# Patient Record
Sex: MALE | Race: BLACK OR AFRICAN AMERICAN | NOT HISPANIC OR LATINO | Employment: OTHER | URBAN - METROPOLITAN AREA
[De-identification: names, ages, dates, MRNs, and addresses within clinical notes are randomized per-mention and may not be internally consistent; named-entity substitution may affect disease eponyms.]

---

## 2018-03-31 ENCOUNTER — HISTORICAL (OUTPATIENT)
Dept: ADMINISTRATIVE | Facility: HOSPITAL | Age: 53
End: 2018-03-31

## 2018-03-31 LAB — GRAM STN SPEC: NORMAL

## 2018-04-02 LAB — FINAL CULTURE: NO GROWTH

## 2018-04-06 LAB — FINAL CULTURE: NORMAL

## 2018-04-11 LAB
FINAL CULTURE: NORMAL
FINAL CULTURE: NORMAL

## 2018-09-17 ENCOUNTER — HISTORICAL (OUTPATIENT)
Dept: ADMINISTRATIVE | Facility: HOSPITAL | Age: 53
End: 2018-09-17

## 2018-09-25 ENCOUNTER — HISTORICAL (OUTPATIENT)
Dept: ADMINISTRATIVE | Facility: HOSPITAL | Age: 53
End: 2018-09-25

## 2018-10-04 ENCOUNTER — HISTORICAL (OUTPATIENT)
Dept: ADMINISTRATIVE | Facility: HOSPITAL | Age: 53
End: 2018-10-04

## 2018-10-16 ENCOUNTER — HISTORICAL (OUTPATIENT)
Dept: ADMINISTRATIVE | Facility: HOSPITAL | Age: 53
End: 2018-10-16

## 2018-10-22 ENCOUNTER — HISTORICAL (OUTPATIENT)
Dept: ADMINISTRATIVE | Facility: HOSPITAL | Age: 53
End: 2018-10-22

## 2018-11-05 ENCOUNTER — HISTORICAL (OUTPATIENT)
Dept: ADMINISTRATIVE | Facility: HOSPITAL | Age: 53
End: 2018-11-05

## 2018-11-12 ENCOUNTER — HISTORICAL (OUTPATIENT)
Dept: ADMINISTRATIVE | Facility: HOSPITAL | Age: 53
End: 2018-11-12

## 2018-11-19 ENCOUNTER — HISTORICAL (OUTPATIENT)
Dept: ADMINISTRATIVE | Facility: HOSPITAL | Age: 53
End: 2018-11-19

## 2018-11-26 ENCOUNTER — HISTORICAL (OUTPATIENT)
Dept: ADMINISTRATIVE | Facility: HOSPITAL | Age: 53
End: 2018-11-26

## 2018-12-03 ENCOUNTER — HISTORICAL (OUTPATIENT)
Dept: ADMINISTRATIVE | Facility: HOSPITAL | Age: 53
End: 2018-12-03

## 2018-12-04 ENCOUNTER — HISTORICAL (OUTPATIENT)
Dept: RADIOLOGY | Facility: HOSPITAL | Age: 53
End: 2018-12-04

## 2018-12-10 ENCOUNTER — HISTORICAL (OUTPATIENT)
Dept: ADMINISTRATIVE | Facility: HOSPITAL | Age: 53
End: 2018-12-10

## 2018-12-17 ENCOUNTER — HISTORICAL (OUTPATIENT)
Dept: ADMINISTRATIVE | Facility: HOSPITAL | Age: 53
End: 2018-12-17

## 2018-12-27 ENCOUNTER — HISTORICAL (OUTPATIENT)
Dept: ADMINISTRATIVE | Facility: HOSPITAL | Age: 53
End: 2018-12-27

## 2019-01-07 ENCOUNTER — HISTORICAL (OUTPATIENT)
Dept: LAB | Facility: HOSPITAL | Age: 54
End: 2019-01-07

## 2019-01-07 ENCOUNTER — HISTORICAL (OUTPATIENT)
Dept: ADMINISTRATIVE | Facility: HOSPITAL | Age: 54
End: 2019-01-07

## 2019-01-11 LAB — FINAL CULTURE: NORMAL

## 2019-01-14 ENCOUNTER — HISTORICAL (OUTPATIENT)
Dept: ADMINISTRATIVE | Facility: HOSPITAL | Age: 54
End: 2019-01-14

## 2019-02-11 ENCOUNTER — HISTORICAL (OUTPATIENT)
Dept: ADMINISTRATIVE | Facility: HOSPITAL | Age: 54
End: 2019-02-11

## 2019-02-18 ENCOUNTER — HISTORICAL (OUTPATIENT)
Dept: ADMINISTRATIVE | Facility: HOSPITAL | Age: 54
End: 2019-02-18

## 2019-02-25 ENCOUNTER — HISTORICAL (OUTPATIENT)
Dept: ADMINISTRATIVE | Facility: HOSPITAL | Age: 54
End: 2019-02-25

## 2019-03-11 ENCOUNTER — HISTORICAL (OUTPATIENT)
Dept: ADMINISTRATIVE | Facility: HOSPITAL | Age: 54
End: 2019-03-11

## 2019-03-18 ENCOUNTER — HISTORICAL (OUTPATIENT)
Dept: ADMINISTRATIVE | Facility: HOSPITAL | Age: 54
End: 2019-03-18

## 2019-03-25 ENCOUNTER — HISTORICAL (OUTPATIENT)
Dept: ADMINISTRATIVE | Facility: HOSPITAL | Age: 54
End: 2019-03-25

## 2019-04-08 ENCOUNTER — HISTORICAL (OUTPATIENT)
Dept: ADMINISTRATIVE | Facility: HOSPITAL | Age: 54
End: 2019-04-08

## 2019-05-19 ENCOUNTER — HISTORICAL (OUTPATIENT)
Dept: ADMINISTRATIVE | Facility: HOSPITAL | Age: 54
End: 2019-05-19

## 2019-08-01 ENCOUNTER — HISTORICAL (OUTPATIENT)
Dept: ADMINISTRATIVE | Facility: HOSPITAL | Age: 54
End: 2019-08-01

## 2019-08-12 ENCOUNTER — HISTORICAL (OUTPATIENT)
Dept: ADMINISTRATIVE | Facility: HOSPITAL | Age: 54
End: 2019-08-12

## 2019-08-19 ENCOUNTER — HISTORICAL (OUTPATIENT)
Dept: ADMINISTRATIVE | Facility: HOSPITAL | Age: 54
End: 2019-08-19

## 2019-08-26 ENCOUNTER — HISTORICAL (OUTPATIENT)
Dept: ADMINISTRATIVE | Facility: HOSPITAL | Age: 54
End: 2019-08-26

## 2019-09-09 ENCOUNTER — HISTORICAL (OUTPATIENT)
Dept: ADMINISTRATIVE | Facility: HOSPITAL | Age: 54
End: 2019-09-09

## 2019-09-16 ENCOUNTER — HISTORICAL (OUTPATIENT)
Dept: ADMINISTRATIVE | Facility: HOSPITAL | Age: 54
End: 2019-09-16

## 2019-09-23 ENCOUNTER — HISTORICAL (OUTPATIENT)
Dept: ADMINISTRATIVE | Facility: HOSPITAL | Age: 54
End: 2019-09-23

## 2019-09-30 ENCOUNTER — HISTORICAL (OUTPATIENT)
Dept: ADMINISTRATIVE | Facility: HOSPITAL | Age: 54
End: 2019-09-30

## 2019-10-07 ENCOUNTER — HISTORICAL (OUTPATIENT)
Dept: ADMINISTRATIVE | Facility: HOSPITAL | Age: 54
End: 2019-10-07

## 2019-10-16 ENCOUNTER — EXTERNAL HOSPITAL ADMISSION (OUTPATIENT)
Dept: ADMINISTRATIVE | Facility: CLINIC | Age: 54
End: 2019-10-16

## 2019-10-16 ENCOUNTER — TELEPHONE (OUTPATIENT)
Dept: ADMINISTRATIVE | Facility: CLINIC | Age: 54
End: 2019-10-16

## 2019-10-16 NOTE — PROGRESS NOTES
C3 nurse attempted to contact patient. No answer. The following message was left for the patient to return the call:  Good morning I am a nurse calling on behalf of [facility] [email or web address] from the [facility].  This is a Transitional Care Call for Bianca Khan. When you have a moment please contact us at [phone] or [phone] Monday through Friday, between the hours of 8 am to 4 pm. We look forward to speaking with you. On behalf of [employer] [facility] have a nice day.    The patient does not have a scheduled HOSFU appointment within 7-14 days post hospital discharge date 10/15/19. Patient has no PCP assigned at this time, unable to route.

## 2019-10-28 ENCOUNTER — HISTORICAL (OUTPATIENT)
Dept: ADMINISTRATIVE | Facility: HOSPITAL | Age: 54
End: 2019-10-28

## 2019-10-30 ENCOUNTER — HISTORICAL (OUTPATIENT)
Dept: ADMINISTRATIVE | Facility: HOSPITAL | Age: 54
End: 2019-10-30

## 2019-11-11 ENCOUNTER — HISTORICAL (OUTPATIENT)
Dept: ADMINISTRATIVE | Facility: HOSPITAL | Age: 54
End: 2019-11-11

## 2019-11-18 ENCOUNTER — HISTORICAL (OUTPATIENT)
Dept: ADMINISTRATIVE | Facility: HOSPITAL | Age: 54
End: 2019-11-18

## 2019-11-18 LAB
ABS NEUT (OLG): 3.98 X10(3)/MCL (ref 2.1–9.2)
BASOPHILS # BLD AUTO: 0 X10(3)/MCL (ref 0–0.2)
BASOPHILS NFR BLD AUTO: 0 %
BUN SERPL-MCNC: 12 MG/DL (ref 7–18)
CALCIUM SERPL-MCNC: 9.6 MG/DL (ref 8.5–10.1)
CHLORIDE SERPL-SCNC: 102 MMOL/L (ref 98–107)
CO2 SERPL-SCNC: 32 MMOL/L (ref 21–32)
CREAT SERPL-MCNC: 0.83 MG/DL (ref 0.7–1.3)
CREAT/UREA NIT SERPL: 14.5
CRP SERPL HS-MCNC: 14.7 MG/L (ref 0–3)
EOSINOPHIL # BLD AUTO: 0.1 X10(3)/MCL (ref 0–0.9)
EOSINOPHIL NFR BLD AUTO: 1 %
ERYTHROCYTE [DISTWIDTH] IN BLOOD BY AUTOMATED COUNT: 15.5 % (ref 11.5–17)
ERYTHROCYTE [SEDIMENTATION RATE] IN BLOOD: 33 MM/HR (ref 0–15)
EST. AVERAGE GLUCOSE BLD GHB EST-MCNC: 134 MG/DL
GLUCOSE SERPL-MCNC: 90 MG/DL (ref 74–106)
HBA1C MFR BLD: 6.3 % (ref 4.2–6.3)
HCT VFR BLD AUTO: 42.3 % (ref 42–52)
HGB BLD-MCNC: 12.7 GM/DL (ref 14–18)
LYMPHOCYTES # BLD AUTO: 3.8 X10(3)/MCL (ref 0.6–4.6)
LYMPHOCYTES NFR BLD AUTO: 44 %
MCH RBC QN AUTO: 26.7 PG (ref 27–31)
MCHC RBC AUTO-ENTMCNC: 30 GM/DL (ref 33–36)
MCV RBC AUTO: 89.1 FL (ref 80–94)
MONOCYTES # BLD AUTO: 0.6 X10(3)/MCL (ref 0.1–1.3)
MONOCYTES NFR BLD AUTO: 7 %
NEUTROPHILS # BLD AUTO: 3.98 X10(3)/MCL (ref 2.1–9.2)
NEUTROPHILS NFR BLD AUTO: 46 %
PLATELET # BLD AUTO: 364 X10(3)/MCL (ref 130–400)
PMV BLD AUTO: 8.7 FL (ref 9.4–12.4)
POTASSIUM SERPL-SCNC: 4.5 MMOL/L (ref 3.5–5.1)
PREALB SERPL-MCNC: 27.9 MG/DL (ref 18–38)
RBC # BLD AUTO: 4.75 X10(6)/MCL (ref 4.7–6.1)
SODIUM SERPL-SCNC: 138 MMOL/L (ref 136–145)
WBC # SPEC AUTO: 8.6 X10(3)/MCL (ref 4.5–11.5)

## 2019-11-25 ENCOUNTER — HISTORICAL (OUTPATIENT)
Dept: ADMINISTRATIVE | Facility: HOSPITAL | Age: 54
End: 2019-11-25

## 2019-12-02 ENCOUNTER — HISTORICAL (OUTPATIENT)
Dept: ADMINISTRATIVE | Facility: HOSPITAL | Age: 54
End: 2019-12-02

## 2019-12-02 ENCOUNTER — HISTORICAL (OUTPATIENT)
Dept: ANESTHESIOLOGY | Facility: HOSPITAL | Age: 54
End: 2019-12-02

## 2019-12-09 ENCOUNTER — HISTORICAL (OUTPATIENT)
Dept: ADMINISTRATIVE | Facility: HOSPITAL | Age: 54
End: 2019-12-09

## 2019-12-23 ENCOUNTER — HISTORICAL (OUTPATIENT)
Dept: ADMINISTRATIVE | Facility: HOSPITAL | Age: 54
End: 2019-12-23

## 2019-12-30 ENCOUNTER — HISTORICAL (OUTPATIENT)
Dept: ADMINISTRATIVE | Facility: HOSPITAL | Age: 54
End: 2019-12-30

## 2020-01-06 ENCOUNTER — HISTORICAL (OUTPATIENT)
Dept: ADMINISTRATIVE | Facility: HOSPITAL | Age: 55
End: 2020-01-06

## 2020-01-13 ENCOUNTER — HISTORICAL (OUTPATIENT)
Dept: ADMINISTRATIVE | Facility: HOSPITAL | Age: 55
End: 2020-01-13

## 2020-01-20 ENCOUNTER — HISTORICAL (OUTPATIENT)
Dept: ADMINISTRATIVE | Facility: HOSPITAL | Age: 55
End: 2020-01-20

## 2020-01-27 ENCOUNTER — HISTORICAL (OUTPATIENT)
Dept: ADMINISTRATIVE | Facility: HOSPITAL | Age: 55
End: 2020-01-27

## 2020-02-10 ENCOUNTER — HISTORICAL (OUTPATIENT)
Dept: ADMINISTRATIVE | Facility: HOSPITAL | Age: 55
End: 2020-02-10

## 2020-02-12 ENCOUNTER — HISTORICAL (OUTPATIENT)
Dept: CARDIOLOGY | Facility: HOSPITAL | Age: 55
End: 2020-02-12

## 2020-02-12 LAB
ABS NEUT (OLG): 2.02 X10(3)/MCL (ref 2.1–9.2)
APPEARANCE, UA: ABNORMAL
BACTERIA SPEC CULT: ABNORMAL /HPF
BASOPHILS NFR BLD MANUAL: 2 % (ref 0–2)
BILIRUB UR QL STRIP: NEGATIVE
BUN SERPL-MCNC: 21 MG/DL (ref 7–18)
CALCIUM SERPL-MCNC: 9.2 MG/DL (ref 8.5–10.1)
CHLORIDE SERPL-SCNC: 104 MMOL/L (ref 98–107)
CO2 SERPL-SCNC: 28 MMOL/L (ref 21–32)
COLOR UR: YELLOW
CREAT SERPL-MCNC: 0.97 MG/DL (ref 0.7–1.3)
CREAT/UREA NIT SERPL: 21.6
EOSINOPHIL NFR BLD MANUAL: 1 % (ref 0–8)
ERYTHROCYTE [DISTWIDTH] IN BLOOD BY AUTOMATED COUNT: 16.3 % (ref 11.5–17)
GLUCOSE (UA): NEGATIVE
GLUCOSE SERPL-MCNC: 147 MG/DL (ref 74–106)
HCT VFR BLD AUTO: 38.2 % (ref 42–52)
HGB BLD-MCNC: 11.8 GM/DL (ref 14–18)
HGB UR QL STRIP: ABNORMAL
INR PPP: 1 (ref 0–1.3)
KETONES UR QL STRIP: NEGATIVE
LEUKOCYTE ESTERASE UR QL STRIP: ABNORMAL
LYMPHOCYTES NFR BLD MANUAL: 47 % (ref 13–40)
MCH RBC QN AUTO: 27.3 PG (ref 27–31)
MCHC RBC AUTO-ENTMCNC: 30.9 GM/DL (ref 33–36)
MCV RBC AUTO: 88.2 FL (ref 80–94)
MONOCYTES NFR BLD MANUAL: 5 % (ref 2–11)
NEUTROPHILS NFR BLD MANUAL: 46 % (ref 47–80)
NITRITE UR QL STRIP: POSITIVE
PH UR STRIP: 5 [PH] (ref 5–9)
PLATELET # BLD AUTO: 406 X10(3)/MCL (ref 130–400)
PLATELET # BLD EST: ABNORMAL 10*3/UL
PMV BLD AUTO: 9.4 FL (ref 7.4–10.4)
POLYCHROMASIA BLD QL SMEAR: 1
POTASSIUM SERPL-SCNC: 3.7 MMOL/L (ref 3.5–5.1)
PROT UR QL STRIP: ABNORMAL
PROTHROMBIN TIME: 13 SECOND(S) (ref 12–14)
RBC # BLD AUTO: 4.33 X10(6)/MCL (ref 4.7–6.1)
RBC #/AREA URNS HPF: 42 /HPF (ref 0–2)
SODIUM SERPL-SCNC: 139 MMOL/L (ref 136–145)
SP GR UR STRIP: 1.02 (ref 1–1.03)
SQUAMOUS EPITHELIAL, UA: ABNORMAL
UROBILINOGEN UR STRIP-ACNC: 1
WBC # SPEC AUTO: 6.5 X10(3)/MCL (ref 4.5–11.5)
WBC #/AREA URNS HPF: 336 /HPF (ref 0–3)

## 2020-02-17 ENCOUNTER — HISTORICAL (OUTPATIENT)
Dept: ADMINISTRATIVE | Facility: HOSPITAL | Age: 55
End: 2020-02-17

## 2020-02-24 ENCOUNTER — HISTORICAL (OUTPATIENT)
Dept: ADMINISTRATIVE | Facility: HOSPITAL | Age: 55
End: 2020-02-24

## 2020-03-02 ENCOUNTER — HISTORICAL (OUTPATIENT)
Dept: ADMINISTRATIVE | Facility: HOSPITAL | Age: 55
End: 2020-03-02

## 2020-03-02 LAB
ABS NEUT (OLG): 2.82 X10(3)/MCL (ref 2.1–9.2)
ALBUMIN SERPL-MCNC: 3.2 GM/DL (ref 3.4–5)
ALBUMIN/GLOB SERPL: 0.7 RATIO (ref 1.1–2)
ALP SERPL-CCNC: 89 UNIT/L (ref 50–136)
ALT SERPL-CCNC: 31 UNIT/L (ref 12–78)
AST SERPL-CCNC: 27 UNIT/L (ref 15–37)
BASOPHILS # BLD AUTO: 0 X10(3)/MCL (ref 0–0.2)
BASOPHILS NFR BLD AUTO: 0 %
BILIRUB SERPL-MCNC: 0.2 MG/DL (ref 0.2–1)
BILIRUBIN DIRECT+TOT PNL SERPL-MCNC: 0.1 MG/DL (ref 0–0.5)
BILIRUBIN DIRECT+TOT PNL SERPL-MCNC: 0.1 MG/DL (ref 0–0.8)
BUN SERPL-MCNC: 11 MG/DL (ref 7–18)
CALCIUM SERPL-MCNC: 9 MG/DL (ref 8.5–10.1)
CHLORIDE SERPL-SCNC: 101 MMOL/L (ref 98–107)
CO2 SERPL-SCNC: 31 MMOL/L (ref 21–32)
CREAT SERPL-MCNC: 1.01 MG/DL (ref 0.7–1.3)
CRP SERPL HS-MCNC: 5.99 MG/L (ref 0–3)
EOSINOPHIL # BLD AUTO: 0.1 X10(3)/MCL (ref 0–0.9)
EOSINOPHIL NFR BLD AUTO: 1 %
ERYTHROCYTE [DISTWIDTH] IN BLOOD BY AUTOMATED COUNT: 16.2 % (ref 11.5–17)
ERYTHROCYTE [SEDIMENTATION RATE] IN BLOOD: 72 MM/HR (ref 0–15)
EST. AVERAGE GLUCOSE BLD GHB EST-MCNC: 192 MG/DL
GLOBULIN SER-MCNC: 4.5 GM/DL (ref 2.4–3.5)
GLUCOSE SERPL-MCNC: 125 MG/DL (ref 74–106)
HBA1C MFR BLD: 8.3 % (ref 4.2–6.3)
HCT VFR BLD AUTO: 40.8 % (ref 42–52)
HGB BLD-MCNC: 12.3 GM/DL (ref 14–18)
LYMPHOCYTES # BLD AUTO: 3.1 X10(3)/MCL (ref 0.6–4.6)
LYMPHOCYTES NFR BLD AUTO: 48 %
MCH RBC QN AUTO: 27.1 PG (ref 27–31)
MCHC RBC AUTO-ENTMCNC: 30.1 GM/DL (ref 33–36)
MCV RBC AUTO: 89.9 FL (ref 80–94)
MONOCYTES # BLD AUTO: 0.5 X10(3)/MCL (ref 0.1–1.3)
MONOCYTES NFR BLD AUTO: 7 %
NEUTROPHILS # BLD AUTO: 2.82 X10(3)/MCL (ref 2.1–9.2)
NEUTROPHILS NFR BLD AUTO: 43 %
PLATELET # BLD AUTO: 393 X10(3)/MCL (ref 130–400)
PMV BLD AUTO: 9.7 FL (ref 9.4–12.4)
POTASSIUM SERPL-SCNC: 4.3 MMOL/L (ref 3.5–5.1)
PREALB SERPL-MCNC: 26.2 MG/DL (ref 18–38)
PROT SERPL-MCNC: 7.7 GM/DL (ref 6.4–8.2)
RBC # BLD AUTO: 4.54 X10(6)/MCL (ref 4.7–6.1)
SODIUM SERPL-SCNC: 138 MMOL/L (ref 136–145)
WBC # SPEC AUTO: 6.5 X10(3)/MCL (ref 4.5–11.5)

## 2020-03-09 ENCOUNTER — HISTORICAL (OUTPATIENT)
Dept: ADMINISTRATIVE | Facility: HOSPITAL | Age: 55
End: 2020-03-09

## 2020-03-11 LAB
APPEARANCE, UA: ABNORMAL
BACTERIA SPEC CULT: ABNORMAL /HPF
BILIRUB UR QL STRIP: NEGATIVE
COLOR UR: YELLOW
GLUCOSE (UA): ABNORMAL
HGB UR QL STRIP: ABNORMAL
KETONES UR QL STRIP: ABNORMAL
LEUKOCYTE ESTERASE UR QL STRIP: ABNORMAL
MUCOUS THREADS URNS QL MICRO: ABNORMAL /LPF
NITRITE UR QL STRIP: POSITIVE
PH UR STRIP: 5.5 [PH] (ref 5–7)
PROT UR QL STRIP: ABNORMAL
RBC #/AREA URNS HPF: ABNORMAL /HPF
SP GR UR STRIP: >=1.03 (ref 1–1.03)
SQUAMOUS EPITHELIAL, UA: ABNORMAL /LPF
UROBILINOGEN UR STRIP-ACNC: NEGATIVE
WBC #/AREA URNS HPF: ABNORMAL /HPF

## 2020-03-12 LAB
ABS NEUT (OLG): 2.7 X10(3)/MCL (ref 2.1–9.2)
ALBUMIN SERPL-MCNC: 3.1 GM/DL (ref 3.4–5)
ALBUMIN/GLOB SERPL: 0.6 {RATIO}
ALP SERPL-CCNC: 96 UNIT/L (ref 45–117)
ALT SERPL-CCNC: 32 UNIT/L (ref 16–61)
AST SERPL-CCNC: 20 UNIT/L (ref 15–37)
BASOPHILS # BLD AUTO: 0.03 X10(3)/MCL (ref 0–0.2)
BASOPHILS NFR BLD AUTO: 0.4 % (ref 0–0.9)
BILIRUB SERPL-MCNC: 0.1 MG/DL (ref 0.2–1)
BILIRUBIN DIRECT+TOT PNL SERPL-MCNC: <0.1 MG/DL (ref 0–0.2)
BILIRUBIN DIRECT+TOT PNL SERPL-MCNC: >0 MG/DL (ref 0–1)
BUN SERPL-MCNC: 14 MG/DL (ref 7–18)
CALCIUM SERPL-MCNC: 9.1 MG/DL (ref 8.5–10.1)
CHLORIDE SERPL-SCNC: 102 MMOL/L (ref 98–107)
CHOLEST SERPL-MCNC: 204 MG/DL (ref 0–199)
CHOLEST/HDLC SERPL: 9 {RATIO}
CO2 SERPL-SCNC: 30 MMOL/L (ref 21–32)
CREAT SERPL-MCNC: 1.11 MG/DL (ref 0.7–1.3)
CRP SERPL HS-MCNC: 16.5 MG/L
EOSINOPHIL # BLD AUTO: 0.22 X10(3)/MCL (ref 0–0.9)
EOSINOPHIL NFR BLD AUTO: 3 % (ref 0–6.5)
ERYTHROCYTE [DISTWIDTH] IN BLOOD BY AUTOMATED COUNT: 15 % (ref 11.5–17)
ERYTHROCYTE [SEDIMENTATION RATE] IN BLOOD: 41 MM/HR (ref 0–20)
EST. AVERAGE GLUCOSE BLD GHB EST-MCNC: 206 MG/DL
GLOBULIN SER-MCNC: 5 GM/DL (ref 2–4)
GLUCOSE SERPL-MCNC: 184 MG/DL (ref 74–106)
HBA1C MFR BLD: 8.8 % (ref 4.2–6.3)
HCT VFR BLD AUTO: 37.5 % (ref 42–52)
HDLC SERPL-MCNC: 22 MG/DL
HGB BLD-MCNC: 11.9 GM/DL (ref 14–18)
IMM GRANULOCYTES # BLD AUTO: 0.03 10*3/UL (ref 0–0.02)
IMM GRANULOCYTES NFR BLD AUTO: 0.4 % (ref 0–0.43)
LDLC SERPL CALC-MCNC: 111 MG/DL (ref 0–129)
LYMPHOCYTES # BLD AUTO: 3.81 X10(3)/MCL (ref 0.6–4.6)
LYMPHOCYTES NFR BLD AUTO: 52 % (ref 16.2–38.3)
MCH RBC QN AUTO: 27.3 PG (ref 27–31)
MCHC RBC AUTO-ENTMCNC: 31.7 GM/DL (ref 33–36)
MCV RBC AUTO: 86 FL (ref 80–94)
MONOCYTES # BLD AUTO: 0.53 X10(3)/MCL (ref 0.1–1.3)
MONOCYTES NFR BLD AUTO: 7.2 % (ref 4.7–11.3)
NEUTROPHILS # BLD AUTO: 2.7 X10(3)/MCL (ref 2.1–9.2)
NEUTROPHILS NFR BLD AUTO: 37 % (ref 49.1–73.4)
NRBC BLD AUTO-RTO: 0 % (ref 0–0.2)
PLATELET # BLD AUTO: 440 X10(3)/MCL (ref 130–400)
PMV BLD AUTO: 9.4 FL (ref 7.4–10.4)
POTASSIUM SERPL-SCNC: 3.5 MMOL/L (ref 3.5–5.1)
PREALB SERPL-MCNC: 23.3 MG/DL (ref 20–40)
PROT SERPL-MCNC: 7.8 GM/DL (ref 6.4–8.2)
RBC # BLD AUTO: 4.36 X10(6)/MCL (ref 4.7–6.1)
SODIUM SERPL-SCNC: 137 MMOL/L (ref 136–145)
TRIGL SERPL-MCNC: 355 MG/DL (ref 0–149)
VLDLC SERPL CALC-MCNC: 71 MG/DL
WBC # SPEC AUTO: 7.3 X10(3)/MCL (ref 4.5–11.5)

## 2020-03-16 LAB
ABS NEUT (OLG): 2.99 X10(3)/MCL (ref 2.1–9.2)
ALBUMIN SERPL-MCNC: 3.2 GM/DL (ref 3.4–5)
ALBUMIN/GLOB SERPL: 0.6 {RATIO}
ALP SERPL-CCNC: 79 UNIT/L (ref 45–117)
ALT SERPL-CCNC: 43 UNIT/L (ref 16–61)
AST SERPL-CCNC: 32 UNIT/L (ref 15–37)
BASOPHILS # BLD AUTO: 0.02 X10(3)/MCL (ref 0–0.2)
BASOPHILS NFR BLD AUTO: 0.3 % (ref 0–0.9)
BILIRUB SERPL-MCNC: 0.2 MG/DL (ref 0.2–1)
BILIRUBIN DIRECT+TOT PNL SERPL-MCNC: <0.1 MG/DL (ref 0–0.2)
BILIRUBIN DIRECT+TOT PNL SERPL-MCNC: >0.1 MG/DL (ref 0–1)
BUN SERPL-MCNC: 12 MG/DL (ref 7–18)
CALCIUM SERPL-MCNC: 9.3 MG/DL (ref 8.5–10.1)
CHLORIDE SERPL-SCNC: 107 MMOL/L (ref 98–107)
CO2 SERPL-SCNC: 30 MMOL/L (ref 21–32)
CREAT SERPL-MCNC: 0.8 MG/DL (ref 0.7–1.3)
EOSINOPHIL # BLD AUTO: 0.15 X10(3)/MCL (ref 0–0.9)
EOSINOPHIL NFR BLD AUTO: 2.3 % (ref 0–6.5)
ERYTHROCYTE [DISTWIDTH] IN BLOOD BY AUTOMATED COUNT: 14.8 % (ref 11.5–17)
GLOBULIN SER-MCNC: 5 GM/DL (ref 2–4)
GLUCOSE SERPL-MCNC: 118 MG/DL (ref 74–106)
HCT VFR BLD AUTO: 38.5 % (ref 42–52)
HGB BLD-MCNC: 12.3 GM/DL (ref 14–18)
IMM GRANULOCYTES # BLD AUTO: 0.02 10*3/UL (ref 0–0.02)
IMM GRANULOCYTES NFR BLD AUTO: 0.3 % (ref 0–0.43)
LYMPHOCYTES # BLD AUTO: 2.9 X10(3)/MCL (ref 0.6–4.6)
LYMPHOCYTES NFR BLD AUTO: 43.6 % (ref 16.2–38.3)
MCH RBC QN AUTO: 28 PG (ref 27–31)
MCHC RBC AUTO-ENTMCNC: 31.9 GM/DL (ref 33–36)
MCV RBC AUTO: 87.7 FL (ref 80–94)
MONOCYTES # BLD AUTO: 0.57 X10(3)/MCL (ref 0.1–1.3)
MONOCYTES NFR BLD AUTO: 8.6 % (ref 4.7–11.3)
NEUTROPHILS # BLD AUTO: 2.99 X10(3)/MCL (ref 2.1–9.2)
NEUTROPHILS NFR BLD AUTO: 44.9 % (ref 49.1–73.4)
NRBC BLD AUTO-RTO: 0 % (ref 0–0.2)
PLATELET # BLD AUTO: 392 X10(3)/MCL (ref 130–400)
PMV BLD AUTO: 9.4 FL (ref 7.4–10.4)
POTASSIUM SERPL-SCNC: 3.8 MMOL/L (ref 3.5–5.1)
PREALB SERPL-MCNC: 19.5 MG/DL (ref 20–40)
PROT SERPL-MCNC: 7.9 GM/DL (ref 6.4–8.2)
RBC # BLD AUTO: 4.39 X10(6)/MCL (ref 4.7–6.1)
SODIUM SERPL-SCNC: 142 MMOL/L (ref 136–145)
WBC # SPEC AUTO: 6.6 X10(3)/MCL (ref 4.5–11.5)

## 2020-03-23 LAB
ABS NEUT (OLG): 4.17 X10(3)/MCL (ref 2.1–9.2)
ALBUMIN SERPL-MCNC: 3.1 GM/DL (ref 3.4–5)
ALBUMIN/GLOB SERPL: 0.6 {RATIO}
ALP SERPL-CCNC: 84 UNIT/L (ref 45–117)
ALT SERPL-CCNC: 37 UNIT/L (ref 16–61)
AST SERPL-CCNC: 22 UNIT/L (ref 15–37)
BASOPHILS # BLD AUTO: 0.02 X10(3)/MCL (ref 0–0.2)
BASOPHILS NFR BLD AUTO: 0.2 % (ref 0–0.9)
BILIRUB SERPL-MCNC: 0.2 MG/DL (ref 0.2–1)
BILIRUBIN DIRECT+TOT PNL SERPL-MCNC: <0.1 MG/DL (ref 0–0.2)
BILIRUBIN DIRECT+TOT PNL SERPL-MCNC: >0.1 MG/DL (ref 0–1)
BUN SERPL-MCNC: 9 MG/DL (ref 7–18)
CALCIUM SERPL-MCNC: 9.4 MG/DL (ref 8.5–10.1)
CHLORIDE SERPL-SCNC: 104 MMOL/L (ref 98–107)
CO2 SERPL-SCNC: 30 MMOL/L (ref 21–32)
CREAT SERPL-MCNC: 0.89 MG/DL (ref 0.7–1.3)
CRP SERPL HS-MCNC: 29 MG/L
EOSINOPHIL # BLD AUTO: 0.17 X10(3)/MCL (ref 0–0.9)
EOSINOPHIL NFR BLD AUTO: 2 % (ref 0–6.5)
ERYTHROCYTE [DISTWIDTH] IN BLOOD BY AUTOMATED COUNT: 14.6 % (ref 11.5–17)
ERYTHROCYTE [SEDIMENTATION RATE] IN BLOOD: 62 MM/HR (ref 0–20)
GLOBULIN SER-MCNC: 5 GM/DL (ref 2–4)
GLUCOSE SERPL-MCNC: 124 MG/DL (ref 74–106)
HCT VFR BLD AUTO: 37.4 % (ref 42–52)
HGB BLD-MCNC: 11.7 GM/DL (ref 14–18)
IMM GRANULOCYTES # BLD AUTO: 0.03 10*3/UL (ref 0–0.02)
IMM GRANULOCYTES NFR BLD AUTO: 0.4 % (ref 0–0.43)
LYMPHOCYTES # BLD AUTO: 3.43 X10(3)/MCL (ref 0.6–4.6)
LYMPHOCYTES NFR BLD AUTO: 40.7 % (ref 16.2–38.3)
MCH RBC QN AUTO: 27.6 PG (ref 27–31)
MCHC RBC AUTO-ENTMCNC: 31.3 GM/DL (ref 33–36)
MCV RBC AUTO: 88.2 FL (ref 80–94)
MONOCYTES # BLD AUTO: 0.6 X10(3)/MCL (ref 0.1–1.3)
MONOCYTES NFR BLD AUTO: 7.1 % (ref 4.7–11.3)
NEUTROPHILS # BLD AUTO: 4.17 X10(3)/MCL (ref 2.1–9.2)
NEUTROPHILS NFR BLD AUTO: 49.6 % (ref 49.1–73.4)
NRBC BLD AUTO-RTO: 0 % (ref 0–0.2)
PLATELET # BLD AUTO: 378 X10(3)/MCL (ref 130–400)
PMV BLD AUTO: 9.5 FL (ref 7.4–10.4)
POTASSIUM SERPL-SCNC: 4.1 MMOL/L (ref 3.5–5.1)
PREALB SERPL-MCNC: 22.6 MG/DL (ref 20–40)
PROT SERPL-MCNC: 7.8 GM/DL (ref 6.4–8.2)
RBC # BLD AUTO: 4.24 X10(6)/MCL (ref 4.7–6.1)
SODIUM SERPL-SCNC: 140 MMOL/L (ref 136–145)
WBC # SPEC AUTO: 8.4 X10(3)/MCL (ref 4.5–11.5)

## 2020-03-30 ENCOUNTER — HISTORICAL (OUTPATIENT)
Dept: ADMINISTRATIVE | Facility: HOSPITAL | Age: 55
End: 2020-03-30

## 2020-03-30 LAB
ABS NEUT (OLG): 3.79 X10(3)/MCL (ref 2.1–9.2)
ALBUMIN SERPL-MCNC: 3.1 GM/DL (ref 3.4–5)
ALBUMIN/GLOB SERPL: 0.6 {RATIO}
ALP SERPL-CCNC: 81 UNIT/L (ref 45–117)
ALT SERPL-CCNC: 37 UNIT/L (ref 16–61)
AST SERPL-CCNC: 23 UNIT/L (ref 15–37)
BASOPHILS # BLD AUTO: 0.02 X10(3)/MCL (ref 0–0.2)
BASOPHILS NFR BLD AUTO: 0.2 % (ref 0–0.9)
BILIRUB SERPL-MCNC: 0.2 MG/DL (ref 0.2–1)
BILIRUBIN DIRECT+TOT PNL SERPL-MCNC: <0.1 MG/DL (ref 0–0.2)
BILIRUBIN DIRECT+TOT PNL SERPL-MCNC: >0.1 MG/DL (ref 0–1)
BUN SERPL-MCNC: 13 MG/DL (ref 7–18)
CALCIUM SERPL-MCNC: 9 MG/DL (ref 8.5–10.1)
CHLORIDE SERPL-SCNC: 104 MMOL/L (ref 98–107)
CO2 SERPL-SCNC: 29 MMOL/L (ref 21–32)
CREAT SERPL-MCNC: 0.86 MG/DL (ref 0.7–1.3)
CRP SERPL HS-MCNC: 60.8 MG/L
EOSINOPHIL # BLD AUTO: 0.16 X10(3)/MCL (ref 0–0.9)
EOSINOPHIL NFR BLD AUTO: 1.9 % (ref 0–6.5)
ERYTHROCYTE [DISTWIDTH] IN BLOOD BY AUTOMATED COUNT: 14.8 % (ref 11.5–17)
ERYTHROCYTE [SEDIMENTATION RATE] IN BLOOD: 58 MM/HR (ref 0–20)
GLOBULIN SER-MCNC: 5 GM/DL (ref 2–4)
GLUCOSE SERPL-MCNC: 150 MG/DL (ref 74–106)
HCT VFR BLD AUTO: 35.8 % (ref 42–52)
HGB BLD-MCNC: 11.2 GM/DL (ref 14–18)
IMM GRANULOCYTES # BLD AUTO: 0.03 10*3/UL (ref 0–0.02)
IMM GRANULOCYTES NFR BLD AUTO: 0.4 % (ref 0–0.43)
LYMPHOCYTES # BLD AUTO: 3.76 X10(3)/MCL (ref 0.6–4.6)
LYMPHOCYTES NFR BLD AUTO: 44.5 % (ref 16.2–38.3)
MCH RBC QN AUTO: 26.8 PG (ref 27–31)
MCHC RBC AUTO-ENTMCNC: 31.3 GM/DL (ref 33–36)
MCV RBC AUTO: 85.6 FL (ref 80–94)
MONOCYTES # BLD AUTO: 0.69 X10(3)/MCL (ref 0.1–1.3)
MONOCYTES NFR BLD AUTO: 8.2 % (ref 4.7–11.3)
NEUTROPHILS # BLD AUTO: 3.79 X10(3)/MCL (ref 2.1–9.2)
NEUTROPHILS NFR BLD AUTO: 44.8 % (ref 49.1–73.4)
NRBC BLD AUTO-RTO: 0 % (ref 0–0.2)
PLATELET # BLD AUTO: 379 X10(3)/MCL (ref 130–400)
PMV BLD AUTO: 9.6 FL (ref 7.4–10.4)
POTASSIUM SERPL-SCNC: 4 MMOL/L (ref 3.5–5.1)
PREALB SERPL-MCNC: 16.5 MG/DL (ref 20–40)
PROT SERPL-MCNC: 7.8 GM/DL (ref 6.4–8.2)
RBC # BLD AUTO: 4.18 X10(6)/MCL (ref 4.7–6.1)
SODIUM SERPL-SCNC: 139 MMOL/L (ref 136–145)
WBC # SPEC AUTO: 8.4 X10(3)/MCL (ref 4.5–11.5)

## 2020-04-02 LAB — FINAL CULTURE: NORMAL

## 2020-04-06 LAB
ABS NEUT (OLG): 2.95 X10(3)/MCL (ref 2.1–9.2)
ALBUMIN SERPL-MCNC: 3.2 GM/DL (ref 3.4–5)
ALBUMIN/GLOB SERPL: 0.8 {RATIO}
ALP SERPL-CCNC: 79 UNIT/L (ref 45–117)
ALT SERPL-CCNC: 50 UNIT/L (ref 16–61)
AST SERPL-CCNC: 29 UNIT/L (ref 15–37)
BASOPHILS # BLD AUTO: 0.02 X10(3)/MCL (ref 0–0.2)
BASOPHILS NFR BLD AUTO: 0.3 % (ref 0–0.9)
BILIRUB SERPL-MCNC: 0.3 MG/DL (ref 0.2–1)
BILIRUBIN DIRECT+TOT PNL SERPL-MCNC: <0.1 MG/DL (ref 0–0.2)
BILIRUBIN DIRECT+TOT PNL SERPL-MCNC: >0.2 MG/DL (ref 0–1)
BUN SERPL-MCNC: 7 MG/DL (ref 7–18)
CALCIUM SERPL-MCNC: 9.5 MG/DL (ref 8.5–10.1)
CHLORIDE SERPL-SCNC: 103 MMOL/L (ref 98–107)
CO2 SERPL-SCNC: 30 MMOL/L (ref 21–32)
CREAT SERPL-MCNC: 0.77 MG/DL (ref 0.7–1.3)
CRP SERPL HS-MCNC: 27.8 MG/L
EOSINOPHIL # BLD AUTO: 0.16 X10(3)/MCL (ref 0–0.9)
EOSINOPHIL NFR BLD AUTO: 2.2 % (ref 0–6.5)
ERYTHROCYTE [DISTWIDTH] IN BLOOD BY AUTOMATED COUNT: 14.6 % (ref 11.5–17)
ERYTHROCYTE [SEDIMENTATION RATE] IN BLOOD: 48 MM/HR (ref 0–20)
GLOBULIN SER-MCNC: 4 GM/DL (ref 2–4)
GLUCOSE SERPL-MCNC: 143 MG/DL (ref 74–106)
HCT VFR BLD AUTO: 35.2 % (ref 42–52)
HGB BLD-MCNC: 11.1 GM/DL (ref 14–18)
IMM GRANULOCYTES # BLD AUTO: 0.02 10*3/UL (ref 0–0.02)
IMM GRANULOCYTES NFR BLD AUTO: 0.3 % (ref 0–0.43)
LYMPHOCYTES # BLD AUTO: 3.47 X10(3)/MCL (ref 0.6–4.6)
LYMPHOCYTES NFR BLD AUTO: 48.2 % (ref 16.2–38.3)
MCH RBC QN AUTO: 26.9 PG (ref 27–31)
MCHC RBC AUTO-ENTMCNC: 31.5 GM/DL (ref 33–36)
MCV RBC AUTO: 85.4 FL (ref 80–94)
MONOCYTES # BLD AUTO: 0.58 X10(3)/MCL (ref 0.1–1.3)
MONOCYTES NFR BLD AUTO: 8.1 % (ref 4.7–11.3)
NEUTROPHILS # BLD AUTO: 2.95 X10(3)/MCL (ref 2.1–9.2)
NEUTROPHILS NFR BLD AUTO: 40.9 % (ref 49.1–73.4)
NRBC BLD AUTO-RTO: 0 % (ref 0–0.2)
PLATELET # BLD AUTO: 352 X10(3)/MCL (ref 130–400)
PMV BLD AUTO: 9.5 FL (ref 7.4–10.4)
POTASSIUM SERPL-SCNC: 3.7 MMOL/L (ref 3.5–5.1)
PREALB SERPL-MCNC: 18.8 MG/DL (ref 20–40)
PROT SERPL-MCNC: 7.7 GM/DL (ref 6.4–8.2)
RBC # BLD AUTO: 4.12 X10(6)/MCL (ref 4.7–6.1)
SODIUM SERPL-SCNC: 141 MMOL/L (ref 136–145)
VANCOMYCIN TROUGH SERPL-MCNC: 24.9 MCG/ML (ref 12–20)
WBC # SPEC AUTO: 7.2 X10(3)/MCL (ref 4.5–11.5)

## 2020-04-07 LAB — VANCOMYCIN TROUGH SERPL-MCNC: 7.9 MCG/ML (ref 12–20)

## 2020-04-11 LAB — VANCOMYCIN TROUGH SERPL-MCNC: 13.9 MCG/ML (ref 12–20)

## 2020-04-12 LAB — VANCOMYCIN TROUGH SERPL-MCNC: 14.9 MCG/ML (ref 12–20)

## 2020-04-13 LAB
ABS NEUT (OLG): 2.88 X10(3)/MCL (ref 2.1–9.2)
ALBUMIN SERPL-MCNC: 3.3 GM/DL (ref 3.4–5)
ALBUMIN/GLOB SERPL: 0.8 {RATIO}
ALP SERPL-CCNC: 73 UNIT/L (ref 45–117)
ALT SERPL-CCNC: 48 UNIT/L (ref 16–61)
AST SERPL-CCNC: 31 UNIT/L (ref 15–37)
BASOPHILS # BLD AUTO: 0.03 X10(3)/MCL (ref 0–0.2)
BASOPHILS NFR BLD AUTO: 0.4 % (ref 0–0.9)
BILIRUB SERPL-MCNC: 0.3 MG/DL (ref 0.2–1)
BILIRUBIN DIRECT+TOT PNL SERPL-MCNC: <0.1 MG/DL (ref 0–0.2)
BILIRUBIN DIRECT+TOT PNL SERPL-MCNC: >0.2 MG/DL (ref 0–1)
BUN SERPL-MCNC: 8 MG/DL (ref 7–18)
CALCIUM SERPL-MCNC: 8.7 MG/DL (ref 8.5–10.1)
CHLORIDE SERPL-SCNC: 108 MMOL/L (ref 98–107)
CO2 SERPL-SCNC: 30 MMOL/L (ref 21–32)
CREAT SERPL-MCNC: 0.69 MG/DL (ref 0.7–1.3)
CRP SERPL HS-MCNC: 7.68 MG/L
EOSINOPHIL # BLD AUTO: 0.15 X10(3)/MCL (ref 0–0.9)
EOSINOPHIL NFR BLD AUTO: 2.1 % (ref 0–6.5)
ERYTHROCYTE [DISTWIDTH] IN BLOOD BY AUTOMATED COUNT: 14.6 % (ref 11.5–17)
ERYTHROCYTE [SEDIMENTATION RATE] IN BLOOD: 35 MM/HR (ref 0–20)
GLOBULIN SER-MCNC: 4 GM/DL (ref 2–4)
GLUCOSE SERPL-MCNC: 106 MG/DL (ref 74–106)
HCT VFR BLD AUTO: 34 % (ref 42–52)
HGB BLD-MCNC: 10.9 GM/DL (ref 14–18)
IMM GRANULOCYTES # BLD AUTO: 0.03 10*3/UL (ref 0–0.02)
IMM GRANULOCYTES NFR BLD AUTO: 0.4 % (ref 0–0.43)
LYMPHOCYTES # BLD AUTO: 3.54 X10(3)/MCL (ref 0.6–4.6)
LYMPHOCYTES NFR BLD AUTO: 49 % (ref 16.2–38.3)
MCH RBC QN AUTO: 27.7 PG (ref 27–31)
MCHC RBC AUTO-ENTMCNC: 32.1 GM/DL (ref 33–36)
MCV RBC AUTO: 86.5 FL (ref 80–94)
MONOCYTES # BLD AUTO: 0.59 X10(3)/MCL (ref 0.1–1.3)
MONOCYTES NFR BLD AUTO: 8.2 % (ref 4.7–11.3)
NEUTROPHILS # BLD AUTO: 2.88 X10(3)/MCL (ref 2.1–9.2)
NEUTROPHILS NFR BLD AUTO: 39.9 % (ref 49.1–73.4)
NRBC BLD AUTO-RTO: 0 % (ref 0–0.2)
PLATELET # BLD AUTO: 328 X10(3)/MCL (ref 130–400)
PMV BLD AUTO: 9.7 FL (ref 7.4–10.4)
POTASSIUM SERPL-SCNC: 3.6 MMOL/L (ref 3.5–5.1)
PREALB SERPL-MCNC: 18.8 MG/DL (ref 20–40)
PROT SERPL-MCNC: 7.4 GM/DL (ref 6.4–8.2)
RBC # BLD AUTO: 3.93 X10(6)/MCL (ref 4.7–6.1)
SODIUM SERPL-SCNC: 142 MMOL/L (ref 136–145)
WBC # SPEC AUTO: 7.2 X10(3)/MCL (ref 4.5–11.5)

## 2020-04-17 LAB — VANCOMYCIN TROUGH SERPL-MCNC: 16.4 UG/ML (ref 15–20)

## 2020-04-20 LAB
ABS NEUT (OLG): 2.77 X10(3)/MCL (ref 2.1–9.2)
ALBUMIN SERPL-MCNC: 3.5 GM/DL (ref 3.5–5)
ALBUMIN/GLOB SERPL: 0.9 RATIO (ref 1.1–2)
ALP SERPL-CCNC: 74 UNIT/L (ref 40–150)
ALT SERPL-CCNC: 41 UNIT/L (ref 0–55)
AST SERPL-CCNC: 27 UNIT/L (ref 5–34)
BASOPHILS # BLD AUTO: 0.03 X10(3)/MCL (ref 0–0.2)
BASOPHILS NFR BLD AUTO: 0.4 % (ref 0–0.9)
BILIRUB SERPL-MCNC: 0.3 MG/DL (ref 0.2–1.2)
BILIRUBIN DIRECT+TOT PNL SERPL-MCNC: 0.1 MG/DL (ref 0–0.5)
BILIRUBIN DIRECT+TOT PNL SERPL-MCNC: 0.2 MG/DL (ref 0–0.8)
BUN SERPL-MCNC: 9.1 MG/DL (ref 8.4–25.7)
CALCIUM SERPL-MCNC: 9.5 MG/DL (ref 8.4–10.2)
CHLORIDE SERPL-SCNC: 103 MMOL/L (ref 98–107)
CO2 SERPL-SCNC: 29 MMOL/L (ref 22–29)
CREAT SERPL-MCNC: 0.77 MG/DL (ref 0.72–1.25)
CRP SERPL HS-MCNC: 9.02 MG/L (ref 0–5)
EOSINOPHIL # BLD AUTO: 0.19 X10(3)/MCL (ref 0–0.9)
EOSINOPHIL NFR BLD AUTO: 2.7 % (ref 0–6.5)
ERYTHROCYTE [DISTWIDTH] IN BLOOD BY AUTOMATED COUNT: 14.7 % (ref 11.5–17)
ERYTHROCYTE [SEDIMENTATION RATE] IN BLOOD: 28 MM/HR (ref 0–20)
GLOBULIN SER-MCNC: 4.1 GM/DL (ref 2.4–3.5)
GLUCOSE SERPL-MCNC: 156 MG/DL (ref 74–100)
HCT VFR BLD AUTO: 38.1 % (ref 42–52)
HGB BLD-MCNC: 11.9 GM/DL (ref 14–18)
IMM GRANULOCYTES # BLD AUTO: 0.02 10*3/UL (ref 0–0.02)
IMM GRANULOCYTES NFR BLD AUTO: 0.3 % (ref 0–0.43)
LYMPHOCYTES # BLD AUTO: 3.47 X10(3)/MCL (ref 0.6–4.6)
LYMPHOCYTES NFR BLD AUTO: 49.6 % (ref 16.2–38.3)
MCH RBC QN AUTO: 26.7 PG (ref 27–31)
MCHC RBC AUTO-ENTMCNC: 31.2 GM/DL (ref 33–36)
MCV RBC AUTO: 85.6 FL (ref 80–94)
MONOCYTES # BLD AUTO: 0.51 X10(3)/MCL (ref 0.1–1.3)
MONOCYTES NFR BLD AUTO: 7.3 % (ref 4.7–11.3)
NEUTROPHILS # BLD AUTO: 2.77 X10(3)/MCL (ref 2.1–9.2)
NEUTROPHILS NFR BLD AUTO: 39.7 % (ref 49.1–73.4)
NRBC BLD AUTO-RTO: 0 % (ref 0–0.2)
PLATELET # BLD AUTO: 370 X10(3)/MCL (ref 130–400)
PMV BLD AUTO: 9.5 FL (ref 7.4–10.4)
POTASSIUM SERPL-SCNC: 3.8 MMOL/L (ref 3.5–5.1)
PREALB SERPL-MCNC: 25 MG/DL (ref 18–45)
PROT SERPL-MCNC: 7.6 GM/DL (ref 6.4–8.3)
RBC # BLD AUTO: 4.45 X10(6)/MCL (ref 4.7–6.1)
SODIUM SERPL-SCNC: 140 MMOL/L (ref 136–145)
VANCOMYCIN TROUGH SERPL-MCNC: 15.8 UG/ML (ref 15–20)
WBC # SPEC AUTO: 7 X10(3)/MCL (ref 4.5–11.5)

## 2020-04-24 LAB — VANCOMYCIN TROUGH SERPL-MCNC: 16.7 UG/ML (ref 15–20)

## 2020-04-27 LAB
ABS NEUT (OLG): 3 X10(3)/MCL (ref 2.1–9.2)
ALBUMIN SERPL-MCNC: 3.3 GM/DL (ref 3.5–5)
ALBUMIN/GLOB SERPL: 0.8 RATIO (ref 1.1–2)
ALP SERPL-CCNC: 74 UNIT/L (ref 40–150)
ALT SERPL-CCNC: 33 UNIT/L (ref 0–55)
AST SERPL-CCNC: 18 UNIT/L (ref 5–34)
BASOPHILS # BLD AUTO: 0.03 X10(3)/MCL (ref 0–0.2)
BASOPHILS NFR BLD AUTO: 0.4 % (ref 0–0.9)
BILIRUB SERPL-MCNC: 0.3 MG/DL (ref 0.2–1.2)
BILIRUBIN DIRECT+TOT PNL SERPL-MCNC: 0.1 MG/DL (ref 0–0.5)
BILIRUBIN DIRECT+TOT PNL SERPL-MCNC: 0.2 MG/DL (ref 0–0.8)
BUN SERPL-MCNC: 12.4 MG/DL (ref 8.4–25.7)
CALCIUM SERPL-MCNC: 9.2 MG/DL (ref 8.4–10.2)
CHLORIDE SERPL-SCNC: 103 MMOL/L (ref 98–107)
CO2 SERPL-SCNC: 26 MMOL/L (ref 22–29)
CREAT SERPL-MCNC: 0.84 MG/DL (ref 0.72–1.25)
CRP SERPL HS-MCNC: 49.25 MG/L (ref 0–5)
EOSINOPHIL # BLD AUTO: 0.17 X10(3)/MCL (ref 0–0.9)
EOSINOPHIL NFR BLD AUTO: 2.3 % (ref 0–6.5)
ERYTHROCYTE [DISTWIDTH] IN BLOOD BY AUTOMATED COUNT: 14.7 % (ref 11.5–17)
ERYTHROCYTE [SEDIMENTATION RATE] IN BLOOD: 35 MM/HR (ref 0–20)
GLOBULIN SER-MCNC: 3.9 GM/DL (ref 2.4–3.5)
GLUCOSE SERPL-MCNC: 128 MG/DL (ref 74–100)
HCT VFR BLD AUTO: 34.6 % (ref 42–52)
HGB BLD-MCNC: 11.1 GM/DL (ref 14–18)
IMM GRANULOCYTES # BLD AUTO: 0.02 10*3/UL (ref 0–0.02)
IMM GRANULOCYTES NFR BLD AUTO: 0.3 % (ref 0–0.43)
LYMPHOCYTES # BLD AUTO: 3.46 X10(3)/MCL (ref 0.6–4.6)
LYMPHOCYTES NFR BLD AUTO: 47.5 % (ref 16.2–38.3)
MCH RBC QN AUTO: 28 PG (ref 27–31)
MCHC RBC AUTO-ENTMCNC: 32.1 GM/DL (ref 33–36)
MCV RBC AUTO: 87.4 FL (ref 80–94)
MONOCYTES # BLD AUTO: 0.6 X10(3)/MCL (ref 0.1–1.3)
MONOCYTES NFR BLD AUTO: 8.2 % (ref 4.7–11.3)
NEUTROPHILS # BLD AUTO: 3 X10(3)/MCL (ref 2.1–9.2)
NEUTROPHILS NFR BLD AUTO: 41.3 % (ref 49.1–73.4)
NRBC BLD AUTO-RTO: 0 % (ref 0–0.2)
PLATELET # BLD AUTO: 281 X10(3)/MCL (ref 130–400)
PMV BLD AUTO: 9.4 FL (ref 7.4–10.4)
POTASSIUM SERPL-SCNC: 3.6 MMOL/L (ref 3.5–5.1)
PREALB SERPL-MCNC: 17.6 MG/DL (ref 18–45)
PROT SERPL-MCNC: 7.2 GM/DL (ref 6.4–8.3)
RBC # BLD AUTO: 3.96 X10(6)/MCL (ref 4.7–6.1)
SODIUM SERPL-SCNC: 139 MMOL/L (ref 136–145)
VANCOMYCIN TROUGH SERPL-MCNC: 14 UG/ML (ref 15–20)
WBC # SPEC AUTO: 7.3 X10(3)/MCL (ref 4.5–11.5)

## 2020-05-04 LAB
ABS NEUT (OLG): 4.43 X10(3)/MCL (ref 2.1–9.2)
ALBUMIN SERPL-MCNC: 3.5 GM/DL (ref 3.5–5)
ALBUMIN/GLOB SERPL: 0.8 RATIO (ref 1.1–2)
ALP SERPL-CCNC: 85 UNIT/L (ref 40–150)
ALT SERPL-CCNC: 23 UNIT/L (ref 0–55)
AST SERPL-CCNC: 18 UNIT/L (ref 5–34)
BASOPHILS # BLD AUTO: 0.03 X10(3)/MCL (ref 0–0.2)
BASOPHILS NFR BLD AUTO: 0.3 % (ref 0–0.9)
BILIRUB SERPL-MCNC: 0.2 MG/DL (ref 0.2–1.2)
BILIRUBIN DIRECT+TOT PNL SERPL-MCNC: 0.1 MG/DL (ref 0–0.5)
BILIRUBIN DIRECT+TOT PNL SERPL-MCNC: 0.1 MG/DL (ref 0–0.8)
BUN SERPL-MCNC: 15.5 MG/DL (ref 8.4–25.7)
CALCIUM SERPL-MCNC: 9.9 MG/DL (ref 8.4–10.2)
CHLORIDE SERPL-SCNC: 102 MMOL/L (ref 98–107)
CO2 SERPL-SCNC: 29 MMOL/L (ref 22–29)
CREAT SERPL-MCNC: 0.89 MG/DL (ref 0.72–1.25)
CRP SERPL HS-MCNC: 27.84 MG/L (ref 0–5)
EOSINOPHIL # BLD AUTO: 0.14 X10(3)/MCL (ref 0–0.9)
EOSINOPHIL NFR BLD AUTO: 1.5 % (ref 0–6.5)
ERYTHROCYTE [DISTWIDTH] IN BLOOD BY AUTOMATED COUNT: 14.4 % (ref 11.5–17)
ERYTHROCYTE [SEDIMENTATION RATE] IN BLOOD: 44 MM/HR (ref 0–20)
GLOBULIN SER-MCNC: 4.2 GM/DL (ref 2.4–3.5)
GLUCOSE SERPL-MCNC: 109 MG/DL (ref 74–100)
HCT VFR BLD AUTO: 37 % (ref 42–52)
HGB BLD-MCNC: 11.8 GM/DL (ref 14–18)
IMM GRANULOCYTES # BLD AUTO: 0.02 10*3/UL (ref 0–0.02)
IMM GRANULOCYTES NFR BLD AUTO: 0.2 % (ref 0–0.43)
LYMPHOCYTES # BLD AUTO: 4.05 X10(3)/MCL (ref 0.6–4.6)
LYMPHOCYTES NFR BLD AUTO: 43.2 % (ref 16.2–38.3)
MCH RBC QN AUTO: 28 PG (ref 27–31)
MCHC RBC AUTO-ENTMCNC: 31.9 GM/DL (ref 33–36)
MCV RBC AUTO: 87.7 FL (ref 80–94)
MONOCYTES # BLD AUTO: 0.71 X10(3)/MCL (ref 0.1–1.3)
MONOCYTES NFR BLD AUTO: 7.6 % (ref 4.7–11.3)
NEUTROPHILS # BLD AUTO: 4.43 X10(3)/MCL (ref 2.1–9.2)
NEUTROPHILS NFR BLD AUTO: 47.2 % (ref 49.1–73.4)
NRBC BLD AUTO-RTO: 0 % (ref 0–0.2)
PLATELET # BLD AUTO: 346 X10(3)/MCL (ref 130–400)
PMV BLD AUTO: 9.7 FL (ref 7.4–10.4)
POTASSIUM SERPL-SCNC: 3.9 MMOL/L (ref 3.5–5.1)
PREALB SERPL-MCNC: 21.9 MG/DL (ref 18–45)
PROT SERPL-MCNC: 7.7 GM/DL (ref 6.4–8.3)
RBC # BLD AUTO: 4.22 X10(6)/MCL (ref 4.7–6.1)
SODIUM SERPL-SCNC: 141 MMOL/L (ref 136–145)
WBC # SPEC AUTO: 9.4 X10(3)/MCL (ref 4.5–11.5)

## 2020-06-09 ENCOUNTER — HISTORICAL (OUTPATIENT)
Dept: ADMINISTRATIVE | Facility: HOSPITAL | Age: 55
End: 2020-06-09

## 2020-06-09 LAB — TSH SERPL-ACNC: 0.94 MIU/ML (ref 0.45–4.5)

## 2020-06-19 LAB
BILIRUB SERPL-MCNC: NEGATIVE MG/DL
BLOOD URINE, POC: NORMAL
CLARITY, POC UA: NORMAL
COLOR, POC UA: NORMAL
GLUCOSE UR QL STRIP: NEGATIVE
KETONES UR QL STRIP: NEGATIVE
LEUKOCYTE EST, POC UA: NORMAL
NITRITE, POC UA: POSITIVE
PH, POC UA: 6
PROTEIN, POC: NEGATIVE
SPECIFIC GRAVITY, POC UA: 1.01
UROBILINOGEN, POC UA: NORMAL

## 2020-07-21 ENCOUNTER — HISTORICAL (OUTPATIENT)
Dept: ADMINISTRATIVE | Facility: HOSPITAL | Age: 55
End: 2020-07-21

## 2020-07-21 LAB
ALBUMIN SERPL-MCNC: 4.3 G/DL (ref 3.8–4.9)
ALBUMIN/GLOB SERPL: 1.4 {RATIO} (ref 1.2–2.2)
ALP SERPL-CCNC: 74 IU/L (ref 39–117)
ALT SERPL-CCNC: 18 IU/L (ref 0–44)
AST SERPL-CCNC: 40 IU/L (ref 0–40)
BASOPHILS # BLD AUTO: 0 X10E3/UL (ref 0–0.2)
BASOPHILS NFR BLD AUTO: 0 %
BILIRUB SERPL-MCNC: 0.3 MG/DL (ref 0–1.2)
BUN SERPL-MCNC: 15 MG/DL (ref 6–24)
CALCIUM SERPL-MCNC: 10 MG/DL (ref 8.7–10.2)
CHLORIDE SERPL-SCNC: 98 MMOL/L (ref 96–106)
CHOLEST SERPL-MCNC: 163 MG/DL (ref 100–199)
CHOLEST/HDLC SERPL: 6.5 RATIO (ref 0–5)
CO2 SERPL-SCNC: 26 MMOL/L (ref 20–29)
CREAT SERPL-MCNC: 1.06 MG/DL (ref 0.76–1.27)
CREAT/UREA NIT SERPL: 14 (ref 9–20)
DEPRECATED CALCIDIOL+CALCIFEROL SERPL-MC: 23.7 NG/ML (ref 30–100)
EOSINOPHIL # BLD AUTO: 0.1 X10E3/UL (ref 0–0.4)
EOSINOPHIL NFR BLD AUTO: 1 %
ERYTHROCYTE [DISTWIDTH] IN BLOOD BY AUTOMATED COUNT: 16.7 % (ref 11.6–15.4)
GLOBULIN SER-MCNC: 3.1 G/DL (ref 1.5–4.5)
GLUCOSE SERPL-MCNC: 154 MG/DL (ref 65–99)
HCT VFR BLD AUTO: 44.2 % (ref 37.5–51)
HDLC SERPL-MCNC: 25 MG/DL
HGB BLD-MCNC: 13.6 G/DL (ref 13–17.7)
LDLC SERPL CALC-MCNC: 97 MG/DL (ref 0–99)
LYMPHOCYTES # BLD AUTO: 2.7 X10E3/UL (ref 0.7–3.1)
LYMPHOCYTES NFR BLD AUTO: 39 %
MCH RBC QN AUTO: 27.8 PG (ref 26.6–33)
MCHC RBC AUTO-ENTMCNC: 30.8 G/DL (ref 31.5–35.7)
MCV RBC AUTO: 90 FL (ref 79–97)
MONOCYTES # BLD AUTO: 0.5 X10E3/UL (ref 0.1–0.9)
MONOCYTES NFR BLD AUTO: 7 %
NEUTROPHILS # BLD AUTO: 3.5 X10E3/UL (ref 1.4–7)
NEUTROPHILS NFR BLD AUTO: 53 %
PLATELET # BLD AUTO: 450 X10E3/UL (ref 150–450)
POTASSIUM SERPL-SCNC: 4.4 MMOL/L (ref 3.5–5.2)
PROT SERPL-MCNC: 7.4 G/DL (ref 6–8.5)
PSA SERPL-MCNC: 1 NG/ML (ref 0–4)
RBC # BLD AUTO: 4.89 X10(6)/MCL (ref 4.14–5.8)
SODIUM SERPL-SCNC: 141 MMOL/L (ref 134–144)
TRIGL SERPL-MCNC: 204 MG/DL (ref 0–149)
TSH SERPL-ACNC: 2.12 MIU/ML (ref 0.45–4.5)
VLDLC SERPL CALC-MCNC: 41 MG/DL (ref 5–40)
WBC # SPEC AUTO: 6.8 X10E3/UL (ref 3.4–10.8)

## 2020-08-25 LAB
BILIRUB SERPL-MCNC: NEGATIVE MG/DL
BLOOD URINE, POC: NORMAL
CLARITY, POC UA: NORMAL
COLOR, POC UA: NORMAL
GLUCOSE UR QL STRIP: NORMAL
KETONES UR QL STRIP: NEGATIVE
LEUKOCYTE EST, POC UA: NORMAL
NITRITE, POC UA: NEGATIVE
PH, POC UA: 6
PROTEIN, POC: NEGATIVE
SPECIFIC GRAVITY, POC UA: 1
UROBILINOGEN, POC UA: NORMAL

## 2020-09-16 LAB
BILIRUB SERPL-MCNC: NEGATIVE MG/DL
BLOOD URINE, POC: NORMAL
CLARITY, POC UA: CLEAR
COLOR, POC UA: YELLOW
GLUCOSE UR QL STRIP: NORMAL
KETONES UR QL STRIP: NEGATIVE
LEUKOCYTE EST, POC UA: NORMAL
NITRITE, POC UA: POSITIVE
PH, POC UA: 6
PROTEIN, POC: NORMAL
SPECIFIC GRAVITY, POC UA: NORMAL
UROBILINOGEN, POC UA: NORMAL

## 2020-11-13 ENCOUNTER — HISTORICAL (OUTPATIENT)
Dept: ADMINISTRATIVE | Facility: HOSPITAL | Age: 55
End: 2020-11-13

## 2020-11-13 LAB
ABS NEUT (OLG): 3.16 X10(3)/MCL (ref 2.1–9.2)
BASOPHILS # BLD AUTO: 0 X10(3)/MCL (ref 0–0.2)
BASOPHILS NFR BLD AUTO: 0 %
BUN SERPL-MCNC: 16.3 MG/DL (ref 8.4–25.7)
CALCIUM SERPL-MCNC: 8.9 MG/DL (ref 8.4–10.2)
CHLORIDE SERPL-SCNC: 102 MMOL/L (ref 98–107)
CO2 SERPL-SCNC: 25 MMOL/L (ref 22–29)
CREAT SERPL-MCNC: 0.99 MG/DL (ref 0.73–1.18)
EOSINOPHIL # BLD AUTO: 0.1 X10(3)/MCL (ref 0–0.9)
EOSINOPHIL NFR BLD AUTO: 2 %
ERYTHROCYTE [DISTWIDTH] IN BLOOD BY AUTOMATED COUNT: 17.5 % (ref 11.5–17)
GLUCOSE SERPL-MCNC: 109 MG/DL (ref 74–100)
HCT VFR BLD AUTO: 44.1 % (ref 42–52)
HGB BLD-MCNC: 14.1 GM/DL (ref 14–18)
LYMPHOCYTES # BLD AUTO: 2.7 X10(3)/MCL (ref 0.6–4.6)
LYMPHOCYTES NFR BLD AUTO: 42 %
MCH RBC QN AUTO: 27.7 PG (ref 27–31)
MCHC RBC AUTO-ENTMCNC: 32 GM/DL (ref 33–36)
MCV RBC AUTO: 86.6 FL (ref 80–94)
MONOCYTES # BLD AUTO: 0.4 X10(3)/MCL (ref 0.1–1.3)
MONOCYTES NFR BLD AUTO: 7 %
NEUTROPHILS # BLD AUTO: 3.16 X10(3)/MCL (ref 2.1–9.2)
NEUTROPHILS NFR BLD AUTO: 49 %
PLATELET # BLD AUTO: 409 X10(3)/MCL (ref 130–400)
PMV BLD AUTO: 10.2 FL (ref 9.4–12.4)
POTASSIUM SERPL-SCNC: 3.8 MMOL/L (ref 3.5–5.1)
RBC # BLD AUTO: 5.09 X10(6)/MCL (ref 4.7–6.1)
RESTICK: NORMAL
SODIUM SERPL-SCNC: 139 MMOL/L (ref 136–145)
WBC # SPEC AUTO: 6.5 X10(3)/MCL (ref 4.5–11.5)

## 2020-11-19 ENCOUNTER — HISTORICAL (OUTPATIENT)
Dept: ADMINISTRATIVE | Facility: HOSPITAL | Age: 55
End: 2020-11-19

## 2020-11-19 LAB
BASOPHILS # BLD AUTO: 0 X10E3/UL (ref 0–0.2)
BASOPHILS NFR BLD AUTO: 1 %
DEPRECATED CALCIDIOL+CALCIFEROL SERPL-MC: 18.1 NG/ML (ref 30–100)
EOSINOPHIL # BLD AUTO: 0.1 X10E3/UL (ref 0–0.4)
EOSINOPHIL NFR BLD AUTO: 1 %
ERYTHROCYTE [DISTWIDTH] IN BLOOD BY AUTOMATED COUNT: 15.8 % (ref 11.6–15.4)
HCT VFR BLD AUTO: 39.2 % (ref 37.5–51)
HGB BLD-MCNC: 11.7 G/DL (ref 13–17.7)
LYMPHOCYTES # BLD AUTO: 2.5 X10E3/UL (ref 0.7–3.1)
LYMPHOCYTES NFR BLD AUTO: 35 %
MCH RBC QN AUTO: 27.3 PG (ref 26.6–33)
MCHC RBC AUTO-ENTMCNC: 29.8 G/DL (ref 31.5–35.7)
MCV RBC AUTO: 92 FL (ref 79–97)
MONOCYTES # BLD AUTO: 0.4 X10E3/UL (ref 0.1–0.9)
MONOCYTES NFR BLD AUTO: 6 %
NEUTROPHILS # BLD AUTO: 4 X10E3/UL (ref 1.4–7)
NEUTROPHILS NFR BLD AUTO: 57 %
PLATELET # BLD AUTO: 467 X10E3/UL (ref 150–450)
RBC # BLD AUTO: 4.28 X10(6)/MCL (ref 4.14–5.8)
WBC # SPEC AUTO: 7.1 X10E3/UL (ref 3.4–10.8)

## 2020-12-22 ENCOUNTER — HISTORICAL (OUTPATIENT)
Dept: ADMINISTRATIVE | Facility: HOSPITAL | Age: 55
End: 2020-12-22

## 2021-01-15 ENCOUNTER — HISTORICAL (OUTPATIENT)
Dept: INFECTIOUS DISEASES | Facility: HOSPITAL | Age: 56
End: 2021-01-15

## 2021-02-02 ENCOUNTER — HISTORICAL (OUTPATIENT)
Dept: ADMINISTRATIVE | Facility: HOSPITAL | Age: 56
End: 2021-02-02

## 2021-03-30 ENCOUNTER — HISTORICAL (OUTPATIENT)
Dept: ADMINISTRATIVE | Facility: HOSPITAL | Age: 56
End: 2021-03-30

## 2021-03-30 LAB
BASOPHILS # BLD AUTO: 0 X10E3/UL (ref 0–0.2)
BASOPHILS NFR BLD AUTO: 0 %
DEPRECATED CALCIDIOL+CALCIFEROL SERPL-MC: 24.5 NG/ML (ref 30–100)
EOSINOPHIL # BLD AUTO: 0.1 X10E3/UL (ref 0–0.4)
EOSINOPHIL NFR BLD AUTO: 2 %
ERYTHROCYTE [DISTWIDTH] IN BLOOD BY AUTOMATED COUNT: 17.4 % (ref 11.6–15.4)
HCT VFR BLD AUTO: 40.7 % (ref 37.5–51)
HGB BLD-MCNC: 12 G/DL (ref 13–17.7)
LYMPHOCYTES # BLD AUTO: 1.9 X10E3/UL (ref 0.7–3.1)
LYMPHOCYTES NFR BLD AUTO: 35 %
MCH RBC QN AUTO: 25.6 PG (ref 26.6–33)
MCHC RBC AUTO-ENTMCNC: 29.5 G/DL (ref 31.5–35.7)
MCV RBC AUTO: 87 FL (ref 79–97)
MONOCYTES # BLD AUTO: 0.4 X10E3/UL (ref 0.1–0.9)
MONOCYTES NFR BLD AUTO: 8 %
NEUTROPHILS # BLD AUTO: 2.9 X10E3/UL (ref 1.4–7)
NEUTROPHILS NFR BLD AUTO: 55 %
PLATELET # BLD AUTO: 508 X10E3/UL (ref 150–450)
RBC # BLD AUTO: 4.68 X10(6)/MCL (ref 4.14–5.8)
WBC # SPEC AUTO: 5.4 X10E3/UL (ref 3.4–10.8)

## 2021-06-10 ENCOUNTER — HISTORICAL (OUTPATIENT)
Dept: ADMINISTRATIVE | Facility: HOSPITAL | Age: 56
End: 2021-06-10

## 2021-06-10 LAB
BASOPHILS # BLD AUTO: 0 X10E3/UL (ref 0–0.2)
BASOPHILS NFR BLD AUTO: 1 %
DEPRECATED CALCIDIOL+CALCIFEROL SERPL-MC: 22.5 NG/ML (ref 30–100)
EOSINOPHIL # BLD AUTO: 0.1 X10E3/UL (ref 0–0.4)
EOSINOPHIL NFR BLD AUTO: 1 %
ERYTHROCYTE [DISTWIDTH] IN BLOOD BY AUTOMATED COUNT: 17.1 % (ref 11.6–15.4)
ERYTHROCYTE [SEDIMENTATION RATE] IN BLOOD: 110 MM/HR (ref 0–30)
HCT VFR BLD AUTO: 32 % (ref 37.5–51)
HGB BLD-MCNC: 9.2 G/DL (ref 13–17.7)
LYMPHOCYTES # BLD AUTO: 2.7 X10E3/UL (ref 0.7–3.1)
LYMPHOCYTES NFR BLD AUTO: 32 %
MCH RBC QN AUTO: 24 PG (ref 26.6–33)
MCHC RBC AUTO-ENTMCNC: 28.8 G/DL (ref 31.5–35.7)
MCV RBC AUTO: 83 FL (ref 79–97)
MONOCYTES # BLD AUTO: 0.5 X10E3/UL (ref 0.1–0.9)
MONOCYTES NFR BLD AUTO: 6 %
NEUTROPHILS # BLD AUTO: 5.1 X10E3/UL (ref 1.4–7)
NEUTROPHILS NFR BLD AUTO: 60 %
PLATELET # BLD AUTO: 829 X10E3/UL (ref 150–450)
PREALB SERPL-MCNC: 16 MG/DL (ref 10–36)
RBC # BLD AUTO: 3.84 X10(6)/MCL (ref 4.14–5.8)
TSH SERPL-ACNC: 1.2 MIU/ML (ref 0.45–4.5)
WBC # SPEC AUTO: 8.4 X10E3/UL (ref 3.4–10.8)

## 2021-08-12 ENCOUNTER — HISTORICAL (OUTPATIENT)
Dept: ADMINISTRATIVE | Facility: HOSPITAL | Age: 56
End: 2021-08-12

## 2021-08-12 LAB
ABS NEUT (OLG): 3.22 X10(3)/MCL (ref 2.1–9.2)
ALBUMIN SERPL-MCNC: 3.6 GM/DL (ref 3.5–5)
ALBUMIN/GLOB SERPL: 0.8 RATIO (ref 1.1–2)
ALP SERPL-CCNC: 90 UNIT/L (ref 40–150)
ALT SERPL-CCNC: 13 UNIT/L (ref 0–55)
AST SERPL-CCNC: 17 UNIT/L (ref 5–34)
BASOPHILS # BLD AUTO: 0 X10(3)/MCL (ref 0–0.2)
BASOPHILS NFR BLD AUTO: 0 %
BILIRUB SERPL-MCNC: 0.3 MG/DL
BILIRUBIN DIRECT+TOT PNL SERPL-MCNC: <0.1 MG/DL (ref 0–0.5)
BILIRUBIN DIRECT+TOT PNL SERPL-MCNC: >0.2 MG/DL (ref 0–0.8)
BUN SERPL-MCNC: 15.7 MG/DL (ref 8.4–25.7)
CALCIUM SERPL-MCNC: 9.6 MG/DL (ref 8.4–10.2)
CHLORIDE SERPL-SCNC: 102 MMOL/L (ref 98–107)
CO2 SERPL-SCNC: 26 MMOL/L (ref 22–29)
CREAT SERPL-MCNC: 1.51 MG/DL (ref 0.73–1.18)
CRP SERPL-MCNC: 0.63 MG/DL
EOSINOPHIL # BLD AUTO: 0 X10(3)/MCL (ref 0–0.9)
EOSINOPHIL NFR BLD AUTO: 1 %
ERYTHROCYTE [DISTWIDTH] IN BLOOD BY AUTOMATED COUNT: 19.9 % (ref 11.5–17)
ERYTHROCYTE [SEDIMENTATION RATE] IN BLOOD: 40 MM/HR (ref 0–15)
GLOBULIN SER-MCNC: 4.8 GM/DL (ref 2.4–3.5)
GLUCOSE SERPL-MCNC: 92 MG/DL (ref 74–100)
HCT VFR BLD AUTO: 40.9 % (ref 42–52)
HGB BLD-MCNC: 11.9 GM/DL (ref 14–18)
INFLUENZA A ANTIGEN, POC: NEGATIVE
INFLUENZA B ANTIGEN, POC: NEGATIVE
LYMPHOCYTES # BLD AUTO: 3.5 X10(3)/MCL (ref 0.6–4.6)
LYMPHOCYTES NFR BLD AUTO: 47 %
MCH RBC QN AUTO: 24.1 PG (ref 27–31)
MCHC RBC AUTO-ENTMCNC: 29.1 GM/DL (ref 33–36)
MCV RBC AUTO: 82.8 FL (ref 80–94)
MONOCYTES # BLD AUTO: 0.6 X10(3)/MCL (ref 0.1–1.3)
MONOCYTES NFR BLD AUTO: 8 %
NEUTROPHILS # BLD AUTO: 3.22 X10(3)/MCL (ref 2.1–9.2)
NEUTROPHILS NFR BLD AUTO: 43 %
PLATELET # BLD AUTO: 607 X10(3)/MCL (ref 130–400)
PMV BLD AUTO: 9.9 FL (ref 9.4–12.4)
POTASSIUM SERPL-SCNC: 4.9 MMOL/L (ref 3.5–5.1)
PROT SERPL-MCNC: 8.4 GM/DL (ref 6.4–8.3)
RBC # BLD AUTO: 4.94 X10(6)/MCL (ref 4.7–6.1)
SODIUM SERPL-SCNC: 141 MMOL/L (ref 136–145)
WBC # SPEC AUTO: 7.4 X10(3)/MCL (ref 4.5–11.5)

## 2021-08-13 ENCOUNTER — HISTORICAL (OUTPATIENT)
Dept: ADMINISTRATIVE | Facility: HOSPITAL | Age: 56
End: 2021-08-13

## 2021-08-13 LAB
APPEARANCE, UA: ABNORMAL
BACTERIA SPEC CULT: ABNORMAL /HPF
BILIRUB UR QL STRIP: NEGATIVE
COLOR UR: YELLOW
GLUCOSE (UA): NEGATIVE
HGB UR QL STRIP: ABNORMAL
KETONES UR QL STRIP: NEGATIVE
LEUKOCYTE ESTERASE UR QL STRIP: ABNORMAL
NITRITE UR QL STRIP: POSITIVE
PH UR STRIP: 7.5 [PH] (ref 5–9)
PROT UR QL STRIP: NEGATIVE
RBC #/AREA URNS HPF: 22 /HPF (ref 0–2)
SP GR UR STRIP: 1.01 (ref 1–1.03)
SQUAMOUS EPITHELIAL, UA: ABNORMAL /HPF (ref 0–4)
UROBILINOGEN UR STRIP-ACNC: 0.2
WBC #/AREA URNS HPF: 242 /HPF (ref 0–3)

## 2021-10-05 ENCOUNTER — HISTORICAL (OUTPATIENT)
Dept: ADMINISTRATIVE | Facility: HOSPITAL | Age: 56
End: 2021-10-05

## 2021-10-05 LAB
ALBUMIN SERPL-MCNC: 4.1 G/DL (ref 3.8–4.9)
ALBUMIN/GLOB SERPL: 1 {RATIO} (ref 1.2–2.2)
ALP SERPL-CCNC: 108 IU/L (ref 44–121)
ALT SERPL-CCNC: 9 IU/L (ref 0–44)
AST SERPL-CCNC: 15 IU/L (ref 0–40)
BASOPHILS # BLD AUTO: 0 X10E3/UL (ref 0–0.2)
BASOPHILS NFR BLD AUTO: 0 %
BILIRUB SERPL-MCNC: 0.2 MG/DL (ref 0–1.2)
BUN SERPL-MCNC: 17 MG/DL (ref 6–24)
CALCIUM SERPL-MCNC: 9.7 MG/DL (ref 8.7–10.2)
CHLORIDE SERPL-SCNC: 100 MMOL/L (ref 96–106)
CO2 SERPL-SCNC: 24 MMOL/L (ref 20–29)
CREAT SERPL-MCNC: 0.78 MG/DL (ref 0.76–1.27)
CREAT/UREA NIT SERPL: 22 (ref 9–20)
CRP SERPL HS-MCNC: 13.54 MG/L (ref 0–3)
EOSINOPHIL # BLD AUTO: 0.1 X10E3/UL (ref 0–0.4)
EOSINOPHIL NFR BLD AUTO: 1 %
ERYTHROCYTE [DISTWIDTH] IN BLOOD BY AUTOMATED COUNT: 16.9 % (ref 11.6–15.4)
ERYTHROCYTE [SEDIMENTATION RATE] IN BLOOD: 122 MM/HR (ref 0–30)
GLOBULIN SER-MCNC: 4.1 G/DL (ref 1.5–4.5)
GLUCOSE SERPL-MCNC: 107 MG/DL (ref 65–99)
HCT VFR BLD AUTO: 41.2 % (ref 37.5–51)
HGB BLD-MCNC: 12.2 G/DL (ref 13–17.7)
LYMPHOCYTES # BLD AUTO: 3.5 X10E3/UL (ref 0.7–3.1)
LYMPHOCYTES NFR BLD AUTO: 39 %
MCH RBC QN AUTO: 23.9 PG (ref 26.6–33)
MCHC RBC AUTO-ENTMCNC: 29.6 G/DL (ref 31.5–35.7)
MCV RBC AUTO: 81 FL (ref 79–97)
MONOCYTES # BLD AUTO: 0.8 X10E3/UL (ref 0.1–0.9)
MONOCYTES NFR BLD AUTO: 9 %
NEUTROPHILS # BLD AUTO: 4.6 X10E3/UL (ref 1.4–7)
NEUTROPHILS NFR BLD AUTO: 51 %
PLATELET # BLD AUTO: 688 X10E3/UL (ref 150–450)
POTASSIUM SERPL-SCNC: 4.2 MMOL/L (ref 3.5–5.2)
PROT SERPL-MCNC: 8.2 G/DL (ref 6–8.5)
RBC # BLD AUTO: 5.11 X10(6)/MCL (ref 4.14–5.8)
SODIUM SERPL-SCNC: 141 MMOL/L (ref 134–144)
WBC # SPEC AUTO: 8.9 X10E3/UL (ref 3.4–10.8)

## 2021-10-20 ENCOUNTER — HISTORICAL (OUTPATIENT)
Dept: ADMINISTRATIVE | Facility: HOSPITAL | Age: 56
End: 2021-10-20

## 2021-10-20 LAB
APPEARANCE, UA: ABNORMAL
BACTERIA #/AREA URNS AUTO: ABNORMAL /HPF
BILIRUB UR QL STRIP: ABNORMAL
BUN SERPL-MCNC: 26.4 MG/DL (ref 8.4–25.7)
CALCIUM SERPL-MCNC: 9 MG/DL (ref 8.7–10.5)
CHLORIDE SERPL-SCNC: 103 MMOL/L (ref 98–107)
CO2 SERPL-SCNC: 25 MMOL/L (ref 22–29)
COLOR UR: ABNORMAL
CREAT SERPL-MCNC: 1 MG/DL (ref 0.73–1.18)
CREAT UR-MCNC: 309.1 MG/DL (ref 58–161)
CREAT/UREA NIT SERPL: 26
GLUCOSE (UA): NEGATIVE
GLUCOSE SERPL-MCNC: 124 MG/DL (ref 74–100)
HGB UR QL STRIP: ABNORMAL
KETONES UR QL STRIP: ABNORMAL
LEUKOCYTE ESTERASE UR QL STRIP: ABNORMAL
MICROALBUMIN UR-MCNC: 77.3 UG/ML
MICROALBUMIN/CREAT RATIO PNL UR: 25 MG/GM CR (ref 0–30)
NITRITE UR QL STRIP.AUTO: NEGATIVE
PH UR STRIP: 5.5 [PH] (ref 5–9)
POTASSIUM SERPL-SCNC: 4.2 MMOL/L (ref 3.5–5.1)
PROT UR QL STRIP: ABNORMAL
PSA SERPL-MCNC: 0.63 NG/ML
RBC #/AREA URNS HPF: ABNORMAL /HPF
SODIUM SERPL-SCNC: 141 MMOL/L (ref 136–145)
SP GR UR STRIP: 1.03 (ref 1–1.03)
SQUAMOUS EPITHELIAL, UA: ABNORMAL /HPF (ref 0–4)
UROBILINOGEN UR STRIP-ACNC: 1
WBC #/AREA URNS AUTO: 706 /HPF (ref 0–3)

## 2021-12-30 ENCOUNTER — HISTORICAL (OUTPATIENT)
Dept: ADMINISTRATIVE | Facility: HOSPITAL | Age: 56
End: 2021-12-30

## 2021-12-30 LAB
ABS NEUT (OLG): 3.3 X10(3)/MCL (ref 2.1–9.2)
BASOPHILS # BLD AUTO: 0 X10(3)/MCL (ref 0–0.2)
BASOPHILS NFR BLD AUTO: 0 %
BUN SERPL-MCNC: 10 MG/DL (ref 8.4–25.7)
CALCIUM SERPL-MCNC: 9 MG/DL (ref 8.7–10.5)
CHLORIDE SERPL-SCNC: 101 MMOL/L (ref 98–107)
CO2 SERPL-SCNC: 24 MMOL/L (ref 22–29)
CREAT SERPL-MCNC: 0.65 MG/DL (ref 0.73–1.18)
CREAT UR-MCNC: 107.2 MG/DL (ref 58–161)
CREAT/UREA NIT SERPL: 15
DEPRECATED CALCIDIOL+CALCIFEROL SERPL-MC: 27.2 NG/ML (ref 30–80)
EOSINOPHIL # BLD AUTO: 0.1 X10(3)/MCL (ref 0–0.9)
EOSINOPHIL NFR BLD AUTO: 1 %
ERYTHROCYTE [DISTWIDTH] IN BLOOD BY AUTOMATED COUNT: 20 % (ref 11.5–17)
FERRITIN SERPL-MCNC: 24.99 NG/ML (ref 21.81–274.66)
GLUCOSE SERPL-MCNC: 56 MG/DL (ref 74–100)
HCT VFR BLD AUTO: 39.1 % (ref 42–52)
HGB BLD-MCNC: 11.6 GM/DL (ref 14–18)
IRON SATN MFR SERPL: 12 % (ref 20–50)
IRON SERPL-MCNC: 35 UG/DL (ref 65–175)
LYMPHOCYTES # BLD AUTO: 4.1 X10(3)/MCL (ref 0.6–4.6)
LYMPHOCYTES NFR BLD AUTO: 51 %
MCH RBC QN AUTO: 24.9 PG (ref 27–31)
MCHC RBC AUTO-ENTMCNC: 29.7 GM/DL (ref 33–36)
MCV RBC AUTO: 83.9 FL (ref 80–94)
MICROALBUMIN UR-MCNC: 213.5 UG/ML
MICROALBUMIN/CREAT RATIO PNL UR: 199.2 MG/GM CR (ref 0–30)
MONOCYTES # BLD AUTO: 0.5 X10(3)/MCL (ref 0.1–1.3)
MONOCYTES NFR BLD AUTO: 7 %
NEUTROPHILS # BLD AUTO: 3.3 X10(3)/MCL (ref 2.1–9.2)
NEUTROPHILS NFR BLD AUTO: 41 %
PLATELET # BLD AUTO: 514 X10(3)/MCL (ref 130–400)
PMV BLD AUTO: 9.7 FL (ref 7.4–10.4)
POTASSIUM SERPL-SCNC: 4.2 MMOL/L (ref 3.5–5.1)
PSA SERPL-MCNC: 1 NG/ML
RBC # BLD AUTO: 4.66 X10(6)/MCL (ref 4.7–6.1)
SODIUM SERPL-SCNC: 140 MMOL/L (ref 136–145)
TIBC SERPL-MCNC: 249 UG/DL (ref 69–240)
TIBC SERPL-MCNC: 284 UG/DL (ref 250–450)
TRANSFERRIN SERPL-MCNC: 260 MG/DL (ref 174–364)
WBC # SPEC AUTO: 8.1 X10(3)/MCL (ref 4.5–11.5)

## 2022-01-04 LAB
INFLUENZA A ANTIGEN, POC: NEGATIVE
INFLUENZA B ANTIGEN, POC: NEGATIVE

## 2022-04-10 ENCOUNTER — HISTORICAL (OUTPATIENT)
Dept: ADMINISTRATIVE | Facility: HOSPITAL | Age: 57
End: 2022-04-10

## 2022-04-27 ENCOUNTER — HISTORICAL (OUTPATIENT)
Dept: ADMINISTRATIVE | Facility: HOSPITAL | Age: 57
End: 2022-04-27

## 2022-04-27 VITALS
DIASTOLIC BLOOD PRESSURE: 84 MMHG | BODY MASS INDEX: 26.6 KG/M2 | WEIGHT: 190 LBS | HEIGHT: 71 IN | OXYGEN SATURATION: 98 % | SYSTOLIC BLOOD PRESSURE: 170 MMHG

## 2022-04-27 LAB
ABS NEUT (OLG): 2.68 (ref 2.1–9.2)
ALBUMIN SERPL-MCNC: 3.6 G/DL (ref 3.5–5)
ALBUMIN/GLOB SERPL: 0.8 {RATIO} (ref 1.1–2)
ALP SERPL-CCNC: 104 U/L (ref 40–150)
ALT SERPL-CCNC: 19 U/L (ref 0–55)
AST SERPL-CCNC: 30 U/L (ref 5–34)
BASOPHILS # BLD AUTO: 0 10*3/UL (ref 0–0.2)
BASOPHILS NFR BLD AUTO: 0 %
BILIRUB SERPL-MCNC: 0.6 MG/DL
BILIRUBIN DIRECT+TOT PNL SERPL-MCNC: 0.3 (ref 0–0.5)
BILIRUBIN DIRECT+TOT PNL SERPL-MCNC: 0.3 (ref 0–0.8)
BUN SERPL-MCNC: 19.6 MG/DL (ref 8.4–25.7)
CALCIUM SERPL-MCNC: 9.5 MG/DL (ref 8.7–10.5)
CHLORIDE SERPL-SCNC: 97 MMOL/L (ref 98–107)
CO2 SERPL-SCNC: 25 MMOL/L (ref 22–29)
CREAT SERPL-MCNC: 0.9 MG/DL (ref 0.73–1.18)
CRP SERPL-MCNC: 0 MG/L (ref 0–1)
EOSINOPHIL # BLD AUTO: 0 10*3/UL (ref 0–0.9)
EOSINOPHIL NFR BLD AUTO: 1 %
ERYTHROCYTE [DISTWIDTH] IN BLOOD BY AUTOMATED COUNT: 18.8 % (ref 11.5–17)
ERYTHROCYTE [SEDIMENTATION RATE] IN BLOOD: 3 MM/H (ref 0–15)
GLOBULIN SER-MCNC: 4.4 G/DL (ref 2.4–3.5)
GLUCOSE SERPL-MCNC: 48 MG/DL (ref 74–100)
HCT VFR BLD AUTO: 48.2 % (ref 42–52)
HEMOLYSIS INTERF INDEX SERPL-ACNC: 28
HGB BLD-MCNC: 14.5 G/DL (ref 14–18)
ICTERIC INTERF INDEX SERPL-ACNC: 0
IMM GRANULOCYTES # BLD AUTO: 0.01 10*3/UL (ref 0–0.02)
IMM GRANULOCYTES NFR BLD AUTO: 0.2 % (ref 0–0.43)
LIPEMIC INTERF INDEX SERPL-ACNC: 1
LYMPHOCYTES # BLD AUTO: 3.3 10*3/UL (ref 0.6–4.6)
LYMPHOCYTES NFR BLD AUTO: 50 %
MANUAL DIFF? (OHS): NO
MCH RBC QN AUTO: 25.3 PG (ref 27–31)
MCHC RBC AUTO-ENTMCNC: 30.1 G/DL (ref 33–36)
MCV RBC AUTO: 84.1 FL (ref 80–94)
MONOCYTES # BLD AUTO: 0.5 10*3/UL (ref 0.1–1.3)
MONOCYTES NFR BLD AUTO: 8 %
NEUTROPHILS # BLD AUTO: 2.68 10*3/UL (ref 1.4–7.9)
NEUTROPHILS NFR BLD AUTO: 41 %
PLATELET # BLD AUTO: 345 10*3/UL (ref 130–400)
PMV BLD AUTO: 10.1 FL (ref 9.4–12.4)
POTASSIUM SERPL-SCNC: 4.2 MMOL/L (ref 3.5–5.1)
PROT SERPL-MCNC: 8 G/DL (ref 6.4–8.3)
RBC # BLD AUTO: 5.73 10*6/UL (ref 4.7–6.1)
SODIUM SERPL-SCNC: 139 MMOL/L (ref 136–145)
WBC # SPEC AUTO: 6.5 10*3/UL (ref 4.5–11.5)
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
## 2022-04-30 NOTE — OP NOTE
DATE OF SURGERY:    11/13/2020    SURGEON:  Jorge Orellana MD    PREOPERATIVE DIAGNOSIS:  Left intraarticular distal tibia fracture.    POSTOPERATIVE DIAGNOSIS:  Left intraarticular distal tibia fracture.    PROCEDURE:  Open reduction and internal fixation of left intraarticular distal tibia fracture including tibia only.    ANESTHESIA:  General.    ESTIMATED BLOOD LOSS:  75 cc.    ASSISTANT ATTESTATION:  Fidelia Weston, nurse practitioner, was necessary for a skilled set of hands to assist with reduction of the fracture, as well as application of hardware and deep closure.    IMPLANTS:  Brigette 11 x 200 mm T2 ankle fusion nail.    COMPLICATIONS:  None.    COUNTS:  All counts were correct x2 at the end of the case.    INDICATIONS FOR PROCEDURE:  The patient is a 55-year-old male who is paraplegic.  He does have an incomplete paraplegia and does have sensation in his lower extremities.  He sustained a distal tibia fracture.  He is nonambulatory.  However, he had continued pain and deformity in his limb.  The risks and benefits of treatment were discussed and he was brought to the operating room for stabilization of his tibia fracture.    PROCEDURE IN DETAIL:  After informed consent was obtained, the patient was met in the preoperative holding area and the site was marked.  He was taken to the operating room.  He was placed supine onto the operating table and general anesthesia was induced.  All bony prominences were well padded.  Preoperative antibiotics were given.  His left lower extremity was prepped and draped in a standard sterile fashion.  A timeout was done to indicate the correct operative limb and procedure.  A starting point for a retrograde tibiotalar calcaneal fusion nail was obtained with an incision over the plantar aspect of the foot.  A guidewire was passed up, opening reamer was passed.  It was reamed up to 12.5 mm and an 11 mm x 200 mm nail was malleted into position.  Screws were placed  into the talus in a dynamic fashion.  Two screws were placed in the proximal aspect.  Internal compression was performed and a screw was then placed across the calcaneal portion.  They were confirmed to be in appropriate position on AP and lateral imaging.  The jig was removed.  The wounds were thoroughly irrigated and closed using #1 Vicryl, 2-0 Monocryl, and staples.  Xeroform and 4 x 4's, cast padding, and a well-padded footplate splint with stirrups were applied.  The patient was awakened, extubated, and taken recovery in stable condition.    POSTOPERATIVE PLAN:  He will be discharged home today nonweightbearing on the left lower extremity, keep his limb elevated, keep the splint clean and dry.  Followup in 2 weeks for removal of his sutures.        ______________________________  Jorge Orellana MD    BW/UM  DD:  11/13/2020  Time:  01:35PM  DT:  11/13/2020  Time:  01:51PM  Job #:  [id number]
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
## 2022-05-03 NOTE — HISTORICAL [facility]
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 dermis, and subcutaneous tissue, if performed); each additional 20 sq cm, or part thereof (List separately in addition to code for primary procedure) (09/17/2018)  Debridement, muscle and/or fascia (includes epidermis, dermis, and subcutaneous tissue, if performed); each additional 20 sq cm, or part thereof (List separately in addition to code for primary procedure) (09/17/2018)  Debridement, muscle and/or fascia (includes epidermis, dermis, and subcutaneous tissue, if performed); first 20 sq cm or less (09/17/2018)  Excision of Left Hip Muscle, Open Approach (09/17/2018)  Excision of Right Hip Muscle, Open Approach (09/17/2018)  [Endoscopic] polypectomy of rectum (06/18/2013)  Colonoscopy, flexible, proximal to splenic flexure; with biopsy, single or multiple. (06/18/2013)  Back Surgery  Colonoscopy  NECK SURGERY  removed tracheostomy  RIGHT LEG SURGERY  Tracheostomy   Medications  acetaminophen-hydrocodone 325 mg-7.5 mg oral tablet, 1 tab(s), Oral, q4hr  acetaminophen-oxycodone 325 mg-10 mg oral tablet, 1 tab(s), Oral, q8hr,? ?Not Taking, Completed Rx  amitriptyline 50 mg oral tablet, 50 mg= 1 tab(s), Oral, qPM  amlodipine 10 mg oral tablet, 10 mg= 1 tab(s), Oral, Daily  amoxicillin-clavulanate 875 mg-125 mg oral tablet, 875 mg, Oral, q12hr,? ?Not Taking, Completed Rx  atorvastatin 10 mg oral tablet, 5 mg= 0.5  tab(s), Oral, With Supper, 2 refills  BiDil 20 mg-37.5 mg oral tablet, 1 tab(s), Oral, TID  carvedilol 12.5 mg oral tablet, 12.5 mg= 1 tab(s), Oral, BID  ciprofloxacin 500 mg oral tablet, 500 mg= 1 tab(s), Oral, q12hr,? ?Not Taking, Completed Rx  cyclobenzaprine 10 mg oral tablet, 10 mg= 1 tab(s), Oral, BID, 1 refills,? ?Unable to obtain  Decadron 4 mg oral tablet, 1.5 tab, Oral, Daily,? ?Not Taking, Completed Rx  Eliquis Starter Pack for Treatment of DVT and PE 5 mg oral tablet, 5 mg= 1 tab(s), Oral, BID  ergocalciferol 50,000 intl units (1.25 mg) oral capsule, Oral, Daily  fenofibrate 160 mg oral tablet, 160 mg= 1 tab(s), Oral, Daily  Fiber Tabs, 1 tab(s), Oral, Daily  Flomax 0.4 mg oral capsule, 0.4 mg= 1 cap(s), Oral, Daily,? ?Not taking  fluoxetine 20 mg oral capsule, 20 mg= 1 cap(s), Oral, Daily  FLUoxetine 40 mg oral capsule, 40 mg= 1 cap(s), Oral, Daily  gabapentin 300 mg oral capsule, 600 mg= 2 cap(s), Oral, TID, 3 refills  Januvia 100 mg oral tablet, 100 mg= 1 tab(s), Oral, Daily  ketoconazole 2% topical shampoo, TOP, Once  lisinopril 40 mg oral tablet, See Instructions  meloxicam 15 mg oral tablet, 15 mg= 1 tab(s), Oral, Daily  metFORMIN 1000 mg oral tablet, 1000 mg= 1 tab(s), Oral, BID  metformin 500 mg oral tablet, 500 mg= 1 tab(s), Oral, BID  nitrofurantoin macrocrystals-monohydrate 100 mg oral capsule, 100 mg= 1 cap(s), Oral, BID,? ?Not Taking, Completed Rx  Pantoprazole 40 mg ORAL EC-Tablet, 40 mg= 1 tab(s), Oral, Daily  Pepcid 20 mg oral tablet, 20 mg= 1 tab(s), Oral, BID  Percocet 5 mg-325 mg oral tablet, 1 tab(s), Oral, Daily, PRN  tiZANidine 4 mg oral tablet, 4 mg= 1 tab(s), Oral, TID  Toradol 10 mg oral tablet, 10 mg= 1 tab(s), Oral, QID  traZODone 100 mg oral tablet, 100 mg= 1 tab(s), Oral, Once a day (at bedtime), 5 refills,? ?Not taking: Last Dose Date/Time Unknown  Vitamin D3 50,000 intl units (1250 mcg) oral capsule, 43389 IntUnit= 1 cap(s), Oral, qWeek  Allergies  baclofen?(Panic attack,  Itching)  Social History  Abuse/Neglect  No, 01/15/2021  No, 12/01/2020  No, No, 11/25/2020  Alcohol  Current, Beer, 1-2 times per week, 09/27/2012  Substance Use  Past, Marijuana, 04/11/2019  Tobacco  Former smoker, quit more than 30 days ago, N/A, 01/15/2021  Former smoker, quit more than 30 days ago, N/A, 12/01/2020  Former smoker, quit more than 30 days ago, No, 11/25/2020  Family History  Family history is negative  Immunizations  Vaccine Date Status   influenza virus vaccine, inactivated 12/01/2020 Given   influenza virus vaccine, inactivated 09/16/2020 Given   Health Maintenance  Health Maintenance  ???Pending?(in the next year)  ??? ??Due?  ??? ? ? ?Alcohol Misuse Screening due??01/02/21??and every 1??year(s)  ??? ? ? ?Diabetes Maintenance-Eye Exam due??02/02/21??Unknown Frequency  ??? ? ? ?Diabetes Maintenance-Foot Exam due??02/02/21??Unknown Frequency  ??? ? ? ?Lung Cancer Screening due??02/02/21??and every 1??year(s)  ??? ? ? ?Medicare Annual Wellness Exam due??02/02/21??and every 1??year(s)  ??? ? ? ?Zoster Vaccine due??02/02/21??Unknown Frequency  ??? ??Refused?  ??? ? ? ?Smoking Cessation due??01/01/21??and every 1??year(s)  ??? ? ? ?Tetanus Vaccine due??02/02/21??and every 10??year(s)  ??? ??Due In Future?  ??? ? ? ?Aspirin Therapy for CVD Prevention not due until??06/09/21??and every 1??year(s)  ??? ? ? ?Hypertension Management-Education not due until??06/09/21??and every 1??year(s)  ??? ? ? ?Diabetes Maintenance-Fasting Lipid Profile not due until??07/21/21??and every 1??year(s)  ??? ? ? ?Influenza Vaccine not due until??10/01/21??and every 1??day(s)  ??? ? ? ?Hypertension Management-BMP not due until??11/13/21??and every 1??year(s)  ??? ? ? ?Diabetes Maintenance-Serum Creatinine not due until??11/13/21??and every 1??year(s)  ??? ? ? ?Diabetes Maintenance-HgbA1c not due until??12/01/21??and every 1??year(s)  ??? ? ? ?ADL Screening not due until??12/01/21??and every 1??year(s)  ??? ? ? ?Obesity  Screening not due until??01/01/22??and every 1??year(s)  ???Satisfied?(in the past 1 year)  ??? ??Satisfied?  ??? ? ? ?ADL Screening on??12/01/20.??Satisfied by Zahra Bernard  ??? ? ? ?Alcohol Misuse Screening on??06/09/20.??Satisfied by Katrina May  ??? ? ? ?Aspirin Therapy for CVD Prevention on??06/09/20.??Satisfied by Coco Mistry  ??? ? ? ?Blood Pressure Screening on??02/02/21.??Satisfied by Justin Palencia  ??? ? ? ?Body Mass Index Check on??02/02/21.??Satisfied by Justin Palencia  ??? ? ? ?Colorectal Screening on??06/17/20.??Satisfied by Katrina May  ??? ? ? ?Coronary Artery Disease Maintenance-Lipid Lowering Therapy on??12/14/20.??Satisfied by José Miguel No MD  ??? ? ? ?Depression Screening on??02/02/21.??Satisfied by Justin Palencia  ??? ? ? ?Diabetes Maintenance-HgbA1c on??12/01/20.??Satisfied by Zahra Bernard  ??? ? ? ?Diabetes Maintenance-Serum Creatinine on??11/13/20.??Satisfied by Celine Wong MT  ??? ? ? ?Diabetes Maintenance-Fasting Lipid Profile on??07/21/20.??Satisfied by [facility]  ??? ? ? ?Diabetes Maintenance-Microalbumin on??07/21/20.??Satisfied by [facility], [facility]  ??? ? ? ?Diabetes Screening on??11/13/20.??Satisfied by Celine Wong MT  ??? ? ? ?Hypertension Management-Blood Pressure on??02/02/21.??Satisfied by Justin Palencia  ??? ? ? ?Hypertension Management-BMP on??11/13/20.??Satisfied by Wong MT, Celine S.  ??? ? ? ?Hypertension Management-Education on??06/09/20.??Satisfied by Coco Mistry  ??? ? ? ?Influenza Vaccine on??02/02/21.??Satisfied by Justin Palencia  ??? ? ? ?Lipid Screening on??07/21/20.??Satisfied by [facility], [facility]  ??? ? ? ?Obesity Screening on??02/02/21.??Satisfied by Justin Palencia  ??? ? ? ?Smoking Cessation on??06/09/20.??Satisfied by Coco Mistry  ??? ??Refused?  ??? ? ?  ?Coronary Artery Disease Maintenance-Antiplatelet Agent Prescribed on??06/09/20.??Recorded by Coco Mistry  ??? ? ? ?Smoking Cessation on??02/02/21.??Recorded by Justin Palencia  ??? ? ? ?Tetanus Vaccine on??06/09/20.??Recorded by Coco Mistry  ?  Diagnostic Results  Left ankle 3 views:?Hardware intact. Alignment maintained.?Evidence of consolidation noted

## 2022-05-03 NOTE — HISTORICAL [facility]
This is a historical note converted from [place]. Formatting and pictures may have been removed.  Please reference [place] for original formatting and attached multimedia. Chief Complaint  5.5 week f/u orif left distal tibia fx, in boot, nwb  History of Present Illness  ?  Here today for follow-up evaluation status post?intra-medullary nailing?of left distal tibia fracture. He reports some pain in his heel. He is requesting a refill on his pain medication.?He has had?the development of a sore to the posterior aspect of his left heel?in his cam boot.  ?  Review of Systems  Otherwise no  Physical Exam  Vitals & Measurements  T:?36.9? ?C (Oral)? HR:?97(Peripheral)? RR:?20? BP:?126/70?  HT:?180.34?cm? WT:?99.730?kg? BMI:?30.66?  ?  Left lower extremity: All of his surgical incisions are well-healed.?He has?full-thickness?pressure sore to the posterior aspect of his left heel. No purulence?or drainage. No signs of infection.?He has no active use of his legs?due to his pre-existing paraplegia.?No painful or prominent hardware.  ?  Assessment/Plan  1.?Fracture of distal end of left tibia?S82.875D  Ordered:  Clinic Follow up, *Est. 02/02/21 3:00:00 CST, Order for future visit, Fracture of distal end of left tibia, Orthopaedics  Post Acute Provider, 12/22/20 10:14:00 CST, Michelle floers, nick and tx left heel pressure sore  Post-Op follow-up visit [id number] PC, Fracture of distal end of left tibia, Orthopaedics Clinic, 12/22/20 10:40:00 CST  XR Ankle Left Minimum 3 Views, Routine, *Est. 02/02/21 3:00:00 CST, Post-Op, None, Wheelchair, Rad Type, Order for future visit, Fracture of distal end of left tibia, Not Scheduled, *Est. 02/02/21 3:00:00 CST  ?  ?  He has had?pressure sores develop in the past and has been treating them at home and has home health, once a week as well. Well provide orders?for?treatment of his left heel pressure wound. He can discontinue the use of a cam boot and offload this area.?Ill have him follow up for  repeat x-rays of his left ankle?in 6 weeks. He has no evidence of an infection?in his hardware is intact.?He and his wife understand and agree with our plan today and all questions and concerns were addressed.  ?  Referrals  Clinic Follow up, *Est. 02/02/21 3:00:00 CST, Order for future visit, Fracture of distal end of left tibia, [facility]   Problem List/Past Medical History  Ongoing  Chronic pain  CRP elevated  Diabetes mellitus, type 2  Fracture of distal end of left tibia  Generalized anxiety disorder  GERD without esophagitis  Hypertension  Neurogenic bladder  Non-pressure chronic ulcer of right ankle with other specified severity  Other osteomyelitis, other site  Other specified anemias  Pressure ulcer of sacral region, stage 3  Pure hypercholesterolemia  Suprapubic catheter  Historical  ANXIETY  Arthritis  DIABETES  GERD - Gastro-esophageal reflux disease  HEPATITIS C  High cholesterol  HTN  LVH - Left ventricular hypertrophy  Reflux  SOB (shortness of breath)  TIA - Transient ischaemic attack  Procedure/Surgical History  Open treatment of fracture of weight bearing articular surface/portion of distal tibia (eg, pilon or tibial plafond), with internal fixation, when performed; of tibia only (11/13/2020)  ORIF Pilon (Left) (11/13/2020)  Reposition Left Tibia with Internal Fixation Device, Open Approach (11/13/2020)  Application of short leg splint (calf to foot) (11/03/2020)  Immobilization of Left Lower Leg using Splint (11/03/2020)  Debridement, subcutaneous tissue (includes epidermis and dermis, if performed); first 20 sq cm or less (03/09/2020)  Excision of Back Subcutaneous Tissue and Fascia, Open Approach (03/09/2020)  Excision of Right Foot Subcutaneous Tissue and Fascia, Open Approach (03/09/2020)  Debridement, subcutaneous tissue (includes epidermis and dermis, if performed); first 20 sq cm or less (03/02/2020)  Excision of Back Subcutaneous Tissue and Fascia, Open Approach (03/02/2020)  Excision  of Right Foot Subcutaneous Tissue and Fascia, Open Approach (03/02/2020)  Debridement, subcutaneous tissue (includes epidermis and dermis, if performed); first 20 sq cm or less (02/24/2020)  Excision of Back Subcutaneous Tissue and Fascia, Open Approach (02/24/2020)  Excision of Right Foot Subcutaneous Tissue and Fascia, Open Approach (02/24/2020)  Debridement, subcutaneous tissue (includes epidermis and dermis, if performed); first 20 sq cm or less (02/17/2020)  Excision of Back Subcutaneous Tissue and Fascia, Open Approach (02/17/2020)  Excision of Right Foot Subcutaneous Tissue and Fascia, Open Approach (02/17/2020)  Fluoroscopy of Aorta and Bilateral Lower Extremity Arteries using Low Osmolar Contrast (02/12/2020)  Selective catheter placement, arterial system; initial second order abdominal, pelvic, or lower extremity artery branch, within a vascular family (02/12/2020)  Debridement (eg, high pressure waterjet with/without suction, sharp selective debridement with scissors, scalpel and forceps), open wound, (eg, fibrin, devitalized epidermis and/or dermis, exudate, debris, biofilm), including topical application(s), wound (02/10/2020)  Debridement, subcutaneous tissue (includes epidermis and dermis, if performed); first 20 sq cm or less (02/10/2020)  Excision of Right Foot Subcutaneous Tissue and Fascia, Open Approach (02/10/2020)  Extraction of Back Skin, External Approach (02/10/2020)  Debridement, subcutaneous tissue (includes epidermis and dermis, if performed); first 20 sq cm or less (01/27/2020)  Excision of Right Foot Subcutaneous Tissue and Fascia, Open Approach (01/27/2020)  Debridement, subcutaneous tissue (includes epidermis and dermis, if performed); first 20 sq cm or less (01/20/2020)  Excision of Right Foot Subcutaneous Tissue and Fascia, Open Approach (01/20/2020)  Debridement, subcutaneous tissue (includes epidermis and dermis, if performed); first 20 sq cm or less (01/13/2020)  Excision of  Right Foot Subcutaneous Tissue and Fascia, Open Approach (01/13/2020)  Debridement, subcutaneous tissue (includes epidermis and dermis, if performed); first 20 sq cm or less (01/06/2020)  Excision of Right Foot Subcutaneous Tissue and Fascia, Open Approach (01/06/2020)  Debridement, subcutaneous tissue (includes epidermis and dermis, if performed); first 20 sq cm or less (12/30/2019)  Excision of Right Foot Subcutaneous Tissue and Fascia, Open Approach (12/30/2019)  Debridement, subcutaneous tissue (includes epidermis and dermis, if performed); first 20 sq cm or less (12/23/2019)  Excision of Right Foot Subcutaneous Tissue and Fascia, Open Approach (12/23/2019)  Debridement, subcutaneous tissue (includes epidermis and dermis, if performed); first 20 sq cm or less (12/09/2019)  Excision of Right Foot Subcutaneous Tissue and Fascia, Open Approach (12/09/2019)  Debridement, subcutaneous tissue (includes epidermis and dermis, if performed); first 20 sq cm or less (12/02/2019)  Excision of Right Foot Subcutaneous Tissue and Fascia, Open Approach (12/02/2019)  Debridement, subcutaneous tissue (includes epidermis and dermis, if performed); first 20 sq cm or less (11/25/2019)  Excision of Back Subcutaneous Tissue and Fascia, Open Approach (11/25/2019)  Excision of Right Foot Subcutaneous Tissue and Fascia, Open Approach (11/25/2019)  Debridement, subcutaneous tissue (includes epidermis and dermis, if performed); first 20 sq cm or less (11/18/2019)  Excision of Back Subcutaneous Tissue and Fascia, Open Approach (11/18/2019)  Excision of Right Foot Subcutaneous Tissue and Fascia, Open Approach (11/18/2019)  Debridement (eg, high pressure waterjet with/without suction, sharp selective debridement with scissors, scalpel and forceps), open wound, (eg, fibrin, devitalized epidermis and/or dermis, exudate, debris, biofilm), including topical application(s), wound (11/11/2019)  Debridement, subcutaneous tissue (includes epidermis  and dermis, if performed); first 20 sq cm or less (11/11/2019)  Excision of Back Subcutaneous Tissue and Fascia, Open Approach (11/11/2019)  Excision of Right Foot Subcutaneous Tissue and Fascia, Open Approach (11/11/2019)  Extraction of Abdomen Skin, External Approach (11/11/2019)  Debridement (eg, high pressure waterjet with/without suction, sharp selective debridement with scissors, scalpel and forceps), open wound, (eg, fibrin, devitalized epidermis and/or dermis, exudate, debris, biofilm), including topical application(s), wound (10/30/2019)  Debridement, subcutaneous tissue (includes epidermis and dermis, if performed); first 20 sq cm or less (10/30/2019)  Excision of Abdomen Subcutaneous Tissue and Fascia, Open Approach (10/30/2019)  Extraction of Back Skin, External Approach (10/30/2019)  Extraction of Right Lower Leg Skin, External Approach (10/30/2019)  Debridement, subcutaneous tissue (includes epidermis and dermis, if performed); first 20 sq cm or less (10/07/2019)  Excision of Back Subcutaneous Tissue and Fascia, Open Approach (10/07/2019)  Excision of Right Foot Subcutaneous Tissue and Fascia, Open Approach (10/07/2019)  Debridement, subcutaneous tissue (includes epidermis and dermis, if performed); first 20 sq cm or less (09/30/2019)  Excision of Back Subcutaneous Tissue and Fascia, Open Approach (09/30/2019)  Excision of Right Lower Leg Subcutaneous Tissue and Fascia, Open Approach (09/30/2019)  Debridement, subcutaneous tissue (includes epidermis and dermis, if performed); first 20 sq cm or less (09/23/2019)  Excision of Back Subcutaneous Tissue and Fascia, Open Approach (09/23/2019)  Excision of Right Foot Subcutaneous Tissue and Fascia, Open Approach (09/23/2019)  Debridement, subcutaneous tissue (includes epidermis and dermis, if performed); first 20 sq cm or less (09/16/2019)  Excision of Back Skin, External Approach (09/16/2019)  Excision of Right Foot Subcutaneous Tissue and Fascia, Open  Approach (09/16/2019)  Excision of Right Lower Leg Skin, External Approach (09/16/2019)  Debridement, subcutaneous tissue (includes epidermis and dermis, if performed); first 20 sq cm or less (09/09/2019)  Excision of Right Foot Subcutaneous Tissue and Fascia, Open Approach (09/09/2019)  Excision of Right Lower Leg Subcutaneous Tissue and Fascia, Open Approach (09/09/2019)  Drainage of Scrotum, Open Approach (09/03/2019)  Incision and drainage of epididymis, testis and/or scrotal space (eg, abscess or hematoma) (09/03/2019)  Debridement (eg, high pressure waterjet with/without suction, sharp selective debridement with scissors, scalpel and forceps), open wound, (eg, fibrin, devitalized epidermis and/or dermis, exudate, debris, biofilm), including topical application(s), wound (08/26/2019)  Extraction of Back Skin, External Approach (08/26/2019)  Extraction of Right Foot Skin, External Approach (08/26/2019)  Debridement, subcutaneous tissue (includes epidermis and dermis, if performed); first 20 sq cm or less (08/19/2019)  Excision of Back Subcutaneous Tissue and Fascia, Open Approach (08/19/2019)  Debridement, subcutaneous tissue (includes epidermis and dermis, if performed); first 20 sq cm or less (08/12/2019)  Excision of Back Subcutaneous Tissue and Fascia, Open Approach (08/12/2019)  Excision of Right Foot Subcutaneous Tissue and Fascia, Open Approach (08/12/2019)  Debridement, subcutaneous tissue (includes epidermis and dermis, if performed); first 20 sq cm or less (08/01/2019)  Excision of Back Subcutaneous Tissue and Fascia, Open Approach (08/01/2019)  Excision of Right Foot Subcutaneous Tissue and Fascia, Open Approach (08/01/2019)  Excision of Right Lower Leg Subcutaneous Tissue and Fascia, Open Approach (04/15/2019)  Insertion of Pacemaker Lead into Right Atrium, Percutaneous Approach (04/11/2019)  Insertion of Pacemaker Lead into Right Ventricle, Percutaneous Approach (04/11/2019)  Insertion of Pacemaker,  Dual Chamber into Chest Subcutaneous Tissue and Fascia, Open Approach (04/11/2019)  Debridement (eg, high pressure waterjet with/without suction, sharp selective debridement with scissors, scalpel and forceps), open wound, (eg, fibrin, devitalized epidermis and/or dermis, exudate, debris, biofilm), including topical application(s), wound (04/08/2019)  Debridement, subcutaneous tissue (includes epidermis and dermis, if performed); first 20 sq cm or less (04/08/2019)  Excision of Right Foot Subcutaneous Tissue and Fascia, Open Approach (04/08/2019)  Extraction of Back Skin, External Approach (04/08/2019)  Debridement, subcutaneous tissue (includes epidermis and dermis, if performed); first 20 sq cm or less (03/25/2019)  Excision of Back Subcutaneous Tissue and Fascia, Open Approach (03/25/2019)  Excision of Right Foot Subcutaneous Tissue and Fascia, Open Approach (03/25/2019)  Debridement, subcutaneous tissue (includes epidermis and dermis, if performed); each additional 20 sq cm, or part thereof (List separately in addition to code for primary procedure) (03/18/2019)  Debridement, subcutaneous tissue (includes epidermis and dermis, if performed); first 20 sq cm or less (03/18/2019)  Excision of Back Subcutaneous Tissue and Fascia, Open Approach (03/18/2019)  Debridement, subcutaneous tissue (includes epidermis and dermis, if performed); each additional 20 sq cm, or part thereof (List separately in addition to code for primary procedure) (03/11/2019)  Debridement, subcutaneous tissue (includes epidermis and dermis, if performed); first 20 sq cm or less (03/11/2019)  Excision of Back Subcutaneous Tissue and Fascia, Open Approach (03/11/2019)  Debridement, subcutaneous tissue (includes epidermis and dermis, if performed); each additional 20 sq cm, or part thereof (List separately in addition to code for primary procedure) (02/25/2019)  Debridement, subcutaneous tissue (includes epidermis and dermis, if performed); first  20 sq cm or less (02/25/2019)  Excision of Back Subcutaneous Tissue and Fascia, Open Approach (02/25/2019)  Debridement, subcutaneous tissue (includes epidermis and dermis, if performed); each additional 20 sq cm, or part thereof (List separately in addition to code for primary procedure) (02/18/2019)  Debridement, subcutaneous tissue (includes epidermis and dermis, if performed); first 20 sq cm or less (02/18/2019)  Excision of Back Subcutaneous Tissue and Fascia, Open Approach (02/18/2019)  Debridement (eg, high pressure waterjet with/without suction, sharp selective debridement with scissors, scalpel and forceps), open wound, (eg, fibrin, devitalized epidermis and/or dermis, exudate, debris, biofilm), including topical application(s), wound (02/11/2019)  Debridement (eg, high pressure waterjet with/without suction, sharp selective debridement with scissors, scalpel and forceps), open wound, (eg, fibrin, devitalized epidermis and/or dermis, exudate, debris, biofilm), including topical application(s), wound (02/11/2019)  Extraction of Back Skin, External Approach (02/11/2019)  Debridement, muscle and/or fascia (includes epidermis, dermis, and subcutaneous tissue, if performed); each additional 20 sq cm, or part thereof (List separately in addition to code for primary procedure) (01/14/2019)  Debridement, muscle and/or fascia (includes epidermis, dermis, and subcutaneous tissue, if performed); each additional 20 sq cm, or part thereof (List separately in addition to code for primary procedure) (01/14/2019)  Debridement, muscle and/or fascia (includes epidermis, dermis, and subcutaneous tissue, if performed); first 20 sq cm or less (01/14/2019)  Debridement, subcutaneous tissue (includes epidermis and dermis, if performed); first 20 sq cm or less (01/14/2019)  Excision of Buttock Subcutaneous Tissue and Fascia, Open Approach (01/14/2019)  Excision of Left Hip Muscle, Open Approach (01/14/2019)  Excision of Right Hip  Muscle, Open Approach (01/14/2019)  Debridement, muscle and/or fascia (includes epidermis, dermis, and subcutaneous tissue, if performed); each additional 20 sq cm, or part thereof (List separately in addition to code for primary procedure) (01/07/2019)  Debridement, muscle and/or fascia (includes epidermis, dermis, and subcutaneous tissue, if performed); each additional 20 sq cm, or part thereof (List separately in addition to code for primary procedure) (01/07/2019)  Debridement, muscle and/or fascia (includes epidermis, dermis, and subcutaneous tissue, if performed); first 20 sq cm or less (01/07/2019)  Debridement, subcutaneous tissue (includes epidermis and dermis, if performed); first 20 sq cm or less (01/07/2019)  Excision of Buttock Subcutaneous Tissue and Fascia, Open Approach (01/07/2019)  Excision of Left Hip Muscle, Open Approach (01/07/2019)  Excision of Right Hip Muscle, Open Approach (01/07/2019)  Debridement, subcutaneous tissue (includes epidermis and dermis, if performed); each additional 20 sq cm, or part thereof (List separately in addition to code for primary procedure) (12/27/2018)  Debridement, subcutaneous tissue (includes epidermis and dermis, if performed); each additional 20 sq cm, or part thereof (List separately in addition to code for primary procedure) (12/27/2018)  Debridement, subcutaneous tissue (includes epidermis and dermis, if performed); first 20 sq cm or less (12/27/2018)  Excision of Back Subcutaneous Tissue and Fascia, Open Approach (12/27/2018)  Debridement, subcutaneous tissue (includes epidermis and dermis, if performed); each additional 20 sq cm, or part thereof (List separately in addition to code for primary procedure) (12/17/2018)  Debridement, subcutaneous tissue (includes epidermis and dermis, if performed); first 20 sq cm or less (12/17/2018)  Excision of Back Subcutaneous Tissue and Fascia, Open Approach (12/17/2018)  Debridement, subcutaneous tissue (includes  epidermis and dermis, if performed); each additional 20 sq cm, or part thereof (List separately in addition to code for primary procedure) (12/10/2018)  Debridement, subcutaneous tissue (includes epidermis and dermis, if performed); first 20 sq cm or less (12/10/2018)  Excision of Back Subcutaneous Tissue and Fascia, Open Approach (12/10/2018)  Debridement, subcutaneous tissue (includes epidermis and dermis, if performed); first 20 sq cm or less (12/03/2018)  Excision of Back Subcutaneous Tissue and Fascia, Open Approach (12/03/2018)  Debridement, subcutaneous tissue (includes epidermis and dermis, if performed); each additional 20 sq cm, or part thereof (List separately in addition to code for primary procedure) (11/26/2018)  Debridement, subcutaneous tissue (includes epidermis and dermis, if performed); first 20 sq cm or less (11/26/2018)  Excision of Back Subcutaneous Tissue and Fascia, Open Approach (11/26/2018)  Debridement, subcutaneous tissue (includes epidermis and dermis, if performed); each additional 20 sq cm, or part thereof (List separately in addition to code for primary procedure) (11/19/2018)  Debridement, subcutaneous tissue (includes epidermis and dermis, if performed); each additional 20 sq cm, or part thereof (List separately in addition to code for primary procedure) (11/19/2018)  Debridement, subcutaneous tissue (includes epidermis and dermis, if performed); first 20 sq cm or less (11/19/2018)  Excision of Back Subcutaneous Tissue and Fascia, Open Approach (11/19/2018)  Debridement, subcutaneous tissue (includes epidermis and dermis, if performed); each additional 20 sq cm, or part thereof (List separately in addition to code for primary procedure) (11/12/2018)  Debridement, subcutaneous tissue (includes epidermis and dermis, if performed); each additional 20 sq cm, or part thereof (List separately in addition to code for primary procedure) (11/12/2018)  Debridement, subcutaneous tissue  (includes epidermis and dermis, if performed); first 20 sq cm or less (11/12/2018)  Excision of Back Subcutaneous Tissue and Fascia, Open Approach (11/12/2018)  Debridement, subcutaneous tissue (includes epidermis and dermis, if performed); each additional 20 sq cm, or part thereof (List separately in addition to code for primary procedure) (11/05/2018)  Debridement, subcutaneous tissue (includes epidermis and dermis, if performed); each additional 20 sq cm, or part thereof (List separately in addition to code for primary procedure) (11/05/2018)  Debridement, subcutaneous tissue (includes epidermis and dermis, if performed); each additional 20 sq cm, or part thereof (List separately in addition to code for primary procedure) (11/05/2018)  Debridement, subcutaneous tissue (includes epidermis and dermis, if performed); first 20 sq cm or less (11/05/2018)  Excision of Back Subcutaneous Tissue and Fascia, Open Approach (11/05/2018)  Debridement, subcutaneous tissue (includes epidermis and dermis, if performed); each additional 20 sq cm, or part thereof (List separately in addition to code for primary procedure) (10/22/2018)  Debridement, subcutaneous tissue (includes epidermis and dermis, if performed); each additional 20 sq cm, or part thereof (List separately in addition to code for primary procedure) (10/22/2018)  Debridement, subcutaneous tissue (includes epidermis and dermis, if performed); first 20 sq cm or less (10/22/2018)  Excision of Back Subcutaneous Tissue and Fascia, Open Approach (10/22/2018)  Debridement, subcutaneous tissue (includes epidermis and dermis, if performed); each additional 20 sq cm, or part thereof (List separately in addition to code for primary procedure) (10/16/2018)  Debridement, subcutaneous tissue (includes epidermis and dermis, if performed); each additional 20 sq cm, or part thereof (List separately in addition to code for primary procedure) (10/16/2018)  Debridement, subcutaneous  tissue (includes epidermis and dermis, if performed); each additional 20 sq cm, or part thereof (List separately in addition to code for primary procedure) (10/16/2018)  Debridement, subcutaneous tissue (includes epidermis and dermis, if performed); first 20 sq cm or less (10/16/2018)  Excision of Back Subcutaneous Tissue and Fascia, Open Approach (10/16/2018)  Debridement, subcutaneous tissue (includes epidermis and dermis, if performed); each additional 20 sq cm, or part thereof (List separately in addition to code for primary procedure) (10/04/2018)  Debridement, subcutaneous tissue (includes epidermis and dermis, if performed); each additional 20 sq cm, or part thereof (List separately in addition to code for primary procedure) (10/04/2018)  Debridement, subcutaneous tissue (includes epidermis and dermis, if performed); each additional 20 sq cm, or part thereof (List separately in addition to code for primary procedure) (10/04/2018)  Debridement, subcutaneous tissue (includes epidermis and dermis, if performed); first 20 sq cm or less (10/04/2018)  Excision of Back Subcutaneous Tissue and Fascia, Open Approach (10/04/2018)  Debridement, subcutaneous tissue (includes epidermis and dermis, if performed); each additional 20 sq cm, or part thereof (List separately in addition to code for primary procedure) (09/25/2018)  Debridement, subcutaneous tissue (includes epidermis and dermis, if performed); each additional 20 sq cm, or part thereof (List separately in addition to code for primary procedure) (09/25/2018)  Debridement, subcutaneous tissue (includes epidermis and dermis, if performed); each additional 20 sq cm, or part thereof (List separately in addition to code for primary procedure) (09/25/2018)  Debridement, subcutaneous tissue (includes epidermis and dermis, if performed); each additional 20 sq cm, or part thereof (List separately in addition to code for primary procedure) (09/25/2018)  Debridement,  subcutaneous tissue (includes epidermis and dermis, if performed); first 20 sq cm or less (09/25/2018)  Excision of Back Subcutaneous Tissue and Fascia, Open Approach (09/25/2018)  Debridement, muscle and/or fascia (includes epidermis, dermis, and subcutaneous tissue, if performed); each additional 20 sq cm, or part thereof (List separately in addition to code for primary procedure) (09/17/2018)  Debridement, muscle and/or fascia (includes epidermis, dermis, and subcutaneous tissue, if performed); each additional 20 sq cm, or part thereof (List separately in addition to code for primary procedure) (09/17/2018)  Debridement, muscle and/or fascia (includes epidermis, dermis, and subcutaneous tissue, if performed); first 20 sq cm or less (09/17/2018)  Excision of Left Hip Muscle, Open Approach (09/17/2018)  Excision of Right Hip Muscle, Open Approach (09/17/2018)  [Endoscopic] polypectomy of rectum (06/18/2013)  Colonoscopy, flexible, proximal to splenic flexure; with biopsy, single or multiple. (06/18/2013)  Back Surgery  Colonoscopy  NECK SURGERY  removed tracheostomy  RIGHT LEG SURGERY  Tracheostomy   Medications  acetaminophen-hydrocodone 325 mg-7.5 mg oral tablet, 1 tab(s), Oral, q4hr  acetaminophen-oxycodone 325 mg-10 mg oral tablet, 1 tab(s), Oral, q8hr,? ?Not Taking, Completed Rx  amitriptyline 50 mg oral tablet, 50 mg= 1 tab(s), Oral, qPM  amlodipine 10 mg oral tablet, 10 mg= 1 tab(s), Oral, Daily  amoxicillin-clavulanate 875 mg-125 mg oral tablet, 875 mg, Oral, q12hr,? ?Not Taking, Completed Rx  atorvastatin 10 mg oral tablet, 5 mg= 0.5 tab(s), Oral, With Supper, 2 refills  BiDil 20 mg-37.5 mg oral tablet, 1 tab(s), Oral, TID  carvedilol 12.5 mg oral tablet, 12.5 mg= 1 tab(s), Oral, BID  ciprofloxacin 500 mg oral tablet, 500 mg= 1 tab(s), Oral, q12hr,? ?Not Taking, Completed Rx  cyclobenzaprine 10 mg oral tablet, 10 mg= 1 tab(s), Oral, BID, 1 refills  Eliquis Starter Pack for Treatment of DVT and PE 5 mg oral  tablet, 5 mg= 1 tab(s), Oral, BID  ergocalciferol 50,000 intl units (1.25 mg) oral capsule, Oral, Daily  fenofibrate 160 mg oral tablet, 160 mg= 1 tab(s), Oral, Daily  Fiber Tabs, 1 tab(s), Oral, Daily  Flomax 0.4 mg oral capsule, 0.4 mg= 1 cap(s), Oral, Daily,? ?Not taking  fluoxetine 20 mg oral capsule, 20 mg= 1 cap(s), Oral, Daily  FLUoxetine 40 mg oral capsule, 40 mg= 1 cap(s), Oral, Daily  gabapentin 300 mg oral capsule, 600 mg= 2 cap(s), Oral, TID, 3 refills  Januvia 100 mg oral tablet, 100 mg= 1 tab(s), Oral, Daily  ketoconazole 2% topical shampoo, TOP, Once  lisinopril 40 mg oral tablet, See Instructions  meloxicam 15 mg oral tablet, 15 mg= 1 tab(s), Oral, Daily  metFORMIN 1000 mg oral tablet, 1000 mg= 1 tab(s), Oral, BID  metformin 500 mg oral tablet, 500 mg= 1 tab(s), Oral, BID  nitrofurantoin macrocrystals-monohydrate 100 mg oral capsule, 100 mg= 1 cap(s), Oral, BID,? ?Not Taking, Completed Rx  Pantoprazole 40 mg ORAL EC-Tablet, 40 mg= 1 tab(s), Oral, Daily  Percocet 7.5 mg-325 mg oral tablet, 1 tab(s), Oral, q6hr, PRN  tiZANidine 4 mg oral tablet, 4 mg= 1 tab(s), Oral, TID  Toradol 10 mg oral tablet, 10 mg= 1 tab(s), Oral, QID  traZODone 100 mg oral tablet, 100 mg= 1 tab(s), Oral, Once a day (at bedtime), 5 refills,? ?Not taking: Last Dose Date/Time Unknown  Vitamin D3 50,000 intl units (1250 mcg) oral capsule, 68328 IntUnit= 1 cap(s), Oral, qWeek  Allergies  baclofen?(Panic attack, Itching)  Social History  Abuse/Neglect  No, 12/01/2020  No, No, 11/25/2020  Alcohol  Current, Beer, 1-2 times per week, 09/27/2012  Substance Use  Past, Marijuana, 04/11/2019  Tobacco  Former smoker, quit more than 30 days ago, N/A, 12/01/2020  Former smoker, quit more than 30 days ago, No, 11/25/2020  Family History  Family history is negative  Immunizations  Vaccine Date Status   influenza virus vaccine, inactivated 12/01/2020 Given   influenza virus vaccine, inactivated 09/16/2020 Given   Health Maintenance  Health  Maintenance  ???Pending?(in the next year)  ??? ??Due?  ??? ? ? ?Diabetes Maintenance-Eye Exam due??12/22/20??Unknown Frequency  ??? ? ? ?Diabetes Maintenance-Foot Exam due??12/22/20??Unknown Frequency  ??? ? ? ?Lung Cancer Screening due??12/22/20??and every 1??year(s)  ??? ? ? ?Medicare Annual Wellness Exam due??12/22/20??and every 1??year(s)  ??? ? ? ?Zoster Vaccine due??12/22/20??Unknown Frequency  ??? ??Refused?  ??? ? ? ?Tetanus Vaccine due??12/22/20??and every 10??year(s)  ??? ??Due In Future?  ??? ? ? ?Obesity Screening not due until??01/01/21??and every 1??year(s)  ??? ? ? ?Smoking Cessation not due until??01/01/21??and every 1??year(s)  ??? ? ? ?Alcohol Misuse Screening not due until??01/02/21??and every 1??year(s)  ??? ? ? ?Aspirin Therapy for CVD Prevention not due until??06/09/21??and every 1??year(s)  ??? ? ? ?Hypertension Management-Education not due until??06/09/21??and every 1??year(s)  ??? ? ? ?Diabetes Maintenance-Fasting Lipid Profile not due until??07/21/21??and every 1??year(s)  ??? ? ? ?Influenza Vaccine not due until??10/01/21??and every 1??day(s)  ??? ? ? ?Hypertension Management-BMP not due until??11/13/21??and every 1??year(s)  ??? ? ? ?Diabetes Maintenance-Serum Creatinine not due until??11/13/21??and every 1??year(s)  ??? ? ? ?Diabetes Maintenance-HgbA1c not due until??12/01/21??and every 1??year(s)  ??? ? ? ?ADL Screening not due until??12/01/21??and every 1??year(s)  ???Satisfied?(in the past 1 year)  ??? ??Satisfied?  ??? ? ? ?ADL Screening on??12/01/20.??Satisfied by Zahra Bernard  ??? ? ? ?Alcohol Misuse Screening on??06/09/20.??Satisfied by Katrina May  ??? ? ? ?Aspirin Therapy for CVD Prevention on??06/09/20.??Satisfied by Coco Mistry  ??? ? ? ?Blood Pressure Screening on??12/22/20.??Satisfied by Reina Doan  ??? ? ? ?Body Mass Index Check on??12/22/20.??Satisfied by Reina Doan  ??? ? ? ?Colorectal Screening on??06/17/20.??Satisfied by  Katrina May  ??? ? ? ?Coronary Artery Disease Maintenance-Lipid Lowering Therapy on??12/14/20.??Satisfied by José Miguel No MD  ??? ? ? ?Depression Screening on??12/22/20.??Satisfied by Reina Doan.  ??? ? ? ?Diabetes Maintenance-HgbA1c on??12/01/20.??Satisfied by Zahra Bernard  ??? ? ? ?Diabetes Maintenance-Serum Creatinine on??11/13/20.??Satisfied by Celine Wong MT  ??? ? ? ?Diabetes Maintenance-Fasting Lipid Profile on??07/21/20.??Satisfied by [facility], [facility]  ??? ? ? ?Diabetes Maintenance-Microalbumin on??07/21/20.??Satisfied by [facility], [facility]  ??? ? ? ?Diabetes Screening on??11/13/20.??Satisfied by Celine Wong MT  ??? ? ? ?Hypertension Management-Blood Pressure on??12/22/20.??Satisfied by Reina Doan.  ??? ? ? ?Hypertension Management-BMP on??11/13/20.??Satisfied by Celine Wong MT  ??? ? ? ?Hypertension Management-Education on??06/09/20.??Satisfied by Mika KRAFT, Coco D  ??? ? ? ?Influenza Vaccine on??12/01/20.??Satisfied by Zahra Bernard  ??? ? ? ?Lipid Screening on??07/21/20.??Satisfied by [facility], [facility]  ??? ? ? ?Obesity Screening on??12/22/20.??Satisfied by Reina Doan  ??? ? ? ?Smoking Cessation on??06/09/20.??Satisfied by Mika CALDWELLP, Coco D  ??? ??Refused?  ??? ? ? ?Coronary Artery Disease Maintenance-Antiplatelet Agent Prescribed on??06/09/20.??Recorded by Mika KRAFT, Coco D  ??? ? ? ?Tetanus Vaccine on??06/09/20.??Recorded by Coco Mistry  ?  Diagnostic Results  Left?ankle 3 views:?Hardware intact. Alignment maintained.

## 2022-05-26 PROBLEM — F41.1 GENERALIZED ANXIETY DISORDER: Chronic | Status: ACTIVE | Noted: 2022-05-26

## 2022-05-26 PROBLEM — J90 PLEURAL EFFUSION: Status: ACTIVE | Noted: 2018-04-23

## 2022-05-26 PROBLEM — J98.11 ATELECTASIS: Status: ACTIVE | Noted: 2018-04-23

## 2022-05-26 PROBLEM — Z93.59 PRESENCE OF SUPRAPUBIC CATHETER: Status: ACTIVE | Noted: 2022-05-26

## 2022-05-26 PROBLEM — R13.10 DYSPHAGIA: Status: ACTIVE | Noted: 2018-04-20

## 2022-05-26 PROBLEM — S82.309A FRACTURE OF DISTAL END OF TIBIA: Status: ACTIVE | Noted: 2022-05-26

## 2022-05-26 PROBLEM — R79.82 HIGH C-REACTIVE PROTEIN: Chronic | Status: ACTIVE | Noted: 2022-05-26

## 2022-05-26 PROBLEM — M86.9 OSTEOMYELITIS: Chronic | Status: ACTIVE | Noted: 2022-05-26

## 2022-05-26 PROBLEM — J90 PLEURAL EFFUSION: Chronic | Status: RESOLVED | Noted: 2018-04-23 | Resolved: 2022-05-26

## 2022-05-26 PROBLEM — G89.29 CHRONIC PAIN: Chronic | Status: ACTIVE | Noted: 2019-08-09

## 2022-05-26 PROBLEM — J98.11 ATELECTASIS: Chronic | Status: RESOLVED | Noted: 2018-04-23 | Resolved: 2022-05-26

## 2022-05-26 PROBLEM — N31.9 NEUROGENIC BLADDER: Status: ACTIVE | Noted: 2022-05-26

## 2022-05-26 PROBLEM — E78.00 PURE HYPERCHOLESTEROLEMIA: Chronic | Status: ACTIVE | Noted: 2022-05-26

## 2022-05-26 PROBLEM — G89.29 CHRONIC PAIN: Status: ACTIVE | Noted: 2019-08-09

## 2022-05-26 PROBLEM — E11.9 TYPE 2 DIABETES MELLITUS: Status: ACTIVE | Noted: 2019-08-09

## 2022-05-26 PROBLEM — S24.101A SPINAL CORD INJURY AT T1-T6 LEVEL: Chronic | Status: ACTIVE | Noted: 2018-04-20

## 2022-05-26 PROBLEM — M86.9 OSTEOMYELITIS: Status: ACTIVE | Noted: 2022-05-26

## 2022-05-26 PROBLEM — D64.9 ANEMIA: Status: ACTIVE | Noted: 2022-05-26

## 2022-05-26 PROBLEM — Z93.59 PRESENCE OF SUPRAPUBIC CATHETER: Chronic | Status: ACTIVE | Noted: 2022-05-26

## 2022-05-26 PROBLEM — I10 HYPERTENSION: Chronic | Status: ACTIVE | Noted: 2019-08-09

## 2022-05-26 PROBLEM — K21.9 GASTROESOPHAGEAL REFLUX DISEASE WITHOUT ESOPHAGITIS: Status: ACTIVE | Noted: 2022-05-26

## 2022-05-26 PROBLEM — R79.82 HIGH C-REACTIVE PROTEIN: Status: ACTIVE | Noted: 2022-05-26

## 2022-05-26 PROBLEM — L97.309: Chronic | Status: ACTIVE | Noted: 2022-05-26

## 2022-05-26 PROBLEM — E11.9 TYPE 2 DIABETES MELLITUS: Chronic | Status: ACTIVE | Noted: 2019-08-09

## 2022-05-26 PROBLEM — R33.9 RETENTION OF URINE, UNSPECIFIED: Status: ACTIVE | Noted: 2019-08-09

## 2022-05-26 PROBLEM — S24.101A SPINAL CORD INJURY AT T1-T6 LEVEL: Status: ACTIVE | Noted: 2018-04-20

## 2022-05-26 PROBLEM — N39.0 FREQUENT UTI: Chronic | Status: ACTIVE | Noted: 2019-07-02

## 2022-05-26 PROBLEM — N31.9 NEUROGENIC BLADDER: Chronic | Status: ACTIVE | Noted: 2022-05-26

## 2022-05-26 PROBLEM — J90 PLEURAL EFFUSION: Chronic | Status: ACTIVE | Noted: 2018-04-23

## 2022-05-26 PROBLEM — R13.10 DYSPHAGIA: Chronic | Status: ACTIVE | Noted: 2018-04-20

## 2022-05-26 PROBLEM — J98.11 ATELECTASIS: Chronic | Status: ACTIVE | Noted: 2018-04-23

## 2022-05-26 PROBLEM — L97.309: Status: ACTIVE | Noted: 2022-05-26

## 2022-05-26 PROBLEM — S82.309A FRACTURE OF DISTAL END OF TIBIA: Chronic | Status: ACTIVE | Noted: 2022-05-26

## 2022-05-26 PROBLEM — E78.00 PURE HYPERCHOLESTEROLEMIA: Status: ACTIVE | Noted: 2022-05-26

## 2022-05-26 PROBLEM — I10 HYPERTENSION: Status: ACTIVE | Noted: 2019-08-09

## 2022-05-26 PROBLEM — K21.9 GASTROESOPHAGEAL REFLUX DISEASE WITHOUT ESOPHAGITIS: Chronic | Status: ACTIVE | Noted: 2022-05-26

## 2022-05-26 PROBLEM — R79.82 HIGH C-REACTIVE PROTEIN: Chronic | Status: RESOLVED | Noted: 2022-05-26 | Resolved: 2022-05-26

## 2022-05-26 PROBLEM — R33.9 RETENTION OF URINE, UNSPECIFIED: Chronic | Status: RESOLVED | Noted: 2019-08-09 | Resolved: 2022-05-26

## 2022-05-26 PROBLEM — D64.9 ANEMIA: Chronic | Status: ACTIVE | Noted: 2022-05-26

## 2022-05-26 PROBLEM — F41.1 GENERALIZED ANXIETY DISORDER: Status: ACTIVE | Noted: 2022-05-26

## 2022-05-26 PROBLEM — R33.9 RETENTION OF URINE, UNSPECIFIED: Chronic | Status: ACTIVE | Noted: 2019-08-09

## 2022-05-26 PROBLEM — N39.0 FREQUENT UTI: Status: ACTIVE | Noted: 2019-07-02

## 2022-08-09 ENCOUNTER — HOSPITAL ENCOUNTER (INPATIENT)
Facility: HOSPITAL | Age: 57
LOS: 3 days | Discharge: HOME HEALTH | DRG: 493 | End: 2022-08-12
Attending: EMERGENCY MEDICINE | Admitting: STUDENT IN AN ORGANIZED HEALTH CARE EDUCATION/TRAINING PROGRAM
Payer: MEDICARE

## 2022-08-09 DIAGNOSIS — M79.661 PAIN AND SWELLING OF RIGHT LOWER LEG: ICD-10-CM

## 2022-08-09 DIAGNOSIS — M79.89 PAIN AND SWELLING OF RIGHT LOWER LEG: ICD-10-CM

## 2022-08-09 DIAGNOSIS — S82.241A CLOSED DISPLACED SPIRAL FRACTURE OF SHAFT OF RIGHT TIBIA, INITIAL ENCOUNTER: ICD-10-CM

## 2022-08-09 DIAGNOSIS — M79.604 RIGHT LEG PAIN: ICD-10-CM

## 2022-08-09 DIAGNOSIS — S82.831A OTHER CLOSED FRACTURE OF PROXIMAL END OF RIGHT FIBULA, INITIAL ENCOUNTER: Primary | ICD-10-CM

## 2022-08-09 DIAGNOSIS — S82.871A CLOSED DISPLACED PILON FRACTURE OF RIGHT TIBIA, INITIAL ENCOUNTER: ICD-10-CM

## 2022-08-09 DIAGNOSIS — Z01.818 PRE-OP EXAM: ICD-10-CM

## 2022-08-09 LAB
ALBUMIN SERPL-MCNC: 3.3 GM/DL (ref 3.5–5)
ALBUMIN/GLOB SERPL: 0.7 RATIO (ref 1.1–2)
ALP SERPL-CCNC: 112 UNIT/L (ref 40–150)
ALT SERPL-CCNC: 8 UNIT/L (ref 0–55)
APTT PPP: 37.4 SECONDS (ref 23.2–33.7)
AST SERPL-CCNC: 16 UNIT/L (ref 5–34)
BASOPHILS # BLD AUTO: 0.02 X10(3)/MCL (ref 0–0.2)
BASOPHILS NFR BLD AUTO: 0.3 %
BILIRUBIN DIRECT+TOT PNL SERPL-MCNC: 0.5 MG/DL
BUN SERPL-MCNC: 5.6 MG/DL (ref 8.4–25.7)
CALCIUM SERPL-MCNC: 9.3 MG/DL (ref 8.4–10.2)
CHLORIDE SERPL-SCNC: 104 MMOL/L (ref 98–107)
CO2 SERPL-SCNC: 24 MMOL/L (ref 22–29)
CREAT SERPL-MCNC: 0.67 MG/DL (ref 0.73–1.18)
EOSINOPHIL # BLD AUTO: 0.08 X10(3)/MCL (ref 0–0.9)
EOSINOPHIL NFR BLD AUTO: 1.1 %
ERYTHROCYTE [DISTWIDTH] IN BLOOD BY AUTOMATED COUNT: 15.1 % (ref 11.5–17)
GFR SERPLBLD CREATININE-BSD FMLA CKD-EPI: >60 MLS/MIN/1.73/M2
GLOBULIN SER-MCNC: 4.9 GM/DL (ref 2.4–3.5)
GLUCOSE SERPL-MCNC: 91 MG/DL (ref 74–100)
HCT VFR BLD AUTO: 41.7 % (ref 42–52)
HGB BLD-MCNC: 12.8 GM/DL (ref 14–18)
IMM GRANULOCYTES # BLD AUTO: 0.03 X10(3)/MCL (ref 0–0.04)
IMM GRANULOCYTES NFR BLD AUTO: 0.4 %
INR BLD: 1.12 (ref 0–1.3)
LYMPHOCYTES # BLD AUTO: 2.9 X10(3)/MCL (ref 0.6–4.6)
LYMPHOCYTES NFR BLD AUTO: 40.6 %
MCH RBC QN AUTO: 28 PG (ref 27–31)
MCHC RBC AUTO-ENTMCNC: 30.7 MG/DL (ref 33–36)
MCV RBC AUTO: 91.2 FL (ref 80–94)
MONOCYTES # BLD AUTO: 0.55 X10(3)/MCL (ref 0.1–1.3)
MONOCYTES NFR BLD AUTO: 7.7 %
NEUTROPHILS # BLD AUTO: 3.6 X10(3)/MCL (ref 2.1–9.2)
NEUTROPHILS NFR BLD AUTO: 49.9 %
NRBC BLD AUTO-RTO: 0 %
PLATELET # BLD AUTO: 363 X10(3)/MCL (ref 130–400)
PMV BLD AUTO: 9.8 FL (ref 7.4–10.4)
POTASSIUM SERPL-SCNC: 4 MMOL/L (ref 3.5–5.1)
PROT SERPL-MCNC: 8.2 GM/DL (ref 6.4–8.3)
PROTHROMBIN TIME: 14.3 SECONDS (ref 12.5–14.5)
RBC # BLD AUTO: 4.57 X10(6)/MCL (ref 4.7–6.1)
SODIUM SERPL-SCNC: 137 MMOL/L (ref 136–145)
WBC # SPEC AUTO: 7.1 X10(3)/MCL (ref 4.5–11.5)

## 2022-08-09 PROCEDURE — 63600175 PHARM REV CODE 636 W HCPCS: Performed by: NURSE PRACTITIONER

## 2022-08-09 PROCEDURE — 85730 THROMBOPLASTIN TIME PARTIAL: CPT | Performed by: PHYSICIAN ASSISTANT

## 2022-08-09 PROCEDURE — 36415 COLL VENOUS BLD VENIPUNCTURE: CPT | Performed by: PHYSICIAN ASSISTANT

## 2022-08-09 PROCEDURE — 99285 EMERGENCY DEPT VISIT HI MDM: CPT | Mod: 25

## 2022-08-09 PROCEDURE — 82040 ASSAY OF SERUM ALBUMIN: CPT | Performed by: PHYSICIAN ASSISTANT

## 2022-08-09 PROCEDURE — 25000003 PHARM REV CODE 250: Performed by: NURSE PRACTITIONER

## 2022-08-09 PROCEDURE — 85025 COMPLETE CBC W/AUTO DIFF WBC: CPT | Performed by: PHYSICIAN ASSISTANT

## 2022-08-09 PROCEDURE — 80053 COMPREHEN METABOLIC PANEL: CPT | Performed by: PHYSICIAN ASSISTANT

## 2022-08-09 PROCEDURE — 11000001 HC ACUTE MED/SURG PRIVATE ROOM

## 2022-08-09 PROCEDURE — 85610 PROTHROMBIN TIME: CPT | Performed by: PHYSICIAN ASSISTANT

## 2022-08-09 RX ORDER — ISOSORBIDE MONONITRATE 20 MG/1
40 TABLET ORAL NIGHTLY
COMMUNITY
Start: 2022-06-07 | End: 2023-06-28

## 2022-08-09 RX ORDER — GABAPENTIN 300 MG/1
300 CAPSULE ORAL 3 TIMES DAILY
Status: DISCONTINUED | OUTPATIENT
Start: 2022-08-10 | End: 2022-08-12 | Stop reason: HOSPADM

## 2022-08-09 RX ORDER — OXYBUTYNIN CHLORIDE 5 MG/1
5 TABLET ORAL 2 TIMES DAILY
Status: ON HOLD | COMMUNITY
Start: 2022-08-01 | End: 2023-08-09 | Stop reason: HOSPADM

## 2022-08-09 RX ORDER — BUSPIRONE HYDROCHLORIDE 5 MG/1
5 TABLET ORAL 2 TIMES DAILY
Status: DISCONTINUED | OUTPATIENT
Start: 2022-08-09 | End: 2022-08-12 | Stop reason: HOSPADM

## 2022-08-09 RX ORDER — OXYCODONE AND ACETAMINOPHEN 10; 325 MG/1; MG/1
1 TABLET ORAL EVERY 6 HOURS PRN
Status: ON HOLD | COMMUNITY
Start: 2022-08-04 | End: 2023-10-03 | Stop reason: HOSPADM

## 2022-08-09 RX ORDER — FLUOXETINE 10 MG/1
CAPSULE ORAL
COMMUNITY
End: 2022-09-13

## 2022-08-09 RX ORDER — HYDROCODONE BITARTRATE AND ACETAMINOPHEN 5; 325 MG/1; MG/1
1 TABLET ORAL
Status: DISPENSED | OUTPATIENT
Start: 2022-08-09 | End: 2022-08-10

## 2022-08-09 RX ORDER — MELOXICAM 15 MG/1
15 TABLET ORAL DAILY
Status: ON HOLD | COMMUNITY
Start: 2022-08-01 | End: 2023-08-09 | Stop reason: HOSPADM

## 2022-08-09 RX ORDER — CARVEDILOL 25 MG/1
TABLET ORAL
COMMUNITY
End: 2022-09-13

## 2022-08-09 RX ORDER — MORPHINE SULFATE 4 MG/ML
2 INJECTION, SOLUTION INTRAMUSCULAR; INTRAVENOUS EVERY 4 HOURS PRN
Status: DISCONTINUED | OUTPATIENT
Start: 2022-08-09 | End: 2022-08-12 | Stop reason: HOSPADM

## 2022-08-09 RX ORDER — OXYCODONE AND ACETAMINOPHEN 10; 325 MG/1; MG/1
1 TABLET ORAL EVERY 6 HOURS PRN
Status: DISCONTINUED | OUTPATIENT
Start: 2022-08-09 | End: 2022-08-12 | Stop reason: HOSPADM

## 2022-08-09 RX ORDER — GENTAMICIN SULFATE 1 MG/G
CREAM TOPICAL
Status: ON HOLD | COMMUNITY
Start: 2022-03-22 | End: 2023-06-29 | Stop reason: CLARIF

## 2022-08-09 RX ORDER — ISOSORBIDE DINITRATE 20 MG/1
20 TABLET ORAL DAILY
COMMUNITY
Start: 2022-08-01 | End: 2023-06-28

## 2022-08-09 RX ORDER — GABAPENTIN 300 MG/1
CAPSULE ORAL
Status: ON HOLD | COMMUNITY
Start: 2021-10-22 | End: 2023-08-09 | Stop reason: HOSPADM

## 2022-08-09 RX ORDER — LISINOPRIL 40 MG/1
TABLET ORAL
Status: ON HOLD | COMMUNITY
Start: 2021-10-22 | End: 2023-06-29

## 2022-08-09 RX ORDER — BUSPIRONE HYDROCHLORIDE 5 MG/1
TABLET ORAL
Status: ON HOLD | COMMUNITY
Start: 2021-10-22 | End: 2023-08-09 | Stop reason: HOSPADM

## 2022-08-09 RX ORDER — TIZANIDINE HYDROCHLORIDE 4 MG/1
4 CAPSULE, GELATIN COATED ORAL 2 TIMES DAILY PRN
Status: ON HOLD | COMMUNITY
Start: 2021-10-05 | End: 2023-06-29

## 2022-08-09 RX ORDER — AMLODIPINE BESYLATE 5 MG/1
10 TABLET ORAL DAILY
Status: DISCONTINUED | OUTPATIENT
Start: 2022-08-10 | End: 2022-08-12 | Stop reason: HOSPADM

## 2022-08-09 RX ORDER — ONDANSETRON 2 MG/ML
4 INJECTION INTRAMUSCULAR; INTRAVENOUS EVERY 6 HOURS PRN
Status: DISCONTINUED | OUTPATIENT
Start: 2022-08-09 | End: 2022-08-12 | Stop reason: HOSPADM

## 2022-08-09 RX ORDER — RIVAROXABAN 20 MG/1
TABLET, FILM COATED ORAL
Status: ON HOLD | COMMUNITY
Start: 2022-08-01 | End: 2023-08-09 | Stop reason: HOSPADM

## 2022-08-09 RX ORDER — AMLODIPINE BESYLATE 10 MG/1
TABLET ORAL
Status: ON HOLD | COMMUNITY
Start: 2021-10-05 | End: 2023-08-09 | Stop reason: HOSPADM

## 2022-08-09 RX ORDER — LORAZEPAM 1 MG/1
1 TABLET ORAL 2 TIMES DAILY
Status: ON HOLD | COMMUNITY
Start: 2022-08-01 | End: 2023-08-09 | Stop reason: HOSPADM

## 2022-08-09 RX ORDER — PANTOPRAZOLE SODIUM 40 MG/1
TABLET, DELAYED RELEASE ORAL
Status: ON HOLD | COMMUNITY
End: 2023-08-09 | Stop reason: HOSPADM

## 2022-08-09 RX ORDER — IBUPROFEN 600 MG/1
TABLET ORAL
Status: ON HOLD | COMMUNITY
Start: 2022-02-21 | End: 2023-06-29 | Stop reason: CLARIF

## 2022-08-09 RX ORDER — TEMAZEPAM 30 MG/1
30 CAPSULE ORAL NIGHTLY
Status: ON HOLD | COMMUNITY
Start: 2022-08-01 | End: 2023-08-09 | Stop reason: HOSPADM

## 2022-08-09 RX ORDER — NITROFURANTOIN 25; 75 MG/1; MG/1
100 CAPSULE ORAL
COMMUNITY
End: 2023-06-28

## 2022-08-09 RX ORDER — CARVEDILOL 12.5 MG/1
25 TABLET ORAL 2 TIMES DAILY
Status: DISCONTINUED | OUTPATIENT
Start: 2022-08-09 | End: 2022-08-12 | Stop reason: HOSPADM

## 2022-08-09 RX ORDER — ATORVASTATIN CALCIUM 20 MG/1
20 TABLET, FILM COATED ORAL NIGHTLY
Status: ON HOLD | COMMUNITY
Start: 2022-08-01 | End: 2023-08-09 | Stop reason: HOSPADM

## 2022-08-09 RX ORDER — ATORVASTATIN CALCIUM 10 MG/1
20 TABLET, FILM COATED ORAL NIGHTLY
Status: DISCONTINUED | OUTPATIENT
Start: 2022-08-09 | End: 2022-08-12 | Stop reason: HOSPADM

## 2022-08-09 RX ORDER — METHOCARBAMOL 500 MG/1
TABLET, FILM COATED ORAL
COMMUNITY
Start: 2022-03-04 | End: 2023-06-28

## 2022-08-09 RX ORDER — HYDROCODONE BITARTRATE AND ACETAMINOPHEN 5; 325 MG/1; MG/1
1 TABLET ORAL EVERY 6 HOURS PRN
Status: DISCONTINUED | OUTPATIENT
Start: 2022-08-09 | End: 2022-08-12 | Stop reason: HOSPADM

## 2022-08-09 RX ORDER — LORAZEPAM 1 MG/1
1 TABLET ORAL 2 TIMES DAILY
Status: DISCONTINUED | OUTPATIENT
Start: 2022-08-09 | End: 2022-08-12 | Stop reason: HOSPADM

## 2022-08-09 RX ORDER — TRAZODONE HYDROCHLORIDE 50 MG/1
50 TABLET ORAL NIGHTLY
Status: DISCONTINUED | OUTPATIENT
Start: 2022-08-09 | End: 2022-08-12 | Stop reason: HOSPADM

## 2022-08-09 RX ORDER — DOXYCYCLINE 100 MG/1
100 CAPSULE ORAL DAILY
Status: ON HOLD | COMMUNITY
Start: 2022-08-01 | End: 2023-08-09 | Stop reason: HOSPADM

## 2022-08-09 RX ORDER — TRAZODONE HYDROCHLORIDE 50 MG/1
50 TABLET ORAL NIGHTLY
Status: ON HOLD | COMMUNITY
Start: 2021-11-23 | End: 2023-08-09 | Stop reason: HOSPADM

## 2022-08-09 RX ORDER — LINAGLIPTIN 5 MG/1
5 TABLET, FILM COATED ORAL
COMMUNITY
Start: 2021-10-05 | End: 2023-06-28

## 2022-08-09 RX ADMIN — BUSPIRONE HYDROCHLORIDE 5 MG: 5 TABLET ORAL at 09:08

## 2022-08-09 RX ADMIN — LORAZEPAM 1 MG: 1 TABLET ORAL at 09:08

## 2022-08-09 RX ADMIN — CARVEDILOL 25 MG: 12.5 TABLET, FILM COATED ORAL at 09:08

## 2022-08-09 RX ADMIN — ONDANSETRON 4 MG: 2 INJECTION INTRAMUSCULAR; INTRAVENOUS at 05:08

## 2022-08-09 RX ADMIN — MORPHINE SULFATE 2 MG: 4 INJECTION INTRAVENOUS at 10:08

## 2022-08-09 RX ADMIN — ATORVASTATIN CALCIUM 20 MG: 10 TABLET, FILM COATED ORAL at 09:08

## 2022-08-09 RX ADMIN — MORPHINE SULFATE 2 MG: 4 INJECTION INTRAVENOUS at 05:08

## 2022-08-09 RX ADMIN — TRAZODONE HYDROCHLORIDE 50 MG: 50 TABLET ORAL at 09:08
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
## 2022-08-09 NOTE — H&P
"[facility] Hospital Medicine History & Physical Examination       Patient Name: Bianca Khan  MRN: [id number]  Patient Class: IP- Inpatient   Admission Date: 8/9/2022   Admitting Physician: MAURA Service   Length of Stay: 0  Attending Physician: Haven Pringle DO  Primary Care Provider: SWAPNIL Garcias  Face-to-Face encounter date: 08/09/2022  Code Status:Good Outcome Following Attempted Resuscitation (GO-FAR) Score:  The Good Outcome Following Attempted Resuscitation (GO-FAR) score provides validated risk stratification for a patients chance of neurologically-intact survival to discharge should he/she be successfully resuscitated following in-hospital cardiopulmonary arrest. The clinical significance of the GO-FAR score may be discussed with the patient and/or family while coming to a decision about code status.     Age: <70      Interpretation Key:   GO-FAR Score       Likelihood of NISD       -15 to - 6       Above average > 15 %         - 5 to 13       Average     >3 to 15 %         14 to 23       Low                1 to 3 %               > 23       Very low             < 1 %            Chief Complaint: right leg swelling (Right leg pain/ swelling and "hears a crack " 2 days ago. Bump right leg last saturday)        Patient information was obtained from patient, patient's family, past medical records and ER records.     HISTORY OF PRESENT ILLNESS:   Bianca Khan is a 57 y.o. male who  has a past medical history of Arthritis, Chronic ulcer of ankle (05/26/2022), Frequent UTI (07/02/2019), Generalized anxiety disorder (05/26/2022), Neurogenic bladder (05/26/2022), Osteomyelitis (05/26/2022), Presence of suprapubic catheter (05/26/2022), Pure hypercholesterolemia (05/26/2022), Retention of urine, unspecified (08/09/2019), and Spinal cord injury at T1-T6 level (04/20/2018).. The patient presented to [facility] on 8/9/2022 with a primary complaint of right foot pain, he tells me that " his foot was how and the door hit it and he heard a crack and felt pain, he came into the ED and is noted to have fractures of the tibia and fibula.  He is a paraplegic with suprapubic catheter, very aware of his medical problems; he has home health at home and resides with is wife.   He asked could he go home, stating that he was unaware that he had to stay. However he did speak with ortho team and has consented to surgery in the morning.  He has no associated symptoms at this time.  Vitals reviewed and are stable.     PAST MEDICAL HISTORY:     Past Medical History:   Diagnosis Date    Arthritis     Chronic ulcer of ankle 05/26/2022    Frequent UTI 07/02/2019    Generalized anxiety disorder 05/26/2022    Neurogenic bladder 05/26/2022    Osteomyelitis 05/26/2022    Presence of suprapubic catheter 05/26/2022    Pure hypercholesterolemia 05/26/2022    Retention of urine, unspecified 08/09/2019    Spinal cord injury at T1-T6 level 04/20/2018       PAST SURGICAL HISTORY:   No past surgical history on file.    ALLERGIES:   Baclofen    FAMILY HISTORY:   Reviewed and negative    SOCIAL HISTORY:     Social History     Tobacco Use    Smoking status: Not on file    Smokeless tobacco: Not on file   Substance Use Topics    Alcohol use: Not on file        HOME MEDICATIONS:     Prior to Admission medications    Not on File       REVIEW OF SYSTEMS:   Except as documented, all other systems reviewed and negative     PHYSICAL EXAM:     VITAL SIGNS: 24 HRS MIN & MAX LAST   Temp  Min: 98.4 °F (36.9 °C)  Max: 98.4 °F (36.9 °C) 98.4 °F (36.9 °C)   BP  Min: 139/82  Max: 151/96 (!) 146/78   Pulse  Min: 67  Max: 79  67   Resp  Min: 18  Max: 18 18   SpO2  Min: 97 %  Max: 100 % 98 %       General appearance: chronically ill male in no apparent distress.  HENT: Atraumatic head. Moist mucous membranes of oral cavity.     suprapubic lentz catheter present.   Neck: Supple.     Lungs: Clear to auscultation bilaterally. No wheezing  present.     Heart: Regular rate and rhythm. S1 and S2 present     Abdomen: Soft, non-distended, non-tender. No rebound tenderness/guarding. Bowel sounds are normal.     Skin: No appreciable skin rash on upper or lower extremities     Neuro: awake , alert, oriented, appropriate level of comprehension.    Psych/mental status: Appropriate mood and affect. Responds appropriately to questions.     LABS AND IMAGING:   No results for input(s): WBC, RBC, HGB, HCT, MCV, MCH, MCHC, RDW, PLT, MPV, GRAN, LYMPH, MONO, BASO, NRBC in the last 168 hours.    No results for input(s): NA, K, CL, CO2, ANIONGAP, BUN, CREATININE, GLU, CALCIUM, PH, MG, ALBUMIN, PROT, ALKPHOS, ALT, AST, BILITOT in the last 168 hours.    Microbiology Results (last 7 days)     ** No results found for the last 168 hours. **           X-Ray Chest 1 View  Narrative: EXAMINATION:  XR CHEST 1 VIEW    CLINICAL HISTORY:  Encounter for other preprocedural examination    TECHNIQUE:  Single frontal view of the chest was performed.    COMPARISON:  08/12/2021    FINDINGS:  There are chronic appearing lung changes seen bilaterally. No evidence of acute infiltrate is seen. No mass is seen. No lesion is seen. No pleural effusion is seen. The heart appears normal. The aorta appears normal. The bones and joints show no acute abnormality.  There are old postsurgical changes seen consistent with previous spinal fusion in the upper thoracic spine  Impression: Chronic changes seen no acute process    Electronically signed by: Gloria Gomez  Date:    08/09/2022  Time:    15:01  X-Ray Tibia Fibula 2 View Right  Narrative: EXAMINATION:  XR TIBIA FIBULA 2 VIEW RIGHT    CLINICAL HISTORY:  Pain in right leg    COMPARISON:  None.    FINDINGS:  Acute minimally displaced fracture of the right fibular proximal diaphysis.    Acute minimally displaced fracture of the right tibial distal diaphysis.    Otherwise no acute displaced fractures or dislocations.    Mild to moderate scattered  degenerative changes.    Partially visualized hardware about the distal right femur.    Demineralization about the distal right lower extremity, most pronounced about the foot.    No bony destructive lesions.    Soft tissue swelling at the fracture sites.    Otherwise soft tissues within normal limits.    No radiodense foreign bodies.  Impression: Fractures of the tibia and fibula.    Electronically signed by: Mariana Rouse  Date:    08/09/2022  Time:    12:33        ASSESSMENT & PLAN:     Fracture of the tibia and fibula  Arthritis, Chronic ulcer of ankle    Frequent UTI  Generalized anxiety disorder  Neurogenic bladder   History of Osteomyelitis   Presence of suprapubic catheter  Pure hypercholesterolemia   Spinal cord injury at T1-T6 level       Plan for surgical intervention in the a.m with ortho. NPO at midnight. Hold Xarelto, continue pain medications.  Reviewed and restarted appropriate home medications.   Likely benefit from Ancef prophylatically postob  Change suprapubic catheter prior to discharge   Repeat labs in the a.m  Continue HH at discharge.     VTE Prophylaxis: lovenox for now, hold Xarelto in anticipation for surgery     Patient condition:  Stable/Fair/Gaurded/ Serious/ Critical    __________________________________________________________________________  INPATIENT LIST OF MEDICATIONS     Scheduled Meds:   HYDROcodone-acetaminophen  1 tablet Oral ED 1 Time     Continuous Infusions:  PRN Meds:.HYDROcodone-acetaminophen, morphine, ondansetron      Haven Pringle DO   08/09/2022
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
## 2022-08-10 ENCOUNTER — ANESTHESIA EVENT (OUTPATIENT)
Dept: SURGERY | Facility: HOSPITAL | Age: 57
DRG: 493 | End: 2022-08-10
Payer: MEDICARE

## 2022-08-10 ENCOUNTER — ANESTHESIA (OUTPATIENT)
Dept: SURGERY | Facility: HOSPITAL | Age: 57
DRG: 493 | End: 2022-08-10
Payer: MEDICARE

## 2022-08-10 PROBLEM — S82.241A CLOSED DISPLACED SPIRAL FRACTURE OF SHAFT OF RIGHT TIBIA: Status: ACTIVE | Noted: 2022-08-10

## 2022-08-10 LAB
ALBUMIN SERPL-MCNC: 2.9 GM/DL (ref 3.5–5)
ALBUMIN/GLOB SERPL: 0.8 RATIO (ref 1.1–2)
ALP SERPL-CCNC: 97 UNIT/L (ref 40–150)
ALT SERPL-CCNC: 8 UNIT/L (ref 0–55)
AST SERPL-CCNC: 13 UNIT/L (ref 5–34)
BASOPHILS # BLD AUTO: 0.02 X10(3)/MCL (ref 0–0.2)
BASOPHILS NFR BLD AUTO: 0.4 %
BILIRUBIN DIRECT+TOT PNL SERPL-MCNC: 0.5 MG/DL
BUN SERPL-MCNC: 5.9 MG/DL (ref 8.4–25.7)
CALCIUM SERPL-MCNC: 8.8 MG/DL (ref 8.4–10.2)
CHLORIDE SERPL-SCNC: 107 MMOL/L (ref 98–107)
CO2 SERPL-SCNC: 26 MMOL/L (ref 22–29)
CREAT SERPL-MCNC: 0.74 MG/DL (ref 0.73–1.18)
EOSINOPHIL # BLD AUTO: 0.1 X10(3)/MCL (ref 0–0.9)
EOSINOPHIL NFR BLD AUTO: 1.8 %
ERYTHROCYTE [DISTWIDTH] IN BLOOD BY AUTOMATED COUNT: 15 % (ref 11.5–17)
GFR SERPLBLD CREATININE-BSD FMLA CKD-EPI: >60 MLS/MIN/1.73/M2
GLOBULIN SER-MCNC: 3.5 GM/DL (ref 2.4–3.5)
GLUCOSE SERPL-MCNC: 89 MG/DL (ref 74–100)
HCT VFR BLD AUTO: 37.6 % (ref 42–52)
HGB BLD-MCNC: 11.5 GM/DL (ref 14–18)
IMM GRANULOCYTES # BLD AUTO: 0.02 X10(3)/MCL (ref 0–0.04)
IMM GRANULOCYTES NFR BLD AUTO: 0.4 %
LYMPHOCYTES # BLD AUTO: 2.69 X10(3)/MCL (ref 0.6–4.6)
LYMPHOCYTES NFR BLD AUTO: 47.4 %
MAGNESIUM SERPL-MCNC: 1.8 MG/DL (ref 1.6–2.6)
MCH RBC QN AUTO: 28.1 PG (ref 27–31)
MCHC RBC AUTO-ENTMCNC: 30.6 MG/DL (ref 33–36)
MCV RBC AUTO: 91.9 FL (ref 80–94)
MONOCYTES # BLD AUTO: 0.51 X10(3)/MCL (ref 0.1–1.3)
MONOCYTES NFR BLD AUTO: 9 %
NEUTROPHILS # BLD AUTO: 2.3 X10(3)/MCL (ref 2.1–9.2)
NEUTROPHILS NFR BLD AUTO: 41 %
NRBC BLD AUTO-RTO: 0 %
PHOSPHATE SERPL-MCNC: 4.2 MG/DL (ref 2.3–4.7)
PLATELET # BLD AUTO: 322 X10(3)/MCL (ref 130–400)
PMV BLD AUTO: 9.1 FL (ref 7.4–10.4)
POCT GLUCOSE: 83 MG/DL (ref 70–110)
POTASSIUM SERPL-SCNC: 3.8 MMOL/L (ref 3.5–5.1)
PROT SERPL-MCNC: 6.4 GM/DL (ref 6.4–8.3)
RBC # BLD AUTO: 4.09 X10(6)/MCL (ref 4.7–6.1)
SODIUM SERPL-SCNC: 139 MMOL/L (ref 136–145)
WBC # SPEC AUTO: 5.7 X10(3)/MCL (ref 4.5–11.5)

## 2022-08-10 PROCEDURE — 99223 1ST HOSP IP/OBS HIGH 75: CPT | Mod: ,,, | Performed by: ORTHOPAEDIC SURGERY

## 2022-08-10 PROCEDURE — 25000003 PHARM REV CODE 250: Performed by: NURSE PRACTITIONER

## 2022-08-10 PROCEDURE — 63600175 PHARM REV CODE 636 W HCPCS: Performed by: NURSE PRACTITIONER

## 2022-08-10 PROCEDURE — 84100 ASSAY OF PHOSPHORUS: CPT | Performed by: STUDENT IN AN ORGANIZED HEALTH CARE EDUCATION/TRAINING PROGRAM

## 2022-08-10 PROCEDURE — 27759 TREATMENT OF TIBIA FRACTURE: CPT | Mod: RT,,, | Performed by: ORTHOPAEDIC SURGERY

## 2022-08-10 PROCEDURE — 36000710: Performed by: ORTHOPAEDIC SURGERY

## 2022-08-10 PROCEDURE — 25000003 PHARM REV CODE 250: Performed by: NURSE ANESTHETIST, CERTIFIED REGISTERED

## 2022-08-10 PROCEDURE — 27800903 OPTIME MED/SURG SUP & DEVICES OTHER IMPLANTS: Performed by: ORTHOPAEDIC SURGERY

## 2022-08-10 PROCEDURE — 99223 PR INITIAL HOSPITAL CARE,LEVL III: ICD-10-PCS | Mod: ,,, | Performed by: ORTHOPAEDIC SURGERY

## 2022-08-10 PROCEDURE — 63600175 PHARM REV CODE 636 W HCPCS

## 2022-08-10 PROCEDURE — 37000009 HC ANESTHESIA EA ADD 15 MINS: Performed by: ORTHOPAEDIC SURGERY

## 2022-08-10 PROCEDURE — 71000033 HC RECOVERY, INTIAL HOUR: Performed by: ORTHOPAEDIC SURGERY

## 2022-08-10 PROCEDURE — 36415 COLL VENOUS BLD VENIPUNCTURE: CPT | Performed by: NURSE PRACTITIONER

## 2022-08-10 PROCEDURE — 36000711: Performed by: ORTHOPAEDIC SURGERY

## 2022-08-10 PROCEDURE — C1713 ANCHOR/SCREW BN/BN,TIS/BN: HCPCS | Performed by: ORTHOPAEDIC SURGERY

## 2022-08-10 PROCEDURE — 27759 PR TREAT TIBIAL SHAFT FX, INTRAMED IMPLANT: ICD-10-PCS | Mod: RT,,, | Performed by: ORTHOPAEDIC SURGERY

## 2022-08-10 PROCEDURE — C1769 GUIDE WIRE: HCPCS | Performed by: ORTHOPAEDIC SURGERY

## 2022-08-10 PROCEDURE — 37000008 HC ANESTHESIA 1ST 15 MINUTES: Performed by: ORTHOPAEDIC SURGERY

## 2022-08-10 PROCEDURE — 63600175 PHARM REV CODE 636 W HCPCS: Performed by: ANESTHESIOLOGY

## 2022-08-10 PROCEDURE — 83735 ASSAY OF MAGNESIUM: CPT | Performed by: STUDENT IN AN ORGANIZED HEALTH CARE EDUCATION/TRAINING PROGRAM

## 2022-08-10 PROCEDURE — 63600175 PHARM REV CODE 636 W HCPCS: Performed by: NURSE ANESTHETIST, CERTIFIED REGISTERED

## 2022-08-10 PROCEDURE — 11000001 HC ACUTE MED/SURG PRIVATE ROOM

## 2022-08-10 PROCEDURE — 27201423 OPTIME MED/SURG SUP & DEVICES STERILE SUPPLY: Performed by: ORTHOPAEDIC SURGERY

## 2022-08-10 PROCEDURE — 71000039 HC RECOVERY, EACH ADD'L HOUR: Performed by: ORTHOPAEDIC SURGERY

## 2022-08-10 PROCEDURE — 94799 UNLISTED PULMONARY SVC/PX: CPT

## 2022-08-10 PROCEDURE — 80053 COMPREHEN METABOLIC PANEL: CPT | Performed by: NURSE PRACTITIONER

## 2022-08-10 PROCEDURE — 85025 COMPLETE CBC W/AUTO DIFF WBC: CPT | Performed by: NURSE PRACTITIONER

## 2022-08-10 DEVICE — SCREW XL25 IM 38X5MM
Type: IMPLANTABLE DEVICE | Site: LEG | Status: NON-FUNCTIONAL
Removed: 2023-12-01

## 2022-08-10 DEVICE — SCREW BONE XL25 40X5MM
Type: IMPLANTABLE DEVICE | Site: LEG | Status: NON-FUNCTIONAL
Removed: 2023-12-01

## 2022-08-10 DEVICE — NAIL IM PEEK TIB 10X345MM
Type: IMPLANTABLE DEVICE | Site: LEG | Status: NON-FUNCTIONAL
Removed: 2023-12-01

## 2022-08-10 DEVICE — SCREW XL25 IM LOCK 32X5MM
Type: IMPLANTABLE DEVICE | Site: LEG | Status: NON-FUNCTIONAL
Removed: 2023-12-01

## 2022-08-10 DEVICE — SCREW XL25 IM 36X5MM
Type: IMPLANTABLE DEVICE | Site: LEG | Status: NON-FUNCTIONAL
Removed: 2023-12-01

## 2022-08-10 RX ORDER — SODIUM CHLORIDE 0.9 % (FLUSH) 0.9 %
10 SYRINGE (ML) INJECTION
Status: DISCONTINUED | OUTPATIENT
Start: 2022-08-10 | End: 2022-08-12 | Stop reason: HOSPADM

## 2022-08-10 RX ORDER — HYDROMORPHONE HYDROCHLORIDE 2 MG/ML
0.4 INJECTION, SOLUTION INTRAMUSCULAR; INTRAVENOUS; SUBCUTANEOUS EVERY 5 MIN PRN
Status: DISCONTINUED | OUTPATIENT
Start: 2022-08-10 | End: 2022-08-12 | Stop reason: HOSPADM

## 2022-08-10 RX ORDER — METHOCARBAMOL 100 MG/ML
INJECTION, SOLUTION INTRAMUSCULAR; INTRAVENOUS
Status: COMPLETED
Start: 2022-08-10 | End: 2022-08-10

## 2022-08-10 RX ORDER — PHENYLEPHRINE HYDROCHLORIDE 10 MG/ML
INJECTION INTRAVENOUS
Status: DISCONTINUED | OUTPATIENT
Start: 2022-08-10 | End: 2022-08-10

## 2022-08-10 RX ORDER — METHOCARBAMOL 750 MG/1
750 TABLET, FILM COATED ORAL 3 TIMES DAILY PRN
Status: DISCONTINUED | OUTPATIENT
Start: 2022-08-10 | End: 2022-08-12 | Stop reason: HOSPADM

## 2022-08-10 RX ORDER — PROPOFOL 10 MG/ML
VIAL (ML) INTRAVENOUS
Status: DISCONTINUED | OUTPATIENT
Start: 2022-08-10 | End: 2022-08-10

## 2022-08-10 RX ORDER — MIDAZOLAM HYDROCHLORIDE 1 MG/ML
INJECTION INTRAMUSCULAR; INTRAVENOUS
Status: DISCONTINUED | OUTPATIENT
Start: 2022-08-10 | End: 2022-08-10

## 2022-08-10 RX ORDER — ONDANSETRON 2 MG/ML
INJECTION INTRAMUSCULAR; INTRAVENOUS
Status: DISCONTINUED | OUTPATIENT
Start: 2022-08-10 | End: 2022-08-10

## 2022-08-10 RX ORDER — GLYCOPYRROLATE 0.2 MG/ML
INJECTION INTRAMUSCULAR; INTRAVENOUS
Status: DISCONTINUED | OUTPATIENT
Start: 2022-08-10 | End: 2022-08-10

## 2022-08-10 RX ORDER — FENTANYL CITRATE 50 UG/ML
INJECTION, SOLUTION INTRAMUSCULAR; INTRAVENOUS
Status: DISCONTINUED | OUTPATIENT
Start: 2022-08-10 | End: 2022-08-10

## 2022-08-10 RX ORDER — METHOCARBAMOL 100 MG/ML
500 INJECTION, SOLUTION INTRAMUSCULAR; INTRAVENOUS ONCE
Status: COMPLETED | OUTPATIENT
Start: 2022-08-10 | End: 2022-08-10

## 2022-08-10 RX ORDER — HYDROMORPHONE HYDROCHLORIDE 2 MG/ML
INJECTION, SOLUTION INTRAMUSCULAR; INTRAVENOUS; SUBCUTANEOUS
Status: COMPLETED
Start: 2022-08-10 | End: 2022-08-10

## 2022-08-10 RX ORDER — CEFAZOLIN SODIUM 1 G/3ML
INJECTION, POWDER, FOR SOLUTION INTRAMUSCULAR; INTRAVENOUS
Status: DISCONTINUED | OUTPATIENT
Start: 2022-08-10 | End: 2022-08-10

## 2022-08-10 RX ORDER — ENOXAPARIN SODIUM 100 MG/ML
30 INJECTION SUBCUTANEOUS EVERY 12 HOURS
Status: DISCONTINUED | OUTPATIENT
Start: 2022-08-10 | End: 2022-08-12 | Stop reason: HOSPADM

## 2022-08-10 RX ORDER — ESMOLOL HYDROCHLORIDE 10 MG/ML
INJECTION INTRAVENOUS
Status: DISCONTINUED | OUTPATIENT
Start: 2022-08-10 | End: 2022-08-10

## 2022-08-10 RX ORDER — ROCURONIUM BROMIDE 10 MG/ML
INJECTION, SOLUTION INTRAVENOUS
Status: DISCONTINUED | OUTPATIENT
Start: 2022-08-10 | End: 2022-08-10

## 2022-08-10 RX ORDER — CEFAZOLIN SODIUM 2 G/50ML
2 SOLUTION INTRAVENOUS
Status: DISPENSED | OUTPATIENT
Start: 2022-08-10 | End: 2022-08-11

## 2022-08-10 RX ADMIN — BUSPIRONE HYDROCHLORIDE 5 MG: 5 TABLET ORAL at 08:08

## 2022-08-10 RX ADMIN — OXYCODONE AND ACETAMINOPHEN 1 TABLET: 10; 325 TABLET ORAL at 05:08

## 2022-08-10 RX ADMIN — HYDROMORPHONE HYDROCHLORIDE 0.4 MG: 2 INJECTION, SOLUTION INTRAMUSCULAR; INTRAVENOUS; SUBCUTANEOUS at 02:08

## 2022-08-10 RX ADMIN — ESMOLOL HYDROCHLORIDE 20 MG: 10 INJECTION INTRAVENOUS at 01:08

## 2022-08-10 RX ADMIN — METHOCARBAMOL 500 MG: 100 INJECTION, SOLUTION INTRAMUSCULAR; INTRAVENOUS at 02:08

## 2022-08-10 RX ADMIN — GABAPENTIN 300 MG: 300 CAPSULE ORAL at 08:08

## 2022-08-10 RX ADMIN — ATORVASTATIN CALCIUM 20 MG: 10 TABLET, FILM COATED ORAL at 08:08

## 2022-08-10 RX ADMIN — MORPHINE SULFATE 2 MG: 4 INJECTION INTRAVENOUS at 08:08

## 2022-08-10 RX ADMIN — TRAZODONE HYDROCHLORIDE 50 MG: 50 TABLET ORAL at 08:08

## 2022-08-10 RX ADMIN — HYDROMORPHONE HYDROCHLORIDE 0.4 MG: 2 INJECTION, SOLUTION INTRAMUSCULAR; INTRAVENOUS; SUBCUTANEOUS at 01:08

## 2022-08-10 RX ADMIN — GABAPENTIN 300 MG: 300 CAPSULE ORAL at 04:08

## 2022-08-10 RX ADMIN — CARVEDILOL 25 MG: 12.5 TABLET, FILM COATED ORAL at 09:08

## 2022-08-10 RX ADMIN — ONDANSETRON 4 MG: 2 INJECTION INTRAMUSCULAR; INTRAVENOUS at 01:08

## 2022-08-10 RX ADMIN — MIDAZOLAM 2.5 MG: 1 INJECTION INTRAMUSCULAR; INTRAVENOUS at 11:08

## 2022-08-10 RX ADMIN — GLYCOPYRROLATE 0.2 MG: 0.2 INJECTION INTRAMUSCULAR; INTRAVENOUS at 11:08

## 2022-08-10 RX ADMIN — ROCURONIUM BROMIDE 50 MG: 10 SOLUTION INTRAVENOUS at 12:08

## 2022-08-10 RX ADMIN — FENTANYL CITRATE 100 MCG: 50 INJECTION, SOLUTION INTRAMUSCULAR; INTRAVENOUS at 12:08

## 2022-08-10 RX ADMIN — AMLODIPINE BESYLATE 10 MG: 5 TABLET ORAL at 09:08

## 2022-08-10 RX ADMIN — MORPHINE SULFATE 2 MG: 4 INJECTION INTRAVENOUS at 03:08

## 2022-08-10 RX ADMIN — OXYCODONE AND ACETAMINOPHEN 1 TABLET: 10; 325 TABLET ORAL at 09:08

## 2022-08-10 RX ADMIN — PROPOFOL 140 MG: 10 INJECTION, EMULSION INTRAVENOUS at 12:08

## 2022-08-10 RX ADMIN — LORAZEPAM 1 MG: 1 TABLET ORAL at 08:08

## 2022-08-10 RX ADMIN — CARVEDILOL 25 MG: 12.5 TABLET, FILM COATED ORAL at 08:08

## 2022-08-10 RX ADMIN — CEFAZOLIN 2 G: 330 INJECTION, POWDER, FOR SOLUTION INTRAMUSCULAR; INTRAVENOUS at 12:08

## 2022-08-10 RX ADMIN — CEFAZOLIN SODIUM 2 G: 2 SOLUTION INTRAVENOUS at 08:08

## 2022-08-10 RX ADMIN — SODIUM CHLORIDE, SODIUM GLUCONATE, SODIUM ACETATE, POTASSIUM CHLORIDE AND MAGNESIUM CHLORIDE: 526; 502; 368; 37; 30 INJECTION, SOLUTION INTRAVENOUS at 11:08

## 2022-08-10 RX ADMIN — SUGAMMADEX 170 MG: 100 INJECTION, SOLUTION INTRAVENOUS at 01:08

## 2022-08-10 RX ADMIN — PHENYLEPHRINE HYDROCHLORIDE 50 MCG: 10 INJECTION INTRAVENOUS at 12:08

## 2022-08-10 RX ADMIN — ENOXAPARIN SODIUM 30 MG: 30 INJECTION SUBCUTANEOUS at 08:08

## 2022-08-10 NOTE — CONSULTS
"[facility] - Emergency Dept  Orthopedics  Consult Note    Patient Name: Bianca Khan  MRN: [id number]  Admission Date: 8/9/2022  Hospital Length of Stay: 1 days  Attending Provider: Haven Pringle DO  Primary Care Provider: SWAPNIL Garcias    Patient information was obtained from ER records.     Consults  Subjective:  Right tibia fracture, fibular fracture     Principal Problem:<principal problem not specified>    Chief Complaint:   Chief Complaint   Patient presents with    right leg swelling     Right leg pain/ swelling and "hears a crack " 2 days ago. Bump right leg last saturday        HPI:  Mr. Khan is a 57-year-old male, history of paraplegia, 2 days ago, bumped his right leg against a door.  He felt a crack.  He states he does have sensation in his leg, he is not able to move it.  Patient was seen in the ER last night and was noted to have a right tibia shaft, proximal fibula fracture.  He has been since placed in a splint.  He does have a history of ulcers in both ankle, feet area.  He also has extensive past medical history, he has since been admitted to the medicine team.  He also has a history of taking blood thinners.    Past Medical History:   Diagnosis Date    Arthritis     Chronic ulcer of ankle 05/26/2022    Frequent UTI 07/02/2019    Generalized anxiety disorder 05/26/2022    Neurogenic bladder 05/26/2022    Osteomyelitis 05/26/2022    Presence of suprapubic catheter 05/26/2022    Pure hypercholesterolemia 05/26/2022    Retention of urine, unspecified 08/09/2019    Spinal cord injury at T1-T6 level 04/20/2018       No past surgical history on file.    Review of patient's allergies indicates:   Allergen Reactions    Baclofen Itching and Anxiety       Current Facility-Administered Medications   Medication    amLODIPine tablet 10 mg    atorvastatin tablet 20 mg    busPIRone tablet 5 mg    carvediloL tablet 25 mg    gabapentin capsule 300 mg    " HYDROcodone-acetaminophen 5-325 mg per tablet 1 tablet    LORazepam tablet 1 mg    morphine injection 2 mg    ondansetron injection 4 mg    oxyCODONE-acetaminophen  mg per tablet 1 tablet    traZODone tablet 50 mg     Current Outpatient Medications   Medication Sig    amLODIPine (NORVASC) 10 MG tablet 1 tablet    busPIRone (BUSPAR) 5 MG Tab 1 tablet    gabapentin (NEURONTIN) 300 MG capsule 1 capsule    linaGLIPtin (TRADJENTA) 5 mg Tab tablet Take 5 mg by mouth.    lisinopriL (PRINIVIL,ZESTRIL) 40 MG tablet 1 tablet    tiZANidine 4 mg Cap Take 4 mg by mouth 2 (two) times daily as needed.    traZODone (DESYREL) 50 MG tablet Take 50 mg by mouth nightly.    atorvastatin (LIPITOR) 20 MG tablet Take 20 mg by mouth nightly.    carvediloL (COREG) 25 MG tablet 1 tablet with food    doxycycline (VIBRAMYCIN) 100 MG Cap Take 100 mg by mouth once daily.    FLUoxetine 10 MG capsule 1 capsule    gentamicin (GARAMYCIN) 0.1 % cream APPLY TO THE AFFECTED AREA(S) TOPICALLY ONCE DAILY FOR 30 DAYS AS DIRECTED    ibuprofen (ADVIL,MOTRIN) 600 MG tablet TAKE ONE TABLET TWICE DAILY AFTER MEALS FOR PAIN    insulin regular 100 unit/mL Inj injection Inject into the skin 3 (three) times daily as needed.    isosorbide dinitrate (ISORDIL) 20 MG tablet Take 20 mg by mouth once daily.    isosorbide mononitrate (ISMO,MONOKET) 20 MG Tab Take 40 mg by mouth nightly.    LORazepam (ATIVAN) 1 MG tablet Take 1 mg by mouth 2 (two) times daily.    meloxicam (MOBIC) 15 MG tablet Take 15 mg by mouth once daily.    methocarbamoL (ROBAXIN) 500 MG Tab TAKE ONE TABLET BY MOUTH ONCE DAILY AND AND TAKE ONE TABLET BY MOUTH ONCE DAILY AT BEDTIME FOR MUSCLE SPASMS    nitrofurantoin, macrocrystal-monohydrate, (MACROBID) 100 MG capsule Take 100 mg by mouth.    oxybutynin (DITROPAN) 5 MG Tab Take 5 mg by mouth 2 (two) times daily.    oxyCODONE-acetaminophen (PERCOCET)  mg per tablet Take 1 tablet by mouth every 6 (six) hours as  needed.    pantoprazole (PROTONIX) 40 MG tablet 1 tablet    temazepam (RESTORIL) 30 mg capsule Take 30 mg by mouth nightly.    XARELTO 20 mg Tab SMARTSI Tablet(s) By Mouth Every Evening     Family History    None       Tobacco Use    Smoking status: Not on file    Smokeless tobacco: Not on file   Substance and Sexual Activity    Alcohol use: Not on file    Drug use: Not on file    Sexual activity: Not on file     ROS  Objective:  Patient is well-nourished developed male he is awake alert and oriented x3 he has an apparent stress is pleasant and cooperative.  Examination of the right lower extremity compartments are soft and warm.  Skin is intact.  He is in a well-padded posterior splint.  Sensation is decreased though intact, he is not available to flex or extend his toes or ankle he does have a brisk cap refill.     Vital Signs (Most Recent):  Temp: 98.4 °F (36.9 °C) (22 1206)  Pulse: 60 (08/10/22 0556)  Resp: 16 (08/10/22 0523)  BP: 135/86 (08/10/22 0536)  SpO2: 100 % (08/10/22 0556) Vital Signs (24h Range):  Temp:  [98.4 °F (36.9 °C)] 98.4 °F (36.9 °C)  Pulse:  [59-95] 60  Resp:  [16-18] 16  SpO2:  [95 %-100 %] 100 %  BP: (109-190)/() 135/86           There is no height or weight on file to calculate BMI.    No intake or output data in the 24 hours ending 08/10/22 0810    Ortho/SPM Exam    Significant Labs: All pertinent labs within the past 24 hours have been reviewed.    Significant Imaging: I have reviewed and interpreted all pertinent imaging results/findings.    Assessment/Plan:  The Mr. Khan is a 57-year-old male with a 2 day history of a right distal 3rd tibia shaft, proximal fibula fracture  1 at this time we discussed his physical exam and x-ray findings.  We have discussed various treatment options.  He would like to proceed with surgical intervention.  We have discussed the risks benefits and alternatives.  We will set this up at his convenience.  Patient remain NPO.     There  are no hospital problems to display for this patient.      Thank you for your consult. Will plan for the operating room today    Ranjan Rider MD  Orthopedics  [facility] - Emergency Dept

## 2022-08-10 NOTE — ANESTHESIA PROCEDURE NOTES
Intubation    Date/Time: 8/10/2022 12:09 PM  Performed by: Michelle Vicente CRNA  Authorized by: Nabeel Wilhelm MD     Intubation:     Induction:  Intravenous    Intubated:  Postinduction    Mask Ventilation:  Easy mask    Attempts:  1    Attempted By:  CRNA    Method of Intubation:  Direct    Blade:  Rice 2    Laryngeal View Grade: Grade I - full view of cords      Difficult Airway Encountered?: No      Complications:  None    Airway Device:  Oral endotracheal tube    Airway Device Size:  7.5    Style/Cuff Inflation:  Cuffed    Inflation Amount (mL):  7    Tube secured:  22    Secured at:  The lips    Placement Verified By:  Capnometry    Complicating Factors:  None    Findings Post-Intubation:  BS equal bilateral and atraumatic/condition of teeth unchanged

## 2022-08-10 NOTE — BRIEF OP NOTE
[facility] - Periop Services  Brief Operative Note    SUMMARY     Surgery Date: 8/10/2022     Surgeon(s) and Role:     * Jorge Orellana MD - Primary    Assisting Surgeon: None    Pre-op Diagnosis:  Closed displaced spiral fracture of shaft of right tibia, initial encounter [S82.241A]    Post-op Diagnosis:  Post-Op Diagnosis Codes:     * Closed displaced spiral fracture of shaft of right tibia, initial encounter [S82.241A]    Procedure(s) (LRB):  INSERTION, INTRAMEDULLARY MARISA RIGHT TIBIA (Right)    Anesthesia: General    Operative Findings: see dictated op report    Estimated Blood Loss: 75 mL    Estimated Blood Loss has been documented.         Specimens:   Specimen (24h ago, onward)            None      A/P: Tolerated procedure well. Admit to floor. NWB RLE. Full ROM. Will need wound care nursing consultation to evaluate wound to lateral aspect of R ankle.     [id number]

## 2022-08-10 NOTE — OP NOTE
[facility]                       [address] [id number]    PATIENT NAME:      EDGAR PERRY   YOB: 1965  CSN:               [id number]  MRN:               [id number]  ADMIT DATE:        08/09/2022 12:09:00  PHYSICIAN:         Jorge Orellana MD                          OPERATIVE REPORT      DATE OF SURGERY:    08/10/2022 00:00:00    SURGEON:  Jorge Orellana MD    PREOPERATIVE DIAGNOSIS:  Right spiral tibia shaft fracture.    POSTOPERATIVE DIAGNOSIS:  Right spiral tibia shaft fracture.    PROCEDURE:  Intramedullary nailing of right tibia.    ANESTHESIA:  General.    ESTIMATED BLOOD LOSS:  75 cc.    IMPLANTS:  Synthes advanced tibial nail 10 x 345 mm, with 2 proximal and 3   distal interlocking screws.    COMPLICATIONS:  None.    COUNTS:  All counts correct x2 at the end of the case.    INDICATIONS FOR PROCEDURES:  The patient is a 57-year-old male who is a   paraplegic.  He struck his leg against the door frame, had a twisting injury,   and felt a pop.  He has sensation in his limb with spasms, but no motor   function.  He was seen and evaluated in the emergency department after a delayed   presentation.  He was found to have a displaced fracture of the tibia.  He was   admitted to the hospital medicine service, and Orthopedics was consulted for   management of his tibia fracture.  The risks, benefits, and alternatives to   treatment were discussed, and he was brought to the operating room today for   stabilization of his tibia fracture in order to prevent soft tissue damage and   skin erosion and allow for pain control of the limb.    PROCEDURE IN DETAIL:  After informed consent was obtained, the patient was met   in the preoperative holding area and his site was marked.  He was taken to the   operating room.  He was placed supine on the operating table.  General   anesthesia was induced.  All bony  prominences were well padded.  Preoperative   antibiotics were given, and his right lower extremity was prepped and draped in   a standard sterile fashion.  A time-out was done indicating the correct   operative limb and procedure.      He had a very stiff knee.  He had minimal mobility of the patella.  We performed   a lateral parapatellar arthrotomy in order to sublux the patella over, and our   starting point for a semi-extended tibial nail was performed.  The opening   reamer was passed and the ball-tipped guidewire was placed, centered within the   tibial canal.  The fracture was pulled out to length by my assistant.  A   percutaneously applied pointed reduction clamp was used to anatomically reduce   the fracture, and the length was measured.  It was reamed up to 11.5 mm, and a   10 mm nail was slid into position.  Three distal interlocking screws were placed   in a perfect Koi technique.  Two proximal interlocking screws were placed   through the jig.  It was confirmed to be appropriate in position and reduction   on AP and lateral imaging.      The arthrotomy was irrigated and closed using a #1 PDS, 2-0 Monocryl, and   staples.  Staples were used distally as well for the interlocking screw holes.    He was dressed with Xeroform, 4 x 4's, cast padding.  He was awakened,   extubated, and taken to recovery in stable condition.    POSTOPERATIVE PLAN:  He will be admitted to the floor.  He does not weight bear   due to his paraplegia; however, he can perform full range of motion on the right   lower extremity.  Begin dressing changes in 2 days.  He does have a chronic   wound to the lateral aspect of his right ankle.  It is clean and dry, with no   evidence of purulence.  He does have good granulation tissue at the base, and   will need evaluation by our wound care specialist while he is in-house.        ______________________________  MD ROSA Carrizales/VIVIAN  DD:  08/10/2022  Time:  01:24PM  DT:   08/10/2022  Time:  01:35PM  Job #:  [id number]      OPERATIVE REPORT

## 2022-08-10 NOTE — TRANSFER OF CARE
Anesthesia Transfer of Care Note    Patient: Bianca Khan    Procedure(s) Performed: Procedure(s) (LRB):  INSERTION, INTRAMEDULLARY MARISA RIGHT TIBIA (Right)    Patient location: PACU    Anesthesia Type: general    Transport from OR: Transported from OR on room air with adequate spontaneous ventilation    Post pain: adequate analgesia    Post assessment: no apparent anesthetic complications    Post vital signs: stable    Level of consciousness: sedated and awake    Nausea/Vomiting: no nausea/vomiting    Complications: none    Transfer of care protocol was followed      Last vitals:   Visit Vitals  BP (!) 158/91   Pulse 80   Temp 36.4 °C (97.5 °F) (Skin)   Resp 11   SpO2 98%
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
## 2022-08-10 NOTE — PROGRESS NOTES
[facility]  Hospital Medicine Progress Note        Chief Complaint: right leg pain    HPI:    57 y.o. male who  has a past medical history of Arthritis, Chronic ulcer of ankle (05/26/2022), Frequent UTI (07/02/2019), Generalized anxiety disorder (05/26/2022), Neurogenic bladder (05/26/2022), Osteomyelitis (05/26/2022), Presence of suprapubic catheter (05/26/2022), Pure hypercholesterolemia (05/26/2022), Retention of urine, unspecified (08/09/2019), and Spinal cord injury at T1-T6 level (04/20/2018).. The patient presented to [facility] on 8/9/2022 with a primary complaint of right foot pain, he tells me that his foot was how and the door hit it and he heard a crack and felt pain, he came into the ED and is noted to have fractures of the tibia and fibula.  He is a paraplegic with suprapubic catheter, very aware of his medical problems; he has home health at home and resides with is wife.   He asked could he go home, stating that he was unaware that he had to stay. However he did speak with ortho team and has consented to surgery in the morning.  He has no associated symptoms at this time.  Vitals reviewed and are stable.     Interval Hx:   Just returned from surgery.  Doing well.  All needs current med.  Nurse present at bedside as well as wife.  Patient afebrile.  Plan to return home with wife in continue home health.    Objective/physical exam:  General: Appears comfortable, no acute distress.  Integumentary: Warm, dry, intact.  Musculoskeletal: Purposeful movement noted.     Respiratory: No accessory muscle use. Breath sounds are equal.  Cardiovascular: Regular rate. No peripheral edema.    VITAL SIGNS: 24 HRS MIN & MAX LAST   Temp  Min: 97.5 °F (36.4 °C)  Max: 97.5 °F (36.4 °C) 97.5 °F (36.4 °C)   BP  Min: 109/70  Max: 190/93 (!) 167/86   Pulse  Min: 59  Max: 95  80   Resp  Min: 12  Max: 20 16   SpO2  Min: 95 %  Max: 100 % 99 %     X-Ray Tibia Fibula 2 View Right  Narrative:  EXAMINATION:  XR TIBIA FIBULA 2 VIEW RIGHT    CLINICAL HISTORY:  POST OP;    COMPARISON:  08/09/2022  Impression: Status post ORIF for the previously described fracture of the distal right tibia with placement of an intramedullary raven with proximal and distal anchoring screws.  Alignment is improved.    Redemonstration of an acute of the proximal right fibula.  Alignment is also improved.    No new acute displaced fractures. No bony destructive lesions.    Expected immediate postsurgical soft tissue findings.    Electronically signed by: Mariana Rouse  Date:    08/10/2022  Time:    14:33  SURG FL Surgery Fluoro Usage  See OP Notes for results.     IMPRESSION: See OP Notes for results.     This procedure was auto-finalized by: Virtual Radiologist    Recent Labs   Lab 08/09/22  2059 08/10/22  0345   WBC 7.1 5.7   RBC 4.57* 4.09*   HGB 12.8* 11.5*   HCT 41.7* 37.6*   MCV 91.2 91.9   MCH 28.0 28.1   MCHC 30.7* 30.6*   RDW 15.1 15.0    322   MPV 9.8 9.1       Recent Labs   Lab 08/09/22  2059 08/10/22  0345    139   K 4.0 3.8   CO2 24 26   BUN 5.6* 5.9*   CREATININE 0.67* 0.74   CALCIUM 9.3 8.8   MG  --  1.80   ALBUMIN 3.3* 2.9*   ALKPHOS 112 97   ALT 8 8   AST 16 13   BILITOT 0.5 0.5          Microbiology Results (last 7 days)     ** No results found for the last 168 hours. **           See below for Radiology    Scheduled Med:   amLODIPine  10 mg Oral Daily    atorvastatin  20 mg Oral Nightly    busPIRone  5 mg Oral BID    carvediloL  25 mg Oral BID    ceFAZolin (ANCEF) IVPB  2 g Intravenous Q8H    enoxaparin  30 mg Subcutaneous Q12H    gabapentin  300 mg Oral TID    LORazepam  1 mg Oral BID    traZODone  50 mg Oral Nightly        PRN Meds:  HYDROcodone-acetaminophen, HYDROmorphone, methocarbamoL, morphine, ondansetron, oxyCODONE-acetaminophen, sodium chloride 0.9%, sodium chloride 0.9%     Nutrition Status:  Regular diet as tolerated.    Assessment/Plan:  Fracture of the tibia and  fibula  Arthritis, Chronic ulcer of ankle   Generalized anxiety disorder  Neurogenic bladder   History of Osteomyelitis   Presence of suprapubic catheter  Pure hypercholesterolemia   Spinal cord injury at T1-T6 level      Plan  S/p Closed displaced spiral fracture of shaft of right tibia s/p insertion of intramedullary raven right tibia---continue supportive care, pain med PRN.   NWB RLE. Full ROM.  wound care nursing consultated    Reviewed and restarted appropriate home medications.   Change suprapubic catheter prior to discharge   Repeat labs in the a.m  Continue HH at discharge.     Anticipated discharge and Disposition:  Pending.     All diagnosis and differential diagnosis have been reviewed; assessment and plan has been documented; I have personally reviewed the labs and test results that are presently available; I have reviewed the patients medication list; I have reviewed the consulting providers response and recommendations. I have reviewed or attempted to review medical records based upon their availability    All of the patient's questions have been  addressed and answered. Patient's is agreeable to the above stated plan.   I will continue to monitor closely and make adjustments to medical management as needed.          Haven Pringle,    08/10/2022        This note was created with the assistance of Dragon voice recognition software. There may be transcription errors as a result of using this technology however minimal. Effort has been made to assure accuracy of transcription but any obvious errors or omissions should be clarified with the author of the document.

## 2022-08-11 LAB
ANION GAP SERPL CALC-SCNC: 7 MEQ/L
BASOPHILS # BLD AUTO: 0.01 X10(3)/MCL (ref 0–0.2)
BASOPHILS NFR BLD AUTO: 0.2 %
BUN SERPL-MCNC: 7.7 MG/DL (ref 8.4–25.7)
CALCIUM SERPL-MCNC: 8.7 MG/DL (ref 8.4–10.2)
CHLORIDE SERPL-SCNC: 100 MMOL/L (ref 98–107)
CO2 SERPL-SCNC: 27 MMOL/L (ref 22–29)
CREAT SERPL-MCNC: 0.91 MG/DL (ref 0.73–1.18)
CREAT/UREA NIT SERPL: 8
EOSINOPHIL # BLD AUTO: 0.02 X10(3)/MCL (ref 0–0.9)
EOSINOPHIL NFR BLD AUTO: 0.3 %
ERYTHROCYTE [DISTWIDTH] IN BLOOD BY AUTOMATED COUNT: 14.9 % (ref 11.5–17)
GFR SERPLBLD CREATININE-BSD FMLA CKD-EPI: >60 MLS/MIN/1.73/M2
GLUCOSE SERPL-MCNC: 130 MG/DL (ref 74–100)
HCT VFR BLD AUTO: 35.3 % (ref 42–52)
HGB BLD-MCNC: 10.7 GM/DL (ref 14–18)
IMM GRANULOCYTES # BLD AUTO: 0.02 X10(3)/MCL (ref 0–0.04)
IMM GRANULOCYTES NFR BLD AUTO: 0.3 %
LYMPHOCYTES # BLD AUTO: 1.81 X10(3)/MCL (ref 0.6–4.6)
LYMPHOCYTES NFR BLD AUTO: 27.4 %
MCH RBC QN AUTO: 27.4 PG (ref 27–31)
MCHC RBC AUTO-ENTMCNC: 30.3 MG/DL (ref 33–36)
MCV RBC AUTO: 90.3 FL (ref 80–94)
MONOCYTES # BLD AUTO: 0.58 X10(3)/MCL (ref 0.1–1.3)
MONOCYTES NFR BLD AUTO: 8.8 %
NEUTROPHILS # BLD AUTO: 4.2 X10(3)/MCL (ref 2.1–9.2)
NEUTROPHILS NFR BLD AUTO: 63 %
NRBC BLD AUTO-RTO: 0 %
PLATELET # BLD AUTO: 343 X10(3)/MCL (ref 130–400)
PMV BLD AUTO: 9.9 FL (ref 7.4–10.4)
POTASSIUM SERPL-SCNC: 3.5 MMOL/L (ref 3.5–5.1)
RBC # BLD AUTO: 3.91 X10(6)/MCL (ref 4.7–6.1)
SODIUM SERPL-SCNC: 134 MMOL/L (ref 136–145)
WBC # SPEC AUTO: 6.6 X10(3)/MCL (ref 4.5–11.5)

## 2022-08-11 PROCEDURE — 63600175 PHARM REV CODE 636 W HCPCS: Performed by: NURSE PRACTITIONER

## 2022-08-11 PROCEDURE — 99900035 HC TECH TIME PER 15 MIN (STAT)

## 2022-08-11 PROCEDURE — 85025 COMPLETE CBC W/AUTO DIFF WBC: CPT | Performed by: STUDENT IN AN ORGANIZED HEALTH CARE EDUCATION/TRAINING PROGRAM

## 2022-08-11 PROCEDURE — 94799 UNLISTED PULMONARY SVC/PX: CPT

## 2022-08-11 PROCEDURE — 80048 BASIC METABOLIC PNL TOTAL CA: CPT | Performed by: NURSE PRACTITIONER

## 2022-08-11 PROCEDURE — 25000003 PHARM REV CODE 250: Performed by: NURSE PRACTITIONER

## 2022-08-11 PROCEDURE — 36415 COLL VENOUS BLD VENIPUNCTURE: CPT | Performed by: NURSE PRACTITIONER

## 2022-08-11 PROCEDURE — 11000001 HC ACUTE MED/SURG PRIVATE ROOM

## 2022-08-11 PROCEDURE — 25000003 PHARM REV CODE 250: Performed by: INTERNAL MEDICINE

## 2022-08-11 RX ORDER — BUTALBITAL, ACETAMINOPHEN AND CAFFEINE 50; 325; 40 MG/1; MG/1; MG/1
2 TABLET ORAL EVERY 6 HOURS PRN
Status: DISCONTINUED | OUTPATIENT
Start: 2022-08-11 | End: 2022-08-12 | Stop reason: HOSPADM

## 2022-08-11 RX ORDER — ACETAMINOPHEN 500 MG
1000 TABLET ORAL EVERY 6 HOURS PRN
Status: DISCONTINUED | OUTPATIENT
Start: 2022-08-11 | End: 2022-08-12 | Stop reason: HOSPADM

## 2022-08-11 RX ADMIN — GABAPENTIN 300 MG: 300 CAPSULE ORAL at 03:08

## 2022-08-11 RX ADMIN — OXYCODONE AND ACETAMINOPHEN 1 TABLET: 10; 325 TABLET ORAL at 08:08

## 2022-08-11 RX ADMIN — CEFAZOLIN SODIUM 2 G: 2 SOLUTION INTRAVENOUS at 05:08

## 2022-08-11 RX ADMIN — CARVEDILOL 25 MG: 12.5 TABLET, FILM COATED ORAL at 09:08

## 2022-08-11 RX ADMIN — BUSPIRONE HYDROCHLORIDE 5 MG: 5 TABLET ORAL at 08:08

## 2022-08-11 RX ADMIN — AMLODIPINE BESYLATE 10 MG: 5 TABLET ORAL at 09:08

## 2022-08-11 RX ADMIN — ENOXAPARIN SODIUM 30 MG: 30 INJECTION SUBCUTANEOUS at 09:08

## 2022-08-11 RX ADMIN — OXYCODONE AND ACETAMINOPHEN 1 TABLET: 10; 325 TABLET ORAL at 03:08

## 2022-08-11 RX ADMIN — LORAZEPAM 1 MG: 1 TABLET ORAL at 09:08

## 2022-08-11 RX ADMIN — BUSPIRONE HYDROCHLORIDE 5 MG: 5 TABLET ORAL at 09:08

## 2022-08-11 RX ADMIN — GABAPENTIN 300 MG: 300 CAPSULE ORAL at 08:08

## 2022-08-11 RX ADMIN — METHOCARBAMOL 750 MG: 750 TABLET ORAL at 07:08

## 2022-08-11 RX ADMIN — OXYCODONE AND ACETAMINOPHEN 1 TABLET: 10; 325 TABLET ORAL at 09:08

## 2022-08-11 RX ADMIN — GABAPENTIN 300 MG: 300 CAPSULE ORAL at 09:08

## 2022-08-11 RX ADMIN — HYDROCODONE BITARTRATE AND ACETAMINOPHEN 1 TABLET: 5; 325 TABLET ORAL at 07:08

## 2022-08-11 RX ADMIN — CARVEDILOL 25 MG: 12.5 TABLET, FILM COATED ORAL at 08:08

## 2022-08-11 RX ADMIN — ACETAMINOPHEN 1000 MG: 500 TABLET, FILM COATED ORAL at 03:08

## 2022-08-11 NOTE — PROGRESS NOTES
[facility] - 8th Floor Med Surg  Orthopedics  Progress Note    Patient Name: Bianca Khan  MRN: [id number]  Admission Date: 8/9/2022  Hospital Length of Stay: 2 days  Attending Provider: Haven Pringle DO  Primary Care Provider: SWAPNIL Garcias  Follow-up For: Procedure(s) (LRB):  INSERTION, INTRAMEDULLARY MARISA RIGHT TIBIA (Right)    Post-Operative Day: 1 Day Post-Op  Subjective:     Principal Problem:Closed displaced spiral fracture of shaft of right tibia    Principal Orthopedic Problem: same    Interval History: POD 1 IMN R tibia fracture. No acute events since OR. Pt does not complain of pain this morning. He does not report any new issues/complaints.     Review of patient's allergies indicates:   Allergen Reactions    Baclofen Itching and Anxiety       Current Facility-Administered Medications   Medication    amLODIPine tablet 10 mg    atorvastatin tablet 20 mg    busPIRone tablet 5 mg    carvediloL tablet 25 mg    cefazolin (ANCEF) 2 gram in dextrose 5% 50 mL IVPB (premix)    enoxaparin injection 30 mg    gabapentin capsule 300 mg    HYDROcodone-acetaminophen 5-325 mg per tablet 1 tablet    HYDROmorphone (PF) injection 0.4 mg    LORazepam tablet 1 mg    methocarbamoL tablet 750 mg    morphine injection 2 mg    ondansetron injection 4 mg    oxyCODONE-acetaminophen  mg per tablet 1 tablet    sodium chloride 0.9% flush 10 mL    sodium chloride 0.9% flush 10 mL    traZODone tablet 50 mg     Objective:     Vital Signs (Most Recent):  Temp: 98.7 °F (37.1 °C) (08/11/22 0427)  Pulse: 97 (08/11/22 0427)  Resp: 17 (08/11/22 0427)  BP: 111/71 (08/11/22 0427)  SpO2: 100 % (08/11/22 0427) Vital Signs (24h Range):  Temp:  [97.5 °F (36.4 °C)-100.5 °F (38.1 °C)] 98.7 °F (37.1 °C)  Pulse:  [60-99] 97  Resp:  [12-20] 17  SpO2:  [85 %-100 %] 100 %  BP: (111-183)/() 111/71           There is no height or weight on file to calculate BMI.      Intake/Output Summary (Last 24 hours)  at 8/11/2022 0740  Last data filed at 8/11/2022 0000  Gross per 24 hour   Intake 1270 ml   Output 875 ml   Net 395 ml       Ortho/SPM Exam   General: AAO. Well nourished, well perfused, no distress.   R LE:  Dressing clean, dry, intact  Compartments soft and compressible  Calf supple  Tolerates gentle passive ROM of knee  Brisk capillary refill distally  No motor function    Significant Labs:   Recent Lab Results       08/11/22  0319        Anion Gap 7.0       Baso # 0.01       Basophil % 0.2       BUN 7.7       BUN/CREAT RATIO 8       Calcium 8.7       Chloride 100       CO2 27       Creatinine 0.91       eGFR >60       Eos # 0.02       Eosinophil % 0.3       Glucose 130       Hematocrit 35.3       Hemoglobin 10.7       Immature Grans (Abs) 0.02       Immature Granulocytes 0.3       Lymph # 1.81       LYMPH % 27.4       MCH 27.4       MCHC 30.3       MCV 90.3       Mono # 0.58       Mono % 8.8       MPV 9.9       Neut # 4.2       Neut % 63.0       nRBC 0.0       Platelets 343       Potassium 3.5       RBC 3.91       RDW 14.9       Sodium 134       WBC 6.6           All pertinent labs within the past 24 hours have been reviewed.    Significant Imaging: X-Ray: I have reviewed all pertinent results/findings and my personal findings are:  post op xray right tibia demonstrates good fracture alignment with all hardware intact    Assessment/Plan:     Active Diagnoses:    Diagnosis Date Noted POA    PRINCIPAL PROBLEM:  Closed displaced spiral fracture of shaft of right tibia [S82.241A] 08/10/2022 Yes    Chronic ulcer of ankle [L97.309] 05/26/2022 Yes     Chronic      Problems Resolved During this Admission:      Pt is doing well s/p IMN R tibia. H&H stable post op. He is NWB at baseline due to his paraplegia. Ok for ROM and positioning of the limb as tolerated. Ok for dc home from ortho stand point. Begin daily dry dressing changes to incisions tomorrow. Wound care nurse was consulted to eval/treat right lateral ankle  ulcer. 3 doses of ancef post op. On lovenox for dvt ppx. Follow up with Dr. Orellana in 2 weeks for wound check and staple removal.     The above findings, diagnosis, and treatment plan were discussed with Dr. Jorge Orellana who is in agreement.    Fidelia Weston, SWAPNIL  Orthopedics  [facility] - 8th Floor Med Surg

## 2022-08-11 NOTE — PLAN OF CARE
PT lives with his wife Katrina who he states has a recent light stroke, not able to help him at home and his MIL who is sick with stomach cancer. Pt has NSI. He states he was recently dc from [facility] approx 1.5 weeks ago. Pt states he would like to return to [facility] at dc then transition back to [facility] at home.  Pt states he has sores on his buttocks and at dc to home he will need an air matteress for his hospital bed at home.  Referral sent to [facility] via [facility].

## 2022-08-11 NOTE — PROGRESS NOTES
[facility] MEDICINE   PROGRESS NOTE      CHIEF COMPLAINT  Hospital follow up    HOSPITAL COURSE  57 y.o. male who  has a past medical history of Arthritis, Chronic ulcer of ankle (05/26/2022), Frequent UTI (07/02/2019), Generalized anxiety disorder (05/26/2022), Neurogenic bladder (05/26/2022), Osteomyelitis (05/26/2022), Presence of suprapubic catheter (05/26/2022), Pure hypercholesterolemia (05/26/2022), Retention of urine, unspecified (08/09/2019), and Spinal cord injury at T1-T6 level (04/20/2018).. The patient presented to [facility] on 8/9/2022 with a primary complaint of right foot pain, he tells me that his foot was how and the door hit it and he heard a crack and felt pain, he came into the ED and is noted to have fractures of the tibia and fibula.  He is a paraplegic with suprapubic catheter, very aware of his medical problems; he has home health at home and resides with is wife.   He asked could he go home, stating that he was unaware that he had to stay. However he did speak with ortho team and has consented to surgery in the morning.    Today  Seen this morning.  Complains of headache, states it happens when his bp is elevated.  Did have a temp this morning.         OBJECTIVE/PHYSICAL EXAM  /65 (BP Location: Right arm, Patient Position: Lying)   Pulse 85   Temp 97.6 °F (36.4 °C) (Oral)   Resp 18   SpO2 97%   General: In no acute distress, afebrile  Chest: Clear to auscultation bilaterally  Heart: S1, S2, no appreciable murmur  Abdomen: Soft, nontender, BS +  MSK: Warm, no lower extremity edema, no clubbing or cyanosis  Neurologic: Alert and oriented x4        ASSESSMENT/PLAN  Fracture of the tibia and fibula  Arthritis, Chronic ulcer of ankle   Generalized anxiety disorder  Neurogenic bladder   History of Osteomyelitis   Presence of suprapubic catheter  Pure hypercholesterolemia   Spinal cord injury at T1-T6 level      Plan  S/p Closed displaced spiral  fracture of shaft of right tibia s/p insertion of intramedullary raven right tibia---continue supportive care, pain med PRN.   NWB RLE. Full ROM.  wound care nursing consultated     Reviewed and restarted appropriate home medications.   Suprapubic changed today.   CM for [facility] rehab placement     Anticipated discharge and disposition:   __________________________________________________________________________    LABS/MICROBIOLOGY/MEDICATIONS/DIAGNOSTICS    LABS  Recent Labs     08/10/22  0345 08/11/22  0319    134*   K 3.8 3.5   CHLORIDE 107 100   CO2 26 27   BUN 5.9* 7.7*   CREATININE 0.74 0.91   GLUCOSE 89 130*   CALCIUM 8.8 8.7   ALKPHOS 97  --    AST 13  --    ALT 8  --    ALBUMIN 2.9*  --      Recent Labs     08/11/22 0319   WBC 6.6   RBC 3.91*   HCT 35.3*   MCV 90.3          MICROBIOLOGY  Microbiology Results (last 7 days)     ** No results found for the last 168 hours. **          MEDICATIONS   amLODIPine  10 mg Oral Daily    atorvastatin  20 mg Oral Nightly    busPIRone  5 mg Oral BID    carvediloL  25 mg Oral BID    enoxaparin  30 mg Subcutaneous Q12H    gabapentin  300 mg Oral TID    LORazepam  1 mg Oral BID    traZODone  50 mg Oral Nightly      INFUSIONS      DIAGNOSTIC TESTS  X-Ray Tibia Fibula 2 View Right   Final Result      Status post ORIF for the previously described fracture of the distal right tibia with placement of an intramedullary raven with proximal and distal anchoring screws.  Alignment is improved.      Redemonstration of an acute of the proximal right fibula.  Alignment is also improved.      No new acute displaced fractures. No bony destructive lesions.      Expected immediate postsurgical soft tissue findings.         Electronically signed by: Mariana Rouse   Date:    08/10/2022   Time:    14:33      SURG FL Surgery Fluoro Usage   Final Result      X-Ray Chest 1 View   Final Result      Chronic changes seen no acute process         Electronically signed by: Gloria  Jason   Date:    08/09/2022   Time:    15:01      X-Ray Tibia Fibula 2 View Right   Final Result      Fractures of the tibia and fibula.         Electronically signed by: Mariana Rouse   Date:    08/09/2022   Time:    12:33               All diagnosis and differential diagnosis have been reviewed; assessment and plan has been documented. I have personally reviewed the labs and test results that are presently available; I have reviewed the patients medication list. I have reviewed the consulting providers response and recommendations. I have reviewed or attempted to review medical records based upon their availability.  All of the patient's questions have been addressed and answered. Patient's is agreeable to the above stated plan. I will continue to monitor closely and make adjustments to medical management as needed.  This document was created using M*Modal Fluency Direct.  Transcription errors may have been made.  Please contact me if any questions may rise regarding documentation to clarify verbiage.        Michele Pagan MD   08/11/2022   Internal Medicine

## 2022-08-11 NOTE — ANESTHESIA POSTPROCEDURE EVALUATION
Anesthesia Post Evaluation    Patient: Bianca Khan    Procedure(s) Performed: Procedure(s) (LRB):  INSERTION, INTRAMEDULLARY MARISA RIGHT TIBIA (Right)    Final Anesthesia Type: general      Patient location during evaluation: PACU  Patient participation: Yes- Able to Participate  Level of consciousness: awake and alert and oriented  Post-procedure vital signs: reviewed and stable  Pain management: adequate  Airway patency: patent  LADY mitigation strategies: Verification of full reversal of neuromuscular block  PONV status at discharge: No PONV  Anesthetic complications: no      Cardiovascular status: blood pressure returned to baseline and stable  Respiratory status: spontaneous ventilation, unassisted and room air  Hydration status: euvolemic  Follow-up not needed.  Comments: [facility]          Vitals Value Taken Time   /98 08/10/22 1713   Temp 36.4 °C (97.5 °F) 08/10/22 1343   Pulse 73 08/10/22 1850   Resp 20 08/10/22 1700   SpO2 98 % 08/10/22 1850         Event Time   Out of Recovery 08/10/2022 15:25:00         Pain/Adriana Score: Pain Rating Prior to Med Admin: 9 (8/10/2022  5:00 PM)  Pain Rating Post Med Admin: 3 (8/10/2022  6:00 PM)  Adriana Score: 10 (8/10/2022  4:00 PM)

## 2022-08-11 NOTE — PROGRESS NOTES
Attempted IE and pt declines at this time d/t c/o severe migraine, educated on plan and role of therapy, will con't to attempt again as schedule allows/as appropriate.

## 2022-08-11 NOTE — PT/OT/SLP PROGRESS
Physical Therapy      Patient Name:  Bianca Khan   MRN:  [id number]    Patient not seen today secondary to refusal 2/2 reported migraine  . Will follow-up when able for PT evaluation.

## 2022-08-12 VITALS
SYSTOLIC BLOOD PRESSURE: 138 MMHG | RESPIRATION RATE: 18 BRPM | HEART RATE: 85 BPM | TEMPERATURE: 98 F | DIASTOLIC BLOOD PRESSURE: 81 MMHG | OXYGEN SATURATION: 96 %

## 2022-08-12 PROCEDURE — 97166 OT EVAL MOD COMPLEX 45 MIN: CPT

## 2022-08-12 PROCEDURE — 63600175 PHARM REV CODE 636 W HCPCS: Performed by: NURSE PRACTITIONER

## 2022-08-12 PROCEDURE — 25000003 PHARM REV CODE 250: Performed by: NURSE PRACTITIONER

## 2022-08-12 PROCEDURE — 97535 SELF CARE MNGMENT TRAINING: CPT

## 2022-08-12 PROCEDURE — 99900035 HC TECH TIME PER 15 MIN (STAT)

## 2022-08-12 PROCEDURE — 97162 PT EVAL MOD COMPLEX 30 MIN: CPT

## 2022-08-12 PROCEDURE — 94799 UNLISTED PULMONARY SVC/PX: CPT

## 2022-08-12 RX ADMIN — METHOCARBAMOL 750 MG: 750 TABLET ORAL at 04:08

## 2022-08-12 RX ADMIN — OXYCODONE AND ACETAMINOPHEN 1 TABLET: 10; 325 TABLET ORAL at 08:08

## 2022-08-12 RX ADMIN — HYDROCODONE BITARTRATE AND ACETAMINOPHEN 1 TABLET: 5; 325 TABLET ORAL at 12:08

## 2022-08-12 RX ADMIN — GABAPENTIN 300 MG: 300 CAPSULE ORAL at 08:08

## 2022-08-12 RX ADMIN — BUSPIRONE HYDROCHLORIDE 5 MG: 5 TABLET ORAL at 08:08

## 2022-08-12 RX ADMIN — LORAZEPAM 1 MG: 1 TABLET ORAL at 08:08

## 2022-08-12 RX ADMIN — OXYCODONE AND ACETAMINOPHEN 1 TABLET: 10; 325 TABLET ORAL at 04:08

## 2022-08-12 RX ADMIN — MORPHINE SULFATE 2 MG: 4 INJECTION INTRAVENOUS at 02:08

## 2022-08-12 RX ADMIN — CARVEDILOL 25 MG: 12.5 TABLET, FILM COATED ORAL at 08:08

## 2022-08-12 RX ADMIN — ENOXAPARIN SODIUM 30 MG: 30 INJECTION SUBCUTANEOUS at 08:08

## 2022-08-12 RX ADMIN — AMLODIPINE BESYLATE 10 MG: 5 TABLET ORAL at 08:08

## 2022-08-12 NOTE — PROGRESS NOTES
[facility] MEDICINE   PROGRESS NOTE      CHIEF COMPLAINT  Hospital follow up    HOSPITAL COURSE  57 y.o. male who  has a past medical history of Arthritis, Chronic ulcer of ankle (05/26/2022), Frequent UTI (07/02/2019), Generalized anxiety disorder (05/26/2022), Neurogenic bladder (05/26/2022), Osteomyelitis (05/26/2022), Presence of suprapubic catheter (05/26/2022), Pure hypercholesterolemia (05/26/2022), Retention of urine, unspecified (08/09/2019), and Spinal cord injury at T1-T6 level (04/20/2018).. The patient presented to [facility] on 8/9/2022 with a primary complaint of right foot pain, he tells me that his foot was how and the door hit it and he heard a crack and felt pain, he came into the ED and is noted to have fractures of the tibia and fibula.  He is a paraplegic with suprapubic catheter, very aware of his medical problems; he has home health at home and resides with is wife.   He asked could he go home, stating that he was unaware that he had to stay. However he did speak with ortho team and has consented to surgery in the morning.  On August 10 he underwent intramedullary nailing of the right tibia.    Today  Seen this morning and doing well.  No more having headaches.  Case management discussed with him yesterday and currently the plan is to look for rehab facility for him.        OBJECTIVE/PHYSICAL EXAM  /81   Pulse 87   Temp 97.9 °F (36.6 °C)   Resp 18   SpO2 97%   General: In no acute distress, afebrile  Chest: Clear to auscultation bilaterally  Heart: S1, S2, no appreciable murmur  Abdomen: Soft, nontender, BS +  MSK: Warm, paralysis legs  Neurologic: Alert and oriented x4        ASSESSMENT/PLAN  Closed displaced spiral fracture right tibia shaft-status post intramedullary nailing  Right lateral ankle chronic ulcer POA  Generalized anxiety disorder  Neurogenic bladder   History of Osteomyelitis   Presence of suprapubic catheter  Pure  hypercholesterolemia   Spinal cord injury at T1-T6 level        Orthopedic surgery following.  Okay to discharge from their standpoint outpatient follow-up in 2 weeks with Dr. Orellana.    Urology changed out suprapubic catheter August 11.  Change every 2 weeks.  PT and OT  Case management following.  [facility]    DVT prophylaxis: Lovenox 30 b.i.d.    Anticipated discharge and disposition:  Medically stable for disposition once accepted  __________________________________________________________________________    LABS/MICROBIOLOGY/MEDICATIONS/DIAGNOSTICS    LABS  Recent Labs     08/10/22  0345 08/11/22  0319    134*   K 3.8 3.5   CHLORIDE 107 100   CO2 26 27   BUN 5.9* 7.7*   CREATININE 0.74 0.91   GLUCOSE 89 130*   CALCIUM 8.8 8.7   ALKPHOS 97  --    AST 13  --    ALT 8  --    ALBUMIN 2.9*  --      Recent Labs     08/11/22 0319   WBC 6.6   RBC 3.91*   HCT 35.3*   MCV 90.3          MICROBIOLOGY  Microbiology Results (last 7 days)     ** No results found for the last 168 hours. **          MEDICATIONS   amLODIPine  10 mg Oral Daily    atorvastatin  20 mg Oral Nightly    busPIRone  5 mg Oral BID    carvediloL  25 mg Oral BID    enoxaparin  30 mg Subcutaneous Q12H    gabapentin  300 mg Oral TID    LORazepam  1 mg Oral BID    traZODone  50 mg Oral Nightly      INFUSIONS      DIAGNOSTIC TESTS  X-Ray Tibia Fibula 2 View Right   Final Result      Status post ORIF for the previously described fracture of the distal right tibia with placement of an intramedullary raven with proximal and distal anchoring screws.  Alignment is improved.      Redemonstration of an acute of the proximal right fibula.  Alignment is also improved.      No new acute displaced fractures. No bony destructive lesions.      Expected immediate postsurgical soft tissue findings.         Electronically signed by: Mariana Rouse   Date:    08/10/2022   Time:    14:33      SURG  FL Surgery Fluoro Usage   Final Result      X-Ray Chest 1 View   Final Result      Chronic changes seen no acute process         Electronically signed by: Gloria Gomez   Date:    08/09/2022   Time:    15:01      X-Ray Tibia Fibula 2 View Right   Final Result      Fractures of the tibia and fibula.         Electronically signed by: Mariana Rouse   Date:    08/09/2022   Time:    12:33               All diagnosis and differential diagnosis have been reviewed; assessment and plan has been documented. I have personally reviewed the labs and test results that are presently available; I have reviewed the patients medication list. I have reviewed the consulting providers response and recommendations. I have reviewed or attempted to review medical records based upon their availability.  All of the patient's questions have been addressed and answered. Patient's is agreeable to the above stated plan. I will continue to monitor closely and make adjustments to medical management as needed.  This document was created using M*Modal Fluency Direct.  Transcription errors may have been made.  Please contact me if any questions may rise regarding documentation to clarify verbiage.        Michele Pagan MD   08/12/2022   Internal Medicine

## 2022-08-12 NOTE — PLAN OF CARE
Clinical updates with PT/OT eval sent to [facility]. Awaiting response from [facility].

## 2022-08-12 NOTE — PLAN OF CARE
PT has dc orders, will send to [facility] via [facility]. Pt states he will ride home in his elec w/c, he states he does not have a vehicle to ride in. I spoke to Cesia and sent her pictures of pt's w/c and she said she would be unable to secure his w/c and therefore she can not transport him home. His wife is at bedside and she and pt in agreement for pt to go home in his elect w/c. He states he has 10 hr battery life on his w/c.

## 2022-08-12 NOTE — NURSING
WODAYRON consulted for Right lateral ankle, Went to evaluate patient, he was not in room. Spoke to nurse Perea who stated patient  was on another floor visiting family member, unable to perform evaluation at this time will try again later today.

## 2022-08-12 NOTE — PT/OT/SLP EVAL
"Occupational Therapy   Evaluation    Name: Bianca Khan  MRN: [id number]  Admitting Diagnosis:  Closed displaced spiral fracture of shaft of right tibia  Recent Surgery: Procedure(s) (LRB):  INSERTION, INTRAMEDULLARY MARISA RIGHT TIBIA (Right) 2 Days Post-Op    Recommendations:     Discharge Recommendations: rehabilitation facility, home with home health, home health PT, home health OT  Discharge Equipment Recommendations:  none  Barriers to discharge:  Decreased caregiver support    Assessment:     Bianca Khan is a 57 y.o. male with a medical diagnosis of Closed displaced spiral fracture of shaft of right tibia.  He presents with some weakness and mild decrease in ADL's from baseline, also with decreased assist if needed at home as wife recently had a stroke and is also tending to her sick mother who is in the hospital.  Performance deficits affecting function: weakness, impaired endurance, impaired self care skills, impaired functional mobility.      Rehab Prognosis: Good; patient would benefit from acute skilled OT services to address these deficits and reach maximum level of function.       Plan:     Patient to be seen 3 x/week, 5 x/week to address the above listed problems via self-care/home management, therapeutic activities, therapeutic exercises  · Plan of Care Expires: 09/09/22  · Plan of Care Reviewed with: patient    Subjective     Chief Complaint: "I don't feel as good as I usually am right now"  Patient/Family Comments/goals: return to full baseline    Occupational Profile:  Living Environment: SS home with wife  Previous level of function: mod I with some setup; wife sets up basins for bathing and pt bathes self in bed or w/c, can dress in bed independently, manages cath independently  Roles and Routines: , son in law  Equipment Used at Home:  wound care supplies, urinary catheter supplies, 3-in-1 commode, power chair (pt has trapeze at home, spong bathes)  Assistance upon Discharge: limited at " this time    Pain/Comfort:  · Pain Rating 1: 0/10    Patients cultural, spiritual, Muslim conflicts given the current situation:      Objective:     Communicated with: nsg and PT, CM  prior to session.  Patient found up in chair with   upon OT entry to room.    General Precautions: Standard,     Orthopedic Precautions:RLE non weight bearing   Braces:    Respiratory Status: Room air    Occupational Performance:    Bed Mobility:    · Patient completed Sit to Supine with minimum assistance from power chair to bed transfer  · Independent with rolling    Functional Mobility/Transfers:  ·   · Functional Mobility: uses power chair independently    Activities of Daily Living:  · Mod assist with donning and doffing brief in bed  · Min assist/sBA transfer to bedside commode    Cognitive/Visual Perceptual:  intact    Physical Exam:  wfl BUE's , LUE with some weakness from RUE    AMPAC 6 Click ADL:  AMPA Total Score:      Treatment & Education:  Educated on POC, rec's, con't with OOB as tolerated and position changes in bed.  Education:    Patient left supine with all lines intact and call button in reach    GOALS:   Multidisciplinary Problems     Occupational Therapy Goals        Problem: Occupational Therapy    Goal Priority Disciplines Outcome Interventions   Occupational Therapy Goal     OT, PT/OT Ongoing, Progressing    Description: Goals to be met by: 9/8/22     Patient will increase functional independence with ADLs by performing:    UE Dressing with Modified [place].  LE Dressing with Modified [place].  Toileting from bedside commode with Modified [place] for hygiene and clothing management.   Toilet transfer to bedside commode with Modified [place].                     History:     Past Medical History:   Diagnosis Date    Arthritis     Chronic ulcer of ankle 05/26/2022    Frequent UTI 07/02/2019    Generalized anxiety disorder 05/26/2022    Neurogenic bladder 05/26/2022    Osteomyelitis  05/26/2022    Presence of suprapubic catheter 05/26/2022    Pure hypercholesterolemia 05/26/2022    Retention of urine, unspecified 08/09/2019    Spinal cord injury at T1-T6 level 04/20/2018       History reviewed. No pertinent surgical history.    Time Tracking:     OT Date of Treatment: 08/12/22  OT Start Time: 1034  OT Stop Time: 1101  OT Total Time (min): 27 min    Billable Minutes:Evaluation mod complexity 27 min    8/12/2022

## 2022-08-12 NOTE — PT/OT/SLP EVAL
Physical Therapy Evaluation    Patient Name:  Bianca Khan   MRN:  [id number]    Recommendations:     Discharge Recommendations:  rehabilitation facility, home with home health   Discharge Equipment Recommendations: none   Barriers to discharge: Decreased caregiver support    Assessment:     Bianca Khan is a 57 y.o. male admitted with a medical diagnosis of Closed displaced spiral fracture of shaft of right tibia.  He presents with the following impairments/functional limitations:  weakness, impaired endurance, impaired balance, impaired functional mobility. The pt has recently been discharged from inpt rehab. He states that now, he is unable to complete mobility tasks with complete indepence as he did before. His wife is unable to provide any assistance, 2/2 recent stroke. Pt would be benefit from inpt rehab at discharge. Will progress as tolerated.    Rehab Prognosis: Good; patient would benefit from acute skilled PT services to address these deficits and reach maximum level of function.    Recent Surgery: Procedure(s) (LRB):  INSERTION, INTRAMEDULLARY MARISA RIGHT TIBIA (Right) 2 Days Post-Op    Plan:     During this hospitalization, patient to be seen 5 x/week to address the identified rehab impairments via therapeutic activities, therapeutic exercises and progress toward the following goals:    · Plan of Care Expires:  09/02/22    Subjective     Chief Complaint: none  Patient/Family Comments/goals: return to PLOF  Pain/Comfort:  ·      Patients cultural, spiritual, Mormonism conflicts given the current situation:      Living Environment:  Home with wife.  Prior to admission, patients level of function was independent.  Equipment used at home: wheelchair, 3-in-1 commode, power chair.  DME owned (not currently used): none.  Upon discharge, patient will have assistance from none.    Objective:     Communicated with NSG prior to session.  Patient found in power chair with    upon PT entry to room.    General  Precautions: Standard, fall   Orthopedic Precautions:RLE non weight bearing   Braces: N/A  Respiratory Status: Room air    Exams:  · RLE ROM: no AROM  · RLE Strength: 0/5  · LLE ROM: no AROM  · LLE Strength: 0/5    Functional Mobility:  · Bed Mobility:     · Scooting: minimum assistance  · Supine to Sit: minimum assistance  · Transfers:     · Bed to Chair: minimum assistance with  no AD  using  scoot transfer    Patient left HOB elevated with all lines intact, call button in reach and NSG and CM notified.    GOALS:   Multidisciplinary Problems     Physical Therapy Goals        Problem: Physical Therapy    Goal Priority Disciplines Outcome Goal Variances Interventions   Physical Therapy Goal     PT, PT/OT Ongoing, Progressing     Description: Goals to be met by: 22     Patient will increase functional independence with mobility by performin. Supine to sit with Modified [place]  2. Sit to supine with Modified [place]  3. Rolling to Left and Right with Modified [place].  4. Bed to chair transfer with Modified [place] using No Assistive Device                     History:     Past Medical History:   Diagnosis Date    Arthritis     Chronic ulcer of ankle 2022    Frequent UTI 2019    Generalized anxiety disorder 2022    Neurogenic bladder 2022    Osteomyelitis 2022    Presence of suprapubic catheter 2022    Pure hypercholesterolemia 2022    Retention of urine, unspecified 2019    Spinal cord injury at T1-T6 level 2018       History reviewed. No pertinent surgical history.    Time Tracking:     PT Received On: 22  PT Start Time: 1035     PT Stop Time: 1103  PT Total Time (min): 28 min     Billable Minutes: Evaluation 15 Education 13      2022

## 2022-08-12 NOTE — PLAN OF CARE
Problem: Occupational Therapy  Goal: Occupational Therapy Goal  Description: Goals to be met by: 9/8/22     Patient will increase functional independence with ADLs by performing:    UE Dressing with Modified [place].  LE Dressing with Modified [place].  Toileting from bedside commode with Modified [place] for hygiene and clothing management.   Toilet transfer to bedside commode with Modified [place].    Outcome: Ongoing, Progressing

## 2022-08-12 NOTE — PLAN OF CARE
Mere with [facility] stated they are not able to accept pt d/t he is currently same level of function when he left them 1.5 weeks ago.  Pt does have [facility], I will send clinical updates via [employer].  Faxed [phone] to Reena order for air matress.  Pt had air matress in past and [facility] picked up when pt could not pay the co-pay. Pt is requesting air matress again.

## 2022-08-12 NOTE — PLAN OF CARE
Problem: Adult Inpatient Plan of Care  Goal: Plan of Care Review  Outcome: Ongoing, Progressing  Goal: Patient-Specific Goal (Individualized)  Outcome: Ongoing, Progressing  Goal: Absence of Hospital-Acquired Illness or Injury  Outcome: Ongoing, Progressing  Goal: Optimal Comfort and Wellbeing  Outcome: Ongoing, Progressing  Goal: Readiness for Transition of Care  Outcome: Ongoing, Progressing     Problem: Skin Injury Risk Increased  Goal: Skin Health and Integrity  Outcome: Ongoing, Progressing     Problem: Diabetes Comorbidity  Goal: Blood Glucose Level Within Targeted Range  Outcome: Ongoing, Progressing     Problem: Fall Injury Risk  Goal: Absence of Fall and Fall-Related Injury  Outcome: Ongoing, Progressing     Problem: Impaired Wound Healing  Goal: Optimal Wound Healing  Outcome: Ongoing, Progressing

## 2022-08-12 NOTE — PLAN OF CARE
Problem: Physical Therapy  Goal: Physical Therapy Goal  Description: Goals to be met by: 22     Patient will increase functional independence with mobility by performin. Supine to sit with Modified [place]  2. Sit to supine with Modified [place]  3. Rolling to Left and Right with Modified [place].  4. Bed to chair transfer with Modified [place] using No Assistive Device    Outcome: Ongoing, Progressing

## 2022-08-12 NOTE — PROGRESS NOTES
[facility] - 8th Floor Med Surg  Orthopedics  Progress Note    Patient Name: Bianca Khan  MRN: [id number]  Admission Date: 8/9/2022  Hospital Length of Stay: 3 days  Attending Provider: Haven Pringle DO  Primary Care Provider: SWAPNIL Garcias  Follow-up For: Procedure(s) (LRB):  INSERTION, INTRAMEDULLARY MARISA RIGHT TIBIA (Right)    Post-Operative Day: 2 Day Post-Op  Subjective:     Principal Problem:Closed displaced spiral fracture of shaft of right tibia    Principal Orthopedic Problem: same    Interval History: POD 2 IMN R tibia fracture. No acute events since OR. Pt states that he had a headache yesterday and muscle spasm to his leg. Both are improved this morning. Nursing staff denies any issues and states planning for dc home today.     Review of patient's allergies indicates:   Allergen Reactions    Baclofen Itching and Anxiety       Current Facility-Administered Medications   Medication    acetaminophen tablet 1,000 mg    amLODIPine tablet 10 mg    atorvastatin tablet 20 mg    busPIRone tablet 5 mg    butalbital-acetaminophen-caffeine -40 mg per tablet 2 tablet    carvediloL tablet 25 mg    enoxaparin injection 30 mg    gabapentin capsule 300 mg    HYDROcodone-acetaminophen 5-325 mg per tablet 1 tablet    HYDROmorphone (PF) injection 0.4 mg    LORazepam tablet 1 mg    methocarbamoL tablet 750 mg    morphine injection 2 mg    ondansetron injection 4 mg    oxyCODONE-acetaminophen  mg per tablet 1 tablet    sodium chloride 0.9% flush 10 mL    sodium chloride 0.9% flush 10 mL    traZODone tablet 50 mg     Objective:     Vital Signs (Most Recent):  Temp: 97.7 °F (36.5 °C) (08/12/22 0439)  Pulse: 85 (08/12/22 0439)  Resp: 18 (08/12/22 0446)  BP: 122/71 (08/12/22 0439)  SpO2: 96 % (08/12/22 0554) Vital Signs (24h Range):  Temp:  [97.6 °F (36.4 °C)-98.4 °F (36.9 °C)] 97.7 °F (36.5 °C)  Pulse:  [78-91] 85  Resp:  [16-18] 18  SpO2:  [95 %-98 %] 96 %  BP:  (105-144)/(65-89) 122/71           There is no height or weight on file to calculate BMI.      Intake/Output Summary (Last 24 hours) at 8/12/2022 0709  Last data filed at 8/12/2022 0600  Gross per 24 hour   Intake 960 ml   Output 1900 ml   Net -940 ml       Ortho/SPM Exam   General: AAO. Well nourished, well perfused, no distress.   R LE:  Dressing clean, dry, intact; removed for exam. Incisions are clean, dry, intact with no erythema or drainage. Mepilex intact to right lateral ankle ulcer.  Compartments soft and compressible  Calf supple  Tolerates gentle passive ROM of knee  Brisk capillary refill distally  No motor function    Significant Labs:   Recent Lab Results     None        All pertinent labs within the past 24 hours have been reviewed.    Significant Imaging: X-Ray: I have reviewed all pertinent results/findings and my personal findings are:  post op xray right tibia demonstrates good fracture alignment with all hardware intact    Assessment/Plan:     Active Diagnoses:    Diagnosis Date Noted POA    PRINCIPAL PROBLEM:  Closed displaced spiral fracture of shaft of right tibia [S82.241A] 08/10/2022 Yes    Chronic ulcer of ankle [L97.309] 05/26/2022 Yes     Chronic      Problems Resolved During this Admission:      Pt is doing well s/p IMN R tibia.He is NWB at baseline due to his paraplegia. Ok for ROM and positioning of the limb as tolerated. Ok for dc home from ortho stand point. Begin daily dry dressing changes to incisions today. Wound care nurse was consulted to eval/treat right lateral ankle ulcer. On lovenox for dvt ppx. Follow up with Dr. Orellana in 2 weeks for wound check and staple removal. Please call with any questions or concerns.    The above findings, diagnosis, and treatment plan were discussed with Dr. Jorge Orellana who is in agreement.    Fidelia eWston, SWAPNIL  Orthopedics  [facility] - 8th Floor Med Surg

## 2022-08-15 ENCOUNTER — PATIENT OUTREACH (OUTPATIENT)
Dept: ADMINISTRATIVE | Facility: CLINIC | Age: 57
End: 2022-08-15
Payer: MEDICARE

## 2022-08-15 NOTE — PROGRESS NOTES
C3 nurse attempted to contact *Bianca Khan for a TCC post hospital discharge follow up call. No answer. Left voicemail with callback information. The patient has a scheduled HOSFU appointment with *SWAPNIL Garcias on 08/30/2022 @ 07:30 am.

## 2022-08-16 NOTE — PROGRESS NOTES
C3 nurse spoke with Antonio Khan for a TCC post hospital discharge follow up call. The patient has a scheduled [facility] appointment with Dr Jorge Orellana Orthopedic Surgeon on 08/25/2022 @ 02:00pm.    Patient also has an appointment with Sherry KRAFT as a new patient on 08/30/2022 @ 07:30 am.

## 2022-08-17 NOTE — DISCHARGE SUMMARY
[facility]  HOSPITAL MEDICINE   DISCHARGE SUMMARY    Patient Name: Bianca Khan  MRN: [id number]  Admission Date: 8/9/2022  Hospital Length of Stay: 3 days  Discharge Date and Time: 8/12  Discharge Provider: Michele Pagan  Primary Care Provider: Sherry Cassidy [facility]      HOSPITAL COURSE  57 y.o. male who  has a past medical history of Arthritis, Chronic ulcer of ankle (05/26/2022), Frequent UTI (07/02/2019), Generalized anxiety disorder (05/26/2022), Neurogenic bladder (05/26/2022), Osteomyelitis (05/26/2022), Presence of suprapubic catheter (05/26/2022), Pure hypercholesterolemia (05/26/2022), Retention of urine, unspecified (08/09/2019), and Spinal cord injury at T1-T6 level (04/20/2018).. The patient presented to [facility] on 8/9/2022 with a primary complaint of right foot pain, he tells me that his foot was how and the door hit it and he heard a crack and felt pain, he came into the ED and is noted to have fractures of the tibia and fibula.  He is a paraplegic with suprapubic catheter, very aware of his medical problems; he has home health at home and resides with is wife.   He asked could he go home, stating that he was unaware that he had to stay. However he did speak with ortho team and has consented to surgery in the morning.  On August 10 he underwent intramedullary nailing of the right tibia.  He was dishcarged home with home health. Did not qualify for rehab.        PHYSICAL EXAM  /81   Pulse 85   Temp 98.4 °F (36.9 °C) (Oral)   Resp 18   SpO2 96%    General: In no acute distress, afebrile  Chest: Clear to auscultation bilaterally  Heart: S1, S2, no appreciable murmur  Abdomen: Soft, nontender, BS +  MSK: Warm, no lower extremity edema, no clubbing or cyanosis  Neurologic: Alert and oriented x4          DISCHARGE DIAGNOSIS  Closed displaced spiral fracture right tibia shaft-status post intramedullary nailing  Right lateral ankle chronic ulcer POA  Generalized anxiety  disorder  Neurogenic bladder   History of Osteomyelitis   Presence of suprapubic catheter  Pure hypercholesterolemia   Spinal cord injury at T1-T6 level         _____________________________________________________________________________      DISCHARGE MEDICATION RECONCILIATION  Discharge Medication List as of 8/12/2022  3:08 PM      CONTINUE these medications which have NOT CHANGED    Details   amLODIPine (NORVASC) 10 MG tablet 1 tablet, Historical Med      busPIRone (BUSPAR) 5 MG Tab 1 tablet, Historical Med      gabapentin (NEURONTIN) 300 MG capsule 1 capsule, Historical Med      linaGLIPtin (TRADJENTA) 5 mg Tab tablet Take 5 mg by mouth., Starting Tue 10/5/2021, Historical Med      lisinopriL (PRINIVIL,ZESTRIL) 40 MG tablet 1 tablet, Historical Med      tiZANidine 4 mg Cap Take 4 mg by mouth 2 (two) times daily as needed., Starting Tue 10/5/2021, Historical Med      traZODone (DESYREL) 50 MG tablet Take 50 mg by mouth nightly., Starting Tue 11/23/2021, Historical Med      atorvastatin (LIPITOR) 20 MG tablet Take 20 mg by mouth nightly., Starting Mon 8/1/2022, Historical Med      carvediloL (COREG) 25 MG tablet 1 tablet with food, Historical Med      doxycycline (VIBRAMYCIN) 100 MG Cap Take 100 mg by mouth once daily., Starting Mon 8/1/2022, Historical Med      FLUoxetine 10 MG capsule 1 capsule, Historical Med      gentamicin (GARAMYCIN) 0.1 % cream APPLY TO THE AFFECTED AREA(S) TOPICALLY ONCE DAILY FOR 30 DAYS AS DIRECTED, Historical Med      ibuprofen (ADVIL,MOTRIN) 600 MG tablet TAKE ONE TABLET TWICE DAILY AFTER MEALS FOR PAIN, Historical Med      insulin regular 100 unit/mL Inj injection Inject into the skin 3 (three) times daily as needed., Historical Med      isosorbide dinitrate (ISORDIL) 20 MG tablet Take 20 mg by mouth once daily., Starting Mon 8/1/2022, Historical Med      isosorbide mononitrate (ISMO,MONOKET) 20 MG Tab Take 40 mg by mouth nightly., Starting Tue 6/7/2022, Historical Med      LORazepam  (ATIVAN) 1 MG tablet Take 1 mg by mouth 2 (two) times daily., Starting 2022, Historical Med      meloxicam (MOBIC) 15 MG tablet Take 15 mg by mouth once daily., Starting 2022, Historical Med      methocarbamoL (ROBAXIN) 500 MG Tab TAKE ONE TABLET BY MOUTH ONCE DAILY AND AND TAKE ONE TABLET BY MOUTH ONCE DAILY AT BEDTIME FOR MUSCLE SPASMS, Historical Med      nitrofurantoin, macrocrystal-monohydrate, (MACROBID) 100 MG capsule Take 100 mg by mouth., Historical Med      oxybutynin (DITROPAN) 5 MG Tab Take 5 mg by mouth 2 (two) times daily., Starting 2022, Historical Med      oxyCODONE-acetaminophen (PERCOCET)  mg per tablet Take 1 tablet by mouth every 6 (six) hours as needed., Starting Thu 2022, Historical Med      pantoprazole (PROTONIX) 40 MG tablet 1 tablet, Historical Med      temazepam (RESTORIL) 30 mg capsule Take 30 mg by mouth nightly., Starting 2022, Historical Med      XARELTO 20 mg Tab SMARTSI Tablet(s) By Mouth Every Evening, Historical Med                CONSULTS        FOLLOW UP   Follow-up Information     Jorge Orellana MD Follow up in 2 week(s).    Specialty: Orthopedic Surgery  Why: For suture removal, For wound re-check  Contact information:  [address] [facility] [address] [phone]             SWAPNIL Garcias Follow up in 1 week(s).    Specialty: Nurse Practitioner  Contact information:  [address] [facility] [address] [phone]             [facility] Follow up.    Why: This is your current home health provider.  Contact information:  [address] [facility] [id number] [phone]                             DISCHARGE INSTRUCTIONS  Explained in detail to the patient about the discharge plan, medications, and follow-up visits. Pt understands and agrees with the treatment plan.  Discharged Condition: stable  Diet as tolerated  Activities as tolerated  Discharge to: home    TIME  SPENT ON DISCHARGE  35 minutes        Michele Singletary M.D, on 8/17/2022  Internal Medicine  Department of [facility] Medicine    This document was created using electronic dictation services.  Please excuse any errors that may have been made.  Contact me if any questions regarding documentation to clarify verbiage.

## 2022-08-25 ENCOUNTER — OFFICE VISIT (OUTPATIENT)
Dept: ORTHOPEDICS | Facility: CLINIC | Age: 57
End: 2022-08-25
Payer: MEDICARE

## 2022-08-25 VITALS
SYSTOLIC BLOOD PRESSURE: 138 MMHG | RESPIRATION RATE: 18 BRPM | BODY MASS INDEX: 27.21 KG/M2 | DIASTOLIC BLOOD PRESSURE: 81 MMHG | HEIGHT: 70 IN | HEART RATE: 85 BPM | WEIGHT: 190.06 LBS

## 2022-08-25 DIAGNOSIS — S82.241D CLOSED DISPLACED SPIRAL FRACTURE OF SHAFT OF RIGHT TIBIA WITH ROUTINE HEALING, SUBSEQUENT ENCOUNTER: Primary | ICD-10-CM

## 2022-08-25 PROCEDURE — 99024 POSTOP FOLLOW-UP VISIT: CPT | Mod: POP,,, | Performed by: ORTHOPAEDIC SURGERY

## 2022-08-25 PROCEDURE — 99024 PR POST-OP FOLLOW-UP VISIT: ICD-10-PCS | Mod: POP,,, | Performed by: ORTHOPAEDIC SURGERY

## 2022-08-25 NOTE — PROGRESS NOTES
Subjective:       Patient ID: Bianca Khan is a 57 y.o. male.    Chief Complaint   Patient presents with    Right Lower Leg - Post-op Evaluation     2 WEEK F/U IMN RIGHT TIBIA FX, WOUND CHECK        Patient is here today for follow-up evaluation 2 weeks status post intramedullary nailing of right tibia.  He states that he has been doing well.  He has had minimal swelling in the limb.  He has been at his baseline for activities based on his paraplegia.  His incisions have been clean and dry.  He continues to get wound care for chronic pressure sore to the lateral aspect of the ankle which is remote from his surgical sites.      Review of Systems   Constitutional: Negative for chills, fever and malaise/fatigue.   HENT: Negative for congestion and hearing loss.    Eyes: Negative for visual disturbance.   Cardiovascular: Negative for chest pain and syncope.   Respiratory: Negative for cough and shortness of breath.    Hematologic/Lymphatic: Does not bruise/bleed easily.   Skin: Negative for color change and suspicious lesions.   Musculoskeletal: Negative for falls and neck pain.   Gastrointestinal: Negative for abdominal pain, nausea and vomiting.   Genitourinary: Negative for dysuria and hematuria.   Neurological: Negative for numbness and sensory change.   Psychiatric/Behavioral: Negative for altered mental status. The patient is not nervous/anxious.         Current Outpatient Medications on File Prior to Visit   Medication Sig Dispense Refill    amLODIPine (NORVASC) 10 MG tablet 1 tablet      atorvastatin (LIPITOR) 20 MG tablet Take 20 mg by mouth nightly.      busPIRone (BUSPAR) 5 MG Tab 1 tablet      carvediloL (COREG) 25 MG tablet 1 tablet with food      doxycycline (VIBRAMYCIN) 100 MG Cap Take 100 mg by mouth once daily.      FLUoxetine 10 MG capsule 1 capsule      gabapentin (NEURONTIN) 300 MG capsule 1 capsule      gentamicin (GARAMYCIN) 0.1 % cream APPLY TO THE AFFECTED AREA(S) TOPICALLY ONCE DAILY  "FOR 30 DAYS AS DIRECTED      ibuprofen (ADVIL,MOTRIN) 600 MG tablet TAKE ONE TABLET TWICE DAILY AFTER MEALS FOR PAIN      insulin regular 100 unit/mL Inj injection Inject into the skin 3 (three) times daily as needed.      isosorbide dinitrate (ISORDIL) 20 MG tablet Take 20 mg by mouth once daily.      isosorbide mononitrate (ISMO,MONOKET) 20 MG Tab Take 40 mg by mouth nightly.      linaGLIPtin (TRADJENTA) 5 mg Tab tablet Take 5 mg by mouth.      lisinopriL (PRINIVIL,ZESTRIL) 40 MG tablet 1 tablet      LORazepam (ATIVAN) 1 MG tablet Take 1 mg by mouth 2 (two) times daily.      methocarbamoL (ROBAXIN) 500 MG Tab TAKE ONE TABLET BY MOUTH ONCE DAILY AND AND TAKE ONE TABLET BY MOUTH ONCE DAILY AT BEDTIME FOR MUSCLE SPASMS      nitrofurantoin, macrocrystal-monohydrate, (MACROBID) 100 MG capsule Take 100 mg by mouth.      oxybutynin (DITROPAN) 5 MG Tab Take 5 mg by mouth 2 (two) times daily.      oxyCODONE-acetaminophen (PERCOCET)  mg per tablet Take 1 tablet by mouth every 6 (six) hours as needed.      pantoprazole (PROTONIX) 40 MG tablet 1 tablet      temazepam (RESTORIL) 30 mg capsule Take 30 mg by mouth nightly.      tiZANidine 4 mg Cap Take 4 mg by mouth 2 (two) times daily as needed.      traZODone (DESYREL) 50 MG tablet Take 50 mg by mouth nightly.      XARELTO 20 mg Tab SMARTSI Tablet(s) By Mouth Every Evening      meloxicam (MOBIC) 15 MG tablet Take 15 mg by mouth once daily.       No current facility-administered medications on file prior to visit.          Objective:      /81   Pulse 85   Resp 18   Ht 5' 10" (1.778 m)   Wt 86.2 kg (190 lb 0.6 oz)   BMI 27.27 kg/m²   Physical Exam  Musculoskeletal:      Comments: Right lower extremity:  Surgical incisions are well healed.  Staples are removed today.  No calf swelling, no signs of DVT.  He has sensation limb but no active motor function.        Body mass index is 27.27 kg/m².    Radiology:         Assessment:         1. Closed " displaced spiral fracture of shaft of right tibia with routine healing, subsequent encounter             Plan:       He is doing very well today following fixation of his tibia shaft fracture.  He can continue activities as tolerated.  Continue local wound care and monitoring of his incision sites.  Follow-up in 4 weeks for repeat x-rays of the right tibia.  The patient was wife understand and agree with our plan all questions and concerns were addressed.    Jorge Orellana MD  Orthopedic Trauma  [facility]      Follow up in about 4 weeks (around 9/22/2022).    Closed displaced spiral fracture of shaft of right tibia with routine healing, subsequent encounter              No orders of the defined types were placed in this encounter.      Future Appointments   Date Time Provider Department Center   8/30/2022  7:30 AM SWAPNIL Garcias [facility]

## 2022-08-28 ENCOUNTER — NURSE TRIAGE (OUTPATIENT)
Dept: ADMINISTRATIVE | Facility: CLINIC | Age: 57
End: 2022-08-28
Payer: MEDICARE

## 2022-08-28 NOTE — TELEPHONE ENCOUNTER
Pt c/o headaches for the last week. /82. Also c./o severe pain to right eye. Care advice to be seen in ER. Verbalized understanding.        Reason for Disposition   Severe pain in one eye    Additional Information   Negative: Difficult to awaken or acting confused  (e.g., disoriented, slurred speech)   Negative: [1] Weakness of the face, arm or leg on one side of the body AND [2] new onset   Negative: [1] Numbness of the face, arm or leg on one side of the body AND [2] new onset   Negative: [1] Loss of speech or garbled speech AND [2] new onset   Negative: Passed out (i.e., lost consciousness, collapsed and was not responding)   Negative: Sounds like a life-threatening emergency to the triager   Negative: Unable to walk, or can only walk with assistance (e.g., requires support)   Negative: Stiff neck (can't touch chin to chest)    Protocols used: Headache-A-AH

## 2022-08-29 ENCOUNTER — HOSPITAL ENCOUNTER (EMERGENCY)
Facility: HOSPITAL | Age: 57
Discharge: HOME | End: 2022-08-29
Attending: FAMILY MEDICINE
Payer: MEDICARE

## 2022-08-29 VITALS
OXYGEN SATURATION: 95 % | DIASTOLIC BLOOD PRESSURE: 68 MMHG | SYSTOLIC BLOOD PRESSURE: 145 MMHG | RESPIRATION RATE: 20 BRPM | TEMPERATURE: 98 F | HEART RATE: 61 BPM

## 2022-08-29 DIAGNOSIS — G43.019 INTRACTABLE MIGRAINE WITHOUT AURA AND WITHOUT STATUS MIGRAINOSUS: Primary | ICD-10-CM

## 2022-08-29 LAB
ANION GAP SERPL CALC-SCNC: 9 MEQ/L
BASOPHILS # BLD AUTO: 0.04 X10(3)/MCL (ref 0–0.2)
BASOPHILS NFR BLD AUTO: 0.5 %
BUN SERPL-MCNC: 9.2 MG/DL (ref 8.4–25.7)
CALCIUM SERPL-MCNC: 9.7 MG/DL (ref 8.4–10.2)
CHLORIDE SERPL-SCNC: 102 MMOL/L (ref 98–107)
CO2 SERPL-SCNC: 29 MMOL/L (ref 22–29)
CREAT SERPL-MCNC: 0.82 MG/DL (ref 0.73–1.18)
CREAT/UREA NIT SERPL: 11
EOSINOPHIL # BLD AUTO: 0.08 X10(3)/MCL (ref 0–0.9)
EOSINOPHIL NFR BLD AUTO: 1 %
ERYTHROCYTE [DISTWIDTH] IN BLOOD BY AUTOMATED COUNT: 15.6 % (ref 11.5–17)
GFR SERPLBLD CREATININE-BSD FMLA CKD-EPI: >60 MLS/MIN/1.73/M2
GLUCOSE SERPL-MCNC: 77 MG/DL (ref 74–100)
HCT VFR BLD AUTO: 38.6 % (ref 42–52)
HGB BLD-MCNC: 11.8 GM/DL (ref 14–18)
IMM GRANULOCYTES # BLD AUTO: 0.04 X10(3)/MCL (ref 0–0.04)
IMM GRANULOCYTES NFR BLD AUTO: 0.5 %
LYMPHOCYTES # BLD AUTO: 3.74 X10(3)/MCL (ref 0.6–4.6)
LYMPHOCYTES NFR BLD AUTO: 47.4 %
MCH RBC QN AUTO: 27.5 PG (ref 27–31)
MCHC RBC AUTO-ENTMCNC: 30.6 MG/DL (ref 33–36)
MCV RBC AUTO: 90 FL (ref 80–94)
MONOCYTES # BLD AUTO: 0.66 X10(3)/MCL (ref 0.1–1.3)
MONOCYTES NFR BLD AUTO: 8.4 %
NEUTROPHILS # BLD AUTO: 3.3 X10(3)/MCL (ref 2.1–9.2)
NEUTROPHILS NFR BLD AUTO: 42.2 %
NRBC BLD AUTO-RTO: 0 %
PLATELET # BLD AUTO: 510 X10(3)/MCL (ref 130–400)
PMV BLD AUTO: 9.3 FL (ref 7.4–10.4)
POCT GLUCOSE: 77 MG/DL (ref 70–110)
POTASSIUM SERPL-SCNC: 4.4 MMOL/L (ref 3.5–5.1)
RBC # BLD AUTO: 4.29 X10(6)/MCL (ref 4.7–6.1)
SODIUM SERPL-SCNC: 140 MMOL/L (ref 136–145)
WBC # SPEC AUTO: 7.9 X10(3)/MCL (ref 4.5–11.5)

## 2022-08-29 PROCEDURE — 96365 THER/PROPH/DIAG IV INF INIT: CPT

## 2022-08-29 PROCEDURE — 80048 BASIC METABOLIC PNL TOTAL CA: CPT | Performed by: PHYSICIAN ASSISTANT

## 2022-08-29 PROCEDURE — 99285 EMERGENCY DEPT VISIT HI MDM: CPT | Mod: 25

## 2022-08-29 PROCEDURE — 25000003 PHARM REV CODE 250: Performed by: PHYSICIAN ASSISTANT

## 2022-08-29 PROCEDURE — 96361 HYDRATE IV INFUSION ADD-ON: CPT

## 2022-08-29 PROCEDURE — 85025 COMPLETE CBC W/AUTO DIFF WBC: CPT | Performed by: PHYSICIAN ASSISTANT

## 2022-08-29 PROCEDURE — 96375 TX/PRO/DX INJ NEW DRUG ADDON: CPT

## 2022-08-29 PROCEDURE — 82962 GLUCOSE BLOOD TEST: CPT

## 2022-08-29 PROCEDURE — 63600175 PHARM REV CODE 636 W HCPCS: Performed by: PHYSICIAN ASSISTANT

## 2022-08-29 PROCEDURE — 36415 COLL VENOUS BLD VENIPUNCTURE: CPT | Performed by: PHYSICIAN ASSISTANT

## 2022-08-29 RX ORDER — KETOROLAC TROMETHAMINE 30 MG/ML
15 INJECTION, SOLUTION INTRAMUSCULAR; INTRAVENOUS
Status: COMPLETED | OUTPATIENT
Start: 2022-08-29 | End: 2022-08-29

## 2022-08-29 RX ORDER — DIPHENHYDRAMINE HYDROCHLORIDE 50 MG/ML
25 INJECTION INTRAMUSCULAR; INTRAVENOUS
Status: COMPLETED | OUTPATIENT
Start: 2022-08-29 | End: 2022-08-29

## 2022-08-29 RX ADMIN — SODIUM CHLORIDE, POTASSIUM CHLORIDE, SODIUM LACTATE AND CALCIUM CHLORIDE 500 ML: 600; 310; 30; 20 INJECTION, SOLUTION INTRAVENOUS at 06:08

## 2022-08-29 RX ADMIN — DIPHENHYDRAMINE HYDROCHLORIDE 25 MG: 50 INJECTION, SOLUTION INTRAMUSCULAR; INTRAVENOUS at 07:08

## 2022-08-29 RX ADMIN — PROCHLORPERAZINE EDISYLATE 10 MG: 5 INJECTION INTRAMUSCULAR; INTRAVENOUS at 07:08

## 2022-08-29 RX ADMIN — KETOROLAC TROMETHAMINE 15 MG: 30 INJECTION, SOLUTION INTRAMUSCULAR; INTRAVENOUS at 05:08

## 2022-08-29 NOTE — ED PROVIDER NOTES
Encounter Date: 8/29/2022       History     Chief Complaint   Patient presents with    Headache     Pt presents via [facility] with c/o HA x 12 days unrelieved by home meds. Pt bed bound with urinary cath in place on arrival     Bianca Khan is a 57 y.o. bed bound male with a PMHx of arthritis, neurogenic bladder with chronic suprapubic catheter in place, spinal cord injury who presents to the ED complaining of headache. He reports a severe, throbbing pain located behind his right eye. Pain is worse with light and noise, improves with cold compresses. He reports taking OTC migraine medication with relief, but subsequent return of pain. He denies any head trauma, falls, vision changes, neck pain.     The history is provided by the patient.   Headache   This is a new problem. The current episode started 1 to 4 weeks ago. The problem occurs intermittently. The problem has been waxing and waning. The pain is located in the Right unilateral region. Associated symptoms include photophobia. Pertinent negatives include no abdominal pain, back pain, dizziness, fever, numbness, vomiting or weakness. The symptoms are aggravated by bright light. He has tried Excedrin and cold packs for the symptoms. The treatment provided mild relief.   Review of patient's allergies indicates:   Allergen Reactions    Baclofen Itching and Anxiety     Past Medical History:   Diagnosis Date    Arthritis     Chronic ulcer of ankle 05/26/2022    Frequent UTI 07/02/2019    Generalized anxiety disorder 05/26/2022    Neurogenic bladder 05/26/2022    Osteomyelitis 05/26/2022    Presence of suprapubic catheter 05/26/2022    Pure hypercholesterolemia 05/26/2022    Retention of urine, unspecified 08/09/2019    Spinal cord injury at T1-T6 level 04/20/2018     History reviewed. No pertinent surgical history.  History reviewed. No pertinent family history.  Social History     Tobacco Use    Smoking status: Never    Smokeless tobacco: Never     Review of Systems    Constitutional:  Negative for activity change, chills and fever.   HENT:  Negative for congestion and trouble swallowing.    Eyes:  Positive for photophobia. Negative for visual disturbance.   Respiratory:  Negative for chest tightness, shortness of breath and wheezing.    Cardiovascular:  Negative for chest pain, palpitations and leg swelling.   Gastrointestinal:  Negative for abdominal pain and vomiting.   Genitourinary:  Negative for dysuria, frequency, hematuria and urgency.   Musculoskeletal:  Negative for arthralgias, back pain and gait problem.   Skin:  Negative for rash.   Neurological:  Positive for headaches. Negative for dizziness, syncope, weakness, light-headedness and numbness.   Psychiatric/Behavioral: Negative.       Physical Exam     Initial Vitals [08/29/22 1629]   BP Pulse Resp Temp SpO2   133/73 64 20 98.2 °F (36.8 °C) 100 %      MAP       --         Physical Exam    Nursing note and vitals reviewed.  Constitutional: He appears well-developed. No distress.   HENT:   Head: Normocephalic and atraumatic.   Right Ear: External ear normal.   Left Ear: External ear normal.   Eyes: EOM are normal. No scleral icterus.   Neck: Neck supple.   Normal range of motion.  Cardiovascular:  Normal rate and regular rhythm.           No murmur heard.  Pulmonary/Chest: Breath sounds normal. No respiratory distress.   Abdominal: Abdomen is soft. He exhibits no distension. There is no abdominal tenderness. There is no rebound.   Genitourinary:    Genitourinary Comments: Stover draining clear yellow urine     Musculoskeletal:         General: No tenderness.      Cervical back: Normal range of motion and neck supple.      Comments: Heel boots in place     Neurological: He is alert and oriented to person, place, and time.   Skin: Skin is warm and dry. Capillary refill takes less than 2 seconds. No erythema.   Psychiatric: He has a normal mood and affect. His behavior is normal. Thought content normal.       ED Course    Procedures  Labs Reviewed   CBC WITH DIFFERENTIAL - Abnormal; Notable for the following components:       Result Value    RBC 4.29 (*)     Hgb 11.8 (*)     Hct 38.6 (*)     MCHC 30.6 (*)     Platelet 510 (*)     All other components within normal limits   CBC W/ AUTO DIFFERENTIAL    Narrative:     The following orders were created for panel order CBC auto differential.  Procedure                               Abnormality         Status                     ---------                               -----------         ------                     CBC with Differential[[id number]]        Abnormal            Final result                 Please view results for these tests on the individual orders.   BASIC METABOLIC PANEL   EXTRA TUBES    Narrative:     The following orders were created for panel order EXTRA TUBES.  Procedure                               Abnormality         Status                     ---------                               -----------         ------                     Light Blue Top Hold[[id number]]                              In process                 Gold Top Hold[[id number]]                                    In process                   Please view results for these tests on the individual orders.   LIGHT BLUE TOP HOLD   GOLD TOP HOLD   POCT GLUCOSE          Imaging Results              CT Head Without Contrast (Final result)  Result time 08/29/22 19:55:40      Final result by Konrad Robertson MD (08/29/22 19:55:40)                   Impression:      No acute intracranial abnormality identified.      Electronically signed by: Konrad Robertson  Date:    08/29/2022  Time:    19:55               Narrative:    EXAMINATION:  CT HEAD WITHOUT CONTRAST    CLINICAL HISTORY:  headache x2 weeks;    TECHNIQUE:  Low dose axial images were obtained through the head.  Coronal and sagittal reformations were also performed. Contrast was not administered.    Automatic exposure control was utilized to reduce the  patient's radiation dose.    DLP= 937    COMPARISON:  None.    FINDINGS:  No acute intracranial hemorrhage, edema or mass. No acute parenchymal abnormality.    There is no hydrocephalus, evidence of herniation or midline shift. The ventricles and sulci are normal.    There is normal gray white differentiation.    The osseous structures are normal.    The mastoid air cells are clear.    The auditory canals are patent bilaterally.    The globes and orbital contents are normal bilaterally.    The visualized maxillary, ethmoid and sphenoid sinuses are clear.                                       Medications   lactated ringers bolus 500 mL (0 mLs Intravenous Stopped 8/29/22 1911)   ketorolac injection 15 mg (15 mg Intravenous Given 8/29/22 1739)   diphenhydrAMINE injection 25 mg (25 mg Intravenous Given 8/29/22 1954)   prochlorperazine (COMPAZINE) 10 mg in sodium chloride 0.9% 50 mL IVPB (0 mg Intravenous Stopped 8/29/22 2014)                 ED Course as of 08/29/22 2140   Mon Aug 29, 2022   2138 Patient reports complete resolution of symptoms [AL]      ED Course User Index  [AL] PRIYA Barajas             Clinical Impression:   Final diagnoses:  [G43.019] Intractable migraine without aura and without status migrainosus (Primary)      ED Disposition Condition    Discharge Stable          ED Prescriptions    None       Follow-up Information       Follow up With Specialties Details Why Contact Info    SWAPNIL Garcias Nurse Practitioner   [address] [place] [id number] [phone]      discharge followup    If your symptoms become WORSE or you DO NOT IMPROVE and you are unable to reach your health care provider, you should RETURN to the emergency department    discharge info    Discussed all pertinent ED information, results, diagnosis and treatment plan; All questions and concerns were addressed at this time. Patient voices understanding of information and instructions. Patient is comfortable with  plan and discharge             PRIYA Barajas  08/29/22 5259

## 2022-09-08 DIAGNOSIS — G43.809 OTHER MIGRAINE WITHOUT STATUS MIGRAINOSUS, NOT INTRACTABLE: Primary | ICD-10-CM

## 2022-09-13 ENCOUNTER — OFFICE VISIT (OUTPATIENT)
Dept: NEUROLOGY | Facility: CLINIC | Age: 57
End: 2022-09-13
Payer: MEDICARE

## 2022-09-13 VITALS
BODY MASS INDEX: 27.2 KG/M2 | WEIGHT: 190 LBS | DIASTOLIC BLOOD PRESSURE: 82 MMHG | HEART RATE: 70 BPM | SYSTOLIC BLOOD PRESSURE: 136 MMHG | OXYGEN SATURATION: 99 % | HEIGHT: 70 IN

## 2022-09-13 DIAGNOSIS — G43.809 OTHER MIGRAINE WITHOUT STATUS MIGRAINOSUS, NOT INTRACTABLE: ICD-10-CM

## 2022-09-13 DIAGNOSIS — R51.9 ACUTE INTRACTABLE HEADACHE, UNSPECIFIED HEADACHE TYPE: Primary | ICD-10-CM

## 2022-09-13 PROCEDURE — 99999 PR PBB SHADOW E&M-EST. PATIENT-LVL V: CPT | Mod: PBBFAC,,, | Performed by: SPECIALIST

## 2022-09-13 PROCEDURE — 99215 OFFICE O/P EST HI 40 MIN: CPT | Mod: PBBFAC | Performed by: SPECIALIST

## 2022-09-13 PROCEDURE — 99999 PR PBB SHADOW E&M-EST. PATIENT-LVL V: ICD-10-PCS | Mod: PBBFAC,,, | Performed by: SPECIALIST

## 2022-09-13 PROCEDURE — 99205 PR OFFICE/OUTPT VISIT, NEW, LEVL V, 60-74 MIN: ICD-10-PCS | Mod: S$PBB,,, | Performed by: SPECIALIST

## 2022-09-13 PROCEDURE — 99205 OFFICE O/P NEW HI 60 MIN: CPT | Mod: S$PBB,,, | Performed by: SPECIALIST

## 2022-09-13 RX ORDER — PREDNISONE 10 MG/1
TABLET ORAL
Qty: 15 TABLET | Refills: 0 | Status: SHIPPED | OUTPATIENT
Start: 2022-09-13 | End: 2023-06-28

## 2022-09-13 RX ORDER — TALC
POWDER (GRAM) TOPICAL
Status: ON HOLD | COMMUNITY
Start: 2022-04-26 | End: 2023-08-09 | Stop reason: HOSPADM

## 2022-09-13 RX ORDER — CARVEDILOL 12.5 MG/1
12.5 TABLET ORAL
Status: ON HOLD | COMMUNITY
Start: 2021-10-22 | End: 2023-08-09 | Stop reason: HOSPADM

## 2022-09-13 NOTE — PROGRESS NOTES
Subjective:       Patient ID: Bianca Khan is a 57 y.o. male.    Chief Complaint: HA    HPI:            NP ref. by Areli HART for migraines    (Pt c/o to clinic w/ migraines.   Pt states migraines take place behind his rt eye and rt side of temple.   Pt states when he has a migraine the pain level is at a 10.   Pt claims migraines have taken place for 25 days, pt also states migraines started after his recent knee surgery.   Pain is pulsating and steady, medication has no effect. -lrb)    He describes that a recent antihypertensive caused him some HAs prior to surgery 'but not a migraine'  He stopped the med and the HA improved     These HAs he describes as migraine and new since surgery       notes may also be on facesheet for HPI, ROS, and other sections     Review of Systems  No postural component to HA   Tried butalbital wo help   Taking Stanback 6/d AND he's on Xarelto         Social History     Socioeconomic History    Marital status:    Tobacco Use    Smoking status: Every Day     Types: Cigars, Cigarettes    Smokeless tobacco: Never   Substance and Sexual Activity    Alcohol use: Yes     Alcohol/week: 2.0 standard drinks     Types: 2 Cans of beer per week    Sexual activity: Never     ----------------------------  Arthritis  Chronic ulcer of ankle  Frequent UTI  Generalized anxiety disorder  Neurogenic bladder  Osteomyelitis  Presence of suprapubic catheter  Pure hypercholesterolemia  Retention of urine, unspecified  Spinal cord injury at T1-T6 level      Current Outpatient Medications:     amLODIPine (NORVASC) 10 MG tablet, 1 tablet, Disp: , Rfl:     atorvastatin (LIPITOR) 20 MG tablet, Take 20 mg by mouth nightly., Disp: , Rfl:     busPIRone (BUSPAR) 5 MG Tab, 1 tablet, Disp: , Rfl:     carvediloL (COREG) 12.5 MG tablet, Take 12.5 mg by mouth., Disp: , Rfl:     doxycycline (VIBRAMYCIN) 100 MG Cap, Take 100 mg by mouth once daily., Disp: , Rfl:     gabapentin (NEURONTIN) 300 MG capsule, 1  capsule, Disp: , Rfl:     gentamicin (GARAMYCIN) 0.1 % cream, APPLY TO THE AFFECTED AREA(S) TOPICALLY ONCE DAILY FOR 30 DAYS AS DIRECTED, Disp: , Rfl:     ibuprofen (ADVIL,MOTRIN) 600 MG tablet, TAKE ONE TABLET TWICE DAILY AFTER MEALS FOR PAIN, Disp: , Rfl:     insulin regular 100 unit/mL Inj injection, Inject into the skin 3 (three) times daily as needed., Disp: , Rfl:     isosorbide dinitrate (ISORDIL) 20 MG tablet, Take 20 mg by mouth once daily., Disp: , Rfl:     isosorbide mononitrate (ISMO,MONOKET) 20 MG Tab, Take 40 mg by mouth nightly., Disp: , Rfl:     linaGLIPtin (TRADJENTA) 5 mg Tab tablet, Take 5 mg by mouth., Disp: , Rfl:     lisinopriL (PRINIVIL,ZESTRIL) 40 MG tablet, 1 tablet, Disp: , Rfl:     LORazepam (ATIVAN) 1 MG tablet, Take 1 mg by mouth 2 (two) times daily., Disp: , Rfl:     melatonin (MELATIN) 3 mg tablet, TAKE TWO TABLETS BY MOUTH EVERY NIGHT AT BEDTIME AS NEEDED FOR INSOMNIA., Disp: , Rfl:     meloxicam (MOBIC) 15 MG tablet, Take 15 mg by mouth once daily., Disp: , Rfl:     methocarbamoL (ROBAXIN) 500 MG Tab, TAKE ONE TABLET BY MOUTH ONCE DAILY AND AND TAKE ONE TABLET BY MOUTH ONCE DAILY AT BEDTIME FOR MUSCLE SPASMS, Disp: , Rfl:     nitrofurantoin, macrocrystal-monohydrate, (MACROBID) 100 MG capsule, Take 100 mg by mouth., Disp: , Rfl:     oxybutynin (DITROPAN) 5 MG Tab, Take 5 mg by mouth 2 (two) times daily., Disp: , Rfl:     oxyCODONE-acetaminophen (PERCOCET)  mg per tablet, Take 1 tablet by mouth every 6 (six) hours as needed., Disp: , Rfl:     pantoprazole (PROTONIX) 40 MG tablet, 1 tablet, Disp: , Rfl:     temazepam (RESTORIL) 30 mg capsule, Take 30 mg by mouth nightly., Disp: , Rfl:     tiZANidine 4 mg Cap, Take 4 mg by mouth 2 (two) times daily as needed., Disp: , Rfl:     traZODone (DESYREL) 50 MG tablet, Take 50 mg by mouth nightly., Disp: , Rfl:     XARELTO 20 mg Tab, SMARTSI Tablet(s) By Mouth Every Evening, Disp: , Rfl:     predniSONE (DELTASONE) 10 MG tablet, 5 tabs  "9.13; 4 tabs 9.14; 3 tabs 9.15; 2 tabs 9.16; one tab 9.17.22 then off, Disp: 15 tablet, Rfl: 0     Objective:        Exam:   /82 (BP Location: Left arm, Patient Position: Sitting, BP Method: Medium (Manual))   Pulse 70   Ht 5' 10" (1.778 m)   Wt 86.2 kg (190 lb)   SpO2 99%   BMI 27.26 kg/m²     General Exam  _._unaccompanied  body habitus_ Body mass index is 27.26 kg/m².    mental status_alert and appropriate  oropharynx_Mallampati grade_  neck_  Heart__ unusual gallop; not afib   extremities_  skin_    Neurological:  cortical function__ normal   MMSE:   No flowsheet data found.    Speech __ normal   cranial nerves:  CN 2 VF_ok   fundi_ clear B   CN 3, 4, 6 EOMs_ok  CN 3, pupils_ok    CN 7_no lower face asymmetry  CN 8_hearing _ ok   CN 12 tongue_ok    Motor__ paraplegic   tone:   muscle stretch reflexes__  Vib sens_  Pin sens_  plantars__  tremor: _ none   coordination: _ F to N   gait_ w chair   Romberg:     Neuroimaging:  Images and imaging reports reviewed  My comments:   Recent CT head ok     Labs:      _._ new patient here and/or   ___ multiple issues/ diagnoses or problems [if not enumerated in note then discussed in encounter but chose to or failed to document]    complexity of data   _._high _mod   _._ images and reports reviewed:  __ hx obtained from family or accompaniment:   __other studies reviewed   _._studies ordered __   __studies considered or discussed but not ordered __  __DDx discussed __    risks  __high ._mod     __ (possible or definite) neurodegenerative condition expected to progress  __ (poss or def)             autoimmune condition with possibility of flares or unexpected attack  __ (poss or def)             seiz d.o. with possib of recurr seiz's   __ cerebrovasc ds with risk of recurrence of stroke  __ CNS meds (and/or) potentially high risk non CNS meds which may cause medical or behavioral side effects  __ fall risk  __ driving discussed   __ diagnosis unclear or DDx wide " making risk uncertain to high  __other: MUST stop stanback/aspirin  espec since on Xarelto       MDM/Medical Decision Making used for CPT choice based on above elements:  _._high _moderate         Assessment/Plan:       Problem List Items Addressed This Visit    None  Visit Diagnoses       Acute intractable headache, unspecified headache type    -  Primary              Aneurysm possible   Carotid dissection possible (recent general anesthesia for knee surgery then HA) but no Carmina's or cerebral isch symptoms or findings on recent CT head   Rebound HAs possible [but why recent onset]          Other comments/ follow up:          Orders Placed This Encounter   Procedures    CTA Neck    CTA Head    Sedimentation rate    CRP, High Sensitivity      Medications Ordered This Encounter   Medications    predniSONE (DELTASONE) 10 MG tablet     Si tabs 9.13; 4 tabs 9.14; 3 tabs 9.15; 2 tabs 9.16; one tab 9.17.22 then off     Dispense:  15 tablet     Refill:  0    MUST stop stanback/aspirin  especially since on Xarelto     A weaning dose of prednisone may help headache and may help wean off Stanback     Not good candidate for triptan given his HTN     Consider CGRP if not better in days ahead     Aim follow up 2w ___Dr. MIREYA Gilman MD [facility]

## 2022-09-21 ENCOUNTER — HISTORICAL (OUTPATIENT)
Dept: ADMINISTRATIVE | Facility: HOSPITAL | Age: 57
End: 2022-09-21
Payer: MEDICARE

## 2022-10-12 ENCOUNTER — HOSPITAL ENCOUNTER (OUTPATIENT)
Dept: RADIOLOGY | Facility: HOSPITAL | Age: 57
Discharge: HOME | End: 2022-10-12
Attending: SPECIALIST
Payer: MEDICARE

## 2022-10-12 DIAGNOSIS — R51.9 ACUTE INTRACTABLE HEADACHE, UNSPECIFIED HEADACHE TYPE: ICD-10-CM

## 2022-10-12 LAB
CREAT SERPL-MCNC: 0.9 MG/DL (ref 0.5–1.4)
SAMPLE: NORMAL

## 2022-10-12 PROCEDURE — 70496 CT ANGIOGRAPHY HEAD: CPT | Mod: TC

## 2022-10-12 PROCEDURE — 25500020 PHARM REV CODE 255: Performed by: SPECIALIST

## 2022-10-12 PROCEDURE — 70498 CT ANGIOGRAPHY NECK: CPT | Mod: TC

## 2022-10-12 RX ADMIN — IOPAMIDOL 100 ML: 755 INJECTION, SOLUTION INTRAVENOUS at 04:10

## 2022-11-04 ENCOUNTER — HOSPITAL ENCOUNTER (EMERGENCY)
Facility: HOSPITAL | Age: 57
Discharge: HOME | End: 2022-11-04
Payer: MEDICARE

## 2022-11-04 VITALS
SYSTOLIC BLOOD PRESSURE: 130 MMHG | DIASTOLIC BLOOD PRESSURE: 84 MMHG | TEMPERATURE: 98 F | RESPIRATION RATE: 18 BRPM | OXYGEN SATURATION: 97 % | HEART RATE: 71 BPM

## 2022-11-04 DIAGNOSIS — M25.571 RIGHT ANKLE PAIN: ICD-10-CM

## 2022-11-04 PROCEDURE — 99284 EMERGENCY DEPT VISIT MOD MDM: CPT | Mod: 25

## 2022-11-04 NOTE — FIRST PROVIDER EVALUATION
Medical screening examination initiated.  I have conducted a focused provider triage encounter, findings are as follows:  No chief complaint on file.      Brief history of present illness:  56 y/o male presents with right ankle pain, nontraumatic. Did have surgery over 2 months ago on this ankle with dr. Orellana.     There were no vitals filed for this visit.    Pertinent physical exam:  alert, nonlabored, in wheelchair with right ankle wrapped and in soft wrap boot.     Brief workup plan:  exam,  imaging    Preliminary workup initiated; this workup will be continued and followed by the physician or advanced practice provider that is assigned to the patient when roomed.

## 2023-03-01 ENCOUNTER — LAB REQUISITION (OUTPATIENT)
Dept: LAB | Facility: HOSPITAL | Age: 58
End: 2023-03-01
Payer: MEDICARE

## 2023-03-01 DIAGNOSIS — L89.623 PRESSURE ULCER OF LEFT HEEL, STAGE 3: ICD-10-CM

## 2023-03-01 DIAGNOSIS — I12.9 HYPERTENSIVE CHRONIC KIDNEY DISEASE WITH STAGE 1 THROUGH STAGE 4 CHRONIC KIDNEY DISEASE, OR UNSPECIFIED CHRONIC KIDNEY DISEASE: ICD-10-CM

## 2023-03-01 DIAGNOSIS — E11.9 TYPE 2 DIABETES MELLITUS WITHOUT COMPLICATIONS: ICD-10-CM

## 2023-03-01 LAB
ALBUMIN SERPL-MCNC: 3.4 G/DL (ref 3.5–5)
ALBUMIN/GLOB SERPL: 0.8 RATIO (ref 1.1–2)
ALP SERPL-CCNC: 121 UNIT/L (ref 40–150)
ALT SERPL-CCNC: 21 UNIT/L (ref 0–55)
AST SERPL-CCNC: 22 UNIT/L (ref 5–34)
BASOPHILS # BLD AUTO: 0.04 X10(3)/MCL (ref 0–0.2)
BASOPHILS NFR BLD AUTO: 0.7 %
BILIRUBIN DIRECT+TOT PNL SERPL-MCNC: 0.2 MG/DL
BUN SERPL-MCNC: 13.3 MG/DL (ref 8.4–25.7)
CALCIUM SERPL-MCNC: 8.9 MG/DL (ref 8.4–10.2)
CHLORIDE SERPL-SCNC: 103 MMOL/L (ref 98–107)
CO2 SERPL-SCNC: 28 MMOL/L (ref 22–29)
CREAT SERPL-MCNC: 0.93 MG/DL (ref 0.73–1.18)
CRP SERPL-MCNC: 5.4 MG/L
EOSINOPHIL # BLD AUTO: 0.06 X10(3)/MCL (ref 0–0.9)
EOSINOPHIL NFR BLD AUTO: 1.1 %
ERYTHROCYTE [DISTWIDTH] IN BLOOD BY AUTOMATED COUNT: 17.8 % (ref 11.5–17)
ERYTHROCYTE [SEDIMENTATION RATE] IN BLOOD: 58 MM/HR (ref 0–15)
GFR SERPLBLD CREATININE-BSD FMLA CKD-EPI: >60 MLS/MIN/1.73/M2
GLOBULIN SER-MCNC: 4.1 GM/DL (ref 2.4–3.5)
GLUCOSE SERPL-MCNC: 144 MG/DL (ref 74–100)
HCT VFR BLD AUTO: 39.4 % (ref 42–52)
HGB BLD-MCNC: 11.8 G/DL (ref 14–18)
IMM GRANULOCYTES # BLD AUTO: 0.02 X10(3)/MCL (ref 0–0.04)
IMM GRANULOCYTES NFR BLD AUTO: 0.4 %
LYMPHOCYTES # BLD AUTO: 2.61 X10(3)/MCL (ref 0.6–4.6)
LYMPHOCYTES NFR BLD AUTO: 46.8 %
MCH RBC QN AUTO: 25.6 PG
MCHC RBC AUTO-ENTMCNC: 29.9 G/DL (ref 33–36)
MCV RBC AUTO: 85.5 FL (ref 80–94)
MONOCYTES # BLD AUTO: 0.46 X10(3)/MCL (ref 0.1–1.3)
MONOCYTES NFR BLD AUTO: 8.2 %
NEUTROPHILS # BLD AUTO: 2.39 X10(3)/MCL (ref 2.1–9.2)
NEUTROPHILS NFR BLD AUTO: 42.8 %
NRBC BLD AUTO-RTO: 0 %
PLATELET # BLD AUTO: 434 X10(3)/MCL (ref 130–400)
PMV BLD AUTO: 9.9 FL (ref 7.4–10.4)
POTASSIUM SERPL-SCNC: 4.9 MMOL/L (ref 3.5–5.1)
PROT SERPL-MCNC: 7.5 GM/DL (ref 6.4–8.3)
RBC # BLD AUTO: 4.61 X10(6)/MCL (ref 4.7–6.1)
SODIUM SERPL-SCNC: 141 MMOL/L (ref 136–145)
WBC # SPEC AUTO: 5.6 X10(3)/MCL (ref 4.5–11.5)

## 2023-03-01 PROCEDURE — 86140 C-REACTIVE PROTEIN: CPT | Performed by: INTERNAL MEDICINE

## 2023-03-01 PROCEDURE — 80053 COMPREHEN METABOLIC PANEL: CPT | Performed by: INTERNAL MEDICINE

## 2023-03-01 PROCEDURE — 85651 RBC SED RATE NONAUTOMATED: CPT | Performed by: INTERNAL MEDICINE

## 2023-03-01 PROCEDURE — 85025 COMPLETE CBC W/AUTO DIFF WBC: CPT | Performed by: INTERNAL MEDICINE

## 2023-05-03 ENCOUNTER — LAB REQUISITION (OUTPATIENT)
Dept: LAB | Facility: HOSPITAL | Age: 58
End: 2023-05-03
Payer: MEDICARE

## 2023-05-03 DIAGNOSIS — M86.69 OTHER CHRONIC OSTEOMYELITIS, MULTIPLE SITES: ICD-10-CM

## 2023-05-03 DIAGNOSIS — E11.69 TYPE 2 DIABETES MELLITUS WITH OTHER SPECIFIED COMPLICATION: ICD-10-CM

## 2023-05-03 LAB
ALBUMIN SERPL-MCNC: 3.2 G/DL (ref 3.5–5)
ALBUMIN/GLOB SERPL: 0.8 RATIO (ref 1.1–2)
ALP SERPL-CCNC: 145 UNIT/L (ref 40–150)
ALT SERPL-CCNC: 19 UNIT/L (ref 0–55)
AST SERPL-CCNC: 20 UNIT/L (ref 5–34)
BASOPHILS # BLD AUTO: 0.03 X10(3)/MCL
BASOPHILS NFR BLD AUTO: 0.4 %
BILIRUBIN DIRECT+TOT PNL SERPL-MCNC: 0.3 MG/DL
BUN SERPL-MCNC: 10.3 MG/DL (ref 8.4–25.7)
CALCIUM SERPL-MCNC: 9 MG/DL (ref 8.4–10.2)
CHLORIDE SERPL-SCNC: 99 MMOL/L (ref 98–107)
CO2 SERPL-SCNC: 23 MMOL/L (ref 22–29)
CREAT SERPL-MCNC: 0.86 MG/DL (ref 0.73–1.18)
CRP SERPL-MCNC: 25.2 MG/L
EOSINOPHIL # BLD AUTO: 0.13 X10(3)/MCL (ref 0–0.9)
EOSINOPHIL NFR BLD AUTO: 1.7 %
ERYTHROCYTE [DISTWIDTH] IN BLOOD BY AUTOMATED COUNT: 17.1 % (ref 11.5–17)
ERYTHROCYTE [SEDIMENTATION RATE] IN BLOOD: 88 MM/HR (ref 0–15)
GFR SERPLBLD CREATININE-BSD FMLA CKD-EPI: >60 MLS/MIN/1.73/M2
GLOBULIN SER-MCNC: 4.1 GM/DL (ref 2.4–3.5)
GLUCOSE SERPL-MCNC: 241 MG/DL (ref 74–100)
HCT VFR BLD AUTO: 37.4 % (ref 42–52)
HGB BLD-MCNC: 11.9 G/DL (ref 14–18)
IMM GRANULOCYTES # BLD AUTO: 0.04 X10(3)/MCL (ref 0–0.04)
IMM GRANULOCYTES NFR BLD AUTO: 0.5 %
LYMPHOCYTES # BLD AUTO: 2.94 X10(3)/MCL (ref 0.6–4.6)
LYMPHOCYTES NFR BLD AUTO: 38.7 %
MCH RBC QN AUTO: 25.8 PG (ref 27–31)
MCHC RBC AUTO-ENTMCNC: 31.8 G/DL (ref 33–36)
MCV RBC AUTO: 81.1 FL (ref 80–94)
MONOCYTES # BLD AUTO: 0.53 X10(3)/MCL (ref 0.1–1.3)
MONOCYTES NFR BLD AUTO: 7 %
NEUTROPHILS # BLD AUTO: 3.93 X10(3)/MCL (ref 2.1–9.2)
NEUTROPHILS NFR BLD AUTO: 51.7 %
NRBC BLD AUTO-RTO: 0 %
PLATELET # BLD AUTO: 346 X10(3)/MCL (ref 130–400)
PMV BLD AUTO: 9.6 FL (ref 7.4–10.4)
POTASSIUM SERPL-SCNC: 4.2 MMOL/L (ref 3.5–5.1)
PROT SERPL-MCNC: 7.3 GM/DL (ref 6.4–8.3)
RBC # BLD AUTO: 4.61 X10(6)/MCL (ref 4.7–6.1)
SODIUM SERPL-SCNC: 135 MMOL/L (ref 136–145)
WBC # SPEC AUTO: 7.6 X10(3)/MCL (ref 4.5–11.5)

## 2023-05-03 PROCEDURE — 85025 COMPLETE CBC W/AUTO DIFF WBC: CPT | Performed by: INTERNAL MEDICINE

## 2023-05-03 PROCEDURE — 85651 RBC SED RATE NONAUTOMATED: CPT | Performed by: INTERNAL MEDICINE

## 2023-05-03 PROCEDURE — 86140 C-REACTIVE PROTEIN: CPT | Performed by: INTERNAL MEDICINE

## 2023-05-03 PROCEDURE — 80053 COMPREHEN METABOLIC PANEL: CPT | Performed by: INTERNAL MEDICINE

## 2023-06-28 ENCOUNTER — HOSPITAL ENCOUNTER (INPATIENT)
Facility: HOSPITAL | Age: 58
LOS: 42 days | Discharge: NURSING, REHAB OR LONG-TERM CARE | DRG: 004 | End: 2023-08-09
Attending: STUDENT IN AN ORGANIZED HEALTH CARE EDUCATION/TRAINING PROGRAM | Admitting: HOSPITALIST
Payer: MEDICARE

## 2023-06-28 DIAGNOSIS — I26.99 PE (PULMONARY THROMBOEMBOLISM): ICD-10-CM

## 2023-06-28 DIAGNOSIS — L08.9 FOREIGN BODY OF KNEE WITH INFECTION, RIGHT, INITIAL ENCOUNTER: Primary | ICD-10-CM

## 2023-06-28 DIAGNOSIS — M25.461 SWELLING OF RIGHT KNEE JOINT: ICD-10-CM

## 2023-06-28 DIAGNOSIS — N18.31 ANEMIA DUE TO STAGE 3A CHRONIC KIDNEY DISEASE: Chronic | ICD-10-CM

## 2023-06-28 DIAGNOSIS — I49.9 ARRHYTHMIA: ICD-10-CM

## 2023-06-28 DIAGNOSIS — D63.1 ANEMIA DUE TO STAGE 3A CHRONIC KIDNEY DISEASE: Chronic | ICD-10-CM

## 2023-06-28 DIAGNOSIS — S80.251A FOREIGN BODY OF KNEE WITH INFECTION, RIGHT, INITIAL ENCOUNTER: Primary | ICD-10-CM

## 2023-06-28 DIAGNOSIS — R06.02 SOB (SHORTNESS OF BREATH): ICD-10-CM

## 2023-06-28 DIAGNOSIS — I47.20 V-TACH: ICD-10-CM

## 2023-06-28 DIAGNOSIS — J96.00 ACUTE RESPIRATORY FAILURE: ICD-10-CM

## 2023-06-28 DIAGNOSIS — M71.061 ABSCESS OF BURSA OF RIGHT KNEE: ICD-10-CM

## 2023-06-28 LAB
ALBUMIN SERPL-MCNC: 2.5 G/DL (ref 3.5–5)
ALBUMIN/GLOB SERPL: 0.5 RATIO (ref 1.1–2)
ALP SERPL-CCNC: 90 UNIT/L (ref 40–150)
ALT SERPL-CCNC: 17 UNIT/L (ref 0–55)
AST SERPL-CCNC: 26 UNIT/L (ref 5–34)
BASOPHILS # BLD AUTO: 0.02 X10(3)/MCL
BASOPHILS NFR BLD AUTO: 0.2 %
BILIRUBIN DIRECT+TOT PNL SERPL-MCNC: 0.5 MG/DL
BUN SERPL-MCNC: 14.4 MG/DL (ref 8.4–25.7)
CALCIUM SERPL-MCNC: 9 MG/DL (ref 8.4–10.2)
CHLORIDE SERPL-SCNC: 96 MMOL/L (ref 98–107)
CO2 SERPL-SCNC: 27 MMOL/L (ref 22–29)
CREAT SERPL-MCNC: 0.9 MG/DL (ref 0.73–1.18)
CRP SERPL-MCNC: 218.2 MG/L
EOSINOPHIL # BLD AUTO: 0.03 X10(3)/MCL (ref 0–0.9)
EOSINOPHIL NFR BLD AUTO: 0.3 %
ERYTHROCYTE [DISTWIDTH] IN BLOOD BY AUTOMATED COUNT: 17.3 % (ref 11.5–17)
ERYTHROCYTE [SEDIMENTATION RATE] IN BLOOD: >130 MM/HR (ref 0–15)
GFR SERPLBLD CREATININE-BSD FMLA CKD-EPI: >60 MLS/MIN/1.73/M2
GLOBULIN SER-MCNC: 5.3 GM/DL (ref 2.4–3.5)
GLUCOSE SERPL-MCNC: 91 MG/DL (ref 74–100)
HCT VFR BLD AUTO: 27.6 % (ref 42–52)
HGB BLD-MCNC: 8.8 G/DL (ref 14–18)
IMM GRANULOCYTES # BLD AUTO: 0.1 X10(3)/MCL (ref 0–0.04)
IMM GRANULOCYTES NFR BLD AUTO: 1 %
LYMPHOCYTES # BLD AUTO: 1.97 X10(3)/MCL (ref 0.6–4.6)
LYMPHOCYTES NFR BLD AUTO: 19 %
MCH RBC QN AUTO: 24.5 PG (ref 27–31)
MCHC RBC AUTO-ENTMCNC: 31.9 G/DL (ref 33–36)
MCV RBC AUTO: 76.9 FL (ref 80–94)
MONOCYTES # BLD AUTO: 1.03 X10(3)/MCL (ref 0.1–1.3)
MONOCYTES NFR BLD AUTO: 10 %
NEUTROPHILS # BLD AUTO: 7.2 X10(3)/MCL (ref 2.1–9.2)
NEUTROPHILS NFR BLD AUTO: 69.5 %
NRBC BLD AUTO-RTO: 0.2 %
PLATELET # BLD AUTO: 488 X10(3)/MCL (ref 130–400)
PMV BLD AUTO: 9.7 FL (ref 7.4–10.4)
POTASSIUM SERPL-SCNC: 4.2 MMOL/L (ref 3.5–5.1)
PROT SERPL-MCNC: 7.8 GM/DL (ref 6.4–8.3)
RBC # BLD AUTO: 3.59 X10(6)/MCL (ref 4.7–6.1)
SODIUM SERPL-SCNC: 131 MMOL/L (ref 136–145)
WBC # SPEC AUTO: 10.35 X10(3)/MCL (ref 4.5–11.5)

## 2023-06-28 PROCEDURE — 86140 C-REACTIVE PROTEIN: CPT | Performed by: ORTHOPAEDIC SURGERY

## 2023-06-28 PROCEDURE — 87040 BLOOD CULTURE FOR BACTERIA: CPT | Performed by: NURSE PRACTITIONER

## 2023-06-28 PROCEDURE — 80053 COMPREHEN METABOLIC PANEL: CPT | Performed by: NURSE PRACTITIONER

## 2023-06-28 PROCEDURE — 20610 DRAIN/INJ JOINT/BURSA W/O US: CPT | Mod: RT

## 2023-06-28 PROCEDURE — 87070 CULTURE OTHR SPECIMN AEROBIC: CPT | Performed by: NURSE PRACTITIONER

## 2023-06-28 PROCEDURE — 85025 COMPLETE CBC W/AUTO DIFF WBC: CPT | Performed by: NURSE PRACTITIONER

## 2023-06-28 PROCEDURE — 25000003 PHARM REV CODE 250: Performed by: STUDENT IN AN ORGANIZED HEALTH CARE EDUCATION/TRAINING PROGRAM

## 2023-06-28 PROCEDURE — 25000003 PHARM REV CODE 250: Performed by: INTERNAL MEDICINE

## 2023-06-28 PROCEDURE — 63600175 PHARM REV CODE 636 W HCPCS: Performed by: INTERNAL MEDICINE

## 2023-06-28 PROCEDURE — 85652 RBC SED RATE AUTOMATED: CPT | Performed by: ORTHOPAEDIC SURGERY

## 2023-06-28 PROCEDURE — 25500020 PHARM REV CODE 255: Performed by: ORTHOPAEDIC SURGERY

## 2023-06-28 PROCEDURE — 99285 EMERGENCY DEPT VISIT HI MDM: CPT | Mod: 25

## 2023-06-28 PROCEDURE — 11000001 HC ACUTE MED/SURG PRIVATE ROOM

## 2023-06-28 PROCEDURE — 63600175 PHARM REV CODE 636 W HCPCS: Performed by: STUDENT IN AN ORGANIZED HEALTH CARE EDUCATION/TRAINING PROGRAM

## 2023-06-28 RX ORDER — AMITRIPTYLINE HYDROCHLORIDE 50 MG/1
50 TABLET, FILM COATED ORAL NIGHTLY
Status: ON HOLD | COMMUNITY
Start: 2023-06-15 | End: 2023-08-09 | Stop reason: HOSPADM

## 2023-06-28 RX ORDER — FLUOXETINE HYDROCHLORIDE 20 MG/1
20 CAPSULE ORAL
Status: ON HOLD | COMMUNITY
Start: 2023-06-15 | End: 2023-08-09 | Stop reason: HOSPADM

## 2023-06-28 RX ORDER — ACETAMINOPHEN 325 MG/1
650 TABLET ORAL EVERY 8 HOURS PRN
Status: DISCONTINUED | OUTPATIENT
Start: 2023-06-28 | End: 2023-07-04

## 2023-06-28 RX ORDER — SODIUM CHLORIDE 0.9 % (FLUSH) 0.9 %
10 SYRINGE (ML) INJECTION
Status: DISCONTINUED | OUTPATIENT
Start: 2023-06-28 | End: 2023-08-09 | Stop reason: HOSPADM

## 2023-06-28 RX ORDER — OXYCODONE HYDROCHLORIDE 5 MG/1
5 TABLET ORAL EVERY 4 HOURS PRN
Status: DISCONTINUED | OUTPATIENT
Start: 2023-06-28 | End: 2023-06-28

## 2023-06-28 RX ORDER — FENOFIBRATE 160 MG/1
160 TABLET ORAL
Status: ON HOLD | COMMUNITY
Start: 2023-06-15 | End: 2023-08-09 | Stop reason: HOSPADM

## 2023-06-28 RX ORDER — TALC
6 POWDER (GRAM) TOPICAL NIGHTLY PRN
Status: DISCONTINUED | OUTPATIENT
Start: 2023-06-28 | End: 2023-07-04

## 2023-06-28 RX ORDER — OXYCODONE HYDROCHLORIDE 5 MG/1
10 TABLET ORAL EVERY 4 HOURS PRN
Status: DISCONTINUED | OUTPATIENT
Start: 2023-06-28 | End: 2023-06-29

## 2023-06-28 RX ORDER — LISINOPRIL 10 MG/1
10 TABLET ORAL DAILY
Status: ON HOLD | COMMUNITY
Start: 2023-06-15 | End: 2023-08-09 | Stop reason: HOSPADM

## 2023-06-28 RX ORDER — CYCLOBENZAPRINE HCL 10 MG
10 TABLET ORAL 2 TIMES DAILY PRN
Status: ON HOLD | COMMUNITY
Start: 2023-06-15 | End: 2023-08-09 | Stop reason: HOSPADM

## 2023-06-28 RX ORDER — METFORMIN HYDROCHLORIDE 500 MG/1
TABLET ORAL
Status: ON HOLD | COMMUNITY
Start: 2023-06-15 | End: 2023-08-09 | Stop reason: HOSPADM

## 2023-06-28 RX ORDER — ENOXAPARIN SODIUM 100 MG/ML
40 INJECTION SUBCUTANEOUS EVERY 24 HOURS
Status: DISCONTINUED | OUTPATIENT
Start: 2023-06-28 | End: 2023-06-29

## 2023-06-28 RX ADMIN — OXYCODONE HYDROCHLORIDE 5 MG: 5 TABLET ORAL at 09:06

## 2023-06-28 RX ADMIN — VANCOMYCIN HYDROCHLORIDE 2000 MG: 10 INJECTION, POWDER, LYOPHILIZED, FOR SOLUTION INTRAVENOUS at 07:06

## 2023-06-28 RX ADMIN — PIPERACILLIN AND TAZOBACTAM 4.5 G: 4; .5 INJECTION, POWDER, LYOPHILIZED, FOR SOLUTION INTRAVENOUS; PARENTERAL at 07:06

## 2023-06-28 RX ADMIN — IOPAMIDOL 100 ML: 755 INJECTION, SOLUTION INTRAVENOUS at 06:06

## 2023-06-28 RX ADMIN — ENOXAPARIN SODIUM 40 MG: 40 INJECTION SUBCUTANEOUS at 09:06

## 2023-06-28 NOTE — ED PROVIDER NOTES
Encounter Date: 6/28/2023       History     Chief Complaint   Patient presents with    Knee Injury     PT reports hitting R knee this weekend and reports swelling and pain to knee. Has hardware in knee from previous surgery. Swelling noted to R knee      See MDM    The history is provided by the patient. No  was used.     Review of patient's allergies indicates:   Allergen Reactions    Baclofen Itching and Anxiety     Past Medical History:   Diagnosis Date    Arthritis     Chronic ulcer of ankle 05/26/2022    Frequent UTI 07/02/2019    Generalized anxiety disorder 05/26/2022    Neurogenic bladder 05/26/2022    Osteomyelitis 05/26/2022    Presence of suprapubic catheter 05/26/2022    Pure hypercholesterolemia 05/26/2022    Retention of urine, unspecified 08/09/2019    Spinal cord injury at T1-T6 level 04/20/2018     Past Surgical History:   Procedure Laterality Date    INSERTION OF INTRAMEDULLARY MARISA Right 8/10/2022    Procedure: INSERTION, INTRAMEDULLARY MARISA RIGHT TIBIA;  Surgeon: Jorge Orellana MD;  Location: [facility];  Service: Orthopedics;  Laterality: Right;     Family History   Problem Relation Age of Onset    No Known Problems Mother     No Known Problems Father      Social History     Tobacco Use    Smoking status: Every Day     Types: Cigars, Cigarettes    Smokeless tobacco: Never   Substance Use Topics    Alcohol use: Yes     Alcohol/week: 2.0 standard drinks     Types: 2 Cans of beer per week    Drug use: Not Currently     Review of Systems   Constitutional:  Negative for fever.   Respiratory:  Negative for cough and shortness of breath.    Cardiovascular:  Negative for chest pain.   Gastrointestinal:  Negative for abdominal pain.   Genitourinary:  Negative for difficulty urinating and dysuria.   Musculoskeletal:  Negative for gait problem.   Skin:  Negative for color change.   Neurological:  Negative for dizziness, speech difficulty and headaches.   Psychiatric/Behavioral:  Negative  for hallucinations and suicidal ideas.    All other systems reviewed and are negative.      Physical Exam     Initial Vitals [23 1346]   BP Pulse Resp Temp SpO2   119/77 95 18 99.2 °F (37.3 °C) 99 %      MAP       --         Physical Exam    Nursing note and vitals reviewed.  Constitutional: He appears well-developed and well-nourished.   HENT:   Head: Normocephalic.   Eyes: EOM are normal.   Neck: Neck supple.   Normal range of motion.  Cardiovascular:  Normal rate, regular rhythm, normal heart sounds and intact distal pulses.           Pulmonary/Chest: Breath sounds normal.   Abdominal: Abdomen is soft. Bowel sounds are normal.   Musculoskeletal:         General: Normal range of motion.      Cervical back: Normal range of motion and neck supple.      Comments: Swelling right knee     Neurological: He is alert and oriented to person, place, and time. He has normal strength.   Skin: Skin is warm and dry. Capillary refill takes less than 2 seconds.   Psychiatric: He has a normal mood and affect. His behavior is normal. Judgment and thought content normal.         ED Course   Abscess Aspiration    Date/Time: 2023 5:55 PM    Performed by: Ra Chong FNP  Authorized by: Luis Mcclure MD    Consent Done?:  Yes  Universal Protocol:     Verbal consent obtained?: Yes      Risks and benefits: Risks, benefits and alternatives were discussed      Consent given by:  Patient    Patient states understanding of procedure being performed: Yes      Patient's understanding of procedure matches consent: Yes      Test results available and properly labeled: Yes      Site marked: Yes      Imaging studies available: Yes      Patient identity confirmed:  Name, , MRN, provided demographic data and verbally with patient    Time out: Immediately prior to the procedure a time out was called    A time out verifies correct patient, procedure, equipment, support staff and site/side marked as required:   Procedure  Details:     Site prepped with:  Alcohol    Location of Abscess #1:  Right knee    Size of needle #1:  18    Aspirated amount #1 (mL):  8  Material send for:  Aerobic Culture  Post-procedure:      Needle aspiration of soft tissue lateral right knee. Did not enter joint capsule.     Labs Reviewed   COMPREHENSIVE METABOLIC PANEL - Abnormal; Notable for the following components:       Result Value    Sodium Level 131 (*)     Chloride 96 (*)     Albumin Level 2.5 (*)     Globulin 5.3 (*)     Albumin/Globulin Ratio 0.5 (*)     All other components within normal limits   CBC WITH DIFFERENTIAL - Abnormal; Notable for the following components:    RBC 3.59 (*)     Hgb 8.8 (*)     Hct 27.6 (*)     MCV 76.9 (*)     MCH 24.5 (*)     MCHC 31.9 (*)     RDW 17.3 (*)     Platelet 488 (*)     IG# 0.10 (*)     All other components within normal limits   C-REACTIVE PROTEIN - Abnormal; Notable for the following components:    C-Reactive Protein 218.20 (*)     All other components within normal limits   SEDIMENTATION RATE - Abnormal; Notable for the following components:    Sed Rate >130 (*)     All other components within normal limits   WOUND CULTURE ([facility])   BLOOD CULTURE [facility]   BLOOD CULTURE [facility]   CBC W/ AUTO DIFFERENTIAL    Narrative:     The following orders were created for panel order CBC auto differential.  Procedure                               Abnormality         Status                     ---------                               -----------         ------                     CBC with Differential[[id number]]        Abnormal            Final result                 Please view results for these tests on the individual orders.   OCCULT BLOOD,STOOL,DIAGNOSTIC (1-3)    Narrative:     The following orders were created for panel order Occult Blood, Stool, Diagnostic (1-3).  Procedure                               Abnormality         Status                     ---------                               -----------         ------                      Occult blood x 3, stool[[id number]]                                                       Please view results for these tests on the individual orders.   OCCULT BLOOD X 3, STOOL          Imaging Results              CT Knee W W/O Contrast Right (Final result)  Result time 06/28/23 18:37:42      Final result by Gustavo Cassidy MD (06/28/23 18:37:42)                   Impression:      Mildly limited assessment.  5 cm area of subcutaneous fluid in the prepatellar region may have thin peripheral enhancement.  Fluid collection is possible.    Subcutaneous edema in the calf.      Electronically signed by: Gustavo Cassidy  Date:    06/28/2023  Time:    18:37               Narrative:    EXAMINATION:  CT KNEE W W/O CONTRAST RIGHT    CLINICAL HISTORY:  possible infection;    TECHNIQUE:  CT imaging of the right knee before and after IV contrast.  Axial, coronal and sagittal reformatted images reviewed.   Dose length product is 585 mGycm. Automatic exposure control, adjustment of mA/kV or iterative reconstruction technique used to limit radiation dose.    COMPARISON:  Radiograph 06/28/2023    FINDINGS:  Assessment mildly limited by motion.  Joint alignments are maintained with degenerative changes at the knee.  Fixation hardware in the lower femur and tibia with remote proximal fibular fracture.  Areas of heterotopic ossification along the lower portion of the femur.  Small knee effusion.  Areas of subcutaneous edema anteriorly below the knee.  More focal area of fluid in the subcutaneous tissues of the prepatellar region measures up to 5 cm in transverse diameter and 5 cm in length.  There is image noise from the hardware, but this fluid may have thin areas of peripheral enhancement.  No tracking subcutaneous gas.                                       X-Ray Tibia Fibula 2 View Right (Final result)  Result time 06/28/23 18:14:06      Final result by Tracy Zamudio MD (06/28/23 18:14:06)                    Impression:      Posttraumatic and postsurgical changes at the femur and lower leg.  There is chronic deformity at the distal femur with heterogeneous sclerosis and lucency superimposed on background bony demineralization.  No old imaging of this region available for comparison.  This makes it difficult to evaluate for acute superimposed lytic change.    Postsurgical and posttraumatic change at the tibia and fibula with bony demineralization.  No appreciable acute osseous abnormality.      Electronically signed by: Tracy Zamudio  Date:    06/28/2023  Time:    18:14               Narrative:    EXAMINATION:  XR FEMUR 2 VIEW RIGHT; XR TIBIA FIBULA 2 VIEW RIGHT    CLINICAL HISTORY:  possible infection;; infection;    TECHNIQUE:  AP and lateral views of the right femur were performed.    AP and lateral views of the right tibia and fibula were obtained.    COMPARISON:  Knee radiographs 06/28/2023, tibia and fibular radiographs 08/10/2022    FINDINGS:  There are postsurgical changes of ORIF at the distal femur with lateral plate and screws.  There are postsurgical changes of ORIF at the tibia with antegrade intramedullary raven and proximal and distal interlocking screws.  Hardware appears intact.  No acute fracture seen.  There are old, healed fracture deformities.    There is chronic remodeling at the distal femur with heterogeneous appearance of the marrow and cortex distally.  There are areas of lucency as well as sclerosis.  There is no imaging through the distal femur available for comparison to evaluate for acute lytic changes superimposed on chronic background posttraumatic and postsurgical change.  Older prior radiographs of the tibia and fibula do not adequately imaged the area.  The bones are demineralized.    There is soft tissue swelling at the knee.  There is soft tissue swelling at the ankle.                                       X-Ray Femur 2 View Right (Final result)  Result time 06/28/23 18:14:06       Final result by Tracy Zamudio MD (06/28/23 18:14:06)                   Impression:      Posttraumatic and postsurgical changes at the femur and lower leg.  There is chronic deformity at the distal femur with heterogeneous sclerosis and lucency superimposed on background bony demineralization.  No old imaging of this region available for comparison.  This makes it difficult to evaluate for acute superimposed lytic change.    Postsurgical and posttraumatic change at the tibia and fibula with bony demineralization.  No appreciable acute osseous abnormality.      Electronically signed by: Tracy Zamudio  Date:    06/28/2023  Time:    18:14               Narrative:    EXAMINATION:  XR FEMUR 2 VIEW RIGHT; XR TIBIA FIBULA 2 VIEW RIGHT    CLINICAL HISTORY:  possible infection;; infection;    TECHNIQUE:  AP and lateral views of the right femur were performed.    AP and lateral views of the right tibia and fibula were obtained.    COMPARISON:  Knee radiographs 06/28/2023, tibia and fibular radiographs 08/10/2022    FINDINGS:  There are postsurgical changes of ORIF at the distal femur with lateral plate and screws.  There are postsurgical changes of ORIF at the tibia with antegrade intramedullary raven and proximal and distal interlocking screws.  Hardware appears intact.  No acute fracture seen.  There are old, healed fracture deformities.    There is chronic remodeling at the distal femur with heterogeneous appearance of the marrow and cortex distally.  There are areas of lucency as well as sclerosis.  There is no imaging through the distal femur available for comparison to evaluate for acute lytic changes superimposed on chronic background posttraumatic and postsurgical change.  Older prior radiographs of the tibia and fibula do not adequately imaged the area.  The bones are demineralized.    There is soft tissue swelling at the knee.  There is soft tissue swelling at the ankle.                                        X-Ray Knee 3 View Right (Final result)  Result time 06/28/23 14:18:03      Final result by Lanette Waller MD (06/28/23 14:18:03)                   Impression:      1. No acute bony abnormality.  2. Soft tissue swelling around the knee.      Electronically signed by: Lanette Waller  Date:    06/28/2023  Time:    14:18               Narrative:    EXAMINATION:  XR KNEE 3 VIEW RIGHT    CLINICAL HISTORY:  Effusion, right knee    COMPARISON:  None.    FINDINGS:  There has been prior internal fixation of the femur and tibia.  There are chronic changes of the bones with heterotopic ossification around the knee.  There is no acute fracture identified.  There is soft tissue swelling around the knee.                                       Medications   vancomycin 1.25 g in dextrose 5% 250 mL IVPB (ready to mix) (has no administration in time range)   piperacillin-tazobactam (ZOSYN) 4.5 g in dextrose 5 % in water (D5W) 5 % 100 mL IVPB (MB+) (has no administration in time range)   piperacillin-tazobactam (ZOSYN) 4.5 g in dextrose 5 % in water (D5W) 5 % 100 mL IVPB (MB+) (has no administration in time range)   vancomycin - pharmacy to dose (has no administration in time range)   vancomycin 2 g in dextrose 5 % 500 mL IVPB (has no administration in time range)   iopamidoL (ISOVUE-370) injection 100 mL (100 mLs Intravenous Given 6/28/23 1819)     Medical Decision Making:   Initial Assessment:   Historian:  Patient.  Patient is a 58-year-old male  that presents with swelling to right knee that has been present few days. Associated symptoms nothing. Surrounding information is nothing. Exacerbated by none. Relieved by none. Patient treatment prior to arrival nothing. Risk factors include nothing. Other history pertaining to this complaint nothing.   Assessment:  See physical exam.    Differential Diagnosis:   Knee abscess, septic joint, septic bursitis   ED Management:  History was obtained.  Physical was performed.  I did review  previous operation note from 8/10/22.  I did aspirate superficially the swelling in the right knee.  Purulent purulent aspirate was noted.  I did speak to [facility]. [facility].  He recommends a CT scan of the knee.  Dr. [facility] recommends admission with antibiotics.  I did speak to [facility].  They are accepting the admission.  I spoke to the emergency room physician Dr. Pelaez and he agrees with admission.  Social determinants that affect healthcare is paraplegia.  But he does have access to adequate help at home.             ED Course as of 06/28/23 1856 Wed Jun 28, 2023   1820  paged [CL]      ED Course User Index  [CL] SWAPNIL Murray                 Clinical Impression:   Final diagnoses:  [M25.461] Swelling of right knee joint  [S80.251A, L08.9] Foreign body of knee with infection, right, initial encounter (Primary)        ED Disposition Condition    Admit Stable                SWAPNIL Murray  06/28/23 1856       Ra Chong FNP  09/26/23 [id number]

## 2023-06-28 NOTE — FIRST PROVIDER EVALUATION
Medical screening examination initiated.  I have conducted a focused provider triage encounter, findings are as follows:    Brief history of present illness:  59 yo male presents to ED for evaluation of right knee swelling after hitting knee. Denies any redness or erythema.     There were no vitals filed for this visit.    Pertinent physical exam:  Patient is awake and alert and oriented.  Sitting in wheelchair.    Brief workup plan:  XR right knee    Preliminary workup initiated; this workup will be continued and followed by the physician or advanced practice provider that is assigned to the patient when roomed.

## 2023-06-28 NOTE — PROGRESS NOTES
Pharmacokinetic Initial Assessment: IV Vancomycin    Assessment/Plan:    Initiate intravenous vancomycin with loading dose of 2000 mg once followed by a maintenance dose of vancomycin 1500mg IV every 12 hours  Desired empiric serum trough concentration is 15 to 20 mcg/mL  Draw vancomycin trough level 60 min prior to fourth dose on 06/30 at approximately 0600  Pharmacy will continue to follow and monitor vancomycin.      Please contact pharmacy at extension [phone] with any questions regarding this assessment.     Thank you for the consult,   Renetta Eugene       Patient brief summary:  Bianca Khan is a 58 y.o. male initiated on antimicrobial therapy with IV Vancomycin for treatment of suspected bone/joint infection    Drug Allergies:   Review of patient's allergies indicates:   Allergen Reactions    Baclofen Itching and Anxiety       Actual Body Weight:   86.2 kg    Renal Function:   Estimated Creatinine Clearance: 91.3 mL/min (based on SCr of 0.9 mg/dL).,     Dialysis Method (if applicable):  N/A    CBC (last 72 hours):  Recent Labs   Lab Result Units 06/28/23  1701   WBC x10(3)/mcL 10.35   Hgb g/dL 8.8*   Hct % 27.6*   Platelet x10(3)/mcL 488*   Mono % % 10.0   Eos % % 0.3   Basophil % % 0.2       Metabolic Panel (last 72 hours):  Recent Labs   Lab Result Units 06/28/23  1701   Sodium Level mmol/L 131*   Potassium Level mmol/L 4.2   Chloride mmol/L 96*   Carbon Dioxide mmol/L 27   Glucose Level mg/dL 91   Blood Urea Nitrogen mg/dL 14.4   Creatinine mg/dL 0.90   Albumin Level g/dL 2.5*   Bilirubin Total mg/dL 0.5   Alkaline Phosphatase unit/L 90   Aspartate Aminotransferase unit/L 26   Alanine Aminotransferase unit/L 17       Drug levels (last 3 results):  No results for input(s): VANCOMYCINRA, VANCORANDOM, VANCOMYCINPE, VANCOPEAK, VANCOMYCINTR, VANCOTROUGH in the last 72 hours.    Microbiologic Results:  Microbiology Results (last 7 days)       Procedure Component Value Units Date/Time    Blood culture #2 **CANNOT  BE ORDERED STAT** [[id number]] Collected: 06/28/23 1701    Order Status: Resulted Specimen: Blood Updated: 06/28/23 1733    Blood culture #1 **CANNOT BE ORDERED STAT** [[id number]] Collected: 06/28/23 1701    Order Status: Resulted Specimen: Blood Updated: 06/28/23 1733    Wound Culture [[id number]] Collected: 06/28/23 1643    Order Status: Sent Specimen: Abscess from Knee, Right Updated: 06/28/23 1640

## 2023-06-29 ENCOUNTER — ANESTHESIA EVENT (OUTPATIENT)
Dept: SURGERY | Facility: HOSPITAL | Age: 58
DRG: 004 | End: 2023-06-29
Payer: MEDICARE

## 2023-06-29 ENCOUNTER — ANESTHESIA (OUTPATIENT)
Dept: SURGERY | Facility: HOSPITAL | Age: 58
DRG: 004 | End: 2023-06-29
Payer: MEDICARE

## 2023-06-29 LAB
ALBUMIN SERPL-MCNC: 2.4 G/DL (ref 3.5–5)
ALBUMIN/GLOB SERPL: 0.5 RATIO (ref 1.1–2)
ALP SERPL-CCNC: 77 UNIT/L (ref 40–150)
ALT SERPL-CCNC: 16 UNIT/L (ref 0–55)
AST SERPL-CCNC: 18 UNIT/L (ref 5–34)
BASOPHILS # BLD AUTO: 0.03 X10(3)/MCL
BASOPHILS NFR BLD AUTO: 0.3 %
BILIRUBIN DIRECT+TOT PNL SERPL-MCNC: 0.5 MG/DL
BUN SERPL-MCNC: 11.4 MG/DL (ref 8.4–25.7)
CALCIUM SERPL-MCNC: 8.6 MG/DL (ref 8.4–10.2)
CHLORIDE SERPL-SCNC: 99 MMOL/L (ref 98–107)
CO2 SERPL-SCNC: 26 MMOL/L (ref 22–29)
CREAT SERPL-MCNC: 0.89 MG/DL (ref 0.73–1.18)
EOSINOPHIL # BLD AUTO: 0.07 X10(3)/MCL (ref 0–0.9)
EOSINOPHIL NFR BLD AUTO: 0.8 %
ERYTHROCYTE [DISTWIDTH] IN BLOOD BY AUTOMATED COUNT: 17.6 % (ref 11.5–17)
GFR SERPLBLD CREATININE-BSD FMLA CKD-EPI: >60 MLS/MIN/1.73/M2
GLOBULIN SER-MCNC: 4.5 GM/DL (ref 2.4–3.5)
GLUCOSE SERPL-MCNC: 110 MG/DL (ref 74–100)
HCT VFR BLD AUTO: 29.6 % (ref 42–52)
HGB BLD-MCNC: 9.1 G/DL (ref 14–18)
IMM GRANULOCYTES # BLD AUTO: 0.07 X10(3)/MCL (ref 0–0.04)
IMM GRANULOCYTES NFR BLD AUTO: 0.8 %
LYMPHOCYTES # BLD AUTO: 1.68 X10(3)/MCL (ref 0.6–4.6)
LYMPHOCYTES NFR BLD AUTO: 18.5 %
MCH RBC QN AUTO: 24.3 PG (ref 27–31)
MCHC RBC AUTO-ENTMCNC: 30.7 G/DL (ref 33–36)
MCV RBC AUTO: 79.1 FL (ref 80–94)
MONOCYTES # BLD AUTO: 1 X10(3)/MCL (ref 0.1–1.3)
MONOCYTES NFR BLD AUTO: 11 %
NEUTROPHILS # BLD AUTO: 6.22 X10(3)/MCL (ref 2.1–9.2)
NEUTROPHILS NFR BLD AUTO: 68.6 %
NRBC BLD AUTO-RTO: 0 %
PLATELET # BLD AUTO: 531 X10(3)/MCL (ref 130–400)
PMV BLD AUTO: 9.2 FL (ref 7.4–10.4)
POCT GLUCOSE: 127 MG/DL (ref 70–110)
POTASSIUM SERPL-SCNC: 4.5 MMOL/L (ref 3.5–5.1)
PROT SERPL-MCNC: 6.9 GM/DL (ref 6.4–8.3)
RBC # BLD AUTO: 3.74 X10(6)/MCL (ref 4.7–6.1)
SODIUM SERPL-SCNC: 135 MMOL/L (ref 136–145)
WBC # SPEC AUTO: 9.07 X10(3)/MCL (ref 4.5–11.5)

## 2023-06-29 PROCEDURE — 36000704 HC OR TIME LEV I 1ST 15 MIN: Performed by: ORTHOPAEDIC SURGERY

## 2023-06-29 PROCEDURE — 25000003 PHARM REV CODE 250: Performed by: INTERNAL MEDICINE

## 2023-06-29 PROCEDURE — 25000003 PHARM REV CODE 250: Performed by: NURSE PRACTITIONER

## 2023-06-29 PROCEDURE — D9220A PRA ANESTHESIA: ICD-10-PCS | Mod: ANES,,, | Performed by: ANESTHESIOLOGY

## 2023-06-29 PROCEDURE — 27310 PR EXPLOR/DRAIN KNEE,INFECTN: ICD-10-PCS | Mod: RT,,, | Performed by: ORTHOPAEDIC SURGERY

## 2023-06-29 PROCEDURE — 87075 CULTR BACTERIA EXCEPT BLOOD: CPT | Performed by: ORTHOPAEDIC SURGERY

## 2023-06-29 PROCEDURE — 87205 SMEAR GRAM STAIN: CPT | Performed by: ORTHOPAEDIC SURGERY

## 2023-06-29 PROCEDURE — 80053 COMPREHEN METABOLIC PANEL: CPT | Performed by: INTERNAL MEDICINE

## 2023-06-29 PROCEDURE — 71000033 HC RECOVERY, INTIAL HOUR: Performed by: ORTHOPAEDIC SURGERY

## 2023-06-29 PROCEDURE — 25000003 PHARM REV CODE 250: Performed by: PHYSICIAN ASSISTANT

## 2023-06-29 PROCEDURE — 25000003 PHARM REV CODE 250: Performed by: ANESTHESIOLOGY

## 2023-06-29 PROCEDURE — 27301 PR INCIS/DRAIN THIGH/KNEE ABSCESS,DEEP: ICD-10-PCS | Mod: 51,RT,, | Performed by: ORTHOPAEDIC SURGERY

## 2023-06-29 PROCEDURE — 27310 EXPLORATION OF KNEE JOINT: CPT | Mod: AS,RT,, | Performed by: PHYSICIAN ASSISTANT

## 2023-06-29 PROCEDURE — 36000705 HC OR TIME LEV I EA ADD 15 MIN: Performed by: ORTHOPAEDIC SURGERY

## 2023-06-29 PROCEDURE — D9220A PRA ANESTHESIA: Mod: CRNA,,, | Performed by: NURSE ANESTHETIST, CERTIFIED REGISTERED

## 2023-06-29 PROCEDURE — 87116 MYCOBACTERIA CULTURE: CPT | Performed by: ORTHOPAEDIC SURGERY

## 2023-06-29 PROCEDURE — 63600175 PHARM REV CODE 636 W HCPCS: Performed by: NURSE ANESTHETIST, CERTIFIED REGISTERED

## 2023-06-29 PROCEDURE — 63600175 PHARM REV CODE 636 W HCPCS: Performed by: INTERNAL MEDICINE

## 2023-06-29 PROCEDURE — 85025 COMPLETE CBC W/AUTO DIFF WBC: CPT | Performed by: INTERNAL MEDICINE

## 2023-06-29 PROCEDURE — 87206 SMEAR FLUORESCENT/ACID STAI: CPT | Performed by: ORTHOPAEDIC SURGERY

## 2023-06-29 PROCEDURE — 37000008 HC ANESTHESIA 1ST 15 MINUTES: Performed by: ORTHOPAEDIC SURGERY

## 2023-06-29 PROCEDURE — 27301 DRAIN THIGH/KNEE LESION: CPT | Mod: 51,RT,, | Performed by: ORTHOPAEDIC SURGERY

## 2023-06-29 PROCEDURE — C9113 INJ PANTOPRAZOLE SODIUM, VIA: HCPCS

## 2023-06-29 PROCEDURE — 27201423 OPTIME MED/SURG SUP & DEVICES STERILE SUPPLY: Performed by: ORTHOPAEDIC SURGERY

## 2023-06-29 PROCEDURE — 27310 PR EXPLOR/DRAIN KNEE,INFECTN: ICD-10-PCS | Mod: AS,RT,, | Performed by: PHYSICIAN ASSISTANT

## 2023-06-29 PROCEDURE — 63600175 PHARM REV CODE 636 W HCPCS: Performed by: HOSPITALIST

## 2023-06-29 PROCEDURE — 99223 PR INITIAL HOSPITAL CARE,LEVL III: ICD-10-PCS | Mod: 57,,, | Performed by: ORTHOPAEDIC SURGERY

## 2023-06-29 PROCEDURE — 99223 1ST HOSP IP/OBS HIGH 75: CPT | Mod: 57,,, | Performed by: ORTHOPAEDIC SURGERY

## 2023-06-29 PROCEDURE — 37000009 HC ANESTHESIA EA ADD 15 MINS: Performed by: ORTHOPAEDIC SURGERY

## 2023-06-29 PROCEDURE — 87102 FUNGUS ISOLATION CULTURE: CPT | Performed by: ORTHOPAEDIC SURGERY

## 2023-06-29 PROCEDURE — 82962 GLUCOSE BLOOD TEST: CPT | Performed by: ORTHOPAEDIC SURGERY

## 2023-06-29 PROCEDURE — 87070 CULTURE OTHR SPECIMN AEROBIC: CPT | Performed by: ORTHOPAEDIC SURGERY

## 2023-06-29 PROCEDURE — 63600175 PHARM REV CODE 636 W HCPCS: Performed by: PHYSICIAN ASSISTANT

## 2023-06-29 PROCEDURE — 63600175 PHARM REV CODE 636 W HCPCS: Performed by: NURSE PRACTITIONER

## 2023-06-29 PROCEDURE — 25000003 PHARM REV CODE 250: Performed by: HOSPITALIST

## 2023-06-29 PROCEDURE — 27310 EXPLORATION OF KNEE JOINT: CPT | Mod: RT,,, | Performed by: ORTHOPAEDIC SURGERY

## 2023-06-29 PROCEDURE — D9220A PRA ANESTHESIA: Mod: ANES,,, | Performed by: ANESTHESIOLOGY

## 2023-06-29 PROCEDURE — 11000001 HC ACUTE MED/SURG PRIVATE ROOM

## 2023-06-29 PROCEDURE — 25000003 PHARM REV CODE 250: Performed by: NURSE ANESTHETIST, CERTIFIED REGISTERED

## 2023-06-29 PROCEDURE — 63600175 PHARM REV CODE 636 W HCPCS

## 2023-06-29 PROCEDURE — D9220A PRA ANESTHESIA: ICD-10-PCS | Mod: CRNA,,, | Performed by: NURSE ANESTHETIST, CERTIFIED REGISTERED

## 2023-06-29 PROCEDURE — 63600175 PHARM REV CODE 636 W HCPCS: Performed by: ANESTHESIOLOGY

## 2023-06-29 PROCEDURE — C9113 INJ PANTOPRAZOLE SODIUM, VIA: HCPCS | Performed by: ANESTHESIOLOGY

## 2023-06-29 PROCEDURE — 21400001 HC TELEMETRY ROOM

## 2023-06-29 PROCEDURE — 63600175 PHARM REV CODE 636 W HCPCS: Performed by: ORTHOPAEDIC SURGERY

## 2023-06-29 RX ORDER — CARVEDILOL 12.5 MG/1
12.5 TABLET ORAL 2 TIMES DAILY
Status: DISCONTINUED | OUTPATIENT
Start: 2023-06-29 | End: 2023-07-04

## 2023-06-29 RX ORDER — PROPOFOL 10 MG/ML
INJECTION, EMULSION INTRAVENOUS
Status: DISCONTINUED | OUTPATIENT
Start: 2023-06-29 | End: 2023-06-29

## 2023-06-29 RX ORDER — FLUOXETINE HYDROCHLORIDE 20 MG/1
20 CAPSULE ORAL DAILY
Status: DISCONTINUED | OUTPATIENT
Start: 2023-06-29 | End: 2023-07-04

## 2023-06-29 RX ORDER — LIDOCAINE HYDROCHLORIDE 10 MG/ML
1 INJECTION, SOLUTION EPIDURAL; INFILTRATION; INTRACAUDAL; PERINEURAL ONCE
Status: DISCONTINUED | OUTPATIENT
Start: 2023-06-29 | End: 2023-06-29 | Stop reason: HOSPADM

## 2023-06-29 RX ORDER — ZOLPIDEM TARTRATE 5 MG/1
5 TABLET ORAL NIGHTLY PRN
Status: DISCONTINUED | OUTPATIENT
Start: 2023-06-29 | End: 2023-07-04

## 2023-06-29 RX ORDER — OXYCODONE HYDROCHLORIDE 5 MG/1
5 TABLET ORAL EVERY 6 HOURS PRN
Status: DISCONTINUED | OUTPATIENT
Start: 2023-06-29 | End: 2023-07-04

## 2023-06-29 RX ORDER — LORAZEPAM 1 MG/1
1 TABLET ORAL EVERY 12 HOURS PRN
Status: DISCONTINUED | OUTPATIENT
Start: 2023-06-29 | End: 2023-07-04

## 2023-06-29 RX ORDER — ONDANSETRON 2 MG/ML
INJECTION INTRAMUSCULAR; INTRAVENOUS
Status: DISCONTINUED | OUTPATIENT
Start: 2023-06-29 | End: 2023-06-29

## 2023-06-29 RX ORDER — PANTOPRAZOLE SODIUM 40 MG/10ML
INJECTION, POWDER, LYOPHILIZED, FOR SOLUTION INTRAVENOUS
Status: COMPLETED
Start: 2023-06-29 | End: 2023-06-29

## 2023-06-29 RX ORDER — DOXYCYCLINE HYCLATE 100 MG
100 TABLET ORAL EVERY 12 HOURS
Status: DISCONTINUED | OUTPATIENT
Start: 2023-06-29 | End: 2023-07-12

## 2023-06-29 RX ORDER — HYDROMORPHONE HYDROCHLORIDE 2 MG/ML
0.2 INJECTION, SOLUTION INTRAMUSCULAR; INTRAVENOUS; SUBCUTANEOUS EVERY 5 MIN PRN
Status: DISCONTINUED | OUTPATIENT
Start: 2023-06-29 | End: 2023-06-29 | Stop reason: HOSPADM

## 2023-06-29 RX ORDER — SODIUM CITRATE AND CITRIC ACID MONOHYDRATE 334; 500 MG/5ML; MG/5ML
SOLUTION ORAL
Status: DISPENSED
Start: 2023-06-29 | End: 2023-06-29

## 2023-06-29 RX ORDER — MEPERIDINE HYDROCHLORIDE 25 MG/ML
12.5 INJECTION INTRAMUSCULAR; INTRAVENOUS; SUBCUTANEOUS EVERY 10 MIN PRN
Status: DISCONTINUED | OUTPATIENT
Start: 2023-06-29 | End: 2023-06-29 | Stop reason: HOSPADM

## 2023-06-29 RX ORDER — OXYCODONE HYDROCHLORIDE 10 MG/1
10 TABLET ORAL EVERY 4 HOURS PRN
Status: DISCONTINUED | OUTPATIENT
Start: 2023-06-29 | End: 2023-07-04

## 2023-06-29 RX ORDER — MELOXICAM 7.5 MG/1
15 TABLET ORAL DAILY
Status: DISCONTINUED | OUTPATIENT
Start: 2023-06-29 | End: 2023-07-02

## 2023-06-29 RX ORDER — DIPHENHYDRAMINE HYDROCHLORIDE 50 MG/ML
25 INJECTION INTRAMUSCULAR; INTRAVENOUS EVERY 6 HOURS PRN
Status: DISCONTINUED | OUTPATIENT
Start: 2023-06-29 | End: 2023-06-29 | Stop reason: HOSPADM

## 2023-06-29 RX ORDER — GABAPENTIN 300 MG/1
300 CAPSULE ORAL 2 TIMES DAILY
Status: DISCONTINUED | OUTPATIENT
Start: 2023-06-29 | End: 2023-07-03

## 2023-06-29 RX ORDER — TALC
6 POWDER (GRAM) TOPICAL NIGHTLY PRN
Status: DISCONTINUED | OUTPATIENT
Start: 2023-06-29 | End: 2023-07-04

## 2023-06-29 RX ORDER — LIDOCAINE HYDROCHLORIDE 20 MG/ML
INJECTION, SOLUTION EPIDURAL; INFILTRATION; INTRACAUDAL; PERINEURAL
Status: DISCONTINUED | OUTPATIENT
Start: 2023-06-29 | End: 2023-06-29

## 2023-06-29 RX ORDER — LISINOPRIL 10 MG/1
10 TABLET ORAL DAILY
Status: DISCONTINUED | OUTPATIENT
Start: 2023-06-29 | End: 2023-06-30

## 2023-06-29 RX ORDER — METHOCARBAMOL 500 MG/1
500 TABLET, FILM COATED ORAL 3 TIMES DAILY
Status: DISCONTINUED | OUTPATIENT
Start: 2023-06-29 | End: 2023-07-04

## 2023-06-29 RX ORDER — METFORMIN HYDROCHLORIDE 500 MG/1
500 TABLET ORAL 2 TIMES DAILY WITH MEALS
Status: DISCONTINUED | OUTPATIENT
Start: 2023-06-29 | End: 2023-06-30

## 2023-06-29 RX ORDER — TRAZODONE HYDROCHLORIDE 50 MG/1
50 TABLET ORAL NIGHTLY
Status: DISCONTINUED | OUTPATIENT
Start: 2023-06-29 | End: 2023-07-04

## 2023-06-29 RX ORDER — PROCHLORPERAZINE EDISYLATE 5 MG/ML
5 INJECTION INTRAMUSCULAR; INTRAVENOUS EVERY 30 MIN PRN
Status: DISCONTINUED | OUTPATIENT
Start: 2023-06-29 | End: 2023-06-29 | Stop reason: HOSPADM

## 2023-06-29 RX ORDER — PANTOPRAZOLE SODIUM 40 MG/10ML
40 INJECTION, POWDER, LYOPHILIZED, FOR SOLUTION INTRAVENOUS DAILY
Status: DISCONTINUED | OUTPATIENT
Start: 2023-06-29 | End: 2023-07-04

## 2023-06-29 RX ORDER — MIDAZOLAM HYDROCHLORIDE 1 MG/ML
2 INJECTION INTRAMUSCULAR; INTRAVENOUS
Status: ACTIVE | OUTPATIENT
Start: 2023-06-29 | End: 2023-06-29

## 2023-06-29 RX ORDER — VANCOMYCIN HYDROCHLORIDE 1 G/20ML
INJECTION, POWDER, LYOPHILIZED, FOR SOLUTION INTRAVENOUS
Status: DISCONTINUED | OUTPATIENT
Start: 2023-06-29 | End: 2023-06-29 | Stop reason: HOSPADM

## 2023-06-29 RX ORDER — PANTOPRAZOLE SODIUM 40 MG/1
40 TABLET, DELAYED RELEASE ORAL DAILY
Status: DISCONTINUED | OUTPATIENT
Start: 2023-06-29 | End: 2023-06-29

## 2023-06-29 RX ORDER — ATORVASTATIN CALCIUM 10 MG/1
20 TABLET, FILM COATED ORAL NIGHTLY
Status: DISCONTINUED | OUTPATIENT
Start: 2023-06-29 | End: 2023-07-12

## 2023-06-29 RX ORDER — ONDANSETRON 2 MG/ML
4 INJECTION INTRAMUSCULAR; INTRAVENOUS DAILY PRN
Status: DISCONTINUED | OUTPATIENT
Start: 2023-06-29 | End: 2023-06-29

## 2023-06-29 RX ORDER — OXYBUTYNIN CHLORIDE 5 MG/1
5 TABLET ORAL 2 TIMES DAILY
Status: DISCONTINUED | OUTPATIENT
Start: 2023-06-29 | End: 2023-07-12

## 2023-06-29 RX ORDER — BUSPIRONE HYDROCHLORIDE 5 MG/1
5 TABLET ORAL 2 TIMES DAILY
Status: DISCONTINUED | OUTPATIENT
Start: 2023-06-29 | End: 2023-07-04

## 2023-06-29 RX ORDER — FENOFIBRATE 145 MG/1
145 TABLET, FILM COATED ORAL DAILY
Status: DISCONTINUED | OUTPATIENT
Start: 2023-06-29 | End: 2023-07-04

## 2023-06-29 RX ORDER — HYDROMORPHONE HYDROCHLORIDE 2 MG/ML
0.4 INJECTION, SOLUTION INTRAMUSCULAR; INTRAVENOUS; SUBCUTANEOUS EVERY 5 MIN PRN
Status: DISCONTINUED | OUTPATIENT
Start: 2023-06-29 | End: 2023-06-29 | Stop reason: HOSPADM

## 2023-06-29 RX ORDER — ONDANSETRON 2 MG/ML
4 INJECTION INTRAMUSCULAR; INTRAVENOUS EVERY 6 HOURS PRN
Status: DISCONTINUED | OUTPATIENT
Start: 2023-06-29 | End: 2023-07-04

## 2023-06-29 RX ORDER — SODIUM CITRATE AND CITRIC ACID MONOHYDRATE 334; 500 MG/5ML; MG/5ML
30 SOLUTION ORAL ONCE
Status: COMPLETED | OUTPATIENT
Start: 2023-06-29 | End: 2023-06-29

## 2023-06-29 RX ORDER — MIDAZOLAM HYDROCHLORIDE 1 MG/ML
INJECTION INTRAMUSCULAR; INTRAVENOUS
Status: DISCONTINUED | OUTPATIENT
Start: 2023-06-29 | End: 2023-06-29

## 2023-06-29 RX ORDER — CYCLOBENZAPRINE HCL 10 MG
10 TABLET ORAL 2 TIMES DAILY PRN
Status: DISCONTINUED | OUTPATIENT
Start: 2023-06-29 | End: 2023-07-04

## 2023-06-29 RX ORDER — AMLODIPINE BESYLATE 5 MG/1
10 TABLET ORAL DAILY
Status: DISCONTINUED | OUTPATIENT
Start: 2023-06-29 | End: 2023-07-04

## 2023-06-29 RX ORDER — ENOXAPARIN SODIUM 100 MG/ML
40 INJECTION SUBCUTANEOUS EVERY 24 HOURS
Status: DISCONTINUED | OUTPATIENT
Start: 2023-06-29 | End: 2023-07-03

## 2023-06-29 RX ORDER — MORPHINE SULFATE 10 MG/ML
4 INJECTION INTRAMUSCULAR; INTRAVENOUS; SUBCUTANEOUS EVERY 6 HOURS PRN
Status: DISCONTINUED | OUTPATIENT
Start: 2023-06-29 | End: 2023-07-04

## 2023-06-29 RX ORDER — SODIUM CITRATE AND CITRIC ACID MONOHYDRATE 334; 500 MG/5ML; MG/5ML
30 SOLUTION ORAL ONCE
Status: DISCONTINUED | OUTPATIENT
Start: 2023-06-29 | End: 2023-08-09 | Stop reason: HOSPADM

## 2023-06-29 RX ORDER — AMITRIPTYLINE HYDROCHLORIDE 50 MG/1
50 TABLET, FILM COATED ORAL NIGHTLY
Status: DISCONTINUED | OUTPATIENT
Start: 2023-06-29 | End: 2023-07-04

## 2023-06-29 RX ORDER — SODIUM CHLORIDE, SODIUM GLUCONATE, SODIUM ACETATE, POTASSIUM CHLORIDE AND MAGNESIUM CHLORIDE 30; 37; 368; 526; 502 MG/100ML; MG/100ML; MG/100ML; MG/100ML; MG/100ML
INJECTION, SOLUTION INTRAVENOUS CONTINUOUS
Status: DISCONTINUED | OUTPATIENT
Start: 2023-06-29 | End: 2023-07-02

## 2023-06-29 RX ORDER — GLYCOPYRROLATE 0.2 MG/ML
INJECTION INTRAMUSCULAR; INTRAVENOUS
Status: DISCONTINUED | OUTPATIENT
Start: 2023-06-29 | End: 2023-06-29

## 2023-06-29 RX ADMIN — DOXYCYCLINE HYCLATE 100 MG: 100 TABLET, COATED ORAL at 10:06

## 2023-06-29 RX ADMIN — VANCOMYCIN HYDROCHLORIDE 1500 MG: 1.5 INJECTION, POWDER, LYOPHILIZED, FOR SOLUTION INTRAVENOUS at 06:06

## 2023-06-29 RX ADMIN — ATORVASTATIN CALCIUM 20 MG: 10 TABLET, FILM COATED ORAL at 08:06

## 2023-06-29 RX ADMIN — BUSPIRONE HYDROCHLORIDE 5 MG: 5 TABLET ORAL at 08:06

## 2023-06-29 RX ADMIN — METFORMIN HYDROCHLORIDE 500 MG: 500 TABLET, FILM COATED ORAL at 06:06

## 2023-06-29 RX ADMIN — ONDANSETRON 4 MG: 2 INJECTION INTRAMUSCULAR; INTRAVENOUS at 10:06

## 2023-06-29 RX ADMIN — GLYCOPYRROLATE 0.1 MG: 0.2 INJECTION INTRAMUSCULAR; INTRAVENOUS at 10:06

## 2023-06-29 RX ADMIN — OXYCODONE HYDROCHLORIDE 10 MG: 10 TABLET ORAL at 01:06

## 2023-06-29 RX ADMIN — OXYCODONE HYDROCHLORIDE 10 MG: 10 TABLET ORAL at 08:06

## 2023-06-29 RX ADMIN — PIPERACILLIN AND TAZOBACTAM 4.5 G: 4; .5 INJECTION, POWDER, LYOPHILIZED, FOR SOLUTION INTRAVENOUS; PARENTERAL at 10:06

## 2023-06-29 RX ADMIN — CYCLOBENZAPRINE 10 MG: 10 TABLET, FILM COATED ORAL at 01:06

## 2023-06-29 RX ADMIN — MELOXICAM 15 MG: 7.5 TABLET ORAL at 01:06

## 2023-06-29 RX ADMIN — FENOFIBRATE 145 MG: 145 TABLET, FILM COATED ORAL at 01:06

## 2023-06-29 RX ADMIN — OXYBUTYNIN CHLORIDE 5 MG: 5 TABLET ORAL at 08:06

## 2023-06-29 RX ADMIN — CARVEDILOL 12.5 MG: 12.5 TABLET, FILM COATED ORAL at 01:06

## 2023-06-29 RX ADMIN — ACETAMINOPHEN 650 MG: 325 TABLET, FILM COATED ORAL at 08:06

## 2023-06-29 RX ADMIN — PROPOFOL 150 MG: 10 INJECTION, EMULSION INTRAVENOUS at 10:06

## 2023-06-29 RX ADMIN — LISINOPRIL 10 MG: 10 TABLET ORAL at 06:06

## 2023-06-29 RX ADMIN — ZOLPIDEM TARTRATE 5 MG: 5 TABLET ORAL at 10:06

## 2023-06-29 RX ADMIN — PIPERACILLIN AND TAZOBACTAM 4.5 G: 4; .5 INJECTION, POWDER, LYOPHILIZED, FOR SOLUTION INTRAVENOUS; PARENTERAL at 07:06

## 2023-06-29 RX ADMIN — MIDAZOLAM HYDROCHLORIDE 2 MG: 1 INJECTION, SOLUTION INTRAMUSCULAR; INTRAVENOUS at 10:06

## 2023-06-29 RX ADMIN — OXYBUTYNIN CHLORIDE 5 MG: 5 TABLET ORAL at 01:06

## 2023-06-29 RX ADMIN — ENOXAPARIN SODIUM 40 MG: 40 INJECTION SUBCUTANEOUS at 07:06

## 2023-06-29 RX ADMIN — METHOCARBAMOL 500 MG: 500 TABLET ORAL at 08:06

## 2023-06-29 RX ADMIN — LIDOCAINE HYDROCHLORIDE 50 MG: 20 INJECTION, SOLUTION EPIDURAL; INFILTRATION; INTRACAUDAL; PERINEURAL at 10:06

## 2023-06-29 RX ADMIN — CEFTRIAXONE SODIUM 2 G: 2 INJECTION, POWDER, FOR SOLUTION INTRAMUSCULAR; INTRAVENOUS at 10:06

## 2023-06-29 RX ADMIN — GABAPENTIN 300 MG: 300 CAPSULE ORAL at 01:06

## 2023-06-29 RX ADMIN — AMLODIPINE BESYLATE 10 MG: 5 TABLET ORAL at 01:06

## 2023-06-29 RX ADMIN — PIPERACILLIN AND TAZOBACTAM 4.5 G: 4; .5 INJECTION, POWDER, LYOPHILIZED, FOR SOLUTION INTRAVENOUS; PARENTERAL at 03:06

## 2023-06-29 RX ADMIN — TRAZODONE HYDROCHLORIDE 50 MG: 50 TABLET ORAL at 08:06

## 2023-06-29 RX ADMIN — METHOCARBAMOL 500 MG: 500 TABLET ORAL at 04:06

## 2023-06-29 RX ADMIN — SODIUM CITRATE AND CITRIC ACID MONOHYDRATE 30 ML: 500; 334 SOLUTION ORAL at 10:06

## 2023-06-29 RX ADMIN — PANTOPRAZOLE SODIUM 40 MG: 40 INJECTION, POWDER, LYOPHILIZED, FOR SOLUTION INTRAVENOUS at 01:06

## 2023-06-29 RX ADMIN — PANTOPRAZOLE SODIUM 40 MG: 40 INJECTION, POWDER, FOR SOLUTION INTRAVENOUS at 10:06

## 2023-06-29 RX ADMIN — FLUOXETINE 20 MG: 20 CAPSULE ORAL at 01:06

## 2023-06-29 RX ADMIN — SODIUM CHLORIDE, SODIUM GLUCONATE, SODIUM ACETATE, POTASSIUM CHLORIDE AND MAGNESIUM CHLORIDE: 526; 502; 368; 37; 30 INJECTION, SOLUTION INTRAVENOUS at 10:06

## 2023-06-29 RX ADMIN — GABAPENTIN 300 MG: 300 CAPSULE ORAL at 08:06

## 2023-06-29 NOTE — PROGRESS NOTES
[facility] Hospital Medicine Progress Note        Chief Complaint: Inpatient Follow-up for right knee septic arthritis    HPI:    58-year-old male with a history of 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, neurogenic bladder with suprapubic catheter, DM2, HTN, and additional past medical history as below who presented to the ER complaining that he struck his knee this weekend and has since had pain and swelling to this area.  He does report a prior knee surgery in the same right knee, remotely.     Patient was afebrile hemodynamically stable on arrival laboratory work showed anemia with microcytosis, and significantly elevated inflammatory markers.  CT of the right knee noted a 5 cm area subcutaneous fluid collection in the prepatellar region.  Purulent fluid was able to be aspirated in the ER per documentation.  Patient has been started on broad-spectrum antibiotics admitted to the hospitalist service with consultation to orthopedic surgery.  Interval Hx:   No acute events reported overnight seen and examined after surgery today, patient underwent right prepatellar bursa abscess incision and drainage, incision and drainage right knee septic arthritis.  Postoperative patient is alert do not offer any specific complaints, no family members at bedside     Case was discussed with patient's nurse and  on the floor.    Objective/physical exam:  General: In no acute distress, afebrile  Chest: Clear to auscultation bilaterally  Heart:  +S1, S2, no appreciable murmur  Abdomen: Soft, nontender, BS +  MSK:  Right knee dressing with a wound VAC noted  Neurologic: Alert and oriented  VITAL SIGNS: 24 HRS MIN & MAX LAST   Temp  Min: 97.7 °F (36.5 °C)  Max: 99.2 °F (37.3 °C) 98.5 °F (36.9 °C)   BP  Min: 116/68  Max: 146/77 (!) 144/73   Pulse  Min: 79  Max: 95  95   Resp  Min: 16  Max: 18 16   SpO2  Min: 91 %  Max: 99 % 97 %     I have reviewed the following  labs:    Recent Labs   Lab 06/28/23  1701 06/29/23  0400   WBC 10.35 9.07   RBC 3.59* 3.74*   HGB 8.8* 9.1*   HCT 27.6* 29.6*   MCV 76.9* 79.1*   MCH 24.5* 24.3*   MCHC 31.9* 30.7*   RDW 17.3* 17.6*   * 531*   MPV 9.7 9.2       Recent Labs   Lab 06/28/23  1701 06/29/23  0400   * 135*   K 4.2 4.5   CO2 27 26   BUN 14.4 11.4   CREATININE 0.90 0.89   CALCIUM 9.0 8.6   ALBUMIN 2.5* 2.4*   ALKPHOS 90 77   ALT 17 16   AST 26 18   BILITOT 0.5 0.5          Microbiology Results (last 7 days)       Procedure Component Value Units Date/Time    Blood culture #2 **CANNOT BE ORDERED STAT** [[id number]] Collected: 06/28/23 1701    Order Status: Resulted Specimen: Blood Updated: 06/28/23 1733    Blood culture #1 **CANNOT BE ORDERED STAT** [[id number]] Collected: 06/28/23 1701    Order Status: Resulted Specimen: Blood Updated: 06/28/23 1733    Wound Culture [[id number]] Collected: 06/28/23 1643    Order Status: Sent Specimen: Abscess from Knee, Right Updated: 06/28/23 1645             See below for Radiology    Scheduled Med:   amitriptyline  50 mg Oral QHS    amLODIPine  10 mg Oral Daily    atorvastatin  20 mg Oral Nightly    busPIRone  5 mg Oral BID    carvediloL  12.5 mg Oral BID    doxycycline  100 mg Oral Q12H    fenofibrate  145 mg Oral Daily    FLUoxetine  20 mg Oral Daily    gabapentin  300 mg Oral BID    lisinopriL  10 mg Oral Daily    meloxicam  15 mg Oral Daily    metFORMIN  500 mg Oral BID WM    oxybutynin  5 mg Oral BID    pantoprazole  40 mg Oral Daily    piperacillin-tazobactam (Zosyn) IV (PEDS and ADULTS) (extended infusion is not appropriate)  4.5 g Intravenous Q8H    traZODone  50 mg Oral Nightly    vancomycin (VANCOCIN) IV (PEDS and ADULTS)  1,500 mg Intravenous Q12H        Continuous Infusions:       PRN Meds:  acetaminophen, cyclobenzaprine, LORazepam, melatonin, melatonin, oxyCODONE, sodium chloride 0.9%, Pharmacy to dose Vancomycin consult **AND** vancomycin - pharmacy to dose        Assessment/Plan:  Septic bursitis of the right knee status post incision and drainage   Right prepatellar bursa status post I&D  T1-T6 cord injury with paraplegia  Neurogenic bladder with suprapubic catheter   PAF on Xarelto  Type 1 diabetes mellitus  Essential hypertension  Documented hepatitis-C infection   Decubitus ulcerations  Chronic ankle wound/osteomyelitis on suppressive doxy    Continue broad-spectrum antibiotics for now  Orthopedics on board, concerned persistent infection requiring hardware removal septic emboli and complications from chronic infection  Recommended wound VAC changes starting postoperative day 3 every 3 days  Follow up intraoperative cultures   Follow up Infectious Disease recommendations  Physical therapy services once cleared by ortho   Multimodal pain control  Labs noted no leukocytosis, hemoglobin 9.1 sodium 130  Case discussed with patient's nurse, case management  Labs in the a.m.    VTE prophylaxis:  Lovenox    Patient condition:  Fair    Anticipated discharge and Disposition:   To be decided      _____________________________________________________________________    Nutrition Status:    Radiology:  I have personally reviewed the following imaging and agree with the radiologist.     CT Knee W W/O Contrast Right  Narrative: EXAMINATION:  CT KNEE W W/O CONTRAST RIGHT    CLINICAL HISTORY:  possible infection;    TECHNIQUE:  CT imaging of the right knee before and after IV contrast.  Axial, coronal and sagittal reformatted images reviewed.   Dose length product is 585 mGycm. Automatic exposure control, adjustment of mA/kV or iterative reconstruction technique used to limit radiation dose.    COMPARISON:  Radiograph 06/28/2023    FINDINGS:  Assessment mildly limited by motion.  Joint alignments are maintained with degenerative changes at the knee.  Fixation hardware in the lower femur and tibia with remote proximal fibular fracture.  Areas of heterotopic ossification along the  lower portion of the femur.  Small knee effusion.  Areas of subcutaneous edema anteriorly below the knee.  More focal area of fluid in the subcutaneous tissues of the prepatellar region measures up to 5 cm in transverse diameter and 5 cm in length.  There is image noise from the hardware, but this fluid may have thin areas of peripheral enhancement.  No tracking subcutaneous gas.  Impression: Mildly limited assessment.  5 cm area of subcutaneous fluid in the prepatellar region may have thin peripheral enhancement.  Fluid collection is possible.    Subcutaneous edema in the calf.    Electronically signed by: Gustavo Cassidy  Date:    06/28/2023  Time:    18:37  X-Ray Tibia Fibula 2 View Right  Narrative: EXAMINATION:  XR FEMUR 2 VIEW RIGHT; XR TIBIA FIBULA 2 VIEW RIGHT    CLINICAL HISTORY:  possible infection;; infection;    TECHNIQUE:  AP and lateral views of the right femur were performed.    AP and lateral views of the right tibia and fibula were obtained.    COMPARISON:  Knee radiographs 06/28/2023, tibia and fibular radiographs 08/10/2022    FINDINGS:  There are postsurgical changes of ORIF at the distal femur with lateral plate and screws.  There are postsurgical changes of ORIF at the tibia with antegrade intramedullary raven and proximal and distal interlocking screws.  Hardware appears intact.  No acute fracture seen.  There are old, healed fracture deformities.    There is chronic remodeling at the distal femur with heterogeneous appearance of the marrow and cortex distally.  There are areas of lucency as well as sclerosis.  There is no imaging through the distal femur available for comparison to evaluate for acute lytic changes superimposed on chronic background posttraumatic and postsurgical change.  Older prior radiographs of the tibia and fibula do not adequately imaged the area.  The bones are demineralized.    There is soft tissue swelling at the knee.  There is soft tissue swelling at the  ankle.  Impression: Posttraumatic and postsurgical changes at the femur and lower leg.  There is chronic deformity at the distal femur with heterogeneous sclerosis and lucency superimposed on background bony demineralization.  No old imaging of this region available for comparison.  This makes it difficult to evaluate for acute superimposed lytic change.    Postsurgical and posttraumatic change at the tibia and fibula with bony demineralization.  No appreciable acute osseous abnormality.    Electronically signed by: Tracy Zamudio  Date:    06/28/2023  Time:    18:14  X-Ray Femur 2 View Right  Narrative: EXAMINATION:  XR FEMUR 2 VIEW RIGHT; XR TIBIA FIBULA 2 VIEW RIGHT    CLINICAL HISTORY:  possible infection;; infection;    TECHNIQUE:  AP and lateral views of the right femur were performed.    AP and lateral views of the right tibia and fibula were obtained.    COMPARISON:  Knee radiographs 06/28/2023, tibia and fibular radiographs 08/10/2022    FINDINGS:  There are postsurgical changes of ORIF at the distal femur with lateral plate and screws.  There are postsurgical changes of ORIF at the tibia with antegrade intramedullary raven and proximal and distal interlocking screws.  Hardware appears intact.  No acute fracture seen.  There are old, healed fracture deformities.    There is chronic remodeling at the distal femur with heterogeneous appearance of the marrow and cortex distally.  There are areas of lucency as well as sclerosis.  There is no imaging through the distal femur available for comparison to evaluate for acute lytic changes superimposed on chronic background posttraumatic and postsurgical change.  Older prior radiographs of the tibia and fibula do not adequately imaged the area.  The bones are demineralized.    There is soft tissue swelling at the knee.  There is soft tissue swelling at the ankle.  Impression: Posttraumatic and postsurgical changes at the femur and lower leg.  There is chronic  deformity at the distal femur with heterogeneous sclerosis and lucency superimposed on background bony demineralization.  No old imaging of this region available for comparison.  This makes it difficult to evaluate for acute superimposed lytic change.    Postsurgical and posttraumatic change at the tibia and fibula with bony demineralization.  No appreciable acute osseous abnormality.    Electronically signed by: Tracy Zamudio  Date:    06/28/2023  Time:    18:14  X-Ray Knee 3 View Right  Narrative: EXAMINATION:  XR KNEE 3 VIEW RIGHT    CLINICAL HISTORY:  Effusion, right knee    COMPARISON:  None.    FINDINGS:  There has been prior internal fixation of the femur and tibia.  There are chronic changes of the bones with heterotopic ossification around the knee.  There is no acute fracture identified.  There is soft tissue swelling around the knee.  Impression: 1. No acute bony abnormality.  2. Soft tissue swelling around the knee.    Electronically signed by: Lanette Waller  Date:    06/28/2023  Time:    14:18      Yissel Proctor MD   06/29/2023

## 2023-06-29 NOTE — PLAN OF CARE
06/29/23 1423   Discharge Assessment   Assessment Type Discharge Planning Assessment   Confirmed/corrected address, phone number and insurance Yes   Confirmed Demographics Correct on Facesheet   Source of Information patient   People in Home spouse   Do you expect to return to your current living situation? Yes   Do you have help at home or someone to help you manage your care at home? Yes   Prior to hospitilization cognitive status: Alert/Oriented   Current cognitive status: Alert/Oriented   Home Accessibility wheelchair accessible   Home Layout Able to live on 1st floor   Equipment Currently Used at Home wheelchair;hospital bed   Readmission within 30 days? No   Patient currently being followed by outpatient case management? No   Do you currently have service(s) that help you manage your care at home? Yes   Name and Contact number of agency [facility]   Is the pt/caregiver preference to resume services with current agency Yes   Do you take prescription medications? Yes   Do you have prescription coverage? Yes   Do you have any problems affording any of your prescribed medications? No   Is the patient taking medications as prescribed? yes   How do you get to doctors appointments? family or friend will provide   Are you on dialysis? No   Do you take coumadin? No   Discharge Plan A Home Health   Discharge Plan B Long-term acute care facility (LTAC)   DME Needed Upon Discharge  none   Discharge Plan discussed with: Patient   Transition of Care Barriers None     Patient lives with spouse in a single story home with ramp  with spouse. Patient reports being current with [facility] and would like to resume upon DC-will send updates via [employer]. DME in home: hospital bed and WC. Will follow-up on ID recommendations for DC plan. PCP:  PRIYA Mota. Pharmacy: [facility].

## 2023-06-29 NOTE — CONSULTS
[facility] - 9th Floor Med Surg  Orthopedic Trauma  Consult Note    Patient Name: Bianca Khan  MRN: [id number]  Admission Date: 6/28/2023  Hospital Length of Stay: 1 days  Attending Provider: Rafael Tafoya MD  Primary Care Provider: Areli Vargas PA-C        Inpatient consult to Orthopedic Surgery  Consult performed by: Prabhu Shen DO  Consult ordered by: SWAPNIL Murray      Subjective:         Chief Complaint:   Chief Complaint   Patient presents with    Knee Injury     PT reports hitting R knee this weekend and reports swelling and pain to knee. Has hardware in knee from previous surgery. Swelling noted to R knee         HPI:  Patient resting comfortably.  He states he hit his right knee over the weekend and had increased swelling in this area.  ER provider pulled the purulent fluid from this area concerning for infection.  CT scan shows patient has suprapatellar bursa infection.  We will take him to the OR for exploration.  He has no pain due to long standing paralysis.  No fevers or chills.  No current drainage    Past Medical History:   Diagnosis Date    Arthritis     Chronic ulcer of ankle 05/26/2022    Frequent UTI 07/02/2019    Generalized anxiety disorder 05/26/2022    Neurogenic bladder 05/26/2022    Osteomyelitis 05/26/2022    Presence of suprapubic catheter 05/26/2022    Pure hypercholesterolemia 05/26/2022    Retention of urine, unspecified 08/09/2019    Spinal cord injury at T1-T6 level 04/20/2018       Past Surgical History:   Procedure Laterality Date    INSERTION OF INTRAMEDULLARY MARISA Right 8/10/2022    Procedure: INSERTION, INTRAMEDULLARY MARISA RIGHT TIBIA;  Surgeon: Jorge Orellana MD;  Location: [facility];  Service: Orthopedics;  Laterality: Right;       Review of patient's allergies indicates:   Allergen Reactions    Baclofen Itching and Anxiety       Current Facility-Administered Medications   Medication    acetaminophen tablet 650 mg    amitriptyline tablet 50 mg     "amLODIPine tablet 10 mg    atorvastatin tablet 20 mg    busPIRone tablet 5 mg    carvediloL tablet 12.5 mg    cyclobenzaprine tablet 10 mg    doxycycline capsule 100 mg    fenofibrate tablet 145 mg    FLUoxetine capsule 20 mg    gabapentin capsule 300 mg    lisinopriL tablet 10 mg    LORazepam tablet 1 mg    melatonin tablet 6 mg    melatonin tablet 6 mg    meloxicam tablet 15 mg    metFORMIN tablet 500 mg    oxybutynin tablet 5 mg    oxyCODONE immediate release tablet Tab 10 mg    pantoprazole EC tablet 40 mg    piperacillin-tazobactam (ZOSYN) 4.5 g in dextrose 5 % in water (D5W) 5 % 100 mL IVPB (MB+)    sodium chloride 0.9% flush 10 mL    traZODone tablet 50 mg    vancomycin - pharmacy to dose    vancomycin 1.5 g in dextrose 5 % 250 mL IVPB (ready to mix)     Family History       Problem Relation (Age of Onset)    No Known Problems Mother, Father          Tobacco Use    Smoking status: Every Day     Types: Cigars, Cigarettes    Smokeless tobacco: Never   Substance and Sexual Activity    Alcohol use: Yes     Alcohol/week: 2.0 standard drinks     Types: 2 Cans of beer per week    Drug use: Not Currently    Sexual activity: Never       ROS:  Constitutional: Denies fever chills  Eyes: No change in vision  ENT: No ringing or current infections  CV: No chest pain  Resp: No labored breathing  MSK: Pain evident at site of injury located in HPI,   Integ: No signs of abrasions or lacerations  Neuro: No numbness or tingling  Lymphatic: No swelling outside the area of injury   Objective:     Vital Signs (Most Recent):  Temp: 98.1 °F (36.7 °C) (06/29/23 0405)  Pulse: 89 (06/29/23 0405)  Resp: 16 (06/29/23 0405)  BP: 121/72 (06/29/23 0405)  SpO2: 96 % (06/29/23 0405) Vital Signs (24h Range):  Temp:  [97.7 °F (36.5 °C)-99.2 °F (37.3 °C)] 98.1 °F (36.7 °C)  Pulse:  [79-95] 89  Resp:  [16-18] 16  SpO2:  [91 %-99 %] 96 %  BP: (116-146)/(68-77) 121/72     Weight: 86.2 kg (190 lb)  Height: 5' 10" (177.8 cm)  Body mass index is 27.26 " kg/m².      Intake/Output Summary (Last 24 hours) at 6/29/2023 0732  Last data filed at 6/29/2023 0634  Gross per 24 hour   Intake --   Output 1500 ml   Net -1500 ml       Ortho/SPM Exam  General the patient is alert and oriented x3 no acute distress nontoxic-appearing appropriate affect.    Constitutional: Vital signs are examined and stable.  Resp: No signs of labored breathing                          RLE: -Skin:  Large fluctuant mass on the anterior aspect of the knee           -MSK: :  Patient is paralyzed           -Neuro:  Patient is paralyzed           -Lymphatic: No signs of lymphadenopathy           -CV: Capillary refill is less than 2 seconds. Compartments soft and compressible.     Significant Labs: All pertinent labs within the past 24 hours have been reviewed.  Recent Lab Results         06/29/23  0400   06/28/23  1701        Albumin/Globulin Ratio 0.5   0.5       Albumin 2.4   2.5       Alkaline Phosphatase 77   90       ALT 16   17       AST 18   26       Baso # 0.03   0.02       Basophil % 0.3   0.2       BILIRUBIN TOTAL 0.5   0.5       BUN 11.4   14.4       Calcium 8.6   9.0       Chloride 99   96       CO2 26   27       Creatinine 0.89   0.90       CRP   218.20       eGFR >60   >60       Eos # 0.07   0.03       Eosinophil % 0.8   0.3       Globulin, Total 4.5   5.3       Glucose 110   91       Hematocrit 29.6   27.6       Hemoglobin 9.1   8.8       Immature Grans (Abs) 0.07   0.10       Immature Granulocytes 0.8   1.0       Lymph # 1.68   1.97       LYMPH % 18.5   19.0       MCH 24.3   24.5       MCHC 30.7   31.9       MCV 79.1   76.9       Mono # 1.00   1.03       Mono % 11.0   10.0       MPV 9.2   9.7       Neut # 6.22   7.20       Neut % 68.6   69.5       nRBC 0.0   0.2       Platelets 531   488       Potassium 4.5   4.2       PROTEIN TOTAL 6.9   7.8       RBC 3.74   3.59       RDW 17.6   17.3       Sed Rate   >130       Sodium 135   131       WBC 9.07   10.35                Significant  Imaging: I have reviewed all pertinent imaging results/findings.  X-Ray Femur 2 View Right    Result Date: 6/28/2023  EXAMINATION: XR FEMUR 2 VIEW RIGHT; XR TIBIA FIBULA 2 VIEW RIGHT CLINICAL HISTORY: possible infection;; infection; TECHNIQUE: AP and lateral views of the right femur were performed. AP and lateral views of the right tibia and fibula were obtained. COMPARISON: Knee radiographs 06/28/2023, tibia and fibular radiographs 08/10/2022 FINDINGS: There are postsurgical changes of ORIF at the distal femur with lateral plate and screws.  There are postsurgical changes of ORIF at the tibia with antegrade intramedullary raven and proximal and distal interlocking screws.  Hardware appears intact.  No acute fracture seen.  There are old, healed fracture deformities. There is chronic remodeling at the distal femur with heterogeneous appearance of the marrow and cortex distally.  There are areas of lucency as well as sclerosis.  There is no imaging through the distal femur available for comparison to evaluate for acute lytic changes superimposed on chronic background posttraumatic and postsurgical change.  Older prior radiographs of the tibia and fibula do not adequately imaged the area.  The bones are demineralized. There is soft tissue swelling at the knee.  There is soft tissue swelling at the ankle.     Posttraumatic and postsurgical changes at the femur and lower leg.  There is chronic deformity at the distal femur with heterogeneous sclerosis and lucency superimposed on background bony demineralization.  No old imaging of this region available for comparison.  This makes it difficult to evaluate for acute superimposed lytic change. Postsurgical and posttraumatic change at the tibia and fibula with bony demineralization.  No appreciable acute osseous abnormality. Electronically signed by: Tracy Zamudio Date:    06/28/2023 Time:    18:14    X-Ray Knee 3 View Right    Result Date: 6/28/2023  EXAMINATION: XR KNEE  3 VIEW RIGHT CLINICAL HISTORY: Effusion, right knee COMPARISON: None. FINDINGS: There has been prior internal fixation of the femur and tibia.  There are chronic changes of the bones with heterotopic ossification around the knee.  There is no acute fracture identified.  There is soft tissue swelling around the knee.     1. No acute bony abnormality. 2. Soft tissue swelling around the knee. Electronically signed by: Lanette Waller Date:    06/28/2023 Time:    14:18    X-Ray Tibia Fibula 2 View Right    Result Date: 6/28/2023  EXAMINATION: XR FEMUR 2 VIEW RIGHT; XR TIBIA FIBULA 2 VIEW RIGHT CLINICAL HISTORY: possible infection;; infection; TECHNIQUE: AP and lateral views of the right femur were performed. AP and lateral views of the right tibia and fibula were obtained. COMPARISON: Knee radiographs 06/28/2023, tibia and fibular radiographs 08/10/2022 FINDINGS: There are postsurgical changes of ORIF at the distal femur with lateral plate and screws.  There are postsurgical changes of ORIF at the tibia with antegrade intramedullary raven and proximal and distal interlocking screws.  Hardware appears intact.  No acute fracture seen.  There are old, healed fracture deformities. There is chronic remodeling at the distal femur with heterogeneous appearance of the marrow and cortex distally.  There are areas of lucency as well as sclerosis.  There is no imaging through the distal femur available for comparison to evaluate for acute lytic changes superimposed on chronic background posttraumatic and postsurgical change.  Older prior radiographs of the tibia and fibula do not adequately imaged the area.  The bones are demineralized. There is soft tissue swelling at the knee.  There is soft tissue swelling at the ankle.     Posttraumatic and postsurgical changes at the femur and lower leg.  There is chronic deformity at the distal femur with heterogeneous sclerosis and lucency superimposed on background bony demineralization.   No old imaging of this region available for comparison.  This makes it difficult to evaluate for acute superimposed lytic change. Postsurgical and posttraumatic change at the tibia and fibula with bony demineralization.  No appreciable acute osseous abnormality. Electronically signed by: Tracy Zamudio Date:    06/28/2023 Time:    18:14    CT Knee W W/O Contrast Right    Result Date: 6/28/2023  EXAMINATION: CT KNEE W W/O CONTRAST RIGHT CLINICAL HISTORY: possible infection; TECHNIQUE: CT imaging of the right knee before and after IV contrast.  Axial, coronal and sagittal reformatted images reviewed.   Dose length product is 585 mGycm. Automatic exposure control, adjustment of mA/kV or iterative reconstruction technique used to limit radiation dose. COMPARISON: Radiograph 06/28/2023 FINDINGS: Assessment mildly limited by motion.  Joint alignments are maintained with degenerative changes at the knee.  Fixation hardware in the lower femur and tibia with remote proximal fibular fracture.  Areas of heterotopic ossification along the lower portion of the femur.  Small knee effusion.  Areas of subcutaneous edema anteriorly below the knee.  More focal area of fluid in the subcutaneous tissues of the prepatellar region measures up to 5 cm in transverse diameter and 5 cm in length.  There is image noise from the hardware, but this fluid may have thin areas of peripheral enhancement.  No tracking subcutaneous gas.     Mildly limited assessment.  5 cm area of subcutaneous fluid in the prepatellar region may have thin peripheral enhancement.  Fluid collection is possible. Subcutaneous edema in the calf. Electronically signed by: Gustavo Cassidy Date:    06/28/2023 Time:    18:37       Assessment/Plan:     Active Diagnoses:    Diagnosis Date Noted POA    PRINCIPAL PROBLEM:  Abscess of bursa of right knee [M71.061] 06/28/2023 Yes      Problems Resolved During this Admission:           Patient has large fluctuant mass on the  anterior aspect of the right knee.  No draining sinus tract currently.  Take the patient's surgery for incision and drainage in this area likely wound care following.  We will take cultures.  Patient will need IV antibiotics.  We discussed likelihood of hardware being infected it may need an amputation in the future which he is not agreeable to at this time.  All risks and benefits discussed with the patient.    I explained that surgery and the nature of their condition are not without risks. These include, but are not limited to, bleeding, infection, neurovascular compromise, malunion, nonunion, hardware complications, wound complications, scarring, cosmetic defects, need for later and/or repeated surgeries, avascular necrosis, bone death due to initial trauma, pain, loss of ROM, loss of function, PTOA, deformity, stance/gait and/or functional abnormalities, thromboembolic complications, compartment syndrome, loss of limb, loss of life, anesthetic complications, and other imponderables. I explained that these can occur despite the adequacy of treatments rendered, and that their risks are heightened given the nature of their condition. They verbalized understanding. They would like to continue with surgery at this time. If appropriate family was involved with surgical discussion.           This note/OR report was created with the assistance of  voice recognition software or phone  dictation.  There may be transcription errors as a result of using this technology however minimal. Effort has been made to assure accuracy of transcription but any obvious errors or omissions should be clarified with the author of the document.       Prabhu Shen, DO   Orthopedic Trauma Surgery  [facility] - 9th Floor Med Surg

## 2023-06-29 NOTE — TRANSFER OF CARE
"Anesthesia Transfer of Care Note    Patient: Bianca Khan    Procedure(s) Performed: Procedure(s) (LRB):  INCISION AND DRAINAGE, LOWER EXTREMITY (Right)    Patient location: PACU    Anesthesia Type: general    Transport from OR: Transported from OR on room air with adequate spontaneous ventilation    Post pain: adequate analgesia    Post assessment: no apparent anesthetic complications    Post vital signs: stable    Level of consciousness: sedated    Nausea/Vomiting: no nausea/vomiting    Complications: none    Transfer of care protocol was followed      Last vitals:   Visit Vitals  /78   Pulse 94   Temp 36.9 °C (98.5 °F) (Oral)   Resp 20   Ht 5' 10" (1.778 m)   Wt 86.2 kg (190 lb)   SpO2 (!) 94%   BMI 27.26 kg/m²     "

## 2023-06-29 NOTE — ANESTHESIA POSTPROCEDURE EVALUATION
Anesthesia Post Evaluation    Patient: Bianca Khan    Procedure(s) Performed: Procedure(s) (LRB):  INCISION AND DRAINAGE, LOWER EXTREMITY (Right)    Final Anesthesia Type: general      Patient location during evaluation: PACU  Patient participation: Yes- Able to Participate  Level of consciousness: awake and alert and oriented  Post-procedure vital signs: reviewed and stable  Pain management: adequate  Airway patency: patent    PONV status at discharge: No PONV  Anesthetic complications: no      Cardiovascular status: hemodynamically stable  Respiratory status: unassisted  Hydration status: euvolemic  Follow-up not needed.          Vitals Value Taken Time   /78 06/29/23 1141   Temp 37.0 06/29/23 1148   Pulse 81 06/29/23 1148   Resp 15 06/29/23 1147   SpO2 100 % 06/29/23 1148   Vitals shown include unvalidated device data.      Event Time   Out of Recovery 06/29/2023 11:50:00         Pain/Adriana Score: Pain Rating Prior to Med Admin: 7 (6/29/2023  1:55 AM)  Pain Rating Post Med Admin: 2 (6/29/2023  2:55 AM)  Adriana Score: 10 (6/29/2023 11:50 AM)

## 2023-06-29 NOTE — NURSING
Nurses Note -- 4 Eyes      6/29/2023   12:51 AM      Skin assessed during: Admit      [] No Altered Skin Integrity Present    []Prevention Measures Documented      [x] Yes- Altered Skin Integrity Present or Discovered   [x] LDA Added if Not in Epic (Describe Wound)   [x] New Altered Skin Integrity was Present on Admit and Documented in LDA   [x] Wound Image Taken    Wound Care Consulted? Yes    Attending Nurse:  Rachel Billings RN     Second RN/Staff Member:  Veronica Tavarez LPN
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
## 2023-06-29 NOTE — OP NOTE
OPERATIVE REPORT      Patient: Bianca Khan   : 1965    MRN: [id number]  Date: 2023      Surgeon:Prabhu Shen DO  Assistant: Grant Alexandra was essential, part of the procedure including deep hardware placement and deep closure.  No senior assistant was availible  Preoperative Diagnosis:  Right pre patellar bursa abscess, bilateral lower extremity paralysis  Postoperative Diagnosis: Same  Procedure:  Incision and drainage right knee septic arthritis 31086  Incision and drainage right  prepatellar bursa abscess  Anesthesiologist: Nabeel Tejada MD  OR Staff: Circulator: Pricila Gutierrez RN; Stephon Moreno RN  Scrub Person: NylaST Chepe  Implants: * No implants in log *  EBL:10cc  Complications: None  Disposition: To PACU, stable    Indications: Bianca Khan is a 58 y.o. male presenting with the aforementioned injuries/findings. The procedure is indicated to decrease infection load.  Patient's lower extremity paralysis 1 year ago had a right tibial nail many years ago had a distal femur fracture the hardware is in the proximal tibia and distal femur.  I am concerned that this is a septic joint and may communicate with the patellar abscess.  If this is the case patient will likely need amputation in the future.  He is not agreeable to this at this time he does agree to have an incision and drainage of the fluid collection on the anterior knee.  The patient is awake and alert. After thorough discussion of the risks, benefits, expected outcomes, and alternatives to surgical intervention, the patient agreed to proceed with surgical treatment.  Specific risks discussed included, but were not limited to: superficial or deep infection, wound healing complications, DVT/PE, significant bleeding requiring transfusion, damage to named anatomic structures in the immediate area including named neurovascular structures, infection, nonunion, malunion and general risks of anesthesia.   The patient voiced understanding and written as well as verbal consent was obtained by myself prior to the procedure.        Procedure Note:  The patient was brought back to the OR and placed supine on the OR table. After successful induction of anesthesia by anesthesia staff, the patient was positioned in the supine position and all bony prominences were padded appropriately.  The surgical field was then provisionally cleansed and then prepped and draped in the usual sterile fashion.    At this time a time-out was performed, with the correct patient, site, and procedure identified.  The universal time out as well as sign your site protocols were followed.  Preoperative antibiotics were verified as administered.     Patient has a 5 cm fluctuant mass on the anterior aspect of the knee consistent with a prepatellar bursitis.  I then incised this area with a 15 blade and a large amount of purulence came from this area.  I then tracked this down into the joint and released the joint of infection.  Patient has a large prepatellar bursa infection and septic arthritis.  This was then incised and copiously irrigated deep cultures were taken before irrigation.  Patient then received a wound VAC. it has obvious communication with a lateral plate on the distal femur    The incision(s) was/were then irrigated using copious sterile saline and then vancomyocin was added to the wound bed for prophylaxis. The surgical site(s) was/were were sterilely cleansed and dressed.    The patient was then subsequently transferred to to PACU in a stable condition.    All sponge and needle counts were correct at the end of the case.  I was present and participated in all aspects of the procedure.    Prognosis:  Patient has right lower extremity has poor prognosis for survival.  Recommendation above-the-knee amputation to remove hardware from this area along with his right ankle sore that is chronic.  Patient will likely continue to have  infection and multiple surgeries if he is not proceed with amputation.  He is not willing to accept amputation at this time he understands the risks and benefits including septic emboli sepsis and further cardiac and respiratory complications from chronic infection.  I recommend IV antibiotics infectious Disease consult due to his wrist resistant bacteria and wound VAC changes starting on postoperative day 3 every 3 days      This note/OR report was created with the assistance of  voice recognition software or phone  dictation.  There may be transcription errors as a result of using this technology however minimal. Effort has been made to assure accuracy of transcription but any obvious errors or omissions should be clarified with the author of the document.       Prabhu Shen, DO  Orthopedic Trauma Surgery

## 2023-06-29 NOTE — H&P
[facility] Hospital Medicine History & Physical Examination       Patient Name: Bianca Khan  MRN: [id number]  Patient Class: IP- Inpatient   Admission Date: 6/28/2023  1:48 PM  Length of Stay: 0  Admitting Service: Hospital Medicine   Attending Physician: Willy Martinez MD   Primary Care Provider: Areli Vargas PA-C  History source: EMR, patient and/or patient's family    CHIEF COMPLAINT   Knee Injury (PT reports hitting R knee this weekend and reports swelling and pain to knee. Has hardware in knee from previous surgery. Swelling noted to R knee )    HISTORY OF PRESENT ILLNESS:   Patient is a 58-year-old male with a history of 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, neurogenic bladder with suprapubic catheter, DM2, HTN, and additional past medical history as below who presented to the ER complaining that he struck his knee this weekend and has since had pain and swelling to this area.  He does report a prior knee surgery in the same right knee, remotely.    Patient was afebrile hemodynamically stable on arrival laboratory work showed anemia with microcytosis, and significantly elevated inflammatory markers.  CT of the right knee noted a 5 cm area subcutaneous fluid collection in the prepatellar region.  Purulent fluid was able to be aspirated in the ER per documentation.  Patient has been started on broad-spectrum antibiotics admitted to the hospitalist service with consultation to orthopedic surgery.    PAST MEDICAL HISTORY:   T1-T6 cord injury with paraplegia  Neurogenic bladder with suprapubic catheter   Type 1 diabetes mellitus  Essential hypertension  Documented hepatitis-C infection   Decubitus ulcerations    PAST SURGICAL HISTORY:     Past Surgical History:   Procedure Laterality Date    INSERTION OF INTRAMEDULLARY MARISA Right 8/10/2022    Procedure: INSERTION, INTRAMEDULLARY MARISA RIGHT TIBIA;  Surgeon: Jorge Orellana MD;  [facility]  [facility];  Service: Orthopedics;  Laterality: Right;       ALLERGIES:   Baclofen    FAMILY HISTORY:   Reviewed and non-contributory     SOCIAL HISTORY:     Social History     Tobacco Use    Smoking status: Every Day     Types: Cigars, Cigarettes    Smokeless tobacco: Never   Substance Use Topics    Alcohol use: Yes     Alcohol/week: 2.0 standard drinks     Types: 2 Cans of beer per week        HOME MEDICATIONS:     amitriptyline (ELAVIL) 50 MG tablet Take 50 mg by mouth every evening.   amLODIPine (NORVASC) 10 MG tablet 1 tablet   atorvastatin (LIPITOR) 20 MG tablet Take 20 mg by mouth nightly.   busPIRone (BUSPAR) 5 MG Tab 1 tablet   carvediloL (COREG) 12.5 MG tablet Take 12.5 mg by mouth.   cyclobenzaprine (FLEXERIL) 10 MG tablet Take 10 mg by mouth 2 (two) times daily as needed.   doxycycline (VIBRAMYCIN) 100 MG Cap Take 100 mg by mouth once daily.   fenofibrate 160 MG Tab Take 160 mg by mouth.   FLUoxetine 20 MG capsule Take 20 mg by mouth.   gabapentin (NEURONTIN) 300 MG capsule 1 capsule   gentamicin (GARAMYCIN) 0.1 % cream APPLY TO THE AFFECTED AREA(S) TOPICALLY ONCE DAILY FOR 30 DAYS AS DIRECTED   ibuprofen (ADVIL,MOTRIN) 600 MG tablet TAKE ONE TABLET TWICE DAILY AFTER MEALS FOR PAIN   insulin regular 100 unit/mL Inj injection Inject into the skin 3 (three) times daily as needed.   lisinopriL (PRINIVIL,ZESTRIL) 40 MG tablet 1 tablet   lisinopriL 10 MG tablet Take 10 mg by mouth Daily.   LORazepam (ATIVAN) 1 MG tablet Take 1 mg by mouth 2 (two) times daily.   melatonin (MELATIN) 3 mg tablet TAKE TWO TABLETS BY MOUTH EVERY NIGHT AT BEDTIME AS NEEDED FOR INSOMNIA.   meloxicam (MOBIC) 15 MG tablet Take 15 mg by mouth once daily.   metFORMIN (GLUCOPHAGE) 500 MG tablet SMARTSI Tablet(s) By Mouth Morning-Evening   oxybutynin (DITROPAN) 5 MG Tab Take 5 mg by mouth 2 (two) times daily.   oxyCODONE-acetaminophen (PERCOCET)  mg per tablet Take 1 tablet by mouth every 6 (six) hours as needed.   pantoprazole  (PROTONIX) 40 MG tablet 1 tablet   temazepam (RESTORIL) 30 mg capsule Take 30 mg by mouth nightly.   tiZANidine 4 mg Cap Take 4 mg by mouth 2 (two) times daily as needed.   traZODone (DESYREL) 50 MG tablet Take 50 mg by mouth nightly.   XARELTO 20 mg Tab SMARTSI Tablet(s) By Mouth Every Evening   isosorbide dinitrate (ISORDIL) 20 MG tablet Take 20 mg by mouth once daily.   isosorbide mononitrate (ISMO,MONOKET) 20 MG Tab Take 40 mg by mouth nightly.   linaGLIPtin (TRADJENTA) 5 mg Tab tablet Take 5 mg by mouth.   methocarbamoL (ROBAXIN) 500 MG Tab TAKE ONE TABLET BY MOUTH ONCE DAILY AND AND TAKE ONE TABLET BY MOUTH ONCE DAILY AT BEDTIME FOR MUSCLE SPASMS   nitrofurantoin, macrocrystal-monohydrate, (MACROBID) 100 MG capsule Take 100 mg by mouth.   predniSONE (DELTASONE) 10 MG tablet 5 tabs 9.13; 4 tabs 9.14; 3 tabs 9.15; 2 tabs 9.16; one tab 9.17.22 then off       REVIEW OF SYSTEMS:   Except as documented, all other systems reviewed and negative     PHYSICAL EXAM:   T 99.2 °F (37.3 °C)   /77   P 79   RR 18   O2 97 %  GENERAL: awake, alert, oriented and in no acute distress, non-toxic appearing   HEENT: normocephalic atraumatic   NECK: supple   LUNGS: Clear bilaterally, no wheezing or rales, no accessory muscle use   CVS: Regular rate and rhythm, normal peripheral perfusion  ABD: Soft, non-tender, non-distended, bowel sounds present  EXTREMITIES: no clubbing or cyanosis; significant swelling of the left knee bulging in the prepatellar region  SKIN: Warm, dry.   NEURO: alert and oriented, paraplegia  PSYCHIATRIC: Cooperative    LABS AND IMAGING:     Recent Labs     23  1701   WBC 10.35   RBC 3.59*   HGB 8.8*   HCT 27.6*   MCV 76.9*   MCH 24.5*   MCHC 31.9*   RDW 17.3*   *     No results for input(s): LACTIC in the last 72 hours.  No results for input(s): INR, APTT, D-DIMER in the last 72 hours.  No results for input(s): HGBA1C, CHOL, TRIG, LDL, VLDL, HDL in the last 72 hours.   Recent Labs      06/28/23  1701   *   K 4.2   CHLORIDE 96*   CO2 27   BUN 14.4   CREATININE 0.90   GLUCOSE 91   CALCIUM 9.0   ALBUMIN 2.5*   GLOBULIN 5.3*   ALKPHOS 90   ALT 17   AST 26   BILITOT 0.5   .20*     No results for input(s): BNP, CPK, TROPONINI in the last 72 hours.       CT Knee W W/O Contrast Right  Narrative: EXAMINATION:  CT KNEE W W/O CONTRAST RIGHT    CLINICAL HISTORY:  possible infection;    TECHNIQUE:  CT imaging of the right knee before and after IV contrast.  Axial, coronal and sagittal reformatted images reviewed.   Dose length product is 585 mGycm. Automatic exposure control, adjustment of mA/kV or iterative reconstruction technique used to limit radiation dose.    COMPARISON:  Radiograph 06/28/2023    FINDINGS:  Assessment mildly limited by motion.  Joint alignments are maintained with degenerative changes at the knee.  Fixation hardware in the lower femur and tibia with remote proximal fibular fracture.  Areas of heterotopic ossification along the lower portion of the femur.  Small knee effusion.  Areas of subcutaneous edema anteriorly below the knee.  More focal area of fluid in the subcutaneous tissues of the prepatellar region measures up to 5 cm in transverse diameter and 5 cm in length.  There is image noise from the hardware, but this fluid may have thin areas of peripheral enhancement.  No tracking subcutaneous gas.  Impression: Mildly limited assessment.  5 cm area of subcutaneous fluid in the prepatellar region may have thin peripheral enhancement.  Fluid collection is possible.    Subcutaneous edema in the calf.    Electronically signed by: Gustavo Cassidy  Date:    06/28/2023  Time:    18:37  X-Ray Tibia Fibula 2 View Right  Narrative: EXAMINATION:  XR FEMUR 2 VIEW RIGHT; XR TIBIA FIBULA 2 VIEW RIGHT    CLINICAL HISTORY:  possible infection;; infection;    TECHNIQUE:  AP and lateral views of the right femur were performed.    AP and lateral views of the right tibia and fibula were  obtained.    COMPARISON:  Knee radiographs 06/28/2023, tibia and fibular radiographs 08/10/2022    FINDINGS:  There are postsurgical changes of ORIF at the distal femur with lateral plate and screws.  There are postsurgical changes of ORIF at the tibia with antegrade intramedullary raven and proximal and distal interlocking screws.  Hardware appears intact.  No acute fracture seen.  There are old, healed fracture deformities.    There is chronic remodeling at the distal femur with heterogeneous appearance of the marrow and cortex distally.  There are areas of lucency as well as sclerosis.  There is no imaging through the distal femur available for comparison to evaluate for acute lytic changes superimposed on chronic background posttraumatic and postsurgical change.  Older prior radiographs of the tibia and fibula do not adequately imaged the area.  The bones are demineralized.    There is soft tissue swelling at the knee.  There is soft tissue swelling at the ankle.  Impression: Posttraumatic and postsurgical changes at the femur and lower leg.  There is chronic deformity at the distal femur with heterogeneous sclerosis and lucency superimposed on background bony demineralization.  No old imaging of this region available for comparison.  This makes it difficult to evaluate for acute superimposed lytic change.    Postsurgical and posttraumatic change at the tibia and fibula with bony demineralization.  No appreciable acute osseous abnormality.    Electronically signed by: Tracy Zamudio  Date:    06/28/2023  Time:    18:14  X-Ray Femur 2 View Right  Narrative: EXAMINATION:  XR FEMUR 2 VIEW RIGHT; XR TIBIA FIBULA 2 VIEW RIGHT    CLINICAL HISTORY:  possible infection;; infection;    TECHNIQUE:  AP and lateral views of the right femur were performed.    AP and lateral views of the right tibia and fibula were obtained.    COMPARISON:  Knee radiographs 06/28/2023, tibia and fibular radiographs  08/10/2022    FINDINGS:  There are postsurgical changes of ORIF at the distal femur with lateral plate and screws.  There are postsurgical changes of ORIF at the tibia with antegrade intramedullary raven and proximal and distal interlocking screws.  Hardware appears intact.  No acute fracture seen.  There are old, healed fracture deformities.    There is chronic remodeling at the distal femur with heterogeneous appearance of the marrow and cortex distally.  There are areas of lucency as well as sclerosis.  There is no imaging through the distal femur available for comparison to evaluate for acute lytic changes superimposed on chronic background posttraumatic and postsurgical change.  Older prior radiographs of the tibia and fibula do not adequately imaged the area.  The bones are demineralized.    There is soft tissue swelling at the knee.  There is soft tissue swelling at the ankle.  Impression: Posttraumatic and postsurgical changes at the femur and lower leg.  There is chronic deformity at the distal femur with heterogeneous sclerosis and lucency superimposed on background bony demineralization.  No old imaging of this region available for comparison.  This makes it difficult to evaluate for acute superimposed lytic change.    Postsurgical and posttraumatic change at the tibia and fibula with bony demineralization.  No appreciable acute osseous abnormality.    Electronically signed by: Tracy Zamuido  Date:    06/28/2023  Time:    18:14  X-Ray Knee 3 View Right  Narrative: EXAMINATION:  XR KNEE 3 VIEW RIGHT    CLINICAL HISTORY:  Effusion, right knee    COMPARISON:  None.    FINDINGS:  There has been prior internal fixation of the femur and tibia.  There are chronic changes of the bones with heterotopic ossification around the knee.  There is no acute fracture identified.  There is soft tissue swelling around the knee.  Impression: 1. No acute bony abnormality.  2. Soft tissue swelling around the  knee.    Electronically signed by: Lanette Waller  Date:    06/28/2023  Time:    14:18      ASSESSMENT & PLAN:   Septic bursitis of the right knee   T1-T6 cord injury with paraplegia  Neurogenic bladder with suprapubic catheter   PAF on Xarelto  Type 1 diabetes mellitus  Essential hypertension  Documented hepatitis-C infection   Decubitus ulcerations  Chronic ankle wound/osteomyelitis on suppressive doxy    -bcx, knee aspirate cultures sent  -broad-spectrum antibiotics   -orthopedic surgery following  -resume home medications as appropriate    DVT prophylaxis: holding Xarelto pending ortho procedure   Code status: full     If patient was admitted under observational status it is with my approval/permission.     At least 55 min was spent on this history and physical.  Time seen: 10PM 6/28/23  Willy Martinez MD

## 2023-06-29 NOTE — PLAN OF CARE
Problem: Adult Inpatient Plan of Care  Goal: Plan of Care Review  Outcome: Ongoing, Progressing  Goal: Patient-Specific Goal (Individualized)  Outcome: Ongoing, Progressing  Goal: Absence of Hospital-Acquired Illness or Injury  Outcome: Ongoing, Progressing  Goal: Optimal Comfort and Wellbeing  Outcome: Ongoing, Progressing  Goal: Readiness for Transition of Care  Outcome: Ongoing, Progressing     Problem: Diabetes Comorbidity  Goal: Blood Glucose Level Within Targeted Range  Outcome: Ongoing, Progressing     Problem: Skin Injury Risk Increased  Goal: Skin Health and Integrity  Outcome: Ongoing, Progressing     Problem: Impaired Wound Healing  Goal: Optimal Wound Healing  Outcome: Ongoing, Progressing     Problem: Pain Acute  Goal: Acceptable Pain Control and Functional Ability  Outcome: Ongoing, Progressing

## 2023-06-30 LAB
ALBUMIN SERPL-MCNC: 2.2 G/DL (ref 3.5–5)
ALBUMIN/GLOB SERPL: 0.4 RATIO (ref 1.1–2)
ALP SERPL-CCNC: 68 UNIT/L (ref 40–150)
ALT SERPL-CCNC: 13 UNIT/L (ref 0–55)
AST SERPL-CCNC: 23 UNIT/L (ref 5–34)
BASOPHILS # BLD AUTO: 0.03 X10(3)/MCL
BASOPHILS NFR BLD AUTO: 0.5 %
BILIRUBIN DIRECT+TOT PNL SERPL-MCNC: 0.4 MG/DL
BUN SERPL-MCNC: 11.9 MG/DL (ref 8.4–25.7)
CALCIUM SERPL-MCNC: 8.4 MG/DL (ref 8.4–10.2)
CHLORIDE SERPL-SCNC: 98 MMOL/L (ref 98–107)
CO2 SERPL-SCNC: 24 MMOL/L (ref 22–29)
CREAT SERPL-MCNC: 1.57 MG/DL (ref 0.73–1.18)
EOSINOPHIL # BLD AUTO: 0.08 X10(3)/MCL (ref 0–0.9)
EOSINOPHIL NFR BLD AUTO: 1.4 %
ERYTHROCYTE [DISTWIDTH] IN BLOOD BY AUTOMATED COUNT: 18.6 % (ref 11.5–17)
GFR SERPLBLD CREATININE-BSD FMLA CKD-EPI: 50 MLS/MIN/1.73/M2
GLOBULIN SER-MCNC: 5.1 GM/DL (ref 2.4–3.5)
GLUCOSE SERPL-MCNC: 113 MG/DL (ref 74–100)
GRAM STN SPEC: NORMAL
GRAM STN SPEC: NORMAL
HCT VFR BLD AUTO: 31.6 % (ref 42–52)
HGB BLD-MCNC: 9.5 G/DL (ref 14–18)
IMM GRANULOCYTES # BLD AUTO: 0.03 X10(3)/MCL (ref 0–0.04)
IMM GRANULOCYTES NFR BLD AUTO: 0.5 %
LYMPHOCYTES # BLD AUTO: 1.74 X10(3)/MCL (ref 0.6–4.6)
LYMPHOCYTES NFR BLD AUTO: 29.6 %
M AVIUM PARATB TISS QL ZN STN: NORMAL
MCH RBC QN AUTO: 24.7 PG (ref 27–31)
MCHC RBC AUTO-ENTMCNC: 30.1 G/DL (ref 33–36)
MCV RBC AUTO: 82.1 FL (ref 80–94)
MONOCYTES # BLD AUTO: 0.82 X10(3)/MCL (ref 0.1–1.3)
MONOCYTES NFR BLD AUTO: 13.9 %
NEUTROPHILS # BLD AUTO: 3.18 X10(3)/MCL (ref 2.1–9.2)
NEUTROPHILS NFR BLD AUTO: 54.1 %
NRBC BLD AUTO-RTO: 0 %
PLATELET # BLD AUTO: 451 X10(3)/MCL (ref 130–400)
PMV BLD AUTO: 10.6 FL (ref 7.4–10.4)
POTASSIUM SERPL-SCNC: 5.1 MMOL/L (ref 3.5–5.1)
PROT SERPL-MCNC: 7.3 GM/DL (ref 6.4–8.3)
RBC # BLD AUTO: 3.85 X10(6)/MCL (ref 4.7–6.1)
SODIUM SERPL-SCNC: 128 MMOL/L (ref 136–145)
VANCOMYCIN TROUGH SERPL-MCNC: 48.3 UG/ML (ref 15–20)
WBC # SPEC AUTO: 5.88 X10(3)/MCL (ref 4.5–11.5)

## 2023-06-30 PROCEDURE — 85025 COMPLETE CBC W/AUTO DIFF WBC: CPT | Performed by: PHYSICIAN ASSISTANT

## 2023-06-30 PROCEDURE — 97166 OT EVAL MOD COMPLEX 45 MIN: CPT

## 2023-06-30 PROCEDURE — 63600175 PHARM REV CODE 636 W HCPCS: Performed by: ANESTHESIOLOGY

## 2023-06-30 PROCEDURE — C9113 INJ PANTOPRAZOLE SODIUM, VIA: HCPCS | Performed by: ANESTHESIOLOGY

## 2023-06-30 PROCEDURE — 25000003 PHARM REV CODE 250: Performed by: INTERNAL MEDICINE

## 2023-06-30 PROCEDURE — 21400001 HC TELEMETRY ROOM

## 2023-06-30 PROCEDURE — 63600175 PHARM REV CODE 636 W HCPCS: Performed by: HOSPITALIST

## 2023-06-30 PROCEDURE — 63600175 PHARM REV CODE 636 W HCPCS: Performed by: PHYSICIAN ASSISTANT

## 2023-06-30 PROCEDURE — 25000003 PHARM REV CODE 250: Performed by: PHYSICIAN ASSISTANT

## 2023-06-30 PROCEDURE — 97530 THERAPEUTIC ACTIVITIES: CPT

## 2023-06-30 PROCEDURE — 80202 ASSAY OF VANCOMYCIN: CPT | Performed by: HOSPITALIST

## 2023-06-30 PROCEDURE — 97162 PT EVAL MOD COMPLEX 30 MIN: CPT

## 2023-06-30 PROCEDURE — 25000003 PHARM REV CODE 250: Performed by: HOSPITALIST

## 2023-06-30 PROCEDURE — 80053 COMPREHEN METABOLIC PANEL: CPT | Performed by: PHYSICIAN ASSISTANT

## 2023-06-30 PROCEDURE — 11000001 HC ACUTE MED/SURG PRIVATE ROOM

## 2023-06-30 RX ORDER — MICONAZOLE NITRATE 2 %
POWDER (GRAM) TOPICAL 2 TIMES DAILY
Status: DISCONTINUED | OUTPATIENT
Start: 2023-06-30 | End: 2023-08-09 | Stop reason: HOSPADM

## 2023-06-30 RX ORDER — SODIUM CHLORIDE 9 MG/ML
INJECTION, SOLUTION INTRAVENOUS CONTINUOUS
Status: ACTIVE | OUTPATIENT
Start: 2023-06-30 | End: 2023-07-02

## 2023-06-30 RX ADMIN — CYCLOBENZAPRINE 10 MG: 10 TABLET, FILM COATED ORAL at 04:06

## 2023-06-30 RX ADMIN — FLUOXETINE 20 MG: 20 CAPSULE ORAL at 09:06

## 2023-06-30 RX ADMIN — MICONAZOLE NITRATE 2 % TOPICAL POWDER: at 01:06

## 2023-06-30 RX ADMIN — DOXYCYCLINE HYCLATE 100 MG: 100 TABLET, COATED ORAL at 09:06

## 2023-06-30 RX ADMIN — OXYCODONE HYDROCHLORIDE 10 MG: 10 TABLET ORAL at 05:06

## 2023-06-30 RX ADMIN — GABAPENTIN 300 MG: 300 CAPSULE ORAL at 09:06

## 2023-06-30 RX ADMIN — TRAZODONE HYDROCHLORIDE 50 MG: 50 TABLET ORAL at 09:06

## 2023-06-30 RX ADMIN — MICONAZOLE NITRATE 2 % TOPICAL POWDER: at 09:06

## 2023-06-30 RX ADMIN — OXYCODONE HYDROCHLORIDE 10 MG: 10 TABLET ORAL at 10:06

## 2023-06-30 RX ADMIN — OXYBUTYNIN CHLORIDE 5 MG: 5 TABLET ORAL at 09:06

## 2023-06-30 RX ADMIN — METHOCARBAMOL 500 MG: 500 TABLET ORAL at 09:06

## 2023-06-30 RX ADMIN — BUSPIRONE HYDROCHLORIDE 5 MG: 5 TABLET ORAL at 09:06

## 2023-06-30 RX ADMIN — CYCLOBENZAPRINE 10 MG: 10 TABLET, FILM COATED ORAL at 09:06

## 2023-06-30 RX ADMIN — VANCOMYCIN HYDROCHLORIDE 1500 MG: 1.5 INJECTION, POWDER, LYOPHILIZED, FOR SOLUTION INTRAVENOUS at 06:06

## 2023-06-30 RX ADMIN — SODIUM CHLORIDE: 9 INJECTION, SOLUTION INTRAVENOUS at 01:06

## 2023-06-30 RX ADMIN — ATORVASTATIN CALCIUM 20 MG: 10 TABLET, FILM COATED ORAL at 09:06

## 2023-06-30 RX ADMIN — METHOCARBAMOL 500 MG: 500 TABLET ORAL at 04:06

## 2023-06-30 RX ADMIN — OXYCODONE HYDROCHLORIDE 10 MG: 10 TABLET ORAL at 09:06

## 2023-06-30 RX ADMIN — FENOFIBRATE 145 MG: 145 TABLET, FILM COATED ORAL at 09:06

## 2023-06-30 RX ADMIN — MELOXICAM 15 MG: 7.5 TABLET ORAL at 09:06

## 2023-06-30 RX ADMIN — ENOXAPARIN SODIUM 40 MG: 40 INJECTION SUBCUTANEOUS at 04:06

## 2023-06-30 RX ADMIN — PANTOPRAZOLE SODIUM 40 MG: 40 INJECTION, POWDER, LYOPHILIZED, FOR SOLUTION INTRAVENOUS at 10:06

## 2023-06-30 RX ADMIN — CARVEDILOL 12.5 MG: 12.5 TABLET, FILM COATED ORAL at 09:06

## 2023-06-30 RX ADMIN — AMITRIPTYLINE HYDROCHLORIDE 50 MG: 50 TABLET, FILM COATED ORAL at 09:06

## 2023-06-30 RX ADMIN — OXYCODONE HYDROCHLORIDE 10 MG: 10 TABLET ORAL at 04:06

## 2023-06-30 NOTE — PT/OT/SLP EVAL
Physical Therapy Evaluation    Patient Name:  Bianca Khan   MRN:  [id number]    Recommendations:     Discharge Recommendations: LTACH (long-term acute care hospital)   Discharge Equipment Recommendations: to be determined by next level of care   Barriers to discharge: Impaired mobility    Assessment:     Bianca Khan is a 58 y.o. male admitted with a medical diagnosis of Abscess of bursa of right knee and PMHx history of SCI T1-T6 with paraplegia. He presents with the following impairments/functional limitations: weakness, impaired endurance, impaired self care skills, impaired functional mobility, impaired balance, decreased lower extremity function, decreased safety awareness, decreased ROM, abnormal tone Pt tolerated evaluation well, demos good bed mobility requiring min A to assist legs off of the bed and CGA-SBA for scooting. Will continue therapy services to progress towards functional independence. Will recommend LTACH upon d/c.     Rehab Prognosis: Good; patient would benefit from acute skilled PT services to address these deficits and reach maximum level of function.    Recent Surgery: Procedure(s) (LRB):  INCISION AND DRAINAGE, LOWER EXTREMITY (Right) 1 Day Post-Op    Plan:     During this hospitalization, patient to be seen 6 x/week to address the identified rehab impairments via therapeutic activities, therapeutic exercises and progress toward the following goals:    Plan of Care Expires:       Subjective     Chief Complaint: pain  Patient/Family Comments/goals: return to PLOF  Pain/Comfort:  Pain Rating 1: 9/10  Location - Side 1: Left  Location 1: arm  Pain Addressed 1: Nurse notified    Patients cultural, spiritual, Buddhist conflicts given the current situation: no    Living Environment:  Pt lives with spouse in a SLH with ramp to entry and rails  Prior to admission, patients level of function was independent with bed mobility and transfers gets assistance from wife and family with ADL's as  needed.  Equipment used at home: wheelchair, hospital bed, and power wheelchair.  DME owned (not currently used): none.  Upon discharge, patient will have assistance from wife.    Objective:     Communicated with NSG prior to session.  Patient found HOB elevated with lentz catheter, pressure relief boots, SCD, telemetry, wound vac  upon PT entry to room.    General Precautions: Standard, fall  Orthopedic Precautions:    Braces: N/A  Respiratory Status: Room air  Blood Pressure: N/A      Exams:  RLE ROM: limited due to increased tone  LLE ROM: limited   Skin integrity: Wound on R gluteal area      Functional Mobility:  Bed Mobility:     Rolling Left:  stand by assistance  Rolling Right: stand by assistance  Scooting: minimum assistance  Supine to Sit: minimum assistance  Sit to Supine: minimum assistance  Balance: Pt sat EOB with CGA-SBA, required bilateral upper extremity support for balance. Performed scooting towards R and L and was SBA/CGA for safety.    Patient provided with verbal education regarding PT POC/roles.  Understanding was verbalized.     Patient left HOB elevated with all lines intact, call button in reach, and NSG notified.    GOALS:   Multidisciplinary Problems       Physical Therapy Goals          Problem: Physical Therapy    Goal Priority Disciplines Outcome Goal Variances Interventions   Physical Therapy Goal     PT, PT/OT Ongoing, Progressing     Description: Goals to be met by: 23     Patient will increase functional independence with mobility by performin. Supine to sit with Modified [place]  2. Sit to supine with Modified [place]  3. Rolling to Left and Right with Modified [place].  4. Bed to chair transfer with Modified [place] using No Assistive Device                         History:     Past Medical History:   Diagnosis Date    Arthritis     Chronic ulcer of ankle 2022    Frequent UTI 2019    Generalized anxiety disorder 2022     Neurogenic bladder 05/26/2022    Osteomyelitis 05/26/2022    Presence of suprapubic catheter 05/26/2022    Pure hypercholesterolemia 05/26/2022    Retention of urine, unspecified 08/09/2019    Spinal cord injury at T1-T6 level 04/20/2018       Past Surgical History:   Procedure Laterality Date    INSERTION OF INTRAMEDULLARY MARISA Right 8/10/2022    Procedure: INSERTION, INTRAMEDULLARY MARISA RIGHT TIBIA;  Surgeon: Jorge Orellana MD;  [facility];  Service: Orthopedics;  Laterality: Right;       Time Tracking:     PT Received On: 06/30/23  PT Start Time: 0957     PT Stop Time: 1029  PT Total Time (min): 32 min     Billable Minutes: Evaluation 32      06/30/2023

## 2023-06-30 NOTE — PROGRESS NOTES
"Inpatient Nutrition Evaluation    Admit Date: 6/28/2023   Total duration of encounter: 2 days    Nutrition Recommendation/Prescription     - continue diabetic diet   - will add boost max for additional nutrition; 160kcal, 30 gm protein    Nutrition Assessment     Chart Review    Reason Seen: malnutrition screening tool (MST)    Malnutrition Screening Tool Results   Have you recently lost weight without trying?: Unsure  Have you been eating poorly because of a decreased appetite?: Yes   MST Score: 3     Diagnosis:  Septic bursitis of the right knee status post incision and drainage   Right prepatellar bursa status post I&D  ANTON likely prerenal plus vancomycin induced  Hyponatremia possibly hypovolemic  Urine leakage around suprapubic catheter  Chronic ankle wound/osteomyelitis on suppressive doxy    Relevant Medical History: 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, neurogenic bladder with suprapubic catheter, DM2, HTN    Nutrition-Related Medications: metformin, pantoprazole, NaCl @ 100ml/hr    Nutrition-Related Labs:  6/30: Na 128, Crea 1.57, Alb 2.2    Diet Order: Diet diabetic  Oral Supplement Order: Boost Max  Appetite/Oral Intake: good/50-75% of meals  Factors Affecting Nutritional Intake: none identified  Food/Worship/Cultural Preferences: none reported  Food Allergies: no known food allergies    Skin Integrity: wound, drain/device(s)  Wound(s):      Altered Skin Integrity 06/28/23 2230 medial Sacral spine #1-Tissue loss description: Partial thickness       Altered Skin Integrity 06/28/23 2230 Left posterior Buttocks #4-Tissue loss description: Partial thickness     Comments    6/30: pt having procedure; tolerating oral diet previously, no weight loss noted in EMR; will add Boost Max for additional nutrition    Anthropometrics    Height: 5' 10" (177.8 cm) Height Method: Stated  Last Weight: 86.2 kg (190 lb) (06/28/23 2237) Weight Method: Standard Scale (stated)  BMI (Calculated): " 27.3  BMI Classification: overweight (BMI 25-29.9)        Ideal Body Weight (IBW), Male: 166 lb     % Ideal Body Weight, Male (lb): 114.46 %                          Usual Weight Provided By: EMR weight history    Wt Readings from Last 5 Encounters:   06/28/23 86.2 kg (190 lb)   09/13/22 86.2 kg (190 lb)   08/25/22 86.2 kg (190 lb 0.6 oz)   01/04/22 86.2 kg (189 lb 15.9 oz)     Weight Change(s) Since Admission:  Admit Weight: 86.2 kg (190 lb) (06/28/23 1346)      Patient Education    Not applicable.    Monitoring & Evaluation     Dietitian will monitor food and beverage intake.  Nutrition Risk/Follow-Up: low (follow-up in 5-7 days)  Patients assigned 'low nutrition risk' status do not qualify for a full nutritional assessment but will be monitored and re-evaluated in a 5-7 day time period. Please consult if re-evaluation needed sooner.

## 2023-06-30 NOTE — PROGRESS NOTES
Pharmacokinetic Assessment Follow Up: IV Vancomycin    Vancomycin serum concentration assessment(s):    The trough level was drawn incorrectly and cannot be used to guide therapy at this time.    Vancomycin Regimen Plan:    Discontinue the scheduled vancomycin regimen and re-dose when the random level is less than 20 mcg/mL, next level to be drawn at 0430 on 07/01. Level was drawn while vanc was running but pt did have an ANTON. Will hold dose and recheck in am.     Scheduled Administration Times    Held until trough < 20     Drug levels (last 3 results):  Recent Labs   Lab Result Units 06/30/23  0634   Vancomycin Trough ug/ml 48.3*       Vancomycin Administrations:  vancomycin given in the last 96 hours                     vancomycin 1.5 g in dextrose 5 % 250 mL IVPB (ready to mix) (mg) 1,500 mg New Bag 06/30/23 0603     1,500 mg New Bag 06/29/23 1808     1,500 mg New Bag  0654    vancomycin injection (g) 2 g Given 06/29/23 1100    vancomycin 2 g in dextrose 5 % 500 mL IVPB (mg) 2,000 mg New Bag 06/28/23 1943                    Pharmacy will continue to follow and monitor vancomycin.    Please contact pharmacy at extension [phone] for questions regarding this assessment.    Thank you for the consult,   Juliann Cormier       Patient brief summary:  Bianca Khan is a 58 y.o. male initiated on antimicrobial therapy with IV Vancomycin for treatment of bone/joint infection        Drug Allergies:   Review of patient's allergies indicates:   Allergen Reactions    Baclofen Itching and Anxiety       Actual Body Weight:   86.2kg    Renal Function:   Estimated Creatinine Clearance: 52.3 mL/min (A) (based on SCr of 1.57 mg/dL (H)).,     Dialysis Method (if applicable):  N/A    CBC (last 72 hours):  Recent Labs   Lab Result Units 06/28/23  1701 06/29/23  0400 06/30/23  0634   WBC x10(3)/mcL 10.35 9.07 5.88   Hgb g/dL 8.8* 9.1* 9.5*   Hct % 27.6* 29.6* 31.6*   Platelet x10(3)/mcL 488* 531* 451*   Mono % % 10.0 11.0 13.9   Eos % % 0.3  0.8 1.4   Basophil % % 0.2 0.3 0.5       Metabolic Panel (last 72 hours):  Recent Labs   Lab Result Units 06/28/23  1701 06/29/23  0400 06/30/23  0634   Sodium Level mmol/L 131* 135* 128*   Potassium Level mmol/L 4.2 4.5 5.1   Chloride mmol/L 96* 99 98   Carbon Dioxide mmol/L 27 26 24   Glucose Level mg/dL 91 110* 113*   Blood Urea Nitrogen mg/dL 14.4 11.4 11.9   Creatinine mg/dL 0.90 0.89 1.57*   Albumin Level g/dL 2.5* 2.4* 2.2*   Bilirubin Total mg/dL 0.5 0.5 0.4   Alkaline Phosphatase unit/L 90 77 68   Aspartate Aminotransferase unit/L 26 18 23   Alanine Aminotransferase unit/L 17 16 13       Microbiologic Results:  Microbiology Results (last 7 days)       Procedure Component Value Units Date/Time    Gram Stain [[id number]] Collected: 06/29/23 1058    Order Status: Completed Specimen: Wound from Knee, Right Updated: 06/30/23 0753     GRAM STAIN Rare WBC observed      No bacteria seen    AFB Smear [[id number]] Collected: 06/29/23 1058    Order Status: Completed Specimen: Wound from Knee, Right Updated: 06/30/23 0730     AFB Smear No AFB seen (Direct smear only)    Blood culture #1 **CANNOT BE ORDERED STAT** [[id number]]  (Normal) Collected: 06/28/23 1701    Order Status: Completed Specimen: Blood Updated: 06/29/23 1900     CULTURE, BLOOD ([facility]) No Growth At 24 Hours    Blood culture #2 **CANNOT BE ORDERED STAT** [[id number]]  (Normal) Collected: 06/28/23 1701    Order Status: Completed Specimen: Blood Updated: 06/29/23 1800     CULTURE, BLOOD ([facility]) No Growth At 24 Hours    Mycobacteria and Nocardia Culture [[id number]] Collected: 06/29/23 1058    Order Status: Sent Specimen: Wound from Knee, Right Updated: 06/29/23 1117    Fungal Culture [[id number]] Collected: 06/29/23 1058    Order Status: Sent Specimen: Wound from Knee, Right Updated: 06/29/23 1117    Anaerobic Culture [[id number]] Collected: 06/29/23 1058    Order Status: Sent Specimen: Wound from Knee, Right Updated: 06/29/23 1117    Wound Culture [[id number]]  Collected: 06/29/23 1058    Order Status: Resulted Specimen: Wound from Knee, Right Updated: 06/29/23 1117    Wound Culture [[id number]]  (Abnormal) Collected: 06/28/23 1643    Order Status: Completed Specimen: Abscess from Knee, Right Updated: 06/29/23 0929     Wound Culture Moderate Streptococcus agalactiae (Group B)

## 2023-06-30 NOTE — PLAN OF CARE
Problem: Physical Therapy  Goal: Physical Therapy Goal  Description: Goals to be met by: 23     Patient will increase functional independence with mobility by performin. Supine to sit with Modified [place]  2. Sit to supine with Modified [place]  3. Rolling to Left and Right with Modified [place].  4. Bed to chair transfer with Modified [place] using No Assistive Device    Outcome: Ongoing, Progressing

## 2023-06-30 NOTE — PROCEDURES
"Bianca Khan is a 58 y.o. male patient.    Temp: 97.7 °F (36.5 °C) (06/30/23 1039)  Pulse: 77 (06/30/23 1039)  Resp: 18 (06/30/23 1001)  BP: 128/67 (06/30/23 1039)  SpO2: 95 % (06/30/23 1039)  Weight: 86.2 kg (190 lb) (06/28/23 2237)  Height: 5' 10" (177.8 cm) (06/28/23 2237)       Bladder Cath    Date/Time: 6/30/2023 11:26 AM  Location procedure was performed: [facility]  Performed by: DESTINY Carl  Authorized by: DESTINY Carl   Pre-operative diagnosis: neurogenic bladder  Post-operative diagnosis: neurogenic bladder  Indications: catheter change, neurogenic bladder and urine output monitoring  Local anesthesia used: no    Anesthesia:  Local anesthesia used: no    Patient sedated: no  Preparation: Patient was prepped and draped in the usual sterile fashion.  Catheter insertion: SP tube.  Catheter type: Stover  Catheter size: 20 Fr (30cc)  Complicated insertion: no  Bladder irrigation: no  Number of attempts: 1  Patient tolerance: Patient tolerated the procedure well with no immediate complications        6/30/2023    "

## 2023-06-30 NOTE — PROGRESS NOTES
[facility] - 9th Floor Med Surg  Orthopedics  Progress Note    Patient Name: Bianca Khan  MRN: [id number]  Admission Date: 6/28/2023  Hospital Length of Stay: 2 days  Attending Provider: Yissel Proctor MD  Primary Care Provider: Areli Vargas PA-C  Follow-up For: Procedure(s) (LRB):  INCISION AND DRAINAGE, LOWER EXTREMITY (Right)    Post-Operative Day: 1 Day Post-Op  Subjective:     Principal Problem:Abscess of bursa of right knee    Principal Orthopedic Problem: 1 Day Post-Op   I&D right knee    Interval History: Patient with septic arthritis of the right knee s/p I&D. Intraoperative cultures taken. Wound vac placed to the knee due to concern for chronic draining tract. He denies numbness and tingling distally. States that he can feel some sensation in this area.    Review of patient's allergies indicates:   Allergen Reactions    Baclofen Itching and Anxiety       Current Facility-Administered Medications   Medication    acetaminophen tablet 650 mg    amitriptyline tablet 50 mg    amLODIPine tablet 10 mg    atorvastatin tablet 20 mg    busPIRone tablet 5 mg    carvediloL tablet 12.5 mg    cefTRIAXone (ROCEPHIN) 2 g in dextrose 5 % in water (D5W) 5 % 100 mL IVPB (MB+)    cyclobenzaprine tablet 10 mg    doxycycline tablet 100 mg    electrolyte-A infusion    enoxaparin injection 40 mg    fenofibrate tablet 145 mg    FLUoxetine capsule 20 mg    gabapentin capsule 300 mg    lisinopriL tablet 10 mg    LORazepam tablet 1 mg    melatonin tablet 6 mg    melatonin tablet 6 mg    meloxicam tablet 15 mg    metFORMIN tablet 500 mg    methocarbamoL tablet 500 mg    morphine injection 4 mg    ondansetron injection 4 mg    oxybutynin tablet 5 mg    oxyCODONE immediate release tablet 5 mg    oxyCODONE immediate release tablet Tab 10 mg    pantoprazole injection 40 mg    sodium chloride 0.9% flush 10 mL    sodium citrate-citric acid 500-334 mg/5 ml solution 30 mL    traZODone tablet 50 mg    vancomycin -  "pharmacy to dose    zolpidem tablet 5 mg     Objective:     Vital Signs (Most Recent):  Temp: 97.7 °F (36.5 °C) (06/30/23 0700)  Pulse: 74 (06/30/23 0700)  Resp: 18 (06/30/23 0700)  BP: (!) 100/58 (06/30/23 0700)  SpO2: 97 % (06/30/23 0700) Vital Signs (24h Range):  Temp:  [97.6 °F (36.4 °C)-100.2 °F (37.9 °C)] 97.7 °F (36.5 °C)  Pulse:  [] 74  Resp:  [16-22] 18  SpO2:  [65 %-100 %] 97 %  BP: (100-139)/(58-84) 100/58     Weight: 86.2 kg (190 lb)  Height: 5' 10" (177.8 cm)  Body mass index is 27.26 kg/m².      Intake/Output Summary (Last 24 hours) at 6/30/2023 0929  Last data filed at 6/30/2023 0516  Gross per 24 hour   Intake 700 ml   Output 1630 ml   Net -930 ml       Physical Exam:   Musculoskeletal:     Right lower extremity: WV in place CDI with adequate suction, ace wrap in place; compartments are soft and compressible; Tolerates gentle passive ROM at the right knee; mild tenderness to palpation; SILT distally; BCR distally        Diagnostic Findings:   Significant Labs:   Recent Lab Results  (Last 5 results in the past 72 hours)        06/30/23  0634   06/29/23  1058   06/29/23  1021   06/29/23  0400   06/28/23  1701        Albumin/Globulin Ratio 0.4       0.5   0.5       Albumin 2.2       2.4   2.5       Alkaline Phosphatase 68       77   90       ALT 13       16   17       AST 23       18   26       Baso # 0.03       0.03   0.02       Basophil % 0.5       0.3   0.2       BILIRUBIN TOTAL 0.4       0.5   0.5       BLOOD CULTURE         No Growth At 24 Hours  [P]                No Growth At 24 Hours  [P]       BUN 11.9       11.4   14.4       Calcium 8.4       8.6   9.0       Chloride 98       99   96       CO2 24       26   27       Creatinine 1.57       0.89   0.90       CRP         218.20       Wound Culture   Few Streptococcus agalactiae (Group B)  [P]             Direct Acid Fast   No AFB seen (Direct smear only)             eGFR 50       >60   >60       Eos # 0.08       0.07   0.03       Eosinophil " % 1.4       0.8   0.3       Globulin, Total 5.1       4.5   5.3       Glucose 113       110   91       Gram Stain Result   Rare WBC observed                No bacteria seen             Hematocrit 31.6       29.6   27.6       Hemoglobin 9.5       9.1   8.8       Immature Grans (Abs) 0.03       0.07   0.10       Immature Granulocytes 0.5       0.8   1.0       Lymph # 1.74       1.68   1.97       LYMPH % 29.6       18.5   19.0       MCH 24.7       24.3   24.5       MCHC 30.1       30.7   31.9       MCV 82.1       79.1   76.9       Mono # 0.82       1.00   1.03       Mono % 13.9       11.0   10.0       MPV 10.6       9.2   9.7       Neut # 3.18       6.22   7.20       Neut % 54.1       68.6   69.5       nRBC 0.0       0.0   0.2       Platelets 451       531   488       POCT Glucose     127           Potassium 5.1       4.5   4.2       PROTEIN TOTAL 7.3       6.9   7.8       RBC 3.85       3.74   3.59       RDW 18.6       17.6   17.3       Sed Rate         >130       Sodium 128       135   131       Vancomycin-Trough 48.3  Comment: Critical Result called and verified by verbal readback to: tommie mccoy on 06/30/2023 at 07:17. Reported by [id number].               WBC 5.88       9.07   10.35                               [P] - Preliminary Result                Significant Imaging: I have reviewed and interpreted all pertinent imaging results/findings.     Assessment/Plan:     Active Diagnoses:    Diagnosis Date Noted POA    PRINCIPAL PROBLEM:  Abscess of bursa of right knee [M71.061] 06/28/2023 Yes      Problems Resolved During this Admission:     H&H stable following surgery. On Vanc and Zosyn  Doing well overall. Will begin serial wound vac changes on the floor. Wound care consulted.   ID consult for help with treatment following cultures. Prelim growing Streptococcus agalactiae at this time.   Will continue to monitor through his stay. No acute complaints overall. Follow up for wound check in approx 3 weeks. Expect  that he will need long term course of IV abx therapy and long term wound care.      The above findings, diagnostics, and treatment plan were discussed with Dr. Shen who is in agreement with the plan of care except as stated in additional documentation.      Ada Alexandra PA-C  Orthopedic Trauma Surgery  [facility]

## 2023-06-30 NOTE — PROGRESS NOTES
[facility]  Hospital Medicine Progress Note        Chief Complaint: Inpatient Follow-up for right knee septic arthritis    HPI:    58-year-old male with a history of 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, neurogenic bladder with suprapubic catheter, DM2, HTN, and additional past medical history as below who presented to the ER complaining that he struck his knee this weekend and has since had pain and swelling to this area.  He does report a prior knee surgery in the same right knee, remotely.     Patient was afebrile hemodynamically stable on arrival laboratory work showed anemia with microcytosis, and significantly elevated inflammatory markers.  CT of the right knee noted a 5 cm area subcutaneous fluid collection in the prepatellar region.  Purulent fluid was able to be aspirated in the ER per documentation.  Patient has been started on broad-spectrum antibiotics admitted to the hospitalist service with consultation to orthopedic surgery.    On 06/29/2023, Patient underwent right prepatellar bursa abscess incision and drainage, incision and drainage of right knee septic arthritis wound vac was placed due to concerns for chronic draining tract , intraoperative cultures were sent.    Interval Hx:   No acute events reported overnight except urine leaking from suprapubic catheter site,seen and examined , reported pain in the right knee around 8/10 otherwise no complaints denied any nausea, vomiting, passing gas, tolerating p.o. diet.  Infectious Disease has changed the antibiotics stopped vancomycin started on ceftriaxone.  Labs this morning showed elevated vanc trough with bump in creatinine 1.5 sodium low 128.  Patient has been NPO for procedure probably contributing to bit of ANTON in addition to vancomycin.      Case was discussed with patient's nurse and  on the floor.    Objective/physical exam:  General: In no acute distress,  afebrile  Chest: Clear to auscultation bilaterally  Heart:  +S1, S2, no appreciable murmur  Abdomen: Soft, nontender, BS +  MSK:  Right knee dressing with a wound VAC noted sensations intact distally  Neurologic: Alert and oriented  VITAL SIGNS: 24 HRS MIN & MAX LAST   Temp  Min: 97.6 °F (36.4 °C)  Max: 100.2 °F (37.9 °C) 97.7 °F (36.5 °C)   BP  Min: 100/58  Max: 139/84 (!) 100/58   Pulse  Min: 73  Max: 100  74   Resp  Min: 16  Max: 22 18   SpO2  Min: 65 %  Max: 100 % 97 %     I have reviewed the following labs:    Recent Labs   Lab 06/28/23  1701 06/29/23  0400 06/30/23  0634   WBC 10.35 9.07 5.88   RBC 3.59* 3.74* 3.85*   HGB 8.8* 9.1* 9.5*   HCT 27.6* 29.6* 31.6*   MCV 76.9* 79.1* 82.1   MCH 24.5* 24.3* 24.7*   MCHC 31.9* 30.7* 30.1*   RDW 17.3* 17.6* 18.6*   * 531* 451*   MPV 9.7 9.2 10.6*       Recent Labs   Lab 06/28/23 1701 06/29/23  0400 06/30/23  0634   * 135* 128*   K 4.2 4.5 5.1   CO2 27 26 24   BUN 14.4 11.4 11.9   CREATININE 0.90 0.89 1.57*   CALCIUM 9.0 8.6 8.4   ALBUMIN 2.5* 2.4* 2.2*   ALKPHOS 90 77 68   ALT 17 16 13   AST 26 18 23   BILITOT 0.5 0.5 0.4          Microbiology Results (last 7 days)       Procedure Component Value Units Date/Time    Wound Culture [[id number]]  (Abnormal) Collected: 06/29/23 1058    Order Status: Completed Specimen: Wound from Knee, Right Updated: 06/30/23 0840     Wound Culture Few Streptococcus agalactiae (Group B)    Gram Stain [[id number]] Collected: 06/29/23 1058    Order Status: Completed Specimen: Wound from Knee, Right Updated: 06/30/23 0753     GRAM STAIN Rare WBC observed      No bacteria seen    AFB Smear [[id number]] Collected: 06/29/23 1058    Order Status: Completed Specimen: Wound from Knee, Right Updated: 06/30/23 0730     AFB Smear No AFB seen (Direct smear only)    Blood culture #1 **CANNOT BE ORDERED STAT** [[id number]]  (Normal) Collected: 06/28/23 1701    Order Status: Completed Specimen: Blood Updated: 06/29/23 1900     CULTURE, BLOOD  ([facility]) No Growth At 24 Hours    Blood culture #2 **CANNOT BE ORDERED STAT** [[id number]]  (Normal) Collected: 06/28/23 1701    Order Status: Completed Specimen: Blood Updated: 06/29/23 1800     CULTURE, BLOOD ([facility]) No Growth At 24 Hours    Mycobacteria and Nocardia Culture [[id number]] Collected: 06/29/23 1058    Order Status: Sent Specimen: Wound from Knee, Right Updated: 06/29/23 1117    Fungal Culture [[id number]] Collected: 06/29/23 1058    Order Status: Sent Specimen: Wound from Knee, Right Updated: 06/29/23 1117    Anaerobic Culture [[id number]] Collected: 06/29/23 1058    Order Status: Sent Specimen: Wound from Knee, Right Updated: 06/29/23 1117    Wound Culture [[id number]]  (Abnormal) Collected: 06/28/23 1643    Order Status: Completed Specimen: Abscess from Knee, Right Updated: 06/29/23 0929     Wound Culture Moderate Streptococcus agalactiae (Group B)             See below for Radiology    Scheduled Med:   amitriptyline  50 mg Oral QHS    amLODIPine  10 mg Oral Daily    atorvastatin  20 mg Oral Nightly    busPIRone  5 mg Oral BID    carvediloL  12.5 mg Oral BID    cefTRIAXone (ROCEPHIN) IVPB  2 g Intravenous Q24H    doxycycline  100 mg Oral Q12H    enoxaparin  40 mg Subcutaneous Daily    fenofibrate  145 mg Oral Daily    FLUoxetine  20 mg Oral Daily    gabapentin  300 mg Oral BID    lisinopriL  10 mg Oral Daily    meloxicam  15 mg Oral Daily    metFORMIN  500 mg Oral BID WM    methocarbamoL  500 mg Oral TID    oxybutynin  5 mg Oral BID    pantoprazole  40 mg Intravenous Daily    sodium citrate-citric acid 500-334 mg/5 ml  30 mL Oral Once    traZODone  50 mg Oral Nightly        Continuous Infusions:   electrolyte-A          PRN Meds:  acetaminophen, cyclobenzaprine, LORazepam, melatonin, melatonin, morphine, ondansetron, oxyCODONE, oxyCODONE, sodium chloride 0.9%, Pharmacy to dose Vancomycin consult **AND** vancomycin - pharmacy to dose, zolpidem       Assessment/Plan:  Septic bursitis of the right knee status  post incision and drainage   Right prepatellar bursa status post I&D  ANTON likely prerenal plus vancomycin induced  Hyponatremia possibly hypovolemic  Urine leakage around suprapubic catheter  T1-T6 cord injury with paraplegia  Neurogenic bladder with suprapubic catheter   PAF on Xarelto  Type 1 diabetes mellitus  Essential hypertension  Documented hepatitis-C infection   Decubitus ulcerations  Chronic ankle wound/osteomyelitis on suppressive doxy    Orthopedics ,infectious disease following, inputs noted  Intraoperative wound cultures from right knee growing group B strep infectious Disease has switch antibiotics ceftriaxone and following continue doxycycline   Consulted urology for urinary leakage around suprapubic catheter ,case discussed with Urology team   H&H stable postop but noted sodium 128, creatinine 1.57, vanc trough 48.3  We will start on IV fluids normal saline at 100 cc/hour and monitor urine output and renal function  Avoid nephrotoxic medications, discontinued lisinopril, metformin  Monitor sugars and blood pressure  Cover with insulin sliding scale as needed for high sugars  Patient is on Xarelto at home for paroxysmal AFib need to resume   PT/OT   Multimodal pain control  Case discussed with patient's nurse, case management  Labs in the a.m.    VTE prophylaxis:  Lovenox    Patient condition:  Fair    Anticipated discharge and Disposition:   To be decided likely needs placement for long-term IV antibiotic therapy      _____________________________________________________________________    Nutrition Status:    Radiology:  I have personally reviewed the following imaging and agree with the radiologist.     CT Knee W W/O Contrast Right  Narrative: EXAMINATION:  CT KNEE W W/O CONTRAST RIGHT    CLINICAL HISTORY:  possible infection;    TECHNIQUE:  CT imaging of the right knee before and after IV contrast.  Axial, coronal and sagittal reformatted images reviewed.   Dose length product is 585 mGycm.  Automatic exposure control, adjustment of mA/kV or iterative reconstruction technique used to limit radiation dose.    COMPARISON:  Radiograph 06/28/2023    FINDINGS:  Assessment mildly limited by motion.  Joint alignments are maintained with degenerative changes at the knee.  Fixation hardware in the lower femur and tibia with remote proximal fibular fracture.  Areas of heterotopic ossification along the lower portion of the femur.  Small knee effusion.  Areas of subcutaneous edema anteriorly below the knee.  More focal area of fluid in the subcutaneous tissues of the prepatellar region measures up to 5 cm in transverse diameter and 5 cm in length.  There is image noise from the hardware, but this fluid may have thin areas of peripheral enhancement.  No tracking subcutaneous gas.  Impression: Mildly limited assessment.  5 cm area of subcutaneous fluid in the prepatellar region may have thin peripheral enhancement.  Fluid collection is possible.    Subcutaneous edema in the calf.    Electronically signed by: Gustavo Cassidy  Date:    06/28/2023  Time:    18:37  X-Ray Tibia Fibula 2 View Right  Narrative: EXAMINATION:  XR FEMUR 2 VIEW RIGHT; XR TIBIA FIBULA 2 VIEW RIGHT    CLINICAL HISTORY:  possible infection;; infection;    TECHNIQUE:  AP and lateral views of the right femur were performed.    AP and lateral views of the right tibia and fibula were obtained.    COMPARISON:  Knee radiographs 06/28/2023, tibia and fibular radiographs 08/10/2022    FINDINGS:  There are postsurgical changes of ORIF at the distal femur with lateral plate and screws.  There are postsurgical changes of ORIF at the tibia with antegrade intramedullary raven and proximal and distal interlocking screws.  Hardware appears intact.  No acute fracture seen.  There are old, healed fracture deformities.    There is chronic remodeling at the distal femur with heterogeneous appearance of the marrow and cortex distally.  There are areas of lucency as well  as sclerosis.  There is no imaging through the distal femur available for comparison to evaluate for acute lytic changes superimposed on chronic background posttraumatic and postsurgical change.  Older prior radiographs of the tibia and fibula do not adequately imaged the area.  The bones are demineralized.    There is soft tissue swelling at the knee.  There is soft tissue swelling at the ankle.  Impression: Posttraumatic and postsurgical changes at the femur and lower leg.  There is chronic deformity at the distal femur with heterogeneous sclerosis and lucency superimposed on background bony demineralization.  No old imaging of this region available for comparison.  This makes it difficult to evaluate for acute superimposed lytic change.    Postsurgical and posttraumatic change at the tibia and fibula with bony demineralization.  No appreciable acute osseous abnormality.    Electronically signed by: Tracy Zamudio  Date:    06/28/2023  Time:    18:14  X-Ray Femur 2 View Right  Narrative: EXAMINATION:  XR FEMUR 2 VIEW RIGHT; XR TIBIA FIBULA 2 VIEW RIGHT    CLINICAL HISTORY:  possible infection;; infection;    TECHNIQUE:  AP and lateral views of the right femur were performed.    AP and lateral views of the right tibia and fibula were obtained.    COMPARISON:  Knee radiographs 06/28/2023, tibia and fibular radiographs 08/10/2022    FINDINGS:  There are postsurgical changes of ORIF at the distal femur with lateral plate and screws.  There are postsurgical changes of ORIF at the tibia with antegrade intramedullary raven and proximal and distal interlocking screws.  Hardware appears intact.  No acute fracture seen.  There are old, healed fracture deformities.    There is chronic remodeling at the distal femur with heterogeneous appearance of the marrow and cortex distally.  There are areas of lucency as well as sclerosis.  There is no imaging through the distal femur available for comparison to evaluate for acute lytic  changes superimposed on chronic background posttraumatic and postsurgical change.  Older prior radiographs of the tibia and fibula do not adequately imaged the area.  The bones are demineralized.    There is soft tissue swelling at the knee.  There is soft tissue swelling at the ankle.  Impression: Posttraumatic and postsurgical changes at the femur and lower leg.  There is chronic deformity at the distal femur with heterogeneous sclerosis and lucency superimposed on background bony demineralization.  No old imaging of this region available for comparison.  This makes it difficult to evaluate for acute superimposed lytic change.    Postsurgical and posttraumatic change at the tibia and fibula with bony demineralization.  No appreciable acute osseous abnormality.    Electronically signed by: Tracy Zamudio  Date:    06/28/2023  Time:    18:14  X-Ray Knee 3 View Right  Narrative: EXAMINATION:  XR KNEE 3 VIEW RIGHT    CLINICAL HISTORY:  Effusion, right knee    COMPARISON:  None.    FINDINGS:  There has been prior internal fixation of the femur and tibia.  There are chronic changes of the bones with heterotopic ossification around the knee.  There is no acute fracture identified.  There is soft tissue swelling around the knee.  Impression: 1. No acute bony abnormality.  2. Soft tissue swelling around the knee.    Electronically signed by: Lanette Waller  Date:    06/28/2023  Time:    14:18      Yissel Proctor MD   06/30/2023

## 2023-06-30 NOTE — PROGRESS NOTES
Infectious Diseases Progress Note  58-year-old male with past medical history of T-spine cord injury with paraplegia, diabetes, HTN, hepatitis-C, chronic right ankle wound/osteomyelitis, was on suppressive doxycycline, known to my service, seen by us initially at [facility] and has followed with us at the office for chronic osteomyelitis, is admitted to [facility] on 06/28/2023 presenting through the ED with complaints of pain and swelling to his right knee, apparently struck his knee.  He did also report prior remote right knee surgery.  He was evaluated and noted to have no fevers initially but a temperature of 100.2° today 6/29, no leukocytosis but with thrombocytosis of up to 531 today.  .2 and ESR >130.  He is anemic with low albumin.  Blood cultures have been negative.  CT scan of the right knee noted a 5 cm area of subcutaneous fluid collection in the prepatellar region.  There was aspiration in the ER with findings of purulent fluid and cultures showing group B Streptococcus.  He was seen by the orthopedic surgery team with findings of right prepatellar bursa abscess and is status post incision and drainage of right knee septic arthritis and right prepatellar bursa abscess today 6/29 with cultures pending.  He is on antibiotic coverage with vancomycin and ceftriaxone.       Subjective:  No new complaints, low-grade fevers noted, doing about the same.  Lying in bed in no acute distress      Past Medical History:   Diagnosis Date    Arthritis     Chronic ulcer of ankle 05/26/2022    Frequent UTI 07/02/2019    Generalized anxiety disorder 05/26/2022    Neurogenic bladder 05/26/2022    Osteomyelitis 05/26/2022    Presence of suprapubic catheter 05/26/2022    Pure hypercholesterolemia 05/26/2022    Retention of urine, unspecified 08/09/2019    Spinal cord injury at T1-T6 level 04/20/2018     Past Surgical History:   Procedure Laterality Date    INCISION AND DRAINAGE,  LOWER EXTREMITY Right 6/29/2023    Procedure: INCISION AND DRAINAGE, LOWER EXTREMITY;  Surgeon: Prabhu Shen DO;  Location: [facility] OR;  Service: Orthopedics;  Laterality: Right;  supine bone foam wash stuff cultures    INSERTION OF INTRAMEDULLARY MARISA Right 8/10/2022    Procedure: INSERTION, INTRAMEDULLARY MARISA RIGHT TIBIA;  Surgeon: Jorge Orellana MD;  Location: [facility] OR;  Service: Orthopedics;  Laterality: Right;     Social History     Socioeconomic History    Marital status:    Tobacco Use    Smoking status: Every Day     Types: Cigars, Cigarettes    Smokeless tobacco: Never   Substance and Sexual Activity    Alcohol use: Yes     Alcohol/week: 2.0 standard drinks     Types: 2 Cans of beer per week    Drug use: Not Currently    Sexual activity: Never       ROS  Constitutional:  Positive for malaise/fatigue.   HENT: Negative.     Respiratory: Negative.     Gastrointestinal: Negative.    Genitourinary: Negative.    Musculoskeletal: Negative.         R knee pain   Neurological:  Positive for weakness.   Endo/Heme/Allergies: Negative.    Psychiatric/Behavioral: Negative.     All other Systems review done and negative.    Review of patient's allergies indicates:   Allergen Reactions    Baclofen Itching and Anxiety         Scheduled Meds:   amitriptyline  50 mg Oral QHS    amLODIPine  10 mg Oral Daily    atorvastatin  20 mg Oral Nightly    busPIRone  5 mg Oral BID    carvediloL  12.5 mg Oral BID    cefTRIAXone (ROCEPHIN) IVPB  2 g Intravenous Q24H    doxycycline  100 mg Oral Q12H    enoxaparin  40 mg Subcutaneous Daily    fenofibrate  145 mg Oral Daily    FLUoxetine  20 mg Oral Daily    gabapentin  300 mg Oral BID    meloxicam  15 mg Oral Daily    methocarbamoL  500 mg Oral TID    miconazole NITRATE 2 %   Topical (Top) BID    oxybutynin  5 mg Oral BID    pantoprazole  40 mg Intravenous Daily    sodium citrate-citric acid 500-334 mg/5 ml  30 mL Oral Once    traZODone  50 mg Oral Nightly     Continuous Infusions:    "sodium chloride 0.9% 100 mL/hr at 06/30/23 1302    electrolyte-A       PRN Meds:acetaminophen, cyclobenzaprine, LORazepam, melatonin, melatonin, morphine, ondansetron, oxyCODONE, oxyCODONE, sodium chloride 0.9%, Pharmacy to dose Vancomycin consult **AND** vancomycin - pharmacy to dose, zolpidem    Objective:  /67   Pulse 77   Temp 97.7 °F (36.5 °C) (Oral)   Resp 18   Ht 5' 10" (1.778 m)   Wt 86.2 kg (190 lb)   SpO2 95%   BMI 27.26 kg/m²     Physical Exam:   Physical Exam  Vitals reviewed.   Constitutional:       General: He is not in acute distress.     Appearance: He is not toxic-appearing.   HENT:      Head: Normocephalic and atraumatic.   Cardiovascular:      Rate and Rhythm: Normal rate and regular rhythm.      Heart sounds: Normal heart sounds.   Pulmonary:      Effort: Pulmonary effort is normal. No respiratory distress.      Breath sounds: Normal breath sounds.   Abdominal:      General: Bowel sounds are normal. There is no distension.      Palpations: Abdomen is soft.      Tenderness: There is no abdominal tenderness.   Genitourinary:     Comments: Suprapubic catheter noted  Musculoskeletal:      Cervical back: Neck supple.      Comments: R knee bandaged from surgery   Skin:     Findings: No rash.      Comments: R ankle and left heel wound with no purulence.   Neurological:      Mental Status: He is alert and oriented to person, place, and time.      Comments: Paraplegic   Psychiatric:      Comments: Calm and cooperative     Imaging  Imaging Results              CT Knee W W/O Contrast Right (Final result)  Result time 06/28/23 18:37:42      Final result by Gustavo Cassidy MD (06/28/23 18:37:42)                   Impression:      Mildly limited assessment.  5 cm area of subcutaneous fluid in the prepatellar region may have thin peripheral enhancement.  Fluid collection is possible.    Subcutaneous edema in the calf.      Electronically signed by: Gustavo " Khanh  Date:    06/28/2023  Time:    18:37               Narrative:    EXAMINATION:  CT KNEE W W/O CONTRAST RIGHT    CLINICAL HISTORY:  possible infection;    TECHNIQUE:  CT imaging of the right knee before and after IV contrast.  Axial, coronal and sagittal reformatted images reviewed.   Dose length product is 585 mGycm. Automatic exposure control, adjustment of mA/kV or iterative reconstruction technique used to limit radiation dose.    COMPARISON:  Radiograph 06/28/2023    FINDINGS:  Assessment mildly limited by motion.  Joint alignments are maintained with degenerative changes at the knee.  Fixation hardware in the lower femur and tibia with remote proximal fibular fracture.  Areas of heterotopic ossification along the lower portion of the femur.  Small knee effusion.  Areas of subcutaneous edema anteriorly below the knee.  More focal area of fluid in the subcutaneous tissues of the prepatellar region measures up to 5 cm in transverse diameter and 5 cm in length.  There is image noise from the hardware, but this fluid may have thin areas of peripheral enhancement.  No tracking subcutaneous gas.                                       X-Ray Tibia Fibula 2 View Right (Final result)  Result time 06/28/23 18:14:06      Final result by Tracy Zamudio MD (06/28/23 18:14:06)                   Impression:      Posttraumatic and postsurgical changes at the femur and lower leg.  There is chronic deformity at the distal femur with heterogeneous sclerosis and lucency superimposed on background bony demineralization.  No old imaging of this region available for comparison.  This makes it difficult to evaluate for acute superimposed lytic change.    Postsurgical and posttraumatic change at the tibia and fibula with bony demineralization.  No appreciable acute osseous abnormality.      Electronically signed by: Tracy Zamudio  Date:    06/28/2023  Time:    18:14               Narrative:    EXAMINATION:  XR FEMUR 2 VIEW  RIGHT; XR TIBIA FIBULA 2 VIEW RIGHT    CLINICAL HISTORY:  possible infection;; infection;    TECHNIQUE:  AP and lateral views of the right femur were performed.    AP and lateral views of the right tibia and fibula were obtained.    COMPARISON:  Knee radiographs 06/28/2023, tibia and fibular radiographs 08/10/2022    FINDINGS:  There are postsurgical changes of ORIF at the distal femur with lateral plate and screws.  There are postsurgical changes of ORIF at the tibia with antegrade intramedullary raven and proximal and distal interlocking screws.  Hardware appears intact.  No acute fracture seen.  There are old, healed fracture deformities.    There is chronic remodeling at the distal femur with heterogeneous appearance of the marrow and cortex distally.  There are areas of lucency as well as sclerosis.  There is no imaging through the distal femur available for comparison to evaluate for acute lytic changes superimposed on chronic background posttraumatic and postsurgical change.  Older prior radiographs of the tibia and fibula do not adequately imaged the area.  The bones are demineralized.    There is soft tissue swelling at the knee.  There is soft tissue swelling at the ankle.                                       X-Ray Femur 2 View Right (Final result)  Result time 06/28/23 18:14:06      Final result by Tracy Zamudio MD (06/28/23 18:14:06)                   Impression:      Posttraumatic and postsurgical changes at the femur and lower leg.  There is chronic deformity at the distal femur with heterogeneous sclerosis and lucency superimposed on background bony demineralization.  No old imaging of this region available for comparison.  This makes it difficult to evaluate for acute superimposed lytic change.    Postsurgical and posttraumatic change at the tibia and fibula with bony demineralization.  No appreciable acute osseous abnormality.      Electronically signed by: Tracy  Robb  Date:    06/28/2023  Time:    18:14               Narrative:    EXAMINATION:  XR FEMUR 2 VIEW RIGHT; XR TIBIA FIBULA 2 VIEW RIGHT    CLINICAL HISTORY:  possible infection;; infection;    TECHNIQUE:  AP and lateral views of the right femur were performed.    AP and lateral views of the right tibia and fibula were obtained.    COMPARISON:  Knee radiographs 06/28/2023, tibia and fibular radiographs 08/10/2022    FINDINGS:  There are postsurgical changes of ORIF at the distal femur with lateral plate and screws.  There are postsurgical changes of ORIF at the tibia with antegrade intramedullary raven and proximal and distal interlocking screws.  Hardware appears intact.  No acute fracture seen.  There are old, healed fracture deformities.    There is chronic remodeling at the distal femur with heterogeneous appearance of the marrow and cortex distally.  There are areas of lucency as well as sclerosis.  There is no imaging through the distal femur available for comparison to evaluate for acute lytic changes superimposed on chronic background posttraumatic and postsurgical change.  Older prior radiographs of the tibia and fibula do not adequately imaged the area.  The bones are demineralized.    There is soft tissue swelling at the knee.  There is soft tissue swelling at the ankle.                                       X-Ray Knee 3 View Right (Final result)  Result time 06/28/23 14:18:03      Final result by Lanette Waller MD (06/28/23 14:18:03)                   Impression:      1. No acute bony abnormality.  2. Soft tissue swelling around the knee.      Electronically signed by: Lanette Waller  Date:    06/28/2023  Time:    14:18               Narrative:    EXAMINATION:  XR KNEE 3 VIEW RIGHT    CLINICAL HISTORY:  Effusion, right knee    COMPARISON:  None.    FINDINGS:  There has been prior internal fixation of the femur and tibia.  There are chronic changes of the bones with heterotopic ossification around  the knee.  There is no acute fracture identified.  There is soft tissue swelling around the knee.                                       Lab Review   Recent Results (from the past 24 hour(s))   VANCOMYCIN, TROUGH    Collection Time: 06/30/23  6:34 AM   Result Value Ref Range    Vancomycin Trough 48.3 (HH) 15.0 - 20.0 ug/ml   Comprehensive Metabolic Panel    Collection Time: 06/30/23  6:34 AM   Result Value Ref Range    Sodium Level 128 (L) 136 - 145 mmol/L    Potassium Level 5.1 3.5 - 5.1 mmol/L    Chloride 98 98 - 107 mmol/L    Carbon Dioxide 24 22 - 29 mmol/L    Glucose Level 113 (H) 74 - 100 mg/dL    Blood Urea Nitrogen 11.9 8.4 - 25.7 mg/dL    Creatinine 1.57 (H) 0.73 - 1.18 mg/dL    Calcium Level Total 8.4 8.4 - 10.2 mg/dL    Protein Total 7.3 6.4 - 8.3 gm/dL    Albumin Level 2.2 (L) 3.5 - 5.0 g/dL    Globulin 5.1 (H) 2.4 - 3.5 gm/dL    Albumin/Globulin Ratio 0.4 (L) 1.1 - 2.0 ratio    Bilirubin Total 0.4 <=1.5 mg/dL    Alkaline Phosphatase 68 40 - 150 unit/L    Alanine Aminotransferase 13 0 - 55 unit/L    Aspartate Aminotransferase 23 5 - 34 unit/L    eGFR 50 mls/min/1.73/m2   CBC with Differential    Collection Time: 06/30/23  6:34 AM   Result Value Ref Range    WBC 5.88 4.50 - 11.50 x10(3)/mcL    RBC 3.85 (L) 4.70 - 6.10 x10(6)/mcL    Hgb 9.5 (L) 14.0 - 18.0 g/dL    Hct 31.6 (L) 42.0 - 52.0 %    MCV 82.1 80.0 - 94.0 fL    MCH 24.7 (L) 27.0 - 31.0 pg    MCHC 30.1 (L) 33.0 - 36.0 g/dL    RDW 18.6 (H) 11.5 - 17.0 %    Platelet 451 (H) 130 - 400 x10(3)/mcL    MPV 10.6 (H) 7.4 - 10.4 fL    Neut % 54.1 %    Lymph % 29.6 %    Mono % 13.9 %    Eos % 1.4 %    Basophil % 0.5 %    Lymph # 1.74 0.6 - 4.6 x10(3)/mcL    Neut # 3.18 2.1 - 9.2 x10(3)/mcL    Mono # 0.82 0.1 - 1.3 x10(3)/mcL    Eos # 0.08 0 - 0.9 x10(3)/mcL    Baso # 0.03 <=0.2 x10(3)/mcL    IG# 0.03 0 - 0.04 x10(3)/mcL    IG% 0.5 %    NRBC% 0.0 %             Assessment/Plan:  1. SIRS with fevers  2. Group B Streptococcus Right knee prepatellar abscess  3.  Right knee septic arthritis  4.  Anemia/reactive thrombocytosis  5.  Paraplegia secondary to T-spine cord injury  6. History of hepatitis-C   7. Chronic right ankle wound/osteomyelitis  8.  Diabetes       -We will discontinue vancomycin and continue ceftriaxone   -Low-grade fevers noted with no leukocytosis, follow  -6/28 right knee abscess culture with Group B Streptococcus  -Seen by Orthopedic surgery team calm s/p I&D of R prepatellar bursa abscess and septic R knee with cultures yielding Group G Streptococcus  -6/28 blood cultures negative  -Multiple comorbidities, paraplegic with chronic pressure wounds, right ankle osteomyelitis with exposed bone on chronic suppressive doxycycline, which will be restarted  -Plan for a long-term coverage with ceftriaxone, about a 6 week course following inflammatory markers as well as maintaining on chronic suppressive antibiotics with doxycycline  -We will continue surgical site care per Orthopedic surgery team  -We will continue wound care  -He is to continue management of his hepatitis-C as outpatient  -Renal impairment noted, will follow with labs a.m.  -Discussed with patient and nursing staff.

## 2023-06-30 NOTE — PT/OT/SLP EVAL
Occupational Therapy  Evaluation    Name: Bianca Khan  MRN: [id number]  Admitting Diagnosis: Abscess of bursa of right knee  Recent Surgery: Procedure(s) (LRB):  INCISION AND DRAINAGE, LOWER EXTREMITY (Right) 1 Day Post-Op    Recommendations:     Discharge Recommendations: LTACH (long-term acute care hospital)  Discharge Equipment Recommendations:   (pt reports necessary equipment)  Barriers to discharge:  None    Assessment:     Bianca Khan is a 58 y.o. male with a medical diagnosis of Abscess of bursa of right knee, past medical history of 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 and neurogenic bladder status post SP tube, diabetes mellitus, hypertension, hepatitis-C.  He presents awake in bed, agreeable to therapy. Performance deficits affecting function: weakness, impaired self care skills, impaired functional mobility, impaired balance, decreased lower extremity function. Pt is able to verbalize BM schedule and asked OT to bring at home drop arm commode. This was discussed with nursing staff, but it was decided to simulate commode at the next therapy session. Pt is aware of how to complete ADLs due to chronic SCI.     Rehab Prognosis: Good; patient would benefit from acute skilled OT services to address these deficits and reach maximum level of function.       Plan:     Patient to be seen 3 x/week to address the above listed problems via self-care/home management, therapeutic activities  Plan of Care Expires: 07/31/23  Plan of Care Reviewed with: patient    Subjective     Chief Complaint: None  Patient/Family Comments/goals: To return home    Occupational Profile:  Living Environment: Pt lives in one-story home with ramp to enter. Pt utilizes drop arm bedside commode for toileting.   Previous level of function: Pt was modified independent with ADLs and tf. Pt has assistance from wife for management of catheter.   Roles and Routines: , friend  Equipment Used at Home: wheelchair (drop arm commode,  power chair), trapezius grab bar above bed, hospital bed  Assistance upon Discharge: Pt has family to assist and HH nursing.     Pain/Comfort:  Pain Rating 1: 9/10  Location - Side 1: Left  Pain Addressed 1: Nurse notified    Patients cultural, spiritual, Hindu conflicts given the current situation: no    Objective:     Communicated with: Nurse after session.  Patient found HOB elevated with pressure relief boots, lentz catheter, telemetry, wound vac, SCD upon OT entry to room.    General Precautions: Standard, fall  Orthopedic Precautions: N/A  Braces: N/A  Respiratory Status: Room air    Occupational Performance:    Bed Mobility:    Patient completed Scooting/Bridging with moderate assistance  Patient completed Supine to Sit with moderate assistance  Patient completed Sit to Supine with moderate assistance    Functional Mobility/Transfers:  Pt completed scooting EOB to simulate transfers from EOB to power chair.  Functional Mobility: Pt is unable to ambulate.       Cognitive/Visual Perceptual:  Cognitive/Psychosocial Skills:     -       Follows Commands/attention:Follows two-step commands and Follows multistep  commands    Physical Exam:  Upper Extremity Range of Motion:     -       Right Upper Extremity: WFL  -       Left Upper Extremity: WFL  Upper Extremity Strength:    -       Right Upper Extremity: 5/5  -       Left Upper Extremity: 5/5   Strength:    -       Right Upper Extremity: 5/5  -       Left Upper Extremity: 5/5    Therapeutic Positioning  Risk for acquired pressure injuries is increased due to impaired mobility and previous wounds.    OT interventions performed during the course of today's session in an effort to prevent and/or reduce acquired pressure injuries:   Education on Pressure Ulcer Prevention provided  Therapeutic positioning completed     Skin assessment: all bony prominences were assessed    Findings: known area of altered skin integrity at sacral region, B heels and ankles  banandged per wound care.    OT recommendations for therapeutic positioning throughout hospitalization:   Follow [facility] Pressure Injury Prevention Protocol  Appropriate wheelchair roho cushion on power wheelchair.       Patient Education:  Patient provided with verbal education regarding OT role/goals/POC.  Understanding was verbalized.     Patient left HOB elevated with all lines intact and call button in reach    GOALS:   Multidisciplinary Problems       Occupational Therapy Goals          Problem: Occupational Therapy    Goal Priority Disciplines Outcome Interventions   Occupational Therapy Goal     OT, PT/OT Ongoing, Progressing    Description: Goals to be met by: 7/30/23     Patient will increase functional independence with ADLs by performing:    LE Dressing with Modified [place].  Toileting from bedside commode with Modified [place] for hygiene and clothing management.   Bathing from  shower chair/bench with Modified [place].  Toilet transfer to bedside commode with Modified [place].                         History:     Past Medical History:   Diagnosis Date    Arthritis     Chronic ulcer of ankle 05/26/2022    Frequent UTI 07/02/2019    Generalized anxiety disorder 05/26/2022    Neurogenic bladder 05/26/2022    Osteomyelitis 05/26/2022    Presence of suprapubic catheter 05/26/2022    Pure hypercholesterolemia 05/26/2022    Retention of urine, unspecified 08/09/2019    Spinal cord injury at T1-T6 level 04/20/2018         Past Surgical History:   Procedure Laterality Date    INCISION AND DRAINAGE, LOWER EXTREMITY Right 6/29/2023    Procedure: INCISION AND DRAINAGE, LOWER EXTREMITY;  Surgeon: Prabhu Shen DO;  Location: [facility];  Service: Orthopedics;  Laterality: Right;  supine bone foam wash stuff cultures    INSERTION OF INTRAMEDULLARY MARISA Right 8/10/2022    Procedure: INSERTION, INTRAMEDULLARY MARISA RIGHT TIBIA;  Surgeon: Jorge Orellana MD;  Location: [facility];  Service: Orthopedics;   Laterality: Right;       Time Tracking:     OT Date of Treatment: 06/30/23  OT Start Time: 0956  OT Stop Time: 1034  OT Total Time (min): 38 min    Billable Minutes:Evaluation MOD    6/30/2023

## 2023-06-30 NOTE — PLAN OF CARE
Problem: Adult Inpatient Plan of Care  Goal: Plan of Care Review  Outcome: Ongoing, Progressing  Goal: Patient-Specific Goal (Individualized)  Outcome: Ongoing, Progressing  Goal: Absence of Hospital-Acquired Illness or Injury  Outcome: Ongoing, Progressing  Goal: Optimal Comfort and Wellbeing  Outcome: Ongoing, Progressing  Goal: Readiness for Transition of Care  Outcome: Ongoing, Progressing     Problem: Diabetes Comorbidity  Goal: Blood Glucose Level Within Targeted Range  Outcome: Ongoing, Progressing     Problem: Skin Injury Risk Increased  Goal: Skin Health and Integrity  Outcome: Ongoing, Progressing     Problem: Impaired Wound Healing  Goal: Optimal Wound Healing  Outcome: Ongoing, Progressing     Problem: Pain Acute  Goal: Acceptable Pain Control and Functional Ability  Outcome: Ongoing, Progressing     Problem: Infection  Goal: Absence of Infection Signs and Symptoms  Outcome: Ongoing, Progressing

## 2023-06-30 NOTE — CONSULTS
Infectious Diseases Consultation       Inpatient consult to Infectious Diseases  Consult performed by: Mitch Butler MD  Consult ordered by: Ada Alexandra PA-C      HPI:   58-year-old male with past medical history of T-spine cord injury with paraplegia, diabetes, HTN, hepatitis-C, chronic right ankle wound/osteomyelitis, was on suppressive doxycycline, known to my service, seen by us initially at [facility] and has followed with us at the office for chronic osteomyelitis, is admitted to [facility] on 06/28/2023 presenting through the ED with complaints of pain and swelling to his right knee, apparently struck his knee.  He did also report prior remote right knee surgery.  He was evaluated and noted to have no fevers initially but a temperature of 100.2° today 6/29, no leukocytosis but with thrombocytosis of up to 531 today.  .2 and ESR >130.  He is anemic with low albumin.  Blood cultures have been negative.  CT scan of the right knee noted a 5 cm area of subcutaneous fluid collection in the prepatellar region.  There was aspiration in the ER with findings of purulent fluid and cultures showing group B Streptococcus.  He was seen by the orthopedic surgery team with findings of right prepatellar bursa abscess and is status post incision and drainage of right knee septic arthritis and right prepatellar bursa abscess today 6/29 with cultures pending.  He is on antibiotic coverage with vancomycin and Zosyn.    Past Medical and Surgical History  Allergies :   Baclofen    Medical :   He has a past medical history of Arthritis, Chronic ulcer of ankle (05/26/2022), Frequent UTI (07/02/2019), Generalized anxiety disorder (05/26/2022), Neurogenic bladder (05/26/2022), Osteomyelitis (05/26/2022), Presence of suprapubic catheter (05/26/2022), Pure hypercholesterolemia (05/26/2022), Retention of urine, unspecified (08/09/2019), and Spinal cord injury at T1-T6 level  (04/20/2018).    Surgical :   He has a past surgical history that includes Insertion of intramedullary raven (Right, 8/10/2022).     Family History  His family history includes No Known Problems in his father and mother.    Social History  He reports that he has been smoking cigars and cigarettes. He has never used smokeless tobacco. He reports current alcohol use of about 2.0 standard drinks per week. He reports that he does not currently use drugs.     Review of Systems   Constitutional:  Positive for malaise/fatigue.   HENT: Negative.     Respiratory: Negative.     Gastrointestinal: Negative.    Genitourinary: Negative.    Musculoskeletal: Negative.         R knee pain   Neurological:  Positive for weakness.   Endo/Heme/Allergies: Negative.    Psychiatric/Behavioral: Negative.     All other Systems review done and negative.    Objective   Physical Exam  Vitals reviewed.   Constitutional:       General: He is not in acute distress.     Appearance: He is not toxic-appearing.   HENT:      Head: Normocephalic and atraumatic.      Mouth/Throat:      Comments: Poor dentition  Eyes:      Pupils: Pupils are equal, round, and reactive to light.   Cardiovascular:      Rate and Rhythm: Normal rate and regular rhythm.      Heart sounds: Normal heart sounds.   Pulmonary:      Effort: Pulmonary effort is normal. No respiratory distress.      Breath sounds: Normal breath sounds.   Abdominal:      General: Bowel sounds are normal. There is no distension.      Palpations: Abdomen is soft.      Tenderness: There is no abdominal tenderness.   Genitourinary:     Comments: Suprapubic catheter noted  Musculoskeletal:      Cervical back: Neck supple.      Comments: R knee bandaged from surgery   Skin:     Findings: No rash.      Comments: R ankle and left heel wound with no purulence.   Neurological:      Mental Status: He is alert and oriented to person, place, and time.      Comments: Paraplegic   Psychiatric:      Comments: Calm and  "cooperative     VITAL SIGNS: 24 HR MIN & MAX LAST    Temp  Min: 97.7 °F (36.5 °C)  Max: 100.2 °F (37.9 °C)  100.2 °F (37.9 °C)        BP  Min: 113/68  Max: 146/77  114/75     Pulse  Min: 76  Max: 100  91     Resp  Min: 16  Max: 22  18    SpO2  Min: 65 %  Max: 100 %  (!) 94 %      HT: 5' 10" (177.8 cm)  WT: 86.2 kg (190 lb)  BMI: 27.3     Recent Results (from the past 24 hour(s))   Comprehensive metabolic panel    Collection Time: 06/29/23  4:00 AM   Result Value Ref Range    Sodium Level 135 (L) 136 - 145 mmol/L    Potassium Level 4.5 3.5 - 5.1 mmol/L    Chloride 99 98 - 107 mmol/L    Carbon Dioxide 26 22 - 29 mmol/L    Glucose Level 110 (H) 74 - 100 mg/dL    Blood Urea Nitrogen 11.4 8.4 - 25.7 mg/dL    Creatinine 0.89 0.73 - 1.18 mg/dL    Calcium Level Total 8.6 8.4 - 10.2 mg/dL    Protein Total 6.9 6.4 - 8.3 gm/dL    Albumin Level 2.4 (L) 3.5 - 5.0 g/dL    Globulin 4.5 (H) 2.4 - 3.5 gm/dL    Albumin/Globulin Ratio 0.5 (L) 1.1 - 2.0 ratio    Bilirubin Total 0.5 <=1.5 mg/dL    Alkaline Phosphatase 77 40 - 150 unit/L    Alanine Aminotransferase 16 0 - 55 unit/L    Aspartate Aminotransferase 18 5 - 34 unit/L    eGFR >60 mls/min/1.73/m2   CBC with Differential    Collection Time: 06/29/23  4:00 AM   Result Value Ref Range    WBC 9.07 4.50 - 11.50 x10(3)/mcL    RBC 3.74 (L) 4.70 - 6.10 x10(6)/mcL    Hgb 9.1 (L) 14.0 - 18.0 g/dL    Hct 29.6 (L) 42.0 - 52.0 %    MCV 79.1 (L) 80.0 - 94.0 fL    MCH 24.3 (L) 27.0 - 31.0 pg    MCHC 30.7 (L) 33.0 - 36.0 g/dL    RDW 17.6 (H) 11.5 - 17.0 %    Platelet 531 (H) 130 - 400 x10(3)/mcL    MPV 9.2 7.4 - 10.4 fL    Neut % 68.6 %    Lymph % 18.5 %    Mono % 11.0 %    Eos % 0.8 %    Basophil % 0.3 %    Lymph # 1.68 0.6 - 4.6 x10(3)/mcL    Neut # 6.22 2.1 - 9.2 x10(3)/mcL    Mono # 1.00 0.1 - 1.3 x10(3)/mcL    Eos # 0.07 0 - 0.9 x10(3)/mcL    Baso # 0.03 <=0.2 x10(3)/mcL    IG# 0.07 (H) 0 - 0.04 x10(3)/mcL    IG% 0.8 %    NRBC% 0.0 %   POCT glucose    Collection Time: 06/29/23 10:21 AM "   Result Value Ref Range    POCT Glucose 127 (H) 70 - 110 mg/dL       Imaging  Imaging Results              CT Knee W W/O Contrast Right (Final result)  Result time 06/28/23 18:37:42      Final result by Gustavo Cassidy MD (06/28/23 18:37:42)                   Impression:      Mildly limited assessment.  5 cm area of subcutaneous fluid in the prepatellar region may have thin peripheral enhancement.  Fluid collection is possible.    Subcutaneous edema in the calf.      Electronically signed by: Gustavo Cassidy  Date:    06/28/2023  Time:    18:37               Narrative:    EXAMINATION:  CT KNEE W W/O CONTRAST RIGHT    CLINICAL HISTORY:  possible infection;    TECHNIQUE:  CT imaging of the right knee before and after IV contrast.  Axial, coronal and sagittal reformatted images reviewed.   Dose length product is 585 mGycm. Automatic exposure control, adjustment of mA/kV or iterative reconstruction technique used to limit radiation dose.    COMPARISON:  Radiograph 06/28/2023    FINDINGS:  Assessment mildly limited by motion.  Joint alignments are maintained with degenerative changes at the knee.  Fixation hardware in the lower femur and tibia with remote proximal fibular fracture.  Areas of heterotopic ossification along the lower portion of the femur.  Small knee effusion.  Areas of subcutaneous edema anteriorly below the knee.  More focal area of fluid in the subcutaneous tissues of the prepatellar region measures up to 5 cm in transverse diameter and 5 cm in length.  There is image noise from the hardware, but this fluid may have thin areas of peripheral enhancement.  No tracking subcutaneous gas.                                       X-Ray Tibia Fibula 2 View Right (Final result)  Result time 06/28/23 18:14:06      Final result by Tracy Zamudio MD (06/28/23 18:14:06)                   Impression:      Posttraumatic and postsurgical changes at the femur and lower leg.  There is chronic deformity at the distal  femur with heterogeneous sclerosis and lucency superimposed on background bony demineralization.  No old imaging of this region available for comparison.  This makes it difficult to evaluate for acute superimposed lytic change.    Postsurgical and posttraumatic change at the tibia and fibula with bony demineralization.  No appreciable acute osseous abnormality.      Electronically signed by: Tracy Zamudio  Date:    06/28/2023  Time:    18:14               Narrative:    EXAMINATION:  XR FEMUR 2 VIEW RIGHT; XR TIBIA FIBULA 2 VIEW RIGHT    CLINICAL HISTORY:  possible infection;; infection;    TECHNIQUE:  AP and lateral views of the right femur were performed.    AP and lateral views of the right tibia and fibula were obtained.    COMPARISON:  Knee radiographs 06/28/2023, tibia and fibular radiographs 08/10/2022    FINDINGS:  There are postsurgical changes of ORIF at the distal femur with lateral plate and screws.  There are postsurgical changes of ORIF at the tibia with antegrade intramedullary raven and proximal and distal interlocking screws.  Hardware appears intact.  No acute fracture seen.  There are old, healed fracture deformities.    There is chronic remodeling at the distal femur with heterogeneous appearance of the marrow and cortex distally.  There are areas of lucency as well as sclerosis.  There is no imaging through the distal femur available for comparison to evaluate for acute lytic changes superimposed on chronic background posttraumatic and postsurgical change.  Older prior radiographs of the tibia and fibula do not adequately imaged the area.  The bones are demineralized.    There is soft tissue swelling at the knee.  There is soft tissue swelling at the ankle.                                       X-Ray Femur 2 View Right (Final result)  Result time 06/28/23 18:14:06      Final result by Tracy Zamudio MD (06/28/23 18:14:06)                   Impression:      Posttraumatic and postsurgical  changes at the femur and lower leg.  There is chronic deformity at the distal femur with heterogeneous sclerosis and lucency superimposed on background bony demineralization.  No old imaging of this region available for comparison.  This makes it difficult to evaluate for acute superimposed lytic change.    Postsurgical and posttraumatic change at the tibia and fibula with bony demineralization.  No appreciable acute osseous abnormality.      Electronically signed by: Tracy Zamudio  Date:    06/28/2023  Time:    18:14               Narrative:    EXAMINATION:  XR FEMUR 2 VIEW RIGHT; XR TIBIA FIBULA 2 VIEW RIGHT    CLINICAL HISTORY:  possible infection;; infection;    TECHNIQUE:  AP and lateral views of the right femur were performed.    AP and lateral views of the right tibia and fibula were obtained.    COMPARISON:  Knee radiographs 06/28/2023, tibia and fibular radiographs 08/10/2022    FINDINGS:  There are postsurgical changes of ORIF at the distal femur with lateral plate and screws.  There are postsurgical changes of ORIF at the tibia with antegrade intramedullary raven and proximal and distal interlocking screws.  Hardware appears intact.  No acute fracture seen.  There are old, healed fracture deformities.    There is chronic remodeling at the distal femur with heterogeneous appearance of the marrow and cortex distally.  There are areas of lucency as well as sclerosis.  There is no imaging through the distal femur available for comparison to evaluate for acute lytic changes superimposed on chronic background posttraumatic and postsurgical change.  Older prior radiographs of the tibia and fibula do not adequately imaged the area.  The bones are demineralized.    There is soft tissue swelling at the knee.  There is soft tissue swelling at the ankle.                                       X-Ray Knee 3 View Right (Final result)  Result time 06/28/23 14:18:03      Final result by Lanette Waller MD (06/28/23  14:18:03)                   Impression:      1. No acute bony abnormality.  2. Soft tissue swelling around the knee.      Electronically signed by: Lanette Waller  Date:    06/28/2023  Time:    14:18               Narrative:    EXAMINATION:  XR KNEE 3 VIEW RIGHT    CLINICAL HISTORY:  Effusion, right knee    COMPARISON:  None.    FINDINGS:  There has been prior internal fixation of the femur and tibia.  There are chronic changes of the bones with heterotopic ossification around the knee.  There is no acute fracture identified.  There is soft tissue swelling around the knee.                                       Impression  1. SIRS with fevers  2. Group B Streptococcus Right knee prepatellar abscess  3. Right knee septic arthritis  4.  Anemia/reactive thrombocytosis  5.  Paraplegia secondary to T-spine cord injury  6. History of hepatitis-C   7. Chronic right ankle wound/osteomyelitis  8.  Diabetes      Recommendations  I agree with the management of this patient.  History of multiple comorbidities, paraplegic with chronic pressure wounds, right ankle osteomyelitis with exposed bone on chronic suppressive doxycycline, is presenting with right knee pain with findings prepatellar abscess and septic arthritis, seen by Orthopedic surgery and status post source control by way of incision and drainage with surgical cultures pending.  Wound drainage cultures yielding group B Streptococcus.  We will continue antibiotics with discontinuation of Zosyn, with the addition of ceftriaxone and keeping on vancomycin, reducing potential nephrotoxicity of the regimen.  We will follow optimize antibiotics with discontinuation of vancomycin if no new findings to justify continued use with plan for a long-term coverage with ceftriaxone, about a 6 week course following inflammatory markers as well as maintaining on chronic suppressive antibiotics with doxycycline.  We will continue surgical site care per Orthopedic surgery team.  We will  continue wound care.  He is to continue management of his hepatitis-C as outpatient.  Case is discussed at length with patient and nursing staff.  I would like to thank the team much for the opportunity to assist in the care this patient.

## 2023-06-30 NOTE — CONSULTS
Bianca Khan 1965  [id number]  6/30/2023    CONSULTING PHYSICIAN: Yissel Proctor MD    REASON FOR CONSULTATION: Suprapubic is leaking around site    HPI:  The patient is a 58-year-old male with past medical history of 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 and neurogenic bladder status post SP tube, diabetes mellitus, hypertension, hepatitis-C who presented to the emergency room on 06/28/2023 with pain and swelling after striking his knee.  Purulent fluid was aspirated in the ER and he was started on broad-spectrum antibiotics. Cultures with Group B Strep. S/p I&D right knee with ortho on 6/29/2023, now with wound vac.  Labs this morning WBC 5.8, H&H 9.5/31.6, sodium 128, BUN and creatinine 11.9/1.57 (Cr 0.89 yesterday); 630 mL urine output overnight; VSS, afebrile.    Urology consulted d/t leakage around SPT site and decreased output overnight/this morning. He is a patient of Dr. Simental. He has his SP tube exchanged every 2 weeks per NSI. The patient reports he has not been taking in much fluids by mouth. His catheter was last exchanged on 6/21/2023. He denies any discomfort at SP tube site.    Past Medical History:   Diagnosis Date    Arthritis     Chronic ulcer of ankle 05/26/2022    Frequent UTI 07/02/2019    Generalized anxiety disorder 05/26/2022    Neurogenic bladder 05/26/2022    Osteomyelitis 05/26/2022    Presence of suprapubic catheter 05/26/2022    Pure hypercholesterolemia 05/26/2022    Retention of urine, unspecified 08/09/2019    Spinal cord injury at T1-T6 level 04/20/2018     Past Surgical History:   Procedure Laterality Date    INSERTION OF INTRAMEDULLARY MARISA Right 8/10/2022    Procedure: INSERTION, INTRAMEDULLARY MARISA RIGHT TIBIA;  Surgeon: Jorge Orellana MD;  Location: [facility];  Service: Orthopedics;  Laterality: Right;     Family History   Problem Relation Age of Onset    No Known Problems Mother     No Known Problems Father      Social History     Tobacco Use    Smoking  status: Every Day     Types: Cigars, Cigarettes    Smokeless tobacco: Never   Substance Use Topics    Alcohol use: Yes     Alcohol/week: 2.0 standard drinks     Types: 2 Cans of beer per week    Drug use: Not Currently     Current Facility-Administered Medications   Medication Dose Route Frequency Provider Last Rate Last Admin    acetaminophen tablet 650 mg  650 mg Oral Q8H PRN Willy Martinez MD   650 mg at 06/29/23 2057    amitriptyline tablet 50 mg  50 mg Oral QHS Willy Martinez MD        amLODIPine tablet 10 mg  10 mg Oral Daily Willy Martinez MD   10 mg at 06/29/23 1340    atorvastatin tablet 20 mg  20 mg Oral Nightly Willy Martinez MD   20 mg at 06/29/23 2057    busPIRone tablet 5 mg  5 mg Oral BID Willy Martinez MD   5 mg at 06/29/23 2057    carvediloL tablet 12.5 mg  12.5 mg Oral BID Willy Martinez MD   12.5 mg at 06/29/23 1340    cefTRIAXone (ROCEPHIN) 2 g in dextrose 5 % in water (D5W) 5 % 100 mL IVPB (MB+)  2 g Intravenous Q24H Mitch Butler MD   Stopped at 06/29/23 2318    cyclobenzaprine tablet 10 mg  10 mg Oral BID PRN Willy Martinez MD   10 mg at 06/30/23 0440    doxycycline tablet 100 mg  100 mg Oral Q12H Willy Martinez MD   100 mg at 06/29/23 2200    electrolyte-A infusion   Intravenous Continuous Nabeel Tejada MD        enoxaparin injection 40 mg  40 mg Subcutaneous Daily Ada Alexandra PA-C   40 mg at 06/29/23 1948    fenofibrate tablet 145 mg  145 mg Oral Daily Willy Martinez MD   145 mg at 06/29/23 1340    FLUoxetine capsule 20 mg  20 mg Oral Daily Willy Martinez MD   20 mg at 06/29/23 1340    gabapentin capsule 300 mg  300 mg Oral BID Willy Martinez MD   300 mg at 06/29/23 2056    lisinopriL tablet 10 mg  10 mg Oral Daily Willy Martinez MD   10 mg at 06/29/23 1808    LORazepam tablet 1 mg  1 mg Oral Q12H PRN Willy Martinez MD        melatonin tablet 6 mg  6 mg Oral Nightly PRN Willy RODRIGES  MD Juan        melatonin tablet 6 mg  6 mg Oral Nightly PRN Willy aMrtinez MD        meloxicam tablet 15 mg  15 mg Oral Daily Willy Martinez MD   15 mg at 06/29/23 1340    metFORMIN tablet 500 mg  500 mg Oral BID WM Willy Martinez MD   500 mg at 06/29/23 1808    methocarbamoL tablet 500 mg  500 mg Oral TID DIEGO GodinezC   500 mg at 06/29/23 2056    morphine injection 4 mg  4 mg Intravenous Q6H PRN Ada Alexandra PA-C        ondansetron injection 4 mg  4 mg Intravenous Q6H PRN Ada Alexandra PA-C        oxybutynin tablet 5 mg  5 mg Oral BID Willy Martinez MD   5 mg at 06/29/23 2057    oxyCODONE immediate release tablet 5 mg  5 mg Oral Q6H PRN Ada Alexandra PA-C        oxyCODONE immediate release tablet Tab 10 mg  10 mg Oral Q4H PRN Ada Alexandra PA-C   10 mg at 06/30/23 0440    pantoprazole injection 40 mg  40 mg Intravenous Daily Nabeel Tejada MD   40 mg at 06/29/23 1339    sodium chloride 0.9% flush 10 mL  10 mL Intravenous PRN Willy Martinez MD        sodium citrate-citric acid 500-334 mg/5 ml solution 30 mL  30 mL Oral Once Nabeel Tejada MD        traZODone tablet 50 mg  50 mg Oral Nightly Willy Martinez MD   50 mg at 06/29/23 2057    vancomycin - pharmacy to dose   Intravenous pharmacy to manage frequency SWAPNIL Murray        zolpidem tablet 5 mg  5 mg Oral Nightly PRN Adin Ambrosio, JAVIP   5 mg at 06/29/23 2200     Review of patient's allergies indicates:   Allergen Reactions    Baclofen Itching and Anxiety       ROS: 12 point review of systems negative other than the HPI    PHYSICAL EXAM:  Vitals:    06/30/23 0038 06/30/23 0440 06/30/23 0507 06/30/23 0700   BP: 112/73  101/67 (!) 100/58   Pulse: 73  74 74   Resp: 16 18 16 18   Temp: 98.4 °F (36.9 °C)  97.6 °F (36.4 °C) 97.7 °F (36.5 °C)   TempSrc: Oral  Oral Oral   SpO2: (!) 94%  96% 97%   Weight:       Height:           Intake/Output Summary (Last 24  hours) at 6/30/2023 0850  Last data filed at 6/30/2023 0516  Gross per 24 hour   Intake 700 ml   Output 1630 ml   Net -930 ml       GEN: WN/WD NAD  HEENT: normocephalic, atraumatic, PERRLA, EOMI, OP clear, nares patent  CV: RRR  RESP: Even and unlabored  ABD: soft, NTND.   : SPT site c/d/I. Minimal urine in  bag. Stover flushed with minimal output with sediment. SPT exchanged at bedside without issues, no urine obtained. Bladder scan with 34ml.   EXT: wound vac to knee  NEURO: AAO x4; paraplegia    LABS:  Recent Results (from the past 24 hour(s))   POCT glucose    Collection Time: 06/29/23 10:21 AM   Result Value Ref Range    POCT Glucose 127 (H) 70 - 110 mg/dL   AFB Smear    Collection Time: 06/29/23 10:58 AM    Specimen: Knee, Right; Wound   Result Value Ref Range    AFB Smear No AFB seen (Direct smear only)    Gram Stain    Collection Time: 06/29/23 10:58 AM    Specimen: Knee, Right; Wound   Result Value Ref Range    GRAM STAIN Rare WBC observed     GRAM STAIN No bacteria seen    Wound Culture    Collection Time: 06/29/23 10:58 AM    Specimen: Knee, Right; Wound   Result Value Ref Range    Wound Culture Few Streptococcus agalactiae (Group B) (A)    VANCOMYCIN, TROUGH    Collection Time: 06/30/23  6:34 AM   Result Value Ref Range    Vancomycin Trough 48.3 (HH) 15.0 - 20.0 ug/ml   Comprehensive Metabolic Panel    Collection Time: 06/30/23  6:34 AM   Result Value Ref Range    Sodium Level 128 (L) 136 - 145 mmol/L    Potassium Level 5.1 3.5 - 5.1 mmol/L    Chloride 98 98 - 107 mmol/L    Carbon Dioxide 24 22 - 29 mmol/L    Glucose Level 113 (H) 74 - 100 mg/dL    Blood Urea Nitrogen 11.9 8.4 - 25.7 mg/dL    Creatinine 1.57 (H) 0.73 - 1.18 mg/dL    Calcium Level Total 8.4 8.4 - 10.2 mg/dL    Protein Total 7.3 6.4 - 8.3 gm/dL    Albumin Level 2.2 (L) 3.5 - 5.0 g/dL    Globulin 5.1 (H) 2.4 - 3.5 gm/dL    Albumin/Globulin Ratio 0.4 (L) 1.1 - 2.0 ratio    Bilirubin Total 0.4 <=1.5 mg/dL    Alkaline Phosphatase 68 40 -  150 unit/L    Alanine Aminotransferase 13 0 - 55 unit/L    Aspartate Aminotransferase 23 5 - 34 unit/L    eGFR 50 mls/min/1.73/m2   CBC with Differential    Collection Time: 06/30/23  6:34 AM   Result Value Ref Range    WBC 5.88 4.50 - 11.50 x10(3)/mcL    RBC 3.85 (L) 4.70 - 6.10 x10(6)/mcL    Hgb 9.5 (L) 14.0 - 18.0 g/dL    Hct 31.6 (L) 42.0 - 52.0 %    MCV 82.1 80.0 - 94.0 fL    MCH 24.7 (L) 27.0 - 31.0 pg    MCHC 30.1 (L) 33.0 - 36.0 g/dL    RDW 18.6 (H) 11.5 - 17.0 %    Platelet 451 (H) 130 - 400 x10(3)/mcL    MPV 10.6 (H) 7.4 - 10.4 fL    Neut % 54.1 %    Lymph % 29.6 %    Mono % 13.9 %    Eos % 1.4 %    Basophil % 0.5 %    Lymph # 1.74 0.6 - 4.6 x10(3)/mcL    Neut # 3.18 2.1 - 9.2 x10(3)/mcL    Mono # 0.82 0.1 - 1.3 x10(3)/mcL    Eos # 0.08 0 - 0.9 x10(3)/mcL    Baso # 0.03 <=0.2 x10(3)/mcL    IG# 0.03 0 - 0.04 x10(3)/mcL    IG% 0.5 %    NRBC% 0.0 %       IMAGING:  none      ASSESSMENT:  Neurogenic bladder s/t paraplegia with chronic SP tube  ANTON  Right knee septic arthritis s/p I&D - Cx with group B strep  A-fib, on Xarelto    PLAN:  SP tube exchanged without issues, but still without any urine output. Bladder scan with 34ml. Discussed with Hospitalist - will begin some IVF, encouraged oral intake as well; vanc trough yesterday 48. Will monitor UOP over next few hours and bladder scan again if output does not increase. CT abdomen/pelvis order for further evaluation. Discussed with nursing.         Kristin Quinn NP

## 2023-06-30 NOTE — PLAN OF CARE
Problem: Occupational Therapy  Goal: Occupational Therapy Goal  Description: Goals to be met by: 7/30/23     Patient will increase functional independence with ADLs by performing:    LE Dressing with Modified Major.  Toileting from bedside commode with Modified Major for hygiene and clothing management.   Bathing from  shower chair/bench with Modified Major.  Toilet transfer to bedside commode with Modified Major.    Outcome: Ongoing, Progressing

## 2023-06-30 NOTE — PLAN OF CARE
Problem: Occupational Therapy  Goal: Occupational Therapy Goal  6/30/2023 1246 by Lucia Tripp, SOT  Outcome: Ongoing, Progressing  6/30/2023 1244 by Lucia Tripp SOT  Outcome: Ongoing, Progressing  6/30/2023 1243 by Lucia Tripp, SOT  Outcome: Ongoing, Progressing  6/30/2023 1243 by Lucia Tripp SOT  Outcome: Ongoing, Progressing  6/30/2023 1242 by Lucia Tripp SOT  Outcome: Ongoing, Progressing   ,

## 2023-06-30 NOTE — NURSING
Subjective:      Patient ID: Bianca Khan is a 58 y.o. male.    Chief Complaint: Knee Injury (PT reports hitting R knee this weekend and reports swelling and pain to knee. Has hardware in knee from previous surgery. Swelling noted to R knee )    HPI  Review of Systems   Objective:     Physical Exam   Assessment:     1. Foreign body of knee with infection, right, initial encounter    2. Swelling of right knee joint    3. Abscess of bursa of right knee           Negative Pressure Wound Therapy  06/29/23 1120 Right anterior (Active)   06/29/23 1120   Side: Right   Orientation: anterior   Location: Leg   Additional Comments:    Removal Indication and Assessment:    Location:    SDO Location:    NPWT Type Vacuum Therapy 06/30/23 1101   Therapy Setting NPWT Continuous therapy 06/30/23 1101   Pressure Setting NPWT 125 mmHg 06/30/23 1101   Therapy Interventions NPWT Seal intact 06/30/23 0800   General Output (mL) 60 06/30/23 0800            Altered Skin Integrity 06/28/23 2230 medial Sacral spine #1 (Active)   06/28/23 2230   Altered Skin Integrity Present on Admission - Did Patient arrive to the hospital with altered skin?: yes   Side:    Orientation: medial   Location: Sacral spine   Wound Number: #1   Is this injury device related?: No   Primary Wound Type:    Description of Altered Skin Integrity:    Ankle-Brachial Index:    Pulses:    Removal Indication and Assessment:    Wound Outcome:    (Retired) Wound Length (cm):    (Retired) Wound Width (cm):    (Retired) Depth (cm):    Wound Description (Comments):    Removal Indications:    Wound Image   06/30/23 1101   Description of Altered Skin Integrity Partial thickness tissue loss. Shallow open ulcer with a red or pink wound bed, without slough. Intact or Open/Ruptured Serum-filled blister. 06/30/23 0800   Dressing Appearance Intact;Dried drainage 06/30/23 1101   Drainage Amount Scant 06/30/23 1101   Drainage Characteristics/Odor Serosanguineous;No odor 06/30/23 1101    Appearance Pink;Dry 06/30/23 1101   Tissue loss description Partial thickness 06/30/23 1101   Red (%), Wound Tissue Color 100 % 06/30/23 1101   Wound Edges Irregular 06/30/23 1101   Wound Length (cm) 4.5 cm 06/30/23 1101   Wound Width (cm) 4.5 cm 06/30/23 1101   Wound Surface Area (cm^2) 20.25 cm^2 06/30/23 1101   Care Cleansed with:;Wound cleanser 06/30/23 1101   Dressing Calcium alginate;Gauze 06/30/23 1101            Altered Skin Integrity 06/28/23 2230 Right posterior Buttocks #2 (Active)   06/28/23 2230   Altered Skin Integrity Present on Admission - Did Patient arrive to the hospital with altered skin?: yes   Side: Right   Orientation: posterior   Location: Buttocks   Wound Number: #2   Is this injury device related?:    Primary Wound Type:    Description of Altered Skin Integrity:    Ankle-Brachial Index:    Pulses:    Removal Indication and Assessment:    Wound Outcome:    (Retired) Wound Length (cm):    (Retired) Wound Width (cm):    (Retired) Depth (cm):    Wound Description (Comments):    Removal Indications:    Wound Image   06/28/23 2235   Description of Altered Skin Integrity Partial thickness tissue loss. Shallow open ulcer with a red or pink wound bed, without slough. Intact or Open/Ruptured Serum-filled blister. 06/30/23 0800   Dressing Appearance Open to air 06/30/23 0800   Drainage Amount Scant 06/30/23 0800   Drainage Characteristics/Odor Serosanguineous 06/30/23 0800   Appearance Dressing in place, unable to visualize 06/30/23 0800   Care Cleansed with:;Sterile normal saline 06/30/23 0800   Dressing Calcium alginate;Gauze 06/30/23 0800            Altered Skin Integrity 06/28/23 2230 midline Perineum #3 (Active)   06/28/23 2230   Altered Skin Integrity Present on Admission - Did Patient arrive to the hospital with altered skin?: yes   Side:    Orientation: midline   Location: Perineum   Wound Number: #3   Is this injury device related?: No   Primary Wound Type:    Description of Altered Skin  Integrity:    Ankle-Brachial Index:    Pulses:    Removal Indication and Assessment:    Wound Outcome:    (Retired) Wound Length (cm):    (Retired) Wound Width (cm):    (Retired) Depth (cm):    Wound Description (Comments):    Removal Indications:    Wound Image   06/28/23 2235   Description of Altered Skin Integrity Partial thickness tissue loss. Shallow open ulcer with a red or pink wound bed, without slough. Intact or Open/Ruptured Serum-filled blister. 06/30/23 0800   Dressing Appearance Open to air 06/30/23 0800   Drainage Amount Small 06/28/23 2235   Drainage Characteristics/Odor Serosanguineous 06/28/23 2235   Appearance Dressing in place, unable to visualize 06/29/23 2000   Care Cleansed with:;Sterile normal saline 06/30/23 0800            Altered Skin Integrity 06/28/23 2230 Left posterior Buttocks #4 (Active)   06/28/23 2230   Altered Skin Integrity Present on Admission - Did Patient arrive to the hospital with altered skin?: yes   Side: Left   Orientation: posterior   Location: Buttocks   Wound Number: #4   Is this injury device related?:    Primary Wound Type:    Description of Altered Skin Integrity:    Ankle-Brachial Index:    Pulses:    Removal Indication and Assessment:    Wound Outcome:    (Retired) Wound Length (cm):    (Retired) Wound Width (cm):    (Retired) Depth (cm):    Wound Description (Comments):    Removal Indications:    Wound Image   06/30/23 1101   Description of Altered Skin Integrity Partial thickness tissue loss. Shallow open ulcer with a red or pink wound bed, without slough. Intact or Open/Ruptured Serum-filled blister. 06/30/23 0800   Dressing Appearance Intact;Dried drainage 06/30/23 1101   Drainage Amount Scant 06/30/23 1101   Drainage Characteristics/Odor Serosanguineous;No odor 06/30/23 1101   Appearance Pink;Red;Moist 06/30/23 1101   Tissue loss description Partial thickness 06/30/23 1101   Red (%), Wound Tissue Color 100 % 06/30/23 1101   Periwound Area Macerated 06/30/23  1101   Wound Edges Irregular 06/30/23 1101   Wound Length (cm) 3 cm 06/30/23 1101   Wound Width (cm) 3 cm 06/30/23 1101   Wound Surface Area (cm^2) 9 cm^2 06/30/23 1101   Care Cleansed with:;Wound cleanser 06/30/23 1101   Dressing Calcium alginate;Gauze 06/30/23 1101            Altered Skin Integrity 06/28/23 2230 Left posterior Heel #5 (Active)   06/28/23 2230   Altered Skin Integrity Present on Admission - Did Patient arrive to the hospital with altered skin?: yes   Side: Left   Orientation: posterior   Location: Heel   Wound Number: #5   Is this injury device related?: No   Primary Wound Type:    Description of Altered Skin Integrity:    Ankle-Brachial Index:    Pulses:    Removal Indication and Assessment:    Wound Outcome:    (Retired) Wound Length (cm):    (Retired) Wound Width (cm):    (Retired) Depth (cm):    Wound Description (Comments):    Removal Indications:    Wound Image   06/30/23 1101   Dressing Appearance Dry;Intact 06/30/23 1101   Drainage Amount None 06/30/23 1101   Drainage Characteristics/Odor No odor 06/30/23 1101   Appearance Dry 06/30/23 1101   Periwound Area Dry 06/30/23 1101   Care Cleansed with:;Wound cleanser;Applied:;Moisturizing agent 06/30/23 1101   Dressing Rolled gauze 06/30/23 1101            Altered Skin Integrity 06/28/23 2230 Right lateral Ankle #6 (Active)   06/28/23 2230   Altered Skin Integrity Present on Admission - Did Patient arrive to the hospital with altered skin?: yes   Side: Right   Orientation: lateral   Location: Ankle   Wound Number: #6   Is this injury device related?: No   Primary Wound Type:    Description of Altered Skin Integrity:    Ankle-Brachial Index:    Pulses:    Removal Indication and Assessment:    Wound Outcome:    (Retired) Wound Length (cm):    (Retired) Wound Width (cm):    (Retired) Depth (cm):    Wound Description (Comments):    Removal Indications:    Wound Image   06/30/23 1101   Description of Altered Skin Integrity Partial thickness tissue  loss. Shallow open ulcer with a red or pink wound bed, without slough. Intact or Open/Ruptured Serum-filled blister. 06/30/23 1101   Dressing Appearance Intact;Moist drainage 06/30/23 1101   Drainage Amount Small 06/30/23 1101   Drainage Characteristics/Odor Serosanguineous;No odor 06/30/23 1101   Appearance Red;Moist 06/30/23 1101   Wound Length (cm) 4 cm 06/30/23 1101   Wound Width (cm) 3 cm 06/30/23 1101   Wound Depth (cm) 0.6 cm 06/30/23 1101   Wound Volume (cm^3) 7.2 cm^3 06/30/23 1101   Wound Surface Area (cm^2) 12 cm^2 06/30/23 1101   Care Cleansed with:;Sterile normal saline 06/30/23 1101   Dressing Calcium alginate;Gauze 06/30/23 1101            Incision/Site 06/29/23 1103 Right Leg (Active)   06/29/23 1103   Present Prior to Hospital Arrival?:    Side: Right   Location: Leg   Orientation:    Incision Type:    Closure Method:    Additional Comments:    Removal Indication and Assessment:    Wound Outcome:    Removal Indications:    Incision WDL WDL 06/29/23 1126   Dressing Appearance Intact 06/30/23 1101   Drainage Amount Scant 06/30/23 1101   Appearance Dressing in place, unable to visualize 06/30/23 0800   Periwound Area Intact;Dry 06/29/23 1126   Dressing Transparent film 06/29/23 2000       Plan:            WOCN consult-59 y/o male in with bursa of right knee with surgical intervention now with wd vac.  He is also complicated with paraplegia from past and multiple skin issues.  Assessment with nurse Shoy.   Care initiated and low airloss support ordered.  He is to be turned q2hrs and offloaded per devices.  Wd vac to be changed on Monday.

## 2023-07-01 LAB
ALBUMIN SERPL-MCNC: 2 G/DL (ref 3.5–5)
ALBUMIN/GLOB SERPL: 0.5 RATIO (ref 1.1–2)
ALP SERPL-CCNC: 65 UNIT/L (ref 40–150)
ALT SERPL-CCNC: 9 UNIT/L (ref 0–55)
AST SERPL-CCNC: 15 UNIT/L (ref 5–34)
BACTERIA SPEC CULT: ABNORMAL
BASOPHILS # BLD AUTO: 0.01 X10(3)/MCL
BASOPHILS NFR BLD AUTO: 0.2 %
BILIRUBIN DIRECT+TOT PNL SERPL-MCNC: 0.3 MG/DL
BUN SERPL-MCNC: 20.1 MG/DL (ref 8.4–25.7)
CALCIUM SERPL-MCNC: 7.6 MG/DL (ref 8.4–10.2)
CHLORIDE SERPL-SCNC: 93 MMOL/L (ref 98–107)
CO2 SERPL-SCNC: 25 MMOL/L (ref 22–29)
CREAT SERPL-MCNC: 3.58 MG/DL (ref 0.73–1.18)
CREAT UR-MCNC: 31.3 MG/DL (ref 63–166)
EOSINOPHIL # BLD AUTO: 0.08 X10(3)/MCL (ref 0–0.9)
EOSINOPHIL NFR BLD AUTO: 1.4 %
ERYTHROCYTE [DISTWIDTH] IN BLOOD BY AUTOMATED COUNT: 17.4 % (ref 11.5–17)
GFR SERPLBLD CREATININE-BSD FMLA CKD-EPI: 19 MLS/MIN/1.73/M2
GLOBULIN SER-MCNC: 3.7 GM/DL (ref 2.4–3.5)
GLUCOSE SERPL-MCNC: 94 MG/DL (ref 74–100)
HCT VFR BLD AUTO: 27.2 % (ref 42–52)
HGB BLD-MCNC: 8.2 G/DL (ref 14–18)
IMM GRANULOCYTES # BLD AUTO: 0.03 X10(3)/MCL (ref 0–0.04)
IMM GRANULOCYTES NFR BLD AUTO: 0.5 %
LYMPHOCYTES # BLD AUTO: 1.47 X10(3)/MCL (ref 0.6–4.6)
LYMPHOCYTES NFR BLD AUTO: 26.5 %
MCH RBC QN AUTO: 24 PG (ref 27–31)
MCHC RBC AUTO-ENTMCNC: 30.1 G/DL (ref 33–36)
MCV RBC AUTO: 79.8 FL (ref 80–94)
MONOCYTES # BLD AUTO: 0.52 X10(3)/MCL (ref 0.1–1.3)
MONOCYTES NFR BLD AUTO: 9.4 %
NEUTROPHILS # BLD AUTO: 3.43 X10(3)/MCL (ref 2.1–9.2)
NEUTROPHILS NFR BLD AUTO: 62 %
NRBC BLD AUTO-RTO: 0 %
PLATELET # BLD AUTO: 520 X10(3)/MCL (ref 130–400)
PMV BLD AUTO: 9.3 FL (ref 7.4–10.4)
POTASSIUM SERPL-SCNC: 4.3 MMOL/L (ref 3.5–5.1)
PROT SERPL-MCNC: 5.7 GM/DL (ref 6.4–8.3)
RBC # BLD AUTO: 3.41 X10(6)/MCL (ref 4.7–6.1)
SODIUM SERPL-SCNC: 128 MMOL/L (ref 136–145)
SODIUM UR-SCNC: 77 MMOL/L
WBC # SPEC AUTO: 5.54 X10(3)/MCL (ref 4.5–11.5)

## 2023-07-01 PROCEDURE — 21400001 HC TELEMETRY ROOM

## 2023-07-01 PROCEDURE — 25000003 PHARM REV CODE 250: Performed by: INTERNAL MEDICINE

## 2023-07-01 PROCEDURE — 84300 ASSAY OF URINE SODIUM: CPT | Performed by: INTERNAL MEDICINE

## 2023-07-01 PROCEDURE — 11000001 HC ACUTE MED/SURG PRIVATE ROOM

## 2023-07-01 PROCEDURE — 85025 COMPLETE CBC W/AUTO DIFF WBC: CPT | Performed by: PHYSICIAN ASSISTANT

## 2023-07-01 PROCEDURE — 63600175 PHARM REV CODE 636 W HCPCS: Performed by: PHYSICIAN ASSISTANT

## 2023-07-01 PROCEDURE — 25000003 PHARM REV CODE 250: Performed by: PHYSICIAN ASSISTANT

## 2023-07-01 PROCEDURE — 80053 COMPREHEN METABOLIC PANEL: CPT | Performed by: PHYSICIAN ASSISTANT

## 2023-07-01 PROCEDURE — 63600175 PHARM REV CODE 636 W HCPCS: Performed by: INTERNAL MEDICINE

## 2023-07-01 PROCEDURE — 63600175 PHARM REV CODE 636 W HCPCS: Performed by: ANESTHESIOLOGY

## 2023-07-01 PROCEDURE — C9113 INJ PANTOPRAZOLE SODIUM, VIA: HCPCS | Performed by: ANESTHESIOLOGY

## 2023-07-01 PROCEDURE — 82570 ASSAY OF URINE CREATININE: CPT | Performed by: INTERNAL MEDICINE

## 2023-07-01 RX ADMIN — OXYCODONE HYDROCHLORIDE 10 MG: 10 TABLET ORAL at 07:07

## 2023-07-01 RX ADMIN — MORPHINE SULFATE 4 MG: 10 INJECTION, SOLUTION INTRAMUSCULAR; INTRAVENOUS at 02:07

## 2023-07-01 RX ADMIN — CYCLOBENZAPRINE 10 MG: 10 TABLET, FILM COATED ORAL at 04:07

## 2023-07-01 RX ADMIN — TRAZODONE HYDROCHLORIDE 50 MG: 50 TABLET ORAL at 09:07

## 2023-07-01 RX ADMIN — FLUOXETINE 20 MG: 20 CAPSULE ORAL at 09:07

## 2023-07-01 RX ADMIN — METHOCARBAMOL 500 MG: 500 TABLET ORAL at 09:07

## 2023-07-01 RX ADMIN — ENOXAPARIN SODIUM 40 MG: 40 INJECTION SUBCUTANEOUS at 04:07

## 2023-07-01 RX ADMIN — MICONAZOLE NITRATE 2 % TOPICAL POWDER: at 09:07

## 2023-07-01 RX ADMIN — GABAPENTIN 300 MG: 300 CAPSULE ORAL at 09:07

## 2023-07-01 RX ADMIN — SODIUM CHLORIDE: 9 INJECTION, SOLUTION INTRAVENOUS at 11:07

## 2023-07-01 RX ADMIN — MICONAZOLE NITRATE 2 % TOPICAL POWDER: at 11:07

## 2023-07-01 RX ADMIN — CEFTRIAXONE SODIUM 2 G: 2 INJECTION, POWDER, FOR SOLUTION INTRAMUSCULAR; INTRAVENOUS at 11:07

## 2023-07-01 RX ADMIN — METHOCARBAMOL 500 MG: 500 TABLET ORAL at 02:07

## 2023-07-01 RX ADMIN — CARVEDILOL 12.5 MG: 12.5 TABLET, FILM COATED ORAL at 09:07

## 2023-07-01 RX ADMIN — ATORVASTATIN CALCIUM 20 MG: 10 TABLET, FILM COATED ORAL at 09:07

## 2023-07-01 RX ADMIN — DOXYCYCLINE HYCLATE 100 MG: 100 TABLET, COATED ORAL at 09:07

## 2023-07-01 RX ADMIN — AMITRIPTYLINE HYDROCHLORIDE 50 MG: 50 TABLET, FILM COATED ORAL at 09:07

## 2023-07-01 RX ADMIN — PANTOPRAZOLE SODIUM 40 MG: 40 INJECTION, POWDER, LYOPHILIZED, FOR SOLUTION INTRAVENOUS at 11:07

## 2023-07-01 RX ADMIN — OXYCODONE HYDROCHLORIDE 10 MG: 10 TABLET ORAL at 09:07

## 2023-07-01 RX ADMIN — CEFTRIAXONE SODIUM 2 G: 2 INJECTION, POWDER, FOR SOLUTION INTRAMUSCULAR; INTRAVENOUS at 12:07

## 2023-07-01 RX ADMIN — MORPHINE SULFATE 4 MG: 10 INJECTION, SOLUTION INTRAMUSCULAR; INTRAVENOUS at 12:07

## 2023-07-01 RX ADMIN — OXYBUTYNIN CHLORIDE 5 MG: 5 TABLET ORAL at 09:07

## 2023-07-01 RX ADMIN — AMLODIPINE BESYLATE 10 MG: 5 TABLET ORAL at 09:07

## 2023-07-01 NOTE — PROGRESS NOTES
[facility] Hospital Medicine Progress Note        Chief Complaint: Inpatient Follow-up for right knee septic arthritis    HPI:    58-year-old male with a history of 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, neurogenic bladder with suprapubic catheter, DM2, HTN, and additional past medical history as below who presented to the ER complaining that he struck his knee this weekend and has since had pain and swelling to this area.  He does report a prior knee surgery in the same right knee, remotely.     Patient was afebrile hemodynamically stable on arrival laboratory work showed anemia with microcytosis, and significantly elevated inflammatory markers.  CT of the right knee noted a 5 cm area subcutaneous fluid collection in the prepatellar region.  Purulent fluid was able to be aspirated in the ER per documentation.  Patient has been started on broad-spectrum antibiotics admitted to the hospitalist service with consultation to orthopedic surgery.    On 06/29/2023, Patient underwent right prepatellar bursa abscess incision and drainage, incision and drainage of right knee septic arthritis wound vac was placed due to concerns for chronic draining tract , intraoperative cultures were sent.    Interval Hx:   Reported decreased urine out put even after SP cath change, CT abd pelvis no obstruction noted, seen and examined, pt feeling better except pain at operated site, denied any nausea, vomiting, pt with clear mentation      Case was discussed with patient's nurse .    Objective/physical exam:  General: In no acute distress, afebrile  Chest: Clear to auscultation bilaterally  Heart:  +S1, S2, no appreciable murmur  Abdomen: Soft, nontender, BS +, SP site secured dressing noted  MSK:  Right knee dressing with a wound VAC noted sensations intact distally  Neurologic: Alert and oriented  VITAL SIGNS: 24 HRS MIN & MAX LAST   Temp  Min: 97.6 °F (36.4 °C)  Max: 98 °F  (36.7 °C) 97.6 °F (36.4 °C)   BP  Min: 128/67  Max: 155/87 131/75   Pulse  Min: 77  Max: 95  82   Resp  Min: 18  Max: 18 18   SpO2  Min: 90 %  Max: 96 % (!) 90 %     I have reviewed the following labs:    Recent Labs   Lab 06/29/23  0400 06/30/23  0634 07/01/23  0812   WBC 9.07 5.88 5.54   RBC 3.74* 3.85* 3.41*   HGB 9.1* 9.5* 8.2*   HCT 29.6* 31.6* 27.2*   MCV 79.1* 82.1 79.8*   MCH 24.3* 24.7* 24.0*   MCHC 30.7* 30.1* 30.1*   RDW 17.6* 18.6* 17.4*   * 451* 520*   MPV 9.2 10.6* 9.3       Recent Labs   Lab 06/29/23  0400 06/30/23  0634 07/01/23  0812   * 128* 128*   K 4.5 5.1 4.3   CO2 26 24 25   BUN 11.4 11.9 20.1   CREATININE 0.89 1.57* 3.58*   CALCIUM 8.6 8.4 7.6*   ALBUMIN 2.4* 2.2* 2.0*   ALKPHOS 77 68 65   ALT 16 13 9   AST 18 23 15   BILITOT 0.5 0.4 0.3          Microbiology Results (last 7 days)       Procedure Component Value Units Date/Time    Wound Culture [[id number]]  (Abnormal) Collected: 06/29/23 1058    Order Status: Completed Specimen: Wound from Knee, Right Updated: 07/01/23 0940     Wound Culture Few Streptococcus agalactiae (Group B)    Anaerobic Culture [[id number]] Collected: 06/29/23 1058    Order Status: Completed Specimen: Wound from Knee, Right Updated: 07/01/23 0833     Anaerobe Culture No Anaerobes Isolated    Blood culture #1 **CANNOT BE ORDERED STAT** [[id number]]  (Normal) Collected: 06/28/23 1701    Order Status: Completed Specimen: Blood Updated: 06/30/23 1900     CULTURE, BLOOD ([facility]) No Growth At 48 Hours    Blood culture #2 **CANNOT BE ORDERED STAT** [[id number]]  (Normal) Collected: 06/28/23 1701    Order Status: Completed Specimen: Blood Updated: 06/30/23 1800     CULTURE, BLOOD ([facility]) No Growth At 48 Hours    Wound Culture [[id number]]  (Abnormal) Collected: 06/28/23 1643    Order Status: Completed Specimen: Abscess from Knee, Right Updated: 06/30/23 1029     Wound Culture Moderate Streptococcus agalactiae (Group B)    Gram Stain [[id number]] Collected: 06/29/23 1058     Order Status: Completed Specimen: Wound from Knee, Right Updated: 06/30/23 0753     GRAM STAIN Rare WBC observed      No bacteria seen    AFB Smear [[id number]] Collected: 06/29/23 1058    Order Status: Completed Specimen: Wound from Knee, Right Updated: 06/30/23 0730     AFB Smear No AFB seen (Direct smear only)    Mycobacteria and Nocardia Culture [[id number]] Collected: 06/29/23 1058    Order Status: Sent Specimen: Wound from Knee, Right Updated: 06/29/23 1117    Fungal Culture [[id number]] Collected: 06/29/23 1058    Order Status: Sent Specimen: Wound from Knee, Right Updated: 06/29/23 1117             See below for Radiology    Scheduled Med:   amitriptyline  50 mg Oral QHS    amLODIPine  10 mg Oral Daily    atorvastatin  20 mg Oral Nightly    busPIRone  5 mg Oral BID    carvediloL  12.5 mg Oral BID    cefTRIAXone (ROCEPHIN) IVPB  2 g Intravenous Q24H    doxycycline  100 mg Oral Q12H    enoxaparin  40 mg Subcutaneous Daily    fenofibrate  145 mg Oral Daily    FLUoxetine  20 mg Oral Daily    gabapentin  300 mg Oral BID    meloxicam  15 mg Oral Daily    methocarbamoL  500 mg Oral TID    miconazole NITRATE 2 %   Topical (Top) BID    oxybutynin  5 mg Oral BID    pantoprazole  40 mg Intravenous Daily    sodium citrate-citric acid 500-334 mg/5 ml  30 mL Oral Once    traZODone  50 mg Oral Nightly        Continuous Infusions:   sodium chloride 0.9% 125 mL/hr at 07/01/23 0857    electrolyte-A          PRN Meds:  acetaminophen, cyclobenzaprine, LORazepam, melatonin, melatonin, morphine, ondansetron, oxyCODONE, oxyCODONE, sodium chloride 0.9%, zolpidem       Assessment/Plan:  Septic bursitis of the right knee status post incision and drainage   Right prepatellar bursa status post I&D-Group B Streptococcus   ANTON likely prerenal plus vancomycin induced  Hyponatremia possibly hypovolemic  Urine leakage around suprapubic catheter  T1-T6 cord injury with paraplegia  Neurogenic bladder with suprapubic catheter   PAF on  Xarelto  Type 1 diabetes mellitus  Essential hypertension  Documented hepatitis-C infection   Decubitus ulcerations  Chronic ankle wound/osteomyelitis on suppressive doxy    Labs noted, worsened  Cr 3.58, Na 128, cl 93, reported decreased urine out put  Cont iv fluid resuscitation, hydration increase the rate   Obtain urine studies  CT abd pelvis reported No overt acute process of the abdomen or pelvis with other secondary chronic secondary findings  Pt with no uremic symptoms, bicarb, K in good range   Will consider nephro evaluation if urine out put continues to be low  Suspecting pre renal injury with vancomycin induced   Needs strict out put monitoring   Avoid nephrotoxic medications, discontinued lisinopril, metformin    Noted urology inputs, appreciate assistance  Orthopedics ,infectious disease following, inputs noted  Wound vac per ortho   Wound care     Intraoperative wound cultures from right knee growing group B strep infectious Disease   contantibiotics ceftriaxone  doxycycline     H&H 8.2 likely dilutional       Monitor sugars and blood pressure  Cover with insulin sliding scale as needed for high sugars  Patient is on Xarelto at home for paroxysmal AFib need to resume   PT/OT   Multimodal pain control  Case discussed with patient's nurse, case management  Labs in the a.m.    VTE prophylaxis:  Lovenox    Patient condition:  Fair    Anticipated discharge and Disposition:   To be decided likely needs placement for long-term IV antibiotic therapy      _____________________________________________________________________    Nutrition Status:    Radiology:  I have personally reviewed the following imaging and agree with the radiologist.     CT Abdomen Pelvis  Without Contrast  Narrative: EXAMINATION:  CT ABDOMEN PELVIS WITHOUT CONTRAST    CLINICAL HISTORY:  ANTON/decreased UOP, SPT tube;    TECHNIQUE:  Multiple cross-sectional images were obtained from the lung bases the pubic symphysis without the use of  contrast.  Coronal and sagittal reformatted images were obtained.  An automated dose exposure technique was utilized this limits radiation does the patient.    COMPARISON:  05/30/2021    FINDINGS:  Coarse interstitial markings lungs with bibasilar atelectatic changes as well as likely scarring compressive atelectatic changes.  Heart size is within normal limits with partially visualized pacer leads.    Evaluation of hollow and solid viscera is limited secondary to technique.    The liver is enlarged and hypodense consistent hepatic steatosis.  Biliary sludge is identified.  The spleen, adrenals, and pancreas are normal.  The kidneys are symmetric in size with Bosniak type 1 cyst on the left.  No evidence for hydronephrosis or calculus.    Debris-filled gastric body.  Small bowel is of normal caliber.  Colon is of normal caliber with scattered colonic diverticula.  Moderate to large stool burden throughout the colon questionably normal appendix.    The prostate is grossly normal.  The bladder is under distended with a properly position suprapubic catheter.  No free fluid in the abdomen pelvis.  The course and caliber of the abdominal is normal with scattered calcified atheromatous disease.  An IVC filter is identified as well as a circumflex left renal vein.  No free fluid in the abdomen pelvis.  No evidence for adenopathy.    No suspicious osseous lesions.  Likely early stage decubitus ulcers without evidence for osteomyelitis.  Postsurgical changes are partially visualized fusion of the thoracic spine.  Soft tissues are otherwise unremarkable.  Impression: No overt acute process of the abdomen or pelvis with other secondary chronic secondary findings.    Electronically signed by: Wyatt Joradn MD  Date:    06/30/2023  Time:    20:34      Yissel Proctor MD   07/01/2023

## 2023-07-02 LAB
ALBUMIN SERPL-MCNC: 2.1 G/DL (ref 3.5–5)
ALBUMIN/GLOB SERPL: 0.5 RATIO (ref 1.1–2)
ALP SERPL-CCNC: 59 UNIT/L (ref 40–150)
ALT SERPL-CCNC: 7 UNIT/L (ref 0–55)
AST SERPL-CCNC: 15 UNIT/L (ref 5–34)
BACTERIA SPEC ANAEROBE CULT: NORMAL
BACTERIA SPEC CULT: ABNORMAL
BASOPHILS # BLD AUTO: 0.02 X10(3)/MCL
BASOPHILS NFR BLD AUTO: 0.3 %
BILIRUBIN DIRECT+TOT PNL SERPL-MCNC: 0.3 MG/DL
BUN SERPL-MCNC: 28.9 MG/DL (ref 8.4–25.7)
CALCIUM SERPL-MCNC: 8 MG/DL (ref 8.4–10.2)
CHLORIDE SERPL-SCNC: 94 MMOL/L (ref 98–107)
CO2 SERPL-SCNC: 19 MMOL/L (ref 22–29)
CREAT SERPL-MCNC: 5.5 MG/DL (ref 0.73–1.18)
EOSINOPHIL # BLD AUTO: 0.04 X10(3)/MCL (ref 0–0.9)
EOSINOPHIL NFR BLD AUTO: 0.7 %
ERYTHROCYTE [DISTWIDTH] IN BLOOD BY AUTOMATED COUNT: 17.5 % (ref 11.5–17)
GFR SERPLBLD CREATININE-BSD FMLA CKD-EPI: 11 MLS/MIN/1.73/M2
GLOBULIN SER-MCNC: 3.9 GM/DL (ref 2.4–3.5)
GLUCOSE SERPL-MCNC: 66 MG/DL (ref 74–100)
HCT VFR BLD AUTO: 27.2 % (ref 42–52)
HGB BLD-MCNC: 8.4 G/DL (ref 14–18)
IMM GRANULOCYTES # BLD AUTO: 0.05 X10(3)/MCL (ref 0–0.04)
IMM GRANULOCYTES NFR BLD AUTO: 0.8 %
LYMPHOCYTES # BLD AUTO: 1.07 X10(3)/MCL (ref 0.6–4.6)
LYMPHOCYTES NFR BLD AUTO: 17.8 %
MCH RBC QN AUTO: 24.6 PG (ref 27–31)
MCHC RBC AUTO-ENTMCNC: 30.9 G/DL (ref 33–36)
MCV RBC AUTO: 79.5 FL (ref 80–94)
MONOCYTES # BLD AUTO: 0.46 X10(3)/MCL (ref 0.1–1.3)
MONOCYTES NFR BLD AUTO: 7.7 %
NEUTROPHILS # BLD AUTO: 4.37 X10(3)/MCL (ref 2.1–9.2)
NEUTROPHILS NFR BLD AUTO: 72.7 %
NRBC BLD AUTO-RTO: 0 %
PLATELET # BLD AUTO: 562 X10(3)/MCL (ref 130–400)
PMV BLD AUTO: 9.4 FL (ref 7.4–10.4)
POTASSIUM SERPL-SCNC: 5.2 MMOL/L (ref 3.5–5.1)
PROT SERPL-MCNC: 6 GM/DL (ref 6.4–8.3)
RBC # BLD AUTO: 3.42 X10(6)/MCL (ref 4.7–6.1)
SODIUM SERPL-SCNC: 125 MMOL/L (ref 136–145)
WBC # SPEC AUTO: 6.01 X10(3)/MCL (ref 4.5–11.5)

## 2023-07-02 PROCEDURE — 25000003 PHARM REV CODE 250: Performed by: INTERNAL MEDICINE

## 2023-07-02 PROCEDURE — 11000001 HC ACUTE MED/SURG PRIVATE ROOM

## 2023-07-02 PROCEDURE — C1751 CATH, INF, PER/CENT/MIDLINE: HCPCS

## 2023-07-02 PROCEDURE — 63600175 PHARM REV CODE 636 W HCPCS: Performed by: ANESTHESIOLOGY

## 2023-07-02 PROCEDURE — 25000003 PHARM REV CODE 250: Performed by: PHYSICIAN ASSISTANT

## 2023-07-02 PROCEDURE — 80053 COMPREHEN METABOLIC PANEL: CPT | Performed by: PHYSICIAN ASSISTANT

## 2023-07-02 PROCEDURE — 76937 US GUIDE VASCULAR ACCESS: CPT

## 2023-07-02 PROCEDURE — 36410 VNPNXR 3YR/> PHY/QHP DX/THER: CPT

## 2023-07-02 PROCEDURE — 25000003 PHARM REV CODE 250: Performed by: NURSE PRACTITIONER

## 2023-07-02 PROCEDURE — C9113 INJ PANTOPRAZOLE SODIUM, VIA: HCPCS | Performed by: ANESTHESIOLOGY

## 2023-07-02 PROCEDURE — 63600175 PHARM REV CODE 636 W HCPCS: Performed by: INTERNAL MEDICINE

## 2023-07-02 PROCEDURE — 85025 COMPLETE CBC W/AUTO DIFF WBC: CPT | Performed by: PHYSICIAN ASSISTANT

## 2023-07-02 PROCEDURE — 63600175 PHARM REV CODE 636 W HCPCS: Performed by: PHYSICIAN ASSISTANT

## 2023-07-02 PROCEDURE — 21400001 HC TELEMETRY ROOM

## 2023-07-02 RX ORDER — INDOMETHACIN 25 MG/1
50 CAPSULE ORAL ONCE
Status: COMPLETED | OUTPATIENT
Start: 2023-07-02 | End: 2023-07-02

## 2023-07-02 RX ORDER — SODIUM BICARBONATE 650 MG/1
650 TABLET ORAL 2 TIMES DAILY
Status: DISCONTINUED | OUTPATIENT
Start: 2023-07-02 | End: 2023-07-12

## 2023-07-02 RX ADMIN — METHOCARBAMOL 500 MG: 500 TABLET ORAL at 06:07

## 2023-07-02 RX ADMIN — SODIUM BICARBONATE 50 MEQ: 84 INJECTION, SOLUTION INTRAVENOUS at 12:07

## 2023-07-02 RX ADMIN — METHOCARBAMOL 500 MG: 500 TABLET ORAL at 09:07

## 2023-07-02 RX ADMIN — OXYBUTYNIN CHLORIDE 5 MG: 5 TABLET ORAL at 09:07

## 2023-07-02 RX ADMIN — FLUOXETINE 20 MG: 20 CAPSULE ORAL at 09:07

## 2023-07-02 RX ADMIN — BUSPIRONE HYDROCHLORIDE 5 MG: 5 TABLET ORAL at 10:07

## 2023-07-02 RX ADMIN — CARVEDILOL 12.5 MG: 12.5 TABLET, FILM COATED ORAL at 09:07

## 2023-07-02 RX ADMIN — CARVEDILOL 12.5 MG: 12.5 TABLET, FILM COATED ORAL at 10:07

## 2023-07-02 RX ADMIN — BUSPIRONE HYDROCHLORIDE 5 MG: 5 TABLET ORAL at 09:07

## 2023-07-02 RX ADMIN — SODIUM BICARBONATE 650 MG TABLET 650 MG: at 10:07

## 2023-07-02 RX ADMIN — ZOLPIDEM TARTRATE 5 MG: 5 TABLET ORAL at 12:07

## 2023-07-02 RX ADMIN — AMITRIPTYLINE HYDROCHLORIDE 50 MG: 50 TABLET, FILM COATED ORAL at 10:07

## 2023-07-02 RX ADMIN — AMLODIPINE BESYLATE 10 MG: 5 TABLET ORAL at 09:07

## 2023-07-02 RX ADMIN — GABAPENTIN 300 MG: 300 CAPSULE ORAL at 10:07

## 2023-07-02 RX ADMIN — MICONAZOLE NITRATE 2 % TOPICAL POWDER: at 10:07

## 2023-07-02 RX ADMIN — DOXYCYCLINE HYCLATE 100 MG: 100 TABLET, COATED ORAL at 10:07

## 2023-07-02 RX ADMIN — ENOXAPARIN SODIUM 40 MG: 40 INJECTION SUBCUTANEOUS at 06:07

## 2023-07-02 RX ADMIN — PANTOPRAZOLE SODIUM 40 MG: 40 INJECTION, POWDER, LYOPHILIZED, FOR SOLUTION INTRAVENOUS at 09:07

## 2023-07-02 RX ADMIN — CYCLOBENZAPRINE 10 MG: 10 TABLET, FILM COATED ORAL at 12:07

## 2023-07-02 RX ADMIN — CEFTRIAXONE SODIUM 2 G: 2 INJECTION, POWDER, FOR SOLUTION INTRAMUSCULAR; INTRAVENOUS at 11:07

## 2023-07-02 RX ADMIN — OXYCODONE HYDROCHLORIDE 10 MG: 10 TABLET ORAL at 09:07

## 2023-07-02 RX ADMIN — OXYBUTYNIN CHLORIDE 5 MG: 5 TABLET ORAL at 10:07

## 2023-07-02 RX ADMIN — SODIUM BICARBONATE: 84 INJECTION, SOLUTION INTRAVENOUS at 10:07

## 2023-07-02 RX ADMIN — TRAZODONE HYDROCHLORIDE 50 MG: 50 TABLET ORAL at 10:07

## 2023-07-02 RX ADMIN — METHOCARBAMOL 500 MG: 500 TABLET ORAL at 10:07

## 2023-07-02 RX ADMIN — MICONAZOLE NITRATE 2 % TOPICAL POWDER: at 09:07

## 2023-07-02 RX ADMIN — SODIUM BICARBONATE 650 MG TABLET 650 MG: at 12:07

## 2023-07-02 RX ADMIN — CYCLOBENZAPRINE 10 MG: 10 TABLET, FILM COATED ORAL at 10:07

## 2023-07-02 RX ADMIN — ATORVASTATIN CALCIUM 20 MG: 10 TABLET, FILM COATED ORAL at 10:07

## 2023-07-02 RX ADMIN — DOXYCYCLINE HYCLATE 100 MG: 100 TABLET, COATED ORAL at 09:07

## 2023-07-02 RX ADMIN — GABAPENTIN 300 MG: 300 CAPSULE ORAL at 09:07

## 2023-07-02 NOTE — CONSULTS
Nephrology cross cover for Dr. Hall    Inpatient consult to Nephrology  Consult performed by: Luis Hill MD  Consult ordered by: Yissel Proctor MD      Chief Complaint   Patient presents with    Knee Injury     PT reports hitting R knee this weekend and reports swelling and pain to knee. Has hardware in knee from previous surgery. Swelling noted to R knee      HPI:  58-year-old male with a history of 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, neurogenic bladder with suprapubic catheter, HCV presented to the ER with right septic knee, Ortho feels he needs amputation for chronically infected hardware but pt not yet agreeable. Had Right knee I&D, WoundVac placed and on Rocephin.  On 06/30/2023 creatinine bumped to 1.5, vanc level was 49 so vancomycin, meloxicam, and lisinopril were all discontinued.  Noted SP catheter leaking around the insertion site and had decreased recorded urine output.  He gets his SP tube exchange every 2 weeks (exchanged on 06/21 with home health).  GI was consulted and SP tube was exchanged without issue.  However urine output did not increase and bladder scan was minimal.  He was started on IV fluids but still with poor urine output. Creatinine has risen to 5.5 today and Nephrology was consulted     Review of Systems   All other systems negative except for HPI      Past Medical History:   Diagnosis Date    Arthritis     Chronic ulcer of ankle 05/26/2022    Frequent UTI 07/02/2019    Generalized anxiety disorder 05/26/2022    Neurogenic bladder 05/26/2022    Osteomyelitis 05/26/2022    Presence of suprapubic catheter 05/26/2022    Pure hypercholesterolemia 05/26/2022    Retention of urine, unspecified 08/09/2019    Spinal cord injury at T1-T6 level 04/20/2018        Past Surgical History:   Procedure Laterality Date    INCISION AND DRAINAGE, LOWER EXTREMITY Right 6/29/2023    Procedure: INCISION AND DRAINAGE, LOWER EXTREMITY;  Surgeon: Prabhu Shen, DO;   Location: [facility] OR;  Service: Orthopedics;  Laterality: Right;  supine bone foam wash stuff cultures    INSERTION OF INTRAMEDULLARY MARISA Right 8/10/2022    Procedure: INSERTION, INTRAMEDULLARY MARISA RIGHT TIBIA;  Surgeon: Jorge Orellana MD;  Location: [facility];  Service: Orthopedics;  Laterality: Right;        Family History   Problem Relation Age of Onset    No Known Problems Mother     No Known Problems Father         Social History     Socioeconomic History    Marital status:    Tobacco Use    Smoking status: Every Day     Types: Cigars, Cigarettes    Smokeless tobacco: Never   Substance and Sexual Activity    Alcohol use: Yes     Alcohol/week: 2.0 standard drinks     Types: 2 Cans of beer per week    Drug use: Not Currently    Sexual activity: Never        Review of patient's allergies indicates:   Allergen Reactions    Baclofen Itching and Anxiety     Current Outpatient Medications   Medication Instructions    amitriptyline (ELAVIL) 50 mg, Oral, Nightly    amLODIPine (NORVASC) 10 MG tablet 1 tablet    atorvastatin (LIPITOR) 20 mg, Oral, Nightly    busPIRone (BUSPAR) 5 MG Tab 1 tablet    carvediloL (COREG) 12.5 mg, Oral    cyclobenzaprine (FLEXERIL) 10 mg, Oral, 2 times daily PRN    doxycycline (VIBRAMYCIN) 100 mg, Oral, Daily    fenofibrate 160 mg, Oral    FLUoxetine 20 mg, Oral    gabapentin (NEURONTIN) 300 MG capsule 1 capsule    lisinopriL 10 mg, Oral, Daily    LORazepam (ATIVAN) 1 mg, Oral, 2 times daily    melatonin (MELATIN) 3 mg tablet TAKE TWO TABLETS BY MOUTH EVERY NIGHT AT BEDTIME AS NEEDED FOR INSOMNIA.    meloxicam (MOBIC) 15 mg, Oral, Daily    metFORMIN (GLUCOPHAGE) 500 MG tablet SMARTSI Tablet(s) By Mouth Morning-Evening    oxybutynin (DITROPAN) 5 mg, Oral, 2 times daily    oxyCODONE-acetaminophen (PERCOCET)  mg per tablet 1 tablet, Oral, Every 6 hours PRN    pantoprazole (PROTONIX) 40 MG tablet 1 tablet    temazepam (RESTORIL) 30 mg, Oral, Nightly    traZODone (DESYREL) 50 mg, Oral,  Nightly    XARELTO 20 mg Tab SMARTSI Tablet(s) By Mouth Every Evening     Objective   VITAL SIGNS: 24 HR MIN & MAX LAST    Temp  Min: 97.7 °F (36.5 °C)  Max: 98.1 °F (36.7 °C)  97.7 °F (36.5 °C)        BP  Min: 127/73  Max: 149/81  (!) 148/71     Pulse  Min: 82  Max: 91  86     Resp  Min: 16  Max: 18  18    SpO2  Min: 86 %  Max: 99 %  (!) 86 %      Wt Readings from Last 3 Encounters:   23 86.2 kg (190 lb)   22 86.2 kg (190 lb)   22 86.2 kg (190 lb 0.6 oz)       Intake/Output Summary (Last 24 hours) at 2023 1852  Last data filed at 2023 1442  Gross per 24 hour   Intake 840 ml   Output 150 ml   Net 690 ml     General:  Alert and oriented  Psychiatric:  Cooperative, Appropriate mood & affect.    Eye:  Within normal limits, Normal conjunctiva.    HENT:  Normocephalic, Oral mucosa is moist.    Respiratory: Bilaterally CTA, un-labored.    Cardiovascular:  Normal rate, Regular rhythm, No murmur, No edema.    Gastrointestinal:  Soft, Normal bowel sounds.    Musculoskeletal:  Bilateral lower extremity paraplegia, right knee wound VAC left ankle wrapped  Integumentary:  Warm, No rash.    Recent Labs     23  0634 23  0812 23  0831   * 128* 125*   K 5.1 4.3 5.2*   CHLORIDE 98 93* 94*   CO2 24 25 19*   BUN 11.9 20.1 28.9*   CREATININE 1.57* 3.58* 5.50*   GLUCOSE 113* 94 66*   CALCIUM 8.4 7.6* 8.0*   ALBUMIN 2.2* 2.0* 2.1*      Recent Labs     23  0634 23  0812 23  0831   WBC 5.88 5.54 6.01   HGB 9.5* 8.2* 8.4*   * 520* 562*        ASSESSMENT PLAN    Right septic knee    ATN.  Multifactorial (Vanc, ACEi, NSAID, vol depletion)  Hyponatremia  Hyperkalemia  Metabolic acidosis  Neurogenic bladder, SP tube exchanged on   Anemia  Septic right knee, status post incision, drainage, wound VAC  HTN  Diabetes   AFib    Nurse reports patient lost IV access.  Okay to place midline    No need for dialysis at this time.  Patient looks dry.  Give bicarb drip for 2  liters.  Check renal ultrasound.  May need dialysis if patient does not improve soon.        Luis Hill M.D.  Nephrology

## 2023-07-02 NOTE — PROGRESS NOTES
[facility] Hospital Medicine Progress Note        Chief Complaint: Inpatient Follow-up for right knee septic arthritis    HPI:    58-year-old male with a history of 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, neurogenic bladder with suprapubic catheter, DM2, HTN, and additional past medical history as below who presented to the ER complaining that he struck his knee this weekend and has since had pain and swelling to this area.  He does report a prior knee surgery in the same right knee, remotely.     Patient was afebrile hemodynamically stable on arrival laboratory work showed anemia with microcytosis, and significantly elevated inflammatory markers.  CT of the right knee noted a 5 cm area subcutaneous fluid collection in the prepatellar region.  Purulent fluid was able to be aspirated in the ER per documentation.  Patient has been started on broad-spectrum antibiotics admitted to the hospitalist service with consultation to orthopedic surgery.    On 06/29/2023, Patient underwent right prepatellar bursa abscess incision and drainage, incision and drainage of right knee septic arthritis wound vac was placed due to concerns for chronic draining tract , intraoperative cultures were sent.    Interval Hx:   Reported urine out put low despite fluid resuscitation, seen and examined, complaining of pain at operated site, otherwise no complaints. Denied nausea, headache, sob.      Case was discussed with patient's nurse .    Objective/physical exam:  General: In no acute distress, afebrile  Chest: Clear to auscultation bilaterally  Heart:  +S1, S2, no appreciable murmur  Abdomen: Soft, nontender, BS +, SP cath   MSK:  Right knee dressing with a wound VAC noted sensations intact distally  Neurologic: Alert and oriented  VITAL SIGNS: 24 HRS MIN & MAX LAST   Temp  Min: 97.5 °F (36.4 °C)  Max: 98.1 °F (36.7 °C) 98.1 °F (36.7 °C)   BP  Min: 127/73  Max: 149/81 (!) 146/84    Pulse  Min: 80  Max: 91  91   Resp  Min: 16  Max: 18 18   SpO2  Min: 89 %  Max: 93 % (!) 93 %     I have reviewed the following labs:    Recent Labs   Lab 06/29/23  0400 06/30/23  0634 07/01/23  0812   WBC 9.07 5.88 5.54   RBC 3.74* 3.85* 3.41*   HGB 9.1* 9.5* 8.2*   HCT 29.6* 31.6* 27.2*   MCV 79.1* 82.1 79.8*   MCH 24.3* 24.7* 24.0*   MCHC 30.7* 30.1* 30.1*   RDW 17.6* 18.6* 17.4*   * 451* 520*   MPV 9.2 10.6* 9.3       Recent Labs   Lab 06/29/23 0400 06/30/23  0634 07/01/23  0812   * 128* 128*   K 4.5 5.1 4.3   CO2 26 24 25   BUN 11.4 11.9 20.1   CREATININE 0.89 1.57* 3.58*   CALCIUM 8.6 8.4 7.6*   ALBUMIN 2.4* 2.2* 2.0*   ALKPHOS 77 68 65   ALT 16 13 9   AST 18 23 15   BILITOT 0.5 0.4 0.3          Microbiology Results (last 7 days)       Procedure Component Value Units Date/Time    Anaerobic Culture [[id number]] Collected: 06/29/23 1058    Order Status: Completed Specimen: Wound from Knee, Right Updated: 07/02/23 0802     Anaerobe Culture No Anaerobes Isolated    Blood culture #1 **CANNOT BE ORDERED STAT** [[id number]]  (Normal) Collected: 06/28/23 1701    Order Status: Completed Specimen: Blood Updated: 07/01/23 1901     CULTURE, BLOOD ([facility]) No Growth At 72 Hours    Blood culture #2 **CANNOT BE ORDERED STAT** [[id number]]  (Normal) Collected: 06/28/23 1701    Order Status: Completed Specimen: Blood Updated: 07/01/23 1800     CULTURE, BLOOD ([facility]) No Growth At 72 Hours    Wound Culture [[id number]]  (Abnormal)  (Susceptibility) Collected: 06/28/23 1643    Order Status: Completed Specimen: Abscess from Knee, Right Updated: 07/01/23 1219     Wound Culture Moderate Streptococcus agalactiae (Group B)    Wound Culture [[id number]]  (Abnormal) Collected: 06/29/23 1058    Order Status: Completed Specimen: Wound from Knee, Right Updated: 07/01/23 0940     Wound Culture Few Streptococcus agalactiae (Group B)    Gram Stain [[id number]] Collected: 06/29/23 1058    Order Status: Completed Specimen: Wound from Knee,  Right Updated: 06/30/23 0753     GRAM STAIN Rare WBC observed      No bacteria seen    AFB Smear [[id number]] Collected: 06/29/23 1058    Order Status: Completed Specimen: Wound from Knee, Right Updated: 06/30/23 0730     AFB Smear No AFB seen (Direct smear only)    Mycobacteria and Nocardia Culture [[id number]] Collected: 06/29/23 1058    Order Status: Sent Specimen: Wound from Knee, Right Updated: 06/29/23 1117    Fungal Culture [[id number]] Collected: 06/29/23 1058    Order Status: Sent Specimen: Wound from Knee, Right Updated: 06/29/23 1117             See below for Radiology    Scheduled Med:   amitriptyline  50 mg Oral QHS    amLODIPine  10 mg Oral Daily    atorvastatin  20 mg Oral Nightly    busPIRone  5 mg Oral BID    carvediloL  12.5 mg Oral BID    cefTRIAXone (ROCEPHIN) IVPB  2 g Intravenous Q24H    doxycycline  100 mg Oral Q12H    enoxaparin  40 mg Subcutaneous Daily    fenofibrate  145 mg Oral Daily    FLUoxetine  20 mg Oral Daily    gabapentin  300 mg Oral BID    meloxicam  15 mg Oral Daily    methocarbamoL  500 mg Oral TID    miconazole NITRATE 2 %   Topical (Top) BID    oxybutynin  5 mg Oral BID    pantoprazole  40 mg Intravenous Daily    sodium citrate-citric acid 500-334 mg/5 ml  30 mL Oral Once    traZODone  50 mg Oral Nightly        Continuous Infusions:   sodium chloride 0.9% 125 mL/hr at 07/01/23 2356    electrolyte-A          PRN Meds:  acetaminophen, cyclobenzaprine, LORazepam, melatonin, melatonin, morphine, ondansetron, oxyCODONE, oxyCODONE, sodium chloride 0.9%, zolpidem       Assessment/Plan:  Septic bursitis of the right knee status post incision and drainage   Right prepatellar bursa status post I&D-Group B Streptococcus   ANTON likely prerenal plus vancomycin induced  Hyponatremia possibly hypovolemic  Urine leakage around suprapubic catheter  T1-T6 cord injury with paraplegia  Neurogenic bladder with suprapubic catheter   PAF on Xarelto  Type 1 diabetes mellitus  Essential  hypertension  Documented hepatitis-C infection   Decubitus ulcerations  Chronic ankle wound/osteomyelitis on suppressive doxy    Labs noted, worsening renal function creatinine 5.5 BUN 28.9, potassium 5.2, bicarb 19 with oliguria  Cont iv fluid resuscitation, we will add p.o. bicarb  Obtain Nephrology consultation   CT abd pelvis reported No overt acute process of the abdomen or pelvis with other secondary chronic secondary findings  Pt with no uremic symptoms   Needs strict out put monitoring   Avoid nephrotoxic medications, discontinued lisinopril, metformin    Noted urology inputs, appreciate assistance  Orthopedics ,infectious disease following, inputs noted  Wound vac per ortho   Wound care     Intraoperative wound cultures from right knee growing group B strep infectious Disease   contantibiotics ceftriaxone  doxycycline     H&H stable at 8.4        Monitor sugars and blood pressure  Cover with insulin sliding scale as needed for high sugars  Patient is on Xarelto at home for paroxysmal AFib need to resume   PT/OT   Multimodal pain control  Case discussed with patient's nurse  Repeat labs in the a.m.    VTE prophylaxis:  Lovenox    Patient condition:  Fair    Anticipated discharge and Disposition:   To be decided likely needs placement for long-term IV antibiotic therapy      _____________________________________________________________________    Nutrition Status:    Radiology:  I have personally reviewed the following imaging and agree with the radiologist.     CT Abdomen Pelvis  Without Contrast  Narrative: EXAMINATION:  CT ABDOMEN PELVIS WITHOUT CONTRAST    CLINICAL HISTORY:  ANTON/decreased UOP, SPT tube;    TECHNIQUE:  Multiple cross-sectional images were obtained from the lung bases the pubic symphysis without the use of contrast.  Coronal and sagittal reformatted images were obtained.  An automated dose exposure technique was utilized this limits radiation does the  patient.    COMPARISON:  05/30/2021    FINDINGS:  Coarse interstitial markings lungs with bibasilar atelectatic changes as well as likely scarring compressive atelectatic changes.  Heart size is within normal limits with partially visualized pacer leads.    Evaluation of hollow and solid viscera is limited secondary to technique.    The liver is enlarged and hypodense consistent hepatic steatosis.  Biliary sludge is identified.  The spleen, adrenals, and pancreas are normal.  The kidneys are symmetric in size with Bosniak type 1 cyst on the left.  No evidence for hydronephrosis or calculus.    Debris-filled gastric body.  Small bowel is of normal caliber.  Colon is of normal caliber with scattered colonic diverticula.  Moderate to large stool burden throughout the colon questionably normal appendix.    The prostate is grossly normal.  The bladder is under distended with a properly position suprapubic catheter.  No free fluid in the abdomen pelvis.  The course and caliber of the abdominal is normal with scattered calcified atheromatous disease.  An IVC filter is identified as well as a circumflex left renal vein.  No free fluid in the abdomen pelvis.  No evidence for adenopathy.    No suspicious osseous lesions.  Likely early stage decubitus ulcers without evidence for osteomyelitis.  Postsurgical changes are partially visualized fusion of the thoracic spine.  Soft tissues are otherwise unremarkable.  Impression: No overt acute process of the abdomen or pelvis with other secondary chronic secondary findings.    Electronically signed by: Wyatt Jordan MD  Date:    06/30/2023  Time:    20:34      Yissel Proctor MD   07/02/2023

## 2023-07-02 NOTE — H&P
[facility] - 9th Floor Med Surg    History & Physical      Patient Name: Bianca Khan  MRN: [id number]  Admission Date: 6/28/2023  Attending Physician: Phil Joans MD   Primary Care Provider: Areli Vargas PA-C         Patient information was obtained from ER records.     Subjective:     Principal Problem:Abscess of bursa of right knee    Chief Complaint:   Chief Complaint   Patient presents with    Knee Injury     PT reports hitting R knee this weekend and reports swelling and pain to knee. Has hardware in knee from previous surgery. Swelling noted to R knee         HPI: 57 y/o male well known to me with extensive medical history of spinal cord injury wheel jose luis bound tobacco abuse htn hld neurogenic bladder dm htn neuropathy presented to the ed with complaints of right knee pain and swelling and subsequently admitted to hospitalist with ortho consult for potential septic bursitis with right knee post knee aspiration subsequently I and d with wound vac placement by ortho remained on braod spectrum iv abx remained afebrile h and h drops, renal indices worse and electrolyte derangements prompting renal consult transferred care to our service per patient request    Past Medical History:   Diagnosis Date    Arthritis     Chronic ulcer of ankle 05/26/2022    Frequent UTI 07/02/2019    Generalized anxiety disorder 05/26/2022    Neurogenic bladder 05/26/2022    Osteomyelitis 05/26/2022    Presence of suprapubic catheter 05/26/2022    Pure hypercholesterolemia 05/26/2022    Retention of urine, unspecified 08/09/2019    Spinal cord injury at T1-T6 level 04/20/2018       Past Surgical History:   Procedure Laterality Date    INCISION AND DRAINAGE, LOWER EXTREMITY Right 6/29/2023    Procedure: INCISION AND DRAINAGE, LOWER EXTREMITY;  Surgeon: Prabhu Shen DO;  Location: [facility];  Service: Orthopedics;  Laterality: Right;  supine bone foam wash stuff cultures    INSERTION OF INTRAMEDULLARY MARISA Right  8/10/2022    Procedure: INSERTION, INTRAMEDULLARY MARISA RIGHT TIBIA;  Surgeon: Jorge Orellana MD;  [facility];  Service: Orthopedics;  Laterality: Right;       Review of patient's allergies indicates:   Allergen Reactions    Baclofen Itching and Anxiety       No current facility-administered medications on file prior to encounter.     Current Outpatient Medications on File Prior to Encounter   Medication Sig    amitriptyline (ELAVIL) 50 MG tablet Take 50 mg by mouth every evening.    amLODIPine (NORVASC) 10 MG tablet 1 tablet    atorvastatin (LIPITOR) 20 MG tablet Take 20 mg by mouth nightly.    busPIRone (BUSPAR) 5 MG Tab 1 tablet    carvediloL (COREG) 12.5 MG tablet Take 12.5 mg by mouth.    cyclobenzaprine (FLEXERIL) 10 MG tablet Take 10 mg by mouth 2 (two) times daily as needed.    doxycycline (VIBRAMYCIN) 100 MG Cap Take 100 mg by mouth once daily.    fenofibrate 160 MG Tab Take 160 mg by mouth.    FLUoxetine 20 MG capsule Take 20 mg by mouth.    gabapentin (NEURONTIN) 300 MG capsule 1 capsule    lisinopriL 10 MG tablet Take 10 mg by mouth Daily.    LORazepam (ATIVAN) 1 MG tablet Take 1 mg by mouth 2 (two) times daily.    melatonin (MELATIN) 3 mg tablet TAKE TWO TABLETS BY MOUTH EVERY NIGHT AT BEDTIME AS NEEDED FOR INSOMNIA.    meloxicam (MOBIC) 15 MG tablet Take 15 mg by mouth once daily.    metFORMIN (GLUCOPHAGE) 500 MG tablet SMARTSI Tablet(s) By Mouth Morning-Evening    oxybutynin (DITROPAN) 5 MG Tab Take 5 mg by mouth 2 (two) times daily.    oxyCODONE-acetaminophen (PERCOCET)  mg per tablet Take 1 tablet by mouth every 6 (six) hours as needed.    pantoprazole (PROTONIX) 40 MG tablet 1 tablet    temazepam (RESTORIL) 30 mg capsule Take 30 mg by mouth nightly.    traZODone (DESYREL) 50 MG tablet Take 50 mg by mouth nightly.    XARELTO 20 mg Tab SMARTSI Tablet(s) By Mouth Every Evening     Family History       Problem Relation (Age of Onset)    No Known Problems Mother, Father           Tobacco Use    Smoking status: Every Day     Types: Cigars, Cigarettes    Smokeless tobacco: Never   Substance and Sexual Activity    Alcohol use: Yes     Alcohol/week: 2.0 standard drinks     Types: 2 Cans of beer per week    Drug use: Not Currently    Sexual activity: Never     Review of Systems   Constitutional: Negative.    HENT: Negative.     Eyes: Negative.    Respiratory:  Positive for shortness of breath.    Cardiovascular: Negative.    Gastrointestinal: Negative.    Endocrine: Negative.    Genitourinary: Negative.    Musculoskeletal:  Positive for arthralgias, back pain, gait problem and joint swelling.   Skin:  Positive for wound.   Allergic/Immunologic: Negative.  Food allergies: manthena.   Neurological:  Positive for weakness.   Hematological: Negative.    Psychiatric/Behavioral:  Positive for confusion.    Objective:     Vital Signs (Most Recent):  Temp: 98.1 °F (36.7 °C) (07/02/23 1100)  Pulse: 83 (07/02/23 1100)  Resp: 18 (07/02/23 1100)  BP: 136/81 (07/02/23 1100)  SpO2: 99 % (07/02/23 1100) Vital Signs (24h Range):  Temp:  [97.5 °F (36.4 °C)-98.1 °F (36.7 °C)] 98.1 °F (36.7 °C)  Pulse:  [80-91] 83  Resp:  [16-18] 18  SpO2:  [89 %-99 %] 99 %  BP: (127-149)/(73-84) 136/81     Weight: 86.2 kg (190 lb)  Body mass index is 27.26 kg/m².    Physical Exam  Vitals reviewed.   Constitutional:       Appearance: Normal appearance.   HENT:      Head: Normocephalic and atraumatic.      Right Ear: Tympanic membrane and external ear normal.      Nose: Nose normal.      Mouth/Throat:      Mouth: Mucous membranes are moist.   Eyes:      Extraocular Movements: Extraocular movements intact.      Pupils: Pupils are equal, round, and reactive to light.   Cardiovascular:      Rate and Rhythm: Normal rate and regular rhythm.      Pulses: Normal pulses.      Heart sounds: Normal heart sounds.   Pulmonary:      Effort: Pulmonary effort is normal.      Breath sounds: Normal breath sounds.   Abdominal:      General:  Abdomen is flat. Bowel sounds are normal.      Palpations: Abdomen is soft.   Musculoskeletal:         General: Swelling and deformity present. Normal range of motion.      Cervical back: Normal range of motion and neck supple.      Right lower leg: Edema present.      Left lower leg: Edema present.   Skin:     General: Skin is warm and dry.   Neurological:      General: No focal deficit present.      Mental Status: He is alert and oriented to person, place, and time.      Motor: Weakness present.      Gait: Gait abnormal.      Deep Tendon Reflexes: Reflexes abnormal.   Psychiatric:         Mood and Affect: Mood normal.         Behavior: Behavior normal.         CRANIAL NERVES     CN III, IV, VI   Pupils are equal, round, and reactive to light.    Significant Labs: All pertinent labs within the past 24 hours have been reviewed.  Lab Results   Component Value Date    WBC 6.01 07/02/2023    HGB 8.4 (L) 07/02/2023    HCT 27.2 (L) 07/02/2023    MCV 79.5 (L) 07/02/2023     (H) 07/02/2023       Recent Labs   Lab 07/02/23  0831   *   K 5.2*   CO2 19*   BUN 28.9*   CREATININE 5.50*   GLUCOSE 66*   CALCIUM 8.0*         Significant Imaging: I have reviewed all pertinent imaging results/findings within the past 24 hours.    Assessment/Plan:     Active Diagnoses:    Diagnosis Date Noted POA    PRINCIPAL PROBLEM:  Abscess of bursa of right knee [M71.061] 06/28/2023 Yes      Problems Resolved During this Admission:     VTE Risk Mitigation (From admission, onward)           Ordered     enoxaparin injection 40 mg  Daily         06/29/23 1116     IP VTE HIGH RISK PATIENT  Once         06/28/23 2059     Place sequential compression device  Until discontinued         06/28/23 2059                  Sepsis  Right knee burisitis  S/p aspirationa nd subsequent I and d with wound vac placement  Anemia, unspecified  [place] acidosis  Hyponatremia  Hyperkalemia  Htn  Neurogenic bladder  Afib  Metabolic derangements  Metabolic  encephalopathy  Decubitus state  H/o spinal cord injury wheel joes luis bound tobacco abuse htn hld neurogenic bladder dm htn neuropathy    Plan :  Ivf  Iv abx  Replace lytes as needed  Ortho reccs  Renal consu;t monitor ha ndh  and renal indices  Avoid nephrotoxins  Potential placement  Cm for ltac eval  Gi and dvt ppx  Code statsu full      Phil Jonas MD  Department of Hospital Medicine   [facility] - 9th Floor Med Surg

## 2023-07-03 LAB
ALBUMIN SERPL-MCNC: 2.2 G/DL (ref 3.5–5)
ALBUMIN/GLOB SERPL: 0.6 RATIO (ref 1.1–2)
ALLENS TEST BLOOD GAS (OHS): YES
ALP SERPL-CCNC: 56 UNIT/L (ref 40–150)
ALT SERPL-CCNC: 8 UNIT/L (ref 0–55)
AST SERPL-CCNC: 16 UNIT/L (ref 5–34)
BACTERIA BLD CULT: NORMAL
BACTERIA BLD CULT: NORMAL
BASE EXCESS BLD CALC-SCNC: -2.6 MMOL/L (ref -2–2)
BASOPHILS # BLD AUTO: 0 X10(3)/MCL
BASOPHILS NFR BLD AUTO: 0 %
BILIRUBIN DIRECT+TOT PNL SERPL-MCNC: 0.3 MG/DL
BLOOD GAS SAMPLE TYPE (OHS): ABNORMAL
BUN SERPL-MCNC: 35.9 MG/DL (ref 8.4–25.7)
CA-I BLD-SCNC: 1.07 MMOL/L (ref 1.12–1.23)
CALCIUM SERPL-MCNC: 8.1 MG/DL (ref 8.4–10.2)
CHLORIDE SERPL-SCNC: 93 MMOL/L (ref 98–107)
CO2 BLDA-SCNC: 23.7 MMOL/L
CO2 SERPL-SCNC: 20 MMOL/L (ref 22–29)
COHGB MFR BLDA: 0.9 % (ref 0.5–1.5)
CREAT SERPL-MCNC: 6.69 MG/DL (ref 0.73–1.18)
DRAWN BY BLOOD GAS (OHS): ABNORMAL
EOSINOPHIL # BLD AUTO: 0.01 X10(3)/MCL (ref 0–0.9)
EOSINOPHIL NFR BLD AUTO: 0.2 %
ERYTHROCYTE [DISTWIDTH] IN BLOOD BY AUTOMATED COUNT: 17.2 % (ref 11.5–17)
GFR SERPLBLD CREATININE-BSD FMLA CKD-EPI: 9 MLS/MIN/1.73/M2
GLOBULIN SER-MCNC: 4 GM/DL (ref 2.4–3.5)
GLUCOSE SERPL-MCNC: 95 MG/DL (ref 74–100)
HCO3 BLDA-SCNC: 22.5 MMOL/L (ref 22–26)
HCT VFR BLD AUTO: 27.2 % (ref 42–52)
HGB BLD-MCNC: 8.6 G/DL (ref 14–18)
IMM GRANULOCYTES # BLD AUTO: 0.05 X10(3)/MCL (ref 0–0.04)
IMM GRANULOCYTES NFR BLD AUTO: 0.8 %
LPM (OHS): 15
LYMPHOCYTES # BLD AUTO: 1.03 X10(3)/MCL (ref 0.6–4.6)
LYMPHOCYTES NFR BLD AUTO: 16.7 %
MCH RBC QN AUTO: 24.3 PG (ref 27–31)
MCHC RBC AUTO-ENTMCNC: 31.6 G/DL (ref 33–36)
MCV RBC AUTO: 76.8 FL (ref 80–94)
METHGB MFR BLDA: 0.7 % (ref 0.4–1.5)
MONOCYTES # BLD AUTO: 0.37 X10(3)/MCL (ref 0.1–1.3)
MONOCYTES NFR BLD AUTO: 6 %
NEUTROPHILS # BLD AUTO: 4.71 X10(3)/MCL (ref 2.1–9.2)
NEUTROPHILS NFR BLD AUTO: 76.3 %
NRBC BLD AUTO-RTO: 0 %
O2 HB BLOOD GAS (OHS): 95.3 % (ref 94–97)
OXYGEN DEVICE BLOOD GAS (OHS): ABNORMAL
OXYHGB MFR BLDA: 8.8 G/DL (ref 12–16)
PCO2 BLDA: 39 MMHG (ref 35–45)
PH BLDA: 7.37 [PH] (ref 7.35–7.45)
PLATELET # BLD AUTO: 589 X10(3)/MCL (ref 130–400)
PMV BLD AUTO: 9.5 FL (ref 7.4–10.4)
PO2 BLDA: 81 MMHG (ref 80–100)
POCT GLUCOSE: 89 MG/DL (ref 70–110)
POTASSIUM BLOOD GAS (OHS): 5 MMOL/L (ref 3.5–5)
POTASSIUM SERPL-SCNC: 4.9 MMOL/L (ref 3.5–5.1)
PROT SERPL-MCNC: 6.2 GM/DL (ref 6.4–8.3)
RBC # BLD AUTO: 3.54 X10(6)/MCL (ref 4.7–6.1)
RHODAMINE-AURAMINE STN SPEC: NORMAL
SAMPLE SITE BLOOD GAS (OHS): ABNORMAL
SAO2 % BLDA: 95.5 %
SODIUM BLOOD GAS (OHS): 121 MMOL/L (ref 137–145)
SODIUM SERPL-SCNC: 125 MMOL/L (ref 136–145)
WBC # SPEC AUTO: 6.17 X10(3)/MCL (ref 4.5–11.5)

## 2023-07-03 PROCEDURE — 97530 THERAPEUTIC ACTIVITIES: CPT | Mod: CO

## 2023-07-03 PROCEDURE — 94640 AIRWAY INHALATION TREATMENT: CPT

## 2023-07-03 PROCEDURE — 25000003 PHARM REV CODE 250: Performed by: INTERNAL MEDICINE

## 2023-07-03 PROCEDURE — 21400001 HC TELEMETRY ROOM

## 2023-07-03 PROCEDURE — 25000242 PHARM REV CODE 250 ALT 637 W/ HCPCS: Performed by: INTERNAL MEDICINE

## 2023-07-03 PROCEDURE — 27000221 HC OXYGEN, UP TO 24 HOURS

## 2023-07-03 PROCEDURE — 94799 UNLISTED PULMONARY SVC/PX: CPT

## 2023-07-03 PROCEDURE — 25000003 PHARM REV CODE 250: Performed by: PHYSICIAN ASSISTANT

## 2023-07-03 PROCEDURE — 63600175 PHARM REV CODE 636 W HCPCS: Performed by: INTERNAL MEDICINE

## 2023-07-03 PROCEDURE — 86706 HEP B SURFACE ANTIBODY: CPT | Performed by: INTERNAL MEDICINE

## 2023-07-03 PROCEDURE — 63600175 PHARM REV CODE 636 W HCPCS: Performed by: NURSE PRACTITIONER

## 2023-07-03 PROCEDURE — 27000249 HC VAPOTHERM CIRCUIT

## 2023-07-03 PROCEDURE — C9113 INJ PANTOPRAZOLE SODIUM, VIA: HCPCS | Performed by: ANESTHESIOLOGY

## 2023-07-03 PROCEDURE — 80053 COMPREHEN METABOLIC PANEL: CPT | Performed by: INTERNAL MEDICINE

## 2023-07-03 PROCEDURE — 99900031 HC PATIENT EDUCATION (STAT)

## 2023-07-03 PROCEDURE — 27100171 HC OXYGEN HIGH FLOW UP TO 24 HOURS

## 2023-07-03 PROCEDURE — 97530 THERAPEUTIC ACTIVITIES: CPT | Mod: CQ

## 2023-07-03 PROCEDURE — 85025 COMPLETE CBC W/AUTO DIFF WBC: CPT | Performed by: INTERNAL MEDICINE

## 2023-07-03 PROCEDURE — 27100092 HC HIGH FLOW DELIVERY CANNULA

## 2023-07-03 PROCEDURE — 94761 N-INVAS EAR/PLS OXIMETRY MLT: CPT

## 2023-07-03 PROCEDURE — 97605 NEG PRS WND THER DME<=50SQCM: CPT

## 2023-07-03 PROCEDURE — 63600175 PHARM REV CODE 636 W HCPCS: Performed by: ANESTHESIOLOGY

## 2023-07-03 RX ORDER — LEVALBUTEROL 1.25 MG/.5ML
1.25 SOLUTION, CONCENTRATE RESPIRATORY (INHALATION) 3 TIMES DAILY PRN
Status: DISCONTINUED | OUTPATIENT
Start: 2023-07-03 | End: 2023-07-03

## 2023-07-03 RX ORDER — IPRATROPIUM BROMIDE AND ALBUTEROL SULFATE 2.5; .5 MG/3ML; MG/3ML
3 SOLUTION RESPIRATORY (INHALATION) EVERY 6 HOURS PRN
Status: DISCONTINUED | OUTPATIENT
Start: 2023-07-03 | End: 2023-07-04

## 2023-07-03 RX ORDER — IPRATROPIUM BROMIDE 0.5 MG/2.5ML
0.5 SOLUTION RESPIRATORY (INHALATION) EVERY 6 HOURS PRN
Status: DISCONTINUED | OUTPATIENT
Start: 2023-07-03 | End: 2023-07-03

## 2023-07-03 RX ORDER — IPRATROPIUM BROMIDE AND ALBUTEROL SULFATE 2.5; .5 MG/3ML; MG/3ML
3 SOLUTION RESPIRATORY (INHALATION) EVERY 6 HOURS PRN
Status: DISCONTINUED | OUTPATIENT
Start: 2023-07-03 | End: 2023-07-03 | Stop reason: SDUPTHER

## 2023-07-03 RX ORDER — FUROSEMIDE 10 MG/ML
40 INJECTION INTRAMUSCULAR; INTRAVENOUS ONCE
Status: COMPLETED | OUTPATIENT
Start: 2023-07-03 | End: 2023-07-03

## 2023-07-03 RX ORDER — GABAPENTIN 100 MG/1
100 CAPSULE ORAL 2 TIMES DAILY
Status: DISCONTINUED | OUTPATIENT
Start: 2023-07-03 | End: 2023-07-04

## 2023-07-03 RX ORDER — FUROSEMIDE 10 MG/ML
40 INJECTION INTRAMUSCULAR; INTRAVENOUS 2 TIMES DAILY
Status: DISCONTINUED | OUTPATIENT
Start: 2023-07-03 | End: 2023-07-14

## 2023-07-03 RX ORDER — ENOXAPARIN SODIUM 100 MG/ML
30 INJECTION SUBCUTANEOUS EVERY 24 HOURS
Status: DISCONTINUED | OUTPATIENT
Start: 2023-07-03 | End: 2023-07-18

## 2023-07-03 RX ORDER — POLYETHYLENE GLYCOL 3350 17 G/17G
17 POWDER, FOR SOLUTION ORAL DAILY
Status: DISCONTINUED | OUTPATIENT
Start: 2023-07-03 | End: 2023-07-04

## 2023-07-03 RX ADMIN — FENOFIBRATE 145 MG: 145 TABLET, FILM COATED ORAL at 09:07

## 2023-07-03 RX ADMIN — DOXYCYCLINE HYCLATE 100 MG: 100 TABLET, COATED ORAL at 09:07

## 2023-07-03 RX ADMIN — OXYCODONE HYDROCHLORIDE 5 MG: 5 TABLET ORAL at 06:07

## 2023-07-03 RX ADMIN — OXYBUTYNIN CHLORIDE 5 MG: 5 TABLET ORAL at 09:07

## 2023-07-03 RX ADMIN — SODIUM BICARBONATE: 84 INJECTION, SOLUTION INTRAVENOUS at 09:07

## 2023-07-03 RX ADMIN — CARVEDILOL 12.5 MG: 12.5 TABLET, FILM COATED ORAL at 09:07

## 2023-07-03 RX ADMIN — POLYETHYLENE GLYCOL 3350 17 G: 17 POWDER, FOR SOLUTION ORAL at 03:07

## 2023-07-03 RX ADMIN — METHOCARBAMOL 500 MG: 500 TABLET ORAL at 09:07

## 2023-07-03 RX ADMIN — AMLODIPINE BESYLATE 10 MG: 5 TABLET ORAL at 09:07

## 2023-07-03 RX ADMIN — METHOCARBAMOL 500 MG: 500 TABLET ORAL at 03:07

## 2023-07-03 RX ADMIN — FUROSEMIDE 40 MG: 10 INJECTION, SOLUTION INTRAMUSCULAR; INTRAVENOUS at 08:07

## 2023-07-03 RX ADMIN — ENOXAPARIN SODIUM 30 MG: 30 INJECTION SUBCUTANEOUS at 05:07

## 2023-07-03 RX ADMIN — GABAPENTIN 300 MG: 300 CAPSULE ORAL at 09:07

## 2023-07-03 RX ADMIN — MICONAZOLE NITRATE 2 % TOPICAL POWDER: at 09:07

## 2023-07-03 RX ADMIN — SODIUM BICARBONATE 650 MG TABLET 650 MG: at 09:07

## 2023-07-03 RX ADMIN — PANTOPRAZOLE SODIUM 40 MG: 40 INJECTION, POWDER, LYOPHILIZED, FOR SOLUTION INTRAVENOUS at 09:07

## 2023-07-03 RX ADMIN — BUSPIRONE HYDROCHLORIDE 5 MG: 5 TABLET ORAL at 09:07

## 2023-07-03 RX ADMIN — IPRATROPIUM BROMIDE AND ALBUTEROL SULFATE 3 ML: .5; 3 SOLUTION RESPIRATORY (INHALATION) at 03:07

## 2023-07-03 RX ADMIN — ACETAMINOPHEN 650 MG: 325 TABLET, FILM COATED ORAL at 05:07

## 2023-07-03 RX ADMIN — FLUOXETINE 20 MG: 20 CAPSULE ORAL at 09:07

## 2023-07-03 RX ADMIN — FUROSEMIDE 40 MG: 10 INJECTION, SOLUTION INTRAMUSCULAR; INTRAVENOUS at 03:07

## 2023-07-03 NOTE — PROCEDURES
"Bianca Khan is a 58 y.o. male patient.    Temp: 97.4 °F (36.3 °C) (07/02/23 2010)  Pulse: 87 (07/02/23 2010)  Resp: 18 (07/02/23 1100)  BP: (!) 155/82 (07/02/23 2010)  SpO2: (!) 87 % (07/02/23 2010)  Weight: 86.2 kg (190 lb) (06/28/23 2237)  Height: 5' 10" (177.8 cm) (06/28/23 2237)    PICC  Date/Time: 7/2/2023 9:54 PM  Consent Done: Yes  Time out: Immediately prior to procedure a time out was called to verify the correct patient, procedure, equipment, support staff and site/side marked as required  Indications: vascular access and med administration  Anesthesia: local infiltration  Local anesthetic: lidocaine 1% without epinephrine  Anesthetic Total (mL): 4  Preparation: skin prepped with ChloraPrep  Skin prep agent dried: skin prep agent completely dried prior to procedure  Sterile barriers: all five maximum sterile barriers used - cap, mask, sterile gown, sterile gloves, and large sterile sheet  Hand hygiene: hand hygiene performed prior to central venous catheter insertion  Location details: right basilic  Catheter type: single lumen  Catheter size: 4 Fr  Catheter Length: 17cm    Ultrasound guidance: yes  Vessel Caliber: large and patent, compressibility normal  Needle advanced into vessel with real time Ultrasound guidance.  Guidewire confirmed in vessel.  Sterile sheath used.  Number of attempts: 1  Post-procedure: blood return through all ports, chlorhexidine patch and sterile dressing applied          Name Ruth Combs  7/2/2023    "

## 2023-07-03 NOTE — PT/OT/SLP PROGRESS
Occupational Therapy   Treatment    Name: Bianca Khan  MRN: [id number]  Admitting Diagnosis:  Abscess of bursa of right knee  4 Days Post-Op    Recommendations:     Discharge Recommendations: LTACH (long-term acute care hospital)  Discharge Equipment Recommendations:   (pt reports necessary equipment)  Barriers to discharge:       Assessment:     Bianca Khan is a 58 y.o. male with a medical diagnosis of Abscess of bursa of right knee.  He presents with decreased ax tolerance. Performance deficits affecting function are weakness, decreased upper extremity function, impaired endurance, impaired balance, decreased lower extremity function, impaired self care skills, impaired functional mobility.     Rehab Prognosis:  Good; patient would benefit from acute skilled OT services to address these deficits and reach maximum level of function.       Plan:     Patient to be seen 3 x/week to address the above listed problems via self-care/home management, therapeutic activities, therapeutic exercises  Plan of Care Expires: 07/31/23  Plan of Care Reviewed with: patient, caregiver    Subjective     Pain/Comfort:  Complains of pain in cervical area     Objective:     Communicated with: nurse prior to session.  Patient found HOB elevated with pressure relief boots, peripheral IV, pulse ox (continuous), oxygen, wound vac upon OT entry to room.    General Precautions: Standard, fall    Orthopedic Precautions:N/A  Braces: N/A  Respiratory Status: oxi mask     Occupational Performance:     Bed Mobility:    Rolling side to side in bed with Max A x 4, scoot to head of bed with Total A     AMPAC 6 Click ADL: 17    Patient Education:  Patient provided with verbal and demonstration education regarding fall prevention.  Additional teaching is warranted.      Patient left HOB elevated with all lines intact and call button in reach    GOALS:   Multidisciplinary Problems       Occupational Therapy Goals          Problem: Occupational  Therapy    Goal Priority Disciplines Outcome Interventions   Occupational Therapy Goal     OT, PT/OT Ongoing, Progressing    Description: Goals to be met by: 7/30/23     Patient will increase functional independence with ADLs by performing:    LE Dressing with Modified Green.  Toileting from bedside commode with Modified Green for hygiene and clothing management.   Bathing from  shower chair/bench with Modified Green.  Toilet transfer to bedside commode with Modified Green.                         Time Tracking:     OT Date of Treatment: 07/03/23  OT Start Time: 1525  OT Stop Time: 1550  OT Total Time (min): 25 min    Billable Minutes:Therapeutic Activity 25    OT/ANNABEL: ANNABEL     Number of ANNABEL visits since last OT visit: 1    7/3/2023

## 2023-07-03 NOTE — PROGRESS NOTES
[facility] - 9th Floor [facility] Medicine  Progress Note    Patient Name: Bianca Khan  MRN: [id number]  Patient Class: IP- Inpatient   Admission Date: 6/28/2023  Length of Stay: 5 days  Attending Physician: Phil Jonas MD  Primary Care Provider: Areli Vargas PA-C        Subjective:     Principal Problem:Abscess of bursa of right knee    Interval History:   Today's info : seen and examined, no acute events overnight. Continues to improve   Renal indices worse  Bicarb better    Review of Systems   Constitutional:  Positive for fever.   HENT: Negative.     Eyes: Negative.    Respiratory:  Positive for shortness of breath.    Cardiovascular: Negative.    Gastrointestinal: Negative.    Endocrine: Negative.    Genitourinary: Negative.    Musculoskeletal:  Positive for arthralgias, gait problem and joint swelling.   Skin:  Positive for color change and wound.   Allergic/Immunologic: Negative.    Neurological:  Positive for weakness.   Psychiatric/Behavioral: Negative.     Objective:     Vital Signs (Most Recent):  Temp: 97.9 °F (36.6 °C) (07/03/23 1542)  Pulse: 88 (07/03/23 1542)  Resp: 16 (07/03/23 1500)  BP: (!) 166/94 (07/03/23 1542)  SpO2: (!) 89 % (07/03/23 1542) Vital Signs (24h Range):  Temp:  [97.4 °F (36.3 °C)-98 °F (36.7 °C)] 97.9 °F (36.6 °C)  Pulse:  [82-88] 88  Resp:  [16] 16  SpO2:  [87 %-96 %] 89 %  BP: (141-166)/(68-94) 166/94     Weight: 86.2 kg (190 lb)  Body mass index is 27.26 kg/m².    Intake/Output Summary (Last 24 hours) at 7/3/2023 1611  Last data filed at 7/3/2023 0500  Gross per 24 hour   Intake --   Output 350 ml   Net -350 ml      Physical Exam  Vitals reviewed.   Constitutional:       Appearance: Normal appearance.   HENT:      Head: Normocephalic and atraumatic.      Right Ear: Tympanic membrane and external ear normal.      Nose: Nose normal.      Mouth/Throat:      Mouth: Mucous membranes are moist.   Eyes:      Extraocular Movements: Extraocular  movements intact.      Pupils: Pupils are equal, round, and reactive to light.   Cardiovascular:      Rate and Rhythm: Normal rate and regular rhythm.      Pulses: Normal pulses.      Heart sounds: Normal heart sounds.   Pulmonary:      Effort: Pulmonary effort is normal.      Breath sounds: Normal breath sounds.   Abdominal:      General: Abdomen is flat. Bowel sounds are normal.      Palpations: Abdomen is soft.   Musculoskeletal:         General: Swelling, tenderness and deformity present. Normal range of motion.      Cervical back: Normal range of motion and neck supple.   Skin:     General: Skin is warm and dry.      Findings: Bruising present.   Neurological:      General: No focal deficit present.      Mental Status: He is alert and oriented to person, place, and time.   Psychiatric:         Mood and Affect: Mood normal.         Behavior: Behavior normal.         Overview/Hospital Course: stable    Significant Labs: All pertinent labs within the past 24 hours have been reviewed.  Lab Results   Component Value Date    WBC 6.17 07/03/2023    HGB 8.6 (L) 07/03/2023    HCT 27.2 (L) 07/03/2023    MCV 76.8 (L) 07/03/2023     (H) 07/03/2023         Recent Labs   Lab 07/03/23  0531   *   K 4.9   CO2 20*   BUN 35.9*   CREATININE 6.69*   GLUCOSE 95   CALCIUM 8.1*       Significant Imaging: I have reviewed all pertinent imaging results/findings within the past 24 hours.    Assessment/Plan:      Active Diagnoses:    Diagnosis Date Noted POA    PRINCIPAL PROBLEM:  Abscess of bursa of right knee [M71.061] 06/28/2023 Yes      Problems Resolved During this Admission:     VTE Risk Mitigation (From admission, onward)           Ordered     enoxaparin injection 30 mg  Daily         07/03/23 1218     IP VTE HIGH RISK PATIENT  Once         06/28/23 2059     Place sequential compression device  Until discontinued         06/28/23 2059                   Sepsis  Right knee burisitis  S/p aspirationa nd subsequent I and d  with wound vac placement  Anemia, unspecified  [place] acidosis  Hyponatremia  Hyperkalemia  Htn  Neurogenic bladder  Afib  Metabolic derangements  Metabolic encephalopathy  Decubitus state  H/o spinal cord injury wheel jose luis bound tobacco abuse htn hld neurogenic bladder dm htn neuropathy     Plan :  Ivf  Iv abx  Replace lytes as needed  Ortho reccs  Renal reccs  Avoid nephrotoxins  Potential placement  Cm for ltac eval  Gi and dvt ppx    Phil Jonas MD  Department of Hospital Medicine   [facility] - 9th Floor Med Surg

## 2023-07-03 NOTE — PLAN OF CARE
Dr LOKI Little  rounded, Wants to Refer patient for LTAC placement -- [facility]. Need to work on this Wednesday, 07/05/2023.

## 2023-07-03 NOTE — PROGRESS NOTES
Pharmacist Renal Dose Adjustment Note    Bianca Khan is a 58 y.o. male being treated with the medication lovenox    Patient Data:    Vital Signs (Most Recent):  Temp: 97.6 °F (36.4 °C) (07/03/23 1119)  Pulse: 84 (07/03/23 1119)  Resp: 16 (07/03/23 0655)  BP: (!) 159/86 (07/03/23 1119)  SpO2: 96 % (07/03/23 1119) Vital Signs (72h Range):  Temp:  [97.4 °F (36.3 °C)-98.1 °F (36.7 °C)]   Pulse:  [80-95]   Resp:  [16-18]   BP: (127-159)/(68-87)   SpO2:  [86 %-99 %]      Recent Labs   Lab 07/01/23  0812 07/02/23  0831 07/03/23  0531   CREATININE 3.58* 5.50* 6.69*     Serum creatinine: 6.69 mg/dL (H) 07/03/23 0531  Estimated creatinine clearance: 12.3 mL/min (A)    Medication:lovenox dose: 40mg frequency q24h will be changed to medication:lovenox dose:30mg frequency:q24h based on Crcl = 12.3 mL/min.    Pharmacist's Name: Aleks Burgess  Pharmacist's Extension: [phone]

## 2023-07-03 NOTE — PT/OT/SLP PROGRESS
Pt with O2 at 87% with noted SOB and complaining of pain in neck. Nurse present. Repositioned pt in bed with utilization of wedge and pillows for positioning. Pressure relief boots donned and all needs within reach.

## 2023-07-03 NOTE — PROGRESS NOTES
Progress Note  Nephrology    Admit Date: 6/28/2023   LOS: 5 days     SUBJECTIVE:     Follow-up For:  NATON / ATN    Interval History:  59 Y/O male with H/O paraplegia and H/O neurogenic bladder with supra pubic cath chronic and H/O right knee osteomyelitis and chronic infection admitted due to Right knee infection with strep agalactia   Currently on antibiotics  , was on vanc with very high trough level   Admitted with creatinin of 0.9 and todays creatinin is 6.6    Pt today complaining of being SOB  Urine out put recorded as 300 cc for past 24 hrs     Review of Systems:  Constitutional: No fever or chills  Respiratory: No cough or shortness of breath pt is SOB on lying down and orthopnea positive   Cardiovascular: No chest pain or palpitations  Gastrointestinal: No nausea or vomiting  Neurological: No confusion or weakness  Has suprapubic catheter     OBJECTIVE:     Vital Signs Range (Last 24H):  Vitals:    07/03/23 1119   BP: (!) 159/86   Pulse: 84   Resp:    Temp: 97.6 °F (36.4 °C)       Temp:  [97.4 °F (36.3 °C)-98 °F (36.7 °C)]   Pulse:  [83-88]   Resp:  [16]   BP: (141-159)/(68-86)   SpO2:  [86 %-96 %]     I & O (Last 24H):  Intake/Output Summary (Last 24 hours) at 7/3/2023 1440  Last data filed at 7/3/2023 0500  Gross per 24 hour   Intake 360 ml   Output 350 ml   Net 10 ml       Physical Exam:  Alert , oriented , in mild respiratory distress  Head   normal   Neck   supple   Chest   symmetric , no retraction   Lungs   clear to auscultation   Heart   RRR  Abdomen   soft , supra pubic cath in place   Ext   1+ edema at Right lower ext     Laboratory Data:    Recent Labs   Lab 07/03/23  0531   *   K 4.9   CO2 20*   BUN 35.9*   CREATININE 6.69*   GLUCOSE 95   CALCIUM 8.1*     Lab Results   Component Value Date    CALCIUM 8.1 (L) 07/03/2023    PHOS 4.2 08/10/2022     Lab Results   Component Value Date    IRON 35 (L) 12/30/2021    TIBC 284 12/30/2021    FERRITIN 24.99 12/30/2021       Medications:  Medication  list was reviewed and changes noted under Assessment/Plan.    Diagnostic Results:    Reviewed most recent CT/US/Echo/MRI    ASSESSMENT/PLAN:   1   ANTON / ATN  2   hyponatremia    fluid overload ?  3   paraplegia   4   Right knee infection   5   neurogenic bladder  , supra pubic cath    6   Anemia  7   HTN  8   DM2  9   A Fib         Plan   will start lasix 40 mg BID   if not better by tomorrow  will start dialysis

## 2023-07-03 NOTE — NURSING
07/03/23 1100        Incision/Site 06/29/23 1103 Right Leg   Date First Assessed/Time First Assessed: 06/29/23 1103   Side: Right  Location: Leg   Wound Image   (2.5x 1.5x2cm)   Dressing Appearance Intact;Moist drainage   Drainage Amount Small   Drainage Characteristics/Odor Serosanguineous;No odor   Appearance Red;Moist   Red (%), Wound Tissue Color 100 %   Periwound Area Macerated  (treated with skin prep)   Wound Edges Irregular   Wound Length (cm) 2.5 cm   Wound Width (cm) 1.5 cm   Wound Depth (cm) 2 cm   Wound Volume (cm^3) 7.5 cm^3   Wound Surface Area (cm^2) 3.75 cm^2   Undermining (depth (cm)/location) 11-3 o ck 5cm   Care Cleansed with:;Sterile normal saline   Dressing Changed        Negative Pressure Wound Therapy  06/29/23 1120 Right anterior   Placement Date/Time: 06/29/23 1120   Side: Right  Orientation: anterior  Location: Leg   NPWT Type Vacuum Therapy   Therapy Setting NPWT Continuous therapy   Pressure Setting NPWT 125 mmHg   Sponges Inserted NPWT Black;White;1   Sponges Removed NPWT Black;1        Altered Skin Integrity 06/28/23 2230 Right lateral Ankle #6   Date First Assessed/Time First Assessed: 06/28/23 2230   Altered Skin Integrity Present on Admission - Did Patient arrive to the hospital with altered skin?: yes  Side: Right  Orientation: lateral  Location: Ankle  Wound Number: #6  Is this injury dev...   Wound Image    Description of Altered Skin Integrity Partial thickness tissue loss. Shallow open ulcer with a red or pink wound bed, without slough. Intact or Open/Ruptured Serum-filled blister.   Dressing Appearance Intact;Moist drainage   Drainage Amount Scant   Drainage Characteristics/Odor Serosanguineous;No odor   Appearance Red;Moist   Tissue loss description Partial thickness   Red (%), Wound Tissue Color 100 %   Periwound Area Dry;Intact   Wound Edges Irregular   Wound Length (cm) 4 cm   Wound Width (cm) 3 cm   Wound Depth (cm) 0.6 cm   Wound Volume (cm^3) 7.2 cm^3   Wound Surface  Area (cm^2) 12 cm^2   Care Cleansed with:;Sterile normal saline   Dressing Changed;Calcium alginate;Gauze   Safety   Safety Precautions emergency equipment at bedside   Safety Management   Safety Promotion/Fall Prevention assistive device/personal item within reach   Patient Rounds bed in low position;bed wheels locked;call light in patient/parent reach     WOCN follow-up- wd vac right knee.   Changed it out.   Pt tolerated it well.  No changes to care at this time.  Slow progress.  Care concerns with nurse Howard.

## 2023-07-04 LAB
ALBUMIN SERPL-MCNC: 2 G/DL (ref 3.5–5)
ALBUMIN/GLOB SERPL: 0.5 RATIO (ref 1.1–2)
ALLENS TEST BLOOD GAS (OHS): YES
ALP SERPL-CCNC: 59 UNIT/L (ref 40–150)
ALT SERPL-CCNC: 7 UNIT/L (ref 0–55)
AST SERPL-CCNC: 25 UNIT/L (ref 5–34)
AV INDEX (PROSTH): 0.7
AV MEAN GRADIENT: 2 MMHG
AV PEAK GRADIENT: 4 MMHG
AV VALVE AREA: 2.21 CM2
AV VELOCITY RATIO: 0.79
BASE EXCESS BLD CALC-SCNC: 0.2 MMOL/L
BASOPHILS # BLD AUTO: 0.01 X10(3)/MCL
BASOPHILS NFR BLD AUTO: 0.1 %
BILIRUBIN DIRECT+TOT PNL SERPL-MCNC: 0.4 MG/DL
BLOOD GAS SAMPLE TYPE (OHS): ABNORMAL
BNP BLD-MCNC: 593.2 PG/ML
BSA FOR ECHO PROCEDURE: 2.06 M2
BUN SERPL-MCNC: 44.6 MG/DL (ref 8.4–25.7)
CA-I BLD-SCNC: 1.04 MMOL/L (ref 1.12–1.23)
CALCIUM SERPL-MCNC: 8.1 MG/DL (ref 8.4–10.2)
CHLORIDE SERPL-SCNC: 91 MMOL/L (ref 98–107)
CK SERPL-CCNC: 80 U/L (ref 30–200)
CO2 BLDA-SCNC: 27.4 MMOL/L
CO2 SERPL-SCNC: 24 MMOL/L (ref 22–29)
CREAT SERPL-MCNC: 8.12 MG/DL (ref 0.73–1.18)
CV ECHO LV RWT: 0.5 CM
DOP CALC AO PEAK VEL: 0.98 M/S
DOP CALC AO VTI: 20.6 CM
DOP CALC LVOT AREA: 3.1 CM2
DOP CALC LVOT DIAMETER: 2 CM
DOP CALC LVOT PEAK VEL: 0.77 M/S
DOP CALC LVOT STROKE VOLUME: 45.53 CM3
DOP CALC MV VTI: 20.4 CM
DOP CALCLVOT PEAK VEL VTI: 14.5 CM
DRAWN BY BLOOD GAS (OHS): ABNORMAL
E WAVE DECELERATION TIME: 151 MSEC
E/A RATIO: 1.94
E/E' RATIO: 9.43 M/S
ECHO LV POSTERIOR WALL: 1.04 CM (ref 0.6–1.1)
EJECTION FRACTION: 55 %
EOSINOPHIL # BLD AUTO: 0.01 X10(3)/MCL (ref 0–0.9)
EOSINOPHIL NFR BLD AUTO: 0.1 %
ERYTHROCYTE [DISTWIDTH] IN BLOOD BY AUTOMATED COUNT: 17.2 % (ref 11.5–17)
FIO2 BLOOD GAS (OHS): 100 %
FRACTIONAL SHORTENING: 32 % (ref 28–44)
GFR SERPLBLD CREATININE-BSD FMLA CKD-EPI: 7 MLS/MIN/1.73/M2
GLOBULIN SER-MCNC: 4.4 GM/DL (ref 2.4–3.5)
GLUCOSE SERPL-MCNC: 110 MG/DL (ref 74–100)
HBV SURFACE AG SERPL QL IA: NONREACTIVE
HCO3 BLDA-SCNC: 26 MMOL/L
HCT VFR BLD AUTO: 26 % (ref 42–52)
HGB BLD-MCNC: 8.3 G/DL (ref 14–18)
IMM GRANULOCYTES # BLD AUTO: 0.14 X10(3)/MCL (ref 0–0.04)
IMM GRANULOCYTES NFR BLD AUTO: 1.1 %
INTERVENTRICULAR SEPTUM: 0.99 CM (ref 0.6–1.1)
LDH SERPL-CCNC: 347 U/L (ref 125–220)
LEFT ATRIUM SIZE: 3.5 CM
LEFT ATRIUM VOLUME INDEX MOD: 32.5 ML/M2
LEFT ATRIUM VOLUME MOD: 66.2 CM3
LEFT INTERNAL DIMENSION IN SYSTOLE: 2.86 CM (ref 2.1–4)
LEFT VENTRICLE DIASTOLIC VOLUME INDEX: 38.09 ML/M2
LEFT VENTRICLE DIASTOLIC VOLUME: 77.7 ML
LEFT VENTRICLE MASS INDEX: 68 G/M2
LEFT VENTRICLE SYSTOLIC VOLUME INDEX: 15.2 ML/M2
LEFT VENTRICLE SYSTOLIC VOLUME: 31.1 ML
LEFT VENTRICULAR INTERNAL DIMENSION IN DIASTOLE: 4.18 CM (ref 3.5–6)
LEFT VENTRICULAR MASS: 139.09 G
LV LATERAL E/E' RATIO: 7.07 M/S
LV SEPTAL E/E' RATIO: 14.14 M/S
LVOT MG: 1 MMHG
LVOT MV: 0.47 CM/S
LYMPHOCYTES # BLD AUTO: 0.87 X10(3)/MCL (ref 0.6–4.6)
LYMPHOCYTES NFR BLD AUTO: 7.1 %
MAGNESIUM SERPL-MCNC: 1.8 MG/DL (ref 1.6–2.6)
MCH RBC QN AUTO: 24.3 PG (ref 27–31)
MCHC RBC AUTO-ENTMCNC: 31.9 G/DL (ref 33–36)
MCV RBC AUTO: 76.2 FL (ref 80–94)
MECH RR (OHS): 20 B/MIN
MECH VT (OHS): 500 ML
MODE (OHS): ABNORMAL
MONOCYTES # BLD AUTO: 0.49 X10(3)/MCL (ref 0.1–1.3)
MONOCYTES NFR BLD AUTO: 4 %
MRSA PCR SCRN (OHS): NOT DETECTED
MV MEAN GRADIENT: 1 MMHG
MV PEAK A VEL: 0.51 M/S
MV PEAK E VEL: 0.99 M/S
MV PEAK GRADIENT: 3 MMHG
MV VALVE AREA BY CONTINUITY EQUATION: 2.23 CM2
NEUTROPHILS # BLD AUTO: 10.7 X10(3)/MCL (ref 2.1–9.2)
NEUTROPHILS NFR BLD AUTO: 87.6 %
NRBC BLD AUTO-RTO: 0 %
OHS LV EJECTION FRACTION SIMPSONS BIPLANE MOD: 6 %
OXYGEN DEVICE BLOOD GAS (OHS): ABNORMAL
PCO2 BLDA: 46 MMHG (ref 35–45)
PEEP (OHS): 10 CMH2O
PH BLDA: 7.36 [PH] (ref 7.35–7.45)
PHOSPHATE SERPL-MCNC: 7.3 MG/DL (ref 2.3–4.7)
PISA TR MAX VEL: 3.24 M/S
PLATELET # BLD AUTO: 605 X10(3)/MCL (ref 130–400)
PMV BLD AUTO: 9.6 FL (ref 7.4–10.4)
PO2 BLDA: 214 MMHG (ref 80–100)
POTASSIUM BLOOD GAS (OHS): 5.5 MMOL/L (ref 3.5–5)
POTASSIUM SERPL-SCNC: 6 MMOL/L (ref 3.5–5.1)
PROT SERPL-MCNC: 6.4 GM/DL (ref 6.4–8.3)
PS (OHS): 10 CMH2O
PTH-INTACT SERPL-MCNC: 377.3 PG/ML (ref 8.7–77)
RA MAJOR: 4.5 CM
RA PRESSURE: 8 MMHG
RA WIDTH: 4.04 CM
RBC # BLD AUTO: 3.41 X10(6)/MCL (ref 4.7–6.1)
RIGHT VENTRICULAR END-DIASTOLIC DIMENSION: 3.7 CM
SAMPLE SITE BLOOD GAS (OHS): ABNORMAL
SAO2 % BLDA: 100 %
SODIUM BLOOD GAS (OHS): 123 MMOL/L (ref 137–145)
SODIUM SERPL-SCNC: 123 MMOL/L (ref 136–145)
TDI LATERAL: 0.14 M/S
TDI SEPTAL: 0.07 M/S
TDI: 0.11 M/S
TR MAX PG: 42 MMHG
TRICUSPID ANNULAR PLANE SYSTOLIC EXCURSION: 1.75 CM
TV REST PULMONARY ARTERY PRESSURE: 50 MMHG
URATE SERPL-MCNC: 8.5 MG/DL (ref 3.5–7.2)
WBC # SPEC AUTO: 12.22 X10(3)/MCL (ref 4.5–11.5)

## 2023-07-04 PROCEDURE — 99900035 HC TECH TIME PER 15 MIN (STAT)

## 2023-07-04 PROCEDURE — 25000003 PHARM REV CODE 250: Performed by: STUDENT IN AN ORGANIZED HEALTH CARE EDUCATION/TRAINING PROGRAM

## 2023-07-04 PROCEDURE — 87070 CULTURE OTHR SPECIMN AEROBIC: CPT | Performed by: STUDENT IN AN ORGANIZED HEALTH CARE EDUCATION/TRAINING PROGRAM

## 2023-07-04 PROCEDURE — 90935 HEMODIALYSIS ONE EVALUATION: CPT

## 2023-07-04 PROCEDURE — 63600175 PHARM REV CODE 636 W HCPCS: Performed by: INTERNAL MEDICINE

## 2023-07-04 PROCEDURE — 87040 BLOOD CULTURE FOR BACTERIA: CPT | Performed by: STUDENT IN AN ORGANIZED HEALTH CARE EDUCATION/TRAINING PROGRAM

## 2023-07-04 PROCEDURE — 25000003 PHARM REV CODE 250: Performed by: INTERNAL MEDICINE

## 2023-07-04 PROCEDURE — 87641 MR-STAPH DNA AMP PROBE: CPT | Performed by: STUDENT IN AN ORGANIZED HEALTH CARE EDUCATION/TRAINING PROGRAM

## 2023-07-04 PROCEDURE — 83615 LACTATE (LD) (LDH) ENZYME: CPT | Performed by: INTERNAL MEDICINE

## 2023-07-04 PROCEDURE — 82550 ASSAY OF CK (CPK): CPT | Performed by: INTERNAL MEDICINE

## 2023-07-04 PROCEDURE — 27000221 HC OXYGEN, UP TO 24 HOURS

## 2023-07-04 PROCEDURE — 94761 N-INVAS EAR/PLS OXIMETRY MLT: CPT

## 2023-07-04 PROCEDURE — 99900031 HC PATIENT EDUCATION (STAT)

## 2023-07-04 PROCEDURE — 84100 ASSAY OF PHOSPHORUS: CPT | Performed by: INTERNAL MEDICINE

## 2023-07-04 PROCEDURE — 20000000 HC ICU ROOM

## 2023-07-04 PROCEDURE — 85025 COMPLETE CBC W/AUTO DIFF WBC: CPT | Performed by: STUDENT IN AN ORGANIZED HEALTH CARE EDUCATION/TRAINING PROGRAM

## 2023-07-04 PROCEDURE — 82803 BLOOD GASES ANY COMBINATION: CPT

## 2023-07-04 PROCEDURE — 94002 VENT MGMT INPAT INIT DAY: CPT

## 2023-07-04 PROCEDURE — 83880 ASSAY OF NATRIURETIC PEPTIDE: CPT | Performed by: INTERNAL MEDICINE

## 2023-07-04 PROCEDURE — 99900026 HC AIRWAY MAINTENANCE (STAT)

## 2023-07-04 PROCEDURE — 63600175 PHARM REV CODE 636 W HCPCS: Performed by: STUDENT IN AN ORGANIZED HEALTH CARE EDUCATION/TRAINING PROGRAM

## 2023-07-04 PROCEDURE — 83735 ASSAY OF MAGNESIUM: CPT | Performed by: INTERNAL MEDICINE

## 2023-07-04 PROCEDURE — 83970 ASSAY OF PARATHORMONE: CPT | Performed by: INTERNAL MEDICINE

## 2023-07-04 PROCEDURE — 80053 COMPREHEN METABOLIC PANEL: CPT | Performed by: STUDENT IN AN ORGANIZED HEALTH CARE EDUCATION/TRAINING PROGRAM

## 2023-07-04 PROCEDURE — 87340 HEPATITIS B SURFACE AG IA: CPT | Performed by: INTERNAL MEDICINE

## 2023-07-04 PROCEDURE — 27200966 HC CLOSED SUCTION SYSTEM

## 2023-07-04 PROCEDURE — 36600 WITHDRAWAL OF ARTERIAL BLOOD: CPT

## 2023-07-04 PROCEDURE — 63600175 PHARM REV CODE 636 W HCPCS

## 2023-07-04 PROCEDURE — 84550 ASSAY OF BLOOD/URIC ACID: CPT | Performed by: INTERNAL MEDICINE

## 2023-07-04 RX ORDER — PROPOFOL 10 MG/ML
0-50 INJECTION, EMULSION INTRAVENOUS CONTINUOUS
Status: DISCONTINUED | OUTPATIENT
Start: 2023-07-04 | End: 2023-07-21 | Stop reason: SDUPTHER

## 2023-07-04 RX ORDER — AMLODIPINE BESYLATE 5 MG/1
5 TABLET ORAL DAILY
Status: DISCONTINUED | OUTPATIENT
Start: 2023-07-05 | End: 2023-07-12

## 2023-07-04 RX ORDER — ACETAMINOPHEN 325 MG/1
650 TABLET ORAL EVERY 8 HOURS PRN
Status: DISCONTINUED | OUTPATIENT
Start: 2023-07-04 | End: 2023-07-12

## 2023-07-04 RX ORDER — CARVEDILOL 3.12 MG/1
6.25 TABLET ORAL 2 TIMES DAILY
Status: DISCONTINUED | OUTPATIENT
Start: 2023-07-04 | End: 2023-07-12

## 2023-07-04 RX ORDER — NOREPINEPHRINE BITARTRATE/D5W 8 MG/250ML
0-3 PLASTIC BAG, INJECTION (ML) INTRAVENOUS CONTINUOUS
Status: DISCONTINUED | OUTPATIENT
Start: 2023-07-04 | End: 2023-08-05

## 2023-07-04 RX ORDER — FENOFIBRATE 48 MG/1
48 TABLET, FILM COATED ORAL DAILY
Status: DISCONTINUED | OUTPATIENT
Start: 2023-07-05 | End: 2023-07-12

## 2023-07-04 RX ORDER — FENTANYL CITRATE 50 UG/ML
100 INJECTION, SOLUTION INTRAMUSCULAR; INTRAVENOUS ONCE
Status: COMPLETED | OUTPATIENT
Start: 2023-07-04 | End: 2023-07-04

## 2023-07-04 RX ORDER — FENTANYL CITRATE 50 UG/ML
INJECTION, SOLUTION INTRAMUSCULAR; INTRAVENOUS
Status: COMPLETED
Start: 2023-07-04 | End: 2023-07-04

## 2023-07-04 RX ORDER — PROPOFOL 10 MG/ML
INJECTION, EMULSION INTRAVENOUS
Status: DISPENSED
Start: 2023-07-04 | End: 2023-07-04

## 2023-07-04 RX ORDER — NOREPINEPHRINE BITARTRATE/D5W 8 MG/250ML
PLASTIC BAG, INJECTION (ML) INTRAVENOUS
Status: DISPENSED
Start: 2023-07-04 | End: 2023-07-04

## 2023-07-04 RX ORDER — FAMOTIDINE 10 MG/ML
20 INJECTION INTRAVENOUS DAILY
Status: DISCONTINUED | OUTPATIENT
Start: 2023-07-04 | End: 2023-08-08

## 2023-07-04 RX ORDER — MUPIROCIN 20 MG/G
OINTMENT TOPICAL 2 TIMES DAILY
Status: COMPLETED | OUTPATIENT
Start: 2023-07-04 | End: 2023-07-08

## 2023-07-04 RX ADMIN — MICONAZOLE NITRATE 2 % TOPICAL POWDER: at 11:07

## 2023-07-04 RX ADMIN — OXYBUTYNIN CHLORIDE 5 MG: 5 TABLET ORAL at 07:07

## 2023-07-04 RX ADMIN — MICONAZOLE NITRATE 2 % TOPICAL POWDER: at 08:07

## 2023-07-04 RX ADMIN — FENOFIBRATE 145 MG: 145 TABLET, FILM COATED ORAL at 07:07

## 2023-07-04 RX ADMIN — MEROPENEM 1 G: 1 INJECTION, POWDER, FOR SOLUTION INTRAVENOUS at 08:07

## 2023-07-04 RX ADMIN — DOXYCYCLINE HYCLATE 100 MG: 100 TABLET, COATED ORAL at 08:07

## 2023-07-04 RX ADMIN — DOXYCYCLINE HYCLATE 100 MG: 100 TABLET, COATED ORAL at 07:07

## 2023-07-04 RX ADMIN — NOREPINEPHRINE BITARTRATE 0.02 MCG/KG/MIN: 8 INJECTION, SOLUTION INTRAVENOUS at 11:07

## 2023-07-04 RX ADMIN — PROPOFOL 35 MCG/KG/MIN: 10 INJECTION, EMULSION INTRAVENOUS at 04:07

## 2023-07-04 RX ADMIN — OXYBUTYNIN CHLORIDE 5 MG: 5 TABLET ORAL at 08:07

## 2023-07-04 RX ADMIN — SODIUM BICARBONATE 650 MG TABLET 650 MG: at 08:07

## 2023-07-04 RX ADMIN — PROPOFOL 35 MCG/KG/MIN: 10 INJECTION, EMULSION INTRAVENOUS at 10:07

## 2023-07-04 RX ADMIN — CARVEDILOL 6.25 MG: 3.12 TABLET, FILM COATED ORAL at 08:07

## 2023-07-04 RX ADMIN — FENTANYL CITRATE 100 MCG: 50 INJECTION, SOLUTION INTRAMUSCULAR; INTRAVENOUS at 11:07

## 2023-07-04 RX ADMIN — PROPOFOL 30 MCG/KG/MIN: 10 INJECTION, EMULSION INTRAVENOUS at 08:07

## 2023-07-04 RX ADMIN — FAMOTIDINE 20 MG: 10 INJECTION, SOLUTION INTRAVENOUS at 08:07

## 2023-07-04 RX ADMIN — ATORVASTATIN CALCIUM 20 MG: 10 TABLET, FILM COATED ORAL at 08:07

## 2023-07-04 RX ADMIN — PROPOFOL 35 MCG/KG/MIN: 10 INJECTION, EMULSION INTRAVENOUS at 05:07

## 2023-07-04 RX ADMIN — PROPOFOL 40 MCG/KG/MIN: 10 INJECTION, EMULSION INTRAVENOUS at 12:07

## 2023-07-04 RX ADMIN — CEFTRIAXONE SODIUM 2 G: 2 INJECTION, POWDER, FOR SOLUTION INTRAMUSCULAR; INTRAVENOUS at 12:07

## 2023-07-04 RX ADMIN — MUPIROCIN: 20 OINTMENT TOPICAL at 08:07

## 2023-07-04 RX ADMIN — MEROPENEM 1 G: 1 INJECTION, POWDER, FOR SOLUTION INTRAVENOUS at 09:07

## 2023-07-04 RX ADMIN — ENOXAPARIN SODIUM 30 MG: 30 INJECTION SUBCUTANEOUS at 04:07

## 2023-07-04 RX ADMIN — SODIUM BICARBONATE 650 MG TABLET 650 MG: at 07:07

## 2023-07-04 NOTE — PT/OT/SLP PROGRESS
Physical Therapy      Patient Name:  Bianca Khan   MRN:  [id number]    Pt with decline in medical status and tf to ICU. PT signing off, please re-consult when appropriate.

## 2023-07-04 NOTE — NURSING
Nurses Note -- 4 Eyes      7/4/2023   2:47 PM      Skin assessed during: Daily Assessment      [] No Altered Skin Integrity Present    []Prevention Measures Documented      [x] Yes- Altered Skin Integrity Present or Discovered   [] LDA Added if Not in Epic (Describe Wound)   [] New Altered Skin Integrity was Present on Admit and Documented in LDA   [] Wound Image Taken    Wound Care Consulted? Yes    Attending Nurse:  Natalya Sal RN     Second RN/Staff Member:  Taylor URBANO

## 2023-07-04 NOTE — NURSING
07/04/23 1242   Post-Hemodialysis Assessment   Blood Volume Processed (Liters) 29.9 L   Dialyzer Clearance Lightly streaked   Duration of Treatment 120 minutes   Total UF (mL) 1000 mL   Patient Response to Treatment Tolerated well   Post-Hemodialysis Comments Treatment completed. NET fluid removed = 1 liter. Right CVC with frequent positional alarms. Sedation increased during tx which helped CVC function better. No other issues. Tolerated well

## 2023-07-04 NOTE — NURSING
Wound care performed on BL buttock wounds, cleansed with dermal cleanser, mepilex applied, L heel wound cleansed w/ dermal cleanser, mepilex applied, R ankle would care performed, cleansed w/ dermal cleanser-sliver alginate/abd/rolled gauze applied

## 2023-07-04 NOTE — PT/OT/SLP PROGRESS
Pt with decline in medical status, transferred to ICU.  OT signing off, please re-consult when pt appropriate.

## 2023-07-04 NOTE — NURSING
Patient's oxygen level dropped to 75%, as seen from the monitor at the nurses station. On entering the room, patient had removed his nasal cannula and his wife was trying to put it back on. Patient's oxygen had dropped to 48% before nasal cannula was replaced. RRT was called, patient was then transferred to ICU due to respiratory distress and GCS of 10.

## 2023-07-04 NOTE — H&P
[facility] - [facility] ICU  Pulmonary Critical Care Note    Patient Name: Bianca Khan  MRN: [id number]  Admission Date: 6/28/2023  Hospital Length of Stay: 6 days  Code Status: Full Code  Attending Provider: Osmin Townsend MD  Primary Care Provider: Areli Vargas PA-C     Subjective:     HPI:   Bianca Khan is a 58 y.o. male with PMH of paraplegia 2/2 spinal cord injury (T1-T6), neurogenic bladder with suprapubic catheter, chronic right ankle osteomyelitis, DM II, HTN, who presented to the ED on 6/28/2023 with CC of right knee pain. Per chart review, CT of right knee revealed 5 cm area of subcutaneous fluid in prepatellar region which was aspirated in the ED; he was taken to the OR on 6/29/2023 by orthopedic surgery team and was found to have prepatellar bursa infection and septic arthritis. Wound culture 6/29/2023 positive for strep agalactiae. Orthopedic surgery team recommended right-sided above-knee amputation d/t chronically infected hardware in right lower extremity. On 7/4/2023, a RRT was called d/t acute respiratory distress that was not improving despite being placed on vapotherm at 35 L/min with FiO2 100%. At the time of examination, patient's initial GCS was 10 but progressively reduced to a GCS of 6. Shared decision with patient's wife was made to intubate patient for airway protection though ABGs were within normal limits. He is admitted to the ICU for close monitoring and further medical management.    Hospital Course/Significant events:  6/29/2023 - I&D of right knee  7/4/2023 - Admitted to ICU, intubated for airway protection d/t altered mental status    24 Hour Interval History:  Pending    Past Medical History:   Diagnosis Date    Arthritis     Chronic ulcer of ankle 05/26/2022    Frequent UTI 07/02/2019    Generalized anxiety disorder 05/26/2022    Neurogenic bladder 05/26/2022    Osteomyelitis 05/26/2022    Presence of suprapubic catheter 05/26/2022    Pure  hypercholesterolemia 2022    Retention of urine, unspecified 2019    Spinal cord injury at T1-T6 level 2018       Past Surgical History:   Procedure Laterality Date    INCISION AND DRAINAGE, LOWER EXTREMITY Right 2023    Procedure: INCISION AND DRAINAGE, LOWER EXTREMITY;  Surgeon: Prabhu Shen DO;  Location: [facility];  Service: Orthopedics;  Laterality: Right;  supine bone foam wash stuff cultures    INSERTION OF INTRAMEDULLARY MARISA Right 8/10/2022    Procedure: INSERTION, INTRAMEDULLARY MARISA RIGHT TIBIA;  Surgeon: Jorge Orellana MD;  Location: [facility];  Service: Orthopedics;  Laterality: Right;       Social History     Socioeconomic History    Marital status:    Tobacco Use    Smoking status: Every Day     Types: Cigars, Cigarettes    Smokeless tobacco: Never   Substance and Sexual Activity    Alcohol use: Yes     Alcohol/week: 2.0 standard drinks     Types: 2 Cans of beer per week    Drug use: Not Currently    Sexual activity: Never       Current Outpatient Medications   Medication Instructions    amitriptyline (ELAVIL) 50 mg, Oral, Nightly    amLODIPine (NORVASC) 10 MG tablet 1 tablet    atorvastatin (LIPITOR) 20 mg, Oral, Nightly    busPIRone (BUSPAR) 5 MG Tab 1 tablet    carvediloL (COREG) 12.5 mg, Oral    cyclobenzaprine (FLEXERIL) 10 mg, Oral, 2 times daily PRN    doxycycline (VIBRAMYCIN) 100 mg, Oral, Daily    fenofibrate 160 mg, Oral    FLUoxetine 20 mg, Oral    gabapentin (NEURONTIN) 300 MG capsule 1 capsule    lisinopriL 10 mg, Oral, Daily    LORazepam (ATIVAN) 1 mg, Oral, 2 times daily    melatonin (MELATIN) 3 mg tablet TAKE TWO TABLETS BY MOUTH EVERY NIGHT AT BEDTIME AS NEEDED FOR INSOMNIA.    meloxicam (MOBIC) 15 mg, Oral, Daily    metFORMIN (GLUCOPHAGE) 500 MG tablet SMARTSI Tablet(s) By Mouth Morning-Evening    oxybutynin (DITROPAN) 5 mg, Oral, 2 times daily    oxyCODONE-acetaminophen (PERCOCET)  mg per tablet 1 tablet, Oral, Every 6 hours PRN    pantoprazole  (PROTONIX) 40 MG tablet 1 tablet    temazepam (RESTORIL) 30 mg, Oral, Nightly    traZODone (DESYREL) 50 mg, Oral, Nightly    XARELTO 20 mg Tab SMARTSI Tablet(s) By Mouth Every Evening     Current Inpatient Medications   propofoL        amitriptyline  50 mg Oral QHS    amLODIPine  10 mg Oral Daily    atorvastatin  20 mg Oral Nightly    busPIRone  5 mg Oral BID    carvediloL  12.5 mg Oral BID    cefTRIAXone (ROCEPHIN) IVPB  2 g Intravenous Q24H    doxycycline  100 mg Oral Q12H    enoxaparin  30 mg Subcutaneous Daily    fenofibrate  145 mg Oral Daily    FLUoxetine  20 mg Oral Daily    furosemide (LASIX) injection  40 mg Intravenous BID    gabapentin  100 mg Oral BID    methocarbamoL  500 mg Oral TID    miconazole NITRATE 2 %   Topical (Top) BID    oxybutynin  5 mg Oral BID    pantoprazole  40 mg Intravenous Daily    polyethylene glycol  17 g Oral Daily    sodium bicarbonate  650 mg Oral BID    sodium citrate-citric acid 500-334 mg/5 ml  30 mL Oral Once    traZODone  50 mg Oral Nightly     Current Intravenous Infusions   sodium bicarbonate drip 100 mL/hr at 237     Review of Systems   Reason unable to perform ROS: Intubated.      Objective:     Intake/Output Summary (Last 24 hours) at 2023 0426  Last data filed at 7/3/2023 0500  Gross per 24 hour   Intake --   Output 100 ml   Net -100 ml     Vital Signs (Most Recent):  Temp: 97.9 °F (36.6 °C) (23 2335)  Pulse: 82 (23)  Resp: (!) 21 (23)  BP: 129/81 (23)  SpO2: 100 % (23)  Body mass index is 27.26 kg/m².  Weight: 86.2 kg (190 lb) Vital Signs (24h Range):  Temp:  [97 °F (36.1 °C)-97.9 °F (36.6 °C)] 97.9 °F (36.6 °C)  Pulse:  [67-88] 82  Resp:  [16-21] 21  SpO2:  [89 %-100 %] 100 %  BP: (129-166)/(79-94) 129/81     Physical Exam  General: Ill-appearing  HEENT: NC/AT; PERRL; nasal and oral mucosa moist and clear; ET tube in place  Pulm: Crackles in lower lobes, rhonchi in right upper lobe, mechanically  ventilated  CV: S1, S2 w/o murmurs or gallops; 1+ edema in BLE noted  GI: Distended abdomen with hypoactive bowel sound in all quadrants; abdominal rigidity palpated  MSK: Dressing on right knee intact  Neuro: Limited d/t sedation    Lines/Drains/Airways       Drain  Duration                  Suprapubic Catheter -- days              Peripheral Intravenous Line  Duration                  Midline Catheter Insertion/Assessment  - Single Lumen 07/02/23 2128 Right basilic vein (medial side of arm) 1 day                  Significant Labs:  Lab Results   Component Value Date    WBC 6.17 07/03/2023    HGB 8.6 (L) 07/03/2023    HCT 27.2 (L) 07/03/2023    MCV 76.8 (L) 07/03/2023     (H) 07/03/2023       BMP  Lab Results   Component Value Date     (L) 07/03/2023    K 4.9 07/03/2023    CHLORIDE 93 (L) 07/03/2023    CO2 20 (L) 07/03/2023    BUN 35.9 (H) 07/03/2023    CREATININE 6.69 (H) 07/03/2023    CALCIUM 8.1 (L) 07/03/2023    AGAP 9.0 08/29/2022    EGFRNONAA >60 04/27/2022     ABG  Recent Labs   Lab 07/03/23 2013   PH 7.370   PO2 81.0   HCO3 22.5     Mechanical Ventilation Support:  Vent Mode: SIMV (07/04/23 0422)  Set Rate: 20 BPM (07/04/23 0422)  Vt Set: 500 mL (07/04/23 0422)  Pressure Support: 10 cmH20 (07/04/23 0422)  PEEP/CPAP: 10 cmH20 (07/04/23 0422)  Oxygen Concentration (%): 100 (07/03/23 2220)  Peak Airway Pressure: 28 cmH20 (07/04/23 0422)  Total Ve: 8.7 L/m (07/04/23 0422)  F/VT Ratio<105 (RSBI): (!) 50.85 (07/04/23 0422)    Significant Imaging:  I have reviewed the pertinent imaging within the past 24 hours.    Assessment/Plan:     Assessment  Uremic encephalopathy 2/2 acute renal failure  Altered mental status noted on 7/4/2023 and was intubated on 7/4/2023 for airway protection  Acute renal failure likely 2/2 medications (vancomycin) or fluid depletion  Worsening creatinine from 1.57 on 6/30/2023 to 6.69 on 7/3/2023  Creatinine started to trend up since Vancomycin administration on 6/29/2023 and  6/30/2023  Hyponatremia  Sodium level was 131 on 6/28/2023, 125 on 7/3/2023  Septic arthritis  Taken to the OR on 6/29/2023 by orthopedic surgery team and was found to have prepatellar bursa infection and septic arthritis  Wound culture 6/29/2023 positive for strep agalactiae  HX of paraplegia 2/2 spinal cord injury (T1-T6), neurogenic bladder with suprapubic catheter, chronic right ankle osteomyelitis, DM II, HTN    Plan  -Admitted to ICU for ongoing monitoring and medical management  -ID, orthopedic, nephrology, wound care teams following, appreciate their assistance  -Per nephrology team, patient may need dialysis on 7/4/2023 if overall clinical condition is not improving  -Per ID team, continue Ceftriaxone for 6 weeks and suppressive therapy with doxycycline  -Holding anti-depressants and anti-psychotics for now as these medications could worsen hyponatremia  -CXR this morning (per my read) revealed ET tube above angle of marija with persistent opacities bilaterally   -Pending labs/studies: blood cultures x2, sputum culture, MRSA PCR, CT ab/pelvis    DVT Prophylaxis: SCD, lovenox  GI Prophylaxis: Famotidine     32 minutes of critical care was time spent personally by me on the following activities: development of treatment plan with patient or surrogate and bedside caregivers, discussions with consultants, evaluation of patient's response to treatment, examination of patient, ordering and performing treatments and interventions, ordering and review of laboratory studies, ordering and review of radiographic studies, pulse oximetry, re-evaluation of patient's condition.  This critical care time did not overlap with that of any other provider or involve time for any procedures.     Lexis Duong DO, PGY-II  Pulmonary Critical Care Medicine  [facility] - [facility] ICU

## 2023-07-04 NOTE — OP NOTE
[facility]   Temporary Hemodialysis Catheter Placement  Procedure Note     SUMMARY      Date of Procedure:  07/04/2023     Procedure:  Right internal jugular temporary hemodialysis catheter, Maria Del Rosariokar 13 cm Trialysis     Perfomed by: Ingrid Treadwell MD, [facility]     Pre-Procedure Diagnosis:  Acute kidney injury in need of emergent hemodialysis     Post-Procedure Diagnosis: Acute kidney injury in need of emergent hemodialysis     Anesthesia:  Propofol infusion in process       Description of the Findings of the Procedure:      Informed consent was obtained for the procedure by phone from patient's wife, including sedation, risk of lung perforation, hemorrhage, arrhythmia, and adverse drug reactions..      Maximum sterile technique was used, including proper wearing of mask and hat. Proper handwashing with sterile gloving technique and sterile gowning technique was performed prior to procedure. Under sterile conditions, the skin above the right IJ region was prepped with chlorhexidine, which was then allowed to fully dry by  recommendations.  The site was then covered with a sterile drape.  Ultrasound was used to demonstrate proper right IJ vein positioning and compression.  A 16-gauge needle was then inserted into the vein on 1st attempt using ultrasound guidance. A guide wire was then passed easily through the catheter without resistance.  A dilator was then placed over guidewire without significant resistance, and then removed.  A 13cm Mahurkar Trialysis catheter was then placed over guidewire and seated without resistance.  Good blood return and flush was obtained all 3 ports.  The catheter was sutured into place.  Sterile dressing was then applied. Chest x-ray demonstrates good line placement, no evidence of pneumothorax     Complications:  None     Estimated Blood Loss (EBL):  Less than 1 cc           Ingrid Treadwell MD, [facility]

## 2023-07-04 NOTE — PLAN OF CARE
Problem: Adult Inpatient Plan of Care  Goal: Plan of Care Review  Outcome: Ongoing, Progressing  Goal: Patient-Specific Goal (Individualized)  Outcome: Ongoing, Progressing  Goal: Absence of Hospital-Acquired Illness or Injury  Outcome: Ongoing, Progressing  Goal: Optimal Comfort and Wellbeing  Outcome: Ongoing, Progressing  Goal: Readiness for Transition of Care  Outcome: Ongoing, Progressing     Problem: Diabetes Comorbidity  Goal: Blood Glucose Level Within Targeted Range  Outcome: Ongoing, Progressing     Problem: Skin Injury Risk Increased  Goal: Skin Health and Integrity  Outcome: Ongoing, Progressing     Problem: Impaired Wound Healing  Goal: Optimal Wound Healing  Outcome: Ongoing, Progressing     Problem: Pain Acute  Goal: Acceptable Pain Control and Functional Ability  Outcome: Ongoing, Progressing     Problem: Infection  Goal: Absence of Infection Signs and Symptoms  Outcome: Ongoing, Progressing     Problem: Communication Impairment (Mechanical Ventilation, Invasive)  Goal: Effective Communication  Outcome: Ongoing, Progressing     Problem: Device-Related Complication Risk (Mechanical Ventilation, Invasive)  Goal: Optimal Device Function  Outcome: Ongoing, Progressing     Problem: Inability to Wean (Mechanical Ventilation, Invasive)  Goal: Mechanical Ventilation Liberation  Outcome: Ongoing, Progressing     Problem: Nutrition Impairment (Mechanical Ventilation, Invasive)  Goal: Optimal Nutrition Delivery  Outcome: Ongoing, Progressing     Problem: Skin and Tissue Injury (Mechanical Ventilation, Invasive)  Goal: Absence of Device-Related Skin and Tissue Injury  Outcome: Ongoing, Progressing     Problem: Ventilator-Induced Lung Injury (Mechanical Ventilation, Invasive)  Goal: Absence of Ventilator-Induced Lung Injury  Outcome: Ongoing, Progressing     Problem: Communication Impairment (Artificial Airway)  Goal: Effective Communication  Outcome: Ongoing, Progressing     Problem: Device-Related  Complication Risk (Artificial Airway)  Goal: Optimal Device Function  Outcome: Ongoing, Progressing     Problem: Skin and Tissue Injury (Artificial Airway)  Goal: Absence of Device-Related Skin or Tissue Injury  Outcome: Ongoing, Progressing     Problem: Noninvasive Ventilation Acute  Goal: Effective Unassisted Ventilation and Oxygenation  Outcome: Ongoing, Progressing     Problem: Device-Related Complication Risk (Hemodialysis)  Goal: Safe, Effective Therapy Delivery  Outcome: Ongoing, Progressing     Problem: Hemodynamic Instability (Hemodialysis)  Goal: Effective Tissue Perfusion  Outcome: Ongoing, Progressing     Problem: Infection (Hemodialysis)  Goal: Absence of Infection Signs and Symptoms  Outcome: Ongoing, Progressing

## 2023-07-04 NOTE — PROGRESS NOTES
Infectious Diseases Progress Note  58-year-old male with past medical history of T-spine cord injury with paraplegia, diabetes, HTN, hepatitis-C, chronic right ankle wound/osteomyelitis, was on suppressive doxycycline, known to my service, seen by us initially at [facility] and has followed with us at the office for chronic osteomyelitis, is admitted to [facility] on 06/28/2023 presenting through the ED with complaints of pain and swelling to his right knee, apparently struck his knee.  He did also report prior remote right knee surgery.  He was evaluated and noted to have no fevers initially but a temperature of 100.2° today 6/29, no leukocytosis but with thrombocytosis of up to 531 today.  .2 and ESR >130.  He is anemic with low albumin.  Blood cultures have been negative.  CT scan of the right knee noted a 5 cm area of subcutaneous fluid collection in the prepatellar region.  There was aspiration in the ER with findings of purulent fluid and cultures showing group B Streptococcus.  He was seen by the orthopedic surgery team with findings of right prepatellar bursa abscess and is status post incision and drainage of right knee septic arthritis and right prepatellar bursa abscess today 6/29 with cultures pending.  He is on antibiotic coverage with vancomycin and ceftriaxone.    Subjective:  No new complaints, no fevers, doing about the same.  Lying in bed in no acute distress      Past Medical History:   Diagnosis Date    Arthritis     Chronic ulcer of ankle 05/26/2022    Frequent UTI 07/02/2019    Generalized anxiety disorder 05/26/2022    Neurogenic bladder 05/26/2022    Osteomyelitis 05/26/2022    Presence of suprapubic catheter 05/26/2022    Pure hypercholesterolemia 05/26/2022    Retention of urine, unspecified 08/09/2019    Spinal cord injury at T1-T6 level 04/20/2018     Past Surgical History:   Procedure Laterality Date    INCISION AND DRAINAGE, LOWER EXTREMITY  Right 6/29/2023    Procedure: INCISION AND DRAINAGE, LOWER EXTREMITY;  Surgeon: Prabhu Shen DO;  Location: [facility] OR;  Service: Orthopedics;  Laterality: Right;  supine bone foam wash stuff cultures    INSERTION OF INTRAMEDULLARY MARISA Right 8/10/2022    Procedure: INSERTION, INTRAMEDULLARY MARISA RIGHT TIBIA;  Surgeon: Jorge Orellana MD;  Location: [facility] OR;  Service: Orthopedics;  Laterality: Right;     Social History     Socioeconomic History    Marital status:    Tobacco Use    Smoking status: Every Day     Types: Cigars, Cigarettes    Smokeless tobacco: Never   Substance and Sexual Activity    Alcohol use: Yes     Alcohol/week: 2.0 standard drinks     Types: 2 Cans of beer per week    Drug use: Not Currently    Sexual activity: Never       ROS  Constitutional:  Positive for malaise/fatigue.   HENT: Negative.     Respiratory: Negative.     Gastrointestinal: Negative.    Genitourinary: Negative.    Musculoskeletal: Negative.         R knee pain   Neurological:  Positive for weakness.   Endo/Heme/Allergies: Negative.    Psychiatric/Behavioral: Negative.     All other Systems review done and negative.    Review of patient's allergies indicates:   Allergen Reactions    Baclofen Itching and Anxiety         Scheduled Meds:   amitriptyline  50 mg Oral QHS    amLODIPine  10 mg Oral Daily    atorvastatin  20 mg Oral Nightly    busPIRone  5 mg Oral BID    carvediloL  12.5 mg Oral BID    cefTRIAXone (ROCEPHIN) IVPB  2 g Intravenous Q24H    doxycycline  100 mg Oral Q12H    enoxaparin  30 mg Subcutaneous Daily    fenofibrate  145 mg Oral Daily    FLUoxetine  20 mg Oral Daily    furosemide (LASIX) injection  40 mg Intravenous BID    gabapentin  100 mg Oral BID    methocarbamoL  500 mg Oral TID    miconazole NITRATE 2 %   Topical (Top) BID    oxybutynin  5 mg Oral BID    pantoprazole  40 mg Intravenous Daily    polyethylene glycol  17 g Oral Daily    sodium bicarbonate  650 mg Oral BID    sodium citrate-citric acid 500-334  "mg/5 ml  30 mL Oral Once    traZODone  50 mg Oral Nightly     Continuous Infusions:   sodium bicarbonate drip       PRN Meds:acetaminophen, albuterol-ipratropium, cyclobenzaprine, LORazepam, melatonin, melatonin, morphine, ondansetron, oxyCODONE, oxyCODONE, sodium chloride 0.9%, zolpidem    Objective:  /82 (Patient Position: Lying)   Pulse 86   Temp 97 °F (36.1 °C) (Axillary)   Resp 16   Ht 5' 10" (1.778 m)   Wt 86.2 kg (190 lb)   SpO2 97%   BMI 27.26 kg/m²     Physical Exam:   Physical Exam  Vitals reviewed.   Constitutional:       General: He is not in acute distress.     Appearance: He is not toxic-appearing.   HENT:      Head: Normocephalic and atraumatic.   Cardiovascular:      Rate and Rhythm: Normal rate and regular rhythm.      Heart sounds: Normal heart sounds.   Pulmonary:      Effort: Pulmonary effort is normal. No respiratory distress.      Breath sounds: Normal breath sounds.   Abdominal:      General: Bowel sounds are normal. There is no distension.      Palpations: Abdomen is soft.      Tenderness: There is no abdominal tenderness.   Genitourinary:     Comments: Suprapubic catheter noted  Musculoskeletal:      Cervical back: Neck supple.      Comments: R knee bandaged from surgery   Skin:     Findings: No rash.      Comments: R ankle and left heel wound with no purulence.   Neurological:      Mental Status: He is alert and oriented to person, place, and time.      Comments: Paraplegic   Psychiatric:      Comments: Calm and cooperative     Imaging  Imaging Results              CT Knee W W/O Contrast Right (Final result)  Result time 06/28/23 18:37:42      Final result by Gustavo Cassidy MD (06/28/23 18:37:42)                   Impression:      Mildly limited assessment.  5 cm area of subcutaneous fluid in the prepatellar region may have thin peripheral enhancement.  Fluid collection is possible.    Subcutaneous edema in the calf.      Electronically signed by: Gustavo " Khanh  Date:    06/28/2023  Time:    18:37               Narrative:    EXAMINATION:  CT KNEE W W/O CONTRAST RIGHT    CLINICAL HISTORY:  possible infection;    TECHNIQUE:  CT imaging of the right knee before and after IV contrast.  Axial, coronal and sagittal reformatted images reviewed.   Dose length product is 585 mGycm. Automatic exposure control, adjustment of mA/kV or iterative reconstruction technique used to limit radiation dose.    COMPARISON:  Radiograph 06/28/2023    FINDINGS:  Assessment mildly limited by motion.  Joint alignments are maintained with degenerative changes at the knee.  Fixation hardware in the lower femur and tibia with remote proximal fibular fracture.  Areas of heterotopic ossification along the lower portion of the femur.  Small knee effusion.  Areas of subcutaneous edema anteriorly below the knee.  More focal area of fluid in the subcutaneous tissues of the prepatellar region measures up to 5 cm in transverse diameter and 5 cm in length.  There is image noise from the hardware, but this fluid may have thin areas of peripheral enhancement.  No tracking subcutaneous gas.                                       X-Ray Tibia Fibula 2 View Right (Final result)  Result time 06/28/23 18:14:06      Final result by Tracy Zamudio MD (06/28/23 18:14:06)                   Impression:      Posttraumatic and postsurgical changes at the femur and lower leg.  There is chronic deformity at the distal femur with heterogeneous sclerosis and lucency superimposed on background bony demineralization.  No old imaging of this region available for comparison.  This makes it difficult to evaluate for acute superimposed lytic change.    Postsurgical and posttraumatic change at the tibia and fibula with bony demineralization.  No appreciable acute osseous abnormality.      Electronically signed by: Tracy Zamudio  Date:    06/28/2023  Time:    18:14               Narrative:    EXAMINATION:  XR FEMUR 2 VIEW  RIGHT; XR TIBIA FIBULA 2 VIEW RIGHT    CLINICAL HISTORY:  possible infection;; infection;    TECHNIQUE:  AP and lateral views of the right femur were performed.    AP and lateral views of the right tibia and fibula were obtained.    COMPARISON:  Knee radiographs 06/28/2023, tibia and fibular radiographs 08/10/2022    FINDINGS:  There are postsurgical changes of ORIF at the distal femur with lateral plate and screws.  There are postsurgical changes of ORIF at the tibia with antegrade intramedullary raven and proximal and distal interlocking screws.  Hardware appears intact.  No acute fracture seen.  There are old, healed fracture deformities.    There is chronic remodeling at the distal femur with heterogeneous appearance of the marrow and cortex distally.  There are areas of lucency as well as sclerosis.  There is no imaging through the distal femur available for comparison to evaluate for acute lytic changes superimposed on chronic background posttraumatic and postsurgical change.  Older prior radiographs of the tibia and fibula do not adequately imaged the area.  The bones are demineralized.    There is soft tissue swelling at the knee.  There is soft tissue swelling at the ankle.                                       X-Ray Femur 2 View Right (Final result)  Result time 06/28/23 18:14:06      Final result by Tracy Zamudio MD (06/28/23 18:14:06)                   Impression:      Posttraumatic and postsurgical changes at the femur and lower leg.  There is chronic deformity at the distal femur with heterogeneous sclerosis and lucency superimposed on background bony demineralization.  No old imaging of this region available for comparison.  This makes it difficult to evaluate for acute superimposed lytic change.    Postsurgical and posttraumatic change at the tibia and fibula with bony demineralization.  No appreciable acute osseous abnormality.      Electronically signed by: Tracy  Robb  Date:    06/28/2023  Time:    18:14               Narrative:    EXAMINATION:  XR FEMUR 2 VIEW RIGHT; XR TIBIA FIBULA 2 VIEW RIGHT    CLINICAL HISTORY:  possible infection;; infection;    TECHNIQUE:  AP and lateral views of the right femur were performed.    AP and lateral views of the right tibia and fibula were obtained.    COMPARISON:  Knee radiographs 06/28/2023, tibia and fibular radiographs 08/10/2022    FINDINGS:  There are postsurgical changes of ORIF at the distal femur with lateral plate and screws.  There are postsurgical changes of ORIF at the tibia with antegrade intramedullary raven and proximal and distal interlocking screws.  Hardware appears intact.  No acute fracture seen.  There are old, healed fracture deformities.    There is chronic remodeling at the distal femur with heterogeneous appearance of the marrow and cortex distally.  There are areas of lucency as well as sclerosis.  There is no imaging through the distal femur available for comparison to evaluate for acute lytic changes superimposed on chronic background posttraumatic and postsurgical change.  Older prior radiographs of the tibia and fibula do not adequately imaged the area.  The bones are demineralized.    There is soft tissue swelling at the knee.  There is soft tissue swelling at the ankle.                                       X-Ray Knee 3 View Right (Final result)  Result time 06/28/23 14:18:03      Final result by Lanette Waller MD (06/28/23 14:18:03)                   Impression:      1. No acute bony abnormality.  2. Soft tissue swelling around the knee.      Electronically signed by: Lanette Waller  Date:    06/28/2023  Time:    14:18               Narrative:    EXAMINATION:  XR KNEE 3 VIEW RIGHT    CLINICAL HISTORY:  Effusion, right knee    COMPARISON:  None.    FINDINGS:  There has been prior internal fixation of the femur and tibia.  There are chronic changes of the bones with heterotopic ossification around  the knee.  There is no acute fracture identified.  There is soft tissue swelling around the knee.                                       Lab Review   Recent Results (from the past 24 hour(s))   Comprehensive Metabolic Panel    Collection Time: 07/03/23  5:31 AM   Result Value Ref Range    Sodium Level 125 (L) 136 - 145 mmol/L    Potassium Level 4.9 3.5 - 5.1 mmol/L    Chloride 93 (L) 98 - 107 mmol/L    Carbon Dioxide 20 (L) 22 - 29 mmol/L    Glucose Level 95 74 - 100 mg/dL    Blood Urea Nitrogen 35.9 (H) 8.4 - 25.7 mg/dL    Creatinine 6.69 (H) 0.73 - 1.18 mg/dL    Calcium Level Total 8.1 (L) 8.4 - 10.2 mg/dL    Protein Total 6.2 (L) 6.4 - 8.3 gm/dL    Albumin Level 2.2 (L) 3.5 - 5.0 g/dL    Globulin 4.0 (H) 2.4 - 3.5 gm/dL    Albumin/Globulin Ratio 0.6 (L) 1.1 - 2.0 ratio    Bilirubin Total 0.3 <=1.5 mg/dL    Alkaline Phosphatase 56 40 - 150 unit/L    Alanine Aminotransferase 8 0 - 55 unit/L    Aspartate Aminotransferase 16 5 - 34 unit/L    eGFR 9 mls/min/1.73/m2   CBC with Differential    Collection Time: 07/03/23  5:31 AM   Result Value Ref Range    WBC 6.17 4.50 - 11.50 x10(3)/mcL    RBC 3.54 (L) 4.70 - 6.10 x10(6)/mcL    Hgb 8.6 (L) 14.0 - 18.0 g/dL    Hct 27.2 (L) 42.0 - 52.0 %    MCV 76.8 (L) 80.0 - 94.0 fL    MCH 24.3 (L) 27.0 - 31.0 pg    MCHC 31.6 (L) 33.0 - 36.0 g/dL    RDW 17.2 (H) 11.5 - 17.0 %    Platelet 589 (H) 130 - 400 x10(3)/mcL    MPV 9.5 7.4 - 10.4 fL    Neut % 76.3 %    Lymph % 16.7 %    Mono % 6.0 %    Eos % 0.2 %    Basophil % 0.0 %    Lymph # 1.03 0.6 - 4.6 x10(3)/mcL    Neut # 4.71 2.1 - 9.2 x10(3)/mcL    Mono # 0.37 0.1 - 1.3 x10(3)/mcL    Eos # 0.01 0 - 0.9 x10(3)/mcL    Baso # 0.00 <=0.2 x10(3)/mcL    IG# 0.05 (H) 0 - 0.04 x10(3)/mcL    IG% 0.8 %    NRBC% 0.0 %   POCT glucose    Collection Time: 07/03/23  8:07 PM   Result Value Ref Range    POCT Glucose 89 70 - 110 mg/dL   RT Blood Gas    Collection Time: 07/03/23  8:13 PM   Result Value Ref Range    Sample Type Arterial Blood     Sample  site Right Radial Artery     Drawn by jada thacker     pH, Blood gas 7.370 7.350 - 7.450    pCO2, Blood gas 39.0 35.0 - 45.0 mmHg    pO2, Blood gas 81.0 80.0 - 100.0 mmHg    Sodium, Blood Gas 121 (L) 137 - 145 mmol/L    Potassium, Blood Gas 5.0 3.5 - 5.0 mmol/L    Calcium Level Ionized 1.07 (L) 1.12 - 1.23 mmol/L    TOC2, Blood gas 23.7 mmol/L    Base Excess, Blood gas -2.60 (L) -2.00 - 2.00 mmol/L    sO2, Blood gas 95.5 %    HCO3, Blood gas 22.5 22.0 - 26.0 mmol/L    THb, Blood gas 8.8 (L) 12 - 16 g/dL    O2 Hb, Blood Gas 95.3 94.0 - 97.0 %    CO Hgb 0.9 0.5 - 1.5 %    Met Hgb 0.7 0.4 - 1.5 %    Allens Test Yes     Oxygen Device, Blood gas Non Rebreather     LPM 15              Assessment/Plan:  1. SIRS with fevers  2. Group B Streptococcus Right knee prepatellar abscess  3. Group B Streptococcus Right knee septic arthritis  4.  Anemia/reactive thrombocytosis  5.  Paraplegia secondary to T-spine cord injury  6. History of hepatitis-C   7. Chronic right ankle wound/osteomyelitis  8.  Diabetes       -We will continue with ceftriaxone and chronic suppressive doxycycline.  Vancomycin has been discontinued  -No fevers noted with no leukocytosis and worse thrombocytosis, follow  -6/28 right knee abscess culture with Group B Streptococcus  -Seen by Orthopedic surgery team calm s/p I&D of R prepatellar bursa abscess and septic R knee with cultures yielding Group G Streptococcus  -6/28 blood cultures negative  -Multiple comorbidities, paraplegic with chronic pressure wounds, right ankle osteomyelitis with exposed bone on chronic suppressive doxycycline  -Plan for a long-term coverage with ceftriaxone, about a 6 week course following inflammatory markers as well as maintaining on chronic suppressive antibiotics with doxycycline  -We will continue surgical site care per Orthopedic surgery team  -We will continue wound care  -He is to continue management of his hepatitis-C as outpatient  -Renal impairment noted with worse  creatinine, nephrology is on board calm, will follow with labs a.m. 7/3 renal ultrasound results noted  -Discussed with patient and nursing staff.  Case management working on LTAC.  Disposition per primary team

## 2023-07-05 LAB
ALBUMIN SERPL-MCNC: 2.1 G/DL (ref 3.5–5)
ALBUMIN/GLOB SERPL: 0.5 RATIO (ref 1.1–2)
ALLENS TEST BLOOD GAS (OHS): YES
ALP SERPL-CCNC: 60 UNIT/L (ref 40–150)
ALT SERPL-CCNC: 7 UNIT/L (ref 0–55)
AST SERPL-CCNC: 20 UNIT/L (ref 5–34)
BASE EXCESS BLD CALC-SCNC: 6.5 MMOL/L
BASOPHILS # BLD AUTO: 0.02 X10(3)/MCL
BASOPHILS NFR BLD AUTO: 0.2 %
BILIRUBIN DIRECT+TOT PNL SERPL-MCNC: 0.4 MG/DL
BLOOD GAS SAMPLE TYPE (OHS): ABNORMAL
BNP BLD-MCNC: 198.9 PG/ML
BUN SERPL-MCNC: 39.8 MG/DL (ref 8.4–25.7)
CA-I BLD-SCNC: 1.05 MMOL/L (ref 1.12–1.23)
CALCIUM SERPL-MCNC: 8.1 MG/DL (ref 8.4–10.2)
CHLORIDE SERPL-SCNC: 93 MMOL/L (ref 98–107)
CO2 BLDA-SCNC: 33.4 MMOL/L
CO2 SERPL-SCNC: 20 MMOL/L (ref 22–29)
CREAT SERPL-MCNC: 7.07 MG/DL (ref 0.73–1.18)
DRAWN BY BLOOD GAS (OHS): ABNORMAL
EOSINOPHIL # BLD AUTO: 0.03 X10(3)/MCL (ref 0–0.9)
EOSINOPHIL NFR BLD AUTO: 0.3 %
ERYTHROCYTE [DISTWIDTH] IN BLOOD BY AUTOMATED COUNT: 18 % (ref 11.5–17)
FIO2 BLOOD GAS (OHS): 35 %
GFR SERPLBLD CREATININE-BSD FMLA CKD-EPI: 8 MLS/MIN/1.73/M2
GLOBULIN SER-MCNC: 3.9 GM/DL (ref 2.4–3.5)
GLUCOSE SERPL-MCNC: 78 MG/DL (ref 74–100)
HCO3 BLDA-SCNC: 31.9 MMOL/L
HCT VFR BLD AUTO: 27.6 % (ref 42–52)
HGB BLD-MCNC: 8.5 G/DL (ref 14–18)
IMM GRANULOCYTES # BLD AUTO: 0.1 X10(3)/MCL (ref 0–0.04)
IMM GRANULOCYTES NFR BLD AUTO: 1 %
LYMPHOCYTES # BLD AUTO: 1.28 X10(3)/MCL (ref 0.6–4.6)
LYMPHOCYTES NFR BLD AUTO: 12.2 %
MAGNESIUM SERPL-MCNC: 2 MG/DL (ref 1.6–2.6)
MCH RBC QN AUTO: 23.9 PG (ref 27–31)
MCHC RBC AUTO-ENTMCNC: 30.8 G/DL (ref 33–36)
MCV RBC AUTO: 77.7 FL (ref 80–94)
MECH RR (OHS): 10 B/MIN
MECH VT (OHS): 450 ML
MODE (OHS): ABNORMAL
MONOCYTES # BLD AUTO: 0.74 X10(3)/MCL (ref 0.1–1.3)
MONOCYTES NFR BLD AUTO: 7.1 %
NEUTROPHILS # BLD AUTO: 8.28 X10(3)/MCL (ref 2.1–9.2)
NEUTROPHILS NFR BLD AUTO: 79.2 %
NRBC BLD AUTO-RTO: 0 %
OXYGEN DEVICE BLOOD GAS (OHS): ABNORMAL
PCO2 BLDA: 48 MMHG (ref 35–45)
PH BLDA: 7.43 [PH] (ref 7.35–7.45)
PHOSPHATE SERPL-MCNC: 7.1 MG/DL (ref 2.3–4.7)
PLATELET # BLD AUTO: 530 X10(3)/MCL (ref 130–400)
PMV BLD AUTO: 10 FL (ref 7.4–10.4)
PO2 BLDA: 79 MMHG (ref 80–100)
POTASSIUM BLOOD GAS (OHS): 4.5 MMOL/L (ref 3.5–5)
POTASSIUM SERPL-SCNC: 5.3 MMOL/L (ref 3.5–5.1)
PROT SERPL-MCNC: 6 GM/DL (ref 6.4–8.3)
PS (OHS): 10 CMH2O
RBC # BLD AUTO: 3.55 X10(6)/MCL (ref 4.7–6.1)
SAMPLE SITE BLOOD GAS (OHS): ABNORMAL
SAO2 % BLDA: 96 %
SODIUM BLOOD GAS (OHS): 124 MMOL/L (ref 137–145)
SODIUM SERPL-SCNC: 128 MMOL/L (ref 136–145)
WBC # SPEC AUTO: 10.45 X10(3)/MCL (ref 4.5–11.5)

## 2023-07-05 PROCEDURE — 25000003 PHARM REV CODE 250: Performed by: INTERNAL MEDICINE

## 2023-07-05 PROCEDURE — 94003 VENT MGMT INPAT SUBQ DAY: CPT

## 2023-07-05 PROCEDURE — 99900035 HC TECH TIME PER 15 MIN (STAT)

## 2023-07-05 PROCEDURE — 63600175 PHARM REV CODE 636 W HCPCS: Performed by: STUDENT IN AN ORGANIZED HEALTH CARE EDUCATION/TRAINING PROGRAM

## 2023-07-05 PROCEDURE — 36600 WITHDRAWAL OF ARTERIAL BLOOD: CPT

## 2023-07-05 PROCEDURE — 85025 COMPLETE CBC W/AUTO DIFF WBC: CPT | Performed by: STUDENT IN AN ORGANIZED HEALTH CARE EDUCATION/TRAINING PROGRAM

## 2023-07-05 PROCEDURE — 84100 ASSAY OF PHOSPHORUS: CPT | Performed by: INTERNAL MEDICINE

## 2023-07-05 PROCEDURE — 99900031 HC PATIENT EDUCATION (STAT)

## 2023-07-05 PROCEDURE — 63600175 PHARM REV CODE 636 W HCPCS: Performed by: INTERNAL MEDICINE

## 2023-07-05 PROCEDURE — 83880 ASSAY OF NATRIURETIC PEPTIDE: CPT | Performed by: INTERNAL MEDICINE

## 2023-07-05 PROCEDURE — 94761 N-INVAS EAR/PLS OXIMETRY MLT: CPT

## 2023-07-05 PROCEDURE — 82803 BLOOD GASES ANY COMBINATION: CPT

## 2023-07-05 PROCEDURE — 99900026 HC AIRWAY MAINTENANCE (STAT)

## 2023-07-05 PROCEDURE — 27000221 HC OXYGEN, UP TO 24 HOURS

## 2023-07-05 PROCEDURE — 20000000 HC ICU ROOM

## 2023-07-05 PROCEDURE — 80100014 HC HEMODIALYSIS 1:1

## 2023-07-05 PROCEDURE — 83735 ASSAY OF MAGNESIUM: CPT | Performed by: INTERNAL MEDICINE

## 2023-07-05 PROCEDURE — 25000003 PHARM REV CODE 250: Performed by: STUDENT IN AN ORGANIZED HEALTH CARE EDUCATION/TRAINING PROGRAM

## 2023-07-05 PROCEDURE — 80053 COMPREHEN METABOLIC PANEL: CPT | Performed by: STUDENT IN AN ORGANIZED HEALTH CARE EDUCATION/TRAINING PROGRAM

## 2023-07-05 PROCEDURE — 27200966 HC CLOSED SUCTION SYSTEM

## 2023-07-05 RX ORDER — GUAIFENESIN 100 MG/5ML
400 SOLUTION ORAL EVERY 4 HOURS
Status: DISCONTINUED | OUTPATIENT
Start: 2023-07-05 | End: 2023-08-09 | Stop reason: HOSPADM

## 2023-07-05 RX ORDER — SODIUM CHLORIDE 9 MG/ML
INJECTION, SOLUTION INTRAVENOUS ONCE
Status: CANCELLED | OUTPATIENT
Start: 2023-07-05 | End: 2023-07-05

## 2023-07-05 RX ORDER — SODIUM CHLORIDE 9 MG/ML
INJECTION, SOLUTION INTRAVENOUS
Status: CANCELLED | OUTPATIENT
Start: 2023-07-05

## 2023-07-05 RX ADMIN — PROPOFOL 0 MCG/KG/MIN: 10 INJECTION, EMULSION INTRAVENOUS at 04:07

## 2023-07-05 RX ADMIN — MEROPENEM 1 G: 1 INJECTION, POWDER, FOR SOLUTION INTRAVENOUS at 09:07

## 2023-07-05 RX ADMIN — MEROPENEM 1 G: 1 INJECTION, POWDER, FOR SOLUTION INTRAVENOUS at 08:07

## 2023-07-05 RX ADMIN — FENOFIBRATE 48 MG: 48 TABLET, FILM COATED ORAL at 09:07

## 2023-07-05 RX ADMIN — PROPOFOL 40 MCG/KG/MIN: 10 INJECTION, EMULSION INTRAVENOUS at 10:07

## 2023-07-05 RX ADMIN — AMLODIPINE BESYLATE 5 MG: 5 TABLET ORAL at 09:07

## 2023-07-05 RX ADMIN — SODIUM BICARBONATE 650 MG TABLET 650 MG: at 08:07

## 2023-07-05 RX ADMIN — SODIUM BICARBONATE 650 MG TABLET 650 MG: at 09:07

## 2023-07-05 RX ADMIN — CARVEDILOL 6.25 MG: 3.12 TABLET, FILM COATED ORAL at 08:07

## 2023-07-05 RX ADMIN — OXYBUTYNIN CHLORIDE 5 MG: 5 TABLET ORAL at 09:07

## 2023-07-05 RX ADMIN — MICONAZOLE NITRATE 2 % TOPICAL POWDER: at 09:07

## 2023-07-05 RX ADMIN — GUAIFENESIN 400 MG: 200 SOLUTION ORAL at 06:07

## 2023-07-05 RX ADMIN — PROPOFOL 50 MCG/KG/MIN: 10 INJECTION, EMULSION INTRAVENOUS at 08:07

## 2023-07-05 RX ADMIN — ATORVASTATIN CALCIUM 20 MG: 10 TABLET, FILM COATED ORAL at 08:07

## 2023-07-05 RX ADMIN — PROPOFOL 35 MCG/KG/MIN: 10 INJECTION, EMULSION INTRAVENOUS at 03:07

## 2023-07-05 RX ADMIN — CARVEDILOL 6.25 MG: 3.12 TABLET, FILM COATED ORAL at 09:07

## 2023-07-05 RX ADMIN — DOXYCYCLINE HYCLATE 100 MG: 100 TABLET, COATED ORAL at 08:07

## 2023-07-05 RX ADMIN — MUPIROCIN: 20 OINTMENT TOPICAL at 09:07

## 2023-07-05 RX ADMIN — MUPIROCIN: 20 OINTMENT TOPICAL at 08:07

## 2023-07-05 RX ADMIN — DOXYCYCLINE HYCLATE 100 MG: 100 TABLET, COATED ORAL at 09:07

## 2023-07-05 RX ADMIN — FUROSEMIDE 40 MG: 10 INJECTION, SOLUTION INTRAMUSCULAR; INTRAVENOUS at 09:07

## 2023-07-05 RX ADMIN — FUROSEMIDE 40 MG: 10 INJECTION, SOLUTION INTRAMUSCULAR; INTRAVENOUS at 06:07

## 2023-07-05 RX ADMIN — OXYBUTYNIN CHLORIDE 5 MG: 5 TABLET ORAL at 08:07

## 2023-07-05 RX ADMIN — GUAIFENESIN 400 MG: 200 SOLUTION ORAL at 09:07

## 2023-07-05 RX ADMIN — ENOXAPARIN SODIUM 30 MG: 30 INJECTION SUBCUTANEOUS at 04:07

## 2023-07-05 RX ADMIN — FAMOTIDINE 20 MG: 10 INJECTION, SOLUTION INTRAVENOUS at 08:07

## 2023-07-05 NOTE — PROGRESS NOTES
Infectious Diseases Progress Note  58-year-old male with past medical history of T-spine cord injury with paraplegia, diabetes, HTN, hepatitis-C, chronic right ankle wound/osteomyelitis, was on suppressive doxycycline, known to my service, seen by us initially at [facility] and has followed with us at the office for chronic osteomyelitis, is admitted to [facility] on 06/28/2023 presenting through the ED with complaints of pain and swelling to his right knee, apparently struck his knee.  He did also report prior remote right knee surgery.  He was evaluated and noted to have no fevers initially but a temperature of 100.2° today 6/29, no leukocytosis but with thrombocytosis of up to 531 today.  .2 and ESR >130.  He is anemic with low albumin.  Blood cultures have been negative.  CT scan of the right knee noted a 5 cm area of subcutaneous fluid collection in the prepatellar region.  There was aspiration in the ER with findings of purulent fluid and cultures showing group B Streptococcus.  He was seen by the orthopedic surgery team with findings of right prepatellar bursa abscess and is status post incision and drainage of right knee septic arthritis and right prepatellar bursa abscess today 6/29 with cultures pending.    He is on Merrem.    Subjective:  Interval history noted, transferred to the ICU today due to acute respiratory failure, orally intubated on ventilator.  Started on hemodialysis today.  No fevers, isolated low temperature of 94.6 noted, in no acute distress.      Past Medical History:   Diagnosis Date    Arthritis     Chronic ulcer of ankle 05/26/2022    Frequent UTI 07/02/2019    Generalized anxiety disorder 05/26/2022    Neurogenic bladder 05/26/2022    Osteomyelitis 05/26/2022    Presence of suprapubic catheter 05/26/2022    Pure hypercholesterolemia 05/26/2022    Retention of urine, unspecified 08/09/2019    Spinal cord injury at T1-T6 level 04/20/2018  "    Past Surgical History:   Procedure Laterality Date    INCISION AND DRAINAGE, LOWER EXTREMITY Right 6/29/2023    Procedure: INCISION AND DRAINAGE, LOWER EXTREMITY;  Surgeon: Prabhu Shen DO;  Location: [facility] OR;  Service: Orthopedics;  Laterality: Right;  supine bone foam wash stuff cultures    INSERTION OF INTRAMEDULLARY MARISA Right 8/10/2022    Procedure: INSERTION, INTRAMEDULLARY MARISA RIGHT TIBIA;  Surgeon: Jorge Orellana MD;  Location: [facility] OR;  Service: Orthopedics;  Laterality: Right;     Social History     Socioeconomic History    Marital status:    Tobacco Use    Smoking status: Every Day     Types: Cigars, Cigarettes    Smokeless tobacco: Never   Substance and Sexual Activity    Alcohol use: Yes     Alcohol/week: 2.0 standard drinks     Types: 2 Cans of beer per week    Drug use: Not Currently    Sexual activity: Never       Review of Systems   Unable to perform ROS: Intubated       Review of patient's allergies indicates:   Allergen Reactions    Baclofen Itching and Anxiety         Scheduled Meds:   [START ON 7/5/2023] amLODIPine  5 mg Oral Daily    atorvastatin  20 mg Oral Nightly    carvediloL  6.25 mg Oral BID    doxycycline  100 mg Oral Q12H    enoxaparin  30 mg Subcutaneous Daily    famotidine (PF)  20 mg Intravenous Daily    [START ON 7/5/2023] fenofibrate  48 mg Oral Daily    furosemide (LASIX) injection  40 mg Intravenous BID    meropenem (MERREM) IVPB  1 g Intravenous Q12H    miconazole NITRATE 2 %   Topical (Top) BID    mupirocin   Nasal BID    oxybutynin  5 mg Oral BID    sodium bicarbonate  650 mg Oral BID    sodium citrate-citric acid 500-334 mg/5 ml  30 mL Oral Once     Continuous Infusions:   NORepinephrine bitartrate-D5W 0.02 mcg/kg/min (07/04/23 1315)    propofoL 35 mcg/kg/min (07/04/23 2254)     PRN Meds:acetaminophen, sodium chloride 0.9%, sodium chloride 0.9%    Objective:  /63   Pulse 63   Temp 97.4 °F (36.3 °C) (Oral)   Resp 20   Ht 5' 9.69" (1.77 m)   Wt 86.2 kg " (190 lb)   SpO2 100%   BMI 27.51 kg/m²     Physical Exam:   Physical Exam  Vitals reviewed.   Constitutional:       General: He is not in acute distress.     Appearance: He is ill-appearing.   HENT:      Head: Normocephalic and atraumatic.      Mouth/Throat:      Comments: ET tube in place  Cardiovascular:      Rate and Rhythm: Normal rate and regular rhythm.      Heart sounds: Normal heart sounds.   Pulmonary:      Effort: No respiratory distress.      Comments: Coarse breath sounds on ventilator  Abdominal:      General: Bowel sounds are normal. There is no distension.      Palpations: Abdomen is soft.      Tenderness: There is no abdominal tenderness.   Genitourinary:     Comments: Suprapubic catheter  Musculoskeletal:      Cervical back: Neck supple.      Comments: Some contracture of lower extremities   Skin:     Findings: No rash.      Comments: Right knee with wound VAC. Right ankle and left heel wounds dressed   Neurological:      Comments: Unable to fully evaluate, sedated on ventilator       Imaging  Imaging Results              CT Knee W W/O Contrast Right (Final result)  Result time 06/28/23 18:37:42      Final result by Gustavo Cassidy MD (06/28/23 18:37:42)                   Impression:      Mildly limited assessment.  5 cm area of subcutaneous fluid in the prepatellar region may have thin peripheral enhancement.  Fluid collection is possible.    Subcutaneous edema in the calf.      Electronically signed by: Gustavo Cassidy  Date:    06/28/2023  Time:    18:37               Narrative:    EXAMINATION:  CT KNEE W W/O CONTRAST RIGHT    CLINICAL HISTORY:  possible infection;    TECHNIQUE:  CT imaging of the right knee before and after IV contrast.  Axial, coronal and sagittal reformatted images reviewed.   Dose length product is 585 mGycm. Automatic exposure control, adjustment of mA/kV or iterative reconstruction technique used to limit radiation dose.    COMPARISON:  Radiograph  06/28/2023    FINDINGS:  Assessment mildly limited by motion.  Joint alignments are maintained with degenerative changes at the knee.  Fixation hardware in the lower femur and tibia with remote proximal fibular fracture.  Areas of heterotopic ossification along the lower portion of the femur.  Small knee effusion.  Areas of subcutaneous edema anteriorly below the knee.  More focal area of fluid in the subcutaneous tissues of the prepatellar region measures up to 5 cm in transverse diameter and 5 cm in length.  There is image noise from the hardware, but this fluid may have thin areas of peripheral enhancement.  No tracking subcutaneous gas.                                       X-Ray Tibia Fibula 2 View Right (Final result)  Result time 06/28/23 18:14:06      Final result by Tracy Zamudio MD (06/28/23 18:14:06)                   Impression:      Posttraumatic and postsurgical changes at the femur and lower leg.  There is chronic deformity at the distal femur with heterogeneous sclerosis and lucency superimposed on background bony demineralization.  No old imaging of this region available for comparison.  This makes it difficult to evaluate for acute superimposed lytic change.    Postsurgical and posttraumatic change at the tibia and fibula with bony demineralization.  No appreciable acute osseous abnormality.      Electronically signed by: Tracy Zamudio  Date:    06/28/2023  Time:    18:14               Narrative:    EXAMINATION:  XR FEMUR 2 VIEW RIGHT; XR TIBIA FIBULA 2 VIEW RIGHT    CLINICAL HISTORY:  possible infection;; infection;    TECHNIQUE:  AP and lateral views of the right femur were performed.    AP and lateral views of the right tibia and fibula were obtained.    COMPARISON:  Knee radiographs 06/28/2023, tibia and fibular radiographs 08/10/2022    FINDINGS:  There are postsurgical changes of ORIF at the distal femur with lateral plate and screws.  There are postsurgical changes of ORIF at the  tibia with antegrade intramedullary raven and proximal and distal interlocking screws.  Hardware appears intact.  No acute fracture seen.  There are old, healed fracture deformities.    There is chronic remodeling at the distal femur with heterogeneous appearance of the marrow and cortex distally.  There are areas of lucency as well as sclerosis.  There is no imaging through the distal femur available for comparison to evaluate for acute lytic changes superimposed on chronic background posttraumatic and postsurgical change.  Older prior radiographs of the tibia and fibula do not adequately imaged the area.  The bones are demineralized.    There is soft tissue swelling at the knee.  There is soft tissue swelling at the ankle.                                       X-Ray Femur 2 View Right (Final result)  Result time 06/28/23 18:14:06      Final result by Tracy Zamudio MD (06/28/23 18:14:06)                   Impression:      Posttraumatic and postsurgical changes at the femur and lower leg.  There is chronic deformity at the distal femur with heterogeneous sclerosis and lucency superimposed on background bony demineralization.  No old imaging of this region available for comparison.  This makes it difficult to evaluate for acute superimposed lytic change.    Postsurgical and posttraumatic change at the tibia and fibula with bony demineralization.  No appreciable acute osseous abnormality.      Electronically signed by: Tracy Zamudio  Date:    06/28/2023  Time:    18:14               Narrative:    EXAMINATION:  XR FEMUR 2 VIEW RIGHT; XR TIBIA FIBULA 2 VIEW RIGHT    CLINICAL HISTORY:  possible infection;; infection;    TECHNIQUE:  AP and lateral views of the right femur were performed.    AP and lateral views of the right tibia and fibula were obtained.    COMPARISON:  Knee radiographs 06/28/2023, tibia and fibular radiographs 08/10/2022    FINDINGS:  There are postsurgical changes of ORIF at the distal femur  with lateral plate and screws.  There are postsurgical changes of ORIF at the tibia with antegrade intramedullary raven and proximal and distal interlocking screws.  Hardware appears intact.  No acute fracture seen.  There are old, healed fracture deformities.    There is chronic remodeling at the distal femur with heterogeneous appearance of the marrow and cortex distally.  There are areas of lucency as well as sclerosis.  There is no imaging through the distal femur available for comparison to evaluate for acute lytic changes superimposed on chronic background posttraumatic and postsurgical change.  Older prior radiographs of the tibia and fibula do not adequately imaged the area.  The bones are demineralized.    There is soft tissue swelling at the knee.  There is soft tissue swelling at the ankle.                                       X-Ray Knee 3 View Right (Final result)  Result time 06/28/23 14:18:03      Final result by Lanette Waller MD (06/28/23 14:18:03)                   Impression:      1. No acute bony abnormality.  2. Soft tissue swelling around the knee.      Electronically signed by: Lanette Waller  Date:    06/28/2023  Time:    14:18               Narrative:    EXAMINATION:  XR KNEE 3 VIEW RIGHT    CLINICAL HISTORY:  Effusion, right knee    COMPARISON:  None.    FINDINGS:  There has been prior internal fixation of the femur and tibia.  There are chronic changes of the bones with heterotopic ossification around the knee.  There is no acute fracture identified.  There is soft tissue swelling around the knee.                                       Lab Review   Recent Results (from the past 24 hour(s))   CK    Collection Time: 07/04/23  5:08 AM   Result Value Ref Range    Creatine Kinase 80 30 - 200 U/L   Lactate Dehydrogenase    Collection Time: 07/04/23  5:08 AM   Result Value Ref Range    Lactate Dehydrogenase 347 (H) 125 - 220 U/L   Uric Acid    Collection Time: 07/04/23  5:08 AM   Result Value  Ref Range    Uric Acid 8.5 (H) 3.5 - 7.2 mg/dL   BNP    Collection Time: 07/04/23  5:08 AM   Result Value Ref Range    Natriuretic Peptide 593.2 (H) <=100.0 pg/mL   Magnesium    Collection Time: 07/04/23  5:08 AM   Result Value Ref Range    Magnesium Level 1.80 1.60 - 2.60 mg/dL   PTH, Intact    Collection Time: 07/04/23  5:08 AM   Result Value Ref Range    Parathyroid Hormone Intact 377.3 (H) 8.7 - 77.0 pg/mL   Phosphorus    Collection Time: 07/04/23  5:08 AM   Result Value Ref Range    Phosphorus Level 7.3 (H) 2.3 - 4.7 mg/dL   Comprehensive Metabolic Panel    Collection Time: 07/04/23  5:08 AM   Result Value Ref Range    Sodium Level 123 (L) 136 - 145 mmol/L    Potassium Level 6.0 (H) 3.5 - 5.1 mmol/L    Chloride 91 (L) 98 - 107 mmol/L    Carbon Dioxide 24 22 - 29 mmol/L    Glucose Level 110 (H) 74 - 100 mg/dL    Blood Urea Nitrogen 44.6 (H) 8.4 - 25.7 mg/dL    Creatinine 8.12 (H) 0.73 - 1.18 mg/dL    Calcium Level Total 8.1 (L) 8.4 - 10.2 mg/dL    Protein Total 6.4 6.4 - 8.3 gm/dL    Albumin Level 2.0 (L) 3.5 - 5.0 g/dL    Globulin 4.4 (H) 2.4 - 3.5 gm/dL    Albumin/Globulin Ratio 0.5 (L) 1.1 - 2.0 ratio    Bilirubin Total 0.4 <=1.5 mg/dL    Alkaline Phosphatase 59 40 - 150 unit/L    Alanine Aminotransferase 7 0 - 55 unit/L    Aspartate Aminotransferase 25 5 - 34 unit/L    eGFR 7 mls/min/1.73/m2   CBC with Differential    Collection Time: 07/04/23  5:08 AM   Result Value Ref Range    WBC 12.22 (H) 4.50 - 11.50 x10(3)/mcL    RBC 3.41 (L) 4.70 - 6.10 x10(6)/mcL    Hgb 8.3 (L) 14.0 - 18.0 g/dL    Hct 26.0 (L) 42.0 - 52.0 %    MCV 76.2 (L) 80.0 - 94.0 fL    MCH 24.3 (L) 27.0 - 31.0 pg    MCHC 31.9 (L) 33.0 - 36.0 g/dL    RDW 17.2 (H) 11.5 - 17.0 %    Platelet 605 (H) 130 - 400 x10(3)/mcL    MPV 9.6 7.4 - 10.4 fL    Neut % 87.6 %    Lymph % 7.1 %    Mono % 4.0 %    Eos % 0.1 %    Basophil % 0.1 %    Lymph # 0.87 0.6 - 4.6 x10(3)/mcL    Neut # 10.70 (H) 2.1 - 9.2 x10(3)/mcL    Mono # 0.49 0.1 - 1.3 x10(3)/mcL    Eos #  0.01 0 - 0.9 x10(3)/mcL    Baso # 0.01 <=0.2 x10(3)/mcL    IG# 0.14 (H) 0 - 0.04 x10(3)/mcL    IG% 1.1 %    NRBC% 0.0 %   RT Blood Gas    Collection Time: 07/04/23  6:10 AM   Result Value Ref Range    Sample Type Arterial Blood     Sample site Left Radial Artery     Drawn by sd rrt     pH, Blood gas 7.360 7.350 - 7.450    pCO2, Blood gas 46.0 (H) 35.0 - 45.0 mmHg    pO2, Blood gas 214.0 (H) 80.0 - 100.0 mmHg    Sodium, Blood Gas 123 (L) 137 - 145 mmol/L    Potassium, Blood Gas 5.5 (H) 3.5 - 5.0 mmol/L    Calcium Level Ionized 1.04 (L) 1.12 - 1.23 mmol/L    TOC2, Blood gas 27.4 mmol/L    Base Excess, Blood gas 0.20 mmol/L    sO2, Blood gas 100.0 %    HCO3, Blood gas 26.0 >=15.0 mmol/L    Allens Test Yes     MODE SIMV     Oxygen Device, Blood gas Ventilator     FIO2, Blood gas 100 %    Mech Vt 500 ml    Mech RR 20 b/min    PEEP 10.0 cmH2O    PS 10.0 cmH2O   Respiratory Culture    Collection Time: 07/04/23  7:53 AM    Specimen: Sputum, Induced   Result Value Ref Range    GRAM STAIN Quality 3+     GRAM STAIN Rare Gram positive cocci    MRSA PCR    Collection Time: 07/04/23  7:53 AM   Result Value Ref Range    MRSA PCR SCRN ([facility]) Not Detected Not Detected   Hepatitis B Surface Antigen    Collection Time: 07/04/23 10:39 AM   Result Value Ref Range    Hepatitis B Surface Antigen Nonreactive Nonreactive   Echo    Collection Time: 07/04/23 11:51 AM   Result Value Ref Range    BSA 2.06 m2    TDI SEPTAL 0.07 m/s    LV LATERAL E/E' RATIO 7.07 m/s    LV SEPTAL E/E' RATIO 14.14 m/s    Right Atrial Pressure (from IVC) 8 mmHg    EF 55 %    Left Ventricular Outflow Tract Mean Velocity 0.47 cm/s    Left Ventricular Outflow Tract Mean Gradient 1.00 mmHg    TDI LATERAL 0.14 m/s    LVIDd 4.18 3.5 - 6.0 cm    IVS 0.99 0.6 - 1.1 cm    Posterior Wall 1.04 0.6 - 1.1 cm    LVIDs 2.86 2.1 - 4.0 cm    FS 32 28 - 44 %    LV mass 139.09 g    LA size 3.50 cm    RVDD 3.70 cm    TAPSE 1.75 cm    Left Ventricle Relative Wall Thickness 0.50 cm    AV  mean gradient 2 mmHg    AV valve area 2.21 cm2    AV Velocity Ratio 0.79     AV index (prosthetic) 0.70     MV mean gradient 1 mmHg    MV valve area by continuity eq 2.23 cm2    E/A ratio 1.94     Mean e' 0.11 m/s    E wave deceleration time 151.00 msec    LVOT diameter 2.00 cm    LVOT area 3.1 cm2    LVOT peak jan 0.77 m/s    LVOT peak VTI 14.50 cm    Ao peak jan 0.98 m/s    Ao VTI 20.6 cm    LVOT stroke volume 45.53 cm3    AV peak gradient 4 mmHg    MV peak gradient 3 mmHg    TV rest pulmonary artery pressure 50 mmHg    E/E' ratio 9.43 m/s    MV Peak E Jan 0.99 m/s    TR Max Jan 3.24 m/s    MV VTI 20.4 cm    MV Peak A Jan 0.51 m/s    LV Systolic Volume 31.10 mL    LV Systolic Volume Index 15.2 mL/m2    LV Diastolic Volume 77.70 mL    LV Diastolic Volume Index 38.09 mL/m2    LV Mass Index 68 g/m2    RA Major Axis 4.50 cm    Triscuspid Valve Regurgitation Peak Gradient 42 mmHg    LA Volume Index (Mod) 32.5 mL/m2    LA volume (mod) 66.20 cm3    RA Width 4.04 cm    De La Cruz's Biplane MOD Ejection Fraction 6 %             Assessment/Plan:  1. SIRS with fevers  2. Group B Streptococcus Right knee prepatellar abscess  3. Group B Streptococcus Right knee septic arthritis  4.  Anemia/reactive thrombocytosis  5.  Paraplegia secondary to T-spine cord injury  6. History of hepatitis-C   7. Chronic right ankle wound/osteomyelitis  8.  Diabetes    9.  Acute kidney injury  10. Acute hypoxic respiratory failure  11. Pulmonary edema with pleural effusions/ Pneumonitis      -Merrem initiated by primary team and ceftriaxone discontinued, maintaining on chronic suppressive doxycycline    -No fevers noted, isolated temp of 94.6° noted and now with slight leukocytosis and worse thrombocytosis, follow  -7/4 CT scan of the abdomen and pelvis and 7/3 chest x-ray results noted, likely dealing with pulmonary edema with pleural effusions and possibly superimposed infectious pneumonitis. We will get procalcitonin level for further  evaluation  -6/28 right knee abscess culture with Group B Streptococcus  -Seen by Orthopedic surgery team calm s/p I&D of R prepatellar bursa abscess and septic R knee with cultures yielding Group G Streptococcus  -6/28 blood cultures negative  -Multiple comorbidities, paraplegic with chronic pressure wounds, right ankle osteomyelitis with exposed bone on chronic suppressive doxycycline  -Plan for a long-term antibiotic coverage, about a 6 week course for GBS septic arthritis, following inflammatory markers as well as maintaining on chronic suppressive antibiotics with doxycycline  -We will continue surgical site care per Orthopedic surgery team  -We will continue wound care  -He is to continue management of his hepatitis-C as outpatient  -Renal impairment noted with worse creatinine, Nephrology on board, started hemodialysis today 7/4  -7/3 Renal ultrasound results noted  -Continue ventilator management and ICU support per intensivist  -Discussed with nursing staff.

## 2023-07-05 NOTE — PROGRESS NOTES
Progress Note  Nephrology    Admit Date: 6/28/2023   LOS: 7 days     SUBJECTIVE:     Follow-up For:  ANTON / ATN    Interval History:  57 Y/O male with history of paraplegia , neurogenic Bladder , S/P supra pubic cath chronic and H/O Right knee  osteomyelitis and infection , admitted to hospital for Right knee , infection with Strep Agalactia   Started on antibiotics but  had decreased urine out put with increasing BUN and CR  Started on hemodialysis yesterday for 2 hrs and 1 liter fluid removal   Pt had dialysis again today and 2 liter fluid removed   Tolerated dialysis   Still on vent     Review of Systems:  Constitutional: No fever or chills  Respiratory: No cough or shortness of breath  Cardiovascular: No chest pain or palpitations  Gastrointestinal: No nausea or vomiting  Neurological: No confusion or weakness  Has supra pubic catheter which is leaking     OBJECTIVE:     Vital Signs Range (Last 24H):  Vitals:    07/05/23 1115   BP: (!) 140/75   Pulse: 74   Resp: 20   Temp:        Temp:  [95.6 °F (35.3 °C)-97.6 °F (36.4 °C)]   Pulse:  [60-77]   Resp:  [15-25]   BP: ()/(54-80)   SpO2:  [92 %-100 %]     I & O (Last 24H):  Intake/Output Summary (Last 24 hours) at 7/5/2023 1507  Last data filed at 7/5/2023 1107  Gross per 24 hour   Intake 60 ml   Output 2020 ml   Net -1960 ml       Physical Exam:  Intubated , on vent , sedated   Head   normal   Neck   supple   Chest   symmetric   Lungs   clear  , on vent  Heart    RRR  Abdomen   soft , non tender   Ext  no edema  paraplegia +    Laboratory Data:    Recent Labs   Lab 07/05/23  0138   *   K 5.3*   CO2 20*   BUN 39.8*   CREATININE 7.07*   GLUCOSE 78   CALCIUM 8.1*   PHOS 7.1*     Lab Results   Component Value Date    .3 (H) 07/04/2023    CALCIUM 8.1 (L) 07/05/2023    CAION 1.05 (L) 07/05/2023    PHOS 7.1 (H) 07/05/2023     Lab Results   Component Value Date    IRON 35 (L) 12/30/2021    TIBC 284 12/30/2021    FERRITIN 24.99 12/30/2021        Medications:  Medication list was reviewed and changes noted under Assessment/Plan.    Diagnostic Results:    Reviewed most recent CT/US/Echo/MRI    ASSESSMENT/PLAN:   1   ANTON , ATN, dialysis today done  will do tomorrow again   2   hyponatremia due to fluid overload  better   3    paraplegia  4   Right knee infection   5   neurogenic bladder   6   anemia   7   HTN  8   DM 2  9   A fib   10   SHPT  11   CKD   stage unspecified       Plan   dialysis in AM            Labs in AM

## 2023-07-05 NOTE — PROGRESS NOTES
[facility] - 7 North ICU  Pulmonary Critical Care Note    Patient Name: Bianca Khan  MRN: [id number]  Admission Date: 6/28/2023  Hospital Length of Stay: 7 days  Code Status: Full Code  Attending Provider: Khris Treadwell Jr., MD, *  Primary Care Provider: Areli Vargas PA-C     Subjective:     HPI:   Bianca Khan is a 58 y.o. male with PMH of paraplegia 2/2 spinal cord injury (T1-T6), neurogenic bladder with suprapubic catheter, chronic right ankle osteomyelitis, DM II, HTN, who presented to the ED on 6/28/2023 with CC of right knee pain. Per chart review, CT of right knee revealed 5 cm area of subcutaneous fluid in prepatellar region which was aspirated in the ED; he was taken to the OR on 6/29/2023 by orthopedic surgery team and was found to have prepatellar bursa infection and septic arthritis. Wound culture 6/29/2023 positive for strep agalactiae. Orthopedic surgery team recommended right-sided above-knee amputation d/t chronically infected hardware in right lower extremity. On 7/4/2023, a RRT was called d/t acute respiratory distress that was not improving despite being placed on vapotherm at 35 L/min with FiO2 100%. At the time of examination, patient's initial GCS was 10 but progressively reduced to a GCS of 6. Shared decision with patient's wife was made to intubate patient for airway protection though ABGs were within normal limits. He is admitted to the ICU for close monitoring and further medical management.       Hospital Course/Significant events:  6/29/2023 - I&D of right knee  7/4/2023 - Admitted to ICU, intubated for airway protection d/t altered mental status. Trialysis catheter placed and HD began for worsening renal function.     24 Hour Interval History:  No acute events overnight. Labs this AM: decreased WBC; lytes resolving; Scr, BUN, BNP down-trending. I/O: 20 mL urine output past 24 hours, net negative -751 L    Past Medical History:   Diagnosis Date    Arthritis      Chronic ulcer of ankle 05/26/2022    Frequent UTI 07/02/2019    Generalized anxiety disorder 05/26/2022    Neurogenic bladder 05/26/2022    Osteomyelitis 05/26/2022    Presence of suprapubic catheter 05/26/2022    Pure hypercholesterolemia 05/26/2022    Retention of urine, unspecified 08/09/2019    Spinal cord injury at T1-T6 level 04/20/2018       Past Surgical History:   Procedure Laterality Date    INCISION AND DRAINAGE, LOWER EXTREMITY Right 6/29/2023    Procedure: INCISION AND DRAINAGE, LOWER EXTREMITY;  Surgeon: Prabhu hSen DO;  Location: [facility];  Service: Orthopedics;  Laterality: Right;  supine bone foam wash stuff cultures    INSERTION OF INTRAMEDULLARY MARISA Right 8/10/2022    Procedure: INSERTION, INTRAMEDULLARY MARISA RIGHT TIBIA;  Surgeon: Jorge Orellana MD;  Location: [facility];  Service: Orthopedics;  Laterality: Right;       Social History     Socioeconomic History    Marital status:    Tobacco Use    Smoking status: Every Day     Types: Cigars, Cigarettes    Smokeless tobacco: Never   Substance and Sexual Activity    Alcohol use: Yes     Alcohol/week: 2.0 standard drinks     Types: 2 Cans of beer per week    Drug use: Not Currently    Sexual activity: Never       Current Outpatient Medications   Medication Instructions    amitriptyline (ELAVIL) 50 mg, Oral, Nightly    amLODIPine (NORVASC) 10 MG tablet 1 tablet    atorvastatin (LIPITOR) 20 mg, Oral, Nightly    busPIRone (BUSPAR) 5 MG Tab 1 tablet    carvediloL (COREG) 12.5 mg, Oral    cyclobenzaprine (FLEXERIL) 10 mg, Oral, 2 times daily PRN    doxycycline (VIBRAMYCIN) 100 mg, Oral, Daily    fenofibrate 160 mg, Oral    FLUoxetine 20 mg, Oral    gabapentin (NEURONTIN) 300 MG capsule 1 capsule    lisinopriL 10 mg, Oral, Daily    LORazepam (ATIVAN) 1 mg, Oral, 2 times daily    melatonin (MELATIN) 3 mg tablet TAKE TWO TABLETS BY MOUTH EVERY NIGHT AT BEDTIME AS NEEDED FOR INSOMNIA.    meloxicam (MOBIC) 15 mg, Oral, Daily    metFORMIN (GLUCOPHAGE) 500  MG tablet SMARTSI Tablet(s) By Mouth Morning-Evening    oxybutynin (DITROPAN) 5 mg, Oral, 2 times daily    oxyCODONE-acetaminophen (PERCOCET)  mg per tablet 1 tablet, Oral, Every 6 hours PRN    pantoprazole (PROTONIX) 40 MG tablet 1 tablet    temazepam (RESTORIL) 30 mg, Oral, Nightly    traZODone (DESYREL) 50 mg, Oral, Nightly    XARELTO 20 mg Tab SMARTSI Tablet(s) By Mouth Every Evening     Current Inpatient Medications   amLODIPine  5 mg Oral Daily    atorvastatin  20 mg Oral Nightly    carvediloL  6.25 mg Oral BID    doxycycline  100 mg Oral Q12H    enoxaparin  30 mg Subcutaneous Daily    famotidine (PF)  20 mg Intravenous Daily    fenofibrate  48 mg Oral Daily    furosemide (LASIX) injection  40 mg Intravenous BID    meropenem (MERREM) IVPB  1 g Intravenous Q12H    miconazole NITRATE 2 %   Topical (Top) BID    mupirocin   Nasal BID    oxybutynin  5 mg Oral BID    sodium bicarbonate  650 mg Oral BID    sodium citrate-citric acid 500-334 mg/5 ml  30 mL Oral Once     Current Intravenous Infusions   NORepinephrine bitartrate-D5W 0.02 mcg/kg/min (23 1315)    propofoL 35 mcg/kg/min (23 0325)       Objective:     Intake/Output Summary (Last 24 hours) at 2023 05  Last data filed at 2023  Gross per 24 hour   Intake 469 ml   Output 1220 ml   Net -751 ml     Vital Signs (Most Recent):  Temp: (!) 95.6 °F (35.3 °C) (23 0400)  Pulse: 61 (23)  Resp: 20 (23)  BP: 104/66 (23)  SpO2: 99 % (23)  Body mass index is 27.51 kg/m².  Weight: 86.2 kg (190 lb) Vital Signs (24h Range):  Temp:  [94.6 °F (34.8 °C)-97.7 °F (36.5 °C)] 95.6 °F (35.3 °C)  Pulse:  [60-77] 61  Resp:  [16-25] 20  SpO2:  [92 %-100 %] 99 %  BP: ()/(54-80) 104/66     Physical Exam  General: intubated, on vent, sedated   HEENT: NC/AT; conjunctiva clear; nasal and oral mucosa moist and clear  Pulm: symmetric, no retractions  CV: S1, S2 w/o murmurs or gallops; no edema  noted  GI: Bowel sound present in all quadrants, abdomen distended   MSK: R knee wound vac in place  Derm: No new rashes, abnormal bruising, or skin lesions  : suprapubic catheter in place  Neuro: limited d/t sedation       Lines/Drains/Airways       Central Venous Catheter Line  Duration                  Hemodialysis Catheter 07/04/23 0900 right internal jugular <1 day              Drain  Duration                  Suprapubic Catheter -- days         NG/OG Tube 07/04/23 0653 Center mouth <1 day              Airway  Duration                  Airway - Non-Surgical 07/04/23 0422 Endotracheal Tube 1 day              Peripheral Intravenous Line  Duration                  Midline Catheter Insertion/Assessment  - Single Lumen 07/02/23 2128 Right basilic vein (medial side of arm) 2 days         Peripheral IV - Single Lumen 07/04/23 0600 18 G Anterior;Left Forearm <1 day         Peripheral IV - Single Lumen 07/04/23 0600 20 G Left;Posterior Hand <1 day                  Significant Labs:  Lab Results   Component Value Date    WBC 10.45 07/05/2023    HGB 8.5 (L) 07/05/2023    HCT 27.6 (L) 07/05/2023    MCV 77.7 (L) 07/05/2023     (H) 07/05/2023       BMP  Lab Results   Component Value Date     (L) 07/05/2023    K 5.3 (H) 07/05/2023    CHLORIDE 93 (L) 07/05/2023    CO2 20 (L) 07/05/2023    BUN 39.8 (H) 07/05/2023    CREATININE 7.07 (H) 07/05/2023    CALCIUM 8.1 (L) 07/05/2023    AGAP 9.0 08/29/2022    EGFRNONAA >60 04/27/2022     ABG  Recent Labs   Lab 07/04/23 0610   PH 7.360   PO2 214.0*   HCO3 26.0     Mechanical Ventilation Support:  Vent Mode: SIMV (07/05/23 0212)  Set Rate: 20 BPM (07/05/23 0212)  Vt Set: 450 mL (07/05/23 0212)  Pressure Support: 10 cmH20 (07/05/23 0212)  PEEP/CPAP: 10 cmH20 (07/05/23 0212)  Oxygen Concentration (%): 35 (07/05/23 0212)  Peak Airway Pressure: 27 cmH20 (07/05/23 0212)  Total Ve: 7 L/m (07/05/23 0212)  F/VT Ratio<105 (RSBI): (!) 56.98 (07/05/23 0212)    Significant  Imaging:  I have reviewed the pertinent imaging within the past 24 hours.    Assessment/Plan:     Assessment  Bilateral Pulmonary Infiltrates   Ddx includes infectious pneumonia vs. ARDS vs. Hydrostatic/nonhydrostatic pulmonary edema  Sputum Cx shows Gram pos cocci; MRSA PCR neg  Uremic encephalopathy 2/2 acute renal failure  Altered mental status noted on 7/4/2023 and was intubated on 7/4/2023 for airway protection.  Acute renal failure likely 2/2 medications (vancomycin) or fluid depletion  Worsening creatinine from 1.57 on 6/30/2023 to 6.69 on 7/3/2023; 7.07 this AM   Creatinine started to trend up since Vancomycin administration on 6/29/2023 and 6/30/2023  Vanc-induced ATN per nephrology   Hyponatremia  Sodium level was 131 on 6/28/2023, 125 on 7/3/2023; 128 this am (7/5)  Septic arthritis  Taken to the OR on 6/29/2023 by orthopedic surgery team and was found to have prepatellar bursa infection and septic arthritis  Wound culture 6/29/2023 positive for strep agalactiae.  HX of paraplegia 2/2 spinal cord injury (T1-T6), neurogenic bladder with suprapubic catheter, chronic right ankle osteomyelitis, DM II, HTN     Plan  -Admitted to ICU for ongoing monitoring and medical management  -ID, orthopedic, nephrology, wound care teams following, appreciate their assistance  -Temporary HD access placed on 7/4/23; HD per nephrology team  -Broadened IV Abx coverage to meropenem  -MRSA PCR neg  -Holding anti-depressants and anti-psychotics for now as these medications could worsen hyponatremia  -ECHO shows normal systolic function with EF 55%; intermediate diastolic function; mild MR, MS, TR, NV; mild LAE; pulmonary HTN   -Pending labs/studies: blood cultures x2, sputum culture     DVT Prophylaxis: SCD, lovenox  GI Prophylaxis: Famotidine     32 minutes of critical care was time spent personally by me on the following activities: development of treatment plan with patient or surrogate and bedside caregivers, discussions with  consultants, evaluation of patient's response to treatment, examination of patient, ordering and performing treatments and interventions, ordering and review of laboratory studies, ordering and review of radiographic studies, pulse oximetry, re-evaluation of patient's condition.  This critical care time did not overlap with that of any other provider or involve time for any procedures.     Omar Reza DO PGY-1  Pulmonary Critical Care Medicine  [facility] - [facility] ICU

## 2023-07-05 NOTE — PROGRESS NOTES
Inpatient Nutrition Assessment    Admit Date: 6/28/2023   Total duration of encounter: 7 days     Nutrition Recommendation/Prescription     Start tube feeding when appropriate.  Tube feeding recommendation:   Novasource Renal goal rate 35 ml/hr + 4 packets ProSource NoCarb daily to provide  1640 kcal/d (96% est needs, 128% with meds)  123 g protein/d (95% est needs)  504 ml free water/d  (calculations based on estimated 20 hr/d run time)     Also add Malachi (provides 90 kcal, 2.5 g protein per serving) BID.    Communication of Recommendations: reviewed with nurse    Nutrition Assessment     Malnutrition Assessment/Nutrition-Focused Physical Exam    Malnutrition Context: chronic illness  Malnutrition Level:  (does not meet criteria)  Energy Intake (Malnutrition):  (unable to obtain)  Weight Loss (Malnutrition):  (unable to obtain)  Subcutaneous Fat (Malnutrition):  (does not meet criteria)           Muscle Mass (Malnutrition):  (does not meet criteria)                          Fluid Accumulation (Malnutrition):  (does not meet criteria)        A minimum of two characteristics is recommended for diagnosis of either severe or non-severe malnutrition.    Chart Review    Reason Seen: continuous nutrition monitoring and follow-up    Malnutrition Screening Tool Results   Have you recently lost weight without trying?: Unsure  Have you been eating poorly because of a decreased appetite?: Yes   MST Score: 3     Diagnosis:  Bilateral Pulmonary Infiltrates   Uremic encephalopathy 2/2 acute renal failure  Acute renal failure  Hyponatremia  Septic arthritis    Relevant Medical History: paraplegia 2/2 spinal cord injury (T1-T6), neurogenic bladder with suprapubic catheter, chronic right ankle osteomyelitis, DM II, HTN    Nutrition-Related Medications: levophed @ 0.02mcg/kg/min, diprivan, furosemide  Calorie Containing IV Medications: Diprivan @ 21 ml/hr (provides 550 kcal/d)    Nutrition-Related Labs:  7/5 Na 128, K 5.3, Glu 93,  "BUN 39.8, Crea 7.07, Phos 7.1, GFR 8    Diet/PN Order: No diet orders on file  Oral Supplement Order: none  Tube Feeding Order: none  Appetite/Oral Intake: not applicable/not applicable  Factors Affecting Nutritional Intake: on mechanical ventilation  Food/Episcopal/Cultural Preferences: unable to obtain  Food Allergies: none reported    Skin Integrity: wound, drain/device(s)  Wound(s):      Altered Skin Integrity 06/28/23 2230 medial Sacral spine #1-Tissue loss description: Partial thickness       Altered Skin Integrity 06/28/23 2230 Left posterior Buttocks #4-Tissue loss description: Partial thickness       Altered Skin Integrity 06/28/23 2230 Right lateral Ankle #6-Tissue loss description: Partial thickness     Comments    7/5/23: Discussed with RN. Will provide tube feeding recommendations for when appropriate to start tube feeding. Receiving kcal from meds. Noted K and Phos, will need renal formula at this time. Will add supplemental protein and Malachi for wound healing.     Anthropometrics    Height: 5' 9.69" (177 cm) Height Method: Stated  Last Weight: 86.2 kg (190 lb) (07/04/23 1114) Weight Method: Standard Scale (stated)  BMI (Calculated): 27.5  BMI Classification: overweight (BMI 25-29.9)        Ideal Body Weight (IBW), Male: 164.14 lb     % Ideal Body Weight, Male (lb): 115.75 %                          Usual Weight Provided By: unable to obtain usual weight    Wt Readings from Last 5 Encounters:   07/04/23 86.2 kg (190 lb)   09/13/22 86.2 kg (190 lb)   08/25/22 86.2 kg (190 lb 0.6 oz)   01/04/22 86.2 kg (189 lb 15.9 oz)     Weight Change(s) Since Admission:  Admit Weight: 86.2 kg (190 lb) (06/28/23 1346)    Estimated Needs    Weight Used For Calorie Calculations: 86.2 kg (190 lb 0.6 oz)  Energy Calorie Requirements (kcal): 1714kcal  Energy Need Method: [facility]  Weight Used For Protein Calculations: 86.2 kg (190 lb 0.6 oz)  Protein Requirements: 130gm (1.5g/kg)  Fluid Requirements (mL): 1000ml + urinary " output  Temp (24hrs), Av.3 °F (36.3 °C), Min:95.6 °F (35.3 °C), Max:97.7 °F (36.5 °C)    Vtot (L/Min) for [place] Equation Calculation: 7.1    Enteral Nutrition    Patient not receiving enteral nutrition at this time.    Parenteral Nutrition    Patient not receiving parenteral nutrition support at this time.    Evaluation of Received Nutrient Intake    Calories: not meeting estimated needs  Protein: not meeting estimated needs    Patient Education    Not applicable.    Nutrition Diagnosis     PES: Inadequate oral intake related to acute illness as evidenced by intubation since 23. (new)    Interventions/Goals     Intervention(s): modified composition of enteral nutrition, modified rate of enteral nutrition, and collaboration with other providers  Goal: Meet greater than 75% of nutritional needs by follow-up. (new)    Monitoring & Evaluation     Dietitian will monitor energy intake.  Nutrition Risk/Follow-Up: high (follow-up in 1-4 days)   Please consult if re-assessment needed sooner.

## 2023-07-05 NOTE — NURSING
Nurses Note -- 4 Eyes      7/5/2023   4:38 PM      Skin assessed during: Daily Assessment      [] No Altered Skin Integrity Present    [x]Prevention Measures Documented      [x] Yes- Altered Skin Integrity Present or Discovered   [x] LDA Added if Not in Epic (Describe Wound)   [x] New Altered Skin Integrity was Present on Admit and Documented in LDA   [] Wound Image Taken    Wound Care Consulted? Yes    Attending Nurse:  Herman Townsend RN     Second RN/Staff Member:  GADIEL Brock RN

## 2023-07-05 NOTE — PLAN OF CARE
Problem: Adult Inpatient Plan of Care  Goal: Plan of Care Review  Outcome: Ongoing, Progressing  Goal: Patient-Specific Goal (Individualized)  Outcome: Ongoing, Progressing  Goal: Absence of Hospital-Acquired Illness or Injury  Outcome: Ongoing, Progressing  Goal: Optimal Comfort and Wellbeing  Outcome: Ongoing, Progressing     Problem: Skin Injury Risk Increased  Goal: Skin Health and Integrity  Outcome: Ongoing, Progressing     Problem: Impaired Wound Healing  Goal: Optimal Wound Healing  Outcome: Ongoing, Progressing     Problem: Infection  Goal: Absence of Infection Signs and Symptoms  Outcome: Ongoing, Progressing     Problem: Communication Impairment (Mechanical Ventilation, Invasive)  Goal: Effective Communication  Outcome: Ongoing, Progressing     Problem: Device-Related Complication Risk (Mechanical Ventilation, Invasive)  Goal: Optimal Device Function  Outcome: Ongoing, Progressing     Problem: Inability to Wean (Mechanical Ventilation, Invasive)  Goal: Mechanical Ventilation Liberation  Outcome: Ongoing, Progressing     Problem: Skin and Tissue Injury (Mechanical Ventilation, Invasive)  Goal: Absence of Device-Related Skin and Tissue Injury  Outcome: Ongoing, Progressing     Problem: Ventilator-Induced Lung Injury (Mechanical Ventilation, Invasive)  Goal: Absence of Ventilator-Induced Lung Injury  Outcome: Ongoing, Progressing     Problem: Communication Impairment (Artificial Airway)  Goal: Effective Communication  Outcome: Ongoing, Progressing     Problem: Device-Related Complication Risk (Artificial Airway)  Goal: Optimal Device Function  Outcome: Ongoing, Progressing     Problem: Skin and Tissue Injury (Artificial Airway)  Goal: Absence of Device-Related Skin or Tissue Injury  Outcome: Ongoing, Progressing     Problem: Device-Related Complication Risk (Hemodialysis)  Goal: Safe, Effective Therapy Delivery  Outcome: Ongoing, Progressing     Problem: Hemodynamic Instability (Hemodialysis)  Goal:  Effective Tissue Perfusion  Outcome: Ongoing, Progressing

## 2023-07-05 NOTE — NURSING
07/05/23 1107   Post-Hemodialysis Assessment   Blood Volume Processed (Liters) 54 L   Dialyzer Clearance Lightly streaked   Duration of Treatment 180 minutes   Total UF (mL) 2000 mL   Patient Response to Treatment Pt tolerated well   Post-Hemodialysis Comments Tx ended, PT reinfused.

## 2023-07-05 NOTE — NURSING
Nurses Note -- 4 Eyes      7/5/2023   12:57 AM      Skin assessed during: Daily Assessment      [] No Altered Skin Integrity Present    []Prevention Measures Documented      [x] Yes- Altered Skin Integrity Present or Discovered   [] LDA Added if Not in Epic (Describe Wound)   [] New Altered Skin Integrity was Present on Admit and Documented in LDA   [] Wound Image Taken    Wound Care Consulted? Yes    Attending Nurse:  Elton Diallo RN     Second RN/Staff Member:  TONI Jacob

## 2023-07-06 LAB
ALBUMIN SERPL-MCNC: 2.1 G/DL (ref 3.5–5)
ALBUMIN/GLOB SERPL: 0.5 RATIO (ref 1.1–2)
ALLENS TEST BLOOD GAS (OHS): ABNORMAL
ALLENS TEST BLOOD GAS (OHS): YES
ALLENS TEST BLOOD GAS (OHS): YES
ALP SERPL-CCNC: 70 UNIT/L (ref 40–150)
ALT SERPL-CCNC: 7 UNIT/L (ref 0–55)
AST SERPL-CCNC: 20 UNIT/L (ref 5–34)
BACTERIA SPEC CULT: ABNORMAL
BASE EXCESS BLD CALC-SCNC: 2.6 MMOL/L (ref -2–2)
BASE EXCESS BLD CALC-SCNC: 3 MMOL/L
BASE EXCESS BLD CALC-SCNC: 3.1 MMOL/L (ref -2–2)
BASOPHILS # BLD AUTO: 0.02 X10(3)/MCL
BASOPHILS NFR BLD AUTO: 0.2 %
BILIRUBIN DIRECT+TOT PNL SERPL-MCNC: 0.3 MG/DL
BLOOD GAS SAMPLE TYPE (OHS): ABNORMAL
BUN SERPL-MCNC: 31.2 MG/DL (ref 8.4–25.7)
CA-I BLD-SCNC: 1.07 MMOL/L (ref 1.12–1.23)
CA-I BLD-SCNC: 1.08 MMOL/L (ref 1.12–1.23)
CA-I BLD-SCNC: 1.11 MMOL/L (ref 1.12–1.23)
CALCIUM SERPL-MCNC: 8.1 MG/DL (ref 8.4–10.2)
CHLORIDE SERPL-SCNC: 96 MMOL/L (ref 98–107)
CO2 BLDA-SCNC: 29.2 MMOL/L
CO2 BLDA-SCNC: 29.9 MMOL/L
CO2 BLDA-SCNC: 30.3 MMOL/L
CO2 SERPL-SCNC: 25 MMOL/L (ref 22–29)
COHGB MFR BLDA: 1.5 % (ref 0.5–1.5)
CREAT SERPL-MCNC: 5.54 MG/DL (ref 0.73–1.18)
DRAWN BY BLOOD GAS (OHS): ABNORMAL
EOSINOPHIL # BLD AUTO: 0.04 X10(3)/MCL (ref 0–0.9)
EOSINOPHIL NFR BLD AUTO: 0.3 %
ERYTHROCYTE [DISTWIDTH] IN BLOOD BY AUTOMATED COUNT: 17.8 % (ref 11.5–17)
FIO2 BLOOD GAS (OHS): 50 %
FIO2 BLOOD GAS (OHS): 60 %
FIO2 BLOOD GAS (OHS): 70 %
GFR SERPLBLD CREATININE-BSD FMLA CKD-EPI: 11 MLS/MIN/1.73/M2
GLOBULIN SER-MCNC: 4 GM/DL (ref 2.4–3.5)
GLUCOSE SERPL-MCNC: 111 MG/DL (ref 74–100)
GRAM STN SPEC: ABNORMAL
GRAM STN SPEC: ABNORMAL
HBV SURFACE AB SER QL IA: NEGATIVE
HBV SURFACE AB SERPL IA-ACNC: <5 MIU/ML
HCO3 BLDA-SCNC: 27.9 MMOL/L
HCO3 BLDA-SCNC: 28.5 MMOL/L (ref 22–26)
HCO3 BLDA-SCNC: 28.7 MMOL/L (ref 22–26)
HCT VFR BLD AUTO: 24.2 % (ref 42–52)
HGB BLD-MCNC: 7.8 G/DL (ref 14–18)
IMM GRANULOCYTES # BLD AUTO: 0.13 X10(3)/MCL (ref 0–0.04)
IMM GRANULOCYTES NFR BLD AUTO: 1.1 %
LYMPHOCYTES # BLD AUTO: 1.29 X10(3)/MCL (ref 0.6–4.6)
LYMPHOCYTES NFR BLD AUTO: 10.7 %
MAGNESIUM SERPL-MCNC: 2 MG/DL (ref 1.6–2.6)
MCH RBC QN AUTO: 24.5 PG (ref 27–31)
MCHC RBC AUTO-ENTMCNC: 32.2 G/DL (ref 33–36)
MCV RBC AUTO: 75.9 FL (ref 80–94)
MECH RR (OHS): 10 B/MIN
MECH VT (OHS): 450 ML
METHGB MFR BLDA: 1 % (ref 0.4–1.5)
MODE (OHS): AC
MONOCYTES # BLD AUTO: 0.63 X10(3)/MCL (ref 0.1–1.3)
MONOCYTES NFR BLD AUTO: 5.2 %
NEUTROPHILS # BLD AUTO: 9.94 X10(3)/MCL (ref 2.1–9.2)
NEUTROPHILS NFR BLD AUTO: 82.5 %
NRBC BLD AUTO-RTO: 0 %
O2 HB BLOOD GAS (OHS): 92.2 % (ref 94–97)
OXYGEN DEVICE BLOOD GAS (OHS): ABNORMAL
OXYHGB MFR BLDA: 8.4 G/DL (ref 12–16)
PCO2 BLDA: 43 MMHG (ref 35–45)
PCO2 BLDA: 46 MMHG (ref 35–45)
PCO2 BLDA: 52 MMHG (ref 35–45)
PEEP (OHS): 10 CMH2O
PH BLDA: 7.35 [PH] (ref 7.35–7.45)
PH BLDA: 7.4 [PH] (ref 7.35–7.45)
PH BLDA: 7.42 [PH] (ref 7.35–7.45)
PLATELET # BLD AUTO: 629 X10(3)/MCL (ref 130–400)
PMV BLD AUTO: 9.9 FL (ref 7.4–10.4)
PO2 BLDA: 68 MMHG (ref 80–100)
PO2 BLDA: 69 MMHG (ref 80–100)
PO2 BLDA: 73 MMHG (ref 80–100)
POTASSIUM BLOOD GAS (OHS): 4.1 MMOL/L (ref 3.5–5)
POTASSIUM BLOOD GAS (OHS): 4.1 MMOL/L (ref 3.5–5)
POTASSIUM BLOOD GAS (OHS): 4.5 MMOL/L (ref 3.5–5)
POTASSIUM SERPL-SCNC: 4.7 MMOL/L (ref 3.5–5.1)
PROT SERPL-MCNC: 6.1 GM/DL (ref 6.4–8.3)
RBC # BLD AUTO: 3.19 X10(6)/MCL (ref 4.7–6.1)
SAMPLE SITE BLOOD GAS (OHS): ABNORMAL
SAO2 % BLDA: 93 %
SAO2 % BLDA: 93.7 %
SAO2 % BLDA: 94 %
SODIUM BLOOD GAS (OHS): 126 MMOL/L (ref 137–145)
SODIUM BLOOD GAS (OHS): 128 MMOL/L (ref 137–145)
SODIUM BLOOD GAS (OHS): 128 MMOL/L (ref 137–145)
SODIUM SERPL-SCNC: 129 MMOL/L (ref 136–145)
WBC # SPEC AUTO: 12.05 X10(3)/MCL (ref 4.5–11.5)

## 2023-07-06 PROCEDURE — 80100014 HC HEMODIALYSIS 1:1

## 2023-07-06 PROCEDURE — 25000003 PHARM REV CODE 250

## 2023-07-06 PROCEDURE — 63600175 PHARM REV CODE 636 W HCPCS: Performed by: INTERNAL MEDICINE

## 2023-07-06 PROCEDURE — 94761 N-INVAS EAR/PLS OXIMETRY MLT: CPT

## 2023-07-06 PROCEDURE — 36600 WITHDRAWAL OF ARTERIAL BLOOD: CPT

## 2023-07-06 PROCEDURE — 99900026 HC AIRWAY MAINTENANCE (STAT)

## 2023-07-06 PROCEDURE — 25000003 PHARM REV CODE 250: Performed by: INTERNAL MEDICINE

## 2023-07-06 PROCEDURE — 27000221 HC OXYGEN, UP TO 24 HOURS

## 2023-07-06 PROCEDURE — 27200966 HC CLOSED SUCTION SYSTEM

## 2023-07-06 PROCEDURE — 94640 AIRWAY INHALATION TREATMENT: CPT

## 2023-07-06 PROCEDURE — 20000000 HC ICU ROOM

## 2023-07-06 PROCEDURE — 25000242 PHARM REV CODE 250 ALT 637 W/ HCPCS

## 2023-07-06 PROCEDURE — 99900035 HC TECH TIME PER 15 MIN (STAT)

## 2023-07-06 PROCEDURE — 63600175 PHARM REV CODE 636 W HCPCS: Performed by: STUDENT IN AN ORGANIZED HEALTH CARE EDUCATION/TRAINING PROGRAM

## 2023-07-06 PROCEDURE — 84145 PROCALCITONIN (PCT): CPT | Performed by: INTERNAL MEDICINE

## 2023-07-06 PROCEDURE — 83735 ASSAY OF MAGNESIUM: CPT | Performed by: INTERNAL MEDICINE

## 2023-07-06 PROCEDURE — 97605 NEG PRS WND THER DME<=50SQCM: CPT

## 2023-07-06 PROCEDURE — 94003 VENT MGMT INPAT SUBQ DAY: CPT

## 2023-07-06 PROCEDURE — 25000003 PHARM REV CODE 250: Performed by: STUDENT IN AN ORGANIZED HEALTH CARE EDUCATION/TRAINING PROGRAM

## 2023-07-06 PROCEDURE — 82803 BLOOD GASES ANY COMBINATION: CPT

## 2023-07-06 PROCEDURE — 63600175 PHARM REV CODE 636 W HCPCS

## 2023-07-06 PROCEDURE — 85025 COMPLETE CBC W/AUTO DIFF WBC: CPT | Performed by: STUDENT IN AN ORGANIZED HEALTH CARE EDUCATION/TRAINING PROGRAM

## 2023-07-06 PROCEDURE — 80053 COMPREHEN METABOLIC PANEL: CPT | Performed by: STUDENT IN AN ORGANIZED HEALTH CARE EDUCATION/TRAINING PROGRAM

## 2023-07-06 RX ORDER — SODIUM CHLORIDE 9 MG/ML
INJECTION, SOLUTION INTRAVENOUS
Status: CANCELLED | OUTPATIENT
Start: 2023-07-06

## 2023-07-06 RX ORDER — DEXMEDETOMIDINE HYDROCHLORIDE 4 UG/ML
0-1.4 INJECTION, SOLUTION INTRAVENOUS CONTINUOUS
Status: DISCONTINUED | OUTPATIENT
Start: 2023-07-06 | End: 2023-08-09 | Stop reason: HOSPADM

## 2023-07-06 RX ORDER — SODIUM CHLORIDE 9 MG/ML
INJECTION, SOLUTION INTRAVENOUS ONCE
Status: CANCELLED | OUTPATIENT
Start: 2023-07-06 | End: 2023-07-06

## 2023-07-06 RX ORDER — ACETYLCYSTEINE 100 MG/ML
4 SOLUTION ORAL; RESPIRATORY (INHALATION)
Status: COMPLETED | OUTPATIENT
Start: 2023-07-06 | End: 2023-07-06

## 2023-07-06 RX ORDER — MIDAZOLAM HYDROCHLORIDE 1 MG/ML
2 INJECTION INTRAMUSCULAR; INTRAVENOUS ONCE
Status: COMPLETED | OUTPATIENT
Start: 2023-07-06 | End: 2023-07-06

## 2023-07-06 RX ADMIN — MUPIROCIN: 20 OINTMENT TOPICAL at 08:07

## 2023-07-06 RX ADMIN — OXYBUTYNIN CHLORIDE 5 MG: 5 TABLET ORAL at 08:07

## 2023-07-06 RX ADMIN — MIDAZOLAM HYDROCHLORIDE 2 MG: 1 INJECTION, SOLUTION INTRAMUSCULAR; INTRAVENOUS at 03:07

## 2023-07-06 RX ADMIN — FUROSEMIDE 40 MG: 10 INJECTION, SOLUTION INTRAMUSCULAR; INTRAVENOUS at 06:07

## 2023-07-06 RX ADMIN — DOXYCYCLINE HYCLATE 100 MG: 100 TABLET, COATED ORAL at 08:07

## 2023-07-06 RX ADMIN — ATORVASTATIN CALCIUM 20 MG: 10 TABLET, FILM COATED ORAL at 08:07

## 2023-07-06 RX ADMIN — PROPOFOL 35 MCG/KG/MIN: 10 INJECTION, EMULSION INTRAVENOUS at 07:07

## 2023-07-06 RX ADMIN — GUAIFENESIN 400 MG: 200 SOLUTION ORAL at 01:07

## 2023-07-06 RX ADMIN — SODIUM BICARBONATE 650 MG TABLET 650 MG: at 08:07

## 2023-07-06 RX ADMIN — GUAIFENESIN 400 MG: 200 SOLUTION ORAL at 10:07

## 2023-07-06 RX ADMIN — ACETYLCYSTEINE 4 ML: 100 INHALANT RESPIRATORY (INHALATION) at 11:07

## 2023-07-06 RX ADMIN — CARVEDILOL 6.25 MG: 3.12 TABLET, FILM COATED ORAL at 08:07

## 2023-07-06 RX ADMIN — PROPOFOL 50 MCG/KG/MIN: 10 INJECTION, EMULSION INTRAVENOUS at 08:07

## 2023-07-06 RX ADMIN — GUAIFENESIN 400 MG: 200 SOLUTION ORAL at 02:07

## 2023-07-06 RX ADMIN — GUAIFENESIN 400 MG: 200 SOLUTION ORAL at 05:07

## 2023-07-06 RX ADMIN — GUAIFENESIN 400 MG: 200 SOLUTION ORAL at 06:07

## 2023-07-06 RX ADMIN — MICONAZOLE NITRATE 2 % TOPICAL POWDER: at 08:07

## 2023-07-06 RX ADMIN — GUAIFENESIN 400 MG: 200 SOLUTION ORAL at 11:07

## 2023-07-06 RX ADMIN — PROPOFOL 50 MCG/KG/MIN: 10 INJECTION, EMULSION INTRAVENOUS at 04:07

## 2023-07-06 RX ADMIN — ACETYLCYSTEINE 4 ML: 100 INHALANT RESPIRATORY (INHALATION) at 07:07

## 2023-07-06 RX ADMIN — PROPOFOL 50 MCG/KG/MIN: 10 INJECTION, EMULSION INTRAVENOUS at 06:07

## 2023-07-06 RX ADMIN — DEXMEDETOMIDINE HYDROCHLORIDE 0.2 MCG/KG/HR: 400 INJECTION INTRAVENOUS at 07:07

## 2023-07-06 RX ADMIN — MEROPENEM 1 G: 1 INJECTION, POWDER, FOR SOLUTION INTRAVENOUS at 08:07

## 2023-07-06 RX ADMIN — ENOXAPARIN SODIUM 30 MG: 30 INJECTION SUBCUTANEOUS at 06:07

## 2023-07-06 RX ADMIN — FENOFIBRATE 48 MG: 48 TABLET, FILM COATED ORAL at 08:07

## 2023-07-06 RX ADMIN — PROPOFOL 50 MCG/KG/MIN: 10 INJECTION, EMULSION INTRAVENOUS at 12:07

## 2023-07-06 RX ADMIN — AMLODIPINE BESYLATE 5 MG: 5 TABLET ORAL at 08:07

## 2023-07-06 RX ADMIN — FUROSEMIDE 40 MG: 10 INJECTION, SOLUTION INTRAMUSCULAR; INTRAVENOUS at 08:07

## 2023-07-06 RX ADMIN — PROPOFOL 50 MCG/KG/MIN: 10 INJECTION, EMULSION INTRAVENOUS at 02:07

## 2023-07-06 RX ADMIN — FAMOTIDINE 20 MG: 10 INJECTION, SOLUTION INTRAVENOUS at 08:07

## 2023-07-06 RX ADMIN — PROPOFOL 50 MCG/KG/MIN: 10 INJECTION, EMULSION INTRAVENOUS at 10:07

## 2023-07-06 NOTE — NURSING
Nurses Note -- 4 Eyes      7/6/2023   6:08 PM      Skin assessed during: Daily Assessment      [] No Altered Skin Integrity Present    [x]Prevention Measures Documented      [x] Yes- Altered Skin Integrity Present or Discovered   [] LDA Added if Not in Epic (Describe Wound)   [] New Altered Skin Integrity was Present on Admit and Documented in LDA   [] Wound Image Taken    Wound Care Consulted? Yes    Attending Nurse:  Herman Townsend RN     Second RN/Staff Member:  KOLE Domínguez RN

## 2023-07-06 NOTE — PROCEDURES
"Bianca Khan is a 58 y.o. male patient.    Temp: 98.6 °F (37 °C) (07/06/23 1130)  Pulse: 99 (07/06/23 1400)  Resp: (!) 31 (07/06/23 1400)  BP: (!) 147/88 (07/06/23 1400)  SpO2: (!) 91 % (07/06/23 1400)  Weight: 86.2 kg (190 lb) (07/04/23 1114)  Height: 5' 9.69" (177 cm) (07/05/23 1249)       Bladder Cath    Date/Time: 7/6/2023 3:33 PM  Location procedure was performed: [facility]  Performed by: DESTINY Carl  Authorized by: DESTINY Carl   Indications: neurogenic bladder and urine output monitoring  Preparation: Patient was prepped and draped in the usual sterile fashion.  Catheter insertion: indwelling  Catheter type: SP tube.  Catheter size: 22 Fr  Complicated insertion: no  Altered anatomy: no  Bladder irrigation: yes  Number of attempts: 1  Patient tolerance: Patient tolerated the procedure well with no immediate complications        7/6/2023    "

## 2023-07-06 NOTE — NURSING
Spoke with wife Katrina @ 4549 about evening update and plan of care for the day. All questions and concerns answered.

## 2023-07-06 NOTE — PROGRESS NOTES
Progress Note  Nephrology    Admit Date: 6/28/2023   LOS: 8 days     SUBJECTIVE:     Follow-up For:  ANTON / ATN    Interval History:  57 Y/O male with history of paraplegia , neurogenic bladder with suprapubic cath and H/O Right knee infection and osteomyelitis admitted to hospital for Right knee infection with strep and in need of IV antibiotics   Pt also had Oliguria with increase of BUN and CR   Started on hemodialysis and had third dialysis today and 1.3 liter of fluid removed today     Review of Systems:  Constitutional: No fever or chills   Respiratory: No cough or shortness of breath , still on vent and intubated   Cardiovascular: No chest pain or palpitations  Gastrointestinal: No nausea or vomiting  Neurological: No confusion or weakness    OBJECTIVE:     Vital Signs Range (Last 24H):  Vitals:    07/06/23 1400   BP: (!) 147/88   Pulse: 99   Resp: (!) 31   Temp:        Temp:  [97.5 °F (36.4 °C)-98.6 °F (37 °C)]   Pulse:  []   Resp:  [11-33]   BP: ()/(58-91)   SpO2:  [89 %-100 %]     I & O (Last 24H):  Intake/Output Summary (Last 24 hours) at 7/6/2023 1530  Last data filed at 7/6/2023 1137  Gross per 24 hour   Intake 2393.19 ml   Output 1875 ml   Net 518.19 ml       Physical Exam:  Intubated and on vent   Head   normal   Neck   supple   Chest   symmetric   Lungs   on vent   Heart    RRR  Abdomen   soft , non tender   Ext  no edema     Laboratory Data:    Recent Labs   Lab 07/05/23  0138 07/06/23  0149   * 129*   K 5.3* 4.7   CO2 20* 25   BUN 39.8* 31.2*   CREATININE 7.07* 5.54*   GLUCOSE 78 111*   CALCIUM 8.1* 8.1*   PHOS 7.1*  --      Lab Results   Component Value Date    .3 (H) 07/04/2023    CALCIUM 8.1 (L) 07/06/2023    CAION 1.07 (L) 07/06/2023    PHOS 7.1 (H) 07/05/2023     Lab Results   Component Value Date    IRON 35 (L) 12/30/2021    TIBC 284 12/30/2021    FERRITIN 24.99 12/30/2021       Medications:  Medication list was reviewed and changes noted under  Assessment/Plan.    Diagnostic Results:    Reviewed most recent CT/US/Echo/MRI    ASSESSMENT/PLAN:   1   ANTON / ATN   2   hyponatremia   improving   3   paraplegia  4   Right knee infection   5   neurogenic bladder   6   anemia  7   HTN  8   DM 2  9   A Fib   10  SHPT  11   CKD  stage unspecified       Plan    HD in AM again and will try to pull 2.5 liter              Hyponatremia  started to imoprove with fluid             removal

## 2023-07-06 NOTE — PROGRESS NOTES
[facility] - [facility]  Pulmonary Critical Care Note    Patient Name: Bianca Khan  MRN: [id number]  Admission Date: 6/28/2023  Hospital Length of Stay: 8 days  Code Status: Full Code  Attending Provider: Khris Treadwell Jr., MD, *  Primary Care Provider: Areli Vargas PA-C     Subjective:     HPI:   Bianca Khan is a 58 y.o. male with PMH of paraplegia 2/2 spinal cord injury (T1-T6), neurogenic bladder with suprapubic catheter, chronic right ankle osteomyelitis, DM II, HTN, who presented to the ED on 6/28/2023 with CC of right knee pain. Per chart review, CT of right knee revealed 5 cm area of subcutaneous fluid in prepatellar region which was aspirated in the ED; he was taken to the OR on 6/29/2023 by orthopedic surgery team and was found to have prepatellar bursa infection and septic arthritis. Wound culture 6/29/2023 positive for strep agalactiae. Orthopedic surgery team recommended right-sided above-knee amputation d/t chronically infected hardware in right lower extremity. On 7/4/2023, a RRT was called d/t acute respiratory distress that was not improving despite being placed on vapotherm at 35 L/min with FiO2 100%. At the time of examination, patient's initial GCS was 10 but progressively reduced to a GCS of 6. Shared decision with patient's wife was made to intubate patient for airway protection though ABGs were within normal limits. He is admitted to the ICU for close monitoring and further medical management.    Hospital Course/Significant events:  6/29/2023 - I&D of right knee  7/4/2023 - Admitted to ICU, intubated for airway protection d/t altered mental status. Trialysis catheter placed and HD began for worsening renal function. 1 L removed via HD; required minimal levophed during HD.  7/5/2023 - 2L removed via HD without pressor support; patient remains vented, stable.     24 Hour Interval History:  No acute events overnight. Labs this AM: WBC spike (10.45 > 12.05) with left  shift; improving HypoNa, HyperK, uremia, Scr. I/O: 25 mL urine output past 24 hours, net negative 0.5 L    Past Medical History:   Diagnosis Date    Arthritis     Chronic ulcer of ankle 05/26/2022    Frequent UTI 07/02/2019    Generalized anxiety disorder 05/26/2022    Neurogenic bladder 05/26/2022    Osteomyelitis 05/26/2022    Presence of suprapubic catheter 05/26/2022    Pure hypercholesterolemia 05/26/2022    Retention of urine, unspecified 08/09/2019    Spinal cord injury at T1-T6 level 04/20/2018       Past Surgical History:   Procedure Laterality Date    INCISION AND DRAINAGE, LOWER EXTREMITY Right 6/29/2023    Procedure: INCISION AND DRAINAGE, LOWER EXTREMITY;  Surgeon: Prabhu Shen DO;  Location: [facility];  Service: Orthopedics;  Laterality: Right;  supine bone foam wash stuff cultures    INSERTION OF INTRAMEDULLARY MARISA Right 8/10/2022    Procedure: INSERTION, INTRAMEDULLARY MARISA RIGHT TIBIA;  Surgeon: Jorge Orellana MD;  Location: [facility];  Service: Orthopedics;  Laterality: Right;       Social History     Socioeconomic History    Marital status:    Tobacco Use    Smoking status: Every Day     Types: Cigars, Cigarettes    Smokeless tobacco: Never   Substance and Sexual Activity    Alcohol use: Yes     Alcohol/week: 2.0 standard drinks     Types: 2 Cans of beer per week    Drug use: Not Currently    Sexual activity: Never       Current Outpatient Medications   Medication Instructions    amitriptyline (ELAVIL) 50 mg, Oral, Nightly    amLODIPine (NORVASC) 10 MG tablet 1 tablet    atorvastatin (LIPITOR) 20 mg, Oral, Nightly    busPIRone (BUSPAR) 5 MG Tab 1 tablet    carvediloL (COREG) 12.5 mg, Oral    cyclobenzaprine (FLEXERIL) 10 mg, Oral, 2 times daily PRN    doxycycline (VIBRAMYCIN) 100 mg, Oral, Daily    fenofibrate 160 mg, Oral    FLUoxetine 20 mg, Oral    gabapentin (NEURONTIN) 300 MG capsule 1 capsule    lisinopriL 10 mg, Oral, Daily    LORazepam (ATIVAN) 1 mg, Oral, 2 times daily    melatonin  (MELATIN) 3 mg tablet TAKE TWO TABLETS BY MOUTH EVERY NIGHT AT BEDTIME AS NEEDED FOR INSOMNIA.    meloxicam (MOBIC) 15 mg, Oral, Daily    metFORMIN (GLUCOPHAGE) 500 MG tablet SMARTSI Tablet(s) By Mouth Morning-Evening    oxybutynin (DITROPAN) 5 mg, Oral, 2 times daily    oxyCODONE-acetaminophen (PERCOCET)  mg per tablet 1 tablet, Oral, Every 6 hours PRN    pantoprazole (PROTONIX) 40 MG tablet 1 tablet    temazepam (RESTORIL) 30 mg, Oral, Nightly    traZODone (DESYREL) 50 mg, Oral, Nightly    XARELTO 20 mg Tab SMARTSI Tablet(s) By Mouth Every Evening     Current Inpatient Medications   acetylcysteine 100 mg/ml (10%)  4 mL Nebulization TID WAKE    amLODIPine  5 mg Oral Daily    atorvastatin  20 mg Oral Nightly    carvediloL  6.25 mg Oral BID    doxycycline  100 mg Oral Q12H    enoxaparin  30 mg Subcutaneous Daily    famotidine (PF)  20 mg Intravenous Daily    fenofibrate  48 mg Oral Daily    furosemide (LASIX) injection  40 mg Intravenous BID    guaiFENesin 100 mg/5 ml  400 mg Per NG tube Q4H    meropenem (MERREM) IVPB  1 g Intravenous Q12H    miconazole NITRATE 2 %   Topical (Top) BID    mupirocin   Nasal BID    oxybutynin  5 mg Oral BID    sodium bicarbonate  650 mg Oral BID    sodium citrate-citric acid 500-334 mg/5 ml  30 mL Oral Once     Current Intravenous Infusions   NORepinephrine bitartrate-D5W 0.02 mcg/kg/min (23 1315)    propofoL 50 mcg/kg/min (23 0414)       Objective:     Intake/Output Summary (Last 24 hours) at 2023 0429  Last data filed at 2023 2200  Gross per 24 hour   Intake 1710.19 ml   Output 2225 ml   Net -514.81 ml     Vital Signs (Most Recent):  Temp: 98.1 °F (36.7 °C) (23 0400)  Pulse: 74 (23 0400)  Resp: 15 (23 0400)  BP: 106/66 (23 0400)  SpO2: 99 % (07/06/23 0400)  Body mass index is 27.51 kg/m².  Weight: 86.2 kg (190 lb) Vital Signs (24h Range):  Temp:  [97.5 °F (36.4 °C)-98.2 °F (36.8 °C)] 98.1 °F (36.7 °C)  Pulse:  [60-91] 74  Resp:   [11-22] 15  SpO2:  [89 %-100 %] 99 %  BP: ()/(58-81) 106/66     Physical Exam  Vitals and nursing note reviewed.   Constitutional:       Interventions: He is sedated and intubated.   HENT:      Head: Normocephalic and atraumatic.   Cardiovascular:      Rate and Rhythm: Normal rate and regular rhythm.      Pulses: Normal pulses.      Heart sounds: Normal heart sounds.   Pulmonary:      Effort: He is intubated.      Breath sounds: Wheezing present.   Abdominal:      General: Bowel sounds are normal. There is no distension.      Palpations: Abdomen is soft.   Musculoskeletal:         General: No swelling.   Skin:     General: Skin is warm and dry.       Lines/Drains/Airways       Central Venous Catheter Line  Duration                  Hemodialysis Catheter 07/04/23 0900 right internal jugular 1 day              Drain  Duration                  Suprapubic Catheter -- days         NG/OG Tube 07/04/23 0653 Center mouth 1 day              Airway  Duration                  Airway - Non-Surgical 07/04/23 0422 Endotracheal Tube 2 days              Peripheral Intravenous Line  Duration                  Midline Catheter Insertion/Assessment  - Single Lumen 07/02/23 2128 Right basilic vein (medial side of arm) 3 days         Peripheral IV - Single Lumen 07/04/23 0600 18 G Anterior;Left Forearm 1 day         Peripheral IV - Single Lumen 07/04/23 0600 20 G Left;Posterior Hand 1 day                  Significant Labs:  Lab Results   Component Value Date    WBC 12.05 (H) 07/06/2023    HGB 7.8 (L) 07/06/2023    HCT 24.2 (L) 07/06/2023    MCV 75.9 (L) 07/06/2023     (H) 07/06/2023       BMP  Lab Results   Component Value Date     (L) 07/06/2023    K 4.7 07/06/2023    CHLORIDE 96 (L) 07/06/2023    CO2 25 07/06/2023    BUN 31.2 (H) 07/06/2023    CREATININE 5.54 (H) 07/06/2023    CALCIUM 8.1 (L) 07/06/2023    AGAP 9.0 08/29/2022    EGFRNONAA >60 04/27/2022     ABG  Recent Labs   Lab 07/05/23  0550   PH 7.430   PO2 79.0*    HCO3 31.9     Mechanical Ventilation Support:  Vent Mode: VOLUME A/C (07/06/23 0234)  Set Rate: 10 BPM (07/06/23 0234)  Vt Set: 450 mL (07/06/23 0234)  Pressure Support: 10 cmH20 (07/05/23 0736)  PEEP/CPAP: 10 cmH20 (07/06/23 0234)  Oxygen Concentration (%): 50 (07/06/23 0400)  Peak Airway Pressure: 19 cmH20 (07/06/23 0234)  Total Ve: 5.8 L/m (07/06/23 0234)  F/VT Ratio<105 (RSBI): (!) 49.85 (07/06/23 0234)    Significant Imaging:  I have reviewed the pertinent imaging within the past 24 hours.    Assessment/Plan:     Assessment  Bilateral Pulmonary Infiltrates  DDX: infectious PNA vs. ARDS vs. Hydrostatic/non-hydrostatic pulmonary edema   Sputum Cx shows moderate yeast, rare GPC; MRSA PCR neg  Uremic encephalopathy 2/2 acute renal failure  AMS and intubation 7/04/23  Acute renal failure 2/2 vanc + fluid depletion   Cr spike following vanc   Scr downtrending   Remains anuric  Hyponatremia   Uptrending   Septic arthritis   R prepetella bursa infection I&D on 6/29/23 by ortho  Wound Cx 6/29 grew S. Agalactiae   Ortho recs above knee amputation   H/o paraplegia 2/2 T1-6 injury, neurogenic bladder s/p suprapubic catheter placement, chronic right ankle osteomyelitis, DM2, HTN, Paroxysmal Afib s/p permanent pacemaker     Plan  -Admitted to ICU for ongoing monitoring and medical management  -ID, orthopedic, nephrology, wound care teams following, appreciate their assistance  -Temporary HD access placed on 7/4/23; HD per nephrology team  -Meropenam (day 3)  -MRSA PCR neg  -Holding anti-depressants and anti-psychotics for now as these medications could worsen hyponatremia  -ECHO shows normal systolic function with EF 55%; intermediate diastolic function; mild MR, MS, TR, NC; mild LAE; pulmonary HTN   -Bcx no growth to date; SpCx shows moderate yeast, rare GPC     DVT Prophylaxis: SCD, lovenox  GI Prophylaxis: Famotidine    32 minutes of critical care was time spent personally by me on the following activities: development  of treatment plan with patient or surrogate and bedside caregivers, discussions with consultants, evaluation of patient's response to treatment, examination of patient, ordering and performing treatments and interventions, ordering and review of laboratory studies, ordering and review of radiographic studies, pulse oximetry, re-evaluation of patient's condition.  This critical care time did not overlap with that of any other provider or involve time for any procedures.     Omar Reza DO PGY-1  Pulmonary Critical Care Medicine  [facility] - [facility] ICU

## 2023-07-06 NOTE — NURSING
07/06/23 1100        Incision/Site 06/29/23 1103 Right Leg   Date First Assessed/Time First Assessed: 06/29/23 1103   Side: Right  Location: Leg   Wound Image     Dressing Appearance Intact;Moist drainage   Drainage Amount Small   Drainage Characteristics/Odor Serosanguineous;No odor   Appearance Red;Moist   Red (%), Wound Tissue Color 100 %   Periwound Area Edematous;Macerated   Wound Edges Irregular   Wound Length (cm) 3 cm   Wound Width (cm) 1 cm   Wound Depth (cm) 1 cm   Wound Volume (cm^3) 3 cm^3   Wound Surface Area (cm^2) 3 cm^2   Tunneling (depth (cm)/location) 12 noon ~6cm   Care Cleansed with:;Sterile normal saline   Dressing Changed        Negative Pressure Wound Therapy  06/29/23 1120 Right anterior   Placement Date/Time: 06/29/23 1120   Side: Right  Orientation: anterior  Location: Leg   NPWT Type Vacuum Therapy   Therapy Setting NPWT Continuous therapy   Pressure Setting NPWT 125 mmHg   Sponges Inserted NPWT Black;White;1   Sponges Removed NPWT Black;White;1     Changed out pt right knee wd vac sponges-1black piece and 1 white piece.  He is intubated sedated and undergoing dialysis at ths time.  No problems reported.   Care concerns with nurse Barnes.

## 2023-07-06 NOTE — PROGRESS NOTES
07/06/23 1121   Post-Hemodialysis Assessment   Blood Volume Processed (Liters) 63 L   Dialyzer Clearance Lightly streaked   Duration of Treatment 180 minutes   Total UF (mL) 1500 mL   Patient Response to Treatment Pt tolerated tx well

## 2023-07-06 NOTE — PROGRESS NOTES
Infectious Diseases Progress Note  58-year-old male with past medical history of T-spine cord injury with paraplegia, diabetes, HTN, hepatitis-C, chronic right ankle wound/osteomyelitis, was on suppressive doxycycline, known to my service, seen by us initially at [facility] and has followed with us at the office for chronic osteomyelitis, is admitted to [facility] on 06/28/2023 presenting through the ED with complaints of pain and swelling to his right knee, apparently struck his knee.  He did also report prior remote right knee surgery.  He was evaluated and noted to have no fevers initially but a temperature of 100.2° today 6/29, no leukocytosis but with thrombocytosis of up to 531 today.  .2 and ESR >130.  He is anemic with low albumin.  Blood cultures have been negative.  CT scan of the right knee noted a 5 cm area of subcutaneous fluid collection in the prepatellar region.  There was aspiration in the ER with findings of purulent fluid and cultures showing group B Streptococcus.  He was seen by the orthopedic surgery team with findings of right prepatellar bursa abscess and is status post incision and drainage of right knee septic arthritis and right prepatellar bursa abscess today 6/29 with cultures pending.    He is on Merrem.     Subjective:  Remains in the ICU, orally intubated on ventilator. No new complaints, no fevers, doing about the same.  In no acute distress      Past Medical History:   Diagnosis Date    Arthritis     Chronic ulcer of ankle 05/26/2022    Frequent UTI 07/02/2019    Generalized anxiety disorder 05/26/2022    Neurogenic bladder 05/26/2022    Osteomyelitis 05/26/2022    Presence of suprapubic catheter 05/26/2022    Pure hypercholesterolemia 05/26/2022    Retention of urine, unspecified 08/09/2019    Spinal cord injury at T1-T6 level 04/20/2018     Past Surgical History:   Procedure Laterality Date    INCISION AND DRAINAGE, LOWER EXTREMITY  "Right 6/29/2023    Procedure: INCISION AND DRAINAGE, LOWER EXTREMITY;  Surgeon: Prabhu Shen DO;  Location: [facility] OR;  Service: Orthopedics;  Laterality: Right;  supine bone foam wash stuff cultures    INSERTION OF INTRAMEDULLARY MARISA Right 8/10/2022    Procedure: INSERTION, INTRAMEDULLARY MARISA RIGHT TIBIA;  Surgeon: Jorge Orellana MD;  Location: [facility] OR;  Service: Orthopedics;  Laterality: Right;     Social History     Socioeconomic History    Marital status:    Tobacco Use    Smoking status: Every Day     Types: Cigars, Cigarettes    Smokeless tobacco: Never   Substance and Sexual Activity    Alcohol use: Yes     Alcohol/week: 2.0 standard drinks     Types: 2 Cans of beer per week    Drug use: Not Currently    Sexual activity: Never       Review of Systems   Unable to perform ROS: Intubated       Review of patient's allergies indicates:   Allergen Reactions    Baclofen Itching and Anxiety         Scheduled Meds:   amLODIPine  5 mg Oral Daily    atorvastatin  20 mg Oral Nightly    carvediloL  6.25 mg Oral BID    doxycycline  100 mg Oral Q12H    enoxaparin  30 mg Subcutaneous Daily    famotidine (PF)  20 mg Intravenous Daily    fenofibrate  48 mg Oral Daily    furosemide (LASIX) injection  40 mg Intravenous BID    guaiFENesin 100 mg/5 ml  400 mg Per NG tube Q4H    meropenem (MERREM) IVPB  1 g Intravenous Q12H    miconazole NITRATE 2 %   Topical (Top) BID    mupirocin   Nasal BID    oxybutynin  5 mg Oral BID    sodium bicarbonate  650 mg Oral BID    sodium citrate-citric acid 500-334 mg/5 ml  30 mL Oral Once     Continuous Infusions:   NORepinephrine bitartrate-D5W 0.02 mcg/kg/min (07/04/23 1315)    propofoL 50 mcg/kg/min (07/05/23 2024)     PRN Meds:acetaminophen, sodium chloride 0.9%, sodium chloride 0.9%    Objective:  /77   Pulse 82   Temp 97.7 °F (36.5 °C) (Oral)   Resp 20   Ht 5' 9.69" (1.77 m)   Wt 86.2 kg (190 lb)   SpO2 95%   BMI 27.51 kg/m²     Physical Exam:   Physical Exam  Vitals " reviewed.   Constitutional:       General: He is not in acute distress.     Appearance: He is ill-appearing.   HENT:      Head: Normocephalic and atraumatic.      Mouth/Throat:      Comments: ET tube in place  Cardiovascular:      Rate and Rhythm: Normal rate and regular rhythm.      Heart sounds: Normal heart sounds.   Pulmonary:      Effort: No respiratory distress.      Comments: Coarse breath sounds on ventilator  Abdominal:      General: Bowel sounds are normal. There is no distension.      Palpations: Abdomen is soft.      Tenderness: There is no abdominal tenderness.   Genitourinary:     Comments: Suprapubic catheter  Musculoskeletal:      Cervical back: Neck supple.      Comments: Some contracture of lower extremities   Skin:     Findings: No rash.      Comments: Right knee with wound VAC. Right ankle and left heel wounds dressed   Neurological:      Comments: Unable to fully evaluate, sedated on ventilator        Imaging  Imaging Results              CT Knee W W/O Contrast Right (Final result)  Result time 06/28/23 18:37:42      Final result by Gustavo Cassidy MD (06/28/23 18:37:42)                   Impression:      Mildly limited assessment.  5 cm area of subcutaneous fluid in the prepatellar region may have thin peripheral enhancement.  Fluid collection is possible.    Subcutaneous edema in the calf.      Electronically signed by: Gustavo Cassidy  Date:    06/28/2023  Time:    18:37               Narrative:    EXAMINATION:  CT KNEE W W/O CONTRAST RIGHT    CLINICAL HISTORY:  possible infection;    TECHNIQUE:  CT imaging of the right knee before and after IV contrast.  Axial, coronal and sagittal reformatted images reviewed.   Dose length product is 585 mGycm. Automatic exposure control, adjustment of mA/kV or iterative reconstruction technique used to limit radiation dose.    COMPARISON:  Radiograph 06/28/2023    FINDINGS:  Assessment mildly limited by motion.  Joint alignments are maintained with  degenerative changes at the knee.  Fixation hardware in the lower femur and tibia with remote proximal fibular fracture.  Areas of heterotopic ossification along the lower portion of the femur.  Small knee effusion.  Areas of subcutaneous edema anteriorly below the knee.  More focal area of fluid in the subcutaneous tissues of the prepatellar region measures up to 5 cm in transverse diameter and 5 cm in length.  There is image noise from the hardware, but this fluid may have thin areas of peripheral enhancement.  No tracking subcutaneous gas.                                       X-Ray Tibia Fibula 2 View Right (Final result)  Result time 06/28/23 18:14:06      Final result by Tracy Zamudio MD (06/28/23 18:14:06)                   Impression:      Posttraumatic and postsurgical changes at the femur and lower leg.  There is chronic deformity at the distal femur with heterogeneous sclerosis and lucency superimposed on background bony demineralization.  No old imaging of this region available for comparison.  This makes it difficult to evaluate for acute superimposed lytic change.    Postsurgical and posttraumatic change at the tibia and fibula with bony demineralization.  No appreciable acute osseous abnormality.      Electronically signed by: Tracy Zamudio  Date:    06/28/2023  Time:    18:14               Narrative:    EXAMINATION:  XR FEMUR 2 VIEW RIGHT; XR TIBIA FIBULA 2 VIEW RIGHT    CLINICAL HISTORY:  possible infection;; infection;    TECHNIQUE:  AP and lateral views of the right femur were performed.    AP and lateral views of the right tibia and fibula were obtained.    COMPARISON:  Knee radiographs 06/28/2023, tibia and fibular radiographs 08/10/2022    FINDINGS:  There are postsurgical changes of ORIF at the distal femur with lateral plate and screws.  There are postsurgical changes of ORIF at the tibia with antegrade intramedullary raven and proximal and distal interlocking screws.  Hardware  appears intact.  No acute fracture seen.  There are old, healed fracture deformities.    There is chronic remodeling at the distal femur with heterogeneous appearance of the marrow and cortex distally.  There are areas of lucency as well as sclerosis.  There is no imaging through the distal femur available for comparison to evaluate for acute lytic changes superimposed on chronic background posttraumatic and postsurgical change.  Older prior radiographs of the tibia and fibula do not adequately imaged the area.  The bones are demineralized.    There is soft tissue swelling at the knee.  There is soft tissue swelling at the ankle.                                       X-Ray Femur 2 View Right (Final result)  Result time 06/28/23 18:14:06      Final result by Tracy Zamudio MD (06/28/23 18:14:06)                   Impression:      Posttraumatic and postsurgical changes at the femur and lower leg.  There is chronic deformity at the distal femur with heterogeneous sclerosis and lucency superimposed on background bony demineralization.  No old imaging of this region available for comparison.  This makes it difficult to evaluate for acute superimposed lytic change.    Postsurgical and posttraumatic change at the tibia and fibula with bony demineralization.  No appreciable acute osseous abnormality.      Electronically signed by: Tracy Zamudio  Date:    06/28/2023  Time:    18:14               Narrative:    EXAMINATION:  XR FEMUR 2 VIEW RIGHT; XR TIBIA FIBULA 2 VIEW RIGHT    CLINICAL HISTORY:  possible infection;; infection;    TECHNIQUE:  AP and lateral views of the right femur were performed.    AP and lateral views of the right tibia and fibula were obtained.    COMPARISON:  Knee radiographs 06/28/2023, tibia and fibular radiographs 08/10/2022    FINDINGS:  There are postsurgical changes of ORIF at the distal femur with lateral plate and screws.  There are postsurgical changes of ORIF at the tibia with  antegrade intramedullary raven and proximal and distal interlocking screws.  Hardware appears intact.  No acute fracture seen.  There are old, healed fracture deformities.    There is chronic remodeling at the distal femur with heterogeneous appearance of the marrow and cortex distally.  There are areas of lucency as well as sclerosis.  There is no imaging through the distal femur available for comparison to evaluate for acute lytic changes superimposed on chronic background posttraumatic and postsurgical change.  Older prior radiographs of the tibia and fibula do not adequately imaged the area.  The bones are demineralized.    There is soft tissue swelling at the knee.  There is soft tissue swelling at the ankle.                                       X-Ray Knee 3 View Right (Final result)  Result time 06/28/23 14:18:03      Final result by Lanette Waller MD (06/28/23 14:18:03)                   Impression:      1. No acute bony abnormality.  2. Soft tissue swelling around the knee.      Electronically signed by: Lanette Waller  Date:    06/28/2023  Time:    14:18               Narrative:    EXAMINATION:  XR KNEE 3 VIEW RIGHT    CLINICAL HISTORY:  Effusion, right knee    COMPARISON:  None.    FINDINGS:  There has been prior internal fixation of the femur and tibia.  There are chronic changes of the bones with heterotopic ossification around the knee.  There is no acute fracture identified.  There is soft tissue swelling around the knee.                                       Lab Review   Recent Results (from the past 24 hour(s))   Comprehensive Metabolic Panel    Collection Time: 07/05/23  1:38 AM   Result Value Ref Range    Sodium Level 128 (L) 136 - 145 mmol/L    Potassium Level 5.3 (H) 3.5 - 5.1 mmol/L    Chloride 93 (L) 98 - 107 mmol/L    Carbon Dioxide 20 (L) 22 - 29 mmol/L    Glucose Level 78 74 - 100 mg/dL    Blood Urea Nitrogen 39.8 (H) 8.4 - 25.7 mg/dL    Creatinine 7.07 (H) 0.73 - 1.18 mg/dL    Calcium  Level Total 8.1 (L) 8.4 - 10.2 mg/dL    Protein Total 6.0 (L) 6.4 - 8.3 gm/dL    Albumin Level 2.1 (L) 3.5 - 5.0 g/dL    Globulin 3.9 (H) 2.4 - 3.5 gm/dL    Albumin/Globulin Ratio 0.5 (L) 1.1 - 2.0 ratio    Bilirubin Total 0.4 <=1.5 mg/dL    Alkaline Phosphatase 60 40 - 150 unit/L    Alanine Aminotransferase 7 0 - 55 unit/L    Aspartate Aminotransferase 20 5 - 34 unit/L    eGFR 8 mls/min/1.73/m2   Magnesium    Collection Time: 07/05/23  1:38 AM   Result Value Ref Range    Magnesium Level 2.00 1.60 - 2.60 mg/dL   Phosphorus    Collection Time: 07/05/23  1:38 AM   Result Value Ref Range    Phosphorus Level 7.1 (H) 2.3 - 4.7 mg/dL   CBC with Differential    Collection Time: 07/05/23  1:38 AM   Result Value Ref Range    WBC 10.45 4.50 - 11.50 x10(3)/mcL    RBC 3.55 (L) 4.70 - 6.10 x10(6)/mcL    Hgb 8.5 (L) 14.0 - 18.0 g/dL    Hct 27.6 (L) 42.0 - 52.0 %    MCV 77.7 (L) 80.0 - 94.0 fL    MCH 23.9 (L) 27.0 - 31.0 pg    MCHC 30.8 (L) 33.0 - 36.0 g/dL    RDW 18.0 (H) 11.5 - 17.0 %    Platelet 530 (H) 130 - 400 x10(3)/mcL    MPV 10.0 7.4 - 10.4 fL    Neut % 79.2 %    Lymph % 12.2 %    Mono % 7.1 %    Eos % 0.3 %    Basophil % 0.2 %    Lymph # 1.28 0.6 - 4.6 x10(3)/mcL    Neut # 8.28 2.1 - 9.2 x10(3)/mcL    Mono # 0.74 0.1 - 1.3 x10(3)/mcL    Eos # 0.03 0 - 0.9 x10(3)/mcL    Baso # 0.02 <=0.2 x10(3)/mcL    IG# 0.10 (H) 0 - 0.04 x10(3)/mcL    IG% 1.0 %    NRBC% 0.0 %   BNP    Collection Time: 07/05/23  2:28 AM   Result Value Ref Range    Natriuretic Peptide 198.9 (H) <=100.0 pg/mL   Blood Gas (ABG)    Collection Time: 07/05/23  5:50 AM   Result Value Ref Range    Sample Type Arterial Blood     Sample site Right Radial Artery     Drawn by sd rrt     pH, Blood gas 7.430 7.350 - 7.450    pCO2, Blood gas 48.0 (H) 35.0 - 45.0 mmHg    pO2, Blood gas 79.0 (L) 80.0 - 100.0 mmHg    Sodium, Blood Gas 124 (L) 137 - 145 mmol/L    Potassium, Blood Gas 4.5 3.5 - 5.0 mmol/L    Calcium Level Ionized 1.05 (L) 1.12 - 1.23 mmol/L    TOC2, Blood  gas 33.4 mmol/L    Base Excess, Blood gas 6.50 mmol/L    sO2, Blood gas 96.0 %    HCO3, Blood gas 31.9 >=15.0 mmol/L    Allens Test Yes     MODE SIMV     Oxygen Device, Blood gas Ventilator     FIO2, Blood gas 35 %    Mech Vt 450 ml    Mech RR 10 b/min    PS 10.0 cmH2O             Assessment/Plan:  1. SIRS with fevers  2. Group B Streptococcus Right knee prepatellar abscess  3. Group B Streptococcus Right knee septic arthritis  4.  Anemia/reactive thrombocytosis  5.  Paraplegia secondary to T-spine cord injury  6. History of hepatitis-C   7. Chronic right ankle wound/osteomyelitis  8.  Diabetes    9.  Acute kidney injury  10. Acute hypoxic respiratory failure  11. Pulmonary edema with pleural effusions/ Pneumonitis      -Continue Merrem #2 and maintain chronic suppressive doxycycline    -No fevers noted, isolated temp of 94.6° noted and now with slight leukocytosis and worse thrombocytosis, follow  -7/4 blood cultures remain negative and sputum culture with moderate yeast  -7/5 CXR with b/l lung opacities with mild worsening of the right, follow  -7/4 CT scan of the abdomen and pelvis and 7/3 chest x-ray results noted, likely dealing with pulmonary edema with pleural effusions and possibly superimposed infectious pneumonitis. We will get procalcitonin level for further evaluation  -6/28 right knee abscess culture with Group B Streptococcus  -Seen by Orthopedic surgery team calm s/p I&D of R prepatellar bursa abscess and septic R knee with cultures yielding Group G Streptococcus  -6/28 blood cultures negative  -Multiple comorbidities, paraplegic with chronic pressure wounds, right ankle osteomyelitis with exposed bone on chronic suppressive doxycycline  -Plan for a long-term antibiotic coverage, about a 6 week course for GBS septic arthritis, following inflammatory markers as well as maintaining on chronic suppressive antibiotics with doxycycline  -We will continue surgical site care per Orthopedic surgery team  -We  will continue wound care  -He is to continue management of his hepatitis-C as outpatient  -Renal impairment noted, HD per Nephrology, started 7/4  -7/3 Renal ultrasound results noted  -Continue ventilator management and ICU support per intensivist  -Discussed with nursing staff.

## 2023-07-06 NOTE — PROGRESS NOTES
UROLOGY  PROGRESS  NOTE    Bianca Khan 1965  [id number]  7/6/2023    Called by nursing d/t leaking around SPT  Patient intubated, ICU preparing to bronch  Continues with decreased UOP    Afebrile  100ml UOP overnight; 125 total over last 24 hrs   BUN/Cr 31.2/5.54 (trending down)    Intake/Output:  I/O this shift:  In: -   Out: 1550 [Urine:50; Other:1500]  I/O last 3 completed shifts:  In: 2453.2 [P.O.:250; I.V.:1129.2; NG/GT:674; IV Piggyback:400]  Out: 2325 [Urine:125; Other:2200]     Exam:    sedated  Card: RRR  Resp: intubated on mechanical ventilation  Abd: soft, ND  : minimal light yellow urine with sediment draining from SPT; SPT flushed and irrigation leaked around SPT site.  Tube exchanged for 22Fr without difficulty, Flushes easily and no evidence of leakage around catheter.       Recent Results (from the past 24 hour(s))   Comprehensive Metabolic Panel    Collection Time: 07/06/23  1:49 AM   Result Value Ref Range    Sodium Level 129 (L) 136 - 145 mmol/L    Potassium Level 4.7 3.5 - 5.1 mmol/L    Chloride 96 (L) 98 - 107 mmol/L    Carbon Dioxide 25 22 - 29 mmol/L    Glucose Level 111 (H) 74 - 100 mg/dL    Blood Urea Nitrogen 31.2 (H) 8.4 - 25.7 mg/dL    Creatinine 5.54 (H) 0.73 - 1.18 mg/dL    Calcium Level Total 8.1 (L) 8.4 - 10.2 mg/dL    Protein Total 6.1 (L) 6.4 - 8.3 gm/dL    Albumin Level 2.1 (L) 3.5 - 5.0 g/dL    Globulin 4.0 (H) 2.4 - 3.5 gm/dL    Albumin/Globulin Ratio 0.5 (L) 1.1 - 2.0 ratio    Bilirubin Total 0.3 <=1.5 mg/dL    Alkaline Phosphatase 70 40 - 150 unit/L    Alanine Aminotransferase 7 0 - 55 unit/L    Aspartate Aminotransferase 20 5 - 34 unit/L    eGFR 11 mls/min/1.73/m2   Magnesium    Collection Time: 07/06/23  1:49 AM   Result Value Ref Range    Magnesium Level 2.00 1.60 - 2.60 mg/dL   CBC with Differential    Collection Time: 07/06/23  1:49 AM   Result Value Ref Range    WBC 12.05 (H) 4.50 - 11.50 x10(3)/mcL    RBC 3.19 (L) 4.70 - 6.10 x10(6)/mcL    Hgb 7.8 (L) 14.0 - 18.0  g/dL    Hct 24.2 (L) 42.0 - 52.0 %    MCV 75.9 (L) 80.0 - 94.0 fL    MCH 24.5 (L) 27.0 - 31.0 pg    MCHC 32.2 (L) 33.0 - 36.0 g/dL    RDW 17.8 (H) 11.5 - 17.0 %    Platelet 629 (H) 130 - 400 x10(3)/mcL    MPV 9.9 7.4 - 10.4 fL    Neut % 82.5 %    Lymph % 10.7 %    Mono % 5.2 %    Eos % 0.3 %    Basophil % 0.2 %    Lymph # 1.29 0.6 - 4.6 x10(3)/mcL    Neut # 9.94 (H) 2.1 - 9.2 x10(3)/mcL    Mono # 0.63 0.1 - 1.3 x10(3)/mcL    Eos # 0.04 0 - 0.9 x10(3)/mcL    Baso # 0.02 <=0.2 x10(3)/mcL    IG# 0.13 (H) 0 - 0.04 x10(3)/mcL    IG% 1.1 %    NRBC% 0.0 %   Blood Gas (ABG)    Collection Time: 07/06/23  5:55 AM   Result Value Ref Range    Sample Type Arterial Blood     Sample site Left Radial Artery     Drawn by sd rrt     pH, Blood gas 7.420 7.350 - 7.450    pCO2, Blood gas 43.0 35.0 - 45.0 mmHg    pO2, Blood gas 69.0 (L) 80.0 - 100.0 mmHg    Sodium, Blood Gas 126 (L) 137 - 145 mmol/L    Potassium, Blood Gas 4.1 3.5 - 5.0 mmol/L    Calcium Level Ionized 1.11 (L) 1.12 - 1.23 mmol/L    TOC2, Blood gas 29.2 mmol/L    Base Excess, Blood gas 3.00 mmol/L    sO2, Blood gas 94.0 %    HCO3, Blood gas 27.9 >=15.0 mmol/L    Allens Test Yes     MODE AC     Oxygen Device, Blood gas Ventilator     FIO2, Blood gas 50 %    Mech Vt 450 ml    Mech RR 10 b/min    PEEP 10.0 cmH2O   RT Blood Gas    Collection Time: 07/06/23  2:36 PM   Result Value Ref Range    Sample Type Arterial Blood     Sample site Arterial Line     Drawn by AQUINO,RRT     pH, Blood gas 7.400 7.350 - 7.450    pCO2, Blood gas 46.0 (H) 35.0 - 45.0 mmHg    pO2, Blood gas 68.0 (L) 80.0 - 100.0 mmHg    Sodium, Blood Gas 128 (L) 137 - 145 mmol/L    Potassium, Blood Gas 4.1 3.5 - 5.0 mmol/L    Calcium Level Ionized 1.07 (L) 1.12 - 1.23 mmol/L    TOC2, Blood gas 29.9 mmol/L    Base Excess, Blood gas 3.10 (H) -2.00 - 2.00 mmol/L    sO2, Blood gas 93.0 %    HCO3, Blood gas 28.5 (H) 22.0 - 26.0 mmol/L    Allens Test N/A     MODE AC     FIO2, Blood gas 60 %    Mech Vt 450 ml    Mech RR  10 b/min    PEEP 10.0 cmH2O       Assessment:  Neurogenic bladder s/t paraplegia with chronic SP tube  ANTON with initiation of HD on 7/4/2023  Respiratory failure  Septic arthritis of right knee s/p I&D    Plan:  -SPT exchanged without difficulty. No longer leaking around catheter when it is flushed, continues with minimal UOP.   -Please call as needed with any issues.         Kristin Quinn, PITO

## 2023-07-06 NOTE — PROGRESS NOTES
Patient adamantly against above-the-knee amputation due to hardware infection prior to intubation.  Currently after discussion with the critical care physicians  we do not suspect that this infection is leading to his poor health at this time.  He has wound VAC changes and IV antibiotics and the infection appears to be suppressed.  Ortho Trauma will continue to follow peripherally for possible amputation.    Ac

## 2023-07-06 NOTE — NURSING
Nurses Note -- 4 Eyes      7/6/2023   2:48 AM      Skin assessed during: Q Shift Change      [] No Altered Skin Integrity Present    [x]Prevention Measures Documented      [x] Yes- Altered Skin Integrity Present or Discovered   [] LDA Added if Not in Epic (Describe Wound)   [] New Altered Skin Integrity was Present on Admit and Documented in LDA   [] Wound Image Taken    Wound Care Consulted? Yes    Attending Nurse:  Amy Ureña RN     Second RN/Staff Member:  Sukhwinder Alonso RN

## 2023-07-07 LAB
ABO + RH BLD: NORMAL
ABO + RH BLD: NORMAL
ALBUMIN SERPL-MCNC: 1.9 G/DL (ref 3.5–5)
ALBUMIN/GLOB SERPL: 0.5 RATIO (ref 1.1–2)
ALLENS TEST BLOOD GAS (OHS): YES
ALP SERPL-CCNC: 84 UNIT/L (ref 40–150)
ALT SERPL-CCNC: 7 UNIT/L (ref 0–55)
AST SERPL-CCNC: 22 UNIT/L (ref 5–34)
BASE EXCESS BLD CALC-SCNC: 2.7 MMOL/L (ref -2–2)
BASOPHILS # BLD AUTO: 0.02 X10(3)/MCL
BASOPHILS NFR BLD AUTO: 0.2 %
BILIRUBIN DIRECT+TOT PNL SERPL-MCNC: 0.3 MG/DL
BLD PROD TYP BPU: NORMAL
BLD PROD TYP BPU: NORMAL
BLOOD GAS SAMPLE TYPE (OHS): ABNORMAL
BLOOD UNIT EXPIRATION DATE: NORMAL
BLOOD UNIT EXPIRATION DATE: NORMAL
BLOOD UNIT TYPE CODE: 5100
BLOOD UNIT TYPE CODE: 5100
BNP BLD-MCNC: 608.7 PG/ML
BUN SERPL-MCNC: 40.1 MG/DL (ref 8.4–25.7)
CA-I BLD-SCNC: 1.09 MMOL/L (ref 1.12–1.23)
CALCIUM SERPL-MCNC: 7.8 MG/DL (ref 8.4–10.2)
CHLORIDE SERPL-SCNC: 98 MMOL/L (ref 98–107)
CO2 BLDA-SCNC: 29.8 MMOL/L
CO2 SERPL-SCNC: 23 MMOL/L (ref 22–29)
COHGB MFR BLDA: 1.5 % (ref 0.5–1.5)
CREAT SERPL-MCNC: 4.55 MG/DL (ref 0.73–1.18)
CROSSMATCH INTERPRETATION: NORMAL
CROSSMATCH INTERPRETATION: NORMAL
DISPENSE STATUS: NORMAL
DISPENSE STATUS: NORMAL
DRAWN BY BLOOD GAS (OHS): ABNORMAL
EOSINOPHIL # BLD AUTO: 0.01 X10(3)/MCL (ref 0–0.9)
EOSINOPHIL NFR BLD AUTO: 0.1 %
ERYTHROCYTE [DISTWIDTH] IN BLOOD BY AUTOMATED COUNT: 18 % (ref 11.5–17)
FIO2 BLOOD GAS (OHS): 45 %
GFR SERPLBLD CREATININE-BSD FMLA CKD-EPI: 14 MLS/MIN/1.73/M2
GLOBULIN SER-MCNC: 4.2 GM/DL (ref 2.4–3.5)
GLUCOSE SERPL-MCNC: 96 MG/DL (ref 74–100)
GROUP & RH: NORMAL
HCO3 BLDA-SCNC: 28.3 MMOL/L (ref 22–26)
HCT VFR BLD AUTO: 22.4 % (ref 42–52)
HGB BLD-MCNC: 7.1 G/DL (ref 14–18)
IMM GRANULOCYTES # BLD AUTO: 0.19 X10(3)/MCL (ref 0–0.04)
IMM GRANULOCYTES NFR BLD AUTO: 1.5 %
INDIRECT COOMBS GEL: NORMAL
LYMPHOCYTES # BLD AUTO: 1.22 X10(3)/MCL (ref 0.6–4.6)
LYMPHOCYTES NFR BLD AUTO: 9.3 %
MCH RBC QN AUTO: 23.7 PG (ref 27–31)
MCHC RBC AUTO-ENTMCNC: 31.7 G/DL (ref 33–36)
MCV RBC AUTO: 74.9 FL (ref 80–94)
MECH RR (OHS): 10 B/MIN
MECH VT (OHS): 450 ML
METHGB MFR BLDA: 1.2 % (ref 0.4–1.5)
MODE (OHS): AC
MONOCYTES # BLD AUTO: 0.76 X10(3)/MCL (ref 0.1–1.3)
MONOCYTES NFR BLD AUTO: 5.8 %
NEUTROPHILS # BLD AUTO: 10.88 X10(3)/MCL (ref 2.1–9.2)
NEUTROPHILS NFR BLD AUTO: 83.1 %
NRBC BLD AUTO-RTO: 0 %
O2 HB BLOOD GAS (OHS): 92.7 % (ref 94–97)
OXYGEN DEVICE BLOOD GAS (OHS): ABNORMAL
OXYHGB MFR BLDA: 6.8 G/DL (ref 12–16)
PCO2 BLDA: 49 MMHG (ref 35–45)
PEEP (OHS): 10 CMH2O
PH BLDA: 7.37 [PH] (ref 7.35–7.45)
PHOSPHATE SERPL-MCNC: 5.8 MG/DL (ref 2.3–4.7)
PLATELET # BLD AUTO: 557 X10(3)/MCL (ref 130–400)
PMV BLD AUTO: 9.8 FL (ref 7.4–10.4)
PO2 BLDA: 70 MMHG (ref 80–100)
POTASSIUM BLOOD GAS (OHS): 4.7 MMOL/L (ref 3.5–5)
POTASSIUM SERPL-SCNC: 4.9 MMOL/L (ref 3.5–5.1)
PROCALCITONIN SERPL-MCNC: 0.74 NG/ML
PROT SERPL-MCNC: 6.1 GM/DL (ref 6.4–8.3)
RBC # BLD AUTO: 2.99 X10(6)/MCL (ref 4.7–6.1)
SAMPLE SITE BLOOD GAS (OHS): ABNORMAL
SAO2 % BLDA: 93.3 %
SODIUM BLOOD GAS (OHS): 128 MMOL/L (ref 137–145)
SODIUM SERPL-SCNC: 131 MMOL/L (ref 136–145)
SPECIMEN OUTDATE: NORMAL
UNIT NUMBER: NORMAL
UNIT NUMBER: NORMAL
WBC # SPEC AUTO: 13.08 X10(3)/MCL (ref 4.5–11.5)

## 2023-07-07 PROCEDURE — 82803 BLOOD GASES ANY COMBINATION: CPT

## 2023-07-07 PROCEDURE — 94003 VENT MGMT INPAT SUBQ DAY: CPT

## 2023-07-07 PROCEDURE — P9016 RBC LEUKOCYTES REDUCED: HCPCS

## 2023-07-07 PROCEDURE — 99900035 HC TECH TIME PER 15 MIN (STAT)

## 2023-07-07 PROCEDURE — 20000000 HC ICU ROOM

## 2023-07-07 PROCEDURE — 80100014 HC HEMODIALYSIS 1:1

## 2023-07-07 PROCEDURE — 63600175 PHARM REV CODE 636 W HCPCS: Performed by: STUDENT IN AN ORGANIZED HEALTH CARE EDUCATION/TRAINING PROGRAM

## 2023-07-07 PROCEDURE — 25000003 PHARM REV CODE 250: Performed by: INTERNAL MEDICINE

## 2023-07-07 PROCEDURE — 99900026 HC AIRWAY MAINTENANCE (STAT)

## 2023-07-07 PROCEDURE — 80053 COMPREHEN METABOLIC PANEL: CPT | Performed by: STUDENT IN AN ORGANIZED HEALTH CARE EDUCATION/TRAINING PROGRAM

## 2023-07-07 PROCEDURE — 84100 ASSAY OF PHOSPHORUS: CPT | Performed by: INTERNAL MEDICINE

## 2023-07-07 PROCEDURE — 27200966 HC CLOSED SUCTION SYSTEM

## 2023-07-07 PROCEDURE — 36600 WITHDRAWAL OF ARTERIAL BLOOD: CPT

## 2023-07-07 PROCEDURE — 99900031 HC PATIENT EDUCATION (STAT)

## 2023-07-07 PROCEDURE — 25000242 PHARM REV CODE 250 ALT 637 W/ HCPCS: Performed by: INTERNAL MEDICINE

## 2023-07-07 PROCEDURE — 27000221 HC OXYGEN, UP TO 24 HOURS

## 2023-07-07 PROCEDURE — 86900 BLOOD TYPING SEROLOGIC ABO: CPT

## 2023-07-07 PROCEDURE — 94640 AIRWAY INHALATION TREATMENT: CPT

## 2023-07-07 PROCEDURE — 63600175 PHARM REV CODE 636 W HCPCS: Performed by: INTERNAL MEDICINE

## 2023-07-07 PROCEDURE — 86923 COMPATIBILITY TEST ELECTRIC: CPT | Mod: 91

## 2023-07-07 PROCEDURE — 94761 N-INVAS EAR/PLS OXIMETRY MLT: CPT

## 2023-07-07 PROCEDURE — 25000003 PHARM REV CODE 250: Performed by: STUDENT IN AN ORGANIZED HEALTH CARE EDUCATION/TRAINING PROGRAM

## 2023-07-07 PROCEDURE — 25000003 PHARM REV CODE 250

## 2023-07-07 PROCEDURE — 85025 COMPLETE CBC W/AUTO DIFF WBC: CPT | Performed by: STUDENT IN AN ORGANIZED HEALTH CARE EDUCATION/TRAINING PROGRAM

## 2023-07-07 PROCEDURE — 83880 ASSAY OF NATRIURETIC PEPTIDE: CPT | Performed by: INTERNAL MEDICINE

## 2023-07-07 RX ORDER — LACTULOSE 10 G/15ML
15 SOLUTION ORAL ONCE
Status: COMPLETED | OUTPATIENT
Start: 2023-07-07 | End: 2023-07-07

## 2023-07-07 RX ORDER — SODIUM CHLORIDE 9 MG/ML
INJECTION, SOLUTION INTRAVENOUS
Status: CANCELLED | OUTPATIENT
Start: 2023-07-07

## 2023-07-07 RX ORDER — SODIUM CHLORIDE 9 MG/ML
INJECTION, SOLUTION INTRAVENOUS ONCE
Status: CANCELLED | OUTPATIENT
Start: 2023-07-07 | End: 2023-07-07

## 2023-07-07 RX ORDER — SODIUM CHLORIDE FOR INHALATION 3 %
4 VIAL, NEBULIZER (ML) INHALATION EVERY 4 HOURS
Status: DISCONTINUED | OUTPATIENT
Start: 2023-07-07 | End: 2023-07-11

## 2023-07-07 RX ORDER — HYDROCODONE BITARTRATE AND ACETAMINOPHEN 500; 5 MG/1; MG/1
TABLET ORAL
Status: DISCONTINUED | OUTPATIENT
Start: 2023-07-07 | End: 2023-08-09 | Stop reason: HOSPADM

## 2023-07-07 RX ADMIN — SODIUM CHLORIDE SOLN NEBU 3% 4 ML: 3 NEBU SOLN at 08:07

## 2023-07-07 RX ADMIN — ATORVASTATIN CALCIUM 20 MG: 10 TABLET, FILM COATED ORAL at 08:07

## 2023-07-07 RX ADMIN — GUAIFENESIN 400 MG: 200 SOLUTION ORAL at 02:07

## 2023-07-07 RX ADMIN — PROPOFOL 30 MCG/KG/MIN: 10 INJECTION, EMULSION INTRAVENOUS at 08:07

## 2023-07-07 RX ADMIN — SODIUM BICARBONATE 650 MG TABLET 650 MG: at 11:07

## 2023-07-07 RX ADMIN — DEXMEDETOMIDINE HYDROCHLORIDE 1.4 MCG/KG/HR: 400 INJECTION INTRAVENOUS at 08:07

## 2023-07-07 RX ADMIN — GUAIFENESIN 400 MG: 200 SOLUTION ORAL at 08:07

## 2023-07-07 RX ADMIN — OXYBUTYNIN CHLORIDE 5 MG: 5 TABLET ORAL at 11:07

## 2023-07-07 RX ADMIN — DEXMEDETOMIDINE HYDROCHLORIDE 0.4 MCG/KG/HR: 400 INJECTION INTRAVENOUS at 03:07

## 2023-07-07 RX ADMIN — GUAIFENESIN 400 MG: 200 SOLUTION ORAL at 11:07

## 2023-07-07 RX ADMIN — PROPOFOL 50 MCG/KG/MIN: 10 INJECTION, EMULSION INTRAVENOUS at 06:07

## 2023-07-07 RX ADMIN — DOXYCYCLINE HYCLATE 100 MG: 100 TABLET, COATED ORAL at 11:07

## 2023-07-07 RX ADMIN — MEROPENEM 1 G: 1 INJECTION, POWDER, FOR SOLUTION INTRAVENOUS at 08:07

## 2023-07-07 RX ADMIN — MICONAZOLE NITRATE 2 % TOPICAL POWDER: at 11:07

## 2023-07-07 RX ADMIN — MUPIROCIN: 20 OINTMENT TOPICAL at 08:07

## 2023-07-07 RX ADMIN — ENOXAPARIN SODIUM 30 MG: 30 INJECTION SUBCUTANEOUS at 04:07

## 2023-07-07 RX ADMIN — GUAIFENESIN 400 MG: 200 SOLUTION ORAL at 05:07

## 2023-07-07 RX ADMIN — MICONAZOLE NITRATE 2 % TOPICAL POWDER: at 08:07

## 2023-07-07 RX ADMIN — MEROPENEM 1 G: 1 INJECTION, POWDER, FOR SOLUTION INTRAVENOUS at 11:07

## 2023-07-07 RX ADMIN — DEXMEDETOMIDINE HYDROCHLORIDE 1.4 MCG/KG/HR: 400 INJECTION INTRAVENOUS at 02:07

## 2023-07-07 RX ADMIN — FENOFIBRATE 48 MG: 48 TABLET, FILM COATED ORAL at 11:07

## 2023-07-07 RX ADMIN — PROPOFOL 50 MCG/KG/MIN: 10 INJECTION, EMULSION INTRAVENOUS at 03:07

## 2023-07-07 RX ADMIN — DEXMEDETOMIDINE HYDROCHLORIDE 1.4 MCG/KG/HR: 400 INJECTION INTRAVENOUS at 07:07

## 2023-07-07 RX ADMIN — CARVEDILOL 6.25 MG: 3.12 TABLET, FILM COATED ORAL at 08:07

## 2023-07-07 RX ADMIN — MUPIROCIN: 20 OINTMENT TOPICAL at 11:07

## 2023-07-07 RX ADMIN — FAMOTIDINE 20 MG: 10 INJECTION, SOLUTION INTRAVENOUS at 11:07

## 2023-07-07 RX ADMIN — FUROSEMIDE 40 MG: 10 INJECTION, SOLUTION INTRAMUSCULAR; INTRAVENOUS at 05:07

## 2023-07-07 RX ADMIN — OXYBUTYNIN CHLORIDE 5 MG: 5 TABLET ORAL at 08:07

## 2023-07-07 RX ADMIN — FUROSEMIDE 40 MG: 10 INJECTION, SOLUTION INTRAMUSCULAR; INTRAVENOUS at 11:07

## 2023-07-07 RX ADMIN — DOXYCYCLINE HYCLATE 100 MG: 100 TABLET, COATED ORAL at 08:07

## 2023-07-07 RX ADMIN — LACTULOSE 15 G: 10 SOLUTION ORAL at 02:07

## 2023-07-07 RX ADMIN — SODIUM BICARBONATE 650 MG TABLET 650 MG: at 08:07

## 2023-07-07 NOTE — PROGRESS NOTES
Inpatient Nutrition Assessment    Admit Date: 6/28/2023   Total duration of encounter: 9 days     Nutrition Recommendation/Prescription     Tube feeding recommendation:   Novasource Renal goal rate 35 ml/hr + 4 packets ProSource NoCarb daily to provide  1640 kcal/d (96% est needs, 116% with meds)  123 g protein/d (95% est needs)  504 ml free water/d  (calculations based on estimated 20 hr/d run time)     Also add Malachi (provides 90 kcal, 2.5 g protein per serving) BID.    Communication of Recommendations: reviewed with nurse    Nutrition Assessment     Malnutrition Assessment/Nutrition-Focused Physical Exam    Malnutrition Context: chronic illness  Malnutrition Level:  (does not meet criteria)  Energy Intake (Malnutrition):  (unable to obtain)  Weight Loss (Malnutrition):  (unable to obtain)  Subcutaneous Fat (Malnutrition):  (does not meet criteria)           Muscle Mass (Malnutrition):  (does not meet criteria)                          Fluid Accumulation (Malnutrition):  (does not meet criteria)        A minimum of two characteristics is recommended for diagnosis of either severe or non-severe malnutrition.    Chart Review    Reason Seen: continuous nutrition monitoring and follow-up    Malnutrition Screening Tool Results   Have you recently lost weight without trying?: Unsure  Have you been eating poorly because of a decreased appetite?: Yes   MST Score: 3     Diagnosis:  Bilateral Pulmonary Infiltrates   Uremic encephalopathy 2/2 acute renal failure  Acute renal failure  Hyponatremia  Septic arthritis    Relevant Medical History: paraplegia 2/2 spinal cord injury (T1-T6), neurogenic bladder with suprapubic catheter, chronic right ankle osteomyelitis, DM II, HTN    Nutrition-Related Medications: diprivan, furosemide  Calorie Containing IV Medications: Diprivan @ 13 ml/hr (provides 340 kcal/d)    Nutrition-Related Labs:  7/5 Na 128, K 5.3, Glu 93, BUN 39.8, Crea 7.07, Phos 7.1, GFR 8  7/7 Na 131, BUN 40.1, Crea  "4.55, Phos 5.8    Diet/PN Order: No diet orders on file  Oral Supplement Order: none  Tube Feeding Order:  Novasource Renal (see below for calculation)  Appetite/Oral Intake: not applicable/not applicable  Factors Affecting Nutritional Intake: on mechanical ventilation  Food/Jainism/Cultural Preferences: unable to obtain  Food Allergies: none reported    Skin Integrity: drain/device(s)  Wound(s):      Altered Skin Integrity 06/28/23 2230 medial Sacral spine #1-Tissue loss description: Partial thickness       Altered Skin Integrity 06/28/23 2230 Left posterior Buttocks #4-Tissue loss description: Partial thickness       Altered Skin Integrity 06/28/23 2230 Right lateral Ankle #6-Tissue loss description: Full thickness     Comments    7/5/23: Discussed with RN. Will provide tube feeding recommendations for when appropriate to start tube feeding. Receiving kcal from meds. Noted K and Phos, will need renal formula at this time. Will add supplemental protein and Malachi for wound healing.     7/7/23: TF continues. Per RN, pt with ~500ml residual last PM, TF held. Now restarted this AM. Receiving kcal from meds    Anthropometrics    Height: 5' 9.69" (177 cm) Height Method: Stated  Last Weight: 86.2 kg (190 lb) (07/04/23 1114) Weight Method: Standard Scale (stated)  BMI (Calculated): 27.5  BMI Classification: overweight (BMI 25-29.9)        Ideal Body Weight (IBW), Male: 164.14 lb     % Ideal Body Weight, Male (lb): 115.75 %                          Usual Weight Provided By: unable to obtain usual weight    Wt Readings from Last 5 Encounters:   07/04/23 86.2 kg (190 lb)   09/13/22 86.2 kg (190 lb)   08/25/22 86.2 kg (190 lb 0.6 oz)   01/04/22 86.2 kg (189 lb 15.9 oz)     Weight Change(s) Since Admission:  Admit Weight: 86.2 kg (190 lb) (06/28/23 1346)    Estimated Needs    Weight Used For Calorie Calculations: 86.2 kg (190 lb 0.6 oz)  Energy Calorie Requirements (kcal): 1714kcal  Energy Need Method: [facility]  Weight Used " For Protein Calculations: 86.2 kg (190 lb 0.6 oz)  Protein Requirements: 130gm (1.5g/kg)  Fluid Requirements (mL): 1000ml + urinary output  Temp (24hrs), Av.8 °F (36.6 °C), Min:96.6 °F (35.9 °C), Max:99 °F (37.2 °C)    Vtot (L/Min) for [place] Equation Calculation: 7.1    Enteral Nutrition    Formula: Novasource Renal  Rate/Volume: 35ml/hr  Water Flushes: 50ml q4hr  Additives/Modulars: Prosource No Carb  Route: orogastric tube  Method: continuous  Total Nutrition Provided by Tube Feeding, Additives, and Flushes:  Calories Provided  1640 kcal/d, 96% needs   Protein Provided  123 g/d, 95% needs   Fluid Provided  504 ml/d, N/A% needs   Continuous feeding calculations based on estimated 20 hr/d run time unless otherwise stated.    Parenteral Nutrition    Patient not receiving parenteral nutrition support at this time.    Evaluation of Received Nutrient Intake    Calories: meeting estimated needs  Protein: meeting estimated needs    Patient Education    Not applicable.    Nutrition Diagnosis     PES: Inadequate oral intake related to acute illness as evidenced by intubation since 23. (continues)    Interventions/Goals     Intervention(s): modified composition of enteral nutrition, modified rate of enteral nutrition, and collaboration with other providers  Goal: Meet greater than 75% of nutritional needs by follow-up. (goal progressing)    Monitoring & Evaluation     Dietitian will monitor energy intake.  Nutrition Risk/Follow-Up: high (follow-up in 1-4 days)   Please consult if re-assessment needed sooner.

## 2023-07-07 NOTE — PROGRESS NOTES
[facility] - [facility]  Pulmonary Critical Care Note    Patient Name: Bianca Khan  MRN: [id number]  Admission Date: 6/28/2023  Hospital Length of Stay: 9 days  Code Status: Full Code  Attending Provider: Khris Treadwell Jr., MD, *  Primary Care Provider: Areli Vargas PA-C     Subjective:     HPI:   Bianca Khan is a 58 y.o. male with PMH of paraplegia 2/2 spinal cord injury (T1-T6), neurogenic bladder with suprapubic catheter, chronic right ankle osteomyelitis, DM II, HTN, who presented to the ED on 6/28/2023 with CC of right knee pain. Per chart review, CT of right knee revealed 5 cm area of subcutaneous fluid in prepatellar region which was aspirated in the ED; he was taken to the OR on 6/29/2023 by orthopedic surgery team and was found to have prepatellar bursa infection and septic arthritis. Wound culture 6/29/2023 positive for strep agalactiae. Orthopedic surgery team recommended right-sided above-knee amputation d/t chronically infected hardware in right lower extremity. On 7/4/2023, a RRT was called d/t acute respiratory distress that was not improving despite being placed on vapotherm at 35 L/min with FiO2 100%. At the time of examination, patient's initial GCS was 10 but progressively reduced to a GCS of 6. Shared decision with patient's wife was made to intubate patient for airway protection though ABGs were within normal limits. He is admitted to the ICU for close monitoring and further medical management.    Hospital Course/Significant events:  6/29/2023 - I&D of right knee  7/4/2023 - Admitted to ICU, intubated for airway protection d/t altered mental status. Trialysis catheter placed and HD began for worsening renal function. 1 L removed via HD; required minimal levophed during HD.  7/5/2023 - 2L removed via HD without pressor support; patient remains vented, stable.  7/6/2023 - 1.3 L of fluid removed via HD (day 3). Increasing O2 req and thick secretions prompted bronchoscopy;  no significant findings in BL lungs. Suprapubic cath replaced by urology.   7/07/2023 - Hg fell to 7.1, transfused 2u PRBC with HD    24 Hour Interval History:  No acute events overnight. Labs this AM: .uptrending WBC with left shift, worsening anemia; worsening uremia; improving HypoNa and Scr. I/O: 220 mL urine output past 24 hours, net negative 0.987 L    Past Medical History:   Diagnosis Date    Arthritis     Chronic ulcer of ankle 05/26/2022    Frequent UTI 07/02/2019    Generalized anxiety disorder 05/26/2022    Neurogenic bladder 05/26/2022    Osteomyelitis 05/26/2022    Presence of suprapubic catheter 05/26/2022    Pure hypercholesterolemia 05/26/2022    Retention of urine, unspecified 08/09/2019    Spinal cord injury at T1-T6 level 04/20/2018       Past Surgical History:   Procedure Laterality Date    INCISION AND DRAINAGE, LOWER EXTREMITY Right 6/29/2023    Procedure: INCISION AND DRAINAGE, LOWER EXTREMITY;  Surgeon: Prabhu Shen DO;  Location: [facility];  Service: Orthopedics;  Laterality: Right;  supine bone foam wash stuff cultures    INSERTION OF INTRAMEDULLARY MARISA Right 8/10/2022    Procedure: INSERTION, INTRAMEDULLARY MARISA RIGHT TIBIA;  Surgeon: Jorge Orellana MD;  Location: [facility];  Service: Orthopedics;  Laterality: Right;       Social History     Socioeconomic History    Marital status:    Tobacco Use    Smoking status: Every Day     Types: Cigars, Cigarettes    Smokeless tobacco: Never   Substance and Sexual Activity    Alcohol use: Yes     Alcohol/week: 2.0 standard drinks     Types: 2 Cans of beer per week    Drug use: Not Currently    Sexual activity: Never       Current Outpatient Medications   Medication Instructions    amitriptyline (ELAVIL) 50 mg, Oral, Nightly    amLODIPine (NORVASC) 10 MG tablet 1 tablet    atorvastatin (LIPITOR) 20 mg, Oral, Nightly    busPIRone (BUSPAR) 5 MG Tab 1 tablet    carvediloL (COREG) 12.5 mg, Oral    cyclobenzaprine (FLEXERIL) 10 mg, Oral, 2 times  daily PRN    doxycycline (VIBRAMYCIN) 100 mg, Oral, Daily    fenofibrate 160 mg, Oral    FLUoxetine 20 mg, Oral    gabapentin (NEURONTIN) 300 MG capsule 1 capsule    lisinopriL 10 mg, Oral, Daily    LORazepam (ATIVAN) 1 mg, Oral, 2 times daily    melatonin (MELATIN) 3 mg tablet TAKE TWO TABLETS BY MOUTH EVERY NIGHT AT BEDTIME AS NEEDED FOR INSOMNIA.    meloxicam (MOBIC) 15 mg, Oral, Daily    metFORMIN (GLUCOPHAGE) 500 MG tablet SMARTSI Tablet(s) By Mouth Morning-Evening    oxybutynin (DITROPAN) 5 mg, Oral, 2 times daily    oxyCODONE-acetaminophen (PERCOCET)  mg per tablet 1 tablet, Oral, Every 6 hours PRN    pantoprazole (PROTONIX) 40 MG tablet 1 tablet    temazepam (RESTORIL) 30 mg, Oral, Nightly    traZODone (DESYREL) 50 mg, Oral, Nightly    XARELTO 20 mg Tab SMARTSI Tablet(s) By Mouth Every Evening     Current Inpatient Medications   amLODIPine  5 mg Oral Daily    atorvastatin  20 mg Oral Nightly    carvediloL  6.25 mg Oral BID    doxycycline  100 mg Oral Q12H    enoxaparin  30 mg Subcutaneous Daily    famotidine (PF)  20 mg Intravenous Daily    fenofibrate  48 mg Oral Daily    furosemide (LASIX) injection  40 mg Intravenous BID    guaiFENesin 100 mg/5 ml  400 mg Per NG tube Q4H    meropenem (MERREM) IVPB  1 g Intravenous Q12H    miconazole NITRATE 2 %   Topical (Top) BID    mupirocin   Nasal BID    oxybutynin  5 mg Oral BID    sodium bicarbonate  650 mg Oral BID    sodium citrate-citric acid 500-334 mg/5 ml  30 mL Oral Once     Current Intravenous Infusions   dexmedeTOMIDine (Precedex) infusion (titrating) 0.4 mcg/kg/hr (23 0308)    NORepinephrine bitartrate-D5W 0.02 mcg/kg/min (23 1315)    propofoL 50 mcg/kg/min (23 0308)       Objective:     Intake/Output Summary (Last 24 hours) at 2023 0351  Last data filed at 2023 2112  Gross per 24 hour   Intake 2065.69 ml   Output 2470 ml   Net -404.31 ml     Vital Signs (Most Recent):  Temp: 98.7 °F (37.1 °C) (23 0330)  Pulse:  76 (07/07/23 0330)  Resp: 18 (07/07/23 0330)  BP: 93/61 (07/07/23 0330)  SpO2: 97 % (07/07/23 0330)  Body mass index is 27.51 kg/m².  Weight: 86.2 kg (190 lb) Vital Signs (24h Range):  Temp:  [98.1 °F (36.7 °C)-99 °F (37.2 °C)] 98.7 °F (37.1 °C)  Pulse:  [] 76  Resp:  [15-41] 18  SpO2:  [89 %-100 %] 97 %  BP: ()/(61-91) 93/61     Physical Exam  Vitals and nursing note reviewed.   Constitutional:       Interventions: He is sedated and intubated.   HENT:      Head: Normocephalic and atraumatic.      Nose: Nose normal.   Cardiovascular:      Rate and Rhythm: Normal rate and regular rhythm.      Pulses: Normal pulses.      Heart sounds: Normal heart sounds.   Pulmonary:      Effort: He is intubated.      Breath sounds: Normal breath sounds.   Abdominal:      General: Bowel sounds are normal. There is no distension.      Palpations: Abdomen is soft.   Skin:     General: Skin is warm and dry.     Lines/Drains/Airways       Central Venous Catheter Line  Duration                  Hemodialysis Catheter 07/04/23 0900 right internal jugular 2 days              Drain  Duration                  NG/OG Tube 07/04/23 0653 Center mouth 2 days         Suprapubic Catheter 07/06/23 1544 100% silicone 22 Fr. <1 day              Airway  Duration                  Airway - Non-Surgical 07/04/23 0422 Endotracheal Tube 2 days              Peripheral Intravenous Line  Duration                  Midline Catheter Insertion/Assessment  - Single Lumen 07/02/23 2128 Right basilic vein (medial side of arm) 4 days         Peripheral IV - Single Lumen 07/04/23 0600 18 G Anterior;Left Forearm 2 days         Peripheral IV - Single Lumen 07/04/23 0600 20 G Left;Posterior Hand 2 days                  Significant Labs:  Lab Results   Component Value Date    WBC 13.08 (H) 07/07/2023    HGB 7.1 (L) 07/07/2023    HCT 22.4 (L) 07/07/2023    MCV 74.9 (L) 07/07/2023     (H) 07/07/2023       BMP  Lab Results   Component Value Date     (L)  07/07/2023    K 4.9 07/07/2023    CHLORIDE 98 07/07/2023    CO2 23 07/07/2023    BUN 40.1 (H) 07/07/2023    CREATININE 4.55 (H) 07/07/2023    CALCIUM 7.8 (L) 07/07/2023    AGAP 9.0 08/29/2022    EGFRNONAA >60 04/27/2022     ABG  Recent Labs   Lab 07/06/23  1907   PH 7.350   PO2 73.0*   HCO3 28.7*     Mechanical Ventilation Support:  Vent Mode: A/C (07/07/23 0238)  Set Rate: 10 BPM (07/07/23 0238)  Vt Set: 450 mL (07/07/23 0238)  Pressure Support: 10 cmH20 (07/05/23 0736)  PEEP/CPAP: 10 cmH20 (07/07/23 0238)  Oxygen Concentration (%): 40 (07/07/23 0238)  Peak Airway Pressure: 16 cmH20 (07/07/23 0238)  Total Ve: 7.4 L/m (07/07/23 0238)  F/VT Ratio<105 (RSBI): (!) 35.32 (07/07/23 0238)    Significant Imaging:  I have reviewed the pertinent imaging within the past 24 hours.    Assessment/Plan:     Assessment  Extensive BL pulmonary infiltrates   Ddx: infectious PNA vs ARDS vs hydrostatic/non-hydrostatic pulmonary edema   Scx shows moderate yeast   Intubated 7/04/2023  Prepatellar bursitis / septic arthritis of the right knee (Strep agalactiae)  Post I&D on 6/29/23  Ortho no longer planning planning on right above knee amputation near future d/t infection suppression.   Acute renal failure, anuric   Initiation of HD 7/04/2023  Acute encephalopathy, likely metabolic related to above, currently clouded by sedation   Reported paroxysmal atrial fibrillation with permenant pacemaker, currently in sinus rhythm   H/o chornic paraplegia at T1 2/2 spinal cordy injury, neurogenic bladder, chronic right ankle osteomyelitis, DM2, HTN    Plan  -Continue ICU level of care for ongoing monitoring and medical management  -ID, urology, orthopedics, wound care teams following, appreciate their assistance   -HD/Ultrafiltration as per nephrology   -Continue IV meropenem (day 4); continue to closely follow Cx   -Continue mechanical vent support    DVT Prophylaxis: LVX 30   GI Prophylaxis: Pepcid 20     32 minutes of critical care was time  spent personally by me on the following activities: development of treatment plan with patient or surrogate and bedside caregivers, discussions with consultants, evaluation of patient's response to treatment, examination of patient, ordering and performing treatments and interventions, ordering and review of laboratory studies, ordering and review of radiographic studies, pulse oximetry, re-evaluation of patient's condition.  This critical care time did not overlap with that of any other provider or involve time for any procedures.     Omar Reza DO PGY-1  Pulmonary Critical Care Medicine  [facility] - [facility] ICU

## 2023-07-07 NOTE — PROGRESS NOTES
Infectious Diseases Progress Note  58-year-old male with past medical history of T-spine cord injury with paraplegia, diabetes, HTN, hepatitis-C, chronic right ankle wound/osteomyelitis, was on suppressive doxycycline, known to my service, seen by us initially at [facility] and has followed with us at the office for chronic osteomyelitis, is admitted to [facility] on 06/28/2023 presenting through the ED with complaints of pain and swelling to his right knee, apparently struck his knee.  He did also report prior remote right knee surgery.  He was evaluated and noted to have no fevers initially but a temperature of 100.2° today 6/29, no leukocytosis but with thrombocytosis of up to 531 today.  .2 and ESR >130.  He is anemic with low albumin.  Blood cultures have been negative.  CT scan of the right knee noted a 5 cm area of subcutaneous fluid collection in the prepatellar region.  There was aspiration in the ER with findings of purulent fluid and cultures showing group B Streptococcus.  He was seen by the orthopedic surgery team with findings of right prepatellar bursa abscess and is status post incision and drainage of right knee septic arthritis and right prepatellar bursa abscess today 6/29 with cultures pending.    He is on Merrem.    Subjective:  Remains in the ICU, intubated on ventilator.  No new complaints, no fevers, doing about the same.      Past Medical History:   Diagnosis Date    Arthritis     Chronic ulcer of ankle 05/26/2022    Frequent UTI 07/02/2019    Generalized anxiety disorder 05/26/2022    Neurogenic bladder 05/26/2022    Osteomyelitis 05/26/2022    Presence of suprapubic catheter 05/26/2022    Pure hypercholesterolemia 05/26/2022    Retention of urine, unspecified 08/09/2019    Spinal cord injury at T1-T6 level 04/20/2018     Past Surgical History:   Procedure Laterality Date    INCISION AND DRAINAGE, LOWER EXTREMITY Right 6/29/2023    Procedure:  "INCISION AND DRAINAGE, LOWER EXTREMITY;  Surgeon: Prabhu Shen DO;  Location: [facility] OR;  Service: Orthopedics;  Laterality: Right;  supine bone foam wash stuff cultures    INSERTION OF INTRAMEDULLARY MARISA Right 8/10/2022    Procedure: INSERTION, INTRAMEDULLARY MARISA RIGHT TIBIA;  Surgeon: Jorge Orellana MD;  Location: [facility] OR;  Service: Orthopedics;  Laterality: Right;     Social History     Socioeconomic History    Marital status:    Tobacco Use    Smoking status: Every Day     Types: Cigars, Cigarettes    Smokeless tobacco: Never   Substance and Sexual Activity    Alcohol use: Yes     Alcohol/week: 2.0 standard drinks     Types: 2 Cans of beer per week    Drug use: Not Currently    Sexual activity: Never       Review of Systems   Unable to perform ROS: Intubated       Review of patient's allergies indicates:   Allergen Reactions    Baclofen Itching and Anxiety         Scheduled Meds:   amLODIPine  5 mg Oral Daily    atorvastatin  20 mg Oral Nightly    carvediloL  6.25 mg Oral BID    doxycycline  100 mg Oral Q12H    enoxaparin  30 mg Subcutaneous Daily    famotidine (PF)  20 mg Intravenous Daily    fenofibrate  48 mg Oral Daily    furosemide (LASIX) injection  40 mg Intravenous BID    guaiFENesin 100 mg/5 ml  400 mg Per NG tube Q4H    meropenem (MERREM) IVPB  1 g Intravenous Q12H    miconazole NITRATE 2 %   Topical (Top) BID    mupirocin   Nasal BID    oxybutynin  5 mg Oral BID    sodium bicarbonate  650 mg Oral BID    sodium citrate-citric acid 500-334 mg/5 ml  30 mL Oral Once     Continuous Infusions:   dexmedeTOMIDine (Precedex) infusion (titrating) 0.4 mcg/kg/hr (07/06/23 2035)    NORepinephrine bitartrate-D5W 0.02 mcg/kg/min (07/04/23 1315)    propofoL 50 mcg/kg/min (07/06/23 2011)     PRN Meds:acetaminophen, sodium chloride 0.9%, sodium chloride 0.9%    Objective:  /79   Pulse 86   Temp 98.8 °F (37.1 °C)   Resp (!) 21   Ht 5' 9.69" (1.77 m)   Wt 86.2 kg (190 lb)   SpO2 97%   BMI 27.51 kg/m² "     Physical Exam:   Physical Exam  Vitals reviewed.   Constitutional:       General: He is not in acute distress.     Appearance: He is ill-appearing.   HENT:      Head: Normocephalic and atraumatic.      Mouth/Throat:      Comments: ET tube in place  Cardiovascular:      Rate and Rhythm: Normal rate and regular rhythm.      Heart sounds: Normal heart sounds.   Pulmonary:      Effort: No respiratory distress.      Comments: Coarse breath sounds on ventilator  Abdominal:      General: Bowel sounds are normal. There is no distension.      Palpations: Abdomen is soft.      Tenderness: There is no abdominal tenderness.   Genitourinary:     Comments: Suprapubic catheter  Musculoskeletal:      Cervical back: Neck supple.      Comments: Some contracture of lower extremities   Skin:     Findings: No rash.      Comments: Right knee with wound VAC. Right ankle and left heel wounds dressed   Neurological:      Comments: Unable to fully evaluate, sedated on ventilator   Psychiatric:      Comments: Not communicative     Imaging  Imaging Results              CT Knee W W/O Contrast Right (Final result)  Result time 06/28/23 18:37:42      Final result by Gustavo Cassidy MD (06/28/23 18:37:42)                   Impression:      Mildly limited assessment.  5 cm area of subcutaneous fluid in the prepatellar region may have thin peripheral enhancement.  Fluid collection is possible.    Subcutaneous edema in the calf.      Electronically signed by: Gustavo Cassidy  Date:    06/28/2023  Time:    18:37               Narrative:    EXAMINATION:  CT KNEE W W/O CONTRAST RIGHT    CLINICAL HISTORY:  possible infection;    TECHNIQUE:  CT imaging of the right knee before and after IV contrast.  Axial, coronal and sagittal reformatted images reviewed.   Dose length product is 585 mGycm. Automatic exposure control, adjustment of mA/kV or iterative reconstruction technique used to limit radiation dose.    COMPARISON:  Radiograph  06/28/2023    FINDINGS:  Assessment mildly limited by motion.  Joint alignments are maintained with degenerative changes at the knee.  Fixation hardware in the lower femur and tibia with remote proximal fibular fracture.  Areas of heterotopic ossification along the lower portion of the femur.  Small knee effusion.  Areas of subcutaneous edema anteriorly below the knee.  More focal area of fluid in the subcutaneous tissues of the prepatellar region measures up to 5 cm in transverse diameter and 5 cm in length.  There is image noise from the hardware, but this fluid may have thin areas of peripheral enhancement.  No tracking subcutaneous gas.                                       X-Ray Tibia Fibula 2 View Right (Final result)  Result time 06/28/23 18:14:06      Final result by Tracy Zamudio MD (06/28/23 18:14:06)                   Impression:      Posttraumatic and postsurgical changes at the femur and lower leg.  There is chronic deformity at the distal femur with heterogeneous sclerosis and lucency superimposed on background bony demineralization.  No old imaging of this region available for comparison.  This makes it difficult to evaluate for acute superimposed lytic change.    Postsurgical and posttraumatic change at the tibia and fibula with bony demineralization.  No appreciable acute osseous abnormality.      Electronically signed by: Tracy Zamudio  Date:    06/28/2023  Time:    18:14               Narrative:    EXAMINATION:  XR FEMUR 2 VIEW RIGHT; XR TIBIA FIBULA 2 VIEW RIGHT    CLINICAL HISTORY:  possible infection;; infection;    TECHNIQUE:  AP and lateral views of the right femur were performed.    AP and lateral views of the right tibia and fibula were obtained.    COMPARISON:  Knee radiographs 06/28/2023, tibia and fibular radiographs 08/10/2022    FINDINGS:  There are postsurgical changes of ORIF at the distal femur with lateral plate and screws.  There are postsurgical changes of ORIF at the  tibia with antegrade intramedullary raven and proximal and distal interlocking screws.  Hardware appears intact.  No acute fracture seen.  There are old, healed fracture deformities.    There is chronic remodeling at the distal femur with heterogeneous appearance of the marrow and cortex distally.  There are areas of lucency as well as sclerosis.  There is no imaging through the distal femur available for comparison to evaluate for acute lytic changes superimposed on chronic background posttraumatic and postsurgical change.  Older prior radiographs of the tibia and fibula do not adequately imaged the area.  The bones are demineralized.    There is soft tissue swelling at the knee.  There is soft tissue swelling at the ankle.                                       X-Ray Femur 2 View Right (Final result)  Result time 06/28/23 18:14:06      Final result by Tracy Zamudio MD (06/28/23 18:14:06)                   Impression:      Posttraumatic and postsurgical changes at the femur and lower leg.  There is chronic deformity at the distal femur with heterogeneous sclerosis and lucency superimposed on background bony demineralization.  No old imaging of this region available for comparison.  This makes it difficult to evaluate for acute superimposed lytic change.    Postsurgical and posttraumatic change at the tibia and fibula with bony demineralization.  No appreciable acute osseous abnormality.      Electronically signed by: Tracy Zamudio  Date:    06/28/2023  Time:    18:14               Narrative:    EXAMINATION:  XR FEMUR 2 VIEW RIGHT; XR TIBIA FIBULA 2 VIEW RIGHT    CLINICAL HISTORY:  possible infection;; infection;    TECHNIQUE:  AP and lateral views of the right femur were performed.    AP and lateral views of the right tibia and fibula were obtained.    COMPARISON:  Knee radiographs 06/28/2023, tibia and fibular radiographs 08/10/2022    FINDINGS:  There are postsurgical changes of ORIF at the distal femur  with lateral plate and screws.  There are postsurgical changes of ORIF at the tibia with antegrade intramedullary raven and proximal and distal interlocking screws.  Hardware appears intact.  No acute fracture seen.  There are old, healed fracture deformities.    There is chronic remodeling at the distal femur with heterogeneous appearance of the marrow and cortex distally.  There are areas of lucency as well as sclerosis.  There is no imaging through the distal femur available for comparison to evaluate for acute lytic changes superimposed on chronic background posttraumatic and postsurgical change.  Older prior radiographs of the tibia and fibula do not adequately imaged the area.  The bones are demineralized.    There is soft tissue swelling at the knee.  There is soft tissue swelling at the ankle.                                       X-Ray Knee 3 View Right (Final result)  Result time 06/28/23 14:18:03      Final result by Lanette Waller MD (06/28/23 14:18:03)                   Impression:      1. No acute bony abnormality.  2. Soft tissue swelling around the knee.      Electronically signed by: Lanette Waller  Date:    06/28/2023  Time:    14:18               Narrative:    EXAMINATION:  XR KNEE 3 VIEW RIGHT    CLINICAL HISTORY:  Effusion, right knee    COMPARISON:  None.    FINDINGS:  There has been prior internal fixation of the femur and tibia.  There are chronic changes of the bones with heterotopic ossification around the knee.  There is no acute fracture identified.  There is soft tissue swelling around the knee.                                       Lab Review   Recent Results (from the past 24 hour(s))   Comprehensive Metabolic Panel    Collection Time: 07/06/23  1:49 AM   Result Value Ref Range    Sodium Level 129 (L) 136 - 145 mmol/L    Potassium Level 4.7 3.5 - 5.1 mmol/L    Chloride 96 (L) 98 - 107 mmol/L    Carbon Dioxide 25 22 - 29 mmol/L    Glucose Level 111 (H) 74 - 100 mg/dL    Blood Urea  Nitrogen 31.2 (H) 8.4 - 25.7 mg/dL    Creatinine 5.54 (H) 0.73 - 1.18 mg/dL    Calcium Level Total 8.1 (L) 8.4 - 10.2 mg/dL    Protein Total 6.1 (L) 6.4 - 8.3 gm/dL    Albumin Level 2.1 (L) 3.5 - 5.0 g/dL    Globulin 4.0 (H) 2.4 - 3.5 gm/dL    Albumin/Globulin Ratio 0.5 (L) 1.1 - 2.0 ratio    Bilirubin Total 0.3 <=1.5 mg/dL    Alkaline Phosphatase 70 40 - 150 unit/L    Alanine Aminotransferase 7 0 - 55 unit/L    Aspartate Aminotransferase 20 5 - 34 unit/L    eGFR 11 mls/min/1.73/m2   Magnesium    Collection Time: 07/06/23  1:49 AM   Result Value Ref Range    Magnesium Level 2.00 1.60 - 2.60 mg/dL   CBC with Differential    Collection Time: 07/06/23  1:49 AM   Result Value Ref Range    WBC 12.05 (H) 4.50 - 11.50 x10(3)/mcL    RBC 3.19 (L) 4.70 - 6.10 x10(6)/mcL    Hgb 7.8 (L) 14.0 - 18.0 g/dL    Hct 24.2 (L) 42.0 - 52.0 %    MCV 75.9 (L) 80.0 - 94.0 fL    MCH 24.5 (L) 27.0 - 31.0 pg    MCHC 32.2 (L) 33.0 - 36.0 g/dL    RDW 17.8 (H) 11.5 - 17.0 %    Platelet 629 (H) 130 - 400 x10(3)/mcL    MPV 9.9 7.4 - 10.4 fL    Neut % 82.5 %    Lymph % 10.7 %    Mono % 5.2 %    Eos % 0.3 %    Basophil % 0.2 %    Lymph # 1.29 0.6 - 4.6 x10(3)/mcL    Neut # 9.94 (H) 2.1 - 9.2 x10(3)/mcL    Mono # 0.63 0.1 - 1.3 x10(3)/mcL    Eos # 0.04 0 - 0.9 x10(3)/mcL    Baso # 0.02 <=0.2 x10(3)/mcL    IG# 0.13 (H) 0 - 0.04 x10(3)/mcL    IG% 1.1 %    NRBC% 0.0 %   Blood Gas (ABG)    Collection Time: 07/06/23  5:55 AM   Result Value Ref Range    Sample Type Arterial Blood     Sample site Left Radial Artery     Drawn by sd rrt     pH, Blood gas 7.420 7.350 - 7.450    pCO2, Blood gas 43.0 35.0 - 45.0 mmHg    pO2, Blood gas 69.0 (L) 80.0 - 100.0 mmHg    Sodium, Blood Gas 126 (L) 137 - 145 mmol/L    Potassium, Blood Gas 4.1 3.5 - 5.0 mmol/L    Calcium Level Ionized 1.11 (L) 1.12 - 1.23 mmol/L    TOC2, Blood gas 29.2 mmol/L    Base Excess, Blood gas 3.00 mmol/L    sO2, Blood gas 94.0 %    HCO3, Blood gas 27.9 >=15.0 mmol/L    Allens Test Yes     MODE AC      Oxygen Device, Blood gas Ventilator     FIO2, Blood gas 50 %    Mech Vt 450 ml    Mech RR 10 b/min    PEEP 10.0 cmH2O   RT Blood Gas    Collection Time: 07/06/23  2:36 PM   Result Value Ref Range    Sample Type Arterial Blood     Sample site Arterial Line     Drawn by AQUINO,RRT     pH, Blood gas 7.400 7.350 - 7.450    pCO2, Blood gas 46.0 (H) 35.0 - 45.0 mmHg    pO2, Blood gas 68.0 (L) 80.0 - 100.0 mmHg    Sodium, Blood Gas 128 (L) 137 - 145 mmol/L    Potassium, Blood Gas 4.1 3.5 - 5.0 mmol/L    Calcium Level Ionized 1.07 (L) 1.12 - 1.23 mmol/L    TOC2, Blood gas 29.9 mmol/L    Base Excess, Blood gas 3.10 (H) -2.00 - 2.00 mmol/L    sO2, Blood gas 93.0 %    HCO3, Blood gas 28.5 (H) 22.0 - 26.0 mmol/L    Allens Test N/A     MODE AC     FIO2, Blood gas 60 %    Mech Vt 450 ml    Mech RR 10 b/min    PEEP 10.0 cmH2O   RT Blood Gas    Collection Time: 07/06/23  7:07 PM   Result Value Ref Range    Sample Type Arterial Blood     Sample site Right Radial Artery     Drawn by tdl rrt     pH, Blood gas 7.350 7.350 - 7.450    pCO2, Blood gas 52.0 (HH) 35.0 - 45.0 mmHg    pO2, Blood gas 73.0 (L) 80.0 - 100.0 mmHg    Sodium, Blood Gas 128 (L) 137 - 145 mmol/L    Potassium, Blood Gas 4.5 3.5 - 5.0 mmol/L    Calcium Level Ionized 1.08 (L) 1.12 - 1.23 mmol/L    TOC2, Blood gas 30.3 mmol/L    Base Excess, Blood gas 2.60 (H) -2.00 - 2.00 mmol/L    sO2, Blood gas 93.7 %    HCO3, Blood gas 28.7 (H) 22.0 - 26.0 mmol/L    THb, Blood gas 8.4 (L) 12 - 16 g/dL    O2 Hb, Blood Gas 92.2 (L) 94.0 - 97.0 %    CO Hgb 1.5 0.5 - 1.5 %    Met Hgb 1.0 0.4 - 1.5 %    Allens Test Yes     MODE AC     FIO2, Blood gas 70 %    Mech Vt 450 ml    Mech RR 10 b/min    PEEP 10.0 cmH2O             Assessment/Plan:  1. SIRS with fevers  2. Group B Streptococcus Right knee prepatellar abscess  3. Group B Streptococcus Right knee septic arthritis  4.  Anemia/reactive thrombocytosis  5.  Paraplegia secondary to T-spine cord injury  6. History of hepatitis-C   7.  Chronic right ankle wound/osteomyelitis  8.  Diabetes    9.  Acute kidney injury  10. Acute hypoxic respiratory failure  11. Pulmonary edema with pleural effusions/ Pneumonitis      -Continue Merrem #3 and maintain chronic suppressive doxycycline    -No fevers noted and with slightly worse leukocytosis and worse thrombocytosis, follow  -7/4 blood cultures remain negative and sputum culture with moderate yeast  -7/6 CXR with progressive interstitial opacities, may be related to interstitial edema pneumonia or ARDS   -7/4 CT scan of the abdomen and pelvis and 7/3 chest x-ray results noted, likely dealing with pulmonary edema with pleural effusions and possibly superimposed infectious pneumonitis. We will get procalcitonin level for further evaluation  -6/28 right knee abscess culture with Group B Streptococcus  -Seen by Orthopedic surgery team calm s/p I&D of R prepatellar bursa abscess and septic R knee with cultures yielding Group G Streptococcus  -6/28 blood cultures negative  -Multiple comorbidities, paraplegic with chronic pressure wounds, right ankle osteomyelitis with exposed bone on chronic suppressive doxycycline  -Plan for a long-term antibiotic coverage, about a 6 week course for GBS septic arthritis, following inflammatory markers as well as maintaining on chronic suppressive antibiotics with doxycycline  -We will continue surgical site care per Orthopedic surgery team  -We will continue wound care  -He is to continue management of his hepatitis-C as outpatient  -Renal impairment noted, HD per Nephrology, started 7/4  -7/3 Renal ultrasound results noted  -Continue ventilator management and ICU support per intensivist  -Discussed with nursing staff.

## 2023-07-07 NOTE — PROGRESS NOTES
Progress Note  Nephrology    Admit Date: 6/28/2023   LOS: 9 days     SUBJECTIVE:     Follow-up For:  ANTON / ATN    Interval History:  59 Y/O male with history of paraplegia and neurogenic bladder and suprapubic cath and also history of Right knee infection and osteomyelitis  admitted to hospital for iv antibiotics   Pt also had oliguria and high BUN and CR   Started on hemodialysis  today was day 3 of dialysis and 2.6 liter of fluid removed   Still on vent and intubated     Review of Systems:  Constitutional: No fever or chills  Respiratory: No cough or shortness of breath  Cardiovascular: No chest pain or palpitations  Gastrointestinal: No nausea or vomiting  Neurological: No confusion or weakness  Still has supra pubic cath   On NG tube feeding     OBJECTIVE:     Vital Signs Range (Last 24H):  Vitals:    07/07/23 1400   BP: (!) 141/90   Pulse: 75   Resp: 19   Temp:        Temp:  [96.6 °F (35.9 °C)-99 °F (37.2 °C)]   Pulse:  []   Resp:  [11-41]   BP: ()/(48-94)   SpO2:  [92 %-100 %]     I & O (Last 24H):  Intake/Output Summary (Last 24 hours) at 7/7/2023 1538  Last data filed at 7/7/2023 1400  Gross per 24 hour   Intake 3912.69 ml   Output 3540 ml   Net 372.69 ml       Physical Exam:  On vent and sedated and intubated   Head   normal   Neck   supple   Chest   symmetric   Lungs   clear on vent  Heart   RRR  Abdomen   soft non tender   Ext   no edema         Laboratory Data:    Recent Labs   Lab 07/07/23  0141   *   K 4.9   CO2 23   BUN 40.1*   CREATININE 4.55*   GLUCOSE 96   CALCIUM 7.8*   PHOS 5.8*     Lab Results   Component Value Date    .3 (H) 07/04/2023    CALCIUM 7.8 (L) 07/07/2023    CAION 1.09 (L) 07/07/2023    PHOS 5.8 (H) 07/07/2023     Lab Results   Component Value Date    IRON 35 (L) 12/30/2021    TIBC 284 12/30/2021    FERRITIN 24.99 12/30/2021       Medications:  Medication list was reviewed and changes noted under Assessment/Plan.    Diagnostic Results:    Reviewed most recent  CT/US/Echo/MRI    ASSESSMENT/PLAN:   1   ANTON / ATN  2   hyponatremia    3   Paraplegia  4   Right knee infection   5   neurogenic bladder   6   anemia  7   HTN  8   DM2  9   A Fib  10  SHPT  11   CKD  stage unspecified       Plan  labs for tomorrow

## 2023-07-07 NOTE — NURSING
Nurses Note -- 4 Eyes      7/7/2023   1:24 AM      Skin assessed during: Q Shift Change      [] No Altered Skin Integrity Present    []Prevention Measures Documented      [x] Yes- Altered Skin Integrity Present or Discovered   [x] LDA Added if Not in Epic (Describe Wound)   [x] New Altered Skin Integrity was Present on Admit and Documented in LDA   [x] Wound Image Taken    Wound Care Consulted? Yes    Attending Nurse:  Michele Francois RN     Second RN/Staff Member:  YOLIE Ren

## 2023-07-07 NOTE — NURSING
07/07/23 1053   Post-Hemodialysis Assessment   Blood Volume Processed (Liters) 51.9 L   Dialyzer Clearance Clear   Duration of Treatment 180 minutes   Total UF (mL) 2500 mL   Patient Response to Treatment Pt tolerated HD tx well; NAD noted/ VSS. Total tx length 3hrs with 2.5 liter UF goal. 2 units of PRBC's also given while on HD.   Post-Hemodialysis Comments Pt deaccessed per P and  P

## 2023-07-08 LAB
ALBUMIN SERPL-MCNC: 2.3 G/DL (ref 3.5–5)
ALBUMIN/GLOB SERPL: 0.5 RATIO (ref 1.1–2)
ALLENS TEST BLOOD GAS (OHS): YES
ALP SERPL-CCNC: 110 UNIT/L (ref 40–150)
ALT SERPL-CCNC: 10 UNIT/L (ref 0–55)
AST SERPL-CCNC: 32 UNIT/L (ref 5–34)
BASE EXCESS BLD CALC-SCNC: -0.5 MMOL/L (ref -2–2)
BASOPHILS # BLD AUTO: 0.03 X10(3)/MCL
BASOPHILS NFR BLD AUTO: 0.2 %
BILIRUBIN DIRECT+TOT PNL SERPL-MCNC: 0.5 MG/DL
BLOOD GAS SAMPLE TYPE (OHS): ABNORMAL
BUN SERPL-MCNC: 46.7 MG/DL (ref 8.4–25.7)
CA-I BLD-SCNC: 1.08 MMOL/L (ref 1.12–1.23)
CALCIUM SERPL-MCNC: 8.6 MG/DL (ref 8.4–10.2)
CHLORIDE SERPL-SCNC: 95 MMOL/L (ref 98–107)
CO2 BLDA-SCNC: 27.3 MMOL/L
CO2 SERPL-SCNC: 24 MMOL/L (ref 22–29)
COHGB MFR BLDA: 1.4 % (ref 0.5–1.5)
CREAT SERPL-MCNC: 4.43 MG/DL (ref 0.73–1.18)
DRAWN BY BLOOD GAS (OHS): ABNORMAL
EOSINOPHIL # BLD AUTO: 0 X10(3)/MCL (ref 0–0.9)
EOSINOPHIL NFR BLD AUTO: 0 %
ERYTHROCYTE [DISTWIDTH] IN BLOOD BY AUTOMATED COUNT: 18.2 % (ref 11.5–17)
FIO2 BLOOD GAS (OHS): 60 %
GFR SERPLBLD CREATININE-BSD FMLA CKD-EPI: 15 MLS/MIN/1.73/M2
GLOBULIN SER-MCNC: 4.9 GM/DL (ref 2.4–3.5)
GLUCOSE SERPL-MCNC: 136 MG/DL (ref 74–100)
HCO3 BLDA-SCNC: 25.8 MMOL/L (ref 22–26)
HCT VFR BLD AUTO: 34.5 % (ref 42–52)
HGB BLD-MCNC: 11.5 G/DL (ref 14–18)
IMM GRANULOCYTES # BLD AUTO: 0.19 X10(3)/MCL (ref 0–0.04)
IMM GRANULOCYTES NFR BLD AUTO: 1.3 %
LYMPHOCYTES # BLD AUTO: 1.1 X10(3)/MCL (ref 0.6–4.6)
LYMPHOCYTES NFR BLD AUTO: 7.6 %
MCH RBC QN AUTO: 25.6 PG (ref 27–31)
MCHC RBC AUTO-ENTMCNC: 33.3 G/DL (ref 33–36)
MCV RBC AUTO: 76.8 FL (ref 80–94)
MECH RR (OHS): 16 B/MIN
MECH VT (OHS): 450 ML
METHGB MFR BLDA: 0.5 % (ref 0.4–1.5)
MODE (OHS): AC
MONOCYTES # BLD AUTO: 0.64 X10(3)/MCL (ref 0.1–1.3)
MONOCYTES NFR BLD AUTO: 4.4 %
NEUTROPHILS # BLD AUTO: 12.59 X10(3)/MCL (ref 2.1–9.2)
NEUTROPHILS NFR BLD AUTO: 86.5 %
NRBC BLD AUTO-RTO: 0 %
O2 HB BLOOD GAS (OHS): 90.1 % (ref 94–97)
OXYHGB MFR BLDA: 11 G/DL (ref 12–16)
PCO2 BLDA: 49 MMHG (ref 35–45)
PEEP (OHS): 10 CMH2O
PH BLDA: 7.33 [PH] (ref 7.35–7.45)
PLATELET # BLD AUTO: 588 X10(3)/MCL (ref 130–400)
PMV BLD AUTO: 10 FL (ref 7.4–10.4)
PO2 BLDA: 66 MMHG (ref 80–100)
POTASSIUM BLOOD GAS (OHS): 4.7 MMOL/L (ref 3.5–5)
POTASSIUM SERPL-SCNC: 5.5 MMOL/L (ref 3.5–5.1)
PROT SERPL-MCNC: 7.2 GM/DL (ref 6.4–8.3)
RBC # BLD AUTO: 4.49 X10(6)/MCL (ref 4.7–6.1)
SAMPLE SITE BLOOD GAS (OHS): ABNORMAL
SAO2 % BLDA: 91.2 %
SODIUM BLOOD GAS (OHS): 128 MMOL/L (ref 137–145)
SODIUM SERPL-SCNC: 134 MMOL/L (ref 136–145)
WBC # SPEC AUTO: 14.55 X10(3)/MCL (ref 4.5–11.5)

## 2023-07-08 PROCEDURE — 25000003 PHARM REV CODE 250: Performed by: INTERNAL MEDICINE

## 2023-07-08 PROCEDURE — 63600175 PHARM REV CODE 636 W HCPCS: Performed by: INTERNAL MEDICINE

## 2023-07-08 PROCEDURE — 99900031 HC PATIENT EDUCATION (STAT)

## 2023-07-08 PROCEDURE — 82803 BLOOD GASES ANY COMBINATION: CPT

## 2023-07-08 PROCEDURE — 94640 AIRWAY INHALATION TREATMENT: CPT

## 2023-07-08 PROCEDURE — 80053 COMPREHEN METABOLIC PANEL: CPT | Performed by: STUDENT IN AN ORGANIZED HEALTH CARE EDUCATION/TRAINING PROGRAM

## 2023-07-08 PROCEDURE — 25000003 PHARM REV CODE 250

## 2023-07-08 PROCEDURE — 27000221 HC OXYGEN, UP TO 24 HOURS

## 2023-07-08 PROCEDURE — 99900026 HC AIRWAY MAINTENANCE (STAT)

## 2023-07-08 PROCEDURE — 63600175 PHARM REV CODE 636 W HCPCS: Performed by: STUDENT IN AN ORGANIZED HEALTH CARE EDUCATION/TRAINING PROGRAM

## 2023-07-08 PROCEDURE — 85025 COMPLETE CBC W/AUTO DIFF WBC: CPT | Performed by: STUDENT IN AN ORGANIZED HEALTH CARE EDUCATION/TRAINING PROGRAM

## 2023-07-08 PROCEDURE — 87040 BLOOD CULTURE FOR BACTERIA: CPT

## 2023-07-08 PROCEDURE — 25000003 PHARM REV CODE 250: Performed by: STUDENT IN AN ORGANIZED HEALTH CARE EDUCATION/TRAINING PROGRAM

## 2023-07-08 PROCEDURE — 99900035 HC TECH TIME PER 15 MIN (STAT)

## 2023-07-08 PROCEDURE — 25000242 PHARM REV CODE 250 ALT 637 W/ HCPCS: Performed by: INTERNAL MEDICINE

## 2023-07-08 PROCEDURE — 20000000 HC ICU ROOM

## 2023-07-08 PROCEDURE — 80100014 HC HEMODIALYSIS 1:1

## 2023-07-08 PROCEDURE — 94003 VENT MGMT INPAT SUBQ DAY: CPT

## 2023-07-08 PROCEDURE — 36600 WITHDRAWAL OF ARTERIAL BLOOD: CPT

## 2023-07-08 RX ORDER — LABETALOL HYDROCHLORIDE 5 MG/ML
10 INJECTION, SOLUTION INTRAVENOUS EVERY 4 HOURS PRN
Status: DISCONTINUED | OUTPATIENT
Start: 2023-07-08 | End: 2023-07-11

## 2023-07-08 RX ORDER — HYDRALAZINE HYDROCHLORIDE 20 MG/ML
10 INJECTION INTRAMUSCULAR; INTRAVENOUS EVERY 8 HOURS PRN
Status: DISCONTINUED | OUTPATIENT
Start: 2023-07-08 | End: 2023-07-11

## 2023-07-08 RX ADMIN — GUAIFENESIN 400 MG: 200 SOLUTION ORAL at 05:07

## 2023-07-08 RX ADMIN — CARVEDILOL 6.25 MG: 3.12 TABLET, FILM COATED ORAL at 08:07

## 2023-07-08 RX ADMIN — SODIUM CHLORIDE SOLN NEBU 3% 4 ML: 3 NEBU SOLN at 12:07

## 2023-07-08 RX ADMIN — GUAIFENESIN 400 MG: 200 SOLUTION ORAL at 06:07

## 2023-07-08 RX ADMIN — DEXMEDETOMIDINE HYDROCHLORIDE 1.4 MCG/KG/HR: 400 INJECTION INTRAVENOUS at 03:07

## 2023-07-08 RX ADMIN — MUPIROCIN: 20 OINTMENT TOPICAL at 08:07

## 2023-07-08 RX ADMIN — GUAIFENESIN 400 MG: 200 SOLUTION ORAL at 10:07

## 2023-07-08 RX ADMIN — FUROSEMIDE 40 MG: 10 INJECTION, SOLUTION INTRAMUSCULAR; INTRAVENOUS at 05:07

## 2023-07-08 RX ADMIN — DEXMEDETOMIDINE HYDROCHLORIDE 1.4 MCG/KG/HR: 400 INJECTION INTRAVENOUS at 08:07

## 2023-07-08 RX ADMIN — SODIUM CHLORIDE SOLN NEBU 3% 4 ML: 3 NEBU SOLN at 08:07

## 2023-07-08 RX ADMIN — DOXYCYCLINE HYCLATE 100 MG: 100 TABLET, COATED ORAL at 08:07

## 2023-07-08 RX ADMIN — ENOXAPARIN SODIUM 30 MG: 30 INJECTION SUBCUTANEOUS at 04:07

## 2023-07-08 RX ADMIN — DEXMEDETOMIDINE HYDROCHLORIDE 1.4 MCG/KG/HR: 400 INJECTION INTRAVENOUS at 07:07

## 2023-07-08 RX ADMIN — LABETALOL HYDROCHLORIDE 10 MG: 5 INJECTION, SOLUTION INTRAVENOUS at 11:07

## 2023-07-08 RX ADMIN — MEROPENEM 1 G: 1 INJECTION, POWDER, FOR SOLUTION INTRAVENOUS at 08:07

## 2023-07-08 RX ADMIN — GUAIFENESIN 400 MG: 200 SOLUTION ORAL at 03:07

## 2023-07-08 RX ADMIN — PROPOFOL 30 MCG/KG/MIN: 10 INJECTION, EMULSION INTRAVENOUS at 02:07

## 2023-07-08 RX ADMIN — PROPOFOL 30 MCG/KG/MIN: 10 INJECTION, EMULSION INTRAVENOUS at 04:07

## 2023-07-08 RX ADMIN — DEXMEDETOMIDINE HYDROCHLORIDE 1.4 MCG/KG/HR: 400 INJECTION INTRAVENOUS at 04:07

## 2023-07-08 RX ADMIN — FUROSEMIDE 40 MG: 10 INJECTION, SOLUTION INTRAMUSCULAR; INTRAVENOUS at 08:07

## 2023-07-08 RX ADMIN — DEXMEDETOMIDINE HYDROCHLORIDE 1.4 MCG/KG/HR: 400 INJECTION INTRAVENOUS at 12:07

## 2023-07-08 RX ADMIN — DEXMEDETOMIDINE HYDROCHLORIDE 1.3 MCG/KG/HR: 400 INJECTION INTRAVENOUS at 03:07

## 2023-07-08 RX ADMIN — MICONAZOLE NITRATE 2 % TOPICAL POWDER: at 08:07

## 2023-07-08 RX ADMIN — ATORVASTATIN CALCIUM 20 MG: 10 TABLET, FILM COATED ORAL at 08:07

## 2023-07-08 RX ADMIN — SODIUM BICARBONATE 650 MG TABLET 650 MG: at 08:07

## 2023-07-08 RX ADMIN — PROPOFOL 30 MCG/KG/MIN: 10 INJECTION, EMULSION INTRAVENOUS at 08:07

## 2023-07-08 RX ADMIN — FENOFIBRATE 48 MG: 48 TABLET, FILM COATED ORAL at 08:07

## 2023-07-08 RX ADMIN — SODIUM CHLORIDE SOLN NEBU 3% 4 ML: 3 NEBU SOLN at 09:07

## 2023-07-08 RX ADMIN — FAMOTIDINE 20 MG: 10 INJECTION, SOLUTION INTRAVENOUS at 08:07

## 2023-07-08 RX ADMIN — AMLODIPINE BESYLATE 5 MG: 5 TABLET ORAL at 08:07

## 2023-07-08 RX ADMIN — SODIUM CHLORIDE SOLN NEBU 3% 4 ML: 3 NEBU SOLN at 04:07

## 2023-07-08 RX ADMIN — OXYBUTYNIN CHLORIDE 5 MG: 5 TABLET ORAL at 08:07

## 2023-07-08 RX ADMIN — GUAIFENESIN 400 MG: 200 SOLUTION ORAL at 02:07

## 2023-07-08 NOTE — NURSING
Nurses Note -- 4 Eyes      7/8/2023   3:26 AM      Skin assessed during: Q Shift Change      [] No Altered Skin Integrity Present    []Prevention Measures Documented      [x] Yes- Altered Skin Integrity Present or Discovered   [x] LDA Added if Not in Epic (Describe Wound)   [x] New Altered Skin Integrity was Present on Admit and Documented in LDA   [x] Wound Image Taken    Wound Care Consulted? Yes    Attending Nurse:  Michele Francois RN     Second RN/Staff Member:  YOLIE Ren

## 2023-07-08 NOTE — PROGRESS NOTES
Progress Note  Nephrology    Admit Date: 6/28/2023   LOS: 10 days     SUBJECTIVE:     Follow-up For:  ANTON / ATN     Interval History:  59 Y/O male with history of paraplegia , neurogenic bladder with suprapubic cath and also H/O Right knee infection admitted for IV antibiotics   Pt also had oliguria and increase BUN and CR  Started on hemodialysis and I saw this pt during dialysis today 1.7 liter of fluid removed   Still on vent and intubated    Review of Systems:  On vent and sedated , nurse reports no new problem   Constitutional: No fever or chills  Respiratory: No cough or shortness of breath  Cardiovascular: No chest pain or palpitations  Gastrointestinal: No nausea or vomiting  Neurological: No confusion or weakness    OBJECTIVE:     Vital Signs Range (Last 24H):  Vitals:    07/08/23 1130   BP: (!) 93/56   Pulse: 78   Resp: 18   Temp:        Temp:  [98.4 °F (36.9 °C)-99.5 °F (37.5 °C)]   Pulse:  []   Resp:  [18-41]   BP: ()/()   SpO2:  [88 %-99 %]     I & O (Last 24H):  Intake/Output Summary (Last 24 hours) at 7/8/2023 1205  Last data filed at 7/8/2023 0519  Gross per 24 hour   Intake 2221 ml   Output 325 ml   Net 1896 ml       Physical Exam:  On vent and sedated and intubated   Head   normal   Neck   supple   Chest   symmetric , no retraction   Lungs   clear   Heart   RRR  Abdomen   soft , non tender   Ext   no edema     Laboratory Data:    Recent Labs   Lab 07/07/23  0141 07/08/23  0132   * 134*   K 4.9 5.5*   CO2 23 24   BUN 40.1* 46.7*   CREATININE 4.55* 4.43*   GLUCOSE 96 136*   CALCIUM 7.8* 8.6   PHOS 5.8*  --      Lab Results   Component Value Date    .3 (H) 07/04/2023    CALCIUM 8.6 07/08/2023    CAION 1.08 (L) 07/08/2023    PHOS 5.8 (H) 07/07/2023     Lab Results   Component Value Date    IRON 35 (L) 12/30/2021    TIBC 284 12/30/2021    FERRITIN 24.99 12/30/2021       Medications:  Medication list was reviewed and changes noted under Assessment/Plan.    Diagnostic  Results:    Reviewed most recent CT/US/Echo/MRI    ASSESSMENT/PLAN:   1   ANTON / ATN  2   CKD , stage unspecified  3   SHPT  4   paraplegia  5   neurogenic bladder  with Supra pubic cath  6  anemia  7   HTN  8   DM2  9   A Fib   10  Right knee infection     Plan   labs in AM             DC water flushes with feeding

## 2023-07-08 NOTE — NURSING
07/08/23 1208   Post-Hemodialysis Assessment   Blood Volume Processed (Liters) 46.1 L   Dialyzer Clearance Lightly streaked   Duration of Treatment 180 minutes   Total UF (mL) 1719 mL   Patient Response to Treatment PT tolerated tx ok. Some low SBP towards the end of tx. MD Hall notified and ordered to turn UF off. MD also notified of BFR of CVC, highest BFR achieved without alarming was 270.   Post-Hemodialysis Comments 3hr tx, net 1719mL removed per MD Hall. Post tx VS at 1221: BP-132/72, HR-75, temp-98.5 (oral), resp-17.

## 2023-07-08 NOTE — PROGRESS NOTES
[facility] - [facility]  Pulmonary Critical Care Note    Patient Name: Bianca Khan  MRN: [id number]  Admission Date: 6/28/2023  Hospital Length of Stay: 10 days  Code Status: Full Code  Attending Provider: Khris Treadwell Jr., MD, *  Primary Care Provider: Areli Vargas PA-C     Subjective:     HPI:   Bianca Khan is a 58 y.o. male with PMH of paraplegia 2/2 spinal cord injury (T1-T6), neurogenic bladder with suprapubic catheter, chronic right ankle osteomyelitis, DM II, HTN, who presented to the ED on 6/28/2023 with CC of right knee pain. Per chart review, CT of right knee revealed 5 cm area of subcutaneous fluid in prepatellar region which was aspirated in the ED; he was taken to the OR on 6/29/2023 by orthopedic surgery team and was found to have prepatellar bursa infection and septic arthritis. Wound culture 6/29/2023 positive for strep agalactiae. Orthopedic surgery team recommended right-sided above-knee amputation d/t chronically infected hardware in right lower extremity. On 7/4/2023, a RRT was called d/t acute respiratory distress that was not improving despite being placed on vapotherm at 35 L/min with FiO2 100%. At the time of examination, patient's initial GCS was 10 but progressively reduced to a GCS of 6. Shared decision with patient's wife was made to intubate patient for airway protection though ABGs were within normal limits. He is admitted to the ICU for close monitoring and further medical management.    Hospital Course/Significant events:  6/29/2023 - I&D of right knee  7/4/2023 - Admitted to ICU, intubated for airway protection d/t altered mental status. Trialysis catheter placed and HD began for worsening renal function. 1 L removed via HD; required minimal levophed during HD.  7/5/2023 - 2L removed via HD without pressor support; patient remains vented, stable.  7/6/2023 - 1.3 L of fluid removed via HD (day 3). Increasing O2 req and thick secretions prompted  bronchoscopy; no significant findings in BL lungs. Suprapubic cath replaced by urology.   7/07/2023 - Hg fell to 7.1, transfused 2u PRBC with HD. 2.5 L removed via HD      24 Hour Interval History:  No acute events overnight. Labs this AM: uptrending WBC; hyperK; improving SCr;. I/O: 0.18 L urine output past 24 hours, net negative 1.05 L    Past Medical History:   Diagnosis Date    Arthritis     Chronic ulcer of ankle 05/26/2022    Frequent UTI 07/02/2019    Generalized anxiety disorder 05/26/2022    Neurogenic bladder 05/26/2022    Osteomyelitis 05/26/2022    Presence of suprapubic catheter 05/26/2022    Pure hypercholesterolemia 05/26/2022    Retention of urine, unspecified 08/09/2019    Spinal cord injury at T1-T6 level 04/20/2018       Past Surgical History:   Procedure Laterality Date    INCISION AND DRAINAGE, LOWER EXTREMITY Right 6/29/2023    Procedure: INCISION AND DRAINAGE, LOWER EXTREMITY;  Surgeon: Prabhu Shen DO;  Location: [facility];  Service: Orthopedics;  Laterality: Right;  supine bone foam wash stuff cultures    INSERTION OF INTRAMEDULLARY MARISA Right 8/10/2022    Procedure: INSERTION, INTRAMEDULLARY MARISA RIGHT TIBIA;  Surgeon: Jorge Orellana MD;  Location: [facility];  Service: Orthopedics;  Laterality: Right;       Social History     Socioeconomic History    Marital status:    Tobacco Use    Smoking status: Every Day     Types: Cigars, Cigarettes    Smokeless tobacco: Never   Substance and Sexual Activity    Alcohol use: Yes     Alcohol/week: 2.0 standard drinks     Types: 2 Cans of beer per week    Drug use: Not Currently    Sexual activity: Never       Current Outpatient Medications   Medication Instructions    amitriptyline (ELAVIL) 50 mg, Oral, Nightly    amLODIPine (NORVASC) 10 MG tablet 1 tablet    atorvastatin (LIPITOR) 20 mg, Oral, Nightly    busPIRone (BUSPAR) 5 MG Tab 1 tablet    carvediloL (COREG) 12.5 mg, Oral    cyclobenzaprine (FLEXERIL) 10 mg, Oral, 2 times daily PRN     doxycycline (VIBRAMYCIN) 100 mg, Oral, Daily    fenofibrate 160 mg, Oral    FLUoxetine 20 mg, Oral    gabapentin (NEURONTIN) 300 MG capsule 1 capsule    lisinopriL 10 mg, Oral, Daily    LORazepam (ATIVAN) 1 mg, Oral, 2 times daily    melatonin (MELATIN) 3 mg tablet TAKE TWO TABLETS BY MOUTH EVERY NIGHT AT BEDTIME AS NEEDED FOR INSOMNIA.    meloxicam (MOBIC) 15 mg, Oral, Daily    metFORMIN (GLUCOPHAGE) 500 MG tablet SMARTSI Tablet(s) By Mouth Morning-Evening    oxybutynin (DITROPAN) 5 mg, Oral, 2 times daily    oxyCODONE-acetaminophen (PERCOCET)  mg per tablet 1 tablet, Oral, Every 6 hours PRN    pantoprazole (PROTONIX) 40 MG tablet 1 tablet    temazepam (RESTORIL) 30 mg, Oral, Nightly    traZODone (DESYREL) 50 mg, Oral, Nightly    XARELTO 20 mg Tab SMARTSI Tablet(s) By Mouth Every Evening     Current Inpatient Medications   amLODIPine  5 mg Oral Daily    atorvastatin  20 mg Oral Nightly    carvediloL  6.25 mg Oral BID    doxycycline  100 mg Oral Q12H    enoxaparin  30 mg Subcutaneous Daily    famotidine (PF)  20 mg Intravenous Daily    fenofibrate  48 mg Oral Daily    furosemide (LASIX) injection  40 mg Intravenous BID    guaiFENesin 100 mg/5 ml  400 mg Per NG tube Q4H    meropenem (MERREM) IVPB  1 g Intravenous Q12H    miconazole NITRATE 2 %   Topical (Top) BID    mupirocin   Nasal BID    oxybutynin  5 mg Oral BID    sodium bicarbonate  650 mg Oral BID    sodium chloride 3%  4 mL Nebulization Q4H    sodium citrate-citric acid 500-334 mg/5 ml  30 mL Oral Once     Current Intravenous Infusions   dexmedeTOMIDine (Precedex) infusion (titrating) 1.4 mcg/kg/hr (23 0416)    NORepinephrine bitartrate-D5W 0.02 mcg/kg/min (23 1315)    propofoL 30 mcg/kg/min (23 0409)       Objective:     Intake/Output Summary (Last 24 hours) at 2023 0420  Last data filed at 2023 2148  Gross per 24 hour   Intake 2760 ml   Output 2750 ml   Net 10 ml     Vital Signs (Most Recent):  Temp: 98.4 °F (36.9 °C)  (07/08/23 0000)  Pulse: 96 (07/08/23 0400)  Resp: (!) 26 (07/08/23 0400)  BP: 129/69 (07/08/23 0400)  SpO2: 97 % (07/08/23 0400)  Body mass index is 27.51 kg/m².  Weight: 86.2 kg (190 lb) Vital Signs (24h Range):  Temp:  [96.6 °F (35.9 °C)-99 °F (37.2 °C)] 98.4 °F (36.9 °C)  Pulse:  [] 96  Resp:  [11-41] 26  SpO2:  [88 %-100 %] 97 %  BP: ()/() 129/69     Physical Exam  Vitals and nursing note reviewed.   Constitutional:       Interventions: He is sedated and intubated.   HENT:      Head: Normocephalic and atraumatic.      Nose: Nose normal.   Cardiovascular:      Rate and Rhythm: Normal rate and regular rhythm.      Pulses: Normal pulses.      Heart sounds: Normal heart sounds.   Pulmonary:      Effort: He is intubated.      Comments: Coarse lung sounds heard throughout the anterior chest.   Abdominal:      General: Bowel sounds are normal.      Palpations: Abdomen is soft.   Musculoskeletal:      Cervical back: Neck supple.   Skin:     General: Skin is warm and dry.   Neurological:      Comments: Unable to assess 2/2 patient sedation status at the time of exam   Psychiatric:      Comments: Unable to assess 2/2 patient sedation status at the time of exam       Lines/Drains/Airways       Central Venous Catheter Line  Duration                  Hemodialysis Catheter 07/04/23 0900 right internal jugular 3 days              Drain  Duration                  NG/OG Tube 07/04/23 0653 Center mouth 3 days         Suprapubic Catheter 07/06/23 1544 100% silicone 22 Fr. 1 day              Airway  Duration                  Airway - Non-Surgical 07/04/23 0422 Endotracheal Tube 3 days              Peripheral Intravenous Line  Duration                  Midline Catheter Insertion/Assessment  - Single Lumen 07/02/23 2128 Right basilic vein (medial side of arm) 5 days         Peripheral IV - Single Lumen 07/04/23 0600 18 G Anterior;Left Forearm 3 days         Peripheral IV - Single Lumen 07/04/23 0600 20 G  Left;Posterior Hand 3 days                  Significant Labs:  Lab Results   Component Value Date    WBC 14.55 (H) 07/08/2023    HGB 11.5 (L) 07/08/2023    HCT 34.5 (L) 07/08/2023    MCV 76.8 (L) 07/08/2023     (H) 07/08/2023       BMP  Lab Results   Component Value Date     (L) 07/08/2023    K 5.5 (H) 07/08/2023    CHLORIDE 95 (L) 07/08/2023    CO2 24 07/08/2023    BUN 46.7 (H) 07/08/2023    CREATININE 4.43 (H) 07/08/2023    CALCIUM 8.6 07/08/2023    AGAP 9.0 08/29/2022    EGFRNONAA >60 04/27/2022     ABG  Recent Labs   Lab 07/07/23 0530   PH 7.370   PO2 70.0*   HCO3 28.3*     Mechanical Ventilation Support:  Vent Mode: A/C (07/08/23 0224)  Set Rate: 16 BPM (07/08/23 0224)  Vt Set: 450 mL (07/08/23 0224)  Pressure Support: 10 cmH20 (07/05/23 0736)  PEEP/CPAP: 10 cmH20 (07/08/23 0224)  Oxygen Concentration (%): 60 (07/08/23 0224)  Peak Airway Pressure: 34 cmH20 (07/08/23 0224)  Total Ve: 11.6 L/m (07/08/23 0224)  F/VT Ratio<105 (RSBI): (!) 77.32 (07/08/23 0224)    Significant Imaging:  I have reviewed the pertinent imaging within the past 24 hours.    Assessment/Plan:     Assessment  Extensive bilateral pulmonary infiltrates  DX:  Infectious pneumonia versus ARDS versus hydrostatic/non hydrostatic pulmonary edema   Sputum culture showed moderate yeast  Intubated 07/04/2023  Prepatellar bursitis/septic arthritis of the right knee (strep agalactiae)   Post I&D on 06/29/2023   Ortho no longer planning for right above-the-knee amputation in near future due to infectious suppression  Acute renal failure, oliguric  Initiation of hemodialysis on 07/04/2023  Acute encephalopathy, likely metabolic related to above, currently clouded by sedation   Reported paroxysmal atrial fibrillation with permenant pacemaker, currently in sinus rhythm   H/o chornic paraplegia at T1 2/2 spinal cordy injury, neurogenic bladder, chronic right ankle osteomyelitis, DM2, HTN    Plan  --Continue ICU level of care for ongoing  monitoring and medical management  -ID, urology, orthopedics, wound care teams following, appreciate their assistance   -HD/Ultrafiltration as per nephrology   -Continue IV meropenem (day 5); no culture growth to date  -titrate mechanical ventilation for ARDS net protocol. plan for spontaneous breathing trial     DVT Prophylaxis: LVX 30   GI Prophylaxis: Pepcid 20     32 minutes of critical care was time spent personally by me on the following activities: development of treatment plan with patient or surrogate and bedside caregivers, discussions with consultants, evaluation of patient's response to treatment, examination of patient, ordering and performing treatments and interventions, ordering and review of laboratory studies, ordering and review of radiographic studies, pulse oximetry, re-evaluation of patient's condition.  This critical care time did not overlap with that of any other provider or involve time for any procedures.     Omar Reza DO PGY-1  Pulmonary Critical Care Medicine  [facility] - [facility]

## 2023-07-09 LAB
ALBUMIN SERPL-MCNC: 2 G/DL (ref 3.5–5)
ALBUMIN/GLOB SERPL: 0.4 RATIO (ref 1.1–2)
ALLENS TEST BLOOD GAS (OHS): YES
ALP SERPL-CCNC: 109 UNIT/L (ref 40–150)
ALT SERPL-CCNC: 9 UNIT/L (ref 0–55)
AST SERPL-CCNC: 27 UNIT/L (ref 5–34)
BACTERIA BLD CULT: NORMAL
BACTERIA BLD CULT: NORMAL
BASE EXCESS BLD CALC-SCNC: 2.1 MMOL/L (ref -2–2)
BASOPHILS # BLD AUTO: 0.02 X10(3)/MCL
BASOPHILS NFR BLD AUTO: 0.2 %
BILIRUBIN DIRECT+TOT PNL SERPL-MCNC: 0.4 MG/DL
BLOOD GAS SAMPLE TYPE (OHS): ABNORMAL
BUN SERPL-MCNC: 57.1 MG/DL (ref 8.4–25.7)
CA-I BLD-SCNC: 1.08 MMOL/L (ref 1.12–1.23)
CALCIUM SERPL-MCNC: 8.6 MG/DL (ref 8.4–10.2)
CHLORIDE SERPL-SCNC: 93 MMOL/L (ref 98–107)
CO2 BLDA-SCNC: 28.7 MMOL/L
CO2 SERPL-SCNC: 22 MMOL/L (ref 22–29)
COHGB MFR BLDA: 1.2 % (ref 0.5–1.5)
CREAT SERPL-MCNC: 4.04 MG/DL (ref 0.73–1.18)
DRAWN BY BLOOD GAS (OHS): ABNORMAL
EOSINOPHIL # BLD AUTO: 0.01 X10(3)/MCL (ref 0–0.9)
EOSINOPHIL NFR BLD AUTO: 0.1 %
ERYTHROCYTE [DISTWIDTH] IN BLOOD BY AUTOMATED COUNT: 18.6 % (ref 11.5–17)
FIO2 BLOOD GAS (OHS): 40 %
GFR SERPLBLD CREATININE-BSD FMLA CKD-EPI: 16 MLS/MIN/1.73/M2
GLOBULIN SER-MCNC: 5.2 GM/DL (ref 2.4–3.5)
GLUCOSE SERPL-MCNC: 120 MG/DL (ref 74–100)
HCO3 BLDA-SCNC: 27.3 MMOL/L (ref 22–26)
HCT VFR BLD AUTO: 31.9 % (ref 42–52)
HGB BLD-MCNC: 10.7 G/DL (ref 14–18)
IMM GRANULOCYTES # BLD AUTO: 0.16 X10(3)/MCL (ref 0–0.04)
IMM GRANULOCYTES NFR BLD AUTO: 1.3 %
LACTATE SERPL-SCNC: 1.3 MMOL/L (ref 0.5–2.2)
LYMPHOCYTES # BLD AUTO: 1.14 X10(3)/MCL (ref 0.6–4.6)
LYMPHOCYTES NFR BLD AUTO: 9.4 %
MAGNESIUM SERPL-MCNC: 2 MG/DL (ref 1.6–2.6)
MCH RBC QN AUTO: 25.2 PG (ref 27–31)
MCHC RBC AUTO-ENTMCNC: 33.5 G/DL (ref 33–36)
MCV RBC AUTO: 75.2 FL (ref 80–94)
MECH RR (OHS): 16 B/MIN
MECH VT (OHS): 450 ML
METHGB MFR BLDA: 1 % (ref 0.4–1.5)
MONOCYTES # BLD AUTO: 0.78 X10(3)/MCL (ref 0.1–1.3)
MONOCYTES NFR BLD AUTO: 6.4 %
NEUTROPHILS # BLD AUTO: 10.08 X10(3)/MCL (ref 2.1–9.2)
NEUTROPHILS NFR BLD AUTO: 82.6 %
NRBC BLD AUTO-RTO: 0 %
O2 HB BLOOD GAS (OHS): 92.9 % (ref 94–97)
OXYHGB MFR BLDA: 10.2 G/DL (ref 12–16)
PCO2 BLDA: 44 MMHG (ref 35–45)
PEEP (OHS): 10 CMH2O
PH BLDA: 7.4 [PH] (ref 7.35–7.45)
PHOSPHATE SERPL-MCNC: 5.6 MG/DL (ref 2.3–4.7)
PLATELET # BLD AUTO: 589 X10(3)/MCL (ref 130–400)
PMV BLD AUTO: 10.2 FL (ref 7.4–10.4)
PO2 BLDA: 73 MMHG (ref 80–100)
POTASSIUM BLOOD GAS (OHS): 4.5 MMOL/L (ref 3.5–5)
POTASSIUM SERPL-SCNC: 5.1 MMOL/L (ref 3.5–5.1)
PROT SERPL-MCNC: 7.2 GM/DL (ref 6.4–8.3)
RBC # BLD AUTO: 4.24 X10(6)/MCL (ref 4.7–6.1)
SAMPLE SITE BLOOD GAS (OHS): ABNORMAL
SAO2 % BLDA: 94.5 %
SODIUM BLOOD GAS (OHS): 128 MMOL/L (ref 137–145)
SODIUM SERPL-SCNC: 130 MMOL/L (ref 136–145)
WBC # SPEC AUTO: 12.19 X10(3)/MCL (ref 4.5–11.5)

## 2023-07-09 PROCEDURE — 25000003 PHARM REV CODE 250

## 2023-07-09 PROCEDURE — 20000000 HC ICU ROOM

## 2023-07-09 PROCEDURE — 84100 ASSAY OF PHOSPHORUS: CPT | Performed by: INTERNAL MEDICINE

## 2023-07-09 PROCEDURE — 99900031 HC PATIENT EDUCATION (STAT)

## 2023-07-09 PROCEDURE — 83605 ASSAY OF LACTIC ACID: CPT

## 2023-07-09 PROCEDURE — 25000003 PHARM REV CODE 250: Performed by: INTERNAL MEDICINE

## 2023-07-09 PROCEDURE — 99900035 HC TECH TIME PER 15 MIN (STAT)

## 2023-07-09 PROCEDURE — 27200966 HC CLOSED SUCTION SYSTEM

## 2023-07-09 PROCEDURE — 94640 AIRWAY INHALATION TREATMENT: CPT

## 2023-07-09 PROCEDURE — 82803 BLOOD GASES ANY COMBINATION: CPT

## 2023-07-09 PROCEDURE — 94761 N-INVAS EAR/PLS OXIMETRY MLT: CPT

## 2023-07-09 PROCEDURE — 80053 COMPREHEN METABOLIC PANEL: CPT | Performed by: STUDENT IN AN ORGANIZED HEALTH CARE EDUCATION/TRAINING PROGRAM

## 2023-07-09 PROCEDURE — 27000221 HC OXYGEN, UP TO 24 HOURS

## 2023-07-09 PROCEDURE — 94003 VENT MGMT INPAT SUBQ DAY: CPT

## 2023-07-09 PROCEDURE — 25000003 PHARM REV CODE 250: Performed by: STUDENT IN AN ORGANIZED HEALTH CARE EDUCATION/TRAINING PROGRAM

## 2023-07-09 PROCEDURE — 63600175 PHARM REV CODE 636 W HCPCS: Performed by: STUDENT IN AN ORGANIZED HEALTH CARE EDUCATION/TRAINING PROGRAM

## 2023-07-09 PROCEDURE — 85025 COMPLETE CBC W/AUTO DIFF WBC: CPT | Performed by: STUDENT IN AN ORGANIZED HEALTH CARE EDUCATION/TRAINING PROGRAM

## 2023-07-09 PROCEDURE — 63600175 PHARM REV CODE 636 W HCPCS: Performed by: INTERNAL MEDICINE

## 2023-07-09 PROCEDURE — 99900026 HC AIRWAY MAINTENANCE (STAT)

## 2023-07-09 PROCEDURE — 83735 ASSAY OF MAGNESIUM: CPT | Performed by: INTERNAL MEDICINE

## 2023-07-09 PROCEDURE — 25000242 PHARM REV CODE 250 ALT 637 W/ HCPCS: Performed by: INTERNAL MEDICINE

## 2023-07-09 PROCEDURE — 36600 WITHDRAWAL OF ARTERIAL BLOOD: CPT

## 2023-07-09 RX ORDER — SODIUM CHLORIDE 9 MG/ML
INJECTION, SOLUTION INTRAVENOUS ONCE
Status: CANCELLED | OUTPATIENT
Start: 2023-07-09 | End: 2023-07-09

## 2023-07-09 RX ORDER — SODIUM CHLORIDE 9 MG/ML
INJECTION, SOLUTION INTRAVENOUS
Status: CANCELLED | OUTPATIENT
Start: 2023-07-09

## 2023-07-09 RX ADMIN — ACETAMINOPHEN 650 MG: 325 TABLET, FILM COATED ORAL at 08:07

## 2023-07-09 RX ADMIN — SODIUM BICARBONATE 650 MG TABLET 650 MG: at 08:07

## 2023-07-09 RX ADMIN — DOXYCYCLINE HYCLATE 100 MG: 100 TABLET, COATED ORAL at 08:07

## 2023-07-09 RX ADMIN — PROPOFOL 30 MCG/KG/MIN: 10 INJECTION, EMULSION INTRAVENOUS at 05:07

## 2023-07-09 RX ADMIN — MICONAZOLE NITRATE 2 % TOPICAL POWDER: at 08:07

## 2023-07-09 RX ADMIN — DEXMEDETOMIDINE HYDROCHLORIDE 1 MCG/KG/HR: 400 INJECTION INTRAVENOUS at 08:07

## 2023-07-09 RX ADMIN — OXYBUTYNIN CHLORIDE 5 MG: 5 TABLET ORAL at 08:07

## 2023-07-09 RX ADMIN — SODIUM CHLORIDE SOLN NEBU 3% 4 ML: 3 NEBU SOLN at 08:07

## 2023-07-09 RX ADMIN — DEXMEDETOMIDINE HYDROCHLORIDE 1.4 MCG/KG/HR: 400 INJECTION INTRAVENOUS at 08:07

## 2023-07-09 RX ADMIN — FUROSEMIDE 40 MG: 10 INJECTION, SOLUTION INTRAMUSCULAR; INTRAVENOUS at 08:07

## 2023-07-09 RX ADMIN — SODIUM CHLORIDE SOLN NEBU 3% 4 ML: 3 NEBU SOLN at 12:07

## 2023-07-09 RX ADMIN — DEXMEDETOMIDINE HYDROCHLORIDE 1.4 MCG/KG/HR: 400 INJECTION INTRAVENOUS at 05:07

## 2023-07-09 RX ADMIN — CARVEDILOL 6.25 MG: 3.12 TABLET, FILM COATED ORAL at 08:07

## 2023-07-09 RX ADMIN — DEXMEDETOMIDINE HYDROCHLORIDE 1 MCG/KG/HR: 400 INJECTION INTRAVENOUS at 01:07

## 2023-07-09 RX ADMIN — DEXMEDETOMIDINE HYDROCHLORIDE 1.4 MCG/KG/HR: 400 INJECTION INTRAVENOUS at 03:07

## 2023-07-09 RX ADMIN — GUAIFENESIN 400 MG: 200 SOLUTION ORAL at 01:07

## 2023-07-09 RX ADMIN — GUAIFENESIN 400 MG: 200 SOLUTION ORAL at 03:07

## 2023-07-09 RX ADMIN — SODIUM CHLORIDE SOLN NEBU 3% 4 ML: 3 NEBU SOLN at 04:07

## 2023-07-09 RX ADMIN — AMLODIPINE BESYLATE 5 MG: 5 TABLET ORAL at 08:07

## 2023-07-09 RX ADMIN — ENOXAPARIN SODIUM 30 MG: 30 INJECTION SUBCUTANEOUS at 05:07

## 2023-07-09 RX ADMIN — PROPOFOL 30 MCG/KG/MIN: 10 INJECTION, EMULSION INTRAVENOUS at 03:07

## 2023-07-09 RX ADMIN — FENOFIBRATE 48 MG: 48 TABLET, FILM COATED ORAL at 08:07

## 2023-07-09 RX ADMIN — MEROPENEM 1 G: 1 INJECTION, POWDER, FOR SOLUTION INTRAVENOUS at 08:07

## 2023-07-09 RX ADMIN — GUAIFENESIN 400 MG: 200 SOLUTION ORAL at 11:07

## 2023-07-09 RX ADMIN — ATORVASTATIN CALCIUM 20 MG: 10 TABLET, FILM COATED ORAL at 08:07

## 2023-07-09 RX ADMIN — GUAIFENESIN 400 MG: 200 SOLUTION ORAL at 06:07

## 2023-07-09 RX ADMIN — FUROSEMIDE 40 MG: 10 INJECTION, SOLUTION INTRAMUSCULAR; INTRAVENOUS at 05:07

## 2023-07-09 RX ADMIN — FAMOTIDINE 20 MG: 10 INJECTION, SOLUTION INTRAVENOUS at 08:07

## 2023-07-09 RX ADMIN — SODIUM CHLORIDE SOLN NEBU 3% 4 ML: 3 NEBU SOLN at 11:07

## 2023-07-09 RX ADMIN — PROPOFOL 30 MCG/KG/MIN: 10 INJECTION, EMULSION INTRAVENOUS at 08:07

## 2023-07-09 RX ADMIN — ACETAMINOPHEN 650 MG: 325 TABLET, FILM COATED ORAL at 12:07

## 2023-07-09 RX ADMIN — GUAIFENESIN 400 MG: 200 SOLUTION ORAL at 10:07

## 2023-07-09 NOTE — PROGRESS NOTES
Progress Note  Nephrology    Admit Date: 6/28/2023   LOS: 11 days     SUBJECTIVE:     Follow-up For:  ANTON / ATN    Interval History:  59 Y/O male with history of Paraplegia with neurogenic bladder with suprapubic catheter and frequent infection admitted to hospital for Right knee infection and need of antibiotics   Pt also had oliguria with increase of BUN and CR   Was started on dialysis due to high cr and oliguria   Pts creatinin  was 8.12 4 days ago  had hemodialysis 3 days in a row    Currently intubated , on vent , and sedated     Review of Systems:  Nurse reports no new problem   Constitutional: No fever or chills  Respiratory: No cough or shortness of breath  Cardiovascular: No chest pain or palpitations  Gastrointestinal: No nausea or vomiting  Neurological: No confusion or weakness    OBJECTIVE:     Vital Signs Range (Last 24H):  Vitals:    07/09/23 1136   BP:    Pulse: 90   Resp: (!) 26   Temp:        Temp:  [99.1 °F (37.3 °C)-102.8 °F (39.3 °C)]   Pulse:  []   Resp:  [17-48]   BP: (120-178)/()   SpO2:  [90 %-98 %]     I & O (Last 24H):  Intake/Output Summary (Last 24 hours) at 7/9/2023 1300  Last data filed at 7/9/2023 0659  Gross per 24 hour   Intake 2310.68 ml   Output 450 ml   Net 1860.68 ml       Physical Exam:  On vent and sedated   Urine out put  550 cc per 24 hrs  Head   normal   Neck   supple   Chest   symmetric   Lungs   clear   Heart   RRR  Abdomen   soft , non tender   Ext   no edema     Laboratory Data:    Recent Labs   Lab 07/09/23  0037   *   K 5.1   CO2 22   BUN 57.1*   CREATININE 4.04*   GLUCOSE 120*   CALCIUM 8.6   PHOS 5.6*     Lab Results   Component Value Date    .3 (H) 07/04/2023    CALCIUM 8.6 07/09/2023    CAION 1.08 (L) 07/09/2023    PHOS 5.6 (H) 07/09/2023     Lab Results   Component Value Date    IRON 35 (L) 12/30/2021    TIBC 284 12/30/2021    FERRITIN 24.99 12/30/2021       Medications:  Medication list was reviewed and changes noted under  Assessment/Plan.    Diagnostic Results:    Reviewed most recent CT/US/Echo/MRI    ASSESSMENT/PLAN:   1   ANTON / ATN  2   CKD   stage unspecified   3   SHPT  4   paraplegia  5   neurogenic bladder  suprapubic cath   6   anemia   7   HTN  8   DM 2  9   A fib  10  Right knee infection       Plan  hemodialysis MWF    Labs in AM

## 2023-07-09 NOTE — PROGRESS NOTES
[facility] - [facility]  Pulmonary Critical Care Note    Patient Name: Bianca Khan  MRN: [id number]  Admission Date: 6/28/2023  Hospital Length of Stay: 11 days  Code Status: Full Code  Attending Provider: Khris Treadwell Jr., MD, *  Primary Care Provider: Areli Vargas PA-C     Subjective:     HPI:   Bianca Khan is a 58 y.o. male with PMH of paraplegia 2/2 spinal cord injury (T1-T6), neurogenic bladder with suprapubic catheter, chronic right ankle osteomyelitis, DM II, HTN, who presented to the ED on 6/28/2023 with CC of right knee pain. Per chart review, CT of right knee revealed 5 cm area of subcutaneous fluid in prepatellar region which was aspirated in the ED; he was taken to the OR on 6/29/2023 by orthopedic surgery team and was found to have prepatellar bursa infection and septic arthritis. Wound culture 6/29/2023 positive for strep agalactiae. Orthopedic surgery team recommended right-sided above-knee amputation d/t chronically infected hardware in right lower extremity. On 7/4/2023, a RRT was called d/t acute respiratory distress that was not improving despite being placed on vapotherm at 35 L/min with FiO2 100%. At the time of examination, patient's initial GCS was 10 but progressively reduced to a GCS of 6. Shared decision with patient's wife was made to intubate patient for airway protection though ABGs were within normal limits. He is admitted to the ICU for close monitoring and further medical management.    Hospital Course/Significant events:  6/29/2023 - I&D of right knee  7/4/2023 - Admitted to ICU, intubated for airway protection d/t altered mental status. Trialysis catheter placed and HD began for worsening renal function. 1 L removed via HD; required minimal levophed during HD.  7/5/2023 - 2L removed via HD without pressor support; patient remains vented, stable.  7/6/2023 - 1.3 L of fluid removed via HD (day 3). Increasing O2 req and thick secretions prompted  bronchoscopy; no significant findings in BL lungs. Suprapubic cath replaced by urology.   7/07/2023 - Hg fell to 7.1, transfused 2u PRBC with HD. 2.5 L removed via HD      24 Hour Interval History:  Patient had episode of fever overnight with Tmax 102.8. WBC continues to trend up this AM. Blood cultures redrawn at that time. Patient received transfusion yesterday with improvement in H/H 11.5/34.5. Patient had dialysis yesterday with 1.7 UF and improvement in creatinine to 4.04.  I/O: 0.2 L urine output past 24 hours, net negative.    Past Medical History:   Diagnosis Date    Arthritis     Chronic ulcer of ankle 05/26/2022    Frequent UTI 07/02/2019    Generalized anxiety disorder 05/26/2022    Neurogenic bladder 05/26/2022    Osteomyelitis 05/26/2022    Presence of suprapubic catheter 05/26/2022    Pure hypercholesterolemia 05/26/2022    Retention of urine, unspecified 08/09/2019    Spinal cord injury at T1-T6 level 04/20/2018       Past Surgical History:   Procedure Laterality Date    INCISION AND DRAINAGE, LOWER EXTREMITY Right 6/29/2023    Procedure: INCISION AND DRAINAGE, LOWER EXTREMITY;  Surgeon: Prabhu Shen DO;  Location: [facility];  Service: Orthopedics;  Laterality: Right;  supine bone foam wash stuff cultures    INSERTION OF INTRAMEDULLARY MARISA Right 8/10/2022    Procedure: INSERTION, INTRAMEDULLARY MARISA RIGHT TIBIA;  Surgeon: Jorge Orellana MD;  Location: [facility];  Service: Orthopedics;  Laterality: Right;       Social History     Socioeconomic History    Marital status:    Tobacco Use    Smoking status: Every Day     Types: Cigars, Cigarettes    Smokeless tobacco: Never   Substance and Sexual Activity    Alcohol use: Yes     Alcohol/week: 2.0 standard drinks     Types: 2 Cans of beer per week    Drug use: Not Currently    Sexual activity: Never       Current Outpatient Medications   Medication Instructions    amitriptyline (ELAVIL) 50 mg, Oral, Nightly    amLODIPine (NORVASC) 10 MG tablet 1 tablet     atorvastatin (LIPITOR) 20 mg, Oral, Nightly    busPIRone (BUSPAR) 5 MG Tab 1 tablet    carvediloL (COREG) 12.5 mg, Oral    cyclobenzaprine (FLEXERIL) 10 mg, Oral, 2 times daily PRN    doxycycline (VIBRAMYCIN) 100 mg, Oral, Daily    fenofibrate 160 mg, Oral    FLUoxetine 20 mg, Oral    gabapentin (NEURONTIN) 300 MG capsule 1 capsule    lisinopriL 10 mg, Oral, Daily    LORazepam (ATIVAN) 1 mg, Oral, 2 times daily    melatonin (MELATIN) 3 mg tablet TAKE TWO TABLETS BY MOUTH EVERY NIGHT AT BEDTIME AS NEEDED FOR INSOMNIA.    meloxicam (MOBIC) 15 mg, Oral, Daily    metFORMIN (GLUCOPHAGE) 500 MG tablet SMARTSI Tablet(s) By Mouth Morning-Evening    oxybutynin (DITROPAN) 5 mg, Oral, 2 times daily    oxyCODONE-acetaminophen (PERCOCET)  mg per tablet 1 tablet, Oral, Every 6 hours PRN    pantoprazole (PROTONIX) 40 MG tablet 1 tablet    temazepam (RESTORIL) 30 mg, Oral, Nightly    traZODone (DESYREL) 50 mg, Oral, Nightly    XARELTO 20 mg Tab SMARTSI Tablet(s) By Mouth Every Evening     Current Inpatient Medications   amLODIPine  5 mg Oral Daily    atorvastatin  20 mg Oral Nightly    carvediloL  6.25 mg Oral BID    doxycycline  100 mg Oral Q12H    enoxaparin  30 mg Subcutaneous Daily    famotidine (PF)  20 mg Intravenous Daily    fenofibrate  48 mg Oral Daily    furosemide (LASIX) injection  40 mg Intravenous BID    guaiFENesin 100 mg/5 ml  400 mg Per NG tube Q4H    meropenem (MERREM) IVPB  1 g Intravenous Q12H    miconazole NITRATE 2 %   Topical (Top) BID    oxybutynin  5 mg Oral BID    sodium bicarbonate  650 mg Oral BID    sodium chloride 3%  4 mL Nebulization Q4H    sodium citrate-citric acid 500-334 mg/5 ml  30 mL Oral Once     Current Intravenous Infusions   dexmedeTOMIDine (Precedex) infusion (titrating) 1.4 mcg/kg/hr (23)    NORepinephrine bitartrate-D5W 0.02 mcg/kg/min (23 1315)    propofoL 30 mcg/kg/min (23)       Objective:     Intake/Output Summary (Last 24 hours) at 2023  0304  Last data filed at 7/8/2023 1400  Gross per 24 hour   Intake 2782.68 ml   Output 1964 ml   Net 818.68 ml       Vital Signs (Most Recent):  Temp: (!) 102.8 °F (39.3 °C) (07/09/23 0000)  Pulse: 105 (07/09/23 0243)  Resp: (!) 35 (07/09/23 0243)  BP: (!) 166/93 (07/09/23 0000)  SpO2: (!) 94 % (07/09/23 0243)  Body mass index is 27.51 kg/m².  Weight: 86.2 kg (190 lb) Vital Signs (24h Range):  Temp:  [98.3 °F (36.8 °C)-102.8 °F (39.3 °C)] 102.8 °F (39.3 °C)  Pulse:  [] 105  Resp:  [15-48] 35  SpO2:  [90 %-99 %] 94 %  BP: ()/() 166/93     Physical Exam  Vitals and nursing note reviewed.   Constitutional:       Interventions: He is sedated and intubated.   HENT:      Head: Normocephalic and atraumatic.      Nose: Nose normal.   Cardiovascular:      Rate and Rhythm: Normal rate and regular rhythm.      Pulses: Normal pulses.      Heart sounds: Normal heart sounds.   Pulmonary:      Effort: He is intubated.      Comments: Coarse lung sounds heard throughout the anterior chest.   Abdominal:      General: Bowel sounds are normal.      Palpations: Abdomen is soft.   Musculoskeletal:      Cervical back: Neck supple.   Skin:     General: Skin is warm and dry.   Neurological:      Comments: Unable to assess 2/2 patient sedation status at the time of exam   Psychiatric:      Comments: Unable to assess 2/2 patient sedation status at the time of exam       Lines/Drains/Airways       Central Venous Catheter Line  Duration                  Hemodialysis Catheter 07/04/23 0900 right internal jugular 4 days              Drain  Duration                  NG/OG Tube 07/04/23 0653 Center mouth 4 days         Suprapubic Catheter 07/06/23 1544 100% silicone 22 Fr. 2 days              Airway  Duration                  Airway - Non-Surgical 07/04/23 0422 Endotracheal Tube 4 days              Peripheral Intravenous Line  Duration                  Midline Catheter Insertion/Assessment  - Single Lumen 07/02/23 2128 Right  basilic vein (medial side of arm) 6 days         Peripheral IV - Single Lumen 07/04/23 0600 18 G Anterior;Left Forearm 4 days         Peripheral IV - Single Lumen 07/04/23 0600 20 G Left;Posterior Hand 4 days                  Significant Labs:  Lab Results   Component Value Date    WBC 14.55 (H) 07/08/2023    HGB 11.5 (L) 07/08/2023    HCT 34.5 (L) 07/08/2023    MCV 76.8 (L) 07/08/2023     (H) 07/08/2023       BMP  Lab Results   Component Value Date     (L) 07/09/2023    K 5.1 07/09/2023    CHLORIDE 93 (L) 07/09/2023    CO2 22 07/09/2023    BUN 57.1 (H) 07/09/2023    CREATININE 4.04 (H) 07/09/2023    CALCIUM 8.6 07/09/2023    AGAP 9.0 08/29/2022    EGFRNONAA >60 04/27/2022     ABG  Recent Labs   Lab 07/08/23 0606   PH 7.330*   PO2 66.0*   HCO3 25.8       Mechanical Ventilation Support:  Vent Mode: A/C (07/09/23 0243)  Set Rate: 16 BPM (07/09/23 0243)  Vt Set: 450 mL (07/09/23 0243)  Pressure Support: 10 cmH20 (07/05/23 0736)  PEEP/CPAP: 10 cmH20 (07/09/23 0243)  Oxygen Concentration (%): 40 (07/09/23 0243)  Peak Airway Pressure: 29 cmH20 (07/09/23 0243)  Total Ve: 13.9 L/m (07/09/23 0243)  F/VT Ratio<105 (RSBI): (!) 87.94 (07/09/23 0243)    Significant Imaging:  I have reviewed the pertinent imaging within the past 24 hours.    Assessment/Plan:     Assessment  Extensive bilateral pulmonary infiltrates  DX:  Infectious pneumonia versus ARDS versus hydrostatic/non hydrostatic pulmonary edema   Sputum culture showed moderate yeast  Intubated 07/04/2023  Prepatellar bursitis/septic arthritis of the right knee (strep agalactiae)   Post I&D on 06/29/2023   Ortho no longer planning for right above-the-knee amputation in near future due to infectious suppression  Acute renal failure, oliguric  Initiation of hemodialysis on 07/04/2023  Acute encephalopathy, likely metabolic related to above, currently clouded by sedation   Reported paroxysmal atrial fibrillation with permenant pacemaker, currently in sinus  rhythm   H/o chornic paraplegia at T1 2/2 spinal cordy injury, neurogenic bladder, chronic right ankle osteomyelitis, DM2, HTN    Plan  -Continue ICU level of care for ongoing monitoring and medical management  -ID, urology, orthopedics, wound care teams following, appreciate their assistance   -HD/Ultrafiltration as per nephrology   -Continue IV meropenem (day 6); no culture growth to date; repeat cultures ordered due to fever overnight  -titrate mechanical ventilation for ARDS net protocol. plan for spontaneous breathing trial     DVT Prophylaxis: LVX 30   GI Prophylaxis: Pepcid 20     32 minutes of critical care was time spent personally by me on the following activities: development of treatment plan with patient or surrogate and bedside caregivers, discussions with consultants, evaluation of patient's response to treatment, examination of patient, ordering and performing treatments and interventions, ordering and review of laboratory studies, ordering and review of radiographic studies, pulse oximetry, re-evaluation of patient's condition.  This critical care time did not overlap with that of any other provider or involve time for any procedures.     Jv Morgan MD  Pulmonary Critical Care Medicine  [facility] - [facility]

## 2023-07-09 NOTE — NURSING
Nurses Note -- 4 Eyes      7/9/2023   3:43 AM      Skin assessed during: Q Shift Change      [] No Altered Skin Integrity Present    []Prevention Measures Documented      [x] Yes- Altered Skin Integrity Present or Discovered   [x] LDA Added if Not in Epic (Describe Wound)   [x] New Altered Skin Integrity was Present on Admit and Documented in LDA   [x] Wound Image Taken    Wound Care Consulted? Yes    Attending Nurse:  Michele Francois RN     Second RN/Staff Member:  YOLIE Corona

## 2023-07-10 LAB
ALBUMIN SERPL-MCNC: 1.9 G/DL (ref 3.5–5)
ALBUMIN SERPL-MCNC: 2.1 G/DL (ref 3.5–5)
ALBUMIN/GLOB SERPL: 0.4 RATIO (ref 1.1–2)
ALBUMIN/GLOB SERPL: 0.5 RATIO (ref 1.1–2)
ALLENS TEST BLOOD GAS (OHS): YES
ALLENS TEST BLOOD GAS (OHS): YES
ALP SERPL-CCNC: 110 UNIT/L (ref 40–150)
ALP SERPL-CCNC: 115 UNIT/L (ref 40–150)
ALT SERPL-CCNC: 11 UNIT/L (ref 0–55)
ALT SERPL-CCNC: 8 UNIT/L (ref 0–55)
AST SERPL-CCNC: 23 UNIT/L (ref 5–34)
AST SERPL-CCNC: 31 UNIT/L (ref 5–34)
BASE EXCESS BLD CALC-SCNC: 0.3 MMOL/L (ref -2–2)
BASE EXCESS BLD CALC-SCNC: 5.9 MMOL/L
BASOPHILS # BLD AUTO: 0.06 X10(3)/MCL
BASOPHILS NFR BLD AUTO: 0.5 %
BILIRUBIN DIRECT+TOT PNL SERPL-MCNC: 0.4 MG/DL
BILIRUBIN DIRECT+TOT PNL SERPL-MCNC: 0.4 MG/DL
BLOOD GAS SAMPLE TYPE (OHS): ABNORMAL
BLOOD GAS SAMPLE TYPE (OHS): ABNORMAL
BUN SERPL-MCNC: 52.8 MG/DL (ref 8.4–25.7)
BUN SERPL-MCNC: 92 MG/DL (ref 8.4–25.7)
CA-I BLD-SCNC: 1.08 MMOL/L (ref 1.12–1.23)
CA-I BLD-SCNC: 1.09 MMOL/L (ref 1.12–1.23)
CALCIUM SERPL-MCNC: 8.1 MG/DL (ref 8.4–10.2)
CALCIUM SERPL-MCNC: 8.2 MG/DL (ref 8.4–10.2)
CHLORIDE SERPL-SCNC: 95 MMOL/L (ref 98–107)
CHLORIDE SERPL-SCNC: 97 MMOL/L (ref 98–107)
CO2 BLDA-SCNC: 27.4 MMOL/L
CO2 BLDA-SCNC: 33.2 MMOL/L
CO2 SERPL-SCNC: 20 MMOL/L (ref 22–29)
CO2 SERPL-SCNC: 24 MMOL/L (ref 22–29)
COHGB MFR BLDA: 1.4 % (ref 0.5–1.5)
CREAT SERPL-MCNC: 3.82 MG/DL (ref 0.73–1.18)
CREAT SERPL-MCNC: 5.9 MG/DL (ref 0.73–1.18)
DRAWN BY BLOOD GAS (OHS): ABNORMAL
DRAWN BY BLOOD GAS (OHS): ABNORMAL
EOSINOPHIL # BLD AUTO: 0.03 X10(3)/MCL (ref 0–0.9)
EOSINOPHIL NFR BLD AUTO: 0.3 %
ERYTHROCYTE [DISTWIDTH] IN BLOOD BY AUTOMATED COUNT: 18.8 % (ref 11.5–17)
FIO2 BLOOD GAS (OHS): 50 %
FIO2 BLOOD GAS (OHS): 60 %
FLOW (OHS): 50 LPM
GFR SERPLBLD CREATININE-BSD FMLA CKD-EPI: 10 MLS/MIN/1.73/M2
GFR SERPLBLD CREATININE-BSD FMLA CKD-EPI: 17 MLS/MIN/1.73/M2
GLOBULIN SER-MCNC: 4.3 GM/DL (ref 2.4–3.5)
GLOBULIN SER-MCNC: 4.6 GM/DL (ref 2.4–3.5)
GLUCOSE SERPL-MCNC: 133 MG/DL (ref 74–100)
GLUCOSE SERPL-MCNC: 147 MG/DL (ref 74–100)
HCO3 BLDA-SCNC: 26 MMOL/L (ref 22–26)
HCO3 BLDA-SCNC: 31.7 MMOL/L
HCT VFR BLD AUTO: 29.9 % (ref 42–52)
HGB BLD-MCNC: 10 G/DL (ref 14–18)
IMM GRANULOCYTES # BLD AUTO: 0.14 X10(3)/MCL (ref 0–0.04)
IMM GRANULOCYTES NFR BLD AUTO: 1.2 %
LYMPHOCYTES # BLD AUTO: 2.18 X10(3)/MCL (ref 0.6–4.6)
LYMPHOCYTES NFR BLD AUTO: 18.6 %
MAGNESIUM SERPL-MCNC: 2.1 MG/DL (ref 1.6–2.6)
MCH RBC QN AUTO: 25.1 PG (ref 27–31)
MCHC RBC AUTO-ENTMCNC: 33.4 G/DL (ref 33–36)
MCV RBC AUTO: 74.9 FL (ref 80–94)
MECH RR (OHS): 16 B/MIN
MECH RR (OHS): 20 B/MIN
MECH VT (OHS): 450 ML
MECH VT (OHS): 450 ML
METHGB MFR BLDA: 0.3 % (ref 0.4–1.5)
MODE (OHS): AC
MODE (OHS): AC
MONOCYTES # BLD AUTO: 1.39 X10(3)/MCL (ref 0.1–1.3)
MONOCYTES NFR BLD AUTO: 11.9 %
NEUTROPHILS # BLD AUTO: 7.91 X10(3)/MCL (ref 2.1–9.2)
NEUTROPHILS NFR BLD AUTO: 67.5 %
NRBC BLD AUTO-RTO: 0 %
O2 HB BLOOD GAS (OHS): 93.9 % (ref 94–97)
OXYGEN DEVICE BLOOD GAS (OHS): ABNORMAL
OXYGEN DEVICE BLOOD GAS (OHS): ABNORMAL
OXYHGB MFR BLDA: 9.2 G/DL (ref 12–16)
PCO2 BLDA: 46 MMHG (ref 35–45)
PCO2 BLDA: 50 MMHG (ref 35–45)
PEEP (OHS): 8 CMH2O
PEEP (OHS): 8 CMH2O
PH BLDA: 7.36 [PH] (ref 7.35–7.45)
PH BLDA: 7.41 [PH] (ref 7.35–7.45)
PHOSPHATE SERPL-MCNC: 5.8 MG/DL (ref 2.3–4.7)
PLATELET # BLD AUTO: 492 X10(3)/MCL (ref 130–400)
PMV BLD AUTO: 10.1 FL (ref 7.4–10.4)
PO2 BLDA: 105 MMHG (ref 80–100)
PO2 BLDA: 76 MMHG (ref 80–100)
POCT GLUCOSE: 131 MG/DL (ref 70–110)
POTASSIUM BLOOD GAS (OHS): 3.5 MMOL/L (ref 3.5–5)
POTASSIUM BLOOD GAS (OHS): 4.8 MMOL/L (ref 3.5–5)
POTASSIUM SERPL-SCNC: 4 MMOL/L (ref 3.5–5.1)
POTASSIUM SERPL-SCNC: 5.2 MMOL/L (ref 3.5–5.1)
PROT SERPL-MCNC: 6.2 GM/DL (ref 6.4–8.3)
PROT SERPL-MCNC: 6.7 GM/DL (ref 6.4–8.3)
RBC # BLD AUTO: 3.99 X10(6)/MCL (ref 4.7–6.1)
SAMPLE SITE BLOOD GAS (OHS): ABNORMAL
SAMPLE SITE BLOOD GAS (OHS): ABNORMAL
SAO2 % BLDA: 94.5 %
SAO2 % BLDA: 98 %
SODIUM BLOOD GAS (OHS): 128 MMOL/L (ref 137–145)
SODIUM BLOOD GAS (OHS): 130 MMOL/L (ref 137–145)
SODIUM SERPL-SCNC: 132 MMOL/L (ref 136–145)
SODIUM SERPL-SCNC: 135 MMOL/L (ref 136–145)
WBC # SPEC AUTO: 11.71 X10(3)/MCL (ref 4.5–11.5)

## 2023-07-10 PROCEDURE — 27000221 HC OXYGEN, UP TO 24 HOURS

## 2023-07-10 PROCEDURE — 99900026 HC AIRWAY MAINTENANCE (STAT)

## 2023-07-10 PROCEDURE — 63600175 PHARM REV CODE 636 W HCPCS: Performed by: STUDENT IN AN ORGANIZED HEALTH CARE EDUCATION/TRAINING PROGRAM

## 2023-07-10 PROCEDURE — 63600175 PHARM REV CODE 636 W HCPCS: Mod: JG | Performed by: INTERNAL MEDICINE

## 2023-07-10 PROCEDURE — 80100014 HC HEMODIALYSIS 1:1

## 2023-07-10 PROCEDURE — 94761 N-INVAS EAR/PLS OXIMETRY MLT: CPT

## 2023-07-10 PROCEDURE — 25000242 PHARM REV CODE 250 ALT 637 W/ HCPCS: Performed by: INTERNAL MEDICINE

## 2023-07-10 PROCEDURE — 25000003 PHARM REV CODE 250: Performed by: INTERNAL MEDICINE

## 2023-07-10 PROCEDURE — 25000003 PHARM REV CODE 250: Performed by: STUDENT IN AN ORGANIZED HEALTH CARE EDUCATION/TRAINING PROGRAM

## 2023-07-10 PROCEDURE — 63600175 PHARM REV CODE 636 W HCPCS: Performed by: INTERNAL MEDICINE

## 2023-07-10 PROCEDURE — 82803 BLOOD GASES ANY COMBINATION: CPT

## 2023-07-10 PROCEDURE — 85025 COMPLETE CBC W/AUTO DIFF WBC: CPT | Performed by: STUDENT IN AN ORGANIZED HEALTH CARE EDUCATION/TRAINING PROGRAM

## 2023-07-10 PROCEDURE — 94003 VENT MGMT INPAT SUBQ DAY: CPT

## 2023-07-10 PROCEDURE — 99900031 HC PATIENT EDUCATION (STAT)

## 2023-07-10 PROCEDURE — 99900035 HC TECH TIME PER 15 MIN (STAT)

## 2023-07-10 PROCEDURE — 80053 COMPREHEN METABOLIC PANEL: CPT | Performed by: INTERNAL MEDICINE

## 2023-07-10 PROCEDURE — 84100 ASSAY OF PHOSPHORUS: CPT | Performed by: INTERNAL MEDICINE

## 2023-07-10 PROCEDURE — 94640 AIRWAY INHALATION TREATMENT: CPT

## 2023-07-10 PROCEDURE — 80053 COMPREHEN METABOLIC PANEL: CPT | Performed by: STUDENT IN AN ORGANIZED HEALTH CARE EDUCATION/TRAINING PROGRAM

## 2023-07-10 PROCEDURE — 36600 WITHDRAWAL OF ARTERIAL BLOOD: CPT

## 2023-07-10 PROCEDURE — 27200966 HC CLOSED SUCTION SYSTEM

## 2023-07-10 PROCEDURE — 83735 ASSAY OF MAGNESIUM: CPT | Performed by: INTERNAL MEDICINE

## 2023-07-10 PROCEDURE — 20000000 HC ICU ROOM

## 2023-07-10 PROCEDURE — 25000003 PHARM REV CODE 250

## 2023-07-10 RX ORDER — LACTULOSE 10 G/15ML
30 SOLUTION ORAL ONCE
Status: COMPLETED | OUTPATIENT
Start: 2023-07-10 | End: 2023-07-10

## 2023-07-10 RX ORDER — AMOXICILLIN 250 MG
2 CAPSULE ORAL 2 TIMES DAILY
Status: DISCONTINUED | OUTPATIENT
Start: 2023-07-10 | End: 2023-07-12

## 2023-07-10 RX ADMIN — PROPOFOL 30 MCG/KG/MIN: 10 INJECTION, EMULSION INTRAVENOUS at 08:07

## 2023-07-10 RX ADMIN — ATORVASTATIN CALCIUM 20 MG: 10 TABLET, FILM COATED ORAL at 08:07

## 2023-07-10 RX ADMIN — DEXMEDETOMIDINE HYDROCHLORIDE 1.4 MCG/KG/HR: 400 INJECTION INTRAVENOUS at 08:07

## 2023-07-10 RX ADMIN — SODIUM BICARBONATE 650 MG TABLET 650 MG: at 08:07

## 2023-07-10 RX ADMIN — DEXMEDETOMIDINE HYDROCHLORIDE 1.4 MCG/KG/HR: 400 INJECTION INTRAVENOUS at 03:07

## 2023-07-10 RX ADMIN — SENNOSIDES AND DOCUSATE SODIUM 2 TABLET: 50; 8.6 TABLET ORAL at 08:07

## 2023-07-10 RX ADMIN — GUAIFENESIN 400 MG: 200 SOLUTION ORAL at 10:07

## 2023-07-10 RX ADMIN — GUAIFENESIN 400 MG: 200 SOLUTION ORAL at 05:07

## 2023-07-10 RX ADMIN — DEXMEDETOMIDINE HYDROCHLORIDE 1.4 MCG/KG/HR: 400 INJECTION INTRAVENOUS at 05:07

## 2023-07-10 RX ADMIN — MEROPENEM 1 G: 1 INJECTION, POWDER, FOR SOLUTION INTRAVENOUS at 08:07

## 2023-07-10 RX ADMIN — PROPOFOL 30 MCG/KG/MIN: 10 INJECTION, EMULSION INTRAVENOUS at 03:07

## 2023-07-10 RX ADMIN — SODIUM CHLORIDE SOLN NEBU 3% 4 ML: 3 NEBU SOLN at 01:07

## 2023-07-10 RX ADMIN — OXYBUTYNIN CHLORIDE 5 MG: 5 TABLET ORAL at 08:07

## 2023-07-10 RX ADMIN — FENOFIBRATE 48 MG: 48 TABLET, FILM COATED ORAL at 08:07

## 2023-07-10 RX ADMIN — SENNOSIDES AND DOCUSATE SODIUM 2 TABLET: 50; 8.6 TABLET ORAL at 12:07

## 2023-07-10 RX ADMIN — MICONAZOLE NITRATE 2 % TOPICAL POWDER: at 08:07

## 2023-07-10 RX ADMIN — GUAIFENESIN 400 MG: 200 SOLUTION ORAL at 01:07

## 2023-07-10 RX ADMIN — CARVEDILOL 6.25 MG: 3.12 TABLET, FILM COATED ORAL at 08:07

## 2023-07-10 RX ADMIN — DOXYCYCLINE HYCLATE 100 MG: 100 TABLET, COATED ORAL at 08:07

## 2023-07-10 RX ADMIN — SODIUM CHLORIDE SOLN NEBU 3% 4 ML: 3 NEBU SOLN at 08:07

## 2023-07-10 RX ADMIN — ACETAMINOPHEN 650 MG: 325 TABLET, FILM COATED ORAL at 08:07

## 2023-07-10 RX ADMIN — EPOETIN ALFA 20000 UNITS: 20000 SOLUTION INTRAVENOUS; SUBCUTANEOUS at 06:07

## 2023-07-10 RX ADMIN — GUAIFENESIN 400 MG: 200 SOLUTION ORAL at 06:07

## 2023-07-10 RX ADMIN — SODIUM CHLORIDE SOLN NEBU 3% 4 ML: 3 NEBU SOLN at 04:07

## 2023-07-10 RX ADMIN — GUAIFENESIN 400 MG: 200 SOLUTION ORAL at 02:07

## 2023-07-10 RX ADMIN — FAMOTIDINE 20 MG: 10 INJECTION, SOLUTION INTRAVENOUS at 08:07

## 2023-07-10 RX ADMIN — ENOXAPARIN SODIUM 30 MG: 30 INJECTION SUBCUTANEOUS at 05:07

## 2023-07-10 RX ADMIN — FUROSEMIDE 40 MG: 10 INJECTION, SOLUTION INTRAMUSCULAR; INTRAVENOUS at 05:07

## 2023-07-10 RX ADMIN — AMLODIPINE BESYLATE 5 MG: 5 TABLET ORAL at 08:07

## 2023-07-10 RX ADMIN — SODIUM CHLORIDE SOLN NEBU 3% 4 ML: 3 NEBU SOLN at 12:07

## 2023-07-10 RX ADMIN — DEXMEDETOMIDINE HYDROCHLORIDE 1.4 MCG/KG/HR: 400 INJECTION INTRAVENOUS at 01:07

## 2023-07-10 RX ADMIN — GUAIFENESIN 400 MG: 200 SOLUTION ORAL at 08:07

## 2023-07-10 RX ADMIN — PROPOFOL 30 MCG/KG/MIN: 10 INJECTION, EMULSION INTRAVENOUS at 05:07

## 2023-07-10 RX ADMIN — SODIUM CHLORIDE SOLN NEBU 3% 4 ML: 3 NEBU SOLN at 07:07

## 2023-07-10 RX ADMIN — DEXMEDETOMIDINE HYDROCHLORIDE 1.4 MCG/KG/HR: 400 INJECTION INTRAVENOUS at 12:07

## 2023-07-10 RX ADMIN — LACTULOSE 30 G: 10 SOLUTION ORAL at 12:07

## 2023-07-10 NOTE — NURSING
Nurses Note -- 4 Eyes      11/09/2023   11:18 AM      Skin assessed during: Q Shift Change      [] No Altered Skin Integrity Present    []Prevention Measures Documented      [x] Yes- Altered Skin Integrity Present or Discovered   [x] LDA Added if Not in Epic (Describe Wound)   [] New Altered Skin Integrity was Present on Admit and Documented in LDA   [x] Wound Image Taken    Wound Care Consulted? Yes    Attending Nurse:  Azalea Knight RN     Second RN/Staff Member:  YOLIE Cotto

## 2023-07-10 NOTE — PHYSICIAN QUERY
PT Name: Bianca Khan  MR #: [id number]     DOCUMENTATION CLARIFICATION     CDS/: Sofiya Gardiner RN           Contact information: [email or web address]  This form is a permanent document in the medical record.     Query Date: July 10, 2023    By submitting this query, we are merely seeking further clarification of documentation.  Please utilize your independent clinical judgment when addressing the question(s) below.    The Medical Record contains the following:   Indicators   Supporting Clinical Findings Location in Medical Record    Non-blanchable erythema/redness     x Ulcer/Injury/Skin Breakdown   Partial thickness tissue loss. Shallow open ulcer with a red or pink wound bed, without slough. Intact or Open/Ruptured Serum-filled blister. 6/30 Wound care RN Note    Deep Tissue Injury      x Wound care consult  Wound care consult - 6/30 6/30 Wound care RN Note    x Acute/Chronic Illness  Patient is a 58-year-old male with a history of 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, neurogenic bladder with suprapubic catheter, DM2, HTN, and additional past medical history as below who presented to the ER complaining that he struck his knee this weekend and has since had pain and swelling to this area.  He does report a prior knee surgery in the same right knee, remotely.     Rt knee bursitis, sepsis, anemia, encephalopathy, metabolic derangements, Decubitus ulceration  7/2 HM H&P    x Medication/Treatment  Dressing - Calcium alginate;Gauze  6/30 Wound care RN Note   x Other  Orientation - Rt posterior buttocks  6/30 Wound care RN Note     The clinical guidelines noted are only a system guideline. It does not replace the providers clinical judgment.    Per the National Pressure Injury Advisory Panel:   A pressure injury is localized damage to the skin and underlying soft tissue usually over a bony prominence or related to a medical or other device. The injury can present as  intact skin or an open ulcer and may be painful. The injury occurs as a result of intense and/or prolonged pressure or pressure in combination with shear. The tolerance of soft tissue for pressure and shear may also be affected by microclimate, nutrition, perfusion, co-morbidities and condition of the soft tissue.       Stage 1 Pressure Injury:  Intact skin with a localized area of non-blanchable erythema, which may appear differently in darkly pigmented skin. Color changes do not include purple or maroon discoloration; these may indicate deep tissue pressure injury.    Stage 2 Pressure Injury:  Partial-thickness loss of skin with exposed dermis. The wound bed is viable, pink or red, moist, and may also present as an intact or ruptured serum-filled blister.    Stage 3 Pressure Injury:  Full-thickness loss of skin, in which adipose (fat) is visible in the ulcer and granulation tissue and epibole (rolled wound edges) are often present. Slough and/or eschar may be visible. Undermining and tunneling may occur.    Stage 4 Pressure Injury:  Full-thickness skin and tissue loss with exposed or directly palpable fascia, muscle, tendon, ligament, cartilage or bone in the ulcer. Slough and/or eschar may be visible. Epibole (rolled edges), undermining and/or tunneling often occur.    Unstageable Pressure Injury:  Full-thickness skin and tissue loss in which the extent of tissue damage within the ulcer cannot be confirmed because it is obscured by slough or eschar. If slough or eschar is removed, a Stage 3 or Stage 4 pressure injury will be revealed.    Deep Tissue Pressure Injury:  Intact or non-intact skin with localized area of persistent non-blanchable deep red, maroon, purple discoloration or epidermal separation revealing a dark wound bed or blood filled blister. This injury results from intense and/or prolonged pressure and shear forces at the bone-muscle interface. The wound may evolve rapidly to reveal the actual  extent of tissue injury, or may resolve without tissue loss. If necrotic tissue, subcutaneous tissue, granulation tissue, fascia, muscle or other underlying structures are visible, this indicates a full thickness pressure injury (Unstageable, Stage 3 or Stage 4). Do not use DTPI to describe vascular, traumatic, neuropathic, or dermatologic conditions.   Medical Device Related Pressure Injury: This describes an etiology. Medical device related pressure injuries result from the use of devices designed and applied for diagnostic or therapeutic purposes. The resultant pressure injury generally conforms to the pattern or shape of the device. The injury should be staged using the staging system.    Mucosal Membrane Pressure Injury: Mucosal membrane pressure injury is found on mucous membranes with a history of a medical device in use at the location of the injury. Due to the anatomy of the tissue these ulcers cannot be staged.       Provider, please provide the integumentary diagnosis related to the documentation of Rt posterior buttock:     [ x  ] Pressure Injury/Decubitus Ulcer, Stage 2   [   ] Other Integumentary Diagnosis (please specify):______________           Please document in your progress notes daily for the duration of treatment until resolved and include in your discharge summary.    Reference:    BETTY Hi., LYNDSEY Ashraf., Goldberg, M., YVONNE Willams., YVONNE Toney., & NIC Thomson. (2016). Revised National Pressure Ulcer Advisory Panel Pressure Injury Staging System: Revised Pressure Injury Staging System. J Wound Ostomy Continence Nurs, 43(6), 585-597. doi:10.1097/won.9576338681397761    Form No.[id number]

## 2023-07-10 NOTE — PHYSICIAN QUERY
PT Name: Bianca Khan  MR #: [id number]     DOCUMENTATION CLARIFICATION     CDS/: Sofiya Gardiner RN       Contact information: [email or web address]  This form is a permanent document in the medical record.     Query Date: July 10, 2023    By submitting this query, we are merely seeking further clarification of documentation.  Please utilize your independent clinical judgment when addressing the question(s) below.    The Medical Record contains the following:   Indicators   Supporting Clinical Findings Location in Medical Record   x Non-blanchable erythema/redness Appearance - Red,moist  6/30 Wound care RN Note   x Ulcer/Injury/Skin Breakdown   Partial thickness tissue loss. Shallow open ulcer with a red or pink wound bed, without slough. Intact or Open/Ruptured Serum-filled blister.  6/30 Wound care RN Note    Deep Tissue Injury      x Wound care consult  Wound care consult - 6/30 6/30 Wound care RN Note   x Acute/Chronic Illness  Patient is a 58-year-old male with a history of 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, neurogenic bladder with suprapubic catheter, DM2, HTN, and additional past medical history as below who presented to the ER complaining that he struck his knee this weekend and has since had pain and swelling to this area.  He does report a prior knee surgery in the same right knee, remotely.     Rt knee bursitis, sepsis, anemia, encephalopathy, metabolic derangements, Decubitus ulceration  7/2 HM H&P   x Medication/Treatment  Dressing - Calcium alginate;Gauze  6/30 Wound care RN Note   x Other  Orientation - Rt lateral ankle  6/30 Wound care RN Note     The clinical guidelines noted are only a system guideline. It does not replace the providers clinical judgment.    Per the National Pressure Injury Advisory Panel:   A pressure injury is localized damage to the skin and underlying soft tissue usually over a bony prominence or related to a medical or  other device. The injury can present as intact skin or an open ulcer and may be painful. The injury occurs as a result of intense and/or prolonged pressure or pressure in combination with shear. The tolerance of soft tissue for pressure and shear may also be affected by microclimate, nutrition, perfusion, co-morbidities and condition of the soft tissue.       Stage 1 Pressure Injury:  Intact skin with a localized area of non-blanchable erythema, which may appear differently in darkly pigmented skin. Color changes do not include purple or maroon discoloration; these may indicate deep tissue pressure injury.    Stage 2 Pressure Injury:  Partial-thickness loss of skin with exposed dermis. The wound bed is viable, pink or red, moist, and may also present as an intact or ruptured serum-filled blister.    Stage 3 Pressure Injury:  Full-thickness loss of skin, in which adipose (fat) is visible in the ulcer and granulation tissue and epibole (rolled wound edges) are often present. Slough and/or eschar may be visible. Undermining and tunneling may occur.    Stage 4 Pressure Injury:  Full-thickness skin and tissue loss with exposed or directly palpable fascia, muscle, tendon, ligament, cartilage or bone in the ulcer. Slough and/or eschar may be visible. Epibole (rolled edges), undermining and/or tunneling often occur.    Unstageable Pressure Injury:  Full-thickness skin and tissue loss in which the extent of tissue damage within the ulcer cannot be confirmed because it is obscured by slough or eschar. If slough or eschar is removed, a Stage 3 or Stage 4 pressure injury will be revealed.    Deep Tissue Pressure Injury:  Intact or non-intact skin with localized area of persistent non-blanchable deep red, maroon, purple discoloration or epidermal separation revealing a dark wound bed or blood filled blister. This injury results from intense and/or prolonged pressure and shear forces at the bone-muscle interface. The wound may  evolve rapidly to reveal the actual extent of tissue injury, or may resolve without tissue loss. If necrotic tissue, subcutaneous tissue, granulation tissue, fascia, muscle or other underlying structures are visible, this indicates a full thickness pressure injury (Unstageable, Stage 3 or Stage 4). Do not use DTPI to describe vascular, traumatic, neuropathic, or dermatologic conditions.   Medical Device Related Pressure Injury: This describes an etiology. Medical device related pressure injuries result from the use of devices designed and applied for diagnostic or therapeutic purposes. The resultant pressure injury generally conforms to the pattern or shape of the device. The injury should be staged using the staging system.    Mucosal Membrane Pressure Injury: Mucosal membrane pressure injury is found on mucous membranes with a history of a medical device in use at the location of the injury. Due to the anatomy of the tissue these ulcers cannot be staged.       Provider, please provide the integumentary diagnosis related to the documentation of Right lateral ankle:     [ x  ] Pressure Injury/Decubitus Ulcer, Stage 2   [   ] Other Integumentary Diagnosis (please specify):______________           Please document in your progress notes daily for the duration of treatment until resolved and include in your discharge summary.    Reference:    BETTY Hi., LYNDSEY Ashraf., Goldberg, M., YVONNE Willams., YVONNE Toney., & NIC Thomson. (2016). Revised National Pressure Ulcer Advisory Panel Pressure Injury Staging System: Revised Pressure Injury Staging System. J Wound Ostomy Continence Nurs, 43(6), 585-597. doi:10.1097/won.[id number]    Form No.[id number]

## 2023-07-10 NOTE — NURSING
07/10/23 1209   Post-Hemodialysis Assessment   Blood Volume Processed (Liters) 70.1 L   Dialyzer Clearance Lightly streaked   Duration of Treatment 210 minutes   Total UF (mL) 3000 mL   Patient Response to Treatment PT responded to tx well. No signs of distress.   Post-Hemodialysis Comments 3hr 30min tx, 3L net removed. CVC worked ok, very sensitive. Post tx VS at 1216: BP-133/74, HR-61, temp-98.0 (oral), resp-20.

## 2023-07-10 NOTE — PHYSICIAN QUERY
PT Name: Bianca Khan  MR #: [id number]     DOCUMENTATION CLARIFICATION     CDS/: Sofiya Gardiner RN           Contact information: [email or web address]  This form is a permanent document in the medical record.     Query Date: July 10, 2023    By submitting this query, we are merely seeking further clarification of documentation.  Please utilize your independent clinical judgment when addressing the question(s) below.    The Medical Record contains the following:   Indicators   Supporting Clinical Findings Location in Medical Record    Non-blanchable erythema/redness     x Ulcer/Injury/Skin Breakdown  Partial thickness tissue loss. Shallow open ulcer with a red or pink wound bed, without slough. Intact or Open/Ruptured Serum-filled blister.  6/30 Wound care RN Note    Deep Tissue Injury     x Wound care consult  Wound care consult - 6/30 6/30 Wound care RN Note   x Acute/Chronic Illness  Patient is a 58-year-old male with a history of 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, neurogenic bladder with suprapubic catheter, DM2, HTN, and additional past medical history as below who presented to the ER complaining that he struck his knee this weekend and has since had pain and swelling to this area.  He does report a prior knee surgery in the same right knee, remotely.     Rt knee bursitis, sepsis, anemia, encephalopathy, metabolic derangements, Decubitus ulceration  7/2 HM H&P   x Medication/Treatment  Cleansed with: Sterile normal saline  6/30 Wound care RN Note   x Other  Orientation - Midline perineum 6/30 Wound care RN Note     The clinical guidelines noted are only a system guideline. It does not replace the providers clinical judgment.    Per the National Pressure Injury Advisory Panel:   A pressure injury is localized damage to the skin and underlying soft tissue usually over a bony prominence or related to a medical or other device. The injury can present as intact  skin or an open ulcer and may be painful. The injury occurs as a result of intense and/or prolonged pressure or pressure in combination with shear. The tolerance of soft tissue for pressure and shear may also be affected by microclimate, nutrition, perfusion, co-morbidities and condition of the soft tissue.       Stage 1 Pressure Injury:  Intact skin with a localized area of non-blanchable erythema, which may appear differently in darkly pigmented skin. Color changes do not include purple or maroon discoloration; these may indicate deep tissue pressure injury.    Stage 2 Pressure Injury:  Partial-thickness loss of skin with exposed dermis. The wound bed is viable, pink or red, moist, and may also present as an intact or ruptured serum-filled blister.    Stage 3 Pressure Injury:  Full-thickness loss of skin, in which adipose (fat) is visible in the ulcer and granulation tissue and epibole (rolled wound edges) are often present. Slough and/or eschar may be visible. Undermining and tunneling may occur.    Stage 4 Pressure Injury:  Full-thickness skin and tissue loss with exposed or directly palpable fascia, muscle, tendon, ligament, cartilage or bone in the ulcer. Slough and/or eschar may be visible. Epibole (rolled edges), undermining and/or tunneling often occur.    Unstageable Pressure Injury:  Full-thickness skin and tissue loss in which the extent of tissue damage within the ulcer cannot be confirmed because it is obscured by slough or eschar. If slough or eschar is removed, a Stage 3 or Stage 4 pressure injury will be revealed.    Deep Tissue Pressure Injury:  Intact or non-intact skin with localized area of persistent non-blanchable deep red, maroon, purple discoloration or epidermal separation revealing a dark wound bed or blood filled blister. This injury results from intense and/or prolonged pressure and shear forces at the bone-muscle interface. The wound may evolve rapidly to reveal the actual extent of  tissue injury, or may resolve without tissue loss. If necrotic tissue, subcutaneous tissue, granulation tissue, fascia, muscle or other underlying structures are visible, this indicates a full thickness pressure injury (Unstageable, Stage 3 or Stage 4). Do not use DTPI to describe vascular, traumatic, neuropathic, or dermatologic conditions.   Medical Device Related Pressure Injury: This describes an etiology. Medical device related pressure injuries result from the use of devices designed and applied for diagnostic or therapeutic purposes. The resultant pressure injury generally conforms to the pattern or shape of the device. The injury should be staged using the staging system.    Mucosal Membrane Pressure Injury: Mucosal membrane pressure injury is found on mucous membranes with a history of a medical device in use at the location of the injury. Due to the anatomy of the tissue these ulcers cannot be staged.       Provider, please provide the integumentary diagnosis related to the documentation of Midline perineum:     [ x  ] Pressure Injury/Decubitus Ulcer, Stage 2   [   ] Other Integumentary Diagnosis (please specify):______________       Present on admission (POA) status:        Please document in your progress notes daily for the duration of treatment until resolved and include in your discharge summary.    Reference:    BETTY Hi., LYNDSEY Ashraf., Goldberg, M., YVONNE Willams., YVONNE Toney., & NIC Thomson. (2016). Revised National Pressure Ulcer Advisory Panel Pressure Injury Staging System: Revised Pressure Injury Staging System. J Wound Ostomy Continence Nurs, 43(6), 585-597. doi:10.1097/won.[id number]    Form No.[id number]

## 2023-07-10 NOTE — PHYSICIAN QUERY
PT Name: Bianca Khan  MR #: [id number]     DOCUMENTATION CLARIFICATION     CDS/: Sofiya Gardiner RN          Contact information: [email or web address]  This form is a permanent document in the medical record.     Query Date: July 10, 2023    By submitting this query, we are merely seeking further clarification of documentation.  Please utilize your independent clinical judgment when addressing the question(s) below.    The Medical Record contains the following:   Indicators   Supporting Clinical Findings Location in Medical Record   x Non-blanchable erythema/redness  Appearance - Pink, red, moist  6/30 Wound care RN Note   x Ulcer/Injury/Skin Breakdown  Partial thickness tissue loss. Shallow open ulcer with a red or pink wound bed, without slough. Intact or Open/Ruptured Serum-filled blister. 6/30 Wound care RN Note    Deep Tissue Injury     x Wound care consult  Wound length - 3 cm  Wound width - 3 cm  Surface area - 9 cm2  6/30 Wound care RN Note   x Acute/Chronic Illness  Patient is a 58-year-old male with a history of 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, neurogenic bladder with suprapubic catheter, DM2, HTN, and additional past medical history as below who presented to the ER complaining that he struck his knee this weekend and has since had pain and swelling to this area.  He does report a prior knee surgery in the same right knee, remotely.     Rt knee bursitis, sepsis, anemia, encephalopathy, metabolic derangements  7/2 HM H&P   x Medication/Treatment  Dressing - Calcium alginate;Gauze  6/30 Wound care RN Note   x Other  Orientation - Left posterior buttock 6/30 Wound care RN Note     The clinical guidelines noted are only a system guideline. It does not replace the providers clinical judgment.    Per the National Pressure Injury Advisory Panel:   A pressure injury is localized damage to the skin and underlying soft tissue usually over a bony prominence or  related to a medical or other device. The injury can present as intact skin or an open ulcer and may be painful. The injury occurs as a result of intense and/or prolonged pressure or pressure in combination with shear. The tolerance of soft tissue for pressure and shear may also be affected by microclimate, nutrition, perfusion, co-morbidities and condition of the soft tissue.       Stage 1 Pressure Injury:  Intact skin with a localized area of non-blanchable erythema, which may appear differently in darkly pigmented skin. Color changes do not include purple or maroon discoloration; these may indicate deep tissue pressure injury.    Stage 2 Pressure Injury:  Partial-thickness loss of skin with exposed dermis. The wound bed is viable, pink or red, moist, and may also present as an intact or ruptured serum-filled blister.    Stage 3 Pressure Injury:  Full-thickness loss of skin, in which adipose (fat) is visible in the ulcer and granulation tissue and epibole (rolled wound edges) are often present. Slough and/or eschar may be visible. Undermining and tunneling may occur.    Stage 4 Pressure Injury:  Full-thickness skin and tissue loss with exposed or directly palpable fascia, muscle, tendon, ligament, cartilage or bone in the ulcer. Slough and/or eschar may be visible. Epibole (rolled edges), undermining and/or tunneling often occur.    Unstageable Pressure Injury:  Full-thickness skin and tissue loss in which the extent of tissue damage within the ulcer cannot be confirmed because it is obscured by slough or eschar. If slough or eschar is removed, a Stage 3 or Stage 4 pressure injury will be revealed.    Deep Tissue Pressure Injury:  Intact or non-intact skin with localized area of persistent non-blanchable deep red, maroon, purple discoloration or epidermal separation revealing a dark wound bed or blood filled blister. This injury results from intense and/or prolonged pressure and shear forces at the bone-muscle  interface. The wound may evolve rapidly to reveal the actual extent of tissue injury, or may resolve without tissue loss. If necrotic tissue, subcutaneous tissue, granulation tissue, fascia, muscle or other underlying structures are visible, this indicates a full thickness pressure injury (Unstageable, Stage 3 or Stage 4). Do not use DTPI to describe vascular, traumatic, neuropathic, or dermatologic conditions.   Medical Device Related Pressure Injury: This describes an etiology. Medical device related pressure injuries result from the use of devices designed and applied for diagnostic or therapeutic purposes. The resultant pressure injury generally conforms to the pattern or shape of the device. The injury should be staged using the staging system.    Mucosal Membrane Pressure Injury: Mucosal membrane pressure injury is found on mucous membranes with a history of a medical device in use at the location of the injury. Due to the anatomy of the tissue these ulcers cannot be staged.       Provider, please provide the integumentary diagnosis related to the documentation of Left posterior buttock:     [ x  ] Pressure Injury/Decubitus Ulcer, Stage 2   [   ] Other Integumentary Diagnosis (please specify):______________           Please document in your progress notes daily for the duration of treatment until resolved and include in your discharge summary.    Reference:    BETTY Hi., LYNDSEY Ashraf., Goldberg, M., YVONNE Willams., YVONNE Toney., & NIC Thomson. (2016). Revised National Pressure Ulcer Advisory Panel Pressure Injury Staging System: Revised Pressure Injury Staging System. J Wound Ostomy Continence Nurs, 43(6), 585-597. doi:10.1097/won.[id number]    Form No.[id number]

## 2023-07-10 NOTE — PHYSICIAN QUERY
PT Name: Bianca Khan  MR #: [id number]     DOCUMENTATION CLARIFICATION     CDS/: Sofiya Gardiner RN         Contact information: [email or web address]  This form is a permanent document in the medical record.     Query Date: July 10, 2023    By submitting this query, we are merely seeking further clarification of documentation.  Please utilize your independent clinical judgment when addressing the question(s) below.    The Medical Record contains the following:   Indicators   Supporting Clinical Findings Location in Medical Record   x Non-blanchable erythema/redness  Appearance - Pink and dry  6/30 Wound care RN Note   x Ulcer/Injury/Skin Breakdown  Description - Partial thickness tissue loss. Shallow open ulcer with a red or pink wound bed, without slough. 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  He does report a prior knee surgery in the same right knee, remotely.    Rt knee bursitis, sepsis, anemia, encephalopathy, metabolic derangements  7/2 HM H&P   x Medication/Treatment   Dressing -  Calcium Alginate gauze  6/30 Wound care RN Note   x Other  Orientation - Medial sacral spine  Altered Skin Integrity Present on Admission - Did Patient arrive to the hospital with altered skin?: yes    6/30 Wound care RN Note     The clinical guidelines noted are only a system guideline. It does not replace the providers clinical judgment.    Per the National Pressure Injury  Advisory Panel:   A pressure injury is localized damage to the skin and underlying soft tissue usually over a bony prominence or related to a medical or other device. The injury can present as intact skin or an open ulcer and may be painful. The injury occurs as a result of intense and/or prolonged pressure or pressure in combination with shear. The tolerance of soft tissue for pressure and shear may also be affected by microclimate, nutrition, perfusion, co-morbidities and condition of the soft tissue.       Stage 1 Pressure Injury:  Intact skin with a localized area of non-blanchable erythema, which may appear differently in darkly pigmented skin. Color changes do not include purple or maroon discoloration; these may indicate deep tissue pressure injury.    Stage 2 Pressure Injury:  Partial-thickness loss of skin with exposed dermis. The wound bed is viable, pink or red, moist, and may also present as an intact or ruptured serum-filled blister.    Stage 3 Pressure Injury:  Full-thickness loss of skin, in which adipose (fat) is visible in the ulcer and granulation tissue and epibole (rolled wound edges) are often present. Slough and/or eschar may be visible. Undermining and tunneling may occur.    Stage 4 Pressure Injury:  Full-thickness skin and tissue loss with exposed or directly palpable fascia, muscle, tendon, ligament, cartilage or bone in the ulcer. Slough and/or eschar may be visible. Epibole (rolled edges), undermining and/or tunneling often occur.    Unstageable Pressure Injury:  Full-thickness skin and tissue loss in which the extent of tissue damage within the ulcer cannot be confirmed because it is obscured by slough or eschar. If slough or eschar is removed, a Stage 3 or Stage 4 pressure injury will be revealed.    Deep Tissue Pressure Injury:  Intact or non-intact skin with localized area of persistent non-blanchable deep red, maroon, purple discoloration or epidermal separation revealing a dark  wound bed or blood filled blister. This injury results from intense and/or prolonged pressure and shear forces at the bone-muscle interface. The wound may evolve rapidly to reveal the actual extent of tissue injury, or may resolve without tissue loss. If necrotic tissue, subcutaneous tissue, granulation tissue, fascia, muscle or other underlying structures are visible, this indicates a full thickness pressure injury (Unstageable, Stage 3 or Stage 4). Do not use DTPI to describe vascular, traumatic, neuropathic, or dermatologic conditions.   Medical Device Related Pressure Injury: This describes an etiology. Medical device related pressure injuries result from the use of devices designed and applied for diagnostic or therapeutic purposes. The resultant pressure injury generally conforms to the pattern or shape of the device. The injury should be staged using the staging system.    Mucosal Membrane Pressure Injury: Mucosal membrane pressure injury is found on mucous membranes with a history of a medical device in use at the location of the injury. Due to the anatomy of the tissue these ulcers cannot be staged.       Provider, please provide the integumentary diagnosis related to the documentation of Medial Sacral Spine:     [x   ] Pressure Injury/Decubitus Ulcer, Stage 2   [   ] Other Integumentary Diagnosis (please specify):______________           Please document in your progress notes daily for the duration of treatment until resolved and include in your discharge summary.    Reference:    BETTY Hi., Andriy, KATERYNA. NIC., Goldberg, M., YVONNE Willams., YVONNE Toney., & NIC Thomson. (2016). Revised National Pressure Ulcer Advisory Panel Pressure Injury Staging System: Revised Pressure Injury Staging System. J Wound Ostomy Continence Nurs, 43(6), 585-597. doi:10.1097/[email or web address]    Form No.[id number]

## 2023-07-10 NOTE — PROGRESS NOTES
[facility] - [facility] ICU  Pulmonary Critical Care Note    Patient Name: Bianca Khan  MRN: [id number]  Admission Date: 6/28/2023  Hospital Length of Stay: 12 days  Code Status: Full Code  Attending Provider: Khris Treadwell Jr., MD, *  Primary Care Provider: Areli Vargas PA-C     Subjective:     HPI:   Bianca Khan is a 58 y.o. male with PMH of paraplegia 2/2 spinal cord injury (T1-T6), neurogenic bladder with suprapubic catheter, chronic right ankle osteomyelitis, DM II, HTN, who presented to the ED on 6/28/2023 with CC of right knee pain. Per chart review, CT of right knee revealed 5 cm area of subcutaneous fluid in prepatellar region which was aspirated in the ED; he was taken to the OR on 6/29/2023 by orthopedic surgery team and was found to have prepatellar bursa infection and septic arthritis. Wound culture 6/29/2023 positive for strep agalactiae. Orthopedic surgery team recommended right-sided above-knee amputation d/t chronically infected hardware in right lower extremity. On 7/4/2023, a RRT was called d/t acute respiratory distress that was not improving despite being placed on vapotherm at 35 L/min with FiO2 100%. At the time of examination, patient's initial GCS was 10 but progressively reduced to a GCS of 6. Shared decision with patient's wife was made to intubate patient for airway protection though ABGs were within normal limits. He is admitted to the ICU for close monitoring and further medical management.    Hospital Course/Significant events:  6/29/2023 - I&D of right knee  7/4/2023 - Admitted to ICU, intubated for airway protection d/t altered mental status. Trialysis catheter placed and HD began       24 Hour Interval History:  Patient had episode of fever yesterday AM with Tmax 100.6. Repeat blood cultures drawn the night before are NG@24hr. CXR the day prior showed bilateral patchy opacities though no definitive infiltrates. Patient remains on Meropenum and Doxycycline day  7. Patient still on mechanical ventilation. Precedex at 1.4 and Propofol at 30. Patient with 1.1 L urine output throughout day yesterday. Labs with improving WBC 11.7. Potassium elevated and BUN/Cr worsen to 92/5.9. Nephrology following with plans to continue MWF dialysis schedule which should help to correct lab abnormalities.    Past Medical History:   Diagnosis Date    Arthritis     Chronic ulcer of ankle 05/26/2022    Frequent UTI 07/02/2019    Generalized anxiety disorder 05/26/2022    Neurogenic bladder 05/26/2022    Osteomyelitis 05/26/2022    Presence of suprapubic catheter 05/26/2022    Pure hypercholesterolemia 05/26/2022    Retention of urine, unspecified 08/09/2019    Spinal cord injury at T1-T6 level 04/20/2018       Past Surgical History:   Procedure Laterality Date    INCISION AND DRAINAGE, LOWER EXTREMITY Right 6/29/2023    Procedure: INCISION AND DRAINAGE, LOWER EXTREMITY;  Surgeon: Prabhu Shen DO;  Location: [facility];  Service: Orthopedics;  Laterality: Right;  supine bone foam wash stuff cultures    INSERTION OF INTRAMEDULLARY MARISA Right 8/10/2022    Procedure: INSERTION, INTRAMEDULLARY MARISA RIGHT TIBIA;  Surgeon: Jorge Orellana MD;  Location: [facility];  Service: Orthopedics;  Laterality: Right;       Social History     Socioeconomic History    Marital status:    Tobacco Use    Smoking status: Every Day     Types: Cigars, Cigarettes    Smokeless tobacco: Never   Substance and Sexual Activity    Alcohol use: Yes     Alcohol/week: 2.0 standard drinks     Types: 2 Cans of beer per week    Drug use: Not Currently    Sexual activity: Never       Current Outpatient Medications   Medication Instructions    amitriptyline (ELAVIL) 50 mg, Oral, Nightly    amLODIPine (NORVASC) 10 MG tablet 1 tablet    atorvastatin (LIPITOR) 20 mg, Oral, Nightly    busPIRone (BUSPAR) 5 MG Tab 1 tablet    carvediloL (COREG) 12.5 mg, Oral    cyclobenzaprine (FLEXERIL) 10 mg, Oral, 2 times daily PRN    doxycycline  (VIBRAMYCIN) 100 mg, Oral, Daily    fenofibrate 160 mg, Oral    FLUoxetine 20 mg, Oral    gabapentin (NEURONTIN) 300 MG capsule 1 capsule    lisinopriL 10 mg, Oral, Daily    LORazepam (ATIVAN) 1 mg, Oral, 2 times daily    melatonin (MELATIN) 3 mg tablet TAKE TWO TABLETS BY MOUTH EVERY NIGHT AT BEDTIME AS NEEDED FOR INSOMNIA.    meloxicam (MOBIC) 15 mg, Oral, Daily    metFORMIN (GLUCOPHAGE) 500 MG tablet SMARTSI Tablet(s) By Mouth Morning-Evening    oxybutynin (DITROPAN) 5 mg, Oral, 2 times daily    oxyCODONE-acetaminophen (PERCOCET)  mg per tablet 1 tablet, Oral, Every 6 hours PRN    pantoprazole (PROTONIX) 40 MG tablet 1 tablet    temazepam (RESTORIL) 30 mg, Oral, Nightly    traZODone (DESYREL) 50 mg, Oral, Nightly    XARELTO 20 mg Tab SMARTSI Tablet(s) By Mouth Every Evening     Current Inpatient Medications   amLODIPine  5 mg Oral Daily    atorvastatin  20 mg Oral Nightly    carvediloL  6.25 mg Oral BID    doxycycline  100 mg Oral Q12H    enoxaparin  30 mg Subcutaneous Daily    famotidine (PF)  20 mg Intravenous Daily    fenofibrate  48 mg Oral Daily    furosemide (LASIX) injection  40 mg Intravenous BID    guaiFENesin 100 mg/5 ml  400 mg Per NG tube Q4H    meropenem (MERREM) IVPB  1 g Intravenous Q12H    miconazole NITRATE 2 %   Topical (Top) BID    oxybutynin  5 mg Oral BID    sodium bicarbonate  650 mg Oral BID    sodium chloride 3%  4 mL Nebulization Q4H    sodium citrate-citric acid 500-334 mg/5 ml  30 mL Oral Once     Current Intravenous Infusions   dexmedeTOMIDine (Precedex) infusion (titrating) 1.4 mcg/kg/hr (07/10/23 0121)    NORepinephrine bitartrate-D5W 0.02 mcg/kg/min (23 1315)    propofoL 30 mcg/kg/min (23 1707)       Objective:     Intake/Output Summary (Last 24 hours) at 7/10/2023 0148  Last data filed at 2023 1808  Gross per 24 hour   Intake 868 ml   Output 990 ml   Net -122 ml       Vital Signs (Most Recent):  Temp: 99.4 °F (37.4 °C) (23)  Pulse: 93  (07/10/23 0038)  Resp: (!) 32 (07/10/23 0038)  BP: (!) 152/91 (07/10/23 0000)  SpO2: (!) 90 % (07/10/23 0038)  Body mass index is 27.51 kg/m².  Weight: 86.2 kg (190 lb) Vital Signs (24h Range):  Temp:  [99.4 °F (37.4 °C)-101.8 °F (38.8 °C)] 99.4 °F (37.4 °C)  Pulse:  [] 93  Resp:  [20-36] 32  SpO2:  [90 %-97 %] 90 %  BP: (124-160)/(73-97) 152/91     Physical Exam  Vitals and nursing note reviewed.   Constitutional:       Interventions: He is sedated and intubated.   HENT:      Head: Normocephalic and atraumatic.      Nose: Nose normal.   Cardiovascular:      Rate and Rhythm: Normal rate and regular rhythm.      Pulses: Normal pulses.      Heart sounds: Normal heart sounds.   Pulmonary:      Effort: He is intubated.      Comments: Coarse lung sounds heard throughout the anterior chest.   Abdominal:      General: Bowel sounds are normal.      Palpations: Abdomen is soft.   Musculoskeletal:      Cervical back: Neck supple.   Skin:     General: Skin is warm and dry.   Neurological:      Comments: Unable to assess 2/2 patient sedation status at the time of exam   Psychiatric:      Comments: Unable to assess 2/2 patient sedation status at the time of exam       Lines/Drains/Airways       Central Venous Catheter Line  Duration                  Hemodialysis Catheter 07/04/23 0900 right internal jugular 5 days              Drain  Duration                  NG/OG Tube 07/04/23 0653 Center mouth 5 days         Suprapubic Catheter 07/06/23 1544 100% silicone 22 Fr. 3 days              Airway  Duration                  Airway - Non-Surgical 07/04/23 0422 Endotracheal Tube 5 days              Peripheral Intravenous Line  Duration                  Midline Catheter Insertion/Assessment  - Single Lumen 07/02/23 2128 Right basilic vein (medial side of arm) 7 days         Peripheral IV - Single Lumen 07/04/23 0600 18 G Anterior;Left Forearm 5 days         Peripheral IV - Single Lumen 07/04/23 0600 20 G Left;Posterior Hand 5 days                   Significant Labs:  Lab Results   Component Value Date    WBC 12.19 (H) 07/09/2023    HGB 10.7 (L) 07/09/2023    HCT 31.9 (L) 07/09/2023    MCV 75.2 (L) 07/09/2023     (H) 07/09/2023       BMP  Lab Results   Component Value Date     (L) 07/09/2023    K 5.1 07/09/2023    CHLORIDE 93 (L) 07/09/2023    CO2 22 07/09/2023    BUN 57.1 (H) 07/09/2023    CREATININE 4.04 (H) 07/09/2023    CALCIUM 8.6 07/09/2023    AGAP 9.0 08/29/2022    EGFRNONAA >60 04/27/2022     ABG  Recent Labs   Lab 07/09/23 0534   PH 7.400   PO2 73.0*   HCO3 27.3*       Mechanical Ventilation Support:  Vent Mode: A/C (07/10/23 0038)  Set Rate: 16 BPM (07/10/23 0038)  Vt Set: 450 mL (07/10/23 0038)  Pressure Support: 10 cmH20 (07/05/23 0736)  PEEP/CPAP: 8 cmH20 (07/10/23 0038)  Oxygen Concentration (%): 40 (07/10/23 0038)  Peak Airway Pressure: 25 cmH20 (07/10/23 0038)  Total Ve: 12.8 L/m (07/10/23 0038)  F/VT Ratio<105 (RSBI): (!) 83.55 (07/10/23 0038)    Significant Imaging:  I have reviewed the pertinent imaging within the past 24 hours.    Assessment/Plan:     Assessment  Extensive bilateral pulmonary infiltrates  DX:  Infectious pneumonia versus ARDS versus hydrostatic/non hydrostatic pulmonary edema   Sputum culture showed moderate yeast  Intubated 07/04/2023  Prepatellar bursitis/septic arthritis of the right knee (strep agalactiae)   Post I&D on 06/29/2023   Ortho no longer planning for right above-the-knee amputation in near future due to infectious suppression  Acute renal failure, oliguric  Initiation of hemodialysis on 07/04/2023  Acute encephalopathy, likely metabolic related to above, currently clouded by sedation   Reported paroxysmal atrial fibrillation with permenant pacemaker, currently in sinus rhythm   H/o chornic paraplegia at T1 2/2 spinal cordy injury, neurogenic bladder, chronic right ankle osteomyelitis, DM2, HTN    Plan  -Continue ICU level of care for ongoing monitoring and medical  management  -ID, urology, orthopedics, wound care teams following, appreciate their assistance   -HD/Ultrafiltration as per nephrology   -Continue IV meropenem and doxycycline (day 7); no culture growth to date (drawn 7/4/23); repeat cultures (7/9/23) NG@24hr  -titrate mechanical ventilation for ARDS net protocol. plan for spontaneous breathing trial     DVT Prophylaxis: LVX 30   GI Prophylaxis: Pepcid 20     32 minutes of critical care was time spent personally by me on the following activities: development of treatment plan with patient or surrogate and bedside caregivers, discussions with consultants, evaluation of patient's response to treatment, examination of patient, ordering and performing treatments and interventions, ordering and review of laboratory studies, ordering and review of radiographic studies, pulse oximetry, re-evaluation of patient's condition.  This critical care time did not overlap with that of any other provider or involve time for any procedures.     Jv Morgan MD  Pulmonary Critical Care Medicine  [facility] - [facility] ICU

## 2023-07-10 NOTE — NURSING
Nurses Note -- 4 Eyes      7/10/2023   11:22 AM      Skin assessed during: Q Shift Change      [] No Altered Skin Integrity Present    []Prevention Measures Documented      [x] Yes- Altered Skin Integrity Present or Discovered   [] LDA Added if Not in Epic (Describe Wound)   [] New Altered Skin Integrity was Present on Admit and Documented in LDA   [x] Wound Image Taken    Wound Care Consulted? Yes    Attending Nurse:  Azalea Knight RN     Second RN/Staff Member:  YOLIE Cotto

## 2023-07-10 NOTE — PROGRESS NOTES
Progress Note  Nephrology    Admit Date: 6/28/2023   LOS: 12 days     SUBJECTIVE:     Follow-up For:  ANTON / ATN    Interval History:  57 Y/O male with history of paraplegia with neurogenic bladder with supra pubic cath and frequent infection admitted to hospital for Right knee infection and need of antibiotics   Pt also had oliguria with increasing BUN and CR   Creatinin was 8.12  started on hemodialysis   Had HD today and 3 liter of fluid removed   Still intubated and on vent     Review of Systems:  Nurse reports no new problem   Constitutional: No fever or chills  Respiratory: No cough or shortness of breath , on vent   Cardiovascular: No chest pain or palpitations  Gastrointestinal: No nausea or vomiting , is on NG tube feeding   Neurological: sedated , on vent     OBJECTIVE:     Vital Signs Range (Last 24H):  Vitals:    07/10/23 1632   BP:    Pulse: 64   Resp: 20   Temp:        Temp:  [99.1 °F (37.3 °C)-100.7 °F (38.2 °C)]   Pulse:  [60-97]   Resp:  [18-32]   BP: ()/(60-99)   SpO2:  [90 %-100 %]     I & O (Last 24H):  Intake/Output Summary (Last 24 hours) at 7/10/2023 1705  Last data filed at 7/10/2023 1209  Gross per 24 hour   Intake 1982 ml   Output 3905 ml   Net -1923 ml       Physical Exam:  Sedated , on vent , intubated   Head   normal   Neck   supple   Chest   symmetric   Lungs   clear   Heart   RRR  Abdomen   soft , non tender   Ext   no edema   Urine out put 300 cc for last 24 hrs    Laboratory Data:    Recent Labs   Lab 07/09/23  0037 07/10/23  0037   * 132*   K 5.1 5.2*   CO2 22 20*   BUN 57.1* 92.0*   CREATININE 4.04* 5.90*   GLUCOSE 120* 147*   CALCIUM 8.6 8.1*   PHOS 5.6*  --      Lab Results   Component Value Date    .3 (H) 07/04/2023    CALCIUM 8.1 (L) 07/10/2023    CAION 1.09 (L) 07/10/2023    PHOS 5.6 (H) 07/09/2023     Lab Results   Component Value Date    IRON 35 (L) 12/30/2021    TIBC 284 12/30/2021    FERRITIN 24.99 12/30/2021       Medications:  Medication list was  reviewed and changes noted under Assessment/Plan.    Diagnostic Results:    Reviewed most recent CT/US/Echo/MRI    ASSESSMENT/PLAN:   1   ANTON / ATN  2   CKD , stage unspecified  3   SHPT  4   paraplegia  5   neurogenic bladder . S/P suprapubic cath  6   Anemia  7   HTN  8   DM 2  9   A Fib  10  Right knee infection   12   respiratory failure / on vent     Plan   labs in AM

## 2023-07-11 LAB
ALBUMIN SERPL-MCNC: 2 G/DL (ref 3.5–5)
ALLENS TEST BLOOD GAS (OHS): YES
ALLENS TEST BLOOD GAS (OHS): YES
BASE EXCESS BLD CALC-SCNC: 5.9 MMOL/L
BASE EXCESS BLD CALC-SCNC: 6.1 MMOL/L
BLOOD GAS SAMPLE TYPE (OHS): ABNORMAL
BLOOD GAS SAMPLE TYPE (OHS): ABNORMAL
BUN SERPL-MCNC: 75.8 MG/DL (ref 8.4–25.7)
CA-I BLD-SCNC: 1.12 MMOL/L (ref 1.12–1.23)
CA-I BLD-SCNC: 1.13 MMOL/L (ref 1.12–1.23)
CALCIUM SERPL-MCNC: 8.1 MG/DL (ref 8.4–10.2)
CHLORIDE SERPL-SCNC: 97 MMOL/L (ref 98–107)
CO2 BLDA-SCNC: 32.6 MMOL/L
CO2 BLDA-SCNC: 33.2 MMOL/L
CO2 SERPL-SCNC: 22 MMOL/L (ref 22–29)
CPAP BLOOD GAS (OHS): 5 CM H2O
CREAT SERPL-MCNC: 4.3 MG/DL (ref 0.73–1.18)
DRAWN BY BLOOD GAS (OHS): ABNORMAL
DRAWN BY BLOOD GAS (OHS): ABNORMAL
FIO2 BLOOD GAS (OHS): 40 %
FIO2 BLOOD GAS (OHS): 40 %
GFR SERPLBLD CREATININE-BSD FMLA CKD-EPI: 15 MLS/MIN/1.73/M2
GLUCOSE SERPL-MCNC: 121 MG/DL (ref 74–100)
HCO3 BLDA-SCNC: 31.2 MMOL/L
HCO3 BLDA-SCNC: 31.7 MMOL/L
MECH RR (OHS): 20 B/MIN
MECH VT (OHS): 450 ML
MODE (OHS): ABNORMAL
MODE (OHS): AC
OXYGEN DEVICE BLOOD GAS (OHS): ABNORMAL
OXYGEN DEVICE BLOOD GAS (OHS): ABNORMAL
PCO2 BLDA: 46 MMHG (ref 35–45)
PCO2 BLDA: 50 MMHG (ref 35–45)
PEEP (OHS): 8 CMH2O
PH BLDA: 7.41 [PH] (ref 7.35–7.45)
PH BLDA: 7.44 [PH] (ref 7.35–7.45)
PHOSPHATE SERPL-MCNC: 6.5 MG/DL (ref 2.3–4.7)
PO2 BLDA: 53 MMHG (ref 80–100)
PO2 BLDA: 78 MMHG (ref 80–100)
POTASSIUM BLOOD GAS (OHS): 3.6 MMOL/L (ref 3.5–5)
POTASSIUM BLOOD GAS (OHS): 3.8 MMOL/L (ref 3.5–5)
POTASSIUM SERPL-SCNC: 4.4 MMOL/L (ref 3.5–5.1)
PS (OHS): 10 CMH2O
SAMPLE SITE BLOOD GAS (OHS): ABNORMAL
SAMPLE SITE BLOOD GAS (OHS): ABNORMAL
SAO2 % BLDA: 88 %
SAO2 % BLDA: 96 %
SODIUM BLOOD GAS (OHS): 130 MMOL/L (ref 137–145)
SODIUM BLOOD GAS (OHS): 131 MMOL/L (ref 137–145)
SODIUM SERPL-SCNC: 133 MMOL/L (ref 136–145)

## 2023-07-11 PROCEDURE — 94799 UNLISTED PULMONARY SVC/PX: CPT

## 2023-07-11 PROCEDURE — 99900035 HC TECH TIME PER 15 MIN (STAT)

## 2023-07-11 PROCEDURE — 94003 VENT MGMT INPAT SUBQ DAY: CPT

## 2023-07-11 PROCEDURE — 25000242 PHARM REV CODE 250 ALT 637 W/ HCPCS: Performed by: INTERNAL MEDICINE

## 2023-07-11 PROCEDURE — 80069 RENAL FUNCTION PANEL: CPT | Performed by: INTERNAL MEDICINE

## 2023-07-11 PROCEDURE — 63600175 PHARM REV CODE 636 W HCPCS: Performed by: INTERNAL MEDICINE

## 2023-07-11 PROCEDURE — 63600175 PHARM REV CODE 636 W HCPCS: Performed by: STUDENT IN AN ORGANIZED HEALTH CARE EDUCATION/TRAINING PROGRAM

## 2023-07-11 PROCEDURE — 27000200 HC HIGH FLOW DEL DISP CIRCUIT

## 2023-07-11 PROCEDURE — 27000221 HC OXYGEN, UP TO 24 HOURS

## 2023-07-11 PROCEDURE — 94761 N-INVAS EAR/PLS OXIMETRY MLT: CPT

## 2023-07-11 PROCEDURE — 63600175 PHARM REV CODE 636 W HCPCS

## 2023-07-11 PROCEDURE — 25000003 PHARM REV CODE 250: Performed by: INTERNAL MEDICINE

## 2023-07-11 PROCEDURE — 80100014 HC HEMODIALYSIS 1:1

## 2023-07-11 PROCEDURE — 36600 WITHDRAWAL OF ARTERIAL BLOOD: CPT

## 2023-07-11 PROCEDURE — 20000000 HC ICU ROOM

## 2023-07-11 PROCEDURE — 94660 CPAP INITIATION&MGMT: CPT | Mod: XB

## 2023-07-11 PROCEDURE — 25000003 PHARM REV CODE 250: Performed by: STUDENT IN AN ORGANIZED HEALTH CARE EDUCATION/TRAINING PROGRAM

## 2023-07-11 PROCEDURE — 99900031 HC PATIENT EDUCATION (STAT)

## 2023-07-11 PROCEDURE — 94640 AIRWAY INHALATION TREATMENT: CPT

## 2023-07-11 PROCEDURE — 27100092 HC HIGH FLOW DELIVERY CANNULA

## 2023-07-11 PROCEDURE — 99900026 HC AIRWAY MAINTENANCE (STAT)

## 2023-07-11 PROCEDURE — 97605 NEG PRS WND THER DME<=50SQCM: CPT

## 2023-07-11 PROCEDURE — 27200966 HC CLOSED SUCTION SYSTEM

## 2023-07-11 PROCEDURE — 25000003 PHARM REV CODE 250

## 2023-07-11 PROCEDURE — 82803 BLOOD GASES ANY COMBINATION: CPT

## 2023-07-11 PROCEDURE — 27000190 HC CPAP FULL FACE MASK W/VALVE

## 2023-07-11 PROCEDURE — 27000249 HC VAPOTHERM CIRCUIT

## 2023-07-11 PROCEDURE — 27100171 HC OXYGEN HIGH FLOW UP TO 24 HOURS

## 2023-07-11 RX ORDER — ROCURONIUM BROMIDE 10 MG/ML
100 INJECTION, SOLUTION INTRAVENOUS ONCE
Status: COMPLETED | OUTPATIENT
Start: 2023-07-11 | End: 2023-07-11

## 2023-07-11 RX ORDER — LABETALOL HYDROCHLORIDE 5 MG/ML
10 INJECTION, SOLUTION INTRAVENOUS EVERY 4 HOURS PRN
Status: DISCONTINUED | OUTPATIENT
Start: 2023-07-11 | End: 2023-08-07

## 2023-07-11 RX ORDER — SODIUM CHLORIDE 9 MG/ML
INJECTION, SOLUTION INTRAVENOUS ONCE
Status: CANCELLED | OUTPATIENT
Start: 2023-07-11 | End: 2023-07-11

## 2023-07-11 RX ORDER — ETOMIDATE 2 MG/ML
0.3 INJECTION INTRAVENOUS ONCE
Status: COMPLETED | OUTPATIENT
Start: 2023-07-11 | End: 2023-07-11

## 2023-07-11 RX ORDER — HYDRALAZINE HYDROCHLORIDE 20 MG/ML
10 INJECTION INTRAMUSCULAR; INTRAVENOUS EVERY 8 HOURS PRN
Status: DISCONTINUED | OUTPATIENT
Start: 2023-07-11 | End: 2023-08-07

## 2023-07-11 RX ORDER — SODIUM CHLORIDE 9 MG/ML
INJECTION, SOLUTION INTRAVENOUS
Status: CANCELLED | OUTPATIENT
Start: 2023-07-11

## 2023-07-11 RX ADMIN — SODIUM CHLORIDE SOLN NEBU 3% 4 ML: 3 NEBU SOLN at 12:07

## 2023-07-11 RX ADMIN — ATORVASTATIN CALCIUM 20 MG: 10 TABLET, FILM COATED ORAL at 09:07

## 2023-07-11 RX ADMIN — FUROSEMIDE 40 MG: 10 INJECTION, SOLUTION INTRAMUSCULAR; INTRAVENOUS at 05:07

## 2023-07-11 RX ADMIN — DEXMEDETOMIDINE HYDROCHLORIDE 1 MCG/KG/HR: 400 INJECTION INTRAVENOUS at 04:07

## 2023-07-11 RX ADMIN — GUAIFENESIN 400 MG: 200 SOLUTION ORAL at 05:07

## 2023-07-11 RX ADMIN — PROPOFOL 25 MCG/KG/MIN: 10 INJECTION, EMULSION INTRAVENOUS at 02:07

## 2023-07-11 RX ADMIN — DEXMEDETOMIDINE HYDROCHLORIDE 1 MCG/KG/HR: 400 INJECTION INTRAVENOUS at 09:07

## 2023-07-11 RX ADMIN — SODIUM CHLORIDE SOLN NEBU 3% 4 ML: 3 NEBU SOLN at 07:07

## 2023-07-11 RX ADMIN — DOXYCYCLINE HYCLATE 100 MG: 100 TABLET, COATED ORAL at 09:07

## 2023-07-11 RX ADMIN — DEXMEDETOMIDINE HYDROCHLORIDE 1.4 MCG/KG/HR: 400 INJECTION INTRAVENOUS at 02:07

## 2023-07-11 RX ADMIN — ETOMIDATE 25.8 MG: 2 INJECTION INTRAVENOUS at 02:07

## 2023-07-11 RX ADMIN — OXYBUTYNIN CHLORIDE 5 MG: 5 TABLET ORAL at 09:07

## 2023-07-11 RX ADMIN — DEXMEDETOMIDINE HYDROCHLORIDE 1.4 MCG/KG/HR: 400 INJECTION INTRAVENOUS at 07:07

## 2023-07-11 RX ADMIN — FUROSEMIDE 40 MG: 10 INJECTION, SOLUTION INTRAMUSCULAR; INTRAVENOUS at 08:07

## 2023-07-11 RX ADMIN — DOXYCYCLINE HYCLATE 100 MG: 100 TABLET, COATED ORAL at 08:07

## 2023-07-11 RX ADMIN — MICONAZOLE NITRATE 2 % TOPICAL POWDER: at 08:07

## 2023-07-11 RX ADMIN — FENOFIBRATE 48 MG: 48 TABLET, FILM COATED ORAL at 08:07

## 2023-07-11 RX ADMIN — AMLODIPINE BESYLATE 5 MG: 5 TABLET ORAL at 08:07

## 2023-07-11 RX ADMIN — GUAIFENESIN 400 MG: 200 SOLUTION ORAL at 01:07

## 2023-07-11 RX ADMIN — ROCURONIUM BROMIDE 100 MG: 10 INJECTION INTRAVENOUS at 02:07

## 2023-07-11 RX ADMIN — CARVEDILOL 6.25 MG: 3.12 TABLET, FILM COATED ORAL at 09:07

## 2023-07-11 RX ADMIN — SENNOSIDES AND DOCUSATE SODIUM 2 TABLET: 50; 8.6 TABLET ORAL at 08:07

## 2023-07-11 RX ADMIN — MICONAZOLE NITRATE 2 % TOPICAL POWDER: at 09:07

## 2023-07-11 RX ADMIN — DEXMEDETOMIDINE HYDROCHLORIDE 1.4 MCG/KG/HR: 400 INJECTION INTRAVENOUS at 12:07

## 2023-07-11 RX ADMIN — GUAIFENESIN 400 MG: 200 SOLUTION ORAL at 09:07

## 2023-07-11 RX ADMIN — FAMOTIDINE 20 MG: 10 INJECTION, SOLUTION INTRAVENOUS at 08:07

## 2023-07-11 RX ADMIN — HYDRALAZINE HYDROCHLORIDE 10 MG: 20 INJECTION INTRAMUSCULAR; INTRAVENOUS at 10:07

## 2023-07-11 RX ADMIN — SODIUM BICARBONATE 650 MG TABLET 650 MG: at 09:07

## 2023-07-11 RX ADMIN — CEFTRIAXONE SODIUM 2 G: 2 INJECTION, POWDER, FOR SOLUTION INTRAMUSCULAR; INTRAVENOUS at 11:07

## 2023-07-11 RX ADMIN — ENOXAPARIN SODIUM 30 MG: 30 INJECTION SUBCUTANEOUS at 04:07

## 2023-07-11 RX ADMIN — PROPOFOL 25 MCG/KG/MIN: 10 INJECTION, EMULSION INTRAVENOUS at 04:07

## 2023-07-11 RX ADMIN — PROPOFOL 50 MCG/KG/MIN: 10 INJECTION, EMULSION INTRAVENOUS at 01:07

## 2023-07-11 RX ADMIN — SENNOSIDES AND DOCUSATE SODIUM 2 TABLET: 50; 8.6 TABLET ORAL at 09:07

## 2023-07-11 RX ADMIN — CARVEDILOL 6.25 MG: 3.12 TABLET, FILM COATED ORAL at 08:07

## 2023-07-11 RX ADMIN — SODIUM CHLORIDE SOLN NEBU 3% 4 ML: 3 NEBU SOLN at 04:07

## 2023-07-11 RX ADMIN — SODIUM BICARBONATE 650 MG TABLET 650 MG: at 08:07

## 2023-07-11 RX ADMIN — PROPOFOL 40 MCG/KG/MIN: 10 INJECTION, EMULSION INTRAVENOUS at 08:07

## 2023-07-11 RX ADMIN — GUAIFENESIN 400 MG: 200 SOLUTION ORAL at 03:07

## 2023-07-11 RX ADMIN — OXYBUTYNIN CHLORIDE 5 MG: 5 TABLET ORAL at 08:07

## 2023-07-11 RX ADMIN — GUAIFENESIN 400 MG: 200 SOLUTION ORAL at 10:07

## 2023-07-11 RX ADMIN — DEXMEDETOMIDINE HYDROCHLORIDE 1.4 MCG/KG/HR: 400 INJECTION INTRAVENOUS at 01:07

## 2023-07-11 NOTE — PLAN OF CARE
Problem: Adult Inpatient Plan of Care  Goal: Plan of Care Review  Outcome: Ongoing, Not Progressing  Goal: Patient-Specific Goal (Individualized)  Outcome: Ongoing, Not Progressing  Goal: Absence of Hospital-Acquired Illness or Injury  Outcome: Ongoing, Not Progressing  Goal: Optimal Comfort and Wellbeing  Outcome: Ongoing, Not Progressing  Goal: Readiness for Transition of Care  Outcome: Ongoing, Not Progressing     Problem: Diabetes Comorbidity  Goal: Blood Glucose Level Within Targeted Range  Outcome: Ongoing, Not Progressing     Problem: Skin Injury Risk Increased  Goal: Skin Health and Integrity  Outcome: Ongoing, Not Progressing     Problem: Impaired Wound Healing  Goal: Optimal Wound Healing  Outcome: Ongoing, Not Progressing     Problem: Pain Acute  Goal: Acceptable Pain Control and Functional Ability  Outcome: Ongoing, Not Progressing     Problem: Infection  Goal: Absence of Infection Signs and Symptoms  Outcome: Ongoing, Not Progressing     Problem: Communication Impairment (Mechanical Ventilation, Invasive)  Goal: Effective Communication  Outcome: Ongoing, Not Progressing     Problem: Device-Related Complication Risk (Mechanical Ventilation, Invasive)  Goal: Optimal Device Function  Outcome: Ongoing, Not Progressing     Problem: Inability to Wean (Mechanical Ventilation, Invasive)  Goal: Mechanical Ventilation Liberation  Outcome: Ongoing, Not Progressing     Problem: Nutrition Impairment (Mechanical Ventilation, Invasive)  Goal: Optimal Nutrition Delivery  Outcome: Ongoing, Not Progressing     Problem: Skin and Tissue Injury (Mechanical Ventilation, Invasive)  Goal: Absence of Device-Related Skin and Tissue Injury  Outcome: Ongoing, Not Progressing     Problem: Ventilator-Induced Lung Injury (Mechanical Ventilation, Invasive)  Goal: Absence of Ventilator-Induced Lung Injury  Outcome: Ongoing, Not Progressing     Problem: Communication Impairment (Artificial Airway)  Goal: Effective  Communication  Outcome: Ongoing, Not Progressing     Problem: Device-Related Complication Risk (Artificial Airway)  Goal: Optimal Device Function  Outcome: Ongoing, Not Progressing     Problem: Skin and Tissue Injury (Artificial Airway)  Goal: Absence of Device-Related Skin or Tissue Injury  Outcome: Ongoing, Not Progressing     Problem: Noninvasive Ventilation Acute  Goal: Effective Unassisted Ventilation and Oxygenation  Outcome: Ongoing, Not Progressing     Problem: Device-Related Complication Risk (Hemodialysis)  Goal: Safe, Effective Therapy Delivery  Outcome: Ongoing, Not Progressing     Problem: Hemodynamic Instability (Hemodialysis)  Goal: Effective Tissue Perfusion  Outcome: Ongoing, Not Progressing     Problem: Infection (Hemodialysis)  Goal: Absence of Infection Signs and Symptoms  Outcome: Ongoing, Not Progressing

## 2023-07-11 NOTE — PHYSICIAN QUERY
"PT Name: Bianca Khan  MR #: [id number]    DOCUMENTATION CLARIFICATION      CDS/: Sofiya Gardiner RN       Contact information: [email or web address]    This form is a permanent document in the medical record.    Query Date: July 11, 2023    By submitting this query, we are merely seeking further clarification of documentation. Please utilize your independent clinical judgment when addressing the question(s) below.    The medical record contains the following:   Indicators   Supporting Clinical Findings Location in Medical Record   x "Pulmonary Edema"  Pulmonary edema with pleural effusions/ Pneumonitis   7/4 Infectious disease PNPN   x Wheezing, Productive cough, SOB, GUZMAN, Use of accessory muscles  On 7/4/2023, a RRT was called d/t acute respiratory distress that was not improving despite being placed on vapotherm at 35 L/min with FiO2 100%. At the time of examination, patient's initial GCS was 10 but progressively reduced to a GCS of 6.     Still on mechanical ventilatory support requiring increase settings since yesterday afternoon at a PEEP of 10 and FiO2 60% with saturations in the 90s. ABG demonstrates mild respiratory acidosis with hypoxia.   On exam patient has markedly diminished breath sounds bilaterally no accessory muscle usage distress on mechanical ventilatory support no dyssynchrony seen.  Peak and plateau pressures less than 30 cm of water.   7/4 Critical care Medicine H&P            7/8 Pulmonary critical care PN   x Radiology Findings  Impression:   Bilateral lung opacities, mildly greater on the right. Both pulmonary edema and pneumonia possible.     CT abdomen (07/04/2023, my reading of images) the bilateral posterior dependent pleural effusions with compensatory atelectasis of the bases bilateral.  The basilar lung fields are noted with patchy ground-glass infiltrates with some regions of consolidation.  There is no evidence of pneumoperitoneum.  Hepatomegaly noted.  " Nonspecific bowel gas pattern with no obvious evidence of obstruction.      Impression:  Progressive interstitial opacities may be related to interstitial edema, pneumonia or ARDS.   7/3 Chest Xray          7/ 4 CT Abdomen                  7/6 Chest Xray   x CHF documented/Respiratory Failure documented  Transferred to ICU due to acute hypoxic respiratory failure and intubated  7/4 Infectious disease PN    x Respiratory condition  Acute hypoxic respiratory failure  Pulmonary edema with pleural effusions/ Pneumonitis   7/4 Infectious disease PN   x RR                  PaO2                  PaCO2                     O2 sat  RR  - 41  PaO2   - 66.0                  PaCO2 - 49.0      O2 sat - 89  7/8 labs & VS   x Treatment:  Furosemide 40mg  Hemodialysis  7/8 Pulmonary critical care PN    x Supplemental O2:  Intubated on 7/4 7/4 Critical care medicine PN    Oxygen dependence      x Other:  There are extensive bilateral ground-glass/consolidative infiltrates throughout all lung fields.  Costophrenic angles appear sharp.  Infiltrates appear worsened in comparison to prior chest x-ray 07/03/2023 with bilateral pulmonary infiltrates right greater than left at that time.    Bianca Khan is a 58 y.o. male with PMH of paraplegia 2/2 spinal cord injury (T1-T6), neurogenic bladder with suprapubic catheter, chronic right ankle osteomyelitis, DM II, HTN, who presented to the ED on 6/28/2023 with CC of right knee pain. Per chart review, CT of right knee revealed 5 cm area of subcutaneous fluid in prepatellar region which was aspirated in the ED; he was taken to the OR on 6/29/2023 by orthopedic surgery team and was found to have prepatellar bursa infection and septic arthritis. Wound culture 6/29/2023 positive for strep agalactiae.       7/4 Critical care medicine H&P       Provider, please specify diagnosis or diagnoses associated with above clinical findings.    [    ] Acute pulmonary edema, (please specify causative  condition): ___________________   [  x  ] Acute pulmonary edema, unspecified cause   [    ] Other respiratory diagnosis (please specify): _________________________________         Please document in your progress notes daily for the duration of treatment, until resolved, and include in your discharge summary.

## 2023-07-11 NOTE — PROGRESS NOTES
Infectious Diseases Progress Note  58-year-old male with past medical history of T-spine cord injury with paraplegia, diabetes, HTN, hepatitis-C, chronic right ankle wound/osteomyelitis, was on suppressive doxycycline, known to my service, seen by us initially at [facility] and has followed with us at the office for chronic osteomyelitis, is admitted to [facility] on 06/28/2023 presenting through the ED with complaints of pain and swelling to his right knee, apparently struck his knee.  He did also report prior remote right knee surgery.  He was evaluated and noted to have no fevers initially but a temperature of 100.2° today 6/29, no leukocytosis but with thrombocytosis of up to 531 today.  .2 and ESR >130.  He is anemic with low albumin.  Blood cultures have been negative.  CT scan of the right knee noted a 5 cm area of subcutaneous fluid collection in the prepatellar region.  There was aspiration in the ER with findings of purulent fluid and cultures showing group B Streptococcus.  He was seen by the orthopedic surgery team with findings of right prepatellar bursa abscess and is status post incision and drainage of right knee septic arthritis and right prepatellar bursa abscess today 6/29 with cultures pending.      Subjective:  Remains in the ICU, on ventilator.  No new complaints, low-grade fevers, doing about the same.  In no acute distress      Past Medical History:   Diagnosis Date    Arthritis     Chronic ulcer of ankle 05/26/2022    Frequent UTI 07/02/2019    Generalized anxiety disorder 05/26/2022    Neurogenic bladder 05/26/2022    Osteomyelitis 05/26/2022    Presence of suprapubic catheter 05/26/2022    Pure hypercholesterolemia 05/26/2022    Retention of urine, unspecified 08/09/2019    Spinal cord injury at T1-T6 level 04/20/2018     Past Surgical History:   Procedure Laterality Date    INCISION AND DRAINAGE, LOWER EXTREMITY Right 6/29/2023    Procedure:  INCISION AND DRAINAGE, LOWER EXTREMITY;  Surgeon: Prabhu Shen DO;  Location: [facility] OR;  Service: Orthopedics;  Laterality: Right;  supine bone foam wash stuff cultures    INSERTION OF INTRAMEDULLARY MARISA Right 8/10/2022    Procedure: INSERTION, INTRAMEDULLARY MARISA RIGHT TIBIA;  Surgeon: Jorge Orellana MD;  Location: [facility] OR;  Service: Orthopedics;  Laterality: Right;     Social History     Socioeconomic History    Marital status:    Tobacco Use    Smoking status: Every Day     Types: Cigars, Cigarettes    Smokeless tobacco: Never   Substance and Sexual Activity    Alcohol use: Yes     Alcohol/week: 2.0 standard drinks     Types: 2 Cans of beer per week    Drug use: Not Currently    Sexual activity: Never       Review of Systems   Unable to perform ROS: Intubated       Review of patient's allergies indicates:   Allergen Reactions    Baclofen Itching and Anxiety         Scheduled Meds:   amLODIPine  5 mg Oral Daily    atorvastatin  20 mg Oral Nightly    carvediloL  6.25 mg Oral BID    doxycycline  100 mg Oral Q12H    enoxaparin  30 mg Subcutaneous Daily    epoetin franklin  20,000 Units Subcutaneous Every Mon, Wed, Fri    famotidine (PF)  20 mg Intravenous Daily    fenofibrate  48 mg Oral Daily    furosemide (LASIX) injection  40 mg Intravenous BID    guaiFENesin 100 mg/5 ml  400 mg Per NG tube Q4H    miconazole NITRATE 2 %   Topical (Top) BID    oxybutynin  5 mg Oral BID    senna-docusate 8.6-50 mg  2 tablet Oral BID    sodium bicarbonate  650 mg Oral BID    sodium chloride 3%  4 mL Nebulization Q4H    sodium citrate-citric acid 500-334 mg/5 ml  30 mL Oral Once     Continuous Infusions:   dexmedeTOMIDine (Precedex) infusion (titrating) 1.4 mcg/kg/hr (07/10/23 2035)    NORepinephrine bitartrate-D5W 0.02 mcg/kg/min (07/04/23 1315)    propofoL 30 mcg/kg/min (07/10/23 2035)     PRN Meds:sodium chloride, acetaminophen, hydrALAZINE, labetalol, sodium chloride 0.9%, sodium chloride 0.9%    Objective:  BP (!) 140/80    "Pulse 66   Temp 99.5 °F (37.5 °C)   Resp (!) 22   Ht 5' 9.69" (1.77 m)   Wt 86.2 kg (190 lb)   SpO2 95%   BMI 27.51 kg/m²     Physical Exam:   Physical Exam  Vitals reviewed.   Constitutional:       General: He is not in acute distress.     Appearance: He is ill-appearing.   HENT:      Head: Normocephalic and atraumatic.      Mouth/Throat:      Comments: ET tube in place  Cardiovascular:      Rate and Rhythm: Normal rate and regular rhythm.      Heart sounds: Normal heart sounds.   Pulmonary:      Effort: No respiratory distress.      Comments: Coarse breath sounds on ventilator  Abdominal:      General: Bowel sounds are normal. There is no distension.      Palpations: Abdomen is soft.      Tenderness: There is no abdominal tenderness.   Genitourinary:     Comments: Suprapubic catheter  Musculoskeletal:      Cervical back: Neck supple.      Comments: Some contracture of lower extremities   Skin:     Findings: No rash.      Comments: Right knee with wound VAC. Right ankle and left heel wounds dressed   Neurological:      Comments: Unable to fully evaluate, sedated on ventilator   Psychiatric:      Comments: Not communicative      Imaging  Imaging Results              CT Knee W W/O Contrast Right (Final result)  Result time 06/28/23 18:37:42      Final result by Gustavo Cassidy MD (06/28/23 18:37:42)                   Impression:      Mildly limited assessment.  5 cm area of subcutaneous fluid in the prepatellar region may have thin peripheral enhancement.  Fluid collection is possible.    Subcutaneous edema in the calf.      Electronically signed by: Gustavo Cassidy  Date:    06/28/2023  Time:    18:37               Narrative:    EXAMINATION:  CT KNEE W W/O CONTRAST RIGHT    CLINICAL HISTORY:  possible infection;    TECHNIQUE:  CT imaging of the right knee before and after IV contrast.  Axial, coronal and sagittal reformatted images reviewed.   Dose length product is 585 mGycm. Automatic exposure control, " adjustment of mA/kV or iterative reconstruction technique used to limit radiation dose.    COMPARISON:  Radiograph 06/28/2023    FINDINGS:  Assessment mildly limited by motion.  Joint alignments are maintained with degenerative changes at the knee.  Fixation hardware in the lower femur and tibia with remote proximal fibular fracture.  Areas of heterotopic ossification along the lower portion of the femur.  Small knee effusion.  Areas of subcutaneous edema anteriorly below the knee.  More focal area of fluid in the subcutaneous tissues of the prepatellar region measures up to 5 cm in transverse diameter and 5 cm in length.  There is image noise from the hardware, but this fluid may have thin areas of peripheral enhancement.  No tracking subcutaneous gas.                                       X-Ray Tibia Fibula 2 View Right (Final result)  Result time 06/28/23 18:14:06      Final result by Tracy Zamudio MD (06/28/23 18:14:06)                   Impression:      Posttraumatic and postsurgical changes at the femur and lower leg.  There is chronic deformity at the distal femur with heterogeneous sclerosis and lucency superimposed on background bony demineralization.  No old imaging of this region available for comparison.  This makes it difficult to evaluate for acute superimposed lytic change.    Postsurgical and posttraumatic change at the tibia and fibula with bony demineralization.  No appreciable acute osseous abnormality.      Electronically signed by: Tracy Zamudio  Date:    06/28/2023  Time:    18:14               Narrative:    EXAMINATION:  XR FEMUR 2 VIEW RIGHT; XR TIBIA FIBULA 2 VIEW RIGHT    CLINICAL HISTORY:  possible infection;; infection;    TECHNIQUE:  AP and lateral views of the right femur were performed.    AP and lateral views of the right tibia and fibula were obtained.    COMPARISON:  Knee radiographs 06/28/2023, tibia and fibular radiographs 08/10/2022    FINDINGS:  There are postsurgical  changes of ORIF at the distal femur with lateral plate and screws.  There are postsurgical changes of ORIF at the tibia with antegrade intramedullary raven and proximal and distal interlocking screws.  Hardware appears intact.  No acute fracture seen.  There are old, healed fracture deformities.    There is chronic remodeling at the distal femur with heterogeneous appearance of the marrow and cortex distally.  There are areas of lucency as well as sclerosis.  There is no imaging through the distal femur available for comparison to evaluate for acute lytic changes superimposed on chronic background posttraumatic and postsurgical change.  Older prior radiographs of the tibia and fibula do not adequately imaged the area.  The bones are demineralized.    There is soft tissue swelling at the knee.  There is soft tissue swelling at the ankle.                                       X-Ray Femur 2 View Right (Final result)  Result time 06/28/23 18:14:06      Final result by Tracy Zamudio MD (06/28/23 18:14:06)                   Impression:      Posttraumatic and postsurgical changes at the femur and lower leg.  There is chronic deformity at the distal femur with heterogeneous sclerosis and lucency superimposed on background bony demineralization.  No old imaging of this region available for comparison.  This makes it difficult to evaluate for acute superimposed lytic change.    Postsurgical and posttraumatic change at the tibia and fibula with bony demineralization.  No appreciable acute osseous abnormality.      Electronically signed by: Tracy Zamudio  Date:    06/28/2023  Time:    18:14               Narrative:    EXAMINATION:  XR FEMUR 2 VIEW RIGHT; XR TIBIA FIBULA 2 VIEW RIGHT    CLINICAL HISTORY:  possible infection;; infection;    TECHNIQUE:  AP and lateral views of the right femur were performed.    AP and lateral views of the right tibia and fibula were obtained.    COMPARISON:  Knee radiographs 06/28/2023,  tibia and fibular radiographs 08/10/2022    FINDINGS:  There are postsurgical changes of ORIF at the distal femur with lateral plate and screws.  There are postsurgical changes of ORIF at the tibia with antegrade intramedullary raven and proximal and distal interlocking screws.  Hardware appears intact.  No acute fracture seen.  There are old, healed fracture deformities.    There is chronic remodeling at the distal femur with heterogeneous appearance of the marrow and cortex distally.  There are areas of lucency as well as sclerosis.  There is no imaging through the distal femur available for comparison to evaluate for acute lytic changes superimposed on chronic background posttraumatic and postsurgical change.  Older prior radiographs of the tibia and fibula do not adequately imaged the area.  The bones are demineralized.    There is soft tissue swelling at the knee.  There is soft tissue swelling at the ankle.                                       X-Ray Knee 3 View Right (Final result)  Result time 06/28/23 14:18:03      Final result by Lanette Waller MD (06/28/23 14:18:03)                   Impression:      1. No acute bony abnormality.  2. Soft tissue swelling around the knee.      Electronically signed by: Lanette Waller  Date:    06/28/2023  Time:    14:18               Narrative:    EXAMINATION:  XR KNEE 3 VIEW RIGHT    CLINICAL HISTORY:  Effusion, right knee    COMPARISON:  None.    FINDINGS:  There has been prior internal fixation of the femur and tibia.  There are chronic changes of the bones with heterotopic ossification around the knee.  There is no acute fracture identified.  There is soft tissue swelling around the knee.                                       Lab Review   Recent Results (from the past 24 hour(s))   Comprehensive Metabolic Panel    Collection Time: 07/10/23 12:37 AM   Result Value Ref Range    Sodium Level 132 (L) 136 - 145 mmol/L    Potassium Level 5.2 (H) 3.5 - 5.1 mmol/L     Chloride 95 (L) 98 - 107 mmol/L    Carbon Dioxide 20 (L) 22 - 29 mmol/L    Glucose Level 147 (H) 74 - 100 mg/dL    Blood Urea Nitrogen 92.0 (H) 8.4 - 25.7 mg/dL    Creatinine 5.90 (H) 0.73 - 1.18 mg/dL    Calcium Level Total 8.1 (L) 8.4 - 10.2 mg/dL    Protein Total 6.2 (L) 6.4 - 8.3 gm/dL    Albumin Level 1.9 (L) 3.5 - 5.0 g/dL    Globulin 4.3 (H) 2.4 - 3.5 gm/dL    Albumin/Globulin Ratio 0.4 (L) 1.1 - 2.0 ratio    Bilirubin Total 0.4 <=1.5 mg/dL    Alkaline Phosphatase 115 40 - 150 unit/L    Alanine Aminotransferase 8 0 - 55 unit/L    Aspartate Aminotransferase 23 5 - 34 unit/L    eGFR 10 mls/min/1.73/m2   CBC with Differential    Collection Time: 07/10/23 12:37 AM   Result Value Ref Range    WBC 11.71 (H) 4.50 - 11.50 x10(3)/mcL    RBC 3.99 (L) 4.70 - 6.10 x10(6)/mcL    Hgb 10.0 (L) 14.0 - 18.0 g/dL    Hct 29.9 (L) 42.0 - 52.0 %    MCV 74.9 (L) 80.0 - 94.0 fL    MCH 25.1 (L) 27.0 - 31.0 pg    MCHC 33.4 33.0 - 36.0 g/dL    RDW 18.8 (H) 11.5 - 17.0 %    Platelet 492 (H) 130 - 400 x10(3)/mcL    MPV 10.1 7.4 - 10.4 fL    Neut % 67.5 %    Lymph % 18.6 %    Mono % 11.9 %    Eos % 0.3 %    Basophil % 0.5 %    Lymph # 2.18 0.6 - 4.6 x10(3)/mcL    Neut # 7.91 2.1 - 9.2 x10(3)/mcL    Mono # 1.39 (H) 0.1 - 1.3 x10(3)/mcL    Eos # 0.03 0 - 0.9 x10(3)/mcL    Baso # 0.06 <=0.2 x10(3)/mcL    IG# 0.14 (H) 0 - 0.04 x10(3)/mcL    IG% 1.2 %    NRBC% 0.0 %   Blood Gas (ABG)    Collection Time: 07/10/23  6:42 AM   Result Value Ref Range    Sample Type Arterial Blood     Sample site Left Radial Artery     Drawn by sd rrt     pH, Blood gas 7.360 7.350 - 7.450    pCO2, Blood gas 46.0 (H) 35.0 - 45.0 mmHg    pO2, Blood gas 76.0 (L) 80.0 - 100.0 mmHg    Sodium, Blood Gas 128 (L) 137 - 145 mmol/L    Potassium, Blood Gas 4.8 3.5 - 5.0 mmol/L    Calcium Level Ionized 1.08 (L) 1.12 - 1.23 mmol/L    TOC2, Blood gas 27.4 mmol/L    Base Excess, Blood gas 0.30 -2.00 - 2.00 mmol/L    sO2, Blood gas 94.5 %    HCO3, Blood gas 26.0 22.0 - 26.0 mmol/L     THb, Blood gas 9.2 (L) 12 - 16 g/dL    O2 Hb, Blood Gas 93.9 (L) 94.0 - 97.0 %    CO Hgb 1.4 0.5 - 1.5 %    Met Hgb 0.3 (L) 0.4 - 1.5 %    Allens Test Yes     MODE AC     Oxygen Device, Blood gas Ventilator     FIO2, Blood gas 50 %    Mech Vt 450 ml    Mech RR 16 b/min    PEEP 8.0 cmH2O   Comprehensive Metabolic Panel    Collection Time: 07/10/23  4:16 PM   Result Value Ref Range    Sodium Level 135 (L) 136 - 145 mmol/L    Potassium Level 4.0 3.5 - 5.1 mmol/L    Chloride 97 (L) 98 - 107 mmol/L    Carbon Dioxide 24 22 - 29 mmol/L    Glucose Level 133 (H) 74 - 100 mg/dL    Blood Urea Nitrogen 52.8 (H) 8.4 - 25.7 mg/dL    Creatinine 3.82 (H) 0.73 - 1.18 mg/dL    Calcium Level Total 8.2 (L) 8.4 - 10.2 mg/dL    Protein Total 6.7 6.4 - 8.3 gm/dL    Albumin Level 2.1 (L) 3.5 - 5.0 g/dL    Globulin 4.6 (H) 2.4 - 3.5 gm/dL    Albumin/Globulin Ratio 0.5 (L) 1.1 - 2.0 ratio    Bilirubin Total 0.4 <=1.5 mg/dL    Alkaline Phosphatase 110 40 - 150 unit/L    Alanine Aminotransferase 11 0 - 55 unit/L    Aspartate Aminotransferase 31 5 - 34 unit/L    eGFR 17 mls/min/1.73/m2   Magnesium    Collection Time: 07/10/23  4:16 PM   Result Value Ref Range    Magnesium Level 2.10 1.60 - 2.60 mg/dL   Phosphorus    Collection Time: 07/10/23  4:16 PM   Result Value Ref Range    Phosphorus Level 5.8 (H) 2.3 - 4.7 mg/dL   RT Blood Gas    Collection Time: 07/10/23  4:23 PM   Result Value Ref Range    Sample Type Arterial Blood     Sample site Left Radial Artery     Drawn by ksfb rrt     pH, Blood gas 7.410 7.350 - 7.450    pCO2, Blood gas 50.0 (H) 35.0 - 45.0 mmHg    pO2, Blood gas 105.0 (H) 80.0 - 100.0 mmHg    Sodium, Blood Gas 130 (L) 137 - 145 mmol/L    Potassium, Blood Gas 3.5 3.5 - 5.0 mmol/L    Calcium Level Ionized 1.09 (L) 1.12 - 1.23 mmol/L    TOC2, Blood gas 33.2 mmol/L    Base Excess, Blood gas 5.90 mmol/L    sO2, Blood gas 98.0 %    HCO3, Blood gas 31.7 >=15.0 mmol/L    Allens Test Yes     MODE AC     Oxygen Device, Blood gas  Ventilator     FIO2, Blood gas 60.0 %    Mech Vt 450 ml    Mech RR 20 b/min    PEEP 8.0 cmH2O    Flow 50 LPM   POCT glucose    Collection Time: 07/10/23  7:07 PM   Result Value Ref Range    POCT Glucose 131 (H) 70 - 110 mg/dL             Assessment/Plan:  1. SIRS with fevers  2. Group B Streptococcus Right knee prepatellar abscess  3. Group B Streptococcus Right knee septic arthritis  4.  Anemia/reactive thrombocytosis  5.  Paraplegia secondary to T-spine cord injury  6. History of hepatitis-C   7. Chronic right ankle wound/osteomyelitis  8.  Diabetes    9.  Acute kidney injury  10. Acute hypoxic respiratory failure  11. Pulmonary edema with pleural effusions/ Pneumonitis      -Continue Merrem #7 and maintain chronic suppressive doxycycline    -Low grade fevers noted, leukocytosis and thrombocytosis trending down, follow  -7/4 blood cultures remain negative and sputum culture with moderate yeast.  7/8 repeat blood cultures negative so far, follow  -7/8 CXR is unchanged with persistent patchy airspace opacity with bibasilar effusions  -7/4 CT scan of the abdomen and pelvis and 7/3 chest x-ray results noted, likely dealing with pulmonary edema with pleural effusions and possibly superimposed infectious pneumonitis. We will get procalcitonin level for further evaluation  -6/28 right knee abscess culture with Group B Streptococcus  -Seen by Orthopedic surgery team calm s/p I&D of R prepatellar bursa abscess and septic R knee with cultures yielding Group G Streptococcus  -6/28 blood cultures negative  -Multiple comorbidities, paraplegic with chronic pressure wounds, right ankle osteomyelitis with exposed bone on chronic suppressive doxycycline  -Plan for a long-term antibiotic coverage, about a 6 week course for GBS septic arthritis, following inflammatory markers as well as maintaining on chronic suppressive antibiotics with doxycycline  -We will continue surgical site care per Orthopedic surgery team  -We will continue  wound care  -He is to continue management of his hepatitis-C as outpatient  -Renal impairment noted, HD per Nephrology, started 7/4  -7/3 Renal ultrasound results noted  -Continue ventilator management and ICU support per intensivist  -Discussed with nursing staff.

## 2023-07-11 NOTE — PROGRESS NOTES
07/11/23 1146   Cognitive   Level of Consciousness (AVPU) alert   Respiratory   Cough Frequency with stimulation   Cough Type productive   Suction   Suction Method oral   Respiratory Secretions Assessment   Secretions Amount scant   Secretions Color white   Secretions Characteristics thin   Breath Sounds   All Lung Fields Breath Sounds coarse   Oxygen Therapy   Device (Oxygen Therapy) BIPAP   Oxygen Concentration (%) 100        Incision/Site 06/29/23 1103 Right Leg   Date First Assessed/Time First Assessed: 06/29/23 1103   Side: Right  Location: Leg   Wound Image    Dressing Appearance Intact;Moist drainage   Drainage Amount Small   Drainage Characteristics/Odor No odor;Serosanguineous   Appearance Moist;Bleeding   Red (%), Wound Tissue Color 100 %   Periwound Area Dry;Macerated   Wound Edges Irregular   Wound Length (cm) 2 cm   Wound Width (cm) 1.6 cm   Wound Depth (cm) 1 cm   Wound Volume (cm^3) 3.2 cm^3   Wound Surface Area (cm^2) 3.2 cm^2   Tunneling (depth (cm)/location) 12 NOON 6cm   Care Cleansed with:;Sterile normal saline   Dressing Changed        Negative Pressure Wound Therapy  06/29/23 1120 Right anterior   Placement Date/Time: 06/29/23 1120   Side: Right  Orientation: anterior  Location: Leg   NPWT Type Vacuum Therapy   Therapy Setting NPWT Continuous therapy   Pressure Setting NPWT 125 mmHg   Sponges Inserted NPWT Black;White  (white sponge to keep opening open)   Sponges Removed NPWT Black;White;1        Altered Skin Integrity 06/28/23 2230 Right lateral Ankle #6   Date First Assessed/Time First Assessed: 06/28/23 2230   Altered Skin Integrity Present on Admission - Did Patient arrive to the hospital with altered skin?: yes  Side: Right  Orientation: lateral  Location: Ankle  Wound Number: #6  Is this injury dev...   Wound Image    Description of Altered Skin Integrity Partial thickness tissue loss. Shallow open ulcer with a red or pink wound bed, without slough. Intact or Open/Ruptured  Serum-filled blister.   Dressing Appearance Intact;Moist drainage   Drainage Amount Small   Drainage Characteristics/Odor Serosanguineous;No odor   Appearance Pink;Red;Moist   Tissue loss description Partial thickness   Red (%), Wound Tissue Color 100 %   Periwound Area Dry;Intact   Wound Edges Irregular   Wound Length (cm) 3.5 cm   Wound Width (cm) 3 cm   Wound Depth (cm) 0.4 cm   Wound Volume (cm^3) 4.2 cm^3   Wound Surface Area (cm^2) 10.5 cm^2   Care Cleansed with:;Sterile normal saline   Dressing Calcium alginate;Gauze;Rolled gauze     WOCN follow-up changed out pt right knee wd vac sponges keeping with the white sponge to keep hole opened.  Changed out dressing to right lateral ankle as well.   No changes to care at this time.  Pt remains in ICU on a total care bed-being turned q2hrs per protocol and offloading boots in place.   Next vac change Thursday.

## 2023-07-11 NOTE — PROCEDURES
"Bianca Khan is a 58 y.o. male patient.    Temp: 98.9 °F (37.2 °C) (07/11/23 0845)  Pulse: 71 (07/11/23 1034)  Resp: (!) 23 (07/11/23 1034)  BP: (!) 159/83 (07/11/23 1034)  SpO2: 95 % (07/11/23 1034)  Weight: 86.2 kg (190 lb) (07/04/23 1114)  Height: 5' 9.69" (177 cm) (07/05/23 1249)       Intubation    Date/Time: 7/11/2023 2:36 PM  Location procedure was performed: [facility]  Performed by: Luis Mcclure MD  Authorized by: Luis Mcclure MD   Pre-operative diagnosis: Acute hypoxemic respiratory failure  Post-operative diagnosis: Same  Consent Done: Emergent Situation  Indications: respiratory distress, respiratory failure, airway protection, hypoxemia and pulmonary toilet  Intubation method: direct  Patient status: paralyzed (RSI)  Preoxygenation: bag valve mask  Sedatives: etomidate (13 mL)  Paralytic: rocuronium (100 mg)  Laryngoscope size: Mac 3 (1st attempt with Rice 2 blade, 2nd attempt with MAC 3 blade)  Tube size: 8.0 mm  Tube type: cuffed  Number of attempts: 2  Ventilation between attempts: BVM  Cricoid pressure: no  Cords visualized: yes (Grade 3 view of the cords)  Post-procedure assessment: chest rise and CO2 detector (Direct visualization, tube fog)  Breath sounds: equal (Rhonchi auscultated bilaterally)  Cuff inflated: yes  ETT to lip: 26 cm  ETT to teeth: 25 cm  Tube secured with: ETT herring  Chest x-ray interpreted by: Chest x-ray pending.  Chest x-ray findings: Chest x-ray pending.  Patient tolerance: Patient tolerated the procedure well with no immediate complications  Complications: No  Specimens: No  Implants: No        7/11/2023    "

## 2023-07-11 NOTE — PROGRESS NOTES
Progress Note  Nephrology    Admit Date: 6/28/2023   LOS: 13 days     SUBJECTIVE:     Follow-up For:  ANTON /ATN    Interval History:  57 Y/O male with history of Paraplegia with neurogenic bladder and suprapubic cath with frequent infection and infection of Right knee admitted for above   Pt had oliguria with increasing BUN and CR  Creatininwas up to 8/12  Started on HD   Still on vent and intubated  cheast x ray today showed pulmonary congestion     Review of Systems:  Still intubated extubation failed today  Constitutional: No fever or chills  Respiratory: No cough or shortness of breath  Cardiovascular: No chest pain or palpitations  Gastrointestinal: No nausea or vomiting  Neurological: No confusion or weakness    OBJECTIVE:     Vital Signs Range (Last 24H):  Vitals:    07/11/23 1515   BP:    Pulse: 71   Resp: 20   Temp:        Temp:  [98 °F (36.7 °C)-99.5 °F (37.5 °C)]   Pulse:  [60-98]   Resp:  [18-41]   BP: (139-188)/()   SpO2:  [80 %-100 %]     I & O (Last 24H):  Intake/Output Summary (Last 24 hours) at 7/11/2023 1644  Last data filed at 7/11/2023 1400  Gross per 24 hour   Intake 3128.16 ml   Output 1005 ml   Net 2123.16 ml       Physical Exam:  Intubated and on vent   Head   normal   Neck   supple   Chest   symmetric   Lungs   clear   Heart   RRR  Abdomen   soft , non tender   Ext   no edema       Laboratory Data:    Recent Labs   Lab 07/11/23  0125   *   K 4.4   CO2 22   BUN 75.8*   CREATININE 4.30*   GLUCOSE 121*   CALCIUM 8.1*   PHOS 6.5*     Lab Results   Component Value Date    .3 (H) 07/04/2023    CALCIUM 8.1 (L) 07/11/2023    CAION 1.13 07/11/2023    PHOS 6.5 (H) 07/11/2023     Lab Results   Component Value Date    IRON 35 (L) 12/30/2021    TIBC 284 12/30/2021    FERRITIN 24.99 12/30/2021       Medications:  Medication list was reviewed and changes noted under Assessment/Plan.    Diagnostic Results:    Reviewed most recent CT/US/Echo/MRI    ASSESSMENT/PLAN:   1   ANTON / ATN  2   CKD    stage unspecified  3   SHPT  4   paraplegia  5   neurogenic bladder   6   Anemia  7   HTN  8   DM 2  9   A Fib  10  Right knee infection   11  respiratory failure on vent       Plan   will do dialysis today for pulmonary congestion and dialysis again in AM

## 2023-07-11 NOTE — PROGRESS NOTES
Inpatient Nutrition Assessment    Admit Date: 6/28/2023   Total duration of encounter: 13 days     Nutrition Recommendation/Prescription     Tube feeding recommendation:   Novasource Renal goal rate 35 ml/hr + 4 packets ProSource NoCarb daily to provide  1640 kcal/d (96% est needs)  123 g protein/d (95% est needs)  504 ml free water/d  (calculations based on estimated 20 hr/d run time)     Also add Malachi (provides 90 kcal, 2.5 g protein per serving) BID.    Communication of Recommendations: reviewed with nurse    Nutrition Assessment     Malnutrition Assessment/Nutrition-Focused Physical Exam    Malnutrition Context: chronic illness  Malnutrition Level:  (does not meet criteria)  Energy Intake (Malnutrition):  (unable to obtain)  Weight Loss (Malnutrition):  (unable to obtain)  Subcutaneous Fat (Malnutrition):  (does not meet criteria)           Muscle Mass (Malnutrition):  (does not meet criteria)                          Fluid Accumulation (Malnutrition):  (does not meet criteria)        A minimum of two characteristics is recommended for diagnosis of either severe or non-severe malnutrition.    Chart Review    Reason Seen: continuous nutrition monitoring and follow-up    Malnutrition Screening Tool Results   Have you recently lost weight without trying?: Unsure  Have you been eating poorly because of a decreased appetite?: Yes   MST Score: 3     Diagnosis:  Bilateral Pulmonary Infiltrates   Uremic encephalopathy 2/2 acute renal failure  Acute renal failure  Hyponatremia  Septic arthritis    Relevant Medical History: paraplegia 2/2 spinal cord injury (T1-T6), neurogenic bladder with suprapubic catheter, chronic right ankle osteomyelitis, DM II, HTN    Nutrition-Related Medications:   furosemide, senna-docusate    Calorie Containing IV Medications: no significant kcals from medications at this time    Nutrition-Related Labs:  7/5 Na 128, K 5.3, Glu 93, BUN 39.8, Crea 7.07, Phos 7.1, GFR 8  7/11 Na 133, BUN 75.8,  "Crea 4.3, Glu 121, Phos 6.5    Diet/PN Order: No diet orders on file  Oral Supplement Order: none  Tube Feeding Order:  Novasource Renal @ 35ml/hr (see below for calculation)  Appetite/Oral Intake: not applicable/not applicable  Factors Affecting Nutritional Intake: on mechanical ventilation  Food/Restorationism/Cultural Preferences: unable to obtain  Food Allergies: none reported    Skin Integrity: drain/device(s), wound  Wound(s):      Altered Skin Integrity 06/28/23 2230 medial Sacral spine #1-Tissue loss description: Partial thickness       Altered Skin Integrity 06/28/23 2230 Left posterior Buttocks #4-Tissue loss description: Partial thickness       Altered Skin Integrity 06/28/23 2230 Right lateral Ankle #6-Tissue loss description: Full thickness     Comments    7/5/23: Discussed with RN. Will provide tube feeding recommendations for when appropriate to start tube feeding. Receiving kcal from meds. Noted K and Phos, will need renal formula at this time. Will add supplemental protein and Malachi for wound healing.     7/11/23: TF continues, tolerated @ goal rate.     Anthropometrics    Height: 5' 9.69" (177 cm) Height Method: Stated  Last Weight: 86.2 kg (190 lb) (07/04/23 1114) Weight Method: Standard Scale (stated)  BMI (Calculated): 27.5  BMI Classification: overweight (BMI 25-29.9)        Ideal Body Weight (IBW), Male: 164.14 lb     % Ideal Body Weight, Male (lb): 115.75 %                          Usual Weight Provided By: unable to obtain usual weight    Wt Readings from Last 5 Encounters:   07/04/23 86.2 kg (190 lb)   09/13/22 86.2 kg (190 lb)   08/25/22 86.2 kg (190 lb 0.6 oz)   01/04/22 86.2 kg (189 lb 15.9 oz)     Weight Change(s) Since Admission:  Admit Weight: 86.2 kg (190 lb) (06/28/23 1346)    Estimated Needs    Weight Used For Calorie Calculations: 86.2 kg (190 lb 0.6 oz)  Energy Calorie Requirements (kcal): 1714kcal  Energy Need Method: [facility]  Weight Used For Protein Calculations: 86.2 kg (190 lb " 0.6 oz)  Protein Requirements: 130gm (1.5g/kg)  Fluid Requirements (mL): 1000ml + urinary output  Temp (24hrs), Av.1 °F (37.3 °C), Min:98 °F (36.7 °C), Max:100.1 °F (37.8 °C)    Vtot (L/Min) for [place] Equation Calculation: 7.1    Enteral Nutrition    Formula: Novasource Renal  Rate/Volume: 35ml/hr  Water Flushes: 50ml q4hr  Additives/Modulars: Prosource No Carb and Malachi  Route: orogastric tube  Method: continuous  Total Nutrition Provided by Tube Feeding, Additives, and Flushes:  Calories Provided  1640 kcal/d, 96% needs   Protein Provided  123 g/d, 95% needs   Fluid Provided  504 ml/d, N/A% needs   Continuous feeding calculations based on estimated 20 hr/d run time unless otherwise stated.    Parenteral Nutrition    Patient not receiving parenteral nutrition support at this time.    Evaluation of Received Nutrient Intake    Calories: meeting estimated needs  Protein: meeting estimated needs    Patient Education    Not applicable.    Nutrition Diagnosis     PES: Inadequate oral intake related to acute illness as evidenced by intubation since 23. (continues)    Interventions/Goals     Intervention(s): modified composition of enteral nutrition, modified rate of enteral nutrition, and collaboration with other providers  Goal: Meet greater than 75% of nutritional needs by follow-up. (goal progressing)    Monitoring & Evaluation     Dietitian will monitor energy intake.  Nutrition Risk/Follow-Up: high (follow-up in 1-4 days)   Please consult if re-assessment needed sooner.

## 2023-07-11 NOTE — PRE ADMISSION SCREENING
[facility]    Pre-Admission Patient Screening        Pre-Screen type:  LTAC    Facility Status: Pending Review    Referring Physician:  DR RAMOS    Admitting Physician:  Rafael Tafoya MD    Primary Care Physician:  Areli Vargas PA-C    History         Patient Active Problem List    Diagnosis Date Noted    Abscess of bursa of right knee 06/28/2023    Closed displaced spiral fracture of shaft of right tibia 08/10/2022    Neurogenic bladder 05/26/2022    Anemia 05/26/2022    Chronic ulcer of ankle 05/26/2022    Fracture of distal end of tibia 05/26/2022    Gastroesophageal reflux disease without esophagitis 05/26/2022    Generalized anxiety disorder 05/26/2022    Osteomyelitis 05/26/2022    Presence of suprapubic catheter 05/26/2022    Pure hypercholesterolemia 05/26/2022    Chronic pain 08/09/2019    Hypertension 08/09/2019    Type 2 diabetes mellitus 08/09/2019    Frequent UTI 07/02/2019    Dysphagia 04/20/2018    Spinal cord injury at T1-T6 level 04/20/2018         Previous Specialties/Consulted physicians:      Infectious Diseases , Nephrology , Wound Care , and Other: ORTHO, UROLOGY      Past and Current Medical History    Past Medical History:   Diagnosis Date    Arthritis     Chronic ulcer of ankle 05/26/2022    Frequent UTI 07/02/2019    Generalized anxiety disorder 05/26/2022    Neurogenic bladder 05/26/2022    Osteomyelitis 05/26/2022    Presence of suprapubic catheter 05/26/2022    Pure hypercholesterolemia 05/26/2022    Retention of urine, unspecified 08/09/2019    Spinal cord injury at T1-T6 level 04/20/2018           History of Present Illness   Bianca Khan is a 58 y.o. male with PMH of paraplegia 2/2 spinal cord injury (T1-T6), neurogenic bladder with suprapubic catheter, chronic right ankle osteomyelitis, DM II, HTN, who presented to the ED on 6/28/2023 with CC of right knee pain. Per chart review, CT of right knee revealed 5 cm area of subcutaneous fluid in prepatellar  region which was aspirated in the ED; he was taken to the OR on 6/29/2023 by orthopedic surgery team and was found to have prepatellar bursa infection and septic arthritis. Wound culture 6/29/2023 positive for strep agalactiae. Orthopedic surgery team recommended right-sided above-knee amputation d/t chronically infected hardware in right lower extremity. On 7/4/2023, a RRT was called d/t acute respiratory distress that was not improving despite being placed on vapotherm at 35 L/min with FiO2 100%. At the time of examination, patient's initial GCS was 10 but progressively reduced to a GCS of 6. Shared decision with patient's wife was made to intubate patient for airway protection though ABGs were within normal limits. He is admitted to the ICU for close monitoring and further medical management.     Hospital Course/Significant events:  6/29/2023 - I&D of right knee  7/4/2023 - Admitted to ICU, intubated for airway protection d/t altered mental status. Trialysis catheter placed and HD began         24 Hour Interval History:  Patient had episode of fever yesterday AM with Tmax 100.7. Repeat blood cultures drawn the night before are NG@48hr. Underwent HD with 3L ultrafiltration. Repeat CXR with mild worsening of opacities. Patient remains on Meropenum and Doxycycline day 8. Patient still on mechanical ventilation. Precedex at 1.4 and Propofol at 40. Adjusting PRN BP med parameters as patient goes up when Propofol is lowered.    Mechanical Ventilation Support:  Vent Mode: A/C (07/11/23 0452)  Set Rate: 20 BPM (07/11/23 0452)  Vt Set: 450 mL (07/11/23 0452)  Pressure Support: 10 cmH20 (07/05/23 0736)  PEEP/CPAP: 8 cmH20 (07/11/23 0452)  Oxygen Concentration (%): 40 (07/11/23 0452)  Peak Airway Pressure: 34 cmH20 (07/11/23 0452)  Total Ve: 7.6 L/m (07/11/23 0452)  F/VT Ratio<105 (RSBI): (!) 73.1 (07/11/23 0452)   Assessment  Extensive bilateral pulmonary infiltrates  DX:  Infectious pneumonia versus ARDS versus  hydrostatic/non hydrostatic pulmonary edema   Sputum culture showed moderate yeast  Intubated 07/04/2023  Prepatellar bursitis/septic arthritis of the right knee (strep agalactiae)   Post I&D on 06/29/2023   Ortho no longer planning for right above-the-knee amputation in near future due to infectious suppression  Acute renal failure, oliguric  Initiation of hemodialysis on 07/04/2023  Acute encephalopathy, likely metabolic related to above, currently clouded by sedation   Reported paroxysmal atrial fibrillation with permenant pacemaker, currently in sinus rhythm   H/o chronic paraplegia at T1 2/2 spinal cordy injury, neurogenic bladder, chronic right ankle osteomyelitis, DM2, HTN     Plan  -Continue ICU level of care for ongoing monitoring and medical management  -ID, urology, orthopedics, wound care teams following, appreciate their assistance   -HD/Ultrafiltration as per nephrology, 3L taken off yesterday  -Continue IV meropenem and doxycycline (day 8); no culture growth to date (drawn 7/4/23); repeat cultures (7/9/23) NG@48hr  -titrate mechanical ventilation for ARDS net protocol. plan for spontaneous breathing trial today?     DVT Prophylaxis: LVX 30   GI Prophylaxis: Pepcid 20      Progress Note  Nephrology   Follow-up For:  ANTON / ATN     Interval History:  57 Y/O male with history of paraplegia with neurogenic bladder with supra pubic cath and frequent infection admitted to hospital for Right knee infection and need of antibiotics   Pt also had oliguria with increasing BUN and CR   Creatinin was 8.12  started on hemodialysis   Had HD today and 3 liter of fluid removed   Still intubated and on vent      Review of Systems:  Nurse reports no new problem   Constitutional: No fever or chills  Respiratory: No cough or shortness of breath , on vent   Cardiovascular: No chest pain or palpitations  Gastrointestinal: No nausea or vomiting , is on NG tube feeding   Neurological: sedated , on vent   ASSESSMENT/PLAN:   1    ANTON / ATN  2   CKD , stage unspecified  3   SHPT  4   paraplegia  5   neurogenic bladder . S/P suprapubic cath  6   Anemia  7   HTN  8   DM 2  9   A Fib  10  Right knee infection   12   respiratory failure / on vent     Infectious Diseases Progress Note 07/09/23  58-year-old male with past medical history of T-spine cord injury with paraplegia, diabetes, HTN, hepatitis-C, chronic right ankle wound/osteomyelitis, was on suppressive doxycycline, known to my service, seen by us initially at [facility] and has followed with us at the office for chronic osteomyelitis, is admitted to [facility] on 06/28/2023 presenting through the ED with complaints of pain and swelling to his right knee, apparently struck his knee.  He did also report prior remote right knee surgery.  He was evaluated and noted to have no fevers initially but a temperature of 100.2° today 6/29, no leukocytosis but with thrombocytosis of up to 531 today.  .2 and ESR >130.  He is anemic with low albumin.  Blood cultures have been negative.  CT scan of the right knee noted a 5 cm area of subcutaneous fluid collection in the prepatellar region.  There was aspiration in the ER with findings of purulent fluid and cultures showing group B Streptococcus.  He was seen by the orthopedic surgery team with findings of right prepatellar bursa abscess and is status post incision and drainage of right knee septic arthritis and right prepatellar bursa abscess today 6/29 with cultures pending.    He is on Merrem.     Subjective:  Remains in the ICU, orally intubated on ventilator.  No new complaints, fever spike noted, doing about the same otherwise, in no acute distress.     Physical Exam:   Physical Exam  Vitals reviewed.   Constitutional:       General: He is not in acute distress.     Appearance: He is ill-appearing.   HENT:      Head: Normocephalic and atraumatic.      Mouth/Throat:      Comments: ET tube in  place  Cardiovascular:      Rate and Rhythm: Normal rate and regular rhythm.      Heart sounds: Normal heart sounds.   Pulmonary:      Effort: No respiratory distress.      Comments: Coarse breath sounds on ventilator  Abdominal:      General: Bowel sounds are normal. There is no distension.      Palpations: Abdomen is soft.      Tenderness: There is no abdominal tenderness.   Genitourinary:     Comments: Suprapubic catheter  Musculoskeletal:      Cervical back: Neck supple.      Comments: Some contracture of lower extremities   Skin:     Findings: No rash.      Comments: Right knee with wound VAC. Right ankle and left heel wounds dressed   Neurological:      Comments: Unable to fully evaluate, sedated on ventilator   Psychiatric:      Comments: Not communicative     Assessment/Plan:  1. SIRS with fevers  2. Group B Streptococcus Right knee prepatellar abscess  3. Group B Streptococcus Right knee septic arthritis  4.  Anemia/reactive thrombocytosis  5.  Paraplegia secondary to T-spine cord injury  6. History of hepatitis-C   7. Chronic right ankle wound/osteomyelitis  8.  Diabetes    9.  Acute kidney injury  10. Acute hypoxic respiratory failure  11. Pulmonary edema with pleural effusions/ Pneumonitis      -Continue Merrem #6 and maintain chronic suppressive doxycycline    -Fever spike noted but seem to have trended down and leukocytosis trending down, follow  -7/4 blood cultures remain negative and sputum culture with moderate yeast.  7/8 repeat blood cultures negative so far, follow  -7/8 CXR is unchanged with persistent patchy airspace opacity with bibasilar effusions  -7/4 CT scan of the abdomen and pelvis and 7/3 chest x-ray results noted, likely dealing with pulmonary edema with pleural effusions and possibly superimposed infectious pneumonitis. We will get procalcitonin level for further evaluation  -6/28 right knee abscess culture with Group B Streptococcus  -Seen by Orthopedic surgery team calm s/p I&D of R  prepatellar bursa abscess and septic R knee with cultures yielding Group G Streptococcus  -6/28 blood cultures negative  -Multiple comorbidities, paraplegic with chronic pressure wounds, right ankle osteomyelitis with exposed bone on chronic suppressive doxycycline  -Plan for a long-term antibiotic coverage, about a 6 week course for GBS septic arthritis, following inflammatory markers as well as maintaining on chronic suppressive antibiotics with doxycycline  -We will continue surgical site care per Orthopedic surgery team  -We will continue wound care  -He is to continue management of his hepatitis-C as outpatient  -Renal impairment noted, HD per Nephrology, started 7/4  -7/3 Renal ultrasound results noted  -Continue ventilator management and ICU support per intensivist  -Discussed with nursing staff.         LTACH Admission Criteria:    Management of at least one of the following complex respiratory conditions:  Cardiac monitoring for dyspnea, electrolyte imbalances, post pacer insertion, significant arrhythmia, or syncope/pre-syncope  Mechanical ventilation/NIPPV  Active weaning process from mechanical ventilation  Oxygen greater than or equal to 40%    Must meet at least three of the following concomitant treatments/intervention daily unless notes: (excludes PO meds unless notes)  IV medication per therapeutic regimen  Cardiac Monitoring  Complex wound care  Hemodialysis/ultrafiltration  Laboratory assessment and medication adjustment(s)  Mechanical ventilation/NIPPV  Negative pressure wound therapy  Neurological assessment greater than or equal to 3 times a day  Oxygen and SaO2/ABG adjustments and greater than or equal to 28% supplemental O2  Rehab Therapy (PT/OT/ST) 1-3 hours a day greater than or equal to 5 days a week      LTACH more appropriate than other levels of care (eg, skilled nursing facility, home health care), as indicated by:    Clinical management of patient deemed too frequent and needed beyond  the capabilities of alternative levels of care as evidence by: Continued Titration of IV Medications and Active titration of oxygen     Patient is stable for transfer to LTACH, as indicated by ALL of the following:      Hypotension Absent     Cardiovascular status stable     Stable chest findings     Renal function accepctable   Pain adequately managed    Intake acceptable       No acute significant hepatic dysfunction (eg, new encephalopathy)   No acute severe unstable neurologic abnormalities (eg, Altered mental status that is severe or persistent, or evidence of ongoing CNS embolization or ischemia, worsening hydrocephalus)   No active bleeding or unstable disorders of hemostasis (eg, no recent need for transfusion, severe thrombocytopenia with bleeding)   No need for respiratory or other isolation, OR manageable at LTACH level of care    Long-term enteral feeding (eg, PEG) and intravenous access established, not needed, OR to be placed at LTACH level of care      Anticipated Discharge Disposition:    N/A       Patient Traveled outside of the [place] in the last 3 months? no     Patient discharged from this LTAC to SNF within the last 45 days? no    Patient discharged from this LTAC to Rehab within the last 27 days? no    Prior residence: home    Prior Post-Acute Services: N/A     Allergies:   Review of patient's allergies indicates:   Allergen Reactions    Baclofen Itching and Anxiety       Has patient received the current influenza vaccine (Oct 1 - March 31)? Unknown     Has patient received PPD skin test prior to admit? N/A     Code Status: Full Code    Orientation: Unable to assess (ventilated/sedated/aphasic)    Speech: unable to assess (ventilated/sedated/aphasic)    Vital Signs:   Vital Signs (Most Recent):  Temp: 98 °F (36.7 °C) (07/11/23 0400)  Pulse: 77 (07/11/23 0452)  Resp: (!) 25 (07/11/23 0452)  BP: (!) 178/104 (07/11/23 0400)  SpO2: 95 % (07/11/23 0452)  Body mass index is 27.51 kg/m².  Weight: 86.2  kg (190 lb) Vital Signs (24h Range):  Temp:  [98 °F (36.7 °C)-100.7 °F (38.2 °C)] 98 °F (36.7 °C)  Pulse:  [60-92] 77  Resp:  [18-26] 25  SpO2:  [93 %-100 %] 95 %  BP: ()/() 178/104     Date     Blood Pressure     Pulse     Respiratory Rate     O2 Saturation     Temperature         Bowel/Bladder: incontinent of bladder and incontinent of bowel  Bowel/Bladder Appliance: internal urinary catheter, Insertion Date: N/A    Dialysis: other: RIGHT IJ CATHETER         Hemodialysis Catheter 07/04/23 0900 right internal jugular (Active)   Line Necessity Review CRRT/HD 07/11/23 0710   Verification by X-ray Yes 07/11/23 0710   Site Assessment No drainage;No redness;No swelling;No warmth 07/11/23 0710   Line Securement Device Secured with sutures 07/11/23 0710   Dressing Type Central line dressing 07/11/23 0710   Dressing Status Intact;Clean;Dry 07/11/23 0710   Dressing Intervention Integrity maintained 07/11/23 0710   Date on Dressing 07/05/23 07/11/23 0710   Dressing Due to be Changed 07/12/23 07/11/23 0710   Venous Patency/Care heparin locked 07/11/23 0710   Arterial Patency/Care heparin locked 07/11/23 0710   Waveform Not being transduced 07/11/23 0710   Number of days: 7            Peripheral IV - Single Lumen 07/04/23 0600 20 G Left;Posterior Hand (Active)   Site Assessment Clean;Dry;Intact;No redness;No swelling 07/11/23 0710   Extremity Assessment Distal to IV No abnormal discoloration;No redness;No swelling;No warmth 07/11/23 0710   Line Status Flushed;Saline locked;Capped 07/11/23 0710   Dressing Status Clean;Dry;Intact 07/11/23 0710   Dressing Intervention Integrity maintained 07/11/23 0710   Number of days: 7            Peripheral IV - Single Lumen 07/04/23 0600 18 G Anterior;Left Forearm (Active)   Site Assessment Clean;Intact;Dry;No redness;No swelling 07/11/23 0710   Extremity Assessment Distal to IV No abnormal discoloration;No redness;No swelling;No warmth 07/11/23 0710   Line Status Infusing;Tubing  changed 07/11/23 0710   Dressing Status Clean;Dry;Intact 07/11/23 0710   Dressing Intervention Integrity maintained 07/11/23 0710   Number of days: 7            Midline Catheter Insertion/Assessment  - Single Lumen 07/02/23 2128 Right basilic vein (medial side of arm) (Active)   $ Midline Charges (Upon insertion) Bedside Insertion Performed Pt > 3 Years Old;Midline Catheter (Supply);Ultrasound Guide for Vascular Access 07/02/23 2153   Site Assessment Clean;Dry;Intact;No redness;No swelling 07/11/23 0710   IV Device Securement catheter securement device 07/11/23 0710   Line Status Flushed;Infusing;Tubing changed 07/11/23 0710   Extremity Circumference (cm) 31 cm 07/02/23 2153   Dressing Type CHG impregnated dressing/sponge 07/11/23 0710   Dressing Status Clean;Dry;Intact 07/11/23 0710   Dressing Intervention Integrity maintained 07/11/23 0710   Dressing Change Due 07/12/23 07/11/23 0400   Number of days: 8            NG/OG Tube 07/04/23 0653 Center mouth (Active)   Placement Check placement verified by x-ray 07/11/23 0400   Tube advanced (cm) 0 07/07/23 0800   Advancement advanced manually 07/06/23 0722   Distal Tube Length (cm) 60 07/09/23 0800   Tolerance no signs/symptoms of discomfort 07/11/23 0400   Securement secured to commercial device 07/11/23 0400   Clamp Status/Tolerance unclamped 07/11/23 0400   Suction Setting/Drainage Method suction at the bedside 07/11/23 0400   Insertion Site Appearance no redness, warmth, tenderness, skin breakdown, drainage 07/11/23 0400   Drainage Bile 07/11/23 0400   Flush/Irrigation flushed w/;water 07/11/23 0400   Feeding Type continuous;by pump 07/11/23 0400   Feeding Action feeding continued 07/11/23 0400   Current Rate (mL/hr) 35 mL/hr 07/10/23 2000   Goal Rate (mL/hr) 35 mL/hr 07/10/23 2000   Intake (mL) 353 mL 07/11/23 0529   Water Bolus (mL) 100 mL 07/08/23 1400   Tube Output(mL)(Include Discarded Residual) 0 mL 07/07/23 2148   Formula Name [employer] Renal 07/11/23 0408    Intake (mL) - Formula Tube Feeding 380 07/10/23 1729   Number of days: 7            Suprapubic Catheter 23 1544 100% silicone 22 Fr. (Active)   Clamp Status unclamped 23 0400   Dressing no dressing 23 0400   Characteristics no redness;no warmth;no drainage;no tenderness;no swelling 23 0400   Urine Color Yellow 23 0400   Collection Container Standard drainage bag 23 0400   Output (mL) 200 mL 23 0855   Number of days: 4       CBGs/Accuchecks: Yes     Precautions: Fall, Cardiac, and Aspiration    Restraints: No     Isolation Precautions: N/A       Facility-Administered Medications as of 2023   Medication Dose Route Frequency Provider Last Rate Last Admin    0.9%  NaCl infusion (for blood administration)   Intravenous Q24H PRN Omar Reza DO        [] 0.9%  NaCl infusion   Intravenous Continuous Yissel Proctor  mL/hr at 23 2356 New Bag at 23 2356    acetaminophen tablet 650 mg  650 mg Oral Q8H PRN Lexis Duong DO   650 mg at 07/10/23 0838    [COMPLETED] acetylcysteine 100 mg/ml (10%) solution 4 mL  4 mL Nebulization TID WAKE Omar Reza DO   4 mL at 23 1100    amLODIPine tablet 5 mg  5 mg Oral Daily Nemesio Hall MD   5 mg at 23 0841    atorvastatin tablet 20 mg  20 mg Oral Nightly Willy Martinez MD   20 mg at 07/10/23 2036    carvediloL tablet 6.25 mg  6.25 mg Oral BID Nemesio Hall MD   6.25 mg at 23 0841    dexmedetomidine (PRECEDEX) 400mcg/100mL 0.9% NaCL infusion  0-1.4 mcg/kg/hr Intravenous Continuous Favian Quiroz MD   Stopped at 23 0900    doxycycline tablet 100 mg  100 mg Oral Q12H Willy Martinez MD   100 mg at 23 0841    enoxaparin injection 30 mg  30 mg Subcutaneous Daily Phil Jonas MD   30 mg at 07/10/23 1703    epoetin franklin injection 20,000 Units  20,000 Units Subcutaneous Every Mon, Wed, Fri Nemesio Hall MD   20,000 Units at 07/10/23 1810    famotidine (PF) injection 20 mg   20 mg Intravenous Daily Lexis Cortés Ng, DO   20 mg at 23 0841    fenofibrate tablet 48 mg  48 mg Oral Daily Nemesio Hall MD   48 mg at 23 0846    [COMPLETED] fentaNYL injection 100 mcg  100 mcg Intravenous Once Khris Treadwell Jr., MD, FCCP   100 mcg at 23 1114    furosemide injection 40 mg  40 mg Intravenous BID Nemesio Hall MD   40 mg at 23 0841    [COMPLETED] furosemide injection 40 mg  40 mg Intravenous Once Jericherie Martinez, ANP   40 mg at 23 205    guaiFENesin 100 mg/5 ml syrup 400 mg  400 mg Per NG tube Q4H Khris Treadwell Jr., MD, FCCP   400 mg at 23 0526    hydrALAZINE injection 10 mg  10 mg Intravenous Q8H PRN Guido Rivas MD        [COMPLETED] iopamidoL (ISOVUE-370) injection 100 mL  100 mL Intravenous ONCE PRN Prabhu Shen, DO   100 mL at 23 1819    labetaloL injection 10 mg  10 mg Intravenous Q4H PRN Guido Rivas MD        [COMPLETED] lactulose 10 gram/15 ml solution 15 g  15 g Oral Once Luis Mcclure MD   15 g at 23 1441    [COMPLETED] lactulose 10 gram/15 ml solution 30 g  30 g Oral Once Luis Mcclure MD   30 g at 07/10/23 1242    [COMPLETED] meropenem (MERREM) 1 g in sodium chloride 0.9 % 100 mL IVPB (MB+)  1 g Intravenous Q12H Khris Treadwell Jr., MD, FCCP   Stopped at 07/10/23 2140    miconazole NITRATE 2 % top powder   Topical (Top) BID Yissel Proctor MD   Given at 23 0842    [] midazolam (VERSED) 1 mg/mL injection 2 mg  2 mg Intravenous Q5 Min Nabeel Tejada MD        [COMPLETED] midazolam (VERSED) 1 mg/mL injection 2 mg  2 mg Intravenous Once JAVI HuiP   2 mg at 23 1531    [COMPLETED] mupirocin 2 % ointment   Nasal BID Osmin Townsend MD   Given at 23    NORepinephrine 8 mg in dextrose 5% 250 mL infusion  0-3 mcg/kg/min Intravenous Continuous Khris Treadwell Jr., MD, FCCP 3.2 mL/hr at 23 1315 0.02 mcg/kg/min at 23 1315    oxybutynin tablet 5 mg  5 mg Oral BID  Willy Martinez MD   5 mg at 23 0841    [COMPLETED] pantoprazole (PROTONIX) 40 mg injection        40 mg at 23 1012    [COMPLETED] piperacillin-tazobactam (ZOSYN) 4.5 g in dextrose 5 % in water (D5W) 5 % 100 mL IVPB (MB+)  4.5 g Intravenous ED 1 Time SWAPNIL Murray   Stopped at 23 1950    propofol (DIPRIVAN) 10 mg/mL infusion  0-50 mcg/kg/min Intravenous Continuous Lexis Duong DO   Stopped at 23 0900    senna-docusate 8.6-50 mg per tablet 2 tablet  2 tablet Oral BID Luis Mcclure MD   2 tablet at 23 0841    [] sodium bicarbonate 150 mEq in dextrose 5 % (D5W) 1,000 mL infusion   Intravenous Continuous Luis Hill  mL/hr at 23 2203 New Bag at 23 2203    [COMPLETED] sodium bicarbonate solution 50 mEq  50 mEq Intravenous Once Phil Jonas MD   50 mEq at 23 1228    sodium bicarbonate tablet 650 mg  650 mg Oral BID Yissel Proctor MD   650 mg at 23 0841    sodium chloride 0.9% bolus 250 mL 250 mL  250 mL Intravenous PRN Nemesio Hall MD        sodium chloride 0.9% flush 10 mL  10 mL Intravenous PRN Willy Martinez MD        sodium chloride 3% nebulizer solution 4 mL  4 mL Nebulization Q4H Luis Mcclure MD   4 mL at 23 0738    [] sodium citrate-citric acid 500-334 mg/5 ml (BICITRA) 500-334 mg/5 mL solution             [COMPLETED] sodium citrate-citric acid 500-334 mg/5 ml solution 30 mL  30 mL Oral Once Nabeel Tejada MD   30 mL at 23 1011    sodium citrate-citric acid 500-334 mg/5 ml solution 30 mL  30 mL Oral Once Nabeel Tejada MD        [COMPLETED] vancomycin 2 g in dextrose 5 % 500 mL IVPB  2,000 mg Intravenous Once SWAPNIL Murray   Stopped at 23 2203     Outpatient Medications as of 2023   Medication Sig Dispense Refill    amitriptyline (ELAVIL) 50 MG tablet Take 50 mg by mouth every evening.      amLODIPine (NORVASC) 10 MG tablet 1 tablet      atorvastatin  (LIPITOR) 20 MG tablet Take 20 mg by mouth nightly.      busPIRone (BUSPAR) 5 MG Tab 1 tablet      carvediloL (COREG) 12.5 MG tablet Take 12.5 mg by mouth.      cyclobenzaprine (FLEXERIL) 10 MG tablet Take 10 mg by mouth 2 (two) times daily as needed.      doxycycline (VIBRAMYCIN) 100 MG Cap Take 100 mg by mouth once daily.      fenofibrate 160 MG Tab Take 160 mg by mouth.      FLUoxetine 20 MG capsule Take 20 mg by mouth.      gabapentin (NEURONTIN) 300 MG capsule 1 capsule      lisinopriL 10 MG tablet Take 10 mg by mouth Daily.      LORazepam (ATIVAN) 1 MG tablet Take 1 mg by mouth 2 (two) times daily.      melatonin (MELATIN) 3 mg tablet TAKE TWO TABLETS BY MOUTH EVERY NIGHT AT BEDTIME AS NEEDED FOR INSOMNIA.      meloxicam (MOBIC) 15 MG tablet Take 15 mg by mouth once daily.      metFORMIN (GLUCOPHAGE) 500 MG tablet SMARTSI Tablet(s) By Mouth Morning-Evening      oxybutynin (DITROPAN) 5 MG Tab Take 5 mg by mouth 2 (two) times daily.      oxyCODONE-acetaminophen (PERCOCET)  mg per tablet Take 1 tablet by mouth every 6 (six) hours as needed.      pantoprazole (PROTONIX) 40 MG tablet 1 tablet      temazepam (RESTORIL) 30 mg capsule Take 30 mg by mouth nightly.      traZODone (DESYREL) 50 MG tablet Take 50 mg by mouth nightly.      XARELTO 20 mg Tab SMARTSI Tablet(s) By Mouth Every Evening          Cardiovascular:    Cardiovascular Review: Requires Review    Telemetry: Yes    Rhythm:  NSR    AICD: Yes, date inserted: UNKNOWN      Respiratory:    Oxygen:  VENT DEPENDENT    CPT/Frequency: N/A    Incentive Spirometer/Frequency: N/A    CPAP/Settings: N/A    BiPAP/Settings: N/A    Oxygen Saturation:  SEE VENT SETTINGS BELOW    Suction Frequency: N/A    Mechanical Ventilation Support:  Vent Mode: A/C (23)  Set Rate: 20 BPM (23)  Vt Set: 450 mL (23)  Pressure Support: 10 cmH20 (23)  PEEP/CPAP: 8 cmH20 (23)  Oxygen Concentration (%): 40 (23 0452)  Peak  "Airway Pressure: 34 cmH20 (07/11/23 0452)  Total Ve: 7.6 L/m (07/11/23 0452)  F/VT Ratio<105 (RSBI): (!) 73.1 (07/11/23 0452)    Vent Settings:   Mode:   Rate:   TV:   FIO2:   PEEP:   PS:   iTime:    Trial/HS Settings:   Mode:   Rate:   TV:   FIO2:   PEEP:   PS:       Nutrition:      Ht Readings from Last 3 Encounters:   07/05/23 5' 9.69" (1.77 m)   09/13/22 5' 10" (1.778 m)   08/25/22 5' 10" (1.778 m)     Wt Readings from Last 1 Encounters:   07/04/23 1114 86.2 kg (190 lb)   06/28/23 2237 86.2 kg (190 lb)   06/28/23 1346 86.2 kg (190 lb)       Feeding Status:   Current Diet: NPO   Supplements:N/A   Tube Feeding:  SEE BELOW  Nutrition Recommendation/Prescription      Tube feeding recommendation:   Novasource Renal goal rate 35 ml/hr + 4 packets ProSource NoCarb daily to provide  1640 kcal/d (96% est needs, 116% with meds)  123 g protein/d (95% est needs)  504 ml free water/d  (calculations based on estimated 20 hr/d run time)      Also add Malachi (provides 90 kcal, 2.5 g protein per serving) BID.  Enteral Nutrition     Formula: Novasource Renal  Rate/Volume: 35ml/hr  Water Flushes: 50ml q4hr  Additives/Modulars: Prosource No Carb  Route: orogastric tube  Method: continuous  Total Nutrition Provided by Tube Feeding, Additives, and Flushes:  Calories Provided  1640 kcal/d, 96% needs   Protein Provided  123 g/d, 95% needs   Fluid Provided  504 ml/d, N/A% needs         Flushes:  SEE  ABOVE      Integumentary:    Integumentary: Other: SEE NOTE BELOW            Negative Pressure Wound Therapy  06/29/23 1120 Right anterior (Active)   06/29/23 1120   Side: Right   Orientation: anterior   Location: Leg   Additional Comments:    Removal Indication and Assessment:    Location:    SDO Location:    NPWT Type Vacuum Therapy 07/10/23 2000   Therapy Setting NPWT Continuous therapy 07/10/23 2000   Pressure Setting NPWT 125 mmHg 07/10/23 2000   Therapy Interventions NPWT Seal intact 07/10/23 2000   Sponges Inserted NPWT Black;White " 07/08/23 0800   Sponges Removed NPWT White 07/08/23 0800   General Output (mL) 200 07/05/23 1815   Number of days: 12            Altered Skin Integrity 06/28/23 2230 medial Sacral spine #1 (Active)   06/28/23 2230   Altered Skin Integrity Present on Admission - Did Patient arrive to the hospital with altered skin?: yes   Side:    Orientation: medial   Location: Sacral spine   Wound Number: #1   Is this injury device related?: No   Primary Wound Type:    Description of Altered Skin Integrity:    Ankle-Brachial Index:    Pulses:    Removal Indication and Assessment:    Wound Outcome:    (Retired) Wound Length (cm):    (Retired) Wound Width (cm):    (Retired) Depth (cm):    Wound Description (Comments):    Removal Indications:    Wound Image   06/30/23 1101   Description of Altered Skin Integrity Partial thickness tissue loss. Shallow open ulcer with a red or pink wound bed, without slough. Intact or Open/Ruptured Serum-filled blister. 07/10/23 2000   Dressing Appearance Dry;Intact;Clean 07/10/23 2000   Drainage Amount None 07/10/23 2000   Drainage Characteristics/Odor No odor 07/10/23 2000   Appearance Dressing in place, unable to visualize 07/10/23 2000   Tissue loss description Partial thickness 07/10/23 2000   Red (%), Wound Tissue Color 100 % 06/30/23 1101   Wound Edges Irregular 07/10/23 2000   Wound Length (cm) 4.5 cm 06/30/23 1101   Wound Width (cm) 4.5 cm 06/30/23 1101   Wound Surface Area (cm^2) 20.25 cm^2 06/30/23 1101   Care Cleansed with:;Wound cleanser 07/05/23 2000   Dressing Foam 07/10/23 2000   Number of days: 13            Altered Skin Integrity 06/28/23 2230 Right posterior Buttocks #2 (Active)   06/28/23 2230   Altered Skin Integrity Present on Admission - Did Patient arrive to the hospital with altered skin?: yes   Side: Right   Orientation: posterior   Location: Buttocks   Wound Number: #2   Is this injury device related?:    Primary Wound Type:    Description of Altered Skin Integrity:     Ankle-Brachial Index:    Pulses:    Removal Indication and Assessment:    Wound Outcome:    (Retired) Wound Length (cm):    (Retired) Wound Width (cm):    (Retired) Depth (cm):    Wound Description (Comments):    Removal Indications:    Wound Image   06/28/23 2235   Description of Altered Skin Integrity Partial thickness tissue loss. Shallow open ulcer with a red or pink wound bed, without slough. Intact or Open/Ruptured Serum-filled blister. 07/10/23 2000   Dressing Appearance Intact;Clean;Dry 07/10/23 2000   Drainage Amount None 07/10/23 2000   Drainage Characteristics/Odor Serosanguineous 07/08/23 0800   Appearance Dressing in place, unable to visualize 07/09/23 0800   Care Cleansed with:;Sterile normal saline 07/05/23 2000   Dressing Foam 07/10/23 2000   Number of days: 13            Altered Skin Integrity 06/28/23 2230 midline Perineum #3 (Active)   06/28/23 2230   Altered Skin Integrity Present on Admission - Did Patient arrive to the hospital with altered skin?: yes   Side:    Orientation: midline   Location: Perineum   Wound Number: #3   Is this injury device related?: No   Primary Wound Type:    Description of Altered Skin Integrity:    Ankle-Brachial Index:    Pulses:    Removal Indication and Assessment:    Wound Outcome:    (Retired) Wound Length (cm):    (Retired) Wound Width (cm):    (Retired) Depth (cm):    Wound Description (Comments):    Removal Indications:    Wound Image   06/28/23 2235   Description of Altered Skin Integrity Partial thickness tissue loss. Shallow open ulcer with a red or pink wound bed, without slough. Intact or Open/Ruptured Serum-filled blister. 07/10/23 2000   Dressing Appearance Intact;Clean;Dry 07/10/23 2000   Drainage Amount None 07/10/23 2000   Drainage Characteristics/Odor No odor 07/08/23 2026   Appearance Dressing in place, unable to visualize 07/09/23 0800   Care Skin Barrier 07/10/23 2000   Number of days: 13            Altered Skin Integrity 06/28/23 2230 Left  posterior Buttocks #4 (Active)   06/28/23 2230   Altered Skin Integrity Present on Admission - Did Patient arrive to the hospital with altered skin?: yes   Side: Left   Orientation: posterior   Location: Buttocks   Wound Number: #4   Is this injury device related?:    Primary Wound Type:    Description of Altered Skin Integrity:    Ankle-Brachial Index:    Pulses:    Removal Indication and Assessment:    Wound Outcome:    (Retired) Wound Length (cm):    (Retired) Wound Width (cm):    (Retired) Depth (cm):    Wound Description (Comments):    Removal Indications:    Wound Image   06/30/23 1101   Description of Altered Skin Integrity Partial thickness tissue loss. Shallow open ulcer with a red or pink wound bed, without slough. Intact or Open/Ruptured Serum-filled blister. 07/10/23 2000   Dressing Appearance Dry;Intact;Clean 07/10/23 2000   Drainage Amount None 07/10/23 2000   Drainage Characteristics/Odor No odor 07/10/23 2000   Appearance Pink 07/10/23 2000   Tissue loss description Partial thickness 07/08/23 2026   Red (%), Wound Tissue Color 100 % 06/30/23 1101   Periwound Area Macerated 07/08/23 0800   Wound Edges Irregular 07/08/23 2026   Wound Length (cm) 3 cm 06/30/23 1101   Wound Width (cm) 3 cm 06/30/23 1101   Wound Surface Area (cm^2) 9 cm^2 06/30/23 1101   Care Cleansed with:;Sterile normal saline;Skin Barrier;Applied: 07/05/23 2000   Dressing Foam 07/10/23 2000   Number of days: 13            Altered Skin Integrity 06/28/23 2230 Left posterior Heel #5 (Active)   06/28/23 2230   Altered Skin Integrity Present on Admission - Did Patient arrive to the hospital with altered skin?: yes   Side: Left   Orientation: posterior   Location: Heel   Wound Number: #5   Is this injury device related?: No   Primary Wound Type:    Description of Altered Skin Integrity:    Ankle-Brachial Index:    Pulses:    Removal Indication and Assessment:    Wound Outcome:    (Retired) Wound Length (cm):    (Retired) Wound Width (cm):     (Retired) Depth (cm):    Wound Description (Comments):    Removal Indications:    Wound Image   06/30/23 1101   Dressing Appearance Clean;Dry 07/10/23 2000   Drainage Amount None 07/10/23 2000   Drainage Characteristics/Odor No odor 07/10/23 2000   Appearance [place];White 07/10/23 2000   Periwound Area Dry 07/10/23 2000   Care Cleansed with:;Wound cleanser 07/05/23 2000   Dressing Foam 07/10/23 2000   Off Loading Off loading shoe 07/10/23 2000   Number of days: 13            Altered Skin Integrity 06/28/23 2230 Right lateral Ankle #6 (Active)   06/28/23 2230   Altered Skin Integrity Present on Admission - Did Patient arrive to the hospital with altered skin?: yes   Side: Right   Orientation: lateral   Location: Ankle   Wound Number: #6   Is this injury device related?: No   Primary Wound Type:    Description of Altered Skin Integrity:    Ankle-Brachial Index:    Pulses:    Removal Indication and Assessment:    Wound Outcome:    (Retired) Wound Length (cm):    (Retired) Wound Width (cm):    (Retired) Depth (cm):    Wound Description (Comments):    Removal Indications:    Wound Image   07/03/23 1100   Description of Altered Skin Integrity Full thickness tissue loss. Base is covered by slough and/or eschar in the wound bed 07/10/23 2000   Dressing Appearance Clean;Moist drainage 07/10/23 2000   Drainage Amount Scant 07/10/23 2000   Drainage Characteristics/Odor Yellow;Tan 07/10/23 2000   Appearance Slough 07/10/23 2000   Tissue loss description Full thickness 07/10/23 2000   Red (%), Wound Tissue Color 100 % 07/03/23 1100   Periwound Area Intact;Dry 07/10/23 2000   Wound Edges Irregular 07/10/23 2000   Wound Length (cm) 4 cm 07/03/23 1100   Wound Width (cm) 3 cm 07/03/23 1100   Wound Depth (cm) 0.6 cm 07/03/23 1100   Wound Volume (cm^3) 7.2 cm^3 07/03/23 1100   Wound Surface Area (cm^2) 12 cm^2 07/03/23 1100   Care Cleansed with:;Soap and water 07/08/23 2026   Dressing Rolled gauze 07/10/23 2000   Off Loading Off  loading shoe 07/10/23 2000   Number of days: 13            Incision/Site 06/29/23 1103 Right Leg (Active)   06/29/23 1103   Present Prior to Hospital Arrival?:    Side: Right   Location: Leg   Orientation:    Incision Type:    Closure Method:    Additional Comments:    Removal Indication and Assessment:    Wound Outcome:    Removal Indications:    Wound Image    07/06/23 1100   Incision WDL WDL 07/10/23 2000   Dressing Appearance Dry;Intact;Clean 07/10/23 2000   Drainage Amount Scant 07/10/23 2000   Drainage Characteristics/Odor Serosanguineous 07/10/23 2000   Appearance Dressing in place, unable to visualize 07/10/23 2000   Red (%), Wound Tissue Color 100 % 07/06/23 1100   Periwound Area Edematous 07/08/23 0800   Wound Edges Irregular 07/08/23 0800   Wound Length (cm) 3 cm 07/06/23 1100   Wound Width (cm) 1 cm 07/06/23 1100   Wound Depth (cm) 1 cm 07/06/23 1100   Wound Volume (cm^3) 3 cm^3 07/06/23 1100   Wound Surface Area (cm^2) 3 cm^2 07/06/23 1100   Tunneling (depth (cm)/location) 12 noon ~6cm 07/06/23 1100   Undermining (depth (cm)/location) 11-3 o ck 5cm 07/03/23 1100   Care Cleansed with:;Sterile normal saline 07/06/23 1100   Dressing Other (comment) 07/08/23 0800   Number of days: 12     Musculoskeletal:    Transfer assist: Activity did not occur    Weight Bearing Status: N/A    Comments:  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[employer]    ADL Assist: Activity did not occur    Special Equipment: N/A    Radiology:    Radiology (Last 168 hours)               07/10 1552 X-Ray Chest 1 View       07/08 1307 X-Ray Chest 1 View       07/06 1921 X-Ray Chest 1 View       07/06 0459 X-Ray Chest 1 View       07/05 0457 X-Ray Chest 1 View       07/04 2120 XR Gastric tube check, non-radiologist performed       07/04 1151 Echo       07/04 0952 X-Ray Chest 1 View       07/04 0439 X-Ray Chest 1 View       06/28 0000 CARDIAC MONITORING STRIPS              X-Ray Chest 1 View  Narrative: EXAMINATION:  XR CHEST 1  VIEW    CLINICAL HISTORY:  change in resp status;    TECHNIQUE:  Frontal view(s) of the chest.    COMPARISON:  Radiography 07/08/2023    FINDINGS:  Similar positioning of endotracheal tube, right IJ catheter and visualized enteric tube.  Persistent bilateral mid and lower lung opacities with mild overall worsening.  No definitive pneumothorax.  Small bilateral pleural effusions.  Impression: Mild overall worsening of bilateral mid/lower lung opacities.    Electronically signed by: Gustavo Cassidy  Date:    07/10/2023  Time:    16:03      Lab/Cultures:    Blood Urea Nitrogen   Date Value Ref Range Status   07/11/2023 75.8 (H) 8.4 - 25.7 mg/dL Final   07/10/2023 52.8 (H) 8.4 - 25.7 mg/dL Final     Creatinine   Date Value Ref Range Status   07/11/2023 4.30 (H) 0.73 - 1.18 mg/dL Final   07/10/2023 3.82 (H) 0.73 - 1.18 mg/dL Final     WBC   Date Value Ref Range Status   07/10/2023 11.71 (H) 4.50 - 11.50 x10(3)/mcL Final   07/09/2023 12.19 (H) 4.50 - 11.50 x10(3)/mcL Final      CULTURE, BLOOD ([facility])   Date Value Ref Range Status   07/08/2023 No Growth At 48 Hours  Preliminary   07/08/2023 No Growth At 48 Hours  Preliminary     Wound Culture   Date Value Ref Range Status   06/29/2023 Few Streptococcus agalactiae (Group B) (A)  Final     Recent Labs     07/11/23  0653   PH 7.410   PO2 78.0*   HCO3 31.7

## 2023-07-11 NOTE — PROGRESS NOTES
[facility] - [facility]  Pulmonary Critical Care Note    Patient Name: Bianca Khan  MRN: [id number]  Admission Date: 6/28/2023  Hospital Length of Stay: 13 days  Code Status: Full Code  Attending Provider: Khris Treadwell Jr., MD, *  Primary Care Provider: Areli Vargas PA-C     Subjective:     HPI:   Bianca Khan is a 58 y.o. male with PMH of paraplegia 2/2 spinal cord injury (T1-T6), neurogenic bladder with suprapubic catheter, chronic right ankle osteomyelitis, DM II, HTN, who presented to the ED on 6/28/2023 with CC of right knee pain. Per chart review, CT of right knee revealed 5 cm area of subcutaneous fluid in prepatellar region which was aspirated in the ED; he was taken to the OR on 6/29/2023 by orthopedic surgery team and was found to have prepatellar bursa infection and septic arthritis. Wound culture 6/29/2023 positive for strep agalactiae. Orthopedic surgery team recommended right-sided above-knee amputation d/t chronically infected hardware in right lower extremity. On 7/4/2023, a RRT was called d/t acute respiratory distress that was not improving despite being placed on vapotherm at 35 L/min with FiO2 100%. At the time of examination, patient's initial GCS was 10 but progressively reduced to a GCS of 6. Shared decision with patient's wife was made to intubate patient for airway protection though ABGs were within normal limits. He is admitted to the ICU for close monitoring and further medical management.    Hospital Course/Significant events:  6/29/2023 - I&D of right knee  7/4/2023 - Admitted to ICU, intubated for airway protection d/t altered mental status. Trialysis catheter placed and HD began       24 Hour Interval History:  Patient had episode of fever yesterday AM with Tmax 100.7. Repeat blood cultures drawn the night before are NG@48hr. Underwent HD with 3L ultrafiltration. Repeat CXR with mild worsening of opacities. Patient remains on Meropenum and Doxycycline day 8.  Patient still on mechanical ventilation. Precedex at 1.4 and Propofol at 40. Adjusting PRN BP med parameters as patient goes up when Propofol is lowered.    Past Medical History:   Diagnosis Date    Arthritis     Chronic ulcer of ankle 05/26/2022    Frequent UTI 07/02/2019    Generalized anxiety disorder 05/26/2022    Neurogenic bladder 05/26/2022    Osteomyelitis 05/26/2022    Presence of suprapubic catheter 05/26/2022    Pure hypercholesterolemia 05/26/2022    Retention of urine, unspecified 08/09/2019    Spinal cord injury at T1-T6 level 04/20/2018       Past Surgical History:   Procedure Laterality Date    INCISION AND DRAINAGE, LOWER EXTREMITY Right 6/29/2023    Procedure: INCISION AND DRAINAGE, LOWER EXTREMITY;  Surgeon: Prabhu Shen DO;  Location: [facility];  Service: Orthopedics;  Laterality: Right;  supine bone foam wash stuff cultures    INSERTION OF INTRAMEDULLARY MARISA Right 8/10/2022    Procedure: INSERTION, INTRAMEDULLARY MARISA RIGHT TIBIA;  Surgeon: Jorge Orellana MD;  Location: [facility];  Service: Orthopedics;  Laterality: Right;       Social History     Socioeconomic History    Marital status:    Tobacco Use    Smoking status: Every Day     Types: Cigars, Cigarettes    Smokeless tobacco: Never   Substance and Sexual Activity    Alcohol use: Yes     Alcohol/week: 2.0 standard drinks     Types: 2 Cans of beer per week    Drug use: Not Currently    Sexual activity: Never       Current Outpatient Medications   Medication Instructions    amitriptyline (ELAVIL) 50 mg, Oral, Nightly    amLODIPine (NORVASC) 10 MG tablet 1 tablet    atorvastatin (LIPITOR) 20 mg, Oral, Nightly    busPIRone (BUSPAR) 5 MG Tab 1 tablet    carvediloL (COREG) 12.5 mg, Oral    cyclobenzaprine (FLEXERIL) 10 mg, Oral, 2 times daily PRN    doxycycline (VIBRAMYCIN) 100 mg, Oral, Daily    fenofibrate 160 mg, Oral    FLUoxetine 20 mg, Oral    gabapentin (NEURONTIN) 300 MG capsule 1 capsule    lisinopriL 10 mg, Oral, Daily    LORazepam  (ATIVAN) 1 mg, Oral, 2 times daily    melatonin (MELATIN) 3 mg tablet TAKE TWO TABLETS BY MOUTH EVERY NIGHT AT BEDTIME AS NEEDED FOR INSOMNIA.    meloxicam (MOBIC) 15 mg, Oral, Daily    metFORMIN (GLUCOPHAGE) 500 MG tablet SMARTSI Tablet(s) By Mouth Morning-Evening    oxybutynin (DITROPAN) 5 mg, Oral, 2 times daily    oxyCODONE-acetaminophen (PERCOCET)  mg per tablet 1 tablet, Oral, Every 6 hours PRN    pantoprazole (PROTONIX) 40 MG tablet 1 tablet    temazepam (RESTORIL) 30 mg, Oral, Nightly    traZODone (DESYREL) 50 mg, Oral, Nightly    XARELTO 20 mg Tab SMARTSI Tablet(s) By Mouth Every Evening     Current Inpatient Medications   amLODIPine  5 mg Oral Daily    atorvastatin  20 mg Oral Nightly    carvediloL  6.25 mg Oral BID    doxycycline  100 mg Oral Q12H    enoxaparin  30 mg Subcutaneous Daily    epoetin franklin  20,000 Units Subcutaneous Every Mon, Wed, Fri    famotidine (PF)  20 mg Intravenous Daily    fenofibrate  48 mg Oral Daily    furosemide (LASIX) injection  40 mg Intravenous BID    guaiFENesin 100 mg/5 ml  400 mg Per NG tube Q4H    miconazole NITRATE 2 %   Topical (Top) BID    oxybutynin  5 mg Oral BID    senna-docusate 8.6-50 mg  2 tablet Oral BID    sodium bicarbonate  650 mg Oral BID    sodium chloride 3%  4 mL Nebulization Q4H    sodium citrate-citric acid 500-334 mg/5 ml  30 mL Oral Once     Current Intravenous Infusions   dexmedeTOMIDine (Precedex) infusion (titrating) 1.4 mcg/kg/hr (23 0148)    NORepinephrine bitartrate-D5W 0.02 mcg/kg/min (23 1315)    propofoL 40 mcg/kg/min (23 0433)       Objective:     Intake/Output Summary (Last 24 hours) at 2023 0454  Last data filed at 2023 0431  Gross per 24 hour   Intake 3042 ml   Output 3905 ml   Net -863 ml       Vital Signs (Most Recent):  Temp: 98 °F (36.7 °C) (23 0400)  Pulse: 77 (23 0452)  Resp: (!) 25 (23 045)  BP: (!) 178/104 (23 0400)  SpO2: 95 % (23)  Body mass index is  27.51 kg/m².  Weight: 86.2 kg (190 lb) Vital Signs (24h Range):  Temp:  [98 °F (36.7 °C)-100.7 °F (38.2 °C)] 98 °F (36.7 °C)  Pulse:  [60-92] 77  Resp:  [18-26] 25  SpO2:  [93 %-100 %] 95 %  BP: ()/() 178/104     Physical Exam  Vitals and nursing note reviewed.   Constitutional:       Interventions: He is sedated and intubated.   HENT:      Head: Normocephalic and atraumatic.      Nose: Nose normal.   Cardiovascular:      Rate and Rhythm: Normal rate and regular rhythm.      Pulses: Normal pulses.      Heart sounds: Normal heart sounds.   Pulmonary:      Effort: He is intubated.      Comments: Coarse lung sounds heard throughout the anterior chest.   Abdominal:      General: Bowel sounds are normal.      Palpations: Abdomen is soft.   Musculoskeletal:      Cervical back: Neck supple.   Skin:     General: Skin is warm and dry.   Neurological:      Comments: Unable to assess 2/2 patient sedation status at the time of exam   Psychiatric:      Comments: Unable to assess 2/2 patient sedation status at the time of exam       Lines/Drains/Airways       Central Venous Catheter Line  Duration                  Hemodialysis Catheter 07/04/23 0900 right internal jugular 6 days              Drain  Duration                  NG/OG Tube 07/04/23 0653 Center mouth 6 days         Suprapubic Catheter 07/06/23 1544 100% silicone 22 Fr. 4 days              Airway  Duration                  Airway - Non-Surgical 07/04/23 0422 Endotracheal Tube 7 days              Peripheral Intravenous Line  Duration                  Midline Catheter Insertion/Assessment  - Single Lumen 07/02/23 2128 Right basilic vein (medial side of arm) 8 days         Peripheral IV - Single Lumen 07/04/23 0600 18 G Anterior;Left Forearm 6 days         Peripheral IV - Single Lumen 07/04/23 0600 20 G Left;Posterior Hand 6 days                  Significant Labs:  Lab Results   Component Value Date    WBC 11.71 (H) 07/10/2023    HGB 10.0 (L) 07/10/2023    HCT  29.9 (L) 07/10/2023    MCV 74.9 (L) 07/10/2023     (H) 07/10/2023       BMP  Lab Results   Component Value Date     (L) 07/11/2023    K 4.4 07/11/2023    CHLORIDE 97 (L) 07/11/2023    CO2 22 07/11/2023    BUN 75.8 (H) 07/11/2023    CREATININE 4.30 (H) 07/11/2023    CALCIUM 8.1 (L) 07/11/2023    AGAP 9.0 08/29/2022    EGFRNONAA >60 04/27/2022     ABG  Recent Labs   Lab 07/10/23  1623   PH 7.410   PO2 105.0*   HCO3 31.7       Mechanical Ventilation Support:  Vent Mode: A/C (07/11/23 0452)  Set Rate: 20 BPM (07/11/23 0452)  Vt Set: 450 mL (07/11/23 0452)  Pressure Support: 10 cmH20 (07/05/23 0736)  PEEP/CPAP: 8 cmH20 (07/11/23 0452)  Oxygen Concentration (%): 40 (07/11/23 0452)  Peak Airway Pressure: 34 cmH20 (07/11/23 0452)  Total Ve: 7.6 L/m (07/11/23 0452)  F/VT Ratio<105 (RSBI): (!) 73.1 (07/11/23 0452)    Significant Imaging:  I have reviewed the pertinent imaging within the past 24 hours.    Assessment/Plan:     Assessment  Extensive bilateral pulmonary infiltrates  DX:  Infectious pneumonia versus ARDS versus hydrostatic/non hydrostatic pulmonary edema   Sputum culture showed moderate yeast  Intubated 07/04/2023  Prepatellar bursitis/septic arthritis of the right knee (strep agalactiae)   Post I&D on 06/29/2023   Ortho no longer planning for right above-the-knee amputation in near future due to infectious suppression  Acute renal failure, oliguric  Initiation of hemodialysis on 07/04/2023  Acute encephalopathy, likely metabolic related to above, currently clouded by sedation   Reported paroxysmal atrial fibrillation with permenant pacemaker, currently in sinus rhythm   H/o chronic paraplegia at T1 2/2 spinal cordy injury, neurogenic bladder, chronic right ankle osteomyelitis, DM2, HTN    Plan  -Continue ICU level of care for ongoing monitoring and medical management  -ID, urology, orthopedics, wound care teams following, appreciate their assistance   -HD/Ultrafiltration as per nephrology, 3L taken  off yesterday  -Continue IV meropenem and doxycycline (day 8); no culture growth to date (drawn 7/4/23); repeat cultures (7/9/23) NG@48hr  -titrate mechanical ventilation for ARDS net protocol. plan for spontaneous breathing trial today?     DVT Prophylaxis: LVX 30   GI Prophylaxis: Pepcid 20     34 minutes of critical care was time spent personally by me on the following activities: development of treatment plan with patient or surrogate and bedside caregivers, discussions with consultants, evaluation of patient's response to treatment, examination of patient, ordering and performing treatments and interventions, ordering and review of laboratory studies, ordering and review of radiographic studies, pulse oximetry, re-evaluation of patient's condition.  This critical care time did not overlap with that of any other provider or involve time for any procedures.     Jv Morgan MD  Pulmonary Critical Care Medicine  [facility] - [facility] ICU

## 2023-07-11 NOTE — NURSING
Nurses Note -- 4 Eyes      7/11/2023   4:42 AM      Skin assessed during: Q Shift Change      [] No Altered Skin Integrity Present    []Prevention Measures Documented      [x] Yes- Altered Skin Integrity Present or Discovered   [x] LDA Added if Not in Epic (Describe Wound)   [x] New Altered Skin Integrity was Present on Admit and Documented in LDA   [x] Wound Image Taken    Wound Care Consulted? Yes    Attending Nurse:  Michele Francois RN     Second RN/Staff Member:  YOLIE Abarca

## 2023-07-12 LAB
ALBUMIN SERPL-MCNC: 2 G/DL (ref 3.5–5)
ALBUMIN/GLOB SERPL: 0.5 RATIO (ref 1.1–2)
ALP SERPL-CCNC: 82 UNIT/L (ref 40–150)
ALT SERPL-CCNC: 8 UNIT/L (ref 0–55)
AST SERPL-CCNC: 23 UNIT/L (ref 5–34)
BASOPHILS # BLD AUTO: 0.05 X10(3)/MCL
BASOPHILS NFR BLD AUTO: 0.7 %
BILIRUBIN DIRECT+TOT PNL SERPL-MCNC: 0.4 MG/DL
BNP BLD-MCNC: 1060.2 PG/ML
BUN SERPL-MCNC: 64.2 MG/DL (ref 8.4–25.7)
CALCIUM SERPL-MCNC: 8.3 MG/DL (ref 8.4–10.2)
CHLORIDE SERPL-SCNC: 96 MMOL/L (ref 98–107)
CO2 SERPL-SCNC: 25 MMOL/L (ref 22–29)
CREAT SERPL-MCNC: 3.45 MG/DL (ref 0.73–1.18)
EOSINOPHIL # BLD AUTO: 0.05 X10(3)/MCL (ref 0–0.9)
EOSINOPHIL NFR BLD AUTO: 0.7 %
ERYTHROCYTE [DISTWIDTH] IN BLOOD BY AUTOMATED COUNT: 19.1 % (ref 11.5–17)
GFR SERPLBLD CREATININE-BSD FMLA CKD-EPI: 20 MLS/MIN/1.73/M2
GLOBULIN SER-MCNC: 4.4 GM/DL (ref 2.4–3.5)
GLUCOSE SERPL-MCNC: 118 MG/DL (ref 74–100)
HCT VFR BLD AUTO: 27.5 % (ref 42–52)
HGB BLD-MCNC: 9.3 G/DL (ref 14–18)
IMM GRANULOCYTES # BLD AUTO: 0.06 X10(3)/MCL (ref 0–0.04)
IMM GRANULOCYTES NFR BLD AUTO: 0.9 %
LYMPHOCYTES # BLD AUTO: 1.81 X10(3)/MCL (ref 0.6–4.6)
LYMPHOCYTES NFR BLD AUTO: 25.7 %
MAGNESIUM SERPL-MCNC: 2 MG/DL (ref 1.6–2.6)
MCH RBC QN AUTO: 24.9 PG (ref 27–31)
MCHC RBC AUTO-ENTMCNC: 33.8 G/DL (ref 33–36)
MCV RBC AUTO: 73.7 FL (ref 80–94)
MONOCYTES # BLD AUTO: 0.75 X10(3)/MCL (ref 0.1–1.3)
MONOCYTES NFR BLD AUTO: 10.6 %
NEUTROPHILS # BLD AUTO: 4.33 X10(3)/MCL (ref 2.1–9.2)
NEUTROPHILS NFR BLD AUTO: 61.4 %
NRBC BLD AUTO-RTO: 0 %
PLATELET # BLD AUTO: 414 X10(3)/MCL (ref 130–400)
PMV BLD AUTO: 10.1 FL (ref 7.4–10.4)
POTASSIUM SERPL-SCNC: 4.1 MMOL/L (ref 3.5–5.1)
PROT SERPL-MCNC: 6.4 GM/DL (ref 6.4–8.3)
RBC # BLD AUTO: 3.73 X10(6)/MCL (ref 4.7–6.1)
SODIUM SERPL-SCNC: 135 MMOL/L (ref 136–145)
WBC # SPEC AUTO: 7.05 X10(3)/MCL (ref 4.5–11.5)

## 2023-07-12 PROCEDURE — 25000003 PHARM REV CODE 250: Performed by: INTERNAL MEDICINE

## 2023-07-12 PROCEDURE — 63600175 PHARM REV CODE 636 W HCPCS: Performed by: STUDENT IN AN ORGANIZED HEALTH CARE EDUCATION/TRAINING PROGRAM

## 2023-07-12 PROCEDURE — 80100014 HC HEMODIALYSIS 1:1

## 2023-07-12 PROCEDURE — 83735 ASSAY OF MAGNESIUM: CPT | Performed by: INTERNAL MEDICINE

## 2023-07-12 PROCEDURE — 80053 COMPREHEN METABOLIC PANEL: CPT | Performed by: INTERNAL MEDICINE

## 2023-07-12 PROCEDURE — 63600175 PHARM REV CODE 636 W HCPCS: Performed by: INTERNAL MEDICINE

## 2023-07-12 PROCEDURE — 25000003 PHARM REV CODE 250

## 2023-07-12 PROCEDURE — 94761 N-INVAS EAR/PLS OXIMETRY MLT: CPT

## 2023-07-12 PROCEDURE — 99900026 HC AIRWAY MAINTENANCE (STAT)

## 2023-07-12 PROCEDURE — 63600175 PHARM REV CODE 636 W HCPCS: Mod: JZ,JG | Performed by: INTERNAL MEDICINE

## 2023-07-12 PROCEDURE — 20000000 HC ICU ROOM

## 2023-07-12 PROCEDURE — 99900035 HC TECH TIME PER 15 MIN (STAT)

## 2023-07-12 PROCEDURE — 27000221 HC OXYGEN, UP TO 24 HOURS

## 2023-07-12 PROCEDURE — 94003 VENT MGMT INPAT SUBQ DAY: CPT

## 2023-07-12 PROCEDURE — 85025 COMPLETE CBC W/AUTO DIFF WBC: CPT | Performed by: INTERNAL MEDICINE

## 2023-07-12 PROCEDURE — 83880 ASSAY OF NATRIURETIC PEPTIDE: CPT | Performed by: INTERNAL MEDICINE

## 2023-07-12 PROCEDURE — 25000003 PHARM REV CODE 250: Performed by: STUDENT IN AN ORGANIZED HEALTH CARE EDUCATION/TRAINING PROGRAM

## 2023-07-12 RX ORDER — SODIUM BICARBONATE 650 MG/1
650 TABLET ORAL 2 TIMES DAILY
Status: DISCONTINUED | OUTPATIENT
Start: 2023-07-12 | End: 2023-07-15

## 2023-07-12 RX ORDER — DOXYCYCLINE HYCLATE 100 MG
100 TABLET ORAL EVERY 12 HOURS
Status: DISCONTINUED | OUTPATIENT
Start: 2023-07-12 | End: 2023-08-09 | Stop reason: HOSPADM

## 2023-07-12 RX ORDER — FENOFIBRATE 48 MG/1
48 TABLET, FILM COATED ORAL DAILY
Status: DISCONTINUED | OUTPATIENT
Start: 2023-07-13 | End: 2023-08-09 | Stop reason: HOSPADM

## 2023-07-12 RX ORDER — AMLODIPINE BESYLATE 5 MG/1
5 TABLET ORAL DAILY
Status: DISCONTINUED | OUTPATIENT
Start: 2023-07-13 | End: 2023-07-14

## 2023-07-12 RX ORDER — OXYBUTYNIN CHLORIDE 5 MG/1
5 TABLET ORAL 2 TIMES DAILY
Status: DISCONTINUED | OUTPATIENT
Start: 2023-07-12 | End: 2023-08-09 | Stop reason: HOSPADM

## 2023-07-12 RX ORDER — ACETAMINOPHEN 325 MG/1
650 TABLET ORAL EVERY 8 HOURS PRN
Status: DISCONTINUED | OUTPATIENT
Start: 2023-07-12 | End: 2023-08-09 | Stop reason: HOSPADM

## 2023-07-12 RX ORDER — AMOXICILLIN 250 MG
2 CAPSULE ORAL 2 TIMES DAILY
Status: DISCONTINUED | OUTPATIENT
Start: 2023-07-12 | End: 2023-08-09 | Stop reason: HOSPADM

## 2023-07-12 RX ORDER — ATORVASTATIN CALCIUM 10 MG/1
20 TABLET, FILM COATED ORAL NIGHTLY
Status: DISCONTINUED | OUTPATIENT
Start: 2023-07-12 | End: 2023-08-09 | Stop reason: HOSPADM

## 2023-07-12 RX ORDER — CARVEDILOL 3.12 MG/1
6.25 TABLET ORAL 2 TIMES DAILY
Status: DISCONTINUED | OUTPATIENT
Start: 2023-07-12 | End: 2023-07-14

## 2023-07-12 RX ADMIN — FAMOTIDINE 20 MG: 10 INJECTION, SOLUTION INTRAVENOUS at 11:07

## 2023-07-12 RX ADMIN — PROPOFOL 40 MCG/KG/MIN: 10 INJECTION, EMULSION INTRAVENOUS at 09:07

## 2023-07-12 RX ADMIN — DOXYCYCLINE HYCLATE 100 MG: 100 TABLET, COATED ORAL at 08:07

## 2023-07-12 RX ADMIN — CARVEDILOL 6.25 MG: 3.12 TABLET, FILM COATED ORAL at 11:07

## 2023-07-12 RX ADMIN — FUROSEMIDE 40 MG: 10 INJECTION, SOLUTION INTRAMUSCULAR; INTRAVENOUS at 05:07

## 2023-07-12 RX ADMIN — CARVEDILOL 6.25 MG: 3.12 TABLET, FILM COATED ORAL at 08:07

## 2023-07-12 RX ADMIN — DEXMEDETOMIDINE HYDROCHLORIDE 1.4 MCG/KG/HR: 400 INJECTION INTRAVENOUS at 05:07

## 2023-07-12 RX ADMIN — DEXMEDETOMIDINE HYDROCHLORIDE 1 MCG/KG/HR: 400 INJECTION INTRAVENOUS at 09:07

## 2023-07-12 RX ADMIN — OXYBUTYNIN CHLORIDE 5 MG: 5 TABLET ORAL at 08:07

## 2023-07-12 RX ADMIN — DEXMEDETOMIDINE HYDROCHLORIDE 1.4 MCG/KG/HR: 400 INJECTION INTRAVENOUS at 04:07

## 2023-07-12 RX ADMIN — SODIUM BICARBONATE 650 MG TABLET 650 MG: at 08:07

## 2023-07-12 RX ADMIN — FENOFIBRATE 48 MG: 48 TABLET, FILM COATED ORAL at 11:07

## 2023-07-12 RX ADMIN — GUAIFENESIN 400 MG: 200 SOLUTION ORAL at 11:07

## 2023-07-12 RX ADMIN — GUAIFENESIN 400 MG: 200 SOLUTION ORAL at 01:07

## 2023-07-12 RX ADMIN — DEXMEDETOMIDINE HYDROCHLORIDE 1 MCG/KG/HR: 400 INJECTION INTRAVENOUS at 06:07

## 2023-07-12 RX ADMIN — GUAIFENESIN 400 MG: 200 SOLUTION ORAL at 09:07

## 2023-07-12 RX ADMIN — OXYBUTYNIN CHLORIDE 5 MG: 5 TABLET ORAL at 11:07

## 2023-07-12 RX ADMIN — DEXMEDETOMIDINE HYDROCHLORIDE 1 MCG/KG/HR: 400 INJECTION INTRAVENOUS at 01:07

## 2023-07-12 RX ADMIN — SENNOSIDES AND DOCUSATE SODIUM 2 TABLET: 50; 8.6 TABLET ORAL at 08:07

## 2023-07-12 RX ADMIN — DOXYCYCLINE HYCLATE 100 MG: 100 TABLET, COATED ORAL at 11:07

## 2023-07-12 RX ADMIN — PROPOFOL 40 MCG/KG/MIN: 10 INJECTION, EMULSION INTRAVENOUS at 01:07

## 2023-07-12 RX ADMIN — PROPOFOL 30 MCG/KG/MIN: 10 INJECTION, EMULSION INTRAVENOUS at 04:07

## 2023-07-12 RX ADMIN — GUAIFENESIN 400 MG: 200 SOLUTION ORAL at 05:07

## 2023-07-12 RX ADMIN — ENOXAPARIN SODIUM 30 MG: 30 INJECTION SUBCUTANEOUS at 04:07

## 2023-07-12 RX ADMIN — MICONAZOLE NITRATE 2 % TOPICAL POWDER: at 08:07

## 2023-07-12 RX ADMIN — ERYTHROPOIETIN 20000 UNITS: 10000 INJECTION, SOLUTION INTRAVENOUS; SUBCUTANEOUS at 01:07

## 2023-07-12 RX ADMIN — SENNOSIDES AND DOCUSATE SODIUM 2 TABLET: 50; 8.6 TABLET ORAL at 11:07

## 2023-07-12 RX ADMIN — DEXMEDETOMIDINE HYDROCHLORIDE 1.4 MCG/KG/HR: 400 INJECTION INTRAVENOUS at 09:07

## 2023-07-12 RX ADMIN — AMLODIPINE BESYLATE 5 MG: 5 TABLET ORAL at 11:07

## 2023-07-12 RX ADMIN — PROPOFOL 40 MCG/KG/MIN: 10 INJECTION, EMULSION INTRAVENOUS at 05:07

## 2023-07-12 RX ADMIN — MICONAZOLE NITRATE 2 % TOPICAL POWDER: at 11:07

## 2023-07-12 RX ADMIN — FUROSEMIDE 40 MG: 10 INJECTION, SOLUTION INTRAMUSCULAR; INTRAVENOUS at 11:07

## 2023-07-12 RX ADMIN — ATORVASTATIN CALCIUM 20 MG: 10 TABLET, FILM COATED ORAL at 08:07

## 2023-07-12 RX ADMIN — PROPOFOL 30 MCG/KG/MIN: 10 INJECTION, EMULSION INTRAVENOUS at 05:07

## 2023-07-12 RX ADMIN — NOREPINEPHRINE BITARTRATE 0.02 MCG/KG/MIN: 8 INJECTION, SOLUTION INTRAVENOUS at 09:07

## 2023-07-12 RX ADMIN — SODIUM BICARBONATE 650 MG TABLET 650 MG: at 11:07

## 2023-07-12 NOTE — PROGRESS NOTES
Progress Note  Nephrology    Admit Date: 6/28/2023   LOS: 14 days     SUBJECTIVE:     Follow-up For:  ANTON / ATN    Interval History:  59 Y/O male with history of paraplegia with neurogenic bladder and suprapubic catheter admitted to hospital for Right knee infection   Pt had oliguria and BUN and CR was increasing   Started on hemodialysi 3 times weekly   Had extra dialysis due to pulmonary congestion yesterday and 2500 cc removed   Had dialysis again today with 3500 cc removed   Still intubated and on vent     Review of Systems:  Nurse reports no new problem   Constitutional: No fever or chills  Respiratory: No cough or shortness of breath  Cardiovascular: No chest pain or palpitations  Gastrointestinal: No nausea or vomiting  Neurological: No confusion or weakness  Has suprapubic cath     OBJECTIVE:     Vital Signs Range (Last 24H):  Vitals:    07/12/23 1039   BP:    Pulse: (!) 58   Resp: 20   Temp:        Temp:  [97.9 °F (36.6 °C)-99.1 °F (37.3 °C)]   Pulse:  [58-74]   Resp:  [15-26]   BP: ()/(63-96)   SpO2:  [86 %-100 %]     I & O (Last 24H):  Intake/Output Summary (Last 24 hours) at 7/12/2023 1636  Last data filed at 7/12/2023 1135  Gross per 24 hour   Intake 2000.16 ml   Output 6251 ml   Net -4250.84 ml       Physical Exam:  Alert , oriented , in NAD   Intubated and sedated   Lungs   clear , no rales   Heart   RRR  Abdomen   soft , non tender   Ext  no edema     Laboratory Data:    Recent Labs   Lab 07/11/23  0125 07/12/23  0125   * 135*   K 4.4 4.1   CO2 22 25   BUN 75.8* 64.2*   CREATININE 4.30* 3.45*   GLUCOSE 121* 118*   CALCIUM 8.1* 8.3*   PHOS 6.5*  --      Lab Results   Component Value Date    .3 (H) 07/04/2023    CALCIUM 8.3 (L) 07/12/2023    CAION 1.13 07/11/2023    PHOS 6.5 (H) 07/11/2023     Lab Results   Component Value Date    IRON 35 (L) 12/30/2021    TIBC 284 12/30/2021    FERRITIN 24.99 12/30/2021       Medications:  Medication list was reviewed and changes noted under  Assessment/Plan.    Diagnostic Results:    Reviewed most recent CT/US/Echo/MRI    ASSESSMENT/PLAN:   1   ANTON / ATN  2   CKD , stage unspecified  3   SHPT  4   paraplegia  5   neurogenic bladder   6   Anemia  7   HTN  8   DM 2  9   A Fib   10  Right knee infection   11   respiratory failure on vent     Plan   labs in AM

## 2023-07-12 NOTE — NURSING
07/12/23 1135        Hemodialysis Catheter 07/04/23 0900 right internal jugular   Placement Date/Time: 07/04/23 0900   Present Prior to Hospital Arrival?: No  Hand Hygiene: Performed  Barrier Precautions: Performed  Skin Antisepsis: ChloraPrep  Hemodialysis Catheter Type: Temporary catheter  Location: right internal jugular  Insert...   Line Necessity Review CRRT/HD   Site Assessment No swelling;No warmth;No drainage;No redness   Line Securement Device Secured with sutures   Dressing Type CHG impregnated dressing/sponge;Central line dressing;Transparent (Tegaderm)   Dressing Status Clean;Dry;Intact   Dressing Intervention Integrity maintained   Date on Dressing 07/12/23   Dressing Due to be Changed 07/19/23   Venous Patency/Care normal saline locked   Arterial Patency/Care normal saline locked   Post-Hemodialysis Assessment   Blood Volume Processed (Liters) 73.2 L   Dialyzer Clearance Moderately streaked   Duration of Treatment 210 minutes   Total UF (mL) 3500 mL   Net Fluid Removal 3000   Patient Response to Treatment Tolerated well.   Post-Hemodialysis Comments Dialyzed x 3.5 hours.  Net uf of 3 liters.  Pt did require levophed gtt to be started during hd and did require titration.  No other issues.  CVC with good flows.  Deaccessed per p and p.  Clearguards applied.  CVC dressing changed 7/12/23 per ICU staff

## 2023-07-12 NOTE — NURSING
Nurses Note -- 4 Eyes      7/12/2023   4:57 PM      Skin assessed during: Daily Assessment      [] No Altered Skin Integrity Present    []Prevention Measures Documented      [x] Yes- Altered Skin Integrity Present or Discovered   [] LDA Added if Not in Epic (Describe Wound)   [] New Altered Skin Integrity was Present on Admit and Documented in LDA   [] Wound Image Taken    Wound Care Consulted? Yes    Attending Nurse:  Jackeline Domínguez RN     Second RN/Staff Member:  Eliazar Bull

## 2023-07-12 NOTE — PROGRESS NOTES
[facility] - [facility] ICU  Pulmonary Critical Care Note    Patient Name: Bianca Khan  MRN: [id number]  Admission Date: 6/28/2023  Hospital Length of Stay: 14 days  Code Status: Full Code  Attending Provider: Khris Treadwell Jr., MD, *  Primary Care Provider: Areli Vargas PA-C     Subjective:     HPI:   Bianca Khan is a 58 y.o. male with PMH of paraplegia 2/2 spinal cord injury (T1-T6), neurogenic bladder with suprapubic catheter, chronic right ankle osteomyelitis, DM II, HTN, who presented to the ED on 6/28/2023 with CC of right knee pain. Per chart review, CT of right knee revealed 5 cm area of subcutaneous fluid in prepatellar region which was aspirated in the ED; he was taken to the OR on 6/29/2023 by orthopedic surgery team and was found to have prepatellar bursa infection and septic arthritis. Wound culture 6/29/2023 positive for strep agalactiae. Orthopedic surgery team recommended right-sided above-knee amputation d/t chronically infected hardware in right lower extremity. On 7/4/2023, a RRT was called d/t acute respiratory distress that was not improving despite being placed on vapotherm at 35 L/min with FiO2 100%. At the time of examination, patient's initial GCS was 10 but progressively reduced to a GCS of 6. Shared decision with patient's wife was made to intubate patient for airway protection though ABGs were within normal limits. He is admitted to the ICU for close monitoring and further medical management.    Hospital Course/Significant events:  6/29/2023 - I&D of right knee  7/4/2023 - Admitted to ICU, intubated for airway protection d/t altered mental status. Trialysis catheter placed and HD began       24 Hour Interval History:  No febrile episodes past 24 hours. Repeat blood cultures drawn the night before are NG@72hr. Underwent HD with 2.5L ultrafiltration with 565 cc UO. Patient remains on Meropenem and Doxycycline day 9. Attempted extubation yesterday, repeat ABG showed  hypoxemia thus reintubated. Continued sedation with 30 of Propofol and 1.4 of Precedex.    Past Medical History:   Diagnosis Date    Arthritis     Chronic ulcer of ankle 05/26/2022    Frequent UTI 07/02/2019    Generalized anxiety disorder 05/26/2022    Neurogenic bladder 05/26/2022    Osteomyelitis 05/26/2022    Presence of suprapubic catheter 05/26/2022    Pure hypercholesterolemia 05/26/2022    Retention of urine, unspecified 08/09/2019    Spinal cord injury at T1-T6 level 04/20/2018       Past Surgical History:   Procedure Laterality Date    INCISION AND DRAINAGE, LOWER EXTREMITY Right 6/29/2023    Procedure: INCISION AND DRAINAGE, LOWER EXTREMITY;  Surgeon: Prabhu Shen DO;  Location: [facility] OR;  Service: Orthopedics;  Laterality: Right;  supine bone foam wash stuff cultures    INSERTION OF INTRAMEDULLARY MARISA Right 8/10/2022    Procedure: INSERTION, INTRAMEDULLARY MARISA RIGHT TIBIA;  Surgeon: Jorge Orellana MD;  Location: [facility];  Service: Orthopedics;  Laterality: Right;       Social History     Socioeconomic History    Marital status:    Tobacco Use    Smoking status: Every Day     Types: Cigars, Cigarettes    Smokeless tobacco: Never   Substance and Sexual Activity    Alcohol use: Yes     Alcohol/week: 2.0 standard drinks     Types: 2 Cans of beer per week    Drug use: Not Currently    Sexual activity: Never       Current Outpatient Medications   Medication Instructions    amitriptyline (ELAVIL) 50 mg, Oral, Nightly    amLODIPine (NORVASC) 10 MG tablet 1 tablet    atorvastatin (LIPITOR) 20 mg, Oral, Nightly    busPIRone (BUSPAR) 5 MG Tab 1 tablet    carvediloL (COREG) 12.5 mg, Oral    cyclobenzaprine (FLEXERIL) 10 mg, Oral, 2 times daily PRN    doxycycline (VIBRAMYCIN) 100 mg, Oral, Daily    fenofibrate 160 mg, Oral    FLUoxetine 20 mg, Oral    gabapentin (NEURONTIN) 300 MG capsule 1 capsule    lisinopriL 10 mg, Oral, Daily    LORazepam (ATIVAN) 1 mg, Oral, 2 times daily    melatonin (MELATIN) 3 mg  tablet TAKE TWO TABLETS BY MOUTH EVERY NIGHT AT BEDTIME AS NEEDED FOR INSOMNIA.    meloxicam (MOBIC) 15 mg, Oral, Daily    metFORMIN (GLUCOPHAGE) 500 MG tablet SMARTSI Tablet(s) By Mouth Morning-Evening    oxybutynin (DITROPAN) 5 mg, Oral, 2 times daily    oxyCODONE-acetaminophen (PERCOCET)  mg per tablet 1 tablet, Oral, Every 6 hours PRN    pantoprazole (PROTONIX) 40 MG tablet 1 tablet    temazepam (RESTORIL) 30 mg, Oral, Nightly    traZODone (DESYREL) 50 mg, Oral, Nightly    XARELTO 20 mg Tab SMARTSI Tablet(s) By Mouth Every Evening     Current Inpatient Medications   amLODIPine  5 mg Oral Daily    atorvastatin  20 mg Oral Nightly    carvediloL  6.25 mg Oral BID    cefTRIAXone (ROCEPHIN) IVPB  2 g Intravenous Q24H    doxycycline  100 mg Oral Q12H    enoxaparin  30 mg Subcutaneous Daily    epoetin franklin  20,000 Units Subcutaneous Every Mon, Wed, Fri    famotidine (PF)  20 mg Intravenous Daily    fenofibrate  48 mg Oral Daily    furosemide (LASIX) injection  40 mg Intravenous BID    guaiFENesin 100 mg/5 ml  400 mg Per NG tube Q4H    miconazole NITRATE 2 %   Topical (Top) BID    oxybutynin  5 mg Oral BID    senna-docusate 8.6-50 mg  2 tablet Oral BID    sodium bicarbonate  650 mg Oral BID    sodium citrate-citric acid 500-334 mg/5 ml  30 mL Oral Once     Current Intravenous Infusions   dexmedeTOMIDine (Precedex) infusion (titrating) 1 mcg/kg/hr (23 0117)    NORepinephrine bitartrate-D5W 0.02 mcg/kg/min (23 1315)    propofoL 40 mcg/kg/min (23 0501)       Objective:     Intake/Output Summary (Last 24 hours) at 2023 0519  Last data filed at 2023 0500  Gross per 24 hour   Intake 2257.96 ml   Output 3066 ml   Net -808.04 ml       Vital Signs (Most Recent):  Temp: 97.9 °F (36.6 °C) (23 2346)  Pulse: 64 (23 0305)  Resp: 20 (23 0305)  BP: 139/83 (23 0305)  SpO2: 96 % (23 0400)  Body mass index is 27.51 kg/m².  Weight: 86.2 kg (190 lb) Vital Signs (24h  Range):  Temp:  [97.9 °F (36.6 °C)-99.1 °F (37.3 °C)] 97.9 °F (36.6 °C)  Pulse:  [60-98] 64  Resp:  [15-41] 20  SpO2:  [80 %-100 %] 96 %  BP: (102-188)/() 139/83     Physical Exam  Vitals and nursing note reviewed.   Constitutional:       Interventions: He is sedated and intubated.   HENT:      Head: Normocephalic and atraumatic.      Nose: Nose normal.   Cardiovascular:      Rate and Rhythm: Normal rate and regular rhythm.      Pulses: Normal pulses.      Heart sounds: Normal heart sounds.   Pulmonary:      Effort: He is intubated.      Comments: Coarse lung sounds heard throughout the anterior chest.   Abdominal:      General: Bowel sounds are normal.      Palpations: Abdomen is soft.   Musculoskeletal:      Cervical back: Neck supple.   Skin:     General: Skin is warm and dry.   Neurological:      Comments: Unable to assess 2/2 patient sedation status at the time of exam   Psychiatric:      Comments: Unable to assess 2/2 patient sedation status at the time of exam       Lines/Drains/Airways       Central Venous Catheter Line  Duration                  Hemodialysis Catheter 07/04/23 0900 right internal jugular 7 days              Drain  Duration                  Suprapubic Catheter 07/06/23 1544 100% silicone 22 Fr. 5 days         NG/OG Tube 07/11/23 1425 Right nostril <1 day              Airway  Duration                  Airway - Non-Surgical 07/11/23 1420 Endotracheal Tube <1 day              Peripheral Intravenous Line  Duration                  Midline Catheter Insertion/Assessment  - Single Lumen 07/02/23 2128 Right basilic vein (medial side of arm) 9 days         Peripheral IV - Single Lumen 07/04/23 0600 18 G Anterior;Left Forearm 7 days         Peripheral IV - Single Lumen 07/04/23 0600 20 G Left;Posterior Hand 7 days                  Significant Labs:  Lab Results   Component Value Date    WBC 7.05 07/12/2023    HGB 9.3 (L) 07/12/2023    HCT 27.5 (L) 07/12/2023    MCV 73.7 (L) 07/12/2023      (H) 07/12/2023       BMP  Lab Results   Component Value Date     (L) 07/12/2023    K 4.1 07/12/2023    CHLORIDE 96 (L) 07/12/2023    CO2 25 07/12/2023    BUN 64.2 (H) 07/12/2023    CREATININE 3.45 (H) 07/12/2023    CALCIUM 8.3 (L) 07/12/2023    AGAP 9.0 08/29/2022    EGFRNONAA >60 04/27/2022     ABG  Recent Labs   Lab 07/11/23  1052   PH 7.440   PO2 53.0*   HCO3 31.2       Mechanical Ventilation Support:  Vent Mode: A/C (07/12/23 0400)  Ventilator Initiated: Yes (07/11/23 1420)  Set Rate: 20 BPM (07/12/23 0400)  Vt Set: 450 mL (07/12/23 0400)  Pressure Support: 10 cmH20 (07/12/23 0400)  PEEP/CPAP: 8 cmH20 (07/12/23 0400)  Oxygen Concentration (%): 40 (07/12/23 0400)  Peak Airway Pressure: 21 cmH20 (07/12/23 0400)  Total Ve: 7.2 L/m (07/12/23 0400)  F/VT Ratio<105 (RSBI): (!) 59.7 (07/12/23 0200)    Significant Imaging:  I have reviewed the pertinent imaging within the past 24 hours.    Assessment/Plan:     Assessment  Extensive bilateral pulmonary infiltrates  DX:  Infectious pneumonia versus ARDS versus hydrostatic/non hydrostatic pulmonary edema   Sputum culture showed moderate yeast  Intubated 07/04/2023  Prepatellar bursitis/septic arthritis of the right knee (strep agalactiae)   Post I&D on 06/29/2023   Ortho no longer planning for right above-the-knee amputation in near future due to infectious suppression  Acute renal failure, oliguric  Initiation of hemodialysis on 07/04/2023  Acute encephalopathy, likely metabolic related to above, currently clouded by sedation   Reported paroxysmal atrial fibrillation with permenant pacemaker, currently in sinus rhythm   H/o chronic paraplegia at T1 2/2 spinal cordy injury, neurogenic bladder, chronic right ankle osteomyelitis, DM2, HTN    Plan  -Continue ICU level of care for ongoing monitoring and medical management  -ID, urology, orthopedics, wound care teams following, appreciate their assistance   -HD/Ultrafiltration as per nephrology, 2.5L taken off  yesterday  -Continue IV meropenem and doxycycline (day 9); no culture growth to date (drawn 7/4/23); repeat cultures (7/9/23) NG@72hr  -titrate mechanical ventilation for ARDS net protocol.  -attempted extubation day prior, reintubated due to hypoxemia     DVT Prophylaxis: LVX 30   GI Prophylaxis: Pepcid 20     34 minutes of critical care was time spent personally by me on the following activities: development of treatment plan with patient or surrogate and bedside caregivers, discussions with consultants, evaluation of patient's response to treatment, examination of patient, ordering and performing treatments and interventions, ordering and review of laboratory studies, ordering and review of radiographic studies, pulse oximetry, re-evaluation of patient's condition.  This critical care time did not overlap with that of any other provider or involve time for any procedures.    Guido Rivas MD  Pulmonary Critical Care Medicine  [facility] - [facility] ICU

## 2023-07-12 NOTE — NURSING
Nurses Note -- 4 Eyes      7/12/2023   12:12 AM      Skin assessed during: Q Shift Change      [] No Altered Skin Integrity Present    []Prevention Measures Documented      [x] Yes- Altered Skin Integrity Present or Discovered   [x] LDA Added if Not in Epic (Describe Wound)   [x] New Altered Skin Integrity was Present on Admit and Documented in LDA   [x] Wound Image Taken    Wound Care Consulted? Yes    Attending Nurse:  Michele Francois RN     Second RN/Staff Member:  YOLIE Meléndez

## 2023-07-12 NOTE — PROGRESS NOTES
Infectious Diseases Progress Note  58-year-old male with past medical history of T-spine cord injury with paraplegia, diabetes, HTN, hepatitis-C, chronic right ankle wound/osteomyelitis, was on suppressive doxycycline, known to my service, seen by us initially at [facility] and has followed with us at the office for chronic osteomyelitis, is admitted to [facility] on 06/28/2023 presenting through the ED with complaints of pain and swelling to his right knee, apparently struck his knee.  He did also report prior remote right knee surgery.  He was evaluated and noted to have no fevers initially but a temperature of 100.2° today 6/29, no leukocytosis but with thrombocytosis of up to 531 today.  .2 and ESR >130.  He is anemic with low albumin.  Blood cultures have been negative.  CT scan of the right knee noted a 5 cm area of subcutaneous fluid collection in the prepatellar region.  There was aspiration in the ER with findings of purulent fluid and cultures showing group B Streptococcus.  He was seen by the orthopedic surgery team with findings of right prepatellar bursa abscess and is status post incision and drainage of right knee septic arthritis and right prepatellar bursa abscess today 6/29 with group B Streptococcus    Subjective:  No new complaints, no fevers, doing about the same.  Remains in the ICU, on ventilator in no acute distress    Past Medical History:   Diagnosis Date    Arthritis     Chronic ulcer of ankle 05/26/2022    Frequent UTI 07/02/2019    Generalized anxiety disorder 05/26/2022    Neurogenic bladder 05/26/2022    Osteomyelitis 05/26/2022    Presence of suprapubic catheter 05/26/2022    Pure hypercholesterolemia 05/26/2022    Retention of urine, unspecified 08/09/2019    Spinal cord injury at T1-T6 level 04/20/2018     Past Surgical History:   Procedure Laterality Date    INCISION AND DRAINAGE, LOWER EXTREMITY Right 6/29/2023    Procedure: INCISION  AND DRAINAGE, LOWER EXTREMITY;  Surgeon: Prabhu Shen DO;  Location: [facility] OR;  Service: Orthopedics;  Laterality: Right;  supine bone foam wash stuff cultures    INSERTION OF INTRAMEDULLARY MARISA Right 8/10/2022    Procedure: INSERTION, INTRAMEDULLARY MARISA RIGHT TIBIA;  Surgeon: Jorge Orellana MD;  Location: [facility] OR;  Service: Orthopedics;  Laterality: Right;     Social History     Socioeconomic History    Marital status:    Tobacco Use    Smoking status: Every Day     Types: Cigars, Cigarettes    Smokeless tobacco: Never   Substance and Sexual Activity    Alcohol use: Yes     Alcohol/week: 2.0 standard drinks     Types: 2 Cans of beer per week    Drug use: Not Currently    Sexual activity: Never       Review of Systems   Unable to perform ROS: Intubated       Review of patient's allergies indicates:   Allergen Reactions    Baclofen Itching and Anxiety         Scheduled Meds:   amLODIPine  5 mg Oral Daily    atorvastatin  20 mg Oral Nightly    carvediloL  6.25 mg Oral BID    doxycycline  100 mg Oral Q12H    enoxaparin  30 mg Subcutaneous Daily    epoetin franklin  20,000 Units Subcutaneous Every Mon, Wed, Fri    famotidine (PF)  20 mg Intravenous Daily    fenofibrate  48 mg Oral Daily    furosemide (LASIX) injection  40 mg Intravenous BID    guaiFENesin 100 mg/5 ml  400 mg Per NG tube Q4H    miconazole NITRATE 2 %   Topical (Top) BID    oxybutynin  5 mg Oral BID    senna-docusate 8.6-50 mg  2 tablet Oral BID    sodium bicarbonate  650 mg Oral BID    sodium citrate-citric acid 500-334 mg/5 ml  30 mL Oral Once     Continuous Infusions:   dexmedeTOMIDine (Precedex) infusion (titrating) 1.4 mcg/kg/hr (07/11/23 2142)    NORepinephrine bitartrate-D5W 0.02 mcg/kg/min (07/04/23 1315)    propofoL 40 mcg/kg/min (07/11/23 2142)     PRN Meds:sodium chloride, acetaminophen, hydrALAZINE, labetalol, sodium chloride 0.9%, sodium chloride 0.9%    Objective:  /66   Pulse 64   Temp 99.1 °F (37.3 °C) (Oral)   Resp 20   Ht  "5' 9.69" (1.77 m)   Wt 86.2 kg (190 lb)   SpO2 99%   BMI 27.51 kg/m²     Physical Exam:   Physical Exam  Vitals reviewed.   Constitutional:       General: He is not in acute distress.     Appearance: He is ill-appearing.   HENT:      Head: Normocephalic and atraumatic.      Mouth/Throat:      Comments: ET tube in place  Cardiovascular:      Rate and Rhythm: Normal rate and regular rhythm.      Heart sounds: Normal heart sounds.   Pulmonary:      Effort: No respiratory distress.      Comments: Coarse breath sounds on ventilator  Abdominal:      General: Bowel sounds are normal. There is no distension.      Palpations: Abdomen is soft.      Tenderness: There is no abdominal tenderness.   Genitourinary:     Comments: Suprapubic catheter  Musculoskeletal:      Cervical back: Neck supple.      Comments: Some contracture of lower extremities   Skin:     Findings: No rash.      Comments: Right knee with wound VAC. Right ankle and left heel wounds dressed   Neurological:      Comments: Unable to fully evaluate, sedated on ventilator   Psychiatric:      Comments: Not communicative        Imaging  Imaging Results              CT Knee W W/O Contrast Right (Final result)  Result time 06/28/23 18:37:42      Final result by Gustavo Cassidy MD (06/28/23 18:37:42)                   Impression:      Mildly limited assessment.  5 cm area of subcutaneous fluid in the prepatellar region may have thin peripheral enhancement.  Fluid collection is possible.    Subcutaneous edema in the calf.      Electronically signed by: Gustavo Cassidy  Date:    06/28/2023  Time:    18:37               Narrative:    EXAMINATION:  CT KNEE W W/O CONTRAST RIGHT    CLINICAL HISTORY:  possible infection;    TECHNIQUE:  CT imaging of the right knee before and after IV contrast.  Axial, coronal and sagittal reformatted images reviewed.   Dose length product is 585 mGycm. Automatic exposure control, adjustment of mA/kV or iterative reconstruction technique " used to limit radiation dose.    COMPARISON:  Radiograph 06/28/2023    FINDINGS:  Assessment mildly limited by motion.  Joint alignments are maintained with degenerative changes at the knee.  Fixation hardware in the lower femur and tibia with remote proximal fibular fracture.  Areas of heterotopic ossification along the lower portion of the femur.  Small knee effusion.  Areas of subcutaneous edema anteriorly below the knee.  More focal area of fluid in the subcutaneous tissues of the prepatellar region measures up to 5 cm in transverse diameter and 5 cm in length.  There is image noise from the hardware, but this fluid may have thin areas of peripheral enhancement.  No tracking subcutaneous gas.                                       X-Ray Tibia Fibula 2 View Right (Final result)  Result time 06/28/23 18:14:06      Final result by Tracy Zamudio MD (06/28/23 18:14:06)                   Impression:      Posttraumatic and postsurgical changes at the femur and lower leg.  There is chronic deformity at the distal femur with heterogeneous sclerosis and lucency superimposed on background bony demineralization.  No old imaging of this region available for comparison.  This makes it difficult to evaluate for acute superimposed lytic change.    Postsurgical and posttraumatic change at the tibia and fibula with bony demineralization.  No appreciable acute osseous abnormality.      Electronically signed by: Tracy Zamudio  Date:    06/28/2023  Time:    18:14               Narrative:    EXAMINATION:  XR FEMUR 2 VIEW RIGHT; XR TIBIA FIBULA 2 VIEW RIGHT    CLINICAL HISTORY:  possible infection;; infection;    TECHNIQUE:  AP and lateral views of the right femur were performed.    AP and lateral views of the right tibia and fibula were obtained.    COMPARISON:  Knee radiographs 06/28/2023, tibia and fibular radiographs 08/10/2022    FINDINGS:  There are postsurgical changes of ORIF at the distal femur with lateral plate and  screws.  There are postsurgical changes of ORIF at the tibia with antegrade intramedullary raven and proximal and distal interlocking screws.  Hardware appears intact.  No acute fracture seen.  There are old, healed fracture deformities.    There is chronic remodeling at the distal femur with heterogeneous appearance of the marrow and cortex distally.  There are areas of lucency as well as sclerosis.  There is no imaging through the distal femur available for comparison to evaluate for acute lytic changes superimposed on chronic background posttraumatic and postsurgical change.  Older prior radiographs of the tibia and fibula do not adequately imaged the area.  The bones are demineralized.    There is soft tissue swelling at the knee.  There is soft tissue swelling at the ankle.                                       X-Ray Femur 2 View Right (Final result)  Result time 06/28/23 18:14:06      Final result by Tracy Zamudio MD (06/28/23 18:14:06)                   Impression:      Posttraumatic and postsurgical changes at the femur and lower leg.  There is chronic deformity at the distal femur with heterogeneous sclerosis and lucency superimposed on background bony demineralization.  No old imaging of this region available for comparison.  This makes it difficult to evaluate for acute superimposed lytic change.    Postsurgical and posttraumatic change at the tibia and fibula with bony demineralization.  No appreciable acute osseous abnormality.      Electronically signed by: Tracy Zamudio  Date:    06/28/2023  Time:    18:14               Narrative:    EXAMINATION:  XR FEMUR 2 VIEW RIGHT; XR TIBIA FIBULA 2 VIEW RIGHT    CLINICAL HISTORY:  possible infection;; infection;    TECHNIQUE:  AP and lateral views of the right femur were performed.    AP and lateral views of the right tibia and fibula were obtained.    COMPARISON:  Knee radiographs 06/28/2023, tibia and fibular radiographs 08/10/2022    FINDINGS:  There  are postsurgical changes of ORIF at the distal femur with lateral plate and screws.  There are postsurgical changes of ORIF at the tibia with antegrade intramedullary raven and proximal and distal interlocking screws.  Hardware appears intact.  No acute fracture seen.  There are old, healed fracture deformities.    There is chronic remodeling at the distal femur with heterogeneous appearance of the marrow and cortex distally.  There are areas of lucency as well as sclerosis.  There is no imaging through the distal femur available for comparison to evaluate for acute lytic changes superimposed on chronic background posttraumatic and postsurgical change.  Older prior radiographs of the tibia and fibula do not adequately imaged the area.  The bones are demineralized.    There is soft tissue swelling at the knee.  There is soft tissue swelling at the ankle.                                       X-Ray Knee 3 View Right (Final result)  Result time 06/28/23 14:18:03      Final result by Lanette Waller MD (06/28/23 14:18:03)                   Impression:      1. No acute bony abnormality.  2. Soft tissue swelling around the knee.      Electronically signed by: Lanette Waller  Date:    06/28/2023  Time:    14:18               Narrative:    EXAMINATION:  XR KNEE 3 VIEW RIGHT    CLINICAL HISTORY:  Effusion, right knee    COMPARISON:  None.    FINDINGS:  There has been prior internal fixation of the femur and tibia.  There are chronic changes of the bones with heterotopic ossification around the knee.  There is no acute fracture identified.  There is soft tissue swelling around the knee.                                       Lab Review   Recent Results (from the past 24 hour(s))   Renal Function Panel    Collection Time: 07/11/23  1:25 AM   Result Value Ref Range    Sodium Level 133 (L) 136 - 145 mmol/L    Potassium Level 4.4 3.5 - 5.1 mmol/L    Chloride 97 (L) 98 - 107 mmol/L    Carbon Dioxide 22 22 - 29 mmol/L    Glucose  Level 121 (H) 74 - 100 mg/dL    Blood Urea Nitrogen 75.8 (H) 8.4 - 25.7 mg/dL    Creatinine 4.30 (H) 0.73 - 1.18 mg/dL    Calcium Level Total 8.1 (L) 8.4 - 10.2 mg/dL    Albumin Level 2.0 (L) 3.5 - 5.0 g/dL    Phosphorus Level 6.5 (H) 2.3 - 4.7 mg/dL    eGFR 15 mls/min/1.73/m2   Blood Gas (ABG)    Collection Time: 07/11/23  6:53 AM   Result Value Ref Range    Sample Type Arterial Blood     Sample site Left Radial Artery     Drawn by sd rrt     pH, Blood gas 7.410 7.350 - 7.450    pCO2, Blood gas 50.0 (H) 35.0 - 45.0 mmHg    pO2, Blood gas 78.0 (L) 80.0 - 100.0 mmHg    Sodium, Blood Gas 131 (L) 137 - 145 mmol/L    Potassium, Blood Gas 3.6 3.5 - 5.0 mmol/L    Calcium Level Ionized 1.12 1.12 - 1.23 mmol/L    TOC2, Blood gas 33.2 mmol/L    Base Excess, Blood gas 5.90 mmol/L    sO2, Blood gas 96.0 %    HCO3, Blood gas 31.7 >=15.0 mmol/L    Allens Test Yes     MODE AC     Oxygen Device, Blood gas Ventilator     FIO2, Blood gas 40 %    Mech Vt 450 ml    Mech RR 20 b/min    PEEP 8.0 cmH2O   RT Blood Gas    Collection Time: 07/11/23 10:52 AM   Result Value Ref Range    Sample Type Arterial Blood     Sample site Left Radial Artery     Drawn by sd rrt     pH, Blood gas 7.440 7.350 - 7.450    pCO2, Blood gas 46.0 (H) 35.0 - 45.0 mmHg    pO2, Blood gas 53.0 (LL) 80.0 - 100.0 mmHg    Sodium, Blood Gas 130 (L) 137 - 145 mmol/L    Potassium, Blood Gas 3.8 3.5 - 5.0 mmol/L    Calcium Level Ionized 1.13 1.12 - 1.23 mmol/L    TOC2, Blood gas 32.6 mmol/L    Base Excess, Blood gas 6.10 mmol/L    sO2, Blood gas 88.0 %    HCO3, Blood gas 31.2 >=15.0 mmol/L    Allens Test Yes     MODE CPAP     Oxygen Device, Blood gas Ventilator     FIO2, Blood gas 40 %    CPAP 5 cm H2O    PS 10.0 cmH2O             Assessment/Plan:  1. SIRS with fevers  2. Group B Streptococcus Right knee prepatellar abscess  3. Group B Streptococcus Right knee septic arthritis  4.  Anemia/reactive thrombocytosis  5.  Paraplegia secondary to T-spine cord injury  6. History  of hepatitis-C   7. Chronic right ankle wound/osteomyelitis  8.  Diabetes    9.  Acute kidney injury  10. Acute hypoxic respiratory failure  11. Pulmonary edema with pleural effusions/ Pneumonitis      -We will place on ceftriaxone with end date of 08/09 and maintain chronic suppressive doxycycline .  Meropenem has been discontinued   -No fevers noted, with slight leukocytosis and thrombocytosis, follow  -7/4 blood cultures remain negative and sputum culture with moderate yeast.  7/8 repeat blood cultures negative so far, follow  -7/8 CXR is unchanged with persistent patchy airspace opacity with bibasilar effusions  -7/4 CT scan of the abdomen and pelvis and 7/3 chest x-ray results noted, likely dealing with pulmonary edema with pleural effusions and possibly superimposed infectious pneumonitis. We will get procalcitonin level for further evaluation  -6/28 right knee abscess culture with Group B Streptococcus  -Seen by Orthopedic surgery team calm s/p I&D of R prepatellar bursa abscess and septic R knee with cultures yielding Group G Streptococcus  -6/28 blood cultures negative  -Multiple comorbidities, paraplegic with chronic pressure wounds, right ankle osteomyelitis with exposed bone on chronic suppressive doxycycline  -Plan for a long-term antibiotic coverage, about a 6 week course for GBS septic arthritis, following inflammatory markers as well as maintaining on chronic suppressive antibiotics with doxycycline  -We will continue surgical site care per Orthopedic surgery team  -We will continue wound care  -He is to continue management of his hepatitis-C as outpatient  -Renal impairment noted, HD per Nephrology, started 7/4  -7/3 Renal ultrasound results noted  -Continue ventilator management and ICU support per intensivist  -Discussed with nursing staff.

## 2023-07-12 NOTE — NURSING
07/11/23 2133   Post-Hemodialysis Assessment   Total UF (mL) 2500 mL   Post-Hemodialysis Comments pt bp dropped to 80's  near the end of tx. at this time pt also desatting at 85%. due to instability tx ended early. 2.5 L net UF achieved. cvc did have some suction alarming noted with coughing. post tx vss.

## 2023-07-13 LAB
ALBUMIN SERPL-MCNC: 2 G/DL (ref 3.5–5)
BUN SERPL-MCNC: 56.3 MG/DL (ref 8.4–25.7)
CALCIUM SERPL-MCNC: 8.3 MG/DL (ref 8.4–10.2)
CHLORIDE SERPL-SCNC: 96 MMOL/L (ref 98–107)
CO2 SERPL-SCNC: 25 MMOL/L (ref 22–29)
CREAT SERPL-MCNC: 3.07 MG/DL (ref 0.73–1.18)
GFR SERPLBLD CREATININE-BSD FMLA CKD-EPI: 23 MLS/MIN/1.73/M2
GLUCOSE SERPL-MCNC: 150 MG/DL (ref 74–100)
PHOSPHATE SERPL-MCNC: 4.7 MG/DL (ref 2.3–4.7)
POTASSIUM SERPL-SCNC: 3.7 MMOL/L (ref 3.5–5.1)
SODIUM SERPL-SCNC: 133 MMOL/L (ref 136–145)

## 2023-07-13 PROCEDURE — 94003 VENT MGMT INPAT SUBQ DAY: CPT

## 2023-07-13 PROCEDURE — 89220 SPUTUM SPECIMEN COLLECTION: CPT

## 2023-07-13 PROCEDURE — 63600175 PHARM REV CODE 636 W HCPCS

## 2023-07-13 PROCEDURE — 63600175 PHARM REV CODE 636 W HCPCS: Performed by: INTERNAL MEDICINE

## 2023-07-13 PROCEDURE — 27200966 HC CLOSED SUCTION SYSTEM

## 2023-07-13 PROCEDURE — 63600175 PHARM REV CODE 636 W HCPCS: Performed by: STUDENT IN AN ORGANIZED HEALTH CARE EDUCATION/TRAINING PROGRAM

## 2023-07-13 PROCEDURE — 25000003 PHARM REV CODE 250

## 2023-07-13 PROCEDURE — 20000000 HC ICU ROOM

## 2023-07-13 PROCEDURE — 99900026 HC AIRWAY MAINTENANCE (STAT)

## 2023-07-13 PROCEDURE — 25000003 PHARM REV CODE 250: Performed by: INTERNAL MEDICINE

## 2023-07-13 PROCEDURE — 80069 RENAL FUNCTION PANEL: CPT | Performed by: INTERNAL MEDICINE

## 2023-07-13 PROCEDURE — 27000221 HC OXYGEN, UP TO 24 HOURS

## 2023-07-13 PROCEDURE — 25000003 PHARM REV CODE 250: Performed by: STUDENT IN AN ORGANIZED HEALTH CARE EDUCATION/TRAINING PROGRAM

## 2023-07-13 PROCEDURE — 99900035 HC TECH TIME PER 15 MIN (STAT)

## 2023-07-13 PROCEDURE — 94761 N-INVAS EAR/PLS OXIMETRY MLT: CPT

## 2023-07-13 RX ORDER — POTASSIUM CHLORIDE 14.9 MG/ML
40 INJECTION INTRAVENOUS ONCE
Status: COMPLETED | OUTPATIENT
Start: 2023-07-13 | End: 2023-07-13

## 2023-07-13 RX ORDER — OXYCODONE AND ACETAMINOPHEN 10; 325 MG/1; MG/1
1 TABLET ORAL EVERY 6 HOURS PRN
Status: DISCONTINUED | OUTPATIENT
Start: 2023-07-13 | End: 2023-07-24

## 2023-07-13 RX ADMIN — DOXYCYCLINE HYCLATE 100 MG: 100 TABLET, COATED ORAL at 08:07

## 2023-07-13 RX ADMIN — SENNOSIDES AND DOCUSATE SODIUM 2 TABLET: 50; 8.6 TABLET ORAL at 08:07

## 2023-07-13 RX ADMIN — GUAIFENESIN 400 MG: 200 SOLUTION ORAL at 05:07

## 2023-07-13 RX ADMIN — MICONAZOLE NITRATE 2 % TOPICAL POWDER: at 08:07

## 2023-07-13 RX ADMIN — CEFTRIAXONE SODIUM 2 G: 2 INJECTION, POWDER, FOR SOLUTION INTRAMUSCULAR; INTRAVENOUS at 11:07

## 2023-07-13 RX ADMIN — DEXMEDETOMIDINE HYDROCHLORIDE 1.4 MCG/KG/HR: 400 INJECTION INTRAVENOUS at 04:07

## 2023-07-13 RX ADMIN — ATORVASTATIN CALCIUM 20 MG: 10 TABLET, FILM COATED ORAL at 08:07

## 2023-07-13 RX ADMIN — DEXMEDETOMIDINE HYDROCHLORIDE 1.4 MCG/KG/HR: 400 INJECTION INTRAVENOUS at 08:07

## 2023-07-13 RX ADMIN — PROPOFOL 25 MCG/KG/MIN: 10 INJECTION, EMULSION INTRAVENOUS at 02:07

## 2023-07-13 RX ADMIN — GUAIFENESIN 400 MG: 200 SOLUTION ORAL at 10:07

## 2023-07-13 RX ADMIN — HYDRALAZINE HYDROCHLORIDE 10 MG: 20 INJECTION INTRAMUSCULAR; INTRAVENOUS at 01:07

## 2023-07-13 RX ADMIN — HYDRALAZINE HYDROCHLORIDE 10 MG: 20 INJECTION INTRAMUSCULAR; INTRAVENOUS at 12:07

## 2023-07-13 RX ADMIN — DEXMEDETOMIDINE HYDROCHLORIDE 1.4 MCG/KG/HR: 400 INJECTION INTRAVENOUS at 11:07

## 2023-07-13 RX ADMIN — CEFTRIAXONE SODIUM 2 G: 2 INJECTION, POWDER, FOR SOLUTION INTRAMUSCULAR; INTRAVENOUS at 12:07

## 2023-07-13 RX ADMIN — DEXMEDETOMIDINE HYDROCHLORIDE 1.4 MCG/KG/HR: 400 INJECTION INTRAVENOUS at 10:07

## 2023-07-13 RX ADMIN — DEXMEDETOMIDINE HYDROCHLORIDE 1.4 MCG/KG/HR: 400 INJECTION INTRAVENOUS at 12:07

## 2023-07-13 RX ADMIN — SODIUM BICARBONATE 650 MG TABLET 650 MG: at 08:07

## 2023-07-13 RX ADMIN — ACETAMINOPHEN 650 MG: 325 TABLET, FILM COATED ORAL at 01:07

## 2023-07-13 RX ADMIN — PROPOFOL 40 MCG/KG/MIN: 10 INJECTION, EMULSION INTRAVENOUS at 02:07

## 2023-07-13 RX ADMIN — FENOFIBRATE 48 MG: 48 TABLET, FILM COATED ORAL at 08:07

## 2023-07-13 RX ADMIN — OXYBUTYNIN CHLORIDE 5 MG: 5 TABLET ORAL at 08:07

## 2023-07-13 RX ADMIN — AMLODIPINE BESYLATE 5 MG: 5 TABLET ORAL at 08:07

## 2023-07-13 RX ADMIN — CARVEDILOL 6.25 MG: 3.12 TABLET, FILM COATED ORAL at 08:07

## 2023-07-13 RX ADMIN — GUAIFENESIN 400 MG: 200 SOLUTION ORAL at 01:07

## 2023-07-13 RX ADMIN — GUAIFENESIN 400 MG: 200 SOLUTION ORAL at 02:07

## 2023-07-13 RX ADMIN — FAMOTIDINE 20 MG: 10 INJECTION, SOLUTION INTRAVENOUS at 08:07

## 2023-07-13 RX ADMIN — ENOXAPARIN SODIUM 30 MG: 30 INJECTION SUBCUTANEOUS at 04:07

## 2023-07-13 RX ADMIN — OXYCODONE HYDROCHLORIDE AND ACETAMINOPHEN 1 TABLET: 10; 325 TABLET ORAL at 08:07

## 2023-07-13 RX ADMIN — POTASSIUM CHLORIDE 40 MEQ: 14.9 INJECTION, SOLUTION INTRAVENOUS at 06:07

## 2023-07-13 RX ADMIN — GUAIFENESIN 400 MG: 200 SOLUTION ORAL at 08:07

## 2023-07-13 RX ADMIN — PROPOFOL 20 MCG/KG/MIN: 10 INJECTION, EMULSION INTRAVENOUS at 08:07

## 2023-07-13 RX ADMIN — FUROSEMIDE 40 MG: 10 INJECTION, SOLUTION INTRAMUSCULAR; INTRAVENOUS at 04:07

## 2023-07-13 RX ADMIN — PROPOFOL 25 MCG/KG/MIN: 10 INJECTION, EMULSION INTRAVENOUS at 04:07

## 2023-07-13 RX ADMIN — FUROSEMIDE 40 MG: 10 INJECTION, SOLUTION INTRAMUSCULAR; INTRAVENOUS at 08:07

## 2023-07-13 RX ADMIN — FUROSEMIDE 40 MG: 10 INJECTION, SOLUTION INTRAMUSCULAR; INTRAVENOUS at 06:07

## 2023-07-13 NOTE — PROGRESS NOTES
[facility] - [facility]  Pulmonary Critical Care Note    Patient Name: Bianca Khan  MRN: [id number]  Admission Date: 6/28/2023  Hospital Length of Stay: 15 days  Code Status: Full Code  Attending Provider: Khris Treadwell Jr., MD, *  Primary Care Provider: Areli Vargas PA-C     Subjective:     HPI:   Bianca Khan is a 58 y.o. male with PMH of paraplegia 2/2 spinal cord injury (T1-T6), neurogenic bladder with suprapubic catheter, chronic right ankle osteomyelitis, DM II, HTN, who presented to the ED on 6/28/2023 with CC of right knee pain. Per chart review, CT of right knee revealed 5 cm area of subcutaneous fluid in prepatellar region which was aspirated in the ED; he was taken to the OR on 6/29/2023 by orthopedic surgery team and was found to have prepatellar bursa infection and septic arthritis. Wound culture 6/29/2023 positive for strep agalactiae. Orthopedic surgery team recommended right-sided above-knee amputation d/t chronically infected hardware in right lower extremity. On 7/4/2023, a RRT was called d/t acute respiratory distress that was not improving despite being placed on vapotherm at 35 L/min with FiO2 100%. At the time of examination, patient's initial GCS was 10 but progressively reduced to a GCS of 6. Shared decision with patient's wife was made to intubate patient for airway protection though ABGs were within normal limits. He is admitted to the ICU for close monitoring and further medical management.    Hospital Course/Significant events:  6/29/2023 - I&D of right knee  7/4/2023 - Admitted to ICU, intubated for airway protection d/t altered mental status. Trialysis catheter placed and HD began       24 Hour Interval History:  No febrile episodes past 24 hours, last episode 7/9-10.  BC x2 7/8 no growth at 96 hours.  HD done yesterday with 3550 cc ultrafiltration with 150 cc urine output.  Merrem discontinued and Rocephin started with end date of 08/09.  Doxycycline daytime  for suppressive therapy. Patient noted to be agitated, awake upon my examination though not regarding when called. On precedex 1.4, Propofol 40.       Past Medical History:   Diagnosis Date    Arthritis     Chronic ulcer of ankle 05/26/2022    Frequent UTI 07/02/2019    Generalized anxiety disorder 05/26/2022    Neurogenic bladder 05/26/2022    Osteomyelitis 05/26/2022    Presence of suprapubic catheter 05/26/2022    Pure hypercholesterolemia 05/26/2022    Retention of urine, unspecified 08/09/2019    Spinal cord injury at T1-T6 level 04/20/2018       Past Surgical History:   Procedure Laterality Date    INCISION AND DRAINAGE, LOWER EXTREMITY Right 6/29/2023    Procedure: INCISION AND DRAINAGE, LOWER EXTREMITY;  Surgeon: Prabhu Shen DO;  Location: [facility];  Service: Orthopedics;  Laterality: Right;  supine bone foam wash stuff cultures    INSERTION OF INTRAMEDULLARY MARISA Right 8/10/2022    Procedure: INSERTION, INTRAMEDULLARY MARISA RIGHT TIBIA;  Surgeon: Jorge Orellana MD;  Location: [facility];  Service: Orthopedics;  Laterality: Right;       Social History     Socioeconomic History    Marital status:    Tobacco Use    Smoking status: Every Day     Types: Cigars, Cigarettes    Smokeless tobacco: Never   Substance and Sexual Activity    Alcohol use: Yes     Alcohol/week: 2.0 standard drinks     Types: 2 Cans of beer per week    Drug use: Not Currently    Sexual activity: Never       Current Outpatient Medications   Medication Instructions    amitriptyline (ELAVIL) 50 mg, Oral, Nightly    amLODIPine (NORVASC) 10 MG tablet 1 tablet    atorvastatin (LIPITOR) 20 mg, Oral, Nightly    busPIRone (BUSPAR) 5 MG Tab 1 tablet    carvediloL (COREG) 12.5 mg, Oral    cyclobenzaprine (FLEXERIL) 10 mg, Oral, 2 times daily PRN    doxycycline (VIBRAMYCIN) 100 mg, Oral, Daily    fenofibrate 160 mg, Oral    FLUoxetine 20 mg, Oral    gabapentin (NEURONTIN) 300 MG capsule 1 capsule    lisinopriL 10 mg, Oral, Daily    LORazepam  (ATIVAN) 1 mg, Oral, 2 times daily    melatonin (MELATIN) 3 mg tablet TAKE TWO TABLETS BY MOUTH EVERY NIGHT AT BEDTIME AS NEEDED FOR INSOMNIA.    meloxicam (MOBIC) 15 mg, Oral, Daily    metFORMIN (GLUCOPHAGE) 500 MG tablet SMARTSI Tablet(s) By Mouth Morning-Evening    oxybutynin (DITROPAN) 5 mg, Oral, 2 times daily    oxyCODONE-acetaminophen (PERCOCET)  mg per tablet 1 tablet, Oral, Every 6 hours PRN    pantoprazole (PROTONIX) 40 MG tablet 1 tablet    temazepam (RESTORIL) 30 mg, Oral, Nightly    traZODone (DESYREL) 50 mg, Oral, Nightly    XARELTO 20 mg Tab SMARTSI Tablet(s) By Mouth Every Evening     Current Inpatient Medications   amLODIPine  5 mg Per NG tube Daily    atorvastatin  20 mg Per NG tube Nightly    carvediloL  6.25 mg Per NG tube BID    cefTRIAXone (ROCEPHIN) IVPB  2 g Intravenous Q24H    doxycycline  100 mg Per NG tube Q12H    enoxaparin  30 mg Subcutaneous Daily    epoetin franklin  20,000 Units Subcutaneous Every Mon, Wed, Fri    famotidine (PF)  20 mg Intravenous Daily    fenofibrate  48 mg Per NG tube Daily    furosemide (LASIX) injection  40 mg Intravenous BID    guaiFENesin 100 mg/5 ml  400 mg Per NG tube Q4H    miconazole NITRATE 2 %   Topical (Top) BID    oxybutynin  5 mg Per NG tube BID    senna-docusate 8.6-50 mg  2 tablet Per NG tube BID    sodium bicarbonate  650 mg Per NG tube BID    sodium citrate-citric acid 500-334 mg/5 ml  30 mL Oral Once     Current Intravenous Infusions   dexmedeTOMIDine (Precedex) infusion (titrating) 1.4 mcg/kg/hr (23 0419)    NORepinephrine bitartrate-D5W Stopped (23 1039)    propofoL 30 mcg/kg/min (23 0400)       Objective:     Intake/Output Summary (Last 24 hours) at 20235  Last data filed at 2023 1653  Gross per 24 hour   Intake 2884.23 ml   Output 3701 ml   Net -816.77 ml       Vital Signs (Most Recent):  Temp: 97.7 °F (36.5 °C) (23 0000)  Pulse: 63 (23 0400)  Resp: 20 (23 0400)  BP: 129/69 (23  0400)  SpO2: 98 % (07/13/23 0400)  Body mass index is 27.51 kg/m².  Weight: 86.2 kg (190 lb) Vital Signs (24h Range):  Temp:  [97.7 °F (36.5 °C)-98.8 °F (37.1 °C)] 97.7 °F (36.5 °C)  Pulse:  [57-78] 63  Resp:  [16-24] 20  SpO2:  [95 %-100 %] 98 %  BP: ()/(46-97) 129/69     Physical Exam  Vitals and nursing note reviewed.   Constitutional:       Interventions: He is sedated and intubated.   HENT:      Head: Normocephalic and atraumatic.      Nose: Nose normal.   Cardiovascular:      Rate and Rhythm: Normal rate and regular rhythm.      Pulses: Normal pulses.      Heart sounds: Normal heart sounds.   Pulmonary:      Effort: He is intubated.      Comments: Coarse lung sounds heard throughout the anterior chest.   Abdominal:      General: Bowel sounds are normal.      Palpations: Abdomen is soft.   Musculoskeletal:      Cervical back: Neck supple.   Skin:     General: Skin is warm and dry.   Neurological:      Comments: Unable to assess 2/2 patient sedation status at the time of exam   Psychiatric:      Comments: Unable to assess 2/2 patient sedation status at the time of exam       Lines/Drains/Airways       Central Venous Catheter Line  Duration                  Hemodialysis Catheter 07/04/23 0900 right internal jugular 8 days              Drain  Duration                  Suprapubic Catheter 07/06/23 1544 100% silicone 22 Fr. 6 days         NG/OG Tube 07/11/23 1425 Right nostril 1 day              Airway  Duration                  Airway - Non-Surgical 07/11/23 1420 Endotracheal Tube 1 day              Peripheral Intravenous Line  Duration                  Midline Catheter Insertion/Assessment  - Single Lumen 07/02/23 2128 Right basilic vein (medial side of arm) 10 days         Peripheral IV - Single Lumen 07/04/23 0600 18 G Anterior;Left Forearm 8 days         Peripheral IV - Single Lumen 07/04/23 0600 20 G Left;Posterior Hand 8 days                  Significant Labs:  Lab Results   Component Value Date    WBC  7.05 07/12/2023    HGB 9.3 (L) 07/12/2023    HCT 27.5 (L) 07/12/2023    MCV 73.7 (L) 07/12/2023     (H) 07/12/2023       BMP  Lab Results   Component Value Date     (L) 07/13/2023    K 3.7 07/13/2023    CHLORIDE 96 (L) 07/13/2023    CO2 25 07/13/2023    BUN 56.3 (H) 07/13/2023    CREATININE 3.07 (H) 07/13/2023    CALCIUM 8.3 (L) 07/13/2023    AGAP 9.0 08/29/2022    EGFRNONAA >60 04/27/2022     ABG  Recent Labs   Lab 07/11/23  1052   PH 7.440   PO2 53.0*   HCO3 31.2       Mechanical Ventilation Support:  Vent Mode: VOLUME A/C (07/13/23 0235)  Ventilator Initiated: Yes (07/11/23 1420)  Set Rate: 20 BPM (07/13/23 0235)  Vt Set: 450 mL (07/13/23 0235)  Pressure Support: 10 cmH20 (07/12/23 0400)  PEEP/CPAP: 8 cmH20 (07/13/23 0235)  Oxygen Concentration (%): 40 (07/13/23 0400)  Peak Airway Pressure: 22 cmH20 (07/13/23 0235)  Total Ve: 7 L/m (07/13/23 0235)  F/VT Ratio<105 (RSBI): (!) 58.14 (07/13/23 0235)    Significant Imaging:  I have reviewed the pertinent imaging within the past 24 hours.    Assessment/Plan:     Assessment  Extensive bilateral pulmonary infiltrates  DX:  Infectious pneumonia versus ARDS versus hydrostatic/non hydrostatic pulmonary edema   Sputum culture showed moderate yeast  Intubated 07/04/2023  Prepatellar bursitis/septic arthritis of the right knee (strep agalactiae)   Post I&D on 06/29/2023   Ortho no longer planning for right above-the-knee amputation in near future due to infectious suppression  Acute renal failure, oliguric  Initiation of hemodialysis on 07/04/2023  Acute encephalopathy, likely metabolic related to above, currently clouded by sedation   Reported paroxysmal atrial fibrillation with permenant pacemaker, currently in sinus rhythm   H/o chronic paraplegia at T1 2/2 spinal cordy injury, neurogenic bladder, chronic right ankle osteomyelitis, DM2, HTN    Plan  -Continue ICU level of care for ongoing monitoring and medical management  -ID, urology, orthopedics, wound care  teams following, appreciate their assistance   -HD/Ultrafiltration as per nephrology, 2.5L taken off yesterday  -per ID  Merrem discontinued and switched to ceftriaxone with end date of 08/09 and continuing suppressive doxycycline with further plan of 6 week course for GBS septic arthritis and following inflammatory markers; repeat cultures (7/9/23) NG@ 96hr  -titrate mechanical ventilation for ARDS net protocol.  -Will attempt spontaneous breathing trial again today with further improvement of volume status       DVT Prophylaxis: LVX 30   GI Prophylaxis: Pepcid 20     32 minutes of critical care was time spent personally by me on the following activities: development of treatment plan with patient or surrogate and bedside caregivers, discussions with consultants, evaluation of patient's response to treatment, examination of patient, ordering and performing treatments and interventions, ordering and review of laboratory studies, ordering and review of radiographic studies, pulse oximetry, re-evaluation of patient's condition.  This critical care time did not overlap with that of any other provider or involve time for any procedures.    Guido Rivas MD  Pulmonary Critical Care Medicine  [facility] - [facility] ICU

## 2023-07-13 NOTE — NURSING
Nurses Note -- 4 Eyes      7/13/2023   4:33 AM      Skin assessed during: Daily Assessment      [] No Altered Skin Integrity Present    [x]Prevention Measures Documented      [x] Yes- Altered Skin Integrity Present or Discovered   [] LDA Added if Not in Epic (Describe Wound)   [] New Altered Skin Integrity was Present on Admit and Documented in LDA   [] Wound Image Taken    Wound Care Consulted? Yes    Attending Nurse:  Lazaro Kilgore RN     Second RN/Staff Member:  Neena Lai, ICU Tech

## 2023-07-13 NOTE — PROGRESS NOTES
Infectious Diseases Progress Note  58-year-old male with past medical history of T-spine cord injury with paraplegia, diabetes, HTN, hepatitis-C, chronic right ankle wound/osteomyelitis, was on suppressive doxycycline, known to my service, seen by us initially at [facility] and has followed with us at the office for chronic osteomyelitis, is admitted to [facility] on 06/28/2023 presenting through the ED with complaints of pain and swelling to his right knee, apparently struck his knee.  He did also report prior remote right knee surgery.  He was evaluated and noted to have no fevers initially but a temperature of 100.2° today 6/29, no leukocytosis but with thrombocytosis of up to 531 today.  .2 and ESR >130.  He is anemic with low albumin.  Blood cultures have been negative.  CT scan of the right knee noted a 5 cm area of subcutaneous fluid collection in the prepatellar region.  There was aspiration in the ER with findings of purulent fluid and cultures showing group B Streptococcus.  He was seen by the orthopedic surgery team with findings of right prepatellar bursa abscess and is status post incision and drainage of right knee septic arthritis and right prepatellar bursa abscess today 6/29 with group B Streptococcus    Subjective:  No new complaints, no fevers, doing about the same.  Lying in bed in no acute distress      Past Medical History:   Diagnosis Date    Arthritis     Chronic ulcer of ankle 05/26/2022    Frequent UTI 07/02/2019    Generalized anxiety disorder 05/26/2022    Neurogenic bladder 05/26/2022    Osteomyelitis 05/26/2022    Presence of suprapubic catheter 05/26/2022    Pure hypercholesterolemia 05/26/2022    Retention of urine, unspecified 08/09/2019    Spinal cord injury at T1-T6 level 04/20/2018     Past Surgical History:   Procedure Laterality Date    INCISION AND DRAINAGE, LOWER EXTREMITY Right 6/29/2023    Procedure: INCISION AND DRAINAGE, LOWER  EXTREMITY;  Surgeon: Prabhu Shen DO;  Location: [facility] OR;  Service: Orthopedics;  Laterality: Right;  supine bone foam wash stuff cultures    INSERTION OF INTRAMEDULLARY MARISA Right 8/10/2022    Procedure: INSERTION, INTRAMEDULLARY MARISA RIGHT TIBIA;  Surgeon: Jorge Orellana MD;  Location: [facility];  Service: Orthopedics;  Laterality: Right;     Social History     Socioeconomic History    Marital status:    Tobacco Use    Smoking status: Every Day     Types: Cigars, Cigarettes    Smokeless tobacco: Never   Substance and Sexual Activity    Alcohol use: Yes     Alcohol/week: 2.0 standard drinks     Types: 2 Cans of beer per week    Drug use: Not Currently    Sexual activity: Never       Review of Systems   Unable to perform ROS: Intubated       Review of patient's allergies indicates:   Allergen Reactions    Baclofen Itching and Anxiety         Scheduled Meds:   [START ON 7/13/2023] amLODIPine  5 mg Per NG tube Daily    atorvastatin  20 mg Per NG tube Nightly    carvediloL  6.25 mg Per NG tube BID    cefTRIAXone (ROCEPHIN) IVPB  2 g Intravenous Q24H    doxycycline  100 mg Per NG tube Q12H    enoxaparin  30 mg Subcutaneous Daily    epoetin franklin  20,000 Units Subcutaneous Every Mon, Wed, Fri    famotidine (PF)  20 mg Intravenous Daily    [START ON 7/13/2023] fenofibrate  48 mg Per NG tube Daily    furosemide (LASIX) injection  40 mg Intravenous BID    guaiFENesin 100 mg/5 ml  400 mg Per NG tube Q4H    miconazole NITRATE 2 %   Topical (Top) BID    oxybutynin  5 mg Per NG tube BID    senna-docusate 8.6-50 mg  2 tablet Per NG tube BID    sodium bicarbonate  650 mg Per NG tube BID    sodium citrate-citric acid 500-334 mg/5 ml  30 mL Oral Once     Continuous Infusions:   dexmedeTOMIDine (Precedex) infusion (titrating) 1.4 mcg/kg/hr (07/12/23 1758)    NORepinephrine bitartrate-D5W Stopped (07/12/23 1039)    propofoL 30 mcg/kg/min (07/12/23 1758)     PRN Meds:sodium chloride, acetaminophen, hydrALAZINE, labetalol, sodium  "chloride 0.9%, sodium chloride 0.9%    Objective:  BP (!) 161/88 Comment: pt coughing & agitated, will reassess in a hour, position adjusted and reorientation provided  Pulse 60   Temp 97.7 °F (36.5 °C) (Axillary)   Resp 20   Ht 5' 9.69" (1.77 m)   Wt 86.2 kg (190 lb)   SpO2 98%   BMI 27.51 kg/m²     Physical Exam:   Physical Exam  Vitals reviewed.   Constitutional:       General: He is not in acute distress.     Appearance: He is ill-appearing.   HENT:      Head: Normocephalic and atraumatic.      Mouth/Throat:      Comments: ET tube in place  Cardiovascular:      Rate and Rhythm: Normal rate and regular rhythm.      Heart sounds: Normal heart sounds.   Pulmonary:      Effort: No respiratory distress.      Comments: Coarse breath sounds on ventilator  Abdominal:      General: Bowel sounds are normal. There is no distension.      Palpations: Abdomen is soft.      Tenderness: There is no abdominal tenderness.   Genitourinary:     Comments: Suprapubic catheter  Musculoskeletal:      Cervical back: Neck supple.      Comments: Some contracture of lower extremities   Skin:     Findings: No rash.      Comments: Right knee with wound VAC. Right ankle and left heel wounds dressed   Neurological:      Comments: Unable to fully evaluate, sedated on ventilator   Psychiatric:      Comments: Not communicative      Imaging  Imaging Results              CT Knee W W/O Contrast Right (Final result)  Result time 06/28/23 18:37:42      Final result by Gustavo Cassidy MD (06/28/23 18:37:42)                   Impression:      Mildly limited assessment.  5 cm area of subcutaneous fluid in the prepatellar region may have thin peripheral enhancement.  Fluid collection is possible.    Subcutaneous edema in the calf.      Electronically signed by: Gustavo Cassidy  Date:    06/28/2023  Time:    18:37               Narrative:    EXAMINATION:  CT KNEE W W/O CONTRAST RIGHT    CLINICAL HISTORY:  possible infection;    TECHNIQUE:  CT imaging " of the right knee before and after IV contrast.  Axial, coronal and sagittal reformatted images reviewed.   Dose length product is 585 mGycm. Automatic exposure control, adjustment of mA/kV or iterative reconstruction technique used to limit radiation dose.    COMPARISON:  Radiograph 06/28/2023    FINDINGS:  Assessment mildly limited by motion.  Joint alignments are maintained with degenerative changes at the knee.  Fixation hardware in the lower femur and tibia with remote proximal fibular fracture.  Areas of heterotopic ossification along the lower portion of the femur.  Small knee effusion.  Areas of subcutaneous edema anteriorly below the knee.  More focal area of fluid in the subcutaneous tissues of the prepatellar region measures up to 5 cm in transverse diameter and 5 cm in length.  There is image noise from the hardware, but this fluid may have thin areas of peripheral enhancement.  No tracking subcutaneous gas.                                       X-Ray Tibia Fibula 2 View Right (Final result)  Result time 06/28/23 18:14:06      Final result by Tracy Zamudio MD (06/28/23 18:14:06)                   Impression:      Posttraumatic and postsurgical changes at the femur and lower leg.  There is chronic deformity at the distal femur with heterogeneous sclerosis and lucency superimposed on background bony demineralization.  No old imaging of this region available for comparison.  This makes it difficult to evaluate for acute superimposed lytic change.    Postsurgical and posttraumatic change at the tibia and fibula with bony demineralization.  No appreciable acute osseous abnormality.      Electronically signed by: Tracy Zamudio  Date:    06/28/2023  Time:    18:14               Narrative:    EXAMINATION:  XR FEMUR 2 VIEW RIGHT; XR TIBIA FIBULA 2 VIEW RIGHT    CLINICAL HISTORY:  possible infection;; infection;    TECHNIQUE:  AP and lateral views of the right femur were performed.    AP and lateral  views of the right tibia and fibula were obtained.    COMPARISON:  Knee radiographs 06/28/2023, tibia and fibular radiographs 08/10/2022    FINDINGS:  There are postsurgical changes of ORIF at the distal femur with lateral plate and screws.  There are postsurgical changes of ORIF at the tibia with antegrade intramedullary raven and proximal and distal interlocking screws.  Hardware appears intact.  No acute fracture seen.  There are old, healed fracture deformities.    There is chronic remodeling at the distal femur with heterogeneous appearance of the marrow and cortex distally.  There are areas of lucency as well as sclerosis.  There is no imaging through the distal femur available for comparison to evaluate for acute lytic changes superimposed on chronic background posttraumatic and postsurgical change.  Older prior radiographs of the tibia and fibula do not adequately imaged the area.  The bones are demineralized.    There is soft tissue swelling at the knee.  There is soft tissue swelling at the ankle.                                       X-Ray Femur 2 View Right (Final result)  Result time 06/28/23 18:14:06      Final result by Tracy Zamudio MD (06/28/23 18:14:06)                   Impression:      Posttraumatic and postsurgical changes at the femur and lower leg.  There is chronic deformity at the distal femur with heterogeneous sclerosis and lucency superimposed on background bony demineralization.  No old imaging of this region available for comparison.  This makes it difficult to evaluate for acute superimposed lytic change.    Postsurgical and posttraumatic change at the tibia and fibula with bony demineralization.  No appreciable acute osseous abnormality.      Electronically signed by: Tracy Zamudio  Date:    06/28/2023  Time:    18:14               Narrative:    EXAMINATION:  XR FEMUR 2 VIEW RIGHT; XR TIBIA FIBULA 2 VIEW RIGHT    CLINICAL HISTORY:  possible infection;;  infection;    TECHNIQUE:  AP and lateral views of the right femur were performed.    AP and lateral views of the right tibia and fibula were obtained.    COMPARISON:  Knee radiographs 06/28/2023, tibia and fibular radiographs 08/10/2022    FINDINGS:  There are postsurgical changes of ORIF at the distal femur with lateral plate and screws.  There are postsurgical changes of ORIF at the tibia with antegrade intramedullary raven and proximal and distal interlocking screws.  Hardware appears intact.  No acute fracture seen.  There are old, healed fracture deformities.    There is chronic remodeling at the distal femur with heterogeneous appearance of the marrow and cortex distally.  There are areas of lucency as well as sclerosis.  There is no imaging through the distal femur available for comparison to evaluate for acute lytic changes superimposed on chronic background posttraumatic and postsurgical change.  Older prior radiographs of the tibia and fibula do not adequately imaged the area.  The bones are demineralized.    There is soft tissue swelling at the knee.  There is soft tissue swelling at the ankle.                                       X-Ray Knee 3 View Right (Final result)  Result time 06/28/23 14:18:03      Final result by Lanette Waller MD (06/28/23 14:18:03)                   Impression:      1. No acute bony abnormality.  2. Soft tissue swelling around the knee.      Electronically signed by: Lanette Waller  Date:    06/28/2023  Time:    14:18               Narrative:    EXAMINATION:  XR KNEE 3 VIEW RIGHT    CLINICAL HISTORY:  Effusion, right knee    COMPARISON:  None.    FINDINGS:  There has been prior internal fixation of the femur and tibia.  There are chronic changes of the bones with heterotopic ossification around the knee.  There is no acute fracture identified.  There is soft tissue swelling around the knee.                                       Lab Review   Recent Results (from the past 24  hour(s))   Comprehensive Metabolic Panel    Collection Time: 07/12/23  1:25 AM   Result Value Ref Range    Sodium Level 135 (L) 136 - 145 mmol/L    Potassium Level 4.1 3.5 - 5.1 mmol/L    Chloride 96 (L) 98 - 107 mmol/L    Carbon Dioxide 25 22 - 29 mmol/L    Glucose Level 118 (H) 74 - 100 mg/dL    Blood Urea Nitrogen 64.2 (H) 8.4 - 25.7 mg/dL    Creatinine 3.45 (H) 0.73 - 1.18 mg/dL    Calcium Level Total 8.3 (L) 8.4 - 10.2 mg/dL    Protein Total 6.4 6.4 - 8.3 gm/dL    Albumin Level 2.0 (L) 3.5 - 5.0 g/dL    Globulin 4.4 (H) 2.4 - 3.5 gm/dL    Albumin/Globulin Ratio 0.5 (L) 1.1 - 2.0 ratio    Bilirubin Total 0.4 <=1.5 mg/dL    Alkaline Phosphatase 82 40 - 150 unit/L    Alanine Aminotransferase 8 0 - 55 unit/L    Aspartate Aminotransferase 23 5 - 34 unit/L    eGFR 20 mls/min/1.73/m2   Magnesium    Collection Time: 07/12/23  1:25 AM   Result Value Ref Range    Magnesium Level 2.00 1.60 - 2.60 mg/dL   BNP    Collection Time: 07/12/23  1:25 AM   Result Value Ref Range    Natriuretic Peptide 1,060.2 (H) <=100.0 pg/mL   CBC with Differential    Collection Time: 07/12/23  1:25 AM   Result Value Ref Range    WBC 7.05 4.50 - 11.50 x10(3)/mcL    RBC 3.73 (L) 4.70 - 6.10 x10(6)/mcL    Hgb 9.3 (L) 14.0 - 18.0 g/dL    Hct 27.5 (L) 42.0 - 52.0 %    MCV 73.7 (L) 80.0 - 94.0 fL    MCH 24.9 (L) 27.0 - 31.0 pg    MCHC 33.8 33.0 - 36.0 g/dL    RDW 19.1 (H) 11.5 - 17.0 %    Platelet 414 (H) 130 - 400 x10(3)/mcL    MPV 10.1 7.4 - 10.4 fL    Neut % 61.4 %    Lymph % 25.7 %    Mono % 10.6 %    Eos % 0.7 %    Basophil % 0.7 %    Lymph # 1.81 0.6 - 4.6 x10(3)/mcL    Neut # 4.33 2.1 - 9.2 x10(3)/mcL    Mono # 0.75 0.1 - 1.3 x10(3)/mcL    Eos # 0.05 0 - 0.9 x10(3)/mcL    Baso # 0.05 <=0.2 x10(3)/mcL    IG# 0.06 (H) 0 - 0.04 x10(3)/mcL    IG% 0.9 %    NRBC% 0.0 %             Assessment/Plan:  1. SIRS with fevers  2. Group B Streptococcus Right knee prepatellar abscess  3. Group B Streptococcus Right knee septic arthritis  4.  Anemia/reactive  thrombocytosis  5.  Paraplegia secondary to T-spine cord injury  6. History of hepatitis-C   7. Chronic right ankle wound/osteomyelitis  8.  Diabetes    9.  Acute kidney injury  10. Acute hypoxic respiratory failure  11. Pulmonary edema with pleural effusions/ Pneumonitis      -We will continue ceftriaxone with end date of 08/09 and maintain chronic suppressive doxycycline, following inflammatory markers  -No fevers noted, leukocytosis resolved and thrombocytosis trending down, follow  -7/4 and and 7/8 blood cultures remain negative and sputum culture with moderate yeast.    -7/11 CXR results noted  -7/4 CT scan of the abdomen and pelvis and 7/3 chest x-ray results noted, likely dealing with pulmonary edema with pleural effusions and possibly superimposed infectious pneumonitis. We will get procalcitonin level for further evaluation  -6/28 right knee abscess culture with Group B Streptococcus  -Seen by Orthopedic surgery team calm s/p I&D of R prepatellar bursa abscess and septic R knee with cultures yielding Group G Streptococcus  -6/28 blood cultures negative  -Multiple comorbidities, paraplegic with chronic pressure wounds, right ankle osteomyelitis with exposed bone on chronic suppressive doxycycline  -We will continue surgical site care per Orthopedic surgery team  -We will continue wound care  -He is to continue management of his hepatitis-C as outpatient  -Renal impairment noted, HD per Nephrology, started 7/4  -7/3 Renal ultrasound results noted  -Continue ventilator management and ICU support per intensivist  -Discussed with nursing staff.

## 2023-07-14 LAB
ALBUMIN SERPL-MCNC: 1.9 G/DL (ref 3.5–5)
ALLENS TEST BLOOD GAS (OHS): YES
BACTERIA BLD CULT: NORMAL
BACTERIA BLD CULT: NORMAL
BASE EXCESS BLD CALC-SCNC: 4.1 MMOL/L
BASOPHILS # BLD AUTO: 0.04 X10(3)/MCL
BASOPHILS NFR BLD AUTO: 0.4 %
BLOOD GAS SAMPLE TYPE (OHS): ABNORMAL
BNP BLD-MCNC: 309.3 PG/ML
BUN SERPL-MCNC: 104.2 MG/DL (ref 8.4–25.7)
CA-I BLD-SCNC: 1.15 MMOL/L (ref 1.12–1.23)
CALCIUM SERPL-MCNC: 8.7 MG/DL (ref 8.4–10.2)
CHLORIDE SERPL-SCNC: 96 MMOL/L (ref 98–107)
CO2 BLDA-SCNC: 29.8 MMOL/L
CO2 SERPL-SCNC: 22 MMOL/L (ref 22–29)
CORTIS SERPL-SCNC: 6.9 UG/DL
CREAT SERPL-MCNC: 4.09 MG/DL (ref 0.73–1.18)
DRAWN BY BLOOD GAS (OHS): ABNORMAL
EOSINOPHIL # BLD AUTO: 0.09 X10(3)/MCL (ref 0–0.9)
EOSINOPHIL NFR BLD AUTO: 0.8 %
ERYTHROCYTE [DISTWIDTH] IN BLOOD BY AUTOMATED COUNT: 20 % (ref 11.5–17)
FIO2 BLOOD GAS (OHS): 40 %
GFR SERPLBLD CREATININE-BSD FMLA CKD-EPI: 16 MLS/MIN/1.73/M2
GLUCOSE SERPL-MCNC: 118 MG/DL (ref 74–100)
HCO3 BLDA-SCNC: 28.5 MMOL/L
HCT VFR BLD AUTO: 29 % (ref 42–52)
HGB BLD-MCNC: 9.6 G/DL (ref 14–18)
IMM GRANULOCYTES # BLD AUTO: 0.08 X10(3)/MCL (ref 0–0.04)
IMM GRANULOCYTES NFR BLD AUTO: 0.7 %
LYMPHOCYTES # BLD AUTO: 1.99 X10(3)/MCL (ref 0.6–4.6)
LYMPHOCYTES NFR BLD AUTO: 17.5 %
MCH RBC QN AUTO: 25.1 PG (ref 27–31)
MCHC RBC AUTO-ENTMCNC: 33.1 G/DL (ref 33–36)
MCV RBC AUTO: 75.7 FL (ref 80–94)
MECH RR (OHS): 20 B/MIN
MECH VT (OHS): 450 ML
MODE (OHS): AC
MONOCYTES # BLD AUTO: 1.6 X10(3)/MCL (ref 0.1–1.3)
MONOCYTES NFR BLD AUTO: 14.1 %
NEUTROPHILS # BLD AUTO: 7.58 X10(3)/MCL (ref 2.1–9.2)
NEUTROPHILS NFR BLD AUTO: 66.5 %
NRBC BLD AUTO-RTO: 0 %
PCO2 BLDA: 41 MMHG (ref 35–45)
PEEP (OHS): 5 CMH2O
PH BLDA: 7.45 [PH] (ref 7.35–7.45)
PHOSPHATE SERPL-MCNC: 5.9 MG/DL (ref 2.3–4.7)
PLATELET # BLD AUTO: 473 X10(3)/MCL (ref 130–400)
PMV BLD AUTO: 10.3 FL (ref 7.4–10.4)
PO2 BLDA: 125 MMHG (ref 80–100)
POTASSIUM BLOOD GAS (OHS): 3.1 MMOL/L (ref 3.5–5)
POTASSIUM SERPL-SCNC: 3.8 MMOL/L (ref 3.5–5.1)
RBC # BLD AUTO: 3.83 X10(6)/MCL (ref 4.7–6.1)
SAMPLE SITE BLOOD GAS (OHS): ABNORMAL
SAO2 % BLDA: 99 %
SODIUM BLOOD GAS (OHS): 131 MMOL/L (ref 137–145)
SODIUM SERPL-SCNC: 132 MMOL/L (ref 136–145)
TSH SERPL-ACNC: 1.43 UIU/ML (ref 0.35–4.94)
WBC # SPEC AUTO: 11.38 X10(3)/MCL (ref 4.5–11.5)

## 2023-07-14 PROCEDURE — 25000003 PHARM REV CODE 250

## 2023-07-14 PROCEDURE — 63600175 PHARM REV CODE 636 W HCPCS

## 2023-07-14 PROCEDURE — 83880 ASSAY OF NATRIURETIC PEPTIDE: CPT | Performed by: INTERNAL MEDICINE

## 2023-07-14 PROCEDURE — 25000003 PHARM REV CODE 250: Performed by: INTERNAL MEDICINE

## 2023-07-14 PROCEDURE — 63600175 PHARM REV CODE 636 W HCPCS: Performed by: INTERNAL MEDICINE

## 2023-07-14 PROCEDURE — 63600175 PHARM REV CODE 636 W HCPCS: Performed by: STUDENT IN AN ORGANIZED HEALTH CARE EDUCATION/TRAINING PROGRAM

## 2023-07-14 PROCEDURE — 99900035 HC TECH TIME PER 15 MIN (STAT)

## 2023-07-14 PROCEDURE — 99900026 HC AIRWAY MAINTENANCE (STAT)

## 2023-07-14 PROCEDURE — 82533 TOTAL CORTISOL: CPT | Performed by: INTERNAL MEDICINE

## 2023-07-14 PROCEDURE — 85025 COMPLETE CBC W/AUTO DIFF WBC: CPT

## 2023-07-14 PROCEDURE — 27200966 HC CLOSED SUCTION SYSTEM

## 2023-07-14 PROCEDURE — 94003 VENT MGMT INPAT SUBQ DAY: CPT

## 2023-07-14 PROCEDURE — 27000221 HC OXYGEN, UP TO 24 HOURS

## 2023-07-14 PROCEDURE — 84443 ASSAY THYROID STIM HORMONE: CPT | Performed by: INTERNAL MEDICINE

## 2023-07-14 PROCEDURE — 20000000 HC ICU ROOM

## 2023-07-14 PROCEDURE — 80069 RENAL FUNCTION PANEL: CPT | Performed by: INTERNAL MEDICINE

## 2023-07-14 PROCEDURE — 63600175 PHARM REV CODE 636 W HCPCS: Mod: JZ,JG | Performed by: INTERNAL MEDICINE

## 2023-07-14 PROCEDURE — 25000003 PHARM REV CODE 250: Performed by: STUDENT IN AN ORGANIZED HEALTH CARE EDUCATION/TRAINING PROGRAM

## 2023-07-14 PROCEDURE — 36600 WITHDRAWAL OF ARTERIAL BLOOD: CPT

## 2023-07-14 PROCEDURE — 94761 N-INVAS EAR/PLS OXIMETRY MLT: CPT

## 2023-07-14 PROCEDURE — 97605 NEG PRS WND THER DME<=50SQCM: CPT

## 2023-07-14 PROCEDURE — 82803 BLOOD GASES ANY COMBINATION: CPT

## 2023-07-14 PROCEDURE — 80100014 HC HEMODIALYSIS 1:1

## 2023-07-14 PROCEDURE — 63600175 PHARM REV CODE 636 W HCPCS: Mod: JZ,EC,JG | Performed by: INTERNAL MEDICINE

## 2023-07-14 RX ORDER — FUROSEMIDE 10 MG/ML
20 INJECTION INTRAMUSCULAR; INTRAVENOUS 2 TIMES DAILY
Status: DISCONTINUED | OUTPATIENT
Start: 2023-07-14 | End: 2023-07-15

## 2023-07-14 RX ORDER — FENTANYL CITRATE 50 UG/ML
100 INJECTION, SOLUTION INTRAMUSCULAR; INTRAVENOUS ONCE
Status: COMPLETED | OUTPATIENT
Start: 2023-07-14 | End: 2023-07-14

## 2023-07-14 RX ORDER — AMLODIPINE BESYLATE 5 MG/1
10 TABLET ORAL DAILY
Status: DISCONTINUED | OUTPATIENT
Start: 2023-07-15 | End: 2023-07-15

## 2023-07-14 RX ORDER — CARVEDILOL 12.5 MG/1
12.5 TABLET ORAL 2 TIMES DAILY
Status: DISCONTINUED | OUTPATIENT
Start: 2023-07-14 | End: 2023-07-18

## 2023-07-14 RX ORDER — FENTANYL CITRATE 50 UG/ML
INJECTION, SOLUTION INTRAMUSCULAR; INTRAVENOUS
Status: DISPENSED
Start: 2023-07-14 | End: 2023-07-15

## 2023-07-14 RX ADMIN — DEXMEDETOMIDINE HYDROCHLORIDE 1.4 MCG/KG/HR: 400 INJECTION INTRAVENOUS at 01:07

## 2023-07-14 RX ADMIN — DEXMEDETOMIDINE HYDROCHLORIDE 1.4 MCG/KG/HR: 400 INJECTION INTRAVENOUS at 07:07

## 2023-07-14 RX ADMIN — OXYCODONE HYDROCHLORIDE AND ACETAMINOPHEN 1 TABLET: 10; 325 TABLET ORAL at 06:07

## 2023-07-14 RX ADMIN — ERYTHROPOIETIN 20000 UNITS: 10000 INJECTION, SOLUTION INTRAVENOUS; SUBCUTANEOUS at 12:07

## 2023-07-14 RX ADMIN — DEXMEDETOMIDINE HYDROCHLORIDE 1.4 MCG/KG/HR: 400 INJECTION INTRAVENOUS at 12:07

## 2023-07-14 RX ADMIN — GUAIFENESIN 400 MG: 200 SOLUTION ORAL at 04:07

## 2023-07-14 RX ADMIN — SODIUM BICARBONATE 650 MG TABLET 650 MG: at 08:07

## 2023-07-14 RX ADMIN — OXYBUTYNIN CHLORIDE 5 MG: 5 TABLET ORAL at 08:07

## 2023-07-14 RX ADMIN — DEXMEDETOMIDINE HYDROCHLORIDE 1.4 MCG/KG/HR: 400 INJECTION INTRAVENOUS at 10:07

## 2023-07-14 RX ADMIN — PROPOFOL 50 MCG/KG/MIN: 10 INJECTION, EMULSION INTRAVENOUS at 10:07

## 2023-07-14 RX ADMIN — GUAIFENESIN 400 MG: 200 SOLUTION ORAL at 08:07

## 2023-07-14 RX ADMIN — GUAIFENESIN 400 MG: 200 SOLUTION ORAL at 01:07

## 2023-07-14 RX ADMIN — CEFTRIAXONE SODIUM 2 G: 2 INJECTION, POWDER, FOR SOLUTION INTRAMUSCULAR; INTRAVENOUS at 11:07

## 2023-07-14 RX ADMIN — AMLODIPINE BESYLATE 5 MG: 5 TABLET ORAL at 08:07

## 2023-07-14 RX ADMIN — FUROSEMIDE 40 MG: 10 INJECTION, SOLUTION INTRAMUSCULAR; INTRAVENOUS at 08:07

## 2023-07-14 RX ADMIN — PROPOFOL 25 MCG/KG/MIN: 10 INJECTION, EMULSION INTRAVENOUS at 04:07

## 2023-07-14 RX ADMIN — CARVEDILOL 6.25 MG: 3.12 TABLET, FILM COATED ORAL at 08:07

## 2023-07-14 RX ADMIN — FAMOTIDINE 20 MG: 10 INJECTION, SOLUTION INTRAVENOUS at 08:07

## 2023-07-14 RX ADMIN — METHYLNALTREXONE BROMIDE 6.4 MG: 12 INJECTION, SOLUTION SUBCUTANEOUS at 08:07

## 2023-07-14 RX ADMIN — DEXMEDETOMIDINE HYDROCHLORIDE 1.4 MCG/KG/HR: 400 INJECTION INTRAVENOUS at 04:07

## 2023-07-14 RX ADMIN — OXYCODONE HYDROCHLORIDE AND ACETAMINOPHEN 1 TABLET: 10; 325 TABLET ORAL at 07:07

## 2023-07-14 RX ADMIN — DOXYCYCLINE HYCLATE 100 MG: 100 TABLET, COATED ORAL at 08:07

## 2023-07-14 RX ADMIN — FUROSEMIDE 20 MG: 10 INJECTION, SOLUTION INTRAMUSCULAR; INTRAVENOUS at 06:07

## 2023-07-14 RX ADMIN — SENNOSIDES AND DOCUSATE SODIUM 2 TABLET: 50; 8.6 TABLET ORAL at 08:07

## 2023-07-14 RX ADMIN — PROPOFOL 30 MCG/KG/MIN: 10 INJECTION, EMULSION INTRAVENOUS at 04:07

## 2023-07-14 RX ADMIN — FENTANYL CITRATE 100 MCG: 50 INJECTION, SOLUTION INTRAMUSCULAR; INTRAVENOUS at 01:07

## 2023-07-14 RX ADMIN — CARVEDILOL 12.5 MG: 12.5 TABLET, FILM COATED ORAL at 08:07

## 2023-07-14 RX ADMIN — GUAIFENESIN 400 MG: 200 SOLUTION ORAL at 06:07

## 2023-07-14 RX ADMIN — GUAIFENESIN 400 MG: 200 SOLUTION ORAL at 10:07

## 2023-07-14 RX ADMIN — ATORVASTATIN CALCIUM 20 MG: 10 TABLET, FILM COATED ORAL at 08:07

## 2023-07-14 RX ADMIN — PROPOFOL 40 MCG/KG/MIN: 10 INJECTION, EMULSION INTRAVENOUS at 01:07

## 2023-07-14 RX ADMIN — ENOXAPARIN SODIUM 30 MG: 30 INJECTION SUBCUTANEOUS at 04:07

## 2023-07-14 RX ADMIN — ALTEPLASE 4 MG: 2.2 INJECTION, POWDER, LYOPHILIZED, FOR SOLUTION INTRAVENOUS at 09:07

## 2023-07-14 RX ADMIN — HYDRALAZINE HYDROCHLORIDE 10 MG: 20 INJECTION INTRAMUSCULAR; INTRAVENOUS at 07:07

## 2023-07-14 RX ADMIN — MICONAZOLE NITRATE 2 % TOPICAL POWDER: at 08:07

## 2023-07-14 RX ADMIN — HYDRALAZINE HYDROCHLORIDE 10 MG: 20 INJECTION INTRAMUSCULAR; INTRAVENOUS at 06:07

## 2023-07-14 RX ADMIN — LABETALOL HYDROCHLORIDE 10 MG: 5 INJECTION, SOLUTION INTRAVENOUS at 02:07

## 2023-07-14 RX ADMIN — FENOFIBRATE 48 MG: 48 TABLET, FILM COATED ORAL at 08:07

## 2023-07-14 RX ADMIN — PROPOFOL 20 MCG/KG/MIN: 10 INJECTION, EMULSION INTRAVENOUS at 07:07

## 2023-07-14 NOTE — PROGRESS NOTES
[facility] - [facility]  Pulmonary Critical Care Note    Patient Name: Bianca Khan  MRN: [id number]  Admission Date: 6/28/2023  Hospital Length of Stay: 16 days  Code Status: Full Code  Attending Provider: Khris Treadwell Jr., MD, *  Primary Care Provider: Areli Vargas PA-C     Subjective:     HPI:   Bianca Khan is a 58 y.o. male with PMH of paraplegia 2/2 spinal cord injury (T1-T6), neurogenic bladder with suprapubic catheter, chronic right ankle osteomyelitis, DM II, HTN, who presented to the ED on 6/28/2023 with CC of right knee pain. Per chart review, CT of right knee revealed 5 cm area of subcutaneous fluid in prepatellar region which was aspirated in the ED; he was taken to the OR on 6/29/2023 by orthopedic surgery team and was found to have prepatellar bursa infection and septic arthritis. Wound culture 6/29/2023 positive for strep agalactiae. Orthopedic surgery team recommended right-sided above-knee amputation d/t chronically infected hardware in right lower extremity. On 7/4/2023, a RRT was called d/t acute respiratory distress that was not improving despite being placed on vapotherm at 35 L/min with FiO2 100%. At the time of examination, patient's initial GCS was 10 but progressively reduced to a GCS of 6. Shared decision with patient's wife was made to intubate patient for airway protection though ABGs were within normal limits. He is admitted to the ICU for close monitoring and further medical management.    Hospital Course/Significant events:  6/29/2023 - I&D of right knee  7/4/2023 - Admitted to ICU, intubated for airway protection d/t altered mental status. Trialysis catheter placed and HD began       24 Hour Interval History:  No febrile episodes past 24 hours, last episode 7/9-10.  BC NGTD.  1650 cc urine output.  Rocephin day 2 to continue until 08/09.  Doxycycline for suppressive therapy. On precedex 1.4, Propofol 40. May undergo SBT today. Was complaining of pain earlier  on 25 of propofol. May have some component of Anxiety with Bipap. Per nursing report knee swelling is larger, and constipation with hx of opioids. Wound Vac in place, possible wound change today.    Past Medical History:   Diagnosis Date    Arthritis     Chronic ulcer of ankle 05/26/2022    Frequent UTI 07/02/2019    Generalized anxiety disorder 05/26/2022    Neurogenic bladder 05/26/2022    Osteomyelitis 05/26/2022    Presence of suprapubic catheter 05/26/2022    Pure hypercholesterolemia 05/26/2022    Retention of urine, unspecified 08/09/2019    Spinal cord injury at T1-T6 level 04/20/2018       Past Surgical History:   Procedure Laterality Date    INCISION AND DRAINAGE, LOWER EXTREMITY Right 6/29/2023    Procedure: INCISION AND DRAINAGE, LOWER EXTREMITY;  Surgeon: Prabhu Shen DO;  Location: [facility];  Service: Orthopedics;  Laterality: Right;  supine bone foam wash stuff cultures    INSERTION OF INTRAMEDULLARY MARISA Right 8/10/2022    Procedure: INSERTION, INTRAMEDULLARY MARISA RIGHT TIBIA;  Surgeon: Jorge Orellana MD;  Location: [facility];  Service: Orthopedics;  Laterality: Right;       Social History     Socioeconomic History    Marital status:    Tobacco Use    Smoking status: Every Day     Types: Cigars, Cigarettes    Smokeless tobacco: Never   Substance and Sexual Activity    Alcohol use: Yes     Alcohol/week: 2.0 standard drinks     Types: 2 Cans of beer per week    Drug use: Not Currently    Sexual activity: Never       Current Outpatient Medications   Medication Instructions    amitriptyline (ELAVIL) 50 mg, Oral, Nightly    amLODIPine (NORVASC) 10 MG tablet 1 tablet    atorvastatin (LIPITOR) 20 mg, Oral, Nightly    busPIRone (BUSPAR) 5 MG Tab 1 tablet    carvediloL (COREG) 12.5 mg, Oral    cyclobenzaprine (FLEXERIL) 10 mg, Oral, 2 times daily PRN    doxycycline (VIBRAMYCIN) 100 mg, Oral, Daily    fenofibrate 160 mg, Oral    FLUoxetine 20 mg, Oral    gabapentin (NEURONTIN) 300 MG capsule 1 capsule     lisinopriL 10 mg, Oral, Daily    LORazepam (ATIVAN) 1 mg, Oral, 2 times daily    melatonin (MELATIN) 3 mg tablet TAKE TWO TABLETS BY MOUTH EVERY NIGHT AT BEDTIME AS NEEDED FOR INSOMNIA.    meloxicam (MOBIC) 15 mg, Oral, Daily    metFORMIN (GLUCOPHAGE) 500 MG tablet SMARTSI Tablet(s) By Mouth Morning-Evening    oxybutynin (DITROPAN) 5 mg, Oral, 2 times daily    oxyCODONE-acetaminophen (PERCOCET)  mg per tablet 1 tablet, Oral, Every 6 hours PRN    pantoprazole (PROTONIX) 40 MG tablet 1 tablet    temazepam (RESTORIL) 30 mg, Oral, Nightly    traZODone (DESYREL) 50 mg, Oral, Nightly    XARELTO 20 mg Tab SMARTSI Tablet(s) By Mouth Every Evening     Current Inpatient Medications   amLODIPine  5 mg Per NG tube Daily    atorvastatin  20 mg Per NG tube Nightly    carvediloL  6.25 mg Per NG tube BID    cefTRIAXone (ROCEPHIN) IVPB  2 g Intravenous Q24H    doxycycline  100 mg Per NG tube Q12H    enoxaparin  30 mg Subcutaneous Daily    epoetin franklin  20,000 Units Subcutaneous Every Mon, Wed, Fri    famotidine (PF)  20 mg Intravenous Daily    fenofibrate  48 mg Per NG tube Daily    furosemide (LASIX) injection  40 mg Intravenous BID    guaiFENesin 100 mg/5 ml  400 mg Per NG tube Q4H    miconazole NITRATE 2 %   Topical (Top) BID    oxybutynin  5 mg Per NG tube BID    senna-docusate 8.6-50 mg  2 tablet Per NG tube BID    sodium bicarbonate  650 mg Per NG tube BID    sodium citrate-citric acid 500-334 mg/5 ml  30 mL Oral Once     Current Intravenous Infusions   dexmedeTOMIDine (Precedex) infusion (titrating) 1.4 mcg/kg/hr (23 045)    NORepinephrine bitartrate-D5W Stopped (23 1039)    propofoL 30 mcg/kg/min (23 045)       Objective:     Intake/Output Summary (Last 24 hours) at 2023 0516  Last data filed at 2023 0219  Gross per 24 hour   Intake 2509.9 ml   Output 1900 ml   Net 609.9 ml       Vital Signs (Most Recent):  Temp: 97.5 °F (36.4 °C) (23)  Pulse: 61 (23)  Resp: 20  (07/14/23 0400)  BP: 133/70 (07/14/23 0400)  SpO2: (!) 93 % (07/14/23 0400)  Body mass index is 27.51 kg/m².  Weight: 86.2 kg (190 lb) Vital Signs (24h Range):  Temp:  [97.5 °F (36.4 °C)-99.5 °F (37.5 °C)] 97.5 °F (36.4 °C)  Pulse:  [61-99] 61  Resp:  [19-26] 20  SpO2:  [91 %-99 %] 93 %  BP: (110-161)/(59-87) 133/70     Physical Exam  Vitals and nursing note reviewed.   Constitutional:       Interventions: He is sedated and intubated.   HENT:      Head: Normocephalic and atraumatic.      Nose: Nose normal.   Cardiovascular:      Rate and Rhythm: Normal rate and regular rhythm.      Pulses: Normal pulses.      Heart sounds: Normal heart sounds.   Pulmonary:      Effort: He is intubated.      Comments: Coarse lung sounds heard throughout the anterior chest.   Abdominal:      General: Bowel sounds are normal.      Palpations: Abdomen is soft.   Musculoskeletal:      Cervical back: Neck supple.   Skin:     General: Skin is warm and dry.   Neurological:      Comments: Unable to assess 2/2 patient sedation status at the time of exam   Psychiatric:      Comments: Unable to assess 2/2 patient sedation status at the time of exam       Lines/Drains/Airways       Central Venous Catheter Line  Duration                  Hemodialysis Catheter 07/04/23 0900 right internal jugular 9 days              Drain  Duration                  Suprapubic Catheter 07/06/23 1544 100% silicone 22 Fr. 7 days         NG/OG Tube 07/11/23 1425 Right nostril 2 days              Airway  Duration                  Airway - Non-Surgical 07/11/23 1420 Endotracheal Tube 2 days              Peripheral Intravenous Line  Duration                  Midline Catheter Insertion/Assessment  - Single Lumen 07/02/23 2128 Right basilic vein (medial side of arm) 11 days         Peripheral IV - Single Lumen 07/04/23 0600 18 G Anterior;Left Forearm 9 days         Peripheral IV - Single Lumen 07/04/23 0600 20 G Left;Posterior Hand 9 days                  Significant  Labs:  Lab Results   Component Value Date    WBC 7.05 07/12/2023    HGB 9.3 (L) 07/12/2023    HCT 27.5 (L) 07/12/2023    MCV 73.7 (L) 07/12/2023     (H) 07/12/2023       BMP  Lab Results   Component Value Date     (L) 07/14/2023    K 3.8 07/14/2023    CHLORIDE 96 (L) 07/14/2023    CO2 22 07/14/2023    .2 (H) 07/14/2023    CREATININE 4.09 (H) 07/14/2023    CALCIUM 8.7 07/14/2023    AGAP 9.0 08/29/2022    EGFRNONAA >60 04/27/2022     ABG  Recent Labs   Lab 07/11/23  1052   PH 7.440   PO2 53.0*   HCO3 31.2       Mechanical Ventilation Support:  Vent Mode: VOLUME A/C (07/14/23 0235)  Ventilator Initiated: Yes (07/11/23 1420)  Set Rate: 20 BPM (07/14/23 0235)  Vt Set: 450 mL (07/14/23 0235)  Pressure Support: 10 cmH20 (07/12/23 0400)  PEEP/CPAP: 5 cmH20 (07/14/23 0235)  Oxygen Concentration (%): 30 (07/14/23 0400)  Peak Airway Pressure: 22 cmH20 (07/14/23 0235)  Total Ve: 7.3 L/m (07/14/23 0235)  F/VT Ratio<105 (RSBI): (!) 60.17 (07/14/23 0235)    Significant Imaging:  I have reviewed the pertinent imaging within the past 24 hours.    Assessment/Plan:     Assessment  Extensive bilateral pulmonary infiltrates  DX:  Infectious pneumonia versus ARDS versus hydrostatic/non hydrostatic pulmonary edema   Sputum culture showed moderate yeast  Intubated 07/04/2023  Prepatellar bursitis/septic arthritis of the right knee (strep agalactiae)   Post I&D on 06/29/2023   Ortho no longer planning for right above-the-knee amputation in near future due to infectious suppression  Acute renal failure, oliguric  Initiation of hemodialysis on 07/04/2023  Acute encephalopathy, likely metabolic related to above, currently clouded by sedation   Reported paroxysmal atrial fibrillation with permenant pacemaker, currently in sinus rhythm   H/o chronic paraplegia at T1 2/2 spinal cordy injury, neurogenic bladder, chronic right ankle osteomyelitis, DM2, HTN    Plan  -Continue ICU level of care for ongoing monitoring and medical  management  -ID, urology, orthopedics, wound care teams following, appreciate their assistance   -HD/Ultrafiltration as per nephrology, 2.5L taken off yesterday  -per ID Merrem discontinued and switched to ceftriaxone with end date of 08/09 and continuing suppressive doxycycline with further plan of 6 week course for GBS septic arthritis and following inflammatory markers; repeat cultures (7/9/23) NGTD.  -titrate mechanical ventilation for ARDS net protocol.  -Will trial methylnaltrexone for constipation, failed lactulose, only small amount of output over course of past few days with belly distension  -Pending Spontaneous Breathing Trial       DVT Prophylaxis: LVX 30   GI Prophylaxis: Pepcid 20     32 minutes of critical care was time spent personally by me on the following activities: development of treatment plan with patient or surrogate and bedside caregivers, discussions with consultants, evaluation of patient's response to treatment, examination of patient, ordering and performing treatments and interventions, ordering and review of laboratory studies, ordering and review of radiographic studies, pulse oximetry, re-evaluation of patient's condition.  This critical care time did not overlap with that of any other provider or involve time for any procedures.    uGido Rivas MD  Pulmonary Critical Care Medicine  [facility] - [facility]

## 2023-07-14 NOTE — NURSING
07/14/23 1800        Hemodialysis Catheter 07/14/23 1329 left internal jugular   Placement Date/Time: 07/14/23 1329   Hand Hygiene: Performed  Barrier Precautions: Performed  Skin Antisepsis: ChloraPrep  Hemodialysis Catheter Type: Temporary catheter  Location: left internal jugular  Patient Tolerance: Insertion: tolerated well  P...   Line Necessity Review CRRT/HD   Verification by X-ray Yes   Site Assessment No drainage   Dressing Type CHG impregnated dressing/sponge   Dressing Status Clean;Dry;Intact   Dressing Intervention Integrity maintained   Date on Dressing 07/14/23   Post-Hemodialysis Assessment   Rinseback Volume (mL) 250 mL   Blood Volume Processed (Liters) 65.1 L   Dialyzer Clearance Heavily streaked   Duration of Treatment 225 minutes   Total UF (mL) 3500 mL   Net Fluid Removal 3000   Patient Response to Treatment Tolerated well   Post-Hemodialysis Comments Blood rinsed back per P&P. Lines capped and secured.

## 2023-07-14 NOTE — PROGRESS NOTES
Infectious Diseases Progress Note  58-year-old male with past medical history of T-spine cord injury with paraplegia, diabetes, HTN, hepatitis-C, chronic right ankle wound/osteomyelitis, was on suppressive doxycycline, known to my service, seen by us initially at [facility] and has followed with us at the office for chronic osteomyelitis, is admitted to [facility] on 06/28/2023 presenting through the ED with complaints of pain and swelling to his right knee, apparently struck his knee.  He did also report prior remote right knee surgery.  He was evaluated and noted to have no fevers initially but a temperature of 100.2° today 6/29, no leukocytosis but with thrombocytosis of up to 531 today.  .2 and ESR >130.  He is anemic with low albumin.  Blood cultures have been negative.  CT scan of the right knee noted a 5 cm area of subcutaneous fluid collection in the prepatellar region.  There was aspiration in the ER with findings of purulent fluid and cultures showing group B Streptococcus.  He was seen by the orthopedic surgery team with findings of right prepatellar bursa abscess and is status post incision and drainage of right knee septic arthritis and right prepatellar bursa abscess today 6/29 with group B Streptococcus    Subjective:  No new complaints, low-grade temp noted, doing about the same.  Remains in the ICU on ventilator, in no acute distress      Past Medical History:   Diagnosis Date    Arthritis     Chronic ulcer of ankle 05/26/2022    Frequent UTI 07/02/2019    Generalized anxiety disorder 05/26/2022    Neurogenic bladder 05/26/2022    Osteomyelitis 05/26/2022    Presence of suprapubic catheter 05/26/2022    Pure hypercholesterolemia 05/26/2022    Retention of urine, unspecified 08/09/2019    Spinal cord injury at T1-T6 level 04/20/2018     Past Surgical History:   Procedure Laterality Date    INCISION AND DRAINAGE, LOWER EXTREMITY Right 6/29/2023     Procedure: INCISION AND DRAINAGE, LOWER EXTREMITY;  Surgeon: Prabhu Shen DO;  Location: [facility] OR;  Service: Orthopedics;  Laterality: Right;  supine bone foam wash stuff cultures    INSERTION OF INTRAMEDULLARY MARISA Right 8/10/2022    Procedure: INSERTION, INTRAMEDULLARY MARISA RIGHT TIBIA;  Surgeon: Jorge Orellana MD;  Location: [facility] OR;  Service: Orthopedics;  Laterality: Right;     Social History     Socioeconomic History    Marital status:    Tobacco Use    Smoking status: Every Day     Types: Cigars, Cigarettes    Smokeless tobacco: Never   Substance and Sexual Activity    Alcohol use: Yes     Alcohol/week: 2.0 standard drinks     Types: 2 Cans of beer per week    Drug use: Not Currently    Sexual activity: Never       Review of Systems   Unable to perform ROS: Intubated       Review of patient's allergies indicates:   Allergen Reactions    Baclofen Itching and Anxiety         Scheduled Meds:   amLODIPine  5 mg Per NG tube Daily    atorvastatin  20 mg Per NG tube Nightly    carvediloL  6.25 mg Per NG tube BID    cefTRIAXone (ROCEPHIN) IVPB  2 g Intravenous Q24H    doxycycline  100 mg Per NG tube Q12H    enoxaparin  30 mg Subcutaneous Daily    epoetin franklin  20,000 Units Subcutaneous Every Mon, Wed, Fri    famotidine (PF)  20 mg Intravenous Daily    fenofibrate  48 mg Per NG tube Daily    furosemide (LASIX) injection  40 mg Intravenous BID    guaiFENesin 100 mg/5 ml  400 mg Per NG tube Q4H    miconazole NITRATE 2 %   Topical (Top) BID    oxybutynin  5 mg Per NG tube BID    senna-docusate 8.6-50 mg  2 tablet Per NG tube BID    sodium bicarbonate  650 mg Per NG tube BID    sodium citrate-citric acid 500-334 mg/5 ml  30 mL Oral Once     Continuous Infusions:   dexmedeTOMIDine (Precedex) infusion (titrating) 1.4 mcg/kg/hr (07/13/23 1653)    NORepinephrine bitartrate-D5W Stopped (07/12/23 1039)    propofoL 25 mcg/kg/min (07/13/23 1658)     PRN Meds:sodium chloride, acetaminophen, hydrALAZINE, labetalol,  "oxyCODONE-acetaminophen, sodium chloride 0.9%, sodium chloride 0.9%    Objective:  BP (!) 142/75 (BP Location: Left arm, Patient Position: Lying)   Pulse 88   Temp 98.5 °F (36.9 °C) (Oral)   Resp (!) 25   Ht 5' 9.69" (1.77 m)   Wt 86.2 kg (190 lb)   SpO2 96%   BMI 27.51 kg/m²     Physical Exam:   Physical Exam  Vitals reviewed.   Constitutional:       General: He is not in acute distress.     Appearance: He is ill-appearing.   HENT:      Head: Normocephalic and atraumatic.      Mouth/Throat:      Comments: ET tube in place  Cardiovascular:      Rate and Rhythm: Normal rate and regular rhythm.      Heart sounds: Normal heart sounds.   Pulmonary:      Effort: No respiratory distress.      Comments: Coarse breath sounds on ventilator  Abdominal:      General: Bowel sounds are normal. There is no distension.      Palpations: Abdomen is soft.      Tenderness: There is no abdominal tenderness.   Genitourinary:     Comments: Suprapubic catheter  Musculoskeletal:      Cervical back: Neck supple.      Comments: Some contracture of lower extremities   Skin:     Findings: No rash.      Comments: Right knee with wound VAC. Right ankle and left heel wounds dressed   Neurological:      Comments: Unable to fully evaluate, sedated on ventilator   Psychiatric:      Comments: Not communicative     Imaging  Imaging Results              CT Knee W W/O Contrast Right (Final result)  Result time 06/28/23 18:37:42      Final result by Gustavo Cassidy MD (06/28/23 18:37:42)                   Impression:      Mildly limited assessment.  5 cm area of subcutaneous fluid in the prepatellar region may have thin peripheral enhancement.  Fluid collection is possible.    Subcutaneous edema in the calf.      Electronically signed by: Gustavo Cassidy  Date:    06/28/2023  Time:    18:37               Narrative:    EXAMINATION:  CT KNEE W W/O CONTRAST RIGHT    CLINICAL HISTORY:  possible infection;    TECHNIQUE:  CT imaging of the right knee " before and after IV contrast.  Axial, coronal and sagittal reformatted images reviewed.   Dose length product is 585 mGycm. Automatic exposure control, adjustment of mA/kV or iterative reconstruction technique used to limit radiation dose.    COMPARISON:  Radiograph 06/28/2023    FINDINGS:  Assessment mildly limited by motion.  Joint alignments are maintained with degenerative changes at the knee.  Fixation hardware in the lower femur and tibia with remote proximal fibular fracture.  Areas of heterotopic ossification along the lower portion of the femur.  Small knee effusion.  Areas of subcutaneous edema anteriorly below the knee.  More focal area of fluid in the subcutaneous tissues of the prepatellar region measures up to 5 cm in transverse diameter and 5 cm in length.  There is image noise from the hardware, but this fluid may have thin areas of peripheral enhancement.  No tracking subcutaneous gas.                                       X-Ray Tibia Fibula 2 View Right (Final result)  Result time 06/28/23 18:14:06      Final result by Tracy Zamudio MD (06/28/23 18:14:06)                   Impression:      Posttraumatic and postsurgical changes at the femur and lower leg.  There is chronic deformity at the distal femur with heterogeneous sclerosis and lucency superimposed on background bony demineralization.  No old imaging of this region available for comparison.  This makes it difficult to evaluate for acute superimposed lytic change.    Postsurgical and posttraumatic change at the tibia and fibula with bony demineralization.  No appreciable acute osseous abnormality.      Electronically signed by: Tracy Zamudio  Date:    06/28/2023  Time:    18:14               Narrative:    EXAMINATION:  XR FEMUR 2 VIEW RIGHT; XR TIBIA FIBULA 2 VIEW RIGHT    CLINICAL HISTORY:  possible infection;; infection;    TECHNIQUE:  AP and lateral views of the right femur were performed.    AP and lateral views of the right  tibia and fibula were obtained.    COMPARISON:  Knee radiographs 06/28/2023, tibia and fibular radiographs 08/10/2022    FINDINGS:  There are postsurgical changes of ORIF at the distal femur with lateral plate and screws.  There are postsurgical changes of ORIF at the tibia with antegrade intramedullary raven and proximal and distal interlocking screws.  Hardware appears intact.  No acute fracture seen.  There are old, healed fracture deformities.    There is chronic remodeling at the distal femur with heterogeneous appearance of the marrow and cortex distally.  There are areas of lucency as well as sclerosis.  There is no imaging through the distal femur available for comparison to evaluate for acute lytic changes superimposed on chronic background posttraumatic and postsurgical change.  Older prior radiographs of the tibia and fibula do not adequately imaged the area.  The bones are demineralized.    There is soft tissue swelling at the knee.  There is soft tissue swelling at the ankle.                                       X-Ray Femur 2 View Right (Final result)  Result time 06/28/23 18:14:06      Final result by Tracy Zamudio MD (06/28/23 18:14:06)                   Impression:      Posttraumatic and postsurgical changes at the femur and lower leg.  There is chronic deformity at the distal femur with heterogeneous sclerosis and lucency superimposed on background bony demineralization.  No old imaging of this region available for comparison.  This makes it difficult to evaluate for acute superimposed lytic change.    Postsurgical and posttraumatic change at the tibia and fibula with bony demineralization.  No appreciable acute osseous abnormality.      Electronically signed by: Tracy Zamudio  Date:    06/28/2023  Time:    18:14               Narrative:    EXAMINATION:  XR FEMUR 2 VIEW RIGHT; XR TIBIA FIBULA 2 VIEW RIGHT    CLINICAL HISTORY:  possible infection;; infection;    TECHNIQUE:  AP and lateral  views of the right femur were performed.    AP and lateral views of the right tibia and fibula were obtained.    COMPARISON:  Knee radiographs 06/28/2023, tibia and fibular radiographs 08/10/2022    FINDINGS:  There are postsurgical changes of ORIF at the distal femur with lateral plate and screws.  There are postsurgical changes of ORIF at the tibia with antegrade intramedullary raven and proximal and distal interlocking screws.  Hardware appears intact.  No acute fracture seen.  There are old, healed fracture deformities.    There is chronic remodeling at the distal femur with heterogeneous appearance of the marrow and cortex distally.  There are areas of lucency as well as sclerosis.  There is no imaging through the distal femur available for comparison to evaluate for acute lytic changes superimposed on chronic background posttraumatic and postsurgical change.  Older prior radiographs of the tibia and fibula do not adequately imaged the area.  The bones are demineralized.    There is soft tissue swelling at the knee.  There is soft tissue swelling at the ankle.                                       X-Ray Knee 3 View Right (Final result)  Result time 06/28/23 14:18:03      Final result by Lanette Waller MD (06/28/23 14:18:03)                   Impression:      1. No acute bony abnormality.  2. Soft tissue swelling around the knee.      Electronically signed by: Lanette Waller  Date:    06/28/2023  Time:    14:18               Narrative:    EXAMINATION:  XR KNEE 3 VIEW RIGHT    CLINICAL HISTORY:  Effusion, right knee    COMPARISON:  None.    FINDINGS:  There has been prior internal fixation of the femur and tibia.  There are chronic changes of the bones with heterotopic ossification around the knee.  There is no acute fracture identified.  There is soft tissue swelling around the knee.                                       Lab Review   Recent Results (from the past 24 hour(s))   Renal Function Panel     Collection Time: 07/13/23  1:35 AM   Result Value Ref Range    Sodium Level 133 (L) 136 - 145 mmol/L    Potassium Level 3.7 3.5 - 5.1 mmol/L    Chloride 96 (L) 98 - 107 mmol/L    Carbon Dioxide 25 22 - 29 mmol/L    Glucose Level 150 (H) 74 - 100 mg/dL    Blood Urea Nitrogen 56.3 (H) 8.4 - 25.7 mg/dL    Creatinine 3.07 (H) 0.73 - 1.18 mg/dL    Calcium Level Total 8.3 (L) 8.4 - 10.2 mg/dL    Albumin Level 2.0 (L) 3.5 - 5.0 g/dL    Phosphorus Level 4.7 2.3 - 4.7 mg/dL    eGFR 23 mls/min/1.73/m2             Assessment/Plan:  1. SIRS with fevers  2. Group B Streptococcus Right knee prepatellar abscess  3. Group B Streptococcus Right knee septic arthritis  4.  Anemia/reactive thrombocytosis  5.  Paraplegia secondary to T-spine cord injury  6. History of hepatitis-C   7. Chronic right ankle wound/osteomyelitis  8.  Diabetes    9.  Acute kidney injury  10. Acute hypoxic respiratory failure  11. Pulmonary edema with pleural effusions/ Pneumonitis      -Continue ceftriaxone with end date of 08/09 and maintain chronic suppressive doxycycline, following inflammatory markers  -low-grade temp noted, leukocytosis resolved and thrombocytosis about the same, follow  -7/4 and and 7/8 blood cultures remain negative and sputum culture with moderate yeast.    -7/11 CXR results noted  -7/4 CT scan of the abdomen and pelvis and 7/3 chest x-ray results noted, likely dealing with pulmonary edema with pleural effusions and possibly superimposed infectious pneumonitis. We will get procalcitonin level for further evaluation  -6/28 right knee abscess culture with Group B Streptococcus  -Seen by Orthopedic surgery team calm s/p I&D of R prepatellar bursa abscess and septic R knee with cultures yielding Group G Streptococcus  -6/28 blood cultures negative  -Multiple comorbidities, paraplegic with chronic pressure wounds, right ankle osteomyelitis with exposed bone on chronic suppressive doxycycline  -We will continue surgical site care per  Orthopedic surgery team  -We will continue wound care  -He is to continue management of his hepatitis-C as outpatient  -Renal impairment noted, HD per Nephrology, started 7/4  -7/3 Renal ultrasound results noted  -Continue ventilator management and ICU support per intensivist  -Discussed with nursing staff.

## 2023-07-14 NOTE — NURSING
07/14/23 1007   Post-Hemodialysis Assessment   Rinseback Volume (mL) 500 mL   Blood Volume Processed (Liters) 18.2 L   Dialyzer Clearance Lightly streaked   Duration of Treatment 27 minutes   Additional Fluid Intake (mL) 0 mL   Total UF (mL) 317 mL   Net Fluid Removal 0   Patient Response to Treatment pt cath not working when put on machine. Art pressure high throughout tx. Dr gore called, gave VO to try Cathflo. Given at 0909. Tried again at 0953 and cath still not working. Rn notified, will consult Sx for new cath. New caps applied.

## 2023-07-14 NOTE — NURSING
Nurses Note -- 4 Eyes      7/13/2023   11:00 PM      Skin assessed during: Daily Assessment      [] No Altered Skin Integrity Present    [x]Prevention Measures Documented      [x] Yes- Altered Skin Integrity Present or Discovered   [] LDA Added if Not in Epic (Describe Wound)   [] New Altered Skin Integrity was Present on Admit and Documented in LDA   [] Wound Image Taken    Wound Care Consulted? Yes    Attending Nurse:  Starr Sommers RN     Second RN/Staff Member:  Maxim Caballero RN

## 2023-07-14 NOTE — PROGRESS NOTES
Inpatient Nutrition Assessment    Admit Date: 6/28/2023   Total duration of encounter: 16 days     Nutrition Recommendation/Prescription     Tube feeding recommendation:   Novasource Renal goal rate 35 ml/hr + 4 packets ProSource NoCarb daily to provide  1640 kcal/d (96% est needs, 111% with meds)  123 g protein/d (95% est needs)  504 ml free water/d  (calculations based on estimated 20 hr/d run time)     Also add Malachi (provides 90 kcal, 2.5 g protein per serving) BID.    Communication of Recommendations: reviewed with nurse    Nutrition Assessment     Malnutrition Assessment/Nutrition-Focused Physical Exam    Malnutrition Context: chronic illness  Malnutrition Level:  (does not meet criteria)  Energy Intake (Malnutrition):  (unable to obtain)  Weight Loss (Malnutrition):  (unable to obtain)  Subcutaneous Fat (Malnutrition):  (does not meet criteria)           Muscle Mass (Malnutrition):  (does not meet criteria)                          Fluid Accumulation (Malnutrition):  (does not meet criteria)        A minimum of two characteristics is recommended for diagnosis of either severe or non-severe malnutrition.    Chart Review    Reason Seen: continuous nutrition monitoring and follow-up    Malnutrition Screening Tool Results   Have you recently lost weight without trying?: Unsure  Have you been eating poorly because of a decreased appetite?: Yes   MST Score: 3     Diagnosis:  Bilateral Pulmonary Infiltrates   Uremic encephalopathy 2/2 acute renal failure  Acute renal failure  Hyponatremia  Septic arthritis    Relevant Medical History: paraplegia 2/2 spinal cord injury (T1-T6), neurogenic bladder with suprapubic catheter, chronic right ankle osteomyelitis, DM II, HTN    Nutrition-Related Medications:   furosemide, senna-docusate, diprivan    Calorie Containing IV Medications: Diprivan @ 10 ml/hr (provides 260 kcal/d)    Nutrition-Related Labs:  7/5 Na 128, K 5.3, Glu 93, BUN 39.8, Crea 7.07, Phos 7.1, GFR 8  7/11 Na  "133, BUN 75.8, Crea 4.3, Glu 121, Phos 6.5  7/14 .2, Crea 4.09, Glu 118, Phos 5.9    Diet/PN Order: No diet orders on file  Oral Supplement Order: none  Tube Feeding Order:  Novasource Renal @ 35ml/hr (see below for calculation)  Appetite/Oral Intake: not applicable/not applicable  Factors Affecting Nutritional Intake: on mechanical ventilation  Food/Christianity/Cultural Preferences: unable to obtain  Food Allergies: none reported    Skin Integrity: wound, drain/device(s)  Wound(s):      Altered Skin Integrity 06/28/23 2230 medial Sacral spine #1-Tissue loss description: Partial thickness       Altered Skin Integrity 06/28/23 2230 Left posterior Buttocks #4-Tissue loss description: Partial thickness       Altered Skin Integrity 06/28/23 2230 Right lateral Ankle #6-Tissue loss description: Partial thickness     Comments    7/5/23: Discussed with RN. Will provide tube feeding recommendations for when appropriate to start tube feeding. Receiving kcal from meds. Noted K and Phos, will need renal formula at this time. Will add supplemental protein and Malachi for wound healing.     7/11/23: TF continues, tolerated @ goal rate.     7/14/23: TF continues, tolerated per RN.     Anthropometrics    Height: 5' 9.69" (177 cm) Height Method: Stated  Last Weight: 86.2 kg (190 lb) (07/04/23 1114) Weight Method: Standard Scale (stated)  BMI (Calculated): 27.5  BMI Classification: overweight (BMI 25-29.9)        Ideal Body Weight (IBW), Male: 164.14 lb     % Ideal Body Weight, Male (lb): 115.75 %                          Usual Weight Provided By: unable to obtain usual weight    Wt Readings from Last 5 Encounters:   07/04/23 86.2 kg (190 lb)   09/13/22 86.2 kg (190 lb)   08/25/22 86.2 kg (190 lb 0.6 oz)   01/04/22 86.2 kg (189 lb 15.9 oz)     Weight Change(s) Since Admission:  Admit Weight: 86.2 kg (190 lb) (06/28/23 1346)  7/14/23: no new    Estimated Needs    Weight Used For Calorie Calculations: 86.2 kg (190 lb 0.6 oz)  Energy " Calorie Requirements (kcal): 1714kcal  Energy Need Method: [facility]  Weight Used For Protein Calculations: 86.2 kg (190 lb 0.6 oz)  Protein Requirements: 130gm (1.5g/kg)  Fluid Requirements (mL): 1000ml + urinary output  Temp (24hrs), Av.3 °F (36.8 °C), Min:97.4 °F (36.3 °C), Max:99.5 °F (37.5 °C)    Vtot (L/Min) for [place] Equation Calculation: 7.1    Enteral Nutrition    Formula: Novasource Renal  Rate/Volume: 35ml/hr  Water Flushes: 50ml q4hr  Additives/Modulars: Prosource No Carb and Malachi  Route: orogastric tube  Method: continuous  Total Nutrition Provided by Tube Feeding, Additives, and Flushes:  Calories Provided  1640 kcal/d, 96% needs   Protein Provided  123 g/d, 95% needs   Fluid Provided  504 ml/d, N/A% needs   Continuous feeding calculations based on estimated 20 hr/d run time unless otherwise stated.    Parenteral Nutrition    Patient not receiving parenteral nutrition support at this time.    Evaluation of Received Nutrient Intake    Calories: meeting estimated needs  Protein: meeting estimated needs    Patient Education    Not applicable.    Nutrition Diagnosis     PES: Inadequate oral intake related to acute illness as evidenced by intubation since 23. (continues)    Interventions/Goals     Intervention(s): modified composition of enteral nutrition, modified rate of enteral nutrition, and collaboration with other providers  Goal: Meet greater than 75% of nutritional needs by follow-up. (goal progressing)    Monitoring & Evaluation     Dietitian will monitor energy intake.  Nutrition Risk/Follow-Up: high (follow-up in 1-4 days)   Please consult if re-assessment needed sooner.

## 2023-07-14 NOTE — PROGRESS NOTES
Progress Note  Nephrology    Admit Date: 6/28/2023   LOS: 16 days     SUBJECTIVE:     Follow-up For:  ANTON / ATN    Interval History:  59 Y/O male with history of paraplegia with neurogenic bladder and suprapubic cath with frequent infection and Right knee infection admitted for antibiotics   Also had high BUN and CR   Pt had respiratory failure and currently intubated and on vent   On hemodialysis now   earlier the temporary dialysis cath was malfunctioning and it was changed   Tolerating dialysis well     Review of Systems:  Constitutional: No fever or chills  Respiratory: No cough or shortness of breath  Cardiovascular: No chest pain or palpitations  Gastrointestinal: No nausea or vomiting  Neurological: No confusion or weakness    OBJECTIVE:     Vital Signs Range (Last 24H):  Vitals:    07/14/23 1600   BP: (!) 142/80   Pulse: 67   Resp: (!) 22   Temp: 96.4 °F (35.8 °C)       Temp:  [96.4 °F (35.8 °C)-98.8 °F (37.1 °C)]   Pulse:  [61-97]   Resp:  [16-28]   BP: (110-177)/(64-98)   SpO2:  [90 %-99 %]     I & O (Last 24H):  Intake/Output Summary (Last 24 hours) at 7/14/2023 1610  Last data filed at 7/14/2023 1603  Gross per 24 hour   Intake 2324.29 ml   Output 2792 ml   Net -467.71 ml       Physical Exam:  Intubated and on vent   Head  normal   Chest   symmetric   Lungs   clear   Heart   RRR  Abdomen   soft , non tender   Ext   no edema    Laboratory Data:    Recent Labs   Lab 07/14/23  0111   *   K 3.8   CO2 22   .2*   CREATININE 4.09*   GLUCOSE 118*   CALCIUM 8.7   PHOS 5.9*     Lab Results   Component Value Date    .3 (H) 07/04/2023    CALCIUM 8.7 07/14/2023    CAION 1.15 07/14/2023    PHOS 5.9 (H) 07/14/2023     Lab Results   Component Value Date    IRON 35 (L) 12/30/2021    TIBC 284 12/30/2021    FERRITIN 24.99 12/30/2021       Medications:  Medication list was reviewed and changes noted under Assessment/Plan.    Diagnostic Results:    Reviewed most recent CT/US/Echo/MRI    ASSESSMENT/PLAN:    1   ANTON / ATN  2   CKD  stage unspecified  3   SHPT  4    paraplegia  5   neurogenic bladder with suprapubic cath   6   Anemia  7   HTN  8   DM2  9   A Fib  10  Right knee infection   12  respiratory failure on vent       Plan    will change dialysis bath to 4.0 k              Will increase amlodipine to 10 mg daily              Carvidolol increase to 12.5 mg BID             DC water flushes with tube feeding               Decrease lasix to 20 mg BID              Labs in AM

## 2023-07-14 NOTE — NURSING
07/14/23 1030        Incision/Site 06/29/23 1103 Right Leg   Date First Assessed/Time First Assessed: 06/29/23 1103   Side: Right  Location: Leg   Wound Image   (right knee)   Dressing Appearance Intact;Moist drainage   Drainage Amount Small   Drainage Characteristics/Odor Serous;No odor   Appearance Pink;Red;Moist  (slow granulation)   Red (%), Wound Tissue Color 100 %   Periwound Area Edematous;Macerated   Wound Edges Irregular   Wound Length (cm) 2 cm   Wound Width (cm) 1.4 cm   Wound Depth (cm) 1.6 cm   Wound Volume (cm^3) 4.48 cm^3   Wound Surface Area (cm^2) 2.8 cm^2   Tunneling (depth (cm)/location) 12 noon 5.5cm   Care Cleansed with:;Sterile normal saline   Dressing Changed        Negative Pressure Wound Therapy  06/29/23 1120 Right anterior   Placement Date/Time: 06/29/23 1120   Side: Right  Orientation: anterior  Location: Leg   NPWT Type Vacuum Therapy   Therapy Setting NPWT Continuous therapy   Pressure Setting NPWT 125 mmHg   Sponges Inserted NPWT Black;White;1   Sponges Removed NPWT Black;White;1     Changed out wd vac sponges to left knee.  He tolerated well.  The wd itself is slow to progress.   Persistant edema to area and depth of tunnel at noon constant.   To re eval and change out again on next Tuesday.

## 2023-07-15 LAB
ALBUMIN SERPL-MCNC: 2 G/DL (ref 3.5–5)
ALLENS TEST BLOOD GAS (OHS): YES
ALLENS TEST BLOOD GAS (OHS): YES
BASE EXCESS BLD CALC-SCNC: 1.9 MMOL/L (ref -2–2)
BASE EXCESS BLD CALC-SCNC: 6.1 MMOL/L
BLOOD GAS SAMPLE TYPE (OHS): ABNORMAL
BLOOD GAS SAMPLE TYPE (OHS): ABNORMAL
BNP BLD-MCNC: 137 PG/ML
BUN SERPL-MCNC: 64.5 MG/DL (ref 8.4–25.7)
CA-I BLD-SCNC: 1.19 MMOL/L (ref 1.12–1.23)
CA-I BLD-SCNC: 1.2 MMOL/L (ref 1.12–1.23)
CALCIUM SERPL-MCNC: 8.5 MG/DL (ref 8.4–10.2)
CHLORIDE SERPL-SCNC: 98 MMOL/L (ref 98–107)
CO2 BLDA-SCNC: 30.6 MMOL/L
CO2 BLDA-SCNC: 32.6 MMOL/L
CO2 SERPL-SCNC: 22 MMOL/L (ref 22–29)
COHGB MFR BLDA: 0.6 % (ref 0.5–1.5)
CREAT SERPL-MCNC: 2.63 MG/DL (ref 0.73–1.18)
DRAWN BY BLOOD GAS (OHS): ABNORMAL
DRAWN BY BLOOD GAS (OHS): ABNORMAL
FIO2 BLOOD GAS (OHS): 30 %
FIO2 BLOOD GAS (OHS): 70 %
GFR SERPLBLD CREATININE-BSD FMLA CKD-EPI: 27 MLS/MIN/1.73/M2
GLUCOSE SERPL-MCNC: 121 MG/DL (ref 74–100)
HCO3 BLDA-SCNC: 28.9 MMOL/L (ref 22–26)
HCO3 BLDA-SCNC: 31.2 MMOL/L
MAGNESIUM SERPL-MCNC: 1.9 MG/DL (ref 1.6–2.6)
MECH RR (OHS): 16 B/MIN
MECH RR (OHS): 20 B/MIN
MECH VT (OHS): 450 ML
MECH VT (OHS): 450 ML
METHGB MFR BLDA: 0.9 % (ref 0.4–1.5)
MODE (OHS): ABNORMAL
MODE (OHS): ABNORMAL
O2 HB BLOOD GAS (OHS): 96.8 % (ref 94–97)
OXYGEN DEVICE BLOOD GAS (OHS): ABNORMAL
OXYHGB MFR BLDA: 10.8 G/DL (ref 12–16)
PCO2 BLDA: 46 MMHG (ref 35–45)
PCO2 BLDA: 56 MMHG (ref 35–45)
PEEP (OHS): 8 CMH2O
PEEP (OHS): 8 CMH2O
PH BLDA: 7.32 [PH] (ref 7.35–7.45)
PH BLDA: 7.44 [PH] (ref 7.35–7.45)
PHOSPHATE SERPL-MCNC: 5.2 MG/DL (ref 2.3–4.7)
PO2 BLDA: 101 MMHG (ref 80–100)
PO2 BLDA: 124 MMHG (ref 80–100)
POTASSIUM BLOOD GAS (OHS): 3.5 MMOL/L (ref 3.5–5)
POTASSIUM BLOOD GAS (OHS): 3.7 MMOL/L (ref 3.5–5)
POTASSIUM SERPL-SCNC: 4.2 MMOL/L (ref 3.5–5.1)
PS (OHS): 10 CMH2O
PS (OHS): 10 CMH2O
SAMPLE SITE BLOOD GAS (OHS): ABNORMAL
SAMPLE SITE BLOOD GAS (OHS): ABNORMAL
SAO2 % BLDA: 98 %
SAO2 % BLDA: 98.5 %
SODIUM BLOOD GAS (OHS): 131 MMOL/L (ref 137–145)
SODIUM BLOOD GAS (OHS): 132 MMOL/L (ref 137–145)
SODIUM SERPL-SCNC: 134 MMOL/L (ref 136–145)

## 2023-07-15 PROCEDURE — 63600175 PHARM REV CODE 636 W HCPCS: Performed by: STUDENT IN AN ORGANIZED HEALTH CARE EDUCATION/TRAINING PROGRAM

## 2023-07-15 PROCEDURE — P9047 ALBUMIN (HUMAN), 25%, 50ML: HCPCS | Mod: JZ,JG | Performed by: INTERNAL MEDICINE

## 2023-07-15 PROCEDURE — 63600175 PHARM REV CODE 636 W HCPCS: Performed by: INTERNAL MEDICINE

## 2023-07-15 PROCEDURE — 83735 ASSAY OF MAGNESIUM: CPT | Performed by: INTERNAL MEDICINE

## 2023-07-15 PROCEDURE — 25000242 PHARM REV CODE 250 ALT 637 W/ HCPCS

## 2023-07-15 PROCEDURE — 63600175 PHARM REV CODE 636 W HCPCS

## 2023-07-15 PROCEDURE — 94761 N-INVAS EAR/PLS OXIMETRY MLT: CPT

## 2023-07-15 PROCEDURE — 83880 ASSAY OF NATRIURETIC PEPTIDE: CPT | Performed by: INTERNAL MEDICINE

## 2023-07-15 PROCEDURE — 80100014 HC HEMODIALYSIS 1:1

## 2023-07-15 PROCEDURE — 20000000 HC ICU ROOM

## 2023-07-15 PROCEDURE — 63600175 PHARM REV CODE 636 W HCPCS: Mod: JZ,JG | Performed by: INTERNAL MEDICINE

## 2023-07-15 PROCEDURE — 94003 VENT MGMT INPAT SUBQ DAY: CPT

## 2023-07-15 PROCEDURE — 94640 AIRWAY INHALATION TREATMENT: CPT

## 2023-07-15 PROCEDURE — 25000003 PHARM REV CODE 250: Performed by: INTERNAL MEDICINE

## 2023-07-15 PROCEDURE — 99900026 HC AIRWAY MAINTENANCE (STAT)

## 2023-07-15 PROCEDURE — 36600 WITHDRAWAL OF ARTERIAL BLOOD: CPT

## 2023-07-15 PROCEDURE — 99900035 HC TECH TIME PER 15 MIN (STAT)

## 2023-07-15 PROCEDURE — 80069 RENAL FUNCTION PANEL: CPT | Performed by: INTERNAL MEDICINE

## 2023-07-15 PROCEDURE — 25000003 PHARM REV CODE 250

## 2023-07-15 PROCEDURE — 27000221 HC OXYGEN, UP TO 24 HOURS

## 2023-07-15 PROCEDURE — 82803 BLOOD GASES ANY COMBINATION: CPT

## 2023-07-15 PROCEDURE — 25000003 PHARM REV CODE 250: Performed by: STUDENT IN AN ORGANIZED HEALTH CARE EDUCATION/TRAINING PROGRAM

## 2023-07-15 PROCEDURE — 87070 CULTURE OTHR SPECIMN AEROBIC: CPT | Performed by: INTERNAL MEDICINE

## 2023-07-15 RX ORDER — ALBUMIN HUMAN 250 G/1000ML
25 SOLUTION INTRAVENOUS ONCE
Status: COMPLETED | OUTPATIENT
Start: 2023-07-15 | End: 2023-07-15

## 2023-07-15 RX ORDER — AMLODIPINE BESYLATE 5 MG/1
5 TABLET ORAL DAILY
Status: DISCONTINUED | OUTPATIENT
Start: 2023-07-16 | End: 2023-07-16

## 2023-07-15 RX ORDER — ACETYLCYSTEINE 100 MG/ML
4 SOLUTION ORAL; RESPIRATORY (INHALATION) EVERY 6 HOURS
Status: DISCONTINUED | OUTPATIENT
Start: 2023-07-15 | End: 2023-07-19

## 2023-07-15 RX ORDER — FENTANYL CITRATE 50 UG/ML
50 INJECTION, SOLUTION INTRAMUSCULAR; INTRAVENOUS
Status: DISPENSED | OUTPATIENT
Start: 2023-07-15 | End: 2023-07-15

## 2023-07-15 RX ORDER — FENTANYL CITRATE 50 UG/ML
50 INJECTION, SOLUTION INTRAMUSCULAR; INTRAVENOUS
Status: DISCONTINUED | OUTPATIENT
Start: 2023-07-15 | End: 2023-08-09 | Stop reason: HOSPADM

## 2023-07-15 RX ORDER — ALBUTEROL SULFATE 0.83 MG/ML
2.5 SOLUTION RESPIRATORY (INHALATION) EVERY 6 HOURS
Status: DISCONTINUED | OUTPATIENT
Start: 2023-07-15 | End: 2023-07-19

## 2023-07-15 RX ORDER — ALBUMIN HUMAN 250 G/1000ML
25 SOLUTION INTRAVENOUS ONCE
Status: DISCONTINUED | OUTPATIENT
Start: 2023-07-15 | End: 2023-07-15

## 2023-07-15 RX ORDER — LACTULOSE 10 G/15ML
10 SOLUTION ORAL 3 TIMES DAILY
Status: CANCELLED | OUTPATIENT
Start: 2023-07-15 | End: 2023-07-16

## 2023-07-15 RX ADMIN — MICONAZOLE NITRATE 2 % TOPICAL POWDER: at 08:07

## 2023-07-15 RX ADMIN — GUAIFENESIN 400 MG: 200 SOLUTION ORAL at 05:07

## 2023-07-15 RX ADMIN — ENOXAPARIN SODIUM 30 MG: 30 INJECTION SUBCUTANEOUS at 04:07

## 2023-07-15 RX ADMIN — PROPOFOL 50 MCG/KG/MIN: 10 INJECTION, EMULSION INTRAVENOUS at 06:07

## 2023-07-15 RX ADMIN — FENTANYL CITRATE 50 MCG: 50 INJECTION, SOLUTION INTRAMUSCULAR; INTRAVENOUS at 09:07

## 2023-07-15 RX ADMIN — GUAIFENESIN 400 MG: 200 SOLUTION ORAL at 09:07

## 2023-07-15 RX ADMIN — DEXMEDETOMIDINE HYDROCHLORIDE 1.4 MCG/KG/HR: 400 INJECTION INTRAVENOUS at 11:07

## 2023-07-15 RX ADMIN — SENNOSIDES AND DOCUSATE SODIUM 2 TABLET: 50; 8.6 TABLET ORAL at 08:07

## 2023-07-15 RX ADMIN — PROPOFOL 50 MCG/KG/MIN: 10 INJECTION, EMULSION INTRAVENOUS at 02:07

## 2023-07-15 RX ADMIN — DEXMEDETOMIDINE HYDROCHLORIDE 1.4 MCG/KG/HR: 400 INJECTION INTRAVENOUS at 02:07

## 2023-07-15 RX ADMIN — PROPOFOL 50 MCG/KG/MIN: 10 INJECTION, EMULSION INTRAVENOUS at 12:07

## 2023-07-15 RX ADMIN — ALBUTEROL SULFATE 2.5 MG: 2.5 SOLUTION RESPIRATORY (INHALATION) at 07:07

## 2023-07-15 RX ADMIN — PROPOFOL 50 MCG/KG/MIN: 10 INJECTION, EMULSION INTRAVENOUS at 11:07

## 2023-07-15 RX ADMIN — FUROSEMIDE 20 MG: 10 INJECTION, SOLUTION INTRAMUSCULAR; INTRAVENOUS at 08:07

## 2023-07-15 RX ADMIN — FAMOTIDINE 20 MG: 10 INJECTION, SOLUTION INTRAVENOUS at 08:07

## 2023-07-15 RX ADMIN — ATORVASTATIN CALCIUM 20 MG: 10 TABLET, FILM COATED ORAL at 08:07

## 2023-07-15 RX ADMIN — GUAIFENESIN 400 MG: 200 SOLUTION ORAL at 01:07

## 2023-07-15 RX ADMIN — DEXMEDETOMIDINE HYDROCHLORIDE 1.4 MCG/KG/HR: 400 INJECTION INTRAVENOUS at 04:07

## 2023-07-15 RX ADMIN — DOXYCYCLINE HYCLATE 100 MG: 100 TABLET, COATED ORAL at 08:07

## 2023-07-15 RX ADMIN — DEXMEDETOMIDINE HYDROCHLORIDE 1.4 MCG/KG/HR: 400 INJECTION INTRAVENOUS at 01:07

## 2023-07-15 RX ADMIN — AMLODIPINE BESYLATE 10 MG: 5 TABLET ORAL at 08:07

## 2023-07-15 RX ADMIN — CISATRACURIUM BESYLATE 1 MCG/KG/MIN: 10 INJECTION, SOLUTION INTRAVENOUS at 07:07

## 2023-07-15 RX ADMIN — CARVEDILOL 12.5 MG: 12.5 TABLET, FILM COATED ORAL at 08:07

## 2023-07-15 RX ADMIN — OXYBUTYNIN CHLORIDE 5 MG: 5 TABLET ORAL at 08:07

## 2023-07-15 RX ADMIN — METHYLNALTREXONE BROMIDE 6.4 MG: 12 INJECTION, SOLUTION SUBCUTANEOUS at 08:07

## 2023-07-15 RX ADMIN — GUAIFENESIN 400 MG: 200 SOLUTION ORAL at 04:07

## 2023-07-15 RX ADMIN — FENOFIBRATE 48 MG: 48 TABLET, FILM COATED ORAL at 08:07

## 2023-07-15 RX ADMIN — GUAIFENESIN 400 MG: 200 SOLUTION ORAL at 12:07

## 2023-07-15 RX ADMIN — SODIUM BICARBONATE 650 MG TABLET 650 MG: at 08:07

## 2023-07-15 RX ADMIN — ALBUMIN (HUMAN) 25 G: 12.5 SOLUTION INTRAVENOUS at 04:07

## 2023-07-15 RX ADMIN — PROPOFOL 50 MCG/KG/MIN: 10 INJECTION, EMULSION INTRAVENOUS at 04:07

## 2023-07-15 RX ADMIN — DEXMEDETOMIDINE HYDROCHLORIDE 1.4 MCG/KG/HR: 400 INJECTION INTRAVENOUS at 08:07

## 2023-07-15 RX ADMIN — OXYCODONE HYDROCHLORIDE AND ACETAMINOPHEN 1 TABLET: 10; 325 TABLET ORAL at 12:07

## 2023-07-15 RX ADMIN — OXYCODONE HYDROCHLORIDE AND ACETAMINOPHEN 1 TABLET: 10; 325 TABLET ORAL at 05:07

## 2023-07-15 RX ADMIN — GUAIFENESIN 400 MG: 200 SOLUTION ORAL at 10:07

## 2023-07-15 RX ADMIN — ACETYLCYSTEINE 4 ML: 100 INHALANT RESPIRATORY (INHALATION) at 07:07

## 2023-07-15 NOTE — NURSING
Nurses Note -- 4 Eyes      7/15/2023   5:01 PM      Skin assessed during: Daily Assessment      [] No Altered Skin Integrity Present    []Prevention Measures Documented      [x] Yes- Altered Skin Integrity Present or Discovered   [] LDA Added if Not in Epic (Describe Wound)   [] New Altered Skin Integrity was Present on Admit and Documented in LDA   [] Wound Image Taken    Wound Care Consulted? Yes    Attending Nurse:  Pedro Phelan RN     Second RN/Staff Member:  Robb Saavedra RN

## 2023-07-15 NOTE — NURSING
Nurses Note -- 4 Eyes      7/14/2023   10:09 PM      Skin assessed during: Daily Assessment      [] No Altered Skin Integrity Present    [x]Prevention Measures Documented      [x] Yes- Altered Skin Integrity Present or Discovered   [] LDA Added if Not in Epic (Describe Wound)   [] New Altered Skin Integrity was Present on Admit and Documented in LDA   [] Wound Image Taken    Wound Care Consulted? Yes    Attending Nurse:  Starr Sommers RN     Second RN/Staff Member:  Maxim Caballero RN

## 2023-07-15 NOTE — PROGRESS NOTES
Chief complaint: N/A    Interval History:  Pt remains sedated on vent, FiO2 70%, with apparent random episodes of respiratory decompensation.  He had 3 L UF on HD yesterday along with about 2 L Uout, strongly negative fluid balance yesterday, VSS    Review of Systems   Unable to perform ROS: Intubated    all else Neg     amLODIPine  10 mg Per NG tube Daily    atorvastatin  20 mg Per NG tube Nightly    carvediloL  12.5 mg Per NG tube BID    cefTRIAXone (ROCEPHIN) IVPB  2 g Intravenous Q24H    doxycycline  100 mg Per NG tube Q12H    enoxaparin  30 mg Subcutaneous Daily    epoetin franklin  20,000 Units Subcutaneous Every Mon, Wed, Fri    famotidine (PF)  20 mg Intravenous Daily    fenofibrate  48 mg Per NG tube Daily    furosemide (LASIX) injection  20 mg Intravenous BID    guaiFENesin 100 mg/5 ml  400 mg Per NG tube Q4H    methylnaltrexone  0.075 mg/kg Subcutaneous Daily    miconazole NITRATE 2 %   Topical (Top) BID    oxybutynin  5 mg Per NG tube BID    senna-docusate 8.6-50 mg  2 tablet Per NG tube BID    sodium bicarbonate  650 mg Per NG tube BID    sodium citrate-citric acid 500-334 mg/5 ml  30 mL Oral Once       Objective     VITAL SIGNS: 24 HR MIN & MAX LAST    Temp  Min: 96.4 °F (35.8 °C)  Max: 98.6 °F (37 °C)  98.2 °F (36.8 °C)    BP  Min: 109/62  Max: 177/94  137/77     Pulse  Min: 60  Max: 83  72     Resp  Min: 15  Max: 28  (!) 22    SpO2  Min: 92 %  Max: 100 %  100 %      Wt Readings from Last 3 Encounters:   07/04/23 86.2 kg (190 lb)   09/13/22 86.2 kg (190 lb)   08/25/22 86.2 kg (190 lb 0.6 oz)       Intake/Output Summary (Last 24 hours) at 7/15/2023 1330  Last data filed at 7/15/2023 1227  Gross per 24 hour   Intake 2250.26 ml   Output 4700 ml   Net -2449.74 ml       Physical Exam  Constitutional:       General: He is not in acute distress.  HENT:      Mouth/Throat:      Comments: Intubated on vent; TF's at 35 cc/hr  Cardiovascular:      Rate and Rhythm: Normal rate.   Pulmonary:      Effort: No  respiratory distress.      Comments: Mechanically ventilated BS  Abdominal:      General: Bowel sounds are normal. There is distension.      Palpations: Abdomen is soft.   Genitourinary:     Comments: Stover dark Uout  Musculoskeletal:         General: Swelling present.   Neurological:      Comments: Sedated on vent        Recent Labs     07/13/23  0135 07/14/23  0111 07/15/23  0145   * 132* 134*   K 3.7 3.8 4.2   CHLORIDE 96* 96* 98   CO2 25 22 22   BUN 56.3* 104.2* 64.5*   CREATININE 3.07* 4.09* 2.63*   GLUCOSE 150* 118* 121*   CALCIUM 8.3* 8.7 8.5   MG  --   --  1.90   PHOS 4.7 5.9* 5.2*   ALBUMIN 2.0* 1.9* 2.0*      Recent Labs     07/14/23  0744   WBC 11.38   HGB 9.6*   HCT 29.0*   *         Assessment & Plan     1   ANTON / ATN - nonoliguric, s/p HD yesterday with 3 L UF as well as 1.8 L Uout.  He may remain volume overloaded, will attempt additional UF as tolerated with HD today, will reassess tomorrow  2   CKD unspecified? Baseline creatinine 1.0 in July 2022  3   Resp failure, pneumonitis - ? Volume status, he tolerated UF, diuresis, VSS, remains with hypoxemic resp failure, will attempt additional UF with HD today, reassess tomorrow.  He remains on rocephin, doxycycline  4   SHPT - phos 5.2,  on admit, HD today, monitor    5   paraplegia, neurogenic bladder with suprapubic cath   6   Anemia - on EPO, tolerating sig fluid removal, rpt CBC in am  7   HTN - normotensive, reduce amlodipine to 5 mg, continue UF as tolerated  8   DM2  9   A Fib  10  Right knee infection

## 2023-07-15 NOTE — PROGRESS NOTES
Infectious Diseases Progress Note  58-year-old male with past medical history of T-spine cord injury with paraplegia, diabetes, HTN, hepatitis-C, chronic right ankle wound/osteomyelitis, was on suppressive doxycycline, known to my service, seen by us initially at [facility] and has followed with us at the office for chronic osteomyelitis, is admitted to [facility] on 06/28/2023 presenting through the ED with complaints of pain and swelling to his right knee, apparently struck his knee.  He did also report prior remote right knee surgery.  He was evaluated and noted to have no fevers initially but a temperature of 100.2° today 6/29, no leukocytosis but with thrombocytosis of up to 531 today.  .2 and ESR >130.  He is anemic with low albumin.  Blood cultures have been negative.  CT scan of the right knee noted a 5 cm area of subcutaneous fluid collection in the prepatellar region.  There was aspiration in the ER with findings of purulent fluid and cultures showing group B Streptococcus.  He was seen by the orthopedic surgery team with findings of right prepatellar bursa abscess and is status post incision and drainage of right knee septic arthritis and right prepatellar bursa abscess today 6/29 with group B Streptococcus       Subjective:  Remains in the ICU on ventilator.  No new complaints, no fevers, doing about the same.  Lying in bed in no acute distress      Past Medical History:   Diagnosis Date    Arthritis     Chronic ulcer of ankle 05/26/2022    Frequent UTI 07/02/2019    Generalized anxiety disorder 05/26/2022    Neurogenic bladder 05/26/2022    Osteomyelitis 05/26/2022    Presence of suprapubic catheter 05/26/2022    Pure hypercholesterolemia 05/26/2022    Retention of urine, unspecified 08/09/2019    Spinal cord injury at T1-T6 level 04/20/2018     Past Surgical History:   Procedure Laterality Date    INCISION AND DRAINAGE, LOWER EXTREMITY Right 6/29/2023     Procedure: INCISION AND DRAINAGE, LOWER EXTREMITY;  Surgeon: Prabhu Shen DO;  Location: [facility] OR;  Service: Orthopedics;  Laterality: Right;  supine bone foam wash stuff cultures    INSERTION OF INTRAMEDULLARY MARISA Right 8/10/2022    Procedure: INSERTION, INTRAMEDULLARY MARISA RIGHT TIBIA;  Surgeon: Jorge Orellana MD;  Location: [facility] OR;  Service: Orthopedics;  Laterality: Right;     Social History     Socioeconomic History    Marital status:    Tobacco Use    Smoking status: Every Day     Types: Cigars, Cigarettes    Smokeless tobacco: Never   Substance and Sexual Activity    Alcohol use: Yes     Alcohol/week: 2.0 standard drinks     Types: 2 Cans of beer per week    Drug use: Not Currently    Sexual activity: Never       Review of Systems   Unable to perform ROS: Intubated       Review of patient's allergies indicates:   Allergen Reactions    Baclofen Itching and Anxiety         Scheduled Meds:   [START ON 7/15/2023] amLODIPine  10 mg Per NG tube Daily    atorvastatin  20 mg Per NG tube Nightly    carvediloL  12.5 mg Per NG tube BID    cefTRIAXone (ROCEPHIN) IVPB  2 g Intravenous Q24H    doxycycline  100 mg Per NG tube Q12H    enoxaparin  30 mg Subcutaneous Daily    epoetin franklin  20,000 Units Subcutaneous Every Mon, Wed, Fri    famotidine (PF)  20 mg Intravenous Daily    fenofibrate  48 mg Per NG tube Daily    fentaNYL        furosemide (LASIX) injection  20 mg Intravenous BID    guaiFENesin 100 mg/5 ml  400 mg Per NG tube Q4H    methylnaltrexone  0.075 mg/kg Subcutaneous Daily    miconazole NITRATE 2 %   Topical (Top) BID    oxybutynin  5 mg Per NG tube BID    senna-docusate 8.6-50 mg  2 tablet Per NG tube BID    sodium bicarbonate  650 mg Per NG tube BID    sodium citrate-citric acid 500-334 mg/5 ml  30 mL Oral Once     Continuous Infusions:   dexmedeTOMIDine (Precedex) infusion (titrating) 1.4 mcg/kg/hr (07/14/23 2236)    NORepinephrine bitartrate-D5W Stopped (07/12/23 1039)    propofoL 50 mcg/kg/min  "(07/14/23 1192)     PRN Meds:sodium chloride, acetaminophen, hydrALAZINE, labetalol, oxyCODONE-acetaminophen, sodium chloride 0.9%, sodium chloride 0.9%    Objective:  /67   Pulse 68   Temp 98.6 °F (37 °C) (Oral)   Resp (!) 21   Ht 5' 9.69" (1.77 m)   Wt 86.2 kg (190 lb)   SpO2 97%   BMI 27.51 kg/m²     Physical Exam:   Physical Exam  Vitals reviewed.   Constitutional:       General: He is not in acute distress.     Appearance: He is ill-appearing.   HENT:      Head: Normocephalic and atraumatic.      Mouth/Throat:      Comments: ET tube in place  Cardiovascular:      Rate and Rhythm: Normal rate and regular rhythm.      Heart sounds: Normal heart sounds.   Pulmonary:      Effort: No respiratory distress.      Comments: Coarse breath sounds on ventilator  Abdominal:      General: Bowel sounds are normal. There is no distension.      Palpations: Abdomen is soft.      Tenderness: There is no abdominal tenderness.   Genitourinary:     Comments: Suprapubic catheter  Musculoskeletal:      Cervical back: Neck supple.      Comments: Some contracture of lower extremities   Skin:     Findings: No rash.      Comments: Right knee with wound VAC. Right ankle and left heel wounds dressed   Neurological:      Comments: Unable to fully evaluate, sedated on ventilator   Psychiatric:      Comments: Not communicative     Imaging  Imaging Results              CT Knee W W/O Contrast Right (Final result)  Result time 06/28/23 18:37:42      Final result by Gustavo Cassidy MD (06/28/23 18:37:42)                   Impression:      Mildly limited assessment.  5 cm area of subcutaneous fluid in the prepatellar region may have thin peripheral enhancement.  Fluid collection is possible.    Subcutaneous edema in the calf.      Electronically signed by: Gustavo Cassidy  Date:    06/28/2023  Time:    18:37               Narrative:    EXAMINATION:  CT KNEE W W/O CONTRAST RIGHT    CLINICAL HISTORY:  possible infection;    TECHNIQUE:  CT " imaging of the right knee before and after IV contrast.  Axial, coronal and sagittal reformatted images reviewed.   Dose length product is 585 mGycm. Automatic exposure control, adjustment of mA/kV or iterative reconstruction technique used to limit radiation dose.    COMPARISON:  Radiograph 06/28/2023    FINDINGS:  Assessment mildly limited by motion.  Joint alignments are maintained with degenerative changes at the knee.  Fixation hardware in the lower femur and tibia with remote proximal fibular fracture.  Areas of heterotopic ossification along the lower portion of the femur.  Small knee effusion.  Areas of subcutaneous edema anteriorly below the knee.  More focal area of fluid in the subcutaneous tissues of the prepatellar region measures up to 5 cm in transverse diameter and 5 cm in length.  There is image noise from the hardware, but this fluid may have thin areas of peripheral enhancement.  No tracking subcutaneous gas.                                       X-Ray Tibia Fibula 2 View Right (Final result)  Result time 06/28/23 18:14:06      Final result by Tracy Zamudio MD (06/28/23 18:14:06)                   Impression:      Posttraumatic and postsurgical changes at the femur and lower leg.  There is chronic deformity at the distal femur with heterogeneous sclerosis and lucency superimposed on background bony demineralization.  No old imaging of this region available for comparison.  This makes it difficult to evaluate for acute superimposed lytic change.    Postsurgical and posttraumatic change at the tibia and fibula with bony demineralization.  No appreciable acute osseous abnormality.      Electronically signed by: Tracy Zamudio  Date:    06/28/2023  Time:    18:14               Narrative:    EXAMINATION:  XR FEMUR 2 VIEW RIGHT; XR TIBIA FIBULA 2 VIEW RIGHT    CLINICAL HISTORY:  possible infection;; infection;    TECHNIQUE:  AP and lateral views of the right femur were performed.    AP and  lateral views of the right tibia and fibula were obtained.    COMPARISON:  Knee radiographs 06/28/2023, tibia and fibular radiographs 08/10/2022    FINDINGS:  There are postsurgical changes of ORIF at the distal femur with lateral plate and screws.  There are postsurgical changes of ORIF at the tibia with antegrade intramedullary raven and proximal and distal interlocking screws.  Hardware appears intact.  No acute fracture seen.  There are old, healed fracture deformities.    There is chronic remodeling at the distal femur with heterogeneous appearance of the marrow and cortex distally.  There are areas of lucency as well as sclerosis.  There is no imaging through the distal femur available for comparison to evaluate for acute lytic changes superimposed on chronic background posttraumatic and postsurgical change.  Older prior radiographs of the tibia and fibula do not adequately imaged the area.  The bones are demineralized.    There is soft tissue swelling at the knee.  There is soft tissue swelling at the ankle.                                       X-Ray Femur 2 View Right (Final result)  Result time 06/28/23 18:14:06      Final result by Tracy Zamudio MD (06/28/23 18:14:06)                   Impression:      Posttraumatic and postsurgical changes at the femur and lower leg.  There is chronic deformity at the distal femur with heterogeneous sclerosis and lucency superimposed on background bony demineralization.  No old imaging of this region available for comparison.  This makes it difficult to evaluate for acute superimposed lytic change.    Postsurgical and posttraumatic change at the tibia and fibula with bony demineralization.  No appreciable acute osseous abnormality.      Electronically signed by: Tracy Zamudio  Date:    06/28/2023  Time:    18:14               Narrative:    EXAMINATION:  XR FEMUR 2 VIEW RIGHT; XR TIBIA FIBULA 2 VIEW RIGHT    CLINICAL HISTORY:  possible infection;;  infection;    TECHNIQUE:  AP and lateral views of the right femur were performed.    AP and lateral views of the right tibia and fibula were obtained.    COMPARISON:  Knee radiographs 06/28/2023, tibia and fibular radiographs 08/10/2022    FINDINGS:  There are postsurgical changes of ORIF at the distal femur with lateral plate and screws.  There are postsurgical changes of ORIF at the tibia with antegrade intramedullary raven and proximal and distal interlocking screws.  Hardware appears intact.  No acute fracture seen.  There are old, healed fracture deformities.    There is chronic remodeling at the distal femur with heterogeneous appearance of the marrow and cortex distally.  There are areas of lucency as well as sclerosis.  There is no imaging through the distal femur available for comparison to evaluate for acute lytic changes superimposed on chronic background posttraumatic and postsurgical change.  Older prior radiographs of the tibia and fibula do not adequately imaged the area.  The bones are demineralized.    There is soft tissue swelling at the knee.  There is soft tissue swelling at the ankle.                                       X-Ray Knee 3 View Right (Final result)  Result time 06/28/23 14:18:03      Final result by Lanette Waller MD (06/28/23 14:18:03)                   Impression:      1. No acute bony abnormality.  2. Soft tissue swelling around the knee.      Electronically signed by: Lanette Waller  Date:    06/28/2023  Time:    14:18               Narrative:    EXAMINATION:  XR KNEE 3 VIEW RIGHT    CLINICAL HISTORY:  Effusion, right knee    COMPARISON:  None.    FINDINGS:  There has been prior internal fixation of the femur and tibia.  There are chronic changes of the bones with heterotopic ossification around the knee.  There is no acute fracture identified.  There is soft tissue swelling around the knee.                                       Lab Review   Recent Results (from the past 24  hour(s))   TSH    Collection Time: 07/14/23  1:11 AM   Result Value Ref Range    Thyroid Stimulating Hormone 1.433 0.350 - 4.940 uIU/mL   Cortisol    Collection Time: 07/14/23  1:11 AM   Result Value Ref Range    Cortisol Level 6.9 ug/dL   Renal Function Panel    Collection Time: 07/14/23  1:11 AM   Result Value Ref Range    Sodium Level 132 (L) 136 - 145 mmol/L    Potassium Level 3.8 3.5 - 5.1 mmol/L    Chloride 96 (L) 98 - 107 mmol/L    Carbon Dioxide 22 22 - 29 mmol/L    Glucose Level 118 (H) 74 - 100 mg/dL    Blood Urea Nitrogen 104.2 (H) 8.4 - 25.7 mg/dL    Creatinine 4.09 (H) 0.73 - 1.18 mg/dL    Calcium Level Total 8.7 8.4 - 10.2 mg/dL    Albumin Level 1.9 (L) 3.5 - 5.0 g/dL    Phosphorus Level 5.9 (H) 2.3 - 4.7 mg/dL    eGFR 16 mls/min/1.73/m2   BNP    Collection Time: 07/14/23  1:11 AM   Result Value Ref Range    Natriuretic Peptide 309.3 (H) <=100.0 pg/mL   CBC with Differential    Collection Time: 07/14/23  7:44 AM   Result Value Ref Range    WBC 11.38 4.50 - 11.50 x10(3)/mcL    RBC 3.83 (L) 4.70 - 6.10 x10(6)/mcL    Hgb 9.6 (L) 14.0 - 18.0 g/dL    Hct 29.0 (L) 42.0 - 52.0 %    MCV 75.7 (L) 80.0 - 94.0 fL    MCH 25.1 (L) 27.0 - 31.0 pg    MCHC 33.1 33.0 - 36.0 g/dL    RDW 20.0 (H) 11.5 - 17.0 %    Platelet 473 (H) 130 - 400 x10(3)/mcL    MPV 10.3 7.4 - 10.4 fL    Neut % 66.5 %    Lymph % 17.5 %    Mono % 14.1 %    Eos % 0.8 %    Basophil % 0.4 %    Lymph # 1.99 0.6 - 4.6 x10(3)/mcL    Neut # 7.58 2.1 - 9.2 x10(3)/mcL    Mono # 1.60 (H) 0.1 - 1.3 x10(3)/mcL    Eos # 0.09 0 - 0.9 x10(3)/mcL    Baso # 0.04 <=0.2 x10(3)/mcL    IG# 0.08 (H) 0 - 0.04 x10(3)/mcL    IG% 0.7 %    NRBC% 0.0 %   RT Blood Gas    Collection Time: 07/14/23  9:57 AM   Result Value Ref Range    Sample Type Arterial Blood     Sample site Right Radial Artery     Drawn by TP RRT     pH, Blood gas 7.450 7.350 - 7.450    pCO2, Blood gas 41.0 35.0 - 45.0 mmHg    pO2, Blood gas 125.0 (H) 80.0 - 100.0 mmHg    Sodium, Blood Gas 131 (L) 137 - 145  mmol/L    Potassium, Blood Gas 3.1 (L) 3.5 - 5.0 mmol/L    Calcium Level Ionized 1.15 1.12 - 1.23 mmol/L    TOC2, Blood gas 29.8 mmol/L    Base Excess, Blood gas 4.10 mmol/L    sO2, Blood gas 99.0 %    HCO3, Blood gas 28.5 >=15.0 mmol/L    Allens Test Yes     MODE AC     FIO2, Blood gas 40 %    Mech Vt 450 ml    Mech RR 20 b/min    PEEP 5.0 cmH2O             Assessment/Plan:  1. SIRS with fevers  2. Group B Streptococcus Right knee prepatellar abscess  3. Group B Streptococcus Right knee septic arthritis  4.  Anemia/reactive thrombocytosis  5.  Paraplegia secondary to T-spine cord injury  6. History of hepatitis-C   7. Chronic right ankle wound/osteomyelitis  8.  Diabetes    9.  Acute kidney injury  10. Acute hypoxic respiratory failure  11. Pulmonary edema with pleural effusions/ Pneumonitis      -Continue ceftriaxone with end date of 08/09 and maintain chronic suppressive doxycycline, following inflammatory markers  -No fevers and no leukocytosis   -7/4 and and 7/8 blood cultures remain negative and sputum culture with moderate yeast.    -7/14 CXR results noted  -7/4 CT scan of the abdomen and pelvis and 7/3 chest x-ray results noted, likely dealing with pulmonary edema with pleural effusions and possibly superimposed infectious pneumonitis. We will get procalcitonin level for further evaluation  -6/28 right knee abscess culture with Group B Streptococcus  -Seen by Orthopedic surgery team calm s/p I&D of R prepatellar bursa abscess and septic R knee with cultures yielding Group G Streptococcus  -6/28 blood cultures negative  -Multiple comorbidities, paraplegic with chronic pressure wounds, right ankle osteomyelitis with exposed bone on chronic suppressive doxycycline  -We will continue surgical site care per Orthopedic surgery team  -We will continue wound care  -He is to continue management of his hepatitis-C as outpatient  -Renal impairment noted, HD per Nephrology, started 7/4  -7/3 Renal ultrasound results  noted  -Continue ventilator management and ICU support per intensivist  -Discussed with nursing staff.

## 2023-07-15 NOTE — NURSING
07/15/23 1716   Post-Hemodialysis Assessment   Blood Volume Processed (Liters) 32.6 L   Dialyzer Clearance Lightly streaked   Duration of Treatment 120 minutes   Total UF (mL) 2000 mL   Patient Response to Treatment Tolerated well   Post-Hemodialysis Comments Treatment completed. NET fluid removed = 2 liters. UF goal lowered for b/p 86/54. Albumin given. No other issues. Post b/p 127/75

## 2023-07-16 LAB
ALBUMIN SERPL-MCNC: 2.5 G/DL (ref 3.5–5)
ALBUMIN/GLOB SERPL: 0.6 RATIO (ref 1.1–2)
ALLENS TEST BLOOD GAS (OHS): YES
ALP SERPL-CCNC: 90 UNIT/L (ref 40–150)
ALT SERPL-CCNC: 7 UNIT/L (ref 0–55)
AST SERPL-CCNC: 16 UNIT/L (ref 5–34)
BASE EXCESS BLD CALC-SCNC: 1.5 MMOL/L (ref -2–2)
BASOPHILS # BLD AUTO: 0.03 X10(3)/MCL
BASOPHILS NFR BLD AUTO: 0.3 %
BILIRUBIN DIRECT+TOT PNL SERPL-MCNC: 0.3 MG/DL
BLOOD GAS SAMPLE TYPE (OHS): ABNORMAL
BUN SERPL-MCNC: 69.4 MG/DL (ref 8.4–25.7)
CA-I BLD-SCNC: 1.25 MMOL/L (ref 1.12–1.23)
CALCIUM SERPL-MCNC: 8.8 MG/DL (ref 8.4–10.2)
CHLORIDE SERPL-SCNC: 100 MMOL/L (ref 98–107)
CO2 BLDA-SCNC: 29.5 MMOL/L
CO2 SERPL-SCNC: 24 MMOL/L (ref 22–29)
COHGB MFR BLDA: 3.1 % (ref 0.5–1.5)
CREAT SERPL-MCNC: 2.72 MG/DL (ref 0.73–1.18)
DRAWN BY BLOOD GAS (OHS): ABNORMAL
EOSINOPHIL # BLD AUTO: 0.15 X10(3)/MCL (ref 0–0.9)
EOSINOPHIL NFR BLD AUTO: 1.6 %
ERYTHROCYTE [DISTWIDTH] IN BLOOD BY AUTOMATED COUNT: 20.4 % (ref 11.5–17)
FIO2 BLOOD GAS (OHS): 60 %
GFR SERPLBLD CREATININE-BSD FMLA CKD-EPI: 26 MLS/MIN/1.73/M2
GLOBULIN SER-MCNC: 4 GM/DL (ref 2.4–3.5)
GLUCOSE SERPL-MCNC: 141 MG/DL (ref 74–100)
HCO3 BLDA-SCNC: 27.9 MMOL/L (ref 22–26)
HCT VFR BLD AUTO: 26.2 % (ref 42–52)
HGB BLD-MCNC: 8.6 G/DL (ref 14–18)
IMM GRANULOCYTES # BLD AUTO: 0.06 X10(3)/MCL (ref 0–0.04)
IMM GRANULOCYTES NFR BLD AUTO: 0.6 %
LYMPHOCYTES # BLD AUTO: 2.28 X10(3)/MCL (ref 0.6–4.6)
LYMPHOCYTES NFR BLD AUTO: 24.4 %
MCH RBC QN AUTO: 24.9 PG (ref 27–31)
MCHC RBC AUTO-ENTMCNC: 32.8 G/DL (ref 33–36)
MCV RBC AUTO: 75.9 FL (ref 80–94)
MECH VT (OHS): 500 ML
METHGB MFR BLDA: 0 % (ref 0.4–1.5)
MODE (OHS): ABNORMAL
MONOCYTES # BLD AUTO: 1.08 X10(3)/MCL (ref 0.1–1.3)
MONOCYTES NFR BLD AUTO: 11.6 %
NEUTROPHILS # BLD AUTO: 5.75 X10(3)/MCL (ref 2.1–9.2)
NEUTROPHILS NFR BLD AUTO: 61.5 %
NRBC BLD AUTO-RTO: 0 %
O2 HB BLOOD GAS (OHS): 97.3 % (ref 94–97)
OXYGEN DEVICE BLOOD GAS (OHS): ABNORMAL
OXYHGB MFR BLDA: 8.3 G/DL (ref 12–16)
PCO2 BLDA: 53 MMHG (ref 35–45)
PEEP (OHS): 8 CMH2O
PH BLDA: 7.33 [PH] (ref 7.35–7.45)
PHOSPHATE SERPL-MCNC: 5.7 MG/DL (ref 2.3–4.7)
PLATELET # BLD AUTO: 542 X10(3)/MCL (ref 130–400)
PMV BLD AUTO: 10.2 FL (ref 7.4–10.4)
PO2 BLDA: 92 MMHG (ref 80–100)
POTASSIUM BLOOD GAS (OHS): 3.7 MMOL/L (ref 3.5–5)
POTASSIUM SERPL-SCNC: 4 MMOL/L (ref 3.5–5.1)
PROT SERPL-MCNC: 6.5 GM/DL (ref 6.4–8.3)
PS (OHS): 10 CMH2O
RBC # BLD AUTO: 3.45 X10(6)/MCL (ref 4.7–6.1)
SAMPLE SITE BLOOD GAS (OHS): ABNORMAL
SAO2 % BLDA: 96.5 %
SODIUM BLOOD GAS (OHS): 132 MMOL/L (ref 137–145)
SODIUM SERPL-SCNC: 135 MMOL/L (ref 136–145)
WBC # SPEC AUTO: 9.35 X10(3)/MCL (ref 4.5–11.5)

## 2023-07-16 PROCEDURE — 63600175 PHARM REV CODE 636 W HCPCS

## 2023-07-16 PROCEDURE — 99900035 HC TECH TIME PER 15 MIN (STAT)

## 2023-07-16 PROCEDURE — 99900026 HC AIRWAY MAINTENANCE (STAT)

## 2023-07-16 PROCEDURE — 27200966 HC CLOSED SUCTION SYSTEM

## 2023-07-16 PROCEDURE — 85025 COMPLETE CBC W/AUTO DIFF WBC: CPT | Performed by: INTERNAL MEDICINE

## 2023-07-16 PROCEDURE — 99900031 HC PATIENT EDUCATION (STAT)

## 2023-07-16 PROCEDURE — 82803 BLOOD GASES ANY COMBINATION: CPT

## 2023-07-16 PROCEDURE — 27202055 HC BRONCHOSCOPE, DISP

## 2023-07-16 PROCEDURE — 25000003 PHARM REV CODE 250

## 2023-07-16 PROCEDURE — 94003 VENT MGMT INPAT SUBQ DAY: CPT

## 2023-07-16 PROCEDURE — 80053 COMPREHEN METABOLIC PANEL: CPT | Performed by: INTERNAL MEDICINE

## 2023-07-16 PROCEDURE — 63600175 PHARM REV CODE 636 W HCPCS: Performed by: INTERNAL MEDICINE

## 2023-07-16 PROCEDURE — 27000221 HC OXYGEN, UP TO 24 HOURS

## 2023-07-16 PROCEDURE — 36600 WITHDRAWAL OF ARTERIAL BLOOD: CPT

## 2023-07-16 PROCEDURE — 84100 ASSAY OF PHOSPHORUS: CPT | Performed by: INTERNAL MEDICINE

## 2023-07-16 PROCEDURE — 94761 N-INVAS EAR/PLS OXIMETRY MLT: CPT

## 2023-07-16 PROCEDURE — 25000003 PHARM REV CODE 250: Performed by: INTERNAL MEDICINE

## 2023-07-16 PROCEDURE — 94640 AIRWAY INHALATION TREATMENT: CPT

## 2023-07-16 PROCEDURE — 99900025 HC BRONCHOSCOPY-ASST (STAT)

## 2023-07-16 PROCEDURE — 25000242 PHARM REV CODE 250 ALT 637 W/ HCPCS

## 2023-07-16 PROCEDURE — 20000000 HC ICU ROOM

## 2023-07-16 PROCEDURE — 25000003 PHARM REV CODE 250: Performed by: STUDENT IN AN ORGANIZED HEALTH CARE EDUCATION/TRAINING PROGRAM

## 2023-07-16 PROCEDURE — 63600175 PHARM REV CODE 636 W HCPCS: Performed by: STUDENT IN AN ORGANIZED HEALTH CARE EDUCATION/TRAINING PROGRAM

## 2023-07-16 RX ADMIN — DEXMEDETOMIDINE HYDROCHLORIDE 1.4 MCG/KG/HR: 400 INJECTION INTRAVENOUS at 04:07

## 2023-07-16 RX ADMIN — PROPOFOL 50 MCG/KG/MIN: 10 INJECTION, EMULSION INTRAVENOUS at 10:07

## 2023-07-16 RX ADMIN — CEFTRIAXONE SODIUM 2 G: 2 INJECTION, POWDER, FOR SOLUTION INTRAMUSCULAR; INTRAVENOUS at 01:07

## 2023-07-16 RX ADMIN — OXYBUTYNIN CHLORIDE 5 MG: 5 TABLET ORAL at 08:07

## 2023-07-16 RX ADMIN — FENOFIBRATE 48 MG: 48 TABLET, FILM COATED ORAL at 08:07

## 2023-07-16 RX ADMIN — GUAIFENESIN 400 MG: 200 SOLUTION ORAL at 09:07

## 2023-07-16 RX ADMIN — ACETYLCYSTEINE 4 ML: 100 INHALANT RESPIRATORY (INHALATION) at 12:07

## 2023-07-16 RX ADMIN — ALBUTEROL SULFATE 2.5 MG: 2.5 SOLUTION RESPIRATORY (INHALATION) at 12:07

## 2023-07-16 RX ADMIN — CISATRACURIUM BESYLATE 2 MCG/KG/MIN: 10 INJECTION, SOLUTION INTRAVENOUS at 10:07

## 2023-07-16 RX ADMIN — WHITE PETROLATUM 57.7 %-MINERAL OIL 31.9 % EYE OINTMENT: at 06:07

## 2023-07-16 RX ADMIN — FENTANYL CITRATE 50 MCG: 50 INJECTION, SOLUTION INTRAMUSCULAR; INTRAVENOUS at 09:07

## 2023-07-16 RX ADMIN — FENTANYL CITRATE 50 MCG: 50 INJECTION, SOLUTION INTRAMUSCULAR; INTRAVENOUS at 02:07

## 2023-07-16 RX ADMIN — FAMOTIDINE 20 MG: 10 INJECTION, SOLUTION INTRAVENOUS at 08:07

## 2023-07-16 RX ADMIN — SENNOSIDES AND DOCUSATE SODIUM 2 TABLET: 50; 8.6 TABLET ORAL at 08:07

## 2023-07-16 RX ADMIN — DEXMEDETOMIDINE HYDROCHLORIDE 1.4 MCG/KG/HR: 400 INJECTION INTRAVENOUS at 10:07

## 2023-07-16 RX ADMIN — DEXMEDETOMIDINE HYDROCHLORIDE 1.4 MCG/KG/HR: 400 INJECTION INTRAVENOUS at 11:07

## 2023-07-16 RX ADMIN — DEXMEDETOMIDINE HYDROCHLORIDE 1.4 MCG/KG/HR: 400 INJECTION INTRAVENOUS at 07:07

## 2023-07-16 RX ADMIN — MICONAZOLE NITRATE 2 % TOPICAL POWDER: at 08:07

## 2023-07-16 RX ADMIN — ACETYLCYSTEINE 4 ML: 100 INHALANT RESPIRATORY (INHALATION) at 08:07

## 2023-07-16 RX ADMIN — PROPOFOL 50 MCG/KG/MIN: 10 INJECTION, EMULSION INTRAVENOUS at 02:07

## 2023-07-16 RX ADMIN — GUAIFENESIN 400 MG: 200 SOLUTION ORAL at 02:07

## 2023-07-16 RX ADMIN — CEFTRIAXONE SODIUM 2 G: 2 INJECTION, POWDER, FOR SOLUTION INTRAMUSCULAR; INTRAVENOUS at 11:07

## 2023-07-16 RX ADMIN — PROPOFOL 50 MCG/KG/MIN: 10 INJECTION, EMULSION INTRAVENOUS at 08:07

## 2023-07-16 RX ADMIN — OXYCODONE HYDROCHLORIDE AND ACETAMINOPHEN 1 TABLET: 10; 325 TABLET ORAL at 02:07

## 2023-07-16 RX ADMIN — CARVEDILOL 12.5 MG: 12.5 TABLET, FILM COATED ORAL at 08:07

## 2023-07-16 RX ADMIN — ALBUTEROL SULFATE 2.5 MG: 2.5 SOLUTION RESPIRATORY (INHALATION) at 08:07

## 2023-07-16 RX ADMIN — PROPOFOL 50 MCG/KG/MIN: 10 INJECTION, EMULSION INTRAVENOUS at 04:07

## 2023-07-16 RX ADMIN — DEXMEDETOMIDINE HYDROCHLORIDE 1.4 MCG/KG/HR: 400 INJECTION INTRAVENOUS at 08:07

## 2023-07-16 RX ADMIN — METHYLNALTREXONE BROMIDE 6.4 MG: 12 INJECTION, SOLUTION SUBCUTANEOUS at 08:07

## 2023-07-16 RX ADMIN — DOXYCYCLINE HYCLATE 100 MG: 100 TABLET, COATED ORAL at 08:07

## 2023-07-16 RX ADMIN — GUAIFENESIN 400 MG: 200 SOLUTION ORAL at 05:07

## 2023-07-16 RX ADMIN — GUAIFENESIN 400 MG: 200 SOLUTION ORAL at 06:07

## 2023-07-16 RX ADMIN — PROPOFOL 50 MCG/KG/MIN: 10 INJECTION, EMULSION INTRAVENOUS at 07:07

## 2023-07-16 RX ADMIN — MICONAZOLE NITRATE 2 % TOPICAL POWDER: at 10:07

## 2023-07-16 RX ADMIN — ATORVASTATIN CALCIUM 20 MG: 10 TABLET, FILM COATED ORAL at 08:07

## 2023-07-16 RX ADMIN — HYDRALAZINE HYDROCHLORIDE 10 MG: 20 INJECTION INTRAMUSCULAR; INTRAVENOUS at 09:07

## 2023-07-16 RX ADMIN — GUAIFENESIN 400 MG: 200 SOLUTION ORAL at 10:07

## 2023-07-16 RX ADMIN — AMLODIPINE BESYLATE 5 MG: 5 TABLET ORAL at 08:07

## 2023-07-16 RX ADMIN — DEXMEDETOMIDINE HYDROCHLORIDE 1.4 MCG/KG/HR: 400 INJECTION INTRAVENOUS at 02:07

## 2023-07-16 NOTE — PROCEDURES
[facility]  Bronchoscopy Procedure Note    SUMMARY     Date of Procedure: 7/16/2023    Procedure: Bronchoscopy, Therapeutic    Performed by: Osmin Townsend MD    Pre-Procedure Diagnosis:  Respiratory failure    Post-Procedure Diagnosis:  Left lower lobe atelectasis secondary to airway obstruction from blood clot    Anesthesia:  Patient already sedated and paralyzed on vent        Description of the Findings of the Procedure:     Consent: Emergent  The bronchocope was inserted into the main airway via the endotracheal tube. An anatomical survey was done of the main airways and the subsegmental bronchus.  There was a very large burden of blood clot present in the left lower lobe basilar segments creating obstruction of the airway.  These were drug out sequentially with the bronchoscope under suction.  Clot was somewhat difficult to break up and remove but after numerous passes this was achieved.  There was no evidence of active bleeding but some of the blood clot did appear to be quite fresh      Complications: None; patient tolerated the procedure well.    Estimated Blood Loss (EBL): Minimal           Specimens: None       Condition: Critical        Disposition: ICU - intubated and critically ill.    Osmin Townsend MD  [facility]

## 2023-07-16 NOTE — PROCEDURES
[facility]  Bronchoscopy Procedure Note    SUMMARY     Date of Procedure: 7/16/2023    Procedure: Bronchoscopy, Therapeutic    Performed by: Osmin Townsend MD    Pre-Procedure Diagnosis:  Respiratory failure    Post-Procedure Diagnosis:  Respiratory failure with blood clots in the left lower lobe    Anesthesia:  On propofol drip        Description of the Findings of the Procedure:     Consent was emergent.  The bronchocope was inserted into the main airway via the endotracheal tube. An anatomical survey was done of the main airways and the subsegmental bronchus.  There was a small amount of blood clot in the left lower lobe bronchus this appears to be maybe residual clot from before with perhaps a tiny bit of additional blood.  This was suctioned clear and the airway was patent.  Again no active bleeding appreciated.      Complications: None; patient tolerated the procedure well.    Estimated Blood Loss (EBL):  None           Specimens: None       Condition: Critical        Disposition: ICU - intubated and hemodynamically stable.    Osmin Townsend MD  [facility]

## 2023-07-16 NOTE — PLAN OF CARE
Problem: Adult Inpatient Plan of Care  Goal: Plan of Care Review  Outcome: Ongoing, Progressing  Goal: Patient-Specific Goal (Individualized)  Outcome: Ongoing, Progressing  Goal: Absence of Hospital-Acquired Illness or Injury  Outcome: Ongoing, Progressing  Goal: Optimal Comfort and Wellbeing  Outcome: Ongoing, Progressing     Problem: Diabetes Comorbidity  Goal: Blood Glucose Level Within Targeted Range  Outcome: Ongoing, Progressing     Problem: Skin Injury Risk Increased  Goal: Skin Health and Integrity  Outcome: Ongoing, Progressing     Problem: Impaired Wound Healing  Goal: Optimal Wound Healing  Outcome: Ongoing, Progressing     Problem: Pain Acute  Goal: Acceptable Pain Control and Functional Ability  Outcome: Ongoing, Progressing     Problem: Infection  Goal: Absence of Infection Signs and Symptoms  Outcome: Ongoing, Progressing

## 2023-07-16 NOTE — PROGRESS NOTES
[facility] - [facility] ICU  Pulmonary Critical Care Note    Patient Name: Bianca Khan  MRN: [id number]  Admission Date: 6/28/2023  Hospital Length of Stay: 18 days  Code Status: Full Code  Attending Provider: Khris Treadwell Jr., MD, *  Primary Care Provider: Areli Vargas PA-C     Subjective:     HPI:   Bianca Khan is a 58 y.o. male with PMH of paraplegia 2/2 spinal cord injury (T1-T6), neurogenic bladder with suprapubic catheter, chronic right ankle osteomyelitis, DM II, HTN, who presented to the ED on 6/28/2023 with CC of right knee pain. Per chart review, CT of right knee revealed 5 cm area of subcutaneous fluid in prepatellar region which was aspirated in the ED; he was taken to the OR on 6/29/2023 by orthopedic surgery team and was found to have prepatellar bursa infection and septic arthritis. Wound culture 6/29/2023 positive for strep agalactiae. Orthopedic surgery team recommended right-sided above-knee amputation d/t chronically infected hardware in right lower extremity. On 7/4/2023, a RRT was called d/t acute respiratory distress that was not improving despite being placed on vapotherm at 35 L/min with FiO2 100%. At the time of examination, patient's initial GCS was 10 but progressively reduced to a GCS of 6. Shared decision with patient's wife was made to intubate patient for airway protection though ABGs were within normal limits. He is admitted to the ICU for close monitoring and further medical management.    Hospital Course/Significant events:  6/29/2023 - I&D of right knee  7/4/2023 - Admitted to ICU, intubated for airway protection d/t altered mental status. Trialysis catheter placed and HD began       24 Hour Interval History:  No acute events overnight.  Vital signs remained stable overnight. CBC shows an H&H of 8.6 and 26.2 respectively.  CMP is stable as well.  BUN and creatinine is 69.4 and 2.72.  Chest x-ray shows atelectasis in the left lobe.  Will plan for a bronch.   Will continue Rocephin.  Continues to have wound VAC to the right knee which has drained a total of 300 mL.  Ventilatory settings are 60 FiO2, 8 of PEEP, and respiratory rate of 20.  Tidal volume is 450.     Past Medical History:   Diagnosis Date    Arthritis     Chronic ulcer of ankle 05/26/2022    Frequent UTI 07/02/2019    Generalized anxiety disorder 05/26/2022    Neurogenic bladder 05/26/2022    Osteomyelitis 05/26/2022    Presence of suprapubic catheter 05/26/2022    Pure hypercholesterolemia 05/26/2022    Retention of urine, unspecified 08/09/2019    Spinal cord injury at T1-T6 level 04/20/2018       Past Surgical History:   Procedure Laterality Date    INCISION AND DRAINAGE, LOWER EXTREMITY Right 6/29/2023    Procedure: INCISION AND DRAINAGE, LOWER EXTREMITY;  Surgeon: Prabhu Shen DO;  Location: [facility];  Service: Orthopedics;  Laterality: Right;  supine bone foam wash stuff cultures    INSERTION OF INTRAMEDULLARY MARISA Right 8/10/2022    Procedure: INSERTION, INTRAMEDULLARY MARISA RIGHT TIBIA;  Surgeon: Jorge Orellana MD;  Location: [facility];  Service: Orthopedics;  Laterality: Right;       Social History     Socioeconomic History    Marital status:    Tobacco Use    Smoking status: Every Day     Types: Cigars, Cigarettes    Smokeless tobacco: Never   Substance and Sexual Activity    Alcohol use: Yes     Alcohol/week: 2.0 standard drinks     Types: 2 Cans of beer per week    Drug use: Not Currently    Sexual activity: Never       Current Outpatient Medications   Medication Instructions    amitriptyline (ELAVIL) 50 mg, Oral, Nightly    amLODIPine (NORVASC) 10 MG tablet 1 tablet    atorvastatin (LIPITOR) 20 mg, Oral, Nightly    busPIRone (BUSPAR) 5 MG Tab 1 tablet    carvediloL (COREG) 12.5 mg, Oral    cyclobenzaprine (FLEXERIL) 10 mg, Oral, 2 times daily PRN    doxycycline (VIBRAMYCIN) 100 mg, Oral, Daily    fenofibrate 160 mg, Oral    FLUoxetine 20 mg, Oral    gabapentin (NEURONTIN) 300 MG capsule 1  capsule    lisinopriL 10 mg, Oral, Daily    LORazepam (ATIVAN) 1 mg, Oral, 2 times daily    melatonin (MELATIN) 3 mg tablet TAKE TWO TABLETS BY MOUTH EVERY NIGHT AT BEDTIME AS NEEDED FOR INSOMNIA.    meloxicam (MOBIC) 15 mg, Oral, Daily    metFORMIN (GLUCOPHAGE) 500 MG tablet SMARTSI Tablet(s) By Mouth Morning-Evening    oxybutynin (DITROPAN) 5 mg, Oral, 2 times daily    oxyCODONE-acetaminophen (PERCOCET)  mg per tablet 1 tablet, Oral, Every 6 hours PRN    pantoprazole (PROTONIX) 40 MG tablet 1 tablet    temazepam (RESTORIL) 30 mg, Oral, Nightly    traZODone (DESYREL) 50 mg, Oral, Nightly    XARELTO 20 mg Tab SMARTSI Tablet(s) By Mouth Every Evening     Current Inpatient Medications   acetylcysteine 100 mg/ml (10%)  4 mL Nebulization Q6H    albuterol sulfate  2.5 mg Nebulization Q6H    amLODIPine  5 mg Per NG tube Daily    atorvastatin  20 mg Per NG tube Nightly    carvediloL  12.5 mg Per NG tube BID    cefTRIAXone (ROCEPHIN) IVPB  2 g Intravenous Q24H    doxycycline  100 mg Per NG tube Q12H    enoxaparin  30 mg Subcutaneous Daily    epoetin franklin  20,000 Units Subcutaneous Every Mon, Wed, Fri    famotidine (PF)  20 mg Intravenous Daily    fenofibrate  48 mg Per NG tube Daily    guaiFENesin 100 mg/5 ml  400 mg Per NG tube Q4H    miconazole NITRATE 2 %   Topical (Top) BID    oxybutynin  5 mg Per NG tube BID    senna-docusate 8.6-50 mg  2 tablet Per NG tube BID    sodium citrate-citric acid 500-334 mg/5 ml  30 mL Oral Once    white petrolatum-mineral oiL   Both Eyes Q8H     Current Intravenous Infusions   cisatracurium (NIMBEX) infusion 2.5 mcg/kg/min (23 0500)    dexmedeTOMIDine (Precedex) infusion (titrating) 1.4 mcg/kg/hr (23)    NORepinephrine bitartrate-D5W Stopped (23 1039)    propofoL 50 mcg/kg/min (23 07)       Objective:     Intake/Output Summary (Last 24 hours) at 2023 0909  Last data filed at 2023 0733  Gross per 24 hour   Intake 1053 ml   Output 3260 ml    Net -2207 ml       Vital Signs (Most Recent):  Temp: 98.4 °F (36.9 °C) (07/16/23 0400)  Pulse: 62 (07/16/23 0900)  Resp: 20 (07/16/23 0907)  BP: (!) 167/90 (07/16/23 0900)  SpO2: 98 % (07/16/23 0900)  Body mass index is 27.51 kg/m².  Weight: 86.2 kg (190 lb) Vital Signs (24h Range):  Temp:  [98.2 °F (36.8 °C)-98.6 °F (37 °C)] 98.4 °F (36.9 °C)  Pulse:  [] 62  Resp:  [13-28] 20  SpO2:  [88 %-100 %] 98 %  BP: ()/(54-99) 167/90     Physical Exam  Vitals and nursing note reviewed.   Constitutional:       Interventions: He is sedated and intubated.   HENT:      Head: Normocephalic and atraumatic.      Nose: Nose normal.   Cardiovascular:      Rate and Rhythm: Normal rate and regular rhythm.      Pulses: Normal pulses.      Heart sounds: Normal heart sounds.   Pulmonary:      Effort: He is intubated.      Comments: Coarse lung sounds heard throughout the anterior chest.   Abdominal:      General: Bowel sounds are normal.      Palpations: Abdomen is soft.   Musculoskeletal:      Cervical back: Neck supple.      Comments: Wound VAC in place to the right knee draining bloody fluid with a total of 300 mL   Skin:     General: Skin is warm and dry.   Neurological:      Comments: Unable to assess 2/2 patient sedation status at the time of exam   Psychiatric:      Comments: Unable to assess 2/2 patient sedation status at the time of exam       Lines/Drains/Airways       Central Venous Catheter Line  Duration                  Hemodialysis Catheter 07/14/23 1329 left internal jugular 1 day              Drain  Duration                  Suprapubic Catheter 07/06/23 1544 100% silicone 22 Fr. 9 days         NG/OG Tube 07/11/23 1425 Right nostril 4 days              Airway  Duration                  Airway - Non-Surgical 07/11/23 1420 Endotracheal Tube 4 days              Peripheral Intravenous Line  Duration                  Midline Catheter Insertion/Assessment  - Single Lumen 07/02/23 2128 Right basilic vein (medial  side of arm) 13 days         Peripheral IV - Single Lumen 07/04/23 0600 18 G Anterior;Left Forearm 12 days         Peripheral IV - Single Lumen 07/04/23 0600 20 G Left;Posterior Hand 12 days                  Significant Labs:  Lab Results   Component Value Date    WBC 9.35 07/16/2023    HGB 8.6 (L) 07/16/2023    HCT 26.2 (L) 07/16/2023    MCV 75.9 (L) 07/16/2023     (H) 07/16/2023       BMP  Lab Results   Component Value Date     (L) 07/16/2023    K 4.0 07/16/2023    CHLORIDE 100 07/16/2023    CO2 24 07/16/2023    BUN 69.4 (H) 07/16/2023    CREATININE 2.72 (H) 07/16/2023    CALCIUM 8.8 07/16/2023    AGAP 9.0 08/29/2022    ESTGFRAFRICA 101 05/11/2022    EGFRNONAA >60 04/27/2022     ABG  Recent Labs   Lab 07/16/23 0647   PH 7.330*   PO2 92.0   HCO3 27.9*       Mechanical Ventilation Support:  Vent Mode: SIMV (07/16/23 0818)  Ventilator Initiated: Yes (07/11/23 1420)  Set Rate: 20 BPM (07/16/23 0818)  Vt Set: 450 mL (07/16/23 0818)  Pressure Support: 10 cmH20 (07/16/23 0818)  PEEP/CPAP: 8 cmH20 (07/16/23 0818)  Oxygen Concentration (%): 60 (07/16/23 0822)  Peak Airway Pressure: 24 cmH20 (07/16/23 0818)  Total Ve: 6.9 L/m (07/16/23 0818)  F/VT Ratio<105 (RSBI): (!) 58.82 (07/16/23 0818)    Significant Imaging:  I have reviewed the pertinent imaging within the past 24 hours.    Assessment/Plan:     Assessment  Extensive bilateral pulmonary infiltrates  DX:  Infectious pneumonia versus ARDS versus hydrostatic/non hydrostatic pulmonary edema   Sputum culture showed moderate yeast  Intubated 07/04/2023  Prepatellar bursitis/septic arthritis of the right knee (strep agalactiae)   Post I&D on 06/29/2023   Ortho no longer planning for right above-the-knee amputation in near future due to infectious suppression  Acute renal failure, oliguric  Initiation of hemodialysis on 07/04/2023  Acute encephalopathy, likely metabolic related to above, currently clouded by sedation   Reported paroxysmal atrial fibrillation  with permenant pacemaker, currently in sinus rhythm   H/o chronic paraplegia at T1 2/2 spinal cordy injury, neurogenic bladder, chronic right ankle osteomyelitis, DM2, HTN    Plan  -Continue ICU level of care for ongoing monitoring and medical management  -ID, urology, orthopedics, wound care teams following, appreciate their assistance   -HD/Ultrafiltration as per nephrology, 2.5L taken off yesterday  -per ID Merrem discontinued and switched to ceftriaxone with end date of 08/09 and continuing suppressive doxycycline with further plan of 6 week course for GBS septic arthritis and following inflammatory markers; repeat cultures (7/9/23) NGTD.  -titrate mechanical ventilation for ARDS net protocol.  -Will trial methylnaltrexone for constipation, failed lactulose, only small amount of output over course of past few days with belly distension  -Pending Spontaneous Breathing Trial  -will plan for bronchoscopy due to x-ray changes       DVT Prophylaxis: LVX 30   GI Prophylaxis: Pepcid 20     32 minutes of critical care was time spent personally by me on the following activities: development of treatment plan with patient or surrogate and bedside caregivers, discussions with consultants, evaluation of patient's response to treatment, examination of patient, ordering and performing treatments and interventions, ordering and review of laboratory studies, ordering and review of radiographic studies, pulse oximetry, re-evaluation of patient's condition.  This critical care time did not overlap with that of any other provider or involve time for any procedures.    Favian Quiroz MD  Pulmonary Critical Care Medicine  [facility] - [facility] ICU

## 2023-07-16 NOTE — PROGRESS NOTES
Chief complaint: N/A    Interval History:  Pt remains intubated, required bronch for removal of obstructing blood clots this morning    Review of Systems   Unable to perform ROS: Intubated    all else Neg     acetylcysteine 100 mg/ml (10%)  4 mL Nebulization Q6H    albuterol sulfate  2.5 mg Nebulization Q6H    amLODIPine  5 mg Per NG tube Daily    atorvastatin  20 mg Per NG tube Nightly    carvediloL  12.5 mg Per NG tube BID    cefTRIAXone (ROCEPHIN) IVPB  2 g Intravenous Q24H    doxycycline  100 mg Per NG tube Q12H    enoxaparin  30 mg Subcutaneous Daily    epoetin franklin  20,000 Units Subcutaneous Every Mon, Wed, Fri    famotidine (PF)  20 mg Intravenous Daily    fenofibrate  48 mg Per NG tube Daily    guaiFENesin 100 mg/5 ml  400 mg Per NG tube Q4H    miconazole NITRATE 2 %   Topical (Top) BID    oxybutynin  5 mg Per NG tube BID    senna-docusate 8.6-50 mg  2 tablet Per NG tube BID    sodium citrate-citric acid 500-334 mg/5 ml  30 mL Oral Once    white petrolatum-mineral oiL   Both Eyes Q8H       Objective     VITAL SIGNS: 24 HR MIN & MAX LAST    Temp  Min: 98.2 °F (36.8 °C)  Max: 98.6 °F (37 °C)  98.4 °F (36.9 °C)    BP  Min: 83/58  Max: 178/100  120/65     Pulse  Min: 60  Max: 112  92     Resp  Min: 13  Max: 25  20    SpO2  Min: 88 %  Max: 100 %  100 %      Wt Readings from Last 3 Encounters:   07/04/23 86.2 kg (190 lb)   09/13/22 86.2 kg (190 lb)   08/25/22 86.2 kg (190 lb 0.6 oz)       Intake/Output Summary (Last 24 hours) at 7/16/2023 1210  Last data filed at 7/16/2023 1031  Gross per 24 hour   Intake 1053 ml   Output 3410 ml   Net -2357 ml       Physical Exam  Constitutional:       General: He is not in acute distress.     Appearance: He is ill-appearing.   HENT:      Mouth/Throat:      Comments: Intubated on vent, receiving TF's  Cardiovascular:      Rate and Rhythm: Normal rate.   Pulmonary:      Comments: Mechanically ventilated BS  Abdominal:      General: Bowel sounds are normal. There is no  distension.      Palpations: Abdomen is soft.   Genitourinary:     Comments: Stover yellow Uout  Musculoskeletal:         General: No swelling.   Neurological:      Comments: sedated        Recent Labs     07/14/23  0111 07/15/23  0145 07/16/23 0148   * 134* 135*   K 3.8 4.2 4.0   CHLORIDE 96* 98 100   CO2 22 22 24   .2* 64.5* 69.4*   CREATININE 4.09* 2.63* 2.72*   GLUCOSE 118* 121* 141*   CALCIUM 8.7 8.5 8.8   MG  --  1.90  --    PHOS 5.9* 5.2* 5.7*   ALBUMIN 1.9* 2.0* 2.5*      Recent Labs     07/14/23  0744 07/16/23 0148   WBC 11.38 9.35   HGB 9.6* 8.6*   HCT 29.0* 26.2*   * 542*         Assessment & Plan     1   ANTON / ATN - nonoliguric, s/p HD 7/14 with 3 L UF, 1.8 L Uout, had HD again yesterday with additional 2 L UF, 1.4 L Uout with markedly improved volume status, plan HD again tomorrow  2   CKD unspecified? Baseline creatinine 1.0 in July 2022  3   Resp failure, pneumonitis - He tolerated UF, diuresis, VSS, required bronch for clot removal this morning.  He remains on rocephin, doxycycline  4   SHPT - phos 5.7 p HD yesterday,  on admit, monitor for now    5   paraplegia, neurogenic bladder with suprapubic cath   6   Anemia - on EPO, tolerating sig fluid removal, though Hb down to 8.6 from acute blood loss, monitor for transfusion needs  7   HTN - normotensive, will d/c amlodipine for now  8   DM2  9   A Fib  10  Right knee infection

## 2023-07-17 LAB
ALBUMIN SERPL-MCNC: 2.3 G/DL (ref 3.5–5)
ALLENS TEST BLOOD GAS (OHS): YES
BACTERIA SPEC CULT: ABNORMAL
BASE EXCESS BLD CALC-SCNC: 1.1 MMOL/L
BASOPHILS # BLD AUTO: 0.04 X10(3)/MCL
BASOPHILS NFR BLD AUTO: 0.4 %
BLOOD GAS SAMPLE TYPE (OHS): ABNORMAL
BUN SERPL-MCNC: 94.2 MG/DL (ref 8.4–25.7)
CA-I BLD-SCNC: 1.24 MMOL/L (ref 1.12–1.23)
CALCIUM SERPL-MCNC: 8.9 MG/DL (ref 8.4–10.2)
CHLORIDE SERPL-SCNC: 101 MMOL/L (ref 98–107)
CO2 BLDA-SCNC: 28 MMOL/L
CO2 SERPL-SCNC: 22 MMOL/L (ref 22–29)
CREAT SERPL-MCNC: 3.22 MG/DL (ref 0.73–1.18)
DRAWN BY BLOOD GAS (OHS): ABNORMAL
EOSINOPHIL # BLD AUTO: 0.19 X10(3)/MCL (ref 0–0.9)
EOSINOPHIL NFR BLD AUTO: 2 %
ERYTHROCYTE [DISTWIDTH] IN BLOOD BY AUTOMATED COUNT: 21 % (ref 11.5–17)
FIO2 BLOOD GAS (OHS): 30 %
GFR SERPLBLD CREATININE-BSD FMLA CKD-EPI: 21 MLS/MIN/1.73/M2
GLUCOSE SERPL-MCNC: 120 MG/DL (ref 74–100)
GRAM STN SPEC: ABNORMAL
GRAM STN SPEC: ABNORMAL
HCO3 BLDA-SCNC: 26.6 MMOL/L
HCT VFR BLD AUTO: 26.5 % (ref 42–52)
HGB BLD-MCNC: 8.8 G/DL (ref 14–18)
IMM GRANULOCYTES # BLD AUTO: 0.05 X10(3)/MCL (ref 0–0.04)
IMM GRANULOCYTES NFR BLD AUTO: 0.5 %
LYMPHOCYTES # BLD AUTO: 1.97 X10(3)/MCL (ref 0.6–4.6)
LYMPHOCYTES NFR BLD AUTO: 20.4 %
MCH RBC QN AUTO: 25.1 PG (ref 27–31)
MCHC RBC AUTO-ENTMCNC: 33.2 G/DL (ref 33–36)
MCV RBC AUTO: 75.7 FL (ref 80–94)
MECH RR (OHS): 20 B/MIN
MECH VT (OHS): 450 ML
MODE (OHS): ABNORMAL
MONOCYTES # BLD AUTO: 1.3 X10(3)/MCL (ref 0.1–1.3)
MONOCYTES NFR BLD AUTO: 13.4 %
NEUTROPHILS # BLD AUTO: 6.12 X10(3)/MCL (ref 2.1–9.2)
NEUTROPHILS NFR BLD AUTO: 63.3 %
NRBC BLD AUTO-RTO: 0 %
OXYGEN DEVICE BLOOD GAS (OHS): ABNORMAL
PCO2 BLDA: 45 MMHG (ref 35–45)
PEEP (OHS): 8 CMH2O
PH BLDA: 7.38 [PH] (ref 7.35–7.45)
PHOSPHATE SERPL-MCNC: 7.2 MG/DL (ref 2.3–4.7)
PLATELET # BLD AUTO: 655 X10(3)/MCL (ref 130–400)
PMV BLD AUTO: 10.2 FL (ref 7.4–10.4)
PO2 BLDA: 81 MMHG (ref 80–100)
POTASSIUM BLOOD GAS (OHS): 3.5 MMOL/L (ref 3.5–5)
POTASSIUM SERPL-SCNC: 4.3 MMOL/L (ref 3.5–5.1)
PS (OHS): 10 CMH2O
RBC # BLD AUTO: 3.5 X10(6)/MCL (ref 4.7–6.1)
SAMPLE SITE BLOOD GAS (OHS): ABNORMAL
SAO2 % BLDA: 96 %
SODIUM BLOOD GAS (OHS): 134 MMOL/L (ref 137–145)
SODIUM SERPL-SCNC: 137 MMOL/L (ref 136–145)
WBC # SPEC AUTO: 9.67 X10(3)/MCL (ref 4.5–11.5)

## 2023-07-17 PROCEDURE — 63600175 PHARM REV CODE 636 W HCPCS: Performed by: INTERNAL MEDICINE

## 2023-07-17 PROCEDURE — 63600175 PHARM REV CODE 636 W HCPCS: Performed by: STUDENT IN AN ORGANIZED HEALTH CARE EDUCATION/TRAINING PROGRAM

## 2023-07-17 PROCEDURE — 27200966 HC CLOSED SUCTION SYSTEM

## 2023-07-17 PROCEDURE — 94003 VENT MGMT INPAT SUBQ DAY: CPT

## 2023-07-17 PROCEDURE — 85025 COMPLETE CBC W/AUTO DIFF WBC: CPT | Performed by: INTERNAL MEDICINE

## 2023-07-17 PROCEDURE — 99900031 HC PATIENT EDUCATION (STAT)

## 2023-07-17 PROCEDURE — 63600175 PHARM REV CODE 636 W HCPCS

## 2023-07-17 PROCEDURE — 25000003 PHARM REV CODE 250: Performed by: INTERNAL MEDICINE

## 2023-07-17 PROCEDURE — 80069 RENAL FUNCTION PANEL: CPT | Performed by: INTERNAL MEDICINE

## 2023-07-17 PROCEDURE — 36600 WITHDRAWAL OF ARTERIAL BLOOD: CPT

## 2023-07-17 PROCEDURE — 80100014 HC HEMODIALYSIS 1:1

## 2023-07-17 PROCEDURE — 25000003 PHARM REV CODE 250: Performed by: STUDENT IN AN ORGANIZED HEALTH CARE EDUCATION/TRAINING PROGRAM

## 2023-07-17 PROCEDURE — 94761 N-INVAS EAR/PLS OXIMETRY MLT: CPT

## 2023-07-17 PROCEDURE — 25000003 PHARM REV CODE 250

## 2023-07-17 PROCEDURE — 63600175 PHARM REV CODE 636 W HCPCS: Mod: JZ,EC,JG | Performed by: INTERNAL MEDICINE

## 2023-07-17 PROCEDURE — 63600175 PHARM REV CODE 636 W HCPCS: Mod: JZ,JG | Performed by: INTERNAL MEDICINE

## 2023-07-17 PROCEDURE — 20000000 HC ICU ROOM

## 2023-07-17 PROCEDURE — 99900035 HC TECH TIME PER 15 MIN (STAT)

## 2023-07-17 PROCEDURE — 99900026 HC AIRWAY MAINTENANCE (STAT)

## 2023-07-17 PROCEDURE — 25000242 PHARM REV CODE 250 ALT 637 W/ HCPCS

## 2023-07-17 PROCEDURE — 27000221 HC OXYGEN, UP TO 24 HOURS

## 2023-07-17 PROCEDURE — 94640 AIRWAY INHALATION TREATMENT: CPT

## 2023-07-17 PROCEDURE — 82803 BLOOD GASES ANY COMBINATION: CPT

## 2023-07-17 PROCEDURE — P9047 ALBUMIN (HUMAN), 25%, 50ML: HCPCS | Mod: JZ,JG | Performed by: INTERNAL MEDICINE

## 2023-07-17 RX ORDER — ALBUMIN HUMAN 250 G/1000ML
25 SOLUTION INTRAVENOUS ONCE
Status: COMPLETED | OUTPATIENT
Start: 2023-07-17 | End: 2023-07-17

## 2023-07-17 RX ADMIN — PROPOFOL 50 MCG/KG/MIN: 10 INJECTION, EMULSION INTRAVENOUS at 11:07

## 2023-07-17 RX ADMIN — ACETYLCYSTEINE 4 ML: 100 INHALANT RESPIRATORY (INHALATION) at 02:07

## 2023-07-17 RX ADMIN — FENTANYL CITRATE 50 MCG: 50 INJECTION, SOLUTION INTRAMUSCULAR; INTRAVENOUS at 03:07

## 2023-07-17 RX ADMIN — PROPOFOL 50 MCG/KG/MIN: 10 INJECTION, EMULSION INTRAVENOUS at 05:07

## 2023-07-17 RX ADMIN — OXYCODONE HYDROCHLORIDE AND ACETAMINOPHEN 1 TABLET: 10; 325 TABLET ORAL at 05:07

## 2023-07-17 RX ADMIN — PROPOFOL 50 MCG/KG/MIN: 10 INJECTION, EMULSION INTRAVENOUS at 08:07

## 2023-07-17 RX ADMIN — ALBUTEROL SULFATE 2.5 MG: 2.5 SOLUTION RESPIRATORY (INHALATION) at 02:07

## 2023-07-17 RX ADMIN — HYDRALAZINE HYDROCHLORIDE 10 MG: 20 INJECTION INTRAMUSCULAR; INTRAVENOUS at 03:07

## 2023-07-17 RX ADMIN — DEXMEDETOMIDINE HYDROCHLORIDE 1.4 MCG/KG/HR: 400 INJECTION INTRAVENOUS at 10:07

## 2023-07-17 RX ADMIN — MICONAZOLE NITRATE 2 % TOPICAL POWDER: at 08:07

## 2023-07-17 RX ADMIN — DEXMEDETOMIDINE HYDROCHLORIDE 1.4 MCG/KG/HR: 400 INJECTION INTRAVENOUS at 07:07

## 2023-07-17 RX ADMIN — GUAIFENESIN 400 MG: 200 SOLUTION ORAL at 05:07

## 2023-07-17 RX ADMIN — GUAIFENESIN 400 MG: 200 SOLUTION ORAL at 01:07

## 2023-07-17 RX ADMIN — DEXMEDETOMIDINE HYDROCHLORIDE 1.4 MCG/KG/HR: 400 INJECTION INTRAVENOUS at 11:07

## 2023-07-17 RX ADMIN — PROPOFOL 50 MCG/KG/MIN: 10 INJECTION, EMULSION INTRAVENOUS at 02:07

## 2023-07-17 RX ADMIN — OXYBUTYNIN CHLORIDE 5 MG: 5 TABLET ORAL at 08:07

## 2023-07-17 RX ADMIN — OXYCODONE HYDROCHLORIDE AND ACETAMINOPHEN 1 TABLET: 10; 325 TABLET ORAL at 01:07

## 2023-07-17 RX ADMIN — CARVEDILOL 12.5 MG: 12.5 TABLET, FILM COATED ORAL at 08:07

## 2023-07-17 RX ADMIN — GUAIFENESIN 400 MG: 200 SOLUTION ORAL at 10:07

## 2023-07-17 RX ADMIN — ENOXAPARIN SODIUM 30 MG: 30 INJECTION SUBCUTANEOUS at 04:07

## 2023-07-17 RX ADMIN — ACETYLCYSTEINE 4 ML: 100 INHALANT RESPIRATORY (INHALATION) at 07:07

## 2023-07-17 RX ADMIN — ALBUTEROL SULFATE 2.5 MG: 2.5 SOLUTION RESPIRATORY (INHALATION) at 07:07

## 2023-07-17 RX ADMIN — ATORVASTATIN CALCIUM 20 MG: 10 TABLET, FILM COATED ORAL at 08:07

## 2023-07-17 RX ADMIN — ERYTHROPOIETIN 20000 UNITS: 10000 INJECTION, SOLUTION INTRAVENOUS; SUBCUTANEOUS at 09:07

## 2023-07-17 RX ADMIN — ALBUTEROL SULFATE 2.5 MG: 2.5 SOLUTION RESPIRATORY (INHALATION) at 08:07

## 2023-07-17 RX ADMIN — ALBUTEROL SULFATE 2.5 MG: 2.5 SOLUTION RESPIRATORY (INHALATION) at 12:07

## 2023-07-17 RX ADMIN — PROPOFOL 50 MCG/KG/MIN: 10 INJECTION, EMULSION INTRAVENOUS at 01:07

## 2023-07-17 RX ADMIN — ALBUMIN (HUMAN) 25 G: 12.5 SOLUTION INTRAVENOUS at 11:07

## 2023-07-17 RX ADMIN — DOXYCYCLINE HYCLATE 100 MG: 100 TABLET, COATED ORAL at 08:07

## 2023-07-17 RX ADMIN — SENNOSIDES AND DOCUSATE SODIUM 2 TABLET: 50; 8.6 TABLET ORAL at 08:07

## 2023-07-17 RX ADMIN — FENOFIBRATE 48 MG: 48 TABLET, FILM COATED ORAL at 08:07

## 2023-07-17 RX ADMIN — FAMOTIDINE 20 MG: 10 INJECTION, SOLUTION INTRAVENOUS at 08:07

## 2023-07-17 RX ADMIN — PROPOFOL 50 MCG/KG/MIN: 10 INJECTION, EMULSION INTRAVENOUS at 07:07

## 2023-07-17 RX ADMIN — ACETYLCYSTEINE 4 ML: 100 INHALANT RESPIRATORY (INHALATION) at 08:07

## 2023-07-17 RX ADMIN — CEFTRIAXONE SODIUM 2 G: 2 INJECTION, POWDER, FOR SOLUTION INTRAMUSCULAR; INTRAVENOUS at 11:07

## 2023-07-17 RX ADMIN — ACETYLCYSTEINE 4 ML: 100 INHALANT RESPIRATORY (INHALATION) at 12:07

## 2023-07-17 RX ADMIN — DEXMEDETOMIDINE HYDROCHLORIDE 1.4 MCG/KG/HR: 400 INJECTION INTRAVENOUS at 03:07

## 2023-07-17 RX ADMIN — DEXMEDETOMIDINE HYDROCHLORIDE 1.4 MCG/KG/HR: 400 INJECTION INTRAVENOUS at 02:07

## 2023-07-17 NOTE — NURSING
07/17/23 1150   Post-Hemodialysis Assessment   Blood Volume Processed (Liters) 62.2 L   Dialyzer Clearance Lightly streaked   Duration of Treatment 210 minutes   Total UF (mL) 3000 mL   Patient Response to Treatment dialysis catheter positional, only able to acheive BFR of 300, otherwise pt tolerated well with 25 gm 25% albumin given in last hour of treatment to prevent hypotension, pt required an extra setup due to clotted venous chamber after about 1.5 hours of treatment

## 2023-07-17 NOTE — PLAN OF CARE
Problem: Infection  Goal: Absence of Infection Signs and Symptoms  Outcome: Ongoing, Progressing     Problem: Communication Impairment (Mechanical Ventilation, Invasive)  Goal: Effective Communication  Outcome: Ongoing, Progressing     Problem: Device-Related Complication Risk (Mechanical Ventilation, Invasive)  Goal: Optimal Device Function  Outcome: Ongoing, Progressing     Problem: Nutrition Impairment (Mechanical Ventilation, Invasive)  Goal: Optimal Nutrition Delivery  Outcome: Ongoing, Not Progressing

## 2023-07-17 NOTE — PROGRESS NOTES
[facility] - [facility] ICU  Pulmonary Critical Care Note    Patient Name: Bianca Khan  MRN: [id number]  Admission Date: 6/28/2023  Hospital Length of Stay: 19 days  Code Status: Full Code  Attending Provider: Khris Treadwell Jr., MD, *  Primary Care Provider: Areli Vargas PA-C     Subjective:     HPI:   Bianca Khan is a 58 y.o. male with PMH of paraplegia 2/2 spinal cord injury (T1-T6), neurogenic bladder with suprapubic catheter, chronic right ankle osteomyelitis, DM II, HTN, who presented to the ED on 6/28/2023 with CC of right knee pain. Per chart review, CT of right knee revealed 5 cm area of subcutaneous fluid in prepatellar region which was aspirated in the ED; he was taken to the OR on 6/29/2023 by orthopedic surgery team and was found to have prepatellar bursa infection and septic arthritis. Wound culture 6/29/2023 positive for strep agalactiae. Orthopedic surgery team recommended right-sided above-knee amputation d/t chronically infected hardware in right lower extremity. On 7/4/2023, a RRT was called d/t acute respiratory distress that was not improving despite being placed on vapotherm at 35 L/min with FiO2 100%. At the time of examination, patient's initial GCS was 10 but progressively reduced to a GCS of 6. Shared decision with patient's wife was made to intubate patient for airway protection though ABGs were within normal limits. He is admitted to the ICU for close monitoring and further medical management.    Hospital Course/Significant events:  6/29/2023 - I&D of right knee  7/4/2023 - Admitted to ICU, intubated for airway protection d/t altered mental status. Trialysis catheter placed and HD began       24 Hour Interval History:  No acute events overnight.  Vital signs remained stable overnight. CBC shows an H&H of 8.8 and 26.5 respectively.  CMP is stable as well.  BUN and creatinine is 94.2 and 3.22.  Chest x-ray shows atelectasis in the left lobe.  Bronchoscopy yesterday  showed very large burden of blood clot present in the left lower lobe creating obstruction of the airway which was removed.  Will continue Rocephin.  Continues to have wound VAC to the right knee which has drained a total of 300 mL.  Ventilatory settings are being weaned.  Currently on 30 FiO2, 8 of PEEP, 20 respiratory rate, and 450 tidal volume.  Continues to be sedated on propofol and Precedex.  Nimbex discontinued.  Will get new ABG to assess respiratory status.    Past Medical History:   Diagnosis Date    Arthritis     Chronic ulcer of ankle 05/26/2022    Frequent UTI 07/02/2019    Generalized anxiety disorder 05/26/2022    Neurogenic bladder 05/26/2022    Osteomyelitis 05/26/2022    Presence of suprapubic catheter 05/26/2022    Pure hypercholesterolemia 05/26/2022    Retention of urine, unspecified 08/09/2019    Spinal cord injury at T1-T6 level 04/20/2018       Past Surgical History:   Procedure Laterality Date    INCISION AND DRAINAGE, LOWER EXTREMITY Right 6/29/2023    Procedure: INCISION AND DRAINAGE, LOWER EXTREMITY;  Surgeon: Prabhu Shen DO;  Location: [facility];  Service: Orthopedics;  Laterality: Right;  supine bone foam wash stuff cultures    INSERTION OF INTRAMEDULLARY MARISA Right 8/10/2022    Procedure: INSERTION, INTRAMEDULLARY MARISA RIGHT TIBIA;  Surgeon: Jorge Orellana MD;  Location: [facility];  Service: Orthopedics;  Laterality: Right;       Social History     Socioeconomic History    Marital status:    Tobacco Use    Smoking status: Every Day     Types: Cigars, Cigarettes    Smokeless tobacco: Never   Substance and Sexual Activity    Alcohol use: Yes     Alcohol/week: 2.0 standard drinks     Types: 2 Cans of beer per week    Drug use: Not Currently    Sexual activity: Never       Current Outpatient Medications   Medication Instructions    amitriptyline (ELAVIL) 50 mg, Oral, Nightly    amLODIPine (NORVASC) 10 MG tablet 1 tablet    atorvastatin (LIPITOR) 20 mg, Oral, Nightly    busPIRone  (BUSPAR) 5 MG Tab 1 tablet    carvediloL (COREG) 12.5 mg, Oral    cyclobenzaprine (FLEXERIL) 10 mg, Oral, 2 times daily PRN    doxycycline (VIBRAMYCIN) 100 mg, Oral, Daily    fenofibrate 160 mg, Oral    FLUoxetine 20 mg, Oral    gabapentin (NEURONTIN) 300 MG capsule 1 capsule    lisinopriL 10 mg, Oral, Daily    LORazepam (ATIVAN) 1 mg, Oral, 2 times daily    melatonin (MELATIN) 3 mg tablet TAKE TWO TABLETS BY MOUTH EVERY NIGHT AT BEDTIME AS NEEDED FOR INSOMNIA.    meloxicam (MOBIC) 15 mg, Oral, Daily    metFORMIN (GLUCOPHAGE) 500 MG tablet SMARTSI Tablet(s) By Mouth Morning-Evening    oxybutynin (DITROPAN) 5 mg, Oral, 2 times daily    oxyCODONE-acetaminophen (PERCOCET)  mg per tablet 1 tablet, Oral, Every 6 hours PRN    pantoprazole (PROTONIX) 40 MG tablet 1 tablet    temazepam (RESTORIL) 30 mg, Oral, Nightly    traZODone (DESYREL) 50 mg, Oral, Nightly    XARELTO 20 mg Tab SMARTSI Tablet(s) By Mouth Every Evening     Current Inpatient Medications   acetylcysteine 100 mg/ml (10%)  4 mL Nebulization Q6H    albuterol sulfate  2.5 mg Nebulization Q6H    atorvastatin  20 mg Per NG tube Nightly    carvediloL  12.5 mg Per NG tube BID    cefTRIAXone (ROCEPHIN) IVPB  2 g Intravenous Q24H    doxycycline  100 mg Per NG tube Q12H    enoxaparin  30 mg Subcutaneous Daily    epoetin franklin  20,000 Units Subcutaneous Every Mon, Wed, Fri    famotidine (PF)  20 mg Intravenous Daily    fenofibrate  48 mg Per NG tube Daily    guaiFENesin 100 mg/5 ml  400 mg Per NG tube Q4H    miconazole NITRATE 2 %   Topical (Top) BID    oxybutynin  5 mg Per NG tube BID    senna-docusate 8.6-50 mg  2 tablet Per NG tube BID    sodium citrate-citric acid 500-334 mg/5 ml  30 mL Oral Once     Current Intravenous Infusions   cisatracurium (NIMBEX) infusion Stopped (23 1450)    dexmedeTOMIDine (Precedex) infusion (titrating) 1.4 mcg/kg/hr (23 0318)    NORepinephrine bitartrate-D5W Stopped (23 1039)    propofoL 50 mcg/kg/min  (07/17/23 0138)       Objective:     Intake/Output Summary (Last 24 hours) at 7/17/2023 0518  Last data filed at 7/16/2023 2000  Gross per 24 hour   Intake 2821 ml   Output 1600 ml   Net 1221 ml       Vital Signs (Most Recent):  Temp: 98.2 °F (36.8 °C) (07/17/23 0000)  Pulse: 81 (07/17/23 0455)  Resp: 20 (07/17/23 0455)  BP: (!) 175/84 (07/17/23 0317)  SpO2: 95 % (07/17/23 0455)  Body mass index is 27.51 kg/m².  Weight: 86.2 kg (190 lb) Vital Signs (24h Range):  Temp:  [98 °F (36.7 °C)-98.4 °F (36.9 °C)] 98.2 °F (36.8 °C)  Pulse:  [60-94] 81  Resp:  [17-23] 20  SpO2:  [95 %-100 %] 95 %  BP: (107-178)/() 175/84     Physical Exam  Vitals and nursing note reviewed.   Constitutional:       Interventions: He is sedated and intubated.   HENT:      Head: Normocephalic and atraumatic.      Nose: Nose normal.   Cardiovascular:      Rate and Rhythm: Normal rate and regular rhythm.      Pulses: Normal pulses.      Heart sounds: Normal heart sounds.   Pulmonary:      Effort: He is intubated.      Comments: Coarse lung sounds heard throughout the anterior chest.   Abdominal:      General: Bowel sounds are normal.      Palpations: Abdomen is soft.   Musculoskeletal:      Cervical back: Neck supple.      Comments: Wound VAC in place to the right knee draining bloody fluid with a total of 300 mL   Skin:     General: Skin is warm and dry.   Neurological:      Comments: Unable to assess 2/2 patient sedation status at the time of exam   Psychiatric:      Comments: Unable to assess 2/2 patient sedation status at the time of exam       Lines/Drains/Airways       Central Venous Catheter Line  Duration                  Hemodialysis Catheter 07/14/23 1329 left internal jugular 2 days              Drain  Duration                  Suprapubic Catheter 07/06/23 1544 100% silicone 22 Fr. 10 days         NG/OG Tube 07/11/23 1425 Right nostril 5 days              Airway  Duration                  Airway - Non-Surgical 07/11/23 1420  Endotracheal Tube 5 days              Peripheral Intravenous Line  Duration                  Midline Catheter Insertion/Assessment  - Single Lumen 07/02/23 2128 Right basilic vein (medial side of arm) 14 days         Peripheral IV - Single Lumen 07/04/23 0600 18 G Anterior;Left Forearm 12 days         Peripheral IV - Single Lumen 07/04/23 0600 20 G Left;Posterior Hand 12 days                  Significant Labs:  Lab Results   Component Value Date    WBC 9.67 07/17/2023    HGB 8.8 (L) 07/17/2023    HCT 26.5 (L) 07/17/2023    MCV 75.7 (L) 07/17/2023     (H) 07/17/2023       BMP  Lab Results   Component Value Date     07/17/2023    K 4.3 07/17/2023    CHLORIDE 101 07/17/2023    CO2 22 07/17/2023    BUN 94.2 (H) 07/17/2023    CREATININE 3.22 (H) 07/17/2023    CALCIUM 8.9 07/17/2023    AGAP 9.0 08/29/2022    ESTGFRAFRICA 101 05/11/2022    EGFRNONAA >60 04/27/2022     ABG  Recent Labs   Lab 07/16/23  0647   PH 7.330*   PO2 92.0   HCO3 27.9*       Mechanical Ventilation Support:  Vent Mode: SIMV (07/17/23 0455)  Ventilator Initiated: Yes (07/11/23 1420)  Set Rate: 20 BPM (07/17/23 0455)  Vt Set: 450 mL (07/17/23 0455)  Pressure Support: 10 cmH20 (07/17/23 0455)  PEEP/CPAP: 8 cmH20 (07/17/23 0455)  Oxygen Concentration (%): 30 (07/17/23 0455)  Peak Airway Pressure: 20 cmH20 (07/17/23 0455)  Total Ve: 7.1 L/m (07/17/23 0455)  F/VT Ratio<105 (RSBI): (!) 55.87 (07/17/23 0455)    Significant Imaging:  I have reviewed the pertinent imaging within the past 24 hours.    Assessment/Plan:     Assessment  Extensive bilateral pulmonary infiltrates  DX:  Infectious pneumonia versus ARDS versus hydrostatic/non hydrostatic pulmonary edema   Sputum culture showed moderate yeast  Intubated 07/04/2023  Prepatellar bursitis/septic arthritis of the right knee (strep agalactiae)   Post I&D on 06/29/2023   Ortho no longer planning for right above-the-knee amputation in near future due to infectious suppression  Acute renal failure,  oliguric  Initiation of hemodialysis on 07/04/2023  Acute encephalopathy, likely metabolic related to above, currently clouded by sedation   Reported paroxysmal atrial fibrillation with permenant pacemaker, currently in sinus rhythm   H/o chronic paraplegia at T1 2/2 spinal cordy injury, neurogenic bladder, chronic right ankle osteomyelitis, DM2, HTN    Plan  -Continue ICU level of care for ongoing monitoring and medical management  -ID, urology, orthopedics, wound care teams following, appreciate their assistance   -HD/Ultrafiltration as per nephrology  -per ID Merrem discontinued and switched to ceftriaxone with end date of 08/09 and continuing suppressive doxycycline with further plan of 6 week course for GBS septic arthritis and following inflammatory markers; repeat cultures (7/9/23) NGTD.  -titrate mechanical ventilation for ARDS net protocol.  -Will trial methylnaltrexone for constipation, failed lactulose, only small amount of output over course of past few days with belly distension  -Pending Spontaneous Breathing Trial  -bronchoscopy revealed clot burden in the left lobe which was removed; oxygenation has improved       DVT Prophylaxis: LVX 30   GI Prophylaxis: Pepcid 20     32 minutes of critical care was time spent personally by me on the following activities: development of treatment plan with patient or surrogate and bedside caregivers, discussions with consultants, evaluation of patient's response to treatment, examination of patient, ordering and performing treatments and interventions, ordering and review of laboratory studies, ordering and review of radiographic studies, pulse oximetry, re-evaluation of patient's condition.  This critical care time did not overlap with that of any other provider or involve time for any procedures.    Favian Quiroz MD  Pulmonary Critical Care Medicine  [facility] - [facility] ICU

## 2023-07-17 NOTE — PROGRESS NOTES
Progress Note  Nephrology    Admit Date: 6/28/2023   LOS: 19 days     SUBJECTIVE:     Follow-up For:  ANTON / ATN    Interval History:  57 Y/O male with history of paraplegia and Right knee infection admitted for antibiotics   Pt also has neurogenic bladder with chronic suprapubic cath   Pts BUN and CR was high , was started on hemodialysis  currently dialyzing MWF  Had dialysis today and 3 liter of fluid removed    Review of Systems:  Constitutional: No fever or chills  Respiratory: No cough or shortness of breath  Cardiovascular: No chest pain or palpitations  Gastrointestinal: No nausea or vomiting  Neurological: No confusion or weakness    OBJECTIVE:     Vital Signs Range (Last 24H):  Vitals:    07/17/23 1600   BP:    Pulse:    Resp:    Temp: 98.1 °F (36.7 °C)       Temp:  [98.1 °F (36.7 °C)-98.4 °F (36.9 °C)]   Pulse:  [60-82]   Resp:  [9-24]   BP: (107-175)/(62-89)   SpO2:  [95 %-100 %]     I & O (Last 24H):  Intake/Output Summary (Last 24 hours) at 7/17/2023 1658  Last data filed at 7/17/2023 1619  Gross per 24 hour   Intake 1630 ml   Output 4725 ml   Net -3095 ml       Physical Exam:  On vent and intubated   Head  normal   Neck   supple   Chest   symmetric   Lungs   clear   Heart    RRR  Abdomen   soft , non tender   Ext   no edema     Laboratory Data:    Recent Labs   Lab 07/17/23  0123      K 4.3   CO2 22   BUN 94.2*   CREATININE 3.22*   GLUCOSE 120*   CALCIUM 8.9   PHOS 7.2*     Lab Results   Component Value Date    .3 (H) 07/04/2023    CALCIUM 8.9 07/17/2023    CAION 1.24 (H) 07/17/2023    PHOS 7.2 (H) 07/17/2023     Lab Results   Component Value Date    IRON 35 (L) 12/30/2021    TIBC 284 12/30/2021    FERRITIN 24.99 12/30/2021       Medications:  Medication list was reviewed and changes noted under Assessment/Plan.    Diagnostic Results:    Reviewed most recent CT/US/Echo/MRI    ASSESSMENT/PLAN:   1   ANTON /ATN  2   CKD  stage unspecified  3   SHPT  4   paraplegia with suprapubic cath   5    anemia on epo  6   HTN  7   A Fib  8   DM 2  9   Right knee infection   10  respiratory failure   on Vent       Plan   labs in AM

## 2023-07-18 LAB
ALBUMIN SERPL-MCNC: 2.7 G/DL (ref 3.5–5)
ALLENS TEST BLOOD GAS (OHS): YES
BASE EXCESS BLD CALC-SCNC: 4.6 MMOL/L
BASOPHILS # BLD AUTO: 0.02 X10(3)/MCL
BASOPHILS NFR BLD AUTO: 0.2 %
BLOOD GAS SAMPLE TYPE (OHS): ABNORMAL
BUN SERPL-MCNC: 59.5 MG/DL (ref 8.4–25.7)
CA-I BLD-SCNC: 1.2 MMOL/L (ref 1.12–1.23)
CALCIUM SERPL-MCNC: 8.8 MG/DL (ref 8.4–10.2)
CHLORIDE SERPL-SCNC: 99 MMOL/L (ref 98–107)
CO2 BLDA-SCNC: 31.9 MMOL/L
CO2 SERPL-SCNC: 24 MMOL/L (ref 22–29)
CREAT SERPL-MCNC: 2.34 MG/DL (ref 0.73–1.18)
DRAWN BY BLOOD GAS (OHS): ABNORMAL
EOSINOPHIL # BLD AUTO: 0.16 X10(3)/MCL (ref 0–0.9)
EOSINOPHIL NFR BLD AUTO: 1.7 %
ERYTHROCYTE [DISTWIDTH] IN BLOOD BY AUTOMATED COUNT: 22.1 % (ref 11.5–17)
FIO2 BLOOD GAS (OHS): 50 %
GFR SERPLBLD CREATININE-BSD FMLA CKD-EPI: 31 MLS/MIN/1.73/M2
GLUCOSE SERPL-MCNC: 119 MG/DL (ref 74–100)
HCO3 BLDA-SCNC: 30.4 MMOL/L
HCT VFR BLD AUTO: 30.7 % (ref 42–52)
HGB BLD-MCNC: 9.4 G/DL (ref 14–18)
IMM GRANULOCYTES # BLD AUTO: 0.06 X10(3)/MCL (ref 0–0.04)
IMM GRANULOCYTES NFR BLD AUTO: 0.6 %
LYMPHOCYTES # BLD AUTO: 1.57 X10(3)/MCL (ref 0.6–4.6)
LYMPHOCYTES NFR BLD AUTO: 16.8 %
MCH RBC QN AUTO: 25.4 PG (ref 27–31)
MCHC RBC AUTO-ENTMCNC: 30.6 G/DL (ref 33–36)
MCV RBC AUTO: 83 FL (ref 80–94)
MECH RR (OHS): 16 B/MIN
MECH VT (OHS): 450 ML
MODE (OHS): ABNORMAL
MONOCYTES # BLD AUTO: 1.2 X10(3)/MCL (ref 0.1–1.3)
MONOCYTES NFR BLD AUTO: 12.9 %
NEUTROPHILS # BLD AUTO: 6.31 X10(3)/MCL (ref 2.1–9.2)
NEUTROPHILS NFR BLD AUTO: 67.8 %
NRBC BLD AUTO-RTO: 0 %
OXYGEN DEVICE BLOOD GAS (OHS): ABNORMAL
PCO2 BLDA: 49 MMHG (ref 35–45)
PEEP (OHS): 8 CMH2O
PH BLDA: 7.4 [PH] (ref 7.35–7.45)
PHOSPHATE SERPL-MCNC: 5.3 MG/DL (ref 2.3–4.7)
PLATELET # BLD AUTO: 531 X10(3)/MCL (ref 130–400)
PMV BLD AUTO: 9.9 FL (ref 7.4–10.4)
PO2 BLDA: 73 MMHG (ref 80–100)
POCT GLUCOSE: 109 MG/DL (ref 70–110)
POCT GLUCOSE: 111 MG/DL (ref 70–110)
POCT GLUCOSE: 145 MG/DL (ref 70–110)
POTASSIUM BLOOD GAS (OHS): 3.2 MMOL/L (ref 3.5–5)
POTASSIUM SERPL-SCNC: 4.1 MMOL/L (ref 3.5–5.1)
PS (OHS): 12 CMH2O
RBC # BLD AUTO: 3.7 X10(6)/MCL (ref 4.7–6.1)
SAMPLE SITE BLOOD GAS (OHS): ABNORMAL
SAO2 % BLDA: 94 %
SODIUM BLOOD GAS (OHS): 134 MMOL/L (ref 137–145)
SODIUM SERPL-SCNC: 136 MMOL/L (ref 136–145)
WBC # SPEC AUTO: 9.32 X10(3)/MCL (ref 4.5–11.5)

## 2023-07-18 PROCEDURE — 25000242 PHARM REV CODE 250 ALT 637 W/ HCPCS

## 2023-07-18 PROCEDURE — 80069 RENAL FUNCTION PANEL: CPT | Performed by: INTERNAL MEDICINE

## 2023-07-18 PROCEDURE — 25000003 PHARM REV CODE 250

## 2023-07-18 PROCEDURE — 94761 N-INVAS EAR/PLS OXIMETRY MLT: CPT

## 2023-07-18 PROCEDURE — 82803 BLOOD GASES ANY COMBINATION: CPT

## 2023-07-18 PROCEDURE — 27200966 HC CLOSED SUCTION SYSTEM

## 2023-07-18 PROCEDURE — 63600175 PHARM REV CODE 636 W HCPCS: Mod: JZ,JG

## 2023-07-18 PROCEDURE — 25000003 PHARM REV CODE 250: Performed by: INTERNAL MEDICINE

## 2023-07-18 PROCEDURE — 94640 AIRWAY INHALATION TREATMENT: CPT

## 2023-07-18 PROCEDURE — 25000003 PHARM REV CODE 250: Performed by: STUDENT IN AN ORGANIZED HEALTH CARE EDUCATION/TRAINING PROGRAM

## 2023-07-18 PROCEDURE — 63600175 PHARM REV CODE 636 W HCPCS: Performed by: INTERNAL MEDICINE

## 2023-07-18 PROCEDURE — 63600175 PHARM REV CODE 636 W HCPCS: Performed by: STUDENT IN AN ORGANIZED HEALTH CARE EDUCATION/TRAINING PROGRAM

## 2023-07-18 PROCEDURE — 85025 COMPLETE CBC W/AUTO DIFF WBC: CPT

## 2023-07-18 PROCEDURE — 99900035 HC TECH TIME PER 15 MIN (STAT)

## 2023-07-18 PROCEDURE — 94667 MNPJ CHEST WALL 1ST: CPT

## 2023-07-18 PROCEDURE — 20000000 HC ICU ROOM

## 2023-07-18 PROCEDURE — 94003 VENT MGMT INPAT SUBQ DAY: CPT

## 2023-07-18 PROCEDURE — 63600175 PHARM REV CODE 636 W HCPCS

## 2023-07-18 PROCEDURE — 27000221 HC OXYGEN, UP TO 24 HOURS

## 2023-07-18 PROCEDURE — 97605 NEG PRS WND THER DME<=50SQCM: CPT

## 2023-07-18 PROCEDURE — 99900026 HC AIRWAY MAINTENANCE (STAT)

## 2023-07-18 PROCEDURE — C9248 INJ, CLEVIDIPINE BUTYRATE: HCPCS | Mod: JZ,JG

## 2023-07-18 PROCEDURE — 36600 WITHDRAWAL OF ARTERIAL BLOOD: CPT

## 2023-07-18 PROCEDURE — 99900031 HC PATIENT EDUCATION (STAT)

## 2023-07-18 RX ORDER — ENOXAPARIN SODIUM 100 MG/ML
30 INJECTION SUBCUTANEOUS EVERY 12 HOURS
Status: DISCONTINUED | OUTPATIENT
Start: 2023-07-18 | End: 2023-08-09 | Stop reason: HOSPADM

## 2023-07-18 RX ORDER — CARVEDILOL 12.5 MG/1
25 TABLET ORAL 2 TIMES DAILY
Status: DISCONTINUED | OUTPATIENT
Start: 2023-07-19 | End: 2023-08-09 | Stop reason: HOSPADM

## 2023-07-18 RX ORDER — CARVEDILOL 12.5 MG/1
12.5 TABLET ORAL ONCE
Status: COMPLETED | OUTPATIENT
Start: 2023-07-18 | End: 2023-07-18

## 2023-07-18 RX ORDER — NIFEDIPINE 60 MG/1
60 TABLET, EXTENDED RELEASE ORAL DAILY
Status: DISCONTINUED | OUTPATIENT
Start: 2023-07-18 | End: 2023-07-18

## 2023-07-18 RX ADMIN — GUAIFENESIN 400 MG: 200 SOLUTION ORAL at 10:07

## 2023-07-18 RX ADMIN — SENNOSIDES AND DOCUSATE SODIUM 2 TABLET: 50; 8.6 TABLET ORAL at 08:07

## 2023-07-18 RX ADMIN — HYDRALAZINE HYDROCHLORIDE 10 MG: 20 INJECTION INTRAMUSCULAR; INTRAVENOUS at 06:07

## 2023-07-18 RX ADMIN — LABETALOL HYDROCHLORIDE 10 MG: 5 INJECTION, SOLUTION INTRAVENOUS at 10:07

## 2023-07-18 RX ADMIN — PROPOFOL 50 MCG/KG/MIN: 10 INJECTION, EMULSION INTRAVENOUS at 07:07

## 2023-07-18 RX ADMIN — LABETALOL HYDROCHLORIDE 10 MG: 5 INJECTION, SOLUTION INTRAVENOUS at 05:07

## 2023-07-18 RX ADMIN — ALBUTEROL SULFATE 2.5 MG: 2.5 SOLUTION RESPIRATORY (INHALATION) at 08:07

## 2023-07-18 RX ADMIN — OXYCODONE HYDROCHLORIDE AND ACETAMINOPHEN 1 TABLET: 10; 325 TABLET ORAL at 08:07

## 2023-07-18 RX ADMIN — CARVEDILOL 12.5 MG: 12.5 TABLET, FILM COATED ORAL at 08:07

## 2023-07-18 RX ADMIN — OXYBUTYNIN CHLORIDE 5 MG: 5 TABLET ORAL at 08:07

## 2023-07-18 RX ADMIN — CEFTRIAXONE SODIUM 2 G: 2 INJECTION, POWDER, FOR SOLUTION INTRAMUSCULAR; INTRAVENOUS at 11:07

## 2023-07-18 RX ADMIN — ACETYLCYSTEINE 4 ML: 100 INHALANT RESPIRATORY (INHALATION) at 07:07

## 2023-07-18 RX ADMIN — ACETYLCYSTEINE 4 ML: 100 INHALANT RESPIRATORY (INHALATION) at 08:07

## 2023-07-18 RX ADMIN — DEXMEDETOMIDINE HYDROCHLORIDE 0.4 MCG/KG/HR: 400 INJECTION INTRAVENOUS at 04:07

## 2023-07-18 RX ADMIN — CLEVIPIDINE 6 MG/HR: 0.5 EMULSION INTRAVENOUS at 11:07

## 2023-07-18 RX ADMIN — ENOXAPARIN SODIUM 30 MG: 30 INJECTION SUBCUTANEOUS at 08:07

## 2023-07-18 RX ADMIN — DEXMEDETOMIDINE HYDROCHLORIDE 1.4 MCG/KG/HR: 400 INJECTION INTRAVENOUS at 03:07

## 2023-07-18 RX ADMIN — DEXMEDETOMIDINE HYDROCHLORIDE 1.4 MCG/KG/HR: 400 INJECTION INTRAVENOUS at 07:07

## 2023-07-18 RX ADMIN — ALBUTEROL SULFATE 2.5 MG: 2.5 SOLUTION RESPIRATORY (INHALATION) at 07:07

## 2023-07-18 RX ADMIN — GUAIFENESIN 400 MG: 200 SOLUTION ORAL at 08:07

## 2023-07-18 RX ADMIN — GUAIFENESIN 400 MG: 200 SOLUTION ORAL at 05:07

## 2023-07-18 RX ADMIN — ALBUTEROL SULFATE 2.5 MG: 2.5 SOLUTION RESPIRATORY (INHALATION) at 02:07

## 2023-07-18 RX ADMIN — ATORVASTATIN CALCIUM 20 MG: 10 TABLET, FILM COATED ORAL at 08:07

## 2023-07-18 RX ADMIN — GUAIFENESIN 400 MG: 200 SOLUTION ORAL at 03:07

## 2023-07-18 RX ADMIN — ACETYLCYSTEINE 4 ML: 100 INHALANT RESPIRATORY (INHALATION) at 02:07

## 2023-07-18 RX ADMIN — LABETALOL HYDROCHLORIDE 10 MG: 5 INJECTION, SOLUTION INTRAVENOUS at 01:07

## 2023-07-18 RX ADMIN — ALBUTEROL SULFATE 2.5 MG: 2.5 SOLUTION RESPIRATORY (INHALATION) at 01:07

## 2023-07-18 RX ADMIN — MICONAZOLE NITRATE 2 % TOPICAL POWDER: at 08:07

## 2023-07-18 RX ADMIN — GUAIFENESIN 400 MG: 200 SOLUTION ORAL at 01:07

## 2023-07-18 RX ADMIN — DOXYCYCLINE HYCLATE 100 MG: 100 TABLET, COATED ORAL at 08:07

## 2023-07-18 RX ADMIN — PROPOFOL 50 MCG/KG/MIN: 10 INJECTION, EMULSION INTRAVENOUS at 04:07

## 2023-07-18 RX ADMIN — CARVEDILOL 12.5 MG: 12.5 TABLET, FILM COATED ORAL at 11:07

## 2023-07-18 RX ADMIN — OXYCODONE HYDROCHLORIDE AND ACETAMINOPHEN 1 TABLET: 10; 325 TABLET ORAL at 01:07

## 2023-07-18 RX ADMIN — DEXMEDETOMIDINE HYDROCHLORIDE 0.2 MCG/KG/HR: 400 INJECTION INTRAVENOUS at 08:07

## 2023-07-18 RX ADMIN — DEXMEDETOMIDINE HYDROCHLORIDE 1.4 MCG/KG/HR: 400 INJECTION INTRAVENOUS at 01:07

## 2023-07-18 RX ADMIN — ACETYLCYSTEINE 4 ML: 100 INHALANT RESPIRATORY (INHALATION) at 01:07

## 2023-07-18 RX ADMIN — FENOFIBRATE 48 MG: 48 TABLET, FILM COATED ORAL at 08:07

## 2023-07-18 RX ADMIN — FAMOTIDINE 20 MG: 10 INJECTION, SOLUTION INTRAVENOUS at 08:07

## 2023-07-18 RX ADMIN — DEXMEDETOMIDINE HYDROCHLORIDE 1.4 MCG/KG/HR: 400 INJECTION INTRAVENOUS at 10:07

## 2023-07-18 NOTE — PLAN OF CARE
Problem: Adult Inpatient Plan of Care  Goal: Plan of Care Review  Outcome: Ongoing, Progressing  Goal: Patient-Specific Goal (Individualized)  Outcome: Ongoing, Progressing  Goal: Absence of Hospital-Acquired Illness or Injury  Outcome: Ongoing, Progressing  Goal: Optimal Comfort and Wellbeing  Outcome: Ongoing, Progressing  Goal: Readiness for Transition of Care  Outcome: Ongoing, Progressing     Problem: Diabetes Comorbidity  Goal: Blood Glucose Level Within Targeted Range  Outcome: Ongoing, Progressing     Problem: Skin Injury Risk Increased  Goal: Skin Health and Integrity  Outcome: Ongoing, Progressing     Problem: Impaired Wound Healing  Goal: Optimal Wound Healing  Outcome: Ongoing, Progressing     Problem: Pain Acute  Goal: Acceptable Pain Control and Functional Ability  Outcome: Ongoing, Progressing     Problem: Infection  Goal: Absence of Infection Signs and Symptoms  Outcome: Ongoing, Progressing     Problem: Skin and Tissue Injury (Artificial Airway)  Goal: Absence of Device-Related Skin or Tissue Injury  Outcome: Ongoing, Progressing

## 2023-07-18 NOTE — NURSING
Nurses Note -- 4 Eyes      7/18/2023   4:25 PM      Skin assessed during: Daily Assessment      [] No Altered Skin Integrity Present    []Prevention Measures Documented      [x] Yes- Altered Skin Integrity Present or Discovered   [] LDA Added if Not in Epic (Describe Wound)   [] New Altered Skin Integrity was Present on Admit and Documented in LDA   [] Wound Image Taken    Wound Care Consulted? Yes    Attending Nurse:  Keagan Grijalva RN     Second RN/Staff Member:  Morena Calhoun Rn

## 2023-07-18 NOTE — NURSING
07/18/23 1000   Pain/Comfort/Sleep   Preferred Pain Scale rFLACC (Revised Face Legs Arms Cry Consolability Scale)   Comfort/Acceptable Pain Level 0   Pain Rating (0-10): Rest 0   Pain Rating (0-10): Activity 0   Cognitive   Level of Consciousness (AVPU) responds to pain   Orientation other (see comments)  (Sedated/Intubated)   Speech intubated   Neuro   Swallowing Signs/Symptoms cough   Pupils   Pupil Size Left 3 mm   Pupil Shape Left round   Pupil Reaction Left brisk;equal   Pupil Accommodation Left normal response   Pupil Size Right 3 mm   Pupil Shape Right round   Pupil Reaction Right brisk;equal   Pupil Accommodation Right normal response   [place] Coma Scale   Best Eye Response 2-->(E2) to pain   Best Motor Response 4-->(M4) withdraws from pain   Best Verbal Response 1-->(V1) none   [place] Coma Scale Score 7   Assessment Qualifiers patient chemically sedated or paralyzed;patient intubated   Motor Response   General Motor Response withdraws   LUE Motor Response withdraws to noxious stimulation   RUE Motor Response withdraws to noxious stimulation   LLE Motor Response no movement   RLE Motor Response no movement   Hand /Ankle Strength   Hand , Left absent   Hand , Right absent   Dorsiflexion, Left absent   Dorsiflexion, Right absent   Plantarflexion, Left absent   Plantarflexion, Right absent   Visual Assessment   Visual Tracking other (see comments)  (sedated)   Oxygen Therapy   Device (Oxygen Therapy) ventilator   Oxygen Concentration (%) 50   Pulse Radial   Left Radial Pulse 2+ (normal)   Right Radial Pulse 2+ (normal)   Pulse Dorsalis Pedis   Left Dorsalis Pedis Pulse 2+ (normal)   Right Dorsalis Pedis Pulse 2+ (normal)        Incision/Site 06/29/23 1103 Right Leg   Date First Assessed/Time First Assessed: 06/29/23 1103   Side: Right  Location: Leg   Wound Image    Dressing Appearance Intact;Moist drainage   Drainage Amount Small   Drainage Characteristics/Odor Serosanguineous;No odor    Appearance Pink;Red;White;Moist   Red (%), Wound Tissue Color 100 %   Periwound Area Dry;Intact   Wound Edges Irregular   Wound Length (cm) 2 cm   Wound Width (cm) 1 cm   Wound Depth (cm) 0.8 cm   Wound Volume (cm^3) 1.6 cm^3   Wound Surface Area (cm^2) 2 cm^2   Care Cleansed with:;Sterile normal saline   Dressing Changed        Negative Pressure Wound Therapy  06/29/23 1120 Right anterior   Placement Date/Time: 06/29/23 1120   Side: Right  Orientation: anterior  Location: Leg   NPWT Type Vacuum Therapy   Therapy Setting NPWT Continuous therapy   Pressure Setting NPWT 125 mmHg   Sponges Inserted NPWT Black;White;1   Sponges Removed NPWT Black;White;1        Altered Skin Integrity 06/28/23 2230 Right lateral Ankle #6   Date First Assessed/Time First Assessed: 06/28/23 2230   Altered Skin Integrity Present on Admission - Did Patient arrive to the hospital with altered skin?: yes  Side: Right  Orientation: lateral  Location: Ankle  Wound Number: #6  Is this injury dev...   Wound Image    Wound Length (cm) 3.5 cm   Wound Width (cm) 2.5 cm   Wound Depth (cm) 0.3 cm   Wound Volume (cm^3) 2.625 cm^3   Wound Surface Area (cm^2) 8.75 cm^2   Undermining (depth (cm)/location) .3   Care Cleansed with:;Sterile normal saline   Dressing   (with vashe sol.)   Skin Interventions   Device Skin Pressure Protection absorbent pad utilized/changed;positioning supports utilized;mittens applied to hands   Pressure Reduction Devices heel offloading device utilized;positioning supports utilized   Pressure Reduction Techniques heels elevated off bed;pressure points protected   Skin Protection adhesive use limited;tubing/devices free from skin contact   Safety   Airway Safety Measures manual resuscitator/mask at bedside;suction at bedside   Safety Precautions emergency equipment at bedside   Enhanced Safety Measures monitored by video   Infection Prevention environmental surveillance performed;hand hygiene promoted   Safety Management    Safety Promotion/Fall Prevention assistive device/personal item within reach;side rails raised x 3;pulse ox   Patient Rounds visualized patient;toileting offered;placement of personal items at bedside;ID band on;clutter free environment maintained;call light in patient/parent reach;bed wheels locked;bed in low position   Safety Bands on Patient Fall Risk Band   Daily Care   Activity Management Patient unable to perform activities   Activity Assistance Provided assistance, 2 people   Positioning   Body Position right;left;turned   Head of Bed (HOB) Positioning HOB at 30 degrees   Positioning/Transfer Devices pillows;wedge;in use   WOCN [id number] right and right maleolus wds.   Changed out dressings.   Pt is ventilated and sedated --tolerated well.  Change incare to right maleolus to wet to dry with vashe sol.

## 2023-07-18 NOTE — PROGRESS NOTES
Progress Note  Nephrology    Admit Date: 6/28/2023   LOS: 20 days     SUBJECTIVE:     Follow-up For:  ANTON / ATn    Interval History:  57 Y/O male with history of paraplegia and neurogenic bladder , with suprapubic cath admitted to hospital for Right knee infection   Pts BUN and CR increased and pt started on hemodialysis   Also got ontubated and on vent   Today  he is extubated     Review of Systems:  Constitutional: No fever or chills  Respiratory: No cough or shortness of breath  Cardiovascular: No chest pain or palpitations  Gastrointestinal: No nausea or vomiting  Neurological: No confusion or weakness    OBJECTIVE:     Vital Signs Range (Last 24H):  Vitals:    07/18/23 1415   BP: (!) 169/85   Pulse: 93   Resp: 16   Temp:        Temp:  [98.1 °F (36.7 °C)-98.9 °F (37.2 °C)]   Pulse:  [60-98]   Resp:  [3-33]   BP: (119-178)/(68-99)   SpO2:  [89 %-100 %]     I & O (Last 24H):  Intake/Output Summary (Last 24 hours) at 7/18/2023 1544  Last data filed at 7/18/2023 1400  Gross per 24 hour   Intake 3193 ml   Output 1975 ml   Net 1218 ml       Physical Exam:  Alert , oriented , in NAD  Head   normal   Neck   supple   Chest   symmetric   Lungs   clear   Heart   RRR  Abdomen   soft , non tender   Ext   no edema   24 hr Urine output is 1900 cc    Laboratory Data:    Recent Labs   Lab 07/18/23  0131      K 4.1   CO2 24   BUN 59.5*   CREATININE 2.34*   GLUCOSE 119*   CALCIUM 8.8   PHOS 5.3*     Lab Results   Component Value Date    .3 (H) 07/04/2023    CALCIUM 8.8 07/18/2023    CAION 1.20 07/18/2023    PHOS 5.3 (H) 07/18/2023     Lab Results   Component Value Date    IRON 35 (L) 12/30/2021    TIBC 284 12/30/2021    FERRITIN 24.99 12/30/2021       Medications:  Medication list was reviewed and changes noted under Assessment/Plan.    Diagnostic Results:    Reviewed most recent CT/US/Echo/MRI    ASSESSMENT/PLAN:   1   ANTON / ATN  2   CKD  stage unspecified  3   SHPT  4   paraplegia  5   Anemia , better today 30.5  6    HTN  7   A Fib  8   DM2  9   Right knee infection   10  Respiratory failure   off of Vent      Plan   hold dialysios for now

## 2023-07-18 NOTE — PROGRESS NOTES
[facility] - [facility]  Pulmonary Critical Care Note    Patient Name: Bianca Khan  MRN: [id number]  Admission Date: 6/28/2023  Hospital Length of Stay: 20 days  Code Status: Full Code  Attending Provider: Khris Treadwell Jr., MD, *  Primary Care Provider: Areli Vargas PA-C     Subjective:     HPI:   Bianca Khan is a 58 y.o. male with PMH of paraplegia 2/2 spinal cord injury (T1-T6), neurogenic bladder with suprapubic catheter, chronic right ankle osteomyelitis, DM II, HTN, who presented to the ED on 6/28/2023 with CC of right knee pain. Per chart review, CT of right knee revealed 5 cm area of subcutaneous fluid in prepatellar region which was aspirated in the ED; he was taken to the OR on 6/29/2023 by orthopedic surgery team and was found to have prepatellar bursa infection and septic arthritis. Wound culture 6/29/2023 positive for strep agalactiae. Orthopedic surgery team recommended right-sided above-knee amputation d/t chronically infected hardware in right lower extremity. On 7/4/2023, a RRT was called d/t acute respiratory distress that was not improving despite being placed on vapotherm at 35 L/min with FiO2 100%. At the time of examination, patient's initial GCS was 10 but progressively reduced to a GCS of 6. Shared decision with patient's wife was made to intubate patient for airway protection though ABGs were within normal limits. He is admitted to the ICU for close monitoring and further medical management.    Hospital Course/Significant events:  6/29/2023 - I&D of right knee  7/4/2023 - Admitted to ICU, intubated for airway protection d/t altered mental status. Trialysis catheter placed and HD began       24 Hour Interval History:  No acute events overnight.  Vital signs remained stable overnight.  Pending morning CBC,  CMP is stable.  BUN/Creatinine improving to 59.5/2.34.  Continues to saturate well.  Will continue Rocephin.  Ventilatory settings are being weaned.  Currently on  30 FiO2, 8 of PEEP, 16 respiratory rate, and 450 tidal volume.  Pending morning ABG today.  Continues to be sedated on propofol and Precedex.     Past Medical History:   Diagnosis Date    Arthritis     Chronic ulcer of ankle 05/26/2022    Frequent UTI 07/02/2019    Generalized anxiety disorder 05/26/2022    Neurogenic bladder 05/26/2022    Osteomyelitis 05/26/2022    Presence of suprapubic catheter 05/26/2022    Pure hypercholesterolemia 05/26/2022    Retention of urine, unspecified 08/09/2019    Spinal cord injury at T1-T6 level 04/20/2018       Past Surgical History:   Procedure Laterality Date    INCISION AND DRAINAGE, LOWER EXTREMITY Right 6/29/2023    Procedure: INCISION AND DRAINAGE, LOWER EXTREMITY;  Surgeon: Prabhu Shen DO;  Location: [facility];  Service: Orthopedics;  Laterality: Right;  supine bone foam wash stuff cultures    INSERTION OF INTRAMEDULLARY MARISA Right 8/10/2022    Procedure: INSERTION, INTRAMEDULLARY MARISA RIGHT TIBIA;  Surgeon: Jorge Orellana MD;  Location: [facility];  Service: Orthopedics;  Laterality: Right;       Social History     Socioeconomic History    Marital status:    Tobacco Use    Smoking status: Every Day     Types: Cigars, Cigarettes    Smokeless tobacco: Never   Substance and Sexual Activity    Alcohol use: Yes     Alcohol/week: 2.0 standard drinks     Types: 2 Cans of beer per week    Drug use: Not Currently    Sexual activity: Never       Current Outpatient Medications   Medication Instructions    amitriptyline (ELAVIL) 50 mg, Oral, Nightly    amLODIPine (NORVASC) 10 MG tablet 1 tablet    atorvastatin (LIPITOR) 20 mg, Oral, Nightly    busPIRone (BUSPAR) 5 MG Tab 1 tablet    carvediloL (COREG) 12.5 mg, Oral    cyclobenzaprine (FLEXERIL) 10 mg, Oral, 2 times daily PRN    doxycycline (VIBRAMYCIN) 100 mg, Oral, Daily    fenofibrate 160 mg, Oral    FLUoxetine 20 mg, Oral    gabapentin (NEURONTIN) 300 MG capsule 1 capsule    lisinopriL 10 mg, Oral, Daily    LORazepam (ATIVAN) 1  mg, Oral, 2 times daily    melatonin (MELATIN) 3 mg tablet TAKE TWO TABLETS BY MOUTH EVERY NIGHT AT BEDTIME AS NEEDED FOR INSOMNIA.    meloxicam (MOBIC) 15 mg, Oral, Daily    metFORMIN (GLUCOPHAGE) 500 MG tablet SMARTSI Tablet(s) By Mouth Morning-Evening    oxybutynin (DITROPAN) 5 mg, Oral, 2 times daily    oxyCODONE-acetaminophen (PERCOCET)  mg per tablet 1 tablet, Oral, Every 6 hours PRN    pantoprazole (PROTONIX) 40 MG tablet 1 tablet    temazepam (RESTORIL) 30 mg, Oral, Nightly    traZODone (DESYREL) 50 mg, Oral, Nightly    XARELTO 20 mg Tab SMARTSI Tablet(s) By Mouth Every Evening     Current Inpatient Medications   acetylcysteine 100 mg/ml (10%)  4 mL Nebulization Q6H    albuterol sulfate  2.5 mg Nebulization Q6H    atorvastatin  20 mg Per NG tube Nightly    carvediloL  12.5 mg Per NG tube BID    cefTRIAXone (ROCEPHIN) IVPB  2 g Intravenous Q24H    doxycycline  100 mg Per NG tube Q12H    enoxaparin  30 mg Subcutaneous Daily    epoetin franklin  20,000 Units Subcutaneous Every Mon, Wed, Fri    famotidine (PF)  20 mg Intravenous Daily    fenofibrate  48 mg Per NG tube Daily    guaiFENesin 100 mg/5 ml  400 mg Per NG tube Q4H    miconazole NITRATE 2 %   Topical (Top) BID    oxybutynin  5 mg Per NG tube BID    senna-docusate 8.6-50 mg  2 tablet Per NG tube BID    sodium citrate-citric acid 500-334 mg/5 ml  30 mL Oral Once     Current Intravenous Infusions   cisatracurium (NIMBEX) infusion Stopped (23 1450)    dexmedeTOMIDine (Precedex) infusion (titrating) 1.4 mcg/kg/hr (23 2225)    NORepinephrine bitartrate-D5W Stopped (23 1039)    propofoL 50 mcg/kg/min (23 3527)       Objective:     Intake/Output Summary (Last 24 hours) at 2023  Last data filed at 2023  Gross per 24 hour   Intake 1510 ml   Output 4450 ml   Net -2940 ml       Vital Signs (Most Recent):  Temp: 98.9 °F (37.2 °C) (23 0000)  Pulse: 97 (23)  Resp: (!) 22 (23)  BP: (!)  164/96 (07/18/23 0100)  SpO2: 97 % (07/18/23 0246)  Body mass index is 27.51 kg/m².  Weight: 86.2 kg (190 lb) Vital Signs (24h Range):  Temp:  [98.1 °F (36.7 °C)-98.9 °F (37.2 °C)] 98.9 °F (37.2 °C)  Pulse:  [60-98] 97  Resp:  [9-24] 22  SpO2:  [95 %-100 %] 97 %  BP: (107-166)/(62-96) 164/96     Physical Exam  Vitals and nursing note reviewed.   Constitutional:       Interventions: He is sedated and intubated.   HENT:      Head: Normocephalic and atraumatic.      Nose: Nose normal.   Cardiovascular:      Rate and Rhythm: Normal rate and regular rhythm.      Pulses: Normal pulses.      Heart sounds: Normal heart sounds.   Pulmonary:      Effort: He is intubated.      Comments: Coarse lung sounds heard throughout the anterior chest.   Abdominal:      General: Bowel sounds are normal.      Palpations: Abdomen is soft.   Musculoskeletal:      Cervical back: Neck supple.      Comments: Wound VAC in place to the right knee draining bloody fluid with a total of 300 mL   Skin:     General: Skin is warm and dry.   Neurological:      Comments: Unable to assess 2/2 patient sedation status at the time of exam   Psychiatric:      Comments: Unable to assess 2/2 patient sedation status at the time of exam       Lines/Drains/Airways       Central Venous Catheter Line  Duration                  Hemodialysis Catheter 07/14/23 1329 left internal jugular 3 days              Drain  Duration                  Suprapubic Catheter 07/06/23 1544 100% silicone 22 Fr. 11 days         NG/OG Tube 07/11/23 1425 Right nostril 6 days              Airway  Duration                  Airway - Non-Surgical 07/11/23 1420 Endotracheal Tube 6 days              Peripheral Intravenous Line  Duration                  Midline Catheter Insertion/Assessment  - Single Lumen 07/02/23 2128 Right basilic vein (medial side of arm) 15 days         Peripheral IV - Single Lumen 07/04/23 0600 18 G Anterior;Left Forearm 13 days         Peripheral IV - Single Lumen  07/04/23 0600 20 G Left;Posterior Hand 13 days                  Significant Labs:  Lab Results   Component Value Date    WBC 9.67 07/17/2023    HGB 8.8 (L) 07/17/2023    HCT 26.5 (L) 07/17/2023    MCV 75.7 (L) 07/17/2023     (H) 07/17/2023       BMP  Lab Results   Component Value Date     07/17/2023    K 4.3 07/17/2023    CHLORIDE 101 07/17/2023    CO2 22 07/17/2023    BUN 94.2 (H) 07/17/2023    CREATININE 3.22 (H) 07/17/2023    CALCIUM 8.9 07/17/2023    AGAP 9.0 08/29/2022    ESTGFRAFRICA 101 05/11/2022    EGFRNONAA >60 04/27/2022     ABG  Recent Labs   Lab 07/17/23 0644   PH 7.380   PO2 81.0   HCO3 26.6       Mechanical Ventilation Support:  Vent Mode: SIMV (07/18/23 0246)  Ventilator Initiated: Yes (07/11/23 1420)  Set Rate: 16 BPM (07/18/23 0246)  Vt Set: 450 mL (07/18/23 0246)  Pressure Support: 12 cmH20 (07/18/23 0246)  PEEP/CPAP: 8 cmH20 (07/18/23 0246)  Oxygen Concentration (%): 30 (07/18/23 0246)  Peak Airway Pressure: 22 cmH20 (07/18/23 0246)  Total Ve: 13.9 L/m (07/18/23 0246)  F/VT Ratio<105 (RSBI): (!) 52.76 (07/18/23 0246)    Significant Imaging:  I have reviewed the pertinent imaging within the past 24 hours.    Assessment/Plan:     Assessment  Extensive bilateral pulmonary infiltrates  DX:  Infectious pneumonia versus ARDS versus hydrostatic/non hydrostatic pulmonary edema   Sputum culture showed moderate yeast  Intubated 07/04/2023  Prepatellar bursitis/septic arthritis of the right knee (strep agalactiae)   Post I&D on 06/29/2023   Ortho no longer planning for right above-the-knee amputation in near future due to infectious suppression  Acute renal failure, oliguric  Initiation of hemodialysis on 07/04/2023  Acute encephalopathy, likely metabolic related to above, currently clouded by sedation   Reported paroxysmal atrial fibrillation with permenant pacemaker, currently in sinus rhythm   H/o chronic paraplegia at T1 2/2 spinal cordy injury, neurogenic bladder, chronic right ankle  osteomyelitis, DM2, HTN    Plan  -Continue ICU level of care for ongoing monitoring and medical management  -ID, urology, orthopedics, wound care teams following, appreciate their assistance   -HD/Ultrafiltration as per nephrology  -per ID Merrem discontinued and switched to ceftriaxone with end date of 08/09 and continuing suppressive doxycycline with further plan of 6 week course for GBS septic arthritis and following inflammatory markers; repeat cultures (7/9/23) NGTD.  -titrate mechanical ventilation for ARDS net protocol.  -Will trial methylnaltrexone for constipation, failed lactulose, only small amount of output over course of past few days with belly distension  -on minimal vent settings, we will attempt to wean off vent today.  -bronchoscopy revealed clot burden in the left lobe which was removed; oxygenation has improved.  Will limit suctioning       DVT Prophylaxis: LVX 30   GI Prophylaxis: Pepcid 20         Woody Azul DO  Pulmonary Critical Care Medicine  [facility] - [facility] ICU

## 2023-07-18 NOTE — PROGRESS NOTES
Inpatient Nutrition Assessment    Admit Date: 6/28/2023   Total duration of encounter: 20 days     Nutrition Recommendation/Prescription     Tube feeding recommendation:   Novasource Renal goal rate 35 ml/hr + 4 packets ProSource NoCarb daily to provide  1640 kcal/d (96% est needs, 136% with meds)  123 g protein/d (95% est needs)  504 ml free water/d  (calculations based on estimated 20 hr/d run time)     Also add Malachi (provides 90 kcal, 2.5 g protein per serving) BID.    Communication of Recommendations: reviewed with nurse    Nutrition Assessment     Malnutrition Assessment/Nutrition-Focused Physical Exam    Malnutrition Context: chronic illness  Malnutrition Level:  (does not meet criteria)  Energy Intake (Malnutrition):  (unable to obtain)  Weight Loss (Malnutrition):  (unable to obtain)  Subcutaneous Fat (Malnutrition):  (does not meet criteria)           Muscle Mass (Malnutrition):  (does not meet criteria)                          Fluid Accumulation (Malnutrition):  (does not meet criteria)        A minimum of two characteristics is recommended for diagnosis of either severe or non-severe malnutrition.    Chart Review    Reason Seen: continuous nutrition monitoring and follow-up    Malnutrition Screening Tool Results   Have you recently lost weight without trying?: Unsure  Have you been eating poorly because of a decreased appetite?: Yes   MST Score: 3     Diagnosis:  Bilateral Pulmonary Infiltrates   Uremic encephalopathy 2/2 acute renal failure  Acute renal failure  Hyponatremia  Septic arthritis    Relevant Medical History: paraplegia 2/2 spinal cord injury (T1-T6), neurogenic bladder with suprapubic catheter, chronic right ankle osteomyelitis, DM II, HTN    Nutrition-Related Medications:   diprivan, senna-docusate (plans for relistor per MD notes)    Calorie Containing IV Medications: Diprivan @ 26 ml/hr (provides 685 kcal/d)    Nutrition-Related Labs:  7/5 Na 128, K 5.3, Glu 93, BUN 39.8, Crea 7.07,  "Phos 7.1, GFR 8  7/11 Na 133, BUN 75.8, Crea 4.3, Glu 121, Phos 6.5  7/14 .2, Crea 4.09, Glu 118, Phos 5.9  7/18 BUN 59.5, Crea 2.34, Glu 119, Phos 5.3    Diet/PN Order: No diet orders on file  Oral Supplement Order: none  Tube Feeding Order:  Novasource Renal @ 35ml/hr (see below for calculation)  Appetite/Oral Intake: not applicable/not applicable  Factors Affecting Nutritional Intake: on mechanical ventilation  Food/Jainism/Cultural Preferences: unable to obtain  Food Allergies: none reported    Skin Integrity: drain/device(s), wound  Wound(s):      Altered Skin Integrity 06/28/23 2230 medial Sacral spine #1-Tissue loss description: Partial thickness       Altered Skin Integrity 06/28/23 2230 Left posterior Buttocks #4-Tissue loss description: Partial thickness       Altered Skin Integrity 06/28/23 2230 Right lateral Ankle #6-Tissue loss description: Full thickness     Comments    7/5/23: Discussed with RN. Will provide tube feeding recommendations for when appropriate to start tube feeding. Receiving kcal from meds. Noted K and Phos, will need renal formula at this time. Will add supplemental protein and Malachi for wound healing.     7/11/23: TF continues, tolerated @ goal rate.     7/14/23: TF continues, tolerated per RN.     7/18/23: TF continues, tolerated per RN. Possible plans for vent weaning today. Still receiving kcal from meds. Noted increase in diprivan. If remains at increased rate, will adjust TF rate to not overfeed.    Anthropometrics    Height: 5' 9.69" (177 cm) Height Method: Stated  Last Weight: 86.2 kg (190 lb) (07/04/23 1114) Weight Method: Standard Scale (stated)  BMI (Calculated): 27.5  BMI Classification: overweight (BMI 25-29.9)        Ideal Body Weight (IBW), Male: 164.14 lb     % Ideal Body Weight, Male (lb): 115.75 %                          Usual Weight Provided By: unable to obtain usual weight    Wt Readings from Last 5 Encounters:   07/04/23 86.2 kg (190 lb)   09/13/22 86.2 " kg (190 lb)   22 86.2 kg (190 lb 0.6 oz)   22 86.2 kg (189 lb 15.9 oz)     Weight Change(s) Since Admission:  Admit Weight: 86.2 kg (190 lb) (23 1346)  23: no new  23: no new    Estimated Needs    Weight Used For Calorie Calculations: 86.2 kg (190 lb 0.6 oz)  Energy Calorie Requirements (kcal): 1714kcal  Energy Need Method: [place]  Weight Used For Protein Calculations: 86.2 kg (190 lb 0.6 oz)  Protein Requirements: 130gm (1.5g/kg)  Fluid Requirements (mL): 1000ml + urinary output  Temp (24hrs), Av.6 °F (37 °C), Min:98.1 °F (36.7 °C), Max:98.9 °F (37.2 °C)    Vtot (L/Min) for [facility] Equation Calculation: 7.1    Enteral Nutrition    Formula: Novasource Renal  Rate/Volume: 35ml/hr  Water Flushes: 50ml q4hr  Additives/Modulars: Prosource No Carb and Malachi  Route: orogastric tube  Method: continuous  Total Nutrition Provided by Tube Feeding, Additives, and Flushes:  Calories Provided  1640 kcal/d, 96% needs   Protein Provided  123 g/d, 95% needs   Fluid Provided  504 ml/d, N/A% needs   Continuous feeding calculations based on estimated 20 hr/d run time unless otherwise stated.    Parenteral Nutrition    Patient not receiving parenteral nutrition support at this time.    Evaluation of Received Nutrient Intake    Calories: meeting estimated needs  Protein: meeting estimated needs    Patient Education    Not applicable.    Nutrition Diagnosis     PES: Inadequate oral intake related to acute illness as evidenced by intubation since 23. (continues)    Interventions/Goals     Intervention(s): collaboration with other providers  Goal: Meet greater than 75% of nutritional needs by follow-up. (goal progressing)    Monitoring & Evaluation     Dietitian will monitor energy intake.  Nutrition Risk/Follow-Up: high (follow-up in 1-4 days)   Please consult if re-assessment needed sooner.

## 2023-07-19 LAB
ALBUMIN SERPL-MCNC: 2.7 G/DL (ref 3.5–5)
BNP BLD-MCNC: 1206.7 PG/ML
BUN SERPL-MCNC: 79.1 MG/DL (ref 8.4–25.7)
CALCIUM SERPL-MCNC: 9.3 MG/DL (ref 8.4–10.2)
CHLORIDE SERPL-SCNC: 105 MMOL/L (ref 98–107)
CO2 SERPL-SCNC: 22 MMOL/L (ref 22–29)
CREAT SERPL-MCNC: 2.73 MG/DL (ref 0.73–1.18)
GFR SERPLBLD CREATININE-BSD FMLA CKD-EPI: 26 MLS/MIN/1.73/M2
GLUCOSE SERPL-MCNC: 135 MG/DL (ref 74–100)
MAGNESIUM SERPL-MCNC: 1.8 MG/DL (ref 1.6–2.6)
PHOSPHATE SERPL-MCNC: 4.4 MG/DL (ref 2.3–4.7)
POCT GLUCOSE: 111 MG/DL (ref 70–110)
POCT GLUCOSE: 137 MG/DL (ref 70–110)
POTASSIUM SERPL-SCNC: 3.2 MMOL/L (ref 3.5–5.1)
SODIUM SERPL-SCNC: 141 MMOL/L (ref 136–145)

## 2023-07-19 PROCEDURE — 25000003 PHARM REV CODE 250: Performed by: INTERNAL MEDICINE

## 2023-07-19 PROCEDURE — 94761 N-INVAS EAR/PLS OXIMETRY MLT: CPT

## 2023-07-19 PROCEDURE — 25000242 PHARM REV CODE 250 ALT 637 W/ HCPCS: Performed by: INTERNAL MEDICINE

## 2023-07-19 PROCEDURE — 99900026 HC AIRWAY MAINTENANCE (STAT)

## 2023-07-19 PROCEDURE — 25000242 PHARM REV CODE 250 ALT 637 W/ HCPCS

## 2023-07-19 PROCEDURE — C9248 INJ, CLEVIDIPINE BUTYRATE: HCPCS | Mod: JZ,JG

## 2023-07-19 PROCEDURE — 20000000 HC ICU ROOM

## 2023-07-19 PROCEDURE — 94668 MNPJ CHEST WALL SBSQ: CPT

## 2023-07-19 PROCEDURE — 63600175 PHARM REV CODE 636 W HCPCS

## 2023-07-19 PROCEDURE — 83880 ASSAY OF NATRIURETIC PEPTIDE: CPT | Performed by: INTERNAL MEDICINE

## 2023-07-19 PROCEDURE — 25000003 PHARM REV CODE 250

## 2023-07-19 PROCEDURE — 83735 ASSAY OF MAGNESIUM: CPT | Performed by: INTERNAL MEDICINE

## 2023-07-19 PROCEDURE — 94640 AIRWAY INHALATION TREATMENT: CPT

## 2023-07-19 PROCEDURE — 25000003 PHARM REV CODE 250: Performed by: STUDENT IN AN ORGANIZED HEALTH CARE EDUCATION/TRAINING PROGRAM

## 2023-07-19 PROCEDURE — 63600175 PHARM REV CODE 636 W HCPCS: Mod: JZ,EC,JG | Performed by: INTERNAL MEDICINE

## 2023-07-19 PROCEDURE — 63600175 PHARM REV CODE 636 W HCPCS: Performed by: INTERNAL MEDICINE

## 2023-07-19 PROCEDURE — 80069 RENAL FUNCTION PANEL: CPT | Performed by: INTERNAL MEDICINE

## 2023-07-19 PROCEDURE — 92610 EVALUATE SWALLOWING FUNCTION: CPT

## 2023-07-19 PROCEDURE — 27000221 HC OXYGEN, UP TO 24 HOURS

## 2023-07-19 PROCEDURE — 99900035 HC TECH TIME PER 15 MIN (STAT)

## 2023-07-19 PROCEDURE — 97535 SELF CARE MNGMENT TRAINING: CPT

## 2023-07-19 PROCEDURE — 25000003 PHARM REV CODE 250: Performed by: NURSE PRACTITIONER

## 2023-07-19 PROCEDURE — 63600175 PHARM REV CODE 636 W HCPCS: Mod: JZ,JG

## 2023-07-19 RX ORDER — LISINOPRIL 20 MG/1
10 TABLET ORAL NIGHTLY
Status: ON HOLD | COMMUNITY
Start: 2023-02-07 | End: 2023-08-09 | Stop reason: HOSPADM

## 2023-07-19 RX ORDER — ALBUTEROL SULFATE 0.83 MG/ML
2.5 SOLUTION RESPIRATORY (INHALATION) EVERY 6 HOURS
Status: DISCONTINUED | OUTPATIENT
Start: 2023-07-19 | End: 2023-07-21

## 2023-07-19 RX ORDER — AMLODIPINE BESYLATE 5 MG/1
10 TABLET ORAL DAILY
Status: DISCONTINUED | OUTPATIENT
Start: 2023-07-19 | End: 2023-07-24

## 2023-07-19 RX ORDER — IPRATROPIUM BROMIDE 0.5 MG/2.5ML
0.5 SOLUTION RESPIRATORY (INHALATION) EVERY 6 HOURS
Status: DISCONTINUED | OUTPATIENT
Start: 2023-07-19 | End: 2023-07-21

## 2023-07-19 RX ORDER — POTASSIUM CHLORIDE 20 MEQ/1
20 TABLET, EXTENDED RELEASE ORAL ONCE
Status: COMPLETED | OUTPATIENT
Start: 2023-07-19 | End: 2023-07-19

## 2023-07-19 RX ORDER — POTASSIUM CHLORIDE 14.9 MG/ML
20 INJECTION INTRAVENOUS
Status: COMPLETED | OUTPATIENT
Start: 2023-07-19 | End: 2023-07-19

## 2023-07-19 RX ORDER — LISINOPRIL 10 MG/1
10 TABLET ORAL DAILY
Status: DISCONTINUED | OUTPATIENT
Start: 2023-07-19 | End: 2023-07-21

## 2023-07-19 RX ADMIN — CLEVIPIDINE 7 MG/HR: 0.5 EMULSION INTRAVENOUS at 01:07

## 2023-07-19 RX ADMIN — OXYBUTYNIN CHLORIDE 5 MG: 5 TABLET ORAL at 09:07

## 2023-07-19 RX ADMIN — ATORVASTATIN CALCIUM 20 MG: 10 TABLET, FILM COATED ORAL at 09:07

## 2023-07-19 RX ADMIN — ALBUTEROL SULFATE 2.5 MG: 2.5 SOLUTION RESPIRATORY (INHALATION) at 12:07

## 2023-07-19 RX ADMIN — CLEVIPIDINE 8 MG/HR: 0.5 EMULSION INTRAVENOUS at 06:07

## 2023-07-19 RX ADMIN — HYDRALAZINE HYDROCHLORIDE 10 MG: 20 INJECTION INTRAMUSCULAR; INTRAVENOUS at 04:07

## 2023-07-19 RX ADMIN — LISINOPRIL 10 MG: 10 TABLET ORAL at 03:07

## 2023-07-19 RX ADMIN — SENNOSIDES AND DOCUSATE SODIUM 2 TABLET: 50; 8.6 TABLET ORAL at 10:07

## 2023-07-19 RX ADMIN — ALBUTEROL SULFATE 2.5 MG: 2.5 SOLUTION RESPIRATORY (INHALATION) at 08:07

## 2023-07-19 RX ADMIN — IPRATROPIUM BROMIDE 0.5 MG: 0.5 SOLUTION RESPIRATORY (INHALATION) at 08:07

## 2023-07-19 RX ADMIN — ENOXAPARIN SODIUM 30 MG: 30 INJECTION SUBCUTANEOUS at 09:07

## 2023-07-19 RX ADMIN — CLEVIPIDINE 8 MG/HR: 0.5 EMULSION INTRAVENOUS at 08:07

## 2023-07-19 RX ADMIN — MICONAZOLE NITRATE 2 % TOPICAL POWDER: at 09:07

## 2023-07-19 RX ADMIN — AMLODIPINE BESYLATE 10 MG: 5 TABLET ORAL at 02:07

## 2023-07-19 RX ADMIN — CLEVIPIDINE 4 MG/HR: 0.5 EMULSION INTRAVENOUS at 09:07

## 2023-07-19 RX ADMIN — FAMOTIDINE 20 MG: 10 INJECTION, SOLUTION INTRAVENOUS at 08:07

## 2023-07-19 RX ADMIN — OXYCODONE HYDROCHLORIDE AND ACETAMINOPHEN 1 TABLET: 10; 325 TABLET ORAL at 03:07

## 2023-07-19 RX ADMIN — LABETALOL HYDROCHLORIDE 10 MG: 5 INJECTION, SOLUTION INTRAVENOUS at 02:07

## 2023-07-19 RX ADMIN — CARVEDILOL 25 MG: 12.5 TABLET, FILM COATED ORAL at 09:07

## 2023-07-19 RX ADMIN — POTASSIUM CHLORIDE 20 MEQ: 14.9 INJECTION, SOLUTION INTRAVENOUS at 09:07

## 2023-07-19 RX ADMIN — ACETYLCYSTEINE 4 ML: 100 INHALANT RESPIRATORY (INHALATION) at 12:07

## 2023-07-19 RX ADMIN — ENOXAPARIN SODIUM 30 MG: 30 INJECTION SUBCUTANEOUS at 08:07

## 2023-07-19 RX ADMIN — DOXYCYCLINE HYCLATE 100 MG: 100 TABLET, COATED ORAL at 09:07

## 2023-07-19 RX ADMIN — POTASSIUM CHLORIDE 20 MEQ: 1500 TABLET, EXTENDED RELEASE ORAL at 05:07

## 2023-07-19 RX ADMIN — IPRATROPIUM BROMIDE 0.5 MG: 0.5 SOLUTION RESPIRATORY (INHALATION) at 12:07

## 2023-07-19 RX ADMIN — GUAIFENESIN 400 MG: 200 SOLUTION ORAL at 09:07

## 2023-07-19 RX ADMIN — ALBUTEROL SULFATE 2.5 MG: 2.5 SOLUTION RESPIRATORY (INHALATION) at 07:07

## 2023-07-19 RX ADMIN — ERYTHROPOIETIN 20000 UNITS: 10000 INJECTION, SOLUTION INTRAVENOUS; SUBCUTANEOUS at 09:07

## 2023-07-19 RX ADMIN — POTASSIUM CHLORIDE 20 MEQ: 14.9 INJECTION, SOLUTION INTRAVENOUS at 05:07

## 2023-07-19 NOTE — PROGRESS NOTES
[facility] - [facility] ICU  Pulmonary Critical Care Note    Patient Name: Bianca Khan  MRN: [id number]  Admission Date: 6/28/2023  Hospital Length of Stay: 21 days  Code Status: Full Code  Attending Provider: Khris Treadwell Jr., MD, *  Primary Care Provider: Areli Vargas PA-C     Subjective:     HPI:   Bianca Khan is a 58 y.o. male with PMH of paraplegia 2/2 spinal cord injury (T1-T6), neurogenic bladder with suprapubic catheter, chronic right ankle osteomyelitis, DM II, HTN, who presented to the ED on 6/28/2023 with CC of right knee pain. Per chart review, CT of right knee revealed 5 cm area of subcutaneous fluid in prepatellar region which was aspirated in the ED; he was taken to the OR on 6/29/2023 by orthopedic surgery team and was found to have prepatellar bursa infection and septic arthritis. Wound culture 6/29/2023 positive for strep agalactiae. Orthopedic surgery team recommended right-sided above-knee amputation d/t chronically infected hardware in right lower extremity. On 7/4/2023, a RRT was called d/t acute respiratory distress that was not improving despite being placed on vapotherm at 35 L/min with FiO2 100%. At the time of examination, patient's initial GCS was 10 but progressively reduced to a GCS of 6. Shared decision with patient's wife was made to intubate patient for airway protection though ABGs were within normal limits. He was admitted to the ICU for close monitoring and further medical management.    Hospital Course/Significant events:  6/29/2023 - I&D of right knee  7/4/2023 - Admitted to ICU, intubated for airway protection d/t altered mental status. Trialysis catheter placed and HD began  7/18/2023 - Extubated      24 Hour Interval History:  Patient extubated yesterday currently on 5 L OxyMask maintaining adequate oxygen saturation.  He had persistent hypertension overnight to 190 systolic refractory to p.r.n. hydralazine and labetalol. Cleviprex drip was started.   Creatinine bumped from 2.34-2.73 this morning.    Past Medical History:   Diagnosis Date    Arthritis     Chronic ulcer of ankle 05/26/2022    Frequent UTI 07/02/2019    Generalized anxiety disorder 05/26/2022    Neurogenic bladder 05/26/2022    Osteomyelitis 05/26/2022    Presence of suprapubic catheter 05/26/2022    Pure hypercholesterolemia 05/26/2022    Retention of urine, unspecified 08/09/2019    Spinal cord injury at T1-T6 level 04/20/2018       Past Surgical History:   Procedure Laterality Date    INCISION AND DRAINAGE, LOWER EXTREMITY Right 6/29/2023    Procedure: INCISION AND DRAINAGE, LOWER EXTREMITY;  Surgeon: Prabhu Shen DO;  Location: [facility];  Service: Orthopedics;  Laterality: Right;  supine bone foam wash stuff cultures    INSERTION OF INTRAMEDULLARY MARISA Right 8/10/2022    Procedure: INSERTION, INTRAMEDULLARY MARISA RIGHT TIBIA;  Surgeon: Jorge Orellana MD;  Location: [facility];  Service: Orthopedics;  Laterality: Right;       Social History     Socioeconomic History    Marital status:    Tobacco Use    Smoking status: Every Day     Types: Cigars, Cigarettes    Smokeless tobacco: Never   Substance and Sexual Activity    Alcohol use: Yes     Alcohol/week: 2.0 standard drinks     Types: 2 Cans of beer per week    Drug use: Not Currently    Sexual activity: Never       Current Outpatient Medications   Medication Instructions    amitriptyline (ELAVIL) 50 mg, Oral, Nightly    amLODIPine (NORVASC) 10 MG tablet 1 tablet    atorvastatin (LIPITOR) 20 mg, Oral, Nightly    busPIRone (BUSPAR) 5 MG Tab 1 tablet    carvediloL (COREG) 12.5 mg, Oral    cyclobenzaprine (FLEXERIL) 10 mg, Oral, 2 times daily PRN    doxycycline (VIBRAMYCIN) 100 mg, Oral, Daily    fenofibrate 160 mg, Oral    FLUoxetine 20 mg, Oral    gabapentin (NEURONTIN) 300 MG capsule 1 capsule    lisinopriL 10 mg, Oral, Daily    LORazepam (ATIVAN) 1 mg, Oral, 2 times daily    melatonin (MELATIN) 3 mg tablet TAKE TWO TABLETS BY MOUTH EVERY  NIGHT AT BEDTIME AS NEEDED FOR INSOMNIA.    meloxicam (MOBIC) 15 mg, Oral, Daily    metFORMIN (GLUCOPHAGE) 500 MG tablet SMARTSI Tablet(s) By Mouth Morning-Evening    oxybutynin (DITROPAN) 5 mg, Oral, 2 times daily    oxyCODONE-acetaminophen (PERCOCET)  mg per tablet 1 tablet, Oral, Every 6 hours PRN    pantoprazole (PROTONIX) 40 MG tablet 1 tablet    temazepam (RESTORIL) 30 mg, Oral, Nightly    traZODone (DESYREL) 50 mg, Oral, Nightly    XARELTO 20 mg Tab SMARTSI Tablet(s) By Mouth Every Evening     Current Inpatient Medications   acetylcysteine 100 mg/ml (10%)  4 mL Nebulization Q6H    albuterol sulfate  2.5 mg Nebulization Q6H    atorvastatin  20 mg Per NG tube Nightly    carvediloL  25 mg Per NG tube BID    cefTRIAXone (ROCEPHIN) IVPB  2 g Intravenous Q24H    doxycycline  100 mg Per NG tube Q12H    enoxaparin  30 mg Subcutaneous Q12H    epoetin franklin  20,000 Units Subcutaneous Every Mon, Wed, Fri    famotidine (PF)  20 mg Intravenous Daily    fenofibrate  48 mg Per NG tube Daily    guaiFENesin 100 mg/5 ml  400 mg Per NG tube Q4H    miconazole NITRATE 2 %   Topical (Top) BID    oxybutynin  5 mg Per NG tube BID    senna-docusate 8.6-50 mg  2 tablet Per NG tube BID    sodium citrate-citric acid 500-334 mg/5 ml  30 mL Oral Once     Current Intravenous Infusions   cisatracurium (NIMBEX) infusion Stopped (23 1450)    clevidipine 7 mg/hr (23 0345)    dexmedeTOMIDine (Precedex) infusion (titrating) 0.2 mcg/kg/hr (23)    NORepinephrine bitartrate-D5W Stopped (23 1039)    propofoL Stopped (23 1240)       Objective:     Intake/Output Summary (Last 24 hours) at 2023 6896  Last data filed at 2023 1400  Gross per 24 hour   Intake 1246 ml   Output 1475 ml   Net -229 ml       Vital Signs (Most Recent):  Temp: 98.8 °F (37.1 °C) (23)  Pulse: 89 (23)  Resp: (!) 21 (23)  BP: (!) 134/93 (23)  SpO2: 100 % (23)  Body mass  index is 27.51 kg/m².  Weight: 86.2 kg (190 lb) Vital Signs (24h Range):  Temp:  [98.6 °F (37 °C)-98.9 °F (37.2 °C)] 98.8 °F (37.1 °C)  Pulse:  [] 89  Resp:  [3-28] 21  SpO2:  [87 %-100 %] 100 %  BP: (119-205)/() 134/93     Physical Exam  Vitals and nursing note reviewed.   Constitutional:       Interventions: He is sedated.   HENT:      Head: Normocephalic and atraumatic.      Nose: Nose normal.   Cardiovascular:      Rate and Rhythm: Normal rate and regular rhythm.      Pulses: Normal pulses.      Heart sounds: Normal heart sounds.   Pulmonary:      Effort: No respiratory distress.      Breath sounds: Rhonchi present.      Comments: Coarse rhonchi throughout lung fields  Abdominal:      General: Bowel sounds are normal.      Palpations: Abdomen is soft.   Musculoskeletal:      Cervical back: Neck supple.      Comments: Wound VAC in place to the right knee draining bloody fluid with a total of 300 mL   Skin:     General: Skin is warm and dry.   Neurological:      Mental Status: He is alert.      Comments: Speech slow, able to understand speech with some effort.  No slurring. Chronic bilateral lower extremity paralysis.    Psychiatric:         Mood and Affect: Mood and affect normal.         Behavior: Behavior is cooperative.      Comments:        Lines/Drains/Airways       Central Venous Catheter Line  Duration                  Hemodialysis Catheter 07/14/23 1329 left internal jugular 4 days              Drain  Duration                  Suprapubic Catheter 07/06/23 1544 100% silicone 22 Fr. 12 days              Peripheral Intravenous Line  Duration                  Midline Catheter Insertion/Assessment  - Single Lumen 07/02/23 2128 Right basilic vein (medial side of arm) 16 days         Peripheral IV - Single Lumen 07/04/23 0600 18 G Anterior;Left Forearm 14 days         Peripheral IV - Single Lumen 07/04/23 0600 20 G Left;Posterior Hand 14 days                  Significant Labs:  Lab Results    Component Value Date    WBC 9.32 07/18/2023    HGB 9.4 (L) 07/18/2023    HCT 30.7 (L) 07/18/2023    MCV 83.0 07/18/2023     (H) 07/18/2023       BMP  Lab Results   Component Value Date     07/19/2023    K 3.2 (L) 07/19/2023    CHLORIDE 105 07/19/2023    CO2 22 07/19/2023    BUN 79.1 (H) 07/19/2023    CREATININE 2.73 (H) 07/19/2023    CALCIUM 9.3 07/19/2023    AGAP 9.0 08/29/2022    ESTGFRAFRICA 101 05/11/2022    EGFRNONAA >60 04/27/2022     ABG  Recent Labs   Lab 07/18/23  0611   PH 7.400   PO2 73.0*   HCO3 30.4       Mechanical Ventilation Support:  Vent Mode: SIMV (07/18/23 1031)  Ventilator Initiated: Yes (07/11/23 1420)  Set Rate: 6 BPM (07/18/23 1031)  Vt Set: 450 mL (07/18/23 1031)  Pressure Support: 12 cmH20 (07/18/23 1031)  PEEP/CPAP: 8 cmH20 (07/18/23 1031)  Oxygen Concentration (%): 45 (07/18/23 1200)  Peak Airway Pressure: 21 cmH20 (07/18/23 1031)  Total Ve: 8.4 L/m (07/18/23 1031)  F/VT Ratio<105 (RSBI): (!) 76.67 (07/18/23 1031)    Significant Imaging:  I have reviewed the pertinent imaging within the past 24 hours.    Assessment/Plan:     Assessment  Extensive bilateral pulmonary infiltrates  DX:  Infectious pneumonia versus ARDS versus hydrostatic/non hydrostatic pulmonary edema   Sputum culture showed moderate yeast  Intubated 07/04/2023  Extubated 07/18/2023  Prepatellar bursitis/septic arthritis of the right knee (strep agalactiae)   Post I&D on 06/29/2023   Ortho no longer planning for right above-the-knee amputation in near future due to infectious suppression  Acute renal failure, oliguric  Initiation of hemodialysis on 07/04/2023  Acute encephalopathy, likely metabolic related to above, currently clouded by sedation   Reported paroxysmal atrial fibrillation with permenant pacemaker, currently in sinus rhythm   H/o chronic paraplegia at T1 2/2 spinal cordy injury, neurogenic bladder, chronic right ankle osteomyelitis, DM2, HTN    Plan  -Continue ICU level of care for ongoing  monitoring and medical management  -ID, urology, orthopedics, wound care teams following, appreciate their assistance   -HD/Ultrafiltration as per nephrology  -per ID Merrem discontinued and switched to ceftriaxone with end date of 08/09 and continuing suppressive doxycycline with further plan of 6 week course for GBS septic arthritis and following inflammatory markers; repeat cultures (7/9/23) NGTD.  -Will trial methylnaltrexone for constipation, failed lactulose, only small amount of output over course of past few days with belly distension  -bronchoscopy revealed clot burden in the left lobe which was removed; oxygenation has improved.  Will limit suctioning  - Wean supplemental O2 as tolerated, currently on 5 L oxymask. CPT q4     DVT Prophylaxis: Lovenox 30   GI Prophylaxis: Pepcid 20    Jorge Alston MD PGY-II  Pulmonary Critical Care Medicine  [facility] - [facility] ICU

## 2023-07-19 NOTE — NURSING
Nurses Note -- 4 Eyes      7/19/2023   5:26 AM      Skin assessed during: Q Shift Change      [] No Altered Skin Integrity Present    []Prevention Measures Documented      [x] Yes- Altered Skin Integrity Present or Discovered   [] LDA Added if Not in Epic (Describe Wound)   [] New Altered Skin Integrity was Present on Admit and Documented in LDA   [] Wound Image Taken    Wound Care Consulted? Yes    Attending Nurse:  Jonelle Fay RN     Second RN/Staff Member:  KOLE Guerrero RN

## 2023-07-19 NOTE — PT/OT/SLP EVAL
Speech Language Pathology Department  Clinical Swallow Evaluation    Patient Name:  Bianca Khan   MRN:  [id number]    Recommendations:     General recommendations:  Modified Barium Swallow Study when appropriate   Diet recommendations:  NPO, Liquid Diet Level: NPO   Swallow strategies/precautions: medications crushed in puree  Precautions: Standard, fall, aspiration    History:     Bianca Khan is a 58 y.o. male with PMH of paraplegia 2/2 spinal cord injury (T1-T6), neurogenic bladder with suprapubic catheter, chronic right ankle osteomyelitis, DM II, HTN, who presented to the ED on 6/28/2023 with CC of right knee pain. On 7/4/2023, a RRT was called d/t acute respiratory distress resulting in intubation. Pt was extubated yesterday. SLP consulted for an evaluation of swallow function and aspiration risk. RN notified SLP pt's oxygen requirements have now decreased to 5L oxymask saturating between 90-95%.     Past Medical History:   Diagnosis Date    Arthritis     Chronic ulcer of ankle 05/26/2022    Frequent UTI 07/02/2019    Generalized anxiety disorder 05/26/2022    Neurogenic bladder 05/26/2022    Osteomyelitis 05/26/2022    Presence of suprapubic catheter 05/26/2022    Pure hypercholesterolemia 05/26/2022    Retention of urine, unspecified 08/09/2019    Spinal cord injury at T1-T6 level 04/20/2018     Past Surgical History:   Procedure Laterality Date    INCISION AND DRAINAGE, LOWER EXTREMITY Right 6/29/2023    Procedure: INCISION AND DRAINAGE, LOWER EXTREMITY;  Surgeon: Prabhu Shen DO;  [place] [facility] [place];  Service: Orthopedics;  Laterality: Right;  supine bone foam wash stuff cultures    INSERTION OF INTRAMEDULLARY MARISA Right 8/10/2022    Procedure: INSERTION, INTRAMEDULLARY MARISA RIGHT TIBIA;  Surgeon: Joreg Orellana MD;  Location[place] [facility] [place];  Service: Orthopedics;  Laterality: Right;     Prior Intubation HX:  7/4/23-7/18/23    Home Diet: Regular and thin liquids  Current Method of Nutrition: NPO    Patient  complaint: Pt denies any swallowing difficulties prior to this admission. His appears dysarthric but improving. Vocal quality is fairly strong despite intubation period.    Imaging   Results for orders placed during the hospital encounter of 06/28/23    X-Ray Chest 1 View    Narrative  EXAMINATION:  XR CHEST 1 VIEW    CLINICAL HISTORY:  hypoxia;    COMPARISON:  07/15/2023    FINDINGS:  Single view of the chest shows similar left greater than right effusions with adjacent atelectasis.  No pneumothorax.  Support equipment is unchanged.  Heart size is stable.    Impression  Stable chest      Electronically signed by: Dipak Multani MD  Date:    07/16/2023  Time:    10:39    No results found for this or any previous visit.    No results found for this or any previous visit.    Subjective     Patient alert and cooperative. He presents with some intermittent confusion.     Patient goals: to eat and drink by mouth     Spiritual/Cultural/Jain Beliefs/Practices that affect care: no  Pain/Comfort: Pain Rating 1: 0/10    Respiratory status: OxyMask  Restraints/positioning devices: none    Objective:     ORAL MUSCULATURE  Dentition: edentulous  Secretion Management: adequate  Mucosal Quality: good  Facial Movement: WFL  Buccal Strength & Mobility: WFL  Mandibular Strength & Mobility: WFL  Oral Labial Strength & Mobility: WFL  Lingual Strength & Mobility: WFL  Velar Elevation: WFL  Vocal Quality: hoarse  Volitional Cough: Weak  Volitional Swallow: present    Consistency Fed By Oral Symptoms Pharyngeal Symptoms   Ice chips SLP None None   Thin liquid by strawx2 trials Self None Cough after swallow of the second trial; increased work of breathing observed   Puree SLP Bolus holding None     Assessment:     SLP recommending a comprehensive evaluation of swallow function when appropriate. If pt is able to sustain his O2 levels and require less supplemental oxygen, SLP will provide a modified barium swallow study. Pt is okay for  meds crushed in puree texture at this time. He has a left IJ which may impact the imaging of a modified barium swallow study.     Goals:     Multidisciplinary Problems       SLP Goals       Not on file                  Patient Education:     Patient and spouse provided with verbal education regarding results/recommendtions.  Understanding was verbalized, however additional teaching warranted.    Plan:     SLP Follow-Up:  Yes   Patient to be seen:  daily   Plan of Care expires:     Plan of Care reviewed with:         Time Tracking:     SLP Treatment Date:   07/19/23  Speech Start Time:  1400  Speech Stop Time:  1430     Speech Total Time (min):  30 min    Billable minutes:  Swallow and Oral Function Evaluation, 15 minutes  Self Care/Home Management, 15 minutes     07/19/2023

## 2023-07-19 NOTE — PT/OT/SLP PROGRESS
Orders received, chart reviewed, and RN consulted. Pt alert and oriented to self and place. His speech appears dysarthric but he states this as his baseline. Pt requiring 10L oxygen via oxymask with a saturation level of 90-92% upon SLP arrival. He is not appropriate for PO trials given the degree of support he is requiring for maintaining his oxygen saturation levels at this time. SLP educated pt regarding POC. RN to notify SLP if pt's O2 status improves. Will continue to follow and evaluate when appropriate.

## 2023-07-19 NOTE — PROGRESS NOTES
Progress Note  Nephrology    Admit Date: 6/28/2023   LOS: 21 days     SUBJECTIVE:     Follow-up For:  ANTON / ATN    Interval History:  59 Y/O male with history of paraplegia , and neurogenic bladder with suprapubic cath with frequent infection admitted to hospital for Right knee infection and increased BUN and CR  Pt got intubated and place on Antibiotics and currently off of vent and extubated   Pts creatinin improved but bun today went up and also BNP increased , pt still stable and has no SOB    Review of Systems:  Constitutional: No fever or chills  Respiratory: No cough or shortness of breath  Cardiovascular: No chest pain or palpitations  Gastrointestinal: No nausea or vomiting  Neurological: No confusion or weakness    OBJECTIVE:     Vital Signs Range (Last 24H):  Vitals:    07/19/23 1600   BP:    Pulse: 72   Resp: (!) 28   Temp: 98.9 °F (37.2 °C)       Temp:  [98.1 °F (36.7 °C)-98.9 °F (37.2 °C)]   Pulse:  []   Resp:  [12-32]   BP: (119-205)/()   SpO2:  [87 %-100 %]     I & O (Last 24H):  Intake/Output Summary (Last 24 hours) at 7/19/2023 1653  Last data filed at 7/19/2023 1406  Gross per 24 hour   Intake 275 ml   Output 3100 ml   Net -2825 ml       Physical Exam:  Alert , oriented , in NAD  Head   normal   Neck   supple   Chest   symmetric   Lungs  clear   Heart   RRR  Abdomen   soft , non tender   Ext   no Edema   24 hr urine output 2400 cc      Laboratory Data:    Recent Labs   Lab 07/19/23  0148      K 3.2*   CO2 22   BUN 79.1*   CREATININE 2.73*   GLUCOSE 135*   CALCIUM 9.3   PHOS 4.4     Lab Results   Component Value Date    .3 (H) 07/04/2023    CALCIUM 9.3 07/19/2023    CAION 1.20 07/18/2023    PHOS 4.4 07/19/2023     Lab Results   Component Value Date    IRON 35 (L) 12/30/2021    TIBC 284 12/30/2021    FERRITIN 24.99 12/30/2021       Medications:  Medication list was reviewed and changes noted under Assessment/Plan.    Diagnostic Results:    Reviewed most recent  CT/US/Echo/MRI    ASSESSMENT/PLAN:   1   ANTON / ATN  2   CKD  stage unspecified  3   SHPT  4   paraplegia  5   anemia  6   HTN  7   A Fib  8   DM 2  9   Right Kneeinfection   10  Respiratory failure     off of vent     Plan   labs in AM including BNP  if high , will run dialysis

## 2023-07-19 NOTE — PLAN OF CARE
Problem: Adult Inpatient Plan of Care  Goal: Plan of Care Review  Outcome: Ongoing, Progressing     Problem: Adult Inpatient Plan of Care  Goal: Patient-Specific Goal (Individualized)  Outcome: Ongoing, Progressing     Problem: Adult Inpatient Plan of Care  Goal: Absence of Hospital-Acquired Illness or Injury  Outcome: Ongoing, Progressing     Problem: Adult Inpatient Plan of Care  Goal: Optimal Comfort and Wellbeing  Outcome: Ongoing, Progressing     Problem: Adult Inpatient Plan of Care  Goal: Readiness for Transition of Care  Outcome: Ongoing, Progressing     Problem: Skin Injury Risk Increased  Goal: Skin Health and Integrity  Outcome: Ongoing, Progressing

## 2023-07-20 LAB
ALBUMIN SERPL-MCNC: 3 G/DL (ref 3.5–5)
ALLENS TEST BLOOD GAS (OHS): YES
BASE EXCESS BLD CALC-SCNC: 7.2 MMOL/L
BIPAP(E) BLOOD GAS (OHS): 8 CM H2O
BIPAP(I) BLOOD GAS (OHS): 14 CM H2O
BLOOD GAS SAMPLE TYPE (OHS): ABNORMAL
BNP BLD-MCNC: 703 PG/ML
BUN SERPL-MCNC: 71.2 MG/DL (ref 8.4–25.7)
CA-I BLD-SCNC: 1.27 MMOL/L (ref 1.12–1.23)
CALCIUM SERPL-MCNC: 9.7 MG/DL (ref 8.4–10.2)
CHLORIDE SERPL-SCNC: 107 MMOL/L (ref 98–107)
CO2 BLDA-SCNC: 33.4 MMOL/L
CO2 SERPL-SCNC: 23 MMOL/L (ref 22–29)
CREAT SERPL-MCNC: 2.4 MG/DL (ref 0.73–1.18)
DRAWN BY BLOOD GAS (OHS): ABNORMAL
FIO2 BLOOD GAS (OHS): 80 %
GFR SERPLBLD CREATININE-BSD FMLA CKD-EPI: 30 MLS/MIN/1.73/M2
GLUCOSE SERPL-MCNC: 146 MG/DL (ref 74–100)
HCO3 BLDA-SCNC: 32 MMOL/L
MODE (OHS): ABNORMAL
PCO2 BLDA: 45 MMHG (ref 35–45)
PH BLDA: 7.46 [PH] (ref 7.35–7.45)
PHOSPHATE SERPL-MCNC: 3.1 MG/DL (ref 2.3–4.7)
PO2 BLDA: 86 MMHG (ref 80–100)
POCT GLUCOSE: 167 MG/DL (ref 70–110)
POTASSIUM BLOOD GAS (OHS): 3.3 MMOL/L (ref 3.5–5)
POTASSIUM SERPL-SCNC: 3.6 MMOL/L (ref 3.5–5.1)
SAMPLE SITE BLOOD GAS (OHS): ABNORMAL
SAO2 % BLDA: 97 %
SODIUM BLOOD GAS (OHS): 148 MMOL/L (ref 137–145)
SODIUM SERPL-SCNC: 148 MMOL/L (ref 136–145)

## 2023-07-20 PROCEDURE — 25000003 PHARM REV CODE 250: Performed by: STUDENT IN AN ORGANIZED HEALTH CARE EDUCATION/TRAINING PROGRAM

## 2023-07-20 PROCEDURE — 99900035 HC TECH TIME PER 15 MIN (STAT)

## 2023-07-20 PROCEDURE — 99900026 HC AIRWAY MAINTENANCE (STAT)

## 2023-07-20 PROCEDURE — 27000221 HC OXYGEN, UP TO 24 HOURS

## 2023-07-20 PROCEDURE — 25000242 PHARM REV CODE 250 ALT 637 W/ HCPCS: Performed by: INTERNAL MEDICINE

## 2023-07-20 PROCEDURE — C9248 INJ, CLEVIDIPINE BUTYRATE: HCPCS | Mod: JZ,JG

## 2023-07-20 PROCEDURE — 94660 CPAP INITIATION&MGMT: CPT

## 2023-07-20 PROCEDURE — 27000190 HC CPAP FULL FACE MASK W/VALVE

## 2023-07-20 PROCEDURE — 63600175 PHARM REV CODE 636 W HCPCS: Performed by: INTERNAL MEDICINE

## 2023-07-20 PROCEDURE — 25000003 PHARM REV CODE 250: Performed by: INTERNAL MEDICINE

## 2023-07-20 PROCEDURE — 20000000 HC ICU ROOM

## 2023-07-20 PROCEDURE — 63600175 PHARM REV CODE 636 W HCPCS: Mod: JZ,JG

## 2023-07-20 PROCEDURE — 94668 MNPJ CHEST WALL SBSQ: CPT

## 2023-07-20 PROCEDURE — 99900031 HC PATIENT EDUCATION (STAT)

## 2023-07-20 PROCEDURE — 83880 ASSAY OF NATRIURETIC PEPTIDE: CPT | Performed by: INTERNAL MEDICINE

## 2023-07-20 PROCEDURE — 25000003 PHARM REV CODE 250: Performed by: NURSE PRACTITIONER

## 2023-07-20 PROCEDURE — 80069 RENAL FUNCTION PANEL: CPT | Performed by: INTERNAL MEDICINE

## 2023-07-20 PROCEDURE — 94640 AIRWAY INHALATION TREATMENT: CPT

## 2023-07-20 PROCEDURE — 94761 N-INVAS EAR/PLS OXIMETRY MLT: CPT

## 2023-07-20 RX ORDER — POTASSIUM CHLORIDE 14.9 MG/ML
40 INJECTION INTRAVENOUS ONCE
Status: COMPLETED | OUTPATIENT
Start: 2023-07-20 | End: 2023-07-20

## 2023-07-20 RX ORDER — DEXTROSE MONOHYDRATE 50 MG/ML
INJECTION, SOLUTION INTRAVENOUS CONTINUOUS
Status: DISCONTINUED | OUTPATIENT
Start: 2023-07-20 | End: 2023-07-23

## 2023-07-20 RX ORDER — CLONIDINE 0.2 MG/24H
1 PATCH, EXTENDED RELEASE TRANSDERMAL
Status: DISCONTINUED | OUTPATIENT
Start: 2023-07-20 | End: 2023-08-09 | Stop reason: HOSPADM

## 2023-07-20 RX ADMIN — POTASSIUM CHLORIDE 40 MEQ: 14.9 INJECTION, SOLUTION INTRAVENOUS at 05:07

## 2023-07-20 RX ADMIN — CLEVIPIDINE 5 MG/HR: 0.5 EMULSION INTRAVENOUS at 04:07

## 2023-07-20 RX ADMIN — ALBUTEROL SULFATE 2.5 MG: 2.5 SOLUTION RESPIRATORY (INHALATION) at 08:07

## 2023-07-20 RX ADMIN — ALBUTEROL SULFATE 2.5 MG: 2.5 SOLUTION RESPIRATORY (INHALATION) at 01:07

## 2023-07-20 RX ADMIN — ALBUTEROL SULFATE 2.5 MG: 2.5 SOLUTION RESPIRATORY (INHALATION) at 12:07

## 2023-07-20 RX ADMIN — CLEVIPIDINE 8 MG/HR: 0.5 EMULSION INTRAVENOUS at 07:07

## 2023-07-20 RX ADMIN — CEFTRIAXONE SODIUM 2 G: 2 INJECTION, POWDER, FOR SOLUTION INTRAMUSCULAR; INTRAVENOUS at 11:07

## 2023-07-20 RX ADMIN — MICONAZOLE NITRATE 2 % TOPICAL POWDER: at 08:07

## 2023-07-20 RX ADMIN — CLEVIPIDINE 6 MG/HR: 0.5 EMULSION INTRAVENOUS at 12:07

## 2023-07-20 RX ADMIN — DEXTROSE MONOHYDRATE: 50 INJECTION, SOLUTION INTRAVENOUS at 03:07

## 2023-07-20 RX ADMIN — IPRATROPIUM BROMIDE 0.5 MG: 0.5 SOLUTION RESPIRATORY (INHALATION) at 08:07

## 2023-07-20 RX ADMIN — IPRATROPIUM BROMIDE 0.5 MG: 0.5 SOLUTION RESPIRATORY (INHALATION) at 12:07

## 2023-07-20 RX ADMIN — IPRATROPIUM BROMIDE 0.5 MG: 0.5 SOLUTION RESPIRATORY (INHALATION) at 01:07

## 2023-07-20 RX ADMIN — CLEVIPIDINE 8 MG/HR: 0.5 EMULSION INTRAVENOUS at 11:07

## 2023-07-20 RX ADMIN — CLEVIPIDINE 7 MG/HR: 0.5 EMULSION INTRAVENOUS at 08:07

## 2023-07-20 RX ADMIN — CLEVIPIDINE 8 MG/HR: 0.5 EMULSION INTRAVENOUS at 09:07

## 2023-07-20 RX ADMIN — DEXTROSE MONOHYDRATE: 50 INJECTION, SOLUTION INTRAVENOUS at 11:07

## 2023-07-20 RX ADMIN — FAMOTIDINE 20 MG: 10 INJECTION, SOLUTION INTRAVENOUS at 08:07

## 2023-07-20 RX ADMIN — ENOXAPARIN SODIUM 30 MG: 30 INJECTION SUBCUTANEOUS at 08:07

## 2023-07-20 RX ADMIN — CEFTRIAXONE SODIUM 2 G: 2 INJECTION, POWDER, FOR SOLUTION INTRAMUSCULAR; INTRAVENOUS at 01:07

## 2023-07-20 RX ADMIN — CLEVIPIDINE 7 MG/HR: 0.5 EMULSION INTRAVENOUS at 03:07

## 2023-07-20 RX ADMIN — CLONIDINE 1 PATCH: 0.2 PATCH TRANSDERMAL at 08:07

## 2023-07-20 NOTE — PROGRESS NOTES
Inpatient Nutrition Assessment    Admit Date: 6/28/2023   Total duration of encounter: 22 days     Nutrition Recommendation/Prescription     Tube feeding recommendation:   Impact Peptide 1.5 goal rate 70 ml/hr to provide  2100 kcal/d (96% est needs)  131 g protein/d (101% est needs)  1078 ml free water/d  (calculations based on estimated 20 hr/d run time)     Continue with Malachi (provides 90 kcal, 2.5 g protein per serving) BID.    Communication of Recommendations: reviewed with nurse    Nutrition Assessment     Malnutrition Assessment/Nutrition-Focused Physical Exam    Malnutrition Context: chronic illness  Malnutrition Level:  (does not meet criteria)  Energy Intake (Malnutrition):  (unable to obtain)  Weight Loss (Malnutrition):  (unable to obtain)  Subcutaneous Fat (Malnutrition):  (does not meet criteria)           Muscle Mass (Malnutrition):  (does not meet criteria)                          Fluid Accumulation (Malnutrition):  (does not meet criteria)        A minimum of two characteristics is recommended for diagnosis of either severe or non-severe malnutrition.    Chart Review    Reason Seen: continuous nutrition monitoring and follow-up    Malnutrition Screening Tool Results   Have you recently lost weight without trying?: Unsure  Have you been eating poorly because of a decreased appetite?: Yes   MST Score: 3     Diagnosis:  Bilateral Pulmonary Infiltrates   Uremic encephalopathy 2/2 acute renal failure  Acute renal failure  Hyponatremia  Septic arthritis    Relevant Medical History: paraplegia 2/2 spinal cord injury (T1-T6), neurogenic bladder with suprapubic catheter, chronic right ankle osteomyelitis, DM II, HTN    Nutrition-Related Medications:   cleviprex, senna-docusate (plans for relistor per MD notes)    Calorie Containing IV Medications: Cleviprex @ 12 ml/hr (provides 575 kcal/d)    Nutrition-Related Labs:  7/5 Na 128, K 5.3, Glu 93, BUN 39.8, Crea 7.07, Phos 7.1, GFR 8  7/11 Na 133, BUN 75.8,  "Crea 4.3, Glu 121, Phos 6.5  7/14 .2, Crea 4.09, Glu 118, Phos 5.9  7/18 BUN 59.5, Crea 2.34, Glu 119, Phos 5.3    Diet/PN Order: No diet orders on file  Oral Supplement Order: none  Tube Feeding Order: none  Appetite/Oral Intake: not applicable/not applicable  Factors Affecting Nutritional Intake: respiratory status  Food/Episcopal/Cultural Preferences: unable to obtain  Food Allergies: none reported    Skin Integrity: drain/device(s)  Wound(s):      Altered Skin Integrity 06/28/23 2230 medial Sacral spine #1-Tissue loss description: Partial thickness       Altered Skin Integrity 06/28/23 2230 Left posterior Buttocks #4-Tissue loss description: Partial thickness       Altered Skin Integrity 06/28/23 2230 Right lateral Ankle #6-Tissue loss description: Full thickness     Comments    7/5/23: Discussed with RN. Will provide tube feeding recommendations for when appropriate to start tube feeding. Receiving kcal from meds. Noted K and Phos, will need renal formula at this time. Will add supplemental protein and Malachi for wound healing.     7/11/23: TF continues, tolerated @ goal rate.     7/14/23: TF continues, tolerated per RN.     7/18/23: TF continues, tolerated per RN. Possible plans for vent weaning today. Still receiving kcal from meds. Noted increase in diprivan. If remains at increased rate, will adjust TF rate to not overfeed.    7/20/23: Tf no longer running. NG pulled out by pt. Now on BIPAP and not able to replace tube. Discussed with RN, can wait a few days, but will eventually need to consider reinserting NG vs. PN if not able to insert NG.     Anthropometrics    Height: 5' 9.69" (177 cm) Height Method: Stated  Last Weight: 86.2 kg (190 lb) (07/04/23 1114) Weight Method: Standard Scale (stated)  BMI (Calculated): 27.5  BMI Classification: overweight (BMI 25-29.9)        Ideal Body Weight (IBW), Male: 164.14 lb     % Ideal Body Weight, Male (lb): 115.75 %                          Usual Weight " Provided By: unable to obtain usual weight    Wt Readings from Last 5 Encounters:   23 86.2 kg (190 lb)   22 86.2 kg (190 lb)   22 86.2 kg (190 lb 0.6 oz)   22 86.2 kg (189 lb 15.9 oz)     Weight Change(s) Since Admission:  Admit Weight: 86.2 kg (190 lb) (23 1346)  23: no new  23: no new  23: no new    Estimated Needs    Weight Used For Calorie Calculations: 86.2 kg (190 lb 0.6 oz)  Energy Calorie Requirements (kcal): 2181kcal (1.3 stress factor)  Energy Need Method: [place]-St Jeor  Weight Used For Protein Calculations: 86.2 kg (190 lb 0.6 oz)  Protein Requirements: 130gm (1.5g/kg)  Fluid Requirements (mL): 1000ml + urinary output  Temp (24hrs), Av.8 °F (37.1 °C), Min:98.4 °F (36.9 °C), Max:98.9 °F (37.2 °C)    Vtot (L/Min) for [place] Equation Calculation: 7.1    Enteral Nutrition    Patient not receiving enteral nutrition support at this time.     Parenteral Nutrition    Patient not receiving parenteral nutrition support at this time.    Evaluation of Received Nutrient Intake    Calories: not meeting estimated needs  Protein: not meeting estimated needs    Patient Education    Not applicable.    Nutrition Diagnosis     PES: Inadequate oral intake related to acute illness as evidenced by NPO post extubation. (continues)    Interventions/Goals     Intervention(s): collaboration with other providers  Goal: Meet greater than 75% of nutritional needs by follow-up. (goal progressing)    Monitoring & Evaluation     Dietitian will monitor energy intake.  Nutrition Risk/Follow-Up: high (follow-up in 1-4 days)   Please consult if re-assessment needed sooner.

## 2023-07-20 NOTE — PROGRESS NOTES
[facility] - [facility] ICU  Pulmonary Critical Care Note    Patient Name: Bianca Khan  MRN: [id number]  Admission Date: 6/28/2023  Hospital Length of Stay: 22 days  Code Status: Full Code  Attending Provider: Khris Treadwell Jr., MD, *  Primary Care Provider: Areli Vargas PA-C     Subjective:     HPI:   Bianca Khan is a 58 y.o. male with PMH of paraplegia 2/2 spinal cord injury (T1-T6), neurogenic bladder with suprapubic catheter, chronic right ankle osteomyelitis, DM II, HTN, who presented to the ED on 6/28/2023 with CC of right knee pain. Per chart review, CT of right knee revealed 5 cm area of subcutaneous fluid in prepatellar region which was aspirated in the ED; he was taken to the OR on 6/29/2023 by orthopedic surgery team and was found to have prepatellar bursa infection and septic arthritis. Wound culture 6/29/2023 positive for strep agalactiae. Orthopedic surgery team recommended right-sided above-knee amputation d/t chronically infected hardware in right lower extremity. On 7/4/2023, a RRT was called d/t acute respiratory distress that was not improving despite being placed on vapotherm at 35 L/min with FiO2 100%. At the time of examination, patient's initial GCS was 10 but progressively reduced to a GCS of 6. Shared decision with patient's wife was made to intubate patient for airway protection though ABGs were within normal limits. He was admitted to the ICU for close monitoring and further medical management.    Hospital Course/Significant events:  6/29/2023 - I&D of right knee  7/4/2023 - Admitted to ICU, intubated for airway protection d/t altered mental status. Trialysis catheter placed and HD began  7/18/2023 - Extubated    24 Hour Interval History:  Remains persistently hypertensive on Cleviprex drip however blood pressure improved from previous.  Had a desaturation overnight to 78% which improved with CPT, currently on 5 L OxyMask.    Past Medical History:   Diagnosis Date     Arthritis     Chronic ulcer of ankle 05/26/2022    Frequent UTI 07/02/2019    Generalized anxiety disorder 05/26/2022    Neurogenic bladder 05/26/2022    Osteomyelitis 05/26/2022    Presence of suprapubic catheter 05/26/2022    Pure hypercholesterolemia 05/26/2022    Retention of urine, unspecified 08/09/2019    Spinal cord injury at T1-T6 level 04/20/2018       Past Surgical History:   Procedure Laterality Date    INCISION AND DRAINAGE, LOWER EXTREMITY Right 6/29/2023    Procedure: INCISION AND DRAINAGE, LOWER EXTREMITY;  Surgeon: Prabhu Shen DO;  Location: [facility];  Service: Orthopedics;  Laterality: Right;  supine bone foam wash stuff cultures    INSERTION OF INTRAMEDULLARY MARISA Right 8/10/2022    Procedure: INSERTION, INTRAMEDULLARY MARISA RIGHT TIBIA;  Surgeon: Jorge Orellana MD;  Location: [facility];  Service: Orthopedics;  Laterality: Right;       Social History     Socioeconomic History    Marital status:    Tobacco Use    Smoking status: Every Day     Types: Cigars, Cigarettes    Smokeless tobacco: Never   Substance and Sexual Activity    Alcohol use: Yes     Alcohol/week: 2.0 standard drinks     Types: 2 Cans of beer per week    Drug use: Not Currently    Sexual activity: Never       Current Outpatient Medications   Medication Instructions    amitriptyline (ELAVIL) 50 mg, Oral, Nightly    amLODIPine (NORVASC) 10 MG tablet 1 tablet    atorvastatin (LIPITOR) 20 mg, Oral, Nightly    busPIRone (BUSPAR) 5 MG Tab 1 tablet    carvediloL (COREG) 12.5 mg, Oral    cyclobenzaprine (FLEXERIL) 10 mg, Oral, 2 times daily PRN    doxycycline (VIBRAMYCIN) 100 mg, Oral, Daily    fenofibrate 160 mg, Oral    FLUoxetine 20 mg, Oral    gabapentin (NEURONTIN) 300 MG capsule 1 capsule    lisinopriL (PRINIVIL,ZESTRIL) 10 mg, Oral, Nightly    lisinopriL 10 mg, Oral, Daily    LORazepam (ATIVAN) 1 mg, Oral, 2 times daily    melatonin (MELATIN) 3 mg tablet TAKE TWO TABLETS BY MOUTH EVERY NIGHT AT BEDTIME AS NEEDED FOR INSOMNIA.     meloxicam (MOBIC) 15 mg, Oral, Daily    metFORMIN (GLUCOPHAGE) 500 MG tablet SMARTSI Tablet(s) By Mouth Morning-Evening    oxybutynin (DITROPAN) 5 mg, Oral, 2 times daily    oxyCODONE-acetaminophen (PERCOCET)  mg per tablet 1 tablet, Oral, Every 6 hours PRN    pantoprazole (PROTONIX) 40 MG tablet 1 tablet    temazepam (RESTORIL) 30 mg, Oral, Nightly    traZODone (DESYREL) 50 mg, Oral, Nightly    XARELTO 20 mg Tab SMARTSI Tablet(s) By Mouth Every Evening     Current Inpatient Medications   albuterol sulfate  2.5 mg Nebulization Q6H    And    ipratropium  0.5 mg Nebulization Q6H    amLODIPine  10 mg Oral Daily    atorvastatin  20 mg Per NG tube Nightly    carvediloL  25 mg Per NG tube BID    cefTRIAXone (ROCEPHIN) IVPB  2 g Intravenous Q24H    doxycycline  100 mg Per NG tube Q12H    enoxaparin  30 mg Subcutaneous Q12H    epoetin franklin  20,000 Units Subcutaneous Every Mon, Wed, Fri    famotidine (PF)  20 mg Intravenous Daily    fenofibrate  48 mg Per NG tube Daily    guaiFENesin 100 mg/5 ml  400 mg Per NG tube Q4H    lisinopriL  10 mg Oral Daily    miconazole NITRATE 2 %   Topical (Top) BID    oxybutynin  5 mg Per NG tube BID    senna-docusate 8.6-50 mg  2 tablet Per NG tube BID    sodium citrate-citric acid 500-334 mg/5 ml  30 mL Oral Once     Current Intravenous Infusions   cisatracurium (NIMBEX) infusion Stopped (23 1450)    clevidipine 6 mg/hr (23 0530)    dexmedeTOMIDine (Precedex) infusion (titrating) 0.2 mcg/kg/hr (23)    NORepinephrine bitartrate-D5W Stopped (23 1039)    propofoL Stopped (23 1240)     Objective:     Intake/Output Summary (Last 24 hours) at 2023 0603  Last data filed at 2023 0500  Gross per 24 hour   Intake 275 ml   Output 3975 ml   Net -3700 ml       Vital Signs (Most Recent):  Temp: 98.9 °F (37.2 °C) (23 0400)  Pulse: 108 (23)  Resp: (!) 26 (23)  BP: (!) 168/102 (23)  SpO2: (!) 90 % (23  0530)  Body mass index is 27.51 kg/m².  Weight: 86.2 kg (190 lb) Vital Signs (24h Range):  Temp:  [98.1 °F (36.7 °C)-98.9 °F (37.2 °C)] 98.9 °F (37.2 °C)  Pulse:  [] 108  Resp:  [15-32] 26  SpO2:  [85 %-100 %] 90 %  BP: (133-171)/() 168/102     Physical Exam  Vitals and nursing note reviewed.   Constitutional:       Interventions: He is sedated.   HENT:      Head: Normocephalic and atraumatic.      Nose: Nose normal.   Cardiovascular:      Rate and Rhythm: Normal rate and regular rhythm.      Pulses: Normal pulses.      Heart sounds: Normal heart sounds.   Pulmonary:      Effort: No respiratory distress.      Breath sounds: Rhonchi present.      Comments: Coarse rhonchi throughout lung fields. OxyMask at 5 L.  Abdominal:      General: Bowel sounds are normal.      Palpations: Abdomen is soft.   Musculoskeletal:      Cervical back: Neck supple.      Comments: Wound VAC in place to the right knee draining bloody fluid with a total of 300 mL   Skin:     General: Skin is warm and dry.      Capillary Refill: Capillary refill takes less than 2 seconds.   Neurological:      Mental Status: He is alert.      Comments: Speech slow, able to understand speech with some effort.  No slurring. Chronic bilateral lower extremity paralysis.    Psychiatric:         Mood and Affect: Mood and affect normal.         Behavior: Behavior is cooperative.      Comments:        Lines/Drains/Airways       Central Venous Catheter Line  Duration                  Hemodialysis Catheter 07/14/23 1329 left internal jugular 5 days              Drain  Duration                  Suprapubic Catheter 07/06/23 1544 100% silicone 22 Fr. 13 days              Peripheral Intravenous Line  Duration                  Midline Catheter Insertion/Assessment  - Single Lumen 07/02/23 2128 Right basilic vein (medial side of arm) 17 days         Peripheral IV - Single Lumen 07/04/23 0600 18 G Anterior;Left Forearm 16 days         Peripheral IV - Single Lumen  07/04/23 0600 20 G Left;Posterior Hand 16 days                  Significant Labs:  Lab Results   Component Value Date    WBC 9.32 07/18/2023    HGB 9.4 (L) 07/18/2023    HCT 30.7 (L) 07/18/2023    MCV 83.0 07/18/2023     (H) 07/18/2023       BMP  Lab Results   Component Value Date     (H) 07/20/2023    K 3.6 07/20/2023    CHLORIDE 107 07/20/2023    CO2 23 07/20/2023    BUN 71.2 (H) 07/20/2023    CREATININE 2.40 (H) 07/20/2023    CALCIUM 9.7 07/20/2023    AGAP 9.0 08/29/2022    ESTGFRAFRICA 101 05/11/2022    EGFRNONAA >60 04/27/2022     ABG  Recent Labs   Lab 07/18/23  0611   PH 7.400   PO2 73.0*   HCO3 30.4       Mechanical Ventilation Support:  Vent Mode: SIMV (07/18/23 1031)  Ventilator Initiated: Yes (07/11/23 1420)  Set Rate: 6 BPM (07/18/23 1031)  Vt Set: 450 mL (07/18/23 1031)  Pressure Support: 12 cmH20 (07/18/23 1031)  PEEP/CPAP: 8 cmH20 (07/18/23 1031)  Oxygen Concentration (%): 45 (07/18/23 1200)  Peak Airway Pressure: 21 cmH20 (07/18/23 1031)  Total Ve: 8.4 L/m (07/18/23 1031)  F/VT Ratio<105 (RSBI): (!) 76.67 (07/18/23 1031)    Significant Imaging:  I have reviewed the pertinent imaging within the past 24 hours.    Assessment/Plan:     Assessment  Extensive bilateral pulmonary infiltrates  DX: Infectious pneumonia versus ARDS versus hydrostatic/non hydrostatic pulmonary edema   Sputum culture showed moderate yeast  Intubated 07/04/2023  Extubated 07/18/2023  Prepatellar bursitis/septic arthritis of the right knee (strep agalactiae)   Post I&D on 06/29/2023   Ortho no longer planning for right above-the-knee amputation in near future due to infectious suppression  Acute renal failure, oliguric  Initiation of hemodialysis on 07/04/2023  Acute encephalopathy, likely metabolic related to above, currently clouded by sedation   Reported paroxysmal atrial fibrillation with permenant pacemaker, currently in sinus rhythm   H/o chronic paraplegia at T1 2/2 spinal cordy injury, neurogenic bladder,  chronic right ankle osteomyelitis, DM2, HTN    Plan  -Continue ICU level of care for ongoing monitoring and medical management  -ID, urology, orthopedics, wound care teams following, appreciate their assistance   -HD/Ultrafiltration as per nephrology  -per ID Merrem discontinued and switched to ceftriaxone with end date of 08/09 and continuing suppressive doxycycline with further plan of 6 week course for GBS septic arthritis and following inflammatory markers; repeat cultures (7/9/23) NGTD.  -Will trial methylnaltrexone for constipation, failed lactulose, only small amount of output over course of past few days with belly distension  - Bronchoscopy revealed clot burden in the left lobe which was removed; oxygenation has improved.  Will limit suctioning  - Wean supplemental O2 as tolerated, currently on  5 L oxymask. CPT q4   - Norvasc 10 mg, attempting to wean off Cleviprex    DVT Prophylaxis: Lovenox 30   GI Prophylaxis: Pepcid 20    Jorge Alston MD PGY-II  Pulmonary Critical Care Medicine  [facility] - [address] [place] [address]

## 2023-07-20 NOTE — PROGRESS NOTES
Progress Note  Nephrology    Admit Date: 6/28/2023   LOS: 22 days     SUBJECTIVE:     Follow-up For:  ANTON / ATN    Interval History:  59 Y/O male with history of paraplegia and suprapubic cath with frequent infection admitted to hospital for Right knee infection and also had increased BUN and CR   Had  a few dialyisis but currently has good urine output   Had 3975 cc of urine output last 24 hrs     Review of Systemspt was confused this AM also was placed on BIPAP   Constitutional: No fever or chills  Respiratory: No cough or shortness of breath  Cardiovascular: No chest pain or palpitations  Gastrointestinal: No nausea or vomiting  Neurological: No confusion or weakness  Has suprapubic  catheter with high urine out put    OBJECTIVE:     Vital Signs Range (Last 24H):  Vitals:    07/20/23 1430   BP: (!) 162/96   Pulse: 99   Resp: (!) 30   Temp:        Temp:  [98.4 °F (36.9 °C)-98.9 °F (37.2 °C)]   Pulse:  []   Resp:  [15-41]   BP: (115-172)/()   SpO2:  [83 %-100 %]     I & O (Last 24H):  Intake/Output Summary (Last 24 hours) at 7/20/2023 1545  Last data filed at 7/20/2023 1302  Gross per 24 hour   Intake 211.7 ml   Output 3425 ml   Net -3213.3 ml       Physical Exam:  Alert , confused   On BIPAP  Head   normal   Neck   supple   Chest   symmetric   Lungs   clear   Heart   RRR  Abdomen   soft , non tender   Ext   no edema     Laboratory Data:    Recent Labs   Lab 07/20/23  0207   *   K 3.6   CO2 23   BUN 71.2*   CREATININE 2.40*   GLUCOSE 146*   CALCIUM 9.7   PHOS 3.1     Lab Results   Component Value Date    .3 (H) 07/04/2023    CALCIUM 9.7 07/20/2023    CAION 1.27 (H) 07/20/2023    PHOS 3.1 07/20/2023     Lab Results   Component Value Date    IRON 35 (L) 12/30/2021    TIBC 284 12/30/2021    FERRITIN 24.99 12/30/2021       Medications:  Medication list was reviewed and changes noted under Assessment/Plan.    Diagnostic Results:    Reviewed most recent CT/US/Echo/MRI    ASSESSMENT/PLAN:   1    ANTON / ATN  2   CKD 4  3   SHPT  4   hypernatremia and dehydration today  5   paraplegia  6   HTN  7   A Fib  8   DM2  9   Right knee infection   10   Respiratory failure on BIPAP    Plan    BUN and CR and BNP is improving slowly and urine output very good   will hold dialysis for now   Will start D5W due to hypernatremia   Labs in AM

## 2023-07-20 NOTE — PT/OT/SLP PROGRESS
SLP spoke with RN and reviewed MD's notes this morning. Pt with a decline in respiratory status requiring BiPAP for maintenance of his respiratory status. He is not appropriate for PO intake at this time secondary to his respiratory status. Pt may benefit from a non-oral source of intake to meet his nutrition/medical needs. Will follow and evaluate as appropriate.

## 2023-07-20 NOTE — PROGRESS NOTES
Infectious Diseases Progress Note  58-year-old male with past medical history of T-spine cord injury with paraplegia, diabetes, HTN, hepatitis-C, chronic right ankle wound/osteomyelitis, was on suppressive doxycycline, known to my service, seen by us initially at [facility] and has followed with us at the office for chronic osteomyelitis, is admitted to [facility] on 06/28/2023 presenting through the ED with complaints of pain and swelling to his right knee, apparently struck his knee.  He did also report prior remote right knee surgery.  He was evaluated and noted to have no fevers initially but a temperature of 100.2° today 6/29, no leukocytosis but with thrombocytosis of up to 531 today.  .2 and ESR >130.  He is anemic with low albumin.  Blood cultures have been negative.  CT scan of the right knee noted a 5 cm area of subcutaneous fluid collection in the prepatellar region.  There was aspiration in the ER with findings of purulent fluid and cultures showing group B Streptococcus.  He was seen by the orthopedic surgery team with findings of right prepatellar bursa abscess and is status post incision and drainage of right knee septic arthritis and right prepatellar bursa abscess today 6/29 with group B Streptococcus    Subjective:  Remains in ICU. Lying in bed in no acute distress. No new complaints reported. Afebrile. Currently on 5L Oxymask    Review of Systems   Constitutional:  Positive for malaise/fatigue.   HENT: Negative.     Eyes: Negative.    Respiratory: Negative.     Cardiovascular: Negative.    Gastrointestinal: Negative.    Musculoskeletal: Negative.    Skin:         Multiple wounds   Neurological:  Positive for focal weakness and weakness.   All other systems reviewed and are negative.    Review of patient's allergies indicates:   Allergen Reactions    Baclofen Itching and Anxiety       Past Medical History:   Diagnosis Date    Arthritis     Chronic ulcer  of ankle 05/26/2022    Frequent UTI 07/02/2019    Generalized anxiety disorder 05/26/2022    Neurogenic bladder 05/26/2022    Osteomyelitis 05/26/2022    Presence of suprapubic catheter 05/26/2022    Pure hypercholesterolemia 05/26/2022    Retention of urine, unspecified 08/09/2019    Spinal cord injury at T1-T6 level 04/20/2018       Past Surgical History:   Procedure Laterality Date    INCISION AND DRAINAGE, LOWER EXTREMITY Right 6/29/2023    Procedure: INCISION AND DRAINAGE, LOWER EXTREMITY;  Surgeon: Prabhu Shen DO;  Location: [facility];  Service: Orthopedics;  Laterality: Right;  supine bone foam wash stuff cultures    INSERTION OF INTRAMEDULLARY MARISA Right 8/10/2022    Procedure: INSERTION, INTRAMEDULLARY MARISA RIGHT TIBIA;  Surgeon: Jorge Orellana MD;  Location: [facility];  Service: Orthopedics;  Laterality: Right;       Social History     Socioeconomic History    Marital status:    Tobacco Use    Smoking status: Every Day     Types: Cigars, Cigarettes    Smokeless tobacco: Never   Substance and Sexual Activity    Alcohol use: Yes     Alcohol/week: 2.0 standard drinks     Types: 2 Cans of beer per week    Drug use: Not Currently    Sexual activity: Never         Scheduled Meds:   albuterol sulfate  2.5 mg Nebulization Q6H    And    ipratropium  0.5 mg Nebulization Q6H    amLODIPine  10 mg Oral Daily    atorvastatin  20 mg Per NG tube Nightly    carvediloL  25 mg Per NG tube BID    cefTRIAXone (ROCEPHIN) IVPB  2 g Intravenous Q24H    doxycycline  100 mg Per NG tube Q12H    enoxaparin  30 mg Subcutaneous Q12H    epoetin franklin  20,000 Units Subcutaneous Every Mon, Wed, Fri    famotidine (PF)  20 mg Intravenous Daily    fenofibrate  48 mg Per NG tube Daily    guaiFENesin 100 mg/5 ml  400 mg Per NG tube Q4H    lisinopriL  10 mg Oral Daily    miconazole NITRATE 2 %   Topical (Top) BID    oxybutynin  5 mg Per NG tube BID    potassium chloride  20 mEq Intravenous Q2H    senna-docusate 8.6-50 mg  2 tablet Per NG  "tube BID    sodium citrate-citric acid 500-334 mg/5 ml  30 mL Oral Once     Continuous Infusions:   cisatracurium (NIMBEX) infusion Stopped (23 1450)    clevidipine 4 mg/hr (23 1900)    dexmedeTOMIDine (Precedex) infusion (titrating) 0.2 mcg/kg/hr (23)    NORepinephrine bitartrate-D5W Stopped (23 1039)    propofoL Stopped (23 1240)     PRN Meds:sodium chloride, acetaminophen, [] fentaNYL **FOLLOWED BY** fentaNYL, hydrALAZINE, labetalol, oxyCODONE-acetaminophen, sodium chloride 0.9%, sodium chloride 0.9%    Objective:  BP (!) 156/84   Pulse 100   Temp 98.9 °F (37.2 °C) (Oral)   Resp (!) 24   Ht 5' 9.69" (1.77 m)   Wt 86.2 kg (190 lb)   SpO2 (!) 94%   BMI 27.51 kg/m²     Physical Exam:   Physical Exam  Vitals reviewed.   HENT:      Head: Normocephalic.   Cardiovascular:      Rate and Rhythm: Normal rate and regular rhythm.   Pulmonary:      Effort: Pulmonary effort is normal. No respiratory distress.      Breath sounds: Normal breath sounds. No wheezing.      Comments: Currently on 5L Oxymask  Abdominal:      General: Bowel sounds are normal. There is no distension.      Palpations: Abdomen is soft.      Tenderness: There is no abdominal tenderness.   Genitourinary:     Comments: Suprapubic catheter  Musculoskeletal:      Cervical back: Normal range of motion.      Comments: Paraplegic   Skin:     Findings: No rash.      Comments: Wounds dressed. R knee with wound vac in place   Neurological:      Mental Status: He is alert and oriented to person, place, and time.   Psychiatric:         Mood and Affect: Mood normal.         Behavior: Behavior normal.       Imaging      Lab Review   Recent Results (from the past 24 hour(s))   Renal Function Panel    Collection Time: 23  1:48 AM   Result Value Ref Range    Sodium Level 141 136 - 145 mmol/L    Potassium Level 3.2 (L) 3.5 - 5.1 mmol/L    Chloride 105 98 - 107 mmol/L    Carbon Dioxide 22 22 - 29 mmol/L    Glucose Level " 135 (H) 74 - 100 mg/dL    Blood Urea Nitrogen 79.1 (H) 8.4 - 25.7 mg/dL    Creatinine 2.73 (H) 0.73 - 1.18 mg/dL    Calcium Level Total 9.3 8.4 - 10.2 mg/dL    Albumin Level 2.7 (L) 3.5 - 5.0 g/dL    Phosphorus Level 4.4 2.3 - 4.7 mg/dL    eGFR 26 mls/min/1.73/m2   Magnesium    Collection Time: 07/19/23  1:48 AM   Result Value Ref Range    Magnesium Level 1.80 1.60 - 2.60 mg/dL   BNP    Collection Time: 07/19/23  1:48 AM   Result Value Ref Range    Natriuretic Peptide 1,206.7 (H) <=100.0 pg/mL   POCT glucose    Collection Time: 07/19/23 11:23 AM   Result Value Ref Range    POCT Glucose 137 (H) 70 - 110 mg/dL   POCT glucose    Collection Time: 07/19/23  6:03 PM   Result Value Ref Range    POCT Glucose 111 (H) 70 - 110 mg/dL       Assessment/Plan:  1. SIRS with fevers  2. Group B Streptococcus Right knee prepatellar abscess  3. Group B Streptococcus Right knee septic arthritis  4.  Anemia/reactive thrombocytosis  5.  Paraplegia secondary to T-spine cord injury  6. History of hepatitis-C   7. Chronic right ankle wound/osteomyelitis  8.  Diabetes    9.  Acute kidney injury  10. Acute hypoxic respiratory failure  11. Pulmonary edema with pleural effusions/ Pneumonitis      -Continue ceftriaxone with end date of 08/09 and maintain chronic suppressive doxycycline, following inflammatory markers  -Afebrile without leukocytosis   -7/4 and and 7/8 blood cultures negative and sputum culture with moderate yeast.    -7/14 CXR results noted  -7/4 CT scan of the abdomen and pelvis and 7/3 chest x-ray results noted, likely dealing with pulmonary edema with pleural effusions and possibly superimposed infectious pneumonitis. We will get procalcitonin level for further evaluation  -6/28 right knee abscess culture with Group B Streptococcus  -Seen by Orthopedic surgery team s/p I&D of R prepatellar bursa abscess and septic R knee with cultures yielding Group G Streptococcus  -6/28 blood cultures negative  -Multiple comorbidities,  paraplegic with chronic pressure wounds, right ankle osteomyelitis with exposed bone on chronic suppressive doxycycline  -We will continue surgical site care per Orthopedic surgery team  -We will continue wound care  -He is to continue management of his hepatitis-C as outpatient  -Renal impairment noted, HD per Nephrology, started 7/4  -7/3 Renal ultrasound results noted  -Extubated and currently on 5L Oxymask  -Discussed with patient and nursing staff.

## 2023-07-20 NOTE — PLAN OF CARE
Problem: Adult Inpatient Plan of Care  Goal: Plan of Care Review  Outcome: Ongoing, Progressing     Problem: Adult Inpatient Plan of Care  Goal: Patient-Specific Goal (Individualized)  Outcome: Ongoing, Progressing     Problem: Adult Inpatient Plan of Care  Goal: Absence of Hospital-Acquired Illness or Injury  Outcome: Ongoing, Progressing     Problem: Adult Inpatient Plan of Care  Goal: Optimal Comfort and Wellbeing  Outcome: Ongoing, Progressing     Problem: Adult Inpatient Plan of Care  Goal: Readiness for Transition of Care  Outcome: Ongoing, Progressing     Problem: Skin Injury Risk Increased  Goal: Skin Health and Integrity  Outcome: Ongoing, Progressing     Problem: Impaired Wound Healing  Goal: Optimal Wound Healing  Outcome: Ongoing, Progressing

## 2023-07-21 LAB
ALBUMIN SERPL-MCNC: 3 G/DL (ref 3.5–5)
ALLENS TEST BLOOD GAS (OHS): YES
BASE EXCESS BLD CALC-SCNC: 8.1 MMOL/L (ref -2–2)
BASOPHILS # BLD AUTO: 0.05 X10(3)/MCL
BASOPHILS NFR BLD AUTO: 0.5 %
BIPAP(E) BLOOD GAS (OHS): 8 CM H2O
BIPAP(I) BLOOD GAS (OHS): 16 CM H2O
BLOOD GAS SAMPLE TYPE (OHS): ABNORMAL
BUN SERPL-MCNC: 61.2 MG/DL (ref 8.4–25.7)
CA-I BLD-SCNC: 1.2 MMOL/L (ref 1.12–1.23)
CALCIUM SERPL-MCNC: 9.5 MG/DL (ref 8.4–10.2)
CHLORIDE SERPL-SCNC: 107 MMOL/L (ref 98–107)
CO2 BLDA-SCNC: 33.3 MMOL/L
CO2 SERPL-SCNC: 26 MMOL/L (ref 22–29)
COHGB MFR BLDA: 0.9 % (ref 0.5–1.5)
CREAT SERPL-MCNC: 2.07 MG/DL (ref 0.73–1.18)
DRAWN BY BLOOD GAS (OHS): ABNORMAL
EOSINOPHIL # BLD AUTO: 0.02 X10(3)/MCL (ref 0–0.9)
EOSINOPHIL NFR BLD AUTO: 0.2 %
ERYTHROCYTE [DISTWIDTH] IN BLOOD BY AUTOMATED COUNT: 22 % (ref 11.5–17)
FIO2 BLOOD GAS (OHS): 60 %
GFR SERPLBLD CREATININE-BSD FMLA CKD-EPI: 36 MLS/MIN/1.73/M2
GLUCOSE SERPL-MCNC: 146 MG/DL (ref 74–100)
HCO3 BLDA-SCNC: 32 MMOL/L (ref 22–26)
HCT VFR BLD AUTO: 32.7 % (ref 42–52)
HGB BLD-MCNC: 10.5 G/DL (ref 14–18)
IMM GRANULOCYTES # BLD AUTO: 0.11 X10(3)/MCL (ref 0–0.04)
IMM GRANULOCYTES NFR BLD AUTO: 1 %
LYMPHOCYTES # BLD AUTO: 2.15 X10(3)/MCL (ref 0.6–4.6)
LYMPHOCYTES NFR BLD AUTO: 20.1 %
MCH RBC QN AUTO: 25.5 PG (ref 27–31)
MCHC RBC AUTO-ENTMCNC: 32.1 G/DL (ref 33–36)
MCV RBC AUTO: 79.6 FL (ref 80–94)
METHGB MFR BLDA: 0.9 % (ref 0.4–1.5)
MODE (OHS): ABNORMAL
MONOCYTES # BLD AUTO: 1.3 X10(3)/MCL (ref 0.1–1.3)
MONOCYTES NFR BLD AUTO: 12.2 %
NEUTROPHILS # BLD AUTO: 7.04 X10(3)/MCL (ref 2.1–9.2)
NEUTROPHILS NFR BLD AUTO: 66 %
NRBC BLD AUTO-RTO: 1.3 %
O2 HB BLOOD GAS (OHS): 96.2 % (ref 94–97)
OXYHGB MFR BLDA: 10.6 G/DL (ref 12–16)
PCO2 BLDA: 41 MMHG (ref 35–45)
PH BLDA: 7.5 [PH] (ref 7.35–7.45)
PHOSPHATE SERPL-MCNC: 2.7 MG/DL (ref 2.3–4.7)
PLATELET # BLD AUTO: 854 X10(3)/MCL (ref 130–400)
PMV BLD AUTO: 9.6 FL (ref 7.4–10.4)
PO2 BLDA: 95 MMHG (ref 80–100)
POTASSIUM BLOOD GAS (OHS): 3.4 MMOL/L (ref 3.5–5)
POTASSIUM SERPL-SCNC: 3.8 MMOL/L (ref 3.5–5.1)
RBC # BLD AUTO: 4.11 X10(6)/MCL (ref 4.7–6.1)
SAMPLE SITE BLOOD GAS (OHS): ABNORMAL
SAO2 % BLDA: 98 %
SODIUM BLOOD GAS (OHS): 146 MMOL/L (ref 137–145)
SODIUM SERPL-SCNC: 147 MMOL/L (ref 136–145)
WBC # SPEC AUTO: 10.67 X10(3)/MCL (ref 4.5–11.5)

## 2023-07-21 PROCEDURE — 25000003 PHARM REV CODE 250: Performed by: INTERNAL MEDICINE

## 2023-07-21 PROCEDURE — 36600 WITHDRAWAL OF ARTERIAL BLOOD: CPT

## 2023-07-21 PROCEDURE — 82803 BLOOD GASES ANY COMBINATION: CPT

## 2023-07-21 PROCEDURE — 99900026 HC AIRWAY MAINTENANCE (STAT)

## 2023-07-21 PROCEDURE — 80069 RENAL FUNCTION PANEL: CPT | Performed by: INTERNAL MEDICINE

## 2023-07-21 PROCEDURE — 97605 NEG PRS WND THER DME<=50SQCM: CPT

## 2023-07-21 PROCEDURE — 94668 MNPJ CHEST WALL SBSQ: CPT

## 2023-07-21 PROCEDURE — 25000003 PHARM REV CODE 250

## 2023-07-21 PROCEDURE — 63600175 PHARM REV CODE 636 W HCPCS: Performed by: INTERNAL MEDICINE

## 2023-07-21 PROCEDURE — 94660 CPAP INITIATION&MGMT: CPT | Mod: XB

## 2023-07-21 PROCEDURE — 25000242 PHARM REV CODE 250 ALT 637 W/ HCPCS: Performed by: INTERNAL MEDICINE

## 2023-07-21 PROCEDURE — 63600175 PHARM REV CODE 636 W HCPCS: Mod: JZ,JG

## 2023-07-21 PROCEDURE — C9248 INJ, CLEVIDIPINE BUTYRATE: HCPCS | Mod: JZ,JG

## 2023-07-21 PROCEDURE — 94002 VENT MGMT INPAT INIT DAY: CPT

## 2023-07-21 PROCEDURE — 63600175 PHARM REV CODE 636 W HCPCS

## 2023-07-21 PROCEDURE — 27202055 HC BRONCHOSCOPE, DISP

## 2023-07-21 PROCEDURE — 85025 COMPLETE CBC W/AUTO DIFF WBC: CPT

## 2023-07-21 PROCEDURE — 94640 AIRWAY INHALATION TREATMENT: CPT

## 2023-07-21 PROCEDURE — 20000000 HC ICU ROOM

## 2023-07-21 PROCEDURE — 94761 N-INVAS EAR/PLS OXIMETRY MLT: CPT

## 2023-07-21 PROCEDURE — 99900035 HC TECH TIME PER 15 MIN (STAT)

## 2023-07-21 PROCEDURE — 27000221 HC OXYGEN, UP TO 24 HOURS

## 2023-07-21 PROCEDURE — 27200966 HC CLOSED SUCTION SYSTEM

## 2023-07-21 PROCEDURE — 99900031 HC PATIENT EDUCATION (STAT)

## 2023-07-21 PROCEDURE — 99900025 HC BRONCHOSCOPY-ASST (STAT)

## 2023-07-21 PROCEDURE — 25000003 PHARM REV CODE 250: Performed by: STUDENT IN AN ORGANIZED HEALTH CARE EDUCATION/TRAINING PROGRAM

## 2023-07-21 RX ORDER — SODIUM CHLORIDE FOR INHALATION 3 %
4 VIAL, NEBULIZER (ML) INHALATION 2 TIMES DAILY
Status: DISCONTINUED | OUTPATIENT
Start: 2023-07-21 | End: 2023-08-05

## 2023-07-21 RX ORDER — POTASSIUM CHLORIDE 14.9 MG/ML
40 INJECTION INTRAVENOUS ONCE
Status: COMPLETED | OUTPATIENT
Start: 2023-07-21 | End: 2023-07-21

## 2023-07-21 RX ORDER — IPRATROPIUM BROMIDE AND ALBUTEROL SULFATE 2.5; .5 MG/3ML; MG/3ML
3 SOLUTION RESPIRATORY (INHALATION) 2 TIMES DAILY
Status: DISCONTINUED | OUTPATIENT
Start: 2023-07-21 | End: 2023-08-09 | Stop reason: HOSPADM

## 2023-07-21 RX ORDER — PROPOFOL 10 MG/ML
INJECTION, EMULSION INTRAVENOUS
Status: COMPLETED
Start: 2023-07-21 | End: 2023-07-21

## 2023-07-21 RX ORDER — FENTANYL CITRATE 50 UG/ML
INJECTION, SOLUTION INTRAMUSCULAR; INTRAVENOUS
Status: DISPENSED
Start: 2023-07-21 | End: 2023-07-22

## 2023-07-21 RX ORDER — FENTANYL CITRATE 50 UG/ML
INJECTION, SOLUTION INTRAMUSCULAR; INTRAVENOUS CODE/TRAUMA/SEDATION MEDICATION
Status: DISCONTINUED | OUTPATIENT
Start: 2023-07-21 | End: 2023-08-09 | Stop reason: HOSPADM

## 2023-07-21 RX ORDER — ETOMIDATE 2 MG/ML
INJECTION INTRAVENOUS CODE/TRAUMA/SEDATION MEDICATION
Status: DISCONTINUED | OUTPATIENT
Start: 2023-07-21 | End: 2023-08-09 | Stop reason: HOSPADM

## 2023-07-21 RX ORDER — PROPOFOL 10 MG/ML
0-50 INJECTION, EMULSION INTRAVENOUS CONTINUOUS
Status: DISCONTINUED | OUTPATIENT
Start: 2023-07-21 | End: 2023-08-09 | Stop reason: HOSPADM

## 2023-07-21 RX ORDER — ROCURONIUM BROMIDE 10 MG/ML
INJECTION, SOLUTION INTRAVENOUS CODE/TRAUMA/SEDATION MEDICATION
Status: DISCONTINUED | OUTPATIENT
Start: 2023-07-21 | End: 2023-08-09 | Stop reason: HOSPADM

## 2023-07-21 RX ADMIN — ATORVASTATIN CALCIUM 20 MG: 10 TABLET, FILM COATED ORAL at 08:07

## 2023-07-21 RX ADMIN — CARVEDILOL 25 MG: 12.5 TABLET, FILM COATED ORAL at 08:07

## 2023-07-21 RX ADMIN — CLEVIPIDINE 8 MG/HR: 0.5 EMULSION INTRAVENOUS at 03:07

## 2023-07-21 RX ADMIN — ALBUTEROL SULFATE 2.5 MG: 2.5 SOLUTION RESPIRATORY (INHALATION) at 12:07

## 2023-07-21 RX ADMIN — POTASSIUM CHLORIDE 40 MEQ: 14.9 INJECTION, SOLUTION INTRAVENOUS at 03:07

## 2023-07-21 RX ADMIN — MICONAZOLE NITRATE 2 % TOPICAL POWDER: at 08:07

## 2023-07-21 RX ADMIN — SODIUM CHLORIDE 30 MG/ML INHALATION SOLUTION 4 ML: 30 SOLUTION INHALANT at 07:07

## 2023-07-21 RX ADMIN — GUAIFENESIN 400 MG: 200 SOLUTION ORAL at 06:07

## 2023-07-21 RX ADMIN — DOXYCYCLINE HYCLATE 100 MG: 100 TABLET, COATED ORAL at 08:07

## 2023-07-21 RX ADMIN — PROPOFOL 25 MCG/KG/MIN: 10 INJECTION, EMULSION INTRAVENOUS at 06:07

## 2023-07-21 RX ADMIN — LABETALOL HYDROCHLORIDE 10 MG: 5 INJECTION, SOLUTION INTRAVENOUS at 04:07

## 2023-07-21 RX ADMIN — OXYBUTYNIN CHLORIDE 5 MG: 5 TABLET ORAL at 08:07

## 2023-07-21 RX ADMIN — FAMOTIDINE 20 MG: 10 INJECTION, SOLUTION INTRAVENOUS at 08:07

## 2023-07-21 RX ADMIN — FENTANYL CITRATE 100 MCG: 50 INJECTION, SOLUTION INTRAMUSCULAR; INTRAVENOUS at 05:07

## 2023-07-21 RX ADMIN — IPRATROPIUM BROMIDE AND ALBUTEROL SULFATE 3 ML: 2.5; .5 SOLUTION RESPIRATORY (INHALATION) at 08:07

## 2023-07-21 RX ADMIN — FENTANYL CITRATE 100 MCG: 50 INJECTION, SOLUTION INTRAMUSCULAR; INTRAVENOUS at 04:07

## 2023-07-21 RX ADMIN — ENOXAPARIN SODIUM 30 MG: 30 INJECTION SUBCUTANEOUS at 08:07

## 2023-07-21 RX ADMIN — ETOMIDATE 30 MG: 2 INJECTION INTRAVENOUS at 04:07

## 2023-07-21 RX ADMIN — ROCURONIUM BROMIDE 100 MG: 10 INJECTION, SOLUTION INTRAVENOUS at 04:07

## 2023-07-21 RX ADMIN — ALBUTEROL SULFATE 2.5 MG: 2.5 SOLUTION RESPIRATORY (INHALATION) at 04:07

## 2023-07-21 RX ADMIN — ALBUTEROL SULFATE 2.5 MG: 2.5 SOLUTION RESPIRATORY (INHALATION) at 08:07

## 2023-07-21 RX ADMIN — GUAIFENESIN 400 MG: 200 SOLUTION ORAL at 09:07

## 2023-07-21 RX ADMIN — IPRATROPIUM BROMIDE 0.5 MG: 0.5 SOLUTION RESPIRATORY (INHALATION) at 12:07

## 2023-07-21 RX ADMIN — CLEVIPIDINE 8 MG/HR: 0.5 EMULSION INTRAVENOUS at 02:07

## 2023-07-21 RX ADMIN — HYDRALAZINE HYDROCHLORIDE 10 MG: 20 INJECTION INTRAMUSCULAR; INTRAVENOUS at 06:07

## 2023-07-21 RX ADMIN — PROPOFOL 40 MCG/KG/MIN: 10 INJECTION, EMULSION INTRAVENOUS at 08:07

## 2023-07-21 RX ADMIN — IPRATROPIUM BROMIDE 0.5 MG: 0.5 SOLUTION RESPIRATORY (INHALATION) at 08:07

## 2023-07-21 RX ADMIN — CLEVIPIDINE 8 MG/HR: 0.5 EMULSION INTRAVENOUS at 08:07

## 2023-07-21 NOTE — PROGRESS NOTES
Progress Note  Nephrology    Admit Date: 6/28/2023   LOS: 23 days     SUBJECTIVE:     Follow-up For:  ANTON / ATN    Interval History:  57 Y/O male with history of paraplegia and Suprapubic cath with frequent infection , admitted to hospital due to Right knee infection but he had respiratory failure , got intubated and placed on vent   And had a few dialysis with improvement of urine out put and BUN and CR  Currently off of vent and extubated but on     Review of Systems:  Constitutional: No fever or chills , on O2 supplement   Respiratory: No cough or shortness of breath  Cardiovascular: No chest pain or palpitations  Gastrointestinal: No nausea or vomiting  Neurological: No confusion or weakness    OBJECTIVE:     Vital Signs Range (Last 24H):  Vitals:    07/21/23 1212   BP:    Pulse: 91   Resp: 18   Temp:        Temp:  [97 °F (36.1 °C)-99.1 °F (37.3 °C)]   Pulse:  []   Resp:  [14-42]   BP: (134-177)/()   SpO2:  [87 %-100 %]     I & O (Last 24H):  Intake/Output Summary (Last 24 hours) at 7/21/2023 1501  Last data filed at 7/21/2023 0600  Gross per 24 hour   Intake 1612.1 ml   Output 1710 ml   Net -97.9 ml       Physical Exam:  Alert , oriented , in NAD  Head   normal   Neck   supple   Chest   symmetric   Lungs   clear   Heart   RRR  Abdomen   soft , non tender   Ext   no edema   Urine out put 2560 cc last 24 hrs     Laboratory Data:    Recent Labs   Lab 07/21/23  0140   *   K 3.8   CO2 26   BUN 61.2*   CREATININE 2.07*   GLUCOSE 146*   CALCIUM 9.5   PHOS 2.7     Lab Results   Component Value Date    .3 (H) 07/04/2023    CALCIUM 9.5 07/21/2023    CAION 1.20 07/21/2023    PHOS 2.7 07/21/2023     Lab Results   Component Value Date    IRON 35 (L) 12/30/2021    TIBC 284 12/30/2021    FERRITIN 24.99 12/30/2021       Medications:  Medication list was reviewed and changes noted under Assessment/Plan.    Diagnostic Results:    Reviewed most recent CT/US/Echo/MRI    ASSESSMENT/PLAN:   1   ANTON / ATN  2    CKD4  3   SHPT  4   hypernatremia  5   paraplegia  6   HTN  7   A Fib  8   DM 2  9   Right knee infection   10  respiratory failure   on O 2 supplement     Plan   DC temporary dialysis cath             Will DC lisinopril for now due to ANTON             Increase D5W to 125 cc per hr             Labs in AM

## 2023-07-21 NOTE — PT/OT/SLP PROGRESS
Pt has been weaned to an oxymask receiving 5L of O2. While he is demonstrating more appropriate saturation levels, pt is somnolent and confused. He is unable to follow commands and only express unintelligible verbal responses. He is not appropriate for PO intake at this time given his current behavioral and cognitive state. A NG tube has been replaced. SLP recommending to continue with NPO status and NG feedings as indicated by MD. Will continue to follow and complete a MBS when appropriate. MD notified and in agreement with current recommendations.

## 2023-07-21 NOTE — NURSING
07/21/23 1000        NG/OG Tube 07/21/23 1000 [facility] sump 16 Fr. Left nostril   Placement Date/Time: 07/21/23 1000   Tube Type: [facility] sump  Tube Size (Fr.): 16 Fr.  Tube Location: Left nostril   Placement Check placement verified by aspirate characteristics;placement verified by x-ray;placement verified by distal tube length measurement   Distal Tube Length (cm) 60   Tolerance no signs/symptoms of discomfort   Securement secured to nostril center   Clamp Status/Tolerance clamped   Insertion Site Appearance no redness, warmth, tenderness, skin breakdown, drainage        Incision/Site 06/29/23 1103 Right Leg   Date First Assessed/Time First Assessed: 06/29/23 1103   Side: Right  Location: Leg   Wound Image   (knee right)   Dressing Appearance Intact;Moist drainage   Drainage Amount Small   Drainage Characteristics/Odor No odor   Appearance Pink;Moist;Granulating   Red (%), Wound Tissue Color 100 %   Periwound Area Dry;Macerated   Wound Length (cm) 2 cm   Wound Width (cm) 0.8 cm   Wound Depth (cm) 0.4 cm   Wound Volume (cm^3) 0.64 cm^3   Wound Surface Area (cm^2) 1.6 cm^2   Tunneling (depth (cm)/location) 12 noon 4cm   Care Cleansed with:;Sterile normal saline   Dressing Changed        Negative Pressure Wound Therapy  06/29/23 1120 Right anterior   Placement Date/Time: 06/29/23 1120   Side: Right  Orientation: anterior  Location: Leg   NPWT Type Vacuum Therapy   Therapy Setting NPWT Continuous therapy   Pressure Setting NPWT 125 mmHg   Sponges Inserted NPWT Black;White;1   Sponges Removed NPWT Black;White;1     WOCN follow-up change out of wd vac to right knee.   Pt remains intubated.   Remains somewhat sedated.   He tolerated  vac change without noted pain.   T he wound itself is slow progressing but forward.    Will continue bi-weekly changes.

## 2023-07-21 NOTE — PROCEDURES
"Bianca Khan is a 58 y.o. male patient.    Temp: 99 °F (37.2 °C) (23 1600)  Pulse: 104 (23 1629)  Resp: (!) 26 (23 1629)  BP: (!) 151/93 (23 1625)  SpO2: 100 % (23 162)  Weight: 86.2 kg (190 lb) (23 1114)  Height: 5' 9.69" (177 cm) (23 1249)       Intubation    Date/Time: 2023 5:11 PM  Location procedure was performed: [facility]  Performed by: Luis Mcclure MD  Authorized by: Luis Mcclure MD   Pre-operative diagnosis: Acute hypoxemic respiratory failure, mucus plugging  Post-operative diagnosis: Same  Consent Done: Yes  Consent: Verbal consent obtained. Written consent not obtained.  Risks and benefits: risks, benefits and alternatives were discussed (Consent obtained per the patient's wife, Katrina)  Consent given by: spouse  Patient understanding: patient does not state understanding of the procedure being performed  Patient consent: the patient's understanding of the procedure does not match consent given  Procedure consent: procedure consent matches procedure scheduled  Relevant documents: relevant documents present and verified  Test results: test results available and properly labeled  Site marked: the operative site was not marked  Imaging studies: imaging studies not available  Patient identity confirmed: , MRN, name and provided demographic data (Patient is altered and unable to confirm all information)  Time out: Immediately prior to procedure a "time out" was called to verify the correct patient, procedure, equipment, support staff and site/side marked as required.  Indications: respiratory distress, respiratory failure, airway protection, hypoxemia and pulmonary toilet  Intubation method: fiberoptic oral (Initial attempt with direct laryngoscopy with MAC 3 blade with grade 4 view of the airway)  Patient status: paralyzed (RSI)  Preoxygenation: bag valve mask  Pretreatment medications: fentanyl (200 microgram)  Sedatives: " etomidate (30 milligrams)  Paralytic: rocuronium (100 milligrams)  Laryngoscope size: Mac 3  Tube size: 8.0 mm  Tube type: cuffed  Number of attempts: 2  Ventilation between attempts: BVM  Cricoid pressure: no  Cords visualized: yes (Grade 4 view with initial direct laryngoscopy, grade 1 view with video laryngoscopy on 2nd attempt)  Post-procedure assessment: chest rise and CO2 detector (Tube fog, direct visualization)  Breath sounds: diminished and reduced on left  Cuff inflated: yes  ETT to lip: 23 cm  ETT to teeth: 22 cm  Tube secured with: ETT herring  Patient tolerance: Patient tolerated the procedure well with no immediate complications  Complications: No  Specimens: No  Implants: No  Comments: Position of endotracheal tube above the marija visualize with bronchoscopy, chest x-ray pending        7/21/2023

## 2023-07-21 NOTE — PROGRESS NOTES
[facility] - [facility] ICU  Pulmonary Critical Care Note    Patient Name: Bianca Khan  MRN: [id number]  Admission Date: 6/28/2023  Hospital Length of Stay: 23 days  Code Status: Full Code  Attending Provider: Khris Treadwell Jr., MD, *  Primary Care Provider: Areli Vargas PA-C     Subjective:     HPI:   Bianca Khan is a 58 y.o. male with PMH of paraplegia 2/2 spinal cord injury (T1-T6), neurogenic bladder with suprapubic catheter, chronic right ankle osteomyelitis, DM II, HTN, who presented to the ED on 6/28/2023 with CC of right knee pain. Per chart review, CT of right knee revealed 5 cm area of subcutaneous fluid in prepatellar region which was aspirated in the ED; he was taken to the OR on 6/29/2023 by orthopedic surgery team and was found to have prepatellar bursa infection and septic arthritis. Wound culture 6/29/2023 positive for strep agalactiae. Orthopedic surgery team recommended right-sided above-knee amputation d/t chronically infected hardware in right lower extremity. On 7/4/2023, a RRT was called d/t acute respiratory distress that was not improving despite being placed on vapotherm at 35 L/min with FiO2 100%. At the time of examination, patient's initial GCS was 10 but progressively reduced to a GCS of 6. Shared decision with patient's wife was made to intubate patient for airway protection though ABGs were within normal limits. He was admitted to the ICU for close monitoring and further medical management.    Hospital Course/Significant events:  6/29/2023 - I&D of right knee  7/4/2023 - Admitted to ICU, intubated for airway protection d/t altered mental status. Trialysis catheter placed and HD began  7/18/2023 - Extubated    24 Hour Interval History:  Remains persistently hypertensive on Cleviprex drip, currently at 8 mg/hr.  Required 60% FiO2 since beginning night shift, however, able to down titrate to 40%. Still reports shortness of breath, and has no other complaints.      MBSS not performed yesterday due to increasing oxygen requirements. If he can be weaned down today, can likely reattempt MBSS. Otherwise, will need to remain on cleviprex drip with clonidine patch for the time being.    Past Medical History:   Diagnosis Date    Arthritis     Chronic ulcer of ankle 05/26/2022    Frequent UTI 07/02/2019    Generalized anxiety disorder 05/26/2022    Neurogenic bladder 05/26/2022    Osteomyelitis 05/26/2022    Presence of suprapubic catheter 05/26/2022    Pure hypercholesterolemia 05/26/2022    Retention of urine, unspecified 08/09/2019    Spinal cord injury at T1-T6 level 04/20/2018       Past Surgical History:   Procedure Laterality Date    INCISION AND DRAINAGE, LOWER EXTREMITY Right 6/29/2023    Procedure: INCISION AND DRAINAGE, LOWER EXTREMITY;  Surgeon: Prabhu Shen DO;  Location: [facility];  Service: Orthopedics;  Laterality: Right;  supine bone foam wash stuff cultures    INSERTION OF INTRAMEDULLARY MARISA Right 8/10/2022    Procedure: INSERTION, INTRAMEDULLARY MARISA RIGHT TIBIA;  Surgeon: Jorge Orellana MD;  Location: [facility];  Service: Orthopedics;  Laterality: Right;       Social History     Socioeconomic History    Marital status:    Tobacco Use    Smoking status: Every Day     Types: Cigars, Cigarettes    Smokeless tobacco: Never   Substance and Sexual Activity    Alcohol use: Yes     Alcohol/week: 2.0 standard drinks     Types: 2 Cans of beer per week    Drug use: Not Currently    Sexual activity: Never       Current Outpatient Medications   Medication Instructions    amitriptyline (ELAVIL) 50 mg, Oral, Nightly    amLODIPine (NORVASC) 10 MG tablet 1 tablet    atorvastatin (LIPITOR) 20 mg, Oral, Nightly    busPIRone (BUSPAR) 5 MG Tab 1 tablet    carvediloL (COREG) 12.5 mg, Oral    cyclobenzaprine (FLEXERIL) 10 mg, Oral, 2 times daily PRN    doxycycline (VIBRAMYCIN) 100 mg, Oral, Daily    fenofibrate 160 mg, Oral    FLUoxetine 20 mg, Oral    gabapentin (NEURONTIN) 300  MG capsule 1 capsule    lisinopriL (PRINIVIL,ZESTRIL) 10 mg, Oral, Nightly    lisinopriL 10 mg, Oral, Daily    LORazepam (ATIVAN) 1 mg, Oral, 2 times daily    melatonin (MELATIN) 3 mg tablet TAKE TWO TABLETS BY MOUTH EVERY NIGHT AT BEDTIME AS NEEDED FOR INSOMNIA.    meloxicam (MOBIC) 15 mg, Oral, Daily    metFORMIN (GLUCOPHAGE) 500 MG tablet SMARTSI Tablet(s) By Mouth Morning-Evening    oxybutynin (DITROPAN) 5 mg, Oral, 2 times daily    oxyCODONE-acetaminophen (PERCOCET)  mg per tablet 1 tablet, Oral, Every 6 hours PRN    pantoprazole (PROTONIX) 40 MG tablet 1 tablet    temazepam (RESTORIL) 30 mg, Oral, Nightly    traZODone (DESYREL) 50 mg, Oral, Nightly    XARELTO 20 mg Tab SMARTSI Tablet(s) By Mouth Every Evening     Current Inpatient Medications   albuterol sulfate  2.5 mg Nebulization Q6H    And    ipratropium  0.5 mg Nebulization Q6H    amLODIPine  10 mg Oral Daily    atorvastatin  20 mg Per NG tube Nightly    carvediloL  25 mg Per NG tube BID    cefTRIAXone (ROCEPHIN) IVPB  2 g Intravenous Q24H    cloNIDine 0.2 mg/24 hr td ptwk  1 patch Transdermal Q7 Days    doxycycline  100 mg Per NG tube Q12H    enoxaparin  30 mg Subcutaneous Q12H    famotidine (PF)  20 mg Intravenous Daily    fenofibrate  48 mg Per NG tube Daily    guaiFENesin 100 mg/5 ml  400 mg Per NG tube Q4H    lisinopriL  10 mg Oral Daily    miconazole NITRATE 2 %   Topical (Top) BID    oxybutynin  5 mg Per NG tube BID    senna-docusate 8.6-50 mg  2 tablet Per NG tube BID    sodium citrate-citric acid 500-334 mg/5 ml  30 mL Oral Once     Current Intravenous Infusions   clevidipine 8 mg/hr (23 0354)    dexmedeTOMIDine (Precedex) infusion (titrating) 0.2 mcg/kg/hr (23)    dextrose 5 % (D5W) 100 mL/hr at 23 5039    NORepinephrine bitartrate-D5W Stopped (23 1039)    propofoL Stopped (23 1240)     Objective:     Intake/Output Summary (Last 24 hours) at 2023 0407  Last data filed at 2023 0400  Gross  per 24 hour   Intake 939.8 ml   Output 3335 ml   Net -2395.2 ml       Vital Signs (Most Recent):  Temp: 99.1 °F (37.3 °C) (07/21/23 0400)  Pulse: 87 (07/21/23 0400)  Resp: 17 (07/21/23 0400)  BP: (!) 163/90 (07/21/23 0400)  SpO2: 97 % (07/21/23 0400)  Body mass index is 27.51 kg/m².  Weight: 86.2 kg (190 lb) Vital Signs (24h Range):  Temp:  [97 °F (36.1 °C)-99.1 °F (37.3 °C)] 99.1 °F (37.3 °C)  Pulse:  [] 87  Resp:  [14-42] 17  SpO2:  [83 %-100 %] 97 %  BP: (115-177)/() 163/90     Physical Exam  Vitals and nursing note reviewed.   Constitutional:       Interventions: He is sedated.   HENT:      Head: Normocephalic and atraumatic.      Nose: Nose normal.   Cardiovascular:      Rate and Rhythm: Normal rate and regular rhythm.      Pulses: Normal pulses.      Heart sounds: Normal heart sounds.   Pulmonary:      Effort: No respiratory distress.      Breath sounds: Rhonchi present.      Comments: Coarse rhonchi throughout lung fields. BiPAP 16/8 @ 60% FiO2  Abdominal:      General: Bowel sounds are normal.      Palpations: Abdomen is soft.   Musculoskeletal:      Cervical back: Neck supple.      Comments: Wound VAC in place to the right knee draining bloody fluid with a total of 300 mL   Skin:     General: Skin is warm and dry.      Capillary Refill: Capillary refill takes less than 2 seconds.   Neurological:      Mental Status: He is alert.      Comments: Speech slow, able to understand speech with some effort.  No slurring. Chronic bilateral lower extremity paralysis.    Psychiatric:         Mood and Affect: Mood and affect normal.         Behavior: Behavior is cooperative.      Comments:        Lines/Drains/Airways       Central Venous Catheter Line  Duration                  Hemodialysis Catheter 07/14/23 1329 left internal jugular 6 days              Drain  Duration                  Suprapubic Catheter 07/06/23 1544 100% silicone 22 Fr. 14 days              Peripheral Intravenous Line  Duration                   Midline Catheter Insertion/Assessment  - Single Lumen 07/02/23 2128 Right basilic vein (medial side of arm) 18 days         Peripheral IV - Single Lumen 07/04/23 0600 18 G Anterior;Left Forearm 16 days         Peripheral IV - Single Lumen 07/04/23 0600 20 G Left;Posterior Hand 16 days                  Significant Labs:  Lab Results   Component Value Date    WBC 10.67 07/21/2023    HGB 10.5 (L) 07/21/2023    HCT 32.7 (L) 07/21/2023    MCV 79.6 (L) 07/21/2023     (H) 07/21/2023       BMP  Lab Results   Component Value Date     (H) 07/21/2023    K 3.8 07/21/2023    CHLORIDE 107 07/21/2023    CO2 26 07/21/2023    BUN 61.2 (H) 07/21/2023    CREATININE 2.07 (H) 07/21/2023    CALCIUM 9.5 07/21/2023    AGAP 9.0 08/29/2022    ESTGFRAFRICA 101 05/11/2022    EGFRNONAA >60 04/27/2022     ABG  Recent Labs   Lab 07/21/23  0054   PH 7.500*   PO2 95.0   HCO3 32.0*       Mechanical Ventilation Support:  Vent Mode: SIMV (07/18/23 1031)  Ventilator Initiated: Yes (07/11/23 1420)  Set Rate: 6 BPM (07/18/23 1031)  Vt Set: 450 mL (07/18/23 1031)  Pressure Support: 12 cmH20 (07/18/23 1031)  PEEP/CPAP: 8 cmH20 (07/18/23 1031)  Oxygen Concentration (%): 30 (07/21/23 0400)  Peak Airway Pressure: 21 cmH20 (07/18/23 1031)  Total Ve: 8.4 L/m (07/18/23 1031)  F/VT Ratio<105 (RSBI): (!) 76.67 (07/18/23 1031)    Significant Imaging:  I have reviewed the pertinent imaging within the past 24 hours.    Assessment/Plan:     Assessment  Extensive bilateral pulmonary infiltrates  DX: Infectious pneumonia versus ARDS versus hydrostatic/non hydrostatic pulmonary edema   Sputum culture showed moderate yeast  Intubated 07/04/2023  Extubated 07/18/2023  Prepatellar bursitis/septic arthritis of the right knee (strep agalactiae)   Post I&D on 06/29/2023   Ortho no longer planning for right above-the-knee amputation in near future due to infectious suppression  Acute renal failure, oliguric  Initiation of hemodialysis on 07/04/2023  Acute  encephalopathy, likely metabolic related to above, currently clouded by sedation   Reported paroxysmal atrial fibrillation with permenant pacemaker, currently in sinus rhythm   H/o chronic paraplegia at T1 2/2 spinal cordy injury, neurogenic bladder, chronic right ankle osteomyelitis, DM2, HTN    Plan  -Continue ICU level of care for ongoing monitoring and medical management  -ID, urology, orthopedics, wound care teams following, appreciate their assistance   -HD/Ultrafiltration as per nephrology  -per ID Merrem discontinued and switched to ceftriaxone with end date of 08/09 and continuing suppressive doxycycline with further plan of 6 week course for GBS septic arthritis and following inflammatory markers; repeat cultures (7/9/23) NGTD.  -Will trial methylnaltrexone for constipation, failed lactulose, only small amount of output over course of past few days with belly distension  - Bronchoscopy revealed clot burden in the left lobe which was removed; oxygenation has improved.  Will limit suctioning  - Wean supplemental O2 as tolerated, currently on BiPAP 16/8 40% FiO2. CPT q4   - No NG tube present, clonidine patch placed. Once weaned off of BiPAP, will attempt to get MBSS performed to restart home oral antihypertensives.    DVT Prophylaxis: Lovenox 30   GI Prophylaxis: Pepcid 20    Woody Azul DO PGY-II  Pulmonary Critical Care Medicine  [facility] - [facility] ICU

## 2023-07-21 NOTE — PROCEDURES
[facility]  Bronchoscopy Procedure Note    SUMMARY     Date of Procedure: 7/21/2023    Procedure: Bronchoscopy, Therapeutic  Bronchoalveolar lavage, BAL    Performed by: Lius Mcclure MD    Pre-Procedure Diagnosis:  Mucous plugging with acute hypoxemic respiratory failure    Post-Procedure Diagnosis:  Mucous plugging with complete atelectasis of left lung and partial occlusion of the right mainstem bronchus    Anesthesia: General Anesthesia        Description of the Findings of the Procedure:     Patient was consented via his wife, Katrina over the phone, for the procedure with all risk and benefit of the procedure explained in detail.  The bronchocope was inserted into the main airway via the endotracheal tube. An anatomical survey was done of the main airways and the subsegmental bronchus.  The distal trachea was visualized as partially covered in thick whitish mucus secretions which was easily aspirated revealing a sharp marija without any signs of mucosal abnormalities or obstructions.  Right mainstem bronchus was partially occluded with the same thick secretions which was vigorously suctioned out of the right mainstem bronchus, complete survey of the right lung was performed including right upper lobe right middle lobe bronchus intermedius and right lower lobe with suctioning of scant secretions in the subsegments of all the lobes of the right lung with complete clearance after suctioning was performed.  Visualization of all the subsegments of the right lung including right upper lobe right middle lobe bronchus intermedius and right lower lobe demonstrated widely patent airways with no mucosal abnormalities or obstructions.  There was some scant loose fresh bloody secretions visualized in the right middle lobe with no clear significance or active bleed visualized.  The left mainstem bronchus was visualized to be completely occluded with the same thick secretions which required significant  amounts of vigorous suctioning to remove mucus plugging.  Suctioning was performed at all aspects of the left upper lobe lingula and left lower lobe to remove the same occluding mucus from all the subsegments.  Diverticula were visualized at the medial aspect of the left mainstem bronchus takeoff.  Complete visualization of the left lung including left upper lobe lingula and left lower lobe as well as all the subsegments demonstrated widely patent airways without any signs of mucosal abnormalities or obstructions after vigorous suctioning was performed.  The bronchoscope was then withdrawn from the airway after confirming there was no active bleeding or further mucus plugging requiring aspiration.  The patient tolerated the procedure well.      The patient will remain intubated on mechanical ventilation at this time given the complexity and degree of mucus plugging visualized.  I described the need for prolonged intubation and probable tracheostomy to the patient's wife, Katrina, over the phone and she understood and gave full approval for intubation mechanical ventilation any other necessary procedures.  Consent confirmed by nurse, Mary.       Complications: None; patient tolerated the procedure well.    Estimated Blood Loss (EBL): Minimal           Specimens: None       Condition: Stable        Disposition: ICU - intubated and hemodynamically stable.    Luis Mcclure MD  [facility]

## 2023-07-21 NOTE — NURSING
Nurses Note -- 4 Eyes      7/21/2023   4:09 AM      Skin assessed during: Q Shift Change      [] No Altered Skin Integrity Present    [x]Prevention Measures Documented      [x] Yes- Altered Skin Integrity Present or Discovered   [] LDA Added if Not in Epic (Describe Wound)   [] New Altered Skin Integrity was Present on Admit and Documented in LDA   [] Wound Image Taken    Wound Care Consulted? Yes    Attending Nurse:  Amauri Angeles RN     Second RN/Staff Member:  Anai Mullen

## 2023-07-22 LAB
ALBUMIN SERPL-MCNC: 2.8 G/DL (ref 3.5–5)
ALBUMIN/GLOB SERPL: 0.5 RATIO (ref 1.1–2)
ALLENS TEST BLOOD GAS (OHS): YES
ALP SERPL-CCNC: 77 UNIT/L (ref 40–150)
ALT SERPL-CCNC: 16 UNIT/L (ref 0–55)
AST SERPL-CCNC: 28 UNIT/L (ref 5–34)
BASE EXCESS BLD CALC-SCNC: 4.6 MMOL/L (ref -2–2)
BASOPHILS # BLD AUTO: 0.05 X10(3)/MCL
BASOPHILS NFR BLD AUTO: 0.5 %
BILIRUBIN DIRECT+TOT PNL SERPL-MCNC: 0.5 MG/DL
BLOOD GAS SAMPLE TYPE (OHS): ABNORMAL
BNP BLD-MCNC: 255.3 PG/ML
BUN SERPL-MCNC: 60.5 MG/DL (ref 8.4–25.7)
CA-I BLD-SCNC: 1.16 MMOL/L (ref 1.12–1.23)
CALCIUM SERPL-MCNC: 9.1 MG/DL (ref 8.4–10.2)
CHLORIDE SERPL-SCNC: 105 MMOL/L (ref 98–107)
CLOSTRIDIUM DIFFICILE GDH ANTIGEN (OHS): NEGATIVE
CLOSTRIDIUM DIFFICILE TOXIN A/B (OHS): NEGATIVE
CO2 SERPL-SCNC: 25 MMOL/L (ref 22–29)
CREAT SERPL-MCNC: 2.62 MG/DL (ref 0.73–1.18)
DRAWN BY BLOOD GAS (OHS): ABNORMAL
EOSINOPHIL # BLD AUTO: 0.03 X10(3)/MCL (ref 0–0.9)
EOSINOPHIL NFR BLD AUTO: 0.3 %
ERYTHROCYTE [DISTWIDTH] IN BLOOD BY AUTOMATED COUNT: 22.1 % (ref 11.5–17)
FIO2 BLOOD GAS (OHS): 40 %
GFR SERPLBLD CREATININE-BSD FMLA CKD-EPI: 27 MLS/MIN/1.73/M2
GLOBULIN SER-MCNC: 5.1 GM/DL (ref 2.4–3.5)
GLUCOSE SERPL-MCNC: 119 MG/DL (ref 74–100)
HCO3 BLDA-SCNC: 28.3 MMOL/L (ref 22–26)
HCT VFR BLD AUTO: 34 % (ref 42–52)
HGB BLD-MCNC: 10.6 G/DL (ref 14–18)
IMM GRANULOCYTES # BLD AUTO: 0.08 X10(3)/MCL (ref 0–0.04)
IMM GRANULOCYTES NFR BLD AUTO: 0.7 %
LYMPHOCYTES # BLD AUTO: 3.34 X10(3)/MCL (ref 0.6–4.6)
LYMPHOCYTES NFR BLD AUTO: 30.6 %
MCH RBC QN AUTO: 24.7 PG (ref 27–31)
MCHC RBC AUTO-ENTMCNC: 31.2 G/DL (ref 33–36)
MCV RBC AUTO: 79.3 FL (ref 80–94)
MECH RR (OHS): 22 B/MIN
MECH VT (OHS): 460 ML
MODE (OHS): AC
MONOCYTES # BLD AUTO: 1.41 X10(3)/MCL (ref 0.1–1.3)
MONOCYTES NFR BLD AUTO: 12.9 %
NEUTROPHILS # BLD AUTO: 6.02 X10(3)/MCL (ref 2.1–9.2)
NEUTROPHILS NFR BLD AUTO: 55 %
NRBC BLD AUTO-RTO: 1.6 %
OXYGEN DEVICE BLOOD GAS (OHS): ABNORMAL
PCO2 BLDA: 38 MMHG (ref 35–45)
PEEP (OHS): 10 CMH2O
PH BLDA: 7.48 [PH] (ref 7.35–7.45)
PLATELET # BLD AUTO: 693 X10(3)/MCL (ref 130–400)
PMV BLD AUTO: 9.1 FL (ref 7.4–10.4)
PO2 BLDA: 78 MMHG (ref 80–100)
POTASSIUM BLOOD GAS (OHS): 2.8 MMOL/L (ref 3.5–5)
POTASSIUM SERPL-SCNC: 4.1 MMOL/L (ref 3.5–5.1)
PROT SERPL-MCNC: 7.9 GM/DL (ref 6.4–8.3)
RBC # BLD AUTO: 4.29 X10(6)/MCL (ref 4.7–6.1)
SAMPLE SITE BLOOD GAS (OHS): ABNORMAL
SODIUM BLOOD GAS (OHS): 140 MMOL/L (ref 137–145)
SODIUM SERPL-SCNC: 143 MMOL/L (ref 136–145)
WBC # SPEC AUTO: 10.93 X10(3)/MCL (ref 4.5–11.5)

## 2023-07-22 PROCEDURE — 25000003 PHARM REV CODE 250: Performed by: STUDENT IN AN ORGANIZED HEALTH CARE EDUCATION/TRAINING PROGRAM

## 2023-07-22 PROCEDURE — 25000003 PHARM REV CODE 250: Performed by: INTERNAL MEDICINE

## 2023-07-22 PROCEDURE — 94640 AIRWAY INHALATION TREATMENT: CPT

## 2023-07-22 PROCEDURE — 63600175 PHARM REV CODE 636 W HCPCS: Performed by: INTERNAL MEDICINE

## 2023-07-22 PROCEDURE — 36600 WITHDRAWAL OF ARTERIAL BLOOD: CPT

## 2023-07-22 PROCEDURE — 25000242 PHARM REV CODE 250 ALT 637 W/ HCPCS: Performed by: INTERNAL MEDICINE

## 2023-07-22 PROCEDURE — 94761 N-INVAS EAR/PLS OXIMETRY MLT: CPT

## 2023-07-22 PROCEDURE — 36410 VNPNXR 3YR/> PHY/QHP DX/THER: CPT

## 2023-07-22 PROCEDURE — 25000003 PHARM REV CODE 250

## 2023-07-22 PROCEDURE — 63600175 PHARM REV CODE 636 W HCPCS

## 2023-07-22 PROCEDURE — 99900031 HC PATIENT EDUCATION (STAT)

## 2023-07-22 PROCEDURE — 83880 ASSAY OF NATRIURETIC PEPTIDE: CPT | Performed by: INTERNAL MEDICINE

## 2023-07-22 PROCEDURE — 99900035 HC TECH TIME PER 15 MIN (STAT)

## 2023-07-22 PROCEDURE — 20000000 HC ICU ROOM

## 2023-07-22 PROCEDURE — 85025 COMPLETE CBC W/AUTO DIFF WBC: CPT

## 2023-07-22 PROCEDURE — 86318 IA INFECTIOUS AGENT ANTIBODY: CPT | Performed by: INTERNAL MEDICINE

## 2023-07-22 PROCEDURE — 80053 COMPREHEN METABOLIC PANEL: CPT | Performed by: INTERNAL MEDICINE

## 2023-07-22 PROCEDURE — 94003 VENT MGMT INPAT SUBQ DAY: CPT

## 2023-07-22 PROCEDURE — 27000221 HC OXYGEN, UP TO 24 HOURS

## 2023-07-22 PROCEDURE — 27200966 HC CLOSED SUCTION SYSTEM

## 2023-07-22 PROCEDURE — 99900026 HC AIRWAY MAINTENANCE (STAT)

## 2023-07-22 PROCEDURE — C1751 CATH, INF, PER/CENT/MIDLINE: HCPCS

## 2023-07-22 RX ORDER — FUROSEMIDE 10 MG/ML
20 INJECTION INTRAMUSCULAR; INTRAVENOUS DAILY
Status: DISCONTINUED | OUTPATIENT
Start: 2023-07-22 | End: 2023-07-24

## 2023-07-22 RX ADMIN — FUROSEMIDE 20 MG: 10 INJECTION, SOLUTION INTRAMUSCULAR; INTRAVENOUS at 04:07

## 2023-07-22 RX ADMIN — DEXTROSE MONOHYDRATE 1000 ML: 50 INJECTION, SOLUTION INTRAVENOUS at 01:07

## 2023-07-22 RX ADMIN — FAMOTIDINE 20 MG: 10 INJECTION, SOLUTION INTRAVENOUS at 08:07

## 2023-07-22 RX ADMIN — ENOXAPARIN SODIUM 30 MG: 30 INJECTION SUBCUTANEOUS at 08:07

## 2023-07-22 RX ADMIN — ATORVASTATIN CALCIUM 20 MG: 10 TABLET, FILM COATED ORAL at 08:07

## 2023-07-22 RX ADMIN — OXYBUTYNIN CHLORIDE 5 MG: 5 TABLET ORAL at 08:07

## 2023-07-22 RX ADMIN — GUAIFENESIN 400 MG: 200 SOLUTION ORAL at 01:07

## 2023-07-22 RX ADMIN — PROPOFOL 40 MCG/KG/MIN: 10 INJECTION, EMULSION INTRAVENOUS at 06:07

## 2023-07-22 RX ADMIN — IPRATROPIUM BROMIDE AND ALBUTEROL SULFATE 3 ML: 2.5; .5 SOLUTION RESPIRATORY (INHALATION) at 08:07

## 2023-07-22 RX ADMIN — CEFTRIAXONE SODIUM 2 G: 2 INJECTION, POWDER, FOR SOLUTION INTRAMUSCULAR; INTRAVENOUS at 12:07

## 2023-07-22 RX ADMIN — PROPOFOL 40 MCG/KG/MIN: 10 INJECTION, EMULSION INTRAVENOUS at 12:07

## 2023-07-22 RX ADMIN — SODIUM CHLORIDE 30 MG/ML INHALATION SOLUTION 4 ML: 30 SOLUTION INHALANT at 08:07

## 2023-07-22 RX ADMIN — SODIUM CHLORIDE, POTASSIUM CHLORIDE, SODIUM LACTATE AND CALCIUM CHLORIDE 500 ML: 600; 310; 30; 20 INJECTION, SOLUTION INTRAVENOUS at 09:07

## 2023-07-22 RX ADMIN — PROPOFOL 40 MCG/KG/MIN: 10 INJECTION, EMULSION INTRAVENOUS at 08:07

## 2023-07-22 RX ADMIN — DOXYCYCLINE HYCLATE 100 MG: 100 TABLET, COATED ORAL at 09:07

## 2023-07-22 RX ADMIN — CARVEDILOL 25 MG: 12.5 TABLET, FILM COATED ORAL at 08:07

## 2023-07-22 RX ADMIN — MICONAZOLE NITRATE 2 % TOPICAL POWDER: at 08:07

## 2023-07-22 RX ADMIN — DOXYCYCLINE HYCLATE 100 MG: 100 TABLET, COATED ORAL at 08:07

## 2023-07-22 RX ADMIN — IPRATROPIUM BROMIDE AND ALBUTEROL SULFATE 3 ML: 2.5; .5 SOLUTION RESPIRATORY (INHALATION) at 07:07

## 2023-07-22 RX ADMIN — DEXTROSE MONOHYDRATE: 50 INJECTION, SOLUTION INTRAVENOUS at 06:07

## 2023-07-22 RX ADMIN — FENOFIBRATE 48 MG: 48 TABLET, FILM COATED ORAL at 08:07

## 2023-07-22 RX ADMIN — DEXTROSE MONOHYDRATE: 50 INJECTION, SOLUTION INTRAVENOUS at 12:07

## 2023-07-22 RX ADMIN — SODIUM CHLORIDE 30 MG/ML INHALATION SOLUTION 4 ML: 30 SOLUTION INHALANT at 07:07

## 2023-07-22 RX ADMIN — SENNOSIDES AND DOCUSATE SODIUM 2 TABLET: 50; 8.6 TABLET ORAL at 08:07

## 2023-07-22 RX ADMIN — GUAIFENESIN 400 MG: 200 SOLUTION ORAL at 10:07

## 2023-07-22 RX ADMIN — PROPOFOL 40 MCG/KG/MIN: 10 INJECTION, EMULSION INTRAVENOUS at 04:07

## 2023-07-22 RX ADMIN — GUAIFENESIN 400 MG: 200 SOLUTION ORAL at 05:07

## 2023-07-22 RX ADMIN — AMLODIPINE BESYLATE 10 MG: 5 TABLET ORAL at 08:07

## 2023-07-22 NOTE — PROGRESS NOTES
[facility] - [facility] ICU  Pulmonary Critical Care Note    Patient Name: Bianca Khan  MRN: [id number]  Admission Date: 6/28/2023  Hospital Length of Stay: 24 days  Code Status: Full Code  Attending Provider: Khris Treadwell Jr., MD, *  Primary Care Provider: Areli Vargas PA-C     Subjective:     HPI:   Bianca Khan is a 58 y.o. male with PMH of paraplegia 2/2 spinal cord injury (T1-T6), neurogenic bladder with suprapubic catheter, chronic right ankle osteomyelitis, DM II, HTN, who presented to the ED on 6/28/2023 with CC of right knee pain. Per chart review, CT of right knee revealed 5 cm area of subcutaneous fluid in prepatellar region which was aspirated in the ED; he was taken to the OR on 6/29/2023 by orthopedic surgery team and was found to have prepatellar bursa infection and septic arthritis. Wound culture 6/29/2023 positive for strep agalactiae. Orthopedic surgery team recommended right-sided above-knee amputation d/t chronically infected hardware in right lower extremity. On 7/4/2023, a RRT was called d/t acute respiratory distress that was not improving despite being placed on vapotherm at 35 L/min with FiO2 100%. At the time of examination, patient's initial GCS was 10 but progressively reduced to a GCS of 6. Shared decision with patient's wife was made to intubate patient for airway protection though ABGs were within normal limits. He was admitted to the ICU for close monitoring and further medical management.    Hospital Course/Significant events:  6/29/2023 - I&D of right knee  7/4/2023 - Admitted to ICU, intubated for airway protection d/t altered mental status. Trialysis catheter placed and HD began  7/18/2023 - Extubated  7/21/23 - Reintubated 2/2 increasing O2 requirements    24 Hour Interval History:  Started to have increasing O2 requirements yesterday, up to 100% on BiPAP. Ultimately, patient was intubated and had a bronchoscopy, which revealed severe mucus plugging. NG  tube was placed and started on PO medications, along with doxycycline.    Overnight, had numerous diffuse watery bowel movements and has very low urine output despite receiving proper maintenance fluid rates. Afebrile and no leukocytosis found on labs. BUN stable, Cr slightly increased from 2.07 to 2.62. ABGs improved this morning.       Past Medical History:   Diagnosis Date    Arthritis     Chronic ulcer of ankle 05/26/2022    Frequent UTI 07/02/2019    Generalized anxiety disorder 05/26/2022    Neurogenic bladder 05/26/2022    Osteomyelitis 05/26/2022    Presence of suprapubic catheter 05/26/2022    Pure hypercholesterolemia 05/26/2022    Retention of urine, unspecified 08/09/2019    Spinal cord injury at T1-T6 level 04/20/2018       Past Surgical History:   Procedure Laterality Date    INCISION AND DRAINAGE, LOWER EXTREMITY Right 6/29/2023    Procedure: INCISION AND DRAINAGE, LOWER EXTREMITY;  Surgeon: Prabhu Shen DO;  Location: [facility];  Service: Orthopedics;  Laterality: Right;  supine bone foam wash stuff cultures    INSERTION OF INTRAMEDULLARY MARISA Right 8/10/2022    Procedure: INSERTION, INTRAMEDULLARY MARISA RIGHT TIBIA;  Surgeon: Jorge Orellana MD;  Location: [facility];  Service: Orthopedics;  Laterality: Right;       Social History     Socioeconomic History    Marital status:    Tobacco Use    Smoking status: Every Day     Types: Cigars, Cigarettes    Smokeless tobacco: Never   Substance and Sexual Activity    Alcohol use: Yes     Alcohol/week: 2.0 standard drinks     Types: 2 Cans of beer per week    Drug use: Not Currently    Sexual activity: Never       Current Outpatient Medications   Medication Instructions    amitriptyline (ELAVIL) 50 mg, Oral, Nightly    amLODIPine (NORVASC) 10 MG tablet 1 tablet    atorvastatin (LIPITOR) 20 mg, Oral, Nightly    busPIRone (BUSPAR) 5 MG Tab 1 tablet    carvediloL (COREG) 12.5 mg, Oral    cyclobenzaprine (FLEXERIL) 10 mg, Oral, 2 times daily PRN    doxycycline  (VIBRAMYCIN) 100 mg, Oral, Daily    fenofibrate 160 mg, Oral    FLUoxetine 20 mg, Oral    gabapentin (NEURONTIN) 300 MG capsule 1 capsule    lisinopriL (PRINIVIL,ZESTRIL) 10 mg, Oral, Nightly    lisinopriL 10 mg, Oral, Daily    LORazepam (ATIVAN) 1 mg, Oral, 2 times daily    melatonin (MELATIN) 3 mg tablet TAKE TWO TABLETS BY MOUTH EVERY NIGHT AT BEDTIME AS NEEDED FOR INSOMNIA.    meloxicam (MOBIC) 15 mg, Oral, Daily    metFORMIN (GLUCOPHAGE) 500 MG tablet SMARTSI Tablet(s) By Mouth Morning-Evening    oxybutynin (DITROPAN) 5 mg, Oral, 2 times daily    oxyCODONE-acetaminophen (PERCOCET)  mg per tablet 1 tablet, Oral, Every 6 hours PRN    pantoprazole (PROTONIX) 40 MG tablet 1 tablet    temazepam (RESTORIL) 30 mg, Oral, Nightly    traZODone (DESYREL) 50 mg, Oral, Nightly    XARELTO 20 mg Tab SMARTSI Tablet(s) By Mouth Every Evening     Current Inpatient Medications   albuterol-ipratropium  3 mL Nebulization BID    amLODIPine  10 mg Oral Daily    atorvastatin  20 mg Per NG tube Nightly    carvediloL  25 mg Per NG tube BID    cefTRIAXone (ROCEPHIN) IVPB  2 g Intravenous Q24H    cloNIDine 0.2 mg/24 hr td ptwk  1 patch Transdermal Q7 Days    doxycycline  100 mg Per NG tube Q12H    enoxaparin  30 mg Subcutaneous Q12H    famotidine (PF)  20 mg Intravenous Daily    fenofibrate  48 mg Per NG tube Daily    guaiFENesin 100 mg/5 ml  400 mg Per NG tube Q4H    miconazole NITRATE 2 %   Topical (Top) BID    oxybutynin  5 mg Per NG tube BID    senna-docusate 8.6-50 mg  2 tablet Per NG tube BID    sodium chloride 3%  4 mL Nebulization BID    sodium citrate-citric acid 500-334 mg/5 ml  30 mL Oral Once     Current Intravenous Infusions   clevidipine Stopped (23 1900)    dexmedeTOMIDine (Precedex) infusion (titrating) 0.2 mcg/kg/hr (23)    dextrose 5 % (D5W) 125 mL/hr at 23 0632    NORepinephrine bitartrate-D5W Stopped (23 1039)    propofoL 40 mcg/kg/min (23 0632)     Review of Systems    Unable to perform ROS: Intubated       Objective:     Intake/Output Summary (Last 24 hours) at 7/22/2023 0633  Last data filed at 7/22/2023 0600  Gross per 24 hour   Intake 1709.04 ml   Output 1431 ml   Net 278.04 ml       Vital Signs (Most Recent):  Temp: 98.8 °F (37.1 °C) (07/22/23 0400)  Pulse: 72 (07/22/23 0615)  Resp: (!) 22 (07/22/23 0615)  BP: 113/81 (07/22/23 0615)  SpO2: 100 % (07/22/23 0615)  Body mass index is 27.51 kg/m².  Weight: 86.2 kg (190 lb) Vital Signs (24h Range):  Temp:  [97.9 °F (36.6 °C)-99.1 °F (37.3 °C)] 98.8 °F (37.1 °C)  Pulse:  [] 72  Resp:  [14-30] 22  SpO2:  [86 %-100 %] 100 %  BP: (105-175)/() 113/81     Physical Exam  Vitals and nursing note reviewed.   Constitutional:       Interventions: He is sedated.   HENT:      Head: Normocephalic and atraumatic.      Nose: Nose normal.      Mouth/Throat:      Comments: ET tube in place  Cardiovascular:      Rate and Rhythm: Normal rate and regular rhythm.      Pulses: Normal pulses.      Heart sounds: Normal heart sounds. No murmur heard.    No friction rub. No gallop.   Pulmonary:      Effort: No respiratory distress.      Breath sounds: No rhonchi.      Comments: Decreased breath sounds on left side, right side clear to auscultation  Abdominal:      General: Bowel sounds are normal. There is no distension.      Palpations: Abdomen is soft.      Tenderness: There is no abdominal tenderness.   Musculoskeletal:      Cervical back: Neck supple.      Comments: Wound VAC in place to the right knee draining bloody fluid with a total of 300 mL   Skin:     General: Skin is warm and dry.      Capillary Refill: Capillary refill takes less than 2 seconds.   Neurological:      Mental Status: He is alert.      Comments: Intubated, sedated   Psychiatric:         Mood and Affect: Mood and affect normal.         Behavior: Behavior is cooperative.      Comments:        Lines/Drains/Airways       Drain  Duration                  Suprapubic  Catheter 07/06/23 1544 100% silicone 22 Fr. 15 days         NG/OG Tube 07/21/23 1000 [place] sump 16 Fr. Left nostril <1 day         Rectal Tube 07/22/23 0400 rectal tube w/ balloon (indicate number of mLs) <1 day              Airway  Duration                  Airway - Non-Surgical 07/21/23 1655 <1 day              Peripheral Intravenous Line  Duration                  Midline Catheter Insertion/Assessment  - Single Lumen 07/02/23 2128 Right basilic vein (medial side of arm) 19 days         Peripheral IV - Single Lumen 07/04/23 0600 18 G Anterior;Left Forearm 18 days         Peripheral IV - Single Lumen 07/04/23 0600 20 G Left;Posterior Hand 18 days                  Significant Labs:  Lab Results   Component Value Date    WBC 10.93 07/22/2023    HGB 10.6 (L) 07/22/2023    HCT 34.0 (L) 07/22/2023    MCV 79.3 (L) 07/22/2023     (H) 07/22/2023       BMP  Lab Results   Component Value Date     07/22/2023    K 4.1 07/22/2023    CHLORIDE 105 07/22/2023    CO2 25 07/22/2023    BUN 60.5 (H) 07/22/2023    CREATININE 2.62 (H) 07/22/2023    CALCIUM 9.1 07/22/2023    AGAP 9.0 08/29/2022    ESTGFRAFRICA 101 05/11/2022    EGFRNONAA >60 04/27/2022     ABG  Recent Labs   Lab 07/22/23 0439   PH 7.480*   PO2 78.0*   HCO3 28.3*       Mechanical Ventilation Support:  Vent Mode: A/C (07/22/23 0430)  Ventilator Initiated: Yes (07/21/23 1655)  Set Rate: 22 BPM (07/22/23 0430)  Vt Set: 460 mL (07/22/23 0430)  Pressure Support: 12 cmH20 (07/18/23 1031)  PEEP/CPAP: 10 cmH20 (07/22/23 0430)  Oxygen Concentration (%): 50 (07/22/23 0445)  Peak Airway Pressure: 26 cmH20 (07/22/23 0430)  Total Ve: 9.1 L/m (07/22/23 0430)  F/VT Ratio<105 (RSBI): (!) 53.66 (07/22/23 0430)    Significant Imaging:  I have reviewed the pertinent imaging within the past 24 hours.    Assessment/Plan:     Assessment  Extensive bilateral pulmonary infiltrates  DX: Infectious pneumonia versus ARDS versus hydrostatic/non hydrostatic pulmonary edema   Sputum  culture showed moderate yeast  Intubated 07/04/2023  Extubated 07/18/2023  Prepatellar bursitis/septic arthritis of the right knee (strep agalactiae)   Post I&D on 06/29/2023   Ortho no longer planning for right above-the-knee amputation in near future due to infectious suppression  Acute renal failure, oliguric  Initiation of hemodialysis on 07/04/2023  Acute encephalopathy, likely metabolic related to above, currently clouded by sedation   Reported paroxysmal atrial fibrillation with permenant pacemaker, currently in sinus rhythm   H/o chronic paraplegia at T1 2/2 spinal cordy injury, neurogenic bladder, chronic right ankle osteomyelitis, DM2, HTN    Plan  -Continue ICU level of care for ongoing monitoring and medical management  -ID, urology, orthopedics, wound care teams following, appreciate their assistance   -HD/Ultrafiltration as per nephrology  -per ID Merrem discontinued and switched to ceftriaxone with end date of 08/09 and continuing suppressive doxycycline with further plan of 6 week course for GBS septic arthritis and following inflammatory markers; repeat cultures (7/9/23) NGTD.  -Will trial methylnaltrexone for constipation, failed lactulose, only small amount of output over course of past few days with belly distension  - Bronchoscopy revealed mucus plugging. Due to necessity for prolonged intubation, will likely need tracheostomy, which was approved by the wife  - Currently low urine output despite receiving maintenance fluids. Bladder scan revealed 25 CC urine. Suprapubic catheter present, unlikely urinary obstruction. Will continue to monitor. Appreciate assistance from Nephrology.  - NG tube placed after being intubated, can restart home antihypertensives    DVT Prophylaxis: Lovenox 30   GI Prophylaxis: Pepcid 20    Woody Azul DO PGY-II  Pulmonary Critical Care Medicine  [facility] - [facility] ICU

## 2023-07-22 NOTE — PROGRESS NOTES
Patient re-intubated yesterday PM. NGT placed. Bedside swallow evaluation no longer appropriate. ST to sign off.

## 2023-07-22 NOTE — NURSING
Nurses Note -- 4 Eyes      7/21/2023   11:53 PM      Skin assessed during: Q Shift Change      [] No Altered Skin Integrity Present    [x]Prevention Measures Documented      [x] Yes- Altered Skin Integrity Present or Discovered   [] LDA Added if Not in Epic (Describe Wound)   [] New Altered Skin Integrity was Present on Admit and Documented in LDA   [] Wound Image Taken    Wound Care Consulted? Yes    Attending Nurse:  Amauri Angeles RN     Second RN/Staff Member:  Claire Colon

## 2023-07-22 NOTE — PROGRESS NOTES
Infectious Diseases Progress Note  58-year-old male with past medical history of T-spine cord injury with paraplegia, diabetes, HTN, hepatitis-C, chronic right ankle wound/osteomyelitis, was on suppressive doxycycline, known to my service, seen by us initially at [facility] and has followed with us at the office for chronic osteomyelitis, is admitted to [facility] on 06/28/2023 presenting through the ED with complaints of pain and swelling to his right knee, apparently struck his knee.  He did also report prior remote right knee surgery.  He was evaluated and noted to have no fevers initially but a temperature of 100.2° today 6/29, no leukocytosis but with thrombocytosis of up to 531 today.  .2 and ESR >130.  He is anemic with low albumin.  Blood cultures have been negative.  CT scan of the right knee noted a 5 cm area of subcutaneous fluid collection in the prepatellar region.  There was aspiration in the ER with findings of purulent fluid and cultures showing group B Streptococcus.  He was seen by the orthopedic surgery team with findings of right prepatellar bursa abscess and is status post incision and drainage of right knee septic arthritis and right prepatellar bursa abscess today 6/29 with group B Streptococcus       Subjective:  Remains in ICU. Lying in bed in no acute distress. No new complaints reported. Low grade temp. Currently on 5L Oxymask    ROS  Constitutional:  Positive for malaise/fatigue.   HENT: Negative.     Eyes: Negative.    Respiratory: Negative.     Cardiovascular: Negative.    Gastrointestinal: Negative.    Musculoskeletal: Negative.    Skin:         Multiple wounds   Neurological:  Positive for focal weakness and weakness.   All other systems reviewed and are negative.    Review of patient's allergies indicates:   Allergen Reactions    Baclofen Itching and Anxiety       Past Medical History:   Diagnosis Date    Arthritis     Chronic ulcer of  ankle 05/26/2022    Frequent UTI 07/02/2019    Generalized anxiety disorder 05/26/2022    Neurogenic bladder 05/26/2022    Osteomyelitis 05/26/2022    Presence of suprapubic catheter 05/26/2022    Pure hypercholesterolemia 05/26/2022    Retention of urine, unspecified 08/09/2019    Spinal cord injury at T1-T6 level 04/20/2018       Past Surgical History:   Procedure Laterality Date    INCISION AND DRAINAGE, LOWER EXTREMITY Right 6/29/2023    Procedure: INCISION AND DRAINAGE, LOWER EXTREMITY;  Surgeon: Prabhu Shen DO;  Location: [facility];  Service: Orthopedics;  Laterality: Right;  supine bone foam wash stuff cultures    INSERTION OF INTRAMEDULLARY MARISA Right 8/10/2022    Procedure: INSERTION, INTRAMEDULLARY MARISA RIGHT TIBIA;  Surgeon: Jorge Orellana MD;  Location: [facility];  Service: Orthopedics;  Laterality: Right;       Social History     Socioeconomic History    Marital status:    Tobacco Use    Smoking status: Every Day     Types: Cigars, Cigarettes    Smokeless tobacco: Never   Substance and Sexual Activity    Alcohol use: Yes     Alcohol/week: 2.0 standard drinks     Types: 2 Cans of beer per week    Drug use: Not Currently    Sexual activity: Never         Scheduled Meds:   fentaNYL        albuterol-ipratropium  3 mL Nebulization BID    amLODIPine  10 mg Oral Daily    atorvastatin  20 mg Per NG tube Nightly    carvediloL  25 mg Per NG tube BID    cefTRIAXone (ROCEPHIN) IVPB  2 g Intravenous Q24H    cloNIDine 0.2 mg/24 hr td ptwk  1 patch Transdermal Q7 Days    doxycycline  100 mg Per NG tube Q12H    enoxaparin  30 mg Subcutaneous Q12H    famotidine (PF)  20 mg Intravenous Daily    fenofibrate  48 mg Per NG tube Daily    guaiFENesin 100 mg/5 ml  400 mg Per NG tube Q4H    miconazole NITRATE 2 %   Topical (Top) BID    oxybutynin  5 mg Per NG tube BID    senna-docusate 8.6-50 mg  2 tablet Per NG tube BID    sodium chloride 3%  4 mL Nebulization BID    sodium citrate-citric acid 500-334 mg/5 ml  30 mL Oral  "Once     Continuous Infusions:   clevidipine 8 mg/hr (23 1552)    dexmedeTOMIDine (Precedex) infusion (titrating) 0.2 mcg/kg/hr (23)    dextrose 5 % (D5W) 125 mL/hr at 23 1503    NORepinephrine bitartrate-D5W Stopped (23 1039)    propofoL 40 mcg/kg/min (23)     PRN Meds:sodium chloride, acetaminophen, etomidate, [] fentaNYL **FOLLOWED BY** fentaNYL, fentaNYL, hydrALAZINE, labetalol, oxyCODONE-acetaminophen, rocuronium, sodium chloride 0.9%, sodium chloride 0.9%    Objective:  BP (!) 132/91   Pulse 103   Temp 99 °F (37.2 °C)   Resp (!) 22   Ht 5' 9.69" (1.77 m)   Wt 86.2 kg (190 lb)   SpO2 99%   BMI 27.51 kg/m²     Physical Exam:   Physical Exam  Vitals reviewed.   HENT:      Head: Normocephalic.   Cardiovascular:      Rate and Rhythm: Normal rate and regular rhythm.   Pulmonary:      Effort: Pulmonary effort is normal. No respiratory distress.      Breath sounds: Normal breath sounds. No wheezing.      Comments: Currently on 5L Oxymask  Abdominal:      General: Bowel sounds are normal. There is no distension.      Palpations: Abdomen is soft.      Tenderness: There is no abdominal tenderness.   Genitourinary:     Comments: Suprapubic catheter  Musculoskeletal:      Cervical back: Normal range of motion.      Comments: Paraplegic   Skin:     Findings: No rash.      Comments: Wounds dressed. R knee with wound vac in place   Neurological:      Mental Status: He arouses to verbal stimuli, answers simple questions appropriately. Follows commands.   Psychiatric:         Mood and Affect: Mood normal.         Behavior: Behavior normal.      Imaging  23 CXR  FINDINGS:   Left-sided cardiac device remains.  No focal opacification.  No pneumothorax.  Small left effusion remains which appears improved in the interval.     Lab Review   Recent Results (from the past 24 hour(s))   RT Blood Gas    Collection Time: 23 12:54 AM   Result Value Ref Range    Sample Type " Arterial Blood     Sample site Left Radial Artery     Drawn by shabbir ferguson     pH, Blood gas 7.500 (H) 7.350 - 7.450    pCO2, Blood gas 41.0 35.0 - 45.0 mmHg    pO2, Blood gas 95.0 80.0 - 100.0 mmHg    Sodium, Blood Gas 146 (H) 137 - 145 mmol/L    Potassium, Blood Gas 3.4 (L) 3.5 - 5.0 mmol/L    Calcium Level Ionized 1.20 1.12 - 1.23 mmol/L    TOC2, Blood gas 33.3 mmol/L    Base Excess, Blood gas 8.10 (H) -2.00 - 2.00 mmol/L    sO2, Blood gas 98.0 %    HCO3, Blood gas 32.0 (H) 22.0 - 26.0 mmol/L    THb, Blood gas 10.6 (L) 12 - 16 g/dL    O2 Hb, Blood Gas 96.2 94.0 - 97.0 %    CO Hgb 0.9 0.5 - 1.5 %    Met Hgb 0.9 0.4 - 1.5 %    Allens Test Yes     MODE BiPAP     FIO2, Blood gas 60 %    BiPAP (I) 16 cm H2O    BiPAP (E) 8 cm H2O   Renal Function Panel    Collection Time: 07/21/23  1:40 AM   Result Value Ref Range    Sodium Level 147 (H) 136 - 145 mmol/L    Potassium Level 3.8 3.5 - 5.1 mmol/L    Chloride 107 98 - 107 mmol/L    Carbon Dioxide 26 22 - 29 mmol/L    Glucose Level 146 (H) 74 - 100 mg/dL    Blood Urea Nitrogen 61.2 (H) 8.4 - 25.7 mg/dL    Creatinine 2.07 (H) 0.73 - 1.18 mg/dL    Calcium Level Total 9.5 8.4 - 10.2 mg/dL    Albumin Level 3.0 (L) 3.5 - 5.0 g/dL    Phosphorus Level 2.7 2.3 - 4.7 mg/dL    eGFR 36 mls/min/1.73/m2   CBC with Differential    Collection Time: 07/21/23  1:40 AM   Result Value Ref Range    WBC 10.67 4.50 - 11.50 x10(3)/mcL    RBC 4.11 (L) 4.70 - 6.10 x10(6)/mcL    Hgb 10.5 (L) 14.0 - 18.0 g/dL    Hct 32.7 (L) 42.0 - 52.0 %    MCV 79.6 (L) 80.0 - 94.0 fL    MCH 25.5 (L) 27.0 - 31.0 pg    MCHC 32.1 (L) 33.0 - 36.0 g/dL    RDW 22.0 (H) 11.5 - 17.0 %    Platelet 854 (H) 130 - 400 x10(3)/mcL    MPV 9.6 7.4 - 10.4 fL    Neut % 66.0 %    Lymph % 20.1 %    Mono % 12.2 %    Eos % 0.2 %    Basophil % 0.5 %    Lymph # 2.15 0.6 - 4.6 x10(3)/mcL    Neut # 7.04 2.1 - 9.2 x10(3)/mcL    Mono # 1.30 0.1 - 1.3 x10(3)/mcL    Eos # 0.02 0 - 0.9 x10(3)/mcL    Baso # 0.05 <=0.2 x10(3)/mcL    IG# 0.11 (H) 0 -  0.04 x10(3)/mcL    IG% 1.0 %    NRBC% 1.3 %       Assessment/Plan:  1. SIRS with fevers  2. Group B Streptococcus Right knee prepatellar abscess  3. Group B Streptococcus Right knee septic arthritis  4.  Anemia/reactive thrombocytosis  5.  Paraplegia secondary to T-spine cord injury  6. History of hepatitis-C   7. Chronic right ankle wound/osteomyelitis  8.  Diabetes    9.  Acute kidney injury  10. Acute hypoxic respiratory failure  11. Pulmonary edema with pleural effusions/ Pneumonitis      -Continue ceftriaxone with end date of 08/09 and maintain chronic suppressive doxycycline, following inflammatory markers  -Low grade temp without leukocytosis   -7/4 and and 7/8 blood cultures negative and sputum culture with moderate yeast.    -7/14 CXR results noted  -7/4 CT scan of the abdomen and pelvis and 7/3 chest x-ray results noted, likely dealing with pulmonary edema with pleural effusions and possibly superimposed infectious pneumonitis. We will get procalcitonin level for further evaluation  -6/28 right knee abscess culture with Group B Streptococcus  -Seen by Orthopedic surgery team s/p I&D of R prepatellar bursa abscess and septic R knee with cultures yielding Group G Streptococcus  -6/28 blood cultures negative  -Multiple comorbidities, paraplegic with chronic pressure wounds, right ankle osteomyelitis with exposed bone on chronic suppressive doxycycline  -We will continue surgical site care per Orthopedic surgery team  -We will continue wound care  -He is to continue management of his hepatitis-C as outpatient  -Renal impairment noted, HD per Nephrology, started 7/4  -7/3 Renal ultrasound results noted  -Extubated and currently on 5L Oxymask  -Discussed with patient and nursing staff.

## 2023-07-22 NOTE — NURSING
Nurses Note -- 4 Eyes      7/22/2023   4:48 PM      Skin assessed during: Daily Assessment      [] No Altered Skin Integrity Present    [x]Prevention Measures Documented      [x] Yes- Altered Skin Integrity Present or Discovered   [] LDA Added if Not in Epic (Describe Wound)   [] New Altered Skin Integrity was Present on Admit and Documented in LDA   [] Wound Image Taken    Wound Care Consulted? Yes    Attending Nurse:  Lazaro Kilgore RN     Second RN/Staff Member:  Nick SPENCER RN

## 2023-07-22 NOTE — PROGRESS NOTES
Progress Note  Nephrology    Admit Date: 6/28/2023   LOS: 24 days     SUBJECTIVE:     Follow-up For:  ANTON / ATN    Interval History:  59 Y/O male with history of paraplegia , H/O suprapubic cath with frequent UTI admitted for Right knee infection and septic joint   Had high BUN and CR with decreased urine out put and started on Hemodialysis   Pt s urine out put improved with increasing urine out put , pts bun and cr improved and dialysis stopped .   Extubated but last night uintubated again due to hypoxia on BIPAP  Had broncoscopy done this morning and had mucos plug on left main broncus   Todays creatinin slightly wose than yesterday . Todays cr is 2.6     Review of Systems:  Constitutional: No fever or chills, sedated , intubated , on vent   Respiratory: No cough or shortness of breath  Cardiovascular: No chest pain or palpitations  Gastrointestinal: No nausea or vomiting  Neurological: No confusion or weakness    OBJECTIVE:     Vital Signs Range (Last 24H):  Vitals:    07/22/23 1200   BP: (!) 149/84   Pulse: 75   Resp: 20   Temp: 97.7 °F (36.5 °C)       Temp:  [97.7 °F (36.5 °C)-99.1 °F (37.3 °C)]   Pulse:  []   Resp:  [14-30]   BP: ()/()   SpO2:  [86 %-100 %]     I & O (Last 24H):  Intake/Output Summary (Last 24 hours) at 7/22/2023 1253  Last data filed at 7/22/2023 1200  Gross per 24 hour   Intake 1709.04 ml   Output 1731 ml   Net -21.96 ml       Physical Exam:  On vent and sedated   Head   normal   Neck   supple   Chest   symmetric , no retraction   Lungs   clear   Heart   RRR  Abdomen   soft , non tender   Ext  no edema     Laboratory Data:    Recent Labs   Lab 07/21/23  0140 07/22/23  0208   * 143   K 3.8 4.1   CO2 26 25   BUN 61.2* 60.5*   CREATININE 2.07* 2.62*   GLUCOSE 146* 119*   CALCIUM 9.5 9.1   PHOS 2.7  --      Lab Results   Component Value Date    .3 (H) 07/04/2023    CALCIUM 9.1 07/22/2023    CAION 1.16 07/22/2023    PHOS 2.7 07/21/2023     Lab Results   Component  Value Date    IRON 35 (L) 12/30/2021    TIBC 284 12/30/2021    FERRITIN 24.99 12/30/2021       Medications:  Medication list was reviewed and changes noted under Assessment/Plan.    Diagnostic Results:    Reviewed most recent CT/US/Echo/MRI    ASSESSMENT/PLAN:   1   ANTON / ATN  2   CKD 4  3   SHPT  4   hypernatremia   better today   5   respiratory failure , on VENT  6   paraplegia  7   A Fib  8   DM2  9   Right knee infection       Plan   lasix already stopped             Continue with D5W and water flushes            Labs in AM

## 2023-07-23 LAB
ALBUMIN SERPL-MCNC: 2.6 G/DL (ref 3.5–5)
BASOPHILS # BLD AUTO: 0.04 X10(3)/MCL
BASOPHILS NFR BLD AUTO: 0.4 %
BUN SERPL-MCNC: 73.1 MG/DL (ref 8.4–25.7)
CALCIUM SERPL-MCNC: 8.2 MG/DL (ref 8.4–10.2)
CHLORIDE SERPL-SCNC: 98 MMOL/L (ref 98–107)
CO2 SERPL-SCNC: 20 MMOL/L (ref 22–29)
CREAT SERPL-MCNC: 2.29 MG/DL (ref 0.73–1.18)
EOSINOPHIL # BLD AUTO: 0.21 X10(3)/MCL (ref 0–0.9)
EOSINOPHIL NFR BLD AUTO: 2 %
ERYTHROCYTE [DISTWIDTH] IN BLOOD BY AUTOMATED COUNT: 21.9 % (ref 11.5–17)
GFR SERPLBLD CREATININE-BSD FMLA CKD-EPI: 32 MLS/MIN/1.73/M2
GLUCOSE SERPL-MCNC: 137 MG/DL (ref 74–100)
HCT VFR BLD AUTO: 31.4 % (ref 42–52)
HGB BLD-MCNC: 10 G/DL (ref 14–18)
IMM GRANULOCYTES # BLD AUTO: 0.06 X10(3)/MCL (ref 0–0.04)
IMM GRANULOCYTES NFR BLD AUTO: 0.6 %
LYMPHOCYTES # BLD AUTO: 2.81 X10(3)/MCL (ref 0.6–4.6)
LYMPHOCYTES NFR BLD AUTO: 27.2 %
MAGNESIUM SERPL-MCNC: 1.3 MG/DL (ref 1.6–2.6)
MCH RBC QN AUTO: 24.9 PG (ref 27–31)
MCHC RBC AUTO-ENTMCNC: 31.8 G/DL (ref 33–36)
MCV RBC AUTO: 78.3 FL (ref 80–94)
MONOCYTES # BLD AUTO: 1.07 X10(3)/MCL (ref 0.1–1.3)
MONOCYTES NFR BLD AUTO: 10.4 %
NEUTROPHILS # BLD AUTO: 6.13 X10(3)/MCL (ref 2.1–9.2)
NEUTROPHILS NFR BLD AUTO: 59.4 %
NRBC BLD AUTO-RTO: 0.8 %
PHOSPHATE SERPL-MCNC: 5.2 MG/DL (ref 2.3–4.7)
PLATELET # BLD AUTO: 318 X10(3)/MCL (ref 130–400)
PMV BLD AUTO: 9.8 FL (ref 7.4–10.4)
POTASSIUM SERPL-SCNC: 3.5 MMOL/L (ref 3.5–5.1)
RBC # BLD AUTO: 4.01 X10(6)/MCL (ref 4.7–6.1)
SODIUM SERPL-SCNC: 134 MMOL/L (ref 136–145)
WBC # SPEC AUTO: 10.32 X10(3)/MCL (ref 4.5–11.5)

## 2023-07-23 PROCEDURE — 94640 AIRWAY INHALATION TREATMENT: CPT

## 2023-07-23 PROCEDURE — 63600175 PHARM REV CODE 636 W HCPCS: Performed by: INTERNAL MEDICINE

## 2023-07-23 PROCEDURE — 25000003 PHARM REV CODE 250: Performed by: INTERNAL MEDICINE

## 2023-07-23 PROCEDURE — 25000242 PHARM REV CODE 250 ALT 637 W/ HCPCS: Performed by: INTERNAL MEDICINE

## 2023-07-23 PROCEDURE — 94003 VENT MGMT INPAT SUBQ DAY: CPT

## 2023-07-23 PROCEDURE — 99900026 HC AIRWAY MAINTENANCE (STAT)

## 2023-07-23 PROCEDURE — 99900035 HC TECH TIME PER 15 MIN (STAT)

## 2023-07-23 PROCEDURE — 85025 COMPLETE CBC W/AUTO DIFF WBC: CPT

## 2023-07-23 PROCEDURE — 80069 RENAL FUNCTION PANEL: CPT | Performed by: INTERNAL MEDICINE

## 2023-07-23 PROCEDURE — 94761 N-INVAS EAR/PLS OXIMETRY MLT: CPT

## 2023-07-23 PROCEDURE — 25000003 PHARM REV CODE 250

## 2023-07-23 PROCEDURE — 27000221 HC OXYGEN, UP TO 24 HOURS

## 2023-07-23 PROCEDURE — 20000000 HC ICU ROOM

## 2023-07-23 PROCEDURE — 25000003 PHARM REV CODE 250: Performed by: STUDENT IN AN ORGANIZED HEALTH CARE EDUCATION/TRAINING PROGRAM

## 2023-07-23 PROCEDURE — 83735 ASSAY OF MAGNESIUM: CPT | Performed by: INTERNAL MEDICINE

## 2023-07-23 PROCEDURE — 63600175 PHARM REV CODE 636 W HCPCS

## 2023-07-23 RX ORDER — MAGNESIUM SULFATE HEPTAHYDRATE 40 MG/ML
2 INJECTION, SOLUTION INTRAVENOUS ONCE
Status: DISCONTINUED | OUTPATIENT
Start: 2023-07-23 | End: 2023-07-23

## 2023-07-23 RX ORDER — POTASSIUM CHLORIDE 20 MEQ/1
40 TABLET, EXTENDED RELEASE ORAL ONCE
Status: DISCONTINUED | OUTPATIENT
Start: 2023-07-23 | End: 2023-07-23

## 2023-07-23 RX ORDER — MAGNESIUM SULFATE HEPTAHYDRATE 40 MG/ML
4 INJECTION, SOLUTION INTRAVENOUS ONCE
Status: COMPLETED | OUTPATIENT
Start: 2023-07-23 | End: 2023-07-23

## 2023-07-23 RX ADMIN — DOXYCYCLINE HYCLATE 100 MG: 100 TABLET, COATED ORAL at 08:07

## 2023-07-23 RX ADMIN — CEFTRIAXONE SODIUM 2 G: 2 INJECTION, POWDER, FOR SOLUTION INTRAMUSCULAR; INTRAVENOUS at 11:07

## 2023-07-23 RX ADMIN — GUAIFENESIN 400 MG: 200 SOLUTION ORAL at 02:07

## 2023-07-23 RX ADMIN — SODIUM CHLORIDE 30 MG/ML INHALATION SOLUTION 4 ML: 30 SOLUTION INHALANT at 08:07

## 2023-07-23 RX ADMIN — OXYBUTYNIN CHLORIDE 5 MG: 5 TABLET ORAL at 08:07

## 2023-07-23 RX ADMIN — CARVEDILOL 25 MG: 12.5 TABLET, FILM COATED ORAL at 08:07

## 2023-07-23 RX ADMIN — GUAIFENESIN 400 MG: 200 SOLUTION ORAL at 10:07

## 2023-07-23 RX ADMIN — SENNOSIDES AND DOCUSATE SODIUM 2 TABLET: 50; 8.6 TABLET ORAL at 09:07

## 2023-07-23 RX ADMIN — OXYCODONE HYDROCHLORIDE AND ACETAMINOPHEN 1 TABLET: 10; 325 TABLET ORAL at 09:07

## 2023-07-23 RX ADMIN — FUROSEMIDE 20 MG: 10 INJECTION, SOLUTION INTRAMUSCULAR; INTRAVENOUS at 08:07

## 2023-07-23 RX ADMIN — PROPOFOL 40 MCG/KG/MIN: 10 INJECTION, EMULSION INTRAVENOUS at 08:07

## 2023-07-23 RX ADMIN — MICONAZOLE NITRATE 2 % TOPICAL POWDER: at 08:07

## 2023-07-23 RX ADMIN — POTASSIUM BICARBONATE 40 MEQ: 391 TABLET, EFFERVESCENT ORAL at 02:07

## 2023-07-23 RX ADMIN — MAGNESIUM SULFATE HEPTAHYDRATE 4 G: 40 INJECTION, SOLUTION INTRAVENOUS at 06:07

## 2023-07-23 RX ADMIN — GUAIFENESIN 400 MG: 200 SOLUTION ORAL at 05:07

## 2023-07-23 RX ADMIN — AMLODIPINE BESYLATE 10 MG: 5 TABLET ORAL at 08:07

## 2023-07-23 RX ADMIN — SENNOSIDES AND DOCUSATE SODIUM 2 TABLET: 50; 8.6 TABLET ORAL at 08:07

## 2023-07-23 RX ADMIN — ENOXAPARIN SODIUM 30 MG: 30 INJECTION SUBCUTANEOUS at 09:07

## 2023-07-23 RX ADMIN — IPRATROPIUM BROMIDE AND ALBUTEROL SULFATE 3 ML: 2.5; .5 SOLUTION RESPIRATORY (INHALATION) at 08:07

## 2023-07-23 RX ADMIN — ATORVASTATIN CALCIUM 20 MG: 10 TABLET, FILM COATED ORAL at 09:07

## 2023-07-23 RX ADMIN — PROPOFOL 40 MCG/KG/MIN: 10 INJECTION, EMULSION INTRAVENOUS at 07:07

## 2023-07-23 RX ADMIN — DOXYCYCLINE HYCLATE 100 MG: 100 TABLET, COATED ORAL at 09:07

## 2023-07-23 RX ADMIN — OXYBUTYNIN CHLORIDE 5 MG: 5 TABLET ORAL at 09:07

## 2023-07-23 RX ADMIN — MICONAZOLE NITRATE 2 % TOPICAL POWDER: at 09:07

## 2023-07-23 RX ADMIN — ENOXAPARIN SODIUM 30 MG: 30 INJECTION SUBCUTANEOUS at 08:07

## 2023-07-23 RX ADMIN — PROPOFOL 40 MCG/KG/MIN: 10 INJECTION, EMULSION INTRAVENOUS at 02:07

## 2023-07-23 RX ADMIN — FAMOTIDINE 20 MG: 10 INJECTION, SOLUTION INTRAVENOUS at 08:07

## 2023-07-23 RX ADMIN — DEXTROSE MONOHYDRATE: 50 INJECTION, SOLUTION INTRAVENOUS at 08:07

## 2023-07-23 RX ADMIN — CARVEDILOL 25 MG: 12.5 TABLET, FILM COATED ORAL at 09:07

## 2023-07-23 RX ADMIN — PROPOFOL 40 MCG/KG/MIN: 10 INJECTION, EMULSION INTRAVENOUS at 04:07

## 2023-07-23 RX ADMIN — CEFTRIAXONE SODIUM 2 G: 2 INJECTION, POWDER, FOR SOLUTION INTRAMUSCULAR; INTRAVENOUS at 12:07

## 2023-07-23 RX ADMIN — PROPOFOL 40 MCG/KG/MIN: 10 INJECTION, EMULSION INTRAVENOUS at 10:07

## 2023-07-23 RX ADMIN — OXYCODONE HYDROCHLORIDE AND ACETAMINOPHEN 1 TABLET: 10; 325 TABLET ORAL at 08:07

## 2023-07-23 RX ADMIN — FENOFIBRATE 48 MG: 48 TABLET, FILM COATED ORAL at 08:07

## 2023-07-23 NOTE — NURSING
Nurses Note -- 4 Eyes      7/23/2023   4:29 PM      Skin assessed during: Daily Assessment      [] No Altered Skin Integrity Present    [x]Prevention Measures Documented      [x] Yes- Altered Skin Integrity Present or Discovered   [] LDA Added if Not in Epic (Describe Wound)   [] New Altered Skin Integrity was Present on Admit and Documented in LDA   [] Wound Image Taken    Wound Care Consulted? Yes    Attending Nurse:  Lazaro Kilgore RN     Second RN/Staff Member:  Neida Silver RN

## 2023-07-23 NOTE — PLAN OF CARE
Problem: Adult Inpatient Plan of Care  Goal: Absence of Hospital-Acquired Illness or Injury  Outcome: Ongoing, Progressing  Intervention: Identify and Manage Fall Risk  Flowsheets (Taken 7/22/2023 2152)  Safety Promotion/Fall Prevention:   bed alarm set   Fall Risk signage in place   muscle strengthening facilitated   pulse ox   side rails raised x 3   room near unit station   /camera at bedside     Problem: Impaired Wound Healing  Goal: Optimal Wound Healing  Outcome: Ongoing, Progressing  Intervention: Promote Wound Healing  Flowsheets (Taken 7/22/2023 2152)  Oral Nutrition Promotion: rest periods promoted  Sleep/Rest Enhancement:   relaxation techniques promoted   noise level reduced  Activity Management: Arm raise - L1  Pain Management Interventions:   biofeedback utilized   relaxation techniques promoted     Problem: Infection  Goal: Absence of Infection Signs and Symptoms  Outcome: Ongoing, Progressing  Intervention: Prevent or Manage Infection  Flowsheets (Taken 7/22/2023 2152)  Fever Reduction/Comfort Measures: lightweight bedding  Infection Management: aseptic technique maintained

## 2023-07-23 NOTE — PROGRESS NOTES
Progress Note  Nephrology    Admit Date: 6/28/2023   LOS: 25 days     SUBJECTIVE:     Follow-up For:  ANTON / ATN    Interval History:  57 Y/O male with history of paraplegia , and suprapubic cath with frequent UTI admitted for Right knee infection and increase bun and creatinin   Pt started on hemodialysis for a few session   Currently pt intubated and on vent , urine out put is good 1700 cc last 24 hrs , still has liquid stool   Creatinin today is better although BUN is higher     Review of Systems:  Constitutional: No fever or chills  Respiratory: No cough or shortness of breath  Cardiovascular: No chest pain or palpitations  Gastrointestinal: No nausea or vomiting , still has liquid stool with rectal tube   Neurological: No confusion or weakness    OBJECTIVE:     Vital Signs Range (Last 24H):  Vitals:    07/23/23 1300   BP: 125/78   Pulse: 96   Resp: 20   Temp:        Temp:  [98.1 °F (36.7 °C)-99.1 °F (37.3 °C)]   Pulse:  []   Resp:  [19-34]   BP: ()/()   SpO2:  [92 %-100 %]     I & O (Last 24H):  Intake/Output Summary (Last 24 hours) at 7/23/2023 1406  Last data filed at 7/23/2023 1000  Gross per 24 hour   Intake 3453 ml   Output 2650 ml   Net 803 ml       Physical Exam:  Sedated , intubated , on vent   Head   normal   Neck   normal   Chest   symmetric   Lungs   clear   Heart   RRR  Abdomen   soft , non tender   Ext    no edema     Laboratory Data:    Recent Labs   Lab 07/23/23  0150   *   K 3.5   CO2 20*   BUN 73.1*   CREATININE 2.29*   GLUCOSE 137*   CALCIUM 8.2*   PHOS 5.2*     Lab Results   Component Value Date    .3 (H) 07/04/2023    CALCIUM 8.2 (L) 07/23/2023    CAION 1.16 07/22/2023    PHOS 5.2 (H) 07/23/2023     Lab Results   Component Value Date    IRON 35 (L) 12/30/2021    TIBC 284 12/30/2021    FERRITIN 24.99 12/30/2021       Medications:  Medication list was reviewed and changes noted under Assessment/Plan.    Diagnostic Results:    Reviewed most recent  CT/US/Echo/MRI    ASSESSMENT/PLAN:   1   ANTON / ATN  2   CKD 4  3   SHPT  4   hypernatremia    5   respiratory failure , on vent   6   paraplegia  7   A fib  8   DM 2  9   Right knee septic joint       Plan     kcl 40 meq though NG tube x 1              Mag sul 4 grams already given               DC D5W   continue with water flushes at 200 every 4 hrs

## 2023-07-23 NOTE — PROGRESS NOTES
Infectious Diseases Progress Note  58-year-old male with past medical history of T-spine cord injury with paraplegia, diabetes, HTN, hepatitis-C, chronic right ankle wound/osteomyelitis, was on suppressive doxycycline, known to my service, seen by us initially at [facility] and has followed with us at the office for chronic osteomyelitis, is admitted to [facility] on 06/28/2023 presenting through the ED with complaints of pain and swelling to his right knee, apparently struck his knee.  He did also report prior remote right knee surgery.  He was evaluated and noted to have no fevers initially but a temperature of 100.2° today 6/29, no leukocytosis but with thrombocytosis of up to 531 today.  .2 and ESR >130.  He is anemic with low albumin.  Blood cultures have been negative.  CT scan of the right knee noted a 5 cm area of subcutaneous fluid collection in the prepatellar region.  There was aspiration in the ER with findings of purulent fluid and cultures showing group B Streptococcus.  He was seen by the orthopedic surgery team with findings of right prepatellar bursa abscess and is status post incision and drainage of right knee septic arthritis and right prepatellar bursa abscess today 6/29 with group B Streptococcus     Subjective:  Remains in ICU. Lying in bed in no acute distress. Re-intubated yesterday followed by bronchoscopy. Afebrile.     Review of Systems   Unable to perform ROS: Intubated       Review of patient's allergies indicates:   Allergen Reactions    Baclofen Itching and Anxiety       Past Medical History:   Diagnosis Date    Arthritis     Chronic ulcer of ankle 05/26/2022    Frequent UTI 07/02/2019    Generalized anxiety disorder 05/26/2022    Neurogenic bladder 05/26/2022    Osteomyelitis 05/26/2022    Presence of suprapubic catheter 05/26/2022    Pure hypercholesterolemia 05/26/2022    Retention of urine, unspecified 08/09/2019    Spinal cord injury  at T1-T6 level 04/20/2018       Past Surgical History:   Procedure Laterality Date    INCISION AND DRAINAGE, LOWER EXTREMITY Right 6/29/2023    Procedure: INCISION AND DRAINAGE, LOWER EXTREMITY;  Surgeon: Prabhu Shen DO;  Location: [facility];  Service: Orthopedics;  Laterality: Right;  supine bone foam wash stuff cultures    INSERTION OF INTRAMEDULLARY MARISA Right 8/10/2022    Procedure: INSERTION, INTRAMEDULLARY MARISA RIGHT TIBIA;  Surgeon: Jorge Orellana MD;  Location: [facility];  Service: Orthopedics;  Laterality: Right;       Social History     Socioeconomic History    Marital status:    Tobacco Use    Smoking status: Every Day     Types: Cigars, Cigarettes    Smokeless tobacco: Never   Substance and Sexual Activity    Alcohol use: Yes     Alcohol/week: 2.0 standard drinks     Types: 2 Cans of beer per week    Drug use: Not Currently    Sexual activity: Never         Scheduled Meds:   albuterol-ipratropium  3 mL Nebulization BID    amLODIPine  10 mg Oral Daily    atorvastatin  20 mg Per NG tube Nightly    carvediloL  25 mg Per NG tube BID    cefTRIAXone (ROCEPHIN) IVPB  2 g Intravenous Q24H    cloNIDine 0.2 mg/24 hr td ptwk  1 patch Transdermal Q7 Days    doxycycline  100 mg Per NG tube Q12H    enoxaparin  30 mg Subcutaneous Q12H    famotidine (PF)  20 mg Intravenous Daily    fenofibrate  48 mg Per NG tube Daily    furosemide (LASIX) injection  20 mg Intravenous Daily    guaiFENesin 100 mg/5 ml  400 mg Per NG tube Q4H    miconazole NITRATE 2 %   Topical (Top) BID    oxybutynin  5 mg Per NG tube BID    senna-docusate 8.6-50 mg  2 tablet Per NG tube BID    sodium chloride 3%  4 mL Nebulization BID    sodium citrate-citric acid 500-334 mg/5 ml  30 mL Oral Once     Continuous Infusions:   clevidipine Stopped (07/21/23 1900)    dexmedeTOMIDine (Precedex) infusion (titrating) 0.2 mcg/kg/hr (07/18/23 2021)    dextrose 5 % (D5W) 1,000 mL (07/22/23 1356)    NORepinephrine bitartrate-D5W Stopped (07/12/23 1039)     "propofoL 40 mcg/kg/min (23)     PRN Meds:sodium chloride, acetaminophen, etomidate, [] fentaNYL **FOLLOWED BY** fentaNYL, fentaNYL, hydrALAZINE, labetalol, oxyCODONE-acetaminophen, rocuronium, sodium chloride 0.9%, sodium chloride 0.9%    Objective:  /79 (BP Location: Right arm, Patient Position: Lying)   Pulse 93   Temp 98.7 °F (37.1 °C) (Oral)   Resp (!) 21   Ht 5' 9.69" (1.77 m)   Wt 86.2 kg (190 lb)   SpO2 98%   BMI 27.51 kg/m²     Physical Exam:   Physical Exam  Vitals reviewed.   HENT:      Head: Normocephalic.   Cardiovascular:      Rate and Rhythm: Normal rate and regular rhythm.   Pulmonary:      Effort: Pulmonary effort is normal. No respiratory distress.      Breath sounds: B/L BS coarse. No wheezing.      Comments: Intubated and on vent  Abdominal:      General: Bowel sounds are normal. There is no distension. L nare NGT     Palpations: Abdomen is soft.      Tenderness: There is no abdominal tenderness.   Genitourinary:     Comments: Suprapubic catheter  Musculoskeletal:      Cervical back: Normal range of motion.      Comments: Paraplegic   Skin:     Findings: No rash.      Comments: Wounds dressed. R knee with wound vac in place   Neurological:      Mental Status: He arouses to verbal stimuli, answers simple questions appropriately. Follows commands.   Psychiatric:         Mood and Affect: Mood normal.         Behavior: Behavior normal.     Imaging  23 CXR  FINDINGS:   Patient remains intubated.  No focal opacification.  Small left effusion remains.    Impression:     As above.  No adverse interval change.      Lab Review   Recent Results (from the past 24 hour(s))   BNP    Collection Time: 23  2:08 AM   Result Value Ref Range    Natriuretic Peptide 255.3 (H) <=100.0 pg/mL   Comprehensive Metabolic Panel    Collection Time: 23  2:08 AM   Result Value Ref Range    Sodium Level 143 136 - 145 mmol/L    Potassium Level 4.1 3.5 - 5.1 mmol/L    Chloride 105 98 " - 107 mmol/L    Carbon Dioxide 25 22 - 29 mmol/L    Glucose Level 119 (H) 74 - 100 mg/dL    Blood Urea Nitrogen 60.5 (H) 8.4 - 25.7 mg/dL    Creatinine 2.62 (H) 0.73 - 1.18 mg/dL    Calcium Level Total 9.1 8.4 - 10.2 mg/dL    Protein Total 7.9 6.4 - 8.3 gm/dL    Albumin Level 2.8 (L) 3.5 - 5.0 g/dL    Globulin 5.1 (H) 2.4 - 3.5 gm/dL    Albumin/Globulin Ratio 0.5 (L) 1.1 - 2.0 ratio    Bilirubin Total 0.5 <=1.5 mg/dL    Alkaline Phosphatase 77 40 - 150 unit/L    Alanine Aminotransferase 16 0 - 55 unit/L    Aspartate Aminotransferase 28 5 - 34 unit/L    eGFR 27 mls/min/1.73/m2   CBC with Differential    Collection Time: 07/22/23  2:08 AM   Result Value Ref Range    WBC 10.93 4.50 - 11.50 x10(3)/mcL    RBC 4.29 (L) 4.70 - 6.10 x10(6)/mcL    Hgb 10.6 (L) 14.0 - 18.0 g/dL    Hct 34.0 (L) 42.0 - 52.0 %    MCV 79.3 (L) 80.0 - 94.0 fL    MCH 24.7 (L) 27.0 - 31.0 pg    MCHC 31.2 (L) 33.0 - 36.0 g/dL    RDW 22.1 (H) 11.5 - 17.0 %    Platelet 693 (H) 130 - 400 x10(3)/mcL    MPV 9.1 7.4 - 10.4 fL    Neut % 55.0 %    Lymph % 30.6 %    Mono % 12.9 %    Eos % 0.3 %    Basophil % 0.5 %    Lymph # 3.34 0.6 - 4.6 x10(3)/mcL    Neut # 6.02 2.1 - 9.2 x10(3)/mcL    Mono # 1.41 (H) 0.1 - 1.3 x10(3)/mcL    Eos # 0.03 0 - 0.9 x10(3)/mcL    Baso # 0.05 <=0.2 x10(3)/mcL    IG# 0.08 (H) 0 - 0.04 x10(3)/mcL    IG% 0.7 %    NRBC% 1.6 %   RT Blood Gas    Collection Time: 07/22/23  4:39 AM   Result Value Ref Range    Sample Type Arterial Blood     Sample site Left Brachial Artery     Drawn by smb,lrt     pH, Blood gas 7.480 (H) 7.350 - 7.450    pCO2, Blood gas 38.0 35.0 - 45.0 mmHg    pO2, Blood gas 78.0 (L) 80.0 - 100.0 mmHg    Sodium, Blood Gas 140 137 - 145 mmol/L    Potassium, Blood Gas 2.8 (L) 3.5 - 5.0 mmol/L    Calcium Level Ionized 1.16 1.12 - 1.23 mmol/L    Base Excess, Blood gas 4.60 (H) -2.00 - 2.00 mmol/L    HCO3, Blood gas 28.3 (H) 22.0 - 26.0 mmol/L    Allens Test Yes     MODE AC     Oxygen Device, Blood gas Ventilator     FIO2,  Blood gas 40 %    Mech Vt 460 ml    Mech RR 22 b/min    PEEP 10.0 cmH2O   Clostridium Diff Toxin, A & B, EIA    Collection Time: 07/22/23  2:59 PM    Specimen: Stool   Result Value Ref Range    Clostridium Difficile GDH Antigen Negative Negative    Clostridium Difficile Toxin A/B Negative Negative       Assessment/Plan:  1. SIRS with fevers  2. Group B Streptococcus Right knee prepatellar abscess  3. Group B Streptococcus Right knee septic arthritis  4.  Anemia/reactive thrombocytosis  5.  Paraplegia secondary to T-spine cord injury  6. History of hepatitis-C   7. Chronic right ankle wound/osteomyelitis  8.  Diabetes    9.  Acute kidney injury  10. Acute hypoxic respiratory failure  11. Pulmonary edema with pleural effusions/ Pneumonitis      -Continue ceftriaxone with end date of 08/09 and maintain chronic suppressive doxycycline, following inflammatory markers  -Afebrile without leukocytosis   -7/4 and and 7/8 blood cultures negative and sputum culture with moderate yeast.    -7/4 CT scan of the abdomen and pelvis and 7/3 chest x-ray results noted, likely dealing with pulmonary edema with pleural effusions and possibly superimposed infectious pneumonitis.   -6/28 right knee abscess culture with Group B Streptococcus  -Seen by Orthopedic surgery team s/p I&D of R prepatellar bursa abscess and septic R knee with cultures yielding Group G Streptococcus  -6/28 blood cultures negative  -Multiple comorbidities, paraplegic with chronic pressure wounds, right ankle osteomyelitis with exposed bone on chronic suppressive doxycycline  -We will continue surgical site care per Orthopedic surgery team  -We will continue wound care  -He is to continue management of his hepatitis-C as outpatient  -Renal impairment noted, HD per Nephrology, started 7/4  -7/3 Renal ultrasound results noted  -Re-intubated on 7/21 followed by Bronchoscopy with mucus plugging noted. Continue vent management and ICU support per Intensivist  -Discussed  with nursing staff.

## 2023-07-23 NOTE — PROGRESS NOTES
[facility] - [facility]  Pulmonary Critical Care Note    Patient Name: Bianca Khan  MRN: [id number]  Admission Date: 6/28/2023  Hospital Length of Stay: 25 days  Code Status: Full Code  Attending Provider: Khris Treadwell Jr., MD, *  Primary Care Provider: Areli Vargas PA-C     Subjective:     HPI:   Bianca Khan is a 58 y.o. male with PMH of paraplegia 2/2 spinal cord injury (T1-T6), neurogenic bladder with suprapubic catheter, chronic right ankle osteomyelitis, DM II, HTN, who presented to the ED on 6/28/2023 with CC of right knee pain. Per chart review, CT of right knee revealed 5 cm area of subcutaneous fluid in prepatellar region which was aspirated in the ED; he was taken to the OR on 6/29/2023 by orthopedic surgery team and was found to have prepatellar bursa infection and septic arthritis. Wound culture 6/29/2023 positive for strep agalactiae. Orthopedic surgery team recommended right-sided above-knee amputation d/t chronically infected hardware in right lower extremity. On 7/4/2023, a RRT was called d/t acute respiratory distress that was not improving despite being placed on vapotherm at 35 L/min with FiO2 100%. At the time of examination, patient's initial GCS was 10 but progressively reduced to a GCS of 6. Shared decision with patient's wife was made to intubate patient for airway protection though ABGs were within normal limits. He was admitted to the ICU for close monitoring and further medical management.    Hospital Course/Significant events:  6/29/2023 - I&D of right knee  7/4/2023 - Admitted to ICU, intubated for airway protection d/t altered mental status. Trialysis catheter placed and HD began  7/18/2023 - Extubated  7/21/23 - Reintubated 2/2 increasing O2 requirements    24 Hour Interval History:  NAEON. Slowly weaning down on ventilation settings. PEEP 8, RR 20, , 40% FiO2. Urine output has increased, total 1700 mL output yesterday. Patient is currently on  sedation- sedation vacation caused agitation, so propofol was continued. C diff toxin and antigen was negative, continues to have decent stool output with rectal tubing. No other issues other wise.        Past Medical History:   Diagnosis Date    Arthritis     Chronic ulcer of ankle 05/26/2022    Frequent UTI 07/02/2019    Generalized anxiety disorder 05/26/2022    Neurogenic bladder 05/26/2022    Osteomyelitis 05/26/2022    Presence of suprapubic catheter 05/26/2022    Pure hypercholesterolemia 05/26/2022    Retention of urine, unspecified 08/09/2019    Spinal cord injury at T1-T6 level 04/20/2018       Past Surgical History:   Procedure Laterality Date    INCISION AND DRAINAGE, LOWER EXTREMITY Right 6/29/2023    Procedure: INCISION AND DRAINAGE, LOWER EXTREMITY;  Surgeon: Prabhu Shen DO;  Location: [facility];  Service: Orthopedics;  Laterality: Right;  supine bone foam wash stuff cultures    INSERTION OF INTRAMEDULLARY MARISA Right 8/10/2022    Procedure: INSERTION, INTRAMEDULLARY MARISA RIGHT TIBIA;  Surgeon: Jorge Orellana MD;  Location: [facility];  Service: Orthopedics;  Laterality: Right;       Social History     Socioeconomic History    Marital status:    Tobacco Use    Smoking status: Every Day     Types: Cigars, Cigarettes    Smokeless tobacco: Never   Substance and Sexual Activity    Alcohol use: Yes     Alcohol/week: 2.0 standard drinks     Types: 2 Cans of beer per week    Drug use: Not Currently    Sexual activity: Never       Current Outpatient Medications   Medication Instructions    amitriptyline (ELAVIL) 50 mg, Oral, Nightly    amLODIPine (NORVASC) 10 MG tablet 1 tablet    atorvastatin (LIPITOR) 20 mg, Oral, Nightly    busPIRone (BUSPAR) 5 MG Tab 1 tablet    carvediloL (COREG) 12.5 mg, Oral    cyclobenzaprine (FLEXERIL) 10 mg, Oral, 2 times daily PRN    doxycycline (VIBRAMYCIN) 100 mg, Oral, Daily    fenofibrate 160 mg, Oral    FLUoxetine 20 mg, Oral    gabapentin (NEURONTIN) 300 MG capsule 1  capsule    lisinopriL (PRINIVIL,ZESTRIL) 10 mg, Oral, Nightly    lisinopriL 10 mg, Oral, Daily    LORazepam (ATIVAN) 1 mg, Oral, 2 times daily    melatonin (MELATIN) 3 mg tablet TAKE TWO TABLETS BY MOUTH EVERY NIGHT AT BEDTIME AS NEEDED FOR INSOMNIA.    meloxicam (MOBIC) 15 mg, Oral, Daily    metFORMIN (GLUCOPHAGE) 500 MG tablet SMARTSI Tablet(s) By Mouth Morning-Evening    oxybutynin (DITROPAN) 5 mg, Oral, 2 times daily    oxyCODONE-acetaminophen (PERCOCET)  mg per tablet 1 tablet, Oral, Every 6 hours PRN    pantoprazole (PROTONIX) 40 MG tablet 1 tablet    temazepam (RESTORIL) 30 mg, Oral, Nightly    traZODone (DESYREL) 50 mg, Oral, Nightly    XARELTO 20 mg Tab SMARTSI Tablet(s) By Mouth Every Evening     Current Inpatient Medications   albuterol-ipratropium  3 mL Nebulization BID    amLODIPine  10 mg Oral Daily    atorvastatin  20 mg Per NG tube Nightly    carvediloL  25 mg Per NG tube BID    cefTRIAXone (ROCEPHIN) IVPB  2 g Intravenous Q24H    cloNIDine 0.2 mg/24 hr td ptwk  1 patch Transdermal Q7 Days    doxycycline  100 mg Per NG tube Q12H    enoxaparin  30 mg Subcutaneous Q12H    famotidine (PF)  20 mg Intravenous Daily    fenofibrate  48 mg Per NG tube Daily    furosemide (LASIX) injection  20 mg Intravenous Daily    guaiFENesin 100 mg/5 ml  400 mg Per NG tube Q4H    magnesium sulfate IVPB  4 g Intravenous Once    miconazole NITRATE 2 %   Topical (Top) BID    oxybutynin  5 mg Per NG tube BID    senna-docusate 8.6-50 mg  2 tablet Per NG tube BID    sodium chloride 3%  4 mL Nebulization BID    sodium citrate-citric acid 500-334 mg/5 ml  30 mL Oral Once     Current Intravenous Infusions   clevidipine Stopped (23 1900)    dexmedeTOMIDine (Precedex) infusion (titrating) 0.2 mcg/kg/hr (23)    dextrose 5 % (D5W) 1,000 mL (23 1356)    NORepinephrine bitartrate-D5W Stopped (23 1039)    propofoL 40 mcg/kg/min (23 0217)     Review of Systems   Unable to perform ROS:  Intubated       Objective:     Intake/Output Summary (Last 24 hours) at 7/23/2023 0532  Last data filed at 7/23/2023 0522  Gross per 24 hour   Intake 6526.8 ml   Output 2720 ml   Net 3806.8 ml       Vital Signs (Most Recent):  Temp: 98.6 °F (37 °C) (07/23/23 0400)  Pulse: 85 (07/23/23 0515)  Resp: 20 (07/23/23 0515)  BP: 129/83 (07/23/23 0515)  SpO2: 100 % (07/23/23 0515)  Body mass index is 27.51 kg/m².  Weight: 86.2 kg (190 lb) Vital Signs (24h Range):  Temp:  [97.7 °F (36.5 °C)-98.7 °F (37.1 °C)] 98.6 °F (37 °C)  Pulse:  [] 85  Resp:  [13-29] 20  SpO2:  [92 %-100 %] 100 %  BP: ()/() 129/83     Physical Exam  Vitals and nursing note reviewed.   Constitutional:       Interventions: He is sedated.   HENT:      Head: Normocephalic and atraumatic.      Nose: Nose normal.      Mouth/Throat:      Comments: ET tube in place  Cardiovascular:      Rate and Rhythm: Normal rate and regular rhythm.      Pulses: Normal pulses.      Heart sounds: Normal heart sounds. No murmur heard.    No friction rub. No gallop.   Pulmonary:      Effort: No respiratory distress.      Breath sounds: No rhonchi.      Comments: Coarse breath sounds bilaterally in all lung fields  Abdominal:      General: Bowel sounds are normal. There is no distension.      Palpations: Abdomen is soft.      Tenderness: There is no abdominal tenderness.   Musculoskeletal:      Cervical back: Neck supple.      Comments: Wound VAC in place to the right knee draining bloody fluid with a total of 300 mL   Skin:     General: Skin is warm and dry.      Capillary Refill: Capillary refill takes less than 2 seconds.   Neurological:      Mental Status: He is alert.      Comments: Intubated, sedated.    Psychiatric:         Mood and Affect: Affect normal.         Behavior: Behavior is cooperative.      Comments:        Lines/Drains/Airways       Drain  Duration                  Suprapubic Catheter 07/06/23 1544 100% silicone 22 Fr. 16 days         NG/OG  Tube 07/21/23 1000 [place] sump 16 Fr. Left nostril 1 day         Rectal Tube 07/22/23 0400 rectal tube w/ balloon (indicate number of mLs) 1 day              Airway  Duration                  Airway - Non-Surgical 07/21/23 1655 1 day              Peripheral Intravenous Line  Duration                  Peripheral IV - Single Lumen 07/04/23 0600 18 G Anterior;Left Forearm 18 days         Midline Catheter Insertion/Assessment  - Single Lumen 07/22/23 1603 Left basilic vein (medial side of arm) other (see comments) <1 day                  Significant Labs:  Lab Results   Component Value Date    WBC 10.32 07/23/2023    HGB 10.0 (L) 07/23/2023    HCT 31.4 (L) 07/23/2023    MCV 78.3 (L) 07/23/2023     07/23/2023       BMP  Lab Results   Component Value Date     (L) 07/23/2023    K 3.5 07/23/2023    CHLORIDE 98 07/23/2023    CO2 20 (L) 07/23/2023    BUN 73.1 (H) 07/23/2023    CREATININE 2.29 (H) 07/23/2023    CALCIUM 8.2 (L) 07/23/2023    AGAP 9.0 08/29/2022    ESTGFRAFRICA 101 05/11/2022    EGFRNONAA >60 04/27/2022     ABG  Recent Labs   Lab 07/22/23  0439   PH 7.480*   PO2 78.0*   HCO3 28.3*       Mechanical Ventilation Support:  Vent Mode: A/C (07/23/23 0400)  Ventilator Initiated: Yes (07/21/23 1655)  Set Rate: 20 BPM (07/23/23 0400)  Vt Set: 450 mL (07/23/23 0400)  Pressure Support: 12 cmH20 (07/18/23 1031)  PEEP/CPAP: 8 cmH20 (07/23/23 0400)  Oxygen Concentration (%): 40 (07/23/23 0400)  Peak Airway Pressure: 19 cmH20 (07/23/23 0400)  Total Ve: 8.9 L/m (07/23/23 0400)  F/VT Ratio<105 (RSBI): (!) 54.89 (07/23/23 0400)    Significant Imaging:  I have reviewed the pertinent imaging within the past 24 hours.    Assessment/Plan:     Assessment  Extensive bilateral pulmonary infiltrates  DX: Infectious pneumonia versus ARDS versus hydrostatic/non hydrostatic pulmonary edema   Sputum culture showed moderate yeast  Intubated 07/04/2023  Extubated 07/18/2023  Prepatellar bursitis/septic arthritis of the right knee  (strep agalactiae)   Post I&D on 06/29/2023   Ortho no longer planning for right above-the-knee amputation in near future due to infectious suppression  Acute renal failure, oliguric  Initiation of hemodialysis on 07/04/2023  Acute encephalopathy, likely metabolic related to above, currently clouded by sedation   Reported paroxysmal atrial fibrillation with permenant pacemaker, currently in sinus rhythm   H/o chronic paraplegia at T1 2/2 spinal cordy injury, neurogenic bladder, chronic right ankle osteomyelitis, DM2, HTN    Plan  -Continue ICU level of care for ongoing monitoring and medical management  -ID, urology, orthopedics, wound care teams following, appreciate their assistance   -HD/Ultrafiltration as per nephrology  -per ID Merrem discontinued and switched to ceftriaxone with end date of 08/09 and continuing suppressive doxycycline with further plan of 6 week course for GBS septic arthritis and following inflammatory markers; repeat cultures (7/9/23) NGTD.  - Bronchoscopy revealed mucus plugging. Due to necessity for prolonged intubation, will likely need tracheostomy, which was approved by the wife  - Urine output increased, no concern at this time  - NG tube placed after being intubated, can restart home antihypertensives  - Continue to replete electrolytes as necessary  - Wife, who is POA, last approved trach and peg. Will discuss with her once more if she would like to continue before procedure is performed.    DVT Prophylaxis: Lovenox 30   GI Prophylaxis: Pepcid 20    Woody Azul DO PGY-II  Pulmonary Critical Care Medicine  [facility] - [facility] ICU

## 2023-07-23 NOTE — NURSING
Nurses Note -- 4 Eyes      7/23/2023   1:37 AM      Skin assessed during: Daily Assessment      [] No Altered Skin Integrity Present    []Prevention Measures Documented      [x] Yes- Altered Skin Integrity Present or Discovered   [] LDA Added if Not in Epic (Describe Wound)   [x] New Altered Skin Integrity was Present on Admit and Documented in LDA   [] Wound Image Taken    Wound Care Consulted? No    Attending Nurse:  Grant Alvraenga RN     Second RN/Staff Member:  YOLIE Ren

## 2023-07-24 LAB
ALBUMIN SERPL-MCNC: 2.4 G/DL (ref 3.5–5)
ALBUMIN/GLOB SERPL: 0.5 RATIO (ref 1.1–2)
ALP SERPL-CCNC: 84 UNIT/L (ref 40–150)
ALT SERPL-CCNC: 17 UNIT/L (ref 0–55)
AST SERPL-CCNC: 16 UNIT/L (ref 5–34)
BILIRUBIN DIRECT+TOT PNL SERPL-MCNC: 0.3 MG/DL
BUN SERPL-MCNC: 86 MG/DL (ref 8.4–25.7)
CALCIUM SERPL-MCNC: 8.6 MG/DL (ref 8.4–10.2)
CHLORIDE SERPL-SCNC: 100 MMOL/L (ref 98–107)
CO2 SERPL-SCNC: 23 MMOL/L (ref 22–29)
CREAT SERPL-MCNC: 2.17 MG/DL (ref 0.73–1.18)
CREAT UR-MCNC: 22.4 MG/DL (ref 63–166)
GFR SERPLBLD CREATININE-BSD FMLA CKD-EPI: 34 MLS/MIN/1.73/M2
GLOBULIN SER-MCNC: 4.4 GM/DL (ref 2.4–3.5)
GLUCOSE SERPL-MCNC: 152 MG/DL (ref 74–100)
POTASSIUM SERPL-SCNC: 3.8 MMOL/L (ref 3.5–5.1)
PROT SERPL-MCNC: 6.8 GM/DL (ref 6.4–8.3)
SODIUM SERPL-SCNC: 135 MMOL/L (ref 136–145)
SODIUM UR-SCNC: <20 MMOL/L

## 2023-07-24 PROCEDURE — 84300 ASSAY OF URINE SODIUM: CPT | Performed by: INTERNAL MEDICINE

## 2023-07-24 PROCEDURE — 20000000 HC ICU ROOM

## 2023-07-24 PROCEDURE — 80053 COMPREHEN METABOLIC PANEL: CPT | Performed by: INTERNAL MEDICINE

## 2023-07-24 PROCEDURE — 27000221 HC OXYGEN, UP TO 24 HOURS

## 2023-07-24 PROCEDURE — 63600175 PHARM REV CODE 636 W HCPCS: Performed by: INTERNAL MEDICINE

## 2023-07-24 PROCEDURE — 99900035 HC TECH TIME PER 15 MIN (STAT)

## 2023-07-24 PROCEDURE — 99900026 HC AIRWAY MAINTENANCE (STAT)

## 2023-07-24 PROCEDURE — 25000003 PHARM REV CODE 250

## 2023-07-24 PROCEDURE — 94003 VENT MGMT INPAT SUBQ DAY: CPT

## 2023-07-24 PROCEDURE — 25000003 PHARM REV CODE 250: Performed by: INTERNAL MEDICINE

## 2023-07-24 PROCEDURE — 82570 ASSAY OF URINE CREATININE: CPT | Performed by: INTERNAL MEDICINE

## 2023-07-24 PROCEDURE — 94640 AIRWAY INHALATION TREATMENT: CPT

## 2023-07-24 PROCEDURE — 25000242 PHARM REV CODE 250 ALT 637 W/ HCPCS: Performed by: INTERNAL MEDICINE

## 2023-07-24 PROCEDURE — 25000003 PHARM REV CODE 250: Performed by: STUDENT IN AN ORGANIZED HEALTH CARE EDUCATION/TRAINING PROGRAM

## 2023-07-24 PROCEDURE — 94761 N-INVAS EAR/PLS OXIMETRY MLT: CPT

## 2023-07-24 RX ORDER — AMLODIPINE BESYLATE 5 MG/1
10 TABLET ORAL DAILY
Status: DISCONTINUED | OUTPATIENT
Start: 2023-07-25 | End: 2023-08-09 | Stop reason: HOSPADM

## 2023-07-24 RX ORDER — SODIUM CHLORIDE 9 MG/ML
INJECTION, SOLUTION INTRAVENOUS CONTINUOUS
Status: DISCONTINUED | OUTPATIENT
Start: 2023-07-24 | End: 2023-07-28

## 2023-07-24 RX ORDER — OXYCODONE AND ACETAMINOPHEN 10; 325 MG/1; MG/1
1 TABLET ORAL EVERY 6 HOURS PRN
Status: DISCONTINUED | OUTPATIENT
Start: 2023-07-24 | End: 2023-08-09 | Stop reason: HOSPADM

## 2023-07-24 RX ADMIN — PROPOFOL 40 MCG/KG/MIN: 10 INJECTION, EMULSION INTRAVENOUS at 04:07

## 2023-07-24 RX ADMIN — DOXYCYCLINE HYCLATE 100 MG: 100 TABLET, COATED ORAL at 08:07

## 2023-07-24 RX ADMIN — IPRATROPIUM BROMIDE AND ALBUTEROL SULFATE 3 ML: 2.5; .5 SOLUTION RESPIRATORY (INHALATION) at 08:07

## 2023-07-24 RX ADMIN — PROPOFOL 40 MCG/KG/MIN: 10 INJECTION, EMULSION INTRAVENOUS at 12:07

## 2023-07-24 RX ADMIN — CARVEDILOL 25 MG: 12.5 TABLET, FILM COATED ORAL at 08:07

## 2023-07-24 RX ADMIN — SODIUM CHLORIDE 30 MG/ML INHALATION SOLUTION 4 ML: 30 SOLUTION INHALANT at 08:07

## 2023-07-24 RX ADMIN — FUROSEMIDE 20 MG: 10 INJECTION, SOLUTION INTRAMUSCULAR; INTRAVENOUS at 08:07

## 2023-07-24 RX ADMIN — SENNOSIDES AND DOCUSATE SODIUM 2 TABLET: 50; 8.6 TABLET ORAL at 08:07

## 2023-07-24 RX ADMIN — GUAIFENESIN 400 MG: 200 SOLUTION ORAL at 10:07

## 2023-07-24 RX ADMIN — MICONAZOLE NITRATE 2 % TOPICAL POWDER: at 08:07

## 2023-07-24 RX ADMIN — PROPOFOL 50 MCG/KG/MIN: 10 INJECTION, EMULSION INTRAVENOUS at 10:07

## 2023-07-24 RX ADMIN — PROPOFOL 40 MCG/KG/MIN: 10 INJECTION, EMULSION INTRAVENOUS at 01:07

## 2023-07-24 RX ADMIN — GUAIFENESIN 400 MG: 200 SOLUTION ORAL at 01:07

## 2023-07-24 RX ADMIN — GUAIFENESIN 400 MG: 200 SOLUTION ORAL at 06:07

## 2023-07-24 RX ADMIN — PROPOFOL 40 MCG/KG/MIN: 10 INJECTION, EMULSION INTRAVENOUS at 06:07

## 2023-07-24 RX ADMIN — ATORVASTATIN CALCIUM 20 MG: 10 TABLET, FILM COATED ORAL at 08:07

## 2023-07-24 RX ADMIN — GUAIFENESIN 400 MG: 200 SOLUTION ORAL at 05:07

## 2023-07-24 RX ADMIN — ENOXAPARIN SODIUM 30 MG: 30 INJECTION SUBCUTANEOUS at 08:07

## 2023-07-24 RX ADMIN — GUAIFENESIN 400 MG: 200 SOLUTION ORAL at 02:07

## 2023-07-24 RX ADMIN — OXYBUTYNIN CHLORIDE 5 MG: 5 TABLET ORAL at 08:07

## 2023-07-24 RX ADMIN — SODIUM CHLORIDE: 9 INJECTION, SOLUTION INTRAVENOUS at 01:07

## 2023-07-24 RX ADMIN — AMLODIPINE BESYLATE 10 MG: 5 TABLET ORAL at 08:07

## 2023-07-24 RX ADMIN — PROPOFOL 40 MCG/KG/MIN: 10 INJECTION, EMULSION INTRAVENOUS at 08:07

## 2023-07-24 RX ADMIN — OXYCODONE AND ACETAMINOPHEN 1 TABLET: 10; 325 TABLET ORAL at 06:07

## 2023-07-24 RX ADMIN — FAMOTIDINE 20 MG: 10 INJECTION, SOLUTION INTRAVENOUS at 08:07

## 2023-07-24 RX ADMIN — FENOFIBRATE 48 MG: 48 TABLET, FILM COATED ORAL at 08:07

## 2023-07-24 NOTE — PROGRESS NOTES
[facility] - [facility] ICU  Pulmonary Critical Care Note    Patient Name: Bianca Khan  MRN: [id number]  Admission Date: 6/28/2023  Hospital Length of Stay: 26 days  Code Status: Full Code  Attending Provider: Khris Treadwell Jr., MD, *  Primary Care Provider: Areli Vargas PA-C     Subjective:     HPI:   Bianca Khan is a 58 y.o. male with PMH of paraplegia 2/2 spinal cord injury (T1-T6), neurogenic bladder with suprapubic catheter, chronic right ankle osteomyelitis, DM II, HTN, who presented to the ED on 6/28/2023 with CC of right knee pain. Per chart review, CT of right knee revealed 5 cm area of subcutaneous fluid in prepatellar region which was aspirated in the ED; he was taken to the OR on 6/29/2023 by orthopedic surgery team and was found to have prepatellar bursa infection and septic arthritis. Wound culture 6/29/2023 positive for strep agalactiae. Orthopedic surgery team recommended right-sided above-knee amputation d/t chronically infected hardware in right lower extremity. On 7/4/2023, a RRT was called d/t acute respiratory distress that was not improving despite being placed on vapotherm at 35 L/min with FiO2 100%. At the time of examination, patient's initial GCS was 10 but progressively reduced to a GCS of 6. Shared decision with patient's wife was made to intubate patient for airway protection though ABGs were within normal limits. He was admitted to the ICU for close monitoring and further medical management.    Hospital Course/Significant events:  6/29/2023 - I&D of right knee  7/4/2023 - Admitted to ICU, intubated for airway protection d/t altered mental status. Trialysis catheter placed and HD began  7/18/2023 - Extubated  7/21/23 - Reintubated 2/2 increasing O2 requirements    24 Hour Interval History:  NAEON. On minimal ventilatory settings at this time.  Urine output of 2L over the last 24 hours.  Patient is currently on sedation- sedation vacation caused agitation, so  propofol was continued.  Planning for trach  tomorrow/Wednesday.  No other issues other wise.        Past Medical History:   Diagnosis Date    Arthritis     Chronic ulcer of ankle 05/26/2022    Frequent UTI 07/02/2019    Generalized anxiety disorder 05/26/2022    Neurogenic bladder 05/26/2022    Osteomyelitis 05/26/2022    Presence of suprapubic catheter 05/26/2022    Pure hypercholesterolemia 05/26/2022    Retention of urine, unspecified 08/09/2019    Spinal cord injury at T1-T6 level 04/20/2018       Past Surgical History:   Procedure Laterality Date    INCISION AND DRAINAGE, LOWER EXTREMITY Right 6/29/2023    Procedure: INCISION AND DRAINAGE, LOWER EXTREMITY;  Surgeon: Prabhu Shen DO;  Location: [facility] OR;  Service: Orthopedics;  Laterality: Right;  supine bone foam wash stuff cultures    INSERTION OF INTRAMEDULLARY MARISA Right 8/10/2022    Procedure: INSERTION, INTRAMEDULLARY MARISA RIGHT TIBIA;  Surgeon: Jorge Orellana MD;  Location: [facility] OR;  Service: Orthopedics;  Laterality: Right;       Social History     Socioeconomic History    Marital status:    Tobacco Use    Smoking status: Every Day     Types: Cigars, Cigarettes    Smokeless tobacco: Never   Substance and Sexual Activity    Alcohol use: Yes     Alcohol/week: 2.0 standard drinks     Types: 2 Cans of beer per week    Drug use: Not Currently    Sexual activity: Never       Current Outpatient Medications   Medication Instructions    amitriptyline (ELAVIL) 50 mg, Oral, Nightly    amLODIPine (NORVASC) 10 MG tablet 1 tablet    atorvastatin (LIPITOR) 20 mg, Oral, Nightly    busPIRone (BUSPAR) 5 MG Tab 1 tablet    carvediloL (COREG) 12.5 mg, Oral    cyclobenzaprine (FLEXERIL) 10 mg, Oral, 2 times daily PRN    doxycycline (VIBRAMYCIN) 100 mg, Oral, Daily    fenofibrate 160 mg, Oral    FLUoxetine 20 mg, Oral    gabapentin (NEURONTIN) 300 MG capsule 1 capsule    lisinopriL (PRINIVIL,ZESTRIL) 10 mg, Oral, Nightly    lisinopriL 10 mg, Oral, Daily    LORazepam  (ATIVAN) 1 mg, Oral, 2 times daily    melatonin (MELATIN) 3 mg tablet TAKE TWO TABLETS BY MOUTH EVERY NIGHT AT BEDTIME AS NEEDED FOR INSOMNIA.    meloxicam (MOBIC) 15 mg, Oral, Daily    metFORMIN (GLUCOPHAGE) 500 MG tablet SMARTSI Tablet(s) By Mouth Morning-Evening    oxybutynin (DITROPAN) 5 mg, Oral, 2 times daily    oxyCODONE-acetaminophen (PERCOCET)  mg per tablet 1 tablet, Oral, Every 6 hours PRN    pantoprazole (PROTONIX) 40 MG tablet 1 tablet    temazepam (RESTORIL) 30 mg, Oral, Nightly    traZODone (DESYREL) 50 mg, Oral, Nightly    XARELTO 20 mg Tab SMARTSI Tablet(s) By Mouth Every Evening     Current Inpatient Medications   albuterol-ipratropium  3 mL Nebulization BID    amLODIPine  10 mg Oral Daily    atorvastatin  20 mg Per NG tube Nightly    carvediloL  25 mg Per NG tube BID    cefTRIAXone (ROCEPHIN) IVPB  2 g Intravenous Q24H    cloNIDine 0.2 mg/24 hr td ptwk  1 patch Transdermal Q7 Days    doxycycline  100 mg Per NG tube Q12H    enoxaparin  30 mg Subcutaneous Q12H    famotidine (PF)  20 mg Intravenous Daily    fenofibrate  48 mg Per NG tube Daily    furosemide (LASIX) injection  20 mg Intravenous Daily    guaiFENesin 100 mg/5 ml  400 mg Per NG tube Q4H    miconazole NITRATE 2 %   Topical (Top) BID    oxybutynin  5 mg Per NG tube BID    senna-docusate 8.6-50 mg  2 tablet Per NG tube BID    sodium chloride 3%  4 mL Nebulization BID    sodium citrate-citric acid 500-334 mg/5 ml  30 mL Oral Once     Current Intravenous Infusions   clevidipine Stopped (23 1900)    dexmedeTOMIDine (Precedex) infusion (titrating) 0.2 mcg/kg/hr (23)    NORepinephrine bitartrate-D5W Stopped (23 1039)    propofoL 40 mcg/kg/min (23 0814)     Review of Systems   Unable to perform ROS: Intubated       Objective:     Intake/Output Summary (Last 24 hours) at 2023 0951  Last data filed at 2023 0600  Gross per 24 hour   Intake 4496 ml   Output 2975 ml   Net 1521 ml       Vital Signs  (Most Recent):  Temp: 98.9 °F (37.2 °C) (07/24/23 0400)  Pulse: 101 (07/24/23 0847)  Resp: (!) 28 (07/24/23 0847)  BP: 111/71 (07/24/23 0600)  SpO2: 97 % (07/24/23 0847)  Body mass index is 27.51 kg/m².  Weight: 86.2 kg (190 lb) Vital Signs (24h Range):  Temp:  [98.4 °F (36.9 °C)-99.2 °F (37.3 °C)] 98.9 °F (37.2 °C)  Pulse:  [] 101  Resp:  [15-29] 28  SpO2:  [91 %-100 %] 97 %  BP: ()/(59-89) 111/71     Physical Exam  Vitals and nursing note reviewed.   Constitutional:       Interventions: He is sedated.   HENT:      Head: Normocephalic and atraumatic.      Nose: Nose normal.      Mouth/Throat:      Comments: ET tube in place  Cardiovascular:      Rate and Rhythm: Normal rate and regular rhythm.      Pulses: Normal pulses.      Heart sounds: Normal heart sounds. No murmur heard.    No friction rub. No gallop.   Pulmonary:      Effort: No respiratory distress.      Breath sounds: No rhonchi.      Comments: Coarse breath sounds bilaterally in all lung fields  Abdominal:      General: Bowel sounds are normal. There is no distension.      Palpations: Abdomen is soft.      Tenderness: There is no abdominal tenderness.   Musculoskeletal:      Cervical back: Neck supple.      Comments: Wound VAC in place to the right knee draining bloody fluid with a total of 300 mL   Skin:     General: Skin is warm and dry.      Capillary Refill: Capillary refill takes less than 2 seconds.   Neurological:      Mental Status: He is alert.      Comments: Intubated, sedated.    Psychiatric:         Mood and Affect: Affect normal.         Behavior: Behavior is cooperative.      Comments:        Lines/Drains/Airways       Drain  Duration                  Suprapubic Catheter 07/06/23 1544 100% silicone 22 Fr. 17 days         NG/OG Tube 07/21/23 1000 [place] sump 16 Fr. Left nostril 2 days         Rectal Tube 07/22/23 0400 rectal tube w/ balloon (indicate number of mLs) 2 days              Airway  Duration                  Airway -  Non-Surgical 07/21/23 1655 2 days              Peripheral Intravenous Line  Duration                  Peripheral IV - Single Lumen 07/04/23 0600 18 G Anterior;Left Forearm 20 days         Midline Catheter Insertion/Assessment  - Single Lumen 07/22/23 1603 Left basilic vein (medial side of arm) other (see comments) 1 day                  Significant Labs:  Lab Results   Component Value Date    WBC 10.32 07/23/2023    HGB 10.0 (L) 07/23/2023    HCT 31.4 (L) 07/23/2023    MCV 78.3 (L) 07/23/2023     07/23/2023       BMP  Lab Results   Component Value Date     (L) 07/24/2023    K 3.8 07/24/2023    CHLORIDE 100 07/24/2023    CO2 23 07/24/2023    BUN 86.0 (H) 07/24/2023    CREATININE 2.17 (H) 07/24/2023    CALCIUM 8.6 07/24/2023    AGAP 9.0 08/29/2022    ESTGFRAFRICA 101 05/11/2022    EGFRNONAA >60 04/27/2022     ABG  Recent Labs   Lab 07/22/23 0439   PH 7.480*   PO2 78.0*   HCO3 28.3*       Mechanical Ventilation Support:  Vent Mode: A/C (07/24/23 0313)  Ventilator Initiated: Yes (07/21/23 1655)  Set Rate: 20 BPM (07/24/23 0313)  Vt Set: 450 mL (07/24/23 0313)  Pressure Support: 12 cmH20 (07/18/23 1031)  PEEP/CPAP: 5 cmH20 (07/24/23 0313)  Oxygen Concentration (%): 30 (07/24/23 0430)  Peak Airway Pressure: 25 cmH20 (07/24/23 0313)  Total Ve: 9.3 L/m (07/24/23 0313)  F/VT Ratio<105 (RSBI): (!) 56.47 (07/24/23 0100)    Significant Imaging:  I have reviewed the pertinent imaging within the past 24 hours.    Assessment/Plan:     Assessment  Extensive bilateral pulmonary infiltrates  DX: Infectious pneumonia versus ARDS versus hydrostatic/non hydrostatic pulmonary edema   Sputum culture showed moderate yeast  Intubated 07/04/2023  Extubated 07/18/2023  Reintubated 07/21/2023 due to increase in O2 requirements   Prepatellar bursitis/septic arthritis of the right knee (strep agalactiae)   Post I&D on 06/29/2023   Ortho no longer planning for right above-the-knee amputation in near future due to infectious  suppression  Acute renal failure, oliguric on CKD 4  Initiation of hemodialysis on 07/04/2023  Acute encephalopathy, likely metabolic related to above  Reported paroxysmal atrial fibrillation with permenant pacemaker, currently in sinus rhythm   H/o chronic paraplegia at T1 2/2 spinal cordy injury, neurogenic bladder, chronic right ankle osteomyelitis, DM2, HTN    Plan  -Continue ICU level of care for ongoing monitoring and medical management  -Continue vent management and sedation with propofol at this time  -ID, urology, orthopedics, wound care teams following, appreciate their assistance   -Continue electrolytes replacement as needed   -per ID Merrem discontinued and switched to ceftriaxone with end date of 08/09 and continuing suppressive doxycycline with further plan of 6 week course for GBS septic arthritis and following inflammatory markers; repeat cultures (7/9/23) NGTD.  - Bronchoscopy revealed mucus plugging. Due to necessity for prolonged intubation, will likely need tracheostomy, which was approved by the wife; Planning for tomorrow/Wednesday   - Urine output increased, no concern at this time; Nephrology on board   - NG tube placed after being intubated,continue TF at 75cc/hr; Continue antihypertensives through NG tube    DVT Prophylaxis: Lovenox 30   GI Prophylaxis: Pepcid 20    Gardenia Mejia DO PGY-II  Pulmonary Critical Care Medicine  [facility] - [facility]

## 2023-07-24 NOTE — NURSING
Nurses Note -- 4 Eyes      7/24/2023   4:52 PM      Skin assessed during: Daily Assessment      [] No Altered Skin Integrity Present    []Prevention Measures Documented      [x] Yes- Altered Skin Integrity Present or Discovered   [] LDA Added if Not in Epic (Describe Wound)   [] New Altered Skin Integrity was Present on Admit and Documented in LDA   [] Wound Image Taken    Wound Care Consulted? Yes    Attending Nurse:  Nick Clark RN     Second RN/Staff Member:  Lindsay Romo RN

## 2023-07-24 NOTE — PROGRESS NOTES
Inpatient Nutrition Assessment    Admit Date: 6/28/2023   Total duration of encounter: 26 days     Nutrition Recommendation/Prescription     Tube feeding recommendation:   Novasource Renal goal rate 45 ml/hr to provide  1800 kcal/d (94% est needs, 121% with meds)  81 g protein/d (62% est needs)  648 ml free water/d   (calculations based on estimated 20 hr/d run time)     Continue with Malachi (provides 90 kcal, 2.5 g protein per serving) BID.    Communication of Recommendations: reviewed with nurse    Nutrition Assessment     Malnutrition Assessment/Nutrition-Focused Physical Exam    Malnutrition Context: chronic illness  Malnutrition Level:  (does not meet criteria)  Energy Intake (Malnutrition):  (unable to obtain)  Weight Loss (Malnutrition):  (unable to obtain)  Subcutaneous Fat (Malnutrition):  (does not meet criteria)           Muscle Mass (Malnutrition):  (does not meet criteria)                          Fluid Accumulation (Malnutrition):  (does not meet criteria)        A minimum of two characteristics is recommended for diagnosis of either severe or non-severe malnutrition.    Chart Review    Reason Seen: continuous nutrition monitoring and follow-up    Malnutrition Screening Tool Results   Have you recently lost weight without trying?: Unsure  Have you been eating poorly because of a decreased appetite?: Yes   MST Score: 3     Diagnosis:  Bilateral Pulmonary Infiltrates   Uremic encephalopathy 2/2 acute renal failure  Acute renal failure  Hyponatremia  Septic arthritis    Relevant Medical History: paraplegia 2/2 spinal cord injury (T1-T6), neurogenic bladder with suprapubic catheter, chronic right ankle osteomyelitis, DM II, HTN    Nutrition-Related Medications:   diprivan, furosemide, senna-docusate    Calorie Containing IV Medications: Diprivan @ 20 ml/hr (provides 530 kcal/d)    Nutrition-Related Labs:  7/5 Na 128, K 5.3, Glu 93, BUN 39.8, Crea 7.07, Phos 7.1, GFR 8  7/11 Na 133, BUN 75.8, Crea 4.3, Glu  121, Phos 6.5  7/14 .2, Crea 4.09, Glu 118, Phos 5.9  7/18 BUN 59.5, Crea 2.34, Glu 119, Phos 5.3  7/20 Na 148, BUN 71.2, Crea 2.4, Glu 146  7/24 BUN 86, Crea 2.17, Glu 152, Phos 5.2    Diet/PN Order: No diet orders on file  Oral Supplement Order: none  Tube Feeding Order:  Impact Peptide 1.5 (see below for calculation)  Appetite/Oral Intake: not applicable/not applicable  Factors Affecting Nutritional Intake: respiratory status  Food/Jain/Cultural Preferences: unable to obtain  Food Allergies: none reported    Skin Integrity: wound, drain/device(s)  Wound(s):      Altered Skin Integrity 06/28/23 2230 medial Sacral spine #1-Tissue loss description: Partial thickness       Altered Skin Integrity 06/28/23 2230 Left posterior Buttocks #4-Tissue loss description: Partial thickness       Altered Skin Integrity 06/28/23 2230 Right lateral Ankle #6-Tissue loss description: Full thickness     Comments    7/5/23: Discussed with RN. Will provide tube feeding recommendations for when appropriate to start tube feeding. Receiving kcal from meds. Noted K and Phos, will need renal formula at this time. Will add supplemental protein and Malachi for wound healing.     7/11/23: TF continues, tolerated @ goal rate.     7/14/23: TF continues, tolerated per RN.     7/18/23: TF continues, tolerated per RN. Possible plans for vent weaning today. Still receiving kcal from meds. Noted increase in diprivan. If remains at increased rate, will adjust TF rate to not overfeed.    7/20/23: Tf no longer running. NG pulled out by pt. Now on BIPAP and not able to replace tube. Discussed with RN, can wait a few days, but will eventually need to consider reinserting NG vs. PN if not able to insert NG.     7/24/23: Pt now reintubated. NG in place. TF running. Now overfeeding pt and Phos elevated. Will continue with Malachi but change to renal formula. Plans for trach this week per RN. Noted updated TF recs will not meet est protein needs. Will  "continue at this time and hopefully once trach placed can increase TF rate since diprivan hopefully weaned.    Anthropometrics    Height: 5' 9.69" (177 cm) Height Method: Stated  Last Weight: 86.2 kg (190 lb) (23 1114) Weight Method: Standard Scale (stated)  BMI (Calculated): 27.5  BMI Classification: overweight (BMI 25-29.9)        Ideal Body Weight (IBW), Male: 164.14 lb     % Ideal Body Weight, Male (lb): 115.75 %                          Usual Weight Provided By: unable to obtain usual weight    Wt Readings from Last 5 Encounters:   23 86.2 kg (190 lb)   22 86.2 kg (190 lb)   22 86.2 kg (190 lb 0.6 oz)   22 86.2 kg (189 lb 15.9 oz)     Weight Change(s) Since Admission:  Admit Weight: 86.2 kg (190 lb) (23 1346)  23: no new  23: no new  23: no new  23: no new    Estimated Needs    Weight Used For Calorie Calculations: 86.2 kg (190 lb 0.6 oz)  Energy Calorie Requirements (kcal): 1922kcal  Energy Need Method: [place]  Weight Used For Protein Calculations: 86.2 kg (190 lb 0.6 oz)  Protein Requirements: 130gm (1.5g/kg)  Fluid Requirements (mL): 1000ml + urinary output  Temp (24hrs), Av.9 °F (37.2 °C), Min:98.4 °F (36.9 °C), Max:99.2 °F (37.3 °C)    Vtot (L/Min) for [place] Equation Calculation: 9.3    Enteral Nutrition    Formula: Impact Peptide 1.5 Arnol  Rate/Volume: 70ml/hr  Water Flushes: 50ml/hr q4hr  Additives/Modulars: Malachi  Route: nasogastric tube  Method: continuous  Total Nutrition Provided by Tube Feeding, Additives, and Flushes:  Calories Provided  2100 kcal/d, 96% needs   Protein Provided  131 g/d, 101% needs   Fluid Provided  1078 ml/d, N/A% needs   Continuous feeding calculations based on estimated 20 hr/d run time unless otherwise stated.     Parenteral Nutrition    Patient not receiving parenteral nutrition support at this time.    Evaluation of Received Nutrient Intake    Calories: exceeding estimated needs  Protein: exceeding estimated " needs    Patient Education    Not applicable.    Nutrition Diagnosis     PES: Inadequate oral intake related to acute illness as evidenced by NPO post extubation. (continues)    Interventions/Goals     Intervention(s): modified composition of enteral nutrition, modified rate of enteral nutrition, and collaboration with other providers  Goal: Meet greater than 75% of nutritional needs by follow-up. (goal progressing)    Monitoring & Evaluation     Dietitian will monitor energy intake.  Nutrition Risk/Follow-Up: high (follow-up in 1-4 days)   Please consult if re-assessment needed sooner.

## 2023-07-24 NOTE — PROGRESS NOTES
Progress Note  Nephrology    Admit Date: 6/28/2023   LOS: 26 days     SUBJECTIVE:     Follow-up For:  ANTON / ATN    Interval History:  57 Y/O male with history of Paraplegia , and chronic suprapubic catheter with frequent infection admitted to hospital for Right knee infection , pt had ANTON / CKD and had a few dialyisis session done   Creatinin improved but BUN is increasing    Intubated and on vent   Nurse reports no new problem     Review of Systems:  Constitutional: No fever or chills  Respiratory: No cough or shortness of breath  Cardiovascular: No chest pain or palpitations  Gastrointestinal: No nausea or vomiting  Neurological: No confusion or weakness    OBJECTIVE:     Vital Signs Range (Last 24H):  Vitals:    07/24/23 1200   BP: 123/82   Pulse: 85   Resp: 20   Temp: 98.7 °F (37.1 °C)       Temp:  [98.2 °F (36.8 °C)-99.2 °F (37.3 °C)]   Pulse:  []   Resp:  [15-29]   BP: ()/(59-84)   SpO2:  [91 %-100 %]     I & O (Last 24H):  Intake/Output Summary (Last 24 hours) at 7/24/2023 1253  Last data filed at 7/24/2023 1200  Gross per 24 hour   Intake 4496 ml   Output 3075 ml   Net 1421 ml       Physical Exam:  Alert , oriented , in NAD  Head   normal   Neck   supple   Chest   symmetric   Lungs   clear   Heart   RRR  Abdomen   soft , non tender   Ext   no edema   Urine output 2725 cc   has liquid stool too   Intake 4496 cc   Laboratory Data:    Recent Labs   Lab 07/23/23  0150 07/24/23  0315   * 135*   K 3.5 3.8   CO2 20* 23   BUN 73.1* 86.0*   CREATININE 2.29* 2.17*   GLUCOSE 137* 152*   CALCIUM 8.2* 8.6   PHOS 5.2*  --      Lab Results   Component Value Date    .3 (H) 07/04/2023    CALCIUM 8.6 07/24/2023    CAION 1.16 07/22/2023    PHOS 5.2 (H) 07/23/2023     Lab Results   Component Value Date    IRON 35 (L) 12/30/2021    TIBC 284 12/30/2021    FERRITIN 24.99 12/30/2021       Medications:  Medication list was reviewed and changes noted under Assessment/Plan.    Diagnostic Results:    Reviewed  most recent CT/US/Echo/MRI    ASSESSMENT/PLAN:   1   ANTON / ATN  2   CKD4  3   Pre Renal , doxycycline is also can cause high BUN and pre renal   4   SHPT  5   hypernatremia  6   paraplegia  7   A Fib  8   DM2  9   Respiratory failure , on vent   10  Right knee septic joint     Plan   stool for ocult blood             Urine spot sodium             BNP

## 2023-07-25 LAB
ALBUMIN SERPL-MCNC: 2.2 G/DL (ref 3.5–5)
BASOPHILS # BLD AUTO: 0.04 X10(3)/MCL
BASOPHILS NFR BLD AUTO: 0.4 %
BNP BLD-MCNC: 77.6 PG/ML
BUN SERPL-MCNC: 85.6 MG/DL (ref 8.4–25.7)
CALCIUM SERPL-MCNC: 8.9 MG/DL (ref 8.4–10.2)
CHLORIDE SERPL-SCNC: 103 MMOL/L (ref 98–107)
CO2 SERPL-SCNC: 22 MMOL/L (ref 22–29)
CREAT SERPL-MCNC: 1.7 MG/DL (ref 0.73–1.18)
EOSINOPHIL # BLD AUTO: 0.25 X10(3)/MCL (ref 0–0.9)
EOSINOPHIL NFR BLD AUTO: 2.6 %
ERYTHROCYTE [DISTWIDTH] IN BLOOD BY AUTOMATED COUNT: 23.2 % (ref 11.5–17)
GFR SERPLBLD CREATININE-BSD FMLA CKD-EPI: 46 MLS/MIN/1.73/M2
GLUCOSE SERPL-MCNC: 140 MG/DL (ref 74–100)
HCT VFR BLD AUTO: 31.5 % (ref 42–52)
HGB BLD-MCNC: 9.9 G/DL (ref 14–18)
IMM GRANULOCYTES # BLD AUTO: 0.12 X10(3)/MCL (ref 0–0.04)
IMM GRANULOCYTES NFR BLD AUTO: 1.3 %
LYMPHOCYTES # BLD AUTO: 2.73 X10(3)/MCL (ref 0.6–4.6)
LYMPHOCYTES NFR BLD AUTO: 28.5 %
MCH RBC QN AUTO: 24.4 PG (ref 27–31)
MCHC RBC AUTO-ENTMCNC: 31.4 G/DL (ref 33–36)
MCV RBC AUTO: 77.8 FL (ref 80–94)
MONOCYTES # BLD AUTO: 1.09 X10(3)/MCL (ref 0.1–1.3)
MONOCYTES NFR BLD AUTO: 11.4 %
NEUTROPHILS # BLD AUTO: 5.35 X10(3)/MCL (ref 2.1–9.2)
NEUTROPHILS NFR BLD AUTO: 55.8 %
NRBC BLD AUTO-RTO: 0.2 %
PHOSPHATE SERPL-MCNC: 3.6 MG/DL (ref 2.3–4.7)
PLATELET # BLD AUTO: 553 X10(3)/MCL (ref 130–400)
PMV BLD AUTO: 9.6 FL (ref 7.4–10.4)
POTASSIUM SERPL-SCNC: 3.6 MMOL/L (ref 3.5–5.1)
RBC # BLD AUTO: 4.05 X10(6)/MCL (ref 4.7–6.1)
SODIUM SERPL-SCNC: 137 MMOL/L (ref 136–145)
WBC # SPEC AUTO: 9.58 X10(3)/MCL (ref 4.5–11.5)

## 2023-07-25 PROCEDURE — 85025 COMPLETE CBC W/AUTO DIFF WBC: CPT

## 2023-07-25 PROCEDURE — 63600175 PHARM REV CODE 636 W HCPCS

## 2023-07-25 PROCEDURE — 63600175 PHARM REV CODE 636 W HCPCS: Performed by: INTERNAL MEDICINE

## 2023-07-25 PROCEDURE — 97605 NEG PRS WND THER DME<=50SQCM: CPT

## 2023-07-25 PROCEDURE — 80069 RENAL FUNCTION PANEL: CPT | Performed by: INTERNAL MEDICINE

## 2023-07-25 PROCEDURE — 25000003 PHARM REV CODE 250: Performed by: INTERNAL MEDICINE

## 2023-07-25 PROCEDURE — 94640 AIRWAY INHALATION TREATMENT: CPT

## 2023-07-25 PROCEDURE — 20000000 HC ICU ROOM

## 2023-07-25 PROCEDURE — 99900035 HC TECH TIME PER 15 MIN (STAT)

## 2023-07-25 PROCEDURE — 94761 N-INVAS EAR/PLS OXIMETRY MLT: CPT

## 2023-07-25 PROCEDURE — 25000242 PHARM REV CODE 250 ALT 637 W/ HCPCS: Performed by: INTERNAL MEDICINE

## 2023-07-25 PROCEDURE — 99900026 HC AIRWAY MAINTENANCE (STAT)

## 2023-07-25 PROCEDURE — 83880 ASSAY OF NATRIURETIC PEPTIDE: CPT | Performed by: INTERNAL MEDICINE

## 2023-07-25 PROCEDURE — 94003 VENT MGMT INPAT SUBQ DAY: CPT

## 2023-07-25 PROCEDURE — 99900031 HC PATIENT EDUCATION (STAT)

## 2023-07-25 PROCEDURE — 25000003 PHARM REV CODE 250: Performed by: STUDENT IN AN ORGANIZED HEALTH CARE EDUCATION/TRAINING PROGRAM

## 2023-07-25 PROCEDURE — 27100171 HC OXYGEN HIGH FLOW UP TO 24 HOURS

## 2023-07-25 PROCEDURE — 25000003 PHARM REV CODE 250

## 2023-07-25 RX ORDER — POTASSIUM CHLORIDE 14.9 MG/ML
40 INJECTION INTRAVENOUS ONCE
Status: COMPLETED | OUTPATIENT
Start: 2023-07-25 | End: 2023-07-25

## 2023-07-25 RX ADMIN — PROPOFOL 50 MCG/KG/MIN: 10 INJECTION, EMULSION INTRAVENOUS at 04:07

## 2023-07-25 RX ADMIN — DOXYCYCLINE HYCLATE 100 MG: 100 TABLET, COATED ORAL at 08:07

## 2023-07-25 RX ADMIN — ENOXAPARIN SODIUM 30 MG: 30 INJECTION SUBCUTANEOUS at 08:07

## 2023-07-25 RX ADMIN — AMLODIPINE BESYLATE 10 MG: 5 TABLET ORAL at 08:07

## 2023-07-25 RX ADMIN — GUAIFENESIN 400 MG: 200 SOLUTION ORAL at 05:07

## 2023-07-25 RX ADMIN — MICONAZOLE NITRATE 2 % TOPICAL POWDER: at 08:07

## 2023-07-25 RX ADMIN — FENTANYL CITRATE 50 MCG: 50 INJECTION, SOLUTION INTRAMUSCULAR; INTRAVENOUS at 08:07

## 2023-07-25 RX ADMIN — POTASSIUM CHLORIDE 40 MEQ: 14.9 INJECTION, SOLUTION INTRAVENOUS at 04:07

## 2023-07-25 RX ADMIN — CARVEDILOL 25 MG: 12.5 TABLET, FILM COATED ORAL at 08:07

## 2023-07-25 RX ADMIN — GUAIFENESIN 400 MG: 200 SOLUTION ORAL at 02:07

## 2023-07-25 RX ADMIN — PROPOFOL 50 MCG/KG/MIN: 10 INJECTION, EMULSION INTRAVENOUS at 09:07

## 2023-07-25 RX ADMIN — SODIUM CHLORIDE: 9 INJECTION, SOLUTION INTRAVENOUS at 07:07

## 2023-07-25 RX ADMIN — CEFTRIAXONE SODIUM 2 G: 2 INJECTION, POWDER, FOR SOLUTION INTRAMUSCULAR; INTRAVENOUS at 11:07

## 2023-07-25 RX ADMIN — PROPOFOL 50 MCG/KG/MIN: 10 INJECTION, EMULSION INTRAVENOUS at 11:07

## 2023-07-25 RX ADMIN — PROPOFOL 50 MCG/KG/MIN: 10 INJECTION, EMULSION INTRAVENOUS at 05:07

## 2023-07-25 RX ADMIN — FAMOTIDINE 20 MG: 10 INJECTION, SOLUTION INTRAVENOUS at 08:07

## 2023-07-25 RX ADMIN — OXYBUTYNIN CHLORIDE 5 MG: 5 TABLET ORAL at 08:07

## 2023-07-25 RX ADMIN — GUAIFENESIN 400 MG: 200 SOLUTION ORAL at 09:07

## 2023-07-25 RX ADMIN — CEFTRIAXONE SODIUM 2 G: 2 INJECTION, POWDER, FOR SOLUTION INTRAMUSCULAR; INTRAVENOUS at 12:07

## 2023-07-25 RX ADMIN — PROPOFOL 50 MCG/KG/MIN: 10 INJECTION, EMULSION INTRAVENOUS at 08:07

## 2023-07-25 RX ADMIN — FENOFIBRATE 48 MG: 48 TABLET, FILM COATED ORAL at 08:07

## 2023-07-25 RX ADMIN — IPRATROPIUM BROMIDE AND ALBUTEROL SULFATE 3 ML: 2.5; .5 SOLUTION RESPIRATORY (INHALATION) at 08:07

## 2023-07-25 RX ADMIN — SODIUM CHLORIDE: 9 INJECTION, SOLUTION INTRAVENOUS at 09:07

## 2023-07-25 RX ADMIN — SODIUM CHLORIDE 30 MG/ML INHALATION SOLUTION 4 ML: 30 SOLUTION INHALANT at 07:07

## 2023-07-25 RX ADMIN — ATORVASTATIN CALCIUM 20 MG: 10 TABLET, FILM COATED ORAL at 08:07

## 2023-07-25 RX ADMIN — SENNOSIDES AND DOCUSATE SODIUM 2 TABLET: 50; 8.6 TABLET ORAL at 08:07

## 2023-07-25 RX ADMIN — GUAIFENESIN 400 MG: 200 SOLUTION ORAL at 06:07
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
## 2023-07-25 NOTE — NURSING
Nurses Note -- 4 Eyes      7/25/2023   3:39 AM      Skin assessed during: Q Shift Change      [] No Altered Skin Integrity Present    [x]Prevention Measures Documented      [x] Yes- Altered Skin Integrity Present or Discovered   [] LDA Added if Not in Epic (Describe Wound)   [] New Altered Skin Integrity was Present on Admit and Documented in LDA   [] Wound Image Taken    Wound Care Consulted? Yes    Attending Nurse:  Woody Davis RN     Second RN/Staff Member:  YOLIE Boykin

## 2023-07-25 NOTE — PROGRESS NOTES
Progress Note  Nephrology    Admit Date: 6/28/2023   LOS: 27 days     SUBJECTIVE:     Follow-up For:  ANTON / ATN    Interval History:  59 Y/O male with history of paraplegia , suprapubic cath with frequent infection , admitted to hospital for Right Knee infection and antibiotics   Darain place in left knee due to septic joint   Pt also had ANTON on CKD and had a few dialysis session   Currently had to be intubated again and is on vent   No edema or fluid overload noted   Today BNP is 77.6 and urine spot sodium is <20  Bum is still high at 85 and cr is 1.7     Review of Systems:  Constitutional: No fever or chills  Respiratory: No cough or shortness of breath  Cardiovascular: No chest pain or palpitations  Gastrointestinal: No nausea or vomiting  Neurological: No confusion or weakness  Still has rectal tube with liquid stool    OBJECTIVE:     Vital Signs Range (Last 24H):  Vitals:    07/25/23 1051   BP:    Pulse: 84   Resp:    Temp:        Temp:  [98.4 °F (36.9 °C)-99.4 °F (37.4 °C)]   Pulse:  []   Resp:  [1-32]   BP: (114-153)/(65-92)   SpO2:  [81 %-100 %]     I & O (Last 24H):  Intake/Output Summary (Last 24 hours) at 7/25/2023 1352  Last data filed at 7/25/2023 0600  Gross per 24 hour   Intake 3224.6 ml   Output 2250 ml   Net 974.6 ml       Physical Exam:  On vent and intubated   Head   normal   Neck   supple   Chest   symmetric   Lungs   clear   Heart   RRR  Abdomen   soft , non tender   Ext   no edema    Laboratory Data:    Recent Labs   Lab 07/25/23  0205      K 3.6   CO2 22   BUN 85.6*   CREATININE 1.70*   GLUCOSE 140*   CALCIUM 8.9   PHOS 3.6     Lab Results   Component Value Date    .3 (H) 07/04/2023    CALCIUM 8.9 07/25/2023    CAION 1.16 07/22/2023    PHOS 3.6 07/25/2023     Lab Results   Component Value Date    IRON 35 (L) 12/30/2021    TIBC 284 12/30/2021    FERRITIN 24.99 12/30/2021       Medications:  Medication list was reviewed and changes noted under Assessment/Plan.    Diagnostic  Results:    Reviewed most recent CT/US/Echo/MRI    ASSESSMENT/PLAN:   1   ANTON / ATN  2   Pre Renal    intravascular depletion   3   CKD 4  4   SHPT  5   hypernatremia     6   paraplegia  7   A Fib  8   DM 2  9   Respiratory failure , on vent   10  Right knee septic joint  drain in   11   moderate protein calorie malnutrition     Plan     normal saline 75 cc per hr               Kcl 40 meq IVPB

## 2023-07-25 NOTE — PROGRESS NOTES
[facility] - [facility]  Pulmonary Critical Care Note    Patient Name: Bianca Khan  MRN: [id number]  Admission Date: 6/28/2023  Hospital Length of Stay: 27 days  Code Status: Full Code  Attending Provider: Khris Treadwell Jr., MD, *  Primary Care Provider: Areli Vargas PA-C     Subjective:     HPI:   Bianca Khan is a 58 y.o. male with PMH of paraplegia 2/2 spinal cord injury (T1-T6), neurogenic bladder with suprapubic catheter, chronic right ankle osteomyelitis, DM II, HTN, who presented to the ED on 6/28/2023 with CC of right knee pain. Per chart review, CT of right knee revealed 5 cm area of subcutaneous fluid in prepatellar region which was aspirated in the ED; he was taken to the OR on 6/29/2023 by orthopedic surgery team and was found to have prepatellar bursa infection and septic arthritis. Wound culture 6/29/2023 positive for strep agalactiae. Orthopedic surgery team recommended right-sided above-knee amputation d/t chronically infected hardware in right lower extremity. On 7/4/2023, a RRT was called d/t acute respiratory distress that was not improving despite being placed on vapotherm at 35 L/min with FiO2 100%. At the time of examination, patient's initial GCS was 10 but progressively reduced to a GCS of 6. Shared decision with patient's wife was made to intubate patient for airway protection though ABGs were within normal limits. He was admitted to the ICU for close monitoring and further medical management.    Hospital Course/Significant events:  6/29/2023 - I&D of right knee  7/4/2023 - Admitted to ICU, intubated for airway protection d/t altered mental status. Trialysis catheter placed and HD began  7/18/2023 - Extubated  7/21/23 - Reintubated 2/2 increasing O2 requirements    24 Hour Interval History:  NAEON.  Patient desatted overnight and oxygen requirements increased for patient as high as FiO2 of 80%.  Slowly weaning oxygen down patient is currently on 40% FiO2 right now.   Net -200 cc.  Currently 50 mcg/kg of propofol.  Possibly tracheostomy today or tomorrow.    Past Medical History:   Diagnosis Date    Arthritis     Chronic ulcer of ankle 05/26/2022    Frequent UTI 07/02/2019    Generalized anxiety disorder 05/26/2022    Neurogenic bladder 05/26/2022    Osteomyelitis 05/26/2022    Presence of suprapubic catheter 05/26/2022    Pure hypercholesterolemia 05/26/2022    Retention of urine, unspecified 08/09/2019    Spinal cord injury at T1-T6 level 04/20/2018       Past Surgical History:   Procedure Laterality Date    INCISION AND DRAINAGE, LOWER EXTREMITY Right 6/29/2023    Procedure: INCISION AND DRAINAGE, LOWER EXTREMITY;  Surgeon: Prabhu Shen DO;  Location: [facility] OR;  Service: Orthopedics;  Laterality: Right;  supine bone foam wash stuff cultures    INSERTION OF INTRAMEDULLARY MARISA Right 8/10/2022    Procedure: INSERTION, INTRAMEDULLARY MARISA RIGHT TIBIA;  Surgeon: Jorge Orellana MD;  Location: [facility] OR;  Service: Orthopedics;  Laterality: Right;       Social History     Socioeconomic History    Marital status:    Tobacco Use    Smoking status: Every Day     Types: Cigars, Cigarettes    Smokeless tobacco: Never   Substance and Sexual Activity    Alcohol use: Yes     Alcohol/week: 2.0 standard drinks     Types: 2 Cans of beer per week    Drug use: Not Currently    Sexual activity: Never       Current Outpatient Medications   Medication Instructions    amitriptyline (ELAVIL) 50 mg, Oral, Nightly    amLODIPine (NORVASC) 10 MG tablet 1 tablet    atorvastatin (LIPITOR) 20 mg, Oral, Nightly    busPIRone (BUSPAR) 5 MG Tab 1 tablet    carvediloL (COREG) 12.5 mg, Oral    cyclobenzaprine (FLEXERIL) 10 mg, Oral, 2 times daily PRN    doxycycline (VIBRAMYCIN) 100 mg, Oral, Daily    fenofibrate 160 mg, Oral    FLUoxetine 20 mg, Oral    gabapentin (NEURONTIN) 300 MG capsule 1 capsule    lisinopriL (PRINIVIL,ZESTRIL) 10 mg, Oral, Nightly    lisinopriL 10 mg, Oral, Daily    LORazepam (ATIVAN) 1  mg, Oral, 2 times daily    melatonin (MELATIN) 3 mg tablet TAKE TWO TABLETS BY MOUTH EVERY NIGHT AT BEDTIME AS NEEDED FOR INSOMNIA.    meloxicam (MOBIC) 15 mg, Oral, Daily    metFORMIN (GLUCOPHAGE) 500 MG tablet SMARTSI Tablet(s) By Mouth Morning-Evening    oxybutynin (DITROPAN) 5 mg, Oral, 2 times daily    oxyCODONE-acetaminophen (PERCOCET)  mg per tablet 1 tablet, Oral, Every 6 hours PRN    pantoprazole (PROTONIX) 40 MG tablet 1 tablet    temazepam (RESTORIL) 30 mg, Oral, Nightly    traZODone (DESYREL) 50 mg, Oral, Nightly    XARELTO 20 mg Tab SMARTSI Tablet(s) By Mouth Every Evening     Current Inpatient Medications   albuterol-ipratropium  3 mL Nebulization BID    amLODIPine  10 mg Per NG tube Daily    atorvastatin  20 mg Per NG tube Nightly    carvediloL  25 mg Per NG tube BID    cefTRIAXone (ROCEPHIN) IVPB  2 g Intravenous Q24H    cloNIDine 0.2 mg/24 hr td ptwk  1 patch Transdermal Q7 Days    doxycycline  100 mg Per NG tube Q12H    enoxaparin  30 mg Subcutaneous Q12H    famotidine (PF)  20 mg Intravenous Daily    fenofibrate  48 mg Per NG tube Daily    guaiFENesin 100 mg/5 ml  400 mg Per NG tube Q4H    miconazole NITRATE 2 %   Topical (Top) BID    oxybutynin  5 mg Per NG tube BID    senna-docusate 8.6-50 mg  2 tablet Per NG tube BID    sodium chloride 3%  4 mL Nebulization BID    sodium citrate-citric acid 500-334 mg/5 ml  30 mL Oral Once     Current Intravenous Infusions   sodium chloride 0.9% 50 mL/hr at 23 1352    clevidipine Stopped (23 1900)    dexmedeTOMIDine (Precedex) infusion (titrating) 0.2 mcg/kg/hr (23)    NORepinephrine bitartrate-D5W Stopped (23 1039)    propofoL 50 mcg/kg/min (23)     Review of Systems   Unable to perform ROS: Intubated       Objective:     Intake/Output Summary (Last 24 hours) at 2023 0410  Last data filed at 2023 0200  Gross per 24 hour   Intake 3556.4 ml   Output 3375 ml   Net 181.4 ml       Vital Signs (Most  Recent):  Temp: 98.4 °F (36.9 °C) (07/25/23 0000)  Pulse: 82 (07/25/23 0330)  Resp: (!) 21 (07/25/23 0330)  BP: 114/65 (07/25/23 0300)  SpO2: 99 % (07/25/23 0330)  Body mass index is 27.51 kg/m².  Weight: 86.2 kg (190 lb) Vital Signs (24h Range):  Temp:  [98.2 °F (36.8 °C)-99.4 °F (37.4 °C)] 98.4 °F (36.9 °C)  Pulse:  [] 82  Resp:  [1-32] 21  SpO2:  [81 %-100 %] 99 %  BP: (103-142)/(65-88) 114/65     Physical Exam  Vitals and nursing note reviewed.   Constitutional:       Interventions: He is sedated.   HENT:      Head: Normocephalic and atraumatic.      Nose: Nose normal.      Mouth/Throat:      Comments: ET tube in place  Cardiovascular:      Rate and Rhythm: Normal rate and regular rhythm.      Pulses: Normal pulses.      Heart sounds: Normal heart sounds. No murmur heard.    No friction rub. No gallop.   Pulmonary:      Effort: No respiratory distress.      Breath sounds: No rhonchi.      Comments: Coarse breath sounds bilaterally in all lung fields  Abdominal:      General: Bowel sounds are normal. There is no distension.      Palpations: Abdomen is soft.      Tenderness: There is no abdominal tenderness.   Musculoskeletal:      Cervical back: Neck supple.      Comments: Wound VAC in place to the right knee draining bloody fluid with a total of 300 mL   Skin:     General: Skin is warm and dry.      Capillary Refill: Capillary refill takes less than 2 seconds.   Neurological:      Mental Status: He is alert.      Comments: Intubated, sedated.    Psychiatric:         Mood and Affect: Affect normal.         Behavior: Behavior is cooperative.      Comments:        Lines/Drains/Airways       Drain  Duration                  Suprapubic Catheter 07/06/23 1544 100% silicone 22 Fr. 18 days         NG/OG Tube 07/21/23 1000 [place] sump 16 Fr. Left nostril 3 days         Rectal Tube 07/22/23 0400 rectal tube w/ balloon (indicate number of mLs) 3 days              Airway  Duration                  Airway - Non-Surgical  07/21/23 1655 3 days              Peripheral Intravenous Line  Duration                  Peripheral IV - Single Lumen 07/04/23 0600 18 G Anterior;Left Forearm 20 days         Midline Catheter Insertion/Assessment  - Single Lumen 07/22/23 1603 Left basilic vein (medial side of arm) other (see comments) 2 days                  Significant Labs:  Lab Results   Component Value Date    WBC 10.32 07/23/2023    HGB 10.0 (L) 07/23/2023    HCT 31.4 (L) 07/23/2023    MCV 78.3 (L) 07/23/2023     07/23/2023       BMP  Lab Results   Component Value Date     07/25/2023    K 3.6 07/25/2023    CHLORIDE 103 07/25/2023    CO2 22 07/25/2023    BUN 85.6 (H) 07/25/2023    CREATININE 1.70 (H) 07/25/2023    CALCIUM 8.9 07/25/2023    AGAP 9.0 08/29/2022    ESTGFRAFRICA 101 05/11/2022    EGFRNONAA >60 04/27/2022     ABG  Recent Labs   Lab 07/22/23  0439   PH 7.480*   PO2 78.0*   HCO3 28.3*       Mechanical Ventilation Support:  Vent Mode: A/C (07/25/23 0248)  Ventilator Initiated: Yes (07/21/23 1655)  Set Rate: 20 BPM (07/25/23 0248)  Vt Set: 450 mL (07/25/23 0248)  Pressure Support: 12 cmH20 (07/18/23 1031)  PEEP/CPAP: 5 cmH20 (07/25/23 0248)  Oxygen Concentration (%): 40 (07/25/23 0248)  Peak Airway Pressure: 25 cmH20 (07/25/23 0248)  Total Ve: 10.3 L/m (07/25/23 0248)  F/VT Ratio<105 (RSBI): (!) 48.89 (07/25/23 0248)    Significant Imaging:  I have reviewed the pertinent imaging within the past 24 hours.    Assessment/Plan:     Assessment  Prepatellar bursitis/septic arthritis of the right knee (strep agalactiae)   Post I&D on 06/29/2023   Ortho no longer planning for right above-the-knee amputation in near future due to infectious suppression    Extensive bilateral pulmonary infiltrates (resolved)  DX: Infectious pneumonia versus ARDS versus hydrostatic/non hydrostatic pulmonary edema   Sputum culture showed moderate yeast  Intubated 07/04/2023  Extubated 07/18/2023  Reintubated 07/21/2023 due to increase in O2 requirements    Acute renal failure, oliguric on CKD 4  Nephrology is on board  Acute encephalopathy, likely metabolic related to above  Reported paroxysmal atrial fibrillation with permenant pacemaker, currently in sinus rhythm   H/o chronic paraplegia at T1 2/2 spinal cordy injury, neurogenic bladder, chronic right ankle osteomyelitis, DM2, HTN    Plan  - Continue ICU level of care for ongoing monitoring and medical management  - Continue vent management and sedation with propofol at this time.  Tracheostomy today or tomorrow  -ID, urology, orthopedics, wound care teams following, appreciate their assistance   -Continue electrolytes replacement as needed   - continue ceftriaxone with end date of 08/09 and continuing suppressive doxycycline with further plan of 6 week course for GBS septic arthritis and following inflammatory markers;   - NG tube placed after being intubated,continue TF at 75cc/hr; Continue antihypertensives through NG tube    DVT Prophylaxis: Lovenox 30 b.i.d.  GI Prophylaxis: Pepcid 20    George Shah MD PGY-III  Pulmonary Critical Care Medicine  [facility] - [facility] ICU

## 2023-07-25 NOTE — NURSING
07/25/23 1051   Oxygen Therapy   Device (Oxygen Therapy) ventilator   Oxygen Concentration (%) 50        Incision/Site 06/29/23 1103 Right Leg   Date First Assessed/Time First Assessed: 06/29/23 1103   Side: Right  Location: Leg   Wound Image   (knee right)   Dressing Appearance Intact;Moist drainage   Drainage Amount Small   Drainage Characteristics/Odor Serosanguineous;No odor   Appearance Red;Moist   Red (%), Wound Tissue Color 100 %   Periwound Area Dry;Macerated   Wound Edges Irregular   Wound Length (cm) 2.2 cm   Wound Width (cm) 0.8 cm   Wound Depth (cm) 0.8 cm   Wound Volume (cm^3) 1.408 cm^3   Wound Surface Area (cm^2) 1.76 cm^2   Tunneling (depth (cm)/location) 12 noon 4.4cm   Care Cleansed with:;Sterile normal saline   Dressing Changed        Negative Pressure Wound Therapy  06/29/23 1120 Right anterior   Placement Date/Time: 06/29/23 1120   Side: Right  Orientation: anterior  Location: Leg   NPWT Type Vacuum Therapy   Therapy Setting NPWT Continuous therapy   Pressure Setting NPWT 125 mmHg   Sponges Inserted NPWT Black;White;1   Sponges Removed NPWT Black;White;1        Altered Skin Integrity 06/28/23 2230 medial Sacral spine #1   Date First Assessed/Time First Assessed: 06/28/23 2230   Altered Skin Integrity Present on Admission - Did Patient arrive to the hospital with altered skin?: yes  Orientation: medial  Location: Sacral spine  Wound Number: #1  Is this injury device rel...   Wound Image   (dry and crusty)   Dressing Appearance Intact;Dry   Drainage Amount None   Appearance Pink;Dry   Wound Length (cm) 4 cm   Wound Width (cm) 4 cm   Wound Surface Area (cm^2) 16 cm^2   Care Cleansed with:;Wound cleanser   Dressing Foam        Altered Skin Integrity 06/28/23 2230 Right posterior Buttocks #2   Date First Assessed/Time First Assessed: 06/28/23 2230   Altered Skin Integrity Present on Admission - Did Patient arrive to the hospital with altered skin?: yes  Side: Right  Orientation: posterior   Location: Buttocks  Wound Number: #2   Wound Image    Dressing Appearance Intact   Drainage Amount None   Appearance Dry   Tissue loss description Not applicable   Wound Length (cm) 0.6 cm   Wound Width (cm) 0.3 cm  (pink and dry)   Wound Surface Area (cm^2) 0.18 cm^2   Care Cleansed with:;Wound cleanser   Dressing Foam        Altered Skin Integrity 06/28/23 2230 Left posterior Buttocks #4   Date First Assessed/Time First Assessed: 06/28/23 2230   Altered Skin Integrity Present on Admission - Did Patient arrive to the hospital with altered skin?: yes  Side: Left  Orientation: posterior  Location: Buttocks  Wound Number: #4   Wound Image   (pink and dry)   Dressing Appearance Dry   Drainage Amount None   Appearance Pink;Dry   Tissue loss description Not applicable   Wound Length (cm) 1 cm   Wound Width (cm) 2.5 cm   Wound Surface Area (cm^2) 2.5 cm^2   Care Cleansed with:;Wound cleanser   Dressing Foam        Altered Skin Integrity 06/28/23 2230 Left posterior Heel #5   Date First Assessed/Time First Assessed: 06/28/23 2230   Altered Skin Integrity Present on Admission - Did Patient arrive to the hospital with altered skin?: yes  Side: Left  Orientation: posterior  Location: Heel  Wound Number: #5  Is this injury dev...   Wound Image   (dry and crusty)   Dressing Appearance Intact;Dry   Drainage Amount None   Appearance Pink;White;Dry   Periwound Area Dry   Wound Edges Irregular   Wound Length (cm) 3 cm   Wound Width (cm) 3 cm   Wound Surface Area (cm^2) 9 cm^2   Care Cleansed with:;Wound cleanser   Dressing Foam        Altered Skin Integrity 06/28/23 2230 Right lateral Ankle #6   Date First Assessed/Time First Assessed: 06/28/23 2230   Altered Skin Integrity Present on Admission - Did Patient arrive to the hospital with altered skin?: yes  Side: Right  Orientation: lateral  Location: Ankle  Wound Number: #6  Is this injury dev...   Wound Image    Description of Altered Skin Integrity Partial thickness tissue loss.  Shallow open ulcer with a red or pink wound bed, without slough. Intact or Open/Ruptured Serum-filled blister.   Dressing Appearance Intact;Moist drainage   Drainage Amount Small   Drainage Characteristics/Odor Serosanguineous;No odor   Appearance Moist   Tissue loss description Partial thickness   Red (%), Wound Tissue Color 100 %   Periwound Area Dry   Wound Edges Irregular   Wound Length (cm) 2.6 cm   Wound Width (cm) 2.6 cm   Wound Depth (cm) 0.1 cm   Wound Volume (cm^3) 0.676 cm^3   Wound Surface Area (cm^2) 6.76 cm^2   Undermining (depth (cm)/location) .3   Care Cleansed with:;Sterile normal saline   Dressing Gauze, wet to dry  (with vashe sol)   WOCN follow-up of right knee right outer maleolus-sacrum bilat buttock left heel and left shin lesions.   Sacrum and bilat buttock both fully granulated and pink.   Left heel and left shin ulcers crusty and scabbed.  Changed out the right knee wd vac-he remains on vent and tolerated well.  Right maleolus is vascular yet slow to progress..  CARe concerns with nurse Tristan.  Will continue to follow every 3-4days for right knee vac changes.
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
## 2023-07-26 LAB
ALBUMIN SERPL-MCNC: 2.3 G/DL (ref 3.5–5)
ALBUMIN/GLOB SERPL: 0.5 RATIO (ref 1.1–2)
ALP SERPL-CCNC: 88 UNIT/L (ref 40–150)
ALT SERPL-CCNC: 14 UNIT/L (ref 0–55)
AST SERPL-CCNC: 19 UNIT/L (ref 5–34)
BASOPHILS # BLD AUTO: 0.02 X10(3)/MCL
BASOPHILS NFR BLD AUTO: 0.2 %
BILIRUBIN DIRECT+TOT PNL SERPL-MCNC: 0.2 MG/DL
BUN SERPL-MCNC: 66.9 MG/DL (ref 8.4–25.7)
CALCIUM SERPL-MCNC: 8.8 MG/DL (ref 8.4–10.2)
CHLORIDE SERPL-SCNC: 111 MMOL/L (ref 98–107)
CO2 SERPL-SCNC: 22 MMOL/L (ref 22–29)
CREAT SERPL-MCNC: 1.21 MG/DL (ref 0.73–1.18)
CRP SERPL-MCNC: 68.6 MG/L
EOSINOPHIL # BLD AUTO: 0.16 X10(3)/MCL (ref 0–0.9)
EOSINOPHIL NFR BLD AUTO: 1.6 %
ERYTHROCYTE [DISTWIDTH] IN BLOOD BY AUTOMATED COUNT: 23.6 % (ref 11.5–17)
ERYTHROCYTE [SEDIMENTATION RATE] IN BLOOD: 54 MM/HR (ref 0–15)
GFR SERPLBLD CREATININE-BSD FMLA CKD-EPI: >60 MLS/MIN/1.73/M2
GLOBULIN SER-MCNC: 4.5 GM/DL (ref 2.4–3.5)
GLUCOSE SERPL-MCNC: 147 MG/DL (ref 74–100)
HCT VFR BLD AUTO: 29.3 % (ref 42–52)
HGB BLD-MCNC: 9.3 G/DL (ref 14–18)
IMM GRANULOCYTES # BLD AUTO: 0.05 X10(3)/MCL (ref 0–0.04)
IMM GRANULOCYTES NFR BLD AUTO: 0.5 %
LYMPHOCYTES # BLD AUTO: 2.03 X10(3)/MCL (ref 0.6–4.6)
LYMPHOCYTES NFR BLD AUTO: 20.2 %
MCH RBC QN AUTO: 25.3 PG (ref 27–31)
MCHC RBC AUTO-ENTMCNC: 31.7 G/DL (ref 33–36)
MCV RBC AUTO: 79.8 FL (ref 80–94)
MONOCYTES # BLD AUTO: 1 X10(3)/MCL (ref 0.1–1.3)
MONOCYTES NFR BLD AUTO: 10 %
NEUTROPHILS # BLD AUTO: 6.79 X10(3)/MCL (ref 2.1–9.2)
NEUTROPHILS NFR BLD AUTO: 67.5 %
NRBC BLD AUTO-RTO: 0 %
PLATELET # BLD AUTO: 583 X10(3)/MCL (ref 130–400)
PMV BLD AUTO: 9.5 FL (ref 7.4–10.4)
POTASSIUM SERPL-SCNC: 4.4 MMOL/L (ref 3.5–5.1)
PROT SERPL-MCNC: 6.8 GM/DL (ref 6.4–8.3)
RBC # BLD AUTO: 3.67 X10(6)/MCL (ref 4.7–6.1)
SODIUM SERPL-SCNC: 142 MMOL/L (ref 136–145)
WBC # SPEC AUTO: 10.05 X10(3)/MCL (ref 4.5–11.5)

## 2023-07-26 PROCEDURE — 25000242 PHARM REV CODE 250 ALT 637 W/ HCPCS: Performed by: INTERNAL MEDICINE

## 2023-07-26 PROCEDURE — 25000003 PHARM REV CODE 250: Performed by: INTERNAL MEDICINE

## 2023-07-26 PROCEDURE — 94003 VENT MGMT INPAT SUBQ DAY: CPT

## 2023-07-26 PROCEDURE — 20000000 HC ICU ROOM

## 2023-07-26 PROCEDURE — 85025 COMPLETE CBC W/AUTO DIFF WBC: CPT | Performed by: NURSE PRACTITIONER

## 2023-07-26 PROCEDURE — 86140 C-REACTIVE PROTEIN: CPT | Performed by: NURSE PRACTITIONER

## 2023-07-26 PROCEDURE — 63600175 PHARM REV CODE 636 W HCPCS: Performed by: INTERNAL MEDICINE

## 2023-07-26 PROCEDURE — 94640 AIRWAY INHALATION TREATMENT: CPT

## 2023-07-26 PROCEDURE — 63600175 PHARM REV CODE 636 W HCPCS

## 2023-07-26 PROCEDURE — 27200966 HC CLOSED SUCTION SYSTEM

## 2023-07-26 PROCEDURE — 94799 UNLISTED PULMONARY SVC/PX: CPT

## 2023-07-26 PROCEDURE — 27000221 HC OXYGEN, UP TO 24 HOURS

## 2023-07-26 PROCEDURE — 85652 RBC SED RATE AUTOMATED: CPT | Performed by: NURSE PRACTITIONER

## 2023-07-26 PROCEDURE — 99900031 HC PATIENT EDUCATION (STAT)

## 2023-07-26 PROCEDURE — 94761 N-INVAS EAR/PLS OXIMETRY MLT: CPT

## 2023-07-26 PROCEDURE — 99900026 HC AIRWAY MAINTENANCE (STAT)

## 2023-07-26 PROCEDURE — 99900035 HC TECH TIME PER 15 MIN (STAT)

## 2023-07-26 PROCEDURE — 25000003 PHARM REV CODE 250

## 2023-07-26 PROCEDURE — 25000003 PHARM REV CODE 250: Performed by: STUDENT IN AN ORGANIZED HEALTH CARE EDUCATION/TRAINING PROGRAM

## 2023-07-26 PROCEDURE — 80053 COMPREHEN METABOLIC PANEL: CPT | Performed by: INTERNAL MEDICINE

## 2023-07-26 RX ADMIN — AMLODIPINE BESYLATE 10 MG: 5 TABLET ORAL at 08:07

## 2023-07-26 RX ADMIN — FENOFIBRATE 48 MG: 48 TABLET, FILM COATED ORAL at 09:07

## 2023-07-26 RX ADMIN — ATORVASTATIN CALCIUM 20 MG: 10 TABLET, FILM COATED ORAL at 08:07

## 2023-07-26 RX ADMIN — DOXYCYCLINE HYCLATE 100 MG: 100 TABLET, COATED ORAL at 08:07

## 2023-07-26 RX ADMIN — OXYCODONE AND ACETAMINOPHEN 1 TABLET: 10; 325 TABLET ORAL at 01:07

## 2023-07-26 RX ADMIN — GUAIFENESIN 400 MG: 200 SOLUTION ORAL at 01:07

## 2023-07-26 RX ADMIN — SODIUM CHLORIDE 30 MG/ML INHALATION SOLUTION 4 ML: 30 SOLUTION INHALANT at 07:07

## 2023-07-26 RX ADMIN — FENTANYL CITRATE 50 MCG: 50 INJECTION, SOLUTION INTRAMUSCULAR; INTRAVENOUS at 01:07

## 2023-07-26 RX ADMIN — OXYBUTYNIN CHLORIDE 5 MG: 5 TABLET ORAL at 08:07

## 2023-07-26 RX ADMIN — SODIUM CHLORIDE 30 MG/ML INHALATION SOLUTION 4 ML: 30 SOLUTION INHALANT at 08:07

## 2023-07-26 RX ADMIN — CARVEDILOL 25 MG: 12.5 TABLET, FILM COATED ORAL at 08:07

## 2023-07-26 RX ADMIN — PROPOFOL 50 MCG/KG/MIN: 10 INJECTION, EMULSION INTRAVENOUS at 08:07

## 2023-07-26 RX ADMIN — OXYCODONE AND ACETAMINOPHEN 1 TABLET: 10; 325 TABLET ORAL at 04:07

## 2023-07-26 RX ADMIN — SENNOSIDES AND DOCUSATE SODIUM 2 TABLET: 50; 8.6 TABLET ORAL at 08:07

## 2023-07-26 RX ADMIN — MICONAZOLE NITRATE 2 % TOPICAL POWDER: at 08:07

## 2023-07-26 RX ADMIN — FENTANYL CITRATE 50 MCG: 50 INJECTION, SOLUTION INTRAMUSCULAR; INTRAVENOUS at 05:07

## 2023-07-26 RX ADMIN — ENOXAPARIN SODIUM 30 MG: 30 INJECTION SUBCUTANEOUS at 08:07

## 2023-07-26 RX ADMIN — GUAIFENESIN 400 MG: 200 SOLUTION ORAL at 09:07

## 2023-07-26 RX ADMIN — CEFTRIAXONE SODIUM 2 G: 2 INJECTION, POWDER, FOR SOLUTION INTRAMUSCULAR; INTRAVENOUS at 11:07

## 2023-07-26 RX ADMIN — PROPOFOL 50 MCG/KG/MIN: 10 INJECTION, EMULSION INTRAVENOUS at 06:07

## 2023-07-26 RX ADMIN — LABETALOL HYDROCHLORIDE 10 MG: 5 INJECTION, SOLUTION INTRAVENOUS at 10:07

## 2023-07-26 RX ADMIN — FAMOTIDINE 20 MG: 10 INJECTION, SOLUTION INTRAVENOUS at 08:07

## 2023-07-26 RX ADMIN — HYDRALAZINE HYDROCHLORIDE 10 MG: 20 INJECTION INTRAMUSCULAR; INTRAVENOUS at 10:07

## 2023-07-26 RX ADMIN — GUAIFENESIN 400 MG: 200 SOLUTION ORAL at 05:07

## 2023-07-26 RX ADMIN — IPRATROPIUM BROMIDE AND ALBUTEROL SULFATE 3 ML: 2.5; .5 SOLUTION RESPIRATORY (INHALATION) at 07:07

## 2023-07-26 RX ADMIN — IPRATROPIUM BROMIDE AND ALBUTEROL SULFATE 3 ML: 2.5; .5 SOLUTION RESPIRATORY (INHALATION) at 08:07

## 2023-07-26 RX ADMIN — GUAIFENESIN 400 MG: 200 SOLUTION ORAL at 06:07

## 2023-07-26 RX ADMIN — PROPOFOL 50 MCG/KG/MIN: 10 INJECTION, EMULSION INTRAVENOUS at 03:07

## 2023-07-26 NOTE — PROGRESS NOTES
[facility] - [facility]  Pulmonary Critical Care Note    Patient Name: Bianca Khan  MRN: [id number]  Admission Date: 6/28/2023  Hospital Length of Stay: 28 days  Code Status: Full Code  Attending Provider: Khris Treadwell Jr., MD, *  Primary Care Provider: Areli Vargas PA-C     Subjective:     HPI:   Bianca Khan is a 58 y.o. male with PMH of paraplegia 2/2 spinal cord injury (T1-T6), neurogenic bladder with suprapubic catheter, chronic right ankle osteomyelitis, DM II, HTN, who presented to the ED on 6/28/2023 with CC of right knee pain. Per chart review, CT of right knee revealed 5 cm area of subcutaneous fluid in prepatellar region which was aspirated in the ED; he was taken to the OR on 6/29/2023 by orthopedic surgery team and was found to have prepatellar bursa infection and septic arthritis. Wound culture 6/29/2023 positive for strep agalactiae. Orthopedic surgery team recommended right-sided above-knee amputation d/t chronically infected hardware in right lower extremity. On 7/4/2023, a RRT was called d/t acute respiratory distress that was not improving despite being placed on vapotherm at 35 L/min with FiO2 100%. At the time of examination, patient's initial GCS was 10 but progressively reduced to a GCS of 6. Shared decision with patient's wife was made to intubate patient for airway protection though ABGs were within normal limits. He was admitted to the ICU for close monitoring and further medical management.    Hospital Course/Significant events:  6/29/2023 - I&D of right knee  7/4/2023 - Admitted to ICU, intubated for airway protection d/t altered mental status. Trialysis catheter placed and HD began  7/18/2023 - Extubated  7/21/23 - Reintubated 2/2 increasing O2 requirements    24 Hour Interval History:   Patient continues to desat overnight but improves with frequent suctioning but has not needed to increase FiO2 overnight.  Patient denies chest pain, abdominal pain,  lightheadedness, dizziness peripheral edema.    Past Medical History:   Diagnosis Date    Arthritis     Chronic ulcer of ankle 05/26/2022    Frequent UTI 07/02/2019    Generalized anxiety disorder 05/26/2022    Neurogenic bladder 05/26/2022    Osteomyelitis 05/26/2022    Presence of suprapubic catheter 05/26/2022    Pure hypercholesterolemia 05/26/2022    Retention of urine, unspecified 08/09/2019    Spinal cord injury at T1-T6 level 04/20/2018       Past Surgical History:   Procedure Laterality Date    INCISION AND DRAINAGE, LOWER EXTREMITY Right 6/29/2023    Procedure: INCISION AND DRAINAGE, LOWER EXTREMITY;  Surgeon: Prabhu Shen DO;  Location: [facility];  Service: Orthopedics;  Laterality: Right;  supine bone foam wash stuff cultures    INSERTION OF INTRAMEDULLARY MARISA Right 8/10/2022    Procedure: INSERTION, INTRAMEDULLARY MARISA RIGHT TIBIA;  Surgeon: Jorge Orellana MD;  Location: [facility];  Service: Orthopedics;  Laterality: Right;       Social History     Socioeconomic History    Marital status:    Tobacco Use    Smoking status: Every Day     Types: Cigars, Cigarettes    Smokeless tobacco: Never   Substance and Sexual Activity    Alcohol use: Yes     Alcohol/week: 2.0 standard drinks     Types: 2 Cans of beer per week    Drug use: Not Currently    Sexual activity: Never       Current Outpatient Medications   Medication Instructions    amitriptyline (ELAVIL) 50 mg, Oral, Nightly    amLODIPine (NORVASC) 10 MG tablet 1 tablet    atorvastatin (LIPITOR) 20 mg, Oral, Nightly    busPIRone (BUSPAR) 5 MG Tab 1 tablet    carvediloL (COREG) 12.5 mg, Oral    cyclobenzaprine (FLEXERIL) 10 mg, Oral, 2 times daily PRN    doxycycline (VIBRAMYCIN) 100 mg, Oral, Daily    fenofibrate 160 mg, Oral    FLUoxetine 20 mg, Oral    gabapentin (NEURONTIN) 300 MG capsule 1 capsule    lisinopriL (PRINIVIL,ZESTRIL) 10 mg, Oral, Nightly    lisinopriL 10 mg, Oral, Daily    LORazepam (ATIVAN) 1 mg, Oral, 2 times daily    melatonin  (MELATIN) 3 mg tablet TAKE TWO TABLETS BY MOUTH EVERY NIGHT AT BEDTIME AS NEEDED FOR INSOMNIA.    meloxicam (MOBIC) 15 mg, Oral, Daily    metFORMIN (GLUCOPHAGE) 500 MG tablet SMARTSI Tablet(s) By Mouth Morning-Evening    oxybutynin (DITROPAN) 5 mg, Oral, 2 times daily    oxyCODONE-acetaminophen (PERCOCET)  mg per tablet 1 tablet, Oral, Every 6 hours PRN    pantoprazole (PROTONIX) 40 MG tablet 1 tablet    temazepam (RESTORIL) 30 mg, Oral, Nightly    traZODone (DESYREL) 50 mg, Oral, Nightly    XARELTO 20 mg Tab SMARTSI Tablet(s) By Mouth Every Evening     Current Inpatient Medications   albuterol-ipratropium  3 mL Nebulization BID    amLODIPine  10 mg Per NG tube Daily    atorvastatin  20 mg Per NG tube Nightly    carvediloL  25 mg Per NG tube BID    cefTRIAXone (ROCEPHIN) IVPB  2 g Intravenous Q24H    cloNIDine 0.2 mg/24 hr td ptwk  1 patch Transdermal Q7 Days    doxycycline  100 mg Per NG tube Q12H    enoxaparin  30 mg Subcutaneous Q12H    famotidine (PF)  20 mg Intravenous Daily    fenofibrate  48 mg Per NG tube Daily    guaiFENesin 100 mg/5 ml  400 mg Per NG tube Q4H    miconazole NITRATE 2 %   Topical (Top) BID    oxybutynin  5 mg Per NG tube BID    senna-docusate 8.6-50 mg  2 tablet Per NG tube BID    sodium chloride 3%  4 mL Nebulization BID    sodium citrate-citric acid 500-334 mg/5 ml  30 mL Oral Once     Current Intravenous Infusions   sodium chloride 0.9% 75 mL/hr at 23 1936    clevidipine Stopped (23 190)    dexmedeTOMIDine (Precedex) infusion (titrating) 0.2 mcg/kg/hr (23)    NORepinephrine bitartrate-D5W Stopped (23 1039)    propofoL 50 mcg/kg/min (23 0329)     Review of Systems   Unable to perform ROS: Intubated       Objective:     Intake/Output Summary (Last 24 hours) at 2023 0427  Last data filed at 2023 0400  Gross per 24 hour   Intake 2813.91 ml   Output 2850 ml   Net -36.09 ml       Vital Signs (Most Recent):  Temp: 99.1 °F (37.3 °C)  (07/26/23 0330)  Pulse: 86 (07/26/23 0400)  Resp: (!) 26 (07/26/23 0423)  BP: (!) 151/85 (07/26/23 0400)  SpO2: 100 % (07/26/23 0400)  Body mass index is 27.51 kg/m².  Weight: 86.2 kg (190 lb) Vital Signs (24h Range):  Temp:  [98.4 °F (36.9 °C)-99.1 °F (37.3 °C)] 99.1 °F (37.3 °C)  Pulse:  [] 86  Resp:  [16-32] 26  SpO2:  [85 %-100 %] 100 %  BP: (104-164)/(61-98) 151/85     General: no acute respiratory distress  Head: Normocephalic, atraumatic  Eyes: PERRL, EOMI, anicteric sclera    Neck: supple, normal ROM, no thyromegaly   CVS:  RRR, S1 and S2 normal, no murmurs, no added heart sounds, rubs, gallops, 2+ peripheral pulses  Resp:  Coarse breath sounds  GI:  Abdomen soft, non-tender, non-distended, normoactive bowel sounds  MSK:  Able to move all extremities  Skin:  No rashes, ulcers, erythema  Neuro:  He is able to answer yes or no questions with his shake or nod and follow commands      Lines/Drains/Airways       Drain  Duration                  Suprapubic Catheter 07/06/23 1544 100% silicone 22 Fr. 19 days         NG/OG Tube 07/21/23 1000 Pesotum sump 16 Fr. Left nostril 4 days         Rectal Tube 07/22/23 0400 rectal tube w/ balloon (indicate number of mLs) 4 days              Airway  Duration                  Airway - Non-Surgical 07/21/23 1655 4 days              Peripheral Intravenous Line  Duration                  Peripheral IV - Single Lumen 07/04/23 0600 18 G Anterior;Left Forearm 21 days         Midline Catheter Insertion/Assessment  - Single Lumen 07/22/23 1603 Left basilic vein (medial side of arm) other (see comments) 3 days                  Significant Labs:  Lab Results   Component Value Date    WBC 10.05 07/26/2023    HGB 9.3 (L) 07/26/2023    HCT 29.3 (L) 07/26/2023    MCV 79.8 (L) 07/26/2023     (H) 07/26/2023       BMP  Lab Results   Component Value Date     07/26/2023    K 4.4 07/26/2023    CHLORIDE 111 (H) 07/26/2023    CO2 22 07/26/2023    BUN 66.9 (H) 07/26/2023     CREATININE 1.21 (H) 07/26/2023    CALCIUM 8.8 07/26/2023    AGAP 9.0 08/29/2022    ESTGFRAFRICA 101 05/11/2022    EGFRNONAA >60 04/27/2022     ABG  Recent Labs   Lab 07/22/23  0439   PH 7.480*   PO2 78.0*   HCO3 28.3*       Mechanical Ventilation Support:  Vent Mode: A/C (07/26/23 0214)  Ventilator Initiated: Yes (07/21/23 1655)  Set Rate: 20 BPM (07/26/23 0214)  Vt Set: 450 mL (07/26/23 0214)  Pressure Support: 12 cmH20 (07/18/23 1031)  PEEP/CPAP: 5 cmH20 (07/26/23 0214)  Oxygen Concentration (%): 40 (07/26/23 0214)  Peak Airway Pressure: 21 cmH20 (07/26/23 0214)  Total Ve: 9.7 L/m (07/26/23 0214)  F/VT Ratio<105 (RSBI): (!) 49.02 (07/26/23 0214)    Significant Imaging:  I have reviewed the pertinent imaging within the past 24 hours.    Assessment/Plan:     Assessment  Prepatellar bursitis/septic arthritis of the right knee (strep agalactiae)   Post I&D on 06/29/2023   Ortho no longer planning for right above-the-knee amputation in near future due to infectious suppression    Extensive bilateral pulmonary infiltrates (resolved)  DX: Infectious pneumonia versus ARDS versus hydrostatic/non hydrostatic pulmonary edema   Sputum culture showed moderate yeast  Intubated 07/04/2023  Extubated 07/18/2023  Reintubated 07/21/2023 due to increase in O2 requirements   Acute renal failure, oliguric on CKD 4  Nephrology is on board  Acute encephalopathy, likely metabolic related to above  Reported paroxysmal atrial fibrillation with permenant pacemaker, currently in sinus rhythm   H/o chronic paraplegia at T1 2/2 spinal cordy injury, neurogenic bladder, chronic right ankle osteomyelitis, DM2, HTN    Plan  - Continue ICU level of care for ongoing monitoring and medical management  - Continue vent management and sedation with propofol at this time.  Will need surgical tracheostomy performed in OR.  ENT consulted  -ID, urology, orthopedics, wound care teams following, appreciate their assistance   -Continue electrolytes replacement  as needed   - continue ceftriaxone with end date of 08/09 and continuing suppressive doxycycline with further plan of 6 week course for GBS septic arthritis and following inflammatory markers;   - NG tube placed after being intubated,continue TF at 75cc/hr; Continue antihypertensives through NG tube  -PEG to be placed at time of tracheostomy in OR attempting to coordinate ENT    DVT Prophylaxis: Lovenox 30 b.i.d.  GI Prophylaxis: Pepcid 20    George Shah MD PGY-III  Pulmonary Critical Care Medicine  [facility] - [facility]

## 2023-07-26 NOTE — PROGRESS NOTES
Acute Care Surgery   Progress Note  Admit Date: 2023  HD#28  POD#27 Days Post-Op    Subjective:   Interval history:  AFVSS  On ventilator, ACVC/450/20/5/40%  On tube feeds via NGT at 45 mL/h  Sedated with propofol  UOP adequate at 2.8L (1.4 mL/kg/h)    Home Meds:  Current Outpatient Medications   Medication Instructions    amitriptyline (ELAVIL) 50 mg, Oral, Nightly    amLODIPine (NORVASC) 10 MG tablet 1 tablet    atorvastatin (LIPITOR) 20 mg, Oral, Nightly    busPIRone (BUSPAR) 5 MG Tab 1 tablet    carvediloL (COREG) 12.5 mg, Oral    cyclobenzaprine (FLEXERIL) 10 mg, Oral, 2 times daily PRN    doxycycline (VIBRAMYCIN) 100 mg, Oral, Daily    fenofibrate 160 mg, Oral    FLUoxetine 20 mg, Oral    gabapentin (NEURONTIN) 300 MG capsule 1 capsule    lisinopriL (PRINIVIL,ZESTRIL) 10 mg, Oral, Nightly    lisinopriL 10 mg, Oral, Daily    LORazepam (ATIVAN) 1 mg, Oral, 2 times daily    melatonin (MELATIN) 3 mg tablet TAKE TWO TABLETS BY MOUTH EVERY NIGHT AT BEDTIME AS NEEDED FOR INSOMNIA.    meloxicam (MOBIC) 15 mg, Oral, Daily    metFORMIN (GLUCOPHAGE) 500 MG tablet SMARTSI Tablet(s) By Mouth Morning-Evening    oxybutynin (DITROPAN) 5 mg, Oral, 2 times daily    oxyCODONE-acetaminophen (PERCOCET)  mg per tablet 1 tablet, Oral, Every 6 hours PRN    pantoprazole (PROTONIX) 40 MG tablet 1 tablet    temazepam (RESTORIL) 30 mg, Oral, Nightly    traZODone (DESYREL) 50 mg, Oral, Nightly    XARELTO 20 mg Tab SMARTSI Tablet(s) By Mouth Every Evening      Scheduled Meds:   albuterol-ipratropium  3 mL Nebulization BID    amLODIPine  10 mg Per NG tube Daily    atorvastatin  20 mg Per NG tube Nightly    carvediloL  25 mg Per NG tube BID    cefTRIAXone (ROCEPHIN) IVPB  2 g Intravenous Q24H    cloNIDine 0.2 mg/24 hr td ptwk  1 patch Transdermal Q7 Days    doxycycline  100 mg Per NG tube Q12H    enoxaparin  30 mg Subcutaneous Q12H    famotidine (PF)  20 mg Intravenous Daily    fenofibrate  48 mg Per NG tube Daily     "guaiFENesin 100 mg/5 ml  400 mg Per NG tube Q4H    miconazole NITRATE 2 %   Topical (Top) BID    oxybutynin  5 mg Per NG tube BID    senna-docusate 8.6-50 mg  2 tablet Per NG tube BID    sodium chloride 3%  4 mL Nebulization BID    sodium citrate-citric acid 500-334 mg/5 ml  30 mL Oral Once     Continuous Infusions:   sodium chloride 0.9% 75 mL/hr at 23 1936    clevidipine Stopped (23)    dexmedeTOMIDine (Precedex) infusion (titrating) 0.2 mcg/kg/hr (23)    NORepinephrine bitartrate-D5W Stopped (23 103)    propofoL 50 mcg/kg/min (23)     PRN Meds:sodium chloride, acetaminophen, etomidate, [] fentaNYL **FOLLOWED BY** fentaNYL, fentaNYL, hydrALAZINE, labetalol, oxyCODONE-acetaminophen, rocuronium, sodium chloride 0.9%, sodium chloride 0.9%     Objective:     VITAL SIGNS: 24 HR MIN & MAX LAST   Temp  Min: 98.4 °F (36.9 °C)  Max: 99.1 °F (37.3 °C)  99.1 °F (37.3 °C)   BP  Min: 104/64  Max: 164/82  137/82    Pulse  Min: 68  Max: 101  98    Resp  Min: 16  Max: 33  (!) 33    SpO2  Min: 84 %  Max: 100 %  (!) 84 %      HT: 5' 9.69" (177 cm)  WT: 86.2 kg (190 lb)  BMI: 27.5     Intake/output:  Intake/Output - Last 3 Shifts          07 0659  0659    P.O. 814     I.V. (mL/kg) 1340.6 (15.6) 2114.6 (24.5)    NG/GT 1070 1016    IV Piggyback  228.2    Total Intake(mL/kg) 3224.6 (37.4) 3358.8 (39)    Urine (mL/kg/hr) 2950 (1.4) 2850 (1.4)    Other 0 0    Stool 150 150    Total Output 3100 3000    Net +124.6 +358.8                  Intake/Output Summary (Last 24 hours) at 2023 0650  Last data filed at 2023 0508  Gross per 24 hour   Intake 3358.79 ml   Output 3000 ml   Net 358.79 ml           Lines/drains/airway:       Peripheral IV - Single Lumen 23 0600 18 G Anterior;Left Forearm (Active)   Site Assessment Clean;Dry;Intact;No redness;No swelling;No warmth;No drainage 23 0400   Extremity Assessment Distal to IV No abnormal " discoloration 07/26/23 0400   Line Status Saline locked 07/26/23 0400   Dressing Status Clean;Dry;Intact 07/26/23 0400   Dressing Intervention Integrity maintained 07/26/23 0400   Number of days: 22            Midline Catheter Insertion/Assessment  - Single Lumen 07/22/23 1603 Left basilic vein (medial side of arm) other (see comments) (Active)   $ Midline Charges (Upon insertion) Bedside Insertion Performed Pt > 3 Years Old;Midline Catheter (Supply) 07/22/23 1600   Site Assessment Clean;Dry;Intact;No redness;No swelling;No warmth 07/26/23 0400   IV Device Securement catheter securement device 07/26/23 0400   Line Status Infusing 07/26/23 0400   Dressing Type Central line dressing 07/26/23 0400   Dressing Status Clean;Dry;Intact 07/26/23 0400   Dressing Intervention Integrity maintained 07/26/23 0400   Dressing Change Due 08/02/23 07/26/23 0400   Number of days: 3            NG/OG Tube 07/21/23 1000 [place] sump 16 Fr. Left nostril (Active)   Placement Check placement verified by aspirate characteristics 07/26/23 0400   Distal Tube Length (cm) 57 07/25/23 2000   Tolerance no signs/symptoms of discomfort 07/26/23 0400   Securement secured to nostril center 07/26/23 0400   Clamp Status/Tolerance unclamped 07/26/23 0400   Suction Setting/Drainage Method suction at the bedside 07/26/23 0400   Insertion Site Appearance no redness, warmth, tenderness, skin breakdown, drainage 07/26/23 0400   Flush/Irrigation flushed w/;water;no resistance met 07/26/23 0400   Feeding Type continuous;by pump 07/26/23 0400   Feeding Action feeding continued 07/26/23 0400   Current Rate (mL/hr) 45 mL/hr 07/26/23 0400   Goal Rate (mL/hr) 45 mL/hr 07/26/23 0400   Intake (mL) 309 mL 07/26/23 0508   Water Bolus (mL) 100 mL 07/26/23 0508   Rate Formula Tube Feeding (mL/hr) 70 mL/hr 07/24/23 0000   Formula Name Novasource Renal 07/26/23 0400   Intake (mL) - Formula Tube Feeding 358 07/24/23 0600   Number of days: 4            Rectal Tube 07/22/23  0400 rectal tube w/ balloon (indicate number of mLs) (Active)   Balloon Inflation Volume (mL) 45 07/25/23 1600   Reposition drainage bags for BMS & Stover on opposite sides of bed 07/26/23 0400   Outcome stool evacuated 07/25/23 1600   Stool Color Brown 07/26/23 0400   Insertion Site Appearance no redness, warmth, tenderness, skin breakdown, drainage 07/26/23 0400   Flush/Irrigation flushed w/;water 07/25/23 1600   Intake (mL) 45 mL 07/25/23 2207   Rectal Tube Output 150 mL 07/26/23 0508   Number of days: 4            Suprapubic Catheter 07/06/23 1544 100% silicone 22 Fr. (Active)   Clamp Status unclamped 07/26/23 0400   Dressing no dressing 07/26/23 0400   Characteristics no redness;no warmth;no drainage;no tenderness;no swelling 07/26/23 0400   Urine Color Yellow 07/26/23 0400   Collection Container Standard drainage bag 07/26/23 0400   Securement secured to upper leg w/ adhesive device 07/25/23 1600   Output (mL) 150 mL 07/26/23 0508   Number of days: 19       Physical examination:  Gen: NAD  HEENT: NCAT. NGT in place, feeds at 45mL/h  CV: RR  Resp: On ventilator, ACVC/450/20/5/40%  Abd: S/NT/ND, scar from previous PEG  Neuro: sedated on propofol    Labs:  Renal:  Recent Labs     07/24/23  0315 07/25/23  0205 07/26/23  0143   BUN 86.0* 85.6* 66.9*   CREATININE 2.17* 1.70* 1.21*     No results for input(s): LACTIC in the last 72 hours.  FENGI:  Recent Labs     07/24/23  0315 07/25/23  0205 07/26/23  0143   * 137 142   K 3.8 3.6 4.4   CO2 23 22 22   CALCIUM 8.6 8.9 8.8   PHOS  --  3.6  --    ALBUMIN 2.4* 2.2* 2.3*   BILITOT 0.3  --  0.2   AST 16  --  19   ALKPHOS 84  --  88   ALT 17  --  14     Heme:  Recent Labs     07/25/23  0515 07/26/23  0143   HGB 9.9* 9.3*   HCT 31.5* 29.3*   * 583*     ID:  Recent Labs     07/25/23  0515 07/26/23  0143   WBC 9.58 10.05     CBG:  Recent Labs     07/24/23  0315 07/25/23  0205 07/26/23  0143   GLUCOSE 152* 140* 147*      Cardiovascular:  Recent Labs   Lab  07/22/23  0208 07/25/23  0205   .3* 77.6     I have reviewed all pertinent lab results within the past 24 hours.    Imaging:  X-Ray Chest 1 View   Final Result      As above.         Electronically signed by: Konrad Robertson   Date:    07/23/2023   Time:    12:35      X-Ray Chest 1 View   Final Result      As above.  No adverse interval change.         Electronically signed by: Konrad Robertson   Date:    07/22/2023   Time:    07:57      XR Gastric tube check, non-radiologist performed   Final Result      As above.         Electronically signed by: Mikal White   Date:    07/21/2023   Time:    13:04      X-Ray Chest 1 View   Final Result      As above.         Electronically signed by: Konrad Robertson   Date:    07/21/2023   Time:    07:09      X-Ray Chest 1 View   Final Result      Interval extubation.  Improved aeration of the left lung base with some persistent bibasilar opacities and small effusions suspected.         Electronically signed by: Mikal White   Date:    07/20/2023   Time:    08:52      X-Ray Chest 1 View   Final Result      Stable chest         Electronically signed by: Dipak Multani MD   Date:    07/16/2023   Time:    10:39      X-Ray Chest 1 View   Final Result      No change since previous         Electronically signed by: Gloria Gomez   Date:    07/15/2023   Time:    13:01      X-Ray Chest 1 View   Final Result      No significant interval change.         Electronically signed by: Mikal White   Date:    07/15/2023   Time:    08:32      X-Ray Chest 1 View   Final Result      Left-sided central line tip overlies the mid SVC.         Electronically signed by: Mikal White   Date:    07/14/2023   Time:    14:04      X-Ray Chest 1 View   Final Result      Little overall change since 07/11/2023.         Electronically signed by: Gustavo Cassidy   Date:    07/14/2023   Time:    07:00      X-Ray Chest 1 View   Final Result      Endotracheal tube partially obscured by fusion hardware but appears  to terminate an estimated 5-10 mm above the marija.         Electronically signed by: Tracy Zamudio   Date:    07/11/2023   Time:    15:28      X-Ray Chest 1 View   Final Result      Mild overall worsening of bilateral mid/lower lung opacities.         Electronically signed by: Gustavo Cassidy   Date:    07/10/2023   Time:    16:03      X-Ray Chest 1 View   Final Result      Lines and tubes as above without significant interval change.         Electronically signed by: Wyatt Jordan MD   Date:    07/08/2023   Time:    14:10      X-Ray Chest 1 View   Final Result      Progressive interstitial opacities may be related to interstitial edema, pneumonia or ARDS.         Electronically signed by: Tracy Zamudio   Date:    07/06/2023   Time:    19:25      X-Ray Chest 1 View   Final Result      Overall improving aeration with small bilateral pleural effusions.         Electronically signed by: Lanette Waller   Date:    07/06/2023   Time:    06:15      X-Ray Chest 1 View   Final Result      Bilateral lower lung opacities with mild worsening of right lung aeration since yesterday.         Electronically signed by: Gustavo Cassidy   Date:    07/05/2023   Time:    06:06      XR Gastric tube check, non-radiologist performed   Final Result      X-Ray Chest 1 View   Final Result      Placement of line with no acute complications seen.         Electronically signed by: Ankit Davila MD   Date:    07/04/2023   Time:    09:55      CT Abdomen Pelvis  Without Contrast   Final Result   Impression:      1. Bilateral small-to-moderate pleural effusions are seen with adjacent compressive atelectasis versus consolidation. This has increased on the left and new on the right. The prior study demonstrates trace left pleural effusion.      2. Ill-defined confluent airspace opacities are seen in the right middle lobe, lingula and right lower lobe. These findings are new since the prior examination. This is consistent with infectious pneumonia.  Follow-up as clinically indicated.      3. There is mild ascites, new since the prior examination.      4. Details and other findings as discussed above.      I concur with the preliminary report above.         Electronically signed by: Gloria Gomez   Date:    07/04/2023   Time:    08:02      X-Ray Chest 1 View   Final Result      Good position of endotracheal tube.         Electronically signed by: Ankit Davila MD   Date:    07/04/2023   Time:    09:58      X-Ray Chest 1 View   Final Result      Bilateral lung opacities, mildly greater on the right.  Both pulmonary edema and pneumonia possible.         Electronically signed by: Gustavo Cassidy   Date:    07/03/2023   Time:    20:23      US Retroperitoneal Limited   Final Result      No significant sonographic abnormality of the kidneys.         Electronically signed by: Mikal White   Date:    07/03/2023   Time:    09:47      CT Abdomen Pelvis  Without Contrast   Final Result      No overt acute process of the abdomen or pelvis with other secondary chronic secondary findings.         Electronically signed by: Wyatt Jordan MD   Date:    06/30/2023   Time:    20:34      CT Knee W W/O Contrast Right   Final Result      Mildly limited assessment.  5 cm area of subcutaneous fluid in the prepatellar region may have thin peripheral enhancement.  Fluid collection is possible.      Subcutaneous edema in the calf.         Electronically signed by: Gustavo Cassidy   Date:    06/28/2023   Time:    18:37      X-Ray Tibia Fibula 2 View Right   Final Result      Posttraumatic and postsurgical changes at the femur and lower leg.  There is chronic deformity at the distal femur with heterogeneous sclerosis and lucency superimposed on background bony demineralization.  No old imaging of this region available for comparison.  This makes it difficult to evaluate for acute superimposed lytic change.      Postsurgical and posttraumatic change at the tibia and fibula with bony  demineralization.  No appreciable acute osseous abnormality.         Electronically signed by: Tracy Zamudio   Date:    06/28/2023   Time:    18:14      X-Ray Femur 2 View Right   Final Result      Posttraumatic and postsurgical changes at the femur and lower leg.  There is chronic deformity at the distal femur with heterogeneous sclerosis and lucency superimposed on background bony demineralization.  No old imaging of this region available for comparison.  This makes it difficult to evaluate for acute superimposed lytic change.      Postsurgical and posttraumatic change at the tibia and fibula with bony demineralization.  No appreciable acute osseous abnormality.         Electronically signed by: Tracy Zamudio   Date:    06/28/2023   Time:    18:14      X-Ray Knee 3 View Right   Final Result      1. No acute bony abnormality.   2. Soft tissue swelling around the knee.         Electronically signed by: Lanette Waller   Date:    06/28/2023   Time:    14:18         I have reviewed all pertinent imaging results/findings within the past 24 hours.    Micro/Path/Other:  Microbiology Results (last 7 days)       Procedure Component Value Units Date/Time    Clostridium Diff Toxin, A & B, EIA [[id number]]  (Normal) Collected: 07/22/23 1459    Order Status: Completed Specimen: Stool Updated: 07/22/23 1533     Clostridium Difficile GDH Antigen Negative     Clostridium Difficile Toxin A/B Negative    C. Difficile By Rapid Pcr [[id number]] Collected: 07/22/23 1459    Order Status: Canceled Specimen: Stool Updated: 07/22/23 1459           Specimen (168h ago, onward)      None             Assessment & Plan:   Bianca Khan is a 58 y.o. M with above noted history admitted to the ICU with septic joint, now with respiratory decline and inability to wean from ventilator. ACS consulted for PEG placement in conjunction with ENT trach.      - Will discuss surgical timing with ENT today.   - Remainder of POC per primary    Brooke  MD Trisha  [employer] General Surgery, PGY-2

## 2023-07-26 NOTE — NURSING
Nurses Note -- 4 Eyes      7/25/2023   9:32 PM      Skin assessed during: Q Shift Change      [] No Altered Skin Integrity Present    [x]Prevention Measures Documented      [x] Yes- Altered Skin Integrity Present or Discovered   [] LDA Added if Not in Epic (Describe Wound)   [] New Altered Skin Integrity was Present on Admit and Documented in LDA   [] Wound Image Taken    Wound Care Consulted? Yes    Attending Nurse:  Alexander Bancroft, RN     Second RN/Staff Member:  YOLIE Ren

## 2023-07-26 NOTE — PROGRESS NOTES
Progress Note  Nephrology    Admit Date: 6/28/2023   LOS: 28 days     SUBJECTIVE:     Follow-up For:  ANTON / ATN    Interval History:  57 Y/O male with history of Paraplegia , with suprapubic catheter with frequent infection   Admitted to hospital for septic Right knee   And pr also had high BUN and CR and required a few dialysis   Currently BUN and CR improving  although pt still on Vent and intubated   Reqires for Trach and Peg tomorrow    Review of Systems:  Constitutional: No fever or chills  Respiratory: No cough or shortness of breath  Cardiovascular: No chest pain or palpitations  Gastrointestinal: No nausea or vomiting  Neurological: No confusion or weakness    OBJECTIVE:     Vital Signs Range (Last 24H):  Vitals:    07/26/23 1428   BP:    Pulse: 75   Resp: 20   Temp:        Temp:  [98.3 °F (36.8 °C)-99.1 °F (37.3 °C)]   Pulse:  []   Resp:  [16-33]   BP: (107-172)/(65-98)   SpO2:  [84 %-100 %]     I & O (Last 24H):  Intake/Output Summary (Last 24 hours) at 7/26/2023 1508  Last data filed at 7/26/2023 1029  Gross per 24 hour   Intake 3084.69 ml   Output 2625 ml   Net 459.69 ml       Physical Exam:  On vent   Head   normal   Neck   supple   Chest   symmetric   Lungs   clear , on Vent   Heart   RRR  Abdomen   soft , non tender   Ext   no edema     Laboratory Data:    Recent Labs   Lab 07/25/23  0205 07/26/23  0143    142   K 3.6 4.4   CO2 22 22   BUN 85.6* 66.9*   CREATININE 1.70* 1.21*   GLUCOSE 140* 147*   CALCIUM 8.9 8.8   PHOS 3.6  --      Lab Results   Component Value Date    .3 (H) 07/04/2023    CALCIUM 8.8 07/26/2023    CAION 1.16 07/22/2023    PHOS 3.6 07/25/2023     Lab Results   Component Value Date    IRON 35 (L) 12/30/2021    TIBC 284 12/30/2021    FERRITIN 24.99 12/30/2021       Medications:  Medication list was reviewed and changes noted under Assessment/Plan.    Diagnostic Results:    Reviewed most recent CT/US/Echo/MRI    ASSESSMENT/PLAN:   1   ANTON / ATN  2   prerenal   3   CKD  3-4  4   SHPT  5   hypernatremia  6   paraplegia  7   A Fib  8   DM 2  9   respiratory failure , on vent  for trach tomorrow  10  Right knee septic joint  11  moderate protein calorie malnutrition     Plan   BUN and CR improving   will continue with current iv fluids   N/S at 75 cc per hr

## 2023-07-26 NOTE — PLAN OF CARE
Problem: Diabetes Comorbidity  Goal: Blood Glucose Level Within Targeted Range  Outcome: Ongoing, Progressing     Problem: Skin Injury Risk Increased  Goal: Skin Health and Integrity  Outcome: Ongoing, Progressing     Problem: Impaired Wound Healing  Goal: Optimal Wound Healing  Outcome: Ongoing, Progressing     Problem: Pain Acute  Goal: Acceptable Pain Control and Functional Ability  Outcome: Ongoing, Progressing     Problem: Fall Injury Risk  Goal: Absence of Fall and Fall-Related Injury  Outcome: Ongoing, Progressing     Problem: Adult Inpatient Plan of Care  Goal: Patient-Specific Goal (Individualized)  Outcome: Ongoing, Not Progressing  Flowsheets (Taken 7/25/2023 1941)  Individualized Care Needs: unable to state     Problem: Infection  Goal: Absence of Infection Signs and Symptoms  Outcome: Ongoing, Not Progressing     Problem: Communication Impairment (Mechanical Ventilation, Invasive)  Goal: Effective Communication  Outcome: Ongoing, Not Progressing

## 2023-07-26 NOTE — PROGRESS NOTES
Infectious Diseases Progress Note  58-year-old male with past medical history of T-spine cord injury with paraplegia, diabetes, HTN, hepatitis-C, chronic right ankle wound/osteomyelitis, was on suppressive doxycycline, known to my service, seen by us initially at [facility] and has followed with us at the office for chronic osteomyelitis, is admitted to [facility] on 06/28/2023 presenting through the ED with complaints of pain and swelling to his right knee, apparently struck his knee.  He did also report prior remote right knee surgery.  He was evaluated and noted to have no fevers initially but a temperature of 100.2° today 6/29, no leukocytosis but with thrombocytosis of up to 531 today.  .2 and ESR >130.  He is anemic with low albumin.  Blood cultures have been negative.  CT scan of the right knee noted a 5 cm area of subcutaneous fluid collection in the prepatellar region.  There was aspiration in the ER with findings of purulent fluid and cultures showing group B Streptococcus.  He was seen by the orthopedic surgery team with findings of right prepatellar bursa abscess and is status post incision and drainage of right knee septic arthritis and right prepatellar bursa abscess today 6/29 with group B Streptococcus       Subjective:  Remains in ICU. Lying in bed in no acute distress. Remains intubated and on vent. Afebrile.     Review of Systems   Unable to perform ROS: Intubated       Review of patient's allergies indicates:   Allergen Reactions    Baclofen Itching and Anxiety       Past Medical History:   Diagnosis Date    Arthritis     Chronic ulcer of ankle 05/26/2022    Frequent UTI 07/02/2019    Generalized anxiety disorder 05/26/2022    Neurogenic bladder 05/26/2022    Osteomyelitis 05/26/2022    Presence of suprapubic catheter 05/26/2022    Pure hypercholesterolemia 05/26/2022    Retention of urine, unspecified 08/09/2019    Spinal cord injury at T1-T6 level  04/20/2018       Past Surgical History:   Procedure Laterality Date    INCISION AND DRAINAGE, LOWER EXTREMITY Right 6/29/2023    Procedure: INCISION AND DRAINAGE, LOWER EXTREMITY;  Surgeon: Prabhu Shen DO;  Location: [facility] OR;  Service: Orthopedics;  Laterality: Right;  supine bone foam wash stuff cultures    INSERTION OF INTRAMEDULLARY MARISA Right 8/10/2022    Procedure: INSERTION, INTRAMEDULLARY MARISA RIGHT TIBIA;  Surgeon: Jorge Orellana MD;  Location: [facility];  Service: Orthopedics;  Laterality: Right;       Social History     Socioeconomic History    Marital status:    Tobacco Use    Smoking status: Every Day     Types: Cigars, Cigarettes    Smokeless tobacco: Never   Substance and Sexual Activity    Alcohol use: Yes     Alcohol/week: 2.0 standard drinks     Types: 2 Cans of beer per week    Drug use: Not Currently    Sexual activity: Never         Scheduled Meds:   albuterol-ipratropium  3 mL Nebulization BID    amLODIPine  10 mg Per NG tube Daily    atorvastatin  20 mg Per NG tube Nightly    carvediloL  25 mg Per NG tube BID    cefTRIAXone (ROCEPHIN) IVPB  2 g Intravenous Q24H    cloNIDine 0.2 mg/24 hr td ptwk  1 patch Transdermal Q7 Days    doxycycline  100 mg Per NG tube Q12H    enoxaparin  30 mg Subcutaneous Q12H    famotidine (PF)  20 mg Intravenous Daily    fenofibrate  48 mg Per NG tube Daily    guaiFENesin 100 mg/5 ml  400 mg Per NG tube Q4H    miconazole NITRATE 2 %   Topical (Top) BID    oxybutynin  5 mg Per NG tube BID    senna-docusate 8.6-50 mg  2 tablet Per NG tube BID    sodium chloride 3%  4 mL Nebulization BID    sodium citrate-citric acid 500-334 mg/5 ml  30 mL Oral Once     Continuous Infusions:   sodium chloride 0.9% 75 mL/hr at 07/25/23 1936    clevidipine Stopped (07/21/23 1900)    dexmedeTOMIDine (Precedex) infusion (titrating) 0.2 mcg/kg/hr (07/18/23 2021)    NORepinephrine bitartrate-D5W Stopped (07/12/23 1039)    propofoL 50 mcg/kg/min (07/25/23 1646)     PRN Meds:sodium  "chloride, acetaminophen, etomidate, [] fentaNYL **FOLLOWED BY** fentaNYL, fentaNYL, hydrALAZINE, labetalol, oxyCODONE-acetaminophen, rocuronium, sodium chloride 0.9%, sodium chloride 0.9%    Objective:  BP (!) 164/90   Pulse 101   Temp 98.8 °F (37.1 °C) (Oral)   Resp 18   Ht 5' 9.69" (1.77 m)   Wt 86.2 kg (190 lb)   SpO2 96%   BMI 27.51 kg/m²     Physical Exam:   Physical Exam  Vitals reviewed.   HENT:      Head: Normocephalic.   Cardiovascular:      Rate and Rhythm: Normal rate and regular rhythm.   Pulmonary:      Effort: Pulmonary effort is normal. No respiratory distress.      Breath sounds: B/L BS coarse. No wheezing.      Comments: Intubated and on vent  Abdominal:      General: Bowel sounds are normal. There is no distension. L nare NGT     Palpations: Abdomen is soft.      Tenderness: There is no abdominal tenderness.   Genitourinary:     Comments: Suprapubic catheter  Musculoskeletal:      Cervical back: Normal range of motion.      Comments: Paraplegic   Skin:     Findings: No rash.      Comments: Wounds dressed. R knee with wound vac in place   Neurological:      Mental Status: He arouses to verbal stimuli, answers simple questions appropriately. Follows commands.   Psychiatric:         Mood and Affect: Mood normal.         Behavior: Behavior normal.      Imaging      Lab Review   Recent Results (from the past 24 hour(s))   BNP    Collection Time: 23  2:05 AM   Result Value Ref Range    Natriuretic Peptide 77.6 <=100.0 pg/mL   Renal Function Panel    Collection Time: 23  2:05 AM   Result Value Ref Range    Sodium Level 137 136 - 145 mmol/L    Potassium Level 3.6 3.5 - 5.1 mmol/L    Chloride 103 98 - 107 mmol/L    Carbon Dioxide 22 22 - 29 mmol/L    Glucose Level 140 (H) 74 - 100 mg/dL    Blood Urea Nitrogen 85.6 (H) 8.4 - 25.7 mg/dL    Creatinine 1.70 (H) 0.73 - 1.18 mg/dL    Calcium Level Total 8.9 8.4 - 10.2 mg/dL    Albumin Level 2.2 (L) 3.5 - 5.0 g/dL    Phosphorus Level 3.6 " 2.3 - 4.7 mg/dL    eGFR 46 mls/min/1.73/m2   CBC with Differential    Collection Time: 07/25/23  5:15 AM   Result Value Ref Range    WBC 9.58 4.50 - 11.50 x10(3)/mcL    RBC 4.05 (L) 4.70 - 6.10 x10(6)/mcL    Hgb 9.9 (L) 14.0 - 18.0 g/dL    Hct 31.5 (L) 42.0 - 52.0 %    MCV 77.8 (L) 80.0 - 94.0 fL    MCH 24.4 (L) 27.0 - 31.0 pg    MCHC 31.4 (L) 33.0 - 36.0 g/dL    RDW 23.2 (H) 11.5 - 17.0 %    Platelet 553 (H) 130 - 400 x10(3)/mcL    MPV 9.6 7.4 - 10.4 fL    Neut % 55.8 %    Lymph % 28.5 %    Mono % 11.4 %    Eos % 2.6 %    Basophil % 0.4 %    Lymph # 2.73 0.6 - 4.6 x10(3)/mcL    Neut # 5.35 2.1 - 9.2 x10(3)/mcL    Mono # 1.09 0.1 - 1.3 x10(3)/mcL    Eos # 0.25 0 - 0.9 x10(3)/mcL    Baso # 0.04 <=0.2 x10(3)/mcL    IG# 0.12 (H) 0 - 0.04 x10(3)/mcL    IG% 1.3 %    NRBC% 0.2 %       Assessment/Plan:  1. SIRS with fevers  2. Group B Streptococcus Right knee prepatellar abscess  3. Group B Streptococcus Right knee septic arthritis  4.  Anemia/reactive thrombocytosis  5.  Paraplegia secondary to T-spine cord injury  6. History of hepatitis-C   7. Chronic right ankle wound/osteomyelitis  8.  Diabetes    9.  Acute kidney injury  10. Acute hypoxic respiratory failure  11. Pulmonary edema with pleural effusions/ Pneumonitis      -Continue ceftriaxone with end date of 08/09 and maintain chronic suppressive doxycycline, following inflammatory markers  -Afebrile without leukocytosis   -Stool for c-diff PCR and toxin (-)  -7/4 and 7/8 blood cultures negative and sputum culture with moderate yeast.    -7/4 CT scan of the abdomen and pelvis and 7/3 chest x-ray results noted, likely dealing with pulmonary edema with pleural effusions and possibly superimposed infectious pneumonitis.   -6/28 right knee abscess culture with Group B Streptococcus  -Seen by Orthopedic surgery team s/p I&D of R prepatellar bursa abscess and septic R knee with cultures yielding Group G Streptococcus  -6/28 blood cultures negative  -Multiple comorbidities,  paraplegic with chronic pressure wounds, right ankle osteomyelitis with exposed bone on chronic suppressive doxycycline  -We will continue surgical site care per Orthopedic surgery team  -We will continue wound care  -He is to continue management of his hepatitis-C as outpatient  -Renal impairment noted, HD per Nephrology, started 7/4  -7/3 Renal ultrasound results noted  -Re-intubated on 7/21 followed by Bronchoscopy with mucus plugging noted. Continue vent management and ICU support per Intensivist  -Discussed with nursing staff.

## 2023-07-27 LAB
ALBUMIN SERPL-MCNC: 2.3 G/DL (ref 3.5–5)
ALBUMIN/GLOB SERPL: 0.5 RATIO (ref 1.1–2)
ALP SERPL-CCNC: 67 UNIT/L (ref 40–150)
ALT SERPL-CCNC: 13 UNIT/L (ref 0–55)
AST SERPL-CCNC: 17 UNIT/L (ref 5–34)
BILIRUBIN DIRECT+TOT PNL SERPL-MCNC: 0.3 MG/DL
BUN SERPL-MCNC: 31.7 MG/DL (ref 8.4–25.7)
CALCIUM SERPL-MCNC: 8.7 MG/DL (ref 8.4–10.2)
CHLORIDE SERPL-SCNC: 115 MMOL/L (ref 98–107)
CO2 SERPL-SCNC: 23 MMOL/L (ref 22–29)
CREAT SERPL-MCNC: 0.8 MG/DL (ref 0.73–1.18)
GFR SERPLBLD CREATININE-BSD FMLA CKD-EPI: >60 MLS/MIN/1.73/M2
GLOBULIN SER-MCNC: 4.6 GM/DL (ref 2.4–3.5)
GLUCOSE SERPL-MCNC: 113 MG/DL (ref 74–100)
MAGNESIUM SERPL-MCNC: 1.5 MG/DL (ref 1.6–2.6)
PHOSPHATE SERPL-MCNC: 3.6 MG/DL (ref 2.3–4.7)
POTASSIUM SERPL-SCNC: 4.1 MMOL/L (ref 3.5–5.1)
PROT SERPL-MCNC: 6.9 GM/DL (ref 6.4–8.3)
SODIUM SERPL-SCNC: 146 MMOL/L (ref 136–145)

## 2023-07-27 PROCEDURE — 93010 ELECTROCARDIOGRAM REPORT: CPT | Mod: ,,, | Performed by: INTERNAL MEDICINE

## 2023-07-27 PROCEDURE — 80053 COMPREHEN METABOLIC PANEL: CPT | Performed by: INTERNAL MEDICINE

## 2023-07-27 PROCEDURE — 99900035 HC TECH TIME PER 15 MIN (STAT)

## 2023-07-27 PROCEDURE — 94640 AIRWAY INHALATION TREATMENT: CPT

## 2023-07-27 PROCEDURE — 94003 VENT MGMT INPAT SUBQ DAY: CPT

## 2023-07-27 PROCEDURE — 27100171 HC OXYGEN HIGH FLOW UP TO 24 HOURS

## 2023-07-27 PROCEDURE — 93005 ELECTROCARDIOGRAM TRACING: CPT

## 2023-07-27 PROCEDURE — 63600175 PHARM REV CODE 636 W HCPCS

## 2023-07-27 PROCEDURE — 27200966 HC CLOSED SUCTION SYSTEM

## 2023-07-27 PROCEDURE — C9248 INJ, CLEVIDIPINE BUTYRATE: HCPCS | Mod: JZ,JG

## 2023-07-27 PROCEDURE — 20000000 HC ICU ROOM

## 2023-07-27 PROCEDURE — 25000003 PHARM REV CODE 250: Performed by: NURSE PRACTITIONER

## 2023-07-27 PROCEDURE — 63600175 PHARM REV CODE 636 W HCPCS: Performed by: INTERNAL MEDICINE

## 2023-07-27 PROCEDURE — 25000003 PHARM REV CODE 250

## 2023-07-27 PROCEDURE — 25000242 PHARM REV CODE 250 ALT 637 W/ HCPCS: Performed by: INTERNAL MEDICINE

## 2023-07-27 PROCEDURE — 25000003 PHARM REV CODE 250: Performed by: INTERNAL MEDICINE

## 2023-07-27 PROCEDURE — 99900031 HC PATIENT EDUCATION (STAT)

## 2023-07-27 PROCEDURE — 94799 UNLISTED PULMONARY SVC/PX: CPT

## 2023-07-27 PROCEDURE — 27000221 HC OXYGEN, UP TO 24 HOURS

## 2023-07-27 PROCEDURE — 84100 ASSAY OF PHOSPHORUS: CPT | Performed by: INTERNAL MEDICINE

## 2023-07-27 PROCEDURE — 94761 N-INVAS EAR/PLS OXIMETRY MLT: CPT

## 2023-07-27 PROCEDURE — 63600175 PHARM REV CODE 636 W HCPCS: Mod: JZ,JG

## 2023-07-27 PROCEDURE — 83735 ASSAY OF MAGNESIUM: CPT | Performed by: INTERNAL MEDICINE

## 2023-07-27 PROCEDURE — 99900026 HC AIRWAY MAINTENANCE (STAT)

## 2023-07-27 PROCEDURE — 93010 EKG 12-LEAD: ICD-10-PCS | Mod: ,,, | Performed by: INTERNAL MEDICINE

## 2023-07-27 RX ORDER — MAGNESIUM SULFATE 1 G/100ML
1 INJECTION INTRAVENOUS ONCE
Status: COMPLETED | OUTPATIENT
Start: 2023-07-27 | End: 2023-07-27

## 2023-07-27 RX ORDER — ONDANSETRON 2 MG/ML
4 INJECTION INTRAMUSCULAR; INTRAVENOUS ONCE AS NEEDED
Status: CANCELLED | OUTPATIENT
Start: 2023-07-27 | End: 2034-12-23

## 2023-07-27 RX ORDER — HYDROMORPHONE HYDROCHLORIDE 2 MG/ML
0.2 INJECTION, SOLUTION INTRAMUSCULAR; INTRAVENOUS; SUBCUTANEOUS EVERY 5 MIN PRN
Status: CANCELLED | OUTPATIENT
Start: 2023-07-27

## 2023-07-27 RX ADMIN — CLEVIPIDINE 2 MG/HR: 0.5 EMULSION INTRAVENOUS at 08:07

## 2023-07-27 RX ADMIN — PROPOFOL 50 MCG/KG/MIN: 10 INJECTION, EMULSION INTRAVENOUS at 04:07

## 2023-07-27 RX ADMIN — SENNOSIDES AND DOCUSATE SODIUM 2 TABLET: 50; 8.6 TABLET ORAL at 08:07

## 2023-07-27 RX ADMIN — PROPOFOL 40 MCG/KG/MIN: 10 INJECTION, EMULSION INTRAVENOUS at 11:07

## 2023-07-27 RX ADMIN — MICONAZOLE NITRATE 2 % TOPICAL POWDER: at 10:07

## 2023-07-27 RX ADMIN — PROPOFOL 40 MCG/KG/MIN: 10 INJECTION, EMULSION INTRAVENOUS at 08:07

## 2023-07-27 RX ADMIN — HYDRALAZINE HYDROCHLORIDE 10 MG: 20 INJECTION INTRAMUSCULAR; INTRAVENOUS at 04:07

## 2023-07-27 RX ADMIN — OXYBUTYNIN CHLORIDE 5 MG: 5 TABLET ORAL at 08:07

## 2023-07-27 RX ADMIN — DOXYCYCLINE HYCLATE 100 MG: 100 TABLET, COATED ORAL at 08:07

## 2023-07-27 RX ADMIN — ENOXAPARIN SODIUM 30 MG: 30 INJECTION SUBCUTANEOUS at 08:07

## 2023-07-27 RX ADMIN — CARVEDILOL 25 MG: 12.5 TABLET, FILM COATED ORAL at 01:07

## 2023-07-27 RX ADMIN — DEXMEDETOMIDINE HYDROCHLORIDE 0.6 MCG/KG/HR: 400 INJECTION INTRAVENOUS at 07:07

## 2023-07-27 RX ADMIN — CEFTRIAXONE SODIUM 2 G: 2 INJECTION, POWDER, FOR SOLUTION INTRAMUSCULAR; INTRAVENOUS at 11:07

## 2023-07-27 RX ADMIN — HYDRALAZINE HYDROCHLORIDE 10 MG: 20 INJECTION INTRAMUSCULAR; INTRAVENOUS at 01:07

## 2023-07-27 RX ADMIN — SODIUM CHLORIDE 30 MG/ML INHALATION SOLUTION 4 ML: 30 SOLUTION INHALANT at 08:07

## 2023-07-27 RX ADMIN — IPRATROPIUM BROMIDE AND ALBUTEROL SULFATE 3 ML: 2.5; .5 SOLUTION RESPIRATORY (INHALATION) at 08:07

## 2023-07-27 RX ADMIN — IPRATROPIUM BROMIDE AND ALBUTEROL SULFATE 3 ML: 2.5; .5 SOLUTION RESPIRATORY (INHALATION) at 07:07

## 2023-07-27 RX ADMIN — AMLODIPINE BESYLATE 10 MG: 5 TABLET ORAL at 01:07

## 2023-07-27 RX ADMIN — CLONIDINE 1 PATCH: 0.2 PATCH TRANSDERMAL at 10:07

## 2023-07-27 RX ADMIN — GUAIFENESIN 400 MG: 200 SOLUTION ORAL at 08:07

## 2023-07-27 RX ADMIN — MAGNESIUM SULFATE IN DEXTROSE 1 G: 10 INJECTION, SOLUTION INTRAVENOUS at 04:07

## 2023-07-27 RX ADMIN — SODIUM CHLORIDE 30 MG/ML INHALATION SOLUTION 4 ML: 30 SOLUTION INHALANT at 07:07

## 2023-07-27 RX ADMIN — MICONAZOLE NITRATE 2 % TOPICAL POWDER: at 08:07

## 2023-07-27 RX ADMIN — LABETALOL HYDROCHLORIDE 10 MG: 5 INJECTION, SOLUTION INTRAVENOUS at 12:07

## 2023-07-27 RX ADMIN — CARVEDILOL 25 MG: 12.5 TABLET, FILM COATED ORAL at 08:07

## 2023-07-27 RX ADMIN — PROPOFOL 50 MCG/KG/MIN: 10 INJECTION, EMULSION INTRAVENOUS at 01:07

## 2023-07-27 RX ADMIN — SODIUM CHLORIDE: 9 INJECTION, SOLUTION INTRAVENOUS at 05:07

## 2023-07-27 RX ADMIN — DEXMEDETOMIDINE HYDROCHLORIDE 0.8 MCG/KG/HR: 400 INJECTION INTRAVENOUS at 09:07

## 2023-07-27 RX ADMIN — HYDRALAZINE HYDROCHLORIDE 10 MG: 20 INJECTION INTRAMUSCULAR; INTRAVENOUS at 05:07

## 2023-07-27 RX ADMIN — GUAIFENESIN 400 MG: 200 SOLUTION ORAL at 05:07

## 2023-07-27 RX ADMIN — DEXMEDETOMIDINE HYDROCHLORIDE 0.8 MCG/KG/HR: 400 INJECTION INTRAVENOUS at 11:07

## 2023-07-27 RX ADMIN — CLEVIPIDINE 2 MG/HR: 0.5 EMULSION INTRAVENOUS at 05:07

## 2023-07-27 RX ADMIN — LABETALOL HYDROCHLORIDE 10 MG: 5 INJECTION, SOLUTION INTRAVENOUS at 04:07

## 2023-07-27 RX ADMIN — DEXMEDETOMIDINE HYDROCHLORIDE 0.8 MCG/KG/HR: 400 INJECTION INTRAVENOUS at 04:07

## 2023-07-27 RX ADMIN — SODIUM CHLORIDE: 9 INJECTION, SOLUTION INTRAVENOUS at 01:07

## 2023-07-27 RX ADMIN — ATORVASTATIN CALCIUM 20 MG: 10 TABLET, FILM COATED ORAL at 08:07

## 2023-07-27 RX ADMIN — FENTANYL CITRATE 50 MCG: 50 INJECTION, SOLUTION INTRAMUSCULAR; INTRAVENOUS at 04:07

## 2023-07-27 NOTE — PROGRESS NOTES
Pulmonary & Critical Care Medicine   Progress Note      Presenting History/HPI:  Bianca Khan is a 58 y.o. male with PMH of paraplegia 2/2 spinal cord injury (T1-T6), neurogenic bladder with suprapubic catheter, chronic right ankle osteomyelitis, DM II, HTN, who presented to the ED on 6/28/2023 with CC of right knee pain. Per chart review, CT of right knee revealed 5 cm area of subcutaneous fluid in prepatellar region which was aspirated in the ED; he was taken to the OR on 6/29/2023 by orthopedic surgery team and was found to have prepatellar bursa infection and septic arthritis. Wound culture 6/29/2023 positive for strep agalactiae. Orthopedic surgery team recommended right-sided above-knee amputation d/t chronically infected hardware in right lower extremity. On 7/4/2023, a RRT was called d/t acute respiratory distress that was not improving despite being placed on vapotherm at 35 L/min with FiO2 100%. At the time of examination, patient's initial GCS was 10 but progressively reduced to a GCS of 6. Shared decision with patient's wife was made to intubate patient for airway protection though ABGs were within normal limits. He was admitted to the ICU for close monitoring and further medical management.  The patient underwent incision and drainage of his right knee on 06/29.  He was admitted to the ICU and intubated for airway protection due to altered mental status on 7/4 with placement of hemodialysis catheter with initiation of hemodialysis.  Patient was subsequently extubated on 07/18 for the 2nd time.  He was reintubated on 07/21 secondary to worsening hypoxia and altered mental status with associated mucus plugging.  Status post bronchoscopy x3 since admission to the ICU for hypoxia associated with mucus plugging.  He has been off of hemodialysis for several days now.      Interval History:  -no major issues or changes overnight   -patient is scheduled to go to the OR today for tracheostomy and PEG tube  placement      Scheduled Medications:    albuterol-ipratropium  3 mL Nebulization BID    amLODIPine  10 mg Per NG tube Daily    atorvastatin  20 mg Per NG tube Nightly    carvediloL  25 mg Per NG tube BID    cefTRIAXone (ROCEPHIN) IVPB  2 g Intravenous Q24H    cloNIDine 0.2 mg/24 hr td ptwk  1 patch Transdermal Q7 Days    doxycycline  100 mg Per NG tube Q12H    enoxaparin  30 mg Subcutaneous Q12H    famotidine (PF)  20 mg Intravenous Daily    fenofibrate  48 mg Per NG tube Daily    guaiFENesin 100 mg/5 ml  400 mg Per NG tube Q4H    miconazole NITRATE 2 %   Topical (Top) BID    oxybutynin  5 mg Per NG tube BID    senna-docusate 8.6-50 mg  2 tablet Per NG tube BID    sodium chloride 3%  4 mL Nebulization BID    sodium citrate-citric acid 500-334 mg/5 ml  30 mL Oral Once       PRN Medications:   sodium chloride, acetaminophen, etomidate, [] fentaNYL **FOLLOWED BY** fentaNYL, fentaNYL, hydrALAZINE, labetalol, oxyCODONE-acetaminophen, rocuronium, sodium chloride 0.9%, sodium chloride 0.9%      Infusions:     sodium chloride 0.9% 75 mL/hr at 23 0157    clevidipine Stopped (23 1900)    dexmedeTOMIDine (Precedex) infusion (titrating) 0.6 mcg/kg/hr (23 0702)    NORepinephrine bitartrate-D5W Stopped (23 1039)    propofoL 50 mcg/kg/min (23 0420)         Fluid Balance:     Intake/Output Summary (Last 24 hours) at 2023 0908  Last data filed at 2023 0509  Gross per 24 hour   Intake 3261.25 ml   Output 2200 ml   Net 1061.25 ml         Vital Signs:   Vitals:    23 0750   BP:    Pulse: 73   Resp: 20   Temp:          Physical Exam  Vitals and nursing note reviewed.   Constitutional:       General: He is not in acute distress.     Appearance: Normal appearance. He is ill-appearing. He is not toxic-appearing.   HENT:      Head: Normocephalic and atraumatic.      Right Ear: External ear normal.      Left Ear: External ear normal.      Nose: Nose normal.      Mouth/Throat:       Pharynx: No posterior oropharyngeal erythema.      Comments: Unable to visualize posterior oropharynx secondary to endotracheal tube  Eyes:      General: No scleral icterus.     Conjunctiva/sclera: Conjunctivae normal.      Pupils: Pupils are equal, round, and reactive to light.   Neck:      Vascular: No carotid bruit.   Cardiovascular:      Rate and Rhythm: Normal rate and regular rhythm.      Pulses: Normal pulses.      Heart sounds: Normal heart sounds. No murmur heard.    No friction rub. No gallop.   Pulmonary:      Effort: Pulmonary effort is normal. No respiratory distress.      Breath sounds: Normal breath sounds. No wheezing, rhonchi or rales.      Comments: Intubated, on mechanical ventilation  Abdominal:      General: Abdomen is flat. Bowel sounds are normal. There is no distension.      Palpations: Abdomen is soft.      Tenderness: There is no abdominal tenderness. There is no guarding or rebound.   Musculoskeletal:         General: No swelling or deformity.      Cervical back: Neck supple. No rigidity or tenderness.   Skin:     General: Skin is warm and dry.      Capillary Refill: Capillary refill takes less than 2 seconds.      Findings: No erythema or rash.   Neurological:      Comments: Unable to fully assess neurologic status and orientation secondary to patient being intubated, sedated and nonverbal   Psychiatric:      Comments: Unable to fully assess as patient is intubated and nonverbal         Ventilator Settings  Vent Mode: A/C (07/27/23 0750)  Ventilator Initiated: Yes (07/21/23 1655)  Set Rate: 20 BPM (07/27/23 0750)  Vt Set: 450 mL (07/27/23 0750)  Pressure Support: 12 cmH20 (07/18/23 1031)  PEEP/CPAP: 5 cmH20 (07/27/23 0750)  Oxygen Concentration (%): 30 (07/27/23 0750)  Peak Airway Pressure: 16 cmH20 (07/27/23 0750)  Total Ve: 8.6 L/m (07/27/23 0750)  F/VT Ratio<105 (RSBI): (!) 46.3 (07/27/23 0750)      Laboratory Studies:   No results for input(s): PH, PCO2, PO2, HCO3, POCSATURATED, BE in  the last 24 hours.  No results for input(s): WBC, RBC, HGB, HCT, PLT, MCV, MCH, MCHC in the last 24 hours.  No results for input(s): GLUCOSE, NA, K, CL, CO2, BUN, CREATININE, MG in the last 24 hours.    Invalid input(s):  CALCIUM      Microbiology Data:   Microbiology Results (last 7 days)       Procedure Component Value Units Date/Time    Clostridium Diff Toxin, A & B, EIA [[id number]]  (Normal) Collected: 07/22/23 1459    Order Status: Completed Specimen: Stool Updated: 07/22/23 1533     Clostridium Difficile GDH Antigen Negative     Clostridium Difficile Toxin A/B Negative    C. Difficile By Rapid Pcr [[id number]] Collected: 07/22/23 1459    Order Status: Canceled Specimen: Stool Updated: 07/22/23 1459              Imaging:   X-Ray Chest 1 View  Narrative: EXAMINATION:  XR CHEST 1 VIEW    CLINICAL HISTORY:  hypoxemia, on mechanical ventilation, left mucus plug suspected;    TECHNIQUE:  Single view of the chest    COMPARISON:  07/22/2023    FINDINGS:  Patient remains intubated with no focal opacification.  Interval improvement of left pleural effusion.  Impression: As above.    Electronically signed by: Konrad Robertson  Date:    07/23/2023  Time:    12:35          Assessment and Plan    Assessment:  -prepatellar bursitis with septic arthritis of the right knee with Streptococcus agalactiae on culture status post incision and drainage on 06/29/2023 with wound VAC in place currently on suppressive antibiotics with doxycycline  -acute hypoxemic respiratory failure with associated pneumonia in addition to pulmonary edema on mechanical ventilation status post intubation x3  -acute kidney injury with chronic kidney disease stage 4 status post several sessions hemodialysis currently not receiving hemodialysis   -hypoactive delirium   -paroxysmal atrial fibrillation with permanent pacemaker in place   -history of paraplegia secondary to spinal cord injury at level T1 with associated neurogenic bladder and chronic right ankle  osteomyelitis  -diabetes mellitus type 2   -hypertension      Plan:  -titrate mechanical ventilation for ARDS net protocol   -supplement oxygen to maintain saturation 94-96%   -scheduled to go to the OR today for tracheostomy and PEG tube placement   -continue wound VAC per orthopedic surgery recommendations and Infectious Disease recommendations with suppressive antibiotics to finish on 08/09, on doxycycline  -will most likely dispo to LTAC after tracheostomy and PEG tube placement  -resume tube feeds and free water flushes status post PEG tube placement      DVT ppx/tx with lovenox  GI ppx with Pepcid  Keep HOB elevated > 30*      The patient remains at high risk of decompensation  and will remain in ICU level care     34 min of critical care time was spent reviewing the patient's chart including medications, radiographs, labs, pertinent cultures and pathology data, other consultant notes/recomendations as well as titration of vasopressors, adjustment of mechanical ventilatory or NIPPV support, as well as discussion of goals of care with nursing staff, respiratory therapy at the bedside and with family at the bedside/via phone.        Luis Mcclure MD  7/27/2023  Pulmonology/Critical Care

## 2023-07-27 NOTE — PROGRESS NOTES
I am here to do the patient's trach.  ENT has signed consent.  General surgery has signed consent for the feeding tube.  However anesthesia did not obtain consent and therefore will not proceed with the case.  Attempts to reach the patient's wife have been unsuccessful and have included me calling, the nurse calling.  Me texting and contacting another relative his name is on the chart.  Unfortunately this time we are not able to proceed with the patient's procedure that I called on Tuesday to schedule and this was the earliest availability for the OR.

## 2023-07-27 NOTE — PROGRESS NOTES
Progress Note  Nephrology    Admit Date: 6/28/2023   LOS: 29 days     SUBJECTIVE:     Follow-up For:  ANTON / ATN    Interval History:  59 Y/O male with history of paraplegia and suprapubic cath with frequent infection admitted to hospital for Right knee infection   Pt also developed respiratory failure and was placed on vent   Patient had a few hemodialysis but BUN and creatinin slowly improved and currently creatinin is 1.2 , and urine out put is good       Review of Systems:  Constitutional: No fever or chills  Respiratory: No cough or shortness of breath  Cardiovascular: No chest pain or palpitations  Gastrointestinal: No nausea or vomiting  Neurological: No confusion or weakness    OBJECTIVE:     Vital Signs Range (Last 24H):  Vitals:    07/27/23 1336   BP:    Pulse: 61   Resp: 20   Temp:        Temp:  [97.9 °F (36.6 °C)-98.6 °F (37 °C)]   Pulse:  [60-90]   Resp:  [17-24]   BP: (101-196)/()   SpO2:  [90 %-100 %]     I & O (Last 24H):  Intake/Output Summary (Last 24 hours) at 7/27/2023 1437  Last data filed at 7/27/2023 1200  Gross per 24 hour   Intake 3261.25 ml   Output 2625 ml   Net 636.25 ml       Physical Exam:  On vent and intubated and sedated   Head   normal   Neck   supple   Chest   symmetric   Lungs   clear   Heart   RRR  Abdomen   soft , non tender   Ext   no edema   Urine out put 2600 cc for past 24 hrs    Laboratory Data:    Recent Labs   Lab 07/25/23  0205 07/26/23  0143    142   K 3.6 4.4   CO2 22 22   BUN 85.6* 66.9*   CREATININE 1.70* 1.21*   GLUCOSE 140* 147*   CALCIUM 8.9 8.8   PHOS 3.6  --      Lab Results   Component Value Date    .3 (H) 07/04/2023    CALCIUM 8.8 07/26/2023    CAION 1.16 07/22/2023    PHOS 3.6 07/25/2023     Lab Results   Component Value Date    IRON 35 (L) 12/30/2021    TIBC 284 12/30/2021    FERRITIN 24.99 12/30/2021       Medications:  Medication list was reviewed and changes noted under Assessment/Plan.    Diagnostic Results:    Reviewed most recent  CT/US/Echo/MRI    ASSESSMENT/PLAN:   1   ANTON / ATN   improved  2   CKD 3  3   SHPT  4   PreRenal   5   Hypernatremia    improved  6   Paraplegia  7   A Fib  8   DM 2  9   respiratory failure  on vent   10  Right knee infection   11  Moderate protein calorie malnutrition     Plan    for trach and peg today               Labs  in AM

## 2023-07-27 NOTE — PROGRESS NOTES
Acute Care Surgery   Progress Note  Admit Date: 2023  HD#29  POD#28 Days Post-Op    Subjective:   Interval history:  AFVSS  On ventilator, ACVC/450/20/5/30%  NPO  Sedated with propofol  UOP adequate at 2.3L (1.2 mL/kg/h)    Home Meds:  Current Outpatient Medications   Medication Instructions    amitriptyline (ELAVIL) 50 mg, Oral, Nightly    amLODIPine (NORVASC) 10 MG tablet 1 tablet    atorvastatin (LIPITOR) 20 mg, Oral, Nightly    busPIRone (BUSPAR) 5 MG Tab 1 tablet    carvediloL (COREG) 12.5 mg, Oral    cyclobenzaprine (FLEXERIL) 10 mg, Oral, 2 times daily PRN    doxycycline (VIBRAMYCIN) 100 mg, Oral, Daily    fenofibrate 160 mg, Oral    FLUoxetine 20 mg, Oral    gabapentin (NEURONTIN) 300 MG capsule 1 capsule    lisinopriL (PRINIVIL,ZESTRIL) 10 mg, Oral, Nightly    lisinopriL 10 mg, Oral, Daily    LORazepam (ATIVAN) 1 mg, Oral, 2 times daily    melatonin (MELATIN) 3 mg tablet TAKE TWO TABLETS BY MOUTH EVERY NIGHT AT BEDTIME AS NEEDED FOR INSOMNIA.    meloxicam (MOBIC) 15 mg, Oral, Daily    metFORMIN (GLUCOPHAGE) 500 MG tablet SMARTSI Tablet(s) By Mouth Morning-Evening    oxybutynin (DITROPAN) 5 mg, Oral, 2 times daily    oxyCODONE-acetaminophen (PERCOCET)  mg per tablet 1 tablet, Oral, Every 6 hours PRN    pantoprazole (PROTONIX) 40 MG tablet 1 tablet    temazepam (RESTORIL) 30 mg, Oral, Nightly    traZODone (DESYREL) 50 mg, Oral, Nightly    XARELTO 20 mg Tab SMARTSI Tablet(s) By Mouth Every Evening      Scheduled Meds:   albuterol-ipratropium  3 mL Nebulization BID    amLODIPine  10 mg Per NG tube Daily    atorvastatin  20 mg Per NG tube Nightly    carvediloL  25 mg Per NG tube BID    cefTRIAXone (ROCEPHIN) IVPB  2 g Intravenous Q24H    cloNIDine 0.2 mg/24 hr td ptwk  1 patch Transdermal Q7 Days    doxycycline  100 mg Per NG tube Q12H    enoxaparin  30 mg Subcutaneous Q12H    famotidine (PF)  20 mg Intravenous Daily    fenofibrate  48 mg Per NG tube Daily    guaiFENesin 100 mg/5 ml  400 mg  "Per NG tube Q4H    miconazole NITRATE 2 %   Topical (Top) BID    oxybutynin  5 mg Per NG tube BID    senna-docusate 8.6-50 mg  2 tablet Per NG tube BID    sodium chloride 3%  4 mL Nebulization BID    sodium citrate-citric acid 500-334 mg/5 ml  30 mL Oral Once     Continuous Infusions:   sodium chloride 0.9% 75 mL/hr at 23 0157    clevidipine Stopped (23 1900)    dexmedeTOMIDine (Precedex) infusion (titrating) 0.2 mcg/kg/hr (23)    NORepinephrine bitartrate-D5W Stopped (23 1039)    propofoL 50 mcg/kg/min (23 042)     PRN Meds:sodium chloride, acetaminophen, etomidate, [] fentaNYL **FOLLOWED BY** fentaNYL, fentaNYL, hydrALAZINE, labetalol, oxyCODONE-acetaminophen, rocuronium, sodium chloride 0.9%, sodium chloride 0.9%     Objective:     VITAL SIGNS: 24 HR MIN & MAX LAST   Temp  Min: 97.9 °F (36.6 °C)  Max: 98.6 °F (37 °C)  98.6 °F (37 °C)   BP  Min: 107/66  Max: 195/90  (!) 168/93    Pulse  Min: 61  Max: 95  68    Resp  Min: 16  Max: 25  20    SpO2  Min: 90 %  Max: 100 %  100 %      HT: 5' 9.69" (177 cm)  WT: 86.2 kg (190 lb)  BMI: 27.5     Intake/output:  Intake/Output - Last 3 Shifts          07 06 07 0659    I.V. (mL/kg) 2114.6 (24.5) 2296.3 (26.6)    NG/GT 1016 965    IV Piggyback 228.2     Total Intake(mL/kg) 3358.8 (39) 3261.3 (37.8)    Urine (mL/kg/hr) 2850 (1.4) 2550 (1.2)    Other 0 0    Stool 150 50    Total Output 3000 2600    Net +358.8 +661.3                  Intake/Output Summary (Last 24 hours) at 2023 0637  Last data filed at 2023 0509  Gross per 24 hour   Intake 3261.25 ml   Output 2600 ml   Net 661.25 ml             Lines/drains/airway:       Peripheral IV - Single Lumen 23 0600 18 G Anterior;Left Forearm (Active)   Site Assessment Clean;Dry;Intact;No redness;No swelling;No warmth;No drainage 23 0400   Extremity Assessment Distal to IV No abnormal discoloration 23 0400   Line Status Saline locked " 07/26/23 0400   Dressing Status Clean;Dry;Intact 07/26/23 0400   Dressing Intervention Integrity maintained 07/26/23 0400   Number of days: 22            Midline Catheter Insertion/Assessment  - Single Lumen 07/22/23 1603 Left basilic vein (medial side of arm) other (see comments) (Active)   $ Midline Charges (Upon insertion) Bedside Insertion Performed Pt > 3 Years Old;Midline Catheter (Supply) 07/22/23 1600   Site Assessment Clean;Dry;Intact;No redness;No swelling;No warmth 07/26/23 0400   IV Device Securement catheter securement device 07/26/23 0400   Line Status Infusing 07/26/23 0400   Dressing Type Central line dressing 07/26/23 0400   Dressing Status Clean;Dry;Intact 07/26/23 0400   Dressing Intervention Integrity maintained 07/26/23 0400   Dressing Change Due 08/02/23 07/26/23 0400   Number of days: 3            NG/OG Tube 07/21/23 1000 Falls sump 16 Fr. Left nostril (Active)   Placement Check placement verified by aspirate characteristics 07/26/23 0400   Distal Tube Length (cm) 57 07/25/23 2000   Tolerance no signs/symptoms of discomfort 07/26/23 0400   Securement secured to nostril center 07/26/23 0400   Clamp Status/Tolerance unclamped 07/26/23 0400   Suction Setting/Drainage Method suction at the bedside 07/26/23 0400   Insertion Site Appearance no redness, warmth, tenderness, skin breakdown, drainage 07/26/23 0400   Flush/Irrigation flushed w/;water;no resistance met 07/26/23 0400   Feeding Type continuous;by pump 07/26/23 0400   Feeding Action feeding continued 07/26/23 0400   Current Rate (mL/hr) 45 mL/hr 07/26/23 0400   Goal Rate (mL/hr) 45 mL/hr 07/26/23 0400   Intake (mL) 309 mL 07/26/23 0508   Water Bolus (mL) 100 mL 07/26/23 0508   Rate Formula Tube Feeding (mL/hr) 70 mL/hr 07/24/23 0000   Formula Name Novasource Renal 07/26/23 0400   Intake (mL) - Formula Tube Feeding 358 07/24/23 0600   Number of days: 4            Rectal Tube 07/22/23 0400 rectal tube w/ balloon (indicate number of mLs)  (Active)   Balloon Inflation Volume (mL) 45 07/25/23 1600   Reposition drainage bags for BMS & Stover on opposite sides of bed 07/26/23 0400   Outcome stool evacuated 07/25/23 1600   Stool Color Brown 07/26/23 0400   Insertion Site Appearance no redness, warmth, tenderness, skin breakdown, drainage 07/26/23 0400   Flush/Irrigation flushed w/;water 07/25/23 1600   Intake (mL) 45 mL 07/25/23 2207   Rectal Tube Output 150 mL 07/26/23 0508   Number of days: 4            Suprapubic Catheter 07/06/23 1544 100% silicone 22 Fr. (Active)   Clamp Status unclamped 07/26/23 0400   Dressing no dressing 07/26/23 0400   Characteristics no redness;no warmth;no drainage;no tenderness;no swelling 07/26/23 0400   Urine Color Yellow 07/26/23 0400   Collection Container Standard drainage bag 07/26/23 0400   Securement secured to upper leg w/ adhesive device 07/25/23 1600   Output (mL) 150 mL 07/26/23 0508   Number of days: 19       Physical examination:  Gen: NAD  HEENT: NCAT. NGT in place.  CV: RR  Resp: On ventilator, ACVC/450/20/5/30%  Abd: S/NT/ND, scar from previous PEG  Neuro: sedated on propofol    Labs:  Renal:  Recent Labs     07/25/23  0205 07/26/23  0143   BUN 85.6* 66.9*   CREATININE 1.70* 1.21*       No results for input(s): LACTIC in the last 72 hours.  FENGI:  Recent Labs     07/25/23  0205 07/26/23  0143    142   K 3.6 4.4   CO2 22 22   CALCIUM 8.9 8.8   PHOS 3.6  --    ALBUMIN 2.2* 2.3*   BILITOT  --  0.2   AST  --  19   ALKPHOS  --  88   ALT  --  14       Heme:  Recent Labs     07/25/23  0515 07/26/23 0143   HGB 9.9* 9.3*   HCT 31.5* 29.3*   * 583*       ID:  Recent Labs     07/25/23  0515 07/26/23 0143   WBC 9.58 10.05       CBG:  Recent Labs     07/25/23  0205 07/26/23  0143   GLUCOSE 140* 147*        Cardiovascular:  Recent Labs   Lab 07/22/23  0208 07/25/23  0205   .3* 77.6       I have reviewed all pertinent lab results within the past 24 hours.    Imaging:  X-Ray Chest 1 View   Final Result       As above.         Electronically signed by: Konrad Robertson   Date:    07/23/2023   Time:    12:35      X-Ray Chest 1 View   Final Result      As above.  No adverse interval change.         Electronically signed by: Konrad Robertson   Date:    07/22/2023   Time:    07:57      XR Gastric tube check, non-radiologist performed   Final Result      As above.         Electronically signed by: Mikal White   Date:    07/21/2023   Time:    13:04      X-Ray Chest 1 View   Final Result      As above.         Electronically signed by: Konrad Robertson   Date:    07/21/2023   Time:    07:09      X-Ray Chest 1 View   Final Result      Interval extubation.  Improved aeration of the left lung base with some persistent bibasilar opacities and small effusions suspected.         Electronically signed by: Mikal White   Date:    07/20/2023   Time:    08:52      X-Ray Chest 1 View   Final Result      Stable chest         Electronically signed by: Dipak Multani MD   Date:    07/16/2023   Time:    10:39      X-Ray Chest 1 View   Final Result      No change since previous         Electronically signed by: Gloria Gomez   Date:    07/15/2023   Time:    13:01      X-Ray Chest 1 View   Final Result      No significant interval change.         Electronically signed by: Mikal White   Date:    07/15/2023   Time:    08:32      X-Ray Chest 1 View   Final Result      Left-sided central line tip overlies the mid SVC.         Electronically signed by: Mikal White   Date:    07/14/2023   Time:    14:04      X-Ray Chest 1 View   Final Result      Little overall change since 07/11/2023.         Electronically signed by: Gustavo Cassidy   Date:    07/14/2023   Time:    07:00      X-Ray Chest 1 View   Final Result      Endotracheal tube partially obscured by fusion hardware but appears to terminate an estimated 5-10 mm above the marija.         Electronically signed by: Tracy Zamudio   Date:    07/11/2023   Time:    15:28      X-Ray Chest 1 View    Final Result      Mild overall worsening of bilateral mid/lower lung opacities.         Electronically signed by: Gustavo Cassidy   Date:    07/10/2023   Time:    16:03      X-Ray Chest 1 View   Final Result      Lines and tubes as above without significant interval change.         Electronically signed by: Wyatt Jordan MD   Date:    07/08/2023   Time:    14:10      X-Ray Chest 1 View   Final Result      Progressive interstitial opacities may be related to interstitial edema, pneumonia or ARDS.         Electronically signed by: Tracy Zamudio   Date:    07/06/2023   Time:    19:25      X-Ray Chest 1 View   Final Result      Overall improving aeration with small bilateral pleural effusions.         Electronically signed by: Lanette Waller   Date:    07/06/2023   Time:    06:15      X-Ray Chest 1 View   Final Result      Bilateral lower lung opacities with mild worsening of right lung aeration since yesterday.         Electronically signed by: Gustavo Cassidy   Date:    07/05/2023   Time:    06:06      XR Gastric tube check, non-radiologist performed   Final Result      X-Ray Chest 1 View   Final Result      Placement of line with no acute complications seen.         Electronically signed by: Ankit Davila MD   Date:    07/04/2023   Time:    09:55      CT Abdomen Pelvis  Without Contrast   Final Result   Impression:      1. Bilateral small-to-moderate pleural effusions are seen with adjacent compressive atelectasis versus consolidation. This has increased on the left and new on the right. The prior study demonstrates trace left pleural effusion.      2. Ill-defined confluent airspace opacities are seen in the right middle lobe, lingula and right lower lobe. These findings are new since the prior examination. This is consistent with infectious pneumonia. Follow-up as clinically indicated.      3. There is mild ascites, new since the prior examination.      4. Details and other findings as discussed above.      I  concur with the preliminary report above.         Electronically signed by: Gloria Gomez   Date:    07/04/2023   Time:    08:02      X-Ray Chest 1 View   Final Result      Good position of endotracheal tube.         Electronically signed by: Ankit Davila MD   Date:    07/04/2023   Time:    09:58      X-Ray Chest 1 View   Final Result      Bilateral lung opacities, mildly greater on the right.  Both pulmonary edema and pneumonia possible.         Electronically signed by: Gustavo Cassidy   Date:    07/03/2023   Time:    20:23      US Retroperitoneal Limited   Final Result      No significant sonographic abnormality of the kidneys.         Electronically signed by: Mikal White   Date:    07/03/2023   Time:    09:47      CT Abdomen Pelvis  Without Contrast   Final Result      No overt acute process of the abdomen or pelvis with other secondary chronic secondary findings.         Electronically signed by: Wyatt Jordan MD   Date:    06/30/2023   Time:    20:34      CT Knee W W/O Contrast Right   Final Result      Mildly limited assessment.  5 cm area of subcutaneous fluid in the prepatellar region may have thin peripheral enhancement.  Fluid collection is possible.      Subcutaneous edema in the calf.         Electronically signed by: Gustavo Cassidy   Date:    06/28/2023   Time:    18:37      X-Ray Tibia Fibula 2 View Right   Final Result      Posttraumatic and postsurgical changes at the femur and lower leg.  There is chronic deformity at the distal femur with heterogeneous sclerosis and lucency superimposed on background bony demineralization.  No old imaging of this region available for comparison.  This makes it difficult to evaluate for acute superimposed lytic change.      Postsurgical and posttraumatic change at the tibia and fibula with bony demineralization.  No appreciable acute osseous abnormality.         Electronically signed by: Tracy Zamudio   Date:    06/28/2023   Time:    18:14      X-Ray Femur 2  View Right   Final Result      Posttraumatic and postsurgical changes at the femur and lower leg.  There is chronic deformity at the distal femur with heterogeneous sclerosis and lucency superimposed on background bony demineralization.  No old imaging of this region available for comparison.  This makes it difficult to evaluate for acute superimposed lytic change.      Postsurgical and posttraumatic change at the tibia and fibula with bony demineralization.  No appreciable acute osseous abnormality.         Electronically signed by: Tracy Zamudio   Date:    06/28/2023   Time:    18:14      X-Ray Knee 3 View Right   Final Result      1. No acute bony abnormality.   2. Soft tissue swelling around the knee.         Electronically signed by: Lanette Waller   Date:    06/28/2023   Time:    14:18           I have reviewed all pertinent imaging results/findings within the past 24 hours.    Micro/Path/Other:  Microbiology Results (last 7 days)       Procedure Component Value Units Date/Time    Clostridium Diff Toxin, A & B, EIA [[id number]]  (Normal) Collected: 07/22/23 1459    Order Status: Completed Specimen: Stool Updated: 07/22/23 1533     Clostridium Difficile GDH Antigen Negative     Clostridium Difficile Toxin A/B Negative    C. Difficile By Rapid Pcr [[id number]] Collected: 07/22/23 1459    Order Status: Canceled Specimen: Stool Updated: 07/22/23 1459           Specimen (168h ago, onward)      None             Assessment & Plan:   Bianca Khan is a 58 y.o. M with above noted history admitted to the ICU with septic joint, now with respiratory decline and inability to wean from ventilator. ACS consulted for PEG placement in conjunction with ENT trach.      - Trach/PEG today  - NPO  - Pt is booked and consented  - Remainder of POC per primary    Brooke Rico MD  [employer] General Surgery, PGY-2

## 2023-07-27 NOTE — PROGRESS NOTES
Originally told by surgery scheduling that trach would go around 6 pm today;however, general surgery placing PEG and offering use of their room. I am here and ready but on hold awaiting trauma call clearance. Attempted to call wife to let her know we are going earlier than anticipated. She is not answering. Spoke to pt's nurse who said wife going out of town today but she will also attempt to call and notify her. I feel it important to notify family of surgery.Consent was obtained and witnessed yesterday.

## 2023-07-27 NOTE — NURSING
Nurses Note -- 4 Eyes      7/26/2023   10:31 PM      Skin assessed during: Q Shift Change      [] No Altered Skin Integrity Present    [x]Prevention Measures Documented      [x] Yes- Altered Skin Integrity Present or Discovered   [] LDA Added if Not in Epic (Describe Wound)   [] New Altered Skin Integrity was Present on Admit and Documented in LDA   [] Wound Image Taken    Wound Care Consulted? Yes    Attending Nurse:  Alexander Bancroft, RN     Second RN/Staff Member:  YOLIE Ren

## 2023-07-27 NOTE — PROGRESS NOTES
Inpatient Nutrition Assessment    Admit Date: 6/28/2023   Total duration of encounter: 29 days     Nutrition Recommendation/Prescription     Restart TF when appropriate.  Tube feeding recommendation:   Novasource Renal goal rate 45 ml/hr to provide  1800 kcal/d (94% est needs, 121% with meds)  81 g protein/d (62% est needs)  648 ml free water/d   (calculations based on estimated 20 hr/d run time)     Continue with Malachi (provides 90 kcal, 2.5 g protein per serving) BID.    Communication of Recommendations: reviewed with nurse    Nutrition Assessment     Malnutrition Assessment/Nutrition-Focused Physical Exam    Malnutrition Context: chronic illness  Malnutrition Level:  (does not meet criteria)  Energy Intake (Malnutrition):  (unable to obtain)  Weight Loss (Malnutrition):  (unable to obtain)  Subcutaneous Fat (Malnutrition):  (does not meet criteria)           Muscle Mass (Malnutrition):  (does not meet criteria)                          Fluid Accumulation (Malnutrition):  (does not meet criteria)        A minimum of two characteristics is recommended for diagnosis of either severe or non-severe malnutrition.    Chart Review    Reason Seen: continuous nutrition monitoring and follow-up    Malnutrition Screening Tool Results   Have you recently lost weight without trying?: Unsure  Have you been eating poorly because of a decreased appetite?: Yes   MST Score: 3     Diagnosis:  Bilateral Pulmonary Infiltrates   Uremic encephalopathy 2/2 acute renal failure  Acute renal failure  Hyponatremia  Septic arthritis    Relevant Medical History: paraplegia 2/2 spinal cord injury (T1-T6), neurogenic bladder with suprapubic catheter, chronic right ankle osteomyelitis, DM II, HTN    Nutrition-Related Medications:   NS @ 75ml/hr, diprivan, senna-docusate    Calorie Containing IV Medications: Diprivan @ 20 ml/hr (provides 530 kcal/d)    Nutrition-Related Labs:  7/5 Na 128, K 5.3, Glu 93, BUN 39.8, Crea 7.07, Phos 7.1, GFR 8  7/11  Na 133, BUN 75.8, Crea 4.3, Glu 121, Phos 6.5  7/14 .2, Crea 4.09, Glu 118, Phos 5.9  7/18 BUN 59.5, Crea 2.34, Glu 119, Phos 5.3  7/20 Na 148, BUN 71.2, Crea 2.4, Glu 146  7/24 BUN 86, Crea 2.17, Glu 152, Phos 5.2  7/26 Cl 111, Glu 147    Diet/PN Order: Diet NPO Except for: Sips with Medication  Oral Supplement Order: none  Tube Feeding Order:  Impact Peptide 1.5 (see below for calculation)  Appetite/Oral Intake: not applicable/not applicable  Factors Affecting Nutritional Intake: respiratory status  Food/Lutheran/Cultural Preferences: unable to obtain  Food Allergies: none reported    Skin Integrity: wound, drain/device(s)  Wound(s):      Altered Skin Integrity 06/28/23 2230 medial Sacral spine #1-Tissue loss description: Partial thickness       Altered Skin Integrity 06/28/23 2230 Right posterior Buttocks #2-Tissue loss description: Not applicable       Altered Skin Integrity 06/28/23 2230 Left posterior Buttocks #4-Tissue loss description: Not applicable       Altered Skin Integrity 06/28/23 2230 Right lateral Ankle #6-Tissue loss description: Partial thickness     Comments    7/5/23: Discussed with RN. Will provide tube feeding recommendations for when appropriate to start tube feeding. Receiving kcal from meds. Noted K and Phos, will need renal formula at this time. Will add supplemental protein and Malachi for wound healing.     7/11/23: TF continues, tolerated @ goal rate.     7/14/23: TF continues, tolerated per RN.     7/18/23: TF continues, tolerated per RN. Possible plans for vent weaning today. Still receiving kcal from meds. Noted increase in diprivan. If remains at increased rate, will adjust TF rate to not overfeed.    7/20/23: Tf no longer running. NG pulled out by pt. Now on BIPAP and not able to replace tube. Discussed with RN, can wait a few days, but will eventually need to consider reinserting NG vs. PN if not able to insert NG.     7/24/23: Pt now reintubated. NG in place. TF running.  "Now overfeeding pt and Phos elevated. Will continue with Malachi but change to renal formula. Plans for trach this week per RN. Noted updated TF recs will not meet est protein needs. Will continue at this time and hopefully once trach placed can increase TF rate since diprivan hopefully weaned.    23: TF on hold for trach and PEG today. Noted now not placed per notes. Can continue with previous TF. Once placed, will update TF to continue to meet est needs.    Anthropometrics    Height: 5' 9.69" (177 cm) Height Method: Stated  Last Weight: 86.2 kg (190 lb) (23 1114) Weight Method: Standard Scale (stated)  BMI (Calculated): 27.5  BMI Classification: overweight (BMI 25-29.9)        Ideal Body Weight (IBW), Male: 164.14 lb     % Ideal Body Weight, Male (lb): 115.75 %                          Usual Weight Provided By: unable to obtain usual weight    Wt Readings from Last 5 Encounters:   23 86.2 kg (190 lb)   22 86.2 kg (190 lb)   22 86.2 kg (190 lb 0.6 oz)   22 86.2 kg (189 lb 15.9 oz)     Weight Change(s) Since Admission:  Admit Weight: 86.2 kg (190 lb) (23 1346)  23: no new  23: no new  23: no new  23: no new    Estimated Needs    Weight Used For Calorie Calculations: 86.2 kg (190 lb 0.6 oz)  Energy Calorie Requirements (kcal): 1922kcal  Energy Need Method: [place]  Weight Used For Protein Calculations: 86.2 kg (190 lb 0.6 oz)  Protein Requirements: 130gm (1.5g/kg)  Fluid Requirements (mL): 1000ml + urinary output  Temp (24hrs), Av.4 °F (36.9 °C), Min:97.9 °F (36.6 °C), Max:98.6 °F (37 °C)    Vtot (L/Min) for [place] Equation Calculation: 9.3    Enteral Nutrition    (On hold)  Formula: Novasource Renal  Rate/Volume: 45ml/hr  Water Flushes: 50ml/hr q4hr  Additives/Modulars: Malachi  Route: nasogastric tube  Method: continuous  Total Nutrition Provided by Tube Feeding, Additives, and Flushes:  Calories Provided  1800 kcal/d, 94% needs   Protein Provided  " 81 g/d, 62% needs   Fluid Provided  648 ml/d, N/A% needs   Continuous feeding calculations based on estimated 20 hr/d run time unless otherwise stated.     Parenteral Nutrition    Patient not receiving parenteral nutrition support at this time.    Evaluation of Received Nutrient Intake    Calories: not meeting estimated needs  Protein: not meeting estimated needs    Patient Education    Not applicable.    Nutrition Diagnosis     PES: Inadequate oral intake related to acute illness as evidenced by NPO post extubation/reintubation. (continues)    Interventions/Goals     Intervention(s): collaboration with other providers  Goal: Meet greater than 75% of nutritional needs by follow-up. (goal progressing)    Monitoring & Evaluation     Dietitian will monitor energy intake.  Nutrition Risk/Follow-Up: high (follow-up in 1-4 days)   Please consult if re-assessment needed sooner.

## 2023-07-27 NOTE — PLAN OF CARE
Problem: Skin Injury Risk Increased  Goal: Skin Health and Integrity  Outcome: Ongoing, Progressing     Problem: Impaired Wound Healing  Goal: Optimal Wound Healing  Outcome: Ongoing, Progressing     Problem: Pain Acute  Goal: Acceptable Pain Control and Functional Ability  Outcome: Ongoing, Progressing     Problem: Infection  Goal: Absence of Infection Signs and Symptoms  Outcome: Ongoing, Progressing     Problem: Fall Injury Risk  Goal: Absence of Fall and Fall-Related Injury  Outcome: Ongoing, Progressing     Problem: Adult Inpatient Plan of Care  Goal: Patient-Specific Goal (Individualized)  Outcome: Ongoing, Not Progressing     Problem: Communication Impairment (Mechanical Ventilation, Invasive)  Goal: Effective Communication  Outcome: Ongoing, Not Progressing

## 2023-07-27 NOTE — PROGRESS NOTES
Infectious Diseases Progress Note  58-year-old male with past medical history of T-spine cord injury with paraplegia, diabetes, HTN, hepatitis-C, chronic right ankle wound/osteomyelitis, was on suppressive doxycycline, known to my service, seen by us initially at [facility] and has followed with us at the office for chronic osteomyelitis, is admitted to [facility] on 06/28/2023 presenting through the ED with complaints of pain and swelling to his right knee, apparently struck his knee.  He did also report prior remote right knee surgery.  He was evaluated and noted to have no fevers initially but a temperature of 100.2° today 6/29, no leukocytosis but with thrombocytosis of up to 531 today.  .2 and ESR >130.  He is anemic with low albumin.  Blood cultures have been negative.  CT scan of the right knee noted a 5 cm area of subcutaneous fluid collection in the prepatellar region.  There was aspiration in the ER with findings of purulent fluid and cultures showing group B Streptococcus.  He was seen by the orthopedic surgery team with findings of right prepatellar bursa abscess and is status post incision and drainage of right knee septic arthritis and right prepatellar bursa abscess today 6/29 with group B Streptococcus  He is on ceftriaxone.    Subjective:  Remains in the ICU on ventilator in no acute distress.  No new complaints, no fevers, doing about the same.      Past Medical History:   Diagnosis Date    Arthritis     Chronic ulcer of ankle 05/26/2022    Frequent UTI 07/02/2019    Generalized anxiety disorder 05/26/2022    Neurogenic bladder 05/26/2022    Osteomyelitis 05/26/2022    Presence of suprapubic catheter 05/26/2022    Pure hypercholesterolemia 05/26/2022    Retention of urine, unspecified 08/09/2019    Spinal cord injury at T1-T6 level 04/20/2018     Past Surgical History:   Procedure Laterality Date    INCISION AND DRAINAGE, LOWER EXTREMITY Right 6/29/2023     Procedure: INCISION AND DRAINAGE, LOWER EXTREMITY;  Surgeon: Prabhu Shen DO;  Location: [facility] OR;  Service: Orthopedics;  Laterality: Right;  supine bone foam wash stuff cultures    INSERTION OF INTRAMEDULLARY MARISA Right 8/10/2022    Procedure: INSERTION, INTRAMEDULLARY MARISA RIGHT TIBIA;  Surgeon: Jorge Orellana MD;  Location: [facility] OR;  Service: Orthopedics;  Laterality: Right;     Social History     Socioeconomic History    Marital status:    Tobacco Use    Smoking status: Every Day     Types: Cigars, Cigarettes    Smokeless tobacco: Never   Substance and Sexual Activity    Alcohol use: Yes     Alcohol/week: 2.0 standard drinks     Types: 2 Cans of beer per week    Drug use: Not Currently    Sexual activity: Never       Review of Systems   Unable to perform ROS: Intubated       Review of patient's allergies indicates:   Allergen Reactions    Baclofen Itching and Anxiety         Scheduled Meds:   albuterol-ipratropium  3 mL Nebulization BID    amLODIPine  10 mg Per NG tube Daily    atorvastatin  20 mg Per NG tube Nightly    carvediloL  25 mg Per NG tube BID    cefTRIAXone (ROCEPHIN) IVPB  2 g Intravenous Q24H    cloNIDine 0.2 mg/24 hr td ptwk  1 patch Transdermal Q7 Days    doxycycline  100 mg Per NG tube Q12H    enoxaparin  30 mg Subcutaneous Q12H    famotidine (PF)  20 mg Intravenous Daily    fenofibrate  48 mg Per NG tube Daily    guaiFENesin 100 mg/5 ml  400 mg Per NG tube Q4H    miconazole NITRATE 2 %   Topical (Top) BID    oxybutynin  5 mg Per NG tube BID    senna-docusate 8.6-50 mg  2 tablet Per NG tube BID    sodium chloride 3%  4 mL Nebulization BID    sodium citrate-citric acid 500-334 mg/5 ml  30 mL Oral Once     Continuous Infusions:   sodium chloride 0.9% 75 mL/hr at 07/25/23 1936    clevidipine Stopped (07/21/23 1900)    dexmedeTOMIDine (Precedex) infusion (titrating) 0.2 mcg/kg/hr (07/18/23 2021)    NORepinephrine bitartrate-D5W Stopped (07/12/23 1039)    propofoL 50 mcg/kg/min (07/26/23  ")     PRN Meds:sodium chloride, acetaminophen, etomidate, [] fentaNYL **FOLLOWED BY** fentaNYL, fentaNYL, hydrALAZINE, labetalol, oxyCODONE-acetaminophen, rocuronium, sodium chloride 0.9%, sodium chloride 0.9%    Objective:  BP (!) 146/80   Pulse 86   Temp 98.5 °F (36.9 °C) (Oral)   Resp 17   Ht 5' 9.69" (1.77 m)   Wt 86.2 kg (190 lb)   SpO2 100%   BMI 27.51 kg/m²     Physical Exam:   Physical Exam  Vitals reviewed.   HENT:      Head: Normocephalic.   Cardiovascular:      Rate and Rhythm: Normal rate and regular rhythm.   Pulmonary:      Effort: Pulmonary effort is normal. No respiratory distress.      Breath sounds: B/L BS coarse. No wheezing.      Comments: On vent  Abdominal:      General: Bowel sounds are normal. There is no distension. L nare NGT     Palpations: Abdomen is soft.      Tenderness: There is no abdominal tenderness.   Genitourinary:     Comments: Suprapubic catheter  Musculoskeletal:      Cervical back: Normal range of motion.      Comments: Paraplegic   Skin:     Findings: No rash.      Comments: Wounds dressed. R knee with wound vac in place   Neurological:      Mental Status: He arouses to verbal stimuli, answers simple questions appropriately. Follows commands.   Psychiatric:         Mood and Affect: Mood normal.         Behavior: Behavior normal.     Imaging  Imaging Results              CT Knee W W/O Contrast Right (Final result)  Result time 23 18:37:42      Final result by Gustavo Cassidy MD (23 18:37:42)                   Impression:      Mildly limited assessment.  5 cm area of subcutaneous fluid in the prepatellar region may have thin peripheral enhancement.  Fluid collection is possible.    Subcutaneous edema in the calf.      Electronically signed by: Gustavo Cassidy  Date:    2023  Time:    18:37               Narrative:    EXAMINATION:  CT KNEE W W/O CONTRAST RIGHT    CLINICAL HISTORY:  possible infection;    TECHNIQUE:  CT imaging of the right knee " before and after IV contrast.  Axial, coronal and sagittal reformatted images reviewed.   Dose length product is 585 mGycm. Automatic exposure control, adjustment of mA/kV or iterative reconstruction technique used to limit radiation dose.    COMPARISON:  Radiograph 06/28/2023    FINDINGS:  Assessment mildly limited by motion.  Joint alignments are maintained with degenerative changes at the knee.  Fixation hardware in the lower femur and tibia with remote proximal fibular fracture.  Areas of heterotopic ossification along the lower portion of the femur.  Small knee effusion.  Areas of subcutaneous edema anteriorly below the knee.  More focal area of fluid in the subcutaneous tissues of the prepatellar region measures up to 5 cm in transverse diameter and 5 cm in length.  There is image noise from the hardware, but this fluid may have thin areas of peripheral enhancement.  No tracking subcutaneous gas.                                       X-Ray Tibia Fibula 2 View Right (Final result)  Result time 06/28/23 18:14:06      Final result by Tracy Zamudio MD (06/28/23 18:14:06)                   Impression:      Posttraumatic and postsurgical changes at the femur and lower leg.  There is chronic deformity at the distal femur with heterogeneous sclerosis and lucency superimposed on background bony demineralization.  No old imaging of this region available for comparison.  This makes it difficult to evaluate for acute superimposed lytic change.    Postsurgical and posttraumatic change at the tibia and fibula with bony demineralization.  No appreciable acute osseous abnormality.      Electronically signed by: Tracy Zamudio  Date:    06/28/2023  Time:    18:14               Narrative:    EXAMINATION:  XR FEMUR 2 VIEW RIGHT; XR TIBIA FIBULA 2 VIEW RIGHT    CLINICAL HISTORY:  possible infection;; infection;    TECHNIQUE:  AP and lateral views of the right femur were performed.    AP and lateral views of the right  tibia and fibula were obtained.    COMPARISON:  Knee radiographs 06/28/2023, tibia and fibular radiographs 08/10/2022    FINDINGS:  There are postsurgical changes of ORIF at the distal femur with lateral plate and screws.  There are postsurgical changes of ORIF at the tibia with antegrade intramedullary raven and proximal and distal interlocking screws.  Hardware appears intact.  No acute fracture seen.  There are old, healed fracture deformities.    There is chronic remodeling at the distal femur with heterogeneous appearance of the marrow and cortex distally.  There are areas of lucency as well as sclerosis.  There is no imaging through the distal femur available for comparison to evaluate for acute lytic changes superimposed on chronic background posttraumatic and postsurgical change.  Older prior radiographs of the tibia and fibula do not adequately imaged the area.  The bones are demineralized.    There is soft tissue swelling at the knee.  There is soft tissue swelling at the ankle.                                       X-Ray Femur 2 View Right (Final result)  Result time 06/28/23 18:14:06      Final result by Tracy Zamudio MD (06/28/23 18:14:06)                   Impression:      Posttraumatic and postsurgical changes at the femur and lower leg.  There is chronic deformity at the distal femur with heterogeneous sclerosis and lucency superimposed on background bony demineralization.  No old imaging of this region available for comparison.  This makes it difficult to evaluate for acute superimposed lytic change.    Postsurgical and posttraumatic change at the tibia and fibula with bony demineralization.  No appreciable acute osseous abnormality.      Electronically signed by: Tracy Zamudio  Date:    06/28/2023  Time:    18:14               Narrative:    EXAMINATION:  XR FEMUR 2 VIEW RIGHT; XR TIBIA FIBULA 2 VIEW RIGHT    CLINICAL HISTORY:  possible infection;; infection;    TECHNIQUE:  AP and lateral  views of the right femur were performed.    AP and lateral views of the right tibia and fibula were obtained.    COMPARISON:  Knee radiographs 06/28/2023, tibia and fibular radiographs 08/10/2022    FINDINGS:  There are postsurgical changes of ORIF at the distal femur with lateral plate and screws.  There are postsurgical changes of ORIF at the tibia with antegrade intramedullary raven and proximal and distal interlocking screws.  Hardware appears intact.  No acute fracture seen.  There are old, healed fracture deformities.    There is chronic remodeling at the distal femur with heterogeneous appearance of the marrow and cortex distally.  There are areas of lucency as well as sclerosis.  There is no imaging through the distal femur available for comparison to evaluate for acute lytic changes superimposed on chronic background posttraumatic and postsurgical change.  Older prior radiographs of the tibia and fibula do not adequately imaged the area.  The bones are demineralized.    There is soft tissue swelling at the knee.  There is soft tissue swelling at the ankle.                                       X-Ray Knee 3 View Right (Final result)  Result time 06/28/23 14:18:03      Final result by Lanette Waller MD (06/28/23 14:18:03)                   Impression:      1. No acute bony abnormality.  2. Soft tissue swelling around the knee.      Electronically signed by: Lanette Waller  Date:    06/28/2023  Time:    14:18               Narrative:    EXAMINATION:  XR KNEE 3 VIEW RIGHT    CLINICAL HISTORY:  Effusion, right knee    COMPARISON:  None.    FINDINGS:  There has been prior internal fixation of the femur and tibia.  There are chronic changes of the bones with heterotopic ossification around the knee.  There is no acute fracture identified.  There is soft tissue swelling around the knee.                                       Lab Review   Recent Results (from the past 24 hour(s))   Comprehensive Metabolic Panel     Collection Time: 07/26/23  1:43 AM   Result Value Ref Range    Sodium Level 142 136 - 145 mmol/L    Potassium Level 4.4 3.5 - 5.1 mmol/L    Chloride 111 (H) 98 - 107 mmol/L    Carbon Dioxide 22 22 - 29 mmol/L    Glucose Level 147 (H) 74 - 100 mg/dL    Blood Urea Nitrogen 66.9 (H) 8.4 - 25.7 mg/dL    Creatinine 1.21 (H) 0.73 - 1.18 mg/dL    Calcium Level Total 8.8 8.4 - 10.2 mg/dL    Protein Total 6.8 6.4 - 8.3 gm/dL    Albumin Level 2.3 (L) 3.5 - 5.0 g/dL    Globulin 4.5 (H) 2.4 - 3.5 gm/dL    Albumin/Globulin Ratio 0.5 (L) 1.1 - 2.0 ratio    Bilirubin Total 0.2 <=1.5 mg/dL    Alkaline Phosphatase 88 40 - 150 unit/L    Alanine Aminotransferase 14 0 - 55 unit/L    Aspartate Aminotransferase 19 5 - 34 unit/L    eGFR >60 mls/min/1.73/m2   Sedimentation rate    Collection Time: 07/26/23  1:43 AM   Result Value Ref Range    Sed Rate 54 (H) 0 - 15 mm/hr   C-Reactive Protein    Collection Time: 07/26/23  1:43 AM   Result Value Ref Range    C-Reactive Protein 68.60 (H) <5.00 mg/L   CBC with Differential    Collection Time: 07/26/23  1:43 AM   Result Value Ref Range    WBC 10.05 4.50 - 11.50 x10(3)/mcL    RBC 3.67 (L) 4.70 - 6.10 x10(6)/mcL    Hgb 9.3 (L) 14.0 - 18.0 g/dL    Hct 29.3 (L) 42.0 - 52.0 %    MCV 79.8 (L) 80.0 - 94.0 fL    MCH 25.3 (L) 27.0 - 31.0 pg    MCHC 31.7 (L) 33.0 - 36.0 g/dL    RDW 23.6 (H) 11.5 - 17.0 %    Platelet 583 (H) 130 - 400 x10(3)/mcL    MPV 9.5 7.4 - 10.4 fL    Neut % 67.5 %    Lymph % 20.2 %    Mono % 10.0 %    Eos % 1.6 %    Basophil % 0.2 %    Lymph # 2.03 0.6 - 4.6 x10(3)/mcL    Neut # 6.79 2.1 - 9.2 x10(3)/mcL    Mono # 1.00 0.1 - 1.3 x10(3)/mcL    Eos # 0.16 0 - 0.9 x10(3)/mcL    Baso # 0.02 <=0.2 x10(3)/mcL    IG# 0.05 (H) 0 - 0.04 x10(3)/mcL    IG% 0.5 %    NRBC% 0.0 %             Assessment/Plan:  1. SIRS with fevers  2. Group B Streptococcus Right knee prepatellar abscess  3. Group B Streptococcus Right knee septic arthritis  4.  Anemia/reactive thrombocytosis  5.  Paraplegia  secondary to T-spine cord injury  6. History of hepatitis-C   7. Chronic right ankle wound/osteomyelitis  8.  Diabetes    9.  Acute kidney injury  10. Acute hypoxic respiratory failure  11. Pulmonary edema with pleural effusions/ Pneumonitis      -Continue ceftriaxone with end date of 08/09 and maintain chronic suppressive doxycycline, following inflammatory markers  -Afebrile without leukocytosis but with worsening reactive thrombocytosis, follow  -Stool for c-diff PCR and toxin (-)  -7/4 and 7/8 blood cultures negative and sputum culture with moderate yeast.    -7/4 CT scan of the abdomen and pelvis and 7/3 chest x-ray results noted, likely dealing with pulmonary edema with pleural effusions and possibly superimposed infectious pneumonitis.   -6/28 right knee abscess culture with Group B Streptococcus  -Seen by Orthopedic surgery team s/p I&D of R prepatellar bursa abscess and septic R knee with cultures yielding Group G Streptococcus  -6/28 blood cultures negative  -Multiple comorbidities, paraplegic with chronic pressure wounds, right ankle osteomyelitis with exposed bone on chronic suppressive doxycycline  -We will continue surgical site care per Orthopedic surgery team  -We will continue wound care  -He is to continue management of his hepatitis-C as outpatient  -Renal impairment noted, HD per Nephrology, started 7/4  -7/3 Renal ultrasound results noted  -Re-intubated on 7/21 followed by Bronchoscopy with mucus plugging noted.  Plan for tracheostomy and PEG placement tomorrow 7/27 noted   -Continue vent management and ICU support per Intensivist  -Discussed with nursing staff.

## 2023-07-28 LAB
ALBUMIN SERPL-MCNC: 2.2 G/DL (ref 3.5–5)
BASOPHILS # BLD AUTO: 0.02 X10(3)/MCL
BASOPHILS NFR BLD AUTO: 0.3 %
BUN SERPL-MCNC: 29.8 MG/DL (ref 8.4–25.7)
CALCIUM SERPL-MCNC: 8.9 MG/DL (ref 8.4–10.2)
CHLORIDE SERPL-SCNC: 113 MMOL/L (ref 98–107)
CO2 SERPL-SCNC: 24 MMOL/L (ref 22–29)
CREAT SERPL-MCNC: 0.82 MG/DL (ref 0.73–1.18)
EOSINOPHIL # BLD AUTO: 0.14 X10(3)/MCL (ref 0–0.9)
EOSINOPHIL NFR BLD AUTO: 2.2 %
ERYTHROCYTE [DISTWIDTH] IN BLOOD BY AUTOMATED COUNT: 23.9 % (ref 11.5–17)
GFR SERPLBLD CREATININE-BSD FMLA CKD-EPI: >60 MLS/MIN/1.73/M2
GLUCOSE SERPL-MCNC: 142 MG/DL (ref 74–100)
HCT VFR BLD AUTO: 27.7 % (ref 42–52)
HGB BLD-MCNC: 8.5 G/DL (ref 14–18)
IMM GRANULOCYTES # BLD AUTO: 0.03 X10(3)/MCL (ref 0–0.04)
IMM GRANULOCYTES NFR BLD AUTO: 0.5 %
LYMPHOCYTES # BLD AUTO: 1.44 X10(3)/MCL (ref 0.6–4.6)
LYMPHOCYTES NFR BLD AUTO: 23.1 %
MAGNESIUM SERPL-MCNC: 1.7 MG/DL (ref 1.6–2.6)
MCH RBC QN AUTO: 25 PG (ref 27–31)
MCHC RBC AUTO-ENTMCNC: 30.7 G/DL (ref 33–36)
MCV RBC AUTO: 81.5 FL (ref 80–94)
MONOCYTES # BLD AUTO: 0.76 X10(3)/MCL (ref 0.1–1.3)
MONOCYTES NFR BLD AUTO: 12.2 %
NEUTROPHILS # BLD AUTO: 3.85 X10(3)/MCL (ref 2.1–9.2)
NEUTROPHILS NFR BLD AUTO: 61.7 %
NRBC BLD AUTO-RTO: 0 %
PHOSPHATE SERPL-MCNC: 4.1 MG/DL (ref 2.3–4.7)
PLATELET # BLD AUTO: 591 X10(3)/MCL (ref 130–400)
PMV BLD AUTO: 9.8 FL (ref 7.4–10.4)
POTASSIUM SERPL-SCNC: 4.4 MMOL/L (ref 3.5–5.1)
RBC # BLD AUTO: 3.4 X10(6)/MCL (ref 4.7–6.1)
SODIUM SERPL-SCNC: 145 MMOL/L (ref 136–145)
WBC # SPEC AUTO: 6.24 X10(3)/MCL (ref 4.5–11.5)

## 2023-07-28 PROCEDURE — 25000003 PHARM REV CODE 250: Performed by: INTERNAL MEDICINE

## 2023-07-28 PROCEDURE — 99900035 HC TECH TIME PER 15 MIN (STAT)

## 2023-07-28 PROCEDURE — 80069 RENAL FUNCTION PANEL: CPT | Performed by: INTERNAL MEDICINE

## 2023-07-28 PROCEDURE — 63600175 PHARM REV CODE 636 W HCPCS: Performed by: INTERNAL MEDICINE

## 2023-07-28 PROCEDURE — 63600175 PHARM REV CODE 636 W HCPCS: Mod: JZ,JG

## 2023-07-28 PROCEDURE — 25000003 PHARM REV CODE 250

## 2023-07-28 PROCEDURE — 25000003 PHARM REV CODE 250: Performed by: STUDENT IN AN ORGANIZED HEALTH CARE EDUCATION/TRAINING PROGRAM

## 2023-07-28 PROCEDURE — 83735 ASSAY OF MAGNESIUM: CPT | Performed by: INTERNAL MEDICINE

## 2023-07-28 PROCEDURE — 20000000 HC ICU ROOM

## 2023-07-28 PROCEDURE — 94761 N-INVAS EAR/PLS OXIMETRY MLT: CPT

## 2023-07-28 PROCEDURE — 27100171 HC OXYGEN HIGH FLOW UP TO 24 HOURS

## 2023-07-28 PROCEDURE — 27000221 HC OXYGEN, UP TO 24 HOURS

## 2023-07-28 PROCEDURE — 94003 VENT MGMT INPAT SUBQ DAY: CPT

## 2023-07-28 PROCEDURE — 94640 AIRWAY INHALATION TREATMENT: CPT

## 2023-07-28 PROCEDURE — C9248 INJ, CLEVIDIPINE BUTYRATE: HCPCS | Mod: JZ,JG

## 2023-07-28 PROCEDURE — 99900026 HC AIRWAY MAINTENANCE (STAT)

## 2023-07-28 PROCEDURE — 97605 NEG PRS WND THER DME<=50SQCM: CPT

## 2023-07-28 PROCEDURE — 85025 COMPLETE CBC W/AUTO DIFF WBC: CPT | Performed by: INTERNAL MEDICINE

## 2023-07-28 PROCEDURE — 99900031 HC PATIENT EDUCATION (STAT)

## 2023-07-28 PROCEDURE — 25000242 PHARM REV CODE 250 ALT 637 W/ HCPCS: Performed by: INTERNAL MEDICINE

## 2023-07-28 RX ADMIN — MICONAZOLE NITRATE 2 % TOPICAL POWDER: at 08:07

## 2023-07-28 RX ADMIN — ENOXAPARIN SODIUM 30 MG: 30 INJECTION SUBCUTANEOUS at 08:07

## 2023-07-28 RX ADMIN — CLEVIPIDINE 2 MG/HR: 0.5 EMULSION INTRAVENOUS at 08:07

## 2023-07-28 RX ADMIN — ATORVASTATIN CALCIUM 20 MG: 10 TABLET, FILM COATED ORAL at 08:07

## 2023-07-28 RX ADMIN — SENNOSIDES AND DOCUSATE SODIUM 2 TABLET: 50; 8.6 TABLET ORAL at 08:07

## 2023-07-28 RX ADMIN — GUAIFENESIN 400 MG: 200 SOLUTION ORAL at 06:07

## 2023-07-28 RX ADMIN — DEXMEDETOMIDINE HYDROCHLORIDE 1 MCG/KG/HR: 400 INJECTION INTRAVENOUS at 03:07

## 2023-07-28 RX ADMIN — AMLODIPINE BESYLATE 10 MG: 5 TABLET ORAL at 08:07

## 2023-07-28 RX ADMIN — IPRATROPIUM BROMIDE AND ALBUTEROL SULFATE 3 ML: 2.5; .5 SOLUTION RESPIRATORY (INHALATION) at 07:07

## 2023-07-28 RX ADMIN — DEXMEDETOMIDINE HYDROCHLORIDE 1 MCG/KG/HR: 400 INJECTION INTRAVENOUS at 08:07

## 2023-07-28 RX ADMIN — PROPOFOL 50 MCG/KG/MIN: 10 INJECTION, EMULSION INTRAVENOUS at 09:07

## 2023-07-28 RX ADMIN — FENOFIBRATE 48 MG: 48 TABLET, FILM COATED ORAL at 08:07

## 2023-07-28 RX ADMIN — CARVEDILOL 25 MG: 12.5 TABLET, FILM COATED ORAL at 08:07

## 2023-07-28 RX ADMIN — DOXYCYCLINE HYCLATE 100 MG: 100 TABLET, COATED ORAL at 08:07

## 2023-07-28 RX ADMIN — DEXMEDETOMIDINE HYDROCHLORIDE 0.8 MCG/KG/HR: 400 INJECTION INTRAVENOUS at 03:07

## 2023-07-28 RX ADMIN — PROPOFOL 50 MCG/KG/MIN: 10 INJECTION, EMULSION INTRAVENOUS at 03:07

## 2023-07-28 RX ADMIN — PROPOFOL 50 MCG/KG/MIN: 10 INJECTION, EMULSION INTRAVENOUS at 08:07

## 2023-07-28 RX ADMIN — OXYBUTYNIN CHLORIDE 5 MG: 5 TABLET ORAL at 08:07

## 2023-07-28 RX ADMIN — FAMOTIDINE 20 MG: 10 INJECTION, SOLUTION INTRAVENOUS at 08:07

## 2023-07-28 RX ADMIN — GUAIFENESIN 400 MG: 200 SOLUTION ORAL at 08:07

## 2023-07-28 RX ADMIN — GUAIFENESIN 400 MG: 200 SOLUTION ORAL at 01:07

## 2023-07-28 RX ADMIN — SODIUM CHLORIDE 30 MG/ML INHALATION SOLUTION 4 ML: 30 SOLUTION INHALANT at 07:07

## 2023-07-28 RX ADMIN — PROPOFOL 40 MCG/KG/MIN: 10 INJECTION, EMULSION INTRAVENOUS at 08:07

## 2023-07-28 RX ADMIN — DEXMEDETOMIDINE HYDROCHLORIDE 0.8 MCG/KG/HR: 400 INJECTION INTRAVENOUS at 08:07

## 2023-07-28 RX ADMIN — PROPOFOL 40 MCG/KG/MIN: 10 INJECTION, EMULSION INTRAVENOUS at 01:07

## 2023-07-28 NOTE — PROGRESS NOTES
Progress Note  Nephrology    Admit Date: 6/28/2023   LOS: 30 days     SUBJECTIVE:     Follow-up For:  ANTON / ATN    Interval History:  59 Y/O male with history of paraplegia and bladder dysfunction with suprapubic catheter with frequent infection admitted to hospital for Right knee septic joint   Had respiratory failure and currently on vent   Had to have a couple of dislysis done due to oliguria and high bun and creatinin but currently BUN is 29 and CR is 0.8 and off of dialysis   Has NG tube with feeding     Review of Systems:  Constitutional: No fever or chills  Respiratory: No cough or shortness of breath  Cardiovascular: No chest pain or palpitations  Gastrointestinal: No nausea or vomiting  Neurological: No confusion or weakness    OBJECTIVE:     Vital Signs Range (Last 24H):  Vitals:    07/28/23 0800   BP:    Pulse:    Resp:    Temp: 99 °F (37.2 °C)       Temp:  [97.5 °F (36.4 °C)-99 °F (37.2 °C)]   Pulse:  [60-85]   Resp:  [14-26]   BP: (115-173)/(65-99)   SpO2:  [95 %-100 %]     I & O (Last 24H):  Intake/Output Summary (Last 24 hours) at 7/28/2023 1451  Last data filed at 7/28/2023 1400  Gross per 24 hour   Intake 4801.82 ml   Output 2350 ml   Net 2451.82 ml       Physical Exam:  On vent and intubated and sedated   Head   normal   Neck   supple   Chest   symmetric   Lungs   clear   Heart   RRR  Abdomen   soft , non tender   Ext   no edema     Laboratory Data:    Recent Labs   Lab 07/28/23  0152      K 4.4   CO2 24   BUN 29.8*   CREATININE 0.82   GLUCOSE 142*   CALCIUM 8.9   PHOS 4.1     Lab Results   Component Value Date    .3 (H) 07/04/2023    CALCIUM 8.9 07/28/2023    CAION 1.16 07/22/2023    PHOS 4.1 07/28/2023     Lab Results   Component Value Date    IRON 35 (L) 12/30/2021    TIBC 284 12/30/2021    FERRITIN 24.99 12/30/2021       Medications:  Medication list was reviewed and changes noted under Assessment/Plan.    Diagnostic Results:    Reviewed most recent  CT/US/Echo/MRI    ASSESSMENT/PLAN:   1   ANTON / ATN    improved  2   CKD 3  3   SHPT  4   Pre Renal    improved  5   hypernatremia   improved  6   paraplegia  7   A Fib   8   DM2  9   respiratory failure on vent  10  Right knee septic joint  11  moderate protein calorie malnutrition     Plan   DC Normal saline             Increase water flushes to 200 every 4 hrs

## 2023-07-28 NOTE — PLAN OF CARE
Problem: Adult Inpatient Plan of Care  Goal: Plan of Care Review  Outcome: Ongoing, Progressing  Goal: Patient-Specific Goal (Individualized)  Outcome: Ongoing, Progressing  Goal: Absence of Hospital-Acquired Illness or Injury  Outcome: Ongoing, Progressing  Goal: Optimal Comfort and Wellbeing  Outcome: Ongoing, Progressing  Goal: Readiness for Transition of Care  Outcome: Ongoing, Progressing     Problem: Diabetes Comorbidity  Goal: Blood Glucose Level Within Targeted Range  Outcome: Ongoing, Progressing     Problem: Skin Injury Risk Increased  Goal: Skin Health and Integrity  Outcome: Ongoing, Progressing     Problem: Impaired Wound Healing  Goal: Optimal Wound Healing  Outcome: Ongoing, Progressing     Problem: Pain Acute  Goal: Acceptable Pain Control and Functional Ability  Outcome: Ongoing, Progressing     Problem: Infection  Goal: Absence of Infection Signs and Symptoms  Outcome: Ongoing, Progressing     Problem: Communication Impairment (Mechanical Ventilation, Invasive)  Goal: Effective Communication  Outcome: Ongoing, Progressing     Problem: Device-Related Complication Risk (Mechanical Ventilation, Invasive)  Goal: Optimal Device Function  Outcome: Ongoing, Progressing     Problem: Inability to Wean (Mechanical Ventilation, Invasive)  Goal: Mechanical Ventilation Liberation  Outcome: Ongoing, Progressing     Problem: Nutrition Impairment (Mechanical Ventilation, Invasive)  Goal: Optimal Nutrition Delivery  Outcome: Ongoing, Progressing     Problem: Skin and Tissue Injury (Mechanical Ventilation, Invasive)  Goal: Absence of Device-Related Skin and Tissue Injury  Outcome: Ongoing, Progressing     Problem: Ventilator-Induced Lung Injury (Mechanical Ventilation, Invasive)  Goal: Absence of Ventilator-Induced Lung Injury  Outcome: Ongoing, Progressing     Problem: Communication Impairment (Artificial Airway)  Goal: Effective Communication  Outcome: Ongoing, Progressing     Problem: Device-Related  Complication Risk (Artificial Airway)  Goal: Optimal Device Function  Outcome: Ongoing, Progressing     Problem: Skin and Tissue Injury (Artificial Airway)  Goal: Absence of Device-Related Skin or Tissue Injury  Outcome: Ongoing, Progressing     Problem: Noninvasive Ventilation Acute  Goal: Effective Unassisted Ventilation and Oxygenation  Outcome: Ongoing, Progressing     Problem: Device-Related Complication Risk (Hemodialysis)  Goal: Safe, Effective Therapy Delivery  Outcome: Ongoing, Progressing     Problem: Hemodynamic Instability (Hemodialysis)  Goal: Effective Tissue Perfusion  Outcome: Ongoing, Progressing     Problem: Infection (Hemodialysis)  Goal: Absence of Infection Signs and Symptoms  Outcome: Ongoing, Progressing     Problem: Fall Injury Risk  Goal: Absence of Fall and Fall-Related Injury  Outcome: Ongoing, Progressing

## 2023-07-28 NOTE — NURSING
Nurses Note -- 4 Eyes      7/28/2023   2:09 AM      Skin assessed during: Daily Assessment      [] No Altered Skin Integrity Present    [x]Prevention Measures Documented      [x] Yes- Altered Skin Integrity Present or Discovered   [] LDA Added if Not in Epic (Describe Wound)   [] New Altered Skin Integrity was Present on Admit and Documented in LDA   [] Wound Image Taken    Wound Care Consulted? Yes    Attending Nurse:  Starr Sommers RN     Second RN/Staff Member:  Lauren Townsend RN

## 2023-07-28 NOTE — PROGRESS NOTES
Infectious Diseases Progress Note  58-year-old male with past medical history of T-spine cord injury with paraplegia, diabetes, HTN, hepatitis-C, chronic right ankle wound/osteomyelitis, was on suppressive doxycycline, known to my service, seen by us initially at [facility] and has followed with us at the office for chronic osteomyelitis, is admitted to [facility] on 06/28/2023 presenting through the ED with complaints of pain and swelling to his right knee, apparently struck his knee.  He did also report prior remote right knee surgery.  He was evaluated and noted to have no fevers initially but a temperature of 100.2° today 6/29, no leukocytosis but with thrombocytosis of up to 531 today.  .2 and ESR >130.  He is anemic with low albumin.  Blood cultures have been negative.  CT scan of the right knee noted a 5 cm area of subcutaneous fluid collection in the prepatellar region.  There was aspiration in the ER with findings of purulent fluid and cultures showing group B Streptococcus.  He was seen by the orthopedic surgery team with findings of right prepatellar bursa abscess and is status post incision and drainage of right knee septic arthritis and right prepatellar bursa abscess today 6/29 with group B Streptococcus  He is on ceftriaxone.    Subjective:  Remains in ICU. Intubated and on vent, no acute distress. Afebrile.     ROS  Unable to perform ROS: Intubated     Review of patient's allergies indicates:   Allergen Reactions    Baclofen Itching and Anxiety       Past Medical History:   Diagnosis Date    Arthritis     Chronic ulcer of ankle 05/26/2022    Frequent UTI 07/02/2019    Generalized anxiety disorder 05/26/2022    Neurogenic bladder 05/26/2022    Osteomyelitis 05/26/2022    Presence of suprapubic catheter 05/26/2022    Pure hypercholesterolemia 05/26/2022    Retention of urine, unspecified 08/09/2019    Spinal cord injury at T1-T6 level 04/20/2018       Past  Surgical History:   Procedure Laterality Date    INCISION AND DRAINAGE, LOWER EXTREMITY Right 6/29/2023    Procedure: INCISION AND DRAINAGE, LOWER EXTREMITY;  Surgeon: Prabhu Shen DO;  Location: [facility] OR;  Service: Orthopedics;  Laterality: Right;  supine bone foam wash stuff cultures    INSERTION OF INTRAMEDULLARY MARISA Right 8/10/2022    Procedure: INSERTION, INTRAMEDULLARY MARISA RIGHT TIBIA;  Surgeon: Jorge Orellana MD;  Location: [facility] OR;  Service: Orthopedics;  Laterality: Right;       Social History     Socioeconomic History    Marital status:    Tobacco Use    Smoking status: Every Day     Types: Cigars, Cigarettes    Smokeless tobacco: Never   Substance and Sexual Activity    Alcohol use: Yes     Alcohol/week: 2.0 standard drinks     Types: 2 Cans of beer per week    Drug use: Not Currently    Sexual activity: Never         Scheduled Meds:   albuterol-ipratropium  3 mL Nebulization BID    amLODIPine  10 mg Per NG tube Daily    atorvastatin  20 mg Per NG tube Nightly    carvediloL  25 mg Per NG tube BID    cefTRIAXone (ROCEPHIN) IVPB  2 g Intravenous Q24H    cloNIDine 0.2 mg/24 hr td ptwk  1 patch Transdermal Q7 Days    doxycycline  100 mg Per NG tube Q12H    enoxaparin  30 mg Subcutaneous Q12H    famotidine (PF)  20 mg Intravenous Daily    fenofibrate  48 mg Per NG tube Daily    guaiFENesin 100 mg/5 ml  400 mg Per NG tube Q4H    miconazole NITRATE 2 %   Topical (Top) BID    oxybutynin  5 mg Per NG tube BID    senna-docusate 8.6-50 mg  2 tablet Per NG tube BID    sodium chloride 3%  4 mL Nebulization BID    sodium citrate-citric acid 500-334 mg/5 ml  30 mL Oral Once     Continuous Infusions:   sodium chloride 0.9% 75 mL/hr at 07/27/23 1721    clevidipine 2 mg/hr (07/27/23 2025)    dexmedeTOMIDine (Precedex) infusion (titrating) 0.8 mcg/kg/hr (07/27/23 1621)    NORepinephrine bitartrate-D5W Stopped (07/12/23 1039)    propofoL 40 mcg/kg/min (07/27/23 2025)     PRN Meds:sodium chloride, acetaminophen,  "etomidate, [] fentaNYL **FOLLOWED BY** fentaNYL, fentaNYL, hydrALAZINE, labetalol, oxyCODONE-acetaminophen, rocuronium, sodium chloride 0.9%, sodium chloride 0.9%    Objective:  BP (!) 170/87   Pulse 74   Temp 98.4 °F (36.9 °C)   Resp 20   Ht 5' 9.69" (1.77 m)   Wt 86.2 kg (190 lb)   SpO2 100%   BMI 27.51 kg/m²     Physical Exam:   Physical Exam  Vitals reviewed.   HENT:      Head: Normocephalic.   Cardiovascular:      Rate and Rhythm: Normal rate and regular rhythm.   Pulmonary:      Effort: Pulmonary effort is normal. No respiratory distress.      Breath sounds: B/L BS coarse. No wheezing.      Comments: On vent  Abdominal:      General: Bowel sounds are normal. There is no distension. L nare NGT     Palpations: Abdomen is soft.      Tenderness: There is no abdominal tenderness.   Genitourinary:     Comments: Suprapubic catheter  Musculoskeletal:      Cervical back: Normal range of motion.      Comments: Paraplegic   Skin:     Findings: No rash.      Comments: Wounds dressed. R knee with wound vac in place   Neurological:      Mental Status: Unable to fully assess, pt intubated and on vent  Psychiatric:         Behavior: Sedated      Imaging      Lab Review   Recent Results (from the past 24 hour(s))   Magnesium    Collection Time: 23  3:15 PM   Result Value Ref Range    Magnesium Level 1.50 (L) 1.60 - 2.60 mg/dL   Phosphorus    Collection Time: 23  3:15 PM   Result Value Ref Range    Phosphorus Level 3.6 2.3 - 4.7 mg/dL   Comprehensive Metabolic Panel    Collection Time: 23  3:15 PM   Result Value Ref Range    Sodium Level 146 (H) 136 - 145 mmol/L    Potassium Level 4.1 3.5 - 5.1 mmol/L    Chloride 115 (H) 98 - 107 mmol/L    Carbon Dioxide 23 22 - 29 mmol/L    Glucose Level 113 (H) 74 - 100 mg/dL    Blood Urea Nitrogen 31.7 (H) 8.4 - 25.7 mg/dL    Creatinine 0.80 0.73 - 1.18 mg/dL    Calcium Level Total 8.7 8.4 - 10.2 mg/dL    Protein Total 6.9 6.4 - 8.3 gm/dL    Albumin Level 2.3 " (L) 3.5 - 5.0 g/dL    Globulin 4.6 (H) 2.4 - 3.5 gm/dL    Albumin/Globulin Ratio 0.5 (L) 1.1 - 2.0 ratio    Bilirubin Total 0.3 <=1.5 mg/dL    Alkaline Phosphatase 67 40 - 150 unit/L    Alanine Aminotransferase 13 0 - 55 unit/L    Aspartate Aminotransferase 17 5 - 34 unit/L    eGFR >60 mls/min/1.73/m2       Assessment/Plan:  1. SIRS with fevers  2. Group B Streptococcus Right knee prepatellar abscess  3. Group B Streptococcus Right knee septic arthritis  4.  Anemia/reactive thrombocytosis  5.  Paraplegia secondary to T-spine cord injury  6. History of hepatitis-C   7. Chronic right ankle wound/osteomyelitis  8.  Diabetes    9.  Acute kidney injury  10. Acute hypoxic respiratory failure  11. Pulmonary edema with pleural effusions/ Pneumonitis      -Continue ceftriaxone with end date of 08/09 and maintain chronic suppressive doxycycline, following inflammatory markers  -Afebrile without leukocytosis but with worsening reactive thrombocytosis, follow  -Stool for c-diff PCR and toxin (-)  -7/4 and 7/8 blood cultures negative and sputum culture with moderate yeast.    -7/4 CT scan of the abdomen and pelvis and 7/3 chest x-ray results noted, likely dealing with pulmonary edema with pleural effusions and possibly superimposed infectious pneumonitis.   -6/28 right knee abscess culture with Group B Streptococcus  -Seen by Orthopedic surgery team s/p I&D of R prepatellar bursa abscess and septic R knee with cultures yielding Group G Streptococcus  -6/28 blood cultures negative  -Multiple comorbidities, paraplegic with chronic pressure wounds, right ankle osteomyelitis with exposed bone on chronic suppressive doxycycline  -We will continue surgical site care per Orthopedic surgery team  -We will continue wound care  -He is to continue management of his hepatitis-C as outpatient  -Renal impairment noted, HD per Nephrology, started 7/4  -7/3 Renal ultrasound results noted  -Re-intubated on 7/21 followed by Bronchoscopy with  mucus plugging noted.  Plan for PEG and tracheostomy placement postponed till next week  -Continue vent management and ICU support per Intensivist  -Discussed with nursing staff.

## 2023-07-28 NOTE — NURSING
07/28/23 1000        Incision/Site 06/29/23 1103 Right Leg   Date First Assessed/Time First Assessed: 06/29/23 1103   Side: Right  Location: Leg   Wound Image    Dressing Appearance Intact;Moist drainage   Drainage Amount Small   Drainage Characteristics/Odor Serosanguineous;No odor   Appearance Pink;Red;Moist   Red (%), Wound Tissue Color 100 %   Periwound Area Dry;Intact   Wound Edges Irregular   Wound Length (cm) 2 cm   Wound Width (cm) 0.6 cm   Wound Depth (cm) 0.6 cm   Wound Volume (cm^3) 0.72 cm^3   Wound Surface Area (cm^2) 1.2 cm^2   Tunneling (depth (cm)/location) 12noon 4cm   Care Cleansed with:;Sterile normal saline   Dressing Changed   Positioning   Body Position right;turned   Head of Bed (HOB) Positioning HOB at 30-45 degrees   Positioning/Transfer Devices pillows;wedge     WOCN  [id number] out of right knee wd vac.   Pt remains intubated and sedated.  Slightly arousable.  Tolerated change out well.  Tunnelling at 12 o ck persistant.   Utilizing thin white sponge to keep opening open.    Care concerns with nurse Burger

## 2023-07-28 NOTE — PROGRESS NOTES
[facility] - [facility] ICU  Pulmonary Critical Care Note    Patient Name: Bianca Khan  MRN: [id number]  Admission Date: 6/28/2023  Hospital Length of Stay: 30 days  Code Status: Full Code  Attending Provider: Khris Treadwell Jr., MD, *  Primary Care Provider: Areli Vargas PA-C     Subjective:     HPI:     Bianca Khan is a 58 y.o. male with PMH of paraplegia 2/2 spinal cord injury (T1-T6), neurogenic bladder with suprapubic catheter, chronic right ankle osteomyelitis, DM II, HTN, who presented to the ED on 6/28/2023 with CC of right knee pain. Per chart review, CT of right knee revealed 5 cm area of subcutaneous fluid in prepatellar region which was aspirated in the ED; he was taken to the OR on 6/29/2023 by orthopedic surgery team and was found to have prepatellar bursa infection and septic arthritis. Wound culture 6/29/2023 positive for strep agalactiae. Orthopedic surgery team recommended right-sided above-knee amputation d/t chronically infected hardware in right lower extremity. On 7/4/2023, a RRT was called d/t acute respiratory distress that was not improving despite being placed on vapotherm at 35 L/min with FiO2 100%. At the time of examination, patient's initial GCS was 10 but progressively reduced to a GCS of 6. Shared decision with patient's wife was made to intubate patient for airway protection though ABGs were within normal limits. He was admitted to the ICU for close monitoring and further medical management.  The patient underwent incision and drainage of his right knee on 06/29.  He was admitted to the ICU and intubated for airway protection due to altered mental status on 7/4 with placement of hemodialysis catheter with initiation of hemodialysis.  Patient was subsequently extubated on 07/18 for the 2nd time.  He was reintubated on 07/21 secondary to worsening hypoxia and altered mental status with associated mucus plugging.  Status post bronchoscopy x3 since admission to the  ICU for hypoxia associated with mucus plugging.  He has been off of hemodialysis for several days now.    Hospital Course/Significant events:      24 Hour Interval History:    No acute events overnight.  His issues in getting consent yesterday so tracheostomy and PEG to was not performed.  Patient is intubated and on 40 mcg/kg/min propofol and 0.08 mcg/kg/hr of Precedex    Past Medical History:   Diagnosis Date    Arthritis     Chronic ulcer of ankle 05/26/2022    Frequent UTI 07/02/2019    Generalized anxiety disorder 05/26/2022    Neurogenic bladder 05/26/2022    Osteomyelitis 05/26/2022    Presence of suprapubic catheter 05/26/2022    Pure hypercholesterolemia 05/26/2022    Retention of urine, unspecified 08/09/2019    Spinal cord injury at T1-T6 level 04/20/2018       Past Surgical History:   Procedure Laterality Date    INCISION AND DRAINAGE, LOWER EXTREMITY Right 6/29/2023    Procedure: INCISION AND DRAINAGE, LOWER EXTREMITY;  Surgeon: Prabhu Shen DO;  Location: [facility];  Service: Orthopedics;  Laterality: Right;  supine bone foam wash stuff cultures    INSERTION OF INTRAMEDULLARY MARISA Right 8/10/2022    Procedure: INSERTION, INTRAMEDULLARY MARISA RIGHT TIBIA;  Surgeon: Jorge Orellana MD;  Location: [facility];  Service: Orthopedics;  Laterality: Right;       Social History     Socioeconomic History    Marital status:    Tobacco Use    Smoking status: Every Day     Types: Cigars, Cigarettes    Smokeless tobacco: Never   Substance and Sexual Activity    Alcohol use: Yes     Alcohol/week: 2.0 standard drinks     Types: 2 Cans of beer per week    Drug use: Not Currently    Sexual activity: Never           Current Outpatient Medications   Medication Instructions    amitriptyline (ELAVIL) 50 mg, Oral, Nightly    amLODIPine (NORVASC) 10 MG tablet 1 tablet    atorvastatin (LIPITOR) 20 mg, Oral, Nightly    busPIRone (BUSPAR) 5 MG Tab 1 tablet    carvediloL (COREG) 12.5 mg, Oral    cyclobenzaprine (FLEXERIL) 10 mg,  Oral, 2 times daily PRN    doxycycline (VIBRAMYCIN) 100 mg, Oral, Daily    fenofibrate 160 mg, Oral    FLUoxetine 20 mg, Oral    gabapentin (NEURONTIN) 300 MG capsule 1 capsule    lisinopriL (PRINIVIL,ZESTRIL) 10 mg, Oral, Nightly    lisinopriL 10 mg, Oral, Daily    LORazepam (ATIVAN) 1 mg, Oral, 2 times daily    melatonin (MELATIN) 3 mg tablet TAKE TWO TABLETS BY MOUTH EVERY NIGHT AT BEDTIME AS NEEDED FOR INSOMNIA.    meloxicam (MOBIC) 15 mg, Oral, Daily    metFORMIN (GLUCOPHAGE) 500 MG tablet SMARTSI Tablet(s) By Mouth Morning-Evening    oxybutynin (DITROPAN) 5 mg, Oral, 2 times daily    oxyCODONE-acetaminophen (PERCOCET)  mg per tablet 1 tablet, Oral, Every 6 hours PRN    pantoprazole (PROTONIX) 40 MG tablet 1 tablet    temazepam (RESTORIL) 30 mg, Oral, Nightly    traZODone (DESYREL) 50 mg, Oral, Nightly    XARELTO 20 mg Tab SMARTSI Tablet(s) By Mouth Every Evening       Current Inpatient Medications   albuterol-ipratropium  3 mL Nebulization BID    amLODIPine  10 mg Per NG tube Daily    atorvastatin  20 mg Per NG tube Nightly    carvediloL  25 mg Per NG tube BID    cefTRIAXone (ROCEPHIN) IVPB  2 g Intravenous Q24H    cloNIDine 0.2 mg/24 hr td ptwk  1 patch Transdermal Q7 Days    doxycycline  100 mg Per NG tube Q12H    enoxaparin  30 mg Subcutaneous Q12H    famotidine (PF)  20 mg Intravenous Daily    fenofibrate  48 mg Per NG tube Daily    guaiFENesin 100 mg/5 ml  400 mg Per NG tube Q4H    miconazole NITRATE 2 %   Topical (Top) BID    oxybutynin  5 mg Per NG tube BID    senna-docusate 8.6-50 mg  2 tablet Per NG tube BID    sodium chloride 3%  4 mL Nebulization BID    sodium citrate-citric acid 500-334 mg/5 ml  30 mL Oral Once       Current Intravenous Infusions   sodium chloride 0.9% 75 mL/hr at 23 1721    clevidipine 2 mg/hr (23 0230)    dexmedeTOMIDine (Precedex) infusion (titrating) 0.8 mcg/kg/hr (23 0307)    NORepinephrine bitartrate-D5W Stopped (23 1039)    propofoL 40  mcg/kg/min (07/28/23 0153)         ROS     Unable to obtain as patient is intubated  Objective:       Intake/Output Summary (Last 24 hours) at 7/28/2023 0522  Last data filed at 7/28/2023 0400  Gross per 24 hour   Intake 1334.54 ml   Output 2050 ml   Net -715.46 ml         Vital Signs (Most Recent):  Temp: 97.5 °F (36.4 °C) (07/28/23 0400)  Pulse: 69 (07/28/23 0445)  Resp: 20 (07/28/23 0445)  BP: 136/73 (07/28/23 0445)  SpO2: 99 % (07/28/23 0445)  Body mass index is 27.51 kg/m².  Weight: 86.2 kg (190 lb) Vital Signs (24h Range):  Temp:  [97.5 °F (36.4 °C)-98.6 °F (37 °C)] 97.5 °F (36.4 °C)  Pulse:  [60-85] 69  Resp:  [14-26] 20  SpO2:  [99 %-100 %] 99 %  BP: (101-196)/() 136/73     Physical Exam  General:   no acute respiratory distress  Head: Normocephalic, atraumatic  Eyes: PERRL, EOMI, anicteric sclera  Neck: supple, normal ROM, no thyromegaly   CVS:  Irregularly irregular 2+ peripheral pulses  Resp:  Lungs clear to auscultation bilaterally, no wheezes, rales, or rhonci  GI:  Abdomen soft, non-tender, non-distended, normoactive bowel sounds  MSK:  Wound VAC right knee  Skin:  No rashes, ulcers, erythema  Neuro:  Cough, gag, cornea and pupillary reflex intact      Lines/Drains/Airways       Drain  Duration                  Suprapubic Catheter 07/06/23 1544 100% silicone 22 Fr. 21 days         NG/OG Tube 07/21/23 1000 [place] sump 16 Fr. Left nostril 6 days              Airway  Duration                  Airway - Non-Surgical 07/21/23 1655 6 days              Peripheral Intravenous Line  Duration                  Midline Catheter Insertion/Assessment  - Single Lumen 07/22/23 1603 Left basilic vein (medial side of arm) other (see comments) 5 days         Peripheral IV - Single Lumen 07/27/23 2145 20 G Posterior;Left Hand <1 day                    Significant Labs:    Lab Results   Component Value Date    WBC 6.24 07/28/2023    HGB 8.5 (L) 07/28/2023    HCT 27.7 (L) 07/28/2023    MCV 81.5 07/28/2023     (H)  07/28/2023         BMP  Lab Results   Component Value Date     07/28/2023    K 4.4 07/28/2023    CHLORIDE 113 (H) 07/28/2023    CO2 24 07/28/2023    BUN 29.8 (H) 07/28/2023    CREATININE 0.82 07/28/2023    CALCIUM 8.9 07/28/2023    AGAP 9.0 08/29/2022    ESTGFRAFRICA 101 05/11/2022    EGFRNONAA >60 04/27/2022       ABG  Recent Labs   Lab 07/22/23 0439   PH 7.480*   PO2 78.0*   HCO3 28.3*       Mechanical Ventilation Support:  Vent Mode: A/C (07/28/23 0458)  Ventilator Initiated: Yes (07/21/23 1655)  Set Rate: 20 BPM (07/28/23 0458)  Vt Set: 450 mL (07/28/23 0458)  Pressure Support: 12 cmH20 (07/18/23 1031)  PEEP/CPAP: 5 cmH20 (07/28/23 0458)  Oxygen Concentration (%): 30 (07/28/23 0458)  Peak Airway Pressure: 37 cmH20 (07/28/23 0458)  Total Ve: 6.6 L/m (07/28/23 0458)  F/VT Ratio<105 (RSBI): (!) 46.51 (07/28/23 0030)    Significant Imaging:  I have reviewed the pertinent imaging within the past 24 hours.        Assessment/Plan:     Assessment  -prepatellar bursitis with septic arthritis of the right knee with Streptococcus agalactiae on culture status post incision and drainage on 06/29/2023 with wound VAC in place currently on suppressive antibiotics with doxycycline  acute hypoxemic respiratory failure with associated pneumonia in addition to pulmonary edema on mechanical ventilation status post intubation x3  acute kidney injury with chronic kidney disease stage 4 status post several sessions hemodialysis currently not receiving hemodialysis   hypoactive delirium   paroxysmal atrial fibrillation with permanent pacemaker in place   history of paraplegia secondary to spinal cord injury at level T1 with associated neurogenic bladder and chronic right ankle osteomyelitis  diabetes mellitus type 2   hypertension      Plan  - titrate mechanical ventilation for ARDS net protocol using ABGs  -supplement oxygen to maintain saturation 94-96%   -plan for tracheostomy to and PEG tube placement together to be scheduled  by ENT surgery  -continue wound VAC per orthopedic surgery recommendations and Infectious Disease recommendations with suppressive antibiotics to finish on 08/09, on doxycycline    DVT Prophylaxis: lovenox  GI Prophylaxis: Pepcid     32 minutes of critical care was time spent personally by me on the following activities: development of treatment plan with patient or surrogate and bedside caregivers, discussions with consultants, evaluation of patient's response to treatment, examination of patient, ordering and performing treatments and interventions, ordering and review of laboratory studies, ordering and review of radiographic studies, pulse oximetry, re-evaluation of patient's condition.  This critical care time did not overlap with that of any other provider or involve time for any procedures.     George Shah MD  Pulmonary Critical Care Medicine  [facility] - [address] [place] [address]

## 2023-07-29 LAB
ANION GAP SERPL CALC-SCNC: 9 MEQ/L
BUN SERPL-MCNC: 22.3 MG/DL (ref 8.4–25.7)
CALCIUM SERPL-MCNC: 8.7 MG/DL (ref 8.4–10.2)
CHLORIDE SERPL-SCNC: 110 MMOL/L (ref 98–107)
CO2 SERPL-SCNC: 24 MMOL/L (ref 22–29)
CREAT SERPL-MCNC: 0.75 MG/DL (ref 0.73–1.18)
CREAT/UREA NIT SERPL: 30
GFR SERPLBLD CREATININE-BSD FMLA CKD-EPI: >60 MLS/MIN/1.73/M2
GLUCOSE SERPL-MCNC: 179 MG/DL (ref 74–100)
POTASSIUM SERPL-SCNC: 4 MMOL/L (ref 3.5–5.1)
SODIUM SERPL-SCNC: 143 MMOL/L (ref 136–145)

## 2023-07-29 PROCEDURE — 99900026 HC AIRWAY MAINTENANCE (STAT)

## 2023-07-29 PROCEDURE — 63600175 PHARM REV CODE 636 W HCPCS: Performed by: INTERNAL MEDICINE

## 2023-07-29 PROCEDURE — C9248 INJ, CLEVIDIPINE BUTYRATE: HCPCS | Mod: JZ,JG

## 2023-07-29 PROCEDURE — 99900035 HC TECH TIME PER 15 MIN (STAT)

## 2023-07-29 PROCEDURE — 25000003 PHARM REV CODE 250: Performed by: STUDENT IN AN ORGANIZED HEALTH CARE EDUCATION/TRAINING PROGRAM

## 2023-07-29 PROCEDURE — 25000242 PHARM REV CODE 250 ALT 637 W/ HCPCS: Performed by: INTERNAL MEDICINE

## 2023-07-29 PROCEDURE — 94003 VENT MGMT INPAT SUBQ DAY: CPT

## 2023-07-29 PROCEDURE — 25000003 PHARM REV CODE 250: Performed by: INTERNAL MEDICINE

## 2023-07-29 PROCEDURE — 27200966 HC CLOSED SUCTION SYSTEM

## 2023-07-29 PROCEDURE — 80048 BASIC METABOLIC PNL TOTAL CA: CPT | Performed by: INTERNAL MEDICINE

## 2023-07-29 PROCEDURE — 27100171 HC OXYGEN HIGH FLOW UP TO 24 HOURS

## 2023-07-29 PROCEDURE — 63600175 PHARM REV CODE 636 W HCPCS

## 2023-07-29 PROCEDURE — 94761 N-INVAS EAR/PLS OXIMETRY MLT: CPT

## 2023-07-29 PROCEDURE — 20000000 HC ICU ROOM

## 2023-07-29 PROCEDURE — 94640 AIRWAY INHALATION TREATMENT: CPT

## 2023-07-29 PROCEDURE — 27000221 HC OXYGEN, UP TO 24 HOURS

## 2023-07-29 PROCEDURE — 63600175 PHARM REV CODE 636 W HCPCS: Mod: JZ,JG

## 2023-07-29 PROCEDURE — 99900031 HC PATIENT EDUCATION (STAT)

## 2023-07-29 PROCEDURE — 99232 PR SUBSEQUENT HOSPITAL CARE,LEVL II: ICD-10-PCS | Mod: ,,, | Performed by: STUDENT IN AN ORGANIZED HEALTH CARE EDUCATION/TRAINING PROGRAM

## 2023-07-29 PROCEDURE — 25000003 PHARM REV CODE 250

## 2023-07-29 PROCEDURE — 99232 SBSQ HOSP IP/OBS MODERATE 35: CPT | Mod: ,,, | Performed by: STUDENT IN AN ORGANIZED HEALTH CARE EDUCATION/TRAINING PROGRAM

## 2023-07-29 RX ADMIN — CLEVIPIDINE 4 MG/HR: 0.5 EMULSION INTRAVENOUS at 03:07

## 2023-07-29 RX ADMIN — IPRATROPIUM BROMIDE AND ALBUTEROL SULFATE 3 ML: 2.5; .5 SOLUTION RESPIRATORY (INHALATION) at 07:07

## 2023-07-29 RX ADMIN — DOXYCYCLINE HYCLATE 100 MG: 100 TABLET, COATED ORAL at 08:07

## 2023-07-29 RX ADMIN — PROPOFOL 50 MCG/KG/MIN: 10 INJECTION, EMULSION INTRAVENOUS at 11:07

## 2023-07-29 RX ADMIN — CLEVIPIDINE 2 MG/HR: 0.5 EMULSION INTRAVENOUS at 11:07

## 2023-07-29 RX ADMIN — OXYBUTYNIN CHLORIDE 5 MG: 5 TABLET ORAL at 08:07

## 2023-07-29 RX ADMIN — ATORVASTATIN CALCIUM 20 MG: 10 TABLET, FILM COATED ORAL at 08:07

## 2023-07-29 RX ADMIN — GUAIFENESIN 400 MG: 200 SOLUTION ORAL at 01:07

## 2023-07-29 RX ADMIN — ENOXAPARIN SODIUM 30 MG: 30 INJECTION SUBCUTANEOUS at 08:07

## 2023-07-29 RX ADMIN — PROPOFOL 50 MCG/KG/MIN: 10 INJECTION, EMULSION INTRAVENOUS at 05:07

## 2023-07-29 RX ADMIN — GUAIFENESIN 400 MG: 200 SOLUTION ORAL at 08:07

## 2023-07-29 RX ADMIN — SENNOSIDES AND DOCUSATE SODIUM 2 TABLET: 50; 8.6 TABLET ORAL at 08:07

## 2023-07-29 RX ADMIN — CARVEDILOL 25 MG: 12.5 TABLET, FILM COATED ORAL at 08:07

## 2023-07-29 RX ADMIN — PROPOFOL 50 MCG/KG/MIN: 10 INJECTION, EMULSION INTRAVENOUS at 08:07

## 2023-07-29 RX ADMIN — DEXMEDETOMIDINE HYDROCHLORIDE 1 MCG/KG/HR: 400 INJECTION INTRAVENOUS at 08:07

## 2023-07-29 RX ADMIN — DEXMEDETOMIDINE HYDROCHLORIDE 1 MCG/KG/HR: 400 INJECTION INTRAVENOUS at 02:07

## 2023-07-29 RX ADMIN — MICONAZOLE NITRATE 2 % TOPICAL POWDER: at 08:07

## 2023-07-29 RX ADMIN — GUAIFENESIN 400 MG: 200 SOLUTION ORAL at 05:07

## 2023-07-29 RX ADMIN — DEXMEDETOMIDINE HYDROCHLORIDE 1 MCG/KG/HR: 400 INJECTION INTRAVENOUS at 03:07

## 2023-07-29 RX ADMIN — DEXMEDETOMIDINE HYDROCHLORIDE 1 MCG/KG/HR: 400 INJECTION INTRAVENOUS at 11:07

## 2023-07-29 RX ADMIN — CLEVIPIDINE 2 MG/HR: 0.5 EMULSION INTRAVENOUS at 06:07

## 2023-07-29 RX ADMIN — PROPOFOL 50 MCG/KG/MIN: 10 INJECTION, EMULSION INTRAVENOUS at 03:07

## 2023-07-29 RX ADMIN — DEXMEDETOMIDINE HYDROCHLORIDE 1 MCG/KG/HR: 400 INJECTION INTRAVENOUS at 06:07

## 2023-07-29 RX ADMIN — GUAIFENESIN 400 MG: 200 SOLUTION ORAL at 02:07

## 2023-07-29 RX ADMIN — LABETALOL HYDROCHLORIDE 10 MG: 5 INJECTION, SOLUTION INTRAVENOUS at 12:07

## 2023-07-29 RX ADMIN — SODIUM CHLORIDE 30 MG/ML INHALATION SOLUTION 4 ML: 30 SOLUTION INHALANT at 07:07

## 2023-07-29 RX ADMIN — PROPOFOL 50 MCG/KG/MIN: 10 INJECTION, EMULSION INTRAVENOUS at 02:07

## 2023-07-29 RX ADMIN — FAMOTIDINE 20 MG: 10 INJECTION, SOLUTION INTRAVENOUS at 08:07

## 2023-07-29 RX ADMIN — FENOFIBRATE 48 MG: 48 TABLET, FILM COATED ORAL at 08:07

## 2023-07-29 RX ADMIN — CEFTRIAXONE SODIUM 2 G: 2 INJECTION, POWDER, FOR SOLUTION INTRAMUSCULAR; INTRAVENOUS at 12:07

## 2023-07-29 RX ADMIN — GUAIFENESIN 400 MG: 200 SOLUTION ORAL at 10:07

## 2023-07-29 RX ADMIN — CLEVIPIDINE 2 MG/HR: 0.5 EMULSION INTRAVENOUS at 08:07

## 2023-07-29 RX ADMIN — AMLODIPINE BESYLATE 10 MG: 5 TABLET ORAL at 08:07

## 2023-07-29 NOTE — PROGRESS NOTES
[facility] - [facility]  Pulmonary Critical Care Note    Patient Name: Bianca Khan  MRN: [id number]  Admission Date: 6/28/2023  Hospital Length of Stay: 31 days  Code Status: Full Code  Attending Provider: Khris Treadwell Jr., MD, *  Primary Care Provider: Areli Vargas PA-C     Subjective:     HPI:     Bianca Khan is a 58 y.o. male with PMH of paraplegia 2/2 spinal cord injury (T1-T6), neurogenic bladder with suprapubic catheter, chronic right ankle osteomyelitis, DM II, HTN, who presented to the ED on 6/28/2023 with CC of right knee pain. Per chart review, CT of right knee revealed 5 cm area of subcutaneous fluid in prepatellar region which was aspirated in the ED; he was taken to the OR on 6/29/2023 by orthopedic surgery team and was found to have prepatellar bursa infection and septic arthritis. Wound culture 6/29/2023 positive for strep agalactiae. Orthopedic surgery team recommended right-sided above-knee amputation d/t chronically infected hardware in right lower extremity. On 7/4/2023, a RRT was called d/t acute respiratory distress that was not improving despite being placed on vapotherm at 35 L/min with FiO2 100%. At the time of examination, patient's initial GCS was 10 but progressively reduced to a GCS of 6. Shared decision with patient's wife was made to intubate patient for airway protection though ABGs were within normal limits. He was admitted to the ICU for close monitoring and further medical management.  The patient underwent incision and drainage of his right knee on 06/29.  He was admitted to the ICU and intubated for airway protection due to altered mental status on 7/4 with placement of hemodialysis catheter with initiation of hemodialysis.  Patient was subsequently extubated on 07/18 for the 2nd time.  He was reintubated on 07/21 secondary to worsening hypoxia and altered mental status with associated mucus plugging.  Status post bronchoscopy x3 since admission to the  ICU for hypoxia associated with mucus plugging.  He has been off of hemodialysis for several days now.    Hospital Course/Significant events:      24 Hour Interval History:    No acute events overnight.  His issues in getting consent yesterday so tracheostomy and PEG to was not performed.  Patient is intubated and on 50 mcg/kg/min propofol and 1 mcg/kg/hr of Precedex. 3.2 L of urine output over 24 hours and net -1.9 LFollows commands and able to answer yes and no questionswith head shakes and nods.    Past Medical History:   Diagnosis Date    Arthritis     Chronic ulcer of ankle 05/26/2022    Frequent UTI 07/02/2019    Generalized anxiety disorder 05/26/2022    Neurogenic bladder 05/26/2022    Osteomyelitis 05/26/2022    Presence of suprapubic catheter 05/26/2022    Pure hypercholesterolemia 05/26/2022    Retention of urine, unspecified 08/09/2019    Spinal cord injury at T1-T6 level 04/20/2018       Past Surgical History:   Procedure Laterality Date    INCISION AND DRAINAGE, LOWER EXTREMITY Right 6/29/2023    Procedure: INCISION AND DRAINAGE, LOWER EXTREMITY;  Surgeon: Prabhu Shen DO;  Location: [facility];  Service: Orthopedics;  Laterality: Right;  supine bone foam wash stuff cultures    INSERTION OF INTRAMEDULLARY MARISA Right 8/10/2022    Procedure: INSERTION, INTRAMEDULLARY MARISA RIGHT TIBIA;  Surgeon: Jorge Orellana MD;  Location: [facility];  Service: Orthopedics;  Laterality: Right;       Social History     Socioeconomic History    Marital status:    Tobacco Use    Smoking status: Every Day     Current packs/day: 0.00     Types: Cigars, Cigarettes    Smokeless tobacco: Never   Substance and Sexual Activity    Alcohol use: Yes     Alcohol/week: 2.0 standard drinks of alcohol     Types: 2 Cans of beer per week    Drug use: Not Currently    Sexual activity: Never           Current Outpatient Medications   Medication Instructions    amitriptyline (ELAVIL) 50 mg, Oral, Nightly    amLODIPine (NORVASC) 10 MG  tablet 1 tablet    atorvastatin (LIPITOR) 20 mg, Oral, Nightly    busPIRone (BUSPAR) 5 MG Tab 1 tablet    carvediloL (COREG) 12.5 mg, Oral    cyclobenzaprine (FLEXERIL) 10 mg, Oral, 2 times daily PRN    doxycycline (VIBRAMYCIN) 100 mg, Oral, Daily    fenofibrate 160 mg, Oral    FLUoxetine 20 mg, Oral    gabapentin (NEURONTIN) 300 MG capsule 1 capsule    lisinopriL (PRINIVIL,ZESTRIL) 10 mg, Oral, Nightly    lisinopriL 10 mg, Oral, Daily    LORazepam (ATIVAN) 1 mg, Oral, 2 times daily    melatonin (MELATIN) 3 mg tablet TAKE TWO TABLETS BY MOUTH EVERY NIGHT AT BEDTIME AS NEEDED FOR INSOMNIA.    meloxicam (MOBIC) 15 mg, Oral, Daily    metFORMIN (GLUCOPHAGE) 500 MG tablet SMARTSI Tablet(s) By Mouth Morning-Evening    oxybutynin (DITROPAN) 5 mg, Oral, 2 times daily    oxyCODONE-acetaminophen (PERCOCET)  mg per tablet 1 tablet, Oral, Every 6 hours PRN    pantoprazole (PROTONIX) 40 MG tablet 1 tablet    temazepam (RESTORIL) 30 mg, Oral, Nightly    traZODone (DESYREL) 50 mg, Oral, Nightly    XARELTO 20 mg Tab SMARTSI Tablet(s) By Mouth Every Evening       Current Inpatient Medications   albuterol-ipratropium  3 mL Nebulization BID    amLODIPine  10 mg Per NG tube Daily    atorvastatin  20 mg Per NG tube Nightly    carvediloL  25 mg Per NG tube BID    cefTRIAXone (ROCEPHIN) IVPB  2 g Intravenous Q24H    cloNIDine 0.2 mg/24 hr td ptwk  1 patch Transdermal Q7 Days    doxycycline  100 mg Per NG tube Q12H    enoxaparin  30 mg Subcutaneous Q12H    famotidine (PF)  20 mg Intravenous Daily    fenofibrate  48 mg Per NG tube Daily    guaiFENesin 100 mg/5 ml  400 mg Per NG tube Q4H    miconazole NITRATE 2 %   Topical (Top) BID    oxybutynin  5 mg Per NG tube BID    senna-docusate 8.6-50 mg  2 tablet Per NG tube BID    sodium chloride 3%  4 mL Nebulization BID    sodium citrate-citric acid 500-334 mg/5 ml  30 mL Oral Once       Current Intravenous Infusions   clevidipine 4 mg/hr (23 0308)    dexmedeTOMIDine (Precedex)  infusion (titrating) 1 mcg/kg/hr (07/29/23 0211)    NORepinephrine bitartrate-D5W Stopped (07/12/23 1039)    propofoL 50 mcg/kg/min (07/29/23 0210)         ROS     Unable to obtain as patient is intubated  Objective:       Intake/Output Summary (Last 24 hours) at 7/29/2023 0413  Last data filed at 7/29/2023 0400  Gross per 24 hour   Intake 3467.28 ml   Output 3275 ml   Net 192.28 ml           Vital Signs (Most Recent):  Temp: 97.9 °F (36.6 °C) (07/29/23 0400)  Pulse: 64 (07/29/23 0400)  Resp: 20 (07/29/23 0400)  BP: 125/70 (07/29/23 0400)  SpO2: 97 % (07/29/23 0400)  Body mass index is 27.51 kg/m².  Weight: 86.2 kg (190 lb) Vital Signs (24h Range):  Temp:  [97.9 °F (36.6 °C)-99.4 °F (37.4 °C)] 97.9 °F (36.6 °C)  Pulse:  [60-84] 64  Resp:  [17-23] 20  SpO2:  [95 %-100 %] 97 %  BP: (116-156)/(65-89) 125/70     Physical Exam  General:   no acute respiratory distress  Head: Normocephalic, atraumatic  Eyes: PERRL, EOMI, anicteric sclera  Neck: supple, normal ROM, no thyromegaly   CVS:  Irregularly irregular 2+ peripheral pulses  Resp:  Lungs clear to auscultation bilaterally, no wheezes, rales, or rhonci  GI:  Abdomen soft, non-tender, non-distended, normoactive bowel sounds  MSK:  Wound VAC right knee  Skin:  No rashes, ulcers, erythema  Neuro:  Cough, gag, cornea and pupillary reflex intact      Lines/Drains/Airways       Drain  Duration                  Suprapubic Catheter 07/06/23 1544 100% silicone 22 Fr. 22 days         NG/OG Tube 07/21/23 1000 [place] sump 16 Fr. Left nostril 7 days              Airway  Duration                  Airway - Non-Surgical 07/21/23 1655 7 days              Peripheral Intravenous Line  Duration                  Midline Catheter Insertion/Assessment  - Single Lumen 07/22/23 1603 Left basilic vein (medial side of arm) other (see comments) 6 days         Peripheral IV - Single Lumen 07/27/23 2145 20 G Posterior;Left Hand 1 day                    Significant Labs:    Lab Results   Component  Value Date    WBC 6.24 07/28/2023    HGB 8.5 (L) 07/28/2023    HCT 27.7 (L) 07/28/2023    MCV 81.5 07/28/2023     (H) 07/28/2023         BMP  Lab Results   Component Value Date     07/29/2023    K 4.0 07/29/2023    CHLORIDE 110 (H) 07/29/2023    CO2 24 07/29/2023    BUN 22.3 07/29/2023    CREATININE 0.75 07/29/2023    CALCIUM 8.7 07/29/2023    AGAP 9.0 07/29/2023    ESTGFRAFRICA 101 05/11/2022    EGFRNONAA >60 04/27/2022       ABG  Recent Labs   Lab 07/22/23  0439   PH 7.480*   PO2 78.0*   HCO3 28.3*         Mechanical Ventilation Support:  Vent Mode: A/C (07/29/23 0300)  Ventilator Initiated: Yes (07/21/23 1655)  Set Rate: 20 BPM (07/29/23 0300)  Vt Set: 450 mL (07/29/23 0300)  Pressure Support: 12 cmH20 (07/18/23 1031)  PEEP/CPAP: 5 cmH20 (07/29/23 0300)  Oxygen Concentration (%): 30 (07/29/23 0400)  Peak Airway Pressure: 16 cmH20 (07/29/23 0300)  Total Ve: 8.8 L/m (07/29/23 0300)  F/VT Ratio<105 (RSBI): (!) 46.51 (07/29/23 0300)    Significant Imaging:  I have reviewed the pertinent imaging within the past 24 hours.        Assessment/Plan:     Assessment  -prepatellar bursitis with septic arthritis of the right knee with Streptococcus agalactiae on culture status post incision and drainage on 06/29/2023 with wound VAC in place currently on suppressive antibiotics with doxycycline  acute hypoxemic respiratory failure with associated pneumonia in addition to pulmonary edema on mechanical ventilation status post intubation x3  acute kidney injury with chronic kidney disease stage 4 status post several sessions hemodialysis currently not receiving hemodialysis   hypoactive delirium   paroxysmal atrial fibrillation with permanent pacemaker in place   history of paraplegia secondary to spinal cord injury at level T1 with associated neurogenic bladder and chronic right ankle osteomyelitis  diabetes mellitus type 2   hypertension      Plan  - titrate mechanical ventilation for ARDS net protocol using  ABGs  -supplement oxygen to maintain saturation 94-96%   -plan for tracheostomy to and PEG tube placement together to be scheduled by ENT and surgery likely Monday  -continue wound VAC per orthopedic surgery recommendations and Infectious Disease recommendations with suppressive antibiotics to finish on 08/09, on doxycycline    DVT Prophylaxis: lovenox  GI Prophylaxis: Pepcid     32 minutes of critical care was time spent personally by me on the following activities: development of treatment plan with patient or surrogate and bedside caregivers, discussions with consultants, evaluation of patient's response to treatment, examination of patient, ordering and performing treatments and interventions, ordering and review of laboratory studies, ordering and review of radiographic studies, pulse oximetry, re-evaluation of patient's condition.  This critical care time did not overlap with that of any other provider or involve time for any procedures.     George Shah MD  Pulmonary Critical Care Medicine  [facility] - [facility]

## 2023-07-29 NOTE — NURSING
Nurses Note -- 4 Eyes      7/28/2023   11:12 PM      Skin assessed during: Daily Assessment      [] No Altered Skin Integrity Present    [x]Prevention Measures Documented      [x] Yes- Altered Skin Integrity Present or Discovered   [] LDA Added if Not in Epic (Describe Wound)   [] New Altered Skin Integrity was Present on Admit and Documented in LDA   [] Wound Image Taken    Wound Care Consulted? Yes    Attending Nurse:  Lauren Townsend RN     Second RN/Staff Member:  Marilou Veloz RN

## 2023-07-29 NOTE — PROGRESS NOTES
NEPHROLOGY PROGRESS NOTE       Patient Name: Bianca ARMIJO.O.B. 1965    Date: 2023  Time: 10:24 AM      Reason for consult: ANTON       HPI: 57 yo M with paraplegia and bladder dysfunction with suprapubic catheter admitted with right knee septic joint. The pt had complicated hospital admission, involving acute respiratory failure and multiple intubations. Pt is currently intubated and in ICU setting.    Interval History: COVERAGE FOR DR. WIGGINS. Pt seen and examined at bedside this AM in ICU setting. Intubated. Non-oliguric.    Review of Systems:  Unable to obtain.        Current Facility-Administered Medications:     0.9%  NaCl infusion (for blood administration), , Intravenous, Q24H PRN, Omar Reza DO    acetaminophen tablet 650 mg, 650 mg, Per NG tube, Q8H PRN, Khris Treadwell Jr., MD, FCCP, 650 mg at 23 1336    albuterol-ipratropium 2.5 mg-0.5 mg/3 mL nebulizer solution 3 mL, 3 mL, Nebulization, BID, Luis Mcclure MD, 3 mL at 23 0751    amLODIPine tablet 10 mg, 10 mg, Per NG tube, Daily, Khris Treadwell Jr., MD, FCCP, 10 mg at 23 0810    atorvastatin tablet 20 mg, 20 mg, Per NG tube, Nightly, Khris Treadwell Jr., MD, FCCP, 20 mg at 23    carvediloL tablet 25 mg, 25 mg, Per NG tube, BID, Woody Azul DO, 25 mg at 23 0810    cefTRIAXone (ROCEPHIN) 2 g in dextrose 5 % in water (D5W) 5 % 100 mL IVPB (MB+), 2 g, Intravenous, Q24H, Khris Treadwell Jr., MD, FCCP, Stopped at 23 0044    clevidipine (CLEVIPREX) 25 mg/50 mL infusion, 1-21 mg/hr, Intravenous, Continuous, Woody Azul DO, Last Rate: 4 mL/hr at 23 0810, 2 mg/hr at 23 0810    cloNIDine 0.2 mg/24 hr td ptwk 1 patch, 1 patch, Transdermal, Q7 Days, Violeta Morton, ANP, 1 patch at 23 1029    dexmedetomidine (PRECEDEX) 400mcg/100mL 0.9% NaCL infusion, 0-1.4 mcg/kg/hr, Intravenous, Continuous, Favian Quiroz MD, Last Rate: 21.6 mL/hr at 23 0810, 1 mcg/kg/hr at 23 0810     doxycycline tablet 100 mg, 100 mg, Per NG tube, Q12H, Khris Treadwell Jr., MD, FCCP, 100 mg at 23 0810    enoxaparin injection 30 mg, 30 mg, Subcutaneous, Q12H, Khris Treadwell Jr., MD, FCCP, 30 mg at 23 0810    etomidate injection, , Intravenous, Code/trauma/sedation Med, Luis Mcclure MD, 30 mg at 23 1652    famotidine (PF) injection 20 mg, 20 mg, Intravenous, Daily, Lexis Duong DO, 20 mg at 23 0810    fenofibrate tablet 48 mg, 48 mg, Per NG tube, Daily, Khris Treadwell Jr., MD, FCCP, 48 mg at 23 0812    [] fentaNYL injection 50 mcg, 50 mcg, Intravenous, Q1H PRN, 50 mcg at 07/15/23 2117 **FOLLOWED BY** fentaNYL injection 50 mcg, 50 mcg, Intravenous, Q1H PRN, Jv Morgan MD, 50 mcg at 23 0405    fentaNYL injection, , Intravenous, Code/trauma/sedation Med, Luis Mcclure MD, 100 mcg at 23 1703    guaiFENesin 100 mg/5 ml syrup 400 mg, 400 mg, Per NG tube, Q4H, Khris Treadwell Jr., MD, FCCP, 400 mg at 23 0811    hydrALAZINE injection 10 mg, 10 mg, Intravenous, Q8H PRN, Guido Rivas MD, 10 mg at 23 1635    labetaloL injection 10 mg, 10 mg, Intravenous, Q4H PRN, Guido Rivas MD, 10 mg at 23 1218    miconazole NITRATE 2 % top powder, , Topical (Top), BID, Yissel Proctor MD, Given at 23 0812    NORepinephrine 8 mg in dextrose 5% 250 mL infusion, 0-3 mcg/kg/min, Intravenous, Continuous, Khris Treadwell Jr., MD, FCCP, Stopped at 23 1039    oxybutynin tablet 5 mg, 5 mg, Per NG tube, BID, Khris Treadwell Jr., MD, FCCP, 5 mg at 23 0810    oxyCODONE-acetaminophen  mg per tablet 1 tablet, 1 tablet, Per NG tube, Q6H PRN, Khris Treadwell Jr., MD, FCCP, 1 tablet at 23 1315    propofol (DIPRIVAN) 10 mg/mL infusion, 0-50 mcg/kg/min, Intravenous, Continuous, Luis Mcclure MD, Last Rate: 25.9 mL/hr at 23 0810, 50 mcg/kg/min at 23 0810    rocuronium injection, , Intravenous, Code/trauma/sedation  Med, Luis Mcclure MD, 100 mg at 07/21/23 1653    senna-docusate 8.6-50 mg per tablet 2 tablet, 2 tablet, Per NG tube, BID, Khris Treadwell Jr., MD, FCCP, 2 tablet at 07/29/23 0810    sodium chloride 0.9% bolus 250 mL 250 mL, 250 mL, Intravenous, PRN, Nemesio Hall MD    sodium chloride 0.9% flush 10 mL, 10 mL, Intravenous, PRN, Willy Martinez MD    sodium chloride 3% nebulizer solution 4 mL, 4 mL, Nebulization, BID, Luis Mcclure MD, 4 mL at 07/29/23 0751    sodium citrate-citric acid 500-334 mg/5 ml solution 30 mL, 30 mL, Oral, Once, Nabeel Tejada MD    Vital Signs (24 h):  Temp:  [97.9 °F (36.6 °C)-99.4 °F (37.4 °C)] 98.2 °F (36.8 °C)  Pulse:  [60-84] 68  Resp:  [17-23] 21  SpO2:  [95 %-100 %] 100 %  BP: (120-162)/(70-89) 146/78   I/O last 3 completed shifts:  In: 6138.3 [I.V.:2933.8; NG/GT:3070; IV Piggyback:134.5]  Out: 4175 [Urine:4175]  I/O this shift:  In: -   Out: 1200 [Urine:1200]        Physical Exam:  General: intubated  HEENT: + ETT  CVS: RRR   RS: mechanical breath sounds    Abdominal: Soft, NT/ND.  Extremities: No edema b/l LE, right knee wound vac  Skin: No rash, no lesions.  Dialysis Access: None    Results:    Lab Results   Component Value Date     07/29/2023    K 4.0 07/29/2023     05/11/2022    CO2 24 07/29/2023    BUN 22.3 07/29/2023    CREATININE 0.75 07/29/2023    CALCIUM 8.7 07/29/2023    PHOS 4.1 07/28/2023    WBC 6.24 07/28/2023    HGB 8.5 (L) 07/28/2023    HCT 27.7 (L) 07/28/2023     (H) 07/28/2023       Assessment and Plan:      1   ANTON / ATN    improved  2   CKD 3  3   SHPT  4   Pre Renal    improved  5   hypernatremia   improved  6   paraplegia  7   A Fib   8   DM2  9   respiratory failure on vent  10  Right knee septic joint  11  moderate protein calorie malnutrition      Plan  - No changes. Continue ICU management.  - Monitor labs. Expect hypernatremia to worsen if UOP remains high. May require D5W infusion tomorrow.    Thank you for your consult.  Please feel free to reach me with any questions.  Plan for follow-up tomorrow.    Miguel Dale,   Interventional Nephrology  Cell: [phone]

## 2023-07-29 NOTE — NURSING
Nurses Note -- 4 Eyes      7/29/2023   5:59 PM      Skin assessed during: Daily Assessment      [] No Altered Skin Integrity Present    [x]Prevention Measures Documented      [x] Yes- Altered Skin Integrity Present or Discovered   [] LDA Added if Not in Epic (Describe Wound)   [] New Altered Skin Integrity was Present on Admit and Documented in LDA   [] Wound Image Taken    Wound Care Consulted? Yes    Attending Nurse:  Rigoberto Benson RN     Second RN/Staff Member:  Marlyn Lemon RN

## 2023-07-29 NOTE — PROGRESS NOTES
Infectious Diseases Progress Note  58-year-old male with past medical history of T-spine cord injury with paraplegia, diabetes, HTN, hepatitis-C, chronic right ankle wound/osteomyelitis, was on suppressive doxycycline, known to my service, seen by us initially at [facility] and has followed with us at the office for chronic osteomyelitis, is admitted to [facility] on 06/28/2023 presenting through the ED with complaints of pain and swelling to his right knee, apparently struck his knee.  He did also report prior remote right knee surgery.  He was evaluated and noted to have no fevers initially but a temperature of 100.2° today 6/29, no leukocytosis but with thrombocytosis of up to 531 today.  .2 and ESR >130.  He is anemic with low albumin.  Blood cultures have been negative.  CT scan of the right knee noted a 5 cm area of subcutaneous fluid collection in the prepatellar region.  There was aspiration in the ER with findings of purulent fluid and cultures showing group B Streptococcus.  He was seen by the orthopedic surgery team with findings of right prepatellar bursa abscess and is status post incision and drainage of right knee septic arthritis and right prepatellar bursa abscess today 6/29 with group B Streptococcus  He is on ceftriaxone.    Subjective:  No new complaints, low-grade temp noted, doing about the same.  Remains in the ICU, intubated on ventilator in no acute distress      Past Medical History:   Diagnosis Date    Arthritis     Chronic ulcer of ankle 05/26/2022    Frequent UTI 07/02/2019    Generalized anxiety disorder 05/26/2022    Neurogenic bladder 05/26/2022    Osteomyelitis 05/26/2022    Presence of suprapubic catheter 05/26/2022    Pure hypercholesterolemia 05/26/2022    Retention of urine, unspecified 08/09/2019    Spinal cord injury at T1-T6 level 04/20/2018     Past Surgical History:   Procedure Laterality Date    INCISION AND DRAINAGE, LOWER  EXTREMITY Right 6/29/2023    Procedure: INCISION AND DRAINAGE, LOWER EXTREMITY;  Surgeon: Prabhu Shen DO;  [place] [facility] [place];  Service: Orthopedics;  Laterality: Right;  supine bone foam wash stuff cultures    INSERTION OF INTRAMEDULLARY MARISA Right 8/10/2022    Procedure: INSERTION, INTRAMEDULLARY MARISA RIGHT TIBIA;  Surgeon: Jorge Orellana MD;  [place] [facility] [place];  Service: Orthopedics;  Laterality: Right;     Social History     Socioeconomic History    Marital status:    Tobacco Use    Smoking status: Every Day     Types: Cigars, Cigarettes    Smokeless tobacco: Never   Substance and Sexual Activity    Alcohol use: Yes     Alcohol/week: 2.0 standard drinks     Types: 2 Cans of beer per week    Drug use: Not Currently    Sexual activity: Never       Review of Systems   Unable to perform ROS: Intubated       Review of patient's allergies indicates:   Allergen Reactions    Baclofen Itching and Anxiety         Scheduled Meds:   albuterol-ipratropium  3 mL Nebulization BID    amLODIPine  10 mg Per NG tube Daily    atorvastatin  20 mg Per NG tube Nightly    carvediloL  25 mg Per NG tube BID    cefTRIAXone (ROCEPHIN) IVPB  2 g Intravenous Q24H    cloNIDine 0.2 mg/24 hr td ptwk  1 patch Transdermal Q7 Days    doxycycline  100 mg Per NG tube Q12H    enoxaparin  30 mg Subcutaneous Q12H    famotidine (PF)  20 mg Intravenous Daily    fenofibrate  48 mg Per NG tube Daily    guaiFENesin 100 mg/5 ml  400 mg Per NG tube Q4H    miconazole NITRATE 2 %   Topical (Top) BID    oxybutynin  5 mg Per NG tube BID    senna-docusate 8.6-50 mg  2 tablet Per NG tube BID    sodium chloride 3%  4 mL Nebulization BID    sodium citrate-citric acid 500-334 mg/5 ml  30 mL Oral Once     Continuous Infusions:   clevidipine 2 mg/hr (07/28/23 0846)    dexmedeTOMIDine (Precedex) infusion (titrating) 1 mcg/kg/hr (07/28/23 2019)    NORepinephrine bitartrate-D5W Stopped (07/12/23 1039)    propofoL 50 mcg/kg/min (07/28/23 2154)     PRN Meds:sodium  "chloride, acetaminophen, etomidate, [] fentaNYL **FOLLOWED BY** fentaNYL, fentaNYL, hydrALAZINE, labetalol, oxyCODONE-acetaminophen, rocuronium, sodium chloride 0.9%, sodium chloride 0.9%    Objective:  BP (!) 149/78   Pulse 66   Temp 99.4 °F (37.4 °C) (Oral)   Resp (!) 22   Ht 5' 9.69" (1.77 m)   Wt 86.2 kg (190 lb)   SpO2 100%   BMI 27.51 kg/m²     Physical Exam:   Physical Exam  Vitals reviewed.   HENT:      Head: Normocephalic.   Cardiovascular:      Rate and Rhythm: Normal rate and regular rhythm.   Pulmonary:      Effort: Pulmonary effort is normal. No respiratory distress.      Breath sounds: B/L BS coarse. No wheezing.      Comments: On vent  Abdominal:      General: Bowel sounds are normal. There is no distension.     Palpations: Abdomen is soft.      Tenderness: There is no abdominal tenderness.   Genitourinary:     Comments: Suprapubic catheter  Musculoskeletal:      Cervical back: Normal range of motion.      Comments: Paraplegic   Skin:     Findings: No rash.      Comments: Wounds dressed. R knee with wound vac in place   Neurological:      Mental Status: Unable to fully assess, pt intubated and on vent  Psychiatric:         Behavior: Sedated     Imaging  Imaging Results              CT Knee W W/O Contrast Right (Final result)  Result time 23 18:37:42      Final result by Gustavo Cassidy MD (23 18:37:42)                   Impression:      Mildly limited assessment.  5 cm area of subcutaneous fluid in the prepatellar region may have thin peripheral enhancement.  Fluid collection is possible.    Subcutaneous edema in the calf.      Electronically signed by: Gustavo Cassidy  Date:    2023  Time:    18:37               Narrative:    EXAMINATION:  CT KNEE W W/O CONTRAST RIGHT    CLINICAL HISTORY:  possible infection;    TECHNIQUE:  CT imaging of the right knee before and after IV contrast.  Axial, coronal and sagittal reformatted images reviewed.   Dose length product is 585 " mGycm. Automatic exposure control, adjustment of mA/kV or iterative reconstruction technique used to limit radiation dose.    COMPARISON:  Radiograph 06/28/2023    FINDINGS:  Assessment mildly limited by motion.  Joint alignments are maintained with degenerative changes at the knee.  Fixation hardware in the lower femur and tibia with remote proximal fibular fracture.  Areas of heterotopic ossification along the lower portion of the femur.  Small knee effusion.  Areas of subcutaneous edema anteriorly below the knee.  More focal area of fluid in the subcutaneous tissues of the prepatellar region measures up to 5 cm in transverse diameter and 5 cm in length.  There is image noise from the hardware, but this fluid may have thin areas of peripheral enhancement.  No tracking subcutaneous gas.                                       X-Ray Tibia Fibula 2 View Right (Final result)  Result time 06/28/23 18:14:06      Final result by Tracy Zamudio MD (06/28/23 18:14:06)                   Impression:      Posttraumatic and postsurgical changes at the femur and lower leg.  There is chronic deformity at the distal femur with heterogeneous sclerosis and lucency superimposed on background bony demineralization.  No old imaging of this region available for comparison.  This makes it difficult to evaluate for acute superimposed lytic change.    Postsurgical and posttraumatic change at the tibia and fibula with bony demineralization.  No appreciable acute osseous abnormality.      Electronically signed by: Tracy Zamudio  Date:    06/28/2023  Time:    18:14               Narrative:    EXAMINATION:  XR FEMUR 2 VIEW RIGHT; XR TIBIA FIBULA 2 VIEW RIGHT    CLINICAL HISTORY:  possible infection;; infection;    TECHNIQUE:  AP and lateral views of the right femur were performed.    AP and lateral views of the right tibia and fibula were obtained.    COMPARISON:  Knee radiographs 06/28/2023, tibia and fibular radiographs  08/10/2022    FINDINGS:  There are postsurgical changes of ORIF at the distal femur with lateral plate and screws.  There are postsurgical changes of ORIF at the tibia with antegrade intramedullary raven and proximal and distal interlocking screws.  Hardware appears intact.  No acute fracture seen.  There are old, healed fracture deformities.    There is chronic remodeling at the distal femur with heterogeneous appearance of the marrow and cortex distally.  There are areas of lucency as well as sclerosis.  There is no imaging through the distal femur available for comparison to evaluate for acute lytic changes superimposed on chronic background posttraumatic and postsurgical change.  Older prior radiographs of the tibia and fibula do not adequately imaged the area.  The bones are demineralized.    There is soft tissue swelling at the knee.  There is soft tissue swelling at the ankle.                                       X-Ray Femur 2 View Right (Final result)  Result time 06/28/23 18:14:06      Final result by Tracy Zamudio MD (06/28/23 18:14:06)                   Impression:      Posttraumatic and postsurgical changes at the femur and lower leg.  There is chronic deformity at the distal femur with heterogeneous sclerosis and lucency superimposed on background bony demineralization.  No old imaging of this region available for comparison.  This makes it difficult to evaluate for acute superimposed lytic change.    Postsurgical and posttraumatic change at the tibia and fibula with bony demineralization.  No appreciable acute osseous abnormality.      Electronically signed by: Tracy Zamudio  Date:    06/28/2023  Time:    18:14               Narrative:    EXAMINATION:  XR FEMUR 2 VIEW RIGHT; XR TIBIA FIBULA 2 VIEW RIGHT    CLINICAL HISTORY:  possible infection;; infection;    TECHNIQUE:  AP and lateral views of the right femur were performed.    AP and lateral views of the right tibia and fibula were  obtained.    COMPARISON:  Knee radiographs 06/28/2023, tibia and fibular radiographs 08/10/2022    FINDINGS:  There are postsurgical changes of ORIF at the distal femur with lateral plate and screws.  There are postsurgical changes of ORIF at the tibia with antegrade intramedullary raven and proximal and distal interlocking screws.  Hardware appears intact.  No acute fracture seen.  There are old, healed fracture deformities.    There is chronic remodeling at the distal femur with heterogeneous appearance of the marrow and cortex distally.  There are areas of lucency as well as sclerosis.  There is no imaging through the distal femur available for comparison to evaluate for acute lytic changes superimposed on chronic background posttraumatic and postsurgical change.  Older prior radiographs of the tibia and fibula do not adequately imaged the area.  The bones are demineralized.    There is soft tissue swelling at the knee.  There is soft tissue swelling at the ankle.                                       X-Ray Knee 3 View Right (Final result)  Result time 06/28/23 14:18:03      Final result by Lanette Waller MD (06/28/23 14:18:03)                   Impression:      1. No acute bony abnormality.  2. Soft tissue swelling around the knee.      Electronically signed by: Lanette Waller  Date:    06/28/2023  Time:    14:18               Narrative:    EXAMINATION:  XR KNEE 3 VIEW RIGHT    CLINICAL HISTORY:  Effusion, right knee    COMPARISON:  None.    FINDINGS:  There has been prior internal fixation of the femur and tibia.  There are chronic changes of the bones with heterotopic ossification around the knee.  There is no acute fracture identified.  There is soft tissue swelling around the knee.                                       Lab Review   Recent Results (from the past 24 hour(s))   Renal Function Panel    Collection Time: 07/28/23  1:52 AM   Result Value Ref Range    Sodium Level 145 136 - 145 mmol/L     Potassium Level 4.4 3.5 - 5.1 mmol/L    Chloride 113 (H) 98 - 107 mmol/L    Carbon Dioxide 24 22 - 29 mmol/L    Glucose Level 142 (H) 74 - 100 mg/dL    Blood Urea Nitrogen 29.8 (H) 8.4 - 25.7 mg/dL    Creatinine 0.82 0.73 - 1.18 mg/dL    Calcium Level Total 8.9 8.4 - 10.2 mg/dL    Albumin Level 2.2 (L) 3.5 - 5.0 g/dL    Phosphorus Level 4.1 2.3 - 4.7 mg/dL    eGFR >60 mls/min/1.73/m2   Magnesium    Collection Time: 07/28/23  1:52 AM   Result Value Ref Range    Magnesium Level 1.70 1.60 - 2.60 mg/dL   CBC with Differential    Collection Time: 07/28/23  1:52 AM   Result Value Ref Range    WBC 6.24 4.50 - 11.50 x10(3)/mcL    RBC 3.40 (L) 4.70 - 6.10 x10(6)/mcL    Hgb 8.5 (L) 14.0 - 18.0 g/dL    Hct 27.7 (L) 42.0 - 52.0 %    MCV 81.5 80.0 - 94.0 fL    MCH 25.0 (L) 27.0 - 31.0 pg    MCHC 30.7 (L) 33.0 - 36.0 g/dL    RDW 23.9 (H) 11.5 - 17.0 %    Platelet 591 (H) 130 - 400 x10(3)/mcL    MPV 9.8 7.4 - 10.4 fL    Neut % 61.7 %    Lymph % 23.1 %    Mono % 12.2 %    Eos % 2.2 %    Basophil % 0.3 %    Lymph # 1.44 0.6 - 4.6 x10(3)/mcL    Neut # 3.85 2.1 - 9.2 x10(3)/mcL    Mono # 0.76 0.1 - 1.3 x10(3)/mcL    Eos # 0.14 0 - 0.9 x10(3)/mcL    Baso # 0.02 <=0.2 x10(3)/mcL    IG# 0.03 0 - 0.04 x10(3)/mcL    IG% 0.5 %    NRBC% 0.0 %             Assessment/Plan:  1. SIRS with fevers  2. Group B Streptococcus Right knee prepatellar abscess  3. Group B Streptococcus Right knee septic arthritis  4.  Anemia/reactive thrombocytosis  5.  Paraplegia secondary to T-spine cord injury  6. History of hepatitis-C   7. Chronic right ankle wound/osteomyelitis  8.  Diabetes    9.  Acute kidney injury  10. Acute hypoxic respiratory failure  11. Pulmonary edema with pleural effusions/ Pneumonitis      -Continue ceftriaxone with end date of 08/09 and maintainance chronic suppressive doxycycline  -Afebrile without leukocytosis but with worsening reactive thrombocytosis, follow  -Stool for c-diff PCR and toxin (-)  -7/4 and 7/8 blood cultures negative  and sputum culture with moderate yeast.    -7/4 CT scan of the abdomen and pelvis and 7/3 chest x-ray results noted, likely dealing with pulmonary edema with pleural effusions and possibly superimposed infectious pneumonitis.   -6/28 right knee abscess culture with Group B Streptococcus  -Seen by Orthopedic surgery team s/p I&D of R prepatellar bursa abscess and septic R knee with cultures yielding Group G Streptococcus  -6/28 blood cultures negative  -Multiple comorbidities, paraplegic with chronic pressure wounds, right ankle osteomyelitis with exposed bone on chronic suppressive doxycycline  -We will continue surgical site care per Orthopedic surgery team  -We will continue wound care  -He is to continue management of his hepatitis-C as outpatient  -Renal impairment noted, HD per Nephrology, started 7/4  -7/3 Renal ultrasound results noted  -Re-intubated on 7/21 followed by Bronchoscopy with mucus plugging noted  -Plan for PEG and tracheostomy placement postponed till next week  -Continue vent management and ICU support per Intensivist  -Discussed with nursing staff.

## 2023-07-30 LAB
ALLENS TEST BLOOD GAS (OHS): ABNORMAL
ANION GAP SERPL CALC-SCNC: 10 MEQ/L
BASE EXCESS BLD CALC-SCNC: 6.4 MMOL/L
BASOPHILS # BLD AUTO: 0.02 X10(3)/MCL
BASOPHILS NFR BLD AUTO: 0.3 %
BLOOD GAS SAMPLE TYPE (OHS): ABNORMAL
BUN SERPL-MCNC: 18.9 MG/DL (ref 8.4–25.7)
CA-I BLD-SCNC: 1.22 MMOL/L (ref 1.12–1.23)
CALCIUM SERPL-MCNC: 9.3 MG/DL (ref 8.4–10.2)
CHLORIDE SERPL-SCNC: 104 MMOL/L (ref 98–107)
CO2 BLDA-SCNC: 32.7 MMOL/L
CO2 SERPL-SCNC: 27 MMOL/L (ref 22–29)
CREAT SERPL-MCNC: 0.76 MG/DL (ref 0.73–1.18)
CREAT/UREA NIT SERPL: 25
DRAWN BY BLOOD GAS (OHS): ABNORMAL
EOSINOPHIL # BLD AUTO: 0.14 X10(3)/MCL (ref 0–0.9)
EOSINOPHIL NFR BLD AUTO: 2.3 %
ERYTHROCYTE [DISTWIDTH] IN BLOOD BY AUTOMATED COUNT: 22.5 % (ref 11.5–17)
FIO2 BLOOD GAS (OHS): 30 %
GFR SERPLBLD CREATININE-BSD FMLA CKD-EPI: >60 MLS/MIN/1.73/M2
GLUCOSE SERPL-MCNC: 140 MG/DL (ref 74–100)
HCO3 BLDA-SCNC: 31.3 MMOL/L
HCT VFR BLD AUTO: 29.2 % (ref 42–52)
HGB BLD-MCNC: 9.2 G/DL (ref 14–18)
IMM GRANULOCYTES # BLD AUTO: 0.03 X10(3)/MCL (ref 0–0.04)
IMM GRANULOCYTES NFR BLD AUTO: 0.5 %
LYMPHOCYTES # BLD AUTO: 1.74 X10(3)/MCL (ref 0.6–4.6)
LYMPHOCYTES NFR BLD AUTO: 28.8 %
MAGNESIUM SERPL-MCNC: 1.4 MG/DL (ref 1.6–2.6)
MCH RBC QN AUTO: 24.6 PG (ref 27–31)
MCHC RBC AUTO-ENTMCNC: 31.5 G/DL (ref 33–36)
MCV RBC AUTO: 78.1 FL (ref 80–94)
MECH RR (OHS): 20 B/MIN
MECH VT (OHS): 450 ML
MODE (OHS): AC
MONOCYTES # BLD AUTO: 0.46 X10(3)/MCL (ref 0.1–1.3)
MONOCYTES NFR BLD AUTO: 7.6 %
NEUTROPHILS # BLD AUTO: 3.65 X10(3)/MCL (ref 2.1–9.2)
NEUTROPHILS NFR BLD AUTO: 60.5 %
NRBC BLD AUTO-RTO: 0 %
PCO2 BLDA: 45 MMHG (ref 35–45)
PEEP (OHS): 5 CMH2O
PH BLDA: 7.45 [PH] (ref 7.35–7.45)
PHOSPHATE SERPL-MCNC: 4.7 MG/DL (ref 2.3–4.7)
PLATELET # BLD AUTO: 653 X10(3)/MCL (ref 130–400)
PMV BLD AUTO: 10 FL (ref 7.4–10.4)
PO2 BLDA: 56 MMHG (ref 80–100)
POTASSIUM BLOOD GAS (OHS): 3.3 MMOL/L (ref 3.5–5)
POTASSIUM SERPL-SCNC: 4 MMOL/L (ref 3.5–5.1)
RBC # BLD AUTO: 3.74 X10(6)/MCL (ref 4.7–6.1)
SAMPLE SITE BLOOD GAS (OHS): ABNORMAL
SAO2 % BLDA: 90 %
SODIUM BLOOD GAS (OHS): 138 MMOL/L (ref 137–145)
SODIUM SERPL-SCNC: 141 MMOL/L (ref 136–145)
WBC # SPEC AUTO: 6.04 X10(3)/MCL (ref 4.5–11.5)

## 2023-07-30 PROCEDURE — 80048 BASIC METABOLIC PNL TOTAL CA: CPT

## 2023-07-30 PROCEDURE — 63600175 PHARM REV CODE 636 W HCPCS: Performed by: INTERNAL MEDICINE

## 2023-07-30 PROCEDURE — 99232 SBSQ HOSP IP/OBS MODERATE 35: CPT | Mod: ,,, | Performed by: STUDENT IN AN ORGANIZED HEALTH CARE EDUCATION/TRAINING PROGRAM

## 2023-07-30 PROCEDURE — 25000242 PHARM REV CODE 250 ALT 637 W/ HCPCS: Performed by: INTERNAL MEDICINE

## 2023-07-30 PROCEDURE — C9248 INJ, CLEVIDIPINE BUTYRATE: HCPCS | Mod: JZ,JG

## 2023-07-30 PROCEDURE — 25000003 PHARM REV CODE 250

## 2023-07-30 PROCEDURE — 63600175 PHARM REV CODE 636 W HCPCS

## 2023-07-30 PROCEDURE — 99900026 HC AIRWAY MAINTENANCE (STAT)

## 2023-07-30 PROCEDURE — 82803 BLOOD GASES ANY COMBINATION: CPT

## 2023-07-30 PROCEDURE — 99900031 HC PATIENT EDUCATION (STAT)

## 2023-07-30 PROCEDURE — 99232 PR SUBSEQUENT HOSPITAL CARE,LEVL II: ICD-10-PCS | Mod: ,,, | Performed by: STUDENT IN AN ORGANIZED HEALTH CARE EDUCATION/TRAINING PROGRAM

## 2023-07-30 PROCEDURE — 25000003 PHARM REV CODE 250: Performed by: INTERNAL MEDICINE

## 2023-07-30 PROCEDURE — 63600175 PHARM REV CODE 636 W HCPCS: Mod: JZ,JG

## 2023-07-30 PROCEDURE — 94761 N-INVAS EAR/PLS OXIMETRY MLT: CPT

## 2023-07-30 PROCEDURE — 94003 VENT MGMT INPAT SUBQ DAY: CPT

## 2023-07-30 PROCEDURE — 27100171 HC OXYGEN HIGH FLOW UP TO 24 HOURS

## 2023-07-30 PROCEDURE — 85025 COMPLETE CBC W/AUTO DIFF WBC: CPT

## 2023-07-30 PROCEDURE — 20000000 HC ICU ROOM

## 2023-07-30 PROCEDURE — 84100 ASSAY OF PHOSPHORUS: CPT

## 2023-07-30 PROCEDURE — 25000003 PHARM REV CODE 250: Performed by: STUDENT IN AN ORGANIZED HEALTH CARE EDUCATION/TRAINING PROGRAM

## 2023-07-30 PROCEDURE — 94640 AIRWAY INHALATION TREATMENT: CPT

## 2023-07-30 PROCEDURE — 83735 ASSAY OF MAGNESIUM: CPT

## 2023-07-30 PROCEDURE — 36600 WITHDRAWAL OF ARTERIAL BLOOD: CPT

## 2023-07-30 PROCEDURE — 99900035 HC TECH TIME PER 15 MIN (STAT)

## 2023-07-30 RX ORDER — MAGNESIUM SULFATE HEPTAHYDRATE 40 MG/ML
4 INJECTION, SOLUTION INTRAVENOUS ONCE
Status: COMPLETED | OUTPATIENT
Start: 2023-07-30 | End: 2023-07-30

## 2023-07-30 RX ORDER — MAGNESIUM SULFATE HEPTAHYDRATE 40 MG/ML
2 INJECTION, SOLUTION INTRAVENOUS ONCE
Status: COMPLETED | OUTPATIENT
Start: 2023-07-30 | End: 2023-07-30

## 2023-07-30 RX ADMIN — GUAIFENESIN 400 MG: 200 SOLUTION ORAL at 10:07

## 2023-07-30 RX ADMIN — SENNOSIDES AND DOCUSATE SODIUM 2 TABLET: 50; 8.6 TABLET ORAL at 07:07

## 2023-07-30 RX ADMIN — PROPOFOL 50 MCG/KG/MIN: 10 INJECTION, EMULSION INTRAVENOUS at 11:07

## 2023-07-30 RX ADMIN — DOXYCYCLINE HYCLATE 100 MG: 100 TABLET, COATED ORAL at 07:07

## 2023-07-30 RX ADMIN — ENOXAPARIN SODIUM 30 MG: 30 INJECTION SUBCUTANEOUS at 07:07

## 2023-07-30 RX ADMIN — GUAIFENESIN 400 MG: 200 SOLUTION ORAL at 02:07

## 2023-07-30 RX ADMIN — CLEVIPIDINE 3 MG/HR: 0.5 EMULSION INTRAVENOUS at 04:07

## 2023-07-30 RX ADMIN — PROPOFOL 50 MCG/KG/MIN: 10 INJECTION, EMULSION INTRAVENOUS at 02:07

## 2023-07-30 RX ADMIN — PROPOFOL 50 MCG/KG/MIN: 10 INJECTION, EMULSION INTRAVENOUS at 05:07

## 2023-07-30 RX ADMIN — MAGNESIUM SULFATE HEPTAHYDRATE 4 G: 40 INJECTION, SOLUTION INTRAVENOUS at 07:07

## 2023-07-30 RX ADMIN — CEFTRIAXONE SODIUM 2 G: 2 INJECTION, POWDER, FOR SOLUTION INTRAMUSCULAR; INTRAVENOUS at 11:07

## 2023-07-30 RX ADMIN — PROPOFOL 50 MCG/KG/MIN: 10 INJECTION, EMULSION INTRAVENOUS at 10:07

## 2023-07-30 RX ADMIN — GUAIFENESIN 400 MG: 200 SOLUTION ORAL at 05:07

## 2023-07-30 RX ADMIN — CLEVIPIDINE 3 MG/HR: 0.5 EMULSION INTRAVENOUS at 07:07

## 2023-07-30 RX ADMIN — SENNOSIDES AND DOCUSATE SODIUM 2 TABLET: 50; 8.6 TABLET ORAL at 08:07

## 2023-07-30 RX ADMIN — ATORVASTATIN CALCIUM 20 MG: 10 TABLET, FILM COATED ORAL at 08:07

## 2023-07-30 RX ADMIN — FENOFIBRATE 48 MG: 48 TABLET, FILM COATED ORAL at 07:07

## 2023-07-30 RX ADMIN — DEXMEDETOMIDINE HYDROCHLORIDE 1 MCG/KG/HR: 400 INJECTION INTRAVENOUS at 07:07

## 2023-07-30 RX ADMIN — MAGNESIUM SULFATE HEPTAHYDRATE 2 G: 40 INJECTION, SOLUTION INTRAVENOUS at 03:07

## 2023-07-30 RX ADMIN — PROPOFOL 50 MCG/KG/MIN: 10 INJECTION, EMULSION INTRAVENOUS at 07:07

## 2023-07-30 RX ADMIN — SODIUM CHLORIDE 30 MG/ML INHALATION SOLUTION 4 ML: 30 SOLUTION INHALANT at 08:07

## 2023-07-30 RX ADMIN — CEFTRIAXONE SODIUM 2 G: 2 INJECTION, POWDER, FOR SOLUTION INTRAMUSCULAR; INTRAVENOUS at 12:07

## 2023-07-30 RX ADMIN — PROPOFOL 50 MCG/KG/MIN: 10 INJECTION, EMULSION INTRAVENOUS at 12:07

## 2023-07-30 RX ADMIN — CARVEDILOL 25 MG: 12.5 TABLET, FILM COATED ORAL at 08:07

## 2023-07-30 RX ADMIN — DEXMEDETOMIDINE HYDROCHLORIDE 1 MCG/KG/HR: 400 INJECTION INTRAVENOUS at 12:07

## 2023-07-30 RX ADMIN — GUAIFENESIN 400 MG: 200 SOLUTION ORAL at 04:07

## 2023-07-30 RX ADMIN — PROPOFOL 50 MCG/KG/MIN: 10 INJECTION, EMULSION INTRAVENOUS at 04:07

## 2023-07-30 RX ADMIN — FAMOTIDINE 20 MG: 10 INJECTION, SOLUTION INTRAVENOUS at 07:07

## 2023-07-30 RX ADMIN — OXYBUTYNIN CHLORIDE 5 MG: 5 TABLET ORAL at 07:07

## 2023-07-30 RX ADMIN — DEXMEDETOMIDINE HYDROCHLORIDE 1 MCG/KG/HR: 400 INJECTION INTRAVENOUS at 05:07

## 2023-07-30 RX ADMIN — DEXMEDETOMIDINE HYDROCHLORIDE 1 MCG/KG/HR: 400 INJECTION INTRAVENOUS at 04:07

## 2023-07-30 RX ADMIN — CLEVIPIDINE 3 MG/HR: 0.5 EMULSION INTRAVENOUS at 12:07

## 2023-07-30 RX ADMIN — GUAIFENESIN 400 MG: 200 SOLUTION ORAL at 01:07

## 2023-07-30 RX ADMIN — OXYBUTYNIN CHLORIDE 5 MG: 5 TABLET ORAL at 08:07

## 2023-07-30 RX ADMIN — MICONAZOLE NITRATE 2 % TOPICAL POWDER: at 07:07

## 2023-07-30 RX ADMIN — CARVEDILOL 25 MG: 12.5 TABLET, FILM COATED ORAL at 07:07

## 2023-07-30 RX ADMIN — AMLODIPINE BESYLATE 10 MG: 5 TABLET ORAL at 07:07

## 2023-07-30 RX ADMIN — MICONAZOLE NITRATE 2 % TOPICAL POWDER: at 08:07

## 2023-07-30 RX ADMIN — IPRATROPIUM BROMIDE AND ALBUTEROL SULFATE 3 ML: 2.5; .5 SOLUTION RESPIRATORY (INHALATION) at 08:07

## 2023-07-30 RX ADMIN — DOXYCYCLINE HYCLATE 100 MG: 100 TABLET, COATED ORAL at 08:07

## 2023-07-30 RX ADMIN — DEXMEDETOMIDINE HYDROCHLORIDE 1 MCG/KG/HR: 400 INJECTION INTRAVENOUS at 11:07

## 2023-07-30 NOTE — PROGRESS NOTES
[facility] - [facility] ICU  Pulmonary Critical Care Note    Patient Name: Bianca Khan  MRN: [id number]  Admission Date: 6/28/2023  Hospital Length of Stay: 32 days  Code Status: Full Code  Attending Provider: Khris Treadwell Jr., MD, *  Primary Care Provider: Areli Vargas PA-C     Subjective:     HPI:     Bianca Khan is a 58 y.o. male with PMH of paraplegia 2/2 spinal cord injury (T1-T6), neurogenic bladder with suprapubic catheter, chronic right ankle osteomyelitis, DM II, HTN, who presented to the ED on 6/28/2023 with CC of right knee pain. Per chart review, CT of right knee revealed 5 cm area of subcutaneous fluid in prepatellar region which was aspirated in the ED; he was taken to the OR on 6/29/2023 by orthopedic surgery team and was found to have prepatellar bursa infection and septic arthritis. Wound culture 6/29/2023 positive for strep agalactiae. Orthopedic surgery team recommended right-sided above-knee amputation d/t chronically infected hardware in right lower extremity. On 7/4/2023, a RRT was called d/t acute respiratory distress that was not improving despite being placed on vapotherm at 35 L/min with FiO2 100%. At the time of examination, patient's initial GCS was 10 but progressively reduced to a GCS of 6. Shared decision with patient's wife was made to intubate patient for airway protection though ABGs were within normal limits. He was admitted to the ICU for close monitoring and further medical management.  The patient underwent incision and drainage of his right knee on 06/29.  He was admitted to the ICU and intubated for airway protection due to altered mental status on 7/4 with placement of hemodialysis catheter with initiation of hemodialysis.  Patient was subsequently extubated on 07/18 for the 2nd time.  He was reintubated on 07/21 secondary to worsening hypoxia and altered mental status with associated mucus plugging.  Status post bronchoscopy x3 since admission to the  ICU for hypoxia associated with mucus plugging.  He has been off of hemodialysis for several days now.    24 Hour Interval History:  Suprapubic catheter leaking urine around ostomy site, still with output and catheter.  Awaiting tracheostomy and PEG tube placement.  He remains intubated and sedated on propofol and Precedex.    Past Medical History:   Diagnosis Date    Arthritis     Chronic ulcer of ankle 05/26/2022    Frequent UTI 07/02/2019    Generalized anxiety disorder 05/26/2022    Neurogenic bladder 05/26/2022    Osteomyelitis 05/26/2022    Presence of suprapubic catheter 05/26/2022    Pure hypercholesterolemia 05/26/2022    Retention of urine, unspecified 08/09/2019    Spinal cord injury at T1-T6 level 04/20/2018       Past Surgical History:   Procedure Laterality Date    INCISION AND DRAINAGE, LOWER EXTREMITY Right 6/29/2023    Procedure: INCISION AND DRAINAGE, LOWER EXTREMITY;  Surgeon: Prabhu Shen DO;  Location: [facility];  Service: Orthopedics;  Laterality: Right;  supine bone foam wash stuff cultures    INSERTION OF INTRAMEDULLARY MRAISA Right 8/10/2022    Procedure: INSERTION, INTRAMEDULLARY MARISA RIGHT TIBIA;  Surgeon: Jorge Orellana MD;  Location: [facility];  Service: Orthopedics;  Laterality: Right;       Social History     Socioeconomic History    Marital status:    Tobacco Use    Smoking status: Every Day     Current packs/day: 0.00     Types: Cigars, Cigarettes    Smokeless tobacco: Never   Substance and Sexual Activity    Alcohol use: Yes     Alcohol/week: 2.0 standard drinks of alcohol     Types: 2 Cans of beer per week    Drug use: Not Currently    Sexual activity: Never       Current Outpatient Medications   Medication Instructions    amitriptyline (ELAVIL) 50 mg, Oral, Nightly    amLODIPine (NORVASC) 10 MG tablet 1 tablet    atorvastatin (LIPITOR) 20 mg, Oral, Nightly    busPIRone (BUSPAR) 5 MG Tab 1 tablet    carvediloL (COREG) 12.5 mg, Oral    cyclobenzaprine (FLEXERIL) 10 mg, Oral, 2  times daily PRN    doxycycline (VIBRAMYCIN) 100 mg, Oral, Daily    fenofibrate 160 mg, Oral    FLUoxetine 20 mg, Oral    gabapentin (NEURONTIN) 300 MG capsule 1 capsule    lisinopriL (PRINIVIL,ZESTRIL) 10 mg, Oral, Nightly    lisinopriL 10 mg, Oral, Daily    LORazepam (ATIVAN) 1 mg, Oral, 2 times daily    melatonin (MELATIN) 3 mg tablet TAKE TWO TABLETS BY MOUTH EVERY NIGHT AT BEDTIME AS NEEDED FOR INSOMNIA.    meloxicam (MOBIC) 15 mg, Oral, Daily    metFORMIN (GLUCOPHAGE) 500 MG tablet SMARTSI Tablet(s) By Mouth Morning-Evening    oxybutynin (DITROPAN) 5 mg, Oral, 2 times daily    oxyCODONE-acetaminophen (PERCOCET)  mg per tablet 1 tablet, Oral, Every 6 hours PRN    pantoprazole (PROTONIX) 40 MG tablet 1 tablet    temazepam (RESTORIL) 30 mg, Oral, Nightly    traZODone (DESYREL) 50 mg, Oral, Nightly    XARELTO 20 mg Tab SMARTSI Tablet(s) By Mouth Every Evening       Current Inpatient Medications   albuterol-ipratropium  3 mL Nebulization BID    amLODIPine  10 mg Per NG tube Daily    atorvastatin  20 mg Per NG tube Nightly    carvediloL  25 mg Per NG tube BID    cefTRIAXone (ROCEPHIN) IVPB  2 g Intravenous Q24H    cloNIDine 0.2 mg/24 hr td ptwk  1 patch Transdermal Q7 Days    doxycycline  100 mg Per NG tube Q12H    enoxaparin  30 mg Subcutaneous Q12H    famotidine (PF)  20 mg Intravenous Daily    fenofibrate  48 mg Per NG tube Daily    guaiFENesin 100 mg/5 ml  400 mg Per NG tube Q4H    magnesium sulfate IVPB  2 g Intravenous Once    miconazole NITRATE 2 %   Topical (Top) BID    oxybutynin  5 mg Per NG tube BID    senna-docusate 8.6-50 mg  2 tablet Per NG tube BID    sodium chloride 3%  4 mL Nebulization BID    sodium citrate-citric acid 500-334 mg/5 ml  30 mL Oral Once     Current Intravenous Infusions   clevidipine 3 mg/hr (23 6159)    dexmedeTOMIDine (Precedex) infusion (titrating) 1 mcg/kg/hr (23 0014)    NORepinephrine bitartrate-D5W Stopped (23 1039)    propofoL 50 mcg/kg/min  (07/30/23 0245)       Objective:       Intake/Output Summary (Last 24 hours) at 7/30/2023 0430  Last data filed at 7/30/2023 0200  Gross per 24 hour   Intake 5565.71 ml   Output 4800 ml   Net 765.71 ml       Vital Signs (Most Recent):  Temp: 98.1 °F (36.7 °C) (07/30/23 0000)  Pulse: 64 (07/30/23 0300)  Resp: 20 (07/30/23 0300)  BP: 126/78 (07/30/23 0300)  SpO2: 100 % (07/30/23 0300)  Body mass index is 27.51 kg/m².  Weight: 86.2 kg (190 lb) Vital Signs (24h Range):  Temp:  [98.1 °F (36.7 °C)-98.7 °F (37.1 °C)] 98.1 °F (36.7 °C)  Pulse:  [] 64  Resp:  [19-26] 20  SpO2:  [88 %-100 %] 100 %  BP: (115-177)/(61-97) 126/78     Physical Exam  General:   no acute respiratory distress  Head: Normocephalic, atraumatic  Eyes: PERRL, EOMI, anicteric sclera  Neck: supple  CVS:  Irregularly irregular 2+ peripheral pulses  Resp: Coarse breath sounds bilaterally   GI:  Abdomen soft, non-distended, normoactive bowel sounds. Suprapubic catheter in place.  MSK: Wound VAC right knee  Skin: No rashes, ulcers, erythema  Neuro: Cough, gag, cornea and pupillary reflex intact      Lines/Drains/Airways       Drain  Duration                  Suprapubic Catheter 07/06/23 1544 100% silicone 22 Fr. 23 days         NG/OG Tube 07/21/23 1000 Edwards sump 16 Fr. Left nostril 8 days              Airway  Duration                  Airway - Non-Surgical 07/21/23 1655 8 days              Peripheral Intravenous Line  Duration                  Midline Catheter Insertion/Assessment  - Single Lumen 07/22/23 1603 Left basilic vein (medial side of arm) other (see comments) 7 days         Peripheral IV - Single Lumen 07/27/23 2145 20 G Posterior;Left Hand 2 days                  Significant Labs:    Lab Results   Component Value Date    WBC 6.04 07/30/2023    HGB 9.2 (L) 07/30/2023    HCT 29.2 (L) 07/30/2023    MCV 78.1 (L) 07/30/2023     (H) 07/30/2023     BMP  Lab Results   Component Value Date     07/30/2023    K 4.0 07/30/2023    CHLORIDE  "104 07/30/2023    CO2 27 07/30/2023    BUN 18.9 07/30/2023    CREATININE 0.76 07/30/2023    CALCIUM 9.3 07/30/2023    AGAP 10.0 07/30/2023    ESTGFRAFRICA 101 05/11/2022    EGFRNONAA >60 04/27/2022     ABG  No results for input(s): "PH", "PO2", "PCO2", "HCO3", "BE" in the last 168 hours.    Mechanical Ventilation Support:  Vent Mode: A/C (07/30/23 0240)  Ventilator Initiated: Yes (07/21/23 1655)  Set Rate: 20 BPM (07/30/23 0240)  Vt Set: 450 mL (07/30/23 0240)  Pressure Support: 12 cmH20 (07/29/23 2009)  PEEP/CPAP: 5 cmH20 (07/30/23 0240)  Oxygen Concentration (%): 30 (07/30/23 0240)  Peak Airway Pressure: 17 cmH20 (07/30/23 0240)  Total Ve: 8.3 L/m (07/30/23 0240)  F/VT Ratio<105 (RSBI): (!) 53.99 (07/30/23 0240)    Significant Imaging:  I have reviewed the pertinent imaging within the past 24 hours.    Assessment/Plan:     Assessment  Septic arthritis of the right knee with Strep agalactiae s/p I&D on 06/29/2023 with wound VAC in place currently on doxycycline for suppressive therapy  Acute hypoxemic respiratory failure with associated pneumonia in addition to pulmonary edema on mechanical ventilation s/p intubation x3  ANTON with CKD 4 s/p multiple sessions HD currently not receiving hemodialysis   Hypoactive delirium   Paroxysmal a-fib with permanent pacemaker in place   Hx of paraplegia secondary to spinal cord injury at level T1 with associated neurogenic bladder and chronic right ankle osteomyelitis  DM II  HTN      Plan  - Titrate mechanical ventilation per ARDS net protocol, maintain SpO2 94-96%  - Tentative plans for tracheostomy and PEG tube placement likely Monday  - Continue wound VAC per orthopedic surgery recommendations   - ID recommendations doxycycline for suppressive therapy through 08/09    DVT Prophylaxis: Lovenox  GI Prophylaxis: Pepcid     32 minutes of critical care was time spent personally by me on the following activities: development of treatment plan with patient or surrogate and bedside " caregivers, discussions with consultants, evaluation of patient's response to treatment, examination of patient, ordering and performing treatments and interventions, ordering and review of laboratory studies, ordering and review of radiographic studies, pulse oximetry, re-evaluation of patient's condition.  This critical care time did not overlap with that of any other provider or involve time for any procedures.     Jorge Alston MD PGY-II  Pulmonary Critical Care Medicine  [facility] - [facility] ICU

## 2023-07-30 NOTE — NURSING
Nurses Note -- 4 Eyes      7/30/2023   1:16 AM      Skin assessed during: Daily Assessment      [] No Altered Skin Integrity Present    [x]Prevention Measures Documented      [x] Yes- Altered Skin Integrity Present or Discovered   [] LDA Added if Not in Epic (Describe Wound)   [] New Altered Skin Integrity was Present on Admit and Documented in LDA   [] Wound Image Taken    Wound Care Consulted? Yes    Attending Nurse:  Marilou Veloz RN     Second RN/Staff Member:  Lauren Townsend RN

## 2023-07-30 NOTE — PROGRESS NOTES
NEPHROLOGY PROGRESS NOTE       Patient Name: Bianca ARMIJO.O.B. 1965    Date: 2023  Time: 11:00 AM      Reason for consult: ANTON       HPI: 57 yo M with paraplegia and bladder dysfunction with suprapubic catheter admitted with right knee septic joint. The pt had complicated hospital admission, involving acute respiratory failure and multiple intubations. Pt is currently intubated and in ICU setting.     Interval History: COVERAGE FOR DR. WIGGINS. Pt seen and examined at bedside this AM in ICU setting. Intubated. Non-oliguric.     Review of Systems:  Unable to obtain.        Current Facility-Administered Medications:     0.9%  NaCl infusion (for blood administration), , Intravenous, Q24H PRN, Omar Reza DO    acetaminophen tablet 650 mg, 650 mg, Per NG tube, Q8H PRN, Khris Treadwell Jr., MD, FCCP, 650 mg at 23 1336    albuterol-ipratropium 2.5 mg-0.5 mg/3 mL nebulizer solution 3 mL, 3 mL, Nebulization, BID, Luis Mcclure MD, 3 mL at 23 0824    amLODIPine tablet 10 mg, 10 mg, Per NG tube, Daily, Khris Treadwell Jr., MD, FCCP, 10 mg at 23 07    atorvastatin tablet 20 mg, 20 mg, Per NG tube, Nightly, Khris Treadwell Jr., MD, FCCP, 20 mg at 23    carvediloL tablet 25 mg, 25 mg, Per NG tube, BID, Woody Azul DO, 25 mg at 23 0744    cefTRIAXone (ROCEPHIN) 2 g in dextrose 5 % in water (D5W) 5 % 100 mL IVPB (MB+), 2 g, Intravenous, Q24H, Khris Treadwell Jr., MD, FCCP, Stopped at 23 0043    clevidipine (CLEVIPREX) 25 mg/50 mL infusion, 1-21 mg/hr, Intravenous, Continuous, Woody Azul DO, Last Rate: 6 mL/hr at 23 0738, 3 mg/hr at 23 0738    cloNIDine 0.2 mg/24 hr td ptwk 1 patch, 1 patch, Transdermal, Q7 Days, Violeta Morton, ANP, 1 patch at 23 1029    dexmedetomidine (PRECEDEX) 400mcg/100mL 0.9% NaCL infusion, 0-1.4 mcg/kg/hr, Intravenous, Continuous, Favian Quiroz MD, Last Rate: 21.6 mL/hr at 23 0738, 1 mcg/kg/hr at 23 0738     doxycycline tablet 100 mg, 100 mg, Per NG tube, Q12H, Khris Treadwell Jr., MD, FCCP, 100 mg at 23 0744    enoxaparin injection 30 mg, 30 mg, Subcutaneous, Q12H, Khris Treadwell Jr., MD, FCCP, 30 mg at 23 0743    etomidate injection, , Intravenous, Code/trauma/sedation Med, Luis Mcclure MD, 30 mg at 23 1652    famotidine (PF) injection 20 mg, 20 mg, Intravenous, Daily, Lexis Duong DO, 20 mg at 23 0743    fenofibrate tablet 48 mg, 48 mg, Per NG tube, Daily, Khris Treadwell Jr., MD, FCCP, 48 mg at 23 0745    [] fentaNYL injection 50 mcg, 50 mcg, Intravenous, Q1H PRN, 50 mcg at 07/15/23 2117 **FOLLOWED BY** fentaNYL injection 50 mcg, 50 mcg, Intravenous, Q1H PRN, Jv Morgan MD, 50 mcg at 23 0405    fentaNYL injection, , Intravenous, Code/trauma/sedation Med, Luis Mcclure MD, 100 mcg at 23 1703    guaiFENesin 100 mg/5 ml syrup 400 mg, 400 mg, Per NG tube, Q4H, Khris Treadwell Jr., MD, FCCP, 400 mg at 23 1027    hydrALAZINE injection 10 mg, 10 mg, Intravenous, Q8H PRN, Guido Rivas MD, 10 mg at 23 1635    labetaloL injection 10 mg, 10 mg, Intravenous, Q4H PRN, Guido Rivas MD, 10 mg at 23 1207    miconazole NITRATE 2 % top powder, , Topical (Top), BID, Yissel Proctor MD, Given at 23 0745    NORepinephrine 8 mg in dextrose 5% 250 mL infusion, 0-3 mcg/kg/min, Intravenous, Continuous, Khris Treadwell Jr., MD, FCCP, Stopped at 23 1039    oxybutynin tablet 5 mg, 5 mg, Per NG tube, BID, Khris Treadwell Jr., MD, FCCP, 5 mg at 23 0744    oxyCODONE-acetaminophen  mg per tablet 1 tablet, 1 tablet, Per NG tube, Q6H PRN, Khris Treadwell Jr., MD, FCCP, 1 tablet at 23 1315    propofol (DIPRIVAN) 10 mg/mL infusion, 0-50 mcg/kg/min, Intravenous, Continuous, Luis Mcclure MD, Last Rate: 25.9 mL/hr at 23 1027, 50 mcg/kg/min at 23 1027    rocuronium injection, , Intravenous, Code/trauma/sedation  Med, Luis Mcclure MD, 100 mg at 07/21/23 1653    senna-docusate 8.6-50 mg per tablet 2 tablet, 2 tablet, Per NG tube, BID, Khris Treadwell Jr., MD, FCCP, 2 tablet at 07/30/23 0744    sodium chloride 0.9% bolus 250 mL 250 mL, 250 mL, Intravenous, PRN, Nemesio Hall MD    sodium chloride 0.9% flush 10 mL, 10 mL, Intravenous, PRN, Willy Martinez MD    sodium chloride 3% nebulizer solution 4 mL, 4 mL, Nebulization, BID, Luis Mcclure MD, 4 mL at 07/30/23 0824    sodium citrate-citric acid 500-334 mg/5 ml solution 30 mL, 30 mL, Oral, Once, Nabeel Tejada MD    Vital Signs (24 h):  Temp:  [97.9 °F (36.6 °C)-98.7 °F (37.1 °C)] 98.4 °F (36.9 °C)  Pulse:  [] 62  Resp:  [19-26] 20  SpO2:  [88 %-100 %] 100 %  BP: (115-183)/() 141/79   I/O last 3 completed shifts:  In: 7975.7 [I.V.:1832.1; NG/GT:5955; IV Piggyback:188.6]  Out: 7325 [Urine:7325]  I/O this shift:  In: -   Out: 900 [Urine:900]        Physical Exam:  General: intubated  HEENT: + ETT  CVS: RRR   RS: mechanical breath sounds    Abdominal: Soft  Extremities: No edema b/l LE, right knee wound vac  Skin: No rash, no lesions.  Dialysis Access: None    Results:    Lab Results   Component Value Date     07/30/2023    K 4.0 07/30/2023     05/11/2022    CO2 27 07/30/2023    BUN 18.9 07/30/2023    CREATININE 0.76 07/30/2023    CALCIUM 9.3 07/30/2023    PHOS 4.7 07/30/2023    WBC 6.04 07/30/2023    HGB 9.2 (L) 07/30/2023    HCT 29.2 (L) 07/30/2023     (H) 07/30/2023       Assessment and Plan:      1   ANTON / ATN    improved  2   CKD 3  3   SHPT  4   Pre Renal    improved  5   hypernatremia   improved  6   paraplegia  7   A Fib   8   DM2  9   respiratory failure on vent  10  Right knee septic joint  11  moderate protein calorie malnutrition      Plan  - No changes. Continue ICU management.  - Monitor labs.    Thank you for your consult. Please feel free to reach me with any questions.  Dr. Hall to resume care tomorrow.    Miguel  DO [place]  [facility]  Cell: [phone]

## 2023-07-31 LAB
ALBUMIN SERPL-MCNC: 2.5 G/DL (ref 3.5–5)
ALBUMIN/GLOB SERPL: 0.5 RATIO (ref 1.1–2)
ALP SERPL-CCNC: 102 UNIT/L (ref 40–150)
ALT SERPL-CCNC: 16 UNIT/L (ref 0–55)
AST SERPL-CCNC: 19 UNIT/L (ref 5–34)
BASOPHILS # BLD AUTO: 0.03 X10(3)/MCL
BASOPHILS NFR BLD AUTO: 0.4 %
BILIRUBIN DIRECT+TOT PNL SERPL-MCNC: 0.3 MG/DL
BUN SERPL-MCNC: 18.6 MG/DL (ref 8.4–25.7)
CALCIUM SERPL-MCNC: 9.1 MG/DL (ref 8.4–10.2)
CHLORIDE SERPL-SCNC: 103 MMOL/L (ref 98–107)
CO2 SERPL-SCNC: 26 MMOL/L (ref 22–29)
CREAT SERPL-MCNC: 0.79 MG/DL (ref 0.73–1.18)
EOSINOPHIL # BLD AUTO: 0.11 X10(3)/MCL (ref 0–0.9)
EOSINOPHIL NFR BLD AUTO: 1.6 %
ERYTHROCYTE [DISTWIDTH] IN BLOOD BY AUTOMATED COUNT: 22 % (ref 11.5–17)
GFR SERPLBLD CREATININE-BSD FMLA CKD-EPI: >60 MLS/MIN/1.73/M2
GLOBULIN SER-MCNC: 4.9 GM/DL (ref 2.4–3.5)
GLUCOSE SERPL-MCNC: 143 MG/DL (ref 74–100)
HCT VFR BLD AUTO: 32.1 % (ref 42–52)
HGB BLD-MCNC: 9.7 G/DL (ref 14–18)
IMM GRANULOCYTES # BLD AUTO: 0.03 X10(3)/MCL (ref 0–0.04)
IMM GRANULOCYTES NFR BLD AUTO: 0.4 %
LYMPHOCYTES # BLD AUTO: 1.58 X10(3)/MCL (ref 0.6–4.6)
LYMPHOCYTES NFR BLD AUTO: 23.1 %
MAGNESIUM SERPL-MCNC: 2.1 MG/DL (ref 1.6–2.6)
MCH RBC QN AUTO: 24.3 PG (ref 27–31)
MCHC RBC AUTO-ENTMCNC: 30.2 G/DL (ref 33–36)
MCV RBC AUTO: 80.5 FL (ref 80–94)
MONOCYTES # BLD AUTO: 0.57 X10(3)/MCL (ref 0.1–1.3)
MONOCYTES NFR BLD AUTO: 8.3 %
NEUTROPHILS # BLD AUTO: 4.51 X10(3)/MCL (ref 2.1–9.2)
NEUTROPHILS NFR BLD AUTO: 66.2 %
NRBC BLD AUTO-RTO: 0 %
PHOSPHATE SERPL-MCNC: 4.2 MG/DL (ref 2.3–4.7)
PLATELET # BLD AUTO: 664 X10(3)/MCL (ref 130–400)
PMV BLD AUTO: 9.8 FL (ref 7.4–10.4)
POTASSIUM SERPL-SCNC: 4.2 MMOL/L (ref 3.5–5.1)
PROT SERPL-MCNC: 7.4 GM/DL (ref 6.4–8.3)
RBC # BLD AUTO: 3.99 X10(6)/MCL (ref 4.7–6.1)
SODIUM SERPL-SCNC: 140 MMOL/L (ref 136–145)
WBC # SPEC AUTO: 6.83 X10(3)/MCL (ref 4.5–11.5)

## 2023-07-31 PROCEDURE — 25000003 PHARM REV CODE 250: Performed by: INTERNAL MEDICINE

## 2023-07-31 PROCEDURE — 94003 VENT MGMT INPAT SUBQ DAY: CPT

## 2023-07-31 PROCEDURE — 25000003 PHARM REV CODE 250

## 2023-07-31 PROCEDURE — 94761 N-INVAS EAR/PLS OXIMETRY MLT: CPT

## 2023-07-31 PROCEDURE — 25000003 PHARM REV CODE 250: Performed by: STUDENT IN AN ORGANIZED HEALTH CARE EDUCATION/TRAINING PROGRAM

## 2023-07-31 PROCEDURE — 99900035 HC TECH TIME PER 15 MIN (STAT)

## 2023-07-31 PROCEDURE — C9248 INJ, CLEVIDIPINE BUTYRATE: HCPCS | Mod: JZ,JG

## 2023-07-31 PROCEDURE — 83735 ASSAY OF MAGNESIUM: CPT | Performed by: STUDENT IN AN ORGANIZED HEALTH CARE EDUCATION/TRAINING PROGRAM

## 2023-07-31 PROCEDURE — 20000000 HC ICU ROOM

## 2023-07-31 PROCEDURE — 63600175 PHARM REV CODE 636 W HCPCS: Performed by: INTERNAL MEDICINE

## 2023-07-31 PROCEDURE — 25000242 PHARM REV CODE 250 ALT 637 W/ HCPCS: Performed by: INTERNAL MEDICINE

## 2023-07-31 PROCEDURE — 99900031 HC PATIENT EDUCATION (STAT)

## 2023-07-31 PROCEDURE — 27200966 HC CLOSED SUCTION SYSTEM

## 2023-07-31 PROCEDURE — 94640 AIRWAY INHALATION TREATMENT: CPT

## 2023-07-31 PROCEDURE — 85025 COMPLETE CBC W/AUTO DIFF WBC: CPT | Performed by: STUDENT IN AN ORGANIZED HEALTH CARE EDUCATION/TRAINING PROGRAM

## 2023-07-31 PROCEDURE — 84100 ASSAY OF PHOSPHORUS: CPT | Performed by: STUDENT IN AN ORGANIZED HEALTH CARE EDUCATION/TRAINING PROGRAM

## 2023-07-31 PROCEDURE — 99900026 HC AIRWAY MAINTENANCE (STAT)

## 2023-07-31 PROCEDURE — 80053 COMPREHEN METABOLIC PANEL: CPT | Performed by: STUDENT IN AN ORGANIZED HEALTH CARE EDUCATION/TRAINING PROGRAM

## 2023-07-31 PROCEDURE — 63600175 PHARM REV CODE 636 W HCPCS: Mod: JZ,JG

## 2023-07-31 PROCEDURE — 27100171 HC OXYGEN HIGH FLOW UP TO 24 HOURS

## 2023-07-31 RX ADMIN — GUAIFENESIN 400 MG: 200 SOLUTION ORAL at 09:07

## 2023-07-31 RX ADMIN — ATORVASTATIN CALCIUM 20 MG: 10 TABLET, FILM COATED ORAL at 08:07

## 2023-07-31 RX ADMIN — DOXYCYCLINE HYCLATE 100 MG: 100 TABLET, COATED ORAL at 08:07

## 2023-07-31 RX ADMIN — SODIUM CHLORIDE 30 MG/ML INHALATION SOLUTION 4 ML: 30 SOLUTION INHALANT at 07:07

## 2023-07-31 RX ADMIN — SENNOSIDES AND DOCUSATE SODIUM 2 TABLET: 50; 8.6 TABLET ORAL at 08:07

## 2023-07-31 RX ADMIN — FAMOTIDINE 20 MG: 10 INJECTION, SOLUTION INTRAVENOUS at 08:07

## 2023-07-31 RX ADMIN — ENOXAPARIN SODIUM 30 MG: 30 INJECTION SUBCUTANEOUS at 12:07

## 2023-07-31 RX ADMIN — PROPOFOL 50 MCG/KG/MIN: 10 INJECTION, EMULSION INTRAVENOUS at 01:07

## 2023-07-31 RX ADMIN — FENOFIBRATE 48 MG: 48 TABLET, FILM COATED ORAL at 08:07

## 2023-07-31 RX ADMIN — PROPOFOL 50 MCG/KG/MIN: 10 INJECTION, EMULSION INTRAVENOUS at 04:07

## 2023-07-31 RX ADMIN — PROPOFOL 50 MCG/KG/MIN: 10 INJECTION, EMULSION INTRAVENOUS at 08:07

## 2023-07-31 RX ADMIN — PROPOFOL 50 MCG/KG/MIN: 10 INJECTION, EMULSION INTRAVENOUS at 05:07

## 2023-07-31 RX ADMIN — CARVEDILOL 25 MG: 12.5 TABLET, FILM COATED ORAL at 08:07

## 2023-07-31 RX ADMIN — IPRATROPIUM BROMIDE AND ALBUTEROL SULFATE 3 ML: 2.5; .5 SOLUTION RESPIRATORY (INHALATION) at 08:07

## 2023-07-31 RX ADMIN — ENOXAPARIN SODIUM 30 MG: 30 INJECTION SUBCUTANEOUS at 08:07

## 2023-07-31 RX ADMIN — GUAIFENESIN 400 MG: 200 SOLUTION ORAL at 02:07

## 2023-07-31 RX ADMIN — CLEVIPIDINE 3 MG/HR: 0.5 EMULSION INTRAVENOUS at 01:07

## 2023-07-31 RX ADMIN — DEXMEDETOMIDINE HYDROCHLORIDE 1 MCG/KG/HR: 400 INJECTION INTRAVENOUS at 11:07

## 2023-07-31 RX ADMIN — OXYBUTYNIN CHLORIDE 5 MG: 5 TABLET ORAL at 08:07

## 2023-07-31 RX ADMIN — AMLODIPINE BESYLATE 10 MG: 5 TABLET ORAL at 08:07

## 2023-07-31 RX ADMIN — OXYCODONE AND ACETAMINOPHEN 1 TABLET: 10; 325 TABLET ORAL at 08:07

## 2023-07-31 RX ADMIN — DEXMEDETOMIDINE HYDROCHLORIDE 1 MCG/KG/HR: 400 INJECTION INTRAVENOUS at 01:07

## 2023-07-31 RX ADMIN — GUAIFENESIN 400 MG: 200 SOLUTION ORAL at 05:07

## 2023-07-31 RX ADMIN — PROPOFOL 50 MCG/KG/MIN: 10 INJECTION, EMULSION INTRAVENOUS at 12:07

## 2023-07-31 RX ADMIN — DEXMEDETOMIDINE HYDROCHLORIDE 1 MCG/KG/HR: 400 INJECTION INTRAVENOUS at 05:07

## 2023-07-31 RX ADMIN — GUAIFENESIN 400 MG: 200 SOLUTION ORAL at 10:07

## 2023-07-31 RX ADMIN — PROPOFOL 50 MCG/KG/MIN: 10 INJECTION, EMULSION INTRAVENOUS at 11:07

## 2023-07-31 RX ADMIN — DEXMEDETOMIDINE HYDROCHLORIDE 0.6 MCG/KG/HR: 400 INJECTION INTRAVENOUS at 12:07

## 2023-07-31 RX ADMIN — GUAIFENESIN 400 MG: 200 SOLUTION ORAL at 01:07

## 2023-07-31 RX ADMIN — OXYBUTYNIN CHLORIDE 5 MG: 5 TABLET ORAL at 09:07

## 2023-07-31 RX ADMIN — MICONAZOLE NITRATE 2 % TOPICAL POWDER: at 08:07

## 2023-07-31 RX ADMIN — CEFTRIAXONE SODIUM 2 G: 2 INJECTION, POWDER, FOR SOLUTION INTRAMUSCULAR; INTRAVENOUS at 11:07

## 2023-07-31 RX ADMIN — IPRATROPIUM BROMIDE AND ALBUTEROL SULFATE 3 ML: 2.5; .5 SOLUTION RESPIRATORY (INHALATION) at 07:07

## 2023-07-31 RX ADMIN — CLEVIPIDINE 2 MG/HR: 0.5 EMULSION INTRAVENOUS at 08:07

## 2023-07-31 NOTE — CONSULTS
Bianca Khan 1965  [id number]  7/31/2023    CONSULTING PHYSICIAN: Dr. Alfredo    REASON FOR CONSULTATION: suprapubic catheter leaking    HPI: The patient is a 58-year-old male with past medical history of 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 and neurogenic bladder status post SP tube, diabetes mellitus, hypertension, hepatitis-C. He is a patient of Dr. Simental. He has his SP tube exchanged every 2 weeks per NSI. He presented to the emergency room on 06/28/2023 with pain and swelling after striking his knee.  Purulent fluid was aspirated in the ER and he was started on broad-spectrum antibiotics. Cultures with Group B Strep.  He went into acute respiratory distress on 07/04/2023 and was subsequently transferred to the ICU and has been intubated since.  He was initiated on hemodialysis around that same time.  He has been extubated a few times and reintubated.  Plans for trach and PEG in the near future.  He is leaking around SP tube site.  Urology was consulted for evaluation. SPT last exchanged on 7/6/2023.    Patient intubated and sedated on mechanical ventilation. No family at hospital, wife out of town per nursing.     Past Medical History:   Diagnosis Date    Arthritis     Chronic ulcer of ankle 05/26/2022    Frequent UTI 07/02/2019    Generalized anxiety disorder 05/26/2022    Neurogenic bladder 05/26/2022    Osteomyelitis 05/26/2022    Presence of suprapubic catheter 05/26/2022    Pure hypercholesterolemia 05/26/2022    Retention of urine, unspecified 08/09/2019    Spinal cord injury at T1-T6 level 04/20/2018     Past Surgical History:   Procedure Laterality Date    INCISION AND DRAINAGE, LOWER EXTREMITY Right 6/29/2023    Procedure: INCISION AND DRAINAGE, LOWER EXTREMITY;  Surgeon: Prabhu Shen DO;  Location: [facility];  Service: Orthopedics;  Laterality: Right;  supine bone foam wash stuff cultures    INSERTION OF INTRAMEDULLARY MARISA Right 8/10/2022    Procedure: INSERTION, INTRAMEDULLARY MARISA  RIGHT TIBIA;  Surgeon: Jorge Orellana MD;  Location: [facility];  Service: Orthopedics;  Laterality: Right;     Family History   Problem Relation Age of Onset    No Known Problems Mother     No Known Problems Father      Social History     Tobacco Use    Smoking status: Every Day     Current packs/day: 0.00     Types: Cigars, Cigarettes    Smokeless tobacco: Never   Substance Use Topics    Alcohol use: Yes     Alcohol/week: 2.0 standard drinks of alcohol     Types: 2 Cans of beer per week    Drug use: Not Currently     Current Facility-Administered Medications   Medication Dose Route Frequency Provider Last Rate Last Admin    0.9%  NaCl infusion (for blood administration)   Intravenous Q24H PRN Omar Reza DO        acetaminophen tablet 650 mg  650 mg Per NG tube Q8H PRN Khris Treadwell Jr., MD, FCCP   650 mg at 07/13/23 1336    albuterol-ipratropium 2.5 mg-0.5 mg/3 mL nebulizer solution 3 mL  3 mL Nebulization BID Luis Mcclure MD   3 mL at 07/31/23 0736    amLODIPine tablet 10 mg  10 mg Per NG tube Daily Khris Treadwell Jr., MD, FCCP   10 mg at 07/31/23 0829    atorvastatin tablet 20 mg  20 mg Per NG tube Nightly Khris Treadwell Jr., MD, FCCP   20 mg at 07/30/23 2010    carvediloL tablet 25 mg  25 mg Per NG tube BID Woody Azul DO   25 mg at 07/31/23 0829    cefTRIAXone (ROCEPHIN) 2 g in dextrose 5 % in water (D5W) 5 % 100 mL IVPB (MB+)  2 g Intravenous Q24H Khris Treadwell Jr., MD, FCCP   Stopped at 07/31/23 0029    clevidipine (CLEVIPREX) 25 mg/50 mL infusion  1-21 mg/hr Intravenous Continuous Woody Azul DO 4 mL/hr at 07/31/23 0828 2 mg/hr at 07/31/23 0828    cloNIDine 0.2 mg/24 hr td ptwk 1 patch  1 patch Transdermal Q7 Days PAULA Anderson   1 patch at 07/27/23 1029    dexmedetomidine (PRECEDEX) 400mcg/100mL 0.9% NaCL infusion  0-1.4 mcg/kg/hr Intravenous Continuous Favian Quiroz MD 21.6 mL/hr at 07/31/23 0547 1 mcg/kg/hr at 07/31/23 0547    doxycycline tablet 100 mg  100 mg Per NG tube Q12H Khris AVENDAÑO  Eben Almaguer MD, FCCP   100 mg at 07/31/23 0829    enoxaparin injection 30 mg  30 mg Subcutaneous Q12H Khris Treadwell Jr., MD, FCCP   30 mg at 07/30/23 0743    etomidate injection   Intravenous Code/trauma/sedation Med Luis Mcclure MD   30 mg at 07/21/23 1652    famotidine (PF) injection 20 mg  20 mg Intravenous Daily Lexis Duong DO   20 mg at 07/31/23 0829    fenofibrate tablet 48 mg  48 mg Per NG tube Daily Khris Treadwell Jr., MD, FCCP   48 mg at 07/31/23 0835    fentaNYL injection 50 mcg  50 mcg Intravenous Q1H PRN Jv Morgan MD   50 mcg at 07/27/23 0405    fentaNYL injection   Intravenous Code/trauma/sedation Med Luis Mcclure MD   100 mcg at 07/21/23 1703    guaiFENesin 100 mg/5 ml syrup 400 mg  400 mg Per NG tube Q4H Khris Treadwell Jr., MD, FCCP   400 mg at 07/31/23 0522    hydrALAZINE injection 10 mg  10 mg Intravenous Q8H PRN Guido Rivas MD   10 mg at 07/27/23 1635    labetaloL injection 10 mg  10 mg Intravenous Q4H PRN Guiod Rivas MD   10 mg at 07/29/23 1207    miconazole NITRATE 2 % top powder   Topical (Top) BID Yissel Proctor MD   Given at 07/31/23 0829    NORepinephrine 8 mg in dextrose 5% 250 mL infusion  0-3 mcg/kg/min Intravenous Continuous Khris Treadwell Jr., MD, FCCP   Stopped at 07/12/23 1039    oxybutynin tablet 5 mg  5 mg Per NG tube BID Khris Treadwell Jr., MD, FCCP   5 mg at 07/31/23 0829    oxyCODONE-acetaminophen  mg per tablet 1 tablet  1 tablet Per NG tube Q6H PRN Khris Treadwell Jr., MD, FCCP   1 tablet at 07/26/23 1315    propofol (DIPRIVAN) 10 mg/mL infusion  0-50 mcg/kg/min Intravenous Continuous Luis Mcclure MD 25.9 mL/hr at 07/31/23 0547 50 mcg/kg/min at 07/31/23 0547    rocuronium injection   Intravenous Code/trauma/sedation Med Luis Mcclure MD   100 mg at 07/21/23 1653    senna-docusate 8.6-50 mg per tablet 2 tablet  2 tablet Per NG tube BID Khris Treadwell Jr., MD, [facility]   2 tablet at 07/31/23 0830    sodium chloride 0.9% bolus  250 mL 250 mL  250 mL Intravenous PRN Nemesio Hall MD        sodium chloride 0.9% flush 10 mL  10 mL Intravenous PRN Willy Martinez MD        sodium chloride 3% nebulizer solution 4 mL  4 mL Nebulization BID Luis Mcclure MD   4 mL at 07/31/23 0736    sodium citrate-citric acid 500-334 mg/5 ml solution 30 mL  30 mL Oral Once Nabeel Tejada MD         Review of patient's allergies indicates:   Allergen Reactions    Baclofen Itching and Anxiety       ROS: 12 point review of systems negative other than the HPI    PHYSICAL EXAM:  Vitals:    07/31/23 0600 07/31/23 0615 07/31/23 0630 07/31/23 0736   BP: 128/74 129/78 135/82    BP Location: Right arm      Patient Position: Lying      Pulse: 61 60 61 90   Resp: 20 20 20 20   Temp:       TempSrc:       SpO2: 100% 100% 100% 98%   Weight:       Height:           Intake/Output Summary (Last 24 hours) at 7/31/2023 1020  Last data filed at 7/31/2023 0600  Gross per 24 hour   Intake 3150.25 ml   Output 3350 ml   Net -199.75 ml     Exam:  sedated  Card: RRR  Resp: intubated on mechanical ventilation  Abd: soft, ND  : cloudy yellow urine with sediment draining from SP tube. SPT site c/d/I without redness or drainage; SPT exchanged without issues     LABS:  Recent Results (from the past 24 hour(s))   Phosphorus    Collection Time: 07/31/23  1:57 AM   Result Value Ref Range    Phosphorus Level 4.2 2.3 - 4.7 mg/dL   Magnesium    Collection Time: 07/31/23  1:57 AM   Result Value Ref Range    Magnesium Level 2.10 1.60 - 2.60 mg/dL   Comprehensive Metabolic Panel    Collection Time: 07/31/23  1:57 AM   Result Value Ref Range    Sodium Level 140 136 - 145 mmol/L    Potassium Level 4.2 3.5 - 5.1 mmol/L    Chloride 103 98 - 107 mmol/L    Carbon Dioxide 26 22 - 29 mmol/L    Glucose Level 143 (H) 74 - 100 mg/dL    Blood Urea Nitrogen 18.6 8.4 - 25.7 mg/dL    Creatinine 0.79 0.73 - 1.18 mg/dL    Calcium Level Total 9.1 8.4 - 10.2 mg/dL    Protein Total 7.4 6.4 - 8.3 gm/dL     Albumin Level 2.5 (L) 3.5 - 5.0 g/dL    Globulin 4.9 (H) 2.4 - 3.5 gm/dL    Albumin/Globulin Ratio 0.5 (L) 1.1 - 2.0 ratio    Bilirubin Total 0.3 <=1.5 mg/dL    Alkaline Phosphatase 102 40 - 150 unit/L    Alanine Aminotransferase 16 0 - 55 unit/L    Aspartate Aminotransferase 19 5 - 34 unit/L    eGFR >60 mls/min/1.73/m2   CBC with Differential    Collection Time: 07/31/23  1:57 AM   Result Value Ref Range    WBC 6.83 4.50 - 11.50 x10(3)/mcL    RBC 3.99 (L) 4.70 - 6.10 x10(6)/mcL    Hgb 9.7 (L) 14.0 - 18.0 g/dL    Hct 32.1 (L) 42.0 - 52.0 %    MCV 80.5 80.0 - 94.0 fL    MCH 24.3 (L) 27.0 - 31.0 pg    MCHC 30.2 (L) 33.0 - 36.0 g/dL    RDW 22.0 (H) 11.5 - 17.0 %    Platelet 664 (H) 130 - 400 x10(3)/mcL    MPV 9.8 7.4 - 10.4 fL    Neut % 66.2 %    Lymph % 23.1 %    Mono % 8.3 %    Eos % 1.6 %    Basophil % 0.4 %    Lymph # 1.58 0.6 - 4.6 x10(3)/mcL    Neut # 4.51 2.1 - 9.2 x10(3)/mcL    Mono # 0.57 0.1 - 1.3 x10(3)/mcL    Eos # 0.11 0 - 0.9 x10(3)/mcL    Baso # 0.03 <=0.2 x10(3)/mcL    IG# 0.03 0 - 0.04 x10(3)/mcL    IG% 0.4 %    NRBC% 0.0 %         ASSESSMENT:  Neurogenic bladder s/t paraplegia with chronic SP tube  ANTON with initiation of HD on 7/4/2023  Respiratory failure  Septic arthritis of right knee s/p I&D    PLAN:  -SPT exchanged without difficulty.  -Will need to continue with routine SPT changes after d/c  -No additional recs from urology standpoint at this time. Please call as needed.       Kristin Quinn NP

## 2023-07-31 NOTE — PROGRESS NOTES
Inpatient Nutrition Assessment    Admit Date: 6/28/2023   Total duration of encounter: 33 days     Nutrition Recommendation/Prescription     Tube feeding recommendation:   Peptamen Intense VHP goal rate 60 ml/hr to provide  1200 kcal/d (62% est needs, 108% with meds)  111 g protein/d (85% est needs)  1008 ml free water/d   (calculations based on estimated 20 hr/d run time)     Continue with Malachi (provides 90 kcal, 2.5 g protein per serving) BID.    Communication of Recommendations: reviewed with nurse    Nutrition Assessment     Malnutrition Assessment/Nutrition-Focused Physical Exam    Malnutrition Context: chronic illness  Malnutrition Level:  (does not meet criteria)  Energy Intake (Malnutrition):  (unable to obtain)  Weight Loss (Malnutrition):  (unable to obtain)  Subcutaneous Fat (Malnutrition):  (does not meet criteria)           Muscle Mass (Malnutrition):  (does not meet criteria)                          Fluid Accumulation (Malnutrition):  (does not meet criteria)        A minimum of two characteristics is recommended for diagnosis of either severe or non-severe malnutrition.    Chart Review    Reason Seen: continuous nutrition monitoring and follow-up    Malnutrition Screening Tool Results   Have you recently lost weight without trying?: Unsure  Have you been eating poorly because of a decreased appetite?: Yes   MST Score: 3     Diagnosis:  Bilateral Pulmonary Infiltrates   Uremic encephalopathy 2/2 acute renal failure  Acute renal failure  Hyponatremia  Septic arthritis    Relevant Medical History: paraplegia 2/2 spinal cord injury (T1-T6), neurogenic bladder with suprapubic catheter, chronic right ankle osteomyelitis, DM II, HTN    Nutrition-Related Medications:   cleviprex, diprivan, senna-docusate    Calorie Containing IV Medications: Diprivan @ 26 ml/hr (provides 685 kcal/d) and Cleviprex @ 4 ml/hr (provides 190 kcal/d)    Nutrition-Related Labs:  7/5 Na 128, K 5.3, Glu 93, BUN 39.8, Crea 7.07, Phos  7.1, GFR 8  7/11 Na 133, BUN 75.8, Crea 4.3, Glu 121, Phos 6.5  7/14 .2, Crea 4.09, Glu 118, Phos 5.9  7/18 BUN 59.5, Crea 2.34, Glu 119, Phos 5.3  7/20 Na 148, BUN 71.2, Crea 2.4, Glu 146  7/24 BUN 86, Crea 2.17, Glu 152, Phos 5.2  7/26 Cl 111, Glu 147  7/31 Glu 143    Diet/PN Order: Diet NPO Except for: Sips with Medication  Oral Supplement Order: none  Tube Feeding Order:  Impact Peptide 1.5 (see below for calculation)  Appetite/Oral Intake: not applicable/not applicable  Factors Affecting Nutritional Intake: respiratory status  Food/Anabaptism/Cultural Preferences: unable to obtain  Food Allergies: none reported    Skin Integrity: intact  Wound(s):      Altered Skin Integrity 06/28/23 2230 medial Sacral spine #1-Tissue loss description: Partial thickness       Altered Skin Integrity 06/28/23 2230 Right posterior Buttocks #2-Tissue loss description: Not applicable       Altered Skin Integrity 06/28/23 2230 Left posterior Buttocks #4-Tissue loss description: Not applicable       Altered Skin Integrity 06/28/23 2230 Right lateral Ankle #6-Tissue loss description: Partial thickness     Comments    7/5/23: Discussed with RN. Will provide tube feeding recommendations for when appropriate to start tube feeding. Receiving kcal from meds. Noted K and Phos, will need renal formula at this time. Will add supplemental protein and Malachi for wound healing.     7/11/23: TF continues, tolerated @ goal rate.     7/14/23: TF continues, tolerated per RN.     7/18/23: TF continues, tolerated per RN. Possible plans for vent weaning today. Still receiving kcal from meds. Noted increase in diprivan. If remains at increased rate, will adjust TF rate to not overfeed.    7/20/23: Tf no longer running. NG pulled out by pt. Now on BIPAP and not able to replace tube. Discussed with RN, can wait a few days, but will eventually need to consider reinserting NG vs. PN if not able to insert NG.     7/24/23: Pt now reintubated. NG in place.  "TF running. Now overfeeding pt and Phos elevated. Will continue with Malachi but change to renal formula. Plans for trach this week per RN. Noted updated TF recs will not meet est protein needs. Will continue at this time and hopefully once trach placed can increase TF rate since diprivan hopefully weaned.    23: TF on hold for trach and PEG today. Noted now not placed per notes. Can continue with previous TF. Once placed, will update TF to continue to meet est needs.    23: TF continues, tolerated per RN. Noted plans for trach/PEG later this week. Receiving kcal from meds.     Anthropometrics    Height: 5' 9.69" (177 cm) Height Method: Stated  Last Weight: 86.2 kg (190 lb) (23 1114) Weight Method: Standard Scale (stated)  BMI (Calculated): 27.5  BMI Classification: overweight (BMI 25-29.9)        Ideal Body Weight (IBW), Male: 164.14 lb     % Ideal Body Weight, Male (lb): 115.75 %                          Usual Weight Provided By: unable to obtain usual weight    Wt Readings from Last 5 Encounters:   23 86.2 kg (190 lb)   22 86.2 kg (190 lb)   22 86.2 kg (190 lb 0.6 oz)   22 86.2 kg (189 lb 15.9 oz)     Weight Change(s) Since Admission:  Admit Weight: 86.2 kg (190 lb) (23 1346)  23: no new  23: no new  23: no new  23: no new    Estimated Needs    Weight Used For Calorie Calculations: 86.2 kg (190 lb 0.6 oz)  Energy Calorie Requirements (kcal): 1922kcal  Energy Need Method: [employer]  Weight Used For Protein Calculations: 86.2 kg (190 lb 0.6 oz)  Protein Requirements: 130gm (1.5g/kg)  Fluid Requirements (mL): 1000ml + urinary output  Temp (24hrs), Av.2 °F (36.8 °C), Min:98.1 °F (36.7 °C), Max:98.4 °F (36.9 °C)    Vtot (L/Min) for [employer] Equation Calculation: 9.3    Enteral Nutrition    Formula: Novasource Renal  Rate/Volume: 45ml/hr  Water Flushes: 50ml/hr q4hr  Additives/Modulars: Malachi  Route: nasogastric tube  Method: continuous  Total " Nutrition Provided by Tube Feeding, Additives, and Flushes:  Calories Provided  1800 kcal/d, 94% needs   Protein Provided  81 g/d, 62% needs   Fluid Provided  648 ml/d, N/A% needs   Continuous feeding calculations based on estimated 20 hr/d run time unless otherwise stated.     Parenteral Nutrition    Patient not receiving parenteral nutrition support at this time.    Evaluation of Received Nutrient Intake    Calories: meeting estimated needs  Protein: not meeting estimated needs    Patient Education    Not applicable.    Nutrition Diagnosis     PES: Inadequate oral intake related to acute illness as evidenced by NPO post extubation/reintubation. (continues)    Interventions/Goals     Intervention(s): collaboration with other providers  Goal: Meet greater than 75% of nutritional needs by follow-up. (goal progressing)    Monitoring & Evaluation     Dietitian will monitor energy intake.  Nutrition Risk/Follow-Up: high (follow-up in 1-4 days)   Please consult if re-assessment needed sooner.

## 2023-07-31 NOTE — PROCEDURES
"Bianca Khan is a 58 y.o. male patient.    Temp: 98.7 °F (37.1 °C) (07/31/23 1200)  Pulse: 65 (07/31/23 1430)  Resp: 20 (07/31/23 1430)  BP: 125/75 (07/31/23 1430)  SpO2: 97 % (07/31/23 1430)  Weight: 86.2 kg (190 lb) (07/04/23 1114)  Height: 5' 9.69" (177 cm) (07/24/23 1048)       Bladder Cath    Date/Time: 7/31/2023 3:28 PM  Location procedure was performed: [facility]    Performed by: DESTINY Carl  Authorized by: DESTINY Carl      Indications: neurogenic bladder and urine output monitoring  Preparation: Patient was prepped and draped in the usual sterile fashion.  Catheter insertion: indwelling  Catheter type: SP tube.  Catheter size: 22 Fr  Complicated insertion: no  Altered anatomy: no  Bladder irrigation: no  Number of attempts: 1  Patient tolerance: Patient tolerated the procedure well with no immediate complications          7/31/2023    "

## 2023-07-31 NOTE — PROGRESS NOTES
[facility] - [facility] ICU  Pulmonary Critical Care Note    Patient Name: Bianca Khan  MRN: [id number]  Admission Date: 6/28/2023  Hospital Length of Stay: 33 days  Code Status: Full Code  Attending Provider: Khris Treadwell Jr., MD, *  Primary Care Provider: Areli Vargas PA-C     Subjective:     HPI:     Bianca Khan is a 58 y.o. male with PMH of paraplegia 2/2 spinal cord injury (T1-T6), neurogenic bladder with suprapubic catheter, chronic right ankle osteomyelitis, DM II, HTN, who presented to the ED on 6/28/2023 with CC of right knee pain. Per chart review, CT of right knee revealed 5 cm area of subcutaneous fluid in prepatellar region which was aspirated in the ED; he was taken to the OR on 6/29/2023 by orthopedic surgery team and was found to have prepatellar bursa infection and septic arthritis. Wound culture 6/29/2023 positive for strep agalactiae. Orthopedic surgery team recommended right-sided above-knee amputation d/t chronically infected hardware in right lower extremity. On 7/4/2023, a RRT was called d/t acute respiratory distress that was not improving despite being placed on vapotherm at 35 L/min with FiO2 100%. At the time of examination, patient's initial GCS was 10 but progressively reduced to a GCS of 6. Shared decision with patient's wife was made to intubate patient for airway protection though ABGs were within normal limits. He was admitted to the ICU for close monitoring and further medical management.  The patient underwent incision and drainage of his right knee on 06/29.  He was admitted to the ICU and intubated for airway protection due to altered mental status on 7/4 with placement of hemodialysis catheter with initiation of hemodialysis.  Patient was subsequently extubated on 07/18 for the 2nd time.  He was reintubated on 07/21 secondary to worsening hypoxia and altered mental status with associated mucus plugging.  Status post bronchoscopy x3 since admission to the  ICU for hypoxia associated with mucus plugging.  He has been off of hemodialysis for several days now.    24 Hour Interval History:   Awaiting tracheostomy and PEG tube placement.  He remains intubated and sedated on propofol and Precedex. On 3 mg of cleviprex can restart lisinopril after tracheostomy and attempt to wean off cleviprix. Patient tube feeding has been stopped  and lovenox held.    Past Medical History:   Diagnosis Date    Arthritis     Chronic ulcer of ankle 05/26/2022    Frequent UTI 07/02/2019    Generalized anxiety disorder 05/26/2022    Neurogenic bladder 05/26/2022    Osteomyelitis 05/26/2022    Presence of suprapubic catheter 05/26/2022    Pure hypercholesterolemia 05/26/2022    Retention of urine, unspecified 08/09/2019    Spinal cord injury at T1-T6 level 04/20/2018       Past Surgical History:   Procedure Laterality Date    INCISION AND DRAINAGE, LOWER EXTREMITY Right 6/29/2023    Procedure: INCISION AND DRAINAGE, LOWER EXTREMITY;  Surgeon: Prabhu Shen DO;  Location: [facility];  Service: Orthopedics;  Laterality: Right;  supine bone foam wash stuff cultures    INSERTION OF INTRAMEDULLARY MARISA Right 8/10/2022    Procedure: INSERTION, INTRAMEDULLARY MARISA RIGHT TIBIA;  Surgeon: Jorge Orellana MD;  Location: [facility];  Service: Orthopedics;  Laterality: Right;       Social History     Socioeconomic History    Marital status:    Tobacco Use    Smoking status: Every Day     Current packs/day: 0.00     Types: Cigars, Cigarettes    Smokeless tobacco: Never   Substance and Sexual Activity    Alcohol use: Yes     Alcohol/week: 2.0 standard drinks of alcohol     Types: 2 Cans of beer per week    Drug use: Not Currently    Sexual activity: Never       Current Outpatient Medications   Medication Instructions    amitriptyline (ELAVIL) 50 mg, Oral, Nightly    amLODIPine (NORVASC) 10 MG tablet 1 tablet    atorvastatin (LIPITOR) 20 mg, Oral, Nightly    busPIRone (BUSPAR) 5 MG Tab 1 tablet    carvediloL  (COREG) 12.5 mg, Oral    cyclobenzaprine (FLEXERIL) 10 mg, Oral, 2 times daily PRN    doxycycline (VIBRAMYCIN) 100 mg, Oral, Daily    fenofibrate 160 mg, Oral    FLUoxetine 20 mg, Oral    gabapentin (NEURONTIN) 300 MG capsule 1 capsule    lisinopriL (PRINIVIL,ZESTRIL) 10 mg, Oral, Nightly    lisinopriL 10 mg, Oral, Daily    LORazepam (ATIVAN) 1 mg, Oral, 2 times daily    melatonin (MELATIN) 3 mg tablet TAKE TWO TABLETS BY MOUTH EVERY NIGHT AT BEDTIME AS NEEDED FOR INSOMNIA.    meloxicam (MOBIC) 15 mg, Oral, Daily    metFORMIN (GLUCOPHAGE) 500 MG tablet SMARTSI Tablet(s) By Mouth Morning-Evening    oxybutynin (DITROPAN) 5 mg, Oral, 2 times daily    oxyCODONE-acetaminophen (PERCOCET)  mg per tablet 1 tablet, Oral, Every 6 hours PRN    pantoprazole (PROTONIX) 40 MG tablet 1 tablet    temazepam (RESTORIL) 30 mg, Oral, Nightly    traZODone (DESYREL) 50 mg, Oral, Nightly    XARELTO 20 mg Tab SMARTSI Tablet(s) By Mouth Every Evening       Current Inpatient Medications   albuterol-ipratropium  3 mL Nebulization BID    amLODIPine  10 mg Per NG tube Daily    atorvastatin  20 mg Per NG tube Nightly    carvediloL  25 mg Per NG tube BID    cefTRIAXone (ROCEPHIN) IVPB  2 g Intravenous Q24H    cloNIDine 0.2 mg/24 hr td ptwk  1 patch Transdermal Q7 Days    doxycycline  100 mg Per NG tube Q12H    enoxaparin  30 mg Subcutaneous Q12H    famotidine (PF)  20 mg Intravenous Daily    fenofibrate  48 mg Per NG tube Daily    guaiFENesin 100 mg/5 ml  400 mg Per NG tube Q4H    miconazole NITRATE 2 %   Topical (Top) BID    oxybutynin  5 mg Per NG tube BID    senna-docusate 8.6-50 mg  2 tablet Per NG tube BID    sodium chloride 3%  4 mL Nebulization BID    sodium citrate-citric acid 500-334 mg/5 ml  30 mL Oral Once     Current Intravenous Infusions   clevidipine 3 mg/hr (23)    dexmedeTOMIDine (Precedex) infusion (titrating) 1 mcg/kg/hr (23)    NORepinephrine bitartrate-D5W Stopped (23 7239)    propofoL  50 mcg/kg/min (07/31/23 0158)       Objective:       Intake/Output Summary (Last 24 hours) at 7/31/2023 0323  Last data filed at 7/31/2023 0200  Gross per 24 hour   Intake 3727.25 ml   Output 4500 ml   Net -772.75 ml       Vital Signs (Most Recent):  Temp: 98.2 °F (36.8 °C) (07/31/23 0000)  Pulse: 67 (07/31/23 0300)  Resp: 20 (07/31/23 0300)  BP: 125/73 (07/31/23 0300)  SpO2: 100 % (07/31/23 0300)  Body mass index is 27.51 kg/m².  Weight: 86.2 kg (190 lb) Vital Signs (24h Range):  Temp:  [97.9 °F (36.6 °C)-98.6 °F (37 °C)] 98.2 °F (36.8 °C)  Pulse:  [60-80] 67  Resp:  [16-27] 20  SpO2:  [91 %-100 %] 100 %  BP: (121-158)/() 125/73     Physical Exam  General:   no acute respiratory distress  Head: Normocephalic, atraumatic  Eyes: PERRL, EOMI, anicteric sclera  Neck: supple  CVS:  Irregularly irregular 2+ peripheral pulses  Resp: Coarse breath sounds bilaterally   GI:  Abdomen soft, non-distended, normoactive bowel sounds. Suprapubic catheter in place.  MSK: Wound VAC right knee  Skin: No rashes, ulcers, erythema  Neuro: Cough, gag, cornea and pupillary reflex intact      Lines/Drains/Airways       Drain  Duration                  Suprapubic Catheter 07/06/23 1544 100% silicone 22 Fr. 24 days         NG/OG Tube 07/21/23 1000 Hutchinson sump 16 Fr. Left nostril 9 days              Airway  Duration                  Airway - Non-Surgical 07/21/23 1655 9 days              Peripheral Intravenous Line  Duration                  Midline Catheter Insertion/Assessment  - Single Lumen 07/22/23 1603 Left basilic vein (medial side of arm) other (see comments) 8 days         Peripheral IV - Single Lumen 07/27/23 2145 20 G Posterior;Left Hand 3 days                  Significant Labs:    Lab Results   Component Value Date    WBC 6.83 07/31/2023    HGB 9.7 (L) 07/31/2023    HCT 32.1 (L) 07/31/2023    MCV 80.5 07/31/2023     (H) 07/31/2023     BMP  Lab Results   Component Value Date     07/31/2023    K 4.2 07/31/2023     CHLORIDE 103 07/31/2023    CO2 26 07/31/2023    BUN 18.6 07/31/2023    CREATININE 0.79 07/31/2023    CALCIUM 9.1 07/31/2023    AGAP 10.0 07/30/2023    ESTGFRAFRICA 101 05/11/2022    EGFRNONAA >60 04/27/2022     ABG  Recent Labs   Lab 07/30/23 0523   PH 7.450   PO2 56.0*   HCO3 31.3       Mechanical Ventilation Support:  Vent Mode: A/C (07/31/23 0300)  Ventilator Initiated: Yes (07/21/23 1655)  Set Rate: 20 BPM (07/31/23 0300)  Vt Set: 450 mL (07/31/23 0300)  Pressure Support: 12 cmH20 (07/29/23 2009)  PEEP/CPAP: 5 cmH20 (07/31/23 0300)  Oxygen Concentration (%): 30 (07/31/23 0200)  Peak Airway Pressure: 20 cmH20 (07/31/23 0300)  Total Ve: 8.6 L/m (07/31/23 0300)  F/VT Ratio<105 (RSBI): (!) 46.73 (07/31/23 0300)    Significant Imaging:  I have reviewed the pertinent imaging within the past 24 hours.    Assessment/Plan:     Assessment  Septic arthritis of the right knee with Strep agalactiae s/p I&D on 06/29/2023 with wound VAC in place currently on doxycycline for suppressive therapy  Acute hypoxemic respiratory failure with associated pneumonia in addition to pulmonary edema on mechanical ventilation s/p intubation x3  ANTON with CKD 4 s/p multiple sessions HD currently not receiving hemodialysis   Hypoactive delirium   Paroxysmal a-fib with permanent pacemaker in place   Hx of paraplegia secondary to spinal cord injury at level T1 with associated neurogenic bladder and chronic right ankle osteomyelitis  DM II  HTN      Plan  - Titrate mechanical ventilation per ARDS net protocol, maintain SpO2 94-96%  - Tentative plans for tracheostomy and PEG tube placement likely Monday if they are able to obtain consent from wife  - Continue wound VAC per orthopedic surgery recommendations   - ID recommendations doxycycline for suppressive therapy through 08/09    DVT Prophylaxis: Lovenox  GI Prophylaxis: Pepcid     32 minutes of critical care was time spent personally by me on the following activities: development of treatment  plan with patient or surrogate and bedside caregivers, discussions with consultants, evaluation of patient's response to treatment, examination of patient, ordering and performing treatments and interventions, ordering and review of laboratory studies, ordering and review of radiographic studies, pulse oximetry, re-evaluation of patient's condition.  This critical care time did not overlap with that of any other provider or involve time for any procedures.     George Shah MD PGY-III  Pulmonary Critical Care Medicine  [facility] - [address] [place] [address]

## 2023-07-31 NOTE — PLAN OF CARE
Spoke to patients wife and she informed me she did not want to sign consents till she returned home from [place]. As of today she plans to be home late tomorrow. She has been updated on patient current status and treatment plan. All questions encouraged and addressed.

## 2023-07-31 NOTE — NURSING
Nurses Note -- 4 Eyes      7/31/2023   2:28 AM      Skin assessed during: Daily Assessment      [x] No Altered Skin Integrity Present    [x]Prevention Measures Documented      [] Yes- Altered Skin Integrity Present or Discovered   [] LDA Added if Not in Epic (Describe Wound)   [] New Altered Skin Integrity was Present on Admit and Documented in LDA   [] Wound Image Taken    Wound Care Consulted? Yes    Attending Nurse:  Marilou Veloz RN     Second RN/Staff Member:  Lauren Townsend RN

## 2023-08-01 LAB
ALBUMIN SERPL-MCNC: 2.4 G/DL (ref 3.5–5)
ALBUMIN/GLOB SERPL: 0.5 RATIO (ref 1.1–2)
ALP SERPL-CCNC: 81 UNIT/L (ref 40–150)
ALT SERPL-CCNC: 19 UNIT/L (ref 0–55)
AST SERPL-CCNC: 22 UNIT/L (ref 5–34)
BASOPHILS # BLD AUTO: 0.02 X10(3)/MCL
BASOPHILS NFR BLD AUTO: 0.3 %
BILIRUBIN DIRECT+TOT PNL SERPL-MCNC: 0.2 MG/DL
BUN SERPL-MCNC: 22.2 MG/DL (ref 8.4–25.7)
CALCIUM SERPL-MCNC: 8.9 MG/DL (ref 8.4–10.2)
CHLORIDE SERPL-SCNC: 103 MMOL/L (ref 98–107)
CO2 SERPL-SCNC: 25 MMOL/L (ref 22–29)
CREAT SERPL-MCNC: 0.82 MG/DL (ref 0.73–1.18)
EOSINOPHIL # BLD AUTO: 0.08 X10(3)/MCL (ref 0–0.9)
EOSINOPHIL NFR BLD AUTO: 1.1 %
ERYTHROCYTE [DISTWIDTH] IN BLOOD BY AUTOMATED COUNT: 22.1 % (ref 11.5–17)
GFR SERPLBLD CREATININE-BSD FMLA CKD-EPI: >60 MLS/MIN/1.73/M2
GLOBULIN SER-MCNC: 4.7 GM/DL (ref 2.4–3.5)
GLUCOSE SERPL-MCNC: 150 MG/DL (ref 74–100)
HCT VFR BLD AUTO: 30 % (ref 42–52)
HGB BLD-MCNC: 9 G/DL (ref 14–18)
IMM GRANULOCYTES # BLD AUTO: 0.04 X10(3)/MCL (ref 0–0.04)
IMM GRANULOCYTES NFR BLD AUTO: 0.5 %
LYMPHOCYTES # BLD AUTO: 2.36 X10(3)/MCL (ref 0.6–4.6)
LYMPHOCYTES NFR BLD AUTO: 31.1 %
MCH RBC QN AUTO: 24.2 PG (ref 27–31)
MCHC RBC AUTO-ENTMCNC: 30 G/DL (ref 33–36)
MCV RBC AUTO: 80.6 FL (ref 80–94)
MONOCYTES # BLD AUTO: 0.71 X10(3)/MCL (ref 0.1–1.3)
MONOCYTES NFR BLD AUTO: 9.4 %
NEUTROPHILS # BLD AUTO: 4.37 X10(3)/MCL (ref 2.1–9.2)
NEUTROPHILS NFR BLD AUTO: 57.6 %
NRBC BLD AUTO-RTO: 0 %
PLATELET # BLD AUTO: 564 X10(3)/MCL (ref 130–400)
PMV BLD AUTO: 10.1 FL (ref 7.4–10.4)
POTASSIUM SERPL-SCNC: 4.3 MMOL/L (ref 3.5–5.1)
PROT SERPL-MCNC: 7.1 GM/DL (ref 6.4–8.3)
RBC # BLD AUTO: 3.72 X10(6)/MCL (ref 4.7–6.1)
SODIUM SERPL-SCNC: 139 MMOL/L (ref 136–145)
WBC # SPEC AUTO: 7.58 X10(3)/MCL (ref 4.5–11.5)

## 2023-08-01 PROCEDURE — 99900031 HC PATIENT EDUCATION (STAT)

## 2023-08-01 PROCEDURE — 25000242 PHARM REV CODE 250 ALT 637 W/ HCPCS: Performed by: INTERNAL MEDICINE

## 2023-08-01 PROCEDURE — 80053 COMPREHEN METABOLIC PANEL: CPT | Performed by: STUDENT IN AN ORGANIZED HEALTH CARE EDUCATION/TRAINING PROGRAM

## 2023-08-01 PROCEDURE — 25000003 PHARM REV CODE 250: Performed by: STUDENT IN AN ORGANIZED HEALTH CARE EDUCATION/TRAINING PROGRAM

## 2023-08-01 PROCEDURE — 94761 N-INVAS EAR/PLS OXIMETRY MLT: CPT

## 2023-08-01 PROCEDURE — 27000221 HC OXYGEN, UP TO 24 HOURS

## 2023-08-01 PROCEDURE — 63600175 PHARM REV CODE 636 W HCPCS: Performed by: INTERNAL MEDICINE

## 2023-08-01 PROCEDURE — 99900035 HC TECH TIME PER 15 MIN (STAT)

## 2023-08-01 PROCEDURE — 94003 VENT MGMT INPAT SUBQ DAY: CPT

## 2023-08-01 PROCEDURE — 99900026 HC AIRWAY MAINTENANCE (STAT)

## 2023-08-01 PROCEDURE — 63600175 PHARM REV CODE 636 W HCPCS

## 2023-08-01 PROCEDURE — 25000003 PHARM REV CODE 250: Performed by: INTERNAL MEDICINE

## 2023-08-01 PROCEDURE — 97605 NEG PRS WND THER DME<=50SQCM: CPT

## 2023-08-01 PROCEDURE — 27100171 HC OXYGEN HIGH FLOW UP TO 24 HOURS

## 2023-08-01 PROCEDURE — 85025 COMPLETE CBC W/AUTO DIFF WBC: CPT | Performed by: STUDENT IN AN ORGANIZED HEALTH CARE EDUCATION/TRAINING PROGRAM

## 2023-08-01 PROCEDURE — 94640 AIRWAY INHALATION TREATMENT: CPT

## 2023-08-01 PROCEDURE — 25000003 PHARM REV CODE 250

## 2023-08-01 PROCEDURE — 20000000 HC ICU ROOM

## 2023-08-01 RX ADMIN — DOXYCYCLINE HYCLATE 100 MG: 100 TABLET, COATED ORAL at 08:08

## 2023-08-01 RX ADMIN — PROPOFOL 50 MCG/KG/MIN: 10 INJECTION, EMULSION INTRAVENOUS at 11:08

## 2023-08-01 RX ADMIN — OXYBUTYNIN CHLORIDE 5 MG: 5 TABLET ORAL at 08:08

## 2023-08-01 RX ADMIN — ATORVASTATIN CALCIUM 20 MG: 10 TABLET, FILM COATED ORAL at 08:08

## 2023-08-01 RX ADMIN — PROPOFOL 50 MCG/KG/MIN: 10 INJECTION, EMULSION INTRAVENOUS at 03:08

## 2023-08-01 RX ADMIN — GUAIFENESIN 400 MG: 200 SOLUTION ORAL at 01:08

## 2023-08-01 RX ADMIN — HYDRALAZINE HYDROCHLORIDE 10 MG: 20 INJECTION INTRAMUSCULAR; INTRAVENOUS at 03:08

## 2023-08-01 RX ADMIN — CARVEDILOL 25 MG: 12.5 TABLET, FILM COATED ORAL at 08:08

## 2023-08-01 RX ADMIN — DEXMEDETOMIDINE HYDROCHLORIDE 1 MCG/KG/HR: 400 INJECTION INTRAVENOUS at 11:08

## 2023-08-01 RX ADMIN — GUAIFENESIN 400 MG: 200 SOLUTION ORAL at 06:08

## 2023-08-01 RX ADMIN — FAMOTIDINE 20 MG: 10 INJECTION, SOLUTION INTRAVENOUS at 08:08

## 2023-08-01 RX ADMIN — DEXMEDETOMIDINE HYDROCHLORIDE 1 MCG/KG/HR: 400 INJECTION INTRAVENOUS at 08:08

## 2023-08-01 RX ADMIN — OXYCODONE AND ACETAMINOPHEN 1 TABLET: 10; 325 TABLET ORAL at 08:08

## 2023-08-01 RX ADMIN — SODIUM CHLORIDE 30 MG/ML INHALATION SOLUTION 4 ML: 30 SOLUTION INHALANT at 08:08

## 2023-08-01 RX ADMIN — IPRATROPIUM BROMIDE AND ALBUTEROL SULFATE 3 ML: 2.5; .5 SOLUTION RESPIRATORY (INHALATION) at 08:08

## 2023-08-01 RX ADMIN — MICONAZOLE NITRATE 2 % TOPICAL POWDER: at 08:08

## 2023-08-01 RX ADMIN — AMLODIPINE BESYLATE 10 MG: 5 TABLET ORAL at 08:08

## 2023-08-01 RX ADMIN — DEXMEDETOMIDINE HYDROCHLORIDE 1 MCG/KG/HR: 400 INJECTION INTRAVENOUS at 03:08

## 2023-08-01 RX ADMIN — ENOXAPARIN SODIUM 30 MG: 30 INJECTION SUBCUTANEOUS at 08:08

## 2023-08-01 RX ADMIN — GUAIFENESIN 400 MG: 200 SOLUTION ORAL at 05:08

## 2023-08-01 RX ADMIN — CEFTRIAXONE SODIUM 2 G: 2 INJECTION, POWDER, FOR SOLUTION INTRAMUSCULAR; INTRAVENOUS at 11:08

## 2023-08-01 RX ADMIN — PROPOFOL 50 MCG/KG/MIN: 10 INJECTION, EMULSION INTRAVENOUS at 08:08

## 2023-08-01 RX ADMIN — GUAIFENESIN 400 MG: 200 SOLUTION ORAL at 09:08

## 2023-08-01 RX ADMIN — HYDRALAZINE HYDROCHLORIDE 10 MG: 20 INJECTION INTRAMUSCULAR; INTRAVENOUS at 07:08

## 2023-08-01 RX ADMIN — SENNOSIDES AND DOCUSATE SODIUM 2 TABLET: 50; 8.6 TABLET ORAL at 08:08

## 2023-08-01 RX ADMIN — FENOFIBRATE 48 MG: 48 TABLET, FILM COATED ORAL at 08:08

## 2023-08-01 RX ADMIN — PROPOFOL 50 MCG/KG/MIN: 10 INJECTION, EMULSION INTRAVENOUS at 05:08

## 2023-08-01 RX ADMIN — DEXMEDETOMIDINE HYDROCHLORIDE 1 MCG/KG/HR: 400 INJECTION INTRAVENOUS at 05:08

## 2023-08-01 NOTE — PROGRESS NOTES
Infectious Diseases Progress Note  58-year-old male with past medical history of T-spine cord injury with paraplegia, diabetes, HTN, hepatitis-C, chronic right ankle wound/osteomyelitis, was on suppressive doxycycline, known to my service, seen by us initially at [facility] and has followed with us at the office for chronic osteomyelitis, is admitted to [facility] on 06/28/2023 presenting through the ED with complaints of pain and swelling to his right knee, apparently struck his knee.  He did also report prior remote right knee surgery.  He was evaluated and noted to have no fevers initially but a temperature of 100.2° today 6/29, no leukocytosis but with thrombocytosis of up to 531 today.  .2 and ESR >130.  He is anemic with low albumin.  Blood cultures have been negative.  CT scan of the right knee noted a 5 cm area of subcutaneous fluid collection in the prepatellar region.  There was aspiration in the ER with findings of purulent fluid and cultures showing group B Streptococcus.  He was seen by the orthopedic surgery team with findings of right prepatellar bursa abscess and is status post incision and drainage of right knee septic arthritis and right prepatellar bursa abscess today 6/29 with group B Streptococcus  He is on ceftriaxone.    Subjective:  Interval history noted, remains orally intubated on ventilator in the ICU.  No new complaints, low-grade temp noted calm doing about the same.      Past Medical History:   Diagnosis Date    Arthritis     Chronic ulcer of ankle 05/26/2022    Frequent UTI 07/02/2019    Generalized anxiety disorder 05/26/2022    Neurogenic bladder 05/26/2022    Osteomyelitis 05/26/2022    Presence of suprapubic catheter 05/26/2022    Pure hypercholesterolemia 05/26/2022    Retention of urine, unspecified 08/09/2019    Spinal cord injury at T1-T6 level 04/20/2018     Past Surgical History:   Procedure Laterality Date    INCISION AND  DRAINAGE, LOWER EXTREMITY Right 6/29/2023    Procedure: INCISION AND DRAINAGE, LOWER EXTREMITY;  Surgeon: Prabhu Shen DO;  Location: [facility] OR;  Service: Orthopedics;  Laterality: Right;  supine bone foam wash stuff cultures    INSERTION OF INTRAMEDULLARY MARISA Right 8/10/2022    Procedure: INSERTION, INTRAMEDULLARY MARISA RIGHT TIBIA;  Surgeon: Jorge Orellana MD;  Location: [facility] OR;  Service: Orthopedics;  Laterality: Right;     Social History     Socioeconomic History    Marital status:    Tobacco Use    Smoking status: Every Day     Current packs/day: 0.00     Types: Cigars, Cigarettes    Smokeless tobacco: Never   Substance and Sexual Activity    Alcohol use: Yes     Alcohol/week: 2.0 standard drinks of alcohol     Types: 2 Cans of beer per week    Drug use: Not Currently    Sexual activity: Never       Review of Systems   Unable to perform ROS: Intubated       Review of patient's allergies indicates:   Allergen Reactions    Baclofen Itching and Anxiety         Scheduled Meds:   albuterol-ipratropium  3 mL Nebulization BID    amLODIPine  10 mg Per NG tube Daily    atorvastatin  20 mg Per NG tube Nightly    carvediloL  25 mg Per NG tube BID    cefTRIAXone (ROCEPHIN) IVPB  2 g Intravenous Q24H    cloNIDine 0.2 mg/24 hr td ptwk  1 patch Transdermal Q7 Days    doxycycline  100 mg Per NG tube Q12H    enoxaparin  30 mg Subcutaneous Q12H    famotidine (PF)  20 mg Intravenous Daily    fenofibrate  48 mg Per NG tube Daily    guaiFENesin 100 mg/5 ml  400 mg Per NG tube Q4H    miconazole NITRATE 2 %   Topical (Top) BID    oxybutynin  5 mg Per NG tube BID    senna-docusate 8.6-50 mg  2 tablet Per NG tube BID    sodium chloride 3%  4 mL Nebulization BID    sodium citrate-citric acid 500-334 mg/5 ml  30 mL Oral Once     Continuous Infusions:   clevidipine Stopped (07/31/23 2000)    dexmedeTOMIDine (Precedex) infusion (titrating) 1 mcg/kg/hr (07/31/23 2305)    NORepinephrine bitartrate-D5W Stopped (07/12/23 1039)     "propofoL 50 mcg/kg/min (23 8230)     PRN Meds:0.9%  NaCl infusion (for blood administration), acetaminophen, etomidate, [] fentaNYL **FOLLOWED BY** fentaNYL, fentaNYL, hydrALAZINE, labetalol, oxyCODONE-acetaminophen, rocuronium, sodium chloride 0.9%, sodium chloride 0.9%    Objective:  /72   Pulse 73   Temp 99.4 °F (37.4 °C) (Oral)   Resp (!) 24   Ht 5' 9.69" (1.77 m)   Wt 86.2 kg (190 lb)   SpO2 95%   BMI 27.51 kg/m²     Physical Exam:   Physical Exam  Vitals reviewed.   HENT:      Head: Normocephalic.   Cardiovascular:      Rate and Rhythm: Normal rate and regular rhythm.   Pulmonary:      Effort: Pulmonary effort is normal. No respiratory distress.      Breath sounds: B/L BS coarse. No wheezing.      Comments: On vent  Abdominal:      General: Bowel sounds are normal. There is no distension.     Palpations: Abdomen is soft.      Tenderness: There is no abdominal tenderness.   Genitourinary:     Comments: Suprapubic catheter  Musculoskeletal:      Cervical back: Normal range of motion.      Comments: Paraplegic   Skin:     Findings: No rash.      Comments: Wounds dressed. R knee with wound vac in place   Neurological:      Mental Status: Unable to fully assess, pt intubated and on vent  Psychiatric:         Behavior: Sedated        Imaging  Imaging Results              CT Knee W W/O Contrast Right (Final result)  Result time 23 18:37:42      Final result by Gustavo Cassidy MD (23 18:37:42)                   Impression:      Mildly limited assessment.  5 cm area of subcutaneous fluid in the prepatellar region may have thin peripheral enhancement.  Fluid collection is possible.    Subcutaneous edema in the calf.      Electronically signed by: Gustavo Cassidy  Date:    2023  Time:    18:37               Narrative:    EXAMINATION:  CT KNEE W W/O CONTRAST RIGHT    CLINICAL HISTORY:  possible infection;    TECHNIQUE:  CT imaging of the right knee before and after IV contrast.  " Axial, coronal and sagittal reformatted images reviewed.   Dose length product is 585 mGycm. Automatic exposure control, adjustment of mA/kV or iterative reconstruction technique used to limit radiation dose.    COMPARISON:  Radiograph 06/28/2023    FINDINGS:  Assessment mildly limited by motion.  Joint alignments are maintained with degenerative changes at the knee.  Fixation hardware in the lower femur and tibia with remote proximal fibular fracture.  Areas of heterotopic ossification along the lower portion of the femur.  Small knee effusion.  Areas of subcutaneous edema anteriorly below the knee.  More focal area of fluid in the subcutaneous tissues of the prepatellar region measures up to 5 cm in transverse diameter and 5 cm in length.  There is image noise from the hardware, but this fluid may have thin areas of peripheral enhancement.  No tracking subcutaneous gas.                                       X-Ray Tibia Fibula 2 View Right (Final result)  Result time 06/28/23 18:14:06      Final result by Tracy Zamudio MD (06/28/23 18:14:06)                   Impression:      Posttraumatic and postsurgical changes at the femur and lower leg.  There is chronic deformity at the distal femur with heterogeneous sclerosis and lucency superimposed on background bony demineralization.  No old imaging of this region available for comparison.  This makes it difficult to evaluate for acute superimposed lytic change.    Postsurgical and posttraumatic change at the tibia and fibula with bony demineralization.  No appreciable acute osseous abnormality.      Electronically signed by: Tracy Zamudio  Date:    06/28/2023  Time:    18:14               Narrative:    EXAMINATION:  XR FEMUR 2 VIEW RIGHT; XR TIBIA FIBULA 2 VIEW RIGHT    CLINICAL HISTORY:  possible infection;; infection;    TECHNIQUE:  AP and lateral views of the right femur were performed.    AP and lateral views of the right tibia and fibula were  obtained.    COMPARISON:  Knee radiographs 06/28/2023, tibia and fibular radiographs 08/10/2022    FINDINGS:  There are postsurgical changes of ORIF at the distal femur with lateral plate and screws.  There are postsurgical changes of ORIF at the tibia with antegrade intramedullary raven and proximal and distal interlocking screws.  Hardware appears intact.  No acute fracture seen.  There are old, healed fracture deformities.    There is chronic remodeling at the distal femur with heterogeneous appearance of the marrow and cortex distally.  There are areas of lucency as well as sclerosis.  There is no imaging through the distal femur available for comparison to evaluate for acute lytic changes superimposed on chronic background posttraumatic and postsurgical change.  Older prior radiographs of the tibia and fibula do not adequately imaged the area.  The bones are demineralized.    There is soft tissue swelling at the knee.  There is soft tissue swelling at the ankle.                                       X-Ray Femur 2 View Right (Final result)  Result time 06/28/23 18:14:06      Final result by Tracy Zamudio MD (06/28/23 18:14:06)                   Impression:      Posttraumatic and postsurgical changes at the femur and lower leg.  There is chronic deformity at the distal femur with heterogeneous sclerosis and lucency superimposed on background bony demineralization.  No old imaging of this region available for comparison.  This makes it difficult to evaluate for acute superimposed lytic change.    Postsurgical and posttraumatic change at the tibia and fibula with bony demineralization.  No appreciable acute osseous abnormality.      Electronically signed by: Tracy Zamudio  Date:    06/28/2023  Time:    18:14               Narrative:    EXAMINATION:  XR FEMUR 2 VIEW RIGHT; XR TIBIA FIBULA 2 VIEW RIGHT    CLINICAL HISTORY:  possible infection;; infection;    TECHNIQUE:  AP and lateral views of the right femur  were performed.    AP and lateral views of the right tibia and fibula were obtained.    COMPARISON:  Knee radiographs 06/28/2023, tibia and fibular radiographs 08/10/2022    FINDINGS:  There are postsurgical changes of ORIF at the distal femur with lateral plate and screws.  There are postsurgical changes of ORIF at the tibia with antegrade intramedullary raven and proximal and distal interlocking screws.  Hardware appears intact.  No acute fracture seen.  There are old, healed fracture deformities.    There is chronic remodeling at the distal femur with heterogeneous appearance of the marrow and cortex distally.  There are areas of lucency as well as sclerosis.  There is no imaging through the distal femur available for comparison to evaluate for acute lytic changes superimposed on chronic background posttraumatic and postsurgical change.  Older prior radiographs of the tibia and fibula do not adequately imaged the area.  The bones are demineralized.    There is soft tissue swelling at the knee.  There is soft tissue swelling at the ankle.                                       X-Ray Knee 3 View Right (Final result)  Result time 06/28/23 14:18:03      Final result by Lanette Waller MD (06/28/23 14:18:03)                   Impression:      1. No acute bony abnormality.  2. Soft tissue swelling around the knee.      Electronically signed by: Lanette Waller  Date:    06/28/2023  Time:    14:18               Narrative:    EXAMINATION:  XR KNEE 3 VIEW RIGHT    CLINICAL HISTORY:  Effusion, right knee    COMPARISON:  None.    FINDINGS:  There has been prior internal fixation of the femur and tibia.  There are chronic changes of the bones with heterotopic ossification around the knee.  There is no acute fracture identified.  There is soft tissue swelling around the knee.                                       Lab Review   Recent Results (from the past 24 hour(s))   Phosphorus    Collection Time: 07/31/23  1:57 AM    Result Value Ref Range    Phosphorus Level 4.2 2.3 - 4.7 mg/dL   Magnesium    Collection Time: 07/31/23  1:57 AM   Result Value Ref Range    Magnesium Level 2.10 1.60 - 2.60 mg/dL   Comprehensive Metabolic Panel    Collection Time: 07/31/23  1:57 AM   Result Value Ref Range    Sodium Level 140 136 - 145 mmol/L    Potassium Level 4.2 3.5 - 5.1 mmol/L    Chloride 103 98 - 107 mmol/L    Carbon Dioxide 26 22 - 29 mmol/L    Glucose Level 143 (H) 74 - 100 mg/dL    Blood Urea Nitrogen 18.6 8.4 - 25.7 mg/dL    Creatinine 0.79 0.73 - 1.18 mg/dL    Calcium Level Total 9.1 8.4 - 10.2 mg/dL    Protein Total 7.4 6.4 - 8.3 gm/dL    Albumin Level 2.5 (L) 3.5 - 5.0 g/dL    Globulin 4.9 (H) 2.4 - 3.5 gm/dL    Albumin/Globulin Ratio 0.5 (L) 1.1 - 2.0 ratio    Bilirubin Total 0.3 <=1.5 mg/dL    Alkaline Phosphatase 102 40 - 150 unit/L    Alanine Aminotransferase 16 0 - 55 unit/L    Aspartate Aminotransferase 19 5 - 34 unit/L    eGFR >60 mls/min/1.73/m2   CBC with Differential    Collection Time: 07/31/23  1:57 AM   Result Value Ref Range    WBC 6.83 4.50 - 11.50 x10(3)/mcL    RBC 3.99 (L) 4.70 - 6.10 x10(6)/mcL    Hgb 9.7 (L) 14.0 - 18.0 g/dL    Hct 32.1 (L) 42.0 - 52.0 %    MCV 80.5 80.0 - 94.0 fL    MCH 24.3 (L) 27.0 - 31.0 pg    MCHC 30.2 (L) 33.0 - 36.0 g/dL    RDW 22.0 (H) 11.5 - 17.0 %    Platelet 664 (H) 130 - 400 x10(3)/mcL    MPV 9.8 7.4 - 10.4 fL    Neut % 66.2 %    Lymph % 23.1 %    Mono % 8.3 %    Eos % 1.6 %    Basophil % 0.4 %    Lymph # 1.58 0.6 - 4.6 x10(3)/mcL    Neut # 4.51 2.1 - 9.2 x10(3)/mcL    Mono # 0.57 0.1 - 1.3 x10(3)/mcL    Eos # 0.11 0 - 0.9 x10(3)/mcL    Baso # 0.03 <=0.2 x10(3)/mcL    IG# 0.03 0 - 0.04 x10(3)/mcL    IG% 0.4 %    NRBC% 0.0 %             Assessment/Plan:  1. SIRS with fevers  2. Group B Streptococcus Right knee prepatellar abscess  3. Group B Streptococcus Right knee septic arthritis  4.  Anemia/reactive thrombocytosis  5.  Paraplegia secondary to T-spine cord injury  6. History of  hepatitis-C   7. Chronic right ankle wound/osteomyelitis  8.  Diabetes    9.  Acute kidney injury  10. Acute hypoxic respiratory failure  11. Pulmonary edema with pleural effusions/ Pneumonitis      -Continue ceftriaxone with end date of 08/09 and maintainance chronic suppressive doxycycline  -Low-grade temp noted without leukocytosis, thrombocytosis trending up, follow  -Stool for c-diff PCR and toxin (-)  -7/4 and 7/8 blood cultures negative and sputum culture with moderate yeast.    -7/4 CT scan of the abdomen and pelvis and 7/3 chest x-ray results noted, likely dealing with pulmonary edema with pleural effusions and possibly superimposed infectious pneumonitis.   -6/28 right knee abscess culture with Group B Streptococcus  -Seen by Orthopedic surgery team s/p I&D of R prepatellar bursa abscess and septic R knee with cultures yielding Group G Streptococcus  -6/28 blood cultures negative  -Multiple comorbidities, paraplegic with chronic pressure wounds, right ankle osteomyelitis with exposed bone on chronic suppressive doxycycline  -We will continue surgical site care per Orthopedic surgery team  -We will continue wound care  -He is to continue management of his hepatitis-C as outpatient  -Renal impairment noted, HD per Nephrology, started 7/4  -7/3 Renal ultrasound results noted  -Re-intubated on 7/21 followed by Bronchoscopy with mucus plugging noted  -Plan for PEG and tracheostomy placement on hold pending obtaining consent from wife  -Continue vent management and ICU support per Intensivist  -Discussed with nursing staff.

## 2023-08-01 NOTE — PROGRESS NOTES
[facility] - 7 North ICU  Pulmonary Critical Care Note    Patient Name: Bianca Khan  MRN: [id number]  Admission Date: 6/28/2023  Hospital Length of Stay: 34 days  Code Status: Full Code  Attending Provider: Franco Alfredo MD  Primary Care Provider: Areli Vargas PA-C     Subjective:     HPI:     Bianca Khan is a 58 y.o. male with PMH of paraplegia 2/2 spinal cord injury (T1-T6), neurogenic bladder with suprapubic catheter, chronic right ankle osteomyelitis, DM II, HTN, who presented to the ED on 6/28/2023 with CC of right knee pain. Per chart review, CT of right knee revealed 5 cm area of subcutaneous fluid in prepatellar region which was aspirated in the ED; he was taken to the OR on 6/29/2023 by orthopedic surgery team and was found to have prepatellar bursa infection and septic arthritis. Wound culture 6/29/2023 positive for strep agalactiae. Orthopedic surgery team recommended right-sided above-knee amputation d/t chronically infected hardware in right lower extremity. On 7/4/2023, a RRT was called d/t acute respiratory distress that was not improving despite being placed on vapotherm at 35 L/min with FiO2 100%. At the time of examination, patient's initial GCS was 10 but progressively reduced to a GCS of 6. Shared decision with patient's wife was made to intubate patient for airway protection though ABGs were within normal limits. He was admitted to the ICU for close monitoring and further medical management.  The patient underwent incision and drainage of his right knee on 06/29.  He was admitted to the ICU and intubated for airway protection due to altered mental status on 7/4 with placement of hemodialysis catheter with initiation of hemodialysis.  Patient was subsequently extubated on 07/18 for the 2nd time.  He was reintubated on 07/21 secondary to worsening hypoxia and altered mental status with associated mucus plugging.  Status post bronchoscopy x3 since admission to the ICU  for hypoxia associated with mucus plugging.  He has been off of hemodialysis for several days now.    24 Hour Interval History:   Awaiting tracheostomy and PEG tube placement.  He remains intubated and sedated on propofol 50 mcg and Precedex 1 mcg.   Past Medical History:   Diagnosis Date    Arthritis     Chronic ulcer of ankle 05/26/2022    Frequent UTI 07/02/2019    Generalized anxiety disorder 05/26/2022    Neurogenic bladder 05/26/2022    Osteomyelitis 05/26/2022    Presence of suprapubic catheter 05/26/2022    Pure hypercholesterolemia 05/26/2022    Retention of urine, unspecified 08/09/2019    Spinal cord injury at T1-T6 level 04/20/2018       Past Surgical History:   Procedure Laterality Date    INCISION AND DRAINAGE, LOWER EXTREMITY Right 6/29/2023    Procedure: INCISION AND DRAINAGE, LOWER EXTREMITY;  Surgeon: Prabhu Shen DO;  Location: [facility];  Service: Orthopedics;  Laterality: Right;  supine bone foam wash stuff cultures    INSERTION OF INTRAMEDULLARY MARISA Right 8/10/2022    Procedure: INSERTION, INTRAMEDULLARY MARISA RIGHT TIBIA;  Surgeon: Jorge Orellana MD;  Location: [facility];  Service: Orthopedics;  Laterality: Right;       Social History     Socioeconomic History    Marital status:    Tobacco Use    Smoking status: Every Day     Current packs/day: 0.00     Types: Cigars, Cigarettes    Smokeless tobacco: Never   Substance and Sexual Activity    Alcohol use: Yes     Alcohol/week: 2.0 standard drinks of alcohol     Types: 2 Cans of beer per week    Drug use: Not Currently    Sexual activity: Never       Current Outpatient Medications   Medication Instructions    amitriptyline (ELAVIL) 50 mg, Oral, Nightly    amLODIPine (NORVASC) 10 MG tablet 1 tablet    atorvastatin (LIPITOR) 20 mg, Oral, Nightly    busPIRone (BUSPAR) 5 MG Tab 1 tablet    carvediloL (COREG) 12.5 mg, Oral    cyclobenzaprine (FLEXERIL) 10 mg, Oral, 2 times daily PRN    doxycycline (VIBRAMYCIN) 100 mg, Oral, Daily    fenofibrate  160 mg, Oral    FLUoxetine 20 mg, Oral    gabapentin (NEURONTIN) 300 MG capsule 1 capsule    lisinopriL (PRINIVIL,ZESTRIL) 10 mg, Oral, Nightly    lisinopriL 10 mg, Oral, Daily    LORazepam (ATIVAN) 1 mg, Oral, 2 times daily    melatonin (MELATIN) 3 mg tablet TAKE TWO TABLETS BY MOUTH EVERY NIGHT AT BEDTIME AS NEEDED FOR INSOMNIA.    meloxicam (MOBIC) 15 mg, Oral, Daily    metFORMIN (GLUCOPHAGE) 500 MG tablet SMARTSI Tablet(s) By Mouth Morning-Evening    oxybutynin (DITROPAN) 5 mg, Oral, 2 times daily    oxyCODONE-acetaminophen (PERCOCET)  mg per tablet 1 tablet, Oral, Every 6 hours PRN    pantoprazole (PROTONIX) 40 MG tablet 1 tablet    temazepam (RESTORIL) 30 mg, Oral, Nightly    traZODone (DESYREL) 50 mg, Oral, Nightly    XARELTO 20 mg Tab SMARTSI Tablet(s) By Mouth Every Evening       Current Inpatient Medications   albuterol-ipratropium  3 mL Nebulization BID    amLODIPine  10 mg Per NG tube Daily    atorvastatin  20 mg Per NG tube Nightly    carvediloL  25 mg Per NG tube BID    cefTRIAXone (ROCEPHIN) IVPB  2 g Intravenous Q24H    cloNIDine 0.2 mg/24 hr td ptwk  1 patch Transdermal Q7 Days    doxycycline  100 mg Per NG tube Q12H    enoxaparin  30 mg Subcutaneous Q12H    famotidine (PF)  20 mg Intravenous Daily    fenofibrate  48 mg Per NG tube Daily    guaiFENesin 100 mg/5 ml  400 mg Per NG tube Q4H    miconazole NITRATE 2 %   Topical (Top) BID    oxybutynin  5 mg Per NG tube BID    senna-docusate 8.6-50 mg  2 tablet Per NG tube BID    sodium chloride 3%  4 mL Nebulization BID    sodium citrate-citric acid 500-334 mg/5 ml  30 mL Oral Once     Current Intravenous Infusions   clevidipine Stopped (23)    dexmedeTOMIDine (Precedex) infusion (titrating) 1 mcg/kg/hr (23)    NORepinephrine bitartrate-D5W Stopped (23)    propofoL 50 mcg/kg/min (23)       Objective:       Intake/Output Summary (Last 24 hours) at 2023 0427  Last data filed at 2023  1745  Gross per 24 hour   Intake 2708.04 ml   Output 675 ml   Net 2033.04 ml       Vital Signs (Most Recent):  Temp: 98.1 °F (36.7 °C) (08/01/23 0000)  Pulse: 81 (08/01/23 0400)  Resp: 20 (08/01/23 0400)  BP: (!) 140/83 (08/01/23 0400)  SpO2: 100 % (08/01/23 0400)  Body mass index is 27.51 kg/m².  Weight: 86.2 kg (190 lb) Vital Signs (24h Range):  Temp:  [98.1 °F (36.7 °C)-99.4 °F (37.4 °C)] 98.1 °F (36.7 °C)  Pulse:  [] 81  Resp:  [18-29] 20  SpO2:  [91 %-100 %] 100 %  BP: (114-167)/() 140/83     Physical Exam  General:   no acute respiratory distress  Head: Normocephalic, atraumatic  Eyes: PERRL, EOMI, anicteric sclera  Neck: supple  CVS:  RRR, S1 and S2 auscultated and 2+ peripheral pulses  Resp: CTAB on anterior chest wall  GI:  Abdomen soft, non-distended, normoactive bowel sounds. Suprapubic catheter in place.  MSK: Wound VAC right knee  Skin: No rashes, ulcers, erythema  Neuro: Cough, gag, cornea and pupillary reflex intact      Lines/Drains/Airways       Drain  Duration                  NG/OG Tube 07/21/23 1000 Wilbur sump 16 Fr. Left nostril 10 days         Suprapubic Catheter 07/31/23 1410 22 Fr. <1 day              Airway  Duration                  Airway - Non-Surgical 07/21/23 1655 10 days              Peripheral Intravenous Line  Duration                  Midline Catheter Insertion/Assessment  - Single Lumen 07/22/23 1603 Left basilic vein (medial side of arm) other (see comments) 9 days         Peripheral IV - Single Lumen 07/27/23 2145 20 G Posterior;Left Hand 4 days                  Significant Labs:    Lab Results   Component Value Date    WBC 7.58 08/01/2023    HGB 9.0 (L) 08/01/2023    HCT 30.0 (L) 08/01/2023    MCV 80.6 08/01/2023     (H) 08/01/2023     BMP  Lab Results   Component Value Date     08/01/2023    K 4.3 08/01/2023    CHLORIDE 103 08/01/2023    CO2 25 08/01/2023    BUN 22.2 08/01/2023    CREATININE 0.82 08/01/2023    CALCIUM 8.9 08/01/2023    AGAP 10.0  07/30/2023    ESTGFRAFRICA 101 05/11/2022    EGFRNONAA >60 04/27/2022     ABG  Recent Labs   Lab 07/30/23 0523   PH 7.450   PO2 56.0*   HCO3 31.3       Mechanical Ventilation Support:  Vent Mode: A/C (08/01/23 0231)  Ventilator Initiated: Yes (07/21/23 1655)  Set Rate: 20 BPM (08/01/23 0231)  Vt Set: 450 mL (08/01/23 0231)  Pressure Support: 12 cmH20 (07/31/23 0736)  PEEP/CPAP: 5 cmH20 (08/01/23 0231)  Oxygen Concentration (%): 30 (08/01/23 0231)  Peak Airway Pressure: 23 cmH20 (08/01/23 0231)  Total Ve: 8.4 L/m (08/01/23 0231)  F/VT Ratio<105 (RSBI): (!) 44.84 (07/31/23 0736)    Significant Imaging:  I have reviewed the pertinent imaging within the past 24 hours.    Assessment/Plan:     Assessment  Septic arthritis of the right knee with Strep agalactiae s/p I&D on 06/29/2023 with wound VAC in place currently on doxycycline for suppressive therapy  Acute hypoxemic respiratory failure with associated pneumonia in addition to pulmonary edema on mechanical ventilation s/p intubation x3  ANTON with CKD 4 s/p multiple sessions HD currently not receiving hemodialysis   Hypoactive delirium   Paroxysmal a-fib with permanent pacemaker in place   Hx of paraplegia secondary to spinal cord injury at level T1 with associated neurogenic bladder and chronic right ankle osteomyelitis  DM II  HTN      Plan  - Titrate mechanical ventilation per ARDS net protocol, maintain SpO2 94-96%  - plans for tracheostomy and PEG tube placement if they are able to obtain consent from wife  - Continue wound VAC per orthopedic surgery recommendations   - ID recommendations doxycycline for suppressive therapy through 08/09    DVT Prophylaxis: Lovenox  GI Prophylaxis: Pepcid     32 minutes of critical care was time spent personally by me on the following activities: development of treatment plan with patient or surrogate and bedside caregivers, discussions with consultants, evaluation of patient's response to treatment, examination of patient,  ordering and performing treatments and interventions, ordering and review of laboratory studies, ordering and review of radiographic studies, pulse oximetry, re-evaluation of patient's condition.  This critical care time did not overlap with that of any other provider or involve time for any procedures.     George Shah MD PGY-III  Pulmonary Critical Care Medicine  [facility] - [facility] ICU

## 2023-08-01 NOTE — PROGRESS NOTES
Progress Note  Nephrology    Admit Date: 6/28/2023   LOS: 34 days     SUBJECTIVE:     Follow-up For:  ANTON / ATN    Interval History:  57 Y/O with history of Paraplegia , suprapibic cath with frequent infection and DM2 , admitted for Right knee osteomyelitis   Started on antibiotics but developed ANTON / ATN and had a few dialysis sessions   Bun and creatinin improved and now he is off of dialysis and temporary dialysis cath is out   Pt still intubated and on vent and on NG tube feeding     Review of Systems:  Constitutional: No fever or chills  Respiratory: No cough or shortness of breath  Cardiovascular: No chest pain or palpitations  Gastrointestinal: No nausea or vomiting  Neurological: intubated on vent and sedated     OBJECTIVE:     Vital Signs Range (Last 24H):  Vitals:    08/01/23 1230   BP: (!) 164/86   Pulse: 61   Resp: 20   Temp:        Temp:  [98.1 °F (36.7 °C)-99.4 °F (37.4 °C)]   Pulse:  []   Resp:  [19-29]   BP: (121-167)/()   SpO2:  [91 %-100 %]     I & O (Last 24H):  Intake/Output Summary (Last 24 hours) at 8/1/2023 1425  Last data filed at 8/1/2023 1200  Gross per 24 hour   Intake 1301.04 ml   Output 1600 ml   Net -298.96 ml       Physical Exam:  On vent   Head   normal   Neck   supple   Chest   symmetric   Lungs   clear   Heart   RRR  Abdomen  soft , non tender   Ext   no edema     Laboratory Data:    Recent Labs   Lab 07/31/23  0157 08/01/23  0137    139   K 4.2 4.3   CO2 26 25   BUN 18.6 22.2   CREATININE 0.79 0.82   GLUCOSE 143* 150*   CALCIUM 9.1 8.9   PHOS 4.2  --      Lab Results   Component Value Date    .3 (H) 07/04/2023    CALCIUM 8.9 08/01/2023    CAION 1.22 07/30/2023    PHOS 4.2 07/31/2023     Lab Results   Component Value Date    IRON 35 (L) 12/30/2021    TIBC 284 12/30/2021    FERRITIN 24.99 12/30/2021       Medications:  Medication list was reviewed and changes noted under Assessment/Plan.    Diagnostic Results:    Reviewed most recent  CT/US/Echo/MRI    ASSESSMENT/PLAN:   1   ANTON / ATN  2   prerenal   improved  3   CKD 3-4  4   SHPT  5   hypernatremia   improved  6   paraplegia  7   A Fib   8   DM 2  9   respiratory failure , on vent  10  Right knee osteo  11  moderate protein calorie malnutrition     Plan   renal function improved and electrolyte normal and stable   I will juan diego off   please reconsult if I can help

## 2023-08-01 NOTE — NURSING
08/01/23 1100   Respiratory   Cough Type assisted   Suction   Suction Method tracheal;oral   Respiratory Secretions Assessment   Secretions Amount moderate   Secretions Color white   Secretions Characteristics thick   Oxygen Therapy   Device (Oxygen Therapy) ventilator   Oxygen Concentration (%) 30        Incision/Site 06/29/23 1103 Right Leg   Date First Assessed/Time First Assessed: 06/29/23 1103   Side: Right  Location: Leg   Wound Image   (camera issues- right knee vac)   Dressing Appearance Intact;Moist drainage   Drainage Amount Small   Drainage Characteristics/Odor Serous;No odor   Appearance Pink;White;Moist   Red (%), Wound Tissue Color 100 %   Periwound Area Dry;Intact   Wound Edges Irregular   Wound Length (cm) 2 cm   Wound Width (cm) 0.6 cm   Wound Depth (cm) 0.6 cm   Wound Volume (cm^3) 0.72 cm^3   Wound Surface Area (cm^2) 1.2 cm^2   Tunneling (depth (cm)/location) 12 noon 4cm   Care Cleansed with:;Antimicrobial agent   Dressing Changed        Negative Pressure Wound Therapy  06/29/23 1120 Right anterior   Placement Date/Time: 06/29/23 1120   Side: Right  Orientation: anterior  Location: Leg   NPWT Type Vacuum Therapy   Therapy Setting NPWT Continuous therapy   Pressure Setting NPWT 125 mmHg   Therapy Interventions NPWT Dressing changed   Sponges Inserted NPWT Black;White  (white sponge to maintain opening)   Sponges Removed NPWT Black;White        Altered Skin Integrity 06/28/23 2230 medial Sacral spine #1   Date First Assessed/Time First Assessed: 06/28/23 2230   Altered Skin Integrity Present on Admission - Did Patient arrive to the hospital with altered skin?: yes  Orientation: medial  Location: Sacral spine  Wound Number: #1  Is this injury device rel...   Wound Image    (granulated old scars)        Altered Skin Integrity 06/28/23 2230 Left posterior Heel #5   Date First Assessed/Time First Assessed: 06/28/23 2230   Altered Skin Integrity Present on Admission - Did Patient arrive to the  hospital with altered skin?: yes  Side: Left  Orientation: posterior  Location: Heel  Wound Number: #5  Is this injury dev...   Wound Image   (crusty dry)   Dressing Appearance Intact;Dry   Drainage Amount None   Appearance   (crusty dry)   Periwound Area Dry   Wound Length (cm) 3 cm   Wound Width (cm) 3 cm   Wound Surface Area (cm^2) 9 cm^2   Care Cleansed with:;Antimicrobial agent   Dressing Foam        Altered Skin Integrity 06/28/23 2230 Right lateral Ankle #6   Date First Assessed/Time First Assessed: 06/28/23 2230   Altered Skin Integrity Present on Admission - Did Patient arrive to the hospital with altered skin?: yes  Side: Right  Orientation: lateral  Location: Ankle  Wound Number: #6  Is this injury dev...   Description of Altered Skin Integrity Partial thickness tissue loss. Shallow open ulcer with a red or pink wound bed, without slough. Intact or Open/Ruptured Serum-filled blister.   Dressing Appearance Intact;Saturated   Drainage Amount Small   Drainage Characteristics/Odor Serosanguineous;No odor   Appearance Red;Moist;Bleeding   Tissue loss description Partial thickness   Red (%), Wound Tissue Color 100 %   Periwound Area Dry   Wound Edges Irregular   Wound Length (cm) 2.5 cm   Wound Width (cm) 2.5 cm   Wound Depth (cm) 0.1 cm   Wound Volume (cm^3) 0.625 cm^3   Wound Surface Area (cm^2) 6.25 cm^2   Care Cleansed with:;Antimicrobial agent   Dressing Gauze, wet to dry  (with vashe sol)   WOCN  follow-up    changed out pt wd vac to right knee.  Still using a darted white sponge to keep opening open to the the 12 o ck maximus.    Pt remains intubated and sedated.  Tolerated without discomfort noted.   Care concerns with nurse Prabhu.

## 2023-08-01 NOTE — PROGRESS NOTES
[facility] - 9th Floor Med Surg  Orthopedics  Progress Note    Patient Name: Bianca Khan  MRN: [id number]  Admission Date: 6/28/2023  Hospital Length of Stay: 34 days  Attending Provider: Franco Alfredo MD  Primary Care Provider: Areli Vargas PA-C  Follow-up For: Procedure(s) (LRB):  INCISION AND DRAINAGE, LOWER EXTREMITY (Right)      Subjective:     Principal Problem:Abscess of bursa of right knee    Principal Orthopedic Problem:  I&D right knee    Interval History: Patient with septic arthritis of the right knee s/p I&D. He is continuing IV abx therapy per ID. He is intubated in ICU at this time. Wound vac in place with adequate suction,appears recently changed. Canister is empty.     Review of patient's allergies indicates:   Allergen Reactions    Baclofen Itching and Anxiety       Current Facility-Administered Medications   Medication    0.9%  NaCl infusion (for blood administration)    acetaminophen tablet 650 mg    albuterol-ipratropium 2.5 mg-0.5 mg/3 mL nebulizer solution 3 mL    amLODIPine tablet 10 mg    atorvastatin tablet 20 mg    carvediloL tablet 25 mg    cefTRIAXone (ROCEPHIN) 2 g in dextrose 5 % in water (D5W) 5 % 100 mL IVPB (MB+)    clevidipine (CLEVIPREX) 25 mg/50 mL infusion    cloNIDine 0.2 mg/24 hr td ptwk 1 patch    dexmedetomidine (PRECEDEX) 400mcg/100mL 0.9% NaCL infusion    doxycycline tablet 100 mg    enoxaparin injection 30 mg    etomidate injection    famotidine (PF) injection 20 mg    fenofibrate tablet 48 mg    fentaNYL injection 50 mcg    fentaNYL injection    guaiFENesin 100 mg/5 ml syrup 400 mg    hydrALAZINE injection 10 mg    labetaloL injection 10 mg    miconazole NITRATE 2 % top powder    NORepinephrine 8 mg in dextrose 5% 250 mL infusion    oxybutynin tablet 5 mg    oxyCODONE-acetaminophen  mg per tablet 1 tablet    propofol (DIPRIVAN) 10 mg/mL infusion    rocuronium injection    senna-docusate 8.6-50 mg per tablet 2 tablet    sodium chloride  "0.9% bolus 250 mL 250 mL    sodium chloride 0.9% flush 10 mL    sodium chloride 3% nebulizer solution 4 mL    sodium citrate-citric acid 500-334 mg/5 ml solution 30 mL     Objective:     Vital Signs (Most Recent):  Temp: 98.9 °F (37.2 °C) (08/01/23 0400)  Pulse: 75 (08/01/23 0600)  Resp: 20 (08/01/23 0600)  BP: (!) 145/81 (08/01/23 0600)  SpO2: 100 % (08/01/23 0600) Vital Signs (24h Range):  Temp:  [98.1 °F (36.7 °C)-99.4 °F (37.4 °C)] 98.9 °F (37.2 °C)  Pulse:  [] 75  Resp:  [18-29] 20  SpO2:  [91 %-100 %] 100 %  BP: (114-167)/() 145/81     Weight: 86.2 kg (190 lb)  Height: 5' 9.69" (177 cm)  Body mass index is 27.51 kg/m².      Intake/Output Summary (Last 24 hours) at 8/1/2023 0728  Last data filed at 8/1/2023 0600  Gross per 24 hour   Intake 1301.04 ml   Output 1675 ml   Net -373.96 ml         Physical Exam:   Musculoskeletal:     Right lower extremity: WV in place CDI with adequate suction, compartments are soft and compressible; Tolerates gentle passive ROM at the right knee; mild tenderness to palpation; SILT distally; BCR distally        Diagnostic Findings:   Significant Labs:   Recent Lab Results         08/01/23  0137   07/31/23  0157   07/30/23  0523   07/30/23  0127        Albumin/Globulin Ratio 0.5   0.5           Albumin 2.4   2.5           Alkaline Phosphatase 81   102           Allens Test     N/A         ALT 19   16           Anion Gap       10.0       AST 22   19           Baso # 0.02   0.03     0.02       Basophil % 0.3   0.4     0.3       BILIRUBIN TOTAL 0.2   0.3           BUN 22.2   18.6     18.9       BUN/CREAT RATIO       25       Calcium 8.9   9.1     9.3       Ionized Calcium     1.22         Chloride 103   103     104       CO2 25   26     27       Creatinine 0.82   0.79     0.76       Drawn by     cw rrt         eGFR >60   >60     >60       Eos # 0.08   0.11     0.14       Eosinophil % 1.1   1.6     2.3       FIO2, Blood gas     30         Globulin, Total 4.7   4.9           " Glucose 150   143     140       Hematocrit 30.0   32.1     29.2       Hemoglobin 9.0   9.7     9.2       Immature Grans (Abs) 0.04   0.03     0.03       Immature Granulocytes 0.5   0.4     0.5       Lymph # 2.36   1.58     1.74       LYMPH % 31.1   23.1     28.8       Magnesium   2.10     1.40       MCH 24.2   24.3     24.6       MCHC 30.0   30.2     31.5       MCV 80.6   80.5     78.1       Mech Vt     450         MODE     AC         Mono # 0.71   0.57     0.46       Mono % 9.4   8.3     7.6       MPV 10.1   9.8     10.0       Neut # 4.37   4.51     3.65       Neut % 57.6   66.2     60.5       nRBC 0.0   0.0     0.0       PEEP     5.0         Phosphorus   4.2     4.7       Platelets 564   664     653       Base Excess, Blood gas     6.40         POC HCO3     31.3         POC PCO2     45.0         POC PH     7.450         POC PO2     56.0         Potassium, Blood Gas     3.3         Sodium, Blood Gas     138         Potassium 4.3   4.2     4.0       PROTEIN TOTAL 7.1   7.4           RBC 3.72   3.99     3.74       RDW 22.1   22.0     22.5       Mech RR     20         Sample site     Right Brachial Artery         Sample Type     Arterial Blood         sO2, Blood gas     90.0         Sodium 139   140     141       TOC2, Blood gas     32.7         WBC 7.58   6.83     6.04                Significant Imaging: I have reviewed and interpreted all pertinent imaging results/findings.     Assessment/Plan:     Active Diagnoses:    Diagnosis Date Noted POA    PRINCIPAL PROBLEM:  Abscess of bursa of right knee [M71.061] 06/28/2023 Yes      Problems Resolved During this Admission:     Continue Serial wound vac changes until healed. Will need wound care /WV management upon discharge  ID consult   Patient has poor prognosis overall. Will need to continue IV abx therapy per infectious disease  WBAT and ROMAT to the RLE.   Dr. Shen recommended amputation which the patient was not in agreement with.   No further surgery planned.  Continue wound vac changes every 2 days.   Please call with any questions or concerns.        The above findings, diagnostics, and treatment plan were discussed with Dr. Shen who is in agreement with the plan of care except as stated in additional documentation.      Ada Alexandra PA-C  Orthopedic Trauma Surgery  [facility]

## 2023-08-02 LAB
ALBUMIN SERPL-MCNC: 2.5 G/DL (ref 3.5–5)
ALBUMIN/GLOB SERPL: 0.5 RATIO (ref 1.1–2)
ALP SERPL-CCNC: 81 UNIT/L (ref 40–150)
ALT SERPL-CCNC: 17 UNIT/L (ref 0–55)
AST SERPL-CCNC: 18 UNIT/L (ref 5–34)
BASOPHILS # BLD AUTO: 0.03 X10(3)/MCL
BASOPHILS NFR BLD AUTO: 0.4 %
BILIRUBIN DIRECT+TOT PNL SERPL-MCNC: 0.2 MG/DL
BUN SERPL-MCNC: 26.2 MG/DL (ref 8.4–25.7)
CALCIUM SERPL-MCNC: 9.2 MG/DL (ref 8.4–10.2)
CHLORIDE SERPL-SCNC: 106 MMOL/L (ref 98–107)
CO2 SERPL-SCNC: 23 MMOL/L (ref 22–29)
CREAT SERPL-MCNC: 0.79 MG/DL (ref 0.73–1.18)
EOSINOPHIL # BLD AUTO: 0.12 X10(3)/MCL (ref 0–0.9)
EOSINOPHIL NFR BLD AUTO: 1.6 %
ERYTHROCYTE [DISTWIDTH] IN BLOOD BY AUTOMATED COUNT: 21.9 % (ref 11.5–17)
GFR SERPLBLD CREATININE-BSD FMLA CKD-EPI: >60 MLS/MIN/1.73/M2
GLOBULIN SER-MCNC: 4.8 GM/DL (ref 2.4–3.5)
GLUCOSE SERPL-MCNC: 163 MG/DL (ref 74–100)
HCT VFR BLD AUTO: 30.8 % (ref 42–52)
HGB BLD-MCNC: 9.1 G/DL (ref 14–18)
IMM GRANULOCYTES # BLD AUTO: 0.04 X10(3)/MCL (ref 0–0.04)
IMM GRANULOCYTES NFR BLD AUTO: 0.5 %
LYMPHOCYTES # BLD AUTO: 2.24 X10(3)/MCL (ref 0.6–4.6)
LYMPHOCYTES NFR BLD AUTO: 29.9 %
MCH RBC QN AUTO: 24.7 PG (ref 27–31)
MCHC RBC AUTO-ENTMCNC: 29.5 G/DL (ref 33–36)
MCV RBC AUTO: 83.7 FL (ref 80–94)
MONOCYTES # BLD AUTO: 0.63 X10(3)/MCL (ref 0.1–1.3)
MONOCYTES NFR BLD AUTO: 8.4 %
NEUTROPHILS # BLD AUTO: 4.43 X10(3)/MCL (ref 2.1–9.2)
NEUTROPHILS NFR BLD AUTO: 59.2 %
NRBC BLD AUTO-RTO: 0 %
PLATELET # BLD AUTO: 609 X10(3)/MCL (ref 130–400)
PMV BLD AUTO: 9.8 FL (ref 7.4–10.4)
POTASSIUM SERPL-SCNC: 4.2 MMOL/L (ref 3.5–5.1)
PROT SERPL-MCNC: 7.3 GM/DL (ref 6.4–8.3)
RBC # BLD AUTO: 3.68 X10(6)/MCL (ref 4.7–6.1)
SODIUM SERPL-SCNC: 140 MMOL/L (ref 136–145)
WBC # SPEC AUTO: 7.49 X10(3)/MCL (ref 4.5–11.5)

## 2023-08-02 PROCEDURE — 94640 AIRWAY INHALATION TREATMENT: CPT

## 2023-08-02 PROCEDURE — 25000003 PHARM REV CODE 250: Performed by: STUDENT IN AN ORGANIZED HEALTH CARE EDUCATION/TRAINING PROGRAM

## 2023-08-02 PROCEDURE — 63600175 PHARM REV CODE 636 W HCPCS: Performed by: INTERNAL MEDICINE

## 2023-08-02 PROCEDURE — 25000242 PHARM REV CODE 250 ALT 637 W/ HCPCS: Performed by: INTERNAL MEDICINE

## 2023-08-02 PROCEDURE — 80053 COMPREHEN METABOLIC PANEL: CPT | Performed by: STUDENT IN AN ORGANIZED HEALTH CARE EDUCATION/TRAINING PROGRAM

## 2023-08-02 PROCEDURE — 27100171 HC OXYGEN HIGH FLOW UP TO 24 HOURS

## 2023-08-02 PROCEDURE — 85025 COMPLETE CBC W/AUTO DIFF WBC: CPT | Performed by: STUDENT IN AN ORGANIZED HEALTH CARE EDUCATION/TRAINING PROGRAM

## 2023-08-02 PROCEDURE — 20000000 HC ICU ROOM

## 2023-08-02 PROCEDURE — 25000003 PHARM REV CODE 250

## 2023-08-02 PROCEDURE — 25000003 PHARM REV CODE 250: Performed by: INTERNAL MEDICINE

## 2023-08-02 PROCEDURE — 99900031 HC PATIENT EDUCATION (STAT)

## 2023-08-02 PROCEDURE — 99900026 HC AIRWAY MAINTENANCE (STAT)

## 2023-08-02 PROCEDURE — 27000221 HC OXYGEN, UP TO 24 HOURS

## 2023-08-02 PROCEDURE — 27200966 HC CLOSED SUCTION SYSTEM

## 2023-08-02 PROCEDURE — 99900035 HC TECH TIME PER 15 MIN (STAT)

## 2023-08-02 PROCEDURE — 94003 VENT MGMT INPAT SUBQ DAY: CPT

## 2023-08-02 PROCEDURE — 94761 N-INVAS EAR/PLS OXIMETRY MLT: CPT

## 2023-08-02 RX ADMIN — GUAIFENESIN 400 MG: 200 SOLUTION ORAL at 01:08

## 2023-08-02 RX ADMIN — DEXMEDETOMIDINE HYDROCHLORIDE 1 MCG/KG/HR: 400 INJECTION INTRAVENOUS at 03:08

## 2023-08-02 RX ADMIN — IPRATROPIUM BROMIDE AND ALBUTEROL SULFATE 3 ML: 2.5; .5 SOLUTION RESPIRATORY (INHALATION) at 08:08

## 2023-08-02 RX ADMIN — PROPOFOL 50 MCG/KG/MIN: 10 INJECTION, EMULSION INTRAVENOUS at 07:08

## 2023-08-02 RX ADMIN — PROPOFOL 50 MCG/KG/MIN: 10 INJECTION, EMULSION INTRAVENOUS at 10:08

## 2023-08-02 RX ADMIN — PROPOFOL 50 MCG/KG/MIN: 10 INJECTION, EMULSION INTRAVENOUS at 03:08

## 2023-08-02 RX ADMIN — PROPOFOL 50 MCG/KG/MIN: 10 INJECTION, EMULSION INTRAVENOUS at 06:08

## 2023-08-02 RX ADMIN — MICONAZOLE NITRATE 2 % TOPICAL POWDER: at 08:08

## 2023-08-02 RX ADMIN — DEXMEDETOMIDINE HYDROCHLORIDE 1 MCG/KG/HR: 400 INJECTION INTRAVENOUS at 04:08

## 2023-08-02 RX ADMIN — DOXYCYCLINE HYCLATE 100 MG: 100 TABLET, COATED ORAL at 08:08

## 2023-08-02 RX ADMIN — GUAIFENESIN 400 MG: 200 SOLUTION ORAL at 09:08

## 2023-08-02 RX ADMIN — PROPOFOL 50 MCG/KG/MIN: 10 INJECTION, EMULSION INTRAVENOUS at 04:08

## 2023-08-02 RX ADMIN — SODIUM CHLORIDE 30 MG/ML INHALATION SOLUTION 4 ML: 30 SOLUTION INHALANT at 08:08

## 2023-08-02 RX ADMIN — FAMOTIDINE 20 MG: 10 INJECTION, SOLUTION INTRAVENOUS at 09:08

## 2023-08-02 RX ADMIN — CARVEDILOL 25 MG: 12.5 TABLET, FILM COATED ORAL at 08:08

## 2023-08-02 RX ADMIN — GUAIFENESIN 400 MG: 200 SOLUTION ORAL at 05:08

## 2023-08-02 RX ADMIN — OXYBUTYNIN CHLORIDE 5 MG: 5 TABLET ORAL at 08:08

## 2023-08-02 RX ADMIN — CARVEDILOL 25 MG: 12.5 TABLET, FILM COATED ORAL at 09:08

## 2023-08-02 RX ADMIN — DOXYCYCLINE HYCLATE 100 MG: 100 TABLET, COATED ORAL at 09:08

## 2023-08-02 RX ADMIN — ATORVASTATIN CALCIUM 20 MG: 10 TABLET, FILM COATED ORAL at 08:08

## 2023-08-02 RX ADMIN — ENOXAPARIN SODIUM 30 MG: 30 INJECTION SUBCUTANEOUS at 09:08

## 2023-08-02 RX ADMIN — CEFTRIAXONE SODIUM 2 G: 2 INJECTION, POWDER, FOR SOLUTION INTRAMUSCULAR; INTRAVENOUS at 11:08

## 2023-08-02 RX ADMIN — AMLODIPINE BESYLATE 10 MG: 5 TABLET ORAL at 09:08

## 2023-08-02 RX ADMIN — GUAIFENESIN 400 MG: 200 SOLUTION ORAL at 10:08

## 2023-08-02 RX ADMIN — DEXMEDETOMIDINE HYDROCHLORIDE 1 MCG/KG/HR: 400 INJECTION INTRAVENOUS at 07:08

## 2023-08-02 RX ADMIN — OXYBUTYNIN CHLORIDE 5 MG: 5 TABLET ORAL at 09:08

## 2023-08-02 RX ADMIN — PROPOFOL 35 MCG/KG/MIN: 10 INJECTION, EMULSION INTRAVENOUS at 11:08

## 2023-08-02 RX ADMIN — MICONAZOLE NITRATE 2 % TOPICAL POWDER: at 09:08

## 2023-08-02 RX ADMIN — ENOXAPARIN SODIUM 30 MG: 30 INJECTION SUBCUTANEOUS at 08:08

## 2023-08-02 RX ADMIN — DEXMEDETOMIDINE HYDROCHLORIDE 1 MCG/KG/HR: 400 INJECTION INTRAVENOUS at 10:08

## 2023-08-02 RX ADMIN — SENNOSIDES AND DOCUSATE SODIUM 2 TABLET: 50; 8.6 TABLET ORAL at 09:08

## 2023-08-02 RX ADMIN — SENNOSIDES AND DOCUSATE SODIUM 2 TABLET: 50; 8.6 TABLET ORAL at 08:08

## 2023-08-02 RX ADMIN — FENOFIBRATE 48 MG: 48 TABLET, FILM COATED ORAL at 09:08

## 2023-08-02 NOTE — PROGRESS NOTES
[facility] - [facility]  Pulmonary Critical Care Note    Patient Name: Bianca Khan  MRN: [id number]  Admission Date: 6/28/2023  Hospital Length of Stay: 35 days  Code Status: Full Code  Attending Provider: Franco Alfredo MD  Primary Care Provider: Areli Vargas PA-C     Subjective:     HPI:     Bianca Khan is a 58 y.o. male with PMH of paraplegia 2/2 spinal cord injury (T1-T6), neurogenic bladder with suprapubic catheter, chronic right ankle osteomyelitis, DM II, HTN, who presented to the ED on 6/28/2023 with CC of right knee pain. Per chart review, CT of right knee revealed 5 cm area of subcutaneous fluid in prepatellar region which was aspirated in the ED; he was taken to the OR on 6/29/2023 by orthopedic surgery team and was found to have prepatellar bursa infection and septic arthritis. Wound culture 6/29/2023 positive for strep agalactiae. Orthopedic surgery team recommended right-sided above-knee amputation d/t chronically infected hardware in right lower extremity. On 7/4/2023, a RRT was called d/t acute respiratory distress that was not improving despite being placed on vapotherm at 35 L/min with FiO2 100%. At the time of examination, patient's initial GCS was 10 but progressively reduced to a GCS of 6. Shared decision with patient's wife was made to intubate patient for airway protection though ABGs were within normal limits. He was admitted to the ICU for close monitoring and further medical management.  The patient underwent incision and drainage of his right knee on 06/29.  He was admitted to the ICU and intubated for airway protection due to altered mental status on 7/4 with placement of hemodialysis catheter with initiation of hemodialysis.  Patient was subsequently extubated on 07/18 for the 2nd time.  He was reintubated on 07/21 secondary to worsening hypoxia and altered mental status with associated mucus plugging.  Status post bronchoscopy x3 since admission to the ICU  for hypoxia associated with mucus plugging.  He has been off of hemodialysis for several days now.    24 Hour Interval History:  Patient is still awaiting tracheostomy and PEG tube placement pending consent from his wife. Per report, she is back in town and plans to come to the hospital to discuss consent and procedure. FLORENCIO. Afebrile overnight and BP controlled off Cleviprex. Patient remains intubated and sedated on propofol 50 mcg and Precedex 1 mcg.       Past Medical History:   Diagnosis Date    Arthritis     Chronic ulcer of ankle 05/26/2022    Frequent UTI 07/02/2019    Generalized anxiety disorder 05/26/2022    Neurogenic bladder 05/26/2022    Osteomyelitis 05/26/2022    Presence of suprapubic catheter 05/26/2022    Pure hypercholesterolemia 05/26/2022    Retention of urine, unspecified 08/09/2019    Spinal cord injury at T1-T6 level 04/20/2018       Past Surgical History:   Procedure Laterality Date    INCISION AND DRAINAGE, LOWER EXTREMITY Right 6/29/2023    Procedure: INCISION AND DRAINAGE, LOWER EXTREMITY;  Surgeon: Prabhu Shen DO;  Location: [facility];  Service: Orthopedics;  Laterality: Right;  supine bone foam wash stuff cultures    INSERTION OF INTRAMEDULLARY MARISA Right 8/10/2022    Procedure: INSERTION, INTRAMEDULLARY MARISA RIGHT TIBIA;  Surgeon: Jorge Orellana MD;  Location: [facility];  Service: Orthopedics;  Laterality: Right;       Social History     Socioeconomic History    Marital status:    Tobacco Use    Smoking status: Every Day     Current packs/day: 0.00     Types: Cigars, Cigarettes    Smokeless tobacco: Never   Substance and Sexual Activity    Alcohol use: Yes     Alcohol/week: 2.0 standard drinks of alcohol     Types: 2 Cans of beer per week    Drug use: Not Currently    Sexual activity: Never       Current Outpatient Medications   Medication Instructions    amitriptyline (ELAVIL) 50 mg, Oral, Nightly    amLODIPine (NORVASC) 10 MG tablet 1 tablet    atorvastatin (LIPITOR) 20 mg,  Oral, Nightly    busPIRone (BUSPAR) 5 MG Tab 1 tablet    carvediloL (COREG) 12.5 mg, Oral    cyclobenzaprine (FLEXERIL) 10 mg, Oral, 2 times daily PRN    doxycycline (VIBRAMYCIN) 100 mg, Oral, Daily    fenofibrate 160 mg, Oral    FLUoxetine 20 mg, Oral    gabapentin (NEURONTIN) 300 MG capsule 1 capsule    lisinopriL (PRINIVIL,ZESTRIL) 10 mg, Oral, Nightly    lisinopriL 10 mg, Oral, Daily    LORazepam (ATIVAN) 1 mg, Oral, 2 times daily    melatonin (MELATIN) 3 mg tablet TAKE TWO TABLETS BY MOUTH EVERY NIGHT AT BEDTIME AS NEEDED FOR INSOMNIA.    meloxicam (MOBIC) 15 mg, Oral, Daily    metFORMIN (GLUCOPHAGE) 500 MG tablet SMARTSI Tablet(s) By Mouth Morning-Evening    oxybutynin (DITROPAN) 5 mg, Oral, 2 times daily    oxyCODONE-acetaminophen (PERCOCET)  mg per tablet 1 tablet, Oral, Every 6 hours PRN    pantoprazole (PROTONIX) 40 MG tablet 1 tablet    temazepam (RESTORIL) 30 mg, Oral, Nightly    traZODone (DESYREL) 50 mg, Oral, Nightly    XARELTO 20 mg Tab SMARTSI Tablet(s) By Mouth Every Evening       Current Inpatient Medications   albuterol-ipratropium  3 mL Nebulization BID    amLODIPine  10 mg Per NG tube Daily    atorvastatin  20 mg Per NG tube Nightly    carvediloL  25 mg Per NG tube BID    cefTRIAXone (ROCEPHIN) IVPB  2 g Intravenous Q24H    cloNIDine 0.2 mg/24 hr td ptwk  1 patch Transdermal Q7 Days    doxycycline  100 mg Per NG tube Q12H    enoxaparin  30 mg Subcutaneous Q12H    famotidine (PF)  20 mg Intravenous Daily    fenofibrate  48 mg Per NG tube Daily    guaiFENesin 100 mg/5 ml  400 mg Per NG tube Q4H    miconazole NITRATE 2 %   Topical (Top) BID    oxybutynin  5 mg Per NG tube BID    senna-docusate 8.6-50 mg  2 tablet Per NG tube BID    sodium chloride 3%  4 mL Nebulization BID    sodium citrate-citric acid 500-334 mg/5 ml  30 mL Oral Once     Current Intravenous Infusions   clevidipine Stopped (23)    dexmedeTOMIDine (Precedex) infusion (titrating) 1 mcg/kg/hr (23 7657)     NORepinephrine bitartrate-D5W Stopped (07/12/23 1039)    propofoL 50 mcg/kg/min (08/02/23 0407)       Objective:       Intake/Output Summary (Last 24 hours) at 8/2/2023 0419  Last data filed at 8/1/2023 2200  Gross per 24 hour   Intake 2484.35 ml   Output 2275 ml   Net 209.35 ml       Vital Signs (Most Recent):  Temp: 98 °F (36.7 °C) (08/02/23 0400)  Pulse: 65 (08/02/23 0400)  Resp: 20 (08/02/23 0400)  BP: 133/86 (08/02/23 0400)  SpO2: 98 % (08/02/23 0400)  Body mass index is 27.51 kg/m².  Weight: 86.2 kg (190 lb) Vital Signs (24h Range):  Temp:  [97.7 °F (36.5 °C)-99.4 °F (37.4 °C)] 98 °F (36.7 °C)  Pulse:  [60-88] 65  Resp:  [20-23] 20  SpO2:  [93 %-100 %] 98 %  BP: (111-172)/(68-96) 133/86     Physical Exam  General:   no acute respiratory distress  Head: Normocephalic, atraumatic  Eyes: PERRL, EOMI, anicteric sclera  Neck: supple  CVS:  RRR, S1 and S2 auscultated and 2+ peripheral pulses  Resp: CTAB on anterior chest wall  GI:  Abdomen soft, non-distended, normoactive bowel sounds. Suprapubic catheter in place.  MSK: Wound VAC right knee  Skin: No rashes, ulcers, erythema  Neuro: Cough, gag, cornea and pupillary reflex intact      Lines/Drains/Airways       Drain  Duration                  NG/OG Tube 07/21/23 1000 [place] sump 16 Fr. Left nostril 11 days         Suprapubic Catheter 07/31/23 1410 22 Fr. 1 day              Airway  Duration                  Airway - Non-Surgical 07/21/23 1655 11 days              Peripheral Intravenous Line  Duration                  Midline Catheter Insertion/Assessment  - Single Lumen 07/22/23 1603 Left basilic vein (medial side of arm) other (see comments) 10 days         Peripheral IV - Single Lumen 07/27/23 2145 20 G Posterior;Left Hand 5 days                  Significant Labs:    Lab Results   Component Value Date    WBC 7.49 08/02/2023    HGB 9.1 (L) 08/02/2023    HCT 30.8 (L) 08/02/2023    MCV 83.7 08/02/2023     (H) 08/02/2023     BMP  Lab Results   Component Value  Date     08/02/2023    K 4.2 08/02/2023    CHLORIDE 106 08/02/2023    CO2 23 08/02/2023    BUN 26.2 (H) 08/02/2023    CREATININE 0.79 08/02/2023    CALCIUM 9.2 08/02/2023    AGAP 10.0 07/30/2023    ESTGFRAFRICA 101 05/11/2022    EGFRNONAA >60 04/27/2022     ABG  Recent Labs   Lab 07/30/23 0523   PH 7.450   PO2 56.0*   HCO3 31.3       Mechanical Ventilation Support:  Vent Mode: A/C (08/02/23 0310)  Ventilator Initiated: Yes (07/21/23 1655)  Set Rate: 20 BPM (08/02/23 0310)  Vt Set: 450 mL (08/02/23 0310)  Pressure Support: 12 cmH20 (07/31/23 0736)  PEEP/CPAP: 5 cmH20 (08/02/23 0310)  Oxygen Concentration (%): 30 (08/02/23 0400)  Peak Airway Pressure: 20 cmH20 (08/02/23 0310)  Total Ve: 8.5 L/m (08/02/23 0310)  F/VT Ratio<105 (RSBI): (!) 47.39 (08/01/23 1700)    Significant Imaging:  I have reviewed the pertinent imaging within the past 24 hours.    Assessment/Plan:     Assessment  Septic arthritis of the right knee with Strep agalactiae s/p I&D on 06/29/2023 with wound VAC in place currently on doxycycline for suppressive therapy  Acute hypoxemic respiratory failure with associated pneumonia in addition to pulmonary edema on mechanical ventilation s/p intubation x3  ANTON with CKD 4 s/p multiple sessions HD currently not receiving hemodialysis   Hypoactive delirium   Paroxysmal a-fib with permanent pacemaker in place   Hx of paraplegia secondary to spinal cord injury at level T1 with associated neurogenic bladder and chronic right ankle osteomyelitis  DM II  HTN      Plan  - Titrate mechanical ventilation per ARDS net protocol, maintain SpO2 94-96%  - plans for tracheostomy and PEG tube placement pending consent from his wife, she plans to come today to discuss.   - Continue wound VAC per orthopedic surgery recommendations   - ID recommendations to continue ceftriaxone with end date of 08/09 and maintainance chronic suppressive doxycycline  - Continue Amlodipine, Coreg, and Clonidine for BP control.     DVT  Prophylaxis: Lovenox  GI Prophylaxis: Pepcid     32 minutes of critical care was time spent personally by me on the following activities: development of treatment plan with patient or surrogate and bedside caregivers, discussions with consultants, evaluation of patient's response to treatment, examination of patient, ordering and performing treatments and interventions, ordering and review of laboratory studies, ordering and review of radiographic studies, pulse oximetry, re-evaluation of patient's condition.  This critical care time did not overlap with that of any other provider or involve time for any procedures.     Carlos Quintana MD PGY-I  Pulmonary Critical Care Medicine  [facility] - [facility] ICU

## 2023-08-02 NOTE — NURSING
Nurses Note -- 4 Eyes      8/2/2023   5:44 PM      Skin assessed during: Daily Assessment      [] No Altered Skin Integrity Present    []Prevention Measures Documented      [x] Yes- Altered Skin Integrity Present or Discovered   [] LDA Added if Not in Epic (Describe Wound)   [x] New Altered Skin Integrity was Present on Admit and Documented in LDA   [] Wound Image Taken    Wound Care Consulted? Yes    Attending Nurse:  YOLIE Bangura     Second RN/Staff Member:   Nurse Neela Headley

## 2023-08-02 NOTE — PLAN OF CARE
Problem: Adult Inpatient Plan of Care  Goal: Absence of Hospital-Acquired Illness or Injury  Outcome: Ongoing, Progressing  Goal: Optimal Comfort and Wellbeing  Outcome: Ongoing, Progressing     Problem: Skin Injury Risk Increased  Goal: Skin Health and Integrity  Outcome: Ongoing, Progressing     Problem: Impaired Wound Healing  Goal: Optimal Wound Healing  Outcome: Ongoing, Progressing     Problem: Infection  Goal: Absence of Infection Signs and Symptoms  Outcome: Ongoing, Progressing     Problem: Communication Impairment (Mechanical Ventilation, Invasive)  Goal: Effective Communication  Outcome: Ongoing, Progressing     Problem: Inability to Wean (Mechanical Ventilation, Invasive)  Goal: Mechanical Ventilation Liberation  Outcome: Ongoing, Progressing     Problem: Communication Impairment (Artificial Airway)  Goal: Effective Communication  Outcome: Ongoing, Progressing     Problem: Skin and Tissue Injury (Artificial Airway)  Goal: Absence of Device-Related Skin or Tissue Injury  Outcome: Ongoing, Progressing

## 2023-08-02 NOTE — PROGRESS NOTES
Infectious Diseases Progress Note  58-year-old male with past medical history of T-spine cord injury with paraplegia, diabetes, HTN, hepatitis-C, chronic right ankle wound/osteomyelitis, was on suppressive doxycycline, known to my service, seen by us initially at [facility] and has followed with us at the office for chronic osteomyelitis, is admitted to [facility] on 06/28/2023 presenting through the ED with complaints of pain and swelling to his right knee, apparently struck his knee.  He did also report prior remote right knee surgery.  He was evaluated and noted to have no fevers initially but a temperature of 100.2° today 6/29, no leukocytosis but with thrombocytosis of up to 531 today.  .2 and ESR >130.  He is anemic with low albumin.  Blood cultures have been negative.  CT scan of the right knee noted a 5 cm area of subcutaneous fluid collection in the prepatellar region.  There was aspiration in the ER with findings of purulent fluid and cultures showing group B Streptococcus.  He was seen by the orthopedic surgery team with findings of right prepatellar bursa abscess and is status post incision and drainage of right knee septic arthritis and right prepatellar bursa abscess today 6/29 with group B Streptococcus  He is on ceftriaxone.    Subjective:  Remains in the ICU in no acute distress, orally intubated on ventilator.  No new complaints, low-grade temp noted, doing about the same.      Past Medical History:   Diagnosis Date    Arthritis     Chronic ulcer of ankle 05/26/2022    Frequent UTI 07/02/2019    Generalized anxiety disorder 05/26/2022    Neurogenic bladder 05/26/2022    Osteomyelitis 05/26/2022    Presence of suprapubic catheter 05/26/2022    Pure hypercholesterolemia 05/26/2022    Retention of urine, unspecified 08/09/2019    Spinal cord injury at T1-T6 level 04/20/2018     Past Surgical History:   Procedure Laterality Date    INCISION AND DRAINAGE,  LOWER EXTREMITY Right 6/29/2023    Procedure: INCISION AND DRAINAGE, LOWER EXTREMITY;  Surgeon: Prabhu Shen DO;  Location: [facility] OR;  Service: Orthopedics;  Laterality: Right;  supine bone foam wash stuff cultures    INSERTION OF INTRAMEDULLARY MARISA Right 8/10/2022    Procedure: INSERTION, INTRAMEDULLARY MARISA RIGHT TIBIA;  Surgeon: Jorge Orellana MD;  Location: [facility] OR;  Service: Orthopedics;  Laterality: Right;     Social History     Socioeconomic History    Marital status:    Tobacco Use    Smoking status: Every Day     Current packs/day: 0.00     Types: Cigars, Cigarettes    Smokeless tobacco: Never   Substance and Sexual Activity    Alcohol use: Yes     Alcohol/week: 2.0 standard drinks of alcohol     Types: 2 Cans of beer per week    Drug use: Not Currently    Sexual activity: Never       Review of Systems   Unable to perform ROS: Intubated       Review of patient's allergies indicates:   Allergen Reactions    Baclofen Itching and Anxiety         Scheduled Meds:   albuterol-ipratropium  3 mL Nebulization BID    amLODIPine  10 mg Per NG tube Daily    atorvastatin  20 mg Per NG tube Nightly    carvediloL  25 mg Per NG tube BID    cefTRIAXone (ROCEPHIN) IVPB  2 g Intravenous Q24H    cloNIDine 0.2 mg/24 hr td ptwk  1 patch Transdermal Q7 Days    doxycycline  100 mg Per NG tube Q12H    enoxaparin  30 mg Subcutaneous Q12H    famotidine (PF)  20 mg Intravenous Daily    fenofibrate  48 mg Per NG tube Daily    guaiFENesin 100 mg/5 ml  400 mg Per NG tube Q4H    miconazole NITRATE 2 %   Topical (Top) BID    oxybutynin  5 mg Per NG tube BID    senna-docusate 8.6-50 mg  2 tablet Per NG tube BID    sodium chloride 3%  4 mL Nebulization BID    sodium citrate-citric acid 500-334 mg/5 ml  30 mL Oral Once     Continuous Infusions:   clevidipine Stopped (07/31/23 2000)    dexmedeTOMIDine (Precedex) infusion (titrating) 1 mcg/kg/hr (08/01/23 8820)    NORepinephrine bitartrate-D5W Stopped (07/12/23 1039)    propofoL 50  "mcg/kg/min (23 8600)     PRN Meds:0.9%  NaCl infusion (for blood administration), acetaminophen, etomidate, [] fentaNYL **FOLLOWED BY** fentaNYL, fentaNYL, hydrALAZINE, labetalol, oxyCODONE-acetaminophen, rocuronium, sodium chloride 0.9%, sodium chloride 0.9%    Objective:  BP (!) 158/89   Pulse 84   Temp 98.3 °F (36.8 °C) (Oral)   Resp 20   Ht 5' 9.69" (1.77 m)   Wt 86.2 kg (190 lb)   SpO2 100%   BMI 27.51 kg/m²     Physical Exam:   Physical Exam  Vitals reviewed.   HENT:      Head: Normocephalic.   Cardiovascular:      Rate and Rhythm: Normal rate and regular rhythm.   Pulmonary:      Effort: Pulmonary effort is normal. No respiratory distress.      Breath sounds: B/L BS coarse. No wheezing.      Comments: On vent  Abdominal:      General: Bowel sounds are normal. There is no distension.     Palpations: Abdomen is soft.      Tenderness: There is no abdominal tenderness.   Genitourinary:     Comments: Suprapubic catheter  Musculoskeletal:      Cervical back: Normal range of motion.      Comments: Paraplegic   Skin:     Findings: No rash.      Comments: Wounds dressed. R knee with wound vac in place   Neurological:      Mental Status: Unable to fully assess, pt intubated and on vent  Psychiatric:         Behavior: Sedated        Imaging  Imaging Results              CT Knee W W/O Contrast Right (Final result)  Result time 23 18:37:42      Final result by Gustavo Cassidy MD (23 18:37:42)                   Impression:      Mildly limited assessment.  5 cm area of subcutaneous fluid in the prepatellar region may have thin peripheral enhancement.  Fluid collection is possible.    Subcutaneous edema in the calf.      Electronically signed by: Gustavo Cassidy  Date:    2023  Time:    18:37               Narrative:    EXAMINATION:  CT KNEE W W/O CONTRAST RIGHT    CLINICAL HISTORY:  possible infection;    TECHNIQUE:  CT imaging of the right knee before and after IV contrast.  Axial, " coronal and sagittal reformatted images reviewed.   Dose length product is 585 mGycm. Automatic exposure control, adjustment of mA/kV or iterative reconstruction technique used to limit radiation dose.    COMPARISON:  Radiograph 06/28/2023    FINDINGS:  Assessment mildly limited by motion.  Joint alignments are maintained with degenerative changes at the knee.  Fixation hardware in the lower femur and tibia with remote proximal fibular fracture.  Areas of heterotopic ossification along the lower portion of the femur.  Small knee effusion.  Areas of subcutaneous edema anteriorly below the knee.  More focal area of fluid in the subcutaneous tissues of the prepatellar region measures up to 5 cm in transverse diameter and 5 cm in length.  There is image noise from the hardware, but this fluid may have thin areas of peripheral enhancement.  No tracking subcutaneous gas.                                       X-Ray Tibia Fibula 2 View Right (Final result)  Result time 06/28/23 18:14:06      Final result by Tracy Zamudio MD (06/28/23 18:14:06)                   Impression:      Posttraumatic and postsurgical changes at the femur and lower leg.  There is chronic deformity at the distal femur with heterogeneous sclerosis and lucency superimposed on background bony demineralization.  No old imaging of this region available for comparison.  This makes it difficult to evaluate for acute superimposed lytic change.    Postsurgical and posttraumatic change at the tibia and fibula with bony demineralization.  No appreciable acute osseous abnormality.      Electronically signed by: Tracy Zamudio  Date:    06/28/2023  Time:    18:14               Narrative:    EXAMINATION:  XR FEMUR 2 VIEW RIGHT; XR TIBIA FIBULA 2 VIEW RIGHT    CLINICAL HISTORY:  possible infection;; infection;    TECHNIQUE:  AP and lateral views of the right femur were performed.    AP and lateral views of the right tibia and fibula were  obtained.    COMPARISON:  Knee radiographs 06/28/2023, tibia and fibular radiographs 08/10/2022    FINDINGS:  There are postsurgical changes of ORIF at the distal femur with lateral plate and screws.  There are postsurgical changes of ORIF at the tibia with antegrade intramedullary raven and proximal and distal interlocking screws.  Hardware appears intact.  No acute fracture seen.  There are old, healed fracture deformities.    There is chronic remodeling at the distal femur with heterogeneous appearance of the marrow and cortex distally.  There are areas of lucency as well as sclerosis.  There is no imaging through the distal femur available for comparison to evaluate for acute lytic changes superimposed on chronic background posttraumatic and postsurgical change.  Older prior radiographs of the tibia and fibula do not adequately imaged the area.  The bones are demineralized.    There is soft tissue swelling at the knee.  There is soft tissue swelling at the ankle.                                       X-Ray Femur 2 View Right (Final result)  Result time 06/28/23 18:14:06      Final result by Tracy Zamudio MD (06/28/23 18:14:06)                   Impression:      Posttraumatic and postsurgical changes at the femur and lower leg.  There is chronic deformity at the distal femur with heterogeneous sclerosis and lucency superimposed on background bony demineralization.  No old imaging of this region available for comparison.  This makes it difficult to evaluate for acute superimposed lytic change.    Postsurgical and posttraumatic change at the tibia and fibula with bony demineralization.  No appreciable acute osseous abnormality.      Electronically signed by: Tracy Zamudio  Date:    06/28/2023  Time:    18:14               Narrative:    EXAMINATION:  XR FEMUR 2 VIEW RIGHT; XR TIBIA FIBULA 2 VIEW RIGHT    CLINICAL HISTORY:  possible infection;; infection;    TECHNIQUE:  AP and lateral views of the right femur  were performed.    AP and lateral views of the right tibia and fibula were obtained.    COMPARISON:  Knee radiographs 06/28/2023, tibia and fibular radiographs 08/10/2022    FINDINGS:  There are postsurgical changes of ORIF at the distal femur with lateral plate and screws.  There are postsurgical changes of ORIF at the tibia with antegrade intramedullary raven and proximal and distal interlocking screws.  Hardware appears intact.  No acute fracture seen.  There are old, healed fracture deformities.    There is chronic remodeling at the distal femur with heterogeneous appearance of the marrow and cortex distally.  There are areas of lucency as well as sclerosis.  There is no imaging through the distal femur available for comparison to evaluate for acute lytic changes superimposed on chronic background posttraumatic and postsurgical change.  Older prior radiographs of the tibia and fibula do not adequately imaged the area.  The bones are demineralized.    There is soft tissue swelling at the knee.  There is soft tissue swelling at the ankle.                                       X-Ray Knee 3 View Right (Final result)  Result time 06/28/23 14:18:03      Final result by Lanette Waller MD (06/28/23 14:18:03)                   Impression:      1. No acute bony abnormality.  2. Soft tissue swelling around the knee.      Electronically signed by: Lanette Waller  Date:    06/28/2023  Time:    14:18               Narrative:    EXAMINATION:  XR KNEE 3 VIEW RIGHT    CLINICAL HISTORY:  Effusion, right knee    COMPARISON:  None.    FINDINGS:  There has been prior internal fixation of the femur and tibia.  There are chronic changes of the bones with heterotopic ossification around the knee.  There is no acute fracture identified.  There is soft tissue swelling around the knee.                                       Lab Review   Recent Results (from the past 24 hour(s))   CBC with Differential    Collection Time: 08/01/23   1:37 AM   Result Value Ref Range    WBC 7.58 4.50 - 11.50 x10(3)/mcL    RBC 3.72 (L) 4.70 - 6.10 x10(6)/mcL    Hgb 9.0 (L) 14.0 - 18.0 g/dL    Hct 30.0 (L) 42.0 - 52.0 %    MCV 80.6 80.0 - 94.0 fL    MCH 24.2 (L) 27.0 - 31.0 pg    MCHC 30.0 (L) 33.0 - 36.0 g/dL    RDW 22.1 (H) 11.5 - 17.0 %    Platelet 564 (H) 130 - 400 x10(3)/mcL    MPV 10.1 7.4 - 10.4 fL    Neut % 57.6 %    Lymph % 31.1 %    Mono % 9.4 %    Eos % 1.1 %    Basophil % 0.3 %    Lymph # 2.36 0.6 - 4.6 x10(3)/mcL    Neut # 4.37 2.1 - 9.2 x10(3)/mcL    Mono # 0.71 0.1 - 1.3 x10(3)/mcL    Eos # 0.08 0 - 0.9 x10(3)/mcL    Baso # 0.02 <=0.2 x10(3)/mcL    IG# 0.04 0 - 0.04 x10(3)/mcL    IG% 0.5 %    NRBC% 0.0 %   Comprehensive Metabolic Panel    Collection Time: 08/01/23  1:37 AM   Result Value Ref Range    Sodium Level 139 136 - 145 mmol/L    Potassium Level 4.3 3.5 - 5.1 mmol/L    Chloride 103 98 - 107 mmol/L    Carbon Dioxide 25 22 - 29 mmol/L    Glucose Level 150 (H) 74 - 100 mg/dL    Blood Urea Nitrogen 22.2 8.4 - 25.7 mg/dL    Creatinine 0.82 0.73 - 1.18 mg/dL    Calcium Level Total 8.9 8.4 - 10.2 mg/dL    Protein Total 7.1 6.4 - 8.3 gm/dL    Albumin Level 2.4 (L) 3.5 - 5.0 g/dL    Globulin 4.7 (H) 2.4 - 3.5 gm/dL    Albumin/Globulin Ratio 0.5 (L) 1.1 - 2.0 ratio    Bilirubin Total 0.2 <=1.5 mg/dL    Alkaline Phosphatase 81 40 - 150 unit/L    Alanine Aminotransferase 19 0 - 55 unit/L    Aspartate Aminotransferase 22 5 - 34 unit/L    eGFR >60 mls/min/1.73/m2             Assessment/Plan:  1. SIRS with fevers  2. Group B Streptococcus Right knee prepatellar abscess  3. Group B Streptococcus Right knee septic arthritis  4.  Anemia/reactive thrombocytosis  5.  Paraplegia secondary to T-spine cord injury  6. History of hepatitis-C   7. Chronic right ankle wound/osteomyelitis  8.  Diabetes    9.  Acute kidney injury  10. Acute hypoxic respiratory failure  11. Pulmonary edema with pleural effusions/ Pneumonitis      -Continue ceftriaxone with end date of  08/09 and keep on maintenance chronic suppressive doxycycline indefinitely  -Low-grade temp noted without leukocytosis, thrombocytosis trending down, follow  -Stool for c-diff PCR and toxin (-)  -7/4 and 7/8 blood cultures negative and sputum culture with moderate yeast.    -7/4 CT scan of the abdomen and pelvis and 7/3 chest x-ray results noted, likely dealing with pulmonary edema with pleural effusions and possibly superimposed infectious pneumonitis.   -6/28 right knee abscess culture with Group B Streptococcus  -Seen by Orthopedic surgery team s/p I&D of R prepatellar bursa abscess and septic R knee with cultures yielding Group G Streptococcus  -6/28 blood cultures negative  -Multiple comorbidities, paraplegic with chronic pressure wounds, right ankle osteomyelitis with exposed bone on chronic suppressive doxycycline  -We will continue surgical site care per Orthopedic surgery team  -We will continue wound care  -He is to continue management of his hepatitis-C as outpatient  -Renal impairment noted, HD per Nephrology, started 7/4  -7/3 Renal ultrasound results noted  -Re-intubated on 7/21 followed by Bronchoscopy with mucus plugging noted  -Plan for PEG and tracheostomy placement on hold pending obtaining consent from wife  -Continue vent management and ICU support per Intensivist  -Discussed with nursing staff.

## 2023-08-03 LAB
ALBUMIN SERPL-MCNC: 2.5 G/DL (ref 3.5–5)
ALBUMIN/GLOB SERPL: 0.5 RATIO (ref 1.1–2)
ALP SERPL-CCNC: 77 UNIT/L (ref 40–150)
ALT SERPL-CCNC: 15 UNIT/L (ref 0–55)
AST SERPL-CCNC: 15 UNIT/L (ref 5–34)
BASOPHILS # BLD AUTO: 0.03 X10(3)/MCL
BASOPHILS NFR BLD AUTO: 0.3 %
BILIRUBIN DIRECT+TOT PNL SERPL-MCNC: 0.2 MG/DL
BUN SERPL-MCNC: 29.3 MG/DL (ref 8.4–25.7)
CALCIUM SERPL-MCNC: 9.2 MG/DL (ref 8.4–10.2)
CHLORIDE SERPL-SCNC: 105 MMOL/L (ref 98–107)
CO2 SERPL-SCNC: 25 MMOL/L (ref 22–29)
CREAT SERPL-MCNC: 0.77 MG/DL (ref 0.73–1.18)
EOSINOPHIL # BLD AUTO: 0.13 X10(3)/MCL (ref 0–0.9)
EOSINOPHIL NFR BLD AUTO: 1.4 %
ERYTHROCYTE [DISTWIDTH] IN BLOOD BY AUTOMATED COUNT: 21.7 % (ref 11.5–17)
GFR SERPLBLD CREATININE-BSD FMLA CKD-EPI: >60 MLS/MIN/1.73/M2
GLOBULIN SER-MCNC: 5.1 GM/DL (ref 2.4–3.5)
GLUCOSE SERPL-MCNC: 140 MG/DL (ref 74–100)
HCT VFR BLD AUTO: 30.3 % (ref 42–52)
HGB BLD-MCNC: 9.2 G/DL (ref 14–18)
IMM GRANULOCYTES # BLD AUTO: 0.06 X10(3)/MCL (ref 0–0.04)
IMM GRANULOCYTES NFR BLD AUTO: 0.7 %
LYMPHOCYTES # BLD AUTO: 2.65 X10(3)/MCL (ref 0.6–4.6)
LYMPHOCYTES NFR BLD AUTO: 29.2 %
MCH RBC QN AUTO: 25.1 PG (ref 27–31)
MCHC RBC AUTO-ENTMCNC: 30.4 G/DL (ref 33–36)
MCV RBC AUTO: 82.8 FL (ref 80–94)
MONOCYTES # BLD AUTO: 0.83 X10(3)/MCL (ref 0.1–1.3)
MONOCYTES NFR BLD AUTO: 9.2 %
NEUTROPHILS # BLD AUTO: 5.37 X10(3)/MCL (ref 2.1–9.2)
NEUTROPHILS NFR BLD AUTO: 59.2 %
NRBC BLD AUTO-RTO: 0 %
PLATELET # BLD AUTO: 649 X10(3)/MCL (ref 130–400)
PMV BLD AUTO: 10 FL (ref 7.4–10.4)
POTASSIUM SERPL-SCNC: 4.2 MMOL/L (ref 3.5–5.1)
PROT SERPL-MCNC: 7.6 GM/DL (ref 6.4–8.3)
RBC # BLD AUTO: 3.66 X10(6)/MCL (ref 4.7–6.1)
SODIUM SERPL-SCNC: 139 MMOL/L (ref 136–145)
WBC # SPEC AUTO: 9.07 X10(3)/MCL (ref 4.5–11.5)

## 2023-08-03 PROCEDURE — 94640 AIRWAY INHALATION TREATMENT: CPT

## 2023-08-03 PROCEDURE — 63600175 PHARM REV CODE 636 W HCPCS

## 2023-08-03 PROCEDURE — 99900025 HC BRONCHOSCOPY-ASST (STAT)

## 2023-08-03 PROCEDURE — 27200966 HC CLOSED SUCTION SYSTEM

## 2023-08-03 PROCEDURE — 97605 NEG PRS WND THER DME<=50SQCM: CPT

## 2023-08-03 PROCEDURE — 63600175 PHARM REV CODE 636 W HCPCS: Performed by: INTERNAL MEDICINE

## 2023-08-03 PROCEDURE — 94003 VENT MGMT INPAT SUBQ DAY: CPT

## 2023-08-03 PROCEDURE — 25000242 PHARM REV CODE 250 ALT 637 W/ HCPCS: Performed by: INTERNAL MEDICINE

## 2023-08-03 PROCEDURE — 99900026 HC AIRWAY MAINTENANCE (STAT)

## 2023-08-03 PROCEDURE — 25000003 PHARM REV CODE 250

## 2023-08-03 PROCEDURE — 27100171 HC OXYGEN HIGH FLOW UP TO 24 HOURS

## 2023-08-03 PROCEDURE — 25000003 PHARM REV CODE 250: Performed by: INTERNAL MEDICINE

## 2023-08-03 PROCEDURE — 94761 N-INVAS EAR/PLS OXIMETRY MLT: CPT

## 2023-08-03 PROCEDURE — 99900035 HC TECH TIME PER 15 MIN (STAT)

## 2023-08-03 PROCEDURE — 27202055 HC BRONCHOSCOPE, DISP

## 2023-08-03 PROCEDURE — 27000221 HC OXYGEN, UP TO 24 HOURS

## 2023-08-03 PROCEDURE — 25000003 PHARM REV CODE 250: Performed by: STUDENT IN AN ORGANIZED HEALTH CARE EDUCATION/TRAINING PROGRAM

## 2023-08-03 PROCEDURE — 25000003 PHARM REV CODE 250: Performed by: NURSE PRACTITIONER

## 2023-08-03 PROCEDURE — 85025 COMPLETE CBC W/AUTO DIFF WBC: CPT | Performed by: STUDENT IN AN ORGANIZED HEALTH CARE EDUCATION/TRAINING PROGRAM

## 2023-08-03 PROCEDURE — 20000000 HC ICU ROOM

## 2023-08-03 PROCEDURE — 80053 COMPREHEN METABOLIC PANEL: CPT | Performed by: STUDENT IN AN ORGANIZED HEALTH CARE EDUCATION/TRAINING PROGRAM

## 2023-08-03 RX ADMIN — FAMOTIDINE 20 MG: 10 INJECTION, SOLUTION INTRAVENOUS at 08:08

## 2023-08-03 RX ADMIN — DEXMEDETOMIDINE HYDROCHLORIDE 1 MCG/KG/HR: 400 INJECTION INTRAVENOUS at 04:08

## 2023-08-03 RX ADMIN — CARVEDILOL 25 MG: 12.5 TABLET, FILM COATED ORAL at 08:08

## 2023-08-03 RX ADMIN — PROPOFOL 50 MCG/KG/MIN: 10 INJECTION, EMULSION INTRAVENOUS at 06:08

## 2023-08-03 RX ADMIN — GUAIFENESIN 400 MG: 200 SOLUTION ORAL at 02:08

## 2023-08-03 RX ADMIN — ATORVASTATIN CALCIUM 20 MG: 10 TABLET, FILM COATED ORAL at 08:08

## 2023-08-03 RX ADMIN — DEXMEDETOMIDINE HYDROCHLORIDE 1.4 MCG/KG/HR: 400 INJECTION INTRAVENOUS at 02:08

## 2023-08-03 RX ADMIN — DEXMEDETOMIDINE HYDROCHLORIDE 1.2 MCG/KG/HR: 400 INJECTION INTRAVENOUS at 09:08

## 2023-08-03 RX ADMIN — GUAIFENESIN 400 MG: 200 SOLUTION ORAL at 05:08

## 2023-08-03 RX ADMIN — ENOXAPARIN SODIUM 30 MG: 30 INJECTION SUBCUTANEOUS at 08:08

## 2023-08-03 RX ADMIN — PROPOFOL 50 MCG/KG/MIN: 10 INJECTION, EMULSION INTRAVENOUS at 08:08

## 2023-08-03 RX ADMIN — OXYBUTYNIN CHLORIDE 5 MG: 5 TABLET ORAL at 08:08

## 2023-08-03 RX ADMIN — SENNOSIDES AND DOCUSATE SODIUM 2 TABLET: 50; 8.6 TABLET ORAL at 08:08

## 2023-08-03 RX ADMIN — SODIUM CHLORIDE 30 MG/ML INHALATION SOLUTION 4 ML: 30 SOLUTION INHALANT at 07:08

## 2023-08-03 RX ADMIN — IPRATROPIUM BROMIDE AND ALBUTEROL SULFATE 3 ML: 2.5; .5 SOLUTION RESPIRATORY (INHALATION) at 07:08

## 2023-08-03 RX ADMIN — PROPOFOL 50 MCG/KG/MIN: 10 INJECTION, EMULSION INTRAVENOUS at 11:08

## 2023-08-03 RX ADMIN — CLONIDINE 1 PATCH: 0.2 PATCH TRANSDERMAL at 08:08

## 2023-08-03 RX ADMIN — AMLODIPINE BESYLATE 10 MG: 5 TABLET ORAL at 08:08

## 2023-08-03 RX ADMIN — DOXYCYCLINE HYCLATE 100 MG: 100 TABLET, COATED ORAL at 08:08

## 2023-08-03 RX ADMIN — DEXMEDETOMIDINE HYDROCHLORIDE 1.4 MCG/KG/HR: 400 INJECTION INTRAVENOUS at 06:08

## 2023-08-03 RX ADMIN — MICONAZOLE NITRATE 2 % TOPICAL POWDER: at 08:08

## 2023-08-03 RX ADMIN — DEXMEDETOMIDINE HYDROCHLORIDE 1.4 MCG/KG/HR: 400 INJECTION INTRAVENOUS at 08:08

## 2023-08-03 RX ADMIN — GUAIFENESIN 400 MG: 200 SOLUTION ORAL at 10:08

## 2023-08-03 RX ADMIN — FENOFIBRATE 48 MG: 48 TABLET, FILM COATED ORAL at 08:08

## 2023-08-03 RX ADMIN — CEFTRIAXONE SODIUM 2 G: 2 INJECTION, POWDER, FOR SOLUTION INTRAMUSCULAR; INTRAVENOUS at 11:08

## 2023-08-03 RX ADMIN — HYDRALAZINE HYDROCHLORIDE 10 MG: 20 INJECTION INTRAMUSCULAR; INTRAVENOUS at 10:08

## 2023-08-03 RX ADMIN — PROPOFOL 45 MCG/KG/MIN: 10 INJECTION, EMULSION INTRAVENOUS at 12:08

## 2023-08-03 RX ADMIN — PROPOFOL 40 MCG/KG/MIN: 10 INJECTION, EMULSION INTRAVENOUS at 09:08

## 2023-08-03 RX ADMIN — DEXMEDETOMIDINE HYDROCHLORIDE 1.4 MCG/KG/HR: 400 INJECTION INTRAVENOUS at 12:08

## 2023-08-03 RX ADMIN — PROPOFOL 50 MCG/KG/MIN: 10 INJECTION, EMULSION INTRAVENOUS at 02:08

## 2023-08-03 NOTE — NURSING
08/03/23 0900        Incision/Site 06/29/23 1103 Right Leg   Date First Assessed/Time First Assessed: 06/29/23 1103   Side: Right  Location: Leg   Wound Image    Dressing Appearance Intact;Moist drainage   Drainage Amount Small   Drainage Characteristics/Odor Serosanguineous;No odor   Appearance Pink;Maroon;Moist   Red (%), Wound Tissue Color 100 %   Periwound Area Dry   Wound Edges Irregular   Wound Length (cm) 2 cm   Wound Width (cm) 0.6 cm   Wound Depth (cm) 0.6 cm   Wound Volume (cm^3) 0.72 cm^3   Wound Surface Area (cm^2) 1.2 cm^2   Tunneling (depth (cm)/location) 12 o ck 4cm   Care Cleansed with:;Sterile normal saline   Dressing Changed        Negative Pressure Wound Therapy  06/29/23 1120 Right anterior   Placement Date/Time: 06/29/23 1120   Side: Right  Orientation: anterior  Location: Leg   NPWT Type Vacuum Therapy   Therapy Setting NPWT Continuous therapy   Pressure Setting NPWT 125 mmHg   Sponges Inserted NPWT Black;White;1   Sponges Removed NPWT Black;White;1     WOCN  follow-up changed out wd vac sponges to right knee abcess.  Pt remains intubated and sedated.  Spoke with nurse velarde who relays awaiting md for bronchoscopy.

## 2023-08-03 NOTE — PROGRESS NOTES
[facility] - [facility] ICU  Pulmonary Critical Care Note    Patient Name: Bianca Khan  MRN: [id number]  Admission Date: 6/28/2023  Hospital Length of Stay: 36 days  Code Status: Full Code  Attending Provider: Franco Alfredo MD  Primary Care Provider: Areli Vargas PA-C     Subjective:     HPI:   Bianca Khan is a 58 y.o. male with PMH of paraplegia 2/2 spinal cord injury (T1-T6), neurogenic bladder with suprapubic catheter, chronic right ankle osteomyelitis, DM II, HTN, who presented to the ED on 6/28/2023 with CC of right knee pain. Per chart review, CT of right knee revealed 5 cm area of subcutaneous fluid in prepatellar region which was aspirated in the ED; he was taken to the OR on 6/29/2023 by orthopedic surgery team and was found to have prepatellar bursa infection and septic arthritis. Wound culture 6/29/2023 positive for strep agalactiae. Orthopedic surgery team recommended right-sided above-knee amputation d/t chronically infected hardware in right lower extremity. On 7/4/2023, a RRT was called d/t acute respiratory distress that was not improving despite being placed on vapotherm at 35 L/min with FiO2 100%. At the time of examination, patient's initial GCS was 10 but progressively reduced to a GCS of 6. Shared decision with patient's wife was made to intubate patient for airway protection though ABGs were within normal limits. He was admitted to the ICU for close monitoring and further medical management.  The patient underwent incision and drainage of his right knee on 06/29.  He was admitted to the ICU and intubated for airway protection due to altered mental status on 7/4 with placement of hemodialysis catheter with initiation of hemodialysis.  Patient was subsequently extubated on 07/18 for the 2nd time.  He was reintubated on 07/21 secondary to worsening hypoxia and altered mental status with associated mucus plugging.  Status post bronchoscopy x3 since admission to the  ICU for hypoxia associated with mucus plugging.  He has been off of hemodialysis for several days now.      24 Hour Interval History:  Still pending tracheostomy and PEG tube placement.  Discussed with wife yesterday, she is currently back in town and available to sign paperwork.  Remains afebrile, no significant leukocytosis.  Renal function stable, approximately 3.5 L urine output documented yesterday.  Ventilator settings slightly worse overnight, oxygen saturations 93-94% on 55% FiO2.      ROS unobtainable 2/2 intubation, sedation.       Past Medical History:   Diagnosis Date    Arthritis     Chronic ulcer of ankle 05/26/2022    Frequent UTI 07/02/2019    Generalized anxiety disorder 05/26/2022    Neurogenic bladder 05/26/2022    Osteomyelitis 05/26/2022    Presence of suprapubic catheter 05/26/2022    Pure hypercholesterolemia 05/26/2022    Retention of urine, unspecified 08/09/2019    Spinal cord injury at T1-T6 level 04/20/2018     Past Surgical History:   Procedure Laterality Date    INCISION AND DRAINAGE, LOWER EXTREMITY Right 6/29/2023    Procedure: INCISION AND DRAINAGE, LOWER EXTREMITY;  Surgeon: Prabhu Shen DO;  Location: [facility];  Service: Orthopedics;  Laterality: Right;  supine bone foam wash stuff cultures    INSERTION OF INTRAMEDULLARY MARISA Right 8/10/2022    Procedure: INSERTION, INTRAMEDULLARY MARISA RIGHT TIBIA;  Surgeon: Jorge Orellana MD;  Location: [facility];  Service: Orthopedics;  Laterality: Right;     Social History     Socioeconomic History    Marital status:    Tobacco Use    Smoking status: Every Day     Current packs/day: 0.00     Types: Cigars, Cigarettes    Smokeless tobacco: Never   Substance and Sexual Activity    Alcohol use: Yes     Alcohol/week: 2.0 standard drinks of alcohol     Types: 2 Cans of beer per week    Drug use: Not Currently    Sexual activity: Never     Current Outpatient Medications   Medication Instructions    amitriptyline (ELAVIL) 50 mg, Oral, Nightly     amLODIPine (NORVASC) 10 MG tablet 1 tablet    atorvastatin (LIPITOR) 20 mg, Oral, Nightly    busPIRone (BUSPAR) 5 MG Tab 1 tablet    carvediloL (COREG) 12.5 mg, Oral    cyclobenzaprine (FLEXERIL) 10 mg, Oral, 2 times daily PRN    doxycycline (VIBRAMYCIN) 100 mg, Oral, Daily    fenofibrate 160 mg, Oral    FLUoxetine 20 mg, Oral    gabapentin (NEURONTIN) 300 MG capsule 1 capsule    lisinopriL (PRINIVIL,ZESTRIL) 10 mg, Oral, Nightly    lisinopriL 10 mg, Oral, Daily    LORazepam (ATIVAN) 1 mg, Oral, 2 times daily    melatonin (MELATIN) 3 mg tablet TAKE TWO TABLETS BY MOUTH EVERY NIGHT AT BEDTIME AS NEEDED FOR INSOMNIA.    meloxicam (MOBIC) 15 mg, Oral, Daily    metFORMIN (GLUCOPHAGE) 500 MG tablet SMARTSI Tablet(s) By Mouth Morning-Evening    oxybutynin (DITROPAN) 5 mg, Oral, 2 times daily    oxyCODONE-acetaminophen (PERCOCET)  mg per tablet 1 tablet, Oral, Every 6 hours PRN    pantoprazole (PROTONIX) 40 MG tablet 1 tablet    temazepam (RESTORIL) 30 mg, Oral, Nightly    traZODone (DESYREL) 50 mg, Oral, Nightly    XARELTO 20 mg Tab SMARTSI Tablet(s) By Mouth Every Evening     Current Inpatient Medications   albuterol-ipratropium  3 mL Nebulization BID    amLODIPine  10 mg Per NG tube Daily    atorvastatin  20 mg Per NG tube Nightly    carvediloL  25 mg Per NG tube BID    cefTRIAXone (ROCEPHIN) IVPB  2 g Intravenous Q24H    cloNIDine 0.2 mg/24 hr td ptwk  1 patch Transdermal Q7 Days    doxycycline  100 mg Per NG tube Q12H    enoxaparin  30 mg Subcutaneous Q12H    famotidine (PF)  20 mg Intravenous Daily    fenofibrate  48 mg Per NG tube Daily    guaiFENesin 100 mg/5 ml  400 mg Per NG tube Q4H    miconazole NITRATE 2 %   Topical (Top) BID    oxybutynin  5 mg Per NG tube BID    senna-docusate 8.6-50 mg  2 tablet Per NG tube BID    sodium chloride 3%  4 mL Nebulization BID    sodium citrate-citric acid 500-334 mg/5 ml  30 mL Oral Once     Current Intravenous Infusions   clevidipine Stopped (23)     dexmedeTOMIDine (Precedex) infusion (titrating) 1 mcg/kg/hr (08/03/23 0453)    NORepinephrine bitartrate-D5W Stopped (07/12/23 1039)    propofoL 45 mcg/kg/min (08/03/23 0052)       Objective:       Intake/Output Summary (Last 24 hours) at 8/3/2023 0642  Last data filed at 8/3/2023 0600  Gross per 24 hour   Intake 2679.6 ml   Output 3400 ml   Net -720.4 ml       Vital Signs (Most Recent):  Temp: 97.9 °F (36.6 °C) (08/03/23 0400)  Pulse: 99 (08/03/23 0615)  Resp: 14 (08/03/23 0615)  BP: (!) 158/87 (08/03/23 0615)  SpO2: (!) 91 % (08/03/23 0615)  Body mass index is 27.51 kg/m².  Weight: 86.2 kg (190 lb) Vital Signs (24h Range):  Temp:  [97.7 °F (36.5 °C)-99 °F (37.2 °C)] 97.9 °F (36.6 °C)  Pulse:  [65-99] 99  Resp:  [14-29] 14  SpO2:  [90 %-100 %] 91 %  BP: (133-165)/(73-93) 158/87         Physical Exam  Gen: intubated and sedated but rouses to voice  HENT- ATNC, MMM  CV- RRR  Resp- CTAB, saturations 93% on 55% FiO2  MSK- WWP, no edema  Neuro- sedated on propofol and precedex but rouses to voice  Psych- appropriate mood and affect         Lines/Drains/Airways       Drain  Duration                  NG/OG Tube 07/21/23 1000 [place] sump 16 Fr. Left nostril 12 days         Suprapubic Catheter 07/31/23 1410 22 Fr. 2 days              Airway  Duration                  Airway - Non-Surgical 07/21/23 1655 12 days              Peripheral Intravenous Line  Duration                  Midline Catheter Insertion/Assessment  - Single Lumen 07/22/23 1603 Left basilic vein (medial side of arm) other (see comments) 11 days         Peripheral IV - Single Lumen 07/27/23 2145 20 G Posterior;Left Hand 6 days                  Significant Labs:    Lab Results   Component Value Date    WBC 9.07 08/03/2023    HGB 9.2 (L) 08/03/2023    HCT 30.3 (L) 08/03/2023    MCV 82.8 08/03/2023     (H) 08/03/2023     BMP  Lab Results   Component Value Date     08/03/2023    K 4.2 08/03/2023    CHLORIDE 105 08/03/2023    CO2 25 08/03/2023    BUN  29.3 (H) 08/03/2023    CREATININE 0.77 08/03/2023    CALCIUM 9.2 08/03/2023    AGAP 10.0 07/30/2023    ESTGFRAFRICA 101 05/11/2022    EGFRNONAA >60 04/27/2022     ABG  Recent Labs   Lab 07/30/23 0523   PH 7.450   PO2 56.0*   HCO3 31.3       Mechanical Ventilation Support:  Vent Mode: A/C (08/03/23 0450)  Ventilator Initiated: Yes (07/21/23 1655)  Set Rate: 20 BPM (08/03/23 0450)  Vt Set: 450 mL (08/03/23 0450)  Pressure Support: 12 cmH20 (08/02/23 1645)  PEEP/CPAP: 5 cmH20 (08/03/23 0450)  Oxygen Concentration (%): 50 (08/03/23 0450)  Peak Airway Pressure: 19 cmH20 (08/03/23 0450)  Total Ve: 9.2 L/m (08/03/23 0450)  F/VT Ratio<105 (RSBI): (!) 45.87 (08/03/23 0450)      Assessment/Plan:     Assessment  Septic arthritis of the right knee with Strep agalactiae s/p I&D on 06/29/2023 with wound VAC in place currently on doxycycline for suppressive therapy  Acute hypoxemic respiratory failure with associated pneumonia in addition to pulmonary edema on mechanical ventilation s/p intubation x3  ANTON with CKD 4 s/p multiple sessions HD currently not receiving hemodialysis   Hypoactive delirium   Paroxysmal a-fib with permanent pacemaker in place   Hx of paraplegia secondary to spinal cord injury at level T1 with associated neurogenic bladder and chronic right ankle osteomyelitis  DM II  HTN      Plan  -failure wean from mechanical ventilation due to altered mental status, generalized debility, poor secretion clearance-> tracheostomy and PEG tube placement pending  -wife planning to come to bedside today to discuss and signed consent with anesthesia, will touch base with surgical services   -continue wound VAC per orthopedic surgery recommendations, they have recommended amputation this hospital stay but patient has refused  -infectious disease service following, on ceftriaxone with tentative end date 08/09/2023, with chronic suppressive doxycycline to continue indefinitely  -worsened hypoxia noted this morning, but chest  sounds overall clear to auscultation on exam, chest x-ray ordered and pending        DVT Prophylaxis: Lovenox  GI Prophylaxis: Pepcid         Franco Alfredo MD  Pulmonary Disease and Critical Care Medicine  [facility] - [facility] ICU

## 2023-08-03 NOTE — NURSING
Nurses Note -- 4 Eyes      8/3/2023   3:55 PM      Skin assessed during: Daily Assessment      [] No Altered Skin Integrity Present    []Prevention Measures Documented      [x] Yes- Altered Skin Integrity Present or Discovered   [] LDA Added if Not in Epic (Describe Wound)   [] New Altered Skin Integrity was Present on Admit and Documented in LDA   [] Wound Image Taken    Wound Care Consulted? Yes    Attending Nurse:  Doris Bernabe RN/Staff Member:   Diamond Angeles RN

## 2023-08-03 NOTE — NURSING
Nurses Note -- 4 Eyes      8/3/2023   6:33 AM      Skin assessed during: Daily Assessment      [] No Altered Skin Integrity Present    []Prevention Measures Documented      [x] Yes- Altered Skin Integrity Present or Discovered   [] LDA Added if Not in Epic (Describe Wound)   [] New Altered Skin Integrity was Present on Admit and Documented in LDA   [] Wound Image Taken    Wound Care Consulted? Yes    Attending Nurse:  Trinh Bernabe RN/Staff Member:  [email or web address]

## 2023-08-03 NOTE — PROCEDURES
[facility]  Bronchoscopy Procedure Note    SUMMARY     Date of Procedure: 8/3/2023    Procedure: Bronchoscopy, Therapeutic    Performed by: Franco Alfredo MD    Pre-Procedure Diagnosis:  Mucus plugging    Post-Procedure Diagnosis:  Same    Anesthesia:  Deep sedation in ICU        Description of the Findings of the Procedure:     Procedure was performed emergently due to mucus plugging in the intensive care unit.  I attempted to call patient's wife to obtain telephone consent, but she did not answer the telephone.  The bronchocope was inserted into the main airway via the endotracheal tube. An anatomical survey was done of the main airways and the subsegmental bronchus.      There were significant thick secretions identified within the left mainstem bronchus extending to the level of the marija.  These were easily suctioned and further mucus plugging was identified in all visualized bronchopulmonary segments.  The entire tracheobronchial tree on the left side was clean and free of secretions at the completion of the procedure.  The right tracheobronchial tree was examined and found to be free of significant secretions.  The patient tolerated the procedure well.  There were no complications.      Complications: None; patient tolerated the procedure well.    Estimated Blood Loss (EBL): Minimal           Specimens: None       Condition: Stable        Disposition: ICU - intubated and hemodynamically stable.    Franco Alfredo MD  [facility]

## 2023-08-03 NOTE — PROGRESS NOTES
Inpatient Nutrition Assessment    Admit Date: 6/28/2023   Total duration of encounter: 36 days     Nutrition Recommendation/Prescription     Tube feeding recommendation:   Peptamen Intense VHP goal rate 60 ml/hr to provide  1200 kcal/d (62% est needs, 98% with meds)  111 g protein/d (85% est needs)  1008 ml free water/d   (calculations based on estimated 20 hr/d run time)     Continue with Malachi (provides 90 kcal, 2.5 g protein per serving) BID.    Communication of Recommendations: reviewed with nurse    Nutrition Assessment     Malnutrition Assessment/Nutrition-Focused Physical Exam    Malnutrition Context: chronic illness  Malnutrition Level:  (does not meet criteria)  Energy Intake (Malnutrition):  (unable to obtain)  Weight Loss (Malnutrition):  (unable to obtain)  Subcutaneous Fat (Malnutrition):  (does not meet criteria)           Muscle Mass (Malnutrition):  (does not meet criteria)                          Fluid Accumulation (Malnutrition):  (does not meet criteria)        A minimum of two characteristics is recommended for diagnosis of either severe or non-severe malnutrition.    Chart Review    Reason Seen: continuous nutrition monitoring and follow-up    Malnutrition Screening Tool Results   Have you recently lost weight without trying?: Unsure  Have you been eating poorly because of a decreased appetite?: Yes   MST Score: 3     Diagnosis:  Bilateral Pulmonary Infiltrates   Uremic encephalopathy 2/2 acute renal failure  Acute renal failure  Hyponatremia  Septic arthritis    Relevant Medical History: paraplegia 2/2 spinal cord injury (T1-T6), neurogenic bladder with suprapubic catheter, chronic right ankle osteomyelitis, DM II, HTN    Nutrition-Related Medications: diprivan, senna-docusate    Calorie Containing IV Medications: Diprivan @ 26 ml/hr (provides 685 kcal/d)    Nutrition-Related Labs:  7/5 Na 128, K 5.3, Glu 93, BUN 39.8, Crea 7.07, Phos 7.1, GFR 8  7/11 Na 133, BUN 75.8, Crea 4.3, Glu 121, Phos  6.5  7/14 .2, Crea 4.09, Glu 118, Phos 5.9  7/18 BUN 59.5, Crea 2.34, Glu 119, Phos 5.3  7/20 Na 148, BUN 71.2, Crea 2.4, Glu 146  7/24 BUN 86, Crea 2.17, Glu 152, Phos 5.2  7/26 Cl 111, Glu 147  7/31 Glu 143  8/3 BUN 29.3, Glu 140    Diet/PN Order: Diet NPO Except for: Sips with Medication  Oral Supplement Order: none  Tube Feeding Order:  Impact Peptide 1.5 (see below for calculation)  Appetite/Oral Intake: not applicable/not applicable  Factors Affecting Nutritional Intake: respiratory status  Food/Tenriism/Cultural Preferences: unable to obtain  Food Allergies: none reported    Skin Integrity: wound, incision  Wound(s):      Altered Skin Integrity 06/28/23 2230 medial Sacral spine #1-Tissue loss description: Partial thickness       Altered Skin Integrity 06/28/23 2230 Right posterior Buttocks #2-Tissue loss description: Partial thickness       Altered Skin Integrity 06/28/23 2230 Left posterior Buttocks #4-Tissue loss description: Not applicable       Altered Skin Integrity 06/28/23 2230 Right lateral Ankle #6-Tissue loss description: Partial thickness     Comments    7/5/23: Discussed with RN. Will provide tube feeding recommendations for when appropriate to start tube feeding. Receiving kcal from meds. Noted K and Phos, will need renal formula at this time. Will add supplemental protein and Malachi for wound healing.     7/11/23: TF continues, tolerated @ goal rate.     7/14/23: TF continues, tolerated per RN.     7/18/23: TF continues, tolerated per RN. Possible plans for vent weaning today. Still receiving kcal from meds. Noted increase in diprivan. If remains at increased rate, will adjust TF rate to not overfeed.    7/20/23: Tf no longer running. NG pulled out by pt. Now on BIPAP and not able to replace tube. Discussed with RN, can wait a few days, but will eventually need to consider reinserting NG vs. PN if not able to insert NG.     7/24/23: Pt now reintubated. NG in place. TF running. Now  "overfeeding pt and Phos elevated. Will continue with Malachi but change to renal formula. Plans for trach this week per RN. Noted updated TF recs will not meet est protein needs. Will continue at this time and hopefully once trach placed can increase TF rate since diprivan hopefully weaned.    23: TF on hold for trach and PEG today. Noted now not placed per notes. Can continue with previous TF. Once placed, will update TF to continue to meet est needs.    23: TF continues, tolerated per RN. Noted plans for trach/PEG later this week. Receiving kcal from meds.     8/3/23: TF on hold for bronch this AM. Still plans for trach/PEG, not yet placed. Still receiving kcal from meds.      Anthropometrics    Height: 5' 9.69" (177 cm) Height Method: Stated  Last Weight: 86.2 kg (190 lb) (23 1114) Weight Method: Standard Scale (stated)  BMI (Calculated): 27.5  BMI Classification: overweight (BMI 25-29.9)        Ideal Body Weight (IBW), Male: 164.14 lb     % Ideal Body Weight, Male (lb): 115.75 %                          Usual Weight Provided By: unable to obtain usual weight    Wt Readings from Last 5 Encounters:   23 86.2 kg (190 lb)   22 86.2 kg (190 lb)   22 86.2 kg (190 lb 0.6 oz)   22 86.2 kg (189 lb 15.9 oz)     Weight Change(s) Since Admission:  Admit Weight: 86.2 kg (190 lb) (23 1346)  23: no new  23: no new  23: no new  23: no new  8/3/23: no new    Estimated Needs    Weight Used For Calorie Calculations: 86.2 kg (190 lb 0.6 oz)  Energy Calorie Requirements (kcal): 1922kcal  Energy Need Method: [facility]  Weight Used For Protein Calculations: 86.2 kg (190 lb 0.6 oz)  Protein Requirements: 130gm (1.5g/kg)  Fluid Requirements (mL): 1000ml + urinary output  Temp (24hrs), Av.4 °F (36.9 °C), Min:97.7 °F (36.5 °C), Max:99 °F (37.2 °C)    Vtot (L/Min) for [place] Equation Calculation: 9.3    Enteral Nutrition    Formula: Peptamen Intense VHP  Rate/Volume: " 60ml/hr  Water Flushes: 50ml/hr q4hr  Additives/Modulars: Malachi BID  Route: nasogastric tube  Method: continuous  Total Nutrition Provided by Tube Feeding, Additives, and Flushes:  Calories Provided  1200 kcal/d, 62% needs   Protein Provided  111 g/d, 85% needs   Fluid Provided  1008 ml/d, N/A% needs   Continuous feeding calculations based on estimated 20 hr/d run time unless otherwise stated.     Parenteral Nutrition    Patient not receiving parenteral nutrition support at this time.    Evaluation of Received Nutrient Intake    Calories: not meeting estimated needs  Protein: not meeting estimated needs    Patient Education    Not applicable.    Nutrition Diagnosis     PES: Inadequate oral intake related to acute illness as evidenced by NPO post extubation/reintubation. (continues)    Interventions/Goals     Intervention(s): collaboration with other providers  Goal: Meet greater than 75% of nutritional needs by follow-up. (goal progressing)    Monitoring & Evaluation     Dietitian will monitor energy intake.  Nutrition Risk/Follow-Up: high (follow-up in 1-4 days)   Please consult if re-assessment needed sooner.

## 2023-08-04 LAB
ALBUMIN SERPL-MCNC: 2.2 G/DL (ref 3.5–5)
ALBUMIN/GLOB SERPL: 0.5 RATIO (ref 1.1–2)
ALP SERPL-CCNC: 71 UNIT/L (ref 40–150)
ALT SERPL-CCNC: 13 UNIT/L (ref 0–55)
ANION GAP SERPL CALC-SCNC: 10 MEQ/L
AST SERPL-CCNC: 13 UNIT/L (ref 5–34)
BASOPHILS # BLD AUTO: 0.03 X10(3)/MCL
BASOPHILS NFR BLD AUTO: 0.3 %
BILIRUBIN DIRECT+TOT PNL SERPL-MCNC: 0.3 MG/DL
BUN SERPL-MCNC: 22.7 MG/DL (ref 8.4–25.7)
BUN SERPL-MCNC: 25.5 MG/DL (ref 8.4–25.7)
CALCIUM SERPL-MCNC: 8.8 MG/DL (ref 8.4–10.2)
CALCIUM SERPL-MCNC: 9.3 MG/DL (ref 8.4–10.2)
CHLORIDE SERPL-SCNC: 105 MMOL/L (ref 98–107)
CHLORIDE SERPL-SCNC: 105 MMOL/L (ref 98–107)
CO2 SERPL-SCNC: 25 MMOL/L (ref 22–29)
CO2 SERPL-SCNC: 25 MMOL/L (ref 22–29)
CREAT SERPL-MCNC: 0.77 MG/DL (ref 0.73–1.18)
CREAT SERPL-MCNC: 0.8 MG/DL (ref 0.73–1.18)
CREAT/UREA NIT SERPL: 29
EOSINOPHIL # BLD AUTO: 0.02 X10(3)/MCL (ref 0–0.9)
EOSINOPHIL NFR BLD AUTO: 0.2 %
ERYTHROCYTE [DISTWIDTH] IN BLOOD BY AUTOMATED COUNT: 21.4 % (ref 11.5–17)
GFR SERPLBLD CREATININE-BSD FMLA CKD-EPI: >60 MLS/MIN/1.73/M2
GFR SERPLBLD CREATININE-BSD FMLA CKD-EPI: >60 MLS/MIN/1.73/M2
GLOBULIN SER-MCNC: 4.8 GM/DL (ref 2.4–3.5)
GLUCOSE SERPL-MCNC: 144 MG/DL (ref 74–100)
GLUCOSE SERPL-MCNC: 154 MG/DL (ref 74–100)
HCT VFR BLD AUTO: 27.9 % (ref 42–52)
HGB BLD-MCNC: 8.5 G/DL (ref 14–18)
IMM GRANULOCYTES # BLD AUTO: 0.05 X10(3)/MCL (ref 0–0.04)
IMM GRANULOCYTES NFR BLD AUTO: 0.4 %
LYMPHOCYTES # BLD AUTO: 2.21 X10(3)/MCL (ref 0.6–4.6)
LYMPHOCYTES NFR BLD AUTO: 19 %
MAGNESIUM SERPL-MCNC: 1.4 MG/DL (ref 1.6–2.6)
MAGNESIUM SERPL-MCNC: 1.4 MG/DL (ref 1.6–2.6)
MCH RBC QN AUTO: 24.5 PG (ref 27–31)
MCHC RBC AUTO-ENTMCNC: 30.5 G/DL (ref 33–36)
MCV RBC AUTO: 80.4 FL (ref 80–94)
MONOCYTES # BLD AUTO: 0.71 X10(3)/MCL (ref 0.1–1.3)
MONOCYTES NFR BLD AUTO: 6.1 %
NEUTROPHILS # BLD AUTO: 8.6 X10(3)/MCL (ref 2.1–9.2)
NEUTROPHILS NFR BLD AUTO: 74 %
NRBC BLD AUTO-RTO: 0 %
PHOSPHATE SERPL-MCNC: 2.8 MG/DL (ref 2.3–4.7)
PLATELET # BLD AUTO: 591 X10(3)/MCL (ref 130–400)
PMV BLD AUTO: 10.2 FL (ref 7.4–10.4)
POTASSIUM SERPL-SCNC: 3.9 MMOL/L (ref 3.5–5.1)
POTASSIUM SERPL-SCNC: 4.3 MMOL/L (ref 3.5–5.1)
PROT SERPL-MCNC: 7 GM/DL (ref 6.4–8.3)
RBC # BLD AUTO: 3.47 X10(6)/MCL (ref 4.7–6.1)
SODIUM SERPL-SCNC: 139 MMOL/L (ref 136–145)
SODIUM SERPL-SCNC: 140 MMOL/L (ref 136–145)
TROPONIN I SERPL-MCNC: 0.02 NG/ML (ref 0–0.04)
WBC # SPEC AUTO: 11.62 X10(3)/MCL (ref 4.5–11.5)

## 2023-08-04 PROCEDURE — 94761 N-INVAS EAR/PLS OXIMETRY MLT: CPT

## 2023-08-04 PROCEDURE — 99900026 HC AIRWAY MAINTENANCE (STAT)

## 2023-08-04 PROCEDURE — 93005 ELECTROCARDIOGRAM TRACING: CPT

## 2023-08-04 PROCEDURE — 84100 ASSAY OF PHOSPHORUS: CPT

## 2023-08-04 PROCEDURE — 25000003 PHARM REV CODE 250: Performed by: INTERNAL MEDICINE

## 2023-08-04 PROCEDURE — 83735 ASSAY OF MAGNESIUM: CPT | Performed by: INTERNAL MEDICINE

## 2023-08-04 PROCEDURE — 99900035 HC TECH TIME PER 15 MIN (STAT)

## 2023-08-04 PROCEDURE — 27200966 HC CLOSED SUCTION SYSTEM

## 2023-08-04 PROCEDURE — 25000003 PHARM REV CODE 250: Performed by: STUDENT IN AN ORGANIZED HEALTH CARE EDUCATION/TRAINING PROGRAM

## 2023-08-04 PROCEDURE — 25000242 PHARM REV CODE 250 ALT 637 W/ HCPCS: Performed by: INTERNAL MEDICINE

## 2023-08-04 PROCEDURE — 80053 COMPREHEN METABOLIC PANEL: CPT | Performed by: STUDENT IN AN ORGANIZED HEALTH CARE EDUCATION/TRAINING PROGRAM

## 2023-08-04 PROCEDURE — 83735 ASSAY OF MAGNESIUM: CPT

## 2023-08-04 PROCEDURE — 25000003 PHARM REV CODE 250

## 2023-08-04 PROCEDURE — 27100171 HC OXYGEN HIGH FLOW UP TO 24 HOURS

## 2023-08-04 PROCEDURE — 27000221 HC OXYGEN, UP TO 24 HOURS

## 2023-08-04 PROCEDURE — 85025 COMPLETE CBC W/AUTO DIFF WBC: CPT | Performed by: STUDENT IN AN ORGANIZED HEALTH CARE EDUCATION/TRAINING PROGRAM

## 2023-08-04 PROCEDURE — 63600175 PHARM REV CODE 636 W HCPCS: Performed by: INTERNAL MEDICINE

## 2023-08-04 PROCEDURE — 94003 VENT MGMT INPAT SUBQ DAY: CPT

## 2023-08-04 PROCEDURE — 94640 AIRWAY INHALATION TREATMENT: CPT

## 2023-08-04 PROCEDURE — 84484 ASSAY OF TROPONIN QUANT: CPT

## 2023-08-04 PROCEDURE — 20000000 HC ICU ROOM

## 2023-08-04 RX ADMIN — MICONAZOLE NITRATE 2 % TOPICAL POWDER: at 08:08

## 2023-08-04 RX ADMIN — GUAIFENESIN 400 MG: 200 SOLUTION ORAL at 05:08

## 2023-08-04 RX ADMIN — DEXMEDETOMIDINE HYDROCHLORIDE 1.4 MCG/KG/HR: 400 INJECTION INTRAVENOUS at 05:08

## 2023-08-04 RX ADMIN — PROPOFOL 50 MCG/KG/MIN: 10 INJECTION, EMULSION INTRAVENOUS at 01:08

## 2023-08-04 RX ADMIN — DEXMEDETOMIDINE HYDROCHLORIDE 1.4 MCG/KG/HR: 400 INJECTION INTRAVENOUS at 08:08

## 2023-08-04 RX ADMIN — OXYBUTYNIN CHLORIDE 5 MG: 5 TABLET ORAL at 08:08

## 2023-08-04 RX ADMIN — PROPOFOL 50 MCG/KG/MIN: 10 INJECTION, EMULSION INTRAVENOUS at 11:08

## 2023-08-04 RX ADMIN — AMLODIPINE BESYLATE 10 MG: 5 TABLET ORAL at 08:08

## 2023-08-04 RX ADMIN — FENOFIBRATE 48 MG: 48 TABLET, FILM COATED ORAL at 08:08

## 2023-08-04 RX ADMIN — ATORVASTATIN CALCIUM 20 MG: 10 TABLET, FILM COATED ORAL at 08:08

## 2023-08-04 RX ADMIN — ENOXAPARIN SODIUM 30 MG: 30 INJECTION SUBCUTANEOUS at 08:08

## 2023-08-04 RX ADMIN — DOXYCYCLINE HYCLATE 100 MG: 100 TABLET, COATED ORAL at 08:08

## 2023-08-04 RX ADMIN — PROPOFOL 50 MCG/KG/MIN: 10 INJECTION, EMULSION INTRAVENOUS at 05:08

## 2023-08-04 RX ADMIN — DEXMEDETOMIDINE HYDROCHLORIDE 1.4 MCG/KG/HR: 400 INJECTION INTRAVENOUS at 11:08

## 2023-08-04 RX ADMIN — SODIUM CHLORIDE 30 MG/ML INHALATION SOLUTION 4 ML: 30 SOLUTION INHALANT at 08:08

## 2023-08-04 RX ADMIN — PROPOFOL 50 MCG/KG/MIN: 10 INJECTION, EMULSION INTRAVENOUS at 03:08

## 2023-08-04 RX ADMIN — GUAIFENESIN 400 MG: 200 SOLUTION ORAL at 01:08

## 2023-08-04 RX ADMIN — SENNOSIDES AND DOCUSATE SODIUM 2 TABLET: 50; 8.6 TABLET ORAL at 08:08

## 2023-08-04 RX ADMIN — GUAIFENESIN 400 MG: 200 SOLUTION ORAL at 10:08

## 2023-08-04 RX ADMIN — IPRATROPIUM BROMIDE AND ALBUTEROL SULFATE 3 ML: 2.5; .5 SOLUTION RESPIRATORY (INHALATION) at 08:08

## 2023-08-04 RX ADMIN — FAMOTIDINE 20 MG: 10 INJECTION, SOLUTION INTRAVENOUS at 08:08

## 2023-08-04 RX ADMIN — DEXMEDETOMIDINE HYDROCHLORIDE 1.4 MCG/KG/HR: 400 INJECTION INTRAVENOUS at 09:08

## 2023-08-04 RX ADMIN — DEXMEDETOMIDINE HYDROCHLORIDE 1.4 MCG/KG/HR: 400 INJECTION INTRAVENOUS at 01:08

## 2023-08-04 RX ADMIN — CARVEDILOL 25 MG: 12.5 TABLET, FILM COATED ORAL at 08:08

## 2023-08-04 RX ADMIN — DEXMEDETOMIDINE HYDROCHLORIDE 1.4 MCG/KG/HR: 400 INJECTION INTRAVENOUS at 03:08

## 2023-08-04 RX ADMIN — GUAIFENESIN 400 MG: 200 SOLUTION ORAL at 02:08

## 2023-08-04 RX ADMIN — GUAIFENESIN 400 MG: 200 SOLUTION ORAL at 09:08

## 2023-08-04 RX ADMIN — PROPOFOL 50 MCG/KG/MIN: 10 INJECTION, EMULSION INTRAVENOUS at 08:08

## 2023-08-04 NOTE — PROGRESS NOTES
[facility] - [facility]  Pulmonary Critical Care Note    Patient Name: Bianca Khan  MRN: [id number]  Admission Date: 6/28/2023  Hospital Length of Stay: 37 days  Code Status: Full Code  Attending Provider: Franco Alfredo MD  Primary Care Provider: Areli Vargas PA-C     Subjective:     HPI:   Bianca Khan is a 58 y.o. male with PMH of paraplegia 2/2 spinal cord injury (T1-T6), neurogenic bladder with suprapubic catheter, chronic right ankle osteomyelitis, DM II, HTN, who presented to the ED on 6/28/2023 with CC of right knee pain. Per chart review, CT of right knee revealed 5 cm area of subcutaneous fluid in prepatellar region which was aspirated in the ED; he was taken to the OR on 6/29/2023 by orthopedic surgery team and was found to have prepatellar bursa infection and septic arthritis. Wound culture 6/29/2023 positive for strep agalactiae. Orthopedic surgery team recommended right-sided above-knee amputation d/t chronically infected hardware in right lower extremity. On 7/4/2023, a RRT was called d/t acute respiratory distress that was not improving despite being placed on vapotherm at 35 L/min with FiO2 100%. At the time of examination, patient's initial GCS was 10 but progressively reduced to a GCS of 6. Shared decision with patient's wife was made to intubate patient for airway protection though ABGs were within normal limits. He was admitted to the ICU for close monitoring and further medical management.  The patient underwent incision and drainage of his right knee on 06/29.  He was admitted to the ICU and intubated for airway protection due to altered mental status on 7/4 with placement of hemodialysis catheter with initiation of hemodialysis.  Patient was subsequently extubated on 07/18 for the 2nd time.  He was reintubated on 07/21 secondary to worsening hypoxia and altered mental status with associated mucus plugging.  Status post bronchoscopy x3 since admission to the  ICU for hypoxia associated with mucus plugging.  He has been off of hemodialysis for several days now.      24 Hour Interval History:  Still pending tracheostomy and PEG tube placement.Per report, there was some discussion about signing consent but unsure if it was obtained over the phone or not. No plans for trach and peg today. He did have a bronchoscopy done and thick secretions from the left mainstem bronchus were suctioned. Patient remains afebrile, however elevated WBC noted at 11.62. Renal function stable with urine output of 1030 ml since yesterday. He is sedated with Precedex at 1.4 mcg/kg and Propofol at 50 mcg/kg. Ventilator settings were weaned down to 45% FiO2 with saturation of %. Chest x -ray revealed almost complete opacification of the left hemithorax likely related to worsening left-sided pleural effusion.      ROS unobtainable 2/2 intubation, sedation.       Past Medical History:   Diagnosis Date    Arthritis     Chronic ulcer of ankle 05/26/2022    Frequent UTI 07/02/2019    Generalized anxiety disorder 05/26/2022    Neurogenic bladder 05/26/2022    Osteomyelitis 05/26/2022    Presence of suprapubic catheter 05/26/2022    Pure hypercholesterolemia 05/26/2022    Retention of urine, unspecified 08/09/2019    Spinal cord injury at T1-T6 level 04/20/2018     Past Surgical History:   Procedure Laterality Date    INCISION AND DRAINAGE, LOWER EXTREMITY Right 6/29/2023    Procedure: INCISION AND DRAINAGE, LOWER EXTREMITY;  Surgeon: Prabhu Shen DO;  Location: [facility];  Service: Orthopedics;  Laterality: Right;  supine bone foam wash stuff cultures    INSERTION OF INTRAMEDULLARY MARISA Right 8/10/2022    Procedure: INSERTION, INTRAMEDULLARY MARISA RIGHT TIBIA;  Surgeon: Jorge Orellana MD;  Location: [facility];  Service: Orthopedics;  Laterality: Right;     Social History     Socioeconomic History    Marital status:    Tobacco Use    Smoking status: Every Day     Current packs/day: 0.00     Types:  Cigars, Cigarettes    Smokeless tobacco: Never   Substance and Sexual Activity    Alcohol use: Yes     Alcohol/week: 2.0 standard drinks of alcohol     Types: 2 Cans of beer per week    Drug use: Not Currently    Sexual activity: Never     Current Outpatient Medications   Medication Instructions    amitriptyline (ELAVIL) 50 mg, Oral, Nightly    amLODIPine (NORVASC) 10 MG tablet 1 tablet    atorvastatin (LIPITOR) 20 mg, Oral, Nightly    busPIRone (BUSPAR) 5 MG Tab 1 tablet    carvediloL (COREG) 12.5 mg, Oral    cyclobenzaprine (FLEXERIL) 10 mg, Oral, 2 times daily PRN    doxycycline (VIBRAMYCIN) 100 mg, Oral, Daily    fenofibrate 160 mg, Oral    FLUoxetine 20 mg, Oral    gabapentin (NEURONTIN) 300 MG capsule 1 capsule    lisinopriL (PRINIVIL,ZESTRIL) 10 mg, Oral, Nightly    lisinopriL 10 mg, Oral, Daily    LORazepam (ATIVAN) 1 mg, Oral, 2 times daily    melatonin (MELATIN) 3 mg tablet TAKE TWO TABLETS BY MOUTH EVERY NIGHT AT BEDTIME AS NEEDED FOR INSOMNIA.    meloxicam (MOBIC) 15 mg, Oral, Daily    metFORMIN (GLUCOPHAGE) 500 MG tablet SMARTSI Tablet(s) By Mouth Morning-Evening    oxybutynin (DITROPAN) 5 mg, Oral, 2 times daily    oxyCODONE-acetaminophen (PERCOCET)  mg per tablet 1 tablet, Oral, Every 6 hours PRN    pantoprazole (PROTONIX) 40 MG tablet 1 tablet    temazepam (RESTORIL) 30 mg, Oral, Nightly    traZODone (DESYREL) 50 mg, Oral, Nightly    XARELTO 20 mg Tab SMARTSI Tablet(s) By Mouth Every Evening     Current Inpatient Medications   albuterol-ipratropium  3 mL Nebulization BID    amLODIPine  10 mg Per NG tube Daily    atorvastatin  20 mg Per NG tube Nightly    carvediloL  25 mg Per NG tube BID    cefTRIAXone (ROCEPHIN) IVPB  2 g Intravenous Q24H    cloNIDine 0.2 mg/24 hr td ptwk  1 patch Transdermal Q7 Days    doxycycline  100 mg Per NG tube Q12H    enoxaparin  30 mg Subcutaneous Q12H    famotidine (PF)  20 mg Intravenous Daily    fenofibrate  48 mg Per NG tube Daily    guaiFENesin 100 mg/5 ml   400 mg Per NG tube Q4H    miconazole NITRATE 2 %   Topical (Top) BID    oxybutynin  5 mg Per NG tube BID    senna-docusate 8.6-50 mg  2 tablet Per NG tube BID    sodium chloride 3%  4 mL Nebulization BID    sodium citrate-citric acid 500-334 mg/5 ml  30 mL Oral Once     Current Intravenous Infusions   clevidipine Stopped (07/31/23 2000)    dexmedeTOMIDine (Precedex) infusion (titrating) 1.4 mcg/kg/hr (08/04/23 0141)    NORepinephrine bitartrate-D5W Stopped (07/12/23 1039)    propofoL 50 mcg/kg/min (08/04/23 0144)       Objective:       Intake/Output Summary (Last 24 hours) at 8/4/2023 0404  Last data filed at 8/4/2023 0038  Gross per 24 hour   Intake 1997 ml   Output 1280 ml   Net 717 ml       Vital Signs (Most Recent):  Temp: 98.3 °F (36.8 °C) (08/04/23 0000)  Pulse: 86 (08/04/23 0031)  Resp: (!) 22 (08/04/23 0031)  BP: (!) 155/85 (08/04/23 0000)  SpO2: 99 % (08/04/23 0031)  Body mass index is 27.51 kg/m².  Weight: 86.2 kg (190 lb) Vital Signs (24h Range):  Temp:  [98.2 °F (36.8 °C)-98.9 °F (37.2 °C)] 98.3 °F (36.8 °C)  Pulse:  [] 86  Resp:  [14-33] 22  SpO2:  [91 %-100 %] 99 %  BP: (131-186)/(73-95) 155/85         Physical Exam  General:  no acute respiratory distress  Head: Normocephalic, atraumatic  Eyes: PERRL, EOMI, anicteric sclera  Neck: supple  CVS:  RRR, S1 and S2 auscultated and 2+ peripheral pulses  Resp: decreased breath sounds on left lung base anteriorly   GI:  Abdomen soft, non-distended, normoactive bowel sounds. Suprapubic catheter in place.  MSK: Wound VAC right knee  Skin: No rashes, ulcers, erythema  Neuro: Cough, gag, cornea and pupillary reflex intact; sedated on propofol and precedex.         Lines/Drains/Airways       Drain  Duration                  NG/OG Tube 07/21/23 1000 [place] sump 16 Fr. Left nostril 13 days         Suprapubic Catheter 07/31/23 1410 22 Fr. 3 days              Airway  Duration                  Airway - Non-Surgical 07/21/23 1655 13 days              Peripheral  Intravenous Line  Duration                  Midline Catheter Insertion/Assessment  - Single Lumen 07/22/23 1603 Left basilic vein (medial side of arm) other (see comments) 12 days         Peripheral IV - Single Lumen 07/27/23 2145 20 G Posterior;Left Hand 7 days                  Significant Labs:    Lab Results   Component Value Date    WBC 11.62 (H) 08/04/2023    HGB 8.5 (L) 08/04/2023    HCT 27.9 (L) 08/04/2023    MCV 80.4 08/04/2023     (H) 08/04/2023     BMP  Lab Results   Component Value Date     08/04/2023    K 4.3 08/04/2023    CHLORIDE 105 08/04/2023    CO2 25 08/04/2023    BUN 25.5 08/04/2023    CREATININE 0.80 08/04/2023    CALCIUM 8.8 08/04/2023    AGAP 10.0 07/30/2023    ESTGFRAFRICA 101 05/11/2022    EGFRNONAA >60 04/27/2022     ABG  Recent Labs   Lab 07/30/23 0523   PH 7.450   PO2 56.0*   HCO3 31.3       Mechanical Ventilation Support:  Vent Mode: A/C (08/04/23 0031)  Ventilator Initiated: Yes (07/21/23 1655)  Set Rate: 20 BPM (08/04/23 0031)  Vt Set: 450 mL (08/04/23 0031)  Pressure Support: 12 cmH20 (08/04/23 0031)  PEEP/CPAP: 5 cmH20 (08/04/23 0031)  Oxygen Concentration (%): 65 (08/04/23 0031)  Peak Airway Pressure: 16 cmH20 (08/04/23 0031)  Total Ve: 10.3 L/m (08/04/23 0031)  F/VT Ratio<105 (RSBI): (!) 50.46 (08/04/23 0031)      Assessment/Plan:     Assessment  Septic arthritis of the right knee with Strep agalactiae s/p I&D on 06/29/2023 with wound VAC in place currently on doxycycline for suppressive therapy  Acute hypoxemic respiratory failure with associated pneumonia in addition to pulmonary edema on mechanical ventilation s/p intubation x3  ANTON with CKD 4 s/p multiple sessions HD currently not receiving hemodialysis   Hypoactive delirium   Paroxysmal a-fib with permanent pacemaker in place   Hx of paraplegia secondary to spinal cord injury at level T1 with associated neurogenic bladder and chronic right ankle osteomyelitis  DM II  HTN      Plan  -failure wean from mechanical  ventilation due to altered mental status, generalized debility, poor secretion clearance-> tracheostomy and PEG tube placement pending  -unsure if consent was obtained for trach and peg; will touch base with anaesthesia and surgical services for scheduled date  -continue wound VAC per orthopedic surgery recommendations, they have recommended amputation this hospital stay but patient has refused  -infectious disease service following, on ceftriaxone with tentative end date 08/09/2023, with chronic suppressive doxycycline to continue indefinitely  -oxygen saturations stable at % on FiO2 of 45%, chest x-ray did show almost complete opacification of the left hemithorax likely related to worsening left-sided pleural effusion, may consider thoracentesis if hypoxia worsens.         DVT Prophylaxis: Lovenox  GI Prophylaxis: Shane Quintana MD PGY-1  Pulmonary Disease and Critical Care Medicine  [facility] - [facility]

## 2023-08-04 NOTE — NURSING
Nurses Note -- 4 Eyes      8/4/2023   5:03 PM      Skin assessed during: Daily Assessment      [] No Altered Skin Integrity Present    []Prevention Measures Documented      [x] Yes- Altered Skin Integrity Present or Discovered   [] LDA Added if Not in Epic (Describe Wound)   [] New Altered Skin Integrity was Present on Admit and Documented in LDA   [] Wound Image Taken    Wound Care Consulted? Yes    Attending Nurse:  Doris Bernabe RN/Staff Member:   Bob Hairston RN

## 2023-08-04 NOTE — PROGRESS NOTES
[facility] - [facility]  Pulmonary Critical Care Note    Patient Name: Bianca Khan  MRN: [id number]  Admission Date: 6/28/2023  Hospital Length of Stay: 37 days  Code Status: Full Code  Attending Provider: Franco Alfredo MD  Primary Care Provider: Areli Vargas PA-C     Subjective:     HPI:   Bianca Khan is a 58 y.o. male with PMH of paraplegia 2/2 spinal cord injury (T1-T6), neurogenic bladder with suprapubic catheter, chronic right ankle osteomyelitis, DM II, HTN, who presented to the ED on 6/28/2023 with CC of right knee pain. Per chart review, CT of right knee revealed 5 cm area of subcutaneous fluid in prepatellar region which was aspirated in the ED; he was taken to the OR on 6/29/2023 by orthopedic surgery team and was found to have prepatellar bursa infection and septic arthritis. Wound culture 6/29/2023 positive for strep agalactiae. Orthopedic surgery team recommended right-sided above-knee amputation d/t chronically infected hardware in right lower extremity. On 7/4/2023, a RRT was called d/t acute respiratory distress that was not improving despite being placed on vapotherm at 35 L/min with FiO2 100%. At the time of examination, patient's initial GCS was 10 but progressively reduced to a GCS of 6. Shared decision with patient's wife was made to intubate patient for airway protection though ABGs were within normal limits. He was admitted to the ICU for close monitoring and further medical management.  The patient underwent incision and drainage of his right knee on 06/29.  He was admitted to the ICU and intubated for airway protection due to altered mental status on 7/4 with placement of hemodialysis catheter with initiation of hemodialysis.  Patient was subsequently extubated on 07/18 for the 2nd time.  He was reintubated on 07/21 secondary to worsening hypoxia and altered mental status with associated mucus plugging.  Status post bronchoscopy x3 since admission to the  ICU for hypoxia associated with mucus plugging.  He has been off of hemodialysis for several days now.      24 Hour Interval History:  Still pending tracheostomy and PEG tube placement.Per report, there was some discussion about signing consent but unsure if it was obtained over the phone or not. No plans for trach and peg today. He did have a bronchoscopy done and thick secretions from the left mainstem bronchus were suctioned. Patient remains afebrile, however elevated WBC noted at 11.62. Renal function stable with urine output of 1030 ml since yesterday. He is sedated with Precedex at 1.4 mcg/kg and Propofol at 50 mcg/kg. Ventilator settings were weaned down to 45% FiO2 with saturation of %. Chest x -ray revealed almost complete opacification of the left hemithorax likely related to worsening left-sided pleural effusion.      ROS unobtainable 2/2 intubation, sedation.       Past Medical History:   Diagnosis Date    Arthritis     Chronic ulcer of ankle 05/26/2022    Frequent UTI 07/02/2019    Generalized anxiety disorder 05/26/2022    Neurogenic bladder 05/26/2022    Osteomyelitis 05/26/2022    Presence of suprapubic catheter 05/26/2022    Pure hypercholesterolemia 05/26/2022    Retention of urine, unspecified 08/09/2019    Spinal cord injury at T1-T6 level 04/20/2018     Past Surgical History:   Procedure Laterality Date    INCISION AND DRAINAGE, LOWER EXTREMITY Right 6/29/2023    Procedure: INCISION AND DRAINAGE, LOWER EXTREMITY;  Surgeon: Prabhu Shen DO;  Location: [facility];  Service: Orthopedics;  Laterality: Right;  supine bone foam wash stuff cultures    INSERTION OF INTRAMEDULLARY MARISA Right 8/10/2022    Procedure: INSERTION, INTRAMEDULLARY MARISA RIGHT TIBIA;  Surgeon: Jorge Orellana MD;  Location: [facility];  Service: Orthopedics;  Laterality: Right;     Social History     Socioeconomic History    Marital status:    Tobacco Use    Smoking status: Every Day     Current packs/day: 0.00     Types:  Cigars, Cigarettes    Smokeless tobacco: Never   Substance and Sexual Activity    Alcohol use: Yes     Alcohol/week: 2.0 standard drinks of alcohol     Types: 2 Cans of beer per week    Drug use: Not Currently    Sexual activity: Never     Current Outpatient Medications   Medication Instructions    amitriptyline (ELAVIL) 50 mg, Oral, Nightly    amLODIPine (NORVASC) 10 MG tablet 1 tablet    atorvastatin (LIPITOR) 20 mg, Oral, Nightly    busPIRone (BUSPAR) 5 MG Tab 1 tablet    carvediloL (COREG) 12.5 mg, Oral    cyclobenzaprine (FLEXERIL) 10 mg, Oral, 2 times daily PRN    doxycycline (VIBRAMYCIN) 100 mg, Oral, Daily    fenofibrate 160 mg, Oral    FLUoxetine 20 mg, Oral    gabapentin (NEURONTIN) 300 MG capsule 1 capsule    lisinopriL (PRINIVIL,ZESTRIL) 10 mg, Oral, Nightly    lisinopriL 10 mg, Oral, Daily    LORazepam (ATIVAN) 1 mg, Oral, 2 times daily    melatonin (MELATIN) 3 mg tablet TAKE TWO TABLETS BY MOUTH EVERY NIGHT AT BEDTIME AS NEEDED FOR INSOMNIA.    meloxicam (MOBIC) 15 mg, Oral, Daily    metFORMIN (GLUCOPHAGE) 500 MG tablet SMARTSI Tablet(s) By Mouth Morning-Evening    oxybutynin (DITROPAN) 5 mg, Oral, 2 times daily    oxyCODONE-acetaminophen (PERCOCET)  mg per tablet 1 tablet, Oral, Every 6 hours PRN    pantoprazole (PROTONIX) 40 MG tablet 1 tablet    temazepam (RESTORIL) 30 mg, Oral, Nightly    traZODone (DESYREL) 50 mg, Oral, Nightly    XARELTO 20 mg Tab SMARTSI Tablet(s) By Mouth Every Evening     Current Inpatient Medications   albuterol-ipratropium  3 mL Nebulization BID    amLODIPine  10 mg Per NG tube Daily    atorvastatin  20 mg Per NG tube Nightly    carvediloL  25 mg Per NG tube BID    cefTRIAXone (ROCEPHIN) IVPB  2 g Intravenous Q24H    cloNIDine 0.2 mg/24 hr td ptwk  1 patch Transdermal Q7 Days    doxycycline  100 mg Per NG tube Q12H    enoxaparin  30 mg Subcutaneous Q12H    famotidine (PF)  20 mg Intravenous Daily    fenofibrate  48 mg Per NG tube Daily    guaiFENesin 100 mg/5 ml   400 mg Per NG tube Q4H    miconazole NITRATE 2 %   Topical (Top) BID    oxybutynin  5 mg Per NG tube BID    senna-docusate 8.6-50 mg  2 tablet Per NG tube BID    sodium chloride 3%  4 mL Nebulization BID    sodium citrate-citric acid 500-334 mg/5 ml  30 mL Oral Once     Current Intravenous Infusions   clevidipine Stopped (07/31/23 2000)    dexmedeTOMIDine (Precedex) infusion (titrating) 1.4 mcg/kg/hr (08/04/23 0141)    NORepinephrine bitartrate-D5W Stopped (07/12/23 1039)    propofoL 50 mcg/kg/min (08/04/23 0144)       Objective:       Intake/Output Summary (Last 24 hours) at 8/4/2023 0346  Last data filed at 8/4/2023 0038  Gross per 24 hour   Intake 1997 ml   Output 1980 ml   Net 17 ml       Vital Signs (Most Recent):  Temp: 98.3 °F (36.8 °C) (08/04/23 0000)  Pulse: 86 (08/04/23 0031)  Resp: (!) 22 (08/04/23 0031)  BP: (!) 155/85 (08/04/23 0000)  SpO2: 99 % (08/04/23 0031)  Body mass index is 27.51 kg/m².  Weight: 86.2 kg (190 lb) Vital Signs (24h Range):  Temp:  [97.9 °F (36.6 °C)-98.9 °F (37.2 °C)] 98.3 °F (36.8 °C)  Pulse:  [] 86  Resp:  [14-33] 22  SpO2:  [91 %-100 %] 99 %  BP: (131-186)/(73-95) 155/85         Physical Exam  General:  no acute respiratory distress  Head: Normocephalic, atraumatic  Eyes: PERRL, EOMI, anicteric sclera  Neck: supple  CVS:  RRR, S1 and S2 auscultated and 2+ peripheral pulses  Resp: decreased breath sounds on left lung base anteriorly   GI:  Abdomen soft, non-distended, normoactive bowel sounds. Suprapubic catheter in place.  MSK: Wound VAC right knee  Skin: No rashes, ulcers, erythema  Neuro: Cough, gag, cornea and pupillary reflex intact; sedated on propofol and precedex.         Lines/Drains/Airways       Drain  Duration                  NG/OG Tube 07/21/23 1000 [place] sump 16 Fr. Left nostril 13 days         Suprapubic Catheter 07/31/23 1410 22 Fr. 3 days              Airway  Duration                  Airway - Non-Surgical 07/21/23 1655 13 days              Peripheral  Intravenous Line  Duration                  Midline Catheter Insertion/Assessment  - Single Lumen 07/22/23 1603 Left basilic vein (medial side of arm) other (see comments) 12 days         Peripheral IV - Single Lumen 07/27/23 2145 20 G Posterior;Left Hand 7 days                  Significant Labs:    Lab Results   Component Value Date    WBC 11.62 (H) 08/04/2023    HGB 8.5 (L) 08/04/2023    HCT 27.9 (L) 08/04/2023    MCV 80.4 08/04/2023     (H) 08/04/2023     BMP  Lab Results   Component Value Date     08/04/2023    K 4.3 08/04/2023    CHLORIDE 105 08/04/2023    CO2 25 08/04/2023    BUN 25.5 08/04/2023    CREATININE 0.80 08/04/2023    CALCIUM 8.8 08/04/2023    AGAP 10.0 07/30/2023    ESTGFRAFRICA 101 05/11/2022    EGFRNONAA >60 04/27/2022     ABG  Recent Labs   Lab 07/30/23 0523   PH 7.450   PO2 56.0*   HCO3 31.3       Mechanical Ventilation Support:  Vent Mode: A/C (08/04/23 0031)  Ventilator Initiated: Yes (07/21/23 1655)  Set Rate: 20 BPM (08/04/23 0031)  Vt Set: 450 mL (08/04/23 0031)  Pressure Support: 12 cmH20 (08/04/23 0031)  PEEP/CPAP: 5 cmH20 (08/04/23 0031)  Oxygen Concentration (%): 65 (08/04/23 0031)  Peak Airway Pressure: 16 cmH20 (08/04/23 0031)  Total Ve: 10.3 L/m (08/04/23 0031)  F/VT Ratio<105 (RSBI): (!) 50.46 (08/04/23 0031)      Assessment/Plan:     Assessment  Septic arthritis of the right knee with Strep agalactiae s/p I&D on 06/29/2023 with wound VAC in place currently on doxycycline for suppressive therapy  Acute hypoxemic respiratory failure with associated pneumonia in addition to pulmonary edema on mechanical ventilation s/p intubation x3  ANTON with CKD 4 s/p multiple sessions HD currently not receiving hemodialysis   Hypoactive delirium   Paroxysmal a-fib with permanent pacemaker in place   Hx of paraplegia secondary to spinal cord injury at level T1 with associated neurogenic bladder and chronic right ankle osteomyelitis  DM II  HTN      Plan  -failure wean from mechanical  ventilation due to altered mental status, generalized debility, poor secretion clearance-> tracheostomy and PEG tube placement pending  -unsure if consent was obtained for trach and peg; will touch base with anaesthesia and surgical services for scheduled date  -continue wound VAC per orthopedic surgery recommendations, they have recommended amputation this hospital stay but patient has refused  -infectious disease service following, on ceftriaxone with tentative end date 08/09/2023, with chronic suppressive doxycycline to continue indefinitely  -oxygen saturations stable at % on FiO2 of 45%, chest x-ray did show almost complete opacification of the left hemithorax likely related to worsening left-sided pleural effusion, may consider thoracentesis if hypoxia worsens.         DVT Prophylaxis: Lovenox  GI Prophylaxis: Shane Quintana MD PGY-1  Pulmonary Disease and Critical Care Medicine  [facility] - [facility]

## 2023-08-04 NOTE — PROGRESS NOTES
Infectious Diseases Progress Note  58-year-old male with past medical history of T-spine cord injury with paraplegia, diabetes, HTN, hepatitis-C, chronic right ankle wound/osteomyelitis, was on suppressive doxycycline, known to my service, seen by us initially at [facility] and has followed with us at the office for chronic osteomyelitis, is admitted to [facility] on 06/28/2023 presenting through the ED with complaints of pain and swelling to his right knee, apparently struck his knee.  He did also report prior remote right knee surgery.  He was evaluated and noted to have no fevers initially but a temperature of 100.2° today 6/29, no leukocytosis but with thrombocytosis of up to 531 today.  .2 and ESR >130.  He is anemic with low albumin.  Blood cultures have been negative.  CT scan of the right knee noted a 5 cm area of subcutaneous fluid collection in the prepatellar region.  There was aspiration in the ER with findings of purulent fluid and cultures showing group B Streptococcus.  He was seen by the orthopedic surgery team with findings of right prepatellar bursa abscess and is status post incision and drainage of right knee septic arthritis and right prepatellar bursa abscess today 6/29 with group B Streptococcus  He is on ceftriaxone.    Subjective:  Interval history noted, status post therapeutic bronchoscopy for mucus plugging today.  No fevers, doing about the same.  Remains in the ICU orally intubated on ventilator  in no acute distress      Past Medical History:   Diagnosis Date    Arthritis     Chronic ulcer of ankle 05/26/2022    Frequent UTI 07/02/2019    Generalized anxiety disorder 05/26/2022    Neurogenic bladder 05/26/2022    Osteomyelitis 05/26/2022    Presence of suprapubic catheter 05/26/2022    Pure hypercholesterolemia 05/26/2022    Retention of urine, unspecified 08/09/2019    Spinal cord injury at T1-T6 level 04/20/2018     Past Surgical History:    Procedure Laterality Date    INCISION AND DRAINAGE, LOWER EXTREMITY Right 6/29/2023    Procedure: INCISION AND DRAINAGE, LOWER EXTREMITY;  Surgeon: Prabhu Shen DO;  Location: [facility] OR;  Service: Orthopedics;  Laterality: Right;  supine bone foam wash stuff cultures    INSERTION OF INTRAMEDULLARY MARISA Right 8/10/2022    Procedure: INSERTION, INTRAMEDULLARY MARISA RIGHT TIBIA;  Surgeon: Jorge Orellana MD;  Location: [facility] OR;  Service: Orthopedics;  Laterality: Right;     Social History     Socioeconomic History    Marital status:    Tobacco Use    Smoking status: Every Day     Current packs/day: 0.00     Types: Cigars, Cigarettes    Smokeless tobacco: Never   Substance and Sexual Activity    Alcohol use: Yes     Alcohol/week: 2.0 standard drinks of alcohol     Types: 2 Cans of beer per week    Drug use: Not Currently    Sexual activity: Never       Review of Systems   Unable to perform ROS: Intubated       Review of patient's allergies indicates:   Allergen Reactions    Baclofen Itching and Anxiety         Scheduled Meds:   albuterol-ipratropium  3 mL Nebulization BID    amLODIPine  10 mg Per NG tube Daily    atorvastatin  20 mg Per NG tube Nightly    carvediloL  25 mg Per NG tube BID    cefTRIAXone (ROCEPHIN) IVPB  2 g Intravenous Q24H    cloNIDine 0.2 mg/24 hr td ptwk  1 patch Transdermal Q7 Days    doxycycline  100 mg Per NG tube Q12H    enoxaparin  30 mg Subcutaneous Q12H    famotidine (PF)  20 mg Intravenous Daily    fenofibrate  48 mg Per NG tube Daily    guaiFENesin 100 mg/5 ml  400 mg Per NG tube Q4H    miconazole NITRATE 2 %   Topical (Top) BID    oxybutynin  5 mg Per NG tube BID    senna-docusate 8.6-50 mg  2 tablet Per NG tube BID    sodium chloride 3%  4 mL Nebulization BID    sodium citrate-citric acid 500-334 mg/5 ml  30 mL Oral Once     Continuous Infusions:   clevidipine Stopped (07/31/23 2000)    dexmedeTOMIDine (Precedex) infusion (titrating) 1.2 mcg/kg/hr (08/03/23 2148)    NORepinephrine  "bitartrate-D5W Stopped (23 1039)    propofoL 40 mcg/kg/min (237)     PRN Meds:0.9%  NaCl infusion (for blood administration), acetaminophen, etomidate, [] fentaNYL **FOLLOWED BY** fentaNYL, fentaNYL, hydrALAZINE, labetalol, oxyCODONE-acetaminophen, rocuronium, sodium chloride 0.9%, sodium chloride 0.9%    Objective:  BP (!) 147/79   Pulse 81   Temp 98.2 °F (36.8 °C) (Axillary)   Resp (!) 24   Ht 5' 9.69" (1.77 m)   Wt 86.2 kg (190 lb)   SpO2 100%   BMI 27.51 kg/m²     Physical Exam:   Physical Exam  Vitals reviewed.   HENT:      Head: Normocephalic.   Cardiovascular:      Rate and Rhythm: Normal rate and regular rhythm.   Pulmonary:      Effort: Pulmonary effort is normal. No respiratory distress.      Breath sounds: B/L BS coarse. No wheezing.      Comments: On vent  Abdominal:      General: Bowel sounds are normal. There is no distension.     Palpations: Abdomen is soft.      Tenderness: There is no abdominal tenderness.   Genitourinary:     Comments: Suprapubic catheter  Musculoskeletal:      Cervical back: Normal range of motion.      Comments: Paraplegic   Skin:     Findings: No rash.      Comments: Wounds dressed. R knee with wound vac in place   Neurological:      Mental Status: Unable to fully assess, pt intubated and on vent  Psychiatric:         Behavior: Sedated     Imaging  Imaging Results              CT Knee W W/O Contrast Right (Final result)  Result time 23 18:37:42      Final result by Gustavo Cassidy MD (23 18:37:42)                   Impression:      Mildly limited assessment.  5 cm area of subcutaneous fluid in the prepatellar region may have thin peripheral enhancement.  Fluid collection is possible.    Subcutaneous edema in the calf.      Electronically signed by: Gustavo Cassidy  Date:    2023  Time:    18:37               Narrative:    EXAMINATION:  CT KNEE W W/O CONTRAST RIGHT    CLINICAL HISTORY:  possible infection;    TECHNIQUE:  CT imaging " of the right knee before and after IV contrast.  Axial, coronal and sagittal reformatted images reviewed.   Dose length product is 585 mGycm. Automatic exposure control, adjustment of mA/kV or iterative reconstruction technique used to limit radiation dose.    COMPARISON:  Radiograph 06/28/2023    FINDINGS:  Assessment mildly limited by motion.  Joint alignments are maintained with degenerative changes at the knee.  Fixation hardware in the lower femur and tibia with remote proximal fibular fracture.  Areas of heterotopic ossification along the lower portion of the femur.  Small knee effusion.  Areas of subcutaneous edema anteriorly below the knee.  More focal area of fluid in the subcutaneous tissues of the prepatellar region measures up to 5 cm in transverse diameter and 5 cm in length.  There is image noise from the hardware, but this fluid may have thin areas of peripheral enhancement.  No tracking subcutaneous gas.                                       X-Ray Tibia Fibula 2 View Right (Final result)  Result time 06/28/23 18:14:06      Final result by Tracy Zamudio MD (06/28/23 18:14:06)                   Impression:      Posttraumatic and postsurgical changes at the femur and lower leg.  There is chronic deformity at the distal femur with heterogeneous sclerosis and lucency superimposed on background bony demineralization.  No old imaging of this region available for comparison.  This makes it difficult to evaluate for acute superimposed lytic change.    Postsurgical and posttraumatic change at the tibia and fibula with bony demineralization.  No appreciable acute osseous abnormality.      Electronically signed by: Tracy Zamudio  Date:    06/28/2023  Time:    18:14               Narrative:    EXAMINATION:  XR FEMUR 2 VIEW RIGHT; XR TIBIA FIBULA 2 VIEW RIGHT    CLINICAL HISTORY:  possible infection;; infection;    TECHNIQUE:  AP and lateral views of the right femur were performed.    AP and lateral  views of the right tibia and fibula were obtained.    COMPARISON:  Knee radiographs 06/28/2023, tibia and fibular radiographs 08/10/2022    FINDINGS:  There are postsurgical changes of ORIF at the distal femur with lateral plate and screws.  There are postsurgical changes of ORIF at the tibia with antegrade intramedullary raven and proximal and distal interlocking screws.  Hardware appears intact.  No acute fracture seen.  There are old, healed fracture deformities.    There is chronic remodeling at the distal femur with heterogeneous appearance of the marrow and cortex distally.  There are areas of lucency as well as sclerosis.  There is no imaging through the distal femur available for comparison to evaluate for acute lytic changes superimposed on chronic background posttraumatic and postsurgical change.  Older prior radiographs of the tibia and fibula do not adequately imaged the area.  The bones are demineralized.    There is soft tissue swelling at the knee.  There is soft tissue swelling at the ankle.                                       X-Ray Femur 2 View Right (Final result)  Result time 06/28/23 18:14:06      Final result by Tracy Zamudio MD (06/28/23 18:14:06)                   Impression:      Posttraumatic and postsurgical changes at the femur and lower leg.  There is chronic deformity at the distal femur with heterogeneous sclerosis and lucency superimposed on background bony demineralization.  No old imaging of this region available for comparison.  This makes it difficult to evaluate for acute superimposed lytic change.    Postsurgical and posttraumatic change at the tibia and fibula with bony demineralization.  No appreciable acute osseous abnormality.      Electronically signed by: Tracy Zamudio  Date:    06/28/2023  Time:    18:14               Narrative:    EXAMINATION:  XR FEMUR 2 VIEW RIGHT; XR TIBIA FIBULA 2 VIEW RIGHT    CLINICAL HISTORY:  possible infection;;  infection;    TECHNIQUE:  AP and lateral views of the right femur were performed.    AP and lateral views of the right tibia and fibula were obtained.    COMPARISON:  Knee radiographs 06/28/2023, tibia and fibular radiographs 08/10/2022    FINDINGS:  There are postsurgical changes of ORIF at the distal femur with lateral plate and screws.  There are postsurgical changes of ORIF at the tibia with antegrade intramedullary raven and proximal and distal interlocking screws.  Hardware appears intact.  No acute fracture seen.  There are old, healed fracture deformities.    There is chronic remodeling at the distal femur with heterogeneous appearance of the marrow and cortex distally.  There are areas of lucency as well as sclerosis.  There is no imaging through the distal femur available for comparison to evaluate for acute lytic changes superimposed on chronic background posttraumatic and postsurgical change.  Older prior radiographs of the tibia and fibula do not adequately imaged the area.  The bones are demineralized.    There is soft tissue swelling at the knee.  There is soft tissue swelling at the ankle.                                       X-Ray Knee 3 View Right (Final result)  Result time 06/28/23 14:18:03      Final result by Lanette Waller MD (06/28/23 14:18:03)                   Impression:      1. No acute bony abnormality.  2. Soft tissue swelling around the knee.      Electronically signed by: Lanette Waller  Date:    06/28/2023  Time:    14:18               Narrative:    EXAMINATION:  XR KNEE 3 VIEW RIGHT    CLINICAL HISTORY:  Effusion, right knee    COMPARISON:  None.    FINDINGS:  There has been prior internal fixation of the femur and tibia.  There are chronic changes of the bones with heterotopic ossification around the knee.  There is no acute fracture identified.  There is soft tissue swelling around the knee.                                       Lab Review   Recent Results (from the past 24  hour(s))   CBC with Differential    Collection Time: 08/03/23  2:19 AM   Result Value Ref Range    WBC 9.07 4.50 - 11.50 x10(3)/mcL    RBC 3.66 (L) 4.70 - 6.10 x10(6)/mcL    Hgb 9.2 (L) 14.0 - 18.0 g/dL    Hct 30.3 (L) 42.0 - 52.0 %    MCV 82.8 80.0 - 94.0 fL    MCH 25.1 (L) 27.0 - 31.0 pg    MCHC 30.4 (L) 33.0 - 36.0 g/dL    RDW 21.7 (H) 11.5 - 17.0 %    Platelet 649 (H) 130 - 400 x10(3)/mcL    MPV 10.0 7.4 - 10.4 fL    Neut % 59.2 %    Lymph % 29.2 %    Mono % 9.2 %    Eos % 1.4 %    Basophil % 0.3 %    Lymph # 2.65 0.6 - 4.6 x10(3)/mcL    Neut # 5.37 2.1 - 9.2 x10(3)/mcL    Mono # 0.83 0.1 - 1.3 x10(3)/mcL    Eos # 0.13 0 - 0.9 x10(3)/mcL    Baso # 0.03 <=0.2 x10(3)/mcL    IG# 0.06 (H) 0 - 0.04 x10(3)/mcL    IG% 0.7 %    NRBC% 0.0 %   Comprehensive Metabolic Panel    Collection Time: 08/03/23  2:19 AM   Result Value Ref Range    Sodium Level 139 136 - 145 mmol/L    Potassium Level 4.2 3.5 - 5.1 mmol/L    Chloride 105 98 - 107 mmol/L    Carbon Dioxide 25 22 - 29 mmol/L    Glucose Level 140 (H) 74 - 100 mg/dL    Blood Urea Nitrogen 29.3 (H) 8.4 - 25.7 mg/dL    Creatinine 0.77 0.73 - 1.18 mg/dL    Calcium Level Total 9.2 8.4 - 10.2 mg/dL    Protein Total 7.6 6.4 - 8.3 gm/dL    Albumin Level 2.5 (L) 3.5 - 5.0 g/dL    Globulin 5.1 (H) 2.4 - 3.5 gm/dL    Albumin/Globulin Ratio 0.5 (L) 1.1 - 2.0 ratio    Bilirubin Total 0.2 <=1.5 mg/dL    Alkaline Phosphatase 77 40 - 150 unit/L    Alanine Aminotransferase 15 0 - 55 unit/L    Aspartate Aminotransferase 15 5 - 34 unit/L    eGFR >60 mls/min/1.73/m2             Assessment/Plan:  1. SIRS with fevers  2. Group B Streptococcus Right knee prepatellar abscess  3. Group B Streptococcus Right knee septic arthritis  4.  Anemia/reactive thrombocytosis  5.  Paraplegia secondary to T-spine cord injury  6. History of hepatitis-C   7. Chronic right ankle wound/osteomyelitis  8.  Diabetes    9.  Acute kidney injury  10. Acute hypoxic respiratory failure  11. Pulmonary edema with pleural  effusions/ Pneumonitis      -Continue ceftriaxone with end date of 08/09.  He is to continue maintenance doxycycline indefinitely for chronic suppression  -No fevers noted and no leukocytosis  -3/1 chest x-ray results noted  -S/p therapeutic bronchoscopy for mucus plugging today 8/3  -Stool for c-diff PCR and toxin (-)  -7/4 and 7/8 blood cultures negative and sputum culture with moderate yeast.    -7/4 CT scan of the abdomen and pelvis and 7/3 chest x-ray results noted, likely dealing with pulmonary edema with pleural effusions and possibly superimposed infectious pneumonitis.   -6/28 right knee abscess culture with Group B Streptococcus  -Seen by Orthopedic surgery team s/p I&D of R prepatellar bursa abscess and septic R knee with cultures yielding Group G Streptococcus  -6/28 blood cultures negative  -Multiple comorbidities, paraplegic with chronic pressure wounds, right ankle osteomyelitis with exposed bone on chronic suppressive doxycycline  -We will continue surgical site care per Orthopedic surgery team  -We will continue wound care  -He is to continue management of his hepatitis-C as outpatient  -Renal impairment noted, HD per Nephrology, started 7/4  -7/3 Renal ultrasound results noted  -Re-intubated on 7/21 followed by Bronchoscopy with mucus plugging noted  -Plan for PEG and tracheostomy placement on hold pending obtaining consent from wife  -Continue vent management and ICU support per Intensivist  -Discussed with nursing staff.

## 2023-08-05 LAB
ALBUMIN SERPL-MCNC: 2.1 G/DL (ref 3.5–5)
ALBUMIN/GLOB SERPL: 0.4 RATIO (ref 1.1–2)
ALP SERPL-CCNC: 75 UNIT/L (ref 40–150)
ALT SERPL-CCNC: 14 UNIT/L (ref 0–55)
AST SERPL-CCNC: 14 UNIT/L (ref 5–34)
BASOPHILS # BLD AUTO: 0.02 X10(3)/MCL
BASOPHILS NFR BLD AUTO: 0.2 %
BILIRUBIN DIRECT+TOT PNL SERPL-MCNC: 0.3 MG/DL
BUN SERPL-MCNC: 19.7 MG/DL (ref 8.4–25.7)
CALCIUM SERPL-MCNC: 9.3 MG/DL (ref 8.4–10.2)
CHLORIDE SERPL-SCNC: 105 MMOL/L (ref 98–107)
CO2 SERPL-SCNC: 24 MMOL/L (ref 22–29)
CREAT SERPL-MCNC: 0.77 MG/DL (ref 0.73–1.18)
EOSINOPHIL # BLD AUTO: 0.1 X10(3)/MCL (ref 0–0.9)
EOSINOPHIL NFR BLD AUTO: 1 %
ERYTHROCYTE [DISTWIDTH] IN BLOOD BY AUTOMATED COUNT: 20.8 % (ref 11.5–17)
GFR SERPLBLD CREATININE-BSD FMLA CKD-EPI: >60 MLS/MIN/1.73/M2
GLOBULIN SER-MCNC: 5.4 GM/DL (ref 2.4–3.5)
GLUCOSE SERPL-MCNC: 172 MG/DL (ref 74–100)
HCT VFR BLD AUTO: 26.1 % (ref 42–52)
HGB BLD-MCNC: 8.1 G/DL (ref 14–18)
IMM GRANULOCYTES # BLD AUTO: 0.04 X10(3)/MCL (ref 0–0.04)
IMM GRANULOCYTES NFR BLD AUTO: 0.4 %
LYMPHOCYTES # BLD AUTO: 2.25 X10(3)/MCL (ref 0.6–4.6)
LYMPHOCYTES NFR BLD AUTO: 21.9 %
MCH RBC QN AUTO: 24.5 PG (ref 27–31)
MCHC RBC AUTO-ENTMCNC: 31 G/DL (ref 33–36)
MCV RBC AUTO: 78.9 FL (ref 80–94)
MONOCYTES # BLD AUTO: 0.87 X10(3)/MCL (ref 0.1–1.3)
MONOCYTES NFR BLD AUTO: 8.5 %
NEUTROPHILS # BLD AUTO: 6.99 X10(3)/MCL (ref 2.1–9.2)
NEUTROPHILS NFR BLD AUTO: 68 %
NRBC BLD AUTO-RTO: 0 %
PLATELET # BLD AUTO: 571 X10(3)/MCL (ref 130–400)
PMV BLD AUTO: 10.6 FL (ref 7.4–10.4)
POTASSIUM SERPL-SCNC: 3.9 MMOL/L (ref 3.5–5.1)
PROT SERPL-MCNC: 7.5 GM/DL (ref 6.4–8.3)
RBC # BLD AUTO: 3.31 X10(6)/MCL (ref 4.7–6.1)
SODIUM SERPL-SCNC: 140 MMOL/L (ref 136–145)
WBC # SPEC AUTO: 10.27 X10(3)/MCL (ref 4.5–11.5)

## 2023-08-05 PROCEDURE — 85025 COMPLETE CBC W/AUTO DIFF WBC: CPT | Performed by: STUDENT IN AN ORGANIZED HEALTH CARE EDUCATION/TRAINING PROGRAM

## 2023-08-05 PROCEDURE — 25000003 PHARM REV CODE 250

## 2023-08-05 PROCEDURE — 25000242 PHARM REV CODE 250 ALT 637 W/ HCPCS: Performed by: INTERNAL MEDICINE

## 2023-08-05 PROCEDURE — 27100171 HC OXYGEN HIGH FLOW UP TO 24 HOURS

## 2023-08-05 PROCEDURE — 94761 N-INVAS EAR/PLS OXIMETRY MLT: CPT

## 2023-08-05 PROCEDURE — 99900025 HC BRONCHOSCOPY-ASST (STAT)

## 2023-08-05 PROCEDURE — 94640 AIRWAY INHALATION TREATMENT: CPT

## 2023-08-05 PROCEDURE — 99900026 HC AIRWAY MAINTENANCE (STAT)

## 2023-08-05 PROCEDURE — 80053 COMPREHEN METABOLIC PANEL: CPT | Performed by: STUDENT IN AN ORGANIZED HEALTH CARE EDUCATION/TRAINING PROGRAM

## 2023-08-05 PROCEDURE — 63600175 PHARM REV CODE 636 W HCPCS: Performed by: INTERNAL MEDICINE

## 2023-08-05 PROCEDURE — 31622 DX BRONCHOSCOPE/WASH: CPT

## 2023-08-05 PROCEDURE — 99900035 HC TECH TIME PER 15 MIN (STAT)

## 2023-08-05 PROCEDURE — 20000000 HC ICU ROOM

## 2023-08-05 PROCEDURE — 94003 VENT MGMT INPAT SUBQ DAY: CPT

## 2023-08-05 PROCEDURE — 25000003 PHARM REV CODE 250: Performed by: STUDENT IN AN ORGANIZED HEALTH CARE EDUCATION/TRAINING PROGRAM

## 2023-08-05 PROCEDURE — 25000003 PHARM REV CODE 250: Performed by: INTERNAL MEDICINE

## 2023-08-05 PROCEDURE — 63600175 PHARM REV CODE 636 W HCPCS

## 2023-08-05 RX ORDER — LIDOCAINE HYDROCHLORIDE 10 MG/ML
1 INJECTION INFILTRATION; PERINEURAL ONCE
Status: COMPLETED | OUTPATIENT
Start: 2023-08-05 | End: 2023-08-05

## 2023-08-05 RX ORDER — LIDOCAINE HYDROCHLORIDE 10 MG/ML
INJECTION INFILTRATION; PERINEURAL
Status: COMPLETED
Start: 2023-08-05 | End: 2023-08-05

## 2023-08-05 RX ORDER — SODIUM CHLORIDE FOR INHALATION 3 %
4 VIAL, NEBULIZER (ML) INHALATION EVERY 6 HOURS
Status: DISCONTINUED | OUTPATIENT
Start: 2023-08-05 | End: 2023-08-09 | Stop reason: HOSPADM

## 2023-08-05 RX ORDER — FENTANYL CITRATE 50 UG/ML
200 INJECTION, SOLUTION INTRAMUSCULAR; INTRAVENOUS ONCE
Status: COMPLETED | OUTPATIENT
Start: 2023-08-05 | End: 2023-08-05

## 2023-08-05 RX ADMIN — SENNOSIDES AND DOCUSATE SODIUM 2 TABLET: 50; 8.6 TABLET ORAL at 07:08

## 2023-08-05 RX ADMIN — GUAIFENESIN 400 MG: 200 SOLUTION ORAL at 02:08

## 2023-08-05 RX ADMIN — DEXMEDETOMIDINE HYDROCHLORIDE 1.4 MCG/KG/HR: 400 INJECTION INTRAVENOUS at 04:08

## 2023-08-05 RX ADMIN — PROPOFOL 50 MCG/KG/MIN: 10 INJECTION, EMULSION INTRAVENOUS at 11:08

## 2023-08-05 RX ADMIN — ENOXAPARIN SODIUM 30 MG: 30 INJECTION SUBCUTANEOUS at 07:08

## 2023-08-05 RX ADMIN — SODIUM CHLORIDE SOLN NEBU 3% 4 ML: 3 NEBU SOLN at 01:08

## 2023-08-05 RX ADMIN — MICONAZOLE NITRATE 2 % TOPICAL POWDER: at 08:08

## 2023-08-05 RX ADMIN — PROPOFOL 50 MCG/KG/MIN: 10 INJECTION, EMULSION INTRAVENOUS at 05:08

## 2023-08-05 RX ADMIN — GUAIFENESIN 400 MG: 200 SOLUTION ORAL at 06:08

## 2023-08-05 RX ADMIN — DEXMEDETOMIDINE HYDROCHLORIDE 1.4 MCG/KG/HR: 400 INJECTION INTRAVENOUS at 08:08

## 2023-08-05 RX ADMIN — DEXMEDETOMIDINE HYDROCHLORIDE 1.2 MCG/KG/HR: 400 INJECTION INTRAVENOUS at 11:08

## 2023-08-05 RX ADMIN — LIDOCAINE HYDROCHLORIDE 1 ML: 10 INJECTION INFILTRATION; PERINEURAL at 04:08

## 2023-08-05 RX ADMIN — FAMOTIDINE 20 MG: 10 INJECTION, SOLUTION INTRAVENOUS at 07:08

## 2023-08-05 RX ADMIN — DOXYCYCLINE HYCLATE 100 MG: 100 TABLET, COATED ORAL at 08:08

## 2023-08-05 RX ADMIN — CEFTRIAXONE SODIUM 2 G: 2 INJECTION, POWDER, FOR SOLUTION INTRAMUSCULAR; INTRAVENOUS at 11:08

## 2023-08-05 RX ADMIN — ATORVASTATIN CALCIUM 20 MG: 10 TABLET, FILM COATED ORAL at 08:08

## 2023-08-05 RX ADMIN — CARVEDILOL 25 MG: 12.5 TABLET, FILM COATED ORAL at 08:08

## 2023-08-05 RX ADMIN — DOXYCYCLINE HYCLATE 100 MG: 100 TABLET, COATED ORAL at 07:08

## 2023-08-05 RX ADMIN — MICONAZOLE NITRATE 2 % TOPICAL POWDER: at 07:08

## 2023-08-05 RX ADMIN — HYDRALAZINE HYDROCHLORIDE 10 MG: 20 INJECTION INTRAMUSCULAR; INTRAVENOUS at 02:08

## 2023-08-05 RX ADMIN — FENOFIBRATE 48 MG: 48 TABLET, FILM COATED ORAL at 07:08

## 2023-08-05 RX ADMIN — CARVEDILOL 25 MG: 12.5 TABLET, FILM COATED ORAL at 07:08

## 2023-08-05 RX ADMIN — DEXMEDETOMIDINE HYDROCHLORIDE 1.4 MCG/KG/HR: 400 INJECTION INTRAVENOUS at 12:08

## 2023-08-05 RX ADMIN — FENTANYL CITRATE 200 MCG: 50 INJECTION, SOLUTION INTRAMUSCULAR; INTRAVENOUS at 04:08

## 2023-08-05 RX ADMIN — GUAIFENESIN 400 MG: 200 SOLUTION ORAL at 07:08

## 2023-08-05 RX ADMIN — IPRATROPIUM BROMIDE AND ALBUTEROL SULFATE 3 ML: 2.5; .5 SOLUTION RESPIRATORY (INHALATION) at 08:08

## 2023-08-05 RX ADMIN — SODIUM CHLORIDE 30 MG/ML INHALATION SOLUTION 4 ML: 30 SOLUTION INHALANT at 08:08

## 2023-08-05 RX ADMIN — LIDOCAINE HYDROCHLORIDE 1 ML: 10 INJECTION, SOLUTION INFILTRATION; PERINEURAL at 04:08

## 2023-08-05 RX ADMIN — AMLODIPINE BESYLATE 10 MG: 5 TABLET ORAL at 07:08

## 2023-08-05 RX ADMIN — GUAIFENESIN 400 MG: 200 SOLUTION ORAL at 10:08

## 2023-08-05 RX ADMIN — DEXMEDETOMIDINE HYDROCHLORIDE 1.4 MCG/KG/HR: 400 INJECTION INTRAVENOUS at 06:08

## 2023-08-05 RX ADMIN — OXYBUTYNIN CHLORIDE 5 MG: 5 TABLET ORAL at 07:08

## 2023-08-05 RX ADMIN — DEXMEDETOMIDINE HYDROCHLORIDE 1.4 MCG/KG/HR: 400 INJECTION INTRAVENOUS at 02:08

## 2023-08-05 RX ADMIN — SODIUM CHLORIDE SOLN NEBU 3% 4 ML: 3 NEBU SOLN at 08:08

## 2023-08-05 RX ADMIN — ENOXAPARIN SODIUM 30 MG: 30 INJECTION SUBCUTANEOUS at 08:08

## 2023-08-05 RX ADMIN — PROPOFOL 50 MCG/KG/MIN: 10 INJECTION, EMULSION INTRAVENOUS at 02:08

## 2023-08-05 RX ADMIN — OXYBUTYNIN CHLORIDE 5 MG: 5 TABLET ORAL at 08:08

## 2023-08-05 RX ADMIN — CEFTRIAXONE SODIUM 2 G: 2 INJECTION, POWDER, FOR SOLUTION INTRAMUSCULAR; INTRAVENOUS at 12:08

## 2023-08-05 RX ADMIN — GUAIFENESIN 400 MG: 200 SOLUTION ORAL at 05:08

## 2023-08-05 NOTE — NURSING
Nurses Note -- 4 Eyes      8/5/2023   10:28 AM      Skin assessed during: Daily Assessment      [] No Altered Skin Integrity Present    []Prevention Measures Documented      [x] Yes- Altered Skin Integrity Present or Discovered   [] LDA Added if Not in Epic (Describe Wound)   [] New Altered Skin Integrity was Present on Admit and Documented in LDA   [] Wound Image Taken    Wound Care Consulted? Yes    Attending Nurse:  Trinity Bernabe RN/Staff Member:

## 2023-08-05 NOTE — PROCEDURES
"Bianca Khan is a 58 y.o. male patient.    Temp: 98.6 °F (37 °C) (08/05/23 1511)  Pulse: 81 (08/05/23 1511)  Resp: (!) 30 (08/05/23 1625)  BP: (!) 157/89 (08/05/23 1511)  SpO2: 99 % (08/05/23 1511)  Weight: 86.2 kg (190 lb) (07/04/23 1114)  Height: 5' 9.69" (177 cm) (07/24/23 1048)       Procedures    8/5/2023    [facility]  Bronchoscopy Procedure Note    SUMMARY     Date of Procedure: 8/5/2023    Procedure: Bronchoscopy, Diagnostic  Bronchoscopy, Therapeutic  Bronchoalveolar lavage, BAL    Performed by: Luis Mcclure MD    Pre-Procedure Diagnosis:  Acute hypoxemic respiratory failure with mucus plugging    Post-Procedure Diagnosis:  Same    Anesthesia: General Anesthesia        Description of the Findings of the Procedure:     Patient was not consented for the procedure as this was deemed an emergent procedure due to the degree of hypoxia due to recurrent mucus plugging.  A total of 7 cc of 1% lidocaine was instilled into the endotracheal tube.  The bronchocope was inserted into the main airway via the endotracheal tube. An anatomical survey was done of the main airways and the subsegmental bronchus.  The distal tip of the endotracheal tube was visualized to be above the marija with some surrounding sub endotracheal tube concretions/biofilm.  The distal trachea was visualized to be widely patent without any signs of mucosal abnormalities or obstructions.  The marija was visualized to be sharp without any signs of mucosal abnormalities or obstructions.  The right mainstem bronchus was visualized to be completely occluded with whitish/flor colored tenacious mucus.  The bronchoscope was placed into the right mainstem bronchus were vigorous suctioning was performed with removal of mucus plugging right mainstem bronchus and further visualization and removal of mucus plugging in the right upper lobe bronchus intermedius right middle lobe and all the segments of the right lower lobe.  After vigorous " suctioning was performed to remove mucus plugging repeat visualization of the right lung was performed demonstrating widely patent right mainstem bronchus which demonstrated no endobronchial abnormalities or obstructions and all subsegments of the right upper lobe right middle lobe bronchus intermedius and right middle lobe demonstrated widely patent airways without any signs of mucosal abnormalities or obstructions.  Survey of the left mainstem bronchus was performed demonstrating loose mucus secretions in the lumen of the left mainstem bronchus as well as the left upper lobe lingula and left lower lobe.  There is less complete occlusion of the segments of the left lung which all the secretions/mucus was adequately suctioned with survey of the left upper lobe lingula left lower lobe and left mainstem bronchus all demonstrating widely patent airways without any signs of mucosal abnormalities or obstructions.  The bronchoscope was withdrawn to the level of the trachea and no signs of active bleeding was visualized prior to withdrawing the bronchoscope.  The patient's endotracheal tube is placed back on closer kit mechanical ventilation.  Peep was decreased to 5 and FiO2 dropped to 40% with the patient's O2 saturation remaining at 100% per monitor.  Overall status is markedly improved compared to start of the procedure.      Complications: None; patient tolerated the procedure well.    Estimated Blood Loss (EBL): Minimal           Specimens: None       Condition: stable        Disposition: ICU - extubated and stable.    Luis Mcclure MD  [facility]

## 2023-08-05 NOTE — PROGRESS NOTES
[facility] - [facility]  Pulmonary Critical Care Note    Patient Name: Bianca Khan  MRN: [id number]  Admission Date: 6/28/2023  Hospital Length of Stay: 38 days  Code Status: Full Code  Attending Provider: Franco Alfredo MD  Primary Care Provider: Areli Vargas PA-C     Subjective:     HPI:   Bianca Khan is a 58 y.o. male with PMH of paraplegia 2/2 spinal cord injury (T1-T6), neurogenic bladder with suprapubic catheter, chronic right ankle osteomyelitis, DM II, HTN, who presented to the ED on 6/28/2023 with CC of right knee pain. Per chart review, CT of right knee revealed 5 cm area of subcutaneous fluid in prepatellar region which was aspirated in the ED; he was taken to the OR on 6/29/2023 by orthopedic surgery team and was found to have prepatellar bursa infection and septic arthritis. Wound culture 6/29/2023 positive for strep agalactiae. Orthopedic surgery team recommended right-sided above-knee amputation d/t chronically infected hardware in right lower extremity. On 7/4/2023, a RRT was called d/t acute respiratory distress that was not improving despite being placed on vapotherm at 35 L/min with FiO2 100%. At the time of examination, patient's initial GCS was 10 but progressively reduced to a GCS of 6. Shared decision with patient's wife was made to intubate patient for airway protection though ABGs were within normal limits. He was admitted to the ICU for close monitoring and further medical management.  The patient underwent incision and drainage of his right knee on 06/29.  He was admitted to the ICU and intubated for airway protection due to altered mental status on 7/4 with placement of hemodialysis catheter with initiation of hemodialysis.  Patient was subsequently extubated on 07/18 for the 2nd time.  He was reintubated on 07/21 secondary to worsening hypoxia and altered mental status with associated mucus plugging.  Status post bronchoscopy x3 since admission to the  ICU for hypoxia associated with mucus plugging.  He has been off of hemodialysis for several days now.      24 Hour Interval History:  Still pending tracheostomy and PEG tube placement.Per report, wife has signed consent forms and there is tentative plans for bedside trach on Sunday. Patient remains afebrile and no leukocytosis. Renal function stable with urine output of 900 ml since overnight. He is sedated with Precedex at 1.4 mcg/kg and Propofol at 50 mcg/kg. His oxygen saturation is doing well with current ventilator setting.     ROS unobtainable 2/2 intubation, sedation.       Past Medical History:   Diagnosis Date    Arthritis     Chronic ulcer of ankle 05/26/2022    Frequent UTI 07/02/2019    Generalized anxiety disorder 05/26/2022    Neurogenic bladder 05/26/2022    Osteomyelitis 05/26/2022    Presence of suprapubic catheter 05/26/2022    Pure hypercholesterolemia 05/26/2022    Retention of urine, unspecified 08/09/2019    Spinal cord injury at T1-T6 level 04/20/2018     Past Surgical History:   Procedure Laterality Date    INCISION AND DRAINAGE, LOWER EXTREMITY Right 6/29/2023    Procedure: INCISION AND DRAINAGE, LOWER EXTREMITY;  Surgeon: Prabhu Shen DO;  Location: [facility];  Service: Orthopedics;  Laterality: Right;  supine bone foam wash stuff cultures    INSERTION OF INTRAMEDULLARY MARISA Right 8/10/2022    Procedure: INSERTION, INTRAMEDULLARY MARISA RIGHT TIBIA;  Surgeon: Jorge Orellana MD;  Location: [facility];  Service: Orthopedics;  Laterality: Right;     Social History     Socioeconomic History    Marital status:    Tobacco Use    Smoking status: Every Day     Current packs/day: 0.00     Types: Cigars, Cigarettes    Smokeless tobacco: Never   Substance and Sexual Activity    Alcohol use: Yes     Alcohol/week: 2.0 standard drinks of alcohol     Types: 2 Cans of beer per week    Drug use: Not Currently    Sexual activity: Never     Current Outpatient Medications   Medication Instructions     amitriptyline (ELAVIL) 50 mg, Oral, Nightly    amLODIPine (NORVASC) 10 MG tablet 1 tablet    atorvastatin (LIPITOR) 20 mg, Oral, Nightly    busPIRone (BUSPAR) 5 MG Tab 1 tablet    carvediloL (COREG) 12.5 mg, Oral    cyclobenzaprine (FLEXERIL) 10 mg, Oral, 2 times daily PRN    doxycycline (VIBRAMYCIN) 100 mg, Oral, Daily    fenofibrate 160 mg, Oral    FLUoxetine 20 mg, Oral    gabapentin (NEURONTIN) 300 MG capsule 1 capsule    lisinopriL (PRINIVIL,ZESTRIL) 10 mg, Oral, Nightly    lisinopriL 10 mg, Oral, Daily    LORazepam (ATIVAN) 1 mg, Oral, 2 times daily    melatonin (MELATIN) 3 mg tablet TAKE TWO TABLETS BY MOUTH EVERY NIGHT AT BEDTIME AS NEEDED FOR INSOMNIA.    meloxicam (MOBIC) 15 mg, Oral, Daily    metFORMIN (GLUCOPHAGE) 500 MG tablet SMARTSI Tablet(s) By Mouth Morning-Evening    oxybutynin (DITROPAN) 5 mg, Oral, 2 times daily    oxyCODONE-acetaminophen (PERCOCET)  mg per tablet 1 tablet, Oral, Every 6 hours PRN    pantoprazole (PROTONIX) 40 MG tablet 1 tablet    temazepam (RESTORIL) 30 mg, Oral, Nightly    traZODone (DESYREL) 50 mg, Oral, Nightly    XARELTO 20 mg Tab SMARTSI Tablet(s) By Mouth Every Evening     Current Inpatient Medications   albuterol-ipratropium  3 mL Nebulization BID    amLODIPine  10 mg Per NG tube Daily    atorvastatin  20 mg Per NG tube Nightly    carvediloL  25 mg Per NG tube BID    cefTRIAXone (ROCEPHIN) IVPB  2 g Intravenous Q24H    cloNIDine 0.2 mg/24 hr td ptwk  1 patch Transdermal Q7 Days    doxycycline  100 mg Per NG tube Q12H    enoxaparin  30 mg Subcutaneous Q12H    famotidine (PF)  20 mg Intravenous Daily    fenofibrate  48 mg Per NG tube Daily    guaiFENesin 100 mg/5 ml  400 mg Per NG tube Q4H    miconazole NITRATE 2 %   Topical (Top) BID    oxybutynin  5 mg Per NG tube BID    senna-docusate 8.6-50 mg  2 tablet Per NG tube BID    sodium chloride 3%  4 mL Nebulization BID    sodium citrate-citric acid 500-334 mg/5 ml  30 mL Oral Once     Current Intravenous  Infusions   clevidipine Stopped (07/31/23 2000)    dexmedeTOMIDine (Precedex) infusion (titrating) 1.4 mcg/kg/hr (08/05/23 0407)    NORepinephrine bitartrate-D5W Stopped (07/12/23 1039)    propofoL 50 mcg/kg/min (08/05/23 0243)       Objective:       Intake/Output Summary (Last 24 hours) at 8/5/2023 0451  Last data filed at 8/5/2023 0400  Gross per 24 hour   Intake 3651.03 ml   Output 2525 ml   Net 1126.03 ml       Vital Signs (Most Recent):  Temp: 98.2 °F (36.8 °C) (08/05/23 0400)  Pulse: 86 (08/05/23 0230)  Resp: (!) 30 (08/05/23 0230)  BP: (!) 169/89 (08/05/23 0230)  SpO2: (!) 94 % (08/05/23 0230)  Body mass index is 27.51 kg/m².  Weight: 86.2 kg (190 lb) Vital Signs (24h Range):  Temp:  [98 °F (36.7 °C)-100.1 °F (37.8 °C)] 98.2 °F (36.8 °C)  Pulse:  [] 86  Resp:  [16-37] 30  SpO2:  [86 %-100 %] 94 %  BP: (127-178)/() 169/89         Physical Exam  General:  no acute respiratory distress  Head: Normocephalic, atraumatic  Eyes: PERRL, EOMI, anicteric sclera  Neck: supple  CVS:  RRR, S1 and S2 auscultated and 2+ peripheral pulses  Resp: coarse breath sounds bilaterally, decreased breath sound left base, no wheezes or rales  GI:  Abdomen soft, non-distended, normoactive bowel sounds. Suprapubic catheter in place.  MSK: Wound VAC right knee  Skin: No rashes, ulcers, erythema  Neuro: Cough, gag, cornea and pupillary reflex intact; sedated on propofol and precedex.         Lines/Drains/Airways       Drain  Duration                  NG/OG Tube 07/21/23 1000 [place] sump 16 Fr. Left nostril 14 days         Suprapubic Catheter 07/31/23 1410 22 Fr. 4 days              Airway  Duration                  Airway - Non-Surgical 07/21/23 1655 14 days              Peripheral Intravenous Line  Duration                  Midline Catheter Insertion/Assessment  - Single Lumen 07/22/23 1603 Left basilic vein (medial side of arm) other (see comments) 13 days         Peripheral IV - Single Lumen 07/27/23 2145 20 G Posterior;Left  Hand 8 days                  Significant Labs:    Lab Results   Component Value Date    WBC 10.27 08/05/2023    HGB 8.1 (L) 08/05/2023    HCT 26.1 (L) 08/05/2023    MCV 78.9 (L) 08/05/2023     (H) 08/05/2023     BMP  Lab Results   Component Value Date     08/05/2023    K 3.9 08/05/2023    CHLORIDE 105 08/05/2023    CO2 24 08/05/2023    BUN 19.7 08/05/2023    CREATININE 0.77 08/05/2023    CALCIUM 9.3 08/05/2023    AGAP 10.0 08/04/2023    ESTGFRAFRICA 101 05/11/2022    EGFRNONAA >60 04/27/2022     ABG  Recent Labs   Lab 07/30/23 0523   PH 7.450   PO2 56.0*   HCO3 31.3       Mechanical Ventilation Support:  Vent Mode: A/C (08/05/23 0405)  Ventilator Initiated: Yes (07/21/23 1655)  Set Rate: 20 BPM (08/05/23 0405)  Vt Set: 450 mL (08/05/23 0405)  Pressure Support: 12 cmH20 (08/04/23 0031)  PEEP/CPAP: 5 cmH20 (08/05/23 0405)  Oxygen Concentration (%): 50 (08/05/23 0400)  Peak Airway Pressure: 23 cmH20 (08/05/23 0405)  Total Ve: 15.1 L/m (08/05/23 0405)  F/VT Ratio<105 (RSBI): (!) 61.5 (08/04/23 1808)      Assessment/Plan:     Assessment  Septic arthritis of the right knee with Strep agalactiae s/p I&D on 06/29/2023 with wound VAC in place currently on doxycycline for suppressive therapy  Acute hypoxemic respiratory failure with associated pneumonia in addition to pulmonary edema on mechanical ventilation s/p intubation x3  ANTON with CKD 4 s/p multiple sessions HD currently not receiving hemodialysis   Hypoactive delirium   Paroxysmal a-fib with permanent pacemaker in place   Hx of paraplegia secondary to spinal cord injury at level T1 with associated neurogenic bladder and chronic right ankle osteomyelitis  DM II  HTN      Plan  -failure wean from mechanical ventilation due to altered mental status, generalized debility, poor secretion clearance-> tracheostomy and PEG tube placement pending  -consent now signed by wife, tentative plan for bedside tracheostomy placement on Sunday  -continue wound VAC per  orthopedic surgery recommendations, they have recommended amputation this hospital stay but patient has refused  -infectious disease service following, on ceftriaxone with tentative end date 08/09/2023, with chronic suppressive doxycycline to continue indefinitely  -oxygen saturations stable at 94-95% at 50 % FiO2    DVT Prophylaxis: Lovenox  GI Prophylaxis: Pepcid         Carlos Qiuntana MD PGY-1  Pulmonary Disease and Critical Care Medicine  [facility] - [facility]

## 2023-08-06 LAB
ALBUMIN SERPL-MCNC: 2.1 G/DL (ref 3.5–5)
ALBUMIN/GLOB SERPL: 0.4 RATIO (ref 1.1–2)
ALP SERPL-CCNC: 70 UNIT/L (ref 40–150)
ALT SERPL-CCNC: 16 UNIT/L (ref 0–55)
AST SERPL-CCNC: 15 UNIT/L (ref 5–34)
BASOPHILS # BLD AUTO: 0.02 X10(3)/MCL
BASOPHILS NFR BLD AUTO: 0.2 %
BILIRUBIN DIRECT+TOT PNL SERPL-MCNC: 0.3 MG/DL
BUN SERPL-MCNC: 20 MG/DL (ref 8.4–25.7)
CALCIUM SERPL-MCNC: 9.4 MG/DL (ref 8.4–10.2)
CHLORIDE SERPL-SCNC: 102 MMOL/L (ref 98–107)
CO2 SERPL-SCNC: 24 MMOL/L (ref 22–29)
CREAT SERPL-MCNC: 0.66 MG/DL (ref 0.73–1.18)
EOSINOPHIL # BLD AUTO: 0.08 X10(3)/MCL (ref 0–0.9)
EOSINOPHIL NFR BLD AUTO: 1 %
ERYTHROCYTE [DISTWIDTH] IN BLOOD BY AUTOMATED COUNT: 21.1 % (ref 11.5–17)
FUNGUS SPEC CULT: NORMAL
GFR SERPLBLD CREATININE-BSD FMLA CKD-EPI: >60 MLS/MIN/1.73/M2
GLOBULIN SER-MCNC: 5.3 GM/DL (ref 2.4–3.5)
GLUCOSE SERPL-MCNC: 166 MG/DL (ref 74–100)
HCT VFR BLD AUTO: 26.5 % (ref 42–52)
HGB BLD-MCNC: 8.1 G/DL (ref 14–18)
IMM GRANULOCYTES # BLD AUTO: 0.05 X10(3)/MCL (ref 0–0.04)
IMM GRANULOCYTES NFR BLD AUTO: 0.6 %
LYMPHOCYTES # BLD AUTO: 2.26 X10(3)/MCL (ref 0.6–4.6)
LYMPHOCYTES NFR BLD AUTO: 26.9 %
MCH RBC QN AUTO: 24.4 PG (ref 27–31)
MCHC RBC AUTO-ENTMCNC: 30.6 G/DL (ref 33–36)
MCV RBC AUTO: 79.8 FL (ref 80–94)
MONOCYTES # BLD AUTO: 0.78 X10(3)/MCL (ref 0.1–1.3)
MONOCYTES NFR BLD AUTO: 9.3 %
NEUTROPHILS # BLD AUTO: 5.22 X10(3)/MCL (ref 2.1–9.2)
NEUTROPHILS NFR BLD AUTO: 62 %
NRBC BLD AUTO-RTO: 0 %
PLATELET # BLD AUTO: 492 X10(3)/MCL (ref 130–400)
PMV BLD AUTO: 9.5 FL (ref 7.4–10.4)
POTASSIUM SERPL-SCNC: 3.9 MMOL/L (ref 3.5–5.1)
PROT SERPL-MCNC: 7.4 GM/DL (ref 6.4–8.3)
RBC # BLD AUTO: 3.32 X10(6)/MCL (ref 4.7–6.1)
SODIUM SERPL-SCNC: 138 MMOL/L (ref 136–145)
WBC # SPEC AUTO: 8.41 X10(3)/MCL (ref 4.5–11.5)

## 2023-08-06 PROCEDURE — 63600175 PHARM REV CODE 636 W HCPCS: Performed by: INTERNAL MEDICINE

## 2023-08-06 PROCEDURE — 27000221 HC OXYGEN, UP TO 24 HOURS

## 2023-08-06 PROCEDURE — 99900022

## 2023-08-06 PROCEDURE — 94761 N-INVAS EAR/PLS OXIMETRY MLT: CPT

## 2023-08-06 PROCEDURE — 63600175 PHARM REV CODE 636 W HCPCS: Performed by: SURGERY

## 2023-08-06 PROCEDURE — 25000003 PHARM REV CODE 250: Performed by: SURGERY

## 2023-08-06 PROCEDURE — 94640 AIRWAY INHALATION TREATMENT: CPT

## 2023-08-06 PROCEDURE — 99900035 HC TECH TIME PER 15 MIN (STAT)

## 2023-08-06 PROCEDURE — 25000242 PHARM REV CODE 250 ALT 637 W/ HCPCS: Performed by: INTERNAL MEDICINE

## 2023-08-06 PROCEDURE — 31622 DX BRONCHOSCOPE/WASH: CPT

## 2023-08-06 PROCEDURE — 27100171 HC OXYGEN HIGH FLOW UP TO 24 HOURS

## 2023-08-06 PROCEDURE — 85025 COMPLETE CBC W/AUTO DIFF WBC: CPT | Performed by: STUDENT IN AN ORGANIZED HEALTH CARE EDUCATION/TRAINING PROGRAM

## 2023-08-06 PROCEDURE — 63600175 PHARM REV CODE 636 W HCPCS

## 2023-08-06 PROCEDURE — 25000003 PHARM REV CODE 250

## 2023-08-06 PROCEDURE — 31615 TRCHEOBRNCHSC EST TRACHS INC: CPT

## 2023-08-06 PROCEDURE — 99900025 HC BRONCHOSCOPY-ASST (STAT)

## 2023-08-06 PROCEDURE — 43246 EGD PLACE GASTROSTOMY TUBE: CPT

## 2023-08-06 PROCEDURE — 99900026 HC AIRWAY MAINTENANCE (STAT)

## 2023-08-06 PROCEDURE — 25000003 PHARM REV CODE 250: Performed by: INTERNAL MEDICINE

## 2023-08-06 PROCEDURE — 80053 COMPREHEN METABOLIC PANEL: CPT | Performed by: STUDENT IN AN ORGANIZED HEALTH CARE EDUCATION/TRAINING PROGRAM

## 2023-08-06 PROCEDURE — 25000003 PHARM REV CODE 250: Performed by: STUDENT IN AN ORGANIZED HEALTH CARE EDUCATION/TRAINING PROGRAM

## 2023-08-06 PROCEDURE — 20000000 HC ICU ROOM

## 2023-08-06 PROCEDURE — 94003 VENT MGMT INPAT SUBQ DAY: CPT

## 2023-08-06 PROCEDURE — 27200966 HC CLOSED SUCTION SYSTEM

## 2023-08-06 RX ORDER — MIDAZOLAM HYDROCHLORIDE 1 MG/ML
4 INJECTION INTRAMUSCULAR; INTRAVENOUS ONCE
Status: DISCONTINUED | OUTPATIENT
Start: 2023-08-06 | End: 2023-08-06

## 2023-08-06 RX ORDER — MIDAZOLAM HYDROCHLORIDE 1 MG/ML
2 INJECTION INTRAMUSCULAR; INTRAVENOUS ONCE
Status: COMPLETED | OUTPATIENT
Start: 2023-08-06 | End: 2023-08-06

## 2023-08-06 RX ORDER — ROCURONIUM BROMIDE 10 MG/ML
100 INJECTION, SOLUTION INTRAVENOUS ONCE
Status: COMPLETED | OUTPATIENT
Start: 2023-08-06 | End: 2023-08-06

## 2023-08-06 RX ORDER — MIDAZOLAM HYDROCHLORIDE 1 MG/ML
4 INJECTION INTRAMUSCULAR; INTRAVENOUS ONCE
Status: COMPLETED | OUTPATIENT
Start: 2023-08-06 | End: 2023-08-06

## 2023-08-06 RX ADMIN — ATORVASTATIN CALCIUM 20 MG: 10 TABLET, FILM COATED ORAL at 08:08

## 2023-08-06 RX ADMIN — FENOFIBRATE 48 MG: 48 TABLET, FILM COATED ORAL at 09:08

## 2023-08-06 RX ADMIN — CARVEDILOL 25 MG: 12.5 TABLET, FILM COATED ORAL at 08:08

## 2023-08-06 RX ADMIN — DEXMEDETOMIDINE HYDROCHLORIDE 1.4 MCG/KG/HR: 400 INJECTION INTRAVENOUS at 04:08

## 2023-08-06 RX ADMIN — SODIUM CHLORIDE SOLN NEBU 3% 4 ML: 3 NEBU SOLN at 02:08

## 2023-08-06 RX ADMIN — DEXMEDETOMIDINE HYDROCHLORIDE 1.4 MCG/KG/HR: 400 INJECTION INTRAVENOUS at 02:08

## 2023-08-06 RX ADMIN — DEXMEDETOMIDINE HYDROCHLORIDE 1.4 MCG/KG/HR: 400 INJECTION INTRAVENOUS at 10:08

## 2023-08-06 RX ADMIN — IPRATROPIUM BROMIDE AND ALBUTEROL SULFATE 3 ML: 2.5; .5 SOLUTION RESPIRATORY (INHALATION) at 08:08

## 2023-08-06 RX ADMIN — PROPOFOL 50 MCG/KG/MIN: 10 INJECTION, EMULSION INTRAVENOUS at 04:08

## 2023-08-06 RX ADMIN — LABETALOL HYDROCHLORIDE 10 MG: 5 INJECTION, SOLUTION INTRAVENOUS at 07:08

## 2023-08-06 RX ADMIN — DOXYCYCLINE HYCLATE 100 MG: 100 TABLET, COATED ORAL at 08:08

## 2023-08-06 RX ADMIN — HYDRALAZINE HYDROCHLORIDE 10 MG: 20 INJECTION INTRAMUSCULAR; INTRAVENOUS at 01:08

## 2023-08-06 RX ADMIN — GUAIFENESIN 400 MG: 200 SOLUTION ORAL at 05:08

## 2023-08-06 RX ADMIN — SODIUM CHLORIDE SOLN NEBU 3% 4 ML: 3 NEBU SOLN at 08:08

## 2023-08-06 RX ADMIN — DEXMEDETOMIDINE HYDROCHLORIDE 1.4 MCG/KG/HR: 400 INJECTION INTRAVENOUS at 08:08

## 2023-08-06 RX ADMIN — MICONAZOLE NITRATE 2 % TOPICAL POWDER: at 08:08

## 2023-08-06 RX ADMIN — SENNOSIDES AND DOCUSATE SODIUM 2 TABLET: 50; 8.6 TABLET ORAL at 09:08

## 2023-08-06 RX ADMIN — MIDAZOLAM 4 MG: 1 INJECTION INTRAMUSCULAR; INTRAVENOUS at 03:08

## 2023-08-06 RX ADMIN — GUAIFENESIN 400 MG: 200 SOLUTION ORAL at 02:08

## 2023-08-06 RX ADMIN — GUAIFENESIN 400 MG: 200 SOLUTION ORAL at 06:08

## 2023-08-06 RX ADMIN — DEXMEDETOMIDINE HYDROCHLORIDE 1.4 MCG/KG/HR: 400 INJECTION INTRAVENOUS at 01:08

## 2023-08-06 RX ADMIN — PROPOFOL 50 MCG/KG/MIN: 10 INJECTION, EMULSION INTRAVENOUS at 08:08

## 2023-08-06 RX ADMIN — CEFTRIAXONE SODIUM 2 G: 2 INJECTION, POWDER, FOR SOLUTION INTRAMUSCULAR; INTRAVENOUS at 11:08

## 2023-08-06 RX ADMIN — AMLODIPINE BESYLATE 10 MG: 5 TABLET ORAL at 08:08

## 2023-08-06 RX ADMIN — MICONAZOLE NITRATE 2 % TOPICAL POWDER: at 09:08

## 2023-08-06 RX ADMIN — ROCURONIUM BROMIDE 100 MG: 10 INJECTION, SOLUTION INTRAVENOUS at 02:08

## 2023-08-06 RX ADMIN — FAMOTIDINE 20 MG: 10 INJECTION, SOLUTION INTRAVENOUS at 08:08

## 2023-08-06 RX ADMIN — OXYBUTYNIN CHLORIDE 5 MG: 5 TABLET ORAL at 08:08

## 2023-08-06 RX ADMIN — GUAIFENESIN 400 MG: 200 SOLUTION ORAL at 10:08

## 2023-08-06 RX ADMIN — ENOXAPARIN SODIUM 30 MG: 30 INJECTION SUBCUTANEOUS at 08:08

## 2023-08-06 RX ADMIN — PROPOFOL 50 MCG/KG/MIN: 10 INJECTION, EMULSION INTRAVENOUS at 10:08

## 2023-08-06 RX ADMIN — GUAIFENESIN 400 MG: 200 SOLUTION ORAL at 01:08

## 2023-08-06 RX ADMIN — HYDRALAZINE HYDROCHLORIDE 10 MG: 20 INJECTION INTRAMUSCULAR; INTRAVENOUS at 02:08

## 2023-08-06 RX ADMIN — MIDAZOLAM 2 MG: 1 INJECTION INTRAMUSCULAR; INTRAVENOUS at 03:08

## 2023-08-06 NOTE — PROGRESS NOTES
[facility] - [facility]  Pulmonary Critical Care Note    Patient Name: Bianca Khan  MRN: [id number]  Admission Date: 6/28/2023  Hospital Length of Stay: 39 days  Code Status: Full Code  Attending Provider: Luis Mcclure MD  Primary Care Provider: Areli Vargas PA-C     Subjective:     HPI:   Bianca Khan is a 58 y.o. male with PMH of paraplegia 2/2 spinal cord injury (T1-T6), neurogenic bladder with suprapubic catheter, chronic right ankle osteomyelitis, DM II, HTN, who presented to the ED on 6/28/2023 with CC of right knee pain. Per chart review, CT of right knee revealed 5 cm area of subcutaneous fluid in prepatellar region which was aspirated in the ED; he was taken to the OR on 6/29/2023 by orthopedic surgery team and was found to have prepatellar bursa infection and septic arthritis. Wound culture 6/29/2023 positive for strep agalactiae. Orthopedic surgery team recommended right-sided above-knee amputation d/t chronically infected hardware in right lower extremity. On 7/4/2023, a RRT was called d/t acute respiratory distress that was not improving despite being placed on vapotherm at 35 L/min with FiO2 100%. At the time of examination, patient's initial GCS was 10 but progressively reduced to a GCS of 6. Shared decision with patient's wife was made to intubate patient for airway protection though ABGs were within normal limits. He was admitted to the ICU for close monitoring and further medical management.  The patient underwent incision and drainage of his right knee on 06/29.  He was admitted to the ICU and intubated for airway protection due to altered mental status on 7/4 with placement of hemodialysis catheter with initiation of hemodialysis.  Patient was subsequently extubated on 07/18 for the 2nd time.  He was reintubated on 07/21 secondary to worsening hypoxia and altered mental status with associated mucus plugging.  Status post bronchoscopy x4 since admission to the ICU  for hypoxia associated with mucus plugging.  He has been off of hemodialysis for several days now.      24 Hour Interval History:  Still pending tracheostomy and PEG tube placement. Per report, wife has signed consent forms and there is tentative plans for bedside trach in near future. Required bronch on 8/5/2023 for mucus plugging.     ROS: unobtainable 2/2 intubation, sedation.       Past Medical History:   Diagnosis Date    Arthritis     Chronic ulcer of ankle 05/26/2022    Frequent UTI 07/02/2019    Generalized anxiety disorder 05/26/2022    Neurogenic bladder 05/26/2022    Osteomyelitis 05/26/2022    Presence of suprapubic catheter 05/26/2022    Pure hypercholesterolemia 05/26/2022    Retention of urine, unspecified 08/09/2019    Spinal cord injury at T1-T6 level 04/20/2018     Past Surgical History:   Procedure Laterality Date    INCISION AND DRAINAGE, LOWER EXTREMITY Right 6/29/2023    Procedure: INCISION AND DRAINAGE, LOWER EXTREMITY;  Surgeon: Prabhu Shen DO;  Location: [facility];  Service: Orthopedics;  Laterality: Right;  supine bone foam wash stuff cultures    INSERTION OF INTRAMEDULLARY MARISA Right 8/10/2022    Procedure: INSERTION, INTRAMEDULLARY MARISA RIGHT TIBIA;  Surgeon: Jorge Orellana MD;  Location: [facility] OR;  Service: Orthopedics;  Laterality: Right;     Social History     Socioeconomic History    Marital status:    Tobacco Use    Smoking status: Every Day     Current packs/day: 0.00     Types: Cigars, Cigarettes    Smokeless tobacco: Never   Substance and Sexual Activity    Alcohol use: Yes     Alcohol/week: 2.0 standard drinks of alcohol     Types: 2 Cans of beer per week    Drug use: Not Currently    Sexual activity: Never     Current Outpatient Medications   Medication Instructions    amitriptyline (ELAVIL) 50 mg, Oral, Nightly    amLODIPine (NORVASC) 10 MG tablet 1 tablet    atorvastatin (LIPITOR) 20 mg, Oral, Nightly    busPIRone (BUSPAR) 5 MG Tab 1 tablet    carvediloL (COREG) 12.5 mg,  Oral    cyclobenzaprine (FLEXERIL) 10 mg, Oral, 2 times daily PRN    doxycycline (VIBRAMYCIN) 100 mg, Oral, Daily    fenofibrate 160 mg, Oral    FLUoxetine 20 mg, Oral    gabapentin (NEURONTIN) 300 MG capsule 1 capsule    lisinopriL (PRINIVIL,ZESTRIL) 10 mg, Oral, Nightly    lisinopriL 10 mg, Oral, Daily    LORazepam (ATIVAN) 1 mg, Oral, 2 times daily    melatonin (MELATIN) 3 mg tablet TAKE TWO TABLETS BY MOUTH EVERY NIGHT AT BEDTIME AS NEEDED FOR INSOMNIA.    meloxicam (MOBIC) 15 mg, Oral, Daily    metFORMIN (GLUCOPHAGE) 500 MG tablet SMARTSI Tablet(s) By Mouth Morning-Evening    oxybutynin (DITROPAN) 5 mg, Oral, 2 times daily    oxyCODONE-acetaminophen (PERCOCET)  mg per tablet 1 tablet, Oral, Every 6 hours PRN    pantoprazole (PROTONIX) 40 MG tablet 1 tablet    temazepam (RESTORIL) 30 mg, Oral, Nightly    traZODone (DESYREL) 50 mg, Oral, Nightly    XARELTO 20 mg Tab SMARTSI Tablet(s) By Mouth Every Evening     Current Inpatient Medications   albuterol-ipratropium  3 mL Nebulization BID    amLODIPine  10 mg Per NG tube Daily    atorvastatin  20 mg Per NG tube Nightly    carvediloL  25 mg Per NG tube BID    cefTRIAXone (ROCEPHIN) IVPB  2 g Intravenous Q24H    cloNIDine 0.2 mg/24 hr td ptwk  1 patch Transdermal Q7 Days    doxycycline  100 mg Per NG tube Q12H    enoxaparin  30 mg Subcutaneous Q12H    famotidine (PF)  20 mg Intravenous Daily    fenofibrate  48 mg Per NG tube Daily    guaiFENesin 100 mg/5 ml  400 mg Per NG tube Q4H    miconazole NITRATE 2 %   Topical (Top) BID    oxybutynin  5 mg Per NG tube BID    senna-docusate 8.6-50 mg  2 tablet Per NG tube BID    sodium chloride 3%  4 mL Nebulization Q6H    sodium citrate-citric acid 500-334 mg/5 ml  30 mL Oral Once     Current Intravenous Infusions   dexmedeTOMIDine (Precedex) infusion (titrating) 1.4 mcg/kg/hr (23)    propofoL 50 mcg/kg/min (08/06/23 0829)       Objective:       Intake/Output Summary (Last 24 hours) at 2023 0903  Last  data filed at 8/6/2023 0543  Gross per 24 hour   Intake 1430.23 ml   Output 2050 ml   Net -619.77 ml       Vital Signs (Most Recent):  Temp: 99.1 °F (37.3 °C) (08/06/23 0400)  Pulse: 60 (08/06/23 0809)  Resp: 20 (08/06/23 0809)  BP: (!) 156/85 (08/06/23 0100)  SpO2: 99 % (08/06/23 0809)  Body mass index is 27.51 kg/m².  Weight: 86.2 kg (190 lb) Vital Signs (24h Range):  Temp:  [98.2 °F (36.8 °C)-99.1 °F (37.3 °C)] 99.1 °F (37.3 °C)  Pulse:  [] 60  Resp:  [17-32] 20  SpO2:  [92 %-100 %] 99 %  BP: (131-178)/(71-97) 156/85       Physical Exam  General:  no acute respiratory distress  Head: Normocephalic, atraumatic  Eyes: PERRL, EOMI, anicteric sclera  Neck: supple  CVS:  RRR  Resp: coarse breath sounds bilaterally, decreased breath sound left base, no wheezes or rales  GI:  Abdomen soft, non-distended, normoactive bowel sounds. Suprapubic catheter in place.  Skin: No rashes, ulcers, erythema  Neuro: sedated on propofol and precedex.       Lines/Drains/Airways       Drain  Duration                  NG/OG Tube 07/21/23 1000 [place] sump 16 Fr. Left nostril 15 days         Suprapubic Catheter 07/31/23 1410 22 Fr. 5 days              Airway  Duration                  Airway - Non-Surgical 07/21/23 1655 15 days              Peripheral Intravenous Line  Duration                  Midline Catheter Insertion/Assessment  - Single Lumen 07/22/23 1603 Left basilic vein (medial side of arm) other (see comments) 14 days         Peripheral IV - Single Lumen 07/27/23 2145 20 G Posterior;Left Hand 9 days                  Significant Labs:    Lab Results   Component Value Date    WBC 8.41 08/06/2023    HGB 8.1 (L) 08/06/2023    HCT 26.5 (L) 08/06/2023    MCV 79.8 (L) 08/06/2023     (H) 08/06/2023     BMP  Lab Results   Component Value Date     08/06/2023    K 3.9 08/06/2023    CHLORIDE 102 08/06/2023    CO2 24 08/06/2023    BUN 20.0 08/06/2023    CREATININE 0.66 (L) 08/06/2023    CALCIUM 9.4 08/06/2023    AGAP 10.0  "08/04/2023    ESTGFRAFRICA 101 05/11/2022    EGFRNONAA >60 04/27/2022     ABG  No results for input(s): "PH", "PO2", "PCO2", "HCO3", "BE" in the last 168 hours.    Mechanical Ventilation Support:  Vent Mode: A/C (08/06/23 0531)  Ventilator Initiated: Yes (07/21/23 1655)  Set Rate: 20 BPM (08/06/23 0531)  Vt Set: 450 mL (08/06/23 0531)  Pressure Support: 12 cmH20 (08/04/23 0031)  PEEP/CPAP: 5 cmH20 (08/06/23 0531)  Oxygen Concentration (%): 30 (08/06/23 0531)  Peak Airway Pressure: 29 cmH20 (08/06/23 0531)  Total Ve: 11.1 L/m (08/06/23 0531)  F/VT Ratio<105 (RSBI): (!) 50.36 (08/05/23 2010)      Assessment/Plan:     Assessment  Septic arthritis of the right knee with Strep agalactiae s/p I&D on 06/29/2023 with wound VAC in place currently on doxycycline for suppressive therapy  Acute hypoxemic respiratory failure with associated pneumonia in addition to pulmonary edema on mechanical ventilation s/p intubation x3  ANTON with CKD 4 s/p multiple sessions HD currently not receiving hemodialysis   Hypoactive delirium   Paroxysmal a-fib with permanent pacemaker in place   Hx of paraplegia secondary to spinal cord injury at level T1 with associated neurogenic bladder and chronic right ankle osteomyelitis  DM II  HTN      Plan  -failure to wean from mechanical ventilation due to altered mental status, generalized debility, and poor secretion clearance-> tracheostomy and PEG tube placement pending  -consent now signed by wife, tentative plan for bedside tracheostomy placement in near future  -continue wound VAC per orthopedic surgery recommendations, they have recommended amputation this hospital stay but patient has refused  -infectious disease service following, on ceftriaxone with tentative end date 08/09/2023, with chronic suppressive doxycycline to continue indefinitely  -oxygen saturations stable at 94-95% at 50 % FiO2    DVT Prophylaxis: Lovenox  GI Prophylaxis: Pepcibarbara Morton, ANP PGY-1  Pulmonary Disease and " Critical Care Medicine  [facility] - [address] [place] [address]

## 2023-08-06 NOTE — OP NOTE
[facility] - 7 North ICU  General Surgery  Operative Note    SUMMARY     Date of Procedure: 8/6/2023     Procedure:   Percutaneous tracheostomy (8-0 XLT Shiley) creation   Percutaneous endoscopic gastrostomy tube insertion     Surgeon(s) and Role:  Ciro Anna MD - Surgeon   Rob Sinclair MD - Assisting surgeon   Aimee Mathis MD - PGY 4 - Resident Assisting     Pre-Operative Diagnosis:   Acute respiratory failure, unspecified whether with hypoxia or hypercapnia [J96.00]  Dysphagia   Severe protein calorie malnutrition     Post-Operative Diagnosis:   Same     Anesthesia: Propofol infusion and bolus, precedex infusion     Operative Findings (including complications, if any):   Trachea with thick scar tissue over tracheal cartilage in area of prior trach. Successful insertion of percutaneous 8-0 cuffed Shiley Tracheostomy   Successful insertion of percutaneous endoscopic gastrostomy tube. Incidentally noted hiatal hernia on EGD.     Description of Technical Procedures:   After appropriate consents were obtained, a time out was done and sedation/analgesia was administered. The anterior neck was was prepped and draped in the usual standard sterile fashion. The patient was given paralytics and sedatives to assist with the procedure. A bronchoscope was then inserted down the ETT and the tube was partially withdrawn to the subglottic area in order to visualize site of entry into the trachea. The second and third tracheal rings was palpated, and a 3 cm midline vertical incision was made at this location. Hemostats were used to bluntly dissect to the level of the trachea until the tracheal rings can be palpated. Under direct bronchoscopic visualization, an introducer needle and catheter was inserted between the second and third tracheal rings while aspirating continuously. Upon aspiration of air, the needle was removed and the catheter remained in place. A guidewire was passed through the catheter into  the trachea to the level of the marija and the catheter sheath was removed. The dilator was passed over the wire and was used to dilate trachea. The rhino was then inserted under direct visualization until the black indicator was visualized. The rhino was removed, and the Shiley tracheostomy tube with the loading dilator was passed over the wire and sheath. The loading dilator and guidewire were withdrawn while the tracheostomy tube was held in place. Trach cuff was inflated. The ETT was removed. The tracheostomy tube cuffed was sutured at 4 points with 2-0 prolene sutures. We then proceeded with a bronchoscopy. The bronchoscope was inserted down the tracheostomy tube and the marija was visualized. We then removed the bronchoscope and the ventilator circuit was connected to the tracheostomy tube and ventilation was resumed. Patient tolerated the procedure well with no immediate complications.    Next, attention was brought to the abdomen which was prepped and draped in the usual sterile fashion. The patient's abdomen was prepped and draped in sterile fashion. A scope was advanced past UES and GEJ without any difficulty, and the stomach was insufflated. Transillumination and finger compression of the stomach were used to identify location on the abdominal wall where PEG tube would be placed. A 1 cm transverse incision was made in the subxiphoid/left upper abdominal position. The needle and sheath was inserted into the stomach under direct visualization. The wire grasper was placed around needle and catheter. The needle was removed, and the sheath was left in place. The blue wire was advanced into the stomach through the catheter, and catheter was pulled back so that the wire grasper was was grasping the blue wire. The blue wire was pulled up through the oropharynx. The blue wire was attached to the PEG tube and pulled through the abdominal incision until the PEG tire was fixed against the gastric wall and the tube  spun freely in good position, at about 3 cm. The scope was advanced back into the stomach and the PEG tire was noted to be intragastric.The scope was removed from the patient and the stomach was desufflated. The external bumper was advanced on the external feeding tube portion such that the internal bumper was snug and rotated freely, the catheter was cut to appropriate length, and the cap was placed on the end of the catheter. The patient tolerated the procedure well with no issues.     Estimated Blood Loss (EBL): 10cc           Implants: * No implants in log *    Specimens:   Specimen (24h ago, onward)      None                    Condition: Good    Disposition:  remains in ICU in stable but critical condition.     Aimee Mathis MD   [employer] General Surgery PGY 4

## 2023-08-07 LAB
ALBUMIN SERPL-MCNC: 2.2 G/DL (ref 3.5–5)
ALBUMIN/GLOB SERPL: 0.4 RATIO (ref 1.1–2)
ALP SERPL-CCNC: 67 UNIT/L (ref 40–150)
ALT SERPL-CCNC: 20 UNIT/L (ref 0–55)
AST SERPL-CCNC: 25 UNIT/L (ref 5–34)
BASOPHILS # BLD AUTO: 0.02 X10(3)/MCL
BASOPHILS NFR BLD AUTO: 0.2 %
BILIRUBIN DIRECT+TOT PNL SERPL-MCNC: 0.2 MG/DL
BUN SERPL-MCNC: 14.5 MG/DL (ref 8.4–25.7)
CALCIUM SERPL-MCNC: 9 MG/DL (ref 8.4–10.2)
CHLORIDE SERPL-SCNC: 108 MMOL/L (ref 98–107)
CO2 SERPL-SCNC: 21 MMOL/L (ref 22–29)
CREAT SERPL-MCNC: 0.58 MG/DL (ref 0.73–1.18)
EOSINOPHIL # BLD AUTO: 0.08 X10(3)/MCL (ref 0–0.9)
EOSINOPHIL NFR BLD AUTO: 0.8 %
ERYTHROCYTE [DISTWIDTH] IN BLOOD BY AUTOMATED COUNT: 20.8 % (ref 11.5–17)
GFR SERPLBLD CREATININE-BSD FMLA CKD-EPI: >60 MLS/MIN/1.73/M2
GLOBULIN SER-MCNC: 5 GM/DL (ref 2.4–3.5)
GLUCOSE SERPL-MCNC: 136 MG/DL (ref 74–100)
HCT VFR BLD AUTO: 26.9 % (ref 42–52)
HGB BLD-MCNC: 8.4 G/DL (ref 14–18)
IMM GRANULOCYTES # BLD AUTO: 0.06 X10(3)/MCL (ref 0–0.04)
IMM GRANULOCYTES NFR BLD AUTO: 0.6 %
LYMPHOCYTES # BLD AUTO: 1.75 X10(3)/MCL (ref 0.6–4.6)
LYMPHOCYTES NFR BLD AUTO: 18.3 %
MCH RBC QN AUTO: 24.7 PG (ref 27–31)
MCHC RBC AUTO-ENTMCNC: 31.2 G/DL (ref 33–36)
MCV RBC AUTO: 79.1 FL (ref 80–94)
MONOCYTES # BLD AUTO: 0.96 X10(3)/MCL (ref 0.1–1.3)
MONOCYTES NFR BLD AUTO: 10 %
NEUTROPHILS # BLD AUTO: 6.71 X10(3)/MCL (ref 2.1–9.2)
NEUTROPHILS NFR BLD AUTO: 70.1 %
NRBC BLD AUTO-RTO: 0 %
PLATELET # BLD AUTO: 487 X10(3)/MCL (ref 130–400)
PMV BLD AUTO: 9.9 FL (ref 7.4–10.4)
POTASSIUM SERPL-SCNC: 4.6 MMOL/L (ref 3.5–5.1)
PROT SERPL-MCNC: 7.2 GM/DL (ref 6.4–8.3)
RBC # BLD AUTO: 3.4 X10(6)/MCL (ref 4.7–6.1)
SODIUM SERPL-SCNC: 140 MMOL/L (ref 136–145)
WBC # SPEC AUTO: 9.58 X10(3)/MCL (ref 4.5–11.5)

## 2023-08-07 PROCEDURE — 63600175 PHARM REV CODE 636 W HCPCS: Performed by: INTERNAL MEDICINE

## 2023-08-07 PROCEDURE — 25000003 PHARM REV CODE 250: Performed by: INTERNAL MEDICINE

## 2023-08-07 PROCEDURE — 63600175 PHARM REV CODE 636 W HCPCS: Performed by: STUDENT IN AN ORGANIZED HEALTH CARE EDUCATION/TRAINING PROGRAM

## 2023-08-07 PROCEDURE — 94668 MNPJ CHEST WALL SBSQ: CPT

## 2023-08-07 PROCEDURE — 99900035 HC TECH TIME PER 15 MIN (STAT)

## 2023-08-07 PROCEDURE — 25000003 PHARM REV CODE 250

## 2023-08-07 PROCEDURE — 97605 NEG PRS WND THER DME<=50SQCM: CPT

## 2023-08-07 PROCEDURE — 27000221 HC OXYGEN, UP TO 24 HOURS

## 2023-08-07 PROCEDURE — 94640 AIRWAY INHALATION TREATMENT: CPT

## 2023-08-07 PROCEDURE — 63600175 PHARM REV CODE 636 W HCPCS

## 2023-08-07 PROCEDURE — 85025 COMPLETE CBC W/AUTO DIFF WBC: CPT | Performed by: STUDENT IN AN ORGANIZED HEALTH CARE EDUCATION/TRAINING PROGRAM

## 2023-08-07 PROCEDURE — 20000000 HC ICU ROOM

## 2023-08-07 PROCEDURE — 27100171 HC OXYGEN HIGH FLOW UP TO 24 HOURS

## 2023-08-07 PROCEDURE — 25000003 PHARM REV CODE 250: Performed by: STUDENT IN AN ORGANIZED HEALTH CARE EDUCATION/TRAINING PROGRAM

## 2023-08-07 PROCEDURE — 94003 VENT MGMT INPAT SUBQ DAY: CPT

## 2023-08-07 PROCEDURE — 94761 N-INVAS EAR/PLS OXIMETRY MLT: CPT

## 2023-08-07 PROCEDURE — 25000242 PHARM REV CODE 250 ALT 637 W/ HCPCS: Performed by: INTERNAL MEDICINE

## 2023-08-07 PROCEDURE — 99900026 HC AIRWAY MAINTENANCE (STAT)

## 2023-08-07 PROCEDURE — 99900031 HC PATIENT EDUCATION (STAT)

## 2023-08-07 PROCEDURE — 80053 COMPREHEN METABOLIC PANEL: CPT | Performed by: STUDENT IN AN ORGANIZED HEALTH CARE EDUCATION/TRAINING PROGRAM

## 2023-08-07 PROCEDURE — 27200966 HC CLOSED SUCTION SYSTEM

## 2023-08-07 RX ORDER — LABETALOL HYDROCHLORIDE 5 MG/ML
10 INJECTION, SOLUTION INTRAVENOUS
Status: DISCONTINUED | OUTPATIENT
Start: 2023-08-07 | End: 2023-08-09 | Stop reason: HOSPADM

## 2023-08-07 RX ORDER — HYDRALAZINE HYDROCHLORIDE 20 MG/ML
10 INJECTION INTRAMUSCULAR; INTRAVENOUS
Status: DISCONTINUED | OUTPATIENT
Start: 2023-08-07 | End: 2023-08-09 | Stop reason: HOSPADM

## 2023-08-07 RX ORDER — HYDROMORPHONE HYDROCHLORIDE 2 MG/ML
1 INJECTION, SOLUTION INTRAMUSCULAR; INTRAVENOUS; SUBCUTANEOUS EVERY 4 HOURS PRN
Status: DISCONTINUED | OUTPATIENT
Start: 2023-08-07 | End: 2023-08-09 | Stop reason: HOSPADM

## 2023-08-07 RX ADMIN — LABETALOL HYDROCHLORIDE 10 MG: 5 INJECTION, SOLUTION INTRAVENOUS at 09:08

## 2023-08-07 RX ADMIN — PROPOFOL 50 MCG/KG/MIN: 10 INJECTION, EMULSION INTRAVENOUS at 02:08

## 2023-08-07 RX ADMIN — GUAIFENESIN 400 MG: 200 SOLUTION ORAL at 09:08

## 2023-08-07 RX ADMIN — CARVEDILOL 25 MG: 12.5 TABLET, FILM COATED ORAL at 08:08

## 2023-08-07 RX ADMIN — OXYBUTYNIN CHLORIDE 5 MG: 5 TABLET ORAL at 08:08

## 2023-08-07 RX ADMIN — OXYCODONE AND ACETAMINOPHEN 1 TABLET: 10; 325 TABLET ORAL at 08:08

## 2023-08-07 RX ADMIN — GUAIFENESIN 400 MG: 200 SOLUTION ORAL at 02:08

## 2023-08-07 RX ADMIN — SODIUM CHLORIDE SOLN NEBU 3% 4 ML: 3 NEBU SOLN at 09:08

## 2023-08-07 RX ADMIN — CEFTRIAXONE SODIUM 2 G: 2 INJECTION, POWDER, FOR SOLUTION INTRAMUSCULAR; INTRAVENOUS at 11:08

## 2023-08-07 RX ADMIN — DEXMEDETOMIDINE HYDROCHLORIDE 1.4 MCG/KG/HR: 400 INJECTION INTRAVENOUS at 10:08

## 2023-08-07 RX ADMIN — DEXMEDETOMIDINE HYDROCHLORIDE 1.4 MCG/KG/HR: 400 INJECTION INTRAVENOUS at 02:08

## 2023-08-07 RX ADMIN — HYDROMORPHONE HYDROCHLORIDE 1 MG: 2 INJECTION INTRAMUSCULAR; INTRAVENOUS; SUBCUTANEOUS at 10:08

## 2023-08-07 RX ADMIN — DOXYCYCLINE HYCLATE 100 MG: 100 TABLET, COATED ORAL at 08:08

## 2023-08-07 RX ADMIN — FAMOTIDINE 20 MG: 10 INJECTION, SOLUTION INTRAVENOUS at 08:08

## 2023-08-07 RX ADMIN — HYDRALAZINE HYDROCHLORIDE 10 MG: 20 INJECTION INTRAMUSCULAR; INTRAVENOUS at 01:08

## 2023-08-07 RX ADMIN — SENNOSIDES AND DOCUSATE SODIUM 2 TABLET: 50; 8.6 TABLET ORAL at 08:08

## 2023-08-07 RX ADMIN — PROPOFOL 40 MCG/KG/MIN: 10 INJECTION, EMULSION INTRAVENOUS at 05:08

## 2023-08-07 RX ADMIN — HYDROMORPHONE HYDROCHLORIDE 1 MG: 2 INJECTION INTRAMUSCULAR; INTRAVENOUS; SUBCUTANEOUS at 11:08

## 2023-08-07 RX ADMIN — ATORVASTATIN CALCIUM 20 MG: 10 TABLET, FILM COATED ORAL at 08:08

## 2023-08-07 RX ADMIN — MICONAZOLE NITRATE 2 % TOPICAL POWDER: at 08:08

## 2023-08-07 RX ADMIN — SODIUM CHLORIDE SOLN NEBU 3% 4 ML: 3 NEBU SOLN at 02:08

## 2023-08-07 RX ADMIN — HYDRALAZINE HYDROCHLORIDE 10 MG: 20 INJECTION INTRAMUSCULAR; INTRAVENOUS at 10:08

## 2023-08-07 RX ADMIN — ENOXAPARIN SODIUM 30 MG: 30 INJECTION SUBCUTANEOUS at 08:08

## 2023-08-07 RX ADMIN — GUAIFENESIN 400 MG: 200 SOLUTION ORAL at 05:08

## 2023-08-07 RX ADMIN — AMLODIPINE BESYLATE 10 MG: 5 TABLET ORAL at 08:08

## 2023-08-07 RX ADMIN — IPRATROPIUM BROMIDE AND ALBUTEROL SULFATE 3 ML: 2.5; .5 SOLUTION RESPIRATORY (INHALATION) at 07:08

## 2023-08-07 RX ADMIN — DEXMEDETOMIDINE HYDROCHLORIDE 1.4 MCG/KG/HR: 400 INJECTION INTRAVENOUS at 09:08

## 2023-08-07 RX ADMIN — FENOFIBRATE 48 MG: 48 TABLET, FILM COATED ORAL at 08:08

## 2023-08-07 RX ADMIN — SODIUM CHLORIDE SOLN NEBU 3% 4 ML: 3 NEBU SOLN at 12:08

## 2023-08-07 RX ADMIN — HYDRALAZINE HYDROCHLORIDE 10 MG: 20 INJECTION INTRAMUSCULAR; INTRAVENOUS at 06:08

## 2023-08-07 RX ADMIN — DEXMEDETOMIDINE HYDROCHLORIDE 1.4 MCG/KG/HR: 400 INJECTION INTRAVENOUS at 08:08

## 2023-08-07 RX ADMIN — DEXMEDETOMIDINE HYDROCHLORIDE 1 MCG/KG/HR: 400 INJECTION INTRAVENOUS at 05:08

## 2023-08-07 RX ADMIN — OXYCODONE AND ACETAMINOPHEN 1 TABLET: 10; 325 TABLET ORAL at 02:08

## 2023-08-07 RX ADMIN — DEXMEDETOMIDINE HYDROCHLORIDE 1.4 MCG/KG/HR: 400 INJECTION INTRAVENOUS at 05:08

## 2023-08-07 RX ADMIN — PROPOFOL 30 MCG/KG/MIN: 10 INJECTION, EMULSION INTRAVENOUS at 08:08

## 2023-08-07 RX ADMIN — SODIUM CHLORIDE SOLN NEBU 3% 4 ML: 3 NEBU SOLN at 07:08

## 2023-08-07 RX ADMIN — IPRATROPIUM BROMIDE AND ALBUTEROL SULFATE 3 ML: 2.5; .5 SOLUTION RESPIRATORY (INHALATION) at 09:08

## 2023-08-07 NOTE — PROGRESS NOTES
Infectious Diseases Progress Note  58-year-old male with past medical history of T-spine cord injury with paraplegia, diabetes, HTN, hepatitis-C, chronic right ankle wound/osteomyelitis, was on suppressive doxycycline, known to my service, seen by us initially at [facility] and has followed with us at the office for chronic osteomyelitis, is admitted to [facility] on 06/28/2023 presenting through the ED with complaints of pain and swelling to his right knee, apparently struck his knee.  He did also report prior remote right knee surgery.  He was evaluated and noted to have no fevers initially but a temperature of 100.2° today 6/29, no leukocytosis but with thrombocytosis of up to 531 today.  .2 and ESR >130.  He is anemic with low albumin.  Blood cultures have been negative.  CT scan of the right knee noted a 5 cm area of subcutaneous fluid collection in the prepatellar region.  There was aspiration in the ER with findings of purulent fluid and cultures showing group B Streptococcus.  He was seen by the orthopedic surgery team with findings of right prepatellar bursa abscess and is status post incision and drainage of right knee septic arthritis and right prepatellar bursa abscess today 6/29 with group B Streptococcus  He is on ceftriaxone.    Subjective:  Status post tracheostomy and PEG tube placement today.  Remains in the ICU on ventilator.  No fevers, doing about the same otherwise.  Lying in bed in no acute distress      Past Medical History:   Diagnosis Date    Arthritis     Chronic ulcer of ankle 05/26/2022    Frequent UTI 07/02/2019    Generalized anxiety disorder 05/26/2022    Neurogenic bladder 05/26/2022    Osteomyelitis 05/26/2022    Presence of suprapubic catheter 05/26/2022    Pure hypercholesterolemia 05/26/2022    Retention of urine, unspecified 08/09/2019    Spinal cord injury at T1-T6 level 04/20/2018     Past Surgical History:   Procedure Laterality  Date    INCISION AND DRAINAGE, LOWER EXTREMITY Right 6/29/2023    Procedure: INCISION AND DRAINAGE, LOWER EXTREMITY;  Surgeon: Prabhu Shen DO;  Location: [facility] OR;  Service: Orthopedics;  Laterality: Right;  supine bone foam wash stuff cultures    INSERTION OF INTRAMEDULLARY MARISA Right 8/10/2022    Procedure: INSERTION, INTRAMEDULLARY MARISA RIGHT TIBIA;  Surgeon: Jorge Orellana MD;  Location: [facility] OR;  Service: Orthopedics;  Laterality: Right;     Social History     Socioeconomic History    Marital status:    Tobacco Use    Smoking status: Every Day     Current packs/day: 0.00     Types: Cigars, Cigarettes    Smokeless tobacco: Never   Substance and Sexual Activity    Alcohol use: Yes     Alcohol/week: 2.0 standard drinks of alcohol     Types: 2 Cans of beer per week    Drug use: Not Currently    Sexual activity: Never       Review of Systems   Unable to perform ROS: Patient nonverbal       Review of patient's allergies indicates:   Allergen Reactions    Baclofen Itching and Anxiety         Scheduled Meds:   albuterol-ipratropium  3 mL Nebulization BID    amLODIPine  10 mg Per NG tube Daily    atorvastatin  20 mg Per NG tube Nightly    carvediloL  25 mg Per NG tube BID    cefTRIAXone (ROCEPHIN) IVPB  2 g Intravenous Q24H    cloNIDine 0.2 mg/24 hr td ptwk  1 patch Transdermal Q7 Days    doxycycline  100 mg Per NG tube Q12H    enoxaparin  30 mg Subcutaneous Q12H    famotidine (PF)  20 mg Intravenous Daily    fenofibrate  48 mg Per NG tube Daily    guaiFENesin 100 mg/5 ml  400 mg Per NG tube Q4H    miconazole NITRATE 2 %   Topical (Top) BID    oxybutynin  5 mg Per NG tube BID    senna-docusate 8.6-50 mg  2 tablet Per NG tube BID    sodium chloride 3%  4 mL Nebulization Q6H    sodium citrate-citric acid 500-334 mg/5 ml  30 mL Oral Once     Continuous Infusions:   dexmedeTOMIDine (Precedex) infusion (titrating) 1.4 mcg/kg/hr (08/06/23 1403)    propofoL 50 mcg/kg/min (08/06/23 0829)     PRN Meds:0.9%  NaCl infusion  "(for blood administration), acetaminophen, etomidate, [] fentaNYL **FOLLOWED BY** fentaNYL, fentaNYL, hydrALAZINE, labetalol, oxyCODONE-acetaminophen, rocuronium, sodium chloride 0.9%, sodium chloride 0.9%    Objective:  BP (!) 149/85   Pulse 83   Temp 98.6 °F (37 °C)   Resp (!) 21   Ht 5' 9.69" (1.77 m)   Wt 86.2 kg (190 lb)   SpO2 100%   BMI 27.51 kg/m²     Physical Exam:   Physical Exam  Vitals reviewed.   HENT:      Head: Normocephalic.   Cardiovascular:      Rate and Rhythm: Normal rate and regular rhythm.   Pulmonary:      Effort: Pulmonary effort is normal. No respiratory distress.      Breath sounds: B/L BS coarse. No wheezing.      Comments: On vent  Abdominal:      General: Bowel sounds are normal. There is no distension.     Palpations: Abdomen is soft.      Tenderness: There is no abdominal tenderness.   Genitourinary:     Comments: Suprapubic catheter  Musculoskeletal:      Cervical back: Normal range of motion.      Comments: Paraplegic   Skin:     Findings: No rash.      Comments: Wounds dressed   Neurological:      Mental Status:  Sedated on vent  Psychiatric:         Behavior: Not communicative at this time        Imaging  Imaging Results              CT Knee W W/O Contrast Right (Final result)  Result time 23 18:37:42      Final result by Gustavo Cassidy MD (23 18:37:42)                   Impression:      Mildly limited assessment.  5 cm area of subcutaneous fluid in the prepatellar region may have thin peripheral enhancement.  Fluid collection is possible.    Subcutaneous edema in the calf.      Electronically signed by: Gustavo Cassidy  Date:    2023  Time:    18:37               Narrative:    EXAMINATION:  CT KNEE W W/O CONTRAST RIGHT    CLINICAL HISTORY:  possible infection;    TECHNIQUE:  CT imaging of the right knee before and after IV contrast.  Axial, coronal and sagittal reformatted images reviewed.   Dose length product is 585 mGycm. Automatic exposure " control, adjustment of mA/kV or iterative reconstruction technique used to limit radiation dose.    COMPARISON:  Radiograph 06/28/2023    FINDINGS:  Assessment mildly limited by motion.  Joint alignments are maintained with degenerative changes at the knee.  Fixation hardware in the lower femur and tibia with remote proximal fibular fracture.  Areas of heterotopic ossification along the lower portion of the femur.  Small knee effusion.  Areas of subcutaneous edema anteriorly below the knee.  More focal area of fluid in the subcutaneous tissues of the prepatellar region measures up to 5 cm in transverse diameter and 5 cm in length.  There is image noise from the hardware, but this fluid may have thin areas of peripheral enhancement.  No tracking subcutaneous gas.                                       X-Ray Tibia Fibula 2 View Right (Final result)  Result time 06/28/23 18:14:06      Final result by Tracy Zamudio MD (06/28/23 18:14:06)                   Impression:      Posttraumatic and postsurgical changes at the femur and lower leg.  There is chronic deformity at the distal femur with heterogeneous sclerosis and lucency superimposed on background bony demineralization.  No old imaging of this region available for comparison.  This makes it difficult to evaluate for acute superimposed lytic change.    Postsurgical and posttraumatic change at the tibia and fibula with bony demineralization.  No appreciable acute osseous abnormality.      Electronically signed by: Tracy Zamudio  Date:    06/28/2023  Time:    18:14               Narrative:    EXAMINATION:  XR FEMUR 2 VIEW RIGHT; XR TIBIA FIBULA 2 VIEW RIGHT    CLINICAL HISTORY:  possible infection;; infection;    TECHNIQUE:  AP and lateral views of the right femur were performed.    AP and lateral views of the right tibia and fibula were obtained.    COMPARISON:  Knee radiographs 06/28/2023, tibia and fibular radiographs 08/10/2022    FINDINGS:  There are  postsurgical changes of ORIF at the distal femur with lateral plate and screws.  There are postsurgical changes of ORIF at the tibia with antegrade intramedullary raven and proximal and distal interlocking screws.  Hardware appears intact.  No acute fracture seen.  There are old, healed fracture deformities.    There is chronic remodeling at the distal femur with heterogeneous appearance of the marrow and cortex distally.  There are areas of lucency as well as sclerosis.  There is no imaging through the distal femur available for comparison to evaluate for acute lytic changes superimposed on chronic background posttraumatic and postsurgical change.  Older prior radiographs of the tibia and fibula do not adequately imaged the area.  The bones are demineralized.    There is soft tissue swelling at the knee.  There is soft tissue swelling at the ankle.                                       X-Ray Femur 2 View Right (Final result)  Result time 06/28/23 18:14:06      Final result by Tracy Zamudio MD (06/28/23 18:14:06)                   Impression:      Posttraumatic and postsurgical changes at the femur and lower leg.  There is chronic deformity at the distal femur with heterogeneous sclerosis and lucency superimposed on background bony demineralization.  No old imaging of this region available for comparison.  This makes it difficult to evaluate for acute superimposed lytic change.    Postsurgical and posttraumatic change at the tibia and fibula with bony demineralization.  No appreciable acute osseous abnormality.      Electronically signed by: Tracy Zamudio  Date:    06/28/2023  Time:    18:14               Narrative:    EXAMINATION:  XR FEMUR 2 VIEW RIGHT; XR TIBIA FIBULA 2 VIEW RIGHT    CLINICAL HISTORY:  possible infection;; infection;    TECHNIQUE:  AP and lateral views of the right femur were performed.    AP and lateral views of the right tibia and fibula were obtained.    COMPARISON:  Knee radiographs  06/28/2023, tibia and fibular radiographs 08/10/2022    FINDINGS:  There are postsurgical changes of ORIF at the distal femur with lateral plate and screws.  There are postsurgical changes of ORIF at the tibia with antegrade intramedullary raven and proximal and distal interlocking screws.  Hardware appears intact.  No acute fracture seen.  There are old, healed fracture deformities.    There is chronic remodeling at the distal femur with heterogeneous appearance of the marrow and cortex distally.  There are areas of lucency as well as sclerosis.  There is no imaging through the distal femur available for comparison to evaluate for acute lytic changes superimposed on chronic background posttraumatic and postsurgical change.  Older prior radiographs of the tibia and fibula do not adequately imaged the area.  The bones are demineralized.    There is soft tissue swelling at the knee.  There is soft tissue swelling at the ankle.                                       X-Ray Knee 3 View Right (Final result)  Result time 06/28/23 14:18:03      Final result by Lanette Waller MD (06/28/23 14:18:03)                   Impression:      1. No acute bony abnormality.  2. Soft tissue swelling around the knee.      Electronically signed by: Lanette Waller  Date:    06/28/2023  Time:    14:18               Narrative:    EXAMINATION:  XR KNEE 3 VIEW RIGHT    CLINICAL HISTORY:  Effusion, right knee    COMPARISON:  None.    FINDINGS:  There has been prior internal fixation of the femur and tibia.  There are chronic changes of the bones with heterotopic ossification around the knee.  There is no acute fracture identified.  There is soft tissue swelling around the knee.                                       Lab Review   Recent Results (from the past 24 hour(s))   CBC with Differential    Collection Time: 08/06/23  1:56 AM   Result Value Ref Range    WBC 8.41 4.50 - 11.50 x10(3)/mcL    RBC 3.32 (L) 4.70 - 6.10 x10(6)/mcL    Hgb 8.1  (L) 14.0 - 18.0 g/dL    Hct 26.5 (L) 42.0 - 52.0 %    MCV 79.8 (L) 80.0 - 94.0 fL    MCH 24.4 (L) 27.0 - 31.0 pg    MCHC 30.6 (L) 33.0 - 36.0 g/dL    RDW 21.1 (H) 11.5 - 17.0 %    Platelet 492 (H) 130 - 400 x10(3)/mcL    MPV 9.5 7.4 - 10.4 fL    Neut % 62.0 %    Lymph % 26.9 %    Mono % 9.3 %    Eos % 1.0 %    Basophil % 0.2 %    Lymph # 2.26 0.6 - 4.6 x10(3)/mcL    Neut # 5.22 2.1 - 9.2 x10(3)/mcL    Mono # 0.78 0.1 - 1.3 x10(3)/mcL    Eos # 0.08 0 - 0.9 x10(3)/mcL    Baso # 0.02 <=0.2 x10(3)/mcL    IG# 0.05 (H) 0 - 0.04 x10(3)/mcL    IG% 0.6 %    NRBC% 0.0 %   Comprehensive Metabolic Panel    Collection Time: 08/06/23  1:56 AM   Result Value Ref Range    Sodium Level 138 136 - 145 mmol/L    Potassium Level 3.9 3.5 - 5.1 mmol/L    Chloride 102 98 - 107 mmol/L    Carbon Dioxide 24 22 - 29 mmol/L    Glucose Level 166 (H) 74 - 100 mg/dL    Blood Urea Nitrogen 20.0 8.4 - 25.7 mg/dL    Creatinine 0.66 (L) 0.73 - 1.18 mg/dL    Calcium Level Total 9.4 8.4 - 10.2 mg/dL    Protein Total 7.4 6.4 - 8.3 gm/dL    Albumin Level 2.1 (L) 3.5 - 5.0 g/dL    Globulin 5.3 (H) 2.4 - 3.5 gm/dL    Albumin/Globulin Ratio 0.4 (L) 1.1 - 2.0 ratio    Bilirubin Total 0.3 <=1.5 mg/dL    Alkaline Phosphatase 70 40 - 150 unit/L    Alanine Aminotransferase 16 0 - 55 unit/L    Aspartate Aminotransferase 15 5 - 34 unit/L    eGFR >60 mls/min/1.73/m2             Assessment/Plan:  1. SIRS with fevers  2. Group B Streptococcus Right knee prepatellar abscess  3. Group B Streptococcus Right knee septic arthritis  4.  Anemia/reactive thrombocytosis  5.  Paraplegia secondary to T-spine cord injury  6. History of hepatitis-C   7. Chronic right ankle wound/osteomyelitis  8.  Diabetes    9.  Acute kidney injury  10. Acute hypoxic respiratory failure  11. Pulmonary edema with pleural effusions/ Pneumonitis      -Continue ceftriaxone with end date of 08/09.  He is to continue maintenance doxycycline indefinitely for chronic suppression  -No fevers noted and no  leukocytosis  -8/3 chest x-ray results noted  -S/p therapeutic bronchoscopy for mucus plugging today 8/3  -Stool for c-diff PCR and toxin (-)  -7/4 and 7/8 blood cultures negative and sputum culture with moderate yeast.    -7/4 CT scan of the abdomen and pelvis and 7/3 chest x-ray results noted, likely dealing with pulmonary edema with pleural effusions and possibly superimposed infectious pneumonitis.   -6/28 right knee abscess culture with Group B Streptococcus  -Seen by Orthopedic surgery team s/p I&D of R prepatellar bursa abscess and septic R knee with cultures yielding Group G Streptococcus  -6/28 blood cultures negative  -Multiple comorbidities, paraplegic with chronic pressure wounds, right ankle osteomyelitis with exposed bone on chronic suppressive doxycycline  -We will continue surgical site care per Orthopedic surgery team  -We will continue wound care  -He is to continue management of his hepatitis-C as outpatient  -Renal impairment noted, HD per Nephrology, started 7/4  -7/3 Renal ultrasound results noted  -Re-intubated on 7/21 followed by Bronchoscopy with mucus plugging noted  -S/p tracheostomy and PEG tube placement today 8/6  -Continue vent management and ICU support per Intensivist  -Discussed with nursing staff.

## 2023-08-07 NOTE — NURSING
Nurses Note -- 4 Eyes      8/7/2023   11:47 AM      Skin assessed during: Q Shift Change      [] No Altered Skin Integrity Present    []Prevention Measures Documented      [x] Yes- Altered Skin Integrity Present or Discovered   [] LDA Added if Not in Epic (Describe Wound)   [] New Altered Skin Integrity was Present on Admit and Documented in LDA   [] Wound Image Taken    Wound Care Consulted? Yes    Attending Nurse:  Marco Bernabe RN/Staff Member:   Trinity Benson RN

## 2023-08-07 NOTE — PROGRESS NOTES
[facility] - [facility]  Pulmonary Critical Care Note    Patient Name: Bianca Khan  MRN: [id number]  Admission Date: 6/28/2023  Hospital Length of Stay: 40 days  Code Status: Full Code  Attending Provider: Luis Mcclure MD  Primary Care Provider: Areli Vargas PA-C     Subjective:     HPI:   Bianca Khan is a 58 y.o. male with PMH of paraplegia 2/2 spinal cord injury (T1-T6), neurogenic bladder with suprapubic catheter, chronic right ankle osteomyelitis, DM II, HTN, who presented to the ED on 6/28/2023 with CC of right knee pain. Per chart review, CT of right knee revealed 5 cm area of subcutaneous fluid in prepatellar region which was aspirated in the ED; he was taken to the OR on 6/29/2023 by orthopedic surgery team and was found to have prepatellar bursa infection and septic arthritis. Wound culture 6/29/2023 positive for strep agalactiae. Orthopedic surgery team recommended right-sided above-knee amputation d/t chronically infected hardware in right lower extremity. On 7/4/2023, a RRT was called d/t acute respiratory distress that was not improving despite being placed on vapotherm at 35 L/min with FiO2 100%. At the time of examination, patient's initial GCS was 10 but progressively reduced to a GCS of 6. Shared decision with patient's wife was made to intubate patient for airway protection though ABGs were within normal limits. He was admitted to the ICU for close monitoring and further medical management.  The patient underwent incision and drainage of his right knee on 06/29.  He was admitted to the ICU and intubated for airway protection due to altered mental status on 7/4 with placement of hemodialysis catheter with initiation of hemodialysis.  Patient was subsequently extubated on 07/18 for the 2nd time.  He was reintubated on 07/21 secondary to worsening hypoxia and altered mental status with associated mucus plugging.  Status post bronchoscopy x4 since admission to the ICU  for hypoxia associated with mucus plugging.  He has been off of hemodialysis for several days now.      24 Hour Interval History:  S/p Trach and PEG at yesterday.  LTAC placement pending.  Continue antibiotics for septic arthritis with wound VAC in place. Continue TF 30 cc/ hr. On precedex and propofol for sedation.     ROS: unobtainable 2/2  sedation.       Past Medical History:   Diagnosis Date    Arthritis     Chronic ulcer of ankle 05/26/2022    Frequent UTI 07/02/2019    Generalized anxiety disorder 05/26/2022    Neurogenic bladder 05/26/2022    Osteomyelitis 05/26/2022    Presence of suprapubic catheter 05/26/2022    Pure hypercholesterolemia 05/26/2022    Retention of urine, unspecified 08/09/2019    Spinal cord injury at T1-T6 level 04/20/2018     Past Surgical History:   Procedure Laterality Date    INCISION AND DRAINAGE, LOWER EXTREMITY Right 6/29/2023    Procedure: INCISION AND DRAINAGE, LOWER EXTREMITY;  Surgeon: Prabhu Shen DO;  Location: [facility];  Service: Orthopedics;  Laterality: Right;  supine bone foam wash stuff cultures    INSERTION OF INTRAMEDULLARY MARISA Right 8/10/2022    Procedure: INSERTION, INTRAMEDULLARY MARISA RIGHT TIBIA;  Surgeon: Jorge Orellana MD;  Location: [facility];  Service: Orthopedics;  Laterality: Right;     Social History     Socioeconomic History    Marital status:    Tobacco Use    Smoking status: Every Day     Current packs/day: 0.00     Types: Cigars, Cigarettes    Smokeless tobacco: Never   Substance and Sexual Activity    Alcohol use: Yes     Alcohol/week: 2.0 standard drinks of alcohol     Types: 2 Cans of beer per week    Drug use: Not Currently    Sexual activity: Never     Current Outpatient Medications   Medication Instructions    amitriptyline (ELAVIL) 50 mg, Oral, Nightly    amLODIPine (NORVASC) 10 MG tablet 1 tablet    atorvastatin (LIPITOR) 20 mg, Oral, Nightly    busPIRone (BUSPAR) 5 MG Tab 1 tablet    carvediloL (COREG) 12.5 mg, Oral    cyclobenzaprine  (FLEXERIL) 10 mg, Oral, 2 times daily PRN    doxycycline (VIBRAMYCIN) 100 mg, Oral, Daily    fenofibrate 160 mg, Oral    FLUoxetine 20 mg, Oral    gabapentin (NEURONTIN) 300 MG capsule 1 capsule    lisinopriL (PRINIVIL,ZESTRIL) 10 mg, Oral, Nightly    lisinopriL 10 mg, Oral, Daily    LORazepam (ATIVAN) 1 mg, Oral, 2 times daily    melatonin (MELATIN) 3 mg tablet TAKE TWO TABLETS BY MOUTH EVERY NIGHT AT BEDTIME AS NEEDED FOR INSOMNIA.    meloxicam (MOBIC) 15 mg, Oral, Daily    metFORMIN (GLUCOPHAGE) 500 MG tablet SMARTSI Tablet(s) By Mouth Morning-Evening    oxybutynin (DITROPAN) 5 mg, Oral, 2 times daily    oxyCODONE-acetaminophen (PERCOCET)  mg per tablet 1 tablet, Oral, Every 6 hours PRN    pantoprazole (PROTONIX) 40 MG tablet 1 tablet    temazepam (RESTORIL) 30 mg, Oral, Nightly    traZODone (DESYREL) 50 mg, Oral, Nightly    XARELTO 20 mg Tab SMARTSI Tablet(s) By Mouth Every Evening     Current Inpatient Medications   albuterol-ipratropium  3 mL Nebulization BID    amLODIPine  10 mg Per NG tube Daily    atorvastatin  20 mg Per NG tube Nightly    carvediloL  25 mg Per NG tube BID    cefTRIAXone (ROCEPHIN) IVPB  2 g Intravenous Q24H    cloNIDine 0.2 mg/24 hr td ptwk  1 patch Transdermal Q7 Days    doxycycline  100 mg Per NG tube Q12H    enoxaparin  30 mg Subcutaneous Q12H    famotidine (PF)  20 mg Intravenous Daily    fenofibrate  48 mg Per NG tube Daily    guaiFENesin 100 mg/5 ml  400 mg Per NG tube Q4H    miconazole NITRATE 2 %   Topical (Top) BID    oxybutynin  5 mg Per NG tube BID    senna-docusate 8.6-50 mg  2 tablet Per NG tube BID    sodium chloride 3%  4 mL Nebulization Q6H    sodium citrate-citric acid 500-334 mg/5 ml  30 mL Oral Once     Current Intravenous Infusions   dexmedeTOMIDine (Precedex) infusion (titrating) 1.4 mcg/kg/hr (23)    propofoL 50 mcg/kg/min (23)       Objective:       Intake/Output Summary (Last 24 hours) at 2023 0450  Last data filed at 2023  2358  Gross per 24 hour   Intake 1430.23 ml   Output 2300 ml   Net -869.77 ml       Vital Signs (Most Recent):  Temp: 98 °F (36.7 °C) (08/07/23 0400)  Pulse: 81 (08/07/23 0445)  Resp: 20 (08/07/23 0445)  BP: (!) 157/83 (08/07/23 0430)  SpO2: 100 % (08/07/23 0445)  Body mass index is 27.51 kg/m².  Weight: 86.2 kg (190 lb) Vital Signs (24h Range):  Temp:  [98 °F (36.7 °C)-99.3 °F (37.4 °C)] 98 °F (36.7 °C)  Pulse:  [] 81  Resp:  [18-36] 20  SpO2:  [94 %-100 %] 100 %  BP: (104-194)/() 157/83       Physical Exam  General:  no acute respiratory distress  Head: Normocephalic, atraumatic, Trach and PEG in place   Eyes: PERRL, EOMI, anicteric sclera  Neck: supple  CVS:  RRR  Resp: coarse breath sounds bilaterally, decreased breath sound left base, no wheezes or rales  GI:  Abdomen soft, non-distended, normoactive bowel sounds. Suprapubic catheter in place.  Skin: No rashes, ulcers, erythema  Neuro: slightly agitated this morning       Lines/Drains/Airways       Drain  Duration                  Suprapubic Catheter 07/31/23 1410 22 Fr. 6 days         Gastrostomy/Enterostomy 08/06/23 1530 LUQ feeding <1 day              Airway  Duration             Adult Surgical Airway 08/06/23 1500 Shiley Extra Large Cuffed Distal 8.0 / 85 mm <1 day              Peripheral Intravenous Line  Duration                  Midline Catheter Insertion/Assessment  - Single Lumen 07/22/23 1603 Left basilic vein (medial side of arm) other (see comments) 15 days         Peripheral IV - Single Lumen 07/27/23 2145 20 G Posterior;Left Hand 10 days                  Significant Labs:    Lab Results   Component Value Date    WBC 9.58 08/07/2023    HGB 8.4 (L) 08/07/2023    HCT 26.9 (L) 08/07/2023    MCV 79.1 (L) 08/07/2023     (H) 08/07/2023     BMP  Lab Results   Component Value Date     08/07/2023    K 4.6 08/07/2023    CHLORIDE 108 (H) 08/07/2023    CO2 21 (L) 08/07/2023    BUN 14.5 08/07/2023    CREATININE 0.58 (L) 08/07/2023     "CALCIUM 9.0 08/07/2023    AGAP 10.0 08/04/2023    ESTGFRAFRICA 101 05/11/2022    EGFRNONAA >60 04/27/2022     ABG  No results for input(s): "PH", "PO2", "PCO2", "HCO3", "BE" in the last 168 hours.    Mechanical Ventilation Support:  Vent Mode: A/C (08/07/23 0214)  Ventilator Initiated: Yes (07/21/23 1655)  Set Rate: 20 BPM (08/07/23 0214)  Vt Set: 450 mL (08/07/23 0214)  Pressure Support: 12 cmH20 (08/04/23 0031)  PEEP/CPAP: 5 cmH20 (08/07/23 0214)  Oxygen Concentration (%): 40 (08/07/23 0214)  Peak Airway Pressure: 9 cmH20 (08/07/23 0214)  Total Ve: 9.8 L/m (08/07/23 0214)  F/VT Ratio<105 (RSBI): (!) 48.54 (08/06/23 2003)      Assessment/Plan:     Assessment  Septic arthritis of the right knee with Strep agalactiae s/p I&D on 06/29/2023 with wound VAC in place currently on doxycycline for suppressive therapy  Acute hypoxemic respiratory failure with associated pneumonia in addition to pulmonary edema on mechanical ventilation s/p Trach and PEG on  8/7/23  S/P bronchoscopy on 8/5 2/2 desaturation revealing complete obstruction of the right lung with complete impaction of mucus and all subsegments including the right mainstem bronchus.  ANTON with CKD 4 s/p multiple sessions HD currently not receiving hemodialysis   Hypoactive delirium   Paroxysmal a-fib with permanent pacemaker in place   Hx of paraplegia secondary to spinal cord injury at level T1 with associated neurogenic bladder and chronic right ankle osteomyelitis  DM II  HTN      Plan  -failure to wean from mechanical ventilation due to altered mental status, generalized debility, and poor secretion clearance-> tracheostomy and PEG tube placement on 8/7/23  -continue wound VAC per orthopedic surgery recommendations, they have recommended amputation this hospital stay but patient has refused  -infectious disease service following, on ceftriaxone with tentative end date 08/09/2023, with chronic suppressive doxycycline to continue indefinitely  - LTAC placement " pending     DVT Prophylaxis: Lovenox  GI Prophylaxis: Shane Mejia DO PGY-1  Pulmonary Disease and Critical Care Medicine  [facility] - [facility] ICU

## 2023-08-07 NOTE — NURSING
08/07/23 0800   Pain/Comfort/Sleep   Preferred Pain Scale rFLACC (Revised Face Legs Arms Cry Consolability Scale)   rFLACC Pain Rating: - Face 1-->occasional grimace or frown, withdrawn, disinterested, appears sad or worried   rFLACC Pain Rating: - Legs 0-->normal position or relaxed   rFLACC Pain Rating: - Activity 1-->squirming, shifting back and forth, tense, mildly agitated, shallow, splinting respirations, intermittent signs   rFLACC Pain Rating: - Cry 1-->moans or whimpers, occasional complaint, occasional verbal outburst or grunt   rFLACC Pain Rating: - Consolability 2-->difficult to console or comfort, pushing away caregiver, resisting care or comfort measures   rFLACC Score: 5   Pain/Comfort/Sleep Interventions   Sleep/Rest Enhancement awakenings minimized;room darkened;regular sleep/rest pattern promoted   Cognitive   Cognitive/Neuro/Behavioral WDL ex;all   Level of Consciousness (AVPU) responds to voice   Arousal Level arouses to touch/gentle shaking;opens eyes spontaneously   Orientation other (see comments)  (heather)   Speech tracheostomy   Mood/Behavior calm   Cognitive/Behavioral/Neuro   LUE Motor Response purposeful motor response   RUE Motor Response purposeful motor response   LLE Motor Response paraplegia   RLE Motor Response no response to stimuli   Cognitive Interventions   Communication Enhancement Strategies nonverbal strategies used;repetition utilized   Sensory Stimulation Regulation television on   Pupils   Pupil PERRLA yes   Pupil Size Left 3 mm   Pupil Shape Left round   Pupil Reaction Left brisk;equal   Pupil Accommodation Left normal response   Pupil Size Right 3 mm   Pupil Shape Right round   Pupil Reaction Right brisk;equal   Pupil Accommodation Right normal response   [place] Coma Scale   Best Eye Response 3-->(E3) to speech   Best Motor Response 6-->(M6) obeys commands   Best Verbal Response 1-->(V1) none   [place] Coma Scale Score 10   Assessment Qualifiers patient chemically  sedated or paralyzed;patient intubated   Motor Response   Motor Response general motor response   Hand /Ankle Strength   Hand , Left weak   Hand , Right weak   Dorsiflexion, Left absent   Dorsiflexion, Right absent   Plantarflexion, Left absent   Plantarflexion, Right absent   Superficial Reflexes   Left Corneal Reflex present   Right Corneal Reflex present   Gag Reflex present   Cough Reflex  present   Visual Assessment   Visual Tracking/Gaze normal   HEENT   HEENT WDL ex   Face Symptoms symmetrical at rest, with movement and expression;no swelling or tenderness   Left Eye Symptoms drainage   Right Eye Symptoms drainage   Lip Symptoms dry;pink;intact   Oral Mucosa Symptoms intact;moist;pink   Neck WDL   Neck WDL ex   Neck Symptoms trach/stoma   Mouth/Teeth WDL   Mouth/Teeth WDL ex;teeth   Teeth Symptoms teeth absent   Respiratory   Respiratory WDL ex;all   Rhythm/Pattern, Respiratory assisted mechanically   Expansion/Accessory Muscles/Retractions no retractions;no use of accessory muscles;expansion symmetric   Nailbeds no discoloration   Mucous Membranes moist;intact;pink   Cough Frequency infrequent   Cough Type assisted;productive   Cough And Deep Breathing unable to perform   Suction   Suction Method oral;tracheal   Oral Suction mouth   Tracheal Suction in-line suction catheter (closed)   IHI Ventilator Associated Pneumonia Bundle (Required)   Daily Awakening Trials Performed Yes   Daily Assessment of Readiness to Extubate Yes   Head of Bed Elevated  HOB 30   Oral Care Mouth suctioned;Mouth moisturizer;Mouth swabbed;Lip moisturizer applied   Peptic Ulcer ProphylaxisProvided Peptic ulcer prophylaxis maintained   Vent Circut Breaks Minimized Yes   VTE Prophylaxis Provided VTE prophylaxis maintained   Respiratory Secretions Assessment   Secretions Amount moderate   Secretions Characteristics thick   Breath Sounds   All Lung Fields Breath Sounds Anterior:;coarse   Breath Sounds All Fields   Adult  Surgical Airway 08/06/23 1500 Shiley Extra Large Cuffed Distal 8.0 / 85 mm   Placement Date/Time: 08/06/23 1500   Present Prior to Hospital Arrival?: No  Inserted by: MD  Placed By: ICU physician;Resident   Type: Tracheostomy  Brand: Shiley  Airway Device Style: Extra Large Cuffed Distal  Airway Device Size: 8.0 / 85 mm   Cuff Inflation? Inflated   Speaking Valve Usage Not wearing   Status Secured   Site Assessment Dry;Drainage   Site Care Cleansed;Dried;Dressing applied   Inner Cannula Care Changed/new   Ties Assessment Clean;Dry;Intact   Oxygen Therapy   Device (Oxygen Therapy) ventilator   Oxygen Concentration (%) 40   Chest Physiotherapy (CPT)   Type (CPT) percussion   Method (CPT) mechanical percussor   Patient Position (CPT) HOB elevated   CPT Total Time (Min) 10   Post Treatment Assessment (CPT) breath sounds improved   Signs of Intolerance (CPT) none   Respiratory Interventions   Airway/Ventilation Support humidification applied;pulmonary hygiene promoted;comfort measures provided   Airway/Ventilation Management airway patency maintained;calming measures promoted;humidification applied;pulmonary hygiene promoted   Lung Protection Measures fluid excess minimized   Cardiac   Cardiac WDL WDL   Cardiac Rhythm radial pulse regular;apical pulse regular   Heart Sounds S1, S2   ECG   Lead Monitored Lead II   Rhythm normal sinus rhythm   Frequency/Ectopy PVCs   Pacemaker   Pacer Type permanent   Pacer Rhythm ventricular paced   Peripheral Neurovascular   Peripheral Neurovascular WDL WDL   Capillary Refill, General less than/equal to 3 secs   Pulse Assessment radial;dorsalis pedis   VTE Required Core Measure (SCDs) Sequential compression device initiated/maintained;Pharmacological prophylaxis initiated/maintained   VTE Prevention/Management remove, assess skin, and reapply sequential compression device   All Extremities Neurovascular Assessment   General All Extremity Sensation other (see comments)  (heather)   General  All Extremity Color no discoloration   General All Extremity Temperature warm   Pulse Radial   Left Radial Pulse 2+ (normal)   Right Radial Pulse 2+ (normal)   Pulse Dorsalis Pedis   Left Dorsalis Pedis Pulse 2+ (normal)   Right Dorsalis Pedis Pulse 2+ (normal)   Edema   Edema knee, right   Foot, Left Edema 0 (None Present)   Foot, Right Edema 0 (None Present)   Ankle, Left Edema 0 (None Present)   Ankle, Right Edema 0 (None Present)   Hand, Left Edema 0 (None Present)   Hand, Right Edema 0 (None Present)   Dependent Edema 0 (None Present)   Generalized Edema 0 (None Present)   Knee, Right Edema 0 (None Present)   Leg, Right Edema 1+ (Trace)        Peripheral IV - Single Lumen 07/27/23 2145 20 G Posterior;Left Hand   Placement Date/Time: 07/27/23 2145   Inserted by: RN  Size/Length: 20 G  Orientation: Posterior;Left  Location: Hand  Site Prep: Alcohol  Insertion attempts (enter comment if more than 2 attempts): 1  Patient Tolerance: Tolerated well   Site Assessment Clean;Dry;Intact;No redness;No swelling   Extremity Assessment Distal to IV No abnormal discoloration;No redness;No swelling;No warmth   Line Status Saline locked;Flushed   Dressing Status Clean;Dry;Intact   Dressing Intervention Integrity maintained        Midline Catheter Insertion/Assessment  - Single Lumen 07/22/23 1603 Left basilic vein (medial side of arm) other (see comments)   Placement Date/Time: 07/22/23 1603   Present Prior to Hospital Arrival?: No  Hand Hygiene: Performed  Barrier Precautions: Performed  Skin Antisepsis: ChloraPrep  Device: Bard  Side: Left  Location: basilic vein (medial side of arm)  Size: other (see ...   Site Assessment Clean;Dry;Intact;No redness;No swelling   IV Device Securement catheter securement device   Line Status Infusing   Dressing Type Central line dressing   Dressing Status Clean;Dry;Intact   Dressing Intervention Integrity maintained   Dressing Change Due 08/02/23   Site Change Due 08/09/23   Skin   Skin WDL  ex   Skin Color/Characteristics without discoloration   Skin Temperature warm   Skin Moisture dry;flaky   Skin Elasticity quick return to original state   Skin Integrity incision;wound;scab;drain/device(s)   Specialty Bed/Overlay Low air loss;Air fluidized   Bed Support Surface Assessed   Lui Risk Assessment   Sensory Perception 2-->very limited   Moisture 3-->occasionally moist   Activity 1-->bedfast   Mobility 1-->completely immobile   Nutrition 3-->adequate   Friction and Shear 1-->problem   Lui Score 11   Pressure Injury Prevention    Check Moisture Management Pad Done   Sacral Foam Dressing Peel back sacral foam dressing, assess skin and reapply   Heel protection technique Heel boot   Heel preventative measures Peel back dressing/boot, assess skin and reapply   Check Medical Devices Done        Incision/Site 06/29/23 1103 Right Leg   Date First Assessed/Time First Assessed: 06/29/23 1103   Side: Right  Location: Leg   Wound Image    Dressing Appearance Intact;Moist drainage   Drainage Amount Small   Drainage Characteristics/Odor Serosanguineous;No odor   Appearance Pink;Red;Moist   Red (%), Wound Tissue Color 100 %   Periwound Area Dry;Intact   Wound Edges Irregular   Wound Length (cm) 2 cm   Wound Width (cm) 1 cm   Wound Depth (cm) 0.5 cm   Wound Volume (cm^3) 1 cm^3   Wound Surface Area (cm^2) 2 cm^2   Tunneling (depth (cm)/location) 12 noon  3.2cm   Care Cleansed with:;Sterile normal saline   Dressing Changed        Negative Pressure Wound Therapy  06/29/23 1120 Right anterior   Placement Date/Time: 06/29/23 1120   Side: Right  Orientation: anterior  Location: Leg   NPWT Type Vacuum Therapy   Therapy Setting NPWT Continuous therapy   Pressure Setting NPWT 125 mmHg   Therapy Interventions NPWT Seal intact   Sponges Inserted NPWT Black;White;1   Sponges Removed NPWT Black;White;1   Skin Interventions   Device Skin Pressure Protection absorbent pad utilized/changed;positioning supports utilized;mittens  applied to hands;pressure points protected   Pressure Reduction Devices positioning supports utilized;heel offloading device utilized   Pressure Reduction Techniques weight shift assistance provided;sit time limited to 2 hours;pressure points protected   Skin Protection adhesive use limited;incontinence pads utilized;mittens applied to hands;pulse oximeter probe site changed;silicone foam dressing in place   Musculoskeletal   Musculoskeletal WDL ex;extremity movement;mobility   General Mobility significantly impaired   Right Joint Swelling knee   Extremity Movement LUE;RUE;LLE;RLE   LUE Extremity Movement active ROM mildly impaired   RUE Extremity Movement active ROM mildly impaired   LLE Extremity Movement active ROM absent   RLE Extremity Movement active ROM absent   Gastrointestinal   GI WDL WDL   Abdominal Appearance rounded   Abdominal Palpation All Quadrants   All Quadrants Abdominal Palpation soft/nontender   Bowel Sounds All Quadrants   All Quadrants Bowel Sounds audible and normoactive   Last Bowel Movement 08/06/23        Gastrostomy/Enterostomy 08/06/23 1530 LUQ feeding   Placement Date/Time: 08/06/23 1530   Inserted by: MD  Location: LUQ  Indication: feeding   Securement secured to abdomen   Interventions Prior to Feeding patency checked   Clamp Status/Tolerance no abdominal discomfort;no abdominal distention   Feeding Action feeding continued   Dressing dry and intact   Insertion Site dry   Current Rate (mL/hr) 40 mL/hr   Goal Rate (mL/hr) 60 mL/hr   Formula Name Pep HP   Genitourinary   Genitourinary WDL ex;voiding ability/characteristics   Voiding Characteristics suprapubic catheter   Urine Characteristics clear yellow        Suprapubic Catheter 07/31/23 1410 22 Fr.   Placement Date/Time: 07/31/23 1410   Present Prior to Hospital Arrival?: No  Inserted by: NP  Tube Size (Fr.): 22 Fr.  Catheter Balloon Inflation Volume: 10 mL  Placement/Insertion: inserted  Drainage Method: leg bag to dependent  drainage  Urine Retur...   Dressing no dressing   Characteristics no redness;no warmth;no drainage;no tenderness;no swelling   Urine Color Yellow   Collection Container Standard drainage bag   Securement secured to upper leg w/ leg strap   Coping/Psychosocial   Observed Emotional State restless   Coping/Psychosocial Interventions   Supportive Measures positive reinforcement provided   Environmental Support calm environment promoted;environmental consistency promoted   Psychosocial Support   Trust Relationship/Rapport care explained;thoughts/feelings acknowledged   Diversional Activities television   Involvement in Care   Family/Support Persons family   Nutrition   Diet/Nutrition Received tube feeding   Diet/Feeding Tolerance good   Nutrition Interventions   Glycemic Management blood glucose monitored   Nutrition Support Management tube feeding rate increased   Safety   Safety WDL WDL   Safety Factors side rails raised x 3;wheels locked;call light in reach;ID band on   Aspiration Risk Screen tube feeding;mechanical ventilation;inability to handle secretions   Airway Safety Measures mask valve resuscitator at bedside;oxygen flowmeter at bedside;suction at bedside   Is Obturator Available? Yes   Location of Obturator?  Head of bed   All Alarms alarm(s) activated and audible   Safety Precautions emergency equipment at bedside   Enhanced Safety Measures room near unit station;monitored by video   Infection Prevention rest/sleep promoted;single patient room provided   Safety Interventions   Aspiration Precautions respiratory status monitored;medication route adjusted;upright posture maintained;tube feeding placement verified;oral hygiene care promoted   Infection Management aseptic technique maintained   Fall Risk Assessment (every shift)   History Of Fall (W/I 3 Mos) 0-->No   Polypharmacy 3-->Yes   Central Nervous System/Psychotropic Medication 3-->Yes   Cardiovascular Medication 3-->Yes   Age Greater Than 65 Years  0-->No   Altered Elimination 2-->Yes   Cognitive Deficit 2-->Yes   Sensory Deficit 2-->Yes   Dizziness/Vertigo 0-->No   Depression 0-->No   Mobility Deficit/Weakness 2-->Yes   Male 1-->Yes   Fall Risk Score 18   ABC Risk for Fall with Injury Assessment   A= Age: Is the patient greater than or equal to 85 years old or frail due to clinical condition? No   B=Bones: Does the patient have osteoporosis, previous fracture, prolonged steroid use, or metastatic bone cancer? No   C=Coagulation Disorders: Does the patient have a bleeding disorder, either through anticoagulants or underlying clinical condition? No   S=recent Surgery: Is the patient post-op surgicalwith a recent lower limb amputation or recent major abdominal or thoracic surgery? No   Safety Management   Safety Promotion/Fall Prevention assistive device/personal item within reach;Fall Risk reviewed with patient/family;lighting adjusted;high risk medications identified;pulse ox;side rails raised x 3;/camera at bedside;room near unit station   Medication Review/Management medications reviewed   Patient Rounds bed in low position;bed wheels locked;call light in patient/parent reach;ID band on;clutter free environment maintained;placement of personal items at bedside;visualized patient   Daily Care   Activity Management Patient unable to perform activities   Activity Assistance Provided assistance, 2 people   Elimination Assistance incontinence pad changed   Mobility   GEMS ([place] Early Mobility Scale) Level 1-Primary in bed activities   Positioning   Body Position turned;right;30 degrees;upper extremity elevated;heels elevated   Head of Bed (HOB) Positioning HOB at 30-45 degrees   Positioning/Transfer Devices pillows;wedge   Hygiene Care   Oral Care suction provided;swabbed with antiseptic solution;lip/mouth moisturizer applied   WOCN follow-up.  Change out of wd vac sponges to right knee abcess.  Finally a slight decrease in lght of tunnel.    Drainage clear.   See new photo.  Pt remains on ventilator.   Tolerated without issue.s

## 2023-08-07 NOTE — PROGRESS NOTES
Inpatient Nutrition Assessment    Admit Date: 6/28/2023   Total duration of encounter: 40 days     Nutrition Recommendation/Prescription     Tube feeding recommendation:   Peptamen Intense VHP goal rate 60 ml/hr to provide  1200 kcal/d (62% est needs)  111 g protein/d (85% est needs)  1008 ml free water/d   (calculations based on estimated 20 hr/d run time)     Continue with Malachi (provides 90 kcal, 2.5 g protein per serving) BID.    If diprivan still off upon F/U will update TF to continue to meet est needs.     Communication of Recommendations: reviewed with nurse    Nutrition Assessment     Malnutrition Assessment/Nutrition-Focused Physical Exam    Malnutrition Context: chronic illness  Malnutrition Level:  (does not meet criteria)  Energy Intake (Malnutrition):  (unable to obtain)  Weight Loss (Malnutrition):  (unable to obtain)  Subcutaneous Fat (Malnutrition):  (does not meet criteria)           Muscle Mass (Malnutrition):  (does not meet criteria)                          Fluid Accumulation (Malnutrition):  (does not meet criteria)        A minimum of two characteristics is recommended for diagnosis of either severe or non-severe malnutrition.    Chart Review    Reason Seen: continuous nutrition monitoring and follow-up    Malnutrition Screening Tool Results   Have you recently lost weight without trying?: Unsure  Have you been eating poorly because of a decreased appetite?: Yes   MST Score: 3     Diagnosis:  Bilateral Pulmonary Infiltrates   Uremic encephalopathy 2/2 acute renal failure  Acute renal failure  Hyponatremia  Septic arthritis    Relevant Medical History: paraplegia 2/2 spinal cord injury (T1-T6), neurogenic bladder with suprapubic catheter, chronic right ankle osteomyelitis, DM II, HTN    Nutrition-Related Medications: senna-docusate    Calorie Containing IV Medications: no significant kcals from medications at this time    Nutrition-Related Labs:  7/5 Na 128, K 5.3, Glu 93, BUN 39.8, Crea 7.07,  Phos 7.1, GFR 8  7/11 Na 133, BUN 75.8, Crea 4.3, Glu 121, Phos 6.5  7/14 .2, Crea 4.09, Glu 118, Phos 5.9  7/18 BUN 59.5, Crea 2.34, Glu 119, Phos 5.3  7/20 Na 148, BUN 71.2, Crea 2.4, Glu 146  7/24 BUN 86, Crea 2.17, Glu 152, Phos 5.2  7/26 Cl 111, Glu 147  7/31 Glu 143  8/3 BUN 29.3, Glu 140  8/8 Cl 108, Glu 136    Diet/PN Order: No diet orders on file  Oral Supplement Order: none  Tube Feeding Order:  Impact Peptide 1.5 (see below for calculation)  Appetite/Oral Intake: not applicable/not applicable  Factors Affecting Nutritional Intake: respiratory status  Food/Alevism/Cultural Preferences: unable to obtain  Food Allergies: none reported    Skin Integrity: incision, wound, scab, drain/device(s)  Wound(s):      Altered Skin Integrity 06/28/23 2230 medial Sacral spine #1-Tissue loss description: Partial thickness       Altered Skin Integrity 06/28/23 2230 Right posterior Buttocks #2-Tissue loss description: Partial thickness       Altered Skin Integrity 06/28/23 2230 Left posterior Buttocks #4-Tissue loss description: Not applicable       Altered Skin Integrity 06/28/23 2230 Right lateral Ankle #6-Tissue loss description: Partial thickness     Comments    7/5/23: Discussed with RN. Will provide tube feeding recommendations for when appropriate to start tube feeding. Receiving kcal from meds. Noted K and Phos, will need renal formula at this time. Will add supplemental protein and Malachi for wound healing.     7/11/23: TF continues, tolerated @ goal rate.     7/14/23: TF continues, tolerated per RN.     7/18/23: TF continues, tolerated per RN. Possible plans for vent weaning today. Still receiving kcal from meds. Noted increase in diprivan. If remains at increased rate, will adjust TF rate to not overfeed.    7/20/23: Tf no longer running. NG pulled out by pt. Now on BIPAP and not able to replace tube. Discussed with RN, can wait a few days, but will eventually need to consider reinserting NG vs. PN if not  "able to insert NG.     7/24/23: Pt now reintubated. NG in place. TF running. Now overfeeding pt and Phos elevated. Will continue with Malachi but change to renal formula. Plans for trach this week per RN. Noted updated TF recs will not meet est protein needs. Will continue at this time and hopefully once trach placed can increase TF rate since diprivan hopefully weaned.    7/27/23: TF on hold for trach and PEG today. Noted now not placed per notes. Can continue with previous TF. Once placed, will update TF to continue to meet est needs.    7/31/23: TF continues, tolerated per RN. Noted plans for trach/PEG later this week. Receiving kcal from meds.     8/3/23: TF on hold for bronch this AM. Still plans for trach/PEG, not yet placed. Still receiving kcal from meds.      8/7/23: PEG and trach now placed. Noted diprivan on hold now. If still off by F/U will update TF to continue to meet est needs.     Anthropometrics    Height: 5' 9.69" (177 cm) Height Method: Stated  Last Weight: 86.2 kg (190 lb) (07/04/23 1114) Weight Method: Standard Scale (stated)  BMI (Calculated): 27.5  BMI Classification: overweight (BMI 25-29.9)        Ideal Body Weight (IBW), Male: 164.14 lb     % Ideal Body Weight, Male (lb): 115.75 %                          Usual Weight Provided By: unable to obtain usual weight    Wt Readings from Last 5 Encounters:   07/04/23 86.2 kg (190 lb)   09/13/22 86.2 kg (190 lb)   08/25/22 86.2 kg (190 lb 0.6 oz)   01/04/22 86.2 kg (189 lb 15.9 oz)     Weight Change(s) Since Admission:  Admit Weight: 86.2 kg (190 lb) (06/28/23 1346)  7/14/23: no new  7/18/23: no new  7/20/23: no new  7/24/23: no new  8/3/23: no new    Estimated Needs    Weight Used For Calorie Calculations: 86.2 kg (190 lb 0.6 oz)  Energy Calorie Requirements (kcal): 1922kcal  Energy Need Method: [facility]  Weight Used For Protein Calculations: 86.2 kg (190 lb 0.6 oz)  Protein Requirements: 130gm (1.5g/kg)  Fluid Requirements (mL): 1000ml + urinary " output  Temp (24hrs), Av.8 °F (37.1 °C), Min:98 °F (36.7 °C), Max:99.7 °F (37.6 °C)    Vtot (L/Min) for [place] Equation Calculation: 9.3    Enteral Nutrition    (On hold)  Formula: Peptamen Intense VHP  Rate/Volume: 60ml/hr  Water Flushes: 50ml/hr q4hr  Additives/Modulars: Malachi BID  Route: nasogastric tube  Method: continuous  Total Nutrition Provided by Tube Feeding, Additives, and Flushes:  Calories Provided  1200 kcal/d, 62% needs   Protein Provided  111 g/d, 85% needs   Fluid Provided  1008 ml/d, N/A% needs   Continuous feeding calculations based on estimated 20 hr/d run time unless otherwise stated.     Parenteral Nutrition    Patient not receiving parenteral nutrition support at this time.    Evaluation of Received Nutrient Intake    Calories: not meeting estimated needs  Protein: not meeting estimated needs    Patient Education    Not applicable.    Nutrition Diagnosis     PES: Inadequate oral intake related to acute illness as evidenced by trach since 23. (continues)    Interventions/Goals     Intervention(s): collaboration with other providers  Goal: Meet greater than 75% of nutritional needs by follow-up. (goal progressing)    Monitoring & Evaluation     Dietitian will monitor energy intake.  Nutrition Risk/Follow-Up: high (follow-up in 1-4 days)   Please consult if re-assessment needed sooner.

## 2023-08-08 LAB
ALBUMIN SERPL-MCNC: 2.2 G/DL (ref 3.5–5)
ALBUMIN/GLOB SERPL: 0.4 RATIO (ref 1.1–2)
ALP SERPL-CCNC: 79 UNIT/L (ref 40–150)
ALT SERPL-CCNC: 19 UNIT/L (ref 0–55)
AST SERPL-CCNC: 14 UNIT/L (ref 5–34)
BASOPHILS # BLD AUTO: 0.03 X10(3)/MCL
BASOPHILS NFR BLD AUTO: 0.2 %
BILIRUBIN DIRECT+TOT PNL SERPL-MCNC: 0.3 MG/DL
BUN SERPL-MCNC: 13.2 MG/DL (ref 8.4–25.7)
CALCIUM SERPL-MCNC: 9.2 MG/DL (ref 8.4–10.2)
CHLORIDE SERPL-SCNC: 103 MMOL/L (ref 98–107)
CO2 SERPL-SCNC: 25 MMOL/L (ref 22–29)
CREAT SERPL-MCNC: 0.68 MG/DL (ref 0.73–1.18)
EOSINOPHIL # BLD AUTO: 0 X10(3)/MCL (ref 0–0.9)
EOSINOPHIL NFR BLD AUTO: 0 %
ERYTHROCYTE [DISTWIDTH] IN BLOOD BY AUTOMATED COUNT: 20.4 % (ref 11.5–17)
GFR SERPLBLD CREATININE-BSD FMLA CKD-EPI: >60 MLS/MIN/1.73/M2
GLOBULIN SER-MCNC: 5.5 GM/DL (ref 2.4–3.5)
GLUCOSE SERPL-MCNC: 186 MG/DL (ref 74–100)
HCT VFR BLD AUTO: 25.8 % (ref 42–52)
HGB BLD-MCNC: 8.1 G/DL (ref 14–18)
IMM GRANULOCYTES # BLD AUTO: 0.12 X10(3)/MCL (ref 0–0.04)
IMM GRANULOCYTES NFR BLD AUTO: 0.9 %
LYMPHOCYTES # BLD AUTO: 1.66 X10(3)/MCL (ref 0.6–4.6)
LYMPHOCYTES NFR BLD AUTO: 12.3 %
MCH RBC QN AUTO: 24.8 PG (ref 27–31)
MCHC RBC AUTO-ENTMCNC: 31.4 G/DL (ref 33–36)
MCV RBC AUTO: 78.9 FL (ref 80–94)
MONOCYTES # BLD AUTO: 1.17 X10(3)/MCL (ref 0.1–1.3)
MONOCYTES NFR BLD AUTO: 8.7 %
NEUTROPHILS # BLD AUTO: 10.52 X10(3)/MCL (ref 2.1–9.2)
NEUTROPHILS NFR BLD AUTO: 77.9 %
NRBC BLD AUTO-RTO: 0 %
PLATELET # BLD AUTO: 508 X10(3)/MCL (ref 130–400)
PMV BLD AUTO: 10 FL (ref 7.4–10.4)
POTASSIUM SERPL-SCNC: 3.6 MMOL/L (ref 3.5–5.1)
PROT SERPL-MCNC: 7.7 GM/DL (ref 6.4–8.3)
RBC # BLD AUTO: 3.27 X10(6)/MCL (ref 4.7–6.1)
SODIUM SERPL-SCNC: 138 MMOL/L (ref 136–145)
WBC # SPEC AUTO: 13.5 X10(3)/MCL (ref 4.5–11.5)

## 2023-08-08 PROCEDURE — 25000003 PHARM REV CODE 250: Performed by: INTERNAL MEDICINE

## 2023-08-08 PROCEDURE — 99900026 HC AIRWAY MAINTENANCE (STAT)

## 2023-08-08 PROCEDURE — 63600175 PHARM REV CODE 636 W HCPCS: Performed by: STUDENT IN AN ORGANIZED HEALTH CARE EDUCATION/TRAINING PROGRAM

## 2023-08-08 PROCEDURE — 25000003 PHARM REV CODE 250

## 2023-08-08 PROCEDURE — 80053 COMPREHEN METABOLIC PANEL: CPT | Performed by: STUDENT IN AN ORGANIZED HEALTH CARE EDUCATION/TRAINING PROGRAM

## 2023-08-08 PROCEDURE — 94003 VENT MGMT INPAT SUBQ DAY: CPT

## 2023-08-08 PROCEDURE — 85025 COMPLETE CBC W/AUTO DIFF WBC: CPT | Performed by: STUDENT IN AN ORGANIZED HEALTH CARE EDUCATION/TRAINING PROGRAM

## 2023-08-08 PROCEDURE — 27100171 HC OXYGEN HIGH FLOW UP TO 24 HOURS

## 2023-08-08 PROCEDURE — 20000000 HC ICU ROOM

## 2023-08-08 PROCEDURE — 63600175 PHARM REV CODE 636 W HCPCS: Performed by: INTERNAL MEDICINE

## 2023-08-08 PROCEDURE — 94640 AIRWAY INHALATION TREATMENT: CPT

## 2023-08-08 PROCEDURE — 99900035 HC TECH TIME PER 15 MIN (STAT)

## 2023-08-08 PROCEDURE — 25000242 PHARM REV CODE 250 ALT 637 W/ HCPCS: Performed by: INTERNAL MEDICINE

## 2023-08-08 PROCEDURE — 99900022

## 2023-08-08 PROCEDURE — 99900031 HC PATIENT EDUCATION (STAT)

## 2023-08-08 PROCEDURE — 94761 N-INVAS EAR/PLS OXIMETRY MLT: CPT

## 2023-08-08 RX ORDER — FAMOTIDINE 10 MG/ML
20 INJECTION INTRAVENOUS 2 TIMES DAILY
Status: DISCONTINUED | OUTPATIENT
Start: 2023-08-08 | End: 2023-08-09 | Stop reason: HOSPADM

## 2023-08-08 RX ADMIN — MICONAZOLE NITRATE 2 % TOPICAL POWDER: at 08:08

## 2023-08-08 RX ADMIN — SODIUM CHLORIDE SOLN NEBU 3% 4 ML: 3 NEBU SOLN at 02:08

## 2023-08-08 RX ADMIN — ENOXAPARIN SODIUM 30 MG: 30 INJECTION SUBCUTANEOUS at 08:08

## 2023-08-08 RX ADMIN — SODIUM CHLORIDE SOLN NEBU 3% 4 ML: 3 NEBU SOLN at 08:08

## 2023-08-08 RX ADMIN — SODIUM CHLORIDE SOLN NEBU 3% 4 ML: 3 NEBU SOLN at 01:08

## 2023-08-08 RX ADMIN — DEXMEDETOMIDINE HYDROCHLORIDE 1.4 MCG/KG/HR: 400 INJECTION INTRAVENOUS at 12:08

## 2023-08-08 RX ADMIN — GUAIFENESIN 400 MG: 200 SOLUTION ORAL at 02:08

## 2023-08-08 RX ADMIN — FAMOTIDINE 20 MG: 10 INJECTION, SOLUTION INTRAVENOUS at 08:08

## 2023-08-08 RX ADMIN — GUAIFENESIN 400 MG: 200 SOLUTION ORAL at 10:08

## 2023-08-08 RX ADMIN — OXYCODONE AND ACETAMINOPHEN 1 TABLET: 10; 325 TABLET ORAL at 02:08

## 2023-08-08 RX ADMIN — HYDRALAZINE HYDROCHLORIDE 10 MG: 20 INJECTION INTRAMUSCULAR; INTRAVENOUS at 02:08

## 2023-08-08 RX ADMIN — DOXYCYCLINE HYCLATE 100 MG: 100 TABLET, COATED ORAL at 08:08

## 2023-08-08 RX ADMIN — LABETALOL HYDROCHLORIDE 10 MG: 5 INJECTION, SOLUTION INTRAVENOUS at 05:08

## 2023-08-08 RX ADMIN — OXYBUTYNIN CHLORIDE 5 MG: 5 TABLET ORAL at 08:08

## 2023-08-08 RX ADMIN — DEXMEDETOMIDINE HYDROCHLORIDE 1.4 MCG/KG/HR: 400 INJECTION INTRAVENOUS at 08:08

## 2023-08-08 RX ADMIN — AMLODIPINE BESYLATE 10 MG: 5 TABLET ORAL at 08:08

## 2023-08-08 RX ADMIN — FENOFIBRATE 48 MG: 48 TABLET, FILM COATED ORAL at 08:08

## 2023-08-08 RX ADMIN — DEXMEDETOMIDINE HYDROCHLORIDE 1.4 MCG/KG/HR: 400 INJECTION INTRAVENOUS at 04:08

## 2023-08-08 RX ADMIN — SENNOSIDES AND DOCUSATE SODIUM 2 TABLET: 50; 8.6 TABLET ORAL at 08:08

## 2023-08-08 RX ADMIN — HYDRALAZINE HYDROCHLORIDE 10 MG: 20 INJECTION INTRAMUSCULAR; INTRAVENOUS at 06:08

## 2023-08-08 RX ADMIN — CARVEDILOL 25 MG: 12.5 TABLET, FILM COATED ORAL at 08:08

## 2023-08-08 RX ADMIN — OXYCODONE AND ACETAMINOPHEN 1 TABLET: 10; 325 TABLET ORAL at 08:08

## 2023-08-08 RX ADMIN — IPRATROPIUM BROMIDE AND ALBUTEROL SULFATE 3 ML: 2.5; .5 SOLUTION RESPIRATORY (INHALATION) at 08:08

## 2023-08-08 RX ADMIN — GUAIFENESIN 400 MG: 200 SOLUTION ORAL at 09:08

## 2023-08-08 RX ADMIN — GUAIFENESIN 400 MG: 200 SOLUTION ORAL at 06:08

## 2023-08-08 RX ADMIN — ACETAMINOPHEN 650 MG: 325 TABLET, FILM COATED ORAL at 08:08

## 2023-08-08 RX ADMIN — GUAIFENESIN 400 MG: 200 SOLUTION ORAL at 05:08

## 2023-08-08 RX ADMIN — ATORVASTATIN CALCIUM 20 MG: 10 TABLET, FILM COATED ORAL at 08:08

## 2023-08-08 NOTE — PLAN OF CARE
Problem: Diabetes Comorbidity  Goal: Blood Glucose Level Within Targeted Range  Outcome: Ongoing, Progressing     Problem: Skin Injury Risk Increased  Goal: Skin Health and Integrity  Outcome: Ongoing, Progressing     Problem: Impaired Wound Healing  Goal: Optimal Wound Healing  Outcome: Ongoing, Progressing     Problem: Pain Acute  Goal: Acceptable Pain Control and Functional Ability  Outcome: Ongoing, Progressing     Problem: Fall Injury Risk  Goal: Absence of Fall and Fall-Related Injury  Outcome: Ongoing, Progressing     Problem: Adult Inpatient Plan of Care  Goal: Patient-Specific Goal (Individualized)  Outcome: Ongoing, Not Progressing  Flowsheets (Taken 8/7/2023 1902)  Individualized Care Needs: unable to state     Problem: Communication Impairment (Mechanical Ventilation, Invasive)  Goal: Effective Communication  Outcome: Ongoing, Not Progressing

## 2023-08-08 NOTE — PROGRESS NOTES
[facility] - [facility]  Pulmonary Critical Care Note    Patient Name: Bianca Khan  MRN: [id number]  Admission Date: 6/28/2023  Hospital Length of Stay: 41 days  Code Status: Full Code  Attending Provider: Luis Mcclure MD  Primary Care Provider: Areli Vargas PA-C     Subjective:     HPI:   Bianca Khan is a 58 y.o. male with PMH of paraplegia 2/2 spinal cord injury (T1-T6), neurogenic bladder with suprapubic catheter, chronic right ankle osteomyelitis, DM II, HTN, who presented to the ED on 6/28/2023 with CC of right knee pain. Per chart review, CT of right knee revealed 5 cm area of subcutaneous fluid in prepatellar region which was aspirated in the ED; he was taken to the OR on 6/29/2023 by orthopedic surgery team and was found to have prepatellar bursa infection and septic arthritis. Wound culture 6/29/2023 positive for strep agalactiae. Orthopedic surgery team recommended right-sided above-knee amputation d/t chronically infected hardware in right lower extremity. On 7/4/2023, a RRT was called d/t acute respiratory distress that was not improving despite being placed on vapotherm at 35 L/min with FiO2 100%. At the time of examination, patient's initial GCS was 10 but progressively reduced to a GCS of 6. Shared decision with patient's wife was made to intubate patient for airway protection though ABGs were within normal limits. He was admitted to the ICU for close monitoring and further medical management.  The patient underwent incision and drainage of his right knee on 06/29.  He was admitted to the ICU and intubated for airway protection due to altered mental status on 7/4 with placement of hemodialysis catheter with initiation of hemodialysis.  Patient was subsequently extubated on 07/18 for the 2nd time.  He was reintubated on 07/21 secondary to worsening hypoxia and altered mental status with associated mucus plugging.  Status post bronchoscopy x4 since admission to the ICU  for hypoxia associated with mucus plugging.  He has been off of hemodialysis for several days now.      24 Hour Interval History:  No acute events documented overnight. Elevated SBP to max of 170s, increased Hydralazine and Labetalol PRN push to q2hr. Continue IV antibiotics for septic arthritis with wound VAC in place. Continue TF 60 cc/hr, tolerating trach and PEG well. On precedex and propofol for sedation. Follows commands and able to answer yes and no questions with head shakes and nods. Urine output of 2.3L in last 24 hours. LTAC placement pending.      ROS: unobtainable 2/2 intubation.        Past Medical History:   Diagnosis Date    Arthritis     Chronic ulcer of ankle 05/26/2022    Frequent UTI 07/02/2019    Generalized anxiety disorder 05/26/2022    Neurogenic bladder 05/26/2022    Osteomyelitis 05/26/2022    Presence of suprapubic catheter 05/26/2022    Pure hypercholesterolemia 05/26/2022    Retention of urine, unspecified 08/09/2019    Spinal cord injury at T1-T6 level 04/20/2018     Past Surgical History:   Procedure Laterality Date    INCISION AND DRAINAGE, LOWER EXTREMITY Right 6/29/2023    Procedure: INCISION AND DRAINAGE, LOWER EXTREMITY;  Surgeon: Prabhu Shen DO;  Location: [facility];  Service: Orthopedics;  Laterality: Right;  supine bone foam wash stuff cultures    INSERTION OF INTRAMEDULLARY MARISA Right 8/10/2022    Procedure: INSERTION, INTRAMEDULLARY MARISA RIGHT TIBIA;  Surgeon: Jorge Orellana MD;  Location: [facility];  Service: Orthopedics;  Laterality: Right;     Social History     Socioeconomic History    Marital status:    Tobacco Use    Smoking status: Every Day     Current packs/day: 0.00     Types: Cigars, Cigarettes    Smokeless tobacco: Never   Substance and Sexual Activity    Alcohol use: Yes     Alcohol/week: 2.0 standard drinks of alcohol     Types: 2 Cans of beer per week    Drug use: Not Currently    Sexual activity: Never     Current Outpatient Medications   Medication  Instructions    amitriptyline (ELAVIL) 50 mg, Oral, Nightly    amLODIPine (NORVASC) 10 MG tablet 1 tablet    atorvastatin (LIPITOR) 20 mg, Oral, Nightly    busPIRone (BUSPAR) 5 MG Tab 1 tablet    carvediloL (COREG) 12.5 mg, Oral    cyclobenzaprine (FLEXERIL) 10 mg, Oral, 2 times daily PRN    doxycycline (VIBRAMYCIN) 100 mg, Oral, Daily    fenofibrate 160 mg, Oral    FLUoxetine 20 mg, Oral    gabapentin (NEURONTIN) 300 MG capsule 1 capsule    lisinopriL (PRINIVIL,ZESTRIL) 10 mg, Oral, Nightly    lisinopriL 10 mg, Oral, Daily    LORazepam (ATIVAN) 1 mg, Oral, 2 times daily    melatonin (MELATIN) 3 mg tablet TAKE TWO TABLETS BY MOUTH EVERY NIGHT AT BEDTIME AS NEEDED FOR INSOMNIA.    meloxicam (MOBIC) 15 mg, Oral, Daily    metFORMIN (GLUCOPHAGE) 500 MG tablet SMARTSI Tablet(s) By Mouth Morning-Evening    oxybutynin (DITROPAN) 5 mg, Oral, 2 times daily    oxyCODONE-acetaminophen (PERCOCET)  mg per tablet 1 tablet, Oral, Every 6 hours PRN    pantoprazole (PROTONIX) 40 MG tablet 1 tablet    temazepam (RESTORIL) 30 mg, Oral, Nightly    traZODone (DESYREL) 50 mg, Oral, Nightly    XARELTO 20 mg Tab SMARTSI Tablet(s) By Mouth Every Evening     Current Inpatient Medications   albuterol-ipratropium  3 mL Nebulization BID    amLODIPine  10 mg Per NG tube Daily    atorvastatin  20 mg Per NG tube Nightly    carvediloL  25 mg Per NG tube BID    cefTRIAXone (ROCEPHIN) IVPB  2 g Intravenous Q24H    cloNIDine 0.2 mg/24 hr td ptwk  1 patch Transdermal Q7 Days    doxycycline  100 mg Per NG tube Q12H    enoxaparin  30 mg Subcutaneous Q12H    famotidine (PF)  20 mg Intravenous Daily    fenofibrate  48 mg Per NG tube Daily    guaiFENesin 100 mg/5 ml  400 mg Per NG tube Q4H    miconazole NITRATE 2 %   Topical (Top) BID    oxybutynin  5 mg Per NG tube BID    senna-docusate 8.6-50 mg  2 tablet Per NG tube BID    sodium chloride 3%  4 mL Nebulization Q6H    sodium citrate-citric acid 500-334 mg/5 ml  30 mL Oral Once     Current  Intravenous Infusions   clevidipine      dexmedeTOMIDine (Precedex) infusion (titrating) 1.4 mcg/kg/hr (08/08/23 0413)    propofoL Stopped (08/07/23 0914)       Objective:       Intake/Output Summary (Last 24 hours) at 8/8/2023 0610  Last data filed at 8/8/2023 0506  Gross per 24 hour   Intake 6176.01 ml   Output 2325 ml   Net 3851.01 ml     Vital Signs (Most Recent):  Temp: 99.4 °F (37.4 °C) (08/08/23 0330)  Pulse: (!) 112 (08/08/23 0451)  Resp: (!) 35 (08/08/23 0451)  BP: (!) 154/86 (08/08/23 0400)  SpO2: (!) 94 % (08/08/23 0451)  Body mass index is 27.51 kg/m².  Weight: 86.2 kg (190 lb) Vital Signs (24h Range):  Temp:  [99.2 °F (37.3 °C)-100.4 °F (38 °C)] 99.4 °F (37.4 °C)  Pulse:  [] 112  Resp:  [13-35] 35  SpO2:  [80 %-100 %] 94 %  BP: (137-214)/() 154/86       Physical Exam  General:  no acute respiratory distress  Head: Normocephalic, atraumatic, Trach and PEG in place   Eyes: PERRL, EOMI, anicteric sclera  Neck: supple  CVS:  RRR, no murmurs, no LE edema   Resp: coarse breath sounds bilaterally, decreased breath sound left base, no wheezes or rales  GI:  Abdomen soft, non-distended, normoactive bowel sounds. Suprapubic catheter in place.  MSK: moves extremities spontaneously  Skin: No rashes, ulcers, erythema. Wound Vac in place and seal secure. Tracheostomy in place, site clean and dry. PEG in place, site clean and dry.    Neuro: No agitation       Lines/Drains/Airways       Drain  Duration                  Suprapubic Catheter 07/31/23 1410 22 Fr. 7 days         Gastrostomy/Enterostomy 08/06/23 1530 LUQ feeding 1 day              Airway  Duration             Adult Surgical Airway 08/06/23 1500 Shiley Extra Large Cuffed Distal 8.0 / 85 mm 1 day              Peripheral Intravenous Line  Duration                  Midline Catheter Insertion/Assessment  - Single Lumen 07/22/23 1603 Left basilic vein (medial side of arm) other (see comments) 16 days         Peripheral IV - Single Lumen 07/27/23 7193  "20 G Posterior;Left Hand 11 days                  Significant Labs:    Lab Results   Component Value Date    WBC 13.50 (H) 08/08/2023    HGB 8.1 (L) 08/08/2023    HCT 25.8 (L) 08/08/2023    MCV 78.9 (L) 08/08/2023     (H) 08/08/2023     BMP  Lab Results   Component Value Date     08/08/2023    K 3.6 08/08/2023    CHLORIDE 103 08/08/2023    CO2 25 08/08/2023    BUN 13.2 08/08/2023    CREATININE 0.68 (L) 08/08/2023    CALCIUM 9.2 08/08/2023    AGAP 10.0 08/04/2023    ESTGFRAFRICA 101 05/11/2022    EGFRNONAA >60 04/27/2022     ABG  No results for input(s): "PH", "PO2", "PCO2", "HCO3", "BE" in the last 168 hours.    Mechanical Ventilation Support:  Vent Mode: A/C (08/08/23 0451)  Ventilator Initiated: Yes (07/21/23 1655)  Set Rate: 20 BPM (08/08/23 0451)  Vt Set: 450 mL (08/08/23 0451)  Pressure Support: 10 cmH20 (08/08/23 0251)  PEEP/CPAP: 5 cmH20 (08/08/23 0451)  Oxygen Concentration (%): 30 (08/08/23 0400)  Peak Airway Pressure: 20 cmH20 (08/08/23 0451)  Total Ve: 12.6 L/m (08/08/23 0451)  F/VT Ratio<105 (RSBI): (!) 76.25 (08/08/23 0451)      Assessment/Plan:     Assessment  Septic arthritis of the right knee with Strep agalactiae s/p I&D on 06/29/2023 with wound VAC in place currently on doxycycline for suppressive therapy  Acute hypoxemic respiratory failure with associated pneumonia in addition to pulmonary edema on mechanical ventilation s/p Trach and PEG on 8/7/23  S/P bronchoscopy on 8/5 2/2 desaturation revealing complete obstruction of the right lung with complete impaction of mucus and all subsegments including the right mainstem bronchus.  ANTON with CKD 4 s/p multiple sessions HD currently not receiving hemodialysis   Hypoactive delirium   Paroxysmal a-fib with permanent pacemaker in place   Hx of paraplegia secondary to spinal cord injury at level T1 with associated neurogenic bladder and chronic right ankle osteomyelitis  DM II  HTN      Plan  -failure to wean from mechanical ventilation due " to altered mental status, generalized debility, and poor secretion clearance-> tracheostomy and PEG tube placement on 8/7/23  -continue wound VAC per orthopedic surgery recommendations, they have recommended amputation this hospital stay but patient has refused  -infectious disease service following, on ceftriaxone with tentative end date 08/09/2023, with chronic suppressive doxycycline to continue indefinitely  - LTAC placement pending     DVT Prophylaxis: Lovenox  GI Prophylaxis: Shane Rivera MD   [facility] -1  Pulmonary Disease and Critical Care Medicine  [facility] - [facility] ICU

## 2023-08-08 NOTE — PROGRESS NOTES
Infectious Diseases Progress Note  58-year-old male with past medical history of T-spine cord injury with paraplegia, diabetes, HTN, hepatitis-C, chronic right ankle wound/osteomyelitis, was on suppressive doxycycline, known to my service, seen by us initially at [facility] and has followed with us at the office for chronic osteomyelitis, is admitted to [facility] on 06/28/2023 presenting through the ED with complaints of pain and swelling to his right knee, apparently struck his knee.  He did also report prior remote right knee surgery.  He was evaluated and noted to have no fevers initially but a temperature of 100.2° today 6/29, no leukocytosis but with thrombocytosis of up to 531 today.  .2 and ESR >130.  He is anemic with low albumin.  Blood cultures have been negative.  CT scan of the right knee noted a 5 cm area of subcutaneous fluid collection in the prepatellar region.  There was aspiration in the ER with findings of purulent fluid and cultures showing group B Streptococcus.  He was seen by the orthopedic surgery team with findings of right prepatellar bursa abscess and is status post incision and drainage of right knee septic arthritis and right prepatellar bursa abscess today 6/29 with group B Streptococcus  He is on ceftriaxone.    Subjective:  Remains in the ICU.  No new complaints, low-grade fevers noted, doing about the same.  Lying in bed in nonacute distress.  He does however attempt to talk and communicate and smiling    Past Medical History:   Diagnosis Date    Arthritis     Chronic ulcer of ankle 05/26/2022    Frequent UTI 07/02/2019    Generalized anxiety disorder 05/26/2022    Neurogenic bladder 05/26/2022    Osteomyelitis 05/26/2022    Presence of suprapubic catheter 05/26/2022    Pure hypercholesterolemia 05/26/2022    Retention of urine, unspecified 08/09/2019    Spinal cord injury at T1-T6 level 04/20/2018     Past Surgical History:   Procedure  Laterality Date    INCISION AND DRAINAGE, LOWER EXTREMITY Right 6/29/2023    Procedure: INCISION AND DRAINAGE, LOWER EXTREMITY;  Surgeon: Prabhu Shen DO;  Location: [facility] OR;  Service: Orthopedics;  Laterality: Right;  supine bone foam wash stuff cultures    INSERTION OF INTRAMEDULLARY MARISA Right 8/10/2022    Procedure: INSERTION, INTRAMEDULLARY MARISA RIGHT TIBIA;  Surgeon: Jogre Orellana MD;  Location: [facility] OR;  Service: Orthopedics;  Laterality: Right;     Social History     Socioeconomic History    Marital status:    Tobacco Use    Smoking status: Every Day     Current packs/day: 0.00     Types: Cigars, Cigarettes    Smokeless tobacco: Never   Substance and Sexual Activity    Alcohol use: Yes     Alcohol/week: 2.0 standard drinks of alcohol     Types: 2 Cans of beer per week    Drug use: Not Currently    Sexual activity: Never       Review of Systems   Unable to perform ROS: Patient nonverbal       Review of patient's allergies indicates:   Allergen Reactions    Baclofen Itching and Anxiety         Scheduled Meds:   albuterol-ipratropium  3 mL Nebulization BID    amLODIPine  10 mg Per NG tube Daily    atorvastatin  20 mg Per NG tube Nightly    carvediloL  25 mg Per NG tube BID    cefTRIAXone (ROCEPHIN) IVPB  2 g Intravenous Q24H    cloNIDine 0.2 mg/24 hr td ptwk  1 patch Transdermal Q7 Days    doxycycline  100 mg Per NG tube Q12H    enoxaparin  30 mg Subcutaneous Q12H    famotidine (PF)  20 mg Intravenous Daily    fenofibrate  48 mg Per NG tube Daily    guaiFENesin 100 mg/5 ml  400 mg Per NG tube Q4H    miconazole NITRATE 2 %   Topical (Top) BID    oxybutynin  5 mg Per NG tube BID    senna-docusate 8.6-50 mg  2 tablet Per NG tube BID    sodium chloride 3%  4 mL Nebulization Q6H    sodium citrate-citric acid 500-334 mg/5 ml  30 mL Oral Once     Continuous Infusions:   clevidipine      dexmedeTOMIDine (Precedex) infusion (titrating) 1.4 mcg/kg/hr (08/07/23 2115)    propofoL Stopped (08/07/23 0914)     PRN  "Meds:0.9%  NaCl infusion (for blood administration), acetaminophen, etomidate, [] fentaNYL **FOLLOWED BY** fentaNYL, fentaNYL, hydrALAZINE, HYDROmorphone, labetalol, oxyCODONE-acetaminophen, rocuronium, sodium chloride 0.9%, sodium chloride 0.9%    Objective:  BP (!) 158/95   Pulse (!) 113   Temp 99.2 °F (37.3 °C) (Oral)   Resp (!) 28   Ht 5' 9.69" (1.77 m)   Wt 86.2 kg (190 lb)   SpO2 98%   BMI 27.51 kg/m²     Physical Exam:   Physical Exam  Vitals reviewed.   HENT:      Head: Normocephalic.   Cardiovascular:      Rate and Rhythm: Normal rate and regular rhythm.   Pulmonary:      Effort: Pulmonary effort is normal. No respiratory distress.      Breath sounds: B/L BS coarse. No wheezing.      Comments: On vent  Abdominal:      General: Bowel sounds are normal. There is no distension.     Palpations: Abdomen is soft.      Tenderness: There is no abdominal tenderness.   Genitourinary:     Comments: Suprapubic catheter  Musculoskeletal:      Cervical back: Normal range of motion.      Comments: Paraplegic   Skin:     Findings: No rash.      Comments: Wounds dressed   Neurological:      Mental Status:  Sedated on vent  Psychiatric:         Behavior: Not communicative at this time     Imaging  Imaging Results              CT Knee W W/O Contrast Right (Final result)  Result time 23 18:37:42      Final result by Gustavo Cassidy MD (23 18:37:42)                   Impression:      Mildly limited assessment.  5 cm area of subcutaneous fluid in the prepatellar region may have thin peripheral enhancement.  Fluid collection is possible.    Subcutaneous edema in the calf.      Electronically signed by: Gustavo Cassidy  Date:    2023  Time:    18:37               Narrative:    EXAMINATION:  CT KNEE W W/O CONTRAST RIGHT    CLINICAL HISTORY:  possible infection;    TECHNIQUE:  CT imaging of the right knee before and after IV contrast.  Axial, coronal and sagittal reformatted images reviewed.   Dose " length product is 585 mGycm. Automatic exposure control, adjustment of mA/kV or iterative reconstruction technique used to limit radiation dose.    COMPARISON:  Radiograph 06/28/2023    FINDINGS:  Assessment mildly limited by motion.  Joint alignments are maintained with degenerative changes at the knee.  Fixation hardware in the lower femur and tibia with remote proximal fibular fracture.  Areas of heterotopic ossification along the lower portion of the femur.  Small knee effusion.  Areas of subcutaneous edema anteriorly below the knee.  More focal area of fluid in the subcutaneous tissues of the prepatellar region measures up to 5 cm in transverse diameter and 5 cm in length.  There is image noise from the hardware, but this fluid may have thin areas of peripheral enhancement.  No tracking subcutaneous gas.                                       X-Ray Tibia Fibula 2 View Right (Final result)  Result time 06/28/23 18:14:06      Final result by Tracy Zamudio MD (06/28/23 18:14:06)                   Impression:      Posttraumatic and postsurgical changes at the femur and lower leg.  There is chronic deformity at the distal femur with heterogeneous sclerosis and lucency superimposed on background bony demineralization.  No old imaging of this region available for comparison.  This makes it difficult to evaluate for acute superimposed lytic change.    Postsurgical and posttraumatic change at the tibia and fibula with bony demineralization.  No appreciable acute osseous abnormality.      Electronically signed by: Tracy Zamudio  Date:    06/28/2023  Time:    18:14               Narrative:    EXAMINATION:  XR FEMUR 2 VIEW RIGHT; XR TIBIA FIBULA 2 VIEW RIGHT    CLINICAL HISTORY:  possible infection;; infection;    TECHNIQUE:  AP and lateral views of the right femur were performed.    AP and lateral views of the right tibia and fibula were obtained.    COMPARISON:  Knee radiographs 06/28/2023, tibia and fibular  radiographs 08/10/2022    FINDINGS:  There are postsurgical changes of ORIF at the distal femur with lateral plate and screws.  There are postsurgical changes of ORIF at the tibia with antegrade intramedullary raven and proximal and distal interlocking screws.  Hardware appears intact.  No acute fracture seen.  There are old, healed fracture deformities.    There is chronic remodeling at the distal femur with heterogeneous appearance of the marrow and cortex distally.  There are areas of lucency as well as sclerosis.  There is no imaging through the distal femur available for comparison to evaluate for acute lytic changes superimposed on chronic background posttraumatic and postsurgical change.  Older prior radiographs of the tibia and fibula do not adequately imaged the area.  The bones are demineralized.    There is soft tissue swelling at the knee.  There is soft tissue swelling at the ankle.                                       X-Ray Femur 2 View Right (Final result)  Result time 06/28/23 18:14:06      Final result by Tracy Zamudio MD (06/28/23 18:14:06)                   Impression:      Posttraumatic and postsurgical changes at the femur and lower leg.  There is chronic deformity at the distal femur with heterogeneous sclerosis and lucency superimposed on background bony demineralization.  No old imaging of this region available for comparison.  This makes it difficult to evaluate for acute superimposed lytic change.    Postsurgical and posttraumatic change at the tibia and fibula with bony demineralization.  No appreciable acute osseous abnormality.      Electronically signed by: Tracy Zamudio  Date:    06/28/2023  Time:    18:14               Narrative:    EXAMINATION:  XR FEMUR 2 VIEW RIGHT; XR TIBIA FIBULA 2 VIEW RIGHT    CLINICAL HISTORY:  possible infection;; infection;    TECHNIQUE:  AP and lateral views of the right femur were performed.    AP and lateral views of the right tibia and fibula  were obtained.    COMPARISON:  Knee radiographs 06/28/2023, tibia and fibular radiographs 08/10/2022    FINDINGS:  There are postsurgical changes of ORIF at the distal femur with lateral plate and screws.  There are postsurgical changes of ORIF at the tibia with antegrade intramedullary raven and proximal and distal interlocking screws.  Hardware appears intact.  No acute fracture seen.  There are old, healed fracture deformities.    There is chronic remodeling at the distal femur with heterogeneous appearance of the marrow and cortex distally.  There are areas of lucency as well as sclerosis.  There is no imaging through the distal femur available for comparison to evaluate for acute lytic changes superimposed on chronic background posttraumatic and postsurgical change.  Older prior radiographs of the tibia and fibula do not adequately imaged the area.  The bones are demineralized.    There is soft tissue swelling at the knee.  There is soft tissue swelling at the ankle.                                       X-Ray Knee 3 View Right (Final result)  Result time 06/28/23 14:18:03      Final result by Lanette Waller MD (06/28/23 14:18:03)                   Impression:      1. No acute bony abnormality.  2. Soft tissue swelling around the knee.      Electronically signed by: Lanette Waller  Date:    06/28/2023  Time:    14:18               Narrative:    EXAMINATION:  XR KNEE 3 VIEW RIGHT    CLINICAL HISTORY:  Effusion, right knee    COMPARISON:  None.    FINDINGS:  There has been prior internal fixation of the femur and tibia.  There are chronic changes of the bones with heterotopic ossification around the knee.  There is no acute fracture identified.  There is soft tissue swelling around the knee.                                       Lab Review   Recent Results (from the past 24 hour(s))   Comprehensive Metabolic Panel    Collection Time: 08/07/23  1:25 AM   Result Value Ref Range    Sodium Level 140 136 - 145  mmol/L    Potassium Level 4.6 3.5 - 5.1 mmol/L    Chloride 108 (H) 98 - 107 mmol/L    Carbon Dioxide 21 (L) 22 - 29 mmol/L    Glucose Level 136 (H) 74 - 100 mg/dL    Blood Urea Nitrogen 14.5 8.4 - 25.7 mg/dL    Creatinine 0.58 (L) 0.73 - 1.18 mg/dL    Calcium Level Total 9.0 8.4 - 10.2 mg/dL    Protein Total 7.2 6.4 - 8.3 gm/dL    Albumin Level 2.2 (L) 3.5 - 5.0 g/dL    Globulin 5.0 (H) 2.4 - 3.5 gm/dL    Albumin/Globulin Ratio 0.4 (L) 1.1 - 2.0 ratio    Bilirubin Total 0.2 <=1.5 mg/dL    Alkaline Phosphatase 67 40 - 150 unit/L    Alanine Aminotransferase 20 0 - 55 unit/L    Aspartate Aminotransferase 25 5 - 34 unit/L    eGFR >60 mls/min/1.73/m2   CBC with Differential    Collection Time: 08/07/23  2:56 AM   Result Value Ref Range    WBC 9.58 4.50 - 11.50 x10(3)/mcL    RBC 3.40 (L) 4.70 - 6.10 x10(6)/mcL    Hgb 8.4 (L) 14.0 - 18.0 g/dL    Hct 26.9 (L) 42.0 - 52.0 %    MCV 79.1 (L) 80.0 - 94.0 fL    MCH 24.7 (L) 27.0 - 31.0 pg    MCHC 31.2 (L) 33.0 - 36.0 g/dL    RDW 20.8 (H) 11.5 - 17.0 %    Platelet 487 (H) 130 - 400 x10(3)/mcL    MPV 9.9 7.4 - 10.4 fL    Neut % 70.1 %    Lymph % 18.3 %    Mono % 10.0 %    Eos % 0.8 %    Basophil % 0.2 %    Lymph # 1.75 0.6 - 4.6 x10(3)/mcL    Neut # 6.71 2.1 - 9.2 x10(3)/mcL    Mono # 0.96 0.1 - 1.3 x10(3)/mcL    Eos # 0.08 0 - 0.9 x10(3)/mcL    Baso # 0.02 <=0.2 x10(3)/mcL    IG# 0.06 (H) 0 - 0.04 x10(3)/mcL    IG% 0.6 %    NRBC% 0.0 %         Assessment/Plan:  1. SIRS with fevers  2. Group B Streptococcus Right knee prepatellar abscess  3. Group B Streptococcus Right knee septic arthritis  4.  Anemia/reactive thrombocytosis  5.  Paraplegia secondary to T-spine cord injury  6. History of hepatitis-C   7. Chronic right ankle wound/osteomyelitis  8.  Diabetes    9.  Acute kidney injury  10. Acute hypoxic respiratory failure  11. Pulmonary edema with pleural effusions/ Pneumonitis      -Continue ceftriaxone with end date of 08/09.  He is to continue maintenance doxycycline  indefinitely for chronic suppression  -Low-grade fevers noted and no leukocytosis  -8/3 chest x-ray results noted  -S/p therapeutic bronchoscopy for mucus plugging today 8/3  -Stool for c-diff PCR and toxin (-)  -7/4 and 7/8 blood cultures negative and sputum culture with moderate yeast.    -7/4 CT scan of the abdomen and pelvis and 7/3 chest x-ray results noted, likely dealing with pulmonary edema with pleural effusions and possibly superimposed infectious pneumonitis.   -6/28 right knee abscess culture with Group B Streptococcus  -Seen by Orthopedic surgery team s/p I&D of R prepatellar bursa abscess and septic R knee with cultures yielding Group G Streptococcus  -6/28 blood cultures negative  -Multiple comorbidities, paraplegic with chronic pressure wounds, right ankle osteomyelitis with exposed bone on chronic suppressive doxycycline  -We will continue surgical site care per Orthopedic surgery team  -We will continue wound care  -He is to continue management of his hepatitis-C as outpatient  -Renal impairment noted, HD per Nephrology, started 7/4  -7/3 Renal ultrasound results noted  -Re-intubated on 7/21 followed by Bronchoscopy with mucus plugging noted  -S/p tracheostomy and PEG tube placement today 8/6  -Continue vent management and ICU support per Intensivist  -Discussed with nursing staff.

## 2023-08-08 NOTE — NURSING
Nurses Note -- 4 Eyes      8/8/2023   9:43 AM      Skin assessed during: Q Shift Change      [] No Altered Skin Integrity Present    []Prevention Measures Documented      [x] Yes- Altered Skin Integrity Present or Discovered   [] LDA Added if Not in Epic (Describe Wound)   [] New Altered Skin Integrity was Present on Admit and Documented in LDA   [] Wound Image Taken    Wound Care Consulted? Yes    Attending Nurse:  Marco Bernabe RN/Staff Member:   Christina Maddox RN

## 2023-08-08 NOTE — NURSING
Nurses Note -- 4 Eyes      8/7/2023   7:41 PM      Skin assessed during: Q Shift Change      [] No Altered Skin Integrity Present    [x]Prevention Measures Documented      [x] Yes- Altered Skin Integrity Present or Discovered   [] LDA Added if Not in Epic (Describe Wound)   [] New Altered Skin Integrity was Present on Admit and Documented in LDA   [] Wound Image Taken    Wound Care Consulted? Yes    Attending Nurse:  Ruy Bernabe RN/Staff Member:   Madhuri Villanueva RN

## 2023-08-09 VITALS
HEIGHT: 70 IN | SYSTOLIC BLOOD PRESSURE: 170 MMHG | HEART RATE: 90 BPM | DIASTOLIC BLOOD PRESSURE: 89 MMHG | TEMPERATURE: 99 F | BODY MASS INDEX: 27.2 KG/M2 | WEIGHT: 190 LBS | RESPIRATION RATE: 16 BRPM | OXYGEN SATURATION: 99 %

## 2023-08-09 PROBLEM — J96.01 ACUTE RESPIRATORY FAILURE WITH HYPOXEMIA: Status: ACTIVE | Noted: 2023-08-09

## 2023-08-09 LAB
ALBUMIN SERPL-MCNC: 2.2 G/DL (ref 3.5–5)
ALBUMIN/GLOB SERPL: 0.5 RATIO (ref 1.1–2)
ALP SERPL-CCNC: 79 UNIT/L (ref 40–150)
ALT SERPL-CCNC: 24 UNIT/L (ref 0–55)
ANISOCYTOSIS BLD QL SMEAR: ABNORMAL
AST SERPL-CCNC: 21 UNIT/L (ref 5–34)
BASOPHILS # BLD AUTO: 0.09 X10(3)/MCL
BASOPHILS NFR BLD AUTO: 0.7 %
BILIRUB SERPL-MCNC: 0.3 MG/DL
BUN SERPL-MCNC: 18.6 MG/DL (ref 8.4–25.7)
CALCIUM SERPL-MCNC: 9.1 MG/DL (ref 8.4–10.2)
CHLORIDE SERPL-SCNC: 103 MMOL/L (ref 98–107)
CO2 SERPL-SCNC: 23 MMOL/L (ref 22–29)
CREAT SERPL-MCNC: 0.73 MG/DL (ref 0.73–1.18)
EOSINOPHIL # BLD AUTO: 0.04 X10(3)/MCL (ref 0–0.9)
EOSINOPHIL NFR BLD AUTO: 0.3 %
ERYTHROCYTE [DISTWIDTH] IN BLOOD BY AUTOMATED COUNT: 20.3 % (ref 11.5–17)
GFR SERPLBLD CREATININE-BSD FMLA CKD-EPI: >60 MLS/MIN/1.73/M2
GLOBULIN SER-MCNC: 4.7 GM/DL (ref 2.4–3.5)
GLUCOSE SERPL-MCNC: 181 MG/DL (ref 74–100)
HCT VFR BLD AUTO: 25.8 % (ref 42–52)
HGB BLD-MCNC: 8 G/DL (ref 14–18)
HYPOCHROMIA BLD QL SMEAR: ABNORMAL
IMM GRANULOCYTES # BLD AUTO: 0.13 X10(3)/MCL (ref 0–0.04)
IMM GRANULOCYTES NFR BLD AUTO: 1 %
LYMPHOCYTES # BLD AUTO: 1.96 X10(3)/MCL (ref 0.6–4.6)
LYMPHOCYTES NFR BLD AUTO: 15 %
MACROCYTES BLD QL SMEAR: ABNORMAL
MCH RBC QN AUTO: 24.2 PG (ref 27–31)
MCHC RBC AUTO-ENTMCNC: 31 G/DL (ref 33–36)
MCV RBC AUTO: 77.9 FL (ref 80–94)
MONOCYTES # BLD AUTO: 1.23 X10(3)/MCL (ref 0.1–1.3)
MONOCYTES NFR BLD AUTO: 9.4 %
NEUTROPHILS # BLD AUTO: 9.61 X10(3)/MCL (ref 2.1–9.2)
NEUTROPHILS NFR BLD AUTO: 73.6 %
NRBC BLD AUTO-RTO: 0.8 %
PLATELET # BLD AUTO: 233 X10(3)/MCL (ref 130–400)
PLATELET # BLD EST: NORMAL 10*3/UL
PMV BLD AUTO: 10.6 FL (ref 7.4–10.4)
POTASSIUM SERPL-SCNC: 3.8 MMOL/L (ref 3.5–5.1)
PROT SERPL-MCNC: 6.9 GM/DL (ref 6.4–8.3)
RBC # BLD AUTO: 3.31 X10(6)/MCL (ref 4.7–6.1)
RBC MORPH BLD: ABNORMAL
SODIUM SERPL-SCNC: 139 MMOL/L (ref 136–145)
WBC # SPEC AUTO: 13.06 X10(3)/MCL (ref 4.5–11.5)

## 2023-08-09 PROCEDURE — 25000003 PHARM REV CODE 250: Performed by: INTERNAL MEDICINE

## 2023-08-09 PROCEDURE — 63600175 PHARM REV CODE 636 W HCPCS: Performed by: INTERNAL MEDICINE

## 2023-08-09 PROCEDURE — 94761 N-INVAS EAR/PLS OXIMETRY MLT: CPT

## 2023-08-09 PROCEDURE — 94640 AIRWAY INHALATION TREATMENT: CPT

## 2023-08-09 PROCEDURE — 25000242 PHARM REV CODE 250 ALT 637 W/ HCPCS: Performed by: INTERNAL MEDICINE

## 2023-08-09 PROCEDURE — 99900035 HC TECH TIME PER 15 MIN (STAT)

## 2023-08-09 PROCEDURE — 27100171 HC OXYGEN HIGH FLOW UP TO 24 HOURS

## 2023-08-09 PROCEDURE — 80053 COMPREHEN METABOLIC PANEL: CPT | Performed by: STUDENT IN AN ORGANIZED HEALTH CARE EDUCATION/TRAINING PROGRAM

## 2023-08-09 PROCEDURE — 27000221 HC OXYGEN, UP TO 24 HOURS

## 2023-08-09 PROCEDURE — 85025 COMPLETE CBC W/AUTO DIFF WBC: CPT | Performed by: STUDENT IN AN ORGANIZED HEALTH CARE EDUCATION/TRAINING PROGRAM

## 2023-08-09 PROCEDURE — 99900031 HC PATIENT EDUCATION (STAT)

## 2023-08-09 PROCEDURE — 25000003 PHARM REV CODE 250

## 2023-08-09 PROCEDURE — 94003 VENT MGMT INPAT SUBQ DAY: CPT

## 2023-08-09 PROCEDURE — 99900022

## 2023-08-09 PROCEDURE — 99900026 HC AIRWAY MAINTENANCE (STAT)

## 2023-08-09 RX ORDER — AMOXICILLIN 250 MG
2 CAPSULE ORAL 2 TIMES DAILY
Qty: 1 TABLET | Refills: 0 | Status: ON HOLD
Start: 2023-08-09 | End: 2023-10-03

## 2023-08-09 RX ORDER — GUAIFENESIN 100 MG/5ML
400 SOLUTION ORAL EVERY 4 HOURS
Qty: 1 ML | Refills: 0
Start: 2023-08-09 | End: 2023-10-03

## 2023-08-09 RX ORDER — OXYBUTYNIN CHLORIDE 5 MG/1
5 TABLET ORAL 2 TIMES DAILY
Qty: 60 TABLET | Refills: 11 | Status: ON HOLD
Start: 2023-08-09 | End: 2023-10-03 | Stop reason: SDUPTHER

## 2023-08-09 RX ORDER — AMLODIPINE BESYLATE 10 MG/1
10 TABLET ORAL DAILY
Qty: 1 TABLET | Refills: 0 | Status: SHIPPED | OUTPATIENT
Start: 2023-08-10 | End: 2023-08-09 | Stop reason: SDUPTHER

## 2023-08-09 RX ORDER — IPRATROPIUM BROMIDE AND ALBUTEROL SULFATE 2.5; .5 MG/3ML; MG/3ML
3 SOLUTION RESPIRATORY (INHALATION) 2 TIMES DAILY
Qty: 1 ML | Refills: 0 | Status: ON HOLD
Start: 2023-08-09 | End: 2023-10-03 | Stop reason: HOSPADM

## 2023-08-09 RX ORDER — DOXYCYCLINE HYCLATE 100 MG
100 TABLET ORAL EVERY 12 HOURS
Qty: 1 TABLET | Refills: 0 | Status: SHIPPED | OUTPATIENT
Start: 2023-08-09 | End: 2023-08-09 | Stop reason: SDUPTHER

## 2023-08-09 RX ORDER — FENOFIBRATE 48 MG/1
48 TABLET, FILM COATED ORAL DAILY
Qty: 1 TABLET | Refills: 0 | Status: ON HOLD
Start: 2023-08-10 | End: 2023-10-03 | Stop reason: SDUPTHER

## 2023-08-09 RX ORDER — CARVEDILOL 25 MG/1
25 TABLET ORAL 2 TIMES DAILY
Qty: 1 TABLET | Refills: 0 | Status: ON HOLD
Start: 2023-08-09 | End: 2023-10-03 | Stop reason: SDUPTHER

## 2023-08-09 RX ORDER — SODIUM CITRATE AND CITRIC ACID MONOHYDRATE 334; 500 MG/5ML; MG/5ML
30 SOLUTION ORAL ONCE
Refills: 0 | Status: ON HOLD
Start: 2023-08-09 | End: 2023-10-03 | Stop reason: HOSPADM

## 2023-08-09 RX ORDER — CLONIDINE 0.2 MG/24H
1 PATCH, EXTENDED RELEASE TRANSDERMAL
Qty: 4 PATCH | Refills: 11 | Status: SHIPPED | OUTPATIENT
Start: 2023-08-10 | End: 2023-08-09 | Stop reason: SDUPTHER

## 2023-08-09 RX ORDER — ATORVASTATIN CALCIUM 20 MG/1
20 TABLET, FILM COATED ORAL NIGHTLY
Qty: 1 TABLET | Refills: 0 | Status: SHIPPED | OUTPATIENT
Start: 2023-08-09 | End: 2023-08-09 | Stop reason: SDUPTHER

## 2023-08-09 RX ORDER — DOXYCYCLINE HYCLATE 100 MG
100 TABLET ORAL EVERY 12 HOURS
Qty: 1 TABLET | Refills: 0 | Status: ON HOLD
Start: 2023-08-09 | End: 2023-10-03 | Stop reason: HOSPADM

## 2023-08-09 RX ORDER — FAMOTIDINE 10 MG/ML
20 INJECTION INTRAVENOUS 2 TIMES DAILY
Qty: 1 EACH | Refills: 0 | Status: ON HOLD
Start: 2023-08-09 | End: 2023-10-03

## 2023-08-09 RX ORDER — OXYBUTYNIN CHLORIDE 5 MG/1
5 TABLET ORAL 2 TIMES DAILY
Qty: 60 TABLET | Refills: 11 | Status: SHIPPED | OUTPATIENT
Start: 2023-08-09 | End: 2023-08-09 | Stop reason: SDUPTHER

## 2023-08-09 RX ORDER — AMLODIPINE BESYLATE 10 MG/1
10 TABLET ORAL DAILY
Qty: 1 TABLET | Refills: 0 | Status: ON HOLD
Start: 2023-08-10 | End: 2023-10-03 | Stop reason: SDUPTHER

## 2023-08-09 RX ORDER — ENOXAPARIN SODIUM 100 MG/ML
30 INJECTION SUBCUTANEOUS EVERY 12 HOURS
Refills: 0 | Status: ON HOLD
Start: 2023-08-09 | End: 2023-10-03 | Stop reason: SDUPTHER

## 2023-08-09 RX ORDER — CLONIDINE 0.2 MG/24H
1 PATCH, EXTENDED RELEASE TRANSDERMAL
Qty: 4 PATCH | Refills: 11 | Status: ON HOLD
Start: 2023-08-10 | End: 2023-10-03

## 2023-08-09 RX ORDER — ATORVASTATIN CALCIUM 20 MG/1
20 TABLET, FILM COATED ORAL NIGHTLY
Qty: 1 TABLET | Refills: 0 | Status: ON HOLD
Start: 2023-08-09 | End: 2023-10-03 | Stop reason: SDUPTHER

## 2023-08-09 RX ORDER — SODIUM CHLORIDE FOR INHALATION 3 %
4 VIAL, NEBULIZER (ML) INHALATION EVERY 6 HOURS
Qty: 1 ML | Refills: 0 | Status: ON HOLD
Start: 2023-08-09 | End: 2023-10-03

## 2023-08-09 RX ORDER — MICONAZOLE NITRATE 2 %
POWDER (GRAM) TOPICAL 2 TIMES DAILY
Qty: 1 G | Refills: 0 | Status: ON HOLD
Start: 2023-08-09 | End: 2023-10-03

## 2023-08-09 RX ADMIN — GUAIFENESIN 400 MG: 200 SOLUTION ORAL at 02:08

## 2023-08-09 RX ADMIN — SODIUM CHLORIDE SOLN NEBU 3% 4 ML: 3 NEBU SOLN at 02:08

## 2023-08-09 RX ADMIN — OXYCODONE AND ACETAMINOPHEN 1 TABLET: 10; 325 TABLET ORAL at 09:08

## 2023-08-09 RX ADMIN — OXYCODONE AND ACETAMINOPHEN 1 TABLET: 10; 325 TABLET ORAL at 03:08

## 2023-08-09 RX ADMIN — SODIUM CHLORIDE SOLN NEBU 3% 4 ML: 3 NEBU SOLN at 08:08

## 2023-08-09 RX ADMIN — DOXYCYCLINE HYCLATE 100 MG: 100 TABLET, COATED ORAL at 08:08

## 2023-08-09 RX ADMIN — MICONAZOLE NITRATE 2 % TOPICAL POWDER: at 09:08

## 2023-08-09 RX ADMIN — IPRATROPIUM BROMIDE AND ALBUTEROL SULFATE 3 ML: 2.5; .5 SOLUTION RESPIRATORY (INHALATION) at 08:08

## 2023-08-09 RX ADMIN — FENOFIBRATE 48 MG: 48 TABLET, FILM COATED ORAL at 08:08

## 2023-08-09 RX ADMIN — FAMOTIDINE 20 MG: 10 INJECTION, SOLUTION INTRAVENOUS at 08:08

## 2023-08-09 RX ADMIN — SENNOSIDES AND DOCUSATE SODIUM 2 TABLET: 50; 8.6 TABLET ORAL at 08:08

## 2023-08-09 RX ADMIN — CEFTRIAXONE SODIUM 2 G: 2 INJECTION, POWDER, FOR SOLUTION INTRAMUSCULAR; INTRAVENOUS at 12:08

## 2023-08-09 RX ADMIN — CARVEDILOL 25 MG: 12.5 TABLET, FILM COATED ORAL at 08:08

## 2023-08-09 RX ADMIN — GUAIFENESIN 400 MG: 200 SOLUTION ORAL at 05:08

## 2023-08-09 RX ADMIN — GUAIFENESIN 400 MG: 200 SOLUTION ORAL at 09:08

## 2023-08-09 RX ADMIN — ENOXAPARIN SODIUM 30 MG: 30 INJECTION SUBCUTANEOUS at 08:08

## 2023-08-09 RX ADMIN — AMLODIPINE BESYLATE 10 MG: 5 TABLET ORAL at 08:08

## 2023-08-09 RX ADMIN — OXYBUTYNIN CHLORIDE 5 MG: 5 TABLET ORAL at 08:08

## 2023-08-09 NOTE — PROGRESS NOTES
Infectious Diseases Progress Note  58-year-old male with past medical history of T-spine cord injury with paraplegia, diabetes, HTN, hepatitis-C, chronic right ankle wound/osteomyelitis, was on suppressive doxycycline, known to my service, seen by us initially at [facility] and has followed with us at the office for chronic osteomyelitis, is admitted to [facility] on 06/28/2023 presenting through the ED with complaints of pain and swelling to his right knee, apparently struck his knee.  He did also report prior remote right knee surgery.  He was evaluated and noted to have no fevers initially but a temperature of 100.2° today 6/29, no leukocytosis but with thrombocytosis of up to 531 today.  .2 and ESR >130.  He is anemic with low albumin.  Blood cultures have been negative.  CT scan of the right knee noted a 5 cm area of subcutaneous fluid collection in the prepatellar region.  There was aspiration in the ER with findings of purulent fluid and cultures showing group B Streptococcus.  He was seen by the orthopedic surgery team with findings of right prepatellar bursa abscess and is status post incision and drainage of right knee septic arthritis and right prepatellar bursa abscess today 6/29 with group B Streptococcus  He is on ceftriaxone.     Subjective:  Remains in the ICU.  No new complaints, low-grade fevers, doing about the same.  Lying in bed in no acute distress      Past Medical History:   Diagnosis Date    Arthritis     Chronic ulcer of ankle 05/26/2022    Frequent UTI 07/02/2019    Generalized anxiety disorder 05/26/2022    Neurogenic bladder 05/26/2022    Osteomyelitis 05/26/2022    Presence of suprapubic catheter 05/26/2022    Pure hypercholesterolemia 05/26/2022    Retention of urine, unspecified 08/09/2019    Spinal cord injury at T1-T6 level 04/20/2018     Past Surgical History:   Procedure Laterality Date    INCISION AND DRAINAGE, LOWER EXTREMITY Right  6/29/2023    Procedure: INCISION AND DRAINAGE, LOWER EXTREMITY;  Surgeon: Prabhu Shen DO;  Location: [facility] OR;  Service: Orthopedics;  Laterality: Right;  supine bone foam wash stuff cultures    INSERTION OF INTRAMEDULLARY MARISA Right 8/10/2022    Procedure: INSERTION, INTRAMEDULLARY MARISA RIGHT TIBIA;  Surgeon: Jorge Orellana MD;  Location: [facility] OR;  Service: Orthopedics;  Laterality: Right;     Social History     Socioeconomic History    Marital status:    Tobacco Use    Smoking status: Every Day     Current packs/day: 0.00     Types: Cigars, Cigarettes    Smokeless tobacco: Never   Substance and Sexual Activity    Alcohol use: Yes     Alcohol/week: 2.0 standard drinks of alcohol     Types: 2 Cans of beer per week    Drug use: Not Currently    Sexual activity: Never       Review of Systems   Unable to perform ROS: Patient nonverbal         Review of patient's allergies indicates:   Allergen Reactions    Baclofen Itching and Anxiety         Scheduled Meds:   albuterol-ipratropium  3 mL Nebulization BID    amLODIPine  10 mg Per NG tube Daily    atorvastatin  20 mg Per NG tube Nightly    carvediloL  25 mg Per NG tube BID    cefTRIAXone (ROCEPHIN) IVPB  2 g Intravenous Q24H    cloNIDine 0.2 mg/24 hr td ptwk  1 patch Transdermal Q7 Days    doxycycline  100 mg Per NG tube Q12H    enoxaparin  30 mg Subcutaneous Q12H    famotidine (PF)  20 mg Intravenous BID    fenofibrate  48 mg Per NG tube Daily    guaiFENesin 100 mg/5 ml  400 mg Per NG tube Q4H    miconazole NITRATE 2 %   Topical (Top) BID    oxybutynin  5 mg Per NG tube BID    senna-docusate 8.6-50 mg  2 tablet Per NG tube BID    sodium chloride 3%  4 mL Nebulization Q6H    sodium citrate-citric acid 500-334 mg/5 ml  30 mL Oral Once     Continuous Infusions:   clevidipine      dexmedeTOMIDine (Precedex) infusion (titrating) Stopped (08/08/23 1028)    propofoL Stopped (08/07/23 0914)     PRN Meds:0.9%  NaCl infusion (for blood administration), acetaminophen,  "etomidate, [] fentaNYL **FOLLOWED BY** fentaNYL, fentaNYL, hydrALAZINE, HYDROmorphone, labetalol, oxyCODONE-acetaminophen, rocuronium, sodium chloride 0.9%, sodium chloride 0.9%    Objective:  /68   Pulse 99   Temp 99.5 °F (37.5 °C) (Oral)   Resp 20   Ht 5' 9.69" (1.77 m)   Wt 86.2 kg (190 lb)   SpO2 99%   BMI 27.51 kg/m²     Physical Exam:   Physical Exam  Vitals reviewed.   HENT:      Head: Normocephalic.   Cardiovascular:      Rate and Rhythm: Normal rate and regular rhythm.   Pulmonary:      Effort: Pulmonary effort is normal. No respiratory distress.      Breath sounds: B/L BS coarse. No wheezing.      Comments: On vent  Abdominal:      General: Bowel sounds are normal. There is no distension.     Palpations: Abdomen is soft.      Tenderness: There is no abdominal tenderness.   Genitourinary:     Comments: Suprapubic catheter  Musculoskeletal:      Cervical back: Normal range of motion.      Comments: Paraplegic   Skin:     Findings: No rash.      Comments: Wounds dressed   Neurological:      Mental Status:  Sedated on vent  Psychiatric:         Behavior: Not communicative at this time        Imaging  Imaging Results              CT Knee W W/O Contrast Right (Final result)  Result time 23 18:37:42      Final result by Gustavo Cassidy MD (23 18:37:42)                   Impression:      Mildly limited assessment.  5 cm area of subcutaneous fluid in the prepatellar region may have thin peripheral enhancement.  Fluid collection is possible.    Subcutaneous edema in the calf.      Electronically signed by: Gustavo Cassidy  Date:    2023  Time:    18:37               Narrative:    EXAMINATION:  CT KNEE W W/O CONTRAST RIGHT    CLINICAL HISTORY:  possible infection;    TECHNIQUE:  CT imaging of the right knee before and after IV contrast.  Axial, coronal and sagittal reformatted images reviewed.   Dose length product is 585 mGycm. Automatic exposure control, adjustment of mA/kV or " iterative reconstruction technique used to limit radiation dose.    COMPARISON:  Radiograph 06/28/2023    FINDINGS:  Assessment mildly limited by motion.  Joint alignments are maintained with degenerative changes at the knee.  Fixation hardware in the lower femur and tibia with remote proximal fibular fracture.  Areas of heterotopic ossification along the lower portion of the femur.  Small knee effusion.  Areas of subcutaneous edema anteriorly below the knee.  More focal area of fluid in the subcutaneous tissues of the prepatellar region measures up to 5 cm in transverse diameter and 5 cm in length.  There is image noise from the hardware, but this fluid may have thin areas of peripheral enhancement.  No tracking subcutaneous gas.                                       X-Ray Tibia Fibula 2 View Right (Final result)  Result time 06/28/23 18:14:06      Final result by Tracy Zamudio MD (06/28/23 18:14:06)                   Impression:      Posttraumatic and postsurgical changes at the femur and lower leg.  There is chronic deformity at the distal femur with heterogeneous sclerosis and lucency superimposed on background bony demineralization.  No old imaging of this region available for comparison.  This makes it difficult to evaluate for acute superimposed lytic change.    Postsurgical and posttraumatic change at the tibia and fibula with bony demineralization.  No appreciable acute osseous abnormality.      Electronically signed by: Tracy Zamudio  Date:    06/28/2023  Time:    18:14               Narrative:    EXAMINATION:  XR FEMUR 2 VIEW RIGHT; XR TIBIA FIBULA 2 VIEW RIGHT    CLINICAL HISTORY:  possible infection;; infection;    TECHNIQUE:  AP and lateral views of the right femur were performed.    AP and lateral views of the right tibia and fibula were obtained.    COMPARISON:  Knee radiographs 06/28/2023, tibia and fibular radiographs 08/10/2022    FINDINGS:  There are postsurgical changes of ORIF at the  distal femur with lateral plate and screws.  There are postsurgical changes of ORIF at the tibia with antegrade intramedullary raven and proximal and distal interlocking screws.  Hardware appears intact.  No acute fracture seen.  There are old, healed fracture deformities.    There is chronic remodeling at the distal femur with heterogeneous appearance of the marrow and cortex distally.  There are areas of lucency as well as sclerosis.  There is no imaging through the distal femur available for comparison to evaluate for acute lytic changes superimposed on chronic background posttraumatic and postsurgical change.  Older prior radiographs of the tibia and fibula do not adequately imaged the area.  The bones are demineralized.    There is soft tissue swelling at the knee.  There is soft tissue swelling at the ankle.                                       X-Ray Femur 2 View Right (Final result)  Result time 06/28/23 18:14:06      Final result by Tracy Zamudio MD (06/28/23 18:14:06)                   Impression:      Posttraumatic and postsurgical changes at the femur and lower leg.  There is chronic deformity at the distal femur with heterogeneous sclerosis and lucency superimposed on background bony demineralization.  No old imaging of this region available for comparison.  This makes it difficult to evaluate for acute superimposed lytic change.    Postsurgical and posttraumatic change at the tibia and fibula with bony demineralization.  No appreciable acute osseous abnormality.      Electronically signed by: Tracy Zamudio  Date:    06/28/2023  Time:    18:14               Narrative:    EXAMINATION:  XR FEMUR 2 VIEW RIGHT; XR TIBIA FIBULA 2 VIEW RIGHT    CLINICAL HISTORY:  possible infection;; infection;    TECHNIQUE:  AP and lateral views of the right femur were performed.    AP and lateral views of the right tibia and fibula were obtained.    COMPARISON:  Knee radiographs 06/28/2023, tibia and fibular  radiographs 08/10/2022    FINDINGS:  There are postsurgical changes of ORIF at the distal femur with lateral plate and screws.  There are postsurgical changes of ORIF at the tibia with antegrade intramedullary raven and proximal and distal interlocking screws.  Hardware appears intact.  No acute fracture seen.  There are old, healed fracture deformities.    There is chronic remodeling at the distal femur with heterogeneous appearance of the marrow and cortex distally.  There are areas of lucency as well as sclerosis.  There is no imaging through the distal femur available for comparison to evaluate for acute lytic changes superimposed on chronic background posttraumatic and postsurgical change.  Older prior radiographs of the tibia and fibula do not adequately imaged the area.  The bones are demineralized.    There is soft tissue swelling at the knee.  There is soft tissue swelling at the ankle.                                       X-Ray Knee 3 View Right (Final result)  Result time 06/28/23 14:18:03      Final result by Lanette Waller MD (06/28/23 14:18:03)                   Impression:      1. No acute bony abnormality.  2. Soft tissue swelling around the knee.      Electronically signed by: Lanette Waller  Date:    06/28/2023  Time:    14:18               Narrative:    EXAMINATION:  XR KNEE 3 VIEW RIGHT    CLINICAL HISTORY:  Effusion, right knee    COMPARISON:  None.    FINDINGS:  There has been prior internal fixation of the femur and tibia.  There are chronic changes of the bones with heterotopic ossification around the knee.  There is no acute fracture identified.  There is soft tissue swelling around the knee.                                       Lab Review   Recent Results (from the past 24 hour(s))   Comprehensive Metabolic Panel    Collection Time: 08/08/23  1:13 AM   Result Value Ref Range    Sodium Level 138 136 - 145 mmol/L    Potassium Level 3.6 3.5 - 5.1 mmol/L    Chloride 103 98 - 107 mmol/L     Carbon Dioxide 25 22 - 29 mmol/L    Glucose Level 186 (H) 74 - 100 mg/dL    Blood Urea Nitrogen 13.2 8.4 - 25.7 mg/dL    Creatinine 0.68 (L) 0.73 - 1.18 mg/dL    Calcium Level Total 9.2 8.4 - 10.2 mg/dL    Protein Total 7.7 6.4 - 8.3 gm/dL    Albumin Level 2.2 (L) 3.5 - 5.0 g/dL    Globulin 5.5 (H) 2.4 - 3.5 gm/dL    Albumin/Globulin Ratio 0.4 (L) 1.1 - 2.0 ratio    Bilirubin Total 0.3 <=1.5 mg/dL    Alkaline Phosphatase 79 40 - 150 unit/L    Alanine Aminotransferase 19 0 - 55 unit/L    Aspartate Aminotransferase 14 5 - 34 unit/L    eGFR >60 mls/min/1.73/m2   CBC with Differential    Collection Time: 08/08/23  1:13 AM   Result Value Ref Range    WBC 13.50 (H) 4.50 - 11.50 x10(3)/mcL    RBC 3.27 (L) 4.70 - 6.10 x10(6)/mcL    Hgb 8.1 (L) 14.0 - 18.0 g/dL    Hct 25.8 (L) 42.0 - 52.0 %    MCV 78.9 (L) 80.0 - 94.0 fL    MCH 24.8 (L) 27.0 - 31.0 pg    MCHC 31.4 (L) 33.0 - 36.0 g/dL    RDW 20.4 (H) 11.5 - 17.0 %    Platelet 508 (H) 130 - 400 x10(3)/mcL    MPV 10.0 7.4 - 10.4 fL    Neut % 77.9 %    Lymph % 12.3 %    Mono % 8.7 %    Eos % 0.0 %    Basophil % 0.2 %    Lymph # 1.66 0.6 - 4.6 x10(3)/mcL    Neut # 10.52 (H) 2.1 - 9.2 x10(3)/mcL    Mono # 1.17 0.1 - 1.3 x10(3)/mcL    Eos # 0.00 0 - 0.9 x10(3)/mcL    Baso # 0.03 <=0.2 x10(3)/mcL    IG# 0.12 (H) 0 - 0.04 x10(3)/mcL    IG% 0.9 %    NRBC% 0.0 %             Assessment/Plan:  1. SIRS with fevers  2. Group B Streptococcus Right knee prepatellar abscess  3. Group B Streptococcus Right knee septic arthritis  4.  Anemia/reactive thrombocytosis  5.  Paraplegia secondary to T-spine cord injury  6. History of hepatitis-C   7. Chronic right ankle wound/osteomyelitis  8.  Diabetes    9.  Acute kidney injury  10. Acute hypoxic respiratory failure  11. Pulmonary edema with pleural effusions/ Pneumonitis      -Continue ceftriaxone with end date of 08/09.  He is to continue maintenance doxycycline indefinitely for chronic suppression  -Low-grade fevers noted and no  leukocytosis  -8/3 chest x-ray results noted  -S/p therapeutic bronchoscopy for mucus plugging today 8/3  -Stool for c-diff PCR and toxin (-)  -7/4 and 7/8 blood cultures negative and sputum culture with moderate yeast.    -7/4 CT scan of the abdomen and pelvis and 7/3 chest x-ray results noted, likely dealing with pulmonary edema with pleural effusions and possibly superimposed infectious pneumonitis.   -6/28 right knee abscess culture with Group B Streptococcus  -Seen by Orthopedic surgery team s/p I&D of R prepatellar bursa abscess and septic R knee with cultures yielding Group G Streptococcus  -6/28 blood cultures negative  -Multiple comorbidities, paraplegic with chronic pressure wounds, right ankle osteomyelitis with exposed bone on chronic suppressive doxycycline  -We will continue surgical site care per Orthopedic surgery team  -We will continue wound care  -He is to continue management of his hepatitis-C as outpatient  -Renal impairment noted, HD per Nephrology, started 7/4  -7/3 Renal ultrasound results noted  -Re-intubated on 7/21 followed by Bronchoscopy with mucus plugging noted  -S/p tracheostomy and PEG tube placement today 8/6  -Continue vent management and ICU support per Intensivist  -Discussed with nursing staff.

## 2023-08-09 NOTE — DISCHARGE SUMMARY
[facility] - [facility] ICU  Pulmonary Critical Care Note    Patient Name: Bianca Khan  MRN: [id number]  Admission Date: 6/28/2023  Hospital Length of Stay: 42 days  Code Status: Full Code  Attending Provider: Luis Mcclure MD  Primary Care Provider: Areli Vargas PA-C     Subjective:     HPI:   Bianca Khan is a 58 y.o. male with PMH of paraplegia 2/2 spinal cord injury (T1-T6), neurogenic bladder with suprapubic catheter, chronic right ankle osteomyelitis, DM II, HTN, who presented to the ED on 6/28/2023 with CC of right knee pain. Per chart review, CT of right knee revealed 5 cm area of subcutaneous fluid in prepatellar region which was aspirated in the ED; he was taken to the OR on 6/29/2023 by orthopedic surgery team and was found to have prepatellar bursa infection and septic arthritis. Wound culture 6/29/2023 positive for strep agalactiae. Orthopedic surgery team recommended right-sided above-knee amputation d/t chronically infected hardware in right lower extremity. On 7/4/2023, a RRT was called d/t acute respiratory distress that was not improving despite being placed on vapotherm at 35 L/min with FiO2 100%. At the time of examination, patient's initial GCS was 10 but progressively reduced to a GCS of 6. Shared decision with patient's wife was made to intubate patient for airway protection though ABGs were within normal limits. He was admitted to the ICU for close monitoring and further medical management.  The patient underwent incision and drainage of his right knee on 06/29.  He was admitted to the ICU and intubated for airway protection due to altered mental status on 7/4 with placement of hemodialysis catheter with initiation of hemodialysis.  Patient was subsequently extubated on 07/18 for the 2nd time.  He was reintubated on 07/21 secondary to worsening hypoxia and altered mental status with associated mucus plugging.  Status post bronchoscopy x4 since admission to the ICU  for hypoxia associated with mucus plugging.  He has been off of hemodialysis for several days now.      24 Hour Interval History:  No acute events documented overnight. Elevated SBP to max of 160s, vital signs stable otherwise. Continue IV antibiotics for septic arthritis with wound VAC in place. Continue TF 60 cc/hr, tolerating trach and PEG well. Resting in bed this AM, follows commands and able to answer yes and no questions with head shakes and nods. Urine output of 1.43L in last 24 hours. LTAC placement pending, per CM note patient was accepted yesterday with likely transfer today.      Past Medical History:   Diagnosis Date    Arthritis     Chronic ulcer of ankle 05/26/2022    Frequent UTI 07/02/2019    Generalized anxiety disorder 05/26/2022    Neurogenic bladder 05/26/2022    Osteomyelitis 05/26/2022    Presence of suprapubic catheter 05/26/2022    Pure hypercholesterolemia 05/26/2022    Retention of urine, unspecified 08/09/2019    Spinal cord injury at T1-T6 level 04/20/2018     Past Surgical History:   Procedure Laterality Date    INCISION AND DRAINAGE, LOWER EXTREMITY Right 6/29/2023    Procedure: INCISION AND DRAINAGE, LOWER EXTREMITY;  Surgeon: Prabhu Shen DO;  [place] [facility];  Service: Orthopedics;  Laterality: Right;  supine bone foam wash stuff cultures    INSERTION OF INTRAMEDULLARY MARISA Right 8/10/2022    Procedure: INSERTION, INTRAMEDULLARY MARISA RIGHT TIBIA;  Surgeon: Jorge Orellana MD;  Location[place] [facility];  Service: Orthopedics;  Laterality: Right;     Social History     Socioeconomic History    Marital status:    Tobacco Use    Smoking status: Every Day     Current packs/day: 0.00     Types: Cigars, Cigarettes    Smokeless tobacco: Never   Substance and Sexual Activity    Alcohol use: Yes     Alcohol/week: 2.0 standard drinks of alcohol     Types: 2 Cans of beer per week    Drug use: Not Currently    Sexual activity: Never       Current Inpatient Medications   albuterol-ipratropium   3 mL Nebulization BID    amLODIPine  10 mg Per NG tube Daily    atorvastatin  20 mg Per NG tube Nightly    carvediloL  25 mg Per NG tube BID    cloNIDine 0.2 mg/24 hr td ptwk  1 patch Transdermal Q7 Days    doxycycline  100 mg Per NG tube Q12H    enoxaparin  30 mg Subcutaneous Q12H    famotidine (PF)  20 mg Intravenous BID    fenofibrate  48 mg Per NG tube Daily    guaiFENesin 100 mg/5 ml  400 mg Per NG tube Q4H    miconazole NITRATE 2 %   Topical (Top) BID    oxybutynin  5 mg Per NG tube BID    senna-docusate 8.6-50 mg  2 tablet Per NG tube BID    sodium chloride 3%  4 mL Nebulization Q6H    sodium citrate-citric acid 500-334 mg/5 ml  30 mL Oral Once         Objective:       Intake/Output Summary (Last 24 hours) at 8/9/2023 1038  Last data filed at 8/9/2023 0600  Gross per 24 hour   Intake 1732 ml   Output 1475 ml   Net 257 ml       Vital Signs (Most Recent):  Temp: 98.6 °F (37 °C) (08/09/23 0900)  Pulse: 98 (08/09/23 1000)  Resp: 20 (08/09/23 1000)  BP: (!) 143/92 (08/09/23 1000)  SpO2: 98 % (08/09/23 1000)  Body mass index is 27.51 kg/m².  Weight: 86.2 kg (190 lb) Vital Signs (24h Range):  Temp:  [98.6 °F (37 °C)-99.5 °F (37.5 °C)] 98.6 °F (37 °C)  Pulse:  [] 98  Resp:  [12-26] 20  SpO2:  [91 %-100 %] 98 %  BP: (112-178)/() 143/92       Assessment/Plan:     Assessment  Septic arthritis of the right knee with Strep agalactiae s/p I&D on 06/29/2023 with wound VAC in place currently on doxycycline for suppressive therapy  Acute hypoxemic respiratory failure with associated pneumonia in addition to pulmonary edema on mechanical ventilation s/p Trach and PEG on 8/7/23  S/P bronchoscopy on 8/5 2/2 desaturation revealing complete obstruction of the right lung with complete impaction of mucus and all subsegments including the right mainstem bronchus.  ANTON with CKD 4 s/p multiple sessions HD currently not receiving hemodialysis   Hypoactive delirium   Paroxysmal a-fib with permanent pacemaker in place   Hx of  paraplegia secondary to spinal cord injury at level T1 with associated neurogenic bladder and chronic right ankle osteomyelitis  DM II  HTN      Plan  Discharge to LTAC      Violeta Morton, ANP   [facility]   Abdominal Pain, N/V/D

## 2023-08-09 NOTE — NURSING
Nurses Note -- 4 Eyes      8/8/2023   7:10 PM      Skin assessed during: Q Shift Change      [] No Altered Skin Integrity Present    [x]Prevention Measures Documented      [x] Yes- Altered Skin Integrity Present or Discovered   [] LDA Added if Not in Epic (Describe Wound)   [] New Altered Skin Integrity was Present on Admit and Documented in LDA   [] Wound Image Taken    Wound Care Consulted? Yes    Attending Nurse:  Amauri Bernabe RN/Staff Member:  Carlita Mathis RN

## 2023-08-09 NOTE — PLAN OF CARE
Patient will be admitted to [facility] today, transportation has been set up with [facility].

## 2023-08-09 NOTE — NURSING
Nurses Note -- 4 Eyes      8/9/2023   9:42 AM      Skin assessed during: Q Shift Change      [] No Altered Skin Integrity Present    []Prevention Measures Documented      [x] Yes- Altered Skin Integrity Present or Discovered   [] LDA Added if Not in Epic (Describe Wound)   [] New Altered Skin Integrity was Present on Admit and Documented in LDA   [] Wound Image Taken    Wound Care Consulted? Yes    Attending Nurse:  Marco Bernabe RN/Staff Member:   Angelica Martinez RN

## 2023-08-13 LAB — MYCOBACTERIUM SPEC QL CULT: NORMAL

## 2023-08-23 NOTE — OP NOTE
[facility]   Endotracheal Intubation  Procedure Note    SUMMARY     Date of Procedure:  07/04/23    Procedure: oral ET intubation    Performed by: Ingrid Treadwell MD, [facility]      Pre-Operative Diagnosis: acute respiratory failure    Post-Operative Diagnosis: acute respiratory failure    Anesthesia: none      Description of the Findings of the Procedure:   Patient arrived to ICU emergently on transfer from floor hospital bed with SaO2 in 70s and respiratory distress.  No informed consent obtained due to emergent need. He was orally intubated without complications, and placed on mechanical ventilation.        Ingrid Treadwell MD, [facility]  Pulmonary/Critical Care

## 2023-09-12 NOTE — PHYSICIAN QUERY
"PT Name: Bianca Khan  MR #: [id number]    DOCUMENTATION CLARIFICATION     CDS/: Sofiya Gardiner RN          Contact information: [email or web address]    This form is a permanent document in the medical record.     Query Date: September 12, 2023    By submitting this query, we are merely seeking further clarification of documentation.. Please utilize your independent clinical judgment when addressing the question(s) below.    The medical record contains the following:   Indicators  Supporting Clinical Findings Location in Medical Record   x Registered Dietician Diagnosis  Malnutrition Context: chronic illness  Malnutrition Level:  (does not meet criteria)  8/7 RD PN    Energy Intake      Weight Loss     x Fat Loss  Subcutaneous Fat (Malnutrition):  (does not meet criteria)  8/7 RD PN   x Muscle Loss  Muscle Mass (Malnutrition):  (does not meet criteria)  8/7 RD PN    Edema/Fluid Accumulation      Reduced  Strength (by dynamometer)     x Weight, BMI, Usual Body Weight  Height: 5' 9.69" (177 cm) Height Method: Stated  Last Weight: 86.2 kg (190 lb) (07/04/23 1114) Weight Method: Standard Scale (stated)  BMI (Calculated): 27.5  BMI Classification: overweight (BMI 25-29.9)  Ideal Body Weight (IBW), Male: 164.14 lb  % Ideal Body Weight, Male (lb): 115.75 %    8/7 RD PN    Delayed Wound Healing     x Acute or Chronic Illness  Bianca Khan is a 58 y.o. male with PMH of paraplegia 2/2 spinal cord injury (T1-T6), neurogenic bladder with suprapubic catheter, chronic right ankle osteomyelitis, DM II, HTN, who presented to the ED on 6/28/2023 with CC of right knee pain. Per chart review, CT of right knee revealed 5 cm area of subcutaneous fluid in prepatellar region which was aspirated in the ED; he was taken to the OR on 6/29/2023 by orthopedic surgery team and was found to have prepatellar bursa infection and septic arthritis. Wound culture 6/29/2023 positive for strep agalactiae. Orthopedic surgery team " recommended right-sided above-knee amputation d/t chronically infected hardware in right lower extremity. On 7/4/2023, a RRT was called d/t acute respiratory distress that was not improving despite being placed on vapotherm at 35 L/min with FiO2 100%. At the time of examination, patient's initial GCS was 10 but progressively reduced to a GCS of 6.    Moderate protein calorie malnutrition   Severe protein calorie malnutrition  8/9 DS                            8/1 Nephrology PN  8/6 Op Note    Social or Environmental Circumstances      Treatment     x Other    Wt Readings from Last 5 Encounters:   07/04/23 86.2 kg (190 lb)   09/13/22 86.2 kg (190 lb)   08/25/22 86.2 kg (190 lb 0.6 oz)   01/04/22 86.2 kg (189 lb 15.9 oz)     8/7 RD PN     Academy of Nutrition and Dietetics (Academy) and the American Society for Parenteral and Enteral Nutrition (A.S.P.E.N.) Clinical Characteristics to support Malnutrition      Criteria for mild malnutrition is defined as 1 characteristic outlined above within the established moderate or severe parameters.  A minimum of 2 out of the 6 characteristics noted above are recommended for a diagnosis of moderate or severe malnutrition.  Chronic illness/injury is a disease/condition lasting 3 months or longer.    The noted clinical guidelines are only system guidelines and do not replace the providers clinical judgment.    Provider, please specify diagnosis or diagnoses associated with above clinical findings.    [  X]  Malnutrition ruled out   [  ] Other Nutritional Diagnosis (please specify): _______     Please document in your progress notes daily for the duration of treatment until resolved and  include in your discharge summary.      References:    KOJO Andrea, & DAHLIA Romero. (2022, April). Assessment and management of anorexia and cachexia in palliative care. Retrieved May 23, 2022, from  [email or web address]-and-management-[email or web address]-[email or web address]-cachexia-[email or web address]-[email or web address]-care?[email or web address]=see_link     LYNDSEY Rodrigues, PhD, RD, Jared TOLEDO P., PhD, RN, ARDEN Murillo MD, PhD, Samantha OROURKE A., MS, RD, [facility], GADIEL Esquivel, MS, RD, The Academy Malnutrition Work Group, The A.S.P.E.N. Board of Directors. (2012). Consensus Statement: Academy of Nutrition and Dietetics and American Society for Parenteral and Enteral Nutrition: Characteristics Recommended for the Identification and Documentation of Adult Malnutrition (Undernutrition). Journal of Parenteral and Enteral Nutrition, 36(3), 275-283. doi:10.1177/2877779487796313     Form No. [id number]

## 2023-09-22 ENCOUNTER — APPOINTMENT (OUTPATIENT)
Dept: RADIOLOGY | Facility: HOSPITAL | Age: 58
End: 2023-09-22
Payer: MEDICARE

## 2023-09-22 PROCEDURE — 73700 CT LOWER EXTREMITY W/O DYE: CPT | Mod: TC,RT

## 2023-10-23 ENCOUNTER — LAB REQUISITION (OUTPATIENT)
Dept: LAB | Facility: HOSPITAL | Age: 58
End: 2023-10-23
Payer: MEDICARE

## 2023-10-23 DIAGNOSIS — E11.9 TYPE 2 DIABETES MELLITUS WITHOUT COMPLICATIONS: ICD-10-CM

## 2023-10-23 DIAGNOSIS — I10 ESSENTIAL (PRIMARY) HYPERTENSION: ICD-10-CM

## 2023-10-23 DIAGNOSIS — B95.62 METHICILLIN RESISTANT STAPHYLOCOCCUS AUREUS INFECTION AS THE CAUSE OF DISEASES CLASSIFIED ELSEWHERE: ICD-10-CM

## 2023-10-23 DIAGNOSIS — I49.5 SICK SINUS SYNDROME: ICD-10-CM

## 2023-10-23 LAB
ALBUMIN SERPL-MCNC: 3.2 G/DL (ref 3.5–5)
ALBUMIN/GLOB SERPL: 0.7 RATIO (ref 1.1–2)
ALP SERPL-CCNC: 76 UNIT/L (ref 40–150)
ALT SERPL-CCNC: 13 UNIT/L (ref 0–55)
AST SERPL-CCNC: 21 UNIT/L (ref 5–34)
BASOPHILS # BLD AUTO: 0.03 X10(3)/MCL
BASOPHILS NFR BLD AUTO: 0.3 %
BILIRUB SERPL-MCNC: 0.2 MG/DL
BUN SERPL-MCNC: 16.2 MG/DL (ref 8.4–25.7)
CALCIUM SERPL-MCNC: 9.2 MG/DL (ref 8.4–10.2)
CHLORIDE SERPL-SCNC: 102 MMOL/L (ref 98–107)
CO2 SERPL-SCNC: 25 MMOL/L (ref 22–29)
CREAT SERPL-MCNC: 1.67 MG/DL (ref 0.73–1.18)
CRP SERPL-MCNC: 6.4 MG/L
EOSINOPHIL # BLD AUTO: 0.13 X10(3)/MCL (ref 0–0.9)
EOSINOPHIL NFR BLD AUTO: 1.4 %
ERYTHROCYTE [DISTWIDTH] IN BLOOD BY AUTOMATED COUNT: 19.6 % (ref 11.5–17)
ERYTHROCYTE [SEDIMENTATION RATE] IN BLOOD: 74 MM/HR (ref 0–15)
GFR SERPLBLD CREATININE-BSD FMLA CKD-EPI: 47 MLS/MIN/1.73/M2
GLOBULIN SER-MCNC: 4.5 GM/DL (ref 2.4–3.5)
GLUCOSE SERPL-MCNC: 147 MG/DL (ref 74–100)
HCT VFR BLD AUTO: 37 % (ref 42–52)
HGB BLD-MCNC: 11.4 G/DL (ref 14–18)
IMM GRANULOCYTES # BLD AUTO: 0.06 X10(3)/MCL (ref 0–0.04)
IMM GRANULOCYTES NFR BLD AUTO: 0.6 %
LYMPHOCYTES # BLD AUTO: 3.83 X10(3)/MCL (ref 0.6–4.6)
LYMPHOCYTES NFR BLD AUTO: 40.1 %
MCH RBC QN AUTO: 25.3 PG (ref 27–31)
MCHC RBC AUTO-ENTMCNC: 30.8 G/DL (ref 33–36)
MCV RBC AUTO: 82 FL (ref 80–94)
MONOCYTES # BLD AUTO: 0.5 X10(3)/MCL (ref 0.1–1.3)
MONOCYTES NFR BLD AUTO: 5.2 %
NEUTROPHILS # BLD AUTO: 5.01 X10(3)/MCL (ref 2.1–9.2)
NEUTROPHILS NFR BLD AUTO: 52.4 %
NRBC BLD AUTO-RTO: 0 %
PLATELET # BLD AUTO: 508 X10(3)/MCL (ref 130–400)
PMV BLD AUTO: 9.4 FL (ref 7.4–10.4)
POTASSIUM SERPL-SCNC: 4.7 MMOL/L (ref 3.5–5.1)
PROT SERPL-MCNC: 7.7 GM/DL (ref 6.4–8.3)
RBC # BLD AUTO: 4.51 X10(6)/MCL (ref 4.7–6.1)
SODIUM SERPL-SCNC: 136 MMOL/L (ref 136–145)
WBC # SPEC AUTO: 9.56 X10(3)/MCL (ref 4.5–11.5)

## 2023-10-23 PROCEDURE — 85652 RBC SED RATE AUTOMATED: CPT | Performed by: INTERNAL MEDICINE

## 2023-10-23 PROCEDURE — 85025 COMPLETE CBC W/AUTO DIFF WBC: CPT | Performed by: INTERNAL MEDICINE

## 2023-10-23 PROCEDURE — 86140 C-REACTIVE PROTEIN: CPT | Performed by: INTERNAL MEDICINE

## 2023-10-23 PROCEDURE — 80053 COMPREHEN METABOLIC PANEL: CPT | Performed by: INTERNAL MEDICINE

## 2023-10-24 ENCOUNTER — OFFICE VISIT (OUTPATIENT)
Dept: ORTHOPEDICS | Facility: CLINIC | Age: 58
End: 2023-10-24
Payer: MEDICARE

## 2023-10-24 VITALS
DIASTOLIC BLOOD PRESSURE: 97 MMHG | BODY MASS INDEX: 26.22 KG/M2 | SYSTOLIC BLOOD PRESSURE: 150 MMHG | WEIGHT: 177 LBS | HEIGHT: 69 IN | HEART RATE: 93 BPM

## 2023-10-24 DIAGNOSIS — M70.41 BURSITIS, PREPATELLAR, RIGHT: Primary | ICD-10-CM

## 2023-10-24 PROCEDURE — 99213 PR OFFICE/OUTPT VISIT, EST, LEVL III, 20-29 MIN: ICD-10-PCS | Mod: ,,, | Performed by: ORTHOPAEDIC SURGERY

## 2023-10-24 PROCEDURE — 99213 OFFICE O/P EST LOW 20 MIN: CPT | Mod: ,,, | Performed by: ORTHOPAEDIC SURGERY

## 2023-10-24 NOTE — PROGRESS NOTES
Subjective:       Patient ID: Bianca Khan is a 58 y.o. male.  Chief Complaint   Patient presents with    Right Knee - Post-op Evaluation     3.5 MONTH F/U FROM I&D RIGHT PRE PATELLAR BURSA ABSCESS. NO COMPLAINTS.        HPI:  Patient has a longstanding history with the lower extremity paralysis right lower extremity wounds including a right septic knee     Prepatellar bursitis which was washed out in the hospital wound VAC to close.  He is now out of rehab.  He appears to be doing well minimal pain no fevers no chills no signs of infection    ROS:  Constitutional: Denies fever chills  Eyes: No change in vision  ENT: No ringing or current infections  CV: No chest pain  Resp: No labored breathing  MSK: Pain evident at site of injury located in HPI,   Integ: No signs of abrasions or lacerations  Neuro: No numbness or tingling  Lymphatic: No swelling outside the area of injury     Current Outpatient Medications on File Prior to Visit   Medication Sig Dispense Refill    ALPRAZolam (XANAX) 0.25 MG tablet Take 1 tablet (0.25 mg total) by mouth 2 (two) times daily as needed for Anxiety (PRN Anxiety). 12 tablet 0    amLODIPine (NORVASC) 10 MG tablet Take 1 tablet (10 mg total) by mouth once daily. 30 tablet 0    atorvastatin (LIPITOR) 20 MG tablet Take 1 tablet (20 mg total) by mouth nightly. 30 tablet 0    carvediloL (COREG) 25 MG tablet Take 1 tablet (25 mg total) by mouth 2 (two) times daily. 30 tablet 0    celecoxib (CELEBREX) 200 MG capsule Take 1 capsule (200 mg total) by mouth once daily. 30 capsule 0    cloNIDine (CATAPRES) 0.1 MG tablet Take 1 tablet (0.1 mg total) by mouth 3 (three) times daily. 90 tablet 0    docusate sodium (COLACE) 100 MG capsule Take 1 capsule (100 mg total) by mouth 2 (two) times daily. 30 capsule 0    doxycycline (VIBRA-TABS) 100 MG tablet Take 1 tablet (100 mg total) by mouth every 12 (twelve) hours. 60 tablet 0    famotidine (PEPCID) 20 MG tablet Take 1 tablet (20 mg total) by mouth 2  "(two) times daily. 60 tablet 0    fenofibrate (TRICOR) 48 MG tablet Take 1 tablet (48 mg total) by mouth once daily. 30 tablet 0    FLUoxetine 20 MG capsule 1 capsule (20 mg total) by Per G Tube route once daily. 30 capsule 0    hydrALAZINE (APRESOLINE) 100 MG tablet 1 tablet (100 mg total) by Per G Tube route every 8 (eight) hours. 90 tablet 0    hydrOXYzine pamoate (VISTARIL) 50 MG Cap Take 1 capsule (50 mg total) by mouth every evening. 30 capsule 0    lisinopriL (PRINIVIL,ZESTRIL) 20 MG tablet Take 1 tablet (20 mg total) by mouth once daily. 90 tablet 0    oxybutynin (DITROPAN) 5 MG Tab 1 tablet (5 mg total) by Per NG tube route 2 (two) times daily. 60 tablet 0    oxyCODONE-acetaminophen (PERCOCET)  mg per tablet Take 1 tablet by mouth every 6 (six) hours as needed for Pain. 12 tablet 0    traZODone (DESYREL) 100 MG tablet 1 tablet (100 mg total) by Per G Tube route every evening. 30 tablet 0     No current facility-administered medications on file prior to visit.          Objective:      BP (!) 150/97   Pulse 93   Ht 5' 9" (1.753 m)   Wt 80.3 kg (177 lb)   BMI 26.14 kg/m²   General the patient is alert and oriented x3 no acute distress nontoxic-appearing appropriate affect.    Constitutional: Vital signs are examined and stable.  Resp: No signs of labored breathing    RLE: -Skin:  No draining sinus tract of the right knee No signs of new abrasions or lacerations, no scars           -MSK: :  Patient is paralyzed           -Neuro:  Patient is paralyzed           -Lymphatic: No signs of lymphadenopathy           -CV: Capillary refill is less than 2 seconds. DP/PT pulses  2/4. Compartments soft and compressible.   Body mass index is 26.14 kg/m².  Ideal body weight: 70.7 kg (155 lb 13.8 oz)  Adjusted ideal body weight: 74.5 kg (164 lb 5.1 oz)  Hemoglobin A1c   Date Value Ref Range Status   03/12/2020 8.8 (H) 4.2 - 6.3 % Final   03/02/2020 8.3 (H) 4.2 - 6.3 % Final     Hgb   Date Value Ref Range Status "   10/23/2023 11.4 (L) 14.0 - 18.0 g/dL Final   10/02/2023 9.5 (L) 14.0 - 18.0 g/dL Final   07/13/2022 Trace-intact (A) Negative Final     Vit D 25 OH   Date Value Ref Range Status   12/30/2021 27.2 (L) 30.0 - 80.0 ng/mL Final   06/10/2021 22.5 (L) 30.0 - 100.0 ng/mL Final     WBC   Date Value Ref Range Status   10/23/2023 9.56 4.50 - 11.50 x10(3)/mcL Final   10/02/2023 7.16 4.50 - 11.50 x10(3)/mcL Final       Radiology:         Assessment:         1. Bursitis, prepatellar, right                Plan:         No follow-ups on file.    There are no diagnoses linked to this encounter.    Patient had a right prepatellar bursitis and infection of the right knee.  Recommended amputation patient declined multiple codes in the hospital went to rehab he is a long-standing paraplegic.  He does have a opening wound on the right ankle which wound care is taken care of.  My recommendation still amputation if patient has continued infection.  At this time he appears nontoxic not willing for amputation we will continue wound care he says it is getting better.  Patient understands risks and benefits of following our instructions.  He is very pleasant he will follow up as needed      This note/OR report was created with the assistance of  voice recognition software or phone  dictation.  There may be transcription errors as a result of using this technology however minimal. Effort has been made to assure accuracy of transcription but any obvious errors or omissions should be clarified with the author of the document.     Prabhu Shen DO  Orthopedic Trauma Surgery  10/24/2023      No future appointments.

## 2023-11-29 ENCOUNTER — HOSPITAL ENCOUNTER (INPATIENT)
Facility: HOSPITAL | Age: 58
LOS: 9 days | Discharge: NURSING, REHAB OR LONG-TERM CARE | DRG: 485 | End: 2023-12-08
Attending: EMERGENCY MEDICINE | Admitting: INTERNAL MEDICINE
Payer: MEDICARE

## 2023-11-29 ENCOUNTER — TELEPHONE (OUTPATIENT)
Dept: ORTHOPEDICS | Facility: CLINIC | Age: 58
End: 2023-11-29
Payer: MEDICARE

## 2023-11-29 DIAGNOSIS — M00.9 CHRONIC INFECTION OF RIGHT KNEE: ICD-10-CM

## 2023-11-29 DIAGNOSIS — M00.9 PYOGENIC ARTHRITIS OF RIGHT KNEE JOINT, DUE TO UNSPECIFIED ORGANISM: Primary | ICD-10-CM

## 2023-11-29 DIAGNOSIS — I48.91 ATRIAL FIBRILLATION: ICD-10-CM

## 2023-11-29 DIAGNOSIS — M25.561 PAIN AND SWELLING OF KNEE, RIGHT: ICD-10-CM

## 2023-11-29 DIAGNOSIS — M71.061 ABSCESS OF BURSA OF RIGHT KNEE: ICD-10-CM

## 2023-11-29 DIAGNOSIS — M25.461 PAIN AND SWELLING OF KNEE, RIGHT: ICD-10-CM

## 2023-11-29 LAB
ALBUMIN SERPL-MCNC: 2.7 G/DL (ref 3.5–5)
ALBUMIN/GLOB SERPL: 0.6 RATIO (ref 1.1–2)
ALLENS TEST BLOOD GAS (OHS): ABNORMAL
ALP SERPL-CCNC: 78 UNIT/L (ref 40–150)
ALT SERPL-CCNC: 13 UNIT/L (ref 0–55)
AST SERPL-CCNC: 22 UNIT/L (ref 5–34)
BASE EXCESS BLD CALC-SCNC: -1.2 MMOL/L (ref -2–2)
BASOPHILS # BLD AUTO: 0.03 X10(3)/MCL
BASOPHILS NFR BLD AUTO: 0.2 %
BILIRUB SERPL-MCNC: 0.4 MG/DL
BLOOD GAS SAMPLE TYPE (OHS): ABNORMAL
BUN SERPL-MCNC: 12.6 MG/DL (ref 8.4–25.7)
CA-I BLD-SCNC: 1.21 MMOL/L (ref 1.12–1.23)
CALCIUM SERPL-MCNC: 8.7 MG/DL (ref 8.4–10.2)
CHLORIDE SERPL-SCNC: 109 MMOL/L (ref 98–107)
CO2 BLDA-SCNC: 24.8 MMOL/L
CO2 SERPL-SCNC: 16 MMOL/L (ref 22–29)
COHGB MFR BLDA: 5.3 % (ref 0.5–1.5)
CREAT SERPL-MCNC: 1.29 MG/DL (ref 0.73–1.18)
CRP SERPL-MCNC: 211.1 MG/L
DRAWN BY BLOOD GAS (OHS): ABNORMAL
EOSINOPHIL # BLD AUTO: 0 X10(3)/MCL (ref 0–0.9)
EOSINOPHIL NFR BLD AUTO: 0 %
ERYTHROCYTE [DISTWIDTH] IN BLOOD BY AUTOMATED COUNT: 19.5 % (ref 11.5–17)
ERYTHROCYTE [SEDIMENTATION RATE] IN BLOOD: 65 MM/HR (ref 0–15)
EST. AVERAGE GLUCOSE BLD GHB EST-MCNC: 125.5 MG/DL
GFR SERPLBLD CREATININE-BSD FMLA CKD-EPI: >60 MLS/MIN/1.73/M2
GLOBULIN SER-MCNC: 4.2 GM/DL (ref 2.4–3.5)
GLUCOSE SERPL-MCNC: 140 MG/DL (ref 74–100)
HBA1C MFR BLD: 6 %
HCO3 BLDA-SCNC: 23.6 MMOL/L (ref 22–26)
HCT VFR BLD AUTO: 36.8 % (ref 42–52)
HGB BLD-MCNC: 11.1 G/DL (ref 14–18)
IMM GRANULOCYTES # BLD AUTO: 0.1 X10(3)/MCL (ref 0–0.04)
IMM GRANULOCYTES NFR BLD AUTO: 0.6 %
LACTATE SERPL-SCNC: 2.2 MMOL/L (ref 0.5–2.2)
LYMPHOCYTES # BLD AUTO: 3.66 X10(3)/MCL (ref 0.6–4.6)
LYMPHOCYTES NFR BLD AUTO: 21.2 %
MCH RBC QN AUTO: 25 PG (ref 27–31)
MCHC RBC AUTO-ENTMCNC: 30.2 G/DL (ref 33–36)
MCV RBC AUTO: 82.9 FL (ref 80–94)
METHGB MFR BLDA: 0.5 % (ref 0.4–1.5)
MONOCYTES # BLD AUTO: 1.59 X10(3)/MCL (ref 0.1–1.3)
MONOCYTES NFR BLD AUTO: 9.2 %
NEUTROPHILS # BLD AUTO: 11.9 X10(3)/MCL (ref 2.1–9.2)
NEUTROPHILS NFR BLD AUTO: 68.8 %
NRBC BLD AUTO-RTO: 0 %
O2 HB BLOOD GAS (OHS): 91.3 % (ref 94–97)
OXYHGB MFR BLDA: 10.1 G/DL (ref 12–16)
PCO2 BLDA: 39 MMHG (ref 35–45)
PH BLDA: 7.39 [PH] (ref 7.35–7.45)
PLATELET # BLD AUTO: 398 X10(3)/MCL (ref 130–400)
PMV BLD AUTO: 9 FL (ref 7.4–10.4)
PO2 BLDA: 75 MMHG (ref 80–100)
POCT GLUCOSE: 169 MG/DL (ref 70–110)
POTASSIUM BLOOD GAS (OHS): 4.2 MMOL/L (ref 3.5–5)
POTASSIUM SERPL-SCNC: 4.7 MMOL/L (ref 3.5–5.1)
PROT SERPL-MCNC: 6.9 GM/DL (ref 6.4–8.3)
RBC # BLD AUTO: 4.44 X10(6)/MCL (ref 4.7–6.1)
SAMPLE SITE BLOOD GAS (OHS): ABNORMAL
SAO2 % BLDA: 94.7 %
SODIUM BLOOD GAS (OHS): 132 MMOL/L (ref 137–145)
SODIUM SERPL-SCNC: 135 MMOL/L (ref 136–145)
WBC # SPEC AUTO: 17.28 X10(3)/MCL (ref 4.5–11.5)

## 2023-11-29 PROCEDURE — 87040 BLOOD CULTURE FOR BACTERIA: CPT | Performed by: NURSE PRACTITIONER

## 2023-11-29 PROCEDURE — 87077 CULTURE AEROBIC IDENTIFY: CPT | Performed by: EMERGENCY MEDICINE

## 2023-11-29 PROCEDURE — 86140 C-REACTIVE PROTEIN: CPT | Performed by: EMERGENCY MEDICINE

## 2023-11-29 PROCEDURE — 25000003 PHARM REV CODE 250: Performed by: EMERGENCY MEDICINE

## 2023-11-29 PROCEDURE — 85025 COMPLETE CBC W/AUTO DIFF WBC: CPT | Performed by: NURSE PRACTITIONER

## 2023-11-29 PROCEDURE — 99900035 HC TECH TIME PER 15 MIN (STAT)

## 2023-11-29 PROCEDURE — 63600175 PHARM REV CODE 636 W HCPCS: Performed by: NURSE PRACTITIONER

## 2023-11-29 PROCEDURE — 63600175 PHARM REV CODE 636 W HCPCS: Performed by: EMERGENCY MEDICINE

## 2023-11-29 PROCEDURE — 96365 THER/PROPH/DIAG IV INF INIT: CPT

## 2023-11-29 PROCEDURE — 83036 HEMOGLOBIN GLYCOSYLATED A1C: CPT | Performed by: NURSE PRACTITIONER

## 2023-11-29 PROCEDURE — 99291 CRITICAL CARE FIRST HOUR: CPT

## 2023-11-29 PROCEDURE — 36600 WITHDRAWAL OF ARTERIAL BLOOD: CPT

## 2023-11-29 PROCEDURE — 96361 HYDRATE IV INFUSION ADD-ON: CPT

## 2023-11-29 PROCEDURE — 80053 COMPREHEN METABOLIC PANEL: CPT | Performed by: NURSE PRACTITIONER

## 2023-11-29 PROCEDURE — 11000001 HC ACUTE MED/SURG PRIVATE ROOM

## 2023-11-29 PROCEDURE — 85652 RBC SED RATE AUTOMATED: CPT | Performed by: EMERGENCY MEDICINE

## 2023-11-29 PROCEDURE — 83605 ASSAY OF LACTIC ACID: CPT | Performed by: NURSE PRACTITIONER

## 2023-11-29 PROCEDURE — 82803 BLOOD GASES ANY COMBINATION: CPT

## 2023-11-29 PROCEDURE — 96375 TX/PRO/DX INJ NEW DRUG ADDON: CPT

## 2023-11-29 RX ORDER — INSULIN ASPART 100 [IU]/ML
1-10 INJECTION, SOLUTION INTRAVENOUS; SUBCUTANEOUS
Status: DISCONTINUED | OUTPATIENT
Start: 2023-11-29 | End: 2023-12-08 | Stop reason: HOSPADM

## 2023-11-29 RX ORDER — GLUCAGON 1 MG
1 KIT INJECTION
Status: DISCONTINUED | OUTPATIENT
Start: 2023-11-29 | End: 2023-12-08 | Stop reason: HOSPADM

## 2023-11-29 RX ORDER — GENTAMICIN SULFATE 1 MG/G
OINTMENT TOPICAL 3 TIMES DAILY
Status: ON HOLD | COMMUNITY
End: 2024-01-16

## 2023-11-29 RX ORDER — IBUPROFEN 200 MG
16 TABLET ORAL
Status: DISCONTINUED | OUTPATIENT
Start: 2023-11-29 | End: 2023-12-08 | Stop reason: HOSPADM

## 2023-11-29 RX ORDER — CARVEDILOL 12.5 MG/1
12.5 TABLET ORAL 2 TIMES DAILY WITH MEALS
Status: ON HOLD | COMMUNITY
End: 2024-01-16 | Stop reason: HOSPADM

## 2023-11-29 RX ORDER — IBUPROFEN 200 MG
24 TABLET ORAL
Status: DISCONTINUED | OUTPATIENT
Start: 2023-11-29 | End: 2023-12-08 | Stop reason: HOSPADM

## 2023-11-29 RX ADMIN — PIPERACILLIN AND TAZOBACTAM 4.5 G: 4; .5 INJECTION, POWDER, LYOPHILIZED, FOR SOLUTION INTRAVENOUS; PARENTERAL at 04:11

## 2023-11-29 RX ADMIN — VANCOMYCIN HYDROCHLORIDE 1500 MG: 1.5 INJECTION, POWDER, LYOPHILIZED, FOR SOLUTION INTRAVENOUS at 05:11

## 2023-11-29 RX ADMIN — SODIUM CHLORIDE, POTASSIUM CHLORIDE, SODIUM LACTATE AND CALCIUM CHLORIDE 1000 ML: 600; 310; 30; 20 INJECTION, SOLUTION INTRAVENOUS at 01:11

## 2023-11-29 RX ADMIN — SODIUM CHLORIDE, POTASSIUM CHLORIDE, SODIUM LACTATE AND CALCIUM CHLORIDE 2178 ML: 600; 310; 30; 20 INJECTION, SOLUTION INTRAVENOUS at 03:11

## 2023-11-29 NOTE — FIRST PROVIDER EVALUATION
"Medical screening examination initiated.  I have conducted a focused provider triage encounter, findings are as follows:    Brief history of present illness:  Patient states right knee pain, swelling, and drainage. Hx. Of a septic knee joint.     Vitals:    11/29/23 1211   BP: (!) 96/56   Pulse: 83   Resp: 20   Temp: 97.9 °F (36.6 °C)   TempSrc: Oral   SpO2: 98%   Weight: 72.6 kg (160 lb)   Height: 5' 9" (1.753 m)       Pertinent physical exam:  awake, alert    Brief workup plan:  Labs, Imaging    Preliminary workup initiated; this workup will be continued and followed by the physician or advanced practice provider that is assigned to the patient when roomed.  "

## 2023-11-29 NOTE — TELEPHONE ENCOUNTER
Received call from Sherry with patients home health. She states patient is reporting to [facility] due to constant drainage from right knee. I have made providers aware.

## 2023-11-29 NOTE — ED PROVIDER NOTES
Encounter Date: 11/29/2023    SCRIBE #1 NOTE: I, Pastora Gale, am scribing for, and in the presence of,  Nicolas Baca MD. I have scribed the following portions of the note - Other sections scribed: HPI, ROS, PE.       History     Chief Complaint   Patient presents with    Knee Pain     Pt. Reports R knee pain and drainage, states he had a septic joint x 2 months ago.      Patient is a 58 year old male with a history of DM and HTN that presents to the ED for R knee drainage onset this morning. Patient's wife reports similar symptoms recently; he was diagnosed with sepsis and admitted to the ICU. Patient also complains of nausea, vomiting, rhinorrhea, and a headache yesterday. He denies abdominal pain, cough, congestion, chest pain, shortness of breath, and fever. Patient currently prescribed Doxycycline.     The history is provided by the patient, the spouse and medical records. No  was used.   Knee Pain  This is a recurrent problem. Episode onset: this morning. The problem has been gradually worsening. Associated symptoms include headaches. Pertinent negatives include no chest pain, no abdominal pain and no shortness of breath.     Review of patient's allergies indicates:   Allergen Reactions    Baclofen Itching and Anxiety     Past Medical History:   Diagnosis Date    Arthritis     Chronic ulcer of ankle 05/26/2022    Frequent UTI 07/02/2019    Generalized anxiety disorder 05/26/2022    Neurogenic bladder 05/26/2022    Osteomyelitis 05/26/2022    Presence of suprapubic catheter 05/26/2022    Pure hypercholesterolemia 05/26/2022    Retention of urine, unspecified 08/09/2019    Spinal cord injury at T1-T6 level 04/20/2018     Past Surgical History:   Procedure Laterality Date    FRACTURE SURGERY  2021    INCISION AND DRAINAGE, LOWER EXTREMITY Right 06/29/2023    Procedure: INCISION AND DRAINAGE, LOWER EXTREMITY;  Surgeon: Prabhu Shen DO;  Location: [facility] OR;  Service: Orthopedics;   Laterality: Right;  supine bone foam wash stuff cultures    INSERTION OF INTRAMEDULLARY MARISA Right 08/10/2022    Procedure: INSERTION, INTRAMEDULLARY MARISA RIGHT TIBIA;  Surgeon: Jorge Orellana MD;  Location: [facility];  Service: Orthopedics;  Laterality: Right;    JOINT REPLACEMENT      Ankel    SPINE SURGERY  2018     Family History   Problem Relation Age of Onset    No Known Problems Mother     No Known Problems Father      Social History     Tobacco Use    Smoking status: Some Days     Current packs/day: 0.00     Average packs/day: 0.2 packs/day for 41.5 years (10.4 ttl pk-yrs)     Types: Cigars, Cigarettes     Start date: 1982     Last attempt to quit: 2023     Years since quittin.3    Smokeless tobacco: Never   Substance Use Topics    Alcohol use: Not Currently     Alcohol/week: 2.0 standard drinks of alcohol     Types: 2 Cans of beer per week    Drug use: Not Currently     Types: Oxycodone     Review of Systems   Constitutional:  Negative for fatigue, fever and unexpected weight change.   HENT:  Positive for rhinorrhea. Negative for congestion.    Eyes:  Negative for pain.   Respiratory:  Negative for cough, chest tightness, shortness of breath and wheezing.    Cardiovascular:  Negative for chest pain.   Gastrointestinal:  Positive for nausea and vomiting. Negative for abdominal pain, constipation and diarrhea.   Genitourinary:  Negative for dysuria.   Musculoskeletal:  Negative for back pain and neck pain.        R knee drainage   Skin:  Negative for rash.   Allergic/Immunologic: Negative for environmental allergies, food allergies and immunocompromised state.   Neurological:  Positive for headaches. Negative for dizziness and speech difficulty.   Hematological:  Does not bruise/bleed easily.   Psychiatric/Behavioral:  Negative for sleep disturbance and suicidal ideas.        Physical Exam     Initial Vitals [23 1211]   BP Pulse Resp Temp SpO2   (!) 96/56 83 20 97.9 °F (36.6  °C) 98 %      MAP       --         Physical Exam    Nursing note and vitals reviewed.  Constitutional: He appears distressed.   Elderly. Thin. Frail. Debilitated.    HENT:   Head: Normocephalic and atraumatic.   Eyes: EOM are normal. Pupils are equal, round, and reactive to light.   Neck: Trachea normal. Neck supple.   Normal range of motion.  Cardiovascular:  Normal rate and regular rhythm.           No murmur heard.  Pulmonary/Chest: Breath sounds normal. No respiratory distress.   Abdominal: Abdomen is soft. Bowel sounds are normal. He exhibits no distension. There is no abdominal tenderness.   Genitourinary:    Genitourinary Comments: Suprapubic cathter in place with cloudish urine in bag.     Musculoskeletal:         General: Normal range of motion.      Cervical back: Normal range of motion and neck supple.      Lumbar back: Normal.     Neurological: He is alert and oriented to person, place, and time. He has normal strength. No cranial nerve deficit.   Skin: Skin is warm and dry. No rash noted.   R knee wound with purulent drainage.    Psychiatric: He has a normal mood and affect. Judgment normal.         ED Course   Critical Care    Date/Time: 11/29/2023 5:56 PM    Performed by: Nicolas Baca III, MD  Authorized by: Nicolas Baca III, MD  Direct patient critical care time: 28 minutes  Documentation critical care time: 5 minutes  Consulting other physicians critical care time: 5 minutes  Total critical care time (exclusive of procedural time) : 38 minutes  Critical care was necessary to treat or prevent imminent or life-threatening deterioration of the following conditions: metabolic crisis.  Critical care was time spent personally by me on the following activities: discussions with primary provider, discussions with consultants, examination of patient, re-evaluation of patient's condition, review of old charts, ordering and review of laboratory studies and ordering and performing treatments and  interventions.        Labs Reviewed   COMPREHENSIVE METABOLIC PANEL - Abnormal; Notable for the following components:       Result Value    Sodium Level 135 (*)     Chloride 109 (*)     Carbon Dioxide 16 (*)     Glucose Level 140 (*)     Creatinine 1.29 (*)     Albumin Level 2.7 (*)     Globulin 4.2 (*)     Albumin/Globulin Ratio 0.6 (*)     All other components within normal limits   CBC WITH DIFFERENTIAL - Abnormal; Notable for the following components:    WBC 17.28 (*)     RBC 4.44 (*)     Hgb 11.1 (*)     Hct 36.8 (*)     MCH 25.0 (*)     MCHC 30.2 (*)     RDW 19.5 (*)     Neut # 11.90 (*)     Mono # 1.59 (*)     IG# 0.10 (*)     All other components within normal limits   BLOOD GAS - Abnormal; Notable for the following components:    pO2, Blood gas 75.0 (*)     Sodium, Blood Gas 132 (*)     THb, Blood gas 10.1 (*)     O2 Hb, Blood Gas 91.3 (*)     CO Hgb 5.3 (*)     All other components within normal limits   C-REACTIVE PROTEIN - Abnormal; Notable for the following components:    C-Reactive Protein 211.10 (*)     All other components within normal limits   SEDIMENTATION RATE, AUTOMATED - Abnormal; Notable for the following components:    Sed Rate 65 (*)     All other components within normal limits   LACTIC ACID, PLASMA - Normal   BLOOD CULTURE [facility]   BLOOD CULTURE [facility]   WOUND CULTURE ([facility])   CBC W/ AUTO DIFFERENTIAL    Narrative:     The following orders were created for panel order CBC Auto Differential.  Procedure                               Abnormality         Status                     ---------                               -----------         ------                     CBC with Differential[[id number]]       Abnormal            Final result                 Please view results for these tests on the individual orders.   HEMOGLOBIN A1C   POCT GLUCOSE MONITORING CONTINUOUS          Imaging Results              X-Ray Knee Complete 4 Or More Views Right (Final result)  Result time 11/29/23 12:57:49       Final result by Lanette Waller MD (11/29/23 12:57:49)                   Impression:      1. No acute bony abnormality.  2. Soft tissue swelling.      Electronically signed by: Lanette Waller  Date:    11/29/2023  Time:    12:57               Narrative:    EXAMINATION:  XR KNEE COMP 4 OR MORE VIEWS RIGHT    CLINICAL HISTORY:  Pain in right knee    COMPARISON:  X-rays dated 09/21/2023    FINDINGS:  There is stable alignment with prior internal fixation of the distal femur and proximal tibia.  There is no acute fracture identified.  There are tricompartmental osteophytes with joint space narrowing in the medial compartment.  There is soft tissue swelling of the knee anteriorly.                                       Medications   vancomycin 1.5 g in dextrose 5 % 250 mL IVPB (ready to mix) (1,500 mg Intravenous New Bag 11/29/23 1752)   insulin aspart U-100 injection 1-10 Units (has no administration in time range)   glucose chewable tablet 16 g (has no administration in time range)   glucose chewable tablet 24 g (has no administration in time range)   glucagon (human recombinant) injection 1 mg (has no administration in time range)   dextrose 10% bolus 125 mL 125 mL (has no administration in time range)   dextrose 10% bolus 250 mL 250 mL (has no administration in time range)   lactated ringers bolus 1,000 mL (0 mLs Intravenous Stopped 11/29/23 1430)   lactated ringers bolus 2,178 mL (2,178 mLs Intravenous New Bag 11/29/23 1548)   piperacillin-tazobactam (ZOSYN) 4.5 g in dextrose 5 % in water (D5W) 100 mL IVPB (MB+) (0 g Intravenous Stopped 11/29/23 1633)     Medical Decision Making  Differential diagnosis includes, but is not limited to, sepsis, bacteremia, UTI, electrolyte disturbance, and bursitis.     Patient with a softBlood pressure patient was treated as a septic protocol was given 20 cc/kilogram of IV fluids vanc and Zosyn.  Patient with normalization of blood pressure patient with normal lactic acid  leukocytosis patient with a electrolyte disturbance concern for acidosis but ABG normal likely electrolyte shift discussed case with orthopedics states admit to Hospital Medicine keep NPO and admit    Problems Addressed:  Pyogenic arthritis of right knee joint, due to unspecified organism: complicated acute illness or injury with systemic symptoms that poses a threat to life or bodily functions    Amount and/or Complexity of Data Reviewed  Independent Historian: spouse     Details: Patient's wife corresponds with the patient's story and provided additional collateral information.   Labs: ordered.  Radiology: ordered and independent interpretation performed.    Risk  Prescription drug management.            Scribe Attestation:   Scribe #1: I performed the above scribed service and the documentation accurately describes the services I performed. I attest to the accuracy of the note.    Attending Attestation:           Physician Attestation for Scribe:  Physician Attestation Statement for Scribe #1: I, Nicolas Baca MD, reviewed documentation, as scribed by Pastora Gale in my presence, and it is both accurate and complete.             ED Course as of 11/29/23 1758 Wed Nov 29, 2023 1547 Discussed case with Orthopedics plan if stable to admit and washout knee in the morning patient with a borderline hypotensive state was hypotensive x1 current pressure 99 but will treat with septic protocol vanc and Zosyn patient with CO2 of 16 but no anion gap will get ABG appears chronically ill but not acutely toxic [FK]      ED Course User Index  [FK] Nicolas Baca III, MD                           Clinical Impression:  Final diagnoses:  [M25.561, M25.461] Pain and swelling of knee, right  [M00.9] Pyogenic arthritis of right knee joint, due to unspecified organism (Primary)          ED Disposition Condition    Admit Stable                Nicolas Baca III, MD  11/29/23 1758

## 2023-11-30 ENCOUNTER — ANESTHESIA (OUTPATIENT)
Dept: SURGERY | Facility: HOSPITAL | Age: 58
DRG: 485 | End: 2023-11-30
Payer: MEDICARE

## 2023-11-30 ENCOUNTER — ANESTHESIA EVENT (OUTPATIENT)
Dept: SURGERY | Facility: HOSPITAL | Age: 58
DRG: 485 | End: 2023-11-30
Payer: MEDICARE

## 2023-11-30 PROBLEM — M25.461 PAIN AND SWELLING OF KNEE, RIGHT: Status: ACTIVE | Noted: 2023-11-30

## 2023-11-30 PROBLEM — M00.9: Status: ACTIVE | Noted: 2023-11-30

## 2023-11-30 PROBLEM — M25.561 PAIN AND SWELLING OF KNEE, RIGHT: Status: ACTIVE | Noted: 2023-11-30

## 2023-11-30 LAB
ALBUMIN SERPL-MCNC: 2.3 G/DL (ref 3.5–5)
ALBUMIN/GLOB SERPL: 0.6 RATIO (ref 1.1–2)
ALP SERPL-CCNC: 63 UNIT/L (ref 40–150)
ALT SERPL-CCNC: 11 UNIT/L (ref 0–55)
AMPHET UR QL SCN: NEGATIVE
AST SERPL-CCNC: 16 UNIT/L (ref 5–34)
BARBITURATE SCN PRESENT UR: NEGATIVE
BASOPHILS # BLD AUTO: 0.01 X10(3)/MCL
BASOPHILS NFR BLD AUTO: 0.1 %
BENZODIAZ UR QL SCN: POSITIVE
BILIRUB SERPL-MCNC: 0.4 MG/DL
BUN SERPL-MCNC: 9.6 MG/DL (ref 8.4–25.7)
CALCIUM SERPL-MCNC: 8.4 MG/DL (ref 8.4–10.2)
CANNABINOIDS UR QL SCN: NEGATIVE
CHLORIDE SERPL-SCNC: 106 MMOL/L (ref 98–107)
CO2 SERPL-SCNC: 24 MMOL/L (ref 22–29)
COCAINE UR QL SCN: POSITIVE
CREAT SERPL-MCNC: 0.77 MG/DL (ref 0.73–1.18)
EOSINOPHIL # BLD AUTO: 0.09 X10(3)/MCL (ref 0–0.9)
EOSINOPHIL NFR BLD AUTO: 0.7 %
ERYTHROCYTE [DISTWIDTH] IN BLOOD BY AUTOMATED COUNT: 18 % (ref 11.5–17)
FENTANYL UR QL SCN: NEGATIVE
GFR SERPLBLD CREATININE-BSD FMLA CKD-EPI: >60 MLS/MIN/1.73/M2
GLOBULIN SER-MCNC: 3.6 GM/DL (ref 2.4–3.5)
GLUCOSE SERPL-MCNC: 90 MG/DL (ref 74–100)
GROUP & RH: NORMAL
HCT VFR BLD AUTO: 31.2 % (ref 42–52)
HGB BLD-MCNC: 9.9 G/DL (ref 14–18)
IMM GRANULOCYTES # BLD AUTO: 0.05 X10(3)/MCL (ref 0–0.04)
IMM GRANULOCYTES NFR BLD AUTO: 0.4 %
INDIRECT COOMBS GEL: NORMAL
LYMPHOCYTES # BLD AUTO: 2.72 X10(3)/MCL (ref 0.6–4.6)
LYMPHOCYTES NFR BLD AUTO: 20.7 %
MCH RBC QN AUTO: 25.1 PG (ref 27–31)
MCHC RBC AUTO-ENTMCNC: 31.7 G/DL (ref 33–36)
MCV RBC AUTO: 79 FL (ref 80–94)
MDMA UR QL SCN: NEGATIVE
MONOCYTES # BLD AUTO: 1.15 X10(3)/MCL (ref 0.1–1.3)
MONOCYTES NFR BLD AUTO: 8.8 %
NEUTROPHILS # BLD AUTO: 9.1 X10(3)/MCL (ref 2.1–9.2)
NEUTROPHILS NFR BLD AUTO: 69.3 %
NRBC BLD AUTO-RTO: 0 %
OPIATES UR QL SCN: POSITIVE
PCP UR QL: NEGATIVE
PH UR: 6 [PH] (ref 3–11)
PLATELET # BLD AUTO: 357 X10(3)/MCL (ref 130–400)
PMV BLD AUTO: 9.6 FL (ref 7.4–10.4)
POCT GLUCOSE: 133 MG/DL (ref 70–110)
POCT GLUCOSE: 81 MG/DL (ref 70–110)
POCT GLUCOSE: 90 MG/DL (ref 70–110)
POTASSIUM SERPL-SCNC: 4.2 MMOL/L (ref 3.5–5.1)
PROT SERPL-MCNC: 5.9 GM/DL (ref 6.4–8.3)
RBC # BLD AUTO: 3.95 X10(6)/MCL (ref 4.7–6.1)
SODIUM SERPL-SCNC: 137 MMOL/L (ref 136–145)
SPECIMEN OUTDATE: NORMAL
WBC # SPEC AUTO: 13.12 X10(3)/MCL (ref 4.5–11.5)

## 2023-11-30 PROCEDURE — 37000009 HC ANESTHESIA EA ADD 15 MINS: Performed by: ORTHOPAEDIC SURGERY

## 2023-11-30 PROCEDURE — 80053 COMPREHEN METABOLIC PANEL: CPT | Performed by: NURSE PRACTITIONER

## 2023-11-30 PROCEDURE — 25000003 PHARM REV CODE 250: Performed by: NURSE PRACTITIONER

## 2023-11-30 PROCEDURE — 93010 EKG 12-LEAD: ICD-10-PCS | Mod: ,,, | Performed by: INTERNAL MEDICINE

## 2023-11-30 PROCEDURE — D9220A PRA ANESTHESIA: ICD-10-PCS | Mod: ANES,,, | Performed by: ANESTHESIOLOGY

## 2023-11-30 PROCEDURE — D9220A PRA ANESTHESIA: Mod: ANES,,, | Performed by: ANESTHESIOLOGY

## 2023-11-30 PROCEDURE — 36000705 HC OR TIME LEV I EA ADD 15 MIN: Performed by: ORTHOPAEDIC SURGERY

## 2023-11-30 PROCEDURE — 37000008 HC ANESTHESIA 1ST 15 MINUTES: Performed by: ORTHOPAEDIC SURGERY

## 2023-11-30 PROCEDURE — 27310 EXPLORATION OF KNEE JOINT: CPT | Mod: AS,RT,, | Performed by: PHYSICIAN ASSISTANT

## 2023-11-30 PROCEDURE — 99223 1ST HOSP IP/OBS HIGH 75: CPT | Mod: 57,,, | Performed by: ORTHOPAEDIC SURGERY

## 2023-11-30 PROCEDURE — 11000001 HC ACUTE MED/SURG PRIVATE ROOM

## 2023-11-30 PROCEDURE — 36000704 HC OR TIME LEV I 1ST 15 MIN: Performed by: ORTHOPAEDIC SURGERY

## 2023-11-30 PROCEDURE — 25000003 PHARM REV CODE 250: Performed by: NURSE ANESTHETIST, CERTIFIED REGISTERED

## 2023-11-30 PROCEDURE — 87070 CULTURE OTHR SPECIMN AEROBIC: CPT | Performed by: ORTHOPAEDIC SURGERY

## 2023-11-30 PROCEDURE — 63600175 PHARM REV CODE 636 W HCPCS: Performed by: PHYSICIAN ASSISTANT

## 2023-11-30 PROCEDURE — 93010 ELECTROCARDIOGRAM REPORT: CPT | Mod: ,,, | Performed by: INTERNAL MEDICINE

## 2023-11-30 PROCEDURE — 63600175 PHARM REV CODE 636 W HCPCS: Performed by: NURSE PRACTITIONER

## 2023-11-30 PROCEDURE — 99223 PR INITIAL HOSPITAL CARE,LEVL III: ICD-10-PCS | Mod: 57,,, | Performed by: ORTHOPAEDIC SURGERY

## 2023-11-30 PROCEDURE — 80307 DRUG TEST PRSMV CHEM ANLYZR: CPT | Performed by: NURSE PRACTITIONER

## 2023-11-30 PROCEDURE — 25000003 PHARM REV CODE 250: Performed by: PHYSICIAN ASSISTANT

## 2023-11-30 PROCEDURE — 27310 PR EXPLOR/DRAIN KNEE,INFECTN: ICD-10-PCS | Mod: AS,RT,, | Performed by: PHYSICIAN ASSISTANT

## 2023-11-30 PROCEDURE — 63600175 PHARM REV CODE 636 W HCPCS: Performed by: INTERNAL MEDICINE

## 2023-11-30 PROCEDURE — 25000003 PHARM REV CODE 250: Performed by: INTERNAL MEDICINE

## 2023-11-30 PROCEDURE — 87205 SMEAR GRAM STAIN: CPT | Performed by: ORTHOPAEDIC SURGERY

## 2023-11-30 PROCEDURE — 27310 EXPLORATION OF KNEE JOINT: CPT | Mod: RT,,, | Performed by: ORTHOPAEDIC SURGERY

## 2023-11-30 PROCEDURE — 71000033 HC RECOVERY, INTIAL HOUR: Performed by: ORTHOPAEDIC SURGERY

## 2023-11-30 PROCEDURE — 85025 COMPLETE CBC W/AUTO DIFF WBC: CPT | Performed by: NURSE PRACTITIONER

## 2023-11-30 PROCEDURE — D9220A PRA ANESTHESIA: ICD-10-PCS | Mod: CRNA,,, | Performed by: NURSE ANESTHETIST, CERTIFIED REGISTERED

## 2023-11-30 PROCEDURE — 27201423 OPTIME MED/SURG SUP & DEVICES STERILE SUPPLY: Performed by: ORTHOPAEDIC SURGERY

## 2023-11-30 PROCEDURE — 86850 RBC ANTIBODY SCREEN: CPT | Performed by: ORTHOPAEDIC SURGERY

## 2023-11-30 PROCEDURE — 63600175 PHARM REV CODE 636 W HCPCS

## 2023-11-30 PROCEDURE — 93005 ELECTROCARDIOGRAM TRACING: CPT

## 2023-11-30 PROCEDURE — 87102 FUNGUS ISOLATION CULTURE: CPT | Performed by: ORTHOPAEDIC SURGERY

## 2023-11-30 PROCEDURE — 27310 PR EXPLOR/DRAIN KNEE,INFECTN: ICD-10-PCS | Mod: RT,,, | Performed by: ORTHOPAEDIC SURGERY

## 2023-11-30 PROCEDURE — D9220A PRA ANESTHESIA: Mod: CRNA,,, | Performed by: NURSE ANESTHETIST, CERTIFIED REGISTERED

## 2023-11-30 PROCEDURE — 63600175 PHARM REV CODE 636 W HCPCS: Performed by: ANESTHESIOLOGY

## 2023-11-30 PROCEDURE — 87075 CULTR BACTERIA EXCEPT BLOOD: CPT | Performed by: ORTHOPAEDIC SURGERY

## 2023-11-30 PROCEDURE — 63600175 PHARM REV CODE 636 W HCPCS: Performed by: NURSE ANESTHETIST, CERTIFIED REGISTERED

## 2023-11-30 RX ORDER — LIDOCAINE HYDROCHLORIDE 20 MG/ML
INJECTION INTRAVENOUS
Status: DISCONTINUED | OUTPATIENT
Start: 2023-11-30 | End: 2023-11-30

## 2023-11-30 RX ORDER — AMPICILLIN 500 MG/1
2000 CAPSULE ORAL EVERY 6 HOURS
Status: DISCONTINUED | OUTPATIENT
Start: 2023-12-01 | End: 2023-12-01

## 2023-11-30 RX ORDER — MORPHINE SULFATE 4 MG/ML
3 INJECTION, SOLUTION INTRAMUSCULAR; INTRAVENOUS EVERY 4 HOURS PRN
Status: DISCONTINUED | OUTPATIENT
Start: 2023-11-30 | End: 2023-12-02

## 2023-11-30 RX ORDER — HYDROCODONE BITARTRATE AND ACETAMINOPHEN 10; 325 MG/1; MG/1
1 TABLET ORAL EVERY 4 HOURS PRN
Status: DISCONTINUED | OUTPATIENT
Start: 2023-11-30 | End: 2023-12-01

## 2023-11-30 RX ORDER — POLYETHYLENE GLYCOL 3350 17 G/17G
17 POWDER, FOR SOLUTION ORAL DAILY
Status: DISPENSED | OUTPATIENT
Start: 2023-11-30 | End: 2023-12-07

## 2023-11-30 RX ORDER — CARVEDILOL 12.5 MG/1
12.5 TABLET ORAL 2 TIMES DAILY WITH MEALS
Status: DISCONTINUED | OUTPATIENT
Start: 2023-11-30 | End: 2023-12-05

## 2023-11-30 RX ORDER — ONDANSETRON 2 MG/ML
4 INJECTION INTRAMUSCULAR; INTRAVENOUS EVERY 6 HOURS PRN
Status: DISCONTINUED | OUTPATIENT
Start: 2023-11-30 | End: 2023-12-08 | Stop reason: HOSPADM

## 2023-11-30 RX ORDER — LIDOCAINE HYDROCHLORIDE 10 MG/ML
1 INJECTION, SOLUTION EPIDURAL; INFILTRATION; INTRACAUDAL; PERINEURAL ONCE
Status: CANCELLED | OUTPATIENT
Start: 2023-11-30 | End: 2023-11-30

## 2023-11-30 RX ORDER — METHOCARBAMOL 750 MG/1
750 TABLET, FILM COATED ORAL 3 TIMES DAILY
Status: DISCONTINUED | OUTPATIENT
Start: 2023-11-30 | End: 2023-12-03

## 2023-11-30 RX ORDER — HYDROCODONE BITARTRATE AND ACETAMINOPHEN 5; 325 MG/1; MG/1
1 TABLET ORAL EVERY 4 HOURS PRN
Status: DISCONTINUED | OUTPATIENT
Start: 2023-11-30 | End: 2023-12-01

## 2023-11-30 RX ORDER — HYDROMORPHONE HYDROCHLORIDE 2 MG/ML
0.2 INJECTION, SOLUTION INTRAMUSCULAR; INTRAVENOUS; SUBCUTANEOUS EVERY 5 MIN PRN
Status: DISCONTINUED | OUTPATIENT
Start: 2023-11-30 | End: 2023-11-30 | Stop reason: HOSPADM

## 2023-11-30 RX ORDER — MORPHINE SULFATE 4 MG/ML
INJECTION, SOLUTION INTRAMUSCULAR; INTRAVENOUS
Status: COMPLETED
Start: 2023-11-30 | End: 2023-11-30

## 2023-11-30 RX ORDER — MEPERIDINE HYDROCHLORIDE 25 MG/ML
12.5 INJECTION INTRAMUSCULAR; INTRAVENOUS; SUBCUTANEOUS ONCE AS NEEDED
Status: DISCONTINUED | OUTPATIENT
Start: 2023-11-30 | End: 2023-11-30 | Stop reason: HOSPADM

## 2023-11-30 RX ORDER — ZOLPIDEM TARTRATE 5 MG/1
5 TABLET ORAL NIGHTLY PRN
Status: DISCONTINUED | OUTPATIENT
Start: 2023-11-30 | End: 2023-12-08 | Stop reason: HOSPADM

## 2023-11-30 RX ORDER — FLUOXETINE HYDROCHLORIDE 20 MG/1
20 CAPSULE ORAL DAILY
Status: DISCONTINUED | OUTPATIENT
Start: 2023-11-30 | End: 2023-12-08 | Stop reason: HOSPADM

## 2023-11-30 RX ORDER — ACETAMINOPHEN 10 MG/ML
1000 INJECTION, SOLUTION INTRAVENOUS ONCE
Status: COMPLETED | OUTPATIENT
Start: 2023-11-30 | End: 2023-11-30

## 2023-11-30 RX ORDER — OXYBUTYNIN CHLORIDE 5 MG/1
5 TABLET ORAL 2 TIMES DAILY
Status: DISCONTINUED | OUTPATIENT
Start: 2023-11-30 | End: 2023-12-08 | Stop reason: HOSPADM

## 2023-11-30 RX ORDER — ATORVASTATIN CALCIUM 10 MG/1
20 TABLET, FILM COATED ORAL NIGHTLY
Status: DISCONTINUED | OUTPATIENT
Start: 2023-11-30 | End: 2023-12-08 | Stop reason: HOSPADM

## 2023-11-30 RX ORDER — MORPHINE SULFATE 4 MG/ML
2 INJECTION, SOLUTION INTRAMUSCULAR; INTRAVENOUS EVERY 4 HOURS PRN
Status: DISCONTINUED | OUTPATIENT
Start: 2023-11-30 | End: 2023-11-30

## 2023-11-30 RX ORDER — ENOXAPARIN SODIUM 100 MG/ML
40 INJECTION SUBCUTANEOUS EVERY 24 HOURS
Status: DISCONTINUED | OUTPATIENT
Start: 2023-11-30 | End: 2023-12-08 | Stop reason: HOSPADM

## 2023-11-30 RX ORDER — SODIUM CHLORIDE, SODIUM GLUCONATE, SODIUM ACETATE, POTASSIUM CHLORIDE AND MAGNESIUM CHLORIDE 30; 37; 368; 526; 502 MG/100ML; MG/100ML; MG/100ML; MG/100ML; MG/100ML
INJECTION, SOLUTION INTRAVENOUS CONTINUOUS
Status: CANCELLED | OUTPATIENT
Start: 2023-11-30 | End: 2023-12-30

## 2023-11-30 RX ORDER — SODIUM CITRATE AND CITRIC ACID MONOHYDRATE 334; 500 MG/5ML; MG/5ML
30 SOLUTION ORAL
Status: CANCELLED | OUTPATIENT
Start: 2023-11-30

## 2023-11-30 RX ORDER — PROPOFOL 10 MG/ML
VIAL (ML) INTRAVENOUS
Status: DISCONTINUED | OUTPATIENT
Start: 2023-11-30 | End: 2023-11-30

## 2023-11-30 RX ORDER — CEFAZOLIN SODIUM 2 G/50ML
2 SOLUTION INTRAVENOUS
Status: COMPLETED | OUTPATIENT
Start: 2023-11-30 | End: 2023-12-01

## 2023-11-30 RX ORDER — VANCOMYCIN HCL IN 5 % DEXTROSE 1G/250ML
1000 PLASTIC BAG, INJECTION (ML) INTRAVENOUS
Status: DISCONTINUED | OUTPATIENT
Start: 2023-11-30 | End: 2023-11-30

## 2023-11-30 RX ADMIN — VANCOMYCIN HYDROCHLORIDE 500 MG: 500 INJECTION, POWDER, LYOPHILIZED, FOR SOLUTION INTRAVENOUS at 02:11

## 2023-11-30 RX ADMIN — LIDOCAINE HYDROCHLORIDE 40 MG: 20 INJECTION INTRAVENOUS at 02:11

## 2023-11-30 RX ADMIN — MORPHINE SULFATE 2 MG: 4 INJECTION, SOLUTION INTRAMUSCULAR; INTRAVENOUS at 12:11

## 2023-11-30 RX ADMIN — ZOLPIDEM TARTRATE 5 MG: 5 TABLET ORAL at 01:11

## 2023-11-30 RX ADMIN — ATORVASTATIN CALCIUM 20 MG: 10 TABLET, FILM COATED ORAL at 01:11

## 2023-11-30 RX ADMIN — CEFAZOLIN SODIUM 2 G: 2 SOLUTION INTRAVENOUS at 05:11

## 2023-11-30 RX ADMIN — ATORVASTATIN CALCIUM 20 MG: 10 TABLET, FILM COATED ORAL at 08:11

## 2023-11-30 RX ADMIN — PIPERACILLIN SODIUM AND TAZOBACTAM SODIUM 4.5 G: 4; .5 INJECTION, POWDER, FOR SOLUTION INTRAVENOUS at 02:11

## 2023-11-30 RX ADMIN — CARVEDILOL 12.5 MG: 12.5 TABLET, FILM COATED ORAL at 07:11

## 2023-11-30 RX ADMIN — HYDROCODONE BITARTRATE AND ACETAMINOPHEN 1 TABLET: 5; 325 TABLET ORAL at 06:11

## 2023-11-30 RX ADMIN — OXYBUTYNIN CHLORIDE 5 MG: 5 TABLET ORAL at 08:11

## 2023-11-30 RX ADMIN — SODIUM CHLORIDE, SODIUM GLUCONATE, SODIUM ACETATE, POTASSIUM CHLORIDE AND MAGNESIUM CHLORIDE: 526; 502; 368; 37; 30 INJECTION, SOLUTION INTRAVENOUS at 01:11

## 2023-11-30 RX ADMIN — PIPERACILLIN SODIUM AND TAZOBACTAM SODIUM 4.5 G: 4; .5 INJECTION, POWDER, FOR SOLUTION INTRAVENOUS at 06:11

## 2023-11-30 RX ADMIN — PROPOFOL 200 MG: 10 INJECTION, EMULSION INTRAVENOUS at 02:11

## 2023-11-30 RX ADMIN — CARVEDILOL 12.5 MG: 12.5 TABLET, FILM COATED ORAL at 05:11

## 2023-11-30 RX ADMIN — FLUOXETINE 20 MG: 20 CAPSULE ORAL at 08:11

## 2023-11-30 RX ADMIN — ACETAMINOPHEN 1000 MG: 10 INJECTION, SOLUTION INTRAVENOUS at 03:11

## 2023-11-30 RX ADMIN — ENOXAPARIN SODIUM 40 MG: 40 INJECTION SUBCUTANEOUS at 05:11

## 2023-11-30 RX ADMIN — VANCOMYCIN HYDROCHLORIDE 1000 MG: 1 INJECTION, POWDER, LYOPHILIZED, FOR SOLUTION INTRAVENOUS at 02:11

## 2023-11-30 RX ADMIN — PIPERACILLIN SODIUM AND TAZOBACTAM SODIUM 4.5 G: 4; .5 INJECTION, POWDER, FOR SOLUTION INTRAVENOUS at 11:11

## 2023-11-30 RX ADMIN — MORPHINE SULFATE 3 MG: 4 INJECTION, SOLUTION INTRAMUSCULAR; INTRAVENOUS at 02:11

## 2023-11-30 RX ADMIN — METHOCARBAMOL 750 MG: 750 TABLET ORAL at 08:11

## 2023-11-30 RX ADMIN — MORPHINE SULFATE 3 MG: 4 INJECTION, SOLUTION INTRAMUSCULAR; INTRAVENOUS at 12:11

## 2023-11-30 RX ADMIN — OXYBUTYNIN CHLORIDE 5 MG: 5 TABLET ORAL at 09:11

## 2023-11-30 RX ADMIN — HYDROCODONE BITARTRATE AND ACETAMINOPHEN 1 TABLET: 10; 325 TABLET ORAL at 08:11

## 2023-11-30 NOTE — CONSULTS
[facility] - Emergency Dept  Orthopedic Trauma  Consult Note    Patient Name: Bianca Khan  MRN: [id number]  Admission Date: 11/29/2023  Hospital Length of Stay: 1 days  Attending Provider: Eli Reddy DO  Primary Care Provider: Areli Vargas PA-C        Consults  Subjective:         Chief Complaint:   Chief Complaint   Patient presents with    Knee Pain     Pt. Reports R knee pain and drainage, states he had a septic joint x 2 months ago.         HPI:  Patient is a 58-year-old male with lower extremity paralysis.  He has chronic infection to the right knee.  I have offered him amputation in the past he has refused at this time.  He understands that this may lead to sepsis even death.  Patient has obvious purulent drainage of the right knee.  No new numbness or tingling no fevers or chills.    Past Medical History:   Diagnosis Date    Arthritis     Chronic ulcer of ankle 05/26/2022    Frequent UTI 07/02/2019    Generalized anxiety disorder 05/26/2022    Neurogenic bladder 05/26/2022    Osteomyelitis 05/26/2022    Presence of suprapubic catheter 05/26/2022    Pure hypercholesterolemia 05/26/2022    Retention of urine, unspecified 08/09/2019    Spinal cord injury at T1-T6 level 04/20/2018       Past Surgical History:   Procedure Laterality Date    FRACTURE SURGERY  2021    INCISION AND DRAINAGE, LOWER EXTREMITY Right 06/29/2023    Procedure: INCISION AND DRAINAGE, LOWER EXTREMITY;  Surgeon: Prabhu Shen DO;  Location: [facility];  Service: Orthopedics;  Laterality: Right;  supine bone foam wash stuff cultures    INSERTION OF INTRAMEDULLARY MARISA Right 08/10/2022    Procedure: INSERTION, INTRAMEDULLARY MARISA RIGHT TIBIA;  Surgeon: Jorge Orellana MD;  Location: [facility];  Service: Orthopedics;  Laterality: Right;    JOINT REPLACEMENT  2021    Ankel    SPINE SURGERY  March 1st 2018       Review of patient's allergies indicates:   Allergen Reactions    Baclofen Itching and Anxiety       Current  Facility-Administered Medications   Medication    atorvastatin tablet 20 mg    carvediloL tablet 12.5 mg    dextrose 10% bolus 125 mL 125 mL    dextrose 10% bolus 250 mL 250 mL    FLUoxetine capsule 20 mg    glucagon (human recombinant) injection 1 mg    glucose chewable tablet 16 g    glucose chewable tablet 24 g    insulin aspart U-100 injection 1-10 Units    morphine injection 3 mg    NON FORMULARY MEDICATION 30 mg    oxybutynin tablet 5 mg    piperacillin-tazobactam (ZOSYN) 4.5 g in dextrose 5 % in water (D5W) 100 mL IVPB (MB+)    vancomycin - pharmacy to dose    vancomycin 2 g in dextrose 5 % 500 mL IVPB    zolpidem tablet 5 mg     Current Outpatient Medications   Medication Sig    cloNIDine (CATAPRES) 0.1 MG tablet Take 1 tablet (0.1 mg total) by mouth 3 (three) times daily.    doxycycline (VIBRA-TABS) 100 MG tablet Take 1 tablet (100 mg total) by mouth every 12 (twelve) hours.    famotidine (PEPCID) 20 MG tablet Take 1 tablet (20 mg total) by mouth 2 (two) times daily.    hydrALAZINE (APRESOLINE) 100 MG tablet 1 tablet (100 mg total) by Per G Tube route every 8 (eight) hours. (Patient taking differently: Take 100 mg by mouth every 8 (eight) hours.)    hydrOXYzine pamoate (VISTARIL) 50 MG Cap Take 1 capsule (50 mg total) by mouth every evening.    lisinopriL (PRINIVIL,ZESTRIL) 20 MG tablet Take 1 tablet (20 mg total) by mouth once daily.    oxybutynin (DITROPAN) 5 MG Tab 1 tablet (5 mg total) by Per NG tube route 2 (two) times daily. (Patient taking differently: Take 5 mg by mouth 2 (two) times daily.)    oxyCODONE-acetaminophen (PERCOCET)  mg per tablet Take 1 tablet by mouth every 6 (six) hours as needed for Pain.    TEMAZEPAM ORAL Take 30 mg by mouth nightly as needed.    traZODone (DESYREL) 100 MG tablet 1 tablet (100 mg total) by Per G Tube route every evening. (Patient taking differently: Take 200 mg by mouth nightly as needed.)    ALPRAZolam (XANAX) 0.25 MG tablet Take 1 tablet (0.25 mg total) by  mouth 2 (two) times daily as needed for Anxiety (PRN Anxiety).    amLODIPine (NORVASC) 10 MG tablet Take 1 tablet (10 mg total) by mouth once daily.    atorvastatin (LIPITOR) 20 MG tablet Take 1 tablet (20 mg total) by mouth nightly.    carvediloL (COREG) 12.5 MG tablet Take 12.5 mg by mouth 2 (two) times daily with meals.    docusate sodium (COLACE) 100 MG capsule Take 1 capsule (100 mg total) by mouth 2 (two) times daily.    FENOFIBRATE NANOCRYSTALLIZED ORAL Take 48 mg by mouth once daily.    FLUoxetine 20 MG capsule 1 capsule (20 mg total) by Per G Tube route once daily. (Patient taking differently: Take 20 mg by mouth once daily.)    gentamicin (GARAMYCIN) 0.1 % ointment Apply topically 3 (three) times daily.    rivaroxaban (XARELTO) 20 mg Tab Take 20 mg by mouth daily with dinner or evening meal.     Family History       Problem Relation (Age of Onset)    No Known Problems Mother, Father          Tobacco Use    Smoking status: Some Days     Current packs/day: 0.00     Average packs/day: 0.2 packs/day for 41.5 years (10.4 ttl pk-yrs)     Types: Cigars, Cigarettes     Start date: 1982     Last attempt to quit: 2023     Years since quittin.3    Smokeless tobacco: Never   Substance and Sexual Activity    Alcohol use: Not Currently     Alcohol/week: 2.0 standard drinks of alcohol     Types: 2 Cans of beer per week    Drug use: Not Currently     Types: Oxycodone    Sexual activity: Not Currently     Partners: Female     Birth control/protection: None       ROS:  Constitutional: Denies fever chills  Eyes: No change in vision  ENT: No ringing or current infections  CV: No chest pain  Resp: No labored breathing  MSK: Pain evident at site of injury located in HPI,   Integ: No signs of abrasions or lacerations  Neuro: No numbness or tingling  Lymphatic: No swelling outside the area of injury   Objective:     Vital Signs (Most Recent):  Temp: 97.9 °F (36.6 °C) (23 1211)  Pulse: 85 (23 0715)  Resp:  "14 (11/30/23 0711)  BP: (!) 164/91 (11/30/23 0711)  SpO2: 98 % (11/30/23 0711) Vital Signs (24h Range):  Temp:  [97.9 °F (36.6 °C)] 97.9 °F (36.6 °C)  Pulse:  [61-85] 85  Resp:  [12-20] 14  SpO2:  [97 %-100 %] 98 %  BP: ()/(50-91) 164/91     Weight: 72.6 kg (160 lb)  Height: 5' 9" (175.3 cm)  Body mass index is 23.63 kg/m².      Intake/Output Summary (Last 24 hours) at 11/30/2023 0721  Last data filed at 11/29/2023 1934  Gross per 24 hour   Intake 250 ml   Output --   Net 250 ml       Ortho/SPM Exam  General the patient is alert and oriented x3 no acute distress nontoxic-appearing appropriate affect.    Constitutional: Vital signs are examined and stable.  Resp: No signs of labored breathing               LLE: -Skin: Dressing CDI, No signs of new abrasions or lacerations, no scars           -MSK: No motor           -Neuro: No sensation           -Lymphatic: No signs of lymphadenopathy           -CV: Capillary refill is less than 2 seconds. DP/PT pulses 2/4. Compartments soft and compressible                      RLE: -Skin:  Obvious purulence on the sheets around the right knee with a wet dressing, large swelling to the right knee No signs of new abrasions or lacerations, no scars           -MSK: :  No motor, baseline           -Neuro:  Minimal sensation, baseline           -Lymphatic: No signs of lymphadenopathy           -CV: Compartments soft and compressible.     Significant Labs:   Recent Lab Results  (Last 5 results in the past 24 hours)        11/30/23  0424   11/30/23  0242   11/29/23  2035   11/29/23  1644   11/29/23  1600        Phencyclidine   Negative             Albumin/Globulin Ratio 0.6               Albumin 2.3               ALP 63               Allens Test         N/A       ALT 11               Amphetamine Screen, Ur   Negative             AST 16               Barbiturate Screen, Ur   Negative             Baso # 0.01               Basophil % 0.1               Benzodiazepine Screen, Urine   " Positive             BILIRUBIN TOTAL 0.4               BUN 9.6               Calcium 8.4               Calcium Level Ionized         1.21       Cannabinoids, Urine   Negative             Chloride 106               CO2 24               Cocaine (Metab.)   Positive             Creatinine 0.77               Drawn by         AS LRT       eGFR >60               Eos # 0.09               Eosinophil % 0.7               Estimated Avg Glucose       125.5         Fentanyl, Urine   Negative             Globulin, Total 3.6               Glucose 90               Hematocrit 31.2               Hemoglobin 9.9               Hemoglobin A1C External       6.0         Immature Grans (Abs) 0.05               Immature Granulocytes 0.4               Lymph # 2.72               LYMPH % 20.7               MCH 25.1               MCHC 31.7               MCV 79.0               MDMA, Urine   Negative             Mono # 1.15               Mono % 8.8               MPV 9.6               Neut # 9.10               Neut % 69.3               nRBC 0.0               O2 Hb, Blood Gas         91.3       Opiate Scrn, Ur   Positive             pH, Urine   6.0             Platelet Count 357               Base Excess, Blood gas         -1.20       CO Hgb         5.3       POC HCO3         23.6       Met Hgb         0.5       POC PCO2         39.0       POC PH         7.390       POC PO2         75.0       Potassium, Blood Gas         4.2       Sodium, Blood Gas         132       POCT Glucose     169           Potassium 4.2               PROTEIN TOTAL 5.9               RBC 3.95               RDW 18.0               Sample site         Right Brachial Artery       Sample Type         Arterial Blood       Sed Rate       65         sO2, Blood gas         94.7       Sodium 137               TOC2, Blood gas         24.8       THb, Blood gas         10.1       WBC 13.12                                    All pertinent labs within the past 24 hours have been  reviewed.  Recent Lab Results  (Last 5 results in the past 72 hours)        11/30/23  0424   11/30/23  0242   11/29/23  2035   11/29/23  1644   11/29/23  1600        Phencyclidine   Negative             Albumin/Globulin Ratio 0.6               Albumin 2.3               ALP 63               Allens Test         N/A       ALT 11               Amphetamine Screen, Ur   Negative             AST 16               Barbiturate Screen, Ur   Negative             Baso # 0.01               Basophil % 0.1               Benzodiazepine Screen, Urine   Positive             BILIRUBIN TOTAL 0.4               BUN 9.6               Calcium 8.4               Calcium Level Ionized         1.21       Cannabinoids, Urine   Negative             Chloride 106               CO2 24               Cocaine (Metab.)   Positive             Creatinine 0.77               Drawn by         AS LRT       eGFR >60               Eos # 0.09               Eosinophil % 0.7               Estimated Avg Glucose       125.5         Fentanyl, Urine   Negative             Globulin, Total 3.6               Glucose 90               Hematocrit 31.2               Hemoglobin 9.9               Hemoglobin A1C External       6.0         Immature Grans (Abs) 0.05               Immature Granulocytes 0.4               Lymph # 2.72               LYMPH % 20.7               MCH 25.1               MCHC 31.7               MCV 79.0               MDMA, Urine   Negative             Mono # 1.15               Mono % 8.8               MPV 9.6               Neut # 9.10               Neut % 69.3               nRBC 0.0               O2 Hb, Blood Gas         91.3       Opiate Scrn, Ur   Positive             pH, Urine   6.0             Platelet Count 357               Base Excess, Blood gas         -1.20       CO Hgb         5.3       POC HCO3         23.6       Met Hgb         0.5       POC PCO2         39.0       POC PH         7.390       POC PO2         75.0       Potassium, Blood Gas          4.2       Sodium, Blood Gas         132       POCT Glucose     169           Potassium 4.2               PROTEIN TOTAL 5.9               RBC 3.95               RDW 18.0               Sample site         Right Brachial Artery       Sample Type         Arterial Blood       Sed Rate       65         sO2, Blood gas         94.7       Sodium 137               TOC2, Blood gas         24.8       THb, Blood gas         10.1       WBC 13.12                                       Significant Imaging: I have reviewed all pertinent imaging results/findings.  X-Ray Knee Complete 4 Or More Views Right    Result Date: 11/29/2023  EXAMINATION: XR KNEE COMP 4 OR MORE VIEWS RIGHT CLINICAL HISTORY: Pain in right knee COMPARISON: X-rays dated 09/21/2023 FINDINGS: There is stable alignment with prior internal fixation of the distal femur and proximal tibia.  There is no acute fracture identified.  There are tricompartmental osteophytes with joint space narrowing in the medial compartment.  There is soft tissue swelling of the knee anteriorly.     1. No acute bony abnormality. 2. Soft tissue swelling. Electronically signed by: Lanette Waller Date:    11/29/2023 Time:    12:57       Assessment/Plan:     Active Diagnoses:    Diagnosis Date Noted POA    PRINCIPAL PROBLEM:  Chronic infection of right knee [M00.9] 11/30/2023 Yes      Problems Resolved During this Admission:       Patient has a lower extremity paralysis.  He is had chronic right knee infection.  I have offered him above-the-knee amputations and multiple occasions.  He is currently draining purulent fluid into the bed.  He is awake alert sound mind.  He would like to proceed with incision and drainage at this time he is reluctant to sign up for amputation.  He understands that this may lead to sepsis and even death.  I am concerned that even with incision and drainage of the infection will not be cleared but he is adamantly against amputation.  We will proceed with incision  and drainage of his right knee possible arthrotomy.      I explained that surgery and the nature of their condition are not without risks. These include, but are not limited to, bleeding, infection, neurovascular compromise, malunion, nonunion, hardware complications, wound complications, scarring, cosmetic defects, need for later and/or repeated surgeries, avascular necrosis, bone death due to initial trauma, pain, loss of ROM, loss of function, PTOA, deformity, stance/gait and/or functional abnormalities, thromboembolic complications, compartment syndrome, loss of limb, loss of life, anesthetic complications, and other imponderables. I explained that these can occur despite the adequacy of treatments rendered, and that their risks are heightened given the nature of their condition. They verbalized understanding. They would like to continue with surgery at this time. If appropriate family was involved with surgical discussion.               This note/OR report was created with the assistance of  voice recognition software or phone  dictation.  There may be transcription errors as a result of using this technology however minimal. Effort has been made to assure accuracy of transcription but any obvious errors or omissions should be clarified with the author of the document.       Prabhu Shen, DO   Orthopedic Trauma Surgery  [facility] - Emergency Dept

## 2023-11-30 NOTE — PROGRESS NOTES
"Pharmacokinetic Initial Assessment: IV Vancomycin    Assessment/Plan:    Initiate intravenous vancomycin with loading dose of 2000 mg once followed by a maintenance dose of vancomycin 2000 mg IV every 24 hours  Desired empiric serum trough concentration is 15 to 20 mcg/mL  Draw vancomycin trough level 60 min prior to third dose on 12/01 at approximately 1700  Pharmacy will continue to follow and monitor vancomycin.      Please contact pharmacy at extension [phone] with any questions regarding this assessment.     Thank you for the consult,   Karri Caro       Patient brief summary:  Bianca Khan is a 58 y.o. male initiated on antimicrobial therapy with IV Vancomycin for treatment of suspected bone/joint infection    Drug Allergies:   Review of patient's allergies indicates:   Allergen Reactions    Baclofen Itching and Anxiety       Actual Body Weight:   73kg    Renal Function:   Estimated Creatinine Clearance: 61.7 mL/min (A) (based on SCr of 1.29 mg/dL (H)).,     Dialysis Method (if applicable):  N/A    CBC (last 72 hours):  Recent Labs   Lab Result Units 11/29/23  1257 11/29/23  1644   WBC x10(3)/mcL 17.28*  --    Hgb g/dL 11.1*  --    Hemoglobin A1c %  --  6.0   Hct % 36.8*  --    Platelet x10(3)/mcL 398  --    Mono % % 9.2  --    Eos % % 0.0  --    Basophil % % 0.2  --        Metabolic Panel (last 72 hours):  Recent Labs   Lab Result Units 11/29/23  1257   Sodium Level mmol/L 135*   Potassium Level mmol/L 4.7   Chloride mmol/L 109*   Carbon Dioxide mmol/L 16*   Glucose Level mg/dL 140*   Blood Urea Nitrogen mg/dL 12.6   Creatinine mg/dL 1.29*   Albumin Level g/dL 2.7*   Bilirubin Total mg/dL 0.4   Alkaline Phosphatase unit/L 78   Aspartate Aminotransferase unit/L 22   Alanine Aminotransferase unit/L 13       Drug levels (last 3 results):  No results for input(s): "VANCOMYCINRA", "VANCORANDOM", "VANCOMYCINPE", "VANCOPEAK", "VANCOMYCINTR", "VANCOTROUGH" in the last 72 hours.    Microbiologic Results:  Microbiology " Results (last 7 days)       Procedure Component Value Units Date/Time    Wound Culture [[id number]] Collected: 11/29/23 1536    Order Status: Sent Specimen: Abscess from Knee, Right Updated: 11/29/23 1539    Blood Culture [[id number]] Collected: 11/29/23 1257    Order Status: Resulted Specimen: Blood Updated: 11/29/23 1339    Blood Culture [[id number]] Collected: 11/29/23 1257    Order Status: Resulted Specimen: Blood Updated: 11/29/23 1339

## 2023-11-30 NOTE — NURSING
[facility] - Periop Services  Wound Care    Patient Name:  Bianca Khan   MRN:  [id number]  Date: 2023  Diagnosis: Chronic infection of right knee    History:     Past Medical History:   Diagnosis Date    Arthritis     Chronic ulcer of ankle 2022    Frequent UTI 2019    Generalized anxiety disorder 2022    Neurogenic bladder 2022    Osteomyelitis 2022    Presence of suprapubic catheter 2022    Pure hypercholesterolemia 2022    Retention of urine, unspecified 2019    Spinal cord injury at T1-T6 level 2018       Social History     Socioeconomic History    Marital status:    Tobacco Use    Smoking status: Some Days     Current packs/day: 0.00     Average packs/day: 0.2 packs/day for 41.5 years (10.4 ttl pk-yrs)     Types: Cigars, Cigarettes     Start date: 1982     Last attempt to quit: 2023     Years since quittin.3    Smokeless tobacco: Never   Substance and Sexual Activity    Alcohol use: Not Currently     Alcohol/week: 2.0 standard drinks of alcohol     Types: 2 Cans of beer per week    Drug use: Not Currently     Types: Oxycodone    Sexual activity: Not Currently     Partners: Female     Birth control/protection: None     Social Determinants of Health     Financial Resource Strain: Low Risk  (2023)    Overall Financial Resource Strain (CARDIA)     Difficulty of Paying Living Expenses: Not very hard   Food Insecurity: No Food Insecurity (2023)    Hunger Vital Sign     Worried About Running Out of Food in the Last Year: Never true     Ran Out of Food in the Last Year: Never true   Transportation Needs: No Transportation Needs (2023)    PRAPARE - Transportation     Lack of Transportation (Medical): No     Lack of Transportation (Non-Medical): No   Physical Activity: Inactive (2023)    Exercise Vital Sign     Days of Exercise per Week: 0 days     Minutes of Exercise per Session: 0 min   Stress: No Stress  Concern Present (8/9/2023)    [employer] [place] [employer] - Occupational Stress Questionnaire     Feeling of Stress : Only a little   Social Connections: Moderately Isolated (10/3/2023)    Social Connection and Isolation Panel [NHANES]     Frequency of Communication with Friends and Family: Twice a week     Frequency of Social Gatherings with Friends and Family: Twice a week     Attends [employer] Services: Never     Active Member of Clubs or Organizations: No     Attends Club or Organization Meetings: Never     Marital Status:    Housing Stability: Low Risk  (8/9/2023)    Housing Stability Vital Sign     Unable to Pay for Housing in the Last Year: No     Number of Places Lived in the Last Year: 1     Unstable Housing in the Last Year: No       Precautions:     Allergies as of 11/29/2023 - Reviewed 11/29/2023   Allergen Reaction Noted    Baclofen Itching and Anxiety 06/17/2019       WO Assessment Details/Treatment   Consult received for right ankle and left heel wounds. Patient with long history of chronic pressure injuries. Right gluteal stage 3 POA, right lateral ankle stage 4 POA. Dressed with silver alginate. Needs pressure relief measures.        11/30/23 1240   WOCN Assessment   WOCN Total Time (mins) 60   Visit Date 11/30/23   Visit Time 1240   Consult Type New   WOCN Speciality Wound   Wound pressure   Number of Wounds 3   Intervention assessed;changed;applied;chart review;orders   Skin Interventions   Pressure Reduction Techniques heels elevated off bed   Positioning   Body Position 30 degrees   Pressure Injury Prevention    Sacral Foam Dressing Apply        Altered Skin Integrity 06/28/23 2230 Right lateral Ankle #6   Date First Assessed/Time First Assessed: 06/28/23 2230   Altered Skin Integrity Present on Admission - Did Patient arrive to the hospital with altered skin?: yes  Side: Right  Orientation: lateral  Location: Ankle  Wound Number: #6  Is this injury dev...   Wound Image     Description of Altered Skin Integrity Full thickness tissue loss with exposed bone, tendon, or muscle. Often includes undermining and tunneling. May extend into muscle and/or supporting structures.   Dressing Appearance Intact   Drainage Amount Moderate   Drainage Characteristics/Odor Clear;Serous   Appearance Slough;Granulating   Tissue loss description Full thickness   Periwound Area Intact   Wound Edges Callused   Wound Length (cm) 2.2 cm   Wound Width (cm) 1.4 cm   Wound Depth (cm) 0.2 cm   Wound Volume (cm^3) 0.616 cm^3   Wound Surface Area (cm^2) 3.08 cm^2   Undermining (depth (cm)/location) 1.0cm @ 9-12 o';clock   Care Wound cleanser   Dressing Applied  (silver alginate, gauze, kelix, cloth tape)        Altered Skin Integrity 09/27/23 1527 Left Heel   Date First Assessed/Time First Assessed: 09/27/23 1527   Altered Skin Integrity Present on Admission - Did Patient arrive to the hospital with altered skin?: yes  Side: Left  Location: Heel   Wound Image    Description of Altered Skin Integrity Full thickness tissue loss. Base is covered by slough and/or eschar in the wound bed   Dressing Appearance Intact   Drainage Amount Moderate   Drainage Characteristics/Odor Clear;Serous   Appearance Slough   Tissue loss description Full thickness   Periwound Area Intact;Scar tissue   Wound Edges Defined   Wound Length (cm) 1.5 cm   Wound Width (cm) 0.7 cm   Wound Depth (cm) 0.2 cm   Wound Volume (cm^3) 0.21 cm^3   Wound Surface Area (cm^2) 1.05 cm^2   Care Wound cleanser   Dressing Applied  (silver alginate, gauze, abd, kerlix, cloth tape)        Altered Skin Integrity 11/30/23 1240 Right Buttocks Full thickness tissue loss. Subcutaneous fat may be visible but bone, tendon or muscle are not exposed   Date First Assessed/Time First Assessed: 11/30/23 1240   Altered Skin Integrity Present on Admission - Did Patient arrive to the hospital with altered skin?: yes  Side: Right  Location: Buttocks  Is this injury device  related?: No  Description of Alte...   Wound Image    Description of Altered Skin Integrity Full thickness tissue loss. Subcutaneous fat may be visible but bone, tendon or muscle are not exposed   Drainage Amount Small   Drainage Characteristics/Odor Clear;Serous   Appearance Slough   Tissue loss description Full thickness   Periwound Area Intact   Wound Edges Defined   Wound Length (cm) 3 cm   Wound Width (cm) 2 cm   Wound Depth (cm) 0.2 cm   Wound Volume (cm^3) 1.2 cm^3   Wound Surface Area (cm^2) 6 cm^2   Care Wound cleanser   Dressing Applied  (silver bordered foam)       Recommendations made to primary team for wound care with silver alginate, heel boots, pressure relief mattress . Orders placed.     11/30/2023

## 2023-11-30 NOTE — PROGRESS NOTES
"Pharmacokinetic Initial Assessment: IV Vancomycin    Assessment/Plan:    Initiate intravenous vancomycin with loading dose of 2000 mg once followed by a maintenance dose of vancomycin 1000mg IV every 12 hours  Desired empiric serum trough concentration is 15 to 20 mcg/mL  Draw vancomycin trough level 60 min prior to fourth dose on 12/01 at approximately 1300  Pharmacy will continue to follow and monitor vancomycin.      Please contact pharmacy at extension [phone] with any questions regarding this assessment.     Thank you for the consult,   Yassine Escalona       Patient brief summary:  Bianca Khan is a 58 y.o. male initiated on antimicrobial therapy with IV Vancomycin for treatment of suspected bone/joint infection    Drug Allergies:   Review of patient's allergies indicates:   Allergen Reactions    Baclofen Itching and Anxiety       Actual Body Weight:   72.6 kg    Renal Function:   Estimated Creatinine Clearance: 103.3 mL/min (based on SCr of 0.77 mg/dL).,     Dialysis Method (if applicable):  N/A    CBC (last 72 hours):  Recent Labs   Lab Result Units 11/29/23  1257 11/29/23  1644 11/30/23  0424   WBC x10(3)/mcL 17.28*  --  13.12*   Hgb g/dL 11.1*  --  9.9*   Hemoglobin A1c %  --  6.0  --    Hct % 36.8*  --  31.2*   Platelet x10(3)/mcL 398  --  357   Mono % % 9.2  --  8.8   Eos % % 0.0  --  0.7   Basophil % % 0.2  --  0.1       Metabolic Panel (last 72 hours):  Recent Labs   Lab Result Units 11/29/23  1257 11/30/23  0424   Sodium Level mmol/L 135* 137   Potassium Level mmol/L 4.7 4.2   Chloride mmol/L 109* 106   Carbon Dioxide mmol/L 16* 24   Glucose Level mg/dL 140* 90   Blood Urea Nitrogen mg/dL 12.6 9.6   Creatinine mg/dL 1.29* 0.77   Albumin Level g/dL 2.7* 2.3*   Bilirubin Total mg/dL 0.4 0.4   Alkaline Phosphatase unit/L 78 63   Aspartate Aminotransferase unit/L 22 16   Alanine Aminotransferase unit/L 13 11       Drug levels (last 3 results):  No results for input(s): "VANCOMYCINRA", "VANCORANDOM", " ""VANCOMYCINPE", "VANCOPEAK", "VANCOMYCINTR", "VANCOTROUGH" in the last 72 hours.    Microbiologic Results:  Microbiology Results (last 7 days)       Procedure Component Value Units Date/Time    Wound Culture [[id number]] Collected: 11/29/23 1536    Order Status: Resulted Specimen: Abscess from Knee, Right Updated: 11/29/23 1539    Blood Culture [[id number]] Collected: 11/29/23 1257    Order Status: Resulted Specimen: Blood Updated: 11/29/23 1339    Blood Culture [[id number]] Collected: 11/29/23 1257    Order Status: Resulted Specimen: Blood Updated: 11/29/23 1339            "

## 2023-11-30 NOTE — OP NOTE
OPERATIVE REPORT      Patient: Bianca Khan   : 1965    MRN: [id number]  Date: 2023      Surgeon:Prabhu Shen DO  Assistant: Grant Alexandra was essential, part of the procedure including deep hardware placement and deep closure.  No senior assistant was availible  Preoperative Diagnosis:  Right distal femur osteomyelitis, Right septic knee, Bilateral lower extremity paralysis, noncompliance  Postoperative Diagnosis: Same  Procedure:  Right knee arthrotomy and removal of foreign body due to infection 12811  Anesthesiologist: Pierre Dennis MD  OR Staff: Circulator: Teresita Salamanca RN; Caden Valencia RN  Physician Assistant: Ada Alexandra PA-C  Scrub Person: Nicolette Sanz ST; Josie Pierce ST  Implants: * No implants in log *  EBL: 50cc  Complications: None  Disposition: To PACU, stable    Indications: Bianca Khan is a 58 y.o. male presenting with the aforementioned injuries/findings. The procedure is indicated to  decrease infection burden.  The patient is awake and alert. After thorough discussion of the risks, benefits, expected outcomes, and alternatives to surgical intervention, the patient agreed to proceed with surgical treatment.  Specific risks discussed included, but were not limited to: superficial or deep infection, wound healing complications, DVT/PE, significant bleeding requiring transfusion, damage to named anatomic structures in the immediate area including named neurovascular structures, infection, nonunion, malunion and general risks of anesthesia.  The patient voiced understanding and written as well as verbal consent was obtained by myself prior to the procedure.       Patient has a lower extremity paralysis.  This is chronic.  Patient also has chronic infection of the right knee.  I have offered him above-the-knee amputation which he has declined.  At this time we will plan for exploration of his right knee with planning for amputation  versus further washout.   He does understand that retaining a limb in this condition could lead to death.  His family including his spouse is on board with the current plan in place for incision and drainage.  They also understand the risks of retaining infected limb.      Procedure Note:  The patient was brought back to the OR and placed supine on the OR table. After successful induction of anesthesia by anesthesia staff, the patient was positioned in the supine position and all bony prominences were padded appropriately.  The surgical field was then provisionally cleansed and then prepped and draped in the usual sterile fashion.    At this time a time-out was performed, with the correct patient, site, and procedure identified.  The universal time out as well as sign your site protocols were followed.  Preoperative antibiotics were verified as administered.      Attention is drawn to the right knee.  Patient has a large effusion.  There is a draining sinus tract to the superior aspect of the patella.  I then incised this with a 5 cm incision blunt dissected down a large fluid collection was then relief.  Patient has a old wound VAC sponge imbedded in the soft tissues deep inside the knee joint.  This fluid collection directly communicates with the hardware on the lateral aspect of his leg and he has soft bone proximally indicating likely osteomyelitis.    The incision(s) was/were then irrigated using copious sterile saline and then vancomyocin was added to the wound bed for prophylaxis. The surgical wound was closed in layered fashion.  The surgical site(s) was/were were sterilely cleansed and dressed.    The patient was then subsequently transferred to to PACU in a stable condition.    All sponge and needle counts were correct at the end of the case.  I was present and participated in all aspects of the procedure.    Prognosis:  The patient will be kept NWB .  Patient will receive DVT prophylaxis .   Patient has  old hardware in his right knee the communicates with the infection.  We will plan for removal of this tomorrow.  I will have another discussion with the patient and family about removing the limb.  I do believe this is in the patient's best interest and for his long term health.  He has been resistant in the past.  We will place him on the OR schedule for tomorrow.      This note/OR report was created with the assistance of  voice recognition software or phone  dictation.  There may be transcription errors as a result of using this technology however minimal. Effort has been made to assure accuracy of transcription but any obvious errors or omissions should be clarified with the author of the document.       Prabhu Shen, DO  Orthopedic Trauma Surgery
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
## 2023-11-30 NOTE — TRANSFER OF CARE
"Anesthesia Transfer of Care Note    Patient: Bianca Khan    Procedure(s) Performed: Procedure(s) (LRB):  INCISION AND DRAINAGE, LOWER EXTREMITY (Right)    Patient location: PACU    Anesthesia Type: general    Transport from OR: Transported from OR on room air with adequate spontaneous ventilation    Post pain: adequate analgesia    Post assessment: no apparent anesthetic complications    Post vital signs: stable    Level of consciousness: responds to stimulation    Nausea/Vomiting: no nausea/vomiting    Complications: none    Transfer of care protocol was followed      Last vitals: Visit Vitals  BP (!) 156/88 (BP Location: Right arm, Patient Position: Lying)   Pulse 84   Temp 36.6 °C (97.9 °F) (Oral)   Resp 12   Ht 5' 9" (1.753 m)   Wt 72.6 kg (160 lb)   SpO2 99%   BMI 23.63 kg/m²     "

## 2023-11-30 NOTE — PROGRESS NOTES
Pt has right knee chronic infection. He does have multiple comorbidities which make healing difficult. I have offered him an Amputation in the past and he continues to refuse. We will evaluate the pt in the morning for possible I and D.     Ac

## 2023-11-30 NOTE — ANESTHESIA PREPROCEDURE EVALUATION
11/30/2023  Bianca Khan is a 58 y.o., male with PMHx of paraplegia due to T1-6 spinal cord injury, osteomyelitis, PAF s/p pacemaker, neurogenic bladder s/p SPC placement who is admitted for septic arthritis of R knee.  To OR for I&D      Pre-op Assessment    I have reviewed the Patient Summary Reports.     I have reviewed the Nursing Notes. I have reviewed the NPO Status.   I have reviewed the Medications.     Review of Systems  Cardiovascular:  Exercise tolerance: poor                                               Physical Exam  General: Well nourished and Cooperative    Airway:  Mallampati: II   Mouth Opening: Normal  TM Distance: Normal  Tongue: Normal  Neck ROM: Normal ROM    Dental:  Edentulous    Chest/Lungs:  Clear to auscultation    Heart:  Rhythm: Regular Rhythm        Anesthesia Plan  Type of Anesthesia, risks & benefits discussed:    Anesthesia Type: Gen Supraglottic Airway  Intra-op Monitoring Plan: Standard ASA Monitors  Post Op Pain Control Plan: multimodal analgesia  Induction:  IV  Informed Consent: Informed consent signed with the Patient and all parties understand the risks and agree with anesthesia plan.  All questions answered.   ASA Score: 3  Day of Surgery Review of History & Physical: H&P Update referred to the surgeon/provider.    Ready For Surgery From Anesthesia Perspective.     .I explained anesthesia plan to patient/responsbile party if available.  Anesthesia consent done going over the material facts, risks, complications & alternatives, obtained which includes the possibility of altering the anesthesia plan.  I reviewed problem list, prior to admission medication list, appropriate labs, any workup, Xray, EKG etc noted below.  Patients condition is satisfactory to proceed with anesthesia plan unless otherwise noted (see anesthesia chart for details of the anesthesia plan  "carried out).      Pre-operative evaluation for Procedure(s) (LRB):  INCISION AND DRAINAGE, LOWER EXTREMITY (Right)    BP (!) 162/90   Pulse 78   Temp 36.6 °C (97.9 °F) (Oral)   Resp 11   Ht 5' 9" (1.753 m)   Wt 72.6 kg (160 lb)   SpO2 100%   BMI 23.63 kg/m²     Patient Active Problem List   Diagnosis    Neurogenic bladder    Anemia    Chronic pain    Chronic ulcer of ankle    Dysphagia    Fracture of distal end of tibia    Frequent UTI    Gastroesophageal reflux disease without esophagitis    Generalized anxiety disorder    Hypertension    Osteomyelitis    Presence of suprapubic catheter    Pure hypercholesterolemia    Spinal cord injury at T1-T6 level    Type 2 diabetes mellitus    Closed displaced spiral fracture of shaft of right tibia    Abscess of bursa of right knee    Acute respiratory failure with hypoxemia    Chronic infection of right knee    Pain and swelling of knee, right       Review of patient's allergies indicates:   Allergen Reactions    Baclofen Itching and Anxiety       Current Outpatient Medications   Medication Instructions    ALPRAZolam (XANAX) 0.25 mg, Oral, 2 times daily PRN    amLODIPine (NORVASC) 10 mg, Oral, Daily    atorvastatin (LIPITOR) 20 mg, Oral, Nightly    carvediloL (COREG) 12.5 mg, Oral, 2 times daily with meals    cloNIDine (CATAPRES) 0.1 mg, Oral, 3 times daily    docusate sodium (COLACE) 100 mg, Oral, 2 times daily    doxycycline (VIBRA-TABS) 100 mg, Oral, Every 12 hours    famotidine (PEPCID) 20 mg, Oral, 2 times daily    FENOFIBRATE NANOCRYSTALLIZED ORAL 48 mg, Oral, Daily    FLUoxetine 20 mg, Per G Tube, Daily    gentamicin (GARAMYCIN) 0.1 % ointment Topical (Top), 3 times daily    hydrALAZINE (APRESOLINE) 100 mg, Per G Tube, Every 8 hours    hydrOXYzine pamoate (VISTARIL) 50 mg, Oral, Nightly    lisinopriL (PRINIVIL,ZESTRIL) 20 mg, Oral, Daily    oxybutynin (DITROPAN) 5 mg, Per NG tube, 2 times daily    oxyCODONE-acetaminophen (PERCOCET)  mg per tablet 1 " tablet, Oral, Every 6 hours PRN    rivaroxaban (XARELTO) 20 mg, Oral, With dinner    TEMAZEPAM ORAL 30 mg, Oral, Nightly PRN    traZODone (DESYREL) 100 mg, Per G Tube, Nightly       Past Surgical History:   Procedure Laterality Date    FRACTURE SURGERY      INCISION AND DRAINAGE, LOWER EXTREMITY Right 2023    Procedure: INCISION AND DRAINAGE, LOWER EXTREMITY;  Surgeon: Prabhu Shen DO;  Location: [facility] OR;  Service: Orthopedics;  Laterality: Right;  supine bone foam wash stuff cultures    INSERTION OF INTRAMEDULLARY MARISA Right 08/10/2022    Procedure: INSERTION, INTRAMEDULLARY MARISA RIGHT TIBIA;  Surgeon: Jorge Orellana MD;  Location: [facility] OR;  Service: Orthopedics;  Laterality: Right;    JOINT REPLACEMENT      Ankel    SPINE SURGERY  2018       Social History     Socioeconomic History    Marital status:    Tobacco Use    Smoking status: Some Days     Current packs/day: 0.00     Average packs/day: 0.2 packs/day for 41.5 years (10.4 ttl pk-yrs)     Types: Cigars, Cigarettes     Start date: 1982     Last attempt to quit: 2023     Years since quittin.3    Smokeless tobacco: Never   Substance and Sexual Activity    Alcohol use: Not Currently     Alcohol/week: 2.0 standard drinks of alcohol     Types: 2 Cans of beer per week    Drug use: Not Currently     Types: Oxycodone    Sexual activity: Not Currently     Partners: Female     Birth control/protection: None     Social Determinants of Health     Financial Resource Strain: Low Risk  (2023)    Overall Financial Resource Strain (CARDIA)     Difficulty of Paying Living Expenses: Not very hard   Food Insecurity: No Food Insecurity (2023)    Hunger Vital Sign     Worried About Running Out of Food in the Last Year: Never true     Ran Out of Food in the Last Year: Never true   Transportation Needs: No Transportation Needs (2023)    PRAPARE - Transportation     Lack of Transportation (Medical): No     Lack of  "Transportation (Non-Medical): No   Physical Activity: Inactive (8/9/2023)    Exercise Vital Sign     Days of Exercise per Week: 0 days     Minutes of Exercise per Session: 0 min   Stress: No Stress Concern Present (8/9/2023)    [facility] - Occupational Stress Questionnaire     Feeling of Stress : Only a little   Social Connections: Moderately Isolated (10/3/2023)    Social Connection and Isolation Panel [NHANES]     Frequency of Communication with Friends and Family: Twice a week     Frequency of Social Gatherings with Friends and Family: Twice a week     Attends [employer] Services: Never     Active Member of Clubs or Organizations: No     Attends Club or Organization Meetings: Never     Marital Status:    Housing Stability: Low Risk  (8/9/2023)    Housing Stability Vital Sign     Unable to Pay for Housing in the Last Year: No     Number of Places Lived in the Last Year: 1     Unstable Housing in the Last Year: No       Lab Results   Component Value Date    WBC 13.12 (H) 11/30/2023    HGB 9.9 (L) 11/30/2023    HCT 31.2 (L) 11/30/2023    MCV 79.0 (L) 11/30/2023     11/30/2023          BMP  Lab Results   Component Value Date    HCT 31.2 (L) 11/30/2023     11/30/2023    K 4.2 11/30/2023    BUN 9.6 11/30/2023    CREATININE 0.77 11/30/2023    CALCIUM 8.4 11/30/2023        INR  No results for input(s): "PT", "INR", "PROTIME", "APTT" in the last 72 hours.        Diagnostic Studies:      EKG:  Results for orders placed or performed during the hospital encounter of 11/29/23   EKG 12-lead    Collection Time: 11/30/23  1:42 AM    Narrative    Test Reason : I48.91,    Vent. Rate : 072 BPM     Atrial Rate : 072 BPM     P-R Int : 136 ms          QRS Dur : 090 ms      QT Int : 374 ms       P-R-T Axes : 069 044 061 degrees     QTc Int : 409 ms    Normal sinus rhythm  Normal ECG  When compared with ECG of 04-AUG-2023 22:07,  Premature atrial complexes are no longer " Present  Confirmed by Carlos Ballesteros MD ([id number]) on 11/30/2023 12:50:08 PM    Referred By: DARSHAN SALAZAR           Confirmed By:Carlos Ballesteros MD

## 2023-12-01 ENCOUNTER — ANESTHESIA (OUTPATIENT)
Dept: SURGERY | Facility: HOSPITAL | Age: 58
DRG: 485 | End: 2023-12-01
Payer: MEDICARE

## 2023-12-01 ENCOUNTER — ANESTHESIA EVENT (OUTPATIENT)
Dept: SURGERY | Facility: HOSPITAL | Age: 58
DRG: 485 | End: 2023-12-01
Payer: MEDICARE

## 2023-12-01 LAB
BASOPHILS # BLD AUTO: 0.01 X10(3)/MCL
BASOPHILS NFR BLD AUTO: 0.1 %
EOSINOPHIL # BLD AUTO: 0.06 X10(3)/MCL (ref 0–0.9)
EOSINOPHIL NFR BLD AUTO: 0.8 %
ERYTHROCYTE [DISTWIDTH] IN BLOOD BY AUTOMATED COUNT: 17.9 % (ref 11.5–17)
GRAM STN SPEC: NORMAL
GRAM STN SPEC: NORMAL
HCT VFR BLD AUTO: 34.1 % (ref 42–52)
HGB BLD-MCNC: 10.6 G/DL (ref 14–18)
IMM GRANULOCYTES # BLD AUTO: 0.02 X10(3)/MCL (ref 0–0.04)
IMM GRANULOCYTES NFR BLD AUTO: 0.3 %
LYMPHOCYTES # BLD AUTO: 2.75 X10(3)/MCL (ref 0.6–4.6)
LYMPHOCYTES NFR BLD AUTO: 37.8 %
MCH RBC QN AUTO: 24.3 PG (ref 27–31)
MCHC RBC AUTO-ENTMCNC: 31.1 G/DL (ref 33–36)
MCV RBC AUTO: 78.2 FL (ref 80–94)
MONOCYTES # BLD AUTO: 0.6 X10(3)/MCL (ref 0.1–1.3)
MONOCYTES NFR BLD AUTO: 8.2 %
NEUTROPHILS # BLD AUTO: 3.84 X10(3)/MCL (ref 2.1–9.2)
NEUTROPHILS NFR BLD AUTO: 52.8 %
NRBC BLD AUTO-RTO: 0 %
PLATELET # BLD AUTO: 289 X10(3)/MCL (ref 130–400)
PLATELETS.RETICULATED NFR BLD AUTO: 3.7 % (ref 0.9–11.2)
PMV BLD AUTO: 10.2 FL (ref 7.4–10.4)
POCT GLUCOSE: 110 MG/DL (ref 70–110)
POCT GLUCOSE: 139 MG/DL (ref 70–110)
POCT GLUCOSE: 190 MG/DL (ref 70–110)
RBC # BLD AUTO: 4.36 X10(6)/MCL (ref 4.7–6.1)
WBC # SPEC AUTO: 7.28 X10(3)/MCL (ref 4.5–11.5)

## 2023-12-01 PROCEDURE — 27303 PR INCIS DEEP BONE LESN,FEMUR/KNEE: ICD-10-PCS | Mod: 58,RT,, | Performed by: ORTHOPAEDIC SURGERY

## 2023-12-01 PROCEDURE — 27303 DRAINAGE OF BONE LESION: CPT | Mod: 58,RT,, | Performed by: ORTHOPAEDIC SURGERY

## 2023-12-01 PROCEDURE — 63600175 PHARM REV CODE 636 W HCPCS: Performed by: NURSE PRACTITIONER

## 2023-12-01 PROCEDURE — 25000003 PHARM REV CODE 250: Performed by: NURSE PRACTITIONER

## 2023-12-01 PROCEDURE — 71000033 HC RECOVERY, INTIAL HOUR: Performed by: ORTHOPAEDIC SURGERY

## 2023-12-01 PROCEDURE — 36000706: Performed by: ORTHOPAEDIC SURGERY

## 2023-12-01 PROCEDURE — 11000001 HC ACUTE MED/SURG PRIVATE ROOM

## 2023-12-01 PROCEDURE — 25000003 PHARM REV CODE 250

## 2023-12-01 PROCEDURE — C1894 INTRO/SHEATH, NON-LASER: HCPCS | Performed by: ORTHOPAEDIC SURGERY

## 2023-12-01 PROCEDURE — 63600175 PHARM REV CODE 636 W HCPCS: Performed by: PHYSICIAN ASSISTANT

## 2023-12-01 PROCEDURE — 25000003 PHARM REV CODE 250: Performed by: ORTHOPAEDIC SURGERY

## 2023-12-01 PROCEDURE — 63600175 PHARM REV CODE 636 W HCPCS

## 2023-12-01 PROCEDURE — 25000003 PHARM REV CODE 250: Performed by: INTERNAL MEDICINE

## 2023-12-01 PROCEDURE — 20700 PR MANUAL PREP AND INSERTION DEEP DRUG DELIVERY DEV: ICD-10-PCS | Mod: AS,,, | Performed by: PHYSICIAN ASSISTANT

## 2023-12-01 PROCEDURE — 85025 COMPLETE CBC W/AUTO DIFF WBC: CPT | Performed by: PHYSICIAN ASSISTANT

## 2023-12-01 PROCEDURE — D9220A PRA ANESTHESIA: ICD-10-PCS | Mod: CRNA,,,

## 2023-12-01 PROCEDURE — D9220A PRA ANESTHESIA: Mod: CRNA,,,

## 2023-12-01 PROCEDURE — 27303 DRAINAGE OF BONE LESION: CPT | Mod: AS,RT,, | Performed by: PHYSICIAN ASSISTANT

## 2023-12-01 PROCEDURE — 36000707: Performed by: ORTHOPAEDIC SURGERY

## 2023-12-01 PROCEDURE — 37000008 HC ANESTHESIA 1ST 15 MINUTES: Performed by: ORTHOPAEDIC SURGERY

## 2023-12-01 PROCEDURE — 63600175 PHARM REV CODE 636 W HCPCS: Performed by: ORTHOPAEDIC SURGERY

## 2023-12-01 PROCEDURE — 27201423 OPTIME MED/SURG SUP & DEVICES STERILE SUPPLY: Performed by: ORTHOPAEDIC SURGERY

## 2023-12-01 PROCEDURE — S4991 NICOTINE PATCH NONLEGEND: HCPCS | Performed by: NURSE PRACTITIONER

## 2023-12-01 PROCEDURE — 20700 MNL PREP&INSJ DP RX DLVR DEV: CPT | Mod: ,,, | Performed by: ORTHOPAEDIC SURGERY

## 2023-12-01 PROCEDURE — D9220A PRA ANESTHESIA: ICD-10-PCS | Mod: ANES,,, | Performed by: ANESTHESIOLOGY

## 2023-12-01 PROCEDURE — 20700 MNL PREP&INSJ DP RX DLVR DEV: CPT | Mod: AS,,, | Performed by: PHYSICIAN ASSISTANT

## 2023-12-01 PROCEDURE — D9220A PRA ANESTHESIA: Mod: ANES,,, | Performed by: ANESTHESIOLOGY

## 2023-12-01 PROCEDURE — 86923 COMPATIBILITY TEST ELECTRIC: CPT | Mod: 91 | Performed by: ORTHOPAEDIC SURGERY

## 2023-12-01 PROCEDURE — 63600175 PHARM REV CODE 636 W HCPCS: Performed by: ANESTHESIOLOGY

## 2023-12-01 PROCEDURE — 27303 PR INCIS DEEP BONE LESN,FEMUR/KNEE: ICD-10-PCS | Mod: AS,RT,, | Performed by: PHYSICIAN ASSISTANT

## 2023-12-01 PROCEDURE — 25000003 PHARM REV CODE 250: Performed by: PHYSICIAN ASSISTANT

## 2023-12-01 PROCEDURE — 20700 PR MANUAL PREP AND INSERTION DEEP DRUG DELIVERY DEV: ICD-10-PCS | Mod: ,,, | Performed by: ORTHOPAEDIC SURGERY

## 2023-12-01 PROCEDURE — 37000009 HC ANESTHESIA EA ADD 15 MINS: Performed by: ORTHOPAEDIC SURGERY

## 2023-12-01 DEVICE — IMPLANTABLE DEVICE: Type: IMPLANTABLE DEVICE | Site: LEG | Status: FUNCTIONAL

## 2023-12-01 RX ORDER — IBUPROFEN 200 MG
1 TABLET ORAL DAILY
Status: DISCONTINUED | OUTPATIENT
Start: 2023-12-01 | End: 2023-12-08 | Stop reason: HOSPADM

## 2023-12-01 RX ORDER — HYDROMORPHONE HYDROCHLORIDE 2 MG/ML
0.4 INJECTION, SOLUTION INTRAMUSCULAR; INTRAVENOUS; SUBCUTANEOUS EVERY 5 MIN PRN
Status: DISCONTINUED | OUTPATIENT
Start: 2023-12-01 | End: 2023-12-01 | Stop reason: HOSPADM

## 2023-12-01 RX ORDER — MIDAZOLAM HYDROCHLORIDE 1 MG/ML
2 INJECTION INTRAMUSCULAR; INTRAVENOUS ONCE AS NEEDED
Status: CANCELLED | OUTPATIENT
Start: 2023-12-01 | End: 2035-04-29

## 2023-12-01 RX ORDER — ACETAMINOPHEN 10 MG/ML
INJECTION, SOLUTION INTRAVENOUS
Status: DISCONTINUED | OUTPATIENT
Start: 2023-12-01 | End: 2023-12-01

## 2023-12-01 RX ORDER — FENTANYL CITRATE 50 UG/ML
100 INJECTION, SOLUTION INTRAMUSCULAR; INTRAVENOUS
Status: COMPLETED | OUTPATIENT
Start: 2023-12-01 | End: 2023-12-01

## 2023-12-01 RX ORDER — HYDROMORPHONE HYDROCHLORIDE 2 MG/ML
0.2 INJECTION, SOLUTION INTRAMUSCULAR; INTRAVENOUS; SUBCUTANEOUS EVERY 5 MIN PRN
Status: DISCONTINUED | OUTPATIENT
Start: 2023-12-01 | End: 2023-12-01 | Stop reason: HOSPADM

## 2023-12-01 RX ORDER — FENTANYL CITRATE 50 UG/ML
INJECTION, SOLUTION INTRAMUSCULAR; INTRAVENOUS
Status: DISCONTINUED | OUTPATIENT
Start: 2023-12-01 | End: 2023-12-01

## 2023-12-01 RX ORDER — PROCHLORPERAZINE EDISYLATE 5 MG/ML
5 INJECTION INTRAMUSCULAR; INTRAVENOUS EVERY 30 MIN PRN
Status: DISCONTINUED | OUTPATIENT
Start: 2023-12-01 | End: 2023-12-01 | Stop reason: HOSPADM

## 2023-12-01 RX ORDER — OXYCODONE AND ACETAMINOPHEN 10; 325 MG/1; MG/1
1 TABLET ORAL EVERY 6 HOURS PRN
Status: DISCONTINUED | OUTPATIENT
Start: 2023-12-01 | End: 2023-12-02

## 2023-12-01 RX ORDER — SODIUM CHLORIDE, SODIUM GLUCONATE, SODIUM ACETATE, POTASSIUM CHLORIDE AND MAGNESIUM CHLORIDE 30; 37; 368; 526; 502 MG/100ML; MG/100ML; MG/100ML; MG/100ML; MG/100ML
INJECTION, SOLUTION INTRAVENOUS CONTINUOUS
Status: CANCELLED | OUTPATIENT
Start: 2023-12-01 | End: 2023-12-31

## 2023-12-01 RX ORDER — PROPOFOL 10 MG/ML
VIAL (ML) INTRAVENOUS
Status: DISCONTINUED | OUTPATIENT
Start: 2023-12-01 | End: 2023-12-01

## 2023-12-01 RX ORDER — ROCURONIUM BROMIDE 10 MG/ML
INJECTION, SOLUTION INTRAVENOUS
Status: DISCONTINUED | OUTPATIENT
Start: 2023-12-01 | End: 2023-12-01

## 2023-12-01 RX ORDER — MEPERIDINE HYDROCHLORIDE 25 MG/ML
12.5 INJECTION INTRAMUSCULAR; INTRAVENOUS; SUBCUTANEOUS EVERY 10 MIN PRN
Status: DISCONTINUED | OUTPATIENT
Start: 2023-12-01 | End: 2023-12-01 | Stop reason: HOSPADM

## 2023-12-01 RX ORDER — IPRATROPIUM BROMIDE AND ALBUTEROL SULFATE 2.5; .5 MG/3ML; MG/3ML
3 SOLUTION RESPIRATORY (INHALATION)
Status: DISCONTINUED | OUTPATIENT
Start: 2023-12-01 | End: 2023-12-01 | Stop reason: HOSPADM

## 2023-12-01 RX ORDER — ONDANSETRON 2 MG/ML
4 INJECTION INTRAMUSCULAR; INTRAVENOUS DAILY PRN
Status: DISCONTINUED | OUTPATIENT
Start: 2023-12-01 | End: 2023-12-01 | Stop reason: HOSPADM

## 2023-12-01 RX ORDER — ONDANSETRON HYDROCHLORIDE 2 MG/ML
INJECTION, SOLUTION INTRAMUSCULAR; INTRAVENOUS
Status: DISCONTINUED | OUTPATIENT
Start: 2023-12-01 | End: 2023-12-01

## 2023-12-01 RX ORDER — LORAZEPAM 2 MG/ML
0.25 INJECTION INTRAMUSCULAR ONCE AS NEEDED
Status: DISCONTINUED | OUTPATIENT
Start: 2023-12-01 | End: 2023-12-01 | Stop reason: HOSPADM

## 2023-12-01 RX ORDER — LIDOCAINE HYDROCHLORIDE 10 MG/ML
1 INJECTION, SOLUTION EPIDURAL; INFILTRATION; INTRACAUDAL; PERINEURAL ONCE
Status: CANCELLED | OUTPATIENT
Start: 2023-12-01 | End: 2023-12-01

## 2023-12-01 RX ORDER — FENTANYL CITRATE 50 UG/ML
INJECTION, SOLUTION INTRAMUSCULAR; INTRAVENOUS
Status: DISPENSED
Start: 2023-12-01 | End: 2023-12-01

## 2023-12-01 RX ORDER — OXYCODONE AND ACETAMINOPHEN 5; 325 MG/1; MG/1
1 TABLET ORAL EVERY 6 HOURS PRN
Status: DISCONTINUED | OUTPATIENT
Start: 2023-12-01 | End: 2023-12-08 | Stop reason: HOSPADM

## 2023-12-01 RX ORDER — LIDOCAINE HYDROCHLORIDE 20 MG/ML
INJECTION, SOLUTION EPIDURAL; INFILTRATION; INTRACAUDAL; PERINEURAL
Status: DISCONTINUED | OUTPATIENT
Start: 2023-12-01 | End: 2023-12-01

## 2023-12-01 RX ORDER — ONDANSETRON 4 MG/1
8 TABLET, ORALLY DISINTEGRATING ORAL EVERY 6 HOURS PRN
Status: CANCELLED | OUTPATIENT
Start: 2023-12-01

## 2023-12-01 RX ADMIN — CEFAZOLIN SODIUM 2 G: 2 SOLUTION INTRAVENOUS at 12:12

## 2023-12-01 RX ADMIN — PROPOFOL 50 MG: 10 INJECTION, EMULSION INTRAVENOUS at 12:12

## 2023-12-01 RX ADMIN — AMPICILLIN 2000 MG: 500 CAPSULE ORAL at 05:12

## 2023-12-01 RX ADMIN — FENTANYL CITRATE 100 MCG: 50 INJECTION, SOLUTION INTRAMUSCULAR; INTRAVENOUS at 10:12

## 2023-12-01 RX ADMIN — CARVEDILOL 12.5 MG: 12.5 TABLET, FILM COATED ORAL at 07:12

## 2023-12-01 RX ADMIN — PROPOFOL 150 MG: 10 INJECTION, EMULSION INTRAVENOUS at 10:12

## 2023-12-01 RX ADMIN — FENTANYL CITRATE 25 MCG: 50 INJECTION, SOLUTION INTRAMUSCULAR; INTRAVENOUS at 12:12

## 2023-12-01 RX ADMIN — HYDROCODONE BITARTRATE AND ACETAMINOPHEN 1 TABLET: 10; 325 TABLET ORAL at 01:12

## 2023-12-01 RX ADMIN — SUGAMMADEX 200 MG: 100 INJECTION, SOLUTION INTRAVENOUS at 12:12

## 2023-12-01 RX ADMIN — AMPICILLIN 2000 MG: 500 CAPSULE ORAL at 06:12

## 2023-12-01 RX ADMIN — SODIUM CHLORIDE, SODIUM GLUCONATE, SODIUM ACETATE, POTASSIUM CHLORIDE AND MAGNESIUM CHLORIDE: 526; 502; 368; 37; 30 INJECTION, SOLUTION INTRAVENOUS at 10:12

## 2023-12-01 RX ADMIN — METHOCARBAMOL 750 MG: 750 TABLET ORAL at 08:12

## 2023-12-01 RX ADMIN — ROCURONIUM BROMIDE 70 MG: 10 SOLUTION INTRAVENOUS at 10:12

## 2023-12-01 RX ADMIN — AMPICILLIN 2000 MG: 500 CAPSULE ORAL at 01:12

## 2023-12-01 RX ADMIN — METHOCARBAMOL 750 MG: 750 TABLET ORAL at 03:12

## 2023-12-01 RX ADMIN — FENTANYL CITRATE 50 MCG: 50 INJECTION, SOLUTION INTRAMUSCULAR; INTRAVENOUS at 10:12

## 2023-12-01 RX ADMIN — PROPOFOL 30 MG: 10 INJECTION, EMULSION INTRAVENOUS at 12:12

## 2023-12-01 RX ADMIN — MORPHINE SULFATE 3 MG: 4 INJECTION, SOLUTION INTRAMUSCULAR; INTRAVENOUS at 09:12

## 2023-12-01 RX ADMIN — PROPOFOL 20 MG: 10 INJECTION, EMULSION INTRAVENOUS at 12:12

## 2023-12-01 RX ADMIN — LIDOCAINE HYDROCHLORIDE 80 MG: 20 INJECTION, SOLUTION EPIDURAL; INFILTRATION; INTRACAUDAL; PERINEURAL at 10:12

## 2023-12-01 RX ADMIN — ATORVASTATIN CALCIUM 20 MG: 10 TABLET, FILM COATED ORAL at 08:12

## 2023-12-01 RX ADMIN — AMPICILLIN 2000 MG: 500 CAPSULE ORAL at 12:12

## 2023-12-01 RX ADMIN — MORPHINE SULFATE 3 MG: 4 INJECTION, SOLUTION INTRAMUSCULAR; INTRAVENOUS at 05:12

## 2023-12-01 RX ADMIN — CEFAZOLIN SODIUM 2 G: 2 SOLUTION INTRAVENOUS at 09:12

## 2023-12-01 RX ADMIN — ROCURONIUM BROMIDE 20 MG: 10 SOLUTION INTRAVENOUS at 11:12

## 2023-12-01 RX ADMIN — HYDROCODONE BITARTRATE AND ACETAMINOPHEN 1 TABLET: 10; 325 TABLET ORAL at 12:12

## 2023-12-01 RX ADMIN — CARVEDILOL 12.5 MG: 12.5 TABLET, FILM COATED ORAL at 05:12

## 2023-12-01 RX ADMIN — ACETAMINOPHEN 1000 MG: 10 INJECTION, SOLUTION INTRAVENOUS at 12:12

## 2023-12-01 RX ADMIN — FLUOXETINE 20 MG: 20 CAPSULE ORAL at 07:12

## 2023-12-01 RX ADMIN — OXYCODONE AND ACETAMINOPHEN 1 TABLET: 10; 325 TABLET ORAL at 09:12

## 2023-12-01 RX ADMIN — OXYBUTYNIN CHLORIDE 5 MG: 5 TABLET ORAL at 07:12

## 2023-12-01 RX ADMIN — ENOXAPARIN SODIUM 40 MG: 40 INJECTION SUBCUTANEOUS at 05:12

## 2023-12-01 RX ADMIN — AMPICILLIN SODIUM 2 G: 2 INJECTION, POWDER, FOR SOLUTION INTRAMUSCULAR; INTRAVENOUS at 08:12

## 2023-12-01 RX ADMIN — NICOTINE 1 PATCH: 21 PATCH, EXTENDED RELEASE TRANSDERMAL at 01:12

## 2023-12-01 RX ADMIN — ONDANSETRON 4 MG: 2 INJECTION INTRAMUSCULAR; INTRAVENOUS at 12:12

## 2023-12-01 RX ADMIN — OXYBUTYNIN CHLORIDE 5 MG: 5 TABLET ORAL at 08:12

## 2023-12-01 NOTE — PLAN OF CARE
Problem: Adult Inpatient Plan of Care  Goal: Plan of Care Review  Outcome: Ongoing, Progressing  Goal: Patient-Specific Goal (Individualized)  Outcome: Ongoing, Progressing  Goal: Absence of Hospital-Acquired Illness or Injury  Outcome: Ongoing, Progressing  Goal: Optimal Comfort and Wellbeing  Outcome: Ongoing, Progressing  Goal: Readiness for Transition of Care  Outcome: Ongoing, Progressing     Problem: Infection  Goal: Absence of Infection Signs and Symptoms  Outcome: Ongoing, Progressing     Problem: Impaired Wound Healing  Goal: Optimal Wound Healing  Outcome: Ongoing, Progressing     Problem: Diabetes Comorbidity  Goal: Blood Glucose Level Within Targeted Range  Outcome: Ongoing, Progressing     Problem: Skin Injury Risk Increased  Goal: Skin Health and Integrity  Outcome: Ongoing, Progressing     Problem: Fall Injury Risk  Goal: Absence of Fall and Fall-Related Injury  Outcome: Ongoing, Progressing

## 2023-12-01 NOTE — OR NURSING
A. Wattigny, NP performed a suprapubic catheter change in the OR after right lower extremity I&D and hardware removal. SUNDAY Quinn was consulted because old catheter was leaking. New suprapubic catheter is 20 Fr. with a 30mL balloon. Catheter secured to leg with securing device

## 2023-12-01 NOTE — ANESTHESIA POSTPROCEDURE EVALUATION
Anesthesia Post Evaluation    Patient: Bianca Khan    Procedure(s) Performed: Procedure(s) (LRB):  INCISION AND DRAINAGE, LOWER EXTREMITY (Right)    Final Anesthesia Type: general (/Regional//MAC)      Patient location during evaluation: PACU  Post-procedure mental status: @ basline.  Pain management: adequate    PONV status: See postop meds for drugs used to control n/v if any.  Anesthetic complications: no      Cardiovascular status: blood pressure returned to baseline  Respiratory status: @ baseline.  Hydration status: euvolemic                Vitals Value Taken Time   /75 12/01/23 0430   Temp 36.4 °C (97.6 °F) 12/01/23 0430   Pulse 83 12/01/23 0430   Resp 18 12/01/23 0037   SpO2 97 % 12/01/23 0430         Event Time   Out of Recovery 11/30/2023 15:40:00         Pain/Adriana Score: Pain Rating Prior to Med Admin: 9 (12/1/2023 12:37 AM)  Pain Rating Post Med Admin: 0 (12/1/2023  1:37 AM)  Adriana Score: 10 (11/30/2023  3:39 PM)

## 2023-12-01 NOTE — NURSING
Nurses Note -- 4 Eyes      11/30/2023   6:51 PM      Skin assessed during: Admit      [] No Altered Skin Integrity Present    []Prevention Measures Documented      [x] Yes- Altered Skin Integrity Present or Discovered   [] LDA Added if Not in Epic (Describe Wound)   [x] New Altered Skin Integrity was Present on Admit and Documented in LDA   [x] Wound Image Taken    Wound Care Consulted? Yes    Attending Nurse:  Rika Bernabe RN/Staff Member:   Amadeo Prajapati RN

## 2023-12-01 NOTE — CONSULTS
Bianca Khan 1965  [id number]  12/1/2023    REASON FOR CONSULTATION: SPT exchange    HPI: The patient is a 58-year-old male with past medical history of 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 and neurogenic bladder status post SP tube, diabetes mellitus, hypertension, atrial fibrillation. He is a patient of Dr. Simental. He has his SP tube exchanged every 2 weeks per NSI.  He presented to the emergency room on 11/29/2023 with right knee pain and purulent drainage.  He was on chronic suppression with doxycycline for right knee infection.  He has been hemodynamically stable and afebrile since arrival.  Labs on arrival revealed WBC 17.28 (7.28 today), H&H 11.1/36.8, BUN and creatinine 12.6/1.29 (creatinine 0.77 yesterday).  He was taken to the operating room today with orthopedic surgery for I&D of right femur osteomyelitis with removal of hardware.  Urology was consulted for SP tube exchange.      The patient was seen while in the operating room after surgery was completed in his suprapubic catheter was exchanged at that time.    Past Medical History:   Diagnosis Date    Arthritis     Chronic ulcer of ankle 05/26/2022    Frequent UTI 07/02/2019    Generalized anxiety disorder 05/26/2022    Neurogenic bladder 05/26/2022    Osteomyelitis 05/26/2022    Presence of suprapubic catheter 05/26/2022    Pure hypercholesterolemia 05/26/2022    Retention of urine, unspecified 08/09/2019    Spinal cord injury at T1-T6 level 04/20/2018     Past Surgical History:   Procedure Laterality Date    FRACTURE SURGERY  2021    INCISION AND DRAINAGE, LOWER EXTREMITY Right 06/29/2023    Procedure: INCISION AND DRAINAGE, LOWER EXTREMITY;  Surgeon: Prabhu Shen DO;  Location: [facility];  Service: Orthopedics;  Laterality: Right;  supine bone foam wash stuff cultures    INCISION AND DRAINAGE, LOWER EXTREMITY Right 11/30/2023    Procedure: INCISION AND DRAINAGE, LOWER EXTREMITY;  Surgeon: Prabhu Shen DO;  Location: [facility];   Service: Orthopedics;  Laterality: Right;  supine any table bone foam wash stuff possible wound vac    INSERTION OF INTRAMEDULLARY MARISA Right 08/10/2022    Procedure: INSERTION, INTRAMEDULLARY MARISA RIGHT TIBIA;  Surgeon: Jorge Orellana MD;  Location: [facility];  Service: Orthopedics;  Laterality: Right;    JOINT REPLACEMENT      Ankel    SPINE SURGERY  2018     Family History   Problem Relation Age of Onset    No Known Problems Mother     No Known Problems Father      Social History     Tobacco Use    Smoking status: Some Days     Current packs/day: 0.00     Average packs/day: 0.2 packs/day for 41.5 years (10.4 ttl pk-yrs)     Types: Cigars, Cigarettes     Start date: 1982     Last attempt to quit: 2023     Years since quittin.3    Smokeless tobacco: Never   Substance Use Topics    Alcohol use: Not Currently     Alcohol/week: 2.0 standard drinks of alcohol     Types: 2 Cans of beer per week    Drug use: Not Currently     Types: Oxycodone     Current Facility-Administered Medications   Medication Dose Route Frequency Provider Last Rate Last Admin    albuterol-ipratropium 2.5 mg-0.5 mg/3 mL nebulizer solution 3 mL  3 mL Nebulization Q1H PRN Cesar Meraz Jr., MD        ampicillin 500 MG capsule 2,000 mg  2,000 mg Oral Q6H Mitch Butler MD   2,000 mg at 23 0547    atorvastatin tablet 20 mg  20 mg Oral Nightly Hermila Cohen, FNP   20 mg at 23    carvediloL tablet 12.5 mg  12.5 mg Oral BID WM Hermila Cohen, FNP   12.5 mg at 23 0744    dextrose 10% bolus 125 mL 125 mL  12.5 g Intravenous PRN Delmy Hawk AGACNP-BC        dextrose 10% bolus 250 mL 250 mL  25 g Intravenous PRN Delmy Hawk AGACNP-BC        enoxaparin injection 40 mg  40 mg Subcutaneous Daily Ada Alexandra PA-C   40 mg at 23 1725    fentaNYL (SUBLIMAZE) 50 mcg/mL injection             FLUoxetine capsule 20 mg  20 mg Oral Daily Hermila Cohen, FNP   20 mg at 23  0744    glucagon (human recombinant) injection 1 mg  1 mg Intramuscular PRN Delmy Hawk AGACNP-BC        glucose chewable tablet 16 g  16 g Oral PRN Delmy Hawk AGADAYRONP-BC        glucose chewable tablet 24 g  24 g Oral PRN Delmy Hawk AGACNP-BC        HYDROcodone-acetaminophen  mg per tablet 1 tablet  1 tablet Oral Q4H PRN Ada Alexandra PA-C   1 tablet at 12/01/23 0037    HYDROcodone-acetaminophen 5-325 mg per tablet 1 tablet  1 tablet Oral Q4H PRN Ada Alexandra, PA-C   1 tablet at 11/30/23 1817    HYDROmorphone (PF) injection 0.2 mg  0.2 mg Intravenous Q5 Min PRN Cesar Meraz Jr., MD        HYDROmorphone (PF) injection 0.4 mg  0.4 mg Intravenous Q5 Min PRN Cesar Meraz Jr., MD        insulin aspart U-100 injection 1-10 Units  1-10 Units Subcutaneous QID (AC + HS) PRN Delmy Hawk AGACNP-BC        LORazepam injection 0.25 mg  0.25 mg Intravenous Once PRN Cesar Meraz Jr., MD        meperidine (PF) injection 12.5 mg  12.5 mg Intravenous Q10 Min PRN Cesar Mreaz Jr., MD        methocarbamoL tablet 750 mg  750 mg Oral TID Ada Alexandra PA-C   750 mg at 11/30/23 2024    morphine injection 3 mg  3 mg Intravenous Q4H PRN Hermila Cohen FNP   3 mg at 11/30/23 1218    nicotine 21 mg/24 hr 1 patch  1 patch Transdermal Daily Hermila Cohen FNP   1 patch at 12/01/23 0127    NON FORMULARY MEDICATION 30 mg  30 mg Oral Nightly PRN Hermila Cohen FNP        ondansetron injection 4 mg  4 mg Intravenous Q6H PRN Ada Alexandra PA-C        ondansetron injection 4 mg  4 mg Intravenous Daily PRN Cesar Meraz Jr., MD        oxybutynin tablet 5 mg  5 mg Oral BID Hermila Cohen FNP   5 mg at 12/01/23 0744    polyethylene glycol packet 17 g  17 g Oral Daily Ada Alexandra, PA-C        prochlorperazine injection Soln 5 mg  5 mg Intravenous Q30 Min PRN Cesar Meraz Jr., MD        zolpidem tablet 5 mg  5 mg Oral Nightly PRN Hermila Cohen, FNP    5 mg at 11/30/23 0142     Facility-Administered Medications Ordered in Other Encounters   Medication Dose Route Frequency Provider Last Rate Last Admin    acetaminophen 1,000 mg/100 mL (10 mg/mL) injection   Intravenous PRN Josué Nguyen CRNA   1,000 mg at 12/01/23 1206    electrolyte-A infusion   Intravenous Continuous PRN Josué Nguyen CRNA   New Bag at 12/01/23 1041    fentaNYL injection   Intravenous PRN Josué Nguyen CRNA   25 mcg at 12/01/23 1239    LIDOcaine (PF) 20 mg/mL (2%) injection   Intravenous PRN Josué Nguyen CRNA   80 mg at 12/01/23 1050    ondansetron HCl (PF) injection   Intravenous PRN Josué Nguyen CRNA   4 mg at 12/01/23 1207    propofol (DIPRIVAN) 10 mg/mL infusion   Intravenous PRN Josué Nguyen CRNA   50 mg at 12/01/23 1241    rocuronium injection   Intravenous PRN Josué Nguyen CRNA   20 mg at 12/01/23 1118    sugammadex (BRIDION) 100 mg/mL injection   Intravenous PRN Josué Nguyen CRNA   200 mg at 12/01/23 1207     Review of patient's allergies indicates:   Allergen Reactions    Baclofen Itching and Anxiety       ROS: 12 point review of systems negative other than the HPI    PHYSICAL EXAM:  Vitals:    12/01/23 0430 12/01/23 0700 12/01/23 0811 12/01/23 1019   BP: (!) 159/75  (!) 160/81    BP Location:   Right arm    Patient Position:   Sitting    Pulse: 83  74    Resp:   20 18   Temp: 97.6 °F (36.4 °C)      TempSrc: Oral      SpO2: 97% 97% 98%    Weight:       Height:           Intake/Output Summary (Last 24 hours) at 12/1/2023 1252  Last data filed at 12/1/2023 1249  Gross per 24 hour   Intake 1390 ml   Output 3175 ml   Net -1785 ml       GEN: Patient in OR   ABD: soft, ND; SPT exchanged without difficulty    LABS:  Recent Results (from the past 24 hour(s))   POCT glucose    Collection Time: 11/30/23  1:00 PM   Result Value Ref Range    POCT Glucose 90 70 - 110 mg/dL   Gram Stain    Collection Time: 11/30/23  2:31 PM    Specimen: Knee, Right; Abscess   Result Value Ref Range     GRAM STAIN Rare WBC observed     GRAM STAIN No bacteria seen    Wound Culture    Collection Time: 11/30/23  2:31 PM    Specimen: Knee, Right; Abscess   Result Value Ref Range    Wound Culture No Growth At 24 Hours    POCT glucose    Collection Time: 11/30/23  3:29 PM   Result Value Ref Range    POCT Glucose 133 (H) 70 - 110 mg/dL   POCT glucose    Collection Time: 12/01/23  6:02 AM   Result Value Ref Range    POCT Glucose 110 70 - 110 mg/dL   CBC with Differential    Collection Time: 12/01/23  6:15 AM   Result Value Ref Range    WBC 7.28 4.50 - 11.50 x10(3)/mcL    RBC 4.36 (L) 4.70 - 6.10 x10(6)/mcL    Hgb 10.6 (L) 14.0 - 18.0 g/dL    Hct 34.1 (L) 42.0 - 52.0 %    MCV 78.2 (L) 80.0 - 94.0 fL    MCH 24.3 (L) 27.0 - 31.0 pg    MCHC 31.1 (L) 33.0 - 36.0 g/dL    RDW 17.9 (H) 11.5 - 17.0 %    Platelet 289 130 - 400 x10(3)/mcL    MPV 10.2 7.4 - 10.4 fL    Neut % 52.8 %    Lymph % 37.8 %    Mono % 8.2 %    Eos % 0.8 %    Basophil % 0.1 %    Lymph # 2.75 0.6 - 4.6 x10(3)/mcL    Neut # 3.84 2.1 - 9.2 x10(3)/mcL    Mono # 0.60 0.1 - 1.3 x10(3)/mcL    Eos # 0.06 0 - 0.9 x10(3)/mcL    Baso # 0.01 <=0.2 x10(3)/mcL    IG# 0.02 0 - 0.04 x10(3)/mcL    IG% 0.3 %    NRBC% 0.0 %    IPF 3.7 0.9 - 11.2 %       IMAGING:  Imaging Results              X-Ray Chest 1 View (Final result)  Result time 11/30/23 07:19:57      Final result by Dipak Multani MD (11/30/23 07:19:57)                   Impression:      No acute findings in the chest      Electronically signed by: Dipak Multani MD  Date:    11/30/2023  Time:    07:19               Narrative:    EXAMINATION:  XR CHEST 1 VIEW    CLINICAL HISTORY:  pre op;    COMPARISON:  08/10/2023    FINDINGS:  Single view of the chest shows no focal consolidation, pneumothorax or pleural effusion.  Cardiac silhouette and pulmonary vasculature are normal.  Cardiac device and spinal hardware are stable in position.                                       X-Ray Knee Complete 4 Or More Views Right (Final  result)  Result time 11/29/23 12:57:49      Final result by Lanette Waller MD (11/29/23 12:57:49)                   Impression:      1. No acute bony abnormality.  2. Soft tissue swelling.      Electronically signed by: Lanette Waller  Date:    11/29/2023  Time:    12:57               Narrative:    EXAMINATION:  XR KNEE COMP 4 OR MORE VIEWS RIGHT    CLINICAL HISTORY:  Pain in right knee    COMPARISON:  X-rays dated 09/21/2023    FINDINGS:  There is stable alignment with prior internal fixation of the distal femur and proximal tibia.  There is no acute fracture identified.  There are tricompartmental osteophytes with joint space narrowing in the medial compartment.  There is soft tissue swelling of the knee anteriorly.                                      ASSESSMENT:  Neurogenic bladder s/t paraplegia with chronic SP tube   Osteomyelitis s/p I&D and removal of hardware with ortho    PLAN:  -SPT exchanged  -Continue exchanges [id number].   -Please call as needed with any urologic issues.       Kristin Quinn NP
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
## 2023-12-01 NOTE — PROGRESS NOTES
[facility] Hospital Medicine Progress Note        Chief Complaint: Inpatient Follow-up for     HPI:   58 yr old male whose history includes HTN, DM, Afib (on Xarelto), paraplegia secondary to spinal cord injury, neurogenic bladder with chronic suprapubic catheter. Presented to ED with right knee pain with purulent drainage which apparently started this morning. He was treated for right patellar bursa abscess and S/P I&D 6/29/23. Cultures at that time grew streptococcus agalactiae. He's on chronic suppressive therapy, Doxycycline.      Vital signs on arrival: T 97.9, P 83, R 20, B/P 96/56, sats 98% on room air. Initial labs include WBC 17.28, Hgb 11.1, Hct 36.6, BUN 12.6, creatinine 1.29, ESR 65. X-ray right knees showed soft tissue swelling with no acute osseous findings. Meds given in ED include Zosyn and Vancomycin.  Orthopedic surgery was consulted for right septic knee.  Per chart review patient has a chronic infection of the right knee.  Patient has been offered above-the-knee amputation several times by Orthopedic surgery which he has declined.    Interval Hx:   Patient seen and examined by bedside.  Continues to have some drainage on the right knee.  Has lower extremity paralysis.  No other acute concerns at this time.    Case was discussed with patient's nurse and  on the floor.    Objective/physical exam:  General: Appears comfortable  HEENT: NC/AT  Neck:  No JVD, trach site healed  Chest: CTABL  CVS: Regular rhythm. Normal S1/S2.  Abdomen: nondistended, normoactive BS, soft and non-tender, suprapubic cath site without redness or drainage  MSK: No obvious deformity or joint swelling  Skin: Warm and dry, ulcer to left heel and right ankle  Neuro: AAOx3  Psych: Cooperative    VITAL SIGNS: 24 HRS MIN & MAX LAST   Temp  Min: 98 °F (36.7 °C)  Max: 98.6 °F (37 °C) 98.6 °F (37 °C)   BP  Min: 107/59  Max: 164/91 139/77   Pulse  Min: 66  Max: 86  82   Resp  Min: 11  Max: 19  18   SpO2  Min: 97 %  Max: 100 % 99 %     I have reviewed the following labs:  Recent Labs   Lab 11/29/23  1257 11/30/23  0424   WBC 17.28* 13.12*   RBC 4.44* 3.95*   HGB 11.1* 9.9*   HCT 36.8* 31.2*   MCV 82.9 79.0*   MCH 25.0* 25.1*   MCHC 30.2* 31.7*   RDW 19.5* 18.0*    357   MPV 9.0 9.6     Recent Labs   Lab 11/29/23  1257 11/29/23  1600 11/30/23  0424   *  --  137   K 4.7  --  4.2   CO2 16*  --  24   BUN 12.6  --  9.6   CREATININE 1.29*  --  0.77   CALCIUM 8.7  --  8.4   PH  --  7.390  --    ALBUMIN 2.7*  --  2.3*   ALKPHOS 78  --  63   ALT 13  --  11   AST 22  --  16   BILITOT 0.4  --  0.4     Microbiology Results (last 7 days)       Procedure Component Value Units Date/Time    Wound Culture [[id number]] Collected: 11/30/23 1431    Order Status: Sent Specimen: Abscess from Knee, Right Updated: 11/30/23 1443    Anaerobic Culture [[id number]] Collected: 11/30/23 1431    Order Status: Sent Specimen: Abscess from Knee, Right Updated: 11/30/23 1443    Gram Stain [[id number]] Collected: 11/30/23 1431    Order Status: Sent Specimen: Abscess from Knee, Right Updated: 11/30/23 1443    Fungal Culture [[id number]] Collected: 11/30/23 1431    Order Status: Sent Specimen: Abscess from Knee, Right Updated: 11/30/23 1443    Aerobic culture [[id number]] Collected: 11/30/23 1431    Order Status: Canceled Specimen: Abscess from Knee, Right Updated: 11/30/23 1443    Blood Culture [[id number]]  (Normal) Collected: 11/29/23 1257    Order Status: Completed Specimen: Blood Updated: 11/30/23 1401     CULTURE, BLOOD ([facility]) No Growth At 24 Hours    Blood Culture [[id number]]  (Normal) Collected: 11/29/23 1257    Order Status: Completed Specimen: Blood Updated: 11/30/23 1401     CULTURE, BLOOD ([facility]) No Growth At 24 Hours    Wound Culture [[id number]]  (Abnormal) Collected: 11/29/23 1536    Order Status: Completed Specimen: Abscess from Knee, Right Updated: 11/30/23 1104     Wound Culture Moderate Streptococcus agalactiae  (Group B)             See below for Radiology    Scheduled Med:   atorvastatin  20 mg Oral Nightly    carvediloL  12.5 mg Oral BID WM    ceFAZolin (ANCEF) IVPB  2 g Intravenous Q8H    enoxaparin  40 mg Subcutaneous Daily    FLUoxetine  20 mg Oral Daily    methocarbamoL  750 mg Oral TID    oxybutynin  5 mg Oral BID    piperacillin-tazobactam (Zosyn) IV (PEDS and ADULTS) (extended infusion is not appropriate)  4.5 g Intravenous Q8H    polyethylene glycol  17 g Oral Daily    vancomycin (VANCOCIN) IV (PEDS and ADULTS)  1,000 mg Intravenous Q12H      Continuous Infusions:     PRN Meds:  dextrose 10%, dextrose 10%, glucagon (human recombinant), glucose, glucose, HYDROcodone-acetaminophen, HYDROcodone-acetaminophen, insulin aspart U-100, morphine, NON FORMULARY MEDICATION 30 mg, ondansetron, vancomycin - pharmacy to dose, zolpidem     Assessment/Plan:  Chronic right knee infection/septic arthritis  Status post I&D   Patient has been offered above-the-knee amputation several times which she has declined   Plan for further explanation of his right knee with planning for amputation versus further washout  - Morphine PRN pain  - F/u on blood cultures and wound cultures  - Continue Vancomycin and Zosyn     Ulcers right ankle and left heel, chronic, POA  - wound care consult     Atrial fibrillation   - EKG shows SR  - last dose Xarelto 11/28     Hypertension  - Coreg resumed - will hold others for now. B/P on lower limits of normal     CKD stage 2 - stable  - avoid nephrotoxic meds  - will d/c celebrex (home med)    Cocaine abuse, UDS positive for cocaine     PMH: Paraplegia secondary to spinal cord injury, neurogenic bladder with chronic suprapubic catheter, chronic Hepatitis C, Dyslipidemia    VTE prophylaxis:     Patient condition:  Stable/Fair/Guarded/ Serious/ Critical    Anticipated discharge and Disposition:         All diagnosis and differential diagnosis have been reviewed; assessment and plan has been documented; I  have personally reviewed the labs and test results that are presently available; I have reviewed the patients medication list; I have reviewed the consulting providers response and recommendations. I have reviewed or attempted to review medical records based upon their availability    All of the patient's questions have been  addressed and answered. Patient's is agreeable to the above stated plan. I will continue to monitor closely and make adjustments to medical management as needed.  _____________________________________________________________________    Nutrition Status:    Radiology:  I have personally reviewed the following imaging and agree with the radiologist.     X-Ray Femur 2 View Right  Narrative: EXAMINATION:  XR FEMUR 2 VIEW RIGHT    CLINICAL HISTORY:  post op;, .    FINDINGS:.  There is demineralization of the visualized osseous structures.  There are degenerative changes of the hip joint with some sclerosis marginal osteophytosis and narrowing of the inferior medial and superolateral aspect of the hip joint articular spaces otherwise preserved with smooth articular surfaces    There are postsurgical changes of ORIF at the distal femur with lateral plate and screws.  There are postsurgical changes of ORIF at the tibia with antegrade intramedullary raven hardware appears intact.  No acute fracture seen.  There are old, healed fracture deformities. There is chronic remodeling at the distal femur with heterogeneous appearance of the marrow and cortex distally.  There are areas of lucency as well as sclerosis.  Changes are similar to what was seen on the previous examination dating back to June 28, 2023.  Impression: Degenerative changes    Significant deformity of the distal femur related to remote trauma.    Internal fixation with intact hardware    Electronically signed by: Rob Arauz  Date:    11/30/2023  Time:    15:17  X-Ray Chest 1 View  Narrative: EXAMINATION:  XR CHEST 1 VIEW    CLINICAL  HISTORY:  pre op;    COMPARISON:  08/10/2023    FINDINGS:  Single view of the chest shows no focal consolidation, pneumothorax or pleural effusion.  Cardiac silhouette and pulmonary vasculature are normal.  Cardiac device and spinal hardware are stable in position.  Impression: No acute findings in the chest    Electronically signed by: Dipak Multani MD  Date:    11/30/2023  Time:    07:19      Eli Reddy DO  Department of Hospital Medicine  [facility]  11/30/2023
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
## 2023-12-01 NOTE — PROGRESS NOTES
Patient is postop day 1 from an I&D of the right knee.  Showed gross purulence throughout the knee joint and hardware.  Also retained foreign bodies from sponge changes.  Plan is to repeat I and D today due to the gross amount of purulence.  Muscle plan to remove the hardware and possibly treat the osteomyelitis with a debridement with Stimulan antibiotic bead placement.    I have discussed with the patient multiple occasions above the amputation would be my recommendation due to lower extremity paralysis and the unlikely chance that we would be able to fight this infection definitively with surgery.  He would like to continue with operative debridements at this time.  He is adamantly denying above-the-knee amputation.  I do believe the infection is dangerous systemically and could lead to systemic complications.  We will continue to treat this infection aggressively.    I explained that surgery and the nature of their condition are not without risks. These include, but are not limited to, bleeding, infection, neurovascular compromise, malunion, nonunion, hardware complications, wound complications, scarring, cosmetic defects, need for later and/or repeated surgeries, avascular necrosis, bone death due to initial trauma, pain, loss of ROM, loss of function, PTOA, deformity, stance/gait and/or functional abnormalities, thromboembolic complications, compartment syndrome, loss of limb, loss of life, anesthetic complications, and other imponderables. I explained that these can occur despite the adequacy of treatments rendered, and that their risks are heightened given the nature of their condition. They verbalized understanding. They would like to continue with surgery at this time. If appropriate family was involved with surgical discussion.     This note/OR report was created with the assistance of  voice recognition software or phone  dictation.  There may be transcription errors as a result of using this technology  however minimal. Effort has been made to assure accuracy of transcription but any obvious errors or omissions should be clarified with the author of the document.       Prabhu Shen,   Orthopedic Trauma Surgery

## 2023-12-01 NOTE — ANESTHESIA PROCEDURE NOTES
Intubation    Date/Time: 12/1/2023 10:52 AM    Performed by: Josué Nguyen CRNA  Authorized by: Polina Aguirre MD    Intubation:     Induction:  Intravenous    Intubated:  Postinduction    Mask Ventilation:  Easy with oral airway    Attempts:  1    Attempted By:  Student    Method of Intubation:  Direct    Blade:  Rice 2    Laryngeal View Grade: Grade I - full view of cords      Difficult Airway Encountered?: No      Complications:  None    Airway Device:  Oral endotracheal tube    Airway Device Size:  7.5    Style/Cuff Inflation:  Cuffed (inflated to minimal occlusive pressure)    Tube secured:  22    Secured at:  The lips    Placement Verified By:  Capnometry    Complicating Factors:  None    Findings Post-Intubation:  Atraumatic/condition of teeth unchanged and BS equal bilateral

## 2023-12-01 NOTE — CONSULTS
Infectious Diseases Consultation       Inpatient consult to Infectious Diseases  Consult performed by: Mitch Butler MD  Consult ordered by: Ada Alexandra PA-C        HPI:   58-year-old male with past medical history of T-spine cord injury with paraplegia, diabetes, HTN, hepatitis-C, chronic right ankle wound/osteomyelitis, was on suppressive doxycycline, known to my service, seen by us initially at [facility] and has followed with us at the office for chronic osteomyelitis, most recently seen during his prolonged hospital admission of 06/28/2023, this same facility, [facility] when he had presented with complaints of pain and swelling to his right knee, apparently struck his knee.  He did also report prior remote right knee surgery.  He was evaluated and noted to have no fevers initially but a temperature of 100.2° today 6/29, no leukocytosis but with thrombocytosis of up to 531 today.  .2 and ESR >130.  CT scan of the right knee at the time noted a 5 cm area of subcutaneous fluid collection in the prepatellar region.  There was aspiration with findings of purulent fluid and cultures showing group B Streptococcus.  He was seen by the orthopedic surgery team with findings of right prepatellar bursa abscess and had incision and drainage of right knee septic arthritis and right prepatellar bursa abscess on 6/29 with cultures yielding group B Streptococcus.  He was treated with a prolonged course of ceftriaxone.  That hospital stay was complicated with acute kidney injury and acute hypoxic respiratory failure with pulmonary edema, pleural effusions and pneumonitis requiring prolonged ICU management on ventilator with tracheostomy and PEG tube placement on 08/06.  He was subsequently stabilized and discharged to LTAC on 08/09 and then discharged home from LTAC on 10/03.  He is admitted this time on 11/29/2023 presenting through the ED with complaints of right  knee purulent drainage.  He was evaluated and noted to have no fevers but with leukocytosis of 17.28, ESR 65, .1, abnormal renal function with creatinine of 1.29, anemic with low albumin.  Urine toxicology test positive for cocaine, benzodiazepines and opiates.  Blood cultures have been negative.  Right knee abscess culture from 11/29 showing moderate Streptococcus agalactiae.  X-ray of the right knee showed soft tissue swelling with no acute abnormality.  X-ray of the femur showed internal fixation with intact hardware and significant deformity of distal femur from remote trauma.  He was seen by ortho team of Dr. Shen, taken to surgery today 11/30 for right knee arthrotomy and removal of foreign body due to infection with cultures pending.  He is currently on antibiotic coverage with vancomycin and Zosyn.    Past Medical and Surgical History  Allergies :   Baclofen    Medical :   He has a past medical history of Arthritis, Chronic ulcer of ankle (05/26/2022), Frequent UTI (07/02/2019), Generalized anxiety disorder (05/26/2022), Neurogenic bladder (05/26/2022), Osteomyelitis (05/26/2022), Presence of suprapubic catheter (05/26/2022), Pure hypercholesterolemia (05/26/2022), Retention of urine, unspecified (08/09/2019), and Spinal cord injury at T1-T6 level (04/20/2018).    Surgical :   He has a past surgical history that includes Insertion of intramedullary raven (Right, 08/10/2022); incision and drainage, lower extremity (Right, 06/29/2023); Fracture surgery (2021); Spine surgery (March 1st 2018); and Joint replacement (2021).     Family History  His family history includes No Known Problems in his father and mother.    Social History  He reports that he has been smoking cigars and cigarettes. He started smoking about 41 years ago. He has a 10.4 pack-year smoking history. He has never used smokeless tobacco. He reports that he does not currently use alcohol after a past usage of about 2.0 standard drinks of  "alcohol per week. He reports that he does not currently use drugs after having used the following drugs: Oxycodone.     Review of Systems   Constitutional:  Positive for malaise/fatigue.   HENT: Negative.     Respiratory: Negative.     Gastrointestinal: Negative.    Genitourinary: Negative.    Musculoskeletal: Negative.         Right knee chronic infection with draining wound   Neurological:  Positive for focal weakness and weakness.   Endo/Heme/Allergies: Negative.    Psychiatric/Behavioral: Negative.     All other Systems review done and negative.    Objective   Physical Exam  Vitals reviewed.   Constitutional:       General: He is not in acute distress.  HENT:      Head: Normocephalic and atraumatic.      Mouth/Throat:      Comments: Edentulous  Eyes:      Pupils: Pupils are equal, round, and reactive to light.   Cardiovascular:      Rate and Rhythm: Normal rate and regular rhythm.      Heart sounds: Normal heart sounds.   Pulmonary:      Effort: Pulmonary effort is normal. No respiratory distress.      Breath sounds: Normal breath sounds.   Abdominal:      General: Bowel sounds are normal. There is no distension.      Palpations: Abdomen is soft.      Tenderness: There is no abdominal tenderness.   Genitourinary:     Comments: Stover catheter in place  Musculoskeletal:         General: No deformity.      Cervical back: Neck supple.      Comments: Contracted lower extremities   Skin:     Findings: No erythema or rash.      Comments: Right knee with wound VAC   Neurological:      Mental Status: He is alert and oriented to person, place, and time.   Psychiatric:      Comments: Calm and cooperative       VITAL SIGNS: 24 HR MIN & MAX LAST    Temp  Min: 98 °F (36.7 °C)  Max: 98.6 °F (37 °C)  98 °F (36.7 °C)        BP  Min: 131/80  Max: 165/68  131/80     Pulse  Min: 70  Max: 95  95     Resp  Min: 11  Max: 19  18    SpO2  Min: 97 %  Max: 100 %  100 %      HT: 5' 9" (175.3 cm)  WT: 72.6 kg (160 lb)  BMI: 23.6     Recent " Results (from the past 24 hour(s))   POCT glucose    Collection Time: 11/29/23  8:35 PM   Result Value Ref Range    POCT Glucose 169 (H) 70 - 110 mg/dL   Drug Screen, Urine    Collection Time: 11/30/23  2:42 AM   Result Value Ref Range    Amphetamines, Urine Negative Negative    Barbituates, Urine Negative Negative    Benzodiazepine, Urine Positive (A) Negative    Cannabinoids, Urine Negative Negative    Cocaine, Urine Positive (A) Negative    Fentanyl, Urine Negative Negative    MDMA, Urine Negative Negative    Opiates, Urine Positive (A) Negative    Phencyclidine, Urine Negative Negative    pH, Urine 6.0 3.0 - 11.0   Comprehensive Metabolic Panel    Collection Time: 11/30/23  4:24 AM   Result Value Ref Range    Sodium Level 137 136 - 145 mmol/L    Potassium Level 4.2 3.5 - 5.1 mmol/L    Chloride 106 98 - 107 mmol/L    Carbon Dioxide 24 22 - 29 mmol/L    Glucose Level 90 74 - 100 mg/dL    Blood Urea Nitrogen 9.6 8.4 - 25.7 mg/dL    Creatinine 0.77 0.73 - 1.18 mg/dL    Calcium Level Total 8.4 8.4 - 10.2 mg/dL    Protein Total 5.9 (L) 6.4 - 8.3 gm/dL    Albumin Level 2.3 (L) 3.5 - 5.0 g/dL    Globulin 3.6 (H) 2.4 - 3.5 gm/dL    Albumin/Globulin Ratio 0.6 (L) 1.1 - 2.0 ratio    Bilirubin Total 0.4 <=1.5 mg/dL    Alkaline Phosphatase 63 40 - 150 unit/L    Alanine Aminotransferase 11 0 - 55 unit/L    Aspartate Aminotransferase 16 5 - 34 unit/L    eGFR >60 mls/min/1.73/m2   CBC with Differential    Collection Time: 11/30/23  4:24 AM   Result Value Ref Range    WBC 13.12 (H) 4.50 - 11.50 x10(3)/mcL    RBC 3.95 (L) 4.70 - 6.10 x10(6)/mcL    Hgb 9.9 (L) 14.0 - 18.0 g/dL    Hct 31.2 (L) 42.0 - 52.0 %    MCV 79.0 (L) 80.0 - 94.0 fL    MCH 25.1 (L) 27.0 - 31.0 pg    MCHC 31.7 (L) 33.0 - 36.0 g/dL    RDW 18.0 (H) 11.5 - 17.0 %    Platelet 357 130 - 400 x10(3)/mcL    MPV 9.6 7.4 - 10.4 fL    Neut % 69.3 %    Lymph % 20.7 %    Mono % 8.8 %    Eos % 0.7 %    Basophil % 0.1 %    Lymph # 2.72 0.6 - 4.6 x10(3)/mcL    Neut # 9.10 2.1  - 9.2 x10(3)/mcL    Mono # 1.15 0.1 - 1.3 x10(3)/mcL    Eos # 0.09 0 - 0.9 x10(3)/mcL    Baso # 0.01 <=0.2 x10(3)/mcL    IG# 0.05 (H) 0 - 0.04 x10(3)/mcL    IG% 0.4 %    NRBC% 0.0 %   Type & Screen    Collection Time: 11/30/23  5:47 AM   Result Value Ref Range    Group & Rh O POS     Indirect Elizabeth GEL NEG     Specimen Outdate 12/03/2023 23:59    POCT glucose    Collection Time: 11/30/23  7:42 AM   Result Value Ref Range    POCT Glucose 81 70 - 110 mg/dL   POCT glucose    Collection Time: 11/30/23  1:00 PM   Result Value Ref Range    POCT Glucose 90 70 - 110 mg/dL   POCT glucose    Collection Time: 11/30/23  3:29 PM   Result Value Ref Range    POCT Glucose 133 (H) 70 - 110 mg/dL       Imaging  Imaging Results              X-Ray Chest 1 View (Final result)  Result time 11/30/23 07:19:57      Final result by Dipak Multani MD (11/30/23 07:19:57)                   Impression:      No acute findings in the chest      Electronically signed by: Dipak Multani MD  Date:    11/30/2023  Time:    07:19               Narrative:    EXAMINATION:  XR CHEST 1 VIEW    CLINICAL HISTORY:  pre op;    COMPARISON:  08/10/2023    FINDINGS:  Single view of the chest shows no focal consolidation, pneumothorax or pleural effusion.  Cardiac silhouette and pulmonary vasculature are normal.  Cardiac device and spinal hardware are stable in position.                                       X-Ray Knee Complete 4 Or More Views Right (Final result)  Result time 11/29/23 12:57:49      Final result by Lanette Waller MD (11/29/23 12:57:49)                   Impression:      1. No acute bony abnormality.  2. Soft tissue swelling.      Electronically signed by: Lanette Waller  Date:    11/29/2023  Time:    12:57               Narrative:    EXAMINATION:  XR KNEE COMP 4 OR MORE VIEWS RIGHT    CLINICAL HISTORY:  Pain in right knee    COMPARISON:  X-rays dated 09/21/2023    FINDINGS:  There is stable alignment with prior internal fixation of the  distal femur and proximal tibia.  There is no acute fracture identified.  There are tricompartmental osteophytes with joint space narrowing in the medial compartment.  There is soft tissue swelling of the knee anteriorly.                                       Impression  1. Sepsis with leukocytosis  2.  Group B Streptococcus chronic right knee infection with septic arthritis and osteomyelitis of the femur  3. Acute kidney injury/chronic kidney disease  4.  Elevated ESR, CRP  5.  Cocaine / tobacco use  6.  Diabetes type 2  7.  Anemia   8.  Protein calorie malnutrition  9.  Paraplegia     Recommendations  I agree with the management of this patient.  Paraplegic with history of chronic right knee infection with osteomyelitis of the femur, retained infected hardware, with cultures previously indicative of group B Streptococcus and on this admission again with group B Streptococcus isolate, consistent with already known diagnosis of persistent infection.  It is obvious that he has failed attempts to continue to manage the infectious process conservatively with chronic antibiotic maintenance.  The adherence to chronic antibiotic regimen may be an issue.  I have discussed this at length with him with emphasis on my agreement on recommendations for an above-knee amputation per Orthopedic surgery team, as most likely opportunity to eradicate infectious process.  I have also elaborated on risk of sepsis from an uncontrolled source of infection.  Questions were solicited and answered to disregard with patient verbalizing understanding.  He did however remained adamant on declining an above-knee amputation and per him the decision is based on his feeling.  He has elected to go through limited surgical intervention supported with long-term antibiotic course.  He stated that he will be open to considering the amputation if this fails to keep the infection in check.  We will proceed according to patient's wishes and we will  optimize antibiotic coverage with placement on high-dose IV ampicillin, plan for a 6 week course, following inflammatory markers with subsequent indefinite oral ampicillin.  Plan to return to OR for hardware removal tomorrow by Orthopedic surgery team noted.  We will continue surgical wound care per ortho. Renal impairment noted with improved creatinine.  We will follow with labs in a.m..  Guarded long-term prognosis.  Case is discussed at length with patient and with nursing staff.  I would like to thank the team very much for the opportunity to assist in the care this patient.

## 2023-12-01 NOTE — TRANSFER OF CARE
"Anesthesia Transfer of Care Note    Patient: Bianca Khan    Procedure(s) Performed: Procedure(s) (LRB):  INCISION AND DRAINAGE, LOWER EXTREMITY (Right)  REMOVAL, HARDWARE, LOWER EXTREMITY (Right)    Patient location: PACU    Anesthesia Type: general    Transport from OR: Transported from OR on room air with adequate spontaneous ventilation    Post pain: adequate analgesia    Post assessment: no apparent anesthetic complications and tolerated procedure well    Post vital signs: stable    Level of consciousness: sedated    Nausea/Vomiting: no nausea/vomiting    Complications: none    Transfer of care protocol was followed      Last vitals: Visit Vitals  BP (!) 160/81 (BP Location: Right arm, Patient Position: Sitting)   Pulse 74   Temp 36.4 °C (97.6 °F) (Oral)   Resp 18   Ht 5' 9" (1.753 m)   Wt 72.6 kg (160 lb)   SpO2 98%   BMI 23.63 kg/m²     "

## 2023-12-01 NOTE — OP NOTE
OPERATIVE REPORT      Patient: Bianca Khan   : 1965    MRN: [id number]  Date: 2023      Surgeon:Prabhu Shen DO  Assistant: Grant Alexandra was essential, part of the procedure including deep hardware placement and deep closure.  No senior assistant was availible  Preoperative Diagnosis:  Right femur osteomyelitis-chronic, right femur infected hardware -chronic, bilateral lower extremity paralysis chronic  Postoperative Diagnosis: Same  Procedure:  Incision and drainage right femur osteomyelitis with open bone window 81890  Removal of hardware deep right femur  Implant of antibiotic spacer Stimulan beads   Anesthesiologist: Pierre Dennis MD  OR Staff: Circulator: Savanna Nazario RN  Physician Assistant: Ada Alexandra PA-C  Radiology Technologist: Haim Perez RT  Relief Circulator: Jennifer Prajapati RN  Relief Scrub: Kiran Love ST  Scrub Person: Yaya Gannon  Implants: * No implants in log *  EBL: 100cc  Complications: None  Disposition: To PACU, stable    Indications: Bianca Khan is a 58 y.o. male presenting with the aforementioned injuries/findings. The procedure is indicated to decrease risk of infection..  The patient is awake and alert. After thorough discussion of the risks, benefits, expected outcomes, and alternatives to surgical intervention, the patient agreed to proceed with surgical treatment.  Specific risks discussed included, but were not limited to: superficial or deep infection, wound healing complications, DVT/PE, significant bleeding requiring transfusion, damage to named anatomic structures in the immediate area including named neurovascular structures, infection, nonunion, malunion and general risks of anesthesia.  The patient voiced understanding and written as well as verbal consent was obtained by myself prior to the procedure.    Please refer to previous op notes.  Patient has lower extremity paralysis.  Patient has continued chronic  infection of the right knee resistant to above-the-knee amputation.  Any surgical measures below amputation will unlikely be successful in eradicating infection.  I am concerned that the infection may spread systemically and we will continue to salvage the right lower extremity    Procedure Note:  The patient was brought back to the OR and placed supine on the OR table. After successful induction of anesthesia by anesthesia staff, the patient was positioned in the supine position and all bony prominences were padded appropriately.  The surgical field was then provisionally cleansed and then prepped and draped in the usual sterile fashion.    At this time a time-out was performed, with the correct patient, site, and procedure identified.  The universal time out as well as sign your site protocols were followed.  Preoperative antibiotics were verified as administered.     Attention is drawn to the right knee.  I extended the previous made incision proximally along the plate.  We then removed the hardware without complication.  Patient had multiple areas concerning for osteomyelitis and soft bone cortex.  Patient has obvious signs of infection proximally and distally.  I then placed a guidewire through the intercondylar notch of the femur using [employer] surgical technique guide.  We then placed an opening Reamer followed by [facility] from [facility] to debride the intramedullary canal.    Then created a Stimulan antibiotic eluting beads which placed in the intramedullary canal with gentamicin and vancomycin.    The incision(s) was/were then irrigated using copious sterile saline and then vancomyocin was added to the wound bed for prophylaxis. The surgical wound was closed in layered fashion.  The surgical site(s) was/were were sterilely cleansed and dressed.    The patient was then subsequently transferred to to PACU in a stable condition.    All sponge and needle counts were correct at the end of the case.  I was present and  participated in all aspects of the procedure.    Prognosis:  The patient will be kept NWB on the ipsilateral extremity  .  Patient will receive DVT prophylaxis .   Patient has a high risk of repeat infection due to chronic osteomyelitis.  All hardware has been removed.  Gentamicin vancomycin Stimulan beads were placed in the intramedullary canal.  These will not require removal.  If infection returns likely patient's only option is above-the-knee amputation.      This note/OR report was created with the assistance of  voice recognition software or phone  dictation.  There may be transcription errors as a result of using this technology however minimal. Effort has been made to assure accuracy of transcription but any obvious errors or omissions should be clarified with the author of the document.       Prabhu Shen, DO  Orthopedic Trauma Surgery

## 2023-12-02 LAB
BASOPHILS # BLD AUTO: 0.01 X10(3)/MCL
BASOPHILS NFR BLD AUTO: 0.1 %
CRP SERPL-MCNC: 152.8 MG/L
EOSINOPHIL # BLD AUTO: 0 X10(3)/MCL (ref 0–0.9)
EOSINOPHIL NFR BLD AUTO: 0 %
ERYTHROCYTE [DISTWIDTH] IN BLOOD BY AUTOMATED COUNT: 18 % (ref 11.5–17)
ERYTHROCYTE [SEDIMENTATION RATE] IN BLOOD: >130 MM/HR (ref 0–15)
HCT VFR BLD AUTO: 28.7 % (ref 42–52)
HGB BLD-MCNC: 8.7 G/DL (ref 14–18)
IMM GRANULOCYTES # BLD AUTO: 0.04 X10(3)/MCL (ref 0–0.04)
IMM GRANULOCYTES NFR BLD AUTO: 0.4 %
LYMPHOCYTES # BLD AUTO: 1.94 X10(3)/MCL (ref 0.6–4.6)
LYMPHOCYTES NFR BLD AUTO: 17.7 %
MCH RBC QN AUTO: 24.6 PG (ref 27–31)
MCHC RBC AUTO-ENTMCNC: 30.3 G/DL (ref 33–36)
MCV RBC AUTO: 81.1 FL (ref 80–94)
MONOCYTES # BLD AUTO: 0.81 X10(3)/MCL (ref 0.1–1.3)
MONOCYTES NFR BLD AUTO: 7.4 %
NEUTROPHILS # BLD AUTO: 8.13 X10(3)/MCL (ref 2.1–9.2)
NEUTROPHILS NFR BLD AUTO: 74.4 %
NRBC BLD AUTO-RTO: 0 %
PLATELET # BLD AUTO: 413 X10(3)/MCL (ref 130–400)
PMV BLD AUTO: 9.5 FL (ref 7.4–10.4)
POCT GLUCOSE: 107 MG/DL (ref 70–110)
POCT GLUCOSE: 126 MG/DL (ref 70–110)
POCT GLUCOSE: 178 MG/DL (ref 70–110)
RBC # BLD AUTO: 3.54 X10(6)/MCL (ref 4.7–6.1)
WBC # SPEC AUTO: 10.93 X10(3)/MCL (ref 4.5–11.5)

## 2023-12-02 PROCEDURE — 85652 RBC SED RATE AUTOMATED: CPT | Performed by: INTERNAL MEDICINE

## 2023-12-02 PROCEDURE — 63600175 PHARM REV CODE 636 W HCPCS: Performed by: NURSE PRACTITIONER

## 2023-12-02 PROCEDURE — 86140 C-REACTIVE PROTEIN: CPT | Performed by: INTERNAL MEDICINE

## 2023-12-02 PROCEDURE — 63600175 PHARM REV CODE 636 W HCPCS: Performed by: PHYSICIAN ASSISTANT

## 2023-12-02 PROCEDURE — 25000003 PHARM REV CODE 250: Performed by: INTERNAL MEDICINE

## 2023-12-02 PROCEDURE — 21400001 HC TELEMETRY ROOM

## 2023-12-02 PROCEDURE — 25000003 PHARM REV CODE 250: Performed by: NURSE PRACTITIONER

## 2023-12-02 PROCEDURE — 85025 COMPLETE CBC W/AUTO DIFF WBC: CPT | Performed by: PHYSICIAN ASSISTANT

## 2023-12-02 PROCEDURE — 25000003 PHARM REV CODE 250: Performed by: PHYSICIAN ASSISTANT

## 2023-12-02 PROCEDURE — S4991 NICOTINE PATCH NONLEGEND: HCPCS | Performed by: NURSE PRACTITIONER

## 2023-12-02 RX ORDER — TEMAZEPAM 30 MG/1
30 CAPSULE ORAL NIGHTLY PRN
Status: DISCONTINUED | OUTPATIENT
Start: 2023-12-02 | End: 2023-12-08 | Stop reason: HOSPADM

## 2023-12-02 RX ORDER — AMLODIPINE BESYLATE 5 MG/1
5 TABLET ORAL NIGHTLY
Status: DISCONTINUED | OUTPATIENT
Start: 2023-12-02 | End: 2023-12-03

## 2023-12-02 RX ORDER — GUAIFENESIN 600 MG/1
600 TABLET, EXTENDED RELEASE ORAL 2 TIMES DAILY
Status: DISCONTINUED | OUTPATIENT
Start: 2023-12-03 | End: 2023-12-08 | Stop reason: HOSPADM

## 2023-12-02 RX ORDER — OXYCODONE AND ACETAMINOPHEN 10; 325 MG/1; MG/1
1 TABLET ORAL EVERY 4 HOURS PRN
Status: DISCONTINUED | OUTPATIENT
Start: 2023-12-02 | End: 2023-12-08 | Stop reason: HOSPADM

## 2023-12-02 RX ADMIN — FLUOXETINE 20 MG: 20 CAPSULE ORAL at 08:12

## 2023-12-02 RX ADMIN — METHOCARBAMOL 750 MG: 750 TABLET ORAL at 08:12

## 2023-12-02 RX ADMIN — OXYBUTYNIN CHLORIDE 5 MG: 5 TABLET ORAL at 08:12

## 2023-12-02 RX ADMIN — OXYCODONE AND ACETAMINOPHEN 1 TABLET: 10; 325 TABLET ORAL at 06:12

## 2023-12-02 RX ADMIN — AMPICILLIN SODIUM 2 G: 2 INJECTION, POWDER, FOR SOLUTION INTRAMUSCULAR; INTRAVENOUS at 11:12

## 2023-12-02 RX ADMIN — INSULIN ASPART 2 UNITS: 100 INJECTION, SOLUTION INTRAVENOUS; SUBCUTANEOUS at 12:12

## 2023-12-02 RX ADMIN — METHOCARBAMOL 750 MG: 750 TABLET ORAL at 09:12

## 2023-12-02 RX ADMIN — OXYCODONE AND ACETAMINOPHEN 1 TABLET: 10; 325 TABLET ORAL at 04:12

## 2023-12-02 RX ADMIN — AMPICILLIN SODIUM 2 G: 2 INJECTION, POWDER, FOR SOLUTION INTRAMUSCULAR; INTRAVENOUS at 09:12

## 2023-12-02 RX ADMIN — TEMAZEPAM 30 MG: 30 CAPSULE ORAL at 09:12

## 2023-12-02 RX ADMIN — AMPICILLIN SODIUM 2 G: 2 INJECTION, POWDER, FOR SOLUTION INTRAMUSCULAR; INTRAVENOUS at 12:12

## 2023-12-02 RX ADMIN — AMPICILLIN SODIUM 2 G: 2 INJECTION, POWDER, FOR SOLUTION INTRAMUSCULAR; INTRAVENOUS at 03:12

## 2023-12-02 RX ADMIN — MORPHINE SULFATE 3 MG: 4 INJECTION, SOLUTION INTRAMUSCULAR; INTRAVENOUS at 06:12

## 2023-12-02 RX ADMIN — AMPICILLIN SODIUM 2 G: 2 INJECTION, POWDER, FOR SOLUTION INTRAMUSCULAR; INTRAVENOUS at 08:12

## 2023-12-02 RX ADMIN — OXYCODONE AND ACETAMINOPHEN 1 TABLET: 10; 325 TABLET ORAL at 11:12

## 2023-12-02 RX ADMIN — CARVEDILOL 12.5 MG: 12.5 TABLET, FILM COATED ORAL at 04:12

## 2023-12-02 RX ADMIN — METHOCARBAMOL 750 MG: 750 TABLET ORAL at 02:12

## 2023-12-02 RX ADMIN — AMLODIPINE BESYLATE 5 MG: 5 TABLET ORAL at 09:12

## 2023-12-02 RX ADMIN — MORPHINE SULFATE 3 MG: 4 INJECTION, SOLUTION INTRAMUSCULAR; INTRAVENOUS at 03:12

## 2023-12-02 RX ADMIN — ENOXAPARIN SODIUM 40 MG: 40 INJECTION SUBCUTANEOUS at 04:12

## 2023-12-02 RX ADMIN — MORPHINE SULFATE 3 MG: 4 INJECTION, SOLUTION INTRAMUSCULAR; INTRAVENOUS at 10:12

## 2023-12-02 RX ADMIN — OXYBUTYNIN CHLORIDE 5 MG: 5 TABLET ORAL at 09:12

## 2023-12-02 RX ADMIN — ATORVASTATIN CALCIUM 20 MG: 10 TABLET, FILM COATED ORAL at 09:12

## 2023-12-02 RX ADMIN — MORPHINE SULFATE 3 MG: 4 INJECTION, SOLUTION INTRAMUSCULAR; INTRAVENOUS at 01:12

## 2023-12-02 RX ADMIN — OXYCODONE AND ACETAMINOPHEN 1 TABLET: 10; 325 TABLET ORAL at 09:12

## 2023-12-02 RX ADMIN — AMPICILLIN SODIUM 2 G: 2 INJECTION, POWDER, FOR SOLUTION INTRAMUSCULAR; INTRAVENOUS at 04:12

## 2023-12-02 RX ADMIN — CARVEDILOL 12.5 MG: 12.5 TABLET, FILM COATED ORAL at 08:12

## 2023-12-02 RX ADMIN — NICOTINE 1 PATCH: 21 PATCH, EXTENDED RELEASE TRANSDERMAL at 08:12

## 2023-12-02 NOTE — PROGRESS NOTES
Infectious Diseases Progress Note  58-year-old male with past medical history of T-spine cord injury with paraplegia, diabetes, HTN, hepatitis-C, chronic right ankle wound/osteomyelitis, was on suppressive doxycycline, known to my service, seen by us initially at [facility] and has followed with us at the office for chronic osteomyelitis, most recently seen during his prolonged hospital admission of 06/28/2023, this same facility, [facility] when he had presented with complaints of pain and swelling to his right knee, apparently struck his knee.  He did also report prior remote right knee surgery.  He was evaluated and noted to have no fevers initially but a temperature of 100.2° today 6/29, no leukocytosis but with thrombocytosis of up to 531 today.  .2 and ESR >130.  CT scan of the right knee at the time noted a 5 cm area of subcutaneous fluid collection in the prepatellar region.  There was aspiration with findings of purulent fluid and cultures showing group B Streptococcus.  He was seen by the orthopedic surgery team with findings of right prepatellar bursa abscess and had incision and drainage of right knee septic arthritis and right prepatellar bursa abscess on 6/29 with cultures yielding group B Streptococcus.  He was treated with a prolonged course of ceftriaxone.  That hospital stay was complicated with acute kidney injury and acute hypoxic respiratory failure with pulmonary edema, pleural effusions and pneumonitis requiring prolonged ICU management on ventilator with tracheostomy and PEG tube placement on 08/06.  He was subsequently stabilized and discharged to LTAC on 08/09 and then discharged home from LTAC on 10/03.  He is admitted this time on 11/29/2023 presenting through the ED with complaints of right knee purulent drainage.  He was evaluated and noted to have no fevers but with leukocytosis of 17.28, ESR 65, .1, abnormal renal function with  creatinine of 1.29, anemic with low albumin.  Urine toxicology test positive for cocaine, benzodiazepines and opiates.  Blood cultures have been negative.  Right knee abscess culture from 11/29 showing moderate Streptococcus agalactiae.  X-ray of the right knee showed soft tissue swelling with no acute abnormality.  X-ray of the femur showed internal fixation with intact hardware and significant deformity of distal femur from remote trauma.  He was seen by ortho team of Dr. Shen, taken to surgery today 11/30 for right knee arthrotomy and removal of foreign body due to infection with cultures pending.  He is currently on antibiotic coverage with Ampicillin     Subjective:  Lying in bed in no acute distress. No new complaints voiced. Afebrile     ROS  Constitutional:  Positive for malaise/fatigue.   HENT: Negative.     Respiratory: Negative.     Gastrointestinal: Negative.    Genitourinary: Negative.    Musculoskeletal: Negative.         Right knee chronic infection with draining wound   Neurological:  Positive for focal weakness and weakness.   Endo/Heme/Allergies: Negative.    Psychiatric/Behavioral: Negative.     All other Systems review done and negative.    Review of patient's allergies indicates:   Allergen Reactions    Baclofen Itching and Anxiety       Past Medical History:   Diagnosis Date    Arthritis     Chronic ulcer of ankle 05/26/2022    Frequent UTI 07/02/2019    Generalized anxiety disorder 05/26/2022    Neurogenic bladder 05/26/2022    Osteomyelitis 05/26/2022    Presence of suprapubic catheter 05/26/2022    Pure hypercholesterolemia 05/26/2022    Retention of urine, unspecified 08/09/2019    Spinal cord injury at T1-T6 level 04/20/2018       Past Surgical History:   Procedure Laterality Date    FRACTURE SURGERY  2021    INCISION AND DRAINAGE, LOWER EXTREMITY Right 06/29/2023    Procedure: INCISION AND DRAINAGE, LOWER EXTREMITY;  Surgeon: Prabhu Shen DO;  Location: [facility];  Service: Orthopedics;   Laterality: Right;  supine bone foam wash stuff cultures    INCISION AND DRAINAGE, LOWER EXTREMITY Right 2023    Procedure: INCISION AND DRAINAGE, LOWER EXTREMITY;  Surgeon: Prabhu Shen DO;  Location: [facility] OR;  Service: Orthopedics;  Laterality: Right;  supine any table bone foam wash stuff possible wound vac    INSERTION OF INTRAMEDULLARY MARISA Right 08/10/2022    Procedure: INSERTION, INTRAMEDULLARY MARISA RIGHT TIBIA;  Surgeon: Jorge Orellana MD;  Location: [facility] OR;  Service: Orthopedics;  Laterality: Right;    JOINT REPLACEMENT      Ankel    SPINE SURGERY  2018       Social History     Socioeconomic History    Marital status:    Tobacco Use    Smoking status: Some Days     Current packs/day: 0.00     Average packs/day: 0.2 packs/day for 41.5 years (10.4 ttl pk-yrs)     Types: Cigars, Cigarettes     Start date: 1982     Last attempt to quit: 2023     Years since quittin.3    Smokeless tobacco: Never   Substance and Sexual Activity    Alcohol use: Not Currently     Alcohol/week: 2.0 standard drinks of alcohol     Types: 2 Cans of beer per week    Drug use: Not Currently     Types: Oxycodone    Sexual activity: Not Currently     Partners: Female     Birth control/protection: None     Social Determinants of Health     Financial Resource Strain: Low Risk  (2023)    Overall Financial Resource Strain (CARDIA)     Difficulty of Paying Living Expenses: Not very hard   Food Insecurity: No Food Insecurity (2023)    Hunger Vital Sign     Worried About Running Out of Food in the Last Year: Never true     Ran Out of Food in the Last Year: Never true   Transportation Needs: No Transportation Needs (2023)    PRAPARE - Transportation     Lack of Transportation (Medical): No     Lack of Transportation (Non-Medical): No   Physical Activity: Inactive (2023)    Exercise Vital Sign     Days of Exercise per Week: 0 days     Minutes of Exercise per Session: 0 min   Stress: No  "Stress Concern Present (8/9/2023)    [employer] [place] [employer] - Occupational Stress Questionnaire     Feeling of Stress : Only a little   Social Connections: Moderately Isolated (10/3/2023)    Social Connection and Isolation Panel [NHANES]     Frequency of Communication with Friends and Family: Twice a week     Frequency of Social Gatherings with Friends and Family: Twice a week     Attends Nondenominational Services: Never     Active Member of Clubs or Organizations: No     Attends Club or Organization Meetings: Never     Marital Status:    Housing Stability: Low Risk  (8/9/2023)    Housing Stability Vital Sign     Unable to Pay for Housing in the Last Year: No     Number of Places Lived in the Last Year: 1     Unstable Housing in the Last Year: No         Scheduled Meds:   ampicillin IVPB  2 g Intravenous Q4H    atorvastatin  20 mg Oral Nightly    carvediloL  12.5 mg Oral BID WM    enoxaparin  40 mg Subcutaneous Daily    FLUoxetine  20 mg Oral Daily    methocarbamoL  750 mg Oral TID    nicotine  1 patch Transdermal Daily    oxybutynin  5 mg Oral BID    polyethylene glycol  17 g Oral Daily     Continuous Infusions:  PRN Meds:dextrose 10%, dextrose 10%, glucagon (human recombinant), glucose, glucose, insulin aspart U-100, morphine, ondansetron, oxyCODONE-acetaminophen, oxyCODONE-acetaminophen, temazepam, zolpidem    Objective:  BP (!) 151/85   Pulse 79   Temp 97.5 °F (36.4 °C) (Oral)   Resp 16   Ht 5' 9" (1.753 m)   Wt 72.6 kg (160 lb)   SpO2 99%   BMI 23.63 kg/m²     Physical Exam:   Physical Exam  Vitals reviewed.   Constitutional:       General: He is not in acute distress.  HENT:      Head: Normocephalic and atraumatic.      Mouth/Throat:      Comments: Edentulous  Eyes:      Pupils: Pupils are equal, round, and reactive to light.   Cardiovascular:      Rate and Rhythm: Normal rate and regular rhythm.      Heart sounds: Normal heart sounds.   Pulmonary:      Effort: Pulmonary effort is " normal. No respiratory distress.      Breath sounds: Normal breath sounds.   Abdominal:      General: Bowel sounds are normal. There is no distension.      Palpations: Abdomen is soft.      Tenderness: There is no abdominal tenderness.   Genitourinary:     Comments: Stover catheter in place  Musculoskeletal:         General: No deformity.      Cervical back: Neck supple.      Comments: Contracted lower extremities   Skin:     Findings: No erythema or rash.      Comments: Right knee dressed from surgery   Neurological:      Mental Status: He is alert and oriented to person, place, and time.   Psychiatric:      Comments: Calm and cooperative      Imaging    Lab Review   Recent Results (from the past 24 hour(s))   POCT glucose    Collection Time: 12/01/23  4:35 PM   Result Value Ref Range    POCT Glucose 139 (H) 70 - 110 mg/dL   POCT glucose    Collection Time: 12/01/23  8:48 PM   Result Value Ref Range    POCT Glucose 190 (H) 70 - 110 mg/dL   C-Reactive Protein    Collection Time: 12/02/23  6:48 AM   Result Value Ref Range    C-Reactive Protein 152.80 (H) <5.00 mg/L   Sedimentation rate    Collection Time: 12/02/23  6:48 AM   Result Value Ref Range    Sed Rate >130 (H) 0 - 15 mm/hr   CBC with Differential    Collection Time: 12/02/23  6:48 AM   Result Value Ref Range    WBC 10.93 4.50 - 11.50 x10(3)/mcL    RBC 3.54 (L) 4.70 - 6.10 x10(6)/mcL    Hgb 8.7 (L) 14.0 - 18.0 g/dL    Hct 28.7 (L) 42.0 - 52.0 %    MCV 81.1 80.0 - 94.0 fL    MCH 24.6 (L) 27.0 - 31.0 pg    MCHC 30.3 (L) 33.0 - 36.0 g/dL    RDW 18.0 (H) 11.5 - 17.0 %    Platelet 413 (H) 130 - 400 x10(3)/mcL    MPV 9.5 7.4 - 10.4 fL    Neut % 74.4 %    Lymph % 17.7 %    Mono % 7.4 %    Eos % 0.0 %    Basophil % 0.1 %    Lymph # 1.94 0.6 - 4.6 x10(3)/mcL    Neut # 8.13 2.1 - 9.2 x10(3)/mcL    Mono # 0.81 0.1 - 1.3 x10(3)/mcL    Eos # 0.00 0 - 0.9 x10(3)/mcL    Baso # 0.01 <=0.2 x10(3)/mcL    IG# 0.04 0 - 0.04 x10(3)/mcL    IG% 0.4 %    NRBC% 0.0 %   POCT glucose     Collection Time: 12/02/23 11:29 AM   Result Value Ref Range    POCT Glucose 178 (H) 70 - 110 mg/dL       Assessment/Plan:  1.  Sepsis with leukocytosis  2.  Group B Streptococcus chronic right knee infection with septic arthritis and osteomyelitis of the femur  3. Acute kidney injury/chronic kidney disease  4.  Elevated ESR, CRP  5.  Cocaine / tobacco use  6.  Diabetes type 2  7.  Anemia   8.  Protein calorie malnutrition  9.  Paraplegia      -Continue Ampicillin #3. Plan a 6 week course following inflammatory markers  -Will need chronic oral suppressive antibiotic therapy with ampicillin upon completion of IV antibiotics  -Afebrile without leukocytosis, follow  -12/2 ESR > 130 from 65 and .8 from 211.10  -Ortho on board, inputs noted  -S/P R knee arthrotomy with removal of foreign body on 11/30  -S/P I&D R femur with removal of hardware and placement of antibiotic spacer  -R knee abscess culture from 11/29 revealed moderate Streptococcus agalactiae  -Discussed with patient and nursing

## 2023-12-02 NOTE — PROGRESS NOTES
[facility] - 9th [facility] Med Surg  Orthopedics  Progress Note    Patient Name: Bianca Khan  MRN: [id number]  Admission Date: 11/29/2023  Hospital Length of Stay: 3 days  Attending Provider: Jaclyn Meier MD  Primary Care Provider: Areli Vargas PA-C  Follow-up For: Procedure(s) (LRB):  INCISION AND DRAINAGE, LOWER EXTREMITY (Right)  REMOVAL, HARDWARE, LOWER EXTREMITY (Right)    Post-Operative Day: 1 Day Post-Op  Subjective:     Principal Problem:Chronic infection of right knee    Principal Orthopedic Problem:  Right femur hardware removal pod 1    Interval History:  Patient resting in bed comfortably.  States he is overall doing well but having some spasms; taking muscle relaxer.  Patient also states he is normally on Percocet 10q4 rather than q6h.  Receiving antibiotics.  Patient does have pre-existing bilateral lower extremity paralysis.    Review of patient's allergies indicates:   Allergen Reactions    Baclofen Itching and Anxiety       Current Facility-Administered Medications   Medication    ampicillin (OMNIPEN) 2 g in sodium chloride 0.9 % 100 mL IVPB (MB+)    atorvastatin tablet 20 mg    carvediloL tablet 12.5 mg    dextrose 10% bolus 125 mL 125 mL    dextrose 10% bolus 250 mL 250 mL    enoxaparin injection 40 mg    FLUoxetine capsule 20 mg    glucagon (human recombinant) injection 1 mg    glucose chewable tablet 16 g    glucose chewable tablet 24 g    insulin aspart U-100 injection 1-10 Units    methocarbamoL tablet 750 mg    morphine injection 3 mg    nicotine 21 mg/24 hr 1 patch    NON FORMULARY MEDICATION 30 mg    ondansetron injection 4 mg    oxybutynin tablet 5 mg    oxyCODONE-acetaminophen  mg per tablet 1 tablet    oxyCODONE-acetaminophen 5-325 mg per tablet 1 tablet    polyethylene glycol packet 17 g    zolpidem tablet 5 mg     Objective:     Vital Signs (Most Recent):  Temp: 97.6 °F (36.4 °C) (12/02/23 0441)  Pulse: 70 (12/02/23 0441)  Resp: 18 (12/02/23 0604)  BP: (!)  "145/75 (12/02/23 0441)  SpO2: 96 % (12/02/23 0441) Vital Signs (24h Range):  Temp:  [97.5 °F (36.4 °C)-98.4 °F (36.9 °C)] 97.6 °F (36.4 °C)  Pulse:  [61-87] 70  Resp:  [16-20] 18  SpO2:  [96 %-100 %] 96 %  BP: (116-160)/() 145/75     Weight: 72.6 kg (160 lb)  Height: 5' 9" (175.3 cm)  Body mass index is 23.63 kg/m².      Intake/Output Summary (Last 24 hours) at 12/2/2023 0731  Last data filed at 12/2/2023 0501  Gross per 24 hour   Intake 900 ml   Output 2075 ml   Net -1175 ml       Ortho/SPM Exam  MSK:  Right lower extremity compartments are soft and warm.  Skin is intact.  There are no signs or symptoms of a DVT.  Dressing in place is clean, dry and intact.  Patient is able to feel pressure with palpation.  Decreased sensation to touch secondary to chronic paralysis.  Brisk capillary refill.    Significant Labs: CBC:   Recent Labs   Lab 12/01/23  0615   WBC 7.28   HGB 10.6*   HCT 34.1*            All pertinent labs within the past 24 hours have been reviewed.    Significant Imaging: I have reviewed all pertinent imaging results/findings.    Assessment/Plan:     Active Diagnoses:    Diagnosis Date Noted POA    PRINCIPAL PROBLEM:  Chronic infection of right knee [M00.9] 11/30/2023 Yes    Pain and swelling of knee, right [M25.561, M25.461] 11/30/2023 Unknown      Problems Resolved During this Admission:     -patient is nonambulatory and will receive Lovenox for DVT prophylaxis.  -dressing changes starting on 12/03/2023.  -continue IV antibiotics.  ID on board.  Appreciate recommendations.  -we will defer pain medication management to attending.  Would recommend discontinuing IV morphine and changing Percocet from q6h to q4h        Veronica Odonnell PA-C  Orthopedics  [facility] - 9th Floor Med Surg  "

## 2023-12-02 NOTE — PROGRESS NOTES
Infectious Diseases Progress Note  58-year-old male with past medical history of T-spine cord injury with paraplegia, diabetes, HTN, hepatitis-C, chronic right ankle wound/osteomyelitis, was on suppressive doxycycline, known to my service, seen by us initially at [facility] and has followed with us at the office for chronic osteomyelitis, most recently seen during his prolonged hospital admission of 06/28/2023, this same facility, [facility] when he had presented with complaints of pain and swelling to his right knee, apparently struck his knee.  He did also report prior remote right knee surgery.  He was evaluated and noted to have no fevers initially but a temperature of 100.2° today 6/29, no leukocytosis but with thrombocytosis of up to 531 today.  .2 and ESR >130.  CT scan of the right knee at the time noted a 5 cm area of subcutaneous fluid collection in the prepatellar region.  There was aspiration with findings of purulent fluid and cultures showing group B Streptococcus.  He was seen by the orthopedic surgery team with findings of right prepatellar bursa abscess and had incision and drainage of right knee septic arthritis and right prepatellar bursa abscess on 6/29 with cultures yielding group B Streptococcus.  He was treated with a prolonged course of ceftriaxone.  That hospital stay was complicated with acute kidney injury and acute hypoxic respiratory failure with pulmonary edema, pleural effusions and pneumonitis requiring prolonged ICU management on ventilator with tracheostomy and PEG tube placement on 08/06.  He was subsequently stabilized and discharged to LTAC on 08/09 and then discharged home from LTAC on 10/03.  He is admitted this time on 11/29/2023 presenting through the ED with complaints of right knee purulent drainage.  He was evaluated and noted to have no fevers but with leukocytosis of 17.28, ESR 65, .1, abnormal renal function with  creatinine of 1.29, anemic with low albumin.  Urine toxicology test positive for cocaine, benzodiazepines and opiates.  Blood cultures have been negative.  Right knee abscess culture from 11/29 showing moderate Streptococcus agalactiae.  X-ray of the right knee showed soft tissue swelling with no acute abnormality.  X-ray of the femur showed internal fixation with intact hardware and significant deformity of distal femur from remote trauma.  He was seen by ortho team of Dr. Shen, taken to surgery today 11/30 for right knee arthrotomy and removal of foreign body due to infection with cultures pending.  He is currently on antibiotic coverage with Ampicillin    Subjective:  Lying in bed in no acute distress. No new complaints voiced. Afebrile. Significant other present    ROS  Constitutional:  Positive for malaise/fatigue.   HENT: Negative.     Respiratory: Negative.     Gastrointestinal: Negative.    Genitourinary: Negative.    Musculoskeletal: Negative.         Right knee chronic infection with draining wound   Neurological:  Positive for focal weakness and weakness.   Endo/Heme/Allergies: Negative.    Psychiatric/Behavioral: Negative.     All other Systems review done and negative.       Review of patient's allergies indicates:   Allergen Reactions    Baclofen Itching and Anxiety       Past Medical History:   Diagnosis Date    Arthritis     Chronic ulcer of ankle 05/26/2022    Frequent UTI 07/02/2019    Generalized anxiety disorder 05/26/2022    Neurogenic bladder 05/26/2022    Osteomyelitis 05/26/2022    Presence of suprapubic catheter 05/26/2022    Pure hypercholesterolemia 05/26/2022    Retention of urine, unspecified 08/09/2019    Spinal cord injury at T1-T6 level 04/20/2018       Past Surgical History:   Procedure Laterality Date    FRACTURE SURGERY  2021    INCISION AND DRAINAGE, LOWER EXTREMITY Right 06/29/2023    Procedure: INCISION AND DRAINAGE, LOWER EXTREMITY;  Surgeon: Prabhu Shen DO;  Location: [facility]  OR;  Service: Orthopedics;  Laterality: Right;  supine bone foam wash stuff cultures    INCISION AND DRAINAGE, LOWER EXTREMITY Right 2023    Procedure: INCISION AND DRAINAGE, LOWER EXTREMITY;  Surgeon: Prabhu Shen DO;  Location: [facility] OR;  Service: Orthopedics;  Laterality: Right;  supine any table bone foam wash stuff possible wound vac    INSERTION OF INTRAMEDULLARY MARISA Right 08/10/2022    Procedure: INSERTION, INTRAMEDULLARY MARISA RIGHT TIBIA;  Surgeon: Jorge Orellana MD;  Location: [facility] OR;  Service: Orthopedics;  Laterality: Right;    JOINT REPLACEMENT      Ankel    SPINE SURGERY  2018       Social History     Socioeconomic History    Marital status:    Tobacco Use    Smoking status: Some Days     Current packs/day: 0.00     Average packs/day: 0.2 packs/day for 41.5 years (10.4 ttl pk-yrs)     Types: Cigars, Cigarettes     Start date: 1982     Last attempt to quit: 2023     Years since quittin.3    Smokeless tobacco: Never   Substance and Sexual Activity    Alcohol use: Not Currently     Alcohol/week: 2.0 standard drinks of alcohol     Types: 2 Cans of beer per week    Drug use: Not Currently     Types: Oxycodone    Sexual activity: Not Currently     Partners: Female     Birth control/protection: None     Social Determinants of Health     Financial Resource Strain: Low Risk  (2023)    Overall Financial Resource Strain (CARDIA)     Difficulty of Paying Living Expenses: Not very hard   Food Insecurity: No Food Insecurity (2023)    Hunger Vital Sign     Worried About Running Out of Food in the Last Year: Never true     Ran Out of Food in the Last Year: Never true   Transportation Needs: No Transportation Needs (2023)    PRAPARE - Transportation     Lack of Transportation (Medical): No     Lack of Transportation (Non-Medical): No   Physical Activity: Inactive (2023)    Exercise Vital Sign     Days of Exercise per Week: 0 days     Minutes of Exercise per  "Session: 0 min   Stress: No Stress Concern Present (8/9/2023)    [place] [employer] - Occupational Stress Questionnaire     Feeling of Stress : Only a little   Social Connections: Moderately Isolated (10/3/2023)    Social Connection and Isolation Panel [NHANES]     Frequency of Communication with Friends and Family: Twice a week     Frequency of Social Gatherings with Friends and Family: Twice a week     Attends Zoroastrian Services: Never     Active Member of Clubs or Organizations: No     Attends Club or Organization Meetings: Never     Marital Status:    Housing Stability: Low Risk  (8/9/2023)    Housing Stability Vital Sign     Unable to Pay for Housing in the Last Year: No     Number of Places Lived in the Last Year: 1     Unstable Housing in the Last Year: No         Scheduled Meds:   ampicillin  2,000 mg Oral Q6H    atorvastatin  20 mg Oral Nightly    carvediloL  12.5 mg Oral BID WM    enoxaparin  40 mg Subcutaneous Daily    fentaNYL        FLUoxetine  20 mg Oral Daily    methocarbamoL  750 mg Oral TID    nicotine  1 patch Transdermal Daily    oxybutynin  5 mg Oral BID    polyethylene glycol  17 g Oral Daily     Continuous Infusions:  PRN Meds:dextrose 10%, dextrose 10%, fentaNYL, glucagon (human recombinant), glucose, glucose, HYDROcodone-acetaminophen, HYDROcodone-acetaminophen, insulin aspart U-100, morphine, NON FORMULARY MEDICATION 30 mg, ondansetron, zolpidem    Objective:  /79   Pulse 87   Temp 97.7 °F (36.5 °C) (Oral)   Resp 16   Ht 5' 9" (1.753 m)   Wt 72.6 kg (160 lb)   SpO2 96%   BMI 23.63 kg/m²     Physical Exam:   Physical Exam  Vitals reviewed.   Constitutional:       General: He is not in acute distress.  HENT:      Head: Normocephalic and atraumatic.      Mouth/Throat:      Comments: Edentulous  Eyes:      Pupils: Pupils are equal, round, and reactive to light.   Cardiovascular:      Rate and Rhythm: Normal rate and regular rhythm.      Heart sounds: " Normal heart sounds.   Pulmonary:      Effort: Pulmonary effort is normal. No respiratory distress.      Breath sounds: Normal breath sounds.   Abdominal:      General: Bowel sounds are normal. There is no distension.      Palpations: Abdomen is soft.      Tenderness: There is no abdominal tenderness.   Genitourinary:     Comments: Stover catheter in place  Musculoskeletal:         General: No deformity.      Cervical back: Neck supple.      Comments: Contracted lower extremities   Skin:     Findings: No erythema or rash.      Comments: Right knee dressed from surgery   Neurological:      Mental Status: He is alert and oriented to person, place, and time.   Psychiatric:      Comments: Calm and cooperative     Imaging      Lab Review   Recent Results (from the past 24 hour(s))   POCT glucose    Collection Time: 12/01/23  6:02 AM   Result Value Ref Range    POCT Glucose 110 70 - 110 mg/dL   CBC with Differential    Collection Time: 12/01/23  6:15 AM   Result Value Ref Range    WBC 7.28 4.50 - 11.50 x10(3)/mcL    RBC 4.36 (L) 4.70 - 6.10 x10(6)/mcL    Hgb 10.6 (L) 14.0 - 18.0 g/dL    Hct 34.1 (L) 42.0 - 52.0 %    MCV 78.2 (L) 80.0 - 94.0 fL    MCH 24.3 (L) 27.0 - 31.0 pg    MCHC 31.1 (L) 33.0 - 36.0 g/dL    RDW 17.9 (H) 11.5 - 17.0 %    Platelet 289 130 - 400 x10(3)/mcL    MPV 10.2 7.4 - 10.4 fL    Neut % 52.8 %    Lymph % 37.8 %    Mono % 8.2 %    Eos % 0.8 %    Basophil % 0.1 %    Lymph # 2.75 0.6 - 4.6 x10(3)/mcL    Neut # 3.84 2.1 - 9.2 x10(3)/mcL    Mono # 0.60 0.1 - 1.3 x10(3)/mcL    Eos # 0.06 0 - 0.9 x10(3)/mcL    Baso # 0.01 <=0.2 x10(3)/mcL    IG# 0.02 0 - 0.04 x10(3)/mcL    IG% 0.3 %    NRBC% 0.0 %    IPF 3.7 0.9 - 11.2 %   POCT glucose    Collection Time: 12/01/23  4:35 PM   Result Value Ref Range    POCT Glucose 139 (H) 70 - 110 mg/dL       Assessment/Plan:  1.  Sepsis with leukocytosis  2.  Group B Streptococcus chronic right knee infection with septic arthritis and osteomyelitis of the femur  3. Acute  kidney injury/chronic kidney disease  4.  Elevated ESR, CRP  5.  Cocaine / tobacco use  6.  Diabetes type 2  7.  Anemia   8.  Protein calorie malnutrition  9.  Paraplegia     -Continue Ampicillin #2. Plan a 6 week course following inflammatory markers  -Will need chronic oral suppressive antibiotic therapy with ampicillin upon completion of IV antibiotics  -Afebrile with leukocytosis resolved, follow  -11/29 ESR 65 and .10  -Ortho on board, inputs noted  -S/P R knee arthrotomy with removal of foreign body on 11/30  -S/P I&D R femur with removal of hardware and placement of antibiotic spacer  -R knee abscess culture from 11/29 revealed moderate Streptococcus agalactiae  -Discussed with patient, significant other and nursing

## 2023-12-03 LAB
ANION GAP SERPL CALC-SCNC: 5 MEQ/L
BACTERIA SPEC ANAEROBE CULT: NORMAL
BACTERIA WND CULT: ABNORMAL
BACTERIA WND CULT: ABNORMAL
BASOPHILS # BLD AUTO: 0.03 X10(3)/MCL
BASOPHILS NFR BLD AUTO: 0.3 %
BUN SERPL-MCNC: 4.6 MG/DL (ref 8.4–25.7)
CALCIUM SERPL-MCNC: 9.2 MG/DL (ref 8.4–10.2)
CHLORIDE SERPL-SCNC: 105 MMOL/L (ref 98–107)
CO2 SERPL-SCNC: 30 MMOL/L (ref 22–29)
CREAT SERPL-MCNC: 0.7 MG/DL (ref 0.73–1.18)
CREAT/UREA NIT SERPL: 7
EOSINOPHIL # BLD AUTO: 0.03 X10(3)/MCL (ref 0–0.9)
EOSINOPHIL NFR BLD AUTO: 0.3 %
ERYTHROCYTE [DISTWIDTH] IN BLOOD BY AUTOMATED COUNT: 17.7 % (ref 11.5–17)
GFR SERPLBLD CREATININE-BSD FMLA CKD-EPI: >60 MLS/MIN/1.73/M2
GLUCOSE SERPL-MCNC: 112 MG/DL (ref 74–100)
HCT VFR BLD AUTO: 27.3 % (ref 42–52)
HGB BLD-MCNC: 8.4 G/DL (ref 14–18)
IMM GRANULOCYTES # BLD AUTO: 0.03 X10(3)/MCL (ref 0–0.04)
IMM GRANULOCYTES NFR BLD AUTO: 0.3 %
LYMPHOCYTES # BLD AUTO: 3.92 X10(3)/MCL (ref 0.6–4.6)
LYMPHOCYTES NFR BLD AUTO: 37.3 %
MAGNESIUM SERPL-MCNC: 1.9 MG/DL (ref 1.6–2.6)
MCH RBC QN AUTO: 24.3 PG (ref 27–31)
MCHC RBC AUTO-ENTMCNC: 30.8 G/DL (ref 33–36)
MCV RBC AUTO: 79.1 FL (ref 80–94)
MONOCYTES # BLD AUTO: 0.92 X10(3)/MCL (ref 0.1–1.3)
MONOCYTES NFR BLD AUTO: 8.8 %
NEUTROPHILS # BLD AUTO: 5.58 X10(3)/MCL (ref 2.1–9.2)
NEUTROPHILS NFR BLD AUTO: 53 %
NRBC BLD AUTO-RTO: 0 %
PHOSPHATE SERPL-MCNC: 3.1 MG/DL (ref 2.3–4.7)
PLATELET # BLD AUTO: 441 X10(3)/MCL (ref 130–400)
PMV BLD AUTO: 9.6 FL (ref 7.4–10.4)
POCT GLUCOSE: 109 MG/DL (ref 70–110)
POCT GLUCOSE: 118 MG/DL (ref 70–110)
POCT GLUCOSE: 122 MG/DL (ref 70–110)
POCT GLUCOSE: 94 MG/DL (ref 70–110)
POTASSIUM SERPL-SCNC: 4.7 MMOL/L (ref 3.5–5.1)
RBC # BLD AUTO: 3.45 X10(6)/MCL (ref 4.7–6.1)
SODIUM SERPL-SCNC: 140 MMOL/L (ref 136–145)
WBC # SPEC AUTO: 10.51 X10(3)/MCL (ref 4.5–11.5)

## 2023-12-03 PROCEDURE — 85025 COMPLETE CBC W/AUTO DIFF WBC: CPT | Performed by: PHYSICIAN ASSISTANT

## 2023-12-03 PROCEDURE — 63600175 PHARM REV CODE 636 W HCPCS: Performed by: PHYSICIAN ASSISTANT

## 2023-12-03 PROCEDURE — 25000003 PHARM REV CODE 250: Performed by: NURSE PRACTITIONER

## 2023-12-03 PROCEDURE — 84100 ASSAY OF PHOSPHORUS: CPT | Performed by: INTERNAL MEDICINE

## 2023-12-03 PROCEDURE — 63600175 PHARM REV CODE 636 W HCPCS: Performed by: NURSE PRACTITIONER

## 2023-12-03 PROCEDURE — 94761 N-INVAS EAR/PLS OXIMETRY MLT: CPT

## 2023-12-03 PROCEDURE — S4991 NICOTINE PATCH NONLEGEND: HCPCS | Performed by: NURSE PRACTITIONER

## 2023-12-03 PROCEDURE — 21400001 HC TELEMETRY ROOM

## 2023-12-03 PROCEDURE — 83735 ASSAY OF MAGNESIUM: CPT | Performed by: INTERNAL MEDICINE

## 2023-12-03 PROCEDURE — 80048 BASIC METABOLIC PNL TOTAL CA: CPT | Performed by: INTERNAL MEDICINE

## 2023-12-03 PROCEDURE — 25000003 PHARM REV CODE 250: Performed by: INTERNAL MEDICINE

## 2023-12-03 RX ORDER — METHOCARBAMOL 750 MG/1
750 TABLET, FILM COATED ORAL EVERY 6 HOURS
Status: DISCONTINUED | OUTPATIENT
Start: 2023-12-03 | End: 2023-12-08 | Stop reason: HOSPADM

## 2023-12-03 RX ORDER — LANOLIN ALCOHOL/MO/W.PET/CERES
1 CREAM (GRAM) TOPICAL 2 TIMES DAILY
Status: DISCONTINUED | OUTPATIENT
Start: 2023-12-03 | End: 2023-12-08 | Stop reason: HOSPADM

## 2023-12-03 RX ORDER — AMLODIPINE BESYLATE 5 MG/1
10 TABLET ORAL DAILY
Status: DISCONTINUED | OUTPATIENT
Start: 2023-12-03 | End: 2023-12-08 | Stop reason: HOSPADM

## 2023-12-03 RX ORDER — DOCUSATE SODIUM 100 MG/1
200 CAPSULE, LIQUID FILLED ORAL 2 TIMES DAILY
Status: DISCONTINUED | OUTPATIENT
Start: 2023-12-03 | End: 2023-12-08 | Stop reason: HOSPADM

## 2023-12-03 RX ADMIN — CARVEDILOL 12.5 MG: 12.5 TABLET, FILM COATED ORAL at 05:12

## 2023-12-03 RX ADMIN — METHOCARBAMOL 750 MG: 750 TABLET ORAL at 11:12

## 2023-12-03 RX ADMIN — AMPICILLIN SODIUM 2 G: 2 INJECTION, POWDER, FOR SOLUTION INTRAMUSCULAR; INTRAVENOUS at 05:12

## 2023-12-03 RX ADMIN — METHOCARBAMOL 750 MG: 750 TABLET ORAL at 05:12

## 2023-12-03 RX ADMIN — GUAIFENESIN 600 MG: 600 TABLET, EXTENDED RELEASE ORAL at 03:12

## 2023-12-03 RX ADMIN — OXYCODONE AND ACETAMINOPHEN 1 TABLET: 10; 325 TABLET ORAL at 01:12

## 2023-12-03 RX ADMIN — AMPICILLIN SODIUM 2 G: 2 INJECTION, POWDER, FOR SOLUTION INTRAMUSCULAR; INTRAVENOUS at 09:12

## 2023-12-03 RX ADMIN — AMLODIPINE BESYLATE 10 MG: 5 TABLET ORAL at 09:12

## 2023-12-03 RX ADMIN — GUAIFENESIN 600 MG: 600 TABLET, EXTENDED RELEASE ORAL at 09:12

## 2023-12-03 RX ADMIN — OXYBUTYNIN CHLORIDE 5 MG: 5 TABLET ORAL at 09:12

## 2023-12-03 RX ADMIN — FERROUS SULFATE TAB 325 MG (65 MG ELEMENTAL FE) 1 EACH: 325 (65 FE) TAB at 09:12

## 2023-12-03 RX ADMIN — ZOLPIDEM TARTRATE 5 MG: 5 TABLET ORAL at 09:12

## 2023-12-03 RX ADMIN — NICOTINE 1 PATCH: 21 PATCH, EXTENDED RELEASE TRANSDERMAL at 09:12

## 2023-12-03 RX ADMIN — METHOCARBAMOL 750 MG: 750 TABLET ORAL at 03:12

## 2023-12-03 RX ADMIN — CARVEDILOL 12.5 MG: 12.5 TABLET, FILM COATED ORAL at 09:12

## 2023-12-03 RX ADMIN — OXYCODONE AND ACETAMINOPHEN 1 TABLET: 10; 325 TABLET ORAL at 09:12

## 2023-12-03 RX ADMIN — FLUOXETINE 20 MG: 20 CAPSULE ORAL at 09:12

## 2023-12-03 RX ADMIN — OXYCODONE AND ACETAMINOPHEN 1 TABLET: 10; 325 TABLET ORAL at 05:12

## 2023-12-03 RX ADMIN — AMPICILLIN SODIUM 2 G: 2 INJECTION, POWDER, FOR SOLUTION INTRAMUSCULAR; INTRAVENOUS at 02:12

## 2023-12-03 RX ADMIN — AMPICILLIN SODIUM 2 G: 2 INJECTION, POWDER, FOR SOLUTION INTRAMUSCULAR; INTRAVENOUS at 01:12

## 2023-12-03 RX ADMIN — ENOXAPARIN SODIUM 40 MG: 40 INJECTION SUBCUTANEOUS at 05:12

## 2023-12-03 RX ADMIN — METHOCARBAMOL 750 MG: 750 TABLET ORAL at 12:12

## 2023-12-03 RX ADMIN — OXYCODONE AND ACETAMINOPHEN 1 TABLET: 10; 325 TABLET ORAL at 02:12

## 2023-12-03 RX ADMIN — ATORVASTATIN CALCIUM 20 MG: 10 TABLET, FILM COATED ORAL at 09:12

## 2023-12-03 NOTE — PROGRESS NOTES
[facility] - 9th Floor Med Surg  Orthopedics  Progress Note    Patient Name: Bianca Khan  MRN: [id number]  Admission Date: 11/29/2023  Hospital Length of Stay: 4 days  Attending Provider: Jaclyn Meier MD  Primary Care Provider: Areli Vargas PA-C  Follow-up For: Procedure(s) (LRB):  INCISION AND DRAINAGE, LOWER EXTREMITY (Right)  REMOVAL, HARDWARE, LOWER EXTREMITY (Right)    Post-Operative Day: 2 Day Post-Op  Subjective:     Principal Problem:Chronic infection of right knee    Principal Orthopedic Problem:  Right femur hardware removal pod 2    Interval History:  Patient resting in bed comfortably.  States he is much improved and doing very well today. Pain well managed.  Receiving antibiotics.  Patient does have pre-existing bilateral lower extremity paralysis. Dressing changed this am.    Review of patient's allergies indicates:   Allergen Reactions    Baclofen Itching and Anxiety       Current Facility-Administered Medications   Medication    amLODIPine tablet 10 mg    ampicillin (OMNIPEN) 2 g in sodium chloride 0.9 % 100 mL IVPB (MB+)    atorvastatin tablet 20 mg    carvediloL tablet 12.5 mg    dextrose 10% bolus 125 mL 125 mL    dextrose 10% bolus 250 mL 250 mL    enoxaparin injection 40 mg    FLUoxetine capsule 20 mg    glucagon (human recombinant) injection 1 mg    glucose chewable tablet 16 g    glucose chewable tablet 24 g    guaiFENesin 12 hr tablet 600 mg    insulin aspart U-100 injection 1-10 Units    methocarbamoL tablet 750 mg    nicotine 21 mg/24 hr 1 patch    ondansetron injection 4 mg    oxybutynin tablet 5 mg    oxyCODONE-acetaminophen  mg per tablet 1 tablet    oxyCODONE-acetaminophen 5-325 mg per tablet 1 tablet    polyethylene glycol packet 17 g    temazepam capsule 30 mg    zolpidem tablet 5 mg     Objective:     Vital Signs (Most Recent):  Temp: 97.9 °F (36.6 °C) (12/03/23 0801)  Pulse: 74 (12/03/23 0801)  Resp: 18 (12/03/23 0938)  BP: (!) 164/94 (12/03/23  "0801)  SpO2: 97 % (12/03/23 0801) Vital Signs (24h Range):  Temp:  [97.5 °F (36.4 °C)-98.2 °F (36.8 °C)] 97.9 °F (36.6 °C)  Pulse:  [62-85] 74  Resp:  [16-18] 18  SpO2:  [95 %-99 %] 97 %  BP: (148-179)/(76-94) 164/94     Weight: 72.6 kg (160 lb)  Height: 5' 9" (175.3 cm)  Body mass index is 23.63 kg/m².      Intake/Output Summary (Last 24 hours) at 12/3/2023 1031  Last data filed at 12/3/2023 0800  Gross per 24 hour   Intake 240 ml   Output 3700 ml   Net -3460 ml       Ortho/SPM Exam  MSK:  Right lower extremity compartments are soft and warm.  Skin is intact.  There are no signs or symptoms of a DVT.  Dressing in place is clean, dry and intact.  Patient is able to feel pressure with palpation.  Decreased sensation to touch secondary to chronic paralysis.  Brisk capillary refill.    Significant Labs: CBC:   Recent Labs   Lab 12/02/23  0648 12/03/23  0601   WBC 10.93 10.51   HGB 8.7* 8.4*   HCT 28.7* 27.3*   * 441*         All pertinent labs within the past 24 hours have been reviewed.    Significant Imaging: I have reviewed all pertinent imaging results/findings.    Assessment/Plan:     Active Diagnoses:    Diagnosis Date Noted POA    PRINCIPAL PROBLEM:  Chronic infection of right knee [M00.9] 11/30/2023 Yes    Pain and swelling of knee, right [M25.561, M25.461] 11/30/2023 Unknown      Problems Resolved During this Admission:     -patient is nonambulatory and will receive Lovenox for DVT prophylaxis.  -dressing changes starting on 12/03/2023.  -continue IV antibiotics.  ID on board.  Appreciate recommendations.  -H/h slightly dropping but stable; defer management to attending.          Veronica Odonnell PA-C  Orthopedics  [facility] - 9th Floor Med Surg  "

## 2023-12-03 NOTE — PROGRESS NOTES
[facility] Hospital Medicine Progress Note        Chief Complaint: Inpatient Follow-up for Rt knee septic arthritis      HPI:   58 yr old male with PMHx of  HTN, DM II,  Afib (on Xarelto), paraplegia secondary to T- spine cord injury( T1-T6), neurogenic bladder with chronic suprapubic catheter, hepatitis-C, chronic right ankle wound/osteomyelitis, presented to the ED with right knee pain with purulent drainage which apparently started this morning. He was treated for right patellar bursa abscess and S/P I&D on 6/29/23. Cultures at that time grew streptococcus agalactiae. He's on chronic suppressive therapy with oral  Doxycycline.      Vital signs on arrival: T 97.9, P 83, R 20, B/P 96/56, sats 98% on room air. Initial labs include WBC 17.28, Hgb 11.1, Hct 36.6, BUN 12.6, creatinine 1.29, ESR 65. X-ray of the right knee showed soft tissue swelling with no acute abnormality.  X-ray of the femur showed internal fixation with intact hardware and significant deformity of distal femur from remote trauma. Meds given in ED include Zosyn and Vancomycin.     Orthopedic surgery was consulted for right septic knee.  Per chart review patient has a chronic infection of the right knee. Patient has been offered above-the-knee amputation several times by Orthopedic surgery which he has declined. Infectious Disease service was consulted as well. Pt is well known to Dr. Hutchison's service. Blood cultures have been negative.  Right knee abscess culture from 11/29 showing similar bacteria moderate growth of Streptococcus agalactiae.        Pt was taken to OR  by ortho  Dr. Shen on 11/30 showed  gross purulence throughout the knee joint and hardware.  Also retained foreign bodies from sponge changes. Pt underwent  Right knee arthrotomy and removal of foreign body. Pt was taken back to OR on 12/1/23   due to the gross amount of purulence and underwent incision and drainage of right distal femur osteomyelitis with  open bone window, removal of hardware deep right femur and implant of antibiotic spacer Stimulan beads. Intraoperative culture from 11/30 grew out E.coli, GBS, gamma non-hemolytic strep. No anaerobes isolated. ID changed antibiotic to IV Ampicillin 2 gram q4h.         Interval Hx:   POD #1 from Rt femus hardware removal   Pt has no new c/o today . Wife at bedside   BP is elevated today. Afebrile     Intraoperative culture from 11/30 grew out E.coli, GBS, gamma non-hemolytic strep. No anaerobes isolated. ID changed antibiotic to IV Ampicillin 2 gram q4h.     Labs showed WBC normalized, Hgb down to 8.7 from 11.1 on admit  Case was discussed with patient's nurse and  on the floor.    Objective/physical exam:  General: In no acute distress, afebrile  Chest: Clear to auscultation bilaterally  Heart: RRR, +S1, S2, no appreciable murmur  Abdomen: Soft, nontender, BS +  MSK: RLE with ACE wrap dressing, ulcer to left heel and right ankle   Neurologic: Alert and oriented x4    VITAL SIGNS: 24 HRS MIN & MAX LAST   Temp  Min: 97.4 °F (36.3 °C)  Max: 98.4 °F (36.9 °C) 98.2 °F (36.8 °C)   BP  Min: 135/77  Max: 160/93 (!) 159/77   Pulse  Min: 61  Max: 85  85   Resp  Min: 16  Max: 18 16   SpO2  Min: 96 %  Max: 99 % 97 %     I have reviewed the following labs:  Recent Labs   Lab 11/30/23  0424 12/01/23  0615 12/02/23  0648   WBC 13.12* 7.28 10.93   RBC 3.95* 4.36* 3.54*   HGB 9.9* 10.6* 8.7*   HCT 31.2* 34.1* 28.7*   MCV 79.0* 78.2* 81.1   MCH 25.1* 24.3* 24.6*   MCHC 31.7* 31.1* 30.3*   RDW 18.0* 17.9* 18.0*    289 413*   MPV 9.6 10.2 9.5     Recent Labs   Lab 11/29/23  1257 11/29/23  1600 11/30/23  0424   *  --  137   K 4.7  --  4.2   CO2 16*  --  24   BUN 12.6  --  9.6   CREATININE 1.29*  --  0.77   CALCIUM 8.7  --  8.4   PH  --  7.390  --    ALBUMIN 2.7*  --  2.3*   ALKPHOS 78  --  63   ALT 13  --  11   AST 22  --  16   BILITOT 0.4  --  0.4     Microbiology Results (last 7 days)       Procedure Component  Value Units Date/Time    Blood Culture [[id number]]  (Normal) Collected: 11/29/23 1257    Order Status: Completed Specimen: Blood Updated: 12/02/23 1500     CULTURE, BLOOD ([facility]) No Growth At 72 Hours    Blood Culture [[id number]]  (Normal) Collected: 11/29/23 1257    Order Status: Completed Specimen: Blood Updated: 12/02/23 1400     CULTURE, BLOOD ([facility]) No Growth At 72 Hours    Wound Culture [[id number]]  (Abnormal) Collected: 11/30/23 1431    Order Status: Completed Specimen: Abscess from Knee, Right Updated: 12/02/23 1033     Wound Culture Few Escherichia coli      Few Gamma (non)-hemolytic Streptococcus      Few Streptococcus agalactiae (Group B)    Wound Culture [[id number]]  (Abnormal) Collected: 11/29/23 1536    Order Status: Completed Specimen: Abscess from Knee, Right Updated: 12/02/23 1026     Wound Culture Moderate Streptococcus agalactiae (Group B)      Moderate Enterococcus faecalis    Anaerobic Culture [[id number]] Collected: 11/30/23 1431    Order Status: Completed Specimen: Abscess from Knee, Right Updated: 12/02/23 0739     Anaerobe Culture No Anaerobes Isolated    Gram Stain [[id number]] Collected: 11/30/23 1431    Order Status: Completed Specimen: Abscess from Knee, Right Updated: 12/01/23 0744     GRAM STAIN Rare WBC observed      No bacteria seen    Fungal Culture [[id number]] Collected: 11/30/23 1431    Order Status: Sent Specimen: Abscess from Knee, Right Updated: 11/30/23 1443    Aerobic culture [[id number]] Collected: 11/30/23 1431    Order Status: Canceled Specimen: Abscess from Knee, Right Updated: 11/30/23 1443             See below for Radiology    Scheduled Med:   ampicillin IVPB  2 g Intravenous Q4H    atorvastatin  20 mg Oral Nightly    carvediloL  12.5 mg Oral BID WM    enoxaparin  40 mg Subcutaneous Daily    FLUoxetine  20 mg Oral Daily    methocarbamoL  750 mg Oral TID    nicotine  1 patch Transdermal Daily    oxybutynin  5 mg Oral BID    polyethylene glycol  17 g Oral Daily       Continuous Infusions:     PRN Meds:  dextrose 10%, dextrose 10%, glucagon (human recombinant), glucose, glucose, insulin aspart U-100, ondansetron, oxyCODONE-acetaminophen, oxyCODONE-acetaminophen, temazepam, zolpidem     Assessment/Plan:  Sepsis due to infected Right knee / Septic Arthritis, chronic Right distal femur osteomyelitis, right femur infected hardware  s/p Right knee arthrotomy and removal of foreign body ( 11/30/23) and  incision and drainage of right distal femur osteomyelitis with open bone window, removal of hardware deep right femur and implant of antibiotic spacer Stimulan beads (12/1/23)  Leukocytosis due to above, POA- Normalized   Acute blood loss anemia , post op- mod  Group B Streptococcus chronic right knee infection with septic arthritis of Rt knee  Acute kidney injury, POA- resolved   Elevated inflammatory markers -ESR, CRP  Substance abuse Cocaine / tobacco use  Chronic Microcytic / Hypochromic anemia   Diabetes type 2  Protein calorie malnutrition  Paraplegia      Hx- Frequent UTI, Gen Anxiety disorder, Neurogenic bladder, Presence of Suprapubic catheter,      Plan-   Pt is  s/p Right knee arthrotomy and removal of foreign body ( 11/30/23) and  incision and drainage of right distal femur osteomyelitis with open bone window, removal of hardware deep right femur and implant of antibiotic spacer Stimulan beads (12/1/23)     Intraoperative culture from 11/30 grew out E.coli, GBS, gamma non-hemolytic strep. No anaerobes isolated. ID changed antibiotic to IV Ampicillin 2 gram q4h.      WBC normalized   A drop in H/H noted post. Will monitor . Transfuse blood for Hgb 7 or under  Multimodal pain control   Continue other supportive treatment   ISS for diabetes  Antihypertensives adjusted for elevated BP      VTE prophylaxis: Lovenox     Patient condition:  Fair    Anticipated discharge and Disposition:     TBD     All diagnosis and differential diagnosis have been reviewed; assessment and plan  has been documented; I have personally reviewed the labs and test results that are presently available; I have reviewed the patients medication list; I have reviewed the consulting providers response and recommendations. I have reviewed or attempted to review medical records based upon their availability    All of the patient's questions have been  addressed and answered. Patient's is agreeable to the above stated plan. I will continue to monitor closely and make adjustments to medical management as needed.  ______________________    Jaclyn Meier MD   12/02/2023

## 2023-12-03 NOTE — PROGRESS NOTES
[facility] Hospital Medicine Progress Note        Chief Complaint: Inpatient Follow-up for Rt knee septic arthritis       HPI:   58 yr old male with PMHx of  HTN, DM II,  Afib (on Xarelto), paraplegia secondary to T- spine cord injury( T1-T6), neurogenic bladder with chronic suprapubic catheter, hepatitis-C, chronic right ankle wound/osteomyelitis, presented to the ED with right knee pain with purulent drainage which apparently started this morning. He was treated for right patellar bursa abscess and S/P I&D on 6/29/23. Cultures at that time grew streptococcus agalactiae. He's on chronic suppressive therapy with oral  Doxycycline.      Vital signs on arrival: T 97.9, P 83, R 20, B/P 96/56, sats 98% on room air. Initial labs include WBC 17.28, Hgb 11.1, Hct 36.6, BUN 12.6, creatinine 1.29, ESR 65. X-ray of the right knee showed soft tissue swelling with no acute abnormality.  X-ray of the femur showed internal fixation with intact hardware and significant deformity of distal femur from remote trauma. Meds given in ED include Zosyn and Vancomycin.     Orthopedic surgery was consulted for right septic knee.  Per chart review patient has a chronic infection of the right knee. Patient has been offered above-the-knee amputation several times by Orthopedic surgery which he has declined. Infectious Disease service was consulted as well. Pt is well known to Dr. Hutchison's service. Blood cultures have been negative.  Right knee abscess culture from 11/29 showing similar bacteria moderate growth of Streptococcus agalactiae.        Pt was taken to OR  by ortho  Dr. Shen on 11/30 showed  gross purulence throughout the knee joint and hardware.  Also retained foreign bodies from sponge changes. Pt underwent  Right knee arthrotomy and removal of foreign body. Pt was taken back to OR on 12/1/23   due to the gross amount of purulence and underwent incision and drainage of right distal femur osteomyelitis with  open bone window, removal of hardware deep right femur and implant of antibiotic spacer Stimulan beads. Intraoperative culture from 11/30 grew out E.coli, GBS, Enterococcus faecalis. No anaerobes isolated. ID changed antibiotic to IV Ampicillin 2 gram q4h.          Interval Hx:   POD #2 from Rt femus hardware removal   C/O pain is not adequately controlled . He prefers to be on IV Morphine    BP remains mod elevated. Meds adjusted.   Labs showed WBC normalized, Hgb down to 8.4>8.7 from 11.1 on admit.   Will monitor H/H and transfuse blood for Hgb 7 or under. Oral iron initiated.      Intraoperative culture from 11/30 grew out E.coli, GBS, gamma non-hemolytic strep. No anaerobes isolated. ID changed antibiotic to IV Ampicillin 2 gram q4h.     Case was discussed with patient's nurse and  on the floor.    Objective/physical exam:  General: In no acute distress, afebrile  Chest: Clear to auscultation bilaterally  Heart: RRR, +S1, S2, no appreciable murmur  Abdomen: Soft, nontender, BS +  MSK: RLE with ACE wrap dressing, ulcer to left heel and right ankle   Neurologic: Alert and oriented x4    VITAL SIGNS: 24 HRS MIN & MAX LAST   Temp  Min: 97.5 °F (36.4 °C)  Max: 98.2 °F (36.8 °C) 98 °F (36.7 °C)   BP  Min: 148/73  Max: 179/79 (!) 148/73   Pulse  Min: 62  Max: 85  68   Resp  Min: 16  Max: 18 16   SpO2  Min: 95 %  Max: 97 % 95 %     I have reviewed the following labs:  Recent Labs   Lab 12/01/23  0615 12/02/23  0648 12/03/23  0601   WBC 7.28 10.93 10.51   RBC 4.36* 3.54* 3.45*   HGB 10.6* 8.7* 8.4*   HCT 34.1* 28.7* 27.3*   MCV 78.2* 81.1 79.1*   MCH 24.3* 24.6* 24.3*   MCHC 31.1* 30.3* 30.8*   RDW 17.9* 18.0* 17.7*    413* 441*   MPV 10.2 9.5 9.6     Recent Labs   Lab 11/29/23  1257 11/29/23  1600 11/30/23  0424 12/03/23  0601   *  --  137 140   K 4.7  --  4.2 4.7   CO2 16*  --  24 30*   BUN 12.6  --  9.6 4.6*   CREATININE 1.29*  --  0.77 0.70*   CALCIUM 8.7  --  8.4 9.2   PH  --  7.390  --   --     MG  --   --   --  1.90   ALBUMIN 2.7*  --  2.3*  --    ALKPHOS 78  --  63  --    ALT 13  --  11  --    AST 22  --  16  --    BILITOT 0.4  --  0.4  --      Microbiology Results (last 7 days)       Procedure Component Value Units Date/Time    Blood Culture [[id number]]  (Normal) Collected: 11/29/23 1257    Order Status: Completed Specimen: Blood Updated: 12/03/23 1500     CULTURE, BLOOD ([facility]) No Growth At 96 Hours    Blood Culture [[id number]]  (Normal) Collected: 11/29/23 1257    Order Status: Completed Specimen: Blood Updated: 12/03/23 1400     CULTURE, BLOOD ([facility]) No Growth At 96 Hours    Wound Culture [[id number]]  (Abnormal)  (Susceptibility) Collected: 11/30/23 1431    Order Status: Completed Specimen: Abscess from Knee, Right Updated: 12/03/23 1012     Wound Culture Few Escherichia coli      Few Enterococcus faecalis      Few Streptococcus agalactiae (Group B)    Wound Culture [[id number]]  (Abnormal)  (Susceptibility) Collected: 11/29/23 1536    Order Status: Completed Specimen: Abscess from Knee, Right Updated: 12/03/23 0808     Wound Culture Moderate Streptococcus agalactiae (Group B)      Moderate Enterococcus faecalis    Anaerobic Culture [[id number]] Collected: 11/30/23 1431    Order Status: Completed Specimen: Abscess from Knee, Right Updated: 12/03/23 0713     Anaerobe Culture No Anaerobes Isolated    Gram Stain [[id number]] Collected: 11/30/23 1431    Order Status: Completed Specimen: Abscess from Knee, Right Updated: 12/01/23 0744     GRAM STAIN Rare WBC observed      No bacteria seen    Fungal Culture [[id number]] Collected: 11/30/23 1431    Order Status: Sent Specimen: Abscess from Knee, Right Updated: 11/30/23 1443    Aerobic culture [[id number]] Collected: 11/30/23 1431    Order Status: Canceled Specimen: Abscess from Knee, Right Updated: 11/30/23 1443             See below for Radiology    Scheduled Med:   amLODIPine  10 mg Oral Daily    ampicillin IVPB  2 g Intravenous Q4H    atorvastatin  20  mg Oral Nightly    carvediloL  12.5 mg Oral BID WM    enoxaparin  40 mg Subcutaneous Daily    FLUoxetine  20 mg Oral Daily    guaiFENesin  600 mg Oral BID    methocarbamoL  750 mg Oral Q6H    nicotine  1 patch Transdermal Daily    oxybutynin  5 mg Oral BID    polyethylene glycol  17 g Oral Daily      Continuous Infusions:     PRN Meds:  dextrose 10%, dextrose 10%, glucagon (human recombinant), glucose, glucose, insulin aspart U-100, ondansetron, oxyCODONE-acetaminophen, oxyCODONE-acetaminophen, temazepam, zolpidem     Assessment/Plan:  Sepsis due to infected Right knee / Septic Arthritis, chronic Right distal femur osteomyelitis, right femur infected hardware  s/p Right knee arthrotomy and removal of foreign body ( 11/30/23) and  incision and drainage of right distal femur osteomyelitis with open bone window, removal of hardware deep right femur and implant of antibiotic spacer Stimulan beads (12/1/23)  Leukocytosis due to above, POA- Normalized   Acute blood loss anemia , post op- mod  Group B Streptococcus chronic right knee infection with septic arthritis of Rt knee  Acute kidney injury, POA- resolved   Elevated inflammatory markers -ESR, CRP  Substance abuse Cocaine / tobacco use  Chronic Microcytic / Hypochromic anemia   Diabetes type 2  Protein calorie malnutrition  Paraplegia      Hx- Frequent UTI, Gen Anxiety disorder, Neurogenic bladder, Presence of Suprapubic catheter,      Plan-   Pt is  s/p Right knee arthrotomy and removal of foreign body ( 11/30/23) and  incision and drainage of right distal femur osteomyelitis with open bone window, removal of hardware deep right femur and implant of antibiotic spacer Stimulan beads (12/1/23)     Intraoperative culture from 11/30 grew out E.coli resistant to Ampicillin,  GBS, and Enterococcus faecalis sensitive to Ampicillin.  No anaerobes isolated.     Will wait for ID to further modify antibiotic if indicated       WBC normalized   A drop in H/H noted post. Will  monitor . Transfuse blood for Hgb 7 or under  Multimodal pain control   Continue other supportive treatment   ISS for diabetes  Antihypertensives adjusted for elevated BP     VTE prophylaxis: Lovenox      Patient condition:  Fair     Anticipated discharge and Disposition:     TBD         All diagnosis and differential diagnosis have been reviewed; assessment and plan has been documented; I have personally reviewed the labs and test results that are presently available; I have reviewed the patients medication list; I have reviewed the consulting providers response and recommendations. I have reviewed or attempted to review medical records based upon their availability    All of the patient's questions have been  addressed and answered. Patient's is agreeable to the above stated plan. I will continue to monitor closely and make adjustments to medical management as needed.

## 2023-12-04 LAB
ABO + RH BLD: NORMAL
ABO + RH BLD: NORMAL
BACTERIA BLD CULT: NORMAL
BACTERIA BLD CULT: NORMAL
BACTERIA WND CULT: ABNORMAL
BASOPHILS # BLD AUTO: 0.03 X10(3)/MCL
BASOPHILS NFR BLD AUTO: 0.4 %
BLD PROD TYP BPU: NORMAL
BLD PROD TYP BPU: NORMAL
BLOOD UNIT EXPIRATION DATE: NORMAL
BLOOD UNIT EXPIRATION DATE: NORMAL
BLOOD UNIT TYPE CODE: 5100
BLOOD UNIT TYPE CODE: 5100
CROSSMATCH INTERPRETATION: NORMAL
CROSSMATCH INTERPRETATION: NORMAL
DISPENSE STATUS: NORMAL
DISPENSE STATUS: NORMAL
EOSINOPHIL # BLD AUTO: 0.1 X10(3)/MCL (ref 0–0.9)
EOSINOPHIL NFR BLD AUTO: 1.3 %
ERYTHROCYTE [DISTWIDTH] IN BLOOD BY AUTOMATED COUNT: 17.3 % (ref 11.5–17)
HCT VFR BLD AUTO: 27.8 % (ref 42–52)
HGB BLD-MCNC: 8.9 G/DL (ref 14–18)
IMM GRANULOCYTES # BLD AUTO: 0.04 X10(3)/MCL (ref 0–0.04)
IMM GRANULOCYTES NFR BLD AUTO: 0.5 %
LYMPHOCYTES # BLD AUTO: 3.33 X10(3)/MCL (ref 0.6–4.6)
LYMPHOCYTES NFR BLD AUTO: 41.7 %
MCH RBC QN AUTO: 24.9 PG (ref 27–31)
MCHC RBC AUTO-ENTMCNC: 32 G/DL (ref 33–36)
MCV RBC AUTO: 77.9 FL (ref 80–94)
MONOCYTES # BLD AUTO: 0.76 X10(3)/MCL (ref 0.1–1.3)
MONOCYTES NFR BLD AUTO: 9.5 %
NEUTROPHILS # BLD AUTO: 3.73 X10(3)/MCL (ref 2.1–9.2)
NEUTROPHILS NFR BLD AUTO: 46.6 %
NRBC BLD AUTO-RTO: 0.3 %
PLATELET # BLD AUTO: 455 X10(3)/MCL (ref 130–400)
PMV BLD AUTO: 9.1 FL (ref 7.4–10.4)
POCT GLUCOSE: 99 MG/DL (ref 70–110)
RBC # BLD AUTO: 3.57 X10(6)/MCL (ref 4.7–6.1)
UNIT NUMBER: NORMAL
UNIT NUMBER: NORMAL
WBC # SPEC AUTO: 7.99 X10(3)/MCL (ref 4.5–11.5)

## 2023-12-04 PROCEDURE — 25000003 PHARM REV CODE 250: Performed by: NURSE PRACTITIONER

## 2023-12-04 PROCEDURE — 63600175 PHARM REV CODE 636 W HCPCS: Performed by: PHYSICIAN ASSISTANT

## 2023-12-04 PROCEDURE — S4991 NICOTINE PATCH NONLEGEND: HCPCS | Performed by: NURSE PRACTITIONER

## 2023-12-04 PROCEDURE — 85025 COMPLETE CBC W/AUTO DIFF WBC: CPT | Performed by: INTERNAL MEDICINE

## 2023-12-04 PROCEDURE — 21400001 HC TELEMETRY ROOM

## 2023-12-04 PROCEDURE — 25000003 PHARM REV CODE 250: Performed by: INTERNAL MEDICINE

## 2023-12-04 PROCEDURE — 63600175 PHARM REV CODE 636 W HCPCS: Performed by: NURSE PRACTITIONER

## 2023-12-04 RX ORDER — DOXYCYCLINE HYCLATE 100 MG
100 TABLET ORAL EVERY 12 HOURS
Status: DISCONTINUED | OUTPATIENT
Start: 2023-12-04 | End: 2023-12-08 | Stop reason: HOSPADM

## 2023-12-04 RX ADMIN — CEFTRIAXONE SODIUM 2 G: 2 INJECTION, POWDER, FOR SOLUTION INTRAMUSCULAR; INTRAVENOUS at 07:12

## 2023-12-04 RX ADMIN — METHOCARBAMOL 750 MG: 750 TABLET ORAL at 05:12

## 2023-12-04 RX ADMIN — AMPICILLIN SODIUM 2 G: 2 INJECTION, POWDER, FOR SOLUTION INTRAMUSCULAR; INTRAVENOUS at 09:12

## 2023-12-04 RX ADMIN — Medication 1 TABLET: at 09:12

## 2023-12-04 RX ADMIN — AMPICILLIN SODIUM 2 G: 2 INJECTION, POWDER, FOR SOLUTION INTRAMUSCULAR; INTRAVENOUS at 03:12

## 2023-12-04 RX ADMIN — CARVEDILOL 12.5 MG: 12.5 TABLET, FILM COATED ORAL at 05:12

## 2023-12-04 RX ADMIN — AMLODIPINE BESYLATE 10 MG: 5 TABLET ORAL at 09:12

## 2023-12-04 RX ADMIN — AMPICILLIN SODIUM 2 G: 2 INJECTION, POWDER, FOR SOLUTION INTRAMUSCULAR; INTRAVENOUS at 06:12

## 2023-12-04 RX ADMIN — CARVEDILOL 12.5 MG: 12.5 TABLET, FILM COATED ORAL at 09:12

## 2023-12-04 RX ADMIN — ENOXAPARIN SODIUM 40 MG: 40 INJECTION SUBCUTANEOUS at 05:12

## 2023-12-04 RX ADMIN — METHOCARBAMOL 750 MG: 750 TABLET ORAL at 01:12

## 2023-12-04 RX ADMIN — GUAIFENESIN 600 MG: 600 TABLET, EXTENDED RELEASE ORAL at 09:12

## 2023-12-04 RX ADMIN — AMPICILLIN SODIUM 2 G: 2 INJECTION, POWDER, FOR SOLUTION INTRAMUSCULAR; INTRAVENOUS at 05:12

## 2023-12-04 RX ADMIN — ATORVASTATIN CALCIUM 20 MG: 10 TABLET, FILM COATED ORAL at 09:12

## 2023-12-04 RX ADMIN — OXYCODONE AND ACETAMINOPHEN 1 TABLET: 10; 325 TABLET ORAL at 10:12

## 2023-12-04 RX ADMIN — AMPICILLIN SODIUM 2 G: 2 INJECTION, POWDER, FOR SOLUTION INTRAMUSCULAR; INTRAVENOUS at 10:12

## 2023-12-04 RX ADMIN — OXYBUTYNIN CHLORIDE 5 MG: 5 TABLET ORAL at 09:12

## 2023-12-04 RX ADMIN — OXYCODONE AND ACETAMINOPHEN 1 TABLET: 10; 325 TABLET ORAL at 09:12

## 2023-12-04 RX ADMIN — DOXYCYCLINE HYCLATE 100 MG: 100 TABLET, COATED ORAL at 09:12

## 2023-12-04 RX ADMIN — FLUOXETINE 20 MG: 20 CAPSULE ORAL at 09:12

## 2023-12-04 RX ADMIN — AMPICILLIN SODIUM 2 G: 2 INJECTION, POWDER, FOR SOLUTION INTRAMUSCULAR; INTRAVENOUS at 01:12

## 2023-12-04 RX ADMIN — FERROUS SULFATE TAB 325 MG (65 MG ELEMENTAL FE) 1 EACH: 325 (65 FE) TAB at 09:12

## 2023-12-04 RX ADMIN — OXYCODONE AND ACETAMINOPHEN 1 TABLET: 10; 325 TABLET ORAL at 05:12

## 2023-12-04 RX ADMIN — TEMAZEPAM 30 MG: 30 CAPSULE ORAL at 09:12

## 2023-12-04 RX ADMIN — OXYCODONE AND ACETAMINOPHEN 1 TABLET: 10; 325 TABLET ORAL at 01:12

## 2023-12-04 RX ADMIN — OXYCODONE AND ACETAMINOPHEN 1 TABLET: 10; 325 TABLET ORAL at 07:12

## 2023-12-04 RX ADMIN — NICOTINE 1 PATCH: 21 PATCH, EXTENDED RELEASE TRANSDERMAL at 09:12

## 2023-12-04 NOTE — PROGRESS NOTES
Inpatient Nutrition Assessment    Admit Date: 11/29/2023   Total duration of encounter: 5 days   Patient Age: 58 y.o.    Nutrition Recommendation/Prescription     -Continue diabetic diet    -Add Malachi BID to provide 90kcal and 2.5g protein per serving.     Communication of Recommendations: reviewed with patient    Nutrition Assessment     Malnutrition Assessment/Nutrition-Focused Physical Exam    Malnutrition Context: chronic illness (12/04/23 1128)  Malnutrition Level: moderate (12/04/23 1128)  Energy Intake (Malnutrition): other (see comments) (does not meet criteria) (12/04/23 1128)  Weight Loss (Malnutrition): greater than 10% in 6 months (12/04/23 1128)  Subcutaneous Fat (Malnutrition): mild depletion (12/04/23 1128)  Orbital Region (Subcutaneous Fat Loss): mild depletion  Upper Arm Region (Subcutaneous Fat Loss): mild depletion     Muscle Mass (Malnutrition): moderate depletion (12/04/23 1128)  Pentecostal Region (Muscle Loss): moderate depletion  Clavicle Bone Region (Muscle Loss): moderate depletion                    Fluid Accumulation (Malnutrition): other (see comments) (does not meet criteria) (12/04/23 1128)        A minimum of two characteristics is recommended for diagnosis of either severe or non-severe malnutrition.    Chart Review    Reason Seen: continuous nutrition monitoring for PU    Malnutrition Screening Tool Results   Have you recently lost weight without trying?: No  Have you been eating poorly because of a decreased appetite?: No   MST Score: 0   Diagnosis:  Sepsis due to infected Right knee / Septic Arthritis, chronic Right distal femur osteomyelitis, right femur infected hardware  s/p Right knee arthrotomy and removal of foreign body ( 11/30/23) and  incision and drainage of right distal femur osteomyelitis with open bone window, removal of hardware deep right femur and implant of antibiotic spacer Stimulan beads (12/1/23)  Acute blood loss anemia, post op- mod  Group B Streptococcus chronic  "right knee infection with septic arthritis of Rt knee  Elevated inflammatory markers -ESR, CRP  Substance abuse Cocaine / tobacco use  Chronic Microcytic / Hypochromic anemia   Diabetes type 2  Protein calorie malnutrition  Paraplegia     Relevant Medical History:  anxiety disorder, neurogenic bladder, suprapubic catheter     Nutrition-Related Medications: B12, B6, docusate sodium, ferrous sulfate, polyethylene glycol  Calorie Containing IV Medications: no significant kcals from medications at this time    Nutrition-Related Labs:  12/3/23 BUN 4.6, Gluc 112    Nutrition Orders:   Diet Adult Regular Diabetic      Appetite/Oral Intake: good/% of meals    Factors Affecting Nutritional Intake: pain    Food/Zoroastrianism/Cultural Preferences: none reported    Food Allergies: no known food allergies    Wound(s):      Altered Skin Integrity 23 2230 Right lateral Ankle #6-Tissue loss description: Full thickness       Altered Skin Integrity 23 1527 Left Heel-Tissue loss description: Full thickness       Altered Skin Integrity 23 1240 Right Buttocks Full thickness tissue loss. Subcutaneous fat may be visible but bone, tendon or muscle are not exposed-Tissue loss description: Full thickness .    Last Bowel Movement: 23    Comments    23 Patient reports good oral intake, denies nausea and vomiting. Patient reports pain was previously affecting his appetite, pain more controlled. Patient willing to try oral nutrition supplement Malachi to promote wound healing.    Anthropometrics    Height: 5' 9" (175.3 cm)    Last Weight: 72.6 kg (160 lb) (23 1540) Weight Method: Bed Scale  BMI (Calculated): 23.6  BMI Classification: normal (BMI 18.5-24.9)        Ideal Body Weight (IBW), Male: 160 lb     % Ideal Body Weight, Male (lb): 100 %                 Usual Body Weight (UBW), k.09 kg  % Usual Body Weight: 86.49     Usual Weight Provided By: patient reports -190lbs about 4 months ago.     Wt " Readings from Last 5 Encounters:   23 72.6 kg (160 lb)   10/24/23 80.3 kg (177 lb)   10/01/23 80.3 kg (177 lb 0.5 oz)   23 80.1 kg (176 lb 9.4 oz)   23 86.2 kg (190 lb)     Weight Change(s) Since Admission: .  Wt Readings from Last 1 Encounters:   23 1540 72.6 kg (160 lb)   23 1211 72.6 kg (160 lb)   Admit Weight: 72.6 kg (160 lb) (23 1211), Weight Method: Bed Scale    Estimated Needs    Weight Used For Calorie Calculations: 72.6 kg (160 lb 0.9 oz)  Energy Calorie Requirements (kcal): 1837 kcal/day (mifflin st jeor x 1.2 SF)  Energy Need Method: [place]-St Jeor  Weight Used For Protein Calculations: 72.6 kg (160 lb 0.9 oz)  Protein Requirements:  g/day (1.3-1.4 g/kg/d)  Fluid Requirements (mL): 1837 mL/day (1mL/kcal)  Temp (24hrs), Av.2 °F (36.8 °C), Min:98 °F (36.7 °C), Max:98.3 °F (36.8 °C)       Enteral Nutrition    Patient not receiving enteral nutrition at this time.    Parenteral Nutrition    Patient not receiving parenteral nutrition support at this time.    Evaluation of Received Nutrient Intake    Calories: meeting estimated needs  Protein: meeting estimated needs    Patient Education    Not applicable.    Nutrition Diagnosis   PES: Moderate chronic disease or condition related malnutrition related to suboptimal protein/energy intake as evidenced by mild fat depletion, moderate muscle depletion, and greater than 10% weight loss in 6 months. (new)     Interventions/Goals     Intervention(s): commercial beverage and collaboration with other providers    Goal: Meet greater than 75% of nutritional needs by follow-up. (new)    Monitoring & Evaluation     Dietitian will monitor food and beverage intake and weight.    Nutrition Risk/Follow-Up: moderate (follow-up in 3-5 days)   Please consult if re-assessment needed sooner.

## 2023-12-04 NOTE — PLAN OF CARE
Patient lives with spouse (that is ill and will have surgery 12/21) in a single story home with ramp. DME in home: hospital bed, WC, mobility chair. Patient reports recently being granted sitters for 28 hours per week. Patient is current with [facility], they come 3x per week to patient's home. PCP: PRIYA Mota, Pharmacy: [facility]     12/04/23 1104   Discharge Assessment   Assessment Type Discharge Planning Assessment   Confirmed/corrected address, phone number and insurance Yes   Confirmed Demographics Correct on Facesheet   Source of Information patient   Reason For Admission Right Knee Pain   People in Home spouse   Do you expect to return to your current living situation? Yes   Prior to hospitilization cognitive status: Alert/Oriented   Current cognitive status: Alert/Oriented   Home Accessibility wheelchair accessible   Home Layout Able to live on 1st floor   Equipment Currently Used at Home wheelchair;hospital bed;other (see comments)  (Mobility Chair)   Readmission within 30 days? No   Patient currently being followed by outpatient case management? No   Do you currently have service(s) that help you manage your care at home? Yes   Name and Contact number of agency [facility]   Is the pt/caregiver preference to resume services with current agency Yes   Do you take prescription medications? Yes   Do you have prescription coverage? Yes   Coverage Medicare/Medicaid   Do you have any problems affording any of your prescribed medications? No   Is the patient taking medications as prescribed? yes   How do you get to doctors appointments? car, drives self   Are you on dialysis? No   Do you take coumadin? No   DME Needed Upon Discharge  other (see comments)  (TBD)   Discharge Plan discussed with: Patient   Transition of Care Barriers None   Discharge Plan A Home Health   Discharge Plan B Long-term acute care facility (LTAC)

## 2023-12-04 NOTE — PROGRESS NOTES
[facility] - 9th Floor Med Surg  Orthopedics  Progress Note    Patient Name: Bianca Khan  MRN: [id number]  Admission Date: 11/29/2023  Hospital Length of Stay: 5 days  Attending Provider: Jaclyn Meier MD  Primary Care Provider: Areli Vargas PA-C  Follow-up For: Procedure(s) (LRB):  INCISION AND DRAINAGE, LOWER EXTREMITY (Right)  REMOVAL, HARDWARE, LOWER EXTREMITY (Right)    Post-Operative Day: 3 Day Post-Op  Subjective:     Principal Problem:Chronic infection of right knee    Principal Orthopedic Problem:  Right femur hardware removal pod 2    Interval History:  Patient resting in bed comfortably.  States he is much improved and doing very well today. Pain well managed.  Receiving antibiotics.  Island dressing in place. CDI. States it has not drained much the last day or so. Wound care managing ankle and hip pressure injury sites.   ID on board for IV abx therapy   Review of patient's allergies indicates:   Allergen Reactions    Baclofen Itching and Anxiety       Current Facility-Administered Medications   Medication    amLODIPine tablet 10 mg    ampicillin (OMNIPEN) 2 g in sodium chloride 0.9 % 100 mL IVPB (MB+)    atorvastatin tablet 20 mg    B12-levomefolate calcium-B6 (FOLBIC RF) 2-1.13-25 mg tablet 1 tablet    carvediloL tablet 12.5 mg    dextrose 10% bolus 125 mL 125 mL    dextrose 10% bolus 250 mL 250 mL    docusate sodium capsule 200 mg    enoxaparin injection 40 mg    ferrous sulfate tablet 1 each    FLUoxetine capsule 20 mg    glucagon (human recombinant) injection 1 mg    glucose chewable tablet 16 g    glucose chewable tablet 24 g    guaiFENesin 12 hr tablet 600 mg    insulin aspart U-100 injection 1-10 Units    methocarbamoL tablet 750 mg    nicotine 21 mg/24 hr 1 patch    ondansetron injection 4 mg    oxybutynin tablet 5 mg    oxyCODONE-acetaminophen  mg per tablet 1 tablet    oxyCODONE-acetaminophen 5-325 mg per tablet 1 tablet    polyethylene glycol packet 17 g     "temazepam capsule 30 mg    zolpidem tablet 5 mg     Objective:     Vital Signs (Most Recent):  Temp: 97.6 °F (36.4 °C) (12/04/23 1555)  Pulse: 84 (12/04/23 1555)  Resp: 16 (12/04/23 0946)  BP: 130/84 (12/04/23 1555)  SpO2: 97 % (12/04/23 1555) Vital Signs (24h Range):  Temp:  [97.6 °F (36.4 °C)-98.3 °F (36.8 °C)] 97.6 °F (36.4 °C)  Pulse:  [73-86] 84  Resp:  [16-20] 16  SpO2:  [95 %-97 %] 97 %  BP: (130-161)/(73-89) 130/84     Weight: 72.6 kg (160 lb)  Height: 5' 9" (175.3 cm)  Body mass index is 23.63 kg/m².      Intake/Output Summary (Last 24 hours) at 12/4/2023 1640  Last data filed at 12/4/2023 0956  Gross per 24 hour   Intake --   Output 400 ml   Net -400 ml         Ortho/SPM Exam  MSK:  Right lower extremity compartments are soft and warm.  Skin is intact.  There are no signs or symptoms of a DVT.  Dressing in place is clean, dry and intact.  Patient is able to feel pressure with palpation.  Decreased sensation to touch secondary to chronic paralysis.  Brisk capillary refill.    Significant Labs: CBC:   Recent Labs   Lab 12/03/23  0601 12/04/23  0352   WBC 10.51 7.99   HGB 8.4* 8.9*   HCT 27.3* 27.8*   * 455*           All pertinent labs within the past 24 hours have been reviewed.    Significant Imaging: I have reviewed all pertinent imaging results/findings.    Assessment/Plan:     Active Diagnoses:    Diagnosis Date Noted POA    PRINCIPAL PROBLEM:  Chronic infection of right knee [M00.9] 11/30/2023 Yes    Pain and swelling of knee, right [M25.561, M25.461] 11/30/2023 Unknown      Problems Resolved During this Admission:     -patient is nonambulatory and will receive Lovenox for DVT prophylaxis during stay and x 14 days upon discharge followed by aspirin 81 mg BID.  -dressing changes to RLE. No showers to POD 7. Keep clean and dry. No ointments or creams  -continue IV antibiotics per ID. Multibacterial infection seen on cultures.  H&H stable.   NWB. Patient does have paralysis.   Follow up in 2-3 " weeks with Ada Alexandra for wound check and repeat x rays.           Ada Alexandra PA-C  Orthopedics  [facility] - 9th Floor Med Surg

## 2023-12-04 NOTE — PROGRESS NOTES
"Inpatient Nutrition Evaluation    Admit Date: 11/29/2023   Total duration of encounter: 5 days    Nutrition Recommendation/Prescription     ***    Nutrition Assessment     Chart Review    Reason Seen: continuous nutrition monitoring for PU    Malnutrition Screening Tool Results   Have you recently lost weight without trying?: No  Have you been eating poorly because of a decreased appetite?: No   MST Score: 0     Diagnosis:  ***    Relevant Medical History: ***    Nutrition-Related Medications: ***    Nutrition-Related Labs:  ***    Diet Order: Diet Adult Regular Diabetic  Oral Supplement Order: { Nutrition Oral Supplements:80188}  Appetite/Oral Intake: { Nutrition Appetite:81135}/{ Nutrition %Intake:62562}  Factors Affecting Nutritional Intake: { Nutrition Factors Affecting Intake:77968}  Food/Synagogue/Cultural Preferences: { Nutrition Preferences:46584::"none reported"}  Food Allergies: { Nutrition Food Allergens:71201}    Skin Integrity: incision, wound  Wound(s):      Altered Skin Integrity 06/28/23 2230 Right lateral Ankle #6-Tissue loss description: Full thickness       Altered Skin Integrity 09/27/23 1527 Left Heel-Tissue loss description: Full thickness       Altered Skin Integrity 11/30/23 1240 Right Buttocks Full thickness tissue loss. Subcutaneous fat may be visible but bone, tendon or muscle are not exposed-Tissue loss description: Full thickness ***    Comments    ***    Anthropometrics    Height: 5' 9" (175.3 cm)    Last Weight: 72.6 kg (160 lb) (11/30/23 1540) Weight Method: Bed Scale  BMI (Calculated): 23.6  BMI Classification: {BMI Grade:21293}        Ideal Body Weight (IBW), Male: 160 lb     % Ideal Body Weight, Male (lb): 100 %                          Usual Weight Provided By: { Nutrition UBW:73990}    Wt Readings from Last 5 Encounters:   11/30/23 72.6 kg (160 lb)   10/24/23 80.3 kg (177 lb)   10/01/23 80.3 kg (177 lb 0.5 oz)   09/06/23 80.1 kg (176 lb 9.4 oz)   07/04/23 86.2 kg " "(190 lb)     Weight Change(s) Since Admission:  Admit Weight: 72.6 kg (160 lb) (23 1211)  ***    Patient Education    { Nutrition Education Insert:83210::"Not applicable."}    Monitoring & Evaluation     Dietitian will monitor { Nutrition Monitorin}.  Nutrition Risk/Follow-Up: low (follow-up in 5-7 days)  Patients assigned 'low nutrition risk' status do not qualify for a full nutritional assessment but will be monitored and re-evaluated in a 5-7 day time period. Please consult if re-evaluation needed sooner.   "

## 2023-12-05 LAB
BASOPHILS # BLD AUTO: 0.04 X10(3)/MCL
BASOPHILS NFR BLD AUTO: 0.5 %
EOSINOPHIL # BLD AUTO: 0.09 X10(3)/MCL (ref 0–0.9)
EOSINOPHIL NFR BLD AUTO: 1.2 %
ERYTHROCYTE [DISTWIDTH] IN BLOOD BY AUTOMATED COUNT: 17.5 % (ref 11.5–17)
HCT VFR BLD AUTO: 29.8 % (ref 42–52)
HGB BLD-MCNC: 9.4 G/DL (ref 14–18)
IMM GRANULOCYTES # BLD AUTO: 0.05 X10(3)/MCL (ref 0–0.04)
IMM GRANULOCYTES NFR BLD AUTO: 0.7 %
LYMPHOCYTES # BLD AUTO: 3.21 X10(3)/MCL (ref 0.6–4.6)
LYMPHOCYTES NFR BLD AUTO: 42.4 %
MCH RBC QN AUTO: 24.8 PG (ref 27–31)
MCHC RBC AUTO-ENTMCNC: 31.5 G/DL (ref 33–36)
MCV RBC AUTO: 78.6 FL (ref 80–94)
MONOCYTES # BLD AUTO: 0.62 X10(3)/MCL (ref 0.1–1.3)
MONOCYTES NFR BLD AUTO: 8.2 %
NEUTROPHILS # BLD AUTO: 3.56 X10(3)/MCL (ref 2.1–9.2)
NEUTROPHILS NFR BLD AUTO: 47 %
NRBC BLD AUTO-RTO: 0 %
PLATELET # BLD AUTO: 520 X10(3)/MCL (ref 130–400)
PMV BLD AUTO: 9.2 FL (ref 7.4–10.4)
POCT GLUCOSE: 113 MG/DL (ref 70–110)
RBC # BLD AUTO: 3.79 X10(6)/MCL (ref 4.7–6.1)
WBC # SPEC AUTO: 7.57 X10(3)/MCL (ref 4.5–11.5)

## 2023-12-05 PROCEDURE — S4991 NICOTINE PATCH NONLEGEND: HCPCS | Performed by: NURSE PRACTITIONER

## 2023-12-05 PROCEDURE — 25000003 PHARM REV CODE 250: Performed by: NURSE PRACTITIONER

## 2023-12-05 PROCEDURE — 85025 COMPLETE CBC W/AUTO DIFF WBC: CPT | Performed by: INTERNAL MEDICINE

## 2023-12-05 PROCEDURE — 25000003 PHARM REV CODE 250: Performed by: INTERNAL MEDICINE

## 2023-12-05 PROCEDURE — 63600175 PHARM REV CODE 636 W HCPCS: Performed by: INTERNAL MEDICINE

## 2023-12-05 PROCEDURE — 63600175 PHARM REV CODE 636 W HCPCS: Performed by: PHYSICIAN ASSISTANT

## 2023-12-05 PROCEDURE — 63600175 PHARM REV CODE 636 W HCPCS: Performed by: NURSE PRACTITIONER

## 2023-12-05 PROCEDURE — 21400001 HC TELEMETRY ROOM

## 2023-12-05 RX ORDER — LABETALOL HYDROCHLORIDE 5 MG/ML
20 INJECTION, SOLUTION INTRAVENOUS
Status: DISCONTINUED | OUTPATIENT
Start: 2023-12-05 | End: 2023-12-08 | Stop reason: HOSPADM

## 2023-12-05 RX ORDER — CARVEDILOL 12.5 MG/1
25 TABLET ORAL 2 TIMES DAILY WITH MEALS
Status: DISCONTINUED | OUTPATIENT
Start: 2023-12-05 | End: 2023-12-08 | Stop reason: HOSPADM

## 2023-12-05 RX ORDER — HYDRALAZINE HYDROCHLORIDE 20 MG/ML
20 INJECTION INTRAMUSCULAR; INTRAVENOUS
Status: DISCONTINUED | OUTPATIENT
Start: 2023-12-05 | End: 2023-12-08 | Stop reason: HOSPADM

## 2023-12-05 RX ADMIN — AMPICILLIN SODIUM 2 G: 2 INJECTION, POWDER, FOR SOLUTION INTRAMUSCULAR; INTRAVENOUS at 10:12

## 2023-12-05 RX ADMIN — METHOCARBAMOL 750 MG: 750 TABLET ORAL at 12:12

## 2023-12-05 RX ADMIN — FERROUS SULFATE TAB 325 MG (65 MG ELEMENTAL FE) 1 EACH: 325 (65 FE) TAB at 08:12

## 2023-12-05 RX ADMIN — NICOTINE 1 PATCH: 21 PATCH, EXTENDED RELEASE TRANSDERMAL at 09:12

## 2023-12-05 RX ADMIN — CEFTRIAXONE SODIUM 2 G: 2 INJECTION, POWDER, FOR SOLUTION INTRAMUSCULAR; INTRAVENOUS at 07:12

## 2023-12-05 RX ADMIN — AMLODIPINE BESYLATE 10 MG: 5 TABLET ORAL at 09:12

## 2023-12-05 RX ADMIN — CARVEDILOL 12.5 MG: 12.5 TABLET, FILM COATED ORAL at 09:12

## 2023-12-05 RX ADMIN — DOXYCYCLINE HYCLATE 100 MG: 100 TABLET, COATED ORAL at 09:12

## 2023-12-05 RX ADMIN — GUAIFENESIN 600 MG: 600 TABLET, EXTENDED RELEASE ORAL at 09:12

## 2023-12-05 RX ADMIN — OXYBUTYNIN CHLORIDE 5 MG: 5 TABLET ORAL at 09:12

## 2023-12-05 RX ADMIN — ATORVASTATIN CALCIUM 20 MG: 10 TABLET, FILM COATED ORAL at 08:12

## 2023-12-05 RX ADMIN — METHOCARBAMOL 750 MG: 750 TABLET ORAL at 06:12

## 2023-12-05 RX ADMIN — OXYCODONE AND ACETAMINOPHEN 1 TABLET: 10; 325 TABLET ORAL at 04:12

## 2023-12-05 RX ADMIN — DOXYCYCLINE HYCLATE 100 MG: 100 TABLET, COATED ORAL at 08:12

## 2023-12-05 RX ADMIN — Medication 1 TABLET: at 10:12

## 2023-12-05 RX ADMIN — AMPICILLIN SODIUM 2 G: 2 INJECTION, POWDER, FOR SOLUTION INTRAMUSCULAR; INTRAVENOUS at 02:12

## 2023-12-05 RX ADMIN — FERROUS SULFATE TAB 325 MG (65 MG ELEMENTAL FE) 1 EACH: 325 (65 FE) TAB at 09:12

## 2023-12-05 RX ADMIN — OXYCODONE AND ACETAMINOPHEN 1 TABLET: 10; 325 TABLET ORAL at 09:12

## 2023-12-05 RX ADMIN — ZOLPIDEM TARTRATE 5 MG: 5 TABLET ORAL at 08:12

## 2023-12-05 RX ADMIN — ENOXAPARIN SODIUM 40 MG: 40 INJECTION SUBCUTANEOUS at 04:12

## 2023-12-05 RX ADMIN — OXYCODONE AND ACETAMINOPHEN 1 TABLET: 10; 325 TABLET ORAL at 06:12

## 2023-12-05 RX ADMIN — AMPICILLIN SODIUM 2 G: 2 INJECTION, POWDER, FOR SOLUTION INTRAMUSCULAR; INTRAVENOUS at 09:12

## 2023-12-05 RX ADMIN — FLUOXETINE 20 MG: 20 CAPSULE ORAL at 09:12

## 2023-12-05 RX ADMIN — METHOCARBAMOL 750 MG: 750 TABLET ORAL at 05:12

## 2023-12-05 RX ADMIN — AMPICILLIN SODIUM 2 G: 2 INJECTION, POWDER, FOR SOLUTION INTRAMUSCULAR; INTRAVENOUS at 06:12

## 2023-12-05 RX ADMIN — OXYCODONE AND ACETAMINOPHEN 1 TABLET: 10; 325 TABLET ORAL at 10:12

## 2023-12-05 RX ADMIN — OXYCODONE AND ACETAMINOPHEN 1 TABLET: 10; 325 TABLET ORAL at 02:12

## 2023-12-05 RX ADMIN — GUAIFENESIN 600 MG: 600 TABLET, EXTENDED RELEASE ORAL at 08:12

## 2023-12-05 RX ADMIN — HYDRALAZINE HYDROCHLORIDE 20 MG: 20 INJECTION, SOLUTION INTRAMUSCULAR; INTRAVENOUS at 12:12

## 2023-12-05 RX ADMIN — OXYCODONE AND ACETAMINOPHEN 1 TABLET: 10; 325 TABLET ORAL at 12:12

## 2023-12-05 RX ADMIN — AMPICILLIN SODIUM 2 G: 2 INJECTION, POWDER, FOR SOLUTION INTRAMUSCULAR; INTRAVENOUS at 05:12

## 2023-12-05 RX ADMIN — METHOCARBAMOL 750 MG: 750 TABLET ORAL at 11:12

## 2023-12-05 RX ADMIN — OXYBUTYNIN CHLORIDE 5 MG: 5 TABLET ORAL at 08:12

## 2023-12-05 RX ADMIN — CARVEDILOL 25 MG: 12.5 TABLET, FILM COATED ORAL at 04:12

## 2023-12-05 NOTE — PLAN OF CARE
Referral sent to [facility] via [facility] for IV antibiotics.     1300: Spoke to Reddy with [facility], costs for IV antibiotics too expensive for patient. Reddy with discuss with Dr. Hutchison office and see alternatives if possible.     1540: Spoke to Reddy, unable to get alternatives. Sent referral to [facility] for possible IV infusion cost, spoke to Erika. Erika will notify regarding cost once ran through insurance. If patient is unable to afford, may need LTAC.

## 2023-12-05 NOTE — PROGRESS NOTES
[facility] Hospital Medicine Progress Note        Chief Complaint: Inpatient Follow-up for Rt knee septic arthritis        HPI:   58 yr old male with PMHx of  HTN, DM II,  Afib (on Xarelto), paraplegia secondary to T- spine cord injury( T1-T6), neurogenic bladder with chronic suprapubic catheter, hepatitis-C, chronic right ankle wound/osteomyelitis, presented to the ED with right knee pain with purulent drainage which apparently started this morning. He was treated for right patellar bursa abscess and S/P I&D on 6/29/23. Cultures at that time grew streptococcus agalactiae. He's on chronic suppressive therapy with oral  Doxycycline.      Vital signs on arrival: T 97.9, P 83, R 20, B/P 96/56, sats 98% on room air. Initial labs include WBC 17.28, Hgb 11.1, Hct 36.6, BUN 12.6, creatinine 1.29, ESR 65. X-ray of the right knee showed soft tissue swelling with no acute abnormality.  X-ray of the femur showed internal fixation with intact hardware and significant deformity of distal femur from remote trauma. Meds given in ED include Zosyn and Vancomycin.     Orthopedic surgery was consulted for right septic knee.  Per chart review patient has a chronic infection of the right knee. Patient has been offered above-the-knee amputation several times by Orthopedic surgery which he has declined. Infectious Disease service was consulted as well. Pt is well known to Dr. Hutchison's service. Blood cultures have been negative.  Right knee abscess culture from 11/29 showing similar bacteria moderate growth of Streptococcus agalactiae.        Pt was taken to OR  by ortho  Dr. Shen on 11/30 showed  gross purulence throughout the knee joint and hardware.  Also retained foreign bodies from sponge changes. Pt underwent  Right knee arthrotomy and removal of foreign body. Pt was taken back to OR on 12/1/23   due to the gross amount of purulence and underwent incision and drainage of right distal femur osteomyelitis  with open bone window, removal of hardware deep right femur and implant of antibiotic spacer Stimulan beads. Intraoperative culture from 11/30 grew out E.coli, GBS, Enterococcus faecalis. No anaerobes isolated. ID changed antibiotic to IV Ampicillin 2 gram q4h.        Interval Hx:   Patient is resting comfortably no new issues reported.  Patient is afebrile, room air, and hemodynamically stable. Case was discussed with  on the floor.      Objective/physical exam:  General: in no acute distress  HENT: normocephalic, atraumatic  Eye: PERRL, EOMI, clear conjunctiva  Neck: full ROM, no thyromegaly, no JVD  Respiratory: clear to auscultation bilaterally  Cardiovascular: regular rate and rhythm  Gastrointestinal: non-distended, positive bowel sounds, non-tender  Genitourinary:  Suprapubic catheter  Musculoskeletal: no gross deformity  Integumentary: warm, dry, intact, no rashes  Neurological: cranial nerves grossly intact, paraplegic  Psychiatric: cooperative, flat affect, depressed appearing      VITAL SIGNS: 24 HRS MIN & MAX LAST   Temp  Min: 97.2 °F (36.2 °C)  Max: 98.6 °F (37 °C) 98.3 °F (36.8 °C)   BP  Min: 114/70  Max: 161/89 (!) 144/80   Pulse  Min: 74  Max: 84  76   Resp  Min: 16  Max: 18 16   SpO2  Min: 95 %  Max: 98 % 98 %     I have reviewed the following labs:  Recent Labs   Lab 12/03/23  0601 12/04/23  0352 12/05/23  0438   WBC 10.51 7.99 7.57   RBC 3.45* 3.57* 3.79*   HGB 8.4* 8.9* 9.4*   HCT 27.3* 27.8* 29.8*   MCV 79.1* 77.9* 78.6*   MCH 24.3* 24.9* 24.8*   MCHC 30.8* 32.0* 31.5*   RDW 17.7* 17.3* 17.5*   * 455* 520*   MPV 9.6 9.1 9.2     Recent Labs   Lab 11/29/23  1257 11/29/23  1600 11/30/23  0424 12/03/23  0601   *  --  137 140   K 4.7  --  4.2 4.7   CO2 16*  --  24 30*   BUN 12.6  --  9.6 4.6*   CREATININE 1.29*  --  0.77 0.70*   CALCIUM 8.7  --  8.4 9.2   PH  --  7.390  --   --    MG  --   --   --  1.90   ALBUMIN 2.7*  --  2.3*  --    ALKPHOS 78  --  63  --    ALT 13  --  11   --    AST 22  --  16  --    BILITOT 0.4  --  0.4  --      Microbiology Results (last 7 days)       Procedure Component Value Units Date/Time    Blood Culture [[id number]]  (Normal) Collected: 11/29/23 1257    Order Status: Completed Specimen: Blood Updated: 12/04/23 1401     CULTURE, BLOOD ([facility]) No Growth at 5 days    Blood Culture [[id number]]  (Normal) Collected: 11/29/23 1257    Order Status: Completed Specimen: Blood Updated: 12/04/23 1401     CULTURE, BLOOD ([facility]) No Growth at 5 days    Wound Culture [[id number]]  (Abnormal)  (Susceptibility) Collected: 11/30/23 1431    Order Status: Completed Specimen: Abscess from Knee, Right Updated: 12/04/23 1010     Wound Culture Few Escherichia coli      Few Enterococcus faecalis      Few Streptococcus agalactiae (Group B)    Wound Culture [[id number]]  (Abnormal)  (Susceptibility) Collected: 11/29/23 1536    Order Status: Completed Specimen: Abscess from Knee, Right Updated: 12/03/23 0808     Wound Culture Moderate Streptococcus agalactiae (Group B)      Moderate Enterococcus faecalis    Anaerobic Culture [[id number]] Collected: 11/30/23 1431    Order Status: Completed Specimen: Abscess from Knee, Right Updated: 12/03/23 0713     Anaerobe Culture No Anaerobes Isolated    Gram Stain [[id number]] Collected: 11/30/23 1431    Order Status: Completed Specimen: Abscess from Knee, Right Updated: 12/01/23 0744     GRAM STAIN Rare WBC observed      No bacteria seen    Fungal Culture [[id number]] Collected: 11/30/23 1431    Order Status: Sent Specimen: Abscess from Knee, Right Updated: 11/30/23 1443    Aerobic culture [[id number]] Collected: 11/30/23 1431    Order Status: Canceled Specimen: Abscess from Knee, Right Updated: 11/30/23 1443             See below for Radiology    Scheduled Med:   amLODIPine  10 mg Oral Daily    ampicillin IVPB  2 g Intravenous Q4H    atorvastatin  20 mg Oral Nightly    B12-levomefolate calcium-B6  1 tablet Oral Daily    carvediloL  12.5 mg Oral BID WM     cefTRIAXone (ROCEPHIN) IVPB  2 g Intravenous Q24H    docusate sodium  200 mg Oral BID    doxycycline  100 mg Oral Q12H    enoxaparin  40 mg Subcutaneous Daily    ferrous sulfate  1 tablet Oral BID    FLUoxetine  20 mg Oral Daily    guaiFENesin  600 mg Oral BID    methocarbamoL  750 mg Oral Q6H    nicotine  1 patch Transdermal Daily    oxybutynin  5 mg Oral BID    polyethylene glycol  17 g Oral Daily      Continuous Infusions:  None.       PRN Meds:  dextrose 10%, dextrose 10%, glucagon (human recombinant), glucose, glucose, insulin aspart U-100, ondansetron, oxyCODONE-acetaminophen, oxyCODONE-acetaminophen, temazepam, zolpidem     Assessment/Plan:  Sepsis due to Right knee septic arthritis, chronic Right distal femur osteomyelitis, right femur infected hardware status post Right knee arthrotomy and removal of foreign body (11/30/23) and incision and drainage of right distal femur osteomyelitis with open bone window, removal of hardware deep right femur and implant of antibiotic spacer Stimulan beads (12/1/23)  Anemia of chronic  Polysubstance abuse  Non-insulin-dependent type 2 diabetes mellitus  Moderate protein calorie malnutrition  Paraplegia      Hx- Frequent UTI, Generalized Anxiety disorder, Neurogenic bladder, Presence of Suprapubic catheter,        Plan-   Place PICC line today and continue current antibiotic regimen as outlined by Infectious Disease  Case management arranging home intravenous antibiotic    VTE prophylaxis: Lovenox      Patient condition:  Stable     Anticipated discharge and Disposition:     Hopefully home tomorrow         All diagnosis and differential diagnosis have been reviewed; assessment and plan has been documented; I have personally reviewed the labs and test results that are presently available; I have reviewed the patients medication list; I have reviewed the consulting providers response and recommendations. I have reviewed or attempted to review medical records based upon  their availability    All of the patient's questions have been  addressed and answered. Patient's is agreeable to the above stated plan. I will continue to monitor closely and make adjustments to medical management as needed.  ___________________    Karri Corbin MD   12/05/2023

## 2023-12-05 NOTE — PROGRESS NOTES
Infectious Diseases Progress Note  58-year-old male with past medical history of T-spine cord injury with paraplegia, diabetes, HTN, hepatitis-C, chronic MRSA L ankle wound/osteomyelitis, was on suppressive doxycycline, known to my service, seen by us initially at [facility] and has followed with us at the office for chronic osteomyelitis, most recently seen during his prolonged hospital admission of 06/28/2023, this same facility, [facility] when he had presented with complaints of pain and swelling to his right knee, apparently struck his knee.  He did also report prior remote right knee surgery.  He was evaluated and noted to have no fevers initially but a temperature of 100.2° today 6/29, no leukocytosis but with thrombocytosis of up to 531 today.  .2 and ESR >130.  CT scan of the right knee at the time noted a 5 cm area of subcutaneous fluid collection in the prepatellar region.  There was aspiration with findings of purulent fluid and cultures showing group B Streptococcus.  He was seen by the orthopedic surgery team with findings of right prepatellar bursa abscess and had incision and drainage of right knee septic arthritis and right prepatellar bursa abscess on 6/29 with cultures yielding group B Streptococcus.  He was treated with a prolonged course of ceftriaxone.  That hospital stay was complicated with acute kidney injury and acute hypoxic respiratory failure with pulmonary edema, pleural effusions and pneumonitis requiring prolonged ICU management on ventilator with tracheostomy and PEG tube placement on 08/06.  He was subsequently stabilized and discharged to LTAC on 08/09 and then discharged home from LTAC on 10/03.  He is admitted this time on 11/29/2023 presenting through the ED with complaints of right knee purulent drainage.  He was evaluated and noted to have no fevers but with leukocytosis of 17.28, ESR 65, .1, abnormal renal function with  creatinine of 1.29, anemic with low albumin.  Urine toxicology test positive for cocaine, benzodiazepines and opiates.  Blood cultures have been negative.  Right knee abscess culture from 11/29 showing moderate Streptococcus agalactiae and Enterococcus. X-ray of the right knee showed soft tissue swelling with no acute abnormality.  X-ray of the femur showed internal fixation with intact hardware and significant deformity of distal femur from remote trauma.  He was seen by ortho team of Dr. Shen, taken to surgery today 11/30 for right knee arthrotomy and removal of foreign body due to infection with cultures revealing GBS, Enterococcus and Ecoli. He is currently on antibiotic coverage with Ampicillin     Subjective:  Lying in bed in no acute distress. No new complaints voiced. Afebrile.     ROS  Constitutional:  Positive for malaise/fatigue.   HENT: Negative.     Respiratory: Negative.     Gastrointestinal: Negative.    Genitourinary: Negative.    Musculoskeletal: Negative.         Right knee chronic infection with draining wound   Neurological:  Positive for focal weakness and weakness.   Endo/Heme/Allergies: Negative.    Psychiatric/Behavioral: Negative.     All other Systems review done and negative    Review of patient's allergies indicates:   Allergen Reactions    Baclofen Itching and Anxiety       Past Medical History:   Diagnosis Date    Arthritis     Chronic ulcer of ankle 05/26/2022    Frequent UTI 07/02/2019    Generalized anxiety disorder 05/26/2022    Neurogenic bladder 05/26/2022    Osteomyelitis 05/26/2022    Presence of suprapubic catheter 05/26/2022    Pure hypercholesterolemia 05/26/2022    Retention of urine, unspecified 08/09/2019    Spinal cord injury at T1-T6 level 04/20/2018       Past Surgical History:   Procedure Laterality Date    FRACTURE SURGERY  2021    INCISION AND DRAINAGE, LOWER EXTREMITY Right 06/29/2023    Procedure: INCISION AND DRAINAGE, LOWER EXTREMITY;  Surgeon: Prabhu Shen DO;   Location: [facility] OR;  Service: Orthopedics;  Laterality: Right;  supine bone foam wash stuff cultures    INCISION AND DRAINAGE, LOWER EXTREMITY Right 2023    Procedure: INCISION AND DRAINAGE, LOWER EXTREMITY;  Surgeon: Prabhu Shen DO;  Location: [facility] OR;  Service: Orthopedics;  Laterality: Right;  supine any table bone foam wash stuff possible wound vac    INCISION AND DRAINAGE, LOWER EXTREMITY Right 2023    Procedure: INCISION AND DRAINAGE, LOWER EXTREMITY;  Surgeon: Prabhu Shen DO;  Location: [facility] OR;  Service: Orthopedics;  Laterality: Right;  supine bone foam vascular bed removal of distal femoral plate, wash stuff, possible AKA    INSERTION OF INTRAMEDULLARY MARISA Right 08/10/2022    Procedure: INSERTION, INTRAMEDULLARY MARISA RIGHT TIBIA;  Surgeon: Jorge Orellana MD;  Location: [facility] OR;  Service: Orthopedics;  Laterality: Right;    JOINT REPLACEMENT      Ankel    REMOVAL OF HARDWARE FROM LOWER EXTREMITY Right 2023    Procedure: REMOVAL, HARDWARE, LOWER EXTREMITY;  Surgeon: Prabhu Shen DO;  Location: [facility] OR;  Service: Orthopedics;  Laterality: Right;    SPINE SURGERY  2018       Social History     Socioeconomic History    Marital status:    Tobacco Use    Smoking status: Some Days     Current packs/day: 0.00     Average packs/day: 0.2 packs/day for 41.5 years (10.4 ttl pk-yrs)     Types: Cigars, Cigarettes     Start date: 1982     Last attempt to quit: 2023     Years since quittin.3    Smokeless tobacco: Never   Substance and Sexual Activity    Alcohol use: Not Currently     Alcohol/week: 2.0 standard drinks of alcohol     Types: 2 Cans of beer per week    Drug use: Not Currently     Types: Oxycodone    Sexual activity: Not Currently     Partners: Female     Birth control/protection: None     Social Determinants of Health     Financial Resource Strain: Low Risk  (2023)    Overall Financial Resource Strain (CARDIA)     Difficulty of Paying Living  Expenses: Not very hard   Food Insecurity: No Food Insecurity (8/9/2023)    Hunger Vital Sign     Worried About Running Out of Food in the Last Year: Never true     Ran Out of Food in the Last Year: Never true   Transportation Needs: No Transportation Needs (8/9/2023)    PRAPARE - Transportation     Lack of Transportation (Medical): No     Lack of Transportation (Non-Medical): No   Physical Activity: Inactive (8/9/2023)    Exercise Vital Sign     Days of Exercise per Week: 0 days     Minutes of Exercise per Session: 0 min   Stress: No Stress Concern Present (8/9/2023)    Citizen of [place] of Occupational Health - Occupational Stress Questionnaire     Feeling of Stress : Only a little   Social Connections: Moderately Isolated (10/3/2023)    Social Connection and Isolation Panel [NHANES]     Frequency of Communication with Friends and Family: Twice a week     Frequency of Social Gatherings with Friends and Family: Twice a week     Attends [employer] Services: Never     Active Member of Clubs or Organizations: No     Attends Club or Organization Meetings: Never     Marital Status:    Housing Stability: Low Risk  (8/9/2023)    Housing Stability Vital Sign     Unable to Pay for Housing in the Last Year: No     Number of Places Lived in the Last Year: 1     Unstable Housing in the Last Year: No         Scheduled Meds:   amLODIPine  10 mg Oral Daily    ampicillin IVPB  2 g Intravenous Q4H    atorvastatin  20 mg Oral Nightly    B12-levomefolate calcium-B6  1 tablet Oral Daily    carvediloL  12.5 mg Oral BID WM    cefTRIAXone (ROCEPHIN) IVPB  2 g Intravenous Q24H    docusate sodium  200 mg Oral BID    doxycycline  100 mg Oral Q12H    enoxaparin  40 mg Subcutaneous Daily    ferrous sulfate  1 tablet Oral BID    FLUoxetine  20 mg Oral Daily    guaiFENesin  600 mg Oral BID    methocarbamoL  750 mg Oral Q6H    nicotine  1 patch Transdermal Daily    oxybutynin  5 mg Oral BID    polyethylene glycol  17 g Oral Daily  "    Continuous Infusions:  PRN Meds:dextrose 10%, dextrose 10%, glucagon (human recombinant), glucose, glucose, insulin aspart U-100, ondansetron, oxyCODONE-acetaminophen, oxyCODONE-acetaminophen, temazepam, zolpidem    Objective:  /84   Pulse 84   Temp 97.6 °F (36.4 °C) (Oral)   Resp 16   Ht 5' 9" (1.753 m)   Wt 72.6 kg (160 lb)   SpO2 97%   BMI 23.63 kg/m²     Physical Exam:   Physical Exam  Vitals reviewed.   Constitutional:       General: He is not in acute distress.  HENT:      Head: Normocephalic and atraumatic.      Mouth/Throat:      Comments: Edentulous  Eyes:      Pupils: Pupils are equal, round, and reactive to light.   Cardiovascular:      Rate and Rhythm: Normal rate and regular rhythm.      Heart sounds: Normal heart sounds.   Pulmonary:      Effort: Pulmonary effort is normal. No respiratory distress.      Breath sounds: Normal breath sounds.   Abdominal:      General: Bowel sounds are normal. There is no distension.      Palpations: Abdomen is soft.      Tenderness: There is no abdominal tenderness.   Genitourinary:     Comments: Stover catheter in place  Musculoskeletal:         General: No deformity.      Cervical back: Neck supple.      Comments: Contracted lower extremities   Skin:     Findings: No erythema or rash.      Comments: Right knee dressed from surgery   Neurological:      Mental Status: He is alert and oriented to person, place, and time.   Psychiatric:      Comments: Calm and cooperative    Imaging      Lab Review   Recent Results (from the past 24 hour(s))   POCT glucose    Collection Time: 12/03/23  8:17 PM   Result Value Ref Range    POCT Glucose 109 70 - 110 mg/dL   CBC with Differential    Collection Time: 12/04/23  3:52 AM   Result Value Ref Range    WBC 7.99 4.50 - 11.50 x10(3)/mcL    RBC 3.57 (L) 4.70 - 6.10 x10(6)/mcL    Hgb 8.9 (L) 14.0 - 18.0 g/dL    Hct 27.8 (L) 42.0 - 52.0 %    MCV 77.9 (L) 80.0 - 94.0 fL    MCH 24.9 (L) 27.0 - 31.0 pg    MCHC 32.0 (L) 33.0 - " 36.0 g/dL    RDW 17.3 (H) 11.5 - 17.0 %    Platelet 455 (H) 130 - 400 x10(3)/mcL    MPV 9.1 7.4 - 10.4 fL    Neut % 46.6 %    Lymph % 41.7 %    Mono % 9.5 %    Eos % 1.3 %    Basophil % 0.4 %    Lymph # 3.33 0.6 - 4.6 x10(3)/mcL    Neut # 3.73 2.1 - 9.2 x10(3)/mcL    Mono # 0.76 0.1 - 1.3 x10(3)/mcL    Eos # 0.10 0 - 0.9 x10(3)/mcL    Baso # 0.03 <=0.2 x10(3)/mcL    IG# 0.04 0 - 0.04 x10(3)/mcL    IG% 0.5 %    NRBC% 0.3 %   POCT glucose    Collection Time: 12/04/23 12:39 PM   Result Value Ref Range    POCT Glucose 99 70 - 110 mg/dL       Assessment/Plan:  1.  Sepsis with leukocytosis  2.  Chronic right knee infection with septic arthritis and osteomyelitis of the femur - GBS / Enterococcus / Ecoli isolates  3. Acute kidney injury/chronic kidney disease  4.  Elevated ESR, CRP  5.  Cocaine / tobacco use  6.  Diabetes type 2  7.  Anemia   8.  Protein calorie malnutrition  9.  Paraplegia   10. Chronic MRSA L ankle/foot osteomyelitis with retained hardware     -Continue Ampicillin #5, add Ceftriaxone #1 and place back on chronic suppression with oral Doxycycline. Plan a 6 week course following inflammatory markers (End date 1/15/24)  -Will need chronic oral suppressive antibiotic therapy with ampicillin and doxycycline upon completion of IV antibiotics  -Afebrile without leukocytosis, follow  -12/2 ESR > 130 from 65 and .8 from 211.10  -Ortho on board, inputs noted  -S/P R knee arthrotomy with removal of foreign body on 11/30, cultures revealed Streptococcus agalactiae, Ecoli and Enterococcus  -S/P I&D R femur with removal of hardware and placement of antibiotic spacer  -R knee abscess culture from 11/29 revealed moderate Streptococcus agalactiae and Enterococcus  -Discussed with patient and nursing. CM to work on discharge home with HH and OPAT. He will follow up with us as an outpatient about 2 weeks post discharge

## 2023-12-05 NOTE — PROGRESS NOTES
Infectious Diseases Progress Note  58-year-old male with past medical history of T-spine cord injury with paraplegia, diabetes, HTN, hepatitis-C, chronic MRSA L ankle wound/osteomyelitis, was on suppressive doxycycline, known to my service, seen by us initially at [facility] and has followed with us at the office for chronic osteomyelitis, most recently seen during his prolonged hospital admission of 06/28/2023, this same facility, [facility] when he had presented with complaints of pain and swelling to his right knee, apparently struck his knee.  He did also report prior remote right knee surgery.  He was evaluated and noted to have no fevers initially but a temperature of 100.2° today 6/29, no leukocytosis but with thrombocytosis of up to 531 today.  .2 and ESR >130.  CT scan of the right knee at the time noted a 5 cm area of subcutaneous fluid collection in the prepatellar region.  There was aspiration with findings of purulent fluid and cultures showing group B Streptococcus.  He was seen by the orthopedic surgery team with findings of right prepatellar bursa abscess and had incision and drainage of right knee septic arthritis and right prepatellar bursa abscess on 6/29 with cultures yielding group B Streptococcus.  He was treated with a prolonged course of ceftriaxone.  That hospital stay was complicated with acute kidney injury and acute hypoxic respiratory failure with pulmonary edema, pleural effusions and pneumonitis requiring prolonged ICU management on ventilator with tracheostomy and PEG tube placement on 08/06.  He was subsequently stabilized and discharged to LTAC on 08/09 and then discharged home from LTAC on 10/03.  He is admitted this time on 11/29/2023 presenting through the ED with complaints of right knee purulent drainage.  He was evaluated and noted to have no fevers but with leukocytosis of 17.28, ESR 65, .1, abnormal renal function with  creatinine of 1.29, anemic with low albumin.  Urine toxicology test positive for cocaine, benzodiazepines and opiates.  Blood cultures have been negative.  Right knee abscess culture from 11/29 showing moderate Streptococcus agalactiae and Enterococcus. X-ray of the right knee showed soft tissue swelling with no acute abnormality.  X-ray of the femur showed internal fixation with intact hardware and significant deformity of distal femur from remote trauma.  He was seen by ortho team of Dr. Shen, taken to surgery today 11/30 for right knee arthrotomy and removal of foreign body due to infection with cultures revealing GBS, Enterococcus and Ecoli.   He is currently on antibiotic coverage with Ampicillin, ceftriaxone and oral doxycycline     Subjective:  Lying in bed in no acute distress. No new complaints voiced. Afebrile.  Doing about the same.  Wife at the bedside    ROS  Constitutional:  Positive for malaise/fatigue.   HENT: Negative.     Respiratory: Negative.     Gastrointestinal: Negative.    Genitourinary: Negative.    Musculoskeletal: Negative.         Right knee chronic infection with draining wound   Neurological:  Positive for focal weakness and weakness.   Endo/Heme/Allergies: Negative.    Psychiatric/Behavioral: Negative.     All other Systems review done and negative    Review of patient's allergies indicates:   Allergen Reactions    Baclofen Itching and Anxiety       Past Medical History:   Diagnosis Date    Arthritis     Chronic ulcer of ankle 05/26/2022    Frequent UTI 07/02/2019    Generalized anxiety disorder 05/26/2022    Neurogenic bladder 05/26/2022    Osteomyelitis 05/26/2022    Presence of suprapubic catheter 05/26/2022    Pure hypercholesterolemia 05/26/2022    Retention of urine, unspecified 08/09/2019    Spinal cord injury at T1-T6 level 04/20/2018       Past Surgical History:   Procedure Laterality Date    FRACTURE SURGERY  2021    INCISION AND DRAINAGE, LOWER EXTREMITY Right 06/29/2023     Procedure: INCISION AND DRAINAGE, LOWER EXTREMITY;  Surgeon: Prabhu Shen DO;  Location: [facility] OR;  Service: Orthopedics;  Laterality: Right;  supine bone foam wash stuff cultures    INCISION AND DRAINAGE, LOWER EXTREMITY Right 2023    Procedure: INCISION AND DRAINAGE, LOWER EXTREMITY;  Surgeon: Prabhu Shen DO;  Location: [facility] OR;  Service: Orthopedics;  Laterality: Right;  supine any table bone foam wash stuff possible wound vac    INCISION AND DRAINAGE, LOWER EXTREMITY Right 2023    Procedure: INCISION AND DRAINAGE, LOWER EXTREMITY;  Surgeon: Prabhu Shen DO;  Location: [facility] OR;  Service: Orthopedics;  Laterality: Right;  supine bone foam vascular bed removal of distal femoral plate, wash stuff, possible AKA    INSERTION OF INTRAMEDULLARY MARISA Right 08/10/2022    Procedure: INSERTION, INTRAMEDULLARY MARISA RIGHT TIBIA;  Surgeon: Jorge Orellana MD;  Location: [facility] OR;  Service: Orthopedics;  Laterality: Right;    JOINT REPLACEMENT      Ankel    REMOVAL OF HARDWARE FROM LOWER EXTREMITY Right 2023    Procedure: REMOVAL, HARDWARE, LOWER EXTREMITY;  Surgeon: Prabhu Shen DO;  Location: [facility] OR;  Service: Orthopedics;  Laterality: Right;    SPINE SURGERY  2018       Social History     Socioeconomic History    Marital status:    Tobacco Use    Smoking status: Some Days     Current packs/day: 0.00     Average packs/day: 0.2 packs/day for 41.5 years (10.4 ttl pk-yrs)     Types: Cigars, Cigarettes     Start date: 1982     Last attempt to quit: 2023     Years since quittin.3    Smokeless tobacco: Never   Substance and Sexual Activity    Alcohol use: Not Currently     Alcohol/week: 2.0 standard drinks of alcohol     Types: 2 Cans of beer per week    Drug use: Not Currently     Types: Oxycodone    Sexual activity: Not Currently     Partners: Female     Birth control/protection: None     Social Determinants of Health     Financial Resource Strain: Low Risk  (2023)     Overall Financial Resource Strain (CARDIA)     Difficulty of Paying Living Expenses: Not very hard   Food Insecurity: No Food Insecurity (8/9/2023)    Hunger Vital Sign     Worried About Running Out of Food in the Last Year: Never true     Ran Out of Food in the Last Year: Never true   Transportation Needs: No Transportation Needs (8/9/2023)    PRAPARE - Transportation     Lack of Transportation (Medical): No     Lack of Transportation (Non-Medical): No   Physical Activity: Inactive (8/9/2023)    Exercise Vital Sign     Days of Exercise per Week: 0 days     Minutes of Exercise per Session: 0 min   Stress: No Stress Concern Present (8/9/2023)    [employer] [place] [employer] - Occupational Stress Questionnaire     Feeling of Stress : Only a little   Social Connections: Moderately Isolated (10/3/2023)    Social Connection and Isolation Panel [NHANES]     Frequency of Communication with Friends and Family: Twice a week     Frequency of Social Gatherings with Friends and Family: Twice a week     Attends Baptism Services: Never     Active Member of Clubs or Organizations: No     Attends Club or Organization Meetings: Never     Marital Status:    Housing Stability: Low Risk  (8/9/2023)    Housing Stability Vital Sign     Unable to Pay for Housing in the Last Year: No     Number of Places Lived in the Last Year: 1     Unstable Housing in the Last Year: No         Scheduled Meds:   amLODIPine  10 mg Oral Daily    ampicillin IVPB  2 g Intravenous Q4H    atorvastatin  20 mg Oral Nightly    B12-levomefolate calcium-B6  1 tablet Oral Daily    carvediloL  25 mg Oral BID WM    cefTRIAXone (ROCEPHIN) IVPB  2 g Intravenous Q24H    docusate sodium  200 mg Oral BID    doxycycline  100 mg Oral Q12H    enoxaparin  40 mg Subcutaneous Daily    ferrous sulfate  1 tablet Oral BID    FLUoxetine  20 mg Oral Daily    guaiFENesin  600 mg Oral BID    methocarbamoL  750 mg Oral Q6H    nicotine  1 patch Transdermal Daily     "oxybutynin  5 mg Oral BID    polyethylene glycol  17 g Oral Daily     Continuous Infusions:  PRN Meds:dextrose 10%, dextrose 10%, glucagon (human recombinant), glucose, glucose, hydrALAZINE, insulin aspart U-100, labetaloL, ondansetron, oxyCODONE-acetaminophen, oxyCODONE-acetaminophen, temazepam, zolpidem    Objective:  BP (!) 180/81   Pulse 72   Temp 98.1 °F (36.7 °C) (Oral)   Resp 18   Ht 5' 9" (1.753 m)   Wt 72.6 kg (160 lb)   SpO2 99%   BMI 23.63 kg/m²     Physical Exam:   Physical Exam  Vitals reviewed.   Constitutional:       General: He is not in acute distress.  HENT:      Head: Normocephalic and atraumatic.   Cardiovascular:      Rate and Rhythm: Normal rate and regular rhythm.      Heart sounds: Normal heart sounds.   Pulmonary:      Effort: Pulmonary effort is normal. No respiratory distress.      Breath sounds: Normal breath sounds.   Abdominal:      General: Bowel sounds are normal. There is no distension.      Palpations: Abdomen is soft.      Tenderness: There is no abdominal tenderness.   Genitourinary:     Comments: Stover catheter in place  Musculoskeletal:         General: No deformity.      Cervical back: Neck supple.      Comments: Contracted lower extremities   Skin:     Findings: No erythema or rash.      Comments: Right knee is dressed    Neurological:      Mental Status: He is alert and oriented to person, place, and time.   Psychiatric:      Comments: Calm and cooperative    Imaging      Lab Review   Recent Results (from the past 24 hour(s))   CBC with Differential    Collection Time: 12/05/23  4:38 AM   Result Value Ref Range    WBC 7.57 4.50 - 11.50 x10(3)/mcL    RBC 3.79 (L) 4.70 - 6.10 x10(6)/mcL    Hgb 9.4 (L) 14.0 - 18.0 g/dL    Hct 29.8 (L) 42.0 - 52.0 %    MCV 78.6 (L) 80.0 - 94.0 fL    MCH 24.8 (L) 27.0 - 31.0 pg    MCHC 31.5 (L) 33.0 - 36.0 g/dL    RDW 17.5 (H) 11.5 - 17.0 %    Platelet 520 (H) 130 - 400 x10(3)/mcL    MPV 9.2 7.4 - 10.4 fL    Neut % 47.0 %    Lymph % 42.4 % "    Mono % 8.2 %    Eos % 1.2 %    Basophil % 0.5 %    Lymph # 3.21 0.6 - 4.6 x10(3)/mcL    Neut # 3.56 2.1 - 9.2 x10(3)/mcL    Mono # 0.62 0.1 - 1.3 x10(3)/mcL    Eos # 0.09 0 - 0.9 x10(3)/mcL    Baso # 0.04 <=0.2 x10(3)/mcL    IG# 0.05 (H) 0 - 0.04 x10(3)/mcL    IG% 0.7 %    NRBC% 0.0 %       Assessment/Plan:  1.  Sepsis with leukocytosis  2.  Chronic right knee infection with septic arthritis and osteomyelitis of the femur - GBS / Enterococcus / Ecoli isolates  3. Acute kidney injury/chronic kidney disease  4.  Elevated ESR, CRP  5.  Cocaine / tobacco use  6.  Diabetes type 2  7.  Anemia   8.  Protein calorie malnutrition  9.  Paraplegia   10. Chronic MRSA L ankle/foot osteomyelitis with retained hardware     -Continue Ampicillin #5, Ceftriaxone #2 and chronic suppression with oral Doxycycline. Plan a 6 week course following inflammatory markers (End date 1/15/24)  -Will need chronic oral suppressive antibiotic therapy with ampicillin and doxycycline upon completion of IV antibiotics  -Afebrile without leukocytosis, follow  -12/2 ESR > 130 from 65 and .8 from 211.10  -Ortho on board, inputs noted  -S/P R knee arthrotomy with removal of foreign body on 11/30, cultures revealed Streptococcus agalactiae, Ecoli and Enterococcus  -S/P I&D R femur with removal of hardware and placement of antibiotic spacer  -R knee abscess culture from 11/29 revealed moderate Streptococcus agalactiae and Enterococcus  -Discussed with patient and nursing. CM to work on discharge home with HH and OPAT. He will follow up with us as an outpatient about 2 weeks post discharge

## 2023-12-05 NOTE — PROGRESS NOTES
[facility] Hospital Medicine Progress Note        Chief Complaint: Inpatient Follow-up for Rt knee septic arthritis        HPI:   58 yr old male with PMHx of  HTN, DM II,  Afib (on Xarelto), paraplegia secondary to T- spine cord injury( T1-T6), neurogenic bladder with chronic suprapubic catheter, hepatitis-C, chronic right ankle wound/osteomyelitis, presented to the ED with right knee pain with purulent drainage which apparently started this morning. He was treated for right patellar bursa abscess and S/P I&D on 6/29/23. Cultures at that time grew streptococcus agalactiae. He's on chronic suppressive therapy with oral  Doxycycline.      Vital signs on arrival: T 97.9, P 83, R 20, B/P 96/56, sats 98% on room air. Initial labs include WBC 17.28, Hgb 11.1, Hct 36.6, BUN 12.6, creatinine 1.29, ESR 65. X-ray of the right knee showed soft tissue swelling with no acute abnormality.  X-ray of the femur showed internal fixation with intact hardware and significant deformity of distal femur from remote trauma. Meds given in ED include Zosyn and Vancomycin.     Orthopedic surgery was consulted for right septic knee.  Per chart review patient has a chronic infection of the right knee. Patient has been offered above-the-knee amputation several times by Orthopedic surgery which he has declined. Infectious Disease service was consulted as well. Pt is well known to Dr. Hutchison's service. Blood cultures have been negative.  Right knee abscess culture from 11/29 showing similar bacteria moderate growth of Streptococcus agalactiae.        Pt was taken to OR  by ortho  Dr. Shen on 11/30 showed  gross purulence throughout the knee joint and hardware.  Also retained foreign bodies from sponge changes. Pt underwent  Right knee arthrotomy and removal of foreign body. Pt was taken back to OR on 12/1/23   due to the gross amount of purulence and underwent incision and drainage of right distal femur osteomyelitis  with open bone window, removal of hardware deep right femur and implant of antibiotic spacer Stimulan beads. Intraoperative culture from 11/30 grew out E.coli, GBS, Enterococcus faecalis. No anaerobes isolated. ID changed antibiotic to IV Ampicillin 2 gram q4h.          Interval Hx:   POD #3 from Rt femus hardware removal   C/O pain is not adequately controlled . He prefers to be on IV Morphine     BP remains mod elevated. Meds adjusted.   Labs showed WBC normalized, Hgb fairly stable at 8.5 after initial drop  Will monitor H/H and transfuse blood for Hgb 7 or under. Oral iron initiated.      Intraoperative culture from 11/30 grew out E.coli, GBS, Enterococcus faecalis. No anaerobes isolated. ID changed antibiotic to IV Ampicillin 2 gram q4h.   Wait further adjustment per ID      Case was discussed with patient's nurse and  on the floor.    Objective/physical exam:  General: In no acute distress, afebrile  Chest: Clear to auscultation bilaterally  Heart: RRR, +S1, S2, no appreciable murmur  Abdomen: Soft, nontender, BS +  MSK: RLE with ACE wrap dressing, ulcer to left heel and right ankle   Neurologic: Alert and oriented x4      VITAL SIGNS: 24 HRS MIN & MAX LAST   Temp  Min: 97.6 °F (36.4 °C)  Max: 98.3 °F (36.8 °C) 97.6 °F (36.4 °C)   BP  Min: 130/84  Max: 161/89 130/84   Pulse  Min: 73  Max: 86  84   Resp  Min: 16  Max: 20 16   SpO2  Min: 96 %  Max: 97 % 97 %     I have reviewed the following labs:  Recent Labs   Lab 12/02/23  0648 12/03/23  0601 12/04/23  0352   WBC 10.93 10.51 7.99   RBC 3.54* 3.45* 3.57*   HGB 8.7* 8.4* 8.9*   HCT 28.7* 27.3* 27.8*   MCV 81.1 79.1* 77.9*   MCH 24.6* 24.3* 24.9*   MCHC 30.3* 30.8* 32.0*   RDW 18.0* 17.7* 17.3*   * 441* 455*   MPV 9.5 9.6 9.1     Recent Labs   Lab 11/29/23  1257 11/29/23  1600 11/30/23  0424 12/03/23  0601   *  --  137 140   K 4.7  --  4.2 4.7   CO2 16*  --  24 30*   BUN 12.6  --  9.6 4.6*   CREATININE 1.29*  --  0.77 0.70*   CALCIUM 8.7   --  8.4 9.2   PH  --  7.390  --   --    MG  --   --   --  1.90   ALBUMIN 2.7*  --  2.3*  --    ALKPHOS 78  --  63  --    ALT 13  --  11  --    AST 22  --  16  --    BILITOT 0.4  --  0.4  --      Microbiology Results (last 7 days)       Procedure Component Value Units Date/Time    Blood Culture [[id number]]  (Normal) Collected: 11/29/23 1257    Order Status: Completed Specimen: Blood Updated: 12/04/23 1401     CULTURE, BLOOD ([facility]) No Growth at 5 days    Blood Culture [[id number]]  (Normal) Collected: 11/29/23 1257    Order Status: Completed Specimen: Blood Updated: 12/04/23 1401     CULTURE, BLOOD ([facility]) No Growth at 5 days    Wound Culture [[id number]]  (Abnormal)  (Susceptibility) Collected: 11/30/23 1431    Order Status: Completed Specimen: Abscess from Knee, Right Updated: 12/04/23 1010     Wound Culture Few Escherichia coli      Few Enterococcus faecalis      Few Streptococcus agalactiae (Group B)    Wound Culture [[id number]]  (Abnormal)  (Susceptibility) Collected: 11/29/23 1536    Order Status: Completed Specimen: Abscess from Knee, Right Updated: 12/03/23 0808     Wound Culture Moderate Streptococcus agalactiae (Group B)      Moderate Enterococcus faecalis    Anaerobic Culture [[id number]] Collected: 11/30/23 1431    Order Status: Completed Specimen: Abscess from Knee, Right Updated: 12/03/23 0713     Anaerobe Culture No Anaerobes Isolated    Gram Stain [[id number]] Collected: 11/30/23 1431    Order Status: Completed Specimen: Abscess from Knee, Right Updated: 12/01/23 0744     GRAM STAIN Rare WBC observed      No bacteria seen    Fungal Culture [[id number]] Collected: 11/30/23 1431    Order Status: Sent Specimen: Abscess from Knee, Right Updated: 11/30/23 1443    Aerobic culture [[id number]] Collected: 11/30/23 1431    Order Status: Canceled Specimen: Abscess from Knee, Right Updated: 11/30/23 1443             See below for Radiology    Scheduled Med:   amLODIPine  10 mg Oral Daily    ampicillin IVPB  2 g  Intravenous Q4H    atorvastatin  20 mg Oral Nightly    B12-levomefolate calcium-B6  1 tablet Oral Daily    carvediloL  12.5 mg Oral BID WM    docusate sodium  200 mg Oral BID    enoxaparin  40 mg Subcutaneous Daily    ferrous sulfate  1 tablet Oral BID    FLUoxetine  20 mg Oral Daily    guaiFENesin  600 mg Oral BID    methocarbamoL  750 mg Oral Q6H    nicotine  1 patch Transdermal Daily    oxybutynin  5 mg Oral BID    polyethylene glycol  17 g Oral Daily      Continuous Infusions:     PRN Meds:  dextrose 10%, dextrose 10%, glucagon (human recombinant), glucose, glucose, insulin aspart U-100, ondansetron, oxyCODONE-acetaminophen, oxyCODONE-acetaminophen, temazepam, zolpidem     Assessment/Plan:  Sepsis due to infected Right knee / Septic Arthritis, chronic Right distal femur osteomyelitis, right femur infected hardware  s/p Right knee arthrotomy and removal of foreign body ( 11/30/23) and  incision and drainage of right distal femur osteomyelitis with open bone window, removal of hardware deep right femur and implant of antibiotic spacer Stimulan beads (12/1/23)  Leukocytosis due to above, POA- Normalized   Acute blood loss anemia , post op- mod  Group B Streptococcus chronic right knee infection with septic arthritis of Rt knee  Acute kidney injury, POA- resolved   Elevated inflammatory markers -ESR, CRP  Substance abuse Cocaine / tobacco use  Chronic Microcytic / Hypochromic anemia   Diabetes type 2  Protein calorie malnutrition  Paraplegia      Hx- Frequent UTI, Gen Anxiety disorder, Neurogenic bladder, Presence of Suprapubic catheter,      Plan-   Pt is  s/p Right knee arthrotomy and removal of foreign body ( 11/30/23) and  incision and drainage of right distal femur osteomyelitis with open bone window, removal of hardware deep right femur and implant of antibiotic spacer Stimulan beads (12/1/23)     Intraoperative culture from 11/30 grew out E.coli resistant to Ampicillin,  GBS, and Enterococcus faecalis  sensitive to Ampicillin.  No anaerobes isolated.      Will wait for ID to further modify antibiotic if indicated       WBC normalized   H/H are fairly stable after a initial drop post op. Will monitor . Transfuse blood for Hgb 7 or under  Multimodal pain control   Continue other supportive treatment   ISS for diabetes  Antihypertensives adjusted for elevated BP     VTE prophylaxis: Lovenox      Patient condition:  Fair     Anticipated discharge and Disposition:     TBD         All diagnosis and differential diagnosis have been reviewed; assessment and plan has been documented; I have personally reviewed the labs and test results that are presently available; I have reviewed the patients medication list; I have reviewed the consulting providers response and recommendations. I have reviewed or attempted to review medical records based upon their availability    All of the patient's questions have been  addressed and answered. Patient's is agreeable to the above stated plan. I will continue to monitor closely and make adjustments to medical management as needed.  ___________________    Jaclyn Meier MD   12/04/2023

## 2023-12-06 LAB
POCT GLUCOSE: 105 MG/DL (ref 70–110)
POCT GLUCOSE: 105 MG/DL (ref 70–110)
POCT GLUCOSE: 79 MG/DL (ref 70–110)

## 2023-12-06 PROCEDURE — 63600175 PHARM REV CODE 636 W HCPCS: Performed by: PHYSICIAN ASSISTANT

## 2023-12-06 PROCEDURE — 25000003 PHARM REV CODE 250: Performed by: INTERNAL MEDICINE

## 2023-12-06 PROCEDURE — 25000003 PHARM REV CODE 250: Performed by: PHYSICIAN ASSISTANT

## 2023-12-06 PROCEDURE — 25000003 PHARM REV CODE 250: Performed by: NURSE PRACTITIONER

## 2023-12-06 PROCEDURE — 21400001 HC TELEMETRY ROOM

## 2023-12-06 PROCEDURE — S4991 NICOTINE PATCH NONLEGEND: HCPCS | Performed by: NURSE PRACTITIONER

## 2023-12-06 PROCEDURE — 63600175 PHARM REV CODE 636 W HCPCS: Performed by: NURSE PRACTITIONER

## 2023-12-06 RX ADMIN — AMPICILLIN SODIUM 2 G: 2 INJECTION, POWDER, FOR SOLUTION INTRAMUSCULAR; INTRAVENOUS at 10:12

## 2023-12-06 RX ADMIN — DOXYCYCLINE HYCLATE 100 MG: 100 TABLET, COATED ORAL at 09:12

## 2023-12-06 RX ADMIN — OXYCODONE AND ACETAMINOPHEN 1 TABLET: 10; 325 TABLET ORAL at 06:12

## 2023-12-06 RX ADMIN — OXYCODONE AND ACETAMINOPHEN 1 TABLET: 10; 325 TABLET ORAL at 09:12

## 2023-12-06 RX ADMIN — NICOTINE 1 PATCH: 21 PATCH, EXTENDED RELEASE TRANSDERMAL at 09:12

## 2023-12-06 RX ADMIN — ATORVASTATIN CALCIUM 20 MG: 10 TABLET, FILM COATED ORAL at 09:12

## 2023-12-06 RX ADMIN — METHOCARBAMOL 750 MG: 750 TABLET ORAL at 05:12

## 2023-12-06 RX ADMIN — FERROUS SULFATE TAB 325 MG (65 MG ELEMENTAL FE) 1 EACH: 325 (65 FE) TAB at 09:12

## 2023-12-06 RX ADMIN — AMPICILLIN SODIUM 2 G: 2 INJECTION, POWDER, FOR SOLUTION INTRAMUSCULAR; INTRAVENOUS at 05:12

## 2023-12-06 RX ADMIN — FLUOXETINE 20 MG: 20 CAPSULE ORAL at 10:12

## 2023-12-06 RX ADMIN — OXYCODONE AND ACETAMINOPHEN 1 TABLET: 10; 325 TABLET ORAL at 01:12

## 2023-12-06 RX ADMIN — OXYBUTYNIN CHLORIDE 5 MG: 5 TABLET ORAL at 09:12

## 2023-12-06 RX ADMIN — CARVEDILOL 25 MG: 12.5 TABLET, FILM COATED ORAL at 05:12

## 2023-12-06 RX ADMIN — AMLODIPINE BESYLATE 10 MG: 5 TABLET ORAL at 10:12

## 2023-12-06 RX ADMIN — METHOCARBAMOL 750 MG: 750 TABLET ORAL at 06:12

## 2023-12-06 RX ADMIN — GUAIFENESIN 600 MG: 600 TABLET, EXTENDED RELEASE ORAL at 09:12

## 2023-12-06 RX ADMIN — CARVEDILOL 25 MG: 12.5 TABLET, FILM COATED ORAL at 06:12

## 2023-12-06 RX ADMIN — AMPICILLIN SODIUM 2 G: 2 INJECTION, POWDER, FOR SOLUTION INTRAMUSCULAR; INTRAVENOUS at 06:12

## 2023-12-06 RX ADMIN — Medication 1 TABLET: at 10:12

## 2023-12-06 RX ADMIN — METHOCARBAMOL 750 MG: 750 TABLET ORAL at 11:12

## 2023-12-06 RX ADMIN — ENOXAPARIN SODIUM 40 MG: 40 INJECTION SUBCUTANEOUS at 06:12

## 2023-12-06 RX ADMIN — OXYCODONE AND ACETAMINOPHEN 1 TABLET: 10; 325 TABLET ORAL at 05:12

## 2023-12-06 RX ADMIN — OXYCODONE HYDROCHLORIDE AND ACETAMINOPHEN 1 TABLET: 5; 325 TABLET ORAL at 10:12

## 2023-12-06 RX ADMIN — CEFTRIAXONE SODIUM 2 G: 2 INJECTION, POWDER, FOR SOLUTION INTRAMUSCULAR; INTRAVENOUS at 07:12

## 2023-12-06 RX ADMIN — AMPICILLIN SODIUM 2 G: 2 INJECTION, POWDER, FOR SOLUTION INTRAMUSCULAR; INTRAVENOUS at 01:12

## 2023-12-06 RX ADMIN — DOCUSATE SODIUM 200 MG: 100 CAPSULE, LIQUID FILLED ORAL at 09:12

## 2023-12-06 RX ADMIN — OXYCODONE AND ACETAMINOPHEN 1 TABLET: 10; 325 TABLET ORAL at 02:12

## 2023-12-06 RX ADMIN — AMPICILLIN SODIUM 2 G: 2 INJECTION, POWDER, FOR SOLUTION INTRAMUSCULAR; INTRAVENOUS at 02:12

## 2023-12-06 RX ADMIN — ZOLPIDEM TARTRATE 5 MG: 5 TABLET ORAL at 09:12

## 2023-12-06 NOTE — PLAN OF CARE
12/06/23 1030   Discharge Reassessment   Assessment Type Discharge Planning Reassessment   Discharge Plan discussed with: Patient   Discharge Plan A Long-term acute care facility (LTAC)   Discharge Plan B Skilled Nursing Facility   Post-Acute Status   Post-Acute Authorization Placement   Post-Acute Placement Status Referrals Sent  ([facility])     Patient has been accepted to [facility].

## 2023-12-06 NOTE — NURSING
Arrived to place picc/ needs rocephin 45 days as reported by dr Corbin/ did timeout with Arturo URBANO at bedside/ pt states has had several piccs and have not had any recent successful placed/ including one placed by IR at [facility], [facility] even with contrast was unable to place a picc/ Spoke with Dr Corbin and we decided to try a midline but I went to speak with pt again and pt is not interested in a picc or midline/ Dr Corbin informed

## 2023-12-06 NOTE — PROGRESS NOTES
[facility] Hospital Medicine Progress Note        Chief Complaint: Inpatient Follow-up for Rt knee septic arthritis        HPI:   58 yr old male with PMHx of  HTN, DM II,  Afib (on Xarelto), paraplegia secondary to T- spine cord injury( T1-T6), neurogenic bladder with chronic suprapubic catheter, hepatitis-C, chronic right ankle wound/osteomyelitis, presented to the ED with right knee pain with purulent drainage which apparently started this morning. He was treated for right patellar bursa abscess and S/P I&D on 6/29/23. Cultures at that time grew streptococcus agalactiae. He's on chronic suppressive therapy with oral  Doxycycline.      Vital signs on arrival: T 97.9, P 83, R 20, B/P 96/56, sats 98% on room air. Initial labs include WBC 17.28, Hgb 11.1, Hct 36.6, BUN 12.6, creatinine 1.29, ESR 65. X-ray of the right knee showed soft tissue swelling with no acute abnormality.  X-ray of the femur showed internal fixation with intact hardware and significant deformity of distal femur from remote trauma. Meds given in ED include Zosyn and Vancomycin.     Orthopedic surgery was consulted for right septic knee.  Per chart review patient has a chronic infection of the right knee. Patient has been offered above-the-knee amputation several times by Orthopedic surgery which he has declined. Infectious Disease service was consulted as well. Pt is well known to Dr. Hutchison's service. Blood cultures have been negative.  Right knee abscess culture from 11/29 showing similar bacteria moderate growth of Streptococcus agalactiae.        Pt was taken to OR  by ortho  Dr. Shen on 11/30 showed  gross purulence throughout the knee joint and hardware.  Also retained foreign bodies from sponge changes. Pt underwent  Right knee arthrotomy and removal of foreign body. Pt was taken back to OR on 12/1/23   due to the gross amount of purulence and underwent incision and drainage of right distal femur osteomyelitis  with open bone window, removal of hardware deep right femur and implant of antibiotic spacer Stimulan beads. Intraoperative culture from 11/30 grew out E.coli, GBS, Enterococcus faecalis. No anaerobes isolated. ID changed antibiotic to IV Ampicillin 2 gram q4h.        Interval Hx:   Patient is resting comfortably.  Unfortunately patient refused to have a PICC or midline placed yesterday because apparently he had some issues in the past.  Patient is afebrile, room air, and hemodynamically stable. Case was discussed with  on the floor.      Objective/physical exam:  General: in no acute distress  HENT: normocephalic, atraumatic  Eye: PERRL, EOMI, clear conjunctiva  Neck: full ROM, no thyromegaly, no JVD  Respiratory: clear to auscultation bilaterally  Cardiovascular: regular rate and rhythm  Gastrointestinal: non-distended, positive bowel sounds, non-tender  Genitourinary:  Suprapubic catheter  Musculoskeletal: no gross deformity  Integumentary: warm, dry, intact, no rashes  Neurological: cranial nerves grossly intact, paraplegic  Psychiatric: cooperative, flat affect, depressed appearing      VITAL SIGNS: 24 HRS MIN & MAX LAST   Temp  Min: 97.5 °F (36.4 °C)  Max: 98.4 °F (36.9 °C) 97.9 °F (36.6 °C)   BP  Min: 116/5  Max: 166/85 (!) 161/81   Pulse  Min: 73  Max: 98  89   Resp  Min: 16  Max: 20 20   SpO2  Min: 96 %  Max: 99 % 98 %     I have reviewed the following labs:  Recent Labs   Lab 12/03/23  0601 12/04/23  0352 12/05/23  0438   WBC 10.51 7.99 7.57   RBC 3.45* 3.57* 3.79*   HGB 8.4* 8.9* 9.4*   HCT 27.3* 27.8* 29.8*   MCV 79.1* 77.9* 78.6*   MCH 24.3* 24.9* 24.8*   MCHC 30.8* 32.0* 31.5*   RDW 17.7* 17.3* 17.5*   * 455* 520*   MPV 9.6 9.1 9.2     Recent Labs   Lab 11/29/23  1600 11/30/23  0424 12/03/23  0601   NA  --  137 140   K  --  4.2 4.7   CO2  --  24 30*   BUN  --  9.6 4.6*   CREATININE  --  0.77 0.70*   CALCIUM  --  8.4 9.2   PH 7.390  --   --    MG  --   --  1.90   ALBUMIN  --  2.3*  --     ALKPHOS  --  63  --    ALT  --  11  --    AST  --  16  --    BILITOT  --  0.4  --      Microbiology Results (last 7 days)       Procedure Component Value Units Date/Time    Blood Culture [[id number]]  (Normal) Collected: 11/29/23 1257    Order Status: Completed Specimen: Blood Updated: 12/04/23 1401     CULTURE, BLOOD ([facility]) No Growth at 5 days    Blood Culture [[id number]]  (Normal) Collected: 11/29/23 1257    Order Status: Completed Specimen: Blood Updated: 12/04/23 1401     CULTURE, BLOOD ([facility]) No Growth at 5 days    Wound Culture [[id number]]  (Abnormal)  (Susceptibility) Collected: 11/30/23 1431    Order Status: Completed Specimen: Abscess from Knee, Right Updated: 12/04/23 1010     Wound Culture Few Escherichia coli      Few Enterococcus faecalis      Few Streptococcus agalactiae (Group B)    Wound Culture [[id number]]  (Abnormal)  (Susceptibility) Collected: 11/29/23 1536    Order Status: Completed Specimen: Abscess from Knee, Right Updated: 12/03/23 0808     Wound Culture Moderate Streptococcus agalactiae (Group B)      Moderate Enterococcus faecalis    Anaerobic Culture [[id number]] Collected: 11/30/23 1431    Order Status: Completed Specimen: Abscess from Knee, Right Updated: 12/03/23 0713     Anaerobe Culture No Anaerobes Isolated    Gram Stain [[id number]] Collected: 11/30/23 1431    Order Status: Completed Specimen: Abscess from Knee, Right Updated: 12/01/23 0744     GRAM STAIN Rare WBC observed      No bacteria seen    Fungal Culture [[id number]] Collected: 11/30/23 1431    Order Status: Sent Specimen: Abscess from Knee, Right Updated: 11/30/23 1443    Aerobic culture [[id number]] Collected: 11/30/23 1431    Order Status: Canceled Specimen: Abscess from Knee, Right Updated: 11/30/23 1443             See below for Radiology    Scheduled Med:   amLODIPine  10 mg Oral Daily    ampicillin IVPB  2 g Intravenous Q4H    atorvastatin  20 mg Oral Nightly    B12-levomefolate calcium-B6  1 tablet Oral Daily     carvediloL  25 mg Oral BID WM    cefTRIAXone (ROCEPHIN) IVPB  2 g Intravenous Q24H    docusate sodium  200 mg Oral BID    doxycycline  100 mg Oral Q12H    enoxaparin  40 mg Subcutaneous Daily    ferrous sulfate  1 tablet Oral BID    FLUoxetine  20 mg Oral Daily    guaiFENesin  600 mg Oral BID    methocarbamoL  750 mg Oral Q6H    nicotine  1 patch Transdermal Daily    oxybutynin  5 mg Oral BID    polyethylene glycol  17 g Oral Daily      Continuous Infusions:  None.       PRN Meds:  dextrose 10%, dextrose 10%, glucagon (human recombinant), glucose, glucose, hydrALAZINE, insulin aspart U-100, labetaloL, ondansetron, oxyCODONE-acetaminophen, oxyCODONE-acetaminophen, temazepam, zolpidem     Assessment/Plan:  Sepsis due to Right knee septic arthritis, chronic Right distal femur osteomyelitis, right femur infected hardware status post Right knee arthrotomy and removal of foreign body (11/30/23) and incision and drainage of right distal femur osteomyelitis with open bone window, removal of hardware deep right femur and implant of antibiotic spacer Stimulan beads (12/1/23)  Anemia of chronic disease  Polysubstance abuse  Non-insulin-dependent type 2 diabetes mellitus  Moderate protein calorie malnutrition  Paraplegia      History of recurrent bacterial cystitis, generalized anxiety disorder, Neurogenic bladder, Presence of Suprapubic catheter,        Plan-   Continue current antibiotic regimen as outlined by Infectious Disease  Patient refused PICC as well as midline placement.  We will have to request general surgery place a central venous catheter.  Continue appropriate home medications and other supportive care while awaiting placement    VTE prophylaxis: Lovenox      Patient condition:  Stable     Anticipated discharge and Disposition:   Long-term acute care hospital        All diagnosis and differential diagnosis have been reviewed; assessment and plan has been documented; I have personally reviewed the labs and test  results that are presently available; I have reviewed the patients medication list; I have reviewed the consulting providers response and recommendations. I have reviewed or attempted to review medical records based upon their availability    All of the patient's questions have been  addressed and answered. Patient's is agreeable to the above stated plan. I will continue to monitor closely and make adjustments to medical management as needed.  ___________________    Karri Corbin MD   12/06/2023

## 2023-12-06 NOTE — PRE ADMISSION SCREENING
[facility]    Pre-Admission Patient Screening                    Pre-Screen type:  LTAC:  Reason for Admission:    CHRONIC RIGHT KNEE SEPTIC ARTHRITIS W. ACUTE OSTEOMYELITIS    LTACH Admission Criteria:    Complex wound care for infected/necrotic skin conditions, Stage III or IV pressure injury, surgical wounds, or burns requiring at least one of the following:  Complex dressing changes at least 2 times every 24 hours  Wound debridement  Wound I&D  Wound Management requiring 24 hour observation and positioning at least every 2 hours by licensed personnel    Must meet at least one of the following concomitant treatments/intervention daily unless notes: (excludes PO meds unless notes)  IV medication per therapeutic regimen, Cardiac Monitoring, IV fludis greater than 50 cc/hr, Laboratory assessment and medication adjustment(s), and Rehab Therapy (PT/OT/ST) 1-3 hours a day greater than or equal to 5 days a week      LTACH more appropriate than other levels of care (eg, skilled nursing facility, home health care), as indicated by:    Clinical management of patient deemed too frequent and needed beyond the capabilities of alternative levels of care as evidence by: Blood glucose monitoring greater than or equal to 4 times daily requiring clinical intervention and Frequency of IV medications greater than or equal to 2 times daily  Frequent diagnostic services needed on an inpatient basis, including clinical assessments, laboratory, and imaging as evidence by: Frequent monitoring and clinical assessments performed by a licensed RN to identify current and future patient needs by incorporating the recognition of normal vs abnormal body physiology, and to prompt recognition of pertinent changes to identify and prioritize appropriate interventions that can be performed within the acute inpatient setting.  and Frequent laboratory testing and/or imaging to aide in the improvement and effectiveness of  patient's individualized treatment plan.   More intensive services, such as speciality nursing care, and/or onsite physician assessments needed that are not available at a lower level of care as evidence by: Daily physician intervention , Collaboration between consulting and attending providers still deemed a necessity to aide in the improvement and effectiveness of patient's individualized treatment plan, which can be provided at an [facility] level of care. , and Therapy Services to be included in patient's treatment plan in an effort to restore/improve patient's modality status to a safe level of functioning prior to acute illness.      Patient is stable for transfer to [facility], as indicated by ALL of the following:      Hypotension Absent     Cardiovascular status stable     Stable chest findings     Renal function accepctable   Pain adequately managed    Intake acceptable       No acute significant hepatic dysfunction (eg, new encephalopathy)   No acute severe unstable neurologic abnormalities (eg, Altered mental status that is severe or persistent, or evidence of ongoing CNS embolization or ischemia, worsening hydrocephalus)   No active bleeding or unstable disorders of hemostasis (eg, no recent need for transfusion, severe thrombocytopenia with bleeding)   No need for respiratory or other isolation, OR manageable at LTACH level of care    Long-term enteral feeding (eg, PEG) and intravenous access established, not needed, OR to be placed at [facility] level of care      Anticipated Discharge Disposition:    Home with caregiver support    Facility Status: Accept     Referring Physician:  DR. QUIANA IQBAL     Admitting Physician:  Francisco Perry MD    Primary Care Physician:  Areli Vargas PA-C    History         Patient Active Problem List    Diagnosis Date Noted    Chronic infection of right knee 11/30/2023    Pain and swelling of knee, right 11/30/2023    Acute respiratory failure with hypoxemia  08/09/2023    Abscess of bursa of right knee 06/28/2023    Closed displaced spiral fracture of shaft of right tibia 08/10/2022    Neurogenic bladder 05/26/2022    Anemia 05/26/2022    Chronic ulcer of ankle 05/26/2022    Fracture of distal end of tibia 05/26/2022    Gastroesophageal reflux disease without esophagitis 05/26/2022    Generalized anxiety disorder 05/26/2022    Osteomyelitis 05/26/2022    Presence of suprapubic catheter 05/26/2022    Pure hypercholesterolemia 05/26/2022    Chronic pain 08/09/2019    Hypertension 08/09/2019    Type 2 diabetes mellitus 08/09/2019    Frequent UTI 07/02/2019    Dysphagia 04/20/2018    Spinal cord injury at T1-T6 level 04/20/2018         Previous Specialties/Consulted physicians:      General Surgery , Infectious Diseases , Wound Care , and Other: ORTHO      Past and Current Medical History    Past Medical History:   Diagnosis Date    Arthritis     Chronic ulcer of ankle 05/26/2022    Frequent UTI 07/02/2019    Generalized anxiety disorder 05/26/2022    Neurogenic bladder 05/26/2022    Osteomyelitis 05/26/2022    Presence of suprapubic catheter 05/26/2022    Pure hypercholesterolemia 05/26/2022    Retention of urine, unspecified 08/09/2019    Spinal cord injury at T1-T6 level 04/20/2018           History of Present Illness     HPI:   Patient is a 58-year-old  male with PMHx of cocaine abuse, HTN, DM II,  Afib (on Xarelto), paraplegia secondary to T- spine cord injury( T1-T6), neurogenic bladder with chronic suprapubic catheter, hepatitis-C, chronic right ankle wound/osteomyelitis, presented to the ED with right knee pain with purulent drainage which apparently started this morning. He was treated for right patellar bursa abscess and S/P I&D on 6/29/23. Cultures at that time grew streptococcus agalactiae. He's on chronic suppressive therapy with oral doxycycline.      Vital signs on arrival: T 97.9, P 83, R 20, B/P 96/56, sats 98% on room air. Initial labs  include WBC 17.28, Hgb 11.1, Hct 36.6, BUN 12.6, creatinine 1.29, ESR 65. X-ray of the right knee showed soft tissue swelling with no acute abnormality.  X-ray of the femur showed internal fixation with intact hardware and significant deformity of distal femur from remote trauma. Meds given in ED include Zosyn and Vancomycin.     Orthopedic surgery was consulted for right septic knee.  Per chart review patient has a chronic infection of the right knee. Patient has been offered above-the-knee amputation several times by Orthopedic surgery which he has declined. Infectious Disease service was consulted as well. Pt is well known to Dr. Hutchison's service. Blood cultures have been negative.  Right knee abscess culture from 11/29 showing similar bacteria moderate growth of Streptococcus agalactiae.        Pt was taken to operating room by orthopedics Dr. Shen on 11/30 revealing gross purulence throughout the knee joint and hardware.  Also retained foreign bodies from sponge changes.  Patient underwent right knee arthrotomy and removal of foreign body.  Patient was taken back to operating room on 12/1/23 due to the gross amount of purulence and underwent incision and drainage of right distal femur osteomyelitis with open bone window, removal of hardware deep right femur and implant of antibiotic spacer Stimulan beads. Intraoperative culture from 11/30 grew out E.coli, GBS, Enterococcus faecalis. No anaerobes isolated.  Infectious Disease changed antibiotics to IV Ampicillin 2 gram q4h.         Interval Hx:   Patient is sitting up in a wheelchair.  He has a couple of visitors present.  No new issues have been reported.  Patient is afebrile, room air, and hemodynamically stable. Case was discussed with  on the floor.  Patient has been accepted to long-term acute care pending central venous access placement.    Assessment/Plan:  Sepsis due to Right knee septic arthritis, chronic Right distal femur osteomyelitis, right  femur infected hardware status post Right knee arthrotomy and removal of foreign body (11/30/23) and incision and drainage of right distal femur osteomyelitis with open bone window, removal of hardware deep right femur and implant of antibiotic spacer Stimulan beads (12/1/23)  Anemia of chronic disease  Cocaine abuse  Non-insulin-dependent type 2 diabetes mellitus  Moderate protein calorie malnutrition  Paraplegia      History of recurrent bacterial cystitis, generalized anxiety disorder, Neurogenic bladder, Presence of Suprapubic catheter,         Plan:  Patient refused PICC line placement.  Patient refused midline placement.  Apparently general surgery discouraged the patient from having a temporary central venous catheter placed.  Interventional Radiology was consulted and they have refused to place a central venous catheter.  Now we have consulted Vascular Surgery for assistance.  I spoke with Dr. Yassine Agosto with vascular surgery today and he evaluated the patient and apparently has convinced the patient to re-attempt PICC line placement.  If this is unsuccessful then he will attempt to place another form of central venous access.  We greatly appreciate his assistance.       Continue current antibiotic regimen as outlined by Infectious Disease     Continue appropriate home medications and other supportive care while awaiting placement     VTE prophylaxis: Lovenox      Patient condition:  Stable     Anticipated discharge and Disposition:   Long-term acute care hospital after central venous access is obtained        All diagnosis and differential diagnosis have been reviewed; assessment and plan has been documented; I have personally reviewed the labs and test results that are presently available; I have reviewed the patients medication list; I have reviewed the consulting providers response and recommendations. I have reviewed or attempted to review medical records based upon their availability     All of the  patient's questions have been  addressed and answered. Patient's is agreeable to the above stated plan. I will continue to monitor closely and make adjustments to medical management as needed.  ___________________     Karri Corbin MD     Patient Traveled outside of the [place] in the last 3 months? no     Patient discharged from this LTAC to SNF within the last 45 days? no    Patient discharged from this LTAC to Rehab within the last 27 days? no    Prior residence: home    Prior Post-Acute Services: N/A     Allergies:   Review of patient's allergies indicates:   Allergen Reactions    Baclofen Itching and Anxiety       Has patient received the current influenza vaccine (Oct 1 - March 31)? Unknown     Has patient received PPD skin test prior to admit? No    Code Status: Prior    Orientation: Time, Place, Person, and Events    Speech: normal     Vital Signs:   VITAL SIGNS: 24 HRS MIN & MAX LAST   Temp  Min: 97.9 °F (36.6 °C)  Max: 98.9 °F (37.2 °C) 97.9 °F (36.6 °C)   BP  Min: 100/63  Max: 178/90 100/63   Pulse  Min: 74  Max: 92  90   Resp  Min: 16  Max: 20 16   SpO2  Min: 94 %  Max: 99 % 96 %      Date     Blood Pressure     Pulse     Respiratory Rate     O2 Saturation     Temperature         Bowel/Bladder: continent of bladder  Bowel/Bladder Appliance: N/A    Dialysis: N/A    PICC Double Lumen 12/08/23 1248 right basilic;right brachial (Active)   $ PICC Line Charges (Upon insertion) Bedside Insertion Performed Pt > 5 Years Old (no subq port/pump or image guidance);Catheter - Double Lumen (Supply) 12/08/23 1337   Number of days: 0            Peripheral IV - Single Lumen 11/29/23 1556 18 G Left Upper Arm (Active)   Site Assessment Clean;Dry;Intact 12/08/23 0800   Extremity Assessment Distal to IV No abnormal discoloration 12/08/23 0800   Line Status Capped;Flushed;Saline locked 12/08/23 0800   Dressing Status Clean;Dry;Intact 12/08/23 0800   Dressing Intervention Integrity maintained 12/08/23 0600   Number of days:  8            Suprapubic Catheter 09/22/23 0005 20 Fr. (Active)   Clamp Status unclamped 12/08/23 0800   Dressing no dressing 12/08/23 0800   Characteristics no redness;no warmth;no drainage;no swelling;no tenderness 12/08/23 0800   Drainage clear drainage 12/08/23 0800   Urine Color Yellow 12/08/23 0800   Collection Container Standard drainage bag 12/08/23 0800   Securement secured to upper leg w/ leg strap 12/08/23 0800   Output (mL) 200 mL 12/07/23 0600   Number of days: 77       CBGs/Accuchecks: Yes     Precautions: Fall    Restraints: No     Isolation Precautions: N/A       Facility-Administered Medications as of 12/8/2023   Medication Dose Route Frequency Provider Last Rate Last Admin    [COMPLETED] acetaminophen 1,000 mg/100 mL (10 mg/mL) injection 1,000 mg  1,000 mg Intravenous Once Pierre Dennis MD   Stopped at 11/30/23 1528    amLODIPine tablet 10 mg  10 mg Oral Daily Jaclyn Meier MD   10 mg at 12/08/23 0941    ampicillin (OMNIPEN) 2 g in sodium chloride 0.9 % 100 mL IVPB (MB+)  2 g Intravenous Q4H Magdi Long FNP   Stopped at 12/08/23 1128    atorvastatin tablet 20 mg  20 mg Oral Nightly Hermila Cohen, FNP   20 mg at 12/07/23 2046    B12-levomefolate calcium-B6 (FOLBIC RF) 2-1.13-25 mg tablet 1 tablet  1 tablet Oral Daily Jaclyn Meier MD   1 tablet at 12/08/23 0941    carvediloL tablet 25 mg  25 mg Oral BID  Karri Corbin MD   25 mg at 12/08/23 0941    [COMPLETED] cefazolin (ANCEF) 2 gram in dextrose 5% 50 mL IVPB (premix)  2 g Intravenous Q8H Ada Alexandra PA-C   Stopped at 12/01/23 0930    cefTRIAXone (ROCEPHIN) 2 g in dextrose 5 % in water (D5W) 100 mL IVPB (MB+)  2 g Intravenous Q24H Magdi Long FNP   Stopped at 12/07/23 1943    dextrose 10% bolus 125 mL 125 mL  12.5 g Intravenous PRN Delmy Hawk AGACNP-BC        dextrose 10% bolus 250 mL 250 mL  25 g Intravenous PRN Delmy Hawk AGACNP-BC        docusate sodium capsule 200 mg  200 mg Oral  BID Jaclyn Meier MD   200 mg at 23 0950    doxycycline tablet 100 mg  100 mg Oral Q12H Magdi Long FNP   100 mg at 23 0943    enoxaparin injection 40 mg  40 mg Subcutaneous Daily Ada Alexandra PA-C   40 mg at 23 1728    [] fentaNYL (SUBLIMAZE) 50 mcg/mL injection             [COMPLETED] fentaNYL injection 100 mcg  100 mcg Intravenous On Call Procedure Polina Aguirre MD   100 mcg at 23 1019    ferrous sulfate tablet 1 each  1 tablet Oral BID Jaclyn Meier MD   1 each at 23 0941    FLUoxetine capsule 20 mg  20 mg Oral Daily Hermila Cohen FNP   20 mg at 23 0941    glucagon (human recombinant) injection 1 mg  1 mg Intramuscular PRN Delmy Hawk AGACNP-BC        glucose chewable tablet 16 g  16 g Oral PRN Delmy Hawk AGACNP-BC        glucose chewable tablet 24 g  24 g Oral PRN Delmy Hawk AGACNP-BC        guaiFENesin 12 hr tablet 600 mg  600 mg Oral BID Vivian Smith AGACNP-BC   600 mg at 23 0943    hydrALAZINE injection 20 mg  20 mg Intravenous Q2H PRN Karri Corbin MD   20 mg at 23 1215    hydrALAZINE tablet 50 mg  50 mg Oral Q8H Karri Corbin MD        insulin aspart U-100 injection 1-10 Units  1-10 Units Subcutaneous QID (AC + HS) PRN Delmy Hawk AGACNHAILEY-BC   2 Units at 23 1256    labetaloL injection 20 mg  20 mg Intravenous Q2H PRN Karri Corbin MD        [COMPLETED] lactated ringers bolus 1,000 mL  1,000 mL Intravenous Once Ryanne Cross FNP   Stopped at 23 1430    [COMPLETED] lactated ringers bolus 2,178 mL  30 mL/kg Intravenous Once Nicolas Baca III, MD   Stopped at 23 1830    methocarbamoL tablet 750 mg  750 mg Oral Q6H Vivian Smith AGACNP-BC   750 mg at 23 1150    nicotine 21 mg/24 hr 1 patch  1 patch Transdermal Daily Hermila Cohen, FNP   1 patch at 23 0943    ondansetron injection 4 mg  4 mg Intravenous Q6H PRN Ada Alexandra,  REGINALDO        oxybutynin tablet 5 mg  5 mg Oral BID Hermila Cohen FNP   5 mg at 23 0943    oxyCODONE-acetaminophen  mg per tablet 1 tablet  1 tablet Oral Q4H PRN Jaclyn Meier MD   1 tablet at 23 0941    oxyCODONE-acetaminophen 5-325 mg per tablet 1 tablet  1 tablet Oral Q6H PRN Vivian Smith AGACNP-BC   1 tablet at 23 1034    [COMPLETED] piperacillin-tazobactam (ZOSYN) 4.5 g in dextrose 5 % in water (D5W) 100 mL IVPB (MB+)  4.5 g Intravenous ED 1 Time Nicolas Baca III, MD   Stopped at 23 1633    [] polyethylene glycol packet 17 g  17 g Oral Daily Ada Alexandra PA-C        sodium chloride 0.9% flush 10 mL  10 mL Intravenous Q6H Karri Corbin MD        And    sodium chloride 0.9% flush 10 mL  10 mL Intravenous PRN Karri Corbin MD        temazepam capsule 30 mg  30 mg Oral Nightly PRN Hermila Cohen FNP   30 mg at 23 2111    [COMPLETED] vancomycin (VANCOCIN) 500 mg in dextrose 5 % in water (D5W) 100 mL IVPB (MB+)  500 mg Intravenous Once Francisco Perry MD   Stopped at 23 0300    [COMPLETED] vancomycin 1.5 g in dextrose 5 % 250 mL IVPB (ready to mix)  1,500 mg Intravenous Once Nicolas Baca III, MD   Stopped at 23 1934    zolpidem tablet 5 mg  5 mg Oral Nightly PRN Hermila Cohen FNP   5 mg at 23 204     Outpatient Medications as of 2023   Medication Sig Dispense Refill    cloNIDine (CATAPRES) 0.1 MG tablet Take 1 tablet (0.1 mg total) by mouth 3 (three) times daily. 90 tablet 0    doxycycline (VIBRA-TABS) 100 MG tablet Take 1 tablet (100 mg total) by mouth every 12 (twelve) hours. 60 tablet 0    famotidine (PEPCID) 20 MG tablet Take 1 tablet (20 mg total) by mouth 2 (two) times daily. 60 tablet 0    hydrALAZINE (APRESOLINE) 100 MG tablet 1 tablet (100 mg total) by Per G Tube route every 8 (eight) hours. (Patient taking differently: Take 100 mg by mouth every 8 (eight) hours.) 90 tablet 0     "hydrOXYzine pamoate (VISTARIL) 50 MG Cap Take 1 capsule (50 mg total) by mouth every evening. 30 capsule 0    lisinopriL (PRINIVIL,ZESTRIL) 20 MG tablet Take 1 tablet (20 mg total) by mouth once daily. 90 tablet 0    oxybutynin (DITROPAN) 5 MG Tab 1 tablet (5 mg total) by Per NG tube route 2 (two) times daily. (Patient taking differently: Take 5 mg by mouth 2 (two) times daily.) 60 tablet 0    oxyCODONE-acetaminophen (PERCOCET)  mg per tablet Take 1 tablet by mouth every 6 (six) hours as needed for Pain. 12 tablet 0    traZODone (DESYREL) 100 MG tablet 1 tablet (100 mg total) by Per G Tube route every evening. (Patient taking differently: Take 200 mg by mouth nightly as needed.) 30 tablet 0    amLODIPine (NORVASC) 10 MG tablet Take 1 tablet (10 mg total) by mouth once daily. 30 tablet 0    atorvastatin (LIPITOR) 20 MG tablet Take 1 tablet (20 mg total) by mouth nightly. 30 tablet 0    docusate sodium (COLACE) 100 MG capsule Take 1 capsule (100 mg total) by mouth 2 (two) times daily. 30 capsule 0    FLUoxetine 20 MG capsule 1 capsule (20 mg total) by Per G Tube route once daily. (Patient taking differently: Take 20 mg by mouth once daily.) 30 capsule 0        Cardiovascular:    Cardiovascular Review: Within definable limits (WDL)    Telemetry: Yes    Rhythm:  NSR    AICD: No      Respiratory:    Oxygen: N/A    CPT/Frequency: N/A    Incentive Spirometer/Frequency: N/A    CPAP/Settings: N/A    BiPAP/Settings: N/A    Oxygen Saturation: N/A    Suction Frequency: N/A      Vent Settings:   Mode:   Rate:   TV:   FIO2:   PEEP:   PS:   iTime:    Trial/HS Settings:   Mode:   Rate:   TV:   FIO2:   PEEP:   PS:       Nutrition:      Ht Readings from Last 3 Encounters:   11/30/23 5' 9" (1.753 m)   10/24/23 5' 9" (1.753 m)   08/09/23 5' 9" (1.753 m)     Wt Readings from Last 1 Encounters:   11/30/23 1540 72.6 kg (160 lb)   11/29/23 1211 72.6 kg (160 lb)       Feeding Status:   Current Diet: ADULT DIABETIC DIET "    Supplements:N/A   Tube Feeding: N/A   Flushes: N/A      Integumentary:    Integumentary: Other: SEE BELOW               Altered Skin Integrity 06/28/23 2230 Right lateral Ankle #6 (Active)   06/28/23 2230   Altered Skin Integrity Present on Admission - Did Patient arrive to the hospital with altered skin?: yes   Side: Right   Orientation: lateral   Location: Ankle   Wound Number: #6   Is this injury device related?: No   Primary Wound Type:    Description of Altered Skin Integrity:    Ankle-Brachial Index:    Pulses:    Removal Indication and Assessment:    Wound Outcome:    (Retired) Wound Length (cm):    (Retired) Wound Width (cm):    (Retired) Depth (cm):    Wound Description (Comments):    Removal Indications:    Wound Image   12/03/23 1352   Description of Altered Skin Integrity Full thickness tissue loss with exposed bone, tendon, or muscle. Often includes undermining and tunneling. May extend into muscle and/or supporting structures. 12/07/23 2000   Dressing Appearance Dry;Intact;Clean 12/07/23 2000   Drainage Amount None 12/07/23 2000   Drainage Characteristics/Odor Serous 12/07/23 2000   Appearance Pink 12/07/23 2000   Tissue loss description Full thickness 12/07/23 2000   Periwound Area Intact 12/06/23 2000   Wound Edges Callused 12/06/23 2000   Wound Length (cm) 2.2 cm 11/30/23 1240   Wound Width (cm) 1.4 cm 11/30/23 1240   Wound Depth (cm) 0.2 cm 11/30/23 1240   Wound Volume (cm^3) 0.616 cm^3 11/30/23 1240   Wound Surface Area (cm^2) 3.08 cm^2 11/30/23 1240   Undermining (depth (cm)/location) 1.0cm @ 9-12 o';clock 11/30/23 1240   Care Cleansed with:;Sterile normal saline 12/07/23 2000   Dressing Changed;Absorptive Pad;Rolled gauze 12/07/23 2000   Number of days: 163            Altered Skin Integrity 09/06/23 1259 Right Greater trochanter (Active)   09/06/23 1259   Altered Skin Integrity Present on Admission - Did Patient arrive to the hospital with altered skin?: suspected hospital acquired   Side:  Right   Orientation:    Location: Greater trochanter   Wound Number:    Is this injury device related?:    Primary Wound Type:    Description of Altered Skin Integrity:    Ankle-Brachial Index:    Pulses:    Removal Indication and Assessment:    Wound Outcome:    (Retired) Wound Length (cm):    (Retired) Wound Width (cm):    (Retired) Depth (cm):    Wound Description (Comments):    Removal Indications:    Number of days: 93            Altered Skin Integrity 09/27/23 1527 Left Heel (Active)   09/27/23 1527   Altered Skin Integrity Present on Admission - Did Patient arrive to the hospital with altered skin?: yes   Side: Left   Orientation:    Location: Heel   Wound Number:    Is this injury device related?:    Primary Wound Type:    Description of Altered Skin Integrity:    Ankle-Brachial Index:    Pulses:    Removal Indication and Assessment:    Wound Outcome:    (Retired) Wound Length (cm):    (Retired) Wound Width (cm):    (Retired) Depth (cm):    Wound Description (Comments):    Removal Indications:    Wound Image   11/30/23 1240   Description of Altered Skin Integrity Full thickness tissue loss. Base is covered by slough and/or eschar in the wound bed 12/07/23 2000   Dressing Appearance Dry;Intact;Clean 12/07/23 2000   Drainage Amount Scant 12/07/23 2000   Drainage Characteristics/Odor Serous 12/07/23 2000   Appearance Pink;Red 12/07/23 2000   Tissue loss description Full thickness 12/07/23 2000   Periwound Area Scar tissue 12/04/23 2245   Wound Edges Defined 12/06/23 2000   Wound Length (cm) 1.5 cm 11/30/23 1240   Wound Width (cm) 0.7 cm 11/30/23 1240   Wound Depth (cm) 0.2 cm 11/30/23 1240   Wound Volume (cm^3) 0.21 cm^3 11/30/23 1240   Wound Surface Area (cm^2) 1.05 cm^2 11/30/23 1240   Care Cleansed with:;Sterile normal saline 12/07/23 2000   Dressing Changed;Absorptive Pad;Rolled gauze 12/07/23 2000   Number of days: 72            Altered Skin Integrity 11/30/23 1240 Right Buttocks Full thickness tissue  loss. Subcutaneous fat may be visible but bone, tendon or muscle are not exposed (Active)   11/30/23 1240   Altered Skin Integrity Present on Admission - Did Patient arrive to the hospital with altered skin?: yes   Side: Right   Orientation:    Location: Buttocks   Wound Number:    Is this injury device related?: No   Primary Wound Type:    Description of Altered Skin Integrity: Full thickness tissue loss. Subcutaneous fat may be visible but bone, tendon or muscle are not exposed   Ankle-Brachial Index:    Pulses:    Removal Indication and Assessment:    Wound Outcome:    (Retired) Wound Length (cm):    (Retired) Wound Width (cm):    (Retired) Depth (cm):    Wound Description (Comments):    Removal Indications:    Wound Image   11/30/23 1240   Description of Altered Skin Integrity Full thickness tissue loss. Subcutaneous fat may be visible but bone, tendon or muscle are not exposed 12/07/23 2000   Dressing Appearance Dry;Intact;Clean 12/07/23 2000   Drainage Amount Scant 12/07/23 2000   Drainage Characteristics/Odor Serous 12/07/23 2000   Appearance Pink;Red 12/07/23 2000   Tissue loss description Full thickness 12/07/23 2000   Periwound Area Intact 12/06/23 2000   Wound Edges Defined 12/06/23 2000   Wound Length (cm) 3 cm 11/30/23 1240   Wound Width (cm) 2 cm 11/30/23 1240   Wound Depth (cm) 0.2 cm 11/30/23 1240   Wound Volume (cm^3) 1.2 cm^3 11/30/23 1240   Wound Surface Area (cm^2) 6 cm^2 11/30/23 1240   Care Cleansed with:;Sterile normal saline 12/07/23 2000   Dressing Changed;Silver 12/07/23 2000   Dressing Change Due 12/02/23 12/01/23 1723   Number of days: 8            Incision/Site 12/01/23 1250 Right Leg (Active)   12/01/23 1250   Present Prior to Hospital Arrival?:    Side: Right   Location: Leg   Orientation:    Incision Type:    Closure Method:    Additional Comments:    Removal Indication and Assessment:    Wound Outcome:    Removal Indications:    Wound Image   12/07/23 2000   Incision WDL WDL 12/07/23  2000   Dressing Appearance Dry;Intact;Clean 12/07/23 2000   Drainage Amount Small 12/07/23 2000   Appearance Intact;Pink;Red;Staples intact 12/07/23 2000   Periwound Area Intact;Dry 12/06/23 2000   Wound Edges Approximated 12/06/23 2000   Care Cleansed with:;Sterile normal saline 12/07/23 2000   Dressing Gauze;Non-adherent;Elastic bandage;Cast padding 12/07/23 2000   Number of days: 7         Musculoskeletal:    Transfer assist: Activity did not occur    Weight Bearing Status: N/A    Comments:  H/O BLE PARAPLEGIA     ADL Assist: Activity did not occur    Special Equipment: N/A    Radiology:    Radiology (Last 168 hours)               12/08 1343 X-Ray Chest 1 View for Line/Tube Placement       12/08 1100 CARDIAC MONITORING STRIPS     12/08 0630 CARDIAC MONITORING STRIPS     12/08 0350 CARDIAC MONITORING STRIPS     12/08 0000 CARDIAC MONITORING STRIPS     12/07 1900 CARDIAC MONITORING STRIPS     12/07 1453 CARDIAC MONITORING STRIPS     12/07 1138 CARDIAC MONITORING STRIPS     12/07 0707 CARDIAC MONITORING STRIPS     12/06 2323 CARDIAC MONITORING STRIPS     12/06 1947 CARDIAC MONITORING STRIPS     12/06 1524 CARDIAC MONITORING STRIPS     12/06 0657 CARDIAC MONITORING STRIPS     12/06 0326 CARDIAC MONITORING STRIPS     12/05 2331 CARDIAC MONITORING STRIPS     12/05 1900 CARDIAC MONITORING STRIPS     12/05 1522 CARDIAC MONITORING STRIPS     12/05 0309 CARDIAC MONITORING STRIPS     12/04 2241 CARDIAC MONITORING STRIPS     12/04 1833 CARDIAC MONITORING STRIPS     12/04 1539 CARDIAC MONITORING STRIPS     12/04 1123 CARDIAC MONITORING STRIPS     12/04 0800 CARDIAC MONITORING STRIPS     12/04 0310 CARDIAC MONITORING STRIPS     12/03 2257 CARDIAC MONITORING STRIPS     12/03 1913 CARDIAC MONITORING STRIPS     12/03 1452 CARDIAC MONITORING STRIPS     12/03 1452 CARDIAC MONITORING STRIPS     12/03 1044 CARDIAC MONITORING STRIPS     12/03 0718 CARDIAC MONITORING STRIPS     12/03 0717 CARDIAC MONITORING STRIPS     12/03 0439  CARDIAC MONITORING STRIPS     12/03 0439 CARDIAC MONITORING STRIPS     12/02 2350 CARDIAC MONITORING STRIPS     12/02 1441 CARDIAC MONITORING STRIPS     12/02 1040 CARDIAC MONITORING STRIPS     12/02 0816 CARDIAC MONITORING STRIPS     12/02 0328 CARDIAC MONITORING STRIPS     12/01 1856 CARDIAC MONITORING STRIPS     12/01 1416 CARDIAC MONITORING STRIPS              SURG FL Surgery Fluoro Usage  See OP Notes for results.     IMPRESSION: See OP Notes for results.     This procedure was auto-finalized by: Virtual Radiologist      Lab/Cultures:  Recent Labs   Lab 12/03/23  0601 12/04/23  0352 12/05/23  0438   WBC 10.51 7.99 7.57   RBC 3.45* 3.57* 3.79*   HGB 8.4* 8.9* 9.4*   HCT 27.3* 27.8* 29.8*   MCV 79.1* 77.9* 78.6*   MCH 24.3* 24.9* 24.8*   MCHC 30.8* 32.0* 31.5*   RDW 17.7* 17.3* 17.5*   * 455* 520*   MPV 9.6 9.1 9.2          Recent Labs   Lab 12/03/23  0601      K 4.7   CO2 30*   BUN 4.6*   CREATININE 0.70*   CALCIUM 9.2   MG 1.90      Microbiology Results (last 7 days)         Procedure Component Value Units Date/Time     Blood Culture [[id number]]  (Normal) Collected: 11/29/23 1257     Order Status: Completed Specimen: Blood Updated: 12/04/23 1401       CULTURE, BLOOD ([facility]) No Growth at 5 days     Blood Culture [[id number]]  (Normal) Collected: 11/29/23 1257     Order Status: Completed Specimen: Blood Updated: 12/04/23 1401       CULTURE, BLOOD ([facility]) No Growth at 5 days     Wound Culture [[id number]]  (Abnormal)  (Susceptibility) Collected: 11/30/23 1431     Order Status: Completed Specimen: Abscess from Knee, Right Updated: 12/04/23 1010       Wound Culture Few Escherichia coli         Few Enterococcus faecalis         Few Streptococcus agalactiae (Group B)     Wound Culture [[id number]]  (Abnormal)  (Susceptibility) Collected: 11/29/23 1536     Order Status: Completed Specimen: Abscess from Knee, Right Updated: 12/03/23 0808       Wound Culture Moderate Streptococcus agalactiae (Group B)          "Moderate Enterococcus faecalis     Anaerobic Culture [[id number]] Collected: 11/30/23 1431     Order Status: Completed Specimen: Abscess from Knee, Right Updated: 12/03/23 0713       Anaerobe Culture No Anaerobes Isolated      Blood Urea Nitrogen   Date Value Ref Range Status   12/03/2023 4.6 (L) 8.4 - 25.7 mg/dL Final   11/30/2023 9.6 8.4 - 25.7 mg/dL Final   05/11/2022 17 7 - 18 mg/dL Final   09/30/2021 13 5 - 25 mg/dL Final     Creatinine   Date Value Ref Range Status   12/03/2023 0.70 (L) 0.73 - 1.18 mg/dL Final   11/30/2023 0.77 0.73 - 1.18 mg/dL Final   05/11/2022 0.84 0.57 - 1.25 mg/dL Final   09/30/2021 0.64 0.57 - 1.25 mg/dL Final     WBC   Date Value Ref Range Status   12/05/2023 7.57 4.50 - 11.50 x10(3)/mcL Final   12/04/2023 7.99 4.50 - 11.50 x10(3)/mcL Final      CULTURE, BLOOD ([facility])   Date Value Ref Range Status   11/29/2023 No Growth at 5 days  Final   11/29/2023 No Growth at 5 days  Final     Urine Culture   Date Value Ref Range Status   09/21/2023 (A)  Final    >/= 100,000 colonies/ml Enterobacter cloacae complex   09/21/2023 10,000 - 25,000 colonies/ml Candida albicans (A)  Final     Wound Culture   Date Value Ref Range Status   11/30/2023 Few Escherichia coli (A)  Final   11/30/2023 Few Enterococcus faecalis (A)  Final   11/30/2023 Few Streptococcus agalactiae (Group B) (A)  Final     No results for input(s): "PH", "PCO2", "PO2", "HCO3", "POCSATURATED", "BE" in the last 72 hours.       "

## 2023-12-07 LAB — POCT GLUCOSE: 104 MG/DL (ref 70–110)

## 2023-12-07 PROCEDURE — 25000003 PHARM REV CODE 250: Performed by: INTERNAL MEDICINE

## 2023-12-07 PROCEDURE — 25000003 PHARM REV CODE 250: Performed by: NURSE PRACTITIONER

## 2023-12-07 PROCEDURE — 21400001 HC TELEMETRY ROOM

## 2023-12-07 PROCEDURE — 63600175 PHARM REV CODE 636 W HCPCS: Performed by: PHYSICIAN ASSISTANT

## 2023-12-07 PROCEDURE — 63600175 PHARM REV CODE 636 W HCPCS: Performed by: NURSE PRACTITIONER

## 2023-12-07 PROCEDURE — S4991 NICOTINE PATCH NONLEGEND: HCPCS | Performed by: NURSE PRACTITIONER

## 2023-12-07 RX ADMIN — METHOCARBAMOL 750 MG: 750 TABLET ORAL at 11:12

## 2023-12-07 RX ADMIN — GUAIFENESIN 600 MG: 600 TABLET, EXTENDED RELEASE ORAL at 08:12

## 2023-12-07 RX ADMIN — OXYCODONE AND ACETAMINOPHEN 1 TABLET: 10; 325 TABLET ORAL at 06:12

## 2023-12-07 RX ADMIN — AMPICILLIN SODIUM 2 G: 2 INJECTION, POWDER, FOR SOLUTION INTRAMUSCULAR; INTRAVENOUS at 02:12

## 2023-12-07 RX ADMIN — AMPICILLIN SODIUM 2 G: 2 INJECTION, POWDER, FOR SOLUTION INTRAMUSCULAR; INTRAVENOUS at 05:12

## 2023-12-07 RX ADMIN — CARVEDILOL 25 MG: 12.5 TABLET, FILM COATED ORAL at 05:12

## 2023-12-07 RX ADMIN — FERROUS SULFATE TAB 325 MG (65 MG ELEMENTAL FE) 1 EACH: 325 (65 FE) TAB at 08:12

## 2023-12-07 RX ADMIN — OXYCODONE AND ACETAMINOPHEN 1 TABLET: 10; 325 TABLET ORAL at 08:12

## 2023-12-07 RX ADMIN — AMPICILLIN SODIUM 2 G: 2 INJECTION, POWDER, FOR SOLUTION INTRAMUSCULAR; INTRAVENOUS at 01:12

## 2023-12-07 RX ADMIN — METHOCARBAMOL 750 MG: 750 TABLET ORAL at 05:12

## 2023-12-07 RX ADMIN — ENOXAPARIN SODIUM 40 MG: 40 INJECTION SUBCUTANEOUS at 05:12

## 2023-12-07 RX ADMIN — DOXYCYCLINE HYCLATE 100 MG: 100 TABLET, COATED ORAL at 08:12

## 2023-12-07 RX ADMIN — FLUOXETINE 20 MG: 20 CAPSULE ORAL at 08:12

## 2023-12-07 RX ADMIN — AMPICILLIN SODIUM 2 G: 2 INJECTION, POWDER, FOR SOLUTION INTRAMUSCULAR; INTRAVENOUS at 09:12

## 2023-12-07 RX ADMIN — ZOLPIDEM TARTRATE 5 MG: 5 TABLET ORAL at 08:12

## 2023-12-07 RX ADMIN — Medication 1 TABLET: at 08:12

## 2023-12-07 RX ADMIN — OXYCODONE AND ACETAMINOPHEN 1 TABLET: 10; 325 TABLET ORAL at 02:12

## 2023-12-07 RX ADMIN — OXYBUTYNIN CHLORIDE 5 MG: 5 TABLET ORAL at 08:12

## 2023-12-07 RX ADMIN — CARVEDILOL 25 MG: 12.5 TABLET, FILM COATED ORAL at 08:12

## 2023-12-07 RX ADMIN — NICOTINE 1 PATCH: 21 PATCH, EXTENDED RELEASE TRANSDERMAL at 08:12

## 2023-12-07 RX ADMIN — AMLODIPINE BESYLATE 10 MG: 5 TABLET ORAL at 08:12

## 2023-12-07 RX ADMIN — ATORVASTATIN CALCIUM 20 MG: 10 TABLET, FILM COATED ORAL at 08:12

## 2023-12-07 RX ADMIN — AMPICILLIN SODIUM 2 G: 2 INJECTION, POWDER, FOR SOLUTION INTRAMUSCULAR; INTRAVENOUS at 11:12

## 2023-12-07 RX ADMIN — CEFTRIAXONE SODIUM 2 G: 2 INJECTION, POWDER, FOR SOLUTION INTRAMUSCULAR; INTRAVENOUS at 07:12

## 2023-12-07 RX ADMIN — OXYCODONE AND ACETAMINOPHEN 1 TABLET: 10; 325 TABLET ORAL at 11:12

## 2023-12-07 NOTE — PLAN OF CARE
Called by patient's nurse in regard to need for central venous access for long-term antibiotics.  Patient had previously undergone a PICC line 2 years ago, but he states that the line had blown and that he is unable to get any other line of a similar nature.  He will need long-term antibiotics for the next few weeks per ID recommendations, and in light of refusing PICC line, nursing contacted surgery for central venous access.  Had extensive discussion with patient and wife at bedside in regards to procedure for central venous access line placement and the risks associated with the procedure.  After discussion, patient adamantly refused temporary central venous line insertion.  Discussed with patient that other options exist such as Lu line placed with Radiology.  He would like to elect for this option at this time.  Recommend consultation with Radiology for tunneled central line placement or reconsult PICC team for re-attempt at line placement.    Aimee Mathis MD   [employer] General Surgery PGY 4

## 2023-12-07 NOTE — PROGRESS NOTES
Infectious Diseases Progress Note  58-year-old male with past medical history of T-spine cord injury with paraplegia, diabetes, HTN, hepatitis-C, chronic MRSA L ankle wound/osteomyelitis, was on suppressive doxycycline, known to my service, seen by us initially at [facility] and has followed with us at the office for chronic osteomyelitis, most recently seen during his prolonged hospital admission of 06/28/2023, this same facility, [facility] when he had presented with complaints of pain and swelling to his right knee, apparently struck his knee.  He did also report prior remote right knee surgery.  He was evaluated and noted to have no fevers initially but a temperature of 100.2° today 6/29, no leukocytosis but with thrombocytosis of up to 531 today.  .2 and ESR >130.  CT scan of the right knee at the time noted a 5 cm area of subcutaneous fluid collection in the prepatellar region.  There was aspiration with findings of purulent fluid and cultures showing group B Streptococcus.  He was seen by the orthopedic surgery team with findings of right prepatellar bursa abscess and had incision and drainage of right knee septic arthritis and right prepatellar bursa abscess on 6/29 with cultures yielding group B Streptococcus.  He was treated with a prolonged course of ceftriaxone.  That hospital stay was complicated with acute kidney injury and acute hypoxic respiratory failure with pulmonary edema, pleural effusions and pneumonitis requiring prolonged ICU management on ventilator with tracheostomy and PEG tube placement on 08/06.  He was subsequently stabilized and discharged to LTAC on 08/09 and then discharged home from LTAC on 10/03.  He is admitted this time on 11/29/2023 presenting through the ED with complaints of right knee purulent drainage.  He was evaluated and noted to have no fevers but with leukocytosis of 17.28, ESR 65, .1, abnormal renal function with  creatinine of 1.29, anemic with low albumin.  Urine toxicology test positive for cocaine, benzodiazepines and opiates.  Blood cultures have been negative.  Right knee abscess culture from 11/29 showing moderate Streptococcus agalactiae and Enterococcus. X-ray of the right knee showed soft tissue swelling with no acute abnormality.  X-ray of the femur showed internal fixation with intact hardware and significant deformity of distal femur from remote trauma.  He was seen by ortho team of Dr. Shen, taken to surgery today 11/30 for right knee arthrotomy and removal of foreign body due to infection with cultures revealing GBS, Enterococcus and Ecoli.   He is currently on antibiotic coverage with Ampicillin, ceftriaxone and oral doxycycline     Subjective:  Lying in bed in no acute distress. No new complaints voiced. Afebrile. Wife present    ROS  Constitutional:  Positive for malaise/fatigue.   HENT: Negative.     Respiratory: Negative.     Gastrointestinal: Negative.    Genitourinary: Negative.    Musculoskeletal: Negative.         Right knee chronic infection with draining wound   Neurological:  Positive for focal weakness and weakness.   Endo/Heme/Allergies: Negative.    Psychiatric/Behavioral: Negative.     All other Systems review done and negative    Review of patient's allergies indicates:   Allergen Reactions    Baclofen Itching and Anxiety       Past Medical History:   Diagnosis Date    Arthritis     Chronic ulcer of ankle 05/26/2022    Frequent UTI 07/02/2019    Generalized anxiety disorder 05/26/2022    Neurogenic bladder 05/26/2022    Osteomyelitis 05/26/2022    Presence of suprapubic catheter 05/26/2022    Pure hypercholesterolemia 05/26/2022    Retention of urine, unspecified 08/09/2019    Spinal cord injury at T1-T6 level 04/20/2018       Past Surgical History:   Procedure Laterality Date    FRACTURE SURGERY  2021    INCISION AND DRAINAGE, LOWER EXTREMITY Right 06/29/2023    Procedure: INCISION AND  DRAINAGE, LOWER EXTREMITY;  Surgeon: Prabhu Shen DO;  Location: [facility] OR;  Service: Orthopedics;  Laterality: Right;  supine bone foam wash stuff cultures    INCISION AND DRAINAGE, LOWER EXTREMITY Right 2023    Procedure: INCISION AND DRAINAGE, LOWER EXTREMITY;  Surgeon: Prabhu Shen DO;  Location: [facility] OR;  Service: Orthopedics;  Laterality: Right;  supine any table bone foam wash stuff possible wound vac    INCISION AND DRAINAGE, LOWER EXTREMITY Right 2023    Procedure: INCISION AND DRAINAGE, LOWER EXTREMITY;  Surgeon: Prabhu Shen DO;  Location: [facility] OR;  Service: Orthopedics;  Laterality: Right;  supine bone foam vascular bed removal of distal femoral plate, wash stuff, possible AKA    INSERTION OF INTRAMEDULLARY MARISA Right 08/10/2022    Procedure: INSERTION, INTRAMEDULLARY MARISA RIGHT TIBIA;  Surgeon: Jorge Orellana MD;  Location: [facility] OR;  Service: Orthopedics;  Laterality: Right;    JOINT REPLACEMENT      Ankel    REMOVAL OF HARDWARE FROM LOWER EXTREMITY Right 2023    Procedure: REMOVAL, HARDWARE, LOWER EXTREMITY;  Surgeon: Prabhu Shen DO;  Location: [facility] OR;  Service: Orthopedics;  Laterality: Right;    SPINE SURGERY  2018       Social History     Socioeconomic History    Marital status:    Tobacco Use    Smoking status: Some Days     Current packs/day: 0.00     Average packs/day: 0.2 packs/day for 41.5 years (10.4 ttl pk-yrs)     Types: Cigars, Cigarettes     Start date: 1982     Last attempt to quit: 2023     Years since quittin.3    Smokeless tobacco: Never   Substance and Sexual Activity    Alcohol use: Not Currently     Alcohol/week: 2.0 standard drinks of alcohol     Types: 2 Cans of beer per week    Drug use: Not Currently     Types: Oxycodone    Sexual activity: Not Currently     Partners: Female     Birth control/protection: None     Social Determinants of Health     Financial Resource Strain: Low Risk  (2023)    Overall Financial  Resource Strain (CARDIA)     Difficulty of Paying Living Expenses: Not very hard   Food Insecurity: No Food Insecurity (8/9/2023)    Hunger Vital Sign     Worried About Running Out of Food in the Last Year: Never true     Ran Out of Food in the Last Year: Never true   Transportation Needs: No Transportation Needs (8/9/2023)    PRAPARE - Transportation     Lack of Transportation (Medical): No     Lack of Transportation (Non-Medical): No   Physical Activity: Inactive (8/9/2023)    Exercise Vital Sign     Days of Exercise per Week: 0 days     Minutes of Exercise per Session: 0 min   Stress: No Stress Concern Present (8/9/2023)    [employer] - Occupational Stress Questionnaire     Feeling of Stress : Only a little   Social Connections: Moderately Isolated (10/3/2023)    Social Connection and Isolation Panel [NHANES]     Frequency of Communication with Friends and Family: Twice a week     Frequency of Social Gatherings with Friends and Family: Twice a week     Attends Tenriism Services: Never     Active Member of Clubs or Organizations: No     Attends Club or Organization Meetings: Never     Marital Status:    Housing Stability: Low Risk  (8/9/2023)    Housing Stability Vital Sign     Unable to Pay for Housing in the Last Year: No     Number of Places Lived in the Last Year: 1     Unstable Housing in the Last Year: No         Scheduled Meds:   amLODIPine  10 mg Oral Daily    ampicillin IVPB  2 g Intravenous Q4H    atorvastatin  20 mg Oral Nightly    B12-levomefolate calcium-B6  1 tablet Oral Daily    carvediloL  25 mg Oral BID WM    cefTRIAXone (ROCEPHIN) IVPB  2 g Intravenous Q24H    docusate sodium  200 mg Oral BID    doxycycline  100 mg Oral Q12H    enoxaparin  40 mg Subcutaneous Daily    ferrous sulfate  1 tablet Oral BID    FLUoxetine  20 mg Oral Daily    guaiFENesin  600 mg Oral BID    methocarbamoL  750 mg Oral Q6H    nicotine  1 patch Transdermal Daily    oxybutynin  5 mg  "Oral BID    polyethylene glycol  17 g Oral Daily     Continuous Infusions:  PRN Meds:dextrose 10%, dextrose 10%, glucagon (human recombinant), glucose, glucose, hydrALAZINE, insulin aspart U-100, labetaloL, ondansetron, oxyCODONE-acetaminophen, oxyCODONE-acetaminophen, temazepam, zolpidem    Objective:  /82   Pulse 94   Temp 98.2 °F (36.8 °C) (Oral)   Resp 18   Ht 5' 9" (1.753 m)   Wt 72.6 kg (160 lb)   SpO2 98%   BMI 23.63 kg/m²     Physical Exam:   Physical Exam  Vitals reviewed.   Constitutional:       General: He is not in acute distress.  HENT:      Head: Normocephalic and atraumatic.   Cardiovascular:      Rate and Rhythm: Normal rate and regular rhythm.      Heart sounds: Normal heart sounds.   Pulmonary:      Effort: Pulmonary effort is normal. No respiratory distress.      Breath sounds: Normal breath sounds.   Abdominal:      General: Bowel sounds are normal. There is no distension.      Palpations: Abdomen is soft.      Tenderness: There is no abdominal tenderness.   Genitourinary:     Comments: Stover catheter in place  Musculoskeletal:         General: No deformity.      Cervical back: Neck supple.      Comments: Contracted lower extremities   Skin:     Findings: No erythema or rash.      Comments: Right knee is dressed    Neurological:      Mental Status: He is alert and oriented to person, place, and time.   Psychiatric:      Comments: Calm and cooperative    Imaging      Lab Review   Recent Results (from the past 24 hour(s))   POCT glucose    Collection Time: 12/06/23 11:57 AM   Result Value Ref Range    POCT Glucose 105 70 - 110 mg/dL   POCT glucose    Collection Time: 12/06/23  4:26 PM   Result Value Ref Range    POCT Glucose 79 70 - 110 mg/dL       Assessment/Plan:  1.  Sepsis with leukocytosis  2.  Chronic right knee infection with septic arthritis and osteomyelitis of the femur - GBS / Enterococcus / Ecoli isolates  3. Acute kidney injury/chronic kidney disease  4.  Elevated ESR, " CRP  5.  Cocaine / tobacco use  6.  Diabetes type 2  7.  Anemia   8.  Protein calorie malnutrition  9.  Paraplegia   10. Chronic MRSA L ankle/foot osteomyelitis with retained hardware     -Continue Ampicillin #6, Ceftriaxone #3 and chronic suppression with oral Doxycycline. Plan a 6 week course following inflammatory markers (End date 1/15/24)  -Will need chronic oral suppressive antibiotic therapy with ampicillin and doxycycline upon completion of IV antibiotics  -Afebrile without leukocytosis, follow  -12/2 ESR > 130 from 65 and .8 from 211.10  -Ortho on board, inputs noted  -S/P R knee arthrotomy with removal of foreign body on 11/30, cultures revealed Streptococcus agalactiae, Ecoli and Enterococcus  -S/P I&D R femur with removal of hardware and placement of antibiotic spacer  -R knee abscess culture from 11/29 revealed moderate Streptococcus agalactiae and Enterococcus  -Discussed with patient and nursing. CM to work on discharge home with HH and OPAT. He will follow up with us as an outpatient about 2 weeks post discharge

## 2023-12-07 NOTE — PROGRESS NOTES
[facility] Hospital Medicine Progress Note        Chief Complaint: Inpatient Follow-up for Rt knee septic arthritis        HPI:   58 yr old male with PMHx of  HTN, DM II,  Afib (on Xarelto), paraplegia secondary to T- spine cord injury( T1-T6), neurogenic bladder with chronic suprapubic catheter, hepatitis-C, chronic right ankle wound/osteomyelitis, presented to the ED with right knee pain with purulent drainage which apparently started this morning. He was treated for right patellar bursa abscess and S/P I&D on 6/29/23. Cultures at that time grew streptococcus agalactiae. He's on chronic suppressive therapy with oral  Doxycycline.      Vital signs on arrival: T 97.9, P 83, R 20, B/P 96/56, sats 98% on room air. Initial labs include WBC 17.28, Hgb 11.1, Hct 36.6, BUN 12.6, creatinine 1.29, ESR 65. X-ray of the right knee showed soft tissue swelling with no acute abnormality.  X-ray of the femur showed internal fixation with intact hardware and significant deformity of distal femur from remote trauma. Meds given in ED include Zosyn and Vancomycin.     Orthopedic surgery was consulted for right septic knee.  Per chart review patient has a chronic infection of the right knee. Patient has been offered above-the-knee amputation several times by Orthopedic surgery which he has declined. Infectious Disease service was consulted as well. Pt is well known to Dr. Hutchison's service. Blood cultures have been negative.  Right knee abscess culture from 11/29 showing similar bacteria moderate growth of Streptococcus agalactiae.        Pt was taken to OR  by ortho  Dr. Shen on 11/30 showed  gross purulence throughout the knee joint and hardware.  Also retained foreign bodies from sponge changes. Pt underwent  Right knee arthrotomy and removal of foreign body. Pt was taken back to OR on 12/1/23   due to the gross amount of purulence and underwent incision and drainage of right distal femur osteomyelitis  with open bone window, removal of hardware deep right femur and implant of antibiotic spacer Stimulan beads. Intraoperative culture from 11/30 grew out E.coli, GBS, Enterococcus faecalis. No anaerobes isolated. ID changed antibiotic to IV Ampicillin 2 gram q4h.        Interval Hx:   Patient is resting comfortably.  Patient continues to refuse central venous access.  Patient is afebrile, room air, and hemodynamically stable. Case was discussed with  on the floor.      Objective/physical exam:  General: in no acute distress  HENT: normocephalic, atraumatic  Eye: PERRL, EOMI, clear conjunctiva  Neck: full ROM, no thyromegaly, no JVD  Respiratory: clear to auscultation bilaterally  Cardiovascular: regular rate and rhythm  Gastrointestinal: non-distended, positive bowel sounds, non-tender  Genitourinary:  Suprapubic catheter  Musculoskeletal: no gross deformity  Integumentary: warm, dry, intact, no rashes  Neurological: cranial nerves grossly intact, paraplegic  Psychiatric: cooperative, flat affect, depressed appearing      VITAL SIGNS: 24 HRS MIN & MAX LAST   Temp  Min: 97.9 °F (36.6 °C)  Max: 98.3 °F (36.8 °C) 97.9 °F (36.6 °C)   BP  Min: 123/74  Max: 148/79 123/74   Pulse  Min: 76  Max: 94  84   Resp  Min: 18  Max: 18 18   SpO2  Min: 95 %  Max: 98 % 98 %     I have reviewed the following labs:  Recent Labs   Lab 12/03/23  0601 12/04/23  0352 12/05/23  0438   WBC 10.51 7.99 7.57   RBC 3.45* 3.57* 3.79*   HGB 8.4* 8.9* 9.4*   HCT 27.3* 27.8* 29.8*   MCV 79.1* 77.9* 78.6*   MCH 24.3* 24.9* 24.8*   MCHC 30.8* 32.0* 31.5*   RDW 17.7* 17.3* 17.5*   * 455* 520*   MPV 9.6 9.1 9.2     Recent Labs   Lab 12/03/23  0601      K 4.7   CO2 30*   BUN 4.6*   CREATININE 0.70*   CALCIUM 9.2   MG 1.90     Microbiology Results (last 7 days)       Procedure Component Value Units Date/Time    Blood Culture [[id number]]  (Normal) Collected: 11/29/23 1257    Order Status: Completed Specimen: Blood Updated: 12/04/23  1401     CULTURE, BLOOD ([facility]) No Growth at 5 days    Blood Culture [[id number]]  (Normal) Collected: 11/29/23 1257    Order Status: Completed Specimen: Blood Updated: 12/04/23 1401     CULTURE, BLOOD ([facility]) No Growth at 5 days    Wound Culture [[id number]]  (Abnormal)  (Susceptibility) Collected: 11/30/23 1431    Order Status: Completed Specimen: Abscess from Knee, Right Updated: 12/04/23 1010     Wound Culture Few Escherichia coli      Few Enterococcus faecalis      Few Streptococcus agalactiae (Group B)    Wound Culture [[id number]]  (Abnormal)  (Susceptibility) Collected: 11/29/23 1536    Order Status: Completed Specimen: Abscess from Knee, Right Updated: 12/03/23 0808     Wound Culture Moderate Streptococcus agalactiae (Group B)      Moderate Enterococcus faecalis    Anaerobic Culture [[id number]] Collected: 11/30/23 1431    Order Status: Completed Specimen: Abscess from Knee, Right Updated: 12/03/23 0713     Anaerobe Culture No Anaerobes Isolated    Gram Stain [[id number]] Collected: 11/30/23 1431    Order Status: Completed Specimen: Abscess from Knee, Right Updated: 12/01/23 0744     GRAM STAIN Rare WBC observed      No bacteria seen    Fungal Culture [[id number]] Collected: 11/30/23 1431    Order Status: Sent Specimen: Abscess from Knee, Right Updated: 11/30/23 1443    Aerobic culture [[id number]] Collected: 11/30/23 1431    Order Status: Canceled Specimen: Abscess from Knee, Right Updated: 11/30/23 1443             See below for Radiology    Scheduled Med:   amLODIPine  10 mg Oral Daily    ampicillin IVPB  2 g Intravenous Q4H    atorvastatin  20 mg Oral Nightly    B12-levomefolate calcium-B6  1 tablet Oral Daily    carvediloL  25 mg Oral BID WM    cefTRIAXone (ROCEPHIN) IVPB  2 g Intravenous Q24H    docusate sodium  200 mg Oral BID    doxycycline  100 mg Oral Q12H    enoxaparin  40 mg Subcutaneous Daily    ferrous sulfate  1 tablet Oral BID    FLUoxetine  20 mg Oral Daily    guaiFENesin  600 mg Oral BID     methocarbamoL  750 mg Oral Q6H    nicotine  1 patch Transdermal Daily    oxybutynin  5 mg Oral BID      Continuous Infusions:  None.       PRN Meds:  dextrose 10%, dextrose 10%, glucagon (human recombinant), glucose, glucose, hydrALAZINE, insulin aspart U-100, labetaloL, ondansetron, oxyCODONE-acetaminophen, oxyCODONE-acetaminophen, temazepam, zolpidem     Assessment/Plan:  Sepsis due to Right knee septic arthritis, chronic Right distal femur osteomyelitis, right femur infected hardware status post Right knee arthrotomy and removal of foreign body (11/30/23) and incision and drainage of right distal femur osteomyelitis with open bone window, removal of hardware deep right femur and implant of antibiotic spacer Stimulan beads (12/1/23)  Anemia of chronic disease  Polysubstance abuse  Non-insulin-dependent type 2 diabetes mellitus  Moderate protein calorie malnutrition  Paraplegia      History of recurrent bacterial cystitis, generalized anxiety disorder, Neurogenic bladder, Presence of Suprapubic catheter,        Plan:  Patient refused PICC line placement.  Patient refused midline placement.  Apparently general surgery discouraged the patient from having a temporary central venous catheter placed.  Interventional Radiology was consulted and they have refused to place a central venous catheter.  Now we have consulted Vascular Surgery for assistance.    Continue current antibiotic regimen as outlined by Infectious Disease  Continue appropriate home medications and other supportive care while awaiting placement    VTE prophylaxis: Lovenox      Patient condition:  Stable     Anticipated discharge and Disposition:   Long-term acute care [facility]        All diagnosis and differential diagnosis have been reviewed; assessment and plan has been documented; I have personally reviewed the labs and test results that are presently available; I have reviewed the patients medication list; I have reviewed the consulting providers  response and recommendations. I have reviewed or attempted to review medical records based upon their availability    All of the patient's questions have been  addressed and answered. Patient's is agreeable to the above stated plan. I will continue to monitor closely and make adjustments to medical management as needed.  ___________________    Karri Corbin MD   12/07/2023

## 2023-12-08 VITALS
BODY MASS INDEX: 23.7 KG/M2 | RESPIRATION RATE: 16 BRPM | DIASTOLIC BLOOD PRESSURE: 63 MMHG | TEMPERATURE: 98 F | WEIGHT: 160 LBS | SYSTOLIC BLOOD PRESSURE: 100 MMHG | HEIGHT: 69 IN | HEART RATE: 90 BPM | OXYGEN SATURATION: 96 %

## 2023-12-08 LAB — POCT GLUCOSE: 76 MG/DL (ref 70–110)

## 2023-12-08 PROCEDURE — 63600175 PHARM REV CODE 636 W HCPCS: Performed by: NURSE PRACTITIONER

## 2023-12-08 PROCEDURE — 25000003 PHARM REV CODE 250: Performed by: NURSE PRACTITIONER

## 2023-12-08 PROCEDURE — C1751 CATH, INF, PER/CENT/MIDLINE: HCPCS

## 2023-12-08 PROCEDURE — 36569 INSJ PICC 5 YR+ W/O IMAGING: CPT

## 2023-12-08 PROCEDURE — 25000003 PHARM REV CODE 250: Performed by: INTERNAL MEDICINE

## 2023-12-08 PROCEDURE — S4991 NICOTINE PATCH NONLEGEND: HCPCS | Performed by: NURSE PRACTITIONER

## 2023-12-08 RX ORDER — SODIUM CHLORIDE 0.9 % (FLUSH) 0.9 %
10 SYRINGE (ML) INJECTION
Status: DISCONTINUED | OUTPATIENT
Start: 2023-12-08 | End: 2023-12-08 | Stop reason: HOSPADM

## 2023-12-08 RX ORDER — OXYCODONE AND ACETAMINOPHEN 10; 325 MG/1; MG/1
1 TABLET ORAL EVERY 4 HOURS PRN
Status: CANCELLED | OUTPATIENT
Start: 2023-12-08

## 2023-12-08 RX ORDER — DOCUSATE SODIUM 100 MG/1
200 CAPSULE, LIQUID FILLED ORAL 2 TIMES DAILY
Status: CANCELLED | OUTPATIENT
Start: 2023-12-08

## 2023-12-08 RX ORDER — SODIUM CHLORIDE 0.9 % (FLUSH) 0.9 %
10 SYRINGE (ML) INJECTION EVERY 6 HOURS
Status: DISCONTINUED | OUTPATIENT
Start: 2023-12-08 | End: 2023-12-08 | Stop reason: HOSPADM

## 2023-12-08 RX ORDER — IBUPROFEN 200 MG
1 TABLET ORAL DAILY
Status: CANCELLED | OUTPATIENT
Start: 2023-12-09

## 2023-12-08 RX ORDER — OXYBUTYNIN CHLORIDE 5 MG/1
5 TABLET ORAL 2 TIMES DAILY
Status: CANCELLED | OUTPATIENT
Start: 2023-12-08

## 2023-12-08 RX ORDER — IBUPROFEN 200 MG
16 TABLET ORAL
Status: CANCELLED | OUTPATIENT
Start: 2023-12-08

## 2023-12-08 RX ORDER — HYDRALAZINE HYDROCHLORIDE 50 MG/1
50 TABLET, FILM COATED ORAL EVERY 8 HOURS
Status: DISCONTINUED | OUTPATIENT
Start: 2023-12-08 | End: 2023-12-08 | Stop reason: HOSPADM

## 2023-12-08 RX ORDER — INSULIN ASPART 100 [IU]/ML
1-10 INJECTION, SOLUTION INTRAVENOUS; SUBCUTANEOUS
Status: CANCELLED | OUTPATIENT
Start: 2023-12-08

## 2023-12-08 RX ORDER — HYDRALAZINE HYDROCHLORIDE 20 MG/ML
20 INJECTION INTRAMUSCULAR; INTRAVENOUS
Status: CANCELLED | OUTPATIENT
Start: 2023-12-08

## 2023-12-08 RX ORDER — LABETALOL HYDROCHLORIDE 5 MG/ML
20 INJECTION, SOLUTION INTRAVENOUS
Status: CANCELLED | OUTPATIENT
Start: 2023-12-08

## 2023-12-08 RX ORDER — ENOXAPARIN SODIUM 100 MG/ML
40 INJECTION SUBCUTANEOUS EVERY 24 HOURS
Status: CANCELLED | OUTPATIENT
Start: 2023-12-08

## 2023-12-08 RX ORDER — HYDRALAZINE HYDROCHLORIDE 50 MG/1
50 TABLET, FILM COATED ORAL EVERY 8 HOURS
Status: CANCELLED | OUTPATIENT
Start: 2023-12-08

## 2023-12-08 RX ORDER — OXYCODONE AND ACETAMINOPHEN 5; 325 MG/1; MG/1
1 TABLET ORAL EVERY 6 HOURS PRN
Status: CANCELLED | OUTPATIENT
Start: 2023-12-08

## 2023-12-08 RX ORDER — AMLODIPINE BESYLATE 5 MG/1
10 TABLET ORAL DAILY
Status: CANCELLED | OUTPATIENT
Start: 2023-12-09

## 2023-12-08 RX ORDER — LANOLIN ALCOHOL/MO/W.PET/CERES
1 CREAM (GRAM) TOPICAL 2 TIMES DAILY
Status: CANCELLED | OUTPATIENT
Start: 2023-12-08

## 2023-12-08 RX ORDER — ATORVASTATIN CALCIUM 10 MG/1
20 TABLET, FILM COATED ORAL NIGHTLY
Status: CANCELLED | OUTPATIENT
Start: 2023-12-08

## 2023-12-08 RX ORDER — SODIUM CHLORIDE 0.9 % (FLUSH) 0.9 %
10 SYRINGE (ML) INJECTION
Status: CANCELLED | OUTPATIENT
Start: 2023-12-08

## 2023-12-08 RX ORDER — GUAIFENESIN 600 MG/1
600 TABLET, EXTENDED RELEASE ORAL 2 TIMES DAILY
Status: CANCELLED | OUTPATIENT
Start: 2023-12-08

## 2023-12-08 RX ORDER — ONDANSETRON 2 MG/ML
4 INJECTION INTRAMUSCULAR; INTRAVENOUS EVERY 6 HOURS PRN
Status: CANCELLED | OUTPATIENT
Start: 2023-12-08

## 2023-12-08 RX ORDER — FLUOXETINE HYDROCHLORIDE 20 MG/1
20 CAPSULE ORAL DAILY
Status: CANCELLED | OUTPATIENT
Start: 2023-12-09

## 2023-12-08 RX ORDER — METHOCARBAMOL 750 MG/1
750 TABLET, FILM COATED ORAL EVERY 6 HOURS
Status: CANCELLED | OUTPATIENT
Start: 2023-12-08

## 2023-12-08 RX ORDER — ZOLPIDEM TARTRATE 5 MG/1
5 TABLET ORAL NIGHTLY PRN
Status: CANCELLED | OUTPATIENT
Start: 2023-12-08

## 2023-12-08 RX ORDER — GLUCAGON 1 MG
1 KIT INJECTION
Status: CANCELLED | OUTPATIENT
Start: 2023-12-08

## 2023-12-08 RX ORDER — IBUPROFEN 200 MG
24 TABLET ORAL
Status: CANCELLED | OUTPATIENT
Start: 2023-12-08

## 2023-12-08 RX ORDER — SODIUM CHLORIDE 0.9 % (FLUSH) 0.9 %
10 SYRINGE (ML) INJECTION EVERY 6 HOURS
Status: CANCELLED | OUTPATIENT
Start: 2023-12-08

## 2023-12-08 RX ORDER — DOXYCYCLINE HYCLATE 100 MG
100 TABLET ORAL EVERY 12 HOURS
Status: CANCELLED | OUTPATIENT
Start: 2023-12-08

## 2023-12-08 RX ORDER — CARVEDILOL 12.5 MG/1
25 TABLET ORAL 2 TIMES DAILY WITH MEALS
Status: CANCELLED | OUTPATIENT
Start: 2023-12-08

## 2023-12-08 RX ADMIN — OXYCODONE AND ACETAMINOPHEN 1 TABLET: 10; 325 TABLET ORAL at 09:12

## 2023-12-08 RX ADMIN — AMPICILLIN SODIUM 2 G: 2 INJECTION, POWDER, FOR SOLUTION INTRAMUSCULAR; INTRAVENOUS at 10:12

## 2023-12-08 RX ADMIN — AMPICILLIN SODIUM 2 G: 2 INJECTION, POWDER, FOR SOLUTION INTRAMUSCULAR; INTRAVENOUS at 02:12

## 2023-12-08 RX ADMIN — DOXYCYCLINE HYCLATE 100 MG: 100 TABLET, COATED ORAL at 09:12

## 2023-12-08 RX ADMIN — OXYCODONE AND ACETAMINOPHEN 1 TABLET: 10; 325 TABLET ORAL at 03:12

## 2023-12-08 RX ADMIN — AMLODIPINE BESYLATE 10 MG: 5 TABLET ORAL at 09:12

## 2023-12-08 RX ADMIN — CARVEDILOL 25 MG: 12.5 TABLET, FILM COATED ORAL at 09:12

## 2023-12-08 RX ADMIN — FLUOXETINE 20 MG: 20 CAPSULE ORAL at 09:12

## 2023-12-08 RX ADMIN — NICOTINE 1 PATCH: 21 PATCH, EXTENDED RELEASE TRANSDERMAL at 09:12

## 2023-12-08 RX ADMIN — METHOCARBAMOL 750 MG: 750 TABLET ORAL at 11:12

## 2023-12-08 RX ADMIN — OXYBUTYNIN CHLORIDE 5 MG: 5 TABLET ORAL at 09:12

## 2023-12-08 RX ADMIN — AMPICILLIN SODIUM 2 G: 2 INJECTION, POWDER, FOR SOLUTION INTRAMUSCULAR; INTRAVENOUS at 01:12

## 2023-12-08 RX ADMIN — GUAIFENESIN 600 MG: 600 TABLET, EXTENDED RELEASE ORAL at 09:12

## 2023-12-08 RX ADMIN — FERROUS SULFATE TAB 325 MG (65 MG ELEMENTAL FE) 1 EACH: 325 (65 FE) TAB at 09:12

## 2023-12-08 RX ADMIN — AMPICILLIN SODIUM 2 G: 2 INJECTION, POWDER, FOR SOLUTION INTRAMUSCULAR; INTRAVENOUS at 05:12

## 2023-12-08 RX ADMIN — Medication 1 TABLET: at 09:12

## 2023-12-08 RX ADMIN — METHOCARBAMOL 750 MG: 750 TABLET ORAL at 05:12

## 2023-12-08 RX ADMIN — OXYCODONE AND ACETAMINOPHEN 1 TABLET: 10; 325 TABLET ORAL at 02:12

## 2023-12-08 NOTE — PROCEDURES
"Bianca Khan is a 58 y.o. male patient.    Temp: 97.9 °F (36.6 °C) (12/08/23 1153)  Pulse: 90 (12/08/23 1153)  Resp: 16 (12/08/23 1153)  BP: 100/63 (12/08/23 1153)  SpO2: 96 % (12/08/23 1153)  Weight: 72.6 kg (160 lb) (11/30/23 1540)  Height: 5' 9" (175.3 cm) (11/30/23 1540)    PICC  Date/Time: 12/8/2023 12:36 PM  Performed by: Pillo Mansfield RN  Consent Done: Yes  Time out: Immediately prior to procedure a time out was called to verify the correct patient, procedure, equipment, support staff and site/side marked as required  Preparation: skin prepped with ChloraPrep  Skin prep agent dried: skin prep agent completely dried prior to procedure  Sterile barriers: all five maximum sterile barriers used - cap, mask, sterile gown, sterile gloves, and large sterile sheet  Hand hygiene: hand hygiene performed prior to central venous catheter insertion  Location details: right brachial  Catheter type: double lumen  Catheter size: 5 Fr  Catheter Length: 35cm    Ultrasound guidance: yes  Vessel Caliber: medium and patent, compressibility normal  Number of attempts: 1  Post-procedure: blood return through all ports and sterile dressing applied    Assessment: placement verified by x-ray and successful placement          Name Pillo Mansfield RN  12/8/2023    "

## 2023-12-08 NOTE — NURSING
[facility] - 9th Floor Med Surg  Wound Care    Patient Name:  Bianca Khan   MRN:  [id number]  Date: 2023  Diagnosis: Chronic infection of right knee    History:     Past Medical History:   Diagnosis Date    Arthritis     Chronic ulcer of ankle 2022    Frequent UTI 2019    Generalized anxiety disorder 2022    Neurogenic bladder 2022    Osteomyelitis 2022    Presence of suprapubic catheter 2022    Pure hypercholesterolemia 2022    Retention of urine, unspecified 2019    Spinal cord injury at T1-T6 level 2018       Social History     Socioeconomic History    Marital status:    Tobacco Use    Smoking status: Some Days     Current packs/day: 0.00     Average packs/day: 0.2 packs/day for 41.5 years (10.4 ttl pk-yrs)     Types: Cigars, Cigarettes     Start date: 1982     Last attempt to quit: 2023     Years since quittin.3    Smokeless tobacco: Never   Substance and Sexual Activity    Alcohol use: Not Currently     Alcohol/week: 2.0 standard drinks of alcohol     Types: 2 Cans of beer per week    Drug use: Not Currently     Types: Oxycodone    Sexual activity: Not Currently     Partners: Female     Birth control/protection: None     Social Determinants of Health     Financial Resource Strain: Low Risk  (2023)    Overall Financial Resource Strain (CARDIA)     Difficulty of Paying Living Expenses: Not very hard   Food Insecurity: No Food Insecurity (2023)    Hunger Vital Sign     Worried About Running Out of Food in the Last Year: Never true     Ran Out of Food in the Last Year: Never true   Transportation Needs: No Transportation Needs (2023)    PRAPARE - Transportation     Lack of Transportation (Medical): No     Lack of Transportation (Non-Medical): No   Physical Activity: Inactive (2023)    Exercise Vital Sign     Days of Exercise per Week: 0 days     Minutes of Exercise per Session: 0 min   Stress: No Stress  Concern Present (8/9/2023)    [place] [employer] - Occupational Stress Questionnaire     Feeling of Stress : Only a little   Social Connections: Moderately Isolated (10/3/2023)    Social Connection and Isolation Panel [NHANES]     Frequency of Communication with Friends and Family: Twice a week     Frequency of Social Gatherings with Friends and Family: Twice a week     Attends [employer] Services: Never     Active Member of Clubs or Organizations: No     Attends Club or Organization Meetings: Never     Marital Status:    Housing Stability: Low Risk  (8/9/2023)    Housing Stability Vital Sign     Unable to Pay for Housing in the Last Year: No     Number of Places Lived in the Last Year: 1     Unstable Housing in the Last Year: No       Precautions:     Allergies as of 11/29/2023 - Reviewed 11/29/2023   Allergen Reaction Noted    Baclofen Itching and Anxiety 06/17/2019       WO Assessment Details/Treatment   Patient seen for follow up assessment. Patient resting on immerse mattress, prafo boots in place. Surgery following incision right knee. Right ankle and left heel wounds with slough will change to santyl, vashe dressing changes to all wounds. Patient tolerated well.      12/08/23 1315   WOCN Assessment   WOCN Total Time (mins) 60   Visit Date 12/08/23   Visit Time 1315   Consult Type Follow Up   WOCN Speciality Wound   Wound pressure   Number of Wounds 3   Intervention assessed;changed;applied;chart review;orders   Skin Interventions   Pressure Reduction Devices heel offloading device utilized   Positioning   Body Position 30 degrees;turned   Positioning/Transfer Devices wedge;pillows        Altered Skin Integrity 06/28/23 2230 Right lateral Ankle #6   Date First Assessed/Time First Assessed: 06/28/23 2230   Altered Skin Integrity Present on Admission - Did Patient arrive to the hospital with altered skin?: yes  Side: Right  Orientation: lateral  Location: Ankle  Wound Number: #6  Is  this injury dev...   Wound Image   (camera malfunction)   Description of Altered Skin Integrity Full thickness tissue loss. Base is covered by slough and/or eschar in the wound bed   Dressing Appearance Intact   Drainage Amount Moderate   Drainage Characteristics/Odor Clear;Serous   Appearance Slough;Granulating   Tissue loss description Full thickness   Yellow (%), Wound Tissue Color 50 %   Periwound Area Intact   Wound Edges Callused   Wound Length (cm) 1.6 cm   Wound Width (cm) 0.8 cm   Wound Depth (cm) 0.4 cm   Wound Volume (cm^3) 0.512 cm^3   Wound Surface Area (cm^2) 1.28 cm^2   Undermining (depth (cm)/location) 1.0cm @ 9-12 o'clock   Dressing Applied  (silver alginate, abd, kerlix)        Altered Skin Integrity 09/27/23 1527 Left Heel   Date First Assessed/Time First Assessed: 09/27/23 1527   Altered Skin Integrity Present on Admission - Did Patient arrive to the hospital with altered skin?: yes  Side: Left  Location: Heel   Wound Image   (canera malfunction)   Description of Altered Skin Integrity Full thickness tissue loss. Base is covered by slough and/or eschar in the wound bed   Drainage Amount Small   Drainage Characteristics/Odor Clear;Serous   Appearance Yellow   Tissue loss description Full thickness   Yellow (%), Wound Tissue Color 100 %   Periwound Area Intact   Wound Edges Callused   Wound Length (cm) 0.7 cm   Wound Width (cm) 0.4 cm   Wound Depth (cm) 0.2 cm   Wound Volume (cm^3) 0.056 cm^3   Wound Surface Area (cm^2) 0.28 cm^2   Care Wound cleanser   Dressing Applied  (silver alginate, abd, kerlix)        Altered Skin Integrity 11/30/23 1240 Right Buttocks Full thickness tissue loss. Subcutaneous fat may be visible but bone, tendon or muscle are not exposed   Date First Assessed/Time First Assessed: 11/30/23 1240   Altered Skin Integrity Present on Admission - Did Patient arrive to the hospital with altered skin?: yes  Side: Right  Location: Buttocks  Is this injury device related?: No   Description of Alte...   Wound Image   (camera malfunction)   Description of Altered Skin Integrity Full thickness tissue loss. Subcutaneous fat may be visible but bone, tendon or muscle are not exposed   Drainage Amount Moderate   Drainage Characteristics/Odor Clear;Serous   Appearance Slough;Granulating   Tissue loss description Full thickness   Red (%), Wound Tissue Color 75 %   Yellow (%), Wound Tissue Color 25 %   Periwound Area Intact   Wound Edges Defined   Wound Length (cm) 2 cm   Wound Width (cm) 1.6 cm   Wound Depth (cm) 0.2 cm   Wound Volume (cm^3) 0.64 cm^3   Wound Surface Area (cm^2) 3.2 cm^2   Care Sterile normal saline;Wound cleanser   Dressing Applied  (Silver alginate, bordered gauze)       Recommendations made to primary team to change wound care to santyl and vashe moistened gauze, continue pressure relief measures . Orders placed.     12/08/2023

## 2023-12-08 NOTE — PLAN OF CARE
12/08/23 1411   Final Note   Assessment Type Final Discharge Note   Anticipated Discharge Disposition Long Term   Post-Acute Status   Post-Acute Authorization Placement   Post-Acute Placement Status Set-up Complete/Auth obtained  ([facility])     Transport with [facility] at 4.

## 2023-12-08 NOTE — PLAN OF CARE
Problem: Adult Inpatient Plan of Care  Goal: Plan of Care Review  12/8/2023 0040 by Matthew Welch RN  Outcome: Ongoing, Progressing  12/8/2023 0040 by Matthew Welch RN  Outcome: Ongoing, Progressing  Goal: Patient-Specific Goal (Individualized)  12/8/2023 0040 by Matthew Welch RN  Outcome: Ongoing, Progressing  12/8/2023 0040 by Matthew Welch RN  Outcome: Ongoing, Progressing  Goal: Absence of Hospital-Acquired Illness or Injury  12/8/2023 0040 by Matthew Welch RN  Outcome: Ongoing, Progressing  12/8/2023 0040 by Matthew Welch RN  Outcome: Ongoing, Progressing  Goal: Optimal Comfort and Wellbeing  12/8/2023 0040 by Matthew Welch RN  Outcome: Ongoing, Progressing  12/8/2023 0040 by Matthew Welch RN  Outcome: Ongoing, Progressing  Goal: Readiness for Transition of Care  12/8/2023 0040 by Matthew Welch RN  Outcome: Ongoing, Progressing  12/8/2023 0040 by Matthew Welch RN  Outcome: Ongoing, Progressing     Problem: Infection  Goal: Absence of Infection Signs and Symptoms  12/8/2023 0040 by Matthew Welch RN  Outcome: Ongoing, Progressing  12/8/2023 0040 by Matthew Welch RN  Outcome: Ongoing, Progressing     Problem: Impaired Wound Healing  Goal: Optimal Wound Healing  Outcome: Ongoing, Progressing     Problem: Diabetes Comorbidity  Goal: Blood Glucose Level Within Targeted Range  12/8/2023 0040 by Matthew Welch RN  Outcome: Ongoing, Progressing  12/8/2023 0040 by Matthew Welch RN  Outcome: Ongoing, Progressing     Problem: Skin Injury Risk Increased  Goal: Skin Health and Integrity  12/8/2023 0040 by Matthew Welch RN  Outcome: Ongoing, Progressing  12/8/2023 0040 by Matthew Welch RN  Outcome: Ongoing, Progressing     Problem: Fall Injury Risk  Goal: Absence of Fall and Fall-Related Injury  Outcome: Ongoing, Progressing

## 2023-12-08 NOTE — DISCHARGE SUMMARY
[facility] Hospital Medicine Discharge Summary    Admit Date: 11/29/2023  Discharge Date and Time: 12/8/2023 2:02 PM  Admitting Physician:  Team  Discharging Physician: Karri Corbin MD.  Primary Care Physician: Areli Vargas PA-C  Consults: General Surgery, Infectious Disease, and Orthopedic Surgery    Discharge Diagnoses:  Sepsis due to Right knee septic arthritis, chronic Right distal femur osteomyelitis, right femur infected hardware status post Right knee arthrotomy and removal of foreign body (11/30/23) and incision and drainage of right distal femur osteomyelitis with open bone window, removal of hardware deep right femur and implant of STIMULAN® Rapid Cure Beads (12/1/23)  Anemia of chronic disease  Cocaine abuse  Non-insulin-dependent type 2 diabetes mellitus  Moderate protein calorie malnutrition  Paraplegia   Generalized anxiety disorder   History of recurrent bacterial cystitis  Neurogenic bladder with suprapubic catheter    Hospital Course:   Patient is a 58-year-old  male with PMHx of cocaine abuse, HTN, DM II,  Afib (on Xarelto), paraplegia secondary to T- spine cord injury( T1-T6), neurogenic bladder with chronic suprapubic catheter, hepatitis-C, chronic right ankle wound/osteomyelitis, presented to the ED with right knee pain with purulent drainage which apparently started this morning. He was treated for right patellar bursa abscess and S/P I&D on 6/29/23. Cultures at that time grew Streptococcus agalactiae.  Patient is on chronic suppressive therapy with oral doxycycline.      Vital signs on arrival: T 97.9, P 83, R 20, B/P 96/56, sats 98% on room air. Initial labs include WBC 17.28, Hgb 11.1, Hct 36.6, BUN 12.6, creatinine 1.29, ESR 65. X-ray of the right knee showed soft tissue swelling with no acute abnormality.  X-ray of the femur showed internal fixation with intact hardware and significant deformity of distal femur from remote  trauma. Meds given in ED include Zosyn and Vancomycin.     Orthopedic surgery was consulted for right septic knee.  Per chart review patient has a chronic infection of the right knee. Patient has been offered above-the-knee amputation several times by Orthopedic surgery which he has declined. Infectious Disease service was consulted as well. Pt is well known to Dr. Hutchison's service. Blood cultures have been negative.  Right knee abscess culture from 11/29 showing similar bacteria moderate growth of Streptococcus agalactiae.        Patient was taken to operating room by orthopedics with Dr. Shen on 11/30 revealing gross purulence throughout the knee joint and hardware.  Also retained foreign bodies from sponge changes.  Patient underwent right knee arthrotomy and removal of foreign body.  Patient was taken back to operating room on 12/1/23 due to the gross amount of purulence and underwent incision and drainage of right distal femur osteomyelitis with open bone window, removal of hardware deep right femur and implant of antibiotic spacer Stimulan beads. Intraoperative culture from 11/30 grew out E.coli, GBS, Enterococcus faecalis. No anaerobes isolated.  Infectious Disease changed antibiotics to IV Ampicillin 2 gram q4h.     Patient refused PICC line placement after the PICC nurse discouraged him from having it placed.  Patient also refused midline placement.  Apparently general surgery discouraged the patient from having a temporary central venous catheter placed.  Interventional Radiology was consulted and they refused to place a central venous catheter.  We then consulted Vascular Surgery for assistance.  I spoke with Dr. Yassine Agosto with vascular surgery today and he evaluated the patient and apparently he has convinced the patient to re-attempt PICC line placement which has been completed.  Patient will be discharged to [facility] long-term acute [facility] hospital to continue a 6 week course of intravenous ampicillin,  Rocephin, and oral doxycycline with a tentative end date of 01/15/2024.  Patient will then require chronic oral suppressive antibiotic therapy with ampicillin and doxycycline upon completion of intravenous antibiotic therapy.    Patient was seen and examined on the day of discharge.      Vitals:  VITAL SIGNS: 24 HRS MIN & MAX LAST   Temp  Min: 97.9 °F (36.6 °C)  Max: 98.9 °F (37.2 °C) 97.9 °F (36.6 °C)   BP  Min: 100/63  Max: 178/90 100/63   Pulse  Min: 74  Max: 92  90   Resp  Min: 16  Max: 20 16   SpO2  Min: 94 %  Max: 99 % 96 %       Physical Exam:  General: in no acute distress  HENT: normocephalic, atraumatic  Eye: PERRL, EOMI, clear conjunctiva  Neck: full ROM, no thyromegaly, no JVD  Respiratory: clear to auscultation bilaterally  Cardiovascular: regular rate and rhythm  Gastrointestinal: non-distended, positive bowel sounds, non-tender  Genitourinary:  Suprapubic catheter  Musculoskeletal:  Dressing in place on right knee  Integumentary: warm, dry, intact, no rashes  Neurological: cranial nerves grossly intact, paraplegic  Psychiatric: cooperative, flat affect, depressed appearing    Procedures Performed: No admission procedures for hospital encounter.     Significant Diagnostic Studies: See Full reports for all details    Recent Labs   Lab 12/03/23  0601 12/04/23  0352 12/05/23  0438   WBC 10.51 7.99 7.57   RBC 3.45* 3.57* 3.79*   HGB 8.4* 8.9* 9.4*   HCT 27.3* 27.8* 29.8*   MCV 79.1* 77.9* 78.6*   MCH 24.3* 24.9* 24.8*   MCHC 30.8* 32.0* 31.5*   RDW 17.7* 17.3* 17.5*   * 455* 520*   MPV 9.6 9.1 9.2       Recent Labs   Lab 12/03/23  0601      K 4.7   CO2 30*   BUN 4.6*   CREATININE 0.70*   CALCIUM 9.2   MG 1.90        Microbiology Results (last 7 days)       Procedure Component Value Units Date/Time    Blood Culture [[id number]]  (Normal) Collected: 11/29/23 1257    Order Status: Completed Specimen: Blood Updated: 12/04/23 1401     CULTURE, BLOOD ([facility]) No Growth at 5 days    Blood Culture  [[id number]]  (Normal) Collected: 11/29/23 1257    Order Status: Completed Specimen: Blood Updated: 12/04/23 1401     CULTURE, BLOOD ([facility]) No Growth at 5 days    Wound Culture [[id number]]  (Abnormal)  (Susceptibility) Collected: 11/30/23 1431    Order Status: Completed Specimen: Abscess from Knee, Right Updated: 12/04/23 1010     Wound Culture Few Escherichia coli      Few Enterococcus faecalis      Few Streptococcus agalactiae (Group B)    Wound Culture [[id number]]  (Abnormal)  (Susceptibility) Collected: 11/29/23 1536    Order Status: Completed Specimen: Abscess from Knee, Right Updated: 12/03/23 0808     Wound Culture Moderate Streptococcus agalactiae (Group B)      Moderate Enterococcus faecalis    Anaerobic Culture [[id number]] Collected: 11/30/23 1431    Order Status: Completed Specimen: Abscess from Knee, Right Updated: 12/03/23 0713     Anaerobe Culture No Anaerobes Isolated             X-Ray Chest 1 View for Line/Tube Placement  Narrative: EXAMINATION:  XR CHEST 1 VIEW FOR LINE/TUBE PLACEMENT    CLINICAL HISTORY:  picc placement;    COMPARISON:  30 November 2023    FINDINGS:  Frontal view of the chest was obtained. Right PICC tip overlies the mid to distal SVC.  The heart is not enlarged.  Grossly clear lungs.  No pneumothorax.  Impression: Right PICC tip overlies the mid to distal SVC.    Electronically signed by: Mikal White  Date:    12/08/2023  Time:    13:46         Medication List        ASK your doctor about these medications      ALPRAZolam 0.25 MG tablet  Commonly known as: XANAX  Take 1 tablet (0.25 mg total) by mouth 2 (two) times daily as needed for Anxiety (PRN Anxiety).     amLODIPine 10 MG tablet  Commonly known as: NORVASC  Take 1 tablet (10 mg total) by mouth once daily.     atorvastatin 20 MG tablet  Commonly known as: LIPITOR  Take 1 tablet (20 mg total) by mouth nightly.     carvediloL 12.5 MG tablet  Commonly known as: COREG  Ask about: Which instructions should I use?     cloNIDine  0.1 MG tablet  Commonly known as: CATAPRES  Take 1 tablet (0.1 mg total) by mouth 3 (three) times daily.     docusate sodium 100 MG capsule  Commonly known as: COLACE  Take 1 capsule (100 mg total) by mouth 2 (two) times daily.     doxycycline 100 MG tablet  Commonly known as: VIBRA-TABS  Take 1 tablet (100 mg total) by mouth every 12 (twelve) hours.     famotidine 20 MG tablet  Commonly known as: PEPCID  Take 1 tablet (20 mg total) by mouth 2 (two) times daily.     FENOFIBRATE NANOCRYSTALLIZED ORAL  Ask about: Which instructions should I use?     FLUoxetine 20 MG capsule  1 capsule (20 mg total) by Per G Tube route once daily.     gentamicin 0.1 % ointment  Commonly known as: GARAMYCIN     hydrALAZINE 100 MG tablet  Commonly known as: APRESOLINE  1 tablet (100 mg total) by Per G Tube route every 8 (eight) hours.     hydrOXYzine pamoate 50 MG Cap  Commonly known as: VISTARIL  Take 1 capsule (50 mg total) by mouth every evening.     lisinopriL 20 MG tablet  Commonly known as: PRINIVIL,ZESTRIL  Take 1 tablet (20 mg total) by mouth once daily.     oxybutynin 5 MG Tab  Commonly known as: DITROPAN  1 tablet (5 mg total) by Per NG tube route 2 (two) times daily.     oxyCODONE-acetaminophen  mg per tablet  Commonly known as: PERCOCET  Take 1 tablet by mouth every 6 (six) hours as needed for Pain.     TEMAZEPAM ORAL     traZODone 100 MG tablet  Commonly known as: DESYREL  1 tablet (100 mg total) by Per G Tube route every evening.     XARELTO 20 mg Tab  Generic drug: rivaroxaban               Explained in detail to the patient about the discharge plan, medications, and follow-up visits. Pt understands and agrees with the treatment plan  Discharge Disposition: [facility]  Discharged Condition: stable  Diet-   Dietary Orders (From admission, onward)       Start     Ordered    12/04/23 1134  Dietary nutrition supplements Malachi - Orange; BID  Continuous        Question Answer Comment   Select PO Supplement: Malachi - Orange     Frequency: BID        12/04/23 1134    12/01/23 1251  Diet Adult Regular Diabetic  Diet effective now        Question:  Diet Modifier:  Answer:  Diabetic    12/01/23 1250                  Discharge time 35 minutes    Karri Diaz M.D, on 12/8/2023. at 2:02 PM.

## 2023-12-08 NOTE — PROGRESS NOTES
Infectious Diseases Progress Note  58-year-old male with past medical history of T-spine cord injury with paraplegia, diabetes, HTN, hepatitis-C, chronic MRSA L ankle wound/osteomyelitis, was on suppressive doxycycline, known to my service, seen by us initially at [facility] and has followed with us at the office for chronic osteomyelitis, most recently seen during his prolonged hospital admission of 06/28/2023, this same facility, [facility] when he had presented with complaints of pain and swelling to his right knee, apparently struck his knee.  He did also report prior remote right knee surgery.  He was evaluated and noted to have no fevers initially but a temperature of 100.2° today 6/29, no leukocytosis but with thrombocytosis of up to 531 today.  .2 and ESR >130.  CT scan of the right knee at the time noted a 5 cm area of subcutaneous fluid collection in the prepatellar region.  There was aspiration with findings of purulent fluid and cultures showing group B Streptococcus.  He was seen by the orthopedic surgery team with findings of right prepatellar bursa abscess and had incision and drainage of right knee septic arthritis and right prepatellar bursa abscess on 6/29 with cultures yielding group B Streptococcus.  He was treated with a prolonged course of ceftriaxone.  That hospital stay was complicated with acute kidney injury and acute hypoxic respiratory failure with pulmonary edema, pleural effusions and pneumonitis requiring prolonged ICU management on ventilator with tracheostomy and PEG tube placement on 08/06.  He was subsequently stabilized and discharged to LTAC on 08/09 and then discharged home from LTAC on 10/03.  He is admitted this time on 11/29/2023 presenting through the ED with complaints of right knee purulent drainage.  He was evaluated and noted to have no fevers but with leukocytosis of 17.28, ESR 65, .1, abnormal renal function with  creatinine of 1.29, anemic with low albumin.  Urine toxicology test positive for cocaine, benzodiazepines and opiates.  Blood cultures have been negative.  Right knee abscess culture from 11/29 showing moderate Streptococcus agalactiae and Enterococcus. X-ray of the right knee showed soft tissue swelling with no acute abnormality.  X-ray of the femur showed internal fixation with intact hardware and significant deformity of distal femur from remote trauma.  He was seen by ortho team of Dr. Shne, taken to surgery today 11/30 for right knee arthrotomy and removal of foreign body due to infection with cultures revealing GBS, Enterococcus and Ecoli.   He is currently on antibiotic coverage with Ampicillin, ceftriaxone and oral doxycycline     Subjective:  Lying in bed in no acute distress. No new complaints voiced. Afebrile. Wife present    ROS  Constitutional:  Positive for malaise/fatigue.   HENT: Negative.     Respiratory: Negative.     Gastrointestinal: Negative.    Genitourinary: Negative.    Musculoskeletal: Negative.         Right knee chronic infection with draining wound   Neurological:  Positive for focal weakness and weakness.   Endo/Heme/Allergies: Negative.    Psychiatric/Behavioral: Negative.     All other Systems review done and negative    Review of patient's allergies indicates:   Allergen Reactions    Baclofen Itching and Anxiety       Past Medical History:   Diagnosis Date    Arthritis     Chronic ulcer of ankle 05/26/2022    Frequent UTI 07/02/2019    Generalized anxiety disorder 05/26/2022    Neurogenic bladder 05/26/2022    Osteomyelitis 05/26/2022    Presence of suprapubic catheter 05/26/2022    Pure hypercholesterolemia 05/26/2022    Retention of urine, unspecified 08/09/2019    Spinal cord injury at T1-T6 level 04/20/2018       Past Surgical History:   Procedure Laterality Date    FRACTURE SURGERY  2021    INCISION AND DRAINAGE, LOWER EXTREMITY Right 06/29/2023    Procedure: INCISION AND  DRAINAGE, LOWER EXTREMITY;  Surgeon: Prabhu Shen DO;  Location: [facility] OR;  Service: Orthopedics;  Laterality: Right;  supine bone foam wash stuff cultures    INCISION AND DRAINAGE, LOWER EXTREMITY Right 2023    Procedure: INCISION AND DRAINAGE, LOWER EXTREMITY;  Surgeon: Prabhu Shen DO;  Location: [facility] OR;  Service: Orthopedics;  Laterality: Right;  supine any table bone foam wash stuff possible wound vac    INCISION AND DRAINAGE, LOWER EXTREMITY Right 2023    Procedure: INCISION AND DRAINAGE, LOWER EXTREMITY;  Surgeon: Prabhu Shen DO;  Location: [facility] OR;  Service: Orthopedics;  Laterality: Right;  supine bone foam vascular bed removal of distal femoral plate, wash stuff, possible AKA    INSERTION OF INTRAMEDULLARY MARISA Right 08/10/2022    Procedure: INSERTION, INTRAMEDULLARY MARISA RIGHT TIBIA;  Surgeon: Jorge Orellana MD;  Location: [facility] OR;  Service: Orthopedics;  Laterality: Right;    JOINT REPLACEMENT      Ankel    REMOVAL OF HARDWARE FROM LOWER EXTREMITY Right 2023    Procedure: REMOVAL, HARDWARE, LOWER EXTREMITY;  Surgeon: Prabhu Shen DO;  Location: [facility] OR;  Service: Orthopedics;  Laterality: Right;    SPINE SURGERY  2018       Social History     Socioeconomic History    Marital status:    Tobacco Use    Smoking status: Some Days     Current packs/day: 0.00     Average packs/day: 0.2 packs/day for 41.5 years (10.4 ttl pk-yrs)     Types: Cigars, Cigarettes     Start date: 1982     Last attempt to quit: 2023     Years since quittin.3    Smokeless tobacco: Never   Substance and Sexual Activity    Alcohol use: Not Currently     Alcohol/week: 2.0 standard drinks of alcohol     Types: 2 Cans of beer per week    Drug use: Not Currently     Types: Oxycodone    Sexual activity: Not Currently     Partners: Female     Birth control/protection: None     Social Determinants of Health     Financial Resource Strain: Low Risk  (2023)    Overall Financial  Resource Strain (CARDIA)     Difficulty of Paying Living Expenses: Not very hard   Food Insecurity: No Food Insecurity (8/9/2023)    Hunger Vital Sign     Worried About Running Out of Food in the Last Year: Never true     Ran Out of Food in the Last Year: Never true   Transportation Needs: No Transportation Needs (8/9/2023)    PRAPARE - Transportation     Lack of Transportation (Medical): No     Lack of Transportation (Non-Medical): No   Physical Activity: Inactive (8/9/2023)    Exercise Vital Sign     Days of Exercise per Week: 0 days     Minutes of Exercise per Session: 0 min   Stress: No Stress Concern Present (8/9/2023)    [employer] [employer] - Occupational Stress Questionnaire     Feeling of Stress : Only a little   Social Connections: Moderately Isolated (10/3/2023)    Social Connection and Isolation Panel [NHANES]     Frequency of Communication with Friends and Family: Twice a week     Frequency of Social Gatherings with Friends and Family: Twice a week     Attends Jehovah's witness Services: Never     Active Member of Clubs or Organizations: No     Attends Club or Organization Meetings: Never     Marital Status:    Housing Stability: Low Risk  (8/9/2023)    Housing Stability Vital Sign     Unable to Pay for Housing in the Last Year: No     Number of Places Lived in the Last Year: 1     Unstable Housing in the Last Year: No         Scheduled Meds:   amLODIPine  10 mg Oral Daily    ampicillin IVPB  2 g Intravenous Q4H    atorvastatin  20 mg Oral Nightly    B12-levomefolate calcium-B6  1 tablet Oral Daily    carvediloL  25 mg Oral BID WM    cefTRIAXone (ROCEPHIN) IVPB  2 g Intravenous Q24H    docusate sodium  200 mg Oral BID    doxycycline  100 mg Oral Q12H    enoxaparin  40 mg Subcutaneous Daily    ferrous sulfate  1 tablet Oral BID    FLUoxetine  20 mg Oral Daily    guaiFENesin  600 mg Oral BID    methocarbamoL  750 mg Oral Q6H    nicotine  1 patch Transdermal Daily    oxybutynin  5 mg  "Oral BID     Continuous Infusions:  PRN Meds:dextrose 10%, dextrose 10%, glucagon (human recombinant), glucose, glucose, hydrALAZINE, insulin aspart U-100, labetaloL, ondansetron, oxyCODONE-acetaminophen, oxyCODONE-acetaminophen, temazepam, zolpidem    Objective:  BP (!) 158/79   Pulse 75   Temp 98.9 °F (37.2 °C) (Oral)   Resp 18   Ht 5' 9" (1.753 m)   Wt 72.6 kg (160 lb)   SpO2 99%   BMI 23.63 kg/m²     Physical Exam:   Physical Exam  Vitals reviewed.   Constitutional:       General: He is not in acute distress.  HENT:      Head: Normocephalic and atraumatic.   Cardiovascular:      Rate and Rhythm: Normal rate and regular rhythm.      Heart sounds: Normal heart sounds.   Pulmonary:      Effort: Pulmonary effort is normal. No respiratory distress.      Breath sounds: Normal breath sounds.   Abdominal:      General: Bowel sounds are normal. There is no distension.      Palpations: Abdomen is soft.      Tenderness: There is no abdominal tenderness.   Genitourinary:     Comments: Stover catheter in place  Musculoskeletal:         General: No deformity.      Cervical back: Neck supple.      Comments: Contracted lower extremities   Skin:     Findings: No erythema or rash.      Comments: Right knee is dressed    Neurological:      Mental Status: He is alert and oriented to person, place, and time.   Psychiatric:      Comments: Calm and cooperative    Imaging      Lab Review   Recent Results (from the past 24 hour(s))   POCT glucose    Collection Time: 12/07/23 11:37 AM   Result Value Ref Range    POCT Glucose 104 70 - 110 mg/dL       Assessment/Plan:  1.  Sepsis with leukocytosis  2.  Chronic right knee infection with septic arthritis and osteomyelitis of the femur - GBS / Enterococcus / Ecoli isolates  3. Acute kidney injury/chronic kidney disease  4.  Elevated ESR, CRP  5.  Cocaine / tobacco use  6.  Diabetes type 2  7.  Anemia   8.  Protein calorie malnutrition  9.  Paraplegia   10. Chronic MRSA L ankle/foot " osteomyelitis with retained hardware     -Continue Ampicillin #7, Ceftriaxone #4 and chronic suppression with oral Doxycycline. Plan a 6 week course following inflammatory markers (End date 1/15/24)  -Will need chronic oral suppressive antibiotic therapy with ampicillin and doxycycline upon completion of IV antibiotics  -Afebrile without leukocytosis, follow  -12/2 ESR > 130 from 65 and .8 from 211.10  -Ortho on board, inputs noted  -S/P R knee arthrotomy with removal of foreign body on 11/30, cultures revealed Streptococcus agalactiae, Ecoli and Enterococcus  -S/P I&D R femur with removal of hardware and placement of antibiotic spacer  -R knee abscess culture from 11/29 revealed moderate Streptococcus agalactiae and Enterococcus  -Discussed with patient and nursing. CM to work on discharge home with HH and OPAT.  Issues with getting IV access noted, patient refusing PICC line or midline, vascular surgery consulted, follow.  -  He will follow up with us as an outpatient about 2 weeks post discharge

## 2023-12-08 NOTE — PROGRESS NOTES
[facility] Hospital Medicine Progress Note        Chief Complaint: Inpatient Follow-up for right knee septic arthritis        HPI:   Patient is a 58-year-old  male with PMHx of cocaine abuse, HTN, DM II,  Afib (on Xarelto), paraplegia secondary to T- spine cord injury( T1-T6), neurogenic bladder with chronic suprapubic catheter, hepatitis-C, chronic right ankle wound/osteomyelitis, presented to the ED with right knee pain with purulent drainage which apparently started this morning. He was treated for right patellar bursa abscess and S/P I&D on 6/29/23. Cultures at that time grew streptococcus agalactiae. He's on chronic suppressive therapy with oral doxycycline.      Vital signs on arrival: T 97.9, P 83, R 20, B/P 96/56, sats 98% on room air. Initial labs include WBC 17.28, Hgb 11.1, Hct 36.6, BUN 12.6, creatinine 1.29, ESR 65. X-ray of the right knee showed soft tissue swelling with no acute abnormality.  X-ray of the femur showed internal fixation with intact hardware and significant deformity of distal femur from remote trauma. Meds given in ED include Zosyn and Vancomycin.     Orthopedic surgery was consulted for right septic knee.  Per chart review patient has a chronic infection of the right knee. Patient has been offered above-the-knee amputation several times by Orthopedic surgery which he has declined. Infectious Disease service was consulted as well. Pt is well known to Dr. Hutchison's service. Blood cultures have been negative.  Right knee abscess culture from 11/29 showing similar bacteria moderate growth of Streptococcus agalactiae.        Pt was taken to operating room by orthopedics Dr. Shen on 11/30 revealing gross purulence throughout the knee joint and hardware.  Also retained foreign bodies from sponge changes.  Patient underwent right knee arthrotomy and removal of foreign body.  Patient was taken back to operating room on 12/1/23 due to the gross amount  of purulence and underwent incision and drainage of right distal femur osteomyelitis with open bone window, removal of hardware deep right femur and implant of antibiotic spacer Stimulan beads. Intraoperative culture from 11/30 grew out E.coli, GBS, Enterococcus faecalis. No anaerobes isolated.  Infectious Disease changed antibiotics to IV Ampicillin 2 gram q4h.        Interval Hx:   Patient is sitting up in a wheelchair.  He has a couple of visitors present.  No new issues have been reported.  Patient is afebrile, room air, and hemodynamically stable. Case was discussed with  on the floor.  Patient has been accepted to long-term acute care pending central venous access placement.      Objective/physical exam:  General: in no acute distress  HENT: normocephalic, atraumatic  Eye: PERRL, EOMI, clear conjunctiva  Neck: full ROM, no thyromegaly, no JVD  Respiratory: clear to auscultation bilaterally  Cardiovascular: regular rate and rhythm  Gastrointestinal: non-distended, positive bowel sounds, non-tender  Genitourinary:  Suprapubic catheter  Musculoskeletal:  Dressing in place on right knee  Integumentary: warm, dry, intact, no rashes  Neurological: cranial nerves grossly intact, paraplegic  Psychiatric: cooperative, flat affect, depressed appearing      VITAL SIGNS: 24 HRS MIN & MAX LAST   Temp  Min: 97.9 °F (36.6 °C)  Max: 98.9 °F (37.2 °C) 97.9 °F (36.6 °C)   BP  Min: 100/63  Max: 178/90 100/63   Pulse  Min: 74  Max: 92  90   Resp  Min: 16  Max: 20 16   SpO2  Min: 94 %  Max: 99 % 96 %     I have reviewed the following labs:  Recent Labs   Lab 12/03/23  0601 12/04/23  0352 12/05/23  0438   WBC 10.51 7.99 7.57   RBC 3.45* 3.57* 3.79*   HGB 8.4* 8.9* 9.4*   HCT 27.3* 27.8* 29.8*   MCV 79.1* 77.9* 78.6*   MCH 24.3* 24.9* 24.8*   MCHC 30.8* 32.0* 31.5*   RDW 17.7* 17.3* 17.5*   * 455* 520*   MPV 9.6 9.1 9.2     Recent Labs   Lab 12/03/23  0601      K 4.7   CO2 30*   BUN 4.6*   CREATININE 0.70*    CALCIUM 9.2   MG 1.90     Microbiology Results (last 7 days)       Procedure Component Value Units Date/Time    Blood Culture [[id number]]  (Normal) Collected: 11/29/23 1257    Order Status: Completed Specimen: Blood Updated: 12/04/23 1401     CULTURE, BLOOD ([facility]) No Growth at 5 days    Blood Culture [[id number]]  (Normal) Collected: 11/29/23 1257    Order Status: Completed Specimen: Blood Updated: 12/04/23 1401     CULTURE, BLOOD ([facility]) No Growth at 5 days    Wound Culture [[id number]]  (Abnormal)  (Susceptibility) Collected: 11/30/23 1431    Order Status: Completed Specimen: Abscess from Knee, Right Updated: 12/04/23 1010     Wound Culture Few Escherichia coli      Few Enterococcus faecalis      Few Streptococcus agalactiae (Group B)    Wound Culture [[id number]]  (Abnormal)  (Susceptibility) Collected: 11/29/23 1536    Order Status: Completed Specimen: Abscess from Knee, Right Updated: 12/03/23 0808     Wound Culture Moderate Streptococcus agalactiae (Group B)      Moderate Enterococcus faecalis    Anaerobic Culture [[id number]] Collected: 11/30/23 1431    Order Status: Completed Specimen: Abscess from Knee, Right Updated: 12/03/23 0713     Anaerobe Culture No Anaerobes Isolated             See below for Radiology    Scheduled Med:   amLODIPine  10 mg Oral Daily    ampicillin IVPB  2 g Intravenous Q4H    atorvastatin  20 mg Oral Nightly    B12-levomefolate calcium-B6  1 tablet Oral Daily    carvediloL  25 mg Oral BID WM    cefTRIAXone (ROCEPHIN) IVPB  2 g Intravenous Q24H    docusate sodium  200 mg Oral BID    doxycycline  100 mg Oral Q12H    enoxaparin  40 mg Subcutaneous Daily    ferrous sulfate  1 tablet Oral BID    FLUoxetine  20 mg Oral Daily    guaiFENesin  600 mg Oral BID    hydrALAZINE  50 mg Oral Q8H    methocarbamoL  750 mg Oral Q6H    nicotine  1 patch Transdermal Daily    oxybutynin  5 mg Oral BID      Continuous Infusions:  None.       PRN Meds:  dextrose 10%, dextrose 10%, glucagon (human  recombinant), glucose, glucose, hydrALAZINE, insulin aspart U-100, labetaloL, ondansetron, oxyCODONE-acetaminophen, oxyCODONE-acetaminophen, temazepam, zolpidem     Assessment/Plan:  Sepsis due to Right knee septic arthritis, chronic Right distal femur osteomyelitis, right femur infected hardware status post Right knee arthrotomy and removal of foreign body (11/30/23) and incision and drainage of right distal femur osteomyelitis with open bone window, removal of hardware deep right femur and implant of antibiotic spacer Stimulan beads (12/1/23)  Anemia of chronic disease  Cocaine abuse  Non-insulin-dependent type 2 diabetes mellitus  Moderate protein calorie malnutrition  Paraplegia      History of recurrent bacterial cystitis, generalized anxiety disorder, Neurogenic bladder, Presence of Suprapubic catheter,        Plan:  Patient refused PICC line placement.  Patient refused midline placement.  Apparently general surgery discouraged the patient from having a temporary central venous catheter placed.  Interventional Radiology was consulted and they have refused to place a central venous catheter.  Now we have consulted Vascular Surgery for assistance.  I spoke with Dr. Yassine Agosto with vascular surgery today and he evaluated the patient and apparently has convinced the patient to re-attempt PICC line placement.  If this is unsuccessful then he will attempt to place another form of central venous access.  We greatly appreciate his assistance.      Continue current antibiotic regimen as outlined by Infectious Disease    Continue appropriate home medications and other supportive care while awaiting placement    VTE prophylaxis: Lovenox      Patient condition:  Stable     Anticipated discharge and Disposition:   Long-term acute [facility] after central venous access is obtained      All diagnosis and differential diagnosis have been reviewed; assessment and plan has been documented; I have personally reviewed the  labs and test results that are presently available; I have reviewed the patients medication list; I have reviewed the consulting providers response and recommendations. I have reviewed or attempted to review medical records based upon their availability    All of the patient's questions have been  addressed and answered. Patient's is agreeable to the above stated plan. I will continue to monitor closely and make adjustments to medical management as needed.  ___________________    Karri Corbin MD   12/08/2023 easy go

## 2023-12-09 NOTE — PLAN OF CARE
Problem: Adult Inpatient Plan of Care  Goal: Plan of Care Review  Outcome: Met  Goal: Patient-Specific Goal (Individualized)  Outcome: Met  Goal: Absence of Hospital-Acquired Illness or Injury  Outcome: Met  Goal: Optimal Comfort and Wellbeing  Outcome: Met  Goal: Readiness for Transition of Care  Outcome: Met     Problem: Infection  Goal: Absence of Infection Signs and Symptoms  Outcome: Met     Problem: Impaired Wound Healing  Goal: Optimal Wound Healing  Outcome: Met     Problem: Diabetes Comorbidity  Goal: Blood Glucose Level Within Targeted Range  Outcome: Met     Problem: Skin Injury Risk Increased  Goal: Skin Health and Integrity  Outcome: Met     Problem: Fall Injury Risk  Goal: Absence of Fall and Fall-Related Injury  Outcome: Met

## 2023-12-11 NOTE — PHYSICIAN QUERY
"PT Name: Bianca Khan  MR #: [id number]    DOCUMENTATION CLARIFICATION     CDS/: Anai Marie RN          Contact Information: [email or web address]   This form is a permanent document in the medical record.    Query Date: December 11, 2023  By submitting this query, we are merely seeking further clarification of documentation. Please utilize your independent clinical judgment when addressing the question(s) below.    The Medical Record contains the following:   Indicator Supporting Clinical Findings Location in Medical Record    Documentation of "Debridement" Attention is drawn to the right knee.  I extended the previous made incision proximally along the plate.  We then removed the hardware without complication.  Patient had multiple areas concerning for osteomyelitis and soft bone cortex.  Patient has obvious signs of infection proximally and distally.  I then placed a guidewire through the intercondylar notch of the femur using ZeroFOX surgical technique guide.  We then placed an opening Reamer followed by MADI from [place] to debride the intramedullary canal. 12/1 Op Note    Documentation of "I&D" Procedure:  Incision and drainage right femur osteomyelitis with open bone window  12/1 Op Note     Other       Excisional debridement is the surgical removal or cutting away of such tissue, necrosis, or slough and is classified to the root operation "Excision." Use of a sharp instrument does not always indicate that an excisional debridement was performed. Minor removal of loose fragments with scissors or using a sharp instrument to scrape away tissue is not an excisional debridement. Excisional debridement involves the use of a scalpel to remove devitalized tissue.  Nonexcisional debridement is the nonoperative brushing, irrigating, scrubbing, or washing of devitalized tissue, necrosis, slough, or foreign material. Most nonexcisional debridement procedures are classified to the root operation "Extraction" " (pulling or stripping out or off all or a portion of a body part by the use of force).     Provider, please provide further clarification on the debridement procedure performed on Right Knee:    [   x] Excisional Debridement of bone- the MADI removes bone from the femur        [   ] Non-excisional Debridement of bone     [   ] Other Procedure (please specify): _____________     Reference:    ICD-10-CM/PCS Coding Clinic Third Quarter ICD-10, Effective with discharges: October 7, 2015 [facility] § Excisional and nonexcisional debridement (2015).    Form No. 99069

## 2023-12-11 NOTE — ANESTHESIA POSTPROCEDURE EVALUATION
Anesthesia Post Evaluation    Patient: Bianca Khan    Procedure(s) Performed: Procedure(s) (LRB):  INCISION AND DRAINAGE, LOWER EXTREMITY (Right)  REMOVAL, HARDWARE, LOWER EXTREMITY (Right)    Final Anesthesia Type: general (/Regional//MAC)      Patient location during evaluation: PACU  Post-procedure mental status: @ basline.  Pain management: adequate    PONV status: See postop meds for drugs used to control n/v if any.  Anesthetic complications: no      Cardiovascular status: blood pressure returned to baseline  Respiratory status: @ baseline.  Hydration status: euvolemic                Vitals Value Taken Time   /81 12/11/23 0824   Temp 36.3 °C (97.4 °F) 12/11/23 0824   Pulse 92 12/11/23 0824   Resp 18 12/11/23 0824   SpO2 94 % 12/11/23 0824         Event Time   Out of Recovery 12/01/2023 13:30:00         Pain/Adriana Score: Pain Rating Prior to Med Admin: 9 (12/11/2023  5:43 AM)  Pain Rating Post Med Admin: 2 (12/10/2023 11:43 PM)

## 2023-12-26 ENCOUNTER — TELEPHONE (OUTPATIENT)
Dept: ORTHOPEDICS | Facility: CLINIC | Age: 58
End: 2023-12-26
Payer: MEDICARE

## 2024-01-02 LAB — FUNGUS SPEC CULT: NORMAL

## 2024-01-08 PROBLEM — J96.01 ACUTE RESPIRATORY FAILURE WITH HYPOXEMIA: Status: RESOLVED | Noted: 2023-08-09 | Resolved: 2024-01-08

## 2024-01-30 ENCOUNTER — OFFICE VISIT (OUTPATIENT)
Dept: ORTHOPEDICS | Facility: CLINIC | Age: 59
End: 2024-01-30
Payer: MEDICARE

## 2024-01-30 ENCOUNTER — HOSPITAL ENCOUNTER (OUTPATIENT)
Dept: RADIOLOGY | Facility: CLINIC | Age: 59
Discharge: HOME | End: 2024-01-30
Attending: ORTHOPAEDIC SURGERY
Payer: MEDICARE

## 2024-01-30 VITALS
DIASTOLIC BLOOD PRESSURE: 81 MMHG | BODY MASS INDEX: 25.63 KG/M2 | RESPIRATION RATE: 18 BRPM | WEIGHT: 173.06 LBS | HEIGHT: 69 IN | HEART RATE: 74 BPM | TEMPERATURE: 98 F | SYSTOLIC BLOOD PRESSURE: 139 MMHG

## 2024-01-30 DIAGNOSIS — M71.061 ABSCESS OF BURSA OF RIGHT KNEE: ICD-10-CM

## 2024-01-30 DIAGNOSIS — M70.41 BURSITIS, PREPATELLAR, RIGHT: Primary | ICD-10-CM

## 2024-01-30 PROCEDURE — 73560 X-RAY EXAM OF KNEE 1 OR 2: CPT | Mod: RT,,, | Performed by: ORTHOPAEDIC SURGERY

## 2024-01-30 PROCEDURE — 99024 POSTOP FOLLOW-UP VISIT: CPT | Mod: POP,,, | Performed by: ORTHOPAEDIC SURGERY
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
## 2024-02-14 ENCOUNTER — LAB REQUISITION (OUTPATIENT)
Dept: LAB | Facility: HOSPITAL | Age: 59
End: 2024-02-14
Payer: MEDICARE

## 2024-02-14 DIAGNOSIS — E44.0 MODERATE PROTEIN-CALORIE MALNUTRITION: ICD-10-CM

## 2024-02-14 DIAGNOSIS — E11.22 TYPE 2 DIABETES MELLITUS WITH DIABETIC CHRONIC KIDNEY DISEASE: ICD-10-CM

## 2024-02-14 DIAGNOSIS — N18.2 CHRONIC KIDNEY DISEASE, STAGE 2 (MILD): ICD-10-CM

## 2024-02-14 DIAGNOSIS — M19.90 UNSPECIFIED OSTEOARTHRITIS, UNSPECIFIED SITE: ICD-10-CM

## 2024-02-14 DIAGNOSIS — B95.1 STREPTOCOCCUS, GROUP B, AS THE CAUSE OF DISEASES CLASSIFIED ELSEWHERE: ICD-10-CM

## 2024-02-14 DIAGNOSIS — I12.9 HYPERTENSIVE CHRONIC KIDNEY DISEASE WITH STAGE 1 THROUGH STAGE 4 CHRONIC KIDNEY DISEASE, OR UNSPECIFIED CHRONIC KIDNEY DISEASE: ICD-10-CM

## 2024-02-14 DIAGNOSIS — I25.10 ATHEROSCLEROTIC HEART DISEASE OF NATIVE CORONARY ARTERY WITHOUT ANGINA PECTORIS: ICD-10-CM

## 2024-02-14 DIAGNOSIS — I48.91 UNSPECIFIED ATRIAL FIBRILLATION: ICD-10-CM

## 2024-02-14 DIAGNOSIS — D50.9 IRON DEFICIENCY ANEMIA, UNSPECIFIED: ICD-10-CM

## 2024-02-14 LAB
ALBUMIN SERPL-MCNC: 3.2 G/DL (ref 3.4–4.8)
ALBUMIN/GLOB SERPL: 0.8 RATIO (ref 1.1–2)
ALP SERPL-CCNC: 87 UNIT/L (ref 40–150)
ALT SERPL-CCNC: 6 UNIT/L (ref 0–55)
AST SERPL-CCNC: 13 UNIT/L (ref 5–34)
BASOPHILS # BLD AUTO: 0.03 X10(3)/MCL
BASOPHILS NFR BLD AUTO: 0.5 %
BILIRUB SERPL-MCNC: 0.2 MG/DL
BUN SERPL-MCNC: 15.5 MG/DL (ref 8.4–25.7)
CALCIUM SERPL-MCNC: 9 MG/DL (ref 8.8–10)
CHLORIDE SERPL-SCNC: 109 MMOL/L (ref 98–107)
CO2 SERPL-SCNC: 22 MMOL/L (ref 23–31)
CREAT SERPL-MCNC: 1.03 MG/DL (ref 0.73–1.18)
CRP SERPL-MCNC: 14.7 MG/L
EOSINOPHIL # BLD AUTO: 0.1 X10(3)/MCL (ref 0–0.9)
EOSINOPHIL NFR BLD AUTO: 1.6 %
ERYTHROCYTE [DISTWIDTH] IN BLOOD BY AUTOMATED COUNT: 18.6 % (ref 11.5–17)
ERYTHROCYTE [SEDIMENTATION RATE] IN BLOOD: 54 MM/HR (ref 0–15)
GFR SERPLBLD CREATININE-BSD FMLA CKD-EPI: >60 MLS/MIN/1.73/M2
GLOBULIN SER-MCNC: 4.2 GM/DL (ref 2.4–3.5)
GLUCOSE SERPL-MCNC: 106 MG/DL (ref 82–115)
HCT VFR BLD AUTO: 36.9 % (ref 42–52)
HGB BLD-MCNC: 11.3 G/DL (ref 14–18)
IMM GRANULOCYTES # BLD AUTO: 0.01 X10(3)/MCL (ref 0–0.04)
IMM GRANULOCYTES NFR BLD AUTO: 0.2 %
LYMPHOCYTES # BLD AUTO: 2.96 X10(3)/MCL (ref 0.6–4.6)
LYMPHOCYTES NFR BLD AUTO: 46.3 %
MCH RBC QN AUTO: 24.9 PG (ref 27–31)
MCHC RBC AUTO-ENTMCNC: 30.6 G/DL (ref 33–36)
MCV RBC AUTO: 81.5 FL (ref 80–94)
MONOCYTES # BLD AUTO: 0.61 X10(3)/MCL (ref 0.1–1.3)
MONOCYTES NFR BLD AUTO: 9.5 %
NEUTROPHILS # BLD AUTO: 2.69 X10(3)/MCL (ref 2.1–9.2)
NEUTROPHILS NFR BLD AUTO: 41.9 %
NRBC BLD AUTO-RTO: 0 %
PLATELET # BLD AUTO: 394 X10(3)/MCL (ref 130–400)
PMV BLD AUTO: 9.5 FL (ref 7.4–10.4)
POTASSIUM SERPL-SCNC: 4.5 MMOL/L (ref 3.5–5.1)
PROT SERPL-MCNC: 7.4 GM/DL (ref 5.8–7.6)
RBC # BLD AUTO: 4.53 X10(6)/MCL (ref 4.7–6.1)
SODIUM SERPL-SCNC: 140 MMOL/L (ref 136–145)
WBC # SPEC AUTO: 6.4 X10(3)/MCL (ref 4.5–11.5)

## 2024-02-14 PROCEDURE — 85025 COMPLETE CBC W/AUTO DIFF WBC: CPT | Performed by: INTERNAL MEDICINE

## 2024-02-14 PROCEDURE — 86140 C-REACTIVE PROTEIN: CPT | Performed by: INTERNAL MEDICINE

## 2024-02-14 PROCEDURE — 85652 RBC SED RATE AUTOMATED: CPT | Performed by: INTERNAL MEDICINE

## 2024-02-14 PROCEDURE — 80053 COMPREHEN METABOLIC PANEL: CPT | Performed by: INTERNAL MEDICINE

## 2024-02-15 ENCOUNTER — OFFICE VISIT (OUTPATIENT)
Dept: ORTHOPEDICS | Facility: CLINIC | Age: 59
End: 2024-02-15
Payer: MEDICARE

## 2024-02-15 VITALS
HEIGHT: 69 IN | SYSTOLIC BLOOD PRESSURE: 100 MMHG | BODY MASS INDEX: 25.56 KG/M2 | DIASTOLIC BLOOD PRESSURE: 67 MMHG | HEART RATE: 75 BPM

## 2024-02-15 DIAGNOSIS — T81.41XA ABSCESS INVOLVING SUTURE: Primary | ICD-10-CM

## 2024-02-15 PROCEDURE — 99213 OFFICE O/P EST LOW 20 MIN: CPT | Mod: ,,, | Performed by: ORTHOPAEDIC SURGERY

## 2024-02-15 NOTE — PROGRESS NOTES
Subjective:       Patient ID: Bianca Khan is a 59 y.o. male.  Chief Complaint   Patient presents with    Right Knee - Wound Check     Rt knee patient states the suture was cutting the skin and has little drainage per wound care nurse. Wound was dressed. Patient does not have any pain. Went to hospital on January 15.       HPI:  Patient has a longstanding history of lower extremity paralysis and septic right knee.  We have removed all of the hardware from the right knee.  He now has a small area of infection likely at the skin.  It may tunnel deep.  No fevers no chills.  No new numbness or tingling.    ROS:  Constitutional: Denies fever chills  Eyes: No change in vision  ENT: No ringing or current infections  CV: No chest pain  Resp: No labored breathing  MSK: Pain evident at site of injury located in HPI,   Integ: No signs of abrasions or lacerations  Neuro: No numbness or tingling  Lymphatic: No swelling outside the area of injury     Current Outpatient Medications on File Prior to Visit   Medication Sig Dispense Refill    ALPRAZolam (XANAX) 0.25 MG tablet Take 1 tablet (0.25 mg total) by mouth 2 (two) times daily as needed for Anxiety (PRN Anxiety). 12 tablet 0    amLODIPine (NORVASC) 10 MG tablet Take 1 tablet (10 mg total) by mouth once daily. 30 tablet 0    atorvastatin (LIPITOR) 20 MG tablet Take 1 tablet (20 mg total) by mouth nightly. 30 tablet 0    carvediloL (COREG) 25 MG tablet Take 1 tablet (25 mg total) by mouth 2 (two) times daily with meals. 60 tablet 0    doxycycline (VIBRA-TABS) 100 MG tablet Take 1 tablet (100 mg total) by mouth every 12 (twelve) hours. 30 tablet 0    ferrous gluconate 324 mg (37.5 mg iron) Tab tablet Take 1 tablet (324 mg total) by mouth 2 (two) times daily with meals. 60 tablet 0    FLUoxetine 20 MG capsule 1 capsule (20 mg total) by Per G Tube route once daily. 30 capsule 0    folic acid (FOLVITE) 1 MG tablet Take 1 tablet (1 mg total) by mouth once daily. 30 tablet 0     "gabapentin (NEURONTIN) 300 MG capsule Take 1 capsule (300 mg total) by mouth 3 (three) times daily. 90 capsule 0    hydrALAZINE (APRESOLINE) 100 MG tablet 1 tablet (100 mg total) by Per G Tube route every 8 (eight) hours. 90 tablet 0    hydrOXYzine pamoate (VISTARIL) 50 MG Cap Take 1 capsule (50 mg total) by mouth every evening. 30 capsule 0    oxybutynin (DITROPAN) 5 MG Tab 1 tablet (5 mg total) by Per NG tube route 2 (two) times daily. 60 tablet 0    oxyCODONE-acetaminophen (PERCOCET)  mg per tablet Take 1 tablet by mouth every 6 (six) hours as needed for Pain. 12 tablet 0    temazepam (RESTORIL) 30 mg capsule Take 1 capsule (30 mg total) by mouth every evening. 30 capsule 0    traZODone (DESYREL) 100 MG tablet 1 tablet (100 mg total) by Per G Tube route every evening. 30 tablet 0     No current facility-administered medications on file prior to visit.          Objective:      /67 (BP Location: Left arm, Patient Position: Sitting, BP Method: Large (Automatic))   Pulse 75   Ht 5' 9" (1.753 m)   BMI 25.56 kg/m²   General the patient is alert and oriented x3 no acute distress nontoxic-appearing appropriate affect.    Constitutional: Vital signs are examined and stable.  Resp: No signs of labored breathing    RLE: -Skin:  5 mm suture abscess right anterior knee over previous incision No signs of new abrasions or lacerations, no scars           -MSK: :  Patient is paralyzed           -Neuro:  Patient is paralyzed           -Lymphatic: No signs of lymphadenopathy           -CV: Capillary refill is less than 2 seconds. DP/PT pulses  2/4. Compartments soft and compressible.   Body mass index is 25.56 kg/m².  Patient weight not recorded  Hemoglobin A1c   Date Value Ref Range Status   11/29/2023 6.0 <=7.0 % Final   03/12/2020 8.8 (H) 4.2 - 6.3 % Final     Hgb   Date Value Ref Range Status   02/14/2024 11.3 (L) 14.0 - 18.0 g/dL Final   01/15/2024 10.0 (L) 14.0 - 18.0 g/dL Final   07/13/2022 Trace-intact (A) " Negative Final     Vit D 25 OH   Date Value Ref Range Status   12/30/2021 27.2 (L) 30.0 - 80.0 ng/mL Final   06/10/2021 22.5 (L) 30.0 - 100.0 ng/mL Final     WBC   Date Value Ref Range Status   02/14/2024 6.40 4.50 - 11.50 x10(3)/mcL Final   01/15/2024 7.30 4.50 - 11.50 x10(3)/mcL Final   No x-rays taken at this time        Assessment:         1. Abscess involving suture                  Plan:         No follow-ups on file.    There are no diagnoses linked to this encounter.      Patient is here for wound check.  Patient appears to have a suture abscess of the right knee.  It may tunnel down to his bone.  I have discussed with him multiple occasions for extended periods of time that he needs an amputation to his right leg.  That is the next procedure that I will offer him as at this point above-the-knee amputation.  He understands the risks and benefits.  He is following up with Infectious Disease patient is self reports that his labs look good.  Continue wound care to the right knee.    This note/OR report was created with the assistance of  voice recognition software or phone  dictation.  There may be transcription errors as a result of using this technology however minimal. Effort has been made to assure accuracy of transcription but any obvious errors or omissions should be clarified with the author of the document.     Prabhu Shen DO  Orthopedic Trauma Surgery  02/15/2024      Future Appointments   Date Time Provider Department Center   2/15/2024  1:00 PM Ada Alexandra PA-C [facility] [place]   2/23/2024  9:00 AM Bebo Green AGACNP-[address] [place]

## 2024-05-09 ENCOUNTER — LAB REQUISITION (OUTPATIENT)
Dept: LAB | Facility: HOSPITAL | Age: 59
End: 2024-05-09
Payer: MEDICARE

## 2024-05-09 DIAGNOSIS — T24.031D BURN OF UNSPECIFIED DEGREE OF RIGHT LOWER LEG, SUBSEQUENT ENCOUNTER: ICD-10-CM

## 2024-05-09 DIAGNOSIS — S31.100D UNSPECIFIED OPEN WOUND OF ABDOMINAL WALL, RIGHT UPPER QUADRANT WITHOUT PENETRATION INTO PERITONEAL CAVITY, SUBSEQUENT ENCOUNTER: ICD-10-CM

## 2024-05-09 DIAGNOSIS — I10 ESSENTIAL (PRIMARY) HYPERTENSION: ICD-10-CM

## 2024-05-09 LAB
ALBUMIN SERPL-MCNC: 2.9 G/DL (ref 3.4–4.8)
ALBUMIN/GLOB SERPL: 0.7 RATIO (ref 1.1–2)
ALP SERPL-CCNC: 74 UNIT/L (ref 40–150)
ALT SERPL-CCNC: 16 UNIT/L (ref 0–55)
AST SERPL-CCNC: 34 UNIT/L (ref 5–34)
BASOPHILS # BLD AUTO: 0.02 X10(3)/MCL
BASOPHILS NFR BLD AUTO: 0.3 %
BILIRUB SERPL-MCNC: 0.5 MG/DL
BUN SERPL-MCNC: 16.2 MG/DL (ref 8.4–25.7)
CALCIUM SERPL-MCNC: 8.8 MG/DL (ref 8.8–10)
CHLORIDE SERPL-SCNC: 108 MMOL/L (ref 98–107)
CO2 SERPL-SCNC: 25 MMOL/L (ref 23–31)
CREAT SERPL-MCNC: 1.21 MG/DL (ref 0.73–1.18)
CRP SERPL-MCNC: 13 MG/L
EOSINOPHIL # BLD AUTO: 0.07 X10(3)/MCL (ref 0–0.9)
EOSINOPHIL NFR BLD AUTO: 1 %
ERYTHROCYTE [DISTWIDTH] IN BLOOD BY AUTOMATED COUNT: 20.2 % (ref 11.5–17)
ERYTHROCYTE [SEDIMENTATION RATE] IN BLOOD: 37 MM/HR (ref 0–15)
GFR SERPLBLD CREATININE-BSD FMLA CKD-EPI: >60 ML/MIN/1.73/M2
GLOBULIN SER-MCNC: 3.9 GM/DL (ref 2.4–3.5)
GLUCOSE SERPL-MCNC: 72 MG/DL (ref 82–115)
HCT VFR BLD AUTO: 30.5 % (ref 42–52)
HGB BLD-MCNC: 9.9 G/DL (ref 14–18)
IMM GRANULOCYTES # BLD AUTO: 0.01 X10(3)/MCL (ref 0–0.04)
IMM GRANULOCYTES NFR BLD AUTO: 0.1 %
LYMPHOCYTES # BLD AUTO: 2.65 X10(3)/MCL (ref 0.6–4.6)
LYMPHOCYTES NFR BLD AUTO: 38.6 %
MCH RBC QN AUTO: 25.6 PG (ref 27–31)
MCHC RBC AUTO-ENTMCNC: 32.5 G/DL (ref 33–36)
MCV RBC AUTO: 79 FL (ref 80–94)
MONOCYTES # BLD AUTO: 0.48 X10(3)/MCL (ref 0.1–1.3)
MONOCYTES NFR BLD AUTO: 7 %
NEUTROPHILS # BLD AUTO: 3.64 X10(3)/MCL (ref 2.1–9.2)
NEUTROPHILS NFR BLD AUTO: 53 %
NRBC BLD AUTO-RTO: 0 %
PLATELET # BLD AUTO: 400 X10(3)/MCL (ref 130–400)
PMV BLD AUTO: 9.8 FL (ref 7.4–10.4)
POTASSIUM SERPL-SCNC: 4.4 MMOL/L (ref 3.5–5.1)
PREALB SERPL-MCNC: 13.2 MG/DL (ref 16–42)
PROT SERPL-MCNC: 6.8 GM/DL (ref 5.8–7.6)
RBC # BLD AUTO: 3.86 X10(6)/MCL (ref 4.7–6.1)
SODIUM SERPL-SCNC: 138 MMOL/L (ref 136–145)
WBC # SPEC AUTO: 6.87 X10(3)/MCL (ref 4.5–11.5)

## 2024-05-09 PROCEDURE — 80053 COMPREHEN METABOLIC PANEL: CPT | Performed by: CLINICAL NURSE SPECIALIST

## 2024-05-09 PROCEDURE — 84134 ASSAY OF PREALBUMIN: CPT | Performed by: CLINICAL NURSE SPECIALIST

## 2024-05-09 PROCEDURE — 86140 C-REACTIVE PROTEIN: CPT | Performed by: CLINICAL NURSE SPECIALIST

## 2024-05-09 PROCEDURE — 85652 RBC SED RATE AUTOMATED: CPT | Performed by: CLINICAL NURSE SPECIALIST

## 2024-05-09 PROCEDURE — 83036 HEMOGLOBIN GLYCOSYLATED A1C: CPT | Performed by: CLINICAL NURSE SPECIALIST

## 2024-05-09 PROCEDURE — 85025 COMPLETE CBC W/AUTO DIFF WBC: CPT | Performed by: CLINICAL NURSE SPECIALIST

## 2024-05-10 LAB
EST. AVERAGE GLUCOSE BLD GHB EST-MCNC: 122.6 MG/DL
HBA1C MFR BLD: 5.9 %

## 2024-05-30 ENCOUNTER — HOSPITAL ENCOUNTER (INPATIENT)
Facility: HOSPITAL | Age: 59
LOS: 5 days | Discharge: NURSING, REHAB OR LONG-TERM CARE | DRG: 853 | End: 2024-06-04
Attending: STUDENT IN AN ORGANIZED HEALTH CARE EDUCATION/TRAINING PROGRAM | Admitting: INTERNAL MEDICINE
Payer: MEDICARE

## 2024-05-30 DIAGNOSIS — R53.1 WEAKNESS: ICD-10-CM

## 2024-05-30 DIAGNOSIS — T83.511A URINARY TRACT INFECTION ASSOCIATED WITH CATHETERIZATION OF URINARY TRACT, UNSPECIFIED INDWELLING URINARY CATHETER TYPE, INITIAL ENCOUNTER: Primary | ICD-10-CM

## 2024-05-30 DIAGNOSIS — R07.9 CHEST PAIN: ICD-10-CM

## 2024-05-30 DIAGNOSIS — N39.0 URINARY TRACT INFECTION ASSOCIATED WITH CATHETERIZATION OF URINARY TRACT, UNSPECIFIED INDWELLING URINARY CATHETER TYPE, INITIAL ENCOUNTER: Primary | ICD-10-CM

## 2024-05-30 DIAGNOSIS — S31.000A SACRAL WOUND: ICD-10-CM

## 2024-05-30 PROBLEM — A41.9 SEPSIS: Status: ACTIVE | Noted: 2024-05-30

## 2024-05-30 LAB
ALBUMIN SERPL-MCNC: 2.5 G/DL (ref 3.4–4.8)
ALBUMIN/GLOB SERPL: 0.7 RATIO (ref 1.1–2)
ALP SERPL-CCNC: 74 UNIT/L (ref 40–150)
ALT SERPL-CCNC: 13 UNIT/L (ref 0–55)
AMPHET UR QL SCN: POSITIVE
ANION GAP SERPL CALC-SCNC: 6 MEQ/L
AST SERPL-CCNC: 21 UNIT/L (ref 5–34)
BACTERIA #/AREA URNS AUTO: ABNORMAL /HPF
BARBITURATE SCN PRESENT UR: NEGATIVE
BASOPHILS # BLD AUTO: 0.02 X10(3)/MCL
BASOPHILS NFR BLD AUTO: 0.2 %
BENZODIAZ UR QL SCN: POSITIVE
BILIRUB SERPL-MCNC: 0.5 MG/DL
BILIRUB UR QL STRIP.AUTO: NEGATIVE
BUN SERPL-MCNC: 13.2 MG/DL (ref 8.4–25.7)
CALCIUM SERPL-MCNC: 8.1 MG/DL (ref 8.8–10)
CANNABINOIDS UR QL SCN: NEGATIVE
CHLORIDE SERPL-SCNC: 109 MMOL/L (ref 98–107)
CLARITY UR: ABNORMAL
CO2 SERPL-SCNC: 23 MMOL/L (ref 23–31)
COCAINE UR QL SCN: NEGATIVE
COLOR UR AUTO: YELLOW
CREAT SERPL-MCNC: 1.27 MG/DL (ref 0.73–1.18)
CREAT/UREA NIT SERPL: 10
EOSINOPHIL # BLD AUTO: 0.01 X10(3)/MCL (ref 0–0.9)
EOSINOPHIL NFR BLD AUTO: 0.1 %
ERYTHROCYTE [DISTWIDTH] IN BLOOD BY AUTOMATED COUNT: 20.9 % (ref 11.5–17)
FENTANYL UR QL SCN: NEGATIVE
FLUAV AG UPPER RESP QL IA.RAPID: NOT DETECTED
FLUBV AG UPPER RESP QL IA.RAPID: NOT DETECTED
GFR SERPLBLD CREATININE-BSD FMLA CKD-EPI: >60 ML/MIN/1.73/M2
GLOBULIN SER-MCNC: 3.6 GM/DL (ref 2.4–3.5)
GLUCOSE SERPL-MCNC: 119 MG/DL (ref 82–115)
GLUCOSE UR QL STRIP: NORMAL
GROUP & RH: NORMAL
HCT VFR BLD AUTO: 26.1 % (ref 42–52)
HGB BLD-MCNC: 8.1 G/DL (ref 14–18)
HGB UR QL STRIP: ABNORMAL
HYALINE CASTS #/AREA URNS LPF: ABNORMAL /LPF
IMM GRANULOCYTES # BLD AUTO: 0.04 X10(3)/MCL (ref 0–0.04)
IMM GRANULOCYTES NFR BLD AUTO: 0.4 %
INDIRECT COOMBS: NORMAL
KETONES UR QL STRIP: NEGATIVE
LACTATE SERPL-SCNC: 0.5 MMOL/L (ref 0.5–2.2)
LEUKOCYTE ESTERASE UR QL STRIP: 500
LIPASE SERPL-CCNC: 9 U/L
LYMPHOCYTES # BLD AUTO: 1.88 X10(3)/MCL (ref 0.6–4.6)
LYMPHOCYTES NFR BLD AUTO: 16.5 %
MCH RBC QN AUTO: 25.7 PG (ref 27–31)
MCHC RBC AUTO-ENTMCNC: 31 G/DL (ref 33–36)
MCV RBC AUTO: 82.9 FL (ref 80–94)
MDMA UR QL SCN: POSITIVE
MONOCYTES # BLD AUTO: 1.15 X10(3)/MCL (ref 0.1–1.3)
MONOCYTES NFR BLD AUTO: 10.1 %
MUCOUS THREADS URNS QL MICRO: ABNORMAL /LPF
NEUTROPHILS # BLD AUTO: 8.27 X10(3)/MCL (ref 2.1–9.2)
NEUTROPHILS NFR BLD AUTO: 72.7 %
NITRITE UR QL STRIP: ABNORMAL
NRBC BLD AUTO-RTO: 0 %
OHS QRS DURATION: 90 MS
OHS QTC CALCULATION: 438 MS
OPIATES UR QL SCN: POSITIVE
PCP UR QL: NEGATIVE
PH UR STRIP: 5.5 [PH]
PH UR: 5.5 [PH] (ref 3–11)
PLATELET # BLD AUTO: 318 X10(3)/MCL (ref 130–400)
PMV BLD AUTO: 9.2 FL (ref 7.4–10.4)
POTASSIUM SERPL-SCNC: 4 MMOL/L (ref 3.5–5.1)
PROT SERPL-MCNC: 6.1 GM/DL (ref 5.8–7.6)
PROT UR QL STRIP: ABNORMAL
RBC # BLD AUTO: 3.15 X10(6)/MCL (ref 4.7–6.1)
RBC #/AREA URNS AUTO: >100 /HPF
RSV A 5' UTR RNA NPH QL NAA+PROBE: NOT DETECTED
SARS-COV-2 RNA RESP QL NAA+PROBE: NOT DETECTED
SODIUM SERPL-SCNC: 138 MMOL/L (ref 136–145)
SP GR UR STRIP.AUTO: 1.03 (ref 1–1.03)
SPECIFIC GRAVITY, URINE AUTO (.000) (OHS): 1.03 (ref 1–1.03)
SPECIMEN OUTDATE: NORMAL
SQUAMOUS #/AREA URNS LPF: ABNORMAL /HPF
TROPONIN I SERPL-MCNC: <0.01 NG/ML (ref 0–0.04)
UROBILINOGEN UR STRIP-ACNC: NORMAL
WBC # SPEC AUTO: 11.37 X10(3)/MCL (ref 4.5–11.5)
WBC #/AREA URNS AUTO: >100 /HPF
WBC CLUMPS UR QL AUTO: ABNORMAL

## 2024-05-30 PROCEDURE — 25000003 PHARM REV CODE 250: Performed by: INTERNAL MEDICINE

## 2024-05-30 PROCEDURE — 96365 THER/PROPH/DIAG IV INF INIT: CPT

## 2024-05-30 PROCEDURE — 87040 BLOOD CULTURE FOR BACTERIA: CPT | Performed by: STUDENT IN AN ORGANIZED HEALTH CARE EDUCATION/TRAINING PROGRAM

## 2024-05-30 PROCEDURE — 86850 RBC ANTIBODY SCREEN: CPT | Performed by: STUDENT IN AN ORGANIZED HEALTH CARE EDUCATION/TRAINING PROGRAM

## 2024-05-30 PROCEDURE — 84484 ASSAY OF TROPONIN QUANT: CPT | Performed by: STUDENT IN AN ORGANIZED HEALTH CARE EDUCATION/TRAINING PROGRAM

## 2024-05-30 PROCEDURE — 80053 COMPREHEN METABOLIC PANEL: CPT | Performed by: STUDENT IN AN ORGANIZED HEALTH CARE EDUCATION/TRAINING PROGRAM

## 2024-05-30 PROCEDURE — 83605 ASSAY OF LACTIC ACID: CPT | Performed by: INTERNAL MEDICINE

## 2024-05-30 PROCEDURE — 63600175 PHARM REV CODE 636 W HCPCS: Performed by: STUDENT IN AN ORGANIZED HEALTH CARE EDUCATION/TRAINING PROGRAM

## 2024-05-30 PROCEDURE — 86900 BLOOD TYPING SEROLOGIC ABO: CPT | Performed by: STUDENT IN AN ORGANIZED HEALTH CARE EDUCATION/TRAINING PROGRAM

## 2024-05-30 PROCEDURE — 25000003 PHARM REV CODE 250: Performed by: STUDENT IN AN ORGANIZED HEALTH CARE EDUCATION/TRAINING PROGRAM

## 2024-05-30 PROCEDURE — 81015 MICROSCOPIC EXAM OF URINE: CPT | Performed by: STUDENT IN AN ORGANIZED HEALTH CARE EDUCATION/TRAINING PROGRAM

## 2024-05-30 PROCEDURE — 80307 DRUG TEST PRSMV CHEM ANLYZR: CPT | Performed by: STUDENT IN AN ORGANIZED HEALTH CARE EDUCATION/TRAINING PROGRAM

## 2024-05-30 PROCEDURE — 83690 ASSAY OF LIPASE: CPT | Performed by: STUDENT IN AN ORGANIZED HEALTH CARE EDUCATION/TRAINING PROGRAM

## 2024-05-30 PROCEDURE — 21400001 HC TELEMETRY ROOM

## 2024-05-30 PROCEDURE — 11000001 HC ACUTE MED/SURG PRIVATE ROOM

## 2024-05-30 PROCEDURE — 96361 HYDRATE IV INFUSION ADD-ON: CPT

## 2024-05-30 PROCEDURE — 85025 COMPLETE CBC W/AUTO DIFF WBC: CPT | Performed by: STUDENT IN AN ORGANIZED HEALTH CARE EDUCATION/TRAINING PROGRAM

## 2024-05-30 PROCEDURE — 99285 EMERGENCY DEPT VISIT HI MDM: CPT | Mod: 25

## 2024-05-30 PROCEDURE — 93010 ELECTROCARDIOGRAM REPORT: CPT | Mod: ,,, | Performed by: INTERNAL MEDICINE

## 2024-05-30 PROCEDURE — 87086 URINE CULTURE/COLONY COUNT: CPT | Performed by: STUDENT IN AN ORGANIZED HEALTH CARE EDUCATION/TRAINING PROGRAM

## 2024-05-30 PROCEDURE — 93005 ELECTROCARDIOGRAM TRACING: CPT

## 2024-05-30 PROCEDURE — 87077 CULTURE AEROBIC IDENTIFY: CPT | Performed by: STUDENT IN AN ORGANIZED HEALTH CARE EDUCATION/TRAINING PROGRAM

## 2024-05-30 PROCEDURE — 0241U COVID/RSV/FLU A&B PCR: CPT | Performed by: STUDENT IN AN ORGANIZED HEALTH CARE EDUCATION/TRAINING PROGRAM

## 2024-05-30 RX ORDER — POLYETHYLENE GLYCOL 3350 17 G/17G
17 POWDER, FOR SOLUTION ORAL 2 TIMES DAILY PRN
Status: DISCONTINUED | OUTPATIENT
Start: 2024-05-30 | End: 2024-06-04 | Stop reason: HOSPADM

## 2024-05-30 RX ORDER — RIVAROXABAN 20 MG/1
20 TABLET, FILM COATED ORAL DAILY
COMMUNITY
Start: 2024-05-08

## 2024-05-30 RX ORDER — FENOFIBRATE 43 MG/1
43 CAPSULE ORAL
COMMUNITY
End: 2024-05-30 | Stop reason: ALTCHOICE

## 2024-05-30 RX ORDER — GABAPENTIN 300 MG/1
300 CAPSULE ORAL 3 TIMES DAILY
Status: DISCONTINUED | OUTPATIENT
Start: 2024-05-31 | End: 2024-06-04 | Stop reason: HOSPADM

## 2024-05-30 RX ORDER — FLUOXETINE HYDROCHLORIDE 20 MG/1
20 CAPSULE ORAL DAILY
Status: DISCONTINUED | OUTPATIENT
Start: 2024-05-31 | End: 2024-06-04 | Stop reason: HOSPADM

## 2024-05-30 RX ORDER — SODIUM CHLORIDE 0.9 % (FLUSH) 0.9 %
10 SYRINGE (ML) INJECTION
Status: DISCONTINUED | OUTPATIENT
Start: 2024-05-30 | End: 2024-06-04 | Stop reason: HOSPADM

## 2024-05-30 RX ORDER — LANOLIN ALCOHOL/MO/W.PET/CERES
1 CREAM (GRAM) TOPICAL DAILY
Status: DISCONTINUED | OUTPATIENT
Start: 2024-05-31 | End: 2024-06-04 | Stop reason: HOSPADM

## 2024-05-30 RX ORDER — OXYCODONE AND ACETAMINOPHEN 10; 325 MG/1; MG/1
1 TABLET ORAL EVERY 6 HOURS PRN
Status: DISCONTINUED | OUTPATIENT
Start: 2024-05-30 | End: 2024-06-01

## 2024-05-30 RX ORDER — FAMOTIDINE 20 MG/1
20 TABLET, FILM COATED ORAL 2 TIMES DAILY
Status: DISCONTINUED | OUTPATIENT
Start: 2024-05-30 | End: 2024-06-04 | Stop reason: HOSPADM

## 2024-05-30 RX ORDER — METHOCARBAMOL 750 MG/1
750 TABLET, FILM COATED ORAL 2 TIMES DAILY
COMMUNITY
Start: 2024-05-15

## 2024-05-30 RX ORDER — OXYBUTYNIN CHLORIDE 5 MG/1
5 TABLET ORAL 2 TIMES DAILY
Status: DISCONTINUED | OUTPATIENT
Start: 2024-05-30 | End: 2024-06-04 | Stop reason: HOSPADM

## 2024-05-30 RX ORDER — TRAZODONE HYDROCHLORIDE 100 MG/1
100 TABLET ORAL NIGHTLY
Status: DISCONTINUED | OUTPATIENT
Start: 2024-05-30 | End: 2024-06-04 | Stop reason: HOSPADM

## 2024-05-30 RX ORDER — MUPIROCIN 20 MG/G
OINTMENT TOPICAL 2 TIMES DAILY
Status: DISPENSED | OUTPATIENT
Start: 2024-05-30 | End: 2024-06-04

## 2024-05-30 RX ORDER — CELECOXIB 200 MG/1
200 CAPSULE ORAL DAILY
COMMUNITY

## 2024-05-30 RX ORDER — ZOLPIDEM TARTRATE 5 MG/1
10 TABLET ORAL NIGHTLY PRN
Status: DISCONTINUED | OUTPATIENT
Start: 2024-05-30 | End: 2024-06-04 | Stop reason: HOSPADM

## 2024-05-30 RX ORDER — FENOFIBRATE 48 MG/1
48 TABLET, FILM COATED ORAL DAILY
Status: DISCONTINUED | OUTPATIENT
Start: 2024-05-31 | End: 2024-06-04 | Stop reason: HOSPADM

## 2024-05-30 RX ORDER — AMOXICILLIN 250 MG
2 CAPSULE ORAL 2 TIMES DAILY PRN
Status: DISCONTINUED | OUTPATIENT
Start: 2024-05-30 | End: 2024-06-04 | Stop reason: HOSPADM

## 2024-05-30 RX ORDER — CLONIDINE HYDROCHLORIDE 0.1 MG/1
0.1 TABLET ORAL 3 TIMES DAILY
COMMUNITY

## 2024-05-30 RX ORDER — ONDANSETRON HYDROCHLORIDE 2 MG/ML
4 INJECTION, SOLUTION INTRAVENOUS EVERY 4 HOURS PRN
Status: DISCONTINUED | OUTPATIENT
Start: 2024-05-30 | End: 2024-06-04 | Stop reason: HOSPADM

## 2024-05-30 RX ORDER — TEMAZEPAM 30 MG/1
30 CAPSULE ORAL NIGHTLY
COMMUNITY
Start: 2024-05-15

## 2024-05-30 RX ORDER — TALC
6 POWDER (GRAM) TOPICAL NIGHTLY PRN
Status: DISCONTINUED | OUTPATIENT
Start: 2024-05-30 | End: 2024-05-30

## 2024-05-30 RX ORDER — ATORVASTATIN CALCIUM 10 MG/1
20 TABLET, FILM COATED ORAL NIGHTLY
Status: DISCONTINUED | OUTPATIENT
Start: 2024-05-30 | End: 2024-06-04 | Stop reason: HOSPADM

## 2024-05-30 RX ORDER — ALUMINUM HYDROXIDE, MAGNESIUM HYDROXIDE, AND SIMETHICONE 1200; 120; 1200 MG/30ML; MG/30ML; MG/30ML
30 SUSPENSION ORAL 4 TIMES DAILY PRN
Status: DISCONTINUED | OUTPATIENT
Start: 2024-05-30 | End: 2024-06-04 | Stop reason: HOSPADM

## 2024-05-30 RX ORDER — LISINOPRIL 20 MG/1
20 TABLET ORAL DAILY
COMMUNITY

## 2024-05-30 RX ORDER — SODIUM CHLORIDE, SODIUM LACTATE, POTASSIUM CHLORIDE, CALCIUM CHLORIDE 600; 310; 30; 20 MG/100ML; MG/100ML; MG/100ML; MG/100ML
INJECTION, SOLUTION INTRAVENOUS CONTINUOUS
Status: ACTIVE | OUTPATIENT
Start: 2024-05-30 | End: 2024-06-01

## 2024-05-30 RX ORDER — PROCHLORPERAZINE EDISYLATE 5 MG/ML
5 INJECTION INTRAMUSCULAR; INTRAVENOUS EVERY 6 HOURS PRN
Status: DISCONTINUED | OUTPATIENT
Start: 2024-05-30 | End: 2024-06-04 | Stop reason: HOSPADM

## 2024-05-30 RX ORDER — METHOCARBAMOL 750 MG/1
750 TABLET, FILM COATED ORAL 2 TIMES DAILY PRN
Status: DISCONTINUED | OUTPATIENT
Start: 2024-05-30 | End: 2024-06-04 | Stop reason: HOSPADM

## 2024-05-30 RX ORDER — FENOFIBRATE 48 MG/1
48 TABLET, FILM COATED ORAL DAILY
COMMUNITY
Start: 2024-05-08

## 2024-05-30 RX ORDER — ACETAMINOPHEN 325 MG/1
650 TABLET ORAL EVERY 4 HOURS PRN
Status: DISCONTINUED | OUTPATIENT
Start: 2024-05-30 | End: 2024-06-04 | Stop reason: HOSPADM

## 2024-05-30 RX ORDER — FAMOTIDINE 20 MG/1
20 TABLET, FILM COATED ORAL 2 TIMES DAILY
COMMUNITY

## 2024-05-30 RX ADMIN — OXYBUTYNIN CHLORIDE 5 MG: 5 TABLET ORAL at 11:05

## 2024-05-30 RX ADMIN — MEROPENEM 1 G: 1 INJECTION, POWDER, FOR SOLUTION INTRAVENOUS at 07:05

## 2024-05-30 RX ADMIN — FAMOTIDINE 20 MG: 20 TABLET, FILM COATED ORAL at 11:05

## 2024-05-30 RX ADMIN — TRAZODONE HYDROCHLORIDE 100 MG: 100 TABLET ORAL at 11:05

## 2024-05-30 RX ADMIN — ATORVASTATIN CALCIUM 20 MG: 10 TABLET, FILM COATED ORAL at 11:05

## 2024-05-30 RX ADMIN — SODIUM CHLORIDE 2121 ML: 9 INJECTION, SOLUTION INTRAVENOUS at 03:05

## 2024-05-30 RX ADMIN — MUPIROCIN: 20 OINTMENT TOPICAL at 11:05

## 2024-05-30 RX ADMIN — OXYCODONE HYDROCHLORIDE AND ACETAMINOPHEN 1 TABLET: 10; 325 TABLET ORAL at 11:05

## 2024-05-30 NOTE — ED PROVIDER NOTES
Encounter Date: 5/30/2024    SCRIBE #1 NOTE: I, Pastora Gale, am scribing for, and in the presence of,  Ambrosio Figueroa MD. I have scribed the following portions of the note - Other sections scribed: HPI, ROS, PE.       History     Chief Complaint   Patient presents with    Weakness     Presents via [facility] for generalized weakness that began 2 weeks after he started fasting. Pt reports decreased appetite and chronic neck pain. Took 162mg ASA today.     Patient is a 59 year old male with a history of osteomyelitis and paraplegia that presents to the ED via EMS for generalized weakness onset 3 weeks. Patient recently started fasting. Patient's wife reports a fever with a high of 103F last night and hypotension at home. She also reports intermittent confusion.     PCP: Areli Vargas MD    The history is provided by the patient, medical records and the spouse. No  was used.     Review of patient's allergies indicates:   Allergen Reactions    Baclofen Itching and Anxiety     Past Medical History:   Diagnosis Date    Arthritis     Chronic ulcer of ankle 05/26/2022    Frequent UTI 07/02/2019    Generalized anxiety disorder 05/26/2022    Neurogenic bladder 05/26/2022    Osteomyelitis 05/26/2022    Presence of suprapubic catheter 05/26/2022    Pure hypercholesterolemia 05/26/2022    Retention of urine, unspecified 08/09/2019    Spinal cord injury at T1-T6 level 04/20/2018     Past Surgical History:   Procedure Laterality Date    FRACTURE SURGERY  2021    INCISION AND DRAINAGE, LOWER EXTREMITY Right 06/29/2023    Procedure: INCISION AND DRAINAGE, LOWER EXTREMITY;  Surgeon: Prabhu Shen DO;  Location: [facility] OR;  Service: Orthopedics;  Laterality: Right;  supine bone foam wash stuff cultures    INCISION AND DRAINAGE, LOWER EXTREMITY Right 11/30/2023    Procedure: INCISION AND DRAINAGE, LOWER EXTREMITY;  Surgeon: Prabhu Shen DO;  Location: [facility] OR;  Service: Orthopedics;  Laterality: Right;  supine any  table bone foam wash stuff possible wound vac    INCISION AND DRAINAGE, LOWER EXTREMITY Right 2023    Procedure: INCISION AND DRAINAGE, LOWER EXTREMITY;  Surgeon: Prabhu Shen DO;  Location: [facility] OR;  Service: Orthopedics;  Laterality: Right;  supine bone foam vascular bed removal of distal femoral plate, wash stuff, possible AKA    INSERTION OF INTRAMEDULLARY MARISA Right 08/10/2022    Procedure: INSERTION, INTRAMEDULLARY MARISA RIGHT TIBIA;  Surgeon: Jorge Orellana MD;  Location: [facility] OR;  Service: Orthopedics;  Laterality: Right;    JOINT REPLACEMENT      Ankel    REMOVAL OF HARDWARE FROM LOWER EXTREMITY Right 2023    Procedure: REMOVAL, HARDWARE, LOWER EXTREMITY;  Surgeon: Prabhu Shen DO;  Location: [facility] OR;  Service: Orthopedics;  Laterality: Right;    SPINE SURGERY  2018     Family History   Problem Relation Name Age of Onset    No Known Problems Mother      No Known Problems Father       Social History     Tobacco Use    Smoking status: Some Days     Current packs/day: 0.00     Average packs/day: 0.2 packs/day for 41.5 years (10.4 ttl pk-yrs)     Types: Cigars, Cigarettes     Start date: 1982     Last attempt to quit: 2023     Years since quittin.8    Smokeless tobacco: Never   Substance Use Topics    Alcohol use: Not Currently     Alcohol/week: 2.0 standard drinks of alcohol     Types: 2 Cans of beer per week    Drug use: Not Currently     Types: Oxycodone     Review of Systems   Constitutional:  Positive for fever.        Generalized weakness    Cardiovascular:         Hypotension    Psychiatric/Behavioral:  Positive for confusion.        Physical Exam     Initial Vitals [24 1341]   BP Pulse Resp Temp SpO2   (!) 103/42 62 17 97.2 °F (36.2 °C) 98 %      MAP       --         Physical Exam    Constitutional: He appears well-developed and well-nourished. He is not diaphoretic. No distress.   HENT:   Head: Normocephalic and atraumatic.   Right Ear: External ear  normal.   Left Ear: External ear normal.   Nose: Nose normal.   Eyes: EOM are normal. Pupils are equal, round, and reactive to light. Right eye exhibits no discharge. Left eye exhibits no discharge.   Cardiovascular:  Normal rate, regular rhythm and normal heart sounds.     Exam reveals no gallop and no friction rub.       No murmur heard.  Pulmonary/Chest: Effort normal and breath sounds normal. No respiratory distress. He has no wheezes. He has no rhonchi. He has no rales. He exhibits no tenderness.   Abdominal: Abdomen is soft. Bowel sounds are normal. He exhibits no distension and no mass. There is no abdominal tenderness.   Suprapubic catheter; clean and dry. There is no rebound and no guarding.   Musculoskeletal:         General: No edema. Normal range of motion.      Comments: Wound vac to sacral ulcer with purulent drainage in line. Wound to superior anterior left knee. Surgical scars and chronic swelling to right knee.      Neurological: He is alert and oriented to person, place, and time. No cranial nerve deficit or sensory deficit.   Skin: Skin is warm and dry. Capillary refill takes less than 2 seconds.         ED Course   Procedures  Labs Reviewed   COMPREHENSIVE METABOLIC PANEL - Abnormal; Notable for the following components:       Result Value    Chloride 109 (*)     Glucose 119 (*)     Creatinine 1.27 (*)     Calcium 8.1 (*)     Albumin 2.5 (*)     Globulin 3.6 (*)     Albumin/Globulin Ratio 0.7 (*)     All other components within normal limits   URINALYSIS, REFLEX TO URINE CULTURE - Abnormal; Notable for the following components:    Appearance, UA Turbid (*)     Protein, UA 1+ (*)     Blood, UA 2+ (*)     Nitrites, UA 2+ (*)     Leukocyte Esterase,  (*)     WBC, UA >100 (*)     WBC Clumps, UA Many (*)     Bacteria, UA Few (*)     Mucous, UA Trace (*)     Hyaline Casts, UA 3-5 (*)     RBC, UA >100 (*)     All other components within normal limits   CBC WITH DIFFERENTIAL - Abnormal; Notable for  the following components:    RBC 3.15 (*)     Hgb 8.1 (*)     Hct 26.1 (*)     MCH 25.7 (*)     MCHC 31.0 (*)     RDW 20.9 (*)     All other components within normal limits   DRUG SCREEN, URINE (BEAKER) - Abnormal; Notable for the following components:    Amphetamines, Urine Positive (*)     Benzodiazepine, Urine Positive (*)     MDMA, Urine Positive (*)     Opiates, Urine Positive (*)     All other components within normal limits    Narrative:     Cut off concentrations:    Amphetamines - 1000 ng/ml  Barbiturates - 200 ng/ml  Benzodiazepine - 200 ng/ml  Cannabinoids (THC) - 50 ng/ml  Cocaine - 300 ng/ml  Fentanyl - 1.0 ng/ml  MDMA - 500 ng/ml  Opiates - 300 ng/ml   Phencyclidine (PCP) - 25 ng/ml    Specimen submitted for drug analysis and tested for pH and specific gravity in order to evaluate sample integrity. Suspect tampering if specific gravity is <1.003 and/or pH is not within the range of 4.5 - 8.0  False negatives may result form substances such as bleach added to urine.  False positives may result for the presence of a substance with similar chemical structure to the drug or its metabolite.    This test provides only a PRELIMINARY analytical test result. A more specific alternate chemical method must be used in order to obtain a confirmed analytical result. Gas chromatography/mass spectrometry (GC/MS) is the preferred confirmatory method. Other chemical confirmation methods are available. Clinical consideration and professional judgement should be applied to any drug of abuse test result, particularly when preliminary positive results are used.    Positive results will be confirmed only at the physicians request. Unconfirmed screening results are to be used only for medical purposes (treatment).        COVID/RSV/FLU A&B PCR - Normal    Narrative:     The Xpert Xpress SARS-CoV-2/FLU/RSV plus is a rapid, multiplexed real-time PCR test intended for the simultaneous qualitative detection and differentiation of  SARS-CoV-2, Influenza A, Influenza B, and respiratory syncytial virus (RSV) viral RNA in either nasopharyngeal swab or nasal swab specimens.         LIPASE - Normal   TROPONIN I - Normal   BLOOD CULTURE [facility]   BLOOD CULTURE [facility]   CULTURE, URINE   WOUND CULTURE ([facility])   CBC W/ AUTO DIFFERENTIAL    Narrative:     The following orders were created for panel order CBC auto differential.  Procedure                               Abnormality         Status                     ---------                               -----------         ------                     CBC with Differential[[id number]]       Abnormal            Final result                 Please view results for these tests on the individual orders.   MRSA PCR   LACTIC ACID, PLASMA   TYPE & SCREEN        ECG Results              EKG 12-lead (Final result)        Collection Time Result Time QRS Duration [facility] QTC Calculation    05/30/24 13:31:02 05/30/24 19:02:15 90 438                     Final result by Interface, Lab In [facility] (05/30/24 19:02:22)                   Narrative:    Test Reason : R53.1,    Vent. Rate : 062 BPM     Atrial Rate : 062 BPM     P-R Int : 142 ms          QRS Dur : 090 ms      QT Int : 432 ms       P-R-T Axes : 077 067 054 degrees     QTc Int : 438 ms    Normal sinus rhythm  Normal ECG  When compared with ECG of 30-NOV-2023 01:42,  No significant change was found  Confirmed by Knorad Newman MD ([id number]) on 5/30/2024 7:02:14 PM    Referred By: DARSHAN SALAZAR           Confirmed By:Konrad Newman MD                                  Imaging Results              X-Ray Chest AP Portable (Final result)  Result time 05/30/24 15:38:13      Final result by Mikal White MD (05/30/24 15:38:13)                   Impression:      No acute findings.      Electronically signed by: Mikal White  Date:    05/30/2024  Time:    15:38               Narrative:    EXAMINATION:  XR CHEST AP PORTABLE    CLINICAL HISTORY:  Sepsis;    COMPARISON:  Earlier  today    FINDINGS:  Frontal view of the chest was obtained. Heart and mediastinum unchanged.  Suspect some mild atelectasis.  Otherwise grossly clear lungs.  No pneumothorax.                                       X-Ray Chest AP Portable (Final result)  Result time 05/30/24 14:14:53      Final result by Rob Arauz MD (05/30/24 14:14:53)                   Impression:      No acute chest disease is identified.      Electronically signed by: Rob Arauz  Date:    05/30/2024  Time:    14:14               Narrative:    EXAMINATION:  XR CHEST AP PORTABLE    CLINICAL HISTORY:  weakness;, .    FINDINGS:  No alveolar consolidation, effusion, or pneumothorax is seen.   The thoracic aorta is normal  cardiac silhouette, central pulmonary vessels and mediastinum are normal in size and are grossly unremarkable.   visualized osseous structures are grossly unremarkable.    Some fibrotic streaks identified at the left base                                       Medications   vancomycin - pharmacy to dose (has no administration in time range)   meropenem (MERREM) 1 g in sodium chloride 0.9 % 100 mL IVPB (MB+) (has no administration in time range)   sodium chloride 0.9% flush 10 mL (has no administration in time range)   melatonin tablet 6 mg (has no administration in time range)   ondansetron injection 4 mg (has no administration in time range)   prochlorperazine injection Soln 5 mg (has no administration in time range)   polyethylene glycol packet 17 g (has no administration in time range)   senna-docusate 8.6-50 mg per tablet 2 tablet (has no administration in time range)   acetaminophen tablet 650 mg (has no administration in time range)   aluminum-magnesium hydroxide-simethicone 200-200-20 mg/5 mL suspension 30 mL (has no administration in time range)   vancomycin 1.5 g in dextrose 5 % 250 mL IVPB (ready to mix) (has no administration in time range)     Followed by   vancomycin 1.5 g in dextrose 5 % 250 mL IVPB  (ready to mix) (has no administration in time range)   sodium chloride 0.9% bolus 2,121 mL 2,121 mL (0 mLs Intravenous Stopped 5/30/24 1631)   meropenem (MERREM) 1 g in sodium chloride 0.9 % 100 mL IVPB (MB+) (0 g Intravenous Stopped 5/30/24 2007)     Medical Decision Making  Differential diagnosis includes, but is not limited to, generalized weakness, electrolyte abnormality, worsening wound infection, urinary tract infection, dehydration, general malaise, DKA  Please see ED course for MDM, in short patient with UTI a low blood pressure with a he was fluid responsive here in the emergency department.  We will treat with meropenem that his last culture shows he was sensitive to it for further evaluation management.  Spoke with Hermila on-call for hospitalist.  Will admit.    Amount and/or Complexity of Data Reviewed  Independent Historian: spouse     Details: Patient's wife reports a fever with a high of 103F last night and hypotension at home. She also reports intermittent confusion.     External Data Reviewed: notes.  Labs: ordered. Decision-making details documented in ED Course.  Radiology: ordered and independent interpretation performed.     Details: Chest x-ray negative for acute  ECG/medicine tests: ordered and independent interpretation performed. Decision-making details documented in ED Course.    Risk  OTC drugs.  Prescription drug management.  Decision regarding hospitalization.            Scribe Attestation:   Scribe #1: I performed the above scribed service and the documentation accurately describes the services I performed. I attest to the accuracy of the note.    Attending Attestation:           Physician Attestation for Scribe:  Physician Attestation Statement for Scribe #1: I, Ambrosio Figueroa MD, reviewed documentation, as scribed by Pastora Gale in my presence, and it is both accurate and complete.             ED Course as of 05/30/24 2134   Thu May 30, 2024   1430 EKG done at 1:31 p.m. shows sinus  rhythm rate of 62 .  Normal axis.  No ST elevation or depression. [MM]   1743 Troponin I: <0.010 [MM]   1743 CBC auto differential(!)  Worsening anemia with a past several months.  No leukocytosis. [MM]   1743 Comprehensive metabolic panel(!)  No significant electrolyte abnormality.  Stable elevation in creatinine [MM]   1743 Urinalysis, Reflex to Urine Culture(!)  Concern for UTI possibly colonized. [MM]   1753 Patient has UTI, decreasing hemoglobin.  Had some low blood pressure when he 1st presented here and wife reports same with fever at home.  Concern for urinary tract infection.  Will start on antibiotics.  Will admit for further evaluation. [MM]   1949 This is a discharge summary from [facility] from 01/16/2024.  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 [MM]      ED Course User Index  [MM] Ambrosio Figueroa MD                           Clinical Impression:  Final diagnoses:  [R53.1] Weakness  [T83.511A, N39.0] Urinary tract infection associated with catheterization of urinary tract, unspecified indwelling urinary catheter type, initial encounter (Primary)          ED Disposition Condition    Admit Stable                Ambrosio Figueroa MD  05/30/24 [id number]

## 2024-05-30 NOTE — Clinical Note
Diagnosis: Weakness [086595]   Future Attending Provider: CHRISTOPHER SOLORZANO [[id number]]   Admit to which facility:: [facility] [[id number]]   Reason for IP Medical Treatment  (Clinical interventions that can only be accomplished in the IP setting? ) :: IV abx   I certify that Inpatient services for greater than or equal to 2 midnights are medically necessary:: Yes   Plans for Post-Acute care--if anticipated (pick the single best option):: A. No post acute care anticipated at this time

## 2024-05-31 LAB
25(OH)D3+25(OH)D2 SERPL-MCNC: 24 NG/ML (ref 30–80)
ABO + RH BLD: NORMAL
ALBUMIN SERPL-MCNC: 2.1 G/DL (ref 3.4–4.8)
ALBUMIN/GLOB SERPL: 0.7 RATIO (ref 1.1–2)
ALP SERPL-CCNC: 65 UNIT/L (ref 40–150)
ALT SERPL-CCNC: 13 UNIT/L (ref 0–55)
ANION GAP SERPL CALC-SCNC: 7 MEQ/L
AST SERPL-CCNC: 18 UNIT/L (ref 5–34)
BASOPHILS # BLD AUTO: 0.02 X10(3)/MCL
BASOPHILS NFR BLD AUTO: 0.2 %
BILIRUB SERPL-MCNC: 0.4 MG/DL
BLD PROD TYP BPU: NORMAL
BLOOD UNIT EXPIRATION DATE: NORMAL
BLOOD UNIT TYPE CODE: 5100
BUN SERPL-MCNC: 7.6 MG/DL (ref 8.4–25.7)
CALCIUM SERPL-MCNC: 8 MG/DL (ref 8.8–10)
CHLORIDE SERPL-SCNC: 109 MMOL/L (ref 98–107)
CO2 SERPL-SCNC: 23 MMOL/L (ref 23–31)
CREAT SERPL-MCNC: 0.77 MG/DL (ref 0.73–1.18)
CREAT/UREA NIT SERPL: 10
CROSSMATCH INTERPRETATION: NORMAL
CRP SERPL-MCNC: 175.5 MG/L
DISPENSE STATUS: NORMAL
EOSINOPHIL # BLD AUTO: 0.07 X10(3)/MCL (ref 0–0.9)
EOSINOPHIL NFR BLD AUTO: 0.6 %
ERYTHROCYTE [DISTWIDTH] IN BLOOD BY AUTOMATED COUNT: 21.2 % (ref 11.5–17)
ERYTHROCYTE [SEDIMENTATION RATE] IN BLOOD: 66 MM/HR (ref 0–15)
FERRITIN SERPL-MCNC: 255.79 NG/ML (ref 21.81–274.66)
FOLATE SERPL-MCNC: 6.4 NG/ML (ref 7–31.4)
GFR SERPLBLD CREATININE-BSD FMLA CKD-EPI: >60 ML/MIN/1.73/M2
GLOBULIN SER-MCNC: 3.2 GM/DL (ref 2.4–3.5)
GLUCOSE SERPL-MCNC: 104 MG/DL (ref 82–115)
HCT VFR BLD AUTO: 25 % (ref 42–52)
HGB BLD-MCNC: 7.9 G/DL (ref 14–18)
IMM GRANULOCYTES # BLD AUTO: 0.05 X10(3)/MCL (ref 0–0.04)
IMM GRANULOCYTES NFR BLD AUTO: 0.4 %
IRON SATN MFR SERPL: 7 % (ref 20–50)
IRON SERPL-MCNC: 9 UG/DL (ref 65–175)
LYMPHOCYTES # BLD AUTO: 2.31 X10(3)/MCL (ref 0.6–4.6)
LYMPHOCYTES NFR BLD AUTO: 19.1 %
MAGNESIUM SERPL-MCNC: 1.6 MG/DL (ref 1.6–2.6)
MCH RBC QN AUTO: 26.7 PG (ref 27–31)
MCHC RBC AUTO-ENTMCNC: 31.6 G/DL (ref 33–36)
MCV RBC AUTO: 84.5 FL (ref 80–94)
MONOCYTES # BLD AUTO: 1.06 X10(3)/MCL (ref 0.1–1.3)
MONOCYTES NFR BLD AUTO: 8.8 %
MRSA PCR SCRN (OHS): NOT DETECTED
NEUTROPHILS # BLD AUTO: 8.57 X10(3)/MCL (ref 2.1–9.2)
NEUTROPHILS NFR BLD AUTO: 70.9 %
NRBC BLD AUTO-RTO: 0 %
PHOSPHATE SERPL-MCNC: 3.1 MG/DL (ref 2.3–4.7)
PLATELET # BLD AUTO: 355 X10(3)/MCL (ref 130–400)
PMV BLD AUTO: 9.5 FL (ref 7.4–10.4)
POCT GLUCOSE: 111 MG/DL (ref 70–110)
POCT GLUCOSE: 89 MG/DL (ref 70–110)
POTASSIUM SERPL-SCNC: 3.9 MMOL/L (ref 3.5–5.1)
PROT SERPL-MCNC: 5.3 GM/DL (ref 5.8–7.6)
RBC # BLD AUTO: 2.96 X10(6)/MCL (ref 4.7–6.1)
SODIUM SERPL-SCNC: 139 MMOL/L (ref 136–145)
TIBC SERPL-MCNC: 114 UG/DL (ref 69–240)
TIBC SERPL-MCNC: 123 UG/DL (ref 250–450)
TRANSFERRIN SERPL-MCNC: 112 MG/DL (ref 174–364)
UNIT NUMBER: NORMAL
VIT B12 SERPL-MCNC: 826 PG/ML (ref 213–816)
WBC # SPEC AUTO: 12.08 X10(3)/MCL (ref 4.5–11.5)

## 2024-05-31 PROCEDURE — 82728 ASSAY OF FERRITIN: CPT | Performed by: INTERNAL MEDICINE

## 2024-05-31 PROCEDURE — 85025 COMPLETE CBC W/AUTO DIFF WBC: CPT | Performed by: INTERNAL MEDICINE

## 2024-05-31 PROCEDURE — 25000003 PHARM REV CODE 250: Performed by: NURSE PRACTITIONER

## 2024-05-31 PROCEDURE — 82607 VITAMIN B-12: CPT | Performed by: INTERNAL MEDICINE

## 2024-05-31 PROCEDURE — P9016 RBC LEUKOCYTES REDUCED: HCPCS | Performed by: INTERNAL MEDICINE

## 2024-05-31 PROCEDURE — 87070 CULTURE OTHR SPECIMN AEROBIC: CPT | Performed by: INTERNAL MEDICINE

## 2024-05-31 PROCEDURE — 84100 ASSAY OF PHOSPHORUS: CPT | Performed by: INTERNAL MEDICINE

## 2024-05-31 PROCEDURE — 86140 C-REACTIVE PROTEIN: CPT | Performed by: INTERNAL MEDICINE

## 2024-05-31 PROCEDURE — 97606 NEG PRS WND THER DME>50 SQCM: CPT

## 2024-05-31 PROCEDURE — 30233N1 TRANSFUSION OF NONAUTOLOGOUS RED BLOOD CELLS INTO PERIPHERAL VEIN, PERCUTANEOUS APPROACH: ICD-10-PCS | Performed by: INTERNAL MEDICINE

## 2024-05-31 PROCEDURE — 36415 COLL VENOUS BLD VENIPUNCTURE: CPT | Performed by: INTERNAL MEDICINE

## 2024-05-31 PROCEDURE — 87641 MR-STAPH DNA AMP PROBE: CPT | Performed by: INTERNAL MEDICINE

## 2024-05-31 PROCEDURE — 36430 TRANSFUSION BLD/BLD COMPNT: CPT

## 2024-05-31 PROCEDURE — 25000003 PHARM REV CODE 250: Performed by: INTERNAL MEDICINE

## 2024-05-31 PROCEDURE — 63600175 PHARM REV CODE 636 W HCPCS: Performed by: INTERNAL MEDICINE

## 2024-05-31 PROCEDURE — 83540 ASSAY OF IRON: CPT | Performed by: INTERNAL MEDICINE

## 2024-05-31 PROCEDURE — 82746 ASSAY OF FOLIC ACID SERUM: CPT | Performed by: INTERNAL MEDICINE

## 2024-05-31 PROCEDURE — 0T2BX0Z CHANGE DRAINAGE DEVICE IN BLADDER, EXTERNAL APPROACH: ICD-10-PCS | Performed by: UROLOGY

## 2024-05-31 PROCEDURE — 21400001 HC TELEMETRY ROOM

## 2024-05-31 PROCEDURE — 86923 COMPATIBILITY TEST ELECTRIC: CPT | Performed by: INTERNAL MEDICINE

## 2024-05-31 PROCEDURE — 83735 ASSAY OF MAGNESIUM: CPT | Performed by: INTERNAL MEDICINE

## 2024-05-31 PROCEDURE — 85652 RBC SED RATE AUTOMATED: CPT | Performed by: INTERNAL MEDICINE

## 2024-05-31 PROCEDURE — 80053 COMPREHEN METABOLIC PANEL: CPT | Performed by: INTERNAL MEDICINE

## 2024-05-31 PROCEDURE — 82306 VITAMIN D 25 HYDROXY: CPT | Performed by: INTERNAL MEDICINE

## 2024-05-31 RX ORDER — DOXYCYCLINE HYCLATE 100 MG
100 TABLET ORAL EVERY 12 HOURS
Status: DISCONTINUED | OUTPATIENT
Start: 2024-05-31 | End: 2024-06-04 | Stop reason: HOSPADM

## 2024-05-31 RX ORDER — HYDROCODONE BITARTRATE AND ACETAMINOPHEN 500; 5 MG/1; MG/1
TABLET ORAL
Status: DISCONTINUED | OUTPATIENT
Start: 2024-05-31 | End: 2024-06-04 | Stop reason: HOSPADM

## 2024-05-31 RX ORDER — ERGOCALCIFEROL 1.25 MG/1
50000 CAPSULE ORAL
Status: DISCONTINUED | OUTPATIENT
Start: 2024-06-01 | End: 2024-06-04 | Stop reason: HOSPADM

## 2024-05-31 RX ORDER — FOLIC ACID 1 MG/1
1 TABLET ORAL DAILY
Status: DISCONTINUED | OUTPATIENT
Start: 2024-05-31 | End: 2024-06-04 | Stop reason: HOSPADM

## 2024-05-31 RX ADMIN — MUPIROCIN: 20 OINTMENT TOPICAL at 08:05

## 2024-05-31 RX ADMIN — SODIUM CHLORIDE, POTASSIUM CHLORIDE, SODIUM LACTATE AND CALCIUM CHLORIDE: 600; 310; 30; 20 INJECTION, SOLUTION INTRAVENOUS at 09:05

## 2024-05-31 RX ADMIN — OXYBUTYNIN CHLORIDE 5 MG: 5 TABLET ORAL at 09:05

## 2024-05-31 RX ADMIN — MEROPENEM 1 G: 1 INJECTION INTRAVENOUS at 09:05

## 2024-05-31 RX ADMIN — RIVAROXABAN 20 MG: 10 TABLET, FILM COATED ORAL at 05:05

## 2024-05-31 RX ADMIN — SODIUM CHLORIDE, POTASSIUM CHLORIDE, SODIUM LACTATE AND CALCIUM CHLORIDE: 600; 310; 30; 20 INJECTION, SOLUTION INTRAVENOUS at 12:05

## 2024-05-31 RX ADMIN — DOXYCYCLINE HYCLATE 100 MG: 100 TABLET, COATED ORAL at 09:05

## 2024-05-31 RX ADMIN — ATORVASTATIN CALCIUM 20 MG: 10 TABLET, FILM COATED ORAL at 09:05

## 2024-05-31 RX ADMIN — OXYBUTYNIN CHLORIDE 5 MG: 5 TABLET ORAL at 08:05

## 2024-05-31 RX ADMIN — GABAPENTIN 300 MG: 300 CAPSULE ORAL at 08:05

## 2024-05-31 RX ADMIN — FAMOTIDINE 20 MG: 20 TABLET, FILM COATED ORAL at 09:05

## 2024-05-31 RX ADMIN — GABAPENTIN 300 MG: 300 CAPSULE ORAL at 03:05

## 2024-05-31 RX ADMIN — FLUOXETINE HYDROCHLORIDE 20 MG: 20 CAPSULE ORAL at 08:05

## 2024-05-31 RX ADMIN — MUPIROCIN: 20 OINTMENT TOPICAL at 09:05

## 2024-05-31 RX ADMIN — METHOCARBAMOL 750 MG: 750 TABLET ORAL at 08:05

## 2024-05-31 RX ADMIN — TRAZODONE HYDROCHLORIDE 100 MG: 100 TABLET ORAL at 09:05

## 2024-05-31 RX ADMIN — GABAPENTIN 300 MG: 300 CAPSULE ORAL at 09:05

## 2024-05-31 RX ADMIN — FOLIC ACID 1 MG: 1 TABLET ORAL at 09:05

## 2024-05-31 RX ADMIN — OXYCODONE HYDROCHLORIDE AND ACETAMINOPHEN 1 TABLET: 10; 325 TABLET ORAL at 03:05

## 2024-05-31 RX ADMIN — FAMOTIDINE 20 MG: 20 TABLET, FILM COATED ORAL at 08:05

## 2024-05-31 RX ADMIN — FENOFIBRATE 48 MG: 48 TABLET, FILM COATED ORAL at 08:05

## 2024-05-31 RX ADMIN — METHOCARBAMOL 750 MG: 750 TABLET ORAL at 09:05

## 2024-05-31 RX ADMIN — FERROUS SULFATE TAB 325 MG (65 MG ELEMENTAL FE) 1 EACH: 325 (65 FE) TAB at 08:05

## 2024-05-31 NOTE — PROGRESS NOTES
[facility] [address] Surg  Wound Care    Patient Name:  Bianca Khan   MRN:  [id number]  Date: 2024  Diagnosis: Sacral wound    History:     Past Medical History:   Diagnosis Date    Arthritis     Chronic ulcer of ankle 2022    Frequent UTI 2019    Generalized anxiety disorder 2022    Neurogenic bladder 2022    Osteomyelitis 2022    Presence of suprapubic catheter 2022    Pure hypercholesterolemia 2022    Retention of urine, unspecified 2019    Spinal cord injury at T1-T6 level 2018       Social History     Socioeconomic History    Marital status:    Tobacco Use    Smoking status: Some Days     Current packs/day: 0.00     Average packs/day: 0.2 packs/day for 41.5 years (10.4 ttl pk-yrs)     Types: Cigars, Cigarettes     Start date: 1982     Last attempt to quit: 2023     Years since quittin.8    Smokeless tobacco: Never   Substance and Sexual Activity    Alcohol use: Not Currently     Alcohol/week: 2.0 standard drinks of alcohol     Types: 2 Cans of beer per week    Drug use: Not Currently     Types: Oxycodone    Sexual activity: Not Currently     Partners: Female     Birth control/protection: None     Social Determinants of Health     Financial Resource Strain: Low Risk  (2023)    Overall Financial Resource Strain (CARDIA)     Difficulty of Paying Living Expenses: Not very hard   Food Insecurity: No Food Insecurity (2023)    Hunger Vital Sign     Worried About Running Out of Food in the Last Year: Never true     Ran Out of Food in the Last Year: Never true   Transportation Needs: No Transportation Needs (2023)    PRAPARE - Transportation     Lack of Transportation (Medical): No     Lack of Transportation (Non-Medical): No   Physical Activity: Inactive (2023)    Exercise Vital Sign     Days of Exercise per Week: 0 days     Minutes of Exercise per Session: 0 min   Stress: No Stress Concern  Present (12/11/2023)    [facility] [employer]ational [employer] - Occupational Stress Questionnaire     Feeling of Stress : Only a little   Housing Stability: Low Risk  (12/11/2023)    Housing Stability Vital Sign     Unable to Pay for Housing in the Last Year: No     Number of Places Lived in the Last Year: 1     Unstable Housing in the Last Year: No       Precautions:     Allergies as of 05/30/2024 - Reviewed 05/30/2024   Allergen Reaction Noted    Baclofen Itching and Anxiety 06/17/2019       WOC Assessment Details/Treatment        05/31/24 1146   WOCN Assessment   Visit Date 05/31/24   Visit Time 1146   Consult Type New   WOCN Speciality Wound   WOCN List wound vac   Wound pressure   Procedure wound vac   Intervention assessed;applied;orders   Teaching on-going   Skin Interventions   Device Skin Pressure Protection absorbent pad utilized/changed   Pressure Reduction Devices specialty bed utilized        Altered Skin Integrity 01/09/24 0848 upper Sacral spine   Date First Assessed/Time First Assessed: 01/09/24 0848   Altered Skin Integrity Present on Admission - Did Patient arrive to the hospital with altered skin?: suspected hospital acquired  Orientation: upper  Location: Sacral spine   Wound Image    Description of Altered Skin Integrity Full thickness tissue loss with exposed bone, tendon, or muscle. Often includes undermining and tunneling. May extend into muscle and/or supporting structures.   Dressing Appearance Dry;Intact;Clean   Drainage Amount Moderate   Drainage Characteristics/Odor Serosanguineous   Appearance Red;Yellow;Fibrin   Tissue loss description Full thickness   Red (%), Wound Tissue Color 75 %   Yellow (%), Wound Tissue Color 25 %   Periwound Area Scar tissue   Wound Edges Defined   Wound Length (cm) 6 cm   Wound Width (cm) 6 cm   Wound Depth (cm) 2 cm   Wound Volume (cm^3) 72 cm^3   Wound Surface Area (cm^2) 36 cm^2   Care Cleansed with:;Sterile normal saline   Dressing Applied  (NPWT)         Wound 05/30/24 0000 Pressure Injury Right Buttocks   Date First Assessed/Time First Assessed: 05/30/24 0000   Primary Wound Type: Pressure Injury  Side: Right  Location: Buttocks  Is this injury device related?: No   Wound Image    Pressure Injury Stage 3   Dressing Appearance Open to air   Drainage Amount Small   Drainage Characteristics/Odor Serosanguineous   Appearance Pink;Red   Tissue loss description Full thickness   Red (%), Wound Tissue Color 100 %   Periwound Area Intact;Scar tissue   Wound Edges Approximated   Wound Length (cm) 2 cm   Wound Width (cm) 2 cm   Wound Depth (cm) 0.3 cm   Wound Volume (cm^3) 1.2 cm^3   Wound Surface Area (cm^2) 4 cm^2   Care Cleansed with:;Sterile normal saline   Dressing Applied;Calcium alginate        Negative Pressure Wound Therapy  05/31/24   Placement Date: 05/31/24   Location: Sacral Spine   NPWT Type Vacuum Therapy   Therapy Setting NPWT Continuous therapy   Pressure Setting NPWT 125 mmHg   Sponges Inserted NPWT Black     Wound care consulted to replace wound vac dressing to sacrum. Patient came in with home wound vac but forgot . Dressing removed and cleansed. Wound evaluated and images taken, Wound vac dressing reapplied to sacrum with excellent seal noted at 125 mm /hg continuous. Right buttocks: Cleanse with NS. Apply calcium alginate with Ag, cover with 4x4, secure with medipore tape. Change daily. Nursing to continue with preventative measures. MARY mattress ordered. Will follow up.     05/31/2024

## 2024-05-31 NOTE — PROCEDURES
"Bianca Khan is a 59 y.o. male patient.    Temp: (P) 97.2 °F (36.2 °C) (05/31/24 0838)  Pulse: (P) 82 (05/31/24 0838)  Resp: (P) 18 (05/31/24 0838)  BP: (P) 125/66 (05/31/24 0838)  SpO2: (P) 98 % (05/31/24 0838)  Weight: 71.2 kg (156 lb 15.5 oz) (05/31/24 0748)  Height: 5' 10" (177.8 cm) (05/31/24 0748)       Bladder Cath    Date/Time: 5/31/2024 11:49 AM  Location procedure was performed: [facility] UROLOGY    Performed by: Cyn Garcia FNP  Authorized by: Cyn Garcia FNP  Indications: catheter change  Local anesthesia used: no    Anesthesia:  Local anesthesia used: no    Patient sedated: no  Preparation: Patient was prepped and draped in the usual sterile fashion.  Catheter insertion: indwelling  Catheter type: Stover  Catheter size: 20 Fr  Complicated insertion: no  Altered anatomy: no  Bladder irrigation: no  Number of attempts: 1  Urine characteristics: mildly cloudy  Complications: No  Specimens: No  Implants: No  Patient tolerance: Patient tolerated the procedure well with no immediate complications          5/31/2024    "

## 2024-05-31 NOTE — PROGRESS NOTES
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 See orders

## 2024-05-31 NOTE — PLAN OF CARE
Patient lives with spouse in a single story home with ramp. DME in home: hospital bed, WC, mobility chair. Patient is current with [facility]. PCP: PRIYA Mota, Pharmacy: [facility]     05/31/24 0940   Discharge Assessment   Assessment Type Discharge Planning Assessment   Confirmed/corrected address, phone number and insurance Yes   Confirmed Demographics Correct on Facesheet   Source of Information patient;family   Reason For Admission Presents via [facility] for generalized weakness that began 2 weeks after he started fasting. Pt reports decreased appetite and chronic neck pain. Took 162mg ASA today.   People in Home spouse   Do you expect to return to your current living situation? Yes   Do you have help at home or someone to help you manage your care at home? Yes   Prior to hospitilization cognitive status: Alert/Oriented   Current cognitive status: Alert/Oriented   Home Accessibility wheelchair accessible   Home Layout Able to live on 1st floor   Equipment Currently Used at Home wheelchair;hospital bed   Readmission within 30 days? No   Patient currently being followed by outpatient case management? No   Do you currently have service(s) that help you manage your care at home? Yes   Name and Contact number of agency [facility]   Is the pt/caregiver preference to resume services with current agency Yes   Do you take prescription medications? Yes   Do you have prescription coverage? Yes   Coverage Medicare/Take Charge Medicaid   Do you have any problems affording any of your prescribed medications? No   Is the patient taking medications as prescribed? yes   Are you on dialysis? No   Do you take coumadin? No   Discharge Plan A Home Health   Discharge Plan B Home Health   DME Needed Upon Discharge  other (see comments)  (TBD)   Discharge Plan discussed with: Patient   Transition of Care Barriers None
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
## 2024-05-31 NOTE — PROGRESS NOTES
"Pharmacokinetic Initial Assessment: IV Vancomycin    Assessment/Plan:    Initiate intravenous vancomycin with loading dose of 1500 mg once followed by a maintenance dose of vancomycin 1500 mg IV every 24 hours  Desired empiric serum trough concentration is 15 to 20 mcg/mL  Draw vancomycin trough level 60 min prior to third dose on 06/01/24 at approximately 2030  Pharmacy will continue to follow and monitor vancomycin.      Please contact pharmacy at extension [phone] with any questions regarding this assessment.     Thank you for the consult,   Veronica Garcia       Patient brief summary:  Bianca Khan is a 59 y.o. male initiated on antimicrobial therapy with IV Vancomycin for treatment of suspected bone/joint infection    Drug Allergies:   Review of patient's allergies indicates:   Allergen Reactions    Baclofen Itching and Anxiety       Actual Body Weight:   78.5 kg    Renal Function:   Estimated Creatinine Clearance: 61.9 mL/min (A) (based on SCr of 1.27 mg/dL (H)).,     Dialysis Method (if applicable):  N/A    CBC (last 72 hours):  Recent Labs   Lab Result Units 05/30/24  1431   WBC x10(3)/mcL 11.37   Hgb g/dL 8.1*   Hct % 26.1*   Platelet x10(3)/mcL 318   Mono % % 10.1   Eos % % 0.1   Basophil % % 0.2       Metabolic Panel (last 72 hours):  Recent Labs   Lab Result Units 05/30/24  1426 05/30/24  1431   Sodium mmol/L  --  138   Potassium mmol/L  --  4.0   Chloride mmol/L  --  109*   CO2 mmol/L  --  23   Glucose mg/dL  --  119*   Glucose, UA  Normal  --    Blood Urea Nitrogen mg/dL  --  13.2   Creatinine mg/dL  --  1.27*   Albumin g/dL  --  2.5*   Bilirubin Total mg/dL  --  0.5   ALP unit/L  --  74   AST unit/L  --  21   ALT unit/L  --  13       Drug levels (last 3 results):  No results for input(s): "VANCOMYCINRA", "VANCORANDOM", "VANCOMYCINPE", "VANCOPEAK", "VANCOMYCINTR", "VANCOTROUGH" in the last 72 hours.    Microbiologic Results:  Microbiology Results (last 7 days)       Procedure Component Value Units " Date/Time    Wound Culture [[id number]]     Order Status: Sent Specimen: Wound from Buttocks     Blood culture x two cultures. Draw prior to antibiotics. [[id number]] Collected: 05/30/24 1528    Order Status: Resulted Specimen: Blood Updated: 05/30/24 1636    Blood culture x two cultures. Draw prior to antibiotics. [[id number]] Collected: 05/30/24 1528    Order Status: Resulted Specimen: Blood Updated: 05/30/24 1636    Urine culture [[id number]] Collected: 05/30/24 1426    Order Status: Sent Specimen: Urine Updated: 05/30/24 1525
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
## 2024-05-31 NOTE — PLAN OF CARE
Problem: Skin Injury Risk Increased  Goal: Skin Health and Integrity  Outcome: Progressing     Problem: Adult Inpatient Plan of Care  Goal: Plan of Care Review  Outcome: Progressing  Goal: Patient-Specific Goal (Individualized)  Outcome: Progressing  Goal: Absence of Hospital-Acquired Illness or Injury  Outcome: Progressing  Goal: Optimal Comfort and Wellbeing  Outcome: Progressing  Goal: Readiness for Transition of Care  Outcome: Progressing     Problem: Infection  Goal: Absence of Infection Signs and Symptoms  Outcome: Progressing     Problem: Wound  Goal: Optimal Coping  Outcome: Progressing  Goal: Optimal Functional Ability  Outcome: Progressing  Goal: Absence of Infection Signs and Symptoms  Outcome: Progressing  Goal: Improved Oral Intake  Outcome: Progressing  Goal: Optimal Pain Control and Function  Outcome: Progressing  Goal: Skin Health and Integrity  Outcome: Progressing  Goal: Optimal Wound Healing  Outcome: Progressing     Problem: Sepsis/Septic Shock  Goal: Optimal Coping  Outcome: Progressing  Goal: Absence of Bleeding  Outcome: Progressing  Goal: Blood Glucose Level Within Targeted Range  Outcome: Progressing  Goal: Absence of Infection Signs and Symptoms  Outcome: Progressing  Goal: Optimal Nutrition Intake  Outcome: Progressing     Problem: Diabetes Comorbidity  Goal: Blood Glucose Level Within Targeted Range  Outcome: Progressing
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
## 2024-05-31 NOTE — CONSULTS
Bianca Khan 1965  [id number]  5/31/2024    CONSULTING PHYSICIAN: Agustín    Reason for consult:  SP catheter change    HPI: Mr. Khan is a 58 yo male with a past medical history of 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 and neurogenic bladder status post SP tube, diabetes mellitus, hypertension, atrial fibrillation on Xarelto, sick sinus syndrome status post pacemaker, DVT with IVC filter chronic hep C, anxiety, depression and multiple sacral decubitus ulcers on suppressive doxycycline and ampicillin following right knee septic arthritis and femur osteomyelitis in November of 2023.  Presented to the emergency department with generalized weakness over a few weeks with fever, intermittent confusion and lethargy. Workup reveals sepsis secondary to infected sacral decubitus and UTI.  He is a patient of Dr. Simental. He has his SP tube exchanged every 2 weeks per NSI.  He reports it was last exchanged 2 weeks ago.      Past Medical History:   Diagnosis Date    Arthritis     Chronic ulcer of ankle 05/26/2022    Frequent UTI 07/02/2019    Generalized anxiety disorder 05/26/2022    Neurogenic bladder 05/26/2022    Osteomyelitis 05/26/2022    Presence of suprapubic catheter 05/26/2022    Pure hypercholesterolemia 05/26/2022    Retention of urine, unspecified 08/09/2019    Spinal cord injury at T1-T6 level 04/20/2018     Past Surgical History:   Procedure Laterality Date    FRACTURE SURGERY  2021    INCISION AND DRAINAGE, LOWER EXTREMITY Right 06/29/2023    Procedure: INCISION AND DRAINAGE, LOWER EXTREMITY;  Surgeon: Prabhu Shen DO;  Location: [facility];  Service: Orthopedics;  Laterality: Right;  supine bone foam wash stuff cultures    INCISION AND DRAINAGE, LOWER EXTREMITY Right 11/30/2023    Procedure: INCISION AND DRAINAGE, LOWER EXTREMITY;  Surgeon: Prabhu Shen DO;  Location: [facility] OR;  Service: Orthopedics;  Laterality: Right;  supine any table bone foam wash stuff possible wound vac    INCISION AND  DRAINAGE, LOWER EXTREMITY Right 2023    Procedure: INCISION AND DRAINAGE, LOWER EXTREMITY;  Surgeon: Prabhu Shen DO;  Location: [facility] OR;  Service: Orthopedics;  Laterality: Right;  supine bone foam vascular bed removal of distal femoral plate, wash stuff, possible AKA    INSERTION OF INTRAMEDULLARY MARISA Right 08/10/2022    Procedure: INSERTION, INTRAMEDULLARY MARISA RIGHT TIBIA;  Surgeon: Jorge Orellana MD;  Location: [facility] OR;  Service: Orthopedics;  Laterality: Right;    JOINT REPLACEMENT      Ankel    REMOVAL OF HARDWARE FROM LOWER EXTREMITY Right 2023    Procedure: REMOVAL, HARDWARE, LOWER EXTREMITY;  Surgeon: Prabhu Shen DO;  Location: [facility] OR;  Service: Orthopedics;  Laterality: Right;    SPINE SURGERY  2018     Family History   Problem Relation Name Age of Onset    No Known Problems Mother      No Known Problems Father       Social History     Tobacco Use    Smoking status: Some Days     Current packs/day: 0.00     Average packs/day: 0.2 packs/day for 41.5 years (10.4 ttl pk-yrs)     Types: Cigars, Cigarettes     Start date: 1982     Last attempt to quit: 2023     Years since quittin.8    Smokeless tobacco: Never   Substance Use Topics    Alcohol use: Not Currently     Alcohol/week: 2.0 standard drinks of alcohol     Types: 2 Cans of beer per week    Drug use: Not Currently     Types: Oxycodone     Current Facility-Administered Medications   Medication Dose Route Frequency Provider Last Rate Last Admin    0.9%  NaCl infusion (for blood administration)   Intravenous Q24H PRN Sasha Atkins MD        acetaminophen tablet 650 mg  650 mg Oral Q4H PRN Shruthi Randolph MD        aluminum-magnesium hydroxide-simethicone 200-200-20 mg/5 mL suspension 30 mL  30 mL Oral QID PRN Shruthi Randolph MD        atorvastatin tablet 20 mg  20 mg Oral Nightly Shruthi Randolph MD   20 mg at 24 2350    famotidine tablet 20 mg  20 mg Oral BID Shruthi Randolph MD   20 mg at 24 0830     fenofibrate tablet 48 mg  48 mg Oral Daily AgustínShruthi MD   48 mg at 05/31/24 0830    ferrous sulfate tablet 1 each  1 tablet Oral Daily AgustínShruthi MD   1 each at 05/31/24 0830    FLUoxetine capsule 20 mg  20 mg Oral Daily Agustín, Shruthi LUCERO MD   20 mg at 05/31/24 0830    folic acid tablet 1 mg  1 mg Oral Daily AgustínShruthi MD        gabapentin capsule 300 mg  300 mg Oral TID AgustínShruthi MD   300 mg at 05/31/24 0830    lactated ringers infusion   Intravenous Continuous AgustínShruthi  mL/hr at 05/31/24 0005 New Bag at 05/31/24 0005    meropenem (MERREM) 1 g in sodium chloride 0.9 % 100 mL IVPB (MB+)  1 g Intravenous Q8H AgustínShruthi MD        methocarbamoL tablet 750 mg  750 mg Oral BID PRN AgustínShruthi MD   750 mg at 05/31/24 0830    mupirocin 2 % ointment   Nasal BID AgustínShruthi MD   Given at 05/31/24 0830    ondansetron injection 4 mg  4 mg Intravenous Q4H PRN AgustínShruthi reinoso MD        oxybutynin tablet 5 mg  5 mg Oral BID AgustínShruthi MD   5 mg at 05/31/24 0830    oxyCODONE-acetaminophen  mg per tablet 1 tablet  1 tablet Oral Q6H PRN AgustínShruthi reinoso MD   1 tablet at 05/30/24 2350    polyethylene glycol packet 17 g  17 g Oral BID PRN AgustínShruthi MD        prochlorperazine injection Soln 5 mg  5 mg Intravenous Q6H PRN Shruthi Randolph MD        rivaroxaban tablet 20 mg  20 mg Oral Daily AgustínShruthi MD        senna-docusate 8.6-50 mg per tablet 2 tablet  2 tablet Oral BID PRN Shruthi Randolph MD        sodium chloride 0.9% flush 10 mL  10 mL Intravenous PRN Shruthi Randolph MD        traZODone tablet 100 mg  100 mg Oral QHS AgustínShruthi MD   100 mg at 05/30/24 2350    zolpidem tablet 10 mg  10 mg Oral Nightly PRN AgustínShruthi reinoso MD         Review of patient's allergies indicates:   Allergen Reactions    Baclofen Itching and Anxiety     ROS: 12 point review of systems negative other than the HPI    PHYSICAL EXAM:  Vitals:    05/31/24 0748 05/31/24 0821 05/31/24 0830 05/31/24 0838   BP:  134/74 (!) 145/80  "(P) 125/66   Pulse:  70 73 (P) 82   Resp:   18 (P) 18   Temp:  97.9 °F (36.6 °C) 97.1 °F (36.2 °C) (P) 97.2 °F (36.2 °C)   TempSrc:  Oral Axillary (P) Axillary   SpO2:  98% 98% (P) 98%   Weight: 71.2 kg (156 lb 15.5 oz)      Height: 5' 10" (1.778 m)          Intake/Output Summary (Last 24 hours) at 5/31/2024 1141  Last data filed at 5/31/2024 0742  Gross per 24 hour   Intake 100 ml   Output 1800 ml   Net -1700 ml       GEN: WN/WD NAD  HEENT: NCAT, PERRLA, EOMI, OP clear, nares patent  CV: RRR  RESP: Even and unlabored  ABD:  Soft, nontender, nondistended  :  Rodriguez urine with some cloudy sediment in  bag  EXT: no C/C/E  NEURO: AAOx4      LABS:  Recent Results (from the past 24 hour(s))   EKG 12-lead    Collection Time: 05/30/24  1:31 PM   Result Value Ref Range    QRS Duration 90 ms    [facility] QTC Calculation 438 ms   COVID/RSV/FLU A&B PCR    Collection Time: 05/30/24  2:26 PM   Result Value Ref Range    Influenza A PCR Not Detected Not Detected    Influenza B PCR Not Detected Not Detected    Respiratory Syncytial Virus PCR Not Detected Not Detected    SARS-CoV-2 PCR Not Detected Not Detected, Negative   Urinalysis, Reflex to Urine Culture    Collection Time: 05/30/24  2:26 PM    Specimen: Urine   Result Value Ref Range    Color, UA Yellow Yellow, Light-Yellow, Colorless, Straw, Dark-Yellow    Appearance, UA Turbid (A) Clear    Specific Gravity, UA 1.027 1.005 - 1.030    pH, UA 5.5 5.0 - 8.5    Protein, UA 1+ (A) Negative    Glucose, UA Normal Negative, Normal    Ketones, UA Negative Negative    Blood, UA 2+ (A) Negative    Bilirubin, UA Negative Negative    Urobilinogen, UA Normal 0.2, 1.0, Normal    Nitrites, UA 2+ (A) Negative    Leukocyte Esterase,  (A) Negative    WBC, UA >100 (A) None Seen, 0-2, 3-5, 0-5 /HPF    WBC Clumps, UA Many (A) None Seen, 0-5    Bacteria, UA Few (A) None Seen, Trace /HPF    Squamous Epithelial Cells, UA Trace None Seen /HPF    Mucous, UA Trace (A) None Seen /LPF    Hyaline Casts, " UA 3-5 (A) None Seen /lpf    RBC, UA >100 (A) None Seen, 0-2, 3-5, 0-5 /HPF   Urine culture    Collection Time: 05/30/24  2:26 PM    Specimen: Urine   Result Value Ref Range    Urine Culture >/= 100,000 colonies/ml Gram-negative Rods (A)    Drug Screen, Urine    Collection Time: 05/30/24  2:26 PM   Result Value Ref Range    Amphetamines, Urine Positive (A) Negative    Barbiturates, Urine Negative Negative    Benzodiazepine, Urine Positive (A) Negative    Cannabinoids, Urine Negative Negative    Cocaine, Urine Negative Negative    Fentanyl, Urine Negative Negative    MDMA, Urine Positive (A) Negative    Opiates, Urine Positive (A) Negative    Phencyclidine, Urine Negative Negative    pH, Urine 5.5 3.0 - 11.0    Specific Gravity, Urine Auto 1.027 1.001 - 1.035   Comprehensive metabolic panel    Collection Time: 05/30/24  2:31 PM   Result Value Ref Range    Sodium 138 136 - 145 mmol/L    Potassium 4.0 3.5 - 5.1 mmol/L    Chloride 109 (H) 98 - 107 mmol/L    CO2 23 23 - 31 mmol/L    Glucose 119 (H) 82 - 115 mg/dL    Blood Urea Nitrogen 13.2 8.4 - 25.7 mg/dL    Creatinine 1.27 (H) 0.73 - 1.18 mg/dL    Calcium 8.1 (L) 8.8 - 10.0 mg/dL    Protein Total 6.1 5.8 - 7.6 gm/dL    Albumin 2.5 (L) 3.4 - 4.8 g/dL    Globulin 3.6 (H) 2.4 - 3.5 gm/dL    Albumin/Globulin Ratio 0.7 (L) 1.1 - 2.0 ratio    Bilirubin Total 0.5 <=1.5 mg/dL    ALP 74 40 - 150 unit/L    ALT 13 0 - 55 unit/L    AST 21 5 - 34 unit/L    eGFR >60 mL/min/1.73/m2    Anion Gap 6.0 mEq/L    BUN/Creatinine Ratio 10    Lipase    Collection Time: 05/30/24  2:31 PM   Result Value Ref Range    Lipase Level 9 <=60 U/L   Troponin I    Collection Time: 05/30/24  2:31 PM   Result Value Ref Range    Troponin-I <0.010 0.000 - 0.045 ng/mL   CBC with Differential    Collection Time: 05/30/24  2:31 PM   Result Value Ref Range    WBC 11.37 4.50 - 11.50 x10(3)/mcL    RBC 3.15 (L) 4.70 - 6.10 x10(6)/mcL    Hgb 8.1 (L) 14.0 - 18.0 g/dL    Hct 26.1 (L) 42.0 - 52.0 %    MCV 82.9 80.0 -  94.0 fL    MCH 25.7 (L) 27.0 - 31.0 pg    MCHC 31.0 (L) 33.0 - 36.0 g/dL    RDW 20.9 (H) 11.5 - 17.0 %    Platelet 318 130 - 400 x10(3)/mcL    MPV 9.2 7.4 - 10.4 fL    Neut % 72.7 %    Lymph % 16.5 %    Mono % 10.1 %    Eos % 0.1 %    Basophil % 0.2 %    Lymph # 1.88 0.6 - 4.6 x10(3)/mcL    Neut # 8.27 2.1 - 9.2 x10(3)/mcL    Mono # 1.15 0.1 - 1.3 x10(3)/mcL    Eos # 0.01 0 - 0.9 x10(3)/mcL    Baso # 0.02 <=0.2 x10(3)/mcL    IG# 0.04 0 - 0.04 x10(3)/mcL    IG% 0.4 %    NRBC% 0.0 %   Type & Screen    Collection Time: 05/30/24  3:29 PM   Result Value Ref Range    Group & Rh O POS     Indirect Elizabeth GEL NEG     Specimen Outdate 06/02/2024 23:59    Prepare RBC 1 Unit    Collection Time: 05/30/24  3:29 PM   Result Value Ref Range    UNIT NUMBER [id number]     UNIT ABO/RH O POS     DISPENSE STATUS Issued     Unit Expiration [id number]     Product Code Q9271D12     Unit Blood Type Code 5100     CROSSMATCH INTERPRETATION Compatible    Lactic Acid, Plasma    Collection Time: 05/30/24  9:29 PM   Result Value Ref Range    Lactic Acid Level 0.5 0.5 - 2.2 mmol/L   MRSA PCR    Collection Time: 05/31/24 12:01 AM   Result Value Ref Range    MRSA PCR Screen Not Detected Not Detected   POCT glucose    Collection Time: 05/31/24  2:58 AM   Result Value Ref Range    POCT Glucose 89 70 - 110 mg/dL   C-Reactive Protein    Collection Time: 05/31/24  3:51 AM   Result Value Ref Range    .50 (H) <5.00 mg/L   Sedimentation rate    Collection Time: 05/31/24  3:51 AM   Result Value Ref Range    Sed Rate 66 (H) 0 - 15 mm/hr   Comprehensive Metabolic Panel    Collection Time: 05/31/24  3:51 AM   Result Value Ref Range    Sodium 139 136 - 145 mmol/L    Potassium 3.9 3.5 - 5.1 mmol/L    Chloride 109 (H) 98 - 107 mmol/L    CO2 23 23 - 31 mmol/L    Glucose 104 82 - 115 mg/dL    Blood Urea Nitrogen 7.6 (L) 8.4 - 25.7 mg/dL    Creatinine 0.77 0.73 - 1.18 mg/dL    Calcium 8.0 (L) 8.8 - 10.0 mg/dL    Protein Total 5.3 (L) 5.8 - 7.6 gm/dL     Albumin 2.1 (L) 3.4 - 4.8 g/dL    Globulin 3.2 2.4 - 3.5 gm/dL    Albumin/Globulin Ratio 0.7 (L) 1.1 - 2.0 ratio    Bilirubin Total 0.4 <=1.5 mg/dL    ALP 65 40 - 150 unit/L    ALT 13 0 - 55 unit/L    AST 18 5 - 34 unit/L    eGFR >60 mL/min/1.73/m2    Anion Gap 7.0 mEq/L    BUN/Creatinine Ratio 10    Magnesium    Collection Time: 05/31/24  3:51 AM   Result Value Ref Range    Magnesium Level 1.60 1.60 - 2.60 mg/dL   Phosphorus    Collection Time: 05/31/24  3:51 AM   Result Value Ref Range    Phosphorus Level 3.1 2.3 - 4.7 mg/dL   Iron and TIBC    Collection Time: 05/31/24  3:51 AM   Result Value Ref Range    Iron Binding Capacity Unsaturated 114 69 - 240 ug/dL    Iron Level 9 (L) 65 - 175 ug/dL    Transferrin 112 (L) 174 - 364 mg/dL    Iron Binding Capacity Total 123 (L) 250 - 450 ug/dL    Iron Saturation 7 (L) 20 - 50 %   Ferritin    Collection Time: 05/31/24  3:51 AM   Result Value Ref Range    Ferritin Level 255.79 21.81 - 274.66 ng/mL   Vitamin B12    Collection Time: 05/31/24  3:51 AM   Result Value Ref Range    Vitamin B12 826 (H) 213 - 816 pg/mL   Folate    Collection Time: 05/31/24  3:51 AM   Result Value Ref Range    Folate Level 6.4 (L) 7.0 - 31.4 ng/mL   CBC with Differential    Collection Time: 05/31/24  3:51 AM   Result Value Ref Range    WBC 12.08 (H) 4.50 - 11.50 x10(3)/mcL    RBC 2.96 (L) 4.70 - 6.10 x10(6)/mcL    Hgb 7.9 (L) 14.0 - 18.0 g/dL    Hct 25.0 (L) 42.0 - 52.0 %    MCV 84.5 80.0 - 94.0 fL    MCH 26.7 (L) 27.0 - 31.0 pg    MCHC 31.6 (L) 33.0 - 36.0 g/dL    RDW 21.2 (H) 11.5 - 17.0 %    Platelet 355 130 - 400 x10(3)/mcL    MPV 9.5 7.4 - 10.4 fL    Neut % 70.9 %    Lymph % 19.1 %    Mono % 8.8 %    Eos % 0.6 %    Basophil % 0.2 %    Lymph # 2.31 0.6 - 4.6 x10(3)/mcL    Neut # 8.57 2.1 - 9.2 x10(3)/mcL    Mono # 1.06 0.1 - 1.3 x10(3)/mcL    Eos # 0.07 0 - 0.9 x10(3)/mcL    Baso # 0.02 <=0.2 x10(3)/mcL    IG# 0.05 (H) 0 - 0.04 x10(3)/mcL    IG% 0.4 %    NRBC% 0.0 %   Vitamin D    Collection Time:  05/31/24  3:51 AM   Result Value Ref Range    Vitamin D 24 (L) 30 - 80 ng/mL         IMAGING:  EXAMINATION:  XR CHEST AP PORTABLE    CLINICAL HISTORY:  Sepsis;    COMPARISON:  Earlier today    FINDINGS:  Frontal view of the chest was obtained. Heart and mediastinum unchanged.  Suspect some mild atelectasis.  Otherwise grossly clear lungs.  No pneumothorax.   Impression:       No acute findings.      Electronically signed by: Mikal White  Date: 05/30/2024  Time: 15:38         ASSESSMENT:  59-year-old male paraplegic secondary to spinal cord injury with neurogenic bladder managed with a suprapubic catheter which is changed every 2 weeks per home health admitted with sepsis secondary to infected sacral decubitus ulcer and possible UTI.  Preliminary urine cultures growing out greater than 100,000 CFU per mL Gram-negative rods empirically being treated with Merrem.      PLAN:  SP tube exchange today, continue with exchanges every 2 weeks.  Continue empiric antibiotics following urine culture and tailor appropriately.    SWAPNIL Barrera

## 2024-06-01 LAB
ALBUMIN SERPL-MCNC: 2.2 G/DL (ref 3.4–4.8)
ALBUMIN/GLOB SERPL: 0.6 RATIO (ref 1.1–2)
ALP SERPL-CCNC: 75 UNIT/L (ref 40–150)
ALT SERPL-CCNC: 10 UNIT/L (ref 0–55)
ANION GAP SERPL CALC-SCNC: 8 MEQ/L
AST SERPL-CCNC: 14 UNIT/L (ref 5–34)
BASOPHILS # BLD AUTO: 0.02 X10(3)/MCL
BASOPHILS NFR BLD AUTO: 0.2 %
BILIRUB SERPL-MCNC: 0.5 MG/DL
BUN SERPL-MCNC: 5.7 MG/DL (ref 8.4–25.7)
CALCIUM SERPL-MCNC: 8.3 MG/DL (ref 8.8–10)
CHLORIDE SERPL-SCNC: 103 MMOL/L (ref 98–107)
CO2 SERPL-SCNC: 28 MMOL/L (ref 23–31)
CREAT SERPL-MCNC: 0.76 MG/DL (ref 0.73–1.18)
CREAT/UREA NIT SERPL: 8
EOSINOPHIL # BLD AUTO: 0.06 X10(3)/MCL (ref 0–0.9)
EOSINOPHIL NFR BLD AUTO: 0.6 %
ERYTHROCYTE [DISTWIDTH] IN BLOOD BY AUTOMATED COUNT: 19.4 % (ref 11.5–17)
GFR SERPLBLD CREATININE-BSD FMLA CKD-EPI: >60 ML/MIN/1.73/M2
GLOBULIN SER-MCNC: 3.6 GM/DL (ref 2.4–3.5)
GLUCOSE SERPL-MCNC: 111 MG/DL (ref 82–115)
HCT VFR BLD AUTO: 28.7 % (ref 42–52)
HGB BLD-MCNC: 9.2 G/DL (ref 14–18)
IMM GRANULOCYTES # BLD AUTO: 0.03 X10(3)/MCL (ref 0–0.04)
IMM GRANULOCYTES NFR BLD AUTO: 0.3 %
LYMPHOCYTES # BLD AUTO: 2.49 X10(3)/MCL (ref 0.6–4.6)
LYMPHOCYTES NFR BLD AUTO: 24.2 %
MAGNESIUM SERPL-MCNC: 1.5 MG/DL (ref 1.6–2.6)
MCH RBC QN AUTO: 26.2 PG (ref 27–31)
MCHC RBC AUTO-ENTMCNC: 32.1 G/DL (ref 33–36)
MCV RBC AUTO: 81.8 FL (ref 80–94)
MONOCYTES # BLD AUTO: 1.03 X10(3)/MCL (ref 0.1–1.3)
MONOCYTES NFR BLD AUTO: 10 %
NEUTROPHILS # BLD AUTO: 6.67 X10(3)/MCL (ref 2.1–9.2)
NEUTROPHILS NFR BLD AUTO: 64.7 %
NRBC BLD AUTO-RTO: 0 %
PLATELET # BLD AUTO: 391 X10(3)/MCL (ref 130–400)
PMV BLD AUTO: 9.8 FL (ref 7.4–10.4)
POCT GLUCOSE: 112 MG/DL (ref 70–110)
POTASSIUM SERPL-SCNC: 3.9 MMOL/L (ref 3.5–5.1)
PROT SERPL-MCNC: 5.8 GM/DL (ref 5.8–7.6)
RBC # BLD AUTO: 3.51 X10(6)/MCL (ref 4.7–6.1)
SODIUM SERPL-SCNC: 139 MMOL/L (ref 136–145)
WBC # SPEC AUTO: 10.3 X10(3)/MCL (ref 4.5–11.5)

## 2024-06-01 PROCEDURE — 25000003 PHARM REV CODE 250: Performed by: INTERNAL MEDICINE

## 2024-06-01 PROCEDURE — 25000003 PHARM REV CODE 250: Performed by: NURSE PRACTITIONER

## 2024-06-01 PROCEDURE — 85025 COMPLETE CBC W/AUTO DIFF WBC: CPT | Performed by: INTERNAL MEDICINE

## 2024-06-01 PROCEDURE — 83735 ASSAY OF MAGNESIUM: CPT | Performed by: INTERNAL MEDICINE

## 2024-06-01 PROCEDURE — 21400001 HC TELEMETRY ROOM

## 2024-06-01 PROCEDURE — 80053 COMPREHEN METABOLIC PANEL: CPT | Performed by: INTERNAL MEDICINE

## 2024-06-01 PROCEDURE — 63600175 PHARM REV CODE 636 W HCPCS: Performed by: INTERNAL MEDICINE

## 2024-06-01 PROCEDURE — 36415 COLL VENOUS BLD VENIPUNCTURE: CPT | Performed by: INTERNAL MEDICINE

## 2024-06-01 PROCEDURE — 36410 VNPNXR 3YR/> PHY/QHP DX/THER: CPT

## 2024-06-01 PROCEDURE — C1751 CATH, INF, PER/CENT/MIDLINE: HCPCS

## 2024-06-01 RX ORDER — OXYCODONE AND ACETAMINOPHEN 10; 325 MG/1; MG/1
1 TABLET ORAL EVERY 4 HOURS PRN
Status: DISCONTINUED | OUTPATIENT
Start: 2024-06-01 | End: 2024-06-04 | Stop reason: HOSPADM

## 2024-06-01 RX ADMIN — METHOCARBAMOL 750 MG: 750 TABLET ORAL at 06:06

## 2024-06-01 RX ADMIN — OXYCODONE HYDROCHLORIDE AND ACETAMINOPHEN 1 TABLET: 10; 325 TABLET ORAL at 12:06

## 2024-06-01 RX ADMIN — OXYCODONE HYDROCHLORIDE AND ACETAMINOPHEN 1 TABLET: 10; 325 TABLET ORAL at 06:06

## 2024-06-01 RX ADMIN — FAMOTIDINE 20 MG: 20 TABLET, FILM COATED ORAL at 08:06

## 2024-06-01 RX ADMIN — ACETAMINOPHEN 325MG 650 MG: 325 TABLET ORAL at 08:06

## 2024-06-01 RX ADMIN — GABAPENTIN 300 MG: 300 CAPSULE ORAL at 08:06

## 2024-06-01 RX ADMIN — OXYCODONE HYDROCHLORIDE AND ACETAMINOPHEN 1 TABLET: 10; 325 TABLET ORAL at 01:06

## 2024-06-01 RX ADMIN — MUPIROCIN: 20 OINTMENT TOPICAL at 08:06

## 2024-06-01 RX ADMIN — OXYBUTYNIN CHLORIDE 5 MG: 5 TABLET ORAL at 08:06

## 2024-06-01 RX ADMIN — RIVAROXABAN 20 MG: 10 TABLET, FILM COATED ORAL at 04:06

## 2024-06-01 RX ADMIN — GABAPENTIN 300 MG: 300 CAPSULE ORAL at 03:06

## 2024-06-01 RX ADMIN — MEROPENEM 1 G: 1 INJECTION INTRAVENOUS at 04:06

## 2024-06-01 RX ADMIN — METHOCARBAMOL 750 MG: 750 TABLET ORAL at 12:06

## 2024-06-01 RX ADMIN — FOLIC ACID 1 MG: 1 TABLET ORAL at 08:06

## 2024-06-01 RX ADMIN — OXYCODONE HYDROCHLORIDE AND ACETAMINOPHEN 1 TABLET: 10; 325 TABLET ORAL at 07:06

## 2024-06-01 RX ADMIN — DOXYCYCLINE HYCLATE 100 MG: 100 TABLET, COATED ORAL at 08:06

## 2024-06-01 RX ADMIN — ERGOCALCIFEROL 50000 UNITS: 1.25 CAPSULE ORAL at 08:06

## 2024-06-01 RX ADMIN — FLUOXETINE HYDROCHLORIDE 20 MG: 20 CAPSULE ORAL at 08:06

## 2024-06-01 RX ADMIN — METHOCARBAMOL 750 MG: 750 TABLET ORAL at 07:06

## 2024-06-01 RX ADMIN — FENOFIBRATE 48 MG: 48 TABLET, FILM COATED ORAL at 08:06

## 2024-06-01 RX ADMIN — TRAZODONE HYDROCHLORIDE 100 MG: 100 TABLET ORAL at 08:06

## 2024-06-01 RX ADMIN — FERROUS SULFATE TAB 325 MG (65 MG ELEMENTAL FE) 1 EACH: 325 (65 FE) TAB at 08:06

## 2024-06-01 RX ADMIN — ATORVASTATIN CALCIUM 20 MG: 10 TABLET, FILM COATED ORAL at 08:06

## 2024-06-01 RX ADMIN — MEROPENEM 1 G: 1 INJECTION INTRAVENOUS at 03:06
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
## 2024-06-02 LAB
ALBUMIN SERPL-MCNC: 2.6 G/DL (ref 3.4–4.8)
ALBUMIN/GLOB SERPL: 0.6 RATIO (ref 1.1–2)
ALP SERPL-CCNC: 84 UNIT/L (ref 40–150)
ALT SERPL-CCNC: 12 UNIT/L (ref 0–55)
ANION GAP SERPL CALC-SCNC: 10 MEQ/L
AST SERPL-CCNC: 18 UNIT/L (ref 5–34)
BASOPHILS # BLD AUTO: 0.02 X10(3)/MCL
BASOPHILS NFR BLD AUTO: 0.2 %
BILIRUB SERPL-MCNC: 0.6 MG/DL
BUN SERPL-MCNC: 5.6 MG/DL (ref 8.4–25.7)
CALCIUM SERPL-MCNC: 9 MG/DL (ref 8.8–10)
CHLORIDE SERPL-SCNC: 105 MMOL/L (ref 98–107)
CO2 SERPL-SCNC: 26 MMOL/L (ref 23–31)
CREAT SERPL-MCNC: 0.7 MG/DL (ref 0.73–1.18)
CREAT/UREA NIT SERPL: 8
EOSINOPHIL # BLD AUTO: 0.07 X10(3)/MCL (ref 0–0.9)
EOSINOPHIL NFR BLD AUTO: 0.7 %
ERYTHROCYTE [DISTWIDTH] IN BLOOD BY AUTOMATED COUNT: 18.8 % (ref 11.5–17)
GFR SERPLBLD CREATININE-BSD FMLA CKD-EPI: >60 ML/MIN/1.73/M2
GLOBULIN SER-MCNC: 4.3 GM/DL (ref 2.4–3.5)
GLUCOSE SERPL-MCNC: 79 MG/DL (ref 82–115)
HCT VFR BLD AUTO: 35 % (ref 42–52)
HGB BLD-MCNC: 11.4 G/DL (ref 14–18)
IMM GRANULOCYTES # BLD AUTO: 0.02 X10(3)/MCL (ref 0–0.04)
IMM GRANULOCYTES NFR BLD AUTO: 0.2 %
LYMPHOCYTES # BLD AUTO: 2.63 X10(3)/MCL (ref 0.6–4.6)
LYMPHOCYTES NFR BLD AUTO: 27.5 %
MCH RBC QN AUTO: 26.1 PG (ref 27–31)
MCHC RBC AUTO-ENTMCNC: 32.6 G/DL (ref 33–36)
MCV RBC AUTO: 80.1 FL (ref 80–94)
MONOCYTES # BLD AUTO: 0.83 X10(3)/MCL (ref 0.1–1.3)
MONOCYTES NFR BLD AUTO: 8.7 %
NEUTROPHILS # BLD AUTO: 6 X10(3)/MCL (ref 2.1–9.2)
NEUTROPHILS NFR BLD AUTO: 62.7 %
NRBC BLD AUTO-RTO: 0 %
PLATELET # BLD AUTO: 461 X10(3)/MCL (ref 130–400)
PMV BLD AUTO: 9.2 FL (ref 7.4–10.4)
POTASSIUM SERPL-SCNC: 4 MMOL/L (ref 3.5–5.1)
PROT SERPL-MCNC: 6.9 GM/DL (ref 5.8–7.6)
RBC # BLD AUTO: 4.37 X10(6)/MCL (ref 4.7–6.1)
SODIUM SERPL-SCNC: 141 MMOL/L (ref 136–145)
WBC # SPEC AUTO: 9.57 X10(3)/MCL (ref 4.5–11.5)

## 2024-06-02 PROCEDURE — 80053 COMPREHEN METABOLIC PANEL: CPT | Performed by: INTERNAL MEDICINE

## 2024-06-02 PROCEDURE — 25000003 PHARM REV CODE 250: Performed by: INTERNAL MEDICINE

## 2024-06-02 PROCEDURE — 21400001 HC TELEMETRY ROOM

## 2024-06-02 PROCEDURE — 36415 COLL VENOUS BLD VENIPUNCTURE: CPT | Performed by: INTERNAL MEDICINE

## 2024-06-02 PROCEDURE — 63600175 PHARM REV CODE 636 W HCPCS: Performed by: INTERNAL MEDICINE

## 2024-06-02 PROCEDURE — 85025 COMPLETE CBC W/AUTO DIFF WBC: CPT | Performed by: INTERNAL MEDICINE

## 2024-06-02 PROCEDURE — 25000003 PHARM REV CODE 250: Performed by: NURSE PRACTITIONER

## 2024-06-02 RX ORDER — HYDROXYZINE PAMOATE 50 MG/1
50 CAPSULE ORAL NIGHTLY PRN
Status: DISCONTINUED | OUTPATIENT
Start: 2024-06-02 | End: 2024-06-04 | Stop reason: HOSPADM

## 2024-06-02 RX ORDER — HYDRALAZINE HYDROCHLORIDE 20 MG/ML
10 INJECTION INTRAMUSCULAR; INTRAVENOUS EVERY 4 HOURS PRN
Status: DISCONTINUED | OUTPATIENT
Start: 2024-06-02 | End: 2024-06-04 | Stop reason: HOSPADM

## 2024-06-02 RX ADMIN — MEROPENEM 1 G: 1 INJECTION INTRAVENOUS at 09:06

## 2024-06-02 RX ADMIN — OXYBUTYNIN CHLORIDE 5 MG: 5 TABLET ORAL at 08:06

## 2024-06-02 RX ADMIN — OXYCODONE HYDROCHLORIDE AND ACETAMINOPHEN 1 TABLET: 10; 325 TABLET ORAL at 06:06

## 2024-06-02 RX ADMIN — OXYCODONE HYDROCHLORIDE AND ACETAMINOPHEN 1 TABLET: 10; 325 TABLET ORAL at 04:06

## 2024-06-02 RX ADMIN — OXYCODONE HYDROCHLORIDE AND ACETAMINOPHEN 1 TABLET: 10; 325 TABLET ORAL at 11:06

## 2024-06-02 RX ADMIN — FOLIC ACID 1 MG: 1 TABLET ORAL at 09:06

## 2024-06-02 RX ADMIN — FLUOXETINE HYDROCHLORIDE 20 MG: 20 CAPSULE ORAL at 09:06

## 2024-06-02 RX ADMIN — MEROPENEM 1 G: 1 INJECTION INTRAVENOUS at 12:06

## 2024-06-02 RX ADMIN — MUPIROCIN: 20 OINTMENT TOPICAL at 09:06

## 2024-06-02 RX ADMIN — ATORVASTATIN CALCIUM 20 MG: 10 TABLET, FILM COATED ORAL at 08:06

## 2024-06-02 RX ADMIN — DOXYCYCLINE HYCLATE 100 MG: 100 TABLET, COATED ORAL at 09:06

## 2024-06-02 RX ADMIN — GABAPENTIN 300 MG: 300 CAPSULE ORAL at 08:06

## 2024-06-02 RX ADMIN — RIVAROXABAN 20 MG: 10 TABLET, FILM COATED ORAL at 04:06

## 2024-06-02 RX ADMIN — MEROPENEM 1 G: 1 INJECTION INTRAVENOUS at 04:06

## 2024-06-02 RX ADMIN — FENOFIBRATE 48 MG: 48 TABLET, FILM COATED ORAL at 08:06

## 2024-06-02 RX ADMIN — FAMOTIDINE 20 MG: 20 TABLET, FILM COATED ORAL at 08:06

## 2024-06-02 RX ADMIN — METHOCARBAMOL 750 MG: 750 TABLET ORAL at 06:06

## 2024-06-02 RX ADMIN — GABAPENTIN 300 MG: 300 CAPSULE ORAL at 04:06

## 2024-06-02 RX ADMIN — ZOLPIDEM TARTRATE 10 MG: 5 TABLET ORAL at 08:06

## 2024-06-02 RX ADMIN — OXYCODONE HYDROCHLORIDE AND ACETAMINOPHEN 1 TABLET: 10; 325 TABLET ORAL at 08:06

## 2024-06-02 RX ADMIN — OXYCODONE HYDROCHLORIDE AND ACETAMINOPHEN 1 TABLET: 10; 325 TABLET ORAL at 12:06

## 2024-06-02 RX ADMIN — OXYBUTYNIN CHLORIDE 5 MG: 5 TABLET ORAL at 09:06

## 2024-06-02 RX ADMIN — TRAZODONE HYDROCHLORIDE 100 MG: 100 TABLET ORAL at 08:06

## 2024-06-02 RX ADMIN — DOXYCYCLINE HYCLATE 100 MG: 100 TABLET, COATED ORAL at 08:06
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
## 2024-06-02 NOTE — PROGRESS NOTES
Infectious Diseases Progress Note  58-year-old male with past medical history of T-spine cord injury with paraplegia, diabetes, HTN, hepatitis-C, chronic MRSA L ankle wound/osteomyelitis, was on suppressive doxycycline and R knee infection/septic arthritis and osteomyelitis with GBS/Enterococcus and Ecoli isolates who presented to ER on 5/30 with generalized weakness and decreased appetite. On admit he was afebrile with leukocytosis, WBC 11.37. U/A with >100 WBC, >100 RBC, few bacteria, 500 LE and 2+ nitrites. Urine culture >100k GNR. UDS (+) for opiates, amphetamines, MDMA, benzodiazepines. Blood cultures negative thus far. Influenza A/B Ag (-), RSV PCR (-) and SARS-CoV-2 PCR (-). CXR with no acute findings. ESR 66 and .5. MRSA PCR (-). Sacral wound with exposed bone, cultures pending.   He is currently on Merrem and oral Doxycycline    Subjective:  Lying in bed, no acute distress. No new complaints voiced. Low grade temps    Review of Systems   Constitutional:  Positive for fever and malaise/fatigue.   HENT: Negative.     Eyes: Negative.    Respiratory: Negative.     Cardiovascular: Negative.    Gastrointestinal: Negative.    Musculoskeletal: Negative.    Skin:         Non healing wounds   Neurological:  Positive for focal weakness and weakness.   All other systems reviewed and are negative.      Review of patient's allergies indicates:   Allergen Reactions    Baclofen Itching and Anxiety       Past Medical History:   Diagnosis Date    Arthritis     Chronic ulcer of ankle 05/26/2022    Frequent UTI 07/02/2019    Generalized anxiety disorder 05/26/2022    Neurogenic bladder 05/26/2022    Osteomyelitis 05/26/2022    Presence of suprapubic catheter 05/26/2022    Pure hypercholesterolemia 05/26/2022    Retention of urine, unspecified 08/09/2019    Spinal cord injury at T1-T6 level 04/20/2018       Past Surgical History:   Procedure Laterality Date    FRACTURE SURGERY  2021    INCISION AND DRAINAGE, LOWER  EXTREMITY Right 2023    Procedure: INCISION AND DRAINAGE, LOWER EXTREMITY;  Surgeon: Prabhu Shen DO;  Location: [facility] OR;  Service: Orthopedics;  Laterality: Right;  supine bone foam wash stuff cultures    INCISION AND DRAINAGE, LOWER EXTREMITY Right 2023    Procedure: INCISION AND DRAINAGE, LOWER EXTREMITY;  Surgeon: Prabhu Shen DO;  Location: [facility] OR;  Service: Orthopedics;  Laterality: Right;  supine any table bone foam wash stuff possible wound vac    INCISION AND DRAINAGE, LOWER EXTREMITY Right 2023    Procedure: INCISION AND DRAINAGE, LOWER EXTREMITY;  Surgeon: Prabhu Shen DO;  Location: [facility] OR;  Service: Orthopedics;  Laterality: Right;  supine bone foam vascular bed removal of distal femoral plate, wash stuff, possible AKA    INSERTION OF INTRAMEDULLARY MARISA Right 08/10/2022    Procedure: INSERTION, INTRAMEDULLARY MARISA RIGHT TIBIA;  Surgeon: Jorge Orellana MD;  Location: [facility] OR;  Service: Orthopedics;  Laterality: Right;    JOINT REPLACEMENT      Ankel    REMOVAL OF HARDWARE FROM LOWER EXTREMITY Right 2023    Procedure: REMOVAL, HARDWARE, LOWER EXTREMITY;  Surgeon: Prabhu hSen DO;  Location: [facility] OR;  Service: Orthopedics;  Laterality: Right;    SPINE SURGERY  2018       Social History     Socioeconomic History    Marital status:    Tobacco Use    Smoking status: Some Days     Current packs/day: 0.00     Average packs/day: 0.2 packs/day for 41.5 years (10.4 ttl pk-yrs)     Types: Cigars, Cigarettes     Start date: 1982     Last attempt to quit: 2023     Years since quittin.8    Smokeless tobacco: Never   Substance and Sexual Activity    Alcohol use: Not Currently     Alcohol/week: 2.0 standard drinks of alcohol     Types: 2 Cans of beer per week    Drug use: Not Currently     Types: Oxycodone    Sexual activity: Not Currently     Partners: Female     Birth control/protection: None     Social Determinants of Health     Financial Resource  Strain: Low Risk  (12/11/2023)    Overall Financial Resource Strain (CARDIA)     Difficulty of Paying Living Expenses: Not very hard   Food Insecurity: No Food Insecurity (12/11/2023)    Hunger Vital Sign     Worried About Running Out of Food in the Last Year: Never true     Ran Out of Food in the Last Year: Never true   Transportation Needs: No Transportation Needs (12/11/2023)    PRAPARE - Transportation     Lack of Transportation (Medical): No     Lack of Transportation (Non-Medical): No   Physical Activity: Inactive (12/11/2023)    Exercise Vital Sign     Days of Exercise per Week: 0 days     Minutes of Exercise per Session: 0 min   Stress: No Stress Concern Present (12/11/2023)    [employer] - Occupational Stress Questionnaire     Feeling of Stress : Only a little   Housing Stability: Low Risk  (12/11/2023)    Housing Stability Vital Sign     Unable to Pay for Housing in the Last Year: No     Number of Places Lived in the Last Year: 1     Unstable Housing in the Last Year: No         Scheduled Meds:   atorvastatin  20 mg Oral Nightly    doxycycline  100 mg Oral Q12H    ergocalciferol  50,000 Units Oral Q7 Days    famotidine  20 mg Oral BID    fenofibrate  48 mg Oral Daily    ferrous sulfate  1 tablet Oral Daily    FLUoxetine  20 mg Oral Daily    folic acid  1 mg Oral Daily    gabapentin  300 mg Oral TID    meropenem IV (PEDS and ADULTS)  1 g Intravenous Q8H    mupirocin   Nasal BID    oxybutynin  5 mg Oral BID    rivaroxaban  20 mg Oral Daily    traZODone  100 mg Oral QHS     Continuous Infusions:  PRN Meds:  Current Facility-Administered Medications:     0.9%  NaCl infusion (for blood administration), , Intravenous, Q24H PRN    acetaminophen, 650 mg, Oral, Q4H PRN    aluminum-magnesium hydroxide-simethicone, 30 mL, Oral, QID PRN    methocarbamoL, 750 mg, Oral, BID PRN    ondansetron, 4 mg, Intravenous, Q4H PRN    oxyCODONE-acetaminophen, 1 tablet, Oral, Q4H PRN    polyethylene  "glycol, 17 g, Oral, BID PRN    prochlorperazine, 5 mg, Intravenous, Q6H PRN    senna-docusate 8.6-50 mg, 2 tablet, Oral, BID PRN    sodium chloride 0.9%, 10 mL, Intravenous, PRN    zolpidem, 10 mg, Oral, Nightly PRN    Objective:  BP (!) 179/85   Pulse 82   Temp 100.2 °F (37.9 °C)   Resp 18   Ht 5' 10" (1.778 m)   Wt 71.2 kg (156 lb 15.5 oz)   SpO2 95%   BMI 22.52 kg/m²     Physical Exam:   Physical Exam  Vitals reviewed.   HENT:      Head: Normocephalic.   Cardiovascular:      Rate and Rhythm: Normal rate and regular rhythm.   Pulmonary:      Effort: Pulmonary effort is normal. No respiratory distress.      Breath sounds: Normal breath sounds. No wheezing.   Abdominal:      General: Bowel sounds are normal. There is no distension.      Palpations: Abdomen is soft.      Tenderness: There is no abdominal tenderness.   Musculoskeletal:      Cervical back: Normal range of motion.      Comments: Paraplegia   Skin:     Findings: No rash.      Comments: Wounds dressed   Neurological:      Mental Status: He is alert and oriented to person, place, and time.   Psychiatric:         Mood and Affect: Mood normal.         Behavior: Behavior normal.     Imaging      Lab Review   Recent Results (from the past 24 hour(s))   Comprehensive Metabolic Panel    Collection Time: 06/01/24  5:23 AM   Result Value Ref Range    Sodium 139 136 - 145 mmol/L    Potassium 3.9 3.5 - 5.1 mmol/L    Chloride 103 98 - 107 mmol/L    CO2 28 23 - 31 mmol/L    Glucose 111 82 - 115 mg/dL    Blood Urea Nitrogen 5.7 (L) 8.4 - 25.7 mg/dL    Creatinine 0.76 0.73 - 1.18 mg/dL    Calcium 8.3 (L) 8.8 - 10.0 mg/dL    Protein Total 5.8 5.8 - 7.6 gm/dL    Albumin 2.2 (L) 3.4 - 4.8 g/dL    Globulin 3.6 (H) 2.4 - 3.5 gm/dL    Albumin/Globulin Ratio 0.6 (L) 1.1 - 2.0 ratio    Bilirubin Total 0.5 <=1.5 mg/dL    ALP 75 40 - 150 unit/L    ALT 10 0 - 55 unit/L    AST 14 5 - 34 unit/L    eGFR >60 mL/min/1.73/m2    Anion Gap 8.0 mEq/L    BUN/Creatinine Ratio 8    CBC " with Differential    Collection Time: 06/01/24  5:23 AM   Result Value Ref Range    WBC 10.30 4.50 - 11.50 x10(3)/mcL    RBC 3.51 (L) 4.70 - 6.10 x10(6)/mcL    Hgb 9.2 (L) 14.0 - 18.0 g/dL    Hct 28.7 (L) 42.0 - 52.0 %    MCV 81.8 80.0 - 94.0 fL    MCH 26.2 (L) 27.0 - 31.0 pg    MCHC 32.1 (L) 33.0 - 36.0 g/dL    RDW 19.4 (H) 11.5 - 17.0 %    Platelet 391 130 - 400 x10(3)/mcL    MPV 9.8 7.4 - 10.4 fL    Neut % 64.7 %    Lymph % 24.2 %    Mono % 10.0 %    Eos % 0.6 %    Basophil % 0.2 %    Lymph # 2.49 0.6 - 4.6 x10(3)/mcL    Neut # 6.67 2.1 - 9.2 x10(3)/mcL    Mono # 1.03 0.1 - 1.3 x10(3)/mcL    Eos # 0.06 0 - 0.9 x10(3)/mcL    Baso # 0.02 <=0.2 x10(3)/mcL    IG# 0.03 0 - 0.04 x10(3)/mcL    IG% 0.3 %    NRBC% 0.0 %   Magnesium    Collection Time: 06/01/24  5:23 AM   Result Value Ref Range    Magnesium Level 1.50 (L) 1.60 - 2.60 mg/dL   POCT glucose    Collection Time: 06/01/24  5:47 AM   Result Value Ref Range    POCT Glucose 112 (H) 70 - 110 mg/dL       Assessment/Plan:  SIRS with leukocytosis  Gram (-) complicated UTI  Sacral wound infection with clinical osteomyelitis  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 Cultures pending, follow  -U/A abnormal with urine culture >100k GNR, follow  -Blood cultures negative thus far  -Discussed with patient and nursing

## 2024-06-03 LAB
ALBUMIN SERPL-MCNC: 2.4 G/DL (ref 3.4–4.8)
ALBUMIN/GLOB SERPL: 0.6 RATIO (ref 1.1–2)
ALP SERPL-CCNC: 77 UNIT/L (ref 40–150)
ALT SERPL-CCNC: 10 UNIT/L (ref 0–55)
ANION GAP SERPL CALC-SCNC: 8 MEQ/L
AST SERPL-CCNC: 15 UNIT/L (ref 5–34)
BACTERIA UR CULT: ABNORMAL
BACTERIA WND CULT: ABNORMAL
BASOPHILS # BLD AUTO: 0.03 X10(3)/MCL
BASOPHILS NFR BLD AUTO: 0.3 %
BILIRUB SERPL-MCNC: 0.5 MG/DL
BUN SERPL-MCNC: 6.2 MG/DL (ref 8.4–25.7)
CALCIUM SERPL-MCNC: 8.7 MG/DL (ref 8.8–10)
CHLORIDE SERPL-SCNC: 104 MMOL/L (ref 98–107)
CO2 SERPL-SCNC: 29 MMOL/L (ref 23–31)
CREAT SERPL-MCNC: 0.66 MG/DL (ref 0.73–1.18)
CREAT/UREA NIT SERPL: 9
EOSINOPHIL # BLD AUTO: 0.12 X10(3)/MCL (ref 0–0.9)
EOSINOPHIL NFR BLD AUTO: 1.3 %
ERYTHROCYTE [DISTWIDTH] IN BLOOD BY AUTOMATED COUNT: 18.6 % (ref 11.5–17)
GFR SERPLBLD CREATININE-BSD FMLA CKD-EPI: >60 ML/MIN/1.73/M2
GLOBULIN SER-MCNC: 4 GM/DL (ref 2.4–3.5)
GLUCOSE SERPL-MCNC: 89 MG/DL (ref 82–115)
GROUP & RH: NORMAL
HCT VFR BLD AUTO: 32.1 % (ref 42–52)
HGB BLD-MCNC: 10.1 G/DL (ref 14–18)
IMM GRANULOCYTES # BLD AUTO: 0.03 X10(3)/MCL (ref 0–0.04)
IMM GRANULOCYTES NFR BLD AUTO: 0.3 %
INDIRECT COOMBS: NORMAL
LYMPHOCYTES # BLD AUTO: 2.67 X10(3)/MCL (ref 0.6–4.6)
LYMPHOCYTES NFR BLD AUTO: 28.3 %
MAGNESIUM SERPL-MCNC: 1.5 MG/DL (ref 1.6–2.6)
MCH RBC QN AUTO: 26 PG (ref 27–31)
MCHC RBC AUTO-ENTMCNC: 31.5 G/DL (ref 33–36)
MCV RBC AUTO: 82.5 FL (ref 80–94)
MONOCYTES # BLD AUTO: 0.96 X10(3)/MCL (ref 0.1–1.3)
MONOCYTES NFR BLD AUTO: 10.2 %
NEUTROPHILS # BLD AUTO: 5.61 X10(3)/MCL (ref 2.1–9.2)
NEUTROPHILS NFR BLD AUTO: 59.6 %
NRBC BLD AUTO-RTO: 0 %
PLATELET # BLD AUTO: 484 X10(3)/MCL (ref 130–400)
PMV BLD AUTO: 9.4 FL (ref 7.4–10.4)
POTASSIUM SERPL-SCNC: 3.9 MMOL/L (ref 3.5–5.1)
PROT SERPL-MCNC: 6.4 GM/DL (ref 5.8–7.6)
RBC # BLD AUTO: 3.89 X10(6)/MCL (ref 4.7–6.1)
SODIUM SERPL-SCNC: 141 MMOL/L (ref 136–145)
SPECIMEN OUTDATE: NORMAL
WBC # SPEC AUTO: 9.42 X10(3)/MCL (ref 4.5–11.5)

## 2024-06-03 PROCEDURE — 21400001 HC TELEMETRY ROOM

## 2024-06-03 PROCEDURE — 86901 BLOOD TYPING SEROLOGIC RH(D): CPT | Performed by: INTERNAL MEDICINE

## 2024-06-03 PROCEDURE — 63600175 PHARM REV CODE 636 W HCPCS: Performed by: NURSE PRACTITIONER

## 2024-06-03 PROCEDURE — 27000207 HC ISOLATION

## 2024-06-03 PROCEDURE — 80053 COMPREHEN METABOLIC PANEL: CPT | Performed by: INTERNAL MEDICINE

## 2024-06-03 PROCEDURE — 63600175 PHARM REV CODE 636 W HCPCS: Performed by: INTERNAL MEDICINE

## 2024-06-03 PROCEDURE — 25000003 PHARM REV CODE 250: Performed by: INTERNAL MEDICINE

## 2024-06-03 PROCEDURE — 86900 BLOOD TYPING SEROLOGIC ABO: CPT | Performed by: INTERNAL MEDICINE

## 2024-06-03 PROCEDURE — 83735 ASSAY OF MAGNESIUM: CPT | Performed by: INTERNAL MEDICINE

## 2024-06-03 PROCEDURE — 25000003 PHARM REV CODE 250: Performed by: NURSE PRACTITIONER

## 2024-06-03 PROCEDURE — 85025 COMPLETE CBC W/AUTO DIFF WBC: CPT | Performed by: INTERNAL MEDICINE

## 2024-06-03 PROCEDURE — 36415 COLL VENOUS BLD VENIPUNCTURE: CPT | Performed by: INTERNAL MEDICINE

## 2024-06-03 RX ADMIN — ZOLPIDEM TARTRATE 10 MG: 5 TABLET ORAL at 10:06

## 2024-06-03 RX ADMIN — OXYCODONE HYDROCHLORIDE AND ACETAMINOPHEN 1 TABLET: 10; 325 TABLET ORAL at 08:06

## 2024-06-03 RX ADMIN — OXYCODONE HYDROCHLORIDE AND ACETAMINOPHEN 1 TABLET: 10; 325 TABLET ORAL at 06:06

## 2024-06-03 RX ADMIN — FAMOTIDINE 20 MG: 20 TABLET, FILM COATED ORAL at 08:06

## 2024-06-03 RX ADMIN — GABAPENTIN 300 MG: 300 CAPSULE ORAL at 09:06

## 2024-06-03 RX ADMIN — GABAPENTIN 300 MG: 300 CAPSULE ORAL at 08:06

## 2024-06-03 RX ADMIN — HYDRALAZINE HYDROCHLORIDE 10 MG: 20 INJECTION, SOLUTION INTRAMUSCULAR; INTRAVENOUS at 05:06

## 2024-06-03 RX ADMIN — OXYCODONE HYDROCHLORIDE AND ACETAMINOPHEN 1 TABLET: 10; 325 TABLET ORAL at 01:06

## 2024-06-03 RX ADMIN — FERROUS SULFATE TAB 325 MG (65 MG ELEMENTAL FE) 1 EACH: 325 (65 FE) TAB at 01:06

## 2024-06-03 RX ADMIN — METHOCARBAMOL 750 MG: 750 TABLET ORAL at 09:06

## 2024-06-03 RX ADMIN — OXYCODONE HYDROCHLORIDE AND ACETAMINOPHEN 1 TABLET: 10; 325 TABLET ORAL at 12:06

## 2024-06-03 RX ADMIN — FLUOXETINE HYDROCHLORIDE 20 MG: 20 CAPSULE ORAL at 08:06

## 2024-06-03 RX ADMIN — MEROPENEM 1 G: 1 INJECTION INTRAVENOUS at 08:06

## 2024-06-03 RX ADMIN — METHOCARBAMOL 750 MG: 750 TABLET ORAL at 08:06

## 2024-06-03 RX ADMIN — MEROPENEM 1 G: 1 INJECTION INTRAVENOUS at 05:06

## 2024-06-03 RX ADMIN — MUPIROCIN: 20 OINTMENT TOPICAL at 08:06

## 2024-06-03 RX ADMIN — DOXYCYCLINE HYCLATE 100 MG: 100 TABLET, COATED ORAL at 08:06

## 2024-06-03 RX ADMIN — GABAPENTIN 300 MG: 300 CAPSULE ORAL at 01:06

## 2024-06-03 RX ADMIN — ATORVASTATIN CALCIUM 20 MG: 10 TABLET, FILM COATED ORAL at 09:06

## 2024-06-03 RX ADMIN — FOLIC ACID 1 MG: 1 TABLET ORAL at 08:06

## 2024-06-03 RX ADMIN — MUPIROCIN: 20 OINTMENT TOPICAL at 09:06

## 2024-06-03 RX ADMIN — DOXYCYCLINE HYCLATE 100 MG: 100 TABLET, COATED ORAL at 09:06

## 2024-06-03 RX ADMIN — OXYBUTYNIN CHLORIDE 5 MG: 5 TABLET ORAL at 08:06

## 2024-06-03 RX ADMIN — FAMOTIDINE 20 MG: 20 TABLET, FILM COATED ORAL at 09:06

## 2024-06-03 RX ADMIN — OXYCODONE HYDROCHLORIDE AND ACETAMINOPHEN 1 TABLET: 10; 325 TABLET ORAL at 10:06

## 2024-06-03 RX ADMIN — OXYCODONE HYDROCHLORIDE AND ACETAMINOPHEN 1 TABLET: 10; 325 TABLET ORAL at 04:06

## 2024-06-03 RX ADMIN — FENOFIBRATE 48 MG: 48 TABLET, FILM COATED ORAL at 08:06

## 2024-06-03 RX ADMIN — MEROPENEM 1 G: 1 INJECTION INTRAVENOUS at 12:06

## 2024-06-03 RX ADMIN — TRAZODONE HYDROCHLORIDE 100 MG: 100 TABLET ORAL at 09:06

## 2024-06-03 RX ADMIN — OXYBUTYNIN CHLORIDE 5 MG: 5 TABLET ORAL at 09:06

## 2024-06-03 RX ADMIN — RIVAROXABAN 20 MG: 10 TABLET, FILM COATED ORAL at 05:06
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
## 2024-06-03 NOTE — PLAN OF CARE
06/03/24 1012   Discharge Reassessment   Assessment Type Discharge Planning Reassessment   Discharge Plan discussed with: Patient   Discharge Plan A Long-term acute care facility (LTAC)   Discharge Plan B Long-term acute care facility (LTAC)   Post-Acute Status   Post-Acute Authorization Placement   Post-Acute Placement Status Referrals Sent  ([facility])       Family and patient would like to try [facility] instead of [facility] now, referral sent via careport.

## 2024-06-03 NOTE — NURSING
Nurses Note -- 4 Eyes      6/3/2024   12:16 PM      Skin assessed during: Admit      [] No Altered Skin Integrity Present    []Prevention Measures Documented      [x] Yes- Altered Skin Integrity Present or Discovered   [x] LDA Added if Not in Epic (Describe Wound)   [x] New Altered Skin Integrity was Present on Admit and Documented in LDA   [x] Wound Image Taken    Wound Care Consulted? Yes    Attending Nurse:  Elodia Bernabe RN/Staff Member:     Praveena Judd

## 2024-06-04 VITALS
WEIGHT: 156.94 LBS | RESPIRATION RATE: 18 BRPM | HEART RATE: 62 BPM | HEIGHT: 70 IN | OXYGEN SATURATION: 100 % | SYSTOLIC BLOOD PRESSURE: 174 MMHG | BODY MASS INDEX: 22.47 KG/M2 | TEMPERATURE: 98 F | DIASTOLIC BLOOD PRESSURE: 84 MMHG

## 2024-06-04 PROBLEM — E44.0 MALNUTRITION OF MODERATE DEGREE: Status: ACTIVE | Noted: 2024-06-04

## 2024-06-04 LAB
ALBUMIN SERPL-MCNC: 2.2 G/DL (ref 3.4–4.8)
ALBUMIN/GLOB SERPL: 0.6 RATIO (ref 1.1–2)
ALP SERPL-CCNC: 72 UNIT/L (ref 40–150)
ALT SERPL-CCNC: 9 UNIT/L (ref 0–55)
ANION GAP SERPL CALC-SCNC: 7 MEQ/L
AST SERPL-CCNC: 19 UNIT/L (ref 5–34)
BACTERIA BLD CULT: NORMAL
BACTERIA BLD CULT: NORMAL
BASOPHILS # BLD AUTO: 0.04 X10(3)/MCL
BASOPHILS NFR BLD AUTO: 0.5 %
BILIRUB SERPL-MCNC: 0.3 MG/DL
BUN SERPL-MCNC: 8.1 MG/DL (ref 8.4–25.7)
CALCIUM SERPL-MCNC: 8.4 MG/DL (ref 8.8–10)
CHLORIDE SERPL-SCNC: 104 MMOL/L (ref 98–107)
CO2 SERPL-SCNC: 28 MMOL/L (ref 23–31)
CREAT SERPL-MCNC: 0.74 MG/DL (ref 0.73–1.18)
CREAT/UREA NIT SERPL: 11
EOSINOPHIL # BLD AUTO: 0.11 X10(3)/MCL (ref 0–0.9)
EOSINOPHIL NFR BLD AUTO: 1.3 %
ERYTHROCYTE [DISTWIDTH] IN BLOOD BY AUTOMATED COUNT: 18.6 % (ref 11.5–17)
GFR SERPLBLD CREATININE-BSD FMLA CKD-EPI: >60 ML/MIN/1.73/M2
GLOBULIN SER-MCNC: 4 GM/DL (ref 2.4–3.5)
GLUCOSE SERPL-MCNC: 120 MG/DL (ref 82–115)
HCT VFR BLD AUTO: 30.2 % (ref 42–52)
HGB BLD-MCNC: 9.4 G/DL (ref 14–18)
IMM GRANULOCYTES # BLD AUTO: 0.04 X10(3)/MCL (ref 0–0.04)
IMM GRANULOCYTES NFR BLD AUTO: 0.5 %
LYMPHOCYTES # BLD AUTO: 2.62 X10(3)/MCL (ref 0.6–4.6)
LYMPHOCYTES NFR BLD AUTO: 30.5 %
MCH RBC QN AUTO: 26 PG (ref 27–31)
MCHC RBC AUTO-ENTMCNC: 31.1 G/DL (ref 33–36)
MCV RBC AUTO: 83.7 FL (ref 80–94)
MONOCYTES # BLD AUTO: 0.96 X10(3)/MCL (ref 0.1–1.3)
MONOCYTES NFR BLD AUTO: 11.2 %
NEUTROPHILS # BLD AUTO: 4.82 X10(3)/MCL (ref 2.1–9.2)
NEUTROPHILS NFR BLD AUTO: 56 %
NRBC BLD AUTO-RTO: 0 %
PLATELET # BLD AUTO: 481 X10(3)/MCL (ref 130–400)
PMV BLD AUTO: 9.7 FL (ref 7.4–10.4)
POTASSIUM SERPL-SCNC: 4.2 MMOL/L (ref 3.5–5.1)
PROT SERPL-MCNC: 6.2 GM/DL (ref 5.8–7.6)
RBC # BLD AUTO: 3.61 X10(6)/MCL (ref 4.7–6.1)
SODIUM SERPL-SCNC: 139 MMOL/L (ref 136–145)
WBC # SPEC AUTO: 8.59 X10(3)/MCL (ref 4.5–11.5)

## 2024-06-04 PROCEDURE — 11045 DBRDMT SUBQ TISS EACH ADDL: CPT | Mod: ,,,

## 2024-06-04 PROCEDURE — 85025 COMPLETE CBC W/AUTO DIFF WBC: CPT | Performed by: INTERNAL MEDICINE

## 2024-06-04 PROCEDURE — 25000003 PHARM REV CODE 250: Performed by: INTERNAL MEDICINE

## 2024-06-04 PROCEDURE — 63600175 PHARM REV CODE 636 W HCPCS: Performed by: NURSE PRACTITIONER

## 2024-06-04 PROCEDURE — 25000003 PHARM REV CODE 250: Performed by: NURSE PRACTITIONER

## 2024-06-04 PROCEDURE — 11042 DBRDMT SUBQ TIS 1ST 20SQCM/<: CPT | Mod: ,,,

## 2024-06-04 PROCEDURE — 36415 COLL VENOUS BLD VENIPUNCTURE: CPT | Performed by: INTERNAL MEDICINE

## 2024-06-04 PROCEDURE — 63600175 PHARM REV CODE 636 W HCPCS: Performed by: INTERNAL MEDICINE

## 2024-06-04 PROCEDURE — 80053 COMPREHEN METABOLIC PANEL: CPT | Performed by: INTERNAL MEDICINE

## 2024-06-04 PROCEDURE — 99223 1ST HOSP IP/OBS HIGH 75: CPT | Mod: 25,ICN,,

## 2024-06-04 PROCEDURE — 0JB70ZZ EXCISION OF BACK SUBCUTANEOUS TISSUE AND FASCIA, OPEN APPROACH: ICD-10-PCS

## 2024-06-04 RX ORDER — AMLODIPINE BESYLATE 5 MG/1
10 TABLET ORAL DAILY
Status: DISCONTINUED | OUTPATIENT
Start: 2024-06-04 | End: 2024-06-04 | Stop reason: HOSPADM

## 2024-06-04 RX ORDER — CARVEDILOL 12.5 MG/1
25 TABLET ORAL 2 TIMES DAILY
Status: DISCONTINUED | OUTPATIENT
Start: 2024-06-04 | End: 2024-06-04 | Stop reason: HOSPADM

## 2024-06-04 RX ORDER — MAGNESIUM SULFATE HEPTAHYDRATE 40 MG/ML
2 INJECTION, SOLUTION INTRAVENOUS ONCE
Status: COMPLETED | OUTPATIENT
Start: 2024-06-04 | End: 2024-06-04

## 2024-06-04 RX ORDER — OXYBUTYNIN CHLORIDE 5 MG/1
5 TABLET ORAL 2 TIMES DAILY
Start: 2024-06-04 | End: 2025-06-04

## 2024-06-04 RX ORDER — TRAZODONE HYDROCHLORIDE 100 MG/1
100 TABLET ORAL NIGHTLY
Start: 2024-06-04 | End: 2025-06-04

## 2024-06-04 RX ORDER — HYDROXYZINE PAMOATE 50 MG/1
50 CAPSULE ORAL NIGHTLY PRN
Start: 2024-06-04

## 2024-06-04 RX ORDER — LANOLIN ALCOHOL/MO/W.PET/CERES
400 CREAM (GRAM) TOPICAL 2 TIMES DAILY
Start: 2024-06-04 | End: 2024-06-11

## 2024-06-04 RX ORDER — LANOLIN ALCOHOL/MO/W.PET/CERES
400 CREAM (GRAM) TOPICAL 2 TIMES DAILY
Status: DISCONTINUED | OUTPATIENT
Start: 2024-06-04 | End: 2024-06-04 | Stop reason: HOSPADM

## 2024-06-04 RX ORDER — HYDRALAZINE HYDROCHLORIDE 100 MG/1
100 TABLET, FILM COATED ORAL 2 TIMES DAILY
Start: 2024-06-04 | End: 2025-06-04

## 2024-06-04 RX ORDER — ERGOCALCIFEROL 1.25 MG/1
50000 CAPSULE ORAL
Start: 2024-06-08 | End: 2024-07-28

## 2024-06-04 RX ORDER — POLYETHYLENE GLYCOL 3350 17 G/17G
17 POWDER, FOR SOLUTION ORAL 2 TIMES DAILY PRN
Start: 2024-06-04

## 2024-06-04 RX ORDER — CLONIDINE HYDROCHLORIDE 0.1 MG/1
0.1 TABLET ORAL 3 TIMES DAILY
Status: DISCONTINUED | OUTPATIENT
Start: 2024-06-04 | End: 2024-06-04 | Stop reason: HOSPADM

## 2024-06-04 RX ORDER — FOLIC ACID 1 MG/1
1 TABLET ORAL DAILY
Start: 2024-06-05 | End: 2024-07-05

## 2024-06-04 RX ADMIN — FERROUS SULFATE TAB 325 MG (65 MG ELEMENTAL FE) 1 EACH: 325 (65 FE) TAB at 11:06

## 2024-06-04 RX ADMIN — GABAPENTIN 300 MG: 300 CAPSULE ORAL at 08:06

## 2024-06-04 RX ADMIN — OXYBUTYNIN CHLORIDE 5 MG: 5 TABLET ORAL at 08:06

## 2024-06-04 RX ADMIN — CARVEDILOL 25 MG: 12.5 TABLET, FILM COATED ORAL at 01:06

## 2024-06-04 RX ADMIN — MUPIROCIN: 20 OINTMENT TOPICAL at 09:06

## 2024-06-04 RX ADMIN — Medication 400 MG: at 01:06

## 2024-06-04 RX ADMIN — GABAPENTIN 300 MG: 300 CAPSULE ORAL at 02:06

## 2024-06-04 RX ADMIN — FENOFIBRATE 48 MG: 48 TABLET, FILM COATED ORAL at 08:06

## 2024-06-04 RX ADMIN — MEROPENEM 1 G: 1 INJECTION INTRAVENOUS at 01:06

## 2024-06-04 RX ADMIN — METHOCARBAMOL 750 MG: 750 TABLET ORAL at 01:06

## 2024-06-04 RX ADMIN — MEROPENEM 1 G: 1 INJECTION INTRAVENOUS at 11:06

## 2024-06-04 RX ADMIN — DOXYCYCLINE HYCLATE 100 MG: 100 TABLET, COATED ORAL at 08:06

## 2024-06-04 RX ADMIN — HYDRALAZINE HYDROCHLORIDE 10 MG: 20 INJECTION, SOLUTION INTRAMUSCULAR; INTRAVENOUS at 08:06

## 2024-06-04 RX ADMIN — RIVAROXABAN 20 MG: 10 TABLET, FILM COATED ORAL at 05:06

## 2024-06-04 RX ADMIN — OXYCODONE HYDROCHLORIDE AND ACETAMINOPHEN 1 TABLET: 10; 325 TABLET ORAL at 03:06

## 2024-06-04 RX ADMIN — OXYCODONE HYDROCHLORIDE AND ACETAMINOPHEN 1 TABLET: 10; 325 TABLET ORAL at 05:06

## 2024-06-04 RX ADMIN — MAGNESIUM SULFATE HEPTAHYDRATE 2 G: 40 INJECTION, SOLUTION INTRAVENOUS at 01:06

## 2024-06-04 RX ADMIN — CLONIDINE HYDROCHLORIDE 0.1 MG: 0.1 TABLET ORAL at 01:06

## 2024-06-04 RX ADMIN — AMLODIPINE BESYLATE 10 MG: 5 TABLET ORAL at 11:06

## 2024-06-04 RX ADMIN — CLONIDINE HYDROCHLORIDE 0.1 MG: 0.1 TABLET ORAL at 02:06

## 2024-06-04 RX ADMIN — FOLIC ACID 1 MG: 1 TABLET ORAL at 08:06

## 2024-06-04 RX ADMIN — OXYCODONE HYDROCHLORIDE AND ACETAMINOPHEN 1 TABLET: 10; 325 TABLET ORAL at 08:06

## 2024-06-04 RX ADMIN — FLUOXETINE HYDROCHLORIDE 20 MG: 20 CAPSULE ORAL at 08:06

## 2024-06-04 RX ADMIN — METHOCARBAMOL 750 MG: 750 TABLET ORAL at 08:06

## 2024-06-04 RX ADMIN — FAMOTIDINE 20 MG: 20 TABLET, FILM COATED ORAL at 08:06

## 2024-06-04 RX ADMIN — OXYCODONE HYDROCHLORIDE AND ACETAMINOPHEN 1 TABLET: 10; 325 TABLET ORAL at 01:06

## 2024-06-04 NOTE — NURSING
I was informed by pt's spouse that the pt came into the hospital with a wound vac that the pt had at home. The spouse told me that the wound vac is missing and thinks we had taken it. I checked the our floor, 8S, for the wound vac. I also called down to the ED to see if they have it and called security to see if it was in lost and found. The wound vac is no where to be found. I also called the patient advocate and left a voicemail about the situation. Will follow-up with patient advocate.   Spoke with wound care nurse who put on the hospital wound vac if it was on the pt when she came to see the pt and she said the pt had a wet-to-dry dressing on the wound.     Pt came in with [facility] wound vac, got contact number to get in touch with spouse if found;  made aware/has contact information, and spoke with patient advocacy.

## 2024-06-04 NOTE — SUBJECTIVE & OBJECTIVE
Scheduled Meds:   amLODIPine  10 mg Oral Daily    atorvastatin  20 mg Oral Nightly    carvediloL  25 mg Oral BID    cloNIDine  0.1 mg Oral TID    doxycycline  100 mg Oral Q12H    ergocalciferol  50,000 Units Oral Q7 Days    famotidine  20 mg Oral BID    fenofibrate  48 mg Oral Daily    ferrous sulfate  1 tablet Oral Daily    FLUoxetine  20 mg Oral Daily    folic acid  1 mg Oral Daily    gabapentin  300 mg Oral TID    magnesium oxide  400 mg Oral BID    magnesium sulfate IVPB  2 g Intravenous Once    meropenem IV (PEDS and ADULTS)  1 g Intravenous Q8H    mupirocin   Nasal BID    oxybutynin  5 mg Oral BID    rivaroxaban  20 mg Oral Daily    traZODone  100 mg Oral QHS     Continuous Infusions:  PRN Meds:  Current Facility-Administered Medications:     0.9%  NaCl infusion (for blood administration), , Intravenous, Q24H PRN    acetaminophen, 650 mg, Oral, Q4H PRN    aluminum-magnesium hydroxide-simethicone, 30 mL, Oral, QID PRN    hydrALAZINE, 10 mg, Intravenous, Q4H PRN    hydrOXYzine pamoate, 50 mg, Oral, Nightly PRN    methocarbamoL, 750 mg, Oral, BID PRN    ondansetron, 4 mg, Intravenous, Q4H PRN    oxyCODONE-acetaminophen, 1 tablet, Oral, Q4H PRN    polyethylene glycol, 17 g, Oral, BID PRN    prochlorperazine, 5 mg, Intravenous, Q6H PRN    senna-docusate 8.6-50 mg, 2 tablet, Oral, BID PRN    sodium chloride 0.9%, 10 mL, Intravenous, PRN    zolpidem, 10 mg, Oral, Nightly PRN    Review of patient's allergies indicates:   Allergen Reactions    Baclofen Itching and Anxiety        Past Medical History:   Diagnosis Date    Arthritis     Chronic ulcer of ankle 05/26/2022    Frequent UTI 07/02/2019    Generalized anxiety disorder 05/26/2022    Neurogenic bladder 05/26/2022    Osteomyelitis 05/26/2022    Presence of suprapubic catheter 05/26/2022    Pure hypercholesterolemia 05/26/2022    Retention of urine, unspecified 08/09/2019    Spinal cord injury at T1-T6 level 04/20/2018     Past Surgical History:   Procedure  Laterality Date    FRACTURE SURGERY      INCISION AND DRAINAGE, LOWER EXTREMITY Right 2023    Procedure: INCISION AND DRAINAGE, LOWER EXTREMITY;  Surgeon: Prabhu Shen DO;  Location: [facility] OR;  Service: Orthopedics;  Laterality: Right;  supine bone foam wash stuff cultures    INCISION AND DRAINAGE, LOWER EXTREMITY Right 2023    Procedure: INCISION AND DRAINAGE, LOWER EXTREMITY;  Surgeon: Prabhu Shen DO;  Location: [facility] OR;  Service: Orthopedics;  Laterality: Right;  supine any table bone foam wash stuff possible wound vac    INCISION AND DRAINAGE, LOWER EXTREMITY Right 2023    Procedure: INCISION AND DRAINAGE, LOWER EXTREMITY;  Surgeon: Prabhu Shen DO;  Location: [facility] OR;  Service: Orthopedics;  Laterality: Right;  supine bone foam vascular bed removal of distal femoral plate, wash stuff, possible AKA    INSERTION OF INTRAMEDULLARY MARISA Right 08/10/2022    Procedure: INSERTION, INTRAMEDULLARY MARISA RIGHT TIBIA;  Surgeon: Jorge Orellana MD;  Location: [facility] OR;  Service: Orthopedics;  Laterality: Right;    JOINT REPLACEMENT      Ankel    REMOVAL OF HARDWARE FROM LOWER EXTREMITY Right 2023    Procedure: REMOVAL, HARDWARE, LOWER EXTREMITY;  Surgeon: Prabhu Shen DO;  Location: [facility] OR;  Service: Orthopedics;  Laterality: Right;    SPINE SURGERY  2018       Family History       Problem Relation (Age of Onset)    No Known Problems Mother, Father          Tobacco Use    Smoking status: Some Days     Current packs/day: 0.00     Average packs/day: 0.2 packs/day for 41.5 years (10.4 ttl pk-yrs)     Types: Cigars, Cigarettes     Start date: 1982     Last attempt to quit: 2023     Years since quittin.8    Smokeless tobacco: Never   Substance and Sexual Activity    Alcohol use: Not Currently     Alcohol/week: 2.0 standard drinks of alcohol     Types: 2 Cans of beer per week    Drug use: Not Currently     Types: Oxycodone    Sexual activity: Not Currently     Partners:  Female     Birth control/protection: None     Review of Systems   Constitutional:  Positive for activity change, appetite change and fatigue. Negative for chills, diaphoresis and fever.   HENT: Negative.     Respiratory: Negative.     Skin:  Positive for wound.       Objective:     Vital Signs (Most Recent):  Temp: 98.3 °F (36.8 °C) (06/04/24 1318)  Pulse: 76 (06/04/24 1318)  Resp: 18 (06/04/24 1305)  BP: (!) 165/72 (06/04/24 1318)  SpO2: 97 % (06/04/24 1318) Vital Signs (24h Range):  Temp:  [97.6 °F (36.4 °C)-98.9 °F (37.2 °C)] 98.3 °F (36.8 °C)  Pulse:  [67-86] 76  Resp:  [16-18] 18  SpO2:  [95 %-98 %] 97 %  BP: (147-183)/(72-89) 165/72     Weight: 71.2 kg (156 lb 15.5 oz)  Body mass index is 22.52 kg/m².     Physical Exam  Vitals reviewed.   Constitutional:       General: He is not in acute distress.     Appearance: He is ill-appearing (chronic). He is not toxic-appearing.      Comments: Muscle wasting of BLE  Dressings dry and intact to BLE; left in place. BLE wounds not assessed at this time.    HENT:      Head: Normocephalic and atraumatic.      Nose: Nose normal.      Mouth/Throat:      Pharynx: Oropharynx is clear.   Cardiovascular:      Rate and Rhythm: Normal rate.   Pulmonary:      Effort: Pulmonary effort is normal. No respiratory distress.   Abdominal:      Comments: Suprapubic catheter;  bag     Skin:     General: Skin is warm and dry.      Capillary Refill: Capillary refill takes less than 2 seconds.             Comments: Stage 4 pressure ulcer to sacral region and right buttock. No purulent drainage or odor. Wound bed of sacral ulcer pink/red mixed with yellow slough and area of deep purple discoloration/bruising near proximal edge of wound. Right buttock wound with red wound bed.     No signs of infection.    Neurological:      Mental Status: He is alert, oriented to person, place, and time and easily aroused.      Motor: Weakness and atrophy present.      Comments: Paraplegia    Psychiatric:          Behavior: Behavior is slowed. Behavior is cooperative.      Comments: Slow speech       Sacrum: 6 x 7 x 1.6 cm       Sacrum: post debridement           Laboratory:  A1C:   Recent Labs   Lab 05/09/24  1335   HGBA1C 5.9     BMP:   Recent Labs   Lab 06/03/24  0606 06/04/24  0558    139   K 3.9 4.2    104   CO2 29 28   BUN 6.2* 8.1*   CREATININE 0.66* 0.74   CALCIUM 8.7* 8.4*   MG 1.50*  --        CBC:   Recent Labs   Lab 06/04/24  0558   WBC 8.59   RBC 3.61*   HGB 9.4*   HCT 30.2*   *   MCV 83.7   MCH 26.0*   MCHC 31.1*     CMP:   Recent Labs   Lab 06/04/24  0558   CALCIUM 8.4*   ALBUMIN 2.2*      K 4.2   CO2 28      BUN 8.1*   CREATININE 0.74   ALKPHOS 72   ALT 9   AST 19   BILITOT 0.3     CRP:   Recent Labs   Lab 05/31/24  0351   .50*     ESR:   Recent Labs   Lab 05/31/24  0351   SEDRATE 66*     LFTs:   Recent Labs   Lab 06/04/24  0558   ALT 9   AST 19   ALKPHOS 72   BILITOT 0.3   ALBUMIN 2.2*     Microbiology Results (last 7 days)       Procedure Component Value Units Date/Time    Blood culture x two cultures. Draw prior to antibiotics. [[id number]]  (Normal) Collected: 05/30/24 1528    Order Status: Completed Specimen: Blood Updated: 06/03/24 1800     Blood Culture No Growth At 96 Hours    Blood culture x two cultures. Draw prior to antibiotics. [[id number]]  (Normal) Collected: 05/30/24 1528    Order Status: Completed Specimen: Blood Updated: 06/03/24 1800     Blood Culture No Growth At 96 Hours    Wound Culture [[id number]]  (Abnormal)  (Susceptibility) Collected: 05/31/24 0310    Order Status: Completed Specimen: Wound from Buttocks Updated: 06/03/24 0929     Wound Culture Many Escherichia coli ESBL    Urine culture [[id number]]  (Abnormal)  (Susceptibility) Collected: 05/30/24 1426    Order Status: Completed Specimen: Urine Updated: 06/03/24 0916     Urine Culture >/= 100,000 colonies/ml Escherichia coli ESBL    Wound Culture [[id number]] Collected: 05/31/24 0001     Order Status: Canceled Specimen: Wound from Buttocks Updated: 05/31/24 0012              Recent Labs   Lab 05/30/24  1426   COLORU Yellow   PHUR 5.5   PROTEINUA 1+*   BACTERIA Few*   NITRITE 2+*   LEUKOCYTESUR 500*   UROBILINOGEN Normal   HYALINECASTS 3-5*       Diagnostic Results:  I have reviewed all pertinent imaging results/findings within the past 24 hours.

## 2024-06-04 NOTE — NURSING
Report given @ 8430 to [place] at [facility]. Pt taken out of will-call; given ETA of 1900.

## 2024-06-04 NOTE — PROCEDURES
"Bianca Khan is a 59 y.o. male patient.    Temp: 98.3 °F (36.8 °C) (06/04/24 1318)  Pulse: 76 (06/04/24 1318)  Resp: 18 (06/04/24 1305)  BP: (!) 165/72 (06/04/24 1318)  SpO2: 97 % (06/04/24 1318)  Weight: 71.2 kg (156 lb 15.5 oz) (05/31/24 0748)  Height: 5' 10" (177.8 cm) (05/31/24 1532)       Debridement    Date/Time: 6/4/2024 2:10 PM    Performed by: Ada Bull FNP  Authorized by: Ada Bull FNP    Consent Done?:  Yes (Written)  Local anesthesia used?: Yes    Local anesthetic:  Topical anesthetic    Type of Debridement:  Excisional       Length (cm):  6       Area (sq cm):  42       Width (cm):  7       Percent Debrided (%):  25       Depth (cm):  1.6       Total Area Debrided (sq cm):  10.5    Depth of debridement:  Subcutaneous tissue    Tissue debrided:  Dermis, Epidermis and Subcutaneous    Devitalized tissue debrided:  Slough and Necrotic/Eschar    Instruments:  Scissors and Blade  Bleeding:  None  Patient tolerance:  Patient tolerated the procedure well with no immediate complications  Debridement    Date/Time: 6/4/2024 12:20 PM    Performed by: Ada Bull FNP  Authorized by: Ada Bull FNP  Associated wounds:        Altered Skin Integrity 01/09/24 0848 upper Sacral spine  Time out: Immediately prior to procedure a "time out" was called to verify the correct patient, procedure, equipment, support staff and site/side marked as required.    Consent Done?:  Yes (Verbal)  Local anesthesia used?: No      Wound Details:    Location:  Sacrum    Type of Debridement:  Excisional       Length (cm):  6       Area (sq cm):  42       Width (cm):  7       Percent Debrided (%):  25       Depth (cm):  1.6       Total Area Debrided (sq cm):  10.5    Depth of debridement:  Subcutaneous tissue    Tissue debrided:  Dermis, Epidermis and Subcutaneous    Devitalized tissue debrided:  Slough and Necrotic/Eschar    Instruments:  Scissors and Blade  Bleeding:  None  Patient tolerance:  Patient " tolerated the procedure well with no immediate complications      6/4/2024

## 2024-06-04 NOTE — PLAN OF CARE
Discharge information sent to [facility]. Bed should be available this afternoon once another patient discharges from [facility] for surgery. Provided Maya with number to floor for report once bed is available.

## 2024-06-04 NOTE — PROGRESS NOTES
[facility] [address] Surg  Wound Care    Patient Name:  Bianca Khan   MRN:  [id number]  Date: 2024  Diagnosis: Sacral wound    History:     Past Medical History:   Diagnosis Date    Arthritis     Chronic ulcer of ankle 2022    Frequent UTI 2019    Generalized anxiety disorder 2022    Neurogenic bladder 2022    Osteomyelitis 2022    Presence of suprapubic catheter 2022    Pure hypercholesterolemia 2022    Retention of urine, unspecified 2019    Spinal cord injury at T1-T6 level 2018       Social History     Socioeconomic History    Marital status:    Tobacco Use    Smoking status: Some Days     Current packs/day: 0.00     Average packs/day: 0.2 packs/day for 41.5 years (10.4 ttl pk-yrs)     Types: Cigars, Cigarettes     Start date: 1982     Last attempt to quit: 2023     Years since quittin.8    Smokeless tobacco: Never   Substance and Sexual Activity    Alcohol use: Not Currently     Alcohol/week: 2.0 standard drinks of alcohol     Types: 2 Cans of beer per week    Drug use: Not Currently     Types: Oxycodone    Sexual activity: Not Currently     Partners: Female     Birth control/protection: None     Social Determinants of Health     Financial Resource Strain: Low Risk  (2023)    Overall Financial Resource Strain (CARDIA)     Difficulty of Paying Living Expenses: Not very hard   Food Insecurity: No Food Insecurity (2023)    Hunger Vital Sign     Worried About Running Out of Food in the Last Year: Never true     Ran Out of Food in the Last Year: Never true   Transportation Needs: No Transportation Needs (2023)    PRAPARE - Transportation     Lack of Transportation (Medical): No     Lack of Transportation (Non-Medical): No   Physical Activity: Inactive (2023)    Exercise Vital Sign     Days of Exercise per Week: 0 days     Minutes of Exercise per Session: 0 min   Stress: No Stress Concern Present  (12/11/2023)    [employer] - Occupational Stress Questionnaire     Feeling of Stress : Only a little   Housing Stability: Low Risk  (12/11/2023)    Housing Stability Vital Sign     Unable to Pay for Housing in the Last Year: No     Number of Places Lived in the Last Year: 1     Unstable Housing in the Last Year: No       Precautions:     Allergies as of 05/30/2024 - Reviewed 05/30/2024   Allergen Reaction Noted    Baclofen Itching and Anxiety 06/17/2019       WOC Assessment Details/Treatment        06/04/24 1153   WOCN Assessment   Visit Date 06/04/24   Visit Time 1153   Consult Type Follow Up   WOCN Speciality Wound   WOCN List wound vac   Wound pressure   Procedure wound vac   Intervention assessed   Teaching on-going   Skin Interventions   Device Skin Pressure Protection absorbent pad utilized/changed   Pressure Reduction Devices specialty bed utilized        Altered Skin Integrity 01/09/24 0848 upper Sacral spine   Date First Assessed/Time First Assessed: 01/09/24 0848   Altered Skin Integrity Present on Admission - Did Patient arrive to the hospital with altered skin?: suspected hospital acquired  Orientation: upper  Location: Sacral spine   Wound Image    Description of Altered Skin Integrity Full thickness tissue loss with exposed bone, tendon, or muscle. Often includes undermining and tunneling. May extend into muscle and/or supporting structures.   Dressing Appearance Dry;Intact;Clean   Drainage Amount Small   Drainage Characteristics/Odor Serosanguineous   Appearance Red   Tissue loss description Full thickness   Red (%), Wound Tissue Color 75 %   Yellow (%), Wound Tissue Color 25 %   Periwound Area Scar tissue   Wound Edges Defined   Wound Length (cm) 6 cm   Wound Width (cm) 6 cm   Wound Depth (cm) 1.6 cm   Wound Volume (cm^3) 57.6 cm^3   Wound Surface Area (cm^2) 36 cm^2   Care Cleansed with:;Sterile normal saline   Dressing Applied        Negative Pressure Wound Therapy   05/31/24   Placement Date: 05/31/24   Location: Sacral Spine   NPWT Type Vacuum Therapy   Therapy Setting NPWT Continuous therapy   Pressure Setting NPWT 125 mmHg   Sponges Inserted NPWT   (wet to dry applied for transport)   Sponges Removed NPWT Black     Wound care follow up for wound vac change to sacrum. Wife at bedside. Ada Bull FNP at bedside for evaluation see progress note. Wound vac dressing removed. Wet to dry dressing applied for transport to [facility], and they will apply wound vac there. Nursing to continue with preventative measures. Will follow up.     06/04/2024
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
## 2024-06-05 NOTE — NURSING
Patient discharged via [facility] via stretcher with wife at bedside in stable condition-denies pain/discomfort at present-belongings in tow

## 2024-06-08 NOTE — CONSULTS
Infectious Diseases Consultation    Inpatient consult to Infectious diseases  Consult performed by: Magdi Long NP  Consult ordered by: Shruthi Randolph MD   Reason for consult: his patient, infected sacral wound     HPI:   58-year-old male with past medical history of T-spine cord injury with paraplegia, diabetes, HTN, hepatitis-C, chronic MRSA L ankle wound/osteomyelitis, was on suppressive doxycycline and R knee infection/septic arthritis and osteomyelitis with GBS/Enterococcus and Ecoli isolates who presented to ER on 5/30 with generalized weakness and decreased appetite. On admit he was afebrile with leukocytosis, WBC 11.37. U/A with >100 WBC, >100 RBC, few bacteria, 500 LE and 2+ nitrites. Urine culture >100k GNR. UDS (+) for opiates, amphetamines, MDMA, benzodiazepines. Blood cultures negative thus far. Influenza A/B Ag (-), RSV PCR (-) and SARS-CoV-2 PCR (-). CXR with no acute findings. ESR 66 and .5. MRSA PCR (-). Sacral wound with exposed bone, cultures pending.   He is currently on Merrem and oral Doxycycline    Past Medical and Surgical History  Allergies :   Baclofen    Medical :   He has a past medical history of Arthritis, Chronic ulcer of ankle (05/26/2022), Frequent UTI (07/02/2019), Generalized anxiety disorder (05/26/2022), Neurogenic bladder (05/26/2022), Osteomyelitis (05/26/2022), Presence of suprapubic catheter (05/26/2022), Pure hypercholesterolemia (05/26/2022), Retention of urine, unspecified (08/09/2019), and Spinal cord injury at T1-T6 level (04/20/2018).    Surgical :   He has a past surgical history that includes Insertion of intramedullary raven (Right, 08/10/2022); incision and drainage, lower extremity (Right, 06/29/2023); Fracture surgery (2021); Spine surgery (March 1st 2018); Joint replacement (2021); incision and drainage, lower extremity (Right, 11/30/2023); incision and drainage, lower extremity (Right, 12/1/2023); and Removal of hardware from lower extremity  "(Right, 12/1/2023).     Family History  His family history includes No Known Problems in his father and mother.    Social History  He reports that he has been smoking cigars and cigarettes. He started smoking about 42 years ago. He has a 10.4 pack-year smoking history. He has never used smokeless tobacco. He reports that he does not currently use alcohol after a past usage of about 2.0 standard drinks of alcohol per week. He reports that he does not currently use drugs after having used the following drugs: Oxycodone.     ROS  Constitutional:  Positive for fever and malaise/fatigue.   HENT: Negative.     Eyes: Negative.    Respiratory: Negative.     Cardiovascular: Negative.    Gastrointestinal: Negative.    Musculoskeletal: Negative.    Skin:         Non healing wounds   Neurological:  Positive for focal weakness and weakness.   All other systems reviewed and are negative.    Objective   Physical Exam  Vitals reviewed.   HENT:      Head: Normocephalic.   Cardiovascular:      Rate and Rhythm: Normal rate and regular rhythm.   Pulmonary:      Effort: Pulmonary effort is normal. No respiratory distress.      Breath sounds: Normal breath sounds. No wheezing.   Abdominal:      General: Bowel sounds are normal. There is no distension.      Palpations: Abdomen is soft.      Tenderness: There is no abdominal tenderness.   Musculoskeletal:      Cervical back: Normal range of motion.      Comments: Paraplegia   Skin:     Findings: No rash.      Comments: Wounds dressed   Neurological:      Mental Status: He is alert and oriented to person, place, and time.   Psychiatric:         Mood and Affect: Mood normal.         Behavior: Behavior normal.     VITAL SIGNS: 24 HR MIN & MAX LAST    No data recorded  97.8 °F (36.6 °C)        No data recorded  (!) 174/84     No data recorded  62     No data recorded  18    No data recorded  100 %      HT: 5' 10" (177.8 cm)  WT: 71.2 kg (156 lb 15.5 oz)  BMI: 22.5     No results found for this " or any previous visit (from the past 24 hour(s)).    Imaging  5/30/24 CXR  Impression:   No acute findings.     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 This is a 60 y/o male with 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 on chronic suppression with Doxycycline who presented to ER on 5/30 with generalized weakness and decreased appetite. On admit he was afebrile with leukocytosis. U/A abnormal with urine culture >100k GNR. Blood cultures pending. He was noted to have a sacral wound with clinical osteomyelitis. Sacral wound cultures pending. We will continue Merrem and oral Doxycycline for now. Follow cultures with plan to adjust antibiotics as needed. Continue wound care. Discussed with patient, wife and nursing. Thank you for this consultation, we will continue to follow Mr Khan with you.

## 2024-08-14 ENCOUNTER — LAB REQUISITION (OUTPATIENT)
Dept: LAB | Facility: HOSPITAL | Age: 59
End: 2024-08-14
Payer: MEDICARE

## 2024-08-14 DIAGNOSIS — G82.21 PARAPLEGIA, COMPLETE: ICD-10-CM

## 2024-08-14 DIAGNOSIS — N18.2 CHRONIC KIDNEY DISEASE, STAGE 2 (MILD): ICD-10-CM

## 2024-08-14 DIAGNOSIS — I12.9 HYPERTENSIVE CHRONIC KIDNEY DISEASE WITH STAGE 1 THROUGH STAGE 4 CHRONIC KIDNEY DISEASE, OR UNSPECIFIED CHRONIC KIDNEY DISEASE: ICD-10-CM

## 2024-08-14 DIAGNOSIS — D50.9 IRON DEFICIENCY ANEMIA, UNSPECIFIED: ICD-10-CM

## 2024-08-14 DIAGNOSIS — E11.22 TYPE 2 DIABETES MELLITUS WITH DIABETIC CHRONIC KIDNEY DISEASE: ICD-10-CM

## 2024-08-14 DIAGNOSIS — I25.10 ATHEROSCLEROTIC HEART DISEASE OF NATIVE CORONARY ARTERY WITHOUT ANGINA PECTORIS: ICD-10-CM

## 2024-08-14 DIAGNOSIS — E78.5 HYPERLIPIDEMIA, UNSPECIFIED: ICD-10-CM

## 2024-08-14 LAB
ALBUMIN SERPL-MCNC: 2.6 G/DL (ref 3.4–4.8)
ALBUMIN/GLOB SERPL: 0.7 RATIO (ref 1.1–2)
ALP SERPL-CCNC: 65 UNIT/L (ref 40–150)
ALT SERPL-CCNC: 6 UNIT/L (ref 0–55)
ANION GAP SERPL CALC-SCNC: 7 MEQ/L
AST SERPL-CCNC: 11 UNIT/L (ref 5–34)
BASOPHILS # BLD AUTO: 0.03 X10(3)/MCL
BASOPHILS NFR BLD AUTO: 0.3 %
BILIRUB SERPL-MCNC: 0.1 MG/DL
BUN SERPL-MCNC: 18.8 MG/DL (ref 8.4–25.7)
CALCIUM SERPL-MCNC: 8.6 MG/DL (ref 8.8–10)
CHLORIDE SERPL-SCNC: 109 MMOL/L (ref 98–107)
CO2 SERPL-SCNC: 24 MMOL/L (ref 23–31)
CREAT SERPL-MCNC: 0.99 MG/DL (ref 0.73–1.18)
CREAT/UREA NIT SERPL: 19
CRP SERPL-MCNC: 22.1 MG/L
EOSINOPHIL # BLD AUTO: 0.19 X10(3)/MCL (ref 0–0.9)
EOSINOPHIL NFR BLD AUTO: 2 %
ERYTHROCYTE [DISTWIDTH] IN BLOOD BY AUTOMATED COUNT: 18.1 % (ref 11.5–17)
ERYTHROCYTE [SEDIMENTATION RATE] IN BLOOD: 50 MM/HR (ref 0–15)
GFR SERPLBLD CREATININE-BSD FMLA CKD-EPI: >60 ML/MIN/1.73/M2
GLOBULIN SER-MCNC: 3.7 GM/DL (ref 2.4–3.5)
GLUCOSE SERPL-MCNC: 81 MG/DL (ref 82–115)
HCT VFR BLD AUTO: 21.1 % (ref 42–52)
HGB BLD-MCNC: 6.1 G/DL (ref 14–18)
IMM GRANULOCYTES # BLD AUTO: 0.1 X10(3)/MCL (ref 0–0.04)
IMM GRANULOCYTES NFR BLD AUTO: 1.1 %
LYMPHOCYTES # BLD AUTO: 2.57 X10(3)/MCL (ref 0.6–4.6)
LYMPHOCYTES NFR BLD AUTO: 27 %
MCH RBC QN AUTO: 24.2 PG (ref 27–31)
MCHC RBC AUTO-ENTMCNC: 28.9 G/DL (ref 33–36)
MCV RBC AUTO: 83.7 FL (ref 80–94)
MONOCYTES # BLD AUTO: 0.69 X10(3)/MCL (ref 0.1–1.3)
MONOCYTES NFR BLD AUTO: 7.2 %
NEUTROPHILS # BLD AUTO: 5.94 X10(3)/MCL (ref 2.1–9.2)
NEUTROPHILS NFR BLD AUTO: 62.4 %
NRBC BLD AUTO-RTO: 0.6 %
PLATELET # BLD AUTO: 640 X10(3)/MCL (ref 130–400)
PLATELETS.RETICULATED NFR BLD AUTO: 1.3 % (ref 0.9–11.2)
PMV BLD AUTO: 8.9 FL (ref 7.4–10.4)
POTASSIUM SERPL-SCNC: 5 MMOL/L (ref 3.5–5.1)
PROT SERPL-MCNC: 6.3 GM/DL (ref 5.8–7.6)
RBC # BLD AUTO: 2.52 X10(6)/MCL (ref 4.7–6.1)
SODIUM SERPL-SCNC: 140 MMOL/L (ref 136–145)
WBC # BLD AUTO: 9.52 X10(3)/MCL (ref 4.5–11.5)

## 2024-08-14 PROCEDURE — 85652 RBC SED RATE AUTOMATED: CPT | Performed by: INTERNAL MEDICINE

## 2024-08-14 PROCEDURE — 86140 C-REACTIVE PROTEIN: CPT | Performed by: INTERNAL MEDICINE

## 2024-08-14 PROCEDURE — 85025 COMPLETE CBC W/AUTO DIFF WBC: CPT | Performed by: INTERNAL MEDICINE

## 2024-08-14 PROCEDURE — 80053 COMPREHEN METABOLIC PANEL: CPT | Performed by: INTERNAL MEDICINE

## 2024-08-15 ENCOUNTER — DOCUMENTATION ONLY (OUTPATIENT)
Dept: FAMILY MEDICINE | Facility: CLINIC | Age: 59
End: 2024-08-15
Payer: MEDICARE

## 2024-08-20 ENCOUNTER — HOSPITAL ENCOUNTER (INPATIENT)
Facility: HOSPITAL | Age: 59
LOS: 10 days | Discharge: NURSING, REHAB OR LONG-TERM CARE | DRG: 698 | End: 2024-08-30
Attending: EMERGENCY MEDICINE | Admitting: INTERNAL MEDICINE
Payer: MEDICARE

## 2024-08-20 DIAGNOSIS — D64.9 ACUTE ON CHRONIC ANEMIA: ICD-10-CM

## 2024-08-20 DIAGNOSIS — A41.9 ACUTE ONSET SEPSIS: ICD-10-CM

## 2024-08-20 DIAGNOSIS — R19.5 FECAL OCCULT BLOOD TEST POSITIVE: ICD-10-CM

## 2024-08-20 DIAGNOSIS — A49.9 ESBL (EXTENDED SPECTRUM BETA-LACTAMASE) PRODUCING BACTERIA INFECTION: Primary | ICD-10-CM

## 2024-08-20 DIAGNOSIS — N39.0 COMPLICATED UTI (URINARY TRACT INFECTION): ICD-10-CM

## 2024-08-20 DIAGNOSIS — D64.9 SYMPTOMATIC ANEMIA: ICD-10-CM

## 2024-08-20 DIAGNOSIS — R09.89 PULMONARY CONGESTION: ICD-10-CM

## 2024-08-20 DIAGNOSIS — D63.1 ANEMIA DUE TO STAGE 3A CHRONIC KIDNEY DISEASE: ICD-10-CM

## 2024-08-20 DIAGNOSIS — N18.31 ANEMIA DUE TO STAGE 3A CHRONIC KIDNEY DISEASE: ICD-10-CM

## 2024-08-20 DIAGNOSIS — Z16.12 ESBL (EXTENDED SPECTRUM BETA-LACTAMASE) PRODUCING BACTERIA INFECTION: Primary | ICD-10-CM

## 2024-08-20 LAB
ABO + RH BLD: NORMAL
ABO + RH BLD: NORMAL
ALBUMIN SERPL-MCNC: 2.5 G/DL (ref 3.4–4.8)
ALBUMIN/GLOB SERPL: 0.5 RATIO (ref 1.1–2)
ALP SERPL-CCNC: 71 UNIT/L (ref 40–150)
ALT SERPL-CCNC: 8 UNIT/L (ref 0–55)
ANION GAP SERPL CALC-SCNC: 8 MEQ/L
AST SERPL-CCNC: 23 UNIT/L (ref 5–34)
BACTERIA #/AREA URNS AUTO: ABNORMAL /HPF
BASOPHILS # BLD AUTO: 0.02 X10(3)/MCL
BASOPHILS NFR BLD AUTO: 0.1 %
BILIRUB SERPL-MCNC: 0.4 MG/DL
BILIRUB UR QL STRIP.AUTO: NEGATIVE
BLD PROD TYP BPU: NORMAL
BLD PROD TYP BPU: NORMAL
BLOOD UNIT EXPIRATION DATE: NORMAL
BLOOD UNIT EXPIRATION DATE: NORMAL
BLOOD UNIT TYPE CODE: 5100
BLOOD UNIT TYPE CODE: 5100
BUN SERPL-MCNC: 17.3 MG/DL (ref 8.4–25.7)
CALCIUM SERPL-MCNC: 8.9 MG/DL (ref 8.8–10)
CHLORIDE SERPL-SCNC: 103 MMOL/L (ref 98–107)
CLARITY UR: ABNORMAL
CO2 SERPL-SCNC: 24 MMOL/L (ref 23–31)
COLOR UR AUTO: YELLOW
CREAT SERPL-MCNC: 1.46 MG/DL (ref 0.73–1.18)
CREAT/UREA NIT SERPL: 12
CROSSMATCH INTERPRETATION: NORMAL
CROSSMATCH INTERPRETATION: NORMAL
DISPENSE STATUS: NORMAL
DISPENSE STATUS: NORMAL
EOSINOPHIL # BLD AUTO: 0 X10(3)/MCL (ref 0–0.9)
EOSINOPHIL NFR BLD AUTO: 0 %
ERYTHROCYTE [DISTWIDTH] IN BLOOD BY AUTOMATED COUNT: 18.9 % (ref 11.5–17)
FLUAV AG UPPER RESP QL IA.RAPID: NOT DETECTED
FLUBV AG UPPER RESP QL IA.RAPID: NOT DETECTED
GFR SERPLBLD CREATININE-BSD FMLA CKD-EPI: 55 ML/MIN/1.73/M2
GLOBULIN SER-MCNC: 4.7 GM/DL (ref 2.4–3.5)
GLUCOSE SERPL-MCNC: 105 MG/DL (ref 82–115)
GLUCOSE UR QL STRIP: NORMAL
GROUP & RH: NORMAL
HCT VFR BLD AUTO: 20.3 % (ref 42–52)
HGB BLD-MCNC: 6.2 G/DL (ref 14–18)
HGB UR QL STRIP: ABNORMAL
IMM GRANULOCYTES # BLD AUTO: 0.12 X10(3)/MCL (ref 0–0.04)
IMM GRANULOCYTES NFR BLD AUTO: 0.7 %
INDIRECT COOMBS: NORMAL
KETONES UR QL STRIP: NEGATIVE
LACTATE SERPL-SCNC: 1.1 MMOL/L (ref 0.5–2.2)
LEUKOCYTE ESTERASE UR QL STRIP: 500
LYMPHOCYTES # BLD AUTO: 1.1 X10(3)/MCL (ref 0.6–4.6)
LYMPHOCYTES NFR BLD AUTO: 6.4 %
MAGNESIUM SERPL-MCNC: 2 MG/DL (ref 1.6–2.6)
MCH RBC QN AUTO: 24.1 PG (ref 27–31)
MCHC RBC AUTO-ENTMCNC: 30.5 G/DL (ref 33–36)
MCV RBC AUTO: 79 FL (ref 80–94)
MONOCYTES # BLD AUTO: 0.92 X10(3)/MCL (ref 0.1–1.3)
MONOCYTES NFR BLD AUTO: 5.3 %
MRSA PCR SCRN (OHS): NOT DETECTED
NEUTROPHILS # BLD AUTO: 15.09 X10(3)/MCL (ref 2.1–9.2)
NEUTROPHILS NFR BLD AUTO: 87.5 %
NITRITE UR QL STRIP: ABNORMAL
NRBC BLD AUTO-RTO: 0.2 %
OHS QRS DURATION: 84 MS
OHS QTC CALCULATION: 435 MS
PH UR STRIP: 5.5 [PH]
PLATELET # BLD AUTO: 506 X10(3)/MCL (ref 130–400)
PMV BLD AUTO: 8.9 FL (ref 7.4–10.4)
POTASSIUM SERPL-SCNC: 4 MMOL/L (ref 3.5–5.1)
PROT SERPL-MCNC: 7.2 GM/DL (ref 5.8–7.6)
PROT UR QL STRIP: ABNORMAL
RBC # BLD AUTO: 2.57 X10(6)/MCL (ref 4.7–6.1)
RBC #/AREA URNS AUTO: ABNORMAL /HPF
RSV A 5' UTR RNA NPH QL NAA+PROBE: NOT DETECTED
SARS-COV-2 RNA RESP QL NAA+PROBE: NOT DETECTED
SODIUM SERPL-SCNC: 135 MMOL/L (ref 136–145)
SP GR UR STRIP.AUTO: 1.02 (ref 1–1.03)
SPECIMEN OUTDATE: NORMAL
SQUAMOUS #/AREA URNS LPF: ABNORMAL /HPF
TROPONIN I SERPL-MCNC: 0.04 NG/ML (ref 0–0.04)
UNIT NUMBER: NORMAL
UNIT NUMBER: NORMAL
UROBILINOGEN UR STRIP-ACNC: NORMAL
WBC # BLD AUTO: 17.25 X10(3)/MCL (ref 4.5–11.5)
WBC #/AREA URNS AUTO: >100 /HPF
WBC CLUMPS UR QL AUTO: ABNORMAL
YEAST BUDDING URNS QL: ABNORMAL /HPF

## 2024-08-20 PROCEDURE — 86901 BLOOD TYPING SEROLOGIC RH(D): CPT | Performed by: EMERGENCY MEDICINE

## 2024-08-20 PROCEDURE — 63600175 PHARM REV CODE 636 W HCPCS: Performed by: PHYSICIAN ASSISTANT

## 2024-08-20 PROCEDURE — P9016 RBC LEUKOCYTES REDUCED: HCPCS | Performed by: EMERGENCY MEDICINE

## 2024-08-20 PROCEDURE — 63600175 PHARM REV CODE 636 W HCPCS: Performed by: EMERGENCY MEDICINE

## 2024-08-20 PROCEDURE — 85025 COMPLETE CBC W/AUTO DIFF WBC: CPT | Performed by: PHYSICIAN ASSISTANT

## 2024-08-20 PROCEDURE — 93005 ELECTROCARDIOGRAM TRACING: CPT

## 2024-08-20 PROCEDURE — 87154 CUL TYP ID BLD PTHGN 6+ TRGT: CPT | Performed by: EMERGENCY MEDICINE

## 2024-08-20 PROCEDURE — 93010 ELECTROCARDIOGRAM REPORT: CPT | Mod: ,,, | Performed by: INTERNAL MEDICINE

## 2024-08-20 PROCEDURE — 96361 HYDRATE IV INFUSION ADD-ON: CPT

## 2024-08-20 PROCEDURE — 25000003 PHARM REV CODE 250: Performed by: INTERNAL MEDICINE

## 2024-08-20 PROCEDURE — 83735 ASSAY OF MAGNESIUM: CPT | Performed by: PHYSICIAN ASSISTANT

## 2024-08-20 PROCEDURE — 87077 CULTURE AEROBIC IDENTIFY: CPT | Performed by: PHYSICIAN ASSISTANT

## 2024-08-20 PROCEDURE — 87641 MR-STAPH DNA AMP PROBE: CPT | Performed by: PHYSICIAN ASSISTANT

## 2024-08-20 PROCEDURE — 25000003 PHARM REV CODE 250: Performed by: EMERGENCY MEDICINE

## 2024-08-20 PROCEDURE — 25000003 PHARM REV CODE 250: Performed by: PHYSICIAN ASSISTANT

## 2024-08-20 PROCEDURE — 86900 BLOOD TYPING SEROLOGIC ABO: CPT | Performed by: EMERGENCY MEDICINE

## 2024-08-20 PROCEDURE — 81001 URINALYSIS AUTO W/SCOPE: CPT | Performed by: PHYSICIAN ASSISTANT

## 2024-08-20 PROCEDURE — 87077 CULTURE AEROBIC IDENTIFY: CPT | Performed by: EMERGENCY MEDICINE

## 2024-08-20 PROCEDURE — 30233N1 TRANSFUSION OF NONAUTOLOGOUS RED BLOOD CELLS INTO PERIPHERAL VEIN, PERCUTANEOUS APPROACH: ICD-10-PCS | Performed by: EMERGENCY MEDICINE

## 2024-08-20 PROCEDURE — 87086 URINE CULTURE/COLONY COUNT: CPT | Performed by: PHYSICIAN ASSISTANT

## 2024-08-20 PROCEDURE — 36430 TRANSFUSION BLD/BLD COMPNT: CPT

## 2024-08-20 PROCEDURE — 86923 COMPATIBILITY TEST ELECTRIC: CPT | Performed by: EMERGENCY MEDICINE

## 2024-08-20 PROCEDURE — 96365 THER/PROPH/DIAG IV INF INIT: CPT

## 2024-08-20 PROCEDURE — 99291 CRITICAL CARE FIRST HOUR: CPT

## 2024-08-20 PROCEDURE — 21400001 HC TELEMETRY ROOM

## 2024-08-20 PROCEDURE — 83605 ASSAY OF LACTIC ACID: CPT | Performed by: PHYSICIAN ASSISTANT

## 2024-08-20 PROCEDURE — 0241U COVID/RSV/FLU A&B PCR: CPT | Performed by: PHYSICIAN ASSISTANT

## 2024-08-20 PROCEDURE — 11000001 HC ACUTE MED/SURG PRIVATE ROOM

## 2024-08-20 PROCEDURE — 87040 BLOOD CULTURE FOR BACTERIA: CPT | Performed by: PHYSICIAN ASSISTANT

## 2024-08-20 PROCEDURE — 84484 ASSAY OF TROPONIN QUANT: CPT | Performed by: PHYSICIAN ASSISTANT

## 2024-08-20 PROCEDURE — 80053 COMPREHEN METABOLIC PANEL: CPT | Performed by: PHYSICIAN ASSISTANT

## 2024-08-20 RX ORDER — ACETAMINOPHEN 325 MG/1
650 TABLET ORAL EVERY 8 HOURS PRN
Status: DISCONTINUED | OUTPATIENT
Start: 2024-08-20 | End: 2024-08-30 | Stop reason: HOSPADM

## 2024-08-20 RX ORDER — ACETAMINOPHEN 325 MG/1
650 TABLET ORAL EVERY 4 HOURS PRN
Status: DISCONTINUED | OUTPATIENT
Start: 2024-08-20 | End: 2024-08-30 | Stop reason: HOSPADM

## 2024-08-20 RX ORDER — SODIUM CHLORIDE 9 MG/ML
INJECTION, SOLUTION INTRAVENOUS CONTINUOUS
Status: DISCONTINUED | OUTPATIENT
Start: 2024-08-20 | End: 2024-08-23

## 2024-08-20 RX ORDER — MORPHINE SULFATE 4 MG/ML
4 INJECTION, SOLUTION INTRAMUSCULAR; INTRAVENOUS
Status: COMPLETED | OUTPATIENT
Start: 2024-08-20 | End: 2024-08-20

## 2024-08-20 RX ORDER — GLUCAGON 1 MG
1 KIT INJECTION
Status: DISCONTINUED | OUTPATIENT
Start: 2024-08-20 | End: 2024-08-30 | Stop reason: HOSPADM

## 2024-08-20 RX ORDER — HYDROCODONE BITARTRATE AND ACETAMINOPHEN 500; 5 MG/1; MG/1
TABLET ORAL
Status: DISCONTINUED | OUTPATIENT
Start: 2024-08-20 | End: 2024-08-26

## 2024-08-20 RX ORDER — ONDANSETRON HYDROCHLORIDE 2 MG/ML
4 INJECTION, SOLUTION INTRAVENOUS
Status: COMPLETED | OUTPATIENT
Start: 2024-08-20 | End: 2024-08-20

## 2024-08-20 RX ORDER — SODIUM CHLORIDE 9 MG/ML
1000 INJECTION, SOLUTION INTRAVENOUS
Status: COMPLETED | OUTPATIENT
Start: 2024-08-20 | End: 2024-08-20

## 2024-08-20 RX ORDER — IBUPROFEN 200 MG
16 TABLET ORAL
Status: DISCONTINUED | OUTPATIENT
Start: 2024-08-20 | End: 2024-08-30 | Stop reason: HOSPADM

## 2024-08-20 RX ORDER — OXYCODONE AND ACETAMINOPHEN 10; 325 MG/1; MG/1
1 TABLET ORAL EVERY 4 HOURS PRN
Status: DISCONTINUED | OUTPATIENT
Start: 2024-08-20 | End: 2024-08-30 | Stop reason: HOSPADM

## 2024-08-20 RX ORDER — TRAZODONE HYDROCHLORIDE 100 MG/1
200 TABLET ORAL NIGHTLY PRN
Status: DISCONTINUED | OUTPATIENT
Start: 2024-08-21 | End: 2024-08-30 | Stop reason: HOSPADM

## 2024-08-20 RX ORDER — ONDANSETRON HYDROCHLORIDE 2 MG/ML
4 INJECTION, SOLUTION INTRAVENOUS EVERY 8 HOURS PRN
Status: DISCONTINUED | OUTPATIENT
Start: 2024-08-20 | End: 2024-08-30 | Stop reason: HOSPADM

## 2024-08-20 RX ORDER — HYDROCODONE BITARTRATE AND ACETAMINOPHEN 10; 325 MG/1; MG/1
1 TABLET ORAL EVERY 6 HOURS PRN
Status: DISCONTINUED | OUTPATIENT
Start: 2024-08-20 | End: 2024-08-20

## 2024-08-20 RX ORDER — ACETAMINOPHEN 10 MG/ML
1000 INJECTION, SOLUTION INTRAVENOUS ONCE
Status: COMPLETED | OUTPATIENT
Start: 2024-08-20 | End: 2024-08-20

## 2024-08-20 RX ORDER — IBUPROFEN 200 MG
24 TABLET ORAL
Status: DISCONTINUED | OUTPATIENT
Start: 2024-08-20 | End: 2024-08-30 | Stop reason: HOSPADM

## 2024-08-20 RX ADMIN — VANCOMYCIN HYDROCHLORIDE 1250 MG: 1.25 INJECTION, POWDER, LYOPHILIZED, FOR SOLUTION INTRAVENOUS at 09:08

## 2024-08-20 RX ADMIN — SODIUM CHLORIDE 1000 ML: 9 INJECTION, SOLUTION INTRAVENOUS at 12:08

## 2024-08-20 RX ADMIN — SODIUM CHLORIDE: 9 INJECTION, SOLUTION INTRAVENOUS at 07:08

## 2024-08-20 RX ADMIN — OXYCODONE HYDROCHLORIDE AND ACETAMINOPHEN 1 TABLET: 10; 325 TABLET ORAL at 07:08

## 2024-08-20 RX ADMIN — PIPERACILLIN AND TAZOBACTAM 4.5 G: 4; .5 INJECTION, POWDER, LYOPHILIZED, FOR SOLUTION INTRAVENOUS; PARENTERAL at 02:08

## 2024-08-20 RX ADMIN — ACETAMINOPHEN 1000 MG: 10 INJECTION, SOLUTION INTRAVENOUS at 11:08

## 2024-08-20 RX ADMIN — ONDANSETRON 4 MG: 2 INJECTION INTRAMUSCULAR; INTRAVENOUS at 02:08

## 2024-08-20 RX ADMIN — PIPERACILLIN SODIUM AND TAZOBACTAM SODIUM 4.5 G: 4; .5 INJECTION, POWDER, LYOPHILIZED, FOR SOLUTION INTRAVENOUS at 11:08

## 2024-08-20 RX ADMIN — SODIUM CHLORIDE 1000 ML: 9 INJECTION, SOLUTION INTRAVENOUS at 11:08

## 2024-08-20 RX ADMIN — MORPHINE SULFATE 4 MG: 4 INJECTION, SOLUTION INTRAMUSCULAR; INTRAVENOUS at 02:08
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
## 2024-08-20 NOTE — PROGRESS NOTES
"Pharmacokinetic Initial Assessment: IV Vancomycin    Assessment/Plan:    Initiate intravenous vancomycin with loading dose of 1250 mg once with subsequent doses when random concentrations are less than 20 mcg/mL  Desired empiric serum trough concentration is 15 to 20 mcg/mL  Draw vancomycin random level on 08/21 at 0430.  Pharmacy will continue to follow and monitor vancomycin.      Please contact pharmacy at extension  with any questions regarding this assessment.     Thank you for the consult,   Renetta Knight       Patient brief summary:  Bianca Khan is a 59 y.o. male initiated on antimicrobial therapy with IV Vancomycin for treatment of suspected sepsis    Drug Allergies:   Review of patient's allergies indicates:   Allergen Reactions    Baclofen Itching and Anxiety       Actual Body Weight:   63.5 kg    Renal Function:   Estimated Creatinine Clearance: 48.3 mL/min (A) (based on SCr of 1.46 mg/dL (H)).,     Dialysis Method (if applicable):  [facility]ON    CBC (last 72 hours):  Recent Labs   Lab Result Units 08/20/24  1132   WBC x10(3)/mcL 17.25*   Hgb g/dL 6.2*   Hct % 20.3*   Platelet x10(3)/mcL 506*   Mono % % 5.3   Eos % % 0.0   Basophil % % 0.1       Metabolic Panel (last 72 hours):  Recent Labs   Lab Result Units 08/20/24  1132 08/20/24  1338   Sodium mmol/L 135*  --    Potassium mmol/L 4.0  --    Chloride mmol/L 103  --    CO2 mmol/L 24  --    Glucose mg/dL 105  --    Glucose, UA   --  Normal   Blood Urea Nitrogen mg/dL 17.3  --    Creatinine mg/dL 1.46*  --    Albumin g/dL 2.5*  --    Bilirubin Total mg/dL 0.4  --    ALP unit/L 71  --    AST unit/L 23  --    ALT unit/L 8  --    Magnesium Level mg/dL 2.00  --        Drug levels (last 3 results):  No results for input(s): "VANCOMYCINRA", "VANCORANDOM", "VANCOMYCINPE", "VANCOPEAK", "VANCOMYCINTR", "VANCOTROUGH" in the last 72 hours.    Microbiologic Results:  Microbiology Results (last 7 days)       Procedure Component Value Units Date/Time    Blood Culture #2 " **CANNOT BE ORDERED STAT** [[id number]] Collected: 08/20/24 1540    Order Status: Resulted Specimen: Blood Updated: 08/20/24 1540    Wound Culture [[id number]] Collected: 08/20/24 1527    Order Status: Sent Specimen: Abscess from Sacral Updated: 08/20/24 1528    Urine culture [[id number]] Collected: 08/20/24 1338    Order Status: Sent Specimen: Urine Updated: 08/20/24 1357    Blood Culture #1 **CANNOT BE ORDERED STAT** [[id number]] Collected: 08/20/24 1232    Order Status: Resulted Specimen: Blood Updated: 08/20/24 1234

## 2024-08-20 NOTE — FIRST PROVIDER EVALUATION
Medical screening examination initiated.  I have conducted a focused provider triage encounter, findings are as follows:    Brief history of present illness:  59yoAAM history of paraplegia with fever and cough x 3days. Wife gave tylenol p-ta but patient vomited. Arrived EMS no meds given EMS. Tachy 90-100s. No history of afib.sacral wounds present with wound vac. Follows with Dr. YOUNG for ID/wounds    Vitals:    08/20/24 1052   BP: (!) 142/74   Pulse: (!) 116   Resp: (!) 22   Temp: (!) 102.6 °F (39.2 °C)   TempSrc: Oral   SpO2: 97%   Weight: 63.5 kg (140 lb)       Pertinent physical exam:  bedbound    Brief workup plan:  labs and imaging.    Preliminary workup initiated; this workup will be continued and followed by the physician or advanced practice provider that is assigned to the patient when roomed.

## 2024-08-20 NOTE — ED NOTES
Patient c/o pain to right bicep where blood is transfusing. Swelling, infiltration noted. Stopped blood, assessed for any reactions, none noted. Switched infusion to iv on left forearm. Notified md and charge. D/c iv, applied compress and ice. Assessed for any further reactions, none other than pain.
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
## 2024-08-21 LAB
ACINETOBACTER CALCOACETICUS-BAUMANNII COMPLEX (OHS): NOT DETECTED
ALBUMIN SERPL-MCNC: 2.2 G/DL (ref 3.4–4.8)
ALBUMIN/GLOB SERPL: 0.6 RATIO (ref 1.1–2)
ALP SERPL-CCNC: 59 UNIT/L (ref 40–150)
ALT SERPL-CCNC: 9 UNIT/L (ref 0–55)
ANION GAP SERPL CALC-SCNC: 7 MEQ/L
AST SERPL-CCNC: 17 UNIT/L (ref 5–34)
BACTEROIDES FRAGILIS (OHS): NOT DETECTED
BASOPHILS # BLD AUTO: 0.02 X10(3)/MCL
BASOPHILS NFR BLD AUTO: 0.2 %
BILIRUB SERPL-MCNC: 0.5 MG/DL
BUN SERPL-MCNC: 18.3 MG/DL (ref 8.4–25.7)
C AURIS DNA BLD POS QL NAA+NON-PROBE: NOT DETECTED
C GATTII+NEOFOR DNA CSF QL NAA+NON-PROBE: NOT DETECTED
CALCIUM SERPL-MCNC: 7.9 MG/DL (ref 8.8–10)
CANDIDA ALBICANS (OHS): DETECTED
CANDIDA GLABRATA (OHS): NOT DETECTED
CANDIDA KRUSEI (OHS): NOT DETECTED
CANDIDA PARAPSILOSIS (OHS): NOT DETECTED
CANDIDA TROPICALIS (OHS): NOT DETECTED
CHLORIDE SERPL-SCNC: 107 MMOL/L (ref 98–107)
CO2 SERPL-SCNC: 23 MMOL/L (ref 23–31)
CREAT SERPL-MCNC: 1.25 MG/DL (ref 0.73–1.18)
CREAT/UREA NIT SERPL: 15
CRP SERPL-MCNC: 243.6 MG/L
CTX-M (OHS): NOT DETECTED
ENTEROBACTER CLOACAE COMPLEX (OHS): NOT DETECTED
ENTEROBACTERALES (OHS): DETECTED
ENTEROCOCCUS FAECALIS (OHS): NOT DETECTED
ENTEROCOCCUS FAECIUM (OHS): NOT DETECTED
EOSINOPHIL # BLD AUTO: 0.03 X10(3)/MCL (ref 0–0.9)
EOSINOPHIL NFR BLD AUTO: 0.2 %
ERYTHROCYTE [DISTWIDTH] IN BLOOD BY AUTOMATED COUNT: 17.4 % (ref 11.5–17)
ERYTHROCYTE [SEDIMENTATION RATE] IN BLOOD: 102 MM/HR (ref 0–15)
ESCHERICHIA COLI (OHS): DETECTED
GFR SERPLBLD CREATININE-BSD FMLA CKD-EPI: >60 ML/MIN/1.73/M2
GLOBULIN SER-MCNC: 3.6 GM/DL (ref 2.4–3.5)
GLUCOSE SERPL-MCNC: 88 MG/DL (ref 82–115)
GP B STREP DNA CSF QL NAA+NON-PROBE: NOT DETECTED
HAEM INFLU DNA CSF QL NAA+NON-PROBE: NOT DETECTED
HCT VFR BLD AUTO: 22.7 % (ref 42–52)
HGB BLD-MCNC: 7 G/DL (ref 14–18)
IMM GRANULOCYTES # BLD AUTO: 0.07 X10(3)/MCL (ref 0–0.04)
IMM GRANULOCYTES NFR BLD AUTO: 0.6 %
IMP (OHS): NOT DETECTED
KLEBSIELLA AEROGENES (OHS): NOT DETECTED
KLEBSIELLA OXYTOCA (OHS): NOT DETECTED
KLEBSIELLA PNEUMONIAE GROUP (OHS): NOT DETECTED
KPC (OHS): NOT DETECTED
L MONOCYTOG DNA CSF QL NAA+NON-PROBE: NOT DETECTED
LYMPHOCYTES # BLD AUTO: 1.37 X10(3)/MCL (ref 0.6–4.6)
LYMPHOCYTES NFR BLD AUTO: 11.3 %
MCH RBC QN AUTO: 25.4 PG (ref 27–31)
MCHC RBC AUTO-ENTMCNC: 30.8 G/DL (ref 33–36)
MCR-1 (OHS): NOT DETECTED
MCV RBC AUTO: 82.2 FL (ref 80–94)
MECA/C (OHS): ABNORMAL
MECA/C AND MREJ (MRSA)(OHS): ABNORMAL
MONOCYTES # BLD AUTO: 0.95 X10(3)/MCL (ref 0.1–1.3)
MONOCYTES NFR BLD AUTO: 7.8 %
N MEN DNA CSF QL NAA+NON-PROBE: NOT DETECTED
NDM (OHS): NOT DETECTED
NEUTROPHILS # BLD AUTO: 9.69 X10(3)/MCL (ref 2.1–9.2)
NEUTROPHILS NFR BLD AUTO: 79.9 %
NRBC BLD AUTO-RTO: 0.2 %
OXA-48-LIKE (OHS): NOT DETECTED
PLATELET # BLD AUTO: 451 X10(3)/MCL (ref 130–400)
PMV BLD AUTO: 9.8 FL (ref 7.4–10.4)
POTASSIUM SERPL-SCNC: 4.4 MMOL/L (ref 3.5–5.1)
PROT SERPL-MCNC: 5.8 GM/DL (ref 5.8–7.6)
PROTEUS SPP. (OHS): NOT DETECTED
PSEUDOMONAS AERUGINOSA (OHS): NOT DETECTED
RBC # BLD AUTO: 2.76 X10(6)/MCL (ref 4.7–6.1)
S ENT+BONG DNA STL QL NAA+NON-PROBE: NOT DETECTED
S PNEUM DNA CSF QL NAA+NON-PROBE: NOT DETECTED
SERRATIA MARCESCENS (OHS): NOT DETECTED
SODIUM SERPL-SCNC: 137 MMOL/L (ref 136–145)
STAPHYLOCOCCUS AUREUS (OHS): NOT DETECTED
STAPHYLOCOCCUS EPIDERMIDIS (OHS): NOT DETECTED
STAPHYLOCOCCUS LUGDUNENSIS (OHS): NOT DETECTED
STAPHYLOCOCCUS SPP. (OHS): NOT DETECTED
STENOTROPHOMONAS MALTOPHILIA (OHS): NOT DETECTED
STREPTOCOCCUS PYOGENES (GROUP A)(OHS): NOT DETECTED
STREPTOCOCCUS SPP. (OHS): NOT DETECTED
VANA/B (OHS): ABNORMAL
VANCOMYCIN SERPL-MCNC: 15.4 UG/ML (ref 15–20)
VIM (OHS): NOT DETECTED
WBC # BLD AUTO: 12.13 X10(3)/MCL (ref 4.5–11.5)

## 2024-08-21 PROCEDURE — 63600175 PHARM REV CODE 636 W HCPCS: Performed by: PHYSICIAN ASSISTANT

## 2024-08-21 PROCEDURE — 63600175 PHARM REV CODE 636 W HCPCS: Performed by: INTERNAL MEDICINE

## 2024-08-21 PROCEDURE — 63600175 PHARM REV CODE 636 W HCPCS: Performed by: NURSE PRACTITIONER

## 2024-08-21 PROCEDURE — 36415 COLL VENOUS BLD VENIPUNCTURE: CPT | Performed by: PHYSICIAN ASSISTANT

## 2024-08-21 PROCEDURE — 25000003 PHARM REV CODE 250: Performed by: INTERNAL MEDICINE

## 2024-08-21 PROCEDURE — 25000003 PHARM REV CODE 250: Performed by: NURSE PRACTITIONER

## 2024-08-21 PROCEDURE — 36415 COLL VENOUS BLD VENIPUNCTURE: CPT | Performed by: INTERNAL MEDICINE

## 2024-08-21 PROCEDURE — 80202 ASSAY OF VANCOMYCIN: CPT | Performed by: INTERNAL MEDICINE

## 2024-08-21 PROCEDURE — 87449 NOS EACH ORGANISM AG IA: CPT | Performed by: INTERNAL MEDICINE

## 2024-08-21 PROCEDURE — 85025 COMPLETE CBC W/AUTO DIFF WBC: CPT | Performed by: PHYSICIAN ASSISTANT

## 2024-08-21 PROCEDURE — 87040 BLOOD CULTURE FOR BACTERIA: CPT | Performed by: INTERNAL MEDICINE

## 2024-08-21 PROCEDURE — 11000001 HC ACUTE MED/SURG PRIVATE ROOM

## 2024-08-21 PROCEDURE — 86140 C-REACTIVE PROTEIN: CPT | Performed by: INTERNAL MEDICINE

## 2024-08-21 PROCEDURE — 25000003 PHARM REV CODE 250: Performed by: PHYSICIAN ASSISTANT

## 2024-08-21 PROCEDURE — 21400001 HC TELEMETRY ROOM

## 2024-08-21 PROCEDURE — 85652 RBC SED RATE AUTOMATED: CPT | Performed by: INTERNAL MEDICINE

## 2024-08-21 PROCEDURE — 80053 COMPREHEN METABOLIC PANEL: CPT | Performed by: PHYSICIAN ASSISTANT

## 2024-08-21 RX ORDER — HYDROXYZINE PAMOATE 50 MG/1
50 CAPSULE ORAL NIGHTLY PRN
Status: DISCONTINUED | OUTPATIENT
Start: 2024-08-21 | End: 2024-08-30 | Stop reason: HOSPADM

## 2024-08-21 RX ORDER — METHOCARBAMOL 750 MG/1
750 TABLET, FILM COATED ORAL 2 TIMES DAILY
Status: DISCONTINUED | OUTPATIENT
Start: 2024-08-21 | End: 2024-08-30 | Stop reason: HOSPADM

## 2024-08-21 RX ORDER — GUAIFENESIN AND DEXTROMETHORPHAN HYDROBROMIDE 10; 100 MG/5ML; MG/5ML
5 SYRUP ORAL EVERY 4 HOURS PRN
Status: DISCONTINUED | OUTPATIENT
Start: 2024-08-21 | End: 2024-08-30 | Stop reason: HOSPADM

## 2024-08-21 RX ORDER — FLUCONAZOLE 2 MG/ML
400 INJECTION, SOLUTION INTRAVENOUS
Status: DISCONTINUED | OUTPATIENT
Start: 2024-08-21 | End: 2024-08-30 | Stop reason: HOSPADM

## 2024-08-21 RX ORDER — ATORVASTATIN CALCIUM 10 MG/1
20 TABLET, FILM COATED ORAL NIGHTLY
Status: DISCONTINUED | OUTPATIENT
Start: 2024-08-21 | End: 2024-08-30 | Stop reason: HOSPADM

## 2024-08-21 RX ORDER — FAMOTIDINE 20 MG/1
20 TABLET, FILM COATED ORAL DAILY
Status: DISCONTINUED | OUTPATIENT
Start: 2024-08-21 | End: 2024-08-30 | Stop reason: HOSPADM

## 2024-08-21 RX ORDER — GABAPENTIN 300 MG/1
300 CAPSULE ORAL 3 TIMES DAILY
Status: DISCONTINUED | OUTPATIENT
Start: 2024-08-21 | End: 2024-08-30 | Stop reason: HOSPADM

## 2024-08-21 RX ORDER — DOXYCYCLINE HYCLATE 100 MG
100 TABLET ORAL EVERY 12 HOURS
Status: DISCONTINUED | OUTPATIENT
Start: 2024-08-21 | End: 2024-08-30 | Stop reason: HOSPADM

## 2024-08-21 RX ORDER — OXYBUTYNIN CHLORIDE 5 MG/1
5 TABLET ORAL 2 TIMES DAILY
Status: DISCONTINUED | OUTPATIENT
Start: 2024-08-21 | End: 2024-08-23

## 2024-08-21 RX ORDER — FLUOXETINE HYDROCHLORIDE 20 MG/1
20 CAPSULE ORAL DAILY
Status: DISCONTINUED | OUTPATIENT
Start: 2024-08-21 | End: 2024-08-30 | Stop reason: HOSPADM

## 2024-08-21 RX ADMIN — GABAPENTIN 300 MG: 300 CAPSULE ORAL at 03:08

## 2024-08-21 RX ADMIN — OXYCODONE HYDROCHLORIDE AND ACETAMINOPHEN 1 TABLET: 10; 325 TABLET ORAL at 12:08

## 2024-08-21 RX ADMIN — OXYCODONE HYDROCHLORIDE AND ACETAMINOPHEN 1 TABLET: 10; 325 TABLET ORAL at 03:08

## 2024-08-21 RX ADMIN — OXYBUTYNIN CHLORIDE 5 MG: 5 TABLET ORAL at 10:08

## 2024-08-21 RX ADMIN — FLUOXETINE HYDROCHLORIDE 20 MG: 20 CAPSULE ORAL at 10:08

## 2024-08-21 RX ADMIN — FLUCONAZOLE 400 MG: 2 INJECTION, SOLUTION INTRAVENOUS at 08:08

## 2024-08-21 RX ADMIN — OXYBUTYNIN CHLORIDE 5 MG: 5 TABLET ORAL at 08:08

## 2024-08-21 RX ADMIN — DOXYCYCLINE HYCLATE 100 MG: 100 TABLET, COATED ORAL at 08:08

## 2024-08-21 RX ADMIN — METHOCARBAMOL 750 MG: 750 TABLET ORAL at 10:08

## 2024-08-21 RX ADMIN — ACETAMINOPHEN 325MG 650 MG: 325 TABLET ORAL at 01:08

## 2024-08-21 RX ADMIN — ATORVASTATIN CALCIUM 20 MG: 10 TABLET, FILM COATED ORAL at 08:08

## 2024-08-21 RX ADMIN — MICAFUNGIN SODIUM 100 MG: 100 INJECTION, POWDER, LYOPHILIZED, FOR SOLUTION INTRAVENOUS at 10:08

## 2024-08-21 RX ADMIN — METHOCARBAMOL 750 MG: 750 TABLET ORAL at 08:08

## 2024-08-21 RX ADMIN — PIPERACILLIN SODIUM AND TAZOBACTAM SODIUM 4.5 G: 4; .5 INJECTION, POWDER, LYOPHILIZED, FOR SOLUTION INTRAVENOUS at 06:08

## 2024-08-21 RX ADMIN — FAMOTIDINE 20 MG: 20 TABLET, FILM COATED ORAL at 10:08

## 2024-08-21 RX ADMIN — TRAZODONE HYDROCHLORIDE 200 MG: 100 TABLET ORAL at 12:08

## 2024-08-21 RX ADMIN — OXYCODONE HYDROCHLORIDE AND ACETAMINOPHEN 1 TABLET: 10; 325 TABLET ORAL at 10:08

## 2024-08-21 RX ADMIN — GABAPENTIN 300 MG: 300 CAPSULE ORAL at 08:08

## 2024-08-21 RX ADMIN — GUAIFENESIN AND DEXTROMETHORPHAN 5 ML: 100; 10 SYRUP ORAL at 08:08

## 2024-08-21 NOTE — PLAN OF CARE
Problem: Adult Inpatient Plan of Care  Goal: Plan of Care Review  Outcome: Progressing  Goal: Patient-Specific Goal (Individualized)  Outcome: Progressing  Goal: Absence of Hospital-Acquired Illness or Injury  Outcome: Progressing  Goal: Optimal Comfort and Wellbeing  Outcome: Progressing  Goal: Readiness for Transition of Care  Outcome: Progressing     Problem: Wound  Goal: Optimal Coping  Outcome: Progressing  Goal: Optimal Functional Ability  Outcome: Progressing  Goal: Absence of Infection Signs and Symptoms  Outcome: Progressing  Goal: Improved Oral Intake  Outcome: Progressing  Goal: Optimal Pain Control and Function  Outcome: Progressing  Goal: Skin Health and Integrity  Outcome: Progressing  Goal: Optimal Wound Healing  Outcome: Progressing     Problem: Sepsis/Septic Shock  Goal: Optimal Coping  Outcome: Progressing  Goal: Absence of Bleeding  Outcome: Progressing  Goal: Blood Glucose Level Within Targeted Range  Outcome: Progressing  Goal: Absence of Infection Signs and Symptoms  Outcome: Progressing  Goal: Optimal Nutrition Intake  Outcome: Progressing     Problem: Diabetes Comorbidity  Goal: Blood Glucose Level Within Targeted Range  Outcome: Progressing     Problem: Skin Injury Risk Increased  Goal: Skin Health and Integrity  Outcome: Progressing

## 2024-08-21 NOTE — PLAN OF CARE
08/21/24 1502   Discharge Assessment   Assessment Type Discharge Planning Assessment   Confirmed/corrected address, phone number and insurance Yes   Confirmed Demographics Correct on Facesheet   Source of Information patient;family   Communicated MINISTERIO with patient/caregiver Date not available/Unable to determine   Reason For Admission sepsis, UTI, anemia   People in Home spouse   Do you expect to return to your current living situation? Yes   Do you have help at home or someone to help you manage your care at home? Yes   Who are your caregiver(s) and their phone number(s)? spouse Katrina Erin   Prior to hospitilization cognitive status: Alert/Oriented   Current cognitive status: Alert/Oriented   Walking or Climbing Stairs Difficulty yes   Walking or Climbing Stairs ambulation difficulty, requires equipment   Mobility Management motorized chair   Dressing/Bathing Difficulty no   Home Accessibility wheelchair accessible   Home Layout Able to live on 1st floor   Equipment Currently Used at Home wound care supplies;power chair   Readmission within 30 days? No   Patient currently being followed by outpatient case management? No   Do you currently have service(s) that help you manage your care at home? Yes   Name and Contact number of agency [facility]   Is the pt/caregiver preference to resume services with current agency Yes   Do you take prescription medications? Yes   Do you have prescription coverage? Yes   Coverage medicare   Do you have any problems affording any of your prescribed medications? No   Is the patient taking medications as prescribed? yes   How do you get to doctors appointments? family or friend will provide   Are you on dialysis? No   Do you take coumadin? No   Discharge Plan A Home Health   Discharge Plan B Home Health   DME Needed Upon Discharge  none   Discharge Plan discussed with: Patient;Spouse/sig other   Transition of Care Barriers None   OTHER   Name(s) of People in Home Katrina Khan  [phone]

## 2024-08-21 NOTE — NURSING
Nurses Note -- 4 Eyes      8/21/2024   12:12 AM      Skin assessed during: Admit      [] No Altered Skin Integrity Present    []Prevention Measures Documented      [x] Yes- Altered Skin Integrity Present or Discovered   [x] LDA Added if Not in Epic (Describe Wound)   [x] New Altered Skin Integrity was Present on Admit and Documented in LDA   [x] Wound Image Taken    Wound Care Consulted? Yes    Attending Nurse:  Emelia Bernabe RN/Staff Member:   Carlita Tyson

## 2024-08-21 NOTE — PROGRESS NOTES
Pharmacist Renal Dose Adjustment Note    Bianca hKan is a 59 y.o. male being treated with the medication famotidine    Patient Data:    Vital Signs (Most Recent):  Temp: 98.5 °F (36.9 °C) (08/21/24 0729)  Pulse: 99 (08/21/24 0729)  Resp: 19 (08/21/24 0334)  BP: 129/70 (08/21/24 0729)  SpO2: (!) 83 % (08/21/24 0729) Vital Signs (72h Range):  Temp:  [97.9 °F (36.6 °C)-102.6 °F (39.2 °C)]   Pulse:  []   Resp:  [13-22]   BP: ()/(43-74)   SpO2:  [83 %-98 %]      Recent Labs   Lab 08/14/24  1500 08/20/24  1132   CREATININE 0.99 1.46*     Serum creatinine: 1.46 mg/dL (H) 08/20/24 1132  Estimated creatinine clearance: 48.3 mL/min (A)    Medication:famotidine dose: 20mg frequency BID will be changed to medication:famotidine dose:20mg frequency:daily for CrCl < 50    Pharmacist's Name: Ravindra Townsend  Pharmacist's Extension: [phone]

## 2024-08-21 NOTE — PROGRESS NOTES
[facility] - 5th Floor Med Surg  Wound Care    Patient Name:  Bianca Khan   MRN:  [id number]  Date: 2024  Diagnosis: <principal problem not specified>    History:     Past Medical History:   Diagnosis Date    Arthritis     Chronic ulcer of ankle 2022    Frequent UTI 2019    Generalized anxiety disorder 2022    Neurogenic bladder 2022    Osteomyelitis 2022    Presence of suprapubic catheter 2022    Pure hypercholesterolemia 2022    Retention of urine, unspecified 2019    Spinal cord injury at T1-T6 level 2018       Social History     Socioeconomic History    Marital status:    Tobacco Use    Smoking status: Some Days     Current packs/day: 0.00     Average packs/day: 0.2 packs/day for 41.5 years (10.4 ttl pk-yrs)     Types: Cigars, Cigarettes     Start date: 1982     Last attempt to quit: 2023     Years since quittin.0    Smokeless tobacco: Never   Substance and Sexual Activity    Alcohol use: Not Currently     Alcohol/week: 2.0 standard drinks of alcohol     Types: 2 Cans of beer per week    Drug use: Not Currently     Types: Oxycodone    Sexual activity: Not Currently     Partners: Female     Birth control/protection: None     Social Determinants of Health     Financial Resource Strain: Low Risk  (2023)    Overall Financial Resource Strain (CARDIA)     Difficulty of Paying Living Expenses: Not very hard   Food Insecurity: No Food Insecurity (2023)    Hunger Vital Sign     Worried About Running Out of Food in the Last Year: Never true     Ran Out of Food in the Last Year: Never true   Transportation Needs: No Transportation Needs (2023)    PRAPARE - Transportation     Lack of Transportation (Medical): No     Lack of Transportation (Non-Medical): No   Physical Activity: Inactive (2023)    Exercise Vital Sign     Days of Exercise per Week: 0 days     Minutes of Exercise per Session: 0 min   Stress:  No Stress Concern Present (12/11/2023)    [employer] - Occupational Stress Questionnaire     Feeling of Stress : Only a little   Housing Stability: Low Risk  (12/11/2023)    Housing Stability Vital Sign     Unable to Pay for Housing in the Last Year: No     Number of Places Lived in the Last Year: 1     Unstable Housing in the Last Year: No       Precautions:     Allergies as of 08/20/2024 - Reviewed 08/20/2024   Allergen Reaction Noted    Baclofen Itching and Anxiety 06/17/2019       WO Assessment Details/Treatment        08/21/24 1531   WOCN Assessment   Visit Date 08/21/24   Visit Time 1531   Consult Type New   [facility] Speciality Wound   Wound pressure   Intervention assessed;chart review;orders   Teaching on-going   Skin Interventions   Device Skin Pressure Protection absorbent pad utilized/changed        Wound 05/30/24 0000 Pressure Injury Right Buttocks   Date First Assessed/Time First Assessed: 05/30/24 0000   Primary Wound Type: Pressure Injury  Side: Right  Location: Buttocks  Is this injury device related?: No   Wound Image    Pressure Injury Stage 4   Dressing Appearance Dry;Intact;Clean   Drainage Amount Moderate   Drainage Characteristics/Odor Serosanguineous   Appearance Pink;Yellow;Slough   Tissue loss description Full thickness   Red (%), Wound Tissue Color 50 %   Yellow (%), Wound Tissue Color 50 %   Periwound Area Intact   Wound Edges Defined   Care Cleansed with:;Sterile normal saline   Dressing Applied;Gauze, wet to moist     Wound care consulted for sacrum and left hip. Sacrum has a wound vac on and intact that was changed by home health. Will change on tomorrow. Right buttocks: Cleanse with NS. Apply saline moistened 4 x 4, abd pad. Secure with medipore tape. Change BID and PRN. Nursing to continue with preventative measures. Will discuss with wound care NP. MARY perez ordered.     08/21/2024

## 2024-08-21 NOTE — PROGRESS NOTES
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 See orders

## 2024-08-21 NOTE — NURSING
Nurses Note -- 4 Eyes      8/21/2024   0700      Skin assessed during: Q Shift Change      [] No Altered Skin Integrity Present    []Prevention Measures Documented      [x] Yes- Altered Skin Integrity Present or Discovered   [] LDA Added if Not in Epic (Describe Wound)   [] New Altered Skin Integrity was Present on Admit and Documented in LDA   [] Wound Image Taken    Wound Care Consulted? Yes    Attending Nurse:  Briseida Bernabe RN/Staff Member:   carlota
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
## 2024-08-22 LAB
ABO + RH BLD: NORMAL
ALBUMIN SERPL-MCNC: 2 G/DL (ref 3.4–4.8)
ALBUMIN/GLOB SERPL: 0.5 RATIO (ref 1.1–2)
ALP SERPL-CCNC: 65 UNIT/L (ref 40–150)
ALT SERPL-CCNC: 9 UNIT/L (ref 0–55)
ANION GAP SERPL CALC-SCNC: 9 MEQ/L
AST SERPL-CCNC: 14 UNIT/L (ref 5–34)
BASOPHILS # BLD AUTO: 0.04 X10(3)/MCL
BASOPHILS NFR BLD AUTO: 0.5 %
BILIRUB SERPL-MCNC: 0.4 MG/DL
BLD PROD TYP BPU: NORMAL
BLOOD UNIT EXPIRATION DATE: NORMAL
BLOOD UNIT TYPE CODE: 5100
BUN SERPL-MCNC: 12.4 MG/DL (ref 8.4–25.7)
CALCIUM SERPL-MCNC: 7.9 MG/DL (ref 8.8–10)
CHLORIDE SERPL-SCNC: 107 MMOL/L (ref 98–107)
CO2 SERPL-SCNC: 21 MMOL/L (ref 23–31)
CREAT SERPL-MCNC: 1.04 MG/DL (ref 0.73–1.18)
CREAT/UREA NIT SERPL: 12
CROSSMATCH INTERPRETATION: NORMAL
DISPENSE STATUS: NORMAL
EOSINOPHIL # BLD AUTO: 0.06 X10(3)/MCL (ref 0–0.9)
EOSINOPHIL NFR BLD AUTO: 0.7 %
ERYTHROCYTE [DISTWIDTH] IN BLOOD BY AUTOMATED COUNT: 18 % (ref 11.5–17)
FOLATE SERPL-MCNC: 15 NG/ML (ref 7–31.4)
GFR SERPLBLD CREATININE-BSD FMLA CKD-EPI: >60 ML/MIN/1.73/M2
GLOBULIN SER-MCNC: 3.7 GM/DL (ref 2.4–3.5)
GLUCOSE SERPL-MCNC: 81 MG/DL (ref 82–115)
HAPTOGLOB SERPL-MCNC: 345 MG/DL (ref 40–368)
HAV IGM SERPL QL IA: NONREACTIVE
HBV CORE IGM SERPL QL IA: NONREACTIVE
HBV SURFACE AG SERPL QL IA: NONREACTIVE
HCT VFR BLD AUTO: 20.9 % (ref 42–52)
HCV AB SERPL QL IA: REACTIVE
HEMATOLOGIST REVIEW: NORMAL
HGB BLD-MCNC: 6.4 G/DL (ref 14–18)
IMM GRANULOCYTES # BLD AUTO: 0.06 X10(3)/MCL (ref 0–0.04)
IMM GRANULOCYTES NFR BLD AUTO: 0.7 %
IRON SATN MFR SERPL: 7 % (ref 20–50)
IRON SERPL-MCNC: 13 UG/DL (ref 65–175)
LDH SERPL-CCNC: 252 U/L (ref 125–220)
LYMPHOCYTES # BLD AUTO: 1.62 X10(3)/MCL (ref 0.6–4.6)
LYMPHOCYTES NFR BLD AUTO: 18.3 %
MCH RBC QN AUTO: 25.3 PG (ref 27–31)
MCHC RBC AUTO-ENTMCNC: 30.6 G/DL (ref 33–36)
MCV RBC AUTO: 82.6 FL (ref 80–94)
MONOCYTES # BLD AUTO: 1.07 X10(3)/MCL (ref 0.1–1.3)
MONOCYTES NFR BLD AUTO: 12.1 %
NEUTROPHILS # BLD AUTO: 5.98 X10(3)/MCL (ref 2.1–9.2)
NEUTROPHILS NFR BLD AUTO: 67.7 %
NRBC BLD AUTO-RTO: 0.2 %
PLATELET # BLD AUTO: 461 X10(3)/MCL (ref 130–400)
PMV BLD AUTO: 9.5 FL (ref 7.4–10.4)
POTASSIUM SERPL-SCNC: 4.2 MMOL/L (ref 3.5–5.1)
PROT SERPL-MCNC: 5.7 GM/DL (ref 5.8–7.6)
RBC # BLD AUTO: 2.53 X10(6)/MCL (ref 4.7–6.1)
RET# (OHS): 0.05 X10E6/UL (ref 0.03–0.1)
RETICULOCYTE COUNT AUTOMATED (OLG): 1.93 % (ref 1.1–2.1)
SODIUM SERPL-SCNC: 137 MMOL/L (ref 136–145)
TIBC SERPL-MCNC: 166 UG/DL (ref 69–240)
TIBC SERPL-MCNC: 179 UG/DL (ref 250–450)
TRANSFERRIN SERPL-MCNC: 170 MG/DL (ref 174–364)
UNIT NUMBER: NORMAL
VIT B12 SERPL-MCNC: >2000 PG/ML (ref 213–816)
WBC # BLD AUTO: 8.83 X10(3)/MCL (ref 4.5–11.5)

## 2024-08-22 PROCEDURE — 02HV33Z INSERTION OF INFUSION DEVICE INTO SUPERIOR VENA CAVA, PERCUTANEOUS APPROACH: ICD-10-PCS | Performed by: INTERNAL MEDICINE

## 2024-08-22 PROCEDURE — 80074 ACUTE HEPATITIS PANEL: CPT | Performed by: INTERNAL MEDICINE

## 2024-08-22 PROCEDURE — 0T2BX0Z CHANGE DRAINAGE DEVICE IN BLADDER, EXTERNAL APPROACH: ICD-10-PCS | Performed by: INTERNAL MEDICINE

## 2024-08-22 PROCEDURE — 25000003 PHARM REV CODE 250: Performed by: NURSE PRACTITIONER

## 2024-08-22 PROCEDURE — 36415 COLL VENOUS BLD VENIPUNCTURE: CPT | Performed by: INTERNAL MEDICINE

## 2024-08-22 PROCEDURE — 82746 ASSAY OF FOLIC ACID SERUM: CPT | Performed by: INTERNAL MEDICINE

## 2024-08-22 PROCEDURE — 25000003 PHARM REV CODE 250: Performed by: PHYSICIAN ASSISTANT

## 2024-08-22 PROCEDURE — P9016 RBC LEUKOCYTES REDUCED: HCPCS | Performed by: INTERNAL MEDICINE

## 2024-08-22 PROCEDURE — 86923 COMPATIBILITY TEST ELECTRIC: CPT | Mod: 91 | Performed by: INTERNAL MEDICINE

## 2024-08-22 PROCEDURE — 83010 ASSAY OF HAPTOGLOBIN QUANT: CPT | Performed by: INTERNAL MEDICINE

## 2024-08-22 PROCEDURE — P9016 RBC LEUKOCYTES REDUCED: HCPCS | Performed by: NURSE PRACTITIONER

## 2024-08-22 PROCEDURE — 85025 COMPLETE CBC W/AUTO DIFF WBC: CPT | Performed by: INTERNAL MEDICINE

## 2024-08-22 PROCEDURE — 82607 VITAMIN B-12: CPT | Performed by: INTERNAL MEDICINE

## 2024-08-22 PROCEDURE — 11000001 HC ACUTE MED/SURG PRIVATE ROOM

## 2024-08-22 PROCEDURE — 83540 ASSAY OF IRON: CPT | Performed by: INTERNAL MEDICINE

## 2024-08-22 PROCEDURE — 94760 N-INVAS EAR/PLS OXIMETRY 1: CPT

## 2024-08-22 PROCEDURE — 86923 COMPATIBILITY TEST ELECTRIC: CPT | Performed by: NURSE PRACTITIONER

## 2024-08-22 PROCEDURE — 85045 AUTOMATED RETICULOCYTE COUNT: CPT | Performed by: INTERNAL MEDICINE

## 2024-08-22 PROCEDURE — 25000003 PHARM REV CODE 250: Performed by: INTERNAL MEDICINE

## 2024-08-22 PROCEDURE — 63600175 PHARM REV CODE 636 W HCPCS: Performed by: PHYSICIAN ASSISTANT

## 2024-08-22 PROCEDURE — 80053 COMPREHEN METABOLIC PANEL: CPT | Performed by: INTERNAL MEDICINE

## 2024-08-22 PROCEDURE — 83615 LACTATE (LD) (LDH) ENZYME: CPT | Performed by: INTERNAL MEDICINE

## 2024-08-22 PROCEDURE — 21400001 HC TELEMETRY ROOM

## 2024-08-22 PROCEDURE — 97606 NEG PRS WND THER DME>50 SQCM: CPT

## 2024-08-22 RX ORDER — MICONAZOLE NITRATE 2 G/100G
POWDER TOPICAL 2 TIMES DAILY
Status: DISCONTINUED | OUTPATIENT
Start: 2024-08-22 | End: 2024-08-30 | Stop reason: HOSPADM

## 2024-08-22 RX ORDER — HYDROCODONE BITARTRATE AND ACETAMINOPHEN 500; 5 MG/1; MG/1
TABLET ORAL
Status: DISCONTINUED | OUTPATIENT
Start: 2024-08-22 | End: 2024-08-26

## 2024-08-22 RX ORDER — HYDROCODONE BITARTRATE AND ACETAMINOPHEN 500; 5 MG/1; MG/1
TABLET ORAL
Status: DISCONTINUED | OUTPATIENT
Start: 2024-08-22 | End: 2024-08-30 | Stop reason: HOSPADM

## 2024-08-22 RX ADMIN — OXYBUTYNIN CHLORIDE 5 MG: 5 TABLET ORAL at 09:08

## 2024-08-22 RX ADMIN — SODIUM CHLORIDE: 9 INJECTION, SOLUTION INTRAVENOUS at 11:08

## 2024-08-22 RX ADMIN — OXYCODONE HYDROCHLORIDE AND ACETAMINOPHEN 1 TABLET: 10; 325 TABLET ORAL at 09:08

## 2024-08-22 RX ADMIN — TRAZODONE HYDROCHLORIDE 200 MG: 100 TABLET ORAL at 01:08

## 2024-08-22 RX ADMIN — OXYCODONE HYDROCHLORIDE AND ACETAMINOPHEN 1 TABLET: 10; 325 TABLET ORAL at 10:08

## 2024-08-22 RX ADMIN — GABAPENTIN 300 MG: 300 CAPSULE ORAL at 01:08

## 2024-08-22 RX ADMIN — SODIUM CHLORIDE: 9 INJECTION, SOLUTION INTRAVENOUS at 06:08

## 2024-08-22 RX ADMIN — OXYCODONE HYDROCHLORIDE AND ACETAMINOPHEN 1 TABLET: 10; 325 TABLET ORAL at 01:08

## 2024-08-22 RX ADMIN — GABAPENTIN 300 MG: 300 CAPSULE ORAL at 09:08

## 2024-08-22 RX ADMIN — METHOCARBAMOL 750 MG: 750 TABLET ORAL at 09:08

## 2024-08-22 RX ADMIN — MICONAZOLE NITRATE 2 % TOPICAL POWDER: at 09:08

## 2024-08-22 RX ADMIN — FAMOTIDINE 20 MG: 20 TABLET, FILM COATED ORAL at 09:08

## 2024-08-22 RX ADMIN — PIPERACILLIN SODIUM AND TAZOBACTAM SODIUM 4.5 G: 4; .5 INJECTION, POWDER, LYOPHILIZED, FOR SOLUTION INTRAVENOUS at 06:08

## 2024-08-22 RX ADMIN — ATORVASTATIN CALCIUM 20 MG: 10 TABLET, FILM COATED ORAL at 09:08

## 2024-08-22 RX ADMIN — OXYCODONE HYDROCHLORIDE AND ACETAMINOPHEN 1 TABLET: 10; 325 TABLET ORAL at 06:08

## 2024-08-22 RX ADMIN — FLUOXETINE HYDROCHLORIDE 20 MG: 20 CAPSULE ORAL at 09:08

## 2024-08-22 RX ADMIN — HYDROXYZINE PAMOATE 50 MG: 50 CAPSULE ORAL at 10:08

## 2024-08-22 RX ADMIN — PIPERACILLIN SODIUM AND TAZOBACTAM SODIUM 4.5 G: 4; .5 INJECTION, POWDER, LYOPHILIZED, FOR SOLUTION INTRAVENOUS at 01:08

## 2024-08-22 RX ADMIN — DOXYCYCLINE HYCLATE 100 MG: 100 TABLET, COATED ORAL at 09:08
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
## 2024-08-22 NOTE — NURSING
Nurses Note -- 4 Eyes      8/21/2024   7:44 PM      Skin assessed during: Q Shift Change      [] No Altered Skin Integrity Present    []Prevention Measures Documented      [x] Yes- Altered Skin Integrity Present or Discovered   [] LDA Added if Not in Epic (Describe Wound)   [] New Altered Skin Integrity was Present on Admit and Documented in LDA   [] Wound Image Taken    Wound Care Consulted? Yes    Attending Nurse:  Emelia Bernabe RN/Staff Member:   Briseida

## 2024-08-22 NOTE — PROCEDURES
Bianca Khan is a 59 y.o. male patient.    Temp: 98.1 °F (36.7 °C) (08/22/24 1304)  Pulse: 90 (08/22/24 1304)  Resp: 18 (08/22/24 1356)  BP: 138/81 (08/22/24 1304)  SpO2: (!) 94 % (08/22/24 1304)  Weight: 63.5 kg (140 lb) (08/20/24 1052)       Bladder Cath    Date/Time: 8/22/2024 2:00 PM  Location procedure was performed: [facility] UROLOGY    Performed by: Kristin Quinn AGACNP-BC  Authorized by: Kristin Quinn AGACNP-BC  Pre-operative diagnosis: neurogenic bladder, urosepsis  Post-operative diagnosis: neurogenic bladder, urosepsis  Indications: catheter change and neurogenic bladder  Local anesthesia used: no    Anesthesia:  Local anesthesia used: no    Patient sedated: no  Preparation: Patient was prepped and draped in the usual sterile fashion.  Catheter insertion: indwelling  Catheter type: Stover (suprapubic)  Catheter size: 20 Fr (30cc)  Complicated insertion: no  Altered anatomy: no  Bladder irrigation: no  Number of attempts: 1  Urine characteristics: clear and yellow  Complications: No  Patient tolerance: Patient tolerated the procedure well with no immediate complications          8/22/2024

## 2024-08-22 NOTE — PROGRESS NOTES
[facility] - 5th Floor Med Surg  Wound Care    Patient Name:  Bianca Khan   MRN:  [id number]  Date: 2024  Diagnosis: <principal problem not specified>    History:     Past Medical History:   Diagnosis Date    Arthritis     Chronic ulcer of ankle 2022    Frequent UTI 2019    Generalized anxiety disorder 2022    Neurogenic bladder 2022    Osteomyelitis 2022    Presence of suprapubic catheter 2022    Pure hypercholesterolemia 2022    Retention of urine, unspecified 2019    Spinal cord injury at T1-T6 level 2018       Social History     Socioeconomic History    Marital status:    Tobacco Use    Smoking status: Some Days     Current packs/day: 0.00     Average packs/day: 0.2 packs/day for 41.5 years (10.4 ttl pk-yrs)     Types: Cigars, Cigarettes     Start date: 1982     Last attempt to quit: 2023     Years since quittin.0    Smokeless tobacco: Never   Substance and Sexual Activity    Alcohol use: Not Currently     Alcohol/week: 2.0 standard drinks of alcohol     Types: 2 Cans of beer per week    Drug use: Not Currently     Types: Oxycodone    Sexual activity: Not Currently     Partners: Female     Birth control/protection: None     Social Determinants of Health     Financial Resource Strain: Low Risk  (2023)    Overall Financial Resource Strain (CARDIA)     Difficulty of Paying Living Expenses: Not very hard   Food Insecurity: No Food Insecurity (2023)    Hunger Vital Sign     Worried About Running Out of Food in the Last Year: Never true     Ran Out of Food in the Last Year: Never true   Transportation Needs: No Transportation Needs (2023)    PRAPARE - Transportation     Lack of Transportation (Medical): No     Lack of Transportation (Non-Medical): No   Physical Activity: Inactive (2023)    Exercise Vital Sign     Days of Exercise per Week: 0 days     Minutes of Exercise per Session: 0 min   Stress:  No Stress Concern Present (12/11/2023)    [employer] - Occupational Stress Questionnaire     Feeling of Stress : Only a little   Housing Stability: Low Risk  (12/11/2023)    Housing Stability Vital Sign     Unable to Pay for Housing in the Last Year: No     Number of Places Lived in the Last Year: 1     Unstable Housing in the Last Year: No       Precautions:     Allergies as of 08/20/2024 - Reviewed 08/20/2024   Allergen Reaction Noted    Baclofen Itching and Anxiety 06/17/2019       WOC Assessment Details/Treatment        08/22/24 0958   WOCN Assessment   Visit Date 08/22/24   Visit Time 0958   Consult Type New   WOCN Speciality Wound   WOCN List wound vac   Procedure wound vac   Intervention chart review;assessed;applied;orders   Teaching on-going        Altered Skin Integrity 01/09/24 0848 upper Sacral spine   Date First Assessed/Time First Assessed: 01/09/24 0848   Altered Skin Integrity Present on Admission - Did Patient arrive to the hospital with altered skin?: suspected hospital acquired  Orientation: upper  Location: Sacral spine   Wound Image    Description of Altered Skin Integrity Full thickness tissue loss. Base is covered by slough and/or eschar in the wound bed   Dressing Appearance Intact   Drainage Amount None   Drainage Characteristics/Odor Malodorous   Appearance Pink;Red;Yellow   Tissue loss description Full thickness   Black (%), Wound Tissue Color 0 %   Red (%), Wound Tissue Color 90 %   Yellow (%), Wound Tissue Color 10 %   Periwound Area Intact;Dry   Wound Edges Defined   Wound Length (cm) 5.6 cm   Wound Width (cm) 4.4 cm   Wound Depth (cm) 1 cm   Wound Volume (cm^3) 24.64 cm^3   Wound Surface Area (cm^2) 24.64 cm^2   Care Cleansed with:;Sterile normal saline   Dressing Applied;Other (comment)  (NPWT w/ versatel and black sponge)        Wound 05/30/24 0000 Pressure Injury Right Buttocks   Date First Assessed/Time First Assessed: 05/30/24 0000   Primary Wound  Type: Pressure Injury  Side: Right  Location: Buttocks  Is this injury device related?: No   Wound Image    Pressure Injury Stage 4   Dressing Appearance Intact;Dried drainage   Drainage Amount Moderate   Drainage Characteristics/Odor Serosanguineous   Appearance Pink;Red;Yellow;Slough   Tissue loss description Full thickness   Black (%), Wound Tissue Color 0 %   Red (%), Wound Tissue Color 50 %   Yellow (%), Wound Tissue Color 50 %   Periwound Area Intact;Dry   Wound Edges Defined   Wound Length (cm) 4.5 cm   Wound Width (cm) 4.8 cm   Wound Depth (cm) 1.2 cm   Wound Volume (cm^3) 25.92 cm^3   Wound Surface Area (cm^2) 21.6 cm^2   Care Cleansed with:;Sterile normal saline   Dressing Applied;Other (comment)  (bridged w/ sacral wound w/ NPWT w/ versatel and black sponge)        Wound 08/22/24 0800 Other (comment) Left Heel   Date First Assessed/Time First Assessed: 08/22/24 0800   Primary Wound Type: Other (comment)  Side: Left  Location: Heel   Wound Image    Dressing Appearance Intact;Dried drainage   Drainage Amount Small   Drainage Characteristics/Odor Serosanguineous   Appearance Moist;Black;Red;Yellow;Slough   Tissue loss description Full thickness   Black (%), Wound Tissue Color 10 %   Red (%), Wound Tissue Color 80 %   Yellow (%), Wound Tissue Color 10 %   Periwound Area Intact;Dry   Wound Edges Defined   Wound Length (cm) 2 cm   Wound Width (cm) 2.7 cm   Wound Depth (cm) 0.2 cm   Wound Volume (cm^3) 1.08 cm^3   Wound Surface Area (cm^2) 5.4 cm^2   Care Cleansed with:;Sterile normal saline   Dressing Applied;Calcium alginate;Silver;Gauze;Rolled gauze;Other (comment)  (medipore tape)        Wound 08/22/24 0800 Other (comment) Scrotum   Date First Assessed/Time First Assessed: 08/22/24 0800   Primary Wound Type: Other (comment)  Location: Scrotum   Wound Image    Dressing Appearance Open to air   Drainage Amount None   Appearance Pink;Red;Moist   Tissue loss description Partial thickness   Black (%), Wound  Tissue Color 0 %   Red (%), Wound Tissue Color 100 %   Yellow (%), Wound Tissue Color 0 %   Periwound Area Intact;Dry   Wound Edges Irregular   Wound Length (cm) 3.2 cm   Wound Width (cm) 0.2 cm   Wound Depth (cm) 0.1 cm   Wound Volume (cm^3) 0.064 cm^3   Wound Surface Area (cm^2) 0.64 cm^2   Care Cleansed with:;Sterile normal saline   Dressing Other (comment)  (left open to air)     WOCN consulted for Sacrum, left buttocks, L heel, and scrotum along with wound vac placement. Wound care NP at bedside. Discussed POC and pain management w/ nurse Virginia. Family at bedside. Explained reason for visit. Removed pt.'s home vac. Applied KCI wound vac w/ versatel and black sponge to sacrum wound and bridged w/ black foam to left buttock wound. Wound vac seal without any leaks and set at 125mmHg.Treatment recommendations: Sacrum and left buttock: KCI wound vac changes to be done on Mondays and thursdays. Scrotum: clean w/ remedy cleanser, dry well, apply miconazole powder to area and groins. BID/PRN if soilage. L heel: clean w/ normal saline, dry well, apply silver alginate cloth to wound bed, gauze, kerlix, medipore tape. Daily/prn if soilage. Educated pt. On keeping areas clean and dry and the importance of offloading q2hrs w/ purple wedge that is in room. Will order pt. An MARY green mattress per wound care NP recommendation. Nursing to cont. Tx. Recs and preventative measures. Will follow up.     08/22/2024

## 2024-08-22 NOTE — CONSULTS
Bianca Khan 1965  [id number]  8/22/2024    CONSULTING PHYSICIAN: Dr. Morocho    REASON FOR CONSULTATION: SP tube exchange    HPI:  The patient is a 59-year-old male with a past medical history of spinal cord injury with paraplegia and neurogenic bladder with SP tube, sacral decubitus, diabetes, hypertension, atrial fibrillation on Xarelto, DVT status post IVC, PPM.  He was known to Dr. Simental.  His SP tube is exchanged every 2 weeks with home health.  He presented to the emergency room on 08/20/2024 with fever, headache, weakness and chills that began on Sunday.  Temp on arrival 102.6.  Urine cultures with Gram-negative rods, blood culture with Gram-negative rods and Candida albicans.  He is on micafungin Zosyn. Urology has been consulted for suprapubic tube exchange. He is now afebrile, 400ml UOP overnight; lab work this morning reveals WBC 8.83, H&H 6.4/20.9, BUN and creatinine 12.4/1.04.    Patient continues with catheter exchanges every 2 weeks.  His last exchange was about a week ago.  He denies any issues with his Stover.  Family at bedside    Past Medical History:   Diagnosis Date    Arthritis     Chronic ulcer of ankle 05/26/2022    Frequent UTI 07/02/2019    Generalized anxiety disorder 05/26/2022    Neurogenic bladder 05/26/2022    Osteomyelitis 05/26/2022    Presence of suprapubic catheter 05/26/2022    Pure hypercholesterolemia 05/26/2022    Retention of urine, unspecified 08/09/2019    Spinal cord injury at T1-T6 level 04/20/2018     Past Surgical History:   Procedure Laterality Date    FRACTURE SURGERY  2021    INCISION AND DRAINAGE, LOWER EXTREMITY Right 06/29/2023    Procedure: INCISION AND DRAINAGE, LOWER EXTREMITY;  Surgeon: Prabhu Shen DO;  Location: [facility];  Service: Orthopedics;  Laterality: Right;  supine bone foam wash stuff cultures    INCISION AND DRAINAGE, LOWER EXTREMITY Right 11/30/2023    Procedure: INCISION AND DRAINAGE, LOWER EXTREMITY;  Surgeon: Prabhu Shen DO;  Location:  [facility] OR;  Service: Orthopedics;  Laterality: Right;  supine any table bone foam wash stuff possible wound vac    INCISION AND DRAINAGE, LOWER EXTREMITY Right 2023    Procedure: INCISION AND DRAINAGE, LOWER EXTREMITY;  Surgeon: Prabhu Shen DO;  Location: [facility] OR;  Service: Orthopedics;  Laterality: Right;  supine bone foam vascular bed removal of distal femoral plate, wash stuff, possible AKA    INSERTION OF INTRAMEDULLARY MARISA Right 08/10/2022    Procedure: INSERTION, INTRAMEDULLARY MARISA RIGHT TIBIA;  Surgeon: Jorge Orellana MD;  Location: [facility] OR;  Service: Orthopedics;  Laterality: Right;    JOINT REPLACEMENT      Ankel    REMOVAL OF HARDWARE FROM LOWER EXTREMITY Right 2023    Procedure: REMOVAL, HARDWARE, LOWER EXTREMITY;  Surgeon: Prabhu Shen DO;  Location: [facility] OR;  Service: Orthopedics;  Laterality: Right;    SPINE SURGERY  2018     Family History   Problem Relation Name Age of Onset    No Known Problems Mother      No Known Problems Father       Social History     Tobacco Use    Smoking status: Some Days     Current packs/day: 0.00     Average packs/day: 0.2 packs/day for 41.5 years (10.4 ttl pk-yrs)     Types: Cigars, Cigarettes     Start date: 1982     Last attempt to quit: 2023     Years since quittin.0    Smokeless tobacco: Never   Substance Use Topics    Alcohol use: Not Currently     Alcohol/week: 2.0 standard drinks of alcohol     Types: 2 Cans of beer per week    Drug use: Not Currently     Types: Oxycodone     Current Facility-Administered Medications   Medication Dose Route Frequency Provider Last Rate Last Admin    0.9%  NaCl infusion (for blood administration)   Intravenous Q24H PRN Natalya Bernard MD        0.9%  NaCl infusion (for blood administration)   Intravenous Q24H PRN Hermila Cohen FNP        0.9%  NaCl infusion (for blood administration)   Intravenous Q24H PRN Roro Hunter MD        0.9%  NaCl infusion   Intravenous Continuous Javy  Arturo MORALES PA-C 100 mL/hr at 08/22/24 0633 New Bag at 08/22/24 0633    acetaminophen tablet 650 mg  650 mg Oral Q8H PRN Arturo Palafox PA-C   650 mg at 08/21/24 1328    acetaminophen tablet 650 mg  650 mg Oral Q4H PRN Arturo Palafox PA-C   650 mg at 08/21/24 1331    atorvastatin tablet 20 mg  20 mg Oral Nightly Fareed Morocho MD   20 mg at 08/21/24 2027    dextromethorphan-guaiFENesin  mg/5 ml liquid 5 mL  5 mL Oral Q4H PRN Fareed Morocho MD   5 mL at 08/21/24 2027    dextrose 10% bolus 125 mL 125 mL  12.5 g Intravenous PRN Arturo Palafox PA-C        dextrose 10% bolus 250 mL 250 mL  25 g Intravenous PRN Arturo Palafox PA-C        doxycycline tablet 100 mg  100 mg Oral Q12H Magdi Long, FNP   100 mg at 08/21/24 2027    famotidine tablet 20 mg  20 mg Oral Daily Fareed Morocho MD   20 mg at 08/21/24 1029    fluconazole (DIFLUCAN) IVPB 400 mg  400 mg Intravenous Q24H Magdi Long FNP   Stopped at 08/21/24 2129    FLUoxetine capsule 20 mg  20 mg Oral Daily Fareed Morocho MD   20 mg at 08/21/24 1029    gabapentin capsule 300 mg  300 mg Oral TID Fareed Morocho MD   300 mg at 08/21/24 2026    glucagon (human recombinant) injection 1 mg  1 mg Intramuscular PRN Arturo Palafox PA-C        glucose chewable tablet 16 g  16 g Oral PRN Arturo Palafox PA-C        glucose chewable tablet 24 g  24 g Oral PRN Arturo Palafox PA-C        hydrOXYzine pamoate capsule 50 mg  50 mg Oral Nightly PRN Fareed Morocho MD        methocarbamoL tablet 750 mg  750 mg Oral BID Fareed Morocho MD   750 mg at 08/21/24 2027    ondansetron injection 4 mg  4 mg Intravenous Q8H PRN Arturo Palafox PA-C        oxybutynin tablet 5 mg  5 mg Oral BID Fareed Morocho MD   5 mg at 08/21/24 2027    oxyCODONE-acetaminophen  mg per tablet 1 tablet  1 tablet Oral Q4H PRN Fareed Morocho MD   1 tablet at 08/22/24 0641    piperacillin-tazobactam (ZOSYN) 4.5 g in D5W 100 mL IVPB (MB+)  4.5 g Intravenous Q8H Arturo Palafox  REGINALDO MORALES   Stopped at 08/22/24 0528    traZODone tablet 200 mg  200 mg Oral Nightly PRN Hermila Cohen, FNP   200 mg at 08/22/24 0113     Review of patient's allergies indicates:   Allergen Reactions    Baclofen Itching and Anxiety       ROS: 12 point review of systems negative other than the HPI    PHYSICAL EXAM:  Vitals:    08/22/24 0613 08/22/24 0641 08/22/24 0815 08/22/24 0816   BP: (!) 152/79  (!) 151/81    Pulse: 81  82    Resp:  18     Temp: 98.3 °F (36.8 °C)  98.5 °F (36.9 °C)    TempSrc: Oral      SpO2: (!) 93%  (!) 94% (!) 94%   Weight:           Intake/Output Summary (Last 24 hours) at 8/22/2024 0941  Last data filed at 8/21/2024 2101  Gross per 24 hour   Intake 600 ml   Output 850 ml   Net -250 ml       GEN: WN/WD NAD  HEENT: normocephalic, atraumatic, PERRL  CV: RRR  RESP: Even and unlabored  ABD:  Soft, NT ND  :  Yellow urine with small amount of sediment draining to  bag, SP tube site looks good without any erythema or drainage.  Catheter exchanged without difficulty.  NEURO:  Awake alert and oriented x4, paraplegia    LABS:  Recent Results (from the past 24 hour(s))   Comprehensive Metabolic Panel    Collection Time: 08/22/24  3:52 AM   Result Value Ref Range    Sodium 137 136 - 145 mmol/L    Potassium 4.2 3.5 - 5.1 mmol/L    Chloride 107 98 - 107 mmol/L    CO2 21 (L) 23 - 31 mmol/L    Glucose 81 (L) 82 - 115 mg/dL    Blood Urea Nitrogen 12.4 8.4 - 25.7 mg/dL    Creatinine 1.04 0.73 - 1.18 mg/dL    Calcium 7.9 (L) 8.8 - 10.0 mg/dL    Protein Total 5.7 (L) 5.8 - 7.6 gm/dL    Albumin 2.0 (L) 3.4 - 4.8 g/dL    Globulin 3.7 (H) 2.4 - 3.5 gm/dL    Albumin/Globulin Ratio 0.5 (L) 1.1 - 2.0 ratio    Bilirubin Total 0.4 <=1.5 mg/dL    ALP 65 40 - 150 unit/L    ALT 9 0 - 55 unit/L    AST 14 5 - 34 unit/L    eGFR >60 mL/min/1.73/m2    Anion Gap 9.0 mEq/L    BUN/Creatinine Ratio 12    CBC with Differential    Collection Time: 08/22/24  3:52 AM   Result Value Ref Range    WBC 8.83 4.50 - 11.50 x10(3)/mcL     RBC 2.53 (L) 4.70 - 6.10 x10(6)/mcL    Hgb 6.4 (L) 14.0 - 18.0 g/dL    Hct 20.9 (L) 42.0 - 52.0 %    MCV 82.6 80.0 - 94.0 fL    MCH 25.3 (L) 27.0 - 31.0 pg    MCHC 30.6 (L) 33.0 - 36.0 g/dL    RDW 18.0 (H) 11.5 - 17.0 %    Platelet 461 (H) 130 - 400 x10(3)/mcL    MPV 9.5 7.4 - 10.4 fL    Neut % 67.7 %    Lymph % 18.3 %    Mono % 12.1 %    Eos % 0.7 %    Basophil % 0.5 %    Lymph # 1.62 0.6 - 4.6 x10(3)/mcL    Neut # 5.98 2.1 - 9.2 x10(3)/mcL    Mono # 1.07 0.1 - 1.3 x10(3)/mcL    Eos # 0.06 0 - 0.9 x10(3)/mcL    Baso # 0.04 <=0.2 x10(3)/mcL    IG# 0.06 (H) 0 - 0.04 x10(3)/mcL    IG% 0.7 %    NRBC% 0.2 %       IMAGING:  Imaging Results              X-Ray Chest AP Portable (Final result)  Result time 08/20/24 12:29:49      Final result by Rob Arauz MD (08/20/24 12:29:49)                   Impression:      No acute chest disease is identified.      Electronically signed by: Rob Arauz  Date:    08/20/2024  Time:    12:29               Narrative:    EXAMINATION:  XR CHEST AP PORTABLE    CLINICAL HISTORY:  , Cough, unspecified.    COMPARISON:  May 30, 2024    FINDINGS:  No alveolar consolidation, effusion, or pneumothorax is seen.   The thoracic aorta is normal  cardiac silhouette, central pulmonary vessels and mediastinum are normal in size and are grossly unremarkable.  There has been surgical manipulation of the thoracic spine                                      ASSESSMENT:  -paraplegia with neurogenic bladder and suprapubic tube admitted for urosepsis  -ANTON - resolved    PLAN:  -suprapubic catheter exchanged without difficulty.  -continue with exchanges Q 2 weeks after discharge.    -please call as needed with any issues      Kristin Quinn NP

## 2024-08-23 LAB
1,3 BETA GLUCAN SER QL: NEGATIVE
1,3 BETA GLUCAN SER-MCNC: <31 PG/ML
ANION GAP SERPL CALC-SCNC: 7 MEQ/L
BACTERIA BLD CULT: ABNORMAL
BACTERIA UR CULT: ABNORMAL
BASOPHILS # BLD AUTO: 0.04 X10(3)/MCL
BASOPHILS NFR BLD AUTO: 0.6 %
BUN SERPL-MCNC: 7.7 MG/DL (ref 8.4–25.7)
CALCIUM SERPL-MCNC: 8.1 MG/DL (ref 8.8–10)
CHLORIDE SERPL-SCNC: 108 MMOL/L (ref 98–107)
CO2 SERPL-SCNC: 21 MMOL/L (ref 23–31)
CREAT SERPL-MCNC: 0.85 MG/DL (ref 0.73–1.18)
CREAT/UREA NIT SERPL: 9
D DIMER PPP IA.FEU-MCNC: 3.29 UG/ML FEU (ref 0–0.5)
EOSINOPHIL # BLD AUTO: 0.1 X10(3)/MCL (ref 0–0.9)
EOSINOPHIL NFR BLD AUTO: 1.4 %
ERYTHROCYTE [DISTWIDTH] IN BLOOD BY AUTOMATED COUNT: 17.1 % (ref 11.5–17)
GFR SERPLBLD CREATININE-BSD FMLA CKD-EPI: >60 ML/MIN/1.73/M2
GLUCOSE SERPL-MCNC: 87 MG/DL (ref 82–115)
GRAM STN SPEC: ABNORMAL
HCT VFR BLD AUTO: 29.5 % (ref 42–52)
HGB BLD-MCNC: 9.3 G/DL (ref 14–18)
IMM GRANULOCYTES # BLD AUTO: 0.03 X10(3)/MCL (ref 0–0.04)
IMM GRANULOCYTES NFR BLD AUTO: 0.4 %
LYMPHOCYTES # BLD AUTO: 1.65 X10(3)/MCL (ref 0.6–4.6)
LYMPHOCYTES NFR BLD AUTO: 23.4 %
MCH RBC QN AUTO: 25.7 PG (ref 27–31)
MCHC RBC AUTO-ENTMCNC: 31.5 G/DL (ref 33–36)
MCV RBC AUTO: 81.5 FL (ref 80–94)
MONOCYTES # BLD AUTO: 1.14 X10(3)/MCL (ref 0.1–1.3)
MONOCYTES NFR BLD AUTO: 16.2 %
NEUTROPHILS # BLD AUTO: 4.08 X10(3)/MCL (ref 2.1–9.2)
NEUTROPHILS NFR BLD AUTO: 58 %
NRBC BLD AUTO-RTO: 0.3 %
PLATELET # BLD AUTO: 474 X10(3)/MCL (ref 130–400)
PMV BLD AUTO: 9.5 FL (ref 7.4–10.4)
POTASSIUM SERPL-SCNC: 4.1 MMOL/L (ref 3.5–5.1)
RBC # BLD AUTO: 3.62 X10(6)/MCL (ref 4.7–6.1)
SODIUM SERPL-SCNC: 136 MMOL/L (ref 136–145)
WBC # BLD AUTO: 7.04 X10(3)/MCL (ref 4.5–11.5)

## 2024-08-23 PROCEDURE — 25000003 PHARM REV CODE 250: Performed by: INTERNAL MEDICINE

## 2024-08-23 PROCEDURE — 80048 BASIC METABOLIC PNL TOTAL CA: CPT | Performed by: INTERNAL MEDICINE

## 2024-08-23 PROCEDURE — 21400001 HC TELEMETRY ROOM

## 2024-08-23 PROCEDURE — 63600175 PHARM REV CODE 636 W HCPCS: Performed by: NURSE PRACTITIONER

## 2024-08-23 PROCEDURE — 25000003 PHARM REV CODE 250: Performed by: NURSE PRACTITIONER

## 2024-08-23 PROCEDURE — 85025 COMPLETE CBC W/AUTO DIFF WBC: CPT | Performed by: INTERNAL MEDICINE

## 2024-08-23 PROCEDURE — 25000003 PHARM REV CODE 250: Performed by: PHYSICIAN ASSISTANT

## 2024-08-23 PROCEDURE — 63600175 PHARM REV CODE 636 W HCPCS: Performed by: PHYSICIAN ASSISTANT

## 2024-08-23 PROCEDURE — 85379 FIBRIN DEGRADATION QUANT: CPT | Performed by: INTERNAL MEDICINE

## 2024-08-23 PROCEDURE — 11000001 HC ACUTE MED/SURG PRIVATE ROOM

## 2024-08-23 PROCEDURE — 25500020 PHARM REV CODE 255: Performed by: INTERNAL MEDICINE

## 2024-08-23 PROCEDURE — 63600175 PHARM REV CODE 636 W HCPCS: Performed by: INTERNAL MEDICINE

## 2024-08-23 PROCEDURE — 36415 COLL VENOUS BLD VENIPUNCTURE: CPT | Performed by: INTERNAL MEDICINE

## 2024-08-23 RX ORDER — FUROSEMIDE 10 MG/ML
20 INJECTION INTRAMUSCULAR; INTRAVENOUS EVERY 12 HOURS
Status: DISCONTINUED | OUTPATIENT
Start: 2024-08-23 | End: 2024-08-30 | Stop reason: HOSPADM

## 2024-08-23 RX ORDER — HYDRALAZINE HYDROCHLORIDE 20 MG/ML
10 INJECTION INTRAMUSCULAR; INTRAVENOUS EVERY 4 HOURS PRN
Status: DISCONTINUED | OUTPATIENT
Start: 2024-08-23 | End: 2024-08-30 | Stop reason: HOSPADM

## 2024-08-23 RX ORDER — OXYBUTYNIN CHLORIDE 5 MG/1
5 TABLET ORAL 3 TIMES DAILY
Status: DISCONTINUED | OUTPATIENT
Start: 2024-08-23 | End: 2024-08-30 | Stop reason: HOSPADM

## 2024-08-23 RX ADMIN — MEROPENEM 1 G: 1 INJECTION, POWDER, FOR SOLUTION INTRAVENOUS at 09:08

## 2024-08-23 RX ADMIN — OXYCODONE HYDROCHLORIDE AND ACETAMINOPHEN 1 TABLET: 10; 325 TABLET ORAL at 12:08

## 2024-08-23 RX ADMIN — OXYCODONE HYDROCHLORIDE AND ACETAMINOPHEN 1 TABLET: 10; 325 TABLET ORAL at 08:08

## 2024-08-23 RX ADMIN — MICONAZOLE NITRATE 2 % TOPICAL POWDER: at 08:08

## 2024-08-23 RX ADMIN — GABAPENTIN 300 MG: 300 CAPSULE ORAL at 02:08

## 2024-08-23 RX ADMIN — DOXYCYCLINE HYCLATE 100 MG: 100 TABLET, COATED ORAL at 08:08

## 2024-08-23 RX ADMIN — GABAPENTIN 300 MG: 300 CAPSULE ORAL at 08:08

## 2024-08-23 RX ADMIN — FAMOTIDINE 20 MG: 20 TABLET, FILM COATED ORAL at 08:08

## 2024-08-23 RX ADMIN — OXYBUTYNIN CHLORIDE 5 MG: 5 TABLET ORAL at 08:08

## 2024-08-23 RX ADMIN — FLUCONAZOLE 400 MG: 2 INJECTION, SOLUTION INTRAVENOUS at 06:08

## 2024-08-23 RX ADMIN — OXYCODONE HYDROCHLORIDE AND ACETAMINOPHEN 1 TABLET: 10; 325 TABLET ORAL at 04:08

## 2024-08-23 RX ADMIN — TRAZODONE HYDROCHLORIDE 200 MG: 100 TABLET ORAL at 09:08

## 2024-08-23 RX ADMIN — FLUOXETINE HYDROCHLORIDE 20 MG: 20 CAPSULE ORAL at 08:08

## 2024-08-23 RX ADMIN — PIPERACILLIN SODIUM AND TAZOBACTAM SODIUM 4.5 G: 4; .5 INJECTION, POWDER, LYOPHILIZED, FOR SOLUTION INTRAVENOUS at 10:08

## 2024-08-23 RX ADMIN — ATORVASTATIN CALCIUM 20 MG: 10 TABLET, FILM COATED ORAL at 08:08

## 2024-08-23 RX ADMIN — MICONAZOLE NITRATE 2 % TOPICAL POWDER: at 09:08

## 2024-08-23 RX ADMIN — FUROSEMIDE 20 MG: 10 INJECTION, SOLUTION INTRAMUSCULAR; INTRAVENOUS at 08:08

## 2024-08-23 RX ADMIN — METHOCARBAMOL 750 MG: 750 TABLET ORAL at 08:08

## 2024-08-23 RX ADMIN — HYDROXYZINE PAMOATE 50 MG: 50 CAPSULE ORAL at 09:08

## 2024-08-23 RX ADMIN — OXYBUTYNIN CHLORIDE 5 MG: 5 TABLET ORAL at 02:08

## 2024-08-23 RX ADMIN — MEROPENEM 1 G: 1 INJECTION, POWDER, FOR SOLUTION INTRAVENOUS at 01:08

## 2024-08-23 RX ADMIN — HYDRALAZINE HYDROCHLORIDE 10 MG: 20 INJECTION INTRAMUSCULAR; INTRAVENOUS at 02:08

## 2024-08-23 RX ADMIN — IOHEXOL 57 ML: 350 INJECTION, SOLUTION INTRAVENOUS at 05:08

## 2024-08-23 RX ADMIN — PIPERACILLIN SODIUM AND TAZOBACTAM SODIUM 4.5 G: 4; .5 INJECTION, POWDER, LYOPHILIZED, FOR SOLUTION INTRAVENOUS at 02:08

## 2024-08-23 NOTE — NURSING
Nurses Note -- 4 Eyes      8/22/2024   2100 pm      Skin assessed during: Q Shift Change      [] No Altered Skin Integrity Present    []Prevention Measures Documented      [x] Yes- Altered Skin Integrity Present or Discovered   [] LDA Added if Not in Epic (Describe Wound)   [] New Altered Skin Integrity was Present on Admit and Documented in LDA   [] Wound Image Taken    Wound Care Consulted? Yes    Attending Nurse:  Virginia Bernabe RN/Staff Member:  [place]

## 2024-08-23 NOTE — CONSULTS
Infectious Diseases Consultation    Inpatient consult to Infectious Diseases  Consult performed by: Magdi Long FNP  Consult ordered by: Fareed Morocho MD      HPI:   58 y/o male with past medical history of T-spine cord injury with paraplegia, diabetes, HTN, hepatitis-C, chronic MRSA L ankle wound/osteomyelitis, was on suppressive doxycycline and R knee infection/septic arthritis and osteomyelitis with GBS/Enterococcus and Ecoli isolates who presented to ER on 8/20 with fevers, cough and body aches. On admit he was noted to be febrile with leukocytosis, WBC 17.25. Blood cultures revealed GNR with Ecoli and Candida Albicans detected on BCID. U/A with 1+ nitrites, 500 LE, 50-99 RBC, >100 WBC, occasional bacteria, budding yeast. Urine culture >100k GNR. Wound culture negative thus far.  and .6. MRSA/MSSA PCR (-). CXR unremarkable.   He is currently on Zosyn and Micafungin    Past Medical and Surgical History  Allergies :   Baclofen    Medical :   He has a past medical history of Arthritis, Chronic ulcer of ankle (05/26/2022), Frequent UTI (07/02/2019), Generalized anxiety disorder (05/26/2022), Neurogenic bladder (05/26/2022), Osteomyelitis (05/26/2022), Presence of suprapubic catheter (05/26/2022), Pure hypercholesterolemia (05/26/2022), Retention of urine, unspecified (08/09/2019), and Spinal cord injury at T1-T6 level (04/20/2018).    Surgical :   He has a past surgical history that includes Insertion of intramedullary raven (Right, 08/10/2022); incision and drainage, lower extremity (Right, 06/29/2023); Fracture surgery (2021); Spine surgery (March 1st 2018); Joint replacement (2021); incision and drainage, lower extremity (Right, 11/30/2023); incision and drainage, lower extremity (Right, 12/1/2023); and Removal of hardware from lower extremity (Right, 12/1/2023).     Family History  His family history includes No Known Problems in his father and mother.    Social History  He reports that he  has been smoking cigars and cigarettes. He started smoking about 42 years ago. He has a 10.4 pack-year smoking history. He has never used smokeless tobacco. He reports that he does not currently use alcohol after a past usage of about 2.0 standard drinks of alcohol per week. He reports that he does not currently use drugs after having used the following drugs: Oxycodone.     Review of Systems   Constitutional:  Positive for fever and malaise/fatigue.   HENT: Negative.     Eyes: Negative.    Respiratory: Negative.     Cardiovascular: Negative.    Gastrointestinal: Negative.    Musculoskeletal: Negative.    Skin:         Multiple wounds   Neurological:  Positive for focal weakness and weakness.   All other systems reviewed and are negative.    Objective   Physical Exam  Vitals reviewed.   Constitutional:       Appearance: He is ill-appearing.   HENT:      Head: Normocephalic.   Cardiovascular:      Rate and Rhythm: Normal rate and regular rhythm.   Pulmonary:      Effort: Pulmonary effort is normal. No respiratory distress.      Breath sounds: Normal breath sounds. No wheezing.   Abdominal:      General: Bowel sounds are normal. There is no distension.      Palpations: Abdomen is soft.      Tenderness: There is no abdominal tenderness.   Musculoskeletal:      Cervical back: Normal range of motion.      Comments: Paraplegia   Skin:     Findings: No rash.      Comments: Wounds dressed, wound vac in place   Neurological:      Mental Status: He is alert and oriented to person, place, and time.   Psychiatric:         Mood and Affect: Mood normal.         Behavior: Behavior normal.       VITAL SIGNS: 24 HR MIN & MAX LAST    Temp  Min: 98.1 °F (36.7 °C)  Max: 99.7 °F (37.6 °C)  99.7 °F (37.6 °C)        BP  Min: 119/61  Max: 164/75  (!) 159/76     Pulse  Min: 75  Max: 90  87     Resp  Min: 18  Max: 18  18    SpO2  Min: 88 %  Max: 95 %  (!) 88 %      HT:    WT: 63.5 kg (140 lb)  BMI:       Recent Results (from the past 24  hour(s))   Comprehensive Metabolic Panel    Collection Time: 08/22/24  3:52 AM   Result Value Ref Range    Sodium 137 136 - 145 mmol/L    Potassium 4.2 3.5 - 5.1 mmol/L    Chloride 107 98 - 107 mmol/L    CO2 21 (L) 23 - 31 mmol/L    Glucose 81 (L) 82 - 115 mg/dL    Blood Urea Nitrogen 12.4 8.4 - 25.7 mg/dL    Creatinine 1.04 0.73 - 1.18 mg/dL    Calcium 7.9 (L) 8.8 - 10.0 mg/dL    Protein Total 5.7 (L) 5.8 - 7.6 gm/dL    Albumin 2.0 (L) 3.4 - 4.8 g/dL    Globulin 3.7 (H) 2.4 - 3.5 gm/dL    Albumin/Globulin Ratio 0.5 (L) 1.1 - 2.0 ratio    Bilirubin Total 0.4 <=1.5 mg/dL    ALP 65 40 - 150 unit/L    ALT 9 0 - 55 unit/L    AST 14 5 - 34 unit/L    eGFR >60 mL/min/1.73/m2    Anion Gap 9.0 mEq/L    BUN/Creatinine Ratio 12    CBC with Differential    Collection Time: 08/22/24  3:52 AM   Result Value Ref Range    WBC 8.83 4.50 - 11.50 x10(3)/mcL    RBC 2.53 (L) 4.70 - 6.10 x10(6)/mcL    Hgb 6.4 (L) 14.0 - 18.0 g/dL    Hct 20.9 (L) 42.0 - 52.0 %    MCV 82.6 80.0 - 94.0 fL    MCH 25.3 (L) 27.0 - 31.0 pg    MCHC 30.6 (L) 33.0 - 36.0 g/dL    RDW 18.0 (H) 11.5 - 17.0 %    Platelet 461 (H) 130 - 400 x10(3)/mcL    MPV 9.5 7.4 - 10.4 fL    Neut % 67.7 %    Lymph % 18.3 %    Mono % 12.1 %    Eos % 0.7 %    Basophil % 0.5 %    Lymph # 1.62 0.6 - 4.6 x10(3)/mcL    Neut # 5.98 2.1 - 9.2 x10(3)/mcL    Mono # 1.07 0.1 - 1.3 x10(3)/mcL    Eos # 0.06 0 - 0.9 x10(3)/mcL    Baso # 0.04 <=0.2 x10(3)/mcL    IG# 0.06 (H) 0 - 0.04 x10(3)/mcL    IG% 0.7 %    NRBC% 0.2 %   Reticulocytes    Collection Time: 08/22/24  3:52 AM   Result Value Ref Range    Retic Cnt Auto 1.93 1.1 - 2.1 %    RET# 0.0488 0.026 - 0.095 x10e6/uL   Path Review, Peripheral Smear    Collection Time: 08/22/24  3:52 AM   Result Value Ref Range    Peripheral Smear Evaluation       - Normocytic, hypochromic anemia.  - Thrombocytosis.    Impression: Hypochromic anemia and thrombocytosis can be secondary to iron deficiency. If there is no evidence of iron deficiency, then consider  JAK2 w/reflex testing to rule out neoplastic causes.    Ranjan Carlton M.D.    Iron and TIBC    Collection Time: 08/22/24 10:35 AM   Result Value Ref Range    Iron Binding Capacity Unsaturated 166 69 - 240 ug/dL    Iron Level 13 (L) 65 - 175 ug/dL    Transferrin 170 (L) 174 - 364 mg/dL    Iron Binding Capacity Total 179 (L) 250 - 450 ug/dL    Iron Saturation 7 (L) 20 - 50 %   Vitamin B12    Collection Time: 08/22/24 10:35 AM   Result Value Ref Range    Vitamin B12 >2,000 (H) 213 - 816 pg/mL   Folate    Collection Time: 08/22/24 10:35 AM   Result Value Ref Range    Folate Level 15.0 7.0 - 31.4 ng/mL   Haptoglobin    Collection Time: 08/22/24 10:35 AM   Result Value Ref Range    Haptoglobin 345 40 - 368 mg/dL   Hepatitis Panel, Acute    Collection Time: 08/22/24 10:35 AM   Result Value Ref Range    Hep A IgM Interp Nonreactive Nonreactive    Hep B Core IgM Interp Nonreactive Nonreactive    Hep BsAg Interp Nonreactive Nonreactive    Hep C Ab Interp Reactive (A) Nonreactive   Lactate Dehydrogenase    Collection Time: 08/22/24 10:35 AM   Result Value Ref Range    Lactate Dehydrogenase 252 (H) 125 - 220 U/L       Imaging  8/20/24 CXR   No acute chest disease is identified.     Impression  Gram (-) Sepsis  Candidemia  Gram (-) complicated UTI  ANTON  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  Paraplegia  DM Type II  Anemia  Reactive thrombocytosis  Sacral / L hip non-healing wounds    Recommendations  I agree with the management of Mr Khan. This is a 60 y/o male who presented to ER with fever, cough and body aches. On admit he was febrile with leukocytosis. Blood cultures revealing GNR with Candida albicans and Ecoli detected on BCID. U/A abnormal with urine culture >100k GNR. Wound cultures negative thus far. We will continue Zosyn and D/C Micafungin, place on Diflucan. Follow cultures with plans to adjust antibiotics accordingly. We will repeat blood cultures to assess for clearance. Continue wound  315.9 care. Case discussed with Dr Hutchison as well as with the patient, wife and nursing. Thank you for this consultation, we will continue to follow Mr Khan with you.

## 2024-08-23 NOTE — NURSING
caSubjective:      Patient ID: Bianca Khan is a 59 y.o. male.    Chief Complaint: Fever (Pt to ER via [facility] for fever and HA.  Started yesterday.  Pt was not medicated today. Is bed bound due to paraplegia. )    HPI  Review of Systems   Objective:     Physical Exam   Assessment:     1. Acute onset sepsis    2. Complicated UTI (urinary tract infection)    3. Acute on chronic anemia    4. Symptomatic anemia           Negative Pressure Wound Therapy  05/31/24 (Active)   05/31/24    Side:    Orientation:    Location: Sacral Spine   Additional Comments:    Removal Indication and Assessment:    Location:    SDO Location:    NPWT Type Vacuum Therapy 08/22/24 2000   Therapy Setting NPWT Continuous therapy 08/22/24 2000   Pressure Setting NPWT 125 mmHg 08/22/24 2000   Therapy Interventions NPWT Seal intact;Dressing changed 08/22/24 2000   Sponges Inserted NPWT Black;1 08/22/24 2000            Altered Skin Integrity 01/09/24 0848 upper Sacral spine (Active)   01/09/24 0848   Altered Skin Integrity Present on Admission - Did Patient arrive to the hospital with altered skin?: suspected hospital acquired   Side:    Orientation: upper   Location: Sacral spine   Wound Number:    Is this injury device related?:    Primary Wound Type:    Description of Altered Skin Integrity:    Ankle-Brachial Index:    Pulses:    Removal Indication and Assessment:    Wound Outcome:    (Retired) Wound Length (cm):    (Retired) Wound Width (cm):    (Retired) Depth (cm):    Wound Description (Comments):    Removal Indications:    Wound Image   08/22/24 0958   Description of Altered Skin Integrity Full thickness tissue loss. Base is covered by slough and/or eschar in the wound bed 08/22/24 0958   Dressing Appearance Dry;Intact;Clean 08/22/24 2000   Drainage Amount None 08/22/24 2000   Drainage Characteristics/Odor Malodorous 08/22/24 2000   Appearance Pink;Red;Maroon 08/22/24 2000   Tissue loss description Full thickness 08/22/24 0958   Black (%),  Wound Tissue Color 0 % 08/22/24 0958   Red (%), Wound Tissue Color 90 % 08/22/24 0958   Yellow (%), Wound Tissue Color 10 % 08/22/24 0958   Periwound Area Intact;Dry 08/22/24 0958   Wound Edges Defined 08/22/24 0958   Wound Length (cm) 5.6 cm 08/22/24 0958   Wound Width (cm) 4.4 cm 08/22/24 0958   Wound Depth (cm) 1 cm 08/22/24 0958   Wound Volume (cm^3) 24.64 cm^3 08/22/24 0958   Wound Surface Area (cm^2) 24.64 cm^2 08/22/24 0958   Care Cleansed with:;Sterile normal saline 08/22/24 0958   Dressing Applied;Other (comment) 08/22/24 0958            Wound 05/30/24 0000 Pressure Injury Right Buttocks (Active)   05/30/24 0000   Present on Original Admission:    Primary Wound Type: Pressure inj   Side: Right   Orientation:    Location: Buttocks   Wound Approximate Age at First Assessment (Weeks):    Wound Number:    Is this injury device related?: No   Incision Type:    Closure Method:    Wound Description (Comments):    Type:    Additional Comments:    Ankle-Brachial Index:    Pulses:    Removal Indication and Assessment:    Wound Outcome:    Wound Image   08/22/24 0958   Pressure Injury Stage 4 08/22/24 0958   Dressing Appearance Dry;Intact;Clean;Dried drainage 08/22/24 2000   Drainage Amount Moderate 08/22/24 2000   Drainage Characteristics/Odor Serosanguineous 08/22/24 2000   Appearance Pink;Red;Maroon 08/22/24 2000   Tissue loss description Full thickness 08/22/24 0958   Black (%), Wound Tissue Color 0 % 08/22/24 0958   Red (%), Wound Tissue Color 50 % 08/22/24 0958   Yellow (%), Wound Tissue Color 50 % 08/22/24 0958   Periwound Area Intact;Dry 08/22/24 0958   Wound Edges Defined 08/22/24 0958   Wound Length (cm) 4.5 cm 08/22/24 0958   Wound Width (cm) 4.8 cm 08/22/24 0958   Wound Depth (cm) 1.2 cm 08/22/24 0958   Wound Volume (cm^3) 25.92 cm^3 08/22/24 0958   Wound Surface Area (cm^2) 21.6 cm^2 08/22/24 0958   Care Cleansed with:;Sterile normal saline 08/22/24 2000   Dressing Applied;Other (comment) 08/22/24 0958             Wound 08/22/24 0800 Other (comment) Left Heel (Active)   08/22/24 0800   Present on Original Admission:    Primary Wound Type: Other   Side: Left   Orientation:    Location: Heel   Wound Approximate Age at First Assessment (Weeks):    Wound Number:    Is this injury device related?:    Incision Type:    Closure Method:    Wound Description (Comments):    Type:    Additional Comments:    Ankle-Brachial Index:    Pulses:    Removal Indication and Assessment:    Wound Outcome:    Wound Image   08/22/24 0958   Dressing Appearance Dry;Intact;Clean;Dried drainage 08/22/24 2000   Drainage Amount Small 08/22/24 2000   Drainage Characteristics/Odor Serosanguineous 08/22/24 2000   Appearance Red;Moist;Slough;Black 08/22/24 2000   Tissue loss description Full thickness 08/22/24 0958   Black (%), Wound Tissue Color 10 % 08/22/24 0958   Red (%), Wound Tissue Color 80 % 08/22/24 0958   Yellow (%), Wound Tissue Color 10 % 08/22/24 0958   Periwound Area Intact;Dry 08/22/24 0958   Wound Edges Defined 08/22/24 0958   Wound Length (cm) 2 cm 08/22/24 0958   Wound Width (cm) 2.7 cm 08/22/24 0958   Wound Depth (cm) 0.2 cm 08/22/24 0958   Wound Volume (cm^3) 1.08 cm^3 08/22/24 0958   Wound Surface Area (cm^2) 5.4 cm^2 08/22/24 0958   Care Cleansed with:;Sterile normal saline;Applied: 08/22/24 2000   Dressing Calcium alginate;Silver;Gauze;Rolled gauze;Other (comment) 08/22/24 2000            Wound 08/22/24 0800 Other (comment) Scrotum (Active)   08/22/24 0800   Present on Original Admission:    Primary Wound Type: Other   Side:    Orientation:    Location: Scrotum   Wound Approximate Age at First Assessment (Weeks):    Wound Number:    Is this injury device related?:    Incision Type:    Closure Method:    Wound Description (Comments):    Type:    Additional Comments:    Ankle-Brachial Index:    Pulses:    Removal Indication and Assessment:    Wound Outcome:    Wound Image   08/22/24 0958   Dressing Appearance Open to air 08/22/24  2000   Drainage Amount None 08/22/24 2000   Appearance Pink;Red;Moist 08/22/24 2000   Tissue loss description Partial thickness 08/22/24 0958   Black (%), Wound Tissue Color 0 % 08/22/24 0958   Red (%), Wound Tissue Color 100 % 08/22/24 0958   Yellow (%), Wound Tissue Color 0 % 08/22/24 0958   Periwound Area Intact;Dry 08/22/24 0958   Wound Edges Irregular 08/22/24 0958   Wound Length (cm) 3.2 cm 08/22/24 0958   Wound Width (cm) 0.2 cm 08/22/24 0958   Wound Depth (cm) 0.1 cm 08/22/24 0958   Wound Volume (cm^3) 0.064 cm^3 08/22/24 0958   Wound Surface Area (cm^2) 0.64 cm^2 08/22/24 0958   Care Cleansed with:;Sterile normal saline 08/22/24 2000   Dressing Other (comment) 08/22/24 0958       Plan:          Trouble shooting for wd vac to buttock wds x 2.  Dressing came partially off when pt was placed on bedpan.  Able to clear area dislodged and reseal wd vac.   Spoke with nurse Khalil.   Instructed to put wet to dry dressing in  place if unable to reseal during the weekend.  Will check on Monday.

## 2024-08-23 NOTE — NURSING
Pt suprapubic cath was change today by herson SHELDON around 7 pt c/o  of leaking which it was. I contact  I CONTACT JEANETTE ALMANZAR. SHE TOLD BE TO CONTACT UROLOGY I TRY NOT GETTING AN ANSWER. Explain to night nurse Cheyenne what's going on

## 2024-08-23 NOTE — NURSING
.Nurses Note -- 4 Eyes      8/22/2024   8:29 PM      Skin assessed during: Q Shift Change      [] No Altered Skin Integrity Present    []Prevention Measures Documented      [] Yes- Altered Skin Integrity Present or Discovered   [] LDA Added if Not in Epic (Describe Wound)   [] New Altered Skin Integrity was Present on Admit and Documented in LDA   [] Wound Image Taken    Wound Care Consulted? Yes    Attending Nurse:   MOE Bernabe RN/Staff Member:   Lori
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
## 2024-08-24 LAB
ABO + RH BLD: NORMAL
ABO + RH BLD: NORMAL
ANION GAP SERPL CALC-SCNC: 12 MEQ/L
BACTERIA WND CULT: ABNORMAL
BASOPHILS # BLD AUTO: 0.03 X10(3)/MCL
BASOPHILS NFR BLD AUTO: 0.3 %
BLD PROD TYP BPU: NORMAL
BLD PROD TYP BPU: NORMAL
BLOOD UNIT EXPIRATION DATE: NORMAL
BLOOD UNIT EXPIRATION DATE: NORMAL
BLOOD UNIT TYPE CODE: 5100
BLOOD UNIT TYPE CODE: 5100
BUN SERPL-MCNC: 5.6 MG/DL (ref 8.4–25.7)
CALCIUM SERPL-MCNC: 8.9 MG/DL (ref 8.8–10)
CHLORIDE SERPL-SCNC: 107 MMOL/L (ref 98–107)
CO2 SERPL-SCNC: 24 MMOL/L (ref 23–31)
CREAT SERPL-MCNC: 0.81 MG/DL (ref 0.73–1.18)
CREAT/UREA NIT SERPL: 7
CROSSMATCH INTERPRETATION: NORMAL
CROSSMATCH INTERPRETATION: NORMAL
DISPENSE STATUS: NORMAL
DISPENSE STATUS: NORMAL
EOSINOPHIL # BLD AUTO: 0.1 X10(3)/MCL (ref 0–0.9)
EOSINOPHIL NFR BLD AUTO: 1 %
ERYTHROCYTE [DISTWIDTH] IN BLOOD BY AUTOMATED COUNT: 17.2 % (ref 11.5–17)
GFR SERPLBLD CREATININE-BSD FMLA CKD-EPI: >60 ML/MIN/1.73/M2
GLUCOSE SERPL-MCNC: 75 MG/DL (ref 82–115)
HCT VFR BLD AUTO: 33.6 % (ref 42–52)
HGB BLD-MCNC: 10.7 G/DL (ref 14–18)
IMM GRANULOCYTES # BLD AUTO: 0.03 X10(3)/MCL (ref 0–0.04)
IMM GRANULOCYTES NFR BLD AUTO: 0.3 %
LYMPHOCYTES # BLD AUTO: 1.84 X10(3)/MCL (ref 0.6–4.6)
LYMPHOCYTES NFR BLD AUTO: 19.1 %
MCH RBC QN AUTO: 25.7 PG (ref 27–31)
MCHC RBC AUTO-ENTMCNC: 31.8 G/DL (ref 33–36)
MCV RBC AUTO: 80.6 FL (ref 80–94)
MONOCYTES # BLD AUTO: 0.93 X10(3)/MCL (ref 0.1–1.3)
MONOCYTES NFR BLD AUTO: 9.7 %
NEUTROPHILS # BLD AUTO: 6.68 X10(3)/MCL (ref 2.1–9.2)
NEUTROPHILS NFR BLD AUTO: 69.6 %
NRBC BLD AUTO-RTO: 0 %
PLATELET # BLD AUTO: 537 X10(3)/MCL (ref 130–400)
PMV BLD AUTO: 9.3 FL (ref 7.4–10.4)
POTASSIUM SERPL-SCNC: 3.8 MMOL/L (ref 3.5–5.1)
RBC # BLD AUTO: 4.17 X10(6)/MCL (ref 4.7–6.1)
SODIUM SERPL-SCNC: 143 MMOL/L (ref 136–145)
UNIT NUMBER: NORMAL
UNIT NUMBER: NORMAL
WBC # BLD AUTO: 9.61 X10(3)/MCL (ref 4.5–11.5)

## 2024-08-24 PROCEDURE — 25000003 PHARM REV CODE 250: Performed by: INTERNAL MEDICINE

## 2024-08-24 PROCEDURE — 80048 BASIC METABOLIC PNL TOTAL CA: CPT | Performed by: INTERNAL MEDICINE

## 2024-08-24 PROCEDURE — 63600175 PHARM REV CODE 636 W HCPCS: Performed by: INTERNAL MEDICINE

## 2024-08-24 PROCEDURE — 63600175 PHARM REV CODE 636 W HCPCS: Performed by: NURSE PRACTITIONER

## 2024-08-24 PROCEDURE — 36415 COLL VENOUS BLD VENIPUNCTURE: CPT | Performed by: INTERNAL MEDICINE

## 2024-08-24 PROCEDURE — 25000003 PHARM REV CODE 250: Performed by: NURSE PRACTITIONER

## 2024-08-24 PROCEDURE — 85025 COMPLETE CBC W/AUTO DIFF WBC: CPT | Performed by: INTERNAL MEDICINE

## 2024-08-24 PROCEDURE — 21400001 HC TELEMETRY ROOM

## 2024-08-24 PROCEDURE — 11000001 HC ACUTE MED/SURG PRIVATE ROOM

## 2024-08-24 RX ORDER — CLONIDINE HYDROCHLORIDE 0.1 MG/1
0.1 TABLET ORAL 3 TIMES DAILY
Status: DISCONTINUED | OUTPATIENT
Start: 2024-08-24 | End: 2024-08-30 | Stop reason: HOSPADM

## 2024-08-24 RX ORDER — AMLODIPINE BESYLATE 5 MG/1
10 TABLET ORAL DAILY
Status: DISCONTINUED | OUTPATIENT
Start: 2024-08-24 | End: 2024-08-30 | Stop reason: HOSPADM

## 2024-08-24 RX ORDER — CARVEDILOL 12.5 MG/1
25 TABLET ORAL 2 TIMES DAILY WITH MEALS
Status: DISCONTINUED | OUTPATIENT
Start: 2024-08-24 | End: 2024-08-30 | Stop reason: HOSPADM

## 2024-08-24 RX ADMIN — FLUCONAZOLE 400 MG: 2 INJECTION, SOLUTION INTRAVENOUS at 06:08

## 2024-08-24 RX ADMIN — TRAZODONE HYDROCHLORIDE 200 MG: 100 TABLET ORAL at 09:08

## 2024-08-24 RX ADMIN — DOXYCYCLINE HYCLATE 100 MG: 100 TABLET, COATED ORAL at 09:08

## 2024-08-24 RX ADMIN — OXYCODONE HYDROCHLORIDE AND ACETAMINOPHEN 1 TABLET: 10; 325 TABLET ORAL at 02:08

## 2024-08-24 RX ADMIN — MEROPENEM 1 G: 1 INJECTION, POWDER, FOR SOLUTION INTRAVENOUS at 09:08

## 2024-08-24 RX ADMIN — OXYBUTYNIN CHLORIDE 5 MG: 5 TABLET ORAL at 08:08

## 2024-08-24 RX ADMIN — OXYCODONE HYDROCHLORIDE AND ACETAMINOPHEN 1 TABLET: 10; 325 TABLET ORAL at 05:08

## 2024-08-24 RX ADMIN — FUROSEMIDE 20 MG: 10 INJECTION, SOLUTION INTRAMUSCULAR; INTRAVENOUS at 08:08

## 2024-08-24 RX ADMIN — GABAPENTIN 300 MG: 300 CAPSULE ORAL at 09:08

## 2024-08-24 RX ADMIN — OXYCODONE HYDROCHLORIDE AND ACETAMINOPHEN 1 TABLET: 10; 325 TABLET ORAL at 12:08

## 2024-08-24 RX ADMIN — OXYBUTYNIN CHLORIDE 5 MG: 5 TABLET ORAL at 02:08

## 2024-08-24 RX ADMIN — METHOCARBAMOL 750 MG: 750 TABLET ORAL at 08:08

## 2024-08-24 RX ADMIN — FLUOXETINE HYDROCHLORIDE 20 MG: 20 CAPSULE ORAL at 08:08

## 2024-08-24 RX ADMIN — OXYCODONE HYDROCHLORIDE AND ACETAMINOPHEN 1 TABLET: 10; 325 TABLET ORAL at 10:08

## 2024-08-24 RX ADMIN — OXYBUTYNIN CHLORIDE 5 MG: 5 TABLET ORAL at 09:08

## 2024-08-24 RX ADMIN — FAMOTIDINE 20 MG: 20 TABLET, FILM COATED ORAL at 08:08

## 2024-08-24 RX ADMIN — AMLODIPINE BESYLATE 10 MG: 5 TABLET ORAL at 09:08

## 2024-08-24 RX ADMIN — MICONAZOLE NITRATE 2 % TOPICAL POWDER: at 08:08

## 2024-08-24 RX ADMIN — MEROPENEM 1 G: 1 INJECTION, POWDER, FOR SOLUTION INTRAVENOUS at 02:08

## 2024-08-24 RX ADMIN — HYDROXYZINE PAMOATE 50 MG: 50 CAPSULE ORAL at 09:08

## 2024-08-24 RX ADMIN — MEROPENEM 1 G: 1 INJECTION, POWDER, FOR SOLUTION INTRAVENOUS at 05:08

## 2024-08-24 RX ADMIN — MICONAZOLE NITRATE 2 % TOPICAL POWDER: at 09:08

## 2024-08-24 RX ADMIN — CLONIDINE HYDROCHLORIDE 0.1 MG: 0.1 TABLET ORAL at 02:08

## 2024-08-24 RX ADMIN — GABAPENTIN 300 MG: 300 CAPSULE ORAL at 02:08

## 2024-08-24 RX ADMIN — HYDRALAZINE HYDROCHLORIDE 10 MG: 20 INJECTION INTRAMUSCULAR; INTRAVENOUS at 12:08

## 2024-08-24 RX ADMIN — OXYCODONE HYDROCHLORIDE AND ACETAMINOPHEN 1 TABLET: 10; 325 TABLET ORAL at 09:08

## 2024-08-24 RX ADMIN — CLONIDINE HYDROCHLORIDE 0.1 MG: 0.1 TABLET ORAL at 09:08

## 2024-08-24 RX ADMIN — FUROSEMIDE 20 MG: 10 INJECTION, SOLUTION INTRAMUSCULAR; INTRAVENOUS at 09:08

## 2024-08-24 RX ADMIN — CARVEDILOL 25 MG: 12.5 TABLET, FILM COATED ORAL at 04:08

## 2024-08-24 RX ADMIN — METHOCARBAMOL 750 MG: 750 TABLET ORAL at 09:08

## 2024-08-24 RX ADMIN — TOBRAMYCIN SULFATE 445 MG: 40 INJECTION, SOLUTION INTRAMUSCULAR; INTRAVENOUS at 01:08

## 2024-08-24 RX ADMIN — ATORVASTATIN CALCIUM 20 MG: 10 TABLET, FILM COATED ORAL at 09:08

## 2024-08-24 RX ADMIN — CARVEDILOL 25 MG: 12.5 TABLET, FILM COATED ORAL at 09:08

## 2024-08-24 RX ADMIN — GABAPENTIN 300 MG: 300 CAPSULE ORAL at 08:08

## 2024-08-24 RX ADMIN — DOXYCYCLINE HYCLATE 100 MG: 100 TABLET, COATED ORAL at 08:08

## 2024-08-24 NOTE — NURSING
Nurses Note -- 4 Eyes      8/24/2024   11:11 AM      Skin assessed during: Q Shift Change      [] No Altered Skin Integrity Present    []Prevention Measures Documented      [x] Yes- Altered Skin Integrity Present or Discovered   [] LDA Added if Not in Epic (Describe Wound)   [] New Altered Skin Integrity was Present on Admit and Documented in LDA   [] Wound Image Taken    Wound Care Consulted? Yes    Attending Nurse:  Remi Bernabe RN/Staff Member:   Carlita Arellano

## 2024-08-24 NOTE — PLAN OF CARE
Problem: Adult Inpatient Plan of Care  Goal: Plan of Care Review  Outcome: Progressing  Goal: Patient-Specific Goal (Individualized)  Outcome: Progressing  Goal: Absence of Hospital-Acquired Illness or Injury  Outcome: Progressing  Goal: Optimal Comfort and Wellbeing  Outcome: Progressing  Goal: Readiness for Transition of Care  Outcome: Progressing     Problem: Wound  Goal: Optimal Coping  Outcome: Progressing  Goal: Optimal Functional Ability  Outcome: Progressing  Goal: Absence of Infection Signs and Symptoms  Outcome: Progressing  Goal: Improved Oral Intake  Outcome: Progressing  Goal: Optimal Pain Control and Function  Outcome: Progressing  Goal: Skin Health and Integrity  Outcome: Progressing  Goal: Optimal Wound Healing  Outcome: Progressing     Problem: Sepsis/Septic Shock  Goal: Optimal Coping  Outcome: Progressing  Goal: Absence of Bleeding  Outcome: Progressing  Goal: Blood Glucose Level Within Targeted Range  Outcome: Progressing  Goal: Absence of Infection Signs and Symptoms  Outcome: Progressing  Goal: Optimal Nutrition Intake  Outcome: Progressing     Problem: Diabetes Comorbidity  Goal: Blood Glucose Level Within Targeted Range  Outcome: Progressing

## 2024-08-24 NOTE — PROGRESS NOTES
Infectious Diseases Progress Note  60 y/o male with past medical history of T-spine cord injury with paraplegia, diabetes, HTN, hepatitis-C, chronic MRSA L ankle wound/osteomyelitis, was on suppressive doxycycline and R knee infection/septic arthritis and osteomyelitis with GBS/Enterococcus and Ecoli isolates who presented to ER on 8/20 with fevers, cough and body aches. On admit he was noted to be febrile with leukocytosis, WBC 17.25. Blood cultures revealed ESLB Ecoli with Ecoli and Candida Albicans detected on BCID. U/A with 1+ nitrites, 500 LE, 50-99 RBC, >100 WBC, occasional bacteria, budding yeast. Urine culture >100k ESBL Ecoli. Wound culture revealing GNR, 2 isolates.  and .6. MRSA/MSSA PCR (-). CXR unremarkable.   He is currently on Merrem and Micafungin    Subjective:  Lying in bed, no acute distress. No new complaints voiced. Low grade temps. Wife present     ROS  Constitutional:  Positive for fever and malaise/fatigue.   HENT: Negative.     Eyes: Negative.    Respiratory: Negative.     Cardiovascular: Negative.    Gastrointestinal: Negative.    Musculoskeletal: Negative.    Skin:         Multiple wounds   Neurological:  Positive for focal weakness and weakness.   All other systems reviewed and are negative.    Review of patient's allergies indicates:   Allergen Reactions    Baclofen Itching and Anxiety       Past Medical History:   Diagnosis Date    Arthritis     Chronic ulcer of ankle 05/26/2022    Frequent UTI 07/02/2019    Generalized anxiety disorder 05/26/2022    Neurogenic bladder 05/26/2022    Osteomyelitis 05/26/2022    Presence of suprapubic catheter 05/26/2022    Pure hypercholesterolemia 05/26/2022    Retention of urine, unspecified 08/09/2019    Spinal cord injury at T1-T6 level 04/20/2018       Past Surgical History:   Procedure Laterality Date    FRACTURE SURGERY  2021    INCISION AND DRAINAGE, LOWER EXTREMITY Right 06/29/2023    Procedure: INCISION AND DRAINAGE, LOWER  EXTREMITY;  Surgeon: Prabhu Shen DO;  Location: [facility] OR;  Service: Orthopedics;  Laterality: Right;  supine bone foam wash stuff cultures    INCISION AND DRAINAGE, LOWER EXTREMITY Right 2023    Procedure: INCISION AND DRAINAGE, LOWER EXTREMITY;  Surgeon: Prabhu Shen DO;  Location: [facility] OR;  Service: Orthopedics;  Laterality: Right;  supine any table bone foam wash stuff possible wound vac    INCISION AND DRAINAGE, LOWER EXTREMITY Right 2023    Procedure: INCISION AND DRAINAGE, LOWER EXTREMITY;  Surgeon: Prabhu Shen DO;  Location: [facility] OR;  Service: Orthopedics;  Laterality: Right;  supine bone foam vascular bed removal of distal femoral plate, wash stuff, possible AKA    INSERTION OF INTRAMEDULLARY MARISA Right 08/10/2022    Procedure: INSERTION, INTRAMEDULLARY MARISA RIGHT TIBIA;  Surgeon: Jorge Orellana MD;  Location: [facility] OR;  Service: Orthopedics;  Laterality: Right;    JOINT REPLACEMENT      Ankel    REMOVAL OF HARDWARE FROM LOWER EXTREMITY Right 2023    Procedure: REMOVAL, HARDWARE, LOWER EXTREMITY;  Surgeon: Prabhu Shen DO;  Location: [facility] OR;  Service: Orthopedics;  Laterality: Right;    SPINE SURGERY  2018       Social History     Socioeconomic History    Marital status:    Tobacco Use    Smoking status: Some Days     Current packs/day: 0.00     Average packs/day: 0.2 packs/day for 41.5 years (10.4 ttl pk-yrs)     Types: Cigars, Cigarettes     Start date: 1982     Last attempt to quit: 2023     Years since quittin.0    Smokeless tobacco: Never   Substance and Sexual Activity    Alcohol use: Not Currently     Alcohol/week: 2.0 standard drinks of alcohol     Types: 2 Cans of beer per week    Drug use: Not Currently     Types: Oxycodone    Sexual activity: Not Currently     Partners: Female     Birth control/protection: None     Social Determinants of Health     Financial Resource Strain: Low Risk  (2023)    Overall Financial Resource Strain  (CARDIA)     Difficulty of Paying Living Expenses: Not very hard   Food Insecurity: No Food Insecurity (12/11/2023)    Hunger Vital Sign     Worried About Running Out of Food in the Last Year: Never true     Ran Out of Food in the Last Year: Never true   Transportation Needs: No Transportation Needs (12/11/2023)    PRAPARE - Transportation     Lack of Transportation (Medical): No     Lack of Transportation (Non-Medical): No   Physical Activity: Inactive (12/11/2023)    Exercise Vital Sign     Days of Exercise per Week: 0 days     Minutes of Exercise per Session: 0 min   Stress: No Stress Concern Present (12/11/2023)    [employer] - Occupational Stress Questionnaire     Feeling of Stress : Only a little   Housing Stability: Low Risk  (12/11/2023)    Housing Stability Vital Sign     Unable to Pay for Housing in the Last Year: No     Number of Places Lived in the Last Year: 1     Unstable Housing in the Last Year: No         Scheduled Meds:   atorvastatin  20 mg Oral Nightly    doxycycline  100 mg Oral Q12H    famotidine  20 mg Oral Daily    fluconazole (DIFLUCAN) IV (PEDS and ADULTS)  400 mg Intravenous Q24H    FLUoxetine  20 mg Oral Daily    furosemide (LASIX) injection  20 mg Intravenous Q12H    gabapentin  300 mg Oral TID    meropenem IV (PEDS and ADULTS)  1 g Intravenous Q8H    methocarbamoL  750 mg Oral BID    miconazole NITRATE 2 %   Topical (Top) BID    oxybutynin  5 mg Oral TID     Continuous Infusions:  PRN Meds:  Current Facility-Administered Medications:     0.9%  NaCl infusion (for blood administration), , Intravenous, Q24H PRN    0.9%  NaCl infusion (for blood administration), , Intravenous, Q24H PRN    0.9%  NaCl infusion (for blood administration), , Intravenous, Q24H PRN    acetaminophen, 650 mg, Oral, Q8H PRN    acetaminophen, 650 mg, Oral, Q4H PRN    dextromethorphan-guaiFENesin  mg/5 ml, 5 mL, Oral, Q4H PRN    dextrose 10%, 12.5 g, Intravenous, PRN    dextrose  10%, 25 g, Intravenous, PRN    glucagon (human recombinant), 1 mg, Intramuscular, PRN    glucose, 16 g, Oral, PRN    glucose, 24 g, Oral, PRN    hydrALAZINE, 10 mg, Intravenous, Q4H PRN    hydrOXYzine pamoate, 50 mg, Oral, Nightly PRN    ondansetron, 4 mg, Intravenous, Q8H PRN    oxyCODONE-acetaminophen, 1 tablet, Oral, Q4H PRN    traZODone, 200 mg, Oral, Nightly PRN    Objective:  BP (!) 165/76   Pulse 83   Temp 98.2 °F (36.8 °C) (Oral)   Resp 18   Wt 63.5 kg (140 lb)   SpO2 (!) 86%   BMI 20.09 kg/m²     Physical Exam:   Physical Exam  Vitals reviewed.   Constitutional:       Appearance: He is ill-appearing.   HENT:      Head: Normocephalic.   Cardiovascular:      Rate and Rhythm: Normal rate and regular rhythm.   Pulmonary:      Effort: Pulmonary effort is normal. No respiratory distress.      Breath sounds: Normal breath sounds. No wheezing.   Abdominal:      General: Bowel sounds are normal. There is no distension.      Palpations: Abdomen is soft.      Tenderness: There is no abdominal tenderness.   Musculoskeletal:      Cervical back: Normal range of motion.      Comments: Paraplegia   Skin:     Findings: No rash.      Comments: Wounds dressed, wound vac in place   Neurological:      Mental Status: He is alert and oriented to person, place, and time.   Psychiatric:         Mood and Affect: Mood normal.         Behavior: Behavior normal.     Imaging  8/23/24 CTA chest with  No pulmonary embolism identified, bilateral moderate to large volume pleural effusions, bilateral lower lung zones compressive atelectasis without exclusion of associated consolidations.     8/23/24 CXR  Increase interstitial and pulmonary vascular markings indicating some degree of pulmonary vascular congestion and cardiac decompensation.     Lab Review   Recent Results (from the past 24 hour(s))   Basic Metabolic Panel    Collection Time: 08/23/24  7:31 AM   Result Value Ref Range    Sodium 136 136 - 145 mmol/L    Potassium 4.1 3.5 -  5.1 mmol/L    Chloride 108 (H) 98 - 107 mmol/L    CO2 21 (L) 23 - 31 mmol/L    Glucose 87 82 - 115 mg/dL    Blood Urea Nitrogen 7.7 (L) 8.4 - 25.7 mg/dL    Creatinine 0.85 0.73 - 1.18 mg/dL    BUN/Creatinine Ratio 9     Calcium 8.1 (L) 8.8 - 10.0 mg/dL    Anion Gap 7.0 mEq/L    eGFR >60 mL/min/1.73/m2   CBC with Differential    Collection Time: 08/23/24  7:31 AM   Result Value Ref Range    WBC 7.04 4.50 - 11.50 x10(3)/mcL    RBC 3.62 (L) 4.70 - 6.10 x10(6)/mcL    Hgb 9.3 (L) 14.0 - 18.0 g/dL    Hct 29.5 (L) 42.0 - 52.0 %    MCV 81.5 80.0 - 94.0 fL    MCH 25.7 (L) 27.0 - 31.0 pg    MCHC 31.5 (L) 33.0 - 36.0 g/dL    RDW 17.1 (H) 11.5 - 17.0 %    Platelet 474 (H) 130 - 400 x10(3)/mcL    MPV 9.5 7.4 - 10.4 fL    Neut % 58.0 %    Lymph % 23.4 %    Mono % 16.2 %    Eos % 1.4 %    Basophil % 0.6 %    Lymph # 1.65 0.6 - 4.6 x10(3)/mcL    Neut # 4.08 2.1 - 9.2 x10(3)/mcL    Mono # 1.14 0.1 - 1.3 x10(3)/mcL    Eos # 0.10 0 - 0.9 x10(3)/mcL    Baso # 0.04 <=0.2 x10(3)/mcL    IG# 0.03 0 - 0.04 x10(3)/mcL    IG% 0.4 %    NRBC% 0.3 %   D-Dimer, Quantitative    Collection Time: 08/23/24 10:26 AM   Result Value Ref Range    D-Dimer 3.29 (H) 0.00 - 0.50 ug/mL FEU       Assessment/Plan:  ESBL Ecoli Sepsis  Candidemia  ESBL Ecoli complicated UTI  ANTON  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  Paraplegia  DM Type II  Anemia  Reactive thrombocytosis  Sacral / L hip wound infection / abscess - GNR isolates  B/L pleural effusions    -Continue Merrem #1 and Diflucan #3  -Plan a 14 day course with end date 9/6  -Low grade temps without leukocytosis  -U/A abnormal with urine culture >100k ESBL Ecoli  -Blood cultures with ESBL Ecoli 2/2 sets with Ecoli and Candida Albicans detected on BCID  -Repeat blood cultures negative thus far  -Sacral wound cultures revealing GNR, 2 isolates and yeast, follow  -Continue wound care  -CXR and CTA Chest with B/L pleural effusions. Started on Lasix per primary  -Remains on chronic suppressive  antibiotic therapy with Doxycycline for MRSA L ankle osteomyelitis with retained hardware  -Discussed with patient, wife and nursing

## 2024-08-24 NOTE — NURSING
Nurses Note -- 4 Eyes      8/23/2024   7:50 PM      Skin assessed during: Q Shift Change      [] No Altered Skin Integrity Present    []Prevention Measures Documented      [x] Yes- Altered Skin Integrity Present or Discovered   [] LDA Added if Not in Epic (Describe Wound)   [] New Altered Skin Integrity was Present on Admit and Documented in LDA   [] Wound Image Taken    Wound Care Consulted? Yes    Attending Nurse:  Carlita Bernabe RN/Staff Member:  Remi Connolly

## 2024-08-24 NOTE — PROGRESS NOTES
Infectious Diseases Progress Note  58 y/o male with past medical history of T-spine cord injury with paraplegia, diabetes, HTN, hepatitis-C, chronic MRSA L ankle wound/osteomyelitis, was on suppressive doxycycline and R knee infection/septic arthritis and osteomyelitis with GBS/Enterococcus and Ecoli isolates who presented to ER on 8/20 with fevers, cough and body aches. On admit he was noted to be febrile with leukocytosis, WBC 17.25. Blood cultures revealed ESLB Ecoli with Ecoli and Candida Albicans detected on BCID. U/A with 1+ nitrites, 500 LE, 50-99 RBC, >100 WBC, occasional bacteria, budding yeast. Urine culture >100k ESBL Ecoli. Wound culture revealing ESBL Ecoli, CR Pseudomonas and Trichosporon isolates.  and .6. MRSA/MSSA PCR (-). CXR unremarkable.   He is currently on Merrem and Diflucan    Subjective:  Lying in bed, no acute distress. No new complaints voiced. Afebrile. Wife present     ROS  Constitutional:  Positive for malaise/fatigue.   HENT: Negative.     Eyes: Negative.    Respiratory: Negative.     Cardiovascular: Negative.    Gastrointestinal: Negative.    Musculoskeletal: Negative.    Skin:         Multiple wounds   Neurological:  Positive for focal weakness and weakness.   All other systems reviewed and are negative.    Review of patient's allergies indicates:   Allergen Reactions    Baclofen Itching and Anxiety       Past Medical History:   Diagnosis Date    Arthritis     Chronic ulcer of ankle 05/26/2022    Frequent UTI 07/02/2019    Generalized anxiety disorder 05/26/2022    Neurogenic bladder 05/26/2022    Osteomyelitis 05/26/2022    Presence of suprapubic catheter 05/26/2022    Pure hypercholesterolemia 05/26/2022    Retention of urine, unspecified 08/09/2019    Spinal cord injury at T1-T6 level 04/20/2018       Past Surgical History:   Procedure Laterality Date    FRACTURE SURGERY  2021    INCISION AND DRAINAGE, LOWER EXTREMITY Right 06/29/2023    Procedure: INCISION AND  DRAINAGE, LOWER EXTREMITY;  Surgeon: Prabhu Shen DO;  Location: [facility] OR;  Service: Orthopedics;  Laterality: Right;  supine bone foam wash stuff cultures    INCISION AND DRAINAGE, LOWER EXTREMITY Right 2023    Procedure: INCISION AND DRAINAGE, LOWER EXTREMITY;  Surgeon: Prabhu Shen DO;  Location: [facility] OR;  Service: Orthopedics;  Laterality: Right;  supine any table bone foam wash stuff possible wound vac    INCISION AND DRAINAGE, LOWER EXTREMITY Right 2023    Procedure: INCISION AND DRAINAGE, LOWER EXTREMITY;  Surgeon: Prabhu Shen DO;  Location: [facility] OR;  Service: Orthopedics;  Laterality: Right;  supine bone foam vascular bed removal of distal femoral plate, wash stuff, possible AKA    INSERTION OF INTRAMEDULLARY MARISA Right 08/10/2022    Procedure: INSERTION, INTRAMEDULLARY MARISA RIGHT TIBIA;  Surgeon: Jorge Orellana MD;  Location: [facility] OR;  Service: Orthopedics;  Laterality: Right;    JOINT REPLACEMENT      Ankel    REMOVAL OF HARDWARE FROM LOWER EXTREMITY Right 2023    Procedure: REMOVAL, HARDWARE, LOWER EXTREMITY;  Surgeon: Prabhu Shen DO;  Location: [facility] OR;  Service: Orthopedics;  Laterality: Right;    SPINE SURGERY  2018       Social History     Socioeconomic History    Marital status:    Tobacco Use    Smoking status: Some Days     Current packs/day: 0.00     Average packs/day: 0.2 packs/day for 41.5 years (10.4 ttl pk-yrs)     Types: Cigars, Cigarettes     Start date: 1982     Last attempt to quit: 2023     Years since quittin.0    Smokeless tobacco: Never   Substance and Sexual Activity    Alcohol use: Not Currently     Alcohol/week: 2.0 standard drinks of alcohol     Types: 2 Cans of beer per week    Drug use: Not Currently     Types: Oxycodone    Sexual activity: Not Currently     Partners: Female     Birth control/protection: None     Social Determinants of Health     Financial Resource Strain: Low Risk  (2023)    Overall Financial  Resource Strain (CARDIA)     Difficulty of Paying Living Expenses: Not very hard   Food Insecurity: No Food Insecurity (12/11/2023)    Hunger Vital Sign     Worried About Running Out of Food in the Last Year: Never true     Ran Out of Food in the Last Year: Never true   Transportation Needs: No Transportation Needs (12/11/2023)    PRAPARE - Transportation     Lack of Transportation (Medical): No     Lack of Transportation (Non-Medical): No   Physical Activity: Inactive (12/11/2023)    Exercise Vital Sign     Days of Exercise per Week: 0 days     Minutes of Exercise per Session: 0 min   Stress: No Stress Concern Present (12/11/2023)    [employer] - Occupational Stress Questionnaire     Feeling of Stress : Only a little   Housing Stability: Low Risk  (12/11/2023)    Housing Stability Vital Sign     Unable to Pay for Housing in the Last Year: No     Number of Places Lived in the Last Year: 1     Unstable Housing in the Last Year: No         Scheduled Meds:   amLODIPine  10 mg Oral Daily    atorvastatin  20 mg Oral Nightly    carvediloL  25 mg Oral BID WM    cloNIDine  0.1 mg Oral TID    doxycycline  100 mg Oral Q12H    famotidine  20 mg Oral Daily    fluconazole (DIFLUCAN) IV (PEDS and ADULTS)  400 mg Intravenous Q24H    FLUoxetine  20 mg Oral Daily    furosemide (LASIX) injection  20 mg Intravenous Q12H    gabapentin  300 mg Oral TID    meropenem IV (PEDS and ADULTS)  1 g Intravenous Q8H    methocarbamoL  750 mg Oral BID    miconazole NITRATE 2 %   Topical (Top) BID    oxybutynin  5 mg Oral TID     Continuous Infusions:  PRN Meds:  Current Facility-Administered Medications:     0.9%  NaCl infusion (for blood administration), , Intravenous, Q24H PRN    0.9%  NaCl infusion (for blood administration), , Intravenous, Q24H PRN    0.9%  NaCl infusion (for blood administration), , Intravenous, Q24H PRN    acetaminophen, 650 mg, Oral, Q8H PRN    acetaminophen, 650 mg, Oral, Q4H PRN     dextromethorphan-guaiFENesin  mg/5 ml, 5 mL, Oral, Q4H PRN    dextrose 10%, 12.5 g, Intravenous, PRN    dextrose 10%, 25 g, Intravenous, PRN    glucagon (human recombinant), 1 mg, Intramuscular, PRN    glucose, 16 g, Oral, PRN    glucose, 24 g, Oral, PRN    hydrALAZINE, 10 mg, Intravenous, Q4H PRN    hydrOXYzine pamoate, 50 mg, Oral, Nightly PRN    ondansetron, 4 mg, Intravenous, Q8H PRN    oxyCODONE-acetaminophen, 1 tablet, Oral, Q4H PRN    tobramycin - pharmacy to dose, , Intravenous, pharmacy to manage frequency    traZODone, 200 mg, Oral, Nightly PRN    Objective:  BP (!) 145/76   Pulse 78   Temp 98 °F (36.7 °C) (Oral)   Resp 18   Wt 63.5 kg (140 lb)   SpO2 (!) 89%   BMI 20.09 kg/m²     Physical Exam:   Physical Exam  Vitals reviewed.   Constitutional:       Appearance: He is ill-appearing.   HENT:      Head: Normocephalic.   Cardiovascular:      Rate and Rhythm: Normal rate and regular rhythm.   Pulmonary:      Effort: Pulmonary effort is normal. No respiratory distress.      Breath sounds: Normal breath sounds. No wheezing.   Abdominal:      General: Bowel sounds are normal. There is no distension.      Palpations: Abdomen is soft.      Tenderness: There is no abdominal tenderness.   Musculoskeletal:      Cervical back: Normal range of motion.      Comments: Paraplegia   Skin:     Findings: No rash.      Comments: Wounds dressed, wound vac in place   Neurological:      Mental Status: He is alert and oriented to person, place, and time.   Psychiatric:         Mood and Affect: Mood normal.         Behavior: Behavior normal.     Imaging    Lab Review   Recent Results (from the past 24 hour(s))   Basic Metabolic Panel    Collection Time: 08/24/24  4:41 AM   Result Value Ref Range    Sodium 143 136 - 145 mmol/L    Potassium 3.8 3.5 - 5.1 mmol/L    Chloride 107 98 - 107 mmol/L    CO2 24 23 - 31 mmol/L    Glucose 75 (L) 82 - 115 mg/dL    Blood Urea Nitrogen 5.6 (L) 8.4 - 25.7 mg/dL    Creatinine 0.81 0.73  - 1.18 mg/dL    BUN/Creatinine Ratio 7     Calcium 8.9 8.8 - 10.0 mg/dL    Anion Gap 12.0 mEq/L    eGFR >60 mL/min/1.73/m2   CBC with Differential    Collection Time: 08/24/24  4:41 AM   Result Value Ref Range    WBC 9.61 4.50 - 11.50 x10(3)/mcL    RBC 4.17 (L) 4.70 - 6.10 x10(6)/mcL    Hgb 10.7 (L) 14.0 - 18.0 g/dL    Hct 33.6 (L) 42.0 - 52.0 %    MCV 80.6 80.0 - 94.0 fL    MCH 25.7 (L) 27.0 - 31.0 pg    MCHC 31.8 (L) 33.0 - 36.0 g/dL    RDW 17.2 (H) 11.5 - 17.0 %    Platelet 537 (H) 130 - 400 x10(3)/mcL    MPV 9.3 7.4 - 10.4 fL    Neut % 69.6 %    Lymph % 19.1 %    Mono % 9.7 %    Eos % 1.0 %    Basophil % 0.3 %    Lymph # 1.84 0.6 - 4.6 x10(3)/mcL    Neut # 6.68 2.1 - 9.2 x10(3)/mcL    Mono # 0.93 0.1 - 1.3 x10(3)/mcL    Eos # 0.10 0 - 0.9 x10(3)/mcL    Baso # 0.03 <=0.2 x10(3)/mcL    IG# 0.03 0 - 0.04 x10(3)/mcL    IG% 0.3 %    NRBC% 0.0 %   Echo    Collection Time: 08/24/24  1:29 PM   Result Value Ref Range    De La Cruz's Biplane MOD Ejection Fraction 67 %    A2C EF 62 %    A4C EF 71 %    LVOT stroke volume 63.54 cm3    LVIDd 4.20 3.5 - 6.0 cm    LV Systolic Volume 27.00 mL    LVIDs 2.70 2.1 - 4.0 cm    LV ESV A2C 41.20 mL    LV Diastolic Volume 78.60 mL    LV ESV A4C 37.80 mL    LV EDV A2C [id number] mL    LV EDV A4C 92.20 mL    Left Ventricular End Systolic Volume by Teichholz Method 27.00 mL    Left Ventricular End Diastolic Volume by Teichholz Method 78.60 mL    IVS 1.10 0.6 - 1.1 cm    LVOT diameter 2.30 cm    LVOT area 4.2 cm2    FS 36 28 - 44 %    Left Ventricle Relative Wall Thickness 0.48 cm    Posterior Wall 1.00 0.6 - 1.1 cm    LV mass 147.00 g    MV Peak E Jan 0.79 m/s    TDI LATERAL 0.10 m/s    TDI SEPTAL 0.06 m/s    E/E' ratio 9.88 m/s    MV Peak A Jan 0.82 m/s    TR Max Jan 4.19 m/s    E/A ratio 0.96     E wave deceleration time 219.00 msec    LV SEPTAL E/E' RATIO 13.17 m/s    LV LATERAL E/E' RATIO 7.90 m/s    LVOT peak jan 0.85 m/s    Left Ventricular Outflow Tract Mean Velocity 0.54 cm/s     Left Ventricular Outflow Tract Mean Gradient 1.00 mmHg    RV-leon mid d 2.8 cm    RV mid diameter 2.80 cm    TAPSE 1.96 cm    RV/LV Ratio 0.83 cm    LA size 3.70 cm    LA volume (mod) 41.60 cm3    AV mean gradient 3 mmHg    AV peak gradient 5 mmHg    Ao peak blanquita 1.12 m/s    Ao VTI 21.10 cm    LVOT peak VTI 15.30 cm    AV valve area 3.01 cm²    AV Velocity Ratio 0.76     AV index (prosthetic) 0.73     AARTI by Velocity Ratio 3.15 cm²    MV mean gradient 2 mmHg    MV peak gradient 4 mmHg    MV valve area by continuity eq 3.05 cm2    MV VTI 20.8 cm    Triscuspid Valve Regurgitation Peak Gradient 70 mmHg    Mean e' 0.08 m/s    LA area A4C 14.20 cm2    LA area A2C 14.70 cm2    RVDD 3.50 cm    TV resting pulmonary artery pressure 78 mmHg    RV TB RVSP 12 mmHg    Est. RA pres 8 mmHg       Assessment/Plan:  ESBL Ecoli Sepsis  Candidemia  ESBL Ecoli complicated UTI  ANTON  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  Paraplegia  DM Type II  Anemia  Reactive thrombocytosis  Sacral / L hip wound infection / abscess - GNR isolates  B/L pleural effusions    -Continue Merrem #2 and Diflucan #4. Started on Tobramycin #1 per primary  -Plan a 14 day course with end date 9/7  -Afebrile without leukocytosis  -U/A abnormal with urine culture >100k ESBL Ecoli  -Blood cultures with ESBL Ecoli 2/2 sets with Ecoli and Candida Albicans detected on BCID  -Repeat blood cultures negative thus far  -Sacral wound cultures ESBL Ecoli, CR Pseudomonas and Trichosporon  -Continue wound care  -CXR and CTA Chest with B/L pleural effusions. Started on Lasix per primary  -Remains on chronic suppressive antibiotic therapy with Doxycycline for MRSA L ankle osteomyelitis with retained hardware  -Discussed with patient, wife and nursing
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
## 2024-08-25 LAB
ANION GAP SERPL CALC-SCNC: 12 MEQ/L
APICAL FOUR CHAMBER EJECTION FRACTION: 71 %
APICAL TWO CHAMBER EJECTION FRACTION: 62 %
AV INDEX (PROSTH): 0.73
AV MEAN GRADIENT: 3 MMHG
AV PEAK GRADIENT: 5 MMHG
AV VALVE AREA BY VELOCITY RATIO: 3.15 CM²
AV VALVE AREA: 3.01 CM²
AV VELOCITY RATIO: 0.76
BACTERIA BLD CULT: NORMAL
BASOPHILS # BLD AUTO: 0.03 X10(3)/MCL
BASOPHILS NFR BLD AUTO: 0.4 %
BUN SERPL-MCNC: 7 MG/DL (ref 8.4–25.7)
CALCIUM SERPL-MCNC: 8.4 MG/DL (ref 8.8–10)
CHLORIDE SERPL-SCNC: 105 MMOL/L (ref 98–107)
CO2 SERPL-SCNC: 26 MMOL/L (ref 23–31)
CREAT SERPL-MCNC: 0.79 MG/DL (ref 0.73–1.18)
CREAT/UREA NIT SERPL: 9
CV ECHO LV RWT: 0.48 CM
DOP CALC AO PEAK VEL: 1.12 M/S
DOP CALC AO VTI: 21.1 CM
DOP CALC LVOT AREA: 4.2 CM2
DOP CALC LVOT DIAMETER: 2.3 CM
DOP CALC LVOT PEAK VEL: 0.85 M/S
DOP CALC LVOT STROKE VOLUME: 63.54 CM3
DOP CALC MV VTI: 20.8 CM
DOP CALCLVOT PEAK VEL VTI: 15.3 CM
E WAVE DECELERATION TIME: 219 MSEC
E/A RATIO: 0.96
E/E' RATIO: 9.88 M/S
ECHO LV POSTERIOR WALL: 1 CM (ref 0.6–1.1)
EOSINOPHIL # BLD AUTO: 0.17 X10(3)/MCL (ref 0–0.9)
EOSINOPHIL NFR BLD AUTO: 2 %
ERYTHROCYTE [DISTWIDTH] IN BLOOD BY AUTOMATED COUNT: 17.3 % (ref 11.5–17)
FRACTIONAL SHORTENING: 36 % (ref 28–44)
GFR SERPLBLD CREATININE-BSD FMLA CKD-EPI: >60 ML/MIN/1.73/M2
GLUCOSE SERPL-MCNC: 114 MG/DL (ref 82–115)
HCT VFR BLD AUTO: 33.5 % (ref 42–52)
HGB BLD-MCNC: 10.7 G/DL (ref 14–18)
IMM GRANULOCYTES # BLD AUTO: 0.04 X10(3)/MCL (ref 0–0.04)
IMM GRANULOCYTES NFR BLD AUTO: 0.5 %
INTERVENTRICULAR SEPTUM: 1.1 CM (ref 0.6–1.1)
LEFT ATRIUM AREA SYSTOLIC (APICAL 2 CHAMBER): 14.7 CM2
LEFT ATRIUM AREA SYSTOLIC (APICAL 4 CHAMBER): 14.2 CM2
LEFT ATRIUM SIZE: 3.7 CM
LEFT ATRIUM VOLUME MOD: 41.6 CM3
LEFT INTERNAL DIMENSION IN SYSTOLE: 2.7 CM (ref 2.1–4)
LEFT VENTRICLE DIASTOLIC VOLUME: 78.6 ML
LEFT VENTRICLE END DIASTOLIC VOLUME APICAL 2 CHAMBER: 71.2 ML
LEFT VENTRICLE END DIASTOLIC VOLUME APICAL 4 CHAMBER: 92.2 ML
LEFT VENTRICLE END SYSTOLIC VOLUME APICAL 2 CHAMBER: 41.2 ML
LEFT VENTRICLE END SYSTOLIC VOLUME APICAL 4 CHAMBER: 37.8 ML
LEFT VENTRICLE SYSTOLIC VOLUME: 27 ML
LEFT VENTRICULAR INTERNAL DIMENSION IN DIASTOLE: 4.2 CM (ref 3.5–6)
LEFT VENTRICULAR MASS: 147 G
LV LATERAL E/E' RATIO: 7.9 M/S
LV SEPTAL E/E' RATIO: 13.17 M/S
LVED V (TEICH): 78.6 ML
LVES V (TEICH): 27 ML
LVOT MG: 1 MMHG
LVOT MV: 0.54 CM/S
LYMPHOCYTES # BLD AUTO: 2.25 X10(3)/MCL (ref 0.6–4.6)
LYMPHOCYTES NFR BLD AUTO: 26.8 %
MCH RBC QN AUTO: 25.5 PG (ref 27–31)
MCHC RBC AUTO-ENTMCNC: 31.9 G/DL (ref 33–36)
MCV RBC AUTO: 80 FL (ref 80–94)
MONOCYTES # BLD AUTO: 0.8 X10(3)/MCL (ref 0.1–1.3)
MONOCYTES NFR BLD AUTO: 9.5 %
MV MEAN GRADIENT: 2 MMHG
MV PEAK A VEL: 0.82 M/S
MV PEAK E VEL: 0.79 M/S
MV PEAK GRADIENT: 4 MMHG
MV VALVE AREA BY CONTINUITY EQUATION: 3.05 CM2
NEUTROPHILS # BLD AUTO: 5.09 X10(3)/MCL (ref 2.1–9.2)
NEUTROPHILS NFR BLD AUTO: 60.8 %
NRBC BLD AUTO-RTO: 0 %
OHS CV RV/LV RATIO: 0.83 CM
OHS LV EJECTION FRACTION SIMPSONS BIPLANE MOD: 67 %
PISA TR MAX VEL: 4.19 M/S
PLATELET # BLD AUTO: 564 X10(3)/MCL (ref 130–400)
PMV BLD AUTO: 9.4 FL (ref 7.4–10.4)
POTASSIUM SERPL-SCNC: 3.6 MMOL/L (ref 3.5–5.1)
RA PRESSURE ESTIMATED: 8 MMHG
RBC # BLD AUTO: 4.19 X10(6)/MCL (ref 4.7–6.1)
RIGHT VENTRICLE DIASTOLIC MID DIMENSION: 2.8 CM
RIGHT VENTRICULAR END-DIASTOLIC DIMENSION: 3.5 CM
RV MID DIAMA: 2.8 CM
RV TB RVSP: 12 MMHG
SODIUM SERPL-SCNC: 143 MMOL/L (ref 136–145)
TDI LATERAL: 0.1 M/S
TDI SEPTAL: 0.06 M/S
TDI: 0.08 M/S
TOBRAMYCIN TROUGH SERPL-MCNC: 2.4 UG/ML (ref 0.3–2)
TR MAX PG: 70 MMHG
TRICUSPID ANNULAR PLANE SYSTOLIC EXCURSION: 1.96 CM
TV REST PULMONARY ARTERY PRESSURE: 78 MMHG
WBC # BLD AUTO: 8.38 X10(3)/MCL (ref 4.5–11.5)

## 2024-08-25 PROCEDURE — 25000003 PHARM REV CODE 250: Performed by: NURSE PRACTITIONER

## 2024-08-25 PROCEDURE — 36415 COLL VENOUS BLD VENIPUNCTURE: CPT | Performed by: INTERNAL MEDICINE

## 2024-08-25 PROCEDURE — 80200 ASSAY OF TOBRAMYCIN: CPT | Performed by: INTERNAL MEDICINE

## 2024-08-25 PROCEDURE — 63600175 PHARM REV CODE 636 W HCPCS: Performed by: NURSE PRACTITIONER

## 2024-08-25 PROCEDURE — 25000003 PHARM REV CODE 250: Performed by: INTERNAL MEDICINE

## 2024-08-25 PROCEDURE — 63600175 PHARM REV CODE 636 W HCPCS: Performed by: INTERNAL MEDICINE

## 2024-08-25 PROCEDURE — 80048 BASIC METABOLIC PNL TOTAL CA: CPT | Performed by: INTERNAL MEDICINE

## 2024-08-25 PROCEDURE — 21400001 HC TELEMETRY ROOM

## 2024-08-25 PROCEDURE — 85025 COMPLETE CBC W/AUTO DIFF WBC: CPT | Performed by: INTERNAL MEDICINE

## 2024-08-25 PROCEDURE — 11000001 HC ACUTE MED/SURG PRIVATE ROOM

## 2024-08-25 RX ADMIN — MICONAZOLE NITRATE 2 % TOPICAL POWDER: at 09:08

## 2024-08-25 RX ADMIN — OXYBUTYNIN CHLORIDE 5 MG: 5 TABLET ORAL at 10:08

## 2024-08-25 RX ADMIN — MEROPENEM 1 G: 1 INJECTION, POWDER, FOR SOLUTION INTRAVENOUS at 05:08

## 2024-08-25 RX ADMIN — METHOCARBAMOL 750 MG: 750 TABLET ORAL at 08:08

## 2024-08-25 RX ADMIN — AMLODIPINE BESYLATE 10 MG: 5 TABLET ORAL at 10:08

## 2024-08-25 RX ADMIN — MICONAZOLE NITRATE 2 % TOPICAL POWDER: at 03:08

## 2024-08-25 RX ADMIN — CARVEDILOL 25 MG: 12.5 TABLET, FILM COATED ORAL at 10:08

## 2024-08-25 RX ADMIN — CLONIDINE HYDROCHLORIDE 0.1 MG: 0.1 TABLET ORAL at 08:08

## 2024-08-25 RX ADMIN — GABAPENTIN 300 MG: 300 CAPSULE ORAL at 03:08

## 2024-08-25 RX ADMIN — HYDROXYZINE PAMOATE 50 MG: 50 CAPSULE ORAL at 08:08

## 2024-08-25 RX ADMIN — OXYCODONE HYDROCHLORIDE AND ACETAMINOPHEN 1 TABLET: 10; 325 TABLET ORAL at 02:08

## 2024-08-25 RX ADMIN — FUROSEMIDE 20 MG: 10 INJECTION, SOLUTION INTRAMUSCULAR; INTRAVENOUS at 03:08

## 2024-08-25 RX ADMIN — FUROSEMIDE 20 MG: 10 INJECTION, SOLUTION INTRAMUSCULAR; INTRAVENOUS at 08:08

## 2024-08-25 RX ADMIN — FLUCONAZOLE 400 MG: 2 INJECTION, SOLUTION INTRAVENOUS at 06:08

## 2024-08-25 RX ADMIN — TOBRAMYCIN SULFATE 445 MG: 40 INJECTION, SOLUTION INTRAMUSCULAR; INTRAVENOUS at 05:08

## 2024-08-25 RX ADMIN — METHOCARBAMOL 750 MG: 750 TABLET ORAL at 10:08

## 2024-08-25 RX ADMIN — FLUOXETINE HYDROCHLORIDE 20 MG: 20 CAPSULE ORAL at 09:08

## 2024-08-25 RX ADMIN — OXYCODONE HYDROCHLORIDE AND ACETAMINOPHEN 1 TABLET: 10; 325 TABLET ORAL at 09:08

## 2024-08-25 RX ADMIN — DOXYCYCLINE HYCLATE 100 MG: 100 TABLET, COATED ORAL at 09:08

## 2024-08-25 RX ADMIN — OXYBUTYNIN CHLORIDE 5 MG: 5 TABLET ORAL at 08:08

## 2024-08-25 RX ADMIN — OXYCODONE HYDROCHLORIDE AND ACETAMINOPHEN 1 TABLET: 10; 325 TABLET ORAL at 08:08

## 2024-08-25 RX ADMIN — DOXYCYCLINE HYCLATE 100 MG: 100 TABLET, COATED ORAL at 08:08

## 2024-08-25 RX ADMIN — MEROPENEM 1 G: 1 INJECTION, POWDER, FOR SOLUTION INTRAVENOUS at 10:08

## 2024-08-25 RX ADMIN — MEROPENEM 1 G: 1 INJECTION, POWDER, FOR SOLUTION INTRAVENOUS at 03:08

## 2024-08-25 RX ADMIN — GABAPENTIN 300 MG: 300 CAPSULE ORAL at 09:08

## 2024-08-25 RX ADMIN — OXYCODONE HYDROCHLORIDE AND ACETAMINOPHEN 1 TABLET: 10; 325 TABLET ORAL at 03:08

## 2024-08-25 RX ADMIN — CLONIDINE HYDROCHLORIDE 0.1 MG: 0.1 TABLET ORAL at 09:08

## 2024-08-25 RX ADMIN — FAMOTIDINE 20 MG: 20 TABLET, FILM COATED ORAL at 10:08

## 2024-08-25 RX ADMIN — CARVEDILOL 25 MG: 12.5 TABLET, FILM COATED ORAL at 05:08

## 2024-08-25 RX ADMIN — CLONIDINE HYDROCHLORIDE 0.1 MG: 0.1 TABLET ORAL at 03:08

## 2024-08-25 RX ADMIN — ATORVASTATIN CALCIUM 20 MG: 10 TABLET, FILM COATED ORAL at 08:08

## 2024-08-25 RX ADMIN — TRAZODONE HYDROCHLORIDE 200 MG: 100 TABLET ORAL at 08:08

## 2024-08-25 RX ADMIN — GABAPENTIN 300 MG: 300 CAPSULE ORAL at 08:08

## 2024-08-25 RX ADMIN — OXYBUTYNIN CHLORIDE 5 MG: 5 TABLET ORAL at 03:08

## 2024-08-25 NOTE — NURSING
Nurses Note -- 4 Eyes      8/25/2024   10:36 AM      Skin assessed during: Q Shift Change      [] No Altered Skin Integrity Present    []Prevention Measures Documented      [x] Yes- Altered Skin Integrity Present or Discovered   [] LDA Added if Not in Epic (Describe Wound)   [] New Altered Skin Integrity was Present on Admit and Documented in LDA   [] Wound Image Taken    Wound Care Consulted? Yes    Attending Nurse:  Virginia Bernabe RN/Staff Member:   GIUSEPPE SALINAS RN
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
## 2024-08-25 NOTE — PROGRESS NOTES
"Pharmacokinetic Follow Up: Tobramycin    Assessment of levels:   Random concentration of 2.4 mcg/mL (11 hours post-infusion) corresponds to a dosing interval of every 24 hours per the [employer]    Regimen Plan:   Continue current dose: Tobramycin 445 mg IV every 24 hours    Drug levels (last 3 results):  No results for input(s): "AMIKACINPEAK", "AMIKACINTROU", "AMIKACINRAND", "AMIKACIN" in the last 72 hours.    No results for input(s): "GENTAMICIN" in the last 72 hours.    Invalid input(s): "GENTPEAK", "GENTTROUGH", "GENT10", "GENT12", "GENT8", "GENTRANDOM"    Recent Labs   Lab Result Units 08/25/24  0008   Tobramycin Trough ug/ml 2.4*       Pharmacy will continue to monitor.    Please contact pharmacy at extension [phone] with any questions regarding this assessment.    Thank you for the consult,   Robb Templetonlly      Patient brief summary:  Bianca Khan is a 59 y.o. male initiated on aminoglycoside therapy for treatment of skin & soft tissue infection    Drug Allergies:   Review of patient's allergies indicates:   Allergen Reactions    Baclofen Itching and Anxiety       Actual Body Weight:   63.5kg    Adjust Body Weight:   63.5kg    Ideal Body Weight:  73kg    Renal Function:   Estimated Creatinine Clearance: 87.1 mL/min (based on SCr of 0.81 mg/dL).,     Dialysis Method (if applicable):  N/A    CBC (last 72 hours):  Recent Labs   Lab Result Units 08/22/24  0352 08/23/24  0731 08/24/24  0441   WBC x10(3)/mcL 8.83 7.04 9.61   Hgb g/dL 6.4* 9.3* 10.7*   Hct % 20.9* 29.5* 33.6*   Platelet x10(3)/mcL 461* 474* 537*   Mono % % 12.1 16.2 9.7   Eos % % 0.7 1.4 1.0   Basophil % % 0.5 0.6 0.3       Metabolic Panel (last 72 hours):  Recent Labs   Lab Result Units 08/22/24  0352 08/23/24  0731 08/24/24  0441   Sodium mmol/L 137 136 143   Potassium mmol/L 4.2 4.1 3.8   Chloride mmol/L 107 108* 107   CO2 mmol/L 21* 21* 24   Glucose mg/dL 81* 87 75*   Blood Urea Nitrogen mg/dL 12.4 7.7* 5.6*   Creatinine mg/dL 1.04 0.85 " 0.81   Albumin g/dL 2.0*  --   --    Bilirubin Total mg/dL 0.4  --   --    ALP unit/L 65  --   --    AST unit/L 14  --   --    ALT unit/L 9  --   --        Aminoglycoside Administrations:  aminoglycosides given in last 96 hours                     tobramycin (NEBCIN) 445 mg in D5W 100 mL IVPB (mg) 445 mg New Bag 08/24/24 1317                    Microbiologic Results:  Microbiology Results (last 7 days)       Procedure Component Value Units Date/Time    Blood Culture [[id number]]  (Normal) Collected: 08/21/24 1644    Order Status: Completed Specimen: Blood, Venous Updated: 08/24/24 1900     Blood Culture No Growth At 72 Hours    Blood Culture [[id number]]  (Normal) Collected: 08/21/24 1644    Order Status: Completed Specimen: Blood, Venous Updated: 08/24/24 1900     Blood Culture No Growth At 72 Hours    Blood Culture #1 **CANNOT BE ORDERED STAT** [[id number]]  (Normal) Collected: 08/20/24 1232    Order Status: Completed Specimen: Blood Updated: 08/24/24 1300     Blood Culture No Growth At 96 Hours    Wound Culture [[id number]]  (Abnormal)  (Susceptibility) Collected: 08/20/24 1527    Order Status: Completed Specimen: Abscess from Sacral Updated: 08/24/24 1134     Wound Culture Few Escherichia coli ESBL      Few Pseudomonas aeruginosa     Comment: CRPA (Carbapenem-resistant Pseudomonas areuginosa)         Few Trichosporon asahii    Blood Culture #2 **CANNOT BE ORDERED STAT** [[id number]]  (Abnormal)  (Susceptibility) Collected: 08/20/24 1540    Order Status: Completed Specimen: Blood Updated: 08/23/24 1237     Blood Culture Escherichia coli ESBL     GRAM STAIN Gram Negative Rods      Seen in gram stain of broth only      2 of 2 bottles positive    Urine culture [[id number]]  (Abnormal)  (Susceptibility) Collected: 08/20/24 1338    Order Status: Completed Specimen: Urine Updated: 08/23/24 0743     Urine Culture >/= 100,000 colonies/ml Escherichia coli ESBL    Fungal Culture Blood [[id number]] Collected: 08/21/24 1644     Order Status: Sent Specimen: Blood Updated: 08/21/24 1648    BCID2 Panel [[id number]]  (Abnormal) Collected: 08/20/24 1540    Order Status: Completed Specimen: Blood Updated: 08/21/24 0839     CTX-M (ESBL ) Not Detected     IMP (Cabapenemase ) Not Detected     KPC resistance gene (Carbapenemase ) Not Detected     mcr-1 Not Detected     mecA ID N/A     Comment: Note: Antimicrobial resistance can occur via multiple mechanisms. A Not Detected result for antimicrobial resistance gene(s) does not indicate antimicrobial susceptibility. Subculturing is required for species identification and susceptibility testing of   isolates.        mecA/C and MREJ (MRSA) gene N/A     NDM (Carbapenemase ) Not Detected     OXA-48-like (Carbapenemase ) Not Detected     Kenroy/B (VRE gene) N/A     VIM (Carbapenemase ) Not Detected     Enterococcus faecalis Not Detected     Enterococcus faecium Not Detected     Listeria monocytogenes Not Detected     Staphylococcus spp. Not Detected     Staphylococcus aureus Not Detected     Staphylococcus epidermidis Not Detected     Staphylococcus lugdunensis Not Detected     Streptococcus spp. Not Detected     Streptococcus agalactiae (Group B) Not Detected     Streptococcus pneumoniae Not Detected     Streptococcus pyogenes (Group A) Not Detected     Acinetobacter calcoaceticus/baumannii complex Not Detected     Bacteroides fragilis Not Detected     Enterobacterales Detected     Enterobacter cloacae complex Not Detected     Escherichia coli Detected     Klebsiella aerogenes Not Detected     Klebsiella oxytoca Not Detected     Klebsiella pneumoniae group Not Detected     Proteus spp. Not Detected     Salmonella spp. Not Detected     Serratia marcescens Not Detected     Haemophilus influenzae Not Detected     Neisseria meningitidis Not Detected     Pseudomonas aeruginosa Not Detected     Stenotrophomonas maltophilia Not Detected     Candida albicans  Detected     Candida auris Not Detected     Candida glabrata Not Detected     Candida krusei Not Detected     Candida parapsilosis Not Detected     Candida tropicalis Not Detected     Cryptococcus neoformans/gattii Not Detected    Narrative:      The [employer] BCID2 Panel is a multiplexed nucleic acid test intended for the use with [employer]® 2.0 or [employer]® [employer] for the simultaneous qualitative detection and identification of multiple bacterial and yeast nucleic acids and select genetic determinants associated with antimicrobial resistance.  The [employer] BCID2 Panel test is performed directly on blood culture samples identified as positive by a continuous monitoring blood culture system.  Results are intended to be interpreted in conjunction with Gram stain results.

## 2024-08-25 NOTE — NURSING
Nurses Note -- 4 Eyes      8/24/2024   7:31 PM      Skin assessed during: Q Shift Change      [] No Altered Skin Integrity Present    [x]Prevention Measures Documented      [x] Yes- Altered Skin Integrity Present or Discovered   [x] LDA Added if Not in Epic (Describe Wound)   [] New Altered Skin Integrity was Present on Admit and Documented in LDA   [x] Wound Image Taken    Wound Care Consulted? Yes    Attending Nurse:  Carlita Arellano    Second RN/Staff Member:   Remi Connolly

## 2024-08-25 NOTE — NURSING
"DR. Kylah Angeles  (Urology) came and assess the pt   DR. Montero empty out 40cc from the ballon cath  and the pt "stated that he put extra fluid in his balloon port. Dr explain it doesn't need that much fluid in it  added 10 cc no complaint since   "

## 2024-08-26 LAB
ANION GAP SERPL CALC-SCNC: 11 MEQ/L
BACTERIA BLD CULT: NORMAL
BACTERIA BLD CULT: NORMAL
BASOPHILS # BLD AUTO: 0.03 X10(3)/MCL
BASOPHILS NFR BLD AUTO: 0.4 %
BUN SERPL-MCNC: 7.4 MG/DL (ref 8.4–25.7)
CALCIUM SERPL-MCNC: 8.9 MG/DL (ref 8.8–10)
CHLORIDE SERPL-SCNC: 102 MMOL/L (ref 98–107)
CO2 SERPL-SCNC: 28 MMOL/L (ref 23–31)
CREAT SERPL-MCNC: 0.86 MG/DL (ref 0.73–1.18)
CREAT/UREA NIT SERPL: 9
EOSINOPHIL # BLD AUTO: 0.24 X10(3)/MCL (ref 0–0.9)
EOSINOPHIL NFR BLD AUTO: 3.1 %
ERYTHROCYTE [DISTWIDTH] IN BLOOD BY AUTOMATED COUNT: 17.2 % (ref 11.5–17)
GFR SERPLBLD CREATININE-BSD FMLA CKD-EPI: >60 ML/MIN/1.73/M2
GLUCOSE SERPL-MCNC: 105 MG/DL (ref 82–115)
HCT VFR BLD AUTO: 34.4 % (ref 42–52)
HGB BLD-MCNC: 11 G/DL (ref 14–18)
IMM GRANULOCYTES # BLD AUTO: 0.03 X10(3)/MCL (ref 0–0.04)
IMM GRANULOCYTES NFR BLD AUTO: 0.4 %
LYMPHOCYTES # BLD AUTO: 2.47 X10(3)/MCL (ref 0.6–4.6)
LYMPHOCYTES NFR BLD AUTO: 32 %
MCH RBC QN AUTO: 25.3 PG (ref 27–31)
MCHC RBC AUTO-ENTMCNC: 32 G/DL (ref 33–36)
MCV RBC AUTO: 79.1 FL (ref 80–94)
MONOCYTES # BLD AUTO: 0.77 X10(3)/MCL (ref 0.1–1.3)
MONOCYTES NFR BLD AUTO: 10 %
NEUTROPHILS # BLD AUTO: 4.17 X10(3)/MCL (ref 2.1–9.2)
NEUTROPHILS NFR BLD AUTO: 54.1 %
NRBC BLD AUTO-RTO: 0 %
PATH REV: NORMAL
PLATELET # BLD AUTO: 638 X10(3)/MCL (ref 130–400)
PMV BLD AUTO: 9.3 FL (ref 7.4–10.4)
POTASSIUM SERPL-SCNC: 3.6 MMOL/L (ref 3.5–5.1)
RBC # BLD AUTO: 4.35 X10(6)/MCL (ref 4.7–6.1)
SODIUM SERPL-SCNC: 141 MMOL/L (ref 136–145)
TOBRAMYCIN TROUGH SERPL-MCNC: 0.5 UG/ML (ref 0.3–2)
WBC # BLD AUTO: 7.71 X10(3)/MCL (ref 4.5–11.5)

## 2024-08-26 PROCEDURE — 25000003 PHARM REV CODE 250: Performed by: INTERNAL MEDICINE

## 2024-08-26 PROCEDURE — 63600175 PHARM REV CODE 636 W HCPCS: Performed by: INTERNAL MEDICINE

## 2024-08-26 PROCEDURE — 36415 COLL VENOUS BLD VENIPUNCTURE: CPT | Performed by: INTERNAL MEDICINE

## 2024-08-26 PROCEDURE — 97606 NEG PRS WND THER DME>50 SQCM: CPT

## 2024-08-26 PROCEDURE — 63600175 PHARM REV CODE 636 W HCPCS: Performed by: NURSE PRACTITIONER

## 2024-08-26 PROCEDURE — 25000003 PHARM REV CODE 250: Performed by: NURSE PRACTITIONER

## 2024-08-26 PROCEDURE — 11000001 HC ACUTE MED/SURG PRIVATE ROOM

## 2024-08-26 PROCEDURE — 80048 BASIC METABOLIC PNL TOTAL CA: CPT | Performed by: INTERNAL MEDICINE

## 2024-08-26 PROCEDURE — 21400001 HC TELEMETRY ROOM

## 2024-08-26 PROCEDURE — 85025 COMPLETE CBC W/AUTO DIFF WBC: CPT | Performed by: INTERNAL MEDICINE

## 2024-08-26 PROCEDURE — 27000207 HC ISOLATION

## 2024-08-26 PROCEDURE — 80200 ASSAY OF TOBRAMYCIN: CPT | Performed by: INTERNAL MEDICINE

## 2024-08-26 RX ADMIN — TOBRAMYCIN SULFATE 445 MG: 40 INJECTION, SOLUTION INTRAMUSCULAR; INTRAVENOUS at 06:08

## 2024-08-26 RX ADMIN — AMLODIPINE BESYLATE 10 MG: 5 TABLET ORAL at 08:08

## 2024-08-26 RX ADMIN — GABAPENTIN 300 MG: 300 CAPSULE ORAL at 08:08

## 2024-08-26 RX ADMIN — RIVAROXABAN 20 MG: 10 TABLET, FILM COATED ORAL at 05:08

## 2024-08-26 RX ADMIN — METHOCARBAMOL 750 MG: 750 TABLET ORAL at 09:08

## 2024-08-26 RX ADMIN — ATORVASTATIN CALCIUM 20 MG: 10 TABLET, FILM COATED ORAL at 09:08

## 2024-08-26 RX ADMIN — CARVEDILOL 25 MG: 12.5 TABLET, FILM COATED ORAL at 08:08

## 2024-08-26 RX ADMIN — FLUOXETINE HYDROCHLORIDE 20 MG: 20 CAPSULE ORAL at 08:08

## 2024-08-26 RX ADMIN — OXYCODONE HYDROCHLORIDE AND ACETAMINOPHEN 1 TABLET: 10; 325 TABLET ORAL at 05:08

## 2024-08-26 RX ADMIN — OXYCODONE HYDROCHLORIDE AND ACETAMINOPHEN 1 TABLET: 10; 325 TABLET ORAL at 12:08

## 2024-08-26 RX ADMIN — MEROPENEM 1 G: 1 INJECTION, POWDER, FOR SOLUTION INTRAVENOUS at 02:08

## 2024-08-26 RX ADMIN — FUROSEMIDE 20 MG: 10 INJECTION, SOLUTION INTRAMUSCULAR; INTRAVENOUS at 09:08

## 2024-08-26 RX ADMIN — CLONIDINE HYDROCHLORIDE 0.1 MG: 0.1 TABLET ORAL at 02:08

## 2024-08-26 RX ADMIN — FUROSEMIDE 20 MG: 10 INJECTION, SOLUTION INTRAMUSCULAR; INTRAVENOUS at 08:08

## 2024-08-26 RX ADMIN — FLUCONAZOLE 400 MG: 2 INJECTION, SOLUTION INTRAVENOUS at 08:08

## 2024-08-26 RX ADMIN — MEROPENEM 1 G: 1 INJECTION, POWDER, FOR SOLUTION INTRAVENOUS at 05:08

## 2024-08-26 RX ADMIN — GABAPENTIN 300 MG: 300 CAPSULE ORAL at 09:08

## 2024-08-26 RX ADMIN — DOXYCYCLINE HYCLATE 100 MG: 100 TABLET, COATED ORAL at 09:08

## 2024-08-26 RX ADMIN — CARVEDILOL 25 MG: 12.5 TABLET, FILM COATED ORAL at 05:08

## 2024-08-26 RX ADMIN — OXYBUTYNIN CHLORIDE 5 MG: 5 TABLET ORAL at 09:08

## 2024-08-26 RX ADMIN — MEROPENEM 1 G: 1 INJECTION, POWDER, FOR SOLUTION INTRAVENOUS at 09:08

## 2024-08-26 RX ADMIN — CLONIDINE HYDROCHLORIDE 0.1 MG: 0.1 TABLET ORAL at 09:08

## 2024-08-26 RX ADMIN — TRAZODONE HYDROCHLORIDE 200 MG: 100 TABLET ORAL at 09:08

## 2024-08-26 RX ADMIN — OXYCODONE HYDROCHLORIDE AND ACETAMINOPHEN 1 TABLET: 10; 325 TABLET ORAL at 10:08

## 2024-08-26 RX ADMIN — MICONAZOLE NITRATE 2 % TOPICAL POWDER: at 10:08

## 2024-08-26 RX ADMIN — CLONIDINE HYDROCHLORIDE 0.1 MG: 0.1 TABLET ORAL at 08:08

## 2024-08-26 RX ADMIN — DOXYCYCLINE HYCLATE 100 MG: 100 TABLET, COATED ORAL at 08:08

## 2024-08-26 RX ADMIN — OXYBUTYNIN CHLORIDE 5 MG: 5 TABLET ORAL at 02:08

## 2024-08-26 RX ADMIN — GABAPENTIN 300 MG: 300 CAPSULE ORAL at 02:08

## 2024-08-26 RX ADMIN — MICONAZOLE NITRATE 2 % TOPICAL POWDER: at 08:08

## 2024-08-26 RX ADMIN — METHOCARBAMOL 750 MG: 750 TABLET ORAL at 08:08

## 2024-08-26 RX ADMIN — HYDROXYZINE PAMOATE 50 MG: 50 CAPSULE ORAL at 09:08

## 2024-08-26 RX ADMIN — OXYBUTYNIN CHLORIDE 5 MG: 5 TABLET ORAL at 08:08

## 2024-08-26 RX ADMIN — FAMOTIDINE 20 MG: 20 TABLET, FILM COATED ORAL at 08:08

## 2024-08-26 NOTE — NURSING
Nurses Note -- 4 Eyes      8/26/2024   11:51 AM      Skin assessed during: Q Shift Change      [] No Altered Skin Integrity Present    []Prevention Measures Documented      [x] Yes- Altered Skin Integrity Present or Discovered   [x] LDA Added if Not in Epic (Describe Wound)   [] New Altered Skin Integrity was Present on Admit and Documented in LDA   [x] Wound Image Taken    Wound Care Consulted? Yes    Attending Nurse:  Remi Bernabe RN/Staff Member:   Carlita Arellano

## 2024-08-26 NOTE — PROGRESS NOTES
Pharmacokinetic Follow Up: Tobramycin    Assessment of levels:     Trough of 0.5 mcg/ml is within goal range.    Regimen Plan:     Continue 445 mg q24h.      Recent Labs   Lab Result Units 08/25/24  0008 08/26/24  1658   Tobramycin Trough ug/ml 2.4* 0.5       Patient brief summary:  Bianca Khan is a 59 y.o. male initiated on aminoglycoside therapy for treatment of skin & soft tissue infection    Drug Allergies:   Review of patient's allergies indicates:   Allergen Reactions    Baclofen Itching and Anxiety         Renal Function:   Estimated Creatinine Clearance: 82 mL/min (based on SCr of 0.86 mg/dL).,     Dialysis Method (if applicable):  N/A    CBC (last 72 hours):  Recent Labs   Lab Result Units 08/24/24  0441 08/25/24  0619 08/26/24  0421   WBC x10(3)/mcL 9.61 8.38 7.71   Hgb g/dL 10.7* 10.7* 11.0*   Hct % 33.6* 33.5* 34.4*   Platelet x10(3)/mcL 537* 564* 638*   Mono % % 9.7 9.5 10.0   Eos % % 1.0 2.0 3.1   Basophil % % 0.3 0.4 0.4       Metabolic Panel (last 72 hours):  Recent Labs   Lab Result Units 08/24/24  0441 08/25/24  0619 08/26/24  0421   Sodium mmol/L 143 143 141   Potassium mmol/L 3.8 3.6 3.6   Chloride mmol/L 107 105 102   CO2 mmol/L 24 26 28   Glucose mg/dL 75* 114 105   Blood Urea Nitrogen mg/dL 5.6* 7.0* 7.4*   Creatinine mg/dL 0.81 0.79 0.86       Aminoglycoside Administrations:  aminoglycosides given in last 96 hours                     tobramycin (NEBCIN) 445 mg in D5W 100 mL IVPB (mg) 445 mg New Bag 08/25/24 1759    tobramycin (NEBCIN) 445 mg in D5W 100 mL IVPB (mg) 445 mg New Bag 08/24/24 1317                    Microbiologic Results:  Microbiology Results (last 7 days)       Procedure Component Value Units Date/Time    Blood Culture [[id number]]  (Normal) Collected: 08/21/24 1644    Order Status: Completed Specimen: Blood, Venous Updated: 08/25/24 1902     Blood Culture No Growth At 96 Hours    Blood Culture [[id number]]  (Normal) Collected: 08/21/24 1644    Order Status: Completed  Specimen: Blood, Venous Updated: 08/25/24 1902     Blood Culture No Growth At 96 Hours    Blood Culture #1 **CANNOT BE ORDERED STAT** [[id number]]  (Normal) Collected: 08/20/24 1232    Order Status: Completed Specimen: Blood Updated: 08/25/24 1300     Blood Culture No Growth at 5 days    Wound Culture [[id number]]  (Abnormal)  (Susceptibility) Collected: 08/20/24 1527    Order Status: Completed Specimen: Abscess from Sacral Updated: 08/24/24 1134     Wound Culture Few Escherichia coli ESBL      Few Pseudomonas aeruginosa     Comment: CRPA (Carbapenem-resistant Pseudomonas areuginosa)         Few Trichosporon asahii    Blood Culture #2 **CANNOT BE ORDERED STAT** [[id number]]  (Abnormal)  (Susceptibility) Collected: 08/20/24 1540    Order Status: Completed Specimen: Blood Updated: 08/23/24 1237     Blood Culture Escherichia coli ESBL     GRAM STAIN Gram Negative Rods      Seen in gram stain of broth only      2 of 2 bottles positive    Urine culture [[id number]]  (Abnormal)  (Susceptibility) Collected: 08/20/24 1338    Order Status: Completed Specimen: Urine Updated: 08/23/24 0743     Urine Culture >/= 100,000 colonies/ml Escherichia coli ESBL    Fungal Culture Blood [[id number]] Collected: 08/21/24 1644    Order Status: Sent Specimen: Blood Updated: 08/21/24 1648    BCID2 Panel [[id number]]  (Abnormal) Collected: 08/20/24 1540    Order Status: Completed Specimen: Blood Updated: 08/21/24 0839     CTX-M (ESBL ) Not Detected     IMP (Cabapenemase ) Not Detected     KPC resistance gene (Carbapenemase ) Not Detected     mcr-1 Not Detected     mecA ID N/A     Comment: Note: Antimicrobial resistance can occur via multiple mechanisms. A Not Detected result for antimicrobial resistance gene(s) does not indicate antimicrobial susceptibility. Subculturing is required for species identification and susceptibility testing of   isolates.        mecA/C and MREJ (MRSA) gene N/A     NDM (Carbapenemase  ) Not Detected     OXA-48-like (Carbapenemase ) Not Detected     Kenroy/B (VRE gene) N/A     VIM (Carbapenemase ) Not Detected     Enterococcus faecalis Not Detected     Enterococcus faecium Not Detected     Listeria monocytogenes Not Detected     Staphylococcus spp. Not Detected     Staphylococcus aureus Not Detected     Staphylococcus epidermidis Not Detected     Staphylococcus lugdunensis Not Detected     Streptococcus spp. Not Detected     Streptococcus agalactiae (Group B) Not Detected     Streptococcus pneumoniae Not Detected     Streptococcus pyogenes (Group A) Not Detected     Acinetobacter calcoaceticus/baumannii complex Not Detected     Bacteroides fragilis Not Detected     Enterobacterales Detected     Enterobacter cloacae complex Not Detected     Escherichia coli Detected     Klebsiella aerogenes Not Detected     Klebsiella oxytoca Not Detected     Klebsiella pneumoniae group Not Detected     Proteus spp. Not Detected     Salmonella spp. Not Detected     Serratia marcescens Not Detected     Haemophilus influenzae Not Detected     Neisseria meningitidis Not Detected     Pseudomonas aeruginosa Not Detected     Stenotrophomonas maltophilia Not Detected     Candida albicans Detected     Candida auris Not Detected     Candida glabrata Not Detected     Candida krusei Not Detected     Candida parapsilosis Not Detected     Candida tropicalis Not Detected     Cryptococcus neoformans/gattii Not Detected    Narrative:      The [employer] BCID2 Panel is a multiplexed nucleic acid test intended for the use with CorCardia® 2.0 or CorCardia® Instablogs Systems for the simultaneous qualitative detection and identification of multiple bacterial and yeast nucleic acids and select genetic determinants associated with antimicrobial resistance.  The [employer] BCID2 Panel test is performed directly on blood culture samples identified as positive by a continuous monitoring blood culture system.   Results are intended to be interpreted in conjunction with Gram stain results.
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
## 2024-08-26 NOTE — PROGRESS NOTES
UROLOGY  PROGRESS  NOTE    Bianca Khan 1965  [id number]  8/26/2024    Patient reports some improvement in leakage from SP tube since balloon deflated to 10 mL   Ditropan increased Friday     VSS, afebrile   3150 mL from SP tube overnight   WBC 7.71   H&H 11.0/34.4   BUN and creatinine 7.4/0.86    Intake/Output:  No intake/output data recorded.  I/O last 3 completed shifts:  In: 1510 [P.O.:1510]  Out: 4760 [Urine:4760]     Exam:    NAD  Card: RRR  Resp: unlabored  Abd:  Soft, NT ND; SP tube site without swelling or erythema, minimal urine on towel  :  Clear yellow urine draining to  bag from SP tube  Extremity:  Paraplegia, bilateral lower extremity contractures    Recent Results (from the past 24 hour(s))   Basic Metabolic Panel    Collection Time: 08/26/24  4:21 AM   Result Value Ref Range    Sodium 141 136 - 145 mmol/L    Potassium 3.6 3.5 - 5.1 mmol/L    Chloride 102 98 - 107 mmol/L    CO2 28 23 - 31 mmol/L    Glucose 105 82 - 115 mg/dL    Blood Urea Nitrogen 7.4 (L) 8.4 - 25.7 mg/dL    Creatinine 0.86 0.73 - 1.18 mg/dL    BUN/Creatinine Ratio 9     Calcium 8.9 8.8 - 10.0 mg/dL    Anion Gap 11.0 mEq/L    eGFR >60 mL/min/1.73/m2   CBC with Differential    Collection Time: 08/26/24  4:21 AM   Result Value Ref Range    WBC 7.71 4.50 - 11.50 x10(3)/mcL    RBC 4.35 (L) 4.70 - 6.10 x10(6)/mcL    Hgb 11.0 (L) 14.0 - 18.0 g/dL    Hct 34.4 (L) 42.0 - 52.0 %    MCV 79.1 (L) 80.0 - 94.0 fL    MCH 25.3 (L) 27.0 - 31.0 pg    MCHC 32.0 (L) 33.0 - 36.0 g/dL    RDW 17.2 (H) 11.5 - 17.0 %    Platelet 638 (H) 130 - 400 x10(3)/mcL    MPV 9.3 7.4 - 10.4 fL    Neut % 54.1 %    Lymph % 32.0 %    Mono % 10.0 %    Eos % 3.1 %    Basophil % 0.4 %    Lymph # 2.47 0.6 - 4.6 x10(3)/mcL    Neut # 4.17 2.1 - 9.2 x10(3)/mcL    Mono # 0.77 0.1 - 1.3 x10(3)/mcL    Eos # 0.24 0 - 0.9 x10(3)/mcL    Baso # 0.03 <=0.2 x10(3)/mcL    IG# 0.03 0 - 0.04 x10(3)/mcL    IG% 0.4 %    NRBC% 0.0 %       Assessment:  -paraplegia with neurogenic  bladder and suprapubic tube admitted for urosepsis; SPT changed 8/22, having some leaking around lentz which has improved with decreasing balloon to 10ml  -ANTON - resolved    Plan:  -continue Ditropan   -continue SP tube change every 2 weeks   -if he continues with leakage, may need to place larger size catheter. Will check in on pt tomorrow.       Kristin Quinn, NP

## 2024-08-26 NOTE — NURSING
Patient suprapubic catheter site checked multiple times, still leaking but very minimal, spoke to Urologist, will come and change suprapubic tomorrow, patient made aware.

## 2024-08-26 NOTE — PLAN OF CARE
Problem: Adult Inpatient Plan of Care  Goal: Plan of Care Review  Outcome: Progressing  Goal: Patient-Specific Goal (Individualized)  Outcome: Progressing  Goal: Absence of Hospital-Acquired Illness or Injury  Outcome: Progressing  Goal: Optimal Comfort and Wellbeing  Outcome: Progressing  Goal: Readiness for Transition of Care  Outcome: Progressing     Problem: Wound  Goal: Optimal Coping  Outcome: Progressing  Goal: Optimal Functional Ability  Outcome: Progressing  Goal: Absence of Infection Signs and Symptoms  Outcome: Progressing  Goal: Improved Oral Intake  Outcome: Progressing  Goal: Optimal Pain Control and Function  Outcome: Progressing  Goal: Skin Health and Integrity  Outcome: Progressing  Goal: Optimal Wound Healing  Outcome: Progressing     Problem: Sepsis/Septic Shock  Goal: Optimal Coping  Outcome: Progressing  Goal: Absence of Bleeding  Outcome: Progressing  Goal: Blood Glucose Level Within Targeted Range  Outcome: Progressing  Goal: Absence of Infection Signs and Symptoms  Outcome: Progressing  Goal: Optimal Nutrition Intake  Outcome: Progressing     Problem: Diabetes Comorbidity  Goal: Blood Glucose Level Within Targeted Range  Outcome: Progressing     Problem: Skin Injury Risk Increased  Goal: Skin Health and Integrity  Outcome: Progressing     Problem: Infection  Goal: Absence of Infection Signs and Symptoms  Outcome: Progressing

## 2024-08-26 NOTE — NURSING
Nurses Note -- 4 Eyes      8/25/2024   11:46 PM      Skin assessed during: Q Shift Change      [] No Altered Skin Integrity Present    []Prevention Measures Documented      [x] Yes- Altered Skin Integrity Present or Discovered   [x] LDA Added if Not in Epic (Describe Wound)   [] New Altered Skin Integrity was Present on Admit and Documented in LDA   [x] Wound Image Taken    Wound Care Consulted? Yes    Attending Nurse:  Carlita Bernabe RN/Staff Member:  Virginia Witt

## 2024-08-27 LAB
ANION GAP SERPL CALC-SCNC: 10 MEQ/L
BASOPHILS # BLD AUTO: 0.03 X10(3)/MCL
BASOPHILS NFR BLD AUTO: 0.4 %
BUN SERPL-MCNC: 10.8 MG/DL (ref 8.4–25.7)
CALCIUM SERPL-MCNC: 8.6 MG/DL (ref 8.8–10)
CHLORIDE SERPL-SCNC: 102 MMOL/L (ref 98–107)
CO2 SERPL-SCNC: 28 MMOL/L (ref 23–31)
CREAT SERPL-MCNC: 0.82 MG/DL (ref 0.73–1.18)
CREAT/UREA NIT SERPL: 13
EOSINOPHIL # BLD AUTO: 0.24 X10(3)/MCL (ref 0–0.9)
EOSINOPHIL NFR BLD AUTO: 3.2 %
ERYTHROCYTE [DISTWIDTH] IN BLOOD BY AUTOMATED COUNT: 17.2 % (ref 11.5–17)
GFR SERPLBLD CREATININE-BSD FMLA CKD-EPI: >60 ML/MIN/1.73/M2
GLUCOSE SERPL-MCNC: 122 MG/DL (ref 82–115)
HCT VFR BLD AUTO: 33.9 % (ref 42–52)
HGB BLD-MCNC: 10.8 G/DL (ref 14–18)
IMM GRANULOCYTES # BLD AUTO: 0.05 X10(3)/MCL (ref 0–0.04)
IMM GRANULOCYTES NFR BLD AUTO: 0.7 %
LYMPHOCYTES # BLD AUTO: 2.11 X10(3)/MCL (ref 0.6–4.6)
LYMPHOCYTES NFR BLD AUTO: 28.3 %
MCH RBC QN AUTO: 25.5 PG (ref 27–31)
MCHC RBC AUTO-ENTMCNC: 31.9 G/DL (ref 33–36)
MCV RBC AUTO: 80 FL (ref 80–94)
MONOCYTES # BLD AUTO: 0.66 X10(3)/MCL (ref 0.1–1.3)
MONOCYTES NFR BLD AUTO: 8.8 %
NEUTROPHILS # BLD AUTO: 4.37 X10(3)/MCL (ref 2.1–9.2)
NEUTROPHILS NFR BLD AUTO: 58.6 %
NRBC BLD AUTO-RTO: 0 %
PLATELET # BLD AUTO: 609 X10(3)/MCL (ref 130–400)
PMV BLD AUTO: 9.3 FL (ref 7.4–10.4)
POTASSIUM SERPL-SCNC: 3.6 MMOL/L (ref 3.5–5.1)
RBC # BLD AUTO: 4.24 X10(6)/MCL (ref 4.7–6.1)
SODIUM SERPL-SCNC: 140 MMOL/L (ref 136–145)
WBC # BLD AUTO: 7.46 X10(3)/MCL (ref 4.5–11.5)

## 2024-08-27 PROCEDURE — 25000003 PHARM REV CODE 250: Performed by: NURSE PRACTITIONER

## 2024-08-27 PROCEDURE — 36415 COLL VENOUS BLD VENIPUNCTURE: CPT | Performed by: INTERNAL MEDICINE

## 2024-08-27 PROCEDURE — 21400001 HC TELEMETRY ROOM

## 2024-08-27 PROCEDURE — 27000207 HC ISOLATION

## 2024-08-27 PROCEDURE — 25000003 PHARM REV CODE 250: Performed by: INTERNAL MEDICINE

## 2024-08-27 PROCEDURE — 11000001 HC ACUTE MED/SURG PRIVATE ROOM

## 2024-08-27 PROCEDURE — 85025 COMPLETE CBC W/AUTO DIFF WBC: CPT | Performed by: INTERNAL MEDICINE

## 2024-08-27 PROCEDURE — 80048 BASIC METABOLIC PNL TOTAL CA: CPT | Performed by: INTERNAL MEDICINE

## 2024-08-27 PROCEDURE — 63600175 PHARM REV CODE 636 W HCPCS: Performed by: NURSE PRACTITIONER

## 2024-08-27 PROCEDURE — 94760 N-INVAS EAR/PLS OXIMETRY 1: CPT

## 2024-08-27 PROCEDURE — 63600175 PHARM REV CODE 636 W HCPCS: Performed by: INTERNAL MEDICINE

## 2024-08-27 RX ADMIN — TOBRAMYCIN SULFATE 445 MG: 40 INJECTION, SOLUTION INTRAMUSCULAR; INTRAVENOUS at 05:08

## 2024-08-27 RX ADMIN — CARVEDILOL 25 MG: 12.5 TABLET, FILM COATED ORAL at 09:08

## 2024-08-27 RX ADMIN — CLONIDINE HYDROCHLORIDE 0.1 MG: 0.1 TABLET ORAL at 08:08

## 2024-08-27 RX ADMIN — AMLODIPINE BESYLATE 10 MG: 5 TABLET ORAL at 08:08

## 2024-08-27 RX ADMIN — MEROPENEM 1 G: 1 INJECTION, POWDER, FOR SOLUTION INTRAVENOUS at 05:08

## 2024-08-27 RX ADMIN — TRAZODONE HYDROCHLORIDE 200 MG: 100 TABLET ORAL at 08:08

## 2024-08-27 RX ADMIN — OXYBUTYNIN CHLORIDE 5 MG: 5 TABLET ORAL at 02:08

## 2024-08-27 RX ADMIN — OXYCODONE HYDROCHLORIDE AND ACETAMINOPHEN 1 TABLET: 10; 325 TABLET ORAL at 07:08

## 2024-08-27 RX ADMIN — CLONIDINE HYDROCHLORIDE 0.1 MG: 0.1 TABLET ORAL at 02:08

## 2024-08-27 RX ADMIN — OXYBUTYNIN CHLORIDE 5 MG: 5 TABLET ORAL at 08:08

## 2024-08-27 RX ADMIN — OXYCODONE HYDROCHLORIDE AND ACETAMINOPHEN 1 TABLET: 10; 325 TABLET ORAL at 01:08

## 2024-08-27 RX ADMIN — FUROSEMIDE 20 MG: 10 INJECTION, SOLUTION INTRAMUSCULAR; INTRAVENOUS at 08:08

## 2024-08-27 RX ADMIN — MEROPENEM 1 G: 1 INJECTION, POWDER, FOR SOLUTION INTRAVENOUS at 02:08

## 2024-08-27 RX ADMIN — DOXYCYCLINE HYCLATE 100 MG: 100 TABLET, COATED ORAL at 08:08

## 2024-08-27 RX ADMIN — FLUCONAZOLE 400 MG: 2 INJECTION, SOLUTION INTRAVENOUS at 08:08

## 2024-08-27 RX ADMIN — MEROPENEM 1 G: 1 INJECTION, POWDER, FOR SOLUTION INTRAVENOUS at 10:08

## 2024-08-27 RX ADMIN — ATORVASTATIN CALCIUM 20 MG: 10 TABLET, FILM COATED ORAL at 08:08

## 2024-08-27 RX ADMIN — GABAPENTIN 300 MG: 300 CAPSULE ORAL at 02:08

## 2024-08-27 RX ADMIN — OXYCODONE HYDROCHLORIDE AND ACETAMINOPHEN 1 TABLET: 10; 325 TABLET ORAL at 06:08

## 2024-08-27 RX ADMIN — METHOCARBAMOL 750 MG: 750 TABLET ORAL at 08:08

## 2024-08-27 RX ADMIN — OXYCODONE HYDROCHLORIDE AND ACETAMINOPHEN 1 TABLET: 10; 325 TABLET ORAL at 10:08

## 2024-08-27 RX ADMIN — HYDROXYZINE PAMOATE 50 MG: 50 CAPSULE ORAL at 08:08

## 2024-08-27 RX ADMIN — GABAPENTIN 300 MG: 300 CAPSULE ORAL at 08:08

## 2024-08-27 RX ADMIN — FLUOXETINE HYDROCHLORIDE 20 MG: 20 CAPSULE ORAL at 08:08

## 2024-08-27 RX ADMIN — RIVAROXABAN 20 MG: 10 TABLET, FILM COATED ORAL at 04:08

## 2024-08-27 RX ADMIN — MICONAZOLE NITRATE 2 % TOPICAL POWDER: at 08:08

## 2024-08-27 RX ADMIN — FAMOTIDINE 20 MG: 20 TABLET, FILM COATED ORAL at 08:08

## 2024-08-27 NOTE — PROGRESS NOTES
Infectious Diseases Progress Note  58 y/o male with past medical history of T-spine cord injury with paraplegia, diabetes, HTN, hepatitis-C, chronic MRSA L ankle wound/osteomyelitis, was on suppressive doxycycline and R knee infection/septic arthritis and osteomyelitis with GBS/Enterococcus and Ecoli isolates who presented to ER on 8/20 with fevers, cough and body aches. On admit he was noted to be febrile with leukocytosis, WBC 17.25. Blood cultures revealed ESLB Ecoli with Ecoli and Candida Albicans detected on BCID. U/A with 1+ nitrites, 500 LE, 50-99 RBC, >100 WBC, occasional bacteria, budding yeast. Urine culture >100k ESBL Ecoli. Wound culture revealing ESBL Ecoli, CR Pseudomonas and Trichosporon isolates.  and .6. MRSA/MSSA PCR (-). CXR unremarkable.   He is currently on Merrem and Diflucan    Subjective:  Lying in bed, no acute distress. No new complaints voiced. Afebrile. Wife present     ROS  Constitutional:  Positive for malaise/fatigue.   HENT: Negative.     Eyes: Negative.    Respiratory: Negative.     Cardiovascular: Negative.    Gastrointestinal: Negative.    Musculoskeletal: Negative.    Skin:         Multiple wounds   Neurological:  Positive for focal weakness and weakness.   All other systems reviewed and are negative.    Review of patient's allergies indicates:   Allergen Reactions    Baclofen Itching and Anxiety       Past Medical History:   Diagnosis Date    Arthritis     Chronic ulcer of ankle 05/26/2022    Frequent UTI 07/02/2019    Generalized anxiety disorder 05/26/2022    Neurogenic bladder 05/26/2022    Osteomyelitis 05/26/2022    Presence of suprapubic catheter 05/26/2022    Pure hypercholesterolemia 05/26/2022    Retention of urine, unspecified 08/09/2019    Spinal cord injury at T1-T6 level 04/20/2018       Past Surgical History:   Procedure Laterality Date    FRACTURE SURGERY  2021    INCISION AND DRAINAGE, LOWER EXTREMITY Right 06/29/2023    Procedure: INCISION AND  DRAINAGE, LOWER EXTREMITY;  Surgeon: Prabhu Shen DO;  Location: [facility] OR;  Service: Orthopedics;  Laterality: Right;  supine bone foam wash stuff cultures    INCISION AND DRAINAGE, LOWER EXTREMITY Right 2023    Procedure: INCISION AND DRAINAGE, LOWER EXTREMITY;  Surgeon: Prabhu Shen DO;  Location: [facility] OR;  Service: Orthopedics;  Laterality: Right;  supine any table bone foam wash stuff possible wound vac    INCISION AND DRAINAGE, LOWER EXTREMITY Right 2023    Procedure: INCISION AND DRAINAGE, LOWER EXTREMITY;  Surgeon: Prabhu Shen DO;  Location: [facility] OR;  Service: Orthopedics;  Laterality: Right;  supine bone foam vascular bed removal of distal femoral plate, wash stuff, possible AKA    INSERTION OF INTRAMEDULLARY MARISA Right 08/10/2022    Procedure: INSERTION, INTRAMEDULLARY MARISA RIGHT TIBIA;  Surgeon: Jorge Orellana MD;  Location: [facility] OR;  Service: Orthopedics;  Laterality: Right;    JOINT REPLACEMENT      Ankel    REMOVAL OF HARDWARE FROM LOWER EXTREMITY Right 2023    Procedure: REMOVAL, HARDWARE, LOWER EXTREMITY;  Surgeon: Prabhu Shen DO;  Location: [facility] OR;  Service: Orthopedics;  Laterality: Right;    SPINE SURGERY  2018       Social History     Socioeconomic History    Marital status:    Tobacco Use    Smoking status: Some Days     Current packs/day: 0.00     Average packs/day: 0.2 packs/day for 41.5 years (10.4 ttl pk-yrs)     Types: Cigars, Cigarettes     Start date: 1982     Last attempt to quit: 2023     Years since quittin.0    Smokeless tobacco: Never   Substance and Sexual Activity    Alcohol use: Not Currently     Alcohol/week: 2.0 standard drinks of alcohol     Types: 2 Cans of beer per week    Drug use: Not Currently     Types: Oxycodone    Sexual activity: Not Currently     Partners: Female     Birth control/protection: None     Social Determinants of Health     Financial Resource Strain: Low Risk  (2023)    Overall Financial  Resource Strain (CARDIA)     Difficulty of Paying Living Expenses: Not very hard   Food Insecurity: No Food Insecurity (12/11/2023)    Hunger Vital Sign     Worried About Running Out of Food in the Last Year: Never true     Ran Out of Food in the Last Year: Never true   Transportation Needs: No Transportation Needs (12/11/2023)    PRAPARE - Transportation     Lack of Transportation (Medical): No     Lack of Transportation (Non-Medical): No   Physical Activity: Inactive (12/11/2023)    Exercise Vital Sign     Days of Exercise per Week: 0 days     Minutes of Exercise per Session: 0 min   Stress: No Stress Concern Present (12/11/2023)    [employer] [place] - Occupational Stress Questionnaire     Feeling of Stress : Only a little   Housing Stability: Low Risk  (12/11/2023)    Housing Stability Vital Sign     Unable to Pay for Housing in the Last Year: No     Number of Places Lived in the Last Year: 1     Unstable Housing in the Last Year: No         Scheduled Meds:   amLODIPine  10 mg Oral Daily    atorvastatin  20 mg Oral Nightly    carvediloL  25 mg Oral BID WM    cloNIDine  0.1 mg Oral TID    doxycycline  100 mg Oral Q12H    famotidine  20 mg Oral Daily    fluconazole (DIFLUCAN) IV (PEDS and ADULTS)  400 mg Intravenous Q24H    FLUoxetine  20 mg Oral Daily    furosemide (LASIX) injection  20 mg Intravenous Q12H    gabapentin  300 mg Oral TID    meropenem IV (PEDS and ADULTS)  1 g Intravenous Q8H    methocarbamoL  750 mg Oral BID    miconazole NITRATE 2 %   Topical (Top) BID    oxybutynin  5 mg Oral TID    rivaroxaban  20 mg Oral Daily with dinner    tobramycin IV (PEDS and ADULTS)  7 mg/kg (Adjusted) Intravenous Q24H     Continuous Infusions:  PRN Meds:  Current Facility-Administered Medications:     0.9%  NaCl infusion (for blood administration), , Intravenous, Q24H PRN    acetaminophen, 650 mg, Oral, Q8H PRN    acetaminophen, 650 mg, Oral, Q4H PRN    dextromethorphan-guaiFENesin  mg/5 ml,  5 mL, Oral, Q4H PRN    dextrose 10%, 12.5 g, Intravenous, PRN    dextrose 10%, 25 g, Intravenous, PRN    glucagon (human recombinant), 1 mg, Intramuscular, PRN    glucose, 16 g, Oral, PRN    glucose, 24 g, Oral, PRN    hydrALAZINE, 10 mg, Intravenous, Q4H PRN    hydrOXYzine pamoate, 50 mg, Oral, Nightly PRN    ondansetron, 4 mg, Intravenous, Q8H PRN    oxyCODONE-acetaminophen, 1 tablet, Oral, Q4H PRN    tobramycin - pharmacy to dose, , Intravenous, pharmacy to manage frequency    traZODone, 200 mg, Oral, Nightly PRN    Objective:  /68   Pulse (!) 58   Temp 97.6 °F (36.4 °C) (Oral)   Resp 18   Wt 63.5 kg (140 lb)   SpO2 (!) 94%   BMI 20.09 kg/m²     Physical Exam:   Physical Exam  Vitals reviewed.   Constitutional:       Appearance: He is ill-appearing.   HENT:      Head: Normocephalic.   Cardiovascular:      Rate and Rhythm: Normal rate and regular rhythm.   Pulmonary:      Effort: Pulmonary effort is normal. No respiratory distress.      Breath sounds: Normal breath sounds. No wheezing.   Abdominal:      General: Bowel sounds are normal. There is no distension.      Palpations: Abdomen is soft.      Tenderness: There is no abdominal tenderness.   Musculoskeletal:      Cervical back: Normal range of motion.      Comments: Paraplegia   Skin:     Findings: No rash.      Comments: Wounds dressed, wound vac in place   Neurological:      Mental Status: He is alert and oriented to person, place, and time.   Psychiatric:         Mood and Affect: Mood normal.         Behavior: Behavior normal.     Imaging    Lab Review   Recent Results (from the past 24 hour(s))   Basic Metabolic Panel    Collection Time: 08/26/24  4:21 AM   Result Value Ref Range    Sodium 141 136 - 145 mmol/L    Potassium 3.6 3.5 - 5.1 mmol/L    Chloride 102 98 - 107 mmol/L    CO2 28 23 - 31 mmol/L    Glucose 105 82 - 115 mg/dL    Blood Urea Nitrogen 7.4 (L) 8.4 - 25.7 mg/dL    Creatinine 0.86 0.73 - 1.18 mg/dL    BUN/Creatinine Ratio 9      Calcium 8.9 8.8 - 10.0 mg/dL    Anion Gap 11.0 mEq/L    eGFR >60 mL/min/1.73/m2   CBC with Differential    Collection Time: 08/26/24  4:21 AM   Result Value Ref Range    WBC 7.71 4.50 - 11.50 x10(3)/mcL    RBC 4.35 (L) 4.70 - 6.10 x10(6)/mcL    Hgb 11.0 (L) 14.0 - 18.0 g/dL    Hct 34.4 (L) 42.0 - 52.0 %    MCV 79.1 (L) 80.0 - 94.0 fL    MCH 25.3 (L) 27.0 - 31.0 pg    MCHC 32.0 (L) 33.0 - 36.0 g/dL    RDW 17.2 (H) 11.5 - 17.0 %    Platelet 638 (H) 130 - 400 x10(3)/mcL    MPV 9.3 7.4 - 10.4 fL    Neut % 54.1 %    Lymph % 32.0 %    Mono % 10.0 %    Eos % 3.1 %    Basophil % 0.4 %    Lymph # 2.47 0.6 - 4.6 x10(3)/mcL    Neut # 4.17 2.1 - 9.2 x10(3)/mcL    Mono # 0.77 0.1 - 1.3 x10(3)/mcL    Eos # 0.24 0 - 0.9 x10(3)/mcL    Baso # 0.03 <=0.2 x10(3)/mcL    IG# 0.03 0 - 0.04 x10(3)/mcL    IG% 0.4 %    NRBC% 0.0 %   TOBRAMYCIN, TROUGH    Collection Time: 08/26/24  4:58 PM   Result Value Ref Range    Tobramycin Trough 0.5 0.3 - 2.0 ug/ml       Assessment/Plan:  ESBL Ecoli Sepsis  Candidemia  ESBL Ecoli complicated UTI  ANTON  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  Paraplegia  DM Type II  Anemia  Reactive thrombocytosis  Sacral / L hip wound infection / abscess - GNR isolates  B/L pleural effusions    -Continue Merrem #4, Tobramycin #3 and Diflucan #6.   -Plan a 14 day course with end date 9/7  -Afebrile without leukocytosis  -U/A abnormal with urine culture >100k ESBL Ecoli  -Urology on board, inputs noted. Had some leaking from around suprapubic catheter. Balloon deflated to 10 mL and Ditropan increased  -Blood cultures with ESBL Ecoli 2/2 sets with Ecoli and Candida Albicans detected on BCID  -Repeat blood cultures negative  -Sacral wound cultures ESBL Ecoli, CR Pseudomonas and Trichosporon  -Continue wound care  -CXR and CTA Chest with B/L pleural effusions. Started on Lasix per primary  -Remains on chronic suppressive antibiotic therapy with Doxycycline for MRSA L ankle osteomyelitis with retained  hardware  -Discussed with patient, wife and nursing
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
## 2024-08-27 NOTE — NURSING
[facility] - 5th Floor Med Surg  Wound Care    Patient Name:  Bianca Khan   MRN:  [id number]  Date: 2024  Diagnosis: <principal problem not specified>    History:     Past Medical History:   Diagnosis Date    Arthritis     Chronic ulcer of ankle 2022    Frequent UTI 2019    Generalized anxiety disorder 2022    Neurogenic bladder 2022    Osteomyelitis 2022    Presence of suprapubic catheter 2022    Pure hypercholesterolemia 2022    Retention of urine, unspecified 2019    Spinal cord injury at T1-T6 level 2018       Social History     Socioeconomic History    Marital status:    Tobacco Use    Smoking status: Some Days     Current packs/day: 0.00     Average packs/day: 0.2 packs/day for 41.5 years (10.4 ttl pk-yrs)     Types: Cigars, Cigarettes     Start date: 1982     Last attempt to quit: 2023     Years since quittin.0    Smokeless tobacco: Never   Substance and Sexual Activity    Alcohol use: Not Currently     Alcohol/week: 2.0 standard drinks of alcohol     Types: 2 Cans of beer per week    Drug use: Not Currently     Types: Oxycodone    Sexual activity: Not Currently     Partners: Female     Birth control/protection: None     Social Determinants of Health     Financial Resource Strain: Low Risk  (2023)    Overall Financial Resource Strain (CARDIA)     Difficulty of Paying Living Expenses: Not very hard   Food Insecurity: No Food Insecurity (2023)    Hunger Vital Sign     Worried About Running Out of Food in the Last Year: Never true     Ran Out of Food in the Last Year: Never true   Transportation Needs: No Transportation Needs (2023)    PRAPARE - Transportation     Lack of Transportation (Medical): No     Lack of Transportation (Non-Medical): No   Physical Activity: Inactive (2023)    Exercise Vital Sign     Days of Exercise per Week: 0 days     Minutes of Exercise per Session: 0 min   Stress:  No Stress Concern Present (12/11/2023)    [employer] - Occupational Stress Questionnaire     Feeling of Stress : Only a little   Housing Stability: Low Risk  (12/11/2023)    Housing Stability Vital Sign     Unable to Pay for Housing in the Last Year: No     Number of Places Lived in the Last Year: 1     Unstable Housing in the Last Year: No       Precautions:     Allergies as of 08/20/2024 - Reviewed 08/20/2024   Allergen Reaction Noted    Baclofen Itching and Anxiety 06/17/2019       WO Assessment Details/Treatment      08/26/24 1056   Lui Risk Assessment   Sensory Perception 3-->slightly limited   Moisture 2-->very moist   Activity 1-->bedfast   Mobility 2-->very limited   Nutrition 3-->adequate   Friction and Shear 1-->problem   Lui Score 12   Pressure Injury Prevention    Check Medical Devices Done        Negative Pressure Wound Therapy  05/31/24   Placement Date: 05/31/24   Location: Sacral Spine   NPWT Type Vacuum Therapy   Therapy Setting NPWT Continuous therapy   Pressure Setting NPWT 125 mmHg   Therapy Interventions NPWT Dressing changed   Sponges Inserted NPWT Black;1  (versatel dressing)   Sponges Removed NPWT Black;1  (versatel dressing)        Altered Skin Integrity 01/09/24 0848 upper Sacral spine   Date First Assessed/Time First Assessed: 01/09/24 0848   Altered Skin Integrity Present on Admission - Did Patient arrive to the hospital with altered skin?: suspected hospital acquired  Orientation: upper  Location: Sacral spine   Wound Image    Description of Altered Skin Integrity Full thickness tissue loss. Subcutaneous fat may be visible but bone, tendon or muscle are not exposed   Dressing Appearance Intact;Moist drainage   Drainage Amount Small   Drainage Characteristics/Odor Serosanguineous;No odor   Appearance Red;Moist   Tissue loss description Full thickness   Red (%), Wound Tissue Color 100 %   Periwound Area Dry;Intact   Wound Edges Irregular   Wound Length  (cm) 7 cm   Wound Width (cm) 5 cm   Wound Depth (cm) 1.8 cm   Wound Volume (cm^3) 63 cm^3   Wound Surface Area (cm^2) 35 cm^2   Care Cleansed with:;Sterile normal saline   Dressing Changed        Wound 05/30/24 0000 Pressure Injury Right Buttocks   Date First Assessed/Time First Assessed: 05/30/24 0000   Primary Wound Type: Pressure Injury  Side: Right  Location: Buttocks  Is this injury device related?: No   Wound Image    Pressure Injury Stage 3   Dressing Appearance Intact;Moist drainage   Drainage Amount Small   Drainage Characteristics/Odor Serosanguineous;No odor   Appearance Red;Tan;Moist   Tissue loss description Full thickness   Red (%), Wound Tissue Color 80 %   Yellow (%), Wound Tissue Color 20 %   Periwound Area Dry;Intact   Wound Edges Irregular   Wound Length (cm) 5.6 cm   Wound Width (cm) 4.2 cm   Wound Depth (cm) 1.8 cm   Wound Volume (cm^3) 42.336 cm^3   Wound Surface Area (cm^2) 23.52 cm^2   Care Cleansed with:;Sterile normal saline   Dressing Changed   Skin Interventions   Skin Protection incontinence pads utilized;pectin skin barriers applied     Change out of wd vac sponges to both buttock wds.   Tolerated well.  No changes to care.  He remains on air fluidized bed and being turned q2hrs with wedge.    Care concerns with nurse Khalil.       08/27/2024

## 2024-08-27 NOTE — NURSING
Nurses Note -- 4 Eyes      8/27/2024   12:23 AM      Skin assessed during: Q Shift Change      [] No Altered Skin Integrity Present    []Prevention Measures Documented      [x] Yes- Altered Skin Integrity Present or Discovered   [] LDA Added if Not in Epic (Describe Wound)   [] New Altered Skin Integrity was Present on Admit and Documented in LDA   [] Wound Image Taken    Wound Care Consulted? Yes    Attending Nurse:  Rae Bernabe RN/Staff Member:   Remi Connolly

## 2024-08-27 NOTE — NURSING
[facility] - 5th Floor Med Surg  Wound Care    Patient Name:  Bianca Khan   MRN:  [id number]  Date: 2024  Diagnosis: <principal problem not specified>    History:     Past Medical History:   Diagnosis Date    Arthritis     Chronic ulcer of ankle 2022    Frequent UTI 2019    Generalized anxiety disorder 2022    Neurogenic bladder 2022    Osteomyelitis 2022    Presence of suprapubic catheter 2022    Pure hypercholesterolemia 2022    Retention of urine, unspecified 2019    Spinal cord injury at T1-T6 level 2018       Social History     Socioeconomic History    Marital status:    Tobacco Use    Smoking status: Some Days     Current packs/day: 0.00     Average packs/day: 0.2 packs/day for 41.5 years (10.4 ttl pk-yrs)     Types: Cigars, Cigarettes     Start date: 1982     Last attempt to quit: 2023     Years since quittin.0    Smokeless tobacco: Never   Substance and Sexual Activity    Alcohol use: Not Currently     Alcohol/week: 2.0 standard drinks of alcohol     Types: 2 Cans of beer per week    Drug use: Not Currently     Types: Oxycodone    Sexual activity: Not Currently     Partners: Female     Birth control/protection: None     Social Determinants of Health     Financial Resource Strain: Low Risk  (2023)    Overall Financial Resource Strain (CARDIA)     Difficulty of Paying Living Expenses: Not very hard   Food Insecurity: No Food Insecurity (2023)    Hunger Vital Sign     Worried About Running Out of Food in the Last Year: Never true     Ran Out of Food in the Last Year: Never true   Transportation Needs: No Transportation Needs (2023)    PRAPARE - Transportation     Lack of Transportation (Medical): No     Lack of Transportation (Non-Medical): No   Physical Activity: Inactive (2023)    Exercise Vital Sign     Days of Exercise per Week: 0 days     Minutes of Exercise per Session: 0 min   Stress:  No Stress Concern Present (12/11/2023)    [employer] [place] [employer] - Occupational Stress Questionnaire     Feeling of Stress : Only a little   Housing Stability: Low Risk  (12/11/2023)    Housing Stability Vital Sign     Unable to Pay for Housing in the Last Year: No     Number of Places Lived in the Last Year: 1     Unstable Housing in the Last Year: No       Precautions:     Allergies as of 08/20/2024 - Reviewed 08/20/2024   Allergen Reaction Noted    Baclofen Itching and Anxiety 06/17/2019       WOC Assessment Details/Treatment      08/27/24 0900        Wound 08/22/24 0800 Other (comment) Left Heel   Date First Assessed/Time First Assessed: 08/22/24 0800   Primary Wound Type: Other (comment)  Side: Left  Location: Heel   Wound Image    Dressing Appearance Intact;Moist drainage   Drainage Amount Small   Drainage Characteristics/Odor Sanguineous;No odor   Appearance   (partial eschar came off and now red bloody wd)   Tissue loss description Partial thickness   Red (%), Wound Tissue Color 100 %   Periwound Area Dry   Wound Edges Jagged   Wound Length (cm) 3 cm   Wound Width (cm) 3 cm   Wound Depth (cm) 0.2 cm   Wound Volume (cm^3) 1.8 cm^3   Wound Surface Area (cm^2) 9 cm^2   Care Cleansed with:;Antimicrobial agent   Dressing Gauze, wet to dry  (with vashe solution)   Safety Management   Safety Promotion/Fall Prevention assistive device/personal item within reach;Fall Risk signage in place;nonskid shoes/socks when out of bed;side rails raised x 3   Patient Rounds bed in low position;bed wheels locked;call light in patient/parent reach;clutter free environment maintained;ID band on;placement of personal items at bedside;toileting offered;visualized patient   Daily Care   Activity Management Rolling - L1   Activity Assistance Provided assistance, 1 person   Positioning   Body Position left;head facing, left   Head of Bed (HOB) Positioning HOB at 30-45 degrees   Positioning/Transfer Devices  pillows;wedge;in use   Hygiene Care   Bathing/Skin Care bath, complete;dressed/undressed;incontinence care;linen changed   Perineal Care absorbent brief/pad changed   Hygiene Assistance per care provider x 1     Noted scab to left heel has come off.  See photo.  Center area now open and bleeding.   Cleaned with NS then redressed wet to dry with vashe solution with new orders placed.   He tolerated well.   Spoke with his nurse Remi.   He remains on his air fluidized bed and without acute c/o.    Will continue to monitor heel with wd vac changes bi weekly.       08/27/2024

## 2024-08-27 NOTE — PLAN OF CARE
Spoke to patient about home ABX states that he usually goes to LT[facility] at AM instead of home ABX and would like to go back. Referral sent in [facility]

## 2024-08-27 NOTE — PLAN OF CARE
Problem: Adult Inpatient Plan of Care  Goal: Plan of Care Review  8/27/2024 0022 by Rae Townsend LPN  Outcome: Progressing  8/27/2024 0022 by Rae Townsend LPN  Outcome: Progressing  Goal: Patient-Specific Goal (Individualized)  8/27/2024 0022 by Rae Townsend LPN  Outcome: Progressing  8/27/2024 0022 by Rae Townsend LPN  Outcome: Progressing  Goal: Absence of Hospital-Acquired Illness or Injury  8/27/2024 0022 by Rae Townsend LPN  Outcome: Progressing  8/27/2024 0022 by Rae Townsend LPN  Outcome: Progressing  Goal: Optimal Comfort and Wellbeing  8/27/2024 0022 by Rae Townsend LPN  Outcome: Progressing  8/27/2024 0022 by Rae Townsend LPN  Outcome: Progressing  Goal: Readiness for Transition of Care  8/27/2024 0022 by Rae Townsend LPN  Outcome: Progressing  8/27/2024 0022 by Rae Townsend LPN  Outcome: Progressing     Problem: Wound  Goal: Optimal Coping  8/27/2024 0022 by Rae Townsend LPN  Outcome: Progressing  8/27/2024 0022 by Rae Townsend LPN  Outcome: Progressing  Goal: Optimal Functional Ability  8/27/2024 0022 by Rae Townsend LPN  Outcome: Progressing  8/27/2024 0022 by Rae Townsend LPN  Outcome: Progressing  Goal: Absence of Infection Signs and Symptoms  8/27/2024 0022 by Rae Townsend LPN  Outcome: Progressing  8/27/2024 0022 by Rae Townsend LPN  Outcome: Progressing  Goal: Improved Oral Intake  8/27/2024 0022 by Rae Townsend LPN  Outcome: Progressing  8/27/2024 0022 by Rae Townsend LPN  Outcome: Progressing  Goal: Optimal Pain Control and Function  8/27/2024 0022 by Rae Townsend LPN  Outcome: Progressing  8/27/2024 0022 by Rae Townsend LPN  Outcome: Progressing  Goal: Skin Health and Integrity  8/27/2024 0022 by Rae Townsend LPN  Outcome: Progressing  8/27/2024 0022 by Rae Townsend LPN  Outcome: Progressing  Goal: Optimal Wound Healing  8/27/2024 0022 by  Rae Townsend LPN  Outcome: Progressing  8/27/2024 0022 by Rae Townsend LPN  Outcome: Progressing     Problem: Sepsis/Septic Shock  Goal: Optimal Coping  8/27/2024 0022 by Rae Townsend LPN  Outcome: Progressing  8/27/2024 0022 by Rae Townsend LPN  Outcome: Progressing  Goal: Absence of Bleeding  8/27/2024 0022 by Rae Townsend LPN  Outcome: Progressing  8/27/2024 0022 by Rae Townsend LPN  Outcome: Progressing  Goal: Blood Glucose Level Within Targeted Range  8/27/2024 0022 by Rae Townsend LPN  Outcome: Progressing  8/27/2024 0022 by Rae Townsend LPN  Outcome: Progressing  Goal: Absence of Infection Signs and Symptoms  8/27/2024 0022 by Rae Townsend LPN  Outcome: Progressing  8/27/2024 0022 by Rae Townsend LPN  Outcome: Progressing  Goal: Optimal Nutrition Intake  8/27/2024 0022 by Rae Townsend LPN  Outcome: Progressing  8/27/2024 0022 by Rae Townsend LPN  Outcome: Progressing     Problem: Diabetes Comorbidity  Goal: Blood Glucose Level Within Targeted Range  8/27/2024 0022 by Rae Townsend LPN  Outcome: Progressing  8/27/2024 0022 by Rae Townsend LPN  Outcome: Progressing     Problem: Skin Injury Risk Increased  Goal: Skin Health and Integrity  8/27/2024 0022 by Rae Townsend LPN  Outcome: Progressing  8/27/2024 0022 by Rae Townsend LPN  Outcome: Progressing     Problem: Infection  Goal: Absence of Infection Signs and Symptoms  8/27/2024 0022 by Rae Townsend LPN  Outcome: Progressing  8/27/2024 0022 by Rae Townsend LPN  Outcome: Progressing
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
## 2024-08-27 NOTE — NURSING
Nurses Note -- 4 Eyes      8/27/2024   2:42 PM      Skin assessed during: Q Shift Change      [] No Altered Skin Integrity Present    []Prevention Measures Documented      [x] Yes- Altered Skin Integrity Present or Discovered   [] LDA Added if Not in Epic (Describe Wound)   [] New Altered Skin Integrity was Present on Admit and Documented in LDA   [] Wound Image Taken    Wound Care Consulted? Yes    Attending Nurse:  Remi Bernabe RN/Staff Member:   Rae Townsend

## 2024-08-28 LAB
ANION GAP SERPL CALC-SCNC: 11 MEQ/L
BASOPHILS # BLD AUTO: 0.03 X10(3)/MCL
BASOPHILS NFR BLD AUTO: 0.4 %
BUN SERPL-MCNC: 17.2 MG/DL (ref 8.4–25.7)
CALCIUM SERPL-MCNC: 8.7 MG/DL (ref 8.8–10)
CHLORIDE SERPL-SCNC: 100 MMOL/L (ref 98–107)
CO2 SERPL-SCNC: 28 MMOL/L (ref 23–31)
COLOR STL: ABNORMAL
CONSISTENCY STL: ABNORMAL
CREAT SERPL-MCNC: 1.04 MG/DL (ref 0.73–1.18)
CREAT/UREA NIT SERPL: 17
EOSINOPHIL # BLD AUTO: 0.23 X10(3)/MCL (ref 0–0.9)
EOSINOPHIL NFR BLD AUTO: 3.4 %
ERYTHROCYTE [DISTWIDTH] IN BLOOD BY AUTOMATED COUNT: 17.2 % (ref 11.5–17)
GFR SERPLBLD CREATININE-BSD FMLA CKD-EPI: >60 ML/MIN/1.73/M2
GLUCOSE SERPL-MCNC: 153 MG/DL (ref 82–115)
HCT VFR BLD AUTO: 32.9 % (ref 42–52)
HEMOCCULT SP1 STL QL: POSITIVE
HGB BLD-MCNC: 10.3 G/DL (ref 14–18)
IMM GRANULOCYTES # BLD AUTO: 0.04 X10(3)/MCL (ref 0–0.04)
IMM GRANULOCYTES NFR BLD AUTO: 0.6 %
LYMPHOCYTES # BLD AUTO: 2.28 X10(3)/MCL (ref 0.6–4.6)
LYMPHOCYTES NFR BLD AUTO: 34.1 %
MCH RBC QN AUTO: 25.7 PG (ref 27–31)
MCHC RBC AUTO-ENTMCNC: 31.3 G/DL (ref 33–36)
MCV RBC AUTO: 82 FL (ref 80–94)
MONOCYTES # BLD AUTO: 0.78 X10(3)/MCL (ref 0.1–1.3)
MONOCYTES NFR BLD AUTO: 11.7 %
NEUTROPHILS # BLD AUTO: 3.33 X10(3)/MCL (ref 2.1–9.2)
NEUTROPHILS NFR BLD AUTO: 49.8 %
NRBC BLD AUTO-RTO: 0 %
PLATELET # BLD AUTO: 602 X10(3)/MCL (ref 130–400)
PMV BLD AUTO: 9.4 FL (ref 7.4–10.4)
POTASSIUM SERPL-SCNC: 3.6 MMOL/L (ref 3.5–5.1)
RBC # BLD AUTO: 4.01 X10(6)/MCL (ref 4.7–6.1)
SODIUM SERPL-SCNC: 139 MMOL/L (ref 136–145)
TOBRAMYCIN TROUGH SERPL-MCNC: 1.4 UG/ML (ref 0.3–2)
WBC # BLD AUTO: 6.69 X10(3)/MCL (ref 4.5–11.5)

## 2024-08-28 PROCEDURE — C1751 CATH, INF, PER/CENT/MIDLINE: HCPCS

## 2024-08-28 PROCEDURE — 82272 OCCULT BLD FECES 1-3 TESTS: CPT | Performed by: PHYSICIAN ASSISTANT

## 2024-08-28 PROCEDURE — A4216 STERILE WATER/SALINE, 10 ML: HCPCS | Performed by: NURSE PRACTITIONER

## 2024-08-28 PROCEDURE — 25000003 PHARM REV CODE 250: Performed by: NURSE PRACTITIONER

## 2024-08-28 PROCEDURE — 76937 US GUIDE VASCULAR ACCESS: CPT

## 2024-08-28 PROCEDURE — 25000003 PHARM REV CODE 250: Performed by: INTERNAL MEDICINE

## 2024-08-28 PROCEDURE — 21400001 HC TELEMETRY ROOM

## 2024-08-28 PROCEDURE — 80048 BASIC METABOLIC PNL TOTAL CA: CPT | Performed by: INTERNAL MEDICINE

## 2024-08-28 PROCEDURE — 27000207 HC ISOLATION

## 2024-08-28 PROCEDURE — 63600175 PHARM REV CODE 636 W HCPCS: Performed by: INTERNAL MEDICINE

## 2024-08-28 PROCEDURE — 80200 ASSAY OF TOBRAMYCIN: CPT | Performed by: INTERNAL MEDICINE

## 2024-08-28 PROCEDURE — 36410 VNPNXR 3YR/> PHY/QHP DX/THER: CPT

## 2024-08-28 PROCEDURE — 36415 COLL VENOUS BLD VENIPUNCTURE: CPT | Performed by: INTERNAL MEDICINE

## 2024-08-28 PROCEDURE — 63600175 PHARM REV CODE 636 W HCPCS: Performed by: NURSE PRACTITIONER

## 2024-08-28 PROCEDURE — 85025 COMPLETE CBC W/AUTO DIFF WBC: CPT | Performed by: INTERNAL MEDICINE

## 2024-08-28 RX ORDER — SODIUM CHLORIDE 0.9 % (FLUSH) 0.9 %
10 SYRINGE (ML) INJECTION
Status: DISCONTINUED | OUTPATIENT
Start: 2024-08-28 | End: 2024-08-30 | Stop reason: HOSPADM

## 2024-08-28 RX ORDER — SODIUM CHLORIDE 0.9 % (FLUSH) 0.9 %
10 SYRINGE (ML) INJECTION EVERY 6 HOURS
Status: DISCONTINUED | OUTPATIENT
Start: 2024-08-28 | End: 2024-08-30 | Stop reason: HOSPADM

## 2024-08-28 RX ADMIN — FLUCONAZOLE 400 MG: 2 INJECTION, SOLUTION INTRAVENOUS at 08:08

## 2024-08-28 RX ADMIN — CLONIDINE HYDROCHLORIDE 0.1 MG: 0.1 TABLET ORAL at 09:08

## 2024-08-28 RX ADMIN — FUROSEMIDE 20 MG: 10 INJECTION, SOLUTION INTRAMUSCULAR; INTRAVENOUS at 08:08

## 2024-08-28 RX ADMIN — MICONAZOLE NITRATE 2 % TOPICAL POWDER: at 08:08

## 2024-08-28 RX ADMIN — FUROSEMIDE 20 MG: 10 INJECTION, SOLUTION INTRAMUSCULAR; INTRAVENOUS at 10:08

## 2024-08-28 RX ADMIN — CLONIDINE HYDROCHLORIDE 0.1 MG: 0.1 TABLET ORAL at 08:08

## 2024-08-28 RX ADMIN — TOBRAMYCIN SULFATE 320 MG: 40 INJECTION, SOLUTION INTRAMUSCULAR; INTRAVENOUS at 09:08

## 2024-08-28 RX ADMIN — CLONIDINE HYDROCHLORIDE 0.1 MG: 0.1 TABLET ORAL at 05:08

## 2024-08-28 RX ADMIN — MEROPENEM 1 G: 1 INJECTION, POWDER, FOR SOLUTION INTRAVENOUS at 05:08

## 2024-08-28 RX ADMIN — DOXYCYCLINE HYCLATE 100 MG: 100 TABLET, COATED ORAL at 09:08

## 2024-08-28 RX ADMIN — CARVEDILOL 25 MG: 12.5 TABLET, FILM COATED ORAL at 05:08

## 2024-08-28 RX ADMIN — ATORVASTATIN CALCIUM 20 MG: 10 TABLET, FILM COATED ORAL at 08:08

## 2024-08-28 RX ADMIN — OXYBUTYNIN CHLORIDE 5 MG: 5 TABLET ORAL at 05:08

## 2024-08-28 RX ADMIN — OXYCODONE HYDROCHLORIDE AND ACETAMINOPHEN 1 TABLET: 10; 325 TABLET ORAL at 05:08

## 2024-08-28 RX ADMIN — GABAPENTIN 300 MG: 300 CAPSULE ORAL at 09:08

## 2024-08-28 RX ADMIN — AMLODIPINE BESYLATE 10 MG: 5 TABLET ORAL at 09:08

## 2024-08-28 RX ADMIN — FAMOTIDINE 20 MG: 20 TABLET, FILM COATED ORAL at 09:08

## 2024-08-28 RX ADMIN — SODIUM CHLORIDE, PRESERVATIVE FREE 10 ML: 5 INJECTION INTRAVENOUS at 05:08

## 2024-08-28 RX ADMIN — HYDROXYZINE PAMOATE 50 MG: 50 CAPSULE ORAL at 09:08

## 2024-08-28 RX ADMIN — OXYBUTYNIN CHLORIDE 5 MG: 5 TABLET ORAL at 09:08

## 2024-08-28 RX ADMIN — FLUOXETINE HYDROCHLORIDE 20 MG: 20 CAPSULE ORAL at 09:08

## 2024-08-28 RX ADMIN — GABAPENTIN 300 MG: 300 CAPSULE ORAL at 05:08

## 2024-08-28 RX ADMIN — TRAZODONE HYDROCHLORIDE 200 MG: 100 TABLET ORAL at 09:08

## 2024-08-28 RX ADMIN — OXYCODONE HYDROCHLORIDE AND ACETAMINOPHEN 1 TABLET: 10; 325 TABLET ORAL at 09:08

## 2024-08-28 RX ADMIN — CARVEDILOL 25 MG: 12.5 TABLET, FILM COATED ORAL at 07:08

## 2024-08-28 RX ADMIN — OXYBUTYNIN CHLORIDE 5 MG: 5 TABLET ORAL at 08:08

## 2024-08-28 RX ADMIN — GABAPENTIN 300 MG: 300 CAPSULE ORAL at 08:08

## 2024-08-28 RX ADMIN — METHOCARBAMOL 750 MG: 750 TABLET ORAL at 08:08

## 2024-08-28 RX ADMIN — METHOCARBAMOL 750 MG: 750 TABLET ORAL at 09:08

## 2024-08-28 RX ADMIN — MICONAZOLE NITRATE 2 % TOPICAL POWDER: at 09:08

## 2024-08-28 RX ADMIN — MEROPENEM 1 G: 1 INJECTION, POWDER, FOR SOLUTION INTRAVENOUS at 09:08

## 2024-08-28 RX ADMIN — SODIUM CHLORIDE, PRESERVATIVE FREE 10 ML: 5 INJECTION INTRAVENOUS at 06:08

## 2024-08-28 RX ADMIN — DOXYCYCLINE HYCLATE 100 MG: 100 TABLET, COATED ORAL at 08:08
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
## 2024-08-28 NOTE — NURSING
Nurses Note -- 4 Eyes      8/27/2024   11:55 PM      Skin assessed during: Q Shift Change      [] No Altered Skin Integrity Present    []Prevention Measures Documented      [x] Yes- Altered Skin Integrity Present or Discovered   [] LDA Added if Not in Epic (Describe Wound)   [] New Altered Skin Integrity was Present on Admit and Documented in LDA   [] Wound Image Taken    Wound Care Consulted? Yes    Attending Nurse:  Sheryl URBANO    Second RN/Staff Member:   Remi URBANO

## 2024-08-28 NOTE — PROCEDURES
"Bianca Khan is a 59 y.o. male patient.    Temp: 97.9 °F (36.6 °C) (08/27/24 2328)  Pulse: 61 (08/27/24 2328)  Resp: 19 (08/27/24 2223)  BP: (!) 147/85 (08/27/24 2328)  SpO2: 98 % (08/27/24 2328)  Weight: 63.5 kg (139 lb 15.9 oz) (08/27/24 1507)  Height: 5' 10" (177.8 cm) (08/27/24 1507)    PICC  Date/Time: 8/28/2024 2:52 AM  Performed by: Reddy Pichardo RN  Consent Done: Yes  Time out: Immediately prior to procedure a time out was called to verify the correct patient, procedure, equipment, support staff and site/side marked as required  Indications: med administration and vascular access  Anesthesia: local infiltration  Local anesthetic: lidocaine 1% without epinephrine  Anesthetic Total (mL): 5  Preparation: skin prepped with ChloraPrep  Skin prep agent dried: skin prep agent completely dried prior to procedure  Sterile barriers: all five maximum sterile barriers used - cap, mask, sterile gown, sterile gloves, and large sterile sheet  Hand hygiene: hand hygiene performed prior to central venous catheter insertion  Location details: left brachial  Catheter type: single lumen  Catheter size: 4 Fr  Catheter Length: 15cm    Ultrasound guidance: yes  Vessel Caliber: medium and patent, compressibility normal  Needle advanced into vessel with real time Ultrasound guidance.  Guidewire confirmed in vessel.  Sterile sheath used.  Number of attempts: 1  Post-procedure: blood return through all ports and sterile dressing applied    Complications: none  Comments: refugio Pichardo RN  8/28/2024    "
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
## 2024-08-28 NOTE — PLAN OF CARE
Problem: Adult Inpatient Plan of Care  Goal: Plan of Care Review  Outcome: Progressing  Goal: Patient-Specific Goal (Individualized)  Outcome: Progressing  Goal: Absence of Hospital-Acquired Illness or Injury  Outcome: Progressing  Goal: Optimal Comfort and Wellbeing  Outcome: Progressing  Goal: Readiness for Transition of Care  Outcome: Progressing     Problem: Wound  Goal: Optimal Coping  Outcome: Progressing  Goal: Optimal Functional Ability  Outcome: Progressing  Goal: Absence of Infection Signs and Symptoms  Outcome: Progressing  Goal: Improved Oral Intake  Outcome: Progressing  Goal: Optimal Pain Control and Function  Outcome: Progressing  Goal: Skin Health and Integrity  Outcome: Progressing  Goal: Optimal Wound Healing  Outcome: Progressing     Problem: Sepsis/Septic Shock  Goal: Optimal Coping  Outcome: Progressing  Goal: Absence of Bleeding  Outcome: Progressing  Goal: Blood Glucose Level Within Targeted Range  Outcome: Progressing  Goal: Absence of Infection Signs and Symptoms  Outcome: Progressing  Goal: Optimal Nutrition Intake  Outcome: Progressing     Problem: Diabetes Comorbidity  Goal: Blood Glucose Level Within Targeted Range  Outcome: Progressing     Problem: Skin Injury Risk Increased  Goal: Skin Health and Integrity  Outcome: Progressing     Problem: Infection  Goal: Absence of Infection Signs and Symptoms  Outcome: Progressing     Problem: Fall Injury Risk  Goal: Absence of Fall and Fall-Related Injury  Outcome: Progressing

## 2024-08-28 NOTE — NURSING
Nurses Note -- 4 Eyes      8/28/2024   6:44 PM      Skin assessed during: Q Shift Change      [] No Altered Skin Integrity Present    []Prevention Measures Documented      [x] Yes- Altered Skin Integrity Present or Discovered   [] LDA Added if Not in Epic (Describe Wound)   [] New Altered Skin Integrity was Present on Admit and Documented in LDA   [] Wound Image Taken    Wound Care Consulted? Yes    Attending Nurse:  Virginia Bernabe RN/Staff Member:  JULY

## 2024-08-28 NOTE — PROGRESS NOTES
Infectious Diseases Progress Note  58 y/o male with past medical history of T-spine cord injury with paraplegia, diabetes, HTN, hepatitis-C, chronic MRSA L ankle wound/osteomyelitis, was on suppressive doxycycline and R knee infection/septic arthritis and osteomyelitis with GBS/Enterococcus and Ecoli isolates who presented to ER on 8/20 with fevers, cough and body aches. On admit he was noted to be febrile with leukocytosis, WBC 17.25. Blood cultures revealed ESLB Ecoli with Ecoli and Candida Albicans detected on BCID. U/A with 1+ nitrites, 500 LE, 50-99 RBC, >100 WBC, occasional bacteria, budding yeast. Urine culture >100k ESBL Ecoli. Wound culture revealing ESBL Ecoli, CR Pseudomonas and Trichosporon isolates.  and .6. MRSA/MSSA PCR (-). CXR unremarkable.   He is currently on Merrem, Tobramycin and Diflucan    Subjective:  Lying in bed, no acute distress. No new complaints voiced. Afebrile. Wife present     ROS  Constitutional:  Positive for malaise/fatigue.   HENT: Negative.     Eyes: Negative.    Respiratory: Negative.     Cardiovascular: Negative.    Gastrointestinal: Negative.    Musculoskeletal: Negative.    Skin:         Multiple wounds   Neurological:  Positive for focal weakness and weakness.   All other systems reviewed and are negative.    Review of patient's allergies indicates:   Allergen Reactions    Baclofen Itching and Anxiety       Past Medical History:   Diagnosis Date    Arthritis     Chronic ulcer of ankle 05/26/2022    Frequent UTI 07/02/2019    Generalized anxiety disorder 05/26/2022    Neurogenic bladder 05/26/2022    Osteomyelitis 05/26/2022    Presence of suprapubic catheter 05/26/2022    Pure hypercholesterolemia 05/26/2022    Retention of urine, unspecified 08/09/2019    Spinal cord injury at T1-T6 level 04/20/2018       Past Surgical History:   Procedure Laterality Date    FRACTURE SURGERY  2021    INCISION AND DRAINAGE, LOWER EXTREMITY Right 06/29/2023    Procedure:  INCISION AND DRAINAGE, LOWER EXTREMITY;  Surgeon: Prabhu Shen DO;  Location: [facility] OR;  Service: Orthopedics;  Laterality: Right;  supine bone foam wash stuff cultures    INCISION AND DRAINAGE, LOWER EXTREMITY Right 2023    Procedure: INCISION AND DRAINAGE, LOWER EXTREMITY;  Surgeon: Prabhu Shen DO;  Location: [facility] OR;  Service: Orthopedics;  Laterality: Right;  supine any table bone foam wash stuff possible wound vac    INCISION AND DRAINAGE, LOWER EXTREMITY Right 2023    Procedure: INCISION AND DRAINAGE, LOWER EXTREMITY;  Surgeon: Prabhu Shen DO;  Location: [facility] OR;  Service: Orthopedics;  Laterality: Right;  supine bone foam vascular bed removal of distal femoral plate, wash stuff, possible AKA    INSERTION OF INTRAMEDULLARY MARISA Right 08/10/2022    Procedure: INSERTION, INTRAMEDULLARY MARISA RIGHT TIBIA;  Surgeon: Jorge Orellana MD;  Location: [facility] OR;  Service: Orthopedics;  Laterality: Right;    JOINT REPLACEMENT      Ankel    REMOVAL OF HARDWARE FROM LOWER EXTREMITY Right 2023    Procedure: REMOVAL, HARDWARE, LOWER EXTREMITY;  Surgeon: Prabhu Shen DO;  Location: [facility] OR;  Service: Orthopedics;  Laterality: Right;    SPINE SURGERY  2018       Social History     Socioeconomic History    Marital status:    Tobacco Use    Smoking status: Some Days     Current packs/day: 0.00     Average packs/day: 0.2 packs/day for 41.5 years (10.4 ttl pk-yrs)     Types: Cigars, Cigarettes     Start date: 1982     Last attempt to quit: 2023     Years since quittin.0    Smokeless tobacco: Never   Substance and Sexual Activity    Alcohol use: Not Currently     Alcohol/week: 2.0 standard drinks of alcohol     Types: 2 Cans of beer per week    Drug use: Not Currently     Types: Oxycodone    Sexual activity: Not Currently     Partners: Female     Birth control/protection: None     Social Determinants of Health     Financial Resource Strain: Low Risk  (2023)    Overall  Financial Resource Strain (CARDIA)     Difficulty of Paying Living Expenses: Not very hard   Food Insecurity: No Food Insecurity (12/11/2023)    Hunger Vital Sign     Worried About Running Out of Food in the Last Year: Never true     Ran Out of Food in the Last Year: Never true   Transportation Needs: No Transportation Needs (12/11/2023)    PRAPARE - Transportation     Lack of Transportation (Medical): No     Lack of Transportation (Non-Medical): No   Physical Activity: Inactive (12/11/2023)    Exercise Vital Sign     Days of Exercise per Week: 0 days     Minutes of Exercise per Session: 0 min   Stress: No Stress Concern Present (12/11/2023)    [employer] - Occupational Stress Questionnaire     Feeling of Stress : Only a little   Housing Stability: Low Risk  (12/11/2023)    Housing Stability Vital Sign     Unable to Pay for Housing in the Last Year: No     Number of Places Lived in the Last Year: 1     Unstable Housing in the Last Year: No         Scheduled Meds:   amLODIPine  10 mg Oral Daily    atorvastatin  20 mg Oral Nightly    carvediloL  25 mg Oral BID WM    cloNIDine  0.1 mg Oral TID    doxycycline  100 mg Oral Q12H    famotidine  20 mg Oral Daily    fluconazole (DIFLUCAN) IV (PEDS and ADULTS)  400 mg Intravenous Q24H    FLUoxetine  20 mg Oral Daily    furosemide (LASIX) injection  20 mg Intravenous Q12H    gabapentin  300 mg Oral TID    meropenem IV (PEDS and ADULTS)  1 g Intravenous Q8H    methocarbamoL  750 mg Oral BID    miconazole NITRATE 2 %   Topical (Top) BID    oxybutynin  5 mg Oral TID    sodium chloride 0.9%  10 mL Intravenous Q6H    tobramycin IV (PEDS and ADULTS)  7 mg/kg (Adjusted) Intravenous Q24H     Continuous Infusions:  PRN Meds:  Current Facility-Administered Medications:     0.9%  NaCl infusion (for blood administration), , Intravenous, Q24H PRN    acetaminophen, 650 mg, Oral, Q8H PRN    acetaminophen, 650 mg, Oral, Q4H PRN    dextromethorphan-guaiFENesin  " mg/5 ml, 5 mL, Oral, Q4H PRN    dextrose 10%, 12.5 g, Intravenous, PRN    dextrose 10%, 25 g, Intravenous, PRN    glucagon (human recombinant), 1 mg, Intramuscular, PRN    glucose, 16 g, Oral, PRN    glucose, 24 g, Oral, PRN    hydrALAZINE, 10 mg, Intravenous, Q4H PRN    hydrOXYzine pamoate, 50 mg, Oral, Nightly PRN    ondansetron, 4 mg, Intravenous, Q8H PRN    oxyCODONE-acetaminophen, 1 tablet, Oral, Q4H PRN    Flushing PICC/Midline Protocol, , , Until Discontinued **AND** sodium chloride 0.9%, 10 mL, Intravenous, Q6H **AND** sodium chloride 0.9%, 10 mL, Intravenous, PRN    tobramycin - pharmacy to dose, , Intravenous, pharmacy to manage frequency    traZODone, 200 mg, Oral, Nightly PRN    Objective:  /76   Pulse 61   Temp 97.8 °F (36.6 °C) (Oral)   Resp 18   Ht 5' 10" (1.778 m)   Wt 63.5 kg (139 lb 15.9 oz)   SpO2 96%   BMI 20.09 kg/m²     Physical Exam:   Physical Exam  Vitals reviewed.   Constitutional:       Appearance: He is ill-appearing.   HENT:      Head: Normocephalic.   Cardiovascular:      Rate and Rhythm: Normal rate and regular rhythm.   Pulmonary:      Effort: Pulmonary effort is normal. No respiratory distress.      Breath sounds: Normal breath sounds. No wheezing.   Abdominal:      General: Bowel sounds are normal. There is no distension.      Palpations: Abdomen is soft.      Tenderness: There is no abdominal tenderness.   Musculoskeletal:      Cervical back: Normal range of motion.      Comments: Paraplegia   Skin:     Findings: No rash.      Comments: Wounds dressed, wound vac in place   Neurological:      Mental Status: He is alert and oriented to person, place, and time.   Psychiatric:         Mood and Affect: Mood normal.         Behavior: Behavior normal.     Imaging    Lab Review   Recent Results (from the past 24 hour(s))   Basic Metabolic Panel    Collection Time: 08/28/24  3:50 AM   Result Value Ref Range    Sodium 139 136 - 145 mmol/L    Potassium 3.6 3.5 - 5.1 mmol/L "    Chloride 100 98 - 107 mmol/L    CO2 28 23 - 31 mmol/L    Glucose 153 (H) 82 - 115 mg/dL    Blood Urea Nitrogen 17.2 8.4 - 25.7 mg/dL    Creatinine 1.04 0.73 - 1.18 mg/dL    BUN/Creatinine Ratio 17     Calcium 8.7 (L) 8.8 - 10.0 mg/dL    Anion Gap 11.0 mEq/L    eGFR >60 mL/min/1.73/m2   CBC with Differential    Collection Time: 08/28/24  3:50 AM   Result Value Ref Range    WBC 6.69 4.50 - 11.50 x10(3)/mcL    RBC 4.01 (L) 4.70 - 6.10 x10(6)/mcL    Hgb 10.3 (L) 14.0 - 18.0 g/dL    Hct 32.9 (L) 42.0 - 52.0 %    MCV 82.0 80.0 - 94.0 fL    MCH 25.7 (L) 27.0 - 31.0 pg    MCHC 31.3 (L) 33.0 - 36.0 g/dL    RDW 17.2 (H) 11.5 - 17.0 %    Platelet 602 (H) 130 - 400 x10(3)/mcL    MPV 9.4 7.4 - 10.4 fL    Neut % 49.8 %    Lymph % 34.1 %    Mono % 11.7 %    Eos % 3.4 %    Basophil % 0.4 %    Lymph # 2.28 0.6 - 4.6 x10(3)/mcL    Neut # 3.33 2.1 - 9.2 x10(3)/mcL    Mono # 0.78 0.1 - 1.3 x10(3)/mcL    Eos # 0.23 0 - 0.9 x10(3)/mcL    Baso # 0.03 <=0.2 x10(3)/mcL    IG# 0.04 0 - 0.04 x10(3)/mcL    IG% 0.6 %    NRBC% 0.0 %   Occult Blood, Stool 1st Specimen    Collection Time: 08/28/24 10:09 AM   Result Value Ref Range    Stool Color 1 Black     Stool Consistancy 1 formed     Occult Blood Stool 1 Positive (A) Negative   TOBRAMYCIN, TROUGH    Collection Time: 08/28/24  4:21 PM   Result Value Ref Range    Tobramycin Trough 1.4 0.3 - 2.0 ug/ml       Assessment/Plan:  ESBL Ecoli Sepsis  Candidemia  ESBL Ecoli complicated UTI  ANTON  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  Paraplegia  DM Type II  Anemia  Reactive thrombocytosis  Sacral / L hip wound infection / abscess - GNR isolates  B/L pleural effusions    -Continue Merrem #6, Tobramycin #5 and Diflucan #8.   -Plan a 14 day course with end date 9/7  -Afebrile without leukocytosis  -U/A abnormal with urine culture >100k ESBL Ecoli  -Urology on board, inputs noted. Had some leaking from around suprapubic catheter. Balloon deflated to 10 mL and Ditropan increased  -Blood  cultures with ESBL Ecoli 2/2 sets with Ecoli and Candida Albicans detected on BCID  -Repeat blood cultures negative  -Sacral wound cultures ESBL Ecoli, CR Pseudomonas and Trichosporon  -Continue wound care  -CXR and CTA Chest with B/L pleural effusions. Started on Lasix per primary  -Remains on chronic suppressive antibiotic therapy with Doxycycline for MRSA L ankle osteomyelitis with retained hardware  -Discussed with patient, wife and nursing. CM working on LTAC placement, likely [facility] on Friday

## 2024-08-28 NOTE — PROGRESS NOTES
Pharmacokinetic Follow Up: Tobramycin    Assessment of levels:     Trough of 1.4 mcg/ml is above goal of 1 mcg/ml.    Regimen Plan:     Decrease to 5mg/kg  --320 mg q24h.    Recent Labs   Lab Result Units 08/26/24  1658 08/28/24  1621   Tobramycin Trough ug/ml 0.5 1.4       Patient brief summary:  Bianca Khan is a 59 y.o. male initiated on aminoglycoside therapy for treatment of skin & soft tissue infection    Drug Allergies:   Review of patient's allergies indicates:   Allergen Reactions    Baclofen Itching and Anxiety     Renal Function:   Estimated Creatinine Clearance: 67.8 mL/min (based on SCr of 1.04 mg/dL).,     Dialysis Method (if applicable):  N/A    CBC (last 72 hours):  Recent Labs   Lab Result Units 08/26/24  0421 08/27/24  0408 08/28/24  0350   WBC x10(3)/mcL 7.71 7.46 6.69   Hgb g/dL 11.0* 10.8* 10.3*   Hct % 34.4* 33.9* 32.9*   Platelet x10(3)/mcL 638* 609* 602*   Mono % % 10.0 8.8 11.7   Eos % % 3.1 3.2 3.4   Basophil % % 0.4 0.4 0.4       Metabolic Panel (last 72 hours):  Recent Labs   Lab Result Units 08/26/24  0421 08/27/24  0408 08/28/24  0350   Sodium mmol/L 141 140 139   Potassium mmol/L 3.6 3.6 3.6   Chloride mmol/L 102 102 100   CO2 mmol/L 28 28 28   Glucose mg/dL 105 122* 153*   Blood Urea Nitrogen mg/dL 7.4* 10.8 17.2   Creatinine mg/dL 0.86 0.82 1.04       Aminoglycoside Administrations:  aminoglycosides given in last 96 hours                     tobramycin (NEBCIN) 445 mg in D5W 100 mL IVPB (mg) 445 mg New Bag 08/27/24 1752     445 mg New Bag 08/26/24 1805     445 mg New Bag 08/25/24 1759                    Microbiologic Results:  Microbiology Results (last 7 days)       Procedure Component Value Units Date/Time    Blood Culture [[id number]]  (Normal) Collected: 08/21/24 1644    Order Status: Completed Specimen: Blood, Venous Updated: 08/26/24 1900     Blood Culture No Growth at 5 days    Blood Culture [[id number]]  (Normal) Collected: 08/21/24 1644    Order Status: Completed Specimen:  Blood, Venous Updated: 08/26/24 1900     Blood Culture No Growth at 5 days    Blood Culture #1 **CANNOT BE ORDERED STAT** [[id number]]  (Normal) Collected: 08/20/24 1232    Order Status: Completed Specimen: Blood Updated: 08/25/24 1300     Blood Culture No Growth at 5 days    Wound Culture [[id number]]  (Abnormal)  (Susceptibility) Collected: 08/20/24 1527    Order Status: Completed Specimen: Abscess from Sacral Updated: 08/24/24 1134     Wound Culture Few Escherichia coli ESBL      Few Pseudomonas aeruginosa     Comment: CRPA (Carbapenem-resistant Pseudomonas areuginosa)         Few Trichosporon asahii    Blood Culture #2 **CANNOT BE ORDERED STAT** [[id number]]  (Abnormal)  (Susceptibility) Collected: 08/20/24 1540    Order Status: Completed Specimen: Blood Updated: 08/23/24 1237     Blood Culture Escherichia coli ESBL     GRAM STAIN Gram Negative Rods      Seen in gram stain of broth only      2 of 2 bottles positive    Urine culture [[id number]]  (Abnormal)  (Susceptibility) Collected: 08/20/24 1338    Order Status: Completed Specimen: Urine Updated: 08/23/24 0743     Urine Culture >/= 100,000 colonies/ml Escherichia coli ESBL

## 2024-08-29 ENCOUNTER — ANESTHESIA (OUTPATIENT)
Dept: ENDOSCOPY | Facility: HOSPITAL | Age: 59
End: 2024-08-29
Payer: MEDICARE

## 2024-08-29 ENCOUNTER — ANESTHESIA EVENT (OUTPATIENT)
Dept: ENDOSCOPY | Facility: HOSPITAL | Age: 59
End: 2024-08-29
Payer: MEDICARE

## 2024-08-29 LAB
BASOPHILS # BLD AUTO: 0.02 X10(3)/MCL
BASOPHILS NFR BLD AUTO: 0.3 %
CREAT SERPL-MCNC: 1.29 MG/DL (ref 0.73–1.18)
EOSINOPHIL # BLD AUTO: 0.23 X10(3)/MCL (ref 0–0.9)
EOSINOPHIL NFR BLD AUTO: 3.1 %
ERYTHROCYTE [DISTWIDTH] IN BLOOD BY AUTOMATED COUNT: 17.2 % (ref 11.5–17)
GFR SERPLBLD CREATININE-BSD FMLA CKD-EPI: >60 ML/MIN/1.73/M2
HCT VFR BLD AUTO: 33.5 % (ref 42–52)
HGB BLD-MCNC: 10.4 G/DL (ref 14–18)
IMM GRANULOCYTES # BLD AUTO: 0.03 X10(3)/MCL (ref 0–0.04)
IMM GRANULOCYTES NFR BLD AUTO: 0.4 %
LYMPHOCYTES # BLD AUTO: 2.83 X10(3)/MCL (ref 0.6–4.6)
LYMPHOCYTES NFR BLD AUTO: 38.7 %
MCH RBC QN AUTO: 25.3 PG (ref 27–31)
MCHC RBC AUTO-ENTMCNC: 31 G/DL (ref 33–36)
MCV RBC AUTO: 81.5 FL (ref 80–94)
MONOCYTES # BLD AUTO: 0.75 X10(3)/MCL (ref 0.1–1.3)
MONOCYTES NFR BLD AUTO: 10.2 %
NEUTROPHILS # BLD AUTO: 3.46 X10(3)/MCL (ref 2.1–9.2)
NEUTROPHILS NFR BLD AUTO: 47.3 %
NRBC BLD AUTO-RTO: 0 %
PLATELET # BLD AUTO: 648 X10(3)/MCL (ref 130–400)
PMV BLD AUTO: 9.2 FL (ref 7.4–10.4)
RBC # BLD AUTO: 4.11 X10(6)/MCL (ref 4.7–6.1)
TOBRAMYCIN TROUGH SERPL-MCNC: 1.7 UG/ML (ref 0.3–2)
WBC # BLD AUTO: 7.32 X10(3)/MCL (ref 4.5–11.5)

## 2024-08-29 PROCEDURE — 21400001 HC TELEMETRY ROOM

## 2024-08-29 PROCEDURE — 63600175 PHARM REV CODE 636 W HCPCS: Performed by: NURSE ANESTHETIST, CERTIFIED REGISTERED

## 2024-08-29 PROCEDURE — 0DB68ZX EXCISION OF STOMACH, VIA NATURAL OR ARTIFICIAL OPENING ENDOSCOPIC, DIAGNOSTIC: ICD-10-PCS | Performed by: STUDENT IN AN ORGANIZED HEALTH CARE EDUCATION/TRAINING PROGRAM

## 2024-08-29 PROCEDURE — 37000009 HC ANESTHESIA EA ADD 15 MINS: Performed by: STUDENT IN AN ORGANIZED HEALTH CARE EDUCATION/TRAINING PROGRAM

## 2024-08-29 PROCEDURE — 25000003 PHARM REV CODE 250: Performed by: NURSE ANESTHETIST, CERTIFIED REGISTERED

## 2024-08-29 PROCEDURE — 85025 COMPLETE CBC W/AUTO DIFF WBC: CPT | Performed by: INTERNAL MEDICINE

## 2024-08-29 PROCEDURE — 25000003 PHARM REV CODE 250: Performed by: NURSE PRACTITIONER

## 2024-08-29 PROCEDURE — 63600175 PHARM REV CODE 636 W HCPCS: Performed by: NURSE PRACTITIONER

## 2024-08-29 PROCEDURE — 27201423 OPTIME MED/SURG SUP & DEVICES STERILE SUPPLY: Performed by: STUDENT IN AN ORGANIZED HEALTH CARE EDUCATION/TRAINING PROGRAM

## 2024-08-29 PROCEDURE — 36415 COLL VENOUS BLD VENIPUNCTURE: CPT | Performed by: INTERNAL MEDICINE

## 2024-08-29 PROCEDURE — A4216 STERILE WATER/SALINE, 10 ML: HCPCS | Performed by: NURSE PRACTITIONER

## 2024-08-29 PROCEDURE — 43239 EGD BIOPSY SINGLE/MULTIPLE: CPT | Performed by: STUDENT IN AN ORGANIZED HEALTH CARE EDUCATION/TRAINING PROGRAM

## 2024-08-29 PROCEDURE — 82565 ASSAY OF CREATININE: CPT | Performed by: INTERNAL MEDICINE

## 2024-08-29 PROCEDURE — 63600175 PHARM REV CODE 636 W HCPCS: Performed by: INTERNAL MEDICINE

## 2024-08-29 PROCEDURE — 25000003 PHARM REV CODE 250: Performed by: INTERNAL MEDICINE

## 2024-08-29 PROCEDURE — 37000008 HC ANESTHESIA 1ST 15 MINUTES: Performed by: STUDENT IN AN ORGANIZED HEALTH CARE EDUCATION/TRAINING PROGRAM

## 2024-08-29 PROCEDURE — 80200 ASSAY OF TOBRAMYCIN: CPT | Performed by: INTERNAL MEDICINE

## 2024-08-29 PROCEDURE — 27000207 HC ISOLATION

## 2024-08-29 RX ORDER — PROPOFOL 10 MG/ML
VIAL (ML) INTRAVENOUS
Status: COMPLETED
Start: 2024-08-29 | End: 2024-08-29

## 2024-08-29 RX ORDER — SODIUM, POTASSIUM,MAG SULFATES 17.5-3.13G
1 SOLUTION, RECONSTITUTED, ORAL ORAL 2 TIMES DAILY
Status: COMPLETED | OUTPATIENT
Start: 2024-08-29 | End: 2024-08-30

## 2024-08-29 RX ORDER — LIDOCAINE HYDROCHLORIDE 20 MG/ML
INJECTION, SOLUTION EPIDURAL; INFILTRATION; INTRACAUDAL; PERINEURAL
Status: DISCONTINUED | OUTPATIENT
Start: 2024-08-29 | End: 2024-08-29

## 2024-08-29 RX ORDER — PANTOPRAZOLE SODIUM 40 MG/10ML
40 INJECTION, POWDER, LYOPHILIZED, FOR SOLUTION INTRAVENOUS DAILY
Status: DISCONTINUED | OUTPATIENT
Start: 2024-08-29 | End: 2024-08-30

## 2024-08-29 RX ORDER — PROPOFOL 10 MG/ML
VIAL (ML) INTRAVENOUS
Status: DISCONTINUED | OUTPATIENT
Start: 2024-08-29 | End: 2024-08-29

## 2024-08-29 RX ORDER — LIDOCAINE HYDROCHLORIDE 20 MG/ML
INJECTION, SOLUTION EPIDURAL; INFILTRATION; INTRACAUDAL; PERINEURAL
Status: COMPLETED
Start: 2024-08-29 | End: 2024-08-29

## 2024-08-29 RX ADMIN — FLUOXETINE HYDROCHLORIDE 20 MG: 20 CAPSULE ORAL at 10:08

## 2024-08-29 RX ADMIN — OXYCODONE HYDROCHLORIDE AND ACETAMINOPHEN 1 TABLET: 10; 325 TABLET ORAL at 09:08

## 2024-08-29 RX ADMIN — GABAPENTIN 300 MG: 300 CAPSULE ORAL at 08:08

## 2024-08-29 RX ADMIN — LIDOCAINE HYDROCHLORIDE 5 ML: 20 INJECTION, SOLUTION INTRAVENOUS at 12:08

## 2024-08-29 RX ADMIN — OXYBUTYNIN CHLORIDE 5 MG: 5 TABLET ORAL at 08:08

## 2024-08-29 RX ADMIN — SODIUM CHLORIDE, PRESERVATIVE FREE 10 ML: 5 INJECTION INTRAVENOUS at 12:08

## 2024-08-29 RX ADMIN — MEROPENEM 1 G: 1 INJECTION, POWDER, FOR SOLUTION INTRAVENOUS at 11:08

## 2024-08-29 RX ADMIN — METHOCARBAMOL 750 MG: 750 TABLET ORAL at 08:08

## 2024-08-29 RX ADMIN — SODIUM CHLORIDE, PRESERVATIVE FREE 10 ML: 5 INJECTION INTRAVENOUS at 05:08

## 2024-08-29 RX ADMIN — PANTOPRAZOLE SODIUM 40 MG: 40 INJECTION, POWDER, LYOPHILIZED, FOR SOLUTION INTRAVENOUS at 03:08

## 2024-08-29 RX ADMIN — OXYBUTYNIN CHLORIDE 5 MG: 5 TABLET ORAL at 03:08

## 2024-08-29 RX ADMIN — MICONAZOLE NITRATE 2 % TOPICAL POWDER: at 08:08

## 2024-08-29 RX ADMIN — SODIUM CHLORIDE, SODIUM GLUCONATE, SODIUM ACETATE, POTASSIUM CHLORIDE AND MAGNESIUM CHLORIDE: 526; 502; 368; 37; 30 INJECTION, SOLUTION INTRAVENOUS at 12:08

## 2024-08-29 RX ADMIN — SODIUM SULFATE, POTASSIUM SULFATE, MAGNESIUM SULFATE 177 ML: 17.5; 3.13; 1.6 SOLUTION, CONCENTRATE ORAL at 06:08

## 2024-08-29 RX ADMIN — DOXYCYCLINE HYCLATE 100 MG: 100 TABLET, COATED ORAL at 08:08

## 2024-08-29 RX ADMIN — PROPOFOL 100 MG: 10 INJECTION, EMULSION INTRAVENOUS at 12:08

## 2024-08-29 RX ADMIN — CARVEDILOL 25 MG: 12.5 TABLET, FILM COATED ORAL at 03:08

## 2024-08-29 RX ADMIN — MEROPENEM 1 G: 1 INJECTION, POWDER, FOR SOLUTION INTRAVENOUS at 06:08

## 2024-08-29 RX ADMIN — CLONIDINE HYDROCHLORIDE 0.1 MG: 0.1 TABLET ORAL at 09:08

## 2024-08-29 RX ADMIN — TRAZODONE HYDROCHLORIDE 200 MG: 100 TABLET ORAL at 09:08

## 2024-08-29 RX ADMIN — FUROSEMIDE 20 MG: 10 INJECTION, SOLUTION INTRAMUSCULAR; INTRAVENOUS at 09:08

## 2024-08-29 RX ADMIN — ATORVASTATIN CALCIUM 20 MG: 10 TABLET, FILM COATED ORAL at 08:08

## 2024-08-29 RX ADMIN — HYDROXYZINE PAMOATE 50 MG: 50 CAPSULE ORAL at 09:08

## 2024-08-29 RX ADMIN — GABAPENTIN 300 MG: 300 CAPSULE ORAL at 03:08

## 2024-08-29 RX ADMIN — FUROSEMIDE 20 MG: 10 INJECTION, SOLUTION INTRAMUSCULAR; INTRAVENOUS at 08:08

## 2024-08-29 RX ADMIN — FLUCONAZOLE 400 MG: 2 INJECTION, SOLUTION INTRAVENOUS at 08:08

## 2024-08-29 RX ADMIN — CARVEDILOL 25 MG: 12.5 TABLET, FILM COATED ORAL at 09:08

## 2024-08-29 RX ADMIN — CLONIDINE HYDROCHLORIDE 0.1 MG: 0.1 TABLET ORAL at 08:08

## 2024-08-29 RX ADMIN — OXYCODONE HYDROCHLORIDE AND ACETAMINOPHEN 1 TABLET: 10; 325 TABLET ORAL at 08:08

## 2024-08-29 RX ADMIN — OXYCODONE HYDROCHLORIDE AND ACETAMINOPHEN 1 TABLET: 10; 325 TABLET ORAL at 03:08

## 2024-08-29 RX ADMIN — MICONAZOLE NITRATE 2 % TOPICAL POWDER: at 09:08

## 2024-08-29 NOTE — PLAN OF CARE
Problem: Adult Inpatient Plan of Care  Goal: Plan of Care Review  8/29/2024 1309 by Merlene Alonso RN  Outcome: Progressing  8/29/2024 1308 by Merlene Alonso RN  Outcome: Progressing  Goal: Patient-Specific Goal (Individualized)  8/29/2024 1309 by Merlene Alonso RN  Outcome: Progressing  8/29/2024 1308 by Merlene Alonso, RN  Outcome: Progressing  Goal: Absence of Hospital-Acquired Illness or Injury  8/29/2024 1309 by Merlene Alonso RN  Outcome: Progressing  8/29/2024 1308 by Merlene Alonso RN  Outcome: Progressing  Goal: Optimal Comfort and Wellbeing  8/29/2024 1309 by Merlene Alonso RN  Outcome: Progressing  8/29/2024 1308 by Merlene Alonso RN  Outcome: Progressing  Goal: Readiness for Transition of Care  8/29/2024 1309 by Merlene Alonso RN  Outcome: Progressing  8/29/2024 1308 by Merlene Alonso, RN  Outcome: Progressing     Problem: Wound  Goal: Optimal Coping  8/29/2024 1309 by Merlene Alonso, RN  Outcome: Progressing  8/29/2024 1308 by Merlene Alonso, RN  Outcome: Progressing  Goal: Optimal Functional Ability  8/29/2024 1309 by Merlene Alonso RN  Outcome: Progressing  8/29/2024 1308 by Merlene Alonso RN  Outcome: Progressing  Goal: Absence of Infection Signs and Symptoms  8/29/2024 1309 by Merlene Alonso, RN  Outcome: Progressing  8/29/2024 1308 by Merlene Alonso, RN  Outcome: Progressing  Goal: Improved Oral Intake  8/29/2024 1309 by Merlene Alonso, RN  Outcome: Progressing  8/29/2024 1308 by Merlene Alonso, RN  Outcome: Progressing  Goal: Optimal Pain Control and Function  8/29/2024 1309 by Merlene Alonso, RN  Outcome: Progressing  8/29/2024 1308 by Merlene Alonso, RN  Outcome: Progressing  Goal: Skin Health and Integrity  8/29/2024 1309 by Merlene Alonso, RN  Outcome: Progressing  8/29/2024 1308 by Merlene Alonso, RN  Outcome: Progressing  Goal: Optimal Wound Healing  8/29/2024 1309 by Merlene Alonso, RN  Outcome: Progressing  8/29/2024 1308 by Merlene Alonso, RN  Outcome: Progressing     Problem: Sepsis/Septic Shock  Goal: Optimal  Coping  8/29/2024 1309 by Merlene Alonso RN  Outcome: Progressing  8/29/2024 1308 by Merlene Alonso RN  Outcome: Progressing  Goal: Absence of Bleeding  8/29/2024 1309 by Merlene Alonso RN  Outcome: Progressing  8/29/2024 1308 by Merlene Alonso RN  Outcome: Progressing  Goal: Blood Glucose Level Within Targeted Range  8/29/2024 1309 by Merlene Alonso RN  Outcome: Progressing  8/29/2024 1308 by Merlene Alonso RN  Outcome: Progressing  Goal: Absence of Infection Signs and Symptoms  8/29/2024 1309 by Merlene Alonso RN  Outcome: Progressing  8/29/2024 1308 by Merlene Alonso RN  Outcome: Progressing  Goal: Optimal Nutrition Intake  8/29/2024 1309 by Merlene Alonso RN  Outcome: Progressing  8/29/2024 1308 by Merlene Alonso RN  Outcome: Progressing     Problem: Diabetes Comorbidity  Goal: Blood Glucose Level Within Targeted Range  8/29/2024 1309 by Merlene Alonso RN  Outcome: Progressing  8/29/2024 1308 by Merlene Alonso RN  Outcome: Progressing     Problem: Skin Injury Risk Increased  Goal: Skin Health and Integrity  8/29/2024 1309 by Merlene Alonso RN  Outcome: Progressing  8/29/2024 1308 by Merlene Alonso RN  Outcome: Progressing     Problem: Infection  Goal: Absence of Infection Signs and Symptoms  8/29/2024 1309 by Merlene Alonso, RN  Outcome: Progressing  8/29/2024 1308 by Merlene Alonso, RN  Outcome: Progressing     Problem: Fall Injury Risk  Goal: Absence of Fall and Fall-Related Injury  8/29/2024 1309 by Merlene Alonso, RN  Outcome: Progressing  8/29/2024 1308 by Merlene Alonso, RN  Outcome: Progressing
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
## 2024-08-29 NOTE — PT/OT/SLP PROGRESS
Physical Therapy      Patient Name:  Bianca Khan   MRN:  [id number]    Patient not seen 2/2 currently off floor for EGD. Will f/u as schedule permits.

## 2024-08-29 NOTE — NURSING
Nurses Note -- 4 Eyes      8/29/2024   5:48 PM      Skin assessed during: Q Shift Change      [] No Altered Skin Integrity Present    []Prevention Measures Documented      [x] Yes- Altered Skin Integrity Present or Discovered   [] LDA Added if Not in Epic (Describe Wound)   [] New Altered Skin Integrity was Present on Admit and Documented in LDA   [] Wound Image Taken    Wound Care Consulted? Yes    Attending Nurse:  YOLIE Blunt    Second RN/Staff Member:   YOLIE Saucedo

## 2024-08-29 NOTE — TRANSFER OF CARE
"Anesthesia Transfer of Care Note    Patient: Bianca Khan    Procedure(s) Performed: Procedure(s) (LRB):  EGD (N/A)  EGD, WITH CLOSED BIOPSY (N/A)    Patient location: PACU    Anesthesia Type: general    Transport from OR: Transported from OR on room air with adequate spontaneous ventilation    Post pain: adequate analgesia    Post assessment: no apparent anesthetic complications    Post vital signs: stable    Level of consciousness: awake, alert and oriented    Nausea/Vomiting: no nausea/vomiting    Complications: none    Transfer of care protocol was followed      Last vitals: Visit Vitals  /66   Pulse 60   Temp 36.5 °C (97.7 °F)   Resp 15   Ht 5' 10" (1.778 m)   Wt 63.5 kg (139 lb 15.9 oz)   SpO2 99%   BMI 20.09 kg/m²     "

## 2024-08-29 NOTE — ANESTHESIA PREPROCEDURE EVALUATION
"                                                                                                             08/29/2024  Bianca Khan is a 59 y.o., male.  Pre-operative evaluation for Procedure(s) (LRB):  EGD (N/A)    /73   Pulse 60   Temp 36.1 °C (97 °F)   Resp 12   Ht 5' 10" (1.778 m)   Wt 63.5 kg (139 lb 15.9 oz)   SpO2 98%   BMI 20.09 kg/m²     Past Medical History:   Diagnosis Date    Arthritis     Chronic ulcer of ankle 05/26/2022    Frequent UTI 07/02/2019    Generalized anxiety disorder 05/26/2022    Neurogenic bladder 05/26/2022    Osteomyelitis 05/26/2022    Presence of suprapubic catheter 05/26/2022    Pure hypercholesterolemia 05/26/2022    Retention of urine, unspecified 08/09/2019    Spinal cord injury at T1-T6 level 04/20/2018       Patient Active Problem List   Diagnosis    Neurogenic bladder    Anemia    Chronic pain    Chronic ulcer of ankle    Dysphagia    Fracture of distal end of tibia    Frequent UTI    Gastroesophageal reflux disease without esophagitis    Generalized anxiety disorder    Hypertension    Hip osteomyelitis, right    Presence of suprapubic catheter    Pure hypercholesterolemia    Spinal cord injury at T1-T6 level    Type 2 diabetes mellitus    Closed displaced spiral fracture of shaft of right tibia    Abscess of bursa of right knee    Septic arthritis of knee, right    Pain and swelling of knee, right    Sepsis    Sacral wound    Malnutrition of moderate degree       Review of patient's allergies indicates:   Allergen Reactions    Baclofen Itching and Anxiety       Current Outpatient Medications   Medication Instructions    amLODIPine (NORVASC) 10 mg, Oral, Daily    atorvastatin (LIPITOR) 20 mg, Oral, Nightly    carvediloL (COREG) 25 mg, Oral, 2 times daily with meals    celecoxib (CELEBREX) 200 mg, Oral, Daily    cloNIDine (CATAPRES) 0.1 mg, Oral, 3 times daily    doxycycline (VIBRA-TABS) 100 mg, Oral, Every 12 hours    famotidine (PEPCID) 20 mg, Oral, 2 times " daily    fenofibrate (TRICOR) 48 mg, Oral, Daily    ferrous gluconate 324 mg, Oral, 2 times daily with meals    FLUoxetine 20 mg, Per G Tube, Daily    folic acid (FOLVITE) 1 mg, Oral, Daily    gabapentin (NEURONTIN) 300 mg, Oral, 3 times daily    hydrALAZINE (APRESOLINE) 100 mg, Oral, 2 times daily    hydrOXYzine pamoate (VISTARIL) 50 mg, Oral, Nightly PRN    lisinopriL (PRINIVIL,ZESTRIL) 20 mg, Oral, Daily    methocarbamoL (ROBAXIN) 750 mg, Oral, 2 times daily    oxybutynin (DITROPAN) 5 mg, Oral, 2 times daily    oxyCODONE-acetaminophen (PERCOCET)  mg per tablet 1 tablet, Oral, Every 6 hours PRN    polyethylene glycol (GLYCOLAX) 17 g, Oral, 2 times daily PRN    sodium chloride 0.9 % PgBk 100 mL with meropenem 1 gram SolR 1 g 1 g, Intravenous, Every 8 hours, End date 7/12    temazepam (RESTORIL) 30 mg, Oral, Nightly    traZODone (DESYREL) 100 mg, Oral, Nightly    XARELTO 20 mg, Oral, Daily       Past Surgical History:   Procedure Laterality Date    FRACTURE SURGERY  2021    INCISION AND DRAINAGE, LOWER EXTREMITY Right 06/29/2023    Procedure: INCISION AND DRAINAGE, LOWER EXTREMITY;  Surgeon: Prabhu Shen DO;  Location: [facility];  Service: Orthopedics;  Laterality: Right;  supine bone foam wash stuff cultures    INCISION AND DRAINAGE, LOWER EXTREMITY Right 11/30/2023    Procedure: INCISION AND DRAINAGE, LOWER EXTREMITY;  Surgeon: Prabhu Shen DO;  Location: [facility] OR;  Service: Orthopedics;  Laterality: Right;  supine any table bone foam wash stuff possible wound vac    INCISION AND DRAINAGE, LOWER EXTREMITY Right 12/1/2023    Procedure: INCISION AND DRAINAGE, LOWER EXTREMITY;  Surgeon: Prabhu Shen DO;  Location: [facility] OR;  Service: Orthopedics;  Laterality: Right;  supine bone foam vascular bed removal of distal femoral plate, wash stuff, possible AKA    INSERTION OF INTRAMEDULLARY MARISA Right 08/10/2022    Procedure: INSERTION, INTRAMEDULLARY MARISA RIGHT TIBIA;  Surgeon: Jorge Orellana MD;  Location: [facility] OR;  " Service: Orthopedics;  Laterality: Right;    JOINT REPLACEMENT  2021    [place]    REMOVAL OF HARDWARE FROM LOWER EXTREMITY Right 12/1/2023    Procedure: REMOVAL, HARDWARE, LOWER EXTREMITY;  Surgeon: Prabhu Shen DO;  Location: [facility];  Service: Orthopedics;  Laterality: Right;    SPINE SURGERY  March 1st 2018         Lab Results   Component Value Date    WBC 7.32 08/29/2024    HGB 10.4 (L) 08/29/2024    HCT 33.5 (L) 08/29/2024    MCV 81.5 08/29/2024     (H) 08/29/2024          BMP  Lab Results   Component Value Date     08/28/2024    K 3.6 08/28/2024    CO2 28 08/28/2024    GLUCOSE 153 (H) 08/28/2024    BUN 17.2 08/28/2024    CREATININE 1.04 08/28/2024    CALCIUM 8.7 (L) 08/28/2024    ANIONGAP 5 (L) 05/11/2022    ESTGFRAFRICA 101 05/11/2022    EGFRNONAA >60 04/27/2022        INR  No results for input(s): "PT", "INR", "PROTIME", "APTT" in the last 72 hours.        Diagnostic Studies:      EKG:  Results for orders placed or performed during the hospital encounter of 08/20/24   EKG 12-lead    Collection Time: 08/20/24 12:47 PM   Result Value Ref Range    QRS Duration 84 ms    [facility] QTC Calculation 435 ms    Narrative    Test Reason : R00.0,    Vent. Rate : 090 BPM     Atrial Rate : 090 BPM     P-R Int : 132 ms          QRS Dur : 084 ms      QT Int : 356 ms       P-R-T Axes : 065 050 044 degrees     QTc Int : 435 ms    Normal sinus rhythm  Normal ECG  When compared with ECG of 30-MAY-2024 13:31,  No significant change was found  Confirmed by Slim Conley MD ([id number]) on 8/20/2024 3:41:31 PM    Referred By: [email or web address]   SELF           Confirmed By:Slim Conley MD       Results for orders placed during the hospital encounter of 08/20/24    Echo    Interpretation Summary    Left Ventricle: The left ventricle is normal in size. Normal wall thickness. There is normal systolic function with a visually estimated ejection fraction of 60 - 65%. Grade I diastolic dysfunction.    Right Ventricle: Normal right ventricular " cavity size. Systolic function is normal.    Aortic Valve: The aortic valve is structurally normal. Aortic valve peak velocity is 1.12 m/s. Mean gradient is 3 mmHg.    Mitral Valve: The mitral valve is structurally normal. There is mild regurgitation.    Tricuspid Valve: There is moderate regurgitation. There is pulmonary hypertension. The estimated PA systolic pressure is at least 70 mmHg.    Pulmonic Valve: There is mild regurgitation.    Pulmonary Artery: There is severe pulmonary hypertension. The estimated pulmonary artery systolic pressure is 78 mmHg.    IVC/SVC: Intermediate venous pressure at 8 mmHg.      Assessment/Plan:  Chronic Anemia  Iron deficiency noted  FOB +  EGD tomorrow  NPO after midnight  Xarelto for A fib- on hold. Last dose 8/27/24  Complicated UTI with sepsis- see Hospitalist note            Pre-op Assessment          Review of Systems      Physical Exam  General: Alert and Oriented    Airway:  Mallampati: II   Mouth Opening: Normal  TM Distance: Normal  Tongue: Normal  Neck ROM: Normal ROM    Dental:  Intact    Chest/Lungs:  Clear to auscultation, Normal Respiratory Rate    Heart:  Rate: Normal  Rhythm: Regular Rhythm        Anesthesia Plan  Type of Anesthesia, risks & benefits discussed:    Anesthesia Type: Gen Natural Airway  Intra-op Monitoring Plan: Standard ASA Monitors  Post Op Pain Control Plan: multimodal analgesia  Induction:  IV  Airway Plan: Direct  Informed Consent: Informed consent signed with the Patient and all parties understand the risks and agree with anesthesia plan.  All questions answered.   ASA Score: 3  Day of Surgery Review of History & Physical: H&P Update referred to the surgeon/provider.  Anesthesia Plan Notes: Nasal cannula vs facemask supplemental oxygenation   For patients with LADY/obesity, may consider SuperNoval Nasal CPAP      Poss conversion to General LMA/YUMIKO discussed          Ready For Surgery From Anesthesia Perspective.     .

## 2024-08-29 NOTE — NURSING
Nurses Note -- 4 Eyes      8/28/2024   7:47 PM      Skin assessed during: Q Shift Change      [] No Altered Skin Integrity Present    []Prevention Measures Documented      [x] Yes- Altered Skin Integrity Present or Discovered   [] LDA Added if Not in Epic (Describe Wound)   [] New Altered Skin Integrity was Present on Admit and Documented in LDA   [] Wound Image Taken    Wound Care Consulted? Yes    Attending Nurse:  Sheryl Bernabe RN/Staff Member:   [place]

## 2024-08-29 NOTE — PHYSICIAN QUERY
Please clarify the nutritional diagnosis associated with the clinical findings (include all that apply):  Moderate Protein Calorie Malnutrition

## 2024-08-29 NOTE — PROVATION PATIENT INSTRUCTIONS
Discharge Summary/Instructions after an Endoscopic Procedure  Patient Name: Bianca Khan  Patient MRN: [id number]  Patient YOB: 1965 Thursday, August 29, 2024  Mikal Segovia MD  Dear patient,  As a result of recent federal legislation (The Federal Cures Act), you may   receive lab or pathology results from your procedure in your [facility]   account before your physician is able to contact you. Your physician or   their representative will relay the results to you with their   recommendations at their soonest availability.  Thank you,  RESTRICTIONS:  During your procedure today, you received medications for sedation.  These   medications may affect your judgment, balance and coordination.  Therefore,   for 24 hours, you have the following restrictions:   - DO NOT drive a car, operate machinery, make legal/financial decisions,   sign important papers or drink alcohol.    ACTIVITY:  Today: no heavy lifting, straining or running due to procedural   sedation/anesthesia.  The following day: return to full activity including work.  DIET:  Eat and drink normally unless instructed otherwise.     TREATMENT FOR COMMON SIDE EFFECTS:  - Mild abdominal pain, nausea, belching, bloating or excessive gas:  rest,   eat lightly and use a heating pad.  - Sore Throat: treat with throat lozenges and/or gargle with warm salt   water.  - Because air was used during the procedure, expelling large amounts of air   from your rectum or belching is normal.  - If a bowel prep was taken, you may not have a bowel movement for 1-3 days.    This is normal.  SYMPTOMS TO WATCH FOR AND REPORT TO YOUR PHYSICIAN:  1. Abdominal pain or bloating, other than gas cramps.  2. Chest pain.  3. Back pain.  4. Signs of infection such as: chills or fever occurring within 24 hours   after the procedure.  5. Rectal bleeding, which would show as bright red, maroon, or black stools.   (A tablespoon of blood from the rectum is not serious, especially  if   hemorrhoids are present.)  6. Vomiting.  7. Weakness or dizziness.  GO DIRECTLY TO THE NEAREST EMERGENCY ROOM IF YOU HAVE ANY OF THE FOLLOWING:      Difficulty breathing              Chills and/or fever over 101 F   Persistent vomiting and/or vomiting blood   Severe abdominal pain   Severe chest pain   Black, tarry stools   Bleeding- more than one tablespoon   Any other symptom or condition that you feel may need urgent attention  Your doctor recommends these additional instructions:  If any biopsies were taken, your doctors clinic will contact you in 1 to 2   weeks with any results.  - Return patient to hospital ortiz for ongoing care.   - Clear liquid diet.   - Continue present medications.   - Await pathology results.   - Perform a colonoscopy tomorrow.   - Use Protonix (pantoprazole) 40 mg PO BID indefinitely.  For questions, problems or results please call your physician - Mikal Segovia MD at Work:  [phone].  [facility] ED at [phone]  IF A COMPLICATION OR EMERGENCY SITUATION ARISES AND YOU ARE UNABLE TO REACH   YOUR PHYSICIAN - GO DIRECTLY TO THE EMERGENCY ROOM.  MD Mikal Telles MD  8/29/2024 1:26:24 PM  This report has been verified and signed electronically.  Dear patient,  As a result of recent federal legislation (The Federal Cures Act), you may   receive lab or pathology results from your procedure in your [facility]   account before your physician is able to contact you. Your physician or   their representative will relay the results to you with their   recommendations at their soonest availability.  Thank you,  PROVATION

## 2024-08-29 NOTE — NURSING
[facility] - 5th Floor Med Surg  Wound Care    Patient Name:  Bianca Khan   MRN:  [id number]  Date: 2024  Diagnosis: <principal problem not specified>    History:     Past Medical History:   Diagnosis Date    Arthritis     Chronic ulcer of ankle 2022    Frequent UTI 2019    Generalized anxiety disorder 2022    Neurogenic bladder 2022    Osteomyelitis 2022    Presence of suprapubic catheter 2022    Pure hypercholesterolemia 2022    Retention of urine, unspecified 2019    Spinal cord injury at T1-T6 level 2018       Social History     Socioeconomic History    Marital status:    Tobacco Use    Smoking status: Some Days     Current packs/day: 0.00     Average packs/day: 0.2 packs/day for 41.5 years (10.4 ttl pk-yrs)     Types: Cigars, Cigarettes     Start date: 1982     Last attempt to quit: 2023     Years since quittin.0    Smokeless tobacco: Never   Substance and Sexual Activity    Alcohol use: Not Currently     Alcohol/week: 2.0 standard drinks of alcohol     Types: 2 Cans of beer per week    Drug use: Not Currently     Types: Oxycodone    Sexual activity: Not Currently     Partners: Female     Birth control/protection: None     Social Determinants of Health     Financial Resource Strain: Low Risk  (2023)    Overall Financial Resource Strain (CARDIA)     Difficulty of Paying Living Expenses: Not very hard   Food Insecurity: No Food Insecurity (2023)    Hunger Vital Sign     Worried About Running Out of Food in the Last Year: Never true     Ran Out of Food in the Last Year: Never true   Transportation Needs: No Transportation Needs (2023)    PRAPARE - Transportation     Lack of Transportation (Medical): No     Lack of Transportation (Non-Medical): No   Physical Activity: Inactive (2023)    Exercise Vital Sign     Days of Exercise per Week: 0 days     Minutes of Exercise per Session: 0 min   Stress:  No Stress Concern Present (12/11/2023)    [employer] [place] [place] [employer] - Occupational Stress Questionnaire     Feeling of Stress : Only a little   Housing Stability: Low Risk  (12/11/2023)    Housing Stability Vital Sign     Unable to Pay for Housing in the Last Year: No     Number of Places Lived in the Last Year: 1     Unstable Housing in the Last Year: No       Precautions:     Allergies as of 08/20/2024 - Reviewed 08/20/2024   Allergen Reaction Noted    Baclofen Itching and Anxiety 06/17/2019       WO Assessment Details/Treatment      08/29/24 0900   Pressure Injury Prevention    Heel protection technique Heel boot   Heel preventative measures Apply        Negative Pressure Wound Therapy  05/31/24   Placement Date: 05/31/24   Location: Sacral Spine   NPWT Type Vacuum Therapy   Therapy Setting NPWT Continuous therapy   Pressure Setting NPWT 125 mmHg   Sponges Inserted NPWT Black;1  (versatel dressing)   Sponges Removed NPWT Black;1  (versatel dressing)        Altered Skin Integrity 01/09/24 0848 upper Sacral spine   Date First Assessed/Time First Assessed: 01/09/24 0848   Altered Skin Integrity Present on Admission - Did Patient arrive to the hospital with altered skin?: suspected hospital acquired  Orientation: upper  Location: Sacral spine   Wound Image    Description of Altered Skin Integrity Full thickness tissue loss. Subcutaneous fat may be visible but bone, tendon or muscle are not exposed   Dressing Appearance Intact;Moist drainage   Drainage Amount Small   Drainage Characteristics/Odor Serosanguineous;No odor   Appearance Red;Moist;Granulating   Tissue loss description Full thickness   Red (%), Wound Tissue Color 100 %   Periwound Area Dry;Intact   Wound Edges Irregular   Wound Length (cm) 6 cm   Wound Width (cm) 2 cm   Wound Depth (cm) 1.6 cm   Wound Volume (cm^3) 19.2 cm^3   Wound Surface Area (cm^2) 12 cm^2   Care Cleansed with:;Antimicrobial agent   Dressing Changed        Altered Skin  Integrity 06/28/23 2230 Right lateral Ankle #6   Date First Assessed/Time First Assessed: 06/28/23 2230   Altered Skin Integrity Present on Admission - Did Patient arrive to the hospital with altered skin?: yes  Side: Right  Orientation: lateral  Location: Ankle  Wound Number: #6  Is this injury dev...   Wound Image    Description of Altered Skin Integrity Partial thickness tissue loss. Shallow open ulcer with a red or pink wound bed, without slough. Intact or Open/Ruptured Serum-filled blister.   Dressing Appearance Intact;Moist drainage   Drainage Amount Scant   Drainage Characteristics/Odor Serosanguineous;No odor   Appearance Pink;Red;Moist   Tissue loss description Partial thickness   Red (%), Wound Tissue Color 100 %   Periwound Area Dry;Intact   Wound Edges Irregular   Wound Length (cm) 1.5 cm   Wound Width (cm) 1 cm   Wound Surface Area (cm^2) 1.5 cm^2   Care Cleansed with:;Antimicrobial agent   Dressing Calcium alginate;Rolled gauze        Wound 05/30/24 0000 Pressure Injury Right Buttocks   Date First Assessed/Time First Assessed: 05/30/24 0000   Primary Wound Type: Pressure Injury  Side: Right  Location: Buttocks  Is this injury device related?: No   Wound Image    Pressure Injury Stage 3   Dressing Appearance Intact;Moist drainage   Drainage Amount Small   Drainage Characteristics/Odor Serous;No odor   Tissue loss description Full thickness   Red (%), Wound Tissue Color 80 %   Yellow (%), Wound Tissue Color 20 %   Periwound Area Dry;Intact   Wound Edges Irregular   Wound Length (cm) 5.4 cm   Wound Width (cm) 4.5 cm   Wound Depth (cm) 1 cm   Wound Volume (cm^3) 24.3 cm^3   Wound Surface Area (cm^2) 24.3 cm^2   Care Cleansed with:;Antimicrobial agent   Dressing Changed   Safety Management   Safety Promotion/Fall Prevention assistive device/personal item within reach;Fall Risk signage in place;nonskid shoes/socks when out of bed;side rails raised x 3   Patient Rounds bed in low position;bed wheels  locked;call light in patient/parent reach;clutter free environment maintained;ID band on;placement of personal items at bedside;visualized patient   Daily Care   Activity Management Rolling - L1   Activity Assistance Provided assistance, 1 person   Elimination Assistance incontinence pad changed   Positioning   Body Position supine  (wound care was in there)   Head of Bed (HOB) Positioning HOB at 20-30 degrees   Positioning/Transfer Devices pillows   Hygiene Care   Hygiene Assistance per care provider x 1   Change out of dressings to both buttock ulcers and left lateral ankle.   Added santyl top oiint to right ishium wd for yellowed slough with vac changes.    He tolerated well  Left lateral ankle wd pink again.  Redressed and continued offloading .   Air fluidized bed with q2hr rotation with wedge continues.  Will continue to follow.   [facility] [place] [facility] [place].       08/29/2024

## 2024-08-29 NOTE — ANESTHESIA POSTPROCEDURE EVALUATION
Anesthesia Post Evaluation    Patient: Bianca Khan    Procedure(s) Performed: Procedure(s) (LRB):  EGD (N/A)  EGD, WITH CLOSED BIOPSY (N/A)    Final Anesthesia Type: general (-TIVA Natalie AW)      Patient location during evaluation: GI PACU  Patient participation: Yes- Able to Participate  Level of consciousness: awake and alert, awake and oriented  Post-procedure vital signs: reviewed and stable  Pain management: adequate  Airway patency: patent    PONV status at discharge: No PONV  Anesthetic complications: no      Cardiovascular status: blood pressure returned to baseline, hemodynamically stable and stable  Respiratory status: unassisted, spontaneous ventilation and room air  Hydration status: euvolemic  Follow-up not needed.              Vitals Value Taken Time   /83 08/29/24 1336   Temp 36.5 °C (97.7 °F) 08/29/24 1313   Pulse 60 08/29/24 1338   Resp 12 08/29/24 1338   SpO2 98 % 08/29/24 1338   Vitals shown include unfiled device data.      No case tracking events are documented in the log.      Pain/Adriana Score: Pain Rating Prior to Med Admin: 9 (8/29/2024  9:55 AM)  Pain Rating Post Med Admin: 0 (8/29/2024 10:55 AM)  Adriana Score: 10 (8/29/2024  1:32 PM)

## 2024-08-29 NOTE — PHYSICIAN QUERY
Please clarify if there is any clinical correlation between UTI and suprapubic catheter.  Due to or associated with each other

## 2024-08-29 NOTE — NURSING
Nurses Note -- 4 Eyes      8/29/2024   1:10 PM      Skin assessed during: Q Shift Change      [] No Altered Skin Integrity Present    []Prevention Measures Documented      [x] Yes- Altered Skin Integrity Present or Discovered   [] LDA Added if Not in Epic (Describe Wound)   [] New Altered Skin Integrity was Present on Admit and Documented in LDA   [] Wound Image Taken    Wound Care Consulted? Yes    Attending Nurse:  Merlene Bernabe RN/Staff Member:   YOLIE Saucedo
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
## 2024-08-30 VITALS
HEART RATE: 65 BPM | OXYGEN SATURATION: 98 % | DIASTOLIC BLOOD PRESSURE: 65 MMHG | RESPIRATION RATE: 18 BRPM | TEMPERATURE: 98 F | BODY MASS INDEX: 20.04 KG/M2 | WEIGHT: 140 LBS | SYSTOLIC BLOOD PRESSURE: 114 MMHG | HEIGHT: 70 IN

## 2024-08-30 PROBLEM — A49.9 ESBL (EXTENDED SPECTRUM BETA-LACTAMASE) PRODUCING BACTERIA INFECTION: Status: ACTIVE | Noted: 2024-08-30

## 2024-08-30 PROBLEM — Z16.12 ESBL (EXTENDED SPECTRUM BETA-LACTAMASE) PRODUCING BACTERIA INFECTION: Status: ACTIVE | Noted: 2024-08-30

## 2024-08-30 LAB
HCT VFR BLD AUTO: 35.6 % (ref 42–52)
HGB BLD-MCNC: 11 G/DL (ref 14–18)
PSYCHE PATHOLOGY RESULT: NORMAL
TOBRAMYCIN TROUGH SERPL-MCNC: 0.5 UG/ML (ref 0.3–2)

## 2024-08-30 PROCEDURE — 63600175 PHARM REV CODE 636 W HCPCS: Performed by: NURSE PRACTITIONER

## 2024-08-30 PROCEDURE — 80200 ASSAY OF TOBRAMYCIN: CPT | Performed by: STUDENT IN AN ORGANIZED HEALTH CARE EDUCATION/TRAINING PROGRAM

## 2024-08-30 PROCEDURE — 25000003 PHARM REV CODE 250: Performed by: NURSE PRACTITIONER

## 2024-08-30 PROCEDURE — 85018 HEMOGLOBIN: CPT | Performed by: NURSE PRACTITIONER

## 2024-08-30 PROCEDURE — 25000003 PHARM REV CODE 250: Performed by: INTERNAL MEDICINE

## 2024-08-30 PROCEDURE — 25000003 PHARM REV CODE 250: Performed by: STUDENT IN AN ORGANIZED HEALTH CARE EDUCATION/TRAINING PROGRAM

## 2024-08-30 PROCEDURE — 63600175 PHARM REV CODE 636 W HCPCS: Performed by: INTERNAL MEDICINE

## 2024-08-30 PROCEDURE — 63600175 PHARM REV CODE 636 W HCPCS: Performed by: STUDENT IN AN ORGANIZED HEALTH CARE EDUCATION/TRAINING PROGRAM

## 2024-08-30 PROCEDURE — 36415 COLL VENOUS BLD VENIPUNCTURE: CPT | Performed by: STUDENT IN AN ORGANIZED HEALTH CARE EDUCATION/TRAINING PROGRAM

## 2024-08-30 PROCEDURE — A4216 STERILE WATER/SALINE, 10 ML: HCPCS | Performed by: NURSE PRACTITIONER

## 2024-08-30 RX ORDER — PANTOPRAZOLE SODIUM 40 MG/10ML
40 INJECTION, POWDER, LYOPHILIZED, FOR SOLUTION INTRAVENOUS 2 TIMES DAILY
Status: DISCONTINUED | OUTPATIENT
Start: 2024-08-30 | End: 2024-08-30 | Stop reason: HOSPADM

## 2024-08-30 RX ORDER — TRAZODONE HYDROCHLORIDE 100 MG/1
200 TABLET ORAL NIGHTLY PRN
Start: 2024-08-30 | End: 2025-08-30

## 2024-08-30 RX ORDER — MICONAZOLE NITRATE 2 G/100G
POWDER TOPICAL 2 TIMES DAILY
Start: 2024-08-30

## 2024-08-30 RX ORDER — PANTOPRAZOLE SODIUM 40 MG/10ML
40 INJECTION, POWDER, LYOPHILIZED, FOR SOLUTION INTRAVENOUS 2 TIMES DAILY
Start: 2024-08-30

## 2024-08-30 RX ORDER — FLUCONAZOLE 2 MG/ML
400 INJECTION, SOLUTION INTRAVENOUS DAILY
Start: 2024-08-30 | End: 2024-09-07

## 2024-08-30 RX ADMIN — CLONIDINE HYDROCHLORIDE 0.1 MG: 0.1 TABLET ORAL at 10:08

## 2024-08-30 RX ADMIN — MICONAZOLE NITRATE 2 % TOPICAL POWDER: at 10:08

## 2024-08-30 RX ADMIN — FLUOXETINE HYDROCHLORIDE 20 MG: 20 CAPSULE ORAL at 10:08

## 2024-08-30 RX ADMIN — SODIUM CHLORIDE, PRESERVATIVE FREE 10 ML: 5 INJECTION INTRAVENOUS at 12:08

## 2024-08-30 RX ADMIN — METHOCARBAMOL 750 MG: 750 TABLET ORAL at 10:08

## 2024-08-30 RX ADMIN — OXYBUTYNIN CHLORIDE 5 MG: 5 TABLET ORAL at 10:08

## 2024-08-30 RX ADMIN — AMLODIPINE BESYLATE 10 MG: 5 TABLET ORAL at 10:08

## 2024-08-30 RX ADMIN — DOXYCYCLINE HYCLATE 100 MG: 100 TABLET, COATED ORAL at 10:08

## 2024-08-30 RX ADMIN — FAMOTIDINE 20 MG: 20 TABLET, FILM COATED ORAL at 10:08

## 2024-08-30 RX ADMIN — SODIUM SULFATE, POTASSIUM SULFATE, MAGNESIUM SULFATE 177 ML: 17.5; 3.13; 1.6 SOLUTION, CONCENTRATE ORAL at 04:08

## 2024-08-30 RX ADMIN — GABAPENTIN 300 MG: 300 CAPSULE ORAL at 10:08

## 2024-08-30 RX ADMIN — PANTOPRAZOLE SODIUM 40 MG: 40 INJECTION, POWDER, LYOPHILIZED, FOR SOLUTION INTRAVENOUS at 10:08

## 2024-08-30 RX ADMIN — GABAPENTIN 300 MG: 300 CAPSULE ORAL at 01:08

## 2024-08-30 RX ADMIN — TOBRAMYCIN SULFATE 320 MG: 40 INJECTION, SOLUTION INTRAMUSCULAR; INTRAVENOUS at 10:08

## 2024-08-30 RX ADMIN — OXYCODONE HYDROCHLORIDE AND ACETAMINOPHEN 1 TABLET: 10; 325 TABLET ORAL at 01:08

## 2024-08-30 RX ADMIN — FUROSEMIDE 20 MG: 10 INJECTION, SOLUTION INTRAMUSCULAR; INTRAVENOUS at 10:08

## 2024-08-30 RX ADMIN — CARVEDILOL 25 MG: 12.5 TABLET, FILM COATED ORAL at 10:08

## 2024-08-30 RX ADMIN — OXYBUTYNIN CHLORIDE 5 MG: 5 TABLET ORAL at 01:08

## 2024-08-30 RX ADMIN — MEROPENEM 1 G: 1 INJECTION, POWDER, FOR SOLUTION INTRAVENOUS at 05:08

## 2024-08-30 NOTE — PROGRESS NOTES
"Pharmacokinetic Follow Up: Tobramycin    Assessment of levels:     Random concentration of 0.5 mcg/mL (36 hours post-infusion) corresponds to a dosing interval of every 36 hours.  Regimen Plan:   Will redose Tobramycin 320 mg IV once and recheck a trough level on 08/31 at 2000( 34 hours post dose) and re-dose if tobramycin level < 1 mg/L.    Drug levels (last 3 results):  No results for input(s): "AMIKACINPEAK", "AMIKACINTROU", "AMIKACINRAND", "AMIKACIN" in the last 72 hours.    No results for input(s): "GENTAMICIN" in the last 72 hours.    Invalid input(s): "GENTPEAK", "GENTTROUGH", "GENT10", "GENT12", "GENT8", "GENTRANDOM"    Recent Labs   Lab Result Units 08/28/24  1621 08/29/24 2012 08/30/24  0849   Tobramycin Trough ug/ml 1.4 1.7 0.5       Pharmacy will continue to monitor.    Please contact pharmacy at extension [phone] with any questions regarding this assessment.    Thank you for the consult,   Aleks Burgess      Patient brief summary:  Bianca Khan is a 59 y.o. male initiated on aminoglycoside therapy for treatment of skin & soft tissue infection    Drug Allergies:   Review of patient's allergies indicates:   Allergen Reactions    Baclofen Itching and Anxiety       Actual Body Weight:   63.5kg    Adjust Body Weight:   63.5kg    Ideal Body Weight:  73kg    Renal Function:   Estimated Creatinine Clearance: 54.7 mL/min (A) (based on SCr of 1.29 mg/dL (H)).,     Dialysis Method (if applicable):  N/A    CBC (last 72 hours):  Recent Labs   Lab Result Units 08/28/24  0350 08/29/24  0400 08/30/24  0849   WBC x10(3)/mcL 6.69 7.32  --    Hgb g/dL 10.3* 10.4* 11.0*   Hct % 32.9* 33.5* 35.6*   Platelet x10(3)/mcL 602* 648*  --    Mono % % 11.7 10.2  --    Eos % % 3.4 3.1  --    Basophil % % 0.4 0.3  --        Metabolic Panel (last 72 hours):  Recent Labs   Lab Result Units 08/28/24  0350 08/29/24 2012   Sodium mmol/L 139  --    Potassium mmol/L 3.6  --    Chloride mmol/L 100  --    CO2 mmol/L 28  --    Glucose mg/dL " 153*  --    Blood Urea Nitrogen mg/dL 17.2  --    Creatinine mg/dL 1.04 1.29*       Aminoglycoside Administrations:  aminoglycosides given in last 96 hours                     tobramycin (NEBCIN) 320 mg in D5W 100 mL IVPB (mg) 320 mg New Bag 08/28/24 2147    tobramycin (NEBCIN) 445 mg in D5W 100 mL IVPB (mg) 445 mg New Bag 08/27/24 1752     445 mg New Bag 08/26/24 1805                    Microbiologic Results:  Microbiology Results (last 7 days)       Procedure Component Value Units Date/Time    Blood Culture [[id number]]  (Normal) Collected: 08/21/24 1644    Order Status: Completed Specimen: Blood, Venous Updated: 08/26/24 1900     Blood Culture No Growth at 5 days    Blood Culture [[id number]]  (Normal) Collected: 08/21/24 1644    Order Status: Completed Specimen: Blood, Venous Updated: 08/26/24 1900     Blood Culture No Growth at 5 days    Blood Culture #1 **CANNOT BE ORDERED STAT** [[id number]]  (Normal) Collected: 08/20/24 1232    Order Status: Completed Specimen: Blood Updated: 08/25/24 1300     Blood Culture No Growth at 5 days    Wound Culture [[id number]]  (Abnormal)  (Susceptibility) Collected: 08/20/24 1527    Order Status: Completed Specimen: Abscess from Sacral Updated: 08/24/24 1134     Wound Culture Few Escherichia coli ESBL      Few Pseudomonas aeruginosa     Comment: CRPA (Carbapenem-resistant Pseudomonas areuginosa)         Few Trichosporon asahii    Blood Culture #2 **CANNOT BE ORDERED STAT** [[id number]]  (Abnormal)  (Susceptibility) Collected: 08/20/24 1540    Order Status: Completed Specimen: Blood Updated: 08/23/24 1237     Blood Culture Escherichia coli ESBL     GRAM STAIN Gram Negative Rods      Seen in gram stain of broth only      2 of 2 bottles positive

## 2024-08-30 NOTE — PT/OT/SLP PROGRESS
Physical Therapy      Patient Name:  Bianca Khan   MRN:  [id number]    Patient not seen today secondary to pt refusal, pt with pending d/c to [facility] 8/30 . Will follow-up as appropriate.

## 2024-08-30 NOTE — PROGRESS NOTES
Infectious Diseases Progress Note  60 y/o male with past medical history of T-spine cord injury with paraplegia, diabetes, HTN, hepatitis-C, chronic MRSA L ankle wound/osteomyelitis, was on suppressive doxycycline and R knee infection/septic arthritis and osteomyelitis with GBS/Enterococcus and Ecoli isolates who presented to ER on 8/20 with fevers, cough and body aches. On admit he was noted to be febrile with leukocytosis, WBC 17.25. Blood cultures revealed ESLB Ecoli with Ecoli and Candida Albicans detected on BCID. U/A with 1+ nitrites, 500 LE, 50-99 RBC, >100 WBC, occasional bacteria, budding yeast. Urine culture >100k ESBL Ecoli. Wound culture revealing ESBL Ecoli, CR Pseudomonas and Trichosporon isolates.  and .6. MRSA/MSSA PCR (-). CXR unremarkable.   He is currently on Merrem, Tobramycin and Diflucan    Subjective:  Lying in bed, no acute distress. No new complaints voiced. Afebrile. Wife sleeping at bedside.     ROS  Constitutional:  Positive for malaise/fatigue.   HENT: Negative.     Eyes: Negative.    Respiratory: Negative.     Cardiovascular: Negative.    Gastrointestinal: Negative.    Musculoskeletal: Negative.    Skin:         Multiple wounds   Neurological:  Positive for focal weakness and weakness.   All other systems reviewed and are negative.    Review of patient's allergies indicates:   Allergen Reactions    Baclofen Itching and Anxiety       Past Medical History:   Diagnosis Date    Arthritis     Chronic ulcer of ankle 05/26/2022    Frequent UTI 07/02/2019    Generalized anxiety disorder 05/26/2022    Neurogenic bladder 05/26/2022    Osteomyelitis 05/26/2022    Paraplegia     Presence of suprapubic catheter 05/26/2022    Pure hypercholesterolemia 05/26/2022    Retention of urine, unspecified 08/09/2019    Spinal cord injury at T1-T6 level 04/20/2018       Past Surgical History:   Procedure Laterality Date    EGD, WITH CLOSED BIOPSY N/A 8/29/2024    Procedure: EGD, WITH CLOSED  BIOPSY;  Surgeon: Mikal Segovia MD;  Location: [facility] ENDOSCOPY;  Service: Gastroenterology;  Laterality: N/A;    ESOPHAGOGASTRODUODENOSCOPY N/A 2024    Procedure: EGD;  Surgeon: Mikal Segovia MD;  Location: [facility] ENDOSCOPY;  Service: Gastroenterology;  Laterality: N/A;    FRACTURE SURGERY      INCISION AND DRAINAGE, LOWER EXTREMITY Right 2023    Procedure: INCISION AND DRAINAGE, LOWER EXTREMITY;  Surgeon: Prabhu Shen DO;  Location: [facility];  Service: Orthopedics;  Laterality: Right;  supine bone foam wash stuff cultures    INCISION AND DRAINAGE, LOWER EXTREMITY Right 2023    Procedure: INCISION AND DRAINAGE, LOWER EXTREMITY;  Surgeon: Prabhu Shen DO;  Location: [facility];  Service: Orthopedics;  Laterality: Right;  supine any table bone foam wash stuff possible wound vac    INCISION AND DRAINAGE, LOWER EXTREMITY Right 2023    Procedure: INCISION AND DRAINAGE, LOWER EXTREMITY;  Surgeon: Prabhu Shen DO;  Location: [facility];  Service: Orthopedics;  Laterality: Right;  supine bone foam vascular bed removal of distal femoral plate, wash stuff, possible AKA    INSERTION OF INTRAMEDULLARY MARISA Right 08/10/2022    Procedure: INSERTION, INTRAMEDULLARY MARISA RIGHT TIBIA;  Surgeon: Jorge Orellana MD;  Location: [facility];  Service: Orthopedics;  Laterality: Right;    JOINT REPLACEMENT      Ankel    REMOVAL OF HARDWARE FROM LOWER EXTREMITY Right 2023    Procedure: REMOVAL, HARDWARE, LOWER EXTREMITY;  Surgeon: Prabhu Shen DO;  Location: [facility];  Service: Orthopedics;  Laterality: Right;    SPINE SURGERY  2018       Social History     Socioeconomic History    Marital status:    Tobacco Use    Smoking status: Some Days     Current packs/day: 0.00     Average packs/day: 0.2 packs/day for 41.5 years (10.4 ttl pk-yrs)     Types: Cigars, Cigarettes     Start date: 1982     Last attempt to quit: 2023     Years since quittin.0    Smokeless tobacco:  Never   Substance and Sexual Activity    Alcohol use: Not Currently     Alcohol/week: 2.0 standard drinks of alcohol     Types: 2 Cans of beer per week    Drug use: Not Currently     Types: Oxycodone    Sexual activity: Not Currently     Partners: Female     Birth control/protection: None     Social Determinants of Health     Financial Resource Strain: Low Risk  (12/11/2023)    Overall Financial Resource Strain (CARDIA)     Difficulty of Paying Living Expenses: Not very hard   Food Insecurity: No Food Insecurity (12/11/2023)    Hunger Vital Sign     Worried About Running Out of Food in the Last Year: Never true     Ran Out of Food in the Last Year: Never true   Transportation Needs: No Transportation Needs (12/11/2023)    PRAPARE - Transportation     Lack of Transportation (Medical): No     Lack of Transportation (Non-Medical): No   Physical Activity: Inactive (12/11/2023)    Exercise Vital Sign     Days of Exercise per Week: 0 days     Minutes of Exercise per Session: 0 min   Stress: No Stress Concern Present (12/11/2023)    [employer] - Occupational Stress Questionnaire     Feeling of Stress : Only a little   Housing Stability: Low Risk  (12/11/2023)    Housing Stability Vital Sign     Unable to Pay for Housing in the Last Year: No     Number of Places Lived in the Last Year: 1     Unstable Housing in the Last Year: No         Scheduled Meds:   amLODIPine  10 mg Oral Daily    atorvastatin  20 mg Oral Nightly    carvediloL  25 mg Oral BID WM    cloNIDine  0.1 mg Oral TID    collagenase   Topical (Top) Twice Weekly    doxycycline  100 mg Oral Q12H    famotidine  20 mg Oral Daily    fluconazole (DIFLUCAN) IV (PEDS and ADULTS)  400 mg Intravenous Q24H    FLUoxetine  20 mg Oral Daily    furosemide (LASIX) injection  20 mg Intravenous Q12H    gabapentin  300 mg Oral TID    meropenem IV (PEDS and ADULTS)  1 g Intravenous Q8H    methocarbamoL  750 mg Oral BID    miconazole NITRATE 2 %    "Topical (Top) BID    oxybutynin  5 mg Oral TID    pantoprazole  40 mg Intravenous Daily    sodium chloride 0.9%  10 mL Intravenous Q6H     Continuous Infusions:  PRN Meds:  Current Facility-Administered Medications:     0.9%  NaCl infusion (for blood administration), , Intravenous, Q24H PRN    acetaminophen, 650 mg, Oral, Q8H PRN    acetaminophen, 650 mg, Oral, Q4H PRN    dextromethorphan-guaiFENesin  mg/5 ml, 5 mL, Oral, Q4H PRN    dextrose 10%, 12.5 g, Intravenous, PRN    dextrose 10%, 25 g, Intravenous, PRN    glucagon (human recombinant), 1 mg, Intramuscular, PRN    glucose, 16 g, Oral, PRN    glucose, 24 g, Oral, PRN    hydrALAZINE, 10 mg, Intravenous, Q4H PRN    hydrOXYzine pamoate, 50 mg, Oral, Nightly PRN    ondansetron, 4 mg, Intravenous, Q8H PRN    oxyCODONE-acetaminophen, 1 tablet, Oral, Q4H PRN    Flushing PICC/Midline Protocol, , , Until Discontinued **AND** sodium chloride 0.9%, 10 mL, Intravenous, Q6H **AND** sodium chloride 0.9%, 10 mL, Intravenous, PRN    tobramycin - pharmacy to dose, , Intravenous, pharmacy to manage frequency    traZODone, 200 mg, Oral, Nightly PRN    Objective:  /84   Pulse 61   Temp 97.5 °F (36.4 °C) (Oral)   Resp 18   Ht 5' 10" (1.778 m)   Wt 63.5 kg (139 lb 15.9 oz)   SpO2 97%   BMI 20.09 kg/m²     Physical Exam:   Physical Exam  Vitals reviewed.   Constitutional:       Appearance: He is ill-appearing.   HENT:      Head: Normocephalic.   Cardiovascular:      Rate and Rhythm: Normal rate and regular rhythm.   Pulmonary:      Effort: Pulmonary effort is normal. No respiratory distress.      Breath sounds: Normal breath sounds. No wheezing.   Abdominal:      General: Bowel sounds are normal. There is no distension.      Palpations: Abdomen is soft.      Tenderness: There is no abdominal tenderness.   Musculoskeletal:      Cervical back: Normal range of motion.      Comments: Paraplegia   Skin:     Findings: No rash.      Comments: Wounds dressed, wound vac in " place   Neurological:      Mental Status: He is alert and oriented to person, place, and time.   Psychiatric:         Mood and Affect: Mood normal.         Behavior: Behavior normal.     Imaging    Lab Review   Recent Results (from the past 24 hour(s))   TOBRAMYCIN, TROUGH    Collection Time: 08/29/24  8:12 PM   Result Value Ref Range    Tobramycin Trough 1.7 0.3 - 2.0 ug/ml   Creatinine, Serum    Collection Time: 08/29/24  8:12 PM   Result Value Ref Range    Creatinine 1.29 (H) 0.73 - 1.18 mg/dL    eGFR >60 mL/min/1.73/m2       Assessment/Plan:  ESBL Ecoli Sepsis  Candidemia  ESBL Ecoli complicated UTI  ANTON  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  Paraplegia  DM Type II  Anemia  Reactive thrombocytosis  Sacral / L hip wound infection / abscess - GNR isolates  B/L pleural effusions    -Continue Merrem #8, Tobramycin #7 and Diflucan #10.   -Plan a 14 day course with end date 9/7  -Monitor renal functions closely  -Afebrile without leukocytosis  -Seen by GI for management of anemia with positive FBOT. S/P EGD with biopsy on 8/29. Plan for colonoscopy as an outpatient  -U/A abnormal with urine culture >100k ESBL Ecoli  -Urology on board, inputs noted. Had some leaking from around suprapubic catheter. Balloon deflated to 10 mL and Ditropan increased  -Blood cultures with ESBL Ecoli 2/2 sets with Ecoli and Candida Albicans detected on BCID  -Repeat blood cultures negative  -Sacral wound cultures ESBL Ecoli, CR Pseudomonas and Trichosporon  -Continue wound care  -CXR and CTA Chest with B/L pleural effusions. Started on Lasix per primary  -Remains on chronic suppressive antibiotic therapy with Doxycycline for MRSA L ankle osteomyelitis with retained hardware  -Discussed with patient and nursing.

## 2024-08-30 NOTE — NURSING
Nurses Note -- 4 Eyes      8/30/2024   0730 AM      Skin assessed during: Q Shift Change      [] No Altered Skin Integrity Present    []Prevention Measures Documented      [x] Yes- Altered Skin Integrity Present or Discovered   [] LDA Added if Not in Epic (Describe Wound)   [] New Altered Skin Integrity was Present on Admit and Documented in LDA   [] Wound Image Taken    Wound Care Consulted? Yes    Attending Nurse:  YOLIE Nixon    Second RN/Staff Member:   YOLIE Saucedo

## 2024-08-30 NOTE — PROGRESS NOTES
Pharmacokinetic Follow Up: Tobramycin    Assessment of levels:     Level of 1.7 mcg/ml is above goal of 1 mcg/ml.  Serum creatinine increasing.    Regimen Plan:     Discontinue tobramycin for now and recheck level on 8/30/24.  Redose when level is less than 1 mcg/ml.  Will also check serum creatinine with tobramycin level.      Recent Labs   Lab Result Units 08/28/24  1621 08/29/24 2012   Tobramycin Trough ug/ml 1.4 1.7       Patient brief summary:  Bianca Khan is a 59 y.o. male initiated on aminoglycoside therapy for treatment of skin & soft tissue infection    Drug Allergies:   Review of patient's allergies indicates:   Allergen Reactions    Baclofen Itching and Anxiety         Renal Function:   Estimated Creatinine Clearance: 54.7 mL/min (A) (based on SCr of 1.29 mg/dL (H)).,     Dialysis Method (if applicable):  N/A    CBC (last 72 hours):  Recent Labs   Lab Result Units 08/27/24  0408 08/28/24  0350 08/29/24  0400   WBC x10(3)/mcL 7.46 6.69 7.32   Hgb g/dL 10.8* 10.3* 10.4*   Hct % 33.9* 32.9* 33.5*   Platelet x10(3)/mcL 609* 602* 648*   Mono % % 8.8 11.7 10.2   Eos % % 3.2 3.4 3.1   Basophil % % 0.4 0.4 0.3       Metabolic Panel (last 72 hours):  Recent Labs   Lab Result Units 08/27/24  0408 08/28/24  0350 08/29/24 2012   Sodium mmol/L 140 139  --    Potassium mmol/L 3.6 3.6  --    Chloride mmol/L 102 100  --    CO2 mmol/L 28 28  --    Glucose mg/dL 122* 153*  --    Blood Urea Nitrogen mg/dL 10.8 17.2  --    Creatinine mg/dL 0.82 1.04 1.29*       Aminoglycoside Administrations:  aminoglycosides given in last 96 hours                     tobramycin (NEBCIN) 320 mg in D5W 100 mL IVPB (mg) 320 mg New Bag 08/28/24 2147    tobramycin (NEBCIN) 445 mg in D5W 100 mL IVPB (mg) 445 mg New Bag 08/27/24 1752     445 mg New Bag 08/26/24 1805                    Microbiologic Results:  Microbiology Results (last 7 days)       Procedure Component Value Units Date/Time    Blood Culture [[id number]]  (Normal) Collected:  08/21/24 1644    Order Status: Completed Specimen: Blood, Venous Updated: 08/26/24 1900     Blood Culture No Growth at 5 days    Blood Culture [[id number]]  (Normal) Collected: 08/21/24 1644    Order Status: Completed Specimen: Blood, Venous Updated: 08/26/24 1900     Blood Culture No Growth at 5 days    Blood Culture #1 **CANNOT BE ORDERED STAT** [[id number]]  (Normal) Collected: 08/20/24 1232    Order Status: Completed Specimen: Blood Updated: 08/25/24 1300     Blood Culture No Growth at 5 days    Wound Culture [[id number]]  (Abnormal)  (Susceptibility) Collected: 08/20/24 1527    Order Status: Completed Specimen: Abscess from Sacral Updated: 08/24/24 1134     Wound Culture Few Escherichia coli ESBL      Few Pseudomonas aeruginosa     Comment: CRPA (Carbapenem-resistant Pseudomonas areuginosa)         Few Trichosporon asahii    Blood Culture #2 **CANNOT BE ORDERED STAT** [[id number]]  (Abnormal)  (Susceptibility) Collected: 08/20/24 1540    Order Status: Completed Specimen: Blood Updated: 08/23/24 1237     Blood Culture Escherichia coli ESBL     GRAM STAIN Gram Negative Rods      Seen in gram stain of broth only      2 of 2 bottles positive    Urine culture [[id number]]  (Abnormal)  (Susceptibility) Collected: 08/20/24 1338    Order Status: Completed Specimen: Urine Updated: 08/23/24 0743     Urine Culture >/= 100,000 colonies/ml Escherichia coli ESBL                 Passed

## 2024-08-30 NOTE — PROGRESS NOTES
"Gastroenterology Progress Note    Subjective/Interval History:    EGD 8/29/24: For mild anemia and FOBT +:  Grade B esophagitis- started on PPI BID; Moderate gastritis and gastric congestion with biopsies. Multiple erosions with no bleeding r/t pill erosion in cardia- biopsied.     Colonoscopy was planned for today based on FOBT+. However, his anemia has been stable and is even improved today at 11.0/35.6.  Patient did not complete his bowel prep drink and did not have any bowel movements after the first bottle of suprep. He is attempting to have a bowel movement now-   If he does not have clear return, he can complete colonoscopy with Dr. Franco outpatient.     He is due for polyp surveillance per wife- this is an outpatient reason for colonoscopy    He is accepted to LT[facility] with plans to go today.     ROS:  Review of Systems   Constitutional:  Negative for chills, fever and weight loss.   HENT:  Negative for hearing loss.    Eyes:  Negative for blurred vision.   Respiratory:  Negative for cough, shortness of breath and wheezing.    Cardiovascular:  Negative for chest pain and leg swelling.   Gastrointestinal:  Negative for abdominal pain, constipation, diarrhea, heartburn, nausea and vomiting.   Genitourinary:  Negative for dysuria.   Musculoskeletal:  Negative for myalgias.   Skin:  Negative for rash.   Neurological:  Negative for dizziness, weakness and headaches.   Psychiatric/Behavioral:  Negative for depression and memory loss. The patient is not nervous/anxious.    Vital Signs:  /84   Pulse 61   Temp 97.5 °F (36.4 °C) (Oral)   Resp 18   Ht 5' 10" (1.778 m)   Wt 63.5 kg (139 lb 15.9 oz)   SpO2 97%   BMI 20.09 kg/m²   Body mass index is 20.09 kg/m².    Physical Exam:  Constitutional:       General: He is not in acute distress.     Appearance: Normal appearance. He is not ill-appearing.   HENT:      Head: Normocephalic.      Mouth/Throat:      Mouth: Mucous membranes are moist.      Pharynx: " Oropharynx is clear.   Eyes:      General: No scleral icterus.     Pupils: Pupils are equal, round, and reactive to light.   Cardiovascular:      Rate and Rhythm: Normal rate and regular rhythm.      Pulses: Normal pulses.      Heart sounds: Normal heart sounds. No murmur heard.  Pulmonary:      Effort: No respiratory distress.      Breath sounds: Normal breath sounds. No wheezing or rales.   Abdominal:      General: Bowel sounds are normal. There is no distension.      Palpations: Abdomen is soft.      Tenderness: There is no abdominal tenderness. There is no guarding.   Musculoskeletal:      Cervical back: Normal range of motion.   Skin:     General: Skin is warm and dry.      Coloration: Skin is not jaundiced.   Neurological:      Mental Status: He is alert and oriented to person, place, and time.   Psychiatric:         Mood and Affect: Mood normal.         Behavior: Behavior normal.     Labs:  Recent Results (from the past 48 hour(s))   Occult Blood, Stool 1st Specimen    Collection Time: 08/28/24 10:09 AM   Result Value Ref Range    Stool Color 1 Black     Stool Consistancy 1 formed     Occult Blood Stool 1 Positive (A) Negative   TOBRAMYCIN, TROUGH    Collection Time: 08/28/24  4:21 PM   Result Value Ref Range    Tobramycin Trough 1.4 0.3 - 2.0 ug/ml   CBC with Differential    Collection Time: 08/29/24  4:00 AM   Result Value Ref Range    WBC 7.32 4.50 - 11.50 x10(3)/mcL    RBC 4.11 (L) 4.70 - 6.10 x10(6)/mcL    Hgb 10.4 (L) 14.0 - 18.0 g/dL    Hct 33.5 (L) 42.0 - 52.0 %    MCV 81.5 80.0 - 94.0 fL    MCH 25.3 (L) 27.0 - 31.0 pg    MCHC 31.0 (L) 33.0 - 36.0 g/dL    RDW 17.2 (H) 11.5 - 17.0 %    Platelet 648 (H) 130 - 400 x10(3)/mcL    MPV 9.2 7.4 - 10.4 fL    Neut % 47.3 %    Lymph % 38.7 %    Mono % 10.2 %    Eos % 3.1 %    Basophil % 0.3 %    Lymph # 2.83 0.6 - 4.6 x10(3)/mcL    Neut # 3.46 2.1 - 9.2 x10(3)/mcL    Mono # 0.75 0.1 - 1.3 x10(3)/mcL    Eos # 0.23 0 - 0.9 x10(3)/mcL    Baso # 0.02 <=0.2  x10(3)/mcL    IG# 0.03 0 - 0.04 x10(3)/mcL    IG% 0.4 %    NRBC% 0.0 %   TOBRAMYCIN, TROUGH    Collection Time: 08/29/24  8:12 PM   Result Value Ref Range    Tobramycin Trough 1.7 0.3 - 2.0 ug/ml   Creatinine, Serum    Collection Time: 08/29/24  8:12 PM   Result Value Ref Range    Creatinine 1.29 (H) 0.73 - 1.18 mg/dL    eGFR >60 mL/min/1.73/m2   Hemoglobin and Hematocrit    Collection Time: 08/30/24  8:49 AM   Result Value Ref Range    Hgb 11.0 (L) 14.0 - 18.0 g/dL    Hct 35.6 (L) 42.0 - 52.0 %       Imaging:  US Pelvis Limited Non OB    Result Date: 8/25/2024  EXAMINATION: US PELVIS LIMITED NON OB CLINICAL HISTORY: evaluate if lentz balloon is in bladder; TECHNIQUE: Transabdominal ultrasound of the pelvis. COMPARISON: Pelvic CT 06/01/2024 FINDINGS: Catheter balloon lies within the bladder lumen.  Calculated bladder volume 244 mL.     Catheter balloon in the bladder lumen. Electronically signed by: Gustavo Cassidy Date:    08/25/2024 Time:    14:52    Echo    Result Date: 8/25/2024    Left Ventricle: The left ventricle is normal in size. Normal wall thickness. There is normal systolic function with a visually estimated ejection fraction of 60 - 65%. Grade I diastolic dysfunction.   Right Ventricle: Normal right ventricular cavity size. Systolic function is normal.   Aortic Valve: The aortic valve is structurally normal. Aortic valve peak velocity is 1.12 m/s. Mean gradient is 3 mmHg.   Mitral Valve: The mitral valve is structurally normal. There is mild regurgitation.   Tricuspid Valve: There is moderate regurgitation. There is pulmonary hypertension. The estimated PA systolic pressure is at least 70 mmHg.   Pulmonic Valve: There is mild regurgitation.   Pulmonary Artery: There is severe pulmonary hypertension. The estimated pulmonary artery systolic pressure is 78 mmHg.   IVC/SVC: Intermediate venous pressure at 8 mmHg.     CTA Chest Non-Coronary (PE Studies)    Result Date: 8/23/2024  EXAMINATION: CTA CHEST NON  CORONARY (PE STUDIES) CLINICAL HISTORY: Pulmonary embolism (PE) suspected, positive D-dimer; TECHNIQUE: Sequential axial images performed of the chest using pulmonary embolism protocol following intravenous contrast bolus. Sagittal and contrast reformations performed. Dose product length of 366 mGycm. Automated exposure control was utilized to minimize radiation dose. COMPARISON: Chest radiograph August 23, 2024 FINDINGS: Optimal contrast bolus timing with adequately opacified pulmonary artery system is without evidence of filling defects from pulmonary thromboembolism within the main pulmonary artery and the major branches.  Bilateral lower lung lobe pulmonary arterial assessment is nondiagnostic due to lungs collapse consolidations.  Thoracic aorta is without aneurysmal dilatation or dissection..  Cardiac chambers are mildly enlarged in size.  No pericardial effusion.  There is aorticopulmonary window mildly lower 1.5 cm lymph nodes.  Left chest implanted cardiac device electrodes terminate with the right atrium and right ventricle.  Right thyroid gland 7 mm nodule on image 2 series 10.. Bilateral pleural spaces from the base to the apex are expanded by moderate to large pleural effusions.  Associated considerable atelectasis of the lower lung zones without exclusion of associated infiltrates.  Lungs are also remarkable mild congestive changes. Left kidney upper pole exophytic cystic structure remains stable compared to July 4, 2023 CT abdomen and for this no specific follow-up imaging is recommended. Thoracic kyphoscoliosis and multilevel posterior fusions.  Demineralization of the bones.     1.  No pulmonary embolism identified. 2.  Bilateral moderate to large volume pleural effusions. 3.  Bilateral lower lung zones compressive atelectasis without exclusion of associated consolidations. 4.  Details of other findings above. Electronically signed by: Lv Mg Date:    08/23/2024 Time:    17:29    X-Ray Chest 1  View    Result Date: 8/23/2024  EXAMINATION: XR CHEST 1 VIEW CPT 23944 CLINICAL HISTORY: Sob; COMPARISON: August 20, 2024 FINDINGS: Examination reveals cardiomediastinal silhouette to be unchanged as compared with the previous exam. There is some increase in interstitial and pulmonary vascular markings. There is blunting of both costophrenic angles indicating the presence of bilateral pleural effusions. There is increased left retrocardiac density and silhouetting of the left hemidiaphragm that might be related to pleural fluid but may also represent an infiltrate/atelectasis     Increase interstitial and pulmonary vascular markings indicating some degree of pulmonary vascular congestion and cardiac decompensation. Bilateral pleural effusions. Increased left retrocardiac density and silhouetting of the left hemidiaphragm as above Electronically signed by: Rob Arauz Date:    08/23/2024 Time:    09:43    X-Ray Chest AP Portable    Result Date: 8/20/2024  EXAMINATION: XR CHEST AP PORTABLE CLINICAL HISTORY: , Cough, unspecified. COMPARISON: May 30, 2024 FINDINGS: No alveolar consolidation, effusion, or pneumothorax is seen.   The thoracic aorta is normal  cardiac silhouette, central pulmonary vessels and mediastinum are normal in size and are grossly unremarkable.  There has been surgical manipulation of the thoracic spine     No acute chest disease is identified. Electronically signed by: Rob Arauz Date:    08/20/2024 Time:    12:29         Assessment/Plan:  Chronic Anemia- stable and improved  Iron deficiency noted  FOB +  EGD completed- see HPI  PPI BID indefinitely   Xarelto for A fib- ok to resume  Complicated UTI with sepsis- see Hospitalist note        EGD while inpatient.   Outpatient routine colonoscopy with Dr. Salvador Denson for discharge to LT[facility] as planned     Thank you for allowing us to participate in the care of Bianca Khan.       Rosa Isela Orellana NP acting as scribe for Dr. Ren  MD Luca

## 2024-08-30 NOTE — NURSING
Nurses Note -- 4 Eyes      8/29/2024   7:51 PM      Skin assessed during: Q Shift Change      [] No Altered Skin Integrity Present    []Prevention Measures Documented      [x] Yes- Altered Skin Integrity Present or Discovered   [] LDA Added if Not in Epic (Describe Wound)   [] New Altered Skin Integrity was Present on Admit and Documented in LDA   [] Wound Image Taken    Wound Care Consulted? Yes    Attending Nurse:  Sheryl Bernabe RN/Staff Member:   Merlene

## 2024-09-02 PROBLEM — A41.9 SEPSIS: Status: RESOLVED | Noted: 2024-05-30 | Resolved: 2024-09-02

## 2024-09-04 LAB — FUNGUS BLD CULT: NORMAL

## 2024-10-08 ENCOUNTER — HOSPITAL ENCOUNTER (INPATIENT)
Facility: HOSPITAL | Age: 59
LOS: 49 days | Discharge: HOSPICE | DRG: 853 | End: 2024-11-26
Attending: STUDENT IN AN ORGANIZED HEALTH CARE EDUCATION/TRAINING PROGRAM | Admitting: INTERNAL MEDICINE
Payer: MEDICARE

## 2024-10-08 DIAGNOSIS — Z78.9 PACED RHYTHM ON CARDIAC MONITOR: ICD-10-CM

## 2024-10-08 DIAGNOSIS — A49.9 ESBL (EXTENDED SPECTRUM BETA-LACTAMASE) PRODUCING BACTERIA INFECTION: ICD-10-CM

## 2024-10-08 DIAGNOSIS — L89.514 PRESSURE ULCER OF RIGHT ANKLE, STAGE 4: Chronic | ICD-10-CM

## 2024-10-08 DIAGNOSIS — R00.0 TACHYCARDIA: ICD-10-CM

## 2024-10-08 DIAGNOSIS — Z93.59 PRESENCE OF SUPRAPUBIC CATHETER: ICD-10-CM

## 2024-10-08 DIAGNOSIS — R60.9 EDEMA: ICD-10-CM

## 2024-10-08 DIAGNOSIS — R58 HEMORRHAGE: ICD-10-CM

## 2024-10-08 DIAGNOSIS — Z16.12 ESBL (EXTENDED SPECTRUM BETA-LACTAMASE) PRODUCING BACTERIA INFECTION: ICD-10-CM

## 2024-10-08 DIAGNOSIS — L89.314 PRESSURE ULCER OF RIGHT BUTTOCK, STAGE 4: Chronic | ICD-10-CM

## 2024-10-08 DIAGNOSIS — R58 RETROPERITONEAL HEMORRHAGE: Primary | ICD-10-CM

## 2024-10-08 DIAGNOSIS — I95.9 HYPOTENSION: ICD-10-CM

## 2024-10-08 DIAGNOSIS — S31.000A SACRAL WOUND: ICD-10-CM

## 2024-10-08 DIAGNOSIS — I82.409 DVT (DEEP VENOUS THROMBOSIS): ICD-10-CM

## 2024-10-08 DIAGNOSIS — E44.0 MODERATE MALNUTRITION: ICD-10-CM

## 2024-10-08 DIAGNOSIS — R07.9 CHEST PAIN: ICD-10-CM

## 2024-10-08 DIAGNOSIS — R60.9 1+ PITTING EDEMA: ICD-10-CM

## 2024-10-08 LAB
ALBUMIN SERPL-MCNC: 2.4 G/DL (ref 3.4–4.8)
ALBUMIN/GLOB SERPL: 0.5 RATIO (ref 1.1–2)
ALP SERPL-CCNC: 80 UNIT/L (ref 40–150)
ALT SERPL-CCNC: 10 UNIT/L (ref 0–55)
ANION GAP SERPL CALC-SCNC: 11 MEQ/L
AST SERPL-CCNC: 18 UNIT/L (ref 5–34)
BACTERIA #/AREA URNS AUTO: ABNORMAL /HPF
BASOPHILS # BLD AUTO: 0.03 X10(3)/MCL
BASOPHILS NFR BLD AUTO: 0.3 %
BILIRUB SERPL-MCNC: 0.3 MG/DL
BILIRUB UR QL STRIP.AUTO: NEGATIVE
BUN SERPL-MCNC: 21.4 MG/DL (ref 8.4–25.7)
CALCIUM SERPL-MCNC: 8.5 MG/DL (ref 8.8–10)
CHLORIDE SERPL-SCNC: 107 MMOL/L (ref 98–107)
CLARITY UR: CLEAR
CO2 SERPL-SCNC: 19 MMOL/L (ref 23–31)
COLOR UR AUTO: ABNORMAL
CREAT SERPL-MCNC: 1.51 MG/DL (ref 0.73–1.18)
CREAT/UREA NIT SERPL: 14
CRP SERPL-MCNC: 155.8 MG/L
EOSINOPHIL # BLD AUTO: 0.08 X10(3)/MCL (ref 0–0.9)
EOSINOPHIL NFR BLD AUTO: 0.8 %
ERYTHROCYTE [DISTWIDTH] IN BLOOD BY AUTOMATED COUNT: 17.9 % (ref 11.5–17)
ERYTHROCYTE [SEDIMENTATION RATE] IN BLOOD: 98 MM/HR (ref 0–15)
GFR SERPLBLD CREATININE-BSD FMLA CKD-EPI: 53 ML/MIN/1.73/M2
GLOBULIN SER-MCNC: 5.1 GM/DL (ref 2.4–3.5)
GLUCOSE SERPL-MCNC: 120 MG/DL (ref 82–115)
GLUCOSE UR QL STRIP: NORMAL
HCT VFR BLD AUTO: 27.9 % (ref 42–52)
HGB BLD-MCNC: 8.5 G/DL (ref 14–18)
HGB UR QL STRIP: NEGATIVE
IMM GRANULOCYTES # BLD AUTO: 0.05 X10(3)/MCL (ref 0–0.04)
IMM GRANULOCYTES NFR BLD AUTO: 0.5 %
KETONES UR QL STRIP: NEGATIVE
LACTATE SERPL-SCNC: 2 MMOL/L (ref 0.5–2.2)
LEUKOCYTE ESTERASE UR QL STRIP: 75
LYMPHOCYTES # BLD AUTO: 2.7 X10(3)/MCL (ref 0.6–4.6)
LYMPHOCYTES NFR BLD AUTO: 28.3 %
MCH RBC QN AUTO: 24.9 PG (ref 27–31)
MCHC RBC AUTO-ENTMCNC: 30.5 G/DL (ref 33–36)
MCV RBC AUTO: 81.6 FL (ref 80–94)
MONOCYTES # BLD AUTO: 0.97 X10(3)/MCL (ref 0.1–1.3)
MONOCYTES NFR BLD AUTO: 10.2 %
MUCOUS THREADS URNS QL MICRO: ABNORMAL /LPF
NEUTROPHILS # BLD AUTO: 5.71 X10(3)/MCL (ref 2.1–9.2)
NEUTROPHILS NFR BLD AUTO: 59.9 %
NITRITE UR QL STRIP: NEGATIVE
NRBC BLD AUTO-RTO: 0 %
PH UR STRIP: 6 [PH]
PLATELET # BLD AUTO: 492 X10(3)/MCL (ref 130–400)
PMV BLD AUTO: 8.8 FL (ref 7.4–10.4)
POTASSIUM SERPL-SCNC: 4 MMOL/L (ref 3.5–5.1)
PROT SERPL-MCNC: 7.5 GM/DL (ref 5.8–7.6)
PROT UR QL STRIP: NEGATIVE
RBC # BLD AUTO: 3.42 X10(6)/MCL (ref 4.7–6.1)
RBC #/AREA URNS AUTO: ABNORMAL /HPF
SODIUM SERPL-SCNC: 137 MMOL/L (ref 136–145)
SP GR UR STRIP.AUTO: 1.04 (ref 1–1.03)
SQUAMOUS #/AREA URNS LPF: ABNORMAL /HPF
UROBILINOGEN UR STRIP-ACNC: NORMAL
WBC # BLD AUTO: 9.54 X10(3)/MCL (ref 4.5–11.5)
WBC #/AREA URNS AUTO: ABNORMAL /HPF

## 2024-10-08 PROCEDURE — 85025 COMPLETE CBC W/AUTO DIFF WBC: CPT | Performed by: PHYSICIAN ASSISTANT

## 2024-10-08 PROCEDURE — 85652 RBC SED RATE AUTOMATED: CPT | Performed by: STUDENT IN AN ORGANIZED HEALTH CARE EDUCATION/TRAINING PROGRAM

## 2024-10-08 PROCEDURE — 86140 C-REACTIVE PROTEIN: CPT | Performed by: STUDENT IN AN ORGANIZED HEALTH CARE EDUCATION/TRAINING PROGRAM

## 2024-10-08 PROCEDURE — 96367 TX/PROPH/DG ADDL SEQ IV INF: CPT

## 2024-10-08 PROCEDURE — 63600175 PHARM REV CODE 636 W HCPCS: Performed by: STUDENT IN AN ORGANIZED HEALTH CARE EDUCATION/TRAINING PROGRAM

## 2024-10-08 PROCEDURE — 11000001 HC ACUTE MED/SURG PRIVATE ROOM

## 2024-10-08 PROCEDURE — 80053 COMPREHEN METABOLIC PANEL: CPT | Performed by: PHYSICIAN ASSISTANT

## 2024-10-08 PROCEDURE — 25000003 PHARM REV CODE 250: Performed by: INTERNAL MEDICINE

## 2024-10-08 PROCEDURE — 25000003 PHARM REV CODE 250: Performed by: PHYSICIAN ASSISTANT

## 2024-10-08 PROCEDURE — 96365 THER/PROPH/DIAG IV INF INIT: CPT

## 2024-10-08 PROCEDURE — 96366 THER/PROPH/DIAG IV INF ADDON: CPT

## 2024-10-08 PROCEDURE — 87086 URINE CULTURE/COLONY COUNT: CPT | Performed by: STUDENT IN AN ORGANIZED HEALTH CARE EDUCATION/TRAINING PROGRAM

## 2024-10-08 PROCEDURE — 99285 EMERGENCY DEPT VISIT HI MDM: CPT | Mod: 25

## 2024-10-08 PROCEDURE — 87641 MR-STAPH DNA AMP PROBE: CPT | Performed by: INTERNAL MEDICINE

## 2024-10-08 PROCEDURE — 83605 ASSAY OF LACTIC ACID: CPT | Performed by: PHYSICIAN ASSISTANT

## 2024-10-08 PROCEDURE — 87040 BLOOD CULTURE FOR BACTERIA: CPT | Performed by: PHYSICIAN ASSISTANT

## 2024-10-08 PROCEDURE — 63600175 PHARM REV CODE 636 W HCPCS: Mod: JZ,JG | Performed by: INTERNAL MEDICINE

## 2024-10-08 PROCEDURE — 25000003 PHARM REV CODE 250: Performed by: STUDENT IN AN ORGANIZED HEALTH CARE EDUCATION/TRAINING PROGRAM

## 2024-10-08 PROCEDURE — 81001 URINALYSIS AUTO W/SCOPE: CPT | Performed by: STUDENT IN AN ORGANIZED HEALTH CARE EDUCATION/TRAINING PROGRAM

## 2024-10-08 PROCEDURE — 25500020 PHARM REV CODE 255: Performed by: STUDENT IN AN ORGANIZED HEALTH CARE EDUCATION/TRAINING PROGRAM

## 2024-10-08 RX ORDER — AMOXICILLIN 250 MG
1 CAPSULE ORAL 2 TIMES DAILY
Status: DISCONTINUED | OUTPATIENT
Start: 2024-10-08 | End: 2024-11-06

## 2024-10-08 RX ORDER — SODIUM CHLORIDE 0.9 % (FLUSH) 0.9 %
10 SYRINGE (ML) INJECTION
Status: DISCONTINUED | OUTPATIENT
Start: 2024-10-08 | End: 2024-11-26 | Stop reason: HOSPADM

## 2024-10-08 RX ORDER — POLYETHYLENE GLYCOL 3350 17 G/17G
17 POWDER, FOR SOLUTION ORAL 2 TIMES DAILY PRN
Status: DISCONTINUED | OUTPATIENT
Start: 2024-10-08 | End: 2024-11-06

## 2024-10-08 RX ORDER — PANTOPRAZOLE SODIUM 40 MG/1
40 TABLET, DELAYED RELEASE ORAL 2 TIMES DAILY
COMMUNITY
Start: 2024-10-04

## 2024-10-08 RX ORDER — TALC
6 POWDER (GRAM) TOPICAL NIGHTLY PRN
Status: DISCONTINUED | OUTPATIENT
Start: 2024-10-08 | End: 2024-10-26

## 2024-10-08 RX ORDER — FUROSEMIDE 20 MG/1
20 TABLET ORAL DAILY
Status: ON HOLD | COMMUNITY
Start: 2024-10-04 | End: 2024-11-24 | Stop reason: HOSPADM

## 2024-10-08 RX ORDER — LISINOPRIL 20 MG/1
20 TABLET ORAL DAILY
Status: ON HOLD | COMMUNITY
Start: 2024-10-07 | End: 2024-11-24 | Stop reason: HOSPADM

## 2024-10-08 RX ORDER — IBUPROFEN 200 MG
24 TABLET ORAL
Status: DISCONTINUED | OUTPATIENT
Start: 2024-10-08 | End: 2024-11-16

## 2024-10-08 RX ORDER — MEROPENEM 1 G/1
1 INJECTION, POWDER, FOR SOLUTION INTRAVENOUS
Status: DISCONTINUED | OUTPATIENT
Start: 2024-10-08 | End: 2024-10-08

## 2024-10-08 RX ORDER — PROCHLORPERAZINE EDISYLATE 5 MG/ML
5 INJECTION INTRAMUSCULAR; INTRAVENOUS EVERY 6 HOURS PRN
Status: DISCONTINUED | OUTPATIENT
Start: 2024-10-08 | End: 2024-11-26 | Stop reason: HOSPADM

## 2024-10-08 RX ORDER — METHOCARBAMOL 750 MG/1
750 TABLET, FILM COATED ORAL 3 TIMES DAILY PRN
Status: DISCONTINUED | OUTPATIENT
Start: 2024-10-09 | End: 2024-11-26 | Stop reason: HOSPADM

## 2024-10-08 RX ORDER — FLUOXETINE HYDROCHLORIDE 20 MG/1
20 CAPSULE ORAL DAILY
Status: DISCONTINUED | OUTPATIENT
Start: 2024-10-09 | End: 2024-11-06

## 2024-10-08 RX ORDER — OXYCODONE AND ACETAMINOPHEN 10; 325 MG/1; MG/1
1 TABLET ORAL EVERY 6 HOURS PRN
Status: DISCONTINUED | OUTPATIENT
Start: 2024-10-08 | End: 2024-10-09

## 2024-10-08 RX ORDER — OXYBUTYNIN CHLORIDE 5 MG/1
5 TABLET ORAL 2 TIMES DAILY
Status: DISCONTINUED | OUTPATIENT
Start: 2024-10-09 | End: 2024-10-14

## 2024-10-08 RX ORDER — CELECOXIB 200 MG/1
200 CAPSULE ORAL DAILY
Status: ON HOLD | COMMUNITY
Start: 2024-10-07 | End: 2024-11-24 | Stop reason: HOSPADM

## 2024-10-08 RX ORDER — IBUPROFEN 200 MG
16 TABLET ORAL
Status: DISCONTINUED | OUTPATIENT
Start: 2024-10-08 | End: 2024-11-16

## 2024-10-08 RX ORDER — RIVAROXABAN 20 MG/1
20 TABLET, FILM COATED ORAL DAILY
Status: ON HOLD | COMMUNITY
Start: 2024-10-07 | End: 2024-11-24 | Stop reason: HOSPADM

## 2024-10-08 RX ORDER — ACETAMINOPHEN 325 MG/1
650 TABLET ORAL EVERY 4 HOURS PRN
Status: DISCONTINUED | OUTPATIENT
Start: 2024-10-08 | End: 2024-11-26 | Stop reason: HOSPADM

## 2024-10-08 RX ORDER — TRAZODONE HYDROCHLORIDE 100 MG/1
200 TABLET ORAL NIGHTLY PRN
Status: DISCONTINUED | OUTPATIENT
Start: 2024-10-09 | End: 2024-11-26 | Stop reason: HOSPADM

## 2024-10-08 RX ORDER — FLUCONAZOLE 2 MG/ML
200 INJECTION, SOLUTION INTRAVENOUS
Status: DISCONTINUED | OUTPATIENT
Start: 2024-10-08 | End: 2024-10-14

## 2024-10-08 RX ORDER — CARVEDILOL 12.5 MG/1
25 TABLET ORAL 2 TIMES DAILY
Status: DISCONTINUED | OUTPATIENT
Start: 2024-10-09 | End: 2024-10-17

## 2024-10-08 RX ORDER — ONDANSETRON HYDROCHLORIDE 2 MG/ML
4 INJECTION, SOLUTION INTRAVENOUS EVERY 4 HOURS PRN
Status: DISCONTINUED | OUTPATIENT
Start: 2024-10-08 | End: 2024-11-26 | Stop reason: HOSPADM

## 2024-10-08 RX ORDER — FENOFIBRATE 48 MG/1
48 TABLET, FILM COATED ORAL DAILY
Status: ON HOLD | COMMUNITY
Start: 2024-10-07 | End: 2024-11-24 | Stop reason: HOSPADM

## 2024-10-08 RX ORDER — GABAPENTIN 400 MG/1
400 CAPSULE ORAL EVERY 8 HOURS
COMMUNITY
Start: 2024-10-04

## 2024-10-08 RX ORDER — FENOFIBRATE 48 MG/1
48 TABLET, FILM COATED ORAL DAILY
Status: DISCONTINUED | OUTPATIENT
Start: 2024-10-09 | End: 2024-10-17

## 2024-10-08 RX ORDER — ATORVASTATIN CALCIUM 10 MG/1
20 TABLET, FILM COATED ORAL NIGHTLY
Status: DISCONTINUED | OUTPATIENT
Start: 2024-10-08 | End: 2024-10-17

## 2024-10-08 RX ORDER — INSULIN ASPART 100 [IU]/ML
1-10 INJECTION, SOLUTION INTRAVENOUS; SUBCUTANEOUS
Status: DISCONTINUED | OUTPATIENT
Start: 2024-10-08 | End: 2024-11-16

## 2024-10-08 RX ORDER — PANTOPRAZOLE SODIUM 40 MG/1
40 TABLET, DELAYED RELEASE ORAL DAILY
Status: DISCONTINUED | OUTPATIENT
Start: 2024-10-09 | End: 2024-10-18

## 2024-10-08 RX ORDER — HYDRALAZINE HYDROCHLORIDE 100 MG/1
100 TABLET, FILM COATED ORAL 3 TIMES DAILY
Status: ON HOLD | COMMUNITY
Start: 2024-10-07 | End: 2024-11-24 | Stop reason: HOSPADM

## 2024-10-08 RX ORDER — ALUMINUM HYDROXIDE, MAGNESIUM HYDROXIDE, AND SIMETHICONE 1200; 120; 1200 MG/30ML; MG/30ML; MG/30ML
30 SUSPENSION ORAL 4 TIMES DAILY PRN
Status: DISCONTINUED | OUTPATIENT
Start: 2024-10-08 | End: 2024-11-26 | Stop reason: HOSPADM

## 2024-10-08 RX ORDER — GLUCAGON 1 MG
1 KIT INJECTION
Status: DISCONTINUED | OUTPATIENT
Start: 2024-10-08 | End: 2024-11-16

## 2024-10-08 RX ORDER — AMOXICILLIN 250 MG
2 CAPSULE ORAL 2 TIMES DAILY PRN
Status: DISCONTINUED | OUTPATIENT
Start: 2024-10-08 | End: 2024-10-08

## 2024-10-08 RX ADMIN — VANCOMYCIN HYDROCHLORIDE 1500 MG: 1.5 INJECTION, POWDER, LYOPHILIZED, FOR SOLUTION INTRAVENOUS at 06:10

## 2024-10-08 RX ADMIN — ATORVASTATIN CALCIUM 20 MG: 10 TABLET, FILM COATED ORAL at 11:10

## 2024-10-08 RX ADMIN — IOHEXOL 100 ML: 350 INJECTION, SOLUTION INTRAVENOUS at 07:10

## 2024-10-08 RX ADMIN — SENNOSIDES AND DOCUSATE SODIUM 1 TABLET: 50; 8.6 TABLET ORAL at 11:10

## 2024-10-08 RX ADMIN — TOBRAMYCIN SULFATE 445 MG: 40 INJECTION, SOLUTION INTRAMUSCULAR; INTRAVENOUS at 09:10

## 2024-10-08 RX ADMIN — MEROPENEM 1 G: 1 INJECTION, POWDER, FOR SOLUTION INTRAVENOUS at 08:10

## 2024-10-08 RX ADMIN — SODIUM CHLORIDE 1905 ML: 9 INJECTION, SOLUTION INTRAVENOUS at 06:10

## 2024-10-08 RX ADMIN — GABAPENTIN 400 MG: 400 CAPSULE ORAL at 11:10

## 2024-10-08 RX ADMIN — METHYLNALTREXONE BROMIDE 12 MG: 12 INJECTION, SOLUTION SUBCUTANEOUS at 11:10

## 2024-10-09 LAB
ALBUMIN SERPL-MCNC: 2.5 G/DL (ref 3.4–4.8)
ALBUMIN/GLOB SERPL: 0.5 RATIO (ref 1.1–2)
ALP SERPL-CCNC: 88 UNIT/L (ref 40–150)
ALT SERPL-CCNC: 10 UNIT/L (ref 0–55)
ANION GAP SERPL CALC-SCNC: 11 MEQ/L
AST SERPL-CCNC: 14 UNIT/L (ref 5–34)
BASOPHILS # BLD AUTO: 0.01 X10(3)/MCL
BASOPHILS NFR BLD AUTO: 0.1 %
BILIRUB SERPL-MCNC: 0.3 MG/DL
BUN SERPL-MCNC: 16.4 MG/DL (ref 8.4–25.7)
CALCIUM SERPL-MCNC: 8.5 MG/DL (ref 8.8–10)
CHLORIDE SERPL-SCNC: 110 MMOL/L (ref 98–107)
CO2 SERPL-SCNC: 21 MMOL/L (ref 23–31)
CREAT SERPL-MCNC: 1.12 MG/DL (ref 0.73–1.18)
CREAT/UREA NIT SERPL: 15
EOSINOPHIL # BLD AUTO: 0.08 X10(3)/MCL (ref 0–0.9)
EOSINOPHIL NFR BLD AUTO: 1 %
ERYTHROCYTE [DISTWIDTH] IN BLOOD BY AUTOMATED COUNT: 18.2 % (ref 11.5–17)
EST. AVERAGE GLUCOSE BLD GHB EST-MCNC: 148.5 MG/DL
GFR SERPLBLD CREATININE-BSD FMLA CKD-EPI: >60 ML/MIN/1.73/M2
GLOBULIN SER-MCNC: 4.6 GM/DL (ref 2.4–3.5)
GLUCOSE SERPL-MCNC: 112 MG/DL (ref 82–115)
GLUCOSE SERPL-MCNC: 90 MG/DL (ref 70–110)
HBA1C MFR BLD: 6.8 %
HCT VFR BLD AUTO: 26.7 % (ref 42–52)
HGB BLD-MCNC: 8.2 G/DL (ref 14–18)
IMM GRANULOCYTES # BLD AUTO: 0.02 X10(3)/MCL (ref 0–0.04)
IMM GRANULOCYTES NFR BLD AUTO: 0.3 %
LYMPHOCYTES # BLD AUTO: 2.28 X10(3)/MCL (ref 0.6–4.6)
LYMPHOCYTES NFR BLD AUTO: 29.8 %
MAGNESIUM SERPL-MCNC: 1.7 MG/DL (ref 1.6–2.6)
MCH RBC QN AUTO: 24.5 PG (ref 27–31)
MCHC RBC AUTO-ENTMCNC: 30.7 G/DL (ref 33–36)
MCV RBC AUTO: 79.7 FL (ref 80–94)
MONOCYTES # BLD AUTO: 0.83 X10(3)/MCL (ref 0.1–1.3)
MONOCYTES NFR BLD AUTO: 10.8 %
MRSA PCR SCRN (OHS): NOT DETECTED
NEUTROPHILS # BLD AUTO: 4.44 X10(3)/MCL (ref 2.1–9.2)
NEUTROPHILS NFR BLD AUTO: 58 %
NRBC BLD AUTO-RTO: 0 %
PHOSPHATE SERPL-MCNC: 4 MG/DL (ref 2.3–4.7)
PLATELET # BLD AUTO: 529 X10(3)/MCL (ref 130–400)
PMV BLD AUTO: 8.8 FL (ref 7.4–10.4)
POCT GLUCOSE: 120 MG/DL (ref 70–110)
POCT GLUCOSE: 160 MG/DL (ref 70–110)
POCT GLUCOSE: 90 MG/DL (ref 70–110)
POTASSIUM SERPL-SCNC: 4.3 MMOL/L (ref 3.5–5.1)
PROT SERPL-MCNC: 7.1 GM/DL (ref 5.8–7.6)
RBC # BLD AUTO: 3.35 X10(6)/MCL (ref 4.7–6.1)
SODIUM SERPL-SCNC: 142 MMOL/L (ref 136–145)
TOBRAMYCIN TROUGH SERPL-MCNC: 0.9 UG/ML (ref 0.3–2)
TOBRAMYCIN TROUGH SERPL-MCNC: 4.7 UG/ML (ref 0.3–2)
WBC # BLD AUTO: 7.66 X10(3)/MCL (ref 4.5–11.5)

## 2024-10-09 PROCEDURE — 36415 COLL VENOUS BLD VENIPUNCTURE: CPT | Performed by: INTERNAL MEDICINE

## 2024-10-09 PROCEDURE — 25000003 PHARM REV CODE 250: Performed by: INTERNAL MEDICINE

## 2024-10-09 PROCEDURE — 87186 SC STD MICRODIL/AGAR DIL: CPT | Performed by: NURSE PRACTITIONER

## 2024-10-09 PROCEDURE — 25000003 PHARM REV CODE 250: Performed by: NURSE PRACTITIONER

## 2024-10-09 PROCEDURE — 80053 COMPREHEN METABOLIC PANEL: CPT | Performed by: INTERNAL MEDICINE

## 2024-10-09 PROCEDURE — 87075 CULTR BACTERIA EXCEPT BLOOD: CPT | Performed by: NURSE PRACTITIONER

## 2024-10-09 PROCEDURE — 85025 COMPLETE CBC W/AUTO DIFF WBC: CPT | Performed by: INTERNAL MEDICINE

## 2024-10-09 PROCEDURE — 80200 ASSAY OF TOBRAMYCIN: CPT | Performed by: STUDENT IN AN ORGANIZED HEALTH CARE EDUCATION/TRAINING PROGRAM

## 2024-10-09 PROCEDURE — 36415 COLL VENOUS BLD VENIPUNCTURE: CPT | Performed by: PHYSICIAN ASSISTANT

## 2024-10-09 PROCEDURE — 83036 HEMOGLOBIN GLYCOSYLATED A1C: CPT | Performed by: INTERNAL MEDICINE

## 2024-10-09 PROCEDURE — 84100 ASSAY OF PHOSPHORUS: CPT | Performed by: INTERNAL MEDICINE

## 2024-10-09 PROCEDURE — 87040 BLOOD CULTURE FOR BACTERIA: CPT | Performed by: PHYSICIAN ASSISTANT

## 2024-10-09 PROCEDURE — 11000001 HC ACUTE MED/SURG PRIVATE ROOM

## 2024-10-09 PROCEDURE — 63600175 PHARM REV CODE 636 W HCPCS: Performed by: INTERNAL MEDICINE

## 2024-10-09 PROCEDURE — 83735 ASSAY OF MAGNESIUM: CPT | Performed by: INTERNAL MEDICINE

## 2024-10-09 PROCEDURE — 80200 ASSAY OF TOBRAMYCIN: CPT | Performed by: INTERNAL MEDICINE

## 2024-10-09 RX ORDER — ENOXAPARIN SODIUM 100 MG/ML
1 INJECTION SUBCUTANEOUS EVERY 12 HOURS
Status: DISCONTINUED | OUTPATIENT
Start: 2024-10-09 | End: 2024-10-17

## 2024-10-09 RX ORDER — OXYCODONE AND ACETAMINOPHEN 10; 325 MG/1; MG/1
1 TABLET ORAL EVERY 4 HOURS PRN
Status: DISCONTINUED | OUTPATIENT
Start: 2024-10-09 | End: 2024-11-26 | Stop reason: HOSPADM

## 2024-10-09 RX ADMIN — OXYCODONE AND ACETAMINOPHEN 1 TABLET: 10; 325 TABLET ORAL at 07:10

## 2024-10-09 RX ADMIN — GABAPENTIN 400 MG: 400 CAPSULE ORAL at 01:10

## 2024-10-09 RX ADMIN — MEROPENEM 1 G: 1 INJECTION, POWDER, FOR SOLUTION INTRAVENOUS at 08:10

## 2024-10-09 RX ADMIN — GABAPENTIN 400 MG: 400 CAPSULE ORAL at 05:10

## 2024-10-09 RX ADMIN — SENNOSIDES AND DOCUSATE SODIUM 1 TABLET: 50; 8.6 TABLET ORAL at 08:10

## 2024-10-09 RX ADMIN — ENOXAPARIN SODIUM 70 MG: 80 INJECTION SUBCUTANEOUS at 05:10

## 2024-10-09 RX ADMIN — FLUCONAZOLE 200 MG: 2 INJECTION, SOLUTION INTRAVENOUS at 12:10

## 2024-10-09 RX ADMIN — OXYCODONE AND ACETAMINOPHEN 1 TABLET: 10; 325 TABLET ORAL at 12:10

## 2024-10-09 RX ADMIN — TOBRAMYCIN SULFATE 445 MG: 40 INJECTION, SOLUTION INTRAMUSCULAR; INTRAVENOUS at 10:10

## 2024-10-09 RX ADMIN — GABAPENTIN 400 MG: 400 CAPSULE ORAL at 10:10

## 2024-10-09 RX ADMIN — FLUOXETINE HYDROCHLORIDE 20 MG: 20 CAPSULE ORAL at 08:10

## 2024-10-09 RX ADMIN — OXYCODONE AND ACETAMINOPHEN 1 TABLET: 10; 325 TABLET ORAL at 11:10

## 2024-10-09 RX ADMIN — MEROPENEM 1 G: 1 INJECTION, POWDER, FOR SOLUTION INTRAVENOUS at 12:10

## 2024-10-09 RX ADMIN — OXYCODONE AND ACETAMINOPHEN 1 TABLET: 10; 325 TABLET ORAL at 01:10

## 2024-10-09 RX ADMIN — OXYBUTYNIN CHLORIDE 5 MG: 5 TABLET ORAL at 08:10

## 2024-10-09 RX ADMIN — PANTOPRAZOLE SODIUM 40 MG: 40 TABLET, DELAYED RELEASE ORAL at 08:10

## 2024-10-09 RX ADMIN — FLUCONAZOLE 200 MG: 2 INJECTION, SOLUTION INTRAVENOUS at 10:10

## 2024-10-09 RX ADMIN — CARVEDILOL 25 MG: 12.5 TABLET, FILM COATED ORAL at 08:10

## 2024-10-09 RX ADMIN — METHYLNALTREXONE BROMIDE 12 MG: 12 INJECTION, SOLUTION SUBCUTANEOUS at 08:10

## 2024-10-09 RX ADMIN — TRAZODONE HYDROCHLORIDE 200 MG: 100 TABLET ORAL at 08:10

## 2024-10-09 RX ADMIN — METHOCARBAMOL 750 MG: 750 TABLET ORAL at 07:10

## 2024-10-09 RX ADMIN — MEROPENEM 1 G: 1 INJECTION, POWDER, FOR SOLUTION INTRAVENOUS at 04:10

## 2024-10-09 RX ADMIN — ATORVASTATIN CALCIUM 20 MG: 10 TABLET, FILM COATED ORAL at 08:10

## 2024-10-09 RX ADMIN — FENOFIBRATE 48 MG: 48 TABLET, FILM COATED ORAL at 08:10

## 2024-10-09 RX ADMIN — TRAZODONE HYDROCHLORIDE 200 MG: 100 TABLET ORAL at 12:10

## 2024-10-09 NOTE — PROGRESS NOTES
Pharmacokinetic Initial Assessment: Tobramycin    Assessment:  Weight utilized for dose calculation: Actual Body Weight  Dosing method utilized: extended interval dosing    Plan: Extended interval dosing regimen: Tobramycin 445 mg IV once, followed by a random level to be drawn on 10/09/24 at 0700, 8-12 hours after the first dose.    Pharmacy will continue to monitor.    Please contact pharmacy at extension [phone] with any questions regarding this assessment.    Thank you for the consult,    Veronica Garcia       Patient brief summary:  Bianca Khan is a 59 y.o. male initiated on aminoglycoside therapy for treatment of suspected skin & soft tissue infection, potential osteomyelitis and/or bacteremia    Drug Allergies:   Review of patient's allergies indicates:   Allergen Reactions    Baclofen Itching and Anxiety       Actual Body Weight:   63.5 kg    Adjust Body Weight:   63.5 kg    Ideal Body Weight:  73 kg    Renal Function:   Estimated Creatinine Clearance: 46.7 mL/min (A) (based on SCr of 1.51 mg/dL (H)).,     Dialysis Method (if applicable):  N/A    CBC (last 72 hours):  Recent Labs   Lab Result Units 10/08/24  1817   WBC x10(3)/mcL 9.54   Hgb g/dL 8.5*   Hct % 27.9*   Platelet x10(3)/mcL 492*   Mono % % 10.2   Eos % % 0.8   Basophil % % 0.3       Metabolic Panel (last 72 hours):  Recent Labs   Lab Result Units 10/08/24  1817   Sodium mmol/L 137   Potassium mmol/L 4.0   Chloride mmol/L 107   CO2 mmol/L 19*   Glucose mg/dL 120*   Blood Urea Nitrogen mg/dL 21.4   Creatinine mg/dL 1.51*   Albumin g/dL 2.4*   Bilirubin Total mg/dL 0.3   ALP unit/L 80   AST unit/L 18   ALT unit/L 10       Microbiologic Results:  Microbiology Results (last 7 days)       Procedure Component Value Units Date/Time    Blood culture #1 **CANNOT BE ORDERED STAT** [[id number]] Collected: 10/08/24 1817    Order Status: Resulted Specimen: Blood Updated: 10/08/24 1844    Blood culture #2 **CANNOT BE ORDERED STAT** [[id number]]     Order Status:  Sent Specimen: Blood

## 2024-10-09 NOTE — PROGRESS NOTES
"Pharmacokinetic Follow Up: Tobramycin    Assessment of levels:   Random concentration of 4.7 mcg/mL (8 hours post-infusion) corresponds to a dosing interval of every 24 hours per the [place] [employer]    Regimen Plan:   Will check trough concentration 60 min prior to the dose on 10/09 at 2000.    Drug levels (last 3 results):  No results for input(s): "AMIKACINPEAK", "AMIKACINTROU", "AMIKACINRAND", "AMIKACIN" in the last 72 hours.    No results for input(s): "GENTAMICIN" in the last 72 hours.    Invalid input(s): "GENTPEAK", "GENTTROUGH", "GENT10", "GENT12", "GENT8", "GENTRANDOM"    Recent Labs   Lab Result Units 10/09/24  0521   Tobramycin Trough ug/ml 4.7*       Pharmacy will continue to monitor.    Please contact pharmacy at extension [phone] with any questions regarding this assessment.    Thank you for the consult,   Aleks Burgess      Patient brief summary:  Bianca Khan is a 59 y.o. male initiated on aminoglycoside therapy for treatment of bone/joint infection    Drug Allergies:   Review of patient's allergies indicates:   Allergen Reactions    Baclofen Itching and Anxiety       Actual Body Weight:   73kg    Adjust Body Weight:   73kg    Ideal Body Weight:  73kg    Renal Function:   Estimated Creatinine Clearance: 72.4 mL/min (based on SCr of 1.12 mg/dL).,     Dialysis Method (if applicable):  N/A    CBC (last 72 hours):  Recent Labs   Lab Result Units 10/08/24  1817 10/09/24  0521   WBC x10(3)/mcL 9.54 7.66   Hgb g/dL 8.5* 8.2*   Hemoglobin A1c %  --  6.8   Hct % 27.9* 26.7*   Platelet x10(3)/mcL 492* 529*   Mono % % 10.2 10.8   Eos % % 0.8 1.0   Basophil % % 0.3 0.1       Metabolic Panel (last 72 hours):  Recent Labs   Lab Result Units 10/08/24  1817 10/08/24  2338 10/09/24  0521   Sodium mmol/L 137  --  142   Potassium mmol/L 4.0  --  4.3   Chloride mmol/L 107  --  110*   CO2 mmol/L 19*  --  21*   Glucose mg/dL 120*  --  112   Glucose, UA   --  Normal  --    Blood Urea Nitrogen mg/dL 21.4  --  16.4 "   Creatinine mg/dL 1.51*  --  1.12   Albumin g/dL 2.4*  --  2.5*   Bilirubin Total mg/dL 0.3  --  0.3   ALP unit/L 80  --  88   AST unit/L 18  --  14   ALT unit/L 10  --  10   Magnesium Level mg/dL  --   --  1.70   Phosphorus Level mg/dL  --   --  4.0       Aminoglycoside Administrations:  aminoglycosides given in last 96 hours                     tobramycin (NEBCIN) 445 mg in D5W 100 mL IVPB (mg) 445 mg New Bag 10/08/24 2129                    Microbiologic Results:  Microbiology Results (last 7 days)       Procedure Component Value Units Date/Time    Blood culture #2 **CANNOT BE ORDERED STAT** [[id number]] Collected: 10/09/24 0521    Order Status: Sent Specimen: Blood from Arm, Right Updated: 10/09/24 0546    Urine culture [[id number]] Collected: 10/08/24 2338    Order Status: Sent Specimen: Urine Updated: 10/08/24 2349    Blood culture #1 **CANNOT BE ORDERED STAT** [[id number]] Collected: 10/08/24 1817    Order Status: Resulted Specimen: Blood Updated: 10/08/24 1841
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
## 2024-10-09 NOTE — CONSULTS
[facility] - 5th Floor Med Surg  Wound Care    Patient Name:  Bianca Khan   MRN:  [id number]  Date: 10/9/2024  Diagnosis: Sacral wound    History:     Past Medical History:   Diagnosis Date    Arthritis     Chronic ulcer of ankle 2022    ESBL (extended spectrum beta-lactamase) producing bacteria infection 2024    Frequent UTI 2019    Generalized anxiety disorder 2022    Neurogenic bladder 2022    Osteomyelitis 2022    Paraplegia     Presence of suprapubic catheter 2022    Pure hypercholesterolemia 2022    Retention of urine, unspecified 2019    Spinal cord injury at T1-T6 level 2018       Social History     Socioeconomic History    Marital status:    Tobacco Use    Smoking status: Some Days     Current packs/day: 0.00     Average packs/day: 0.2 packs/day for 41.5 years (10.4 ttl pk-yrs)     Types: Cigars, Cigarettes     Start date: 1982     Last attempt to quit: 2023     Years since quittin.2    Smokeless tobacco: Never   Substance and Sexual Activity    Alcohol use: Not Currently     Alcohol/week: 2.0 standard drinks of alcohol     Types: 2 Cans of beer per week    Drug use: Not Currently     Types: Oxycodone    Sexual activity: Not Currently     Partners: Female     Birth control/protection: None     Social Drivers of Health     Financial Resource Strain: Low Risk  (2023)    Overall Financial Resource Strain (CARDIA)     Difficulty of Paying Living Expenses: Not very hard   Food Insecurity: No Food Insecurity (2023)    Hunger Vital Sign     Worried About Running Out of Food in the Last Year: Never true     Ran Out of Food in the Last Year: Never true   Transportation Needs: No Transportation Needs (2023)    PRAPARE - Transportation     Lack of Transportation (Medical): No     Lack of Transportation (Non-Medical): No   Physical Activity: Inactive (2023)    Exercise Vital Sign     Days of Exercise  per Week: 0 days     Minutes of Exercise per Session: 0 min   Stress: No Stress Concern Present (12/11/2023)    [employer] - Occupational Stress Questionnaire     Feeling of Stress : Only a little   Housing Stability: Low Risk  (12/11/2023)    Housing Stability Vital Sign     Unable to Pay for Housing in the Last Year: No     Number of Places Lived in the Last Year: 1     Unstable Housing in the Last Year: No       Precautions:     Allergies as of 10/08/2024 - Reviewed 10/08/2024   Allergen Reaction Noted    Baclofen Itching and Anxiety 06/17/2019       WO Assessment Details/Treatment      10/09/24 1053   WOCN Assessment   Visit Date 10/09/24   Visit Time 1053   Consult Type New   [facility] Speciality Wound   Intervention chart review;assessed;applied;orders   Teaching on-going        Altered Skin Integrity 06/28/23 2230 Right lateral Ankle #6   Date First Assessed/Time First Assessed: 06/28/23 2230   Altered Skin Integrity Present on Admission - Did Patient arrive to the hospital with altered skin?: yes  Side: Right  Orientation: lateral  Location: Ankle  Wound Number: #6  Is this injury dev...   Wound Image    Description of Altered Skin Integrity Full thickness tissue loss. Subcutaneous fat may be visible but bone, tendon or muscle are not exposed   Dressing Appearance Intact;Moist drainage   Drainage Amount Scant   Drainage Characteristics/Odor Serosanguineous   Appearance Pink;Red;Yellow   Tissue loss description Full thickness   Black (%), Wound Tissue Color 0 %   Red (%), Wound Tissue Color 70 %   Yellow (%), Wound Tissue Color 30 %   Periwound Area Intact;Dry   Wound Edges Defined   Wound Length (cm) 2 cm   Wound Width (cm) 1.4 cm   Wound Depth (cm) 0.2 cm   Wound Volume (cm^3) 0.56 cm^3   Wound Surface Area (cm^2) 2.8 cm^2   Care Cleansed with:;Antimicrobial agent  (vashe)   Dressing Applied;Calcium alginate;Silver;Foam        Altered Skin Integrity 01/09/24 0848 upper Sacral spine    Date First Assessed/Time First Assessed: 01/09/24 0848   Altered Skin Integrity Present on Admission - Did Patient arrive to the hospital with altered skin?: suspected hospital acquired  Orientation: upper  Location: Sacral spine   Wound Image    Description of Altered Skin Integrity Full thickness tissue loss. Subcutaneous fat may be visible but bone, tendon or muscle are not exposed   Dressing Appearance Intact;Moist drainage   Drainage Amount Small   Drainage Characteristics/Odor Serosanguineous   Appearance Pink;Red;Moist   Tissue loss description Full thickness   Black (%), Wound Tissue Color 0 %   Red (%), Wound Tissue Color 100 %   Yellow (%), Wound Tissue Color 0 %   Periwound Area Intact;Dry   Wound Edges Defined   Wound Length (cm) 5.5 cm   Wound Width (cm) 5.5 cm   Wound Depth (cm) 2.3 cm   Wound Volume (cm^3) 69.575 cm^3   Wound Surface Area (cm^2) 30.25 cm^2   Care Cleansed with:;Antimicrobial agent  (vashe)   Dressing Applied;Gauze, wet to dry  (vashe wet to dry, secured w/ tape.)        Wound 05/30/24 0000 Pressure Injury Right Buttocks   Date First Assessed/Time First Assessed: 05/30/24 0000   Primary Wound Type: Pressure Injury  Side: Right  Location: Buttocks  Is this injury device related?: No   Wound Image    Pressure Injury Stage U   Dressing Appearance Moist drainage   Drainage Amount Small   Drainage Characteristics/Odor Serosanguineous;Creamy   Appearance Pink;Red;Yellow;Gray;Black   Tissue loss description Full thickness   Black (%), Wound Tissue Color 10 %   Red (%), Wound Tissue Color 40 %   Yellow (%), Wound Tissue Color 50 %   Periwound Area Intact;Dry   Wound Edges Defined   Wound Length (cm) 6 cm   Wound Width (cm) 4 cm   Wound Depth (cm) 3 cm   Wound Volume (cm^3) 72 cm^3   Wound Surface Area (cm^2) 24 cm^2   Care Cleansed with:;Antimicrobial agent  (vashe)   Dressing Applied;Other (comment)  (vashe moistened mesalt to yellow/gray area, vashe wet to dry, secured w/ tape.)         Wound 08/22/24 0800 Other (comment) Left Heel   Date First Assessed/Time First Assessed: 08/22/24 0800   Primary Wound Type: Other (comment)  Side: Left  Location: Heel   Wound Image    Dressing Appearance Intact;Dried drainage   Drainage Amount Small   Drainage Characteristics/Odor Serous   Appearance Pink;Red;Yellow   Tissue loss description Full thickness   Black (%), Wound Tissue Color 0 %   Red (%), Wound Tissue Color 80 %   Yellow (%), Wound Tissue Color 20 %   Periwound Area Intact;Dry;Pink   Wound Edges Defined;Irregular   Wound Length (cm) 3 cm   Wound Width (cm) 2.5 cm   Wound Depth (cm) 0.2 cm   Wound Volume (cm^3) 1.5 cm^3   Wound Surface Area (cm^2) 7.5 cm^2   Care Cleansed with:;Antimicrobial agent  (vashe)   Dressing Applied;Calcium alginate;Silver;Foam     WOCN consulted for sacrum. Discussed POC w/ nurse Merlene. No family at bedside. Will order pt. An MARY. Explained reason for visit. Treatment recommendations: Sacrum: clean w/ vashe, dry well, apply vashe moistened gauze, abd pad, secure w/ tape. BID/prn if soilage. Right buttock: clean w/ vashe, dry well, apply vashe moistened mesalt to the yellow/grey area, vashe moistened gauze to rest of wound, abd pad, secure w/ tape. BID/PRN if soilage. Left heel: clean w/ vashe, dry well, apply Ca+ ag cloth to wound bed, cover w/ small bordered foam. Daily/prn if soilage. Right ankle: clean w/ vashe, dry well, apply Ca+ Ag cloth to wound bed, cover w/ small bordered foam. Daily/Prn if soilage. Keep feet in prafo boots to help heels staying offloaded. Educated pt. On the importance on keeping areas clean and dry. Surgery is on pt.'s case to assess right buttock wound. Nursing to cont. Tx recs and preventative measures. Will follow up.     10/09/2024
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
## 2024-10-09 NOTE — PLAN OF CARE
10/09/24 1356   Discharge Assessment   Assessment Type Discharge Planning Assessment   Confirmed/corrected address, phone number and insurance Yes   Confirmed Demographics Correct on Facesheet   Source of Information patient   Communicated MINISTERIO with patient/caregiver Date not available/Unable to determine   Reason For Admission wound infection chest pain   People in Home spouse   Do you expect to return to your current living situation? Yes   Do you have help at home or someone to help you manage your care at home? Yes   Who are your caregiver(s) and their phone number(s)? spouse Katrina Khan [phone]   Prior to hospitilization cognitive status: Alert/Oriented   Current cognitive status: Alert/Oriented   Walking or Climbing Stairs Difficulty yes   Walking or Climbing Stairs ambulation difficulty, requires equipment;ambulation difficulty, dependent;stair climbing difficulty, dependent;transferring difficulty, assistance 1 person   Mobility Management motorized chair   Dressing/Bathing Difficulty yes   Dressing/Bathing bathing difficulty, requires equipment;dressing difficulty, assistance 1 person   Dressing/Bathing Management ADLS assistance required   Home Accessibility wheelchair accessible   Readmission within 30 days? No   Patient currently being followed by outpatient case management? No   Do you currently have service(s) that help you manage your care at home? Yes   Name and Contact number of agency [facility]   Is the pt/caregiver preference to resume services with current agency Yes   Do you take prescription medications? Yes   Do you have prescription coverage? Yes   Coverage Medicare   Do you have any problems affording any of your prescribed medications? No   Is the patient taking medications as prescribed? yes   Who is going to help you get home at discharge? spouse Katrina Khan [phone]   How do you get to doctors appointments? car, drives self;family or friend will provide   Are you on  dialysis? No   Do you take coumadin? No   Discharge Plan A Home Health   Discharge Plan B Long-term acute care facility (LTAC)   DME Needed Upon Discharge  none   Discharge Plan discussed with: Patient   Transition of Care Barriers None   OTHER   Name(s) of People in Home spouse Katrina Khan [phone]

## 2024-10-09 NOTE — ED PROVIDER NOTES
Encounter Date: 10/8/2024       History     Chief Complaint   Patient presents with    Wound Check     Arrives via [facility] for eval of right side buttock/sacral wound. Reports of necrosis and foul smell. Patient with hypotension with EMS.     Bianca Khan is a 59 y.o. male with a past medical history of pressure ulcers, sepsis, bacteremia who presents to the ED for worsening of his buttock and sacral wounds.  He reports increasing pain, drainage, increasing smell.  He reports fevers and chills at home.  Patient hypotensive on EMS arrival..         Review of patient's allergies indicates:   Allergen Reactions    Baclofen Itching and Anxiety     Past Medical History:   Diagnosis Date    Arthritis     Chronic ulcer of ankle 05/26/2022    ESBL (extended spectrum beta-lactamase) producing bacteria infection 08/30/2024    Frequent UTI 07/02/2019    Generalized anxiety disorder 05/26/2022    Neurogenic bladder 05/26/2022    Osteomyelitis 05/26/2022    Paraplegia     Presence of suprapubic catheter 05/26/2022    Pure hypercholesterolemia 05/26/2022    Retention of urine, unspecified 08/09/2019    Spinal cord injury at T1-T6 level 04/20/2018     Past Surgical History:   Procedure Laterality Date    APPLICATION OF WOUND VACUUM-ASSISTED CLOSURE DEVICE N/A 10/10/2024    Procedure: APPLICATION, WOUND VAC;  Surgeon: Rob Sinclair MD;  Location: [facility];  Service: General;  Laterality: N/A;    EGD, WITH CLOSED BIOPSY N/A 8/29/2024    Procedure: EGD, WITH CLOSED BIOPSY;  Surgeon: Mikal Segovia MD;  Location: [facility] ENDOSCOPY;  Service: Gastroenterology;  Laterality: N/A;    ESOPHAGOGASTRODUODENOSCOPY N/A 8/29/2024    Procedure: EGD;  Surgeon: Mikal Segovia MD;  Location: [facility] ENDOSCOPY;  Service: Gastroenterology;  Laterality: N/A;    FRACTURE SURGERY  2021    INCISION AND DRAINAGE, LOWER EXTREMITY Right 06/29/2023    Procedure: INCISION AND DRAINAGE, LOWER EXTREMITY;  Surgeon: Prabhu Shen DO;  Location: [facility];   Service: Orthopedics;  Laterality: Right;  supine bone foam wash stuff cultures    INCISION AND DRAINAGE, LOWER EXTREMITY Right 2023    Procedure: INCISION AND DRAINAGE, LOWER EXTREMITY;  Surgeon: Prabhu Shen DO;  Location: [facility] OR;  Service: Orthopedics;  Laterality: Right;  supine any table bone foam wash stuff possible wound vac    INCISION AND DRAINAGE, LOWER EXTREMITY Right 2023    Procedure: INCISION AND DRAINAGE, LOWER EXTREMITY;  Surgeon: Prabhu Shen DO;  Location: [facility] OR;  Service: Orthopedics;  Laterality: Right;  supine bone foam vascular bed removal of distal femoral plate, wash stuff, possible AKA    INSERTION OF INTRAMEDULLARY MARISA Right 08/10/2022    Procedure: INSERTION, INTRAMEDULLARY MARISA RIGHT TIBIA;  Surgeon: Jorge Orellana MD;  Location: [facility] OR;  Service: Orthopedics;  Laterality: Right;    JOINT REPLACEMENT      Ankel    PRESSURE ULCER DEBRIDEMENT N/A 10/10/2024    Procedure: DEBRIDEMENT, PRESSURE ULCER;  Surgeon: Rob Sinclair MD;  Location: [facility];  Service: General;  Laterality: N/A;  sacral wound, R buttock wound    REMOVAL OF HARDWARE FROM LOWER EXTREMITY Right 2023    Procedure: REMOVAL, HARDWARE, LOWER EXTREMITY;  Surgeon: Prabhu Shen DO;  Location: [facility] OR;  Service: Orthopedics;  Laterality: Right;    SPINE SURGERY  2018     Family History   Problem Relation Name Age of Onset    No Known Problems Mother      No Known Problems Father       Social History     Tobacco Use    Smoking status: Some Days     Current packs/day: 0.00     Average packs/day: 0.2 packs/day for 41.5 years (10.4 ttl pk-yrs)     Types: Cigars, Cigarettes     Start date: 1982     Last attempt to quit: 2023     Years since quittin.2    Smokeless tobacco: Never   Substance Use Topics    Alcohol use: Not Currently     Alcohol/week: 2.0 standard drinks of alcohol     Types: 2 Cans of beer per week    Drug use: Not Currently     Types: Oxycodone     Review of Systems    Constitutional:  Positive for chills and fever.   Skin:  Positive for wound.       Physical Exam     Initial Vitals [10/08/24 1719]   BP Pulse Resp Temp SpO2   (!) 89/60 89 16 96.8 °F (36 °C) 99 %      MAP       --         Physical Exam    Nursing note and vitals reviewed.  Constitutional:   Ill appearing   Eyes: EOM are normal. Pupils are equal, round, and reactive to light.   Cardiovascular:  Normal rate and regular rhythm.           Abdominal: Abdomen is soft.     Neurological: He is alert.   Skin:   Multiple wounds to sacrum and posterior leg, right buttock wound with exudate and exposed bone         ED Course   Critical Care    Date/Time: 10/8/2024 7:00 PM    Performed by: Luis Pelaez MD  Authorized by: Luis Pelaez MD  Direct patient critical care time: 35 minutes  Total critical care time (exclusive of procedural time) : 35 minutes  Critical care time was exclusive of separately billable procedures and treating other patients.  Critical care was necessary to treat or prevent imminent or life-threatening deterioration of the following conditions: sepsis.  Critical care was time spent personally by me on the following activities: discussions with consultants, development of treatment plan with patient or surrogate, evaluation of patient's response to treatment, examination of patient, obtaining history from patient or surrogate, ordering and performing treatments and interventions, ordering and review of laboratory studies, ordering and review of radiographic studies, pulse oximetry, re-evaluation of patient's condition and review of old charts.        Labs Reviewed   COMPREHENSIVE METABOLIC PANEL - Abnormal       Result Value    Sodium 137      Potassium 4.0      Chloride 107      CO2 19 (*)     Glucose 120 (*)     Blood Urea Nitrogen 21.4      Creatinine 1.51 (*)     Calcium 8.5 (*)     Protein Total 7.5      Albumin 2.4 (*)     Globulin 5.1 (*)     Albumin/Globulin Ratio 0.5 (*)      Bilirubin Total 0.3      ALP 80      ALT 10      AST 18      eGFR 53      Anion Gap 11.0      BUN/Creatinine Ratio 14     CBC WITH DIFFERENTIAL - Abnormal    WBC 9.54      RBC 3.42 (*)     Hgb 8.5 (*)     Hct 27.9 (*)     MCV 81.6      MCH 24.9 (*)     MCHC 30.5 (*)     RDW 17.9 (*)     Platelet 492 (*)     MPV 8.8      Neut % 59.9      Lymph % 28.3      Mono % 10.2      Eos % 0.8      Basophil % 0.3      Lymph # 2.70      Neut # 5.71      Mono # 0.97      Eos # 0.08      Baso # 0.03      IG# 0.05 (*)     IG% 0.5      NRBC% 0.0     SEDIMENTATION RATE - Abnormal    Sed Rate 98 (*)    C-REACTIVE PROTEIN - Abnormal    .80 (*)    LACTIC ACID, PLASMA - Normal    Lactic Acid Level 2.0     CBC W/ AUTO DIFFERENTIAL    Narrative:     The following orders were created for panel order CBC auto differential.  Procedure                               Abnormality         Status                     ---------                               -----------         ------                     CBC with Differential[[id number]]       Abnormal            Final result                 Please view results for these tests on the individual orders.          Imaging Results              CT Pelvis With IV Contrast NO Oral Contrast (Final result)  Result time 10/08/24 20:00:25      Final result by Konrad Robertson MD (10/08/24 20:00:25)                   Impression:      As above.  Worsening inflammatory change of the sacral decubitus ulcers with gas now identified inferior to the right pubic ramus.  Stool noted throughout the colon as may be seen with constipation.  Pyelonephritis is not excluded on the basis of this exam.      Electronically signed by: Konrad Robertson  Date:    10/08/2024  Time:    20:00               Narrative:    EXAMINATION:  CT PELVIS WITH IV CONTRAST    CLINICAL HISTORY:  worsening sacral wounds, increasing pain and drainage;    TECHNIQUE:  Axial noncontrast CT images of the pelvis were obtained with coronal and  sagittal reconstructions    Automatic dose control was utilized to reduce patient radiation dose.        COMPARISON:  06/01/2024    FINDINGS:  Stool noted throughout the colon as may be seen with constipation.  There is heterogeneity of the right kidney with partially imaged left kidney.  Pyelonephritis is not excluded.  Correlate with urinalysis.  There is now gas noted inferior to the right pubic ramus not previously identified.  Surrounding inflammatory changes noted.  There is no rim enhancing collection to suggest abscess.                                       Medications   tobramycin - pharmacy to dose (has no administration in time range)   meropenem (MERREM) 1 g in 0.9% NaCl 100 mL IVPB (MB+) (0 g Intravenous Stopped 10/12/24 0539)   fluconazole (DIFLUCAN) IVPB 200 mg (0 mg Intravenous Stopped 10/11/24 2239)   sodium chloride 0.9% flush 10 mL (has no administration in time range)   melatonin tablet 6 mg (has no administration in time range)   ondansetron injection 4 mg (has no administration in time range)   prochlorperazine injection Soln 5 mg (has no administration in time range)   polyethylene glycol packet 17 g (has no administration in time range)   acetaminophen tablet 650 mg (has no administration in time range)   aluminum-magnesium hydroxide-simethicone 200-200-20 mg/5 mL suspension 30 mL (has no administration in time range)   senna-docusate 8.6-50 mg per tablet 1 tablet (1 tablet Oral Given 10/11/24 1937)   insulin aspart U-100 injection 1-10 Units (has no administration in time range)   glucose chewable tablet 16 g (has no administration in time range)   glucose chewable tablet 24 g (has no administration in time range)   glucagon (human recombinant) injection 1 mg (has no administration in time range)   dextrose 10% bolus 125 mL 125 mL (has no administration in time range)   dextrose 10% bolus 250 mL 250 mL (has no administration in time range)   atorvastatin tablet 20 mg (20 mg Oral Given  10/11/24 1937)   FLUoxetine capsule 20 mg (20 mg Per G Tube Given 10/11/24 0827)   gabapentin capsule 400 mg (400 mg Oral Given 10/12/24 0440)   methocarbamoL tablet 750 mg (750 mg Oral Given 10/9/24 1925)   oxybutynin tablet 5 mg (5 mg Oral Given 10/11/24 1937)   pantoprazole EC tablet 40 mg (40 mg Oral Given 10/11/24 0827)   traZODone tablet 200 mg (200 mg Oral Given 10/11/24 2341)   fenofibrate tablet 48 mg (48 mg Oral Given 10/11/24 0900)   carvediloL tablet 25 mg (25 mg Oral Given 10/11/24 1937)   tobramycin (NEBCIN) 445 mg in D5W 100 mL IVPB (0 mg Intravenous Stopped 10/11/24 2039)   enoxaparin injection 70 mg (70 mg Subcutaneous Not Given 10/11/24 1745)   oxyCODONE-acetaminophen  mg per tablet 1 tablet (1 tablet Oral Given 10/12/24 0444)   doxycycline tablet 100 mg (100 mg Oral Given 10/11/24 1937)   diphenhydrAMINE injection 50 mg (50 mg Intravenous Given 10/11/24 1143)   0.9%  NaCl infusion (for blood administration) (has no administration in time range)   sodium chloride 0.9% bolus 1,905 mL 1,905 mL (0 mLs Intravenous Stopped 10/8/24 2010)   vancomycin 1,500 mg in D5W 250 mL IVPB (admixture device) (0 mg Intravenous Stopped 10/8/24 2010)   meropenem (MERREM) 1 g in 0.9% NaCl 100 mL IVPB (MB+) (0 g Intravenous Stopped 10/8/24 2128)   iohexoL (OMNIPAQUE 350) injection 100 mL (100 mLs Intravenous Given 10/8/24 1956)   tobramycin (NEBCIN) 445 mg in D5W 100 mL IVPB (0 mg Intravenous Stopped 10/8/24 2159)   methylnaltrexone 12 mg/0.6 mL subcutaneous injection 12 mg (12 mg Subcutaneous Given 10/10/24 0822)     Medical Decision Making  Problems Addressed:  Sacral wound: acute illness or injury    Amount and/or Complexity of Data Reviewed  Labs: ordered.  Radiology: ordered.    Risk  Prescription drug management.  Decision regarding hospitalization.      Differential diagnosis (includes but is not limited to):   Worsening skin wounds, deep-seated infection, abscess, sepsis    MDM Narrative  59-year-old male  "presents for evaluation of worsening wounds to his buttocks and sacral areas.  He was reported subjective fevers at home.  He reports increasing foul smell and increasing drainage from has been as well.  Patient hypotensive on arrival.  Aggressive IV fluid resuscitation ordered of 30 cc/kilos.  Sepsis workup initiated including blood cultures and lactic acid, initial lactic acid is normal.  Labs reviewed, inflammatory markers elevated.  Broad-spectrum antibiotics based on previous culture results initiated in discussion with the ER pharmacist.  Pain and nausea control as needed.  CT of the pelvis with contrast performed.  Discussed with hospitalist, will admit.    Dispo: Admit    My independent radiology interpretation: as above  Point of care US (independently performed and interpreted):   Decision rules/clinical scoring:     Sepsis Perfusion Assessment: "I attest a sepsis perfusion exam was performed within 6 hours of sepsis, severe sepsis, or septic shock presentation, following fluid resuscitation."     Amount and/or Complexity of Data Reviewed  Independent historian: none   Summary of history:   External data reviewed: notes from previous ED visits and notes from clinic visits  Summary of data reviewed: Prior records reviewed  Risk and benefits of testing: discussed   Labs: ordered and reviewed  Radiology: ordered and independent interpretation performed (see above or ED course)  ECG/medicine tests: ordered and independent interpretation performed (see above or ED course)  Discussion of management or test interpretation with external provider(s): discussed with hospitalist physician   Summary of discussion: as above    Risk  Parenteral controlled substances   Drug therapy requiring intense monitoring for toxicity   Decision regarding hospitalization  Shared decision making     Critical Care  30-74 minutes     Data Reviewed/Counseling: I have personally reviewed the patient's vital signs, nursing notes, and " other relevant tests, information, and imaging. I had a detailed discussion regarding the historical points, exam findings, and any diagnostic results supporting the discharge diagnosis. I personally performed the history, PE, MDM and procedures as documented above and agree with the scribe's documentation.    Portions of this note were dictated using voice recognition software. Although it was reviewed for accuracy, some inherent voice recognition errors may have occurred and may be present in this document.             ED Course as of 10/12/24 0657   Tue Oct 08, 2024   2041 Consult hospitalist [BS]      ED Course User Index  [BS] Lorena Velasco MD                           Clinical Impression:  Final diagnoses:  [S31.000A] Sacral wound          ED Disposition Condition    Admit Stable                Luis Pelaez MD  10/12/24 0657

## 2024-10-10 ENCOUNTER — ANESTHESIA EVENT (OUTPATIENT)
Dept: SURGERY | Facility: HOSPITAL | Age: 59
End: 2024-10-10
Payer: MEDICARE

## 2024-10-10 ENCOUNTER — ANESTHESIA (OUTPATIENT)
Dept: SURGERY | Facility: HOSPITAL | Age: 59
End: 2024-10-10
Payer: MEDICARE

## 2024-10-10 LAB
ALBUMIN SERPL-MCNC: 2.4 G/DL (ref 3.4–4.8)
ALBUMIN/GLOB SERPL: 0.5 RATIO (ref 1.1–2)
ALP SERPL-CCNC: 88 UNIT/L (ref 40–150)
ALT SERPL-CCNC: 9 UNIT/L (ref 0–55)
ANION GAP SERPL CALC-SCNC: 9 MEQ/L
AST SERPL-CCNC: 17 UNIT/L (ref 5–34)
BASOPHILS # BLD AUTO: 0.01 X10(3)/MCL
BASOPHILS NFR BLD AUTO: 0.1 %
BILIRUB SERPL-MCNC: 0.2 MG/DL
BUN SERPL-MCNC: 9.2 MG/DL (ref 8.4–25.7)
CALCIUM SERPL-MCNC: 8.2 MG/DL (ref 8.8–10)
CHLORIDE SERPL-SCNC: 107 MMOL/L (ref 98–107)
CO2 SERPL-SCNC: 24 MMOL/L (ref 23–31)
CREAT SERPL-MCNC: 0.88 MG/DL (ref 0.73–1.18)
CREAT/UREA NIT SERPL: 10
EOSINOPHIL # BLD AUTO: 0.01 X10(3)/MCL (ref 0–0.9)
EOSINOPHIL NFR BLD AUTO: 0.1 %
ERYTHROCYTE [DISTWIDTH] IN BLOOD BY AUTOMATED COUNT: 17.8 % (ref 11.5–17)
GFR SERPLBLD CREATININE-BSD FMLA CKD-EPI: >60 ML/MIN/1.73/M2
GLOBULIN SER-MCNC: 4.5 GM/DL (ref 2.4–3.5)
GLUCOSE SERPL-MCNC: 122 MG/DL (ref 70–110)
GLUCOSE SERPL-MCNC: 128 MG/DL (ref 82–115)
HCT VFR BLD AUTO: 27.2 % (ref 42–52)
HGB BLD-MCNC: 8.4 G/DL (ref 14–18)
IMM GRANULOCYTES # BLD AUTO: 0.02 X10(3)/MCL (ref 0–0.04)
IMM GRANULOCYTES NFR BLD AUTO: 0.2 %
LYMPHOCYTES # BLD AUTO: 1.64 X10(3)/MCL (ref 0.6–4.6)
LYMPHOCYTES NFR BLD AUTO: 17.1 %
MCH RBC QN AUTO: 24.1 PG (ref 27–31)
MCHC RBC AUTO-ENTMCNC: 30.9 G/DL (ref 33–36)
MCV RBC AUTO: 77.9 FL (ref 80–94)
MONOCYTES # BLD AUTO: 0.18 X10(3)/MCL (ref 0.1–1.3)
MONOCYTES NFR BLD AUTO: 1.9 %
NEUTROPHILS # BLD AUTO: 7.73 X10(3)/MCL (ref 2.1–9.2)
NEUTROPHILS NFR BLD AUTO: 80.6 %
NRBC BLD AUTO-RTO: 0 %
PLATELET # BLD AUTO: 509 X10(3)/MCL (ref 130–400)
PMV BLD AUTO: 8.4 FL (ref 7.4–10.4)
POCT GLUCOSE: 117 MG/DL (ref 70–110)
POCT GLUCOSE: 214 MG/DL (ref 70–110)
POTASSIUM SERPL-SCNC: 4.5 MMOL/L (ref 3.5–5.1)
PROT SERPL-MCNC: 6.9 GM/DL (ref 5.8–7.6)
RBC # BLD AUTO: 3.49 X10(6)/MCL (ref 4.7–6.1)
SODIUM SERPL-SCNC: 140 MMOL/L (ref 136–145)
WBC # BLD AUTO: 9.59 X10(3)/MCL (ref 4.5–11.5)

## 2024-10-10 PROCEDURE — 36000707: Performed by: STUDENT IN AN ORGANIZED HEALTH CARE EDUCATION/TRAINING PROGRAM

## 2024-10-10 PROCEDURE — 85025 COMPLETE CBC W/AUTO DIFF WBC: CPT | Performed by: INTERNAL MEDICINE

## 2024-10-10 PROCEDURE — 80053 COMPREHEN METABOLIC PANEL: CPT | Performed by: INTERNAL MEDICINE

## 2024-10-10 PROCEDURE — 63600175 PHARM REV CODE 636 W HCPCS: Performed by: INTERNAL MEDICINE

## 2024-10-10 PROCEDURE — 71000033 HC RECOVERY, INTIAL HOUR: Performed by: STUDENT IN AN ORGANIZED HEALTH CARE EDUCATION/TRAINING PROGRAM

## 2024-10-10 PROCEDURE — 0KBN0ZZ EXCISION OF RIGHT HIP MUSCLE, OPEN APPROACH: ICD-10-PCS | Performed by: STUDENT IN AN ORGANIZED HEALTH CARE EDUCATION/TRAINING PROGRAM

## 2024-10-10 PROCEDURE — 37000008 HC ANESTHESIA 1ST 15 MINUTES: Performed by: STUDENT IN AN ORGANIZED HEALTH CARE EDUCATION/TRAINING PROGRAM

## 2024-10-10 PROCEDURE — 25000003 PHARM REV CODE 250: Performed by: NURSE PRACTITIONER

## 2024-10-10 PROCEDURE — 36000704 HC OR TIME LEV I 1ST 15 MIN: Performed by: STUDENT IN AN ORGANIZED HEALTH CARE EDUCATION/TRAINING PROGRAM

## 2024-10-10 PROCEDURE — 63600175 PHARM REV CODE 636 W HCPCS: Performed by: NURSE ANESTHETIST, CERTIFIED REGISTERED

## 2024-10-10 PROCEDURE — 25000003 PHARM REV CODE 250: Performed by: NURSE ANESTHETIST, CERTIFIED REGISTERED

## 2024-10-10 PROCEDURE — 11043 DBRDMT MUSC&/FSCA 1ST 20/<: CPT | Mod: ,,, | Performed by: STUDENT IN AN ORGANIZED HEALTH CARE EDUCATION/TRAINING PROGRAM

## 2024-10-10 PROCEDURE — 11000001 HC ACUTE MED/SURG PRIVATE ROOM

## 2024-10-10 PROCEDURE — 36000705 HC OR TIME LEV I EA ADD 15 MIN: Performed by: STUDENT IN AN ORGANIZED HEALTH CARE EDUCATION/TRAINING PROGRAM

## 2024-10-10 PROCEDURE — 37000009 HC ANESTHESIA EA ADD 15 MINS: Performed by: STUDENT IN AN ORGANIZED HEALTH CARE EDUCATION/TRAINING PROGRAM

## 2024-10-10 PROCEDURE — 27201423 OPTIME MED/SURG SUP & DEVICES STERILE SUPPLY: Performed by: STUDENT IN AN ORGANIZED HEALTH CARE EDUCATION/TRAINING PROGRAM

## 2024-10-10 PROCEDURE — 99231 SBSQ HOSP IP/OBS SF/LOW 25: CPT | Mod: 25,GC,, | Performed by: STUDENT IN AN ORGANIZED HEALTH CARE EDUCATION/TRAINING PROGRAM

## 2024-10-10 PROCEDURE — 25000003 PHARM REV CODE 250: Performed by: INTERNAL MEDICINE

## 2024-10-10 PROCEDURE — 36000706: Performed by: STUDENT IN AN ORGANIZED HEALTH CARE EDUCATION/TRAINING PROGRAM

## 2024-10-10 PROCEDURE — 36415 COLL VENOUS BLD VENIPUNCTURE: CPT | Performed by: INTERNAL MEDICINE

## 2024-10-10 RX ORDER — MIDAZOLAM HYDROCHLORIDE 1 MG/ML
INJECTION INTRAMUSCULAR; INTRAVENOUS
Status: DISCONTINUED | OUTPATIENT
Start: 2024-10-10 | End: 2024-10-10

## 2024-10-10 RX ORDER — DEXAMETHASONE SODIUM PHOSPHATE 4 MG/ML
INJECTION, SOLUTION INTRA-ARTICULAR; INTRALESIONAL; INTRAMUSCULAR; INTRAVENOUS; SOFT TISSUE
Status: DISCONTINUED | OUTPATIENT
Start: 2024-10-10 | End: 2024-10-10

## 2024-10-10 RX ORDER — HYDROMORPHONE HYDROCHLORIDE 2 MG/ML
0.4 INJECTION, SOLUTION INTRAMUSCULAR; INTRAVENOUS; SUBCUTANEOUS EVERY 5 MIN PRN
Status: CANCELLED | OUTPATIENT
Start: 2024-10-10

## 2024-10-10 RX ORDER — FENTANYL CITRATE 50 UG/ML
INJECTION, SOLUTION INTRAMUSCULAR; INTRAVENOUS
Status: DISCONTINUED | OUTPATIENT
Start: 2024-10-10 | End: 2024-10-10

## 2024-10-10 RX ORDER — ROCURONIUM BROMIDE 10 MG/ML
INJECTION, SOLUTION INTRAVENOUS
Status: DISCONTINUED | OUTPATIENT
Start: 2024-10-10 | End: 2024-10-10

## 2024-10-10 RX ORDER — LIDOCAINE HYDROCHLORIDE 20 MG/ML
INJECTION INTRAVENOUS
Status: DISCONTINUED | OUTPATIENT
Start: 2024-10-10 | End: 2024-10-10

## 2024-10-10 RX ORDER — ONDANSETRON HYDROCHLORIDE 2 MG/ML
INJECTION, SOLUTION INTRAVENOUS
Status: DISCONTINUED | OUTPATIENT
Start: 2024-10-10 | End: 2024-10-10

## 2024-10-10 RX ORDER — PROPOFOL 10 MG/ML
VIAL (ML) INTRAVENOUS
Status: DISCONTINUED | OUTPATIENT
Start: 2024-10-10 | End: 2024-10-10

## 2024-10-10 RX ORDER — SODIUM CHLORIDE 0.9 % (FLUSH) 0.9 %
10 SYRINGE (ML) INJECTION
Status: CANCELLED | OUTPATIENT
Start: 2024-10-10

## 2024-10-10 RX ORDER — DOXYCYCLINE HYCLATE 100 MG
100 TABLET ORAL EVERY 12 HOURS
Status: DISCONTINUED | OUTPATIENT
Start: 2024-10-10 | End: 2024-10-17

## 2024-10-10 RX ORDER — PHENYLEPHRINE HYDROCHLORIDE 10 MG/ML
INJECTION INTRAVENOUS
Status: DISCONTINUED | OUTPATIENT
Start: 2024-10-10 | End: 2024-10-10

## 2024-10-10 RX ORDER — GLUCAGON 1 MG
1 KIT INJECTION
Status: CANCELLED | OUTPATIENT
Start: 2024-10-10

## 2024-10-10 RX ADMIN — ROCURONIUM BROMIDE 50 MG: 10 SOLUTION INTRAVENOUS at 09:10

## 2024-10-10 RX ADMIN — PHENYLEPHRINE HYDROCHLORIDE 100 MCG: 10 INJECTION INTRAVENOUS at 09:10

## 2024-10-10 RX ADMIN — TOBRAMYCIN SULFATE 445 MG: 40 INJECTION, SOLUTION INTRAMUSCULAR; INTRAVENOUS at 09:10

## 2024-10-10 RX ADMIN — LIDOCAINE HYDROCHLORIDE 80 MG: 20 INJECTION INTRAVENOUS at 09:10

## 2024-10-10 RX ADMIN — MEROPENEM 1 G: 1 INJECTION, POWDER, FOR SOLUTION INTRAVENOUS at 04:10

## 2024-10-10 RX ADMIN — SUGAMMADEX 200 MG: 100 INJECTION, SOLUTION INTRAVENOUS at 10:10

## 2024-10-10 RX ADMIN — FLUOXETINE HYDROCHLORIDE 20 MG: 20 CAPSULE ORAL at 08:10

## 2024-10-10 RX ADMIN — DEXAMETHASONE SODIUM PHOSPHATE 4 MG: 4 INJECTION, SOLUTION INTRA-ARTICULAR; INTRALESIONAL; INTRAMUSCULAR; INTRAVENOUS; SOFT TISSUE at 10:10

## 2024-10-10 RX ADMIN — PANTOPRAZOLE SODIUM 40 MG: 40 TABLET, DELAYED RELEASE ORAL at 08:10

## 2024-10-10 RX ADMIN — FENTANYL CITRATE 100 MCG: 50 INJECTION, SOLUTION INTRAMUSCULAR; INTRAVENOUS at 09:10

## 2024-10-10 RX ADMIN — OXYBUTYNIN CHLORIDE 5 MG: 5 TABLET ORAL at 08:10

## 2024-10-10 RX ADMIN — GABAPENTIN 400 MG: 400 CAPSULE ORAL at 09:10

## 2024-10-10 RX ADMIN — FLUCONAZOLE 200 MG: 2 INJECTION, SOLUTION INTRAVENOUS at 10:10

## 2024-10-10 RX ADMIN — METHYLNALTREXONE BROMIDE 12 MG: 12 INJECTION, SOLUTION SUBCUTANEOUS at 08:10

## 2024-10-10 RX ADMIN — SENNOSIDES AND DOCUSATE SODIUM 1 TABLET: 50; 8.6 TABLET ORAL at 08:10

## 2024-10-10 RX ADMIN — DOXYCYCLINE HYCLATE 100 MG: 100 TABLET, COATED ORAL at 08:10

## 2024-10-10 RX ADMIN — FENOFIBRATE 48 MG: 48 TABLET, FILM COATED ORAL at 08:10

## 2024-10-10 RX ADMIN — ONDANSETRON 4 MG: 2 INJECTION INTRAMUSCULAR; INTRAVENOUS at 10:10

## 2024-10-10 RX ADMIN — ENOXAPARIN SODIUM 70 MG: 80 INJECTION SUBCUTANEOUS at 04:10

## 2024-10-10 RX ADMIN — GABAPENTIN 400 MG: 400 CAPSULE ORAL at 12:10

## 2024-10-10 RX ADMIN — ENOXAPARIN SODIUM 70 MG: 80 INJECTION SUBCUTANEOUS at 05:10

## 2024-10-10 RX ADMIN — MEROPENEM 1 G: 1 INJECTION, POWDER, FOR SOLUTION INTRAVENOUS at 08:10

## 2024-10-10 RX ADMIN — MIDAZOLAM HYDROCHLORIDE 2 MG: 1 INJECTION, SOLUTION INTRAMUSCULAR; INTRAVENOUS at 09:10

## 2024-10-10 RX ADMIN — PHENYLEPHRINE HYDROCHLORIDE 100 MCG: 10 INJECTION INTRAVENOUS at 10:10

## 2024-10-10 RX ADMIN — OXYCODONE AND ACETAMINOPHEN 1 TABLET: 10; 325 TABLET ORAL at 04:10

## 2024-10-10 RX ADMIN — CARVEDILOL 25 MG: 12.5 TABLET, FILM COATED ORAL at 08:10

## 2024-10-10 RX ADMIN — PROPOFOL 200 MG: 10 INJECTION, EMULSION INTRAVENOUS at 09:10

## 2024-10-10 RX ADMIN — MEROPENEM 1 G: 1 INJECTION, POWDER, FOR SOLUTION INTRAVENOUS at 12:10

## 2024-10-10 RX ADMIN — OXYCODONE AND ACETAMINOPHEN 1 TABLET: 10; 325 TABLET ORAL at 01:10

## 2024-10-10 RX ADMIN — CARVEDILOL 25 MG: 12.5 TABLET, FILM COATED ORAL at 05:10

## 2024-10-10 RX ADMIN — ATORVASTATIN CALCIUM 20 MG: 10 TABLET, FILM COATED ORAL at 08:10

## 2024-10-10 RX ADMIN — SODIUM CHLORIDE, SODIUM GLUCONATE, SODIUM ACETATE, POTASSIUM CHLORIDE AND MAGNESIUM CHLORIDE: 526; 502; 368; 37; 30 INJECTION, SOLUTION INTRAVENOUS at 09:10

## 2024-10-10 RX ADMIN — OXYCODONE AND ACETAMINOPHEN 1 TABLET: 10; 325 TABLET ORAL at 08:10

## 2024-10-10 NOTE — ANESTHESIA PREPROCEDURE EVALUATION
10/10/2024  Bianca Khan is a 59 y.o., male.  Procedure Information    Case: [id number] Date/Time: 10/10/24 0852   Procedure: *DEBRIDEMENT* INACTIVE - sacral wound, R buttock wound   Anesthesia type: Choice   Diagnosis: Sacral wound [S31.000A]   Pre-op diagnosis: Sacral wound [S31.000A]   [facility]: [facility]  / [facility]   Surgeons: Rob Sinclair MD       Pre-op Assessment    I have reviewed the Patient Summary Reports.     I have reviewed the Nursing Notes. I have reviewed the NPO Status.   I have reviewed the Medications.     Review of Systems  Anesthesia Hx:  No problems with previous Anesthesia                Hematology/Oncology:  Hematology Normal   Oncology Normal                                   EENT/Dental:  EENT/Dental Normal           Cardiovascular:  Exercise tolerance: good   Hypertension                Functional Capacity good / => 4 METS                         Pulmonary:  Pulmonary Normal                       Renal/:   Denies Chronic Renal Disease.                Hepatic/GI:     GERD             Musculoskeletal:  Musculoskeletal Normal                Neurological:  Neurology Normal                                      Endocrine:  Diabetes         Denies Morbid Obesity / BMI > 40  Dermatological:  Skin Normal    Psych:   anxiety                 Physical Exam  General: Alert, Oriented, Well nourished and Cooperative    Airway:  Mallampati: II   Mouth Opening: Normal  TM Distance: Normal  Tongue: Normal  Neck ROM: Normal ROM    Dental:  Intact    Chest/Lungs:  Clear to auscultation, Normal Respiratory Rate    Heart:  Rate: Normal  Rhythm: Regular Rhythm       Latest Reference Range & Units 10/09/24 05:21   WBC 4.50 - 11.50 x10(3)/mcL 7.66   RBC 4.70 - 6.10 x10(6)/mcL 3.35 (L)   Hemoglobin 14.0 - 18.0 g/dL 8.2 (L)   Hematocrit 42.0 - 52.0 % 26.7 (L)   MCV 80.0 - 94.0 fL 79.7 (L)   MCH 27.0 - 31.0  pg 24.5 (L)   MCHC 33.0 - 36.0 g/dL 30.7 (L)   RDW 11.5 - 17.0 % 18.2 (H)   Platelet Count 130 - 400 x10(3)/mcL 529 (H)   MPV 7.4 - 10.4 fL 8.8   Neut % % 58.0   LYMPH % % 29.8   Mono % % 10.8   Eos % % 1.0   Basophil % % 0.1   Immature Granulocytes % 0.3   Neut # 2.1 - 9.2 x10(3)/mcL 4.44   Lymph # 0.6 - 4.6 x10(3)/mcL 2.28   Mono # 0.1 - 1.3 x10(3)/mcL 0.83   Eos # 0 - 0.9 x10(3)/mcL 0.08   Baso # <=0.2 x10(3)/mcL 0.01   Immature Grans (Abs) 0 - 0.04 x10(3)/mcL 0.02   nRBC % 0.0   Sodium 136 - 145 mmol/L 142   Potassium 3.5 - 5.1 mmol/L 4.3   Chloride 98 - 107 mmol/L 110 (H)   CO2 23 - 31 mmol/L 21 (L)   Anion Gap mEq/L 11.0   BUN 8.4 - 25.7 mg/dL 16.4   Creatinine 0.73 - 1.18 mg/dL 1.12   BUN/CREAT RATIO  15   eGFR mL/min/1.73/m2 >60   Glucose 82 - 115 mg/dL 112   Calcium 8.8 - 10.0 mg/dL 8.5 (L)   Phosphorus Level 2.3 - 4.7 mg/dL 4.0   Magnesium  1.60 - 2.60 mg/dL 1.70   ALP 40 - 150 unit/L 88   PROTEIN TOTAL 5.8 - 7.6 gm/dL 7.1   Albumin 3.4 - 4.8 g/dL 2.5 (L)   Albumin/Globulin Ratio 1.1 - 2.0 ratio 0.5 (L)   BILIRUBIN TOTAL <=1.5 mg/dL 0.3   AST 5 - 34 unit/L 14   ALT 0 - 55 unit/L 10   Globulin, Total 2.4 - 3.5 gm/dL 4.6 (H)   Hemoglobin A1C External <=7.0 % 6.8   Estimated Avg Glucose mg/dL 148.5   Tobramycin, Trough 0.3 - 2.0 ug/ml 4.7 (H)   BLOOD CULTURE [facility]  Rpt (IP)   (L): Data is abnormally low  (H): Data is abnormally high  (IP): In Process  Rpt: View report in Results Review for more information  Left Ventricle: The left ventricle is normal in size. Normal wall thickness. There is normal systolic function with a visually estimated ejection fraction of 60 - 65%. Grade I diastolic dysfunction.    Right Ventricle: Normal right ventricular cavity size. Systolic function is normal.    Aortic Valve: The aortic valve is structurally normal. Aortic valve peak velocity is 1.12 m/s. Mean gradient is 3 mmHg.    Mitral Valve: The mitral valve is structurally normal. There is mild regurgitation.    Tricuspid  Valve: There is moderate regurgitation. There is pulmonary hypertension. The estimated PA systolic pressure is at least 70 mmHg.    Pulmonic Valve: There is mild regurgitation.    Pulmonary Artery: There is severe pulmonary hypertension. The estimated pulmonary artery systolic pressure is 78 mmHg.    IVC/SVC: Intermediate venous pressure at 8 mmHg.  Test Reason : R00.0,    Vent. Rate : 090 BPM     Atrial Rate : 090 BPM     P-R Int : 132 ms          QRS Dur : 084 ms      QT Int : 356 ms       P-R-T Axes : 065 050 044 degrees     QTc Int : 435 ms    Normal sinus rhythm  Normal ECG  When compared with ECG of 30-MAY-2024 13:31,  No significant change was found  Confirmed by Slim Conley MD ([id number]) on 8/20/2024 3:41:31 PM    Referred By: [email or web address]   SELF           Confirmed By:Slim Conley MD              Anesthesia Plan  Type of Anesthesia, risks & benefits discussed:    Anesthesia Type: Gen ETT  Intra-op Monitoring Plan: Standard ASA Monitors  Post Op Pain Control Plan: multimodal analgesia  Induction:  IV and Inhalation  Airway Plan: Direct  Informed Consent: Informed consent signed with the Patient and all parties understand the risks and agree with anesthesia plan.  All questions answered. Patient consented to blood products? Yes  ASA Score: 3  Day of Surgery Review of History & Physical: H&P Update referred to the surgeon/provider.    Ready For Surgery From Anesthesia Perspective.     .

## 2024-10-10 NOTE — PLAN OF CARE
Problem: Adult Inpatient Plan of Care  Goal: Plan of Care Review  Outcome: Progressing  Goal: Patient-Specific Goal (Individualized)  Outcome: Progressing  Goal: Absence of Hospital-Acquired Illness or Injury  Outcome: Progressing  Goal: Optimal Comfort and Wellbeing  Outcome: Progressing  Goal: Readiness for Transition of Care  Outcome: Progressing     Problem: Diabetes Comorbidity  Goal: Blood Glucose Level Within Targeted Range  Outcome: Progressing     Problem: Infection  Goal: Absence of Infection Signs and Symptoms  Outcome: Progressing     Problem: Wound  Goal: Optimal Coping  Outcome: Progressing  Goal: Optimal Functional Ability  Outcome: Progressing  Goal: Absence of Infection Signs and Symptoms  Outcome: Progressing  Goal: Improved Oral Intake  Outcome: Progressing  Goal: Optimal Pain Control and Function  Outcome: Progressing  Goal: Skin Health and Integrity  Outcome: Progressing  Goal: Optimal Wound Healing  Outcome: Progressing     Problem: Skin Injury Risk Increased  Goal: Skin Health and Integrity  Outcome: Progressing     Problem: Pain Acute  Goal: Optimal Pain Control and Function  Outcome: Progressing
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
## 2024-10-10 NOTE — PROGRESS NOTES
Acute Care Surgery   Progress Note  Admit Date: 10/8/2024  HD#2  POD#Day of Surgery    Subjective:   Interval history:  No acute events overnight  Afebrile hemodynamically stable  Pain well controlled  NPO for procedure    Home Meds:  Current Outpatient Medications   Medication Instructions    amLODIPine (NORVASC) 10 mg, Oral, Daily    atorvastatin (LIPITOR) 20 mg, Oral, Nightly    carvediloL (COREG) 25 mg, Oral, 2 times daily with meals    celecoxib (CELEBREX) 200 mg, Daily    cloNIDine (CATAPRES) 0.1 mg, 3 times daily    collagenase (SANTYL) ointment Topical (Top), Twice weekly    doxycycline (VIBRA-TABS) 100 mg, Oral, Every 12 hours    famotidine (PEPCID) 20 mg, 2 times daily    fenofibrate (TRICOR) 48 mg, Daily    ferrous gluconate 324 mg, Oral, 2 times daily with meals    FLUoxetine 20 mg, Per G Tube, Daily    furosemide (LASIX) 20 mg, Daily    gabapentin (NEURONTIN) 400 mg, Every 8 hours    hydrALAZINE (APRESOLINE) 100 mg, 3 times daily    lisinopriL (PRINIVIL,ZESTRIL) 20 mg, Daily    methocarbamoL (ROBAXIN) 750 mg, 2 times daily    oxybutynin (DITROPAN) 5 mg, Oral, 2 times daily    oxyCODONE-acetaminophen (PERCOCET)  mg per tablet 1 tablet, Oral, Every 6 hours PRN    pantoprazole (PROTONIX) 40 mg, 2 times daily    traZODone (DESYREL) 200 mg, Oral, Nightly PRN    XARELTO 20 mg, Daily      Scheduled Meds:   atorvastatin  20 mg Oral Nightly    carvediloL  25 mg Oral BID    enoxparin  1 mg/kg Subcutaneous Q12H (treatment, non-standard time)    fenofibrate  48 mg Oral Daily    fluconazole (DIFLUCAN) IV (PEDS and ADULTS)  200 mg Intravenous Q24H    FLUoxetine  20 mg Per G Tube Daily    gabapentin  400 mg Oral Q8H    meropenem IV (PEDS and ADULTS)  1 g Intravenous Q8H    methylnaltrexone  12 mg Subcutaneous Daily    oxybutynin  5 mg Oral BID    pantoprazole  40 mg Oral Daily    senna-docusate 8.6-50 mg  1 tablet Oral BID    tobramycin IV (PEDS and ADULTS)  7 mg/kg (Adjusted) Intravenous Q24H     Continuous  "Infusions:  PRN Meds:  Current Facility-Administered Medications:     acetaminophen, 650 mg, Oral, Q4H PRN    aluminum-magnesium hydroxide-simethicone, 30 mL, Oral, QID PRN    dextrose 10%, 12.5 g, Intravenous, PRN    dextrose 10%, 25 g, Intravenous, PRN    glucagon (human recombinant), 1 mg, Intramuscular, PRN    glucose, 16 g, Oral, PRN    glucose, 24 g, Oral, PRN    insulin aspart U-100, 1-10 Units, Subcutaneous, QID (AC + HS) PRN    melatonin, 6 mg, Oral, Nightly PRN    methocarbamoL, 750 mg, Oral, TID PRN    ondansetron, 4 mg, Intravenous, Q4H PRN    oxyCODONE-acetaminophen, 1 tablet, Oral, Q4H PRN    polyethylene glycol, 17 g, Oral, BID PRN    prochlorperazine, 5 mg, Intravenous, Q6H PRN    sodium chloride 0.9%, 10 mL, Intravenous, PRN    tobramycin - pharmacy to dose, , Intravenous, pharmacy to manage frequency    traZODone, 200 mg, Oral, Nightly PRN     Objective:     VITAL SIGNS: 24 HR MIN & MAX LAST   Temp  Min: 97.9 °F (36.6 °C)  Max: 98.3 °F (36.8 °C)  97.9 °F (36.6 °C)   BP  Min: 112/85  Max: 159/84  (!) 146/80    Pulse  Min: 79  Max: 98  89    Resp  Min: 18  Max: 40  20    SpO2  Min: 94 %  Max: 98 %  95 %      HT: 5' 10" (177.8 cm)  WT: 73 kg (160 lb 15 oz)  BMI: 23.1     Intake/output:  Intake/Output - Last 3 Shifts         10/08 0700  10/09 0659 10/09 0700  10/10 0659    IV Piggyback 2255     Total Intake(mL/kg) 2255 (30.9)     Urine (mL/kg/hr) 1100 1850 (1.1)    Total Output 1100 1850    Net +1155 -1850                  Intake/Output Summary (Last 24 hours) at 10/10/2024 0647  Last data filed at 10/10/2024 0623  Gross per 24 hour   Intake --   Output 1850 ml   Net -1850 ml           Lines/drains/airway:       Peripheral IV - Single Lumen 10/08/24 1753 18 G Anterior;Left;Proximal Forearm (Active)   Site Assessment Clean;Dry;Intact 10/10/24 0400   Extremity Assessment Distal to IV No abnormal discoloration;No redness;No swelling;No warmth 10/09/24 1940   Line Status Capped;Saline locked 10/10/24 0400 " "  Dressing Status Clean;Dry;Intact 10/10/24 0400   Dressing Intervention Integrity maintained 10/10/24 0400   Number of days: 1            Midline Catheter - Single Lumen 08/28/24 0251 Left brachial vein (Active)   Number of days: 43            Suprapubic Catheter 10/04/24 0800 (Active)   Dressing no dressing 10/09/24 1940   Urine Color Yellow 10/09/24 1600   Collection Container Standard drainage bag 10/09/24 1940   Securement secured to upper leg w/ adhesive device 10/09/24 1940   Site Assessment Clean;Intact 10/09/24 1940   Catheter Care Performed yes 10/09/24 1940   Number of days: 5       Physical examination:  Gen: NAD, AAOx3, answering questions appropriately  HEENT: KEESHA  CV: RR  Resp: NWOB  Abd: S/NT/ND   Skin: sacral wound with necrotic tissue    Labs:  Renal:  Recent Labs     10/08/24  1817 10/09/24  0521   BUN 21.4 16.4   CREATININE 1.51* 1.12     No results for input(s): "LACTIC" in the last 72 hours.  FENGI:  Recent Labs     10/08/24  1817 10/09/24  0521    142   K 4.0 4.3    110*   CO2 19* 21*   CALCIUM 8.5* 8.5*   MG  --  1.70   PHOS  --  4.0   ALBUMIN 2.4* 2.5*   BILITOT 0.3 0.3   AST 18 14   ALKPHOS 80 88   ALT 10 10     Heme:  Recent Labs     10/08/24  1817 10/09/24  0521   HGB 8.5* 8.2*   HCT 27.9* 26.7*   * 529*     ID:  Recent Labs     10/08/24  1817 10/09/24  0521   WBC 9.54 7.66     CBG:  Recent Labs     10/08/24  1817 10/09/24  0521   GLUCOSE 120* 112      Cardiovascular:  No results for input(s): "TROPONINI", "CKTOTAL", "CKMB", "BNP" in the last 168 hours.  ABG:  No results for input(s): "PH", "PO2", "PCO2", "HCO3", "BE" in the last 168 hours.   I have reviewed all pertinent lab results within the past 24 hours.    Imaging:  CT Pelvis With IV Contrast NO Oral Contrast   Final Result      As above.  Worsening inflammatory change of the sacral decubitus ulcers with gas now identified inferior to the right pubic ramus.  Stool noted throughout the colon as may be seen with " constipation.  Pyelonephritis is not excluded on the basis of this exam.         Electronically signed by: Konrad Robertson   Date:    10/08/2024   Time:    20:00      MRI Hip With Contrast Right    (Results Pending)      I have reviewed all pertinent imaging results/findings within the past 24 hours.    Micro/Path/Other:  Microbiology Results (last 7 days)       Procedure Component Value Units Date/Time    Urine culture [[id number]] Collected: 10/08/24 2338    Order Status: Completed Specimen: Urine Updated: 10/10/24 0630     Urine Culture No Growth At 24 Hours    Blood culture #1 **CANNOT BE ORDERED STAT** [[id number]]  (Normal) Collected: 10/08/24 1817    Order Status: Completed Specimen: Blood Updated: 10/09/24 1901     Blood Culture No Growth At 24 Hours    Wound Culture [[id number]] Collected: 10/09/24 1011    Order Status: Sent Specimen: Wound from Sacral Updated: 10/09/24 1016    Anaerobic Culture [[id number]] Collected: 10/09/24 1011    Order Status: Sent Specimen: Wound from Sacral Updated: 10/09/24 1016    Blood culture #2 **CANNOT BE ORDERED STAT** [[id number]] Collected: 10/09/24 0521    Order Status: Resulted Specimen: Blood from Arm, Right Updated: 10/09/24 0546           Pathology Results  (Last 7 days)      None             Assessment & Plan:   Bianca Khan is a 59 y.o. 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 Surgery consulted for consideration of wound debridement     Contiune abx per primary team  Plan for OR today for debridement  Ok for diet post op     Brian Davila MD MPH  [employer] [facility]

## 2024-10-10 NOTE — TRANSFER OF CARE
"Anesthesia Transfer of Care Note    Patient: Bianca Khan    Procedure(s) Performed: Procedure(s) (LRB):  *DEBRIDEMENT* INACTIVE (N/A)  APPLICATION, WOUND VAC (N/A)    Patient location: PACU    Anesthesia Type: general    Transport from OR: Transported from OR on room air with adequate spontaneous ventilation    Post pain: adequate analgesia    Post assessment: no apparent anesthetic complications    Post vital signs: stable    Level of consciousness: awake    Nausea/Vomiting: no nausea/vomiting    Complications: none    Transfer of care protocol was followed      Last vitals: Visit Vitals  BP (!) 140/79   Pulse 80   Temp 36 °C (96.8 °F)   Resp 15   Ht 5' 10" (1.778 m)   Wt 73 kg (160 lb 15 oz)   SpO2 100%   BMI 23.09 kg/m²     "

## 2024-10-10 NOTE — BRIEF OP NOTE
[facility] - Periop Services  Brief Operative Note    SUMMARY     Surgery Date: 10/10/2024     Surgeons and Role:     * Rob Sinclair MD - Primary    Assisting Surgeon: None    Pre-op Diagnosis:  Sacral wound [S31.000A]    Post-op Diagnosis:  Post-Op Diagnosis Codes:     * Sacral wound [S31.000A]    Procedure(s) (LRB):  *DEBRIDEMENT* INACTIVE (N/A)  APPLICATION, WOUND VAC (N/A)    Anesthesia: Choice    Implants:  * No implants in log *    Operative Findings: 7 x 2 x 4 stage IV decubitus ulcer of right gluteus; no evidence gross of osteomyelitis     Estimated Blood Loss: * No values recorded between 10/10/2024 10:01 AM and 10/10/2024 10:36 AM *    Estimated Blood Loss has not been documented. EBL = 10 mL.         Specimens:   Specimen (24h ago, onward)      None            [id number]

## 2024-10-10 NOTE — ANESTHESIA POSTPROCEDURE EVALUATION
Anesthesia Post Evaluation    Patient: Bianca Khan    Procedure(s) Performed: Procedure(s) (LRB):  *DEBRIDEMENT* INACTIVE (N/A)  APPLICATION, WOUND VAC (N/A)    Final Anesthesia Type: general      Patient location during evaluation: PACU  Patient participation: Yes- Able to Participate  Level of consciousness: awake and alert  Post-procedure vital signs: reviewed and stable  Pain management: adequate  Airway patency: patent      Anesthetic complications: no      Cardiovascular status: hemodynamically stable  Respiratory status: unassisted  Hydration status: euvolemic  Follow-up not needed.              Vitals Value Taken Time   /76 10/10/24 1113   Temp 36 °C (96.8 °F) 10/10/24 1050   Pulse 86 10/10/24 1112   Resp 19 10/10/24 1112   SpO2 100 % 10/10/24 1111   Vitals shown include unfiled device data.      No case tracking events are documented in the log.      Pain/Adriana Score: Pain Rating Prior to Med Admin: 9 (10/10/2024  4:01 AM)  Pain Rating Post Med Admin: 4 (10/10/2024  5:01 AM)  Adriana Score: 9 (10/10/2024 10:52 AM)

## 2024-10-10 NOTE — PROGRESS NOTES
Pharmacokinetic Follow Up: Tobramycin    Assessment of levels:     Level of 0.9 mcg/ml is in goal range of less than 1 mcg/ml.    Regimen Plan:     Continue 445 mg q24h.  Check trough on 10/13/24.      Recent Labs   Lab Result Units 10/09/24  0521 10/09/24  1959   Tobramycin Trough ug/ml 4.7* 0.9         Patient brief summary:  Bianca Khan is a 59 y.o. male initiated on aminoglycoside therapy for treatment of bone/joint infection    Drug Allergies:   Review of patient's allergies indicates:   Allergen Reactions    Baclofen Itching and Anxiety       Renal Function:   Estimated Creatinine Clearance: 72.4 mL/min (based on SCr of 1.12 mg/dL).,     Dialysis Method (if applicable):  N/A    CBC (last 72 hours):  Recent Labs   Lab Result Units 10/08/24  1817 10/09/24  0521   WBC x10(3)/mcL 9.54 7.66   Hgb g/dL 8.5* 8.2*   Hemoglobin A1c %  --  6.8   Hct % 27.9* 26.7*   Platelet x10(3)/mcL 492* 529*   Mono % % 10.2 10.8   Eos % % 0.8 1.0   Basophil % % 0.3 0.1       Metabolic Panel (last 72 hours):  Recent Labs   Lab Result Units 10/08/24  1817 10/08/24  2338 10/09/24  0521   Sodium mmol/L 137  --  142   Potassium mmol/L 4.0  --  4.3   Chloride mmol/L 107  --  110*   CO2 mmol/L 19*  --  21*   Glucose mg/dL 120*  --  112   Glucose, UA   --  Normal  --    Blood Urea Nitrogen mg/dL 21.4  --  16.4   Creatinine mg/dL 1.51*  --  1.12   Albumin g/dL 2.4*  --  2.5*   Bilirubin Total mg/dL 0.3  --  0.3   ALP unit/L 80  --  88   AST unit/L 18  --  14   ALT unit/L 10  --  10   Magnesium Level mg/dL  --   --  1.70   Phosphorus Level mg/dL  --   --  4.0       Aminoglycoside Administrations:  aminoglycosides given in last 96 hours                     tobramycin (NEBCIN) 445 mg in D5W 100 mL IVPB (mg) 445 mg New Bag 10/08/24 2124                    Microbiologic Results:  Microbiology Results (last 7 days)       Procedure Component Value Units Date/Time    Blood culture #1 **CANNOT BE ORDERED STAT** [[id number]]  (Normal) Collected:  10/08/24 1817    Order Status: Completed Specimen: Blood Updated: 10/09/24 1901     Blood Culture No Growth At 24 Hours    Wound Culture [[id number]] Collected: 10/09/24 1011    Order Status: Sent Specimen: Wound from Sacral Updated: 10/09/24 1016    Anaerobic Culture [[id number]] Collected: 10/09/24 1011    Order Status: Sent Specimen: Wound from Sacral Updated: 10/09/24 1016    Blood culture #2 **CANNOT BE ORDERED STAT** [[id number]] Collected: 10/09/24 0521    Order Status: Resulted Specimen: Blood from Arm, Right Updated: 10/09/24 0546    Urine culture [[id number]] Collected: 10/08/24 2338    Order Status: Sent Specimen: Urine Updated: 10/08/24 2344

## 2024-10-10 NOTE — ANESTHESIA PROCEDURE NOTES
Intubation    Date/Time: 10/10/2024 9:43 AM    Performed by: Dabadie, Virginia G, CRNA  Authorized by: Nabeel Wilhelm MD    Intubation:     Induction:  Intravenous    Intubated:  Postinduction    Mask Ventilation:  Easy mask    Attempts:  1    Attempted By:  CRNA    Method of Intubation:  Direct    Blade:  Rice 2    Laryngeal View Grade: Grade I - full view of cords      Difficult Airway Encountered?: No      Complications:  None    Airway Device:  Oral endotracheal tube    Airway Device Size:  7.0    Style/Cuff Inflation:  Cuffed (inflated to minimal occlusive pressure)    Tube secured:  21    Secured at:  The lips    Placement Verified By:  Capnometry    Complicating Factors:  None    Findings Post-Intubation:  BS equal bilateral and atraumatic/condition of teeth unchanged  Notes:      Cuff pressure 25 cmH2O

## 2024-10-10 NOTE — PLAN OF CARE
Problem: Adult Inpatient Plan of Care  Goal: Optimal Comfort and Wellbeing  10/10/2024 0110 by Riya Figueredo RN  Outcome: Progressing  10/10/2024 0110 by Riya Figueredo RN  Outcome: Progressing     Problem: Diabetes Comorbidity  Goal: Blood Glucose Level Within Targeted Range  10/10/2024 0110 by Riya Figueredo RN  Outcome: Progressing  10/10/2024 0110 by Riya Figueredo RN  Outcome: Progressing     Problem: Infection  Goal: Absence of Infection Signs and Symptoms  10/10/2024 0110 by Riya Figueredo RN  Outcome: Progressing  10/10/2024 0110 by Riya Figueredo RN  Outcome: Progressing     Problem: Pain Acute  Goal: Optimal Pain Control and Function  Outcome: Progressing

## 2024-10-11 LAB
ABO + RH BLD: NORMAL
ABO + RH BLD: NORMAL
ALBUMIN SERPL-MCNC: 2.3 G/DL (ref 3.4–4.8)
ALBUMIN/GLOB SERPL: 0.5 RATIO (ref 1.1–2)
ALP SERPL-CCNC: 78 UNIT/L (ref 40–150)
ALT SERPL-CCNC: 9 UNIT/L (ref 0–55)
ANION GAP SERPL CALC-SCNC: 7 MEQ/L
AST SERPL-CCNC: 11 UNIT/L (ref 5–34)
BACTERIA UR CULT: NO GROWTH
BASOPHILS # BLD AUTO: 0.01 X10(3)/MCL
BASOPHILS NFR BLD AUTO: 0.1 %
BILIRUB SERPL-MCNC: 0.2 MG/DL
BLD PROD TYP BPU: NORMAL
BLD PROD TYP BPU: NORMAL
BLOOD UNIT EXPIRATION DATE: NORMAL
BLOOD UNIT EXPIRATION DATE: NORMAL
BLOOD UNIT TYPE CODE: 5100
BLOOD UNIT TYPE CODE: 5100
BUN SERPL-MCNC: 14.2 MG/DL (ref 8.4–25.7)
CALCIUM SERPL-MCNC: 8.5 MG/DL (ref 8.8–10)
CHLORIDE SERPL-SCNC: 108 MMOL/L (ref 98–107)
CO2 SERPL-SCNC: 25 MMOL/L (ref 23–31)
CREAT SERPL-MCNC: 1.02 MG/DL (ref 0.73–1.18)
CREAT/UREA NIT SERPL: 14
CROSSMATCH INTERPRETATION: NORMAL
CROSSMATCH INTERPRETATION: NORMAL
DISPENSE STATUS: NORMAL
DISPENSE STATUS: NORMAL
EOSINOPHIL # BLD AUTO: 0 X10(3)/MCL (ref 0–0.9)
EOSINOPHIL NFR BLD AUTO: 0 %
ERYTHROCYTE [DISTWIDTH] IN BLOOD BY AUTOMATED COUNT: 17.7 % (ref 11.5–17)
GFR SERPLBLD CREATININE-BSD FMLA CKD-EPI: >60 ML/MIN/1.73/M2
GLOBULIN SER-MCNC: 4.5 GM/DL (ref 2.4–3.5)
GLUCOSE SERPL-MCNC: 121 MG/DL (ref 70–110)
GLUCOSE SERPL-MCNC: 122 MG/DL (ref 70–110)
GLUCOSE SERPL-MCNC: 167 MG/DL (ref 82–115)
GROUP & RH: NORMAL
HCT VFR BLD AUTO: 23 % (ref 42–52)
HCT VFR BLD AUTO: 26 % (ref 42–52)
HGB BLD-MCNC: 7.1 G/DL (ref 14–18)
HGB BLD-MCNC: 7.9 G/DL (ref 14–18)
IMM GRANULOCYTES # BLD AUTO: 0.05 X10(3)/MCL (ref 0–0.04)
IMM GRANULOCYTES NFR BLD AUTO: 0.5 %
INDIRECT COOMBS: NORMAL
LYMPHOCYTES # BLD AUTO: 2.73 X10(3)/MCL (ref 0.6–4.6)
LYMPHOCYTES NFR BLD AUTO: 29 %
MAGNESIUM SERPL-MCNC: 1.7 MG/DL (ref 1.6–2.6)
MCH RBC QN AUTO: 24.3 PG (ref 27–31)
MCHC RBC AUTO-ENTMCNC: 30.4 G/DL (ref 33–36)
MCV RBC AUTO: 80 FL (ref 80–94)
MONOCYTES # BLD AUTO: 0.82 X10(3)/MCL (ref 0.1–1.3)
MONOCYTES NFR BLD AUTO: 8.7 %
NEUTROPHILS # BLD AUTO: 5.82 X10(3)/MCL (ref 2.1–9.2)
NEUTROPHILS NFR BLD AUTO: 61.7 %
NRBC BLD AUTO-RTO: 0 %
PHOSPHATE SERPL-MCNC: 3.2 MG/DL (ref 2.3–4.7)
PLATELET # BLD AUTO: 514 X10(3)/MCL (ref 130–400)
PMV BLD AUTO: 8.8 FL (ref 7.4–10.4)
POCT GLUCOSE: 141 MG/DL (ref 70–110)
POTASSIUM SERPL-SCNC: 4.5 MMOL/L (ref 3.5–5.1)
PROT SERPL-MCNC: 6.8 GM/DL (ref 5.8–7.6)
RBC # BLD AUTO: 3.25 X10(6)/MCL (ref 4.7–6.1)
SODIUM SERPL-SCNC: 140 MMOL/L (ref 136–145)
SPECIMEN OUTDATE: NORMAL
UNIT NUMBER: NORMAL
UNIT NUMBER: NORMAL
WBC # BLD AUTO: 9.43 X10(3)/MCL (ref 4.5–11.5)

## 2024-10-11 PROCEDURE — 80053 COMPREHEN METABOLIC PANEL: CPT | Performed by: INTERNAL MEDICINE

## 2024-10-11 PROCEDURE — 30233N1 TRANSFUSION OF NONAUTOLOGOUS RED BLOOD CELLS INTO PERIPHERAL VEIN, PERCUTANEOUS APPROACH: ICD-10-PCS | Performed by: STUDENT IN AN ORGANIZED HEALTH CARE EDUCATION/TRAINING PROGRAM

## 2024-10-11 PROCEDURE — 85018 HEMOGLOBIN: CPT | Performed by: STUDENT IN AN ORGANIZED HEALTH CARE EDUCATION/TRAINING PROGRAM

## 2024-10-11 PROCEDURE — 86901 BLOOD TYPING SEROLOGIC RH(D): CPT | Performed by: STUDENT IN AN ORGANIZED HEALTH CARE EDUCATION/TRAINING PROGRAM

## 2024-10-11 PROCEDURE — 85025 COMPLETE CBC W/AUTO DIFF WBC: CPT | Performed by: STUDENT IN AN ORGANIZED HEALTH CARE EDUCATION/TRAINING PROGRAM

## 2024-10-11 PROCEDURE — P9016 RBC LEUKOCYTES REDUCED: HCPCS | Performed by: STUDENT IN AN ORGANIZED HEALTH CARE EDUCATION/TRAINING PROGRAM

## 2024-10-11 PROCEDURE — 25000003 PHARM REV CODE 250: Performed by: INTERNAL MEDICINE

## 2024-10-11 PROCEDURE — 63600175 PHARM REV CODE 636 W HCPCS: Performed by: INTERNAL MEDICINE

## 2024-10-11 PROCEDURE — 84100 ASSAY OF PHOSPHORUS: CPT | Performed by: STUDENT IN AN ORGANIZED HEALTH CARE EDUCATION/TRAINING PROGRAM

## 2024-10-11 PROCEDURE — 63600175 PHARM REV CODE 636 W HCPCS: Performed by: STUDENT IN AN ORGANIZED HEALTH CARE EDUCATION/TRAINING PROGRAM

## 2024-10-11 PROCEDURE — 83735 ASSAY OF MAGNESIUM: CPT | Performed by: STUDENT IN AN ORGANIZED HEALTH CARE EDUCATION/TRAINING PROGRAM

## 2024-10-11 PROCEDURE — 36415 COLL VENOUS BLD VENIPUNCTURE: CPT | Performed by: STUDENT IN AN ORGANIZED HEALTH CARE EDUCATION/TRAINING PROGRAM

## 2024-10-11 PROCEDURE — 25000003 PHARM REV CODE 250: Performed by: NURSE PRACTITIONER

## 2024-10-11 PROCEDURE — 86923 COMPATIBILITY TEST ELECTRIC: CPT | Performed by: STUDENT IN AN ORGANIZED HEALTH CARE EDUCATION/TRAINING PROGRAM

## 2024-10-11 PROCEDURE — 11000001 HC ACUTE MED/SURG PRIVATE ROOM

## 2024-10-11 PROCEDURE — 36415 COLL VENOUS BLD VENIPUNCTURE: CPT | Performed by: INTERNAL MEDICINE

## 2024-10-11 RX ORDER — DIPHENHYDRAMINE HYDROCHLORIDE 50 MG/ML
50 INJECTION INTRAMUSCULAR; INTRAVENOUS EVERY 6 HOURS PRN
Status: DISCONTINUED | OUTPATIENT
Start: 2024-10-11 | End: 2024-11-26 | Stop reason: HOSPADM

## 2024-10-11 RX ORDER — HYDROCODONE BITARTRATE AND ACETAMINOPHEN 500; 5 MG/1; MG/1
TABLET ORAL
Status: DISCONTINUED | OUTPATIENT
Start: 2024-10-11 | End: 2024-10-28

## 2024-10-11 RX ADMIN — OXYCODONE AND ACETAMINOPHEN 1 TABLET: 10; 325 TABLET ORAL at 10:10

## 2024-10-11 RX ADMIN — TRAZODONE HYDROCHLORIDE 200 MG: 100 TABLET ORAL at 11:10

## 2024-10-11 RX ADMIN — OXYBUTYNIN CHLORIDE 5 MG: 5 TABLET ORAL at 08:10

## 2024-10-11 RX ADMIN — SENNOSIDES AND DOCUSATE SODIUM 1 TABLET: 50; 8.6 TABLET ORAL at 07:10

## 2024-10-11 RX ADMIN — OXYCODONE AND ACETAMINOPHEN 1 TABLET: 10; 325 TABLET ORAL at 03:10

## 2024-10-11 RX ADMIN — ATORVASTATIN CALCIUM 20 MG: 10 TABLET, FILM COATED ORAL at 07:10

## 2024-10-11 RX ADMIN — OXYCODONE AND ACETAMINOPHEN 1 TABLET: 10; 325 TABLET ORAL at 07:10

## 2024-10-11 RX ADMIN — GABAPENTIN 400 MG: 400 CAPSULE ORAL at 05:10

## 2024-10-11 RX ADMIN — PANTOPRAZOLE SODIUM 40 MG: 40 TABLET, DELAYED RELEASE ORAL at 08:10

## 2024-10-11 RX ADMIN — OXYCODONE AND ACETAMINOPHEN 1 TABLET: 10; 325 TABLET ORAL at 04:10

## 2024-10-11 RX ADMIN — ENOXAPARIN SODIUM 70 MG: 80 INJECTION SUBCUTANEOUS at 05:10

## 2024-10-11 RX ADMIN — MEROPENEM 1 G: 1 INJECTION, POWDER, FOR SOLUTION INTRAVENOUS at 04:10

## 2024-10-11 RX ADMIN — DIPHENHYDRAMINE HYDROCHLORIDE 50 MG: 50 INJECTION INTRAMUSCULAR; INTRAVENOUS at 11:10

## 2024-10-11 RX ADMIN — OXYBUTYNIN CHLORIDE 5 MG: 5 TABLET ORAL at 07:10

## 2024-10-11 RX ADMIN — FLUOXETINE HYDROCHLORIDE 20 MG: 20 CAPSULE ORAL at 08:10

## 2024-10-11 RX ADMIN — MEROPENEM 1 G: 1 INJECTION, POWDER, FOR SOLUTION INTRAVENOUS at 12:10

## 2024-10-11 RX ADMIN — FLUCONAZOLE 200 MG: 2 INJECTION, SOLUTION INTRAVENOUS at 10:10

## 2024-10-11 RX ADMIN — CARVEDILOL 25 MG: 12.5 TABLET, FILM COATED ORAL at 07:10

## 2024-10-11 RX ADMIN — OXYCODONE AND ACETAMINOPHEN 1 TABLET: 10; 325 TABLET ORAL at 12:10

## 2024-10-11 RX ADMIN — GABAPENTIN 400 MG: 400 CAPSULE ORAL at 03:10

## 2024-10-11 RX ADMIN — SENNOSIDES AND DOCUSATE SODIUM 1 TABLET: 50; 8.6 TABLET ORAL at 08:10

## 2024-10-11 RX ADMIN — GABAPENTIN 400 MG: 400 CAPSULE ORAL at 10:10

## 2024-10-11 RX ADMIN — TOBRAMYCIN SULFATE 445 MG: 40 INJECTION, SOLUTION INTRAMUSCULAR; INTRAVENOUS at 08:10

## 2024-10-11 RX ADMIN — DOXYCYCLINE HYCLATE 100 MG: 100 TABLET, COATED ORAL at 08:10

## 2024-10-11 RX ADMIN — FENOFIBRATE 48 MG: 48 TABLET, FILM COATED ORAL at 09:10

## 2024-10-11 RX ADMIN — CARVEDILOL 25 MG: 12.5 TABLET, FILM COATED ORAL at 08:10

## 2024-10-11 RX ADMIN — MEROPENEM 1 G: 1 INJECTION, POWDER, FOR SOLUTION INTRAVENOUS at 07:10

## 2024-10-11 RX ADMIN — DOXYCYCLINE HYCLATE 100 MG: 100 TABLET, COATED ORAL at 07:10
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
## 2024-10-11 NOTE — CONSULTS
[facility] - 5th Floor Med Surg  Wound Care    Patient Name:  Bianca Khan   MRN:  [id number]  Date: 10/11/2024  Diagnosis: Sacral wound    History:     Past Medical History:   Diagnosis Date    Arthritis     Chronic ulcer of ankle 2022    ESBL (extended spectrum beta-lactamase) producing bacteria infection 2024    Frequent UTI 2019    Generalized anxiety disorder 2022    Neurogenic bladder 2022    Osteomyelitis 2022    Paraplegia     Presence of suprapubic catheter 2022    Pure hypercholesterolemia 2022    Retention of urine, unspecified 2019    Spinal cord injury at T1-T6 level 2018       Social History     Socioeconomic History    Marital status:    Tobacco Use    Smoking status: Some Days     Current packs/day: 0.00     Average packs/day: 0.2 packs/day for 41.5 years (10.4 ttl pk-yrs)     Types: Cigars, Cigarettes     Start date: 1982     Last attempt to quit: 2023     Years since quittin.2    Smokeless tobacco: Never   Substance and Sexual Activity    Alcohol use: Not Currently     Alcohol/week: 2.0 standard drinks of alcohol     Types: 2 Cans of beer per week    Drug use: Not Currently     Types: Oxycodone    Sexual activity: Not Currently     Partners: Female     Birth control/protection: None     Social Drivers of Health     Financial Resource Strain: Low Risk  (10/10/2024)    Overall Financial Resource Strain (CARDIA)     Difficulty of Paying Living Expenses: Not hard at all   Food Insecurity: No Food Insecurity (10/10/2024)    Hunger Vital Sign     Worried About Running Out of Food in the Last Year: Never true     Ran Out of Food in the Last Year: Never true   Transportation Needs: No Transportation Needs (10/10/2024)    TRANSPORTATION NEEDS     Transportation : No   Physical Activity: Inactive (2023)    Exercise Vital Sign     Days of Exercise per Week: 0 days     Minutes of Exercise per Session: 0 min    Stress: No Stress Concern Present (10/10/2024)    [employer] - Occupational Stress Questionnaire     Feeling of Stress : Only a little   Housing Stability: Low Risk  (10/10/2024)    Housing Stability Vital Sign     Unable to Pay for Housing in the Last Year: No     Homeless in the Last Year: No       Precautions:     Allergies as of 10/08/2024 - Reviewed 10/08/2024   Allergen Reaction Noted    Baclofen Itching and Anxiety 06/17/2019       WO Assessment Details/Treatment        10/11/24 1025   WOCN Assessment   Visit Date 10/11/24   Visit Time 1025   Consult Type Follow Up   WOCN Speciality Wound   WOCN List wound vac   Wound pressure;surgical   Procedure wound vac   Intervention chart review;assessed;coordination of care   Teaching on-going     WOCN consulted for wound vac. Pt. Went to OR yesterday 10/10/2024 for surgical debridement of R gluteus decubitus ulcer. Discussed POC w/ nurse Blanca. Went to pt. At bedside and pt. Is bleeding from wound. Charge nurse and pt.'s nurse to bedside. Charge nurse holding pressure to wound. Pt.'s nurse contacted surgeon to take a look and contacted pt.'s hospitalist for the day. Spoke to nurse around 11am and she stated that sx came to see the wound and had put surgicel to wound bed and dressed it. Spoke to sx resident in regards to not reapplying wound vac today due to bleeding which he stated to keep wound a vashe wet to dry over the weekend and reapply wound vac on Monday. Updated pt.'s nurse @1330 where she the notified me that the pt.'s dressing is soaked through. Contacted sx to let them know and they said they would be going back to look at the wound. Will follow up on Monday.     10/11/2024

## 2024-10-12 LAB
ANION GAP SERPL CALC-SCNC: 9 MEQ/L
BACTERIA SPEC ANAEROBE CULT: ABNORMAL
BACTERIA SPEC ANAEROBE CULT: ABNORMAL
BACTERIA WND CULT: ABNORMAL
BUN SERPL-MCNC: 16.3 MG/DL (ref 8.4–25.7)
CALCIUM SERPL-MCNC: 9 MG/DL (ref 8.8–10)
CHLORIDE SERPL-SCNC: 105 MMOL/L (ref 98–107)
CO2 SERPL-SCNC: 24 MMOL/L (ref 23–31)
CREAT SERPL-MCNC: 1.06 MG/DL (ref 0.73–1.18)
CREAT/UREA NIT SERPL: 15
ERYTHROCYTE [DISTWIDTH] IN BLOOD BY AUTOMATED COUNT: 16.8 % (ref 11.5–17)
GFR SERPLBLD CREATININE-BSD FMLA CKD-EPI: >60 ML/MIN/1.73/M2
GLUCOSE SERPL-MCNC: 114 MG/DL (ref 82–115)
HCT VFR BLD AUTO: 35.3 % (ref 42–52)
HGB BLD-MCNC: 11.5 G/DL (ref 14–18)
MCH RBC QN AUTO: 26.7 PG (ref 27–31)
MCHC RBC AUTO-ENTMCNC: 32.6 G/DL (ref 33–36)
MCV RBC AUTO: 81.9 FL (ref 80–94)
NRBC BLD AUTO-RTO: 0.2 %
PLATELET # BLD AUTO: 484 X10(3)/MCL (ref 130–400)
PMV BLD AUTO: 8.4 FL (ref 7.4–10.4)
POCT GLUCOSE: 122 MG/DL (ref 70–110)
POCT GLUCOSE: 134 MG/DL (ref 70–110)
POCT GLUCOSE: 165 MG/DL (ref 70–110)
POTASSIUM SERPL-SCNC: 5.2 MMOL/L (ref 3.5–5.1)
RBC # BLD AUTO: 4.31 X10(6)/MCL (ref 4.7–6.1)
SODIUM SERPL-SCNC: 138 MMOL/L (ref 136–145)
WBC # BLD AUTO: 9.97 X10(3)/MCL (ref 4.5–11.5)

## 2024-10-12 PROCEDURE — 76937 US GUIDE VASCULAR ACCESS: CPT

## 2024-10-12 PROCEDURE — 63600175 PHARM REV CODE 636 W HCPCS: Performed by: INTERNAL MEDICINE

## 2024-10-12 PROCEDURE — 80048 BASIC METABOLIC PNL TOTAL CA: CPT | Performed by: STUDENT IN AN ORGANIZED HEALTH CARE EDUCATION/TRAINING PROGRAM

## 2024-10-12 PROCEDURE — 25000003 PHARM REV CODE 250: Performed by: NURSE PRACTITIONER

## 2024-10-12 PROCEDURE — C1751 CATH, INF, PER/CENT/MIDLINE: HCPCS

## 2024-10-12 PROCEDURE — 36410 VNPNXR 3YR/> PHY/QHP DX/THER: CPT

## 2024-10-12 PROCEDURE — 99231 SBSQ HOSP IP/OBS SF/LOW 25: CPT | Mod: GC,,, | Performed by: STUDENT IN AN ORGANIZED HEALTH CARE EDUCATION/TRAINING PROGRAM

## 2024-10-12 PROCEDURE — 11000001 HC ACUTE MED/SURG PRIVATE ROOM

## 2024-10-12 PROCEDURE — 85027 COMPLETE CBC AUTOMATED: CPT | Performed by: STUDENT IN AN ORGANIZED HEALTH CARE EDUCATION/TRAINING PROGRAM

## 2024-10-12 PROCEDURE — 25000003 PHARM REV CODE 250: Performed by: STUDENT IN AN ORGANIZED HEALTH CARE EDUCATION/TRAINING PROGRAM

## 2024-10-12 PROCEDURE — A4216 STERILE WATER/SALINE, 10 ML: HCPCS | Performed by: STUDENT IN AN ORGANIZED HEALTH CARE EDUCATION/TRAINING PROGRAM

## 2024-10-12 PROCEDURE — 63600175 PHARM REV CODE 636 W HCPCS: Performed by: STUDENT IN AN ORGANIZED HEALTH CARE EDUCATION/TRAINING PROGRAM

## 2024-10-12 PROCEDURE — 25000003 PHARM REV CODE 250: Performed by: INTERNAL MEDICINE

## 2024-10-12 PROCEDURE — 36415 COLL VENOUS BLD VENIPUNCTURE: CPT | Performed by: STUDENT IN AN ORGANIZED HEALTH CARE EDUCATION/TRAINING PROGRAM

## 2024-10-12 RX ORDER — MORPHINE SULFATE 4 MG/ML
2 INJECTION, SOLUTION INTRAMUSCULAR; INTRAVENOUS 2 TIMES DAILY PRN
Status: DISCONTINUED | OUTPATIENT
Start: 2024-10-12 | End: 2024-11-26 | Stop reason: HOSPADM

## 2024-10-12 RX ORDER — SODIUM CHLORIDE 0.9 % (FLUSH) 0.9 %
10 SYRINGE (ML) INJECTION
Status: DISCONTINUED | OUTPATIENT
Start: 2024-10-12 | End: 2024-11-26 | Stop reason: HOSPADM

## 2024-10-12 RX ORDER — SODIUM CHLORIDE 0.9 % (FLUSH) 0.9 %
10 SYRINGE (ML) INJECTION EVERY 6 HOURS
Status: DISCONTINUED | OUTPATIENT
Start: 2024-10-12 | End: 2024-11-26 | Stop reason: HOSPADM

## 2024-10-12 RX ADMIN — OXYBUTYNIN CHLORIDE 5 MG: 5 TABLET ORAL at 08:10

## 2024-10-12 RX ADMIN — FLUOXETINE HYDROCHLORIDE 20 MG: 20 CAPSULE ORAL at 08:10

## 2024-10-12 RX ADMIN — FLUCONAZOLE 200 MG: 2 INJECTION, SOLUTION INTRAVENOUS at 09:10

## 2024-10-12 RX ADMIN — MORPHINE SULFATE 2 MG: 4 INJECTION, SOLUTION INTRAMUSCULAR; INTRAVENOUS at 05:10

## 2024-10-12 RX ADMIN — ATORVASTATIN CALCIUM 20 MG: 10 TABLET, FILM COATED ORAL at 08:10

## 2024-10-12 RX ADMIN — SENNOSIDES AND DOCUSATE SODIUM 1 TABLET: 50; 8.6 TABLET ORAL at 08:10

## 2024-10-12 RX ADMIN — Medication 6 MG: at 08:10

## 2024-10-12 RX ADMIN — OXYCODONE AND ACETAMINOPHEN 1 TABLET: 10; 325 TABLET ORAL at 08:10

## 2024-10-12 RX ADMIN — METHOCARBAMOL 750 MG: 750 TABLET ORAL at 11:10

## 2024-10-12 RX ADMIN — DOXYCYCLINE HYCLATE 100 MG: 100 TABLET, COATED ORAL at 08:10

## 2024-10-12 RX ADMIN — MEROPENEM 1 G: 1 INJECTION, POWDER, FOR SOLUTION INTRAVENOUS at 08:10

## 2024-10-12 RX ADMIN — DIPHENHYDRAMINE HYDROCHLORIDE 50 MG: 50 INJECTION INTRAMUSCULAR; INTRAVENOUS at 08:10

## 2024-10-12 RX ADMIN — GABAPENTIN 400 MG: 400 CAPSULE ORAL at 08:10

## 2024-10-12 RX ADMIN — TRAZODONE HYDROCHLORIDE 200 MG: 100 TABLET ORAL at 08:10

## 2024-10-12 RX ADMIN — TOBRAMYCIN SULFATE 445 MG: 40 INJECTION, SOLUTION INTRAMUSCULAR; INTRAVENOUS at 09:10

## 2024-10-12 RX ADMIN — MORPHINE SULFATE 2 MG: 4 INJECTION, SOLUTION INTRAMUSCULAR; INTRAVENOUS at 11:10

## 2024-10-12 RX ADMIN — ACETAMINOPHEN 650 MG: 325 TABLET, FILM COATED ORAL at 08:10

## 2024-10-12 RX ADMIN — GABAPENTIN 400 MG: 400 CAPSULE ORAL at 04:10

## 2024-10-12 RX ADMIN — SODIUM CHLORIDE, PRESERVATIVE FREE 10 ML: 5 INJECTION INTRAVENOUS at 08:10

## 2024-10-12 RX ADMIN — GABAPENTIN 400 MG: 400 CAPSULE ORAL at 01:10

## 2024-10-12 RX ADMIN — FENOFIBRATE 48 MG: 48 TABLET, FILM COATED ORAL at 08:10

## 2024-10-12 RX ADMIN — OXYCODONE AND ACETAMINOPHEN 1 TABLET: 10; 325 TABLET ORAL at 01:10

## 2024-10-12 RX ADMIN — CARVEDILOL 25 MG: 12.5 TABLET, FILM COATED ORAL at 08:10

## 2024-10-12 RX ADMIN — ENOXAPARIN SODIUM 70 MG: 80 INJECTION SUBCUTANEOUS at 05:10

## 2024-10-12 RX ADMIN — MEROPENEM 1 G: 1 INJECTION, POWDER, FOR SOLUTION INTRAVENOUS at 04:10

## 2024-10-12 RX ADMIN — OXYCODONE AND ACETAMINOPHEN 1 TABLET: 10; 325 TABLET ORAL at 04:10

## 2024-10-12 RX ADMIN — MEROPENEM 1 G: 1 INJECTION, POWDER, FOR SOLUTION INTRAVENOUS at 11:10

## 2024-10-12 RX ADMIN — PANTOPRAZOLE SODIUM 40 MG: 40 TABLET, DELAYED RELEASE ORAL at 08:10

## 2024-10-12 RX ADMIN — OXYCODONE AND ACETAMINOPHEN 1 TABLET: 10; 325 TABLET ORAL at 09:10
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
## 2024-10-12 NOTE — PROGRESS NOTES
Infectious Diseases Progress Note  60 y/o male with past medical history of T-spine cord injury with paraplegia, diabetes, HTN, hepatitis-C, chronic MRSA L ankle wound/osteomyelitis, was on suppressive doxycycline and R knee infection/septic arthritis and osteomyelitis with GBS/Enterococcus and Ecoli isolates who presented to ER on 10/8 with increased generalized pain, increased foul smelling drainage from sacral wound with fever and chills. He was noted to be hypotensive per EMS. On admit he was afebrile without leukocytosis. ESR 98 and .80. MRSA PCR (-). U/A with 21-50 WBC, 0-5 RBC, 75 LE, no bacteria, negative nitrites. Urine culture negative. Blood cultures negative thus far. Sacral wound culture with GNR. CT pelvis with contrast showed worsening inflammatory change of the sacral decubitus ulcers with gas now identified inferior to the right pubic ramus, stool noted throughout the colon as may be seen with constipation, pyelonephritis is not excluded. Seen by Surgery and underwent debridement of sacral wound.   He is currently on Merrem, Tobramycin and Diflucan    Subjective:  Lying in bed, no acute distress. No new complaints voiced. Afebrile. Wife present    Review of Systems   Constitutional:  Positive for malaise/fatigue.   HENT: Negative.     Eyes: Negative.    Respiratory: Negative.     Cardiovascular: Negative.    Gastrointestinal: Negative.    Musculoskeletal: Negative.    Skin:         Sacral wound   Neurological:  Positive for focal weakness and weakness.   All other systems reviewed and are negative.      Review of patient's allergies indicates:   Allergen Reactions    Baclofen Itching and Anxiety       Past Medical History:   Diagnosis Date    Arthritis     Chronic ulcer of ankle 05/26/2022    ESBL (extended spectrum beta-lactamase) producing bacteria infection 08/30/2024    Frequent UTI 07/02/2019    Generalized anxiety disorder 05/26/2022    Neurogenic bladder 05/26/2022    Osteomyelitis  05/26/2022    Paraplegia     Presence of suprapubic catheter 05/26/2022    Pure hypercholesterolemia 05/26/2022    Retention of urine, unspecified 08/09/2019    Spinal cord injury at T1-T6 level 04/20/2018       Past Surgical History:   Procedure Laterality Date    APPLICATION OF WOUND VACUUM-ASSISTED CLOSURE DEVICE N/A 10/10/2024    Procedure: APPLICATION, WOUND VAC;  Surgeon: Rob Sinclair MD;  Location: [facility];  Service: General;  Laterality: N/A;    EGD, WITH CLOSED BIOPSY N/A 8/29/2024    Procedure: EGD, WITH CLOSED BIOPSY;  Surgeon: Mikal Segovia MD;  Location: [facility] ENDOSCOPY;  Service: Gastroenterology;  Laterality: N/A;    ESOPHAGOGASTRODUODENOSCOPY N/A 8/29/2024    Procedure: EGD;  Surgeon: Mikal Segovia MD;  Location: [facility] ENDOSCOPY;  Service: Gastroenterology;  Laterality: N/A;    FRACTURE SURGERY  2021    INCISION AND DRAINAGE, LOWER EXTREMITY Right 06/29/2023    Procedure: INCISION AND DRAINAGE, LOWER EXTREMITY;  Surgeon: Prabhu Shen DO;  Location: [facility];  Service: Orthopedics;  Laterality: Right;  supine bone foam wash stuff cultures    INCISION AND DRAINAGE, LOWER EXTREMITY Right 11/30/2023    Procedure: INCISION AND DRAINAGE, LOWER EXTREMITY;  Surgeon: Prabhu Shen DO;  Location: [facility] OR;  Service: Orthopedics;  Laterality: Right;  supine any table bone foam wash stuff possible wound vac    INCISION AND DRAINAGE, LOWER EXTREMITY Right 12/1/2023    Procedure: INCISION AND DRAINAGE, LOWER EXTREMITY;  Surgeon: Prabhu Shen DO;  Location: [facility] OR;  Service: Orthopedics;  Laterality: Right;  supine bone foam vascular bed removal of distal femoral plate, wash stuff, possible AKA    INSERTION OF INTRAMEDULLARY MARISA Right 08/10/2022    Procedure: INSERTION, INTRAMEDULLARY MARISA RIGHT TIBIA;  Surgeon: Jorge Orellana MD;  Location: [facility];  Service: Orthopedics;  Laterality: Right;    JOINT REPLACEMENT  2021    Ankel    PRESSURE ULCER DEBRIDEMENT N/A 10/10/2024    Procedure:  DEBRIDEMENT, PRESSURE ULCER;  Surgeon: Rob Sinclair MD;  Location: [facility] OR;  Service: General;  Laterality: N/A;  sacral wound, R buttock wound    REMOVAL OF HARDWARE FROM LOWER EXTREMITY Right 2023    Procedure: REMOVAL, HARDWARE, LOWER EXTREMITY;  Surgeon: Prabhu Shen DO;  Location: [facility] OR;  Service: Orthopedics;  Laterality: Right;    SPINE SURGERY  2018       Social History     Socioeconomic History    Marital status:    Tobacco Use    Smoking status: Some Days     Current packs/day: 0.00     Average packs/day: 0.2 packs/day for 41.5 years (10.4 ttl pk-yrs)     Types: Cigars, Cigarettes     Start date: 1982     Last attempt to quit: 2023     Years since quittin.2    Smokeless tobacco: Never   Substance and Sexual Activity    Alcohol use: Not Currently     Alcohol/week: 2.0 standard drinks of alcohol     Types: 2 Cans of beer per week    Drug use: Not Currently     Types: Oxycodone    Sexual activity: Not Currently     Partners: Female     Birth control/protection: None     Social Drivers of Health     Financial Resource Strain: Low Risk  (10/10/2024)    Overall Financial Resource Strain (CARDIA)     Difficulty of Paying Living Expenses: Not hard at all   Food Insecurity: No Food Insecurity (10/10/2024)    Hunger Vital Sign     Worried About Running Out of Food in the Last Year: Never true     Ran Out of Food in the Last Year: Never true   Transportation Needs: No Transportation Needs (10/10/2024)    TRANSPORTATION NEEDS     Transportation : No   Physical Activity: Inactive (2023)    Exercise Vital Sign     Days of Exercise per Week: 0 days     Minutes of Exercise per Session: 0 min   Stress: No Stress Concern Present (10/10/2024)    [employer] - Occupational Stress Questionnaire     Feeling of Stress : Only a little   Housing Stability: Low Risk  (10/10/2024)    Housing Stability Vital Sign     Unable to Pay for Housing in the Last  "Year: No     Homeless in the Last Year: No         Scheduled Meds:   atorvastatin  20 mg Oral Nightly    carvediloL  25 mg Oral BID    doxycycline  100 mg Oral Q12H    enoxparin  1 mg/kg Subcutaneous Q12H (treatment, non-standard time)    fenofibrate  48 mg Oral Daily    fluconazole (DIFLUCAN) IV (PEDS and ADULTS)  200 mg Intravenous Q24H    FLUoxetine  20 mg Per G Tube Daily    gabapentin  400 mg Oral Q8H    meropenem IV (PEDS and ADULTS)  1 g Intravenous Q8H    oxybutynin  5 mg Oral BID    pantoprazole  40 mg Oral Daily    senna-docusate 8.6-50 mg  1 tablet Oral BID    tobramycin IV (PEDS and ADULTS)  7 mg/kg (Adjusted) Intravenous Q24H     Continuous Infusions:  PRN Meds:  Current Facility-Administered Medications:     0.9%  NaCl infusion (for blood administration), , Intravenous, Q24H PRN    acetaminophen, 650 mg, Oral, Q4H PRN    aluminum-magnesium hydroxide-simethicone, 30 mL, Oral, QID PRN    dextrose 10%, 12.5 g, Intravenous, PRN    dextrose 10%, 25 g, Intravenous, PRN    diphenhydrAMINE, 50 mg, Intravenous, Q6H PRN    glucagon (human recombinant), 1 mg, Intramuscular, PRN    glucose, 16 g, Oral, PRN    glucose, 24 g, Oral, PRN    insulin aspart U-100, 1-10 Units, Subcutaneous, QID (AC + HS) PRN    melatonin, 6 mg, Oral, Nightly PRN    methocarbamoL, 750 mg, Oral, TID PRN    ondansetron, 4 mg, Intravenous, Q4H PRN    oxyCODONE-acetaminophen, 1 tablet, Oral, Q4H PRN    polyethylene glycol, 17 g, Oral, BID PRN    prochlorperazine, 5 mg, Intravenous, Q6H PRN    sodium chloride 0.9%, 10 mL, Intravenous, PRN    tobramycin - pharmacy to dose, , Intravenous, pharmacy to manage frequency    traZODone, 200 mg, Oral, Nightly PRN    Objective:  /81   Pulse 81   Temp 97.5 °F (36.4 °C) (Oral)   Resp 18   Ht 5' 10" (1.778 m)   Wt 73 kg (160 lb 15 oz)   SpO2 98%   BMI 23.09 kg/m²     Physical Exam:   Physical Exam  Vitals reviewed.   HENT:      Head: Normocephalic.   Cardiovascular:      Rate and Rhythm: " Normal rate and regular rhythm.   Pulmonary:      Effort: Pulmonary effort is normal. No respiratory distress.      Breath sounds: Normal breath sounds. No wheezing.   Abdominal:      General: Bowel sounds are normal. There is no distension.      Palpations: Abdomen is soft.      Tenderness: There is no abdominal tenderness.   Genitourinary:     Comments: Suprapubic catheter  Musculoskeletal:      Cervical back: Normal range of motion.      Comments: Paraplegia   Skin:     Findings: No rash.      Comments: Wounds dressed   Neurological:      Mental Status: He is alert and oriented to person, place, and time.   Psychiatric:         Mood and Affect: Mood normal.         Behavior: Behavior normal.     Imaging    Lab Review   Recent Results (from the past 24 hours)   POCT Glucose, Hand-Held Device    Collection Time: 10/10/24  9:02 PM   Result Value Ref Range    POC Glucose 122 (A) 70 - 110 MG/DL   POCT Glucose, Hand-Held Device    Collection Time: 10/11/24  5:46 AM   Result Value Ref Range    POC Glucose 122 (A) 70 - 110 MG/DL   Comprehensive Metabolic Panel    Collection Time: 10/11/24  6:36 AM   Result Value Ref Range    Sodium 140 136 - 145 mmol/L    Potassium 4.5 3.5 - 5.1 mmol/L    Chloride 108 (H) 98 - 107 mmol/L    CO2 25 23 - 31 mmol/L    Glucose 167 (H) 82 - 115 mg/dL    Blood Urea Nitrogen 14.2 8.4 - 25.7 mg/dL    Creatinine 1.02 0.73 - 1.18 mg/dL    Calcium 8.5 (L) 8.8 - 10.0 mg/dL    Protein Total 6.8 5.8 - 7.6 gm/dL    Albumin 2.3 (L) 3.4 - 4.8 g/dL    Globulin 4.5 (H) 2.4 - 3.5 gm/dL    Albumin/Globulin Ratio 0.5 (L) 1.1 - 2.0 ratio    Bilirubin Total 0.2 <=1.5 mg/dL    ALP 78 40 - 150 unit/L    ALT 9 0 - 55 unit/L    AST 11 5 - 34 unit/L    eGFR >60 mL/min/1.73/m2    Anion Gap 7.0 mEq/L    BUN/Creatinine Ratio 14    Magnesium    Collection Time: 10/11/24  6:36 AM   Result Value Ref Range    Magnesium Level 1.70 1.60 - 2.60 mg/dL   Phosphorus    Collection Time: 10/11/24  6:36 AM   Result Value Ref Range     Phosphorus Level 3.2 2.3 - 4.7 mg/dL   CBC with Differential    Collection Time: 10/11/24  6:36 AM   Result Value Ref Range    WBC 9.43 4.50 - 11.50 x10(3)/mcL    RBC 3.25 (L) 4.70 - 6.10 x10(6)/mcL    Hgb 7.9 (L) 14.0 - 18.0 g/dL    Hct 26.0 (L) 42.0 - 52.0 %    MCV 80.0 80.0 - 94.0 fL    MCH 24.3 (L) 27.0 - 31.0 pg    MCHC 30.4 (L) 33.0 - 36.0 g/dL    RDW 17.7 (H) 11.5 - 17.0 %    Platelet 514 (H) 130 - 400 x10(3)/mcL    MPV 8.8 7.4 - 10.4 fL    Neut % 61.7 %    Lymph % 29.0 %    Mono % 8.7 %    Eos % 0.0 %    Basophil % 0.1 %    Lymph # 2.73 0.6 - 4.6 x10(3)/mcL    Neut # 5.82 2.1 - 9.2 x10(3)/mcL    Mono # 0.82 0.1 - 1.3 x10(3)/mcL    Eos # 0.00 0 - 0.9 x10(3)/mcL    Baso # 0.01 <=0.2 x10(3)/mcL    IG# 0.05 (H) 0 - 0.04 x10(3)/mcL    IG% 0.5 %    NRBC% 0.0 %   POCT glucose    Collection Time: 10/11/24 11:37 AM   Result Value Ref Range    POCT Glucose 141 (H) 70 - 110 mg/dL   Hemoglobin and Hematocrit    Collection Time: 10/11/24 12:04 PM   Result Value Ref Range    Hgb 7.1 (L) 14.0 - 18.0 g/dL    Hct 23.0 (L) 42.0 - 52.0 %   Prepare RBC 2 Units; need    Collection Time: 10/11/24  2:55 PM   Result Value Ref Range    UNIT NUMBER [id number]     UNIT ABO/RH O POS     DISPENSE STATUS Issued     Unit Expiration [id number]     Product Code E6200K52     Unit Blood Type Code 5100     CROSSMATCH INTERPRETATION Compatible     UNIT NUMBER [id number]     UNIT ABO/RH O POS     DISPENSE STATUS Selected     Unit Expiration [id number]     Product Code L8075M00     Unit Blood Type Code 5100     CROSSMATCH INTERPRETATION Compatible    Type & Screen    Collection Time: 10/11/24  2:55 PM   Result Value Ref Range    Group & Rh O POS     Indirect Elizabeth GEL NEG     Specimen Outdate 10/14/2024 23:59        Assessment/Plan:  Sepsis  Sacral wound infection - GNR isolated  ANTON  Pyelonephritis per CT   Constipation  Paraplegia  Neurogenic bladder with suprapubic catheter  DM Type II  MRSA L ankle osteomyelitis with retained  hardware  Anemia    -Continue Merrem #4, Tobramycin #4 and Diflucan #4  -Afebrile without leukocytosis  -CT pelvis with contrast showed worsening inflammatory change of sacral ulcer with gas inferior to the R pubic ramus  -Seen by Surgery, inputs noted  -S/P debridement of sacral wound on 10/10  -Sacral wound cultures revealing GNR, follow  -Continue wound care  -Blood cultures negative thus far  -Discussed with patient, wife and nursing

## 2024-10-12 NOTE — PROCEDURES
"Bianca Khan is a 59 y.o. male patient.    Temp: 97.4 °F (36.3 °C) (10/12/24 1557)  Pulse: 100 (10/12/24 1557)  Resp: 18 (10/12/24 1557)  BP: (!) 151/94 (10/12/24 1557)  SpO2: 96 % (10/12/24 1557)  Weight: 73 kg (160 lb 15 oz) (10/09/24 0430)  Height: 5' 10" (177.8 cm) (10/09/24 0430)    PICC  Date/Time: 10/12/2024 5:50 PM  Performed by: Reddy Pichardo RN  Consent Done: Yes  Time out: Immediately prior to procedure a time out was called to verify the correct patient, procedure, equipment, support staff and site/side marked as required  Indications: med administration and vascular access  Anesthesia: local infiltration  Local anesthetic: lidocaine 1% without epinephrine  Anesthetic Total (mL): 5  Preparation: skin prepped with ChloraPrep  Skin prep agent dried: skin prep agent completely dried prior to procedure  Sterile barriers: all five maximum sterile barriers used - cap, mask, sterile gown, sterile gloves, and large sterile sheet  Hand hygiene: hand hygiene performed prior to central venous catheter insertion  Location details: left brachial  Catheter type: single lumen  Catheter size: 4 Fr  Catheter Length: 16cm    Ultrasound guidance: yes  Vessel Caliber: medium and patent, compressibility normal  Needle advanced into vessel with real time Ultrasound guidance.  Guidewire confirmed in vessel.  Sterile sheath used.  Number of attempts: 1  Post-procedure: blood return through all ports and sterile dressing applied    Complications: none  Comments: refugio Pichardo RN  10/12/2024    "

## 2024-10-12 NOTE — PROGRESS NOTES
Acute Care Surgery   Progress Note  Admit Date: 10/8/2024  HD#4  POD#2 Days Post-Op    Subjective:   Interval history:  S/p OR wound debridement 10/10. Wound vac removed yesterday due to bleeding - venous oozing noted, controlled with pressure & packing.  AF HDS. NAEO. No acute complaints today. No strikethrough on dressing overnight.     Home Meds:  Current Outpatient Medications   Medication Instructions    amLODIPine (NORVASC) 10 mg, Oral, Daily    atorvastatin (LIPITOR) 20 mg, Oral, Nightly    carvediloL (COREG) 25 mg, Oral, 2 times daily with meals    celecoxib (CELEBREX) 200 mg, Daily    cloNIDine (CATAPRES) 0.1 mg, 3 times daily    collagenase (SANTYL) ointment Topical (Top), Twice weekly    doxycycline (VIBRA-TABS) 100 mg, Oral, Every 12 hours    famotidine (PEPCID) 20 mg, 2 times daily    fenofibrate (TRICOR) 48 mg, Daily    ferrous gluconate 324 mg, Oral, 2 times daily with meals    FLUoxetine 20 mg, Per G Tube, Daily    furosemide (LASIX) 20 mg, Daily    gabapentin (NEURONTIN) 400 mg, Every 8 hours    hydrALAZINE (APRESOLINE) 100 mg, 3 times daily    lisinopriL (PRINIVIL,ZESTRIL) 20 mg, Daily    methocarbamoL (ROBAXIN) 750 mg, 2 times daily    oxybutynin (DITROPAN) 5 mg, Oral, 2 times daily    oxyCODONE-acetaminophen (PERCOCET)  mg per tablet 1 tablet, Oral, Every 6 hours PRN    pantoprazole (PROTONIX) 40 mg, 2 times daily    traZODone (DESYREL) 200 mg, Oral, Nightly PRN    XARELTO 20 mg, Daily      Scheduled Meds:   atorvastatin  20 mg Oral Nightly    carvediloL  25 mg Oral BID    doxycycline  100 mg Oral Q12H    enoxparin  1 mg/kg Subcutaneous Q12H (treatment, non-standard time)    fenofibrate  48 mg Oral Daily    fluconazole (DIFLUCAN) IV (PEDS and ADULTS)  200 mg Intravenous Q24H    FLUoxetine  20 mg Per G Tube Daily    gabapentin  400 mg Oral Q8H    meropenem IV (PEDS and ADULTS)  1 g Intravenous Q8H    oxybutynin  5 mg Oral BID    pantoprazole  40 mg Oral Daily    senna-docusate 8.6-50 mg   "1 tablet Oral BID    tobramycin IV (PEDS and ADULTS)  7 mg/kg (Adjusted) Intravenous Q24H     Continuous Infusions:  PRN Meds:  Current Facility-Administered Medications:     0.9%  NaCl infusion (for blood administration), , Intravenous, Q24H PRN    acetaminophen, 650 mg, Oral, Q4H PRN    aluminum-magnesium hydroxide-simethicone, 30 mL, Oral, QID PRN    dextrose 10%, 12.5 g, Intravenous, PRN    dextrose 10%, 25 g, Intravenous, PRN    diphenhydrAMINE, 50 mg, Intravenous, Q6H PRN    glucagon (human recombinant), 1 mg, Intramuscular, PRN    glucose, 16 g, Oral, PRN    glucose, 24 g, Oral, PRN    insulin aspart U-100, 1-10 Units, Subcutaneous, QID (AC + HS) PRN    melatonin, 6 mg, Oral, Nightly PRN    methocarbamoL, 750 mg, Oral, TID PRN    ondansetron, 4 mg, Intravenous, Q4H PRN    oxyCODONE-acetaminophen, 1 tablet, Oral, Q4H PRN    polyethylene glycol, 17 g, Oral, BID PRN    prochlorperazine, 5 mg, Intravenous, Q6H PRN    sodium chloride 0.9%, 10 mL, Intravenous, PRN    tobramycin - pharmacy to dose, , Intravenous, pharmacy to manage frequency    traZODone, 200 mg, Oral, Nightly PRN     Objective:     VITAL SIGNS: 24 HR MIN & MAX LAST   Temp  Min: 97.5 °F (36.4 °C)  Max: 98.1 °F (36.7 °C)  97.5 °F (36.4 °C)   BP  Min: 105/68  Max: 179/87  (!) 179/87    Pulse  Min: 65  Max: 102  68    Resp  Min: 10  Max: 18  18    SpO2  Min: 96 %  Max: 99 %  98 %      HT: 5' 10" (177.8 cm)  WT: 73 kg (160 lb 15 oz)  BMI: 23.1     Intake/output:  Intake/Output - Last 3 Shifts         10/10 0700  10/11 0659 10/11 0700  10/12 0659 10/12 0700  10/13 0659    P.O. 0      Blood  409.6     IV Piggyback 500      Total Intake(mL/kg) 500 (6.8) 409.6 (5.6)     Urine (mL/kg/hr) 2750 (1.6) 1750 (1)     Stool 0 0     Total Output 2750 1750     Net -2250 -1340.4            Stool Occurrence 0 x 0 x             Intake/Output Summary (Last 24 hours) at 10/12/2024 0701  Last data filed at 10/12/2024 0441  Gross per 24 hour   Intake 409.58 ml   Output 1750 " "ml   Net -1340.42 ml           Lines/drains/airway:       Peripheral IV - Single Lumen 10/08/24 1753 18 G Anterior;Left;Proximal Forearm (Active)   Site Assessment Clean;Dry;Intact;No redness;No swelling 10/12/24 0400   Extremity Assessment Distal to IV No abnormal discoloration;No redness;No swelling 10/10/24 1043   Line Status Blood return noted 10/12/24 0400   Dressing Status Clean;Intact;Dry 10/12/24 0400   Dressing Intervention Integrity maintained 10/11/24 1600   Number of days: 3            Midline Catheter - Single Lumen 08/28/24 0251 Left brachial vein (Active)   Number of days: 45            Suprapubic Catheter 10/04/24 0800 (Active)   Dressing no dressing 10/10/24 1043   Characteristics no redness;no warmth;no drainage;dry 10/10/24 1348   Urine Color Yellow 10/10/24 1043   Collection Container Standard drainage bag 10/10/24 1043   Securement secured to upper leg w/ adhesive device 10/10/24 1043   Site Assessment Clean;Intact 10/11/24 1906   Catheter Care Performed yes 10/09/24 1940   Number of days: 7       Physical examination:  Gen: NAD, AAOx3, answering questions appropriately  HEENT:  CV: RR  Resp: NWOB  Abd: S/NT/ND  Ext: moving all extremities spontaneously and purposefully  Neuro: CN II-XII grossly intact  Skin/wounds:    Labs:  Renal:  Recent Labs     10/10/24  1344 10/11/24  0636   BUN 9.2 14.2   CREATININE 0.88 1.02     No results for input(s): "LACTIC" in the last 72 hours.  FENGI:  Recent Labs     10/10/24  1344 10/11/24  0636    140   K 4.5 4.5    108*   CO2 24 25   CALCIUM 8.2* 8.5*   MG  --  1.70   PHOS  --  3.2   ALBUMIN 2.4* 2.3*   BILITOT 0.2 0.2   AST 17 11   ALKPHOS 88 78   ALT 9 9     Heme:  Recent Labs     10/10/24  1344 10/11/24  0636 10/11/24  1204   HGB 8.4* 7.9* 7.1*   HCT 27.2* 26.0* 23.0*   * 514*  --      ID:  Recent Labs     10/10/24  1344 10/11/24  0636   WBC 9.59 9.43     CBG:  Recent Labs     10/10/24  1344 10/11/24  0636   GLUCOSE 128* 167*    " "  Cardiovascular:  No results for input(s): "TROPONINI", "CKTOTAL", "CKMB", "BNP" in the last 168 hours.  ABG:  No results for input(s): "PH", "PO2", "PCO2", "HCO3", "BE" in the last 168 hours.   I have reviewed all pertinent lab results within the past 24 hours.    Imaging:  CT Pelvis With IV Contrast NO Oral Contrast   Final Result      As above.  Worsening inflammatory change of the sacral decubitus ulcers with gas now identified inferior to the right pubic ramus.  Stool noted throughout the colon as may be seen with constipation.  Pyelonephritis is not excluded on the basis of this exam.         Electronically signed by: Konrad Robertson   Date:    10/08/2024   Time:    20:00      MRI Hip With Contrast Right    (Results Pending)      I have reviewed all pertinent imaging results/findings within the past 24 hours.    Micro/Path/Other:  Microbiology Results (last 7 days)       Procedure Component Value Units Date/Time    Blood culture #1 **CANNOT BE ORDERED STAT** [[id number]]  (Normal) Collected: 10/08/24 1817    Order Status: Completed Specimen: Blood Updated: 10/11/24 1900     Blood Culture No Growth At 72 Hours    Wound Culture [[id number]]  (Abnormal) Collected: 10/09/24 1011    Order Status: Completed Specimen: Wound from Sacral Updated: 10/11/24 1247     Wound Culture Many ACINETOBACTER BAUMANNII      Many Escherichia coli      Moderate Enterococcus faecalis    Blood culture #2 **CANNOT BE ORDERED STAT** [[id number]]  (Normal) Collected: 10/09/24 0521    Order Status: Completed Specimen: Blood from Arm, Right Updated: 10/11/24 1101     Blood Culture No Growth At 48 Hours    Anaerobic Culture [[id number]] Collected: 10/09/24 1011    Order Status: Completed Specimen: Wound from Sacral Updated: 10/11/24 0815     Anaerobe Culture No Anaerobes Isolated    Urine culture [[id number]] Collected: 10/08/24 2338    Order Status: Completed Specimen: Urine Updated: 10/11/24 0719     Urine Culture No Growth         "   Pathology Results  (Last 7 days)      None             Assessment & Plan:   Bianca Khan is a 59 y.o. 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 Surgery consulted for consideration of wound debridement      -S/p OR wound debridement 10/10  -Wound vac removed 10/11 due to bleeding  -Small amount of venous oozing noted, controlled with pressure and packing  -FU CBC today  -Possible re-application of wound vac Monday with wound care team; daily wet to dry dressing changes until then  -Remainder per primary       Karri Toney MD  General Surgery PGY-1  [facility]

## 2024-10-13 LAB
ANION GAP SERPL CALC-SCNC: 10 MEQ/L
BACTERIA BLD CULT: NORMAL
BUN SERPL-MCNC: 18.9 MG/DL (ref 8.4–25.7)
CALCIUM SERPL-MCNC: 8.9 MG/DL (ref 8.8–10)
CHLORIDE SERPL-SCNC: 103 MMOL/L (ref 98–107)
CO2 SERPL-SCNC: 27 MMOL/L (ref 23–31)
CREAT SERPL-MCNC: 0.96 MG/DL (ref 0.73–1.18)
CREAT/UREA NIT SERPL: 20
GFR SERPLBLD CREATININE-BSD FMLA CKD-EPI: >60 ML/MIN/1.73/M2
GLUCOSE SERPL-MCNC: 117 MG/DL (ref 82–115)
POCT GLUCOSE: 121 MG/DL (ref 70–110)
POCT GLUCOSE: 122 MG/DL (ref 70–110)
POCT GLUCOSE: 130 MG/DL (ref 70–110)
POTASSIUM SERPL-SCNC: 4.8 MMOL/L (ref 3.5–5.1)
SODIUM SERPL-SCNC: 140 MMOL/L (ref 136–145)
TOBRAMYCIN TROUGH SERPL-MCNC: 1.2 UG/ML (ref 0.3–2)

## 2024-10-13 PROCEDURE — A4216 STERILE WATER/SALINE, 10 ML: HCPCS | Performed by: STUDENT IN AN ORGANIZED HEALTH CARE EDUCATION/TRAINING PROGRAM

## 2024-10-13 PROCEDURE — 25000003 PHARM REV CODE 250: Performed by: NURSE PRACTITIONER

## 2024-10-13 PROCEDURE — 25000003 PHARM REV CODE 250: Performed by: INTERNAL MEDICINE

## 2024-10-13 PROCEDURE — 63600175 PHARM REV CODE 636 W HCPCS: Performed by: INTERNAL MEDICINE

## 2024-10-13 PROCEDURE — 25000003 PHARM REV CODE 250: Performed by: STUDENT IN AN ORGANIZED HEALTH CARE EDUCATION/TRAINING PROGRAM

## 2024-10-13 PROCEDURE — 80200 ASSAY OF TOBRAMYCIN: CPT | Performed by: STUDENT IN AN ORGANIZED HEALTH CARE EDUCATION/TRAINING PROGRAM

## 2024-10-13 PROCEDURE — 99231 SBSQ HOSP IP/OBS SF/LOW 25: CPT | Mod: GC,,, | Performed by: STUDENT IN AN ORGANIZED HEALTH CARE EDUCATION/TRAINING PROGRAM

## 2024-10-13 PROCEDURE — 11000001 HC ACUTE MED/SURG PRIVATE ROOM

## 2024-10-13 PROCEDURE — 63600175 PHARM REV CODE 636 W HCPCS: Performed by: STUDENT IN AN ORGANIZED HEALTH CARE EDUCATION/TRAINING PROGRAM

## 2024-10-13 PROCEDURE — 36415 COLL VENOUS BLD VENIPUNCTURE: CPT | Performed by: STUDENT IN AN ORGANIZED HEALTH CARE EDUCATION/TRAINING PROGRAM

## 2024-10-13 PROCEDURE — 80048 BASIC METABOLIC PNL TOTAL CA: CPT | Performed by: STUDENT IN AN ORGANIZED HEALTH CARE EDUCATION/TRAINING PROGRAM

## 2024-10-13 RX ADMIN — CARVEDILOL 25 MG: 12.5 TABLET, FILM COATED ORAL at 08:10

## 2024-10-13 RX ADMIN — MEROPENEM 1 G: 1 INJECTION, POWDER, FOR SOLUTION INTRAVENOUS at 01:10

## 2024-10-13 RX ADMIN — SODIUM CHLORIDE, PRESERVATIVE FREE 10 ML: 5 INJECTION INTRAVENOUS at 12:10

## 2024-10-13 RX ADMIN — OXYCODONE AND ACETAMINOPHEN 1 TABLET: 10; 325 TABLET ORAL at 02:10

## 2024-10-13 RX ADMIN — OXYCODONE AND ACETAMINOPHEN 1 TABLET: 10; 325 TABLET ORAL at 09:10

## 2024-10-13 RX ADMIN — FENOFIBRATE 48 MG: 48 TABLET, FILM COATED ORAL at 09:10

## 2024-10-13 RX ADMIN — Medication 6 MG: at 08:10

## 2024-10-13 RX ADMIN — SENNOSIDES AND DOCUSATE SODIUM 1 TABLET: 50; 8.6 TABLET ORAL at 09:10

## 2024-10-13 RX ADMIN — MORPHINE SULFATE 2 MG: 4 INJECTION, SOLUTION INTRAMUSCULAR; INTRAVENOUS at 11:10

## 2024-10-13 RX ADMIN — METHOCARBAMOL 750 MG: 750 TABLET ORAL at 04:10

## 2024-10-13 RX ADMIN — ENOXAPARIN SODIUM 70 MG: 80 INJECTION SUBCUTANEOUS at 04:10

## 2024-10-13 RX ADMIN — OXYBUTYNIN CHLORIDE 5 MG: 5 TABLET ORAL at 08:10

## 2024-10-13 RX ADMIN — TRAZODONE HYDROCHLORIDE 200 MG: 100 TABLET ORAL at 08:10

## 2024-10-13 RX ADMIN — OXYCODONE AND ACETAMINOPHEN 1 TABLET: 10; 325 TABLET ORAL at 08:10

## 2024-10-13 RX ADMIN — PANTOPRAZOLE SODIUM 40 MG: 40 TABLET, DELAYED RELEASE ORAL at 09:10

## 2024-10-13 RX ADMIN — DOXYCYCLINE HYCLATE 100 MG: 100 TABLET, COATED ORAL at 09:10

## 2024-10-13 RX ADMIN — GABAPENTIN 400 MG: 400 CAPSULE ORAL at 10:10

## 2024-10-13 RX ADMIN — SODIUM CHLORIDE, PRESERVATIVE FREE 10 ML: 5 INJECTION INTRAVENOUS at 01:10

## 2024-10-13 RX ADMIN — OXYCODONE AND ACETAMINOPHEN 1 TABLET: 10; 325 TABLET ORAL at 04:10

## 2024-10-13 RX ADMIN — OXYBUTYNIN CHLORIDE 5 MG: 5 TABLET ORAL at 09:10

## 2024-10-13 RX ADMIN — GABAPENTIN 400 MG: 400 CAPSULE ORAL at 01:10

## 2024-10-13 RX ADMIN — FLUOXETINE HYDROCHLORIDE 20 MG: 20 CAPSULE ORAL at 09:10

## 2024-10-13 RX ADMIN — ATORVASTATIN CALCIUM 20 MG: 10 TABLET, FILM COATED ORAL at 08:10

## 2024-10-13 RX ADMIN — MEROPENEM 1 G: 1 INJECTION, POWDER, FOR SOLUTION INTRAVENOUS at 04:10

## 2024-10-13 RX ADMIN — SODIUM CHLORIDE, PRESERVATIVE FREE 10 ML: 5 INJECTION INTRAVENOUS at 06:10

## 2024-10-13 RX ADMIN — FLUCONAZOLE 200 MG: 2 INJECTION, SOLUTION INTRAVENOUS at 10:10

## 2024-10-13 RX ADMIN — GABAPENTIN 400 MG: 400 CAPSULE ORAL at 06:10

## 2024-10-13 RX ADMIN — DOXYCYCLINE HYCLATE 100 MG: 100 TABLET, COATED ORAL at 08:10

## 2024-10-13 RX ADMIN — CARVEDILOL 25 MG: 12.5 TABLET, FILM COATED ORAL at 09:10

## 2024-10-13 RX ADMIN — MEROPENEM 1 G: 1 INJECTION, POWDER, FOR SOLUTION INTRAVENOUS at 08:10

## 2024-10-13 NOTE — PROGRESS NOTES
Acute Care Surgery   Progress Note  Admit Date: 10/8/2024  HD#5  POD#3 Days Post-Op    Subjective:   Interval history:  NAEO, AF, hypertensive to 187 systolic)  Plan for wet-to-dry dressing change at bedside today    Home Meds:  Current Outpatient Medications   Medication Instructions    amLODIPine (NORVASC) 10 mg, Oral, Daily    atorvastatin (LIPITOR) 20 mg, Oral, Nightly    carvediloL (COREG) 25 mg, Oral, 2 times daily with meals    celecoxib (CELEBREX) 200 mg, Daily    cloNIDine (CATAPRES) 0.1 mg, 3 times daily    collagenase (SANTYL) ointment Topical (Top), Twice weekly    doxycycline (VIBRA-TABS) 100 mg, Oral, Every 12 hours    famotidine (PEPCID) 20 mg, 2 times daily    fenofibrate (TRICOR) 48 mg, Daily    ferrous gluconate 324 mg, Oral, 2 times daily with meals    FLUoxetine 20 mg, Per G Tube, Daily    furosemide (LASIX) 20 mg, Daily    gabapentin (NEURONTIN) 400 mg, Every 8 hours    hydrALAZINE (APRESOLINE) 100 mg, 3 times daily    lisinopriL (PRINIVIL,ZESTRIL) 20 mg, Daily    methocarbamoL (ROBAXIN) 750 mg, 2 times daily    oxybutynin (DITROPAN) 5 mg, Oral, 2 times daily    oxyCODONE-acetaminophen (PERCOCET)  mg per tablet 1 tablet, Oral, Every 6 hours PRN    pantoprazole (PROTONIX) 40 mg, 2 times daily    traZODone (DESYREL) 200 mg, Oral, Nightly PRN    XARELTO 20 mg, Daily      Scheduled Meds:   atorvastatin  20 mg Oral Nightly    carvediloL  25 mg Oral BID    doxycycline  100 mg Oral Q12H    enoxparin  1 mg/kg Subcutaneous Q12H (treatment, non-standard time)    fenofibrate  48 mg Oral Daily    fluconazole (DIFLUCAN) IV (PEDS and ADULTS)  200 mg Intravenous Q24H    FLUoxetine  20 mg Per G Tube Daily    gabapentin  400 mg Oral Q8H    meropenem IV (PEDS and ADULTS)  1 g Intravenous Q8H    oxybutynin  5 mg Oral BID    pantoprazole  40 mg Oral Daily    senna-docusate 8.6-50 mg  1 tablet Oral BID    sodium chloride 0.9%  10 mL Intravenous Q6H    tobramycin IV (PEDS and ADULTS)  7 mg/kg (Adjusted)  "Intravenous Q24H     Continuous Infusions:  PRN Meds:  Current Facility-Administered Medications:     0.9%  NaCl infusion (for blood administration), , Intravenous, Q24H PRN    acetaminophen, 650 mg, Oral, Q4H PRN    aluminum-magnesium hydroxide-simethicone, 30 mL, Oral, QID PRN    dextrose 10%, 12.5 g, Intravenous, PRN    dextrose 10%, 25 g, Intravenous, PRN    diphenhydrAMINE, 50 mg, Intravenous, Q6H PRN    glucagon (human recombinant), 1 mg, Intramuscular, PRN    glucose, 16 g, Oral, PRN    glucose, 24 g, Oral, PRN    insulin aspart U-100, 1-10 Units, Subcutaneous, QID (AC + HS) PRN    melatonin, 6 mg, Oral, Nightly PRN    methocarbamoL, 750 mg, Oral, TID PRN    morphine, 2 mg, Intravenous, BID PRN    ondansetron, 4 mg, Intravenous, Q4H PRN    oxyCODONE-acetaminophen, 1 tablet, Oral, Q4H PRN    polyethylene glycol, 17 g, Oral, BID PRN    prochlorperazine, 5 mg, Intravenous, Q6H PRN    sodium chloride 0.9%, 10 mL, Intravenous, PRN    Flushing PICC/Midline Protocol, , , Until Discontinued **AND** sodium chloride 0.9%, 10 mL, Intravenous, Q6H **AND** sodium chloride 0.9%, 10 mL, Intravenous, PRN    tobramycin - pharmacy to dose, , Intravenous, pharmacy to manage frequency    traZODone, 200 mg, Oral, Nightly PRN     Objective:     VITAL SIGNS: 24 HR MIN & MAX LAST   Temp  Min: 97.4 °F (36.3 °C)  Max: 98 °F (36.7 °C)  97.8 °F (36.6 °C)   BP  Min: 128/84  Max: 187/102  (!) 186/99    Pulse  Min: 65  Max: 100  66    Resp  Min: 18  Max: 20  20    SpO2  Min: 96 %  Max: 98 %  98 %      HT: 5' 10" (177.8 cm)  WT: 73 kg (160 lb 15 oz)  BMI: 23.1     Intake/output:  Intake/Output - Last 3 Shifts         10/11 0700  10/12 0659 10/12 0700  10/13 0659 10/13 0700  10/14 0659    P.O.       Blood 409.6      IV Piggyback       Total Intake(mL/kg) 409.6 (5.6)      Urine (mL/kg/hr) 1750 (1) 2875 (1.6)     Stool 0      Total Output 1750 2875     Net -1340.4 -2875            Stool Occurrence 0 x              Intake/Output Summary (Last " "24 hours) at 10/13/2024 1125  Last data filed at 10/13/2024 0643  Gross per 24 hour   Intake --   Output 2875 ml   Net -2875 ml           Lines/drains/airway:       Peripheral IV - Single Lumen 10/08/24 1753 18 G Anterior;Left;Proximal Forearm (Active)   Site Assessment Clean;Dry;Intact;No redness;No swelling 10/12/24 0400   Extremity Assessment Distal to IV No abnormal discoloration;No redness;No swelling 10/10/24 1043   Line Status Blood return noted 10/12/24 0400   Dressing Status Clean;Intact;Dry 10/12/24 0400   Dressing Intervention Integrity maintained 10/11/24 1600   Number of days: 3            Midline Catheter - Single Lumen 08/28/24 0251 Left brachial vein (Active)   Number of days: 45            Suprapubic Catheter 10/04/24 0800 (Active)   Dressing no dressing 10/10/24 1043   Characteristics no redness;no warmth;no drainage;dry 10/10/24 1348   Urine Color Yellow 10/10/24 1043   Collection Container Standard drainage bag 10/10/24 1043   Securement secured to upper leg w/ adhesive device 10/10/24 1043   Site Assessment Clean;Intact 10/11/24 1906   Catheter Care Performed yes 10/09/24 1940   Number of days: 7       Physical examination:  Gen: NAD, AAOx3, answering questions appropriately  HEENT:  CV: RR  Resp: NWOB  Abd: S/NT/ND  Ext: moving all extremities spontaneously and purposefully  Neuro: CN II-XII grossly intact  Skin/wounds: ischial/sacral wounds with dressings in place    Labs:  Renal:  Recent Labs     10/10/24  1344 10/11/24  0636 10/12/24  1048 10/13/24  0913   BUN 9.2 14.2 16.3 18.9   CREATININE 0.88 1.02 1.06 0.96     No results for input(s): "LACTIC" in the last 72 hours.  FENGI:  Recent Labs     10/10/24  1344 10/11/24  0636 10/12/24  1048 10/13/24  0913    140 138 140   K 4.5 4.5 5.2* 4.8    108* 105 103   CO2 24 25 24 27   CALCIUM 8.2* 8.5* 9.0 8.9   MG  --  1.70  --   --    PHOS  --  3.2  --   --    ALBUMIN 2.4* 2.3*  --   --    BILITOT 0.2 0.2  --   --    AST 17 11  --   --  " "  ALKPHOS 88 78  --   --    ALT 9 9  --   --      Heme:  Recent Labs     10/10/24  1344 10/11/24  0636 10/11/24  1204 10/12/24  1048   HGB 8.4* 7.9* 7.1* 11.5*   HCT 27.2* 26.0* 23.0* 35.3*   * 514*  --  484*     ID:  Recent Labs     10/10/24  1344 10/11/24  0636 10/12/24  1048   WBC 9.59 9.43 9.97     CBG:  Recent Labs     10/10/24  1344 10/11/24  0636 10/12/24  1048 10/13/24  0913   GLUCOSE 128* 167* 114 117*      Cardiovascular:  No results for input(s): "TROPONINI", "CKTOTAL", "CKMB", "BNP" in the last 168 hours.  ABG:  No results for input(s): "PH", "PO2", "PCO2", "HCO3", "BE" in the last 168 hours.   I have reviewed all pertinent lab results within the past 24 hours.    Imaging:  CT Pelvis With IV Contrast NO Oral Contrast   Final Result      As above.  Worsening inflammatory change of the sacral decubitus ulcers with gas now identified inferior to the right pubic ramus.  Stool noted throughout the colon as may be seen with constipation.  Pyelonephritis is not excluded on the basis of this exam.         Electronically signed by: Konrad Robertson   Date:    10/08/2024   Time:    20:00      MRI Hip With Contrast Right    (Results Pending)      I have reviewed all pertinent imaging results/findings within the past 24 hours.    Micro/Path/Other:  Microbiology Results (last 7 days)       Procedure Component Value Units Date/Time    Blood culture #2 **CANNOT BE ORDERED STAT** [[id number]]  (Normal) Collected: 10/09/24 0521    Order Status: Completed Specimen: Blood from Arm, Right Updated: 10/13/24 1100     Blood Culture No Growth At 96 Hours    Blood culture #1 **CANNOT BE ORDERED STAT** [[id number]]  (Normal) Collected: 10/08/24 1817    Order Status: Completed Specimen: Blood Updated: 10/12/24 1900     Blood Culture No Growth At 96 Hours    Wound Culture [[id number]]  (Abnormal)  (Susceptibility) Collected: 10/09/24 1011    Order Status: Completed Specimen: Wound from Sacral Updated: 10/12/24 1219     Wound " Culture Many ACINETOBACTER BAUMANNII     Comment: CRAB (Carbapenem-resistant Acinetobacter baumannii)         Many Escherichia coli ESBL      Moderate Enterococcus faecalis    Anaerobic Culture [[id number]]  (Abnormal) Collected: 10/09/24 1011    Order Status: Completed Specimen: Wound from Sacral Updated: 10/12/24 1123     Anaerobe Culture Bacteroides thetaiotaomicron      Peptoniphilus asaccharolyticus     Comment: Anaerobic sensitivity is not usually indicated except on single isolates from sterile body sites.       Urine culture [[id number]] Collected: 10/08/24 2338    Order Status: Completed Specimen: Urine Updated: 10/11/24 0719     Urine Culture No Growth           Pathology Results  (Last 7 days)      None             Assessment & Plan:   Bianca Kahn is a 59 y.o. 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 Surgery consulted for consideration of wound debridement. S/p OR debridement 10/10 with WV placement which was removed 10/11.    - Wet-to-dry dressing change today at bedside. Will defer to wound care team moving forward. May consider WV replacement on Monday per wound care.  - Remainder of care per primary    Vitaliy Zimmerman MD  [employer] General Surgery PGY-2

## 2024-10-13 NOTE — PLAN OF CARE
Problem: Adult Inpatient Plan of Care  Goal: Plan of Care Review  Outcome: Progressing  Flowsheets (Taken 10/13/2024 1252)  Plan of Care Reviewed With: patient  Goal: Patient-Specific Goal (Individualized)  Outcome: Progressing  Flowsheets (Taken 10/13/2024 1252)  Individualized Care Needs: to go home  Anxieties, Fears or Concerns: hospitalzation  Patient/Family-Specific Goals (Include Timeframe): to go home  Goal: Absence of Hospital-Acquired Illness or Injury  Outcome: Progressing  Intervention: Identify and Manage Fall Risk  Flowsheets (Taken 10/13/2024 1252)  Safety Promotion/Fall Prevention:   assistive device/personal item within reach   nonskid shoes/socks when out of bed  Intervention: Prevent Skin Injury  Flowsheets (Taken 10/13/2024 1252)  Body Position: turned  Skin Protection: incontinence pads utilized  Device Skin Pressure Protection: positioning supports utilized  Intervention: Prevent and Manage VTE (Venous Thromboembolism) Risk  Flowsheets (Taken 10/13/2024 1252)  VTE Prevention/Management: remove, assess skin, and reapply sequential compression device  Intervention: Prevent Infection  Flowsheets (Taken 10/13/2024 1252)  Infection Prevention: hand hygiene promoted  Goal: Optimal Comfort and Wellbeing  Outcome: Progressing  Goal: Readiness for Transition of Care  Outcome: Progressing

## 2024-10-14 LAB
BACTERIA BLD CULT: NORMAL
POCT GLUCOSE: 102 MG/DL (ref 70–110)
POCT GLUCOSE: 116 MG/DL (ref 70–110)
POCT GLUCOSE: 119 MG/DL (ref 70–110)
POCT GLUCOSE: 82 MG/DL (ref 70–110)

## 2024-10-14 PROCEDURE — 63600175 PHARM REV CODE 636 W HCPCS: Performed by: INTERNAL MEDICINE

## 2024-10-14 PROCEDURE — A4216 STERILE WATER/SALINE, 10 ML: HCPCS | Performed by: STUDENT IN AN ORGANIZED HEALTH CARE EDUCATION/TRAINING PROGRAM

## 2024-10-14 PROCEDURE — 25000003 PHARM REV CODE 250: Performed by: NURSE PRACTITIONER

## 2024-10-14 PROCEDURE — 97606 NEG PRS WND THER DME>50 SQCM: CPT

## 2024-10-14 PROCEDURE — 63600175 PHARM REV CODE 636 W HCPCS: Performed by: STUDENT IN AN ORGANIZED HEALTH CARE EDUCATION/TRAINING PROGRAM

## 2024-10-14 PROCEDURE — 11000001 HC ACUTE MED/SURG PRIVATE ROOM

## 2024-10-14 PROCEDURE — 25000003 PHARM REV CODE 250: Performed by: INTERNAL MEDICINE

## 2024-10-14 PROCEDURE — 27000207 HC ISOLATION

## 2024-10-14 PROCEDURE — 25000003 PHARM REV CODE 250: Performed by: STUDENT IN AN ORGANIZED HEALTH CARE EDUCATION/TRAINING PROGRAM

## 2024-10-14 PROCEDURE — 63600175 PHARM REV CODE 636 W HCPCS: Performed by: NURSE PRACTITIONER

## 2024-10-14 RX ORDER — HYOSCYAMINE SULFATE 0.12 MG/1
0.12 TABLET SUBLINGUAL EVERY 4 HOURS PRN
Status: DISCONTINUED | OUTPATIENT
Start: 2024-10-14 | End: 2024-11-08

## 2024-10-14 RX ORDER — CIPROFLOXACIN 2 MG/ML
400 INJECTION, SOLUTION INTRAVENOUS
Status: DISCONTINUED | OUTPATIENT
Start: 2024-10-14 | End: 2024-10-14 | Stop reason: RX

## 2024-10-14 RX ORDER — CIPROFLOXACIN 2 MG/ML
400 INJECTION, SOLUTION INTRAVENOUS
Status: DISCONTINUED | OUTPATIENT
Start: 2024-10-14 | End: 2024-10-17

## 2024-10-14 RX ORDER — OXYBUTYNIN CHLORIDE 5 MG/1
5 TABLET ORAL 3 TIMES DAILY
Status: DISCONTINUED | OUTPATIENT
Start: 2024-10-14 | End: 2024-10-17

## 2024-10-14 RX ADMIN — OXYBUTYNIN CHLORIDE 5 MG: 5 TABLET ORAL at 08:10

## 2024-10-14 RX ADMIN — SODIUM CHLORIDE, PRESERVATIVE FREE 10 ML: 5 INJECTION INTRAVENOUS at 06:10

## 2024-10-14 RX ADMIN — OXYCODONE AND ACETAMINOPHEN 1 TABLET: 10; 325 TABLET ORAL at 09:10

## 2024-10-14 RX ADMIN — SENNOSIDES AND DOCUSATE SODIUM 1 TABLET: 50; 8.6 TABLET ORAL at 08:10

## 2024-10-14 RX ADMIN — SODIUM CHLORIDE, PRESERVATIVE FREE 10 ML: 5 INJECTION INTRAVENOUS at 05:10

## 2024-10-14 RX ADMIN — CARVEDILOL 25 MG: 12.5 TABLET, FILM COATED ORAL at 08:10

## 2024-10-14 RX ADMIN — ATORVASTATIN CALCIUM 20 MG: 10 TABLET, FILM COATED ORAL at 09:10

## 2024-10-14 RX ADMIN — PANTOPRAZOLE SODIUM 40 MG: 40 TABLET, DELAYED RELEASE ORAL at 08:10

## 2024-10-14 RX ADMIN — MORPHINE SULFATE 2 MG: 4 INJECTION, SOLUTION INTRAMUSCULAR; INTRAVENOUS at 04:10

## 2024-10-14 RX ADMIN — OXYCODONE AND ACETAMINOPHEN 1 TABLET: 10; 325 TABLET ORAL at 01:10

## 2024-10-14 RX ADMIN — FLUOXETINE HYDROCHLORIDE 20 MG: 20 CAPSULE ORAL at 08:10

## 2024-10-14 RX ADMIN — FENOFIBRATE 48 MG: 48 TABLET, FILM COATED ORAL at 08:10

## 2024-10-14 RX ADMIN — TOBRAMYCIN SULFATE 445 MG: 40 INJECTION, SOLUTION INTRAMUSCULAR; INTRAVENOUS at 08:10

## 2024-10-14 RX ADMIN — ENOXAPARIN SODIUM 70 MG: 80 INJECTION SUBCUTANEOUS at 04:10

## 2024-10-14 RX ADMIN — CARVEDILOL 25 MG: 12.5 TABLET, FILM COATED ORAL at 09:10

## 2024-10-14 RX ADMIN — CIPROFLOXACIN 400 MG: 2 INJECTION, SOLUTION INTRAVENOUS at 10:10

## 2024-10-14 RX ADMIN — MORPHINE SULFATE 2 MG: 4 INJECTION, SOLUTION INTRAMUSCULAR; INTRAVENOUS at 01:10

## 2024-10-14 RX ADMIN — OXYCODONE AND ACETAMINOPHEN 1 TABLET: 10; 325 TABLET ORAL at 08:10

## 2024-10-14 RX ADMIN — GABAPENTIN 400 MG: 400 CAPSULE ORAL at 05:10

## 2024-10-14 RX ADMIN — GABAPENTIN 400 MG: 400 CAPSULE ORAL at 01:10

## 2024-10-14 RX ADMIN — SODIUM CHLORIDE, PRESERVATIVE FREE 10 ML: 5 INJECTION INTRAVENOUS at 11:10

## 2024-10-14 RX ADMIN — OXYCODONE AND ACETAMINOPHEN 1 TABLET: 10; 325 TABLET ORAL at 03:10

## 2024-10-14 RX ADMIN — ENOXAPARIN SODIUM 70 MG: 80 INJECTION SUBCUTANEOUS at 05:10

## 2024-10-14 RX ADMIN — MEROPENEM 1 G: 1 INJECTION, POWDER, FOR SOLUTION INTRAVENOUS at 01:10

## 2024-10-14 RX ADMIN — METHOCARBAMOL 750 MG: 750 TABLET ORAL at 09:10

## 2024-10-14 RX ADMIN — OXYBUTYNIN CHLORIDE 5 MG: 5 TABLET ORAL at 02:10

## 2024-10-14 RX ADMIN — SODIUM CHLORIDE, PRESERVATIVE FREE 10 ML: 5 INJECTION INTRAVENOUS at 01:10

## 2024-10-14 RX ADMIN — Medication 6 MG: at 09:10

## 2024-10-14 RX ADMIN — GABAPENTIN 400 MG: 400 CAPSULE ORAL at 11:10

## 2024-10-14 RX ADMIN — SODIUM CHLORIDE, PRESERVATIVE FREE 10 ML: 5 INJECTION INTRAVENOUS at 12:10

## 2024-10-14 RX ADMIN — DOXYCYCLINE HYCLATE 100 MG: 100 TABLET, COATED ORAL at 09:10

## 2024-10-14 RX ADMIN — AMPICILLIN SODIUM AND SULBACTAM SODIUM 3 G: 2; 1 INJECTION, POWDER, FOR SOLUTION INTRAMUSCULAR; INTRAVENOUS at 09:10

## 2024-10-14 RX ADMIN — MEROPENEM 1 G: 1 INJECTION, POWDER, FOR SOLUTION INTRAVENOUS at 03:10

## 2024-10-14 RX ADMIN — OXYBUTYNIN CHLORIDE 5 MG: 5 TABLET ORAL at 09:10

## 2024-10-14 RX ADMIN — DOXYCYCLINE HYCLATE 100 MG: 100 TABLET, COATED ORAL at 08:10

## 2024-10-14 RX ADMIN — TRAZODONE HYDROCHLORIDE 200 MG: 100 TABLET ORAL at 09:10

## 2024-10-14 NOTE — PLAN OF CARE
Problem: Adult Inpatient Plan of Care  Goal: Optimal Comfort and Wellbeing  Outcome: Progressing     Problem: Diabetes Comorbidity  Goal: Blood Glucose Level Within Targeted Range  Outcome: Progressing     Problem: Infection  Goal: Absence of Infection Signs and Symptoms  Outcome: Progressing     Problem: Pain Acute  Goal: Optimal Pain Control and Function  Outcome: Progressing

## 2024-10-14 NOTE — PROGRESS NOTES
"Pharmacokinetic Follow Up: Tobramycin    Assessment of levels:   Tobramycin levels: The trough concentration was exceeding target range of < 1 mcg/mL    Regimen Plan:   Change dosing regimen to: Tobramycin 445 mg IV every 36 hours  Check level on 10/17 at 0830    Drug levels (last 3 results):  No results for input(s): "AMIKACINPEAK", "AMIKACINTROU", "AMIKACINRAND", "AMIKACIN" in the last 72 hours.    No results for input(s): "GENTAMICIN", "GENTPEAK", "GENTTROUGH", "GENT10", "GENT12", "GENT8", "GENTRANDOM" in the last 72 hours.    Recent Labs   Lab Result Units 10/13/24  2034   Tobramycin Trough ug/ml 1.2       Aminoglycoside Administrations:  aminoglycosides given in last 96 hours                     tobramycin (NEBCIN) 445 mg in D5W 100 mL IVPB (mg) 445 mg New Bag 10/12/24 2137     445 mg New Bag 10/11/24 2009     445 mg New Bag 10/10/24 2123                    Pharmacy will continue to monitor.    Please contact pharmacy at extension [phone] with any questions regarding this assessment.    Thank you for the consult,   Malgorzata Seymour      Patient brief summary:  Bianca Khan is a 59 y.o. male initiated on aminoglycoside therapy for treatment of bone/joint infection    Drug Allergies:   Review of patient's allergies indicates:   Allergen Reactions    Baclofen Itching and Anxiety       Weights:   Wt Readings from Last 1 Encounters:   10/09/24 73 kg (160 lb 15 oz)     Ideal body weight: 73 kg (160 lb 15 oz)    Renal Function:   Estimated Creatinine Clearance: 84.5 mL/min (based on SCr of 0.96 mg/dL).,     Dialysis Method (if applicable):  N/A    CBC (last 72 hours):  Recent Labs   Lab Result Units 10/11/24  0636 10/11/24  1204 10/12/24  1048   WBC x10(3)/mcL 9.43  --  9.97   Hgb g/dL 7.9* 7.1* 11.5*   Hct % 26.0* 23.0* 35.3*   Platelet x10(3)/mcL 514*  --  484*   Mono % % 8.7  --   --    Eos % % 0.0  --   --    Basophil % % 0.1  --   --        Metabolic Panel (last 72 hours):  Recent Labs   Lab Result Units " 10/11/24  0636 10/12/24  1048 10/13/24  0913   Sodium mmol/L 140 138 140   Potassium mmol/L 4.5 5.2* 4.8   Chloride mmol/L 108* 105 103   CO2 mmol/L 25 24 27   Glucose mg/dL 167* 114 117*   Blood Urea Nitrogen mg/dL 14.2 16.3 18.9   Creatinine mg/dL 1.02 1.06 0.96   Albumin g/dL 2.3*  --   --    Bilirubin Total mg/dL 0.2  --   --    ALP unit/L 78  --   --    AST unit/L 11  --   --    ALT unit/L 9  --   --    Magnesium Level mg/dL 1.70  --   --    Phosphorus Level mg/dL 3.2  --   --        Microbiologic Results:  Microbiology Results (last 7 days)       Procedure Component Value Units Date/Time    Blood culture #1 **CANNOT BE ORDERED STAT** [[id number]]  (Normal) Collected: 10/08/24 1817    Order Status: Completed Specimen: Blood Updated: 10/13/24 1900     Blood Culture No Growth at 5 days    Blood culture #2 **CANNOT BE ORDERED STAT** [[id number]]  (Normal) Collected: 10/09/24 0521    Order Status: Completed Specimen: Blood from Arm, Right Updated: 10/13/24 1100     Blood Culture No Growth At 96 Hours    Wound Culture [[id number]]  (Abnormal)  (Susceptibility) Collected: 10/09/24 1011    Order Status: Completed Specimen: Wound from Sacral Updated: 10/12/24 1219     Wound Culture Many ACINETOBACTER BAUMANNII     Comment: CRAB (Carbapenem-resistant Acinetobacter baumannii)         Many Escherichia coli ESBL      Moderate Enterococcus faecalis    Anaerobic Culture [[id number]]  (Abnormal) Collected: 10/09/24 1011    Order Status: Completed Specimen: Wound from Sacral Updated: 10/12/24 1123     Anaerobe Culture Bacteroides thetaiotaomicron      Peptoniphilus asaccharolyticus     Comment: Anaerobic sensitivity is not usually indicated except on single isolates from sterile body sites.       Urine culture [[id number]] Collected: 10/08/24 2338    Order Status: Completed Specimen: Urine Updated: 10/11/24 0719     Urine Culture No Growth
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
## 2024-10-14 NOTE — PROGRESS NOTES
[facility] - 5th Floor Med Surg  Wound Care    Patient Name:  Bianca Khan   MRN:  [id number]  Date: 10/14/2024  Diagnosis: Sacral wound    History:     Past Medical History:   Diagnosis Date    Arthritis     Chronic ulcer of ankle 2022    ESBL (extended spectrum beta-lactamase) producing bacteria infection 2024    Frequent UTI 2019    Generalized anxiety disorder 2022    Neurogenic bladder 2022    Osteomyelitis 2022    Paraplegia     Presence of suprapubic catheter 2022    Pure hypercholesterolemia 2022    Retention of urine, unspecified 2019    Spinal cord injury at T1-T6 level 2018       Social History     Socioeconomic History    Marital status:    Tobacco Use    Smoking status: Some Days     Current packs/day: 0.00     Average packs/day: 0.2 packs/day for 41.5 years (10.4 ttl pk-yrs)     Types: Cigars, Cigarettes     Start date: 1982     Last attempt to quit: 2023     Years since quittin.2    Smokeless tobacco: Never   Substance and Sexual Activity    Alcohol use: Not Currently     Alcohol/week: 2.0 standard drinks of alcohol     Types: 2 Cans of beer per week    Drug use: Not Currently     Types: Oxycodone    Sexual activity: Not Currently     Partners: Female     Birth control/protection: None     Social Drivers of Health     Financial Resource Strain: Low Risk  (10/10/2024)    Overall Financial Resource Strain (CARDIA)     Difficulty of Paying Living Expenses: Not hard at all   Food Insecurity: No Food Insecurity (10/10/2024)    Hunger Vital Sign     Worried About Running Out of Food in the Last Year: Never true     Ran Out of Food in the Last Year: Never true   Transportation Needs: No Transportation Needs (10/10/2024)    TRANSPORTATION NEEDS     Transportation : No   Physical Activity: Inactive (2023)    Exercise Vital Sign     Days of Exercise per Week: 0 days     Minutes of Exercise per Session: 0 min    Stress: No Stress Concern Present (10/10/2024)    Citizen of the [place] of Occupational Health - Occupational Stress Questionnaire     Feeling of Stress : Only a little   Housing Stability: Low Risk  (10/10/2024)    Housing Stability Vital Sign     Unable to Pay for Housing in the Last Year: No     Homeless in the Last Year: No       Precautions:     Allergies as of 10/08/2024 - Reviewed 10/08/2024   Allergen Reaction Noted    Baclofen Itching and Anxiety 06/17/2019       WOC Assessment Details/Treatment        10/14/24 1155   WOCN Assessment   Visit Date 10/14/24   Visit Time 1155   Consult Type New;Follow Up   WOCN Speciality Wound   WOCN List wound vac   Wound pressure;surgical   Procedure wound vac   Intervention chart review;assessed;changed;applied;orders   Teaching on-going        Altered Skin Integrity 06/28/23 2230 Right lateral Ankle #6   Date First Assessed/Time First Assessed: 06/28/23 2230   Altered Skin Integrity Present on Admission - Did Patient arrive to the hospital with altered skin?: yes  Side: Right  Orientation: lateral  Location: Ankle  Wound Number: #6  Is this injury dev...   Wound Image    Description of Altered Skin Integrity Full thickness tissue loss. Subcutaneous fat may be visible but bone, tendon or muscle are not exposed   Dressing Appearance Intact;Dried drainage   Drainage Amount Scant   Drainage Characteristics/Odor Serosanguineous   Appearance Pink;Red;Yellow   Tissue loss description Full thickness   Black (%), Wound Tissue Color 0 %   Red (%), Wound Tissue Color 80 %   Yellow (%), Wound Tissue Color 20 %   Periwound Area Intact;Dry   Wound Edges Callused;Defined   Wound Length (cm) 2 cm   Wound Width (cm) 1.3 cm   Wound Depth (cm) 0.2 cm   Wound Volume (cm^3) 0.52 cm^3   Wound Surface Area (cm^2) 2.6 cm^2   Care Cleansed with:;Antimicrobial agent   Dressing Changed;Calcium alginate;Silver;Foam        Altered Skin Integrity 01/09/24 0848 upper Sacral spine   Date First  Assessed/Time First Assessed: 01/09/24 0848   Altered Skin Integrity Present on Admission - Did Patient arrive to the hospital with altered skin?: suspected hospital acquired  Orientation: upper  Location: Sacral spine   Wound Image    Description of Altered Skin Integrity Full thickness tissue loss. Subcutaneous fat may be visible but bone, tendon or muscle are not exposed   Dressing Appearance Intact;Saturated   Drainage Amount Moderate   Drainage Characteristics/Odor Serosanguineous   Appearance Pink;Red;Moist   Tissue loss description Full thickness   Black (%), Wound Tissue Color 0 %   Red (%), Wound Tissue Color 100 %   Yellow (%), Wound Tissue Color 0 %   Periwound Area Intact;Dry   Wound Edges Defined   Wound Length (cm) 5.5 cm   Wound Width (cm) 6.5 cm   Wound Depth (cm) 1 cm   Wound Volume (cm^3) 35.75 cm^3   Wound Surface Area (cm^2) 35.75 cm^2   Care Cleansed with:;Antimicrobial agent  (vashe)   Dressing Applied;Gauze, wet to dry;Absorptive Pad        Wound 05/30/24 0000 Pressure Injury Right Buttocks   Date First Assessed/Time First Assessed: 05/30/24 0000   Primary Wound Type: Pressure Injury  Side: Right  Location: Buttocks  Is this injury device related?: No   Wound Image    Pressure Injury Stage U   Dressing Appearance Saturated   Drainage Amount Moderate   Drainage Characteristics/Odor Serosanguineous   Appearance Pink;Red;Yellow;Moist;Adipose   Tissue loss description Full thickness   Black (%), Wound Tissue Color 0 %   Red (%), Wound Tissue Color 70 %   Yellow (%), Wound Tissue Color 30 %   Periwound Area Intact;Dry   Wound Edges Defined   Wound Length (cm) 6 cm   Wound Width (cm) 5 cm   Wound Depth (cm) 3 cm   Wound Volume (cm^3) 90 cm^3   Wound Surface Area (cm^2) 30 cm^2   Care Cleansed with:;Antimicrobial agent  (vashe)   Dressing Applied;Other (comment)  (NPWT set @125mmHg)   Dressing Change Due 10/17/24        Wound 08/22/24 0800 Other (comment) Left Heel   Date First Assessed/Time First  Assessed: 08/22/24 0800   Primary Wound Type: Other (comment)  Side: Left  Location: Heel   Wound Image     Dressing Appearance Intact;Moist drainage   Drainage Amount Small   Drainage Characteristics/Odor Sanguineous   Appearance Pink;Red;Yellow   Tissue loss description Full thickness   Black (%), Wound Tissue Color 0 %   Red (%), Wound Tissue Color 70 %   Yellow (%), Wound Tissue Color 30 %   Periwound Area Intact;Dry   Wound Edges Callused;Defined;Irregular   Wound Length (cm) 2 cm   Wound Width (cm) 3 cm   Wound Depth (cm) 0.3 cm   Wound Volume (cm^3) 1.8 cm^3   Wound Surface Area (cm^2) 6 cm^2   Care Cleansed with:;Antimicrobial agent  (vashe)   Dressing Applied;Calcium alginate;Silver;Foam  (see orders for new wound dressing needs)        Negative Pressure Wound Therapy  10/10/24 1017   Placement Date/Time: 10/10/24 1017   Location: Buttocks  Additional Comments: SN: [id number]   NPWT Type Vacuum Therapy   Therapy Setting NPWT Continuous therapy   Pressure Setting NPWT 125 mmHg   Therapy Interventions NPWT Canister changed;Seal intact   Sponges Inserted NPWT 1;White;Black     WOCN consulted for wound vac placement to right buttock and follow up for sacral wound, Left heel, and right lateral ankle. Discussed POC w/ nurse Amy. Family at bedside. Had assistance from Miki from wound care team w/ wound vac placement. Explained reason for visit. Wound vac placed to right buttock-cleaned w/ vashe, 1 white foam, 1 black foam tracked to another black foam, seal intact w/ no leakage or blockage detected w/ setting at 125mmHg. Pt. Tolerated application well with no signs of pain or discomfort. Cont. Tx recs in orders for sacral and right lateral ankle. Treatment recommendations for left heel: Clean w/ vashe, dry well, apply Ca+ Ag to red/good tissue wound bed, vashe moistened mesalt to the yellow/slough part of heel, abd pad, kerlix, and secure w/ tape. BID/Prn if soilage. Nursing to cont. Tx recs and  preventative measures. Will order immerse MARY mattress for patient.     10/14/2024
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
## 2024-10-15 LAB
POCT GLUCOSE: 114 MG/DL (ref 70–110)
POCT GLUCOSE: 320 MG/DL (ref 70–110)

## 2024-10-15 PROCEDURE — 94799 UNLISTED PULMONARY SVC/PX: CPT

## 2024-10-15 PROCEDURE — 27000207 HC ISOLATION

## 2024-10-15 PROCEDURE — 25000003 PHARM REV CODE 250: Performed by: NURSE PRACTITIONER

## 2024-10-15 PROCEDURE — 63600175 PHARM REV CODE 636 W HCPCS: Performed by: STUDENT IN AN ORGANIZED HEALTH CARE EDUCATION/TRAINING PROGRAM

## 2024-10-15 PROCEDURE — 25000003 PHARM REV CODE 250: Performed by: STUDENT IN AN ORGANIZED HEALTH CARE EDUCATION/TRAINING PROGRAM

## 2024-10-15 PROCEDURE — 63600175 PHARM REV CODE 636 W HCPCS: Performed by: INTERNAL MEDICINE

## 2024-10-15 PROCEDURE — 99900031 HC PATIENT EDUCATION (STAT)

## 2024-10-15 PROCEDURE — A4216 STERILE WATER/SALINE, 10 ML: HCPCS | Performed by: STUDENT IN AN ORGANIZED HEALTH CARE EDUCATION/TRAINING PROGRAM

## 2024-10-15 PROCEDURE — 25000003 PHARM REV CODE 250: Performed by: INTERNAL MEDICINE

## 2024-10-15 PROCEDURE — 94760 N-INVAS EAR/PLS OXIMETRY 1: CPT

## 2024-10-15 PROCEDURE — 63600175 PHARM REV CODE 636 W HCPCS: Performed by: NURSE PRACTITIONER

## 2024-10-15 PROCEDURE — 0T2BX0Z CHANGE DRAINAGE DEVICE IN BLADDER, EXTERNAL APPROACH: ICD-10-PCS | Performed by: UROLOGY

## 2024-10-15 PROCEDURE — 99900035 HC TECH TIME PER 15 MIN (STAT)

## 2024-10-15 PROCEDURE — 11000001 HC ACUTE MED/SURG PRIVATE ROOM

## 2024-10-15 RX ADMIN — CARVEDILOL 25 MG: 12.5 TABLET, FILM COATED ORAL at 09:10

## 2024-10-15 RX ADMIN — PANTOPRAZOLE SODIUM 40 MG: 40 TABLET, DELAYED RELEASE ORAL at 09:10

## 2024-10-15 RX ADMIN — DOXYCYCLINE HYCLATE 100 MG: 100 TABLET, COATED ORAL at 09:10

## 2024-10-15 RX ADMIN — TRAZODONE HYDROCHLORIDE 200 MG: 100 TABLET ORAL at 08:10

## 2024-10-15 RX ADMIN — GABAPENTIN 400 MG: 400 CAPSULE ORAL at 03:10

## 2024-10-15 RX ADMIN — AMPICILLIN SODIUM AND SULBACTAM SODIUM 3 G: 2; 1 INJECTION, POWDER, FOR SOLUTION INTRAMUSCULAR; INTRAVENOUS at 01:10

## 2024-10-15 RX ADMIN — OXYBUTYNIN CHLORIDE 5 MG: 5 TABLET ORAL at 09:10

## 2024-10-15 RX ADMIN — SODIUM CHLORIDE, PRESERVATIVE FREE 10 ML: 5 INJECTION INTRAVENOUS at 05:10

## 2024-10-15 RX ADMIN — METHOCARBAMOL 750 MG: 750 TABLET ORAL at 03:10

## 2024-10-15 RX ADMIN — GABAPENTIN 400 MG: 400 CAPSULE ORAL at 05:10

## 2024-10-15 RX ADMIN — FENOFIBRATE 48 MG: 48 TABLET, FILM COATED ORAL at 09:10

## 2024-10-15 RX ADMIN — METHOCARBAMOL 750 MG: 750 TABLET ORAL at 09:10

## 2024-10-15 RX ADMIN — AMPICILLIN SODIUM AND SULBACTAM SODIUM 3 G: 2; 1 INJECTION, POWDER, FOR SOLUTION INTRAMUSCULAR; INTRAVENOUS at 03:10

## 2024-10-15 RX ADMIN — OXYCODONE AND ACETAMINOPHEN 1 TABLET: 10; 325 TABLET ORAL at 06:10

## 2024-10-15 RX ADMIN — ENOXAPARIN SODIUM 70 MG: 80 INJECTION SUBCUTANEOUS at 05:10

## 2024-10-15 RX ADMIN — SENNOSIDES AND DOCUSATE SODIUM 1 TABLET: 50; 8.6 TABLET ORAL at 09:10

## 2024-10-15 RX ADMIN — CIPROFLOXACIN 400 MG: 2 INJECTION, SOLUTION INTRAVENOUS at 09:10

## 2024-10-15 RX ADMIN — ATORVASTATIN CALCIUM 20 MG: 10 TABLET, FILM COATED ORAL at 08:10

## 2024-10-15 RX ADMIN — DOXYCYCLINE HYCLATE 100 MG: 100 TABLET, COATED ORAL at 08:10

## 2024-10-15 RX ADMIN — OXYCODONE AND ACETAMINOPHEN 1 TABLET: 10; 325 TABLET ORAL at 02:10

## 2024-10-15 RX ADMIN — MORPHINE SULFATE 2 MG: 4 INJECTION, SOLUTION INTRAMUSCULAR; INTRAVENOUS at 08:10

## 2024-10-15 RX ADMIN — ENOXAPARIN SODIUM 70 MG: 80 INJECTION SUBCUTANEOUS at 06:10

## 2024-10-15 RX ADMIN — FLUOXETINE HYDROCHLORIDE 20 MG: 20 CAPSULE ORAL at 09:10

## 2024-10-15 RX ADMIN — AMPICILLIN SODIUM AND SULBACTAM SODIUM 3 G: 2; 1 INJECTION, POWDER, FOR SOLUTION INTRAMUSCULAR; INTRAVENOUS at 09:10

## 2024-10-15 RX ADMIN — CARVEDILOL 25 MG: 12.5 TABLET, FILM COATED ORAL at 08:10

## 2024-10-15 RX ADMIN — OXYCODONE AND ACETAMINOPHEN 1 TABLET: 10; 325 TABLET ORAL at 09:10

## 2024-10-15 RX ADMIN — AMPICILLIN SODIUM AND SULBACTAM SODIUM 3 G: 2; 1 INJECTION, POWDER, FOR SOLUTION INTRAMUSCULAR; INTRAVENOUS at 08:10

## 2024-10-15 RX ADMIN — SODIUM CHLORIDE, PRESERVATIVE FREE 10 ML: 5 INJECTION INTRAVENOUS at 08:10

## 2024-10-15 RX ADMIN — AMPICILLIN SODIUM AND SULBACTAM SODIUM 3 G: 2; 1 INJECTION, POWDER, FOR SOLUTION INTRAMUSCULAR; INTRAVENOUS at 05:10

## 2024-10-15 RX ADMIN — AMPICILLIN SODIUM AND SULBACTAM SODIUM 3 G: 2; 1 INJECTION, POWDER, FOR SOLUTION INTRAMUSCULAR; INTRAVENOUS at 06:10

## 2024-10-15 RX ADMIN — OXYCODONE AND ACETAMINOPHEN 1 TABLET: 10; 325 TABLET ORAL at 03:10

## 2024-10-15 RX ADMIN — OXYBUTYNIN CHLORIDE 5 MG: 5 TABLET ORAL at 08:10

## 2024-10-15 RX ADMIN — OXYBUTYNIN CHLORIDE 5 MG: 5 TABLET ORAL at 03:10

## 2024-10-15 RX ADMIN — GABAPENTIN 400 MG: 400 CAPSULE ORAL at 08:10

## 2024-10-15 NOTE — PROGRESS NOTES
UROLOGY  PROGRESS  NOTE    Bianca Khan 1965  [id number]  10/15/2024    Patient resting in bed  Leakage around SPT worse over last few days  No improvement with increasing ditropan or adding levsin    VSS, afebrile   1600 mL urine output overnight   No labs today     Urine culture with no growth    Intake/Output:  No intake/output data recorded.  I/O last 3 completed shifts:  In: 360 [P.O.:360]  Out: 3760 [Urine:3760]     Exam:    NAD  Card: RRR  Resp: unlabored  Abd: soft, NTND  : cloudy yellow urine draining to  bag; SPT replaced without difficulty, 22fr catheter, 25cc in balloon  Extremity: BLE contractures    Recent Results (from the past 24 hours)   POCT glucose    Collection Time: 10/14/24  4:14 PM   Result Value Ref Range    POCT Glucose 82 70 - 110 mg/dL   POCT glucose    Collection Time: 10/14/24  9:21 PM   Result Value Ref Range    POCT Glucose 119 (H) 70 - 110 mg/dL   POCT glucose    Collection Time: 10/15/24  5:36 AM   Result Value Ref Range    POCT Glucose 320 (H) 70 - 110 mg/dL   POCT glucose    Collection Time: 10/15/24  6:13 AM   Result Value Ref Range    POCT Glucose 114 (H) 70 - 110 mg/dL       Assessment:  Paraplegia with neurogenic bladder and SPT admitted for sepsis s/t infected sacral wound; having leakage around SPT   -increased Ditropan and Levsin started yesterday without improvement  -SPT exchanged today    Plan:  -SPT exchanged without difficulty. 10cc in prior catheter, added 25cc patient reports increasing balloon size has helped in past  -continue ditropan, levsin  -Will check in on patient tomorrow.       Kristin Quinn NP

## 2024-10-15 NOTE — PROGRESS NOTES
Infectious Diseases Progress Note  58 y/o male with past medical history of T-spine cord injury with paraplegia, diabetes, HTN, hepatitis-C, chronic MRSA L ankle wound/osteomyelitis, was on suppressive doxycycline and R knee infection/septic arthritis and osteomyelitis with GBS/Enterococcus and Ecoli isolates who presented to ER on 10/8 with increased generalized pain, increased foul smelling drainage from sacral wound with fever and chills. He was noted to be hypotensive per EMS. On admit he was afebrile without leukocytosis. ESR 98 and .80. MRSA PCR (-). U/A with 21-50 WBC, 0-5 RBC, 75 LE, no bacteria, negative nitrites. Urine culture negative. Blood cultures negative thus far. Sacral wound culture with GNR. CT pelvis with contrast showed worsening inflammatory change of the sacral decubitus ulcers with gas now identified inferior to the right pubic ramus, stool noted throughout the colon as may be seen with constipation, pyelonephritis is not excluded. Seen by Surgery and underwent debridement of sacral wound. Surgical wound cultures revealed many CR Acinetobacter, many ESBL Ecoli, moderate Enterococcus, Bacteroides and Peptoniphilus isolates  He is currently on Merrem, Tobramycin and Diflucan. On chronic oral suppression with Doxycycline    Subjective:  Lying in bed, no acute distress. No new complaints voiced. Afebrile. Wife present    Review of Systems   Constitutional:  Positive for malaise/fatigue.   HENT: Negative.     Eyes: Negative.    Respiratory: Negative.     Cardiovascular: Negative.    Gastrointestinal: Negative.    Musculoskeletal: Negative.    Skin:         Sacral wound   Neurological:  Positive for focal weakness and weakness.   All other systems reviewed and are negative.      Review of patient's allergies indicates:   Allergen Reactions    Baclofen Itching and Anxiety       Past Medical History:   Diagnosis Date    Arthritis     Chronic ulcer of ankle 05/26/2022    ESBL (extended spectrum  beta-lactamase) producing bacteria infection 08/30/2024    Frequent UTI 07/02/2019    Generalized anxiety disorder 05/26/2022    Neurogenic bladder 05/26/2022    Osteomyelitis 05/26/2022    Paraplegia     Presence of suprapubic catheter 05/26/2022    Pure hypercholesterolemia 05/26/2022    Retention of urine, unspecified 08/09/2019    Spinal cord injury at T1-T6 level 04/20/2018       Past Surgical History:   Procedure Laterality Date    APPLICATION OF WOUND VACUUM-ASSISTED CLOSURE DEVICE N/A 10/10/2024    Procedure: APPLICATION, WOUND VAC;  Surgeon: Rob Sinclair MD;  Location: [facility];  Service: General;  Laterality: N/A;    EGD, WITH CLOSED BIOPSY N/A 8/29/2024    Procedure: EGD, WITH CLOSED BIOPSY;  Surgeon: Mikal Segovia MD;  Location: [facility] ENDOSCOPY;  Service: Gastroenterology;  Laterality: N/A;    ESOPHAGOGASTRODUODENOSCOPY N/A 8/29/2024    Procedure: EGD;  Surgeon: Mikal Segovia MD;  Location: [facility] ENDOSCOPY;  Service: Gastroenterology;  Laterality: N/A;    FRACTURE SURGERY  2021    INCISION AND DRAINAGE, LOWER EXTREMITY Right 06/29/2023    Procedure: INCISION AND DRAINAGE, LOWER EXTREMITY;  Surgeon: Prabhu Shen DO;  Location: [facility] OR;  Service: Orthopedics;  Laterality: Right;  supine bone foam wash stuff cultures    INCISION AND DRAINAGE, LOWER EXTREMITY Right 11/30/2023    Procedure: INCISION AND DRAINAGE, LOWER EXTREMITY;  Surgeon: Prabhu Shen DO;  Location: [facility] OR;  Service: Orthopedics;  Laterality: Right;  supine any table bone foam wash stuff possible wound vac    INCISION AND DRAINAGE, LOWER EXTREMITY Right 12/1/2023    Procedure: INCISION AND DRAINAGE, LOWER EXTREMITY;  Surgeon: Prabhu Shen DO;  Location: [facility] OR;  Service: Orthopedics;  Laterality: Right;  supine bone foam vascular bed removal of distal femoral plate, wash stuff, possible AKA    INSERTION OF INTRAMEDULLARY MARISA Right 08/10/2022    Procedure: INSERTION, INTRAMEDULLARY MARISA RIGHT TIBIA;  Surgeon: Jorge WALTON  MD Esteban;  Location: [facility] OR;  Service: Orthopedics;  Laterality: Right;    JOINT REPLACEMENT      Ankel    PRESSURE ULCER DEBRIDEMENT N/A 10/10/2024    Procedure: DEBRIDEMENT, PRESSURE ULCER;  Surgeon: Rob Sinclair MD;  Location: [facility] OR;  Service: General;  Laterality: N/A;  sacral wound, R buttock wound    REMOVAL OF HARDWARE FROM LOWER EXTREMITY Right 2023    Procedure: REMOVAL, HARDWARE, LOWER EXTREMITY;  Surgeon: Prabhu Shen DO;  Location: [facility] OR;  Service: Orthopedics;  Laterality: Right;    SPINE SURGERY  2018       Social History     Socioeconomic History    Marital status:    Tobacco Use    Smoking status: Some Days     Current packs/day: 0.00     Average packs/day: 0.2 packs/day for 41.5 years (10.4 ttl pk-yrs)     Types: Cigars, Cigarettes     Start date: 1982     Last attempt to quit: 2023     Years since quittin.2    Smokeless tobacco: Never   Substance and Sexual Activity    Alcohol use: Not Currently     Alcohol/week: 2.0 standard drinks of alcohol     Types: 2 Cans of beer per week    Drug use: Not Currently     Types: Oxycodone    Sexual activity: Not Currently     Partners: Female     Birth control/protection: None     Social Drivers of Health     Financial Resource Strain: Low Risk  (10/10/2024)    Overall Financial Resource Strain (CARDIA)     Difficulty of Paying Living Expenses: Not hard at all   Food Insecurity: No Food Insecurity (10/10/2024)    Hunger Vital Sign     Worried About Running Out of Food in the Last Year: Never true     Ran Out of Food in the Last Year: Never true   Transportation Needs: No Transportation Needs (10/10/2024)    TRANSPORTATION NEEDS     Transportation : No   Physical Activity: Inactive (2023)    Exercise Vital Sign     Days of Exercise per Week: 0 days     Minutes of Exercise per Session: 0 min   Stress: No Stress Concern Present (10/10/2024)    [employer] - Occupational Stress  Questionnaire     Feeling of Stress : Only a little   Housing Stability: Low Risk  (10/10/2024)    Housing Stability Vital Sign     Unable to Pay for Housing in the Last Year: No     Homeless in the Last Year: No         Scheduled Meds:   ampicillin-sulbactam  3 g Intravenous Q4H    atorvastatin  20 mg Oral Nightly    carvediloL  25 mg Oral BID    ciprofloxacin  400 mg Intravenous Q12H    doxycycline  100 mg Oral Q12H    enoxparin  1 mg/kg Subcutaneous Q12H (treatment, non-standard time)    fenofibrate  48 mg Oral Daily    FLUoxetine  20 mg Per G Tube Daily    gabapentin  400 mg Oral Q8H    oxybutynin  5 mg Oral TID    pantoprazole  40 mg Oral Daily    senna-docusate 8.6-50 mg  1 tablet Oral BID    sodium chloride 0.9%  10 mL Intravenous Q6H     Continuous Infusions:  PRN Meds:  Current Facility-Administered Medications:     0.9%  NaCl infusion (for blood administration), , Intravenous, Q24H PRN    acetaminophen, 650 mg, Oral, Q4H PRN    aluminum-magnesium hydroxide-simethicone, 30 mL, Oral, QID PRN    dextrose 10%, 12.5 g, Intravenous, PRN    dextrose 10%, 25 g, Intravenous, PRN    diphenhydrAMINE, 50 mg, Intravenous, Q6H PRN    glucagon (human recombinant), 1 mg, Intramuscular, PRN    glucose, 16 g, Oral, PRN    glucose, 24 g, Oral, PRN    hyoscyamine, 0.125 mg, Sublingual, Q4H PRN    insulin aspart U-100, 1-10 Units, Subcutaneous, QID (AC + HS) PRN    melatonin, 6 mg, Oral, Nightly PRN    methocarbamoL, 750 mg, Oral, TID PRN    morphine, 2 mg, Intravenous, BID PRN    ondansetron, 4 mg, Intravenous, Q4H PRN    oxyCODONE-acetaminophen, 1 tablet, Oral, Q4H PRN    polyethylene glycol, 17 g, Oral, BID PRN    prochlorperazine, 5 mg, Intravenous, Q6H PRN    sodium chloride 0.9%, 10 mL, Intravenous, PRN    Flushing PICC/Midline Protocol, , , Until Discontinued **AND** sodium chloride 0.9%, 10 mL, Intravenous, Q6H **AND** sodium chloride 0.9%, 10 mL, Intravenous, PRN    tobramycin - pharmacy to dose, , Intravenous,  "pharmacy to manage frequency    traZODone, 200 mg, Oral, Nightly PRN    Objective:  BP (!) 144/78   Pulse 90   Temp 98.3 °F (36.8 °C) (Oral)   Resp 18   Ht 5' 10" (1.778 m)   Wt 73 kg (160 lb 15 oz)   SpO2 97%   BMI 23.09 kg/m²     Physical Exam:   Physical Exam  Vitals reviewed.   HENT:      Head: Normocephalic.   Cardiovascular:      Rate and Rhythm: Normal rate and regular rhythm.   Pulmonary:      Effort: Pulmonary effort is normal. No respiratory distress.      Breath sounds: Normal breath sounds. No wheezing.   Abdominal:      General: Bowel sounds are normal. There is no distension.      Palpations: Abdomen is soft.      Tenderness: There is no abdominal tenderness.   Genitourinary:     Comments: Suprapubic catheter  Musculoskeletal:      Cervical back: Normal range of motion.      Comments: Paraplegia   Skin:     Findings: No rash.      Comments: Wounds dressed   Neurological:      Mental Status: He is alert and oriented to person, place, and time.   Psychiatric:         Mood and Affect: Mood normal.         Behavior: Behavior normal.     Imaging    Lab Review   Recent Results (from the past 24 hours)   POCT glucose    Collection Time: 10/14/24  5:28 AM   Result Value Ref Range    POCT Glucose 116 (H) 70 - 110 mg/dL   POCT glucose    Collection Time: 10/14/24 10:48 AM   Result Value Ref Range    POCT Glucose 102 70 - 110 mg/dL   POCT glucose    Collection Time: 10/14/24  4:14 PM   Result Value Ref Range    POCT Glucose 82 70 - 110 mg/dL       Assessment/Plan:  Sepsis  Sacral wound infection - CR Acinetobacter / ESBL Ecoli / Enterococcus / Bacteroides / Peptoniphilus isolates  ANTON  Pyelonephritis per CT   Constipation  Paraplegia  Neurogenic bladder with suprapubic catheter  DM Type II  MRSA L ankle osteomyelitis with retained hardware  Anemia    -Will D/C Merrem #7, Tobramycin #7 and Diflucan #7. Place on Unasyn #1 and Cipro #1  -Plan a 14 day course  -Afebrile without leukocytosis  -CT pelvis with " contrast showed worsening inflammatory change of sacral ulcer with gas inferior to the R pubic ramus  -Seen by Surgery, inputs noted  -S/P debridement of sacral wound on 10/10  -Sacral wound cultures revealing CR Acinetobacter, ESBL Ecoli, Enterococcus, Bacteroides, Peptoniphilus isolates  -Continue wound care  -Blood cultures negative   -Discussed with patient, wife and nursing
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
## 2024-10-15 NOTE — PLAN OF CARE
Problem: Adult Inpatient Plan of Care  Goal: Optimal Comfort and Wellbeing  Outcome: Progressing     Problem: Diabetes Comorbidity  Goal: Blood Glucose Level Within Targeted Range  Outcome: Progressing     Problem: Infection  Goal: Absence of Infection Signs and Symptoms  Outcome: Progressing

## 2024-10-15 NOTE — PROCEDURES
"Bianca Khan is a 59 y.o. male patient.    Temp: 97.4 °F (36.3 °C) (10/15/24 1100)  Pulse: 79 (10/15/24 1100)  Resp: 18 (10/15/24 1100)  BP: 109/78 (10/15/24 1100)  SpO2: 97 % (10/15/24 1100)  Weight: 73 kg (160 lb 15 oz) (10/09/24 0430)  Height: 5' 10" (177.8 cm) (10/09/24 0430)       Bladder Cath    Date/Time: 10/15/2024 2:35 PM  Location procedure was performed: [facility]    Performed by: Kristin Quinn AGACNP-BC  Authorized by: Kristin Quinn AGACNP-BC  Pre-operative diagnosis: neurogenic bladder  Post-operative diagnosis: neurogenic bladder  Indications: catheter change and neurogenic bladder  Local anesthesia used: no    Anesthesia:  Local anesthesia used: no    Patient sedated: no  Preparation: Patient was prepped and draped in the usual sterile fashion.  Catheter insertion: indwelling  Catheter type: Stover (suprapubic)  Catheter size: 22 Fr  Complicated insertion: no  Altered anatomy: no  Bladder irrigation: no  Number of attempts: 1  Urine characteristics: clear and yellow  Complications: No  Patient tolerance: Patient tolerated the procedure well with no immediate complications          10/15/2024    "

## 2024-10-16 LAB
ANION GAP SERPL CALC-SCNC: 8 MEQ/L
BASOPHILS # BLD AUTO: 0.02 X10(3)/MCL
BASOPHILS NFR BLD AUTO: 0.2 %
BUN SERPL-MCNC: 12.4 MG/DL (ref 8.4–25.7)
CALCIUM SERPL-MCNC: 8.4 MG/DL (ref 8.8–10)
CHLORIDE SERPL-SCNC: 106 MMOL/L (ref 98–107)
CO2 SERPL-SCNC: 26 MMOL/L (ref 23–31)
CREAT SERPL-MCNC: 0.9 MG/DL (ref 0.73–1.18)
CREAT/UREA NIT SERPL: 14
EOSINOPHIL # BLD AUTO: 0.1 X10(3)/MCL (ref 0–0.9)
EOSINOPHIL NFR BLD AUTO: 1 %
ERYTHROCYTE [DISTWIDTH] IN BLOOD BY AUTOMATED COUNT: 17.7 % (ref 11.5–17)
GFR SERPLBLD CREATININE-BSD FMLA CKD-EPI: >60 ML/MIN/1.73/M2
GLUCOSE SERPL-MCNC: 114 MG/DL (ref 82–115)
HCT VFR BLD AUTO: 32.9 % (ref 42–52)
HGB BLD-MCNC: 10.4 G/DL (ref 14–18)
IMM GRANULOCYTES # BLD AUTO: 0.05 X10(3)/MCL (ref 0–0.04)
IMM GRANULOCYTES NFR BLD AUTO: 0.5 %
LYMPHOCYTES # BLD AUTO: 3.06 X10(3)/MCL (ref 0.6–4.6)
LYMPHOCYTES NFR BLD AUTO: 31 %
MAGNESIUM SERPL-MCNC: 1.7 MG/DL (ref 1.6–2.6)
MCH RBC QN AUTO: 27.1 PG (ref 27–31)
MCHC RBC AUTO-ENTMCNC: 31.6 G/DL (ref 33–36)
MCV RBC AUTO: 85.7 FL (ref 80–94)
MONOCYTES # BLD AUTO: 0.81 X10(3)/MCL (ref 0.1–1.3)
MONOCYTES NFR BLD AUTO: 8.2 %
NEUTROPHILS # BLD AUTO: 5.83 X10(3)/MCL (ref 2.1–9.2)
NEUTROPHILS NFR BLD AUTO: 59.1 %
NRBC BLD AUTO-RTO: 0.2 %
PLATELET # BLD AUTO: 474 X10(3)/MCL (ref 130–400)
PMV BLD AUTO: 8.8 FL (ref 7.4–10.4)
POCT GLUCOSE: 122 MG/DL (ref 70–110)
POCT GLUCOSE: 164 MG/DL (ref 70–110)
POCT GLUCOSE: 165 MG/DL (ref 70–110)
POCT GLUCOSE: 260 MG/DL (ref 70–110)
POCT GLUCOSE: 73 MG/DL (ref 70–110)
POTASSIUM SERPL-SCNC: 4.6 MMOL/L (ref 3.5–5.1)
RBC # BLD AUTO: 3.84 X10(6)/MCL (ref 4.7–6.1)
SODIUM SERPL-SCNC: 140 MMOL/L (ref 136–145)
WBC # BLD AUTO: 9.87 X10(3)/MCL (ref 4.5–11.5)

## 2024-10-16 PROCEDURE — 27000207 HC ISOLATION

## 2024-10-16 PROCEDURE — 83735 ASSAY OF MAGNESIUM: CPT | Performed by: INTERNAL MEDICINE

## 2024-10-16 PROCEDURE — 94799 UNLISTED PULMONARY SVC/PX: CPT

## 2024-10-16 PROCEDURE — 63600175 PHARM REV CODE 636 W HCPCS: Performed by: STUDENT IN AN ORGANIZED HEALTH CARE EDUCATION/TRAINING PROGRAM

## 2024-10-16 PROCEDURE — A4216 STERILE WATER/SALINE, 10 ML: HCPCS | Performed by: STUDENT IN AN ORGANIZED HEALTH CARE EDUCATION/TRAINING PROGRAM

## 2024-10-16 PROCEDURE — 25000003 PHARM REV CODE 250: Performed by: INTERNAL MEDICINE

## 2024-10-16 PROCEDURE — 80048 BASIC METABOLIC PNL TOTAL CA: CPT | Performed by: INTERNAL MEDICINE

## 2024-10-16 PROCEDURE — 25000003 PHARM REV CODE 250: Performed by: NURSE PRACTITIONER

## 2024-10-16 PROCEDURE — 36415 COLL VENOUS BLD VENIPUNCTURE: CPT | Performed by: INTERNAL MEDICINE

## 2024-10-16 PROCEDURE — 63600175 PHARM REV CODE 636 W HCPCS: Performed by: NURSE PRACTITIONER

## 2024-10-16 PROCEDURE — 63600175 PHARM REV CODE 636 W HCPCS

## 2024-10-16 PROCEDURE — 25000003 PHARM REV CODE 250: Performed by: STUDENT IN AN ORGANIZED HEALTH CARE EDUCATION/TRAINING PROGRAM

## 2024-10-16 PROCEDURE — 85025 COMPLETE CBC W/AUTO DIFF WBC: CPT | Performed by: INTERNAL MEDICINE

## 2024-10-16 PROCEDURE — 11000001 HC ACUTE MED/SURG PRIVATE ROOM

## 2024-10-16 RX ORDER — CLONIDINE HYDROCHLORIDE 0.1 MG/1
0.1 TABLET ORAL 3 TIMES DAILY
Status: DISCONTINUED | OUTPATIENT
Start: 2024-10-16 | End: 2024-10-17

## 2024-10-16 RX ORDER — HYDROXYZINE HYDROCHLORIDE 25 MG/1
50 TABLET, FILM COATED ORAL ONCE
Status: COMPLETED | OUTPATIENT
Start: 2024-10-16 | End: 2024-10-16

## 2024-10-16 RX ORDER — HYDRALAZINE HYDROCHLORIDE 50 MG/1
100 TABLET, FILM COATED ORAL 3 TIMES DAILY
Status: DISCONTINUED | OUTPATIENT
Start: 2024-10-16 | End: 2024-10-17

## 2024-10-16 RX ORDER — NALOXONE HCL 0.4 MG/ML
VIAL (ML) INJECTION
Status: COMPLETED
Start: 2024-10-16 | End: 2024-10-16

## 2024-10-16 RX ORDER — LISINOPRIL 20 MG/1
20 TABLET ORAL DAILY
Status: DISCONTINUED | OUTPATIENT
Start: 2024-10-16 | End: 2024-10-17

## 2024-10-16 RX ADMIN — SODIUM CHLORIDE, PRESERVATIVE FREE 10 ML: 5 INJECTION INTRAVENOUS at 05:10

## 2024-10-16 RX ADMIN — AMPICILLIN SODIUM AND SULBACTAM SODIUM 3 G: 2; 1 INJECTION, POWDER, FOR SOLUTION INTRAMUSCULAR; INTRAVENOUS at 12:10

## 2024-10-16 RX ADMIN — OXYCODONE AND ACETAMINOPHEN 1 TABLET: 10; 325 TABLET ORAL at 02:10

## 2024-10-16 RX ADMIN — ALUMINUM HYDROXIDE, MAGNESIUM HYDROXIDE, AND SIMETHICONE 30 ML: 1200; 120; 1200 SUSPENSION ORAL at 08:10

## 2024-10-16 RX ADMIN — HYDRALAZINE HYDROCHLORIDE 100 MG: 50 TABLET ORAL at 07:10

## 2024-10-16 RX ADMIN — SODIUM CHLORIDE, PRESERVATIVE FREE 10 ML: 5 INJECTION INTRAVENOUS at 12:10

## 2024-10-16 RX ADMIN — FLUOXETINE HYDROCHLORIDE 20 MG: 20 CAPSULE ORAL at 09:10

## 2024-10-16 RX ADMIN — GABAPENTIN 400 MG: 400 CAPSULE ORAL at 05:10

## 2024-10-16 RX ADMIN — SODIUM CHLORIDE 500 ML: 900 INJECTION INTRAVENOUS at 10:10

## 2024-10-16 RX ADMIN — MORPHINE SULFATE 2 MG: 4 INJECTION, SOLUTION INTRAMUSCULAR; INTRAVENOUS at 07:10

## 2024-10-16 RX ADMIN — SODIUM CHLORIDE, PRESERVATIVE FREE 10 ML: 5 INJECTION INTRAVENOUS at 04:10

## 2024-10-16 RX ADMIN — HYDRALAZINE HYDROCHLORIDE 100 MG: 50 TABLET ORAL at 05:10

## 2024-10-16 RX ADMIN — DOXYCYCLINE HYCLATE 100 MG: 100 TABLET, COATED ORAL at 09:10

## 2024-10-16 RX ADMIN — DOXYCYCLINE HYCLATE 100 MG: 100 TABLET, COATED ORAL at 07:10

## 2024-10-16 RX ADMIN — METHOCARBAMOL 750 MG: 750 TABLET ORAL at 10:10

## 2024-10-16 RX ADMIN — ENOXAPARIN SODIUM 70 MG: 80 INJECTION SUBCUTANEOUS at 05:10

## 2024-10-16 RX ADMIN — OXYBUTYNIN CHLORIDE 5 MG: 5 TABLET ORAL at 07:10

## 2024-10-16 RX ADMIN — OXYBUTYNIN CHLORIDE 5 MG: 5 TABLET ORAL at 09:10

## 2024-10-16 RX ADMIN — MORPHINE SULFATE 2 MG: 4 INJECTION, SOLUTION INTRAMUSCULAR; INTRAVENOUS at 05:10

## 2024-10-16 RX ADMIN — GABAPENTIN 400 MG: 400 CAPSULE ORAL at 01:10

## 2024-10-16 RX ADMIN — CARVEDILOL 25 MG: 12.5 TABLET, FILM COATED ORAL at 07:10

## 2024-10-16 RX ADMIN — CARVEDILOL 25 MG: 12.5 TABLET, FILM COATED ORAL at 09:10

## 2024-10-16 RX ADMIN — FENOFIBRATE 48 MG: 48 TABLET, FILM COATED ORAL at 09:10

## 2024-10-16 RX ADMIN — SENNOSIDES AND DOCUSATE SODIUM 1 TABLET: 50; 8.6 TABLET ORAL at 09:10

## 2024-10-16 RX ADMIN — AMPICILLIN SODIUM AND SULBACTAM SODIUM 3 G: 2; 1 INJECTION, POWDER, FOR SOLUTION INTRAMUSCULAR; INTRAVENOUS at 11:10

## 2024-10-16 RX ADMIN — LISINOPRIL 20 MG: 20 TABLET ORAL at 05:10

## 2024-10-16 RX ADMIN — GABAPENTIN 400 MG: 400 CAPSULE ORAL at 07:10

## 2024-10-16 RX ADMIN — CIPROFLOXACIN 400 MG: 2 INJECTION, SOLUTION INTRAVENOUS at 10:10

## 2024-10-16 RX ADMIN — HYDROXYZINE HYDROCHLORIDE 50 MG: 25 TABLET, FILM COATED ORAL at 09:10

## 2024-10-16 RX ADMIN — PANTOPRAZOLE SODIUM 40 MG: 40 TABLET, DELAYED RELEASE ORAL at 09:10

## 2024-10-16 RX ADMIN — METHOCARBAMOL 750 MG: 750 TABLET ORAL at 04:10

## 2024-10-16 RX ADMIN — OXYCODONE AND ACETAMINOPHEN 1 TABLET: 10; 325 TABLET ORAL at 10:10

## 2024-10-16 RX ADMIN — ENOXAPARIN SODIUM 70 MG: 80 INJECTION SUBCUTANEOUS at 04:10

## 2024-10-16 RX ADMIN — OXYBUTYNIN CHLORIDE 5 MG: 5 TABLET ORAL at 02:10

## 2024-10-16 RX ADMIN — AMPICILLIN SODIUM AND SULBACTAM SODIUM 3 G: 2; 1 INJECTION, POWDER, FOR SOLUTION INTRAMUSCULAR; INTRAVENOUS at 08:10

## 2024-10-16 RX ADMIN — AMPICILLIN SODIUM AND SULBACTAM SODIUM 3 G: 2; 1 INJECTION, POWDER, FOR SOLUTION INTRAMUSCULAR; INTRAVENOUS at 06:10

## 2024-10-16 RX ADMIN — CLONIDINE HYDROCHLORIDE 0.1 MG: 0.1 TABLET ORAL at 05:10

## 2024-10-16 RX ADMIN — NALOXONE HYDROCHLORIDE 0.4 MG: 0.4 INJECTION, SOLUTION INTRAMUSCULAR; INTRAVENOUS; SUBCUTANEOUS at 11:10

## 2024-10-16 RX ADMIN — AMPICILLIN SODIUM AND SULBACTAM SODIUM 3 G: 2; 1 INJECTION, POWDER, FOR SOLUTION INTRAMUSCULAR; INTRAVENOUS at 04:10

## 2024-10-16 RX ADMIN — CIPROFLOXACIN 400 MG: 2 INJECTION, SOLUTION INTRAVENOUS at 09:10

## 2024-10-16 RX ADMIN — CLONIDINE HYDROCHLORIDE 0.1 MG: 0.1 TABLET ORAL at 07:10

## 2024-10-16 RX ADMIN — AMPICILLIN SODIUM AND SULBACTAM SODIUM 3 G: 2; 1 INJECTION, POWDER, FOR SOLUTION INTRAMUSCULAR; INTRAVENOUS at 01:10

## 2024-10-16 RX ADMIN — ATORVASTATIN CALCIUM 20 MG: 10 TABLET, FILM COATED ORAL at 07:10

## 2024-10-16 NOTE — PHYSICIAN QUERY
Please clarify if there is any clinical correlation between pyelonephritis and catheter. Are the conditions:  Clinically undetermined
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
## 2024-10-16 NOTE — PHYSICIAN QUERY
Please clarify the infectious disease diagnosis.    Severe sepsis with acute organ dysfunction/failure (please specify the organ dysfunction/failure):   organismsCRAB (Carbapenem-resistant Acinetobacter baumannii)  Many Escherichia coli ESBL Abnormal   Moderate Enterococcus faecalis Abnormal         Organ dysfunction - kidney failure

## 2024-10-16 NOTE — PHYSICIAN QUERY
Due to the conflicting clinical picture, please clinically validate the ATN. If validated, please provide additional clinical support for the ATN.   The condition is not confirmed and/or it has been ruled out

## 2024-10-16 NOTE — PROGRESS NOTES
UROLOGY  PROGRESS  NOTE    Bianca Khan 1965  [id number]  10/16/2024    Patient resting in bed  Leakage much improved  SPT functioning well    VSS, afebrile   350 mL urine output overnight       Urine culture with no growth    Intake/Output:  No intake/output data recorded.  I/O last 3 completed shifts:  In: 370 [P.O.:360; I.V.:10]  Out: 1950 [Urine:1950]     Exam:    NAD  Card: RRR  Resp: unlabored  Abd: soft, NTND  : clear yellow urine draining to  bag  Extremity: BLE contractures    Recent Results (from the past 24 hours)   POCT glucose    Collection Time: 10/15/24  8:09 PM   Result Value Ref Range    POCT Glucose 164 (H) 70 - 110 mg/dL   Basic Metabolic Panel    Collection Time: 10/16/24  3:53 AM   Result Value Ref Range    Sodium 140 136 - 145 mmol/L    Potassium 4.6 3.5 - 5.1 mmol/L    Chloride 106 98 - 107 mmol/L    CO2 26 23 - 31 mmol/L    Glucose 114 82 - 115 mg/dL    Blood Urea Nitrogen 12.4 8.4 - 25.7 mg/dL    Creatinine 0.90 0.73 - 1.18 mg/dL    BUN/Creatinine Ratio 14     Calcium 8.4 (L) 8.8 - 10.0 mg/dL    Anion Gap 8.0 mEq/L    eGFR >60 mL/min/1.73/m2   Magnesium    Collection Time: 10/16/24  3:53 AM   Result Value Ref Range    Magnesium Level 1.70 1.60 - 2.60 mg/dL   CBC with Differential    Collection Time: 10/16/24  3:53 AM   Result Value Ref Range    WBC 9.87 4.50 - 11.50 x10(3)/mcL    RBC 3.84 (L) 4.70 - 6.10 x10(6)/mcL    Hgb 10.4 (L) 14.0 - 18.0 g/dL    Hct 32.9 (L) 42.0 - 52.0 %    MCV 85.7 80.0 - 94.0 fL    MCH 27.1 27.0 - 31.0 pg    MCHC 31.6 (L) 33.0 - 36.0 g/dL    RDW 17.7 (H) 11.5 - 17.0 %    Platelet 474 (H) 130 - 400 x10(3)/mcL    MPV 8.8 7.4 - 10.4 fL    Neut % 59.1 %    Lymph % 31.0 %    Mono % 8.2 %    Eos % 1.0 %    Basophil % 0.2 %    Lymph # 3.06 0.6 - 4.6 x10(3)/mcL    Neut # 5.83 2.1 - 9.2 x10(3)/mcL    Mono # 0.81 0.1 - 1.3 x10(3)/mcL    Eos # 0.10 0 - 0.9 x10(3)/mcL    Baso # 0.02 <=0.2 x10(3)/mcL    IG# 0.05 (H) 0 - 0.04 x10(3)/mcL    IG% 0.5 %    NRBC% 0.2 %   POCT  glucose    Collection Time: 10/16/24 11:14 AM   Result Value Ref Range    POCT Glucose 122 (H) 70 - 110 mg/dL       Assessment:  Paraplegia with neurogenic bladder and SPT admitted for sepsis s/t infected sacral wound; having leakage around SPT   -increased Ditropan and Levsin started yesterday without improvement  -SPT exchanged 10/15    Plan:  -continue ditropan, levsin prn  -continue routine SPT exchanges after discharge  -Urology is signing off. Please call as needed with any issues.       Kristin Quinn NP

## 2024-10-16 NOTE — PROGRESS NOTES
Infectious Diseases Progress Note  60 y/o male with past medical history of T-spine cord injury with paraplegia, diabetes, HTN, hepatitis-C, chronic MRSA L ankle wound/osteomyelitis, was on suppressive doxycycline and R knee infection/septic arthritis and osteomyelitis with GBS/Enterococcus and Ecoli isolates who presented to ER on 10/8 with increased generalized pain, increased foul smelling drainage from sacral wound with fever and chills. He was noted to be hypotensive per EMS. On admit he was afebrile without leukocytosis. ESR 98 and .80. MRSA PCR (-). U/A with 21-50 WBC, 0-5 RBC, 75 LE, no bacteria, negative nitrites. Urine culture negative. Blood cultures negative. Sacral wound culture with many Acinetobacter, many ESBL Ecoli, moderate Enterococcus, Bacteroides and Peptoniphilus. CT pelvis with contrast showed worsening inflammatory change of the sacral decubitus ulcers with gas now identified inferior to the right pubic ramus, stool noted throughout the colon as may be seen with constipation, pyelonephritis is not excluded. Seen by Surgery and underwent debridement of sacral wound.   He is currently on Unasyn and Cipro. Remains on chronic oral suppression with Doxycycline    Subjective:  Lying in bed, no acute distress. No new complaints voiced. Afebrile.    Review of Systems   Constitutional:  Positive for malaise/fatigue.   HENT: Negative.     Eyes: Negative.    Respiratory: Negative.     Cardiovascular: Negative.    Gastrointestinal: Negative.    Musculoskeletal: Negative.    Skin:         Sacral wound   Neurological:  Positive for focal weakness and weakness.   All other systems reviewed and are negative.      Review of patient's allergies indicates:   Allergen Reactions    Baclofen Itching and Anxiety       Past Medical History:   Diagnosis Date    Arthritis     Chronic ulcer of ankle 05/26/2022    ESBL (extended spectrum beta-lactamase) producing bacteria infection 08/30/2024    Frequent UTI  07/02/2019    Generalized anxiety disorder 05/26/2022    Neurogenic bladder 05/26/2022    Osteomyelitis 05/26/2022    Paraplegia     Presence of suprapubic catheter 05/26/2022    Pure hypercholesterolemia 05/26/2022    Retention of urine, unspecified 08/09/2019    Spinal cord injury at T1-T6 level 04/20/2018       Past Surgical History:   Procedure Laterality Date    APPLICATION OF WOUND VACUUM-ASSISTED CLOSURE DEVICE N/A 10/10/2024    Procedure: APPLICATION, WOUND VAC;  Surgeon: Rob Sinclair MD;  Location: [facility];  Service: General;  Laterality: N/A;    EGD, WITH CLOSED BIOPSY N/A 8/29/2024    Procedure: EGD, WITH CLOSED BIOPSY;  Surgeon: Mikal Sgeovia MD;  Location: [facility] ENDOSCOPY;  Service: Gastroenterology;  Laterality: N/A;    ESOPHAGOGASTRODUODENOSCOPY N/A 8/29/2024    Procedure: EGD;  Surgeon: Mikal Segovia MD;  Location: [facility] ENDOSCOPY;  Service: Gastroenterology;  Laterality: N/A;    FRACTURE SURGERY  2021    INCISION AND DRAINAGE, LOWER EXTREMITY Right 06/29/2023    Procedure: INCISION AND DRAINAGE, LOWER EXTREMITY;  Surgeon: Prabhu Shen DO;  Location: [facility] OR;  Service: Orthopedics;  Laterality: Right;  supine bone foam wash stuff cultures    INCISION AND DRAINAGE, LOWER EXTREMITY Right 11/30/2023    Procedure: INCISION AND DRAINAGE, LOWER EXTREMITY;  Surgeon: Prabhu Shen DO;  Location: [facility] OR;  Service: Orthopedics;  Laterality: Right;  supine any table bone foam wash stuff possible wound vac    INCISION AND DRAINAGE, LOWER EXTREMITY Right 12/1/2023    Procedure: INCISION AND DRAINAGE, LOWER EXTREMITY;  Surgeon: Prabhu Shen DO;  Location: [facility] OR;  Service: Orthopedics;  Laterality: Right;  supine bone foam vascular bed removal of distal femoral plate, wash stuff, possible AKA    INSERTION OF INTRAMEDULLARY MARISA Right 08/10/2022    Procedure: INSERTION, INTRAMEDULLARY MARISA RIGHT TIBIA;  Surgeon: Jorge Orellana MD;  Location: [facility];  Service: Orthopedics;  Laterality: Right;     JOINT REPLACEMENT      Jignesh    PRESSURE ULCER DEBRIDEMENT N/A 10/10/2024    Procedure: DEBRIDEMENT, PRESSURE ULCER;  Surgeon: Rob Sinclair MD;  Location: [facility] OR;  Service: General;  Laterality: N/A;  sacral wound, R buttock wound    REMOVAL OF HARDWARE FROM LOWER EXTREMITY Right 2023    Procedure: REMOVAL, HARDWARE, LOWER EXTREMITY;  Surgeon: Prabhu Shen DO;  Location: [facility] OR;  Service: Orthopedics;  Laterality: Right;    SPINE SURGERY  2018       Social History     Socioeconomic History    Marital status:    Tobacco Use    Smoking status: Some Days     Current packs/day: 0.00     Average packs/day: 0.2 packs/day for 41.5 years (10.4 ttl pk-yrs)     Types: Cigars, Cigarettes     Start date: 1982     Last attempt to quit: 2023     Years since quittin.2    Smokeless tobacco: Never   Substance and Sexual Activity    Alcohol use: Not Currently     Alcohol/week: 2.0 standard drinks of alcohol     Types: 2 Cans of beer per week    Drug use: Not Currently     Types: Oxycodone    Sexual activity: Not Currently     Partners: Female     Birth control/protection: None     Social Drivers of Health     Financial Resource Strain: Low Risk  (10/10/2024)    Overall Financial Resource Strain (CARDIA)     Difficulty of Paying Living Expenses: Not hard at all   Food Insecurity: No Food Insecurity (10/10/2024)    Hunger Vital Sign     Worried About Running Out of Food in the Last Year: Never true     Ran Out of Food in the Last Year: Never true   Transportation Needs: No Transportation Needs (10/10/2024)    TRANSPORTATION NEEDS     Transportation : No   Physical Activity: Inactive (2023)    Exercise Vital Sign     Days of Exercise per Week: 0 days     Minutes of Exercise per Session: 0 min   Stress: No Stress Concern Present (10/10/2024)    [employer] [place] [employer] - Occupational Stress Questionnaire     Feeling of Stress : Only a little   Housing Stability: Low  Risk  (10/10/2024)    Housing Stability Vital Sign     Unable to Pay for Housing in the Last Year: No     Homeless in the Last Year: No         Scheduled Meds:   ampicillin-sulbactam  3 g Intravenous Q4H    atorvastatin  20 mg Oral Nightly    carvediloL  25 mg Oral BID    ciprofloxacin  400 mg Intravenous Q12H    cloNIDine  0.1 mg Oral TID    doxycycline  100 mg Oral Q12H    enoxparin  1 mg/kg Subcutaneous Q12H (treatment, non-standard time)    fenofibrate  48 mg Oral Daily    FLUoxetine  20 mg Per G Tube Daily    gabapentin  400 mg Oral Q8H    hydrALAZINE  100 mg Oral TID    lisinopriL  20 mg Oral Daily    oxybutynin  5 mg Oral TID    pantoprazole  40 mg Oral Daily    senna-docusate 8.6-50 mg  1 tablet Oral BID    sodium chloride 0.9%  10 mL Intravenous Q6H     Continuous Infusions:  PRN Meds:  Current Facility-Administered Medications:     0.9%  NaCl infusion (for blood administration), , Intravenous, Q24H PRN    acetaminophen, 650 mg, Oral, Q4H PRN    aluminum-magnesium hydroxide-simethicone, 30 mL, Oral, QID PRN    dextrose 10%, 12.5 g, Intravenous, PRN    dextrose 10%, 25 g, Intravenous, PRN    diphenhydrAMINE, 50 mg, Intravenous, Q6H PRN    glucagon (human recombinant), 1 mg, Intramuscular, PRN    glucose, 16 g, Oral, PRN    glucose, 24 g, Oral, PRN    hyoscyamine, 0.125 mg, Sublingual, Q4H PRN    insulin aspart U-100, 1-10 Units, Subcutaneous, QID (AC + HS) PRN    melatonin, 6 mg, Oral, Nightly PRN    methocarbamoL, 750 mg, Oral, TID PRN    morphine, 2 mg, Intravenous, BID PRN    ondansetron, 4 mg, Intravenous, Q4H PRN    oxyCODONE-acetaminophen, 1 tablet, Oral, Q4H PRN    polyethylene glycol, 17 g, Oral, BID PRN    prochlorperazine, 5 mg, Intravenous, Q6H PRN    sodium chloride 0.9%, 10 mL, Intravenous, PRN    Flushing PICC/Midline Protocol, , , Until Discontinued **AND** sodium chloride 0.9%, 10 mL, Intravenous, Q6H **AND** sodium chloride 0.9%, 10 mL, Intravenous, PRN    traZODone, 200 mg, Oral, Nightly  "PRN    Objective:  BP (!) 160/93   Pulse 77   Temp 97.5 °F (36.4 °C) (Oral)   Resp 18   Ht 5' 10" (1.778 m)   Wt 73 kg (160 lb 15 oz)   SpO2 98%   BMI 23.09 kg/m²     Physical Exam:   Physical Exam  Vitals reviewed.   HENT:      Head: Normocephalic.   Cardiovascular:      Rate and Rhythm: Normal rate and regular rhythm.   Pulmonary:      Effort: Pulmonary effort is normal. No respiratory distress.      Breath sounds: Normal breath sounds. No wheezing.   Abdominal:      General: Bowel sounds are normal. There is no distension.      Palpations: Abdomen is soft.      Tenderness: There is no abdominal tenderness.   Genitourinary:     Comments: Suprapubic catheter  Musculoskeletal:      Cervical back: Normal range of motion.      Comments: Paraplegia   Skin:     Findings: No rash.      Comments: Wounds dressed   Neurological:      Mental Status: He is alert and oriented to person, place, and time.   Psychiatric:         Mood and Affect: Mood normal.         Behavior: Behavior normal.     Imaging    Lab Review   Recent Results (from the past 24 hours)   POCT glucose    Collection Time: 10/15/24  8:09 PM   Result Value Ref Range    POCT Glucose 164 (H) 70 - 110 mg/dL   Basic Metabolic Panel    Collection Time: 10/16/24  3:53 AM   Result Value Ref Range    Sodium 140 136 - 145 mmol/L    Potassium 4.6 3.5 - 5.1 mmol/L    Chloride 106 98 - 107 mmol/L    CO2 26 23 - 31 mmol/L    Glucose 114 82 - 115 mg/dL    Blood Urea Nitrogen 12.4 8.4 - 25.7 mg/dL    Creatinine 0.90 0.73 - 1.18 mg/dL    BUN/Creatinine Ratio 14     Calcium 8.4 (L) 8.8 - 10.0 mg/dL    Anion Gap 8.0 mEq/L    eGFR >60 mL/min/1.73/m2   Magnesium    Collection Time: 10/16/24  3:53 AM   Result Value Ref Range    Magnesium Level 1.70 1.60 - 2.60 mg/dL   CBC with Differential    Collection Time: 10/16/24  3:53 AM   Result Value Ref Range    WBC 9.87 4.50 - 11.50 x10(3)/mcL    RBC 3.84 (L) 4.70 - 6.10 x10(6)/mcL    Hgb 10.4 (L) 14.0 - 18.0 g/dL    Hct 32.9 (L) " 42.0 - 52.0 %    MCV 85.7 80.0 - 94.0 fL    MCH 27.1 27.0 - 31.0 pg    MCHC 31.6 (L) 33.0 - 36.0 g/dL    RDW 17.7 (H) 11.5 - 17.0 %    Platelet 474 (H) 130 - 400 x10(3)/mcL    MPV 8.8 7.4 - 10.4 fL    Neut % 59.1 %    Lymph % 31.0 %    Mono % 8.2 %    Eos % 1.0 %    Basophil % 0.2 %    Lymph # 3.06 0.6 - 4.6 x10(3)/mcL    Neut # 5.83 2.1 - 9.2 x10(3)/mcL    Mono # 0.81 0.1 - 1.3 x10(3)/mcL    Eos # 0.10 0 - 0.9 x10(3)/mcL    Baso # 0.02 <=0.2 x10(3)/mcL    IG# 0.05 (H) 0 - 0.04 x10(3)/mcL    IG% 0.5 %    NRBC% 0.2 %   POCT glucose    Collection Time: 10/16/24 11:14 AM   Result Value Ref Range    POCT Glucose 122 (H) 70 - 110 mg/dL   POCT glucose    Collection Time: 10/16/24  4:20 PM   Result Value Ref Range    POCT Glucose 73 70 - 110 mg/dL       Assessment/Plan:  Sepsis - resolved  Sacral wound infection - CR Acinetobacter / ESBL Ecoli / Enterococcus / Bacteroides / Peptoniphilus isolates  ANTON  Pyelonephritis per CT   Constipation  Paraplegia  Neurogenic bladder with suprapubic catheter  DM Type II  MRSA L ankle osteomyelitis with retained hardware  Anemia    -Continue Unasyn #3 and Cipro #3  -Plan a 14 day course unless MRI R hip warrants longer course  -Remains afebrile without leukocytosis  -CT pelvis with contrast showed worsening inflammatory change of sacral ulcer with gas inferior to the R pubic ramus  -Seen by Surgery, inputs noted  -S/P debridement of sacral wound on 10/10  -Sacral wound cultures revealing CR Acinetobacter, ESBL Ecoli, Enterococcus, Bacteroides, Peptoniphilus isolates  -Continue wound care  -Blood cultures negative   -Discussed with patient and nursing

## 2024-10-17 ENCOUNTER — ANESTHESIA EVENT (OUTPATIENT)
Dept: INTENSIVE CARE | Facility: HOSPITAL | Age: 59
DRG: 853 | End: 2024-10-17
Payer: MEDICARE

## 2024-10-17 ENCOUNTER — ANESTHESIA (OUTPATIENT)
Dept: INTENSIVE CARE | Facility: HOSPITAL | Age: 59
DRG: 853 | End: 2024-10-17
Payer: MEDICARE

## 2024-10-17 PROBLEM — R58 RETROPERITONEAL HEMORRHAGE: Status: ACTIVE | Noted: 2024-10-17

## 2024-10-17 LAB
ABO + RH BLD: NORMAL
ALBUMIN SERPL-MCNC: 1.6 G/DL (ref 3.4–4.8)
ALBUMIN SERPL-MCNC: 1.7 G/DL (ref 3.4–4.8)
ALBUMIN SERPL-MCNC: 2.1 G/DL (ref 3.4–4.8)
ALBUMIN/GLOB SERPL: 0.6 RATIO (ref 1.1–2)
ALBUMIN/GLOB SERPL: 0.8 RATIO (ref 1.1–2)
ALBUMIN/GLOB SERPL: 1 RATIO (ref 1.1–2)
ALLENS TEST BLOOD GAS (OHS): ABNORMAL
ALLENS TEST BLOOD GAS (OHS): ABNORMAL
ALLENS TEST BLOOD GAS (OHS): YES
ALP SERPL-CCNC: 41 UNIT/L (ref 40–150)
ALP SERPL-CCNC: 51 UNIT/L (ref 40–150)
ALP SERPL-CCNC: 52 UNIT/L (ref 40–150)
ALT SERPL-CCNC: 39 UNIT/L (ref 0–55)
ALT SERPL-CCNC: 7 UNIT/L (ref 0–55)
ALT SERPL-CCNC: 97 UNIT/L (ref 0–55)
AMORPH URATE CRY URNS QL MICRO: ABNORMAL /UL
ANION GAP SERPL CALC-SCNC: 16 MEQ/L
ANION GAP SERPL CALC-SCNC: 16 MEQ/L
ANION GAP SERPL CALC-SCNC: 17 MEQ/L
APTT PPP: 43.2 SECONDS (ref 23.2–33.7)
APTT PPP: 47.2 SECONDS (ref 23.2–33.7)
AST SERPL-CCNC: 14 UNIT/L (ref 5–34)
AST SERPL-CCNC: 178 UNIT/L (ref 5–34)
AST SERPL-CCNC: 77 UNIT/L (ref 5–34)
BACTERIA #/AREA URNS AUTO: ABNORMAL /HPF
BASE EXCESS BLD CALC-SCNC: -1.5 MMOL/L (ref -2–2)
BASE EXCESS BLD CALC-SCNC: -12 MMOL/L (ref -2–2)
BASE EXCESS BLD CALC-SCNC: -5.8 MMOL/L (ref -2–2)
BASOPHILS # BLD AUTO: 0.06 X10(3)/MCL
BASOPHILS # BLD AUTO: 0.06 X10(3)/MCL
BASOPHILS NFR BLD AUTO: 0.2 %
BASOPHILS NFR BLD AUTO: 0.3 %
BILIRUB SERPL-MCNC: 0.2 MG/DL
BILIRUB SERPL-MCNC: 0.2 MG/DL
BILIRUB SERPL-MCNC: 0.4 MG/DL
BILIRUB UR QL STRIP.AUTO: NEGATIVE
BLD PROD TYP BPU: NORMAL
BLOOD GAS SAMPLE TYPE (OHS): ABNORMAL
BLOOD UNIT EXPIRATION DATE: NORMAL
BLOOD UNIT TYPE CODE: 5100
BLOOD UNIT TYPE CODE: 5100
BLOOD UNIT TYPE CODE: 9500
BUN SERPL-MCNC: 14.7 MG/DL (ref 8.4–25.7)
BUN SERPL-MCNC: 16.4 MG/DL (ref 8.4–25.7)
BUN SERPL-MCNC: 18.4 MG/DL (ref 8.4–25.7)
CA-I BLD-SCNC: 0.9 MMOL/L (ref 1.12–1.23)
CA-I BLD-SCNC: 1.01 MMOL/L (ref 1.12–1.23)
CA-I BLD-SCNC: 1.1 MMOL/L (ref 1.12–1.23)
CALCIUM SERPL-MCNC: 7 MG/DL (ref 8.8–10)
CALCIUM SERPL-MCNC: 7.5 MG/DL (ref 8.8–10)
CALCIUM SERPL-MCNC: 9.9 MG/DL (ref 8.8–10)
CHLORIDE SERPL-SCNC: 103 MMOL/L (ref 98–107)
CHLORIDE SERPL-SCNC: 106 MMOL/L (ref 98–107)
CHLORIDE SERPL-SCNC: 108 MMOL/L (ref 98–107)
CLARITY UR: ABNORMAL
CO2 BLDA-SCNC: 16.1 MMOL/L
CO2 BLDA-SCNC: 20.4 MMOL/L
CO2 BLDA-SCNC: 23.9 MMOL/L
CO2 SERPL-SCNC: 11 MMOL/L (ref 23–31)
CO2 SERPL-SCNC: 16 MMOL/L (ref 23–31)
CO2 SERPL-SCNC: 21 MMOL/L (ref 23–31)
COHGB MFR BLDA: 1.1 % (ref 0.5–1.5)
COHGB MFR BLDA: 1.2 % (ref 0.5–1.5)
COHGB MFR BLDA: 1.2 % (ref 0.5–1.5)
COLOR UR AUTO: YELLOW
CREAT SERPL-MCNC: 1.66 MG/DL (ref 0.73–1.18)
CREAT SERPL-MCNC: 1.94 MG/DL (ref 0.73–1.18)
CREAT SERPL-MCNC: 1.96 MG/DL (ref 0.73–1.18)
CREAT/UREA NIT SERPL: 8
CREAT/UREA NIT SERPL: 9
CREAT/UREA NIT SERPL: 9
CROSSMATCH INTERPRETATION: NORMAL
DISPENSE STATUS: NORMAL
DRAWN BY BLOOD GAS (OHS): ABNORMAL
EOSINOPHIL # BLD AUTO: 0.01 X10(3)/MCL (ref 0–0.9)
EOSINOPHIL # BLD AUTO: 0.01 X10(3)/MCL (ref 0–0.9)
EOSINOPHIL NFR BLD AUTO: 0 %
EOSINOPHIL NFR BLD AUTO: 0 %
ERYTHROCYTE [DISTWIDTH] IN BLOOD BY AUTOMATED COUNT: 14.8 % (ref 11.5–17)
ERYTHROCYTE [DISTWIDTH] IN BLOOD BY AUTOMATED COUNT: 16.7 % (ref 11.5–17)
ERYTHROCYTE [DISTWIDTH] IN BLOOD BY AUTOMATED COUNT: 17.8 % (ref 11.5–17)
GFR SERPLBLD CREATININE-BSD FMLA CKD-EPI: 38 ML/MIN/1.73/M2
GFR SERPLBLD CREATININE-BSD FMLA CKD-EPI: 39 ML/MIN/1.73/M2
GFR SERPLBLD CREATININE-BSD FMLA CKD-EPI: 47 ML/MIN/1.73/M2
GLOBULIN SER-MCNC: 2 GM/DL (ref 2.4–3.5)
GLOBULIN SER-MCNC: 2.2 GM/DL (ref 2.4–3.5)
GLOBULIN SER-MCNC: 2.8 GM/DL (ref 2.4–3.5)
GLUCOSE SERPL-MCNC: 181 MG/DL (ref 82–115)
GLUCOSE SERPL-MCNC: 253 MG/DL (ref 82–115)
GLUCOSE SERPL-MCNC: 323 MG/DL (ref 82–115)
GLUCOSE UR QL STRIP: NORMAL
GROUP & RH: NORMAL
HCO3 BLDA-SCNC: 14.9 MMOL/L (ref 22–26)
HCO3 BLDA-SCNC: 19.3 MMOL/L (ref 22–26)
HCO3 BLDA-SCNC: 22.8 MMOL/L (ref 22–26)
HCT VFR BLD AUTO: 16.8 % (ref 42–52)
HCT VFR BLD AUTO: 19.8 % (ref 42–52)
HCT VFR BLD AUTO: 20.2 % (ref 42–52)
HCT VFR BLD AUTO: 20.7 % (ref 42–52)
HCT VFR BLD AUTO: 21.3 % (ref 42–52)
HCT VFR BLD AUTO: 21.6 % (ref 42–52)
HCT VFR BLD AUTO: 21.6 % (ref 42–52)
HCT VFR BLD AUTO: 23 % (ref 42–52)
HCT VFR BLD AUTO: 26.1 % (ref 42–52)
HCT VFR BLD AUTO: 27.5 % (ref 42–52)
HCT VFR BLD AUTO: 27.7 % (ref 42–52)
HGB BLD-MCNC: 5.2 G/DL (ref 14–18)
HGB BLD-MCNC: 6.9 G/DL (ref 14–18)
HGB BLD-MCNC: 7 G/DL (ref 14–18)
HGB BLD-MCNC: 7.3 G/DL (ref 14–18)
HGB BLD-MCNC: 7.3 G/DL (ref 14–18)
HGB BLD-MCNC: 7.6 G/DL (ref 14–18)
HGB BLD-MCNC: 7.9 G/DL (ref 14–18)
HGB BLD-MCNC: 8.3 G/DL (ref 14–18)
HGB BLD-MCNC: 9.3 G/DL (ref 14–18)
HGB BLD-MCNC: 9.4 G/DL (ref 14–18)
HGB BLD-MCNC: 9.7 G/DL (ref 14–18)
HGB UR QL STRIP: ABNORMAL
IMM GRANULOCYTES # BLD AUTO: 0.62 X10(3)/MCL (ref 0–0.04)
IMM GRANULOCYTES # BLD AUTO: 0.92 X10(3)/MCL (ref 0–0.04)
IMM GRANULOCYTES NFR BLD AUTO: 2.8 %
IMM GRANULOCYTES NFR BLD AUTO: 3.7 %
INDIRECT COOMBS: NORMAL
INHALED O2 CONCENTRATION: 100 %
INHALED O2 CONCENTRATION: 100 %
INR PPP: 1.7
INR PPP: 1.7
KETONES UR QL STRIP: ABNORMAL
LACTATE SERPL-SCNC: 7.3 MMOL/L (ref 0.5–2.2)
LEUKOCYTE ESTERASE UR QL STRIP: 75
LPM (OHS): 6
LYMPHOCYTES # BLD AUTO: 3.51 X10(3)/MCL (ref 0.6–4.6)
LYMPHOCYTES # BLD AUTO: 4.13 X10(3)/MCL (ref 0.6–4.6)
LYMPHOCYTES NFR BLD AUTO: 15.8 %
LYMPHOCYTES NFR BLD AUTO: 16.6 %
MAGNESIUM SERPL-MCNC: 1.5 MG/DL (ref 1.6–2.6)
MAGNESIUM SERPL-MCNC: 1.7 MG/DL (ref 1.6–2.6)
MAGNESIUM SERPL-MCNC: 1.7 MG/DL (ref 1.6–2.6)
MCH RBC QN AUTO: 27.5 PG (ref 27–31)
MCH RBC QN AUTO: 28.8 PG (ref 27–31)
MCH RBC QN AUTO: 29.8 PG (ref 27–31)
MCHC RBC AUTO-ENTMCNC: 31 G/DL (ref 33–36)
MCHC RBC AUTO-ENTMCNC: 32.4 G/DL (ref 33–36)
MCHC RBC AUTO-ENTMCNC: 34.2 G/DL (ref 33–36)
MCV RBC AUTO: 87.3 FL (ref 80–94)
MCV RBC AUTO: 88.9 FL (ref 80–94)
MCV RBC AUTO: 88.9 FL (ref 80–94)
MECH RR (OHS): 24 B/MIN
MECH RR (OHS): 24 B/MIN
METHGB MFR BLDA: 0 % (ref 0.4–1.5)
METHGB MFR BLDA: 0.2 % (ref 0.4–1.5)
METHGB MFR BLDA: 0.9 % (ref 0.4–1.5)
MODE (OHS): AC
MODE (OHS): AC
MONOCYTES # BLD AUTO: 0.61 X10(3)/MCL (ref 0.1–1.3)
MONOCYTES # BLD AUTO: 1.73 X10(3)/MCL (ref 0.1–1.3)
MONOCYTES NFR BLD AUTO: 2.7 %
MONOCYTES NFR BLD AUTO: 7 %
MUCOUS THREADS URNS QL MICRO: ABNORMAL /LPF
NEUTROPHILS # BLD AUTO: 17.39 X10(3)/MCL (ref 2.1–9.2)
NEUTROPHILS # BLD AUTO: 18 X10(3)/MCL (ref 2.1–9.2)
NEUTROPHILS NFR BLD AUTO: 72.5 %
NEUTROPHILS NFR BLD AUTO: 78.4 %
NITRITE UR QL STRIP: NEGATIVE
NRBC BLD AUTO-RTO: 0.1 %
NRBC BLD AUTO-RTO: 0.1 %
NRBC BLD AUTO-RTO: 0.2 %
O2 HB BLOOD GAS (OHS): 96.4 % (ref 94–97)
O2 HB BLOOD GAS (OHS): 97.4 % (ref 94–97)
O2 HB BLOOD GAS (OHS): 98.7 % (ref 94–97)
OHS QRS DURATION: 70 MS
OHS QRS DURATION: 70 MS
OHS QTC CALCULATION: 497 MS
OHS QTC CALCULATION: 500 MS
OXYGEN DEVICE BLOOD GAS (OHS): ABNORMAL
OXYHGB MFR BLDA: 10.3 G/DL (ref 12–16)
OXYHGB MFR BLDA: 6.8 G/DL (ref 12–16)
OXYHGB MFR BLDA: <6.5 G/DL (ref 12–16)
PCO2 BLDA: 35 MMHG (ref 35–45)
PCO2 BLDA: 36 MMHG (ref 35–45)
PCO2 BLDA: 38 MMHG (ref 35–45)
PEEP RESPIRATORY: 5 CMH2O
PEEP RESPIRATORY: 5 CMH2O
PH BLDA: 7.2 [PH] (ref 7.35–7.45)
PH BLDA: 7.35 [PH] (ref 7.35–7.45)
PH BLDA: 7.41 [PH] (ref 7.35–7.45)
PH UR STRIP: 5.5 [PH]
PHOSPHATE SERPL-MCNC: 7.3 MG/DL (ref 2.3–4.7)
PHOSPHATE SERPL-MCNC: 8.4 MG/DL (ref 2.3–4.7)
PHOSPHATE SERPL-MCNC: 9.5 MG/DL (ref 2.3–4.7)
PLATELET # BLD AUTO: 216 X10(3)/MCL (ref 130–400)
PLATELET # BLD AUTO: 222 X10(3)/MCL (ref 130–400)
PLATELET # BLD AUTO: 372 X10(3)/MCL (ref 130–400)
PMV BLD AUTO: 9.2 FL (ref 7.4–10.4)
PMV BLD AUTO: 9.5 FL (ref 7.4–10.4)
PMV BLD AUTO: 9.5 FL (ref 7.4–10.4)
PO2 BLDA: 416 MMHG (ref 80–100)
PO2 BLDA: 450 MMHG (ref 80–100)
PO2 BLDA: 87 MMHG (ref 80–100)
POCT GLUCOSE: 217 MG/DL (ref 70–110)
POCT GLUCOSE: 246 MG/DL (ref 70–110)
POTASSIUM BLOOD GAS (OHS): 4.6 MMOL/L (ref 3.5–5)
POTASSIUM BLOOD GAS (OHS): 5 MMOL/L (ref 3.5–5)
POTASSIUM BLOOD GAS (OHS): 5.3 MMOL/L (ref 3.5–5)
POTASSIUM SERPL-SCNC: 5.1 MMOL/L (ref 3.5–5.1)
POTASSIUM SERPL-SCNC: 5.4 MMOL/L (ref 3.5–5.1)
POTASSIUM SERPL-SCNC: 5.6 MMOL/L (ref 3.5–5.1)
PROT SERPL-MCNC: 3.6 GM/DL (ref 5.8–7.6)
PROT SERPL-MCNC: 4.3 GM/DL (ref 5.8–7.6)
PROT SERPL-MCNC: 4.5 GM/DL (ref 5.8–7.6)
PROT UR QL STRIP: ABNORMAL
PROTHROMBIN TIME: 19.7 SECONDS (ref 12.5–14.5)
PROTHROMBIN TIME: 20.4 SECONDS (ref 12.5–14.5)
RBC # BLD AUTO: 1.89 X10(6)/MCL (ref 4.7–6.1)
RBC # BLD AUTO: 2.43 X10(6)/MCL (ref 4.7–6.1)
RBC # BLD AUTO: 3.15 X10(6)/MCL (ref 4.7–6.1)
RBC #/AREA URNS AUTO: ABNORMAL /HPF
SAMPLE SITE BLOOD GAS (OHS): ABNORMAL
SAO2 % BLDA: 100 %
SAO2 % BLDA: 100 %
SAO2 % BLDA: 94.1 %
SODIUM BLOOD GAS (OHS): 130 MMOL/L (ref 137–145)
SODIUM BLOOD GAS (OHS): 132 MMOL/L (ref 137–145)
SODIUM BLOOD GAS (OHS): 132 MMOL/L (ref 137–145)
SODIUM SERPL-SCNC: 135 MMOL/L (ref 136–145)
SODIUM SERPL-SCNC: 139 MMOL/L (ref 136–145)
SODIUM SERPL-SCNC: 140 MMOL/L (ref 136–145)
SP GR UR STRIP.AUTO: 1.05 (ref 1–1.03)
SPECIMEN OUTDATE: NORMAL
SPONT+MECH VT ON VENT: 450 ML
SPONT+MECH VT ON VENT: 450 ML
SQUAMOUS #/AREA URNS LPF: ABNORMAL /HPF
UNIT NUMBER: NORMAL
UROBILINOGEN UR STRIP-ACNC: NORMAL
WBC # BLD AUTO: 22.2 X10(3)/MCL (ref 4.5–11.5)
WBC # BLD AUTO: 23.34 X10(3)/MCL (ref 4.5–11.5)
WBC # BLD AUTO: 24.85 X10(3)/MCL (ref 4.5–11.5)
WBC #/AREA URNS AUTO: ABNORMAL /HPF

## 2024-10-17 PROCEDURE — 37244 VASC EMBOLIZE/OCCLUDE BLEED: CPT | Mod: LT,,, | Performed by: SPECIALIST

## 2024-10-17 PROCEDURE — 85025 COMPLETE CBC W/AUTO DIFF WBC: CPT

## 2024-10-17 PROCEDURE — 25000003 PHARM REV CODE 250

## 2024-10-17 PROCEDURE — 83735 ASSAY OF MAGNESIUM: CPT

## 2024-10-17 PROCEDURE — 84100 ASSAY OF PHOSPHORUS: CPT | Performed by: INTERNAL MEDICINE

## 2024-10-17 PROCEDURE — 37244 VASC EMBOLIZE/OCCLUDE BLEED: CPT | Mod: LT | Performed by: SPECIALIST

## 2024-10-17 PROCEDURE — C1769 GUIDE WIRE: HCPCS | Performed by: SPECIALIST

## 2024-10-17 PROCEDURE — 63600175 PHARM REV CODE 636 W HCPCS

## 2024-10-17 PROCEDURE — 85730 THROMBOPLASTIN TIME PARTIAL: CPT | Performed by: INTERNAL MEDICINE

## 2024-10-17 PROCEDURE — 85018 HEMOGLOBIN: CPT

## 2024-10-17 PROCEDURE — 93005 ELECTROCARDIOGRAM TRACING: CPT

## 2024-10-17 PROCEDURE — 37799 UNLISTED PX VASCULAR SURGERY: CPT

## 2024-10-17 PROCEDURE — 25000003 PHARM REV CODE 250: Performed by: STUDENT IN AN ORGANIZED HEALTH CARE EDUCATION/TRAINING PROGRAM

## 2024-10-17 PROCEDURE — 86923 COMPATIBILITY TEST ELECTRIC: CPT | Mod: 91 | Performed by: SPECIALIST

## 2024-10-17 PROCEDURE — 83605 ASSAY OF LACTIC ACID: CPT | Performed by: INTERNAL MEDICINE

## 2024-10-17 PROCEDURE — 84100 ASSAY OF PHOSPHORUS: CPT

## 2024-10-17 PROCEDURE — 97606 NEG PRS WND THER DME>50 SQCM: CPT

## 2024-10-17 PROCEDURE — 27800903 OPTIME MED/SURG SUP & DEVICES OTHER IMPLANTS: Performed by: SPECIALIST

## 2024-10-17 PROCEDURE — 30233L1 TRANSFUSION OF NONAUTOLOGOUS FRESH PLASMA INTO PERIPHERAL VEIN, PERCUTANEOUS APPROACH: ICD-10-PCS | Performed by: INTERNAL MEDICINE

## 2024-10-17 PROCEDURE — 85730 THROMBOPLASTIN TIME PARTIAL: CPT

## 2024-10-17 PROCEDURE — C1894 INTRO/SHEATH, NON-LASER: HCPCS | Performed by: SPECIALIST

## 2024-10-17 PROCEDURE — 25000003 PHARM REV CODE 250: Performed by: INTERNAL MEDICINE

## 2024-10-17 PROCEDURE — 25500020 PHARM REV CODE 255: Performed by: INTERNAL MEDICINE

## 2024-10-17 PROCEDURE — 86900 BLOOD TYPING SEROLOGIC ABO: CPT

## 2024-10-17 PROCEDURE — 36415 COLL VENOUS BLD VENIPUNCTURE: CPT

## 2024-10-17 PROCEDURE — 25500020 PHARM REV CODE 255: Performed by: SPECIALIST

## 2024-10-17 PROCEDURE — A4216 STERILE WATER/SALINE, 10 ML: HCPCS | Performed by: STUDENT IN AN ORGANIZED HEALTH CARE EDUCATION/TRAINING PROGRAM

## 2024-10-17 PROCEDURE — 04LA3DZ OCCLUSION OF LEFT RENAL ARTERY WITH INTRALUMINAL DEVICE, PERCUTANEOUS APPROACH: ICD-10-PCS | Performed by: SPECIALIST

## 2024-10-17 PROCEDURE — 99223 1ST HOSP IP/OBS HIGH 75: CPT | Mod: GC,,, | Performed by: SURGERY

## 2024-10-17 PROCEDURE — 27201423 OPTIME MED/SURG SUP & DEVICES STERILE SUPPLY: Performed by: SPECIALIST

## 2024-10-17 PROCEDURE — P9016 RBC LEUKOCYTES REDUCED: HCPCS

## 2024-10-17 PROCEDURE — 94761 N-INVAS EAR/PLS OXIMETRY MLT: CPT | Mod: XB

## 2024-10-17 PROCEDURE — 36600 WITHDRAWAL OF ARTERIAL BLOOD: CPT

## 2024-10-17 PROCEDURE — 5A1945Z RESPIRATORY VENTILATION, 24-96 CONSECUTIVE HOURS: ICD-10-PCS | Performed by: INTERNAL MEDICINE

## 2024-10-17 PROCEDURE — 81001 URINALYSIS AUTO W/SCOPE: CPT | Performed by: INTERNAL MEDICINE

## 2024-10-17 PROCEDURE — 85384 FIBRINOGEN ACTIVITY: CPT | Performed by: INTERNAL MEDICINE

## 2024-10-17 PROCEDURE — 27100171 HC OXYGEN HIGH FLOW UP TO 24 HOURS

## 2024-10-17 PROCEDURE — 99900031 HC PATIENT EDUCATION (STAT)

## 2024-10-17 PROCEDURE — C1760 CLOSURE DEV, VASC: HCPCS | Performed by: SPECIALIST

## 2024-10-17 PROCEDURE — 99900035 HC TECH TIME PER 15 MIN (STAT)

## 2024-10-17 PROCEDURE — 36245 INS CATH ABD/L-EXT ART 1ST: CPT | Mod: LT,,, | Performed by: SPECIALIST

## 2024-10-17 PROCEDURE — C1887 CATHETER, GUIDING: HCPCS | Performed by: SPECIALIST

## 2024-10-17 PROCEDURE — 82803 BLOOD GASES ANY COMBINATION: CPT

## 2024-10-17 PROCEDURE — 94002 VENT MGMT INPAT INIT DAY: CPT

## 2024-10-17 PROCEDURE — 83735 ASSAY OF MAGNESIUM: CPT | Performed by: INTERNAL MEDICINE

## 2024-10-17 PROCEDURE — 85025 COMPLETE CBC W/AUTO DIFF WBC: CPT | Performed by: INTERNAL MEDICINE

## 2024-10-17 PROCEDURE — 20000000 HC ICU ROOM

## 2024-10-17 PROCEDURE — 02HV33Z INSERTION OF INFUSION DEVICE INTO SUPERIOR VENA CAVA, PERCUTANEOUS APPROACH: ICD-10-PCS | Performed by: SPECIALIST

## 2024-10-17 PROCEDURE — 80053 COMPREHEN METABOLIC PANEL: CPT

## 2024-10-17 PROCEDURE — 36245 INS CATH ABD/L-EXT ART 1ST: CPT | Mod: LT | Performed by: SPECIALIST

## 2024-10-17 PROCEDURE — 86923 COMPATIBILITY TEST ELECTRIC: CPT | Mod: 91

## 2024-10-17 PROCEDURE — P9035 PLATELET PHERES LEUKOREDUCED: HCPCS

## 2024-10-17 PROCEDURE — 85610 PROTHROMBIN TIME: CPT

## 2024-10-17 PROCEDURE — 93010 ELECTROCARDIOGRAM REPORT: CPT | Mod: 76,,, | Performed by: INTERNAL MEDICINE

## 2024-10-17 PROCEDURE — 27000207 HC ISOLATION

## 2024-10-17 PROCEDURE — 36620 INSERTION CATHETER ARTERY: CPT

## 2024-10-17 PROCEDURE — 80053 COMPREHEN METABOLIC PANEL: CPT | Performed by: INTERNAL MEDICINE

## 2024-10-17 PROCEDURE — 85027 COMPLETE CBC AUTOMATED: CPT

## 2024-10-17 PROCEDURE — 99223 1ST HOSP IP/OBS HIGH 75: CPT | Mod: 25,,, | Performed by: SPECIALIST

## 2024-10-17 PROCEDURE — 63600175 PHARM REV CODE 636 W HCPCS: Performed by: SPECIALIST

## 2024-10-17 PROCEDURE — 63600175 PHARM REV CODE 636 W HCPCS: Mod: JG | Performed by: INTERNAL MEDICINE

## 2024-10-17 PROCEDURE — C1751 CATH, INF, PER/CENT/MIDLINE: HCPCS | Performed by: SPECIALIST

## 2024-10-17 PROCEDURE — 93010 ELECTROCARDIOGRAM REPORT: CPT | Mod: ,,, | Performed by: INTERNAL MEDICINE

## 2024-10-17 PROCEDURE — 87040 BLOOD CULTURE FOR BACTERIA: CPT | Performed by: INTERNAL MEDICINE

## 2024-10-17 PROCEDURE — B4171ZZ FLUOROSCOPY OF LEFT RENAL ARTERY USING LOW OSMOLAR CONTRAST: ICD-10-PCS | Performed by: SPECIALIST

## 2024-10-17 PROCEDURE — 85610 PROTHROMBIN TIME: CPT | Performed by: INTERNAL MEDICINE

## 2024-10-17 PROCEDURE — 63600175 PHARM REV CODE 636 W HCPCS: Performed by: STUDENT IN AN ORGANIZED HEALTH CARE EDUCATION/TRAINING PROGRAM

## 2024-10-17 PROCEDURE — 99900026 HC AIRWAY MAINTENANCE (STAT)

## 2024-10-17 PROCEDURE — 36415 COLL VENOUS BLD VENIPUNCTURE: CPT | Performed by: INTERNAL MEDICINE

## 2024-10-17 PROCEDURE — 0BH17EZ INSERTION OF ENDOTRACHEAL AIRWAY INTO TRACHEA, VIA NATURAL OR ARTIFICIAL OPENING: ICD-10-PCS | Performed by: INTERNAL MEDICINE

## 2024-10-17 PROCEDURE — 94760 N-INVAS EAR/PLS OXIMETRY 1: CPT | Mod: XB

## 2024-10-17 PROCEDURE — P9017 PLASMA 1 DONOR FRZ W/IN 8 HR: HCPCS

## 2024-10-17 DEVICE — RUBY STANDARD, 5X12
Type: IMPLANTABLE DEVICE | Site: ARTERIAL | Status: FUNCTIONAL
Brand: RUBY COIL

## 2024-10-17 DEVICE — RUBY SOFT, 4X15
Type: IMPLANTABLE DEVICE | Site: ARTERIAL | Status: FUNCTIONAL
Brand: RUBY COIL

## 2024-10-17 DEVICE — POD, 4X30
Type: IMPLANTABLE DEVICE | Site: ARTERIAL | Status: FUNCTIONAL
Brand: POD

## 2024-10-17 DEVICE — PRESSURE INJECTABLE ARROWG+ARD BLUE PLUS(R) THREE-LUMEN CVC
Type: IMPLANTABLE DEVICE | Site: ARTERIAL | Status: FUNCTIONAL
Brand: ARROW

## 2024-10-17 RX ORDER — NOREPINEPHRINE BITARTRATE/D5W 8 MG/250ML
0-3 PLASTIC BAG, INJECTION (ML) INTRAVENOUS CONTINUOUS
Status: DISCONTINUED | OUTPATIENT
Start: 2024-10-17 | End: 2024-10-17

## 2024-10-17 RX ORDER — HYDROCODONE BITARTRATE AND ACETAMINOPHEN 500; 5 MG/1; MG/1
TABLET ORAL
Status: DISCONTINUED | OUTPATIENT
Start: 2024-10-17 | End: 2024-10-28

## 2024-10-17 RX ORDER — CALCIUM GLUCONATE 20 MG/ML
1 INJECTION, SOLUTION INTRAVENOUS ONCE
Status: DISCONTINUED | OUTPATIENT
Start: 2024-10-17 | End: 2024-10-31

## 2024-10-17 RX ORDER — ATORVASTATIN CALCIUM 10 MG/1
20 TABLET, FILM COATED ORAL NIGHTLY
Status: DISCONTINUED | OUTPATIENT
Start: 2024-10-17 | End: 2024-11-06

## 2024-10-17 RX ORDER — OXYBUTYNIN CHLORIDE 5 MG/1
5 TABLET ORAL 3 TIMES DAILY
Status: DISCONTINUED | OUTPATIENT
Start: 2024-10-17 | End: 2024-11-21

## 2024-10-17 RX ORDER — GABAPENTIN 400 MG/1
400 CAPSULE ORAL EVERY 8 HOURS
Status: DISCONTINUED | OUTPATIENT
Start: 2024-10-17 | End: 2024-11-06

## 2024-10-17 RX ORDER — ETOMIDATE 2 MG/ML
INJECTION INTRAVENOUS CODE/TRAUMA/SEDATION MEDICATION
Status: DISCONTINUED | OUTPATIENT
Start: 2024-10-17 | End: 2024-11-26 | Stop reason: HOSPADM

## 2024-10-17 RX ORDER — SODIUM CHLORIDE 900 MG/100ML
1000 INJECTION INTRAVENOUS ONCE
Status: COMPLETED | OUTPATIENT
Start: 2024-10-17 | End: 2024-10-17

## 2024-10-17 RX ORDER — LIDOCAINE HYDROCHLORIDE 10 MG/ML
INJECTION, SOLUTION INFILTRATION; PERINEURAL
Status: DISCONTINUED | OUTPATIENT
Start: 2024-10-17 | End: 2024-10-17 | Stop reason: HOSPADM

## 2024-10-17 RX ORDER — INDOMETHACIN 25 MG/1
CAPSULE ORAL
Status: COMPLETED
Start: 2024-10-17 | End: 2024-10-17

## 2024-10-17 RX ORDER — MUPIROCIN 20 MG/G
OINTMENT TOPICAL 2 TIMES DAILY
Status: COMPLETED | OUTPATIENT
Start: 2024-10-21 | End: 2024-10-25

## 2024-10-17 RX ORDER — FENOFIBRATE 48 MG/1
48 TABLET, FILM COATED ORAL DAILY
Status: DISCONTINUED | OUTPATIENT
Start: 2024-10-18 | End: 2024-11-06

## 2024-10-17 RX ORDER — FENTANYL CITRATE-0.9 % NACL/PF 10 MCG/ML
0-250 PLASTIC BAG, INJECTION (ML) INTRAVENOUS CONTINUOUS
Status: DISCONTINUED | OUTPATIENT
Start: 2024-10-17 | End: 2024-10-19

## 2024-10-17 RX ORDER — MAGNESIUM SULFATE HEPTAHYDRATE 40 MG/ML
2 INJECTION, SOLUTION INTRAVENOUS ONCE
Status: COMPLETED | OUTPATIENT
Start: 2024-10-17 | End: 2024-10-17

## 2024-10-17 RX ORDER — SODIUM CHLORIDE 9 MG/ML
500 INJECTION, SOLUTION INTRAVENOUS ONCE
Status: COMPLETED | OUTPATIENT
Start: 2024-10-16 | End: 2024-10-16

## 2024-10-17 RX ORDER — INDOMETHACIN 25 MG/1
100 CAPSULE ORAL ONCE
Status: COMPLETED | OUTPATIENT
Start: 2024-10-17 | End: 2024-10-17

## 2024-10-17 RX ORDER — IOPAMIDOL 755 MG/ML
INJECTION, SOLUTION INTRAVASCULAR
Status: DISCONTINUED | OUTPATIENT
Start: 2024-10-17 | End: 2024-10-17 | Stop reason: HOSPADM

## 2024-10-17 RX ORDER — ROCURONIUM BROMIDE 10 MG/ML
INJECTION, SOLUTION INTRAVENOUS CODE/TRAUMA/SEDATION MEDICATION
Status: DISCONTINUED | OUTPATIENT
Start: 2024-10-17 | End: 2024-11-26 | Stop reason: HOSPADM

## 2024-10-17 RX ORDER — NOREPINEPHRINE BITARTRATE/D5W 8 MG/250ML
PLASTIC BAG, INJECTION (ML) INTRAVENOUS
Status: COMPLETED
Start: 2024-10-17 | End: 2024-10-17

## 2024-10-17 RX ADMIN — OXYBUTYNIN CHLORIDE 5 MG: 5 TABLET ORAL at 08:10

## 2024-10-17 RX ADMIN — INSULIN ASPART 4 UNITS: 100 INJECTION, SOLUTION INTRAVENOUS; SUBCUTANEOUS at 10:10

## 2024-10-17 RX ADMIN — SENNOSIDES AND DOCUSATE SODIUM 1 TABLET: 50; 8.6 TABLET ORAL at 08:10

## 2024-10-17 RX ADMIN — VASOPRESSIN 0.1 UNITS/MIN: 20 INJECTION INTRAVENOUS at 11:10

## 2024-10-17 RX ADMIN — INDOMETHACIN 100 MEQ: 25 CAPSULE ORAL at 02:10

## 2024-10-17 RX ADMIN — NOREPINEPHRINE BITARTRATE 2 MCG/KG/MIN: 1 INJECTION, SOLUTION, CONCENTRATE INTRAVENOUS at 04:10

## 2024-10-17 RX ADMIN — VASOPRESSIN 0.04 UNITS/MIN: 20 INJECTION INTRAVENOUS at 01:10

## 2024-10-17 RX ADMIN — Medication 50 MCG/HR: at 11:10

## 2024-10-17 RX ADMIN — SODIUM CHLORIDE 1000 ML: 900 INJECTION INTRAVENOUS at 01:10

## 2024-10-17 RX ADMIN — MORPHINE SULFATE 2 MG: 4 INJECTION, SOLUTION INTRAMUSCULAR; INTRAVENOUS at 03:10

## 2024-10-17 RX ADMIN — SODIUM BICARBONATE: 84 INJECTION, SOLUTION INTRAVENOUS at 05:10

## 2024-10-17 RX ADMIN — VASOPRESSIN 0.1 UNITS/MIN: 20 INJECTION INTRAVENOUS at 03:10

## 2024-10-17 RX ADMIN — GABAPENTIN 400 MG: 300 CAPSULE ORAL at 09:10

## 2024-10-17 RX ADMIN — ATORVASTATIN CALCIUM 20 MG: 10 TABLET, FILM COATED ORAL at 08:10

## 2024-10-17 RX ADMIN — NOREPINEPHRINE BITARTRATE 0.7 MCG/KG/MIN: 8 INJECTION, SOLUTION INTRAVENOUS at 02:10

## 2024-10-17 RX ADMIN — SODIUM CHLORIDE, PRESERVATIVE FREE 10 ML: 5 INJECTION INTRAVENOUS at 12:10

## 2024-10-17 RX ADMIN — ROCURONIUM BROMIDE 73 MG: 10 SOLUTION INTRAVENOUS at 05:10

## 2024-10-17 RX ADMIN — ETOMIDATE 20 MG: 2 INJECTION INTRAVENOUS at 05:10

## 2024-10-17 RX ADMIN — MAGNESIUM SULFATE HEPTAHYDRATE 2 G: 40 INJECTION, SOLUTION INTRAVENOUS at 06:10

## 2024-10-17 RX ADMIN — NOREPINEPHRINE BITARTRATE 2.9 MCG/KG/MIN: 8 INJECTION, SOLUTION INTRAVENOUS at 01:10

## 2024-10-17 RX ADMIN — SODIUM CHLORIDE, PRESERVATIVE FREE 10 ML: 5 INJECTION INTRAVENOUS at 06:10

## 2024-10-17 RX ADMIN — VANCOMYCIN HYDROCHLORIDE 1500 MG: 1.5 INJECTION, POWDER, LYOPHILIZED, FOR SOLUTION INTRAVENOUS at 01:10

## 2024-10-17 RX ADMIN — PIPERACILLIN AND TAZOBACTAM 4.5 G: 4; .5 INJECTION, POWDER, LYOPHILIZED, FOR SOLUTION INTRAVENOUS; PARENTERAL at 01:10

## 2024-10-17 RX ADMIN — HYDROCORTISONE SODIUM SUCCINATE 100 MG: 100 INJECTION, POWDER, FOR SOLUTION INTRAMUSCULAR; INTRAVENOUS at 09:10

## 2024-10-17 RX ADMIN — HYDROCORTISONE SODIUM SUCCINATE 100 MG: 100 INJECTION, POWDER, FOR SOLUTION INTRAMUSCULAR; INTRAVENOUS at 01:10

## 2024-10-17 RX ADMIN — SODIUM BICARBONATE 100 MEQ: 84 INJECTION, SOLUTION INTRAVENOUS at 02:10

## 2024-10-17 RX ADMIN — IOHEXOL 100 ML: 350 INJECTION, SOLUTION INTRAVENOUS at 05:10

## 2024-10-17 RX ADMIN — PIPERACILLIN AND TAZOBACTAM 4.5 G: 4; .5 INJECTION, POWDER, LYOPHILIZED, FOR SOLUTION INTRAVENOUS; PARENTERAL at 07:10

## 2024-10-17 RX ADMIN — SODIUM BICARBONATE: 84 INJECTION, SOLUTION INTRAVENOUS at 03:10

## 2024-10-17 RX ADMIN — VASOPRESSIN 0.1 UNITS/MIN: 20 INJECTION INTRAVENOUS at 07:10

## 2024-10-17 NOTE — PROGRESS NOTES
Pharmacokinetic Initial Assessment: IV Vancomycin    Assessment/Plan:  Initiate intravenous vancomycin with loading dose of 1500 mg once with subsequent doses when random concentrations are less than 20 mcg/mL  Desired empiric serum trough concentration is 15 to 20 mcg/mL  Draw vancomycin random level on 10/18 at 0430 with AM labs.  Pharmacy will continue to follow and monitor vancomycin.      Please contact pharmacy at extension [phone] with any questions regarding this assessment.     Thank you for the consult,   Malgorzata Seymour       Patient brief summary:  Bianca Khan is a 59 y.o. male initiated on antimicrobial therapy with IV Vancomycin for treatment of suspected sepsis    Drug Allergies:   Review of patient's allergies indicates:   Allergen Reactions    Baclofen Itching and Anxiety       Actual Body Weight:   Wt Readings from Last 1 Encounters:   10/09/24 73 kg (160 lb 15 oz)       Renal Function:   Estimated Creatinine Clearance: 48.9 mL/min (A) (based on SCr of 1.66 mg/dL (H)).,     Dialysis Method (if applicable):  N/A    CBC (last 72 hours):  Recent Labs   Lab Result Units 10/16/24  0353 10/17/24  0152   WBC x10(3)/mcL 9.87 23.34*   Hgb g/dL 10.4* 5.2*   Hct % 32.9* 16.8*   Platelet x10(3)/mcL 474* 372   Mono % % 8.2  --    Eos % % 1.0  --    Basophil % % 0.2  --        Metabolic Panel (last 72 hours):  Recent Labs   Lab Result Units 10/16/24  0353 10/17/24  0130 10/17/24  0152   Sodium mmol/L 140  --  135*   Sodium, Blood Gas mmol/L  --  132*  --    Potassium mmol/L 4.6  --  5.4*   Potassium, Blood Gas mmol/L  --  5.0  --    Chloride mmol/L 106  --  108*   CO2 mmol/L 26  --  11*   Glucose mg/dL 114  --  323*   Blood Urea Nitrogen mg/dL 12.4  --  14.7   Creatinine mg/dL 0.90  --  1.66*   Albumin g/dL  --   --  1.7*   Bilirubin Total mg/dL  --   --  0.2   ALP unit/L  --   --  52   AST unit/L  --   --  14   ALT unit/L  --   --  7   Magnesium Level mg/dL 1.70  --  1.70   Phosphorus Level mg/dL  --   --  7.3*  "      Drug levels (last 3 results):  No results for input(s): "VANCOMYCINRA", "VANCORANDOM", "VANCOMYCINPE", "VANCOPEAK", "VANCOMYCINTR", "VANCOTROUGH" in the last 72 hours.    Microbiologic Results:  Microbiology Results (last 7 days)       Procedure Component Value Units Date/Time    Blood Culture [[id number]]     Order Status: Sent Specimen: Blood     Blood Culture [[id number]]     Order Status: Sent Specimen: Blood     Blood culture #2 **CANNOT BE ORDERED STAT** [[id number]]  (Normal) Collected: 10/09/24 0521    Order Status: Completed Specimen: Blood from Arm, Right Updated: 10/14/24 1101     Blood Culture No Growth at 5 days    Blood culture #1 **CANNOT BE ORDERED STAT** [[id number]]  (Normal) Collected: 10/08/24 1817    Order Status: Completed Specimen: Blood Updated: 10/13/24 1900     Blood Culture No Growth at 5 days    Wound Culture [[id number]]  (Abnormal)  (Susceptibility) Collected: 10/09/24 1011    Order Status: Completed Specimen: Wound from Sacral Updated: 10/12/24 1219     Wound Culture Many ACINETOBACTER BAUMANNII     Comment: CRAB (Carbapenem-resistant Acinetobacter baumannii)         Many Escherichia coli ESBL      Moderate Enterococcus faecalis    Anaerobic Culture [[id number]]  (Abnormal) Collected: 10/09/24 1011    Order Status: Completed Specimen: Wound from Sacral Updated: 10/12/24 1123     Anaerobe Culture Bacteroides thetaiotaomicron      Peptoniphilus asaccharolyticus     Comment: Anaerobic sensitivity is not usually indicated except on single isolates from sterile body sites.       Urine culture [[id number]] Collected: 10/08/24 2338    Order Status: Completed Specimen: Urine Updated: 10/11/24 0719     Urine Culture No Growth           "

## 2024-10-17 NOTE — PROGRESS NOTES
[facility] - 7th Floor ICU  Wound Care    Patient Name:  Bianca Khan   MRN:  [id number]  Date: 10/17/2024  Diagnosis: Sacral wound    History:     Past Medical History:   Diagnosis Date    Arthritis     Chronic ulcer of ankle 2022    ESBL (extended spectrum beta-lactamase) producing bacteria infection 2024    Frequent UTI 2019    Generalized anxiety disorder 2022    Neurogenic bladder 2022    Osteomyelitis 2022    Paraplegia     Presence of suprapubic catheter 2022    Pure hypercholesterolemia 2022    Retention of urine, unspecified 2019    Spinal cord injury at T1-T6 level 2018       Social History     Socioeconomic History    Marital status:    Tobacco Use    Smoking status: Some Days     Current packs/day: 0.00     Average packs/day: 0.2 packs/day for 41.5 years (10.4 ttl pk-yrs)     Types: Cigars, Cigarettes     Start date: 1982     Last attempt to quit: 2023     Years since quittin.2    Smokeless tobacco: Never   Substance and Sexual Activity    Alcohol use: Not Currently     Alcohol/week: 2.0 standard drinks of alcohol     Types: 2 Cans of beer per week    Drug use: Not Currently     Types: Oxycodone    Sexual activity: Not Currently     Partners: Female     Birth control/protection: None     Social Drivers of Health     Financial Resource Strain: Low Risk  (10/10/2024)    Overall Financial Resource Strain (CARDIA)     Difficulty of Paying Living Expenses: Not hard at all   Food Insecurity: No Food Insecurity (10/10/2024)    Hunger Vital Sign     Worried About Running Out of Food in the Last Year: Never true     Ran Out of Food in the Last Year: Never true   Transportation Needs: No Transportation Needs (10/10/2024)    TRANSPORTATION NEEDS     Transportation : No   Physical Activity: Inactive (2023)    Exercise Vital Sign     Days of Exercise per Week: 0 days     Minutes of Exercise per Session: 0 min    Stress: No Stress Concern Present (10/10/2024)    [employer] [place] [employer] - Occupational Stress Questionnaire     Feeling of Stress : Only a little   Housing Stability: Low Risk  (10/10/2024)    Housing Stability Vital Sign     Unable to Pay for Housing in the Last Year: No     Homeless in the Last Year: No       Precautions:     Allergies as of 10/08/2024 - Reviewed 10/08/2024   Allergen Reaction Noted    Baclofen Itching and Anxiety 06/17/2019       WOC Assessment Details/Treatment        10/17/24 1429   WOCN Assessment   Visit Date 10/17/24   Visit Time 1429   Consult Type Follow Up   WOCN Speciality Wound   WOCN List wound vac   Wound pressure;surgical   Procedure wound vac   Intervention chart review;assessed;applied;orders   Teaching on-going        Wound 05/30/24 0000 Pressure Injury Right Buttocks   Date First Assessed/Time First Assessed: 05/30/24 0000   Primary Wound Type: Pressure Injury  Side: Right  Location: Buttocks  Is this injury device related?: No   Wound Image    Pressure Injury Stage U   Dressing Appearance Intact;Moist drainage   Drainage Amount Moderate   Drainage Characteristics/Odor Serosanguineous   Appearance Pink;Red;White   Tissue loss description Full thickness   Black (%), Wound Tissue Color 0 %   Red (%), Wound Tissue Color 80 %   Yellow (%), Wound Tissue Color 20 %   Periwound Area Intact;Dry   Wound Edges Defined   Wound Length (cm) 5 cm   Wound Width (cm) 5 cm   Wound Depth (cm) 4 cm   Wound Volume (cm^3) 100 cm^3   Wound Surface Area (cm^2) 25 cm^2   Care Cleansed with:;Antimicrobial agent  (vashe)   Dressing Changed  (NPWT set @125mmHg)   Dressing Change Due 10/21/24        Negative Pressure Wound Therapy  10/10/24 1017   Placement Date/Time: 10/10/24 1017   Location: Buttocks  Additional Comments: SN: [id number]   NPWT Type Vacuum Therapy   Therapy Setting NPWT Continuous therapy   Pressure Setting NPWT 125 mmHg   Therapy Interventions NPWT Seal intact    Sponges Inserted NPWT 1;White;Black   Sponges Removed NPWT 1;White;Black     WOCN follow up for wound vac placement to right buttock. Discussed POC w/ nurse Karen. Pt. Is in ICU now and is intubated/sedated and on a ventilator. Family at bedside. Had assistance from Jaime from wound care team w/ wound vac placement. Explained reason for visit. Wound vac placed to right buttock-cleaned w/ vashe, 1 white foam, 1 black foam tracked to another black foam, seal intact w/ no leakage or blockage detected w/ setting at 125mmHg. Pt. Tolerated application well with no signs of pain or discomfort. Nursing to cont. Tx recs and preventative measures. Will follow up on Monday for wound vac change. Pt. On an ICU MARY mattress. 10/17/2024

## 2024-10-17 NOTE — OP NOTE
[facility] - Cath Lab Services  Vascular Surgery  Operative Note    SUMMARY     Date of Procedure: 10/17/2024     Procedure:   Aortogram  Left renal angiography with coil embolization of left renal artery  Right groin central venous line insertion    Surgeons and Role:     * Pati King MD - Primary    Assisting Surgeon: None    Pre-Operative Diagnosis: Hemorrhage [R58]    Post-Operative Diagnosis: Post-Op Diagnosis Codes:     * Hemorrhage [R58]    Anesthesia: Local    Operative Findings (including complications, if any):  Left renal angiogram was performed and did show a patent left artery.  There was active contrast extravasation all along the periphery of left kidney.  Left renal artery coiling was successfully performed.  There was no flow to the kidney on completion angiography.    Description of Technical Procedures:   Mr. Khan was taken to the fluoroscopy suite emergently.  We did continue to give blood products during our procedure.  Heart rate was in the 140s to 150s as we started.  The right groin was prepped and draped in the usual sterile fashion.  B-mode ultrasound was utilized to identify the right common femoral artery and a needle was utilized to cannulate J-wire and a 6 Japanese sheath were placed we then placed a J-wire and advanced an RDC catheter into the abdominal was performed.  It did show a patent aorta, bilateral renal arteries and bilateral common iliac arteries however there was sluggish flow noted.  Glidewire and Glidecath were used with the RDC catheter and the left renal artery was cannulated.  The left renal artery and segmental arteries were patent.  There was diffuse contrast extravasation along the periphery of the left kidney.   Advanced a lantern microcatheter into the distal left main renal artery.  We then deployed multiple coils.  We started with a 4 mm by 30 mm penumbra pod coil.  We then deployed a 5mm x 12 regular coil.  Finally a 5mm x 15mm coil was  placed.  There was complete cessation of flow to the left kidney on completion.  Our guide catheter and catheters were all removed.  Angiography was performed of the right common femoral artery which did show appropriate site of cannulation.  Mynx closure device was utilized successfully.    We then utilized a needle to cannulate the right common femoral vein and a wire was advanced.  Dilation was performed and a 20 cm triple-lumen catheter was advanced.  Contrast was injected through the catheter and this did demonstrate a patent distal port aspirated and flushed easily and was flushed with a saline solution.  The more proximal port did not aspirate well but did saline and contrast appropriately.  All lumens were flushed with saline and locked.  The catheter was affixed to the skin with silk suture.  Chlorhexidine impregnated Tegaderm was utilized to cover the catheter exit site. The patient was transported back to the ICU.    Significant Surgical Tasks Conducted by the Assistant(s), if Applicable: none    Estimated Blood Loss (EBL): * No values recorded between 10/17/2024  7:20 AM and 10/17/2024  8:13 AM *           Implants:   Implant Name Type Inv. Item Serial No.  Lot No. LRB No. Used Action   POD SOCRATES 4 4X30 - [id number] Embolization POD SOCRATES 4 4X30  [employer] [id number] N/A 1 Implanted   COIL COMPLEX STD 5X12 - [id number]  COIL COMPLEX STD 5X12  [employer] [id number] N/A 1 Implanted   COIL SOCRATES COMPLX SFT [id number] - [id number]  COIL [employer] COMPLX SFT [id number]  [employer] [id number] N/A 1 Implanted   KIT CATH PRSS INJ 3 LMN 7FRX20 - [id number]  KIT CATH PRSS INJ 3 LMN 7FRX20  TELE[employer] [id number] N/A 1 Implanted       Specimens:   Specimen (24h ago, onward)      None                    Condition: Good    Disposition: PACU - hemodynamically stable.    Attestation: I was present and scrubbed for the entire procedure.

## 2024-10-17 NOTE — ANESTHESIA PROCEDURE NOTES
Intubation    Date/Time: 10/17/2024 5:36 AM    Performed by: Mt Smith CRNA  Authorized by: Mt Smith CRNA    Intubation:     Induction:  Intravenous    Intubated:  Postinduction    Mask Ventilation:  Easy mask    Attempts:  1    Attempted By:  CRNA    Method of Intubation:  Video laryngoscopy    Blade:  Shields 3    Laryngeal View Grade: Grade I - full view of cords      Difficult Airway Encountered?: No      Complications:  None    Airway Device:  Oral endotracheal tube    Airway Device Size:  8.0    Style/Cuff Inflation:  Cuffed (inflated to minimal occlusive pressure)    Tube secured:  23    Secured at:  The lips    Placement Verified By:  Capnometry    Complicating Factors:  None    Findings Post-Intubation:  BS equal bilateral

## 2024-10-17 NOTE — ASSESSMENT & PLAN NOTE
Patient meets ASPEN criteria for moderate malnutrition of acute illness or injury per RD assessment as evidenced by:  Energy Intake (Malnutrition):  (family denies)  Weight Loss (Malnutrition):  (does not meet criteria)  Subcutaneous Fat (Malnutrition): mild depletion  Muscle Mass (Malnutrition): mild depletion  Fluid Accumulation (Malnutrition): mild        A minimum of two characteristics is recommended for diagnosis of either severe or non-severe malnutrition.

## 2024-10-17 NOTE — CONSULTS
[facility] - 7th Floor ICU  Vascular Surgery  Consult Note    Consults  Subjective:     Chief Complaint/Reason for Admission:  Hemorrhage    History of Present Illness: Mr. Khan is a 59-year-old man with paraplegia, hepatitis-C, and recent hypertension who now has severe hypotension likely secondary to retroperitoneal hemorrhage.  He is currently in the ICU and has received multiple blood blood products in his on multiple pressors including Levophed and vasopressin.  CTA overnight demonstrated a left retroperitoneal hematoma with likely source from the left kidney.  I was consulted for embolization of the left kidney.    Medications Prior to Admission   Medication Sig Dispense Refill Last Dose/Taking    amLODIPine (NORVASC) 10 MG tablet Take 1 tablet (10 mg total) by mouth once daily. 30 tablet 0 Taking    atorvastatin (LIPITOR) 20 MG tablet Take 1 tablet (20 mg total) by mouth nightly. 30 tablet 0 Taking    carvediloL (COREG) 25 MG tablet Take 1 tablet (25 mg total) by mouth 2 (two) times daily with meals. 60 tablet 0 Taking    celecoxib (CELEBREX) 200 MG capsule Take 200 mg by mouth once daily.   Taking    cloNIDine (CATAPRES) 0.1 MG tablet Take 0.1 mg by mouth 3 (three) times daily.   Taking    collagenase (SANTYL) ointment Apply topically twice a week.   Taking    doxycycline (VIBRA-TABS) 100 MG tablet Take 1 tablet (100 mg total) by mouth every 12 (twelve) hours. 30 tablet 0 Taking    famotidine (PEPCID) 20 MG tablet Take 20 mg by mouth 2 (two) times daily.   Taking    fenofibrate (TRICOR) 48 MG tablet Take 48 mg by mouth once daily.   Taking    FLUoxetine 20 MG capsule 1 capsule (20 mg total) by Per G Tube route once daily. 30 capsule 0 Taking    furosemide (LASIX) 20 MG tablet Take 20 mg by mouth once daily.   Taking    gabapentin (NEURONTIN) 400 MG capsule Take 400 mg by mouth every 8 (eight) hours.   Taking    hydrALAZINE (APRESOLINE) 100 MG tablet Take 100 mg by mouth 3 (three) times  daily.   Taking    lisinopriL (PRINIVIL,ZESTRIL) 20 MG tablet Take 20 mg by mouth once daily.   Taking    methocarbamoL (ROBAXIN) 750 MG Tab Take 750 mg by mouth 2 (two) times daily.   Taking    oxybutynin (DITROPAN) 5 MG Tab Take 1 tablet (5 mg total) by mouth 2 (two) times daily.   Taking    oxyCODONE-acetaminophen (PERCOCET)  mg per tablet Take 1 tablet by mouth every 6 (six) hours as needed for Pain. 12 tablet 0 Taking As Needed    pantoprazole (PROTONIX) 40 MG tablet Take 40 mg by mouth 2 (two) times daily.   Taking    traZODone (DESYREL) 100 MG tablet Take 2 tablets (200 mg total) by mouth nightly as needed for Insomnia.   Taking As Needed    XARELTO 20 mg Tab Take 20 mg by mouth once daily.   Taking    ferrous gluconate 324 mg (37.5 mg iron) Tab tablet Take 1 tablet (324 mg total) by mouth 2 (two) times daily with meals. 60 tablet 0        Review of patient's allergies indicates:   Allergen Reactions    Baclofen Itching and Anxiety       Past Medical History:   Diagnosis Date    Arthritis     Chronic ulcer of ankle 05/26/2022    ESBL (extended spectrum beta-lactamase) producing bacteria infection 08/30/2024    Frequent UTI 07/02/2019    Generalized anxiety disorder 05/26/2022    Neurogenic bladder 05/26/2022    Osteomyelitis 05/26/2022    Paraplegia     Presence of suprapubic catheter 05/26/2022    Pure hypercholesterolemia 05/26/2022    Retention of urine, unspecified 08/09/2019    Spinal cord injury at T1-T6 level 04/20/2018     Past Surgical History:   Procedure Laterality Date    APPLICATION OF WOUND VACUUM-ASSISTED CLOSURE DEVICE N/A 10/10/2024    Procedure: APPLICATION, WOUND VAC;  Surgeon: Rob Sinclair MD;  Location: [facility] OR;  Service: General;  Laterality: N/A;    EGD, WITH CLOSED BIOPSY N/A 8/29/2024    Procedure: EGD, WITH CLOSED BIOPSY;  Surgeon: Mikal Segovia MD;  Location: [facility] ENDOSCOPY;  Service: Gastroenterology;  Laterality: N/A;    ESOPHAGOGASTRODUODENOSCOPY N/A 8/29/2024     Procedure: EGD;  Surgeon: Mikal Segovia MD;  Location: [facility] ENDOSCOPY;  Service: Gastroenterology;  Laterality: N/A;    FRACTURE SURGERY  2021    INCISION AND DRAINAGE, LOWER EXTREMITY Right 06/29/2023    Procedure: INCISION AND DRAINAGE, LOWER EXTREMITY;  Surgeon: Prabhu Shen DO;  Location: [facility] OR;  Service: Orthopedics;  Laterality: Right;  supine bone foam wash stuff cultures    INCISION AND DRAINAGE, LOWER EXTREMITY Right 11/30/2023    Procedure: INCISION AND DRAINAGE, LOWER EXTREMITY;  Surgeon: Prabhu Shen DO;  Location: [facility] OR;  Service: Orthopedics;  Laterality: Right;  supine any table bone foam wash stuff possible wound vac    INCISION AND DRAINAGE, LOWER EXTREMITY Right 12/1/2023    Procedure: INCISION AND DRAINAGE, LOWER EXTREMITY;  Surgeon: Prabhu Shen DO;  Location: [facility];  Service: Orthopedics;  Laterality: Right;  supine bone foam vascular bed removal of distal femoral plate, wash stuff, possible AKA    INSERTION OF INTRAMEDULLARY MARISA Right 08/10/2022    Procedure: INSERTION, INTRAMEDULLARY MARISA RIGHT TIBIA;  Surgeon: Jorge Orellana MD;  Location: [facility];  Service: Orthopedics;  Laterality: Right;    JOINT REPLACEMENT  2021    Ankel    PRESSURE ULCER DEBRIDEMENT N/A 10/10/2024    Procedure: DEBRIDEMENT, PRESSURE ULCER;  Surgeon: Rob Sinclair MD;  Location: [facility];  Service: General;  Laterality: N/A;  sacral wound, R buttock wound    REMOVAL OF HARDWARE FROM LOWER EXTREMITY Right 12/1/2023    Procedure: REMOVAL, HARDWARE, LOWER EXTREMITY;  Surgeon: Prabhu Shen DO;  Location: [facility];  Service: Orthopedics;  Laterality: Right;    SPINE SURGERY  March 1st 2018     Family History       Problem Relation (Age of Onset)    No Known Problems Mother, Father          Tobacco Use    Smoking status: Some Days     Current packs/day: 0.00     Average packs/day: 0.2 packs/day for 41.5 years (10.4 ttl pk-yrs)     Types: Cigars, Cigarettes     Start date: 1/30/1982     Last attempt to  quit: 2023     Years since quittin.2    Smokeless tobacco: Never   Substance and Sexual Activity    Alcohol use: Not Currently     Alcohol/week: 2.0 standard drinks of alcohol     Types: 2 Cans of beer per week    Drug use: Not Currently     Types: Oxycodone    Sexual activity: Not Currently     Partners: Female     Birth control/protection: None     Review of Systems  Objective:     Vital Signs (Most Recent):  Temp: 96.3 °F (35.7 °C) (10/17/24 0422)  Pulse: (!) 135 (10/17/24 0515)  Resp: (!) 37 (10/17/24 0515)  BP: 115/76 (10/17/24 0315)  SpO2: 100 % (10/17/24 0515) Vital Signs (24h Range):  Temp:  [93.1 °F (33.9 °C)-97.6 °F (36.4 °C)] 96.3 °F (35.7 °C)  Pulse:  [] 135  Resp:  [12-42] 37  SpO2:  [86 %-100 %] 100 %  BP: ()/() 115/76  Arterial Line BP: ()/(-34-94) 105/59     Weight: 73 kg (160 lb 15 oz)  Body mass index is 23.09 kg/m².        Physical Exam    Black man in ICU bed   Intubated and sedated   Dr. Treadwell in process of placing central line  Distended abdomen   Wound VAC to right posterior thigh reportedly associated with pressure ulcer  Interosseous line to left lower leg  Extremities warm  Unable to palpate left radial pulse   Right radial with art line in place   Unable to palpate pedal pulses      Significant Labs:  BMP:   Recent Labs   Lab 10/17/24  0152   *   K 5.4*   *   CO2 11*   BUN 14.7   CREATININE 1.66*   CALCIUM 7.0*   MG 1.70     CBC:   Recent Labs   Lab 10/17/24  0523 10/17/24  0615   WBC 24.85*  --    RBC 2.43*  --    HGB 7.0* 6.9*   HCT 21.6* 21.3*     --    MCV 88.9  --    MCH 28.8  --    MCHC 32.4*  --        Significant Diagnostics:  CTA of the abdomen reviewed   Left kidney surrounded by large retroperitoneal hematoma   No obvious large blush to explain source but appears to be associated with left kidney  Some atherosclerotic disease is present but vessels are patent - for arterial access to the left kidney    Assessment/Plan:      Active Diagnoses:    Diagnosis Date Noted POA    PRINCIPAL PROBLEM:  Sacral wound [S31.000A] 05/30/2024 Yes      Problems Resolved During this Admission:     Mr. Khan is a 59-year-old man with hepatitis-C, paraplegia, and hypertension who has large left retroperitoneal hematoma.  There is suggestion that this is originating from the left kidney.  He is extremely unstable and remains tachycardic to the 150s 160s on Levophed and vasopressin.  He is on his 4th unit of blood.  We will take him emergently to the cath lab for likely embolization of the left kidney.  Nursing staff reports they have been unable to reach the family.      Thank you for your consult.     Pati King MD  Vascular Surgery  [facility] - 7th Floor ICU

## 2024-10-17 NOTE — PROGRESS NOTES
Inpatient Nutrition Assessment    Admit Date: 10/8/2024   Total duration of encounter: 9 days   Patient Age: 59 y.o.    Nutrition Recommendation/Prescription     When appropriate, start tube feeding at 35 ml/hr, advance to goal after 4 hours:  Peptamen Intense VHP goal rate 70 ml/hr   Malachi BID  to provide (calculations based on estimated 20 hr/d run time)  1580 kcal/d  135 g protein/d  105 g carbohydrate/d  2100 mg potassium/d  952 mg phosphorus/d  1176 ml free water/d    Communication of Recommendations: reviewed with family    Nutrition Assessment     Malnutrition Assessment/Nutrition-Focused Physical Exam    Malnutrition Context: acute illness or injury (10/17/24 1218)  Malnutrition Level: moderate (10/17/24 1218)  Energy Intake (Malnutrition):  (family denies) (10/17/24 1218)  Weight Loss (Malnutrition):  (does not meet criteria) (10/17/24 1218)  Subcutaneous Fat (Malnutrition): mild depletion (10/17/24 1218)     Upper Arm Region (Subcutaneous Fat Loss): mild depletion     Muscle Mass (Malnutrition): mild depletion (10/17/24 1218)     Clavicle Bone Region (Muscle Loss): mild depletion                    Fluid Accumulation (Malnutrition): mild (10/17/24 1218)        A minimum of two characteristics is recommended for diagnosis of either severe or non-severe malnutrition.    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 including ESBL E coli,  Enterobacter, carbapenem resistant Pseudomonas, Enterococcus faecalis     Scheduled Medications:  atorvastatin, 20 mg, Nightly  calcium gluconate 1 g, 1 g, Once  fenofibrate, 48 mg, Daily  FLUoxetine, 20 mg, Daily  gabapentin, 400 mg, Q8H  hydrocortisone sodium succinate, 100 mg, Q8H  magnesium sulfate IVPB, 2 g, Once  [START ON 10/21/2024] mupirocin, , BID  oxybutynin, 5 mg, TID  pantoprazole, 40 mg, Daily  piperacillin-tazobactam (Zosyn) IV (PEDS and ADULTS) (extended infusion is not appropriate), 4.5 g, Q8H  senna-docusate 8.6-50 mg, 1 tablet, BID  sodium chloride 0.9%, 10 mL, Q6H  vancomycin 1,500 mg in D5W 250 mL IVPB (admixture device), 1,500 mg, Once    Continuous Infusions:  EPINEPHrine  fentanyl, Last Rate: 50 mcg/hr (10/17/24 1138)  NORepinephrine bitartrate-D5W, Last Rate: 1.6 mcg/kg/min (10/17/24 0644)  phenylephrine  sodium bicarbonate 150 mEq in D5W 1,000 mL infusion, Last Rate: 100 mL/hr at 10/17/24 0612  vasopressin, Last Rate: Stopped (10/17/24 0211)    PRN Medications:   0.9%  NaCl infusion (for blood administration), , Q24H PRN  0.9%  NaCl infusion (for blood administration), , Q24H PRN  0.9%  NaCl infusion (for blood administration), , Q24H PRN  0.9%  NaCl infusion (for blood administration), , Q24H PRN  0.9%  NaCl infusion (for blood administration), , Q24H PRN  0.9%  NaCl infusion (for blood administration), , Q24H PRN  acetaminophen, 650 mg, Q4H PRN  aluminum-magnesium hydroxide-simethicone, 30 mL, QID PRN  dextrose 10%, 12.5 g, PRN  dextrose 10%, 25 g, PRN  diphenhydrAMINE, 50 mg, Q6H PRN  etomidate, , Code/trauma/sedation Med  glucagon (human recombinant), 1 mg, PRN  glucose, 16 g, PRN  glucose, 24 g, PRN  hyoscyamine, 0.125 mg, Q4H PRN  insulin aspart U-100, 1-10 Units, QID (AC + HS) PRN  melatonin, 6 mg, Nightly PRN  methocarbamoL, 750 mg, TID PRN  morphine, 2 mg, BID PRN  ondansetron, 4 mg, Q4H PRN  oxyCODONE-acetaminophen, 1 tablet, Q4H PRN  polyethylene glycol, 17 g, BID  PRN  prochlorperazine, 5 mg, Q6H PRN  rocuronium, , Code/trauma/sedation Med  sodium chloride 0.9%, 10 mL, PRN  sodium chloride 0.9%, 10 mL, PRN  traZODone, 200 mg, Nightly PRN  vancomycin - pharmacy to dose, , pharmacy to manage frequency    Calorie Containing IV Medications: no significant kcals from medications at this time    Recent Labs   Lab 10/10/24  1344 10/11/24  0636 10/11/24  1204 10/12/24  1048 10/13/24  0913 10/16/24  0353 10/17/24  0152 10/17/24  0523 10/17/24  0615 10/17/24  0912 10/17/24  1007    140  --  138 140 140 135*  --  139 140  --    K 4.5 4.5  --  5.2* 4.8 4.6 5.4*  --  5.6* 5.1  --    CALCIUM 8.2* 8.5*  --  9.0 8.9 8.4* 7.0*  --  9.9 7.5*  --    PHOS  --  3.2  --   --   --   --  7.3*  --  9.5* 8.4*  --    MG  --  1.70  --   --   --  1.70 1.70  --  1.70 1.50*  --     108*  --  105 103 106 108*  --  106 103  --    CO2 24 25  --  24 27 26 11*  --  16* 21*  --    BUN 9.2 14.2  --  16.3 18.9 12.4 14.7  --  16.4 18.4  --    CREATININE 0.88 1.02  --  1.06 0.96 0.90 1.66*  --  1.94* 1.96*  --    EGFRNORACEVR >60 >60  --  >60 >60 >60 47  --  39 38  --    GLUCOSE 128* 167*  --  114 117* 114 323*  --  253* 181*  --    BILITOT 0.2 0.2  --   --   --   --  0.2  --  0.2 0.4  --    ALKPHOS 88 78  --   --   --   --  52  --  41 51  --    ALT 9 9  --   --   --   --  7  --  39 97*  --    AST 17 11  --   --   --   --  14  --  77* 178*  --    ALBUMIN 2.4* 2.3*  --   --   --   --  1.7*  --  1.6* 2.1*  --    WBC 9.59 9.43  --  9.97  --  9.87 23.34* 24.85*  --  22.20*  --    HGB 8.4* 7.9*   < > 11.5*  --  10.4* 5.2* 7.0* 6.9* 9.4* 9.7*   HCT 27.2* 26.0*   < > 35.3*  --  32.9* 16.8* 21.6* 21.3* 27.5* 27.7*    < > = values in this interval not displayed.     Nutrition Orders:  Diet NPO Except for: Medication  Dietary nutrition supplements BID; Malachi - Any flavor    Appetite/Oral Intake: NPO/not applicable  Factors Affecting Nutritional Intake: NPO and on mechanical ventilation  Social Needs Impacting  "Access to Food: none identified  Food/Zoroastrian/Cultural Preferences: none reported  Food Allergies: none reported  Last Bowel Movement: 10/14/24  Wound(s):     Altered Skin Integrity 23 2230 Right lateral Ankle #6-Tissue loss description: Full thickness       Wound 24 0000 Pressure Injury Right Buttocks-Tissue loss description: Full thickness       Altered Skin Integrity 24 0848 upper Sacral spine-Tissue loss description: Full thickness       Wound 24 0800 Other (comment) Left Heel-Tissue loss description: Full thickness    Comments    10/10: Pt was in surgery at time of visit. Having wound debridement with wound vac placement on Stage 4 wound of rt gluteus. Will add Malachi to assist with wound healing. Recommend vitamin regimen for wounds.     10/17/24 Patient transferred to ICU, on ventilator currently, no propofol. Spoke with patient's wife at bedside who reports patient was eating well with a good appetite prior to ICU transfer, she reports bringing him food from home, good intake of Malachi. Tube feeding recommendation provided.    Anthropometrics    Height: 5' 10" (177.8 cm), Height Method: Stated  Last Weight: 79.3 kg (174 lb 13.2 oz) (10/17/24 1212), Weight Method: Bed Scale  BMI (Calculated): 25.1  BMI Classification: normal (BMI 18.5-24.9)        Ideal Body Weight (IBW), Male: 166 lb     % Ideal Body Weight, Male (lb): 96.95 %                 Usual Body Weight (UBW), k.57 kg-77.11 kg  % Usual Body Weight: 109.5  % Weight Change From Usual Weight: 9.27 %  Usual Weight Provided By: family/caregiver    Wt Readings from Last 5 Encounters:   10/17/24 79.3 kg (174 lb 13.2 oz)   24 63.5 kg (139 lb 15.9 oz)   24 71.2 kg (156 lb 15.5 oz)   24 78.5 kg (173 lb 1 oz)   01/10/24 78.5 kg (173 lb 1 oz)     Weight Change(s) Since Admission:   (10/17) took bed weight 79.3 kg during rounds (estimated 4 kg subtracted for equipment), increase noted  Wt Readings from Last 1 " Encounters:   10/17/24 1212 79.3 kg (174 lb 13.2 oz)   10/09/24 0430 73 kg (160 lb 15 oz)   10/08/24 1719 63.5 kg (140 lb)   Admit Weight: 63.5 kg (140 lb) (10/08/24 1719), Weight Method: Stated    Estimated Needs    Weight Used For Calorie Calculations: 77 kg (169 lb 12.1 oz)  Energy Calorie Requirements (kcal): 1,697.72  Energy Need Method: [place]  Total Ve: 9.7 L/m, Temp (24hrs), Av.2 °F (35.1 °C), Min:93 °F (33.9 °C), Max:97.6 °F (36.4 °C)  Vtot (L/Min) for [place] Equation Calculation: 9.7; Max Temp for [place] Equation Calculation: 97.6 °F  Weight Used For Protein Calculations: 77 kg (169 lb 12.1 oz)  Protein Requirements: 116-139 g, 1.5-1.8 g/kg  Fluid Requirements (mL): 1,697.72, 1 ml/kcal  CHO Requirement: 170-212 g, 40-50% of kcal  Last Updated: 10/17     Enteral Nutrition Patient not receiving enteral nutrition at this time.    Parenteral Nutrition Patient not receiving parenteral nutrition support at this time.    Evaluation of Received Nutrient Intake    Calories: not meeting estimated needs  Protein: not meeting estimated needs    Patient Education Not applicable.    Nutrition Diagnosis     PES: Inadequate energy intake related to inability to consume sufficient nutrients as evidenced by less than 80% needs met. (new)  PES: Moderate acute disease or injury related malnutrition related to acute illness as evidenced by mild fat depletion, mild muscle depletion, and edema. (new)    Nutrition Interventions     Intervention(s): modified composition of enteral nutrition, modified rate of enteral nutrition, and collaboration with other providers  Goal: Meet greater than 80% of nutritional needs by follow-up. (new)  Goal: Tolerate enteral feeding at goal rate by follow-up. (new)    Nutrition Goals & Monitoring     Dietitian will monitor: food and beverage intake, energy intake, enteral nutrition intake, parenteral nutrition intake, weight, weight change, electrolyte/renal panel,  beliefs/attitudes, glucose/endocrine profile, and gastrointestinal profile  Discharge planning: too early to determine; pending clinical course  Nutrition Risk/Follow-Up: high (follow-up in 1-4 days)   Please consult if re-assessment needed sooner.

## 2024-10-17 NOTE — H&P
[facility] - 5th Floor Med Surg  Pulmonary Critical Care Note    Patient Name: Bianca Khan  MRN: [id number]  Admission Date: 10/8/2024  Hospital Length of Stay: 9 days  Code Status: Full Code  Attending Provider: Franco Alfredo MD  Primary Care Provider: Areli Vargas PA-C     Subjective:     HPI:   Patient is a 58 y/o male who was initially admitted to hospital medicine service on 10/8/24 for an infected unstageable sacral/right buttock decubitus ulcer. Patient was upgraded to the ICU overnight for hypotension requiring Levophed. Patient's home antihypertensives were restarted yesterday including Lisinopril 20mg qd, Hydralazine 100mg tid, and Clonidine 0.1mg tid. Reported dose-stacking of hydralazine and clonidine per MAR with BP dropping afterward. He received 1.5L NS on the floor with SBP remaining  in the 60s. Levophed was initiated and patient was upgraded to ICU level of care.     On arrival to the ICU, patient is hypothermic, tachycardic, BP 70s/50s on Levophed. Nurse reports syncopal episode lasting seconds with O2 sats dropping to the 80s. Pt recovered after bagging. On exam, he is now awake, alert, answering questions.  Labs obtained and reveal increased WBC of 23.3 from 9.8 yesterday and severe anemia with of H/H 5.2/16.8 from 10.4/32.9. 3u pRBCs and 1u FFP ordered as stat. Further workup pending. Currently stable on oxymask and pressors.     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       Hospital Course/Significant events:  10/8/24: Admitted to hospital medicine   10/17/24:  Upgraded to ICU level of care for hypotension     24 Hour Interval History:  Pending     Past Medical History:   Diagnosis Date    Arthritis     Chronic ulcer of ankle 05/26/2022    ESBL (extended spectrum beta-lactamase) producing bacteria infection 08/30/2024    Frequent UTI 07/02/2019    Generalized anxiety disorder 05/26/2022    Neurogenic bladder 05/26/2022    Osteomyelitis 05/26/2022    Paraplegia     Presence of suprapubic catheter 05/26/2022    Pure hypercholesterolemia 05/26/2022    Retention of urine, unspecified 08/09/2019    Spinal cord injury at T1-T6 level 04/20/2018       Past Surgical History:   Procedure Laterality Date    APPLICATION OF WOUND VACUUM-ASSISTED CLOSURE DEVICE N/A 10/10/2024    Procedure: APPLICATION, WOUND VAC;  Surgeon: Rob Sinclair MD;  Location: [facility];  Service: General;  Laterality: N/A;    EGD, WITH CLOSED BIOPSY N/A 8/29/2024    Procedure: EGD, WITH CLOSED BIOPSY;  Surgeon: Mikal Segovia MD;  Location: [facility] ENDOSCOPY;  Service: Gastroenterology;  Laterality: N/A;    ESOPHAGOGASTRODUODENOSCOPY N/A 8/29/2024    Procedure: EGD;  Surgeon: Mikal Segovia MD;  Location: [facility] ENDOSCOPY;  Service: Gastroenterology;  Laterality: N/A;    FRACTURE SURGERY  2021    INCISION AND DRAINAGE, LOWER EXTREMITY Right 06/29/2023    Procedure: INCISION AND DRAINAGE, LOWER EXTREMITY;  Surgeon: Prabhu Shen DO;  Location: [facility];  Service: Orthopedics;  Laterality: Right;  supine bone foam wash stuff cultures    INCISION AND DRAINAGE, LOWER EXTREMITY Right 11/30/2023    Procedure: INCISION AND DRAINAGE, LOWER EXTREMITY;  Surgeon: Prabhu Shen DO;  Location: [facility] OR;  Service: Orthopedics;  Laterality: Right;  supine any table bone foam wash stuff possible wound vac    INCISION AND DRAINAGE, LOWER EXTREMITY Right 12/1/2023    Procedure: INCISION AND DRAINAGE, LOWER EXTREMITY;  Surgeon: Prabhu Shen DO;  Location: [facility] OR;  Service: Orthopedics;  Laterality: Right;  supine bone  foam vascular bed removal of distal femoral plate, wash stuff, possible AKA    INSERTION OF INTRAMEDULLARY MARISA Right 08/10/2022    Procedure: INSERTION, INTRAMEDULLARY MARISA RIGHT TIBIA;  Surgeon: Jorge Orellana MD;  Location: [facility] OR;  Service: Orthopedics;  Laterality: Right;    JOINT REPLACEMENT      Ankel    PRESSURE ULCER DEBRIDEMENT N/A 10/10/2024    Procedure: DEBRIDEMENT, PRESSURE ULCER;  Surgeon: Rob Sinclair MD;  Location: [facility] OR;  Service: General;  Laterality: N/A;  sacral wound, R buttock wound    REMOVAL OF HARDWARE FROM LOWER EXTREMITY Right 2023    Procedure: REMOVAL, HARDWARE, LOWER EXTREMITY;  Surgeon: Prabhu Shen DO;  Location: [facility] OR;  Service: Orthopedics;  Laterality: Right;    SPINE SURGERY  2018       Social History     Socioeconomic History    Marital status:    Tobacco Use    Smoking status: Some Days     Current packs/day: 0.00     Average packs/day: 0.2 packs/day for 41.5 years (10.4 ttl pk-yrs)     Types: Cigars, Cigarettes     Start date: 1982     Last attempt to quit: 2023     Years since quittin.2    Smokeless tobacco: Never   Substance and Sexual Activity    Alcohol use: Not Currently     Alcohol/week: 2.0 standard drinks of alcohol     Types: 2 Cans of beer per week    Drug use: Not Currently     Types: Oxycodone    Sexual activity: Not Currently     Partners: Female     Birth control/protection: None     Social Drivers of Health     Financial Resource Strain: Low Risk  (10/10/2024)    Overall Financial Resource Strain (CARDIA)     Difficulty of Paying Living Expenses: Not hard at all   Food Insecurity: No Food Insecurity (10/10/2024)    Hunger Vital Sign     Worried About Running Out of Food in the Last Year: Never true     Ran Out of Food in the Last Year: Never true   Transportation Needs: No Transportation Needs (10/10/2024)    TRANSPORTATION NEEDS     Transportation : No   Physical Activity: Inactive (2023)    Exercise Vital  Sign     Days of Exercise per Week: 0 days     Minutes of Exercise per Session: 0 min   Stress: No Stress Concern Present (10/10/2024)    [employer] - Occupational Stress Questionnaire     Feeling of Stress : Only a little   Housing Stability: Low Risk  (10/10/2024)    Housing Stability Vital Sign     Unable to Pay for Housing in the Last Year: No     Homeless in the Last Year: No           Current Outpatient Medications   Medication Instructions    amLODIPine (NORVASC) 10 mg, Oral, Daily    atorvastatin (LIPITOR) 20 mg, Oral, Nightly    carvediloL (COREG) 25 mg, Oral, 2 times daily with meals    celecoxib (CELEBREX) 200 mg, Daily    cloNIDine (CATAPRES) 0.1 mg, 3 times daily    collagenase (SANTYL) ointment Topical (Top), Twice weekly    doxycycline (VIBRA-TABS) 100 mg, Oral, Every 12 hours    famotidine (PEPCID) 20 mg, 2 times daily    fenofibrate (TRICOR) 48 mg, Daily    ferrous gluconate 324 mg, Oral, 2 times daily with meals    FLUoxetine 20 mg, Per G Tube, Daily    furosemide (LASIX) 20 mg, Daily    gabapentin (NEURONTIN) 400 mg, Every 8 hours    hydrALAZINE (APRESOLINE) 100 mg, 3 times daily    lisinopriL (PRINIVIL,ZESTRIL) 20 mg, Daily    methocarbamoL (ROBAXIN) 750 mg, 2 times daily    oxybutynin (DITROPAN) 5 mg, Oral, 2 times daily    oxyCODONE-acetaminophen (PERCOCET)  mg per tablet 1 tablet, Oral, Every 6 hours PRN    pantoprazole (PROTONIX) 40 mg, 2 times daily    traZODone (DESYREL) 200 mg, Oral, Nightly PRN    XARELTO 20 mg, Daily       Current Inpatient Medications   atorvastatin  20 mg Oral Nightly    calcium gluconate 1 g  1 g Intravenous Once    enoxparin  1 mg/kg Subcutaneous Q12H (treatment, non-standard time)    fenofibrate  48 mg Oral Daily    FLUoxetine  20 mg Per G Tube Daily    gabapentin  400 mg Oral Q8H    hydrocortisone sodium succinate  100 mg Intravenous Q8H    magnesium sulfate IVPB  2 g Intravenous Once    [START ON 10/21/2024] mupirocin   Nasal  BID    oxybutynin  5 mg Oral TID    pantoprazole  40 mg Oral Daily    piperacillin-tazobactam (Zosyn) IV (PEDS and ADULTS) (extended infusion is not appropriate)  4.5 g Intravenous Q8H    senna-docusate 8.6-50 mg  1 tablet Oral BID    sodium chloride 0.9%  10 mL Intravenous Q6H    vancomycin 1,500 mg in D5W 250 mL IVPB (admixture device)  1,500 mg Intravenous Once       Current Intravenous Infusions   EPINEPHrine  0-2 mcg/kg/min Intravenous Continuous        fentanyl  0-250 mcg/hr Intravenous Continuous        NORepinephrine bitartrate-D5W  0-3 mcg/kg/min Intravenous Continuous 68.4 mL/hr at 10/17/24 0424 2 mcg/kg/min at 10/17/24 0424    NORepinephrine bitartrate-D5W  0-3 mcg/kg/min Intravenous Continuous 95.8 mL/hr at 10/17/24 0240 0.7 mcg/kg/min at 10/17/24 0240    phenylephrine  0-5 mcg/kg/min Intravenous Continuous        sodium bicarbonate 150 mEq in D5W 1,000 mL infusion   Intravenous Continuous 100 mL/hr at 10/17/24 0502 New Bag at 10/17/24 0502    vasopressin  0.01 Units/min Intravenous Continuous 12 mL/hr at 10/17/24 0143 0.04 Units/min at 10/17/24 0143         Review of Systems   Constitutional:  Negative for fever.   Respiratory:  Negative for cough and hemoptysis.    Cardiovascular:  Negative for chest pain.   Gastrointestinal:  Negative for abdominal pain, nausea and vomiting.          Objective:       Intake/Output Summary (Last 24 hours) at 10/17/2024 0655  Last data filed at 10/17/2024 0422  Gross per 24 hour   Intake 2360 ml   Output 1100 ml   Net 1260 ml         Vital Signs (Most Recent):  Temp: 96.3 °F (35.7 °C) (10/17/24 0422)  Pulse: (!) 135 (10/17/24 0515)  Resp: (!) 37 (10/17/24 0515)  BP: 115/76 (10/17/24 0315)  SpO2: 100 % (10/17/24 0515)  Body mass index is 23.09 kg/m².  Weight: 73 kg (160 lb 15 oz) Vital Signs (24h Range):  Temp:  [93.1 °F (33.9 °C)-97.6 °F (36.4 °C)] 96.3 °F (35.7 °C)  Pulse:  [] 135  Resp:  [12-42] 37  SpO2:  [86 %-100 %] 100 %  BP: ()/()  115/76  Arterial Line BP: ()/(-34-94) 105/59     Physical Exam  Vitals reviewed.   Constitutional:       Appearance: He is ill-appearing. He is not diaphoretic.   HENT:      Head: Normocephalic and atraumatic.   Eyes:      Extraocular Movements: Extraocular movements intact.      Pupils: Pupils are equal, round, and reactive to light.   Cardiovascular:      Rate and Rhythm: Tachycardia present.   Abdominal:      General: There is no distension.      Comments: Mildly distended abdomen   Musculoskeletal:      Right lower leg: No edema.      Left lower leg: No edema.   Skin:     General: Skin is cool and dry.      Coloration: Skin is pale.   Neurological:      Mental Status: He is alert.      Comments: Patient is alert, answers questions appropriately, follows commands           Lines/Drains/Airways       Drain  Duration                  Suprapubic Catheter 10/04/24 0800 12 days              Airway  Duration                  Airway - Non-Surgical 10/17/24 0536 <1 day              Arterial Line  Duration             Arterial Line 10/17/24 0130 Right Radial <1 day              Peripheral Intravenous Line  Duration                  Midline Catheter - Single Lumen 10/12/24 1749 Left brachial vein 4 days                    Significant Labs:    Lab Results   Component Value Date    WBC 24.85 (H) 10/17/2024    HGB 6.9 (L) 10/17/2024    HCT 21.3 (L) 10/17/2024    MCV 88.9 10/17/2024     10/17/2024           BMP  Lab Results   Component Value Date     (L) 10/17/2024    K 5.4 (H) 10/17/2024    CO2 11 (L) 10/17/2024    BUN 14.7 10/17/2024    CREATININE 1.66 (H) 10/17/2024    CALCIUM 7.0 (L) 10/17/2024    AGAP 16.0 10/17/2024    ESTGFRAFRICA 101 05/11/2022    EGFRNONAA >60 04/27/2022         ABG  Recent Labs   Lab 10/17/24  0604   PH 7.350   PO2 416.0*   PCO2 35.0   HCO3 19.3*   POCBASEDEF -5.80*       Mechanical Ventilation Support:         Significant Imaging:  I have reviewed the pertinent imaging within the  past 24 hours.        Assessment/Plan:     Assessment  Hypotension   Normocytic Anemia   Leukocytosis   Sacral wound      Plan  Upgraded to ICU  Continue vasopressors to maintain goal MAP > 60  H/H 5.2/16.8 on ICU admission. Previous H/H 10.4/32.9 yesterday. Receiving 3u pRBCs and 1u FFP.  Recheck H/H post transfusion.   Transfuse for Hemoglobin <7  Will need CT A/P to evaluate for GI bleed when stabilized   Can not r/o septic shock. Patient on chronic suppressive doxycycline for chronic sacral wound infection.   Blood cultures x 2 pending. Initiate Vanc and Zosyn for broad spectrum coverage.   Continue supplemental oxygen to maintain O2 sats > 90%    DVT Prophylaxis: hold in acute anemia   GI Prophylaxis:     32 minutes of critical care was time spent personally by me on the following activities: development of treatment plan with patient or surrogate and bedside caregivers, discussions with consultants, evaluation of patient's response to treatment, examination of patient, ordering and performing treatments and interventions, ordering and review of laboratory studies, ordering and review of radiographic studies, pulse oximetry, re-evaluation of patient's condition.  This critical care time did not overlap with that of any other provider or involve time for any procedures.     Josefina Hodges DO  Pulmonary Critical Care Medicine  [facility] - 5th Floor Med Surg  DOS: 10/17/2024

## 2024-10-17 NOTE — CARE UPDATE
Nursing staff patient has significantly hypotensive.  Notified he was re-started on 3 of his previous home antihypertensives this evening.  Blood pressure did drop soon after.  At bedside he was awake, alert and complaining of dyspnea.  Vitals including temperature, blood glucose, heart rate are within normal limit but systolic blood pressure in the 60s.  As he received multiple doses of these medicines in the very recent period of time suspect it was hypotension will persist.  Bolused a L of fluid but still with systolic in the 60s.  Recommend low-dose pressor for now and monitoring in the ICU tonight, as these blood medications wear out of his system.  We will be happy to take him back to our service in the morning.  Discussed this with resident.  Bedside 12 lead EKG only noted sinus rhythm with a short OK and QT interval of 500.      Critical care DX: Hypotension requiring resuscitation  Critical care time 35 minutes

## 2024-10-18 LAB
ABO + RH BLD: NORMAL
ALBUMIN SERPL-MCNC: 1.8 G/DL (ref 3.4–4.8)
ALBUMIN/GLOB SERPL: 0.6 RATIO (ref 1.1–2)
ALLENS TEST BLOOD GAS (OHS): ABNORMAL
ALLENS TEST BLOOD GAS (OHS): YES
ALP SERPL-CCNC: 80 UNIT/L (ref 40–150)
ALT SERPL-CCNC: 124 UNIT/L (ref 0–55)
ANION GAP SERPL CALC-SCNC: 13 MEQ/L
AST SERPL-CCNC: 266 UNIT/L (ref 5–34)
BASE EXCESS BLD CALC-SCNC: 11.8 MMOL/L (ref -2–2)
BASE EXCESS BLD CALC-SCNC: 9.5 MMOL/L (ref -2–2)
BASOPHILS # BLD AUTO: 0.06 X10(3)/MCL
BASOPHILS NFR BLD AUTO: 0.3 %
BILIRUB SERPL-MCNC: 0.4 MG/DL
BLD PROD TYP BPU: NORMAL
BLOOD GAS SAMPLE TYPE (OHS): ABNORMAL
BLOOD GAS SAMPLE TYPE (OHS): ABNORMAL
BLOOD UNIT EXPIRATION DATE: NORMAL
BLOOD UNIT TYPE CODE: 5100
BUN SERPL-MCNC: 23.7 MG/DL (ref 8.4–25.7)
CA-I BLD-SCNC: 0.94 MMOL/L (ref 1.12–1.23)
CA-I BLD-SCNC: 1.08 MMOL/L (ref 1.12–1.23)
CALCIUM SERPL-MCNC: 7.3 MG/DL (ref 8.8–10)
CHLORIDE SERPL-SCNC: 96 MMOL/L (ref 98–107)
CO2 BLDA-SCNC: 34.8 MMOL/L
CO2 BLDA-SCNC: 35.2 MMOL/L
CO2 SERPL-SCNC: 28 MMOL/L (ref 23–31)
COHGB MFR BLDA: 1.5 % (ref 0.5–1.5)
COHGB MFR BLDA: 3.1 % (ref 0.5–1.5)
CREAT SERPL-MCNC: 2.57 MG/DL (ref 0.72–1.25)
CREAT/UREA NIT SERPL: 9
CROSSMATCH INTERPRETATION: NORMAL
DISPENSE STATUS: NORMAL
DRAWN BY BLOOD GAS (OHS): ABNORMAL
DRAWN BY BLOOD GAS (OHS): ABNORMAL
EOSINOPHIL # BLD AUTO: 0 X10(3)/MCL (ref 0–0.9)
EOSINOPHIL NFR BLD AUTO: 0 %
ERYTHROCYTE [DISTWIDTH] IN BLOOD BY AUTOMATED COUNT: 15.1 % (ref 11.5–17)
ERYTHROCYTE [DISTWIDTH] IN BLOOD BY AUTOMATED COUNT: 15.4 % (ref 11.5–17)
ERYTHROCYTE [DISTWIDTH] IN BLOOD BY AUTOMATED COUNT: 15.8 % (ref 11.5–17)
GFR SERPLBLD CREATININE-BSD FMLA CKD-EPI: 28 ML/MIN/1.73/M2
GLOBULIN SER-MCNC: 2.8 GM/DL (ref 2.4–3.5)
GLUCOSE SERPL-MCNC: 208 MG/DL (ref 82–115)
HCO3 BLDA-SCNC: 33.5 MMOL/L (ref 22–26)
HCO3 BLDA-SCNC: 34.2 MMOL/L (ref 22–26)
HCT VFR BLD AUTO: 18.9 % (ref 42–52)
HCT VFR BLD AUTO: 19.5 % (ref 42–52)
HCT VFR BLD AUTO: 19.7 % (ref 42–52)
HCT VFR BLD AUTO: 25.5 % (ref 42–52)
HCT VFR BLD AUTO: 31.2 % (ref 42–52)
HGB BLD-MCNC: 11 G/DL (ref 14–18)
HGB BLD-MCNC: 6.9 G/DL (ref 14–18)
HGB BLD-MCNC: 7.1 G/DL (ref 14–18)
HGB BLD-MCNC: 7.1 G/DL (ref 14–18)
HGB BLD-MCNC: 9.1 G/DL (ref 14–18)
IMM GRANULOCYTES # BLD AUTO: 0.36 X10(3)/MCL (ref 0–0.04)
IMM GRANULOCYTES NFR BLD AUTO: 1.7 %
INHALED O2 CONCENTRATION: 30 %
INHALED O2 CONCENTRATION: 30 %
LYMPHOCYTES # BLD AUTO: 2.45 X10(3)/MCL (ref 0.6–4.6)
LYMPHOCYTES NFR BLD AUTO: 11.3 %
MAGNESIUM SERPL-MCNC: 1.9 MG/DL (ref 1.6–2.6)
MCH RBC QN AUTO: 28.9 PG (ref 27–31)
MCH RBC QN AUTO: 28.9 PG (ref 27–31)
MCH RBC QN AUTO: 30.2 PG (ref 27–31)
MCHC RBC AUTO-ENTMCNC: 35.3 G/DL (ref 33–36)
MCHC RBC AUTO-ENTMCNC: 35.7 G/DL (ref 33–36)
MCHC RBC AUTO-ENTMCNC: 36 G/DL (ref 33–36)
MCV RBC AUTO: 81 FL (ref 80–94)
MCV RBC AUTO: 82.1 FL (ref 80–94)
MCV RBC AUTO: 83.8 FL (ref 80–94)
MECH RR (OHS): 20 B/MIN
MECH RR (OHS): 24 B/MIN
METHGB MFR BLDA: 0.5 % (ref 0.4–1.5)
METHGB MFR BLDA: 0.6 % (ref 0.4–1.5)
MODE (OHS): AC
MODE (OHS): AC
MONOCYTES # BLD AUTO: 1 X10(3)/MCL (ref 0.1–1.3)
MONOCYTES NFR BLD AUTO: 4.6 %
NEUTROPHILS # BLD AUTO: 17.9 X10(3)/MCL (ref 2.1–9.2)
NEUTROPHILS NFR BLD AUTO: 82.1 %
NRBC BLD AUTO-RTO: 0 %
NRBC BLD AUTO-RTO: 0.1 %
NRBC BLD AUTO-RTO: 0.1 %
O2 HB BLOOD GAS (OHS): 94.8 % (ref 94–97)
O2 HB BLOOD GAS (OHS): 94.9 % (ref 94–97)
OHS QRS DURATION: 76 MS
OHS QTC CALCULATION: 549 MS
OXYGEN DEVICE BLOOD GAS (OHS): ABNORMAL
OXYGEN DEVICE BLOOD GAS (OHS): ABNORMAL
OXYHGB MFR BLDA: 11.4 G/DL (ref 12–16)
OXYHGB MFR BLDA: 6.9 G/DL (ref 12–16)
PCO2 BLDA: 34 MMHG (ref 35–45)
PCO2 BLDA: 42 MMHG (ref 35–45)
PEEP RESPIRATORY: 5 CMH2O
PEEP RESPIRATORY: 5 CMH2O
PH BLDA: 7.51 [PH] (ref 7.35–7.45)
PH BLDA: 7.61 [PH] (ref 7.35–7.45)
PHOSPHATE SERPL-MCNC: 5.4 MG/DL (ref 2.3–4.7)
PLATELET # BLD AUTO: 149 X10(3)/MCL (ref 130–400)
PLATELET # BLD AUTO: 157 X10(3)/MCL (ref 130–400)
PLATELET # BLD AUTO: 201 X10(3)/MCL (ref 130–400)
PMV BLD AUTO: 10.2 FL (ref 7.4–10.4)
PMV BLD AUTO: 9.8 FL (ref 7.4–10.4)
PMV BLD AUTO: 9.8 FL (ref 7.4–10.4)
PO2 BLDA: 68 MMHG (ref 80–100)
PO2 BLDA: 71 MMHG (ref 80–100)
POCT GLUCOSE: 104 MG/DL (ref 70–110)
POCT GLUCOSE: 151 MG/DL (ref 70–110)
POCT GLUCOSE: 203 MG/DL (ref 70–110)
POTASSIUM BLOOD GAS (OHS): 3.2 MMOL/L (ref 3.5–5)
POTASSIUM BLOOD GAS (OHS): 3.3 MMOL/L (ref 3.5–5)
POTASSIUM SERPL-SCNC: 3.6 MMOL/L (ref 3.5–5.1)
PROT SERPL-MCNC: 4.6 GM/DL (ref 5.8–7.6)
RBC # BLD AUTO: 2.35 X10(6)/MCL (ref 4.7–6.1)
RBC # BLD AUTO: 3.15 X10(6)/MCL (ref 4.7–6.1)
RBC # BLD AUTO: 3.8 X10(6)/MCL (ref 4.7–6.1)
SAMPLE SITE BLOOD GAS (OHS): ABNORMAL
SAMPLE SITE BLOOD GAS (OHS): ABNORMAL
SAO2 % BLDA: 95.6 %
SAO2 % BLDA: 96.2 %
SODIUM BLOOD GAS (OHS): 128 MMOL/L (ref 137–145)
SODIUM BLOOD GAS (OHS): 130 MMOL/L (ref 137–145)
SODIUM SERPL-SCNC: 137 MMOL/L (ref 136–145)
SPONT+MECH VT ON VENT: 450 ML
SPONT+MECH VT ON VENT: 450 ML
UNIT NUMBER: NORMAL
VANCOMYCIN SERPL-MCNC: 21.3 UG/ML (ref 15–20)
WBC # BLD AUTO: 19.32 X10(3)/MCL (ref 4.5–11.5)
WBC # BLD AUTO: 21.77 X10(3)/MCL (ref 4.5–11.5)
WBC # BLD AUTO: 23.38 X10(3)/MCL (ref 4.5–11.5)

## 2024-10-18 PROCEDURE — P9016 RBC LEUKOCYTES REDUCED: HCPCS

## 2024-10-18 PROCEDURE — 85025 COMPLETE CBC W/AUTO DIFF WBC: CPT

## 2024-10-18 PROCEDURE — 36415 COLL VENOUS BLD VENIPUNCTURE: CPT

## 2024-10-18 PROCEDURE — 63600175 PHARM REV CODE 636 W HCPCS: Performed by: STUDENT IN AN ORGANIZED HEALTH CARE EDUCATION/TRAINING PROGRAM

## 2024-10-18 PROCEDURE — 25000003 PHARM REV CODE 250: Performed by: STUDENT IN AN ORGANIZED HEALTH CARE EDUCATION/TRAINING PROGRAM

## 2024-10-18 PROCEDURE — 27100171 HC OXYGEN HIGH FLOW UP TO 24 HOURS

## 2024-10-18 PROCEDURE — 84100 ASSAY OF PHOSPHORUS: CPT

## 2024-10-18 PROCEDURE — 86923 COMPATIBILITY TEST ELECTRIC: CPT

## 2024-10-18 PROCEDURE — 85018 HEMOGLOBIN: CPT

## 2024-10-18 PROCEDURE — 80053 COMPREHEN METABOLIC PANEL: CPT

## 2024-10-18 PROCEDURE — 25000003 PHARM REV CODE 250

## 2024-10-18 PROCEDURE — 80202 ASSAY OF VANCOMYCIN: CPT

## 2024-10-18 PROCEDURE — 36415 COLL VENOUS BLD VENIPUNCTURE: CPT | Performed by: STUDENT IN AN ORGANIZED HEALTH CARE EDUCATION/TRAINING PROGRAM

## 2024-10-18 PROCEDURE — 63600175 PHARM REV CODE 636 W HCPCS: Mod: JG | Performed by: INTERNAL MEDICINE

## 2024-10-18 PROCEDURE — 25000003 PHARM REV CODE 250: Performed by: INTERNAL MEDICINE

## 2024-10-18 PROCEDURE — P9016 RBC LEUKOCYTES REDUCED: HCPCS | Performed by: SPECIALIST

## 2024-10-18 PROCEDURE — 37799 UNLISTED PX VASCULAR SURGERY: CPT

## 2024-10-18 PROCEDURE — 99900031 HC PATIENT EDUCATION (STAT)

## 2024-10-18 PROCEDURE — 63600175 PHARM REV CODE 636 W HCPCS

## 2024-10-18 PROCEDURE — 85027 COMPLETE CBC AUTOMATED: CPT | Performed by: STUDENT IN AN ORGANIZED HEALTH CARE EDUCATION/TRAINING PROGRAM

## 2024-10-18 PROCEDURE — 63600175 PHARM REV CODE 636 W HCPCS: Performed by: INTERNAL MEDICINE

## 2024-10-18 PROCEDURE — A4216 STERILE WATER/SALINE, 10 ML: HCPCS | Performed by: STUDENT IN AN ORGANIZED HEALTH CARE EDUCATION/TRAINING PROGRAM

## 2024-10-18 PROCEDURE — 83735 ASSAY OF MAGNESIUM: CPT

## 2024-10-18 PROCEDURE — 94760 N-INVAS EAR/PLS OXIMETRY 1: CPT

## 2024-10-18 PROCEDURE — 20000000 HC ICU ROOM

## 2024-10-18 PROCEDURE — 94003 VENT MGMT INPAT SUBQ DAY: CPT

## 2024-10-18 PROCEDURE — 82803 BLOOD GASES ANY COMBINATION: CPT

## 2024-10-18 PROCEDURE — 94761 N-INVAS EAR/PLS OXIMETRY MLT: CPT | Mod: XB

## 2024-10-18 PROCEDURE — 27000207 HC ISOLATION

## 2024-10-18 PROCEDURE — 99900035 HC TECH TIME PER 15 MIN (STAT)

## 2024-10-18 PROCEDURE — 27200966 HC CLOSED SUCTION SYSTEM

## 2024-10-18 RX ORDER — CALCIUM GLUCONATE 20 MG/ML
1 INJECTION, SOLUTION INTRAVENOUS
Status: COMPLETED | OUTPATIENT
Start: 2024-10-18 | End: 2024-10-18

## 2024-10-18 RX ORDER — CIPROFLOXACIN 2 MG/ML
400 INJECTION, SOLUTION INTRAVENOUS
Status: DISPENSED | OUTPATIENT
Start: 2024-10-18 | End: 2024-11-11

## 2024-10-18 RX ORDER — HYDROCODONE BITARTRATE AND ACETAMINOPHEN 500; 5 MG/1; MG/1
TABLET ORAL
Status: DISCONTINUED | OUTPATIENT
Start: 2024-10-18 | End: 2024-10-28

## 2024-10-18 RX ORDER — CIPROFLOXACIN 2 MG/ML
400 INJECTION, SOLUTION INTRAVENOUS
Status: DISCONTINUED | OUTPATIENT
Start: 2024-10-18 | End: 2024-10-18 | Stop reason: RX

## 2024-10-18 RX ORDER — PANTOPRAZOLE SODIUM 40 MG/10ML
40 INJECTION, POWDER, LYOPHILIZED, FOR SOLUTION INTRAVENOUS EVERY 24 HOURS
Status: DISCONTINUED | OUTPATIENT
Start: 2024-10-18 | End: 2024-11-13

## 2024-10-18 RX ADMIN — SENNOSIDES AND DOCUSATE SODIUM 1 TABLET: 50; 8.6 TABLET ORAL at 08:10

## 2024-10-18 RX ADMIN — INSULIN ASPART 2 UNITS: 100 INJECTION, SOLUTION INTRAVENOUS; SUBCUTANEOUS at 09:10

## 2024-10-18 RX ADMIN — SODIUM BICARBONATE: 84 INJECTION, SOLUTION INTRAVENOUS at 01:10

## 2024-10-18 RX ADMIN — OXYBUTYNIN CHLORIDE 5 MG: 5 TABLET ORAL at 02:10

## 2024-10-18 RX ADMIN — GABAPENTIN 400 MG: 300 CAPSULE ORAL at 02:10

## 2024-10-18 RX ADMIN — CIPROFLOXACIN 400 MG: 2 INJECTION, SOLUTION INTRAVENOUS at 09:10

## 2024-10-18 RX ADMIN — INSULIN ASPART 4 UNITS: 100 INJECTION, SOLUTION INTRAVENOUS; SUBCUTANEOUS at 05:10

## 2024-10-18 RX ADMIN — SODIUM CHLORIDE, PRESERVATIVE FREE 10 ML: 5 INJECTION INTRAVENOUS at 12:10

## 2024-10-18 RX ADMIN — OXYBUTYNIN CHLORIDE 5 MG: 5 TABLET ORAL at 08:10

## 2024-10-18 RX ADMIN — ACETAMINOPHEN 650 MG: 325 TABLET, FILM COATED ORAL at 12:10

## 2024-10-18 RX ADMIN — GABAPENTIN 400 MG: 300 CAPSULE ORAL at 09:10

## 2024-10-18 RX ADMIN — ATORVASTATIN CALCIUM 20 MG: 10 TABLET, FILM COATED ORAL at 08:10

## 2024-10-18 RX ADMIN — SODIUM CHLORIDE, PRESERVATIVE FREE 10 ML: 5 INJECTION INTRAVENOUS at 06:10

## 2024-10-18 RX ADMIN — HYDROCORTISONE SODIUM SUCCINATE 100 MG: 100 INJECTION, POWDER, FOR SOLUTION INTRAMUSCULAR; INTRAVENOUS at 02:10

## 2024-10-18 RX ADMIN — VASOPRESSIN 0.1 UNITS/MIN: 20 INJECTION INTRAVENOUS at 02:10

## 2024-10-18 RX ADMIN — Medication 100 MCG/HR: at 06:10

## 2024-10-18 RX ADMIN — GABAPENTIN 400 MG: 300 CAPSULE ORAL at 05:10

## 2024-10-18 RX ADMIN — FLUOXETINE HYDROCHLORIDE 20 MG: 20 CAPSULE ORAL at 08:10

## 2024-10-18 RX ADMIN — FENOFIBRATE 48 MG: 48 TABLET, FILM COATED ORAL at 08:10

## 2024-10-18 RX ADMIN — AMPICILLIN SODIUM AND SULBACTAM SODIUM 3 G: 2; 1 INJECTION, POWDER, FOR SOLUTION INTRAMUSCULAR; INTRAVENOUS at 02:10

## 2024-10-18 RX ADMIN — CALCIUM GLUCONATE 1 G: 20 INJECTION, SOLUTION INTRAVENOUS at 10:10

## 2024-10-18 RX ADMIN — HYDROCORTISONE SODIUM SUCCINATE 100 MG: 100 INJECTION, POWDER, FOR SOLUTION INTRAMUSCULAR; INTRAVENOUS at 09:10

## 2024-10-18 RX ADMIN — HYDROCORTISONE SODIUM SUCCINATE 100 MG: 100 INJECTION, POWDER, FOR SOLUTION INTRAMUSCULAR; INTRAVENOUS at 05:10

## 2024-10-18 RX ADMIN — SODIUM CHLORIDE, PRESERVATIVE FREE 10 ML: 5 INJECTION INTRAVENOUS at 05:10

## 2024-10-18 RX ADMIN — PANTOPRAZOLE SODIUM 40 MG: 40 INJECTION, POWDER, LYOPHILIZED, FOR SOLUTION INTRAVENOUS at 08:10

## 2024-10-18 RX ADMIN — CALCIUM GLUCONATE 1 G: 20 INJECTION, SOLUTION INTRAVENOUS at 08:10

## 2024-10-18 RX ADMIN — PIPERACILLIN AND TAZOBACTAM 4.5 G: 4; .5 INJECTION, POWDER, LYOPHILIZED, FOR SOLUTION INTRAVENOUS; PARENTERAL at 03:10

## 2024-10-18 RX ADMIN — CIPROFLOXACIN 400 MG: 2 INJECTION, SOLUTION INTRAVENOUS at 11:10

## 2024-10-18 RX ADMIN — CALCIUM GLUCONATE 1 G: 20 INJECTION, SOLUTION INTRAVENOUS at 09:10

## 2024-10-18 RX ADMIN — AMPICILLIN SODIUM AND SULBACTAM SODIUM 3 G: 2; 1 INJECTION, POWDER, FOR SOLUTION INTRAMUSCULAR; INTRAVENOUS at 09:10

## 2024-10-18 NOTE — PROGRESS NOTES
Acute Care Surgery   Progress Note  Admit Date: 10/8/2024  HD#10  POD#1 Day Post-Op    Subjective:   Interval history:  S/p OR sacral wound debridement 10/10.   S/p Left renal artery embolization 10/17  Afebrile with liable pressures   H/H stable at 7 post op       Home Meds:  Current Outpatient Medications   Medication Instructions    amLODIPine (NORVASC) 10 mg, Oral, Daily    atorvastatin (LIPITOR) 20 mg, Oral, Nightly    carvediloL (COREG) 25 mg, Oral, 2 times daily with meals    celecoxib (CELEBREX) 200 mg, Daily    cloNIDine (CATAPRES) 0.1 mg, 3 times daily    collagenase (SANTYL) ointment Topical (Top), Twice weekly    doxycycline (VIBRA-TABS) 100 mg, Oral, Every 12 hours    famotidine (PEPCID) 20 mg, 2 times daily    fenofibrate (TRICOR) 48 mg, Daily    ferrous gluconate 324 mg, Oral, 2 times daily with meals    FLUoxetine 20 mg, Per G Tube, Daily    furosemide (LASIX) 20 mg, Daily    gabapentin (NEURONTIN) 400 mg, Every 8 hours    hydrALAZINE (APRESOLINE) 100 mg, 3 times daily    lisinopriL (PRINIVIL,ZESTRIL) 20 mg, Daily    methocarbamoL (ROBAXIN) 750 mg, 2 times daily    oxybutynin (DITROPAN) 5 mg, Oral, 2 times daily    oxyCODONE-acetaminophen (PERCOCET)  mg per tablet 1 tablet, Oral, Every 6 hours PRN    pantoprazole (PROTONIX) 40 mg, 2 times daily    traZODone (DESYREL) 200 mg, Oral, Nightly PRN    XARELTO 20 mg, Daily      Scheduled Meds:   ampicillin-sulbactam  3 g Intravenous Q8H    atorvastatin  20 mg Per NG tube Nightly    calcium gluconate 1 g  1 g Intravenous Once    ciprofloxacin  400 mg Intravenous Q12H    fenofibrate  48 mg Per NG tube Daily    FLUoxetine  20 mg Per G Tube Daily    gabapentin  400 mg Per NG tube Q8H    hydrocortisone sodium succinate  100 mg Intravenous Q8H    [START ON 10/21/2024] mupirocin   Nasal BID    oxybutynin  5 mg Per NG tube TID    pantoprazole  40 mg Intravenous Daily    senna-docusate 8.6-50 mg  1 tablet Oral BID    sodium chloride 0.9%  10 mL Intravenous  Q6H     Continuous Infusions:   fentanyl  0-250 mcg/hr Intravenous Continuous 10 mL/hr at 10/18/24 0628 100 mcg/hr at 10/18/24 0628    vasopressin  0.1 Units/min Intravenous Continuous 30 mL/hr at 10/18/24 0255 0.1 Units/min at 10/18/24 0255     PRN Meds:  Current Facility-Administered Medications:     0.9%  NaCl infusion (for blood administration), , Intravenous, Q24H PRN    0.9%  NaCl infusion (for blood administration), , Intravenous, Q24H PRN    0.9%  NaCl infusion (for blood administration), , Intravenous, Q24H PRN    0.9%  NaCl infusion (for blood administration), , Intravenous, Q24H PRN    0.9%  NaCl infusion (for blood administration), , Intravenous, Q24H PRN    0.9%  NaCl infusion (for blood administration), , Intravenous, Q24H PRN    0.9%  NaCl infusion (for blood administration), , Intravenous, Q24H PRN    0.9%  NaCl infusion (for blood administration), , Intravenous, Q24H PRN    acetaminophen, 650 mg, Oral, Q4H PRN    aluminum-magnesium hydroxide-simethicone, 30 mL, Oral, QID PRN    dextrose 10%, 12.5 g, Intravenous, PRN    dextrose 10%, 25 g, Intravenous, PRN    diphenhydrAMINE, 50 mg, Intravenous, Q6H PRN    etomidate, , Intravenous, Code/trauma/sedation Med    glucagon (human recombinant), 1 mg, Intramuscular, PRN    glucose, 16 g, Oral, PRN    glucose, 24 g, Oral, PRN    hyoscyamine, 0.125 mg, Sublingual, Q4H PRN    insulin aspart U-100, 1-10 Units, Subcutaneous, QID (AC + HS) PRN    melatonin, 6 mg, Oral, Nightly PRN    methocarbamoL, 750 mg, Oral, TID PRN    morphine, 2 mg, Intravenous, BID PRN    ondansetron, 4 mg, Intravenous, Q4H PRN    oxyCODONE-acetaminophen, 1 tablet, Oral, Q4H PRN    polyethylene glycol, 17 g, Oral, BID PRN    prochlorperazine, 5 mg, Intravenous, Q6H PRN    rocuronium, , Intravenous, Code/trauma/sedation Med    sodium chloride 0.9%, 10 mL, Intravenous, PRN    Flushing PICC/Midline Protocol, , , Until Discontinued **AND** sodium chloride 0.9%, 10 mL, Intravenous, Q6H **AND**  "sodium chloride 0.9%, 10 mL, Intravenous, PRN    traZODone, 200 mg, Oral, Nightly PRN    Pharmacy to dose Vancomycin consult, , , Once **AND** vancomycin - pharmacy to dose, , Intravenous, pharmacy to manage frequency     Objective:     VITAL SIGNS: 24 HR MIN & MAX LAST   Temp  Min: 98.8 °F (37.1 °C)  Max: 100.4 °F (38 °C)  98.8 °F (37.1 °C)   BP  Min: 74/48  Max: 151/104  102/73    Pulse  Min: 91  Max: 110  100    Resp  Min: 20  Max: 36  20    SpO2  Min: 96 %  Max: 100 %  99 %      HT: 5' 10" (177.8 cm)  WT: 79.3 kg (174 lb 13.2 oz)  BMI: 25.1     Intake/output:  Intake/Output - Last 3 Shifts         10/16 0700  10/17 0659 10/17 0700  10/18 0659 10/18 0700  10/19 0659    P.O. 760      I.V. (mL/kg)  4675.8 (59)     Blood 1600 4963.7 450    IV Piggyback  524.8     Total Intake(mL/kg) 2360 (32.3) 68259.3 (128.2) 450 (5.7)    Urine (mL/kg/hr) 1500 (0.9) 975 (0.5) 100 (0.3)    Drains  200     Other  0     Stool 0 0     Total Output 1500 1175 100    Net +860 +8989.3 +350           Stool Occurrence 1 x 1 x             Intake/Output Summary (Last 24 hours) at 10/18/2024 1201  Last data filed at 10/18/2024 0915  Gross per 24 hour   Intake 4086.38 ml   Output 975 ml   Net 3111.38 ml           Lines/drains/airway:       Peripheral IV - Single Lumen 10/08/24 1753 18 G Anterior;Left;Proximal Forearm (Active)   Site Assessment Clean;Dry;Intact;No redness;No swelling 10/12/24 0400   Extremity Assessment Distal to IV No abnormal discoloration;No redness;No swelling 10/10/24 1043   Line Status Blood return noted 10/12/24 0400   Dressing Status Clean;Intact;Dry 10/12/24 0400   Dressing Intervention Integrity maintained 10/11/24 1600   Number of days: 3            Midline Catheter - Single Lumen 08/28/24 0251 Left brachial vein (Active)   Number of days: 45            Suprapubic Catheter 10/04/24 0800 (Active)   Dressing no dressing 10/10/24 1043   Characteristics no redness;no warmth;no drainage;dry 10/10/24 1348   Urine Color " "Yellow 10/10/24 1043   Collection Container Standard drainage bag 10/10/24 1043   Securement secured to upper leg w/ adhesive device 10/10/24 1043   Site Assessment Clean;Intact 10/11/24 1906   Catheter Care Performed yes 10/09/24 1940   Number of days: 7       Physical examination:  Gen: NAD, AAOx3, answering questions appropriately  HEENT:  CV: RR  Resp: NWOB  Abd: S/NT/ND  Ext: moving all extremities spontaneously and purposefully  Neuro: CN II-XII grossly intact  Skin/wounds:    Labs:  Renal:  Recent Labs     10/17/24  0152 10/17/24  0615 10/17/24  0912 10/18/24  0241   BUN 14.7 16.4 18.4 23.7   CREATININE 1.66* 1.94* 1.96* 2.57*     No results for input(s): "LACTIC" in the last 72 hours.  FENGI:  Recent Labs     10/17/24  0152 10/17/24  0615 10/17/24  0912 10/18/24  0241   * 139 140 137   K 5.4* 5.6* 5.1 3.6   * 106 103 96*   CO2 11* 16* 21* 28   CALCIUM 7.0* 9.9 7.5* 7.3*   MG 1.70 1.70 1.50* 1.90   PHOS 7.3* 9.5* 8.4* 5.4*   ALBUMIN 1.7* 1.6* 2.1* 1.8*   BILITOT 0.2 0.2 0.4 0.4   AST 14 77* 178* 266*   ALKPHOS 52 41 51 80   ALT 7 39 97* 124*     Heme:  Recent Labs     10/17/24  0152 10/17/24  0523 10/17/24  0615 10/17/24  0912 10/17/24  1007 10/17/24  2203 10/17/24  2356 10/18/24  0241 10/18/24  0555   HGB 5.2* 7.0* 6.9* 9.4*   < > 7.3* 7.1* 7.1* 6.9*   HCT 16.8* 21.6* 21.3* 27.5*   < > 19.8* 19.5* 19.7* 18.9*    216  --  222  --   --   --  201  --    INR  --   --  1.7* 1.7*  --   --   --   --   --     < > = values in this interval not displayed.     ID:  Recent Labs     10/17/24  0152 10/17/24  0523 10/17/24  0912 10/18/24  0241   WBC 23.34* 24.85* 22.20* 21.77*     CBG:  Recent Labs     10/17/24  0152 10/17/24  0615 10/17/24  0912 10/18/24  0241   GLUCOSE 323* 253* 181* 208*      Cardiovascular:  No results for input(s): "TROPONINI", "CKTOTAL", "CKMB", "BNP" in the last 168 hours.  ABG:  Recent Labs   Lab 10/18/24  1132   PH 7.510*   PO2 71.0*   PCO2 42.0   HCO3 33.5*      I have reviewed " all pertinent lab results within the past 24 hours.    Imaging:  X-Ray Chest 1 View for Line/Tube Placement   Final Result      Increased left retrocardiac density and silhouetting of the left hemidiaphragm might be related to an infiltrate/atelectasis.      Atelectatic changes at the left base.      Interval insertion of endotracheal tube and nasogastric tube.      No other significant abnormality         Electronically signed by: Rob Arauz   Date:    10/17/2024   Time:    08:30      CTA Chest Abdomen Pelvis   Final Result      CT Pelvis With IV Contrast NO Oral Contrast   Final Result      As above.  Worsening inflammatory change of the sacral decubitus ulcers with gas now identified inferior to the right pubic ramus.  Stool noted throughout the colon as may be seen with constipation.  Pyelonephritis is not excluded on the basis of this exam.         Electronically signed by: Konrad Robertson   Date:    10/08/2024   Time:    20:00      MRI Hip With Contrast Right    (Results Pending)   Anesthesia US Guide Vascular Access    (Results Pending)      I have reviewed all pertinent imaging results/findings within the past 24 hours.    Micro/Path/Other:  Microbiology Results (last 7 days)       Procedure Component Value Units Date/Time    Blood Culture [[id number]]  (Normal) Collected: 10/17/24 0942    Order Status: Completed Specimen: Blood Updated: 10/18/24 1101     Blood Culture No Growth At 24 Hours    Blood Culture [[id number]]  (Normal) Collected: 10/17/24 0942    Order Status: Completed Specimen: Blood Updated: 10/18/24 1101     Blood Culture No Growth At 24 Hours    Blood Culture [[id number]]     Order Status: Canceled Specimen: Blood     Blood Culture [[id number]]     Order Status: Canceled Specimen: Blood     Blood culture #2 **CANNOT BE ORDERED STAT** [[id number]]  (Normal) Collected: 10/09/24 0521    Order Status: Completed Specimen: Blood from Arm, Right Updated: 10/14/24 1101     Blood Culture No  Growth at 5 days    Blood culture #1 **CANNOT BE ORDERED STAT** [[id number]]  (Normal) Collected: 10/08/24 1817    Order Status: Completed Specimen: Blood Updated: 10/13/24 1900     Blood Culture No Growth at 5 days    Wound Culture [[id number]]  (Abnormal)  (Susceptibility) Collected: 10/09/24 1011    Order Status: Completed Specimen: Wound from Sacral Updated: 10/12/24 1219     Wound Culture Many ACINETOBACTER BAUMANNII     Comment: CRAB (Carbapenem-resistant Acinetobacter baumannii)         Many Escherichia coli ESBL      Moderate Enterococcus faecalis    Anaerobic Culture [[id number]]  (Abnormal) Collected: 10/09/24 1011    Order Status: Completed Specimen: Wound from Sacral Updated: 10/12/24 1123     Anaerobe Culture Bacteroides thetaiotaomicron      Peptoniphilus asaccharolyticus     Comment: Anaerobic sensitivity is not usually indicated except on single isolates from sterile body sites.              Pathology Results  (Last 7 days)      None             Assessment & Plan:   Bianca Khan is a 59 y.o. 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 S/p OR sacral wound debridement 10/10. Surgery re-consulted for left renal rupture with subcapsular hematoma on CT scan s/p Left renal artery embolization with IR     - Recommend transfuse H/H < 7  - Recommend H/H daily   - Remainder per primary    Surgery signing off. Please call with any additional questions or concerns.         Brian Davila MD MPH  [facility]

## 2024-10-18 NOTE — CONSULTS
Infectious Diseases Consultation    Inpatient consult to Infectious Diseases  Consult performed by: Magdi Long FNP  Consult ordered by: Saranya Jiménez MD      HPI:   58 y/o male with past medical history of T-spine cord injury with paraplegia, diabetes, HTN, hepatitis-C, chronic MRSA L ankle wound/osteomyelitis, was on suppressive doxycycline and R knee infection/septic arthritis and osteomyelitis with GBS/Enterococcus and Ecoli isolates who presented to ER on 10/8 with increased generalized pain, increased foul smelling drainage from sacral wound with fever and chills. He was noted to be hypotensive per EMS. On admit he was afebrile without leukocytosis. ESR 98 and .80. MRSA PCR (-). U/A with 21-50 WBC, 0-5 RBC, 75 LE, no bacteria, negative nitrites. Urine culture negative. Blood cultures negative thus far. Sacral wound culture with GNR. CT pelvis with contrast showed worsening inflammatory change of the sacral decubitus ulcers with gas now identified inferior to the right pubic ramus, stool noted throughout the colon as may be seen with constipation, pyelonephritis is not excluded. Seen by Surgery and underwent debridement of sacral wound.   He is currently on Merrem, Tobramycin and Diflucan    Past Medical and Surgical History  Allergies :   Baclofen    Medical :   He has a past medical history of Arthritis, Chronic ulcer of ankle (05/26/2022), ESBL (extended spectrum beta-lactamase) producing bacteria infection (08/30/2024), Frequent UTI (07/02/2019), Generalized anxiety disorder (05/26/2022), Neurogenic bladder (05/26/2022), Osteomyelitis (05/26/2022), Paraplegia, Presence of suprapubic catheter (05/26/2022), Pure hypercholesterolemia (05/26/2022), Retention of urine, unspecified (08/09/2019), and Spinal cord injury at T1-T6 level (04/20/2018).    Surgical :   He has a past surgical history that includes Insertion of intramedullary raven (Right, 08/10/2022); incision and drainage, lower extremity  (Right, 06/29/2023); Fracture surgery (2021); Spine surgery (March 1st 2018); Joint replacement (2021); incision and drainage, lower extremity (Right, 11/30/2023); incision and drainage, lower extremity (Right, 12/1/2023); Removal of hardware from lower extremity (Right, 12/1/2023); Esophagogastroduodenoscopy (N/A, 8/29/2024); egd, with closed biopsy (N/A, 8/29/2024); Pressure ulcer debridement (N/A, 10/10/2024); and Application of wound vacuum-assisted closure device (N/A, 10/10/2024).     Family History  His family history includes No Known Problems in his father and mother.    Social History  He reports that he has been smoking cigars and cigarettes. He started smoking about 42 years ago. He has a 10.4 pack-year smoking history. He has never used smokeless tobacco. He reports that he does not currently use alcohol after a past usage of about 2.0 standard drinks of alcohol per week. He reports that he does not currently use drugs after having used the following drugs: Oxycodone.     ROS  Constitutional:  Positive for malaise/fatigue.   HENT: Negative.     Eyes: Negative.    Respiratory: Negative.     Cardiovascular: Negative.    Gastrointestinal: Negative.    Musculoskeletal: Negative.    Skin:         Sacral wound   Neurological:  Positive for focal weakness and weakness.   All other systems reviewed and are negative.    Objective   Physical Exam  Vitals reviewed.   HENT:      Head: Normocephalic.   Cardiovascular:      Rate and Rhythm: Normal rate and regular rhythm.   Pulmonary:      Effort: Pulmonary effort is normal. No respiratory distress.      Breath sounds: Normal breath sounds. No wheezing.   Abdominal:      General: Bowel sounds are normal. There is no distension.      Palpations: Abdomen is soft.      Tenderness: There is no abdominal tenderness.   Genitourinary:     Comments: Suprapubic catheter  Musculoskeletal:      Cervical back: Normal range of motion.      Comments: Paraplegia   Skin:      "Findings: No rash.      Comments: Wounds dressed   Neurological:      Mental Status: He is alert and oriented to person, place, and time.   Psychiatric:         Mood and Affect: Mood normal.         Behavior: Behavior normal.     VITAL SIGNS: 24 HR MIN & MAX LAST    Temp  Min: 93 °F (33.9 °C)  Max: 98.9 °F (37.2 °C)  98.9 °F (37.2 °C)        BP  Min: 65/50  Max: 161/111  (!) 130/90     Pulse  Min: 65  Max: 166  99     Resp  Min: 12  Max: 42  (!) 24    SpO2  Min: 86 %  Max: 100 %  98 %      HT: 5' 10" (177.8 cm)  WT: 79.3 kg (174 lb 13.2 oz)  BMI: 25.1     Recent Results (from the past 24 hours)   POCT glucose    Collection Time: 10/16/24 11:28 PM   Result Value Ref Range    POCT Glucose 260 (H) 70 - 110 mg/dL   POCT glucose    Collection Time: 10/17/24 12:15 AM   Result Value Ref Range    POCT Glucose 217 (H) 70 - 110 mg/dL   EKG 12-lead    Collection Time: 10/17/24 12:28 AM   Result Value Ref Range    QRS Duration 70 ms    OHS QTC Calculation 500 ms   RT Blood Gas    Collection Time: 10/17/24  1:30 AM   Result Value Ref Range    Sample Type Arterial Blood     Sample site Right Radial Artery     Drawn by Jose J Guthrie     pH, Blood gas 7.200 (LL) 7.350 - 7.450    pCO2, Blood gas 38.0 35.0 - 45.0 mmHg    pO2, Blood gas 87.0 80.0 - 100.0 mmHg    Sodium, Blood Gas 132 (L) 137 - 145 mmol/L    Potassium, Blood Gas 5.0 3.5 - 5.0 mmol/L    Calcium Level Ionized 1.10 (L) 1.12 - 1.23 mmol/L    TOC2, Blood gas 16.1 mmol/L    Base Excess, Blood gas -12.00 (L) -2.00 - 2.00 mmol/L    sO2, Blood gas 94.1 %    HCO3, Blood gas 14.9 (LL) 22.0 - 26.0 mmol/L    THb, Blood gas <6.5 (L) 12 - 16 g/dL    O2 Hb, Blood Gas 96.4 94.0 - 97.0 %    CO Hgb 1.2 0.5 - 1.5 %    Met Hgb 0.0 (L) 0.4 - 1.5 %    Allens Test Yes     Oxygen Device, Blood gas Cannula     LPM 6    CBC Without Differential    Collection Time: 10/17/24  1:52 AM   Result Value Ref Range    WBC 23.34 (H) 4.50 - 11.50 x10(3)/mcL    RBC 1.89 (L) 4.70 - 6.10 x10(6)/mcL    Hgb 5.2 " (LL) 14.0 - 18.0 g/dL    Hct 16.8 (LL) 42.0 - 52.0 %    MCV 88.9 80.0 - 94.0 fL    MCH 27.5 27.0 - 31.0 pg    MCHC 31.0 (L) 33.0 - 36.0 g/dL    RDW 17.8 (H) 11.5 - 17.0 %    Platelet 372 130 - 400 x10(3)/mcL    MPV 9.2 7.4 - 10.4 fL    NRBC% 0.1 %   Comprehensive Metabolic Panel    Collection Time: 10/17/24  1:52 AM   Result Value Ref Range    Sodium 135 (L) 136 - 145 mmol/L    Potassium 5.4 (H) 3.5 - 5.1 mmol/L    Chloride 108 (H) 98 - 107 mmol/L    CO2 11 (L) 23 - 31 mmol/L    Glucose 323 (H) 82 - 115 mg/dL    Blood Urea Nitrogen 14.7 8.4 - 25.7 mg/dL    Creatinine 1.66 (H) 0.73 - 1.18 mg/dL    Calcium 7.0 (L) 8.8 - 10.0 mg/dL    Protein Total 4.5 (L) 5.8 - 7.6 gm/dL    Albumin 1.7 (L) 3.4 - 4.8 g/dL    Globulin 2.8 2.4 - 3.5 gm/dL    Albumin/Globulin Ratio 0.6 (L) 1.1 - 2.0 ratio    Bilirubin Total 0.2 <=1.5 mg/dL    ALP 52 40 - 150 unit/L    ALT 7 0 - 55 unit/L    AST 14 5 - 34 unit/L    eGFR 47 mL/min/1.73/m2    Anion Gap 16.0 mEq/L    BUN/Creatinine Ratio 9    Magnesium    Collection Time: 10/17/24  1:52 AM   Result Value Ref Range    Magnesium Level 1.70 1.60 - 2.60 mg/dL   Phosphorus    Collection Time: 10/17/24  1:52 AM   Result Value Ref Range    Phosphorus Level 7.3 (H) 2.3 - 4.7 mg/dL   Prepare RBC 3 Units; emergency    Collection Time: 10/17/24  2:27 AM   Result Value Ref Range    UNIT NUMBER [id number]     UNIT ABO/RH O POS     DISPENSE STATUS Selected     Unit Expiration [id number]     Product Code I6105G00     Unit Blood Type Code 5100     CROSSMATCH INTERPRETATION Compatible     UNIT NUMBER [id number]     UNIT ABO/RH O POS     DISPENSE STATUS Issued     Unit Expiration [id number]     Product Code K0572F25     Unit Blood Type Code 5100     CROSSMATCH INTERPRETATION Compatible     UNIT NUMBER [id number]     UNIT ABO/RH O POS     DISPENSE STATUS Issued     Unit Expiration [id number]     Product Code C3659M91     Unit Blood Type Code 5100     CROSSMATCH INTERPRETATION Compatible     UNIT  NUMBER [id number]     UNIT ABO/RH O NEG     DISPENSE STATUS Issued     Unit Expiration [id number]     Product Code U2589U12     Unit Blood Type Code 9500     CROSSMATCH INTERPRETATION Compatible     UNIT NUMBER [id number]     UNIT ABO/RH O NEG     DISPENSE STATUS Issued     Unit Expiration [id number]     Product Code X5786I85     Unit Blood Type Code 9500     CROSSMATCH INTERPRETATION Compatible     UNIT NUMBER [id number]     UNIT ABO/RH O NEG     DISPENSE STATUS Issued     Unit Expiration [id number]     Product Code Y5395E14     Unit Blood Type Code 9500     CROSSMATCH INTERPRETATION Compatible    Type & Screen    Collection Time: 10/17/24  2:27 AM   Result Value Ref Range    Group & Rh O POS     Indirect Elizabeth GEL NEG     Specimen Outdate 10/20/2024 23:59    Prepare Fresh Frozen Plasma 1 Unit    Collection Time: 10/17/24  2:27 AM   Result Value Ref Range    UNIT NUMBER [id number]     UNIT ABO/RH O POS     DISPENSE STATUS Issued     Unit Expiration [id number]     Product Code F6982E40     Unit Blood Type Code 5100     CROSSMATCH INTERPRETATION Not required    Prepare Fresh Frozen Plasma 1 Unit    Collection Time: 10/17/24  2:27 AM   Result Value Ref Range    UNIT NUMBER [id number]     UNIT ABO/RH O POS     DISPENSE STATUS Issued     Unit Expiration [id number]     Product Code B0833NG8     Unit Blood Type Code 5100     CROSSMATCH INTERPRETATION Not required    Prepare Platelets 1 Dose    Collection Time: 10/17/24  2:27 AM   Result Value Ref Range    UNIT NUMBER [id number]     UNIT ABO/RH O NEG     DISPENSE STATUS Issued     Unit Expiration [id number]     Product Code W0364W78     Unit Blood Type Code 9500     CROSSMATCH INTERPRETATION Not required    Prepare RBC 3 Units; emergency    Collection Time: 10/17/24  2:27 AM   Result Value Ref Range    UNIT NUMBER [id number]     UNIT ABO/RH O POS     DISPENSE STATUS Selected     Unit Expiration [id number]     Product Code O1330U38      Unit Blood Type Code 5100     CROSSMATCH INTERPRETATION Compatible     UNIT NUMBER [id number]     UNIT ABO/RH O POS     DISPENSE STATUS Selected     Unit Expiration [id number]     Product Code E8072C13     Unit Blood Type Code 5100     CROSSMATCH INTERPRETATION Compatible     UNIT NUMBER [id number]     UNIT ABO/RH O POS     DISPENSE STATUS Selected     Unit Expiration [id number]     Product Code V9104U92     Unit Blood Type Code 5100     CROSSMATCH INTERPRETATION Compatible    CBC with Differential    Collection Time: 10/17/24  5:23 AM   Result Value Ref Range    WBC 24.85 (H) 4.50 - 11.50 x10(3)/mcL    RBC 2.43 (L) 4.70 - 6.10 x10(6)/mcL    Hgb 7.0 (L) 14.0 - 18.0 g/dL    Hct 21.6 (L) 42.0 - 52.0 %    MCV 88.9 80.0 - 94.0 fL    MCH 28.8 27.0 - 31.0 pg    MCHC 32.4 (L) 33.0 - 36.0 g/dL    RDW 16.7 11.5 - 17.0 %    Platelet 216 130 - 400 x10(3)/mcL    MPV 9.5 7.4 - 10.4 fL    Neut % 72.5 %    Lymph % 16.6 %    Mono % 7.0 %    Eos % 0.0 %    Basophil % 0.2 %    Lymph # 4.13 0.6 - 4.6 x10(3)/mcL    Neut # 18.00 (H) 2.1 - 9.2 x10(3)/mcL    Mono # 1.73 (H) 0.1 - 1.3 x10(3)/mcL    Eos # 0.01 0 - 0.9 x10(3)/mcL    Baso # 0.06 <=0.2 x10(3)/mcL    IG# 0.92 (H) 0 - 0.04 x10(3)/mcL    IG% 3.7 %    NRBC% 0.1 %   RT Blood Gas    Collection Time: 10/17/24  6:04 AM   Result Value Ref Range    Sample Type Arterial Blood     Sample site Arterial Line     Drawn by sd rrt     pH, Blood gas 7.350 7.350 - 7.450    pCO2, Blood gas 35.0 35.0 - 45.0 mmHg    pO2, Blood gas 416.0 (H) 80.0 - 100.0 mmHg    Sodium, Blood Gas 132 (L) 137 - 145 mmol/L    Potassium, Blood Gas 5.3 (H) 3.5 - 5.0 mmol/L    Calcium Level Ionized 0.90 (L) 1.12 - 1.23 mmol/L    TOC2, Blood gas 20.4 mmol/L    Base Excess, Blood gas -5.80 (L) -2.00 - 2.00 mmol/L    sO2, Blood gas 100.0 %    HCO3, Blood gas 19.3 (L) 22.0 - 26.0 mmol/L    THb, Blood gas 6.8 (L) 12 - 16 g/dL    O2 Hb, Blood Gas 98.7 (H) 94.0 - 97.0 %    CO Hgb 1.2 0.5 - 1.5 %    Met Hgb 0.2 (L) 0.4  - 1.5 %    Allens Test N/A     MODE AC     Oxygen Device, Blood gas Ventilator     FIO2, Blood gas 100 %    Mech Vt 450 ml    Mech RR 24 b/min    PEEP 5.0 cmH2O   EKG 12-lead    Collection Time: 10/17/24  6:06 AM   Result Value Ref Range    QRS Duration 70 ms    OHS QTC Calculation 497 ms   Hemoglobin and Hematocrit    Collection Time: 10/17/24  6:15 AM   Result Value Ref Range    Hgb 6.9 (L) 14.0 - 18.0 g/dL    Hct 21.3 (L) 42.0 - 52.0 %   APTT    Collection Time: 10/17/24  6:15 AM   Result Value Ref Range    PTT 47.2 (H) 23.2 - 33.7 seconds   Comprehensive Metabolic Panel    Collection Time: 10/17/24  6:15 AM   Result Value Ref Range    Sodium 139 136 - 145 mmol/L    Potassium 5.6 (H) 3.5 - 5.1 mmol/L    Chloride 106 98 - 107 mmol/L    CO2 16 (L) 23 - 31 mmol/L    Glucose 253 (H) 82 - 115 mg/dL    Blood Urea Nitrogen 16.4 8.4 - 25.7 mg/dL    Creatinine 1.94 (H) 0.73 - 1.18 mg/dL    Calcium 9.9 8.8 - 10.0 mg/dL    Protein Total 3.6 (L) 5.8 - 7.6 gm/dL    Albumin 1.6 (L) 3.4 - 4.8 g/dL    Globulin 2.0 (L) 2.4 - 3.5 gm/dL    Albumin/Globulin Ratio 0.8 (L) 1.1 - 2.0 ratio    Bilirubin Total 0.2 <=1.5 mg/dL    ALP 41 40 - 150 unit/L    ALT 39 0 - 55 unit/L    AST 77 (H) 5 - 34 unit/L    eGFR 39 mL/min/1.73/m2    Anion Gap 17.0 mEq/L    BUN/Creatinine Ratio 8    Magnesium    Collection Time: 10/17/24  6:15 AM   Result Value Ref Range    Magnesium Level 1.70 1.60 - 2.60 mg/dL   Phosphorus    Collection Time: 10/17/24  6:15 AM   Result Value Ref Range    Phosphorus Level 9.5 (HH) 2.3 - 4.7 mg/dL   Protime-INR    Collection Time: 10/17/24  6:15 AM   Result Value Ref Range    PT 20.4 (H) 12.5 - 14.5 seconds    INR 1.7 (H) <=1.3   Lactic Acid, Plasma    Collection Time: 10/17/24  9:12 AM   Result Value Ref Range    Lactic Acid Level 7.3 (HH) 0.5 - 2.2 mmol/L   Comprehensive Metabolic Panel    Collection Time: 10/17/24  9:12 AM   Result Value Ref Range    Sodium 140 136 - 145 mmol/L    Potassium 5.1 3.5 - 5.1 mmol/L     Chloride 103 98 - 107 mmol/L    CO2 21 (L) 23 - 31 mmol/L    Glucose 181 (H) 82 - 115 mg/dL    Blood Urea Nitrogen 18.4 8.4 - 25.7 mg/dL    Creatinine 1.96 (H) 0.73 - 1.18 mg/dL    Calcium 7.5 (L) 8.8 - 10.0 mg/dL    Protein Total 4.3 (L) 5.8 - 7.6 gm/dL    Albumin 2.1 (L) 3.4 - 4.8 g/dL    Globulin 2.2 (L) 2.4 - 3.5 gm/dL    Albumin/Globulin Ratio 1.0 (L) 1.1 - 2.0 ratio    Bilirubin Total 0.4 <=1.5 mg/dL    ALP 51 40 - 150 unit/L    ALT 97 (H) 0 - 55 unit/L     (H) 5 - 34 unit/L    eGFR 38 mL/min/1.73/m2    Anion Gap 16.0 mEq/L    BUN/Creatinine Ratio 9    Protime-INR    Collection Time: 10/17/24  9:12 AM   Result Value Ref Range    PT 19.7 (H) 12.5 - 14.5 seconds    INR 1.7 (H) <=1.3   APTT    Collection Time: 10/17/24  9:12 AM   Result Value Ref Range    PTT 43.2 (H) 23.2 - 33.7 seconds   CBC with Differential    Collection Time: 10/17/24  9:12 AM   Result Value Ref Range    WBC 22.20 (H) 4.50 - 11.50 x10(3)/mcL    RBC 3.15 (L) 4.70 - 6.10 x10(6)/mcL    Hgb 9.4 (L) 14.0 - 18.0 g/dL    Hct 27.5 (L) 42.0 - 52.0 %    MCV 87.3 80.0 - 94.0 fL    MCH 29.8 27.0 - 31.0 pg    MCHC 34.2 33.0 - 36.0 g/dL    RDW 14.8 11.5 - 17.0 %    Platelet 222 130 - 400 x10(3)/mcL    MPV 9.5 7.4 - 10.4 fL    Neut % 78.4 %    Lymph % 15.8 %    Mono % 2.7 %    Eos % 0.0 %    Basophil % 0.3 %    Lymph # 3.51 0.6 - 4.6 x10(3)/mcL    Neut # 17.39 (H) 2.1 - 9.2 x10(3)/mcL    Mono # 0.61 0.1 - 1.3 x10(3)/mcL    Eos # 0.01 0 - 0.9 x10(3)/mcL    Baso # 0.06 <=0.2 x10(3)/mcL    IG# 0.62 (H) 0 - 0.04 x10(3)/mcL    IG% 2.8 %    NRBC% 0.2 %   Magnesium    Collection Time: 10/17/24  9:12 AM   Result Value Ref Range    Magnesium Level 1.50 (L) 1.60 - 2.60 mg/dL   Phosphorus    Collection Time: 10/17/24  9:12 AM   Result Value Ref Range    Phosphorus Level 8.4 (H) 2.3 - 4.7 mg/dL   Blood Gas    Collection Time: 10/17/24  9:30 AM   Result Value Ref Range    Sample Type Arterial Blood     Sample site Arterial Line     Drawn by AS RRT     pH, Blood  gas 7.410 7.350 - 7.450    pCO2, Blood gas 36.0 35.0 - 45.0 mmHg    pO2, Blood gas 450.0 (H) 80.0 - 100.0 mmHg    Sodium, Blood Gas 130 (L) 137 - 145 mmol/L    Potassium, Blood Gas 4.6 3.5 - 5.0 mmol/L    Calcium Level Ionized 1.01 (L) 1.12 - 1.23 mmol/L    TOC2, Blood gas 23.9 mmol/L    Base Excess, Blood gas -1.50 -2.00 - 2.00 mmol/L    sO2, Blood gas 100.0 %    HCO3, Blood gas 22.8 22.0 - 26.0 mmol/L    THb, Blood gas 10.3 (L) 12 - 16 g/dL    O2 Hb, Blood Gas 97.4 (H) 94.0 - 97.0 %    CO Hgb 1.1 0.5 - 1.5 %    Met Hgb 0.9 0.4 - 1.5 %    Allens Test N/A     MODE AC     Oxygen Device, Blood gas Ventilator     FIO2, Blood gas 100 %    Mech Vt 450 ml    Mech RR 24 b/min    PEEP 5.0 cmH2O   Hemoglobin and Hematocrit    Collection Time: 10/17/24 10:07 AM   Result Value Ref Range    Hgb 9.7 (L) 14.0 - 18.0 g/dL    Hct 27.7 (L) 42.0 - 52.0 %   Hemoglobin and Hematocrit    Collection Time: 10/17/24 12:19 PM   Result Value Ref Range    Hgb 9.3 (L) 14.0 - 18.0 g/dL    Hct 26.1 (L) 42.0 - 52.0 %   Hemoglobin and Hematocrit    Collection Time: 10/17/24  1:53 PM   Result Value Ref Range    Hgb 8.3 (L) 14.0 - 18.0 g/dL    Hct 23.0 (L) 42.0 - 52.0 %   Hemoglobin and Hematocrit    Collection Time: 10/17/24  4:00 PM   Result Value Ref Range    Hgb 7.9 (L) 14.0 - 18.0 g/dL    Hct 21.6 (L) 42.0 - 52.0 %   Urinalysis    Collection Time: 10/17/24  6:37 PM   Result Value Ref Range    Color, UA Yellow Yellow, Light-Yellow, Colorless, Straw, Dark-Yellow    Appearance, UA Turbid (A) Clear    Specific Gravity, UA 1.046 (H) 1.005 - 1.030    pH, UA 5.5 5.0 - 8.5    Protein, UA 1+ (A) Negative    Glucose, UA Normal Negative, Normal    Ketones, UA Trace (A) Negative    Blood, UA 3+ (A) Negative    Bilirubin, UA Negative Negative    Urobilinogen, UA Normal 0.2, 1.0, Normal    Nitrites, UA Negative Negative    Leukocyte Esterase, UA 75 (A) Negative    RBC, UA 50-99 (A) None Seen, 0-2, 3-5, 0-5 /HPF    WBC, UA 11-20 (A) None Seen, 0-2, 3-5, 0-5  /HPF    Bacteria, UA None Seen None Seen, Trace /HPF    Squamous Epithelial Cells, UA Trace None Seen, Trace, Rare /HPF    Mucous, UA Trace (A) None Seen /LPF    Amorphous Crystal, UA Occasional (A) None Seen /uL   Hemoglobin and Hematocrit    Collection Time: 10/17/24  6:41 PM   Result Value Ref Range    Hgb 7.6 (L) 14.0 - 18.0 g/dL    Hct 20.7 (L) 42.0 - 52.0 %       Imaging  10/8/24 CT Pelvis with  Impression:   As above.  Worsening inflammatory change of the sacral decubitus ulcers with gas now identified inferior to the right pubic ramus.  Stool noted throughout the colon as may be seen with constipation.  Pyelonephritis is not excluded on the basis of this exam.    Impression  Sepsis  Sacral wound infection  ANTON  Pyelonephritis per CT   Constipation  Paraplegia  Neurogenic bladder with suprapubic catheter  DM Type II  MRSA L ankle osteomyelitis with retained hardware  Anemia    Recommendations  I agree with the management of Mr Khan. This is a 60 y/o male who presented to ER with worsening sacral wound with increased pain and foul smelling drainage, fever, chills and hypotension. On admit he was afebrile without leukocytosis. MRSA PCR (-). U/A not impressive with urine culture pending. Blood cultures pending. CT pelvis showed worsening inflammatory change of the sacral decubitus with gas identified inferior to the R pubic ramus and findings of possible pyelonephritis. We will continue Merrem, Tobramycin and Diflucan for now. Get wound cultures. Continue wound care. Case discussed with Dr Hutchison as well as with the patient, wife and nursing. Thank you for this consultation, we will continue to follow Mr Khan with you.

## 2024-10-18 NOTE — CONSULTS
Inpatient Nutrition Assessment    Admit Date: 10/8/2024   Total duration of encounter: 10 days   Patient Age: 59 y.o.    Nutrition Recommendation/Prescription     When appropriate, start tube feeding at 35 ml/hr, advance to goal after 4 hours:  Peptamen Intense VHP goal rate 70 ml/hr   Malachi BID  to provide (calculations based on estimated 20 hr/d run time)  1580 kcal/d  135 g protein/d  105 g carbohydrate/d  2100 mg potassium/d  952 mg phosphorus/d  1176 ml free water/d    Communication of Recommendations: reviewed with family    Nutrition Assessment     Malnutrition Assessment/Nutrition-Focused Physical Exam    Malnutrition Context: acute illness or injury (10/17/24 1218)  Malnutrition Level: moderate (10/17/24 1218)  Energy Intake (Malnutrition):  (family denies) (10/17/24 1218)  Weight Loss (Malnutrition):  (does not meet criteria) (10/17/24 1218)  Subcutaneous Fat (Malnutrition): mild depletion (10/17/24 1218)     Upper Arm Region (Subcutaneous Fat Loss): mild depletion     Muscle Mass (Malnutrition): mild depletion (10/17/24 1218)     Clavicle Bone Region (Muscle Loss): mild depletion                    Fluid Accumulation (Malnutrition): mild (10/17/24 1218)        A minimum of two characteristics is recommended for diagnosis of either severe or non-severe malnutrition.    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 including ESBL E coli,  Enterobacter, carbapenem resistant Pseudomonas, Enterococcus faecalis     Scheduled Medications:  ampicillin-sulbactam, 3 g, Q8H  atorvastatin, 20 mg, Nightly  calcium gluconate 1 g, 1 g, Once  calcium gluconate IVPB, 1 g, Q1H  ciprofloxacin, 400 mg, Q12H  fenofibrate, 48 mg, Daily  FLUoxetine, 20 mg, Daily  gabapentin, 400 mg, Q8H  hydrocortisone sodium succinate, 100 mg, Q8H  [START ON 10/21/2024] mupirocin, , BID  oxybutynin, 5 mg, TID  pantoprazole, 40 mg, Daily  senna-docusate 8.6-50 mg, 1 tablet, BID  sodium chloride 0.9%, 10 mL, Q6H    Continuous Infusions:  fentanyl, Last Rate: 100 mcg/hr (10/18/24 0628)  vasopressin, Last Rate: 0.1 Units/min (10/18/24 0255)    PRN Medications:   0.9%  NaCl infusion (for blood administration), , Q24H PRN  0.9%  NaCl infusion (for blood administration), , Q24H PRN  0.9%  NaCl infusion (for blood administration), , Q24H PRN  0.9%  NaCl infusion (for blood administration), , Q24H PRN  0.9%  NaCl infusion (for blood administration), , Q24H PRN  0.9%  NaCl infusion (for blood administration), , Q24H PRN  0.9%  NaCl infusion (for blood administration), , Q24H PRN  0.9%  NaCl infusion (for blood administration), , Q24H PRN  acetaminophen, 650 mg, Q4H PRN  aluminum-magnesium hydroxide-simethicone, 30 mL, QID PRN  dextrose 10%, 12.5 g, PRN  dextrose 10%, 25 g, PRN  diphenhydrAMINE, 50 mg, Q6H PRN  etomidate, , Code/trauma/sedation Med  glucagon (human recombinant), 1 mg, PRN  glucose, 16 g, PRN  glucose, 24 g, PRN  hyoscyamine, 0.125 mg, Q4H PRN  insulin aspart U-100, 1-10 Units, QID (AC + HS) PRN  melatonin, 6 mg, Nightly PRN  methocarbamoL, 750 mg, TID PRN  morphine, 2 mg, BID PRN  ondansetron, 4 mg, Q4H PRN  oxyCODONE-acetaminophen, 1 tablet, Q4H PRN  polyethylene glycol, 17 g, BID PRN  prochlorperazine, 5 mg, Q6H PRN  rocuronium, , Code/trauma/sedation Med  sodium chloride 0.9%, 10 mL, PRN  sodium chloride 0.9%, 10 mL, PRN  traZODone, 200 mg, Nightly PRN  vancomycin - pharmacy to  dose, , pharmacy to manage frequency    Calorie Containing IV Medications: no significant kcals from medications at this time    Recent Labs   Lab 10/12/24  1048 10/13/24  0913 10/16/24  0353 10/17/24  0152 10/17/24  0523 10/17/24  0615 10/17/24  0912 10/17/24  1007 10/17/24  1600 10/17/24  1841 10/17/24  2010 10/17/24  2203 10/17/24  2356 10/18/24  0241 10/18/24  0555    140 140 135*  --  139 140  --   --   --   --   --   --  137  --    K 5.2* 4.8 4.6 5.4*  --  5.6* 5.1  --   --   --   --   --   --  3.6  --    CALCIUM 9.0 8.9 8.4* 7.0*  --  9.9 7.5*  --   --   --   --   --   --  7.3*  --    PHOS  --   --   --  7.3*  --  9.5* 8.4*  --   --   --   --   --   --  5.4*  --    MG  --   --  1.70 1.70  --  1.70 1.50*  --   --   --   --   --   --  1.90  --     103 106 108*  --  106 103  --   --   --   --   --   --  96*  --    CO2 24 27 26 11*  --  16* 21*  --   --   --   --   --   --  28  --    BUN 16.3 18.9 12.4 14.7  --  16.4 18.4  --   --   --   --   --   --  23.7  --    CREATININE 1.06 0.96 0.90 1.66*  --  1.94* 1.96*  --   --   --   --   --   --  2.57*  --    EGFRNORACEVR >60 >60 >60 47  --  39 38  --   --   --   --   --   --  28  --    GLUCOSE 114 117* 114 323*  --  253* 181*  --   --   --   --   --   --  208*  --    BILITOT  --   --   --  0.2  --  0.2 0.4  --   --   --   --   --   --  0.4  --    ALKPHOS  --   --   --  52  --  41 51  --   --   --   --   --   --  80  --    ALT  --   --   --  7  --  39 97*  --   --   --   --   --   --  124*  --    AST  --   --   --  14  --  77* 178*  --   --   --   --   --   --  266*  --    ALBUMIN  --   --   --  1.7*  --  1.6* 2.1*  --   --   --   --   --   --  1.8*  --    WBC 9.97  --  9.87 23.34* 24.85*  --  22.20*  --   --   --   --   --   --  21.77*  --    HGB 11.5*  --  10.4* 5.2* 7.0* 6.9* 9.4*   < > 7.9* 7.6* 7.3* 7.3* 7.1* 7.1* 6.9*   HCT 35.3*  --  32.9* 16.8* 21.6* 21.3* 27.5*   < > 21.6* 20.7* 20.2* 19.8* 19.5* 19.7* 18.9*    < > = values in this interval not  "displayed.     Nutrition Orders:  Diet NPO Except for: Medication  Dietary nutrition supplements BID; Malachi - Any flavor,Tube Feedings/Formulas Peptamen AF; NG    Appetite/Oral Intake: NPO/not applicable  Factors Affecting Nutritional Intake: NPO and on mechanical ventilation  Social Needs Impacting Access to Food: none identified  Food/Orthodox/Cultural Preferences: none reported  Food Allergies: none reported  Last Bowel Movement: 10/17/24  Wound(s):     Altered Skin Integrity 23 2230 Right lateral Ankle #6-Tissue loss description: Full thickness       Wound 24 0000 Pressure Injury Right Buttocks-Tissue loss description: Full thickness       Altered Skin Integrity 24 0848 upper Sacral spine-Tissue loss description: Full thickness       Wound 24 0800 Other (comment) Left Heel-Tissue loss description: Full thickness    Comments    10/10: Pt was in surgery at time of visit. Having wound debridement with wound vac placement on Stage 4 wound of rt gluteus. Will add Malachi to assist with wound healing. Recommend vitamin regimen for wounds.     10/17/24 Patient transferred to ICU, on ventilator currently, no propofol. Spoke with patient's wife at bedside who reports patient was eating well with a good appetite prior to ICU transfer, she reports bringing him food from home, good intake of Malachi. Tube feeding recommendation provided.    10/18/24 Consult received for tube feeding, will order.    Anthropometrics    Height: 5' 10" (177.8 cm), Height Method: Stated  Last Weight: 79.3 kg (174 lb 13.2 oz) (10/17/24 1212), Weight Method: Bed Scale  BMI (Calculated): 25.1  BMI Classification: normal (BMI 18.5-24.9)        Ideal Body Weight (IBW), Male: 166 lb     % Ideal Body Weight, Male (lb): 96.95 %                 Usual Body Weight (UBW), k.57 kg-77.11 kg  % Usual Body Weight: 109.5  % Weight Change From Usual Weight: 9.27 %  Usual Weight Provided By: family/caregiver    Wt Readings from Last 5 " Encounters:   10/17/24 79.3 kg (174 lb 13.2 oz)   24 63.5 kg (139 lb 15.9 oz)   24 71.2 kg (156 lb 15.5 oz)   24 78.5 kg (173 lb 1 oz)   01/10/24 78.5 kg (173 lb 1 oz)     Weight Change(s) Since Admission:   (10/17) took bed weight 79.3 kg during rounds (estimated 4 kg subtracted for equipment), increase noted  Wt Readings from Last 1 Encounters:   10/17/24 1212 79.3 kg (174 lb 13.2 oz)   10/09/24 0430 73 kg (160 lb 15 oz)   10/08/24 171 63.5 kg (140 lb)   Admit Weight: 63.5 kg (140 lb) (10/08/24 1719), Weight Method: Stated    Estimated Needs    Weight Used For Calorie Calculations: 77 kg (169 lb 12.1 oz)  Energy Calorie Requirements (kcal): 1,697.72  Energy Need Method: [place]  Total Ve: 8.1 L/m, Temp (24hrs), Av.4 °F (37.4 °C), Min:98.8 °F (37.1 °C), Max:100.4 °F (38 °C)  Vtot (L/Min) for [place] Equation Calculation: 9.7; Max Temp for [place] Equation Calculation: 97.6 °F  Weight Used For Protein Calculations: 77 kg (169 lb 12.1 oz)  Protein Requirements: 116-139 g, 1.5-1.8 g/kg  Fluid Requirements (mL): 1,697.72, 1 ml/kcal  CHO Requirement: 170-212 g, 40-50% of kcal  Last Updated: 10/17     Enteral Nutrition Patient not receiving enteral nutrition at this time.    Parenteral Nutrition Patient not receiving parenteral nutrition support at this time.    Evaluation of Received Nutrient Intake    Calories: not meeting estimated needs  Protein: not meeting estimated needs    Patient Education Not applicable.    Nutrition Diagnosis     PES: Inadequate energy intake related to inability to consume sufficient nutrients as evidenced by less than 80% needs met. (active)  PES: Moderate acute disease or injury related malnutrition related to acute illness as evidenced by mild fat depletion, mild muscle depletion, and edema. (active)    Nutrition Interventions     Intervention(s): modified composition of enteral nutrition, modified rate of enteral nutrition, and collaboration with other  providers  Goal: Meet greater than 80% of nutritional needs by follow-up. (goal progressing)  Goal: Tolerate enteral feeding at goal rate by follow-up. (goal progressing)    Nutrition Goals & Monitoring     Dietitian will monitor: food and beverage intake, energy intake, enteral nutrition intake, parenteral nutrition intake, weight, weight change, electrolyte/renal panel, beliefs/attitudes, glucose/endocrine profile, and gastrointestinal profile  Discharge planning: too early to determine; pending clinical course  Nutrition Risk/Follow-Up: high (follow-up in 1-4 days)   Please consult if re-assessment needed sooner.

## 2024-10-18 NOTE — PROGRESS NOTES
Pharmacokinetic Assessment Follow Up: IV Vancomycin    Vancomycin serum concentration assessment(s):    The random level was drawn correctly and can be used to guide therapy at this time. The measurement is above the desired definitive target range of 15 to 20 mcg/mL.    Vancomycin Regimen Plan:  Will hold doses today, re-check level with am labs tomorrow  Re-dose when the random level is less than 20 mcg/mL, next level to be drawn at 0430 on 10/19    Scheduled Administration Times        Drug levels (last 3 results):  Recent Labs   Lab Result Units 10/18/24  0241   Vancomycin Random ug/ml 21.3*       Vancomycin Administrations:  vancomycin given in the last 96 hours                     vancomycin 1,500 mg in D5W 250 mL IVPB (admixture device) (mg) 1,500 mg New Bag 10/17/24 1316                    Pharmacy will continue to follow and monitor vancomycin.    Please contact pharmacy at extension [phone] for questions regarding this assessment.    Thank you for the consult,   Ravindra Townsend       Patient brief summary:  Bianca Khan is a 59 y.o. male initiated on antimicrobial therapy with IV Vancomycin for treatment of sepsis    The patient's current regimen is pulse dosing    Drug Allergies:   Review of patient's allergies indicates:   Allergen Reactions    Baclofen Itching and Anxiety       Actual Body Weight:  Wt Readings from Last 1 Encounters:   10/17/24 79.3 kg (174 lb 13.2 oz)       Renal Function:   Estimated Creatinine Clearance: 31.6 mL/min (A) (based on SCr of 2.57 mg/dL (H)).,     Dialysis Method (if applicable):  N/A    CBC (last 72 hours):  Recent Labs   Lab Result Units 10/16/24  0353 10/17/24  0152 10/17/24  0523 10/17/24  0615 10/17/24  0912 10/17/24  1007 10/17/24  1219 10/17/24  1353 10/17/24  1600 10/17/24  1841 10/17/24  2010 10/17/24  2203 10/17/24  2356 10/18/24  0241 10/18/24  0555   WBC x10(3)/mcL 9.87 23.34* 24.85*  --  22.20*  --   --   --   --   --   --   --   --  21.77*  --    Hgb g/dL 10.4*  5.2* 7.0* 6.9* 9.4* 9.7* 9.3* 8.3* 7.9* 7.6* 7.3* 7.3* 7.1* 7.1* 6.9*   Hct % 32.9* 16.8* 21.6* 21.3* 27.5* 27.7* 26.1* 23.0* 21.6* 20.7* 20.2* 19.8* 19.5* 19.7* 18.9*   Platelet x10(3)/mcL 474* 372 216  --  222  --   --   --   --   --   --   --   --  201  --    Mono % % 8.2  --  7.0  --  2.7  --   --   --   --   --   --   --   --  4.6  --    Eos % % 1.0  --  0.0  --  0.0  --   --   --   --   --   --   --   --  0.0  --    Basophil % % 0.2  --  0.2  --  0.3  --   --   --   --   --   --   --   --  0.3  --        Metabolic Panel (last 72 hours):  Recent Labs   Lab Result Units 10/16/24  0353 10/17/24  0130 10/17/24  0152 10/17/24  0604 10/17/24  0615 10/17/24  0912 10/17/24  0930 10/17/24  1837 10/18/24  0241   Sodium mmol/L 140  --  135*  --  139 140  --   --  137   Sodium, Blood Gas mmol/L  --  132*  --  132*  --   --  130*  --   --    Potassium mmol/L 4.6  --  5.4*  --  5.6* 5.1  --   --  3.6   Potassium, Blood Gas mmol/L  --  5.0  --  5.3*  --   --  4.6  --   --    Chloride mmol/L 106  --  108*  --  106 103  --   --  96*   CO2 mmol/L 26  --  11*  --  16* 21*  --   --  28   Glucose mg/dL 114  --  323*  --  253* 181*  --   --  208*   Glucose, UA   --   --   --   --   --   --   --  Normal  --    Blood Urea Nitrogen mg/dL 12.4  --  14.7  --  16.4 18.4  --   --  23.7   Creatinine mg/dL 0.90  --  1.66*  --  1.94* 1.96*  --   --  2.57*   Albumin g/dL  --   --  1.7*  --  1.6* 2.1*  --   --  1.8*   Bilirubin Total mg/dL  --   --  0.2  --  0.2 0.4  --   --  0.4   ALP unit/L  --   --  52  --  41 51  --   --  80   AST unit/L  --   --  14  --  77* 178*  --   --  266*   ALT unit/L  --   --  7  --  39 97*  --   --  124*   Magnesium Level mg/dL 1.70  --  1.70  --  1.70 1.50*  --   --  1.90   Phosphorus Level mg/dL  --   --  7.3*  --  9.5* 8.4*  --   --  5.4*       Microbiologic Results:  Microbiology Results (last 7 days)       Procedure Component Value Units Date/Time    Blood Culture [[id number]] Collected: 10/17/24 0942     Order Status: Resulted Specimen: Blood Updated: 10/17/24 0954    Blood Culture [[id number]] Collected: 10/17/24 0942    Order Status: Resulted Specimen: Blood Updated: 10/17/24 0950    Blood Culture [[id number]]     Order Status: Canceled Specimen: Blood     Blood Culture [[id number]]     Order Status: Canceled Specimen: Blood     Blood culture #2 **CANNOT BE ORDERED STAT** [[id number]]  (Normal) Collected: 10/09/24 0521    Order Status: Completed Specimen: Blood from Arm, Right Updated: 10/14/24 1101     Blood Culture No Growth at 5 days    Blood culture #1 **CANNOT BE ORDERED STAT** [[id number]]  (Normal) Collected: 10/08/24 1817    Order Status: Completed Specimen: Blood Updated: 10/13/24 1900     Blood Culture No Growth at 5 days    Wound Culture [[id number]]  (Abnormal)  (Susceptibility) Collected: 10/09/24 1011    Order Status: Completed Specimen: Wound from Sacral Updated: 10/12/24 1219     Wound Culture Many ACINETOBACTER BAUMANNII     Comment: CRAB (Carbapenem-resistant Acinetobacter baumannii)         Many Escherichia coli ESBL      Moderate Enterococcus faecalis    Anaerobic Culture [[id number]]  (Abnormal) Collected: 10/09/24 1011    Order Status: Completed Specimen: Wound from Sacral Updated: 10/12/24 1123     Anaerobe Culture Bacteroides thetaiotaomicron      Peptoniphilus asaccharolyticus     Comment: Anaerobic sensitivity is not usually indicated except on single isolates from sterile body sites.

## 2024-10-18 NOTE — PROGRESS NOTES
Pharmacist Renal Dose Adjustment Note    Bianca Khan is a 59 y.o. male being treated with the medication unasyn    Patient Data:    Vital Signs (Most Recent):  Temp: 98.8 °F (37.1 °C) (10/18/24 0812)  Pulse: 105 (10/18/24 0812)  Resp: 20 (10/18/24 0812)  BP: 123/87 (10/18/24 0615)  SpO2: 100 % (10/18/24 0812) Vital Signs (72h Range):  Temp:  [93 °F (33.9 °C)-100.4 °F (38 °C)]   Pulse:  []   Resp:  [12-42]   BP: ()/()   SpO2:  [86 %-100 %]   Arterial Line BP: ()/(-)      Recent Labs   Lab 10/17/24  0615 10/17/24  0912 10/18/24  0241   CREATININE 1.94* 1.96* 2.57*     Serum creatinine: 2.57 mg/dL (H) 10/18/24 0241  Estimated creatinine clearance: 31.6 mL/min (A)    Medication:unasyn dose: 3g frequency q4h will be changed to medication:unasyn dose:3g frequency:q8h for CrCl 30-50    Pharmacist's Name: Ravindra Townsend  Pharmacist's Extension: [phone]
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
## 2024-10-18 NOTE — PLAN OF CARE
Problem: Wound  Goal: Optimal Coping  Outcome: Progressing  Goal: Optimal Functional Ability  Outcome: Progressing  Goal: Absence of Infection Signs and Symptoms  Outcome: Progressing  Goal: Improved Oral Intake  Outcome: Progressing  Goal: Optimal Pain Control and Function  Outcome: Progressing

## 2024-10-19 LAB
ALBUMIN SERPL-MCNC: 1.7 G/DL (ref 3.4–4.8)
ALBUMIN/GLOB SERPL: 0.6 RATIO (ref 1.1–2)
ALLENS TEST BLOOD GAS (OHS): ABNORMAL
ALP SERPL-CCNC: 109 UNIT/L (ref 40–150)
ALT SERPL-CCNC: 106 UNIT/L (ref 0–55)
ANION GAP SERPL CALC-SCNC: 11 MEQ/L
AST SERPL-CCNC: 129 UNIT/L (ref 5–34)
BASE EXCESS BLD CALC-SCNC: 10.4 MMOL/L
BASE EXCESS BLD CALC-SCNC: 8.4 MMOL/L (ref -2–2)
BASOPHILS # BLD AUTO: 0.03 X10(3)/MCL
BASOPHILS NFR BLD AUTO: 0.2 %
BILIRUB SERPL-MCNC: 0.4 MG/DL
BLOOD GAS SAMPLE TYPE (OHS): ABNORMAL
BLOOD GAS SAMPLE TYPE (OHS): ABNORMAL
BUN SERPL-MCNC: 30.3 MG/DL (ref 8.4–25.7)
CA-I BLD-SCNC: 1.02 MMOL/L (ref 1.12–1.23)
CA-I BLD-SCNC: 1.06 MMOL/L (ref 1.12–1.23)
CALCIUM SERPL-MCNC: 7.3 MG/DL (ref 8.8–10)
CHLORIDE SERPL-SCNC: 98 MMOL/L (ref 98–107)
CO2 BLDA-SCNC: 34.9 MMOL/L
CO2 BLDA-SCNC: 35.6 MMOL/L
CO2 SERPL-SCNC: 26 MMOL/L (ref 23–31)
COHGB MFR BLDA: 1.7 % (ref 0.5–1.5)
CREAT SERPL-MCNC: 2.64 MG/DL (ref 0.72–1.25)
CREAT/UREA NIT SERPL: 11
DRAWN BY BLOOD GAS (OHS): ABNORMAL
DRAWN BY BLOOD GAS (OHS): ABNORMAL
EOSINOPHIL # BLD AUTO: 0.18 X10(3)/MCL (ref 0–0.9)
EOSINOPHIL NFR BLD AUTO: 0.9 %
ERYTHROCYTE [DISTWIDTH] IN BLOOD BY AUTOMATED COUNT: 15.8 % (ref 11.5–17)
ERYTHROCYTE [DISTWIDTH] IN BLOOD BY AUTOMATED COUNT: 15.9 % (ref 11.5–17)
GFR SERPLBLD CREATININE-BSD FMLA CKD-EPI: 27 ML/MIN/1.73/M2
GLOBULIN SER-MCNC: 2.9 GM/DL (ref 2.4–3.5)
GLUCOSE SERPL-MCNC: 167 MG/DL (ref 82–115)
HCO3 BLDA-SCNC: 33.4 MMOL/L (ref 22–26)
HCO3 BLDA-SCNC: 34.3 MMOL/L (ref 22–26)
HCT VFR BLD AUTO: 26.4 % (ref 42–52)
HCT VFR BLD AUTO: 27.1 % (ref 42–52)
HGB BLD-MCNC: 9 G/DL (ref 14–18)
HGB BLD-MCNC: 9.2 G/DL (ref 14–18)
IMM GRANULOCYTES # BLD AUTO: 0.22 X10(3)/MCL (ref 0–0.04)
IMM GRANULOCYTES NFR BLD AUTO: 1.1 %
INHALED O2 CONCENTRATION: 30 %
INHALED O2 CONCENTRATION: 30 %
LYMPHOCYTES # BLD AUTO: 1.05 X10(3)/MCL (ref 0.6–4.6)
LYMPHOCYTES NFR BLD AUTO: 5.3 %
MAGNESIUM SERPL-MCNC: 2.1 MG/DL (ref 1.6–2.6)
MCH RBC QN AUTO: 28.5 PG (ref 27–31)
MCH RBC QN AUTO: 28.8 PG (ref 27–31)
MCHC RBC AUTO-ENTMCNC: 33.9 G/DL (ref 33–36)
MCHC RBC AUTO-ENTMCNC: 34.1 G/DL (ref 33–36)
MCV RBC AUTO: 83.5 FL (ref 80–94)
MCV RBC AUTO: 85 FL (ref 80–94)
MECH RR (OHS): 20 B/MIN
METHGB MFR BLDA: 0.9 % (ref 0.4–1.5)
MODE (OHS): ABNORMAL
MODE (OHS): AC
MONOCYTES # BLD AUTO: 0.8 X10(3)/MCL (ref 0.1–1.3)
MONOCYTES NFR BLD AUTO: 4.1 %
NEUTROPHILS # BLD AUTO: 17.46 X10(3)/MCL (ref 2.1–9.2)
NEUTROPHILS NFR BLD AUTO: 88.4 %
NRBC BLD AUTO-RTO: 0.1 %
NRBC BLD AUTO-RTO: 0.1 %
O2 HB BLOOD GAS (OHS): 92.9 % (ref 94–97)
OXYGEN DEVICE BLOOD GAS (OHS): ABNORMAL
OXYGEN DEVICE BLOOD GAS (OHS): ABNORMAL
OXYHGB MFR BLDA: 9.1 G/DL (ref 12–16)
PCO2 BLDA: 42 MMHG (ref 35–45)
PCO2 BLDA: 48 MMHG (ref 35–45)
PEEP RESPIRATORY: 5 CMH2O
PEEP RESPIRATORY: 5 CMH2O
PH BLDA: 7.45 [PH] (ref 7.35–7.45)
PH BLDA: 7.52 [PH] (ref 7.35–7.45)
PHOSPHATE SERPL-MCNC: 4.9 MG/DL (ref 2.3–4.7)
PLATELET # BLD AUTO: 142 X10(3)/MCL (ref 130–400)
PLATELET # BLD AUTO: 153 X10(3)/MCL (ref 130–400)
PMV BLD AUTO: 9.8 FL (ref 7.4–10.4)
PMV BLD AUTO: 9.9 FL (ref 7.4–10.4)
PO2 BLDA: 70 MMHG (ref 80–100)
PO2 BLDA: 72 MMHG (ref 80–100)
POCT GLUCOSE: 116 MG/DL (ref 70–110)
POCT GLUCOSE: 118 MG/DL (ref 70–110)
POCT GLUCOSE: 193 MG/DL (ref 70–110)
POCT GLUCOSE: 234 MG/DL (ref 70–110)
POTASSIUM BLOOD GAS (OHS): 3.1 MMOL/L (ref 3.5–5)
POTASSIUM BLOOD GAS (OHS): 3.2 MMOL/L (ref 3.5–5)
POTASSIUM SERPL-SCNC: 3.5 MMOL/L (ref 3.5–5.1)
PROT SERPL-MCNC: 4.6 GM/DL (ref 5.8–7.6)
PS (OHS): 5 CMH2O
RBC # BLD AUTO: 3.16 X10(6)/MCL (ref 4.7–6.1)
RBC # BLD AUTO: 3.19 X10(6)/MCL (ref 4.7–6.1)
SAMPLE SITE BLOOD GAS (OHS): ABNORMAL
SAMPLE SITE BLOOD GAS (OHS): ABNORMAL
SAO2 % BLDA: 94.6 %
SAO2 % BLDA: 96 %
SODIUM BLOOD GAS (OHS): 131 MMOL/L (ref 137–145)
SODIUM BLOOD GAS (OHS): 132 MMOL/L (ref 137–145)
SODIUM SERPL-SCNC: 135 MMOL/L (ref 136–145)
SPONT+MECH VT ON VENT: 450 ML
VANCOMYCIN SERPL-MCNC: 13.5 UG/ML (ref 15–20)
WBC # BLD AUTO: 19.42 X10(3)/MCL (ref 4.5–11.5)
WBC # BLD AUTO: 19.74 X10(3)/MCL (ref 4.5–11.5)

## 2024-10-19 PROCEDURE — 36415 COLL VENOUS BLD VENIPUNCTURE: CPT

## 2024-10-19 PROCEDURE — 25000003 PHARM REV CODE 250

## 2024-10-19 PROCEDURE — 25000003 PHARM REV CODE 250: Performed by: STUDENT IN AN ORGANIZED HEALTH CARE EDUCATION/TRAINING PROGRAM

## 2024-10-19 PROCEDURE — 99900035 HC TECH TIME PER 15 MIN (STAT)

## 2024-10-19 PROCEDURE — 37799 UNLISTED PX VASCULAR SURGERY: CPT

## 2024-10-19 PROCEDURE — 85027 COMPLETE CBC AUTOMATED: CPT | Performed by: STUDENT IN AN ORGANIZED HEALTH CARE EDUCATION/TRAINING PROGRAM

## 2024-10-19 PROCEDURE — 63600175 PHARM REV CODE 636 W HCPCS: Performed by: STUDENT IN AN ORGANIZED HEALTH CARE EDUCATION/TRAINING PROGRAM

## 2024-10-19 PROCEDURE — 63600175 PHARM REV CODE 636 W HCPCS: Performed by: INTERNAL MEDICINE

## 2024-10-19 PROCEDURE — 99900031 HC PATIENT EDUCATION (STAT)

## 2024-10-19 PROCEDURE — 25000003 PHARM REV CODE 250: Performed by: INTERNAL MEDICINE

## 2024-10-19 PROCEDURE — 27100171 HC OXYGEN HIGH FLOW UP TO 24 HOURS

## 2024-10-19 PROCEDURE — 94761 N-INVAS EAR/PLS OXIMETRY MLT: CPT | Mod: XB

## 2024-10-19 PROCEDURE — 82803 BLOOD GASES ANY COMBINATION: CPT

## 2024-10-19 PROCEDURE — 94003 VENT MGMT INPAT SUBQ DAY: CPT

## 2024-10-19 PROCEDURE — 27000207 HC ISOLATION

## 2024-10-19 PROCEDURE — 85025 COMPLETE CBC W/AUTO DIFF WBC: CPT

## 2024-10-19 PROCEDURE — 80053 COMPREHEN METABOLIC PANEL: CPT

## 2024-10-19 PROCEDURE — 20000000 HC ICU ROOM

## 2024-10-19 PROCEDURE — 83735 ASSAY OF MAGNESIUM: CPT

## 2024-10-19 PROCEDURE — 84100 ASSAY OF PHOSPHORUS: CPT

## 2024-10-19 PROCEDURE — 63600175 PHARM REV CODE 636 W HCPCS

## 2024-10-19 PROCEDURE — 80202 ASSAY OF VANCOMYCIN: CPT | Performed by: INTERNAL MEDICINE

## 2024-10-19 PROCEDURE — A4216 STERILE WATER/SALINE, 10 ML: HCPCS | Performed by: STUDENT IN AN ORGANIZED HEALTH CARE EDUCATION/TRAINING PROGRAM

## 2024-10-19 PROCEDURE — 25000003 PHARM REV CODE 250: Mod: JZ,JG | Performed by: STUDENT IN AN ORGANIZED HEALTH CARE EDUCATION/TRAINING PROGRAM

## 2024-10-19 PROCEDURE — 27200966 HC CLOSED SUCTION SYSTEM

## 2024-10-19 RX ORDER — DEXMEDETOMIDINE HYDROCHLORIDE 4 UG/ML
INJECTION, SOLUTION INTRAVENOUS
Status: COMPLETED
Start: 2024-10-19 | End: 2024-10-19

## 2024-10-19 RX ORDER — CALCIUM GLUCONATE 20 MG/ML
1 INJECTION, SOLUTION INTRAVENOUS
Status: COMPLETED | OUTPATIENT
Start: 2024-10-19 | End: 2024-10-19

## 2024-10-19 RX ORDER — DEXMEDETOMIDINE HYDROCHLORIDE 4 UG/ML
0-1.4 INJECTION, SOLUTION INTRAVENOUS CONTINUOUS
Status: DISCONTINUED | OUTPATIENT
Start: 2024-10-19 | End: 2024-10-20

## 2024-10-19 RX ADMIN — OXYCODONE AND ACETAMINOPHEN 1 TABLET: 10; 325 TABLET ORAL at 02:10

## 2024-10-19 RX ADMIN — Medication 75 MCG/HR: at 07:10

## 2024-10-19 RX ADMIN — FENOFIBRATE 48 MG: 48 TABLET, FILM COATED ORAL at 08:10

## 2024-10-19 RX ADMIN — INSULIN ASPART 4 UNITS: 100 INJECTION, SOLUTION INTRAVENOUS; SUBCUTANEOUS at 06:10

## 2024-10-19 RX ADMIN — INSULIN ASPART 2 UNITS: 100 INJECTION, SOLUTION INTRAVENOUS; SUBCUTANEOUS at 10:10

## 2024-10-19 RX ADMIN — OXYCODONE AND ACETAMINOPHEN 1 TABLET: 10; 325 TABLET ORAL at 10:10

## 2024-10-19 RX ADMIN — OXYBUTYNIN CHLORIDE 5 MG: 5 TABLET ORAL at 08:10

## 2024-10-19 RX ADMIN — CALCIUM GLUCONATE 1 G: 20 INJECTION, SOLUTION INTRAVENOUS at 09:10

## 2024-10-19 RX ADMIN — AMPICILLIN SODIUM AND SULBACTAM SODIUM 3 G: 2; 1 INJECTION, POWDER, FOR SOLUTION INTRAMUSCULAR; INTRAVENOUS at 02:10

## 2024-10-19 RX ADMIN — SENNOSIDES AND DOCUSATE SODIUM 1 TABLET: 50; 8.6 TABLET ORAL at 08:10

## 2024-10-19 RX ADMIN — OXYBUTYNIN CHLORIDE 5 MG: 5 TABLET ORAL at 02:10

## 2024-10-19 RX ADMIN — CIPROFLOXACIN 400 MG: 2 INJECTION, SOLUTION INTRAVENOUS at 10:10

## 2024-10-19 RX ADMIN — GABAPENTIN 400 MG: 300 CAPSULE ORAL at 05:10

## 2024-10-19 RX ADMIN — OXYCODONE AND ACETAMINOPHEN 1 TABLET: 10; 325 TABLET ORAL at 07:10

## 2024-10-19 RX ADMIN — GABAPENTIN 400 MG: 300 CAPSULE ORAL at 09:10

## 2024-10-19 RX ADMIN — SENNOSIDES AND DOCUSATE SODIUM 1 TABLET: 50; 8.6 TABLET ORAL at 09:10

## 2024-10-19 RX ADMIN — POTASSIUM BICARBONATE 20 MEQ: 391 TABLET, EFFERVESCENT ORAL at 08:10

## 2024-10-19 RX ADMIN — AMPICILLIN SODIUM AND SULBACTAM SODIUM 3 G: 2; 1 INJECTION, POWDER, FOR SOLUTION INTRAMUSCULAR; INTRAVENOUS at 09:10

## 2024-10-19 RX ADMIN — VANCOMYCIN HYDROCHLORIDE 1250 MG: 1.25 INJECTION, POWDER, LYOPHILIZED, FOR SOLUTION INTRAVENOUS at 03:10

## 2024-10-19 RX ADMIN — OXYBUTYNIN CHLORIDE 5 MG: 5 TABLET ORAL at 09:10

## 2024-10-19 RX ADMIN — AMPICILLIN SODIUM AND SULBACTAM SODIUM 3 G: 2; 1 INJECTION, POWDER, FOR SOLUTION INTRAMUSCULAR; INTRAVENOUS at 05:10

## 2024-10-19 RX ADMIN — CALCIUM GLUCONATE 1 G: 20 INJECTION, SOLUTION INTRAVENOUS at 10:10

## 2024-10-19 RX ADMIN — HYDROCORTISONE SODIUM SUCCINATE 100 MG: 100 INJECTION, POWDER, FOR SOLUTION INTRAMUSCULAR; INTRAVENOUS at 05:10

## 2024-10-19 RX ADMIN — DEXMEDETOMIDINE HYDROCHLORIDE 0.2 MCG/KG/HR: 4 INJECTION, SOLUTION INTRAVENOUS at 08:10

## 2024-10-19 RX ADMIN — SODIUM CHLORIDE, PRESERVATIVE FREE 10 ML: 5 INJECTION INTRAVENOUS at 12:10

## 2024-10-19 RX ADMIN — ATORVASTATIN CALCIUM 20 MG: 10 TABLET, FILM COATED ORAL at 09:10

## 2024-10-19 RX ADMIN — SODIUM CHLORIDE, PRESERVATIVE FREE 10 ML: 5 INJECTION INTRAVENOUS at 05:10

## 2024-10-19 RX ADMIN — FLUOXETINE HYDROCHLORIDE 20 MG: 20 CAPSULE ORAL at 08:10

## 2024-10-19 RX ADMIN — GABAPENTIN 400 MG: 300 CAPSULE ORAL at 01:10

## 2024-10-19 RX ADMIN — DEXMEDETOMIDINE HYDROCHLORIDE 0.2 MCG/KG/HR: 400 INJECTION INTRAVENOUS at 08:10

## 2024-10-19 RX ADMIN — CIPROFLOXACIN 400 MG: 2 INJECTION, SOLUTION INTRAVENOUS at 09:10

## 2024-10-19 RX ADMIN — PANTOPRAZOLE SODIUM 40 MG: 40 INJECTION, POWDER, LYOPHILIZED, FOR SOLUTION INTRAVENOUS at 08:10

## 2024-10-19 NOTE — PLAN OF CARE
Problem: Adult Inpatient Plan of Care  Goal: Plan of Care Review  Outcome: Progressing  Goal: Patient-Specific Goal (Individualized)  Outcome: Progressing  Goal: Absence of Hospital-Acquired Illness or Injury  Outcome: Progressing  Goal: Optimal Comfort and Wellbeing  Outcome: Progressing  Goal: Readiness for Transition of Care  Outcome: Progressing      No
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
## 2024-10-19 NOTE — PROGRESS NOTES
Pharmacokinetic Assessment Follow Up: IV Vancomycin    Vancomycin serum concentration assessment(s):    The random level was drawn correctly and can be used to guide therapy at this time. The measurement is below the desired definitive target range of 15 to 20 mcg/mL.    Vancomycin Regimen Plan:    Give 1250mg x 1 dose at 0400 on 10/19.  Re-dose when the random level is less than 20 mcg/mL, next level to be drawn at 0430 on 10/20    Drug levels (last 3 results):  Recent Labs   Lab Result Units 10/18/24  0241 10/19/24  0123   Vancomycin Random ug/ml 21.3* 13.5*       Pharmacy will continue to follow and monitor vancomycin.    Please contact pharmacy at extension [phone] for questions regarding this assessment.    Thank you for the consult,   Robb Fragoso       Patient brief summary:  Bianca Khan is a 59 y.o. male initiated on antimicrobial therapy with IV Vancomycin for treatment of sepsis    The patient's current regimen is pulse dose    Drug Allergies:   Review of patient's allergies indicates:   Allergen Reactions    Baclofen Itching and Anxiety       Actual Body Weight:   79.3kg    Renal Function:   Estimated Creatinine Clearance: 30.7 mL/min (A) (based on SCr of 2.64 mg/dL (H)).,     Dialysis Method (if applicable):  N/A    CBC (last 72 hours):  Recent Labs   Lab Result Units 10/17/24  0152 10/17/24  0523 10/17/24  0615 10/17/24  0912 10/17/24  1007 10/17/24  1219 10/17/24  1353 10/17/24  1600 10/17/24  1841 10/17/24  2010 10/17/24  2203 10/17/24  2356 10/18/24  0241 10/18/24  0555 10/18/24  1409 10/18/24  2230 10/19/24  0133   WBC x10(3)/mcL 23.34* 24.85*  --  22.20*  --   --   --   --   --   --   --   --  21.77*  --  23.38* 19.32* 19.42*  19.74*   Hgb g/dL 5.2* 7.0* 6.9* 9.4* 9.7* 9.3* 8.3* 7.9* 7.6* 7.3* 7.3* 7.1* 7.1* 6.9* 11.0* 9.1* 9.2*  9.0*   Hct % 16.8* 21.6* 21.3* 27.5* 27.7* 26.1* 23.0* 21.6* 20.7* 20.2* 19.8* 19.5* 19.7* 18.9* 31.2* 25.5* 27.1*  26.4*   Platelet x10(3)/mcL 372 216  --  222  --    --   --   --   --   --   --   --  201  --  157 149 153  142   Mono % %  --  7.0  --  2.7  --   --   --   --   --   --   --   --  4.6  --   --   --  4.1   Eos % %  --  0.0  --  0.0  --   --   --   --   --   --   --   --  0.0  --   --   --  0.9   Basophil % %  --  0.2  --  0.3  --   --   --   --   --   --   --   --  0.3  --   --   --  0.2       Metabolic Panel (last 72 hours):  Recent Labs   Lab Result Units 10/17/24  0130 10/17/24  0152 10/17/24  0604 10/17/24  0615 10/17/24  0912 10/17/24  0930 10/17/24  1837 10/18/24  0241 10/18/24  0726 10/18/24  1132 10/19/24  0123 10/19/24  0437   Sodium mmol/L  --  135*  --  139 140  --   --  137  --   --  135*  --    Sodium, Blood Gas mmol/L 132*  --  132*  --   --  130*  --   --  130* 128*  --  131*   Potassium mmol/L  --  5.4*  --  5.6* 5.1  --   --  3.6  --   --  3.5  --    Potassium, Blood Gas mmol/L 5.0  --  5.3*  --   --  4.6  --   --  3.2* 3.3*  --  3.2*   Chloride mmol/L  --  108*  --  106 103  --   --  96*  --   --  98  --    CO2 mmol/L  --  11*  --  16* 21*  --   --  28  --   --  26  --    Glucose mg/dL  --  323*  --  253* 181*  --   --  208*  --   --  167*  --    Glucose, UA   --   --   --   --   --   --  Normal  --   --   --   --   --    Blood Urea Nitrogen mg/dL  --  14.7  --  16.4 18.4  --   --  23.7  --   --  30.3*  --    Creatinine mg/dL  --  1.66*  --  1.94* 1.96*  --   --  2.57*  --   --  2.64*  --    Albumin g/dL  --  1.7*  --  1.6* 2.1*  --   --  1.8*  --   --  1.7*  --    Bilirubin Total mg/dL  --  0.2  --  0.2 0.4  --   --  0.4  --   --  0.4  --    ALP unit/L  --  52  --  41 51  --   --  80  --   --  109  --    AST unit/L  --  14  --  77* 178*  --   --  266*  --   --  129*  --    ALT unit/L  --  7  --  39 97*  --   --  124*  --   --  106*  --    Magnesium Level mg/dL  --  1.70  --  1.70 1.50*  --   --  1.90  --   --  2.10  --    Phosphorus Level mg/dL  --  7.3*  --  9.5* 8.4*  --   --  5.4*  --   --  4.9*  --        Vancomycin  Administrations:  vancomycin given in the last 96 hours                     vancomycin 1,250 mg in D5W 250 mL IVPB (admixture device) (mg) 1,250 mg New Bag 10/19/24 0359    vancomycin 1,500 mg in D5W 250 mL IVPB (admixture device) (mg) 1,500 mg New Bag 10/17/24 1316                    Microbiologic Results:  Microbiology Results (last 7 days)       Procedure Component Value Units Date/Time    Blood Culture [[id number]]  (Normal) Collected: 10/17/24 0942    Order Status: Completed Specimen: Blood Updated: 10/18/24 1101     Blood Culture No Growth At 24 Hours    Blood Culture [[id number]]  (Normal) Collected: 10/17/24 0942    Order Status: Completed Specimen: Blood Updated: 10/18/24 1101     Blood Culture No Growth At 24 Hours    Blood Culture [[id number]]     Order Status: Canceled Specimen: Blood     Blood Culture [[id number]]     Order Status: Canceled Specimen: Blood     Blood culture #2 **CANNOT BE ORDERED STAT** [[id number]]  (Normal) Collected: 10/09/24 0521    Order Status: Completed Specimen: Blood from Arm, Right Updated: 10/14/24 1101     Blood Culture No Growth at 5 days    Blood culture #1 **CANNOT BE ORDERED STAT** [[id number]]  (Normal) Collected: 10/08/24 1817    Order Status: Completed Specimen: Blood Updated: 10/13/24 1900     Blood Culture No Growth at 5 days    Wound Culture [[id number]]  (Abnormal)  (Susceptibility) Collected: 10/09/24 1011    Order Status: Completed Specimen: Wound from Sacral Updated: 10/12/24 1219     Wound Culture Many ACINETOBACTER BAUMANNII     Comment: CRAB (Carbapenem-resistant Acinetobacter baumannii)         Many Escherichia coli ESBL      Moderate Enterococcus faecalis    Anaerobic Culture [[id number]]  (Abnormal) Collected: 10/09/24 1011    Order Status: Completed Specimen: Wound from Sacral Updated: 10/12/24 1123     Anaerobe Culture Bacteroides thetaiotaomicron      Peptoniphilus asaccharolyticus     Comment: Anaerobic sensitivity is not usually indicated except on  single isolates from sterile body sites.

## 2024-10-19 NOTE — PROGRESS NOTES
Infectious Diseases Progress Note  60 y/o male with past medical history of T-spine cord injury with paraplegia, diabetes, HTN, hepatitis-C, chronic MRSA L ankle wound/osteomyelitis, was on suppressive doxycycline and R knee infection/septic arthritis and osteomyelitis with GBS/Enterococcus and Ecoli isolates who presented to ER on 10/8 with increased generalized pain, increased foul smelling drainage from sacral wound with fever and chills. He was noted to be hypotensive per EMS. On admit he was afebrile without leukocytosis. ESR 98 and .80. MRSA PCR (-). U/A with 21-50 WBC, 0-5 RBC, 75 LE, no bacteria, negative nitrites. Urine culture negative. Blood cultures negative. Sacral wound culture with many Acinetobacter, many ESBL Ecoli, moderate Enterococcus, Bacteroides and Peptoniphilus. CT pelvis with contrast showed worsening inflammatory change of the sacral decubitus ulcers with gas now identified inferior to the right pubic ramus, stool noted throughout the colon as may be seen with constipation, pyelonephritis is not excluded. Seen by Surgery and underwent debridement of sacral wound. During his stay he was noted to have large amounts of bleeding from sacral wound. CT abdomen/pelvis on 10/17 showed interval development of L renal rupture with large subcapsular hematoma, L retroperitoneal hemorrhage. He received multiple blood/blood products over the past couple days. He was noted to be hypotensive and was transferred to ICU on 10/17. He was seen by Vascular and underwent Aortogram,  left renal angiography with coil embolization of left renal artery and right groin central venous line insertion on 10/17.   He is currently on Unasyn and Cipro. Remains on chronic oral suppression with Doxycycline    Subjective:  In ICU, intubated and on vent. No acute distress noted. No new complaints reported. Afebrile. Sister present    Review of Systems   Unable to perform ROS: Intubated       Review of patient's  allergies indicates:   Allergen Reactions    Baclofen Itching and Anxiety       Past Medical History:   Diagnosis Date    Arthritis     Chronic ulcer of ankle 05/26/2022    ESBL (extended spectrum beta-lactamase) producing bacteria infection 08/30/2024    Frequent UTI 07/02/2019    Generalized anxiety disorder 05/26/2022    Neurogenic bladder 05/26/2022    Osteomyelitis 05/26/2022    Paraplegia     Presence of suprapubic catheter 05/26/2022    Pure hypercholesterolemia 05/26/2022    Retention of urine, unspecified 08/09/2019    Spinal cord injury at T1-T6 level 04/20/2018       Past Surgical History:   Procedure Laterality Date    ANGIOGRAM, RENAL ARTERIES, BILATERAL N/A 10/17/2024    Procedure: Angiogram, Renal Arteries, Bilateral;  Surgeon: Pati King MD;  Location: [facility] CATH LAB;  Service: Peripheral Vascular;  Laterality: N/A;  left renal artery coiling    APPLICATION OF WOUND VACUUM-ASSISTED CLOSURE DEVICE N/A 10/10/2024    Procedure: APPLICATION, WOUND VAC;  Surgeon: Rob Sinclair MD;  Location: [facility];  Service: General;  Laterality: N/A;    EGD, WITH CLOSED BIOPSY N/A 8/29/2024    Procedure: EGD, WITH CLOSED BIOPSY;  Surgeon: Mikal Segovia MD;  Location: [facility] ENDOSCOPY;  Service: Gastroenterology;  Laterality: N/A;    ESOPHAGOGASTRODUODENOSCOPY N/A 8/29/2024    Procedure: EGD;  Surgeon: Mikal Segovia MD;  Location: [facility] ENDOSCOPY;  Service: Gastroenterology;  Laterality: N/A;    FRACTURE SURGERY  2021    INCISION AND DRAINAGE, LOWER EXTREMITY Right 06/29/2023    Procedure: INCISION AND DRAINAGE, LOWER EXTREMITY;  Surgeon: Prabhu Shen DO;  Location: [facility] OR;  Service: Orthopedics;  Laterality: Right;  supine bone foam wash stuff cultures    INCISION AND DRAINAGE, LOWER EXTREMITY Right 11/30/2023    Procedure: INCISION AND DRAINAGE, LOWER EXTREMITY;  Surgeon: Prabhu Shen DO;  Location: [facility];  Service: Orthopedics;  Laterality: Right;  supine any table bone foam wash stuff  possible wound vac    INCISION AND DRAINAGE, LOWER EXTREMITY Right 2023    Procedure: INCISION AND DRAINAGE, LOWER EXTREMITY;  Surgeon: Prabhu Shen DO;  Location: [facility] OR;  Service: Orthopedics;  Laterality: Right;  supine bone foam vascular bed removal of distal femoral plate, wash stuff, possible AKA    INSERTION OF INTRAMEDULLARY MARISA Right 08/10/2022    Procedure: INSERTION, INTRAMEDULLARY MARISA RIGHT TIBIA;  Surgeon: Jorge Orellana MD;  Location: [facility] OR;  Service: Orthopedics;  Laterality: Right;    JOINT REPLACEMENT      Ankel    PRESSURE ULCER DEBRIDEMENT N/A 10/10/2024    Procedure: DEBRIDEMENT, PRESSURE ULCER;  Surgeon: Rob Sinclair MD;  Location: [facility] OR;  Service: General;  Laterality: N/A;  sacral wound, R buttock wound    REMOVAL OF HARDWARE FROM LOWER EXTREMITY Right 2023    Procedure: REMOVAL, HARDWARE, LOWER EXTREMITY;  Surgeon: Prabhu Shen DO;  Location: [facility] OR;  Service: Orthopedics;  Laterality: Right;    SPINE SURGERY  2018       Social History     Socioeconomic History    Marital status:    Tobacco Use    Smoking status: Some Days     Current packs/day: 0.00     Average packs/day: 0.2 packs/day for 41.5 years (10.4 ttl pk-yrs)     Types: Cigars, Cigarettes     Start date: 1982     Last attempt to quit: 2023     Years since quittin.2    Smokeless tobacco: Never   Substance and Sexual Activity    Alcohol use: Not Currently     Alcohol/week: 2.0 standard drinks of alcohol     Types: 2 Cans of beer per week    Drug use: Not Currently     Types: Oxycodone    Sexual activity: Not Currently     Partners: Female     Birth control/protection: None     Social Drivers of Health     Financial Resource Strain: Low Risk  (10/10/2024)    Overall Financial Resource Strain (CARDIA)     Difficulty of Paying Living Expenses: Not hard at all   Food Insecurity: No Food Insecurity (10/10/2024)    Hunger Vital Sign     Worried About Running Out of Food in the Last  Year: Never true     Ran Out of Food in the Last Year: Never true   Transportation Needs: No Transportation Needs (10/10/2024)    TRANSPORTATION NEEDS     Transportation : No   Physical Activity: Inactive (12/11/2023)    Exercise Vital Sign     Days of Exercise per Week: 0 days     Minutes of Exercise per Session: 0 min   Stress: No Stress Concern Present (10/10/2024)    [employer] [place] [employer] - Occupational Stress Questionnaire     Feeling of Stress : Only a little   Housing Stability: Low Risk  (10/10/2024)    Housing Stability Vital Sign     Unable to Pay for Housing in the Last Year: No     Homeless in the Last Year: No         Scheduled Meds:   ampicillin-sulbactam  3 g Intravenous Q8H    atorvastatin  20 mg Per NG tube Nightly    calcium gluconate 1 g  1 g Intravenous Once    ciprofloxacin  400 mg Intravenous Q12H    fenofibrate  48 mg Per NG tube Daily    FLUoxetine  20 mg Per G Tube Daily    gabapentin  400 mg Per NG tube Q8H    hydrocortisone sodium succinate  100 mg Intravenous Q8H    [START ON 10/21/2024] mupirocin   Nasal BID    oxybutynin  5 mg Per NG tube TID    pantoprazole  40 mg Intravenous Daily    senna-docusate 8.6-50 mg  1 tablet Oral BID    sodium chloride 0.9%  10 mL Intravenous Q6H     Continuous Infusions:   fentanyl  0-250 mcg/hr Intravenous Continuous 10 mL/hr at 10/18/24 1754 100 mcg/hr at 10/18/24 1754    vasopressin  0.1 Units/min Intravenous Continuous   Stopped at 10/18/24 0850     PRN Meds:  Current Facility-Administered Medications:     0.9%  NaCl infusion (for blood administration), , Intravenous, Q24H PRN    0.9%  NaCl infusion (for blood administration), , Intravenous, Q24H PRN    0.9%  NaCl infusion (for blood administration), , Intravenous, Q24H PRN    0.9%  NaCl infusion (for blood administration), , Intravenous, Q24H PRN    0.9%  NaCl infusion (for blood administration), , Intravenous, Q24H PRN    0.9%  NaCl infusion (for blood administration), ,  "Intravenous, Q24H PRN    0.9%  NaCl infusion (for blood administration), , Intravenous, Q24H PRN    0.9%  NaCl infusion (for blood administration), , Intravenous, Q24H PRN    acetaminophen, 650 mg, Oral, Q4H PRN    aluminum-magnesium hydroxide-simethicone, 30 mL, Oral, QID PRN    dextrose 10%, 12.5 g, Intravenous, PRN    dextrose 10%, 25 g, Intravenous, PRN    diphenhydrAMINE, 50 mg, Intravenous, Q6H PRN    etomidate, , Intravenous, Code/trauma/sedation Med    glucagon (human recombinant), 1 mg, Intramuscular, PRN    glucose, 16 g, Oral, PRN    glucose, 24 g, Oral, PRN    hyoscyamine, 0.125 mg, Sublingual, Q4H PRN    insulin aspart U-100, 1-10 Units, Subcutaneous, QID (AC + HS) PRN    melatonin, 6 mg, Oral, Nightly PRN    methocarbamoL, 750 mg, Oral, TID PRN    morphine, 2 mg, Intravenous, BID PRN    ondansetron, 4 mg, Intravenous, Q4H PRN    oxyCODONE-acetaminophen, 1 tablet, Oral, Q4H PRN    polyethylene glycol, 17 g, Oral, BID PRN    prochlorperazine, 5 mg, Intravenous, Q6H PRN    rocuronium, , Intravenous, Code/trauma/sedation Med    sodium chloride 0.9%, 10 mL, Intravenous, PRN    Flushing PICC/Midline Protocol, , , Until Discontinued **AND** sodium chloride 0.9%, 10 mL, Intravenous, Q6H **AND** sodium chloride 0.9%, 10 mL, Intravenous, PRN    traZODone, 200 mg, Oral, Nightly PRN    Pharmacy to dose Vancomycin consult, , , Once **AND** vancomycin - pharmacy to dose, , Intravenous, pharmacy to manage frequency    Objective:  /73   Pulse 102   Temp 99.7 °F (37.6 °C) (Core Bladder)   Resp (!) 21   Ht 5' 10" (1.778 m)   Wt 79.3 kg (174 lb 13.2 oz)   SpO2 98%   BMI 25.08 kg/m²     Physical Exam:   Physical Exam  Vitals reviewed.   HENT:      Head: Normocephalic.   Cardiovascular:      Rate and Rhythm: Normal rate and regular rhythm.   Pulmonary:      Effort: Pulmonary effort is normal. No respiratory distress.      Breath sounds: Normal breath sounds. No wheezing.      Comments: Intubated and on " vent  Abdominal:      General: Bowel sounds are normal. There is no distension.      Palpations: Abdomen is soft.      Tenderness: There is no abdominal tenderness.      Comments: YVONNE de la rosa NGANTON   Genitourinary:     Comments: Suprapubic catheter  Musculoskeletal:      Cervical back: Normal range of motion.      Comments: Paraplegia   Skin:     Findings: No rash.      Comments: Wounds dressed   Neurological:      Mental Status: He is alert.      Comments: Unable to fully assess, pt intubated and on vent     Imaging    Lab Review   Recent Results (from the past 24 hours)   Hemoglobin and Hematocrit    Collection Time: 10/17/24 11:56 PM   Result Value Ref Range    Hgb 7.1 (L) 14.0 - 18.0 g/dL    Hct 19.5 (LL) 42.0 - 52.0 %   Vancomycin, Random    Collection Time: 10/18/24  2:41 AM   Result Value Ref Range    Vancomycin Random 21.3 (H) 15.0 - 20.0 ug/ml   Comprehensive Metabolic Panel    Collection Time: 10/18/24  2:41 AM   Result Value Ref Range    Sodium 137 136 - 145 mmol/L    Potassium 3.6 3.5 - 5.1 mmol/L    Chloride 96 (L) 98 - 107 mmol/L    CO2 28 23 - 31 mmol/L    Glucose 208 (H) 82 - 115 mg/dL    Blood Urea Nitrogen 23.7 8.4 - 25.7 mg/dL    Creatinine 2.57 (H) 0.72 - 1.25 mg/dL    Calcium 7.3 (L) 8.8 - 10.0 mg/dL    Protein Total 4.6 (L) 5.8 - 7.6 gm/dL    Albumin 1.8 (L) 3.4 - 4.8 g/dL    Globulin 2.8 2.4 - 3.5 gm/dL    Albumin/Globulin Ratio 0.6 (L) 1.1 - 2.0 ratio    Bilirubin Total 0.4 <=1.5 mg/dL    ALP 80 40 - 150 unit/L     (H) 0 - 55 unit/L     (H) 5 - 34 unit/L    eGFR 28 mL/min/1.73/m2    Anion Gap 13.0 mEq/L    BUN/Creatinine Ratio 9    Magnesium    Collection Time: 10/18/24  2:41 AM   Result Value Ref Range    Magnesium Level 1.90 1.60 - 2.60 mg/dL   Phosphorus    Collection Time: 10/18/24  2:41 AM   Result Value Ref Range    Phosphorus Level 5.4 (H) 2.3 - 4.7 mg/dL   CBC with Differential    Collection Time: 10/18/24  2:41 AM   Result Value Ref Range    WBC 21.77 (H) 4.50 - 11.50  x10(3)/mcL    RBC 2.35 (L) 4.70 - 6.10 x10(6)/mcL    Hgb 7.1 (L) 14.0 - 18.0 g/dL    Hct 19.7 (LL) 42.0 - 52.0 %    MCV 83.8 80.0 - 94.0 fL    MCH 30.2 27.0 - 31.0 pg    MCHC 36.0 33.0 - 36.0 g/dL    RDW 15.1 11.5 - 17.0 %    Platelet 201 130 - 400 x10(3)/mcL    MPV 10.2 7.4 - 10.4 fL    Neut % 82.1 %    Lymph % 11.3 %    Mono % 4.6 %    Eos % 0.0 %    Basophil % 0.3 %    Lymph # 2.45 0.6 - 4.6 x10(3)/mcL    Neut # 17.90 (H) 2.1 - 9.2 x10(3)/mcL    Mono # 1.00 0.1 - 1.3 x10(3)/mcL    Eos # 0.00 0 - 0.9 x10(3)/mcL    Baso # 0.06 <=0.2 x10(3)/mcL    IG# 0.36 (H) 0 - 0.04 x10(3)/mcL    IG% 1.7 %    NRBC% 0.1 %   Hemoglobin and Hematocrit    Collection Time: 10/18/24  5:55 AM   Result Value Ref Range    Hgb 6.9 (L) 14.0 - 18.0 g/dL    Hct 18.9 (LL) 42.0 - 52.0 %   POCT glucose    Collection Time: 10/18/24  5:58 AM   Result Value Ref Range    POCT Glucose 203 (H) 70 - 110 mg/dL   RT Blood Gas    Collection Time: 10/18/24  7:26 AM   Result Value Ref Range    Sample Type Arterial Blood     Sample site Arterial Line     Drawn by sd rrt     pH, Blood gas 7.610 (HH) 7.350 - 7.450    pCO2, Blood gas 34.0 (L) 35.0 - 45.0 mmHg    pO2, Blood gas 68.0 (L) 80.0 - 100.0 mmHg    Sodium, Blood Gas 130 (L) 137 - 145 mmol/L    Potassium, Blood Gas 3.2 (L) 3.5 - 5.0 mmol/L    Calcium Level Ionized 0.94 (L) 1.12 - 1.23 mmol/L    TOC2, Blood gas 35.2 mmol/L    Base Excess, Blood gas 11.80 (H) -2.00 - 2.00 mmol/L    sO2, Blood gas 96.2 %    HCO3, Blood gas 34.2 (H) 22.0 - 26.0 mmol/L    THb, Blood gas 6.9 (L) 12 - 16 g/dL    O2 Hb, Blood Gas 94.9 94.0 - 97.0 %    CO Hgb 1.5 0.5 - 1.5 %    Met Hgb 0.5 0.4 - 1.5 %    Allens Test N/A     MODE AC     Oxygen Device, Blood gas Ventilator     FIO2, Blood gas 30 %    Mech Vt 450 ml    Mech RR 24 b/min    PEEP 5.0 cmH2O   RT Blood Gas    Collection Time: 10/18/24 11:32 AM   Result Value Ref Range    Sample Type Arterial Blood     Sample site Arterial Line     Drawn by sd rrt     pH, Blood gas 7.510  (H) 7.350 - 7.450    pCO2, Blood gas 42.0 35.0 - 45.0 mmHg    pO2, Blood gas 71.0 (L) 80.0 - 100.0 mmHg    Sodium, Blood Gas 128 (L) 137 - 145 mmol/L    Potassium, Blood Gas 3.3 (L) 3.5 - 5.0 mmol/L    Calcium Level Ionized 1.08 (L) 1.12 - 1.23 mmol/L    TOC2, Blood gas 34.8 mmol/L    Base Excess, Blood gas 9.50 (H) -2.00 - 2.00 mmol/L    sO2, Blood gas 95.6 %    HCO3, Blood gas 33.5 (H) 22.0 - 26.0 mmol/L    THb, Blood gas 11.4 (L) 12 - 16 g/dL    O2 Hb, Blood Gas 94.8 94.0 - 97.0 %    CO Hgb 3.1 (H) 0.5 - 1.5 %    Met Hgb 0.6 0.4 - 1.5 %    Allens Test Yes     MODE AC     Oxygen Device, Blood gas Ventilator     FIO2, Blood gas 30 %    Mech Vt 450 ml    Mech RR 20 b/min    PEEP 5.0 cmH2O   POCT glucose    Collection Time: 10/18/24  2:02 PM   Result Value Ref Range    POCT Glucose 104 70 - 110 mg/dL   CBC Without Differential    Collection Time: 10/18/24  2:09 PM   Result Value Ref Range    WBC 23.38 (H) 4.50 - 11.50 x10(3)/mcL    RBC 3.80 (L) 4.70 - 6.10 x10(6)/mcL    Hgb 11.0 (L) 14.0 - 18.0 g/dL    Hct 31.2 (L) 42.0 - 52.0 %    MCV 82.1 80.0 - 94.0 fL    MCH 28.9 27.0 - 31.0 pg    MCHC 35.3 33.0 - 36.0 g/dL    RDW 15.4 11.5 - 17.0 %    Platelet 157 130 - 400 x10(3)/mcL    MPV 9.8 7.4 - 10.4 fL    NRBC% 0.1 %   POCT glucose    Collection Time: 10/18/24  9:46 PM   Result Value Ref Range    POCT Glucose 151 (H) 70 - 110 mg/dL   CBC Without Differential    Collection Time: 10/18/24 10:30 PM   Result Value Ref Range    WBC 19.32 (H) 4.50 - 11.50 x10(3)/mcL    RBC 3.15 (L) 4.70 - 6.10 x10(6)/mcL    Hgb 9.1 (L) 14.0 - 18.0 g/dL    Hct 25.5 (L) 42.0 - 52.0 %    MCV 81.0 80.0 - 94.0 fL    MCH 28.9 27.0 - 31.0 pg    MCHC 35.7 33.0 - 36.0 g/dL    RDW 15.8 11.5 - 17.0 %    Platelet 149 130 - 400 x10(3)/mcL    MPV 9.8 7.4 - 10.4 fL    NRBC% 0.0 %       Assessment/Plan:  Sepsis - resolved  Sacral wound infection - CR Acinetobacter / ESBL Ecoli / Enterococcus / Bacteroides / Peptoniphilus isolates  ANTON  Pyelonephritis per CT    Constipation  Paraplegia  Neurogenic bladder with suprapubic catheter  DM Type II  MRSA L ankle osteomyelitis with retained hardware  Anemia    -Continue Unasyn #5 and Cipro #5  -Plan a 14 day course unless MRI R hip warrants longer course  -Remains afebrile with leukocytosis  -Pt with significant anemia, CT abdomen/pelvis showed L renal rupture with large subcapsular hematoma  -Seen by Vascular, inputs noted. S/P aortogram, left renal angiography with coil embolization of left renal artery on 10/17  -Received multiple units of blood/blood products during his stay  -CT pelvis with contrast showed worsening inflammatory change of sacral ulcer with gas inferior to the R pubic ramus  -Seen by Surgery, inputs noted  -S/P debridement of sacral wound on 10/10  -Sacral wound cultures revealing CR Acinetobacter, ESBL Ecoli, Enterococcus, Bacteroides, Peptoniphilus isolates  -Continue wound care  -Blood cultures negative   -Vent management and ICU support per Intensivist  -Discussed with sister and nursing staff

## 2024-10-20 LAB
ALBUMIN SERPL-MCNC: 1.7 G/DL (ref 3.4–4.8)
ALBUMIN/GLOB SERPL: 0.4 RATIO (ref 1.1–2)
ALP SERPL-CCNC: 109 UNIT/L (ref 40–150)
ALT SERPL-CCNC: 86 UNIT/L (ref 0–55)
ANION GAP SERPL CALC-SCNC: 8 MEQ/L
AST SERPL-CCNC: 82 UNIT/L (ref 5–34)
BASOPHILS # BLD AUTO: 0.02 X10(3)/MCL
BASOPHILS NFR BLD AUTO: 0.1 %
BILIRUB SERPL-MCNC: 0.5 MG/DL
BUN SERPL-MCNC: 39.3 MG/DL (ref 8.4–25.7)
CALCIUM SERPL-MCNC: 8.1 MG/DL (ref 8.8–10)
CHLORIDE SERPL-SCNC: 96 MMOL/L (ref 98–107)
CO2 SERPL-SCNC: 33 MMOL/L (ref 23–31)
CREAT SERPL-MCNC: 2.17 MG/DL (ref 0.72–1.25)
CREAT/UREA NIT SERPL: 18
EOSINOPHIL # BLD AUTO: 0 X10(3)/MCL (ref 0–0.9)
EOSINOPHIL NFR BLD AUTO: 0 %
ERYTHROCYTE [DISTWIDTH] IN BLOOD BY AUTOMATED COUNT: 15.5 % (ref 11.5–17)
ERYTHROCYTE [DISTWIDTH] IN BLOOD BY AUTOMATED COUNT: 15.6 % (ref 11.5–17)
GFR SERPLBLD CREATININE-BSD FMLA CKD-EPI: 34 ML/MIN/1.73/M2
GLOBULIN SER-MCNC: 3.9 GM/DL (ref 2.4–3.5)
GLUCOSE SERPL-MCNC: 76 MG/DL (ref 82–115)
HCT VFR BLD AUTO: 22.4 % (ref 42–52)
HCT VFR BLD AUTO: 23.6 % (ref 42–52)
HCT VFR BLD AUTO: 26.1 % (ref 42–52)
HGB BLD-MCNC: 7.5 G/DL (ref 14–18)
HGB BLD-MCNC: 7.8 G/DL (ref 14–18)
HGB BLD-MCNC: 8.6 G/DL (ref 14–18)
IMM GRANULOCYTES # BLD AUTO: 0.08 X10(3)/MCL (ref 0–0.04)
IMM GRANULOCYTES NFR BLD AUTO: 0.6 %
LYMPHOCYTES # BLD AUTO: 1.28 X10(3)/MCL (ref 0.6–4.6)
LYMPHOCYTES NFR BLD AUTO: 9 %
MAGNESIUM SERPL-MCNC: 2.1 MG/DL (ref 1.6–2.6)
MCH RBC QN AUTO: 28.4 PG (ref 27–31)
MCH RBC QN AUTO: 29 PG (ref 27–31)
MCHC RBC AUTO-ENTMCNC: 33 G/DL (ref 33–36)
MCHC RBC AUTO-ENTMCNC: 33.5 G/DL (ref 33–36)
MCV RBC AUTO: 84.8 FL (ref 80–94)
MCV RBC AUTO: 87.9 FL (ref 80–94)
MONOCYTES # BLD AUTO: 0.72 X10(3)/MCL (ref 0.1–1.3)
MONOCYTES NFR BLD AUTO: 5.1 %
NEUTROPHILS # BLD AUTO: 12.13 X10(3)/MCL (ref 2.1–9.2)
NEUTROPHILS NFR BLD AUTO: 85.2 %
NRBC BLD AUTO-RTO: 0 %
NRBC BLD AUTO-RTO: 0 %
PHOSPHATE SERPL-MCNC: 4.3 MG/DL (ref 2.3–4.7)
PLATELET # BLD AUTO: 152 X10(3)/MCL (ref 130–400)
PLATELET # BLD AUTO: 161 X10(3)/MCL (ref 130–400)
PMV BLD AUTO: 10.5 FL (ref 7.4–10.4)
PMV BLD AUTO: 9.9 FL (ref 7.4–10.4)
POCT GLUCOSE: 100 MG/DL (ref 70–110)
POCT GLUCOSE: 120 MG/DL (ref 70–110)
POCT GLUCOSE: 78 MG/DL (ref 70–110)
POCT GLUCOSE: 84 MG/DL (ref 70–110)
POCT GLUCOSE: 96 MG/DL (ref 70–110)
POTASSIUM SERPL-SCNC: 3.6 MMOL/L (ref 3.5–5.1)
PROT SERPL-MCNC: 5.6 GM/DL (ref 5.8–7.6)
RBC # BLD AUTO: 2.64 X10(6)/MCL (ref 4.7–6.1)
RBC # BLD AUTO: 2.97 X10(6)/MCL (ref 4.7–6.1)
SODIUM SERPL-SCNC: 137 MMOL/L (ref 136–145)
VANCOMYCIN SERPL-MCNC: 18.7 UG/ML (ref 15–20)
WBC # BLD AUTO: 13.53 X10(3)/MCL (ref 4.5–11.5)
WBC # BLD AUTO: 14.23 X10(3)/MCL (ref 4.5–11.5)

## 2024-10-20 PROCEDURE — 25000003 PHARM REV CODE 250: Performed by: STUDENT IN AN ORGANIZED HEALTH CARE EDUCATION/TRAINING PROGRAM

## 2024-10-20 PROCEDURE — 85018 HEMOGLOBIN: CPT

## 2024-10-20 PROCEDURE — A4216 STERILE WATER/SALINE, 10 ML: HCPCS | Performed by: STUDENT IN AN ORGANIZED HEALTH CARE EDUCATION/TRAINING PROGRAM

## 2024-10-20 PROCEDURE — 21400001 HC TELEMETRY ROOM

## 2024-10-20 PROCEDURE — 27000207 HC ISOLATION

## 2024-10-20 PROCEDURE — 25000003 PHARM REV CODE 250: Performed by: INTERNAL MEDICINE

## 2024-10-20 PROCEDURE — 80202 ASSAY OF VANCOMYCIN: CPT

## 2024-10-20 PROCEDURE — 85025 COMPLETE CBC W/AUTO DIFF WBC: CPT

## 2024-10-20 PROCEDURE — 83735 ASSAY OF MAGNESIUM: CPT

## 2024-10-20 PROCEDURE — 27000221 HC OXYGEN, UP TO 24 HOURS

## 2024-10-20 PROCEDURE — 11000001 HC ACUTE MED/SURG PRIVATE ROOM

## 2024-10-20 PROCEDURE — 80053 COMPREHEN METABOLIC PANEL: CPT

## 2024-10-20 PROCEDURE — 36415 COLL VENOUS BLD VENIPUNCTURE: CPT | Performed by: STUDENT IN AN ORGANIZED HEALTH CARE EDUCATION/TRAINING PROGRAM

## 2024-10-20 PROCEDURE — 84100 ASSAY OF PHOSPHORUS: CPT

## 2024-10-20 PROCEDURE — 36415 COLL VENOUS BLD VENIPUNCTURE: CPT

## 2024-10-20 PROCEDURE — 63600175 PHARM REV CODE 636 W HCPCS: Performed by: STUDENT IN AN ORGANIZED HEALTH CARE EDUCATION/TRAINING PROGRAM

## 2024-10-20 PROCEDURE — 85027 COMPLETE CBC AUTOMATED: CPT | Performed by: STUDENT IN AN ORGANIZED HEALTH CARE EDUCATION/TRAINING PROGRAM

## 2024-10-20 PROCEDURE — 63600175 PHARM REV CODE 636 W HCPCS: Performed by: INTERNAL MEDICINE

## 2024-10-20 RX ADMIN — GABAPENTIN 400 MG: 300 CAPSULE ORAL at 03:10

## 2024-10-20 RX ADMIN — SODIUM CHLORIDE, PRESERVATIVE FREE 10 ML: 5 INJECTION INTRAVENOUS at 12:10

## 2024-10-20 RX ADMIN — OXYBUTYNIN CHLORIDE 5 MG: 5 TABLET ORAL at 08:10

## 2024-10-20 RX ADMIN — CIPROFLOXACIN 400 MG: 2 INJECTION, SOLUTION INTRAVENOUS at 09:10

## 2024-10-20 RX ADMIN — SENNOSIDES AND DOCUSATE SODIUM 1 TABLET: 50; 8.6 TABLET ORAL at 08:10

## 2024-10-20 RX ADMIN — GABAPENTIN 400 MG: 300 CAPSULE ORAL at 05:10

## 2024-10-20 RX ADMIN — FLUOXETINE HYDROCHLORIDE 20 MG: 20 CAPSULE ORAL at 08:10

## 2024-10-20 RX ADMIN — OXYCODONE AND ACETAMINOPHEN 1 TABLET: 10; 325 TABLET ORAL at 08:10

## 2024-10-20 RX ADMIN — GABAPENTIN 400 MG: 300 CAPSULE ORAL at 09:10

## 2024-10-20 RX ADMIN — AMPICILLIN SODIUM AND SULBACTAM SODIUM 3 G: 2; 1 INJECTION, POWDER, FOR SOLUTION INTRAMUSCULAR; INTRAVENOUS at 02:10

## 2024-10-20 RX ADMIN — FENOFIBRATE 48 MG: 48 TABLET, FILM COATED ORAL at 08:10

## 2024-10-20 RX ADMIN — OXYBUTYNIN CHLORIDE 5 MG: 5 TABLET ORAL at 03:10

## 2024-10-20 RX ADMIN — SODIUM CHLORIDE, PRESERVATIVE FREE 10 ML: 5 INJECTION INTRAVENOUS at 05:10

## 2024-10-20 RX ADMIN — OXYCODONE AND ACETAMINOPHEN 1 TABLET: 10; 325 TABLET ORAL at 10:10

## 2024-10-20 RX ADMIN — ATORVASTATIN CALCIUM 20 MG: 10 TABLET, FILM COATED ORAL at 08:10

## 2024-10-20 RX ADMIN — AMPICILLIN SODIUM AND SULBACTAM SODIUM 3 G: 2; 1 INJECTION, POWDER, FOR SOLUTION INTRAMUSCULAR; INTRAVENOUS at 06:10

## 2024-10-20 RX ADMIN — CIPROFLOXACIN 400 MG: 2 INJECTION, SOLUTION INTRAVENOUS at 10:10

## 2024-10-20 RX ADMIN — AMPICILLIN SODIUM AND SULBACTAM SODIUM 3 G: 2; 1 INJECTION, POWDER, FOR SOLUTION INTRAMUSCULAR; INTRAVENOUS at 09:10

## 2024-10-20 RX ADMIN — OXYCODONE AND ACETAMINOPHEN 1 TABLET: 10; 325 TABLET ORAL at 05:10

## 2024-10-20 RX ADMIN — OXYCODONE AND ACETAMINOPHEN 1 TABLET: 10; 325 TABLET ORAL at 03:10

## 2024-10-20 RX ADMIN — PANTOPRAZOLE SODIUM 40 MG: 40 INJECTION, POWDER, LYOPHILIZED, FOR SOLUTION INTRAVENOUS at 08:10
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
## 2024-10-20 NOTE — PLAN OF CARE
Problem: Adult Inpatient Plan of Care  Goal: Plan of Care Review  Outcome: Progressing  Goal: Patient-Specific Goal (Individualized)  Outcome: Progressing  Goal: Absence of Hospital-Acquired Illness or Injury  Outcome: Progressing  Goal: Optimal Comfort and Wellbeing  Outcome: Progressing  Goal: Readiness for Transition of Care  Outcome: Progressing      Pt came in from home wanting to \"get clean.\" Pt admits to smoking crack today. States \"been trying to get help\". Denies ETOH use. VSS.

## 2024-10-21 LAB
ABO + RH BLD: NORMAL
ALBUMIN SERPL-MCNC: 1.8 G/DL (ref 3.4–4.8)
ALBUMIN/GLOB SERPL: 0.5 RATIO (ref 1.1–2)
ALLENS TEST BLOOD GAS (OHS): YES
ALP SERPL-CCNC: 109 UNIT/L (ref 40–150)
ALT SERPL-CCNC: 75 UNIT/L (ref 0–55)
ANION GAP SERPL CALC-SCNC: 10 MEQ/L
AST SERPL-CCNC: 65 UNIT/L (ref 5–34)
BASE EXCESS BLD CALC-SCNC: 10.1 MMOL/L (ref -2–2)
BASOPHILS # BLD AUTO: 0.02 X10(3)/MCL
BASOPHILS NFR BLD AUTO: 0.2 %
BILIRUB SERPL-MCNC: 0.7 MG/DL
BLD PROD TYP BPU: NORMAL
BLOOD GAS SAMPLE TYPE (OHS): ABNORMAL
BLOOD UNIT EXPIRATION DATE: NORMAL
BLOOD UNIT TYPE CODE: 5100
BLOOD UNIT TYPE CODE: 9500
BUN SERPL-MCNC: 34.8 MG/DL (ref 8.4–25.7)
CA-I BLD-SCNC: 1.11 MMOL/L (ref 1.12–1.23)
CALCIUM SERPL-MCNC: 7.9 MG/DL (ref 8.8–10)
CHLORIDE SERPL-SCNC: 99 MMOL/L (ref 98–107)
CO2 BLDA-SCNC: 36.4 MMOL/L
CO2 SERPL-SCNC: 29 MMOL/L (ref 23–31)
COHGB MFR BLDA: 1.4 % (ref 0.5–1.5)
CREAT SERPL-MCNC: 1.82 MG/DL (ref 0.72–1.25)
CREAT/UREA NIT SERPL: 19
CROSSMATCH INTERPRETATION: NORMAL
DISPENSE STATUS: NORMAL
DRAWN BY BLOOD GAS (OHS): ABNORMAL
EOSINOPHIL # BLD AUTO: 0.01 X10(3)/MCL (ref 0–0.9)
EOSINOPHIL NFR BLD AUTO: 0.1 %
ERYTHROCYTE [DISTWIDTH] IN BLOOD BY AUTOMATED COUNT: 15.5 % (ref 11.5–17)
GFR SERPLBLD CREATININE-BSD FMLA CKD-EPI: 42 ML/MIN/1.73/M2
GLOBULIN SER-MCNC: 4 GM/DL (ref 2.4–3.5)
GLUCOSE SERPL-MCNC: 88 MG/DL (ref 82–115)
HCO3 BLDA-SCNC: 34.9 MMOL/L (ref 22–26)
HCT VFR BLD AUTO: 26.6 % (ref 42–52)
HGB BLD-MCNC: 8.7 G/DL (ref 14–18)
IMM GRANULOCYTES # BLD AUTO: 0.13 X10(3)/MCL (ref 0–0.04)
IMM GRANULOCYTES NFR BLD AUTO: 1.2 %
INHALED O2 CONCENTRATION: 75 %
LPM (OHS): 35
LYMPHOCYTES # BLD AUTO: 1.15 X10(3)/MCL (ref 0.6–4.6)
LYMPHOCYTES NFR BLD AUTO: 10.5 %
MAGNESIUM SERPL-MCNC: 2.1 MG/DL (ref 1.6–2.6)
MCH RBC QN AUTO: 29.1 PG (ref 27–31)
MCHC RBC AUTO-ENTMCNC: 32.7 G/DL (ref 33–36)
MCV RBC AUTO: 89 FL (ref 80–94)
METHGB MFR BLDA: 0.7 % (ref 0.4–1.5)
MONOCYTES # BLD AUTO: 0.68 X10(3)/MCL (ref 0.1–1.3)
MONOCYTES NFR BLD AUTO: 6.2 %
NEUTROPHILS # BLD AUTO: 8.98 X10(3)/MCL (ref 2.1–9.2)
NEUTROPHILS NFR BLD AUTO: 81.8 %
NRBC BLD AUTO-RTO: 0.2 %
O2 HB BLOOD GAS (OHS): 96.6 % (ref 94–97)
OXYGEN DEVICE BLOOD GAS (OHS): ABNORMAL
OXYHGB MFR BLDA: 8.2 G/DL (ref 12–16)
PCO2 BLDA: 48 MMHG (ref 35–45)
PH BLDA: 7.47 [PH] (ref 7.35–7.45)
PHOSPHATE SERPL-MCNC: 2.9 MG/DL (ref 2.3–4.7)
PLATELET # BLD AUTO: 183 X10(3)/MCL (ref 130–400)
PMV BLD AUTO: 10 FL (ref 7.4–10.4)
PO2 BLDA: 129 MMHG (ref 80–100)
POCT GLUCOSE: 100 MG/DL (ref 70–110)
POCT GLUCOSE: 103 MG/DL (ref 70–110)
POCT GLUCOSE: 92 MG/DL (ref 70–110)
POTASSIUM BLOOD GAS (OHS): 3.5 MMOL/L (ref 3.5–5)
POTASSIUM SERPL-SCNC: 4.8 MMOL/L (ref 3.5–5.1)
PROT SERPL-MCNC: 5.8 GM/DL (ref 5.8–7.6)
RBC # BLD AUTO: 2.99 X10(6)/MCL (ref 4.7–6.1)
RBCS: NORMAL
RBCS: NORMAL
SAMPLE SITE BLOOD GAS (OHS): ABNORMAL
SAO2 % BLDA: 99.1 %
SODIUM BLOOD GAS (OHS): 133 MMOL/L (ref 137–145)
SODIUM SERPL-SCNC: 138 MMOL/L (ref 136–145)
UNIT NUMBER: NORMAL
WBC # BLD AUTO: 10.97 X10(3)/MCL (ref 4.5–11.5)

## 2024-10-21 PROCEDURE — 25000003 PHARM REV CODE 250: Performed by: INTERNAL MEDICINE

## 2024-10-21 PROCEDURE — 94640 AIRWAY INHALATION TREATMENT: CPT

## 2024-10-21 PROCEDURE — 94760 N-INVAS EAR/PLS OXIMETRY 1: CPT | Mod: XB

## 2024-10-21 PROCEDURE — 27000221 HC OXYGEN, UP TO 24 HOURS

## 2024-10-21 PROCEDURE — 94668 MNPJ CHEST WALL SBSQ: CPT

## 2024-10-21 PROCEDURE — 27000249 HC VAPOTHERM CIRCUIT

## 2024-10-21 PROCEDURE — 84100 ASSAY OF PHOSPHORUS: CPT

## 2024-10-21 PROCEDURE — 25000242 PHARM REV CODE 250 ALT 637 W/ HCPCS: Performed by: INTERNAL MEDICINE

## 2024-10-21 PROCEDURE — 36415 COLL VENOUS BLD VENIPUNCTURE: CPT

## 2024-10-21 PROCEDURE — 21400001 HC TELEMETRY ROOM

## 2024-10-21 PROCEDURE — 36600 WITHDRAWAL OF ARTERIAL BLOOD: CPT

## 2024-10-21 PROCEDURE — 27100092 HC HIGH FLOW DELIVERY CANNULA

## 2024-10-21 PROCEDURE — 80053 COMPREHEN METABOLIC PANEL: CPT

## 2024-10-21 PROCEDURE — 63600175 PHARM REV CODE 636 W HCPCS: Performed by: INTERNAL MEDICINE

## 2024-10-21 PROCEDURE — 94799 UNLISTED PULMONARY SVC/PX: CPT

## 2024-10-21 PROCEDURE — 83735 ASSAY OF MAGNESIUM: CPT

## 2024-10-21 PROCEDURE — 94667 MNPJ CHEST WALL 1ST: CPT

## 2024-10-21 PROCEDURE — 94761 N-INVAS EAR/PLS OXIMETRY MLT: CPT

## 2024-10-21 PROCEDURE — 27100171 HC OXYGEN HIGH FLOW UP TO 24 HOURS

## 2024-10-21 PROCEDURE — 85025 COMPLETE CBC W/AUTO DIFF WBC: CPT

## 2024-10-21 PROCEDURE — 25000003 PHARM REV CODE 250: Performed by: STUDENT IN AN ORGANIZED HEALTH CARE EDUCATION/TRAINING PROGRAM

## 2024-10-21 PROCEDURE — A4216 STERILE WATER/SALINE, 10 ML: HCPCS | Performed by: STUDENT IN AN ORGANIZED HEALTH CARE EDUCATION/TRAINING PROGRAM

## 2024-10-21 PROCEDURE — 99900031 HC PATIENT EDUCATION (STAT)

## 2024-10-21 PROCEDURE — 82803 BLOOD GASES ANY COMBINATION: CPT

## 2024-10-21 PROCEDURE — 25000003 PHARM REV CODE 250: Performed by: NURSE PRACTITIONER

## 2024-10-21 PROCEDURE — 63600175 PHARM REV CODE 636 W HCPCS: Performed by: STUDENT IN AN ORGANIZED HEALTH CARE EDUCATION/TRAINING PROGRAM

## 2024-10-21 PROCEDURE — 27000207 HC ISOLATION

## 2024-10-21 PROCEDURE — 99900026 HC AIRWAY MAINTENANCE (STAT)

## 2024-10-21 PROCEDURE — 99900035 HC TECH TIME PER 15 MIN (STAT)

## 2024-10-21 RX ORDER — DOXYCYCLINE HYCLATE 100 MG
100 TABLET ORAL EVERY 12 HOURS
Status: DISCONTINUED | OUTPATIENT
Start: 2024-10-21 | End: 2024-11-26 | Stop reason: HOSPADM

## 2024-10-21 RX ORDER — HYDRALAZINE HYDROCHLORIDE 20 MG/ML
10 INJECTION INTRAMUSCULAR; INTRAVENOUS EVERY 4 HOURS PRN
Status: DISCONTINUED | OUTPATIENT
Start: 2024-10-21 | End: 2024-10-24

## 2024-10-21 RX ORDER — SODIUM CHLORIDE FOR INHALATION 0.9 %
3 VIAL, NEBULIZER (ML) INHALATION 3 TIMES DAILY
Status: DISCONTINUED | OUTPATIENT
Start: 2024-10-21 | End: 2024-10-28

## 2024-10-21 RX ORDER — CARVEDILOL 12.5 MG/1
12.5 TABLET ORAL 2 TIMES DAILY
Status: DISCONTINUED | OUTPATIENT
Start: 2024-10-21 | End: 2024-10-22

## 2024-10-21 RX ORDER — GUAIFENESIN 100 MG/5ML
200 SOLUTION ORAL EVERY 4 HOURS PRN
Status: DISCONTINUED | OUTPATIENT
Start: 2024-10-21 | End: 2024-11-26 | Stop reason: HOSPADM

## 2024-10-21 RX ORDER — IPRATROPIUM BROMIDE AND ALBUTEROL SULFATE 2.5; .5 MG/3ML; MG/3ML
3 SOLUTION RESPIRATORY (INHALATION) EVERY 4 HOURS PRN
Status: DISCONTINUED | OUTPATIENT
Start: 2024-10-21 | End: 2024-11-26 | Stop reason: HOSPADM

## 2024-10-21 RX ADMIN — SODIUM CHLORIDE, PRESERVATIVE FREE 10 ML: 5 INJECTION INTRAVENOUS at 12:10

## 2024-10-21 RX ADMIN — CARVEDILOL 12.5 MG: 12.5 TABLET, FILM COATED ORAL at 08:10

## 2024-10-21 RX ADMIN — MORPHINE SULFATE 2 MG: 4 INJECTION, SOLUTION INTRAMUSCULAR; INTRAVENOUS at 09:10

## 2024-10-21 RX ADMIN — FLUOXETINE HYDROCHLORIDE 20 MG: 20 CAPSULE ORAL at 08:10

## 2024-10-21 RX ADMIN — METHOCARBAMOL 750 MG: 750 TABLET ORAL at 03:10

## 2024-10-21 RX ADMIN — TRAZODONE HYDROCHLORIDE 200 MG: 100 TABLET ORAL at 08:10

## 2024-10-21 RX ADMIN — MUPIROCIN: 20 OINTMENT TOPICAL at 08:10

## 2024-10-21 RX ADMIN — GABAPENTIN 400 MG: 300 CAPSULE ORAL at 02:10

## 2024-10-21 RX ADMIN — OXYCODONE AND ACETAMINOPHEN 1 TABLET: 10; 325 TABLET ORAL at 08:10

## 2024-10-21 RX ADMIN — SODIUM CHLORIDE, PRESERVATIVE FREE 10 ML: 5 INJECTION INTRAVENOUS at 06:10

## 2024-10-21 RX ADMIN — DOXYCYCLINE HYCLATE 100 MG: 100 TABLET, COATED ORAL at 08:10

## 2024-10-21 RX ADMIN — PANTOPRAZOLE SODIUM 40 MG: 40 INJECTION, POWDER, LYOPHILIZED, FOR SOLUTION INTRAVENOUS at 08:10

## 2024-10-21 RX ADMIN — OXYCODONE AND ACETAMINOPHEN 1 TABLET: 10; 325 TABLET ORAL at 03:10

## 2024-10-21 RX ADMIN — CIPROFLOXACIN 400 MG: 2 INJECTION, SOLUTION INTRAVENOUS at 10:10

## 2024-10-21 RX ADMIN — ISODIUM CHLORIDE 3 ML: 0.03 SOLUTION RESPIRATORY (INHALATION) at 08:10

## 2024-10-21 RX ADMIN — HYDRALAZINE HYDROCHLORIDE 10 MG: 20 INJECTION INTRAMUSCULAR; INTRAVENOUS at 09:10

## 2024-10-21 RX ADMIN — METHOCARBAMOL 750 MG: 750 TABLET ORAL at 02:10

## 2024-10-21 RX ADMIN — OXYCODONE AND ACETAMINOPHEN 1 TABLET: 10; 325 TABLET ORAL at 01:10

## 2024-10-21 RX ADMIN — HYDRALAZINE HYDROCHLORIDE 10 MG: 20 INJECTION INTRAMUSCULAR; INTRAVENOUS at 10:10

## 2024-10-21 RX ADMIN — AMPICILLIN SODIUM AND SULBACTAM SODIUM 3 G: 2; 1 INJECTION, POWDER, FOR SOLUTION INTRAMUSCULAR; INTRAVENOUS at 02:10

## 2024-10-21 RX ADMIN — METHOCARBAMOL 750 MG: 750 TABLET ORAL at 08:10

## 2024-10-21 RX ADMIN — ATORVASTATIN CALCIUM 20 MG: 10 TABLET, FILM COATED ORAL at 08:10

## 2024-10-21 RX ADMIN — IPRATROPIUM BROMIDE AND ALBUTEROL SULFATE 3 ML: 2.5; .5 SOLUTION RESPIRATORY (INHALATION) at 08:10

## 2024-10-21 RX ADMIN — AMPICILLIN SODIUM AND SULBACTAM SODIUM 3 G: 2; 1 INJECTION, POWDER, FOR SOLUTION INTRAMUSCULAR; INTRAVENOUS at 06:10

## 2024-10-21 RX ADMIN — GABAPENTIN 400 MG: 300 CAPSULE ORAL at 10:10

## 2024-10-21 RX ADMIN — IPRATROPIUM BROMIDE AND ALBUTEROL SULFATE 3 ML: 2.5; .5 SOLUTION RESPIRATORY (INHALATION) at 11:10

## 2024-10-21 RX ADMIN — SENNOSIDES AND DOCUSATE SODIUM 1 TABLET: 50; 8.6 TABLET ORAL at 08:10

## 2024-10-21 RX ADMIN — OXYBUTYNIN CHLORIDE 5 MG: 5 TABLET ORAL at 08:10

## 2024-10-21 RX ADMIN — FENOFIBRATE 48 MG: 48 TABLET, FILM COATED ORAL at 08:10

## 2024-10-21 RX ADMIN — OXYBUTYNIN CHLORIDE 5 MG: 5 TABLET ORAL at 02:10

## 2024-10-21 RX ADMIN — AMPICILLIN SODIUM AND SULBACTAM SODIUM 3 G: 2; 1 INJECTION, POWDER, FOR SOLUTION INTRAMUSCULAR; INTRAVENOUS at 09:10

## 2024-10-21 RX ADMIN — IPRATROPIUM BROMIDE AND ALBUTEROL SULFATE 3 ML: 2.5; .5 SOLUTION RESPIRATORY (INHALATION) at 04:10
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
## 2024-10-21 NOTE — CODE/ RAPID DOCUMENTATION
Was asked from wound care to assess pt's due to respiratory distress. Upon arrival pt is on a %, complaining of significant shortness of breath,  CXR shows large amount of atelectasis rt greater then left. Notified Dr. Kirk and called respiratory to bring a vapotherm for pt. Awaiting further instructions from MD.     New orders per Md, duoneb treatments and cpt per respiratory. Spoke with RT placed on vapotherm 35 L at 70%  and notified her of new orders for neb tx and cpt.   Spo2-100% and RR-28 at this time.

## 2024-10-21 NOTE — CARE UPDATE
Called to room for pt sat of 85% on 15lpm oxymask. Placed on nonrebreather mask. 02 sat increased to 89%. Cough and deep breathing attempted but pt has a very weak congested cough.  Attempted oral suction. Pt not strong enough to cough up secreations.  Nt sx small amount thick pale secretions.  Pt 02 sat increased to 96%. Nurse in room. Doctor called per nurse.

## 2024-10-21 NOTE — PHYSICIAN QUERY
Please clarify if there is any clinical correlation between hematoma/hemorrhage and lovenox. Are the conditions:   Due to or associated with each other

## 2024-10-21 NOTE — PHYSICIAN QUERY
Please clarify the nutritional diagnosis associated with the clinical findings:  Moderate protein calorie malnutrition

## 2024-10-21 NOTE — PROGRESS NOTES
[facility] - 8th Floor Med Surg  Wound Care    Patient Name:  Bianca Khan   MRN:  [id number]  Date: 10/21/2024  Diagnosis: Sacral wound    History:     Past Medical History:   Diagnosis Date    Arthritis     Chronic ulcer of ankle 2022    ESBL (extended spectrum beta-lactamase) producing bacteria infection 2024    Frequent UTI 2019    Generalized anxiety disorder 2022    Neurogenic bladder 2022    Osteomyelitis 2022    Paraplegia     Presence of suprapubic catheter 2022    Pure hypercholesterolemia 2022    Retention of urine, unspecified 2019    Spinal cord injury at T1-T6 level 2018       Social History     Socioeconomic History    Marital status:    Tobacco Use    Smoking status: Some Days     Current packs/day: 0.00     Average packs/day: 0.2 packs/day for 41.5 years (10.4 ttl pk-yrs)     Types: Cigars, Cigarettes     Start date: 1982     Last attempt to quit: 2023     Years since quittin.2    Smokeless tobacco: Never   Substance and Sexual Activity    Alcohol use: Not Currently     Alcohol/week: 2.0 standard drinks of alcohol     Types: 2 Cans of beer per week    Drug use: Not Currently     Types: Oxycodone    Sexual activity: Not Currently     Partners: Female     Birth control/protection: None     Social Drivers of Health     Financial Resource Strain: Low Risk  (10/10/2024)    Overall Financial Resource Strain (CARDIA)     Difficulty of Paying Living Expenses: Not hard at all   Food Insecurity: No Food Insecurity (10/10/2024)    Hunger Vital Sign     Worried About Running Out of Food in the Last Year: Never true     Ran Out of Food in the Last Year: Never true   Transportation Needs: No Transportation Needs (10/10/2024)    TRANSPORTATION NEEDS     Transportation : No   Physical Activity: Inactive (2023)    Exercise Vital Sign     Days of Exercise per Week: 0 days     Minutes of Exercise per Session: 0 min    Stress: No Stress Concern Present (10/10/2024)    [employer] - Occupational Stress Questionnaire     Feeling of Stress : Only a little   Housing Stability: Low Risk  (10/10/2024)    Housing Stability Vital Sign     Unable to Pay for Housing in the Last Year: No     Homeless in the Last Year: No       Precautions:     Allergies as of 10/08/2024 - Reviewed 10/08/2024   Allergen Reaction Noted    Baclofen Itching and Anxiety 06/17/2019       WO Assessment Details/Treatment        10/21/24 1019   WOCN Assessment   Visit Date 10/21/24   Visit Time 1019   Consult Type Follow Up   WOCN Speciality Wound   WOCN List wound vac   Wound pressure;surgical   Procedure wound vac   Intervention chart review;assessed;applied;orders   Teaching on-going        Wound 05/30/24 0000 Pressure Injury Right Buttocks   Date First Assessed/Time First Assessed: 05/30/24 0000   Primary Wound Type: Pressure Injury  Side: Right  Location: Buttocks  Is this injury device related?: No   Wound Image    Pressure Injury Stage U   Dressing Appearance Intact;Moist drainage   Drainage Amount Small   Drainage Characteristics/Odor Serosanguineous   Appearance Pink;Red;Yellow   Tissue loss description Full thickness   Black (%), Wound Tissue Color 0 %   Red (%), Wound Tissue Color 80 %   Yellow (%), Wound Tissue Color 20 %   Periwound Area Intact;Dry   Wound Edges Defined   Wound Length (cm) 5 cm   Wound Width (cm) 5 cm   Wound Depth (cm) 2 cm   Wound Volume (cm^3) 50 cm^3   Wound Surface Area (cm^2) 25 cm^2   Care Cleansed with:;Antimicrobial agent  (vashe)   Dressing Removed;Applied  (NPWT; applied vashe wet to dry, pt. not tolerating being turned to the side)        Negative Pressure Wound Therapy  10/10/24 1017   Placement Date/Time: 10/10/24 1017   Location: Buttocks  Additional Comments: SN: [id number]   NPWT Type Vacuum Therapy   Therapy Setting NPWT Vacuum off   Sponges Removed NPWT 1;White;Black     WOCN follow up  for wound vac change to right buttock. Discussed POC w/ nurse Garibay. Pt. Hooked up to vital signs machine. Family at bedside. Explained reason for visit. Removed NPWT, 1 white foam, 1 black foam tracked to a long black foam. Pt. Did not tolerate being turned to his side, sats started to drop per vital signs machine, pt. Placed back to his back and vashe wet to dry dressing placed to right buttock due to unable to stay turned for long periods of time. Will follow up with pt. Tomorrow morning and attempt to reapply wound vac to right buttock if applicable. Notified pt.'s nurse and charge nurse on assessment findings.     10/21/2024

## 2024-10-22 LAB
ALBUMIN SERPL-MCNC: 1.8 G/DL (ref 3.4–4.8)
ALBUMIN/GLOB SERPL: 0.5 RATIO (ref 1.1–2)
ALP SERPL-CCNC: 90 UNIT/L (ref 40–150)
ALT SERPL-CCNC: 53 UNIT/L (ref 0–55)
ANION GAP SERPL CALC-SCNC: 8 MEQ/L
AST SERPL-CCNC: 35 UNIT/L (ref 5–34)
BACTERIA BLD CULT: NORMAL
BACTERIA BLD CULT: NORMAL
BASOPHILS # BLD AUTO: 0.02 X10(3)/MCL
BASOPHILS NFR BLD AUTO: 0.2 %
BILIRUB SERPL-MCNC: 0.8 MG/DL
BUN SERPL-MCNC: 28.8 MG/DL (ref 8.4–25.7)
CALCIUM SERPL-MCNC: 7.8 MG/DL (ref 8.8–10)
CHLORIDE SERPL-SCNC: 100 MMOL/L (ref 98–107)
CO2 SERPL-SCNC: 29 MMOL/L (ref 23–31)
CREAT SERPL-MCNC: 1.54 MG/DL (ref 0.72–1.25)
CREAT/UREA NIT SERPL: 19
EOSINOPHIL # BLD AUTO: 0.01 X10(3)/MCL (ref 0–0.9)
EOSINOPHIL NFR BLD AUTO: 0.1 %
ERYTHROCYTE [DISTWIDTH] IN BLOOD BY AUTOMATED COUNT: 15.2 % (ref 11.5–17)
GFR SERPLBLD CREATININE-BSD FMLA CKD-EPI: 51 ML/MIN/1.73/M2
GLOBULIN SER-MCNC: 3.6 GM/DL (ref 2.4–3.5)
GLUCOSE SERPL-MCNC: 85 MG/DL (ref 82–115)
HCT VFR BLD AUTO: 21.6 % (ref 42–52)
HGB BLD-MCNC: 7 G/DL (ref 14–18)
IMM GRANULOCYTES # BLD AUTO: 0.1 X10(3)/MCL (ref 0–0.04)
IMM GRANULOCYTES NFR BLD AUTO: 0.9 %
LYMPHOCYTES # BLD AUTO: 1.48 X10(3)/MCL (ref 0.6–4.6)
LYMPHOCYTES NFR BLD AUTO: 12.8 %
MAGNESIUM SERPL-MCNC: 2 MG/DL (ref 1.6–2.6)
MCH RBC QN AUTO: 28.2 PG (ref 27–31)
MCHC RBC AUTO-ENTMCNC: 32.4 G/DL (ref 33–36)
MCV RBC AUTO: 87.1 FL (ref 80–94)
MONOCYTES # BLD AUTO: 1 X10(3)/MCL (ref 0.1–1.3)
MONOCYTES NFR BLD AUTO: 8.7 %
NEUTROPHILS # BLD AUTO: 8.95 X10(3)/MCL (ref 2.1–9.2)
NEUTROPHILS NFR BLD AUTO: 77.3 %
NRBC BLD AUTO-RTO: 0.3 %
PHOSPHATE SERPL-MCNC: 1.9 MG/DL (ref 2.3–4.7)
PLATELET # BLD AUTO: 223 X10(3)/MCL (ref 130–400)
PMV BLD AUTO: 9.6 FL (ref 7.4–10.4)
POCT GLUCOSE: 101 MG/DL (ref 70–110)
POCT GLUCOSE: 103 MG/DL (ref 70–110)
POCT GLUCOSE: 82 MG/DL (ref 70–110)
POCT GLUCOSE: 92 MG/DL (ref 70–110)
POTASSIUM SERPL-SCNC: 3.7 MMOL/L (ref 3.5–5.1)
PROT SERPL-MCNC: 5.4 GM/DL (ref 5.8–7.6)
RBC # BLD AUTO: 2.48 X10(6)/MCL (ref 4.7–6.1)
SODIUM SERPL-SCNC: 137 MMOL/L (ref 136–145)
WBC # BLD AUTO: 11.56 X10(3)/MCL (ref 4.5–11.5)

## 2024-10-22 PROCEDURE — 99900031 HC PATIENT EDUCATION (STAT)

## 2024-10-22 PROCEDURE — 84100 ASSAY OF PHOSPHORUS: CPT

## 2024-10-22 PROCEDURE — 25000003 PHARM REV CODE 250: Performed by: INTERNAL MEDICINE

## 2024-10-22 PROCEDURE — 94640 AIRWAY INHALATION TREATMENT: CPT

## 2024-10-22 PROCEDURE — 85025 COMPLETE CBC W/AUTO DIFF WBC: CPT

## 2024-10-22 PROCEDURE — 83735 ASSAY OF MAGNESIUM: CPT

## 2024-10-22 PROCEDURE — 25000003 PHARM REV CODE 250: Performed by: NURSE PRACTITIONER

## 2024-10-22 PROCEDURE — 25000003 PHARM REV CODE 250: Performed by: STUDENT IN AN ORGANIZED HEALTH CARE EDUCATION/TRAINING PROGRAM

## 2024-10-22 PROCEDURE — 80053 COMPREHEN METABOLIC PANEL: CPT

## 2024-10-22 PROCEDURE — 63600175 PHARM REV CODE 636 W HCPCS: Performed by: INTERNAL MEDICINE

## 2024-10-22 PROCEDURE — 27000207 HC ISOLATION

## 2024-10-22 PROCEDURE — 94799 UNLISTED PULMONARY SVC/PX: CPT

## 2024-10-22 PROCEDURE — 99900035 HC TECH TIME PER 15 MIN (STAT)

## 2024-10-22 PROCEDURE — 94761 N-INVAS EAR/PLS OXIMETRY MLT: CPT

## 2024-10-22 PROCEDURE — 63600175 PHARM REV CODE 636 W HCPCS: Performed by: STUDENT IN AN ORGANIZED HEALTH CARE EDUCATION/TRAINING PROGRAM

## 2024-10-22 PROCEDURE — 36415 COLL VENOUS BLD VENIPUNCTURE: CPT

## 2024-10-22 PROCEDURE — 25000242 PHARM REV CODE 250 ALT 637 W/ HCPCS: Performed by: INTERNAL MEDICINE

## 2024-10-22 PROCEDURE — A4216 STERILE WATER/SALINE, 10 ML: HCPCS | Performed by: STUDENT IN AN ORGANIZED HEALTH CARE EDUCATION/TRAINING PROGRAM

## 2024-10-22 PROCEDURE — 21400001 HC TELEMETRY ROOM

## 2024-10-22 PROCEDURE — 94760 N-INVAS EAR/PLS OXIMETRY 1: CPT

## 2024-10-22 PROCEDURE — 27100171 HC OXYGEN HIGH FLOW UP TO 24 HOURS

## 2024-10-22 RX ORDER — CARVEDILOL 12.5 MG/1
25 TABLET ORAL 2 TIMES DAILY
Status: DISCONTINUED | OUTPATIENT
Start: 2024-10-22 | End: 2024-11-10

## 2024-10-22 RX ADMIN — OXYBUTYNIN CHLORIDE 5 MG: 5 TABLET ORAL at 09:10

## 2024-10-22 RX ADMIN — ISODIUM CHLORIDE 3 ML: 0.03 SOLUTION RESPIRATORY (INHALATION) at 09:10

## 2024-10-22 RX ADMIN — OXYCODONE AND ACETAMINOPHEN 1 TABLET: 10; 325 TABLET ORAL at 09:10

## 2024-10-22 RX ADMIN — OXYBUTYNIN CHLORIDE 5 MG: 5 TABLET ORAL at 03:10

## 2024-10-22 RX ADMIN — METHOCARBAMOL 750 MG: 750 TABLET ORAL at 05:10

## 2024-10-22 RX ADMIN — FLUOXETINE HYDROCHLORIDE 20 MG: 20 CAPSULE ORAL at 09:10

## 2024-10-22 RX ADMIN — OXYCODONE AND ACETAMINOPHEN 1 TABLET: 10; 325 TABLET ORAL at 04:10

## 2024-10-22 RX ADMIN — SENNOSIDES AND DOCUSATE SODIUM 1 TABLET: 50; 8.6 TABLET ORAL at 09:10

## 2024-10-22 RX ADMIN — SODIUM CHLORIDE, PRESERVATIVE FREE 10 ML: 5 INJECTION INTRAVENOUS at 12:10

## 2024-10-22 RX ADMIN — FENOFIBRATE 48 MG: 48 TABLET, FILM COATED ORAL at 09:10

## 2024-10-22 RX ADMIN — CIPROFLOXACIN 400 MG: 2 INJECTION, SOLUTION INTRAVENOUS at 09:10

## 2024-10-22 RX ADMIN — ATORVASTATIN CALCIUM 20 MG: 10 TABLET, FILM COATED ORAL at 09:10

## 2024-10-22 RX ADMIN — MUPIROCIN: 20 OINTMENT TOPICAL at 09:10

## 2024-10-22 RX ADMIN — CARVEDILOL 12.5 MG: 12.5 TABLET, FILM COATED ORAL at 09:10

## 2024-10-22 RX ADMIN — AMPICILLIN SODIUM AND SULBACTAM SODIUM 3 G: 2; 1 INJECTION, POWDER, FOR SOLUTION INTRAMUSCULAR; INTRAVENOUS at 05:10

## 2024-10-22 RX ADMIN — CARVEDILOL 25 MG: 12.5 TABLET, FILM COATED ORAL at 09:10

## 2024-10-22 RX ADMIN — GABAPENTIN 400 MG: 300 CAPSULE ORAL at 09:10

## 2024-10-22 RX ADMIN — GABAPENTIN 400 MG: 300 CAPSULE ORAL at 03:10

## 2024-10-22 RX ADMIN — AMPICILLIN SODIUM AND SULBACTAM SODIUM 3 G: 2; 1 INJECTION, POWDER, FOR SOLUTION INTRAMUSCULAR; INTRAVENOUS at 09:10

## 2024-10-22 RX ADMIN — AMPICILLIN SODIUM AND SULBACTAM SODIUM 3 G: 2; 1 INJECTION, POWDER, FOR SOLUTION INTRAMUSCULAR; INTRAVENOUS at 03:10

## 2024-10-22 RX ADMIN — HYDRALAZINE HYDROCHLORIDE 10 MG: 20 INJECTION INTRAMUSCULAR; INTRAVENOUS at 04:10

## 2024-10-22 RX ADMIN — CIPROFLOXACIN 400 MG: 2 INJECTION, SOLUTION INTRAVENOUS at 11:10

## 2024-10-22 RX ADMIN — DOXYCYCLINE HYCLATE 100 MG: 100 TABLET, COATED ORAL at 09:10

## 2024-10-22 RX ADMIN — ISODIUM CHLORIDE 3 ML: 0.03 SOLUTION RESPIRATORY (INHALATION) at 01:10

## 2024-10-22 RX ADMIN — OXYCODONE AND ACETAMINOPHEN 1 TABLET: 10; 325 TABLET ORAL at 03:10

## 2024-10-22 RX ADMIN — PANTOPRAZOLE SODIUM 40 MG: 40 INJECTION, POWDER, LYOPHILIZED, FOR SOLUTION INTRAVENOUS at 09:10

## 2024-10-22 RX ADMIN — SODIUM CHLORIDE, PRESERVATIVE FREE 10 ML: 5 INJECTION INTRAVENOUS at 05:10

## 2024-10-22 RX ADMIN — ISODIUM CHLORIDE 3 ML: 0.03 SOLUTION RESPIRATORY (INHALATION) at 08:10

## 2024-10-22 RX ADMIN — GABAPENTIN 400 MG: 300 CAPSULE ORAL at 05:10

## 2024-10-22 RX ADMIN — TRAZODONE HYDROCHLORIDE 200 MG: 100 TABLET ORAL at 09:10

## 2024-10-22 RX ADMIN — MORPHINE SULFATE 2 MG: 4 INJECTION, SOLUTION INTRAMUSCULAR; INTRAVENOUS at 12:10

## 2024-10-22 NOTE — PROGRESS NOTES
Infectious Diseases Progress Note  58 y/o male with past medical history of T-spine cord injury with paraplegia, diabetes, HTN, hepatitis-C, chronic MRSA L ankle wound/osteomyelitis, was on suppressive doxycycline and R knee infection/septic arthritis and osteomyelitis with GBS/Enterococcus and Ecoli isolates who presented to ER on 10/8 with increased generalized pain, increased foul smelling drainage from sacral wound with fever and chills. He was noted to be hypotensive per EMS. On admit he was afebrile without leukocytosis. ESR 98 and .80. MRSA PCR (-). U/A with 21-50 WBC, 0-5 RBC, 75 LE, no bacteria, negative nitrites. Urine culture negative. Blood cultures negative. Sacral wound culture with many Acinetobacter, many ESBL Ecoli, moderate Enterococcus, Bacteroides and Peptoniphilus. CT pelvis with contrast showed worsening inflammatory change of the sacral decubitus ulcers with gas now identified inferior to the right pubic ramus, stool noted throughout the colon as may be seen with constipation, pyelonephritis is not excluded. Seen by Surgery and underwent debridement of sacral wound. During his stay he was noted to have large amounts of bleeding from sacral wound. CT abdomen/pelvis on 10/17 showed interval development of L renal rupture with large subcapsular hematoma, L retroperitoneal hemorrhage. He received multiple blood/blood products over the past couple days. He was noted to be hypotensive and was transferred to ICU on 10/17. He was seen by Vascular and underwent Aortogram,  left renal angiography with coil embolization of left renal artery and right groin central venous line insertion on 10/17.   He is currently on Unasyn and Cipro. Remains on chronic oral suppression with Doxycycline    Subjective:  In ICU, intubated and on vent. No acute distress noted. No new complaints reported. Afebrile. Sister present    Review of Systems   Constitutional:  Positive for malaise/fatigue.   HENT: Negative.      Eyes: Negative.    Respiratory:  Positive for shortness of breath.    Cardiovascular: Negative.    Gastrointestinal: Negative.    Musculoskeletal: Negative.    Skin: Negative.    Neurological:  Positive for focal weakness and weakness.   All other systems reviewed and are negative.      Review of patient's allergies indicates:   Allergen Reactions    Baclofen Itching and Anxiety       Past Medical History:   Diagnosis Date    Arthritis     Chronic ulcer of ankle 05/26/2022    ESBL (extended spectrum beta-lactamase) producing bacteria infection 08/30/2024    Frequent UTI 07/02/2019    Generalized anxiety disorder 05/26/2022    Neurogenic bladder 05/26/2022    Osteomyelitis 05/26/2022    Paraplegia     Presence of suprapubic catheter 05/26/2022    Pure hypercholesterolemia 05/26/2022    Retention of urine, unspecified 08/09/2019    Spinal cord injury at T1-T6 level 04/20/2018       Past Surgical History:   Procedure Laterality Date    ANGIOGRAM, RENAL ARTERIES, BILATERAL N/A 10/17/2024    Procedure: Angiogram, Renal Arteries, Bilateral;  Surgeon: Pati King MD;  Location: [facility] CATH LAB;  Service: Peripheral Vascular;  Laterality: N/A;  left renal artery coiling    APPLICATION OF WOUND VACUUM-ASSISTED CLOSURE DEVICE N/A 10/10/2024    Procedure: APPLICATION, WOUND VAC;  Surgeon: Rob Sinclair MD;  Location: [facility];  Service: General;  Laterality: N/A;    EGD, WITH CLOSED BIOPSY N/A 8/29/2024    Procedure: EGD, WITH CLOSED BIOPSY;  Surgeon: Mikal Segovia MD;  Location: [facility] ENDOSCOPY;  Service: Gastroenterology;  Laterality: N/A;    ESOPHAGOGASTRODUODENOSCOPY N/A 8/29/2024    Procedure: EGD;  Surgeon: Mikal Segovia MD;  Location: [facility] ENDOSCOPY;  Service: Gastroenterology;  Laterality: N/A;    FRACTURE SURGERY  2021    INCISION AND DRAINAGE, LOWER EXTREMITY Right 06/29/2023    Procedure: INCISION AND DRAINAGE, LOWER EXTREMITY;  Surgeon: Prabhu Shen DO;  Location: [facility];  Service:  Orthopedics;  Laterality: Right;  supine bone foam wash stuff cultures    INCISION AND DRAINAGE, LOWER EXTREMITY Right 2023    Procedure: INCISION AND DRAINAGE, LOWER EXTREMITY;  Surgeon: Prabhu Shen DO;  Location: [facility] OR;  Service: Orthopedics;  Laterality: Right;  supine any table bone foam wash stuff possible wound vac    INCISION AND DRAINAGE, LOWER EXTREMITY Right 2023    Procedure: INCISION AND DRAINAGE, LOWER EXTREMITY;  Surgeon: Prabhu Shen DO;  Location: [facility] OR;  Service: Orthopedics;  Laterality: Right;  supine bone foam vascular bed removal of distal femoral plate, wash stuff, possible AKA    INSERTION OF INTRAMEDULLARY MARISA Right 08/10/2022    Procedure: INSERTION, INTRAMEDULLARY MARISA RIGHT TIBIA;  Surgeon: Jorge Orellana MD;  Location: [facility] OR;  Service: Orthopedics;  Laterality: Right;    JOINT REPLACEMENT      Ankel    PRESSURE ULCER DEBRIDEMENT N/A 10/10/2024    Procedure: DEBRIDEMENT, PRESSURE ULCER;  Surgeon: Rob Sinclair MD;  Location: [facility];  Service: General;  Laterality: N/A;  sacral wound, R buttock wound    REMOVAL OF HARDWARE FROM LOWER EXTREMITY Right 2023    Procedure: REMOVAL, HARDWARE, LOWER EXTREMITY;  Surgeon: Prabhu Shen DO;  Location: [facility] OR;  Service: Orthopedics;  Laterality: Right;    SPINE SURGERY  2018       Social History     Socioeconomic History    Marital status:    Tobacco Use    Smoking status: Some Days     Current packs/day: 0.00     Average packs/day: 0.2 packs/day for 41.5 years (10.4 ttl pk-yrs)     Types: Cigars, Cigarettes     Start date: 1982     Last attempt to quit: 2023     Years since quittin.2    Smokeless tobacco: Never   Substance and Sexual Activity    Alcohol use: Not Currently     Alcohol/week: 2.0 standard drinks of alcohol     Types: 2 Cans of beer per week    Drug use: Not Currently     Types: Oxycodone    Sexual activity: Not Currently     Partners: Female     Birth control/protection:  None     Social Drivers of Health     Financial Resource Strain: Low Risk  (10/10/2024)    Overall Financial Resource Strain (CARDIA)     Difficulty of Paying Living Expenses: Not hard at all   Food Insecurity: No Food Insecurity (10/10/2024)    Hunger Vital Sign     Worried About Running Out of Food in the Last Year: Never true     Ran Out of Food in the Last Year: Never true   Transportation Needs: No Transportation Needs (10/10/2024)    TRANSPORTATION NEEDS     Transportation : No   Physical Activity: Inactive (12/11/2023)    Exercise Vital Sign     Days of Exercise per Week: 0 days     Minutes of Exercise per Session: 0 min   Stress: No Stress Concern Present (10/10/2024)    [employer] [place] [employer] - Occupational Stress Questionnaire     Feeling of Stress : Only a little   Housing Stability: Low Risk  (10/10/2024)    Housing Stability Vital Sign     Unable to Pay for Housing in the Last Year: No     Homeless in the Last Year: No         Scheduled Meds:   ampicillin-sulbactam  3 g Intravenous Q8H    atorvastatin  20 mg Per NG tube Nightly    calcium gluconate 1 g  1 g Intravenous Once    carvediloL  12.5 mg Oral BID    ciprofloxacin  400 mg Intravenous Q12H    fenofibrate  48 mg Per NG tube Daily    FLUoxetine  20 mg Per G Tube Daily    gabapentin  400 mg Per NG tube Q8H    mupirocin   Nasal BID    oxybutynin  5 mg Per NG tube TID    pantoprazole  40 mg Intravenous Daily    senna-docusate 8.6-50 mg  1 tablet Oral BID    sodium chloride 0.9%  10 mL Intravenous Q6H    sodium chloride 0.9%  3 mL Nebulization TID     Continuous Infusions:      PRN Meds:  Current Facility-Administered Medications:     0.9%  NaCl infusion (for blood administration), , Intravenous, Q24H PRN    0.9%  NaCl infusion (for blood administration), , Intravenous, Q24H PRN    0.9%  NaCl infusion (for blood administration), , Intravenous, Q24H PRN    0.9%  NaCl infusion (for blood administration), , Intravenous, Q24H PRN    0.9%   "NaCl infusion (for blood administration), , Intravenous, Q24H PRN    0.9%  NaCl infusion (for blood administration), , Intravenous, Q24H PRN    0.9%  NaCl infusion (for blood administration), , Intravenous, Q24H PRN    0.9%  NaCl infusion (for blood administration), , Intravenous, Q24H PRN    acetaminophen, 650 mg, Oral, Q4H PRN    albuterol-ipratropium, 3 mL, Nebulization, Q4H PRN    aluminum-magnesium hydroxide-simethicone, 30 mL, Oral, QID PRN    dextrose 10%, 12.5 g, Intravenous, PRN    dextrose 10%, 25 g, Intravenous, PRN    diphenhydrAMINE, 50 mg, Intravenous, Q6H PRN    etomidate, , Intravenous, Code/trauma/sedation Med    glucagon (human recombinant), 1 mg, Intramuscular, PRN    glucose, 16 g, Oral, PRN    glucose, 24 g, Oral, PRN    guaiFENesin 100 mg/5 ml, 200 mg, Oral, Q4H PRN    hydrALAZINE, 10 mg, Intravenous, Q4H PRN    hyoscyamine, 0.125 mg, Sublingual, Q4H PRN    insulin aspart U-100, 1-10 Units, Subcutaneous, QID (AC + HS) PRN    melatonin, 6 mg, Oral, Nightly PRN    methocarbamoL, 750 mg, Oral, TID PRN    morphine, 2 mg, Intravenous, BID PRN    ondansetron, 4 mg, Intravenous, Q4H PRN    oxyCODONE-acetaminophen, 1 tablet, Oral, Q4H PRN    polyethylene glycol, 17 g, Oral, BID PRN    prochlorperazine, 5 mg, Intravenous, Q6H PRN    rocuronium, , Intravenous, Code/trauma/sedation Med    sodium chloride 0.9%, 10 mL, Intravenous, PRN    Flushing PICC/Midline Protocol, , , Until Discontinued **AND** sodium chloride 0.9%, 10 mL, Intravenous, Q6H **AND** sodium chloride 0.9%, 10 mL, Intravenous, PRN    traZODone, 200 mg, Oral, Nightly PRN    Objective:  BP (!) 159/88   Pulse 104   Temp 97.5 °F (36.4 °C) (Oral)   Resp 18   Ht 5' 10" (1.778 m)   Wt 79.3 kg (174 lb 13.2 oz)   SpO2 100%   BMI 25.08 kg/m²     Physical Exam:   Physical Exam  Vitals reviewed.   HENT:      Head: Normocephalic.   Cardiovascular:      Rate and Rhythm: Normal rate and regular rhythm.   Pulmonary:      Effort: Pulmonary effort is " normal. No respiratory distress.      Breath sounds: Normal breath sounds. No wheezing.   Abdominal:      General: Bowel sounds are normal. There is no distension.      Palpations: Abdomen is soft.      Tenderness: There is no abdominal tenderness.   Genitourinary:     Comments: Suprapubic catheter  Musculoskeletal:      Cervical back: Normal range of motion.      Comments: Paraplegia   Skin:     Findings: No rash.      Comments: Wounds dressed   Neurological:      Mental Status: He is alert and oriented to person, place, and time.   Psychiatric:         Behavior: Behavior normal.     Imaging    Lab Review   Recent Results (from the past 24 hours)   POCT glucose    Collection Time: 10/20/24  9:47 PM   Result Value Ref Range    POCT Glucose 100 70 - 110 mg/dL   Comprehensive Metabolic Panel    Collection Time: 10/21/24  4:34 AM   Result Value Ref Range    Sodium 138 136 - 145 mmol/L    Potassium 4.8 3.5 - 5.1 mmol/L    Chloride 99 98 - 107 mmol/L    CO2 29 23 - 31 mmol/L    Glucose 88 82 - 115 mg/dL    Blood Urea Nitrogen 34.8 (H) 8.4 - 25.7 mg/dL    Creatinine 1.82 (H) 0.72 - 1.25 mg/dL    Calcium 7.9 (L) 8.8 - 10.0 mg/dL    Protein Total 5.8 5.8 - 7.6 gm/dL    Albumin 1.8 (L) 3.4 - 4.8 g/dL    Globulin 4.0 (H) 2.4 - 3.5 gm/dL    Albumin/Globulin Ratio 0.5 (L) 1.1 - 2.0 ratio    Bilirubin Total 0.7 <=1.5 mg/dL     40 - 150 unit/L    ALT 75 (H) 0 - 55 unit/L    AST 65 (H) 5 - 34 unit/L    eGFR 42 mL/min/1.73/m2    Anion Gap 10.0 mEq/L    BUN/Creatinine Ratio 19    Magnesium    Collection Time: 10/21/24  4:34 AM   Result Value Ref Range    Magnesium Level 2.10 1.60 - 2.60 mg/dL   Phosphorus    Collection Time: 10/21/24  4:34 AM   Result Value Ref Range    Phosphorus Level 2.9 2.3 - 4.7 mg/dL   POCT glucose    Collection Time: 10/21/24  6:48 AM   Result Value Ref Range    POCT Glucose 92 70 - 110 mg/dL   CBC with Differential    Collection Time: 10/21/24  7:04 AM   Result Value Ref Range    WBC 10.97 4.50 -  11.50 x10(3)/mcL    RBC 2.99 (L) 4.70 - 6.10 x10(6)/mcL    Hgb 8.7 (L) 14.0 - 18.0 g/dL    Hct 26.6 (L) 42.0 - 52.0 %    MCV 89.0 80.0 - 94.0 fL    MCH 29.1 27.0 - 31.0 pg    MCHC 32.7 (L) 33.0 - 36.0 g/dL    RDW 15.5 11.5 - 17.0 %    Platelet 183 130 - 400 x10(3)/mcL    MPV 10.0 7.4 - 10.4 fL    Neut % 81.8 %    Lymph % 10.5 %    Mono % 6.2 %    Eos % 0.1 %    Basophil % 0.2 %    Lymph # 1.15 0.6 - 4.6 x10(3)/mcL    Neut # 8.98 2.1 - 9.2 x10(3)/mcL    Mono # 0.68 0.1 - 1.3 x10(3)/mcL    Eos # 0.01 0 - 0.9 x10(3)/mcL    Baso # 0.02 <=0.2 x10(3)/mcL    IG# 0.13 (H) 0 - 0.04 x10(3)/mcL    IG% 1.2 %    NRBC% 0.2 %   Blood Gas    Collection Time: 10/21/24  3:49 PM   Result Value Ref Range    Sample Type Arterial Blood     Sample site Right Radial Artery     Drawn by aw rrt     pH, Blood gas 7.470 (H) 7.350 - 7.450    pCO2, Blood gas 48.0 (H) 35.0 - 45.0 mmHg    pO2, Blood gas 129.0 (H) 80.0 - 100.0 mmHg    Sodium, Blood Gas 133 (L) 137 - 145 mmol/L    Potassium, Blood Gas 3.5 3.5 - 5.0 mmol/L    Calcium Level Ionized 1.11 (L) 1.12 - 1.23 mmol/L    TOC2, Blood gas 36.4 mmol/L    Base Excess, Blood gas 10.10 (H) -2.00 - 2.00 mmol/L    sO2, Blood gas 99.1 %    HCO3, Blood gas 34.9 (H) 22.0 - 26.0 mmol/L    THb, Blood gas 8.2 (L) 12 - 16 g/dL    O2 Hb, Blood Gas 96.6 94.0 - 97.0 %    CO Hgb 1.4 0.5 - 1.5 %    Met Hgb 0.7 0.4 - 1.5 %    Allens Test Yes     Oxygen Device, Blood gas High Flow Cannula     LPM 35     FIO2, Blood gas 75 %   POCT glucose    Collection Time: 10/21/24  4:43 PM   Result Value Ref Range    POCT Glucose 100 70 - 110 mg/dL       Assessment/Plan:  Sepsis - resolved  Sacral wound infection - CR Acinetobacter / ESBL Ecoli / Enterococcus / Bacteroides / Peptoniphilus isolates  ANTON  Pyelonephritis per CT   Constipation  Paraplegia  Neurogenic bladder with suprapubic catheter  DM Type II  MRSA L ankle osteomyelitis with retained hardware  Anemia    -Continue Unasyn #8 and Cipro #8  -Plan a 14 day course  unless MRI R hip warrants longer course  -Remains afebrile with leukocytosis resolved  -CXR today with B/L pleural effusions with associated atelectasis and/or infiltrate  -Pt with significant anemia, CT abdomen/pelvis showed L renal rupture with large subcapsular hematoma  -Seen by Vascular, inputs noted. S/P aortogram, left renal angiography with coil embolization of left renal artery on 10/17  -Received multiple units of blood/blood products during his stay  -CT pelvis with contrast showed worsening inflammatory change of sacral ulcer with gas inferior to the R pubic ramus  -Seen by Surgery, inputs noted  -S/P debridement of sacral wound on 10/10  -Sacral wound cultures revealing CR Acinetobacter, ESBL Ecoli, Enterococcus, Bacteroides, Peptoniphilus isolates  -Continue wound care  -Blood cultures negative   -Discussed with patient, sister and nursing staff

## 2024-10-22 NOTE — PROGRESS NOTES
[facility] - 8th Floor Med Surg  Wound Care    Patient Name:  Bianca Khan   MRN:  [id number]  Date: 10/22/2024  Diagnosis: Sacral wound    History:     Past Medical History:   Diagnosis Date    Arthritis     Chronic ulcer of ankle 2022    ESBL (extended spectrum beta-lactamase) producing bacteria infection 2024    Frequent UTI 2019    Generalized anxiety disorder 2022    Neurogenic bladder 2022    Osteomyelitis 2022    Paraplegia     Presence of suprapubic catheter 2022    Pure hypercholesterolemia 2022    Retention of urine, unspecified 2019    Spinal cord injury at T1-T6 level 2018       Social History     Socioeconomic History    Marital status:    Tobacco Use    Smoking status: Some Days     Current packs/day: 0.00     Average packs/day: 0.2 packs/day for 41.5 years (10.4 ttl pk-yrs)     Types: Cigars, Cigarettes     Start date: 1982     Last attempt to quit: 2023     Years since quittin.2    Smokeless tobacco: Never   Substance and Sexual Activity    Alcohol use: Not Currently     Alcohol/week: 2.0 standard drinks of alcohol     Types: 2 Cans of beer per week    Drug use: Not Currently     Types: Oxycodone    Sexual activity: Not Currently     Partners: Female     Birth control/protection: None     Social Drivers of Health     Financial Resource Strain: Low Risk  (10/10/2024)    Overall Financial Resource Strain (CARDIA)     Difficulty of Paying Living Expenses: Not hard at all   Food Insecurity: No Food Insecurity (10/10/2024)    Hunger Vital Sign     Worried About Running Out of Food in the Last Year: Never true     Ran Out of Food in the Last Year: Never true   Transportation Needs: No Transportation Needs (10/10/2024)    TRANSPORTATION NEEDS     Transportation : No   Physical Activity: Inactive (2023)    Exercise Vital Sign     Days of Exercise per Week: 0 days     Minutes of Exercise per Session: 0 min    Stress: No Stress Concern Present (10/10/2024)    [employer] [place] [employer] - Occupational Stress Questionnaire     Feeling of Stress : Only a little   Housing Stability: Low Risk  (10/10/2024)    Housing Stability Vital Sign     Unable to Pay for Housing in the Last Year: No     Homeless in the Last Year: No       Precautions:     Allergies as of 10/08/2024 - Reviewed 10/08/2024   Allergen Reaction Noted    Baclofen Itching and Anxiety 06/17/2019       WOC Assessment Details/Treatment   WOCN follow up for wound vac change. Discussed plan of care with assigned nurse, family at bedside. Attempted to change vac dressing yesterday but patient was not appropriate due to oxygen saturation dropping to 70s; no-rebreather placed on patient. Family refusing vac change at this time due to events from the past couple of days and the family requested that WOCN come back at a later time. Patient is now on vapotherm at this time. Notified assigned nurse of family request. Nursing to complete wet to dry dressing change once the patient is awake. Will change wound vac tomorrow.   10/22/2024

## 2024-10-22 NOTE — PLAN OF CARE
Problem: Adult Inpatient Plan of Care  Goal: Plan of Care Review  Outcome: Progressing  Goal: Patient-Specific Goal (Individualized)  Outcome: Progressing  Goal: Absence of Hospital-Acquired Illness or Injury  Outcome: Progressing  Goal: Optimal Comfort and Wellbeing  Outcome: Progressing  Goal: Readiness for Transition of Care  Outcome: Progressing     Problem: Diabetes Comorbidity  Goal: Blood Glucose Level Within Targeted Range  Outcome: Progressing     Problem: Infection  Goal: Absence of Infection Signs and Symptoms  Outcome: Progressing     Problem: Wound  Goal: Optimal Coping  Outcome: Progressing  Goal: Optimal Functional Ability  Outcome: Progressing  Goal: Absence of Infection Signs and Symptoms  Outcome: Progressing  Goal: Improved Oral Intake  Outcome: Progressing  Goal: Optimal Pain Control and Function  Outcome: Progressing  Goal: Skin Health and Integrity  Outcome: Progressing  Goal: Optimal Wound Healing  Outcome: Progressing     Problem: Skin Injury Risk Increased  Goal: Skin Health and Integrity  Outcome: Progressing     Problem: Pain Acute  Goal: Optimal Pain Control and Function  Outcome: Progressing     Problem: Fall Injury Risk  Goal: Absence of Fall and Fall-Related Injury  Outcome: Progressing

## 2024-10-22 NOTE — CONSULTS
Inpatient Nutrition Assessment    Admit Date: 10/8/2024   Total duration of encounter: 14 days   Patient Age: 59 y.o.    Nutrition Recommendation/Prescription     -Continue Regular Diet as tolerated. Assist with meals and encourage intake.  -Boost Glucose Control TID for additional nourishment; provides 250 kcal and 14 gm protein per container.   -Monitor wt, labs, and intake. Replete phos as medically feasible.     Communication of Recommendations: reviewed with nurse    Nutrition Assessment     Malnutrition Assessment/Nutrition-Focused Physical Exam    Malnutrition Context: acute illness or injury (10/17/24 1218)  Malnutrition Level: moderate (10/17/24 1218)  Energy Intake (Malnutrition):  (family denies) (10/17/24 1218)  Weight Loss (Malnutrition):  (does not meet criteria) (10/17/24 1218)  Subcutaneous Fat (Malnutrition): mild depletion (10/17/24 1218)     Upper Arm Region (Subcutaneous Fat Loss): mild depletion     Muscle Mass (Malnutrition): mild depletion (10/17/24 1218)     Clavicle Bone Region (Muscle Loss): mild depletion                    Fluid Accumulation (Malnutrition): mild (10/17/24 1218)        A minimum of two characteristics is recommended for diagnosis of either severe or non-severe malnutrition.    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 including ESBL E coli, Enterobacter, carbapenem resistant Pseudomonas,  Enterococcus faecalis     Scheduled Medications:  ampicillin-sulbactam, 3 g, Q8H  atorvastatin, 20 mg, Nightly  calcium gluconate 1 g, 1 g, Once  carvediloL, 12.5 mg, BID  ciprofloxacin, 400 mg, Q12H  doxycycline, 100 mg, Q12H  fenofibrate, 48 mg, Daily  FLUoxetine, 20 mg, Daily  gabapentin, 400 mg, Q8H  mupirocin, , BID  oxybutynin, 5 mg, TID  pantoprazole, 40 mg, Daily  senna-docusate 8.6-50 mg, 1 tablet, BID  sodium chloride 0.9%, 10 mL, Q6H  sodium chloride 0.9%, 3 mL, TID    Continuous Infusions:     PRN Medications:   0.9%  NaCl infusion (for blood administration), , Q24H PRN  0.9%  NaCl infusion (for blood administration), , Q24H PRN  0.9%  NaCl infusion (for blood administration), , Q24H PRN  0.9%  NaCl infusion (for blood administration), , Q24H PRN  0.9%  NaCl infusion (for blood administration), , Q24H PRN  0.9%  NaCl infusion (for blood administration), , Q24H PRN  0.9%  NaCl infusion (for blood administration), , Q24H PRN  0.9%  NaCl infusion (for blood administration), , Q24H PRN  acetaminophen, 650 mg, Q4H PRN  albuterol-ipratropium, 3 mL, Q4H PRN  aluminum-magnesium hydroxide-simethicone, 30 mL, QID PRN  dextrose 10%, 12.5 g, PRN  dextrose 10%, 25 g, PRN  diphenhydrAMINE, 50 mg, Q6H PRN  etomidate, , Code/trauma/sedation Med  glucagon (human recombinant), 1 mg, PRN  glucose, 16 g, PRN  glucose, 24 g, PRN  guaiFENesin 100 mg/5 ml, 200 mg, Q4H PRN  hydrALAZINE, 10 mg, Q4H PRN  hyoscyamine, 0.125 mg, Q4H PRN  insulin aspart U-100, 1-10 Units, QID (AC + HS) PRN  melatonin, 6 mg, Nightly PRN  methocarbamoL, 750 mg, TID PRN  morphine, 2 mg, BID PRN  ondansetron, 4 mg, Q4H PRN  oxyCODONE-acetaminophen, 1 tablet, Q4H PRN  polyethylene glycol, 17 g, BID PRN  prochlorperazine, 5 mg, Q6H PRN  rocuronium, , Code/trauma/sedation Med  sodium chloride 0.9%, 10 mL, PRN  sodium chloride 0.9%, 10 mL, PRN  traZODone, 200 mg, Nightly PRN    Calorie Containing IV Medications: no significant kcals from medications at this  time    Recent Labs   Lab 10/17/24  0615 10/17/24  0912 10/17/24  1007 10/18/24  0241 10/18/24  0555 10/18/24  1409 10/18/24  2230 10/19/24  0123 10/19/24  0133 10/20/24  0630 10/20/24  1111 10/20/24  1844 10/21/24  0434 10/21/24  0704 10/22/24  0354    140  --  137  --   --   --  135*  --  137  --   --  138  --  137   K 5.6* 5.1  --  3.6  --   --   --  3.5  --  3.6  --   --  4.8  --  3.7   CALCIUM 9.9 7.5*  --  7.3*  --   --   --  7.3*  --  8.1*  --   --  7.9*  --  7.8*   PHOS 9.5* 8.4*  --  5.4*  --   --   --  4.9*  --  4.3  --   --  2.9  --  1.9*   MG 1.70 1.50*  --  1.90  --   --   --  2.10  --  2.10  --   --  2.10  --  2.00    103  --  96*  --   --   --  98  --  96*  --   --  99  --  100   CO2 16* 21*  --  28  --   --   --  26  --  33*  --   --  29  --  29   BUN 16.4 18.4  --  23.7  --   --   --  30.3*  --  39.3*  --   --  34.8*  --  28.8*   CREATININE 1.94* 1.96*  --  2.57*  --   --   --  2.64*  --  2.17*  --   --  1.82*  --  1.54*   EGFRNORACEVR 39 38  --  28  --   --   --  27  --  34  --   --  42  --  51   GLUCOSE 253* 181*  --  208*  --   --   --  167*  --  76*  --   --  88  --  85   BILITOT 0.2 0.4  --  0.4  --   --   --  0.4  --  0.5  --   --  0.7  --  0.8   ALKPHOS 41 51  --  80  --   --   --  109  --  109  --   --  109  --  90   ALT 39 97*  --  124*  --   --   --  106*  --  86*  --   --  75*  --  53   AST 77* 178*  --  266*  --   --   --  129*  --  82*  --   --  65*  --  35*   ALBUMIN 1.6* 2.1*  --  1.8*  --   --   --  1.7*  --  1.7*  --   --  1.8*  --  1.8*   WBC  --  22.20*  --  21.77*  --  23.38* 19.32*  --  19.42*  19.74* 14.23*  --  13.53*  --  10.97 11.56*   HGB 6.9* 9.4*   < > 7.1*   < > 11.0* 9.1*  --  9.2*  9.0* 7.5* 7.8* 8.6*  --  8.7* 7.0*   HCT 21.3* 27.5*   < > 19.7*   < > 31.2* 25.5*  --  27.1*  26.4* 22.4* 23.6* 26.1*  --  26.6* 21.6*    < > = values in this interval not displayed.     Nutrition Orders:  Diet Adult Regular  Tube Feedings/Formulas 70; 1,400; Peptamen Intense  "VHP; NG (start at 35 ml/hr, advance to goal after 4 hours); 30; Every 4 hours,Tube Feedings/Formulas Other (see comments); NG; Malachi - Orange; 2 times daily    Appetite/Oral Intake: poor/25-50% of meals  Factors Affecting Nutritional Intake: decreased appetite  Social Needs Impacting Access to Food: none identified  Food/Mandaeism/Cultural Preferences: none reported  Food Allergies: none reported  Last Bowel Movement: 10/18/24  Wound(s):     Altered Skin Integrity 23 2230 Right lateral Ankle #6-Tissue loss description: Full thickness       Wound 24 0000 Pressure Injury Right Buttocks-Tissue loss description: Full thickness       Altered Skin Integrity 24 0848 upper Sacral spine-Tissue loss description: Full thickness       Wound 24 0800 Other (comment) Left Heel-Tissue loss description: Full thickness    Comments    10/10: Pt was in surgery at time of visit. Having wound debridement with wound vac placement on Stage 4 wound of rt gluteus. Will add Malachi to assist with wound healing. Recommend vitamin regimen for wounds.     10/17/24 Patient transferred to ICU, on ventilator currently, no propofol. Spoke with patient's wife at bedside who reports patient was eating well with a good appetite prior to ICU transfer, she reports bringing him food from home, good intake of Malachi. Tube feeding recommendation provided.    10/18/24 Consult received for tube feeding, will order.    10/22/24: Per RN, pt did not want to really eat earlier; family present in the room and encouraging intake.     Anthropometrics    Height: 5' 10" (177.8 cm), Height Method: Stated  Last Weight: 79.3 kg (174 lb 13.2 oz) (10/17/24 1212), Weight Method: Bed Scale  BMI (Calculated): 25.1  BMI Classification: normal (BMI 18.5-24.9)        Ideal Body Weight (IBW), Male: 166 lb     % Ideal Body Weight, Male (lb): 96.95 %                 Usual Body Weight (UBW), k.57 kg-77.11 kg  % Usual Body Weight: 109.5  % Weight Change From " Usual Weight: 9.27 %  Usual Weight Provided By: family/caregiver    Wt Readings from Last 5 Encounters:   10/17/24 79.3 kg (174 lb 13.2 oz)   08/27/24 63.5 kg (139 lb 15.9 oz)   06/04/24 71.2 kg (156 lb 15.5 oz)   01/30/24 78.5 kg (173 lb 1 oz)   01/10/24 78.5 kg (173 lb 1 oz)     Weight Change(s) Since Admission:   (10/17) took bed weight 79.3 kg during rounds (estimated 4 kg subtracted for equipment), increase noted  Wt Readings from Last 1 Encounters:   10/17/24 1212 79.3 kg (174 lb 13.2 oz)   10/09/24 0430 73 kg (160 lb 15 oz)   10/08/24 1719 63.5 kg (140 lb)   Admit Weight: 63.5 kg (140 lb) (10/08/24 1719), Weight Method: Stated    Estimated Needs    Weight Used For Calorie Calculations: 79.3 kg (174 lb 13.2 oz)  Energy Calorie Requirements (kcal): 1463-6041 kcal (25-30 kcal/kg)  Energy Need Method: Kcal/kg  Weight Used For Protein Calculations: 79.3 kg (174 lb 13.2 oz)  Protein Requirements: 95 gm (1.2g/kg)  Fluid Requirements (mL): 1983 mL  CHO Requirement: 262-315 gm (45% EEN)  Last Updated: 10/22    Enteral Nutrition Patient not receiving enteral nutrition at this time.    Parenteral Nutrition Patient not receiving parenteral nutrition support at this time.    Evaluation of Received Nutrient Intake    Calories: not meeting estimated needs  Protein: not meeting estimated needs    Patient Education Not applicable.    Nutrition Diagnosis     PES: Inadequate energy intake related to inability to consume sufficient nutrients as evidenced by less than 80% needs met. (active)  PES: Moderate acute disease or injury related malnutrition related to acute illness as evidenced by mild fat depletion, mild muscle depletion, and edema. (active)    Nutrition Interventions     Intervention(s): modified composition of enteral nutrition, modified rate of enteral nutrition, and collaboration with other providers  Goal: Meet greater than 80% of nutritional needs by follow-up. (goal progressing)  Goal: Tolerate enteral feeding  at goal rate by follow-up. (goal progressing)    Nutrition Goals & Monitoring     Dietitian will monitor: food and beverage intake, energy intake, weight, and electrolyte/renal panel  Discharge planning: continue Regular diet  Nutrition Risk/Follow-Up: high (follow-up in 1-4 days)   Please consult if re-assessment needed sooner.

## 2024-10-22 NOTE — AI DETERIORATION ALERT
Artificial Intelligence Notification  [facility]  [address] [place] [address]  Phone: [phone]    This documentation was triggered by an Artificial Intelligence Notification:    Admit Date: 10/8/2024   LOS: 14  Code Status: Full Code  : 1965  Age: 59 y.o.  Weight:   Wt Readings from Last 1 Encounters:   10/17/24 79.3 kg (174 lb 13.2 oz)        Sex: male  Bed: 804/804 A  MRN: [id number]  Attending Physician: Tushar Kirk MD     Date of Alert: 10/22/2024  Time AI Alert Received: 010            Vitals:    10/22/24 0048   BP: (!) 142/70   Pulse: (!) 247   Resp: 16   Temp: 98 °F (36.7 °C)     SpO2: (!) 92 %      Artificial Intelligence alert discussed with Provider:     Name: YOLIE Ritter   Date/Time of Provider Notification: 0210      Patient Condition: Pt. Labs, vitals, and notes reviewed. RRT yesterday for decompensation in respiratory status. HR of 247 charted by error at 0048. Primary nurse noted that pt. Is in bed comfortable with Vapotherm on. Instructed primary nurse to call for any further concern for deterioration at [phone].

## 2024-10-23 LAB
ALBUMIN SERPL-MCNC: 1.9 G/DL (ref 3.4–4.8)
ALBUMIN/GLOB SERPL: 0.5 RATIO (ref 1.1–2)
ALP SERPL-CCNC: 80 UNIT/L (ref 40–150)
ALT SERPL-CCNC: 37 UNIT/L (ref 0–55)
ANION GAP SERPL CALC-SCNC: 6 MEQ/L
AST SERPL-CCNC: 26 UNIT/L (ref 5–34)
BASOPHILS # BLD AUTO: 0.02 X10(3)/MCL
BASOPHILS NFR BLD AUTO: 0.2 %
BILIRUB SERPL-MCNC: 1 MG/DL
BUN SERPL-MCNC: 22.6 MG/DL (ref 8.4–25.7)
CALCIUM SERPL-MCNC: 7.8 MG/DL (ref 8.8–10)
CHLORIDE SERPL-SCNC: 103 MMOL/L (ref 98–107)
CO2 SERPL-SCNC: 30 MMOL/L (ref 23–31)
CREAT SERPL-MCNC: 1.41 MG/DL (ref 0.72–1.25)
CREAT/UREA NIT SERPL: 16
EOSINOPHIL # BLD AUTO: 0.04 X10(3)/MCL (ref 0–0.9)
EOSINOPHIL NFR BLD AUTO: 0.4 %
ERYTHROCYTE [DISTWIDTH] IN BLOOD BY AUTOMATED COUNT: 15.4 % (ref 11.5–17)
GFR SERPLBLD CREATININE-BSD FMLA CKD-EPI: 57 ML/MIN/1.73/M2
GLOBULIN SER-MCNC: 3.6 GM/DL (ref 2.4–3.5)
GLUCOSE SERPL-MCNC: 96 MG/DL (ref 82–115)
HCT VFR BLD AUTO: 21.3 % (ref 42–52)
HGB BLD-MCNC: 6.9 G/DL (ref 14–18)
IMM GRANULOCYTES # BLD AUTO: 0.16 X10(3)/MCL (ref 0–0.04)
IMM GRANULOCYTES NFR BLD AUTO: 1.5 %
LYMPHOCYTES # BLD AUTO: 1.43 X10(3)/MCL (ref 0.6–4.6)
LYMPHOCYTES NFR BLD AUTO: 13.3 %
MAGNESIUM SERPL-MCNC: 1.9 MG/DL (ref 1.6–2.6)
MCH RBC QN AUTO: 28.6 PG (ref 27–31)
MCHC RBC AUTO-ENTMCNC: 32.4 G/DL (ref 33–36)
MCV RBC AUTO: 88.4 FL (ref 80–94)
MONOCYTES # BLD AUTO: 1.06 X10(3)/MCL (ref 0.1–1.3)
MONOCYTES NFR BLD AUTO: 9.9 %
NEUTROPHILS # BLD AUTO: 8.04 X10(3)/MCL (ref 2.1–9.2)
NEUTROPHILS NFR BLD AUTO: 74.7 %
NRBC BLD AUTO-RTO: 0.5 %
PHOSPHATE SERPL-MCNC: 2.4 MG/DL (ref 2.3–4.7)
PLATELET # BLD AUTO: 291 X10(3)/MCL (ref 130–400)
PMV BLD AUTO: 9.7 FL (ref 7.4–10.4)
POCT GLUCOSE: 104 MG/DL (ref 70–110)
POCT GLUCOSE: 108 MG/DL (ref 70–110)
POCT GLUCOSE: 98 MG/DL (ref 70–110)
POTASSIUM SERPL-SCNC: 3.8 MMOL/L (ref 3.5–5.1)
PROT SERPL-MCNC: 5.5 GM/DL (ref 5.8–7.6)
RBC # BLD AUTO: 2.41 X10(6)/MCL (ref 4.7–6.1)
SODIUM SERPL-SCNC: 139 MMOL/L (ref 136–145)
WBC # BLD AUTO: 10.75 X10(3)/MCL (ref 4.5–11.5)

## 2024-10-23 PROCEDURE — 63600175 PHARM REV CODE 636 W HCPCS: Performed by: STUDENT IN AN ORGANIZED HEALTH CARE EDUCATION/TRAINING PROGRAM

## 2024-10-23 PROCEDURE — 25000003 PHARM REV CODE 250: Performed by: INTERNAL MEDICINE

## 2024-10-23 PROCEDURE — 21400001 HC TELEMETRY ROOM

## 2024-10-23 PROCEDURE — 25000003 PHARM REV CODE 250: Performed by: STUDENT IN AN ORGANIZED HEALTH CARE EDUCATION/TRAINING PROGRAM

## 2024-10-23 PROCEDURE — 25000003 PHARM REV CODE 250: Performed by: NURSE PRACTITIONER

## 2024-10-23 PROCEDURE — 94640 AIRWAY INHALATION TREATMENT: CPT

## 2024-10-23 PROCEDURE — 25000242 PHARM REV CODE 250 ALT 637 W/ HCPCS: Performed by: INTERNAL MEDICINE

## 2024-10-23 PROCEDURE — 27000207 HC ISOLATION

## 2024-10-23 PROCEDURE — 27100171 HC OXYGEN HIGH FLOW UP TO 24 HOURS

## 2024-10-23 PROCEDURE — 36415 COLL VENOUS BLD VENIPUNCTURE: CPT

## 2024-10-23 PROCEDURE — 94760 N-INVAS EAR/PLS OXIMETRY 1: CPT

## 2024-10-23 PROCEDURE — 87077 CULTURE AEROBIC IDENTIFY: CPT | Performed by: INTERNAL MEDICINE

## 2024-10-23 PROCEDURE — 94799 UNLISTED PULMONARY SVC/PX: CPT

## 2024-10-23 PROCEDURE — A4216 STERILE WATER/SALINE, 10 ML: HCPCS | Performed by: STUDENT IN AN ORGANIZED HEALTH CARE EDUCATION/TRAINING PROGRAM

## 2024-10-23 PROCEDURE — 99900035 HC TECH TIME PER 15 MIN (STAT)

## 2024-10-23 PROCEDURE — 85025 COMPLETE CBC W/AUTO DIFF WBC: CPT

## 2024-10-23 PROCEDURE — 63600175 PHARM REV CODE 636 W HCPCS: Performed by: INTERNAL MEDICINE

## 2024-10-23 PROCEDURE — 84100 ASSAY OF PHOSPHORUS: CPT

## 2024-10-23 PROCEDURE — 83735 ASSAY OF MAGNESIUM: CPT

## 2024-10-23 PROCEDURE — 99900031 HC PATIENT EDUCATION (STAT)

## 2024-10-23 PROCEDURE — 80053 COMPREHEN METABOLIC PANEL: CPT

## 2024-10-23 RX ADMIN — OXYBUTYNIN CHLORIDE 5 MG: 5 TABLET ORAL at 09:10

## 2024-10-23 RX ADMIN — MUPIROCIN: 20 OINTMENT TOPICAL at 09:10

## 2024-10-23 RX ADMIN — ISODIUM CHLORIDE 3 ML: 0.03 SOLUTION RESPIRATORY (INHALATION) at 08:10

## 2024-10-23 RX ADMIN — MUPIROCIN: 20 OINTMENT TOPICAL at 08:10

## 2024-10-23 RX ADMIN — AMPICILLIN SODIUM AND SULBACTAM SODIUM 3 G: 2; 1 INJECTION, POWDER, FOR SOLUTION INTRAMUSCULAR; INTRAVENOUS at 09:10

## 2024-10-23 RX ADMIN — CARVEDILOL 25 MG: 12.5 TABLET, FILM COATED ORAL at 08:10

## 2024-10-23 RX ADMIN — MORPHINE SULFATE 2 MG: 4 INJECTION, SOLUTION INTRAMUSCULAR; INTRAVENOUS at 08:10

## 2024-10-23 RX ADMIN — PANTOPRAZOLE SODIUM 40 MG: 40 INJECTION, POWDER, LYOPHILIZED, FOR SOLUTION INTRAVENOUS at 09:10

## 2024-10-23 RX ADMIN — SODIUM CHLORIDE, PRESERVATIVE FREE 10 ML: 5 INJECTION INTRAVENOUS at 07:10

## 2024-10-23 RX ADMIN — SENNOSIDES AND DOCUSATE SODIUM 1 TABLET: 50; 8.6 TABLET ORAL at 08:10

## 2024-10-23 RX ADMIN — FLUOXETINE HYDROCHLORIDE 20 MG: 20 CAPSULE ORAL at 09:10

## 2024-10-23 RX ADMIN — METHOCARBAMOL 750 MG: 750 TABLET ORAL at 08:10

## 2024-10-23 RX ADMIN — FENOFIBRATE 48 MG: 48 TABLET, FILM COATED ORAL at 09:10

## 2024-10-23 RX ADMIN — HYOSCYAMINE SULFATE 0.12 MG: 0.12 TABLET SUBLINGUAL at 05:10

## 2024-10-23 RX ADMIN — IPRATROPIUM BROMIDE AND ALBUTEROL SULFATE 3 ML: 2.5; .5 SOLUTION RESPIRATORY (INHALATION) at 08:10

## 2024-10-23 RX ADMIN — GABAPENTIN 400 MG: 300 CAPSULE ORAL at 05:10

## 2024-10-23 RX ADMIN — OXYBUTYNIN CHLORIDE 5 MG: 5 TABLET ORAL at 04:10

## 2024-10-23 RX ADMIN — OXYCODONE AND ACETAMINOPHEN 1 TABLET: 10; 325 TABLET ORAL at 01:10

## 2024-10-23 RX ADMIN — OXYCODONE AND ACETAMINOPHEN 1 TABLET: 10; 325 TABLET ORAL at 05:10

## 2024-10-23 RX ADMIN — ISODIUM CHLORIDE 3 ML: 0.03 SOLUTION RESPIRATORY (INHALATION) at 01:10

## 2024-10-23 RX ADMIN — ISODIUM CHLORIDE 3 ML: 0.03 SOLUTION RESPIRATORY (INHALATION) at 07:10

## 2024-10-23 RX ADMIN — GABAPENTIN 400 MG: 300 CAPSULE ORAL at 08:10

## 2024-10-23 RX ADMIN — SODIUM CHLORIDE, PRESERVATIVE FREE 10 ML: 5 INJECTION INTRAVENOUS at 12:10

## 2024-10-23 RX ADMIN — Medication 6 MG: at 08:10

## 2024-10-23 RX ADMIN — OXYBUTYNIN CHLORIDE 5 MG: 5 TABLET ORAL at 08:10

## 2024-10-23 RX ADMIN — AMPICILLIN SODIUM AND SULBACTAM SODIUM 3 G: 2; 1 INJECTION, POWDER, FOR SOLUTION INTRAMUSCULAR; INTRAVENOUS at 01:10

## 2024-10-23 RX ADMIN — SODIUM CHLORIDE, PRESERVATIVE FREE 10 ML: 5 INJECTION INTRAVENOUS at 01:10

## 2024-10-23 RX ADMIN — CIPROFLOXACIN 400 MG: 2 INJECTION, SOLUTION INTRAVENOUS at 09:10

## 2024-10-23 RX ADMIN — GABAPENTIN 400 MG: 300 CAPSULE ORAL at 01:10

## 2024-10-23 RX ADMIN — SODIUM CHLORIDE, PRESERVATIVE FREE 10 ML: 5 INJECTION INTRAVENOUS at 05:10

## 2024-10-23 RX ADMIN — OXYCODONE AND ACETAMINOPHEN 1 TABLET: 10; 325 TABLET ORAL at 09:10

## 2024-10-23 RX ADMIN — CARVEDILOL 25 MG: 12.5 TABLET, FILM COATED ORAL at 09:10

## 2024-10-23 RX ADMIN — ATORVASTATIN CALCIUM 20 MG: 10 TABLET, FILM COATED ORAL at 08:10

## 2024-10-23 RX ADMIN — SENNOSIDES AND DOCUSATE SODIUM 1 TABLET: 50; 8.6 TABLET ORAL at 09:10

## 2024-10-23 RX ADMIN — AMPICILLIN SODIUM AND SULBACTAM SODIUM 3 G: 2; 1 INJECTION, POWDER, FOR SOLUTION INTRAMUSCULAR; INTRAVENOUS at 05:10

## 2024-10-23 RX ADMIN — DOXYCYCLINE HYCLATE 100 MG: 100 TABLET, COATED ORAL at 09:10

## 2024-10-23 RX ADMIN — DOXYCYCLINE HYCLATE 100 MG: 100 TABLET, COATED ORAL at 08:10
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
## 2024-10-23 NOTE — PLAN OF CARE
"Spoke to pt and his wife Katrina who is at bedside. Pt states "I want to be a DNR, I do not want any one pumping on my chestif my  heart stops"  I provided this info to Dr Kirk.  Pt's wife states she is capable of caring for pt at dc. Pt states at dc he wants to go home. Current with [facility]. Has hospital bed, trapez, electric and sameera w/c, bsc.   Pt is asking for home oxygen. I told him when it is closer to dc we can eval if he will qualify for home o2. He voiced understanding.  Clinical updates sent to [facility].  "
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
## 2024-10-24 LAB
ALBUMIN SERPL-MCNC: 1.9 G/DL (ref 3.4–4.8)
ALBUMIN/GLOB SERPL: 0.5 RATIO (ref 1.1–2)
ALP SERPL-CCNC: 82 UNIT/L (ref 40–150)
ALT SERPL-CCNC: 27 UNIT/L (ref 0–55)
ANION GAP SERPL CALC-SCNC: 8 MEQ/L
AST SERPL-CCNC: 22 UNIT/L (ref 5–34)
BASOPHILS # BLD AUTO: 0.03 X10(3)/MCL
BASOPHILS NFR BLD AUTO: 0.3 %
BILIRUB SERPL-MCNC: 1.2 MG/DL
BUN SERPL-MCNC: 16.3 MG/DL (ref 8.4–25.7)
CALCIUM SERPL-MCNC: 7.7 MG/DL (ref 8.8–10)
CHLORIDE SERPL-SCNC: 101 MMOL/L (ref 98–107)
CO2 SERPL-SCNC: 25 MMOL/L (ref 23–31)
CREAT SERPL-MCNC: 1.37 MG/DL (ref 0.72–1.25)
CREAT/UREA NIT SERPL: 12
EOSINOPHIL # BLD AUTO: 0.06 X10(3)/MCL (ref 0–0.9)
EOSINOPHIL NFR BLD AUTO: 0.5 %
ERYTHROCYTE [DISTWIDTH] IN BLOOD BY AUTOMATED COUNT: 15.5 % (ref 11.5–17)
GFR SERPLBLD CREATININE-BSD FMLA CKD-EPI: 59 ML/MIN/1.73/M2
GLOBULIN SER-MCNC: 3.8 GM/DL (ref 2.4–3.5)
GLUCOSE SERPL-MCNC: 93 MG/DL (ref 82–115)
HCT VFR BLD AUTO: 23.1 % (ref 42–52)
HGB BLD-MCNC: 7.4 G/DL (ref 14–18)
IMM GRANULOCYTES # BLD AUTO: 0.38 X10(3)/MCL (ref 0–0.04)
IMM GRANULOCYTES NFR BLD AUTO: 3.4 %
LYMPHOCYTES # BLD AUTO: 1.48 X10(3)/MCL (ref 0.6–4.6)
LYMPHOCYTES NFR BLD AUTO: 13.4 %
MAGNESIUM SERPL-MCNC: 1.6 MG/DL (ref 1.6–2.6)
MCH RBC QN AUTO: 28.5 PG (ref 27–31)
MCHC RBC AUTO-ENTMCNC: 32 G/DL (ref 33–36)
MCV RBC AUTO: 88.8 FL (ref 80–94)
MONOCYTES # BLD AUTO: 1.39 X10(3)/MCL (ref 0.1–1.3)
MONOCYTES NFR BLD AUTO: 12.6 %
NEUTROPHILS # BLD AUTO: 7.71 X10(3)/MCL (ref 2.1–9.2)
NEUTROPHILS NFR BLD AUTO: 69.8 %
NRBC BLD AUTO-RTO: 0.5 %
PHOSPHATE SERPL-MCNC: 2.5 MG/DL (ref 2.3–4.7)
PLATELET # BLD AUTO: 351 X10(3)/MCL (ref 130–400)
PMV BLD AUTO: 9.4 FL (ref 7.4–10.4)
POCT GLUCOSE: 100 MG/DL (ref 70–110)
POCT GLUCOSE: 110 MG/DL (ref 70–110)
POCT GLUCOSE: 99 MG/DL (ref 70–110)
POCT GLUCOSE: 99 MG/DL (ref 70–110)
POTASSIUM SERPL-SCNC: 3.8 MMOL/L (ref 3.5–5.1)
PROT SERPL-MCNC: 5.7 GM/DL (ref 5.8–7.6)
RBC # BLD AUTO: 2.6 X10(6)/MCL (ref 4.7–6.1)
SODIUM SERPL-SCNC: 134 MMOL/L (ref 136–145)
WBC # BLD AUTO: 11.05 X10(3)/MCL (ref 4.5–11.5)

## 2024-10-24 PROCEDURE — 25000003 PHARM REV CODE 250: Performed by: NURSE PRACTITIONER

## 2024-10-24 PROCEDURE — 80053 COMPREHEN METABOLIC PANEL: CPT

## 2024-10-24 PROCEDURE — 99900035 HC TECH TIME PER 15 MIN (STAT)

## 2024-10-24 PROCEDURE — 25000003 PHARM REV CODE 250: Performed by: STUDENT IN AN ORGANIZED HEALTH CARE EDUCATION/TRAINING PROGRAM

## 2024-10-24 PROCEDURE — 84100 ASSAY OF PHOSPHORUS: CPT

## 2024-10-24 PROCEDURE — 94640 AIRWAY INHALATION TREATMENT: CPT

## 2024-10-24 PROCEDURE — 94799 UNLISTED PULMONARY SVC/PX: CPT

## 2024-10-24 PROCEDURE — 85025 COMPLETE CBC W/AUTO DIFF WBC: CPT

## 2024-10-24 PROCEDURE — 63600175 PHARM REV CODE 636 W HCPCS: Performed by: INTERNAL MEDICINE

## 2024-10-24 PROCEDURE — 94760 N-INVAS EAR/PLS OXIMETRY 1: CPT

## 2024-10-24 PROCEDURE — 25000003 PHARM REV CODE 250: Performed by: INTERNAL MEDICINE

## 2024-10-24 PROCEDURE — A4216 STERILE WATER/SALINE, 10 ML: HCPCS | Performed by: STUDENT IN AN ORGANIZED HEALTH CARE EDUCATION/TRAINING PROGRAM

## 2024-10-24 PROCEDURE — 99900031 HC PATIENT EDUCATION (STAT)

## 2024-10-24 PROCEDURE — 63600175 PHARM REV CODE 636 W HCPCS: Performed by: STUDENT IN AN ORGANIZED HEALTH CARE EDUCATION/TRAINING PROGRAM

## 2024-10-24 PROCEDURE — 25000242 PHARM REV CODE 250 ALT 637 W/ HCPCS: Performed by: INTERNAL MEDICINE

## 2024-10-24 PROCEDURE — 27000646 HC AEROBIKA DEVICE

## 2024-10-24 PROCEDURE — 21400001 HC TELEMETRY ROOM

## 2024-10-24 PROCEDURE — 36415 COLL VENOUS BLD VENIPUNCTURE: CPT

## 2024-10-24 PROCEDURE — 27000207 HC ISOLATION

## 2024-10-24 PROCEDURE — 94664 DEMO&/EVAL PT USE INHALER: CPT

## 2024-10-24 PROCEDURE — 83735 ASSAY OF MAGNESIUM: CPT

## 2024-10-24 PROCEDURE — 27100171 HC OXYGEN HIGH FLOW UP TO 24 HOURS

## 2024-10-24 RX ORDER — NIFEDIPINE 60 MG/1
60 TABLET, EXTENDED RELEASE ORAL DAILY
Status: DISCONTINUED | OUTPATIENT
Start: 2024-10-24 | End: 2024-11-10

## 2024-10-24 RX ORDER — HYDRALAZINE HYDROCHLORIDE 20 MG/ML
20 INJECTION INTRAMUSCULAR; INTRAVENOUS EVERY 4 HOURS PRN
Status: DISCONTINUED | OUTPATIENT
Start: 2024-10-24 | End: 2024-11-26 | Stop reason: HOSPADM

## 2024-10-24 RX ADMIN — HYDRALAZINE HYDROCHLORIDE 10 MG: 20 INJECTION INTRAMUSCULAR; INTRAVENOUS at 04:10

## 2024-10-24 RX ADMIN — SODIUM CHLORIDE, PRESERVATIVE FREE 10 ML: 5 INJECTION INTRAVENOUS at 05:10

## 2024-10-24 RX ADMIN — FENOFIBRATE 48 MG: 48 TABLET, FILM COATED ORAL at 08:10

## 2024-10-24 RX ADMIN — CIPROFLOXACIN 400 MG: 2 INJECTION, SOLUTION INTRAVENOUS at 10:10

## 2024-10-24 RX ADMIN — SODIUM CHLORIDE, PRESERVATIVE FREE 10 ML: 5 INJECTION INTRAVENOUS at 12:10

## 2024-10-24 RX ADMIN — AMPICILLIN SODIUM AND SULBACTAM SODIUM 3 G: 2; 1 INJECTION, POWDER, FOR SOLUTION INTRAMUSCULAR; INTRAVENOUS at 09:10

## 2024-10-24 RX ADMIN — SENNOSIDES AND DOCUSATE SODIUM 1 TABLET: 50; 8.6 TABLET ORAL at 09:10

## 2024-10-24 RX ADMIN — ATORVASTATIN CALCIUM 20 MG: 10 TABLET, FILM COATED ORAL at 09:10

## 2024-10-24 RX ADMIN — MUPIROCIN: 20 OINTMENT TOPICAL at 09:10

## 2024-10-24 RX ADMIN — SODIUM CHLORIDE, PRESERVATIVE FREE 10 ML: 5 INJECTION INTRAVENOUS at 06:10

## 2024-10-24 RX ADMIN — ISODIUM CHLORIDE 3 ML: 0.03 SOLUTION RESPIRATORY (INHALATION) at 08:10

## 2024-10-24 RX ADMIN — CARVEDILOL 25 MG: 12.5 TABLET, FILM COATED ORAL at 08:10

## 2024-10-24 RX ADMIN — TRAZODONE HYDROCHLORIDE 200 MG: 100 TABLET ORAL at 09:10

## 2024-10-24 RX ADMIN — MORPHINE SULFATE 2 MG: 4 INJECTION, SOLUTION INTRAMUSCULAR; INTRAVENOUS at 12:10

## 2024-10-24 RX ADMIN — ISODIUM CHLORIDE 3 ML: 0.03 SOLUTION RESPIRATORY (INHALATION) at 01:10

## 2024-10-24 RX ADMIN — OXYCODONE AND ACETAMINOPHEN 1 TABLET: 10; 325 TABLET ORAL at 01:10

## 2024-10-24 RX ADMIN — HYDRALAZINE HYDROCHLORIDE 20 MG: 20 INJECTION INTRAMUSCULAR; INTRAVENOUS at 12:10

## 2024-10-24 RX ADMIN — PANTOPRAZOLE SODIUM 40 MG: 40 INJECTION, POWDER, LYOPHILIZED, FOR SOLUTION INTRAVENOUS at 08:10

## 2024-10-24 RX ADMIN — IPRATROPIUM BROMIDE AND ALBUTEROL SULFATE 3 ML: 2.5; .5 SOLUTION RESPIRATORY (INHALATION) at 08:10

## 2024-10-24 RX ADMIN — OXYCODONE AND ACETAMINOPHEN 1 TABLET: 10; 325 TABLET ORAL at 09:10

## 2024-10-24 RX ADMIN — GABAPENTIN 400 MG: 300 CAPSULE ORAL at 09:10

## 2024-10-24 RX ADMIN — DOXYCYCLINE HYCLATE 100 MG: 100 TABLET, COATED ORAL at 09:10

## 2024-10-24 RX ADMIN — DOXYCYCLINE HYCLATE 100 MG: 100 TABLET, COATED ORAL at 08:10

## 2024-10-24 RX ADMIN — CIPROFLOXACIN 400 MG: 2 INJECTION, SOLUTION INTRAVENOUS at 12:10

## 2024-10-24 RX ADMIN — NIFEDIPINE 60 MG: 60 TABLET, FILM COATED, EXTENDED RELEASE ORAL at 10:10

## 2024-10-24 RX ADMIN — FLUOXETINE HYDROCHLORIDE 20 MG: 20 CAPSULE ORAL at 08:10

## 2024-10-24 RX ADMIN — GABAPENTIN 400 MG: 300 CAPSULE ORAL at 05:10

## 2024-10-24 RX ADMIN — MORPHINE SULFATE 2 MG: 4 INJECTION, SOLUTION INTRAMUSCULAR; INTRAVENOUS at 04:10

## 2024-10-24 RX ADMIN — OXYCODONE AND ACETAMINOPHEN 1 TABLET: 10; 325 TABLET ORAL at 08:10

## 2024-10-24 RX ADMIN — IPRATROPIUM BROMIDE AND ALBUTEROL SULFATE 3 ML: 2.5; .5 SOLUTION RESPIRATORY (INHALATION) at 01:10

## 2024-10-24 RX ADMIN — AMPICILLIN SODIUM AND SULBACTAM SODIUM 3 G: 2; 1 INJECTION, POWDER, FOR SOLUTION INTRAMUSCULAR; INTRAVENOUS at 05:10

## 2024-10-24 RX ADMIN — CARVEDILOL 25 MG: 12.5 TABLET, FILM COATED ORAL at 09:10

## 2024-10-24 RX ADMIN — OXYBUTYNIN CHLORIDE 5 MG: 5 TABLET ORAL at 04:10

## 2024-10-24 RX ADMIN — GABAPENTIN 400 MG: 300 CAPSULE ORAL at 01:10

## 2024-10-24 RX ADMIN — MUPIROCIN: 20 OINTMENT TOPICAL at 08:10

## 2024-10-24 RX ADMIN — AMPICILLIN SODIUM AND SULBACTAM SODIUM 3 G: 2; 1 INJECTION, POWDER, FOR SOLUTION INTRAMUSCULAR; INTRAVENOUS at 03:10

## 2024-10-24 RX ADMIN — OXYBUTYNIN CHLORIDE 5 MG: 5 TABLET ORAL at 09:10

## 2024-10-24 RX ADMIN — MORPHINE SULFATE 2 MG: 4 INJECTION, SOLUTION INTRAMUSCULAR; INTRAVENOUS at 07:10

## 2024-10-24 RX ADMIN — SENNOSIDES AND DOCUSATE SODIUM 1 TABLET: 50; 8.6 TABLET ORAL at 08:10

## 2024-10-24 RX ADMIN — OXYBUTYNIN CHLORIDE 5 MG: 5 TABLET ORAL at 08:10

## 2024-10-24 NOTE — PROCEDURES
"Bianca Khan is a 59 y.o. male patient.    Temp: 99 °F (37.2 °C) (10/24/24 0813)  Pulse: (!) 50 (10/24/24 0835)  Resp: 20 (10/24/24 0835)  BP: (!) 175/93 (10/24/24 0813)  SpO2: 95 % (10/24/24 0835)  Weight: 79.3 kg (174 lb 13.2 oz) (10/17/24 1212)  Height: 5' 10" (177.8 cm) (10/09/24 0430)    PICC  Date/Time: 10/24/2024 11:25 AM  Performed by: Dipak Holm RN  Consent Done: Yes  Time out: Immediately prior to procedure a time out was called to verify the correct patient, procedure, equipment, support staff and site/side marked as required  Preparation: skin prepped with ChloraPrep  Skin prep agent dried: skin prep agent completely dried prior to procedure  Sterile barriers: all five maximum sterile barriers used - cap, mask, sterile gown, sterile gloves, and large sterile sheet  Hand hygiene: hand hygiene performed prior to central venous catheter insertion  Catheter type: single lumen  Catheter size: 4 Fr  Catheter Length: 15cm    Ultrasound guidance: yes  Number of attempts: 1  Post-procedure: blood return through all ports and sterile dressing applied    Comments: Right arm has uncompressable bracial vein/ Recommend obtaining order for Ultrasound of upper arm          Dipak Holm Rn  10/24/2024    "

## 2024-10-24 NOTE — PROGRESS NOTES
[facility] - 8th Floor Med Surg  Wound Care    Patient Name:  Bianca Khan   MRN:  [id number]  Date: 10/24/2024  Diagnosis: Sacral wound    History:     Past Medical History:   Diagnosis Date    Arthritis     Chronic ulcer of ankle 2022    ESBL (extended spectrum beta-lactamase) producing bacteria infection 2024    Frequent UTI 2019    Generalized anxiety disorder 2022    Neurogenic bladder 2022    Osteomyelitis 2022    Paraplegia     Presence of suprapubic catheter 2022    Pure hypercholesterolemia 2022    Retention of urine, unspecified 2019    Spinal cord injury at T1-T6 level 2018       Social History     Socioeconomic History    Marital status:    Tobacco Use    Smoking status: Some Days     Current packs/day: 0.00     Average packs/day: 0.2 packs/day for 41.5 years (10.4 ttl pk-yrs)     Types: Cigars, Cigarettes     Start date: 1982     Last attempt to quit: 2023     Years since quittin.2    Smokeless tobacco: Never   Substance and Sexual Activity    Alcohol use: Not Currently     Alcohol/week: 2.0 standard drinks of alcohol     Types: 2 Cans of beer per week    Drug use: Not Currently     Types: Oxycodone    Sexual activity: Not Currently     Partners: Female     Birth control/protection: None     Social Drivers of Health     Financial Resource Strain: Low Risk  (10/10/2024)    Overall Financial Resource Strain (CARDIA)     Difficulty of Paying Living Expenses: Not hard at all   Food Insecurity: No Food Insecurity (10/10/2024)    Hunger Vital Sign     Worried About Running Out of Food in the Last Year: Never true     Ran Out of Food in the Last Year: Never true   Transportation Needs: No Transportation Needs (10/10/2024)    TRANSPORTATION NEEDS     Transportation : No   Physical Activity: Inactive (2023)    Exercise Vital Sign     Days of Exercise per Week: 0 days     Minutes of Exercise per Session: 0 min    Stress: No Stress Concern Present (10/10/2024)    [employer] [place] [employer] - Occupational Stress Questionnaire     Feeling of Stress : Only a little   Housing Stability: Low Risk  (10/10/2024)    Housing Stability Vital Sign     Unable to Pay for Housing in the Last Year: No     Homeless in the Last Year: No       Precautions:     Allergies as of 10/08/2024 - Reviewed 10/08/2024   Allergen Reaction Noted    Baclofen Itching and Anxiety 06/17/2019       WOC Assessment Details/Treatment        10/23/24 1025   WOCN Assessment   Visit Date 10/23/24   Visit Time 1025   Consult Type Follow Up   WOCN Speciality Wound   WOCN List wound vac   Wound pressure;surgical   Procedure wound vac   Intervention chart review;assessed;applied;orders   Teaching on-going        Altered Skin Integrity 06/28/23 2230 Right lateral Ankle #6   Date First Assessed/Time First Assessed: 06/28/23 2230   Altered Skin Integrity Present on Admission - Did Patient arrive to the hospital with altered skin?: yes  Side: Right  Orientation: lateral  Location: Ankle  Wound Number: #6  Is this injury dev...   Wound Image    Description of Altered Skin Integrity Full thickness tissue loss. Subcutaneous fat may be visible but bone, tendon or muscle are not exposed   Dressing Appearance Intact;Moist drainage   Drainage Amount Scant   Drainage Characteristics/Odor Serosanguineous   Appearance Pink;Red;Yellow   Tissue loss description Full thickness   Black (%), Wound Tissue Color 0 %   Red (%), Wound Tissue Color 60 %   Yellow (%), Wound Tissue Color 40 %   Periwound Area Intact   Wound Edges Defined   Wound Length (cm) 2 cm   Wound Width (cm) 1.3 cm   Wound Depth (cm) 0.2 cm   Wound Volume (cm^3) 0.52 cm^3   Wound Surface Area (cm^2) 2.6 cm^2   Care Cleansed with:;Antimicrobial agent  (vashe)   Dressing Changed;Calcium alginate;Silver;Gauze   Off Loading Off loading shoe        Altered Skin Integrity 01/09/24 0848 upper Sacral spine   Date  First Assessed/Time First Assessed: 01/09/24 0848   Altered Skin Integrity Present on Admission - Did Patient arrive to the hospital with altered skin?: suspected hospital acquired  Orientation: upper  Location: Sacral spine   Wound Image    Description of Altered Skin Integrity Full thickness tissue loss. Subcutaneous fat may be visible but bone, tendon or muscle are not exposed   Drainage Amount Small   Drainage Characteristics/Odor Serosanguineous   Appearance Pink;Red;Yellow   Tissue loss description Full thickness   Black (%), Wound Tissue Color 0 %   Red (%), Wound Tissue Color 80 %   Yellow (%), Wound Tissue Color 20 %   Periwound Area Intact;Dry   Wound Edges Defined   Wound Length (cm) 6 cm   Wound Width (cm) 5 cm   Wound Depth (cm) 1.4 cm   Wound Volume (cm^3) 42 cm^3   Wound Surface Area (cm^2) 30 cm^2   Care Cleansed with:;Antimicrobial agent  (vashe)   Dressing Applied;Gauze, wet to dry;Absorptive Pad        Wound 05/30/24 0000 Pressure Injury Right Buttocks   Date First Assessed/Time First Assessed: 05/30/24 0000   Primary Wound Type: Pressure Injury  Side: Right  Location: Buttocks  Is this injury device related?: No   Wound Image    Pressure Injury Stage U   Dressing Appearance Intact;Moist drainage   Drainage Amount Small   Drainage Characteristics/Odor Serosanguineous   Appearance Pink;Red;Yellow   Tissue loss description Full thickness   Black (%), Wound Tissue Color 0 %   Red (%), Wound Tissue Color 70 %   Yellow (%), Wound Tissue Color 30 %   Periwound Area Intact   Wound Edges Defined   Wound Length (cm) 4 cm   Wound Width (cm) 5 cm   Wound Depth (cm) 3 cm   Wound Volume (cm^3) 60 cm^3   Wound Surface Area (cm^2) 20 cm^2   Care Cleansed with:;Antimicrobial agent  (vashe)   Dressing Removed;Gauze, wet to dry;Applied  (NPWT set @125mmHg)        Wound 08/22/24 0800 Other (comment) Left Heel   Date First Assessed/Time First Assessed: 08/22/24 0800   Primary Wound Type: Other (comment)  Side: Left   Location: Heel   Wound Image    Dressing Appearance Intact;Moist drainage   Drainage Amount Small   Drainage Characteristics/Odor Serosanguineous   Appearance Pink;Red;Yellow   Tissue loss description Full thickness   Black (%), Wound Tissue Color 0 %   Red (%), Wound Tissue Color 90 %   Yellow (%), Wound Tissue Color 10 %   Periwound Area Intact   Wound Edges Defined   Wound Length (cm) 3 cm   Wound Width (cm) 3 cm   Wound Depth (cm) 0.2 cm   Wound Volume (cm^3) 1.8 cm^3   Wound Surface Area (cm^2) 9 cm^2   Care Cleansed with:;Antimicrobial agent  (vashe)   Dressing Changed;Calcium alginate;Silver;Absorptive Pad;Rolled gauze        Negative Pressure Wound Therapy  10/10/24 1017   Placement Date/Time: 10/10/24 1017   Location: Buttocks  Additional Comments: SN: [id number]   NPWT Type Vacuum Therapy   Therapy Setting NPWT Continuous therapy   Pressure Setting NPWT 125 mmHg   Therapy Interventions NPWT Seal intact;Canister changed  (reapplied on)   Sponges Inserted NPWT 1;White;Black     WOCN follow up for re-application of wound vac to right buttock and follow up for sacral wound, Left heel, and right lateral ankle. Discussed POC w/ pt.'s nurse Thalia. Family at bedside. Had assistance from Jaime from wound care team w/ wound vac placement. Explained reason for visit. Wound vac placed to right buttock-cleaned w/ vashe, 1 white foam, 1 black foam tracked to another black foam, seal intact w/ no leakage or blockage detected w/ setting at 125mmHg. Pt. Tolerated application well with no signs of pain or discomfort. Cont. Tx recs in orders for sacral and right lateral ankle. Treatment recommendations for left heel: Clean w/ vashe, dry well, apply Ca+ Ag to red/good tissue wound bed, vashe moistened mesalt to the yellow/slough part of heel, abd pad, kerlix, and secure w/ tape. BID/Prn if soilage. Nursing to cont. Tx recs and preventative measures. Will follow up on Friday for wound vac change.      10/24/2024

## 2024-10-24 NOTE — PROGRESS NOTES
Infectious Diseases Progress Note  58 y/o male with past medical history of T-spine cord injury with paraplegia, diabetes, HTN, hepatitis-C, chronic MRSA L ankle wound/osteomyelitis, was on suppressive doxycycline and R knee infection/septic arthritis and osteomyelitis with GBS/Enterococcus and Ecoli isolates who presented to ER on 10/8 with increased generalized pain, increased foul smelling drainage from sacral wound with fever and chills. He was noted to be hypotensive per EMS. On admit he was afebrile without leukocytosis. ESR 98 and .80. MRSA PCR (-). U/A with 21-50 WBC, 0-5 RBC, 75 LE, no bacteria, negative nitrites. Urine culture negative. Blood cultures negative. Sacral wound culture with many Acinetobacter, many ESBL Ecoli, moderate Enterococcus, Bacteroides and Peptoniphilus. CT pelvis with contrast showed worsening inflammatory change of the sacral decubitus ulcers with gas now identified inferior to the right pubic ramus, stool noted throughout the colon as may be seen with constipation, pyelonephritis is not excluded. Seen by Surgery and underwent debridement of sacral wound. During his stay he was noted to have large amounts of bleeding from sacral wound. CT abdomen/pelvis on 10/17 showed interval development of L renal rupture with large subcapsular hematoma, L retroperitoneal hemorrhage. He received multiple blood/blood products over the past couple days. He was noted to be hypotensive and was transferred to ICU on 10/17. He was seen by Vascular and underwent Aortogram,  left renal angiography with coil embolization of left renal artery and right groin central venous line insertion on 10/17.   He is currently on Unasyn and Cipro. Remains on chronic oral suppression with Doxycycline    Subjective:  Lying in bed, no acute distress. Currently on 45% 15L Vapotherm. No new complaints reported. Afebrile.    Review of Systems   Constitutional:  Positive for malaise/fatigue.   HENT: Negative.      Eyes: Negative.    Respiratory:  Positive for shortness of breath.    Cardiovascular: Negative.    Gastrointestinal: Negative.    Musculoskeletal: Negative.    Skin: Negative.    Neurological:  Positive for focal weakness and weakness.   All other systems reviewed and are negative.      Review of patient's allergies indicates:   Allergen Reactions    Baclofen Itching and Anxiety       Past Medical History:   Diagnosis Date    Arthritis     Chronic ulcer of ankle 05/26/2022    ESBL (extended spectrum beta-lactamase) producing bacteria infection 08/30/2024    Frequent UTI 07/02/2019    Generalized anxiety disorder 05/26/2022    Neurogenic bladder 05/26/2022    Osteomyelitis 05/26/2022    Paraplegia     Presence of suprapubic catheter 05/26/2022    Pure hypercholesterolemia 05/26/2022    Retention of urine, unspecified 08/09/2019    Spinal cord injury at T1-T6 level 04/20/2018       Past Surgical History:   Procedure Laterality Date    ANGIOGRAM, RENAL ARTERIES, BILATERAL N/A 10/17/2024    Procedure: Angiogram, Renal Arteries, Bilateral;  Surgeon: Pati King MD;  Location: [facility] CATH LAB;  Service: Peripheral Vascular;  Laterality: N/A;  left renal artery coiling    APPLICATION OF WOUND VACUUM-ASSISTED CLOSURE DEVICE N/A 10/10/2024    Procedure: APPLICATION, WOUND VAC;  Surgeon: Rob Sinclair MD;  Location: [facility];  Service: General;  Laterality: N/A;    EGD, WITH CLOSED BIOPSY N/A 8/29/2024    Procedure: EGD, WITH CLOSED BIOPSY;  Surgeon: Mikal Segovia MD;  Location: [facility] ENDOSCOPY;  Service: Gastroenterology;  Laterality: N/A;    ESOPHAGOGASTRODUODENOSCOPY N/A 8/29/2024    Procedure: EGD;  Surgeon: Mikal Segovia MD;  Location: [facility] ENDOSCOPY;  Service: Gastroenterology;  Laterality: N/A;    FRACTURE SURGERY  2021    INCISION AND DRAINAGE, LOWER EXTREMITY Right 06/29/2023    Procedure: INCISION AND DRAINAGE, LOWER EXTREMITY;  Surgeon: Prabhu Shen DO;  Location: [facility];  Service:  Orthopedics;  Laterality: Right;  supine bone foam wash stuff cultures    INCISION AND DRAINAGE, LOWER EXTREMITY Right 2023    Procedure: INCISION AND DRAINAGE, LOWER EXTREMITY;  Surgeon: Prabhu Shen DO;  Location: [facility] OR;  Service: Orthopedics;  Laterality: Right;  supine any table bone foam wash stuff possible wound vac    INCISION AND DRAINAGE, LOWER EXTREMITY Right 2023    Procedure: INCISION AND DRAINAGE, LOWER EXTREMITY;  Surgeon: Prabhu Shen DO;  Location: [facility] OR;  Service: Orthopedics;  Laterality: Right;  supine bone foam vascular bed removal of distal femoral plate, wash stuff, possible AKA    INSERTION OF INTRAMEDULLARY MARISA Right 08/10/2022    Procedure: INSERTION, INTRAMEDULLARY MARISA RIGHT TIBIA;  Surgeon: Jorge Orellana MD;  Location: [facility] OR;  Service: Orthopedics;  Laterality: Right;    JOINT REPLACEMENT      Ankel    PRESSURE ULCER DEBRIDEMENT N/A 10/10/2024    Procedure: DEBRIDEMENT, PRESSURE ULCER;  Surgeon: Rob Sinclair MD;  Location: [facility];  Service: General;  Laterality: N/A;  sacral wound, R buttock wound    REMOVAL OF HARDWARE FROM LOWER EXTREMITY Right 2023    Procedure: REMOVAL, HARDWARE, LOWER EXTREMITY;  Surgeon: Prabhu Shen DO;  Location: [facility] OR;  Service: Orthopedics;  Laterality: Right;    SPINE SURGERY  2018       Social History     Socioeconomic History    Marital status:    Tobacco Use    Smoking status: Some Days     Current packs/day: 0.00     Average packs/day: 0.2 packs/day for 41.5 years (10.4 ttl pk-yrs)     Types: Cigars, Cigarettes     Start date: 1982     Last attempt to quit: 2023     Years since quittin.2    Smokeless tobacco: Never   Substance and Sexual Activity    Alcohol use: Not Currently     Alcohol/week: 2.0 standard drinks of alcohol     Types: 2 Cans of beer per week    Drug use: Not Currently     Types: Oxycodone    Sexual activity: Not Currently     Partners: Female     Birth control/protection:  None     Social Drivers of Health     Financial Resource Strain: Low Risk  (10/10/2024)    Overall Financial Resource Strain (CARDIA)     Difficulty of Paying Living Expenses: Not hard at all   Food Insecurity: No Food Insecurity (10/10/2024)    Hunger Vital Sign     Worried About Running Out of Food in the Last Year: Never true     Ran Out of Food in the Last Year: Never true   Transportation Needs: No Transportation Needs (10/10/2024)    TRANSPORTATION NEEDS     Transportation : No   Physical Activity: Inactive (12/11/2023)    Exercise Vital Sign     Days of Exercise per Week: 0 days     Minutes of Exercise per Session: 0 min   Stress: No Stress Concern Present (10/10/2024)    Citizen of [place] [employer] - Occupational Stress Questionnaire     Feeling of Stress : Only a little   Housing Stability: Low Risk  (10/10/2024)    Housing Stability Vital Sign     Unable to Pay for Housing in the Last Year: No     Homeless in the Last Year: No       Scheduled Meds:   ampicillin-sulbactam  3 g Intravenous Q8H    atorvastatin  20 mg Per NG tube Nightly    calcium gluconate 1 g  1 g Intravenous Once    carvediloL  25 mg Oral BID    ciprofloxacin  400 mg Intravenous Q12H    doxycycline  100 mg Oral Q12H    fenofibrate  48 mg Per NG tube Daily    FLUoxetine  20 mg Per G Tube Daily    gabapentin  400 mg Per NG tube Q8H    mupirocin   Nasal BID    oxybutynin  5 mg Per NG tube TID    pantoprazole  40 mg Intravenous Daily    senna-docusate 8.6-50 mg  1 tablet Oral BID    sodium chloride 0.9%  10 mL Intravenous Q6H    sodium chloride 0.9%  3 mL Nebulization TID     Continuous Infusions:      PRN Meds:  Current Facility-Administered Medications:     0.9%  NaCl infusion (for blood administration), , Intravenous, Q24H PRN    0.9%  NaCl infusion (for blood administration), , Intravenous, Q24H PRN    0.9%  NaCl infusion (for blood administration), , Intravenous, Q24H PRN    0.9%  NaCl infusion (for blood administration), ,  "Intravenous, Q24H PRN    0.9%  NaCl infusion (for blood administration), , Intravenous, Q24H PRN    0.9%  NaCl infusion (for blood administration), , Intravenous, Q24H PRN    0.9%  NaCl infusion (for blood administration), , Intravenous, Q24H PRN    0.9%  NaCl infusion (for blood administration), , Intravenous, Q24H PRN    acetaminophen, 650 mg, Oral, Q4H PRN    albuterol-ipratropium, 3 mL, Nebulization, Q4H PRN    aluminum-magnesium hydroxide-simethicone, 30 mL, Oral, QID PRN    dextrose 10%, 12.5 g, Intravenous, PRN    dextrose 10%, 25 g, Intravenous, PRN    diphenhydrAMINE, 50 mg, Intravenous, Q6H PRN    etomidate, , Intravenous, Code/trauma/sedation Med    glucagon (human recombinant), 1 mg, Intramuscular, PRN    glucose, 16 g, Oral, PRN    glucose, 24 g, Oral, PRN    guaiFENesin 100 mg/5 ml, 200 mg, Oral, Q4H PRN    hydrALAZINE, 10 mg, Intravenous, Q4H PRN    hyoscyamine, 0.125 mg, Sublingual, Q4H PRN    insulin aspart U-100, 1-10 Units, Subcutaneous, QID (AC + HS) PRN    melatonin, 6 mg, Oral, Nightly PRN    methocarbamoL, 750 mg, Oral, TID PRN    morphine, 2 mg, Intravenous, BID PRN    ondansetron, 4 mg, Intravenous, Q4H PRN    oxyCODONE-acetaminophen, 1 tablet, Oral, Q4H PRN    polyethylene glycol, 17 g, Oral, BID PRN    prochlorperazine, 5 mg, Intravenous, Q6H PRN    rocuronium, , Intravenous, Code/trauma/sedation Med    sodium chloride 0.9%, 10 mL, Intravenous, PRN    Flushing PICC/Midline Protocol, , , Until Discontinued **AND** sodium chloride 0.9%, 10 mL, Intravenous, Q6H **AND** sodium chloride 0.9%, 10 mL, Intravenous, PRN    traZODone, 200 mg, Oral, Nightly PRN    Objective:  BP (!) 179/84   Pulse 97   Temp 98.1 °F (36.7 °C) (Oral)   Resp 18   Ht 5' 10" (1.778 m)   Wt 79.3 kg (174 lb 13.2 oz)   SpO2 (!) 94%   BMI 25.08 kg/m²     Physical Exam:   Physical Exam  Vitals reviewed.   HENT:      Head: Normocephalic.   Cardiovascular:      Rate and Rhythm: Normal rate and regular rhythm.   Pulmonary: "      Effort: Pulmonary effort is normal. No respiratory distress.      Breath sounds: Normal breath sounds. No wheezing.   Abdominal:      General: Bowel sounds are normal. There is no distension.      Palpations: Abdomen is soft.      Tenderness: There is no abdominal tenderness.   Genitourinary:     Comments: Suprapubic catheter  Musculoskeletal:      Cervical back: Normal range of motion.      Comments: Paraplegia   Skin:     Findings: No rash.      Comments: Wounds dressed   Neurological:      Mental Status: He is alert and oriented to person, place, and time.   Psychiatric:         Behavior: Behavior normal.     Imaging    Lab Review   Recent Results (from the past 24 hours)   Comprehensive Metabolic Panel    Collection Time: 10/23/24  4:30 AM   Result Value Ref Range    Sodium 139 136 - 145 mmol/L    Potassium 3.8 3.5 - 5.1 mmol/L    Chloride 103 98 - 107 mmol/L    CO2 30 23 - 31 mmol/L    Glucose 96 82 - 115 mg/dL    Blood Urea Nitrogen 22.6 8.4 - 25.7 mg/dL    Creatinine 1.41 (H) 0.72 - 1.25 mg/dL    Calcium 7.8 (L) 8.8 - 10.0 mg/dL    Protein Total 5.5 (L) 5.8 - 7.6 gm/dL    Albumin 1.9 (L) 3.4 - 4.8 g/dL    Globulin 3.6 (H) 2.4 - 3.5 gm/dL    Albumin/Globulin Ratio 0.5 (L) 1.1 - 2.0 ratio    Bilirubin Total 1.0 <=1.5 mg/dL    ALP 80 40 - 150 unit/L    ALT 37 0 - 55 unit/L    AST 26 5 - 34 unit/L    eGFR 57 mL/min/1.73/m2    Anion Gap 6.0 mEq/L    BUN/Creatinine Ratio 16    Magnesium    Collection Time: 10/23/24  4:30 AM   Result Value Ref Range    Magnesium Level 1.90 1.60 - 2.60 mg/dL   Phosphorus    Collection Time: 10/23/24  4:30 AM   Result Value Ref Range    Phosphorus Level 2.4 2.3 - 4.7 mg/dL   CBC with Differential    Collection Time: 10/23/24  4:30 AM   Result Value Ref Range    WBC 10.75 4.50 - 11.50 x10(3)/mcL    RBC 2.41 (L) 4.70 - 6.10 x10(6)/mcL    Hgb 6.9 (L) 14.0 - 18.0 g/dL    Hct 21.3 (L) 42.0 - 52.0 %    MCV 88.4 80.0 - 94.0 fL    MCH 28.6 27.0 - 31.0 pg    MCHC 32.4 (L) 33.0 - 36.0 g/dL     RDW 15.4 11.5 - 17.0 %    Platelet 291 130 - 400 x10(3)/mcL    MPV 9.7 7.4 - 10.4 fL    Neut % 74.7 %    Lymph % 13.3 %    Mono % 9.9 %    Eos % 0.4 %    Basophil % 0.2 %    Lymph # 1.43 0.6 - 4.6 x10(3)/mcL    Neut # 8.04 2.1 - 9.2 x10(3)/mcL    Mono # 1.06 0.1 - 1.3 x10(3)/mcL    Eos # 0.04 0 - 0.9 x10(3)/mcL    Baso # 0.02 <=0.2 x10(3)/mcL    IG# 0.16 (H) 0 - 0.04 x10(3)/mcL    IG% 1.5 %    NRBC% 0.5 %   POCT glucose    Collection Time: 10/23/24 10:51 AM   Result Value Ref Range    POCT Glucose 98 70 - 110 mg/dL   POCT glucose    Collection Time: 10/23/24  4:30 PM   Result Value Ref Range    POCT Glucose 104 70 - 110 mg/dL   POCT glucose    Collection Time: 10/23/24  8:33 PM   Result Value Ref Range    POCT Glucose 108 70 - 110 mg/dL       Assessment/Plan:  Sepsis - resolved  Sacral wound infection - CR Acinetobacter / ESBL Ecoli / Enterococcus / Bacteroides / Peptoniphilus isolates  ANTON  Pyelonephritis per CT   Constipation  Paraplegia  Neurogenic bladder with suprapubic catheter  DM Type II  MRSA L ankle osteomyelitis with retained hardware  Anemia    -Continue Unasyn #10 and Cipro #10  -Plan a 14 day course unless MRI R hip warrants longer course  -Remains afebrile with leukocytosis resolved  -Currently on 45% 15L Vapotherm, wean as tolerated  -CXR on 10/21 with B/L pleural effusions with associated atelectasis and/or infiltrate  -Pt with significant anemia, CT abdomen/pelvis showed L renal rupture with large subcapsular hematoma  -Seen by Vascular, inputs noted. S/P aortogram, left renal angiography with coil embolization of left renal artery on 10/17  -Received multiple units of blood/blood products during his stay  -CT pelvis with contrast showed worsening inflammatory change of sacral ulcer with gas inferior to the R pubic ramus  -Seen by Surgery, inputs noted  -S/P debridement of sacral wound on 10/10  -Sacral wound cultures revealing CR Acinetobacter, ESBL Ecoli, Enterococcus, Bacteroides,  Peptoniphilus isolates  -Continue wound care  -Blood cultures negative   -Discussed with patient and nursing staff. Pt is now a DNR
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
## 2024-10-24 NOTE — OP NOTE
[facility] - Periop Services  Operative Note     SUMMARY      Surgery Date: 10/10/2024      Surgeons and Role:     * Rob Sinclair MD - Primary     Assisting Surgeon: None     Pre-op Diagnosis:  Sacral wound [S31.000A]     Post-op Diagnosis:  Post-Op Diagnosis Codes:     * Sacral wound [S31.000A]     Procedure(s) (LRB):  *DEBRIDEMENT* INACTIVE (N/A)  APPLICATION, WOUND VAC (N/A)     Anesthesia: Choice     Implants:  * No implants in log *     Operative Findings: 7 x 2 x 4 stage IV decubitus ulcer of right gluteus; no evidence gross of osteomyelitis      Estimated Blood Loss: * No values recorded between 10/10/2024 10:01 AM and 10/10/2024 10:36 AM *     Estimated Blood Loss has not been documented. EBL = 10 mL.         Specimens:   Specimen (24h ago, onward)        None     Operative Technique:  Risks and benefits were discussed with patient and family at bedside, informed consent signed.  Patient was taken to the OR and placed supine, endotracheal intubation was successful.  The right gluteus was then prepped and draped in sterile fashion.  A time out was completed to confirm correct patient, procedure, and all staff was in agreement.      Using a 10 blade frankly necrotic tissue was sharply excised from the right gluteal wound, which included excision of muscle and subcutaneous fat.  Additionally Metzenbaum scissors and curved Osuna scissors were used when needed.  This continued until healthy-appearing tissue was encountered.  Hemostasis was achieved with Bovie electrocautery.  At completion the wound measured 7 x 2 x 4 cm.  The wound was packed with sterile dressing.  The patient tolerated procedure and was transferred from the OR in stable condition.      SURGEON:  Rob Sinclair MD    Date: 10/10/2024  Time: 10:36 AM

## 2024-10-25 LAB
BASOPHILS # BLD AUTO: 0.03 X10(3)/MCL
BASOPHILS NFR BLD AUTO: 0.3 %
EOSINOPHIL # BLD AUTO: 0.06 X10(3)/MCL (ref 0–0.9)
EOSINOPHIL NFR BLD AUTO: 0.5 %
ERYTHROCYTE [DISTWIDTH] IN BLOOD BY AUTOMATED COUNT: 15.6 % (ref 11.5–17)
HCT VFR BLD AUTO: 21.9 % (ref 42–52)
HGB BLD-MCNC: 7.1 G/DL (ref 14–18)
IMM GRANULOCYTES # BLD AUTO: 0.25 X10(3)/MCL (ref 0–0.04)
IMM GRANULOCYTES NFR BLD AUTO: 2.2 %
LYMPHOCYTES # BLD AUTO: 1.81 X10(3)/MCL (ref 0.6–4.6)
LYMPHOCYTES NFR BLD AUTO: 15.9 %
MCH RBC QN AUTO: 28.4 PG (ref 27–31)
MCHC RBC AUTO-ENTMCNC: 32.4 G/DL (ref 33–36)
MCV RBC AUTO: 87.6 FL (ref 80–94)
MONOCYTES # BLD AUTO: 1.26 X10(3)/MCL (ref 0.1–1.3)
MONOCYTES NFR BLD AUTO: 11.1 %
NEUTROPHILS # BLD AUTO: 7.95 X10(3)/MCL (ref 2.1–9.2)
NEUTROPHILS NFR BLD AUTO: 70 %
NRBC BLD AUTO-RTO: 0.9 %
PLATELET # BLD AUTO: 403 X10(3)/MCL (ref 130–400)
PMV BLD AUTO: 9.1 FL (ref 7.4–10.4)
POCT GLUCOSE: 171 MG/DL (ref 70–110)
RBC # BLD AUTO: 2.5 X10(6)/MCL (ref 4.7–6.1)
WBC # BLD AUTO: 11.36 X10(3)/MCL (ref 4.5–11.5)

## 2024-10-25 PROCEDURE — 25000003 PHARM REV CODE 250: Performed by: INTERNAL MEDICINE

## 2024-10-25 PROCEDURE — 85025 COMPLETE CBC W/AUTO DIFF WBC: CPT | Performed by: INTERNAL MEDICINE

## 2024-10-25 PROCEDURE — 21400001 HC TELEMETRY ROOM

## 2024-10-25 PROCEDURE — 94664 DEMO&/EVAL PT USE INHALER: CPT

## 2024-10-25 PROCEDURE — 99900035 HC TECH TIME PER 15 MIN (STAT)

## 2024-10-25 PROCEDURE — 94761 N-INVAS EAR/PLS OXIMETRY MLT: CPT

## 2024-10-25 PROCEDURE — 63600175 PHARM REV CODE 636 W HCPCS: Performed by: STUDENT IN AN ORGANIZED HEALTH CARE EDUCATION/TRAINING PROGRAM

## 2024-10-25 PROCEDURE — 25000003 PHARM REV CODE 250: Performed by: STUDENT IN AN ORGANIZED HEALTH CARE EDUCATION/TRAINING PROGRAM

## 2024-10-25 PROCEDURE — 99900031 HC PATIENT EDUCATION (STAT)

## 2024-10-25 PROCEDURE — 94640 AIRWAY INHALATION TREATMENT: CPT

## 2024-10-25 PROCEDURE — 94760 N-INVAS EAR/PLS OXIMETRY 1: CPT

## 2024-10-25 PROCEDURE — 27100171 HC OXYGEN HIGH FLOW UP TO 24 HOURS

## 2024-10-25 PROCEDURE — 63600175 PHARM REV CODE 636 W HCPCS: Performed by: INTERNAL MEDICINE

## 2024-10-25 PROCEDURE — A4216 STERILE WATER/SALINE, 10 ML: HCPCS | Performed by: STUDENT IN AN ORGANIZED HEALTH CARE EDUCATION/TRAINING PROGRAM

## 2024-10-25 PROCEDURE — 25000242 PHARM REV CODE 250 ALT 637 W/ HCPCS: Performed by: INTERNAL MEDICINE

## 2024-10-25 PROCEDURE — 27000207 HC ISOLATION

## 2024-10-25 PROCEDURE — 25000003 PHARM REV CODE 250: Performed by: NURSE PRACTITIONER

## 2024-10-25 PROCEDURE — 36415 COLL VENOUS BLD VENIPUNCTURE: CPT | Performed by: INTERNAL MEDICINE

## 2024-10-25 RX ADMIN — INSULIN ASPART 2 UNITS: 100 INJECTION, SOLUTION INTRAVENOUS; SUBCUTANEOUS at 09:10

## 2024-10-25 RX ADMIN — FLUOXETINE HYDROCHLORIDE 20 MG: 20 CAPSULE ORAL at 09:10

## 2024-10-25 RX ADMIN — SENNOSIDES AND DOCUSATE SODIUM 1 TABLET: 50; 8.6 TABLET ORAL at 09:10

## 2024-10-25 RX ADMIN — ISODIUM CHLORIDE 3 ML: 0.03 SOLUTION RESPIRATORY (INHALATION) at 08:10

## 2024-10-25 RX ADMIN — SODIUM CHLORIDE, PRESERVATIVE FREE 10 ML: 5 INJECTION INTRAVENOUS at 12:10

## 2024-10-25 RX ADMIN — GABAPENTIN 400 MG: 300 CAPSULE ORAL at 05:10

## 2024-10-25 RX ADMIN — OXYBUTYNIN CHLORIDE 5 MG: 5 TABLET ORAL at 03:10

## 2024-10-25 RX ADMIN — CIPROFLOXACIN 400 MG: 2 INJECTION, SOLUTION INTRAVENOUS at 11:10

## 2024-10-25 RX ADMIN — NIFEDIPINE 60 MG: 60 TABLET, FILM COATED, EXTENDED RELEASE ORAL at 09:10

## 2024-10-25 RX ADMIN — ONDANSETRON 4 MG: 2 INJECTION INTRAMUSCULAR; INTRAVENOUS at 03:10

## 2024-10-25 RX ADMIN — MUPIROCIN: 20 OINTMENT TOPICAL at 09:10

## 2024-10-25 RX ADMIN — OXYCODONE AND ACETAMINOPHEN 1 TABLET: 10; 325 TABLET ORAL at 09:10

## 2024-10-25 RX ADMIN — MORPHINE SULFATE 2 MG: 4 INJECTION, SOLUTION INTRAMUSCULAR; INTRAVENOUS at 12:10

## 2024-10-25 RX ADMIN — OXYCODONE AND ACETAMINOPHEN 1 TABLET: 10; 325 TABLET ORAL at 05:10

## 2024-10-25 RX ADMIN — GABAPENTIN 400 MG: 300 CAPSULE ORAL at 03:10

## 2024-10-25 RX ADMIN — ATORVASTATIN CALCIUM 20 MG: 10 TABLET, FILM COATED ORAL at 09:10

## 2024-10-25 RX ADMIN — OXYBUTYNIN CHLORIDE 5 MG: 5 TABLET ORAL at 09:10

## 2024-10-25 RX ADMIN — PANTOPRAZOLE SODIUM 40 MG: 40 INJECTION, POWDER, LYOPHILIZED, FOR SOLUTION INTRAVENOUS at 09:10

## 2024-10-25 RX ADMIN — AMPICILLIN SODIUM AND SULBACTAM SODIUM 3 G: 2; 1 INJECTION, POWDER, FOR SOLUTION INTRAMUSCULAR; INTRAVENOUS at 05:10

## 2024-10-25 RX ADMIN — CARVEDILOL 25 MG: 12.5 TABLET, FILM COATED ORAL at 09:10

## 2024-10-25 RX ADMIN — AMPICILLIN SODIUM AND SULBACTAM SODIUM 3 G: 2; 1 INJECTION, POWDER, FOR SOLUTION INTRAMUSCULAR; INTRAVENOUS at 09:10

## 2024-10-25 RX ADMIN — SODIUM CHLORIDE, PRESERVATIVE FREE 10 ML: 5 INJECTION INTRAVENOUS at 05:10

## 2024-10-25 RX ADMIN — IPRATROPIUM BROMIDE AND ALBUTEROL SULFATE 3 ML: 2.5; .5 SOLUTION RESPIRATORY (INHALATION) at 08:10

## 2024-10-25 RX ADMIN — SODIUM CHLORIDE, PRESERVATIVE FREE 10 ML: 5 INJECTION INTRAVENOUS at 06:10

## 2024-10-25 RX ADMIN — METHOCARBAMOL 750 MG: 750 TABLET ORAL at 12:10

## 2024-10-25 RX ADMIN — FENOFIBRATE 48 MG: 48 TABLET, FILM COATED ORAL at 09:10

## 2024-10-25 RX ADMIN — CIPROFLOXACIN 400 MG: 2 INJECTION, SOLUTION INTRAVENOUS at 09:10

## 2024-10-25 RX ADMIN — ISODIUM CHLORIDE 3 ML: 0.03 SOLUTION RESPIRATORY (INHALATION) at 02:10

## 2024-10-25 RX ADMIN — GABAPENTIN 400 MG: 300 CAPSULE ORAL at 09:10

## 2024-10-25 RX ADMIN — DOXYCYCLINE HYCLATE 100 MG: 100 TABLET, COATED ORAL at 09:10

## 2024-10-25 RX ADMIN — TRAZODONE HYDROCHLORIDE 200 MG: 100 TABLET ORAL at 09:10

## 2024-10-25 RX ADMIN — AMPICILLIN SODIUM AND SULBACTAM SODIUM 3 G: 2; 1 INJECTION, POWDER, FOR SOLUTION INTRAMUSCULAR; INTRAVENOUS at 03:10

## 2024-10-25 RX ADMIN — OXYCODONE AND ACETAMINOPHEN 1 TABLET: 10; 325 TABLET ORAL at 03:10

## 2024-10-25 NOTE — PROCEDURES
"Bianca Khan is a 59 y.o. male patient.    Temp: 97.9 °F (36.6 °C) (10/25/24 1155)  Pulse: 95 (10/25/24 1446)  Resp: 18 (10/25/24 1540)  BP: 135/74 (10/25/24 1155)  SpO2: (!) 94 % (10/25/24 1446)  Weight: 79.3 kg (174 lb 13.2 oz) (10/17/24 1212)  Height: 5' 10" (177.8 cm) (10/09/24 0430)       Bladder Cath    Date/Time: 10/25/2024 5:34 PM  Location procedure was performed: [facility]    Performed by: Cyn Garcia FNP  Authorized by: Cyn Garcia FNP  Indications: catheter change and neurogenic bladder  Catheter size: 22 Fr  Complicated insertion: no  Altered anatomy: no  Bladder irrigation: no  Patient tolerance: Patient tolerated the procedure well with no immediate complications  Comments: SP tube exchanged          10/25/2024    "

## 2024-10-25 NOTE — CONSULTS
Inpatient Nutrition Assessment    Admit Date: 10/8/2024   Total duration of encounter: 17 days   Patient Age: 59 y.o.    Nutrition Recommendation/Prescription     -Continue Heart Healthy, Diabetic Diet as tolerated. Assist with meals and encourage intake.  -Boost VHC (pt requests strawberry flavor only) BID to provide 530 kcal and 22 gm protein per container.   -Malachi BID for wound healing.  -D/c current TF order.  -Monitor wt, labs, and intake.     Communication of Recommendations: reviewed with patient and reviewed with family    Nutrition Assessment     Malnutrition Assessment/Nutrition-Focused Physical Exam    Malnutrition Context: acute illness or injury (10/17/24 1218)  Malnutrition Level: moderate (10/17/24 1218)  Energy Intake (Malnutrition):  (family denies) (10/17/24 1218)  Weight Loss (Malnutrition):  (does not meet criteria) (10/17/24 1218)  Subcutaneous Fat (Malnutrition): mild depletion (10/17/24 1218)     Upper Arm Region (Subcutaneous Fat Loss): mild depletion     Muscle Mass (Malnutrition): mild depletion (10/17/24 1218)     Clavicle Bone Region (Muscle Loss): mild depletion                    Fluid Accumulation (Malnutrition): mild (10/17/24 1218)        A minimum of two characteristics is recommended for diagnosis of either severe or non-severe malnutrition.    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 including ESBL E  coli, Enterobacter, carbapenem resistant Pseudomonas, Enterococcus faecalis     Scheduled Medications:  ampicillin-sulbactam, 3 g, Q8H  atorvastatin, 20 mg, Nightly  calcium gluconate 1 g, 1 g, Once  carvediloL, 25 mg, BID  ciprofloxacin, 400 mg, Q12H  doxycycline, 100 mg, Q12H  fenofibrate, 48 mg, Daily  FLUoxetine, 20 mg, Daily  gabapentin, 400 mg, Q8H  mupirocin, , BID  NIFEdipine, 60 mg, Daily  oxybutynin, 5 mg, TID  pantoprazole, 40 mg, Daily  senna-docusate 8.6-50 mg, 1 tablet, BID  sodium chloride 0.9%, 10 mL, Q6H  sodium chloride 0.9%, 3 mL, TID    Continuous Infusions:     PRN Medications:   0.9%  NaCl infusion (for blood administration), , Q24H PRN  0.9%  NaCl infusion (for blood administration), , Q24H PRN  0.9%  NaCl infusion (for blood administration), , Q24H PRN  0.9%  NaCl infusion (for blood administration), , Q24H PRN  0.9%  NaCl infusion (for blood administration), , Q24H PRN  0.9%  NaCl infusion (for blood administration), , Q24H PRN  0.9%  NaCl infusion (for blood administration), , Q24H PRN  0.9%  NaCl infusion (for blood administration), , Q24H PRN  acetaminophen, 650 mg, Q4H PRN  albuterol-ipratropium, 3 mL, Q4H PRN  aluminum-magnesium hydroxide-simethicone, 30 mL, QID PRN  dextrose 10%, 12.5 g, PRN  dextrose 10%, 25 g, PRN  diphenhydrAMINE, 50 mg, Q6H PRN  etomidate, , Code/trauma/sedation Med  glucagon (human recombinant), 1 mg, PRN  glucose, 16 g, PRN  glucose, 24 g, PRN  guaiFENesin 100 mg/5 ml, 200 mg, Q4H PRN  hydrALAZINE, 20 mg, Q4H PRN  hyoscyamine, 0.125 mg, Q4H PRN  insulin aspart U-100, 1-10 Units, QID (AC + HS) PRN  melatonin, 6 mg, Nightly PRN  methocarbamoL, 750 mg, TID PRN  morphine, 2 mg, BID PRN  ondansetron, 4 mg, Q4H PRN  oxyCODONE-acetaminophen, 1 tablet, Q4H PRN  polyethylene glycol, 17 g, BID PRN  prochlorperazine, 5 mg, Q6H PRN  rocuronium, , Code/trauma/sedation Med  sodium chloride 0.9%, 10 mL, PRN  sodium chloride 0.9%, 10 mL, PRN  traZODone, 200 mg, Nightly  PRN    Calorie Containing IV Medications: no significant kcals from medications at this time    Recent Labs   Lab 10/19/24  0123 10/19/24  0133 10/20/24  0630 10/20/24  1111 10/20/24  1844 10/21/24  0434 10/21/24  0704 10/22/24  0354 10/23/24  0430 10/24/24  0534   *  --  137  --   --  138  --  137 139 134*   K 3.5  --  3.6  --   --  4.8  --  3.7 3.8 3.8   CALCIUM 7.3*  --  8.1*  --   --  7.9*  --  7.8* 7.8* 7.7*   PHOS 4.9*  --  4.3  --   --  2.9  --  1.9* 2.4 2.5   MG 2.10  --  2.10  --   --  2.10  --  2.00 1.90 1.60   CL 98  --  96*  --   --  99  --  100 103 101   CO2 26  --  33*  --   --  29  --  29 30 25   BUN 30.3*  --  39.3*  --   --  34.8*  --  28.8* 22.6 16.3   CREATININE 2.64*  --  2.17*  --   --  1.82*  --  1.54* 1.41* 1.37*   EGFRNORACEVR 27  --  34  --   --  42  --  51 57 59   GLUCOSE 167*  --  76*  --   --  88  --  85 96 93   BILITOT 0.4  --  0.5  --   --  0.7  --  0.8 1.0 1.2   ALKPHOS 109  --  109  --   --  109  --  90 80 82   *  --  86*  --   --  75*  --  53 37 27   *  --  82*  --   --  65*  --  35* 26 22   ALBUMIN 1.7*  --  1.7*  --   --  1.8*  --  1.8* 1.9* 1.9*   WBC  --  19.42*  19.74* 14.23*  --  13.53*  --  10.97 11.56* 10.75 11.05   HGB  --  9.2*  9.0* 7.5* 7.8* 8.6*  --  8.7* 7.0* 6.9* 7.4*   HCT  --  27.1*  26.4* 22.4* 23.6* 26.1*  --  26.6* 21.6* 21.3* 23.1*     Nutrition Orders:  Diet Adult Regular Cardiac (Low Na/Chol), Diabetic; 2000 Calories (up to 75 gm per meal)  Tube Feedings/Formulas 70; 1,400; Peptamen Intense VHP; NG (start at 35 ml/hr, advance to goal after 4 hours); 30; Every 4 hours,Tube Feedings/Formulas Other (see comments); NG; Malachi - Orange; 2 times daily    Appetite/Oral Intake: fair/25-50% of meals  Factors Affecting Nutritional Intake: decreased appetite  Social Needs Impacting Access to Food: none identified  Food/Rastafari/Cultural Preferences: none reported  Food Allergies: none reported  Last Bowel Movement: 10/18/24  Wound(s):     Altered  "Skin Integrity 23 2230 Right lateral Ankle #6-Tissue loss description: Full thickness       Wound 24 0000 Pressure Injury Right Buttocks-Tissue loss description: Full thickness       Altered Skin Integrity 24 0848 upper Sacral spine-Tissue loss description: Full thickness       Wound 24 0800 Other (comment) Left Heel-Tissue loss description: Full thickness    Comments    10/10: Pt was in surgery at time of visit. Having wound debridement with wound vac placement on Stage 4 wound of rt gluteus. Will add Malachi to assist with wound healing. Recommend vitamin regimen for wounds.     10/17/24 Patient transferred to ICU, on ventilator currently, no propofol. Spoke with patient's wife at bedside who reports patient was eating well with a good appetite prior to ICU transfer, she reports bringing him food from home, good intake of Malachi. Tube feeding recommendation provided.    10/18/24 Consult received for tube feeding, will order.    10/22/24: Per RN, pt did not want to really eat earlier; family present in the room and encouraging intake.     10/24/24: Per pt's family member, pt ate ~ half of breakfast. Pt denies n/v; would like a strawberry flavored oral supplement.     Anthropometrics    Height: 5' 10" (177.8 cm), Height Method: Stated  Last Weight: 79.3 kg (174 lb 13.2 oz) (10/17/24 1212), Weight Method: Bed Scale  BMI (Calculated): 25.1  BMI Classification: normal (BMI 18.5-24.9)        Ideal Body Weight (IBW), Male: 166 lb     % Ideal Body Weight, Male (lb): 96.95 %                 Usual Body Weight (UBW), k.57 kg-77.11 kg  % Usual Body Weight: 109.5  % Weight Change From Usual Weight: 9.27 %  Usual Weight Provided By: family/caregiver    Wt Readings from Last 5 Encounters:   10/17/24 79.3 kg (174 lb 13.2 oz)   24 63.5 kg (139 lb 15.9 oz)   24 71.2 kg (156 lb 15.5 oz)   24 78.5 kg (173 lb 1 oz)   01/10/24 78.5 kg (173 lb 1 oz)     Weight Change(s) Since Admission: "   (10/17) took bed weight 79.3 kg during rounds (estimated 4 kg subtracted for equipment), increase noted  Wt Readings from Last 1 Encounters:   10/17/24 1212 79.3 kg (174 lb 13.2 oz)   10/09/24 0430 73 kg (160 lb 15 oz)   10/08/24 1719 63.5 kg (140 lb)   Admit Weight: 63.5 kg (140 lb) (10/08/24 1719), Weight Method: Stated    Estimated Needs    Weight Used For Calorie Calculations: 79.3 kg (174 lb 13.2 oz)  Energy Calorie Requirements (kcal): 1983-2379 kcal (25-30 kcal/kg)  Energy Need Method: Kcal/kg  Weight Used For Protein Calculations: 79.3 kg (174 lb 13.2 oz)  Protein Requirements: 95 gm (1.2g/kg)  Fluid Requirements (mL): 1983 mL  CHO Requirement: 262-315 gm (45% EEN)  Last Updated: 10/22    Enteral Nutrition Patient not receiving enteral nutrition at this time.    Parenteral Nutrition Patient not receiving parenteral nutrition support at this time.    Evaluation of Received Nutrient Intake    Calories: not meeting estimated needs  Protein: not meeting estimated needs    Patient Education Not applicable.    Nutrition Diagnosis     PES: Inadequate energy intake related to inability to consume sufficient nutrients as evidenced by less than 80% needs met. (active)  PES: Moderate acute disease or injury related malnutrition related to acute illness as evidenced by mild fat depletion, mild muscle depletion, and edema. (active)    Nutrition Interventions     Intervention(s): modified composition of enteral nutrition, modified rate of enteral nutrition, and collaboration with other providers  Goal: Meet greater than 80% of nutritional needs by follow-up. (goal progressing)  Goal: Tolerate enteral feeding at goal rate by follow-up. (goal progressing)    Nutrition Goals & Monitoring     Dietitian will monitor: food and beverage intake, energy intake, weight, and electrolyte/renal panel  Discharge planning: continue Heart Healthy, Diabetic diet with Boost VHC/Malachi oral supplements  Nutrition Risk/Follow-Up: high  (follow-up in 1-4 days)   Please consult if re-assessment needed sooner.

## 2024-10-25 NOTE — PROGRESS NOTES
[facility] - 8th Floor Med Surg  Wound Care    Patient Name:  Bianca Khan   MRN:  [id number]  Date: 10/25/2024  Diagnosis: Sacral wound    History:     Past Medical History:   Diagnosis Date    Arthritis     Chronic ulcer of ankle 2022    ESBL (extended spectrum beta-lactamase) producing bacteria infection 2024    Frequent UTI 2019    Generalized anxiety disorder 2022    Neurogenic bladder 2022    Osteomyelitis 2022    Paraplegia     Presence of suprapubic catheter 2022    Pure hypercholesterolemia 2022    Retention of urine, unspecified 2019    Spinal cord injury at T1-T6 level 2018       Social History     Socioeconomic History    Marital status:    Tobacco Use    Smoking status: Some Days     Current packs/day: 0.00     Average packs/day: 0.2 packs/day for 41.5 years (10.4 ttl pk-yrs)     Types: Cigars, Cigarettes     Start date: 1982     Last attempt to quit: 2023     Years since quittin.2    Smokeless tobacco: Never   Substance and Sexual Activity    Alcohol use: Not Currently     Alcohol/week: 2.0 standard drinks of alcohol     Types: 2 Cans of beer per week    Drug use: Not Currently     Types: Oxycodone    Sexual activity: Not Currently     Partners: Female     Birth control/protection: None     Social Drivers of Health     Financial Resource Strain: Low Risk  (10/10/2024)    Overall Financial Resource Strain (CARDIA)     Difficulty of Paying Living Expenses: Not hard at all   Food Insecurity: No Food Insecurity (10/10/2024)    Hunger Vital Sign     Worried About Running Out of Food in the Last Year: Never true     Ran Out of Food in the Last Year: Never true   Transportation Needs: No Transportation Needs (10/10/2024)    TRANSPORTATION NEEDS     Transportation : No   Physical Activity: Inactive (2023)    Exercise Vital Sign     Days of Exercise per Week: 0 days     Minutes of Exercise per Session: 0 min    Stress: No Stress Concern Present (10/10/2024)    [employer] - Occupational Stress Questionnaire     Feeling of Stress : Only a little   Housing Stability: Low Risk  (10/10/2024)    Housing Stability Vital Sign     Unable to Pay for Housing in the Last Year: No     Homeless in the Last Year: No       Precautions:     Allergies as of 10/08/2024 - Reviewed 10/08/2024   Allergen Reaction Noted    Baclofen Itching and Anxiety 06/17/2019       WO Assessment Details/Treatment        10/25/24 0923   WOCN Assessment   Visit Date 10/25/24   Visit Time 0923   Consult Type Follow Up   WOCN Speciality Wound   WOCN List wound vac   Procedure wound vac   Intervention chart review;assessed;changed   Teaching on-going        Wound 05/30/24 0000 Pressure Injury Right Buttocks   Date First Assessed/Time First Assessed: 05/30/24 0000   Primary Wound Type: Pressure Injury  Side: Right  Location: Buttocks  Is this injury device related?: No   Wound Image    Pressure Injury Stage U   Dressing Appearance Intact;Moist drainage   Drainage Amount Small   Drainage Characteristics/Odor Serosanguineous   Appearance Intact   Tissue loss description Full thickness   Black (%), Wound Tissue Color 0 %   Red (%), Wound Tissue Color 70 %   Yellow (%), Wound Tissue Color 30 %   Periwound Area Intact;Dry   Wound Edges Defined   Wound Length (cm) 5 cm   Wound Width (cm) 5 cm   Wound Depth (cm) 3 cm   Wound Volume (cm^3) 75 cm^3   Wound Surface Area (cm^2) 25 cm^2   Care Cleansed with:;Antimicrobial agent  (vashe)   Dressing Changed  (NPWT set @125)   Dressing Change Due 10/29/24         WOCN follow up for wound vac change to right buttock. Discussed POC w/ nurse. Family at bedside. Had assistance from Jaime from wound care team w/ wound vac placement. Explained reason for visit. Wound vac dressing taken down-1 white foam and 1 black foam removed from wound. Wound vac changed to right buttock-cleaned w/ vashe, 1 white  foam, 1 black foam tracked to another black foam, seal intact w/ no leakage or blockage detected w/ setting at 125mmHg. Pt. Tolerated application change well with no signs of pain or discomfort. Nursing to cont. Tx recs and preventative measures. Will follow up on Tuesday for wound vac change. Pt. On an MARY mattress.          10/25/2024
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
## 2024-10-25 NOTE — PROGRESS NOTES
Infectious Diseases Progress Note  58 y/o male with past medical history of T-spine cord injury with paraplegia, diabetes, HTN, hepatitis-C, chronic MRSA L ankle wound/osteomyelitis, was on suppressive doxycycline and R knee infection/septic arthritis and osteomyelitis with GBS/Enterococcus and Ecoli isolates who presented to ER on 10/8 with increased generalized pain, increased foul smelling drainage from sacral wound with fever and chills. He was noted to be hypotensive per EMS. On admit he was afebrile without leukocytosis. ESR 98 and .80. MRSA PCR (-). U/A with 21-50 WBC, 0-5 RBC, 75 LE, no bacteria, negative nitrites. Urine culture negative. Blood cultures negative. Sacral wound culture with many Acinetobacter, many ESBL Ecoli, moderate Enterococcus, Bacteroides and Peptoniphilus. CT pelvis with contrast showed worsening inflammatory change of the sacral decubitus ulcers with gas now identified inferior to the right pubic ramus, stool noted throughout the colon as may be seen with constipation, pyelonephritis is not excluded. Seen by Surgery and underwent debridement of sacral wound. During his stay he was noted to have large amounts of bleeding from sacral wound. CT abdomen/pelvis on 10/17 showed interval development of L renal rupture with large subcapsular hematoma, L retroperitoneal hemorrhage. He received multiple blood/blood products over the past couple days. He was noted to be hypotensive and was transferred to ICU on 10/17. He was seen by Vascular and underwent Aortogram,  left renal angiography with coil embolization of left renal artery and right groin central venous line insertion on 10/17. Sputum culture on 108/23 revealing Pseudomonas. RUE venous ultrasound positive for deep  vein thrombosis in brachial and radial veins of the right upper extremity   He is currently on Unasyn and Cipro. Remains on chronic oral suppression with Doxycycline    Subjective:  Lying in bed, no acute distress.  Currently on 40% 20L Vapotherm. No new complaints reported. Afebrile.    Review of Systems   Constitutional:  Positive for malaise/fatigue.   HENT: Negative.     Eyes: Negative.    Respiratory:  Positive for shortness of breath.    Cardiovascular: Negative.    Gastrointestinal: Negative.    Musculoskeletal: Negative.    Skin: Negative.    Neurological:  Positive for focal weakness and weakness.   All other systems reviewed and are negative.      Review of patient's allergies indicates:   Allergen Reactions    Baclofen Itching and Anxiety       Past Medical History:   Diagnosis Date    Arthritis     Chronic ulcer of ankle 05/26/2022    ESBL (extended spectrum beta-lactamase) producing bacteria infection 08/30/2024    Frequent UTI 07/02/2019    Generalized anxiety disorder 05/26/2022    Neurogenic bladder 05/26/2022    Osteomyelitis 05/26/2022    Paraplegia     Presence of suprapubic catheter 05/26/2022    Pure hypercholesterolemia 05/26/2022    Retention of urine, unspecified 08/09/2019    Spinal cord injury at T1-T6 level 04/20/2018       Past Surgical History:   Procedure Laterality Date    ANGIOGRAM, RENAL ARTERIES, BILATERAL N/A 10/17/2024    Procedure: Angiogram, Renal Arteries, Bilateral;  Surgeon: Ptai King MD;  Location: [facility] CATH LAB;  Service: Peripheral Vascular;  Laterality: N/A;  left renal artery coiling    APPLICATION OF WOUND VACUUM-ASSISTED CLOSURE DEVICE N/A 10/10/2024    Procedure: APPLICATION, WOUND VAC;  Surgeon: Rob Sinclair MD;  Location: [facility] OR;  Service: General;  Laterality: N/A;    EGD, WITH CLOSED BIOPSY N/A 8/29/2024    Procedure: EGD, WITH CLOSED BIOPSY;  Surgeon: Mikal Segovia MD;  Location: [facility] ENDOSCOPY;  Service: Gastroenterology;  Laterality: N/A;    ESOPHAGOGASTRODUODENOSCOPY N/A 8/29/2024    Procedure: EGD;  Surgeon: Mikal Segovia MD;  Location: [facility] ENDOSCOPY;  Service: Gastroenterology;  Laterality: N/A;    FRACTURE SURGERY  2021    INCISION AND  DRAINAGE, LOWER EXTREMITY Right 2023    Procedure: INCISION AND DRAINAGE, LOWER EXTREMITY;  Surgeon: Prabhu Shen DO;  Location: [facility] OR;  Service: Orthopedics;  Laterality: Right;  supine bone foam wash stuff cultures    INCISION AND DRAINAGE, LOWER EXTREMITY Right 2023    Procedure: INCISION AND DRAINAGE, LOWER EXTREMITY;  Surgeon: Prabhu Shen DO;  Location: [facility] OR;  Service: Orthopedics;  Laterality: Right;  supine any table bone foam wash stuff possible wound vac    INCISION AND DRAINAGE, LOWER EXTREMITY Right 2023    Procedure: INCISION AND DRAINAGE, LOWER EXTREMITY;  Surgeon: Prabhu Shen DO;  Location: [facility] OR;  Service: Orthopedics;  Laterality: Right;  supine bone foam vascular bed removal of distal femoral plate, wash stuff, possible AKA    INSERTION OF INTRAMEDULLARY MARISA Right 08/10/2022    Procedure: INSERTION, INTRAMEDULLARY MARISA RIGHT TIBIA;  Surgeon: Jorge Orellana MD;  Location: [facility] OR;  Service: Orthopedics;  Laterality: Right;    JOINT REPLACEMENT      Ankel    PRESSURE ULCER DEBRIDEMENT N/A 10/10/2024    Procedure: DEBRIDEMENT, PRESSURE ULCER;  Surgeon: Rob Sinclair MD;  Location: [facility] OR;  Service: General;  Laterality: N/A;  sacral wound, R buttock wound    REMOVAL OF HARDWARE FROM LOWER EXTREMITY Right 2023    Procedure: REMOVAL, HARDWARE, LOWER EXTREMITY;  Surgeon: Prabhu Shen DO;  Location: [facility] OR;  Service: Orthopedics;  Laterality: Right;    SPINE SURGERY  2018       Social History     Socioeconomic History    Marital status:    Tobacco Use    Smoking status: Some Days     Current packs/day: 0.00     Average packs/day: 0.2 packs/day for 41.5 years (10.4 ttl pk-yrs)     Types: Cigars, Cigarettes     Start date: 1982     Last attempt to quit: 2023     Years since quittin.2    Smokeless tobacco: Never   Substance and Sexual Activity    Alcohol use: Not Currently     Alcohol/week: 2.0 standard drinks of alcohol     Types: 2  Cans of beer per week    Drug use: Not Currently     Types: Oxycodone    Sexual activity: Not Currently     Partners: Female     Birth control/protection: None     Social Drivers of Health     Financial Resource Strain: Low Risk  (10/10/2024)    Overall Financial Resource Strain (CARDIA)     Difficulty of Paying Living Expenses: Not hard at all   Food Insecurity: No Food Insecurity (10/10/2024)    Hunger Vital Sign     Worried About Running Out of Food in the Last Year: Never true     Ran Out of Food in the Last Year: Never true   Transportation Needs: No Transportation Needs (10/10/2024)    TRANSPORTATION NEEDS     Transportation : No   Physical Activity: Inactive (12/11/2023)    Exercise Vital Sign     Days of Exercise per Week: 0 days     Minutes of Exercise per Session: 0 min   Stress: No Stress Concern Present (10/10/2024)    [employer] - Occupational Stress Questionnaire     Feeling of Stress : Only a little   Housing Stability: Low Risk  (10/10/2024)    Housing Stability Vital Sign     Unable to Pay for Housing in the Last Year: No     Homeless in the Last Year: No       Scheduled Meds:   ampicillin-sulbactam  3 g Intravenous Q8H    atorvastatin  20 mg Per NG tube Nightly    calcium gluconate 1 g  1 g Intravenous Once    carvediloL  25 mg Oral BID    ciprofloxacin  400 mg Intravenous Q12H    doxycycline  100 mg Oral Q12H    fenofibrate  48 mg Per NG tube Daily    FLUoxetine  20 mg Per G Tube Daily    gabapentin  400 mg Per NG tube Q8H    mupirocin   Nasal BID    NIFEdipine  60 mg Oral Daily    oxybutynin  5 mg Per NG tube TID    pantoprazole  40 mg Intravenous Daily    senna-docusate 8.6-50 mg  1 tablet Oral BID    sodium chloride 0.9%  10 mL Intravenous Q6H    sodium chloride 0.9%  3 mL Nebulization TID     Continuous Infusions:      PRN Meds:  Current Facility-Administered Medications:     0.9%  NaCl infusion (for blood administration), , Intravenous, Q24H PRN    0.9%  NaCl  "infusion (for blood administration), , Intravenous, Q24H PRN    0.9%  NaCl infusion (for blood administration), , Intravenous, Q24H PRN    0.9%  NaCl infusion (for blood administration), , Intravenous, Q24H PRN    0.9%  NaCl infusion (for blood administration), , Intravenous, Q24H PRN    0.9%  NaCl infusion (for blood administration), , Intravenous, Q24H PRN    0.9%  NaCl infusion (for blood administration), , Intravenous, Q24H PRN    0.9%  NaCl infusion (for blood administration), , Intravenous, Q24H PRN    acetaminophen, 650 mg, Oral, Q4H PRN    albuterol-ipratropium, 3 mL, Nebulization, Q4H PRN    aluminum-magnesium hydroxide-simethicone, 30 mL, Oral, QID PRN    dextrose 10%, 12.5 g, Intravenous, PRN    dextrose 10%, 25 g, Intravenous, PRN    diphenhydrAMINE, 50 mg, Intravenous, Q6H PRN    etomidate, , Intravenous, Code/trauma/sedation Med    glucagon (human recombinant), 1 mg, Intramuscular, PRN    glucose, 16 g, Oral, PRN    glucose, 24 g, Oral, PRN    guaiFENesin 100 mg/5 ml, 200 mg, Oral, Q4H PRN    hydrALAZINE, 20 mg, Intravenous, Q4H PRN    hyoscyamine, 0.125 mg, Sublingual, Q4H PRN    insulin aspart U-100, 1-10 Units, Subcutaneous, QID (AC + HS) PRN    melatonin, 6 mg, Oral, Nightly PRN    methocarbamoL, 750 mg, Oral, TID PRN    morphine, 2 mg, Intravenous, BID PRN    ondansetron, 4 mg, Intravenous, Q4H PRN    oxyCODONE-acetaminophen, 1 tablet, Oral, Q4H PRN    polyethylene glycol, 17 g, Oral, BID PRN    prochlorperazine, 5 mg, Intravenous, Q6H PRN    rocuronium, , Intravenous, Code/trauma/sedation Med    sodium chloride 0.9%, 10 mL, Intravenous, PRN    Flushing PICC/Midline Protocol, , , Until Discontinued **AND** sodium chloride 0.9%, 10 mL, Intravenous, Q6H **AND** sodium chloride 0.9%, 10 mL, Intravenous, PRN    traZODone, 200 mg, Oral, Nightly PRN    Objective:  /74   Pulse 95   Temp 97.9 °F (36.6 °C) (Oral)   Resp 18   Ht 5' 10" (1.778 m)   Wt 79.3 kg (174 lb 13.2 oz)   SpO2 (!) 94%   " BMI 25.08 kg/m²     Physical Exam:   Physical Exam  Vitals reviewed.   HENT:      Head: Normocephalic.   Cardiovascular:      Rate and Rhythm: Normal rate and regular rhythm.   Pulmonary:      Effort: Pulmonary effort is normal. No respiratory distress.      Breath sounds: Normal breath sounds. No wheezing.   Abdominal:      General: Bowel sounds are normal. There is no distension.      Palpations: Abdomen is soft.      Tenderness: There is no abdominal tenderness.   Genitourinary:     Comments: Suprapubic catheter  Musculoskeletal:      Cervical back: Normal range of motion.      Comments: Paraplegia   Skin:     Findings: No rash.      Comments: Wounds dressed   Neurological:      Mental Status: He is alert and oriented to person, place, and time.   Psychiatric:         Behavior: Behavior normal.     Imaging    Lab Review   Recent Results (from the past 24 hours)   POCT glucose    Collection Time: 10/24/24  3:54 PM   Result Value Ref Range    POCT Glucose 99 70 - 110 mg/dL   POCT glucose    Collection Time: 10/24/24 10:44 PM   Result Value Ref Range    POCT Glucose 100 70 - 110 mg/dL       Assessment/Plan:  Sepsis - resolved  Sacral wound infection - CR Acinetobacter / ESBL Ecoli / Enterococcus / Bacteroides / Peptoniphilus isolates  Pneumonitis / Pleural effusions  Pseudomonas (+) sputum  ANTON  Pyelonephritis per CT   Constipation  Paraplegia  Neurogenic bladder with suprapubic catheter  DM Type II  MRSA L ankle osteomyelitis with retained hardware  Anemia    -Continue Unasyn #12 and Cipro #12  -Has been unable to get MRI R hip due to clinical status. Will extend antibiotics course another 2 weeks with end date 11/11  -Remains afebrile with leukocytosis resolved  -Currently on 40% 20L Vapotherm, wean as tolerated  -CXR on 10/21 with B/L pleural effusions with associated atelectasis and/or infiltrate  -Sputum culture on 10/23 with moderate presumptive Pseudomonas species isolated, follow  -Pt with significant  anemia, CT abdomen/pelvis showed L renal rupture with large subcapsular hematoma  -Seen by Vascular, inputs noted. S/P aortogram, left renal angiography with coil embolization of left renal artery on 10/17  -Received multiple units of blood/blood products during his stay  -CT pelvis with contrast showed worsening inflammatory change of sacral ulcer with gas inferior to the R pubic ramus  -Seen by Surgery, inputs noted  -S/P debridement of sacral wound on 10/10  -Sacral wound cultures revealing CR Acinetobacter, ESBL Ecoli, Enterococcus, Bacteroides, Peptoniphilus isolates  -Continue wound care  -Blood cultures negative   -Discussed with patient, wife and nursing staff. Pt is now a DNR. CM working on LTAC placement

## 2024-10-26 LAB
ABO + RH BLD: NORMAL
ANION GAP SERPL CALC-SCNC: 7 MEQ/L
BACTERIA SPEC CULT: ABNORMAL
BASOPHILS # BLD AUTO: 0.04 X10(3)/MCL
BASOPHILS NFR BLD AUTO: 0.4 %
BLD PROD TYP BPU: NORMAL
BLOOD UNIT EXPIRATION DATE: NORMAL
BLOOD UNIT TYPE CODE: 5100
BUN SERPL-MCNC: 18.2 MG/DL (ref 8.4–25.7)
CALCIUM SERPL-MCNC: 8.1 MG/DL (ref 8.8–10)
CHLORIDE SERPL-SCNC: 102 MMOL/L (ref 98–107)
CO2 SERPL-SCNC: 29 MMOL/L (ref 23–31)
CREAT SERPL-MCNC: 1.33 MG/DL (ref 0.72–1.25)
CREAT/UREA NIT SERPL: 14
CROSSMATCH INTERPRETATION: NORMAL
DISPENSE STATUS: NORMAL
EOSINOPHIL # BLD AUTO: 0.07 X10(3)/MCL (ref 0–0.9)
EOSINOPHIL NFR BLD AUTO: 0.6 %
ERYTHROCYTE [DISTWIDTH] IN BLOOD BY AUTOMATED COUNT: 15.7 % (ref 11.5–17)
GFR SERPLBLD CREATININE-BSD FMLA CKD-EPI: >60 ML/MIN/1.73/M2
GLUCOSE SERPL-MCNC: 127 MG/DL (ref 82–115)
GRAM STN SPEC: ABNORMAL
GROUP & RH: NORMAL
HCT VFR BLD AUTO: 21.5 % (ref 42–52)
HGB BLD-MCNC: 7 G/DL (ref 14–18)
IMM GRANULOCYTES # BLD AUTO: 0.22 X10(3)/MCL (ref 0–0.04)
IMM GRANULOCYTES NFR BLD AUTO: 2 %
INDIRECT COOMBS: NORMAL
LYMPHOCYTES # BLD AUTO: 1.6 X10(3)/MCL (ref 0.6–4.6)
LYMPHOCYTES NFR BLD AUTO: 14.4 %
MCH RBC QN AUTO: 28.8 PG (ref 27–31)
MCHC RBC AUTO-ENTMCNC: 32.6 G/DL (ref 33–36)
MCV RBC AUTO: 88.5 FL (ref 80–94)
MONOCYTES # BLD AUTO: 1.33 X10(3)/MCL (ref 0.1–1.3)
MONOCYTES NFR BLD AUTO: 12 %
NEUTROPHILS # BLD AUTO: 7.85 X10(3)/MCL (ref 2.1–9.2)
NEUTROPHILS NFR BLD AUTO: 70.6 %
NRBC BLD AUTO-RTO: 0.5 %
PLATELET # BLD AUTO: 411 X10(3)/MCL (ref 130–400)
PMV BLD AUTO: 9.3 FL (ref 7.4–10.4)
POCT GLUCOSE: 174 MG/DL (ref 70–110)
POCT GLUCOSE: 80 MG/DL (ref 70–110)
POCT GLUCOSE: 86 MG/DL (ref 70–110)
POTASSIUM SERPL-SCNC: 3.4 MMOL/L (ref 3.5–5.1)
RBC # BLD AUTO: 2.43 X10(6)/MCL (ref 4.7–6.1)
SODIUM SERPL-SCNC: 138 MMOL/L (ref 136–145)
SPECIMEN OUTDATE: NORMAL
UNIT NUMBER: NORMAL
WBC # BLD AUTO: 11.11 X10(3)/MCL (ref 4.5–11.5)

## 2024-10-26 PROCEDURE — A4216 STERILE WATER/SALINE, 10 ML: HCPCS | Performed by: STUDENT IN AN ORGANIZED HEALTH CARE EDUCATION/TRAINING PROGRAM

## 2024-10-26 PROCEDURE — 94760 N-INVAS EAR/PLS OXIMETRY 1: CPT

## 2024-10-26 PROCEDURE — 25000003 PHARM REV CODE 250: Performed by: NURSE PRACTITIONER

## 2024-10-26 PROCEDURE — 99900035 HC TECH TIME PER 15 MIN (STAT)

## 2024-10-26 PROCEDURE — 27000646 HC AEROBIKA DEVICE

## 2024-10-26 PROCEDURE — 27000207 HC ISOLATION

## 2024-10-26 PROCEDURE — 63600175 PHARM REV CODE 636 W HCPCS: Performed by: INTERNAL MEDICINE

## 2024-10-26 PROCEDURE — 25000003 PHARM REV CODE 250: Performed by: STUDENT IN AN ORGANIZED HEALTH CARE EDUCATION/TRAINING PROGRAM

## 2024-10-26 PROCEDURE — 25000003 PHARM REV CODE 250: Performed by: INTERNAL MEDICINE

## 2024-10-26 PROCEDURE — P9016 RBC LEUKOCYTES REDUCED: HCPCS | Performed by: INTERNAL MEDICINE

## 2024-10-26 PROCEDURE — 86901 BLOOD TYPING SEROLOGIC RH(D): CPT | Performed by: INTERNAL MEDICINE

## 2024-10-26 PROCEDURE — 21400001 HC TELEMETRY ROOM

## 2024-10-26 PROCEDURE — 94664 DEMO&/EVAL PT USE INHALER: CPT

## 2024-10-26 PROCEDURE — 63600175 PHARM REV CODE 636 W HCPCS: Performed by: STUDENT IN AN ORGANIZED HEALTH CARE EDUCATION/TRAINING PROGRAM

## 2024-10-26 PROCEDURE — 94640 AIRWAY INHALATION TREATMENT: CPT

## 2024-10-26 PROCEDURE — 85025 COMPLETE CBC W/AUTO DIFF WBC: CPT | Performed by: INTERNAL MEDICINE

## 2024-10-26 PROCEDURE — 86923 COMPATIBILITY TEST ELECTRIC: CPT | Performed by: INTERNAL MEDICINE

## 2024-10-26 PROCEDURE — 27100171 HC OXYGEN HIGH FLOW UP TO 24 HOURS

## 2024-10-26 PROCEDURE — 36415 COLL VENOUS BLD VENIPUNCTURE: CPT | Performed by: INTERNAL MEDICINE

## 2024-10-26 PROCEDURE — 94761 N-INVAS EAR/PLS OXIMETRY MLT: CPT

## 2024-10-26 PROCEDURE — 80048 BASIC METABOLIC PNL TOTAL CA: CPT | Performed by: INTERNAL MEDICINE

## 2024-10-26 PROCEDURE — 25000242 PHARM REV CODE 250 ALT 637 W/ HCPCS: Performed by: INTERNAL MEDICINE

## 2024-10-26 PROCEDURE — 94799 UNLISTED PULMONARY SVC/PX: CPT

## 2024-10-26 RX ORDER — HYDROCODONE BITARTRATE AND ACETAMINOPHEN 500; 5 MG/1; MG/1
TABLET ORAL
Status: DISCONTINUED | OUTPATIENT
Start: 2024-10-26 | End: 2024-11-26 | Stop reason: HOSPADM

## 2024-10-26 RX ORDER — POTASSIUM CHLORIDE 20 MEQ/1
40 TABLET, EXTENDED RELEASE ORAL ONCE
Status: COMPLETED | OUTPATIENT
Start: 2024-10-26 | End: 2024-10-26

## 2024-10-26 RX ADMIN — ISODIUM CHLORIDE 3 ML: 0.03 SOLUTION RESPIRATORY (INHALATION) at 08:10

## 2024-10-26 RX ADMIN — GABAPENTIN 400 MG: 300 CAPSULE ORAL at 06:10

## 2024-10-26 RX ADMIN — DOXYCYCLINE HYCLATE 100 MG: 100 TABLET, COATED ORAL at 10:10

## 2024-10-26 RX ADMIN — METHOCARBAMOL 750 MG: 750 TABLET ORAL at 06:10

## 2024-10-26 RX ADMIN — MORPHINE SULFATE 2 MG: 4 INJECTION, SOLUTION INTRAMUSCULAR; INTRAVENOUS at 10:10

## 2024-10-26 RX ADMIN — POTASSIUM CHLORIDE 40 MEQ: 1500 TABLET, EXTENDED RELEASE ORAL at 10:10

## 2024-10-26 RX ADMIN — OXYBUTYNIN CHLORIDE 5 MG: 5 TABLET ORAL at 09:10

## 2024-10-26 RX ADMIN — FLUOXETINE HYDROCHLORIDE 20 MG: 20 CAPSULE ORAL at 10:10

## 2024-10-26 RX ADMIN — NIFEDIPINE 60 MG: 60 TABLET, FILM COATED, EXTENDED RELEASE ORAL at 10:10

## 2024-10-26 RX ADMIN — SODIUM CHLORIDE, PRESERVATIVE FREE 10 ML: 5 INJECTION INTRAVENOUS at 05:10

## 2024-10-26 RX ADMIN — OXYBUTYNIN CHLORIDE 5 MG: 5 TABLET ORAL at 10:10

## 2024-10-26 RX ADMIN — INSULIN ASPART 2 UNITS: 100 INJECTION, SOLUTION INTRAVENOUS; SUBCUTANEOUS at 10:10

## 2024-10-26 RX ADMIN — SENNOSIDES AND DOCUSATE SODIUM 1 TABLET: 50; 8.6 TABLET ORAL at 10:10

## 2024-10-26 RX ADMIN — AMPICILLIN SODIUM AND SULBACTAM SODIUM 3 G: 2; 1 INJECTION, POWDER, FOR SOLUTION INTRAMUSCULAR; INTRAVENOUS at 06:10

## 2024-10-26 RX ADMIN — PANTOPRAZOLE SODIUM 40 MG: 40 INJECTION, POWDER, LYOPHILIZED, FOR SOLUTION INTRAVENOUS at 10:10

## 2024-10-26 RX ADMIN — OXYCODONE AND ACETAMINOPHEN 1 TABLET: 10; 325 TABLET ORAL at 06:10

## 2024-10-26 RX ADMIN — CIPROFLOXACIN 400 MG: 2 INJECTION, SOLUTION INTRAVENOUS at 10:10

## 2024-10-26 RX ADMIN — GABAPENTIN 400 MG: 300 CAPSULE ORAL at 09:10

## 2024-10-26 RX ADMIN — ISODIUM CHLORIDE 3 ML: 0.03 SOLUTION RESPIRATORY (INHALATION) at 09:10

## 2024-10-26 RX ADMIN — DOXYCYCLINE HYCLATE 100 MG: 100 TABLET, COATED ORAL at 09:10

## 2024-10-26 RX ADMIN — SODIUM CHLORIDE, PRESERVATIVE FREE 10 ML: 5 INJECTION INTRAVENOUS at 12:10

## 2024-10-26 RX ADMIN — SODIUM CHLORIDE, PRESERVATIVE FREE 10 ML: 5 INJECTION INTRAVENOUS at 06:10

## 2024-10-26 RX ADMIN — AMPICILLIN SODIUM AND SULBACTAM SODIUM 3 G: 2; 1 INJECTION, POWDER, FOR SOLUTION INTRAMUSCULAR; INTRAVENOUS at 09:10

## 2024-10-26 RX ADMIN — AMPICILLIN SODIUM AND SULBACTAM SODIUM 3 G: 2; 1 INJECTION, POWDER, FOR SOLUTION INTRAMUSCULAR; INTRAVENOUS at 05:10

## 2024-10-26 RX ADMIN — SENNOSIDES AND DOCUSATE SODIUM 1 TABLET: 50; 8.6 TABLET ORAL at 09:10

## 2024-10-26 RX ADMIN — OXYBUTYNIN CHLORIDE 5 MG: 5 TABLET ORAL at 03:10

## 2024-10-26 RX ADMIN — TRAZODONE HYDROCHLORIDE 200 MG: 100 TABLET ORAL at 10:10

## 2024-10-26 RX ADMIN — CIPROFLOXACIN 400 MG: 2 INJECTION, SOLUTION INTRAVENOUS at 11:10

## 2024-10-26 RX ADMIN — OXYCODONE AND ACETAMINOPHEN 1 TABLET: 10; 325 TABLET ORAL at 03:10

## 2024-10-26 RX ADMIN — ATORVASTATIN CALCIUM 20 MG: 10 TABLET, FILM COATED ORAL at 09:10

## 2024-10-26 RX ADMIN — SODIUM CHLORIDE, PRESERVATIVE FREE 10 ML: 5 INJECTION INTRAVENOUS at 01:10

## 2024-10-26 RX ADMIN — IPRATROPIUM BROMIDE AND ALBUTEROL SULFATE 3 ML: 2.5; .5 SOLUTION RESPIRATORY (INHALATION) at 08:10

## 2024-10-26 RX ADMIN — OXYCODONE AND ACETAMINOPHEN 1 TABLET: 10; 325 TABLET ORAL at 10:10

## 2024-10-26 RX ADMIN — CARVEDILOL 25 MG: 12.5 TABLET, FILM COATED ORAL at 09:10

## 2024-10-26 RX ADMIN — FENOFIBRATE 48 MG: 48 TABLET, FILM COATED ORAL at 10:10

## 2024-10-26 RX ADMIN — CARVEDILOL 25 MG: 12.5 TABLET, FILM COATED ORAL at 10:10

## 2024-10-26 NOTE — PROGRESS NOTES
Infectious Diseases Progress Note  58 y/o male with past medical history of T-spine cord injury with paraplegia, diabetes, HTN, hepatitis-C, chronic MRSA L ankle wound/osteomyelitis, was on suppressive doxycycline and R knee infection/septic arthritis and osteomyelitis with GBS/Enterococcus and Ecoli isolates who presented to ER on 10/8 with increased generalized pain, increased foul smelling drainage from sacral wound with fever and chills. He was noted to be hypotensive per EMS. On admit he was afebrile without leukocytosis. ESR 98 and .80. MRSA PCR (-). U/A with 21-50 WBC, 0-5 RBC, 75 LE, no bacteria, negative nitrites. Urine culture negative. Blood cultures negative. Sacral wound culture with many Acinetobacter, many ESBL Ecoli, moderate Enterococcus, Bacteroides and Peptoniphilus. CT pelvis with contrast showed worsening inflammatory change of the sacral decubitus ulcers with gas now identified inferior to the right pubic ramus, stool noted throughout the colon as may be seen with constipation, pyelonephritis is not excluded. Seen by Surgery and underwent debridement of sacral wound. During his stay he was noted to have large amounts of bleeding from sacral wound. CT abdomen/pelvis on 10/17 showed interval development of L renal rupture with large subcapsular hematoma, L retroperitoneal hemorrhage. He received multiple blood/blood products over the past couple days. He was noted to be hypotensive and was transferred to ICU on 10/17. He was seen by Vascular and underwent Aortogram,  left renal angiography with coil embolization of left renal artery and right groin central venous line insertion on 10/17. Sputum culture on 108/23 revealing Pseudomonas. RUE venous ultrasound positive for deep  vein thrombosis in brachial and radial veins of the right upper extremity   He is currently on Unasyn and Cipro. Remains on chronic oral suppression with Doxycycline    Subjective:  Lying in bed, no acute distress.  Currently on 80% 25L Vapotherm. No new complaints reported. Afebrile.    Review of Systems   Constitutional:  Positive for malaise/fatigue.   HENT: Negative.     Eyes: Negative.    Respiratory:  Positive for shortness of breath.    Cardiovascular: Negative.    Gastrointestinal: Negative.    Musculoskeletal: Negative.    Skin: Negative.    Neurological:  Positive for focal weakness and weakness.   All other systems reviewed and are negative.      Review of patient's allergies indicates:   Allergen Reactions    Baclofen Itching and Anxiety       Past Medical History:   Diagnosis Date    Arthritis     Chronic ulcer of ankle 05/26/2022    ESBL (extended spectrum beta-lactamase) producing bacteria infection 08/30/2024    Frequent UTI 07/02/2019    Generalized anxiety disorder 05/26/2022    Neurogenic bladder 05/26/2022    Osteomyelitis 05/26/2022    Paraplegia     Presence of suprapubic catheter 05/26/2022    Pure hypercholesterolemia 05/26/2022    Retention of urine, unspecified 08/09/2019    Spinal cord injury at T1-T6 level 04/20/2018       Past Surgical History:   Procedure Laterality Date    ANGIOGRAM, RENAL ARTERIES, BILATERAL N/A 10/17/2024    Procedure: Angiogram, Renal Arteries, Bilateral;  Surgeon: Pati King MD;  Location: [facility] CATH LAB;  Service: Peripheral Vascular;  Laterality: N/A;  left renal artery coiling    APPLICATION OF WOUND VACUUM-ASSISTED CLOSURE DEVICE N/A 10/10/2024    Procedure: APPLICATION, WOUND VAC;  Surgeon: Rob Sinclair MD;  Location: [facility] OR;  Service: General;  Laterality: N/A;    EGD, WITH CLOSED BIOPSY N/A 8/29/2024    Procedure: EGD, WITH CLOSED BIOPSY;  Surgeon: Mikal Segovia MD;  Location: [facility] ENDOSCOPY;  Service: Gastroenterology;  Laterality: N/A;    ESOPHAGOGASTRODUODENOSCOPY N/A 8/29/2024    Procedure: EGD;  Surgeon: Mikal Segovia MD;  Location: [facility] ENDOSCOPY;  Service: Gastroenterology;  Laterality: N/A;    FRACTURE SURGERY  2021    INCISION AND  DRAINAGE, LOWER EXTREMITY Right 2023    Procedure: INCISION AND DRAINAGE, LOWER EXTREMITY;  Surgeon: Prabhu Shen DO;  Location: [facility] OR;  Service: Orthopedics;  Laterality: Right;  supine bone foam wash stuff cultures    INCISION AND DRAINAGE, LOWER EXTREMITY Right 2023    Procedure: INCISION AND DRAINAGE, LOWER EXTREMITY;  Surgeon: Prabhu Shen DO;  Location: [facility] OR;  Service: Orthopedics;  Laterality: Right;  supine any table bone foam wash stuff possible wound vac    INCISION AND DRAINAGE, LOWER EXTREMITY Right 2023    Procedure: INCISION AND DRAINAGE, LOWER EXTREMITY;  Surgeon: Prabhu Shen DO;  Location: [facility] OR;  Service: Orthopedics;  Laterality: Right;  supine bone foam vascular bed removal of distal femoral plate, wash stuff, possible AKA    INSERTION OF INTRAMEDULLARY MARISA Right 08/10/2022    Procedure: INSERTION, INTRAMEDULLARY MARISA RIGHT TIBIA;  Surgeon: Jorge Orellana MD;  Location: [facility] OR;  Service: Orthopedics;  Laterality: Right;    JOINT REPLACEMENT      Ankel    PRESSURE ULCER DEBRIDEMENT N/A 10/10/2024    Procedure: DEBRIDEMENT, PRESSURE ULCER;  Surgeon: Rob Sinclair MD;  Location: [facility] OR;  Service: General;  Laterality: N/A;  sacral wound, R buttock wound    REMOVAL OF HARDWARE FROM LOWER EXTREMITY Right 2023    Procedure: REMOVAL, HARDWARE, LOWER EXTREMITY;  Surgeon: Prabhu Shen DO;  Location: [facility] OR;  Service: Orthopedics;  Laterality: Right;    SPINE SURGERY  2018       Social History     Socioeconomic History    Marital status:    Tobacco Use    Smoking status: Some Days     Current packs/day: 0.00     Average packs/day: 0.2 packs/day for 41.5 years (10.4 ttl pk-yrs)     Types: Cigars, Cigarettes     Start date: 1982     Last attempt to quit: 2023     Years since quittin.2    Smokeless tobacco: Never   Substance and Sexual Activity    Alcohol use: Not Currently     Alcohol/week: 2.0 standard drinks of alcohol     Types: 2  Cans of beer per week    Drug use: Not Currently     Types: Oxycodone    Sexual activity: Not Currently     Partners: Female     Birth control/protection: None     Social Drivers of Health     Financial Resource Strain: Low Risk  (10/10/2024)    Overall Financial Resource Strain (CARDIA)     Difficulty of Paying Living Expenses: Not hard at all   Food Insecurity: No Food Insecurity (10/10/2024)    Hunger Vital Sign     Worried About Running Out of Food in the Last Year: Never true     Ran Out of Food in the Last Year: Never true   Transportation Needs: No Transportation Needs (10/10/2024)    TRANSPORTATION NEEDS     Transportation : No   Physical Activity: Inactive (12/11/2023)    Exercise Vital Sign     Days of Exercise per Week: 0 days     Minutes of Exercise per Session: 0 min   Stress: No Stress Concern Present (10/10/2024)    [employer] - Occupational Stress Questionnaire     Feeling of Stress : Only a little   Housing Stability: Low Risk  (10/10/2024)    Housing Stability Vital Sign     Unable to Pay for Housing in the Last Year: No     Homeless in the Last Year: No       Scheduled Meds:   ampicillin-sulbactam  3 g Intravenous Q8H    atorvastatin  20 mg Per NG tube Nightly    calcium gluconate 1 g  1 g Intravenous Once    carvediloL  25 mg Oral BID    ciprofloxacin  400 mg Intravenous Q12H    doxycycline  100 mg Oral Q12H    fenofibrate  48 mg Per NG tube Daily    FLUoxetine  20 mg Per G Tube Daily    gabapentin  400 mg Per NG tube Q8H    NIFEdipine  60 mg Oral Daily    oxybutynin  5 mg Per NG tube TID    pantoprazole  40 mg Intravenous Daily    potassium chloride  40 mEq Oral Once    senna-docusate 8.6-50 mg  1 tablet Oral BID    sodium chloride 0.9%  10 mL Intravenous Q6H    sodium chloride 0.9%  3 mL Nebulization TID     Continuous Infusions:      PRN Meds:  Current Facility-Administered Medications:     0.9%  NaCl infusion (for blood administration), , Intravenous, Q24H PRN     "0.9%  NaCl infusion (for blood administration), , Intravenous, Q24H PRN    0.9%  NaCl infusion (for blood administration), , Intravenous, Q24H PRN    0.9%  NaCl infusion (for blood administration), , Intravenous, Q24H PRN    0.9%  NaCl infusion (for blood administration), , Intravenous, Q24H PRN    0.9%  NaCl infusion (for blood administration), , Intravenous, Q24H PRN    0.9%  NaCl infusion (for blood administration), , Intravenous, Q24H PRN    0.9%  NaCl infusion (for blood administration), , Intravenous, Q24H PRN    0.9%  NaCl infusion (for blood administration), , Intravenous, Q24H PRN    acetaminophen, 650 mg, Oral, Q4H PRN    albuterol-ipratropium, 3 mL, Nebulization, Q4H PRN    aluminum-magnesium hydroxide-simethicone, 30 mL, Oral, QID PRN    dextrose 10%, 12.5 g, Intravenous, PRN    dextrose 10%, 25 g, Intravenous, PRN    diphenhydrAMINE, 50 mg, Intravenous, Q6H PRN    etomidate, , Intravenous, Code/trauma/sedation Med    glucagon (human recombinant), 1 mg, Intramuscular, PRN    glucose, 16 g, Oral, PRN    glucose, 24 g, Oral, PRN    guaiFENesin 100 mg/5 ml, 200 mg, Oral, Q4H PRN    hydrALAZINE, 20 mg, Intravenous, Q4H PRN    hyoscyamine, 0.125 mg, Sublingual, Q4H PRN    insulin aspart U-100, 1-10 Units, Subcutaneous, QID (AC + HS) PRN    methocarbamoL, 750 mg, Oral, TID PRN    morphine, 2 mg, Intravenous, BID PRN    ondansetron, 4 mg, Intravenous, Q4H PRN    oxyCODONE-acetaminophen, 1 tablet, Oral, Q4H PRN    polyethylene glycol, 17 g, Oral, BID PRN    prochlorperazine, 5 mg, Intravenous, Q6H PRN    rocuronium, , Intravenous, Code/trauma/sedation Med    sodium chloride 0.9%, 10 mL, Intravenous, PRN    Flushing PICC/Midline Protocol, , , Until Discontinued **AND** sodium chloride 0.9%, 10 mL, Intravenous, Q6H **AND** sodium chloride 0.9%, 10 mL, Intravenous, PRN    traZODone, 200 mg, Oral, Nightly PRN    Objective:  /63   Pulse 91   Temp 98.5 °F (36.9 °C) (Oral)   Resp 20   Ht 5' 10" (1.778 m)   " Wt 79.3 kg (174 lb 13.2 oz)   SpO2 98%   BMI 25.08 kg/m²     Physical Exam:   Physical Exam  Vitals reviewed.   HENT:      Head: Normocephalic.   Cardiovascular:      Rate and Rhythm: Normal rate and regular rhythm.   Pulmonary:      Effort: Pulmonary effort is normal. No respiratory distress.      Breath sounds: Normal breath sounds. No wheezing.   Abdominal:      General: Bowel sounds are normal. There is no distension.      Palpations: Abdomen is soft.      Tenderness: There is no abdominal tenderness.   Genitourinary:     Comments: Suprapubic catheter  Musculoskeletal:      Cervical back: Normal range of motion.      Comments: Paraplegia   Skin:     Findings: No rash.      Comments: Wounds dressed   Neurological:      Mental Status: He is alert and oriented to person, place, and time.   Psychiatric:         Behavior: Behavior normal.     Imaging    Lab Review   Recent Results (from the past 24 hours)   CBC with Differential    Collection Time: 10/25/24  8:36 PM   Result Value Ref Range    WBC 11.36 4.50 - 11.50 x10(3)/mcL    RBC 2.50 (L) 4.70 - 6.10 x10(6)/mcL    Hgb 7.1 (L) 14.0 - 18.0 g/dL    Hct 21.9 (L) 42.0 - 52.0 %    MCV 87.6 80.0 - 94.0 fL    MCH 28.4 27.0 - 31.0 pg    MCHC 32.4 (L) 33.0 - 36.0 g/dL    RDW 15.6 11.5 - 17.0 %    Platelet 403 (H) 130 - 400 x10(3)/mcL    MPV 9.1 7.4 - 10.4 fL    Neut % 70.0 %    Lymph % 15.9 %    Mono % 11.1 %    Eos % 0.5 %    Basophil % 0.3 %    Lymph # 1.81 0.6 - 4.6 x10(3)/mcL    Neut # 7.95 2.1 - 9.2 x10(3)/mcL    Mono # 1.26 0.1 - 1.3 x10(3)/mcL    Eos # 0.06 0 - 0.9 x10(3)/mcL    Baso # 0.03 <=0.2 x10(3)/mcL    IG# 0.25 (H) 0 - 0.04 x10(3)/mcL    IG% 2.2 %    NRBC% 0.9 %   POCT glucose    Collection Time: 10/25/24  9:09 PM   Result Value Ref Range    POCT Glucose 171 (H) 70 - 110 mg/dL   Basic Metabolic Panel    Collection Time: 10/26/24  4:45 AM   Result Value Ref Range    Sodium 138 136 - 145 mmol/L    Potassium 3.4 (L) 3.5 - 5.1 mmol/L    Chloride 102 98 - 107  mmol/L    CO2 29 23 - 31 mmol/L    Glucose 127 (H) 82 - 115 mg/dL    Blood Urea Nitrogen 18.2 8.4 - 25.7 mg/dL    Creatinine 1.33 (H) 0.72 - 1.25 mg/dL    BUN/Creatinine Ratio 14     Calcium 8.1 (L) 8.8 - 10.0 mg/dL    Anion Gap 7.0 mEq/L    eGFR >60 mL/min/1.73/m2   CBC with Differential    Collection Time: 10/26/24  4:45 AM   Result Value Ref Range    WBC 11.11 4.50 - 11.50 x10(3)/mcL    RBC 2.43 (L) 4.70 - 6.10 x10(6)/mcL    Hgb 7.0 (L) 14.0 - 18.0 g/dL    Hct 21.5 (L) 42.0 - 52.0 %    MCV 88.5 80.0 - 94.0 fL    MCH 28.8 27.0 - 31.0 pg    MCHC 32.6 (L) 33.0 - 36.0 g/dL    RDW 15.7 11.5 - 17.0 %    Platelet 411 (H) 130 - 400 x10(3)/mcL    MPV 9.3 7.4 - 10.4 fL    Neut % 70.6 %    Lymph % 14.4 %    Mono % 12.0 %    Eos % 0.6 %    Basophil % 0.4 %    Lymph # 1.60 0.6 - 4.6 x10(3)/mcL    Neut # 7.85 2.1 - 9.2 x10(3)/mcL    Mono # 1.33 (H) 0.1 - 1.3 x10(3)/mcL    Eos # 0.07 0 - 0.9 x10(3)/mcL    Baso # 0.04 <=0.2 x10(3)/mcL    IG# 0.22 (H) 0 - 0.04 x10(3)/mcL    IG% 2.0 %    NRBC% 0.5 %       Assessment/Plan:  Sepsis - resolved  Sacral wound infection - CR Acinetobacter / ESBL Ecoli / Enterococcus / Bacteroides / Peptoniphilus isolates  Pneumonitis / Pleural effusions  Pseudomonas (+) sputum  ANTON  Pyelonephritis per CT   Constipation  Paraplegia  Neurogenic bladder with suprapubic catheter  DM Type II  MRSA L ankle osteomyelitis with retained hardware  Anemia    -Continue Unasyn #13 and Cipro #13  -Has been unable to get MRI R hip due to clinical status. Will extend antibiotics course another 2 weeks with end date 11/11  -Remains afebrile with leukocytosis resolved  -Currently on 80% 25L Vapotherm, wean as tolerated  -Repeat CXR today 10/26 with pulmonary edema, bilateral effusions, lower lobe atelectasis unchanged.   -CXR on 10/21 with B/L pleural effusions with associated atelectasis and/or infiltrate  -Sputum culture on 10/23 with moderate Pseudomonas species, follow  -Pt with significant anemia, CT abdomen/pelvis  showed L renal rupture with large subcapsular hematoma  -Seen by Vascular, inputs noted. S/P aortogram, left renal angiography with coil embolization of left renal artery on 10/17  -Received multiple units of blood/blood products during his stay  -CT pelvis with contrast showed worsening inflammatory change of sacral ulcer with gas inferior to the R pubic ramus  -Seen by Surgery, inputs noted  -S/P debridement of sacral wound on 10/10  -Sacral wound cultures revealing CR Acinetobacter, ESBL Ecoli, Enterococcus, Bacteroides, Peptoniphilus isolates  -Continue wound care  -Blood cultures negative   -Discussed with patient, wife and nursing staff. Pt is now a DNR. CM working on LTAC placement
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
## 2024-10-27 LAB
ANION GAP SERPL CALC-SCNC: 9 MEQ/L
BASOPHILS # BLD AUTO: 0.04 X10(3)/MCL
BASOPHILS NFR BLD AUTO: 0.3 %
BUN SERPL-MCNC: 14.7 MG/DL (ref 8.4–25.7)
CALCIUM SERPL-MCNC: 8.1 MG/DL (ref 8.8–10)
CHLORIDE SERPL-SCNC: 104 MMOL/L (ref 98–107)
CO2 SERPL-SCNC: 26 MMOL/L (ref 23–31)
CREAT SERPL-MCNC: 1.23 MG/DL (ref 0.72–1.25)
CREAT/UREA NIT SERPL: 12
EOSINOPHIL # BLD AUTO: 0.08 X10(3)/MCL (ref 0–0.9)
EOSINOPHIL NFR BLD AUTO: 0.6 %
ERYTHROCYTE [DISTWIDTH] IN BLOOD BY AUTOMATED COUNT: 15.9 % (ref 11.5–17)
GFR SERPLBLD CREATININE-BSD FMLA CKD-EPI: >60 ML/MIN/1.73/M2
GLUCOSE SERPL-MCNC: 84 MG/DL (ref 82–115)
HCT VFR BLD AUTO: 27.7 % (ref 42–52)
HGB BLD-MCNC: 8.8 G/DL (ref 14–18)
IMM GRANULOCYTES # BLD AUTO: 0.17 X10(3)/MCL (ref 0–0.04)
IMM GRANULOCYTES NFR BLD AUTO: 1.4 %
LYMPHOCYTES # BLD AUTO: 1.43 X10(3)/MCL (ref 0.6–4.6)
LYMPHOCYTES NFR BLD AUTO: 11.6 %
MCH RBC QN AUTO: 27.9 PG (ref 27–31)
MCHC RBC AUTO-ENTMCNC: 31.8 G/DL (ref 33–36)
MCV RBC AUTO: 87.9 FL (ref 80–94)
MONOCYTES # BLD AUTO: 1.38 X10(3)/MCL (ref 0.1–1.3)
MONOCYTES NFR BLD AUTO: 11.2 %
NEUTROPHILS # BLD AUTO: 9.23 X10(3)/MCL (ref 2.1–9.2)
NEUTROPHILS NFR BLD AUTO: 74.9 %
NRBC BLD AUTO-RTO: 0.2 %
PLATELET # BLD AUTO: 486 X10(3)/MCL (ref 130–400)
PMV BLD AUTO: 9.1 FL (ref 7.4–10.4)
POCT GLUCOSE: 105 MG/DL (ref 70–110)
POCT GLUCOSE: 121 MG/DL (ref 70–110)
POCT GLUCOSE: 124 MG/DL (ref 70–110)
POTASSIUM SERPL-SCNC: 4.1 MMOL/L (ref 3.5–5.1)
RBC # BLD AUTO: 3.15 X10(6)/MCL (ref 4.7–6.1)
SODIUM SERPL-SCNC: 139 MMOL/L (ref 136–145)
WBC # BLD AUTO: 12.33 X10(3)/MCL (ref 4.5–11.5)

## 2024-10-27 PROCEDURE — 25000003 PHARM REV CODE 250: Performed by: INTERNAL MEDICINE

## 2024-10-27 PROCEDURE — 94760 N-INVAS EAR/PLS OXIMETRY 1: CPT

## 2024-10-27 PROCEDURE — 99900035 HC TECH TIME PER 15 MIN (STAT)

## 2024-10-27 PROCEDURE — 27000207 HC ISOLATION

## 2024-10-27 PROCEDURE — 27100171 HC OXYGEN HIGH FLOW UP TO 24 HOURS

## 2024-10-27 PROCEDURE — 85025 COMPLETE CBC W/AUTO DIFF WBC: CPT | Performed by: INTERNAL MEDICINE

## 2024-10-27 PROCEDURE — 80048 BASIC METABOLIC PNL TOTAL CA: CPT | Performed by: INTERNAL MEDICINE

## 2024-10-27 PROCEDURE — C1751 CATH, INF, PER/CENT/MIDLINE: HCPCS

## 2024-10-27 PROCEDURE — 25000003 PHARM REV CODE 250: Performed by: STUDENT IN AN ORGANIZED HEALTH CARE EDUCATION/TRAINING PROGRAM

## 2024-10-27 PROCEDURE — 76937 US GUIDE VASCULAR ACCESS: CPT

## 2024-10-27 PROCEDURE — 63600175 PHARM REV CODE 636 W HCPCS: Performed by: INTERNAL MEDICINE

## 2024-10-27 PROCEDURE — 21400001 HC TELEMETRY ROOM

## 2024-10-27 PROCEDURE — 25000003 PHARM REV CODE 250: Performed by: NURSE PRACTITIONER

## 2024-10-27 PROCEDURE — 36415 COLL VENOUS BLD VENIPUNCTURE: CPT | Performed by: INTERNAL MEDICINE

## 2024-10-27 PROCEDURE — 63600175 PHARM REV CODE 636 W HCPCS: Performed by: STUDENT IN AN ORGANIZED HEALTH CARE EDUCATION/TRAINING PROGRAM

## 2024-10-27 PROCEDURE — A4216 STERILE WATER/SALINE, 10 ML: HCPCS | Performed by: STUDENT IN AN ORGANIZED HEALTH CARE EDUCATION/TRAINING PROGRAM

## 2024-10-27 PROCEDURE — 94799 UNLISTED PULMONARY SVC/PX: CPT

## 2024-10-27 PROCEDURE — 36410 VNPNXR 3YR/> PHY/QHP DX/THER: CPT

## 2024-10-27 PROCEDURE — 94761 N-INVAS EAR/PLS OXIMETRY MLT: CPT

## 2024-10-27 RX ADMIN — SODIUM CHLORIDE, PRESERVATIVE FREE 10 ML: 5 INJECTION INTRAVENOUS at 12:10

## 2024-10-27 RX ADMIN — OXYCODONE AND ACETAMINOPHEN 1 TABLET: 10; 325 TABLET ORAL at 05:10

## 2024-10-27 RX ADMIN — SENNOSIDES AND DOCUSATE SODIUM 1 TABLET: 50; 8.6 TABLET ORAL at 10:10

## 2024-10-27 RX ADMIN — SODIUM CHLORIDE, PRESERVATIVE FREE 10 ML: 5 INJECTION INTRAVENOUS at 11:10

## 2024-10-27 RX ADMIN — OXYCODONE AND ACETAMINOPHEN 1 TABLET: 10; 325 TABLET ORAL at 01:10

## 2024-10-27 RX ADMIN — CIPROFLOXACIN 400 MG: 2 INJECTION, SOLUTION INTRAVENOUS at 12:10

## 2024-10-27 RX ADMIN — GABAPENTIN 400 MG: 300 CAPSULE ORAL at 01:10

## 2024-10-27 RX ADMIN — NIFEDIPINE 60 MG: 60 TABLET, FILM COATED, EXTENDED RELEASE ORAL at 09:10

## 2024-10-27 RX ADMIN — CIPROFLOXACIN 400 MG: 2 INJECTION, SOLUTION INTRAVENOUS at 11:10

## 2024-10-27 RX ADMIN — GABAPENTIN 400 MG: 300 CAPSULE ORAL at 10:10

## 2024-10-27 RX ADMIN — OXYBUTYNIN CHLORIDE 5 MG: 5 TABLET ORAL at 08:10

## 2024-10-27 RX ADMIN — DOXYCYCLINE HYCLATE 100 MG: 100 TABLET, COATED ORAL at 09:10

## 2024-10-27 RX ADMIN — OXYBUTYNIN CHLORIDE 5 MG: 5 TABLET ORAL at 04:10

## 2024-10-27 RX ADMIN — TRAZODONE HYDROCHLORIDE 200 MG: 100 TABLET ORAL at 10:10

## 2024-10-27 RX ADMIN — OXYCODONE AND ACETAMINOPHEN 1 TABLET: 10; 325 TABLET ORAL at 04:10

## 2024-10-27 RX ADMIN — SODIUM CHLORIDE, PRESERVATIVE FREE 10 ML: 5 INJECTION INTRAVENOUS at 05:10

## 2024-10-27 RX ADMIN — DOXYCYCLINE HYCLATE 100 MG: 100 TABLET, COATED ORAL at 10:10

## 2024-10-27 RX ADMIN — CARVEDILOL 25 MG: 12.5 TABLET, FILM COATED ORAL at 08:10

## 2024-10-27 RX ADMIN — SODIUM CHLORIDE, PRESERVATIVE FREE 10 ML: 5 INJECTION INTRAVENOUS at 06:10

## 2024-10-27 RX ADMIN — AMPICILLIN SODIUM AND SULBACTAM SODIUM 3 G: 2; 1 INJECTION, POWDER, FOR SOLUTION INTRAMUSCULAR; INTRAVENOUS at 10:10

## 2024-10-27 RX ADMIN — ATORVASTATIN CALCIUM 20 MG: 10 TABLET, FILM COATED ORAL at 10:10

## 2024-10-27 RX ADMIN — OXYBUTYNIN CHLORIDE 5 MG: 5 TABLET ORAL at 10:10

## 2024-10-27 RX ADMIN — OXYCODONE AND ACETAMINOPHEN 1 TABLET: 10; 325 TABLET ORAL at 10:10

## 2024-10-27 RX ADMIN — SENNOSIDES AND DOCUSATE SODIUM 1 TABLET: 50; 8.6 TABLET ORAL at 09:10

## 2024-10-27 RX ADMIN — FLUOXETINE HYDROCHLORIDE 20 MG: 20 CAPSULE ORAL at 08:10

## 2024-10-27 RX ADMIN — GABAPENTIN 400 MG: 300 CAPSULE ORAL at 04:10

## 2024-10-27 RX ADMIN — CARVEDILOL 25 MG: 12.5 TABLET, FILM COATED ORAL at 10:10

## 2024-10-27 RX ADMIN — FENOFIBRATE 48 MG: 48 TABLET, FILM COATED ORAL at 08:10

## 2024-10-27 RX ADMIN — AMPICILLIN SODIUM AND SULBACTAM SODIUM 3 G: 2; 1 INJECTION, POWDER, FOR SOLUTION INTRAMUSCULAR; INTRAVENOUS at 04:10

## 2024-10-27 RX ADMIN — PANTOPRAZOLE SODIUM 40 MG: 40 INJECTION, POWDER, LYOPHILIZED, FOR SOLUTION INTRAVENOUS at 08:10

## 2024-10-27 RX ADMIN — OXYCODONE AND ACETAMINOPHEN 1 TABLET: 10; 325 TABLET ORAL at 08:10
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
## 2024-10-28 LAB
ANION GAP SERPL CALC-SCNC: 6 MEQ/L
BASOPHILS # BLD AUTO: 0.02 X10(3)/MCL
BASOPHILS NFR BLD AUTO: 0.2 %
BUN SERPL-MCNC: 14.2 MG/DL (ref 8.4–25.7)
CALCIUM SERPL-MCNC: 8.2 MG/DL (ref 8.8–10)
CHLORIDE SERPL-SCNC: 103 MMOL/L (ref 98–107)
CO2 SERPL-SCNC: 27 MMOL/L (ref 23–31)
CREAT SERPL-MCNC: 1.11 MG/DL (ref 0.72–1.25)
CREAT/UREA NIT SERPL: 13
EOSINOPHIL # BLD AUTO: 0.06 X10(3)/MCL (ref 0–0.9)
EOSINOPHIL NFR BLD AUTO: 0.5 %
ERYTHROCYTE [DISTWIDTH] IN BLOOD BY AUTOMATED COUNT: 15.9 % (ref 11.5–17)
GFR SERPLBLD CREATININE-BSD FMLA CKD-EPI: >60 ML/MIN/1.73/M2
GLUCOSE SERPL-MCNC: 84 MG/DL (ref 82–115)
HCT VFR BLD AUTO: 26.4 % (ref 42–52)
HGB BLD-MCNC: 8.6 G/DL (ref 14–18)
IMM GRANULOCYTES # BLD AUTO: 0.14 X10(3)/MCL (ref 0–0.04)
IMM GRANULOCYTES NFR BLD AUTO: 1.2 %
LYMPHOCYTES # BLD AUTO: 1.39 X10(3)/MCL (ref 0.6–4.6)
LYMPHOCYTES NFR BLD AUTO: 11.5 %
MCH RBC QN AUTO: 28 PG (ref 27–31)
MCHC RBC AUTO-ENTMCNC: 32.6 G/DL (ref 33–36)
MCV RBC AUTO: 86 FL (ref 80–94)
MONOCYTES # BLD AUTO: 1.33 X10(3)/MCL (ref 0.1–1.3)
MONOCYTES NFR BLD AUTO: 11 %
NEUTROPHILS # BLD AUTO: 9.13 X10(3)/MCL (ref 2.1–9.2)
NEUTROPHILS NFR BLD AUTO: 75.6 %
NRBC BLD AUTO-RTO: 0.2 %
PLATELET # BLD AUTO: 478 X10(3)/MCL (ref 130–400)
PMV BLD AUTO: 9.2 FL (ref 7.4–10.4)
POCT GLUCOSE: 82 MG/DL (ref 70–110)
POCT GLUCOSE: 89 MG/DL (ref 70–110)
POCT GLUCOSE: 90 MG/DL (ref 70–110)
POCT GLUCOSE: 92 MG/DL (ref 70–110)
POTASSIUM SERPL-SCNC: 4.1 MMOL/L (ref 3.5–5.1)
RBC # BLD AUTO: 3.07 X10(6)/MCL (ref 4.7–6.1)
SODIUM SERPL-SCNC: 136 MMOL/L (ref 136–145)
TOBRAMYCIN TROUGH SERPL-MCNC: 6.5 UG/ML (ref 0.3–2)
WBC # BLD AUTO: 12.07 X10(3)/MCL (ref 4.5–11.5)

## 2024-10-28 PROCEDURE — 63600175 PHARM REV CODE 636 W HCPCS: Performed by: STUDENT IN AN ORGANIZED HEALTH CARE EDUCATION/TRAINING PROGRAM

## 2024-10-28 PROCEDURE — 80048 BASIC METABOLIC PNL TOTAL CA: CPT | Performed by: INTERNAL MEDICINE

## 2024-10-28 PROCEDURE — 25000003 PHARM REV CODE 250: Performed by: STUDENT IN AN ORGANIZED HEALTH CARE EDUCATION/TRAINING PROGRAM

## 2024-10-28 PROCEDURE — 27000207 HC ISOLATION

## 2024-10-28 PROCEDURE — 94760 N-INVAS EAR/PLS OXIMETRY 1: CPT

## 2024-10-28 PROCEDURE — 94799 UNLISTED PULMONARY SVC/PX: CPT

## 2024-10-28 PROCEDURE — 85025 COMPLETE CBC W/AUTO DIFF WBC: CPT | Performed by: INTERNAL MEDICINE

## 2024-10-28 PROCEDURE — A4216 STERILE WATER/SALINE, 10 ML: HCPCS | Performed by: STUDENT IN AN ORGANIZED HEALTH CARE EDUCATION/TRAINING PROGRAM

## 2024-10-28 PROCEDURE — 36415 COLL VENOUS BLD VENIPUNCTURE: CPT | Performed by: NURSE PRACTITIONER

## 2024-10-28 PROCEDURE — 25000003 PHARM REV CODE 250: Performed by: INTERNAL MEDICINE

## 2024-10-28 PROCEDURE — 63600175 PHARM REV CODE 636 W HCPCS: Performed by: INTERNAL MEDICINE

## 2024-10-28 PROCEDURE — 21400001 HC TELEMETRY ROOM

## 2024-10-28 PROCEDURE — 80200 ASSAY OF TOBRAMYCIN: CPT | Performed by: NURSE PRACTITIONER

## 2024-10-28 PROCEDURE — 99900035 HC TECH TIME PER 15 MIN (STAT)

## 2024-10-28 PROCEDURE — 36415 COLL VENOUS BLD VENIPUNCTURE: CPT | Performed by: INTERNAL MEDICINE

## 2024-10-28 PROCEDURE — 99900031 HC PATIENT EDUCATION (STAT)

## 2024-10-28 PROCEDURE — 27100171 HC OXYGEN HIGH FLOW UP TO 24 HOURS

## 2024-10-28 PROCEDURE — 25000003 PHARM REV CODE 250: Performed by: NURSE PRACTITIONER

## 2024-10-28 PROCEDURE — 25000242 PHARM REV CODE 250 ALT 637 W/ HCPCS: Performed by: INTERNAL MEDICINE

## 2024-10-28 PROCEDURE — 63600175 PHARM REV CODE 636 W HCPCS: Performed by: NURSE PRACTITIONER

## 2024-10-28 PROCEDURE — 94640 AIRWAY INHALATION TREATMENT: CPT

## 2024-10-28 RX ORDER — ENOXAPARIN SODIUM 100 MG/ML
1 INJECTION SUBCUTANEOUS EVERY 12 HOURS
Status: DISCONTINUED | OUTPATIENT
Start: 2024-10-28 | End: 2024-11-04

## 2024-10-28 RX ORDER — LACTULOSE 10 G/15ML
15 SOLUTION ORAL 3 TIMES DAILY
Status: DISCONTINUED | OUTPATIENT
Start: 2024-10-28 | End: 2024-11-06

## 2024-10-28 RX ORDER — FUROSEMIDE 10 MG/ML
20 INJECTION INTRAMUSCULAR; INTRAVENOUS ONCE
Status: COMPLETED | OUTPATIENT
Start: 2024-10-28 | End: 2024-10-28

## 2024-10-28 RX ADMIN — GABAPENTIN 400 MG: 300 CAPSULE ORAL at 01:10

## 2024-10-28 RX ADMIN — ISODIUM CHLORIDE 3 ML: 0.03 SOLUTION RESPIRATORY (INHALATION) at 09:10

## 2024-10-28 RX ADMIN — OXYBUTYNIN CHLORIDE 5 MG: 5 TABLET ORAL at 09:10

## 2024-10-28 RX ADMIN — OXYCODONE AND ACETAMINOPHEN 1 TABLET: 10; 325 TABLET ORAL at 05:10

## 2024-10-28 RX ADMIN — SODIUM CHLORIDE, PRESERVATIVE FREE 10 ML: 5 INJECTION INTRAVENOUS at 05:10

## 2024-10-28 RX ADMIN — DOXYCYCLINE HYCLATE 100 MG: 100 TABLET, COATED ORAL at 10:10

## 2024-10-28 RX ADMIN — TRAZODONE HYDROCHLORIDE 200 MG: 100 TABLET ORAL at 10:10

## 2024-10-28 RX ADMIN — OXYCODONE AND ACETAMINOPHEN 1 TABLET: 10; 325 TABLET ORAL at 01:10

## 2024-10-28 RX ADMIN — AMPICILLIN SODIUM AND SULBACTAM SODIUM 3 G: 2; 1 INJECTION, POWDER, FOR SOLUTION INTRAMUSCULAR; INTRAVENOUS at 11:10

## 2024-10-28 RX ADMIN — CARVEDILOL 25 MG: 12.5 TABLET, FILM COATED ORAL at 09:10

## 2024-10-28 RX ADMIN — FENOFIBRATE 48 MG: 48 TABLET, FILM COATED ORAL at 09:10

## 2024-10-28 RX ADMIN — CIPROFLOXACIN 400 MG: 2 INJECTION, SOLUTION INTRAVENOUS at 10:10

## 2024-10-28 RX ADMIN — ATORVASTATIN CALCIUM 20 MG: 10 TABLET, FILM COATED ORAL at 10:10

## 2024-10-28 RX ADMIN — SENNOSIDES AND DOCUSATE SODIUM 1 TABLET: 50; 8.6 TABLET ORAL at 10:10

## 2024-10-28 RX ADMIN — IPRATROPIUM BROMIDE AND ALBUTEROL SULFATE 3 ML: 2.5; .5 SOLUTION RESPIRATORY (INHALATION) at 08:10

## 2024-10-28 RX ADMIN — GABAPENTIN 400 MG: 300 CAPSULE ORAL at 05:10

## 2024-10-28 RX ADMIN — DOXYCYCLINE HYCLATE 100 MG: 100 TABLET, COATED ORAL at 09:10

## 2024-10-28 RX ADMIN — ENOXAPARIN SODIUM 80 MG: 80 INJECTION SUBCUTANEOUS at 11:10

## 2024-10-28 RX ADMIN — FLUOXETINE HYDROCHLORIDE 20 MG: 20 CAPSULE ORAL at 09:10

## 2024-10-28 RX ADMIN — OXYBUTYNIN CHLORIDE 5 MG: 5 TABLET ORAL at 05:10

## 2024-10-28 RX ADMIN — LACTULOSE 10 G: 10 SOLUTION ORAL at 10:10

## 2024-10-28 RX ADMIN — SENNOSIDES AND DOCUSATE SODIUM 1 TABLET: 50; 8.6 TABLET ORAL at 09:10

## 2024-10-28 RX ADMIN — TOBRAMYCIN SULFATE 510 MG: 40 INJECTION, SOLUTION INTRAMUSCULAR; INTRAVENOUS at 11:10

## 2024-10-28 RX ADMIN — CIPROFLOXACIN 400 MG: 2 INJECTION, SOLUTION INTRAVENOUS at 09:10

## 2024-10-28 RX ADMIN — SODIUM CHLORIDE, PRESERVATIVE FREE 10 ML: 5 INJECTION INTRAVENOUS at 11:10

## 2024-10-28 RX ADMIN — METHOCARBAMOL 750 MG: 750 TABLET ORAL at 01:10

## 2024-10-28 RX ADMIN — ISODIUM CHLORIDE 3 ML: 0.03 SOLUTION RESPIRATORY (INHALATION) at 08:10

## 2024-10-28 RX ADMIN — AMPICILLIN SODIUM AND SULBACTAM SODIUM 3 G: 2; 1 INJECTION, POWDER, FOR SOLUTION INTRAMUSCULAR; INTRAVENOUS at 12:10

## 2024-10-28 RX ADMIN — AMPICILLIN SODIUM AND SULBACTAM SODIUM 3 G: 2; 1 INJECTION, POWDER, FOR SOLUTION INTRAMUSCULAR; INTRAVENOUS at 05:10

## 2024-10-28 RX ADMIN — METHOCARBAMOL 750 MG: 750 TABLET ORAL at 10:10

## 2024-10-28 RX ADMIN — GABAPENTIN 400 MG: 300 CAPSULE ORAL at 10:10

## 2024-10-28 RX ADMIN — OXYCODONE AND ACETAMINOPHEN 1 TABLET: 10; 325 TABLET ORAL at 09:10

## 2024-10-28 RX ADMIN — OXYBUTYNIN CHLORIDE 5 MG: 5 TABLET ORAL at 10:10

## 2024-10-28 RX ADMIN — OXYCODONE AND ACETAMINOPHEN 1 TABLET: 10; 325 TABLET ORAL at 10:10

## 2024-10-28 RX ADMIN — LACTULOSE 15 G: 10 SOLUTION ORAL at 05:10

## 2024-10-28 RX ADMIN — NIFEDIPINE 60 MG: 60 TABLET, FILM COATED, EXTENDED RELEASE ORAL at 09:10

## 2024-10-28 RX ADMIN — FUROSEMIDE 20 MG: 10 INJECTION, SOLUTION INTRAMUSCULAR; INTRAVENOUS at 11:10

## 2024-10-28 RX ADMIN — PANTOPRAZOLE SODIUM 40 MG: 40 INJECTION, POWDER, LYOPHILIZED, FOR SOLUTION INTRAVENOUS at 09:10

## 2024-10-28 NOTE — PLAN OF CARE
LTAC patient choice list given and explained to patient. He does not wish for LTAC placement at any of the facilities presented to him. Only wishes for placement at TCU. CM did explain to patient he does not qualify for TCU due to vapotherm. Stated he will wait until he comes off of vapotherm for placement at TCU. Nurse at bedside to witness conversation.

## 2024-10-28 NOTE — PROGRESS NOTES
Pharmacist Renal Dose Adjustment Note    Bianca Khan is a 59 y.o. male being treated with the medication unasyn    Patient Data:    Vital Signs (Most Recent):  Temp: 98 °F (36.7 °C) (10/28/24 0729)  Pulse: 72 (10/28/24 0900)  Resp: 18 (10/28/24 0930)  BP: 138/86 (10/28/24 0729)  SpO2: 100 % (10/28/24 0900) Vital Signs (72h Range):  Temp:  [97.7 °F (36.5 °C)-99.2 °F (37.3 °C)]   Pulse:  [70-96]   Resp:  [16-20]   BP: (105-154)/(56-92)   SpO2:  [90 %-100 %]      Recent Labs   Lab 10/26/24  0445 10/27/24  0509 10/28/24  0520   CREATININE 1.33* 1.23 1.11     Serum creatinine: 1.11 mg/dL 10/28/24 0520  Estimated creatinine clearance: 73.1 mL/min    Medication:unasyn dose: 3g frequency q8h will be changed to medication:unasyn dose:3g frequency:q6h    Pharmacist's Name: Ravindra Townsend  Pharmacist's Extension: [phone]

## 2024-10-28 NOTE — PROCEDURES
"Bianca Khan is a 59 y.o. male patient.    Temp: 97.7 °F (36.5 °C) (10/27/24 1612)  Pulse: 91 (10/27/24 1612)  Resp: 16 (10/27/24 1717)  BP: 132/84 (10/27/24 1612)  SpO2: (!) 93 % (10/27/24 1612)  Weight: 79.3 kg (174 lb 13.2 oz) (10/17/24 1212)  Height: 5' 10" (177.8 cm) (10/09/24 0430)    PICC  Date/Time: 10/27/2024 7:22 PM  Performed by: Baltazar Verduzco RN  Consent Done: Yes  Time out: Immediately prior to procedure a time out was called to verify the correct patient, procedure, equipment, support staff and site/side marked as required  Indications: med administration and vascular access  Anesthesia: local infiltration  Local anesthetic: lidocaine 1% without epinephrine  Anesthetic Total (mL): 5  Preparation: skin prepped with ChloraPrep  Skin prep agent dried: skin prep agent completely dried prior to procedure  Sterile barriers: all five maximum sterile barriers used - cap, mask, sterile gown, sterile gloves, and large sterile sheet  Hand hygiene: hand hygiene performed prior to central venous catheter insertion  Location details: left basilic  Catheter type: single lumen  Catheter size: 4 Fr  Catheter Length: 11cm    Number of attempts: 1  Post-procedure: blood return through all ports, chlorhexidine patch and sterile dressing applied            Name Baltazar Verduzco RN  10/27/2024    "
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
## 2024-10-28 NOTE — PROGRESS NOTES
Pharmacokinetic Initial Assessment: Tobramycin    Assessment:  Weight utilized for dose calculation: Ideal Body Weight  Dosing method utilized: extended interval dosing    Plan: Extended interval dosing regimen: Tobramycin (7mg/kg) 510 mg IV once, followed by a random level to be drawn on 10/28 at 2000, 8-12 hours after the first dose. Goal Level < 1 mg/L    Pharmacy will continue to monitor.    Please contact pharmacy at extension [phone] with any questions regarding this assessment.    Thank you for the consult,    Ravindra Townsend       Patient brief summary:  Bianca Khan is a 59 y.o. male initiated on aminoglycoside therapy for treatment of suspected lower respiratory infection    Drug Allergies:   Review of patient's allergies indicates:   Allergen Reactions    Baclofen Itching and Anxiety       Actual Body Weight:   79.3 kg    Adjust Body Weight:   75.5 kg    Ideal Body Weight:  73 kg    Renal Function:   Estimated Creatinine Clearance: 73.1 mL/min (based on SCr of 1.11 mg/dL).,     Dialysis Method (if applicable):  N/A    CBC (last 72 hours):  Recent Labs   Lab Result Units 10/25/24  2036 10/26/24  0445 10/27/24  0509 10/28/24  0520   WBC x10(3)/mcL 11.36 11.11 12.33* 12.07*   Hgb g/dL 7.1* 7.0* 8.8* 8.6*   Hct % 21.9* 21.5* 27.7* 26.4*   Platelet x10(3)/mcL 403* 411* 486* 478*   Mono % % 11.1 12.0 11.2 11.0   Eos % % 0.5 0.6 0.6 0.5   Basophil % % 0.3 0.4 0.3 0.2       Metabolic Panel (last 72 hours):  Recent Labs   Lab Result Units 10/26/24  0445 10/27/24  0509 10/28/24  0520   Sodium mmol/L 138 139 136   Potassium mmol/L 3.4* 4.1 4.1   Chloride mmol/L 102 104 103   CO2 mmol/L 29 26 27   Glucose mg/dL 127* 84 84   Blood Urea Nitrogen mg/dL 18.2 14.7 14.2   Creatinine mg/dL 1.33* 1.23 1.11       Microbiologic Results:  Microbiology Results (last 7 days)       Procedure Component Value Units Date/Time    Respiratory Culture [[id number]]  (Abnormal)  (Susceptibility) Collected: 10/23/24 1926    Order Status:  Completed Specimen: Sputum, Expectorated Updated: 10/26/24 1141     Respiratory Culture Moderate Pseudomonas aeruginosa     Comment: with normal respiratory kasia  CRPA (Carbapenem-resistant Pseudomonas areuginosa)        GRAM STAIN Quality 1+      Many Yeast      Moderate Gram Negative Rods      Moderate Gram positive cocci      Few Gram Positive Rods    Blood Culture [[id number]]  (Normal) Collected: 10/17/24 0942    Order Status: Completed Specimen: Blood Updated: 10/22/24 1100     Blood Culture No Growth at 5 days    Blood Culture [[id number]]  (Normal) Collected: 10/17/24 0942    Order Status: Completed Specimen: Blood Updated: 10/22/24 1100     Blood Culture No Growth at 5 days

## 2024-10-29 LAB
ALBUMIN SERPL-MCNC: 1.9 G/DL (ref 3.4–4.8)
ALBUMIN/GLOB SERPL: 0.4 RATIO (ref 1.1–2)
ALP SERPL-CCNC: 87 UNIT/L (ref 40–150)
ALT SERPL-CCNC: 18 UNIT/L (ref 0–55)
ANION GAP SERPL CALC-SCNC: 9 MEQ/L
AST SERPL-CCNC: 26 UNIT/L (ref 5–34)
BASOPHILS # BLD AUTO: 0.03 X10(3)/MCL
BASOPHILS NFR BLD AUTO: 0.2 %
BILIRUB SERPL-MCNC: 1 MG/DL
BUN SERPL-MCNC: 13.7 MG/DL (ref 8.4–25.7)
CALCIUM SERPL-MCNC: 8 MG/DL (ref 8.8–10)
CHLORIDE SERPL-SCNC: 103 MMOL/L (ref 98–107)
CO2 SERPL-SCNC: 22 MMOL/L (ref 23–31)
CREAT SERPL-MCNC: 1.21 MG/DL (ref 0.72–1.25)
CREAT/UREA NIT SERPL: 11
EOSINOPHIL # BLD AUTO: 0.01 X10(3)/MCL (ref 0–0.9)
EOSINOPHIL NFR BLD AUTO: 0.1 %
ERYTHROCYTE [DISTWIDTH] IN BLOOD BY AUTOMATED COUNT: 15.9 % (ref 11.5–17)
GFR SERPLBLD CREATININE-BSD FMLA CKD-EPI: >60 ML/MIN/1.73/M2
GLOBULIN SER-MCNC: 4.6 GM/DL (ref 2.4–3.5)
GLUCOSE SERPL-MCNC: 109 MG/DL (ref 82–115)
HCT VFR BLD AUTO: 31.1 % (ref 42–52)
HGB BLD-MCNC: 9.8 G/DL (ref 14–18)
IMM GRANULOCYTES # BLD AUTO: 0.12 X10(3)/MCL (ref 0–0.04)
IMM GRANULOCYTES NFR BLD AUTO: 0.9 %
LYMPHOCYTES # BLD AUTO: 1.21 X10(3)/MCL (ref 0.6–4.6)
LYMPHOCYTES NFR BLD AUTO: 9 %
MCH RBC QN AUTO: 28.6 PG (ref 27–31)
MCHC RBC AUTO-ENTMCNC: 31.5 G/DL (ref 33–36)
MCV RBC AUTO: 90.7 FL (ref 80–94)
MONOCYTES # BLD AUTO: 1.07 X10(3)/MCL (ref 0.1–1.3)
MONOCYTES NFR BLD AUTO: 7.9 %
NEUTROPHILS # BLD AUTO: 11.05 X10(3)/MCL (ref 2.1–9.2)
NEUTROPHILS NFR BLD AUTO: 81.9 %
NRBC BLD AUTO-RTO: 0.1 %
PLATELET # BLD AUTO: 486 X10(3)/MCL (ref 130–400)
PMV BLD AUTO: 9 FL (ref 7.4–10.4)
POCT GLUCOSE: 82 MG/DL (ref 70–110)
POTASSIUM SERPL-SCNC: 4.4 MMOL/L (ref 3.5–5.1)
PROT SERPL-MCNC: 6.5 GM/DL (ref 5.8–7.6)
RBC # BLD AUTO: 3.43 X10(6)/MCL (ref 4.7–6.1)
SODIUM SERPL-SCNC: 134 MMOL/L (ref 136–145)
UPPER ARTERIAL LEFT ARM AXILLARY SYS MAX: 61 CM/S
UPPER ARTERIAL LEFT ARM BRACHIAL SYS MAX: 89 CM/S
UPPER ARTERIAL LEFT ARM RADIAL SYS MAX: 97 CM/S
UPPER ARTERIAL LEFT ARM SUBCLAVIAN SYS MAX: 73 CM/S
UPPER ARTERIAL LEFT ARM ULNAR SYS MAX: 72 CM/S
WBC # BLD AUTO: 13.49 X10(3)/MCL (ref 4.5–11.5)

## 2024-10-29 PROCEDURE — A4216 STERILE WATER/SALINE, 10 ML: HCPCS | Performed by: STUDENT IN AN ORGANIZED HEALTH CARE EDUCATION/TRAINING PROGRAM

## 2024-10-29 PROCEDURE — 25000003 PHARM REV CODE 250: Performed by: INTERNAL MEDICINE

## 2024-10-29 PROCEDURE — 21400001 HC TELEMETRY ROOM

## 2024-10-29 PROCEDURE — 25000003 PHARM REV CODE 250: Performed by: NURSE PRACTITIONER

## 2024-10-29 PROCEDURE — 05HY33Z INSERTION OF INFUSION DEVICE INTO UPPER VEIN, PERCUTANEOUS APPROACH: ICD-10-PCS | Performed by: SURGERY

## 2024-10-29 PROCEDURE — 94760 N-INVAS EAR/PLS OXIMETRY 1: CPT

## 2024-10-29 PROCEDURE — 99900031 HC PATIENT EDUCATION (STAT)

## 2024-10-29 PROCEDURE — 36415 COLL VENOUS BLD VENIPUNCTURE: CPT | Performed by: STUDENT IN AN ORGANIZED HEALTH CARE EDUCATION/TRAINING PROGRAM

## 2024-10-29 PROCEDURE — 80200 ASSAY OF TOBRAMYCIN: CPT | Performed by: INTERNAL MEDICINE

## 2024-10-29 PROCEDURE — 27100171 HC OXYGEN HIGH FLOW UP TO 24 HOURS

## 2024-10-29 PROCEDURE — 63600175 PHARM REV CODE 636 W HCPCS: Performed by: STUDENT IN AN ORGANIZED HEALTH CARE EDUCATION/TRAINING PROGRAM

## 2024-10-29 PROCEDURE — 94799 UNLISTED PULMONARY SVC/PX: CPT

## 2024-10-29 PROCEDURE — 99900035 HC TECH TIME PER 15 MIN (STAT)

## 2024-10-29 PROCEDURE — 27000207 HC ISOLATION

## 2024-10-29 PROCEDURE — 85025 COMPLETE CBC W/AUTO DIFF WBC: CPT | Performed by: STUDENT IN AN ORGANIZED HEALTH CARE EDUCATION/TRAINING PROGRAM

## 2024-10-29 PROCEDURE — 80053 COMPREHEN METABOLIC PANEL: CPT | Performed by: STUDENT IN AN ORGANIZED HEALTH CARE EDUCATION/TRAINING PROGRAM

## 2024-10-29 PROCEDURE — 25000003 PHARM REV CODE 250: Performed by: STUDENT IN AN ORGANIZED HEALTH CARE EDUCATION/TRAINING PROGRAM

## 2024-10-29 PROCEDURE — 36415 COLL VENOUS BLD VENIPUNCTURE: CPT | Performed by: INTERNAL MEDICINE

## 2024-10-29 PROCEDURE — 63600175 PHARM REV CODE 636 W HCPCS: Performed by: INTERNAL MEDICINE

## 2024-10-29 RX ORDER — FUROSEMIDE 10 MG/ML
20 INJECTION INTRAMUSCULAR; INTRAVENOUS ONCE
Status: COMPLETED | OUTPATIENT
Start: 2024-10-30 | End: 2024-10-29

## 2024-10-29 RX ORDER — FUROSEMIDE 10 MG/ML
20 INJECTION INTRAMUSCULAR; INTRAVENOUS ONCE
Status: COMPLETED | OUTPATIENT
Start: 2024-10-29 | End: 2024-10-29

## 2024-10-29 RX ADMIN — GABAPENTIN 400 MG: 300 CAPSULE ORAL at 09:10

## 2024-10-29 RX ADMIN — FUROSEMIDE 20 MG: 10 INJECTION, SOLUTION INTRAMUSCULAR; INTRAVENOUS at 11:10

## 2024-10-29 RX ADMIN — OXYCODONE AND ACETAMINOPHEN 1 TABLET: 10; 325 TABLET ORAL at 09:10

## 2024-10-29 RX ADMIN — SODIUM CHLORIDE, PRESERVATIVE FREE 10 ML: 5 INJECTION INTRAVENOUS at 05:10

## 2024-10-29 RX ADMIN — NIFEDIPINE 60 MG: 60 TABLET, FILM COATED, EXTENDED RELEASE ORAL at 09:10

## 2024-10-29 RX ADMIN — OXYCODONE AND ACETAMINOPHEN 1 TABLET: 10; 325 TABLET ORAL at 03:10

## 2024-10-29 RX ADMIN — SODIUM CHLORIDE, PRESERVATIVE FREE 10 ML: 5 INJECTION INTRAVENOUS at 11:10

## 2024-10-29 RX ADMIN — CARVEDILOL 25 MG: 12.5 TABLET, FILM COATED ORAL at 09:10

## 2024-10-29 RX ADMIN — SENNOSIDES AND DOCUSATE SODIUM 1 TABLET: 50; 8.6 TABLET ORAL at 09:10

## 2024-10-29 RX ADMIN — GABAPENTIN 400 MG: 300 CAPSULE ORAL at 05:10

## 2024-10-29 RX ADMIN — FENOFIBRATE 48 MG: 48 TABLET, FILM COATED ORAL at 09:10

## 2024-10-29 RX ADMIN — DOXYCYCLINE HYCLATE 100 MG: 100 TABLET, COATED ORAL at 09:10

## 2024-10-29 RX ADMIN — AMPICILLIN SODIUM AND SULBACTAM SODIUM 3 G: 2; 1 INJECTION, POWDER, FOR SOLUTION INTRAMUSCULAR; INTRAVENOUS at 05:10

## 2024-10-29 RX ADMIN — LACTULOSE 15 G: 10 SOLUTION ORAL at 09:10

## 2024-10-29 RX ADMIN — TOBRAMYCIN SULFATE 510 MG: 40 INJECTION, SOLUTION INTRAMUSCULAR; INTRAVENOUS at 11:10

## 2024-10-29 RX ADMIN — CIPROFLOXACIN 400 MG: 2 INJECTION, SOLUTION INTRAVENOUS at 10:10

## 2024-10-29 RX ADMIN — METHOCARBAMOL 750 MG: 750 TABLET ORAL at 06:10

## 2024-10-29 RX ADMIN — MORPHINE SULFATE 2 MG: 4 INJECTION, SOLUTION INTRAMUSCULAR; INTRAVENOUS at 05:10

## 2024-10-29 RX ADMIN — GABAPENTIN 400 MG: 300 CAPSULE ORAL at 02:10

## 2024-10-29 RX ADMIN — OXYCODONE AND ACETAMINOPHEN 1 TABLET: 10; 325 TABLET ORAL at 02:10

## 2024-10-29 RX ADMIN — METHOCARBAMOL 750 MG: 750 TABLET ORAL at 05:10

## 2024-10-29 RX ADMIN — ATORVASTATIN CALCIUM 20 MG: 10 TABLET, FILM COATED ORAL at 09:10

## 2024-10-29 RX ADMIN — ENOXAPARIN SODIUM 80 MG: 80 INJECTION SUBCUTANEOUS at 11:10

## 2024-10-29 RX ADMIN — OXYBUTYNIN CHLORIDE 5 MG: 5 TABLET ORAL at 02:10

## 2024-10-29 RX ADMIN — OXYBUTYNIN CHLORIDE 5 MG: 5 TABLET ORAL at 09:10

## 2024-10-29 RX ADMIN — FLUOXETINE HYDROCHLORIDE 20 MG: 20 CAPSULE ORAL at 09:10

## 2024-10-29 RX ADMIN — TRAZODONE HYDROCHLORIDE 200 MG: 100 TABLET ORAL at 09:10
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
## 2024-10-29 NOTE — CONSULTS
Inpatient Nutrition Assessment    Admit Date: 10/8/2024   Total duration of encounter: 21 days   Patient Age: 59 y.o.    Nutrition Recommendation/Prescription     -Continue Heart Healthy, Diabetic Diet as tolerated. Assist with meals and encourage intake.  -Continue Boost VHC (pt requests strawberry flavor only) BID to provide 530 kcal and 22 gm protein per container.   -Continue Malachi BID for wound healing.  -Consider an appetite stimulant as medically feasible.   -Monitor wt, labs, and intake.     Communication of Recommendations: reviewed with patient    Nutrition Assessment     Malnutrition Assessment/Nutrition-Focused Physical Exam    Malnutrition Context: acute illness or injury (10/17/24 1218)  Malnutrition Level: moderate (10/17/24 1218)  Energy Intake (Malnutrition):  (family denies) (10/17/24 1218)  Weight Loss (Malnutrition):  (does not meet criteria) (10/17/24 1218)  Subcutaneous Fat (Malnutrition): mild depletion (10/17/24 1218)     Upper Arm Region (Subcutaneous Fat Loss): mild depletion     Muscle Mass (Malnutrition): mild depletion (10/17/24 1218)     Clavicle Bone Region (Muscle Loss): mild depletion                    Fluid Accumulation (Malnutrition): mild (10/17/24 1218)        A minimum of two characteristics is recommended for diagnosis of either severe or non-severe malnutrition.    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 including ESBL E coli, Enterobacter, carbapenem resistant Pseudomonas, Enterococcus faecalis     Scheduled Medications:  ampicillin-sulbactam, 3 g, Q6H  atorvastatin, 20 mg, Nightly  calcium gluconate 1 g, 1 g, Once  carvediloL, 25 mg, BID  ciprofloxacin, 400 mg, Q12H  doxycycline, 100 mg, Q12H  enoxparin, 1 mg/kg, Q12H (treatment, non-standard time)  fenofibrate, 48 mg, Daily  FLUoxetine, 20 mg, Daily  furosemide (LASIX) injection, 20 mg, Once  gabapentin, 400 mg, Q8H  lactulose 10 gram/15 ml, 15 g, TID  NIFEdipine, 60 mg, Daily  oxybutynin, 5 mg, TID  pantoprazole, 40 mg, Daily  senna-docusate 8.6-50 mg, 1 tablet, BID  sodium chloride 0.9%, 10 mL, Q6H  tobramycin IV (PEDS and ADULTS), 7 mg/kg (Ideal), Q36H    Continuous Infusions:     PRN Medications:   0.9%  NaCl infusion (for blood administration), , Q24H PRN  acetaminophen, 650 mg, Q4H PRN  albuterol-ipratropium, 3 mL, Q4H PRN  aluminum-magnesium hydroxide-simethicone, 30 mL, QID PRN  dextrose 10%, 12.5 g, PRN  dextrose 10%, 25 g, PRN  diphenhydrAMINE, 50 mg, Q6H PRN  etomidate, , Code/trauma/sedation Med  glucagon (human recombinant), 1 mg, PRN  glucose, 16 g, PRN  glucose, 24 g, PRN  guaiFENesin 100 mg/5 ml, 200 mg, Q4H PRN  hydrALAZINE, 20 mg, Q4H PRN  hyoscyamine, 0.125 mg, Q4H PRN  insulin aspart U-100, 1-10 Units, QID (AC + HS) PRN  methocarbamoL, 750 mg, TID PRN  morphine, 2 mg, BID PRN  ondansetron, 4 mg, Q4H PRN  oxyCODONE-acetaminophen, 1 tablet, Q4H PRN  polyethylene glycol, 17 g, BID PRN  prochlorperazine, 5 mg, Q6H PRN  rocuronium, , Code/trauma/sedation Med  sodium chloride 0.9%, 10 mL, PRN  sodium chloride 0.9%, 10 mL, PRN  tobramycin - pharmacy to dose, , pharmacy to manage frequency  traZODone, 200 mg, Nightly PRN    Calorie Containing IV Medications: no significant kcals from medications at this time    Recent Labs   Lab 10/23/24  0430 10/24/24  0534 10/25/24  2036 10/26/24  0445 10/27/24  0509 10/28/24  0520    134*  --  138  139 136   K 3.8 3.8  --  3.4* 4.1 4.1   CALCIUM 7.8* 7.7*  --  8.1* 8.1* 8.2*   PHOS 2.4 2.5  --   --   --   --    MG 1.90 1.60  --   --   --   --     101  --  102 104 103   CO2 30 25  --  29 26 27   BUN 22.6 16.3  --  18.2 14.7 14.2   CREATININE 1.41* 1.37*  --  1.33* 1.23 1.11   EGFRNORACEVR 57 59  --  >60 >60 >60   GLUCOSE 96 93  --  127* 84 84   BILITOT 1.0 1.2  --   --   --   --    ALKPHOS 80 82  --   --   --   --    ALT 37 27  --   --   --   --    AST 26 22  --   --   --   --    ALBUMIN 1.9* 1.9*  --   --   --   --    WBC 10.75 11.05 11.36 11.11 12.33* 12.07*   HGB 6.9* 7.4* 7.1* 7.0* 8.8* 8.6*   HCT 21.3* 23.1* 21.9* 21.5* 27.7* 26.4*     Nutrition Orders:  Diet Adult Regular Cardiac (Low Na/Chol), Diabetic; 2000 Calories (up to 75 gm per meal)  Dietary nutrition supplements TID; Boost Very High Calorie Nutritional Drink - Strawberry,Dietary nutrition supplements BID; Malachi - Fruit Punch    Appetite/Oral Intake: poor/25-50% of meals  Factors Affecting Nutritional Intake: decreased appetite  Social Needs Impacting Access to Food: none identified  Food/Christianity/Cultural Preferences: none reported  Food Allergies: none reported  Last Bowel Movement: 10/18/24  Wound(s):     Altered Skin Integrity 06/28/23 2230 Right lateral Ankle #6-Tissue loss description: Full thickness       Wound 05/30/24 0000 Pressure Injury Right Buttocks-Tissue loss description: Full thickness       Altered Skin Integrity 01/09/24 0848 upper Sacral spine-Tissue loss description: Full thickness       Wound 08/22/24 0800 Other (comment) Left Heel-Tissue loss description: Full thickness    Comments    10/10: Pt was in surgery at time of visit. Having wound debridement with wound vac placement on Stage 4 wound of rt gluteus. Will add Malachi to assist with wound healing. Recommend vitamin regimen for wounds.     10/17/24 Patient transferred to ICU, on ventilator currently, no propofol. Spoke with patient's wife at bedside who reports  "patient was eating well with a good appetite prior to ICU transfer, she reports bringing him food from home, good intake of Malachi. Tube feeding recommendation provided.    10/18/24 Consult received for tube feeding, will order.    10/22/24: Per RN, pt did not want to really eat earlier; family present in the room and encouraging intake.     10/24/24: Per pt's family member, pt ate ~ half of breakfast. Pt denies n/v; would like a strawberry flavored oral supplement.     10/29/24: Pt still with decreased appetite; denies n/v; taking his oral supplements.     Anthropometrics    Height: 5' 10" (177.8 cm), Height Method: Stated  Last Weight: 79.3 kg (174 lb 13.2 oz) (10/17/24 1212), Weight Method: Bed Scale  BMI (Calculated): 25.1  BMI Classification: normal (BMI 18.5-24.9)        Ideal Body Weight (IBW), Male: 166 lb     % Ideal Body Weight, Male (lb): 96.95 %                 Usual Body Weight (UBW), k.57 kg-77.11 kg  % Usual Body Weight: 109.5  % Weight Change From Usual Weight: 9.27 %  Usual Weight Provided By: family/caregiver    Wt Readings from Last 5 Encounters:   10/17/24 79.3 kg (174 lb 13.2 oz)   24 63.5 kg (139 lb 15.9 oz)   24 71.2 kg (156 lb 15.5 oz)   24 78.5 kg (173 lb 1 oz)   01/10/24 78.5 kg (173 lb 1 oz)     Weight Change(s) Since Admission:   (10/17) took bed weight 79.3 kg during rounds (estimated 4 kg subtracted for equipment), increase noted  Wt Readings from Last 1 Encounters:   10/17/24 1212 79.3 kg (174 lb 13.2 oz)   10/09/24 0430 73 kg (160 lb 15 oz)   10/08/24 1719 63.5 kg (140 lb)   Admit Weight: 63.5 kg (140 lb) (10/08/24 1719), Weight Method: Stated    Estimated Needs    Weight Used For Calorie Calculations: 79.3 kg (174 lb 13.2 oz)  Energy Calorie Requirements (kcal):  kcal (25-30 kcal/kg)  Energy Need Method: Kcal/kg  Weight Used For Protein Calculations: 79.3 kg (174 lb 13.2 oz)  Protein Requirements: 95 gm (1.2g/kg)  Fluid Requirements (mL):  mL  CHO " Requirement: 262-315 gm (45% EEN)  Last Updated: 10/22    Enteral Nutrition Patient not receiving enteral nutrition at this time.    Parenteral Nutrition Patient not receiving parenteral nutrition support at this time.    Evaluation of Received Nutrient Intake    Calories: not meeting estimated needs  Protein: not meeting estimated needs    Patient Education Not applicable.    Nutrition Diagnosis     PES: Inadequate energy intake related to inability to consume sufficient nutrients as evidenced by less than 80% needs met. (active)  PES: Moderate acute disease or injury related malnutrition related to acute illness as evidenced by mild fat depletion, mild muscle depletion, and edema. (active)    Nutrition Interventions     Intervention(s): modified composition of enteral nutrition, modified rate of enteral nutrition, and collaboration with other providers  Goal: Meet greater than 80% of nutritional needs by follow-up. (goal not met)  Goal: Tolerate enteral feeding at goal rate by follow-up. (goal discontinued)    Nutrition Goals & Monitoring     Dietitian will monitor: food and beverage intake, energy intake, weight, and electrolyte/renal panel  Discharge planning: continue Heart Healthy, Diabetic diet with Boost VHC/Malachi oral supplements  Nutrition Risk/Follow-Up: high (follow-up in 1-4 days)   Please consult if re-assessment needed sooner.

## 2024-10-29 NOTE — PROGRESS NOTES
[facility] - 8th Floor Med Surg  Wound Care    Patient Name:  Bianca Khan   MRN:  [id number]  Date: 10/29/2024  Diagnosis: Sacral wound    History:     Past Medical History:   Diagnosis Date    Arthritis     Chronic ulcer of ankle 2022    ESBL (extended spectrum beta-lactamase) producing bacteria infection 2024    Frequent UTI 2019    Generalized anxiety disorder 2022    Neurogenic bladder 2022    Osteomyelitis 2022    Paraplegia     Presence of suprapubic catheter 2022    Pure hypercholesterolemia 2022    Retention of urine, unspecified 2019    Spinal cord injury at T1-T6 level 2018       Social History     Socioeconomic History    Marital status:    Tobacco Use    Smoking status: Some Days     Current packs/day: 0.00     Average packs/day: 0.2 packs/day for 41.5 years (10.4 ttl pk-yrs)     Types: Cigars, Cigarettes     Start date: 1982     Last attempt to quit: 2023     Years since quittin.2    Smokeless tobacco: Never   Substance and Sexual Activity    Alcohol use: Not Currently     Alcohol/week: 2.0 standard drinks of alcohol     Types: 2 Cans of beer per week    Drug use: Not Currently     Types: Oxycodone    Sexual activity: Not Currently     Partners: Female     Birth control/protection: None     Social Drivers of Health     Financial Resource Strain: Low Risk  (10/10/2024)    Overall Financial Resource Strain (CARDIA)     Difficulty of Paying Living Expenses: Not hard at all   Food Insecurity: No Food Insecurity (10/10/2024)    Hunger Vital Sign     Worried About Running Out of Food in the Last Year: Never true     Ran Out of Food in the Last Year: Never true   Transportation Needs: No Transportation Needs (10/10/2024)    TRANSPORTATION NEEDS     Transportation : No   Physical Activity: Inactive (2023)    Exercise Vital Sign     Days of Exercise per Week: 0 days     Minutes of Exercise per Session: 0 min    Stress: No Stress Concern Present (10/10/2024)    [employer] - Occupational Stress Questionnaire     Feeling of Stress : Only a little   Housing Stability: Low Risk  (10/10/2024)    Housing Stability Vital Sign     Unable to Pay for Housing in the Last Year: No     Homeless in the Last Year: No       Precautions:     Allergies as of 10/08/2024 - Reviewed 10/08/2024   Allergen Reaction Noted    Baclofen Itching and Anxiety 06/17/2019       WOC Assessment Details/Treatment        10/29/24 1200   WOCN Assessment   Visit Date 10/29/24   Visit Time 1200   Consult Type Follow Up   WOCN Speciality Wound   WOCN List wound vac   Wound pressure;surgical   Procedure wound vac   Intervention chart review;assessed;changed   Teaching on-going        Altered Skin Integrity 06/28/23 2230 Right lateral Ankle #6   Date First Assessed/Time First Assessed: 06/28/23 2230   Altered Skin Integrity Present on Admission - Did Patient arrive to the hospital with altered skin?: yes  Side: Right  Orientation: lateral  Location: Ankle  Wound Number: #6  Is this injury dev...   Wound Image    Description of Altered Skin Integrity Full thickness tissue loss. Subcutaneous fat may be visible but bone, tendon or muscle are not exposed   Dressing Appearance Intact;Dried drainage   Drainage Amount Scant   Drainage Characteristics/Odor Serous   Appearance Pink;Red;Yellow   Tissue loss description Full thickness   Black (%), Wound Tissue Color 0 %   Red (%), Wound Tissue Color 60 %   Yellow (%), Wound Tissue Color 40 %   Periwound Area Intact;Moist   Wound Edges Defined   Wound Length (cm) 2 cm   Wound Width (cm) 1.3 cm   Wound Depth (cm) 0.1 cm   Wound Volume (cm^3) 0.26 cm^3   Wound Surface Area (cm^2) 2.6 cm^2   Care Cleansed with:;Antimicrobial agent  (vashe)   Dressing Changed;Calcium alginate;Silver;Gauze   Off Loading Off loading shoe        Altered Skin Integrity 01/09/24 0848 upper Sacral spine   Date First  Assessed/Time First Assessed: 01/09/24 0848   Altered Skin Integrity Present on Admission - Did Patient arrive to the hospital with altered skin?: suspected hospital acquired  Orientation: upper  Location: Sacral spine   Wound Image    Description of Altered Skin Integrity Full thickness tissue loss. Subcutaneous fat may be visible but bone, tendon or muscle are not exposed   Dressing Appearance Intact;Moist drainage   Drainage Amount Small   Drainage Characteristics/Odor Serosanguineous   Appearance Pink;Red   Tissue loss description Full thickness   Black (%), Wound Tissue Color 0 %   Red (%), Wound Tissue Color 80 %   Yellow (%), Wound Tissue Color 20 %   Periwound Area Intact;Dry;Pale white   Wound Edges Defined   Wound Length (cm) 5.5 cm   Wound Width (cm) 6 cm   Wound Depth (cm) 1 cm   Wound Volume (cm^3) 33 cm^3   Wound Surface Area (cm^2) 33 cm^2   Care Cleansed with:;Soap and water   Dressing Changed;Gauze, wet to dry;Absorptive Pad        Wound 05/30/24 0000 Pressure Injury Right Buttocks   Date First Assessed/Time First Assessed: 05/30/24 0000   Primary Wound Type: Pressure Injury  Side: Right  Location: Buttocks  Is this injury device related?: No   Wound Image    Pressure Injury Stage U   Dressing Appearance Intact   Drainage Amount Small   Drainage Characteristics/Odor Serosanguineous   Appearance Pink;Red;Yellow   Tissue loss description Full thickness   Black (%), Wound Tissue Color 0 %   Red (%), Wound Tissue Color 70 %   Yellow (%), Wound Tissue Color 30 %   Periwound Area Intact;Dry   Wound Edges Defined   Wound Length (cm) 4 cm   Wound Width (cm) 5 cm   Wound Depth (cm) 2 cm   Wound Volume (cm^3) 40 cm^3   Wound Surface Area (cm^2) 20 cm^2   Care Cleansed with:;Antimicrobial agent  (vashe)   Dressing Changed;Other (comment)  (NPWT set @125, seal intact; 1 white foam, 1 black foam)        Wound 08/22/24 0800 Other (comment) Left Heel   Date First Assessed/Time First Assessed: 08/22/24 0800    Primary Wound Type: Other (comment)  Side: Left  Location: Heel   Wound Image    Dressing Appearance Dry;Intact;Clean   Drainage Amount None   Drainage Characteristics/Odor No odor   Appearance Pink;Red;Yellow   Tissue loss description Full thickness   Black (%), Wound Tissue Color 0 %   Red (%), Wound Tissue Color 90 %   Yellow (%), Wound Tissue Color 10 %   Periwound Area Intact;Dry   Wound Edges Defined   Wound Length (cm) 2 cm   Wound Width (cm) 3 cm   Wound Depth (cm) 0.2 cm   Wound Volume (cm^3) 1.2 cm^3   Wound Surface Area (cm^2) 6 cm^2   Care Cleansed with:;Antimicrobial agent  (vashe)   Dressing Changed;Calcium alginate;Silver;Absorptive Pad;Rolled gauze   Off Loading Off loading shoe        Negative Pressure Wound Therapy  10/10/24 1017   Placement Date/Time: 10/10/24 1017   Location: Buttocks  Additional Comments: SN: [id number]   NPWT Type Vacuum Therapy   Therapy Setting NPWT Continuous therapy   Pressure Setting NPWT 125 mmHg   Therapy Interventions NPWT Seal intact   Sponges Inserted NPWT 1;White;Black   Sponges Removed NPWT 1;White;Black   General Output (mL) 0     WOCN follow up for re-application of wound vac to right buttock and follow up for sacral wound, Left heel, and right lateral ankle. Discussed POC w/ pt.'s nurse jennifer. No family at bedside. Had assistance from Jaime from wound care team w/ wound vac placement. Explained reason for visit. Wound vac application change to right buttock-1 white foam, 1 black foam removed. Cleaned wound w/ vashe, 1 white foam, 1 black foam tracked to another black foam, seal intact w/ no leakage or blockage detected w/ setting at 125mmHg. Pt. Tolerated application well with no signs of pain or discomfort. Cont. Tx recs in orders for sacral and right lateral ankle. Treatment recommendations for left heel: Clean w/ vashe, dry well, apply Ca+ Ag to red/good tissue wound bed, abd pad, kerlix, and secure w/ tape. BID/Prn if soilage. Cont tx recs and orders for  sacrum (vashe wet to dry, abd pad, tape) and Right lateral ankle: clean w/ vashe, dry well, apply Ca+ Ag cloth to wound bed, gauze, secure w/ tape. Nursing to cont. Tx recs and preventative measures. Will follow up on Friday for wound vac change.     10/29/2024

## 2024-10-29 NOTE — PROGRESS NOTES
Pharmacokinetic Follow Up: Tobramycin    Assessment of levels:   Random concentration of 6.5 mcg/mL (9 hours post-infusion) corresponds to a dosing interval of every 36 hours per the [place] Nomogram    Regimen Plan:   Continue current dose: Tobramycin 510 mg IV every 36 hours      Recent Labs   Lab Result Units 10/28/24  1958   Tobramycin Trough ug/ml 6.5*       Patient brief summary:  Bianca Khan is a 59 y.o. male initiated on aminoglycoside therapy for treatment of  pneumonia    Renal Function:   Estimated Creatinine Clearance: 73.1 mL/min (based on SCr of 1.11 mg/dL).,     Dialysis Method (if applicable):  N/A    CBC (last 72 hours):  Recent Labs   Lab Result Units 10/26/24  0445 10/27/24  0509 10/28/24  0520   WBC x10(3)/mcL 11.11 12.33* 12.07*   Hgb g/dL 7.0* 8.8* 8.6*   Hct % 21.5* 27.7* 26.4*   Platelet x10(3)/mcL 411* 486* 478*   Mono % % 12.0 11.2 11.0   Eos % % 0.6 0.6 0.5   Basophil % % 0.4 0.3 0.2       Metabolic Panel (last 72 hours):  Recent Labs   Lab Result Units 10/26/24  0445 10/27/24  0509 10/28/24  0520   Sodium mmol/L 138 139 136   Potassium mmol/L 3.4* 4.1 4.1   Chloride mmol/L 102 104 103   CO2 mmol/L 29 26 27   Glucose mg/dL 127* 84 84   Blood Urea Nitrogen mg/dL 18.2 14.7 14.2   Creatinine mg/dL 1.33* 1.23 1.11       Aminoglycoside Administrations:  aminoglycosides given in last 96 hours                     tobramycin (NEBCIN) 510 mg in D5W 100 mL IVPB (mg) 510 mg New Bag 10/28/24 1122                    Microbiologic Results:  Microbiology Results (last 7 days)       Procedure Component Value Units Date/Time    Respiratory Culture [[id number]]  (Abnormal)  (Susceptibility) Collected: 10/23/24 1926    Order Status: Completed Specimen: Sputum, Expectorated Updated: 10/26/24 1141     Respiratory Culture Moderate Pseudomonas aeruginosa     Comment: with normal respiratory kasia  CRPA (Carbapenem-resistant Pseudomonas areuginosa)        GRAM STAIN Quality 1+      Many Yeast      Moderate  Gram Negative Rods      Moderate Gram positive cocci      Few Gram Positive Rods    Blood Culture [[id number]]  (Normal) Collected: 10/17/24 0942    Order Status: Completed Specimen: Blood Updated: 10/22/24 1100     Blood Culture No Growth at 5 days    Blood Culture [[id number]]  (Normal) Collected: 10/17/24 0942    Order Status: Completed Specimen: Blood Updated: 10/22/24 1100     Blood Culture No Growth at 5 days

## 2024-10-29 NOTE — PROGRESS NOTES
Infectious Diseases Progress Note  60 y/o male with past medical history of T-spine cord injury with paraplegia, diabetes, HTN, hepatitis-C, chronic MRSA L ankle wound/osteomyelitis, was on suppressive doxycycline and R knee infection/septic arthritis and osteomyelitis with GBS/Enterococcus and Ecoli isolates who presented to ER on 10/8 with increased generalized pain, increased foul smelling drainage from sacral wound with fever and chills. He was noted to be hypotensive per EMS. On admit he was afebrile without leukocytosis. ESR 98 and .80. MRSA PCR (-). U/A with 21-50 WBC, 0-5 RBC, 75 LE, no bacteria, negative nitrites. Urine culture negative. Blood cultures negative. Sacral wound culture with many Acinetobacter, many ESBL Ecoli, moderate Enterococcus, Bacteroides and Peptoniphilus. CT pelvis with contrast showed worsening inflammatory change of the sacral decubitus ulcers with gas now identified inferior to the right pubic ramus, stool noted throughout the colon as may be seen with constipation, pyelonephritis is not excluded. Seen by Surgery and underwent debridement of sacral wound. During his stay he was noted to have large amounts of bleeding from sacral wound. CT abdomen/pelvis on 10/17 showed interval development of L renal rupture with large subcapsular hematoma, L retroperitoneal hemorrhage. He received multiple blood/blood products over the past couple days. He was noted to be hypotensive and was transferred to ICU on 10/17. He was seen by Vascular and underwent Aortogram,  left renal angiography with coil embolization of left renal artery and right groin central venous line insertion on 10/17. Sputum culture on 108/23 revealing Pseudomonas. RUE venous ultrasound positive for deep  vein thrombosis in brachial and radial veins of the right upper extremity. Sputum culture with MDR Pseudomonas  He is currently on Unasyn and Cipro. Remains on chronic oral suppression with  Doxycycline    Subjective:  Lying in bed, no acute distress. Currently on 45% 15L Vapotherm. No new complaints reported. Afebrile.    Review of Systems   Constitutional:  Positive for malaise/fatigue.   HENT: Negative.     Eyes: Negative.    Respiratory:  Positive for shortness of breath.    Cardiovascular: Negative.    Gastrointestinal: Negative.    Musculoskeletal: Negative.    Skin: Negative.    Neurological:  Positive for focal weakness and weakness.   All other systems reviewed and are negative.      Review of patient's allergies indicates:   Allergen Reactions    Baclofen Itching and Anxiety       Past Medical History:   Diagnosis Date    Arthritis     Chronic ulcer of ankle 05/26/2022    ESBL (extended spectrum beta-lactamase) producing bacteria infection 08/30/2024    Frequent UTI 07/02/2019    Generalized anxiety disorder 05/26/2022    Neurogenic bladder 05/26/2022    Osteomyelitis 05/26/2022    Paraplegia     Presence of suprapubic catheter 05/26/2022    Pure hypercholesterolemia 05/26/2022    Retention of urine, unspecified 08/09/2019    Spinal cord injury at T1-T6 level 04/20/2018       Past Surgical History:   Procedure Laterality Date    ANGIOGRAM, RENAL ARTERIES, BILATERAL N/A 10/17/2024    Procedure: Angiogram, Renal Arteries, Bilateral;  Surgeon: Pati King MD;  Location: [facility] CATH LAB;  Service: Peripheral Vascular;  Laterality: N/A;  left renal artery coiling    APPLICATION OF WOUND VACUUM-ASSISTED CLOSURE DEVICE N/A 10/10/2024    Procedure: APPLICATION, WOUND VAC;  Surgeon: Rob Sinclair MD;  Location: [facility] OR;  Service: General;  Laterality: N/A;    EGD, WITH CLOSED BIOPSY N/A 8/29/2024    Procedure: EGD, WITH CLOSED BIOPSY;  Surgeon: Mikal Segovia MD;  Location: [facility] ENDOSCOPY;  Service: Gastroenterology;  Laterality: N/A;    ESOPHAGOGASTRODUODENOSCOPY N/A 8/29/2024    Procedure: EGD;  Surgeon: Mikal Segovia MD;  Location: [facility] ENDOSCOPY;  Service: Gastroenterology;   Laterality: N/A;    FRACTURE SURGERY      INCISION AND DRAINAGE, LOWER EXTREMITY Right 2023    Procedure: INCISION AND DRAINAGE, LOWER EXTREMITY;  Surgeon: Prabhu Shen DO;  Location: [facility] OR;  Service: Orthopedics;  Laterality: Right;  supine bone foam wash stuff cultures    INCISION AND DRAINAGE, LOWER EXTREMITY Right 2023    Procedure: INCISION AND DRAINAGE, LOWER EXTREMITY;  Surgeon: Prabhu Shen DO;  Location: [facility] OR;  Service: Orthopedics;  Laterality: Right;  supine any table bone foam wash stuff possible wound vac    INCISION AND DRAINAGE, LOWER EXTREMITY Right 2023    Procedure: INCISION AND DRAINAGE, LOWER EXTREMITY;  Surgeon: Prabhu Shen DO;  Location: [facility] OR;  Service: Orthopedics;  Laterality: Right;  supine bone foam vascular bed removal of distal femoral plate, wash stuff, possible AKA    INSERTION OF INTRAMEDULLARY MARISA Right 08/10/2022    Procedure: INSERTION, INTRAMEDULLARY MARISA RIGHT TIBIA;  Surgeon: Jorge Orellana MD;  Location: [facility] OR;  Service: Orthopedics;  Laterality: Right;    JOINT REPLACEMENT      Ankel    PRESSURE ULCER DEBRIDEMENT N/A 10/10/2024    Procedure: DEBRIDEMENT, PRESSURE ULCER;  Surgeon: Rob Sinclair MD;  Location: [facility] OR;  Service: General;  Laterality: N/A;  sacral wound, R buttock wound    REMOVAL OF HARDWARE FROM LOWER EXTREMITY Right 2023    Procedure: REMOVAL, HARDWARE, LOWER EXTREMITY;  Surgeon: Prabhu Shen DO;  Location: [facility] OR;  Service: Orthopedics;  Laterality: Right;    SPINE SURGERY  2018       Social History     Socioeconomic History    Marital status:    Tobacco Use    Smoking status: Some Days     Current packs/day: 0.00     Average packs/day: 0.2 packs/day for 41.5 years (10.4 ttl pk-yrs)     Types: Cigars, Cigarettes     Start date: 1982     Last attempt to quit: 2023     Years since quittin.2    Smokeless tobacco: Never   Substance and Sexual Activity    Alcohol use: Not Currently      Alcohol/week: 2.0 standard drinks of alcohol     Types: 2 Cans of beer per week    Drug use: Not Currently     Types: Oxycodone    Sexual activity: Not Currently     Partners: Female     Birth control/protection: None     Social Drivers of Health     Financial Resource Strain: Low Risk  (10/10/2024)    Overall Financial Resource Strain (CARDIA)     Difficulty of Paying Living Expenses: Not hard at all   Food Insecurity: No Food Insecurity (10/10/2024)    Hunger Vital Sign     Worried About Running Out of Food in the Last Year: Never true     Ran Out of Food in the Last Year: Never true   Transportation Needs: No Transportation Needs (10/10/2024)    TRANSPORTATION NEEDS     Transportation : No   Physical Activity: Inactive (12/11/2023)    Exercise Vital Sign     Days of Exercise per Week: 0 days     Minutes of Exercise per Session: 0 min   Stress: No Stress Concern Present (10/10/2024)    [employer] [employer] - Occupational Stress Questionnaire     Feeling of Stress : Only a little   Housing Stability: Low Risk  (10/10/2024)    Housing Stability Vital Sign     Unable to Pay for Housing in the Last Year: No     Homeless in the Last Year: No       Scheduled Meds:   ampicillin-sulbactam  3 g Intravenous Q6H    atorvastatin  20 mg Per NG tube Nightly    calcium gluconate 1 g  1 g Intravenous Once    carvediloL  25 mg Oral BID    ciprofloxacin  400 mg Intravenous Q12H    doxycycline  100 mg Oral Q12H    enoxparin  1 mg/kg Subcutaneous Q12H (treatment, non-standard time)    fenofibrate  48 mg Per NG tube Daily    FLUoxetine  20 mg Per G Tube Daily    gabapentin  400 mg Per NG tube Q8H    lactulose 10 gram/15 ml  15 g Oral TID    NIFEdipine  60 mg Oral Daily    oxybutynin  5 mg Per NG tube TID    pantoprazole  40 mg Intravenous Daily    senna-docusate 8.6-50 mg  1 tablet Oral BID    sodium chloride 0.9%  10 mL Intravenous Q6H     Continuous Infusions:      PRN Meds:  Current Facility-Administered  "Medications:     0.9%  NaCl infusion (for blood administration), , Intravenous, Q24H PRN    acetaminophen, 650 mg, Oral, Q4H PRN    albuterol-ipratropium, 3 mL, Nebulization, Q4H PRN    aluminum-magnesium hydroxide-simethicone, 30 mL, Oral, QID PRN    dextrose 10%, 12.5 g, Intravenous, PRN    dextrose 10%, 25 g, Intravenous, PRN    diphenhydrAMINE, 50 mg, Intravenous, Q6H PRN    etomidate, , Intravenous, Code/trauma/sedation Med    glucagon (human recombinant), 1 mg, Intramuscular, PRN    glucose, 16 g, Oral, PRN    glucose, 24 g, Oral, PRN    guaiFENesin 100 mg/5 ml, 200 mg, Oral, Q4H PRN    hydrALAZINE, 20 mg, Intravenous, Q4H PRN    hyoscyamine, 0.125 mg, Sublingual, Q4H PRN    insulin aspart U-100, 1-10 Units, Subcutaneous, QID (AC + HS) PRN    methocarbamoL, 750 mg, Oral, TID PRN    morphine, 2 mg, Intravenous, BID PRN    ondansetron, 4 mg, Intravenous, Q4H PRN    oxyCODONE-acetaminophen, 1 tablet, Oral, Q4H PRN    polyethylene glycol, 17 g, Oral, BID PRN    prochlorperazine, 5 mg, Intravenous, Q6H PRN    rocuronium, , Intravenous, Code/trauma/sedation Med    sodium chloride 0.9%, 10 mL, Intravenous, PRN    Flushing PICC/Midline Protocol, , , Until Discontinued **AND** sodium chloride 0.9%, 10 mL, Intravenous, Q6H **AND** sodium chloride 0.9%, 10 mL, Intravenous, PRN    tobramycin - pharmacy to dose, , Intravenous, pharmacy to manage frequency    traZODone, 200 mg, Oral, Nightly PRN    Objective:  BP (!) 140/83   Pulse 87   Temp 97.9 °F (36.6 °C) (Oral)   Resp 18   Ht 5' 10" (1.778 m)   Wt 79.3 kg (174 lb 13.2 oz)   SpO2 (!) 94%   BMI 25.08 kg/m²     Physical Exam:   Physical Exam  Vitals reviewed.   HENT:      Head: Normocephalic.   Cardiovascular:      Rate and Rhythm: Normal rate and regular rhythm.   Pulmonary:      Effort: Pulmonary effort is normal. No respiratory distress.      Breath sounds: Normal breath sounds. No wheezing.      Comments: Currently on 45% 15L Vapotherm  Abdominal:      General: " Bowel sounds are normal. There is no distension.      Palpations: Abdomen is soft.      Tenderness: There is no abdominal tenderness.   Genitourinary:     Comments: Suprapubic catheter  Musculoskeletal:      Cervical back: Normal range of motion.      Comments: Paraplegia   Skin:     Findings: No rash.      Comments: Wounds dressed   Neurological:      Mental Status: He is alert and oriented to person, place, and time.   Psychiatric:         Behavior: Behavior normal.     Imaging    Lab Review   Recent Results (from the past 24 hours)   CBC with Differential    Collection Time: 10/28/24  5:20 AM   Result Value Ref Range    WBC 12.07 (H) 4.50 - 11.50 x10(3)/mcL    RBC 3.07 (L) 4.70 - 6.10 x10(6)/mcL    Hgb 8.6 (L) 14.0 - 18.0 g/dL    Hct 26.4 (L) 42.0 - 52.0 %    MCV 86.0 80.0 - 94.0 fL    MCH 28.0 27.0 - 31.0 pg    MCHC 32.6 (L) 33.0 - 36.0 g/dL    RDW 15.9 11.5 - 17.0 %    Platelet 478 (H) 130 - 400 x10(3)/mcL    MPV 9.2 7.4 - 10.4 fL    Neut % 75.6 %    Lymph % 11.5 %    Mono % 11.0 %    Eos % 0.5 %    Basophil % 0.2 %    Lymph # 1.39 0.6 - 4.6 x10(3)/mcL    Neut # 9.13 2.1 - 9.2 x10(3)/mcL    Mono # 1.33 (H) 0.1 - 1.3 x10(3)/mcL    Eos # 0.06 0 - 0.9 x10(3)/mcL    Baso # 0.02 <=0.2 x10(3)/mcL    IG# 0.14 (H) 0 - 0.04 x10(3)/mcL    IG% 1.2 %    NRBC% 0.2 %   Basic Metabolic Panel    Collection Time: 10/28/24  5:20 AM   Result Value Ref Range    Sodium 136 136 - 145 mmol/L    Potassium 4.1 3.5 - 5.1 mmol/L    Chloride 103 98 - 107 mmol/L    CO2 27 23 - 31 mmol/L    Glucose 84 82 - 115 mg/dL    Blood Urea Nitrogen 14.2 8.4 - 25.7 mg/dL    Creatinine 1.11 0.72 - 1.25 mg/dL    BUN/Creatinine Ratio 13     Calcium 8.2 (L) 8.8 - 10.0 mg/dL    Anion Gap 6.0 mEq/L    eGFR >60 mL/min/1.73/m2   POCT glucose    Collection Time: 10/28/24 11:20 AM   Result Value Ref Range    POCT Glucose 90 70 - 110 mg/dL   POCT glucose    Collection Time: 10/28/24  4:27 PM   Result Value Ref Range    POCT Glucose 82 70 - 110 mg/dL        Assessment/Plan:  Sepsis - resolved  Sacral wound infection - CR Acinetobacter / ESBL Ecoli / Enterococcus / Bacteroides / Peptoniphilus isolates  Pneumonitis / Pleural effusions  MDR Pseudomonas (+) sputum  ANTON  Pyelonephritis per CT   Constipation  Paraplegia  Neurogenic bladder with suprapubic catheter  DM Type II  MRSA L ankle osteomyelitis with retained hardware  Anemia    -Continue Unasyn #15, Cipro #15 and add Tobramycin #1/7  -Has been unable to get MRI R hip due to clinical status. Will extend antibiotics course another 2 weeks with end date 11/11  -Remains afebrile with leukocytosis about the same, follow  -Currently on 45% 15L Vapotherm, wean as tolerated  -Repeat CXR on 10/26 with pulmonary edema, bilateral effusions, lower lobe atelectasis unchanged.   -CXR on 10/21 with B/L pleural effusions with associated atelectasis and/or infiltrate  -Sputum culture on 10/23 with moderate Pseudomonas species, follow  -Pt with significant anemia, CT abdomen/pelvis showed L renal rupture with large subcapsular hematoma  -Seen by Vascular, inputs noted. S/P aortogram, left renal angiography with coil embolization of left renal artery on 10/17  -Received multiple units of blood/blood products during his stay  -CT pelvis with contrast showed worsening inflammatory change of sacral ulcer with gas inferior to the R pubic ramus  -Seen by Surgery, inputs noted  -S/P debridement of sacral wound on 10/10  -Sacral wound cultures revealing CR Acinetobacter, ESBL Ecoli, Enterococcus, Bacteroides, Peptoniphilus isolates  -Continue wound care  -Blood cultures negative   -Discussed with patient and nursing staff. Pt is now a DNR. CM working on LTAC placement

## 2024-10-29 NOTE — PROCEDURES
TUNNELED CENTRAL VENOUS LINE INSERTION:    Indications: vascular access and med administration    Antisepsis: sterile gloves, mask, gown, and drape.  Skin prepped with chlorhexidine.  Catheter: other (7F Cuffed Double Lumen Lu Catheter) via right internal jugular.  Insertion directed by ultrasound.  Heme positive aspiration all ports.  Catheter Tunneled and Secured with sutures.  Dressing: dry sterile gauze and bio occlusive dressing.  Complications: none.    Saranya Jiménez  [employer] General Surgery, PGY4

## 2024-10-29 NOTE — PLAN OF CARE
Spoke to Felipa with TCU- she is following pt- maintains pt would have to be off vapotherm before transfer to TCU.  Pt refusing LTAC or any other facility.

## 2024-10-30 LAB
ALBUMIN SERPL-MCNC: 1.8 G/DL (ref 3.4–4.8)
ALBUMIN/GLOB SERPL: 0.4 RATIO (ref 1.1–2)
ALP SERPL-CCNC: 83 UNIT/L (ref 40–150)
ALT SERPL-CCNC: 14 UNIT/L (ref 0–55)
ANION GAP SERPL CALC-SCNC: 7 MEQ/L
AST SERPL-CCNC: 23 UNIT/L (ref 5–34)
BASOPHILS # BLD AUTO: 0.01 X10(3)/MCL
BASOPHILS NFR BLD AUTO: 0.1 %
BILIRUB SERPL-MCNC: 1.1 MG/DL
BUN SERPL-MCNC: 16.8 MG/DL (ref 8.4–25.7)
CALCIUM SERPL-MCNC: 8.4 MG/DL (ref 8.8–10)
CHLORIDE SERPL-SCNC: 98 MMOL/L (ref 98–107)
CO2 SERPL-SCNC: 28 MMOL/L (ref 23–31)
CREAT SERPL-MCNC: 1.26 MG/DL (ref 0.72–1.25)
CREAT/UREA NIT SERPL: 13
EOSINOPHIL # BLD AUTO: 0.02 X10(3)/MCL (ref 0–0.9)
EOSINOPHIL NFR BLD AUTO: 0.2 %
ERYTHROCYTE [DISTWIDTH] IN BLOOD BY AUTOMATED COUNT: 15.7 % (ref 11.5–17)
GFR SERPLBLD CREATININE-BSD FMLA CKD-EPI: >60 ML/MIN/1.73/M2
GLOBULIN SER-MCNC: 5 GM/DL (ref 2.4–3.5)
GLUCOSE SERPL-MCNC: 87 MG/DL (ref 82–115)
HCT VFR BLD AUTO: 26.2 % (ref 42–52)
HGB BLD-MCNC: 8.6 G/DL (ref 14–18)
IMM GRANULOCYTES # BLD AUTO: 0.1 X10(3)/MCL (ref 0–0.04)
IMM GRANULOCYTES NFR BLD AUTO: 0.9 %
LYMPHOCYTES # BLD AUTO: 1.31 X10(3)/MCL (ref 0.6–4.6)
LYMPHOCYTES NFR BLD AUTO: 11.5 %
MCH RBC QN AUTO: 27.7 PG (ref 27–31)
MCHC RBC AUTO-ENTMCNC: 32.8 G/DL (ref 33–36)
MCV RBC AUTO: 84.5 FL (ref 80–94)
MONOCYTES # BLD AUTO: 1.14 X10(3)/MCL (ref 0.1–1.3)
MONOCYTES NFR BLD AUTO: 10 %
NEUTROPHILS # BLD AUTO: 8.77 X10(3)/MCL (ref 2.1–9.2)
NEUTROPHILS NFR BLD AUTO: 77.3 %
NRBC BLD AUTO-RTO: 0 %
PLATELET # BLD AUTO: 578 X10(3)/MCL (ref 130–400)
PMV BLD AUTO: 9.2 FL (ref 7.4–10.4)
POTASSIUM SERPL-SCNC: 3.6 MMOL/L (ref 3.5–5.1)
PROT SERPL-MCNC: 6.8 GM/DL (ref 5.8–7.6)
RBC # BLD AUTO: 3.1 X10(6)/MCL (ref 4.7–6.1)
SODIUM SERPL-SCNC: 133 MMOL/L (ref 136–145)
TOBRAMYCIN TROUGH SERPL-MCNC: 0.8 UG/ML (ref 0.3–2)
WBC # BLD AUTO: 11.35 X10(3)/MCL (ref 4.5–11.5)

## 2024-10-30 PROCEDURE — 63600175 PHARM REV CODE 636 W HCPCS: Performed by: STUDENT IN AN ORGANIZED HEALTH CARE EDUCATION/TRAINING PROGRAM

## 2024-10-30 PROCEDURE — 36415 COLL VENOUS BLD VENIPUNCTURE: CPT | Performed by: STUDENT IN AN ORGANIZED HEALTH CARE EDUCATION/TRAINING PROGRAM

## 2024-10-30 PROCEDURE — 25000003 PHARM REV CODE 250: Performed by: INTERNAL MEDICINE

## 2024-10-30 PROCEDURE — 94760 N-INVAS EAR/PLS OXIMETRY 1: CPT

## 2024-10-30 PROCEDURE — 94664 DEMO&/EVAL PT USE INHALER: CPT

## 2024-10-30 PROCEDURE — 99900031 HC PATIENT EDUCATION (STAT)

## 2024-10-30 PROCEDURE — S0179 MEGESTROL 20 MG: HCPCS | Performed by: STUDENT IN AN ORGANIZED HEALTH CARE EDUCATION/TRAINING PROGRAM

## 2024-10-30 PROCEDURE — 25000003 PHARM REV CODE 250: Performed by: STUDENT IN AN ORGANIZED HEALTH CARE EDUCATION/TRAINING PROGRAM

## 2024-10-30 PROCEDURE — 99900035 HC TECH TIME PER 15 MIN (STAT)

## 2024-10-30 PROCEDURE — 25000003 PHARM REV CODE 250: Performed by: NURSE PRACTITIONER

## 2024-10-30 PROCEDURE — A4216 STERILE WATER/SALINE, 10 ML: HCPCS | Performed by: STUDENT IN AN ORGANIZED HEALTH CARE EDUCATION/TRAINING PROGRAM

## 2024-10-30 PROCEDURE — 27000207 HC ISOLATION

## 2024-10-30 PROCEDURE — 21400001 HC TELEMETRY ROOM

## 2024-10-30 PROCEDURE — 63600175 PHARM REV CODE 636 W HCPCS: Performed by: INTERNAL MEDICINE

## 2024-10-30 PROCEDURE — 85025 COMPLETE CBC W/AUTO DIFF WBC: CPT | Performed by: STUDENT IN AN ORGANIZED HEALTH CARE EDUCATION/TRAINING PROGRAM

## 2024-10-30 PROCEDURE — 80053 COMPREHEN METABOLIC PANEL: CPT | Performed by: STUDENT IN AN ORGANIZED HEALTH CARE EDUCATION/TRAINING PROGRAM

## 2024-10-30 PROCEDURE — 27000221 HC OXYGEN, UP TO 24 HOURS

## 2024-10-30 RX ORDER — MEGESTROL ACETATE 40 MG/ML
200 SUSPENSION ORAL DAILY
Status: DISCONTINUED | OUTPATIENT
Start: 2024-10-30 | End: 2024-11-06

## 2024-10-30 RX ORDER — FUROSEMIDE 10 MG/ML
20 INJECTION INTRAMUSCULAR; INTRAVENOUS ONCE
Status: COMPLETED | OUTPATIENT
Start: 2024-10-30 | End: 2024-10-30

## 2024-10-30 RX ADMIN — OXYCODONE AND ACETAMINOPHEN 1 TABLET: 10; 325 TABLET ORAL at 12:10

## 2024-10-30 RX ADMIN — PANTOPRAZOLE SODIUM 40 MG: 40 INJECTION, POWDER, LYOPHILIZED, FOR SOLUTION INTRAVENOUS at 08:10

## 2024-10-30 RX ADMIN — CARVEDILOL 25 MG: 12.5 TABLET, FILM COATED ORAL at 08:10

## 2024-10-30 RX ADMIN — FENOFIBRATE 48 MG: 48 TABLET, FILM COATED ORAL at 08:10

## 2024-10-30 RX ADMIN — AMPICILLIN SODIUM AND SULBACTAM SODIUM 3 G: 2; 1 INJECTION, POWDER, FOR SOLUTION INTRAMUSCULAR; INTRAVENOUS at 06:10

## 2024-10-30 RX ADMIN — MORPHINE SULFATE 2 MG: 4 INJECTION, SOLUTION INTRAMUSCULAR; INTRAVENOUS at 09:10

## 2024-10-30 RX ADMIN — CIPROFLOXACIN 400 MG: 2 INJECTION, SOLUTION INTRAVENOUS at 09:10

## 2024-10-30 RX ADMIN — SODIUM CHLORIDE, PRESERVATIVE FREE 10 ML: 5 INJECTION INTRAVENOUS at 11:10

## 2024-10-30 RX ADMIN — DOXYCYCLINE HYCLATE 100 MG: 100 TABLET, COATED ORAL at 08:10

## 2024-10-30 RX ADMIN — AMPICILLIN SODIUM AND SULBACTAM SODIUM 3 G: 2; 1 INJECTION, POWDER, FOR SOLUTION INTRAMUSCULAR; INTRAVENOUS at 11:10

## 2024-10-30 RX ADMIN — OXYBUTYNIN CHLORIDE 5 MG: 5 TABLET ORAL at 02:10

## 2024-10-30 RX ADMIN — FLUOXETINE HYDROCHLORIDE 20 MG: 20 CAPSULE ORAL at 08:10

## 2024-10-30 RX ADMIN — GABAPENTIN 400 MG: 300 CAPSULE ORAL at 09:10

## 2024-10-30 RX ADMIN — ENOXAPARIN SODIUM 80 MG: 80 INJECTION SUBCUTANEOUS at 11:10

## 2024-10-30 RX ADMIN — MEGESTROL ACETATE 200 MG: 400 SUSPENSION ORAL at 12:10

## 2024-10-30 RX ADMIN — AMPICILLIN SODIUM AND SULBACTAM SODIUM 3 G: 2; 1 INJECTION, POWDER, FOR SOLUTION INTRAMUSCULAR; INTRAVENOUS at 05:10

## 2024-10-30 RX ADMIN — METHOCARBAMOL 750 MG: 750 TABLET ORAL at 08:10

## 2024-10-30 RX ADMIN — NIFEDIPINE 60 MG: 60 TABLET, FILM COATED, EXTENDED RELEASE ORAL at 08:10

## 2024-10-30 RX ADMIN — OXYBUTYNIN CHLORIDE 5 MG: 5 TABLET ORAL at 08:10

## 2024-10-30 RX ADMIN — TRAZODONE HYDROCHLORIDE 200 MG: 100 TABLET ORAL at 09:10

## 2024-10-30 RX ADMIN — OXYCODONE AND ACETAMINOPHEN 1 TABLET: 10; 325 TABLET ORAL at 07:10

## 2024-10-30 RX ADMIN — GABAPENTIN 400 MG: 300 CAPSULE ORAL at 06:10

## 2024-10-30 RX ADMIN — SODIUM CHLORIDE, PRESERVATIVE FREE 10 ML: 5 INJECTION INTRAVENOUS at 05:10

## 2024-10-30 RX ADMIN — ATORVASTATIN CALCIUM 20 MG: 10 TABLET, FILM COATED ORAL at 08:10

## 2024-10-30 RX ADMIN — AMPICILLIN SODIUM AND SULBACTAM SODIUM 3 G: 2; 1 INJECTION, POWDER, FOR SOLUTION INTRAMUSCULAR; INTRAVENOUS at 12:10

## 2024-10-30 RX ADMIN — ENOXAPARIN SODIUM 80 MG: 80 INJECTION SUBCUTANEOUS at 12:10

## 2024-10-30 RX ADMIN — FUROSEMIDE 20 MG: 10 INJECTION, SOLUTION INTRAMUSCULAR; INTRAVENOUS at 11:10

## 2024-10-30 RX ADMIN — GABAPENTIN 400 MG: 300 CAPSULE ORAL at 02:10

## 2024-10-30 RX ADMIN — SENNOSIDES AND DOCUSATE SODIUM 1 TABLET: 50; 8.6 TABLET ORAL at 08:10

## 2024-10-30 RX ADMIN — OXYCODONE AND ACETAMINOPHEN 1 TABLET: 10; 325 TABLET ORAL at 05:10

## 2024-10-30 NOTE — PROGRESS NOTES
"Pharmacokinetic Follow Up: Tobramycin    Assessment of levels:   Trough prior to second dose is at goal of < 1 mcg/mL at 0.8 mcg/mL.    Regimen Plan:   Will continue current dosing regimen of 510 mg every 36 hours. The next level is scheduled to be drawn 60 minutes prior to the dose on 11/3 at 1000.    Drug levels (last 3 results):  No results for input(s): "AMIKACINPEAK", "AMIKACINTROU", "AMIKACINRAND", "AMIKACIN" in the last 72 hours.    No results for input(s): "GENTAMICIN", "GENTPEAK", "GENTTROUGH", "GENT10", "GENT12", "GENT8", "GENTRANDOM" in the last 72 hours.    Recent Labs   Lab Result Units 10/28/24  1958 10/29/24  2137   Tobramycin Trough ug/ml 6.5* 0.8       Aminoglycoside Administrations:  aminoglycosides given in last 96 hours                     tobramycin (NEBCIN) 510 mg in D5W 100 mL IVPB (mg) 510 mg New Bag 10/29/24 2357    tobramycin (NEBCIN) 510 mg in D5W 100 mL IVPB (mg) 510 mg New Bag 10/28/24 1122                    Pharmacy will continue to monitor.    Please contact pharmacy at extension [phone] with any questions regarding this assessment.    Thank you for the consult,   Yassine Tucker      Patient brief summary:  Bianca Khan is a 59 y.o. male initiated on aminoglycoside therapy for treatment of lower respiratory infection    Drug Allergies:   Review of patient's allergies indicates:   Allergen Reactions    Baclofen Itching and Anxiety       Weights:   Wt Readings from Last 1 Encounters:   10/17/24 79.3 kg (174 lb 13.2 oz)     Ideal body weight: 73 kg (160 lb 15 oz)  Adjusted ideal body weight: 75.5 kg (166 lb 7.9 oz)    Renal Function:   Estimated Creatinine Clearance: 67 mL/min (based on SCr of 1.21 mg/dL).    CBC (last 72 hours):  Recent Labs   Lab Result Units 10/27/24  0509 10/28/24  0520 10/29/24  1042   WBC x10(3)/mcL 12.33* 12.07* 13.49*   Hgb g/dL 8.8* 8.6* 9.8*   Hct % 27.7* 26.4* 31.1*   Platelet x10(3)/mcL 486* 478* 486*   Mono % % 11.2 11.0 7.9   Eos % % 0.6 0.5 0.1   Basophil % % " 0.3 0.2 0.2       Metabolic Panel (last 72 hours):  Recent Labs   Lab Result Units 10/27/24  0509 10/28/24  0520 10/29/24  1042   Sodium mmol/L 139 136 134*   Potassium mmol/L 4.1 4.1 4.4   Chloride mmol/L 104 103 103   CO2 mmol/L 26 27 22*   Glucose mg/dL 84 84 109   Blood Urea Nitrogen mg/dL 14.7 14.2 13.7   Creatinine mg/dL 1.23 1.11 1.21   Albumin g/dL  --   --  1.9*   Bilirubin Total mg/dL  --   --  1.0   ALP unit/L  --   --  87   AST unit/L  --   --  26   ALT unit/L  --   --  18       Microbiologic Results:  Microbiology Results (last 7 days)       Procedure Component Value Units Date/Time    Respiratory Culture [[id number]]  (Abnormal)  (Susceptibility) Collected: 10/23/24 1926    Order Status: Completed Specimen: Sputum, Expectorated Updated: 10/26/24 1141     Respiratory Culture Moderate Pseudomonas aeruginosa     Comment: with normal respiratory kasia  CRPA (Carbapenem-resistant Pseudomonas areuginosa)        GRAM STAIN Quality 1+      Many Yeast      Moderate Gram Negative Rods      Moderate Gram positive cocci      Few Gram Positive Rods

## 2024-10-30 NOTE — PROGRESS NOTES
Infectious Diseases Progress Note  58 y/o male with past medical history of T-spine cord injury with paraplegia, diabetes, HTN, hepatitis-C, chronic MRSA L ankle wound/osteomyelitis, was on suppressive doxycycline and R knee infection/septic arthritis and osteomyelitis with GBS/Enterococcus and Ecoli isolates who presented to ER on 10/8 with increased generalized pain, increased foul smelling drainage from sacral wound with fever and chills. He was noted to be hypotensive per EMS. On admit he was afebrile without leukocytosis. ESR 98 and .80. MRSA PCR (-). U/A with 21-50 WBC, 0-5 RBC, 75 LE, no bacteria, negative nitrites. Urine culture negative. Blood cultures negative. Sacral wound culture with many Acinetobacter, many ESBL Ecoli, moderate Enterococcus, Bacteroides and Peptoniphilus. CT pelvis with contrast showed worsening inflammatory change of the sacral decubitus ulcers with gas now identified inferior to the right pubic ramus, stool noted throughout the colon as may be seen with constipation, pyelonephritis is not excluded. Seen by Surgery and underwent debridement of sacral wound. During his stay he was noted to have large amounts of bleeding from sacral wound. CT abdomen/pelvis on 10/17 showed interval development of L renal rupture with large subcapsular hematoma, L retroperitoneal hemorrhage. He received multiple blood/blood products over the past couple days. He was noted to be hypotensive and was transferred to ICU on 10/17. He was seen by Vascular and underwent Aortogram,  left renal angiography with coil embolization of left renal artery and right groin central venous line insertion on 10/17. Sputum culture on 108/23 revealing Pseudomonas. RUE venous ultrasound positive for deep  vein thrombosis in brachial and radial veins of the right upper extremity. Sputum culture with MDR Pseudomonas  He is currently on Unasyn, Cipro and Tobramycin. Remains on chronic oral suppression with  Doxycycline    Subjective:  Lying in bed, no acute distress. Currently on 40% 15L Vapotherm. No new complaints reported. Afebrile.    Review of Systems   Constitutional:  Positive for malaise/fatigue.   HENT: Negative.     Eyes: Negative.    Respiratory:  Positive for shortness of breath.    Cardiovascular: Negative.    Gastrointestinal: Negative.    Musculoskeletal: Negative.    Skin: Negative.    Neurological:  Positive for focal weakness and weakness.   All other systems reviewed and are negative.      Review of patient's allergies indicates:   Allergen Reactions    Baclofen Itching and Anxiety       Past Medical History:   Diagnosis Date    Arthritis     Chronic ulcer of ankle 05/26/2022    ESBL (extended spectrum beta-lactamase) producing bacteria infection 08/30/2024    Frequent UTI 07/02/2019    Generalized anxiety disorder 05/26/2022    Neurogenic bladder 05/26/2022    Osteomyelitis 05/26/2022    Paraplegia     Presence of suprapubic catheter 05/26/2022    Pure hypercholesterolemia 05/26/2022    Retention of urine, unspecified 08/09/2019    Spinal cord injury at T1-T6 level 04/20/2018       Past Surgical History:   Procedure Laterality Date    ANGIOGRAM, RENAL ARTERIES, BILATERAL N/A 10/17/2024    Procedure: Angiogram, Renal Arteries, Bilateral;  Surgeon: Pati King MD;  Location: [facility] CATH LAB;  Service: Peripheral Vascular;  Laterality: N/A;  left renal artery coiling    APPLICATION OF WOUND VACUUM-ASSISTED CLOSURE DEVICE N/A 10/10/2024    Procedure: APPLICATION, WOUND VAC;  Surgeon: Rob Sinclair MD;  Location: [facility] OR;  Service: General;  Laterality: N/A;    EGD, WITH CLOSED BIOPSY N/A 8/29/2024    Procedure: EGD, WITH CLOSED BIOPSY;  Surgeon: Mikal Segovia MD;  Location: [facility] ENDOSCOPY;  Service: Gastroenterology;  Laterality: N/A;    ESOPHAGOGASTRODUODENOSCOPY N/A 8/29/2024    Procedure: EGD;  Surgeon: Mikal Segovia MD;  Location: [facility] ENDOSCOPY;  Service: Gastroenterology;   Laterality: N/A;    FRACTURE SURGERY      INCISION AND DRAINAGE, LOWER EXTREMITY Right 2023    Procedure: INCISION AND DRAINAGE, LOWER EXTREMITY;  Surgeon: Prabhu Shen DO;  Location: [facility] OR;  Service: Orthopedics;  Laterality: Right;  supine bone foam wash stuff cultures    INCISION AND DRAINAGE, LOWER EXTREMITY Right 2023    Procedure: INCISION AND DRAINAGE, LOWER EXTREMITY;  Surgeon: Prabhu Shen DO;  Location: [facility] OR;  Service: Orthopedics;  Laterality: Right;  supine any table bone foam wash stuff possible wound vac    INCISION AND DRAINAGE, LOWER EXTREMITY Right 2023    Procedure: INCISION AND DRAINAGE, LOWER EXTREMITY;  Surgeon: Prabhu Shen DO;  Location: [facility] OR;  Service: Orthopedics;  Laterality: Right;  supine bone foam vascular bed removal of distal femoral plate, wash stuff, possible AKA    INSERTION OF INTRAMEDULLARY MARISA Right 08/10/2022    Procedure: INSERTION, INTRAMEDULLARY MARISA RIGHT TIBIA;  Surgeon: Jorge Orellana MD;  Location: [facility] OR;  Service: Orthopedics;  Laterality: Right;    JOINT REPLACEMENT      Ankel    PRESSURE ULCER DEBRIDEMENT N/A 10/10/2024    Procedure: DEBRIDEMENT, PRESSURE ULCER;  Surgeon: Rob Sinclair MD;  Location: [facility] OR;  Service: General;  Laterality: N/A;  sacral wound, R buttock wound    REMOVAL OF HARDWARE FROM LOWER EXTREMITY Right 2023    Procedure: REMOVAL, HARDWARE, LOWER EXTREMITY;  Surgeon: Prabhu Shen DO;  Location: [facility] OR;  Service: Orthopedics;  Laterality: Right;    SPINE SURGERY  2018       Social History     Socioeconomic History    Marital status:    Tobacco Use    Smoking status: Some Days     Current packs/day: 0.00     Average packs/day: 0.2 packs/day for 41.5 years (10.4 ttl pk-yrs)     Types: Cigars, Cigarettes     Start date: 1982     Last attempt to quit: 2023     Years since quittin.2    Smokeless tobacco: Never   Substance and Sexual Activity    Alcohol use: Not Currently      Alcohol/week: 2.0 standard drinks of alcohol     Types: 2 Cans of beer per week    Drug use: Not Currently     Types: Oxycodone    Sexual activity: Not Currently     Partners: Female     Birth control/protection: None     Social Drivers of Health     Financial Resource Strain: Low Risk  (10/10/2024)    Overall Financial Resource Strain (CARDIA)     Difficulty of Paying Living Expenses: Not hard at all   Food Insecurity: No Food Insecurity (10/10/2024)    Hunger Vital Sign     Worried About Running Out of Food in the Last Year: Never true     Ran Out of Food in the Last Year: Never true   Transportation Needs: No Transportation Needs (10/10/2024)    TRANSPORTATION NEEDS     Transportation : No   Physical Activity: Inactive (12/11/2023)    Exercise Vital Sign     Days of Exercise per Week: 0 days     Minutes of Exercise per Session: 0 min   Stress: No Stress Concern Present (10/10/2024)    Citizen of [place] [employer] - Occupational Stress Questionnaire     Feeling of Stress : Only a little   Housing Stability: Low Risk  (10/10/2024)    Housing Stability Vital Sign     Unable to Pay for Housing in the Last Year: No     Homeless in the Last Year: No       Scheduled Meds:   ampicillin-sulbactam  3 g Intravenous Q6H    atorvastatin  20 mg Per NG tube Nightly    calcium gluconate 1 g  1 g Intravenous Once    carvediloL  25 mg Oral BID    ciprofloxacin  400 mg Intravenous Q12H    doxycycline  100 mg Oral Q12H    enoxparin  1 mg/kg Subcutaneous Q12H (treatment, non-standard time)    fenofibrate  48 mg Per NG tube Daily    FLUoxetine  20 mg Per G Tube Daily    gabapentin  400 mg Per NG tube Q8H    lactulose 10 gram/15 ml  15 g Oral TID    NIFEdipine  60 mg Oral Daily    oxybutynin  5 mg Per NG tube TID    pantoprazole  40 mg Intravenous Daily    senna-docusate 8.6-50 mg  1 tablet Oral BID    sodium chloride 0.9%  10 mL Intravenous Q6H    tobramycin IV (PEDS and ADULTS)  7 mg/kg (Ideal) Intravenous Q36H  "    Continuous Infusions:      PRN Meds:  Current Facility-Administered Medications:     0.9%  NaCl infusion (for blood administration), , Intravenous, Q24H PRN    acetaminophen, 650 mg, Oral, Q4H PRN    albuterol-ipratropium, 3 mL, Nebulization, Q4H PRN    aluminum-magnesium hydroxide-simethicone, 30 mL, Oral, QID PRN    dextrose 10%, 12.5 g, Intravenous, PRN    dextrose 10%, 25 g, Intravenous, PRN    diphenhydrAMINE, 50 mg, Intravenous, Q6H PRN    etomidate, , Intravenous, Code/trauma/sedation Med    glucagon (human recombinant), 1 mg, Intramuscular, PRN    glucose, 16 g, Oral, PRN    glucose, 24 g, Oral, PRN    guaiFENesin 100 mg/5 ml, 200 mg, Oral, Q4H PRN    hydrALAZINE, 20 mg, Intravenous, Q4H PRN    hyoscyamine, 0.125 mg, Sublingual, Q4H PRN    insulin aspart U-100, 1-10 Units, Subcutaneous, QID (AC + HS) PRN    methocarbamoL, 750 mg, Oral, TID PRN    morphine, 2 mg, Intravenous, BID PRN    ondansetron, 4 mg, Intravenous, Q4H PRN    oxyCODONE-acetaminophen, 1 tablet, Oral, Q4H PRN    polyethylene glycol, 17 g, Oral, BID PRN    prochlorperazine, 5 mg, Intravenous, Q6H PRN    rocuronium, , Intravenous, Code/trauma/sedation Med    sodium chloride 0.9%, 10 mL, Intravenous, PRN    Flushing PICC/Midline Protocol, , , Until Discontinued **AND** sodium chloride 0.9%, 10 mL, Intravenous, Q6H **AND** sodium chloride 0.9%, 10 mL, Intravenous, PRN    tobramycin - pharmacy to dose, , Intravenous, pharmacy to manage frequency    traZODone, 200 mg, Oral, Nightly PRN    Objective:  BP (!) 162/94   Pulse 89   Temp 98 °F (36.7 °C) (Oral)   Resp 18   Ht 5' 10" (1.778 m)   Wt 79.3 kg (174 lb 13.2 oz)   SpO2 97%   BMI 25.08 kg/m²     Physical Exam:   Physical Exam  Vitals reviewed.   HENT:      Head: Normocephalic.   Cardiovascular:      Rate and Rhythm: Normal rate and regular rhythm.   Pulmonary:      Effort: Pulmonary effort is normal. No respiratory distress.      Breath sounds: Normal breath sounds. No wheezing.      " Comments: Currently on 40% 15L Vapotherm  Abdominal:      General: Bowel sounds are normal. There is no distension.      Palpations: Abdomen is soft.      Tenderness: There is no abdominal tenderness.   Genitourinary:     Comments: Suprapubic catheter  Musculoskeletal:      Cervical back: Normal range of motion.      Comments: Paraplegia   Skin:     Findings: No rash.      Comments: Wounds dressed. R chest tunneled central line   Neurological:      Mental Status: He is alert and oriented to person, place, and time.   Psychiatric:         Behavior: Behavior normal.     Imaging    Lab Review   Recent Results (from the past 24 hours)   Comprehensive Metabolic Panel    Collection Time: 10/29/24 10:42 AM   Result Value Ref Range    Sodium 134 (L) 136 - 145 mmol/L    Potassium 4.4 3.5 - 5.1 mmol/L    Chloride 103 98 - 107 mmol/L    CO2 22 (L) 23 - 31 mmol/L    Glucose 109 82 - 115 mg/dL    Blood Urea Nitrogen 13.7 8.4 - 25.7 mg/dL    Creatinine 1.21 0.72 - 1.25 mg/dL    Calcium 8.0 (L) 8.8 - 10.0 mg/dL    Protein Total 6.5 5.8 - 7.6 gm/dL    Albumin 1.9 (L) 3.4 - 4.8 g/dL    Globulin 4.6 (H) 2.4 - 3.5 gm/dL    Albumin/Globulin Ratio 0.4 (L) 1.1 - 2.0 ratio    Bilirubin Total 1.0 <=1.5 mg/dL    ALP 87 40 - 150 unit/L    ALT 18 0 - 55 unit/L    AST 26 5 - 34 unit/L    eGFR >60 mL/min/1.73/m2    Anion Gap 9.0 mEq/L    BUN/Creatinine Ratio 11    CBC with Differential    Collection Time: 10/29/24 10:42 AM   Result Value Ref Range    WBC 13.49 (H) 4.50 - 11.50 x10(3)/mcL    RBC 3.43 (L) 4.70 - 6.10 x10(6)/mcL    Hgb 9.8 (L) 14.0 - 18.0 g/dL    Hct 31.1 (L) 42.0 - 52.0 %    MCV 90.7 80.0 - 94.0 fL    MCH 28.6 27.0 - 31.0 pg    MCHC 31.5 (L) 33.0 - 36.0 g/dL    RDW 15.9 11.5 - 17.0 %    Platelet 486 (H) 130 - 400 x10(3)/mcL    MPV 9.0 7.4 - 10.4 fL    Neut % 81.9 %    Lymph % 9.0 %    Mono % 7.9 %    Eos % 0.1 %    Basophil % 0.2 %    Lymph # 1.21 0.6 - 4.6 x10(3)/mcL    Neut # 11.05 (H) 2.1 - 9.2 x10(3)/mcL    Mono # 1.07 0.1 -  1.3 x10(3)/mcL    Eos # 0.01 0 - 0.9 x10(3)/mcL    Baso # 0.03 <=0.2 x10(3)/mcL    IG# 0.12 (H) 0 - 0.04 x10(3)/mcL    IG% 0.9 %    NRBC% 0.1 %   TOBRAMYCIN, TROUGH    Collection Time: 10/29/24  9:37 PM   Result Value Ref Range    Tobramycin Trough 0.8 0.3 - 2.0 ug/ml       Assessment/Plan:  Sepsis - resolved  Sacral wound infection - CR Acinetobacter / ESBL Ecoli / Enterococcus / Bacteroides / Peptoniphilus isolates  Pneumonitis / Pleural effusions  MDR Pseudomonas (+) sputum  ANTON  Pyelonephritis per CT   Constipation  Paraplegia  Neurogenic bladder with suprapubic catheter  DM Type II  MRSA L ankle osteomyelitis with retained hardware  Anemia    -Continue Unasyn #17, Cipro #17 and Tobramycin #3/7  -Has been unable to get MRI R hip due to clinical status. Will extend antibiotics course another 2 weeks with end date 11/11  -Remains afebrile with leukocytosis trending up, follow  -Currently on 40% 15L Vapotherm, wean as tolerated  -Repeat CXR on 10/26 with pulmonary edema, bilateral effusions, lower lobe atelectasis unchanged.   -CXR on 10/21 with B/L pleural effusions with associated atelectasis and/or infiltrate  -Sputum culture on 10/23 with moderate Pseudomonas species, follow  -Pt with significant anemia, CT abdomen/pelvis showed L renal rupture with large subcapsular hematoma  -Seen by Vascular, inputs noted. S/P aortogram, left renal angiography with coil embolization of left renal artery on 10/17  -Received multiple units of blood/blood products during his stay  -CT pelvis with contrast showed worsening inflammatory change of sacral ulcer with gas inferior to the R pubic ramus  -Seen by Surgery, inputs noted  -S/P debridement of sacral wound on 10/10  -Sacral wound cultures revealing CR Acinetobacter, ESBL Ecoli, Enterococcus, Bacteroides, Peptoniphilus isolates  -Continue wound care  -Blood cultures negative   -Discussed with patient and nursing staff. Pt is now a DNR. CM working on LTAC placement
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
## 2024-10-31 LAB
ALBUMIN SERPL-MCNC: 1.8 G/DL (ref 3.4–4.8)
ALBUMIN/GLOB SERPL: 0.4 RATIO (ref 1.1–2)
ALP SERPL-CCNC: 83 UNIT/L (ref 40–150)
ALT SERPL-CCNC: 13 UNIT/L (ref 0–55)
ANION GAP SERPL CALC-SCNC: 9 MEQ/L
AST SERPL-CCNC: 22 UNIT/L (ref 5–34)
BASOPHILS # BLD AUTO: 0.02 X10(3)/MCL
BASOPHILS NFR BLD AUTO: 0.2 %
BILIRUB SERPL-MCNC: 1 MG/DL
BUN SERPL-MCNC: 17.2 MG/DL (ref 8.4–25.7)
CALCIUM SERPL-MCNC: 8.3 MG/DL (ref 8.8–10)
CHLORIDE SERPL-SCNC: 100 MMOL/L (ref 98–107)
CO2 SERPL-SCNC: 27 MMOL/L (ref 23–31)
CREAT SERPL-MCNC: 1.24 MG/DL (ref 0.72–1.25)
CREAT/UREA NIT SERPL: 14
EOSINOPHIL # BLD AUTO: 0.01 X10(3)/MCL (ref 0–0.9)
EOSINOPHIL NFR BLD AUTO: 0.1 %
ERYTHROCYTE [DISTWIDTH] IN BLOOD BY AUTOMATED COUNT: 15.8 % (ref 11.5–17)
GFR SERPLBLD CREATININE-BSD FMLA CKD-EPI: >60 ML/MIN/1.73/M2
GLOBULIN SER-MCNC: 5 GM/DL (ref 2.4–3.5)
GLUCOSE SERPL-MCNC: 108 MG/DL (ref 70–110)
GLUCOSE SERPL-MCNC: 75 MG/DL (ref 82–115)
HCT VFR BLD AUTO: 26.6 % (ref 42–52)
HGB BLD-MCNC: 8.7 G/DL (ref 14–18)
IMM GRANULOCYTES # BLD AUTO: 0.1 X10(3)/MCL (ref 0–0.04)
IMM GRANULOCYTES NFR BLD AUTO: 0.9 %
LYMPHOCYTES # BLD AUTO: 1.52 X10(3)/MCL (ref 0.6–4.6)
LYMPHOCYTES NFR BLD AUTO: 13.3 %
MCH RBC QN AUTO: 28.2 PG (ref 27–31)
MCHC RBC AUTO-ENTMCNC: 32.7 G/DL (ref 33–36)
MCV RBC AUTO: 86.1 FL (ref 80–94)
MONOCYTES # BLD AUTO: 1.23 X10(3)/MCL (ref 0.1–1.3)
MONOCYTES NFR BLD AUTO: 10.8 %
NEUTROPHILS # BLD AUTO: 8.56 X10(3)/MCL (ref 2.1–9.2)
NEUTROPHILS NFR BLD AUTO: 74.7 %
NRBC BLD AUTO-RTO: 0 %
PLATELET # BLD AUTO: 614 X10(3)/MCL (ref 130–400)
PMV BLD AUTO: 9.1 FL (ref 7.4–10.4)
POCT GLUCOSE: 108 MG/DL (ref 70–110)
POTASSIUM SERPL-SCNC: 3.7 MMOL/L (ref 3.5–5.1)
PROT SERPL-MCNC: 6.8 GM/DL (ref 5.8–7.6)
RBC # BLD AUTO: 3.09 X10(6)/MCL (ref 4.7–6.1)
SODIUM SERPL-SCNC: 136 MMOL/L (ref 136–145)
WBC # BLD AUTO: 11.44 X10(3)/MCL (ref 4.5–11.5)

## 2024-10-31 PROCEDURE — 25000003 PHARM REV CODE 250: Performed by: STUDENT IN AN ORGANIZED HEALTH CARE EDUCATION/TRAINING PROGRAM

## 2024-10-31 PROCEDURE — 63600175 PHARM REV CODE 636 W HCPCS: Performed by: INTERNAL MEDICINE

## 2024-10-31 PROCEDURE — 63600175 PHARM REV CODE 636 W HCPCS: Performed by: STUDENT IN AN ORGANIZED HEALTH CARE EDUCATION/TRAINING PROGRAM

## 2024-10-31 PROCEDURE — 94799 UNLISTED PULMONARY SVC/PX: CPT

## 2024-10-31 PROCEDURE — 25000003 PHARM REV CODE 250: Performed by: INTERNAL MEDICINE

## 2024-10-31 PROCEDURE — 36415 COLL VENOUS BLD VENIPUNCTURE: CPT

## 2024-10-31 PROCEDURE — 94760 N-INVAS EAR/PLS OXIMETRY 1: CPT

## 2024-10-31 PROCEDURE — S0179 MEGESTROL 20 MG: HCPCS | Performed by: STUDENT IN AN ORGANIZED HEALTH CARE EDUCATION/TRAINING PROGRAM

## 2024-10-31 PROCEDURE — 99900031 HC PATIENT EDUCATION (STAT)

## 2024-10-31 PROCEDURE — 21400001 HC TELEMETRY ROOM

## 2024-10-31 PROCEDURE — 85025 COMPLETE CBC W/AUTO DIFF WBC: CPT

## 2024-10-31 PROCEDURE — 80053 COMPREHEN METABOLIC PANEL: CPT

## 2024-10-31 PROCEDURE — A4216 STERILE WATER/SALINE, 10 ML: HCPCS | Performed by: STUDENT IN AN ORGANIZED HEALTH CARE EDUCATION/TRAINING PROGRAM

## 2024-10-31 PROCEDURE — 94664 DEMO&/EVAL PT USE INHALER: CPT

## 2024-10-31 PROCEDURE — 99900035 HC TECH TIME PER 15 MIN (STAT)

## 2024-10-31 PROCEDURE — 27000207 HC ISOLATION

## 2024-10-31 PROCEDURE — 63600175 PHARM REV CODE 636 W HCPCS

## 2024-10-31 PROCEDURE — 25000003 PHARM REV CODE 250: Performed by: NURSE PRACTITIONER

## 2024-10-31 PROCEDURE — 27000221 HC OXYGEN, UP TO 24 HOURS

## 2024-10-31 RX ORDER — FUROSEMIDE 10 MG/ML
20 INJECTION INTRAMUSCULAR; INTRAVENOUS ONCE
Status: COMPLETED | OUTPATIENT
Start: 2024-10-31 | End: 2024-10-31

## 2024-10-31 RX ADMIN — CARVEDILOL 25 MG: 12.5 TABLET, FILM COATED ORAL at 08:10

## 2024-10-31 RX ADMIN — MEGESTROL ACETATE 200 MG: 400 SUSPENSION ORAL at 08:10

## 2024-10-31 RX ADMIN — FUROSEMIDE 20 MG: 10 INJECTION, SOLUTION INTRAMUSCULAR; INTRAVENOUS at 06:10

## 2024-10-31 RX ADMIN — GABAPENTIN 400 MG: 300 CAPSULE ORAL at 06:10

## 2024-10-31 RX ADMIN — OXYCODONE AND ACETAMINOPHEN 1 TABLET: 10; 325 TABLET ORAL at 08:10

## 2024-10-31 RX ADMIN — OXYBUTYNIN CHLORIDE 5 MG: 5 TABLET ORAL at 09:10

## 2024-10-31 RX ADMIN — AMPICILLIN SODIUM AND SULBACTAM SODIUM 3 G: 2; 1 INJECTION, POWDER, FOR SOLUTION INTRAMUSCULAR; INTRAVENOUS at 11:10

## 2024-10-31 RX ADMIN — AMPICILLIN SODIUM AND SULBACTAM SODIUM 3 G: 2; 1 INJECTION, POWDER, FOR SOLUTION INTRAMUSCULAR; INTRAVENOUS at 01:10

## 2024-10-31 RX ADMIN — GABAPENTIN 400 MG: 300 CAPSULE ORAL at 09:10

## 2024-10-31 RX ADMIN — SENNOSIDES AND DOCUSATE SODIUM 1 TABLET: 50; 8.6 TABLET ORAL at 08:10

## 2024-10-31 RX ADMIN — TOBRAMYCIN SULFATE 510 MG: 40 INJECTION, SOLUTION INTRAMUSCULAR; INTRAVENOUS at 12:10

## 2024-10-31 RX ADMIN — OXYBUTYNIN CHLORIDE 5 MG: 5 TABLET ORAL at 08:10

## 2024-10-31 RX ADMIN — FENOFIBRATE 48 MG: 48 TABLET, FILM COATED ORAL at 08:10

## 2024-10-31 RX ADMIN — ENOXAPARIN SODIUM 80 MG: 80 INJECTION SUBCUTANEOUS at 12:10

## 2024-10-31 RX ADMIN — DOXYCYCLINE HYCLATE 100 MG: 100 TABLET, COATED ORAL at 09:10

## 2024-10-31 RX ADMIN — LACTULOSE 15 G: 10 SOLUTION ORAL at 08:10

## 2024-10-31 RX ADMIN — SENNOSIDES AND DOCUSATE SODIUM 1 TABLET: 50; 8.6 TABLET ORAL at 09:10

## 2024-10-31 RX ADMIN — ENOXAPARIN SODIUM 80 MG: 80 INJECTION SUBCUTANEOUS at 11:10

## 2024-10-31 RX ADMIN — PANTOPRAZOLE SODIUM 40 MG: 40 INJECTION, POWDER, LYOPHILIZED, FOR SOLUTION INTRAVENOUS at 08:10

## 2024-10-31 RX ADMIN — ATORVASTATIN CALCIUM 20 MG: 10 TABLET, FILM COATED ORAL at 09:10

## 2024-10-31 RX ADMIN — CIPROFLOXACIN 400 MG: 2 INJECTION, SOLUTION INTRAVENOUS at 09:10

## 2024-10-31 RX ADMIN — NIFEDIPINE 60 MG: 60 TABLET, FILM COATED, EXTENDED RELEASE ORAL at 08:10

## 2024-10-31 RX ADMIN — SODIUM CHLORIDE, PRESERVATIVE FREE 10 ML: 5 INJECTION INTRAVENOUS at 06:10

## 2024-10-31 RX ADMIN — LACTULOSE 15 G: 10 SOLUTION ORAL at 09:10

## 2024-10-31 RX ADMIN — OXYCODONE AND ACETAMINOPHEN 1 TABLET: 10; 325 TABLET ORAL at 06:10

## 2024-10-31 RX ADMIN — FLUOXETINE HYDROCHLORIDE 20 MG: 20 CAPSULE ORAL at 08:10

## 2024-10-31 RX ADMIN — OXYCODONE AND ACETAMINOPHEN 1 TABLET: 10; 325 TABLET ORAL at 04:10

## 2024-10-31 RX ADMIN — OXYBUTYNIN CHLORIDE 5 MG: 5 TABLET ORAL at 03:10

## 2024-10-31 RX ADMIN — DOXYCYCLINE HYCLATE 100 MG: 100 TABLET, COATED ORAL at 08:10

## 2024-10-31 RX ADMIN — LACTULOSE 15 G: 10 SOLUTION ORAL at 03:10

## 2024-10-31 RX ADMIN — AMPICILLIN SODIUM AND SULBACTAM SODIUM 3 G: 2; 1 INJECTION, POWDER, FOR SOLUTION INTRAMUSCULAR; INTRAVENOUS at 06:10

## 2024-10-31 RX ADMIN — GABAPENTIN 400 MG: 300 CAPSULE ORAL at 02:10

## 2024-10-31 RX ADMIN — TRAZODONE HYDROCHLORIDE 200 MG: 100 TABLET ORAL at 09:10

## 2024-10-31 RX ADMIN — CARVEDILOL 25 MG: 12.5 TABLET, FILM COATED ORAL at 09:10

## 2024-10-31 RX ADMIN — SODIUM CHLORIDE, PRESERVATIVE FREE 10 ML: 5 INJECTION INTRAVENOUS at 12:10

## 2024-10-31 RX ADMIN — MORPHINE SULFATE 2 MG: 4 INJECTION, SOLUTION INTRAMUSCULAR; INTRAVENOUS at 09:10

## 2024-10-31 RX ADMIN — CIPROFLOXACIN 400 MG: 2 INJECTION, SOLUTION INTRAVENOUS at 08:10

## 2024-10-31 RX ADMIN — METHOCARBAMOL 750 MG: 750 TABLET ORAL at 04:10

## 2024-10-31 RX ADMIN — OXYCODONE AND ACETAMINOPHEN 1 TABLET: 10; 325 TABLET ORAL at 12:10

## 2024-10-31 RX ADMIN — METHOCARBAMOL 750 MG: 750 TABLET ORAL at 09:10

## 2024-10-31 RX ADMIN — MORPHINE SULFATE 2 MG: 4 INJECTION, SOLUTION INTRAMUSCULAR; INTRAVENOUS at 10:10
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
## 2024-11-01 LAB — POCT GLUCOSE: 97 MG/DL (ref 70–110)

## 2024-11-01 PROCEDURE — 97606 NEG PRS WND THER DME>50 SQCM: CPT

## 2024-11-01 PROCEDURE — 94664 DEMO&/EVAL PT USE INHALER: CPT

## 2024-11-01 PROCEDURE — 99900035 HC TECH TIME PER 15 MIN (STAT)

## 2024-11-01 PROCEDURE — 21400001 HC TELEMETRY ROOM

## 2024-11-01 PROCEDURE — 25000003 PHARM REV CODE 250: Performed by: NURSE PRACTITIONER

## 2024-11-01 PROCEDURE — A4216 STERILE WATER/SALINE, 10 ML: HCPCS | Performed by: STUDENT IN AN ORGANIZED HEALTH CARE EDUCATION/TRAINING PROGRAM

## 2024-11-01 PROCEDURE — 63600175 PHARM REV CODE 636 W HCPCS: Performed by: STUDENT IN AN ORGANIZED HEALTH CARE EDUCATION/TRAINING PROGRAM

## 2024-11-01 PROCEDURE — 63600175 PHARM REV CODE 636 W HCPCS: Performed by: INTERNAL MEDICINE

## 2024-11-01 PROCEDURE — 25000003 PHARM REV CODE 250: Performed by: STUDENT IN AN ORGANIZED HEALTH CARE EDUCATION/TRAINING PROGRAM

## 2024-11-01 PROCEDURE — 25000003 PHARM REV CODE 250: Performed by: INTERNAL MEDICINE

## 2024-11-01 PROCEDURE — S0179 MEGESTROL 20 MG: HCPCS | Performed by: STUDENT IN AN ORGANIZED HEALTH CARE EDUCATION/TRAINING PROGRAM

## 2024-11-01 PROCEDURE — 27000207 HC ISOLATION

## 2024-11-01 RX ADMIN — SODIUM CHLORIDE, PRESERVATIVE FREE 10 ML: 5 INJECTION INTRAVENOUS at 05:11

## 2024-11-01 RX ADMIN — AMPICILLIN SODIUM AND SULBACTAM SODIUM 3 G: 2; 1 INJECTION, POWDER, FOR SOLUTION INTRAMUSCULAR; INTRAVENOUS at 05:11

## 2024-11-01 RX ADMIN — TRAZODONE HYDROCHLORIDE 200 MG: 100 TABLET ORAL at 09:11

## 2024-11-01 RX ADMIN — GABAPENTIN 400 MG: 300 CAPSULE ORAL at 09:11

## 2024-11-01 RX ADMIN — MORPHINE SULFATE 2 MG: 4 INJECTION, SOLUTION INTRAMUSCULAR; INTRAVENOUS at 09:11

## 2024-11-01 RX ADMIN — CIPROFLOXACIN 400 MG: 2 INJECTION, SOLUTION INTRAVENOUS at 09:11

## 2024-11-01 RX ADMIN — FENOFIBRATE 48 MG: 48 TABLET, FILM COATED ORAL at 08:11

## 2024-11-01 RX ADMIN — OXYCODONE AND ACETAMINOPHEN 1 TABLET: 10; 325 TABLET ORAL at 08:11

## 2024-11-01 RX ADMIN — OXYCODONE AND ACETAMINOPHEN 1 TABLET: 10; 325 TABLET ORAL at 04:11

## 2024-11-01 RX ADMIN — OXYBUTYNIN CHLORIDE 5 MG: 5 TABLET ORAL at 08:11

## 2024-11-01 RX ADMIN — LACTULOSE 15 G: 10 SOLUTION ORAL at 08:11

## 2024-11-01 RX ADMIN — SENNOSIDES AND DOCUSATE SODIUM 1 TABLET: 50; 8.6 TABLET ORAL at 08:11

## 2024-11-01 RX ADMIN — ENOXAPARIN SODIUM 80 MG: 80 INJECTION SUBCUTANEOUS at 01:11

## 2024-11-01 RX ADMIN — GABAPENTIN 400 MG: 300 CAPSULE ORAL at 05:11

## 2024-11-01 RX ADMIN — AMPICILLIN SODIUM AND SULBACTAM SODIUM 3 G: 2; 1 INJECTION, POWDER, FOR SOLUTION INTRAMUSCULAR; INTRAVENOUS at 01:11

## 2024-11-01 RX ADMIN — CARVEDILOL 25 MG: 12.5 TABLET, FILM COATED ORAL at 09:11

## 2024-11-01 RX ADMIN — OXYCODONE AND ACETAMINOPHEN 1 TABLET: 10; 325 TABLET ORAL at 01:11

## 2024-11-01 RX ADMIN — ATORVASTATIN CALCIUM 20 MG: 10 TABLET, FILM COATED ORAL at 09:11

## 2024-11-01 RX ADMIN — TOBRAMYCIN SULFATE 510 MG: 40 INJECTION, SOLUTION INTRAMUSCULAR; INTRAVENOUS at 10:11

## 2024-11-01 RX ADMIN — CARVEDILOL 25 MG: 12.5 TABLET, FILM COATED ORAL at 08:11

## 2024-11-01 RX ADMIN — NIFEDIPINE 60 MG: 60 TABLET, FILM COATED, EXTENDED RELEASE ORAL at 08:11

## 2024-11-01 RX ADMIN — FLUOXETINE HYDROCHLORIDE 20 MG: 20 CAPSULE ORAL at 08:11

## 2024-11-01 RX ADMIN — OXYBUTYNIN CHLORIDE 5 MG: 5 TABLET ORAL at 09:11

## 2024-11-01 RX ADMIN — MORPHINE SULFATE 2 MG: 4 INJECTION, SOLUTION INTRAMUSCULAR; INTRAVENOUS at 05:11

## 2024-11-01 RX ADMIN — MEGESTROL ACETATE 200 MG: 400 SUSPENSION ORAL at 08:11

## 2024-11-01 RX ADMIN — SODIUM CHLORIDE, PRESERVATIVE FREE 10 ML: 5 INJECTION INTRAVENOUS at 01:11

## 2024-11-01 RX ADMIN — OXYCODONE AND ACETAMINOPHEN 1 TABLET: 10; 325 TABLET ORAL at 05:11

## 2024-11-01 RX ADMIN — PANTOPRAZOLE SODIUM 40 MG: 40 INJECTION, POWDER, LYOPHILIZED, FOR SOLUTION INTRAVENOUS at 08:11

## 2024-11-01 RX ADMIN — OXYBUTYNIN CHLORIDE 5 MG: 5 TABLET ORAL at 02:11

## 2024-11-01 RX ADMIN — SODIUM CHLORIDE, PRESERVATIVE FREE 10 ML: 5 INJECTION INTRAVENOUS at 12:11

## 2024-11-01 RX ADMIN — DOXYCYCLINE HYCLATE 100 MG: 100 TABLET, COATED ORAL at 09:11

## 2024-11-01 RX ADMIN — GABAPENTIN 400 MG: 300 CAPSULE ORAL at 02:11

## 2024-11-01 RX ADMIN — DOXYCYCLINE HYCLATE 100 MG: 100 TABLET, COATED ORAL at 08:11
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
## 2024-11-01 NOTE — PROGRESS NOTES
[facility] - 8th Floor Med Surg  Wound Care    Patient Name:  Bianca Khan   MRN:  [id number]  Date: 2024  Diagnosis: Sacral wound    History:     Past Medical History:   Diagnosis Date    Arthritis     Chronic ulcer of ankle 2022    ESBL (extended spectrum beta-lactamase) producing bacteria infection 2024    Frequent UTI 2019    Generalized anxiety disorder 2022    Neurogenic bladder 2022    Osteomyelitis 2022    Paraplegia     Presence of suprapubic catheter 2022    Pure hypercholesterolemia 2022    Retention of urine, unspecified 2019    Spinal cord injury at T1-T6 level 2018       Social History     Socioeconomic History    Marital status:    Tobacco Use    Smoking status: Some Days     Current packs/day: 0.00     Average packs/day: 0.2 packs/day for 41.5 years (10.4 ttl pk-yrs)     Types: Cigars, Cigarettes     Start date: 1982     Last attempt to quit: 2023     Years since quittin.2    Smokeless tobacco: Never   Substance and Sexual Activity    Alcohol use: Not Currently     Alcohol/week: 2.0 standard drinks of alcohol     Types: 2 Cans of beer per week    Drug use: Not Currently     Types: Oxycodone    Sexual activity: Not Currently     Partners: Female     Birth control/protection: None     Social Drivers of Health     Financial Resource Strain: Low Risk  (10/10/2024)    Overall Financial Resource Strain (CARDIA)     Difficulty of Paying Living Expenses: Not hard at all   Food Insecurity: No Food Insecurity (10/10/2024)    Hunger Vital Sign     Worried About Running Out of Food in the Last Year: Never true     Ran Out of Food in the Last Year: Never true   Transportation Needs: No Transportation Needs (10/10/2024)    TRANSPORTATION NEEDS     Transportation : No   Physical Activity: Inactive (2023)    Exercise Vital Sign     Days of Exercise per Week: 0 days     Minutes of Exercise per Session: 0 min    Stress: No Stress Concern Present (10/10/2024)    [employer] - Occupational Stress Questionnaire     Feeling of Stress : Only a little   Housing Stability: Low Risk  (10/10/2024)    Housing Stability Vital Sign     Unable to Pay for Housing in the Last Year: No     Homeless in the Last Year: No       Precautions:     Allergies as of 10/08/2024 - Reviewed 10/08/2024   Allergen Reaction Noted    Baclofen Itching and Anxiety 06/17/2019       WOC Assessment Details/Treatment        11/01/24 1043   WOCN Assessment   Visit Date 11/01/24   Visit Time 1043   Consult Type Follow Up   WOCN Speciality Wound   WOCN List wound vac   Wound pressure;surgical   Procedure wound vac   Intervention chart review;assessed;changed   Teaching on-going        Altered Skin Integrity 01/09/24 0848 upper Sacral spine   Date First Assessed/Time First Assessed: 01/09/24 0848   Altered Skin Integrity Present on Admission - Did Patient arrive to the hospital with altered skin?: suspected hospital acquired  Orientation: upper  Location: Sacral spine   Wound Image    Description of Altered Skin Integrity Full thickness tissue loss. Subcutaneous fat may be visible but bone, tendon or muscle are not exposed   Dressing Appearance Dry;Intact;Clean   Drainage Amount Small   Drainage Characteristics/Odor Serosanguineous   Appearance Pink;Red;Yellow   Tissue loss description Full thickness   Black (%), Wound Tissue Color 0 %   Red (%), Wound Tissue Color 80 %   Yellow (%), Wound Tissue Color 20 %   Periwound Area Intact;Dry;Pale white   Wound Edges Defined   Wound Length (cm) 5.5 cm   Wound Width (cm) 5.8 cm   Wound Depth (cm) 1 cm   Wound Volume (cm^3) 31.9 cm^3   Wound Surface Area (cm^2) 31.9 cm^2   Care Cleansed with:;Antimicrobial agent  (vashe)   Dressing Applied;Gauze, wet to dry;Absorptive Pad        Wound 05/30/24 0000 Pressure Injury Right Buttocks   Date First Assessed/Time First Assessed: 05/30/24 0000   Primary  Wound Type: Pressure Injury  Side: Right  Location: Buttocks  Is this injury device related?: No   Wound Image    Pressure Injury Stage U   Dressing Appearance Dry;Intact   Drainage Amount Small   Drainage Characteristics/Odor Serosanguineous   Appearance Pink;Red;Yellow   Tissue loss description Full thickness   Black (%), Wound Tissue Color 0 %   Red (%), Wound Tissue Color 70 %   Yellow (%), Wound Tissue Color 30 %   Periwound Area Intact;Macerated;Scar tissue;Pink   Wound Edges Defined   Wound Length (cm) 5 cm   Wound Width (cm) 5 cm   Wound Depth (cm) 2.5 cm   Wound Volume (cm^3) 62.5 cm^3   Wound Surface Area (cm^2) 25 cm^2   Care Cleansed with:;Antimicrobial agent  (vashe)   Dressing Removed;Applied  (NPWT set @125mmHg)   Dressing Change Due 11/05/24        Negative Pressure Wound Therapy  10/10/24 1017   Placement Date/Time: 10/10/24 1017   Location: Buttocks  Additional Comments: SN: [id number]   NPWT Type Vacuum Therapy   Therapy Setting NPWT Continuous therapy   Pressure Setting NPWT 125 mmHg   Therapy Interventions NPWT Dressing changed;Seal intact   Sponges Inserted NPWT 1;White;Black   Sponges Removed NPWT 1;White;Black       WOCN follow up for re-application of wound vac to right buttock and follow up for sacral wound. Discussed POC w/ pt.'s nurse Zaria. Family at bedside. Had assistance from Bar from wound care team w/ wound vac placement. Explained reason for visit. Wound vac application change to right buttock-1 white foam, 1 black foam removed. Cleaned wound w/ vashe, 1 white foam, 1 black foam tracked to another black foam, seal intact w/ no leakage or blockage detected w/ setting at 125mmHg. Pt. Tolerated application well with no signs of pain or discomfort. Cont. Tx recs in orders for sacral. Nursing to cont. Tx recs and preventative measures. Will follow up on next Tuesday for wound vac change.     11/01/2024

## 2024-11-01 NOTE — PROGRESS NOTES
Infectious Diseases Progress Note  60 y/o male with past medical history of T-spine cord injury with paraplegia, diabetes, HTN, hepatitis-C, chronic MRSA L ankle wound/osteomyelitis, was on suppressive doxycycline and R knee infection/septic arthritis and osteomyelitis with GBS/Enterococcus and Ecoli isolates who presented to ER on 10/8 with increased generalized pain, increased foul smelling drainage from sacral wound with fever and chills. He was noted to be hypotensive per EMS. On admit he was afebrile without leukocytosis. ESR 98 and .80. MRSA PCR (-). U/A with 21-50 WBC, 0-5 RBC, 75 LE, no bacteria, negative nitrites. Urine culture negative. Blood cultures negative. Sacral wound culture with many Acinetobacter, many ESBL Ecoli, moderate Enterococcus, Bacteroides and Peptoniphilus. CT pelvis with contrast showed worsening inflammatory change of the sacral decubitus ulcers with gas now identified inferior to the right pubic ramus, stool noted throughout the colon as may be seen with constipation, pyelonephritis is not excluded. Seen by Surgery and underwent debridement of sacral wound. During his stay he was noted to have large amounts of bleeding from sacral wound. CT abdomen/pelvis on 10/17 showed interval development of L renal rupture with large subcapsular hematoma, L retroperitoneal hemorrhage. He received multiple blood/blood products over the past couple days. He was noted to be hypotensive and was transferred to ICU on 10/17. He was seen by Vascular and underwent Aortogram,  left renal angiography with coil embolization of left renal artery and right groin central venous line insertion on 10/17. Sputum culture on 108/23 revealing Pseudomonas. RUE venous ultrasound positive for deep  vein thrombosis in brachial and radial veins of the right upper extremity. Sputum culture with MDR Pseudomonas  He is currently on Unasyn, Cipro and Tobramycin. Remains on chronic oral suppression with  Doxycycline    Subjective:  Lying in bed, no acute distress. Currently on 2L NC. No new complaints reported. Afebrile.    Review of Systems   Constitutional:  Positive for malaise/fatigue.   HENT: Negative.     Eyes: Negative.    Respiratory:  Positive for shortness of breath.    Cardiovascular: Negative.    Gastrointestinal: Negative.    Musculoskeletal: Negative.    Skin: Negative.    Neurological:  Positive for focal weakness and weakness.   All other systems reviewed and are negative.      Review of patient's allergies indicates:   Allergen Reactions    Baclofen Itching and Anxiety       Past Medical History:   Diagnosis Date    Arthritis     Chronic ulcer of ankle 05/26/2022    ESBL (extended spectrum beta-lactamase) producing bacteria infection 08/30/2024    Frequent UTI 07/02/2019    Generalized anxiety disorder 05/26/2022    Neurogenic bladder 05/26/2022    Osteomyelitis 05/26/2022    Paraplegia     Presence of suprapubic catheter 05/26/2022    Pure hypercholesterolemia 05/26/2022    Retention of urine, unspecified 08/09/2019    Spinal cord injury at T1-T6 level 04/20/2018       Past Surgical History:   Procedure Laterality Date    ANGIOGRAM, RENAL ARTERIES, BILATERAL N/A 10/17/2024    Procedure: Angiogram, Renal Arteries, Bilateral;  Surgeon: Pati King MD;  Location: [facility] CATH LAB;  Service: Peripheral Vascular;  Laterality: N/A;  left renal artery coiling    APPLICATION OF WOUND VACUUM-ASSISTED CLOSURE DEVICE N/A 10/10/2024    Procedure: APPLICATION, WOUND VAC;  Surgeon: Rob Sinclair MD;  Location: [facility] OR;  Service: General;  Laterality: N/A;    EGD, WITH CLOSED BIOPSY N/A 8/29/2024    Procedure: EGD, WITH CLOSED BIOPSY;  Surgeon: Mikal Segovia MD;  Location: [facility] ENDOSCOPY;  Service: Gastroenterology;  Laterality: N/A;    ESOPHAGOGASTRODUODENOSCOPY N/A 8/29/2024    Procedure: EGD;  Surgeon: Mikal Segovia MD;  Location: [facility] ENDOSCOPY;  Service: Gastroenterology;  Laterality:  N/A;    FRACTURE SURGERY      INCISION AND DRAINAGE, LOWER EXTREMITY Right 2023    Procedure: INCISION AND DRAINAGE, LOWER EXTREMITY;  Surgeon: Prabhu Shen DO;  Location: [facility] OR;  Service: Orthopedics;  Laterality: Right;  supine bone foam wash stuff cultures    INCISION AND DRAINAGE, LOWER EXTREMITY Right 2023    Procedure: INCISION AND DRAINAGE, LOWER EXTREMITY;  Surgeon: Prabhu Shen DO;  Location: [facility] OR;  Service: Orthopedics;  Laterality: Right;  supine any table bone foam wash stuff possible wound vac    INCISION AND DRAINAGE, LOWER EXTREMITY Right 2023    Procedure: INCISION AND DRAINAGE, LOWER EXTREMITY;  Surgeon: Prabhu Shen DO;  Location: [facility] OR;  Service: Orthopedics;  Laterality: Right;  supine bone foam vascular bed removal of distal femoral plate, wash stuff, possible AKA    INSERTION OF INTRAMEDULLARY MARISA Right 08/10/2022    Procedure: INSERTION, INTRAMEDULLARY MARISA RIGHT TIBIA;  Surgeon: Jorge Orellana MD;  Location: [facility] OR;  Service: Orthopedics;  Laterality: Right;    JOINT REPLACEMENT      Ankel    PRESSURE ULCER DEBRIDEMENT N/A 10/10/2024    Procedure: DEBRIDEMENT, PRESSURE ULCER;  Surgeon: Rob Sinclair MD;  Location: [facility] OR;  Service: General;  Laterality: N/A;  sacral wound, R buttock wound    REMOVAL OF HARDWARE FROM LOWER EXTREMITY Right 2023    Procedure: REMOVAL, HARDWARE, LOWER EXTREMITY;  Surgeon: Prabhu Shen DO;  Location: [facility] OR;  Service: Orthopedics;  Laterality: Right;    SPINE SURGERY  2018       Social History     Socioeconomic History    Marital status:    Tobacco Use    Smoking status: Some Days     Current packs/day: 0.00     Average packs/day: 0.2 packs/day for 41.5 years (10.4 ttl pk-yrs)     Types: Cigars, Cigarettes     Start date: 1982     Last attempt to quit: 2023     Years since quittin.2    Smokeless tobacco: Never   Substance and Sexual Activity    Alcohol use: Not Currently      Alcohol/week: 2.0 standard drinks of alcohol     Types: 2 Cans of beer per week    Drug use: Not Currently     Types: Oxycodone    Sexual activity: Not Currently     Partners: Female     Birth control/protection: None     Social Drivers of Health     Financial Resource Strain: Low Risk  (10/10/2024)    Overall Financial Resource Strain (CARDIA)     Difficulty of Paying Living Expenses: Not hard at all   Food Insecurity: No Food Insecurity (10/10/2024)    Hunger Vital Sign     Worried About Running Out of Food in the Last Year: Never true     Ran Out of Food in the Last Year: Never true   Transportation Needs: No Transportation Needs (10/10/2024)    TRANSPORTATION NEEDS     Transportation : No   Physical Activity: Inactive (12/11/2023)    Exercise Vital Sign     Days of Exercise per Week: 0 days     Minutes of Exercise per Session: 0 min   Stress: No Stress Concern Present (10/10/2024)    [employer] - Occupational Stress Questionnaire     Feeling of Stress : Only a little   Housing Stability: Low Risk  (10/10/2024)    Housing Stability Vital Sign     Unable to Pay for Housing in the Last Year: No     Homeless in the Last Year: No       Scheduled Meds:   ampicillin-sulbactam  3 g Intravenous Q6H    atorvastatin  20 mg Per NG tube Nightly    carvediloL  25 mg Oral BID    ciprofloxacin  400 mg Intravenous Q12H    doxycycline  100 mg Oral Q12H    enoxparin  1 mg/kg Subcutaneous Q12H (treatment, non-standard time)    fenofibrate  48 mg Per NG tube Daily    FLUoxetine  20 mg Per G Tube Daily    gabapentin  400 mg Per NG tube Q8H    lactulose 10 gram/15 ml  15 g Oral TID    megestroL  200 mg Oral Daily    NIFEdipine  60 mg Oral Daily    oxybutynin  5 mg Per NG tube TID    pantoprazole  40 mg Intravenous Daily    senna-docusate 8.6-50 mg  1 tablet Oral BID    sodium chloride 0.9%  10 mL Intravenous Q6H    tobramycin IV (PEDS and ADULTS)  7 mg/kg (Ideal) Intravenous Q36H     Continuous  "Infusions:      PRN Meds:  Current Facility-Administered Medications:     0.9%  NaCl infusion (for blood administration), , Intravenous, Q24H PRN    acetaminophen, 650 mg, Oral, Q4H PRN    albuterol-ipratropium, 3 mL, Nebulization, Q4H PRN    aluminum-magnesium hydroxide-simethicone, 30 mL, Oral, QID PRN    dextrose 10%, 12.5 g, Intravenous, PRN    dextrose 10%, 25 g, Intravenous, PRN    diphenhydrAMINE, 50 mg, Intravenous, Q6H PRN    etomidate, , Intravenous, Code/trauma/sedation Med    glucagon (human recombinant), 1 mg, Intramuscular, PRN    glucose, 16 g, Oral, PRN    glucose, 24 g, Oral, PRN    guaiFENesin 100 mg/5 ml, 200 mg, Oral, Q4H PRN    hydrALAZINE, 20 mg, Intravenous, Q4H PRN    hyoscyamine, 0.125 mg, Sublingual, Q4H PRN    insulin aspart U-100, 1-10 Units, Subcutaneous, QID (AC + HS) PRN    methocarbamoL, 750 mg, Oral, TID PRN    morphine, 2 mg, Intravenous, BID PRN    ondansetron, 4 mg, Intravenous, Q4H PRN    oxyCODONE-acetaminophen, 1 tablet, Oral, Q4H PRN    polyethylene glycol, 17 g, Oral, BID PRN    prochlorperazine, 5 mg, Intravenous, Q6H PRN    rocuronium, , Intravenous, Code/trauma/sedation Med    sodium chloride 0.9%, 10 mL, Intravenous, PRN    Flushing PICC/Midline Protocol, , , Until Discontinued **AND** sodium chloride 0.9%, 10 mL, Intravenous, Q6H **AND** sodium chloride 0.9%, 10 mL, Intravenous, PRN    tobramycin - pharmacy to dose, , Intravenous, pharmacy to manage frequency    traZODone, 200 mg, Oral, Nightly PRN    Objective:  /75   Pulse 73   Temp 97.4 °F (36.3 °C) (Oral)   Resp 18   Ht 5' 10" (1.778 m)   Wt 79.3 kg (174 lb 13.2 oz)   SpO2 (!) 92%   BMI 25.08 kg/m²     Physical Exam:   Physical Exam  Vitals reviewed.   HENT:      Head: Normocephalic.   Cardiovascular:      Rate and Rhythm: Normal rate and regular rhythm.   Pulmonary:      Effort: Pulmonary effort is normal. No respiratory distress.      Breath sounds: Normal breath sounds. No wheezing.      Comments: " Currently on 2L NC  Abdominal:      General: Bowel sounds are normal. There is no distension.      Palpations: Abdomen is soft.      Tenderness: There is no abdominal tenderness.   Genitourinary:     Comments: Suprapubic catheter  Musculoskeletal:      Cervical back: Normal range of motion.      Comments: Paraplegia   Skin:     Findings: No rash.      Comments: Wounds dressed. R chest tunneled central line   Neurological:      Mental Status: He is alert and oriented to person, place, and time.   Psychiatric:         Behavior: Behavior normal.     Imaging    Lab Review   Recent Results (from the past 24 hours)   POCT glucose    Collection Time: 10/31/24  5:33 PM   Result Value Ref Range    POCT Glucose 108 70 - 110 mg/dL   POCT Glucose, Hand-Held Device    Collection Time: 10/31/24  6:28 PM   Result Value Ref Range    POC Glucose 108 70 - 110 MG/DL   POCT glucose    Collection Time: 11/01/24 11:00 AM   Result Value Ref Range    POCT Glucose 97 70 - 110 mg/dL       Assessment/Plan:  Sepsis - resolved  Sacral wound infection - CR Acinetobacter / ESBL Ecoli / Enterococcus / Bacteroides / Peptoniphilus isolates  Pneumonitis / Pleural effusions  MDR Pseudomonas (+) sputum  ANTON  Pyelonephritis per CT   Constipation  Paraplegia  Neurogenic bladder with suprapubic catheter  DM Type II  MRSA L ankle osteomyelitis with retained hardware  Anemia    -Continue Unasyn #19, Cipro #19 and Tobramycin #5/7  -Has been unable to get MRI R hip due to clinical status. Will extend antibiotics course another 2 weeks with end date 11/11  -Remains afebrile without leukocytosis, follow  -Currently on 2L NC, wean as tolerated  -Repeat CXR on 10/26 with pulmonary edema, bilateral effusions, lower lobe atelectasis unchanged.   -CXR on 10/21 with B/L pleural effusions with associated atelectasis and/or infiltrate  -Sputum culture on 10/23 with moderate Pseudomonas species, follow  -Pt with significant anemia, CT abdomen/pelvis showed L renal  rupture with large subcapsular hematoma  -Seen by Vascular, inputs noted. S/P aortogram, left renal angiography with coil embolization of left renal artery on 10/17  -Received multiple units of blood/blood products during his stay  -CT pelvis with contrast showed worsening inflammatory change of sacral ulcer with gas inferior to the R pubic ramus  -Seen by Surgery, inputs noted  -S/P debridement of sacral wound on 10/10  -Sacral wound cultures revealing CR Acinetobacter, ESBL Ecoli, Enterococcus, Bacteroides, Peptoniphilus isolates  -Continue wound care  -Blood cultures negative   -Discussed with patient, family and nursing staff. Pt is now a DNR. CM working on transfer to TCU noted

## 2024-11-01 NOTE — CARE UPDATE
[id number] spoke with pt who does want to go to TCU. Messaged Felipa with TCU and pt has no medicare days for anything.  Felipa called back and said he did not have inpt days however he has SNF days. She is going to look further into it and let me know since pt is on 2 abxs.

## 2024-11-01 NOTE — CONSULTS
Inpatient Nutrition Assessment    Admit Date: 10/8/2024   Total duration of encounter: 24 days   Patient Age: 59 y.o.    Nutrition Recommendation/Prescription     -Continue Heart Healthy, Diabetic Diet as tolerated. Assist with meals and encourage intake.  -Continue Boost VHC (pt requests strawberry flavor only) once daily to provide 530 kcal and 22 gm protein per container.   -Continue Malachi BID for wound healing.  -Continue appetite stimulant as medically feasible.   -Monitor wt, labs, and intake.     Communication of Recommendations: reviewed with patient    Nutrition Assessment     Malnutrition Assessment/Nutrition-Focused Physical Exam    Malnutrition Context: acute illness or injury (10/17/24 1218)  Malnutrition Level: moderate (10/17/24 1218)  Energy Intake (Malnutrition):  (family denies) (10/17/24 1218)  Weight Loss (Malnutrition):  (does not meet criteria) (10/17/24 1218)  Subcutaneous Fat (Malnutrition): mild depletion (10/17/24 1218)     Upper Arm Region (Subcutaneous Fat Loss): mild depletion     Muscle Mass (Malnutrition): mild depletion (10/17/24 1218)     Clavicle Bone Region (Muscle Loss): mild depletion                    Fluid Accumulation (Malnutrition): mild (10/17/24 1218)        A minimum of two characteristics is recommended for diagnosis of either severe or non-severe malnutrition.    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 including ESBL E coli, Enterobacter, carbapenem resistant Pseudomonas, Enterococcus faecalis     Scheduled Medications:  ampicillin-sulbactam, 3 g, Q6H  atorvastatin, 20 mg, Nightly  carvediloL, 25 mg, BID  ciprofloxacin, 400 mg, Q12H  doxycycline, 100 mg, Q12H  enoxparin, 1 mg/kg, Q12H (treatment, non-standard time)  fenofibrate, 48 mg, Daily  FLUoxetine, 20 mg, Daily  gabapentin, 400 mg, Q8H  lactulose 10 gram/15 ml, 15 g, TID  megestroL, 200 mg, Daily  NIFEdipine, 60 mg, Daily  oxybutynin, 5 mg, TID  pantoprazole, 40 mg, Daily  senna-docusate 8.6-50 mg, 1 tablet, BID  sodium chloride 0.9%, 10 mL, Q6H  tobramycin IV (PEDS and ADULTS), 7 mg/kg (Ideal), Q36H    Continuous Infusions:     PRN Medications:   0.9%  NaCl infusion (for blood administration), , Q24H PRN  acetaminophen, 650 mg, Q4H PRN  albuterol-ipratropium, 3 mL, Q4H PRN  aluminum-magnesium hydroxide-simethicone, 30 mL, QID PRN  dextrose 10%, 12.5 g, PRN  dextrose 10%, 25 g, PRN  diphenhydrAMINE, 50 mg, Q6H PRN  etomidate, , Code/trauma/sedation Med  glucagon (human recombinant), 1 mg, PRN  glucose, 16 g, PRN  glucose, 24 g, PRN  guaiFENesin 100 mg/5 ml, 200 mg, Q4H PRN  hydrALAZINE, 20 mg, Q4H PRN  hyoscyamine, 0.125 mg, Q4H PRN  insulin aspart U-100, 1-10 Units, QID (AC + HS) PRN  methocarbamoL, 750 mg, TID PRN  morphine, 2 mg, BID PRN  ondansetron, 4 mg, Q4H PRN  oxyCODONE-acetaminophen, 1 tablet, Q4H PRN  polyethylene glycol, 17 g, BID PRN  prochlorperazine, 5 mg, Q6H PRN  rocuronium, , Code/trauma/sedation Med  sodium chloride 0.9%, 10 mL, PRN  sodium chloride 0.9%, 10 mL, PRN  tobramycin - pharmacy to dose, , pharmacy to manage frequency  traZODone, 200 mg, Nightly PRN    Calorie Containing IV Medications: no significant kcals from medications at this time    Recent Labs   Lab 10/25/24  2036 10/26/24  0445 10/27/24  0509 10/28/24  0520 10/29/24  1042 10/30/24  1208 10/30/24  1209 10/31/24  0709   NA  --  138 139 136 134* 133*  --  136   K   --  3.4* 4.1 4.1 4.4 3.6  --  3.7   CALCIUM  --  8.1* 8.1* 8.2* 8.0* 8.4*  --  8.3*   CL  --  102 104 103 103 98  --  100   CO2  --  29 26 27 22* 28  --  27   BUN  --  18.2 14.7 14.2 13.7 16.8  --  17.2   CREATININE  --  1.33* 1.23 1.11 1.21 1.26*  --  1.24   EGFRNORACEVR  --  >60 >60 >60 >60 >60  --  >60   GLUCOSE  --  127* 84 84 109 87  --  75*   BILITOT  --   --   --   --  1.0 1.1  --  1.0   ALKPHOS  --   --   --   --  87 83  --  83   ALT  --   --   --   --  18 14  --  13   AST  --   --   --   --  26 23  --  22   ALBUMIN  --   --   --   --  1.9* 1.8*  --  1.8*   WBC 11.36 11.11 12.33* 12.07* 13.49*  --  11.35 11.44   HGB 7.1* 7.0* 8.8* 8.6* 9.8*  --  8.6* 8.7*   HCT 21.9* 21.5* 27.7* 26.4* 31.1*  --  26.2* 26.6*     Nutrition Orders:  Diet Adult Regular Cardiac (Low Na/Chol), Diabetic; 2000 Calories (up to 75 gm per meal)  Dietary nutrition supplements TID; Boost Very High Calorie Nutritional Drink - Strawberry,Dietary nutrition supplements BID; Malachi - Fruit Punch    Appetite/Oral Intake: fair/25-50% of meals  Factors Affecting Nutritional Intake: decreased appetite  Social Needs Impacting Access to Food: none identified  Food/Confucianist/Cultural Preferences: none reported  Food Allergies: none reported  Last Bowel Movement: 10/31/24  Wound(s):     Altered Skin Integrity 06/28/23 2230 Right lateral Ankle #6-Tissue loss description: Full thickness       Wound 05/30/24 0000 Pressure Injury Right Buttocks-Tissue loss description: Full thickness       Altered Skin Integrity 01/09/24 0848 upper Sacral spine-Tissue loss description: Full thickness       Wound 08/22/24 0800 Other (comment) Left Heel-Tissue loss description: Full thickness    Comments    10/10: Pt was in surgery at time of visit. Having wound debridement with wound vac placement on Stage 4 wound of rt gluteus. Will add Malachi to assist with wound healing. Recommend vitamin regimen for wounds.     10/17/24 Patient transferred to ICU, on ventilator  "currently, no propofol. Spoke with patient's wife at bedside who reports patient was eating well with a good appetite prior to ICU transfer, she reports bringing him food from home, good intake of Malachi. Tube feeding recommendation provided.    10/18/24 Consult received for tube feeding, will order.    10/22/24: Per RN, pt did not want to really eat earlier; family present in the room and encouraging intake.     10/24/24: Per pt's family member, pt ate ~ half of breakfast. Pt denies n/v; would like a strawberry flavored oral supplement.     10/29/24: Pt still with decreased appetite; denies n/v; taking his oral supplements.     24: Pt with fair intake, eating a lot of outside foods; now on appetite stimulant; taking his Malachi; would only like Boost VHC once daily.     Anthropometrics    Height: 5' 10" (177.8 cm), Height Method: Stated  Last Weight: 79.3 kg (174 lb 13.2 oz) (10/17/24 1212), Weight Method: Bed Scale  BMI (Calculated): 25.1  BMI Classification: normal (BMI 18.5-24.9)        Ideal Body Weight (IBW), Male: 166 lb     % Ideal Body Weight, Male (lb): 96.95 %                 Usual Body Weight (UBW), k.57 kg-77.11 kg  % Usual Body Weight: 109.5  % Weight Change From Usual Weight: 9.27 %  Usual Weight Provided By: family/caregiver    Wt Readings from Last 5 Encounters:   10/17/24 79.3 kg (174 lb 13.2 oz)   24 63.5 kg (139 lb 15.9 oz)   24 71.2 kg (156 lb 15.5 oz)   24 78.5 kg (173 lb 1 oz)   01/10/24 78.5 kg (173 lb 1 oz)     Weight Change(s) Since Admission:   (10/17) took bed weight 79.3 kg during rounds (estimated 4 kg subtracted for equipment), increase noted  Wt Readings from Last 1 Encounters:   10/17/24 1212 79.3 kg (174 lb 13.2 oz)   10/09/24 0430 73 kg (160 lb 15 oz)   10/08/24 1719 63.5 kg (140 lb)   Admit Weight: 63.5 kg (140 lb) (10/08/24 0431), Weight Method: Stated    Estimated Needs    Weight Used For Calorie Calculations: 79.3 kg (174 lb 13.2 oz)  Energy Calorie " Requirements (kcal): 1983-2379 kcal (25-30 kcal/kg)  Energy Need Method: Kcal/kg  Weight Used For Protein Calculations: 79.3 kg (174 lb 13.2 oz)  Protein Requirements: 95 gm (1.2g/kg)  Fluid Requirements (mL): 1983 mL  CHO Requirement: 262-315 gm (45% EEN)  Last Updated: 10/22    Enteral Nutrition Patient not receiving enteral nutrition at this time.    Parenteral Nutrition Patient not receiving parenteral nutrition support at this time.    Evaluation of Received Nutrient Intake    Calories: meeting estimated needs  Protein: meeting estimated needs    Patient Education Not applicable.    Nutrition Diagnosis     PES: Inadequate energy intake related to inability to consume sufficient nutrients as evidenced by less than 80% needs met. (active)  PES: Moderate acute disease or injury related malnutrition related to acute illness as evidenced by mild fat depletion, mild muscle depletion, and edema. (active)    Nutrition Interventions     Intervention(s): modified composition of enteral nutrition, modified rate of enteral nutrition, and collaboration with other providers  Goal: Meet greater than 80% of nutritional needs by follow-up. (goal progressing)  Goal: Tolerate enteral feeding at goal rate by follow-up. (goal discontinued)    Nutrition Goals & Monitoring     Dietitian will monitor: food and beverage intake, energy intake, weight, and electrolyte/renal panel  Discharge planning: continue Heart Healthy, Diabetic diet with Boost VHC/Malachi oral supplements  Nutrition Risk/Follow-Up: high (follow-up in 1-4 days)   Please consult if re-assessment needed sooner.

## 2024-11-02 LAB
ANION GAP SERPL CALC-SCNC: 9 MEQ/L
BASOPHILS # BLD AUTO: 0.03 X10(3)/MCL
BASOPHILS NFR BLD AUTO: 0.4 %
BUN SERPL-MCNC: 15.6 MG/DL (ref 8.4–25.7)
CALCIUM SERPL-MCNC: 8.4 MG/DL (ref 8.8–10)
CHLORIDE SERPL-SCNC: 102 MMOL/L (ref 98–107)
CO2 SERPL-SCNC: 27 MMOL/L (ref 23–31)
CREAT SERPL-MCNC: 1.15 MG/DL (ref 0.72–1.25)
CREAT/UREA NIT SERPL: 14
EOSINOPHIL # BLD AUTO: 0.03 X10(3)/MCL (ref 0–0.9)
EOSINOPHIL NFR BLD AUTO: 0.4 %
ERYTHROCYTE [DISTWIDTH] IN BLOOD BY AUTOMATED COUNT: 15.8 % (ref 11.5–17)
GFR SERPLBLD CREATININE-BSD FMLA CKD-EPI: >60 ML/MIN/1.73/M2
GLUCOSE SERPL-MCNC: 79 MG/DL (ref 82–115)
HCT VFR BLD AUTO: 26.8 % (ref 42–52)
HGB BLD-MCNC: 8.5 G/DL (ref 14–18)
IMM GRANULOCYTES # BLD AUTO: 0.05 X10(3)/MCL (ref 0–0.04)
IMM GRANULOCYTES NFR BLD AUTO: 0.6 %
LYMPHOCYTES # BLD AUTO: 1.8 X10(3)/MCL (ref 0.6–4.6)
LYMPHOCYTES NFR BLD AUTO: 22.2 %
MCH RBC QN AUTO: 27.8 PG (ref 27–31)
MCHC RBC AUTO-ENTMCNC: 31.7 G/DL (ref 33–36)
MCV RBC AUTO: 87.6 FL (ref 80–94)
MONOCYTES # BLD AUTO: 0.99 X10(3)/MCL (ref 0.1–1.3)
MONOCYTES NFR BLD AUTO: 12.2 %
NEUTROPHILS # BLD AUTO: 5.21 X10(3)/MCL (ref 2.1–9.2)
NEUTROPHILS NFR BLD AUTO: 64.2 %
NRBC BLD AUTO-RTO: 0 %
PLATELET # BLD AUTO: 562 X10(3)/MCL (ref 130–400)
PMV BLD AUTO: 9 FL (ref 7.4–10.4)
POCT GLUCOSE: 104 MG/DL (ref 70–110)
POCT GLUCOSE: 87 MG/DL (ref 70–110)
POTASSIUM SERPL-SCNC: 3.7 MMOL/L (ref 3.5–5.1)
RBC # BLD AUTO: 3.06 X10(6)/MCL (ref 4.7–6.1)
SODIUM SERPL-SCNC: 138 MMOL/L (ref 136–145)
WBC # BLD AUTO: 8.11 X10(3)/MCL (ref 4.5–11.5)

## 2024-11-02 PROCEDURE — 27000221 HC OXYGEN, UP TO 24 HOURS

## 2024-11-02 PROCEDURE — 25000003 PHARM REV CODE 250: Performed by: STUDENT IN AN ORGANIZED HEALTH CARE EDUCATION/TRAINING PROGRAM

## 2024-11-02 PROCEDURE — 80048 BASIC METABOLIC PNL TOTAL CA: CPT

## 2024-11-02 PROCEDURE — 94664 DEMO&/EVAL PT USE INHALER: CPT

## 2024-11-02 PROCEDURE — 63600175 PHARM REV CODE 636 W HCPCS: Performed by: STUDENT IN AN ORGANIZED HEALTH CARE EDUCATION/TRAINING PROGRAM

## 2024-11-02 PROCEDURE — 25000003 PHARM REV CODE 250: Performed by: INTERNAL MEDICINE

## 2024-11-02 PROCEDURE — A4216 STERILE WATER/SALINE, 10 ML: HCPCS | Performed by: STUDENT IN AN ORGANIZED HEALTH CARE EDUCATION/TRAINING PROGRAM

## 2024-11-02 PROCEDURE — 94760 N-INVAS EAR/PLS OXIMETRY 1: CPT

## 2024-11-02 PROCEDURE — 36415 COLL VENOUS BLD VENIPUNCTURE: CPT

## 2024-11-02 PROCEDURE — 25000003 PHARM REV CODE 250: Performed by: NURSE PRACTITIONER

## 2024-11-02 PROCEDURE — S0179 MEGESTROL 20 MG: HCPCS | Performed by: STUDENT IN AN ORGANIZED HEALTH CARE EDUCATION/TRAINING PROGRAM

## 2024-11-02 PROCEDURE — 21400001 HC TELEMETRY ROOM

## 2024-11-02 PROCEDURE — 27000207 HC ISOLATION

## 2024-11-02 PROCEDURE — 99900031 HC PATIENT EDUCATION (STAT)

## 2024-11-02 PROCEDURE — 99900035 HC TECH TIME PER 15 MIN (STAT)

## 2024-11-02 PROCEDURE — 85025 COMPLETE CBC W/AUTO DIFF WBC: CPT

## 2024-11-02 PROCEDURE — 63600175 PHARM REV CODE 636 W HCPCS: Performed by: INTERNAL MEDICINE

## 2024-11-02 PROCEDURE — 94799 UNLISTED PULMONARY SVC/PX: CPT

## 2024-11-02 RX ADMIN — SODIUM CHLORIDE, PRESERVATIVE FREE 10 ML: 5 INJECTION INTRAVENOUS at 11:11

## 2024-11-02 RX ADMIN — OXYCODONE AND ACETAMINOPHEN 1 TABLET: 10; 325 TABLET ORAL at 12:11

## 2024-11-02 RX ADMIN — ATORVASTATIN CALCIUM 20 MG: 10 TABLET, FILM COATED ORAL at 09:11

## 2024-11-02 RX ADMIN — AMPICILLIN SODIUM AND SULBACTAM SODIUM 3 G: 2; 1 INJECTION, POWDER, FOR SOLUTION INTRAMUSCULAR; INTRAVENOUS at 05:11

## 2024-11-02 RX ADMIN — SODIUM CHLORIDE, PRESERVATIVE FREE 10 ML: 5 INJECTION INTRAVENOUS at 05:11

## 2024-11-02 RX ADMIN — CIPROFLOXACIN 400 MG: 2 INJECTION, SOLUTION INTRAVENOUS at 08:11

## 2024-11-02 RX ADMIN — DOXYCYCLINE HYCLATE 100 MG: 100 TABLET, COATED ORAL at 09:11

## 2024-11-02 RX ADMIN — MORPHINE SULFATE 2 MG: 4 INJECTION, SOLUTION INTRAMUSCULAR; INTRAVENOUS at 10:11

## 2024-11-02 RX ADMIN — FENOFIBRATE 48 MG: 48 TABLET, FILM COATED ORAL at 08:11

## 2024-11-02 RX ADMIN — FLUOXETINE HYDROCHLORIDE 20 MG: 20 CAPSULE ORAL at 08:11

## 2024-11-02 RX ADMIN — GABAPENTIN 400 MG: 300 CAPSULE ORAL at 09:11

## 2024-11-02 RX ADMIN — OXYBUTYNIN CHLORIDE 5 MG: 5 TABLET ORAL at 08:11

## 2024-11-02 RX ADMIN — MEGESTROL ACETATE 200 MG: 400 SUSPENSION ORAL at 09:11

## 2024-11-02 RX ADMIN — AMPICILLIN SODIUM AND SULBACTAM SODIUM 3 G: 2; 1 INJECTION, POWDER, FOR SOLUTION INTRAMUSCULAR; INTRAVENOUS at 11:11

## 2024-11-02 RX ADMIN — CARVEDILOL 25 MG: 12.5 TABLET, FILM COATED ORAL at 09:11

## 2024-11-02 RX ADMIN — CIPROFLOXACIN 400 MG: 2 INJECTION, SOLUTION INTRAVENOUS at 09:11

## 2024-11-02 RX ADMIN — OXYCODONE AND ACETAMINOPHEN 1 TABLET: 10; 325 TABLET ORAL at 05:11

## 2024-11-02 RX ADMIN — ENOXAPARIN SODIUM 80 MG: 80 INJECTION SUBCUTANEOUS at 12:11

## 2024-11-02 RX ADMIN — CARVEDILOL 25 MG: 12.5 TABLET, FILM COATED ORAL at 08:11

## 2024-11-02 RX ADMIN — AMPICILLIN SODIUM AND SULBACTAM SODIUM 3 G: 2; 1 INJECTION, POWDER, FOR SOLUTION INTRAMUSCULAR; INTRAVENOUS at 12:11

## 2024-11-02 RX ADMIN — PANTOPRAZOLE SODIUM 40 MG: 40 INJECTION, POWDER, LYOPHILIZED, FOR SOLUTION INTRAVENOUS at 08:11

## 2024-11-02 RX ADMIN — LACTULOSE 15 G: 10 SOLUTION ORAL at 09:11

## 2024-11-02 RX ADMIN — TRAZODONE HYDROCHLORIDE 200 MG: 100 TABLET ORAL at 09:11

## 2024-11-02 RX ADMIN — SODIUM CHLORIDE, PRESERVATIVE FREE 10 ML: 5 INJECTION INTRAVENOUS at 01:11

## 2024-11-02 RX ADMIN — SENNOSIDES AND DOCUSATE SODIUM 1 TABLET: 50; 8.6 TABLET ORAL at 08:11

## 2024-11-02 RX ADMIN — SODIUM CHLORIDE, PRESERVATIVE FREE 10 ML: 5 INJECTION INTRAVENOUS at 12:11

## 2024-11-02 RX ADMIN — OXYBUTYNIN CHLORIDE 5 MG: 5 TABLET ORAL at 03:11

## 2024-11-02 RX ADMIN — METHOCARBAMOL 750 MG: 750 TABLET ORAL at 12:11

## 2024-11-02 RX ADMIN — SENNOSIDES AND DOCUSATE SODIUM 1 TABLET: 50; 8.6 TABLET ORAL at 09:11

## 2024-11-02 RX ADMIN — GABAPENTIN 400 MG: 300 CAPSULE ORAL at 05:11

## 2024-11-02 RX ADMIN — OXYBUTYNIN CHLORIDE 5 MG: 5 TABLET ORAL at 09:11

## 2024-11-02 RX ADMIN — ENOXAPARIN SODIUM 80 MG: 80 INJECTION SUBCUTANEOUS at 11:11

## 2024-11-02 RX ADMIN — GABAPENTIN 400 MG: 300 CAPSULE ORAL at 02:11

## 2024-11-02 RX ADMIN — AMPICILLIN SODIUM AND SULBACTAM SODIUM 3 G: 2; 1 INJECTION, POWDER, FOR SOLUTION INTRAMUSCULAR; INTRAVENOUS at 01:11

## 2024-11-02 RX ADMIN — NIFEDIPINE 60 MG: 60 TABLET, FILM COATED, EXTENDED RELEASE ORAL at 08:11

## 2024-11-02 RX ADMIN — MORPHINE SULFATE 2 MG: 4 INJECTION, SOLUTION INTRAMUSCULAR; INTRAVENOUS at 08:11

## 2024-11-02 RX ADMIN — DOXYCYCLINE HYCLATE 100 MG: 100 TABLET, COATED ORAL at 08:11
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
## 2024-11-03 LAB — TOBRAMYCIN TROUGH SERPL-MCNC: 0.8 UG/ML (ref 0.3–2)

## 2024-11-03 PROCEDURE — 63600175 PHARM REV CODE 636 W HCPCS: Performed by: STUDENT IN AN ORGANIZED HEALTH CARE EDUCATION/TRAINING PROGRAM

## 2024-11-03 PROCEDURE — 21400001 HC TELEMETRY ROOM

## 2024-11-03 PROCEDURE — 63600175 PHARM REV CODE 636 W HCPCS: Performed by: INTERNAL MEDICINE

## 2024-11-03 PROCEDURE — 25000003 PHARM REV CODE 250: Performed by: STUDENT IN AN ORGANIZED HEALTH CARE EDUCATION/TRAINING PROGRAM

## 2024-11-03 PROCEDURE — S0179 MEGESTROL 20 MG: HCPCS | Performed by: STUDENT IN AN ORGANIZED HEALTH CARE EDUCATION/TRAINING PROGRAM

## 2024-11-03 PROCEDURE — A4216 STERILE WATER/SALINE, 10 ML: HCPCS | Performed by: STUDENT IN AN ORGANIZED HEALTH CARE EDUCATION/TRAINING PROGRAM

## 2024-11-03 PROCEDURE — 36415 COLL VENOUS BLD VENIPUNCTURE: CPT | Performed by: INTERNAL MEDICINE

## 2024-11-03 PROCEDURE — 80200 ASSAY OF TOBRAMYCIN: CPT | Performed by: INTERNAL MEDICINE

## 2024-11-03 PROCEDURE — 25000003 PHARM REV CODE 250: Performed by: INTERNAL MEDICINE

## 2024-11-03 PROCEDURE — 25000003 PHARM REV CODE 250: Performed by: NURSE PRACTITIONER

## 2024-11-03 PROCEDURE — 27000207 HC ISOLATION

## 2024-11-03 PROCEDURE — 94799 UNLISTED PULMONARY SVC/PX: CPT

## 2024-11-03 RX ADMIN — SENNOSIDES AND DOCUSATE SODIUM 1 TABLET: 50; 8.6 TABLET ORAL at 08:11

## 2024-11-03 RX ADMIN — METHOCARBAMOL 750 MG: 750 TABLET ORAL at 09:11

## 2024-11-03 RX ADMIN — GABAPENTIN 400 MG: 300 CAPSULE ORAL at 02:11

## 2024-11-03 RX ADMIN — METHOCARBAMOL 750 MG: 750 TABLET ORAL at 05:11

## 2024-11-03 RX ADMIN — ENOXAPARIN SODIUM 80 MG: 80 INJECTION SUBCUTANEOUS at 11:11

## 2024-11-03 RX ADMIN — HYDRALAZINE HYDROCHLORIDE 20 MG: 20 INJECTION INTRAMUSCULAR; INTRAVENOUS at 04:11

## 2024-11-03 RX ADMIN — MEGESTROL ACETATE 200 MG: 400 SUSPENSION ORAL at 09:11

## 2024-11-03 RX ADMIN — AMPICILLIN SODIUM AND SULBACTAM SODIUM 3 G: 2; 1 INJECTION, POWDER, FOR SOLUTION INTRAMUSCULAR; INTRAVENOUS at 05:11

## 2024-11-03 RX ADMIN — ATORVASTATIN CALCIUM 20 MG: 10 TABLET, FILM COATED ORAL at 08:11

## 2024-11-03 RX ADMIN — AMPICILLIN SODIUM AND SULBACTAM SODIUM 3 G: 2; 1 INJECTION, POWDER, FOR SOLUTION INTRAMUSCULAR; INTRAVENOUS at 11:11

## 2024-11-03 RX ADMIN — FLUOXETINE HYDROCHLORIDE 20 MG: 20 CAPSULE ORAL at 09:11

## 2024-11-03 RX ADMIN — AMPICILLIN SODIUM AND SULBACTAM SODIUM 3 G: 2; 1 INJECTION, POWDER, FOR SOLUTION INTRAMUSCULAR; INTRAVENOUS at 12:11

## 2024-11-03 RX ADMIN — LACTULOSE 15 G: 10 SOLUTION ORAL at 09:11

## 2024-11-03 RX ADMIN — OXYBUTYNIN CHLORIDE 5 MG: 5 TABLET ORAL at 08:11

## 2024-11-03 RX ADMIN — CIPROFLOXACIN 400 MG: 2 INJECTION, SOLUTION INTRAVENOUS at 08:11

## 2024-11-03 RX ADMIN — OXYBUTYNIN CHLORIDE 5 MG: 5 TABLET ORAL at 09:11

## 2024-11-03 RX ADMIN — SODIUM CHLORIDE, PRESERVATIVE FREE 10 ML: 5 INJECTION INTRAVENOUS at 06:11

## 2024-11-03 RX ADMIN — MORPHINE SULFATE 2 MG: 4 INJECTION, SOLUTION INTRAMUSCULAR; INTRAVENOUS at 09:11

## 2024-11-03 RX ADMIN — SODIUM CHLORIDE, PRESERVATIVE FREE 10 ML: 5 INJECTION INTRAVENOUS at 12:11

## 2024-11-03 RX ADMIN — OXYBUTYNIN CHLORIDE 5 MG: 5 TABLET ORAL at 02:11

## 2024-11-03 RX ADMIN — CARVEDILOL 25 MG: 12.5 TABLET, FILM COATED ORAL at 08:11

## 2024-11-03 RX ADMIN — OXYCODONE AND ACETAMINOPHEN 1 TABLET: 10; 325 TABLET ORAL at 05:11

## 2024-11-03 RX ADMIN — GABAPENTIN 400 MG: 300 CAPSULE ORAL at 05:11

## 2024-11-03 RX ADMIN — SODIUM CHLORIDE, PRESERVATIVE FREE 10 ML: 5 INJECTION INTRAVENOUS at 05:11

## 2024-11-03 RX ADMIN — TOBRAMYCIN SULFATE 510 MG: 40 INJECTION, SOLUTION INTRAMUSCULAR; INTRAVENOUS at 11:11

## 2024-11-03 RX ADMIN — TRAZODONE HYDROCHLORIDE 200 MG: 100 TABLET ORAL at 08:11

## 2024-11-03 RX ADMIN — SENNOSIDES AND DOCUSATE SODIUM 1 TABLET: 50; 8.6 TABLET ORAL at 09:11

## 2024-11-03 RX ADMIN — CIPROFLOXACIN 400 MG: 2 INJECTION, SOLUTION INTRAVENOUS at 09:11

## 2024-11-03 RX ADMIN — CARVEDILOL 25 MG: 12.5 TABLET, FILM COATED ORAL at 09:11

## 2024-11-03 RX ADMIN — PANTOPRAZOLE SODIUM 40 MG: 40 INJECTION, POWDER, LYOPHILIZED, FOR SOLUTION INTRAVENOUS at 09:11

## 2024-11-03 RX ADMIN — SODIUM CHLORIDE, PRESERVATIVE FREE 10 ML: 5 INJECTION INTRAVENOUS at 11:11

## 2024-11-03 RX ADMIN — DOXYCYCLINE HYCLATE 100 MG: 100 TABLET, COATED ORAL at 08:11

## 2024-11-03 RX ADMIN — DOXYCYCLINE HYCLATE 100 MG: 100 TABLET, COATED ORAL at 09:11

## 2024-11-03 RX ADMIN — ENOXAPARIN SODIUM 80 MG: 80 INJECTION SUBCUTANEOUS at 12:11

## 2024-11-03 RX ADMIN — GABAPENTIN 400 MG: 300 CAPSULE ORAL at 10:11

## 2024-11-03 RX ADMIN — FENOFIBRATE 48 MG: 48 TABLET, FILM COATED ORAL at 09:11

## 2024-11-03 RX ADMIN — NIFEDIPINE 60 MG: 60 TABLET, FILM COATED, EXTENDED RELEASE ORAL at 09:11

## 2024-11-03 RX ADMIN — OXYCODONE AND ACETAMINOPHEN 1 TABLET: 10; 325 TABLET ORAL at 02:11

## 2024-11-03 NOTE — PROGRESS NOTES
Came by to visit Mr. Khan - glabar[place] to see he has had some stability.   Right groin is appropriate and thankfully it appears that he has retained good renal function despite our embolization of the left kidney.   I informed him that the company who provides the coils for his procedure asked to use some of the angiography pictures for education/publication.  It would only involve the angiographic pictures and none of his identifying data would be shared.  He was very thankful for his care and stated that he is glad to have those images used if will educate or help someone.
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
## 2024-11-03 NOTE — PHYSICIAN QUERY
Please provide the pulmonary diagnosis:  Other respiratory diagnosis (please specify): pleural effusions and MDR pseudomonas pneumonia

## 2024-11-03 NOTE — PHYSICIAN QUERY
Please clarify if there is any clinical correlation between catheter and DVT. Are the conditions:  Due to or associated with each other

## 2024-11-03 NOTE — PROGRESS NOTES
"Pharmacokinetic Follow Up: Tobramycin    Assessment of levels:   Trough prior to 5th dose is at goal of < 1 mcg/mL at 0.8 mcg/mL.     Regimen Plan:   Continue current dose: Tobramycin 510 mg IV every 36 hours    Drug levels (last 3 results):  No results for input(s): "AMIKACINPEAK", "AMIKACINTROU", "AMIKACINRAND", "AMIKACIN" in the last 72 hours.    No results for input(s): "GENTAMICIN" in the last 72 hours.    Invalid input(s): "GENTPEAK", "GENTTROUGH", "GENT10", "GENT12", "GENT8", "GENTRANDOM"    Recent Labs   Lab Result Units 11/03/24  0933   Tobramycin Trough ug/ml 0.8       Pharmacy will continue to monitor.    Please contact pharmacy at extension [phone] with any questions regarding this assessment.    Thank you for the consult,   Mary Waller      Patient brief summary:  Bianca Khan is a 59 y.o. male initiated on aminoglycoside therapy for treatment of lower respiratory infection    Drug Allergies:   Review of patient's allergies indicates:   Allergen Reactions    Baclofen Itching and Anxiety       Actual Body Weight:   79.3 kg    Adjust Body Weight:   75.5 kg    Ideal Body Weight:  73 kg    Renal Function:   Estimated Creatinine Clearance: 70.5 mL/min (based on SCr of 1.15 mg/dL).,     Dialysis Method (if applicable):  N/A    CBC (last 72 hours):  Recent Labs   Lab Result Units 11/02/24  0520   WBC x10(3)/mcL 8.11   Hgb g/dL 8.5*   Hct % 26.8*   Platelet x10(3)/mcL 562*   Mono % % 12.2   Eos % % 0.4   Basophil % % 0.4       Metabolic Panel (last 72 hours):  Recent Labs   Lab Result Units 11/02/24  0520   Sodium mmol/L 138   Potassium mmol/L 3.7   Chloride mmol/L 102   CO2 mmol/L 27   Glucose mg/dL 79*   Blood Urea Nitrogen mg/dL 15.6   Creatinine mg/dL 1.15       Aminoglycoside Administrations:  aminoglycosides given in last 96 hours                     tobramycin (NEBCIN) 510 mg in D5W 100 mL IVPB (mg) 510 mg New Bag 11/03/24 1158     510 mg New Bag 11/01/24 2210     510 mg New Bag 10/31/24 1231         "            Microbiologic Results:  Microbiology Results (last 7 days)       ** No results found for the last 168 hours. **

## 2024-11-04 LAB
APTT PPP: 50.7 SECONDS (ref 23.2–33.7)
BASOPHILS # BLD AUTO: 0.01 X10(3)/MCL
BASOPHILS NFR BLD AUTO: 0.1 %
EOSINOPHIL # BLD AUTO: 0.01 X10(3)/MCL (ref 0–0.9)
EOSINOPHIL NFR BLD AUTO: 0.1 %
ERYTHROCYTE [DISTWIDTH] IN BLOOD BY AUTOMATED COUNT: 16 % (ref 11.5–17)
HCT VFR BLD AUTO: 31.6 % (ref 42–52)
HGB BLD-MCNC: 9.8 G/DL (ref 14–18)
IMM GRANULOCYTES # BLD AUTO: 0.07 X10(3)/MCL (ref 0–0.04)
IMM GRANULOCYTES NFR BLD AUTO: 0.7 %
INR PPP: 1.5
LYMPHOCYTES # BLD AUTO: 1.93 X10(3)/MCL (ref 0.6–4.6)
LYMPHOCYTES NFR BLD AUTO: 20.3 %
MCH RBC QN AUTO: 27.4 PG (ref 27–31)
MCHC RBC AUTO-ENTMCNC: 31 G/DL (ref 33–36)
MCV RBC AUTO: 88.3 FL (ref 80–94)
MONOCYTES # BLD AUTO: 0.92 X10(3)/MCL (ref 0.1–1.3)
MONOCYTES NFR BLD AUTO: 9.7 %
NEUTROPHILS # BLD AUTO: 6.55 X10(3)/MCL (ref 2.1–9.2)
NEUTROPHILS NFR BLD AUTO: 69.1 %
NRBC BLD AUTO-RTO: 0 %
PLATELET # BLD AUTO: 559 X10(3)/MCL (ref 130–400)
PMV BLD AUTO: 9 FL (ref 7.4–10.4)
POCT GLUCOSE: 90 MG/DL (ref 70–110)
PROTHROMBIN TIME: 17.7 SECONDS (ref 12.5–14.5)
RBC # BLD AUTO: 3.58 X10(6)/MCL (ref 4.7–6.1)
WBC # BLD AUTO: 9.49 X10(3)/MCL (ref 4.5–11.5)

## 2024-11-04 PROCEDURE — 25000003 PHARM REV CODE 250: Performed by: NURSE PRACTITIONER

## 2024-11-04 PROCEDURE — 63600175 PHARM REV CODE 636 W HCPCS: Performed by: STUDENT IN AN ORGANIZED HEALTH CARE EDUCATION/TRAINING PROGRAM

## 2024-11-04 PROCEDURE — 85025 COMPLETE CBC W/AUTO DIFF WBC: CPT

## 2024-11-04 PROCEDURE — 25000003 PHARM REV CODE 250: Performed by: STUDENT IN AN ORGANIZED HEALTH CARE EDUCATION/TRAINING PROGRAM

## 2024-11-04 PROCEDURE — 36415 COLL VENOUS BLD VENIPUNCTURE: CPT

## 2024-11-04 PROCEDURE — 85730 THROMBOPLASTIN TIME PARTIAL: CPT

## 2024-11-04 PROCEDURE — 21400001 HC TELEMETRY ROOM

## 2024-11-04 PROCEDURE — 27000207 HC ISOLATION

## 2024-11-04 PROCEDURE — 25000003 PHARM REV CODE 250: Performed by: INTERNAL MEDICINE

## 2024-11-04 PROCEDURE — S0179 MEGESTROL 20 MG: HCPCS | Performed by: STUDENT IN AN ORGANIZED HEALTH CARE EDUCATION/TRAINING PROGRAM

## 2024-11-04 PROCEDURE — 63600175 PHARM REV CODE 636 W HCPCS: Performed by: INTERNAL MEDICINE

## 2024-11-04 PROCEDURE — 85610 PROTHROMBIN TIME: CPT

## 2024-11-04 PROCEDURE — A4216 STERILE WATER/SALINE, 10 ML: HCPCS | Performed by: STUDENT IN AN ORGANIZED HEALTH CARE EDUCATION/TRAINING PROGRAM

## 2024-11-04 PROCEDURE — 25000003 PHARM REV CODE 250

## 2024-11-04 RX ORDER — BISACODYL 10 MG/1
10 SUPPOSITORY RECTAL ONCE
Status: COMPLETED | OUTPATIENT
Start: 2024-11-04 | End: 2024-11-04

## 2024-11-04 RX ORDER — HEPARIN SODIUM,PORCINE/D5W 25000/250
0-40 INTRAVENOUS SOLUTION INTRAVENOUS CONTINUOUS
Status: DISPENSED | OUTPATIENT
Start: 2024-11-04 | End: 2024-11-06

## 2024-11-04 RX ADMIN — SODIUM CHLORIDE, PRESERVATIVE FREE 10 ML: 5 INJECTION INTRAVENOUS at 12:11

## 2024-11-04 RX ADMIN — FLUOXETINE HYDROCHLORIDE 20 MG: 20 CAPSULE ORAL at 09:11

## 2024-11-04 RX ADMIN — SODIUM CHLORIDE, PRESERVATIVE FREE 10 ML: 5 INJECTION INTRAVENOUS at 11:11

## 2024-11-04 RX ADMIN — CIPROFLOXACIN 400 MG: 2 INJECTION, SOLUTION INTRAVENOUS at 09:11

## 2024-11-04 RX ADMIN — NIFEDIPINE 60 MG: 60 TABLET, FILM COATED, EXTENDED RELEASE ORAL at 09:11

## 2024-11-04 RX ADMIN — OXYBUTYNIN CHLORIDE 5 MG: 5 TABLET ORAL at 09:11

## 2024-11-04 RX ADMIN — DOXYCYCLINE HYCLATE 100 MG: 100 TABLET, COATED ORAL at 09:11

## 2024-11-04 RX ADMIN — HYDRALAZINE HYDROCHLORIDE 20 MG: 20 INJECTION INTRAMUSCULAR; INTRAVENOUS at 09:11

## 2024-11-04 RX ADMIN — SENNOSIDES AND DOCUSATE SODIUM 1 TABLET: 50; 8.6 TABLET ORAL at 09:11

## 2024-11-04 RX ADMIN — PANTOPRAZOLE SODIUM 40 MG: 40 INJECTION, POWDER, LYOPHILIZED, FOR SOLUTION INTRAVENOUS at 09:11

## 2024-11-04 RX ADMIN — GABAPENTIN 400 MG: 300 CAPSULE ORAL at 09:11

## 2024-11-04 RX ADMIN — LACTULOSE 15 G: 10 SOLUTION ORAL at 02:11

## 2024-11-04 RX ADMIN — CARVEDILOL 25 MG: 12.5 TABLET, FILM COATED ORAL at 09:11

## 2024-11-04 RX ADMIN — SODIUM CHLORIDE, PRESERVATIVE FREE 10 ML: 5 INJECTION INTRAVENOUS at 05:11

## 2024-11-04 RX ADMIN — BISACODYL 10 MG: 10 SUPPOSITORY RECTAL at 02:11

## 2024-11-04 RX ADMIN — AMPICILLIN SODIUM AND SULBACTAM SODIUM 3 G: 2; 1 INJECTION, POWDER, FOR SOLUTION INTRAMUSCULAR; INTRAVENOUS at 05:11

## 2024-11-04 RX ADMIN — AMPICILLIN SODIUM AND SULBACTAM SODIUM 3 G: 2; 1 INJECTION, POWDER, FOR SOLUTION INTRAMUSCULAR; INTRAVENOUS at 12:11

## 2024-11-04 RX ADMIN — ATORVASTATIN CALCIUM 20 MG: 10 TABLET, FILM COATED ORAL at 09:11

## 2024-11-04 RX ADMIN — HYDRALAZINE HYDROCHLORIDE 20 MG: 20 INJECTION INTRAMUSCULAR; INTRAVENOUS at 07:11

## 2024-11-04 RX ADMIN — LACTULOSE 15 G: 10 SOLUTION ORAL at 09:11

## 2024-11-04 RX ADMIN — TRAZODONE HYDROCHLORIDE 200 MG: 100 TABLET ORAL at 09:11

## 2024-11-04 RX ADMIN — OXYCODONE AND ACETAMINOPHEN 1 TABLET: 10; 325 TABLET ORAL at 05:11

## 2024-11-04 RX ADMIN — TOBRAMYCIN SULFATE 510 MG: 40 INJECTION, SOLUTION INTRAMUSCULAR; INTRAVENOUS at 10:11

## 2024-11-04 RX ADMIN — ENOXAPARIN SODIUM 80 MG: 80 INJECTION SUBCUTANEOUS at 12:11

## 2024-11-04 RX ADMIN — GABAPENTIN 400 MG: 300 CAPSULE ORAL at 05:11

## 2024-11-04 RX ADMIN — FENOFIBRATE 48 MG: 48 TABLET, FILM COATED ORAL at 09:11

## 2024-11-04 RX ADMIN — OXYBUTYNIN CHLORIDE 5 MG: 5 TABLET ORAL at 02:11

## 2024-11-04 RX ADMIN — MEGESTROL ACETATE 200 MG: 400 SUSPENSION ORAL at 09:11

## 2024-11-04 RX ADMIN — ONDANSETRON 4 MG: 2 INJECTION INTRAMUSCULAR; INTRAVENOUS at 01:11

## 2024-11-04 RX ADMIN — OXYCODONE AND ACETAMINOPHEN 1 TABLET: 10; 325 TABLET ORAL at 09:11

## 2024-11-04 RX ADMIN — GABAPENTIN 400 MG: 300 CAPSULE ORAL at 01:11

## 2024-11-04 RX ADMIN — OXYCODONE AND ACETAMINOPHEN 1 TABLET: 10; 325 TABLET ORAL at 01:11

## 2024-11-04 RX ADMIN — AMPICILLIN SODIUM AND SULBACTAM SODIUM 3 G: 2; 1 INJECTION, POWDER, FOR SOLUTION INTRAMUSCULAR; INTRAVENOUS at 11:11
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
## 2024-11-04 NOTE — CONSULTS
Inpatient Nutrition Assessment    Admit Date: 10/8/2024   Total duration of encounter: 27 days   Patient Age: 59 y.o.    Nutrition Recommendation/Prescription     -Continue Heart Healthy, Diabetic Diet as tolerated. Assist with meals and encourage intake.  -Continue Boost VHC (pt requests strawberry flavor only) once daily to provide 530 kcal and 22 gm protein per container.   -Continue Malachi BID for wound healing.  -Continue appetite stimulant as medically feasible.   -Monitor wt, labs, and intake.     Communication of Recommendations: reviewed with patient    Nutrition Assessment     Malnutrition Assessment/Nutrition-Focused Physical Exam    Malnutrition Context: acute illness or injury (10/17/24 1218)  Malnutrition Level: moderate (10/17/24 1218)  Energy Intake (Malnutrition):  (family denies) (10/17/24 1218)  Weight Loss (Malnutrition):  (does not meet criteria) (10/17/24 1218)  Subcutaneous Fat (Malnutrition): mild depletion (10/17/24 1218)     Upper Arm Region (Subcutaneous Fat Loss): mild depletion     Muscle Mass (Malnutrition): mild depletion (10/17/24 1218)     Clavicle Bone Region (Muscle Loss): mild depletion                    Fluid Accumulation (Malnutrition): mild (10/17/24 1218)        A minimum of two characteristics is recommended for diagnosis of either severe or non-severe malnutrition.    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 including ESBL E coli, Enterobacter, carbapenem resistant Pseudomonas, Enterococcus faecalis     Scheduled Medications:  ampicillin-sulbactam, 3 g, Q6H  atorvastatin, 20 mg, Nightly  carvediloL, 25 mg, BID  ciprofloxacin, 400 mg, Q12H  doxycycline, 100 mg, Q12H  enoxparin, 1 mg/kg, Q12H (treatment, non-standard time)  fenofibrate, 48 mg, Daily  FLUoxetine, 20 mg, Daily  gabapentin, 400 mg, Q8H  lactulose 10 gram/15 ml, 15 g, TID  megestroL, 200 mg, Daily  NIFEdipine, 60 mg, Daily  oxybutynin, 5 mg, TID  pantoprazole, 40 mg, Daily  senna-docusate 8.6-50 mg, 1 tablet, BID  sodium chloride 0.9%, 10 mL, Q6H  tobramycin IV (PEDS and ADULTS), 7 mg/kg (Ideal), Q36H    Continuous Infusions:     PRN Medications:   0.9%  NaCl infusion (for blood administration), , Q24H PRN  acetaminophen, 650 mg, Q4H PRN  albuterol-ipratropium, 3 mL, Q4H PRN  aluminum-magnesium hydroxide-simethicone, 30 mL, QID PRN  dextrose 10%, 12.5 g, PRN  dextrose 10%, 25 g, PRN  diphenhydrAMINE, 50 mg, Q6H PRN  etomidate, , Code/trauma/sedation Med  glucagon (human recombinant), 1 mg, PRN  glucose, 16 g, PRN  glucose, 24 g, PRN  guaiFENesin 100 mg/5 ml, 200 mg, Q4H PRN  hydrALAZINE, 20 mg, Q4H PRN  hyoscyamine, 0.125 mg, Q4H PRN  insulin aspart U-100, 1-10 Units, QID (AC + HS) PRN  methocarbamoL, 750 mg, TID PRN  morphine, 2 mg, BID PRN  ondansetron, 4 mg, Q4H PRN  oxyCODONE-acetaminophen, 1 tablet, Q4H PRN  polyethylene glycol, 17 g, BID PRN  prochlorperazine, 5 mg, Q6H PRN  rocuronium, , Code/trauma/sedation Med  sodium chloride 0.9%, 10 mL, PRN  sodium chloride 0.9%, 10 mL, PRN  tobramycin - pharmacy to dose, , pharmacy to manage frequency  traZODone, 200 mg, Nightly PRN    Calorie Containing IV Medications: no significant kcals from medications at this time    Recent Labs   Lab 10/29/24  1042 10/30/24  1208 10/30/24  1209 10/31/24  0709 11/02/24  0520   * 133*  --  136 138   K 4.4 3.6  --  3.7 3.7   CALCIUM 8.0* 8.4*  --  8.3* 8.4*     98  --  100 102   CO2 22* 28  --  27 27   BUN 13.7 16.8  --  17.2 15.6   CREATININE 1.21 1.26*  --  1.24 1.15   EGFRNORACEVR >60 >60  --  >60 >60   GLUCOSE 109 87  --  75* 79*   BILITOT 1.0 1.1  --  1.0  --    ALKPHOS 87 83  --  83  --    ALT 18 14  --  13  --    AST 26 23  --  22  --    ALBUMIN 1.9* 1.8*  --  1.8*  --    WBC 13.49*  --  11.35 11.44 8.11   HGB 9.8*  --  8.6* 8.7* 8.5*   HCT 31.1*  --  26.2* 26.6* 26.8*     Nutrition Orders:  Diet Adult Regular Cardiac (Low Na/Chol), Diabetic; 2000 Calories (up to 75 gm per meal)  Dietary nutrition supplements BID; Malachi - Fruit Punch,Dietary nutrition supplements Daily; Boost Very High Calorie Nutritional Drink - Strawberry    Appetite/Oral Intake: fair/25-50% of meals  Factors Affecting Nutritional Intake: decreased appetite  Social Needs Impacting Access to Food: none identified  Food/[employer]/Cultural Preferences: none reported  Food Allergies: none reported  Last Bowel Movement: 10/31/24  Wound(s):     Altered Skin Integrity 06/28/23 2230 Right lateral Ankle #6-Tissue loss description: Full thickness       Wound 05/30/24 0000 Pressure Injury Right Buttocks-Tissue loss description: Full thickness       Altered Skin Integrity 01/09/24 0848 upper Sacral spine-Tissue loss description: Full thickness       Wound 08/22/24 0800 Other (comment) Left Heel-Tissue loss description: Full thickness    Comments    10/10: Pt was in surgery at time of visit. Having wound debridement with wound vac placement on Stage 4 wound of rt gluteus. Will add Malachi to assist with wound healing. Recommend vitamin regimen for wounds.     10/17/24 Patient transferred to ICU, on ventilator currently, no propofol. Spoke with patient's wife at bedside who reports patient was eating well with a good appetite prior to ICU transfer, she reports bringing him food from home, good intake of Malachi. Tube feeding recommendation provided.    10/18/24 Consult received for tube feeding, will  "order.    10/22/24: Per RN, pt did not want to really eat earlier; family present in the room and encouraging intake.     10/24/24: Per pt's family member, pt ate ~ half of breakfast. Pt denies n/v; would like a strawberry flavored oral supplement.     10/29/24: Pt still with decreased appetite; denies n/v; taking his oral supplements.     24: Pt with fair intake, eating a lot of outside foods; now on appetite stimulant; taking his Malachi; would only like Boost VHC once daily.     24: Per RN, taking in some; still on appetite stimulant.     Anthropometrics    Height: 5' 10" (177.8 cm), Height Method: Stated  Last Weight: 79.3 kg (174 lb 13.2 oz) (10/17/24 1212), Weight Method: Bed Scale  BMI (Calculated): 25.1  BMI Classification: normal (BMI 18.5-24.9)        Ideal Body Weight (IBW), Male: 166 lb     % Ideal Body Weight, Male (lb): 96.95 %                 Usual Body Weight (UBW), k.57 kg-77.11 kg  % Usual Body Weight: 109.5  % Weight Change From Usual Weight: 9.27 %  Usual Weight Provided By: family/caregiver    Wt Readings from Last 5 Encounters:   10/17/24 79.3 kg (174 lb 13.2 oz)   24 63.5 kg (139 lb 15.9 oz)   24 71.2 kg (156 lb 15.5 oz)   24 78.5 kg (173 lb 1 oz)   01/10/24 78.5 kg (173 lb 1 oz)     Weight Change(s) Since Admission:   (10/17) took bed weight 79.3 kg during rounds (estimated 4 kg subtracted for equipment), increase noted  Wt Readings from Last 1 Encounters:   10/17/24 1212 79.3 kg (174 lb 13.2 oz)   10/09/24 0430 73 kg (160 lb 15 oz)   10/08/24 1719 63.5 kg (140 lb)   Admit Weight: 63.5 kg (140 lb) (10/08/24 1719), Weight Method: Stated    Estimated Needs    Weight Used For Calorie Calculations: 79.3 kg (174 lb 13.2 oz)  Energy Calorie Requirements (kcal):  kcal (25-30 kcal/kg)  Energy Need Method: Kcal/kg  Weight Used For Protein Calculations: 79.3 kg (174 lb 13.2 oz)  Protein Requirements: 95 gm (1.2g/kg)  Fluid Requirements (mL):  mL  CHO " Requirement: 262-315 gm (45% EEN)  Last Updated: 10/22    Enteral Nutrition Patient not receiving enteral nutrition at this time.    Parenteral Nutrition Patient not receiving parenteral nutrition support at this time.    Evaluation of Received Nutrient Intake    Calories: not meeting estimated needs  Protein: not meeting estimated needs    Patient Education Not applicable.    Nutrition Diagnosis     PES: Inadequate energy intake related to inability to consume sufficient nutrients as evidenced by less than 80% needs met. (active)  PES: Moderate acute disease or injury related malnutrition related to acute illness as evidenced by mild fat depletion, mild muscle depletion, and edema. (active)    Nutrition Interventions     Intervention(s): modified composition of enteral nutrition, modified rate of enteral nutrition, and collaboration with other providers  Goal: Meet greater than 80% of nutritional needs by follow-up. (goal progressing)  Goal: Tolerate enteral feeding at goal rate by follow-up. (goal discontinued)    Nutrition Goals & Monitoring     Dietitian will monitor: food and beverage intake, energy intake, weight, and electrolyte/renal panel  Discharge planning: continue Heart Healthy, Diabetic diet with Boost VHC/Malachi oral supplements  Nutrition Risk/Follow-Up: high (follow-up in 1-4 days)   Please consult if re-assessment needed sooner.

## 2024-11-05 ENCOUNTER — ANESTHESIA EVENT (OUTPATIENT)
Dept: SURGERY | Facility: HOSPITAL | Age: 59
DRG: 853 | End: 2024-11-05
Payer: MEDICARE

## 2024-11-05 LAB
ANION GAP SERPL CALC-SCNC: 11 MEQ/L
APTT PPP: 147.6 SECONDS (ref 23.2–33.7)
APTT PPP: 77.8 SECONDS (ref 23.2–33.7)
APTT PPP: 78.3 SECONDS (ref 23.2–33.7)
BASOPHILS # BLD AUTO: 0.02 X10(3)/MCL
BASOPHILS NFR BLD AUTO: 0.2 %
BUN SERPL-MCNC: 18.2 MG/DL (ref 8.4–25.7)
CALCIUM SERPL-MCNC: 8.7 MG/DL (ref 8.8–10)
CHLORIDE SERPL-SCNC: 103 MMOL/L (ref 98–107)
CO2 SERPL-SCNC: 23 MMOL/L (ref 23–31)
CREAT SERPL-MCNC: 1.27 MG/DL (ref 0.72–1.25)
CREAT/UREA NIT SERPL: 14
EOSINOPHIL # BLD AUTO: 0.03 X10(3)/MCL (ref 0–0.9)
EOSINOPHIL NFR BLD AUTO: 0.4 %
ERYTHROCYTE [DISTWIDTH] IN BLOOD BY AUTOMATED COUNT: 16.2 % (ref 11.5–17)
GFR SERPLBLD CREATININE-BSD FMLA CKD-EPI: >60 ML/MIN/1.73/M2
GLUCOSE SERPL-MCNC: 85 MG/DL (ref 82–115)
HCT VFR BLD AUTO: 27.1 % (ref 42–52)
HGB BLD-MCNC: 8.7 G/DL (ref 14–18)
IMM GRANULOCYTES # BLD AUTO: 0.04 X10(3)/MCL (ref 0–0.04)
IMM GRANULOCYTES NFR BLD AUTO: 0.5 %
LYMPHOCYTES # BLD AUTO: 1.53 X10(3)/MCL (ref 0.6–4.6)
LYMPHOCYTES NFR BLD AUTO: 18.1 %
MAGNESIUM SERPL-MCNC: 1.6 MG/DL (ref 1.6–2.6)
MCH RBC QN AUTO: 27.8 PG (ref 27–31)
MCHC RBC AUTO-ENTMCNC: 32.1 G/DL (ref 33–36)
MCV RBC AUTO: 86.6 FL (ref 80–94)
MONOCYTES # BLD AUTO: 0.76 X10(3)/MCL (ref 0.1–1.3)
MONOCYTES NFR BLD AUTO: 9 %
NEUTROPHILS # BLD AUTO: 6.06 X10(3)/MCL (ref 2.1–9.2)
NEUTROPHILS NFR BLD AUTO: 71.8 %
NRBC BLD AUTO-RTO: 0 %
PHOSPHATE SERPL-MCNC: 3.6 MG/DL (ref 2.3–4.7)
PLATELET # BLD AUTO: 550 X10(3)/MCL (ref 130–400)
PMV BLD AUTO: 9.2 FL (ref 7.4–10.4)
POTASSIUM SERPL-SCNC: 3.9 MMOL/L (ref 3.5–5.1)
RBC # BLD AUTO: 3.13 X10(6)/MCL (ref 4.7–6.1)
SODIUM SERPL-SCNC: 137 MMOL/L (ref 136–145)
WBC # BLD AUTO: 8.44 X10(3)/MCL (ref 4.5–11.5)

## 2024-11-05 PROCEDURE — 25000003 PHARM REV CODE 250: Performed by: NURSE PRACTITIONER

## 2024-11-05 PROCEDURE — 63600175 PHARM REV CODE 636 W HCPCS: Performed by: STUDENT IN AN ORGANIZED HEALTH CARE EDUCATION/TRAINING PROGRAM

## 2024-11-05 PROCEDURE — 99900035 HC TECH TIME PER 15 MIN (STAT)

## 2024-11-05 PROCEDURE — 25000003 PHARM REV CODE 250: Performed by: STUDENT IN AN ORGANIZED HEALTH CARE EDUCATION/TRAINING PROGRAM

## 2024-11-05 PROCEDURE — A4216 STERILE WATER/SALINE, 10 ML: HCPCS | Performed by: STUDENT IN AN ORGANIZED HEALTH CARE EDUCATION/TRAINING PROGRAM

## 2024-11-05 PROCEDURE — 63600175 PHARM REV CODE 636 W HCPCS: Performed by: INTERNAL MEDICINE

## 2024-11-05 PROCEDURE — 84100 ASSAY OF PHOSPHORUS: CPT

## 2024-11-05 PROCEDURE — 80048 BASIC METABOLIC PNL TOTAL CA: CPT

## 2024-11-05 PROCEDURE — S0179 MEGESTROL 20 MG: HCPCS | Performed by: STUDENT IN AN ORGANIZED HEALTH CARE EDUCATION/TRAINING PROGRAM

## 2024-11-05 PROCEDURE — 63600175 PHARM REV CODE 636 W HCPCS

## 2024-11-05 PROCEDURE — 25000003 PHARM REV CODE 250: Performed by: INTERNAL MEDICINE

## 2024-11-05 PROCEDURE — 85025 COMPLETE CBC W/AUTO DIFF WBC: CPT

## 2024-11-05 PROCEDURE — 36415 COLL VENOUS BLD VENIPUNCTURE: CPT

## 2024-11-05 PROCEDURE — 27000207 HC ISOLATION

## 2024-11-05 PROCEDURE — 83735 ASSAY OF MAGNESIUM: CPT

## 2024-11-05 PROCEDURE — 97606 NEG PRS WND THER DME>50 SQCM: CPT

## 2024-11-05 PROCEDURE — 85730 THROMBOPLASTIN TIME PARTIAL: CPT

## 2024-11-05 PROCEDURE — 21400001 HC TELEMETRY ROOM

## 2024-11-05 PROCEDURE — 94668 MNPJ CHEST WALL SBSQ: CPT

## 2024-11-05 RX ORDER — MAGNESIUM SULFATE HEPTAHYDRATE 40 MG/ML
2 INJECTION, SOLUTION INTRAVENOUS ONCE
Status: COMPLETED | OUTPATIENT
Start: 2024-11-05 | End: 2024-11-05

## 2024-11-05 RX ADMIN — HYDRALAZINE HYDROCHLORIDE 20 MG: 20 INJECTION INTRAMUSCULAR; INTRAVENOUS at 05:11

## 2024-11-05 RX ADMIN — GABAPENTIN 400 MG: 300 CAPSULE ORAL at 05:11

## 2024-11-05 RX ADMIN — CIPROFLOXACIN 400 MG: 2 INJECTION, SOLUTION INTRAVENOUS at 10:11

## 2024-11-05 RX ADMIN — MEGESTROL ACETATE 200 MG: 400 SUSPENSION ORAL at 09:11

## 2024-11-05 RX ADMIN — HEPARIN SODIUM 14 UNITS/KG/HR: 10000 INJECTION, SOLUTION INTRAVENOUS at 03:11

## 2024-11-05 RX ADMIN — AMPICILLIN SODIUM AND SULBACTAM SODIUM 3 G: 2; 1 INJECTION, POWDER, FOR SOLUTION INTRAMUSCULAR; INTRAVENOUS at 05:11

## 2024-11-05 RX ADMIN — AMPICILLIN SODIUM AND SULBACTAM SODIUM 3 G: 2; 1 INJECTION, POWDER, FOR SOLUTION INTRAMUSCULAR; INTRAVENOUS at 12:11

## 2024-11-05 RX ADMIN — NIFEDIPINE 60 MG: 60 TABLET, FILM COATED, EXTENDED RELEASE ORAL at 09:11

## 2024-11-05 RX ADMIN — PANTOPRAZOLE SODIUM 40 MG: 40 INJECTION, POWDER, LYOPHILIZED, FOR SOLUTION INTRAVENOUS at 09:11

## 2024-11-05 RX ADMIN — GABAPENTIN 400 MG: 300 CAPSULE ORAL at 09:11

## 2024-11-05 RX ADMIN — TRAZODONE HYDROCHLORIDE 200 MG: 100 TABLET ORAL at 09:11

## 2024-11-05 RX ADMIN — METHOCARBAMOL 750 MG: 750 TABLET ORAL at 09:11

## 2024-11-05 RX ADMIN — OXYCODONE AND ACETAMINOPHEN 1 TABLET: 10; 325 TABLET ORAL at 09:11

## 2024-11-05 RX ADMIN — AMPICILLIN SODIUM AND SULBACTAM SODIUM 3 G: 2; 1 INJECTION, POWDER, FOR SOLUTION INTRAMUSCULAR; INTRAVENOUS at 11:11

## 2024-11-05 RX ADMIN — DOXYCYCLINE HYCLATE 100 MG: 100 TABLET, COATED ORAL at 09:11

## 2024-11-05 RX ADMIN — SODIUM CHLORIDE, PRESERVATIVE FREE 10 ML: 5 INJECTION INTRAVENOUS at 11:11

## 2024-11-05 RX ADMIN — MORPHINE SULFATE 2 MG: 4 INJECTION, SOLUTION INTRAMUSCULAR; INTRAVENOUS at 09:11

## 2024-11-05 RX ADMIN — FENOFIBRATE 48 MG: 48 TABLET, FILM COATED ORAL at 09:11

## 2024-11-05 RX ADMIN — OXYBUTYNIN CHLORIDE 5 MG: 5 TABLET ORAL at 02:11

## 2024-11-05 RX ADMIN — SODIUM CHLORIDE, PRESERVATIVE FREE 10 ML: 5 INJECTION INTRAVENOUS at 05:11

## 2024-11-05 RX ADMIN — OXYCODONE AND ACETAMINOPHEN 1 TABLET: 10; 325 TABLET ORAL at 12:11

## 2024-11-05 RX ADMIN — GABAPENTIN 400 MG: 300 CAPSULE ORAL at 02:11

## 2024-11-05 RX ADMIN — CIPROFLOXACIN 400 MG: 2 INJECTION, SOLUTION INTRAVENOUS at 09:11

## 2024-11-05 RX ADMIN — METHOCARBAMOL 750 MG: 750 TABLET ORAL at 05:11

## 2024-11-05 RX ADMIN — OXYBUTYNIN CHLORIDE 5 MG: 5 TABLET ORAL at 09:11

## 2024-11-05 RX ADMIN — OXYCODONE AND ACETAMINOPHEN 1 TABLET: 10; 325 TABLET ORAL at 05:11

## 2024-11-05 RX ADMIN — CARVEDILOL 25 MG: 12.5 TABLET, FILM COATED ORAL at 09:11

## 2024-11-05 RX ADMIN — MAGNESIUM SULFATE HEPTAHYDRATE 2 G: 40 INJECTION, SOLUTION INTRAVENOUS at 01:11

## 2024-11-05 RX ADMIN — SENNOSIDES AND DOCUSATE SODIUM 1 TABLET: 50; 8.6 TABLET ORAL at 09:11

## 2024-11-05 RX ADMIN — ATORVASTATIN CALCIUM 20 MG: 10 TABLET, FILM COATED ORAL at 09:11

## 2024-11-05 RX ADMIN — FLUOXETINE HYDROCHLORIDE 20 MG: 20 CAPSULE ORAL at 09:11

## 2024-11-05 RX ADMIN — LACTULOSE 15 G: 10 SOLUTION ORAL at 02:11

## 2024-11-05 RX ADMIN — SODIUM CHLORIDE, PRESERVATIVE FREE 10 ML: 5 INJECTION INTRAVENOUS at 12:11

## 2024-11-05 RX ADMIN — HEPARIN SODIUM 18 UNITS/KG/HR: 10000 INJECTION, SOLUTION INTRAVENOUS at 05:11

## 2024-11-05 NOTE — PLAN OF CARE
Problem: Adult Inpatient Plan of Care  Goal: Plan of Care Review  Outcome: Progressing  Goal: Patient-Specific Goal (Individualized)  Outcome: Progressing  Goal: Absence of Hospital-Acquired Illness or Injury  Outcome: Progressing  Goal: Optimal Comfort and Wellbeing  Outcome: Progressing  Goal: Readiness for Transition of Care  Outcome: Progressing     Problem: Diabetes Comorbidity  Goal: Blood Glucose Level Within Targeted Range  Outcome: Progressing     Problem: Infection  Goal: Absence of Infection Signs and Symptoms  Outcome: Progressing     Problem: Wound  Goal: Optimal Coping  Outcome: Progressing  Goal: Optimal Functional Ability  Outcome: Progressing  Goal: Absence of Infection Signs and Symptoms  Outcome: Progressing  Goal: Improved Oral Intake  Outcome: Progressing  Goal: Optimal Pain Control and Function  Outcome: Progressing  Goal: Skin Health and Integrity  Outcome: Progressing  Goal: Optimal Wound Healing  Outcome: Progressing     Problem: Skin Injury Risk Increased  Goal: Skin Health and Integrity  Outcome: Progressing     Problem: Pain Acute  Goal: Optimal Pain Control and Function  Outcome: Progressing     Problem: Fall Injury Risk  Goal: Absence of Fall and Fall-Related Injury  Outcome: Progressing      details… detailed exam

## 2024-11-05 NOTE — CONSULTS
[facility] - 8th Floor Med Surg  Wound Care    Patient Name:  Bianca Khan   MRN:  [id number]  Date: 2024  Diagnosis: Sacral wound    History:     Past Medical History:   Diagnosis Date    Arthritis     Chronic ulcer of ankle 2022    ESBL (extended spectrum beta-lactamase) producing bacteria infection 2024    Frequent UTI 2019    Generalized anxiety disorder 2022    Neurogenic bladder 2022    Osteomyelitis 2022    Paraplegia     Presence of suprapubic catheter 2022    Pure hypercholesterolemia 2022    Retention of urine, unspecified 2019    Spinal cord injury at T1-T6 level 2018       Social History     Socioeconomic History    Marital status:    Tobacco Use    Smoking status: Some Days     Current packs/day: 0.00     Average packs/day: 0.2 packs/day for 41.5 years (10.4 ttl pk-yrs)     Types: Cigars, Cigarettes     Start date: 1982     Last attempt to quit: 2023     Years since quittin.2    Smokeless tobacco: Never   Substance and Sexual Activity    Alcohol use: Not Currently     Alcohol/week: 2.0 standard drinks of alcohol     Types: 2 Cans of beer per week    Drug use: Not Currently     Types: Oxycodone    Sexual activity: Not Currently     Partners: Female     Birth control/protection: None     Social Drivers of Health     Financial Resource Strain: Low Risk  (10/10/2024)    Overall Financial Resource Strain (CARDIA)     Difficulty of Paying Living Expenses: Not hard at all   Food Insecurity: No Food Insecurity (10/10/2024)    Hunger Vital Sign     Worried About Running Out of Food in the Last Year: Never true     Ran Out of Food in the Last Year: Never true   Transportation Needs: No Transportation Needs (10/10/2024)    TRANSPORTATION NEEDS     Transportation : No   Physical Activity: Inactive (2023)    Exercise Vital Sign     Days of Exercise per Week: 0 days     Minutes of Exercise per Session: 0 min    Stress: No Stress Concern Present (10/10/2024)    [employer] - Occupational Stress Questionnaire     Feeling of Stress : Only a little   Housing Stability: Low Risk  (10/10/2024)    Housing Stability Vital Sign     Unable to Pay for Housing in the Last Year: No     Homeless in the Last Year: No       Precautions:     Allergies as of 10/08/2024 - Reviewed 10/08/2024   Allergen Reaction Noted    Baclofen Itching and Anxiety 06/17/2019       WOC Assessment Details/Treatment      11/05/24 1126   WOCN Assessment   Visit Date 11/05/24   Visit Time 1126   Consult Type Follow Up;New   WOCN Speciality Wound;Ostomy   WOCN List wound vac;colostomy  (colostomy marking)   Procedure wound vac   Intervention chart review;assessed;changed   Teaching on-going   Marked yes        Altered Skin Integrity 06/28/23 2230 Right lateral Ankle #6   Date First Assessed/Time First Assessed: 06/28/23 2230   Altered Skin Integrity Present on Admission - Did Patient arrive to the hospital with altered skin?: yes  Side: Right  Orientation: lateral  Location: Ankle  Wound Number: #6  Is this injury dev...   Wound Image    Description of Altered Skin Integrity Full thickness tissue loss with exposed bone, tendon, or muscle. Often includes undermining and tunneling. May extend into muscle and/or supporting structures.   Dressing Appearance Intact;Moist drainage   Drainage Amount Small   Drainage Characteristics/Odor Creamy   Appearance Pink;Yellow   Tissue loss description Full thickness   Black (%), Wound Tissue Color 0 %   Red (%), Wound Tissue Color 90 %   Yellow (%), Wound Tissue Color 10 %   Periwound Area Intact;Dry  (callused)   Wound Edges Callused   Wound Length (cm) 1.6 cm   Wound Width (cm) 1 cm   Wound Depth (cm) 0.2 cm   Wound Volume (cm^3) 0.32 cm^3   Wound Surface Area (cm^2) 1.6 cm^2   Care Cleansed with:;Antimicrobial agent  (vashe)   Dressing Changed;Calcium alginate;Silver;Gauze   Off Loading Off  loading shoe        Wound 05/30/24 0000 Pressure Injury Right Buttocks   Date First Assessed/Time First Assessed: 05/30/24 0000   Primary Wound Type: Pressure Injury  Side: Right  Location: Buttocks  Is this injury device related?: No   Wound Image    Pressure Injury Stage U   Dressing Appearance Dry;Intact;Clean   Drainage Amount Small   Drainage Characteristics/Odor Serosanguineous   Appearance Pink;Red;Yellow   Tissue loss description Full thickness   Black (%), Wound Tissue Color 0 %   Red (%), Wound Tissue Color 90 %   Yellow (%), Wound Tissue Color 10 %   Periwound Area Intact;Macerated;Redness   Wound Edges Defined   Wound Length (cm) 5 cm   Wound Width (cm) 6 cm   Wound Depth (cm) 3 cm   Wound Volume (cm^3) 90 cm^3   Wound Surface Area (cm^2) 30 cm^2   Care Cleansed with:;Antimicrobial agent  (vashe)   Dressing Removed;Applied  (NPWT)   Off Loading Other (see comments)  (immerse MARY bed/purple wedge)   Dressing Change Due 11/08/24        Wound 08/22/24 0800 Other (comment) Left Heel   Date First Assessed/Time First Assessed: 08/22/24 0800   Primary Wound Type: Other (comment)  Side: Left  Location: Heel   Wound Image    Dressing Appearance Intact;Moist drainage   Drainage Amount Small   Drainage Characteristics/Odor Creamy;Serosanguineous   Appearance Red   Tissue loss description Full thickness   Black (%), Wound Tissue Color 0 %   Red (%), Wound Tissue Color 100 %   Yellow (%), Wound Tissue Color 0 %   Periwound Area Intact;Dry   Wound Edges Callused   Wound Length (cm) 1.7 cm   Wound Width (cm) 2.4 cm   Wound Depth (cm) 0.2 cm   Wound Volume (cm^3) 0.816 cm^3   Wound Surface Area (cm^2) 4.08 cm^2   Care Cleansed with:;Antimicrobial agent  (vashe)   Dressing Changed;Calcium alginate;Silver;Gauze;Rolled gauze        Negative Pressure Wound Therapy  10/10/24 1017   Placement Date/Time: 10/10/24 1017   Location: Buttocks  Additional Comments: SN: [id number]   NPWT Type Vacuum Therapy   Therapy Setting NPWT  "Continuous therapy   Pressure Setting NPWT 125 mmHg   Therapy Interventions NPWT Dressing changed;Seal intact   Sponges Inserted NPWT 1;White;Black  (1 white foam, 1 black foam tracked to another black foam)   Sponges Removed NPWT 1;White;Black     WOCN requested to provide site selection or " marking for proposed end colostomy placement". Discussed POC w/ pt.'s nurse Gisselle who already changed pt.'s sacral dressing. Family at bedside. Introduced self and explained procedures. Assessed his abdomen in lying and semi seated positions to evaluate for most appropriate site for colostomy placement in his LLQ, with consideration of his normal clothing attire and ADL's. Selected site within his rectus abdominal muscles and provided surgical site marking and covered with transparent dressing, with his approval. Reported same to assigned nurse. Preoperative site markings are a guide. Final selection is done by the surgeon during the operative procedure.     WOCN follow up for re-application of wound vac to right buttock and follow up for Left heel and right lateral ankle. Had assistance from Jaime from wound care team w/ wound vac placement. Wound vac application change to right buttock- 1 black foam removed. Cleaned wound w/ vashe, 1 white foam, 1 black foam tracked to another black foam, seal intact w/ no leakage or blockage detected w/ setting at 125mmHg. Pt. Tolerated application well with no signs of pain or discomfort. Cont. Tx recs in orders for sacral and right lateral ankle. Treatment recommendations for left heel: Clean w/ vashe, dry well, apply Ca+ Ag to red/good tissue wound bed, abd pad, kerlix, and secure w/ tape. BID/Prn if soilage. Cont tx recs and orders for sacrum (vashe wet to dry, abd pad, tape) and Right lateral ankle: clean w/ vashe, dry well, apply Ca+ Ag cloth to wound bed, gauze, secure w/ tape. Nursing to cont. Tx recs and preventative measures. Will follow up tomorrow.    11/05/2024    "
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
## 2024-11-05 NOTE — PROGRESS NOTES
Infectious Diseases Progress Note  58 y/o male with past medical history of T-spine cord injury with paraplegia, diabetes, HTN, hepatitis-C, chronic MRSA L ankle wound/osteomyelitis, was on suppressive doxycycline and R knee infection/septic arthritis and osteomyelitis with GBS/Enterococcus and Ecoli isolates who presented to ER on 10/8 with increased generalized pain, increased foul smelling drainage from sacral wound with fever and chills. He was noted to be hypotensive per EMS. On admit he was afebrile without leukocytosis. ESR 98 and .80. MRSA PCR (-). U/A with 21-50 WBC, 0-5 RBC, 75 LE, no bacteria, negative nitrites. Urine culture negative. Blood cultures negative. Sacral wound culture with many Acinetobacter, many ESBL Ecoli, moderate Enterococcus, Bacteroides and Peptoniphilus. CT pelvis with contrast showed worsening inflammatory change of the sacral decubitus ulcers with gas now identified inferior to the right pubic ramus, stool noted throughout the colon as may be seen with constipation, pyelonephritis is not excluded. Seen by Surgery and underwent debridement of sacral wound. During his stay he was noted to have large amounts of bleeding from sacral wound. CT abdomen/pelvis on 10/17 showed interval development of L renal rupture with large subcapsular hematoma, L retroperitoneal hemorrhage. He received multiple blood/blood products over the past couple days. He was noted to be hypotensive and was transferred to ICU on 10/17. He was seen by Vascular and underwent Aortogram,  left renal angiography with coil embolization of left renal artery and right groin central venous line insertion on 10/17. Sputum culture on 108/23 revealing Pseudomonas. RUE venous ultrasound positive for deep  vein thrombosis in brachial and radial veins of the right upper extremity. Sputum culture with MDR Pseudomonas  He is currently on Unasyn, Cipro and Tobramycin. Remains on chronic oral suppression with  Doxycycline    Subjective:  Lying in bed, no acute distress. Currently on 1L NC. No new complaints reported. Afebrile.    Review of Systems   Constitutional:  Positive for malaise/fatigue.   HENT: Negative.     Eyes: Negative.    Respiratory:  Positive for shortness of breath.    Cardiovascular: Negative.    Gastrointestinal: Negative.    Musculoskeletal: Negative.    Skin: Negative.    Neurological:  Positive for focal weakness and weakness.   All other systems reviewed and are negative.      Review of patient's allergies indicates:   Allergen Reactions    Baclofen Itching and Anxiety       Past Medical History:   Diagnosis Date    Arthritis     Chronic ulcer of ankle 05/26/2022    ESBL (extended spectrum beta-lactamase) producing bacteria infection 08/30/2024    Frequent UTI 07/02/2019    Generalized anxiety disorder 05/26/2022    Neurogenic bladder 05/26/2022    Osteomyelitis 05/26/2022    Paraplegia     Presence of suprapubic catheter 05/26/2022    Pure hypercholesterolemia 05/26/2022    Retention of urine, unspecified 08/09/2019    Spinal cord injury at T1-T6 level 04/20/2018       Past Surgical History:   Procedure Laterality Date    ANGIOGRAM, RENAL ARTERIES, BILATERAL N/A 10/17/2024    Procedure: Angiogram, Renal Arteries, Bilateral;  Surgeon: Pati King MD;  Location: [facility] CATH LAB;  Service: Peripheral Vascular;  Laterality: N/A;  left renal artery coiling    APPLICATION OF WOUND VACUUM-ASSISTED CLOSURE DEVICE N/A 10/10/2024    Procedure: APPLICATION, WOUND VAC;  Surgeon: Rob Sinclair MD;  Location: [facility] OR;  Service: General;  Laterality: N/A;    EGD, WITH CLOSED BIOPSY N/A 8/29/2024    Procedure: EGD, WITH CLOSED BIOPSY;  Surgeon: Mikal Segovia MD;  Location: [facility] ENDOSCOPY;  Service: Gastroenterology;  Laterality: N/A;    ESOPHAGOGASTRODUODENOSCOPY N/A 8/29/2024    Procedure: EGD;  Surgeon: Mikal Segovia MD;  Location: [facility] ENDOSCOPY;  Service: Gastroenterology;  Laterality:  N/A;    FRACTURE SURGERY      INCISION AND DRAINAGE, LOWER EXTREMITY Right 2023    Procedure: INCISION AND DRAINAGE, LOWER EXTREMITY;  Surgeon: Prabhu Shen DO;  Location: [facility] OR;  Service: Orthopedics;  Laterality: Right;  supine bone foam wash stuff cultures    INCISION AND DRAINAGE, LOWER EXTREMITY Right 2023    Procedure: INCISION AND DRAINAGE, LOWER EXTREMITY;  Surgeon: Prabhu Shen DO;  Location: [facility] OR;  Service: Orthopedics;  Laterality: Right;  supine any table bone foam wash stuff possible wound vac    INCISION AND DRAINAGE, LOWER EXTREMITY Right 2023    Procedure: INCISION AND DRAINAGE, LOWER EXTREMITY;  Surgeon: Prabhu Shen DO;  Location: [facility] OR;  Service: Orthopedics;  Laterality: Right;  supine bone foam vascular bed removal of distal femoral plate, wash stuff, possible AKA    INSERTION OF INTRAMEDULLARY MARISA Right 08/10/2022    Procedure: INSERTION, INTRAMEDULLARY MARISA RIGHT TIBIA;  Surgeon: Jorge Orellana MD;  Location: [facility] OR;  Service: Orthopedics;  Laterality: Right;    JOINT REPLACEMENT      Ankel    PRESSURE ULCER DEBRIDEMENT N/A 10/10/2024    Procedure: DEBRIDEMENT, PRESSURE ULCER;  Surgeon: Rob Sinclair MD;  Location: [facility] OR;  Service: General;  Laterality: N/A;  sacral wound, R buttock wound    REMOVAL OF HARDWARE FROM LOWER EXTREMITY Right 2023    Procedure: REMOVAL, HARDWARE, LOWER EXTREMITY;  Surgeon: Prabhu Shen DO;  Location: [facility] OR;  Service: Orthopedics;  Laterality: Right;    SPINE SURGERY  2018       Social History     Socioeconomic History    Marital status:    Tobacco Use    Smoking status: Some Days     Current packs/day: 0.00     Average packs/day: 0.2 packs/day for 41.5 years (10.4 ttl pk-yrs)     Types: Cigars, Cigarettes     Start date: 1982     Last attempt to quit: 2023     Years since quittin.2    Smokeless tobacco: Never   Substance and Sexual Activity    Alcohol use: Not Currently      Alcohol/week: 2.0 standard drinks of alcohol     Types: 2 Cans of beer per week    Drug use: Not Currently     Types: Oxycodone    Sexual activity: Not Currently     Partners: Female     Birth control/protection: None     Social Drivers of Health     Financial Resource Strain: Low Risk  (10/10/2024)    Overall Financial Resource Strain (CARDIA)     Difficulty of Paying Living Expenses: Not hard at all   Food Insecurity: No Food Insecurity (10/10/2024)    Hunger Vital Sign     Worried About Running Out of Food in the Last Year: Never true     Ran Out of Food in the Last Year: Never true   Transportation Needs: No Transportation Needs (10/10/2024)    TRANSPORTATION NEEDS     Transportation : No   Physical Activity: Inactive (12/11/2023)    Exercise Vital Sign     Days of Exercise per Week: 0 days     Minutes of Exercise per Session: 0 min   Stress: No Stress Concern Present (10/10/2024)    Stateless [place] [employer] - Occupational Stress Questionnaire     Feeling of Stress : Only a little   Housing Stability: Low Risk  (10/10/2024)    Housing Stability Vital Sign     Unable to Pay for Housing in the Last Year: No     Homeless in the Last Year: No       Scheduled Meds:   ampicillin-sulbactam  3 g Intravenous Q6H    atorvastatin  20 mg Per NG tube Nightly    carvediloL  25 mg Oral BID    ciprofloxacin  400 mg Intravenous Q12H    doxycycline  100 mg Oral Q12H    fenofibrate  48 mg Per NG tube Daily    FLUoxetine  20 mg Per G Tube Daily    gabapentin  400 mg Per NG tube Q8H    lactulose 10 gram/15 ml  15 g Oral TID    megestroL  200 mg Oral Daily    NIFEdipine  60 mg Oral Daily    oxybutynin  5 mg Per NG tube TID    pantoprazole  40 mg Intravenous Daily    senna-docusate 8.6-50 mg  1 tablet Oral BID    sodium chloride 0.9%  10 mL Intravenous Q6H    tobramycin IV (PEDS and ADULTS)  7 mg/kg (Ideal) Intravenous Q36H     Continuous Infusions:   heparin (porcine) in D5W  0-40 Units/kg/hr (Adjusted) Intravenous  "Continuous           PRN Meds:  Current Facility-Administered Medications:     0.9%  NaCl infusion (for blood administration), , Intravenous, Q24H PRN    acetaminophen, 650 mg, Oral, Q4H PRN    albuterol-ipratropium, 3 mL, Nebulization, Q4H PRN    aluminum-magnesium hydroxide-simethicone, 30 mL, Oral, QID PRN    dextrose 10%, 12.5 g, Intravenous, PRN    dextrose 10%, 25 g, Intravenous, PRN    diphenhydrAMINE, 50 mg, Intravenous, Q6H PRN    etomidate, , Intravenous, Code/trauma/sedation Med    glucagon (human recombinant), 1 mg, Intramuscular, PRN    glucose, 16 g, Oral, PRN    glucose, 24 g, Oral, PRN    guaiFENesin 100 mg/5 ml, 200 mg, Oral, Q4H PRN    heparin (PORCINE), 60 Units/kg (Adjusted), Intravenous, PRN    heparin (PORCINE), 30 Units/kg (Adjusted), Intravenous, PRN    hydrALAZINE, 20 mg, Intravenous, Q4H PRN    hyoscyamine, 0.125 mg, Sublingual, Q4H PRN    insulin aspart U-100, 1-10 Units, Subcutaneous, QID (AC + HS) PRN    methocarbamoL, 750 mg, Oral, TID PRN    morphine, 2 mg, Intravenous, BID PRN    ondansetron, 4 mg, Intravenous, Q4H PRN    oxyCODONE-acetaminophen, 1 tablet, Oral, Q4H PRN    polyethylene glycol, 17 g, Oral, BID PRN    prochlorperazine, 5 mg, Intravenous, Q6H PRN    rocuronium, , Intravenous, Code/trauma/sedation Med    sodium chloride 0.9%, 10 mL, Intravenous, PRN    Flushing PICC/Midline Protocol, , , Until Discontinued **AND** sodium chloride 0.9%, 10 mL, Intravenous, Q6H **AND** sodium chloride 0.9%, 10 mL, Intravenous, PRN    tobramycin - pharmacy to dose, , Intravenous, pharmacy to manage frequency    traZODone, 200 mg, Oral, Nightly PRN    Objective:  BP (!) 146/79   Pulse 82   Temp 97.6 °F (36.4 °C) (Oral)   Resp 18   Ht 5' 10" (1.778 m)   Wt 79.3 kg (174 lb 13.2 oz)   SpO2 98%   BMI 25.08 kg/m²     Physical Exam:   Physical Exam  Vitals reviewed.   HENT:      Head: Normocephalic.   Cardiovascular:      Rate and Rhythm: Normal rate and regular rhythm.   Pulmonary:      " Effort: Pulmonary effort is normal. No respiratory distress.      Breath sounds: Normal breath sounds. No wheezing.      Comments: Currently on 1L NC  Abdominal:      General: Bowel sounds are normal. There is no distension.      Palpations: Abdomen is soft.      Tenderness: There is no abdominal tenderness.   Genitourinary:     Comments: Suprapubic catheter  Musculoskeletal:      Cervical back: Normal range of motion.      Comments: Paraplegia   Skin:     Findings: No rash.      Comments: Wounds dressed. R chest tunneled central line   Neurological:      Mental Status: He is alert and oriented to person, place, and time.   Psychiatric:         Behavior: Behavior normal.     Imaging    Lab Review   Recent Results (from the past 24 hours)   POCT glucose    Collection Time: 11/04/24 11:20 AM   Result Value Ref Range    POCT Glucose 90 70 - 110 mg/dL   APTT    Collection Time: 11/04/24  1:07 PM   Result Value Ref Range    PTT 50.7 (H) 23.2 - 33.7 seconds   Protime-INR    Collection Time: 11/04/24  1:07 PM   Result Value Ref Range    PT 17.7 (H) 12.5 - 14.5 seconds    INR 1.5 (H) <=1.3   CBC with Differential    Collection Time: 11/04/24  1:07 PM   Result Value Ref Range    WBC 9.49 4.50 - 11.50 x10(3)/mcL    RBC 3.58 (L) 4.70 - 6.10 x10(6)/mcL    Hgb 9.8 (L) 14.0 - 18.0 g/dL    Hct 31.6 (L) 42.0 - 52.0 %    MCV 88.3 80.0 - 94.0 fL    MCH 27.4 27.0 - 31.0 pg    MCHC 31.0 (L) 33.0 - 36.0 g/dL    RDW 16.0 11.5 - 17.0 %    Platelet 559 (H) 130 - 400 x10(3)/mcL    MPV 9.0 7.4 - 10.4 fL    Neut % 69.1 %    Lymph % 20.3 %    Mono % 9.7 %    Eos % 0.1 %    Basophil % 0.1 %    Lymph # 1.93 0.6 - 4.6 x10(3)/mcL    Neut # 6.55 2.1 - 9.2 x10(3)/mcL    Mono # 0.92 0.1 - 1.3 x10(3)/mcL    Eos # 0.01 0 - 0.9 x10(3)/mcL    Baso # 0.01 <=0.2 x10(3)/mcL    IG# 0.07 (H) 0 - 0.04 x10(3)/mcL    IG% 0.7 %    NRBC% 0.0 %       Assessment/Plan:  Sepsis - resolved  Sacral wound infection - CR Acinetobacter / ESBL Ecoli / Enterococcus /  Bacteroides / Peptoniphilus isolates  Pneumonitis / Pleural effusions  MDR Pseudomonas (+) sputum  ANTON  Pyelonephritis per CT   Constipation  Paraplegia  Neurogenic bladder with suprapubic catheter  DM Type II  MRSA L ankle osteomyelitis with retained hardware  Anemia    -Continue Unasyn #22, Cipro #22 and D/C Tobramycin #8  -Has been unable to get MRI R hip due to clinical status. Will extend antibiotics course another 2 weeks with end date 11/11  -Remains afebrile without leukocytosis, follow  -Currently on 1L NC, wean as tolerated  -Repeat CXR on 10/26 with pulmonary edema, bilateral effusions, lower lobe atelectasis unchanged.   -CXR on 10/21 with B/L pleural effusions with associated atelectasis and/or infiltrate  -Sputum culture on 10/23 with moderate Pseudomonas species, follow  -Pt with significant anemia, CT abdomen/pelvis showed L renal rupture with large subcapsular hematoma  -Seen by Vascular, inputs noted. S/P aortogram, left renal angiography with coil embolization of left renal artery on 10/17  -Received multiple units of blood/blood products during his stay  -CT pelvis with contrast showed worsening inflammatory change of sacral ulcer with gas inferior to the R pubic ramus  -S/P debridement of sacral wound on 10/10  -Sacral wound cultures revealing CR Acinetobacter, ESBL Ecoli, Enterococcus, Bacteroides, Peptoniphilus isolates  -Continue wound care  -Surgery re-consulted for diverting colostomy, follow recs  -Blood cultures negative   -Discussed with patient and nursing staff. Pt is now a DNR. CM working on transfer to TCU noted
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
## 2024-11-06 ENCOUNTER — ANESTHESIA (OUTPATIENT)
Dept: SURGERY | Facility: HOSPITAL | Age: 59
DRG: 853 | End: 2024-11-06
Payer: MEDICARE

## 2024-11-06 LAB
ALBUMIN SERPL-MCNC: 2.6 G/DL (ref 3.4–4.8)
ALBUMIN/GLOB SERPL: 0.7 RATIO (ref 1.1–2)
ALP SERPL-CCNC: 65 UNIT/L (ref 40–150)
ALT SERPL-CCNC: 8 UNIT/L (ref 0–55)
ANION GAP SERPL CALC-SCNC: 11 MEQ/L
ANION GAP SERPL CALC-SCNC: 11 MEQ/L
APTT PPP: 48.9 SECONDS (ref 23.2–33.7)
APTT PPP: 49.7 SECONDS (ref 23.2–33.7)
AST SERPL-CCNC: 20 UNIT/L (ref 5–34)
BASOPHILS # BLD AUTO: 0.02 X10(3)/MCL
BASOPHILS # BLD AUTO: 0.02 X10(3)/MCL
BASOPHILS NFR BLD AUTO: 0.2 %
BASOPHILS NFR BLD AUTO: 0.3 %
BILIRUB SERPL-MCNC: 1.1 MG/DL
BUN SERPL-MCNC: 15.7 MG/DL (ref 8.4–25.7)
BUN SERPL-MCNC: 17.8 MG/DL (ref 8.4–25.7)
CALCIUM SERPL-MCNC: 8.8 MG/DL (ref 8.8–10)
CALCIUM SERPL-MCNC: 8.9 MG/DL (ref 8.8–10)
CHLORIDE SERPL-SCNC: 103 MMOL/L (ref 98–107)
CHLORIDE SERPL-SCNC: 104 MMOL/L (ref 98–107)
CO2 SERPL-SCNC: 22 MMOL/L (ref 23–31)
CO2 SERPL-SCNC: 22 MMOL/L (ref 23–31)
CREAT SERPL-MCNC: 1.36 MG/DL (ref 0.72–1.25)
CREAT SERPL-MCNC: 1.47 MG/DL (ref 0.72–1.25)
CREAT/UREA NIT SERPL: 12
CREAT/UREA NIT SERPL: 12
CRP SERPL-MCNC: 151 MG/L
EOSINOPHIL # BLD AUTO: 0.03 X10(3)/MCL (ref 0–0.9)
EOSINOPHIL # BLD AUTO: 0.06 X10(3)/MCL (ref 0–0.9)
EOSINOPHIL NFR BLD AUTO: 0.3 %
EOSINOPHIL NFR BLD AUTO: 0.8 %
ERYTHROCYTE [DISTWIDTH] IN BLOOD BY AUTOMATED COUNT: 16.2 % (ref 11.5–17)
ERYTHROCYTE [DISTWIDTH] IN BLOOD BY AUTOMATED COUNT: 16.2 % (ref 11.5–17)
ERYTHROCYTE [SEDIMENTATION RATE] IN BLOOD: 59 MM/HR (ref 0–15)
GFR SERPLBLD CREATININE-BSD FMLA CKD-EPI: 54 ML/MIN/1.73/M2
GFR SERPLBLD CREATININE-BSD FMLA CKD-EPI: 60 ML/MIN/1.73/M2
GLOBULIN SER-MCNC: 3.9 GM/DL (ref 2.4–3.5)
GLUCOSE SERPL-MCNC: 78 MG/DL (ref 82–115)
GLUCOSE SERPL-MCNC: 83 MG/DL (ref 82–115)
HCT VFR BLD AUTO: 24.5 % (ref 42–52)
HCT VFR BLD AUTO: 27.3 % (ref 42–52)
HGB BLD-MCNC: 7.8 G/DL (ref 14–18)
HGB BLD-MCNC: 8.5 G/DL (ref 14–18)
IMM GRANULOCYTES # BLD AUTO: 0.03 X10(3)/MCL (ref 0–0.04)
IMM GRANULOCYTES # BLD AUTO: 0.05 X10(3)/MCL (ref 0–0.04)
IMM GRANULOCYTES NFR BLD AUTO: 0.4 %
IMM GRANULOCYTES NFR BLD AUTO: 0.5 %
INR PPP: 1.4
LYMPHOCYTES # BLD AUTO: 1.55 X10(3)/MCL (ref 0.6–4.6)
LYMPHOCYTES # BLD AUTO: 1.61 X10(3)/MCL (ref 0.6–4.6)
LYMPHOCYTES NFR BLD AUTO: 16.7 %
LYMPHOCYTES NFR BLD AUTO: 22.2 %
MAGNESIUM SERPL-MCNC: 2 MG/DL (ref 1.6–2.6)
MCH RBC QN AUTO: 27.1 PG (ref 27–31)
MCH RBC QN AUTO: 27.2 PG (ref 27–31)
MCHC RBC AUTO-ENTMCNC: 31.1 G/DL (ref 33–36)
MCHC RBC AUTO-ENTMCNC: 31.8 G/DL (ref 33–36)
MCV RBC AUTO: 85.1 FL (ref 80–94)
MCV RBC AUTO: 87.5 FL (ref 80–94)
MONOCYTES # BLD AUTO: 0.6 X10(3)/MCL (ref 0.1–1.3)
MONOCYTES # BLD AUTO: 0.78 X10(3)/MCL (ref 0.1–1.3)
MONOCYTES NFR BLD AUTO: 10.8 %
MONOCYTES NFR BLD AUTO: 6.5 %
NEUTROPHILS # BLD AUTO: 4.74 X10(3)/MCL (ref 2.1–9.2)
NEUTROPHILS # BLD AUTO: 7.05 X10(3)/MCL (ref 2.1–9.2)
NEUTROPHILS NFR BLD AUTO: 65.5 %
NEUTROPHILS NFR BLD AUTO: 75.8 %
NRBC BLD AUTO-RTO: 0 %
NRBC BLD AUTO-RTO: 0 %
PHOSPHATE SERPL-MCNC: 3.7 MG/DL (ref 2.3–4.7)
PLATELET # BLD AUTO: 523 X10(3)/MCL (ref 130–400)
PLATELET # BLD AUTO: 524 X10(3)/MCL (ref 130–400)
PMV BLD AUTO: 8.9 FL (ref 7.4–10.4)
PMV BLD AUTO: 9.3 FL (ref 7.4–10.4)
POCT GLUCOSE: 78 MG/DL (ref 70–110)
POCT GLUCOSE: 98 MG/DL (ref 70–110)
POTASSIUM SERPL-SCNC: 4 MMOL/L (ref 3.5–5.1)
POTASSIUM SERPL-SCNC: 4.1 MMOL/L (ref 3.5–5.1)
PROT SERPL-MCNC: 6.5 GM/DL (ref 5.8–7.6)
PROTHROMBIN TIME: 17.2 SECONDS (ref 12.5–14.5)
RBC # BLD AUTO: 2.88 X10(6)/MCL (ref 4.7–6.1)
RBC # BLD AUTO: 3.12 X10(6)/MCL (ref 4.7–6.1)
SODIUM SERPL-SCNC: 136 MMOL/L (ref 136–145)
SODIUM SERPL-SCNC: 137 MMOL/L (ref 136–145)
WBC # BLD AUTO: 7.24 X10(3)/MCL (ref 4.5–11.5)
WBC # BLD AUTO: 9.3 X10(3)/MCL (ref 4.5–11.5)

## 2024-11-06 PROCEDURE — 63600175 PHARM REV CODE 636 W HCPCS

## 2024-11-06 PROCEDURE — 80048 BASIC METABOLIC PNL TOTAL CA: CPT

## 2024-11-06 PROCEDURE — 86140 C-REACTIVE PROTEIN: CPT | Performed by: NURSE PRACTITIONER

## 2024-11-06 PROCEDURE — 25000003 PHARM REV CODE 250

## 2024-11-06 PROCEDURE — 27201423 OPTIME MED/SURG SUP & DEVICES STERILE SUPPLY: Performed by: STUDENT IN AN ORGANIZED HEALTH CARE EDUCATION/TRAINING PROGRAM

## 2024-11-06 PROCEDURE — 36415 COLL VENOUS BLD VENIPUNCTURE: CPT

## 2024-11-06 PROCEDURE — 0DJD4ZZ INSPECTION OF LOWER INTESTINAL TRACT, PERCUTANEOUS ENDOSCOPIC APPROACH: ICD-10-PCS | Performed by: STUDENT IN AN ORGANIZED HEALTH CARE EDUCATION/TRAINING PROGRAM

## 2024-11-06 PROCEDURE — 25000003 PHARM REV CODE 250: Performed by: INTERNAL MEDICINE

## 2024-11-06 PROCEDURE — 85730 THROMBOPLASTIN TIME PARTIAL: CPT

## 2024-11-06 PROCEDURE — 63600175 PHARM REV CODE 636 W HCPCS: Performed by: ANESTHESIOLOGY

## 2024-11-06 PROCEDURE — 25000003 PHARM REV CODE 250: Performed by: STUDENT IN AN ORGANIZED HEALTH CARE EDUCATION/TRAINING PROGRAM

## 2024-11-06 PROCEDURE — 63600175 PHARM REV CODE 636 W HCPCS: Performed by: INTERNAL MEDICINE

## 2024-11-06 PROCEDURE — 36000711: Performed by: STUDENT IN AN ORGANIZED HEALTH CARE EDUCATION/TRAINING PROGRAM

## 2024-11-06 PROCEDURE — 85025 COMPLETE CBC W/AUTO DIFF WBC: CPT

## 2024-11-06 PROCEDURE — 25000003 PHARM REV CODE 250: Performed by: NURSE PRACTITIONER

## 2024-11-06 PROCEDURE — 63600175 PHARM REV CODE 636 W HCPCS: Performed by: NURSE PRACTITIONER

## 2024-11-06 PROCEDURE — 63600175 PHARM REV CODE 636 W HCPCS: Performed by: STUDENT IN AN ORGANIZED HEALTH CARE EDUCATION/TRAINING PROGRAM

## 2024-11-06 PROCEDURE — 37000008 HC ANESTHESIA 1ST 15 MINUTES: Performed by: STUDENT IN AN ORGANIZED HEALTH CARE EDUCATION/TRAINING PROGRAM

## 2024-11-06 PROCEDURE — 21400001 HC TELEMETRY ROOM

## 2024-11-06 PROCEDURE — 27000207 HC ISOLATION

## 2024-11-06 PROCEDURE — 0D1M0Z4 BYPASS DESCENDING COLON TO CUTANEOUS, OPEN APPROACH: ICD-10-PCS | Performed by: STUDENT IN AN ORGANIZED HEALTH CARE EDUCATION/TRAINING PROGRAM

## 2024-11-06 PROCEDURE — D9220A PRA ANESTHESIA: Mod: ,,,

## 2024-11-06 PROCEDURE — 37000009 HC ANESTHESIA EA ADD 15 MINS: Performed by: STUDENT IN AN ORGANIZED HEALTH CARE EDUCATION/TRAINING PROGRAM

## 2024-11-06 PROCEDURE — 71000033 HC RECOVERY, INTIAL HOUR: Performed by: STUDENT IN AN ORGANIZED HEALTH CARE EDUCATION/TRAINING PROGRAM

## 2024-11-06 PROCEDURE — A4216 STERILE WATER/SALINE, 10 ML: HCPCS | Performed by: STUDENT IN AN ORGANIZED HEALTH CARE EDUCATION/TRAINING PROGRAM

## 2024-11-06 PROCEDURE — 85610 PROTHROMBIN TIME: CPT

## 2024-11-06 PROCEDURE — 36000710: Performed by: STUDENT IN AN ORGANIZED HEALTH CARE EDUCATION/TRAINING PROGRAM

## 2024-11-06 PROCEDURE — P9047 ALBUMIN (HUMAN), 25%, 50ML: HCPCS | Mod: JZ,JG

## 2024-11-06 PROCEDURE — 85652 RBC SED RATE AUTOMATED: CPT | Performed by: NURSE PRACTITIONER

## 2024-11-06 PROCEDURE — 80053 COMPREHEN METABOLIC PANEL: CPT

## 2024-11-06 PROCEDURE — 83735 ASSAY OF MAGNESIUM: CPT

## 2024-11-06 PROCEDURE — 84100 ASSAY OF PHOSPHORUS: CPT

## 2024-11-06 RX ORDER — FLUOXETINE HYDROCHLORIDE 20 MG/1
20 CAPSULE ORAL DAILY
Status: DISCONTINUED | OUTPATIENT
Start: 2024-11-07 | End: 2024-11-26 | Stop reason: HOSPADM

## 2024-11-06 RX ORDER — CALCIUM CHLORIDE INJECTION 100 MG/ML
INJECTION, SOLUTION INTRAVENOUS
Status: DISCONTINUED | OUTPATIENT
Start: 2024-11-06 | End: 2024-11-06

## 2024-11-06 RX ORDER — ACETAMINOPHEN 10 MG/ML
INJECTION, SOLUTION INTRAVENOUS
Status: DISCONTINUED | OUTPATIENT
Start: 2024-11-06 | End: 2024-11-06

## 2024-11-06 RX ORDER — SODIUM CHLORIDE, SODIUM LACTATE, POTASSIUM CHLORIDE, CALCIUM CHLORIDE 600; 310; 30; 20 MG/100ML; MG/100ML; MG/100ML; MG/100ML
INJECTION, SOLUTION INTRAVENOUS CONTINUOUS
Status: DISCONTINUED | OUTPATIENT
Start: 2024-11-06 | End: 2024-11-09

## 2024-11-06 RX ORDER — ACETAMINOPHEN 10 MG/ML
1000 INJECTION, SOLUTION INTRAVENOUS ONCE
Status: ACTIVE | OUTPATIENT
Start: 2024-11-06 | End: 2024-11-07

## 2024-11-06 RX ORDER — ROCURONIUM BROMIDE 10 MG/ML
INJECTION, SOLUTION INTRAVENOUS
Status: DISCONTINUED | OUTPATIENT
Start: 2024-11-06 | End: 2024-11-06

## 2024-11-06 RX ORDER — SODIUM CHLORIDE, SODIUM GLUCONATE, SODIUM ACETATE, POTASSIUM CHLORIDE AND MAGNESIUM CHLORIDE 30; 37; 368; 526; 502 MG/100ML; MG/100ML; MG/100ML; MG/100ML; MG/100ML
INJECTION, SOLUTION INTRAVENOUS CONTINUOUS
Status: CANCELLED | OUTPATIENT
Start: 2024-11-06 | End: 2024-12-06

## 2024-11-06 RX ORDER — ALBUMIN HUMAN 250 G/1000ML
SOLUTION INTRAVENOUS
Status: DISCONTINUED | OUTPATIENT
Start: 2024-11-06 | End: 2024-11-06

## 2024-11-06 RX ORDER — GLUCAGON 1 MG
1 KIT INJECTION
Status: DISCONTINUED | OUTPATIENT
Start: 2024-11-06 | End: 2024-11-26 | Stop reason: HOSPADM

## 2024-11-06 RX ORDER — DEXMEDETOMIDINE HYDROCHLORIDE 100 UG/ML
INJECTION, SOLUTION INTRAVENOUS
Status: DISCONTINUED | OUTPATIENT
Start: 2024-11-06 | End: 2024-11-06

## 2024-11-06 RX ORDER — HYDROMORPHONE HYDROCHLORIDE 2 MG/ML
0.2 INJECTION, SOLUTION INTRAMUSCULAR; INTRAVENOUS; SUBCUTANEOUS EVERY 5 MIN PRN
Status: DISCONTINUED | OUTPATIENT
Start: 2024-11-06 | End: 2024-11-07

## 2024-11-06 RX ORDER — GABAPENTIN 400 MG/1
400 CAPSULE ORAL EVERY 8 HOURS
Status: DISCONTINUED | OUTPATIENT
Start: 2024-11-06 | End: 2024-11-26 | Stop reason: HOSPADM

## 2024-11-06 RX ORDER — ATORVASTATIN CALCIUM 10 MG/1
20 TABLET, FILM COATED ORAL NIGHTLY
Status: DISCONTINUED | OUTPATIENT
Start: 2024-11-06 | End: 2024-11-26 | Stop reason: HOSPADM

## 2024-11-06 RX ORDER — HYDROXYZINE HYDROCHLORIDE 25 MG/1
25 TABLET, FILM COATED ORAL 2 TIMES DAILY PRN
Status: DISCONTINUED | OUTPATIENT
Start: 2024-11-06 | End: 2024-11-26 | Stop reason: HOSPADM

## 2024-11-06 RX ORDER — PHENYLEPHRINE HYDROCHLORIDE 10 MG/ML
INJECTION INTRAVENOUS
Status: DISCONTINUED | OUTPATIENT
Start: 2024-11-06 | End: 2024-11-06

## 2024-11-06 RX ORDER — FENOFIBRATE 48 MG/1
48 TABLET, FILM COATED ORAL DAILY
Status: DISCONTINUED | OUTPATIENT
Start: 2024-11-07 | End: 2024-11-26 | Stop reason: HOSPADM

## 2024-11-06 RX ORDER — MIDAZOLAM HYDROCHLORIDE 2 MG/2ML
2 INJECTION, SOLUTION INTRAMUSCULAR; INTRAVENOUS ONCE AS NEEDED
Status: CANCELLED | OUTPATIENT
Start: 2024-11-06 | End: 2036-04-04

## 2024-11-06 RX ORDER — DIPHENHYDRAMINE HYDROCHLORIDE 50 MG/ML
25 INJECTION INTRAMUSCULAR; INTRAVENOUS EVERY 6 HOURS PRN
Status: DISCONTINUED | OUTPATIENT
Start: 2024-11-06 | End: 2024-11-07

## 2024-11-06 RX ORDER — FENTANYL CITRATE 50 UG/ML
INJECTION, SOLUTION INTRAMUSCULAR; INTRAVENOUS
Status: DISCONTINUED | OUTPATIENT
Start: 2024-11-06 | End: 2024-11-06

## 2024-11-06 RX ORDER — ONDANSETRON HYDROCHLORIDE 2 MG/ML
INJECTION, SOLUTION INTRAVENOUS
Status: DISCONTINUED | OUTPATIENT
Start: 2024-11-06 | End: 2024-11-06

## 2024-11-06 RX ORDER — LIDOCAINE HYDROCHLORIDE 10 MG/ML
1 INJECTION, SOLUTION EPIDURAL; INFILTRATION; INTRACAUDAL; PERINEURAL ONCE
Status: CANCELLED | OUTPATIENT
Start: 2024-11-06 | End: 2024-11-06

## 2024-11-06 RX ORDER — PROPOFOL 10 MG/ML
VIAL (ML) INTRAVENOUS
Status: DISCONTINUED | OUTPATIENT
Start: 2024-11-06 | End: 2024-11-06

## 2024-11-06 RX ORDER — LIDOCAINE HYDROCHLORIDE 20 MG/ML
INJECTION, SOLUTION EPIDURAL; INFILTRATION; INTRACAUDAL; PERINEURAL
Status: DISCONTINUED | OUTPATIENT
Start: 2024-11-06 | End: 2024-11-06

## 2024-11-06 RX ORDER — ONDANSETRON HYDROCHLORIDE 2 MG/ML
4 INJECTION, SOLUTION INTRAVENOUS DAILY PRN
Status: DISCONTINUED | OUTPATIENT
Start: 2024-11-06 | End: 2024-11-07

## 2024-11-06 RX ADMIN — OXYBUTYNIN CHLORIDE 5 MG: 5 TABLET ORAL at 11:11

## 2024-11-06 RX ADMIN — OXYBUTYNIN CHLORIDE 5 MG: 5 TABLET ORAL at 02:11

## 2024-11-06 RX ADMIN — SODIUM CHLORIDE, PRESERVATIVE FREE 10 ML: 5 INJECTION INTRAVENOUS at 05:11

## 2024-11-06 RX ADMIN — CARVEDILOL 25 MG: 12.5 TABLET, FILM COATED ORAL at 10:11

## 2024-11-06 RX ADMIN — AMPICILLIN SODIUM AND SULBACTAM SODIUM 3 G: 2; 1 INJECTION, POWDER, FOR SOLUTION INTRAMUSCULAR; INTRAVENOUS at 05:11

## 2024-11-06 RX ADMIN — FENTANYL CITRATE 50 MCG: 50 INJECTION, SOLUTION INTRAMUSCULAR; INTRAVENOUS at 07:11

## 2024-11-06 RX ADMIN — ONDANSETRON 4 MG: 2 INJECTION INTRAMUSCULAR; INTRAVENOUS at 11:11

## 2024-11-06 RX ADMIN — ROCURONIUM BROMIDE 30 MG: 10 SOLUTION INTRAVENOUS at 08:11

## 2024-11-06 RX ADMIN — AMPICILLIN SODIUM AND SULBACTAM SODIUM 3 G: 2; 1 INJECTION, POWDER, FOR SOLUTION INTRAMUSCULAR; INTRAVENOUS at 11:11

## 2024-11-06 RX ADMIN — GABAPENTIN 400 MG: 400 CAPSULE ORAL at 10:11

## 2024-11-06 RX ADMIN — ATORVASTATIN CALCIUM 20 MG: 10 TABLET, FILM COATED ORAL at 10:11

## 2024-11-06 RX ADMIN — GABAPENTIN 400 MG: 300 CAPSULE ORAL at 02:11

## 2024-11-06 RX ADMIN — PHENYLEPHRINE HYDROCHLORIDE 200 MCG: 10 INJECTION INTRAVENOUS at 09:11

## 2024-11-06 RX ADMIN — ROCURONIUM BROMIDE 50 MG: 10 SOLUTION INTRAVENOUS at 07:11

## 2024-11-06 RX ADMIN — FENOFIBRATE 48 MG: 48 TABLET, FILM COATED ORAL at 11:11

## 2024-11-06 RX ADMIN — OXYCODONE AND ACETAMINOPHEN 1 TABLET: 10; 325 TABLET ORAL at 05:11

## 2024-11-06 RX ADMIN — CARVEDILOL 25 MG: 12.5 TABLET, FILM COATED ORAL at 11:11

## 2024-11-06 RX ADMIN — ALBUMIN (HUMAN) 100 ML: 12.5 SOLUTION INTRAVENOUS at 08:11

## 2024-11-06 RX ADMIN — NIFEDIPINE 60 MG: 60 TABLET, FILM COATED, EXTENDED RELEASE ORAL at 11:11

## 2024-11-06 RX ADMIN — FLUOXETINE HYDROCHLORIDE 20 MG: 20 CAPSULE ORAL at 11:11

## 2024-11-06 RX ADMIN — SODIUM CHLORIDE, POTASSIUM CHLORIDE, SODIUM LACTATE AND CALCIUM CHLORIDE: 600; 310; 30; 20 INJECTION, SOLUTION INTRAVENOUS at 06:11

## 2024-11-06 RX ADMIN — HEPARIN SODIUM 14 UNITS/KG/HR: 10000 INJECTION, SOLUTION INTRAVENOUS at 04:11

## 2024-11-06 RX ADMIN — GABAPENTIN 400 MG: 300 CAPSULE ORAL at 05:11

## 2024-11-06 RX ADMIN — DEXMEDETOMIDINE 10 MCG: 200 INJECTION, SOLUTION INTRAVENOUS at 08:11

## 2024-11-06 RX ADMIN — DEXMEDETOMIDINE 5 MCG: 200 INJECTION, SOLUTION INTRAVENOUS at 09:11

## 2024-11-06 RX ADMIN — FENTANYL CITRATE 50 MCG: 50 INJECTION, SOLUTION INTRAMUSCULAR; INTRAVENOUS at 08:11

## 2024-11-06 RX ADMIN — SODIUM CHLORIDE, PRESERVATIVE FREE 10 ML: 5 INJECTION INTRAVENOUS at 11:11

## 2024-11-06 RX ADMIN — ONDANSETRON 4 MG: 2 INJECTION INTRAMUSCULAR; INTRAVENOUS at 10:11

## 2024-11-06 RX ADMIN — CIPROFLOXACIN 400 MG: 2 INJECTION, SOLUTION INTRAVENOUS at 09:11

## 2024-11-06 RX ADMIN — PHENYLEPHRINE HYDROCHLORIDE 100 MCG: 10 INJECTION INTRAVENOUS at 09:11

## 2024-11-06 RX ADMIN — ACETAMINOPHEN 650 MG: 325 TABLET, FILM COATED ORAL at 05:11

## 2024-11-06 RX ADMIN — CALCIUM CHLORIDE INJECTION 0.25 G: 100 INJECTION, SOLUTION INTRAVENOUS at 09:11

## 2024-11-06 RX ADMIN — ONDANSETRON 4 MG: 2 INJECTION INTRAMUSCULAR; INTRAVENOUS at 03:11

## 2024-11-06 RX ADMIN — ACETAMINOPHEN 1000 MG: 10 INJECTION, SOLUTION INTRAVENOUS at 09:11

## 2024-11-06 RX ADMIN — DOXYCYCLINE HYCLATE 100 MG: 100 TABLET, COATED ORAL at 11:11

## 2024-11-06 RX ADMIN — HYDRALAZINE HYDROCHLORIDE 20 MG: 20 INJECTION INTRAMUSCULAR; INTRAVENOUS at 05:11

## 2024-11-06 RX ADMIN — PANTOPRAZOLE SODIUM 40 MG: 40 INJECTION, POWDER, LYOPHILIZED, FOR SOLUTION INTRAVENOUS at 11:11

## 2024-11-06 RX ADMIN — SODIUM CHLORIDE, PRESERVATIVE FREE 10 ML: 5 INJECTION INTRAVENOUS at 04:11

## 2024-11-06 RX ADMIN — DOXYCYCLINE HYCLATE 100 MG: 100 TABLET, COATED ORAL at 10:11

## 2024-11-06 RX ADMIN — PHENYLEPHRINE HYDROCHLORIDE 300 MCG: 10 INJECTION INTRAVENOUS at 09:11

## 2024-11-06 RX ADMIN — HYDRALAZINE HYDROCHLORIDE 20 MG: 20 INJECTION INTRAMUSCULAR; INTRAVENOUS at 10:11

## 2024-11-06 RX ADMIN — LIDOCAINE HYDROCHLORIDE 80 MG: 20 INJECTION, SOLUTION INTRAVENOUS at 07:11

## 2024-11-06 RX ADMIN — PROPOFOL 70 MG: 10 INJECTION, EMULSION INTRAVENOUS at 07:11

## 2024-11-06 RX ADMIN — AMPICILLIN SODIUM AND SULBACTAM SODIUM 3 G: 2; 1 INJECTION, POWDER, FOR SOLUTION INTRAMUSCULAR; INTRAVENOUS at 04:11

## 2024-11-06 RX ADMIN — SUGAMMADEX 200 MG: 100 INJECTION, SOLUTION INTRAVENOUS at 10:11

## 2024-11-06 RX ADMIN — OXYBUTYNIN CHLORIDE 5 MG: 5 TABLET ORAL at 10:11

## 2024-11-06 RX ADMIN — ACETAMINOPHEN 650 MG: 325 TABLET, FILM COATED ORAL at 11:11

## 2024-11-06 RX ADMIN — CIPROFLOXACIN 400 MG: 2 INJECTION, SOLUTION INTRAVENOUS at 10:11

## 2024-11-06 RX ADMIN — SODIUM CHLORIDE: 9 INJECTION, SOLUTION INTRAVENOUS at 07:11

## 2024-11-06 NOTE — BRIEF OP NOTE
[facility] - 8th Floor Med Surg  Brief Operative Note    SUMMARY     Surgery Date: 11/6/2024     Surgeons and Role:     * Rob Sinclair MD - Primary     * Yaya Oconnell MD - Resident - Assisting     * Anita Morse MD - Resident - Assisting    Pre-op Diagnosis:  Sacral wound [S31.000A]    Post-op Diagnosis:  Post-Op Diagnosis Codes:     * Sacral wound [S31.000A]    Procedure(s) (LRB):  CREATION, COLOSTOMY, LAPAROSCOPIC CONVERTED  TO OPEN (N/A)    Anesthesia: General    Implants:  * No implants in log *    Operative Findings: Creation of loop colostomy in LUQ, Laparoscopic approach converted to open 2/2 dense scarring of intestines to abdominal wall and retroperitoneum limiting mobilization of the small intestines     Estimated Blood Loss:     Estimated Blood Loss has not been documented. EBL = 20mL .         Specimens:   Specimen (24h ago, onward)      None          [id number]    Anita Morse MD   [facility] [facility]
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
## 2024-11-06 NOTE — ANESTHESIA PROCEDURE NOTES
Intubation    Date/Time: 11/6/2024 7:54 AM    Performed by: John Paul Hsu CRNA  Authorized by: John Paul Hsu CRNA    Intubation:     Induction:  Intravenous    Intubated:  Postinduction    Mask Ventilation:  Easy mask    Attempts:  1    Attempted By:  Other (see comments) (ENT resident)    Method of Intubation:  Direct    Blade:  Rice 2    Laryngeal View Grade: Grade I - full view of cords      Difficult Airway Encountered?: No      Complications:  None    Airway Device:  Oral endotracheal tube    Airway Device Size:  7.5    Style/Cuff Inflation:  Cuffed (inflated to minimal occlusive pressure)    Tube secured:  23    Secured at:  The lips    Placement Verified By:  Capnometry    Complicating Factors:  None    Findings Post-Intubation:  BS equal bilateral and atraumatic/condition of teeth unchanged
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
## 2024-11-06 NOTE — TRANSFER OF CARE
"Anesthesia Transfer of Care Note    Patient: Bianca Khan    Procedure(s) Performed: Procedure(s) (LRB):  CREATION, COLOSTOMY, LAPAROSCOPIC CONVERTED  TO OPEN (N/A)    Patient location: PACU    Anesthesia Type: general    Transport from OR: Transported from OR on room air with adequate spontaneous ventilation    Post pain: adequate analgesia    Post assessment: no apparent anesthetic complications    Post vital signs: stable    Level of consciousness: awake and alert    Nausea/Vomiting: no nausea/vomiting    Complications: none    Transfer of care protocol was followed      Last vitals: Visit Vitals  BP (!) 184/84   Pulse 86   Temp 36.6 °C (97.9 °F) (Oral)   Resp 14   Ht 5' 10" (1.778 m)   Wt 79.3 kg (174 lb 13.2 oz)   SpO2 97%   BMI 25.08 kg/m²     "

## 2024-11-07 LAB
ANION GAP SERPL CALC-SCNC: 7 MEQ/L
APTT PPP: 43.4 SECONDS (ref 23.2–33.7)
BASOPHILS # BLD AUTO: 0.02 X10(3)/MCL
BASOPHILS NFR BLD AUTO: 0.2 %
BUN SERPL-MCNC: 16.2 MG/DL (ref 8.4–25.7)
CALCIUM SERPL-MCNC: 8.5 MG/DL (ref 8.8–10)
CHLORIDE SERPL-SCNC: 102 MMOL/L (ref 98–107)
CO2 SERPL-SCNC: 24 MMOL/L (ref 23–31)
CREAT SERPL-MCNC: 1.51 MG/DL (ref 0.72–1.25)
CREAT/UREA NIT SERPL: 11
EOSINOPHIL # BLD AUTO: 0.01 X10(3)/MCL (ref 0–0.9)
EOSINOPHIL NFR BLD AUTO: 0.1 %
ERYTHROCYTE [DISTWIDTH] IN BLOOD BY AUTOMATED COUNT: 16.3 % (ref 11.5–17)
GFR SERPLBLD CREATININE-BSD FMLA CKD-EPI: 53 ML/MIN/1.73/M2
GLUCOSE SERPL-MCNC: 79 MG/DL (ref 82–115)
HCT VFR BLD AUTO: 23.9 % (ref 42–52)
HGB BLD-MCNC: 7.8 G/DL (ref 14–18)
IMM GRANULOCYTES # BLD AUTO: 0.05 X10(3)/MCL (ref 0–0.04)
IMM GRANULOCYTES NFR BLD AUTO: 0.6 %
LYMPHOCYTES # BLD AUTO: 1.57 X10(3)/MCL (ref 0.6–4.6)
LYMPHOCYTES NFR BLD AUTO: 18.6 %
MAGNESIUM SERPL-MCNC: 1.8 MG/DL (ref 1.6–2.6)
MCH RBC QN AUTO: 27.8 PG (ref 27–31)
MCHC RBC AUTO-ENTMCNC: 32.6 G/DL (ref 33–36)
MCV RBC AUTO: 85.1 FL (ref 80–94)
MONOCYTES # BLD AUTO: 0.62 X10(3)/MCL (ref 0.1–1.3)
MONOCYTES NFR BLD AUTO: 7.3 %
NEUTROPHILS # BLD AUTO: 6.17 X10(3)/MCL (ref 2.1–9.2)
NEUTROPHILS NFR BLD AUTO: 73.2 %
NRBC BLD AUTO-RTO: 0 %
PHOSPHATE SERPL-MCNC: 3.6 MG/DL (ref 2.3–4.7)
PLATELET # BLD AUTO: 492 X10(3)/MCL (ref 130–400)
PMV BLD AUTO: 8.9 FL (ref 7.4–10.4)
POCT GLUCOSE: 80 MG/DL (ref 70–110)
POTASSIUM SERPL-SCNC: 4.1 MMOL/L (ref 3.5–5.1)
RBC # BLD AUTO: 2.81 X10(6)/MCL (ref 4.7–6.1)
SODIUM SERPL-SCNC: 133 MMOL/L (ref 136–145)
WBC # BLD AUTO: 8.44 X10(3)/MCL (ref 4.5–11.5)

## 2024-11-07 PROCEDURE — 25000003 PHARM REV CODE 250: Performed by: NURSE PRACTITIONER

## 2024-11-07 PROCEDURE — 25000003 PHARM REV CODE 250: Performed by: STUDENT IN AN ORGANIZED HEALTH CARE EDUCATION/TRAINING PROGRAM

## 2024-11-07 PROCEDURE — 25000003 PHARM REV CODE 250: Performed by: INTERNAL MEDICINE

## 2024-11-07 PROCEDURE — 63600175 PHARM REV CODE 636 W HCPCS: Performed by: STUDENT IN AN ORGANIZED HEALTH CARE EDUCATION/TRAINING PROGRAM

## 2024-11-07 PROCEDURE — 63600175 PHARM REV CODE 636 W HCPCS: Performed by: NURSE PRACTITIONER

## 2024-11-07 PROCEDURE — 21400001 HC TELEMETRY ROOM

## 2024-11-07 PROCEDURE — 85730 THROMBOPLASTIN TIME PARTIAL: CPT

## 2024-11-07 PROCEDURE — 85025 COMPLETE CBC W/AUTO DIFF WBC: CPT

## 2024-11-07 PROCEDURE — 83735 ASSAY OF MAGNESIUM: CPT

## 2024-11-07 PROCEDURE — 84100 ASSAY OF PHOSPHORUS: CPT

## 2024-11-07 PROCEDURE — 25000003 PHARM REV CODE 250

## 2024-11-07 PROCEDURE — 80048 BASIC METABOLIC PNL TOTAL CA: CPT

## 2024-11-07 PROCEDURE — A4216 STERILE WATER/SALINE, 10 ML: HCPCS | Performed by: STUDENT IN AN ORGANIZED HEALTH CARE EDUCATION/TRAINING PROGRAM

## 2024-11-07 PROCEDURE — 27000207 HC ISOLATION

## 2024-11-07 PROCEDURE — 36415 COLL VENOUS BLD VENIPUNCTURE: CPT

## 2024-11-07 RX ADMIN — OXYBUTYNIN CHLORIDE 5 MG: 5 TABLET ORAL at 09:11

## 2024-11-07 RX ADMIN — MORPHINE SULFATE 2 MG: 4 INJECTION, SOLUTION INTRAMUSCULAR; INTRAVENOUS at 04:11

## 2024-11-07 RX ADMIN — AMPICILLIN SODIUM AND SULBACTAM SODIUM 3 G: 2; 1 INJECTION, POWDER, FOR SOLUTION INTRAMUSCULAR; INTRAVENOUS at 06:11

## 2024-11-07 RX ADMIN — AMPICILLIN SODIUM AND SULBACTAM SODIUM 3 G: 2; 1 INJECTION, POWDER, FOR SOLUTION INTRAMUSCULAR; INTRAVENOUS at 05:11

## 2024-11-07 RX ADMIN — ATORVASTATIN CALCIUM 20 MG: 10 TABLET, FILM COATED ORAL at 09:11

## 2024-11-07 RX ADMIN — OXYCODONE AND ACETAMINOPHEN 1 TABLET: 10; 325 TABLET ORAL at 09:11

## 2024-11-07 RX ADMIN — FLUOXETINE 20 MG: 20 CAPSULE ORAL at 08:11

## 2024-11-07 RX ADMIN — CIPROFLOXACIN 400 MG: 2 INJECTION, SOLUTION INTRAVENOUS at 08:11

## 2024-11-07 RX ADMIN — OXYBUTYNIN CHLORIDE 5 MG: 5 TABLET ORAL at 08:11

## 2024-11-07 RX ADMIN — OXYCODONE AND ACETAMINOPHEN 1 TABLET: 10; 325 TABLET ORAL at 08:11

## 2024-11-07 RX ADMIN — SODIUM CHLORIDE, PRESERVATIVE FREE 10 ML: 5 INJECTION INTRAVENOUS at 12:11

## 2024-11-07 RX ADMIN — GABAPENTIN 400 MG: 400 CAPSULE ORAL at 09:11

## 2024-11-07 RX ADMIN — CARVEDILOL 25 MG: 12.5 TABLET, FILM COATED ORAL at 09:11

## 2024-11-07 RX ADMIN — AMPICILLIN SODIUM AND SULBACTAM SODIUM 3 G: 2; 1 INJECTION, POWDER, FOR SOLUTION INTRAMUSCULAR; INTRAVENOUS at 11:11

## 2024-11-07 RX ADMIN — DOXYCYCLINE HYCLATE 100 MG: 100 TABLET, COATED ORAL at 09:11

## 2024-11-07 RX ADMIN — GABAPENTIN 400 MG: 400 CAPSULE ORAL at 06:11

## 2024-11-07 RX ADMIN — DOXYCYCLINE HYCLATE 100 MG: 100 TABLET, COATED ORAL at 08:11

## 2024-11-07 RX ADMIN — GABAPENTIN 400 MG: 400 CAPSULE ORAL at 12:11

## 2024-11-07 RX ADMIN — OXYCODONE AND ACETAMINOPHEN 1 TABLET: 10; 325 TABLET ORAL at 12:11

## 2024-11-07 RX ADMIN — SODIUM CHLORIDE, PRESERVATIVE FREE 10 ML: 5 INJECTION INTRAVENOUS at 04:11

## 2024-11-07 RX ADMIN — TRAZODONE HYDROCHLORIDE 200 MG: 100 TABLET ORAL at 11:11

## 2024-11-07 RX ADMIN — SODIUM CHLORIDE, PRESERVATIVE FREE 10 ML: 5 INJECTION INTRAVENOUS at 06:11

## 2024-11-07 RX ADMIN — FENOFIBRATE 48 MG: 48 TABLET, FILM COATED ORAL at 08:11

## 2024-11-07 RX ADMIN — CIPROFLOXACIN 400 MG: 2 INJECTION, SOLUTION INTRAVENOUS at 10:11

## 2024-11-07 RX ADMIN — OXYCODONE AND ACETAMINOPHEN 1 TABLET: 10; 325 TABLET ORAL at 05:11

## 2024-11-07 RX ADMIN — CARVEDILOL 25 MG: 12.5 TABLET, FILM COATED ORAL at 08:11

## 2024-11-07 RX ADMIN — AMPICILLIN SODIUM AND SULBACTAM SODIUM 3 G: 2; 1 INJECTION, POWDER, FOR SOLUTION INTRAMUSCULAR; INTRAVENOUS at 12:11

## 2024-11-07 RX ADMIN — OXYBUTYNIN CHLORIDE 5 MG: 5 TABLET ORAL at 04:11

## 2024-11-07 RX ADMIN — SODIUM CHLORIDE, PRESERVATIVE FREE 10 ML: 5 INJECTION INTRAVENOUS at 11:11

## 2024-11-07 RX ADMIN — PANTOPRAZOLE SODIUM 40 MG: 40 INJECTION, POWDER, LYOPHILIZED, FOR SOLUTION INTRAVENOUS at 08:11

## 2024-11-07 RX ADMIN — NIFEDIPINE 60 MG: 60 TABLET, FILM COATED, EXTENDED RELEASE ORAL at 08:11
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
## 2024-11-07 NOTE — ASSESSMENT & PLAN NOTE
Nutrition consulted. Most recent weight and BMI monitored-     Measurements:  Wt Readings from Last 1 Encounters:   10/17/24 79.3 kg (174 lb 13.2 oz)   Body mass index is 25.08 kg/m².    Patient has been screened and assessed by RD.    Malnutrition Type:  Context: acute illness or injury  Level: moderate    Malnutrition Characteristic Summary:  Weight Loss (Malnutrition):  (does not meet criteria)  Energy Intake (Malnutrition):  (family denies)  Subcutaneous Fat (Malnutrition): mild depletion  Muscle Mass (Malnutrition): mild depletion  Fluid Accumulation (Malnutrition): mild    Interventions/Recommendations (treatment strategy):
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
## 2024-11-07 NOTE — PLAN OF CARE
Problem: Adult Inpatient Plan of Care  Goal: Plan of Care Review  Outcome: Progressing  Flowsheets (Taken 11/7/2024 1148)  Plan of Care Reviewed With:   patient   sibling  Goal: Patient-Specific Goal (Individualized)  Outcome: Progressing  Flowsheets (Taken 11/7/2024 1148)  Individualized Care Needs: to go home soon  Anxieties, Fears or Concerns: lengthy stay  Patient/Family-Specific Goals (Include Timeframe): to go home soon  Goal: Absence of Hospital-Acquired Illness or Injury  Outcome: Progressing  Intervention: Identify and Manage Fall Risk  Flowsheets (Taken 11/7/2024 1148)  Safety Promotion/Fall Prevention:   assistive device/personal item within reach   medications reviewed  Intervention: Prevent Skin Injury  Flowsheets (Taken 11/7/2024 1148)  Body Position: turned  Skin Protection: incontinence pads utilized  Device Skin Pressure Protection: positioning supports utilized  Intervention: Prevent and Manage VTE (Venous Thromboembolism) Risk  Flowsheets (Taken 11/7/2024 1148)  VTE Prevention/Management: remove, assess skin, and reapply sequential compression device  Intervention: Prevent Infection  Flowsheets (Taken 11/7/2024 1148)  Infection Prevention: hand hygiene promoted  Goal: Optimal Comfort and Wellbeing  Outcome: Progressing  Goal: Readiness for Transition of Care  Outcome: Progressing

## 2024-11-07 NOTE — PROGRESS NOTES
[facility] - 8th Floor Med Surg  Wound Care    Patient Name:  Bianca Khan   MRN:  [id number]  Date: 2024  Diagnosis: Sacral wound    History:     Past Medical History:   Diagnosis Date    Arthritis     Chronic ulcer of ankle 2022    ESBL (extended spectrum beta-lactamase) producing bacteria infection 2024    Frequent UTI 2019    Generalized anxiety disorder 2022    Neurogenic bladder 2022    Osteomyelitis 2022    Paraplegia     Presence of suprapubic catheter 2022    Pure hypercholesterolemia 2022    Retention of urine, unspecified 2019    Spinal cord injury at T1-T6 level 2018       Social History     Socioeconomic History    Marital status:    Tobacco Use    Smoking status: Some Days     Current packs/day: 0.00     Average packs/day: 0.2 packs/day for 41.5 years (10.4 ttl pk-yrs)     Types: Cigars, Cigarettes     Start date: 1982     Last attempt to quit: 2023     Years since quittin.2    Smokeless tobacco: Never   Substance and Sexual Activity    Alcohol use: Not Currently     Alcohol/week: 2.0 standard drinks of alcohol     Types: 2 Cans of beer per week    Drug use: Not Currently     Types: Oxycodone    Sexual activity: Not Currently     Partners: Female     Birth control/protection: None     Social Drivers of Health     Financial Resource Strain: Low Risk  (10/10/2024)    Overall Financial Resource Strain (CARDIA)     Difficulty of Paying Living Expenses: Not hard at all   Food Insecurity: No Food Insecurity (10/10/2024)    Hunger Vital Sign     Worried About Running Out of Food in the Last Year: Never true     Ran Out of Food in the Last Year: Never true   Transportation Needs: No Transportation Needs (10/10/2024)    TRANSPORTATION NEEDS     Transportation : No   Physical Activity: Inactive (2023)    Exercise Vital Sign     Days of Exercise per Week: 0 days     Minutes of Exercise per Session: 0 min    Stress: No Stress Concern Present (10/10/2024)    [employer] - Occupational Stress Questionnaire     Feeling of Stress : Only a little   Housing Stability: Low Risk  (10/10/2024)    Housing Stability Vital Sign     Unable to Pay for Housing in the Last Year: No     Homeless in the Last Year: No       Precautions:     Allergies as of 10/08/2024 - Reviewed 10/08/2024   Allergen Reaction Noted    Baclofen Itching and Anxiety 06/17/2019       WO Assessment Details/Treatment        11/07/24 1155   WOCN Assessment   Visit Date 11/07/24   Visit Time 1155   Consult Type Follow Up   WOCN Speciality Ostomy   WOCN List colostomy  (pt. also has urostomy managed by Urology)   Wound surgical   Continence Type Fecal   Ostomy Type Colostomy   Procedure ostomy pouch   Intervention chart review;assessed   Teaching on-going        Colostomy 11/06/24 LUQ   Placement Date: 11/06/24   Inserted by: MD  Location: LUQ   Wound Image     Stomal Appliance 1 piece;Dry;Intact;Clean   Stoma Appearance round;red;moist;catheter   Site Assessment Clean;Intact   Peristomal Assessment ALDO   Accessories/Skin Care wafer barrier over peristomal skin   Stoma Function bowel sweat;no flatus;no stool   Tolerance no signs/symptoms of discomfort     WOCN follow up for colostomy education and care. POD #1. Discussed POC w/ nurse Kristin. No family at bedside. Explained reason for visit. Pt. Had colostomy created by general sx yesterday, 11/6/2024. Pt. Stated he has been in pain but it was controlled currently. Pt. Has red, moist, and round stoma. Bowel sweat present, but no flatus or stool at this time. Ordered ostomy supplies to pt.'s room. Nursing to cont. Tx recs and preventative measures. Will follow up with pt. Tomorrow for wound vac change and to check on colostomy for care/education.     11/07/2024

## 2024-11-07 NOTE — ASSESSMENT & PLAN NOTE
Patient's FSGs are controlled on current medication regimen.  Last A1c reviewed-   Lab Results   Component Value Date    HGBA1C 6.8 10/09/2024     Most recent fingerstick glucose reviewed-   Recent Labs   Lab 11/06/24  1643   POCTGLUCOSE 78     Current correctional scale  High  Maintain anti-hyperglycemic dose as follows-   Antihyperglycemics (From admission, onward)      Start     Stop Route Frequency Ordered    10/08/24 2354  insulin aspart U-100 injection 1-10 Units         -- SubQ Before meals & nightly PRN 10/08/24 2255          Hold Oral hypoglycemics while patient is in the hospital.

## 2024-11-07 NOTE — PLAN OF CARE
Problem: Diabetes Comorbidity  Goal: Blood Glucose Level Within Targeted Range  Outcome: Progressing     Problem: Infection  Goal: Absence of Infection Signs and Symptoms  Outcome: Progressing     Problem: Wound  Goal: Absence of Infection Signs and Symptoms  Outcome: Progressing  Goal: Improved Oral Intake  Outcome: Progressing  Goal: Skin Health and Integrity  Outcome: Progressing

## 2024-11-07 NOTE — CONSULTS
Inpatient Nutrition Assessment    Admit Date: 10/8/2024   Total duration of encounter: 30 days   Patient Age: 59 y.o.    Nutrition Recommendation/Prescription     -Advance diet as tolerated per MD. Goal Diet: Low Residue Diet- monitor need for diabetic diet restriction.   -Resume Boost VHC (pt requests strawberry flavor only) once daily with diet advancement to provide 530 kcal and 22 gm protein per container.   -Continue Malachi BID for wound healing.  -Resume appetite stimulant once medically feasible.   -Monitor wt, labs, and intake.     Communication of Recommendations: reviewed with patient    Nutrition Assessment     Malnutrition Assessment/Nutrition-Focused Physical Exam    Malnutrition Context: acute illness or injury (10/17/24 1218)  Malnutrition Level: moderate (10/17/24 1218)  Energy Intake (Malnutrition):  (family denies) (10/17/24 1218)  Weight Loss (Malnutrition):  (does not meet criteria) (10/17/24 1218)  Subcutaneous Fat (Malnutrition): mild depletion (10/17/24 1218)     Upper Arm Region (Subcutaneous Fat Loss): mild depletion     Muscle Mass (Malnutrition): mild depletion (10/17/24 1218)     Clavicle Bone Region (Muscle Loss): mild depletion                    Fluid Accumulation (Malnutrition): mild (10/17/24 1218)        A minimum of two characteristics is recommended for diagnosis of either severe or non-severe malnutrition.    Chart Review    Reason Seen: physician consult for tube feeding and follow-up    Malnutrition Screening Tool Results   Have you recently lost weight without trying?: No  Have you been eating poorly because of a decreased appetite?: No   MST Score: 0   Diagnosis:  Hypotension   Normocytic anemia   Leukocytosis   Sacral wound    Relevant Medical History: paraplegia, sick sinus syndrome s/p pacemaker placement, PAF on Xarelto, DVT status post IVC filter placement, DM-2, HTN, HLD, chronic hep C, chronic opiate dependence, chronic sacral, right buttock decubitus wound with  recurrent polymicrobial multidrug resistant infection including ESBL E coli, Enterobacter, carbapenem resistant Pseudomonas, Enterococcus faecalis     Scheduled Medications:  acetaminophen, 1,000 mg, Once  ampicillin-sulbactam, 3 g, Q6H  atorvastatin, 20 mg, Nightly  carvediloL, 25 mg, BID  ciprofloxacin, 400 mg, Q12H  doxycycline, 100 mg, Q12H  fenofibrate, 48 mg, Daily  FLUoxetine, 20 mg, Daily  gabapentin, 400 mg, Q8H  NIFEdipine, 60 mg, Daily  oxybutynin, 5 mg, TID  pantoprazole, 40 mg, Daily  sodium chloride 0.9%, 10 mL, Q6H    Continuous Infusions:  lactated ringers, Last Rate: 75 mL/hr at 11/06/24 1826      PRN Medications:   0.9%  NaCl infusion (for blood administration), , Q24H PRN  acetaminophen, 650 mg, Q4H PRN  albuterol-ipratropium, 3 mL, Q4H PRN  aluminum-magnesium hydroxide-simethicone, 30 mL, QID PRN  dextrose 10%, 12.5 g, PRN  dextrose 10%, 25 g, PRN  diphenhydrAMINE, 25 mg, Q6H PRN  diphenhydrAMINE, 50 mg, Q6H PRN  etomidate, , Code/trauma/sedation Med  glucagon (human recombinant), 1 mg, PRN  glucagon (human recombinant), 1 mg, PRN  glucose, 16 g, PRN  glucose, 24 g, PRN  guaiFENesin 100 mg/5 ml, 200 mg, Q4H PRN  heparin (PORCINE), 60 Units/kg (Adjusted), PRN  heparin (PORCINE), 30 Units/kg (Adjusted), PRN  hydrALAZINE, 20 mg, Q4H PRN  HYDROmorphone, 0.2 mg, Q5 Min PRN  hydrOXYzine HCL, 25 mg, BID PRN  hyoscyamine, 0.125 mg, Q4H PRN  insulin aspart U-100, 1-10 Units, QID (AC + HS) PRN  methocarbamoL, 750 mg, TID PRN  morphine, 2 mg, BID PRN  ondansetron, 4 mg, Q4H PRN  ondansetron, 4 mg, Daily PRN  oxyCODONE-acetaminophen, 1 tablet, Q4H PRN  prochlorperazine, 5 mg, Q6H PRN  rocuronium, , Code/trauma/sedation Med  sodium chloride 0.9%, 10 mL, PRN  sodium chloride 0.9%, 10 mL, PRN  traZODone, 200 mg, Nightly PRN    Calorie Containing IV Medications: no significant kcals from medications at this time    Recent Labs   Lab 11/02/24  0520 11/04/24  1307 11/05/24  0654 11/06/24  0604 11/06/24  1232  11/06/24  1233 11/07/24  0658     --  137 137  --  136 133*   K 3.7  --  3.9 4.1  --  4.0 4.1   CALCIUM 8.4*  --  8.7* 8.8  --  8.9 8.5*   PHOS  --   --  3.6  --   --  3.7 3.6   MG  --   --  1.60  --   --  2.00 1.80     --  103 104  --  103 102   CO2 27  --  23 22*  --  22* 24   BUN 15.6  --  18.2 15.7  --  17.8 16.2   CREATININE 1.15  --  1.27* 1.36*  --  1.47* 1.51*   EGFRNORACEVR >60  --  >60 60  --  54 53   GLUCOSE 79*  --  85 83  --  78* 79*   BILITOT  --   --   --   --   --  1.1  --    ALKPHOS  --   --   --   --   --  65  --    ALT  --   --   --   --   --  8  --    AST  --   --   --   --   --  20  --    ALBUMIN  --   --   --   --   --  2.6*  --    CRP  --   --   --  151.00*  --   --   --    WBC 8.11 9.49 8.44 7.24 9.30  --  8.44   HGB 8.5* 9.8* 8.7* 8.5* 7.8*  --  7.8*   HCT 26.8* 31.6* 27.1* 27.3* 24.5*  --  23.9*     Nutrition Orders:  Diet Clear Liquid  Dietary nutrition supplements BID; Malachi - Fruit Punch,Dietary nutrition supplements Daily; Boost Very High Calorie Nutritional Drink - Strawberry    Appetite/Oral Intake: not applicable/not applicable  Factors Affecting Nutritional Intake: clear liquid diet and decreased appetite  Social Needs Impacting Access to Food: none identified  Food/Islam/Cultural Preferences: none reported  Food Allergies: none reported  Last Bowel Movement: 11/05/24  Wound(s):     Altered Skin Integrity 06/28/23 2230 Right lateral Ankle #6-Tissue loss description: Full thickness       Wound 05/30/24 0000 Pressure Injury Right Buttocks-Tissue loss description: Full thickness       Altered Skin Integrity 01/09/24 0848 upper Sacral spine-Tissue loss description: Full thickness       Wound 08/22/24 0800 Other (comment) Left Heel-Tissue loss description: Full thickness    Comments    10/10: Pt was in surgery at time of visit. Having wound debridement with wound vac placement on Stage 4 wound of rt gluteus. Will add Malachi to assist with wound healing. Recommend vitamin  "regimen for wounds.     10/17/24 Patient transferred to ICU, on ventilator currently, no propofol. Spoke with patient's wife at bedside who reports patient was eating well with a good appetite prior to ICU transfer, she reports bringing him food from home, good intake of Malachi. Tube feeding recommendation provided.    10/18/24 Consult received for tube feeding, will order.    10/22/24: Per RN, pt did not want to really eat earlier; family present in the room and encouraging intake.     10/24/24: Per pt's family member, pt ate ~ half of breakfast. Pt denies n/v; would like a strawberry flavored oral supplement.     10/29/24: Pt still with decreased appetite; denies n/v; taking his oral supplements.     24: Pt with fair intake, eating a lot of outside foods; now on appetite stimulant; taking his Malachi; would only like Boost VHC once daily.     24: Per RN, taking in some; still on appetite stimulant.     24: Pt had colostomy creation yesterday; diet just advanced to clears.     Anthropometrics    Height: 5' 10" (177.8 cm), Height Method: Stated  Last Weight: 79.3 kg (174 lb 13.2 oz) (10/17/24 1212), Weight Method: Bed Scale  BMI (Calculated): 25.1  BMI Classification: normal (BMI 18.5-24.9)        Ideal Body Weight (IBW), Male: 166 lb     % Ideal Body Weight, Male (lb): 96.95 %                 Usual Body Weight (UBW), k.57 kg-77.11 kg  % Usual Body Weight: 109.5  % Weight Change From Usual Weight: 9.27 %  Usual Weight Provided By: family/caregiver    Wt Readings from Last 5 Encounters:   10/17/24 79.3 kg (174 lb 13.2 oz)   24 63.5 kg (139 lb 15.9 oz)   24 71.2 kg (156 lb 15.5 oz)   24 78.5 kg (173 lb 1 oz)   01/10/24 78.5 kg (173 lb 1 oz)     Weight Change(s) Since Admission:   (10/17) took bed weight 79.3 kg during rounds (estimated 4 kg subtracted for equipment), increase noted  Wt Readings from Last 1 Encounters:   10/17/24 1212 79.3 kg (174 lb 13.2 oz)   10/09/24 0430 73 kg " (160 lb 15 oz)   10/08/24 1719 63.5 kg (140 lb)   Admit Weight: 63.5 kg (140 lb) (10/08/24 1719), Weight Method: Stated    Estimated Needs    Weight Used For Calorie Calculations: 79.3 kg (174 lb 13.2 oz)  Energy Calorie Requirements (kcal): 1826-0397 kcal (25-30 kcal/kg)  Energy Need Method: Kcal/kg  Weight Used For Protein Calculations: 79.3 kg (174 lb 13.2 oz)  Protein Requirements: 95 gm (1.2g/kg)  Fluid Requirements (mL): 1983 mL  CHO Requirement: 262-315 gm (45% EEN)  Last Updated: 10/22    Enteral Nutrition Patient not receiving enteral nutrition at this time.    Parenteral Nutrition Patient not receiving parenteral nutrition support at this time.    Evaluation of Received Nutrient Intake    Calories: not meeting estimated needs  Protein: not meeting estimated needs    Patient Education Not applicable.    Nutrition Diagnosis     PES: Inadequate energy intake related to inability to consume sufficient nutrients as evidenced by less than 80% needs met. (active)  PES: Moderate acute disease or injury related malnutrition related to acute illness as evidenced by mild fat depletion, mild muscle depletion, and edema. (active)    Nutrition Interventions     Intervention(s): modified composition of enteral nutrition, modified rate of enteral nutrition, and collaboration with other providers  Goal: Meet greater than 80% of nutritional needs by follow-up. (goal progressing)  Goal: Tolerate enteral feeding at goal rate by follow-up. (goal discontinued)    Nutrition Goals & Monitoring     Dietitian will monitor: food and beverage intake, energy intake, weight, and electrolyte/renal panel  Discharge planning:  Low Residue Diet and Boost [facility] once medically feasible.   Nutrition Risk/Follow-Up: high (follow-up in 1-4 days)   Please consult if re-assessment needed sooner.

## 2024-11-07 NOTE — HOSPITAL COURSE
11/7/24-No new issues today.  Patient is resting comfortably at this time.    11/8/24-Patient is sitting up.  Will transfuse 2 units PRBCs.  Will continue with wound care

## 2024-11-08 LAB
ABO + RH BLD: NORMAL
ABO + RH BLD: NORMAL
ALBUMIN SERPL-MCNC: 2 G/DL (ref 3.4–4.8)
ALBUMIN/GLOB SERPL: 0.5 RATIO (ref 1.1–2)
ALP SERPL-CCNC: 60 UNIT/L (ref 40–150)
ALT SERPL-CCNC: 7 UNIT/L (ref 0–55)
ANION GAP SERPL CALC-SCNC: 7 MEQ/L
APTT PPP: 44.3 SECONDS (ref 23.2–33.7)
AST SERPL-CCNC: 17 UNIT/L (ref 5–34)
BASOPHILS # BLD AUTO: 0.01 X10(3)/MCL
BASOPHILS NFR BLD AUTO: 0.1 %
BILIRUB SERPL-MCNC: 0.7 MG/DL
BLD PROD TYP BPU: NORMAL
BLD PROD TYP BPU: NORMAL
BLOOD UNIT EXPIRATION DATE: NORMAL
BLOOD UNIT EXPIRATION DATE: NORMAL
BLOOD UNIT TYPE CODE: 5100
BLOOD UNIT TYPE CODE: 9500
BUN SERPL-MCNC: 14.6 MG/DL (ref 8.4–25.7)
CALCIUM SERPL-MCNC: 8.2 MG/DL (ref 8.8–10)
CHLORIDE SERPL-SCNC: 107 MMOL/L (ref 98–107)
CO2 SERPL-SCNC: 25 MMOL/L (ref 23–31)
CREAT SERPL-MCNC: 1.21 MG/DL (ref 0.72–1.25)
CREAT/UREA NIT SERPL: 12
CROSSMATCH INTERPRETATION: NORMAL
CROSSMATCH INTERPRETATION: NORMAL
DISPENSE STATUS: NORMAL
DISPENSE STATUS: NORMAL
EOSINOPHIL # BLD AUTO: 0.05 X10(3)/MCL (ref 0–0.9)
EOSINOPHIL NFR BLD AUTO: 0.7 %
ERYTHROCYTE [DISTWIDTH] IN BLOOD BY AUTOMATED COUNT: 16.3 % (ref 11.5–17)
GFR SERPLBLD CREATININE-BSD FMLA CKD-EPI: >60 ML/MIN/1.73/M2
GLOBULIN SER-MCNC: 3.8 GM/DL (ref 2.4–3.5)
GLUCOSE SERPL-MCNC: 91 MG/DL (ref 82–115)
GROUP & RH: NORMAL
HCT VFR BLD AUTO: 23.3 % (ref 42–52)
HGB BLD-MCNC: 7.3 G/DL (ref 14–18)
IMM GRANULOCYTES # BLD AUTO: 0.06 X10(3)/MCL (ref 0–0.04)
IMM GRANULOCYTES NFR BLD AUTO: 0.8 %
INDIRECT COOMBS: NORMAL
LYMPHOCYTES # BLD AUTO: 1.56 X10(3)/MCL (ref 0.6–4.6)
LYMPHOCYTES NFR BLD AUTO: 21.1 %
MAGNESIUM SERPL-MCNC: 1.6 MG/DL (ref 1.6–2.6)
MCH RBC QN AUTO: 27.5 PG (ref 27–31)
MCHC RBC AUTO-ENTMCNC: 31.3 G/DL (ref 33–36)
MCV RBC AUTO: 87.9 FL (ref 80–94)
MONOCYTES # BLD AUTO: 0.83 X10(3)/MCL (ref 0.1–1.3)
MONOCYTES NFR BLD AUTO: 11.2 %
NEUTROPHILS # BLD AUTO: 4.88 X10(3)/MCL (ref 2.1–9.2)
NEUTROPHILS NFR BLD AUTO: 66.1 %
NRBC BLD AUTO-RTO: 0 %
PLATELET # BLD AUTO: 450 X10(3)/MCL (ref 130–400)
PMV BLD AUTO: 8.6 FL (ref 7.4–10.4)
POCT GLUCOSE: 106 MG/DL (ref 70–110)
POCT GLUCOSE: 95 MG/DL (ref 70–110)
POTASSIUM SERPL-SCNC: 3.7 MMOL/L (ref 3.5–5.1)
PROT SERPL-MCNC: 5.8 GM/DL (ref 5.8–7.6)
RBC # BLD AUTO: 2.65 X10(6)/MCL (ref 4.7–6.1)
SODIUM SERPL-SCNC: 139 MMOL/L (ref 136–145)
SPECIMEN OUTDATE: NORMAL
UNIT NUMBER: NORMAL
UNIT NUMBER: NORMAL
WBC # BLD AUTO: 7.39 X10(3)/MCL (ref 4.5–11.5)

## 2024-11-08 PROCEDURE — 21400001 HC TELEMETRY ROOM

## 2024-11-08 PROCEDURE — 25000003 PHARM REV CODE 250: Performed by: NURSE PRACTITIONER

## 2024-11-08 PROCEDURE — 63600175 PHARM REV CODE 636 W HCPCS: Performed by: NURSE PRACTITIONER

## 2024-11-08 PROCEDURE — A4216 STERILE WATER/SALINE, 10 ML: HCPCS | Performed by: STUDENT IN AN ORGANIZED HEALTH CARE EDUCATION/TRAINING PROGRAM

## 2024-11-08 PROCEDURE — 85025 COMPLETE CBC W/AUTO DIFF WBC: CPT

## 2024-11-08 PROCEDURE — 36415 COLL VENOUS BLD VENIPUNCTURE: CPT

## 2024-11-08 PROCEDURE — 86900 BLOOD TYPING SEROLOGIC ABO: CPT | Performed by: INTERNAL MEDICINE

## 2024-11-08 PROCEDURE — 25000003 PHARM REV CODE 250: Performed by: STUDENT IN AN ORGANIZED HEALTH CARE EDUCATION/TRAINING PROGRAM

## 2024-11-08 PROCEDURE — 83735 ASSAY OF MAGNESIUM: CPT | Performed by: INTERNAL MEDICINE

## 2024-11-08 PROCEDURE — 36430 TRANSFUSION BLD/BLD COMPNT: CPT

## 2024-11-08 PROCEDURE — 80053 COMPREHEN METABOLIC PANEL: CPT | Performed by: INTERNAL MEDICINE

## 2024-11-08 PROCEDURE — 25000003 PHARM REV CODE 250: Performed by: INTERNAL MEDICINE

## 2024-11-08 PROCEDURE — 63600175 PHARM REV CODE 636 W HCPCS: Performed by: STUDENT IN AN ORGANIZED HEALTH CARE EDUCATION/TRAINING PROGRAM

## 2024-11-08 PROCEDURE — 36415 COLL VENOUS BLD VENIPUNCTURE: CPT | Performed by: INTERNAL MEDICINE

## 2024-11-08 PROCEDURE — 27000207 HC ISOLATION

## 2024-11-08 PROCEDURE — 27000221 HC OXYGEN, UP TO 24 HOURS

## 2024-11-08 PROCEDURE — 86923 COMPATIBILITY TEST ELECTRIC: CPT | Mod: 91 | Performed by: INTERNAL MEDICINE

## 2024-11-08 PROCEDURE — 25000003 PHARM REV CODE 250

## 2024-11-08 PROCEDURE — P9040 RBC LEUKOREDUCED IRRADIATED: HCPCS | Performed by: INTERNAL MEDICINE

## 2024-11-08 PROCEDURE — 85730 THROMBOPLASTIN TIME PARTIAL: CPT

## 2024-11-08 RX ORDER — POLYETHYLENE GLYCOL 3350 17 G/17G
17 POWDER, FOR SOLUTION ORAL DAILY
Status: DISCONTINUED | OUTPATIENT
Start: 2024-11-08 | End: 2024-11-09

## 2024-11-08 RX ORDER — AMOXICILLIN 250 MG
1 CAPSULE ORAL 2 TIMES DAILY
Status: DISCONTINUED | OUTPATIENT
Start: 2024-11-08 | End: 2024-11-16

## 2024-11-08 RX ORDER — HYOSCYAMINE SULFATE 0.12 MG/1
0.12 TABLET SUBLINGUAL EVERY 4 HOURS
Status: DISCONTINUED | OUTPATIENT
Start: 2024-11-08 | End: 2024-11-26 | Stop reason: HOSPADM

## 2024-11-08 RX ORDER — HYDROCODONE BITARTRATE AND ACETAMINOPHEN 500; 5 MG/1; MG/1
TABLET ORAL
Status: DISCONTINUED | OUTPATIENT
Start: 2024-11-08 | End: 2024-11-26 | Stop reason: HOSPADM

## 2024-11-08 RX ADMIN — GABAPENTIN 400 MG: 400 CAPSULE ORAL at 12:11

## 2024-11-08 RX ADMIN — AMPICILLIN SODIUM AND SULBACTAM SODIUM 3 G: 2; 1 INJECTION, POWDER, FOR SOLUTION INTRAMUSCULAR; INTRAVENOUS at 01:11

## 2024-11-08 RX ADMIN — AMPICILLIN SODIUM AND SULBACTAM SODIUM 3 G: 2; 1 INJECTION, POWDER, FOR SOLUTION INTRAMUSCULAR; INTRAVENOUS at 05:11

## 2024-11-08 RX ADMIN — OXYBUTYNIN CHLORIDE 5 MG: 5 TABLET ORAL at 03:11

## 2024-11-08 RX ADMIN — AMPICILLIN SODIUM AND SULBACTAM SODIUM 3 G: 2; 1 INJECTION, POWDER, FOR SOLUTION INTRAMUSCULAR; INTRAVENOUS at 06:11

## 2024-11-08 RX ADMIN — OXYCODONE AND ACETAMINOPHEN 1 TABLET: 10; 325 TABLET ORAL at 08:11

## 2024-11-08 RX ADMIN — SENNOSIDES AND DOCUSATE SODIUM 1 TABLET: 50; 8.6 TABLET ORAL at 09:11

## 2024-11-08 RX ADMIN — SODIUM CHLORIDE, PRESERVATIVE FREE 10 ML: 5 INJECTION INTRAVENOUS at 12:11

## 2024-11-08 RX ADMIN — OXYCODONE AND ACETAMINOPHEN 1 TABLET: 10; 325 TABLET ORAL at 09:11

## 2024-11-08 RX ADMIN — OXYCODONE AND ACETAMINOPHEN 1 TABLET: 10; 325 TABLET ORAL at 12:11

## 2024-11-08 RX ADMIN — DOXYCYCLINE HYCLATE 100 MG: 100 TABLET, COATED ORAL at 08:11

## 2024-11-08 RX ADMIN — HYOSCYAMINE SULFATE 0.12 MG: 0.12 TABLET SUBLINGUAL at 05:11

## 2024-11-08 RX ADMIN — CARVEDILOL 25 MG: 12.5 TABLET, FILM COATED ORAL at 09:11

## 2024-11-08 RX ADMIN — DOXYCYCLINE HYCLATE 100 MG: 100 TABLET, COATED ORAL at 09:11

## 2024-11-08 RX ADMIN — CIPROFLOXACIN 400 MG: 2 INJECTION, SOLUTION INTRAVENOUS at 09:11

## 2024-11-08 RX ADMIN — SODIUM CHLORIDE, PRESERVATIVE FREE 10 ML: 5 INJECTION INTRAVENOUS at 06:11

## 2024-11-08 RX ADMIN — HYDROXYZINE HYDROCHLORIDE 25 MG: 25 TABLET, FILM COATED ORAL at 09:11

## 2024-11-08 RX ADMIN — NIFEDIPINE 60 MG: 60 TABLET, FILM COATED, EXTENDED RELEASE ORAL at 08:11

## 2024-11-08 RX ADMIN — POLYETHYLENE GLYCOL 3350 17 G: 17 POWDER, FOR SOLUTION ORAL at 08:11

## 2024-11-08 RX ADMIN — OXYBUTYNIN CHLORIDE 5 MG: 5 TABLET ORAL at 08:11

## 2024-11-08 RX ADMIN — TRAZODONE HYDROCHLORIDE 200 MG: 100 TABLET ORAL at 09:11

## 2024-11-08 RX ADMIN — FLUOXETINE 20 MG: 20 CAPSULE ORAL at 08:11

## 2024-11-08 RX ADMIN — CIPROFLOXACIN 400 MG: 2 INJECTION, SOLUTION INTRAVENOUS at 08:11

## 2024-11-08 RX ADMIN — OXYCODONE AND ACETAMINOPHEN 1 TABLET: 10; 325 TABLET ORAL at 03:11

## 2024-11-08 RX ADMIN — OXYCODONE AND ACETAMINOPHEN 1 TABLET: 10; 325 TABLET ORAL at 05:11

## 2024-11-08 RX ADMIN — HYOSCYAMINE SULFATE 0.12 MG: 0.12 TABLET SUBLINGUAL at 09:11

## 2024-11-08 RX ADMIN — GABAPENTIN 400 MG: 400 CAPSULE ORAL at 08:11

## 2024-11-08 RX ADMIN — MORPHINE SULFATE 2 MG: 4 INJECTION, SOLUTION INTRAMUSCULAR; INTRAVENOUS at 07:11

## 2024-11-08 RX ADMIN — GABAPENTIN 400 MG: 400 CAPSULE ORAL at 06:11

## 2024-11-08 RX ADMIN — AMPICILLIN SODIUM AND SULBACTAM SODIUM 3 G: 2; 1 INJECTION, POWDER, FOR SOLUTION INTRAMUSCULAR; INTRAVENOUS at 12:11

## 2024-11-08 RX ADMIN — CARVEDILOL 25 MG: 12.5 TABLET, FILM COATED ORAL at 08:11

## 2024-11-08 RX ADMIN — PANTOPRAZOLE SODIUM 40 MG: 40 INJECTION, POWDER, LYOPHILIZED, FOR SOLUTION INTRAVENOUS at 08:11

## 2024-11-08 RX ADMIN — FENOFIBRATE 48 MG: 48 TABLET, FILM COATED ORAL at 08:11

## 2024-11-08 RX ADMIN — ATORVASTATIN CALCIUM 20 MG: 10 TABLET, FILM COATED ORAL at 09:11

## 2024-11-08 RX ADMIN — OXYBUTYNIN CHLORIDE 5 MG: 5 TABLET ORAL at 09:11

## 2024-11-08 RX ADMIN — SENNOSIDES AND DOCUSATE SODIUM 1 TABLET: 50; 8.6 TABLET ORAL at 08:11

## 2024-11-08 NOTE — PROGRESS NOTES
[facility] - 8th Floor Med Surg  Wound Care    Patient Name:  Bianca Khan   MRN:  [id number]  Date: 2024  Diagnosis: Sacral wound    History:     Past Medical History:   Diagnosis Date    Arthritis     Chronic ulcer of ankle 2022    ESBL (extended spectrum beta-lactamase) producing bacteria infection 2024    Frequent UTI 2019    Generalized anxiety disorder 2022    Neurogenic bladder 2022    Osteomyelitis 2022    Paraplegia     Presence of suprapubic catheter 2022    Pure hypercholesterolemia 2022    Retention of urine, unspecified 2019    Spinal cord injury at T1-T6 level 2018       Social History     Socioeconomic History    Marital status:    Tobacco Use    Smoking status: Some Days     Current packs/day: 0.00     Average packs/day: 0.2 packs/day for 41.5 years (10.4 ttl pk-yrs)     Types: Cigars, Cigarettes     Start date: 1982     Last attempt to quit: 2023     Years since quittin.2    Smokeless tobacco: Never   Substance and Sexual Activity    Alcohol use: Not Currently     Alcohol/week: 2.0 standard drinks of alcohol     Types: 2 Cans of beer per week    Drug use: Not Currently     Types: Oxycodone    Sexual activity: Not Currently     Partners: Female     Birth control/protection: None     Social Drivers of Health     Financial Resource Strain: Low Risk  (10/10/2024)    Overall Financial Resource Strain (CARDIA)     Difficulty of Paying Living Expenses: Not hard at all   Food Insecurity: No Food Insecurity (10/10/2024)    Hunger Vital Sign     Worried About Running Out of Food in the Last Year: Never true     Ran Out of Food in the Last Year: Never true   Transportation Needs: No Transportation Needs (10/10/2024)    TRANSPORTATION NEEDS     Transportation : No   Physical Activity: Inactive (2023)    Exercise Vital Sign     Days of Exercise per Week: 0 days     Minutes of Exercise per Session: 0 min    Stress: No Stress Concern Present (10/10/2024)    [employer] - Occupational Stress Questionnaire     Feeling of Stress : Only a little   Housing Stability: Low Risk  (10/10/2024)    Housing Stability Vital Sign     Unable to Pay for Housing in the Last Year: No     Homeless in the Last Year: No       Precautions:     Allergies as of 10/08/2024 - Reviewed 10/08/2024   Allergen Reaction Noted    Baclofen Itching and Anxiety 06/17/2019       WOC Assessment Details/Treatment        11/08/24 1133   WOCN Assessment   Visit Date 11/08/24   Visit Time 1320   Consult Type Follow Up   WOCN Speciality Ostomy;Wound   WOCN List wound vac;colostomy   Continence Type Fecal   Ostomy Type Colostomy   Procedure ostomy pouch   Intervention chart review;assessed;changed   Teaching on-going        Altered Skin Integrity 06/28/23 2230 Right lateral Ankle #6   Date First Assessed/Time First Assessed: 06/28/23 2230   Altered Skin Integrity Present on Admission - Did Patient arrive to the hospital with altered skin?: yes  Side: Right  Orientation: lateral  Location: Ankle  Wound Number: #6  Is this injury dev...   Wound Image   (camera malfuntion)   Description of Altered Skin Integrity Full thickness tissue loss. Subcutaneous fat may be visible but bone, tendon or muscle are not exposed   Dressing Appearance Intact;Dried drainage   Drainage Amount Scant   Drainage Characteristics/Odor Creamy   Appearance Pink;Red;Yellow   Tissue loss description Full thickness   Black (%), Wound Tissue Color 0 %   Red (%), Wound Tissue Color 90 %   Yellow (%), Wound Tissue Color 10 %   Periwound Area Pale white;Pink   Wound Edges Callused   Wound Length (cm) 1.6 cm   Wound Width (cm) 1 cm   Wound Depth (cm) 0.2 cm   Wound Volume (cm^3) 0.32 cm^3   Wound Surface Area (cm^2) 1.6 cm^2   Care Cleansed with:;Wound cleanser   Dressing Changed   Off Loading Off loading shoe        Altered Skin Integrity 01/09/24 0848 upper Sacral  spine   Date First Assessed/Time First Assessed: 01/09/24 0848   Altered Skin Integrity Present on Admission - Did Patient arrive to the hospital with altered skin?: suspected hospital acquired  Orientation: upper  Location: Sacral spine   Wound Image    Description of Altered Skin Integrity Full thickness tissue loss. Subcutaneous fat may be visible but bone, tendon or muscle are not exposed   Dressing Appearance Intact;Moist drainage   Drainage Amount Small   Drainage Characteristics/Odor Serosanguineous   Appearance Pink;Red;Maroon   Tissue loss description Full thickness   Black (%), Wound Tissue Color 0 %   Red (%), Wound Tissue Color 100 %   Yellow (%), Wound Tissue Color 0 %   Periwound Area Intact;Pale white   Wound Edges Defined   Wound Length (cm) 6 cm   Wound Width (cm) 7 cm   Wound Depth (cm) 1 cm   Wound Volume (cm^3) 42 cm^3   Wound Surface Area (cm^2) 42 cm^2   Care Cleansed with:;Wound cleanser   Dressing Changed;Gauze, wet to dry  (moistened vashe)        Wound 05/30/24 0000 Pressure Injury Right Buttocks   Date First Assessed/Time First Assessed: 05/30/24 0000   Primary Wound Type: Pressure Injury  Side: Right  Location: Buttocks  Is this injury device related?: No   Wound Image    Pressure Injury Stage U   Dressing Appearance Moist drainage   Drainage Amount Small   Drainage Characteristics/Odor Serosanguineous   Appearance Pink;Red   Tissue loss description Full thickness   Black (%), Wound Tissue Color 0 %   Red (%), Wound Tissue Color 90 %   Yellow (%), Wound Tissue Color 10 %   Periwound Area Macerated;Redness   Wound Edges Defined   Wound Length (cm) 6 cm   Wound Width (cm) 6 cm   Wound Depth (cm) 2 cm   Wound Volume (cm^3) 72 cm^3   Wound Surface Area (cm^2) 36 cm^2   Care Cleansed with:;Wound cleanser   Dressing Removed;Applied  (NPWT set@125mmHg)        Wound 08/22/24 0800 Other (comment) Left Heel   Date First Assessed/Time First Assessed: 08/22/24 0800   Primary Wound Type: Other  (comment)  Side: Left  Location: Heel   Wound Image     Dressing Appearance Intact;Dried drainage   Drainage Amount Scant   Drainage Characteristics/Odor Creamy   Appearance Pink;Red   Tissue loss description Full thickness   Black (%), Wound Tissue Color 0 %   Red (%), Wound Tissue Color 100 %   Yellow (%), Wound Tissue Color 0 %   Periwound Area Pale white;Dry   Wound Edges Callused   Wound Length (cm) 1.7 cm   Wound Width (cm) 2 cm   Wound Depth (cm) 0.2 cm   Wound Volume (cm^3) 0.68 cm^3   Wound Surface Area (cm^2) 3.4 cm^2   Care Cleansed with:;Wound cleanser   Dressing Changed        Negative Pressure Wound Therapy  10/10/24 1017   Placement Date/Time: 10/10/24 1017   Location: Buttocks  Additional Comments: SN: [id number]   NPWT Type Vacuum Therapy   Therapy Setting NPWT Continuous therapy   Pressure Setting NPWT 125 mmHg   Therapy Interventions NPWT Seal intact;Dressing changed   Sponges Inserted NPWT 1;Black;White   Sponges Removed NPWT 1;Black;White        Colostomy 11/06/24 LUQ   Placement Date: 11/06/24   Inserted by: MD  Location: LUQ   Wound Image    Stomal Appliance 1 piece;Dry;Intact   Stoma Appearance red;moist;round   Site Assessment Clean;Intact   Peristomal Assessment ALDO   Accessories/Skin Care wafer barrier over peristomal skin   Stoma Function stool;no flatus;bowel sweat   Tolerance no signs/symptoms of discomfort     WOCN follow up for colostomy education and care. POD #2. Discussed POC w/ nurse Kristin. Family at bedside. Explained reason for visit. Pt. Had colostomy created by general sx on 11/6/2024.  Pt. Has red, moist, and round stoma. Bowel sweat and stool present, but no flatus at this time.   Ostomy supplies in pt.'s room.   WOCN follow up for re-application of wound vac to right buttock and follow up for Left heel and right lateral ankle. Had assistance from Jaime from wound care team w/ wound vac placement. Wound vac application change to right buttock- 1 black foam removed. Cleaned  wound w/ vashe, 1 white foam, 1 black foam tracked to another black foam, seal intact w/ no leakage or blockage detected w/ setting at 125mmHg. Pt. Tolerated application well with no signs of pain or discomfort. Cont. Tx recs in orders for sacral and right lateral ankle. Treatment recommendations for left heel: Clean w/ vashe, dry well, apply Ca+ Ag to red/good tissue wound bed, abd pad, kerlix, and secure w/ tape. BID/Prn if soilage. Cont tx recs and orders for sacrum (vashe wet to dry, abd pad, tape) and Right lateral ankle: clean w/ vashe, dry well, apply Ca+ Ag cloth to wound bed, gauze, secure w/ tape. Nursing to cont. Tx recs and preventative measures. Will follow up next week.   11/08/2024
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
## 2024-11-08 NOTE — SUBJECTIVE & OBJECTIVE
Interval History:     Review of Systems   Constitutional:  Positive for activity change and fatigue.   HENT: Negative.     Eyes: Negative.    Respiratory: Negative.     Cardiovascular: Negative.    Gastrointestinal: Negative.    Endocrine: Negative.    Genitourinary: Negative.    Musculoskeletal:  Positive for gait problem.   Skin:  Positive for pallor and wound.   Allergic/Immunologic: Negative.    Neurological:  Positive for weakness.   Hematological:  Bruises/bleeds easily.   Psychiatric/Behavioral: Negative.       Objective:     Vital Signs (Most Recent):  Temp: 97.7 °F (36.5 °C) (11/08/24 1313)  Pulse: 84 (11/08/24 1331)  Resp: 20 (11/08/24 1251)  BP: (!) 144/83 (11/08/24 1331)  SpO2: 95 % (11/08/24 1331) Vital Signs (24h Range):  Temp:  [97.7 °F (36.5 °C)-98.1 °F (36.7 °C)] 97.7 °F (36.5 °C)  Pulse:  [81-97] 84  Resp:  [16-20] 20  SpO2:  [94 %-97 %] 95 %  BP: (144-169)/(82-91) 144/83     Weight: 79.3 kg (174 lb 13.2 oz)  Body mass index is 25.08 kg/m².    Intake/Output Summary (Last 24 hours) at 11/8/2024 1341  Last data filed at 11/8/2024 0600  Gross per 24 hour   Intake 2010 ml   Output 2170 ml   Net -160 ml         Physical Exam  Constitutional:       Appearance: Normal appearance. He is normal weight.   HENT:      Head: Normocephalic and atraumatic.      Nose: Nose normal.      Mouth/Throat:      Mouth: Mucous membranes are moist.      Pharynx: Oropharynx is clear.   Eyes:      Extraocular Movements: Extraocular movements intact.      Pupils: Pupils are equal, round, and reactive to light.   Cardiovascular:      Rate and Rhythm: Normal rate and regular rhythm.      Pulses: Normal pulses.      Heart sounds: Normal heart sounds.   Pulmonary:      Effort: Pulmonary effort is normal.      Breath sounds: Normal breath sounds.   Abdominal:      General: Bowel sounds are normal.      Palpations: Abdomen is soft.   Musculoskeletal:         General: Normal range of motion.      Cervical back: Normal range of motion  and neck supple.   Skin:     General: Skin is warm.      Capillary Refill: Capillary refill takes more than 3 seconds.      Coloration: Skin is pale.   Neurological:      General: No focal deficit present.      Mental Status: He is alert. Mental status is at baseline.   Psychiatric:         Mood and Affect: Mood normal.         Behavior: Behavior normal.         Thought Content: Thought content normal.         Judgment: Judgment normal.             Significant Labs: All pertinent labs within the past 24 hours have been reviewed.  BMP:   Recent Labs   Lab 11/08/24  0546      K 3.7      CO2 25   BUN 14.6   CREATININE 1.21   CALCIUM 8.2*   MG 1.60     CBC:   Recent Labs   Lab 11/07/24  0658 11/08/24  0546   WBC 8.44 7.39   HGB 7.8* 7.3*   HCT 23.9* 23.3*   * 450*     CMP:   Recent Labs   Lab 11/07/24  0658 11/08/24  0546   * 139   K 4.1 3.7    107   CO2 24 25   BUN 16.2 14.6   CREATININE 1.51* 1.21   CALCIUM 8.5* 8.2*   ALBUMIN  --  2.0*   BILITOT  --  0.7   ALKPHOS  --  60   AST  --  17   ALT  --  7     Magnesium:   Recent Labs   Lab 11/07/24  0658 11/08/24  0546   MG 1.80 1.60       Significant Imaging: I have reviewed all pertinent imaging results/findings within the past 24 hours.

## 2024-11-08 NOTE — PROGRESS NOTES
Infectious Diseases Progress Note  58 y/o male with past medical history of T-spine cord injury with paraplegia, diabetes, HTN, hepatitis-C, chronic MRSA L ankle wound/osteomyelitis, was on suppressive doxycycline and R knee infection/septic arthritis and osteomyelitis with GBS/Enterococcus and Ecoli isolates who presented to ER on 10/8 with increased generalized pain, increased foul smelling drainage from sacral wound with fever and chills. He was noted to be hypotensive per EMS. On admit he was afebrile without leukocytosis. ESR 98 and .80. MRSA PCR (-). U/A with 21-50 WBC, 0-5 RBC, 75 LE, no bacteria, negative nitrites. Urine culture negative. Blood cultures negative. Sacral wound culture with many Acinetobacter, many ESBL Ecoli, moderate Enterococcus, Bacteroides and Peptoniphilus. CT pelvis with contrast showed worsening inflammatory change of the sacral decubitus ulcers with gas now identified inferior to the right pubic ramus, stool noted throughout the colon as may be seen with constipation, pyelonephritis is not excluded. Seen by Surgery and underwent debridement of sacral wound. During his stay he was noted to have large amounts of bleeding from sacral wound. CT abdomen/pelvis on 10/17 showed interval development of L renal rupture with large subcapsular hematoma, L retroperitoneal hemorrhage. He received multiple blood/blood products over the past couple days. He was noted to be hypotensive and was transferred to ICU on 10/17. He was seen by Vascular and underwent Aortogram,  left renal angiography with coil embolization of left renal artery and right groin central venous line insertion on 10/17. Sputum culture on 108/23 revealing Pseudomonas. RUE venous ultrasound positive for deep  vein thrombosis in brachial and radial veins of the right upper extremity. Sputum culture with MDR Pseudomonas. S/P colostomy placement on 11/6.  He is currently on Unasyn and Cipro. Remains on chronic oral suppression  with Doxycycline    Subjective:  Lying in bed, no acute distress. Currently on 1L NC. Reports suprapubic catheter leaking. Afebrile.    Review of Systems   Constitutional:  Positive for malaise/fatigue.   HENT: Negative.     Eyes: Negative.    Respiratory:  Positive for shortness of breath.    Cardiovascular: Negative.    Gastrointestinal: Negative.    Musculoskeletal: Negative.    Skin: Negative.    Neurological:  Positive for focal weakness and weakness.   All other systems reviewed and are negative.      Review of patient's allergies indicates:   Allergen Reactions    Baclofen Itching and Anxiety       Past Medical History:   Diagnosis Date    Arthritis     Chronic ulcer of ankle 05/26/2022    ESBL (extended spectrum beta-lactamase) producing bacteria infection 08/30/2024    Frequent UTI 07/02/2019    Generalized anxiety disorder 05/26/2022    Neurogenic bladder 05/26/2022    Osteomyelitis 05/26/2022    Paraplegia     Presence of suprapubic catheter 05/26/2022    Pure hypercholesterolemia 05/26/2022    Retention of urine, unspecified 08/09/2019    Spinal cord injury at T1-T6 level 04/20/2018       Past Surgical History:   Procedure Laterality Date    ANGIOGRAM, RENAL ARTERIES, BILATERAL N/A 10/17/2024    Procedure: Angiogram, Renal Arteries, Bilateral;  Surgeon: Pati King MD;  Location: [facility] CATH LAB;  Service: Peripheral Vascular;  Laterality: N/A;  left renal artery coiling    APPLICATION OF WOUND VACUUM-ASSISTED CLOSURE DEVICE N/A 10/10/2024    Procedure: APPLICATION, WOUND VAC;  Surgeon: Rob Sinclair MD;  Location: [facility];  Service: General;  Laterality: N/A;    CREATION, COLOSTOMY, LAPAROSCOPIC N/A 11/6/2024    Procedure: CREATION, COLOSTOMY, LAPAROSCOPIC CONVERTED  TO OPEN;  Surgeon: Rob Sinclair MD;  Location: [facility];  Service: General;  Laterality: N/A;    EGD, WITH CLOSED BIOPSY N/A 8/29/2024    Procedure: EGD, WITH CLOSED BIOPSY;  Surgeon: Mikal Segovia MD;  Location: [facility]  ENDOSCOPY;  Service: Gastroenterology;  Laterality: N/A;    ESOPHAGOGASTRODUODENOSCOPY N/A 8/29/2024    Procedure: EGD;  Surgeon: Mikal Segovia MD;  Location: [facility] ENDOSCOPY;  Service: Gastroenterology;  Laterality: N/A;    FRACTURE SURGERY  2021    INCISION AND DRAINAGE, LOWER EXTREMITY Right 06/29/2023    Procedure: INCISION AND DRAINAGE, LOWER EXTREMITY;  Surgeon: Prabhu Shen DO;  Location: [facility] OR;  Service: Orthopedics;  Laterality: Right;  supine bone foam wash stuff cultures    INCISION AND DRAINAGE, LOWER EXTREMITY Right 11/30/2023    Procedure: INCISION AND DRAINAGE, LOWER EXTREMITY;  Surgeon: Prabhu Shen DO;  Location: [facility] OR;  Service: Orthopedics;  Laterality: Right;  supine any table bone foam wash stuff possible wound vac    INCISION AND DRAINAGE, LOWER EXTREMITY Right 12/1/2023    Procedure: INCISION AND DRAINAGE, LOWER EXTREMITY;  Surgeon: Prabhu Shen DO;  Location: [facility];  Service: Orthopedics;  Laterality: Right;  supine bone foam vascular bed removal of distal femoral plate, wash stuff, possible AKA    INSERTION OF INTRAMEDULLARY MARISA Right 08/10/2022    Procedure: INSERTION, INTRAMEDULLARY MARISA RIGHT TIBIA;  Surgeon: Jorge Orellana MD;  Location: [facility];  Service: Orthopedics;  Laterality: Right;    JOINT REPLACEMENT  2021    Ankel    PRESSURE ULCER DEBRIDEMENT N/A 10/10/2024    Procedure: DEBRIDEMENT, PRESSURE ULCER;  Surgeon: Rob Sinclair MD;  Location: [facility];  Service: General;  Laterality: N/A;  sacral wound, R buttock wound    REMOVAL OF HARDWARE FROM LOWER EXTREMITY Right 12/1/2023    Procedure: REMOVAL, HARDWARE, LOWER EXTREMITY;  Surgeon: Prabhu Shen DO;  Location: [facility];  Service: Orthopedics;  Laterality: Right;    SPINE SURGERY  March 1st 2018       Social History     Socioeconomic History    Marital status:    Tobacco Use    Smoking status: Some Days     Current packs/day: 0.00     Average packs/day: 0.2 packs/day for 41.5 years (10.4 ttl pk-yrs)      Types: Cigars, Cigarettes     Start date: 1982     Last attempt to quit: 2023     Years since quittin.2    Smokeless tobacco: Never   Substance and Sexual Activity    Alcohol use: Not Currently     Alcohol/week: 2.0 standard drinks of alcohol     Types: 2 Cans of beer per week    Drug use: Not Currently     Types: Oxycodone    Sexual activity: Not Currently     Partners: Female     Birth control/protection: None     Social Drivers of Health     Financial Resource Strain: Low Risk  (10/10/2024)    Overall Financial Resource Strain (CARDIA)     Difficulty of Paying Living Expenses: Not hard at all   Food Insecurity: No Food Insecurity (10/10/2024)    Hunger Vital Sign     Worried About Running Out of Food in the Last Year: Never true     Ran Out of Food in the Last Year: Never true   Transportation Needs: No Transportation Needs (10/10/2024)    TRANSPORTATION NEEDS     Transportation : No   Physical Activity: Inactive (2023)    Exercise Vital Sign     Days of Exercise per Week: 0 days     Minutes of Exercise per Session: 0 min   Stress: No Stress Concern Present (10/10/2024)    [employer] [place] [employer] - Occupational Stress Questionnaire     Feeling of Stress : Only a little   Housing Stability: Low Risk  (10/10/2024)    Housing Stability Vital Sign     Unable to Pay for Housing in the Last Year: No     Homeless in the Last Year: No       Scheduled Meds:   ampicillin-sulbactam  3 g Intravenous Q6H    atorvastatin  20 mg Oral Nightly    carvediloL  25 mg Oral BID    ciprofloxacin  400 mg Intravenous Q12H    doxycycline  100 mg Oral Q12H    fenofibrate  48 mg Oral Daily    FLUoxetine  20 mg Oral Daily    gabapentin  400 mg Oral Q8H    hyoscyamine  0.125 mg Sublingual Q4H    NIFEdipine  60 mg Oral Daily    oxybutynin  5 mg Per NG tube TID    pantoprazole  40 mg Intravenous Daily    polyethylene glycol  17 g Oral Daily    senna-docusate 8.6-50 mg  1 tablet Oral BID    sodium chloride  "0.9%  10 mL Intravenous Q6H     Continuous Infusions:   lactated ringers   Intravenous Continuous 75 mL/hr at 11/06/24 1826 New Bag at 11/06/24 1826       PRN Meds:  Current Facility-Administered Medications:     0.9%  NaCl infusion (for blood administration), , Intravenous, Q24H PRN    0.9%  NaCl infusion (for blood administration), , Intravenous, Q24H PRN    acetaminophen, 650 mg, Oral, Q4H PRN    albuterol-ipratropium, 3 mL, Nebulization, Q4H PRN    aluminum-magnesium hydroxide-simethicone, 30 mL, Oral, QID PRN    dextrose 10%, 12.5 g, Intravenous, PRN    dextrose 10%, 25 g, Intravenous, PRN    diphenhydrAMINE, 50 mg, Intravenous, Q6H PRN    etomidate, , Intravenous, Code/trauma/sedation Med    glucagon (human recombinant), 1 mg, Intramuscular, PRN    glucagon (human recombinant), 1 mg, Intramuscular, PRN    glucose, 16 g, Oral, PRN    glucose, 24 g, Oral, PRN    guaiFENesin 100 mg/5 ml, 200 mg, Oral, Q4H PRN    heparin (PORCINE), 60 Units/kg (Adjusted), Intravenous, PRN    heparin (PORCINE), 30 Units/kg (Adjusted), Intravenous, PRN    hydrALAZINE, 20 mg, Intravenous, Q4H PRN    hydrOXYzine HCL, 25 mg, Oral, BID PRN    insulin aspart U-100, 1-10 Units, Subcutaneous, QID (AC + HS) PRN    methocarbamoL, 750 mg, Oral, TID PRN    morphine, 2 mg, Intravenous, BID PRN    ondansetron, 4 mg, Intravenous, Q4H PRN    oxyCODONE-acetaminophen, 1 tablet, Oral, Q4H PRN    prochlorperazine, 5 mg, Intravenous, Q6H PRN    rocuronium, , Intravenous, Code/trauma/sedation Med    sodium chloride 0.9%, 10 mL, Intravenous, PRN    Flushing PICC/Midline Protocol, , , Until Discontinued **AND** sodium chloride 0.9%, 10 mL, Intravenous, Q6H **AND** sodium chloride 0.9%, 10 mL, Intravenous, PRN    traZODone, 200 mg, Oral, Nightly PRN    Objective:  BP (!) 161/95   Pulse 88   Temp 97.8 °F (36.6 °C) (Oral)   Resp 20   Ht 5' 10" (1.778 m)   Wt 79.3 kg (174 lb 13.2 oz)   SpO2 (!) 94%   BMI 25.08 kg/m²     Physical Exam:   Physical " Exam  Vitals reviewed.   HENT:      Head: Normocephalic.   Cardiovascular:      Rate and Rhythm: Normal rate and regular rhythm.   Pulmonary:      Effort: Pulmonary effort is normal. No respiratory distress.      Breath sounds: Normal breath sounds. No wheezing.      Comments: Currently on 1L NC  Abdominal:      General: Bowel sounds are normal. There is no distension.      Palpations: Abdomen is soft.      Tenderness: There is no abdominal tenderness.   Genitourinary:     Comments: Suprapubic catheter  Musculoskeletal:      Cervical back: Normal range of motion.      Comments: Paraplegia   Skin:     Findings: No rash.      Comments: Wounds dressed. R chest tunneled central line   Neurological:      Mental Status: He is alert and oriented to person, place, and time.   Psychiatric:         Behavior: Behavior normal.     Imaging    Lab Review   Recent Results (from the past 24 hours)   POCT glucose    Collection Time: 11/08/24  3:39 AM   Result Value Ref Range    POCT Glucose 95 70 - 110 mg/dL   APTT    Collection Time: 11/08/24  5:46 AM   Result Value Ref Range    PTT 44.3 (H) 23.2 - 33.7 seconds   Comprehensive Metabolic Panel    Collection Time: 11/08/24  5:46 AM   Result Value Ref Range    Sodium 139 136 - 145 mmol/L    Potassium 3.7 3.5 - 5.1 mmol/L    Chloride 107 98 - 107 mmol/L    CO2 25 23 - 31 mmol/L    Glucose 91 82 - 115 mg/dL    Blood Urea Nitrogen 14.6 8.4 - 25.7 mg/dL    Creatinine 1.21 0.72 - 1.25 mg/dL    Calcium 8.2 (L) 8.8 - 10.0 mg/dL    Protein Total 5.8 5.8 - 7.6 gm/dL    Albumin 2.0 (L) 3.4 - 4.8 g/dL    Globulin 3.8 (H) 2.4 - 3.5 gm/dL    Albumin/Globulin Ratio 0.5 (L) 1.1 - 2.0 ratio    Bilirubin Total 0.7 <=1.5 mg/dL    ALP 60 40 - 150 unit/L    ALT 7 0 - 55 unit/L    AST 17 5 - 34 unit/L    eGFR >60 mL/min/1.73/m2    Anion Gap 7.0 mEq/L    BUN/Creatinine Ratio 12    Magnesium    Collection Time: 11/08/24  5:46 AM   Result Value Ref Range    Magnesium Level 1.60 1.60 - 2.60 mg/dL   CBC with  Differential    Collection Time: 11/08/24  5:46 AM   Result Value Ref Range    WBC 7.39 4.50 - 11.50 x10(3)/mcL    RBC 2.65 (L) 4.70 - 6.10 x10(6)/mcL    Hgb 7.3 (L) 14.0 - 18.0 g/dL    Hct 23.3 (L) 42.0 - 52.0 %    MCV 87.9 80.0 - 94.0 fL    MCH 27.5 27.0 - 31.0 pg    MCHC 31.3 (L) 33.0 - 36.0 g/dL    RDW 16.3 11.5 - 17.0 %    Platelet 450 (H) 130 - 400 x10(3)/mcL    MPV 8.6 7.4 - 10.4 fL    Neut % 66.1 %    Lymph % 21.1 %    Mono % 11.2 %    Eos % 0.7 %    Basophil % 0.1 %    Lymph # 1.56 0.6 - 4.6 x10(3)/mcL    Neut # 4.88 2.1 - 9.2 x10(3)/mcL    Mono # 0.83 0.1 - 1.3 x10(3)/mcL    Eos # 0.05 0 - 0.9 x10(3)/mcL    Baso # 0.01 <=0.2 x10(3)/mcL    IG# 0.06 (H) 0 - 0.04 x10(3)/mcL    IG% 0.8 %    NRBC% 0.0 %   Prepare RBC 2 Units; to give    Collection Time: 11/08/24 10:15 AM   Result Value Ref Range    UNIT NUMBER [id number]     UNIT ABO/RH O NEG     DISPENSE STATUS Issued     Unit Expiration [id number]     Product Code C4716B76     Unit Blood Type Code 9500     CROSSMATCH INTERPRETATION Compatible     UNIT NUMBER [id number]     UNIT ABO/RH O POS     DISPENSE STATUS Issued     Unit Expiration [id number]     Product Code S5951P61     Unit Blood Type Code 5100     CROSSMATCH INTERPRETATION Compatible    Type & Screen    Collection Time: 11/08/24 10:15 AM   Result Value Ref Range    Group & Rh O POS     Indirect Elizabeth GEL NEG     Specimen Outdate 11/11/2024 23:59    POCT glucose    Collection Time: 11/08/24 10:36 AM   Result Value Ref Range    POCT Glucose 106 70 - 110 mg/dL       Assessment/Plan:  Sepsis - resolved  Sacral wound infection - CR Acinetobacter / ESBL Ecoli / Enterococcus / Bacteroides / Peptoniphilus isolates  Pneumonitis / Pleural effusions  MDR Pseudomonas (+) sputum  ANTON  Pyelonephritis per CT   Constipation  Paraplegia  Neurogenic bladder with suprapubic catheter  DM Type II  MRSA L ankle osteomyelitis with retained hardware  Anemia    -Continue Unasyn and Cipro as planned with end date  11/11  -Remains afebrile without leukocytosis, follow  -Plan to transfuse 2 units PRBCs today  -Urology consult for leakage around the suprapubic catheter, follow recs  -Currently on 1L NC, wean as tolerated  -Repeat CXR on 10/26 with pulmonary edema, bilateral effusions, lower lobe atelectasis unchanged.   -CXR on 10/21 with B/L pleural effusions with associated atelectasis and/or infiltrate  -Sputum culture on 10/23 with moderate Pseudomonas species, follow  -Pt with significant anemia, CT abdomen/pelvis showed L renal rupture with large subcapsular hematoma  -Seen by Vascular, inputs noted. S/P aortogram, left renal angiography with coil embolization of left renal artery on 10/17  -Received multiple units of blood/blood products during his stay  -CT pelvis with contrast showed worsening inflammatory change of sacral ulcer with gas inferior to the R pubic ramus  -S/P debridement of sacral wound on 10/10  -Sacral wound cultures revealing CR Acinetobacter, ESBL Ecoli, Enterococcus, Bacteroides, Peptoniphilus isolates  -Continue wound care  -Surgery following. S/P colostomy placement on 11/6  -Blood cultures negative   -Discussed with patient and nursing staff. Pt is now a DNR. CM working on placement

## 2024-11-08 NOTE — PLAN OF CARE
Problem: Diabetes Comorbidity  Goal: Blood Glucose Level Within Targeted Range  Outcome: Progressing     Problem: Infection  Goal: Absence of Infection Signs and Symptoms  Outcome: Progressing     Problem: Wound  Goal: Optimal Coping  Outcome: Progressing  Goal: Skin Health and Integrity  Outcome: Progressing
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
## 2024-11-08 NOTE — PLAN OF CARE
Problem: Adult Inpatient Plan of Care  Goal: Plan of Care Review  Outcome: Progressing  Flowsheets (Taken 11/8/2024 1347)  Plan of Care Reviewed With: patient  Goal: Patient-Specific Goal (Individualized)  Outcome: Progressing  Flowsheets (Taken 11/8/2024 1347)  Individualized Care Needs: to go home  Anxieties, Fears or Concerns: hospitalzation  Patient/Family-Specific Goals (Include Timeframe): to go home  Goal: Absence of Hospital-Acquired Illness or Injury  Outcome: Progressing  Intervention: Identify and Manage Fall Risk  Flowsheets (Taken 11/8/2024 1347)  Safety Promotion/Fall Prevention:   assistive device/personal item within reach   nonskid shoes/socks when out of bed  Intervention: Prevent Skin Injury  Flowsheets (Taken 11/8/2024 1347)  Body Position: turned  Skin Protection: incontinence pads utilized  Device Skin Pressure Protection: positioning supports utilized  Intervention: Prevent and Manage VTE (Venous Thromboembolism) Risk  Flowsheets (Taken 11/8/2024 1347)  VTE Prevention/Management: remove, assess skin, and reapply sequential compression device  Goal: Optimal Comfort and Wellbeing  Outcome: Progressing  Goal: Readiness for Transition of Care  Outcome: Progressing

## 2024-11-09 LAB
APTT PPP: 43.2 SECONDS (ref 23.2–33.7)
BASOPHILS # BLD AUTO: 0.03 X10(3)/MCL
BASOPHILS NFR BLD AUTO: 0.4 %
EOSINOPHIL # BLD AUTO: 0.06 X10(3)/MCL (ref 0–0.9)
EOSINOPHIL NFR BLD AUTO: 0.7 %
ERYTHROCYTE [DISTWIDTH] IN BLOOD BY AUTOMATED COUNT: 16.2 % (ref 11.5–17)
HCT VFR BLD AUTO: 32.8 % (ref 42–52)
HGB BLD-MCNC: 10.7 G/DL (ref 14–18)
IMM GRANULOCYTES # BLD AUTO: 0.07 X10(3)/MCL (ref 0–0.04)
IMM GRANULOCYTES NFR BLD AUTO: 0.8 %
IRON SATN MFR SERPL: ABNORMAL %
IRON SERPL-MCNC: 83 UG/DL (ref 65–175)
LYMPHOCYTES # BLD AUTO: 2.53 X10(3)/MCL (ref 0.6–4.6)
LYMPHOCYTES NFR BLD AUTO: 29.7 %
MCH RBC QN AUTO: 28.2 PG (ref 27–31)
MCHC RBC AUTO-ENTMCNC: 32.6 G/DL (ref 33–36)
MCV RBC AUTO: 86.5 FL (ref 80–94)
MONOCYTES # BLD AUTO: 0.96 X10(3)/MCL (ref 0.1–1.3)
MONOCYTES NFR BLD AUTO: 11.3 %
NEUTROPHILS # BLD AUTO: 4.86 X10(3)/MCL (ref 2.1–9.2)
NEUTROPHILS NFR BLD AUTO: 57.1 %
NRBC BLD AUTO-RTO: 0 %
PLATELET # BLD AUTO: 489 X10(3)/MCL (ref 130–400)
PMV BLD AUTO: 8.9 FL (ref 7.4–10.4)
POCT GLUCOSE: 116 MG/DL (ref 70–110)
POCT GLUCOSE: 80 MG/DL (ref 70–110)
RBC # BLD AUTO: 3.79 X10(6)/MCL (ref 4.7–6.1)
TIBC SERPL-MCNC: <25 UG/DL (ref 69–240)
TIBC SERPL-MCNC: ABNORMAL UG/DL
TRANSFERRIN SERPL-MCNC: 94 MG/DL (ref 174–364)
WBC # BLD AUTO: 8.51 X10(3)/MCL (ref 4.5–11.5)

## 2024-11-09 PROCEDURE — 25000003 PHARM REV CODE 250: Performed by: INTERNAL MEDICINE

## 2024-11-09 PROCEDURE — 25000003 PHARM REV CODE 250: Performed by: NURSE PRACTITIONER

## 2024-11-09 PROCEDURE — 25000003 PHARM REV CODE 250: Performed by: STUDENT IN AN ORGANIZED HEALTH CARE EDUCATION/TRAINING PROGRAM

## 2024-11-09 PROCEDURE — 27000207 HC ISOLATION

## 2024-11-09 PROCEDURE — 83540 ASSAY OF IRON: CPT | Performed by: HOSPITALIST

## 2024-11-09 PROCEDURE — 25000003 PHARM REV CODE 250

## 2024-11-09 PROCEDURE — 63600175 PHARM REV CODE 636 W HCPCS: Performed by: STUDENT IN AN ORGANIZED HEALTH CARE EDUCATION/TRAINING PROGRAM

## 2024-11-09 PROCEDURE — 63600175 PHARM REV CODE 636 W HCPCS: Performed by: HOSPITALIST

## 2024-11-09 PROCEDURE — A4216 STERILE WATER/SALINE, 10 ML: HCPCS | Performed by: STUDENT IN AN ORGANIZED HEALTH CARE EDUCATION/TRAINING PROGRAM

## 2024-11-09 PROCEDURE — 21400001 HC TELEMETRY ROOM

## 2024-11-09 PROCEDURE — 63600175 PHARM REV CODE 636 W HCPCS: Performed by: NURSE PRACTITIONER

## 2024-11-09 PROCEDURE — 36415 COLL VENOUS BLD VENIPUNCTURE: CPT

## 2024-11-09 PROCEDURE — 85730 THROMBOPLASTIN TIME PARTIAL: CPT

## 2024-11-09 PROCEDURE — 85025 COMPLETE CBC W/AUTO DIFF WBC: CPT

## 2024-11-09 RX ORDER — ENOXAPARIN SODIUM 100 MG/ML
1 INJECTION SUBCUTANEOUS EVERY 12 HOURS
Status: DISCONTINUED | OUTPATIENT
Start: 2024-11-09 | End: 2024-11-11

## 2024-11-09 RX ORDER — POLYETHYLENE GLYCOL 3350 17 G/17G
17 POWDER, FOR SOLUTION ORAL 2 TIMES DAILY
Status: DISCONTINUED | OUTPATIENT
Start: 2024-11-09 | End: 2024-11-26 | Stop reason: HOSPADM

## 2024-11-09 RX ADMIN — ATORVASTATIN CALCIUM 20 MG: 10 TABLET, FILM COATED ORAL at 09:11

## 2024-11-09 RX ADMIN — SODIUM CHLORIDE, PRESERVATIVE FREE 10 ML: 5 INJECTION INTRAVENOUS at 05:11

## 2024-11-09 RX ADMIN — CARVEDILOL 25 MG: 12.5 TABLET, FILM COATED ORAL at 09:11

## 2024-11-09 RX ADMIN — POLYETHYLENE GLYCOL 3350 17 G: 17 POWDER, FOR SOLUTION ORAL at 08:11

## 2024-11-09 RX ADMIN — GABAPENTIN 400 MG: 400 CAPSULE ORAL at 09:11

## 2024-11-09 RX ADMIN — HYOSCYAMINE SULFATE 0.12 MG: 0.12 TABLET SUBLINGUAL at 02:11

## 2024-11-09 RX ADMIN — POLYETHYLENE GLYCOL 3350 17 G: 17 POWDER, FOR SOLUTION ORAL at 09:11

## 2024-11-09 RX ADMIN — TRAZODONE HYDROCHLORIDE 200 MG: 100 TABLET ORAL at 09:11

## 2024-11-09 RX ADMIN — CIPROFLOXACIN 400 MG: 2 INJECTION, SOLUTION INTRAVENOUS at 09:11

## 2024-11-09 RX ADMIN — FENOFIBRATE 48 MG: 48 TABLET, FILM COATED ORAL at 08:11

## 2024-11-09 RX ADMIN — AMPICILLIN SODIUM AND SULBACTAM SODIUM 3 G: 2; 1 INJECTION, POWDER, FOR SOLUTION INTRAMUSCULAR; INTRAVENOUS at 11:11

## 2024-11-09 RX ADMIN — SENNOSIDES AND DOCUSATE SODIUM 1 TABLET: 50; 8.6 TABLET ORAL at 09:11

## 2024-11-09 RX ADMIN — AMPICILLIN SODIUM AND SULBACTAM SODIUM 3 G: 2; 1 INJECTION, POWDER, FOR SOLUTION INTRAMUSCULAR; INTRAVENOUS at 12:11

## 2024-11-09 RX ADMIN — PANTOPRAZOLE SODIUM 40 MG: 40 INJECTION, POWDER, LYOPHILIZED, FOR SOLUTION INTRAVENOUS at 08:11

## 2024-11-09 RX ADMIN — OXYCODONE AND ACETAMINOPHEN 1 TABLET: 10; 325 TABLET ORAL at 05:11

## 2024-11-09 RX ADMIN — AMPICILLIN SODIUM AND SULBACTAM SODIUM 3 G: 2; 1 INJECTION, POWDER, FOR SOLUTION INTRAMUSCULAR; INTRAVENOUS at 05:11

## 2024-11-09 RX ADMIN — MORPHINE SULFATE 2 MG: 4 INJECTION, SOLUTION INTRAMUSCULAR; INTRAVENOUS at 08:11

## 2024-11-09 RX ADMIN — OXYBUTYNIN CHLORIDE 5 MG: 5 TABLET ORAL at 08:11

## 2024-11-09 RX ADMIN — FLUOXETINE 20 MG: 20 CAPSULE ORAL at 08:11

## 2024-11-09 RX ADMIN — ENOXAPARIN SODIUM 80 MG: 80 INJECTION SUBCUTANEOUS at 09:11

## 2024-11-09 RX ADMIN — SENNOSIDES AND DOCUSATE SODIUM 1 TABLET: 50; 8.6 TABLET ORAL at 08:11

## 2024-11-09 RX ADMIN — DOXYCYCLINE HYCLATE 100 MG: 100 TABLET, COATED ORAL at 08:11

## 2024-11-09 RX ADMIN — HYOSCYAMINE SULFATE 0.12 MG: 0.12 TABLET SUBLINGUAL at 08:11

## 2024-11-09 RX ADMIN — GABAPENTIN 400 MG: 400 CAPSULE ORAL at 01:11

## 2024-11-09 RX ADMIN — HYDROXYZINE HYDROCHLORIDE 25 MG: 25 TABLET, FILM COATED ORAL at 09:11

## 2024-11-09 RX ADMIN — HYOSCYAMINE SULFATE 0.12 MG: 0.12 TABLET SUBLINGUAL at 09:11

## 2024-11-09 RX ADMIN — ENOXAPARIN SODIUM 80 MG: 80 INJECTION SUBCUTANEOUS at 08:11

## 2024-11-09 RX ADMIN — OXYCODONE AND ACETAMINOPHEN 1 TABLET: 10; 325 TABLET ORAL at 02:11

## 2024-11-09 RX ADMIN — HYOSCYAMINE SULFATE 0.12 MG: 0.12 TABLET SUBLINGUAL at 01:11

## 2024-11-09 RX ADMIN — SODIUM CHLORIDE, PRESERVATIVE FREE 10 ML: 5 INJECTION INTRAVENOUS at 11:11

## 2024-11-09 RX ADMIN — NIFEDIPINE 60 MG: 60 TABLET, FILM COATED, EXTENDED RELEASE ORAL at 08:11

## 2024-11-09 RX ADMIN — SODIUM CHLORIDE, PRESERVATIVE FREE 10 ML: 5 INJECTION INTRAVENOUS at 12:11

## 2024-11-09 RX ADMIN — OXYCODONE AND ACETAMINOPHEN 1 TABLET: 10; 325 TABLET ORAL at 01:11

## 2024-11-09 RX ADMIN — OXYBUTYNIN CHLORIDE 5 MG: 5 TABLET ORAL at 01:11

## 2024-11-09 RX ADMIN — HYOSCYAMINE SULFATE 0.12 MG: 0.12 TABLET SUBLINGUAL at 05:11

## 2024-11-09 RX ADMIN — OXYCODONE AND ACETAMINOPHEN 1 TABLET: 10; 325 TABLET ORAL at 08:11

## 2024-11-09 RX ADMIN — OXYBUTYNIN CHLORIDE 5 MG: 5 TABLET ORAL at 09:11

## 2024-11-09 RX ADMIN — CARVEDILOL 25 MG: 12.5 TABLET, FILM COATED ORAL at 08:11

## 2024-11-09 RX ADMIN — GABAPENTIN 400 MG: 400 CAPSULE ORAL at 05:11

## 2024-11-09 RX ADMIN — DOXYCYCLINE HYCLATE 100 MG: 100 TABLET, COATED ORAL at 09:11

## 2024-11-09 RX ADMIN — CIPROFLOXACIN 400 MG: 2 INJECTION, SOLUTION INTRAVENOUS at 08:11
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
## 2024-11-10 LAB
ANION GAP SERPL CALC-SCNC: 7 MEQ/L
BASOPHILS # BLD AUTO: 0.02 X10(3)/MCL
BASOPHILS NFR BLD AUTO: 0.3 %
BUN SERPL-MCNC: 18.8 MG/DL (ref 8.4–25.7)
CALCIUM SERPL-MCNC: 8.8 MG/DL (ref 8.8–10)
CHLORIDE SERPL-SCNC: 106 MMOL/L (ref 98–107)
CO2 SERPL-SCNC: 24 MMOL/L (ref 23–31)
CREAT SERPL-MCNC: 1.28 MG/DL (ref 0.72–1.25)
CREAT/UREA NIT SERPL: 15
EOSINOPHIL # BLD AUTO: 0.04 X10(3)/MCL (ref 0–0.9)
EOSINOPHIL NFR BLD AUTO: 0.5 %
ERYTHROCYTE [DISTWIDTH] IN BLOOD BY AUTOMATED COUNT: 16.5 % (ref 11.5–17)
GFR SERPLBLD CREATININE-BSD FMLA CKD-EPI: >60 ML/MIN/1.73/M2
GLUCOSE SERPL-MCNC: 76 MG/DL (ref 82–115)
HCT VFR BLD AUTO: 32.1 % (ref 42–52)
HGB BLD-MCNC: 10.6 G/DL (ref 14–18)
IMM GRANULOCYTES # BLD AUTO: 0.04 X10(3)/MCL (ref 0–0.04)
IMM GRANULOCYTES NFR BLD AUTO: 0.5 %
LYMPHOCYTES # BLD AUTO: 2.4 X10(3)/MCL (ref 0.6–4.6)
LYMPHOCYTES NFR BLD AUTO: 31.4 %
MCH RBC QN AUTO: 28.5 PG (ref 27–31)
MCHC RBC AUTO-ENTMCNC: 33 G/DL (ref 33–36)
MCV RBC AUTO: 86.3 FL (ref 80–94)
MONOCYTES # BLD AUTO: 0.83 X10(3)/MCL (ref 0.1–1.3)
MONOCYTES NFR BLD AUTO: 10.8 %
NEUTROPHILS # BLD AUTO: 4.32 X10(3)/MCL (ref 2.1–9.2)
NEUTROPHILS NFR BLD AUTO: 56.5 %
NRBC BLD AUTO-RTO: 0 %
PLATELET # BLD AUTO: 450 X10(3)/MCL (ref 130–400)
PMV BLD AUTO: 8.7 FL (ref 7.4–10.4)
POCT GLUCOSE: 106 MG/DL (ref 70–110)
POCT GLUCOSE: 89 MG/DL (ref 70–110)
POTASSIUM SERPL-SCNC: 3.9 MMOL/L (ref 3.5–5.1)
RBC # BLD AUTO: 3.72 X10(6)/MCL (ref 4.7–6.1)
SODIUM SERPL-SCNC: 137 MMOL/L (ref 136–145)
WBC # BLD AUTO: 7.65 X10(3)/MCL (ref 4.5–11.5)

## 2024-11-10 PROCEDURE — 25000003 PHARM REV CODE 250: Performed by: INTERNAL MEDICINE

## 2024-11-10 PROCEDURE — 25000003 PHARM REV CODE 250: Performed by: NURSE PRACTITIONER

## 2024-11-10 PROCEDURE — 63600175 PHARM REV CODE 636 W HCPCS: Performed by: INTERNAL MEDICINE

## 2024-11-10 PROCEDURE — A4216 STERILE WATER/SALINE, 10 ML: HCPCS | Performed by: STUDENT IN AN ORGANIZED HEALTH CARE EDUCATION/TRAINING PROGRAM

## 2024-11-10 PROCEDURE — 63600175 PHARM REV CODE 636 W HCPCS: Performed by: STUDENT IN AN ORGANIZED HEALTH CARE EDUCATION/TRAINING PROGRAM

## 2024-11-10 PROCEDURE — 63600175 PHARM REV CODE 636 W HCPCS: Performed by: HOSPITALIST

## 2024-11-10 PROCEDURE — 85025 COMPLETE CBC W/AUTO DIFF WBC: CPT

## 2024-11-10 PROCEDURE — 27000207 HC ISOLATION

## 2024-11-10 PROCEDURE — 80048 BASIC METABOLIC PNL TOTAL CA: CPT | Performed by: HOSPITALIST

## 2024-11-10 PROCEDURE — 25000003 PHARM REV CODE 250: Performed by: STUDENT IN AN ORGANIZED HEALTH CARE EDUCATION/TRAINING PROGRAM

## 2024-11-10 PROCEDURE — 25000003 PHARM REV CODE 250: Performed by: HOSPITALIST

## 2024-11-10 PROCEDURE — 63600175 PHARM REV CODE 636 W HCPCS: Performed by: NURSE PRACTITIONER

## 2024-11-10 PROCEDURE — 25000003 PHARM REV CODE 250

## 2024-11-10 PROCEDURE — 36415 COLL VENOUS BLD VENIPUNCTURE: CPT | Performed by: HOSPITALIST

## 2024-11-10 PROCEDURE — 21400001 HC TELEMETRY ROOM

## 2024-11-10 RX ORDER — NIFEDIPINE 90 MG/1
90 TABLET, EXTENDED RELEASE ORAL DAILY
Status: DISCONTINUED | OUTPATIENT
Start: 2024-11-11 | End: 2024-11-26 | Stop reason: HOSPADM

## 2024-11-10 RX ORDER — CARVEDILOL 12.5 MG/1
50 TABLET ORAL 2 TIMES DAILY
Status: DISCONTINUED | OUTPATIENT
Start: 2024-11-10 | End: 2024-11-26 | Stop reason: HOSPADM

## 2024-11-10 RX ADMIN — FENOFIBRATE 48 MG: 48 TABLET, FILM COATED ORAL at 09:11

## 2024-11-10 RX ADMIN — OXYBUTYNIN CHLORIDE 5 MG: 5 TABLET ORAL at 09:11

## 2024-11-10 RX ADMIN — HYOSCYAMINE SULFATE 0.12 MG: 0.12 TABLET SUBLINGUAL at 09:11

## 2024-11-10 RX ADMIN — GABAPENTIN 400 MG: 400 CAPSULE ORAL at 01:11

## 2024-11-10 RX ADMIN — OXYCODONE AND ACETAMINOPHEN 1 TABLET: 10; 325 TABLET ORAL at 04:11

## 2024-11-10 RX ADMIN — HYDROXYZINE HYDROCHLORIDE 25 MG: 25 TABLET, FILM COATED ORAL at 09:11

## 2024-11-10 RX ADMIN — AMPICILLIN SODIUM AND SULBACTAM SODIUM 3 G: 2; 1 INJECTION, POWDER, FOR SOLUTION INTRAMUSCULAR; INTRAVENOUS at 11:11

## 2024-11-10 RX ADMIN — HYDRALAZINE HYDROCHLORIDE 20 MG: 20 INJECTION INTRAMUSCULAR; INTRAVENOUS at 11:11

## 2024-11-10 RX ADMIN — DOXYCYCLINE HYCLATE 100 MG: 100 TABLET, COATED ORAL at 09:11

## 2024-11-10 RX ADMIN — HYOSCYAMINE SULFATE 0.12 MG: 0.12 TABLET SUBLINGUAL at 01:11

## 2024-11-10 RX ADMIN — SENNOSIDES AND DOCUSATE SODIUM 1 TABLET: 50; 8.6 TABLET ORAL at 09:11

## 2024-11-10 RX ADMIN — OXYCODONE AND ACETAMINOPHEN 1 TABLET: 10; 325 TABLET ORAL at 09:11

## 2024-11-10 RX ADMIN — MORPHINE SULFATE 2 MG: 4 INJECTION, SOLUTION INTRAMUSCULAR; INTRAVENOUS at 01:11

## 2024-11-10 RX ADMIN — ENOXAPARIN SODIUM 80 MG: 80 INJECTION SUBCUTANEOUS at 09:11

## 2024-11-10 RX ADMIN — METHOCARBAMOL 750 MG: 750 TABLET ORAL at 01:11

## 2024-11-10 RX ADMIN — CARVEDILOL 25 MG: 12.5 TABLET, FILM COATED ORAL at 09:11

## 2024-11-10 RX ADMIN — AMPICILLIN SODIUM AND SULBACTAM SODIUM 3 G: 2; 1 INJECTION, POWDER, FOR SOLUTION INTRAMUSCULAR; INTRAVENOUS at 06:11

## 2024-11-10 RX ADMIN — CIPROFLOXACIN 400 MG: 2 INJECTION, SOLUTION INTRAVENOUS at 09:11

## 2024-11-10 RX ADMIN — PANTOPRAZOLE SODIUM 40 MG: 40 INJECTION, POWDER, LYOPHILIZED, FOR SOLUTION INTRAVENOUS at 09:11

## 2024-11-10 RX ADMIN — POLYETHYLENE GLYCOL 3350 17 G: 17 POWDER, FOR SOLUTION ORAL at 09:11

## 2024-11-10 RX ADMIN — CARVEDILOL 50 MG: 12.5 TABLET, FILM COATED ORAL at 09:11

## 2024-11-10 RX ADMIN — GABAPENTIN 400 MG: 400 CAPSULE ORAL at 05:11

## 2024-11-10 RX ADMIN — TRAZODONE HYDROCHLORIDE 200 MG: 100 TABLET ORAL at 09:11

## 2024-11-10 RX ADMIN — OXYCODONE AND ACETAMINOPHEN 1 TABLET: 10; 325 TABLET ORAL at 08:11

## 2024-11-10 RX ADMIN — AMPICILLIN SODIUM AND SULBACTAM SODIUM 3 G: 2; 1 INJECTION, POWDER, FOR SOLUTION INTRAMUSCULAR; INTRAVENOUS at 05:11

## 2024-11-10 RX ADMIN — SODIUM CHLORIDE, PRESERVATIVE FREE 10 ML: 5 INJECTION INTRAVENOUS at 05:11

## 2024-11-10 RX ADMIN — OXYCODONE AND ACETAMINOPHEN 1 TABLET: 10; 325 TABLET ORAL at 05:11

## 2024-11-10 RX ADMIN — GABAPENTIN 400 MG: 400 CAPSULE ORAL at 09:11

## 2024-11-10 RX ADMIN — ATORVASTATIN CALCIUM 20 MG: 10 TABLET, FILM COATED ORAL at 09:11

## 2024-11-10 RX ADMIN — SODIUM CHLORIDE, PRESERVATIVE FREE 10 ML: 5 INJECTION INTRAVENOUS at 06:11

## 2024-11-10 RX ADMIN — SODIUM CHLORIDE, PRESERVATIVE FREE 10 ML: 5 INJECTION INTRAVENOUS at 11:11

## 2024-11-10 RX ADMIN — OXYBUTYNIN CHLORIDE 5 MG: 5 TABLET ORAL at 01:11

## 2024-11-10 RX ADMIN — NIFEDIPINE 60 MG: 60 TABLET, FILM COATED, EXTENDED RELEASE ORAL at 09:11

## 2024-11-10 RX ADMIN — FLUOXETINE 20 MG: 20 CAPSULE ORAL at 09:11

## 2024-11-10 RX ADMIN — OXYCODONE AND ACETAMINOPHEN 1 TABLET: 10; 325 TABLET ORAL at 01:11

## 2024-11-10 NOTE — PT/OT/SLP PROGRESS
Physical Therapy Treatment    Patient Name:  Bianca Khan   MRN:  [id number]    Patient refused despite max encouragement. Asked for PT to return tomorrow. PT to f/u
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
## 2024-11-11 LAB
ANION GAP SERPL CALC-SCNC: 8 MEQ/L
BASOPHILS # BLD AUTO: 0.02 X10(3)/MCL
BASOPHILS NFR BLD AUTO: 0.3 %
BUN SERPL-MCNC: 18.9 MG/DL (ref 8.4–25.7)
CALCIUM SERPL-MCNC: 8.4 MG/DL (ref 8.8–10)
CHLORIDE SERPL-SCNC: 106 MMOL/L (ref 98–107)
CO2 SERPL-SCNC: 22 MMOL/L (ref 23–31)
CREAT SERPL-MCNC: 1.25 MG/DL (ref 0.72–1.25)
CREAT/UREA NIT SERPL: 15
EOSINOPHIL # BLD AUTO: 0.05 X10(3)/MCL (ref 0–0.9)
EOSINOPHIL NFR BLD AUTO: 0.6 %
ERYTHROCYTE [DISTWIDTH] IN BLOOD BY AUTOMATED COUNT: 16.6 % (ref 11.5–17)
GFR SERPLBLD CREATININE-BSD FMLA CKD-EPI: >60 ML/MIN/1.73/M2
GLUCOSE SERPL-MCNC: 78 MG/DL (ref 82–115)
HCT VFR BLD AUTO: 28.2 % (ref 42–52)
HCT VFR BLD AUTO: 32.9 % (ref 42–52)
HGB BLD-MCNC: 10.8 G/DL (ref 14–18)
HGB BLD-MCNC: 9.3 G/DL (ref 14–18)
IMM GRANULOCYTES # BLD AUTO: 0.08 X10(3)/MCL (ref 0–0.04)
IMM GRANULOCYTES NFR BLD AUTO: 1 %
LYMPHOCYTES # BLD AUTO: 2.54 X10(3)/MCL (ref 0.6–4.6)
LYMPHOCYTES NFR BLD AUTO: 32.4 %
MCH RBC QN AUTO: 28.3 PG (ref 27–31)
MCHC RBC AUTO-ENTMCNC: 32.8 G/DL (ref 33–36)
MCV RBC AUTO: 86.1 FL (ref 80–94)
MONOCYTES # BLD AUTO: 0.96 X10(3)/MCL (ref 0.1–1.3)
MONOCYTES NFR BLD AUTO: 12.3 %
NEUTROPHILS # BLD AUTO: 4.18 X10(3)/MCL (ref 2.1–9.2)
NEUTROPHILS NFR BLD AUTO: 53.4 %
NRBC BLD AUTO-RTO: 0 %
PLATELET # BLD AUTO: 476 X10(3)/MCL (ref 130–400)
PMV BLD AUTO: 8.9 FL (ref 7.4–10.4)
POCT GLUCOSE: 78 MG/DL (ref 70–110)
POCT GLUCOSE: 93 MG/DL (ref 70–110)
POTASSIUM SERPL-SCNC: 4.2 MMOL/L (ref 3.5–5.1)
RBC # BLD AUTO: 3.82 X10(6)/MCL (ref 4.7–6.1)
SODIUM SERPL-SCNC: 136 MMOL/L (ref 136–145)
WBC # BLD AUTO: 7.83 X10(3)/MCL (ref 4.5–11.5)

## 2024-11-11 PROCEDURE — 63600175 PHARM REV CODE 636 W HCPCS: Performed by: NURSE PRACTITIONER

## 2024-11-11 PROCEDURE — 63600175 PHARM REV CODE 636 W HCPCS: Performed by: STUDENT IN AN ORGANIZED HEALTH CARE EDUCATION/TRAINING PROGRAM

## 2024-11-11 PROCEDURE — 85018 HEMOGLOBIN: CPT | Performed by: NURSE PRACTITIONER

## 2024-11-11 PROCEDURE — 94799 UNLISTED PULMONARY SVC/PX: CPT

## 2024-11-11 PROCEDURE — 25000003 PHARM REV CODE 250

## 2024-11-11 PROCEDURE — 25000003 PHARM REV CODE 250: Performed by: STUDENT IN AN ORGANIZED HEALTH CARE EDUCATION/TRAINING PROGRAM

## 2024-11-11 PROCEDURE — 86901 BLOOD TYPING SEROLOGIC RH(D): CPT | Performed by: NURSE PRACTITIONER

## 2024-11-11 PROCEDURE — 36415 COLL VENOUS BLD VENIPUNCTURE: CPT

## 2024-11-11 PROCEDURE — 0T2BX0Z CHANGE DRAINAGE DEVICE IN BLADDER, EXTERNAL APPROACH: ICD-10-PCS | Performed by: NURSE PRACTITIONER

## 2024-11-11 PROCEDURE — 36415 COLL VENOUS BLD VENIPUNCTURE: CPT | Performed by: NURSE PRACTITIONER

## 2024-11-11 PROCEDURE — 25000003 PHARM REV CODE 250: Performed by: NURSE PRACTITIONER

## 2024-11-11 PROCEDURE — 80048 BASIC METABOLIC PNL TOTAL CA: CPT | Performed by: HOSPITALIST

## 2024-11-11 PROCEDURE — 27000207 HC ISOLATION

## 2024-11-11 PROCEDURE — 97167 OT EVAL HIGH COMPLEX 60 MIN: CPT

## 2024-11-11 PROCEDURE — 27000221 HC OXYGEN, UP TO 24 HOURS

## 2024-11-11 PROCEDURE — 25000003 PHARM REV CODE 250: Performed by: INTERNAL MEDICINE

## 2024-11-11 PROCEDURE — A4216 STERILE WATER/SALINE, 10 ML: HCPCS | Performed by: STUDENT IN AN ORGANIZED HEALTH CARE EDUCATION/TRAINING PROGRAM

## 2024-11-11 PROCEDURE — 97163 PT EVAL HIGH COMPLEX 45 MIN: CPT

## 2024-11-11 PROCEDURE — 21400001 HC TELEMETRY ROOM

## 2024-11-11 PROCEDURE — 85025 COMPLETE CBC W/AUTO DIFF WBC: CPT

## 2024-11-11 PROCEDURE — 25000003 PHARM REV CODE 250: Performed by: HOSPITALIST

## 2024-11-11 RX ORDER — HYDROCHLOROTHIAZIDE 25 MG/1
25 TABLET ORAL DAILY
Status: DISCONTINUED | OUTPATIENT
Start: 2024-11-11 | End: 2024-11-26 | Stop reason: HOSPADM

## 2024-11-11 RX ADMIN — SODIUM CHLORIDE, PRESERVATIVE FREE 10 ML: 5 INJECTION INTRAVENOUS at 12:11

## 2024-11-11 RX ADMIN — HYDROCHLOROTHIAZIDE 25 MG: 25 TABLET ORAL at 01:11

## 2024-11-11 RX ADMIN — SENNOSIDES AND DOCUSATE SODIUM 1 TABLET: 50; 8.6 TABLET ORAL at 09:11

## 2024-11-11 RX ADMIN — HYOSCYAMINE SULFATE 0.12 MG: 0.12 TABLET SUBLINGUAL at 09:11

## 2024-11-11 RX ADMIN — AMPICILLIN SODIUM AND SULBACTAM SODIUM 3 G: 2; 1 INJECTION, POWDER, FOR SOLUTION INTRAMUSCULAR; INTRAVENOUS at 05:11

## 2024-11-11 RX ADMIN — SODIUM CHLORIDE, PRESERVATIVE FREE 10 ML: 5 INJECTION INTRAVENOUS at 05:11

## 2024-11-11 RX ADMIN — GABAPENTIN 400 MG: 400 CAPSULE ORAL at 05:11

## 2024-11-11 RX ADMIN — HYOSCYAMINE SULFATE 0.12 MG: 0.12 TABLET SUBLINGUAL at 01:11

## 2024-11-11 RX ADMIN — PANTOPRAZOLE SODIUM 40 MG: 40 INJECTION, POWDER, LYOPHILIZED, FOR SOLUTION INTRAVENOUS at 09:11

## 2024-11-11 RX ADMIN — CIPROFLOXACIN 400 MG: 2 INJECTION, SOLUTION INTRAVENOUS at 09:11

## 2024-11-11 RX ADMIN — DOXYCYCLINE HYCLATE 100 MG: 100 TABLET, COATED ORAL at 12:11

## 2024-11-11 RX ADMIN — OXYBUTYNIN CHLORIDE 5 MG: 5 TABLET ORAL at 04:11

## 2024-11-11 RX ADMIN — OXYCODONE AND ACETAMINOPHEN 1 TABLET: 10; 325 TABLET ORAL at 04:11

## 2024-11-11 RX ADMIN — DOXYCYCLINE HYCLATE 100 MG: 100 TABLET, COATED ORAL at 09:11

## 2024-11-11 RX ADMIN — NIFEDIPINE 90 MG: 90 TABLET, FILM COATED, EXTENDED RELEASE ORAL at 09:11

## 2024-11-11 RX ADMIN — RIVAROXABAN 20 MG: 10 TABLET, FILM COATED ORAL at 04:11

## 2024-11-11 RX ADMIN — AMPICILLIN SODIUM AND SULBACTAM SODIUM 3 G: 2; 1 INJECTION, POWDER, FOR SOLUTION INTRAMUSCULAR; INTRAVENOUS at 11:11

## 2024-11-11 RX ADMIN — OXYCODONE AND ACETAMINOPHEN 1 TABLET: 10; 325 TABLET ORAL at 09:11

## 2024-11-11 RX ADMIN — OXYCODONE AND ACETAMINOPHEN 1 TABLET: 10; 325 TABLET ORAL at 07:11

## 2024-11-11 RX ADMIN — AMPICILLIN SODIUM AND SULBACTAM SODIUM 3 G: 2; 1 INJECTION, POWDER, FOR SOLUTION INTRAMUSCULAR; INTRAVENOUS at 12:11

## 2024-11-11 RX ADMIN — POLYETHYLENE GLYCOL 3350 17 G: 17 POWDER, FOR SOLUTION ORAL at 09:11

## 2024-11-11 RX ADMIN — HYOSCYAMINE SULFATE 0.12 MG: 0.12 TABLET SUBLINGUAL at 02:11

## 2024-11-11 RX ADMIN — GABAPENTIN 400 MG: 400 CAPSULE ORAL at 01:11

## 2024-11-11 RX ADMIN — GABAPENTIN 400 MG: 400 CAPSULE ORAL at 09:11

## 2024-11-11 RX ADMIN — CARVEDILOL 50 MG: 12.5 TABLET, FILM COATED ORAL at 09:11

## 2024-11-11 RX ADMIN — FENOFIBRATE 48 MG: 48 TABLET, FILM COATED ORAL at 09:11

## 2024-11-11 RX ADMIN — OXYBUTYNIN CHLORIDE 5 MG: 5 TABLET ORAL at 09:11

## 2024-11-11 RX ADMIN — OXYCODONE AND ACETAMINOPHEN 1 TABLET: 10; 325 TABLET ORAL at 02:11

## 2024-11-11 RX ADMIN — HYOSCYAMINE SULFATE 0.12 MG: 0.12 TABLET SUBLINGUAL at 05:11

## 2024-11-11 RX ADMIN — SODIUM CHLORIDE, PRESERVATIVE FREE 10 ML: 5 INJECTION INTRAVENOUS at 11:11

## 2024-11-11 RX ADMIN — OXYCODONE AND ACETAMINOPHEN 1 TABLET: 10; 325 TABLET ORAL at 11:11

## 2024-11-11 RX ADMIN — FLUOXETINE 20 MG: 20 CAPSULE ORAL at 09:11

## 2024-11-11 RX ADMIN — ATORVASTATIN CALCIUM 20 MG: 10 TABLET, FILM COATED ORAL at 09:11

## 2024-11-11 RX ADMIN — MORPHINE SULFATE 2 MG: 4 INJECTION, SOLUTION INTRAMUSCULAR; INTRAVENOUS at 04:11

## 2024-11-11 NOTE — PLAN OF CARE
Problem: Physical Therapy  Goal: Physical Therapy Goal  Description: Goals to be met by: 24     Patient will increase functional independence with mobility by performin. Supine to sit with Stand-by Assistance  2. Sit to supine with Stand-by Assistance  3. Bed to chair transfer with Stand-by Assistance using Slideboard  4. Wheelchair propulsion x 150 feet with Supervision using bilateral upper extremities  Outcome: Progressing

## 2024-11-11 NOTE — PROGRESS NOTES
[facility] - 8th Floor Med Surg  Wound Care    Patient Name:  Bianca Khan   MRN:  [id number]  Date: 2024  Diagnosis: Sacral wound    History:     Past Medical History:   Diagnosis Date    Arthritis     Chronic ulcer of ankle 2022    ESBL (extended spectrum beta-lactamase) producing bacteria infection 2024    Frequent UTI 2019    Generalized anxiety disorder 2022    Neurogenic bladder 2022    Osteomyelitis 2022    Paraplegia     Presence of suprapubic catheter 2022    Pure hypercholesterolemia 2022    Retention of urine, unspecified 2019    Spinal cord injury at T1-T6 level 2018       Social History     Socioeconomic History    Marital status:    Tobacco Use    Smoking status: Some Days     Current packs/day: 0.00     Average packs/day: 0.2 packs/day for 41.5 years (10.4 ttl pk-yrs)     Types: Cigars, Cigarettes     Start date: 1982     Last attempt to quit: 2023     Years since quittin.2    Smokeless tobacco: Never   Substance and Sexual Activity    Alcohol use: Not Currently     Alcohol/week: 2.0 standard drinks of alcohol     Types: 2 Cans of beer per week    Drug use: Not Currently     Types: Oxycodone    Sexual activity: Not Currently     Partners: Female     Birth control/protection: None     Social Drivers of Health     Financial Resource Strain: Low Risk  (10/10/2024)    Overall Financial Resource Strain (CARDIA)     Difficulty of Paying Living Expenses: Not hard at all   Food Insecurity: No Food Insecurity (10/10/2024)    Hunger Vital Sign     Worried About Running Out of Food in the Last Year: Never true     Ran Out of Food in the Last Year: Never true   Transportation Needs: No Transportation Needs (10/10/2024)    TRANSPORTATION NEEDS     Transportation : No   Physical Activity: Inactive (2023)    Exercise Vital Sign     Days of Exercise per Week: 0 days     Minutes of Exercise per Session: 0 min    Stress: No Stress Concern Present (10/10/2024)    [employer] - Occupational Stress Questionnaire     Feeling of Stress : Only a little   Housing Stability: Low Risk  (10/10/2024)    Housing Stability Vital Sign     Unable to Pay for Housing in the Last Year: No     Homeless in the Last Year: No       Precautions:     Allergies as of 10/08/2024 - Reviewed 10/08/2024   Allergen Reaction Noted    Baclofen Itching and Anxiety 06/17/2019       WO Assessment Details/Treatment        11/11/24 1445   WOCN Assessment   Visit Date 11/11/24   Visit Time 1445   Consult Type Follow Up   WO Speciality Ostomy   WOCN List colostomy   Wound surgical   Continence Type Fecal   Ostomy Type Colostomy   Procedure ostomy pouch   Intervention chart review;assessed;changed   Teaching on-going        Colostomy 11/06/24 LUQ   Placement Date: 11/06/24   Inserted by: MD  Location: LUQ   Wound Image     Stomal Appliance 2 piece;Changed;Clean;Dry;Intact;No Leakage   Stoma Appearance round;red;moist;catheter   Site Assessment Clean;Intact   Peristomal Assessment Clean;Intact   Accessories/Skin Care cleansed w/ soap and water;skin barrier ring;wafer barrier over peristomal skin   Stoma Function flatus;stool;brown;thin liquid   Treatment Bag change   Tolerance no signs/symptoms of discomfort;did not assist with appliance change     WO follow up for colostomy education and care. POD #5. Discussed POC w/ nurse Morris. No family at bedside. Explained reason for visit. Pt. Had colostomy created by general sx on 11/6/2024.  Pt. Has red, moist, and round stoma. Light brown soft/liquid stool noted in pouch. Changed pt.'s stefan ring, barrier, and ostomy pouch. Pt. Tolerated change w/ no signs or symptoms of discomfort. Nursing to cont. Tx recs and preventative measures. Will follow up tomorrow for wound vac change.     11/11/2024
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
## 2024-11-11 NOTE — CONSULTS
Inpatient Nutrition Assessment    Admit Date: 10/8/2024   Total duration of encounter: 34 days   Patient Age: 59 y.o.    Nutrition Recommendation/Prescription     -Continue Regular Diet as tolerated. Encourage po intake of meals.   -Cancel Boost per pt request.    -Continue Malachi BID for wound healing.  -Resume appetite stimulant once medically feasible.   -Monitor wt, labs, and intake.     Communication of Recommendations: reviewed with patient    Nutrition Assessment     Malnutrition Assessment/Nutrition-Focused Physical Exam    Malnutrition Context: acute illness or injury (10/17/24 1218)  Malnutrition Level: moderate (10/17/24 1218)  Energy Intake (Malnutrition):  (family denies) (10/17/24 1218)  Weight Loss (Malnutrition):  (does not meet criteria) (10/17/24 1218)  Subcutaneous Fat (Malnutrition): mild depletion (10/17/24 1218)     Upper Arm Region (Subcutaneous Fat Loss): mild depletion     Muscle Mass (Malnutrition): mild depletion (10/17/24 1218)     Clavicle Bone Region (Muscle Loss): mild depletion                    Fluid Accumulation (Malnutrition): mild (10/17/24 1218)        A minimum of two characteristics is recommended for diagnosis of either severe or non-severe malnutrition.    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 including ESBL E coli, Enterobacter, carbapenem resistant Pseudomonas, Enterococcus faecalis      Scheduled Medications:  ampicillin-sulbactam, 3 g, Q6H  atorvastatin, 20 mg, Nightly  carvediloL, 50 mg, BID  ciprofloxacin, 400 mg, Q12H  doxycycline, 100 mg, Q12H  fenofibrate, 48 mg, Daily  FLUoxetine, 20 mg, Daily  gabapentin, 400 mg, Q8H  hyoscyamine, 0.125 mg, Q4H  NIFEdipine, 90 mg, Daily  oxybutynin, 5 mg, TID  pantoprazole, 40 mg, Daily  polyethylene glycol, 17 g, BID  rivaroxaban, 20 mg, Daily before evening meal  senna-docusate 8.6-50 mg, 1 tablet, BID  sodium chloride 0.9%, 10 mL, Q6H    Continuous Infusions:       PRN Medications:   0.9%  NaCl infusion (for blood administration), , Q24H PRN  0.9%  NaCl infusion (for blood administration), , Q24H PRN  acetaminophen, 650 mg, Q4H PRN  albuterol-ipratropium, 3 mL, Q4H PRN  aluminum-magnesium hydroxide-simethicone, 30 mL, QID PRN  dextrose 10%, 12.5 g, PRN  dextrose 10%, 25 g, PRN  diphenhydrAMINE, 50 mg, Q6H PRN  etomidate, , Code/trauma/sedation Med  glucagon (human recombinant), 1 mg, PRN  glucagon (human recombinant), 1 mg, PRN  glucose, 16 g, PRN  glucose, 24 g, PRN  guaiFENesin 100 mg/5 ml, 200 mg, Q4H PRN  hydrALAZINE, 20 mg, Q4H PRN  hydrOXYzine HCL, 25 mg, BID PRN  insulin aspart U-100, 1-10 Units, QID (AC + HS) PRN  methocarbamoL, 750 mg, TID PRN  morphine, 2 mg, BID PRN  ondansetron, 4 mg, Q4H PRN  oxyCODONE-acetaminophen, 1 tablet, Q4H PRN  prochlorperazine, 5 mg, Q6H PRN  rocuronium, , Code/trauma/sedation Med  sodium chloride 0.9%, 10 mL, PRN  sodium chloride 0.9%, 10 mL, PRN  traZODone, 200 mg, Nightly PRN    Calorie Containing IV Medications: no significant kcals from medications at this time    Recent Labs   Lab 11/05/24  0654 11/06/24  0604 11/06/24  1232 11/06/24  1233 11/07/24  0658 11/08/24  0546 11/09/24  0601 11/10/24  0711 11/11/24  0445    137  --  136 133* 139  --  137 136   K 3.9 4.1  --  4.0 4.1 3.7  --  3.9 4.2   CALCIUM 8.7* 8.8  --  8.9 8.5* 8.2*  --  8.8 8.4*   PHOS 3.6  --   --  3.7 3.6  --   --   --   --    MG 1.60   --   --  2.00 1.80 1.60  --   --   --     104  --  103 102 107  --  106 106   CO2 23 22*  --  22* 24 25  --  24 22*   BUN 18.2 15.7  --  17.8 16.2 14.6  --  18.8 18.9   CREATININE 1.27* 1.36*  --  1.47* 1.51* 1.21  --  1.28* 1.25   EGFRNORACEVR >60 60  --  54 53 >60  --  >60 >60   GLUCOSE 85 83  --  78* 79* 91  --  76* 78*   BILITOT  --   --   --  1.1  --  0.7  --   --   --    ALKPHOS  --   --   --  65  --  60  --   --   --    ALT  --   --   --  8  --  7  --   --   --    AST  --   --   --  20  --  17  --   --   --    ALBUMIN  --   --   --  2.6*  --  2.0*  --   --   --    CRP  --  151.00*  --   --   --   --   --   --   --    WBC 8.44 7.24 9.30  --  8.44 7.39 8.51 7.65 7.83   HGB 8.7* 8.5* 7.8*  --  7.8* 7.3* 10.7* 10.6* 10.8*   HCT 27.1* 27.3* 24.5*  --  23.9* 23.3* 32.8* 32.1* 32.9*     Nutrition Orders:  Diet Adult Regular  Dietary nutrition supplements BID; Malachi - Fruit Punch,Dietary nutrition supplements Daily; Boost Very High Calorie Nutritional Drink - Strawberry    Appetite/Oral Intake: poor/25-50% of meals  Factors Affecting Nutritional Intake: decreased appetite  Social Needs Impacting Access to Food: none identified  Food/Zoroastrian/Cultural Preferences: none reported  Food Allergies: none reported  Last Bowel Movement: 11/10/24  Wound(s):     Altered Skin Integrity 06/28/23 2230 Right lateral Ankle #6-Tissue loss description: Full thickness       Wound 05/30/24 0000 Pressure Injury Right Buttocks-Tissue loss description: Full thickness       Altered Skin Integrity 01/09/24 0848 upper Sacral spine-Tissue loss description: Full thickness       Wound 08/22/24 0800 Other (comment) Left Heel-Tissue loss description: Full thickness    Comments    10/10: Pt was in surgery at time of visit. Having wound debridement with wound vac placement on Stage 4 wound of rt gluteus. Will add Malachi to assist with wound healing. Recommend vitamin regimen for wounds.     10/17/24 Patient transferred to ICU, on ventilator  "currently, no propofol. Spoke with patient's wife at bedside who reports patient was eating well with a good appetite prior to ICU transfer, she reports bringing him food from home, good intake of Malachi. Tube feeding recommendation provided.    10/18/24 Consult received for tube feeding, will order.    10/22/24: Per RN, pt did not want to really eat earlier; family present in the room and encouraging intake.     10/24/24: Per pt's family member, pt ate ~ half of breakfast. Pt denies n/v; would like a strawberry flavored oral supplement.     10/29/24: Pt still with decreased appetite; denies n/v; taking his oral supplements.     24: Pt with fair intake, eating a lot of outside foods; now on appetite stimulant; taking his Malachi; would only like Boost VHC once daily.     24: Per RN, taking in some; still on appetite stimulant.     24: Pt had colostomy creation yesterday; diet just advanced to clears.     24: Pt on regular diet and tolerating but not taking much in, less than half of his meals. Pt is just not hungry; denies n/v; states that he is taking the Malachi but not drinking the Boost and would like to cancel the Boost.     Anthropometrics    Height: 5' 10" (177.8 cm), Height Method: Stated  Last Weight: 79.3 kg (174 lb 13.2 oz) (10/17/24 1212), Weight Method: Bed Scale  BMI (Calculated): 25.1  BMI Classification: normal (BMI 18.5-24.9)        Ideal Body Weight (IBW), Male: 166 lb     % Ideal Body Weight, Male (lb): 96.95 %                 Usual Body Weight (UBW), k.57 kg-77.11 kg  % Usual Body Weight: 109.5  % Weight Change From Usual Weight: 9.27 %  Usual Weight Provided By: family/caregiver    Wt Readings from Last 5 Encounters:   10/17/24 79.3 kg (174 lb 13.2 oz)   24 63.5 kg (139 lb 15.9 oz)   24 71.2 kg (156 lb 15.5 oz)   24 78.5 kg (173 lb 1 oz)   01/10/24 78.5 kg (173 lb 1 oz)     Weight Change(s) Since Admission:   (10/17) took bed weight 79.3 kg during rounds " (estimated 4 kg subtracted for equipment), increase noted  Wt Readings from Last 1 Encounters:   10/17/24 1212 79.3 kg (174 lb 13.2 oz)   10/09/24 0430 73 kg (160 lb 15 oz)   10/08/24 1719 63.5 kg (140 lb)   Admit Weight: 63.5 kg (140 lb) (10/08/24 1719), Weight Method: Stated  11/11/24: no new wt noted    Estimated Needs    Weight Used For Calorie Calculations: 79.3 kg (174 lb 13.2 oz)  Energy Calorie Requirements (kcal): 0471-0881 kcal (25-30 kcal/kg)  Energy Need Method: Kcal/kg  Weight Used For Protein Calculations: 79.3 kg (174 lb 13.2 oz)  Protein Requirements: 95 gm (1.2g/kg)  Fluid Requirements (mL): 1983 mL  CHO Requirement: 262-315 gm (45% EEN)  Last Updated: 10/22    Enteral Nutrition Patient not receiving enteral nutrition at this time.    Parenteral Nutrition Patient not receiving parenteral nutrition support at this time.    Evaluation of Received Nutrient Intake    Calories: not meeting estimated needs  Protein: not meeting estimated needs    Patient Education Not applicable.    Nutrition Diagnosis     PES: Inadequate energy intake related to inability to consume sufficient nutrients as evidenced by less than 80% needs met. (active)  PES: Moderate acute disease or injury related malnutrition related to acute illness as evidenced by mild fat depletion, mild muscle depletion, and edema. (active)    Nutrition Interventions     Intervention(s): modified composition of enteral nutrition, modified rate of enteral nutrition, and collaboration with other providers  Goal: Meet greater than 80% of nutritional needs by follow-up. (goal progressing)  Goal: Tolerate enteral feeding at goal rate by follow-up. (goal discontinued)    Nutrition Goals & Monitoring     Dietitian will monitor: food and beverage intake, energy intake, weight, and electrolyte/renal panel  Discharge planning:  Low Residue Diet with Malachi BID   Nutrition Risk/Follow-Up: high (follow-up in 1-4 days)   Please consult if re-assessment needed  sooner.

## 2024-11-11 NOTE — PLAN OF CARE
Problem: Occupational Therapy  Goal: Occupational Therapy Goal  Description: Goals to be met by: in 1 month      Patient will increase functional independence with ADLs by performing:    UE Dressing with Supervision.  LE Dressing with Minimal Assistance.  Grooming while EOB with [place].  Increased functional strength to 5/5 for BUE and increasing indep in functional mobility.  Perform functional transfers with min A.     11/11/2024 1237 by Radha Ling OT  Outcome: Progressing

## 2024-11-12 LAB
ABO + RH BLD: NORMAL
ALBUMIN SERPL-MCNC: 1.9 G/DL (ref 3.4–4.8)
ALBUMIN/GLOB SERPL: 0.4 RATIO (ref 1.1–2)
ALP SERPL-CCNC: 114 UNIT/L (ref 40–150)
ALT SERPL-CCNC: 7 UNIT/L (ref 0–55)
ANION GAP SERPL CALC-SCNC: 9 MEQ/L
AST SERPL-CCNC: 26 UNIT/L (ref 5–34)
BASOPHILS # BLD AUTO: 0.01 X10(3)/MCL
BASOPHILS # BLD AUTO: 0.02 X10(3)/MCL
BASOPHILS # BLD AUTO: 0.02 X10(3)/MCL
BASOPHILS NFR BLD AUTO: 0.1 %
BASOPHILS NFR BLD AUTO: 0.2 %
BASOPHILS NFR BLD AUTO: 0.3 %
BILIRUB SERPL-MCNC: 0.9 MG/DL
BLD PROD TYP BPU: NORMAL
BLOOD UNIT EXPIRATION DATE: NORMAL
BLOOD UNIT TYPE CODE: 5100
BUN SERPL-MCNC: 21.3 MG/DL (ref 8.4–25.7)
CALCIUM SERPL-MCNC: 8.6 MG/DL (ref 8.8–10)
CHLORIDE SERPL-SCNC: 104 MMOL/L (ref 98–107)
CO2 SERPL-SCNC: 23 MMOL/L (ref 23–31)
CREAT SERPL-MCNC: 1.25 MG/DL (ref 0.72–1.25)
CREAT/UREA NIT SERPL: 17
CROSSMATCH INTERPRETATION: NORMAL
DISPENSE STATUS: NORMAL
EOSINOPHIL # BLD AUTO: 0.03 X10(3)/MCL (ref 0–0.9)
EOSINOPHIL # BLD AUTO: 0.04 X10(3)/MCL (ref 0–0.9)
EOSINOPHIL # BLD AUTO: 0.05 X10(3)/MCL (ref 0–0.9)
EOSINOPHIL NFR BLD AUTO: 0.3 %
EOSINOPHIL NFR BLD AUTO: 0.5 %
EOSINOPHIL NFR BLD AUTO: 0.7 %
ERYTHROCYTE [DISTWIDTH] IN BLOOD BY AUTOMATED COUNT: 16.7 % (ref 11.5–17)
ERYTHROCYTE [DISTWIDTH] IN BLOOD BY AUTOMATED COUNT: 16.7 % (ref 11.5–17)
ERYTHROCYTE [DISTWIDTH] IN BLOOD BY AUTOMATED COUNT: 16.8 % (ref 11.5–17)
GFR SERPLBLD CREATININE-BSD FMLA CKD-EPI: >60 ML/MIN/1.73/M2
GLOBULIN SER-MCNC: 4.5 GM/DL (ref 2.4–3.5)
GLUCOSE SERPL-MCNC: 95 MG/DL (ref 82–115)
GROUP & RH: NORMAL
HCT VFR BLD AUTO: 21.5 % (ref 42–52)
HCT VFR BLD AUTO: 26.1 % (ref 42–52)
HCT VFR BLD AUTO: 29.1 % (ref 42–52)
HGB BLD-MCNC: 7 G/DL (ref 14–18)
HGB BLD-MCNC: 8.3 G/DL (ref 14–18)
HGB BLD-MCNC: 9.3 G/DL (ref 14–18)
IMM GRANULOCYTES # BLD AUTO: 0.06 X10(3)/MCL (ref 0–0.04)
IMM GRANULOCYTES # BLD AUTO: 0.06 X10(3)/MCL (ref 0–0.04)
IMM GRANULOCYTES # BLD AUTO: 0.09 X10(3)/MCL (ref 0–0.04)
IMM GRANULOCYTES NFR BLD AUTO: 0.8 %
IMM GRANULOCYTES NFR BLD AUTO: 0.8 %
IMM GRANULOCYTES NFR BLD AUTO: 0.9 %
INDIRECT COOMBS: NORMAL
LYMPHOCYTES # BLD AUTO: 2.41 X10(3)/MCL (ref 0.6–4.6)
LYMPHOCYTES # BLD AUTO: 2.58 X10(3)/MCL (ref 0.6–4.6)
LYMPHOCYTES # BLD AUTO: 2.69 X10(3)/MCL (ref 0.6–4.6)
LYMPHOCYTES NFR BLD AUTO: 27.3 %
LYMPHOCYTES NFR BLD AUTO: 33.4 %
LYMPHOCYTES NFR BLD AUTO: 33.5 %
MCH RBC QN AUTO: 28.2 PG (ref 27–31)
MCH RBC QN AUTO: 28.3 PG (ref 27–31)
MCH RBC QN AUTO: 28.5 PG (ref 27–31)
MCHC RBC AUTO-ENTMCNC: 31.8 G/DL (ref 33–36)
MCHC RBC AUTO-ENTMCNC: 32 G/DL (ref 33–36)
MCHC RBC AUTO-ENTMCNC: 32.6 G/DL (ref 33–36)
MCV RBC AUTO: 86.7 FL (ref 80–94)
MCV RBC AUTO: 89.1 FL (ref 80–94)
MCV RBC AUTO: 89.3 FL (ref 80–94)
MONOCYTES # BLD AUTO: 0.71 X10(3)/MCL (ref 0.1–1.3)
MONOCYTES # BLD AUTO: 0.85 X10(3)/MCL (ref 0.1–1.3)
MONOCYTES # BLD AUTO: 1.02 X10(3)/MCL (ref 0.1–1.3)
MONOCYTES NFR BLD AUTO: 10.4 %
MONOCYTES NFR BLD AUTO: 11 %
MONOCYTES NFR BLD AUTO: 9.9 %
NEUTROPHILS # BLD AUTO: 3.94 X10(3)/MCL (ref 2.1–9.2)
NEUTROPHILS # BLD AUTO: 4.18 X10(3)/MCL (ref 2.1–9.2)
NEUTROPHILS # BLD AUTO: 5.99 X10(3)/MCL (ref 2.1–9.2)
NEUTROPHILS NFR BLD AUTO: 54.2 %
NEUTROPHILS NFR BLD AUTO: 54.8 %
NEUTROPHILS NFR BLD AUTO: 60.9 %
NRBC BLD AUTO-RTO: 0 %
PLATELET # BLD AUTO: 355 X10(3)/MCL (ref 130–400)
PLATELET # BLD AUTO: 374 X10(3)/MCL (ref 130–400)
PLATELET # BLD AUTO: 427 X10(3)/MCL (ref 130–400)
PMV BLD AUTO: 8.8 FL (ref 7.4–10.4)
PMV BLD AUTO: 9.1 FL (ref 7.4–10.4)
PMV BLD AUTO: 9.2 FL (ref 7.4–10.4)
POCT GLUCOSE: 79 MG/DL (ref 70–110)
POCT GLUCOSE: 97 MG/DL (ref 70–110)
POTASSIUM SERPL-SCNC: 4.4 MMOL/L (ref 3.5–5.1)
PROT SERPL-MCNC: 6.4 GM/DL (ref 5.8–7.6)
RBC # BLD AUTO: 2.48 X10(6)/MCL (ref 4.7–6.1)
RBC # BLD AUTO: 2.93 X10(6)/MCL (ref 4.7–6.1)
RBC # BLD AUTO: 3.26 X10(6)/MCL (ref 4.7–6.1)
SODIUM SERPL-SCNC: 136 MMOL/L (ref 136–145)
SPECIMEN OUTDATE: NORMAL
UNIT NUMBER: NORMAL
WBC # BLD AUTO: 7.19 X10(3)/MCL (ref 4.5–11.5)
WBC # BLD AUTO: 7.72 X10(3)/MCL (ref 4.5–11.5)
WBC # BLD AUTO: 9.84 X10(3)/MCL (ref 4.5–11.5)

## 2024-11-12 PROCEDURE — 63600175 PHARM REV CODE 636 W HCPCS: Performed by: STUDENT IN AN ORGANIZED HEALTH CARE EDUCATION/TRAINING PROGRAM

## 2024-11-12 PROCEDURE — A4216 STERILE WATER/SALINE, 10 ML: HCPCS | Performed by: STUDENT IN AN ORGANIZED HEALTH CARE EDUCATION/TRAINING PROGRAM

## 2024-11-12 PROCEDURE — 27000207 HC ISOLATION

## 2024-11-12 PROCEDURE — 36415 COLL VENOUS BLD VENIPUNCTURE: CPT

## 2024-11-12 PROCEDURE — 25000003 PHARM REV CODE 250: Performed by: INTERNAL MEDICINE

## 2024-11-12 PROCEDURE — 36415 COLL VENOUS BLD VENIPUNCTURE: CPT | Performed by: STUDENT IN AN ORGANIZED HEALTH CARE EDUCATION/TRAINING PROGRAM

## 2024-11-12 PROCEDURE — 99900031 HC PATIENT EDUCATION (STAT)

## 2024-11-12 PROCEDURE — 25000003 PHARM REV CODE 250: Performed by: STUDENT IN AN ORGANIZED HEALTH CARE EDUCATION/TRAINING PROGRAM

## 2024-11-12 PROCEDURE — 25000003 PHARM REV CODE 250: Performed by: NURSE PRACTITIONER

## 2024-11-12 PROCEDURE — 86923 COMPATIBILITY TEST ELECTRIC: CPT

## 2024-11-12 PROCEDURE — 85025 COMPLETE CBC W/AUTO DIFF WBC: CPT | Performed by: STUDENT IN AN ORGANIZED HEALTH CARE EDUCATION/TRAINING PROGRAM

## 2024-11-12 PROCEDURE — 21400001 HC TELEMETRY ROOM

## 2024-11-12 PROCEDURE — 85025 COMPLETE CBC W/AUTO DIFF WBC: CPT

## 2024-11-12 PROCEDURE — 25000003 PHARM REV CODE 250: Performed by: HOSPITALIST

## 2024-11-12 PROCEDURE — 80053 COMPREHEN METABOLIC PANEL: CPT

## 2024-11-12 PROCEDURE — P9016 RBC LEUKOCYTES REDUCED: HCPCS

## 2024-11-12 PROCEDURE — 25000003 PHARM REV CODE 250

## 2024-11-12 PROCEDURE — 94760 N-INVAS EAR/PLS OXIMETRY 1: CPT

## 2024-11-12 PROCEDURE — 99900035 HC TECH TIME PER 15 MIN (STAT)

## 2024-11-12 PROCEDURE — 27000221 HC OXYGEN, UP TO 24 HOURS

## 2024-11-12 RX ORDER — SODIUM, POTASSIUM,MAG SULFATES 17.5-3.13G
1 SOLUTION, RECONSTITUTED, ORAL ORAL 2 TIMES DAILY
Status: COMPLETED | OUTPATIENT
Start: 2024-11-12 | End: 2024-11-13

## 2024-11-12 RX ORDER — HYDROCODONE BITARTRATE AND ACETAMINOPHEN 500; 5 MG/1; MG/1
TABLET ORAL
Status: DISCONTINUED | OUTPATIENT
Start: 2024-11-12 | End: 2024-11-26 | Stop reason: HOSPADM

## 2024-11-12 RX ADMIN — HYOSCYAMINE SULFATE 0.12 MG: 0.12 TABLET SUBLINGUAL at 03:11

## 2024-11-12 RX ADMIN — NIFEDIPINE 90 MG: 90 TABLET, FILM COATED, EXTENDED RELEASE ORAL at 08:11

## 2024-11-12 RX ADMIN — HYOSCYAMINE SULFATE 0.12 MG: 0.12 TABLET SUBLINGUAL at 08:11

## 2024-11-12 RX ADMIN — MORPHINE SULFATE 2 MG: 4 INJECTION, SOLUTION INTRAMUSCULAR; INTRAVENOUS at 08:11

## 2024-11-12 RX ADMIN — OXYBUTYNIN CHLORIDE 5 MG: 5 TABLET ORAL at 09:11

## 2024-11-12 RX ADMIN — SODIUM CHLORIDE, PRESERVATIVE FREE 10 ML: 5 INJECTION INTRAVENOUS at 12:11

## 2024-11-12 RX ADMIN — OXYCODONE AND ACETAMINOPHEN 1 TABLET: 10; 325 TABLET ORAL at 09:11

## 2024-11-12 RX ADMIN — FLUOXETINE 20 MG: 20 CAPSULE ORAL at 08:11

## 2024-11-12 RX ADMIN — OXYCODONE AND ACETAMINOPHEN 1 TABLET: 10; 325 TABLET ORAL at 04:11

## 2024-11-12 RX ADMIN — CARVEDILOL 50 MG: 12.5 TABLET, FILM COATED ORAL at 08:11

## 2024-11-12 RX ADMIN — DOXYCYCLINE HYCLATE 100 MG: 100 TABLET, COATED ORAL at 08:11

## 2024-11-12 RX ADMIN — HYOSCYAMINE SULFATE 0.12 MG: 0.12 TABLET SUBLINGUAL at 01:11

## 2024-11-12 RX ADMIN — ONDANSETRON 4 MG: 2 INJECTION INTRAMUSCULAR; INTRAVENOUS at 08:11

## 2024-11-12 RX ADMIN — HYOSCYAMINE SULFATE 0.12 MG: 0.12 TABLET SUBLINGUAL at 09:11

## 2024-11-12 RX ADMIN — GABAPENTIN 400 MG: 400 CAPSULE ORAL at 03:11

## 2024-11-12 RX ADMIN — ATORVASTATIN CALCIUM 20 MG: 10 TABLET, FILM COATED ORAL at 08:11

## 2024-11-12 RX ADMIN — OXYCODONE AND ACETAMINOPHEN 1 TABLET: 10; 325 TABLET ORAL at 02:11

## 2024-11-12 RX ADMIN — SODIUM SULFATE, POTASSIUM SULFATE, MAGNESIUM SULFATE 177 ML: 17.5; 3.13; 1.6 SOLUTION, CONCENTRATE ORAL at 06:11

## 2024-11-12 RX ADMIN — HYOSCYAMINE SULFATE 0.12 MG: 0.12 TABLET SUBLINGUAL at 05:11

## 2024-11-12 RX ADMIN — PANTOPRAZOLE SODIUM 40 MG: 40 INJECTION, POWDER, LYOPHILIZED, FOR SOLUTION INTRAVENOUS at 08:11

## 2024-11-12 RX ADMIN — HYDROCHLOROTHIAZIDE 25 MG: 25 TABLET ORAL at 08:11

## 2024-11-12 RX ADMIN — OXYBUTYNIN CHLORIDE 5 MG: 5 TABLET ORAL at 03:11

## 2024-11-12 RX ADMIN — GABAPENTIN 400 MG: 400 CAPSULE ORAL at 09:11

## 2024-11-12 RX ADMIN — SODIUM CHLORIDE, PRESERVATIVE FREE 10 ML: 5 INJECTION INTRAVENOUS at 05:11

## 2024-11-12 RX ADMIN — GABAPENTIN 400 MG: 400 CAPSULE ORAL at 05:11

## 2024-11-12 RX ADMIN — MORPHINE SULFATE 2 MG: 4 INJECTION, SOLUTION INTRAMUSCULAR; INTRAVENOUS at 01:11

## 2024-11-12 RX ADMIN — FENOFIBRATE 48 MG: 48 TABLET, FILM COATED ORAL at 08:11

## 2024-11-12 RX ADMIN — OXYBUTYNIN CHLORIDE 5 MG: 5 TABLET ORAL at 08:11

## 2024-11-12 RX ADMIN — SODIUM CHLORIDE, PRESERVATIVE FREE 10 ML: 5 INJECTION INTRAVENOUS at 06:11

## 2024-11-12 NOTE — PROGRESS NOTES
[facility] - 8th Floor Med Surg  Wound Care    Patient Name:  Bianca Khan   MRN:  [id number]  Date: 2024  Diagnosis: Sacral wound    History:     Past Medical History:   Diagnosis Date    Arthritis     Chronic ulcer of ankle 2022    ESBL (extended spectrum beta-lactamase) producing bacteria infection 2024    Frequent UTI 2019    Generalized anxiety disorder 2022    Neurogenic bladder 2022    Osteomyelitis 2022    Paraplegia     Presence of suprapubic catheter 2022    Pure hypercholesterolemia 2022    Retention of urine, unspecified 2019    Spinal cord injury at T1-T6 level 2018       Social History     Socioeconomic History    Marital status:    Tobacco Use    Smoking status: Some Days     Current packs/day: 0.00     Average packs/day: 0.2 packs/day for 41.5 years (10.4 ttl pk-yrs)     Types: Cigars, Cigarettes     Start date: 1982     Last attempt to quit: 2023     Years since quittin.2    Smokeless tobacco: Never   Substance and Sexual Activity    Alcohol use: Not Currently     Alcohol/week: 2.0 standard drinks of alcohol     Types: 2 Cans of beer per week    Drug use: Not Currently     Types: Oxycodone    Sexual activity: Not Currently     Partners: Female     Birth control/protection: None     Social Drivers of Health     Financial Resource Strain: Low Risk  (10/10/2024)    Overall Financial Resource Strain (CARDIA)     Difficulty of Paying Living Expenses: Not hard at all   Food Insecurity: No Food Insecurity (10/10/2024)    Hunger Vital Sign     Worried About Running Out of Food in the Last Year: Never true     Ran Out of Food in the Last Year: Never true   Transportation Needs: No Transportation Needs (10/10/2024)    TRANSPORTATION NEEDS     Transportation : No   Physical Activity: Inactive (2023)    Exercise Vital Sign     Days of Exercise per Week: 0 days     Minutes of Exercise per Session: 0 min    Stress: No Stress Concern Present (10/10/2024)    [employer] - Occupational Stress Questionnaire     Feeling of Stress : Only a little   Housing Stability: Low Risk  (10/10/2024)    Housing Stability Vital Sign     Unable to Pay for Housing in the Last Year: No     Homeless in the Last Year: No       Precautions:     Allergies as of 10/08/2024 - Reviewed 10/08/2024   Allergen Reaction Noted    Baclofen Itching and Anxiety 06/17/2019       WOC Assessment Details/Treatment        11/12/24 1400   WOCN Assessment   Visit Date 11/12/24   Visit Time 1400   Consult Type Follow Up   WOCN Speciality Wound;Ostomy   WOCN List wound vac;colostomy   Wound surgical   Continence Type Fecal   Ostomy Type Colostomy   Procedure ostomy pouch   Intervention chart review;assessed   Teaching on-going        Altered Skin Integrity 06/28/23 2230 Right lateral Ankle #6   Date First Assessed/Time First Assessed: 06/28/23 2230   Altered Skin Integrity Present on Admission - Did Patient arrive to the hospital with altered skin?: yes  Side: Right  Orientation: lateral  Location: Ankle  Wound Number: #6  Is this injury dev...   Wound Image    Description of Altered Skin Integrity Full thickness tissue loss. Subcutaneous fat may be visible but bone, tendon or muscle are not exposed   Dressing Appearance Dry;Intact;Clean   Drainage Amount Scant   Drainage Characteristics/Odor Creamy   Appearance Pink;Yellow   Tissue loss description Full thickness   Black (%), Wound Tissue Color 0 %   Red (%), Wound Tissue Color 50 %   Yellow (%), Wound Tissue Color 50 %   Periwound Area Pale white;Pink   Wound Edges Callused   Wound Length (cm) 2 cm   Wound Width (cm) 1 cm   Wound Depth (cm) 0.2 cm   Wound Volume (cm^3) 0.4 cm^3   Wound Surface Area (cm^2) 2 cm^2   Care Cleansed with:;Wound cleanser  (VASHE)   Dressing Changed;Calcium alginate;Silver;Foam   Off Loading Off loading shoe        Altered Skin Integrity 01/09/24 0848  upper Sacral spine   Date First Assessed/Time First Assessed: 01/09/24 0848   Altered Skin Integrity Present on Admission - Did Patient arrive to the hospital with altered skin?: suspected hospital acquired  Orientation: upper  Location: Sacral spine   Wound Image    Description of Altered Skin Integrity Full thickness tissue loss. Subcutaneous fat may be visible but bone, tendon or muscle are not exposed   Dressing Appearance Intact;Moist drainage   Drainage Amount Scant   Drainage Characteristics/Odor Serosanguineous   Appearance Pink;Red;Slough;Gray   Tissue loss description Full thickness   Black (%), Wound Tissue Color 20 %   Red (%), Wound Tissue Color 80 %   Yellow (%), Wound Tissue Color 0 %   Periwound Area Intact;Pale white   Wound Edges Defined   Wound Length (cm) 5 cm   Wound Width (cm) 6 cm   Wound Depth (cm) 1 cm   Wound Volume (cm^3) 30 cm^3   Wound Surface Area (cm^2) 30 cm^2   Care Cleansed with:;Wound cleanser   Dressing Applied  (NPWT tracked to other right buttock wound; 1 piece of versatel, 1 black foam tracked to another black foam)   Dressing Change Due 11/15/24        Wound 05/30/24 0000 Pressure Injury Right Buttocks   Date First Assessed/Time First Assessed: 05/30/24 0000   Primary Wound Type: Pressure Injury  Side: Right  Location: Buttocks  Is this injury device related?: No   Wound Image    Pressure Injury Stage U   Dressing Appearance Intact;Moist drainage   Drainage Amount Small   Drainage Characteristics/Odor Creamy;Serosanguineous   Appearance Pink;Red;Yellow   Tissue loss description Full thickness   Black (%), Wound Tissue Color 0 %   Red (%), Wound Tissue Color 50 %   Yellow (%), Wound Tissue Color 50 %   Periwound Area Macerated;Redness;Scar tissue   Wound Edges Defined   Wound Length (cm) 4.4 cm   Wound Width (cm) 6 cm   Wound Depth (cm) 3 cm   Wound Volume (cm^3) 79.2 cm^3   Wound Surface Area (cm^2) 26.4 cm^2   Care Cleansed with:;Wound cleanser  (VASHE)   Dressing  Changed;Other (comment)  (NPWT tracked to sacrum wound; 1 white foam, 1 black foam tracked to another black foam set @125mmHg)   Dressing Change Due 11/15/24        Wound 08/22/24 0800 Other (comment) Left Heel   Date First Assessed/Time First Assessed: 08/22/24 0800   Primary Wound Type: Other (comment)  Side: Left  Location: Heel   Wound Image    Dressing Appearance Intact;Moist drainage   Drainage Amount Small   Drainage Characteristics/Odor Creamy;Sanguineous   Appearance Pink;Red   Tissue loss description Full thickness   Black (%), Wound Tissue Color 0 %   Red (%), Wound Tissue Color 100 %   Yellow (%), Wound Tissue Color 0 %   Periwound Area Pale white;Dry   Wound Edges Callused   Wound Length (cm) 1.7 cm   Wound Width (cm) 2 cm   Wound Depth (cm) 0.2 cm   Wound Volume (cm^3) 0.68 cm^3   Wound Surface Area (cm^2) 3.4 cm^2   Care Cleansed with:;Wound cleanser  (VASHE)   Dressing Changed;Calcium alginate;Silver;Foam   Off Loading Off loading shoe        Negative Pressure Wound Therapy  10/10/24 1017   Placement Date/Time: 10/10/24 1017   Location: Buttocks  Additional Comments: SN: [id number]   NPWT Type Vacuum Therapy  (sacrum and right buttock)   Therapy Setting NPWT Continuous therapy   Pressure Setting NPWT 125 mmHg   Therapy Interventions NPWT Dressing changed;Seal intact   Sponges Inserted NPWT 1;White;Black   Sponges Removed NPWT 1;White;Black        Colostomy 11/06/24 LUQ   Placement Date: 11/06/24   Inserted by: MD  Location: LUQ   Wound Image    Stomal Appliance 2 piece;Clean;Dry;Intact;No Leakage   Stoma Appearance round;moist;red;catheter;other (see comments)  (bloody stool coming from ostomyl; General sx and GI notified)   Site Assessment Clean;Intact   Peristomal Assessment ALDO   Accessories/Skin Care wafer barrier over peristomal skin   Stoma Function stool;bright red  (MD's aware)   Tolerance signs/symptoms of discomfort     WOCN follow up for colostomy education and care. POD #6. Discussed POC  w/ nurse Dorothea. No family at bedside. Explained reason for visit. Pt. Had colostomy created by general sx on 11/6/2024.  Pt. Has red, moist, and round stoma. Pt. Is currently having bright red stool/output coming from stoma. Whole pills were noted to be in the pt.'s pouch when emptying. General sx is aware and consulted GI who will be scoping pt. Tomorrow. Pt. Stated he does feel weak today. Ostomy supplies in pt.'s room.   WOCN follow up for re-application of wound vac to right buttock and application of wound vac tracked to sacral wound along with follow up for Left heel and right lateral ankle. Had assistance from Jaime and Elvis from wound care team w/ wound vac placement. Wound vac application change to right buttock- 1 black foam, 1 white foam removed. Cleaned wound w/ vashe, 1 white foam, 1 black foam tracked to another black foam leading to sacral wound-1 piece of versatel and 1 black foam with seal intact w/ no leakage or blockage detected w/ setting at 125mmHg. Cont. Tx recs in orders for left heel and right lateral ankle. Treatment recommendations for left heel: Clean w/ vashe, dry well, apply Ca+ Ag to red/good tissue wound bed, abd pad or foam. BID/Prn if soilage. Right lateral ankle: clean w/ vashe, dry well, apply Ca+ Ag cloth to wound bed, gauze or foam. BID/PRN if soilage. Nursing to cont. Tx recs and preventative measures. Will follow up next week.   11/12/2024

## 2024-11-12 NOTE — PLAN OF CARE
Problem: Adult Inpatient Plan of Care  Goal: Plan of Care Review  Outcome: Progressing  Flowsheets (Taken 11/12/2024 0051)  Plan of Care Reviewed With: patient  Goal: Patient-Specific Goal (Individualized)  Outcome: Progressing  Goal: Absence of Hospital-Acquired Illness or Injury  Outcome: Progressing  Goal: Optimal Comfort and Wellbeing  Outcome: Progressing  Goal: Readiness for Transition of Care  Outcome: Progressing     Problem: Diabetes Comorbidity  Goal: Blood Glucose Level Within Targeted Range  Outcome: Progressing     Problem: Infection  Goal: Absence of Infection Signs and Symptoms  Outcome: Progressing     Problem: Wound  Goal: Optimal Coping  Outcome: Progressing  Goal: Optimal Functional Ability  Outcome: Progressing  Goal: Absence of Infection Signs and Symptoms  Outcome: Progressing  Goal: Improved Oral Intake  Outcome: Progressing  Goal: Optimal Pain Control and Function  Outcome: Progressing  Goal: Skin Health and Integrity  Outcome: Progressing  Goal: Optimal Wound Healing  Outcome: Progressing     Problem: Skin Injury Risk Increased  Goal: Skin Health and Integrity  Outcome: Progressing     Problem: Pain Acute  Goal: Optimal Pain Control and Function  Outcome: Progressing     Problem: Fall Injury Risk  Goal: Absence of Fall and Fall-Related Injury  Outcome: Progressing
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
## 2024-11-12 NOTE — NURSING
Pts ostomy output noted to be dark red in color. On call surgery MD notified. Stat H&H obtained. No other new orders noted at this time.

## 2024-11-12 NOTE — PT/OT/SLP PROGRESS
Physical Therapy      Patient Name:  Bianca Khan   MRN:  [id number]    Attempted to see pt for PT session today. Pt politely declined, stating he felt weaker today, also with new GI bleed. Will follow-up as schedule allows.    11/12/2024
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
## 2024-11-13 ENCOUNTER — ANESTHESIA (OUTPATIENT)
Dept: ENDOSCOPY | Facility: HOSPITAL | Age: 59
DRG: 853 | End: 2024-11-13
Payer: MEDICARE

## 2024-11-13 ENCOUNTER — ANESTHESIA EVENT (OUTPATIENT)
Dept: ENDOSCOPY | Facility: HOSPITAL | Age: 59
DRG: 853 | End: 2024-11-13
Payer: MEDICARE

## 2024-11-13 PROBLEM — K55.9 ISCHEMIC COLITIS: Status: ACTIVE | Noted: 2024-11-13

## 2024-11-13 LAB
BASOPHILS # BLD AUTO: 0.02 X10(3)/MCL
BASOPHILS # BLD AUTO: 0.03 X10(3)/MCL
BASOPHILS NFR BLD AUTO: 0.3 %
BASOPHILS NFR BLD AUTO: 0.3 %
EOSINOPHIL # BLD AUTO: 0.03 X10(3)/MCL (ref 0–0.9)
EOSINOPHIL # BLD AUTO: 0.07 X10(3)/MCL (ref 0–0.9)
EOSINOPHIL NFR BLD AUTO: 0.3 %
EOSINOPHIL NFR BLD AUTO: 0.9 %
ERYTHROCYTE [DISTWIDTH] IN BLOOD BY AUTOMATED COUNT: 15.8 % (ref 11.5–17)
ERYTHROCYTE [DISTWIDTH] IN BLOOD BY AUTOMATED COUNT: 16.4 % (ref 11.5–17)
HCT VFR BLD AUTO: 21.6 % (ref 42–52)
HCT VFR BLD AUTO: 27 % (ref 42–52)
HGB BLD-MCNC: 7.2 G/DL (ref 14–18)
HGB BLD-MCNC: 9 G/DL (ref 14–18)
IMM GRANULOCYTES # BLD AUTO: 0.05 X10(3)/MCL (ref 0–0.04)
IMM GRANULOCYTES # BLD AUTO: 0.1 X10(3)/MCL (ref 0–0.04)
IMM GRANULOCYTES NFR BLD AUTO: 0.6 %
IMM GRANULOCYTES NFR BLD AUTO: 1 %
LYMPHOCYTES # BLD AUTO: 2.53 X10(3)/MCL (ref 0.6–4.6)
LYMPHOCYTES # BLD AUTO: 3.15 X10(3)/MCL (ref 0.6–4.6)
LYMPHOCYTES NFR BLD AUTO: 31.8 %
LYMPHOCYTES NFR BLD AUTO: 31.9 %
MCH RBC QN AUTO: 28.6 PG (ref 27–31)
MCH RBC QN AUTO: 28.8 PG (ref 27–31)
MCHC RBC AUTO-ENTMCNC: 33.3 G/DL (ref 33–36)
MCHC RBC AUTO-ENTMCNC: 33.3 G/DL (ref 33–36)
MCV RBC AUTO: 85.7 FL (ref 80–94)
MCV RBC AUTO: 86.5 FL (ref 80–94)
MONOCYTES # BLD AUTO: 0.99 X10(3)/MCL (ref 0.1–1.3)
MONOCYTES # BLD AUTO: 1.21 X10(3)/MCL (ref 0.1–1.3)
MONOCYTES NFR BLD AUTO: 12.3 %
MONOCYTES NFR BLD AUTO: 12.5 %
NEUTROPHILS # BLD AUTO: 4.29 X10(3)/MCL (ref 2.1–9.2)
NEUTROPHILS # BLD AUTO: 5.35 X10(3)/MCL (ref 2.1–9.2)
NEUTROPHILS NFR BLD AUTO: 53.9 %
NEUTROPHILS NFR BLD AUTO: 54.2 %
NRBC BLD AUTO-RTO: 0 %
NRBC BLD AUTO-RTO: 0 %
PLATELET # BLD AUTO: 360 X10(3)/MCL (ref 130–400)
PLATELET # BLD AUTO: 383 X10(3)/MCL (ref 130–400)
PMV BLD AUTO: 9 FL (ref 7.4–10.4)
PMV BLD AUTO: 9.4 FL (ref 7.4–10.4)
POCT GLUCOSE: 74 MG/DL (ref 70–110)
POCT GLUCOSE: 77 MG/DL (ref 70–110)
RBC # BLD AUTO: 2.52 X10(6)/MCL (ref 4.7–6.1)
RBC # BLD AUTO: 3.12 X10(6)/MCL (ref 4.7–6.1)
WBC # BLD AUTO: 7.95 X10(3)/MCL (ref 4.5–11.5)
WBC # BLD AUTO: 9.87 X10(3)/MCL (ref 4.5–11.5)

## 2024-11-13 PROCEDURE — 25000003 PHARM REV CODE 250: Performed by: STUDENT IN AN ORGANIZED HEALTH CARE EDUCATION/TRAINING PROGRAM

## 2024-11-13 PROCEDURE — 21400001 HC TELEMETRY ROOM

## 2024-11-13 PROCEDURE — A4216 STERILE WATER/SALINE, 10 ML: HCPCS | Performed by: STUDENT IN AN ORGANIZED HEALTH CARE EDUCATION/TRAINING PROGRAM

## 2024-11-13 PROCEDURE — 25000003 PHARM REV CODE 250

## 2024-11-13 PROCEDURE — 85025 COMPLETE CBC W/AUTO DIFF WBC: CPT | Performed by: STUDENT IN AN ORGANIZED HEALTH CARE EDUCATION/TRAINING PROGRAM

## 2024-11-13 PROCEDURE — 25000003 PHARM REV CODE 250: Performed by: INTERNAL MEDICINE

## 2024-11-13 PROCEDURE — 37000008 HC ANESTHESIA 1ST 15 MINUTES: Performed by: INTERNAL MEDICINE

## 2024-11-13 PROCEDURE — 63600175 PHARM REV CODE 636 W HCPCS: Performed by: HOSPITALIST

## 2024-11-13 PROCEDURE — 99900035 HC TECH TIME PER 15 MIN (STAT)

## 2024-11-13 PROCEDURE — 11000001 HC ACUTE MED/SURG PRIVATE ROOM

## 2024-11-13 PROCEDURE — 25000003 PHARM REV CODE 250: Performed by: NURSE PRACTITIONER

## 2024-11-13 PROCEDURE — 85025 COMPLETE CBC W/AUTO DIFF WBC: CPT

## 2024-11-13 PROCEDURE — 27201423 OPTIME MED/SURG SUP & DEVICES STERILE SUPPLY: Performed by: INTERNAL MEDICINE

## 2024-11-13 PROCEDURE — 27000221 HC OXYGEN, UP TO 24 HOURS

## 2024-11-13 PROCEDURE — 37000009 HC ANESTHESIA EA ADD 15 MINS: Performed by: INTERNAL MEDICINE

## 2024-11-13 PROCEDURE — 88305 TISSUE EXAM BY PATHOLOGIST: CPT | Performed by: INTERNAL MEDICINE

## 2024-11-13 PROCEDURE — 44389 COLONOSCOPY WITH BIOPSY: CPT | Performed by: INTERNAL MEDICINE

## 2024-11-13 PROCEDURE — 27000207 HC ISOLATION

## 2024-11-13 PROCEDURE — 36415 COLL VENOUS BLD VENIPUNCTURE: CPT | Performed by: STUDENT IN AN ORGANIZED HEALTH CARE EDUCATION/TRAINING PROGRAM

## 2024-11-13 PROCEDURE — 63600175 PHARM REV CODE 636 W HCPCS

## 2024-11-13 PROCEDURE — 63600175 PHARM REV CODE 636 W HCPCS: Performed by: NURSE ANESTHETIST, CERTIFIED REGISTERED

## 2024-11-13 PROCEDURE — 36415 COLL VENOUS BLD VENIPUNCTURE: CPT

## 2024-11-13 PROCEDURE — 25000003 PHARM REV CODE 250: Performed by: HOSPITALIST

## 2024-11-13 PROCEDURE — 0DBM8ZX EXCISION OF DESCENDING COLON, VIA NATURAL OR ARTIFICIAL OPENING ENDOSCOPIC, DIAGNOSTIC: ICD-10-PCS | Performed by: INTERNAL MEDICINE

## 2024-11-13 PROCEDURE — 63600175 PHARM REV CODE 636 W HCPCS: Performed by: STUDENT IN AN ORGANIZED HEALTH CARE EDUCATION/TRAINING PROGRAM

## 2024-11-13 RX ORDER — SODIUM CHLORIDE, SODIUM LACTATE, POTASSIUM CHLORIDE, CALCIUM CHLORIDE 600; 310; 30; 20 MG/100ML; MG/100ML; MG/100ML; MG/100ML
INJECTION, SOLUTION INTRAVENOUS CONTINUOUS
Status: DISCONTINUED | OUTPATIENT
Start: 2024-11-13 | End: 2024-11-15

## 2024-11-13 RX ORDER — PROPOFOL 10 MG/ML
VIAL (ML) INTRAVENOUS
Status: COMPLETED
Start: 2024-11-13 | End: 2024-11-13

## 2024-11-13 RX ORDER — LIDOCAINE HYDROCHLORIDE 20 MG/ML
INJECTION INTRAVENOUS
Status: DISCONTINUED | OUTPATIENT
Start: 2024-11-13 | End: 2024-11-13

## 2024-11-13 RX ORDER — PROPOFOL 10 MG/ML
INJECTION, EMULSION INTRAVENOUS
Status: DISCONTINUED | OUTPATIENT
Start: 2024-11-13 | End: 2024-11-13

## 2024-11-13 RX ORDER — LIDOCAINE HYDROCHLORIDE 10 MG/ML
1 INJECTION, SOLUTION EPIDURAL; INFILTRATION; INTRACAUDAL; PERINEURAL ONCE
Status: CANCELLED | OUTPATIENT
Start: 2024-11-13 | End: 2024-11-13

## 2024-11-13 RX ORDER — PROPOFOL 10 MG/ML
VIAL (ML) INTRAVENOUS
Status: DISPENSED
Start: 2024-11-13 | End: 2024-11-13

## 2024-11-13 RX ORDER — PANTOPRAZOLE SODIUM 40 MG/10ML
40 INJECTION, POWDER, LYOPHILIZED, FOR SOLUTION INTRAVENOUS 2 TIMES DAILY
Status: DISCONTINUED | OUTPATIENT
Start: 2024-11-13 | End: 2024-11-16

## 2024-11-13 RX ORDER — SODIUM CHLORIDE, SODIUM GLUCONATE, SODIUM ACETATE, POTASSIUM CHLORIDE AND MAGNESIUM CHLORIDE 30; 37; 368; 526; 502 MG/100ML; MG/100ML; MG/100ML; MG/100ML; MG/100ML
INJECTION, SOLUTION INTRAVENOUS CONTINUOUS
Status: CANCELLED | OUTPATIENT
Start: 2024-11-13 | End: 2024-12-13

## 2024-11-13 RX ADMIN — OXYCODONE AND ACETAMINOPHEN 1 TABLET: 10; 325 TABLET ORAL at 05:11

## 2024-11-13 RX ADMIN — SODIUM CHLORIDE, PRESERVATIVE FREE 10 ML: 5 INJECTION INTRAVENOUS at 12:11

## 2024-11-13 RX ADMIN — HYDROCHLOROTHIAZIDE 25 MG: 25 TABLET ORAL at 01:11

## 2024-11-13 RX ADMIN — PROPOFOL 100 MG: 10 INJECTION, EMULSION INTRAVENOUS at 11:11

## 2024-11-13 RX ADMIN — CARVEDILOL 50 MG: 12.5 TABLET, FILM COATED ORAL at 09:11

## 2024-11-13 RX ADMIN — GABAPENTIN 400 MG: 400 CAPSULE ORAL at 09:11

## 2024-11-13 RX ADMIN — SODIUM SULFATE, POTASSIUM SULFATE, MAGNESIUM SULFATE 177 ML: 17.5; 3.13; 1.6 SOLUTION, CONCENTRATE ORAL at 05:11

## 2024-11-13 RX ADMIN — DOXYCYCLINE HYCLATE 100 MG: 100 TABLET, COATED ORAL at 09:11

## 2024-11-13 RX ADMIN — OXYCODONE AND ACETAMINOPHEN 1 TABLET: 10; 325 TABLET ORAL at 06:11

## 2024-11-13 RX ADMIN — SODIUM CHLORIDE, POTASSIUM CHLORIDE, SODIUM LACTATE AND CALCIUM CHLORIDE: 600; 310; 30; 20 INJECTION, SOLUTION INTRAVENOUS at 07:11

## 2024-11-13 RX ADMIN — HYOSCYAMINE SULFATE 0.12 MG: 0.12 TABLET SUBLINGUAL at 06:11

## 2024-11-13 RX ADMIN — LIDOCAINE HYDROCHLORIDE 50 MG: 20 INJECTION INTRAVENOUS at 11:11

## 2024-11-13 RX ADMIN — SODIUM CHLORIDE, PRESERVATIVE FREE 10 ML: 5 INJECTION INTRAVENOUS at 05:11

## 2024-11-13 RX ADMIN — HYOSCYAMINE SULFATE 0.12 MG: 0.12 TABLET SUBLINGUAL at 02:11

## 2024-11-13 RX ADMIN — GABAPENTIN 400 MG: 400 CAPSULE ORAL at 05:11

## 2024-11-13 RX ADMIN — TRAZODONE HYDROCHLORIDE 200 MG: 100 TABLET ORAL at 09:11

## 2024-11-13 RX ADMIN — OXYCODONE AND ACETAMINOPHEN 1 TABLET: 10; 325 TABLET ORAL at 01:11

## 2024-11-13 RX ADMIN — SENNOSIDES AND DOCUSATE SODIUM 1 TABLET: 50; 8.6 TABLET ORAL at 09:11

## 2024-11-13 RX ADMIN — FLUOXETINE 20 MG: 20 CAPSULE ORAL at 01:11

## 2024-11-13 RX ADMIN — HYOSCYAMINE SULFATE 0.12 MG: 0.12 TABLET SUBLINGUAL at 05:11

## 2024-11-13 RX ADMIN — PANTOPRAZOLE SODIUM 40 MG: 40 INJECTION, POWDER, LYOPHILIZED, FOR SOLUTION INTRAVENOUS at 09:11

## 2024-11-13 RX ADMIN — MORPHINE SULFATE 2 MG: 4 INJECTION, SOLUTION INTRAMUSCULAR; INTRAVENOUS at 09:11

## 2024-11-13 RX ADMIN — OXYBUTYNIN CHLORIDE 5 MG: 5 TABLET ORAL at 09:11

## 2024-11-13 RX ADMIN — MORPHINE SULFATE 2 MG: 4 INJECTION, SOLUTION INTRAMUSCULAR; INTRAVENOUS at 04:11

## 2024-11-13 RX ADMIN — HYOSCYAMINE SULFATE 0.12 MG: 0.12 TABLET SUBLINGUAL at 01:11

## 2024-11-13 RX ADMIN — SODIUM CHLORIDE, POTASSIUM CHLORIDE, SODIUM LACTATE AND CALCIUM CHLORIDE 1000 ML: 600; 310; 30; 20 INJECTION, SOLUTION INTRAVENOUS at 07:11

## 2024-11-13 RX ADMIN — NIFEDIPINE 90 MG: 90 TABLET, FILM COATED, EXTENDED RELEASE ORAL at 01:11

## 2024-11-13 RX ADMIN — GABAPENTIN 400 MG: 400 CAPSULE ORAL at 01:11

## 2024-11-13 RX ADMIN — SODIUM CHLORIDE, PRESERVATIVE FREE 10 ML: 5 INJECTION INTRAVENOUS at 06:11

## 2024-11-13 RX ADMIN — FENOFIBRATE 48 MG: 48 TABLET, FILM COATED ORAL at 01:11

## 2024-11-13 RX ADMIN — ATORVASTATIN CALCIUM 20 MG: 10 TABLET, FILM COATED ORAL at 09:11

## 2024-11-13 RX ADMIN — OXYBUTYNIN CHLORIDE 5 MG: 5 TABLET ORAL at 01:11

## 2024-11-13 RX ADMIN — HYOSCYAMINE SULFATE 0.12 MG: 0.12 TABLET SUBLINGUAL at 09:11

## 2024-11-13 RX ADMIN — SODIUM CHLORIDE, POTASSIUM CHLORIDE, SODIUM LACTATE AND CALCIUM CHLORIDE: 600; 310; 30; 20 INJECTION, SOLUTION INTRAVENOUS at 09:11

## 2024-11-13 RX ADMIN — PROPOFOL 20 MG: 10 INJECTION, EMULSION INTRAVENOUS at 11:11
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
## 2024-11-13 NOTE — NURSING
Pt leaving unit for colonoscopy    Pt returned from Endo.    Daily am meds given, others held for next dose.
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
## 2024-11-13 NOTE — PROVATION PATIENT INSTRUCTIONS
Discharge Summary/Instructions after an Endoscopic Procedure  Patient Name: Bianca Khan  Patient MRN: [id number]  Patient YOB: 1965 Wednesday, November 13, 2024  Thanh Olson MD  Dear patient,  As a result of recent federal legislation (The Federal Cures Act), you may   receive lab or pathology results from your procedure in your [facility]   account before your physician is able to contact you. Your physician or   their representative will relay the results to you with their   recommendations at their soonest availability.  Thank you,  RESTRICTIONS:  During your procedure today, you received medications for sedation.  These   medications may affect your judgment, balance and coordination.  Therefore,   for 24 hours, you have the following restrictions:   - DO NOT drive a car, operate machinery, make legal/financial decisions,   sign important papers or drink alcohol.    ACTIVITY:  Today: no heavy lifting, straining or running due to procedural   sedation/anesthesia.  The following day: return to full activity including work.  DIET:  Eat and drink normally unless instructed otherwise.     TREATMENT FOR COMMON SIDE EFFECTS:  - Mild abdominal pain, nausea, belching, bloating or excessive gas:  rest,   eat lightly and use a heating pad.  - Sore Throat: treat with throat lozenges and/or gargle with warm salt   water.  - Because air was used during the procedure, expelling large amounts of air   from your rectum or belching is normal.  - If a bowel prep was taken, you may not have a bowel movement for 1-3 days.    This is normal.  SYMPTOMS TO WATCH FOR AND REPORT TO YOUR PHYSICIAN:  1. Abdominal pain or bloating, other than gas cramps.  2. Chest pain.  3. Back pain.  4. Signs of infection such as: chills or fever occurring within 24 hours   after the procedure.  5. Rectal bleeding, which would show as bright red, maroon, or black stools.   (A tablespoon of blood from the rectum is not serious,  especially if   hemorrhoids are present.)  6. Vomiting.  7. Weakness or dizziness.  GO DIRECTLY TO THE NEAREST EMERGENCY ROOM IF YOU HAVE ANY OF THE FOLLOWING:      Difficulty breathing              Chills and/or fever over 101 F   Persistent vomiting and/or vomiting blood   Severe abdominal pain   Severe chest pain   Black, tarry stools   Bleeding- more than one tablespoon   Any other symptom or condition that you feel may need urgent attention  Your doctor recommends these additional instructions:  If any biopsies were taken, your doctors clinic will contact you in 1 to 2   weeks with any results.  - Return patient to hospital [facility] for ongoing care.   - Resume previous diet.   - Continue present medications.   - Hold anticoagulation x 1 week  - Avoid hypotension  - Await pathology results.   - Repeat colonoscopy in 3 months to check healing.   - The findings and recommendations were discussed with the surgeon.  For questions, problems or results please call your physician - Thanh Olson MD at Work:  [phone].  [facility] ED at [phone]  IF A COMPLICATION OR EMERGENCY SITUATION ARISES AND YOU ARE UNABLE TO REACH   YOUR PHYSICIAN - GO DIRECTLY TO THE EMERGENCY ROOM.  Thanh Olson MD  11/13/2024 12:12:52 PM  This report has been verified and signed electronically.  Dear patient,  As a result of recent federal legislation (The Federal Cures Act), you may   receive lab or pathology results from your procedure in your [facility]   account before your physician is able to contact you. Your physician or   their representative will relay the results to you with their   recommendations at their soonest availability.  Thank you,  PROVATION

## 2024-11-13 NOTE — CARE UPDATE
[id number] Messaged Felipa again to determine if pt can go to TCU for therapy. Pt is having a colonoscopy today

## 2024-11-13 NOTE — TRANSFER OF CARE
"Anesthesia Transfer of Care Note    Patient: Bianca Khan    Procedure(s) Performed: Procedure(s) (LRB):  COLON (N/A)    Patient location: GI    Anesthesia Type: MAC    Transport from OR: Transported from OR on room air with adequate spontaneous ventilation    Post pain: adequate analgesia    Post assessment: no apparent anesthetic complications    Post vital signs: stable    Level of consciousness: awake    Nausea/Vomiting: no nausea/vomiting    Complications: none    Transfer of care protocol was followed      Last vitals: Visit Vitals  /80   Pulse 100   Temp 36.7 °C (98 °F) (Oral)   Resp 19   Ht 5' 10" (1.778 m)   Wt 79.3 kg (174 lb 13.2 oz)   SpO2 100%   BMI 25.08 kg/m²     "

## 2024-11-13 NOTE — ANESTHESIA POSTPROCEDURE EVALUATION
Anesthesia Post Evaluation    Patient: Bianca Khan    Procedure(s) Performed: Procedure(s) (LRB):  COLON (N/A)    Final Anesthesia Type: general      Patient location during evaluation: PACU  Patient participation: Yes- Able to Participate  Level of consciousness: awake and alert  Post-procedure vital signs: reviewed and stable  Pain management: adequate  Airway patency: patent    PONV status at discharge: No PONV, nausea (controlled) and vomiting (controlled)  Anesthetic complications: no      Cardiovascular status: blood pressure returned to baseline and stable  Respiratory status: unassisted  Hydration status: euvolemic  Follow-up not needed.              Vitals Value Taken Time   /70 11/13/24 1230   Temp 36.2 °C (97.2 °F) 11/13/24 1203   Pulse 93 11/13/24 1233   Resp 15 11/13/24 1233   SpO2 97 % 11/13/24 1233   Vitals shown include unfiled device data.      No case tracking events are documented in the log.      Pain/Adriana Score: Pain Rating Prior to Med Admin: 10 (11/13/2024  5:45 AM)  Pain Rating Post Med Admin: 3 (11/13/2024  6:45 AM)  Adriana Score: 10 (11/13/2024 12:21 PM)
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
## 2024-11-13 NOTE — CARE UPDATE
[id number] On 11/11/24 messaged Felipa with TCU to see if pt can go to their facility for therapy since this is therapy recommendation. Felipa reported her  had denied pt earlier for IV abx. She reported she will check and let me know.

## 2024-11-13 NOTE — PT/OT/SLP PROGRESS
Physical Therapy      Patient Name:  Bianca Khan   MRN:  [id number]    Pt off floor for colonoscopy this AM. Will f/u as schedule permits.     11/13/2024

## 2024-11-13 NOTE — PLAN OF CARE
Received from procedure room:    A-clear airway, able to speak in full sentences, able to expectorate secretions  B-Breathing spontaneously in regular intervals  C-Skin warm to touch, CRT less than 2 seconds    Pain-complains of back pain, appears comfortable, no grimaced face noted    Vital signs taken and kept monitored  Seen by clinician in recovery, can go back eating and drikiong

## 2024-11-13 NOTE — CARE UPDATE
[id number] Provided pt with a list of SNF in case TCU is unable to accept pt. Pass r completed and gave to April SSC

## 2024-11-14 LAB
ABO + RH BLD: NORMAL
ALBUMIN SERPL-MCNC: 1.7 G/DL (ref 3.4–4.8)
ALBUMIN/GLOB SERPL: 0.4 RATIO (ref 1.1–2)
ALP SERPL-CCNC: 78 UNIT/L (ref 40–150)
ALT SERPL-CCNC: 8 UNIT/L (ref 0–55)
ANION GAP SERPL CALC-SCNC: 7 MEQ/L
AST SERPL-CCNC: 24 UNIT/L (ref 5–34)
BILIRUB SERPL-MCNC: 0.6 MG/DL
BLD PROD TYP BPU: NORMAL
BLOOD UNIT EXPIRATION DATE: NORMAL
BLOOD UNIT TYPE CODE: 5100
BUN SERPL-MCNC: 22.3 MG/DL (ref 8.4–25.7)
CALCIUM SERPL-MCNC: 8.4 MG/DL (ref 8.8–10)
CHLORIDE SERPL-SCNC: 103 MMOL/L (ref 98–107)
CO2 SERPL-SCNC: 25 MMOL/L (ref 23–31)
CREAT SERPL-MCNC: 1.36 MG/DL (ref 0.72–1.25)
CREAT/UREA NIT SERPL: 16
CROSSMATCH INTERPRETATION: NORMAL
DISPENSE STATUS: NORMAL
ERYTHROCYTE [DISTWIDTH] IN BLOOD BY AUTOMATED COUNT: 16.3 % (ref 11.5–17)
GFR SERPLBLD CREATININE-BSD FMLA CKD-EPI: 60 ML/MIN/1.73/M2
GLOBULIN SER-MCNC: 3.9 GM/DL (ref 2.4–3.5)
GLUCOSE SERPL-MCNC: 102 MG/DL (ref 82–115)
HCT VFR BLD AUTO: 22.3 % (ref 42–52)
HGB BLD-MCNC: 7.4 G/DL (ref 14–18)
MAGNESIUM SERPL-MCNC: 1.4 MG/DL (ref 1.6–2.6)
MCH RBC QN AUTO: 28.9 PG (ref 27–31)
MCHC RBC AUTO-ENTMCNC: 33.2 G/DL (ref 33–36)
MCV RBC AUTO: 87.1 FL (ref 80–94)
NRBC BLD AUTO-RTO: 0 %
PHOSPHATE SERPL-MCNC: 3 MG/DL (ref 2.3–4.7)
PLATELET # BLD AUTO: 373 X10(3)/MCL (ref 130–400)
PMV BLD AUTO: 9.1 FL (ref 7.4–10.4)
POCT GLUCOSE: 103 MG/DL (ref 70–110)
POTASSIUM SERPL-SCNC: 4 MMOL/L (ref 3.5–5.1)
PROT SERPL-MCNC: 5.6 GM/DL (ref 5.8–7.6)
RBC # BLD AUTO: 2.56 X10(6)/MCL (ref 4.7–6.1)
SODIUM SERPL-SCNC: 135 MMOL/L (ref 136–145)
UNIT NUMBER: NORMAL
WBC # BLD AUTO: 7.51 X10(3)/MCL (ref 4.5–11.5)

## 2024-11-14 PROCEDURE — 11000001 HC ACUTE MED/SURG PRIVATE ROOM

## 2024-11-14 PROCEDURE — 25000003 PHARM REV CODE 250: Performed by: STUDENT IN AN ORGANIZED HEALTH CARE EDUCATION/TRAINING PROGRAM

## 2024-11-14 PROCEDURE — 86923 COMPATIBILITY TEST ELECTRIC: CPT | Performed by: INTERNAL MEDICINE

## 2024-11-14 PROCEDURE — 85027 COMPLETE CBC AUTOMATED: CPT

## 2024-11-14 PROCEDURE — 25000003 PHARM REV CODE 250: Performed by: NURSE PRACTITIONER

## 2024-11-14 PROCEDURE — 63600175 PHARM REV CODE 636 W HCPCS: Performed by: INTERNAL MEDICINE

## 2024-11-14 PROCEDURE — 25000003 PHARM REV CODE 250: Performed by: HOSPITALIST

## 2024-11-14 PROCEDURE — 25000003 PHARM REV CODE 250: Performed by: INTERNAL MEDICINE

## 2024-11-14 PROCEDURE — 27000207 HC ISOLATION

## 2024-11-14 PROCEDURE — 25000003 PHARM REV CODE 250

## 2024-11-14 PROCEDURE — 63600175 PHARM REV CODE 636 W HCPCS

## 2024-11-14 PROCEDURE — 83735 ASSAY OF MAGNESIUM: CPT

## 2024-11-14 PROCEDURE — P9016 RBC LEUKOCYTES REDUCED: HCPCS | Performed by: INTERNAL MEDICINE

## 2024-11-14 PROCEDURE — 36415 COLL VENOUS BLD VENIPUNCTURE: CPT

## 2024-11-14 PROCEDURE — 84100 ASSAY OF PHOSPHORUS: CPT

## 2024-11-14 PROCEDURE — 21400001 HC TELEMETRY ROOM

## 2024-11-14 PROCEDURE — A4216 STERILE WATER/SALINE, 10 ML: HCPCS | Performed by: STUDENT IN AN ORGANIZED HEALTH CARE EDUCATION/TRAINING PROGRAM

## 2024-11-14 PROCEDURE — 63600175 PHARM REV CODE 636 W HCPCS: Performed by: HOSPITALIST

## 2024-11-14 PROCEDURE — 80053 COMPREHEN METABOLIC PANEL: CPT

## 2024-11-14 RX ORDER — MAGNESIUM SULFATE HEPTAHYDRATE 40 MG/ML
2 INJECTION, SOLUTION INTRAVENOUS ONCE
Status: COMPLETED | OUTPATIENT
Start: 2024-11-14 | End: 2024-11-14

## 2024-11-14 RX ORDER — HYDROCODONE BITARTRATE AND ACETAMINOPHEN 500; 5 MG/1; MG/1
TABLET ORAL
Status: DISCONTINUED | OUTPATIENT
Start: 2024-11-14 | End: 2024-11-26 | Stop reason: HOSPADM

## 2024-11-14 RX ADMIN — OXYCODONE AND ACETAMINOPHEN 1 TABLET: 10; 325 TABLET ORAL at 05:11

## 2024-11-14 RX ADMIN — SODIUM CHLORIDE, PRESERVATIVE FREE 10 ML: 5 INJECTION INTRAVENOUS at 01:11

## 2024-11-14 RX ADMIN — SENNOSIDES AND DOCUSATE SODIUM 1 TABLET: 50; 8.6 TABLET ORAL at 07:11

## 2024-11-14 RX ADMIN — OXYCODONE AND ACETAMINOPHEN 1 TABLET: 10; 325 TABLET ORAL at 10:11

## 2024-11-14 RX ADMIN — GABAPENTIN 400 MG: 400 CAPSULE ORAL at 02:11

## 2024-11-14 RX ADMIN — ALUMINUM HYDROXIDE, MAGNESIUM HYDROXIDE, AND SIMETHICONE 30 ML: 1200; 120; 1200 SUSPENSION ORAL at 08:11

## 2024-11-14 RX ADMIN — MAGNESIUM SULFATE HEPTAHYDRATE 2 G: 40 INJECTION, SOLUTION INTRAVENOUS at 04:11

## 2024-11-14 RX ADMIN — OXYBUTYNIN CHLORIDE 5 MG: 5 TABLET ORAL at 09:11

## 2024-11-14 RX ADMIN — HYOSCYAMINE SULFATE 0.12 MG: 0.12 TABLET SUBLINGUAL at 02:11

## 2024-11-14 RX ADMIN — NIFEDIPINE 90 MG: 90 TABLET, FILM COATED, EXTENDED RELEASE ORAL at 07:11

## 2024-11-14 RX ADMIN — CARVEDILOL 50 MG: 12.5 TABLET, FILM COATED ORAL at 07:11

## 2024-11-14 RX ADMIN — FLUOXETINE 20 MG: 20 CAPSULE ORAL at 07:11

## 2024-11-14 RX ADMIN — CARVEDILOL 50 MG: 12.5 TABLET, FILM COATED ORAL at 09:11

## 2024-11-14 RX ADMIN — SODIUM CHLORIDE, PRESERVATIVE FREE 10 ML: 5 INJECTION INTRAVENOUS at 05:11

## 2024-11-14 RX ADMIN — SODIUM CHLORIDE, POTASSIUM CHLORIDE, SODIUM LACTATE AND CALCIUM CHLORIDE: 600; 310; 30; 20 INJECTION, SOLUTION INTRAVENOUS at 06:11

## 2024-11-14 RX ADMIN — HYOSCYAMINE SULFATE 0.12 MG: 0.12 TABLET SUBLINGUAL at 05:11

## 2024-11-14 RX ADMIN — GABAPENTIN 400 MG: 400 CAPSULE ORAL at 09:11

## 2024-11-14 RX ADMIN — SODIUM CHLORIDE, PRESERVATIVE FREE 10 ML: 5 INJECTION INTRAVENOUS at 06:11

## 2024-11-14 RX ADMIN — DOXYCYCLINE HYCLATE 100 MG: 100 TABLET, COATED ORAL at 09:11

## 2024-11-14 RX ADMIN — OXYBUTYNIN CHLORIDE 5 MG: 5 TABLET ORAL at 02:11

## 2024-11-14 RX ADMIN — ATORVASTATIN CALCIUM 20 MG: 10 TABLET, FILM COATED ORAL at 09:11

## 2024-11-14 RX ADMIN — SENNOSIDES AND DOCUSATE SODIUM 1 TABLET: 50; 8.6 TABLET ORAL at 09:11

## 2024-11-14 RX ADMIN — OXYCODONE AND ACETAMINOPHEN 1 TABLET: 10; 325 TABLET ORAL at 06:11

## 2024-11-14 RX ADMIN — OXYCODONE AND ACETAMINOPHEN 1 TABLET: 10; 325 TABLET ORAL at 02:11

## 2024-11-14 RX ADMIN — DOXYCYCLINE HYCLATE 100 MG: 100 TABLET, COATED ORAL at 07:11

## 2024-11-14 RX ADMIN — PANTOPRAZOLE SODIUM 40 MG: 40 INJECTION, POWDER, LYOPHILIZED, FOR SOLUTION INTRAVENOUS at 09:11

## 2024-11-14 RX ADMIN — HYOSCYAMINE SULFATE 0.12 MG: 0.12 TABLET SUBLINGUAL at 01:11

## 2024-11-14 RX ADMIN — SODIUM CHLORIDE, PRESERVATIVE FREE 10 ML: 5 INJECTION INTRAVENOUS at 12:11

## 2024-11-14 RX ADMIN — POLYETHYLENE GLYCOL 3350 17 G: 17 POWDER, FOR SOLUTION ORAL at 08:11

## 2024-11-14 RX ADMIN — HYOSCYAMINE SULFATE 0.12 MG: 0.12 TABLET SUBLINGUAL at 06:11

## 2024-11-14 RX ADMIN — SODIUM CHLORIDE, POTASSIUM CHLORIDE, SODIUM LACTATE AND CALCIUM CHLORIDE: 600; 310; 30; 20 INJECTION, SOLUTION INTRAVENOUS at 08:11

## 2024-11-14 RX ADMIN — PANTOPRAZOLE SODIUM 40 MG: 40 INJECTION, POWDER, LYOPHILIZED, FOR SOLUTION INTRAVENOUS at 07:11

## 2024-11-14 RX ADMIN — FENOFIBRATE 48 MG: 48 TABLET, FILM COATED ORAL at 07:11

## 2024-11-14 RX ADMIN — METHOCARBAMOL 750 MG: 750 TABLET ORAL at 01:11

## 2024-11-14 RX ADMIN — HYOSCYAMINE SULFATE 0.12 MG: 0.12 TABLET SUBLINGUAL at 10:11

## 2024-11-14 RX ADMIN — GABAPENTIN 400 MG: 400 CAPSULE ORAL at 05:11

## 2024-11-14 RX ADMIN — HYDROCHLOROTHIAZIDE 25 MG: 25 TABLET ORAL at 07:11

## 2024-11-14 RX ADMIN — HYOSCYAMINE SULFATE 0.12 MG: 0.12 TABLET SUBLINGUAL at 09:11

## 2024-11-14 RX ADMIN — HYDROXYZINE HYDROCHLORIDE 25 MG: 25 TABLET, FILM COATED ORAL at 12:11

## 2024-11-14 RX ADMIN — OXYBUTYNIN CHLORIDE 5 MG: 5 TABLET ORAL at 07:11

## 2024-11-14 NOTE — PLAN OF CARE
CM follow up to obtain SNF choices in the event TCU doesn't accept patient.  Patient states his wife is coming today and they will make a decision once they review and discuss their options.  Will continue to follow
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
## 2024-11-14 NOTE — PROGRESS NOTES
[facility] - 8th Floor Med Surg  Wound Care    Patient Name:  Bianca Khan   MRN:  [id number]  Date: 2024  Diagnosis: Sacral wound    History:     Past Medical History:   Diagnosis Date    Arthritis     Chronic ulcer of ankle 2022    ESBL (extended spectrum beta-lactamase) producing bacteria infection 2024    Frequent UTI 2019    Generalized anxiety disorder 2022    Neurogenic bladder 2022    Osteomyelitis 2022    Paraplegia     Presence of suprapubic catheter 2022    Pure hypercholesterolemia 2022    Retention of urine, unspecified 2019    Spinal cord injury at T1-T6 level 2018       Social History     Socioeconomic History    Marital status:    Tobacco Use    Smoking status: Some Days     Current packs/day: 0.00     Average packs/day: 0.2 packs/day for 41.5 years (10.4 ttl pk-yrs)     Types: Cigars, Cigarettes     Start date: 1982     Last attempt to quit: 2023     Years since quittin.3    Smokeless tobacco: Never   Substance and Sexual Activity    Alcohol use: Not Currently     Alcohol/week: 2.0 standard drinks of alcohol     Types: 2 Cans of beer per week    Drug use: Not Currently     Types: Oxycodone    Sexual activity: Not Currently     Partners: Female     Birth control/protection: None     Social Drivers of Health     Financial Resource Strain: Low Risk  (10/10/2024)    Overall Financial Resource Strain (CARDIA)     Difficulty of Paying Living Expenses: Not hard at all   Food Insecurity: No Food Insecurity (10/10/2024)    Hunger Vital Sign     Worried About Running Out of Food in the Last Year: Never true     Ran Out of Food in the Last Year: Never true   Transportation Needs: No Transportation Needs (10/10/2024)    TRANSPORTATION NEEDS     Transportation : No   Physical Activity: Inactive (2023)    Exercise Vital Sign     Days of Exercise per Week: 0 days     Minutes of Exercise per Session: 0 min    Stress: No Stress Concern Present (10/10/2024)    [employer] - Occupational Stress Questionnaire     Feeling of Stress : Only a little   Housing Stability: Low Risk  (10/10/2024)    Housing Stability Vital Sign     Unable to Pay for Housing in the Last Year: No     Homeless in the Last Year: No       Precautions:     Allergies as of 10/08/2024 - Reviewed 10/08/2024   Allergen Reaction Noted    Baclofen Itching and Anxiety 06/17/2019       WOC Assessment Details/Treatment        11/14/24 1303   WOCN Assessment   Visit Date 11/14/24   Visit Time 1303   Consult Type Follow Up   WOCN Speciality Ostomy   WOCN List colostomy   Wound surgical   Continence Type Fecal   Ostomy Type Colostomy   Procedure ostomy pouch   Intervention chart review;assessed   Teaching on-going        Colostomy 11/06/24 LUQ   Placement Date: 11/06/24   Inserted by: MD  Location: LUQ   Wound Image     Stomal Appliance 2 piece;Clean;Dry;Intact;No Leakage   Stoma Appearance round;pink;moist   Site Assessment Clean;Intact   Peristomal Assessment ALDO   Accessories/Skin Care wafer barrier over peristomal skin   Stoma Function flatus;stool;thin liquid  (dark red)   Tolerance no signs/symptoms of discomfort     WOCN follow up for colostomy education and care. POD #8. Family at bedside. Explained reason for visit. Pt. Currently getting a unit of PRBC's. Pt. Has a colostomy that was created by general sx on 11/6/2024.  Pt. Has pink, moist, and round stoma with red catheter in place. Dark red/ dark brown liquid stool noted in pouch along w/ flatus. Nursing to cont. Tx recs and preventative measures. Will follow up tomorrow for wound vac change.        11/14/2024

## 2024-11-14 NOTE — PLAN OF CARE
Problem: Adult Inpatient Plan of Care  Goal: Plan of Care Review  Outcome: Progressing  Goal: Patient-Specific Goal (Individualized)  Outcome: Progressing  Goal: Absence of Hospital-Acquired Illness or Injury  Outcome: Progressing  Goal: Optimal Comfort and Wellbeing  Outcome: Progressing  Goal: Readiness for Transition of Care  Outcome: Progressing     Problem: Diabetes Comorbidity  Goal: Blood Glucose Level Within Targeted Range  Outcome: Progressing     Problem: Infection  Goal: Absence of Infection Signs and Symptoms  Outcome: Progressing     Problem: Wound  Goal: Optimal Coping  Outcome: Progressing  Goal: Optimal Functional Ability  Outcome: Progressing  Goal: Absence of Infection Signs and Symptoms  Outcome: Progressing  Goal: Improved Oral Intake  Outcome: Progressing  Goal: Optimal Pain Control and Function  Outcome: Progressing  Goal: Skin Health and Integrity  Outcome: Progressing  Goal: Optimal Wound Healing  Outcome: Progressing     Problem: Skin Injury Risk Increased  Goal: Skin Health and Integrity  Outcome: Progressing     Problem: Pain Acute  Goal: Optimal Pain Control and Function  Outcome: Progressing     Problem: Fall Injury Risk  Goal: Absence of Fall and Fall-Related Injury  Outcome: Progressing      Partial Purse String (Intermediate) Text: Given the location of the defect and the characteristics of the surrounding skin an intermediate purse string closure was deemed most appropriate.  Undermining was performed circumfirentially around the surgical defect.  A purse string suture was then placed and tightened. Wound tension only allowed a partial closure of the circular defect.
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
## 2024-11-14 NOTE — PT/OT/SLP PROGRESS
Physical Therapy      Patient Name:  Bianca Khan   MRN:  [id number]    Patient not seen today secondary to Patient fatigue, Blood transfusion. Will follow-up tomorrow.

## 2024-11-15 LAB
ANION GAP SERPL CALC-SCNC: 6 MEQ/L
BASOPHILS # BLD AUTO: 0.02 X10(3)/MCL
BASOPHILS NFR BLD AUTO: 0.3 %
BUN SERPL-MCNC: 15 MG/DL (ref 8.4–25.7)
CALCIUM SERPL-MCNC: 8.3 MG/DL (ref 8.8–10)
CHLORIDE SERPL-SCNC: 104 MMOL/L (ref 98–107)
CO2 SERPL-SCNC: 26 MMOL/L (ref 23–31)
CREAT SERPL-MCNC: 1.09 MG/DL (ref 0.72–1.25)
CREAT/UREA NIT SERPL: 14
EOSINOPHIL # BLD AUTO: 0.03 X10(3)/MCL (ref 0–0.9)
EOSINOPHIL NFR BLD AUTO: 0.4 %
ERYTHROCYTE [DISTWIDTH] IN BLOOD BY AUTOMATED COUNT: 15.9 % (ref 11.5–17)
GFR SERPLBLD CREATININE-BSD FMLA CKD-EPI: >60 ML/MIN/1.73/M2
GLUCOSE SERPL-MCNC: 88 MG/DL (ref 82–115)
GROUP & RH: NORMAL
HCT VFR BLD AUTO: 26.7 % (ref 42–52)
HGB BLD-MCNC: 9.1 G/DL (ref 14–18)
IMM GRANULOCYTES # BLD AUTO: 0.04 X10(3)/MCL (ref 0–0.04)
IMM GRANULOCYTES NFR BLD AUTO: 0.5 %
INDIRECT COOMBS: NORMAL
LYMPHOCYTES # BLD AUTO: 2.91 X10(3)/MCL (ref 0.6–4.6)
LYMPHOCYTES NFR BLD AUTO: 38 %
MCH RBC QN AUTO: 29.8 PG (ref 27–31)
MCHC RBC AUTO-ENTMCNC: 34.1 G/DL (ref 33–36)
MCV RBC AUTO: 87.5 FL (ref 80–94)
MONOCYTES # BLD AUTO: 1.08 X10(3)/MCL (ref 0.1–1.3)
MONOCYTES NFR BLD AUTO: 14.1 %
NEUTROPHILS # BLD AUTO: 3.57 X10(3)/MCL (ref 2.1–9.2)
NEUTROPHILS NFR BLD AUTO: 46.7 %
NRBC BLD AUTO-RTO: 0 %
PLATELET # BLD AUTO: 383 X10(3)/MCL (ref 130–400)
PMV BLD AUTO: 8.8 FL (ref 7.4–10.4)
POCT GLUCOSE: 85 MG/DL (ref 70–110)
POTASSIUM SERPL-SCNC: 4.1 MMOL/L (ref 3.5–5.1)
PSYCHE PATHOLOGY RESULT: NORMAL
RBC # BLD AUTO: 3.05 X10(6)/MCL (ref 4.7–6.1)
SODIUM SERPL-SCNC: 136 MMOL/L (ref 136–145)
SPECIMEN OUTDATE: NORMAL
WBC # BLD AUTO: 7.65 X10(3)/MCL (ref 4.5–11.5)

## 2024-11-15 PROCEDURE — 63600175 PHARM REV CODE 636 W HCPCS: Performed by: HOSPITALIST

## 2024-11-15 PROCEDURE — 86900 BLOOD TYPING SEROLOGIC ABO: CPT | Performed by: INTERNAL MEDICINE

## 2024-11-15 PROCEDURE — 25000003 PHARM REV CODE 250: Performed by: HOSPITALIST

## 2024-11-15 PROCEDURE — 25000003 PHARM REV CODE 250: Performed by: STUDENT IN AN ORGANIZED HEALTH CARE EDUCATION/TRAINING PROGRAM

## 2024-11-15 PROCEDURE — 25000003 PHARM REV CODE 250: Performed by: INTERNAL MEDICINE

## 2024-11-15 PROCEDURE — 36415 COLL VENOUS BLD VENIPUNCTURE: CPT | Performed by: INTERNAL MEDICINE

## 2024-11-15 PROCEDURE — 25000003 PHARM REV CODE 250

## 2024-11-15 PROCEDURE — 63600175 PHARM REV CODE 636 W HCPCS: Performed by: STUDENT IN AN ORGANIZED HEALTH CARE EDUCATION/TRAINING PROGRAM

## 2024-11-15 PROCEDURE — 85025 COMPLETE CBC W/AUTO DIFF WBC: CPT | Performed by: INTERNAL MEDICINE

## 2024-11-15 PROCEDURE — A4216 STERILE WATER/SALINE, 10 ML: HCPCS | Performed by: STUDENT IN AN ORGANIZED HEALTH CARE EDUCATION/TRAINING PROGRAM

## 2024-11-15 PROCEDURE — 97530 THERAPEUTIC ACTIVITIES: CPT | Mod: CQ

## 2024-11-15 PROCEDURE — 80048 BASIC METABOLIC PNL TOTAL CA: CPT | Performed by: INTERNAL MEDICINE

## 2024-11-15 PROCEDURE — 11000001 HC ACUTE MED/SURG PRIVATE ROOM

## 2024-11-15 PROCEDURE — 27000207 HC ISOLATION

## 2024-11-15 PROCEDURE — 25000003 PHARM REV CODE 250: Performed by: NURSE PRACTITIONER

## 2024-11-15 PROCEDURE — 63600175 PHARM REV CODE 636 W HCPCS

## 2024-11-15 PROCEDURE — 21400001 HC TELEMETRY ROOM

## 2024-11-15 RX ORDER — DOCUSATE SODIUM 100 MG
400 CAPSULE ORAL
Status: DISCONTINUED | OUTPATIENT
Start: 2024-11-15 | End: 2024-11-16

## 2024-11-15 RX ADMIN — Medication 400 ML: at 12:11

## 2024-11-15 RX ADMIN — DOXYCYCLINE HYCLATE 100 MG: 100 TABLET, COATED ORAL at 08:11

## 2024-11-15 RX ADMIN — FLUOXETINE 20 MG: 20 CAPSULE ORAL at 08:11

## 2024-11-15 RX ADMIN — HYDROCHLOROTHIAZIDE 25 MG: 25 TABLET ORAL at 08:11

## 2024-11-15 RX ADMIN — OXYCODONE AND ACETAMINOPHEN 1 TABLET: 10; 325 TABLET ORAL at 06:11

## 2024-11-15 RX ADMIN — POLYETHYLENE GLYCOL 3350 17 G: 17 POWDER, FOR SOLUTION ORAL at 10:11

## 2024-11-15 RX ADMIN — GABAPENTIN 400 MG: 400 CAPSULE ORAL at 02:11

## 2024-11-15 RX ADMIN — HYOSCYAMINE SULFATE 0.12 MG: 0.12 TABLET SUBLINGUAL at 06:11

## 2024-11-15 RX ADMIN — OXYCODONE AND ACETAMINOPHEN 1 TABLET: 10; 325 TABLET ORAL at 03:11

## 2024-11-15 RX ADMIN — HYOSCYAMINE SULFATE 0.12 MG: 0.12 TABLET SUBLINGUAL at 05:11

## 2024-11-15 RX ADMIN — POLYETHYLENE GLYCOL 3350 17 G: 17 POWDER, FOR SOLUTION ORAL at 08:11

## 2024-11-15 RX ADMIN — ATORVASTATIN CALCIUM 20 MG: 10 TABLET, FILM COATED ORAL at 10:11

## 2024-11-15 RX ADMIN — SODIUM CHLORIDE, PRESERVATIVE FREE 10 ML: 5 INJECTION INTRAVENOUS at 06:11

## 2024-11-15 RX ADMIN — HYOSCYAMINE SULFATE 0.12 MG: 0.12 TABLET SUBLINGUAL at 10:11

## 2024-11-15 RX ADMIN — OXYCODONE AND ACETAMINOPHEN 1 TABLET: 10; 325 TABLET ORAL at 02:11

## 2024-11-15 RX ADMIN — Medication 400 ML: at 02:11

## 2024-11-15 RX ADMIN — METHOCARBAMOL 750 MG: 750 TABLET ORAL at 10:11

## 2024-11-15 RX ADMIN — GABAPENTIN 400 MG: 400 CAPSULE ORAL at 05:11

## 2024-11-15 RX ADMIN — CARVEDILOL 50 MG: 12.5 TABLET, FILM COATED ORAL at 08:11

## 2024-11-15 RX ADMIN — SODIUM CHLORIDE, PRESERVATIVE FREE 10 ML: 5 INJECTION INTRAVENOUS at 12:11

## 2024-11-15 RX ADMIN — CARVEDILOL 50 MG: 12.5 TABLET, FILM COATED ORAL at 10:11

## 2024-11-15 RX ADMIN — SODIUM CHLORIDE, PRESERVATIVE FREE 10 ML: 5 INJECTION INTRAVENOUS at 11:11

## 2024-11-15 RX ADMIN — OXYBUTYNIN CHLORIDE 5 MG: 5 TABLET ORAL at 08:11

## 2024-11-15 RX ADMIN — SENNOSIDES AND DOCUSATE SODIUM 1 TABLET: 50; 8.6 TABLET ORAL at 08:11

## 2024-11-15 RX ADMIN — PANTOPRAZOLE SODIUM 40 MG: 40 INJECTION, POWDER, LYOPHILIZED, FOR SOLUTION INTRAVENOUS at 10:11

## 2024-11-15 RX ADMIN — OXYCODONE AND ACETAMINOPHEN 1 TABLET: 10; 325 TABLET ORAL at 10:11

## 2024-11-15 RX ADMIN — SODIUM CHLORIDE, PRESERVATIVE FREE 10 ML: 5 INJECTION INTRAVENOUS at 05:11

## 2024-11-15 RX ADMIN — DOXYCYCLINE HYCLATE 100 MG: 100 TABLET, COATED ORAL at 10:11

## 2024-11-15 RX ADMIN — ONDANSETRON 4 MG: 2 INJECTION INTRAMUSCULAR; INTRAVENOUS at 09:11

## 2024-11-15 RX ADMIN — NIFEDIPINE 90 MG: 90 TABLET, FILM COATED, EXTENDED RELEASE ORAL at 08:11

## 2024-11-15 RX ADMIN — PANTOPRAZOLE SODIUM 40 MG: 40 INJECTION, POWDER, LYOPHILIZED, FOR SOLUTION INTRAVENOUS at 08:11

## 2024-11-15 RX ADMIN — GABAPENTIN 400 MG: 400 CAPSULE ORAL at 10:11

## 2024-11-15 RX ADMIN — HYOSCYAMINE SULFATE 0.12 MG: 0.12 TABLET SUBLINGUAL at 02:11

## 2024-11-15 RX ADMIN — OXYBUTYNIN CHLORIDE 5 MG: 5 TABLET ORAL at 10:11

## 2024-11-15 RX ADMIN — FENOFIBRATE 48 MG: 48 TABLET, FILM COATED ORAL at 08:11

## 2024-11-15 RX ADMIN — HYOSCYAMINE SULFATE 0.12 MG: 0.12 TABLET SUBLINGUAL at 09:11

## 2024-11-15 RX ADMIN — MORPHINE SULFATE 2 MG: 4 INJECTION, SOLUTION INTRAMUSCULAR; INTRAVENOUS at 07:11

## 2024-11-15 RX ADMIN — OXYBUTYNIN CHLORIDE 5 MG: 5 TABLET ORAL at 02:11

## 2024-11-15 RX ADMIN — MORPHINE SULFATE 2 MG: 4 INJECTION, SOLUTION INTRAMUSCULAR; INTRAVENOUS at 10:11

## 2024-11-15 RX ADMIN — SODIUM CHLORIDE, POTASSIUM CHLORIDE, SODIUM LACTATE AND CALCIUM CHLORIDE: 600; 310; 30; 20 INJECTION, SOLUTION INTRAVENOUS at 05:11

## 2024-11-15 RX ADMIN — Medication 400 ML: at 07:11

## 2024-11-15 RX ADMIN — SENNOSIDES AND DOCUSATE SODIUM 1 TABLET: 50; 8.6 TABLET ORAL at 10:11

## 2024-11-15 NOTE — CARE UPDATE
[id number] Sent another message to Fel[facility] and a new message to Inez with [facility] to determine if they can accept pt for therapy.  I received no response. Message Shirin with [facility] who reports both Felipa and Inez are out of the office today. Shirin reports she will look at the referral.  Spoke with pt about other SNF in the area. He reported his sister was going look at some of the places today.

## 2024-11-15 NOTE — CONSULTS
Inpatient Nutrition Assessment    Admit Date: 10/8/2024   Total duration of encounter: 38 days   Patient Age: 59 y.o.    Nutrition Recommendation/Prescription     -Continue 2000 Calorie Diabetic Diet as tolerated. Encourage po intake of meals.    -Continue Malachi BID for wound healing.  -Resume appetite stimulant once medically feasible.   -Monitor wt, labs, and intake.     Communication of Recommendations: reviewed with patient    Nutrition Assessment     Malnutrition Assessment/Nutrition-Focused Physical Exam    Malnutrition Context: acute illness or injury (10/17/24 1218)  Malnutrition Level: moderate (10/17/24 1218)  Energy Intake (Malnutrition):  (family denies) (10/17/24 1218)  Weight Loss (Malnutrition):  (does not meet criteria) (10/17/24 1218)  Subcutaneous Fat (Malnutrition): mild depletion (10/17/24 1218)     Upper Arm Region (Subcutaneous Fat Loss): mild depletion     Muscle Mass (Malnutrition): mild depletion (10/17/24 1218)     Clavicle Bone Region (Muscle Loss): mild depletion                    Fluid Accumulation (Malnutrition): mild (10/17/24 1218)        A minimum of two characteristics is recommended for diagnosis of either severe or non-severe malnutrition.    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 including ESBL E coli, Enterobacter, carbapenem resistant Pseudomonas, Enterococcus faecalis     Scheduled  Medications:  atorvastatin, 20 mg, Nightly  carvediloL, 50 mg, BID  doxycycline, 100 mg, Q12H  electrolytes-dextrose, 400 mL, Q4H  fenofibrate, 48 mg, Daily  FLUoxetine, 20 mg, Daily  gabapentin, 400 mg, Q8H  hydroCHLOROthiazide, 25 mg, Daily  hyoscyamine, 0.125 mg, Q4H  NIFEdipine, 90 mg, Daily  oxybutynin, 5 mg, TID  pantoprazole, 40 mg, BID  polyethylene glycol, 17 g, BID  senna-docusate 8.6-50 mg, 1 tablet, BID  sodium chloride 0.9%, 10 mL, Q6H    Continuous Infusions:       PRN Medications:   0.9%  NaCl infusion (for blood administration), , Q24H PRN  0.9%  NaCl infusion (for blood administration), , Q24H PRN  0.9%  NaCl infusion (for blood administration), , Q24H PRN  0.9%  NaCl infusion (for blood administration), , Q24H PRN  acetaminophen, 650 mg, Q4H PRN  albuterol-ipratropium, 3 mL, Q4H PRN  aluminum-magnesium hydroxide-simethicone, 30 mL, QID PRN  dextrose 10%, 12.5 g, PRN  dextrose 10%, 25 g, PRN  diphenhydrAMINE, 50 mg, Q6H PRN  etomidate, , Code/trauma/sedation Med  glucagon (human recombinant), 1 mg, PRN  glucagon (human recombinant), 1 mg, PRN  glucose, 16 g, PRN  glucose, 24 g, PRN  guaiFENesin 100 mg/5 ml, 200 mg, Q4H PRN  hydrALAZINE, 20 mg, Q4H PRN  hydrOXYzine HCL, 25 mg, BID PRN  insulin aspart U-100, 1-10 Units, QID (AC + HS) PRN  methocarbamoL, 750 mg, TID PRN  morphine, 2 mg, BID PRN  ondansetron, 4 mg, Q4H PRN  oxyCODONE-acetaminophen, 1 tablet, Q4H PRN  prochlorperazine, 5 mg, Q6H PRN  rocuronium, , Code/trauma/sedation Med  sodium chloride 0.9%, 10 mL, PRN  sodium chloride 0.9%, 10 mL, PRN  traZODone, 200 mg, Nightly PRN    Calorie Containing IV Medications: no significant kcals from medications at this time    Recent Labs   Lab 11/10/24  0711 11/11/24  0445 11/11/24  2257 11/12/24  0605 11/12/24  1015 11/12/24  1753 11/13/24  0046 11/13/24  2154 11/14/24  0527 11/15/24  0657    136  --  136  --   --   --   --  135* 136   K 3.9 4.2  --  4.4  --   --   --   --  4.0 4.1   CALCIUM 8.8  8.4*  --  8.6*  --   --   --   --  8.4* 8.3*   PHOS  --   --   --   --   --   --   --   --  3.0  --    MG  --   --   --   --   --   --   --   --  1.40*  --     106  --  104  --   --   --   --  103 104   CO2 24 22*  --  23  --   --   --   --  25 26   BUN 18.8 18.9  --  21.3  --   --   --   --  22.3 15.0   CREATININE 1.28* 1.25  --  1.25  --   --   --   --  1.36* 1.09   EGFRNORACEVR >60 >60  --  >60  --   --   --   --  60 >60   GLUCOSE 76* 78*  --  95  --   --   --   --  102 88   BILITOT  --   --   --  0.9  --   --   --   --  0.6  --    ALKPHOS  --   --   --  114  --   --   --   --  78  --    ALT  --   --   --  7  --   --   --   --  8  --    AST  --   --   --  26  --   --   --   --  24  --    ALBUMIN  --   --   --  1.9*  --   --   --   --  1.7*  --    WBC 7.65 7.83  --  7.19 7.72 9.84 9.87 7.95 7.51 7.65   HGB 10.6* 10.8*   < > 9.3* 8.3* 7.0* 9.0* 7.2* 7.4* 9.1*   HCT 32.1* 32.9*   < > 29.1* 26.1* 21.5* 27.0* 21.6* 22.3* 26.7*    < > = values in this interval not displayed.     Nutrition Orders:  Diet Consistent Carbohydrate 2000 Calories (up to 75 gm per meal)  Dietary nutrition supplements BID; Malachi - Fruit Punch    Appetite/Oral Intake: poor/25-50% of meals  Factors Affecting Nutritional Intake: decreased appetite  Social Needs Impacting Access to Food: none identified  Food/Latter day/Cultural Preferences: none reported  Food Allergies: none reported  Last Bowel Movement: 11/13/24  Wound(s):     Altered Skin Integrity 06/28/23 2230 Right lateral Ankle #6-Tissue loss description: Full thickness       Wound 05/30/24 0000 Pressure Injury Right Buttocks-Tissue loss description: Full thickness       Altered Skin Integrity 01/09/24 0848 upper Sacral spine-Tissue loss description: Full thickness       Wound 08/22/24 0800 Other (comment) Left Heel-Tissue loss description: Full thickness    Comments    10/10: Pt was in surgery at time of visit. Having wound debridement with wound vac placement on Stage 4 wound of rt  "gluteus. Will add Malachi to assist with wound healing. Recommend vitamin regimen for wounds.     10/17/24 Patient transferred to ICU, on ventilator currently, no propofol. Spoke with patient's wife at bedside who reports patient was eating well with a good appetite prior to ICU transfer, she reports bringing him food from home, good intake of Malachi. Tube feeding recommendation provided.    10/18/24 Consult received for tube feeding, will order.    10/22/24: Per RN, pt did not want to really eat earlier; family present in the room and encouraging intake.     10/24/24: Per pt's family member, pt ate ~ half of breakfast. Pt denies n/v; would like a strawberry flavored oral supplement.     10/29/24: Pt still with decreased appetite; denies n/v; taking his oral supplements.     24: Pt with fair intake, eating a lot of outside foods; now on appetite stimulant; taking his Malachi; would only like Boost VHC once daily.     24: Per RN, taking in some; still on appetite stimulant.     24: Pt had colostomy creation yesterday; diet just advanced to clears.     24: Pt on regular diet and tolerating but not taking much in, less than half of his meals. Pt is just not hungry; denies n/v; states that he is taking the Malachi but not drinking the Boost and would like to cancel the Boost.     11/15/24: Pt still with poor appetite/intake; denies n/v; eating some outside foods being brought in by family; taking the Malachi.     Anthropometrics    Height: 5' 10" (177.8 cm), Height Method: Stated  Last Weight: 79.3 kg (174 lb 13.2 oz) (10/17/24 1212), Weight Method: Bed Scale  BMI (Calculated): 25.1  BMI Classification: normal (BMI 18.5-24.9)        Ideal Body Weight (IBW), Male: 166 lb     % Ideal Body Weight, Male (lb): 96.95 %                 Usual Body Weight (UBW), k.57 kg-77.11 kg  % Usual Body Weight: 109.5  % Weight Change From Usual Weight: 9.27 %  Usual Weight Provided By: family/caregiver    Wt Readings from " Last 5 Encounters:   10/17/24 79.3 kg (174 lb 13.2 oz)   08/27/24 63.5 kg (139 lb 15.9 oz)   06/04/24 71.2 kg (156 lb 15.5 oz)   01/30/24 78.5 kg (173 lb 1 oz)   01/10/24 78.5 kg (173 lb 1 oz)     Weight Change(s) Since Admission:   (10/17) took bed weight 79.3 kg during rounds (estimated 4 kg subtracted for equipment), increase noted  Wt Readings from Last 1 Encounters:   10/17/24 1212 79.3 kg (174 lb 13.2 oz)   10/09/24 0430 73 kg (160 lb 15 oz)   10/08/24 1719 63.5 kg (140 lb)   Admit Weight: 63.5 kg (140 lb) (10/08/24 1719), Weight Method: Stated  11/11/24: no new wt noted    Estimated Needs    Weight Used For Calorie Calculations: 79.3 kg (174 lb 13.2 oz)  Energy Calorie Requirements (kcal): 1983-2379 kcal (25-30 kcal/kg)  Energy Need Method: Kcal/kg  Weight Used For Protein Calculations: 79.3 kg (174 lb 13.2 oz)  Protein Requirements: 95 gm (1.2g/kg)  Fluid Requirements (mL): 1983 mL  CHO Requirement: 262-315 gm (45% EEN)  Last Updated: 10/22    Enteral Nutrition Patient not receiving enteral nutrition at this time.    Parenteral Nutrition Patient not receiving parenteral nutrition support at this time.    Evaluation of Received Nutrient Intake    Calories: not meeting estimated needs  Protein: not meeting estimated needs    Patient Education Not applicable.    Nutrition Diagnosis     PES: Inadequate energy intake related to inability to consume sufficient nutrients as evidenced by less than 80% needs met. (active)  PES: Moderate acute disease or injury related malnutrition related to acute illness as evidenced by mild fat depletion, mild muscle depletion, and edema. (active)    Nutrition Interventions     Intervention(s): modified composition of enteral nutrition, modified rate of enteral nutrition, and collaboration with other providers  Goal: Meet greater than 80% of nutritional needs by follow-up. (goal progressing)  Goal: Tolerate enteral feeding at goal rate by follow-up. (goal discontinued)    Nutrition  Goals & Monitoring     Dietitian will monitor: food and beverage intake, energy intake, weight, and electrolyte/renal panel  Discharge planning:  Diabetic Diet with Malachi BID   Nutrition Risk/Follow-Up: high (follow-up in 1-4 days)   Please consult if re-assessment needed sooner.

## 2024-11-15 NOTE — PROGRESS NOTES
[facility] - 8th Floor Med Surg  Wound Care    Patient Name:  Bianca Khan   MRN:  [id number]  Date: 11/15/2024  Diagnosis: Sacral wound    History:     Past Medical History:   Diagnosis Date    Arthritis     Chronic ulcer of ankle 2022    ESBL (extended spectrum beta-lactamase) producing bacteria infection 2024    Frequent UTI 2019    Generalized anxiety disorder 2022    Neurogenic bladder 2022    Osteomyelitis 2022    Paraplegia     Presence of suprapubic catheter 2022    Pure hypercholesterolemia 2022    Retention of urine, unspecified 2019    Spinal cord injury at T1-T6 level 2018       Social History     Socioeconomic History    Marital status:    Tobacco Use    Smoking status: Some Days     Current packs/day: 0.00     Average packs/day: 0.2 packs/day for 41.5 years (10.4 ttl pk-yrs)     Types: Cigars, Cigarettes     Start date: 1982     Last attempt to quit: 2023     Years since quittin.3    Smokeless tobacco: Never   Substance and Sexual Activity    Alcohol use: Not Currently     Alcohol/week: 2.0 standard drinks of alcohol     Types: 2 Cans of beer per week    Drug use: Not Currently     Types: Oxycodone    Sexual activity: Not Currently     Partners: Female     Birth control/protection: None     Social Drivers of Health     Financial Resource Strain: Low Risk  (10/10/2024)    Overall Financial Resource Strain (CARDIA)     Difficulty of Paying Living Expenses: Not hard at all   Food Insecurity: No Food Insecurity (10/10/2024)    Hunger Vital Sign     Worried About Running Out of Food in the Last Year: Never true     Ran Out of Food in the Last Year: Never true   Transportation Needs: No Transportation Needs (10/10/2024)    TRANSPORTATION NEEDS     Transportation : No   Physical Activity: Inactive (2023)    Exercise Vital Sign     Days of Exercise per Week: 0 days     Minutes of Exercise per Session: 0 min    Stress: No Stress Concern Present (10/10/2024)    [employer] [employer] - Occupational Stress Questionnaire     Feeling of Stress : Only a little   Housing Stability: Low Risk  (10/10/2024)    Housing Stability Vital Sign     Unable to Pay for Housing in the Last Year: No     Homeless in the Last Year: No       Precautions:     Allergies as of 10/08/2024 - Reviewed 10/08/2024   Allergen Reaction Noted    Baclofen Itching and Anxiety 06/17/2019       WO Assessment Details/Treatment        11/15/24 1050   WOCN Assessment   Visit Date 11/15/24   Visit Time 1050   Consult Type Follow Up   WOCN Speciality Ostomy   WOCN List colostomy   Wound surgical   Continence Type Fecal   Ostomy Type Colostomy   Procedure ostomy pouch   Intervention chart review;assessed;orders   Teaching on-going        Altered Skin Integrity 06/28/23 2230 Right lateral Ankle #6   Date First Assessed/Time First Assessed: 06/28/23 2230   Altered Skin Integrity Present on Admission - Did Patient arrive to the hospital with altered skin?: yes  Side: Right  Orientation: lateral  Location: Ankle  Wound Number: #6  Is this injury dev...   Wound Image    Description of Altered Skin Integrity Full thickness tissue loss. Subcutaneous fat may be visible but bone, tendon or muscle are not exposed   Dressing Appearance Dry;Intact;Clean   Drainage Amount Scant   Drainage Characteristics/Odor Creamy   Appearance Yellow;Pink   Tissue loss description Full thickness   Black (%), Wound Tissue Color 0 %   Red (%), Wound Tissue Color 50 %   Yellow (%), Wound Tissue Color 50 %   Periwound Area Intact;Pale white   Wound Edges Callused   Wound Length (cm) 1 cm   Wound Width (cm) 0.7 cm   Wound Depth (cm) 0.1 cm   Wound Volume (cm^3) 0.07 cm^3   Wound Surface Area (cm^2) 0.7 cm^2   Care Cleansed with:;Wound cleanser   Dressing Changed;Calcium alginate;Silver;Foam        Altered Skin Integrity 01/09/24 0848 upper Sacral spine   Date First  Assessed/Time First Assessed: 01/09/24 0848   Altered Skin Integrity Present on Admission - Did Patient arrive to the hospital with altered skin?: suspected hospital acquired  Orientation: upper  Location: Sacral spine   Wound Image    Description of Altered Skin Integrity Full thickness tissue loss. Subcutaneous fat may be visible but bone, tendon or muscle are not exposed   Dressing Appearance Intact   Drainage Amount Small   Drainage Characteristics/Odor Serosanguineous   Appearance Pink;Red;Yellow   Tissue loss description Full thickness   Black (%), Wound Tissue Color 0 %   Red (%), Wound Tissue Color 80 %   Yellow (%), Wound Tissue Color 20 %   Periwound Area Intact;Pale white;Pink;Scar tissue   Wound Edges Defined   Wound Length (cm) 6 cm   Wound Width (cm) 6.5 cm   Wound Depth (cm) 1 cm   Wound Volume (cm^3) 39 cm^3   Wound Surface Area (cm^2) 39 cm^2   Care Cleansed with:;Wound cleanser  (vashe)   Dressing Removed;Applied  (NPWT set @125mmHg. 1 versatel under one black foam bridged to right buttock wound)   Dressing Change Due 11/19/24        Wound 05/30/24 0000 Pressure Injury Right Buttocks   Date First Assessed/Time First Assessed: 05/30/24 0000   Primary Wound Type: Pressure Injury  Side: Right  Location: Buttocks  Is this injury device related?: No   Wound Image    Pressure Injury Stage U   Dressing Appearance Dry;Intact;Clean   Drainage Amount None   Drainage Characteristics/Odor Serosanguineous   Appearance Pink;Red;Yellow   Tissue loss description Full thickness   Black (%), Wound Tissue Color 0 %   Red (%), Wound Tissue Color 60 %   Yellow (%), Wound Tissue Color 40 %   Periwound Area Macerated;Pale white;Pink;Scar tissue   Wound Edges Defined   Wound Length (cm) 5 cm   Wound Width (cm) 5.5 cm   Wound Depth (cm) 2 cm   Wound Volume (cm^3) 55 cm^3   Wound Surface Area (cm^2) 27.5 cm^2   Care Cleansed with:;Wound cleanser  (vashe)   Dressing Removed;Applied  (NPWT set @125; 1 white foam, 1 black  foam)   Dressing Change Due 11/19/24        Wound 08/22/24 0800 Other (comment) Left Heel   Date First Assessed/Time First Assessed: 08/22/24 0800   Primary Wound Type: Other (comment)  Side: Left  Location: Heel   Wound Image    Dressing Appearance Intact;Moist drainage   Drainage Amount Small   Drainage Characteristics/Odor Creamy   Appearance Pink;Red   Tissue loss description Full thickness   Black (%), Wound Tissue Color 0 %   Red (%), Wound Tissue Color 100 %   Yellow (%), Wound Tissue Color 0 %   Periwound Area Dry;Pale white   Wound Edges Callused   Wound Length (cm) 3 cm   Wound Width (cm) 3 cm   Wound Depth (cm) 0.5 cm   Wound Volume (cm^3) 4.5 cm^3   Wound Surface Area (cm^2) 9 cm^2   Care Cleansed with:;Wound cleanser   Dressing Applied;Calcium alginate;Silver;Foam        Wound 11/15/24 0800 Moisture associated dermatitis Left posterior Greater trochanter   Date First Assessed/Time First Assessed: 11/15/24 0800   Present on Original Admission: No  Primary Wound Type: Moisture associated dermatitis  Side: Left  Orientation: posterior  Location: Greater trochanter   Wound Image    Dressing Appearance Open to air   Drainage Amount None   Drainage Characteristics/Odor No odor   Appearance Pink;Red;Yellow   Tissue loss description Full thickness   Black (%), Wound Tissue Color 0 %   Red (%), Wound Tissue Color 90 %   Yellow (%), Wound Tissue Color 10 %   Periwound Area Intact;Dry;Scar tissue   Wound Edges Defined   Wound Length (cm) 2 cm   Wound Width (cm) 2.5 cm   Wound Depth (cm) 0.1 cm   Wound Volume (cm^3) 0.5 cm^3   Wound Surface Area (cm^2) 5 cm^2   Care Cleansed with:;Wound cleanser  (vashe)   Dressing Applied;Calcium alginate;Silver;Gauze        Negative Pressure Wound Therapy  10/10/24 1017   Placement Date/Time: 10/10/24 1017   Location: Buttocks  Additional Comments: : [id number]   NPWT Type Vacuum Therapy   Therapy Setting NPWT Continuous therapy   Pressure Setting NPWT 125 mmHg   Therapy  Interventions NPWT Seal intact;Dressing changed   Sponges Inserted NPWT 1;White;Black   Sponges Removed NPWT 1;White        Colostomy 11/06/24 LUQ   Placement Date: 11/06/24   Inserted by: MD  Location: LUQ   Wound Image    Stomal Appliance Changed;2 piece;Clean;Dry;Intact;No Leakage   Stoma Appearance round;red;moist   Site Assessment Clean;Intact   Peristomal Assessment Clean;Intact;Dry   Accessories/Skin Care cleansed w/ soap and water;skin barrier ring;wafer barrier over peristomal skin   Stoma Function flatus;thin liquid   Tolerance no signs/symptoms of discomfort     WOCN follow up for colostomy education and care. POD #9. Discussed POC w/ nurse Fidelia. Family at bedside. Explained reason for visit. Pt. Had colostomy created by general sx on 11/6/2024.  Pt. Has red, moist, and round stoma. Pt. Is having flatus and brown stool from colostomy. Ostomy stoma cleaned w/ remedy cleanser, stefan ring applied, new barrier, and pouch applied. Will order more Ostomy supplies to pt.'s room.   WOCN follow up for removal and re-application of wound vac to right buttock and sacral wound along with follow up for Left heel and right lateral ankle. Had assistance from Jaime and Cesia from wound care team w/ wound vac placement. Wound vac application change to right buttock- 1 black foam, 1 white foam removed. Cleaned wound w/ vashe, 1 white foam, 1 black foam tracked to another black foam leading to sacral wound-1 piece of versatel and 1 black foam with seal intact w/ no leakage or blockage detected w/ setting at 125mmHg. Cont. Tx recs in orders for left heel and right lateral ankle. Treatment recommendations for left heel: Clean w/ vashe, dry well, apply Ca+ Ag to red/good tissue wound bed, abd pad or foam. BID/Prn if soilage. Right lateral ankle: clean w/ vashe, dry well, apply Ca+ Ag cloth to wound bed, gauze or foam. BID/PRN if soilage. Treatment recs: Left post upper thigh: Clean w/ vashe, dry well, apply Ca+ Ag cloth to  wound bed, gauze, tape. BID/PRN if soilage.  Nursing to cont. Tx recs and preventative measures. Will follow up Monday    11/15/2024
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
## 2024-11-15 NOTE — PT/OT/SLP PROGRESS
Physical Therapy      Patient Name:  Bianca Khan   MRN:  [id number]    Decreasing PT POC from 5x to 3x/wk after PT/PTA conference due to pt's current functional status and limited potential to progress 2/2 ongoing medical dx.    11/15/2024
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
## 2024-11-16 LAB
ANION GAP SERPL CALC-SCNC: 6 MEQ/L
BASOPHILS # BLD AUTO: 0.01 X10(3)/MCL
BASOPHILS NFR BLD AUTO: 0.1 %
BUN SERPL-MCNC: 16 MG/DL (ref 8.4–25.7)
CALCIUM SERPL-MCNC: 8.3 MG/DL (ref 8.8–10)
CHLORIDE SERPL-SCNC: 102 MMOL/L (ref 98–107)
CO2 SERPL-SCNC: 25 MMOL/L (ref 23–31)
CREAT SERPL-MCNC: 1.15 MG/DL (ref 0.72–1.25)
CREAT/UREA NIT SERPL: 14
EOSINOPHIL # BLD AUTO: 0.06 X10(3)/MCL (ref 0–0.9)
EOSINOPHIL NFR BLD AUTO: 0.7 %
ERYTHROCYTE [DISTWIDTH] IN BLOOD BY AUTOMATED COUNT: 16 % (ref 11.5–17)
GFR SERPLBLD CREATININE-BSD FMLA CKD-EPI: >60 ML/MIN/1.73/M2
GLUCOSE SERPL-MCNC: 88 MG/DL (ref 82–115)
HCT VFR BLD AUTO: 29.8 % (ref 42–52)
HGB BLD-MCNC: 10 G/DL (ref 14–18)
IMM GRANULOCYTES # BLD AUTO: 0.05 X10(3)/MCL (ref 0–0.04)
IMM GRANULOCYTES NFR BLD AUTO: 0.6 %
LYMPHOCYTES # BLD AUTO: 3.05 X10(3)/MCL (ref 0.6–4.6)
LYMPHOCYTES NFR BLD AUTO: 34.4 %
MCH RBC QN AUTO: 29.3 PG (ref 27–31)
MCHC RBC AUTO-ENTMCNC: 33.6 G/DL (ref 33–36)
MCV RBC AUTO: 87.4 FL (ref 80–94)
MONOCYTES # BLD AUTO: 0.9 X10(3)/MCL (ref 0.1–1.3)
MONOCYTES NFR BLD AUTO: 10.1 %
NEUTROPHILS # BLD AUTO: 4.8 X10(3)/MCL (ref 2.1–9.2)
NEUTROPHILS NFR BLD AUTO: 54.1 %
NRBC BLD AUTO-RTO: 0 %
PLATELET # BLD AUTO: 453 X10(3)/MCL (ref 130–400)
PMV BLD AUTO: 9 FL (ref 7.4–10.4)
POCT GLUCOSE: 89 MG/DL (ref 70–110)
POTASSIUM SERPL-SCNC: 4.3 MMOL/L (ref 3.5–5.1)
RBC # BLD AUTO: 3.41 X10(6)/MCL (ref 4.7–6.1)
SODIUM SERPL-SCNC: 133 MMOL/L (ref 136–145)
WBC # BLD AUTO: 8.87 X10(3)/MCL (ref 4.5–11.5)

## 2024-11-16 PROCEDURE — 25000003 PHARM REV CODE 250: Performed by: INTERNAL MEDICINE

## 2024-11-16 PROCEDURE — 25000003 PHARM REV CODE 250: Performed by: STUDENT IN AN ORGANIZED HEALTH CARE EDUCATION/TRAINING PROGRAM

## 2024-11-16 PROCEDURE — 27000207 HC ISOLATION

## 2024-11-16 PROCEDURE — 11000001 HC ACUTE MED/SURG PRIVATE ROOM

## 2024-11-16 PROCEDURE — 36415 COLL VENOUS BLD VENIPUNCTURE: CPT | Performed by: HOSPITALIST

## 2024-11-16 PROCEDURE — 25000003 PHARM REV CODE 250

## 2024-11-16 PROCEDURE — 25000003 PHARM REV CODE 250: Performed by: NURSE PRACTITIONER

## 2024-11-16 PROCEDURE — 80048 BASIC METABOLIC PNL TOTAL CA: CPT | Performed by: HOSPITALIST

## 2024-11-16 PROCEDURE — 63600175 PHARM REV CODE 636 W HCPCS: Performed by: INTERNAL MEDICINE

## 2024-11-16 PROCEDURE — A4216 STERILE WATER/SALINE, 10 ML: HCPCS | Performed by: STUDENT IN AN ORGANIZED HEALTH CARE EDUCATION/TRAINING PROGRAM

## 2024-11-16 PROCEDURE — 85025 COMPLETE CBC W/AUTO DIFF WBC: CPT | Performed by: HOSPITALIST

## 2024-11-16 PROCEDURE — 25000003 PHARM REV CODE 250: Performed by: HOSPITALIST

## 2024-11-16 PROCEDURE — 21400001 HC TELEMETRY ROOM

## 2024-11-16 RX ORDER — DOCUSATE SODIUM 100 MG
400 CAPSULE ORAL
Status: DISCONTINUED | OUTPATIENT
Start: 2024-11-16 | End: 2024-11-26 | Stop reason: HOSPADM

## 2024-11-16 RX ORDER — PANTOPRAZOLE SODIUM 40 MG/1
40 TABLET, DELAYED RELEASE ORAL
Status: DISCONTINUED | OUTPATIENT
Start: 2024-11-16 | End: 2024-11-26 | Stop reason: HOSPADM

## 2024-11-16 RX ORDER — AMOXICILLIN 250 MG
2 CAPSULE ORAL 2 TIMES DAILY
Status: DISCONTINUED | OUTPATIENT
Start: 2024-11-16 | End: 2024-11-26 | Stop reason: HOSPADM

## 2024-11-16 RX ADMIN — HYDROCHLOROTHIAZIDE 25 MG: 25 TABLET ORAL at 08:11

## 2024-11-16 RX ADMIN — OXYCODONE AND ACETAMINOPHEN 1 TABLET: 10; 325 TABLET ORAL at 01:11

## 2024-11-16 RX ADMIN — OXYBUTYNIN CHLORIDE 5 MG: 5 TABLET ORAL at 03:11

## 2024-11-16 RX ADMIN — OXYCODONE AND ACETAMINOPHEN 1 TABLET: 10; 325 TABLET ORAL at 06:11

## 2024-11-16 RX ADMIN — HYOSCYAMINE SULFATE 0.12 MG: 0.12 TABLET SUBLINGUAL at 01:11

## 2024-11-16 RX ADMIN — OXYCODONE AND ACETAMINOPHEN 1 TABLET: 10; 325 TABLET ORAL at 11:11

## 2024-11-16 RX ADMIN — ATORVASTATIN CALCIUM 20 MG: 10 TABLET, FILM COATED ORAL at 09:11

## 2024-11-16 RX ADMIN — OXYBUTYNIN CHLORIDE 5 MG: 5 TABLET ORAL at 09:11

## 2024-11-16 RX ADMIN — POLYETHYLENE GLYCOL 3350 17 G: 17 POWDER, FOR SOLUTION ORAL at 09:11

## 2024-11-16 RX ADMIN — DOXYCYCLINE HYCLATE 100 MG: 100 TABLET, COATED ORAL at 08:11

## 2024-11-16 RX ADMIN — PANTOPRAZOLE SODIUM 40 MG: 40 TABLET, DELAYED RELEASE ORAL at 03:11

## 2024-11-16 RX ADMIN — TRAZODONE HYDROCHLORIDE 200 MG: 100 TABLET ORAL at 09:11

## 2024-11-16 RX ADMIN — GABAPENTIN 400 MG: 400 CAPSULE ORAL at 06:11

## 2024-11-16 RX ADMIN — FLUOXETINE 20 MG: 20 CAPSULE ORAL at 08:11

## 2024-11-16 RX ADMIN — CARVEDILOL 50 MG: 12.5 TABLET, FILM COATED ORAL at 09:11

## 2024-11-16 RX ADMIN — SODIUM CHLORIDE, PRESERVATIVE FREE 10 ML: 5 INJECTION INTRAVENOUS at 06:11

## 2024-11-16 RX ADMIN — FENOFIBRATE 48 MG: 48 TABLET, FILM COATED ORAL at 08:11

## 2024-11-16 RX ADMIN — SODIUM CHLORIDE, PRESERVATIVE FREE 10 ML: 5 INJECTION INTRAVENOUS at 11:11

## 2024-11-16 RX ADMIN — HYDRALAZINE HYDROCHLORIDE 20 MG: 20 INJECTION INTRAMUSCULAR; INTRAVENOUS at 01:11

## 2024-11-16 RX ADMIN — OXYBUTYNIN CHLORIDE 5 MG: 5 TABLET ORAL at 08:11

## 2024-11-16 RX ADMIN — METHOCARBAMOL 750 MG: 750 TABLET ORAL at 09:11

## 2024-11-16 RX ADMIN — HYOSCYAMINE SULFATE 0.12 MG: 0.12 TABLET SUBLINGUAL at 08:11

## 2024-11-16 RX ADMIN — SENNOSIDES AND DOCUSATE SODIUM 2 TABLET: 50; 8.6 TABLET ORAL at 09:11

## 2024-11-16 RX ADMIN — SODIUM CHLORIDE, PRESERVATIVE FREE 10 ML: 5 INJECTION INTRAVENOUS at 05:11

## 2024-11-16 RX ADMIN — POLYETHYLENE GLYCOL 3350 17 G: 17 POWDER, FOR SOLUTION ORAL at 08:11

## 2024-11-16 RX ADMIN — GABAPENTIN 400 MG: 400 CAPSULE ORAL at 09:11

## 2024-11-16 RX ADMIN — Medication 400 ML: at 03:11

## 2024-11-16 RX ADMIN — SODIUM CHLORIDE, PRESERVATIVE FREE 10 ML: 5 INJECTION INTRAVENOUS at 01:11

## 2024-11-16 RX ADMIN — HYOSCYAMINE SULFATE 0.12 MG: 0.12 TABLET SUBLINGUAL at 09:11

## 2024-11-16 RX ADMIN — Medication 400 ML: at 06:11

## 2024-11-16 RX ADMIN — DOXYCYCLINE HYCLATE 100 MG: 100 TABLET, COATED ORAL at 09:11

## 2024-11-16 RX ADMIN — PANTOPRAZOLE SODIUM 40 MG: 40 TABLET, DELAYED RELEASE ORAL at 08:11

## 2024-11-16 RX ADMIN — GABAPENTIN 400 MG: 400 CAPSULE ORAL at 01:11

## 2024-11-16 RX ADMIN — HYOSCYAMINE SULFATE 0.12 MG: 0.12 TABLET SUBLINGUAL at 06:11

## 2024-11-16 RX ADMIN — HYOSCYAMINE SULFATE 0.12 MG: 0.12 TABLET SUBLINGUAL at 05:11

## 2024-11-16 RX ADMIN — NIFEDIPINE 90 MG: 90 TABLET, FILM COATED, EXTENDED RELEASE ORAL at 08:11

## 2024-11-16 RX ADMIN — SENNOSIDES AND DOCUSATE SODIUM 2 TABLET: 50; 8.6 TABLET ORAL at 08:11

## 2024-11-16 RX ADMIN — CARVEDILOL 50 MG: 12.5 TABLET, FILM COATED ORAL at 08:11

## 2024-11-16 NOTE — PROGRESS NOTES
"Gastroenterology Progress Note    Subjective/Interval History:  No overt bleeding, no passage of fluid or solids per ostomy    ROS:  ROS    Vital Signs:  /79   Pulse 80   Temp 97.9 °F (36.6 °C) (Oral)   Resp 18   Ht 5' 10" (1.778 m)   Wt 79.3 kg (174 lb 13.2 oz)   SpO2 100%   BMI 25.08 kg/m²   Body mass index is 25.08 kg/m².    Physical Exam:  Physical Exam  Abdominal:      Comments: Midline-clean dry and intact; ostomy left lower quadrant with air, no fluid or solids.  Abdomen mild distention, soft nontender         Labs:  Recent Results (from the past 48 hours)   POCT glucose    Collection Time: 11/14/24  4:04 PM   Result Value Ref Range    POCT Glucose 103 70 - 110 mg/dL   Type & Screen    Collection Time: 11/15/24  6:57 AM   Result Value Ref Range    Group & Rh O POS     Indirect Elizabeth GEL NEG     Specimen Outdate 11/18/2024 23:59    Basic Metabolic Panel    Collection Time: 11/15/24  6:57 AM   Result Value Ref Range    Sodium 136 136 - 145 mmol/L    Potassium 4.1 3.5 - 5.1 mmol/L    Chloride 104 98 - 107 mmol/L    CO2 26 23 - 31 mmol/L    Glucose 88 82 - 115 mg/dL    Blood Urea Nitrogen 15.0 8.4 - 25.7 mg/dL    Creatinine 1.09 0.72 - 1.25 mg/dL    BUN/Creatinine Ratio 14     Calcium 8.3 (L) 8.8 - 10.0 mg/dL    Anion Gap 6.0 mEq/L    eGFR >60 mL/min/1.73/m2   CBC with Differential    Collection Time: 11/15/24  6:57 AM   Result Value Ref Range    WBC 7.65 4.50 - 11.50 x10(3)/mcL    RBC 3.05 (L) 4.70 - 6.10 x10(6)/mcL    Hgb 9.1 (L) 14.0 - 18.0 g/dL    Hct 26.7 (L) 42.0 - 52.0 %    MCV 87.5 80.0 - 94.0 fL    MCH 29.8 27.0 - 31.0 pg    MCHC 34.1 33.0 - 36.0 g/dL    RDW 15.9 11.5 - 17.0 %    Platelet 383 130 - 400 x10(3)/mcL    MPV 8.8 7.4 - 10.4 fL    Neut % 46.7 %    Lymph % 38.0 %    Mono % 14.1 %    Eos % 0.4 %    Basophil % 0.3 %    Lymph # 2.91 0.6 - 4.6 x10(3)/mcL    Neut # 3.57 2.1 - 9.2 x10(3)/mcL    Mono # 1.08 0.1 - 1.3 x10(3)/mcL    Eos # 0.03 0 - 0.9 x10(3)/mcL    Baso # 0.02 <=0.2 " x10(3)/mcL    IG# 0.04 0 - 0.04 x10(3)/mcL    IG% 0.5 %    NRBC% 0.0 %   POCT glucose    Collection Time: 11/15/24 11:41 AM   Result Value Ref Range    POCT Glucose 85 70 - 110 mg/dL   POCT glucose    Collection Time: 11/16/24  6:12 AM   Result Value Ref Range    POCT Glucose 89 70 - 110 mg/dL   Basic Metabolic Panel    Collection Time: 11/16/24  6:14 AM   Result Value Ref Range    Sodium 133 (L) 136 - 145 mmol/L    Potassium 4.3 3.5 - 5.1 mmol/L    Chloride 102 98 - 107 mmol/L    CO2 25 23 - 31 mmol/L    Glucose 88 82 - 115 mg/dL    Blood Urea Nitrogen 16.0 8.4 - 25.7 mg/dL    Creatinine 1.15 0.72 - 1.25 mg/dL    BUN/Creatinine Ratio 14     Calcium 8.3 (L) 8.8 - 10.0 mg/dL    Anion Gap 6.0 mEq/L    eGFR >60 mL/min/1.73/m2   CBC with Differential    Collection Time: 11/16/24  6:14 AM   Result Value Ref Range    WBC 8.87 4.50 - 11.50 x10(3)/mcL    RBC 3.41 (L) 4.70 - 6.10 x10(6)/mcL    Hgb 10.0 (L) 14.0 - 18.0 g/dL    Hct 29.8 (L) 42.0 - 52.0 %    MCV 87.4 80.0 - 94.0 fL    MCH 29.3 27.0 - 31.0 pg    MCHC 33.6 33.0 - 36.0 g/dL    RDW 16.0 11.5 - 17.0 %    Platelet 453 (H) 130 - 400 x10(3)/mcL    MPV 9.0 7.4 - 10.4 fL    Neut % 54.1 %    Lymph % 34.4 %    Mono % 10.1 %    Eos % 0.7 %    Basophil % 0.1 %    Lymph # 3.05 0.6 - 4.6 x10(3)/mcL    Neut # 4.80 2.1 - 9.2 x10(3)/mcL    Mono # 0.90 0.1 - 1.3 x10(3)/mcL    Eos # 0.06 0 - 0.9 x10(3)/mcL    Baso # 0.01 <=0.2 x10(3)/mcL    IG# 0.05 (H) 0 - 0.04 x10(3)/mcL    IG% 0.6 %    NRBC% 0.0 %       Imaging:  X-Ray Abdomen AP 1 View    Result Date: 11/11/2024  EXAMINATION: XR ABDOMEN AP 1 VIEW CLINICAL HISTORY: abdominal distension; TECHNIQUE: AP X-RAY OF THE ABDOMEN: CPT 46191 FINDINGS: Examination reveals some residual feces throughout the colon the gas pattern is nonspecific with no clear evidence of ileus or obstruction no abnormal masses or calcifications identified. Coils are identified in the mid abdomen IVC filter is also identified     Nonspecific gas pattern  Electronically signed by: Rob Arauz Date:    11/11/2024 Time:    08:03    X-Ray Abdomen AP 1 View    Result Date: 11/4/2024  EXAMINATION: XR ABDOMEN AP 1 VIEW CLINICAL HISTORY: Constipation; TECHNIQUE: Single-view of the abdomen COMPARISON: 07/21/2023 FINDINGS: Scoliotic curvature of the spine again noted.  Stool scattered throughout the colon as would be seen with constipation.  Degenerative changes of the bilateral hips.     Findings as would be seen with constipation. Electronically signed by: Konrad Robertson Date:    11/04/2024 Time:    12:44    X-Ray Chest 1 View    Result Date: 10/31/2024  EXAMINATION: XR CHEST 1 VIEW CLINICAL HISTORY: hypoxia; TECHNIQUE: Single frontal view of the chest was performed. COMPARISON: 10/29/2024 FINDINGS: LINES AND TUBES: Dual lead cardiac pacer device is in place via left subclavian approach with leads overlying the right atrium and right ventricle.  EKG/telemetry leads overlie the chest.  Tip of right internal jugular catheter is obscured by overlying hardware. MEDIASTINUM AND LIANET: Cardiac silhouette is at the upper limits of normal. LUNGS: Persistent retrocardiac opacity. PLEURA:Blunting of the costophrenic sulci suggesting small pleural effusions.No pneumothorax. OTHER: Postop thoracic spinal fusion.  Old right rib and shoulder deformities.  Vascular coils seen in the upper abdomen.     Persistent retrocardiac opacity which may be related to atelectasis, pneumonia or pleural fluid. Electronically signed by: Tracy Zamudio Date:    10/31/2024 Time:    12:39    X-Ray Chest 1 View    Result Date: 10/29/2024  EXAMINATION: XR CHEST 1 VIEW CLINICAL HISTORY: Central line placement; TECHNIQUE: AP chest COMPARISON: Chest x-ray dated 10/26/2024 FINDINGS: Left chest wall pacemaker is in place.  The heart is stable in size.  There is improving aeration in the lungs with small residual pleural effusions.  There is no definite visible pneumothorax.     Improved aeration of the  lungs with small residual pleural effusions. Electronically signed by: Lanette Waller Date:    10/29/2024 Time:    16:25    CV Ultrasound doppler venous arm left    Result Date: 10/29/2024  Positive for deep  vein thrombosis in the left  upper extremity YOLIE Rodriguez notified of results at time of exam 0030 hours      CV Ultrasound doppler arterial arm left    Result Date: 10/29/2024  Patent right upper extremity.     CV Ultrasound doppler venous arm right    Result Date: 10/27/2024  Positive for deep  vein thrombosis in brachial and radial veins of the right upper extremity. Negative superficial vein thrombosis in  the right upper extremity. Notification: Critical results given verbally to assigned nurse at bedside on 10/24/2024.     X-Ray Chest 1 View    Result Date: 10/26/2024  EXAMINATION: Single view chest radiograph. CLINICAL HISTORY: pleural effusion; TECHNIQUE: Single view of the chest. COMPARISON: Chest radiograph 10/21/2024. FINDINGS: The interstitial pulmonary edema, bilateral effusions, lower lobe atelectasis are unchanged.  There is no pneumothorax.  The cardiac silhouette is enlarged.  The pacing device and thoracic fixation hardware are unchanged.     No significant change from prior exam. Electronically signed by: Wyatt Quintero MD Date:    10/26/2024 Time:    08:53    CV Ultrasound doppler venous legs bilat    Result Date: 10/21/2024  Negative for deep and superficial vein thrombosis in bilateral lower extremities.  Limited visibility of right common femoral vein secondary to line placement.     X-Ray Chest 1 View    Result Date: 10/21/2024  EXAMINATION: XR CHEST 1 VIEW CLINICAL HISTORY: SOB and hypoxia; TECHNIQUE: Single frontal view of the chest was performed. COMPARISON: 10/17/2024 FINDINGS: There is increased density in both lung bases most consistent with pleural effusions with associated atelectasis and/or infiltrate.  Heart is enlarged.  Pacing device is in place.  There is hardware in the spine.      Bilateral pleural effusions left greater than right with associated atelectasis and/or infiltrate. Electronically signed by: Ankit Davila MD Date:    10/21/2024 Time:    10:13         Assessment/Plan:  Status post diverting colostomy secondary to advanced sacral wounds  Ischemic colitis    No further overt GI bleeding  Some concern for delayed passage of stool    Agree to continue MiraLax  Repeat abdominal x-ray two view gas pattern in a.m.       Red Garza MD
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
## 2024-11-16 NOTE — PROGRESS NOTES
11/16/24 0800   Gastrointestinal   GI WDL ex;appearance/characteristics   Abdominal Appearance distended   Abdominal Palpation All Quadrants   All Quadrants Abdominal Palpation soft/nontender   Bowel Sounds All Quadrants   All Quadrants Bowel Sounds audible and normoactive   Stool Amount other (see comments)  (no colostomy output)   GI Signs/Symptoms abdominal discomfort   Last Bowel Movement 11/13/24     Notified on call GI MD- Dr. Garza at 0851. No new orders at this time. Plan of care ongoing.

## 2024-11-17 PROCEDURE — 25000003 PHARM REV CODE 250: Performed by: NURSE PRACTITIONER

## 2024-11-17 PROCEDURE — 25000003 PHARM REV CODE 250: Performed by: STUDENT IN AN ORGANIZED HEALTH CARE EDUCATION/TRAINING PROGRAM

## 2024-11-17 PROCEDURE — 25000003 PHARM REV CODE 250: Performed by: HOSPITALIST

## 2024-11-17 PROCEDURE — 25000003 PHARM REV CODE 250: Performed by: INTERNAL MEDICINE

## 2024-11-17 PROCEDURE — 11000001 HC ACUTE MED/SURG PRIVATE ROOM

## 2024-11-17 PROCEDURE — 25000003 PHARM REV CODE 250

## 2024-11-17 PROCEDURE — 27000207 HC ISOLATION

## 2024-11-17 PROCEDURE — 94799 UNLISTED PULMONARY SVC/PX: CPT

## 2024-11-17 PROCEDURE — 99900031 HC PATIENT EDUCATION (STAT)

## 2024-11-17 PROCEDURE — 99900035 HC TECH TIME PER 15 MIN (STAT)

## 2024-11-17 PROCEDURE — 63600175 PHARM REV CODE 636 W HCPCS: Performed by: STUDENT IN AN ORGANIZED HEALTH CARE EDUCATION/TRAINING PROGRAM

## 2024-11-17 PROCEDURE — A4216 STERILE WATER/SALINE, 10 ML: HCPCS | Performed by: STUDENT IN AN ORGANIZED HEALTH CARE EDUCATION/TRAINING PROGRAM

## 2024-11-17 PROCEDURE — 21400001 HC TELEMETRY ROOM

## 2024-11-17 RX ADMIN — GABAPENTIN 400 MG: 400 CAPSULE ORAL at 05:11

## 2024-11-17 RX ADMIN — MORPHINE SULFATE 2 MG: 4 INJECTION, SOLUTION INTRAMUSCULAR; INTRAVENOUS at 04:11

## 2024-11-17 RX ADMIN — PANTOPRAZOLE SODIUM 40 MG: 40 TABLET, DELAYED RELEASE ORAL at 04:11

## 2024-11-17 RX ADMIN — HYDROCHLOROTHIAZIDE 25 MG: 25 TABLET ORAL at 08:11

## 2024-11-17 RX ADMIN — OXYCODONE AND ACETAMINOPHEN 1 TABLET: 10; 325 TABLET ORAL at 08:11

## 2024-11-17 RX ADMIN — FLUOXETINE 20 MG: 20 CAPSULE ORAL at 08:11

## 2024-11-17 RX ADMIN — FENOFIBRATE 48 MG: 48 TABLET, FILM COATED ORAL at 08:11

## 2024-11-17 RX ADMIN — Medication 400 ML: at 04:11

## 2024-11-17 RX ADMIN — CARVEDILOL 50 MG: 12.5 TABLET, FILM COATED ORAL at 08:11

## 2024-11-17 RX ADMIN — GABAPENTIN 400 MG: 400 CAPSULE ORAL at 09:11

## 2024-11-17 RX ADMIN — OXYCODONE AND ACETAMINOPHEN 1 TABLET: 10; 325 TABLET ORAL at 01:11

## 2024-11-17 RX ADMIN — DOXYCYCLINE HYCLATE 100 MG: 100 TABLET, COATED ORAL at 09:11

## 2024-11-17 RX ADMIN — SENNOSIDES AND DOCUSATE SODIUM 2 TABLET: 50; 8.6 TABLET ORAL at 09:11

## 2024-11-17 RX ADMIN — SODIUM CHLORIDE, PRESERVATIVE FREE 10 ML: 5 INJECTION INTRAVENOUS at 06:11

## 2024-11-17 RX ADMIN — HYOSCYAMINE SULFATE 0.12 MG: 0.12 TABLET SUBLINGUAL at 08:11

## 2024-11-17 RX ADMIN — TRAZODONE HYDROCHLORIDE 200 MG: 100 TABLET ORAL at 09:11

## 2024-11-17 RX ADMIN — HYOSCYAMINE SULFATE 0.12 MG: 0.12 TABLET SUBLINGUAL at 01:11

## 2024-11-17 RX ADMIN — ATORVASTATIN CALCIUM 20 MG: 10 TABLET, FILM COATED ORAL at 09:11

## 2024-11-17 RX ADMIN — OXYCODONE AND ACETAMINOPHEN 1 TABLET: 10; 325 TABLET ORAL at 05:11

## 2024-11-17 RX ADMIN — OXYCODONE AND ACETAMINOPHEN 1 TABLET: 10; 325 TABLET ORAL at 09:11

## 2024-11-17 RX ADMIN — OXYBUTYNIN CHLORIDE 5 MG: 5 TABLET ORAL at 04:11

## 2024-11-17 RX ADMIN — DOXYCYCLINE HYCLATE 100 MG: 100 TABLET, COATED ORAL at 08:11

## 2024-11-17 RX ADMIN — GABAPENTIN 400 MG: 400 CAPSULE ORAL at 01:11

## 2024-11-17 RX ADMIN — HYOSCYAMINE SULFATE 0.12 MG: 0.12 TABLET SUBLINGUAL at 02:11

## 2024-11-17 RX ADMIN — POLYETHYLENE GLYCOL 3350 17 G: 17 POWDER, FOR SOLUTION ORAL at 08:11

## 2024-11-17 RX ADMIN — HYOSCYAMINE SULFATE 0.12 MG: 0.12 TABLET SUBLINGUAL at 09:11

## 2024-11-17 RX ADMIN — OXYBUTYNIN CHLORIDE 5 MG: 5 TABLET ORAL at 09:11

## 2024-11-17 RX ADMIN — SODIUM CHLORIDE, PRESERVATIVE FREE 10 ML: 5 INJECTION INTRAVENOUS at 12:11

## 2024-11-17 RX ADMIN — Medication 400 ML: at 05:11

## 2024-11-17 RX ADMIN — SENNOSIDES AND DOCUSATE SODIUM 2 TABLET: 50; 8.6 TABLET ORAL at 08:11

## 2024-11-17 RX ADMIN — PANTOPRAZOLE SODIUM 40 MG: 40 TABLET, DELAYED RELEASE ORAL at 05:11

## 2024-11-17 RX ADMIN — POLYETHYLENE GLYCOL 3350 17 G: 17 POWDER, FOR SOLUTION ORAL at 09:11

## 2024-11-17 RX ADMIN — CARVEDILOL 50 MG: 12.5 TABLET, FILM COATED ORAL at 09:11

## 2024-11-17 RX ADMIN — HYOSCYAMINE SULFATE 0.12 MG: 0.12 TABLET SUBLINGUAL at 05:11

## 2024-11-17 RX ADMIN — NIFEDIPINE 90 MG: 90 TABLET, FILM COATED, EXTENDED RELEASE ORAL at 08:11

## 2024-11-17 RX ADMIN — OXYBUTYNIN CHLORIDE 5 MG: 5 TABLET ORAL at 08:11

## 2024-11-17 RX ADMIN — SODIUM CHLORIDE, PRESERVATIVE FREE 10 ML: 5 INJECTION INTRAVENOUS at 05:11

## 2024-11-17 RX ADMIN — OXYCODONE AND ACETAMINOPHEN 1 TABLET: 10; 325 TABLET ORAL at 03:11
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
## 2024-11-17 NOTE — PROGRESS NOTES
Infectious Diseases Progress Note  58 y/o male with past medical history of T-spine cord injury with paraplegia, diabetes, HTN, hepatitis-C, chronic MRSA L ankle wound/osteomyelitis, was on suppressive doxycycline and R knee infection/septic arthritis and osteomyelitis with GBS/Enterococcus and Ecoli isolates who presented to ER on 10/8 with increased generalized pain, increased foul smelling drainage from sacral wound with fever and chills. He was noted to be hypotensive per EMS. On admit he was afebrile without leukocytosis. ESR 98 and .80. MRSA PCR (-). U/A with 21-50 WBC, 0-5 RBC, 75 LE, no bacteria, negative nitrites. Urine culture negative. Blood cultures negative. Sacral wound culture with many Acinetobacter, many ESBL Ecoli, moderate Enterococcus, Bacteroides and Peptoniphilus. CT pelvis with contrast showed worsening inflammatory change of the sacral decubitus ulcers with gas now identified inferior to the right pubic ramus, stool noted throughout the colon as may be seen with constipation, pyelonephritis is not excluded. Seen by Surgery and underwent debridement of sacral wound. During his stay he was noted to have large amounts of bleeding from sacral wound. CT abdomen/pelvis on 10/17 showed interval development of L renal rupture with large subcapsular hematoma, L retroperitoneal hemorrhage. He received multiple blood/blood products over the past couple days. He was noted to be hypotensive and was transferred to ICU on 10/17. He was seen by Vascular and underwent Aortogram,  left renal angiography with coil embolization of left renal artery and right groin central venous line insertion on 10/17. Sputum culture on 108/23 revealing Pseudomonas. RUE venous ultrasound positive for deep  vein thrombosis in brachial and radial veins of the right upper extremity. Sputum culture with MDR Pseudomonas. S/P colostomy placement on 11/6.  He is currently on chronic oral suppression with  Doxycycline    Subjective:  Lying in bed, no acute distress. Currently on 1L NC. Afebrile. Wife present    Review of Systems   Constitutional:  Positive for malaise/fatigue.   HENT: Negative.     Eyes: Negative.    Respiratory:  Positive for shortness of breath.    Cardiovascular: Negative.    Gastrointestinal: Negative.    Musculoskeletal: Negative.    Skin: Negative.    Neurological:  Positive for focal weakness and weakness.   All other systems reviewed and are negative.      Review of patient's allergies indicates:   Allergen Reactions    Baclofen Itching and Anxiety       Past Medical History:   Diagnosis Date    Arthritis     Chronic ulcer of ankle 05/26/2022    ESBL (extended spectrum beta-lactamase) producing bacteria infection 08/30/2024    Frequent UTI 07/02/2019    Generalized anxiety disorder 05/26/2022    Neurogenic bladder 05/26/2022    Osteomyelitis 05/26/2022    Paraplegia     Presence of suprapubic catheter 05/26/2022    Pure hypercholesterolemia 05/26/2022    Retention of urine, unspecified 08/09/2019    Spinal cord injury at T1-T6 level 04/20/2018       Past Surgical History:   Procedure Laterality Date    ANGIOGRAM, RENAL ARTERIES, BILATERAL N/A 10/17/2024    Procedure: Angiogram, Renal Arteries, Bilateral;  Surgeon: Pati King MD;  Location: [facility] CATH LAB;  Service: Peripheral Vascular;  Laterality: N/A;  left renal artery coiling    APPLICATION OF WOUND VACUUM-ASSISTED CLOSURE DEVICE N/A 10/10/2024    Procedure: APPLICATION, WOUND VAC;  Surgeon: Rob Sinclair MD;  Location: [facility];  Service: General;  Laterality: N/A;    COLONOSCOPY N/A 11/13/2024    Procedure: COLON;  Surgeon: Thanh Olson MD;  Location: [facility] ENDOSCOPY;  Service: Gastroenterology;  Laterality: N/A;  though ostomy    CREATION, COLOSTOMY, LAPAROSCOPIC N/A 11/6/2024    Procedure: CREATION, COLOSTOMY, LAPAROSCOPIC CONVERTED  TO OPEN;  Surgeon: Rob Sinclair MD;  Location: [facility];  Service:  General;  Laterality: N/A;    EGD, WITH CLOSED BIOPSY N/A 8/29/2024    Procedure: EGD, WITH CLOSED BIOPSY;  Surgeon: Mikal Segovia MD;  Location: [facility] ENDOSCOPY;  Service: Gastroenterology;  Laterality: N/A;    ESOPHAGOGASTRODUODENOSCOPY N/A 8/29/2024    Procedure: EGD;  Surgeon: Mikal Segovia MD;  Location: [facility] ENDOSCOPY;  Service: Gastroenterology;  Laterality: N/A;    FRACTURE SURGERY  2021    INCISION AND DRAINAGE, LOWER EXTREMITY Right 06/29/2023    Procedure: INCISION AND DRAINAGE, LOWER EXTREMITY;  Surgeon: Prabhu Shen DO;  Location: [facility];  Service: Orthopedics;  Laterality: Right;  supine bone foam wash stuff cultures    INCISION AND DRAINAGE, LOWER EXTREMITY Right 11/30/2023    Procedure: INCISION AND DRAINAGE, LOWER EXTREMITY;  Surgeon: Prabhu Shen DO;  Location: [facility];  Service: Orthopedics;  Laterality: Right;  supine any table bone foam wash stuff possible wound vac    INCISION AND DRAINAGE, LOWER EXTREMITY Right 12/1/2023    Procedure: INCISION AND DRAINAGE, LOWER EXTREMITY;  Surgeon: Prabhu Shen DO;  Location: [facility];  Service: Orthopedics;  Laterality: Right;  supine bone foam vascular bed removal of distal femoral plate, wash stuff, possible AKA    INSERTION OF INTRAMEDULLARY MARISA Right 08/10/2022    Procedure: INSERTION, INTRAMEDULLARY MARISA RIGHT TIBIA;  Surgeon: Jorge Orellana MD;  Location: [facility];  Service: Orthopedics;  Laterality: Right;    JOINT REPLACEMENT  2021    Ankel    PRESSURE ULCER DEBRIDEMENT N/A 10/10/2024    Procedure: DEBRIDEMENT, PRESSURE ULCER;  Surgeon: Rob Sinclair MD;  Location: [facility];  Service: General;  Laterality: N/A;  sacral wound, R buttock wound    REMOVAL OF HARDWARE FROM LOWER EXTREMITY Right 12/1/2023    Procedure: REMOVAL, HARDWARE, LOWER EXTREMITY;  Surgeon: Prabhu Shen DO;  Location: [facility];  Service: Orthopedics;  Laterality: Right;    SPINE SURGERY  March 1st 2018       Social History     Socioeconomic History     Marital status:    Tobacco Use    Smoking status: Some Days     Current packs/day: 0.00     Average packs/day: 0.2 packs/day for 41.5 years (10.4 ttl pk-yrs)     Types: Cigars, Cigarettes     Start date: 1982     Last attempt to quit: 2023     Years since quittin.3    Smokeless tobacco: Never   Substance and Sexual Activity    Alcohol use: Not Currently     Alcohol/week: 2.0 standard drinks of alcohol     Types: 2 Cans of beer per week    Drug use: Not Currently     Types: Oxycodone    Sexual activity: Not Currently     Partners: Female     Birth control/protection: None     Social Drivers of Health     Financial Resource Strain: Low Risk  (10/10/2024)    Overall Financial Resource Strain (CARDIA)     Difficulty of Paying Living Expenses: Not hard at all   Food Insecurity: No Food Insecurity (10/10/2024)    Hunger Vital Sign     Worried About Running Out of Food in the Last Year: Never true     Ran Out of Food in the Last Year: Never true   Transportation Needs: No Transportation Needs (10/10/2024)    TRANSPORTATION NEEDS     Transportation : No   Physical Activity: Inactive (2023)    Exercise Vital Sign     Days of Exercise per Week: 0 days     Minutes of Exercise per Session: 0 min   Stress: No Stress Concern Present (10/10/2024)    [employer] - Occupational Stress Questionnaire     Feeling of Stress : Only a little   Housing Stability: Low Risk  (10/10/2024)    Housing Stability Vital Sign     Unable to Pay for Housing in the Last Year: No     Homeless in the Last Year: No       Scheduled Meds:   atorvastatin  20 mg Oral Nightly    carvediloL  50 mg Oral BID    doxycycline  100 mg Oral Q12H    electrolytes-dextrose  400 mL Oral BID AC    fenofibrate  48 mg Oral Daily    FLUoxetine  20 mg Oral Daily    gabapentin  400 mg Oral Q8H    hydroCHLOROthiazide  25 mg Oral Daily    hyoscyamine  0.125 mg Sublingual Q4H    NIFEdipine  90 mg  "Oral Daily    oxybutynin  5 mg Per NG tube TID    pantoprazole  40 mg Oral BID AC    polyethylene glycol  17 g Oral BID    senna-docusate 8.6-50 mg  2 tablet Oral BID    sodium chloride 0.9%  10 mL Intravenous Q6H     Continuous Infusions:        PRN Meds:  Current Facility-Administered Medications:     0.9%  NaCl infusion (for blood administration), , Intravenous, Q24H PRN    0.9%  NaCl infusion (for blood administration), , Intravenous, Q24H PRN    0.9%  NaCl infusion (for blood administration), , Intravenous, Q24H PRN    0.9%  NaCl infusion (for blood administration), , Intravenous, Q24H PRN    acetaminophen, 650 mg, Oral, Q4H PRN    albuterol-ipratropium, 3 mL, Nebulization, Q4H PRN    aluminum-magnesium hydroxide-simethicone, 30 mL, Oral, QID PRN    dextrose 10%, 12.5 g, Intravenous, PRN    dextrose 10%, 25 g, Intravenous, PRN    diphenhydrAMINE, 50 mg, Intravenous, Q6H PRN    etomidate, , Intravenous, Code/trauma/sedation Med    glucagon (human recombinant), 1 mg, Intramuscular, PRN    guaiFENesin 100 mg/5 ml, 200 mg, Oral, Q4H PRN    hydrALAZINE, 20 mg, Intravenous, Q4H PRN    hydrOXYzine HCL, 25 mg, Oral, BID PRN    methocarbamoL, 750 mg, Oral, TID PRN    morphine, 2 mg, Intravenous, BID PRN    ondansetron, 4 mg, Intravenous, Q4H PRN    oxyCODONE-acetaminophen, 1 tablet, Oral, Q4H PRN    prochlorperazine, 5 mg, Intravenous, Q6H PRN    rocuronium, , Intravenous, Code/trauma/sedation Med    sodium chloride 0.9%, 10 mL, Intravenous, PRN    Flushing PICC/Midline Protocol, , , Until Discontinued **AND** sodium chloride 0.9%, 10 mL, Intravenous, Q6H **AND** sodium chloride 0.9%, 10 mL, Intravenous, PRN    traZODone, 200 mg, Oral, Nightly PRN    Objective:  /86   Pulse 83   Temp 98 °F (36.7 °C) (Oral)   Resp 16   Ht 5' 10" (1.778 m)   Wt 79.3 kg (174 lb 13.2 oz)   SpO2 100%   BMI 25.08 kg/m²     Physical Exam:   Physical Exam  Vitals reviewed.   HENT:      Head: " Normocephalic.   Cardiovascular:      Rate and Rhythm: Normal rate and regular rhythm.   Pulmonary:      Effort: Pulmonary effort is normal. No respiratory distress.      Breath sounds: Normal breath sounds. No wheezing.      Comments: Currently on 1L NC  Abdominal:      General: Bowel sounds are normal. There is no distension.      Palpations: Abdomen is soft.      Tenderness: There is no abdominal tenderness.   Genitourinary:     Comments: Suprapubic catheter  Musculoskeletal:      Cervical back: Normal range of motion.      Comments: Paraplegia   Skin:     Findings: No rash.      Comments: Wounds dressed. R chest tunneled central line   Neurological:      Mental Status: He is alert and oriented to person, place, and time.   Psychiatric:         Behavior: Behavior normal.   Imaging    Lab Review   Recent Results (from the past 24 hours)   POCT glucose    Collection Time: 11/16/24  6:12 AM   Result Value Ref Range    POCT Glucose 89 70 - 110 mg/dL   Basic Metabolic Panel    Collection Time: 11/16/24  6:14 AM   Result Value Ref Range    Sodium 133 (L) 136 - 145 mmol/L    Potassium 4.3 3.5 - 5.1 mmol/L    Chloride 102 98 - 107 mmol/L    CO2 25 23 - 31 mmol/L    Glucose 88 82 - 115 mg/dL    Blood Urea Nitrogen 16.0 8.4 - 25.7 mg/dL    Creatinine 1.15 0.72 - 1.25 mg/dL    BUN/Creatinine Ratio 14     Calcium 8.3 (L) 8.8 - 10.0 mg/dL    Anion Gap 6.0 mEq/L    eGFR >60 mL/min/1.73/m2   CBC with Differential    Collection Time: 11/16/24  6:14 AM   Result Value Ref Range    WBC 8.87 4.50 - 11.50 x10(3)/mcL    RBC 3.41 (L) 4.70 - 6.10 x10(6)/mcL    Hgb 10.0 (L) 14.0 - 18.0 g/dL    Hct 29.8 (L) 42.0 - 52.0 %    MCV 87.4 80.0 - 94.0 fL    MCH 29.3 27.0 - 31.0 pg    MCHC 33.6 33.0 - 36.0 g/dL    RDW 16.0 11.5 - 17.0 %    Platelet 453 (H) 130 - 400 x10(3)/mcL    MPV 9.0 7.4 - 10.4 fL    Neut % 54.1 %    Lymph % 34.4 %    Mono % 10.1 %    Eos % 0.7 %    Basophil % 0.1 %    Lymph # 3.05 0.6 - 4.6 x10(3)/mcL    Neut # 4.80 2.1 -  9.2 x10(3)/mcL    Mono # 0.90 0.1 - 1.3 x10(3)/mcL    Eos # 0.06 0 - 0.9 x10(3)/mcL    Baso # 0.01 <=0.2 x10(3)/mcL    IG# 0.05 (H) 0 - 0.04 x10(3)/mcL    IG% 0.6 %    NRBC% 0.0 %       Assessment/Plan:  Sepsis - resolved  Sacral wound infection - CR Acinetobacter / ESBL Ecoli / Enterococcus / Bacteroides / Peptoniphilus isolates  Pneumonitis / Pleural effusions  MDR Pseudomonas (+) sputum  ANTON  Pyelonephritis per CT   Constipation  Paraplegia  Neurogenic bladder with suprapubic catheter  DM Type II  MRSA L ankle osteomyelitis with retained hardware  Anemia    -On chronic suppressive antibiotic therapy with oral Doxycycline  -Remains afebrile without leukocytosis, follow  -Received multiple units of blood and blood products during his stay  -Urology consult for leakage around the suprapubic catheter, inputs noted  -Currently on 1L NC, wean as tolerated  -Repeat CXR on 10/26 with pulmonary edema, bilateral effusions, lower lobe atelectasis unchanged.   -CXR on 10/21 with B/L pleural effusions with associated atelectasis and/or infiltrate  -Sputum culture on 10/23 with moderate Pseudomonas species, follow  -Pt with significant anemia, CT abdomen/pelvis showed L renal rupture with large subcapsular hematoma  -Seen by Vascular, inputs noted. S/P aortogram, left renal angiography with coil embolization of left renal artery on 10/17  -CT pelvis with contrast showed worsening inflammatory change of sacral ulcer with gas inferior to the R pubic ramus  -S/P debridement of sacral wound on 10/10  -Sacral wound cultures revealing CR Acinetobacter, ESBL Ecoli, Enterococcus, Bacteroides, Peptoniphilus isolates  -Continue wound care  -Surgery following. S/P colostomy placement on 11/6  -Blood cultures negative   -Discussed with patient, wife and nursing staff. Pt is now a DNR. CM working on placement

## 2024-11-17 NOTE — NURSING
Notified Dr. Morgan patient's suprapubic cath continues to leak intermittently. Per MD brown to inflate lentz balloon more and reassess. 8 cc of saline instilled into lentz balloon, will monitor. Plan of care ongoing.

## 2024-11-18 LAB
ALBUMIN SERPL-MCNC: 1.7 G/DL (ref 3.4–4.8)
ALBUMIN/GLOB SERPL: 0.4 RATIO (ref 1.1–2)
ALP SERPL-CCNC: 81 UNIT/L (ref 40–150)
ALT SERPL-CCNC: 5 UNIT/L (ref 0–55)
ANION GAP SERPL CALC-SCNC: 6 MEQ/L
AST SERPL-CCNC: 23 UNIT/L (ref 5–34)
BASOPHILS # BLD AUTO: 0.02 X10(3)/MCL
BASOPHILS NFR BLD AUTO: 0.2 %
BILIRUB SERPL-MCNC: 0.6 MG/DL
BUN SERPL-MCNC: 18.1 MG/DL (ref 8.4–25.7)
CALCIUM SERPL-MCNC: 8.5 MG/DL (ref 8.8–10)
CHLORIDE SERPL-SCNC: 103 MMOL/L (ref 98–107)
CO2 SERPL-SCNC: 27 MMOL/L (ref 23–31)
CREAT SERPL-MCNC: 1.23 MG/DL (ref 0.72–1.25)
CREAT/UREA NIT SERPL: 15
EOSINOPHIL # BLD AUTO: 0.05 X10(3)/MCL (ref 0–0.9)
EOSINOPHIL NFR BLD AUTO: 0.5 %
ERYTHROCYTE [DISTWIDTH] IN BLOOD BY AUTOMATED COUNT: 16.3 % (ref 11.5–17)
GFR SERPLBLD CREATININE-BSD FMLA CKD-EPI: >60 ML/MIN/1.73/M2
GLOBULIN SER-MCNC: 4.5 GM/DL (ref 2.4–3.5)
GLUCOSE SERPL-MCNC: 95 MG/DL (ref 82–115)
HCT VFR BLD AUTO: 29.9 % (ref 42–52)
HGB BLD-MCNC: 9.7 G/DL (ref 14–18)
IMM GRANULOCYTES # BLD AUTO: 0.05 X10(3)/MCL (ref 0–0.04)
IMM GRANULOCYTES NFR BLD AUTO: 0.5 %
LYMPHOCYTES # BLD AUTO: 3.26 X10(3)/MCL (ref 0.6–4.6)
LYMPHOCYTES NFR BLD AUTO: 32.4 %
MCH RBC QN AUTO: 29.8 PG (ref 27–31)
MCHC RBC AUTO-ENTMCNC: 32.4 G/DL (ref 33–36)
MCV RBC AUTO: 92 FL (ref 80–94)
MONOCYTES # BLD AUTO: 0.99 X10(3)/MCL (ref 0.1–1.3)
MONOCYTES NFR BLD AUTO: 9.8 %
NEUTROPHILS # BLD AUTO: 5.7 X10(3)/MCL (ref 2.1–9.2)
NEUTROPHILS NFR BLD AUTO: 56.6 %
NRBC BLD AUTO-RTO: 0 %
PLATELET # BLD AUTO: 448 X10(3)/MCL (ref 130–400)
PMV BLD AUTO: 8.8 FL (ref 7.4–10.4)
POCT GLUCOSE: 101 MG/DL (ref 70–110)
POCT GLUCOSE: 96 MG/DL (ref 70–110)
POTASSIUM SERPL-SCNC: 4.7 MMOL/L (ref 3.5–5.1)
PROT SERPL-MCNC: 6.2 GM/DL (ref 5.8–7.6)
RBC # BLD AUTO: 3.25 X10(6)/MCL (ref 4.7–6.1)
SODIUM SERPL-SCNC: 136 MMOL/L (ref 136–145)
WBC # BLD AUTO: 10.07 X10(3)/MCL (ref 4.5–11.5)

## 2024-11-18 PROCEDURE — 25000003 PHARM REV CODE 250: Performed by: INTERNAL MEDICINE

## 2024-11-18 PROCEDURE — 25000003 PHARM REV CODE 250: Performed by: STUDENT IN AN ORGANIZED HEALTH CARE EDUCATION/TRAINING PROGRAM

## 2024-11-18 PROCEDURE — 25000003 PHARM REV CODE 250

## 2024-11-18 PROCEDURE — 25000003 PHARM REV CODE 250: Performed by: HOSPITALIST

## 2024-11-18 PROCEDURE — 99900035 HC TECH TIME PER 15 MIN (STAT)

## 2024-11-18 PROCEDURE — 80053 COMPREHEN METABOLIC PANEL: CPT | Performed by: HOSPITALIST

## 2024-11-18 PROCEDURE — 11000001 HC ACUTE MED/SURG PRIVATE ROOM

## 2024-11-18 PROCEDURE — 27000207 HC ISOLATION

## 2024-11-18 PROCEDURE — A4216 STERILE WATER/SALINE, 10 ML: HCPCS | Performed by: STUDENT IN AN ORGANIZED HEALTH CARE EDUCATION/TRAINING PROGRAM

## 2024-11-18 PROCEDURE — 85025 COMPLETE CBC W/AUTO DIFF WBC: CPT | Performed by: HOSPITALIST

## 2024-11-18 PROCEDURE — 21400001 HC TELEMETRY ROOM

## 2024-11-18 PROCEDURE — 25000003 PHARM REV CODE 250: Performed by: NURSE PRACTITIONER

## 2024-11-18 PROCEDURE — 27000221 HC OXYGEN, UP TO 24 HOURS

## 2024-11-18 PROCEDURE — 36415 COLL VENOUS BLD VENIPUNCTURE: CPT | Performed by: HOSPITALIST

## 2024-11-18 RX ADMIN — DOXYCYCLINE HYCLATE 100 MG: 100 TABLET, COATED ORAL at 09:11

## 2024-11-18 RX ADMIN — HYOSCYAMINE SULFATE 0.12 MG: 0.12 TABLET SUBLINGUAL at 06:11

## 2024-11-18 RX ADMIN — OXYBUTYNIN CHLORIDE 5 MG: 5 TABLET ORAL at 09:11

## 2024-11-18 RX ADMIN — HYOSCYAMINE SULFATE 0.12 MG: 0.12 TABLET SUBLINGUAL at 07:11

## 2024-11-18 RX ADMIN — NIFEDIPINE 90 MG: 90 TABLET, FILM COATED, EXTENDED RELEASE ORAL at 09:11

## 2024-11-18 RX ADMIN — ATORVASTATIN CALCIUM 20 MG: 10 TABLET, FILM COATED ORAL at 09:11

## 2024-11-18 RX ADMIN — FENOFIBRATE 48 MG: 48 TABLET, FILM COATED ORAL at 09:11

## 2024-11-18 RX ADMIN — OXYCODONE AND ACETAMINOPHEN 1 TABLET: 10; 325 TABLET ORAL at 07:11

## 2024-11-18 RX ADMIN — HYOSCYAMINE SULFATE 0.12 MG: 0.12 TABLET SUBLINGUAL at 01:11

## 2024-11-18 RX ADMIN — OXYBUTYNIN CHLORIDE 5 MG: 5 TABLET ORAL at 02:11

## 2024-11-18 RX ADMIN — CARVEDILOL 50 MG: 12.5 TABLET, FILM COATED ORAL at 09:11

## 2024-11-18 RX ADMIN — HYOSCYAMINE SULFATE 0.12 MG: 0.12 TABLET SUBLINGUAL at 02:11

## 2024-11-18 RX ADMIN — HYDROCHLOROTHIAZIDE 25 MG: 25 TABLET ORAL at 09:11

## 2024-11-18 RX ADMIN — POLYETHYLENE GLYCOL 3350 17 G: 17 POWDER, FOR SOLUTION ORAL at 09:11

## 2024-11-18 RX ADMIN — PANTOPRAZOLE SODIUM 40 MG: 40 TABLET, DELAYED RELEASE ORAL at 06:11

## 2024-11-18 RX ADMIN — HYOSCYAMINE SULFATE 0.12 MG: 0.12 TABLET SUBLINGUAL at 09:11

## 2024-11-18 RX ADMIN — OXYCODONE AND ACETAMINOPHEN 1 TABLET: 10; 325 TABLET ORAL at 10:11

## 2024-11-18 RX ADMIN — TRAZODONE HYDROCHLORIDE 200 MG: 100 TABLET ORAL at 09:11

## 2024-11-18 RX ADMIN — OXYCODONE AND ACETAMINOPHEN 1 TABLET: 10; 325 TABLET ORAL at 11:11

## 2024-11-18 RX ADMIN — OXYCODONE AND ACETAMINOPHEN 1 TABLET: 10; 325 TABLET ORAL at 01:11

## 2024-11-18 RX ADMIN — FLUOXETINE 20 MG: 20 CAPSULE ORAL at 09:11

## 2024-11-18 RX ADMIN — SODIUM CHLORIDE, PRESERVATIVE FREE 10 ML: 5 INJECTION INTRAVENOUS at 06:11

## 2024-11-18 RX ADMIN — SODIUM CHLORIDE, PRESERVATIVE FREE 10 ML: 5 INJECTION INTRAVENOUS at 11:11

## 2024-11-18 RX ADMIN — GABAPENTIN 400 MG: 400 CAPSULE ORAL at 06:11

## 2024-11-18 RX ADMIN — OXYCODONE AND ACETAMINOPHEN 1 TABLET: 10; 325 TABLET ORAL at 02:11

## 2024-11-18 RX ADMIN — Medication 400 ML: at 06:11

## 2024-11-18 RX ADMIN — GABAPENTIN 400 MG: 400 CAPSULE ORAL at 09:11

## 2024-11-18 RX ADMIN — SODIUM CHLORIDE, PRESERVATIVE FREE 10 ML: 5 INJECTION INTRAVENOUS at 07:11

## 2024-11-18 RX ADMIN — SENNOSIDES AND DOCUSATE SODIUM 2 TABLET: 50; 8.6 TABLET ORAL at 09:11

## 2024-11-18 RX ADMIN — SODIUM CHLORIDE, PRESERVATIVE FREE 10 ML: 5 INJECTION INTRAVENOUS at 12:11

## 2024-11-18 RX ADMIN — GABAPENTIN 400 MG: 400 CAPSULE ORAL at 02:11

## 2024-11-18 RX ADMIN — Medication 400 ML: at 04:11
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
## 2024-11-18 NOTE — ANESTHESIA POSTPROCEDURE EVALUATION
Anesthesia Post Evaluation    Patient: Bianca Khan    Procedure(s) Performed: Procedure(s) (LRB):  CREATION, COLOSTOMY, LAPAROSCOPIC CONVERTED  TO OPEN (N/A)    Final Anesthesia Type: general      Patient location during evaluation: PACU  Patient participation: Yes- Able to Participate  Level of consciousness: awake and alert  Post-procedure vital signs: reviewed and stable  Pain management: adequate  Airway patency: patent      Anesthetic complications: no      Cardiovascular status: blood pressure returned to baseline  Respiratory status: unassisted  Hydration status: euvolemic  Follow-up not needed.            Event Time   Out of Recovery 11/06/2024 11:30:00         Pain/Adriana Score: Pain Rating Prior to Med Admin: 10 (11/18/2024 10:14 AM)  Pain Rating Post Med Admin: 3 (11/18/2024  2:24 AM)
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
## 2024-11-18 NOTE — CONSULTS
Inpatient Nutrition Assessment    Admit Date: 10/8/2024   Total duration of encounter: 41 days   Patient Age: 59 y.o.    Nutrition Recommendation/Prescription     -Continue 2000 Calorie Diabetic Diet as tolerated. Encourage po intake of meals.    -Continue Malachi BID for wound healing.  -Resume appetite stimulant once medically feasible.   -Monitor wt, labs, and intake.     Communication of Recommendations: reviewed with patient    Nutrition Assessment     Malnutrition Assessment/Nutrition-Focused Physical Exam    Malnutrition Context: acute illness or injury (10/17/24 1218)  Malnutrition Level: moderate (10/17/24 1218)  Energy Intake (Malnutrition):  (family denies) (10/17/24 1218)  Weight Loss (Malnutrition):  (does not meet criteria) (10/17/24 1218)  Subcutaneous Fat (Malnutrition): mild depletion (10/17/24 1218)     Upper Arm Region (Subcutaneous Fat Loss): mild depletion     Muscle Mass (Malnutrition): mild depletion (10/17/24 1218)     Clavicle Bone Region (Muscle Loss): mild depletion                    Fluid Accumulation (Malnutrition): mild (10/17/24 1218)        A minimum of two characteristics is recommended for diagnosis of either severe or non-severe malnutrition.    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 including ESBL E coli, Enterobacter, carbapenem resistant Pseudomonas, Enterococcus faecalis     Scheduled  Medications:  atorvastatin, 20 mg, Nightly  carvediloL, 50 mg, BID  doxycycline, 100 mg, Q12H  electrolytes-dextrose, 400 mL, BID AC  fenofibrate, 48 mg, Daily  FLUoxetine, 20 mg, Daily  gabapentin, 400 mg, Q8H  hydroCHLOROthiazide, 25 mg, Daily  hyoscyamine, 0.125 mg, Q4H  NIFEdipine, 90 mg, Daily  oxybutynin, 5 mg, TID  pantoprazole, 40 mg, BID AC  polyethylene glycol, 17 g, BID  senna-docusate 8.6-50 mg, 2 tablet, BID  sodium chloride 0.9%, 10 mL, Q6H    Continuous Infusions:       PRN Medications:   0.9%  NaCl infusion (for blood administration), , Q24H PRN  0.9%  NaCl infusion (for blood administration), , Q24H PRN  0.9%  NaCl infusion (for blood administration), , Q24H PRN  0.9%  NaCl infusion (for blood administration), , Q24H PRN  acetaminophen, 650 mg, Q4H PRN  albuterol-ipratropium, 3 mL, Q4H PRN  aluminum-magnesium hydroxide-simethicone, 30 mL, QID PRN  dextrose 10%, 12.5 g, PRN  dextrose 10%, 25 g, PRN  diphenhydrAMINE, 50 mg, Q6H PRN  etomidate, , Code/trauma/sedation Med  glucagon (human recombinant), 1 mg, PRN  guaiFENesin 100 mg/5 ml, 200 mg, Q4H PRN  hydrALAZINE, 20 mg, Q4H PRN  hydrOXYzine HCL, 25 mg, BID PRN  methocarbamoL, 750 mg, TID PRN  morphine, 2 mg, BID PRN  ondansetron, 4 mg, Q4H PRN  oxyCODONE-acetaminophen, 1 tablet, Q4H PRN  prochlorperazine, 5 mg, Q6H PRN  rocuronium, , Code/trauma/sedation Med  sodium chloride 0.9%, 10 mL, PRN  sodium chloride 0.9%, 10 mL, PRN  traZODone, 200 mg, Nightly PRN    Calorie Containing IV Medications: no significant kcals from medications at this time    Recent Labs   Lab 11/12/24  0605 11/12/24  1015 11/12/24  1753 11/13/24  0046 11/13/24  2154 11/14/24  0527 11/15/24  0657 11/16/24  0614 11/18/24  0534     --   --   --   --  135* 136 133* 136   K 4.4  --   --   --   --  4.0 4.1 4.3 4.7   CALCIUM 8.6*  --   --   --   --  8.4* 8.3* 8.3* 8.5*   PHOS  --   --   --   --   --  3.0  --   --   --    MG  --   --   --   --   --  1.40*  --   --   --       --   --   --   --  103 104 102 103   CO2 23  --   --   --   --  25 26 25 27   BUN 21.3  --   --   --   --  22.3 15.0 16.0 18.1   CREATININE 1.25  --   --   --   --  1.36* 1.09 1.15 1.23   EGFRNORACEVR >60  --   --   --   --  60 >60 >60 >60   GLUCOSE 95  --   --   --   --  102 88 88 95   BILITOT 0.9  --   --   --   --  0.6  --   --  0.6   ALKPHOS 114  --   --   --   --  78  --   --  81   ALT 7  --   --   --   --  8  --   --  5   AST 26  --   --   --   --  24  --   --  23   ALBUMIN 1.9*  --   --   --   --  1.7*  --   --  1.7*   WBC 7.19   < > 9.84 9.87 7.95 7.51 7.65 8.87 10.07   HGB 9.3*   < > 7.0* 9.0* 7.2* 7.4* 9.1* 10.0* 9.7*   HCT 29.1*   < > 21.5* 27.0* 21.6* 22.3* 26.7* 29.8* 29.9*    < > = values in this interval not displayed.     Nutrition Orders:  Diet Consistent Carbohydrate 2000 Calories (up to 75 gm per meal)  Dietary nutrition supplements BID; Malachi - Fruit Punch    Appetite/Oral Intake: poor/25-50% of meals  Factors Affecting Nutritional Intake: decreased appetite  Social Needs Impacting Access to Food: none identified  Food/Evangelical/Cultural Preferences: none reported  Food Allergies: none reported  Last Bowel Movement: 11/16/24  Wound(s):     Wound 11/15/24 0800 Moisture associated dermatitis Left posterior Greater trochanter-Tissue loss description: Full thickness       Altered Skin Integrity 06/28/23 2230 Right lateral Ankle #6-Tissue loss description: Full thickness       Wound 05/30/24 0000 Pressure Injury Right Buttocks-Tissue loss description: Full thickness       Altered Skin Integrity 01/09/24 0848 upper Sacral spine-Tissue loss description: Full thickness       Wound 08/22/24 0800 Other (comment) Left Heel-Tissue loss description: Full thickness    Comments    10/10: Pt was in surgery at time of visit. Having wound debridement with wound vac placement on Stage 4 wound of rt gluteus. Will add Malachi to assist with wound healing. Recommend vitamin regimen for wounds.     10/17/24 Patient  "transferred to ICU, on ventilator currently, no propofol. Spoke with patient's wife at bedside who reports patient was eating well with a good appetite prior to ICU transfer, she reports bringing him food from home, good intake of Malachi. Tube feeding recommendation provided.    10/18/24 Consult received for tube feeding, will order.    10/22/24: Per RN, pt did not want to really eat earlier; family present in the room and encouraging intake.     10/24/24: Per pt's family member, pt ate ~ half of breakfast. Pt denies n/v; would like a strawberry flavored oral supplement.     10/29/24: Pt still with decreased appetite; denies n/v; taking his oral supplements.     24: Pt with fair intake, eating a lot of outside foods; now on appetite stimulant; taking his Malachi; would only like Boost VHC once daily.     24: Per RN, taking in some; still on appetite stimulant.     24: Pt had colostomy creation yesterday; diet just advanced to clears.     24: Pt on regular diet and tolerating but not taking much in, less than half of his meals. Pt is just not hungry; denies n/v; states that he is taking the Malachi but not drinking the Boost and would like to cancel the Boost.     11/15/24: Pt still with poor appetite/intake; denies n/v; eating some outside foods being brought in by family; taking the Malachi.     24: Pt still not eating much of meals; eating some outside foods; taking Malachi.    Anthropometrics    Height: 5' 10" (177.8 cm), Height Method: Stated  Last Weight: 79.3 kg (174 lb 13.2 oz) (10/17/24 1212), Weight Method: Bed Scale  BMI (Calculated): 25.1  BMI Classification: normal (BMI 18.5-24.9)        Ideal Body Weight (IBW), Male: 166 lb     % Ideal Body Weight, Male (lb): 96.95 %                 Usual Body Weight (UBW), k.57 kg-77.11 kg  % Usual Body Weight: 109.5  % Weight Change From Usual Weight: 9.27 %  Usual Weight Provided By: family/caregiver    Wt Readings from Last 5 Encounters: "   10/17/24 79.3 kg (174 lb 13.2 oz)   08/27/24 63.5 kg (139 lb 15.9 oz)   06/04/24 71.2 kg (156 lb 15.5 oz)   01/30/24 78.5 kg (173 lb 1 oz)   01/10/24 78.5 kg (173 lb 1 oz)     Weight Change(s) Since Admission:   (10/17) took bed weight 79.3 kg during rounds (estimated 4 kg subtracted for equipment), increase noted  Wt Readings from Last 1 Encounters:   10/17/24 1212 79.3 kg (174 lb 13.2 oz)   10/09/24 0430 73 kg (160 lb 15 oz)   10/08/24 1719 63.5 kg (140 lb)   Admit Weight: 63.5 kg (140 lb) (10/08/24 1719), Weight Method: Stated  11/11/24: no new wt noted    Estimated Needs    Weight Used For Calorie Calculations: 79.3 kg (174 lb 13.2 oz)  Energy Calorie Requirements (kcal): 1983-2379 kcal (25-30 kcal/kg)  Energy Need Method: Kcal/kg  Weight Used For Protein Calculations: 79.3 kg (174 lb 13.2 oz)  Protein Requirements: 95 gm (1.2g/kg)  Fluid Requirements (mL): 1983 mL  CHO Requirement: 262-315 gm (45% EEN)  Last Updated: 10/22    Enteral Nutrition Patient not receiving enteral nutrition at this time.    Parenteral Nutrition Patient not receiving parenteral nutrition support at this time.    Evaluation of Received Nutrient Intake    Calories: not meeting estimated needs  Protein: not meeting estimated needs    Patient Education Not applicable.    Nutrition Diagnosis     PES: Inadequate energy intake related to inability to consume sufficient nutrients as evidenced by less than 80% needs met. (active)  PES: Moderate acute disease or injury related malnutrition related to acute illness as evidenced by mild fat depletion, mild muscle depletion, and edema. (active)    Nutrition Interventions     Intervention(s): modified composition of enteral nutrition, modified rate of enteral nutrition, and collaboration with other providers  Goal: Meet greater than 80% of nutritional needs by follow-up. (goal progressing)  Goal: Tolerate enteral feeding at goal rate by follow-up. (goal discontinued)    Nutrition Goals & Monitoring      Dietitian will monitor: food and beverage intake, energy intake, weight, and electrolyte/renal panel  Discharge planning:  Diabetic Diet with Malachi BID   Nutrition Risk/Follow-Up: high (follow-up in 1-4 days)   Please consult if re-assessment needed sooner.

## 2024-11-18 NOTE — PLAN OF CARE
TCU declined. Called and spoke to pt's wife for new SNF choice. She asked for referral to be sent to [facility]. Referral sent via [facility].

## 2024-11-18 NOTE — PLAN OF CARE
Red rubber removed from ostomy which remains pink, patent, and productive.GI bleed resolved. Tolerating PO intake.  No further indication for acute surgical intervention. Will remove staples before discharge. Please call surgery team before he leaves.

## 2024-11-18 NOTE — PROGRESS NOTES
Infectious Diseases Progress Note  60 y/o male with past medical history of T-spine cord injury with paraplegia, diabetes, HTN, hepatitis-C, chronic MRSA L ankle wound/osteomyelitis, was on suppressive doxycycline and R knee infection/septic arthritis and osteomyelitis with GBS/Enterococcus and Ecoli isolates who presented to ER on 10/8 with increased generalized pain, increased foul smelling drainage from sacral wound with fever and chills. He was noted to be hypotensive per EMS. On admit he was afebrile without leukocytosis. ESR 98 and .80. MRSA PCR (-). U/A with 21-50 WBC, 0-5 RBC, 75 LE, no bacteria, negative nitrites. Urine culture negative. Blood cultures negative. Sacral wound culture with many Acinetobacter, many ESBL Ecoli, moderate Enterococcus, Bacteroides and Peptoniphilus. CT pelvis with contrast showed worsening inflammatory change of the sacral decubitus ulcers with gas now identified inferior to the right pubic ramus, stool noted throughout the colon as may be seen with constipation, pyelonephritis is not excluded. Seen by Surgery and underwent debridement of sacral wound. During his stay he was noted to have large amounts of bleeding from sacral wound. CT abdomen/pelvis on 10/17 showed interval development of L renal rupture with large subcapsular hematoma, L retroperitoneal hemorrhage. He received multiple blood/blood products over the past couple days. He was noted to be hypotensive and was transferred to ICU on 10/17. He was seen by Vascular and underwent Aortogram,  left renal angiography with coil embolization of left renal artery and right groin central venous line insertion on 10/17. Sputum culture on 108/23 revealing Pseudomonas. RUE venous ultrasound positive for deep  vein thrombosis in brachial and radial veins of the right upper extremity. Sputum culture with MDR Pseudomonas. S/P colostomy placement on 11/6.  He is currently on chronic oral suppression with  Doxycycline    Subjective:  Sleeping in bed, no acute distress. Currently on 1L NC. Afebrile. Wife sleeping at bedside    Review of Systems   Constitutional:  Positive for malaise/fatigue.   HENT: Negative.     Eyes: Negative.    Respiratory:  Positive for shortness of breath.    Cardiovascular: Negative.    Gastrointestinal: Negative.    Musculoskeletal: Negative.    Skin: Negative.    Neurological:  Positive for focal weakness and weakness.   All other systems reviewed and are negative.      Review of patient's allergies indicates:   Allergen Reactions    Baclofen Itching and Anxiety       Past Medical History:   Diagnosis Date    Arthritis     Chronic ulcer of ankle 05/26/2022    ESBL (extended spectrum beta-lactamase) producing bacteria infection 08/30/2024    Frequent UTI 07/02/2019    Generalized anxiety disorder 05/26/2022    Neurogenic bladder 05/26/2022    Osteomyelitis 05/26/2022    Paraplegia     Presence of suprapubic catheter 05/26/2022    Pure hypercholesterolemia 05/26/2022    Retention of urine, unspecified 08/09/2019    Spinal cord injury at T1-T6 level 04/20/2018       Past Surgical History:   Procedure Laterality Date    ANGIOGRAM, RENAL ARTERIES, BILATERAL N/A 10/17/2024    Procedure: Angiogram, Renal Arteries, Bilateral;  Surgeon: Pati King MD;  Location: [facility] CATH LAB;  Service: Peripheral Vascular;  Laterality: N/A;  left renal artery coiling    APPLICATION OF WOUND VACUUM-ASSISTED CLOSURE DEVICE N/A 10/10/2024    Procedure: APPLICATION, WOUND VAC;  Surgeon: Rob Sinclair MD;  Location: [facility];  Service: General;  Laterality: N/A;    COLONOSCOPY N/A 11/13/2024    Procedure: COLON;  Surgeon: Thanh Olson MD;  Location: [facility] ENDOSCOPY;  Service: Gastroenterology;  Laterality: N/A;  though ostomy    CREATION, COLOSTOMY, LAPAROSCOPIC N/A 11/6/2024    Procedure: CREATION, COLOSTOMY, LAPAROSCOPIC CONVERTED  TO OPEN;  Surgeon: Rob Sinclair MD;  Location: [facility];  Service:  General;  Laterality: N/A;    EGD, WITH CLOSED BIOPSY N/A 8/29/2024    Procedure: EGD, WITH CLOSED BIOPSY;  Surgeon: Mikal Segovia MD;  Location: [facility] ENDOSCOPY;  Service: Gastroenterology;  Laterality: N/A;    ESOPHAGOGASTRODUODENOSCOPY N/A 8/29/2024    Procedure: EGD;  Surgeon: Mikal Segovia MD;  Location: [facility] ENDOSCOPY;  Service: Gastroenterology;  Laterality: N/A;    FRACTURE SURGERY  2021    INCISION AND DRAINAGE, LOWER EXTREMITY Right 06/29/2023    Procedure: INCISION AND DRAINAGE, LOWER EXTREMITY;  Surgeon: Prabhu Shen DO;  Location: [facility];  Service: Orthopedics;  Laterality: Right;  supine bone foam wash stuff cultures    INCISION AND DRAINAGE, LOWER EXTREMITY Right 11/30/2023    Procedure: INCISION AND DRAINAGE, LOWER EXTREMITY;  Surgeon: Prabhu Shen DO;  Location: [facility];  Service: Orthopedics;  Laterality: Right;  supine any table bone foam wash stuff possible wound vac    INCISION AND DRAINAGE, LOWER EXTREMITY Right 12/1/2023    Procedure: INCISION AND DRAINAGE, LOWER EXTREMITY;  Surgeon: Prabhu Shen DO;  Location: [facility];  Service: Orthopedics;  Laterality: Right;  supine bone foam vascular bed removal of distal femoral plate, wash stuff, possible AKA    INSERTION OF INTRAMEDULLARY MARISA Right 08/10/2022    Procedure: INSERTION, INTRAMEDULLARY MARISA RIGHT TIBIA;  Surgeon: Jorge Orellana MD;  Location: [facility];  Service: Orthopedics;  Laterality: Right;    JOINT REPLACEMENT  2021    Ankel    PRESSURE ULCER DEBRIDEMENT N/A 10/10/2024    Procedure: DEBRIDEMENT, PRESSURE ULCER;  Surgeon: Rob Sinclair MD;  Location: [facility];  Service: General;  Laterality: N/A;  sacral wound, R buttock wound    REMOVAL OF HARDWARE FROM LOWER EXTREMITY Right 12/1/2023    Procedure: REMOVAL, HARDWARE, LOWER EXTREMITY;  Surgeon: Prabhu Shen DO;  Location: [facility];  Service: Orthopedics;  Laterality: Right;    SPINE SURGERY  March 1st 2018       Social History     Socioeconomic History    Marital  status:    Tobacco Use    Smoking status: Some Days     Current packs/day: 0.00     Average packs/day: 0.2 packs/day for 41.5 years (10.4 ttl pk-yrs)     Types: Cigars, Cigarettes     Start date: 1982     Last attempt to quit: 2023     Years since quittin.3    Smokeless tobacco: Never   Substance and Sexual Activity    Alcohol use: Not Currently     Alcohol/week: 2.0 standard drinks of alcohol     Types: 2 Cans of beer per week    Drug use: Not Currently     Types: Oxycodone    Sexual activity: Not Currently     Partners: Female     Birth control/protection: None     Social Drivers of Health     Financial Resource Strain: Low Risk  (10/10/2024)    Overall Financial Resource Strain (CARDIA)     Difficulty of Paying Living Expenses: Not hard at all   Food Insecurity: No Food Insecurity (10/10/2024)    Hunger Vital Sign     Worried About Running Out of Food in the Last Year: Never true     Ran Out of Food in the Last Year: Never true   Transportation Needs: No Transportation Needs (10/10/2024)    TRANSPORTATION NEEDS     Transportation : No   Physical Activity: Inactive (2023)    Exercise Vital Sign     Days of Exercise per Week: 0 days     Minutes of Exercise per Session: 0 min   Stress: No Stress Concern Present (10/10/2024)    [employer] - Occupational Stress Questionnaire     Feeling of Stress : Only a little   Housing Stability: Low Risk  (10/10/2024)    Housing Stability Vital Sign     Unable to Pay for Housing in the Last Year: No     Homeless in the Last Year: No       Scheduled Meds:   atorvastatin  20 mg Oral Nightly    carvediloL  50 mg Oral BID    doxycycline  100 mg Oral Q12H    electrolytes-dextrose  400 mL Oral BID AC    fenofibrate  48 mg Oral Daily    FLUoxetine  20 mg Oral Daily    gabapentin  400 mg Oral Q8H    hydroCHLOROthiazide  25 mg Oral Daily    hyoscyamine  0.125 mg Sublingual Q4H    NIFEdipine  90 mg Oral Daily    oxybutynin  5 mg Per  "NG tube TID    pantoprazole  40 mg Oral BID AC    polyethylene glycol  17 g Oral BID    senna-docusate 8.6-50 mg  2 tablet Oral BID    sodium chloride 0.9%  10 mL Intravenous Q6H     Continuous Infusions:        PRN Meds:  Current Facility-Administered Medications:     0.9%  NaCl infusion (for blood administration), , Intravenous, Q24H PRN    0.9%  NaCl infusion (for blood administration), , Intravenous, Q24H PRN    0.9%  NaCl infusion (for blood administration), , Intravenous, Q24H PRN    0.9%  NaCl infusion (for blood administration), , Intravenous, Q24H PRN    acetaminophen, 650 mg, Oral, Q4H PRN    albuterol-ipratropium, 3 mL, Nebulization, Q4H PRN    aluminum-magnesium hydroxide-simethicone, 30 mL, Oral, QID PRN    dextrose 10%, 12.5 g, Intravenous, PRN    dextrose 10%, 25 g, Intravenous, PRN    diphenhydrAMINE, 50 mg, Intravenous, Q6H PRN    etomidate, , Intravenous, Code/trauma/sedation Med    glucagon (human recombinant), 1 mg, Intramuscular, PRN    guaiFENesin 100 mg/5 ml, 200 mg, Oral, Q4H PRN    hydrALAZINE, 20 mg, Intravenous, Q4H PRN    hydrOXYzine HCL, 25 mg, Oral, BID PRN    methocarbamoL, 750 mg, Oral, TID PRN    morphine, 2 mg, Intravenous, BID PRN    ondansetron, 4 mg, Intravenous, Q4H PRN    oxyCODONE-acetaminophen, 1 tablet, Oral, Q4H PRN    prochlorperazine, 5 mg, Intravenous, Q6H PRN    rocuronium, , Intravenous, Code/trauma/sedation Med    sodium chloride 0.9%, 10 mL, Intravenous, PRN    Flushing PICC/Midline Protocol, , , Until Discontinued **AND** sodium chloride 0.9%, 10 mL, Intravenous, Q6H **AND** sodium chloride 0.9%, 10 mL, Intravenous, PRN    traZODone, 200 mg, Oral, Nightly PRN    Objective:  /82   Pulse 80   Temp 97.5 °F (36.4 °C) (Oral)   Resp 18   Ht 5' 10" (1.778 m)   Wt 79.3 kg (174 lb 13.2 oz)   SpO2 99%   BMI 25.08 kg/m²     Physical Exam:   Physical Exam  Vitals reviewed.   HENT:      Head: Normocephalic.   Cardiovascular:      Rate and Rhythm: Normal rate and " regular rhythm.   Pulmonary:      Effort: Pulmonary effort is normal. No respiratory distress.      Breath sounds: Normal breath sounds. No wheezing.      Comments: Currently on 1L NC  Abdominal:      General: Bowel sounds are normal. There is no distension.      Palpations: Abdomen is soft.      Tenderness: There is no abdominal tenderness.   Genitourinary:     Comments: Suprapubic catheter  Musculoskeletal:      Cervical back: Normal range of motion.      Comments: Paraplegia   Skin:     Findings: No rash.      Comments: Wounds dressed. R chest tunneled central line   Neurological:      Mental Status: He is alert and oriented to person, place, and time.   Psychiatric:         Behavior: Behavior normal.     Imaging    Lab Review   Recent Results (from the past 24 hours)   Comprehensive Metabolic Panel    Collection Time: 11/18/24  5:34 AM   Result Value Ref Range    Sodium 136 136 - 145 mmol/L    Potassium 4.7 3.5 - 5.1 mmol/L    Chloride 103 98 - 107 mmol/L    CO2 27 23 - 31 mmol/L    Glucose 95 82 - 115 mg/dL    Blood Urea Nitrogen 18.1 8.4 - 25.7 mg/dL    Creatinine 1.23 0.72 - 1.25 mg/dL    Calcium 8.5 (L) 8.8 - 10.0 mg/dL    Protein Total 6.2 5.8 - 7.6 gm/dL    Albumin 1.7 (L) 3.4 - 4.8 g/dL    Globulin 4.5 (H) 2.4 - 3.5 gm/dL    Albumin/Globulin Ratio 0.4 (L) 1.1 - 2.0 ratio    Bilirubin Total 0.6 <=1.5 mg/dL    ALP 81 40 - 150 unit/L    ALT 5 0 - 55 unit/L    AST 23 5 - 34 unit/L    eGFR >60 mL/min/1.73/m2    Anion Gap 6.0 mEq/L    BUN/Creatinine Ratio 15    CBC with Differential    Collection Time: 11/18/24  5:34 AM   Result Value Ref Range    WBC 10.07 4.50 - 11.50 x10(3)/mcL    RBC 3.25 (L) 4.70 - 6.10 x10(6)/mcL    Hgb 9.7 (L) 14.0 - 18.0 g/dL    Hct 29.9 (L) 42.0 - 52.0 %    MCV 92.0 80.0 - 94.0 fL    MCH 29.8 27.0 - 31.0 pg    MCHC 32.4 (L) 33.0 - 36.0 g/dL    RDW 16.3 11.5 - 17.0 %    Platelet 448 (H) 130 - 400 x10(3)/mcL    MPV 8.8 7.4 - 10.4 fL    Neut % 56.6 %    Lymph % 32.4 %    Mono % 9.8 %     Eos % 0.5 %    Basophil % 0.2 %    Lymph # 3.26 0.6 - 4.6 x10(3)/mcL    Neut # 5.70 2.1 - 9.2 x10(3)/mcL    Mono # 0.99 0.1 - 1.3 x10(3)/mcL    Eos # 0.05 0 - 0.9 x10(3)/mcL    Baso # 0.02 <=0.2 x10(3)/mcL    IG# 0.05 (H) 0 - 0.04 x10(3)/mcL    IG% 0.5 %    NRBC% 0.0 %   POCT glucose    Collection Time: 11/18/24 11:10 AM   Result Value Ref Range    POCT Glucose 96 70 - 110 mg/dL       Assessment/Plan:  Sepsis - resolved  Sacral wound infection - CR Acinetobacter / ESBL Ecoli / Enterococcus / Bacteroides / Peptoniphilus isolates  Pneumonitis / Pleural effusions  MDR Pseudomonas (+) sputum  ANTON  Pyelonephritis per CT   Constipation  Paraplegia  Neurogenic bladder with suprapubic catheter  DM Type II  MRSA L ankle osteomyelitis with retained hardware  Anemia    -On chronic suppressive antibiotic therapy with oral Doxycycline  -Remains afebrile without leukocytosis, follow  -Received multiple units of blood and blood products during his stay  -Urology consult for leakage around the suprapubic catheter, inputs noted  -Currently on 1L NC, wean as tolerated  -Repeat CXR on 10/26 with pulmonary edema, bilateral effusions, lower lobe atelectasis unchanged.   -CXR on 10/21 with B/L pleural effusions with associated atelectasis and/or infiltrate  -Sputum culture on 10/23 with moderate Pseudomonas species, follow  -Pt with significant anemia, CT abdomen/pelvis showed L renal rupture with large subcapsular hematoma  -Seen by Vascular, inputs noted. S/P aortogram, left renal angiography with coil embolization of left renal artery on 10/17  -CT pelvis with contrast showed worsening inflammatory change of sacral ulcer with gas inferior to the R pubic ramus  -S/P debridement of sacral wound on 10/10  -Sacral wound cultures revealing CR Acinetobacter, ESBL Ecoli, Enterococcus, Bacteroides, Peptoniphilus isolates  -Continue wound care  -Surgery following. S/P colostomy placement on 11/6  -Blood cultures negative   -Discussed with  nursing staff. CM working on SNF placement

## 2024-11-18 NOTE — PT/OT/SLP PROGRESS
Attempt to see for OT tx, however, pt reports just received pain meds and wants to wait. Will f/u as schedule permits.

## 2024-11-19 DIAGNOSIS — Z93.9 HISTORY OF CREATION OF OSTOMY: Primary | ICD-10-CM

## 2024-11-19 PROBLEM — L89.514: Chronic | Status: ACTIVE | Noted: 2024-11-19

## 2024-11-19 PROBLEM — L89.314 PRESSURE ULCER OF RIGHT BUTTOCK, STAGE 4: Chronic | Status: ACTIVE | Noted: 2024-11-19

## 2024-11-19 PROBLEM — L89.624 PRESSURE ULCER OF LEFT HEEL, STAGE 4: Chronic | Status: ACTIVE | Noted: 2024-11-19

## 2024-11-19 PROBLEM — L89.322 PRESSURE ULCER OF LEFT BUTTOCK, STAGE 2: Status: ACTIVE | Noted: 2024-11-19

## 2024-11-19 PROBLEM — L89.154 PRESSURE ULCER OF SACRAL REGION, STAGE 4: Chronic | Status: ACTIVE | Noted: 2024-05-30

## 2024-11-19 LAB
GROUP & RH: NORMAL
INDIRECT COOMBS: NORMAL
POCT GLUCOSE: 103 MG/DL (ref 70–110)
PREALB SERPL-MCNC: 9.6 MG/DL (ref 16–42)
SPECIMEN OUTDATE: NORMAL

## 2024-11-19 PROCEDURE — A4216 STERILE WATER/SALINE, 10 ML: HCPCS | Performed by: STUDENT IN AN ORGANIZED HEALTH CARE EDUCATION/TRAINING PROGRAM

## 2024-11-19 PROCEDURE — 25000003 PHARM REV CODE 250

## 2024-11-19 PROCEDURE — 21400001 HC TELEMETRY ROOM

## 2024-11-19 PROCEDURE — 94799 UNLISTED PULMONARY SVC/PX: CPT

## 2024-11-19 PROCEDURE — 25000003 PHARM REV CODE 250: Performed by: INTERNAL MEDICINE

## 2024-11-19 PROCEDURE — 25000003 PHARM REV CODE 250: Performed by: HOSPITALIST

## 2024-11-19 PROCEDURE — 27000221 HC OXYGEN, UP TO 24 HOURS

## 2024-11-19 PROCEDURE — 63600175 PHARM REV CODE 636 W HCPCS: Performed by: STUDENT IN AN ORGANIZED HEALTH CARE EDUCATION/TRAINING PROGRAM

## 2024-11-19 PROCEDURE — 99223 1ST HOSP IP/OBS HIGH 75: CPT | Mod: ,,, | Performed by: EMERGENCY MEDICINE

## 2024-11-19 PROCEDURE — 25000003 PHARM REV CODE 250: Performed by: STUDENT IN AN ORGANIZED HEALTH CARE EDUCATION/TRAINING PROGRAM

## 2024-11-19 PROCEDURE — 97530 THERAPEUTIC ACTIVITIES: CPT | Mod: CQ

## 2024-11-19 PROCEDURE — 97535 SELF CARE MNGMENT TRAINING: CPT

## 2024-11-19 PROCEDURE — 25000003 PHARM REV CODE 250: Performed by: NURSE PRACTITIONER

## 2024-11-19 PROCEDURE — 36415 COLL VENOUS BLD VENIPUNCTURE: CPT | Performed by: NURSE PRACTITIONER

## 2024-11-19 PROCEDURE — 99223 1ST HOSP IP/OBS HIGH 75: CPT | Mod: ,,, | Performed by: INTERNAL MEDICINE

## 2024-11-19 PROCEDURE — 11000001 HC ACUTE MED/SURG PRIVATE ROOM

## 2024-11-19 PROCEDURE — 84134 ASSAY OF PREALBUMIN: CPT | Performed by: EMERGENCY MEDICINE

## 2024-11-19 PROCEDURE — 97110 THERAPEUTIC EXERCISES: CPT | Mod: CQ

## 2024-11-19 PROCEDURE — 99497 ADVNCD CARE PLAN 30 MIN: CPT | Mod: 25,,, | Performed by: INTERNAL MEDICINE

## 2024-11-19 PROCEDURE — 36415 COLL VENOUS BLD VENIPUNCTURE: CPT | Performed by: EMERGENCY MEDICINE

## 2024-11-19 PROCEDURE — 27000207 HC ISOLATION

## 2024-11-19 PROCEDURE — 86901 BLOOD TYPING SEROLOGIC RH(D): CPT | Performed by: NURSE PRACTITIONER

## 2024-11-19 RX ADMIN — PANTOPRAZOLE SODIUM 40 MG: 40 TABLET, DELAYED RELEASE ORAL at 05:11

## 2024-11-19 RX ADMIN — SODIUM CHLORIDE, PRESERVATIVE FREE 10 ML: 5 INJECTION INTRAVENOUS at 11:11

## 2024-11-19 RX ADMIN — Medication 400 ML: at 04:11

## 2024-11-19 RX ADMIN — OXYBUTYNIN CHLORIDE 5 MG: 5 TABLET ORAL at 02:11

## 2024-11-19 RX ADMIN — OXYBUTYNIN CHLORIDE 5 MG: 5 TABLET ORAL at 09:11

## 2024-11-19 RX ADMIN — OXYCODONE AND ACETAMINOPHEN 1 TABLET: 10; 325 TABLET ORAL at 05:11

## 2024-11-19 RX ADMIN — NIFEDIPINE 90 MG: 90 TABLET, FILM COATED, EXTENDED RELEASE ORAL at 10:11

## 2024-11-19 RX ADMIN — PANTOPRAZOLE SODIUM 40 MG: 40 TABLET, DELAYED RELEASE ORAL at 04:11

## 2024-11-19 RX ADMIN — SENNOSIDES AND DOCUSATE SODIUM 2 TABLET: 50; 8.6 TABLET ORAL at 09:11

## 2024-11-19 RX ADMIN — HYDROCHLOROTHIAZIDE 25 MG: 25 TABLET ORAL at 10:11

## 2024-11-19 RX ADMIN — ATORVASTATIN CALCIUM 20 MG: 10 TABLET, FILM COATED ORAL at 09:11

## 2024-11-19 RX ADMIN — GABAPENTIN 400 MG: 400 CAPSULE ORAL at 02:11

## 2024-11-19 RX ADMIN — POLYETHYLENE GLYCOL 3350 17 G: 17 POWDER, FOR SOLUTION ORAL at 09:11

## 2024-11-19 RX ADMIN — HYOSCYAMINE SULFATE 0.12 MG: 0.12 TABLET SUBLINGUAL at 05:11

## 2024-11-19 RX ADMIN — HYOSCYAMINE SULFATE 0.12 MG: 0.12 TABLET SUBLINGUAL at 10:11

## 2024-11-19 RX ADMIN — SODIUM CHLORIDE, PRESERVATIVE FREE 10 ML: 5 INJECTION INTRAVENOUS at 06:11

## 2024-11-19 RX ADMIN — SODIUM CHLORIDE, PRESERVATIVE FREE 10 ML: 5 INJECTION INTRAVENOUS at 05:11

## 2024-11-19 RX ADMIN — OXYCODONE AND ACETAMINOPHEN 1 TABLET: 10; 325 TABLET ORAL at 09:11

## 2024-11-19 RX ADMIN — FENOFIBRATE 48 MG: 48 TABLET, FILM COATED ORAL at 10:11

## 2024-11-19 RX ADMIN — GABAPENTIN 400 MG: 400 CAPSULE ORAL at 09:11

## 2024-11-19 RX ADMIN — Medication 400 ML: at 05:11

## 2024-11-19 RX ADMIN — METHOCARBAMOL 750 MG: 750 TABLET ORAL at 05:11

## 2024-11-19 RX ADMIN — FLUOXETINE 20 MG: 20 CAPSULE ORAL at 10:11

## 2024-11-19 RX ADMIN — HYOSCYAMINE SULFATE 0.12 MG: 0.12 TABLET SUBLINGUAL at 09:11

## 2024-11-19 RX ADMIN — HYOSCYAMINE SULFATE 0.12 MG: 0.12 TABLET SUBLINGUAL at 02:11

## 2024-11-19 RX ADMIN — MORPHINE SULFATE 2 MG: 4 INJECTION, SOLUTION INTRAMUSCULAR; INTRAVENOUS at 10:11

## 2024-11-19 RX ADMIN — CARVEDILOL 50 MG: 12.5 TABLET, FILM COATED ORAL at 10:11

## 2024-11-19 RX ADMIN — CARVEDILOL 50 MG: 12.5 TABLET, FILM COATED ORAL at 09:11

## 2024-11-19 RX ADMIN — GABAPENTIN 400 MG: 400 CAPSULE ORAL at 05:11

## 2024-11-19 RX ADMIN — POLYETHYLENE GLYCOL 3350 17 G: 17 POWDER, FOR SOLUTION ORAL at 10:11

## 2024-11-19 RX ADMIN — DOXYCYCLINE HYCLATE 100 MG: 100 TABLET, COATED ORAL at 09:11

## 2024-11-19 RX ADMIN — METHOCARBAMOL 750 MG: 750 TABLET ORAL at 12:11

## 2024-11-19 RX ADMIN — OXYCODONE AND ACETAMINOPHEN 1 TABLET: 10; 325 TABLET ORAL at 11:11

## 2024-11-19 RX ADMIN — DOXYCYCLINE HYCLATE 100 MG: 100 TABLET, COATED ORAL at 10:11

## 2024-11-19 RX ADMIN — OXYBUTYNIN CHLORIDE 5 MG: 5 TABLET ORAL at 10:11

## 2024-11-19 NOTE — PT/OT/SLP PROGRESS
Occupational Therapy   Treatment    Name: Bianca Khan  MRN: [id number]  Admitting Diagnosis:  Sacral wound  6 Days Post-Op    Recommendations:     Recommended therapy intensity at discharge: Moderate Intensity Therapy   Discharge Equipment Recommendations:  to be determined by next level of care  Barriers to discharge:       Assessment:     Bianca Khan is a 59 y.o. male with a medical diagnosis of Sacral wound.  He presents with the following performance deficits affecting function are weakness, impaired endurance, impaired self care skills, gait instability, impaired functional mobility, impaired balance.   Limited by increased dizziness upon sitting EOB today, BP WNL. See below.     Rehab Prognosis:  Good; patient would benefit from acute skilled OT services to address these deficits and reach maximum level of function.       Plan:     Patient to be seen 3 x/week to address the above listed problems via self-care/home management, therapeutic activities, therapeutic exercises  Plan of Care Expires: 12/11/24  Plan of Care Reviewed with: patient    Subjective     Pain/Comfort:  Pain Rating 1: 0/10    Objective:     Communicated with: nrsg prior to session.  Patient found HOB elevated with   upon OT entry to room.    General Precautions: Standard, contact    Orthopedic Precautions:   Braces:    Respiratory Status:   Vital Signs: Blood Pressure: 115/81  With Activity: 129/91     Occupational Performance:     Bed Mobility:    Patient completed Supine to Sit with moderate assistance  Patient completed Sit to Supine with moderate assistance     Functional Mobility/Transfers:  Sat EOB x5min with CGA/min A trunk balance   Lateral scooting in prep for functional mobility completed x4 scoots before having to lay back down 2/2 c/o dizziness       Patient Education:  Patient provided with verbal education education regarding OT role/goals/POC, fall prevention, safety awareness, and pressure ulcer prevention.  Understanding  was verbalized.      Patient left right sidelying with all lines intact, call button in reach, wedge under L side, pressure relief boots, and nrsg present.    GOALS:   Multidisciplinary Problems       Occupational Therapy Goals          Problem: Occupational Therapy    Goal Priority Disciplines Outcome Interventions   Occupational Therapy Goal     OT, PT/OT Progressing    Description: Goals to be met by: in 1 month      Patient will increase functional independence with ADLs by performing:    UE Dressing with Supervision.  LE Dressing with Minimal Assistance.  Grooming while EOB with [place].  Increased functional strength to 5/5 for BUE and increasing indep in functional mobility.  Perform functional transfers with min A.                          Time Tracking:     OT Date of Treatment:    OT Start Time: 0943  OT Stop Time: 1004  OT Total Time (min): 21 min    Billable Minutes:Self Care/Home Management 21 min     OT/ANNABEL: OT     Number of ANNABEL visits since last OT visit: 1 11/19/2024
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
## 2024-11-19 NOTE — PT/OT/SLP PROGRESS
Physical Therapy Treatment    Patient Name:  Bianca Khan   MRN:  [id number]    Recommendations:     Discharge therapy intensity: Moderate Intensity Therapy   Discharge Equipment Recommendations: to be determined by next level of care  Barriers to discharge: Decreased caregiver support, Impaired mobility, and Ongoing medical needs    Assessment:     Bianca Khan is a 59 y.o. male admitted with a medical diagnosis of Sacral wound , hx para (2018), IVC filter, GIB, resp failure.  He presents with the following impairments/functional limitations: weakness, impaired endurance, impaired self care skills, impaired functional mobility, gait instability, impaired balance, decreased lower extremity function, impaired sensation, impaired skin .    Pt limited 2/2 dizziness while sitting EOB. NP assessed where it was WNL. Pt unable to cont 2/2 dizziness not subsiding.     Rehab Prognosis: Good; patient would benefit from acute skilled PT services to address these deficits and reach maximum level of function.    Recent Surgery: Procedure(s) (LRB):  COLON (N/A) 6 Days Post-Op    Plan:     During this hospitalization, patient would benefit from acute PT services 3 x/week to address the identified rehab impairments via therapeutic activities, therapeutic exercises, neuromuscular re-education, wheelchair management/training and progress toward the following goals:    Plan of Care Expires:  12/11/24    Subjective     Chief Complaint:   Patient/Family Comments/goals:   Pain/Comfort:  Pain Rating 1: 0/10      Objective:     Communicated with NSG prior to session.  Patient found HOB elevated with wound vac, colostomy, Other (comments), central line (subrapubic catheter) upon PT entry to room.     General Precautions: Standard, contact (BUE limb alerts)  Orthopedic Precautions: N/A  Braces: N/A  Respiratory Status: Nasal cannula, flow 2 L/min  Blood Pressure:   Skin Integrity: Visible skin intact      Functional Mobility:  Bed  Mobility:     Scooting: minimum assistance and moderate assistance  Supine to Sit: moderate assistance  Sit to Supine: moderate assistance  Stat sitting: pt sat EOB CGA-Félix for roughly 8min. Slight posterior lean     Therapeutic Activities/Exercises:  BLE stretching: hip adductors, gastrocs, hamstrings. 30 sec 2x  BLE hip/knee flex/ext, 12 reps     Done to decrease muscle tension , completed in supine.     Patient left HOB elevated with all lines intact, call button in reach, wedge under L side, pressure relief boots, and nurse present    GOALS:   Multidisciplinary Problems       Physical Therapy Goals          Problem: Physical Therapy    Goal Priority Disciplines Outcome Interventions   Physical Therapy Goal     PT, PT/OT Progressing    Description: Goals to be met by: 24     Patient will increase functional independence with mobility by performin. Supine to sit with Stand-by Assistance  2. Sit to supine with Stand-by Assistance  3. Bed to chair transfer with Stand-by Assistance using Slideboard  4. Wheelchair propulsion x 150 feet with Supervision using bilateral upper extremities                       Time Tracking:     PT Received On: 24  PT Start Time: 0942     PT Stop Time: 1005  PT Total Time (min): 23 min     Billable Minutes: Therapeutic Activity 13 and Therapeutic Exercise 10    Treatment Type: Treatment  PT/PTA: PTA     Number of PTA visits since last PT visit: 2     2024
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
## 2024-11-19 NOTE — PLAN OF CARE
Message received from April [facility] that [facility] willing to f/u but will not be able to accept until pt able to participate in therapy more.  I spoke to pt's wife and she said she left more SNF choices in the room.  1540 I went to see pt and to get the snf choices. Dr Waller (palliative) doing consult. Pt told me he would like to discuss choices with his sister tonight. I will f/u tomorrow with pt.

## 2024-11-19 NOTE — SUBJECTIVE & OBJECTIVE
Scheduled Meds:   atorvastatin  20 mg Oral Nightly    carvediloL  50 mg Oral BID    doxycycline  100 mg Oral Q12H    electrolytes-dextrose  400 mL Oral BID AC    fenofibrate  48 mg Oral Daily    FLUoxetine  20 mg Oral Daily    gabapentin  400 mg Oral Q8H    hydroCHLOROthiazide  25 mg Oral Daily    hyoscyamine  0.125 mg Sublingual Q4H    NIFEdipine  90 mg Oral Daily    oxybutynin  5 mg Per NG tube TID    pantoprazole  40 mg Oral BID AC    polyethylene glycol  17 g Oral BID    senna-docusate 8.6-50 mg  2 tablet Oral BID    sodium chloride 0.9%  10 mL Intravenous Q6H     Continuous Infusions:  PRN Meds:  Current Facility-Administered Medications:     0.9%  NaCl infusion (for blood administration), , Intravenous, Q24H PRN    0.9%  NaCl infusion (for blood administration), , Intravenous, Q24H PRN    0.9%  NaCl infusion (for blood administration), , Intravenous, Q24H PRN    0.9%  NaCl infusion (for blood administration), , Intravenous, Q24H PRN    acetaminophen, 650 mg, Oral, Q4H PRN    albuterol-ipratropium, 3 mL, Nebulization, Q4H PRN    aluminum-magnesium hydroxide-simethicone, 30 mL, Oral, QID PRN    dextrose 10%, 12.5 g, Intravenous, PRN    dextrose 10%, 25 g, Intravenous, PRN    diphenhydrAMINE, 50 mg, Intravenous, Q6H PRN    etomidate, , Intravenous, Code/trauma/sedation Med    glucagon (human recombinant), 1 mg, Intramuscular, PRN    guaiFENesin 100 mg/5 ml, 200 mg, Oral, Q4H PRN    hydrALAZINE, 20 mg, Intravenous, Q4H PRN    hydrOXYzine HCL, 25 mg, Oral, BID PRN    methocarbamoL, 750 mg, Oral, TID PRN    morphine, 2 mg, Intravenous, BID PRN    ondansetron, 4 mg, Intravenous, Q4H PRN    oxyCODONE-acetaminophen, 1 tablet, Oral, Q4H PRN    prochlorperazine, 5 mg, Intravenous, Q6H PRN    rocuronium, , Intravenous, Code/trauma/sedation Med    sodium chloride 0.9%, 10 mL, Intravenous, PRN    Flushing PICC/Midline Protocol, , , Until Discontinued **AND** sodium chloride 0.9%, 10 mL, Intravenous, Q6H **AND** sodium  chloride 0.9%, 10 mL, Intravenous, PRN    traZODone, 200 mg, Oral, Nightly PRN    Review of patient's allergies indicates:   Allergen Reactions    Baclofen Itching and Anxiety        Past Medical History:   Diagnosis Date    Arthritis     Chronic ulcer of ankle 05/26/2022    ESBL (extended spectrum beta-lactamase) producing bacteria infection 08/30/2024    Frequent UTI 07/02/2019    Generalized anxiety disorder 05/26/2022    Neurogenic bladder 05/26/2022    Osteomyelitis 05/26/2022    Paraplegia     Presence of suprapubic catheter 05/26/2022    Pure hypercholesterolemia 05/26/2022    Retention of urine, unspecified 08/09/2019    Spinal cord injury at T1-T6 level 04/20/2018     Past Surgical History:   Procedure Laterality Date    ANGIOGRAM, RENAL ARTERIES, BILATERAL N/A 10/17/2024    Procedure: Angiogram, Renal Arteries, Bilateral;  Surgeon: Pati King MD;  Location: [facility] CATH LAB;  Service: Peripheral Vascular;  Laterality: N/A;  left renal artery coiling    APPLICATION OF WOUND VACUUM-ASSISTED CLOSURE DEVICE N/A 10/10/2024    Procedure: APPLICATION, WOUND VAC;  Surgeon: Rob Sinclair MD;  Location: [facility];  Service: General;  Laterality: N/A;    COLONOSCOPY N/A 11/13/2024    Procedure: COLON;  Surgeon: Thanh Olson MD;  Location: [facility] ENDOSCOPY;  Service: Gastroenterology;  Laterality: N/A;  though ostomy    CREATION, COLOSTOMY, LAPAROSCOPIC N/A 11/6/2024    Procedure: CREATION, COLOSTOMY, LAPAROSCOPIC CONVERTED  TO OPEN;  Surgeon: Rob Sinclair MD;  Location: [facility] OR;  Service: General;  Laterality: N/A;    EGD, WITH CLOSED BIOPSY N/A 8/29/2024    Procedure: EGD, WITH CLOSED BIOPSY;  Surgeon: Mikal Segovia MD;  Location: [facility] ENDOSCOPY;  Service: Gastroenterology;  Laterality: N/A;    ESOPHAGOGASTRODUODENOSCOPY N/A 8/29/2024    Procedure: EGD;  Surgeon: Mikal Segovia MD;  Location: [facility] ENDOSCOPY;  Service: Gastroenterology;  Laterality: N/A;    FRACTURE SURGERY  2021     INCISION AND DRAINAGE, LOWER EXTREMITY Right 2023    Procedure: INCISION AND DRAINAGE, LOWER EXTREMITY;  Surgeon: Prabhu Shen DO;  Location: [facility] OR;  Service: Orthopedics;  Laterality: Right;  supine bone foam wash stuff cultures    INCISION AND DRAINAGE, LOWER EXTREMITY Right 2023    Procedure: INCISION AND DRAINAGE, LOWER EXTREMITY;  Surgeon: Prabhu Shen DO;  Location: [facility] OR;  Service: Orthopedics;  Laterality: Right;  supine any table bone foam wash stuff possible wound vac    INCISION AND DRAINAGE, LOWER EXTREMITY Right 2023    Procedure: INCISION AND DRAINAGE, LOWER EXTREMITY;  Surgeon: Prabhu Shen DO;  Location: [facility] OR;  Service: Orthopedics;  Laterality: Right;  supine bone foam vascular bed removal of distal femoral plate, wash stuff, possible AKA    INSERTION OF INTRAMEDULLARY MARISA Right 08/10/2022    Procedure: INSERTION, INTRAMEDULLARY MARISA RIGHT TIBIA;  Surgeon: Jorge Orellana MD;  Location: [facility] OR;  Service: Orthopedics;  Laterality: Right;    JOINT REPLACEMENT      Ankel    PRESSURE ULCER DEBRIDEMENT N/A 10/10/2024    Procedure: DEBRIDEMENT, PRESSURE ULCER;  Surgeon: Rob Sinclair MD;  Location: [facility] OR;  Service: General;  Laterality: N/A;  sacral wound, R buttock wound    REMOVAL OF HARDWARE FROM LOWER EXTREMITY Right 2023    Procedure: REMOVAL, HARDWARE, LOWER EXTREMITY;  Surgeon: Prabhu Shen DO;  Location: [facility] OR;  Service: Orthopedics;  Laterality: Right;    SPINE SURGERY  2018       Family History       Problem Relation (Age of Onset)    No Known Problems Mother, Father          Tobacco Use    Smoking status: Some Days     Current packs/day: 0.00     Average packs/day: 0.2 packs/day for 41.5 years (10.4 ttl pk-yrs)     Types: Cigars, Cigarettes     Start date: 1982     Last attempt to quit: 2023     Years since quittin.3    Smokeless tobacco: Never   Substance and Sexual Activity    Alcohol use: Not Currently     Alcohol/week:  2.0 standard drinks of alcohol     Types: 2 Cans of beer per week    Drug use: Not Currently     Types: Oxycodone    Sexual activity: Not Currently     Partners: Female     Birth control/protection: None     Review of Systems   Constitutional:  Positive for activity change, appetite change and fatigue. Negative for chills, diaphoresis and fever.   HENT: Negative.     Respiratory: Negative.     Genitourinary:         Suprapubic catheter with leakage    Skin:  Positive for wound.       Objective:     Vital Signs (Most Recent):  Temp: 97.9 °F (36.6 °C) (11/19/24 0802)  Pulse: 84 (11/19/24 1002)  Resp: 20 (11/19/24 1016)  BP: (!) 144/84 (11/19/24 1002)  SpO2: 97 % (11/19/24 0802) Vital Signs (24h Range):  Temp:  [97.5 °F (36.4 °C)-98.2 °F (36.8 °C)] 97.9 °F (36.6 °C)  Pulse:  [75-90] 84  Resp:  [16-20] 20  SpO2:  [97 %-100 %] 97 %  BP: (129-167)/(82-99) 144/84     Weight: 79.3 kg (174 lb 13.2 oz)  Body mass index is 25.08 kg/m².     Physical Exam  Vitals reviewed.   Constitutional:       General: He is not in acute distress.     Appearance: He is ill-appearing (chronic). He is not toxic-appearing.      Comments: Muscle wasting of BLE  Appears much older than stated age    HENT:      Head: Normocephalic and atraumatic.      Nose: Nose normal.      Mouth/Throat:      Pharynx: Oropharynx is clear.   Cardiovascular:      Rate and Rhythm: Normal rate.   Pulmonary:      Effort: Pulmonary effort is normal. No respiratory distress.   Abdominal:      Comments: Suprapubic catheter;  bag  LUQ  colostomy producing brown stool    Musculoskeletal:        Feet:    Feet:      Comments: Right lateral ankle, slough/biofilm covering wound bed, dry wound edges and shallow undermining throughout. No signs of infection    Left heel: pink/red healthy appearing wound bed, no signs of infection  Skin:     General: Skin is warm and dry.      Capillary Refill: Capillary refill takes less than 2 seconds.             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     Stage 4 right buttock wound  with exposed bone and large amount of yellow slough; pink/red dusky appearing tissue. Unhealthy appearing wound that is deep with undermining throughout.     Left lower buttock with shallow red/waxy appearing wound, no drainage or signs of surrounding cellulitis    There is an odor when wound vac removed; improves somewhat after cleansing wound.       Neurological:      Mental Status: He is alert and oriented to person, place, and time.      Motor: Weakness and atrophy present.      Comments: Paraplegia   Dependent for bed mobility and repositioning    Psychiatric:         Behavior: Behavior is cooperative.      Comments: Pleasant and interactive during visit        Sacrum: 5.4 x 6 x 1.8 cm with undermining from 4 - 7 o'clock 2 cm.   Right lower buttock: 4.5 x 5.6 x 2.4 cm with undermining throughout deepest from 10 - 4 o'clock 2.4 cm   Left lower buttock: 4 x 5.3 cm       Right lateral ankle; 1.5 x 1 x 0.1 cm       Left heel: 3.4 x 3 x 0.5 cm with undermining from 4 - 6 o'clock 1 cm                Laboratory:  A1C:   Recent Labs   Lab 10/09/24  0521   HGBA1C 6.8       BMP:   Recent Labs   Lab 11/14/24  0527 11/15/24  0657 11/18/24  0534   *   < > 136   K 4.0   < > 4.7      < > 103   CO2 25   < > 27   BUN 22.3   < > 18.1   CREATININE 1.36*   < > 1.23   CALCIUM 8.4*   < > 8.5*   MG 1.40*  --   --     < > = values in this interval not displayed.       CBC:   Recent Labs   Lab 11/18/24  0534   WBC 10.07   RBC 3.25*   HGB 9.7*   HCT 29.9*   *   MCV 92.0   MCH 29.8   MCHC 32.4*     CMP:   Recent Labs   Lab 11/18/24  0534   CALCIUM 8.5*   ALBUMIN 1.7*      K 4.7   CO2 27      BUN 18.1   CREATININE 1.23    ALKPHOS 81   ALT 5   AST 23   BILITOT 0.6       LFTs:   Recent Labs   Lab 11/18/24  0534   ALT 5   AST 23   ALKPHOS 81   BILITOT 0.6   ALBUMIN 1.7*         Diagnostic Results:  I have reviewed all pertinent imaging results/findings within the past 24 hours.
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
## 2024-11-19 NOTE — PROGRESS NOTES
[facility] - 8th Floor Med Surg  Wound Care    Patient Name:  Bianca Khan   MRN:  [id number]  Date: 2024  Diagnosis: Pressure ulcer of sacral region, stage 4    History:     Past Medical History:   Diagnosis Date    Arthritis     Chronic ulcer of ankle 2022    ESBL (extended spectrum beta-lactamase) producing bacteria infection 2024    Frequent UTI 2019    Generalized anxiety disorder 2022    Neurogenic bladder 2022    Osteomyelitis 2022    Paraplegia     Presence of suprapubic catheter 2022    Pure hypercholesterolemia 2022    Retention of urine, unspecified 2019    Spinal cord injury at T1-T6 level 2018       Social History     Socioeconomic History    Marital status:    Tobacco Use    Smoking status: Some Days     Current packs/day: 0.00     Average packs/day: 0.2 packs/day for 41.5 years (10.4 ttl pk-yrs)     Types: Cigars, Cigarettes     Start date: 1982     Last attempt to quit: 2023     Years since quittin.3    Smokeless tobacco: Never   Substance and Sexual Activity    Alcohol use: Not Currently     Alcohol/week: 2.0 standard drinks of alcohol     Types: 2 Cans of beer per week    Drug use: Not Currently     Types: Oxycodone    Sexual activity: Not Currently     Partners: Female     Birth control/protection: None     Social Drivers of Health     Financial Resource Strain: Low Risk  (10/10/2024)    Overall Financial Resource Strain (CARDIA)     Difficulty of Paying Living Expenses: Not hard at all   Food Insecurity: No Food Insecurity (10/10/2024)    Hunger Vital Sign     Worried About Running Out of Food in the Last Year: Never true     Ran Out of Food in the Last Year: Never true   Transportation Needs: No Transportation Needs (10/10/2024)    TRANSPORTATION NEEDS     Transportation : No   Physical Activity: Inactive (2023)    Exercise Vital Sign     Days of Exercise per Week: 0 days     Minutes of  Exercise per Session: 0 min   Stress: No Stress Concern Present (10/10/2024)    [employer] - Occupational Stress Questionnaire     Feeling of Stress : Only a little   Housing Stability: Low Risk  (10/10/2024)    Housing Stability Vital Sign     Unable to Pay for Housing in the Last Year: No     Homeless in the Last Year: No       Precautions:     Allergies as of 10/08/2024 - Reviewed 10/08/2024   Allergen Reaction Noted    Baclofen Itching and Anxiety 06/17/2019       WO Assessment Details/Treatment        11/19/24 0929   WOCN Assessment   Visit Date 11/19/24   Visit Time 0929   Consult Type Follow Up   WOCN Speciality Ostomy   WOCN List colostomy   Wound surgical   Continence Type Fecal   Ostomy Type Colostomy   Procedure ostomy pouch   Intervention chart review;assessed;changed;applied;orders   Teaching on-going        Altered Skin Integrity 06/28/23 2230 Right lateral Ankle #6   Date First Assessed/Time First Assessed: 06/28/23 2230   Altered Skin Integrity Present on Admission - Did Patient arrive to the hospital with altered skin?: yes  Side: Right  Orientation: lateral  Location: Ankle  Wound Number: #6  Is this injury dev...   Wound Image    Description of Altered Skin Integrity Full thickness tissue loss. Subcutaneous fat may be visible but bone, tendon or muscle are not exposed   Dressing Appearance Dry;Intact;Clean   Drainage Amount Small   Drainage Characteristics/Odor Creamy   Appearance Pink;Red;Yellow   Tissue loss description Full thickness   Black (%), Wound Tissue Color 0 %   Red (%), Wound Tissue Color 60 %   Yellow (%), Wound Tissue Color 40 %   Periwound Area Intact;Scar tissue;Pale white   Wound Edges Callused   Wound Length (cm) 1.5 cm   Wound Width (cm) 1 cm   Wound Depth (cm) 0.1 cm   Wound Volume (cm^3) 0.15 cm^3   Wound Surface Area (cm^2) 1.5 cm^2   Care Cleansed with:;Wound cleanser  (vashe)   Dressing Applied;Calcium alginate;Silver;Foam   Off Loading Off  loading shoe        Altered Skin Integrity 01/09/24 0848 upper Sacral spine   Date First Assessed/Time First Assessed: 01/09/24 0848   Altered Skin Integrity Present on Admission - Did Patient arrive to the hospital with altered skin?: suspected hospital acquired  Orientation: upper  Location: Sacral spine   Wound Image    Description of Altered Skin Integrity Full thickness tissue loss with exposed bone, tendon, or muscle. Often includes undermining and tunneling. May extend into muscle and/or supporting structures.   Dressing Appearance Intact   Drainage Amount Small   Drainage Characteristics/Odor Serosanguineous   Appearance Pink;Red;Maroon;Ecchymotic   Tissue loss description Full thickness   Black (%), Wound Tissue Color 10 %   Red (%), Wound Tissue Color 80 %   Yellow (%), Wound Tissue Color 10 %   Periwound Area Intact;Dry;Pale white;Scar tissue;Pink   Wound Edges Defined   Wound Length (cm) 5.4 cm   Wound Width (cm) 6 cm   Wound Depth (cm) 1.8 cm   Wound Volume (cm^3) 58.32 cm^3   Wound Surface Area (cm^2) 32.4 cm^2   Undermining (depth (cm)/location) 4-7 oclock @2cm   Care Cleansed with:;Wound cleanser  (vashe)   Dressing Removed;Other (comment);Applied;Gauze, wet to dry  (NPWT; appllied gauze wet to dry (vashe))        Wound 05/30/24 0000 Pressure Injury Right Buttocks   Date First Assessed/Time First Assessed: 05/30/24 0000   Primary Wound Type: Pressure Injury  Side: Right  Location: Buttocks  Is this injury device related?: No   Wound Image   (camera malfuntion; see media)   Pressure Injury Stage 4   Dressing Appearance Intact;Dried drainage   Drainage Amount Scant   Drainage Characteristics/Odor Serosanguineous   Appearance Pink;Red;Yellow;Muscle;Bone;Adipose   Tissue loss description Full thickness   Black (%), Wound Tissue Color 0 %   Red (%), Wound Tissue Color 60 %   Yellow (%), Wound Tissue Color 40 %   Periwound Area Macerated;Pale white;Scar tissue;Pink   Wound Edges Defined   Wound Length (cm)  4.5 cm   Wound Width (cm) 5.6 cm   Wound Depth (cm) 2.4 cm   Wound Volume (cm^3) 60.48 cm^3   Wound Surface Area (cm^2) 25.2 cm^2   Care Cleansed with:;Wound cleanser  (vashe)   Dressing Removed;Other (comment)  (NPWT; applied vashe wet to dry dressing, abd pad, tape)   Off Loading Other (see comments)  (sand bed is being ordered for patient)        Wound 08/22/24 0800 Other (comment) Left Heel   Date First Assessed/Time First Assessed: 08/22/24 0800   Primary Wound Type: Other (comment)  Side: Left  Location: Heel   Wound Image    Dressing Appearance Intact;Moist drainage   Drainage Amount Small   Drainage Characteristics/Odor Creamy   Appearance Pink;Red;Yellow   Tissue loss description Full thickness   Black (%), Wound Tissue Color 0 %   Red (%), Wound Tissue Color 90 %   Yellow (%), Wound Tissue Color 10 %   Periwound Area Dry;Pale white;Scar tissue   Wound Edges Callused   Wound Length (cm) 3.4 cm   Wound Width (cm) 3 cm   Wound Depth (cm) 0.5 cm   Wound Volume (cm^3) 5.1 cm^3   Wound Surface Area (cm^2) 10.2 cm^2   Care Cleansed with:;Wound cleanser  (vashe)   Dressing Changed;Calcium alginate;Silver;Absorptive Pad;Rolled gauze   Off Loading Off loading shoe        Wound 11/15/24 0800 Pressure Injury Left posterior Greater trochanter   Date First Assessed/Time First Assessed: 11/15/24 0800   Present on Original Admission: No  Primary Wound Type: Pressure Injury  Side: Left  Orientation: posterior  Location: Greater trochanter   Wound Image   (camera malfunction; see media)   Pressure Injury Stage 2  (Wound care MD/NP saw all wounds today)   Dressing Appearance Open to air   Drainage Amount None   Drainage Characteristics/Odor No odor   Appearance Pink;Red;Yellow   Tissue loss description Full thickness   Black (%), Wound Tissue Color 0 %   Red (%), Wound Tissue Color 90 %   Yellow (%), Wound Tissue Color 10 %   Periwound Area Intact;Pink;Scar tissue   Wound Edges Defined   Wound Length (cm) 4 cm   Wound Width  (cm) 5.3 cm   Wound Depth (cm) 0.1 cm   Wound Volume (cm^3) 2.12 cm^3   Wound Surface Area (cm^2) 21.2 cm^2   Care Cleansed with:;Wound cleanser  (vashe)   Dressing Applied;Other (comment)  (zinc oxide)   Off Loading Other (see comments)  (wound care NP ordered sand bed to help with better offloading pt. sacrum)   [REMOVED]      Negative Pressure Wound Therapy  10/10/24 1017   Removal Date/Time: 11/19/24 1000  Placement Date/Time: 10/10/24 1017   Location: Buttocks  Additional Comments: SN: [id number]  Removal Indication and Assessment: (c)    NPWT Type Vacuum Therapy   Therapy Setting NPWT Vacuum off   Pressure Setting NPWT   (discontinued for now; will follow up to see if will continue vac in a week or two)        Colostomy 11/06/24 LUQ   Placement Date: 11/06/24   Inserted by: MD  Location: LUQ   Wound Image   (camera malfunciton)   Stomal Appliance 2 piece;Clean;Dry;Intact;Other (Comment)  (CNA changed pouch)   Stoma Appearance round;rosebud appearance;moist;red   Site Assessment Clean;Intact   Peristomal Assessment ALDO   Accessories/Skin Care wafer barrier over peristomal skin   Stoma Function stool;bowel sweat   Tolerance no signs/symptoms of discomfort     WOCN follow up for colostomy education and care. Discussed POC w/ nurse Yelitza who came to bedside to assess wounds. No family at bedside. Explained reason for visit. New colostomy as of 11/6/2024.  Pt.'s stoma within normal limits and producing brown stool. Ostomy supplies at bedside.   WOCN follow up for removal of wound vac to right buttock and sacral wound per wound care MD and NP. Follow up for Left heel and right lateral ankle as well. Had assistance from Jaime and Tevin from wound care team. Wound vac application removed from right buttock and sacrum wound- 1 black foam, 1 white foam removed. Cleaned wound w/ vashe. New treatment orders for sacrum/Right buttock: clean w/ vashe, dry well, apply vashe moistened gauze to wound beds, dry gauze, abd pad,  secure w/ tape. BID/PRN if soilage.  Cont. Tx recs in orders for left heel and right lateral ankle. Treatment recommendations for left heel: Clean w/ vashe, dry well, apply Ca+ Ag to red/good tissue wound bed, abd pad, kerlix, secure w/ tape. BID/Prn if soilage. Right lateral ankle: clean w/ vashe, dry well, apply Ca+ Ag cloth to wound bed, gauze or foam. BID/PRN if soilage. Dr Tafoya is aware of wounds and consulted wound care MD/NP to join wound care treatment. Treatment recs: Left post upper thigh: Clean w/ vashe, dry well, apply zinc oxide (orange top cream) to area along with the scrotum. BID/PRN if soilage.  Nursing to cont. Tx recs and preventative measures. Will follow up Friday.       11/19/2024

## 2024-11-19 NOTE — PLAN OF CARE
[facility] received communication from Vanessa with [facility]. They've received patient's referral and will continue to monitor to see if patient is able to participate in more therapy.

## 2024-11-20 LAB — PREALB SERPL-MCNC: 10.1 MG/DL (ref 16–42)

## 2024-11-20 PROCEDURE — 99900035 HC TECH TIME PER 15 MIN (STAT)

## 2024-11-20 PROCEDURE — 63600175 PHARM REV CODE 636 W HCPCS: Performed by: STUDENT IN AN ORGANIZED HEALTH CARE EDUCATION/TRAINING PROGRAM

## 2024-11-20 PROCEDURE — 27000207 HC ISOLATION

## 2024-11-20 PROCEDURE — 94760 N-INVAS EAR/PLS OXIMETRY 1: CPT

## 2024-11-20 PROCEDURE — 99233 SBSQ HOSP IP/OBS HIGH 50: CPT | Mod: ,,, | Performed by: INTERNAL MEDICINE

## 2024-11-20 PROCEDURE — 25000003 PHARM REV CODE 250: Performed by: NURSE PRACTITIONER

## 2024-11-20 PROCEDURE — 25000003 PHARM REV CODE 250: Performed by: INTERNAL MEDICINE

## 2024-11-20 PROCEDURE — 99497 ADVNCD CARE PLAN 30 MIN: CPT | Mod: ,,, | Performed by: INTERNAL MEDICINE

## 2024-11-20 PROCEDURE — 25000003 PHARM REV CODE 250: Performed by: HOSPITALIST

## 2024-11-20 PROCEDURE — 21400001 HC TELEMETRY ROOM

## 2024-11-20 PROCEDURE — 11000001 HC ACUTE MED/SURG PRIVATE ROOM

## 2024-11-20 PROCEDURE — 25000003 PHARM REV CODE 250: Performed by: STUDENT IN AN ORGANIZED HEALTH CARE EDUCATION/TRAINING PROGRAM

## 2024-11-20 PROCEDURE — 25000003 PHARM REV CODE 250

## 2024-11-20 PROCEDURE — 27000221 HC OXYGEN, UP TO 24 HOURS

## 2024-11-20 PROCEDURE — A4216 STERILE WATER/SALINE, 10 ML: HCPCS | Performed by: STUDENT IN AN ORGANIZED HEALTH CARE EDUCATION/TRAINING PROGRAM

## 2024-11-20 PROCEDURE — 36415 COLL VENOUS BLD VENIPUNCTURE: CPT | Performed by: EMERGENCY MEDICINE

## 2024-11-20 PROCEDURE — 63600175 PHARM REV CODE 636 W HCPCS: Performed by: INTERNAL MEDICINE

## 2024-11-20 PROCEDURE — 84134 ASSAY OF PREALBUMIN: CPT | Performed by: EMERGENCY MEDICINE

## 2024-11-20 PROCEDURE — S0179 MEGESTROL 20 MG: HCPCS | Performed by: HOSPITALIST

## 2024-11-20 RX ORDER — MEGESTROL ACETATE 40 MG/ML
200 SUSPENSION ORAL 2 TIMES DAILY
Status: DISCONTINUED | OUTPATIENT
Start: 2024-11-20 | End: 2024-11-26 | Stop reason: HOSPADM

## 2024-11-20 RX ADMIN — HYOSCYAMINE SULFATE 0.12 MG: 0.12 TABLET SUBLINGUAL at 05:11

## 2024-11-20 RX ADMIN — OXYBUTYNIN CHLORIDE 5 MG: 5 TABLET ORAL at 09:11

## 2024-11-20 RX ADMIN — HYDRALAZINE HYDROCHLORIDE 20 MG: 20 INJECTION INTRAMUSCULAR; INTRAVENOUS at 12:11

## 2024-11-20 RX ADMIN — DOXYCYCLINE HYCLATE 100 MG: 100 TABLET, COATED ORAL at 09:11

## 2024-11-20 RX ADMIN — FENOFIBRATE 48 MG: 48 TABLET, FILM COATED ORAL at 09:11

## 2024-11-20 RX ADMIN — Medication 400 ML: at 04:11

## 2024-11-20 RX ADMIN — Medication 400 ML: at 06:11

## 2024-11-20 RX ADMIN — TRAZODONE HYDROCHLORIDE 200 MG: 100 TABLET ORAL at 10:11

## 2024-11-20 RX ADMIN — NIFEDIPINE 90 MG: 90 TABLET, FILM COATED, EXTENDED RELEASE ORAL at 09:11

## 2024-11-20 RX ADMIN — OXYCODONE AND ACETAMINOPHEN 1 TABLET: 10; 325 TABLET ORAL at 05:11

## 2024-11-20 RX ADMIN — SODIUM CHLORIDE, PRESERVATIVE FREE 10 ML: 5 INJECTION INTRAVENOUS at 06:11

## 2024-11-20 RX ADMIN — MORPHINE SULFATE 2 MG: 4 INJECTION, SOLUTION INTRAMUSCULAR; INTRAVENOUS at 09:11

## 2024-11-20 RX ADMIN — OXYCODONE AND ACETAMINOPHEN 1 TABLET: 10; 325 TABLET ORAL at 02:11

## 2024-11-20 RX ADMIN — HYOSCYAMINE SULFATE 0.12 MG: 0.12 TABLET SUBLINGUAL at 09:11

## 2024-11-20 RX ADMIN — MORPHINE SULFATE 2 MG: 4 INJECTION, SOLUTION INTRAMUSCULAR; INTRAVENOUS at 03:11

## 2024-11-20 RX ADMIN — GABAPENTIN 400 MG: 400 CAPSULE ORAL at 01:11

## 2024-11-20 RX ADMIN — MEGESTROL ACETATE 200 MG: 400 SUSPENSION ORAL at 09:11

## 2024-11-20 RX ADMIN — OXYCODONE AND ACETAMINOPHEN 1 TABLET: 10; 325 TABLET ORAL at 06:11

## 2024-11-20 RX ADMIN — FLUOXETINE 20 MG: 20 CAPSULE ORAL at 09:11

## 2024-11-20 RX ADMIN — CARVEDILOL 50 MG: 12.5 TABLET, FILM COATED ORAL at 09:11

## 2024-11-20 RX ADMIN — ONDANSETRON 4 MG: 2 INJECTION INTRAMUSCULAR; INTRAVENOUS at 01:11

## 2024-11-20 RX ADMIN — HYOSCYAMINE SULFATE 0.12 MG: 0.12 TABLET SUBLINGUAL at 06:11

## 2024-11-20 RX ADMIN — HYOSCYAMINE SULFATE 0.12 MG: 0.12 TABLET SUBLINGUAL at 01:11

## 2024-11-20 RX ADMIN — SENNOSIDES AND DOCUSATE SODIUM 2 TABLET: 50; 8.6 TABLET ORAL at 09:11

## 2024-11-20 RX ADMIN — GABAPENTIN 400 MG: 400 CAPSULE ORAL at 05:11

## 2024-11-20 RX ADMIN — PANTOPRAZOLE SODIUM 40 MG: 40 TABLET, DELAYED RELEASE ORAL at 06:11

## 2024-11-20 RX ADMIN — OXYCODONE AND ACETAMINOPHEN 1 TABLET: 10; 325 TABLET ORAL at 09:11

## 2024-11-20 RX ADMIN — SODIUM CHLORIDE, PRESERVATIVE FREE 10 ML: 5 INJECTION INTRAVENOUS at 12:11

## 2024-11-20 RX ADMIN — GABAPENTIN 400 MG: 400 CAPSULE ORAL at 09:11

## 2024-11-20 RX ADMIN — ATORVASTATIN CALCIUM 20 MG: 10 TABLET, FILM COATED ORAL at 09:11

## 2024-11-20 RX ADMIN — METHOCARBAMOL 750 MG: 750 TABLET ORAL at 12:11

## 2024-11-20 RX ADMIN — POLYETHYLENE GLYCOL 3350 17 G: 17 POWDER, FOR SOLUTION ORAL at 09:11

## 2024-11-20 RX ADMIN — SODIUM CHLORIDE, PRESERVATIVE FREE 10 ML: 5 INJECTION INTRAVENOUS at 01:11

## 2024-11-20 RX ADMIN — OXYBUTYNIN CHLORIDE 5 MG: 5 TABLET ORAL at 02:11

## 2024-11-20 RX ADMIN — PANTOPRAZOLE SODIUM 40 MG: 40 TABLET, DELAYED RELEASE ORAL at 04:11

## 2024-11-20 RX ADMIN — HYDROCHLOROTHIAZIDE 25 MG: 25 TABLET ORAL at 09:11

## 2024-11-20 NOTE — PLAN OF CARE
Problem: Adult Inpatient Plan of Care  Goal: Plan of Care Review  Outcome: Progressing  Flowsheets (Taken 11/19/2024 2317)  Plan of Care Reviewed With: patient  Goal: Patient-Specific Goal (Individualized)  Outcome: Progressing  Flowsheets (Taken 11/19/2024 2317)  Individualized Care Needs: Pain Management, PO ABX, Wound care  Anxieties, Fears or Concerns: Denies  Patient/Family-Specific Goals (Include Timeframe): N/A  Goal: Absence of Hospital-Acquired Illness or Injury  Outcome: Progressing  Goal: Optimal Comfort and Wellbeing  Outcome: Progressing  Goal: Readiness for Transition of Care  Outcome: Progressing     Problem: Diabetes Comorbidity  Goal: Blood Glucose Level Within Targeted Range  Outcome: Progressing     Problem: Infection  Goal: Absence of Infection Signs and Symptoms  Outcome: Progressing     Problem: Wound  Goal: Optimal Coping  Outcome: Progressing  Goal: Optimal Functional Ability  Outcome: Progressing  Goal: Absence of Infection Signs and Symptoms  Outcome: Progressing  Goal: Improved Oral Intake  Outcome: Progressing  Goal: Optimal Pain Control and Function  Outcome: Progressing  Goal: Skin Health and Integrity  Outcome: Progressing  Goal: Optimal Wound Healing  Outcome: Progressing     Problem: Skin Injury Risk Increased  Goal: Skin Health and Integrity  Outcome: Progressing     Problem: Pain Acute  Goal: Optimal Pain Control and Function  Outcome: Progressing     Problem: Fall Injury Risk  Goal: Absence of Fall and Fall-Related Injury  Outcome: Progressing     Problem: Coping Ineffective  Goal: Effective Coping  Outcome: Progressing

## 2024-11-20 NOTE — PT/OT/SLP PROGRESS
Attempted to see pt for PT tx. Pt politely declined 2/2 just having been transferred to a new bed. Requesting therapy come back tomorrow. Pt wanting to T/F to chair and go outside tomorrow for PT tx. Spoke with HOLDEN who states she will try and get hospital privileges for pt. PT to f/u as schedule allows.

## 2024-11-20 NOTE — PLAN OF CARE
I went to talk dc planning to pt. He states he would like to see if his wounds on buttocks improve using the new bed he is on starting yesterday (Airfluidized bed) recommended by Wound Care over next 8 days. He said Dr Oviedo will be reviewing to see if he would be a candidate for hyperbaric procedure if his wounds show improvement. If his skin does not improve he wants to go home with hospice. He states he does not want to go to another SNF because he has no chance of the wound to get better.  I secured chat Dr Tafoya and team of above to help with dc planning rec.

## 2024-11-20 NOTE — PROGRESS NOTES
Patient Name: Bianca Khan   MRN: [id number]   Admission Date: 10/8/2024   Hospital Length of Stay: 43   Attending Provider: Rafael Tafoya MD   Consulting Provider: Osvaldo Waller MD    Primary Care Physician:  Areli Vargas PA-C     Principal Problem: Pressure ulcer of sacral region, stage 4       Final diagnoses:  [E44.0] Moderate malnutrition  [S31.000A] Sacral wound  [R58] Retroperitoneal hemorrhage (Primary)  [A49.9, Z16.12] ESBL (extended spectrum beta-lactamase) producing bacteria infection      Goals of care review:    I engage patient in voluntary conversation about advanced care planning which specifically address what goals of care would be moving forward.  After discussion held with patient and wife yesterday, patient stated that he had been placed on a new bed to help reduce the burden on his sacral wounds along with possibly being offered hyperbaric oxygen treatments.  Patient had discussion with family members after seeing palliative care team yesterday and decided that he would like to continue further treatments at this time and to hold off on hospice placement for the time being.  Patient states I would like to try treatment for 7 days and that if he did not improve, he would contact us back.  Patient asked us to call his sister Fidelina Khan ([phone]).  Patient's sister states that she was confused after being told that her brother was being transitioned to hospice care and had convinced him not to go down that route as she herself was not ready to accept this outcome.  Explained to sister about patient's medical condition and discussion held yesterday to which she was more open and understanding at this time.  She reports she will talk to patient later today.    Symptom review:    This morning, patient reports that he continues to have pain primarily in his back and sacral region.  Patient is currently on morphine 2 mg q.12 hours p.r.n., oxycodone 10 mg q.4 hours p.r.n., and  methocarbamol 750 mg t.i.d. p.r.n..  Patient reports relief of pain while on medications but was interested in long-acting medications.  Currently awaiting patient's for decision on hospice care versus continued medical care before coming up with pain regimen due to concerns of patient being able to receive medications depending on which route he chooses.    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  Patient did have some confusion about discussion yesterday where he thought he would no longer be on antibiotics but misunderstanding was corrected as patient would still be able to be on suppressive therapy orally.  Patient reports that he was told that he may be able to be treated with hyperbaric oxygen along with a new orthopedic bed in order to reduce pressure of his wounds with possibility of wound improvement.  Patient reports that he would like to trial treatment for at least 7 days and that if he did not improve, he would consider hospice at that time.  Patient did ask us to call his sister Fidelina Khan ([phone]).  Called patient's sister who states that she was initially confused about patient being hospice candidate.  Explained to sister about patient's current medical condition along with extensive discussions held yesterday with both patient and wife.  After discussion, sister states that she was just overwhelmed with the decision and that she herself was not ready  for that decision to be made.  She reports understanding now that she understands what had occurred yesterday and reports that she would like to talk with her brother later on today.      Active Ambulatory Problems     Diagnosis Date Noted    Neurogenic bladder 05/26/2022    Anemia 05/26/2022    Chronic pain 08/09/2019    Chronic ulcer of ankle 05/26/2022    Dysphagia 04/20/2018    Fracture of distal end of tibia 05/26/2022    Frequent UTI 07/02/2019    Gastroesophageal reflux disease without esophagitis 05/26/2022    Generalized anxiety disorder 05/26/2022    Hypertension 08/09/2019    Hip osteomyelitis, right 05/26/2022    Presence of suprapubic catheter 05/26/2022    Pure hypercholesterolemia 05/26/2022    Spinal cord injury at T1-T6 level 04/20/2018    Type 2 diabetes mellitus 08/09/2019    Closed displaced spiral fracture of shaft of right tibia 08/10/2022    Abscess of bursa of right knee 06/28/2023    Septic arthritis of knee, right 11/30/2023    Pain and swelling of knee, right 11/30/2023    Pressure ulcer of sacral region, stage 4 05/30/2024    Moderate malnutrition 06/04/2024    ESBL (extended spectrum beta-lactamase) producing bacteria infection 08/30/2024     Resolved Ambulatory Problems     Diagnosis Date Noted    Atelectasis 04/23/2018    High C-reactive protein 05/26/2022    Pleural effusion 04/23/2018    Retention of urine, unspecified 08/09/2019    Acute respiratory failure with hypoxemia 08/09/2023    Sepsis 05/30/2024     Past Medical History:   Diagnosis Date    Arthritis     Osteomyelitis 05/26/2022    Paraplegia         Past Surgical History:   Procedure Laterality Date    ANGIOGRAM, RENAL ARTERIES, BILATERAL N/A 10/17/2024    Procedure: Angiogram, Renal Arteries, Bilateral;  Surgeon: Pati King MD;  Location: [facility] CATH LAB;  Service: Peripheral Vascular;  Laterality: N/A;  left renal artery coiling    APPLICATION OF WOUND VACUUM-ASSISTED CLOSURE DEVICE N/A 10/10/2024    Procedure:  APPLICATION, WOUND VAC;  Surgeon: Rob Sinclair MD;  Location: [facility] OR;  Service: General;  Laterality: N/A;    COLONOSCOPY N/A 11/13/2024    Procedure: COLON;  Surgeon: Thanh Olson MD;  Location: [facility] ENDOSCOPY;  Service: Gastroenterology;  Laterality: N/A;  though ostomy    CREATION, COLOSTOMY, LAPAROSCOPIC N/A 11/6/2024    Procedure: CREATION, COLOSTOMY, LAPAROSCOPIC CONVERTED  TO OPEN;  Surgeon: Rob Sinclair MD;  Location: [facility] OR;  Service: General;  Laterality: N/A;    EGD, WITH CLOSED BIOPSY N/A 8/29/2024    Procedure: EGD, WITH CLOSED BIOPSY;  Surgeon: Mikal Segovia MD;  Location: [facility] ENDOSCOPY;  Service: Gastroenterology;  Laterality: N/A;    ESOPHAGOGASTRODUODENOSCOPY N/A 8/29/2024    Procedure: EGD;  Surgeon: Mikal Segovia MD;  Location: [facility] ENDOSCOPY;  Service: Gastroenterology;  Laterality: N/A;    FRACTURE SURGERY  2021    INCISION AND DRAINAGE, LOWER EXTREMITY Right 06/29/2023    Procedure: INCISION AND DRAINAGE, LOWER EXTREMITY;  Surgeon: Prabhu Shen DO;  Location: [facility] OR;  Service: Orthopedics;  Laterality: Right;  supine bone foam wash stuff cultures    INCISION AND DRAINAGE, LOWER EXTREMITY Right 11/30/2023    Procedure: INCISION AND DRAINAGE, LOWER EXTREMITY;  Surgeon: Prabhu Shen DO;  Location: [facility] OR;  Service: Orthopedics;  Laterality: Right;  supine any table bone foam wash stuff possible wound vac    INCISION AND DRAINAGE, LOWER EXTREMITY Right 12/1/2023    Procedure: INCISION AND DRAINAGE, LOWER EXTREMITY;  Surgeon: Prabhu Shen DO;  Location: [facility] OR;  Service: Orthopedics;  Laterality: Right;  supine bone foam vascular bed removal of distal femoral plate, wash stuff, possible AKA    INSERTION OF INTRAMEDULLARY MARISA Right 08/10/2022    Procedure: INSERTION, INTRAMEDULLARY MARISA RIGHT TIBIA;  Surgeon: Jorge Orellana MD;  Location: [facility];  Service: Orthopedics;  Laterality: Right;    JOINT REPLACEMENT  2021    Ankel    PRESSURE ULCER DEBRIDEMENT N/A  10/10/2024    Procedure: DEBRIDEMENT, PRESSURE ULCER;  Surgeon: Rob Sinclair MD;  Location: [facility] OR;  Service: General;  Laterality: N/A;  sacral wound, R buttock wound    REMOVAL OF HARDWARE FROM LOWER EXTREMITY Right 12/1/2023    Procedure: REMOVAL, HARDWARE, LOWER EXTREMITY;  Surgeon: Prabhu Shen DO;  Location: [facility] OR;  Service: Orthopedics;  Laterality: Right;    SPINE SURGERY  March 1st 2018        Review of patient's allergies indicates:   Allergen Reactions    Baclofen Itching and Anxiety          Current Facility-Administered Medications:     0.9%  NaCl infusion (for blood administration), , Intravenous, Q24H PRN, Yissel Proctor MD    0.9%  NaCl infusion (for blood administration), , Intravenous, Q24H PRN, Willy Smart MD    0.9%  NaCl infusion (for blood administration), , Intravenous, Q24H PRN, Lucho Rodríguez MD    0.9%  NaCl infusion (for blood administration), , Intravenous, Q24H PRN, Yissel Proctor MD    acetaminophen tablet 650 mg, 650 mg, Oral, Q4H PRN, Saranya Jiménez MD, 650 mg at 11/06/24 2306    albuterol-ipratropium 2.5 mg-0.5 mg/3 mL nebulizer solution 3 mL, 3 mL, Nebulization, Q4H PRN, Tushar Kirk MD, 3 mL at 10/28/24 2043    aluminum-magnesium hydroxide-simethicone 200-200-20 mg/5 mL suspension 30 mL, 30 mL, Oral, QID PRN, Saranya Jiménez MD, 30 mL at 11/14/24 0843    atorvastatin tablet 20 mg, 20 mg, Oral, Nightly, Julia Pruitt MD, 20 mg at 11/19/24 2147    carvediloL tablet 50 mg, 50 mg, Oral, BID, Rafael Tafoya MD, 50 mg at 11/20/24 0936    dextrose 10% bolus 125 mL 125 mL, 12.5 g, Intravenous, PRN, Saranya Jiménez MD    dextrose 10% bolus 250 mL 250 mL, 25 g, Intravenous, PRN, Saranya Jiménez MD    diphenhydrAMINE injection 50 mg, 50 mg, Intravenous, Q6H PRN, Haven Pringle DO, 50 mg at 10/12/24 2029    doxycycline tablet 100 mg, 100 mg, Oral, Q12H, Magdi Long FNP, 100 mg at 11/20/24 0936    electrolytes-dextrose (Pedialyte) oral solution  400 mL, 400 mL, Oral, BID AC, Michele Pagan MD, 400 mL at 11/20/24 0635    etomidate injection, , Intravenous, Code/trauma/sedation Med,  EmeterioJosefina crawford, , 20 mg at 10/17/24 0530    fenofibrate tablet 48 mg, 48 mg, Oral, Daily, Julia Pruitt MD, 48 mg at 11/20/24 0936    FLUoxetine capsule 20 mg, 20 mg, Oral, Daily, Julia Pruitt MD, 20 mg at 11/20/24 0936    gabapentin capsule 400 mg, 400 mg, Oral, Q8H, Julia Pruitt MD, 400 mg at 11/20/24 0527    glucagon (human recombinant) injection 1 mg, 1 mg, Intramuscular, PRN, Kulwinder Mansfield MD    guaiFENesin 100 mg/5 ml syrup 200 mg, 200 mg, Oral, Q4H PRN, Tushar Kirk MD    hydrALAZINE injection 20 mg, 20 mg, Intravenous, Q4H PRN, Debbie Sena MD, 20 mg at 11/20/24 1224    hydroCHLOROthiazide tablet 25 mg, 25 mg, Oral, Daily, Rafael Tafoya MD, 25 mg at 11/20/24 0936    hydrOXYzine HCL tablet 25 mg, 25 mg, Oral, BID PRN, Julia Pruitt MD, 25 mg at 11/14/24 0028    hyoscyamine SL tablet 0.125 mg, 0.125 mg, Sublingual, Q4H, Kristin Quinn AGACNP-BC, 0.125 mg at 11/20/24 0936    methocarbamoL tablet 750 mg, 750 mg, Oral, TID PRN, Saranya Jiménez MD, 750 mg at 11/20/24 1224    morphine injection 2 mg, 2 mg, Intravenous, BID PRN, Haven Pringle DO, 2 mg at 11/20/24 0338    NIFEdipine 24 hr tablet 90 mg, 90 mg, Oral, Daily, Rafael Tafoya MD, 90 mg at 11/20/24 0936    ondansetron injection 4 mg, 4 mg, Intravenous, Q4H PRN, Saranya Jiménez MD, 4 mg at 11/15/24 0929    oxybutynin tablet 5 mg, 5 mg, Per NG tube, TID, Franco Alfredo MD, 5 mg at 11/20/24 0936    oxyCODONE-acetaminophen  mg per tablet 1 tablet, 1 tablet, Oral, Q4H PRN, Saranya Jiménez MD, 1 tablet at 11/20/24 0937    pantoprazole EC tablet 40 mg, 40 mg, Oral, BID AC, Michele Pagan MD, 40 mg at 11/20/24 0634    polyethylene glycol packet 17 g, 17 g, Oral, BID, Yaya Oconnell MD, 17 g at 11/20/24 0937    prochlorperazine injection Soln 5 mg,  5 mg, Intravenous, Q6H PRN, Saranya Jiménez MD    rocuronium injection, , Intravenous, Code/trauma/sedation Med, Josefina Hodges DO, 73 mg at 10/17/24 0531    senna-docusate 8.6-50 mg per tablet 2 tablet, 2 tablet, Oral, BID, Michele Pagan MD, 2 tablet at 11/20/24 0936    sodium chloride 0.9% flush 10 mL, 10 mL, Intravenous, PRN, Saranya Jiménez MD    Flushing PICC/Midline Protocol, , , Until Discontinued **AND** sodium chloride 0.9% flush 10 mL, 10 mL, Intravenous, Q6H, 10 mL at 11/20/24 1224 **AND** sodium chloride 0.9% flush 10 mL, 10 mL, Intravenous, PRN, Haven Pringle DO    traZODone tablet 200 mg, 200 mg, Oral, Nightly PRN, Saranya Jiménez MD, 200 mg at 11/18/24 2136       Current Facility-Administered Medications:     0.9%  NaCl infusion (for blood administration), , Intravenous, Q24H PRN    0.9%  NaCl infusion (for blood administration), , Intravenous, Q24H PRN    0.9%  NaCl infusion (for blood administration), , Intravenous, Q24H PRN    0.9%  NaCl infusion (for blood administration), , Intravenous, Q24H PRN    acetaminophen, 650 mg, Oral, Q4H PRN    albuterol-ipratropium, 3 mL, Nebulization, Q4H PRN    aluminum-magnesium hydroxide-simethicone, 30 mL, Oral, QID PRN    dextrose 10%, 12.5 g, Intravenous, PRN    dextrose 10%, 25 g, Intravenous, PRN    diphenhydrAMINE, 50 mg, Intravenous, Q6H PRN    etomidate, , Intravenous, Code/trauma/sedation Med    glucagon (human recombinant), 1 mg, Intramuscular, PRN    guaiFENesin 100 mg/5 ml, 200 mg, Oral, Q4H PRN    hydrALAZINE, 20 mg, Intravenous, Q4H PRN    hydrOXYzine HCL, 25 mg, Oral, BID PRN    methocarbamoL, 750 mg, Oral, TID PRN    morphine, 2 mg, Intravenous, BID PRN    ondansetron, 4 mg, Intravenous, Q4H PRN    oxyCODONE-acetaminophen, 1 tablet, Oral, Q4H PRN    prochlorperazine, 5 mg, Intravenous, Q6H PRN    rocuronium, , Intravenous, Code/trauma/sedation Med    sodium chloride 0.9%, 10 mL, Intravenous, PRN    Flushing PICC/Midline Protocol, , , Until  "Discontinued **AND** sodium chloride 0.9%, 10 mL, Intravenous, Q6H **AND** sodium chloride 0.9%, 10 mL, Intravenous, PRN    traZODone, 200 mg, Oral, Nightly PRN     Family History   Problem Relation Name Age of Onset    No Known Problems Mother      No Known Problems Father            Review of Systems   Constitutional:  Positive for appetite change and fatigue. Negative for fever.   Respiratory:  Negative for cough, choking, shortness of breath and wheezing.    Cardiovascular:  Negative for chest pain, palpitations and leg swelling.   Gastrointestinal:  Negative for abdominal distention, abdominal pain, constipation, diarrhea, nausea and vomiting.   Genitourinary:  Negative for dysuria.   Musculoskeletal:  Positive for back pain.   Skin:  Positive for wound.   Neurological:  Positive for weakness. Negative for dizziness, seizures, speech difficulty and headaches.            Objective:   BP (!) 174/98   Pulse 75   Temp 97.9 °F (36.6 °C) (Oral)   Resp 18   Ht 5' 10" (1.778 m)   Wt 79.3 kg (174 lb 13.2 oz)   SpO2 98%   BMI 25.08 kg/m²      Physical Exam   Constitutional: He is oriented to person, place, and time. He appears ill.   HENT:   Head: Normocephalic.   Mouth/Throat: Mucous membranes are moist.   Eyes: Pupils are equal, round, and reactive to light. Conjunctivae are normal.   Neck:   Right tunneled IJ in place   Cardiovascular: Normal rate, regular rhythm and normal pulses. Pulmonary:      Effort: Pulmonary effort is normal. No respiratory distress.      Breath sounds: No wheezing.     Abdominal: Soft. Bowel sounds are normal. He exhibits no distension.   Genitourinary:    Genitourinary Comments: Suprapubic catheter in place     Musculoskeletal:      Cervical back: Normal range of motion.      Right lower leg: No edema.      Left lower leg: No edema.      Comments: Paraplegic   Neurological: He is alert and oriented to person, place, and time.   Skin: Skin is warm.   Bilateral sacral wounds    "       Review of Symptoms  Review of Symptoms      Symptom Assessment (ESAS 0-10 Scale)  Pain:  0  Dyspnea:  0  Anxiety:  0  Nausea:  0  Depression:  0  Anorexia:  0  Fatigue:  0  Insomnia:  0  Restlessness:  0  Agitation:  0       Constipation:  No constipation    Performance Status:  30    Living Arrangements:  Lives with spouse    Psychosocial/Cultural:   See Palliative Psychosocial Note: No  **Primary  to Follow**  Palliative Care  Consult: No      Advance Care Planning   Advance Directives:   Living Will: No    [place]: No    Do Not Resuscitate Status: Yes    Medical Power of : Yes      Decision Making:  Patient answered questions and Family answered questions  Goals of Care: What is most important right now is to focus on spending time at home, avoiding the hospital, symptom/pain control, comfort and QOL . Accordingly, we have decided that the best plan to meet the patient's goals includes continuing with treatment.        Jv Morgan MD  \[facility]\"" Internal Medicine, [facility]
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
## 2024-11-20 NOTE — PLAN OF CARE
[facility] sent clinical updates to [facility] via [employer]. Awaiting more choices from patient/fly.

## 2024-11-20 NOTE — PLAN OF CARE
Problem: Adult Inpatient Plan of Care  Goal: Plan of Care Review  Outcome: Progressing  Goal: Patient-Specific Goal (Individualized)  Outcome: Progressing  Goal: Absence of Hospital-Acquired Illness or Injury  Outcome: Progressing  Goal: Optimal Comfort and Wellbeing  Outcome: Progressing  Goal: Readiness for Transition of Care  Outcome: Progressing     Problem: Diabetes Comorbidity  Goal: Blood Glucose Level Within Targeted Range  Outcome: Progressing     Problem: Infection  Goal: Absence of Infection Signs and Symptoms  Outcome: Progressing     Problem: Wound  Goal: Optimal Coping  Outcome: Progressing  Goal: Optimal Functional Ability  Outcome: Progressing  Goal: Absence of Infection Signs and Symptoms  Outcome: Progressing  Goal: Improved Oral Intake  Outcome: Progressing  Goal: Optimal Pain Control and Function  Outcome: Progressing  Goal: Skin Health and Integrity  Outcome: Progressing  Goal: Optimal Wound Healing  Outcome: Progressing     Problem: Skin Injury Risk Increased  Goal: Skin Health and Integrity  Outcome: Progressing     Problem: Pain Acute  Goal: Optimal Pain Control and Function  Outcome: Progressing     Problem: Fall Injury Risk  Goal: Absence of Fall and Fall-Related Injury  Outcome: Progressing     Problem: Coping Ineffective  Goal: Effective Coping  Outcome: Progressing

## 2024-11-21 LAB
ANION GAP SERPL CALC-SCNC: 7 MEQ/L
BASOPHILS # BLD AUTO: 0.01 X10(3)/MCL
BASOPHILS NFR BLD AUTO: 0.1 %
BUN SERPL-MCNC: 26.8 MG/DL (ref 8.4–25.7)
CALCIUM SERPL-MCNC: 8.8 MG/DL (ref 8.8–10)
CHLORIDE SERPL-SCNC: 100 MMOL/L (ref 98–107)
CO2 SERPL-SCNC: 27 MMOL/L (ref 23–31)
CREAT SERPL-MCNC: 1.21 MG/DL (ref 0.72–1.25)
CREAT/UREA NIT SERPL: 22
EOSINOPHIL # BLD AUTO: 0.02 X10(3)/MCL (ref 0–0.9)
EOSINOPHIL NFR BLD AUTO: 0.2 %
ERYTHROCYTE [DISTWIDTH] IN BLOOD BY AUTOMATED COUNT: 15.6 % (ref 11.5–17)
GFR SERPLBLD CREATININE-BSD FMLA CKD-EPI: >60 ML/MIN/1.73/M2
GLUCOSE SERPL-MCNC: 105 MG/DL (ref 82–115)
HCT VFR BLD AUTO: 28.2 % (ref 42–52)
HGB BLD-MCNC: 9.2 G/DL (ref 14–18)
IMM GRANULOCYTES # BLD AUTO: 0.03 X10(3)/MCL (ref 0–0.04)
IMM GRANULOCYTES NFR BLD AUTO: 0.3 %
LYMPHOCYTES # BLD AUTO: 3.05 X10(3)/MCL (ref 0.6–4.6)
LYMPHOCYTES NFR BLD AUTO: 30.5 %
MCH RBC QN AUTO: 29.2 PG (ref 27–31)
MCHC RBC AUTO-ENTMCNC: 32.6 G/DL (ref 33–36)
MCV RBC AUTO: 89.5 FL (ref 80–94)
MONOCYTES # BLD AUTO: 0.92 X10(3)/MCL (ref 0.1–1.3)
MONOCYTES NFR BLD AUTO: 9.2 %
NEUTROPHILS # BLD AUTO: 5.98 X10(3)/MCL (ref 2.1–9.2)
NEUTROPHILS NFR BLD AUTO: 59.7 %
NRBC BLD AUTO-RTO: 0 %
PLATELET # BLD AUTO: 554 X10(3)/MCL (ref 130–400)
PMV BLD AUTO: 9 FL (ref 7.4–10.4)
POCT GLUCOSE: 107 MG/DL (ref 70–110)
POTASSIUM SERPL-SCNC: 4.4 MMOL/L (ref 3.5–5.1)
RBC # BLD AUTO: 3.15 X10(6)/MCL (ref 4.7–6.1)
SODIUM SERPL-SCNC: 134 MMOL/L (ref 136–145)
WBC # BLD AUTO: 10.01 X10(3)/MCL (ref 4.5–11.5)

## 2024-11-21 PROCEDURE — 11000001 HC ACUTE MED/SURG PRIVATE ROOM

## 2024-11-21 PROCEDURE — 97530 THERAPEUTIC ACTIVITIES: CPT | Mod: CQ

## 2024-11-21 PROCEDURE — 94760 N-INVAS EAR/PLS OXIMETRY 1: CPT

## 2024-11-21 PROCEDURE — 27000207 HC ISOLATION

## 2024-11-21 PROCEDURE — 25000003 PHARM REV CODE 250: Performed by: NURSE PRACTITIONER

## 2024-11-21 PROCEDURE — 25000003 PHARM REV CODE 250: Performed by: INTERNAL MEDICINE

## 2024-11-21 PROCEDURE — 94799 UNLISTED PULMONARY SVC/PX: CPT

## 2024-11-21 PROCEDURE — S0179 MEGESTROL 20 MG: HCPCS | Performed by: HOSPITALIST

## 2024-11-21 PROCEDURE — 27000221 HC OXYGEN, UP TO 24 HOURS

## 2024-11-21 PROCEDURE — 85025 COMPLETE CBC W/AUTO DIFF WBC: CPT | Performed by: HOSPITALIST

## 2024-11-21 PROCEDURE — 25000003 PHARM REV CODE 250

## 2024-11-21 PROCEDURE — 25000003 PHARM REV CODE 250: Performed by: STUDENT IN AN ORGANIZED HEALTH CARE EDUCATION/TRAINING PROGRAM

## 2024-11-21 PROCEDURE — 97110 THERAPEUTIC EXERCISES: CPT | Mod: CO

## 2024-11-21 PROCEDURE — 99900035 HC TECH TIME PER 15 MIN (STAT)

## 2024-11-21 PROCEDURE — 80048 BASIC METABOLIC PNL TOTAL CA: CPT | Performed by: HOSPITALIST

## 2024-11-21 PROCEDURE — 21400001 HC TELEMETRY ROOM

## 2024-11-21 PROCEDURE — 25000003 PHARM REV CODE 250: Performed by: HOSPITALIST

## 2024-11-21 PROCEDURE — 36415 COLL VENOUS BLD VENIPUNCTURE: CPT | Performed by: HOSPITALIST

## 2024-11-21 PROCEDURE — 99900031 HC PATIENT EDUCATION (STAT)

## 2024-11-21 PROCEDURE — A4216 STERILE WATER/SALINE, 10 ML: HCPCS | Performed by: STUDENT IN AN ORGANIZED HEALTH CARE EDUCATION/TRAINING PROGRAM

## 2024-11-21 RX ORDER — OXYBUTYNIN CHLORIDE 5 MG/1
5 TABLET ORAL 3 TIMES DAILY
Status: DISCONTINUED | OUTPATIENT
Start: 2024-11-21 | End: 2024-11-26 | Stop reason: HOSPADM

## 2024-11-21 RX ADMIN — HYOSCYAMINE SULFATE 0.12 MG: 0.12 TABLET SUBLINGUAL at 06:11

## 2024-11-21 RX ADMIN — HYOSCYAMINE SULFATE 0.12 MG: 0.12 TABLET SUBLINGUAL at 10:11

## 2024-11-21 RX ADMIN — HYOSCYAMINE SULFATE 0.12 MG: 0.12 TABLET SUBLINGUAL at 02:11

## 2024-11-21 RX ADMIN — DOXYCYCLINE HYCLATE 100 MG: 100 TABLET, COATED ORAL at 09:11

## 2024-11-21 RX ADMIN — OXYCODONE AND ACETAMINOPHEN 1 TABLET: 10; 325 TABLET ORAL at 11:11

## 2024-11-21 RX ADMIN — OXYCODONE AND ACETAMINOPHEN 1 TABLET: 10; 325 TABLET ORAL at 09:11

## 2024-11-21 RX ADMIN — OXYBUTYNIN CHLORIDE 5 MG: 5 TABLET ORAL at 09:11

## 2024-11-21 RX ADMIN — OXYBUTYNIN CHLORIDE 5 MG: 5 TABLET ORAL at 02:11

## 2024-11-21 RX ADMIN — CARVEDILOL 50 MG: 12.5 TABLET, FILM COATED ORAL at 09:11

## 2024-11-21 RX ADMIN — POLYETHYLENE GLYCOL 3350 17 G: 17 POWDER, FOR SOLUTION ORAL at 09:11

## 2024-11-21 RX ADMIN — OXYCODONE AND ACETAMINOPHEN 1 TABLET: 10; 325 TABLET ORAL at 04:11

## 2024-11-21 RX ADMIN — GABAPENTIN 400 MG: 400 CAPSULE ORAL at 09:11

## 2024-11-21 RX ADMIN — GABAPENTIN 400 MG: 400 CAPSULE ORAL at 02:11

## 2024-11-21 RX ADMIN — SENNOSIDES AND DOCUSATE SODIUM 2 TABLET: 50; 8.6 TABLET ORAL at 09:11

## 2024-11-21 RX ADMIN — FENOFIBRATE 48 MG: 48 TABLET, FILM COATED ORAL at 09:11

## 2024-11-21 RX ADMIN — HYDROCHLOROTHIAZIDE 25 MG: 25 TABLET ORAL at 09:11

## 2024-11-21 RX ADMIN — OXYCODONE AND ACETAMINOPHEN 1 TABLET: 10; 325 TABLET ORAL at 07:11

## 2024-11-21 RX ADMIN — SODIUM CHLORIDE, PRESERVATIVE FREE 10 ML: 5 INJECTION INTRAVENOUS at 11:11

## 2024-11-21 RX ADMIN — PANTOPRAZOLE SODIUM 40 MG: 40 TABLET, DELAYED RELEASE ORAL at 04:11

## 2024-11-21 RX ADMIN — FLUOXETINE 20 MG: 20 CAPSULE ORAL at 09:11

## 2024-11-21 RX ADMIN — HYOSCYAMINE SULFATE 0.12 MG: 0.12 TABLET SUBLINGUAL at 05:11

## 2024-11-21 RX ADMIN — TRAZODONE HYDROCHLORIDE 200 MG: 100 TABLET ORAL at 09:11

## 2024-11-21 RX ADMIN — SODIUM CHLORIDE, PRESERVATIVE FREE 10 ML: 5 INJECTION INTRAVENOUS at 05:11

## 2024-11-21 RX ADMIN — Medication 400 ML: at 05:11

## 2024-11-21 RX ADMIN — ATORVASTATIN CALCIUM 20 MG: 10 TABLET, FILM COATED ORAL at 09:11

## 2024-11-21 RX ADMIN — HYOSCYAMINE SULFATE 0.12 MG: 0.12 TABLET SUBLINGUAL at 09:11

## 2024-11-21 RX ADMIN — NIFEDIPINE 90 MG: 90 TABLET, FILM COATED, EXTENDED RELEASE ORAL at 09:11

## 2024-11-21 RX ADMIN — MEGESTROL ACETATE 200 MG: 400 SUSPENSION ORAL at 09:11

## 2024-11-21 RX ADMIN — SODIUM CHLORIDE, PRESERVATIVE FREE 10 ML: 5 INJECTION INTRAVENOUS at 06:11

## 2024-11-21 RX ADMIN — PANTOPRAZOLE SODIUM 40 MG: 40 TABLET, DELAYED RELEASE ORAL at 05:11

## 2024-11-21 RX ADMIN — Medication 400 ML: at 04:11

## 2024-11-21 RX ADMIN — SODIUM CHLORIDE, PRESERVATIVE FREE 10 ML: 5 INJECTION INTRAVENOUS at 12:11

## 2024-11-21 RX ADMIN — OXYCODONE AND ACETAMINOPHEN 1 TABLET: 10; 325 TABLET ORAL at 03:11

## 2024-11-21 RX ADMIN — GABAPENTIN 400 MG: 400 CAPSULE ORAL at 05:11

## 2024-11-21 NOTE — PT/OT/SLP PROGRESS
Physical Therapy Treatment    Patient Name:  Bianca Khan   MRN:  [id number]    Recommendations:     Discharge therapy intensity: Moderate Intensity Therapy   Discharge Equipment Recommendations: to be determined by next level of care  Barriers to discharge: Decreased caregiver support, Impaired mobility, and Ongoing medical needs    Assessment:     Bianca Khan is a 59 y.o. male admitted with a medical diagnosis of Sacral wound , hx para (2018), IVC filter, GIB, resp failure.  He presents with the following impairments/functional limitations: weakness, impaired endurance, impaired self care skills, impaired functional mobility, gait instability, impaired balance, decreased lower extremity function, impaired sensation, impaired skin .    Pt able to T/F EOB<>w/c maxAx3 (2/2 sand bed and be being unable to get low enough to floor). Therapist able to take pt outside for PT tx.     Rehab Prognosis: Good; patient would benefit from acute skilled PT services to address these deficits and reach maximum level of function.    Recent Surgery: Procedure(s) (LRB):  COLON (N/A) 8 Days Post-Op    Plan:     During this hospitalization, patient would benefit from acute PT services 3 x/week to address the identified rehab impairments via therapeutic activities, therapeutic exercises, neuromuscular re-education, wheelchair management/training and progress toward the following goals:    Plan of Care Expires:  12/11/24    Subjective     Chief Complaint:   Patient/Family Comments/goals:   Pain/Comfort:  Pain Rating 1: 0/10      Objective:     Communicated with NSG prior to session.  Patient found HOB elevated with colostomy, central line (suprapubic catheter) upon PT entry to room.     General Precautions: Standard, contact (BUE limb alerts)  Orthopedic Precautions: N/A  Braces: N/A  Respiratory Status: Nasal cannula, flow 2 L/min  Blood Pressure:   Skin Integrity: Visible skin intact      Functional Mobility:  Bed Mobility:      Scooting: minimum assistance and with use of trapeze   Supine to Sit: maximal assistance  Sit to Supine: maximal assistance  Transfers:     Bed to Chair: maximal assistance and of 3 persons with  wheelchair   using  Squat Pivot and pt lifted from EOB<>w/c 2/2 sand bed not going low enough to get pt feet onto floor.   Stat sitting: pt sat EOB CGA-Félix for roughly 10min.  Dyn sitting: pt performing UE therex and required Félix for sitting balance. Pt with 3 minimal LOBs.     Patient left HOB elevated with all lines intact, call button in reach, and pressure relief boots    GOALS:   Multidisciplinary Problems       Physical Therapy Goals          Problem: Physical Therapy    Goal Priority Disciplines Outcome Interventions   Physical Therapy Goal     PT, PT/OT Progressing    Description: Goals to be met by: 24     Patient will increase functional independence with mobility by performin. Supine to sit with Stand-by Assistance  2. Sit to supine with Stand-by Assistance  3. Bed to chair transfer with Stand-by Assistance using Slideboard  4. Wheelchair propulsion x 150 feet with Supervision using bilateral upper extremities                       Time Tracking:     PT Received On: 24  PT Start Time: 918     PT Stop Time:   PT Total Time (min): 53 min     Billable Minutes: Therapeutic Activity 53    Treatment Type: Treatment  PT/PTA: PTA     Number of PTA visits since last PT visit: 3     2024
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
## 2024-11-21 NOTE — CONSULTS
Inpatient Nutrition Assessment    Admit Date: 10/8/2024   Total duration of encounter: 44 days   Patient Age: 59 y.o.    Nutrition Recommendation/Prescription     -Continue 2000 Calorie Diabetic Diet as tolerated. Encourage po intake of meals.    -Continue Malachi BID for wound healing.  -Continue appetite stimulant once medically feasible.   -Monitor wt, labs, and intake.     Communication of Recommendations: reviewed with patient    Nutrition Assessment     Malnutrition Assessment/Nutrition-Focused Physical Exam    Malnutrition Context: acute illness or injury (10/17/24 1218)  Malnutrition Level: moderate (10/17/24 1218)  Energy Intake (Malnutrition):  (family denies) (10/17/24 1218)  Weight Loss (Malnutrition):  (does not meet criteria) (10/17/24 1218)  Subcutaneous Fat (Malnutrition): mild depletion (10/17/24 1218)     Upper Arm Region (Subcutaneous Fat Loss): mild depletion     Muscle Mass (Malnutrition): mild depletion (10/17/24 1218)     Clavicle Bone Region (Muscle Loss): mild depletion                    Fluid Accumulation (Malnutrition): mild (10/17/24 1218)        A minimum of two characteristics is recommended for diagnosis of either severe or non-severe malnutrition.    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 including ESBL E coli, Enterobacter, carbapenem resistant Pseudomonas, Enterococcus faecalis     Scheduled  Medications:  atorvastatin, 20 mg, Nightly  carvediloL, 50 mg, BID  doxycycline, 100 mg, Q12H  electrolytes-dextrose, 400 mL, BID AC  fenofibrate, 48 mg, Daily  FLUoxetine, 20 mg, Daily  gabapentin, 400 mg, Q8H  hydroCHLOROthiazide, 25 mg, Daily  hyoscyamine, 0.125 mg, Q4H  megestroL, 200 mg, BID  NIFEdipine, 90 mg, Daily  oxybutynin, 5 mg, TID  pantoprazole, 40 mg, BID AC  polyethylene glycol, 17 g, BID  senna-docusate 8.6-50 mg, 2 tablet, BID  sodium chloride 0.9%, 10 mL, Q6H    Continuous Infusions:       PRN Medications:   0.9%  NaCl infusion (for blood administration), , Q24H PRN  0.9%  NaCl infusion (for blood administration), , Q24H PRN  0.9%  NaCl infusion (for blood administration), , Q24H PRN  0.9%  NaCl infusion (for blood administration), , Q24H PRN  acetaminophen, 650 mg, Q4H PRN  albuterol-ipratropium, 3 mL, Q4H PRN  aluminum-magnesium hydroxide-simethicone, 30 mL, QID PRN  dextrose 10%, 12.5 g, PRN  dextrose 10%, 25 g, PRN  diphenhydrAMINE, 50 mg, Q6H PRN  etomidate, , Code/trauma/sedation Med  glucagon (human recombinant), 1 mg, PRN  guaiFENesin 100 mg/5 ml, 200 mg, Q4H PRN  hydrALAZINE, 20 mg, Q4H PRN  hydrOXYzine HCL, 25 mg, BID PRN  methocarbamoL, 750 mg, TID PRN  morphine, 2 mg, BID PRN  ondansetron, 4 mg, Q4H PRN  oxyCODONE-acetaminophen, 1 tablet, Q4H PRN  prochlorperazine, 5 mg, Q6H PRN  rocuronium, , Code/trauma/sedation Med  sodium chloride 0.9%, 10 mL, PRN  sodium chloride 0.9%, 10 mL, PRN  traZODone, 200 mg, Nightly PRN    Calorie Containing IV Medications: no significant kcals from medications at this time    Recent Labs   Lab 11/15/24  0657 11/16/24  0614 11/18/24  0534 11/19/24  1038 11/20/24  0502 11/21/24  0628    133* 136  --   --  134*   K 4.1 4.3 4.7  --   --  4.4   CALCIUM 8.3* 8.3* 8.5*  --   --  8.8    102 103  --   --  100   CO2 26 25 27  --   --  27   BUN 15.0 16.0 18.1  --   --  26.8*   CREATININE 1.09 1.15 1.23  --   --  1.21   EGFRNORACEVR >60 >60 >60  --   --   >60   GLUCOSE 88 88 95  --   --  105   BILITOT  --   --  0.6  --   --   --    ALKPHOS  --   --  81  --   --   --    ALT  --   --  5  --   --   --    AST  --   --  23  --   --   --    ALBUMIN  --   --  1.7*  --   --   --    PREALB  --   --   --  9.6* 10.1*  --    WBC 7.65 8.87 10.07  --   --  10.01   HGB 9.1* 10.0* 9.7*  --   --  9.2*   HCT 26.7* 29.8* 29.9*  --   --  28.2*     Nutrition Orders:  Diet Consistent Carbohydrate 2000 Calories (up to 75 gm per meal)  Dietary nutrition supplements BID; Malachi - Fruit Punch    Appetite/Oral Intake: poor/25-50% of meals  Factors Affecting Nutritional Intake: decreased appetite  Social Needs Impacting Access to Food: none identified  Food/Catholic/Cultural Preferences: none reported  Food Allergies: none reported  Last Bowel Movement: 11/21/24  Wound(s):     Wound 11/15/24 0800 Pressure Injury Left posterior Greater trochanter-Tissue loss description: Full thickness       Altered Skin Integrity 06/28/23 2230 Right lateral Ankle #6-Tissue loss description: Full thickness       Wound 05/30/24 0000 Pressure Injury Right Buttocks-Tissue loss description: Full thickness       Altered Skin Integrity 01/09/24 0848 upper Sacral spine-Tissue loss description: Full thickness       Wound 08/22/24 0800 Other (comment) Left Heel-Tissue loss description: Full thickness    Comments    10/10: Pt was in surgery at time of visit. Having wound debridement with wound vac placement on Stage 4 wound of rt gluteus. Will add Malachi to assist with wound healing. Recommend vitamin regimen for wounds.     10/17/24 Patient transferred to ICU, on ventilator currently, no propofol. Spoke with patient's wife at bedside who reports patient was eating well with a good appetite prior to ICU transfer, she reports bringing him food from home, good intake of Malachi. Tube feeding recommendation provided.    10/18/24 Consult received for tube feeding, will order.    10/22/24: Per RN, pt did not want to really eat  "earlier; family present in the room and encouraging intake.     10/24/24: Per pt's family member, pt ate ~ half of breakfast. Pt denies n/v; would like a strawberry flavored oral supplement.     10/29/24: Pt still with decreased appetite; denies n/v; taking his oral supplements.     24: Pt with fair intake, eating a lot of outside foods; now on appetite stimulant; taking his Malachi; would only like Boost VHC once daily.     24: Per RN, taking in some; still on appetite stimulant.     24: Pt had colostomy creation yesterday; diet just advanced to clears.     24: Pt on regular diet and tolerating but not taking much in, less than half of his meals. Pt is just not hungry; denies n/v; states that he is taking the Malcahi but not drinking the Boost and would like to cancel the Boost.     11/15/24: Pt still with poor appetite/intake; denies n/v; eating some outside foods being brought in by family; taking the Malachi.     24: Pt still not eating much of meals; eating some outside foods; taking Malachi.    24: Pt eating fair with some foods, depends on what it is; eating mostly foods being brought from home. Pt was just started back on appetite stimulant; continues to take the Malachi.     Anthropometrics    Height: 5' 10" (177.8 cm), Height Method: Stated  Last Weight: 79.3 kg (174 lb 13.2 oz) (10/17/24 1212), Weight Method: Bed Scale  BMI (Calculated): 25.1  BMI Classification: normal (BMI 18.5-24.9)        Ideal Body Weight (IBW), Male: 166 lb     % Ideal Body Weight, Male (lb): 96.95 %                 Usual Body Weight (UBW), k.57 kg-77.11 kg  % Usual Body Weight: 109.5  % Weight Change From Usual Weight: 9.27 %  Usual Weight Provided By: family/caregiver    Wt Readings from Last 5 Encounters:   10/17/24 79.3 kg (174 lb 13.2 oz)   24 63.5 kg (139 lb 15.9 oz)   24 71.2 kg (156 lb 15.5 oz)   24 78.5 kg (173 lb 1 oz)   01/10/24 78.5 kg (173 lb 1 oz)     Weight Change(s) Since " Admission:   (10/17) took bed weight 79.3 kg during rounds (estimated 4 kg subtracted for equipment), increase noted  Wt Readings from Last 1 Encounters:   10/17/24 1212 79.3 kg (174 lb 13.2 oz)   10/09/24 0430 73 kg (160 lb 15 oz)   10/08/24 1719 63.5 kg (140 lb)   Admit Weight: 63.5 kg (140 lb) (10/08/24 1719), Weight Method: Stated  11/11/24: no new wt noted    Estimated Needs    Weight Used For Calorie Calculations: 79.3 kg (174 lb 13.2 oz)  Energy Calorie Requirements (kcal): 1040-9143 kcal (25-30 kcal/kg)  Energy Need Method: Kcal/kg  Weight Used For Protein Calculations: 79.3 kg (174 lb 13.2 oz)  Protein Requirements: 95 gm (1.2g/kg)  Fluid Requirements (mL): 1983 mL  CHO Requirement: 262-315 gm (45% EEN)  Last Updated: 10/22    Enteral Nutrition Patient not receiving enteral nutrition at this time.    Parenteral Nutrition Patient not receiving parenteral nutrition support at this time.    Evaluation of Received Nutrient Intake    Calories: not meeting estimated needs  Protein: not meeting estimated needs    Patient Education Not applicable.    Nutrition Diagnosis     PES: Inadequate energy intake related to inability to consume sufficient nutrients as evidenced by less than 80% needs met. (active)  PES: Moderate acute disease or injury related malnutrition related to acute illness as evidenced by mild fat depletion, mild muscle depletion, and edema. (active)    Nutrition Interventions     Intervention(s): modified composition of enteral nutrition, modified rate of enteral nutrition, and collaboration with other providers  Goal: Meet greater than 80% of nutritional needs by follow-up. (goal progressing)  Goal: Tolerate enteral feeding at goal rate by follow-up. (goal discontinued)    Nutrition Goals & Monitoring     Dietitian will monitor: food and beverage intake, energy intake, weight, and electrolyte/renal panel  Discharge planning:  Diabetic Diet with Malachi BID   Nutrition Risk/Follow-Up: high (follow-up  in 1-4 days)   Please consult if re-assessment needed sooner.
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
## 2024-11-22 PROBLEM — L89.320 UNSTAGEABLE PRESSURE ULCER OF LEFT BUTTOCK: Status: ACTIVE | Noted: 2024-11-19

## 2024-11-22 LAB
POCT GLUCOSE: 125 MG/DL (ref 70–110)
POCT GLUCOSE: 93 MG/DL (ref 70–110)

## 2024-11-22 PROCEDURE — 25000003 PHARM REV CODE 250: Performed by: STUDENT IN AN ORGANIZED HEALTH CARE EDUCATION/TRAINING PROGRAM

## 2024-11-22 PROCEDURE — 94799 UNLISTED PULMONARY SVC/PX: CPT

## 2024-11-22 PROCEDURE — 99900035 HC TECH TIME PER 15 MIN (STAT)

## 2024-11-22 PROCEDURE — 94664 DEMO&/EVAL PT USE INHALER: CPT

## 2024-11-22 PROCEDURE — 25000003 PHARM REV CODE 250

## 2024-11-22 PROCEDURE — S0179 MEGESTROL 20 MG: HCPCS | Performed by: HOSPITALIST

## 2024-11-22 PROCEDURE — 25000003 PHARM REV CODE 250: Performed by: INTERNAL MEDICINE

## 2024-11-22 PROCEDURE — 27000221 HC OXYGEN, UP TO 24 HOURS

## 2024-11-22 PROCEDURE — 21400001 HC TELEMETRY ROOM

## 2024-11-22 PROCEDURE — 27000207 HC ISOLATION

## 2024-11-22 PROCEDURE — 25000003 PHARM REV CODE 250: Performed by: NURSE PRACTITIONER

## 2024-11-22 PROCEDURE — 99900031 HC PATIENT EDUCATION (STAT)

## 2024-11-22 PROCEDURE — 11000001 HC ACUTE MED/SURG PRIVATE ROOM

## 2024-11-22 PROCEDURE — 94760 N-INVAS EAR/PLS OXIMETRY 1: CPT

## 2024-11-22 PROCEDURE — A4216 STERILE WATER/SALINE, 10 ML: HCPCS | Performed by: STUDENT IN AN ORGANIZED HEALTH CARE EDUCATION/TRAINING PROGRAM

## 2024-11-22 PROCEDURE — 25000003 PHARM REV CODE 250: Performed by: HOSPITALIST

## 2024-11-22 PROCEDURE — 99233 SBSQ HOSP IP/OBS HIGH 50: CPT | Mod: ,,,

## 2024-11-22 RX ADMIN — Medication 400 ML: at 04:11

## 2024-11-22 RX ADMIN — CARVEDILOL 50 MG: 12.5 TABLET, FILM COATED ORAL at 09:11

## 2024-11-22 RX ADMIN — SENNOSIDES AND DOCUSATE SODIUM 2 TABLET: 50; 8.6 TABLET ORAL at 09:11

## 2024-11-22 RX ADMIN — OXYBUTYNIN CHLORIDE 5 MG: 5 TABLET ORAL at 09:11

## 2024-11-22 RX ADMIN — SODIUM CHLORIDE, PRESERVATIVE FREE 10 ML: 5 INJECTION INTRAVENOUS at 05:11

## 2024-11-22 RX ADMIN — PANTOPRAZOLE SODIUM 40 MG: 40 TABLET, DELAYED RELEASE ORAL at 05:11

## 2024-11-22 RX ADMIN — SODIUM CHLORIDE, PRESERVATIVE FREE 10 ML: 5 INJECTION INTRAVENOUS at 11:11

## 2024-11-22 RX ADMIN — DOXYCYCLINE HYCLATE 100 MG: 100 TABLET, COATED ORAL at 09:11

## 2024-11-22 RX ADMIN — SODIUM CHLORIDE, PRESERVATIVE FREE 10 ML: 5 INJECTION INTRAVENOUS at 06:11

## 2024-11-22 RX ADMIN — MEGESTROL ACETATE 200 MG: 400 SUSPENSION ORAL at 09:11

## 2024-11-22 RX ADMIN — ATORVASTATIN CALCIUM 20 MG: 10 TABLET, FILM COATED ORAL at 09:11

## 2024-11-22 RX ADMIN — HYDROCHLOROTHIAZIDE 25 MG: 25 TABLET ORAL at 09:11

## 2024-11-22 RX ADMIN — FLUOXETINE 20 MG: 20 CAPSULE ORAL at 09:11

## 2024-11-22 RX ADMIN — HYOSCYAMINE SULFATE 0.12 MG: 0.12 TABLET SUBLINGUAL at 09:11

## 2024-11-22 RX ADMIN — OXYCODONE AND ACETAMINOPHEN 1 TABLET: 10; 325 TABLET ORAL at 05:11

## 2024-11-22 RX ADMIN — GABAPENTIN 400 MG: 400 CAPSULE ORAL at 09:11

## 2024-11-22 RX ADMIN — HYOSCYAMINE SULFATE 0.12 MG: 0.12 TABLET SUBLINGUAL at 05:11

## 2024-11-22 RX ADMIN — OXYBUTYNIN CHLORIDE 5 MG: 5 TABLET ORAL at 02:11

## 2024-11-22 RX ADMIN — TRAZODONE HYDROCHLORIDE 200 MG: 100 TABLET ORAL at 09:11

## 2024-11-22 RX ADMIN — GABAPENTIN 400 MG: 400 CAPSULE ORAL at 05:11

## 2024-11-22 RX ADMIN — OXYCODONE AND ACETAMINOPHEN 1 TABLET: 10; 325 TABLET ORAL at 06:11

## 2024-11-22 RX ADMIN — POLYETHYLENE GLYCOL 3350 17 G: 17 POWDER, FOR SOLUTION ORAL at 09:11

## 2024-11-22 RX ADMIN — HYOSCYAMINE SULFATE 0.12 MG: 0.12 TABLET SUBLINGUAL at 02:11

## 2024-11-22 RX ADMIN — OXYCODONE AND ACETAMINOPHEN 1 TABLET: 10; 325 TABLET ORAL at 01:11

## 2024-11-22 RX ADMIN — Medication 400 ML: at 06:11

## 2024-11-22 RX ADMIN — OXYCODONE AND ACETAMINOPHEN 1 TABLET: 10; 325 TABLET ORAL at 09:11

## 2024-11-22 RX ADMIN — FENOFIBRATE 48 MG: 48 TABLET, FILM COATED ORAL at 09:11

## 2024-11-22 RX ADMIN — NIFEDIPINE 90 MG: 90 TABLET, FILM COATED, EXTENDED RELEASE ORAL at 09:11

## 2024-11-22 RX ADMIN — GABAPENTIN 400 MG: 400 CAPSULE ORAL at 02:11

## 2024-11-22 NOTE — PROGRESS NOTES
Infectious Diseases Progress Note  58 y/o male with past medical history of T-spine cord injury with paraplegia, diabetes, HTN, hepatitis-C, chronic MRSA L ankle wound/osteomyelitis, was on suppressive doxycycline and R knee infection/septic arthritis and osteomyelitis with GBS/Enterococcus and Ecoli isolates who presented to ER on 10/8 with increased generalized pain, increased foul smelling drainage from sacral wound with fever and chills. He was noted to be hypotensive per EMS. On admit he was afebrile without leukocytosis. ESR 98 and .80. MRSA PCR (-). U/A with 21-50 WBC, 0-5 RBC, 75 LE, no bacteria, negative nitrites. Urine culture negative. Blood cultures negative. Sacral wound culture with many Acinetobacter, many ESBL Ecoli, moderate Enterococcus, Bacteroides and Peptoniphilus. CT pelvis with contrast showed worsening inflammatory change of the sacral decubitus ulcers with gas now identified inferior to the right pubic ramus, stool noted throughout the colon as may be seen with constipation, pyelonephritis is not excluded. Seen by Surgery and underwent debridement of sacral wound. During his stay he was noted to have large amounts of bleeding from sacral wound. CT abdomen/pelvis on 10/17 showed interval development of L renal rupture with large subcapsular hematoma, L retroperitoneal hemorrhage. He received multiple blood/blood products over the past couple days. He was noted to be hypotensive and was transferred to ICU on 10/17. He was seen by Vascular and underwent Aortogram,  left renal angiography with coil embolization of left renal artery and right groin central venous line insertion on 10/17. Sputum culture on 108/23 revealing Pseudomonas. RUE venous ultrasound positive for deep  vein thrombosis in brachial and radial veins of the right upper extremity. Sputum culture with MDR Pseudomonas. S/P colostomy placement on 11/6.  He is currently on chronic oral suppression with  Doxycycline    Subjective:  Sleeping in bed, no acute distress. Currently on 1L NC. Afebrile.     Review of Systems   Constitutional:  Positive for malaise/fatigue.   HENT: Negative.     Eyes: Negative.    Respiratory:  Positive for shortness of breath.    Cardiovascular: Negative.    Gastrointestinal: Negative.    Musculoskeletal: Negative.    Skin: Negative.    Neurological:  Positive for focal weakness and weakness.   All other systems reviewed and are negative.      Review of patient's allergies indicates:   Allergen Reactions    Baclofen Itching and Anxiety       Past Medical History:   Diagnosis Date    Arthritis     Chronic ulcer of ankle 05/26/2022    ESBL (extended spectrum beta-lactamase) producing bacteria infection 08/30/2024    Frequent UTI 07/02/2019    Generalized anxiety disorder 05/26/2022    Neurogenic bladder 05/26/2022    Osteomyelitis 05/26/2022    Paraplegia     Presence of suprapubic catheter 05/26/2022    Pure hypercholesterolemia 05/26/2022    Retention of urine, unspecified 08/09/2019    Spinal cord injury at T1-T6 level 04/20/2018       Past Surgical History:   Procedure Laterality Date    ANGIOGRAM, RENAL ARTERIES, BILATERAL N/A 10/17/2024    Procedure: Angiogram, Renal Arteries, Bilateral;  Surgeon: Pati King MD;  Location: [facility] CATH LAB;  Service: Peripheral Vascular;  Laterality: N/A;  left renal artery coiling    APPLICATION OF WOUND VACUUM-ASSISTED CLOSURE DEVICE N/A 10/10/2024    Procedure: APPLICATION, WOUND VAC;  Surgeon: Rob Sinclair MD;  Location: [facility] OR;  Service: General;  Laterality: N/A;    COLONOSCOPY N/A 11/13/2024    Procedure: COLON;  Surgeon: Thanh Olson MD;  Location: [facility] ENDOSCOPY;  Service: Gastroenterology;  Laterality: N/A;  though ostomy    CREATION, COLOSTOMY, LAPAROSCOPIC N/A 11/6/2024    Procedure: CREATION, COLOSTOMY, LAPAROSCOPIC CONVERTED  TO OPEN;  Surgeon: Rob Sinclair MD;  Location: [facility] OR;  Service: General;  Laterality:  N/A;    EGD, WITH CLOSED BIOPSY N/A 8/29/2024    Procedure: EGD, WITH CLOSED BIOPSY;  Surgeon: Mikal Segovia MD;  Location: [facility] ENDOSCOPY;  Service: Gastroenterology;  Laterality: N/A;    ESOPHAGOGASTRODUODENOSCOPY N/A 8/29/2024    Procedure: EGD;  Surgeon: Mikal Segovia MD;  Location: [facility] ENDOSCOPY;  Service: Gastroenterology;  Laterality: N/A;    FRACTURE SURGERY  2021    INCISION AND DRAINAGE, LOWER EXTREMITY Right 06/29/2023    Procedure: INCISION AND DRAINAGE, LOWER EXTREMITY;  Surgeon: Prabhu Shen DO;  Location: [facility] OR;  Service: Orthopedics;  Laterality: Right;  supine bone foam wash stuff cultures    INCISION AND DRAINAGE, LOWER EXTREMITY Right 11/30/2023    Procedure: INCISION AND DRAINAGE, LOWER EXTREMITY;  Surgeon: Prabhu Shen DO;  Location: [facility];  Service: Orthopedics;  Laterality: Right;  supine any table bone foam wash stuff possible wound vac    INCISION AND DRAINAGE, LOWER EXTREMITY Right 12/1/2023    Procedure: INCISION AND DRAINAGE, LOWER EXTREMITY;  Surgeon: Prabhu Shen DO;  Location: [facility];  Service: Orthopedics;  Laterality: Right;  supine bone foam vascular bed removal of distal femoral plate, wash stuff, possible AKA    INSERTION OF INTRAMEDULLARY MARISA Right 08/10/2022    Procedure: INSERTION, INTRAMEDULLARY MARISA RIGHT TIBIA;  Surgeon: Jorge Orellana MD;  Location: [facility];  Service: Orthopedics;  Laterality: Right;    JOINT REPLACEMENT  2021    Ankel    PRESSURE ULCER DEBRIDEMENT N/A 10/10/2024    Procedure: DEBRIDEMENT, PRESSURE ULCER;  Surgeon: Rob Sinclair MD;  Location: [facility];  Service: General;  Laterality: N/A;  sacral wound, R buttock wound    REMOVAL OF HARDWARE FROM LOWER EXTREMITY Right 12/1/2023    Procedure: REMOVAL, HARDWARE, LOWER EXTREMITY;  Surgeon: Prabhu Shen DO;  Location: [facility];  Service: Orthopedics;  Laterality: Right;    SPINE SURGERY  March 1st 2018       Social History     Socioeconomic History    Marital status:     Tobacco Use    Smoking status: Some Days     Current packs/day: 0.00     Average packs/day: 0.2 packs/day for 41.5 years (10.4 ttl pk-yrs)     Types: Cigars, Cigarettes     Start date: 1982     Last attempt to quit: 2023     Years since quittin.3    Smokeless tobacco: Never   Substance and Sexual Activity    Alcohol use: Not Currently     Alcohol/week: 2.0 standard drinks of alcohol     Types: 2 Cans of beer per week    Drug use: Not Currently     Types: Oxycodone    Sexual activity: Not Currently     Partners: Female     Birth control/protection: None     Social Drivers of Health     Financial Resource Strain: Low Risk  (10/10/2024)    Overall Financial Resource Strain (CARDIA)     Difficulty of Paying Living Expenses: Not hard at all   Food Insecurity: No Food Insecurity (10/10/2024)    Hunger Vital Sign     Worried About Running Out of Food in the Last Year: Never true     Ran Out of Food in the Last Year: Never true   Transportation Needs: No Transportation Needs (10/10/2024)    TRANSPORTATION NEEDS     Transportation : No   Physical Activity: Inactive (2023)    Exercise Vital Sign     Days of Exercise per Week: 0 days     Minutes of Exercise per Session: 0 min   Stress: No Stress Concern Present (10/10/2024)    [employer] - Occupational Stress Questionnaire     Feeling of Stress : Only a little   Housing Stability: Low Risk  (10/10/2024)    Housing Stability Vital Sign     Unable to Pay for Housing in the Last Year: No     Homeless in the Last Year: No       Scheduled Meds:   atorvastatin  20 mg Oral Nightly    carvediloL  50 mg Oral BID    doxycycline  100 mg Oral Q12H    electrolytes-dextrose  400 mL Oral BID AC    fenofibrate  48 mg Oral Daily    FLUoxetine  20 mg Oral Daily    gabapentin  400 mg Oral Q8H    hydroCHLOROthiazide  25 mg Oral Daily    hyoscyamine  0.125 mg Sublingual Q4H    megestroL  200 mg Oral BID    NIFEdipine  90 mg Oral Daily     "oxybutynin  5 mg Oral TID    pantoprazole  40 mg Oral BID AC    polyethylene glycol  17 g Oral BID    senna-docusate 8.6-50 mg  2 tablet Oral BID    sodium chloride 0.9%  10 mL Intravenous Q6H     Continuous Infusions:        PRN Meds:  Current Facility-Administered Medications:     0.9%  NaCl infusion (for blood administration), , Intravenous, Q24H PRN    0.9%  NaCl infusion (for blood administration), , Intravenous, Q24H PRN    0.9%  NaCl infusion (for blood administration), , Intravenous, Q24H PRN    0.9%  NaCl infusion (for blood administration), , Intravenous, Q24H PRN    acetaminophen, 650 mg, Oral, Q4H PRN    albuterol-ipratropium, 3 mL, Nebulization, Q4H PRN    aluminum-magnesium hydroxide-simethicone, 30 mL, Oral, QID PRN    dextrose 10%, 12.5 g, Intravenous, PRN    dextrose 10%, 25 g, Intravenous, PRN    diphenhydrAMINE, 50 mg, Intravenous, Q6H PRN    etomidate, , Intravenous, Code/trauma/sedation Med    glucagon (human recombinant), 1 mg, Intramuscular, PRN    guaiFENesin 100 mg/5 ml, 200 mg, Oral, Q4H PRN    hydrALAZINE, 20 mg, Intravenous, Q4H PRN    hydrOXYzine HCL, 25 mg, Oral, BID PRN    methocarbamoL, 750 mg, Oral, TID PRN    morphine, 2 mg, Intravenous, BID PRN    ondansetron, 4 mg, Intravenous, Q4H PRN    oxyCODONE-acetaminophen, 1 tablet, Oral, Q4H PRN    prochlorperazine, 5 mg, Intravenous, Q6H PRN    rocuronium, , Intravenous, Code/trauma/sedation Med    sodium chloride 0.9%, 10 mL, Intravenous, PRN    Flushing PICC/Midline Protocol, , , Until Discontinued **AND** sodium chloride 0.9%, 10 mL, Intravenous, Q6H **AND** sodium chloride 0.9%, 10 mL, Intravenous, PRN    traZODone, 200 mg, Oral, Nightly PRN    Objective:  BP (!) 147/79   Pulse 82   Temp 97.9 °F (36.6 °C) (Oral)   Resp 16   Ht 5' 10" (1.778 m)   Wt 79.3 kg (174 lb 13.2 oz)   SpO2 100%   BMI 25.08 kg/m²     Physical Exam:   Physical Exam  Vitals reviewed.   HENT:      Head: Normocephalic.   Cardiovascular:      Rate and Rhythm: " Normal rate and regular rhythm.   Pulmonary:      Effort: Pulmonary effort is normal. No respiratory distress.      Breath sounds: Normal breath sounds. No wheezing.      Comments: Currently on 1L NC  Abdominal:      General: Bowel sounds are normal. There is no distension.      Palpations: Abdomen is soft.      Tenderness: There is no abdominal tenderness.   Genitourinary:     Comments: Suprapubic catheter  Musculoskeletal:      Cervical back: Normal range of motion.      Comments: Paraplegia   Skin:     Findings: No rash.      Comments: Wounds dressed. R chest tunneled central line   Neurological:      Mental Status: He is alert and oriented to person, place, and time.   Psychiatric:         Behavior: Behavior normal.     Imaging    Lab Review   Recent Results (from the past 24 hours)   Basic Metabolic Panel    Collection Time: 11/21/24  6:28 AM   Result Value Ref Range    Sodium 134 (L) 136 - 145 mmol/L    Potassium 4.4 3.5 - 5.1 mmol/L    Chloride 100 98 - 107 mmol/L    CO2 27 23 - 31 mmol/L    Glucose 105 82 - 115 mg/dL    Blood Urea Nitrogen 26.8 (H) 8.4 - 25.7 mg/dL    Creatinine 1.21 0.72 - 1.25 mg/dL    BUN/Creatinine Ratio 22     Calcium 8.8 8.8 - 10.0 mg/dL    Anion Gap 7.0 mEq/L    eGFR >60 mL/min/1.73/m2   CBC with Differential    Collection Time: 11/21/24  6:28 AM   Result Value Ref Range    WBC 10.01 4.50 - 11.50 x10(3)/mcL    RBC 3.15 (L) 4.70 - 6.10 x10(6)/mcL    Hgb 9.2 (L) 14.0 - 18.0 g/dL    Hct 28.2 (L) 42.0 - 52.0 %    MCV 89.5 80.0 - 94.0 fL    MCH 29.2 27.0 - 31.0 pg    MCHC 32.6 (L) 33.0 - 36.0 g/dL    RDW 15.6 11.5 - 17.0 %    Platelet 554 (H) 130 - 400 x10(3)/mcL    MPV 9.0 7.4 - 10.4 fL    Neut % 59.7 %    Lymph % 30.5 %    Mono % 9.2 %    Eos % 0.2 %    Basophil % 0.1 %    Lymph # 3.05 0.6 - 4.6 x10(3)/mcL    Neut # 5.98 2.1 - 9.2 x10(3)/mcL    Mono # 0.92 0.1 - 1.3 x10(3)/mcL    Eos # 0.02 0 - 0.9 x10(3)/mcL    Baso # 0.01 <=0.2 x10(3)/mcL    IG# 0.03 0 - 0.04 x10(3)/mcL    IG% 0.3 %     NRBC% 0.0 %   POCT glucose    Collection Time: 11/21/24 11:15 AM   Result Value Ref Range    POCT Glucose 107 70 - 110 mg/dL       Assessment/Plan:  Sepsis - resolved  Sacral wound infection - CR Acinetobacter / ESBL Ecoli / Enterococcus / Bacteroides / Peptoniphilus isolates  Pneumonitis / Pleural effusions  MDR Pseudomonas (+) sputum  ANTON  Pyelonephritis per CT   Constipation  Paraplegia  Neurogenic bladder with suprapubic catheter  DM Type II  MRSA L ankle osteomyelitis with retained hardware  Anemia    -On chronic suppressive antibiotic therapy with oral Doxycycline  -Remains afebrile without leukocytosis, follow  -Received multiple units of blood and blood products during his stay  -Urology consult for leakage around the suprapubic catheter, inputs noted  -Currently on 1L NC, wean as tolerated  -Repeat CXR on 10/26 with pulmonary edema, bilateral effusions, lower lobe atelectasis unchanged.   -CXR on 10/21 with B/L pleural effusions with associated atelectasis and/or infiltrate  -Sputum culture on 10/23 with moderate Pseudomonas species, follow  -Pt with significant anemia, CT abdomen/pelvis showed L renal rupture with large subcapsular hematoma  -Seen by Vascular, inputs noted. S/P aortogram, left renal angiography with coil embolization of left renal artery on 10/17  -CT pelvis with contrast showed worsening inflammatory change of sacral ulcer with gas inferior to the R pubic ramus  -S/P debridement of sacral wound on 10/10  -Sacral wound cultures revealing CR Acinetobacter, ESBL Ecoli, Enterococcus, Bacteroides, Peptoniphilus isolates  -Continue wound care  -Surgery following. S/P colostomy placement on 11/6  -Blood cultures negative   -Discussed with nursing staff. Pt is a DNR and considering discharge home with Hospice
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
## 2024-11-22 NOTE — OP NOTE
[facility] - 8th Floor Med Surg  Operative Note     SUMMARY      Surgery Date: 11/6/2024      Surgeons and Role:     * Rob Sinclair MD - Primary     * Yaya Oconnell MD - Resident - Assisting     * Anita Morse MD - Resident - Assisting     Pre-op Diagnosis:  Sacral wound [S31.000A]     Post-op Diagnosis:  Post-Op Diagnosis Codes:     * Sacral wound [S31.000A]     Procedure(s) (LRB):  CREATION, COLOSTOMY, LAPAROSCOPIC CONVERTED  TO OPEN (N/A)     Anesthesia: General     Implants:  * No implants in log *     Operative Findings: Creation of loop colostomy in LUQ, Laparoscopic approach converted to open 2/2 dense scarring of intestines to abdominal wall and retroperitoneum limiting mobilization of the colon      Estimated Blood Loss:      Estimated Blood Loss has not been documented. EBL = 20mL .         Specimens:   Specimen (24h ago, onward)        None     Operative Technique:  Risks and benefits were discussed with patient and family at bedside, informed consent signed.  Patient was taken to the OR and placed supine, endotracheal intubation was successful.  The abdomen was then prepped and draped in sterile fashion.  A time out was completed to confirm correct patient, procedure, and all staff was in agreement.      Using a veress needle the abdomen was insufflated.  A 5mm optical port was then placed in the supraumbilical area without issue.  There were no obvious injuries from entry.  Two additional 5mm ports were placed without issue.  The descending colon was inspected.  It was noted to be densely adherent to the left lateral side wall.  At this point decision was made to open the abdomen.  A midline incision was made and the abdomen was entered and explored.  The descending colon was mobilized from a lateral to medial fashion with blunt and sharp dissection.  Once there was adequate length for the colon to reach the abdominal wall, an opening was made in the abdominal wall to allow for  two fingers to pass easily.  The colon was delivered through the opening and red rubber was used to secure it in place.  The midline fascia was then closed with running PDS and skin closed with staples.  Wound was protected and the loop colostomy was matured with vicryl.  The ostomy appeared pink and patent.  An ostomy appliance was then placed.  Patient tolerated procedure well, was extubated and transferred to recovery in stable condition.      SURGEON:  Rob Sinclair MD    Date: 11/6/2024  Time: 11:48 AM

## 2024-11-22 NOTE — PROGRESS NOTES
[facility] - 8th Floor Med Surg  Wound Care    Patient Name:  Bianca Khan   MRN:  [id number]  Date: 2024  Diagnosis: Pressure ulcer of sacral region, stage 4    History:     Past Medical History:   Diagnosis Date    Arthritis     Chronic ulcer of ankle 2022    ESBL (extended spectrum beta-lactamase) producing bacteria infection 2024    Frequent UTI 2019    Generalized anxiety disorder 2022    Neurogenic bladder 2022    Osteomyelitis 2022    Paraplegia     Presence of suprapubic catheter 2022    Pure hypercholesterolemia 2022    Retention of urine, unspecified 2019    Spinal cord injury at T1-T6 level 2018       Social History     Socioeconomic History    Marital status:    Tobacco Use    Smoking status: Some Days     Current packs/day: 0.00     Average packs/day: 0.2 packs/day for 41.5 years (10.4 ttl pk-yrs)     Types: Cigars, Cigarettes     Start date: 1982     Last attempt to quit: 2023     Years since quittin.3    Smokeless tobacco: Never   Substance and Sexual Activity    Alcohol use: Not Currently     Alcohol/week: 2.0 standard drinks of alcohol     Types: 2 Cans of beer per week    Drug use: Not Currently     Types: Oxycodone    Sexual activity: Not Currently     Partners: Female     Birth control/protection: None     Social Drivers of Health     Financial Resource Strain: Low Risk  (10/10/2024)    Overall Financial Resource Strain (CARDIA)     Difficulty of Paying Living Expenses: Not hard at all   Food Insecurity: No Food Insecurity (10/10/2024)    Hunger Vital Sign     Worried About Running Out of Food in the Last Year: Never true     Ran Out of Food in the Last Year: Never true   Transportation Needs: No Transportation Needs (10/10/2024)    TRANSPORTATION NEEDS     Transportation : No   Physical Activity: Inactive (2023)    Exercise Vital Sign     Days of Exercise per Week: 0 days     Minutes of  Exercise per Session: 0 min   Stress: No Stress Concern Present (10/10/2024)    [place] [facility] [employer] - Occupational Stress Questionnaire     Feeling of Stress : Only a little   Housing Stability: Low Risk  (10/10/2024)    Housing Stability Vital Sign     Unable to Pay for Housing in the Last Year: No     Homeless in the Last Year: No       Precautions:     Allergies as of 10/08/2024 - Reviewed 10/08/2024   Allergen Reaction Noted    Baclofen Itching and Anxiety 06/17/2019       [facility] Assessment Details/Treatment        11/22/24 1046        Altered Skin Integrity 06/28/23 2230 Right lateral Ankle #6   Date First Assessed/Time First Assessed: 06/28/23 2230   Altered Skin Integrity Present on Admission - Did Patient arrive to the hospital with altered skin?: yes  Side: Right  Orientation: lateral  Location: Ankle  Wound Number: #6  Is this injury dev...   Wound Image    Description of Altered Skin Integrity Full thickness tissue loss. Subcutaneous fat may be visible but bone, tendon or muscle are not exposed   Dressing Appearance Intact;Moist drainage   Drainage Amount Small   Drainage Characteristics/Odor Creamy   Appearance Pink;Red   Tissue loss description Partial thickness   Black (%), Wound Tissue Color 0 %   Red (%), Wound Tissue Color 60 %   Yellow (%), Wound Tissue Color 40 %   Periwound Area Pale white;Scar tissue   Wound Edges Callused   Wound Length (cm) 1.4 cm   Wound Width (cm) 1 cm   Wound Depth (cm) 0.1 cm   Wound Volume (cm^3) 0.14 cm^3   Wound Surface Area (cm^2) 1.4 cm^2   Care Cleansed with:;Wound cleanser  (vashe)   Dressing Changed;Calcium alginate;Silver;Foam   Off Loading Off loading shoe        Altered Skin Integrity 01/09/24 0848 upper Sacral spine   Date First Assessed/Time First Assessed: 01/09/24 0848   Altered Skin Integrity Present on Admission - Did Patient arrive to the hospital with altered skin?: suspected hospital acquired  Orientation: upper  Location: Sacral spine    Wound Image    Description of Altered Skin Integrity Full thickness tissue loss with exposed bone, tendon, or muscle. Often includes undermining and tunneling. May extend into muscle and/or supporting structures.   Dressing Appearance Dry;Intact   Drainage Amount Small   Drainage Characteristics/Odor Serosanguineous   Appearance Pink;Red;Maroon   Tissue loss description Full thickness   Black (%), Wound Tissue Color 10 %   Red (%), Wound Tissue Color 80 %   Yellow (%), Wound Tissue Color 10 %   Periwound Area Pink;Scar tissue   Wound Edges Defined   Wound Length (cm) 7 cm   Wound Width (cm) 5 cm   Wound Depth (cm) 2 cm   Wound Volume (cm^3) 70 cm^3   Wound Surface Area (cm^2) 35 cm^2   Undermining (depth (cm)/location) 5-7 oclock @1.6cm   Care Cleansed with:;Wound cleanser  (vashe)   Dressing Changed;Gauze, wet to dry;Absorptive Pad   Off Loading Other (see comments)  (on envella mattress and uses purple wedge)        Wound 05/30/24 0000 Pressure Injury Right Buttocks   Date First Assessed/Time First Assessed: 05/30/24 0000   Primary Wound Type: Pressure Injury  Side: Right  Location: Buttocks  Is this injury device related?: No   Wound Image    Pressure Injury Stage 4   Dressing Appearance Intact;Moist drainage   Drainage Amount Small   Drainage Characteristics/Odor Serosanguineous   Appearance Pink;Red;Yellow   Tissue loss description Full thickness   Black (%), Wound Tissue Color 0 %   Red (%), Wound Tissue Color 80 %   Yellow (%), Wound Tissue Color 20 %   Periwound Area Macerated;Pale white;Scar tissue   Wound Edges Defined   Wound Length (cm) 5.5 cm   Wound Width (cm) 7 cm   Wound Depth (cm) 3 cm   Wound Volume (cm^3) 115.5 cm^3   Wound Surface Area (cm^2) 38.5 cm^2   Undermining (depth (cm)/location) UM 8-4 oclcock deepest at 11 oclock-4cm   Care Cleansed with:;Wound cleanser  (vashe)   Dressing Changed;Gauze, wet to dry;Absorptive Pad   Off Loading Other (see comments)  (on envella mattress)        Wound  08/22/24 0800 Other (comment) Left Heel   Date First Assessed/Time First Assessed: 08/22/24 0800   Primary Wound Type: Other (comment)  Side: Left  Location: Heel   Wound Image    Dressing Appearance Intact;Dried drainage   Drainage Amount Small   Drainage Characteristics/Odor Serosanguineous;Creamy   Appearance Pink;Red   Tissue loss description Partial thickness   Black (%), Wound Tissue Color 0 %   Red (%), Wound Tissue Color 100 %   Yellow (%), Wound Tissue Color 0 %   Periwound Area Pale white;Pink;Scar tissue   Wound Edges Callused   Wound Length (cm) 3 cm   Wound Width (cm) 2.2 cm   Wound Depth (cm) 0.3 cm   Wound Volume (cm^3) 1.98 cm^3   Wound Surface Area (cm^2) 6.6 cm^2   Care Cleansed with:;Wound cleanser  (vashe)   Dressing Changed;Calcium alginate;Silver;Absorptive Pad;Rolled gauze   Off Loading Off loading shoe        Wound 11/15/24 0800 Pressure Injury Left posterior Greater trochanter   Date First Assessed/Time First Assessed: 11/15/24 0800   Present on Original Admission: No  Primary Wound Type: Pressure Injury  Side: Left  Orientation: posterior  Location: Greater trochanter   Wound Image    Pressure Injury Stage U   Dressing Appearance Open to air   Drainage Amount None   Drainage Characteristics/Odor No odor   Appearance Pink;Red;Black;Dry   Tissue loss description Full thickness   Black (%), Wound Tissue Color 10 %   Red (%), Wound Tissue Color 90 %   Yellow (%), Wound Tissue Color 0 %   Periwound Area Dry;Intact   Wound Edges Defined   Wound Length (cm) 2.5 cm   Wound Width (cm) 4 cm   Wound Depth (cm) 0.1 cm   Wound Volume (cm^3) 1 cm^3   Wound Surface Area (cm^2) 10 cm^2   Care Cleansed with:;Wound cleanser  (vashe)   Dressing Applied;Other (comment)  (mesalt, bordered foam)   Off Loading Other (see comments)  (on envella bed)        Colostomy 11/06/24 LUQ   Placement Date: 11/06/24   Inserted by: MD  Location: LUQ   Wound Image    Stomal Appliance 2 piece;Clean;Dry;Intact;No Leakage   Stoma  Appearance round;rosebud appearance;moist;red   Site Assessment Clean;Intact;Dry   Peristomal Assessment ALDO   Accessories/Skin Care wafer barrier over peristomal skin   Stoma Function stool;bowel sweat   Tolerance no signs/symptoms of discomfort     WOCN follow up for colostomy education and care. Discussed POC w/ nurse. Pt.'s wife at bedside. Explained reason for visit. Pt. Stated hw would be going home early next week with hospice. New colostomy as of 11/6/2024.  Pt.'s stoma within normal limits and producing brown stool. Ostomy supplies at bedside. Ostomy barrier and pouch had been changed this morning.   WOCN follow up for right buttock, sacral wound, Left heel, and right lateral ankle as well. Had assistance from PITO Abarca from wound care team. Continue treatment orders for sacrum/Right buttock: clean w/ vashe, dry well, apply vashe moistened gauze to wound beds, dry gauze, abd pad, secure w/ tape. BID/PRN if soilage. Treatment recommendations for left heel: Clean w/ vashe, dry well, apply Ca+ Ag to red/good tissue wound bed, abd pad, kerlix, secure w/ tape. BID/Prn if soilage. Right lateral ankle: clean w/ vashe, dry well, apply Ca+ Ag cloth to wound bed, gauze or foam. BID/PRN if soilage. Left post upper thigh: Clean w/ vashe, dry well, apply mesalt to wound bed, secure w/ small bordered foam. BID/PRN if soilage.  Nursing to cont. Tx recs and preventative measures. Will follow up next week prior to discharge.     11/22/2024

## 2024-11-22 NOTE — PLAN OF CARE
[facility] accepted patient.  Patient will stay over weekend and possibly be discharged on Monday.  Will continue to follow
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
## 2024-11-22 NOTE — PLAN OF CARE
11/22/24 0954   Discharge Assessment   Assessment Type Discharge Planning Reassessment   Discharge Plan A Hospice/home     Requesting to discharge home with hospice care.  Referral sent to [facility] via [facility] per patient request.  FOC signed via verbal consent due to patient on isolation precautions. Will continue to follow

## 2024-11-23 LAB
POCT GLUCOSE: 111 MG/DL (ref 70–110)
POCT GLUCOSE: 91 MG/DL (ref 70–110)

## 2024-11-23 PROCEDURE — 25000003 PHARM REV CODE 250: Performed by: NURSE PRACTITIONER

## 2024-11-23 PROCEDURE — 11000001 HC ACUTE MED/SURG PRIVATE ROOM

## 2024-11-23 PROCEDURE — 21400001 HC TELEMETRY ROOM

## 2024-11-23 PROCEDURE — 25000003 PHARM REV CODE 250: Performed by: STUDENT IN AN ORGANIZED HEALTH CARE EDUCATION/TRAINING PROGRAM

## 2024-11-23 PROCEDURE — 99900035 HC TECH TIME PER 15 MIN (STAT)

## 2024-11-23 PROCEDURE — 94664 DEMO&/EVAL PT USE INHALER: CPT

## 2024-11-23 PROCEDURE — 25000003 PHARM REV CODE 250: Performed by: INTERNAL MEDICINE

## 2024-11-23 PROCEDURE — 25000003 PHARM REV CODE 250

## 2024-11-23 PROCEDURE — S0179 MEGESTROL 20 MG: HCPCS | Performed by: HOSPITALIST

## 2024-11-23 PROCEDURE — 94799 UNLISTED PULMONARY SVC/PX: CPT

## 2024-11-23 PROCEDURE — A4216 STERILE WATER/SALINE, 10 ML: HCPCS | Performed by: STUDENT IN AN ORGANIZED HEALTH CARE EDUCATION/TRAINING PROGRAM

## 2024-11-23 PROCEDURE — 25000003 PHARM REV CODE 250: Performed by: HOSPITALIST

## 2024-11-23 PROCEDURE — 94760 N-INVAS EAR/PLS OXIMETRY 1: CPT

## 2024-11-23 PROCEDURE — 99900031 HC PATIENT EDUCATION (STAT)

## 2024-11-23 PROCEDURE — 63600175 PHARM REV CODE 636 W HCPCS: Performed by: STUDENT IN AN ORGANIZED HEALTH CARE EDUCATION/TRAINING PROGRAM

## 2024-11-23 PROCEDURE — 27000221 HC OXYGEN, UP TO 24 HOURS

## 2024-11-23 PROCEDURE — 27000207 HC ISOLATION

## 2024-11-23 RX ADMIN — POLYETHYLENE GLYCOL 3350 17 G: 17 POWDER, FOR SOLUTION ORAL at 09:11

## 2024-11-23 RX ADMIN — OXYBUTYNIN CHLORIDE 5 MG: 5 TABLET ORAL at 09:11

## 2024-11-23 RX ADMIN — MORPHINE SULFATE 2 MG: 4 INJECTION, SOLUTION INTRAMUSCULAR; INTRAVENOUS at 09:11

## 2024-11-23 RX ADMIN — FLUOXETINE 20 MG: 20 CAPSULE ORAL at 08:11

## 2024-11-23 RX ADMIN — SODIUM CHLORIDE, PRESERVATIVE FREE 10 ML: 5 INJECTION INTRAVENOUS at 12:11

## 2024-11-23 RX ADMIN — CARVEDILOL 50 MG: 12.5 TABLET, FILM COATED ORAL at 09:11

## 2024-11-23 RX ADMIN — Medication 400 ML: at 06:11

## 2024-11-23 RX ADMIN — HYOSCYAMINE SULFATE 0.12 MG: 0.12 TABLET SUBLINGUAL at 01:11

## 2024-11-23 RX ADMIN — OXYBUTYNIN CHLORIDE 5 MG: 5 TABLET ORAL at 03:11

## 2024-11-23 RX ADMIN — MORPHINE SULFATE 2 MG: 4 INJECTION, SOLUTION INTRAMUSCULAR; INTRAVENOUS at 10:11

## 2024-11-23 RX ADMIN — POLYETHYLENE GLYCOL 3350 17 G: 17 POWDER, FOR SOLUTION ORAL at 08:11

## 2024-11-23 RX ADMIN — DOXYCYCLINE HYCLATE 100 MG: 100 TABLET, COATED ORAL at 09:11

## 2024-11-23 RX ADMIN — OXYCODONE AND ACETAMINOPHEN 1 TABLET: 10; 325 TABLET ORAL at 05:11

## 2024-11-23 RX ADMIN — HYOSCYAMINE SULFATE 0.12 MG: 0.12 TABLET SUBLINGUAL at 05:11

## 2024-11-23 RX ADMIN — OXYCODONE AND ACETAMINOPHEN 1 TABLET: 10; 325 TABLET ORAL at 03:11

## 2024-11-23 RX ADMIN — OXYBUTYNIN CHLORIDE 5 MG: 5 TABLET ORAL at 08:11

## 2024-11-23 RX ADMIN — CARVEDILOL 50 MG: 12.5 TABLET, FILM COATED ORAL at 08:11

## 2024-11-23 RX ADMIN — Medication 400 ML: at 04:11

## 2024-11-23 RX ADMIN — MEGESTROL ACETATE 200 MG: 400 SUSPENSION ORAL at 08:11

## 2024-11-23 RX ADMIN — SODIUM CHLORIDE, PRESERVATIVE FREE 10 ML: 5 INJECTION INTRAVENOUS at 11:11

## 2024-11-23 RX ADMIN — SODIUM CHLORIDE, PRESERVATIVE FREE 10 ML: 5 INJECTION INTRAVENOUS at 06:11

## 2024-11-23 RX ADMIN — GABAPENTIN 400 MG: 400 CAPSULE ORAL at 09:11

## 2024-11-23 RX ADMIN — SENNOSIDES AND DOCUSATE SODIUM 2 TABLET: 50; 8.6 TABLET ORAL at 08:11

## 2024-11-23 RX ADMIN — HYOSCYAMINE SULFATE 0.12 MG: 0.12 TABLET SUBLINGUAL at 10:11

## 2024-11-23 RX ADMIN — TRAZODONE HYDROCHLORIDE 200 MG: 100 TABLET ORAL at 09:11

## 2024-11-23 RX ADMIN — DOXYCYCLINE HYCLATE 100 MG: 100 TABLET, COATED ORAL at 08:11

## 2024-11-23 RX ADMIN — HYOSCYAMINE SULFATE 0.12 MG: 0.12 TABLET SUBLINGUAL at 06:11

## 2024-11-23 RX ADMIN — GABAPENTIN 400 MG: 400 CAPSULE ORAL at 01:11

## 2024-11-23 RX ADMIN — HYDROCHLOROTHIAZIDE 25 MG: 25 TABLET ORAL at 08:11

## 2024-11-23 RX ADMIN — GABAPENTIN 400 MG: 400 CAPSULE ORAL at 06:11

## 2024-11-23 RX ADMIN — METHOCARBAMOL 750 MG: 750 TABLET ORAL at 03:11

## 2024-11-23 RX ADMIN — OXYCODONE AND ACETAMINOPHEN 1 TABLET: 10; 325 TABLET ORAL at 08:11

## 2024-11-23 RX ADMIN — MEGESTROL ACETATE 200 MG: 400 SUSPENSION ORAL at 09:11

## 2024-11-23 RX ADMIN — OXYCODONE AND ACETAMINOPHEN 1 TABLET: 10; 325 TABLET ORAL at 01:11

## 2024-11-23 RX ADMIN — SODIUM CHLORIDE, PRESERVATIVE FREE 10 ML: 5 INJECTION INTRAVENOUS at 05:11

## 2024-11-23 RX ADMIN — PANTOPRAZOLE SODIUM 40 MG: 40 TABLET, DELAYED RELEASE ORAL at 03:11

## 2024-11-23 RX ADMIN — PANTOPRAZOLE SODIUM 40 MG: 40 TABLET, DELAYED RELEASE ORAL at 06:11

## 2024-11-23 RX ADMIN — ATORVASTATIN CALCIUM 20 MG: 10 TABLET, FILM COATED ORAL at 09:11

## 2024-11-23 RX ADMIN — FENOFIBRATE 48 MG: 48 TABLET, FILM COATED ORAL at 08:11

## 2024-11-23 RX ADMIN — NIFEDIPINE 90 MG: 90 TABLET, FILM COATED, EXTENDED RELEASE ORAL at 08:11

## 2024-11-23 RX ADMIN — HYOSCYAMINE SULFATE 0.12 MG: 0.12 TABLET SUBLINGUAL at 09:11
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
## 2024-11-24 LAB — POCT GLUCOSE: 94 MG/DL (ref 70–110)

## 2024-11-24 PROCEDURE — 25000003 PHARM REV CODE 250: Performed by: NURSE PRACTITIONER

## 2024-11-24 PROCEDURE — 25000003 PHARM REV CODE 250

## 2024-11-24 PROCEDURE — 27000207 HC ISOLATION

## 2024-11-24 PROCEDURE — 94799 UNLISTED PULMONARY SVC/PX: CPT

## 2024-11-24 PROCEDURE — 94760 N-INVAS EAR/PLS OXIMETRY 1: CPT

## 2024-11-24 PROCEDURE — S0179 MEGESTROL 20 MG: HCPCS | Performed by: HOSPITALIST

## 2024-11-24 PROCEDURE — 25000003 PHARM REV CODE 250: Performed by: INTERNAL MEDICINE

## 2024-11-24 PROCEDURE — 99900035 HC TECH TIME PER 15 MIN (STAT)

## 2024-11-24 PROCEDURE — 63600175 PHARM REV CODE 636 W HCPCS: Performed by: STUDENT IN AN ORGANIZED HEALTH CARE EDUCATION/TRAINING PROGRAM

## 2024-11-24 PROCEDURE — 25000003 PHARM REV CODE 250: Performed by: HOSPITALIST

## 2024-11-24 PROCEDURE — 27000221 HC OXYGEN, UP TO 24 HOURS

## 2024-11-24 PROCEDURE — 11000001 HC ACUTE MED/SURG PRIVATE ROOM

## 2024-11-24 PROCEDURE — A4216 STERILE WATER/SALINE, 10 ML: HCPCS | Performed by: STUDENT IN AN ORGANIZED HEALTH CARE EDUCATION/TRAINING PROGRAM

## 2024-11-24 PROCEDURE — 25000003 PHARM REV CODE 250: Performed by: STUDENT IN AN ORGANIZED HEALTH CARE EDUCATION/TRAINING PROGRAM

## 2024-11-24 RX ORDER — HYOSCYAMINE SULFATE 0.12 MG/1
0.12 TABLET SUBLINGUAL EVERY 4 HOURS
Qty: 180 TABLET | Refills: 0 | Status: SHIPPED | OUTPATIENT
Start: 2024-11-24 | End: 2024-12-24

## 2024-11-24 RX ORDER — HYDROCHLOROTHIAZIDE 25 MG/1
25 TABLET ORAL DAILY
Qty: 30 TABLET | Refills: 11 | Status: SHIPPED | OUTPATIENT
Start: 2024-11-24 | End: 2025-11-24

## 2024-11-24 RX ORDER — CARVEDILOL 25 MG/1
50 TABLET ORAL 2 TIMES DAILY
Qty: 360 TABLET | Refills: 3 | Status: SHIPPED | OUTPATIENT
Start: 2024-11-24 | End: 2025-11-24

## 2024-11-24 RX ORDER — NIFEDIPINE 90 MG/1
90 TABLET, EXTENDED RELEASE ORAL DAILY
Qty: 30 TABLET | Refills: 11 | Status: SHIPPED | OUTPATIENT
Start: 2024-11-24 | End: 2025-11-24

## 2024-11-24 RX ORDER — MEGESTROL ACETATE 40 MG/ML
200 SUSPENSION ORAL 2 TIMES DAILY
Qty: 300 ML | Refills: 0 | Status: SHIPPED | OUTPATIENT
Start: 2024-11-24 | End: 2024-12-24

## 2024-11-24 RX ORDER — HYDROXYZINE HYDROCHLORIDE 25 MG/1
25 TABLET, FILM COATED ORAL 2 TIMES DAILY PRN
Qty: 90 TABLET | Refills: 0 | Status: SHIPPED | OUTPATIENT
Start: 2024-11-24 | End: 2024-12-24

## 2024-11-24 RX ADMIN — SODIUM CHLORIDE, PRESERVATIVE FREE 10 ML: 5 INJECTION INTRAVENOUS at 11:11

## 2024-11-24 RX ADMIN — OXYCODONE AND ACETAMINOPHEN 1 TABLET: 10; 325 TABLET ORAL at 09:11

## 2024-11-24 RX ADMIN — SODIUM CHLORIDE, PRESERVATIVE FREE 10 ML: 5 INJECTION INTRAVENOUS at 05:11

## 2024-11-24 RX ADMIN — SENNOSIDES AND DOCUSATE SODIUM 2 TABLET: 50; 8.6 TABLET ORAL at 08:11

## 2024-11-24 RX ADMIN — DOXYCYCLINE HYCLATE 100 MG: 100 TABLET, COATED ORAL at 08:11

## 2024-11-24 RX ADMIN — OXYCODONE AND ACETAMINOPHEN 1 TABLET: 10; 325 TABLET ORAL at 05:11

## 2024-11-24 RX ADMIN — HYOSCYAMINE SULFATE 0.12 MG: 0.12 TABLET SUBLINGUAL at 09:11

## 2024-11-24 RX ADMIN — SENNOSIDES AND DOCUSATE SODIUM 2 TABLET: 50; 8.6 TABLET ORAL at 09:11

## 2024-11-24 RX ADMIN — HYDROCHLOROTHIAZIDE 25 MG: 25 TABLET ORAL at 09:11

## 2024-11-24 RX ADMIN — HYOSCYAMINE SULFATE 0.12 MG: 0.12 TABLET SUBLINGUAL at 01:11

## 2024-11-24 RX ADMIN — PANTOPRAZOLE SODIUM 40 MG: 40 TABLET, DELAYED RELEASE ORAL at 06:11

## 2024-11-24 RX ADMIN — METHOCARBAMOL 750 MG: 750 TABLET ORAL at 11:11

## 2024-11-24 RX ADMIN — OXYBUTYNIN CHLORIDE 5 MG: 5 TABLET ORAL at 09:11

## 2024-11-24 RX ADMIN — POLYETHYLENE GLYCOL 3350 17 G: 17 POWDER, FOR SOLUTION ORAL at 08:11

## 2024-11-24 RX ADMIN — OXYCODONE AND ACETAMINOPHEN 1 TABLET: 10; 325 TABLET ORAL at 02:11

## 2024-11-24 RX ADMIN — ATORVASTATIN CALCIUM 20 MG: 10 TABLET, FILM COATED ORAL at 08:11

## 2024-11-24 RX ADMIN — OXYBUTYNIN CHLORIDE 5 MG: 5 TABLET ORAL at 08:11

## 2024-11-24 RX ADMIN — MORPHINE SULFATE 2 MG: 4 INJECTION, SOLUTION INTRAMUSCULAR; INTRAVENOUS at 11:11

## 2024-11-24 RX ADMIN — MEGESTROL ACETATE 200 MG: 400 SUSPENSION ORAL at 09:11

## 2024-11-24 RX ADMIN — MEGESTROL ACETATE 200 MG: 400 SUSPENSION ORAL at 08:11

## 2024-11-24 RX ADMIN — OXYBUTYNIN CHLORIDE 5 MG: 5 TABLET ORAL at 02:11

## 2024-11-24 RX ADMIN — GABAPENTIN 400 MG: 400 CAPSULE ORAL at 08:11

## 2024-11-24 RX ADMIN — CARVEDILOL 50 MG: 12.5 TABLET, FILM COATED ORAL at 08:11

## 2024-11-24 RX ADMIN — HYOSCYAMINE SULFATE 0.12 MG: 0.12 TABLET SUBLINGUAL at 05:11

## 2024-11-24 RX ADMIN — CARVEDILOL 50 MG: 12.5 TABLET, FILM COATED ORAL at 09:11

## 2024-11-24 RX ADMIN — Medication 400 ML: at 03:11

## 2024-11-24 RX ADMIN — POLYETHYLENE GLYCOL 3350 17 G: 17 POWDER, FOR SOLUTION ORAL at 09:11

## 2024-11-24 RX ADMIN — HYOSCYAMINE SULFATE 0.12 MG: 0.12 TABLET SUBLINGUAL at 08:11

## 2024-11-24 RX ADMIN — FLUOXETINE 20 MG: 20 CAPSULE ORAL at 09:11

## 2024-11-24 RX ADMIN — NIFEDIPINE 90 MG: 90 TABLET, FILM COATED, EXTENDED RELEASE ORAL at 09:11

## 2024-11-24 RX ADMIN — OXYCODONE AND ACETAMINOPHEN 1 TABLET: 10; 325 TABLET ORAL at 06:11

## 2024-11-24 RX ADMIN — TRAZODONE HYDROCHLORIDE 200 MG: 100 TABLET ORAL at 08:11

## 2024-11-24 RX ADMIN — OXYCODONE AND ACETAMINOPHEN 1 TABLET: 10; 325 TABLET ORAL at 11:11

## 2024-11-24 RX ADMIN — DOXYCYCLINE HYCLATE 100 MG: 100 TABLET, COATED ORAL at 09:11

## 2024-11-24 RX ADMIN — PANTOPRAZOLE SODIUM 40 MG: 40 TABLET, DELAYED RELEASE ORAL at 03:11

## 2024-11-24 RX ADMIN — GABAPENTIN 400 MG: 400 CAPSULE ORAL at 01:11

## 2024-11-24 RX ADMIN — GABAPENTIN 400 MG: 400 CAPSULE ORAL at 05:11

## 2024-11-24 RX ADMIN — FENOFIBRATE 48 MG: 48 TABLET, FILM COATED ORAL at 09:11

## 2024-11-24 RX ADMIN — Medication 400 ML: at 06:11

## 2024-11-25 PROCEDURE — A4216 STERILE WATER/SALINE, 10 ML: HCPCS | Performed by: STUDENT IN AN ORGANIZED HEALTH CARE EDUCATION/TRAINING PROGRAM

## 2024-11-25 PROCEDURE — 99233 SBSQ HOSP IP/OBS HIGH 50: CPT | Mod: ,,,

## 2024-11-25 PROCEDURE — 25000003 PHARM REV CODE 250

## 2024-11-25 PROCEDURE — 63600175 PHARM REV CODE 636 W HCPCS

## 2024-11-25 PROCEDURE — 63600175 PHARM REV CODE 636 W HCPCS: Performed by: STUDENT IN AN ORGANIZED HEALTH CARE EDUCATION/TRAINING PROGRAM

## 2024-11-25 PROCEDURE — 25000003 PHARM REV CODE 250: Performed by: NURSE PRACTITIONER

## 2024-11-25 PROCEDURE — 11000001 HC ACUTE MED/SURG PRIVATE ROOM

## 2024-11-25 PROCEDURE — 99900031 HC PATIENT EDUCATION (STAT)

## 2024-11-25 PROCEDURE — 99900035 HC TECH TIME PER 15 MIN (STAT)

## 2024-11-25 PROCEDURE — 05PYX3Z REMOVAL OF INFUSION DEVICE FROM UPPER VEIN, EXTERNAL APPROACH: ICD-10-PCS | Performed by: STUDENT IN AN ORGANIZED HEALTH CARE EDUCATION/TRAINING PROGRAM

## 2024-11-25 PROCEDURE — S0179 MEGESTROL 20 MG: HCPCS | Performed by: HOSPITALIST

## 2024-11-25 PROCEDURE — 25000003 PHARM REV CODE 250: Performed by: INTERNAL MEDICINE

## 2024-11-25 PROCEDURE — 25000003 PHARM REV CODE 250: Performed by: STUDENT IN AN ORGANIZED HEALTH CARE EDUCATION/TRAINING PROGRAM

## 2024-11-25 PROCEDURE — 27000207 HC ISOLATION

## 2024-11-25 PROCEDURE — 25000003 PHARM REV CODE 250: Performed by: HOSPITALIST

## 2024-11-25 PROCEDURE — 94799 UNLISTED PULMONARY SVC/PX: CPT

## 2024-11-25 RX ORDER — LIDOCAINE HYDROCHLORIDE 10 MG/ML
INJECTION, SOLUTION INFILTRATION; PERINEURAL
Status: COMPLETED
Start: 2024-11-25 | End: 2024-11-25

## 2024-11-25 RX ORDER — LIDOCAINE HYDROCHLORIDE 10 MG/ML
10 INJECTION, SOLUTION INFILTRATION; PERINEURAL ONCE
Status: DISCONTINUED | OUTPATIENT
Start: 2024-11-25 | End: 2024-11-26 | Stop reason: HOSPADM

## 2024-11-25 RX ADMIN — TRAZODONE HYDROCHLORIDE 200 MG: 100 TABLET ORAL at 08:11

## 2024-11-25 RX ADMIN — SODIUM CHLORIDE, PRESERVATIVE FREE 10 ML: 5 INJECTION INTRAVENOUS at 05:11

## 2024-11-25 RX ADMIN — POLYETHYLENE GLYCOL 3350 17 G: 17 POWDER, FOR SOLUTION ORAL at 08:11

## 2024-11-25 RX ADMIN — OXYBUTYNIN CHLORIDE 5 MG: 5 TABLET ORAL at 08:11

## 2024-11-25 RX ADMIN — SENNOSIDES AND DOCUSATE SODIUM 2 TABLET: 50; 8.6 TABLET ORAL at 08:11

## 2024-11-25 RX ADMIN — NIFEDIPINE 90 MG: 90 TABLET, FILM COATED, EXTENDED RELEASE ORAL at 09:11

## 2024-11-25 RX ADMIN — HYOSCYAMINE SULFATE 0.12 MG: 0.12 TABLET SUBLINGUAL at 09:11

## 2024-11-25 RX ADMIN — FENOFIBRATE 48 MG: 48 TABLET, FILM COATED ORAL at 09:11

## 2024-11-25 RX ADMIN — OXYCODONE AND ACETAMINOPHEN 1 TABLET: 10; 325 TABLET ORAL at 03:11

## 2024-11-25 RX ADMIN — OXYBUTYNIN CHLORIDE 5 MG: 5 TABLET ORAL at 09:11

## 2024-11-25 RX ADMIN — OXYBUTYNIN CHLORIDE 5 MG: 5 TABLET ORAL at 04:11

## 2024-11-25 RX ADMIN — LIDOCAINE HYDROCHLORIDE: 10 INJECTION, SOLUTION INFILTRATION; PERINEURAL at 11:11

## 2024-11-25 RX ADMIN — GABAPENTIN 400 MG: 400 CAPSULE ORAL at 05:11

## 2024-11-25 RX ADMIN — HYOSCYAMINE SULFATE 0.12 MG: 0.12 TABLET SUBLINGUAL at 05:11

## 2024-11-25 RX ADMIN — DOXYCYCLINE HYCLATE 100 MG: 100 TABLET, COATED ORAL at 08:11

## 2024-11-25 RX ADMIN — OXYCODONE AND ACETAMINOPHEN 1 TABLET: 10; 325 TABLET ORAL at 04:11

## 2024-11-25 RX ADMIN — HYOSCYAMINE SULFATE 0.12 MG: 0.12 TABLET SUBLINGUAL at 01:11

## 2024-11-25 RX ADMIN — DOXYCYCLINE HYCLATE 100 MG: 100 TABLET, COATED ORAL at 09:11

## 2024-11-25 RX ADMIN — CARVEDILOL 50 MG: 12.5 TABLET, FILM COATED ORAL at 08:11

## 2024-11-25 RX ADMIN — PANTOPRAZOLE SODIUM 40 MG: 40 TABLET, DELAYED RELEASE ORAL at 05:11

## 2024-11-25 RX ADMIN — FLUOXETINE 20 MG: 20 CAPSULE ORAL at 09:11

## 2024-11-25 RX ADMIN — PANTOPRAZOLE SODIUM 40 MG: 40 TABLET, DELAYED RELEASE ORAL at 07:11

## 2024-11-25 RX ADMIN — SENNOSIDES AND DOCUSATE SODIUM 2 TABLET: 50; 8.6 TABLET ORAL at 09:11

## 2024-11-25 RX ADMIN — MORPHINE SULFATE 2 MG: 4 INJECTION, SOLUTION INTRAMUSCULAR; INTRAVENOUS at 09:11

## 2024-11-25 RX ADMIN — HYOSCYAMINE SULFATE 0.12 MG: 0.12 TABLET SUBLINGUAL at 07:11

## 2024-11-25 RX ADMIN — POLYETHYLENE GLYCOL 3350 17 G: 17 POWDER, FOR SOLUTION ORAL at 09:11

## 2024-11-25 RX ADMIN — OXYCODONE AND ACETAMINOPHEN 1 TABLET: 10; 325 TABLET ORAL at 08:11

## 2024-11-25 RX ADMIN — MEGESTROL ACETATE 200 MG: 400 SUSPENSION ORAL at 09:11

## 2024-11-25 RX ADMIN — GABAPENTIN 400 MG: 400 CAPSULE ORAL at 04:11

## 2024-11-25 RX ADMIN — Medication 400 ML: at 05:11

## 2024-11-25 RX ADMIN — ATORVASTATIN CALCIUM 20 MG: 10 TABLET, FILM COATED ORAL at 08:11

## 2024-11-25 RX ADMIN — MEGESTROL ACETATE 200 MG: 400 SUSPENSION ORAL at 08:11

## 2024-11-25 RX ADMIN — HYDROCHLOROTHIAZIDE 25 MG: 25 TABLET ORAL at 09:11

## 2024-11-25 RX ADMIN — CARVEDILOL 50 MG: 12.5 TABLET, FILM COATED ORAL at 09:11

## 2024-11-25 NOTE — CARE UPDATE
[id number] Spoke with Lucia with [facility] and she informed me the pt is asking for a sand mattress. She is not sure she is able to accommodate that type of mattress. She is checking on it. Lucia asked that I contact her once the surgeon takes out the IJ. She will [facility] for pt.   Pt is aware he will be dc today with hospice.

## 2024-11-25 NOTE — PROCEDURES
"Bianca Khan is a 59 y.o. male patient.    Temp: 97.5 °F (36.4 °C) (11/25/24 1130)  Pulse: 85 (11/25/24 1130)  Resp: 18 (11/25/24 1130)  BP: 110/70 (11/25/24 1130)  SpO2: 97 % (11/25/24 1130)  Weight: 79.3 kg (174 lb 13.2 oz) (10/17/24 1212)  Height: 5' 10" (177.8 cm) (10/09/24 0430)       Tunneled Catheter Removal    Date/Time: 11/25/2024 12:07 PM  Location procedure was performed: [facility] GENERAL SURGERY    Performed by: Anita Morse MD  Authorized by: Anita Morse MD  Assisting provider: Yaya Oconnell MD  Body area: head/neck  Location details: neck  Description of findings: Removal of Right Internal Jugular Tunneled Catheter.   Wound Appearance: clean, no drainage, normal color, nontender and nonpurulent  Sutures Removed: 2  Staples Removed: 0  Post-removal: dressing applied  Complications: No  Estimated blood loss (mL): 0  Specimens: No  Implants: No  Patient tolerance: Patient tolerated the procedure well with no immediate complications  Comments: Upon inspection, removed catheter was intact with no evidence of fracture.        Anita Morse MD   [facility] General Surgery PGY-1     11/25/2024    "
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
## 2024-11-25 NOTE — CARE UPDATE
[id number] [facility] and [facility] transport since Lucia with [facility] was having difficulty with her email. Ambulance will be here within the hour

## 2024-11-25 NOTE — CARE UPDATE
[id number] Pt's nurse informed me pt's IJ is out. Notified Lucia with [facility] and she will set up ambulance transport.
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
## 2024-11-25 NOTE — PROGRESS NOTES
[facility] - 8th Floor Med Surg  Wound Care    Patient Name:  Bianca Khan   MRN:  [id number]  Date: 2024  Diagnosis: Pressure ulcer of sacral region, stage 4    History:     Past Medical History:   Diagnosis Date    Arthritis     Chronic ulcer of ankle 2022    ESBL (extended spectrum beta-lactamase) producing bacteria infection 2024    Frequent UTI 2019    Generalized anxiety disorder 2022    Neurogenic bladder 2022    Osteomyelitis 2022    Paraplegia     Presence of suprapubic catheter 2022    Pure hypercholesterolemia 2022    Retention of urine, unspecified 2019    Spinal cord injury at T1-T6 level 2018       Social History     Socioeconomic History    Marital status:    Tobacco Use    Smoking status: Some Days     Current packs/day: 0.00     Average packs/day: 0.2 packs/day for 41.5 years (10.4 ttl pk-yrs)     Types: Cigars, Cigarettes     Start date: 1982     Last attempt to quit: 2023     Years since quittin.3    Smokeless tobacco: Never   Substance and Sexual Activity    Alcohol use: Not Currently     Alcohol/week: 2.0 standard drinks of alcohol     Types: 2 Cans of beer per week    Drug use: Not Currently     Types: Oxycodone    Sexual activity: Not Currently     Partners: Female     Birth control/protection: None     Social Drivers of Health     Financial Resource Strain: Low Risk  (10/10/2024)    Overall Financial Resource Strain (CARDIA)     Difficulty of Paying Living Expenses: Not hard at all   Food Insecurity: No Food Insecurity (10/10/2024)    Hunger Vital Sign     Worried About Running Out of Food in the Last Year: Never true     Ran Out of Food in the Last Year: Never true   Transportation Needs: No Transportation Needs (10/10/2024)    TRANSPORTATION NEEDS     Transportation : No   Physical Activity: Inactive (2023)    Exercise Vital Sign     Days of Exercise per Week: 0 days     Minutes of  Exercise per Session: 0 min   Stress: No Stress Concern Present (10/10/2024)    [employer] - Occupational Stress Questionnaire     Feeling of Stress : Only a little   Housing Stability: Low Risk  (10/10/2024)    Housing Stability Vital Sign     Unable to Pay for Housing in the Last Year: No     Homeless in the Last Year: No       Precautions:     Allergies as of 10/08/2024 - Reviewed 10/08/2024   Allergen Reaction Noted    Baclofen Itching and Anxiety 06/17/2019       WO Assessment Details/Treatment        11/25/24 1018   WOCN Assessment   Visit Date 11/25/24   Visit Time 1018   Consult Type Follow Up   WOCN Speciality Ostomy;Wound   WOCN List colostomy   Wound surgical   Continence Type Fecal   Ostomy Type Colostomy   Procedure ostomy pouch   Intervention chart review;assessed;changed   Teaching on-going        Altered Skin Integrity 06/28/23 2230 Right lateral Ankle #6   Date First Assessed/Time First Assessed: 06/28/23 2230   Altered Skin Integrity Present on Admission - Did Patient arrive to the hospital with altered skin?: yes  Side: Right  Orientation: lateral  Location: Ankle  Wound Number: #6  Is this injury dev...   Wound Image    Description of Altered Skin Integrity Partial thickness tissue loss. Shallow open ulcer with a red or pink wound bed, without slough. Intact or Open/Ruptured Serum-filled blister.   Dressing Appearance Dry;Intact;Clean   Drainage Amount Small   Drainage Characteristics/Odor Creamy;No odor   Appearance Pink;Red   Tissue loss description Partial thickness   Black (%), Wound Tissue Color 0 %   Red (%), Wound Tissue Color 100 %   Yellow (%), Wound Tissue Color 0 %   Periwound Area Pale white;Scar tissue   Wound Edges Callused   Wound Length (cm) 1 cm   Wound Width (cm) 0.8 cm   Wound Depth (cm) 0.1 cm   Wound Volume (cm^3) 0.08 cm^3   Wound Surface Area (cm^2) 0.8 cm^2        Altered Skin Integrity 01/09/24 0848 upper Sacral spine   Date First  Assessed/Time First Assessed: 01/09/24 0848   Altered Skin Integrity Present on Admission - Did Patient arrive to the hospital with altered skin?: suspected hospital acquired  Orientation: upper  Location: Sacral spine   Wound Image    Description of Altered Skin Integrity Full thickness tissue loss with exposed bone, tendon, or muscle. Often includes undermining and tunneling. May extend into muscle and/or supporting structures.   Dressing Appearance Moist drainage   Drainage Amount Moderate   Drainage Characteristics/Odor Serosanguineous   Appearance Pink;Red;Yellow   Tissue loss description Full thickness   Black (%), Wound Tissue Color 0 %   Red (%), Wound Tissue Color 90 %   Yellow (%), Wound Tissue Color 10 %   Periwound Area Pale white;Dry   Wound Edges Defined   Wound Length (cm) 5.3 cm   Wound Width (cm) 5.5 cm   Wound Depth (cm) 1.7 cm   Wound Volume (cm^3) 49.555 cm^3   Wound Surface Area (cm^2) 29.15 cm^2   Undermining (depth (cm)/location) 11-2 oclock @0.8 and 5-6 oclock @ 0.8cm   Care Cleansed with:;Wound cleanser   Dressing Changed;Gauze, wet to dry;Absorptive Pad   Off Loading Other (see comments)  (On sand bed)        Wound 05/30/24 0000 Pressure Injury Right Buttocks   Date First Assessed/Time First Assessed: 05/30/24 0000   Primary Wound Type: Pressure Injury  Side: Right  Location: Buttocks  Is this injury device related?: No   Wound Image    Pressure Injury Stage 4   Dressing Appearance Moist drainage   Drainage Amount Moderate   Drainage Characteristics/Odor Creamy;Serosanguineous   Appearance Pink;Yellow;Red   Tissue loss description Full thickness   Black (%), Wound Tissue Color 0 %   Red (%), Wound Tissue Color 80 %   Yellow (%), Wound Tissue Color 20 %   Periwound Area Pink;Pale white   Wound Edges Defined   Wound Length (cm) 4 cm   Wound Width (cm) 6 cm   Wound Depth (cm) 3 cm   Wound Volume (cm^3) 72 cm^3   Wound Surface Area (cm^2) 24 cm^2   Undermining (depth (cm)/location) um @1oclock  @3.3 cm   Care Cleansed with:;Wound cleanser   Dressing Changed;Gauze, wet to dry;Absorptive Pad        Wound 08/22/24 0800 Other (comment) Left Heel   Date First Assessed/Time First Assessed: 08/22/24 0800   Primary Wound Type: Other (comment)  Side: Left  Location: Heel   Wound Image    Dressing Appearance Intact   Drainage Amount Small   Drainage Characteristics/Odor Serosanguineous;Creamy   Appearance Pink;Red   Tissue loss description Partial thickness   Black (%), Wound Tissue Color 0 %   Red (%), Wound Tissue Color 100 %   Yellow (%), Wound Tissue Color 0 %   Periwound Area Pale white;Pink;Dry   Wound Edges Callused   Wound Length (cm) 2 cm   Wound Width (cm) 2 cm   Wound Depth (cm) 0.2 cm   Wound Volume (cm^3) 0.8 cm^3   Wound Surface Area (cm^2) 4 cm^2   Undermining (depth (cm)/location) 5-7 @1cm   Care Cleansed with:;Wound cleanser   Dressing Changed;Calcium alginate;Silver;Absorptive Pad;Rolled gauze   Off Loading Off loading shoe        Wound 11/15/24 0800 Pressure Injury Left posterior Greater trochanter   Date First Assessed/Time First Assessed: 11/15/24 0800   Present on Original Admission: No  Primary Wound Type: Pressure Injury  Side: Left  Orientation: posterior  Location: Greater trochanter   Wound Image    Pressure Injury Stage U   Dressing Appearance Open to air   Drainage Amount Scant   Drainage Characteristics/Odor Serosanguineous   Appearance Pink;Red;Yellow;Tan   Tissue loss description Full thickness   Black (%), Wound Tissue Color 0 %   Red (%), Wound Tissue Color 90 %   Yellow (%), Wound Tissue Color 10 %   Periwound Area Dry;Intact;Pink   Wound Edges Defined   Wound Length (cm) 3.6 cm   Wound Width (cm) 2.7 cm   Wound Depth (cm) 0.1 cm   Wound Volume (cm^3) 0.972 cm^3   Wound Surface Area (cm^2) 9.72 cm^2   Care Cleansed with:;Wound cleanser   Dressing Applied;Other (comment)  (dry mesalt and foam)        Colostomy 11/06/24 LUQ   Placement Date: 11/06/24   Inserted by: MD  Location: LUQ    Stomal Appliance 2 piece;Clean;Dry;Intact;No Leakage   Stoma Appearance round;rosebud appearance;red;moist   Site Assessment Clean;Intact   Peristomal Assessment ALDO   Accessories/Skin Care wafer barrier over peristomal skin   Stoma Function stool   Tolerance no signs/symptoms of discomfort     WOCN follow up for colostomy education and care. Discussed POC w/ nurse. Pt.'s wife and sister at bedside. Explained reason for visit. Pt. Stated he would be going home today with hospice. New colostomy as of 11/6/2024.  Pt.'s stoma within normal limits and producing brown stool. Ostomy supplies ordered to bedside for discharge. Secure start set up.   WOCN follow up for right buttock, sacral wound, Left heel, and right lateral ankle as well. Had assistance from Jaime RN and PITO Abarca from wound care team. Continue treatment orders for sacrum/Right buttock: clean w/ vashe, dry well, apply vashe moistened gauze to wound beds, dry gauze, abd pad, secure w/ tape. BID/PRN if soilage. Treatment recommendations for left heel: Clean w/ vashe, dry well, apply Ca+ Ag to red/good tissue wound bed, abd pad, kerlix, secure w/ tape. BID/Prn if soilage. Right lateral ankle: clean w/ vashe, dry well, apply Ca+ Ag cloth to wound bed, gauze or foam. BID/PRN if soilage. Left post upper thigh: Clean w/ vashe, dry well, apply mesalt to wound bed, secure w/ small bordered foam. BID/PRN if soilage.  Nursing to cont. Tx recs and preventative measures. Will follow up if applicable.      11/25/2024
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
## 2024-11-25 NOTE — PLAN OF CARE
11/25/24 1552   Final Note   Assessment Type Discharge Planning Assessment   Anticipated Discharge Disposition [facility] Resources/Appts/Education Provided Appointments scheduled and added to AVS   Post-Acute Status   Post-Acute Authorization Hospice   Hospice Status Set-up Complete/Auth obtained   Discharge Delays None known at this time     Pt will dc home with [facility]

## 2024-11-26 VITALS
BODY MASS INDEX: 25.03 KG/M2 | RESPIRATION RATE: 17 BRPM | SYSTOLIC BLOOD PRESSURE: 150 MMHG | TEMPERATURE: 98 F | DIASTOLIC BLOOD PRESSURE: 88 MMHG | OXYGEN SATURATION: 99 % | HEART RATE: 75 BPM | HEIGHT: 70 IN | WEIGHT: 174.81 LBS

## 2024-11-26 PROCEDURE — S0179 MEGESTROL 20 MG: HCPCS | Performed by: HOSPITALIST

## 2024-11-26 PROCEDURE — 25000003 PHARM REV CODE 250: Performed by: NURSE PRACTITIONER

## 2024-11-26 PROCEDURE — 25000003 PHARM REV CODE 250: Performed by: INTERNAL MEDICINE

## 2024-11-26 PROCEDURE — 25000003 PHARM REV CODE 250

## 2024-11-26 PROCEDURE — 25000003 PHARM REV CODE 250: Performed by: HOSPITALIST

## 2024-11-26 PROCEDURE — 25000003 PHARM REV CODE 250: Performed by: STUDENT IN AN ORGANIZED HEALTH CARE EDUCATION/TRAINING PROGRAM

## 2024-11-26 RX ADMIN — METHOCARBAMOL 750 MG: 750 TABLET ORAL at 04:11

## 2024-11-26 RX ADMIN — HYDROCHLOROTHIAZIDE 25 MG: 25 TABLET ORAL at 09:11

## 2024-11-26 RX ADMIN — HYOSCYAMINE SULFATE 0.12 MG: 0.12 TABLET SUBLINGUAL at 12:11

## 2024-11-26 RX ADMIN — DOXYCYCLINE HYCLATE 100 MG: 100 TABLET, COATED ORAL at 09:11

## 2024-11-26 RX ADMIN — HYOSCYAMINE SULFATE 0.12 MG: 0.12 TABLET SUBLINGUAL at 04:11

## 2024-11-26 RX ADMIN — OXYBUTYNIN CHLORIDE 5 MG: 5 TABLET ORAL at 09:11

## 2024-11-26 RX ADMIN — POLYETHYLENE GLYCOL 3350 17 G: 17 POWDER, FOR SOLUTION ORAL at 09:11

## 2024-11-26 RX ADMIN — Medication 400 ML: at 04:11

## 2024-11-26 RX ADMIN — FENOFIBRATE 48 MG: 48 TABLET, FILM COATED ORAL at 09:11

## 2024-11-26 RX ADMIN — HYOSCYAMINE SULFATE 0.12 MG: 0.12 TABLET SUBLINGUAL at 09:11

## 2024-11-26 RX ADMIN — OXYCODONE AND ACETAMINOPHEN 1 TABLET: 10; 325 TABLET ORAL at 12:11

## 2024-11-26 RX ADMIN — SENNOSIDES AND DOCUSATE SODIUM 2 TABLET: 50; 8.6 TABLET ORAL at 09:11

## 2024-11-26 RX ADMIN — OXYCODONE AND ACETAMINOPHEN 1 TABLET: 10; 325 TABLET ORAL at 04:11

## 2024-11-26 RX ADMIN — NIFEDIPINE 90 MG: 90 TABLET, FILM COATED, EXTENDED RELEASE ORAL at 09:11

## 2024-11-26 RX ADMIN — MEGESTROL ACETATE 200 MG: 400 SUSPENSION ORAL at 09:11

## 2024-11-26 RX ADMIN — CARVEDILOL 50 MG: 12.5 TABLET, FILM COATED ORAL at 09:11

## 2024-11-26 RX ADMIN — OXYCODONE AND ACETAMINOPHEN 1 TABLET: 10; 325 TABLET ORAL at 09:11

## 2024-11-26 RX ADMIN — GABAPENTIN 400 MG: 400 CAPSULE ORAL at 04:11

## 2024-11-26 RX ADMIN — PANTOPRAZOLE SODIUM 40 MG: 40 TABLET, DELAYED RELEASE ORAL at 04:11

## 2024-11-26 RX ADMIN — FLUOXETINE 20 MG: 20 CAPSULE ORAL at 09:11
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
## 2024-11-26 NOTE — PROGRESS NOTES
Infectious Diseases Progress Note  58 y/o male with past medical history of T-spine cord injury with paraplegia, diabetes, HTN, hepatitis-C, chronic MRSA L ankle wound/osteomyelitis, was on suppressive doxycycline and R knee infection/septic arthritis and osteomyelitis with GBS/Enterococcus and Ecoli isolates who presented to ER on 10/8 with increased generalized pain, increased foul smelling drainage from sacral wound with fever and chills. He was noted to be hypotensive per EMS. On admit he was afebrile without leukocytosis. ESR 98 and .80. MRSA PCR (-). U/A with 21-50 WBC, 0-5 RBC, 75 LE, no bacteria, negative nitrites. Urine culture negative. Blood cultures negative. Sacral wound culture with many Acinetobacter, many ESBL Ecoli, moderate Enterococcus, Bacteroides and Peptoniphilus. CT pelvis with contrast showed worsening inflammatory change of the sacral decubitus ulcers with gas now identified inferior to the right pubic ramus, stool noted throughout the colon as may be seen with constipation, pyelonephritis is not excluded. Seen by Surgery and underwent debridement of sacral wound. During his stay he was noted to have large amounts of bleeding from sacral wound. CT abdomen/pelvis on 10/17 showed interval development of L renal rupture with large subcapsular hematoma, L retroperitoneal hemorrhage. He received multiple blood/blood products over the past couple days. He was noted to be hypotensive and was transferred to ICU on 10/17. He was seen by Vascular and underwent Aortogram,  left renal angiography with coil embolization of left renal artery and right groin central venous line insertion on 10/17. Sputum culture on 108/23 revealing Pseudomonas. RUE venous ultrasound positive for deep  vein thrombosis in brachial and radial veins of the right upper extremity. Sputum culture with MDR Pseudomonas. S/P colostomy placement on 11/6.  He is currently on chronic oral suppression with  Doxycycline    Subjective:  Sleeping in bed, no acute distress. Currently on 1L NC. Afebrile.     Review of Systems   Constitutional:  Positive for malaise/fatigue.   HENT: Negative.     Eyes: Negative.    Respiratory:  Positive for shortness of breath.    Cardiovascular: Negative.    Gastrointestinal: Negative.    Musculoskeletal: Negative.    Skin: Negative.    Neurological:  Positive for focal weakness and weakness.   All other systems reviewed and are negative.      Review of patient's allergies indicates:   Allergen Reactions    Baclofen Itching and Anxiety       Past Medical History:   Diagnosis Date    Arthritis     Chronic ulcer of ankle 05/26/2022    ESBL (extended spectrum beta-lactamase) producing bacteria infection 08/30/2024    Frequent UTI 07/02/2019    Generalized anxiety disorder 05/26/2022    Neurogenic bladder 05/26/2022    Osteomyelitis 05/26/2022    Paraplegia     Presence of suprapubic catheter 05/26/2022    Pure hypercholesterolemia 05/26/2022    Retention of urine, unspecified 08/09/2019    Spinal cord injury at T1-T6 level 04/20/2018       Past Surgical History:   Procedure Laterality Date    ANGIOGRAM, RENAL ARTERIES, BILATERAL N/A 10/17/2024    Procedure: Angiogram, Renal Arteries, Bilateral;  Surgeon: Pati King MD;  Location: [facility] CATH LAB;  Service: Peripheral Vascular;  Laterality: N/A;  left renal artery coiling    APPLICATION OF WOUND VACUUM-ASSISTED CLOSURE DEVICE N/A 10/10/2024    Procedure: APPLICATION, WOUND VAC;  Surgeon: Rob Sinclair MD;  Location: [facility] OR;  Service: General;  Laterality: N/A;    COLONOSCOPY N/A 11/13/2024    Procedure: COLON;  Surgeon: Thanh Olson MD;  Location: [facility] ENDOSCOPY;  Service: Gastroenterology;  Laterality: N/A;  though ostomy    CREATION, COLOSTOMY, LAPAROSCOPIC N/A 11/6/2024    Procedure: CREATION, COLOSTOMY, LAPAROSCOPIC CONVERTED  TO OPEN;  Surgeon: Rob Sinclair MD;  Location: [facility] OR;  Service: General;  Laterality:  N/A;    EGD, WITH CLOSED BIOPSY N/A 8/29/2024    Procedure: EGD, WITH CLOSED BIOPSY;  Surgeon: Mikal Segovia MD;  Location: [facility] ENDOSCOPY;  Service: Gastroenterology;  Laterality: N/A;    ESOPHAGOGASTRODUODENOSCOPY N/A 8/29/2024    Procedure: EGD;  Surgeon: Mikal Segovia MD;  Location: [facility] ENDOSCOPY;  Service: Gastroenterology;  Laterality: N/A;    FRACTURE SURGERY  2021    INCISION AND DRAINAGE, LOWER EXTREMITY Right 06/29/2023    Procedure: INCISION AND DRAINAGE, LOWER EXTREMITY;  Surgeon: Prabhu Shen DO;  Location: [facility] OR;  Service: Orthopedics;  Laterality: Right;  supine bone foam wash stuff cultures    INCISION AND DRAINAGE, LOWER EXTREMITY Right 11/30/2023    Procedure: INCISION AND DRAINAGE, LOWER EXTREMITY;  Surgeon: Prabhu Shen DO;  Location: [facility];  Service: Orthopedics;  Laterality: Right;  supine any table bone foam wash stuff possible wound vac    INCISION AND DRAINAGE, LOWER EXTREMITY Right 12/1/2023    Procedure: INCISION AND DRAINAGE, LOWER EXTREMITY;  Surgeon: Prabhu Shen DO;  Location: [facility];  Service: Orthopedics;  Laterality: Right;  supine bone foam vascular bed removal of distal femoral plate, wash stuff, possible AKA    INSERTION OF INTRAMEDULLARY MARISA Right 08/10/2022    Procedure: INSERTION, INTRAMEDULLARY MARISA RIGHT TIBIA;  Surgeon: Jorge Orellana MD;  Location: [facility];  Service: Orthopedics;  Laterality: Right;    JOINT REPLACEMENT  2021    Ankel    PRESSURE ULCER DEBRIDEMENT N/A 10/10/2024    Procedure: DEBRIDEMENT, PRESSURE ULCER;  Surgeon: Rob Sinclair MD;  Location: [facility];  Service: General;  Laterality: N/A;  sacral wound, R buttock wound    REMOVAL OF HARDWARE FROM LOWER EXTREMITY Right 12/1/2023    Procedure: REMOVAL, HARDWARE, LOWER EXTREMITY;  Surgeon: Prabhu Shen DO;  Location: [facility];  Service: Orthopedics;  Laterality: Right;    SPINE SURGERY  March 1st 2018       Social History     Socioeconomic History    Marital status:     Tobacco Use    Smoking status: Some Days     Current packs/day: 0.00     Average packs/day: 0.2 packs/day for 41.5 years (10.4 ttl pk-yrs)     Types: Cigars, Cigarettes     Start date: 1982     Last attempt to quit: 2023     Years since quittin.3    Smokeless tobacco: Never   Substance and Sexual Activity    Alcohol use: Not Currently     Alcohol/week: 2.0 standard drinks of alcohol     Types: 2 Cans of beer per week    Drug use: Not Currently     Types: Oxycodone    Sexual activity: Not Currently     Partners: Female     Birth control/protection: None     Social Drivers of Health     Financial Resource Strain: Low Risk  (10/10/2024)    Overall Financial Resource Strain (CARDIA)     Difficulty of Paying Living Expenses: Not hard at all   Food Insecurity: No Food Insecurity (10/10/2024)    Hunger Vital Sign     Worried About Running Out of Food in the Last Year: Never true     Ran Out of Food in the Last Year: Never true   Transportation Needs: No Transportation Needs (10/10/2024)    TRANSPORTATION NEEDS     Transportation : No   Physical Activity: Inactive (2023)    Exercise Vital Sign     Days of Exercise per Week: 0 days     Minutes of Exercise per Session: 0 min   Stress: No Stress Concern Present (10/10/2024)    [employer] - Occupational Stress Questionnaire     Feeling of Stress : Only a little   Housing Stability: Low Risk  (10/10/2024)    Housing Stability Vital Sign     Unable to Pay for Housing in the Last Year: No     Homeless in the Last Year: No       Scheduled Meds:   atorvastatin  20 mg Oral Nightly    carvediloL  50 mg Oral BID    doxycycline  100 mg Oral Q12H    electrolytes-dextrose  400 mL Oral BID AC    fenofibrate  48 mg Oral Daily    FLUoxetine  20 mg Oral Daily    gabapentin  400 mg Oral Q8H    hydroCHLOROthiazide  25 mg Oral Daily    hyoscyamine  0.125 mg Sublingual Q4H    LIDOcaine HCL 10 mg/ml (1%)  10 mL Other Once    megestroL  200 mg  "Oral BID    NIFEdipine  90 mg Oral Daily    oxybutynin  5 mg Oral TID    pantoprazole  40 mg Oral BID AC    polyethylene glycol  17 g Oral BID    senna-docusate 8.6-50 mg  2 tablet Oral BID    sodium chloride 0.9%  10 mL Intravenous Q6H     Continuous Infusions:        PRN Meds:  Current Facility-Administered Medications:     0.9%  NaCl infusion (for blood administration), , Intravenous, Q24H PRN    0.9%  NaCl infusion (for blood administration), , Intravenous, Q24H PRN    0.9%  NaCl infusion (for blood administration), , Intravenous, Q24H PRN    0.9%  NaCl infusion (for blood administration), , Intravenous, Q24H PRN    acetaminophen, 650 mg, Oral, Q4H PRN    albuterol-ipratropium, 3 mL, Nebulization, Q4H PRN    aluminum-magnesium hydroxide-simethicone, 30 mL, Oral, QID PRN    dextrose 10%, 12.5 g, Intravenous, PRN    dextrose 10%, 25 g, Intravenous, PRN    diphenhydrAMINE, 50 mg, Intravenous, Q6H PRN    etomidate, , Intravenous, Code/trauma/sedation Med    glucagon (human recombinant), 1 mg, Intramuscular, PRN    guaiFENesin 100 mg/5 ml, 200 mg, Oral, Q4H PRN    hydrALAZINE, 20 mg, Intravenous, Q4H PRN    hydrOXYzine HCL, 25 mg, Oral, BID PRN    methocarbamoL, 750 mg, Oral, TID PRN    morphine, 2 mg, Intravenous, BID PRN    ondansetron, 4 mg, Intravenous, Q4H PRN    oxyCODONE-acetaminophen, 1 tablet, Oral, Q4H PRN    prochlorperazine, 5 mg, Intravenous, Q6H PRN    rocuronium, , Intravenous, Code/trauma/sedation Med    sodium chloride 0.9%, 10 mL, Intravenous, PRN    Flushing PICC/Midline Protocol, , , Until Discontinued **AND** sodium chloride 0.9%, 10 mL, Intravenous, Q6H **AND** sodium chloride 0.9%, 10 mL, Intravenous, PRN    traZODone, 200 mg, Oral, Nightly PRN    Objective:  /77   Pulse 78   Temp 97.6 °F (36.4 °C) (Axillary)   Resp 18   Ht 5' 10" (1.778 m)   Wt 79.3 kg (174 lb 13.2 oz)   SpO2 97%   BMI 25.08 kg/m²     Physical Exam:   Physical Exam  Vitals reviewed.   HENT:      Head: " Normocephalic.   Cardiovascular:      Rate and Rhythm: Normal rate and regular rhythm.   Pulmonary:      Effort: Pulmonary effort is normal. No respiratory distress.      Breath sounds: Normal breath sounds. No wheezing.      Comments: Currently on 1L NC  Abdominal:      General: Bowel sounds are normal. There is no distension.      Palpations: Abdomen is soft.      Tenderness: There is no abdominal tenderness.   Genitourinary:     Comments: Suprapubic catheter  Musculoskeletal:      Cervical back: Normal range of motion.      Comments: Paraplegia   Skin:     Findings: No rash.      Comments: Wounds dressed. R chest tunneled central line   Neurological:      Mental Status: He is alert and oriented to person, place, and time.   Psychiatric:         Behavior: Behavior normal.     Imaging    Lab Review   No results found for this or any previous visit (from the past 24 hours).      Assessment/Plan:  Sepsis - resolved  Sacral wound infection - CR Acinetobacter / ESBL Ecoli / Enterococcus / Bacteroides / Peptoniphilus isolates  Pneumonitis / Pleural effusions  MDR Pseudomonas (+) sputum  ANTON  Pyelonephritis per CT   Constipation  Paraplegia  Neurogenic bladder with suprapubic catheter  DM Type II  MRSA L ankle osteomyelitis with retained hardware  Anemia    -On chronic suppressive antibiotic therapy with oral Doxycycline  -Remains afebrile without leukocytosis, follow  -Received multiple units of blood and blood products during his stay  -Urology consult for leakage around the suprapubic catheter, inputs noted  -Currently on 1L NC, wean as tolerated  -Repeat CXR on 10/26 with pulmonary edema, bilateral effusions, lower lobe atelectasis unchanged.   -CXR on 10/21 with B/L pleural effusions with associated atelectasis and/or infiltrate  -Sputum culture on 10/23 with moderate Pseudomonas species, follow  -Pt with significant anemia, CT abdomen/pelvis showed L renal rupture with large subcapsular hematoma  -Seen by Vascular,  inputs noted. S/P aortogram, left renal angiography with coil embolization of left renal artery on 10/17  -CT pelvis with contrast showed worsening inflammatory change of sacral ulcer with gas inferior to the R pubic ramus  -S/P debridement of sacral wound on 10/10  -Sacral wound cultures revealing CR Acinetobacter, ESBL Ecoli, Enterococcus, Bacteroides, Peptoniphilus isolates  -Continue wound care  -Surgery following. S/P colostomy placement on 11/6  -Blood cultures negative   -Discussed with nursing staff. Pt is a DNR with plans for discharge home with Hospice

## 2024-11-26 NOTE — CONSULTS
Inpatient Nutrition Assessment    Admit Date: 10/8/2024   Total duration of encounter: 49 days   Patient Age: 59 y.o.    Nutrition Recommendation/Prescription     -Continue 2000 Calorie Diabetic Diet as tolerated. Encourage po intake of meals.    -Continue Malachi BID for wound healing.  -Continue appetite stimulant once medically feasible.   -Monitor wt, labs, and intake.     Communication of Recommendations: reviewed with patient    Nutrition Assessment     Malnutrition Assessment/Nutrition-Focused Physical Exam    Malnutrition Context: acute illness or injury (10/17/24 1218)  Malnutrition Level: moderate (10/17/24 1218)  Energy Intake (Malnutrition):  (family denies) (10/17/24 1218)  Weight Loss (Malnutrition):  (does not meet criteria) (10/17/24 1218)  Subcutaneous Fat (Malnutrition): mild depletion (10/17/24 1218)     Upper Arm Region (Subcutaneous Fat Loss): mild depletion     Muscle Mass (Malnutrition): mild depletion (10/17/24 1218)     Clavicle Bone Region (Muscle Loss): mild depletion                    Fluid Accumulation (Malnutrition): mild (10/17/24 1218)        A minimum of two characteristics is recommended for diagnosis of either severe or non-severe malnutrition.    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 including ESBL E coli, Enterobacter, carbapenem resistant Pseudomonas, Enterococcus faecalis     Scheduled  Medications:  atorvastatin, 20 mg, Nightly  carvediloL, 50 mg, BID  doxycycline, 100 mg, Q12H  electrolytes-dextrose, 400 mL, BID AC  fenofibrate, 48 mg, Daily  FLUoxetine, 20 mg, Daily  gabapentin, 400 mg, Q8H  hydroCHLOROthiazide, 25 mg, Daily  hyoscyamine, 0.125 mg, Q4H  LIDOcaine HCL 10 mg/ml (1%), 10 mL, Once  megestroL, 200 mg, BID  NIFEdipine, 90 mg, Daily  oxybutynin, 5 mg, TID  pantoprazole, 40 mg, BID AC  polyethylene glycol, 17 g, BID  senna-docusate 8.6-50 mg, 2 tablet, BID  sodium chloride 0.9%, 10 mL, Q6H    Continuous Infusions:       PRN Medications:   0.9%  NaCl infusion (for blood administration), , Q24H PRN  0.9%  NaCl infusion (for blood administration), , Q24H PRN  0.9%  NaCl infusion (for blood administration), , Q24H PRN  0.9%  NaCl infusion (for blood administration), , Q24H PRN  acetaminophen, 650 mg, Q4H PRN  albuterol-ipratropium, 3 mL, Q4H PRN  aluminum-magnesium hydroxide-simethicone, 30 mL, QID PRN  dextrose 10%, 12.5 g, PRN  dextrose 10%, 25 g, PRN  diphenhydrAMINE, 50 mg, Q6H PRN  etomidate, , Code/trauma/sedation Med  glucagon (human recombinant), 1 mg, PRN  guaiFENesin 100 mg/5 ml, 200 mg, Q4H PRN  hydrALAZINE, 20 mg, Q4H PRN  hydrOXYzine HCL, 25 mg, BID PRN  methocarbamoL, 750 mg, TID PRN  morphine, 2 mg, BID PRN  ondansetron, 4 mg, Q4H PRN  oxyCODONE-acetaminophen, 1 tablet, Q4H PRN  prochlorperazine, 5 mg, Q6H PRN  rocuronium, , Code/trauma/sedation Med  sodium chloride 0.9%, 10 mL, PRN  sodium chloride 0.9%, 10 mL, PRN  traZODone, 200 mg, Nightly PRN    Calorie Containing IV Medications: no significant kcals from medications at this time    Recent Labs   Lab 11/19/24  1038 11/20/24  0502 11/21/24  0628   NA  --   --  134*   K  --   --  4.4   CALCIUM  --   --  8.8   CL  --   --  100   CO2  --   --  27   BUN  --   --  26.8*   CREATININE  --   --  1.21   EGFRNORACEVR  --   --  >60   GLUCOSE  --   --  105   PREALB 9.6* 10.1*  --    WBC  --   --  10.01   HGB  --   --  9.2*   HCT  --    --  28.2*     Nutrition Orders:  Diet Consistent Carbohydrate 2000 Calories (up to 75 gm per meal)  Diet Adult Regular  Dietary nutrition supplements BID; Malachi - Fruit Punch    Appetite/Oral Intake: poor/25-50% of meals  Factors Affecting Nutritional Intake: decreased appetite and nausea  Social Needs Impacting Access to Food: none identified  Food/Restoration/Cultural Preferences: none reported  Food Allergies: none reported  Last Bowel Movement: 11/25/24  Wound(s):     Wound 11/15/24 0800 Pressure Injury Left posterior Greater trochanter-Tissue loss description: Full thickness       Altered Skin Integrity 06/28/23 2230 Right lateral Ankle #6-Tissue loss description: Partial thickness       Wound 05/30/24 0000 Pressure Injury Right Buttocks-Tissue loss description: Full thickness       Altered Skin Integrity 01/09/24 0848 upper Sacral spine-Tissue loss description: Full thickness       Wound 08/22/24 0800 Other (comment) Left Heel-Tissue loss description: Partial thickness    Comments    10/10: Pt was in surgery at time of visit. Having wound debridement with wound vac placement on Stage 4 wound of rt gluteus. Will add Malachi to assist with wound healing. Recommend vitamin regimen for wounds.     10/17/24 Patient transferred to ICU, on ventilator currently, no propofol. Spoke with patient's wife at bedside who reports patient was eating well with a good appetite prior to ICU transfer, she reports bringing him food from home, good intake of Malachi. Tube feeding recommendation provided.    10/18/24 Consult received for tube feeding, will order.    10/22/24: Per RN, pt did not want to really eat earlier; family present in the room and encouraging intake.     10/24/24: Per pt's family member, pt ate ~ half of breakfast. Pt denies n/v; would like a strawberry flavored oral supplement.     10/29/24: Pt still with decreased appetite; denies n/v; taking his oral supplements.     11/1/24: Pt with fair intake, eating a lot of  "outside foods; now on appetite stimulant; taking his Malachi; would only like Boost VHC once daily.     24: Per RN, taking in some; still on appetite stimulant.     24: Pt had colostomy creation yesterday; diet just advanced to clears.     24: Pt on regular diet and tolerating but not taking much in, less than half of his meals. Pt is just not hungry; denies n/v; states that he is taking the Malachi but not drinking the Boost and would like to cancel the Boost.     11/15/24: Pt still with poor appetite/intake; denies n/v; eating some outside foods being brought in by family; taking the Malachi.     24: Pt still not eating much of meals; eating some outside foods; taking Malachi.    24: Pt eating fair with some foods, depends on what it is; eating mostly foods being brought from home. Pt was just started back on appetite stimulant; continues to take the Malachi.     24: Pt eating a little; has occasional nausea; pt being d/c'ed with hospice today.     Anthropometrics    Height: 5' 10" (177.8 cm), Height Method: Stated  Last Weight: 79.3 kg (174 lb 13.2 oz) (10/17/24 1212), Weight Method: Bed Scale  BMI (Calculated): 25.1  BMI Classification: normal (BMI 18.5-24.9)        Ideal Body Weight (IBW), Male: 166 lb     % Ideal Body Weight, Male (lb): 96.95 %                 Usual Body Weight (UBW), k.57 kg-77.11 kg  % Usual Body Weight: 109.5  % Weight Change From Usual Weight: 9.27 %  Usual Weight Provided By: family/caregiver    Wt Readings from Last 5 Encounters:   10/17/24 79.3 kg (174 lb 13.2 oz)   24 63.5 kg (139 lb 15.9 oz)   24 71.2 kg (156 lb 15.5 oz)   24 78.5 kg (173 lb 1 oz)   01/10/24 78.5 kg (173 lb 1 oz)     Weight Change(s) Since Admission:   (10/17) took bed weight 79.3 kg during rounds (estimated 4 kg subtracted for equipment), increase noted  Wt Readings from Last 1 Encounters:   10/17/24 1212 79.3 kg (174 lb 13.2 oz)   10/09/24 0430 73 kg (160 lb 15 oz) "   10/08/24 1719 63.5 kg (140 lb)   Admit Weight: 63.5 kg (140 lb) (10/08/24 1719), Weight Method: Stated  11/11/24-11/26/24: no new wt noted    Estimated Needs    Weight Used For Calorie Calculations: 79.3 kg (174 lb 13.2 oz)  Energy Calorie Requirements (kcal): 2967-1690 kcal (25-30 kcal/kg)  Energy Need Method: Kcal/kg  Weight Used For Protein Calculations: 79.3 kg (174 lb 13.2 oz)  Protein Requirements: 95 gm (1.2g/kg)  Fluid Requirements (mL): 1983 mL  CHO Requirement: 262-315 gm (45% EEN)  Last Updated: 10/22    Enteral Nutrition Patient not receiving enteral nutrition at this time.    Parenteral Nutrition Patient not receiving parenteral nutrition support at this time.    Evaluation of Received Nutrient Intake    Calories: not meeting estimated needs  Protein: not meeting estimated needs    Patient Education Not applicable.    Nutrition Diagnosis     PES: Inadequate energy intake related to inability to consume sufficient nutrients as evidenced by less than 80% needs met. (active)  PES: Moderate acute disease or injury related malnutrition related to acute illness as evidenced by mild fat depletion, mild muscle depletion, and edema. (active)    Nutrition Interventions     Intervention(s): modified composition of enteral nutrition, modified rate of enteral nutrition, and collaboration with other providers  Goal: Meet greater than 80% of nutritional needs by follow-up. (goal progressing)  Goal: Tolerate enteral feeding at goal rate by follow-up. (goal discontinued)    Nutrition Goals & Monitoring     Dietitian will monitor: food and beverage intake, energy intake, weight, and electrolyte/renal panel  Discharge planning:  Diabetic Diet with Malachi BID   Nutrition Risk/Follow-Up: high (follow-up in 1-4 days)   Please consult if re-assessment needed sooner.

## 2024-11-26 NOTE — CARE UPDATE
[id number] Pt's mattress was delivered this AM to pt's home. Lucia with [facility] took pt out of will call with [facility]. Ambulance will be here shortly to transport pt to his house. Informed pt and pt's family as well as pt's nurse

## 2024-11-26 NOTE — PLAN OF CARE
Problem: Adult Inpatient Plan of Care  Goal: Plan of Care Review  Outcome: Adequate for Care Transition  Goal: Patient-Specific Goal (Individualized)  Outcome: Adequate for Care Transition  Goal: Absence of Hospital-Acquired Illness or Injury  Outcome: Adequate for Care Transition  Goal: Optimal Comfort and Wellbeing  Outcome: Adequate for Care Transition  Goal: Readiness for Transition of Care  Outcome: Adequate for Care Transition     Problem: Diabetes Comorbidity  Goal: Blood Glucose Level Within Targeted Range  Outcome: Adequate for Care Transition     Problem: Infection  Goal: Absence of Infection Signs and Symptoms  Outcome: Adequate for Care Transition     Problem: Wound  Goal: Optimal Coping  Outcome: Adequate for Care Transition  Goal: Optimal Functional Ability  Outcome: Adequate for Care Transition  Goal: Absence of Infection Signs and Symptoms  Outcome: Adequate for Care Transition  Goal: Improved Oral Intake  Outcome: Adequate for Care Transition  Goal: Optimal Pain Control and Function  Outcome: Adequate for Care Transition  Goal: Skin Health and Integrity  Outcome: Adequate for Care Transition  Goal: Optimal Wound Healing  Outcome: Adequate for Care Transition     Problem: Skin Injury Risk Increased  Goal: Skin Health and Integrity  Outcome: Adequate for Care Transition     Problem: Pain Acute  Goal: Optimal Pain Control and Function  Outcome: Adequate for Care Transition     Problem: Fall Injury Risk  Goal: Absence of Fall and Fall-Related Injury  Outcome: Adequate for Care Transition     Problem: Coping Ineffective  Goal: Effective Coping  Outcome: Adequate for Care Transition

## 2025-05-06 NOTE — PROGRESS NOTES
Infectious Diseases Progress Note  58-year-old male with past medical history of T-spine cord injury with paraplegia, diabetes, HTN, hepatitis-C, chronic right ankle wound/osteomyelitis, was on suppressive doxycycline, known to my service, seen by us initially at [facility] and has followed with us at the office for chronic osteomyelitis, is admitted to [facility] on 06/28/2023 presenting through the ED with complaints of pain and swelling to his right knee, apparently struck his knee.  He did also report prior remote right knee surgery.  He was evaluated and noted to have no fevers initially but a temperature of 100.2° today 6/29, no leukocytosis but with thrombocytosis of up to 531 today.  .2 and ESR >130.  He is anemic with low albumin.  Blood cultures have been negative.  CT scan of the right knee noted a 5 cm area of subcutaneous fluid collection in the prepatellar region.  There was aspiration in the ER with findings of purulent fluid and cultures showing group B Streptococcus.  He was seen by the orthopedic surgery team with findings of right prepatellar bursa abscess and is status post incision and drainage of right knee septic arthritis and right prepatellar bursa abscess today 6/29 with cultures pending.    He is on Merrem.    Subjective:  Remains in the ICU, orally intubated on ventilator.  No new complaints, fever spike noted, doing about the same otherwise, in no acute distress.      Past Medical History:   Diagnosis Date    Arthritis     Chronic ulcer of ankle 05/26/2022    Frequent UTI 07/02/2019    Generalized anxiety disorder 05/26/2022    Neurogenic bladder 05/26/2022    Osteomyelitis 05/26/2022    Presence of suprapubic catheter 05/26/2022    Pure hypercholesterolemia 05/26/2022    Retention of urine, unspecified 08/09/2019    Spinal cord injury at T1-T6 level 04/20/2018     Past Surgical History:   Procedure Laterality Date    INCISION AND DRAINAGE,  LOWER EXTREMITY Right 6/29/2023    Procedure: INCISION AND DRAINAGE, LOWER EXTREMITY;  Surgeon: Prabhu Shen DO;  Location: [facility] OR;  Service: Orthopedics;  Laterality: Right;  supine bone foam wash stuff cultures    INSERTION OF INTRAMEDULLARY MARISA Right 8/10/2022    Procedure: INSERTION, INTRAMEDULLARY MARISA RIGHT TIBIA;  Surgeon: Jorge Orellana MD;  Location: [facility] OR;  Service: Orthopedics;  Laterality: Right;     Social History     Socioeconomic History    Marital status:    Tobacco Use    Smoking status: Every Day     Types: Cigars, Cigarettes    Smokeless tobacco: Never   Substance and Sexual Activity    Alcohol use: Yes     Alcohol/week: 2.0 standard drinks     Types: 2 Cans of beer per week    Drug use: Not Currently    Sexual activity: Never       Review of Systems   Unable to perform ROS: Intubated       Review of patient's allergies indicates:   Allergen Reactions    Baclofen Itching and Anxiety         Scheduled Meds:   amLODIPine  5 mg Oral Daily    atorvastatin  20 mg Oral Nightly    carvediloL  6.25 mg Oral BID    doxycycline  100 mg Oral Q12H    enoxaparin  30 mg Subcutaneous Daily    famotidine (PF)  20 mg Intravenous Daily    fenofibrate  48 mg Oral Daily    furosemide (LASIX) injection  40 mg Intravenous BID    guaiFENesin 100 mg/5 ml  400 mg Per NG tube Q4H    meropenem (MERREM) IVPB  1 g Intravenous Q12H    miconazole NITRATE 2 %   Topical (Top) BID    oxybutynin  5 mg Oral BID    sodium bicarbonate  650 mg Oral BID    sodium chloride 3%  4 mL Nebulization Q4H    sodium citrate-citric acid 500-334 mg/5 ml  30 mL Oral Once     Continuous Infusions:   dexmedeTOMIDine (Precedex) infusion (titrating) 1.4 mcg/kg/hr (07/09/23 2005)    NORepinephrine bitartrate-D5W 0.02 mcg/kg/min (07/04/23 1315)    propofoL 30 mcg/kg/min (07/09/23 1707)     PRN Meds:sodium chloride, acetaminophen, hydrALAZINE, labetalol, sodium chloride 0.9%, sodium chloride 0.9%    Objective:  /77   Pulse 92   Temp  "99.4 °F (37.4 °C) (Oral)   Resp (!) 26   Ht 5' 9.69" (1.77 m)   Wt 86.2 kg (190 lb)   SpO2 (!) 94%   BMI 27.51 kg/m²     Physical Exam:   Physical Exam  Vitals reviewed.   Constitutional:       General: He is not in acute distress.     Appearance: He is ill-appearing.   HENT:      Head: Normocephalic and atraumatic.      Mouth/Throat:      Comments: ET tube in place  Cardiovascular:      Rate and Rhythm: Normal rate and regular rhythm.      Heart sounds: Normal heart sounds.   Pulmonary:      Effort: No respiratory distress.      Comments: Coarse breath sounds on ventilator  Abdominal:      General: Bowel sounds are normal. There is no distension.      Palpations: Abdomen is soft.      Tenderness: There is no abdominal tenderness.   Genitourinary:     Comments: Suprapubic catheter  Musculoskeletal:      Cervical back: Neck supple.      Comments: Some contracture of lower extremities   Skin:     Findings: No rash.      Comments: Right knee with wound VAC. Right ankle and left heel wounds dressed   Neurological:      Comments: Unable to fully evaluate, sedated on ventilator   Psychiatric:      Comments: Not communicative        Imaging  Imaging Results              CT Knee W W/O Contrast Right (Final result)  Result time 06/28/23 18:37:42      Final result by Gustavo Cassidy MD (06/28/23 18:37:42)                   Impression:      Mildly limited assessment.  5 cm area of subcutaneous fluid in the prepatellar region may have thin peripheral enhancement.  Fluid collection is possible.    Subcutaneous edema in the calf.      Electronically signed by: Gustavo Cassidy  Date:    06/28/2023  Time:    18:37               Narrative:    EXAMINATION:  CT KNEE W W/O CONTRAST RIGHT    CLINICAL HISTORY:  possible infection;    TECHNIQUE:  CT imaging of the right knee before and after IV contrast.  Axial, coronal and sagittal reformatted images reviewed.   Dose length product is 585 mGycm. Automatic exposure control, adjustment " Female of mA/kV or iterative reconstruction technique used to limit radiation dose.    COMPARISON:  Radiograph 06/28/2023    FINDINGS:  Assessment mildly limited by motion.  Joint alignments are maintained with degenerative changes at the knee.  Fixation hardware in the lower femur and tibia with remote proximal fibular fracture.  Areas of heterotopic ossification along the lower portion of the femur.  Small knee effusion.  Areas of subcutaneous edema anteriorly below the knee.  More focal area of fluid in the subcutaneous tissues of the prepatellar region measures up to 5 cm in transverse diameter and 5 cm in length.  There is image noise from the hardware, but this fluid may have thin areas of peripheral enhancement.  No tracking subcutaneous gas.                                       X-Ray Tibia Fibula 2 View Right (Final result)  Result time 06/28/23 18:14:06      Final result by Tracy Zamudio MD (06/28/23 18:14:06)                   Impression:      Posttraumatic and postsurgical changes at the femur and lower leg.  There is chronic deformity at the distal femur with heterogeneous sclerosis and lucency superimposed on background bony demineralization.  No old imaging of this region available for comparison.  This makes it difficult to evaluate for acute superimposed lytic change.    Postsurgical and posttraumatic change at the tibia and fibula with bony demineralization.  No appreciable acute osseous abnormality.      Electronically signed by: Tracy Zamudio  Date:    06/28/2023  Time:    18:14               Narrative:    EXAMINATION:  XR FEMUR 2 VIEW RIGHT; XR TIBIA FIBULA 2 VIEW RIGHT    CLINICAL HISTORY:  possible infection;; infection;    TECHNIQUE:  AP and lateral views of the right femur were performed.    AP and lateral views of the right tibia and fibula were obtained.    COMPARISON:  Knee radiographs 06/28/2023, tibia and fibular radiographs 08/10/2022    FINDINGS:  There are postsurgical changes of  ORIF at the distal femur with lateral plate and screws.  There are postsurgical changes of ORIF at the tibia with antegrade intramedullary raven and proximal and distal interlocking screws.  Hardware appears intact.  No acute fracture seen.  There are old, healed fracture deformities.    There is chronic remodeling at the distal femur with heterogeneous appearance of the marrow and cortex distally.  There are areas of lucency as well as sclerosis.  There is no imaging through the distal femur available for comparison to evaluate for acute lytic changes superimposed on chronic background posttraumatic and postsurgical change.  Older prior radiographs of the tibia and fibula do not adequately imaged the area.  The bones are demineralized.    There is soft tissue swelling at the knee.  There is soft tissue swelling at the ankle.                                       X-Ray Femur 2 View Right (Final result)  Result time 06/28/23 18:14:06      Final result by Tracy Zamudio MD (06/28/23 18:14:06)                   Impression:      Posttraumatic and postsurgical changes at the femur and lower leg.  There is chronic deformity at the distal femur with heterogeneous sclerosis and lucency superimposed on background bony demineralization.  No old imaging of this region available for comparison.  This makes it difficult to evaluate for acute superimposed lytic change.    Postsurgical and posttraumatic change at the tibia and fibula with bony demineralization.  No appreciable acute osseous abnormality.      Electronically signed by: Tracy Zamudio  Date:    06/28/2023  Time:    18:14               Narrative:    EXAMINATION:  XR FEMUR 2 VIEW RIGHT; XR TIBIA FIBULA 2 VIEW RIGHT    CLINICAL HISTORY:  possible infection;; infection;    TECHNIQUE:  AP and lateral views of the right femur were performed.    AP and lateral views of the right tibia and fibula were obtained.    COMPARISON:  Knee radiographs 06/28/2023, tibia and  fibular radiographs 08/10/2022    FINDINGS:  There are postsurgical changes of ORIF at the distal femur with lateral plate and screws.  There are postsurgical changes of ORIF at the tibia with antegrade intramedullary raven and proximal and distal interlocking screws.  Hardware appears intact.  No acute fracture seen.  There are old, healed fracture deformities.    There is chronic remodeling at the distal femur with heterogeneous appearance of the marrow and cortex distally.  There are areas of lucency as well as sclerosis.  There is no imaging through the distal femur available for comparison to evaluate for acute lytic changes superimposed on chronic background posttraumatic and postsurgical change.  Older prior radiographs of the tibia and fibula do not adequately imaged the area.  The bones are demineralized.    There is soft tissue swelling at the knee.  There is soft tissue swelling at the ankle.                                       X-Ray Knee 3 View Right (Final result)  Result time 06/28/23 14:18:03      Final result by Lanette Waller MD (06/28/23 14:18:03)                   Impression:      1. No acute bony abnormality.  2. Soft tissue swelling around the knee.      Electronically signed by: Lanette Waller  Date:    06/28/2023  Time:    14:18               Narrative:    EXAMINATION:  XR KNEE 3 VIEW RIGHT    CLINICAL HISTORY:  Effusion, right knee    COMPARISON:  None.    FINDINGS:  There has been prior internal fixation of the femur and tibia.  There are chronic changes of the bones with heterotopic ossification around the knee.  There is no acute fracture identified.  There is soft tissue swelling around the knee.                                       Lab Review   Recent Results (from the past 24 hour(s))   Phosphorus    Collection Time: 07/09/23 12:37 AM   Result Value Ref Range    Phosphorus Level 5.6 (H) 2.3 - 4.7 mg/dL   Magnesium    Collection Time: 07/09/23 12:37 AM   Result Value Ref Range     Magnesium Level 2.00 1.60 - 2.60 mg/dL   Comprehensive Metabolic Panel    Collection Time: 07/09/23 12:37 AM   Result Value Ref Range    Sodium Level 130 (L) 136 - 145 mmol/L    Potassium Level 5.1 3.5 - 5.1 mmol/L    Chloride 93 (L) 98 - 107 mmol/L    Carbon Dioxide 22 22 - 29 mmol/L    Glucose Level 120 (H) 74 - 100 mg/dL    Blood Urea Nitrogen 57.1 (H) 8.4 - 25.7 mg/dL    Creatinine 4.04 (H) 0.73 - 1.18 mg/dL    Calcium Level Total 8.6 8.4 - 10.2 mg/dL    Protein Total 7.2 6.4 - 8.3 gm/dL    Albumin Level 2.0 (L) 3.5 - 5.0 g/dL    Globulin 5.2 (H) 2.4 - 3.5 gm/dL    Albumin/Globulin Ratio 0.4 (L) 1.1 - 2.0 ratio    Bilirubin Total 0.4 <=1.5 mg/dL    Alkaline Phosphatase 109 40 - 150 unit/L    Alanine Aminotransferase 9 0 - 55 unit/L    Aspartate Aminotransferase 27 5 - 34 unit/L    eGFR 16 mls/min/1.73/m2   CBC with Differential    Collection Time: 07/09/23 12:37 AM   Result Value Ref Range    WBC 12.19 (H) 4.50 - 11.50 x10(3)/mcL    RBC 4.24 (L) 4.70 - 6.10 x10(6)/mcL    Hgb 10.7 (L) 14.0 - 18.0 g/dL    Hct 31.9 (L) 42.0 - 52.0 %    MCV 75.2 (L) 80.0 - 94.0 fL    MCH 25.2 (L) 27.0 - 31.0 pg    MCHC 33.5 33.0 - 36.0 g/dL    RDW 18.6 (H) 11.5 - 17.0 %    Platelet 589 (H) 130 - 400 x10(3)/mcL    MPV 10.2 7.4 - 10.4 fL    Neut % 82.6 %    Lymph % 9.4 %    Mono % 6.4 %    Eos % 0.1 %    Basophil % 0.2 %    Lymph # 1.14 0.6 - 4.6 x10(3)/mcL    Neut # 10.08 (H) 2.1 - 9.2 x10(3)/mcL    Mono # 0.78 0.1 - 1.3 x10(3)/mcL    Eos # 0.01 0 - 0.9 x10(3)/mcL    Baso # 0.02 <=0.2 x10(3)/mcL    IG# 0.16 (H) 0 - 0.04 x10(3)/mcL    IG% 1.3 %    NRBC% 0.0 %   Lactic Acid, Plasma    Collection Time: 07/09/23  1:31 AM   Result Value Ref Range    Lactic Acid Level 1.3 0.5 - 2.2 mmol/L   Blood Gas (ABG)    Collection Time: 07/09/23  5:34 AM   Result Value Ref Range    Sample Type Arterial Blood     Sample site Right Radial Artery     Drawn by tdl rrt     pH, Blood gas 7.400 7.350 - 7.450    pCO2, Blood gas 44.0 35.0 - 45.0 mmHg    pO2,  Blood gas 73.0 (L) 80.0 - 100.0 mmHg    Sodium, Blood Gas 128 (L) 137 - 145 mmol/L    Potassium, Blood Gas 4.5 3.5 - 5.0 mmol/L    Calcium Level Ionized 1.08 (L) 1.12 - 1.23 mmol/L    TOC2, Blood gas 28.7 mmol/L    Base Excess, Blood gas 2.10 (H) -2.00 - 2.00 mmol/L    sO2, Blood gas 94.5 %    HCO3, Blood gas 27.3 (H) 22.0 - 26.0 mmol/L    THb, Blood gas 10.2 (L) 12 - 16 g/dL    O2 Hb, Blood Gas 92.9 (L) 94.0 - 97.0 %    CO Hgb 1.2 0.5 - 1.5 %    Met Hgb 1.0 0.4 - 1.5 %    Allens Test Yes     FIO2, Blood gas 40 %    Mech Vt 450 ml    Mech RR 16 b/min    PEEP 10.0 cmH2O             Assessment/Plan:  1. SIRS with fevers  2. Group B Streptococcus Right knee prepatellar abscess  3. Group B Streptococcus Right knee septic arthritis  4.  Anemia/reactive thrombocytosis  5.  Paraplegia secondary to T-spine cord injury  6. History of hepatitis-C   7. Chronic right ankle wound/osteomyelitis  8.  Diabetes    9.  Acute kidney injury  10. Acute hypoxic respiratory failure  11. Pulmonary edema with pleural effusions/ Pneumonitis      -Continue Merrem #6 and maintain chronic suppressive doxycycline    -Fever spike noted but seem to have trended down and leukocytosis trending down, follow  -7/4 blood cultures remain negative and sputum culture with moderate yeast.  7/8 repeat blood cultures negative so far, follow  -7/8 CXR is unchanged with persistent patchy airspace opacity with bibasilar effusions  -7/4 CT scan of the abdomen and pelvis and 7/3 chest x-ray results noted, likely dealing with pulmonary edema with pleural effusions and possibly superimposed infectious pneumonitis. We will get procalcitonin level for further evaluation  -6/28 right knee abscess culture with Group B Streptococcus  -Seen by Orthopedic surgery team calm s/p I&D of R prepatellar bursa abscess and septic R knee with cultures yielding Group G Streptococcus  -6/28 blood cultures negative  -Multiple comorbidities, paraplegic with chronic pressure wounds,  right ankle osteomyelitis with exposed bone on chronic suppressive doxycycline  -Plan for a long-term antibiotic coverage, about a 6 week course for GBS septic arthritis, following inflammatory markers as well as maintaining on chronic suppressive antibiotics with doxycycline  -We will continue surgical site care per Orthopedic surgery team  -We will continue wound care  -He is to continue management of his hepatitis-C as outpatient  -Renal impairment noted, HD per Nephrology, started 7/4  -7/3 Renal ultrasound results noted  -Continue ventilator management and ICU support per intensivist  -Discussed with nursing staff.
